# Patient Record
Sex: FEMALE | Race: WHITE | Employment: OTHER | URBAN - METROPOLITAN AREA
[De-identification: names, ages, dates, MRNs, and addresses within clinical notes are randomized per-mention and may not be internally consistent; named-entity substitution may affect disease eponyms.]

---

## 2017-01-16 ENCOUNTER — OFFICE VISIT (OUTPATIENT)
Dept: CARDIOLOGY CLINIC | Age: 72
End: 2017-01-16

## 2017-01-16 VITALS
BODY MASS INDEX: 39.7 KG/M2 | SYSTOLIC BLOOD PRESSURE: 126 MMHG | OXYGEN SATURATION: 98 % | HEIGHT: 66 IN | RESPIRATION RATE: 22 BRPM | WEIGHT: 247 LBS | HEART RATE: 90 BPM | DIASTOLIC BLOOD PRESSURE: 76 MMHG

## 2017-01-16 DIAGNOSIS — I10 ESSENTIAL HYPERTENSION: ICD-10-CM

## 2017-01-16 DIAGNOSIS — I25.10 CORONARY ARTERY DISEASE INVOLVING NATIVE CORONARY ARTERY OF NATIVE HEART WITHOUT ANGINA PECTORIS: Primary | ICD-10-CM

## 2017-01-16 NOTE — PROGRESS NOTES
Bib Delong MD    [id number] [place], [address] [facility]    Office [phone],CDN [phone]  Pager [phone]    Esa Felton is a 73 y.o. female is here for f/u visit  CAD    Primary care physician:  Sheldon Santillan MD    Patient Active Problem List   Diagnosis Code    Type II diabetes mellitus ([facility] [address] E11.9    NSTEMI (non-ST elevated myocardial infarction) ([facility] [address] I21.4    Coronary artery disease involving native coronary artery without angina pectoris I25.10    Essential hypertension I10       Chief complaint:  Chief Complaint   Patient presents with    Coronary Artery Disease       Assessment:  CAD s/p PCI to RCA with BMS ( NSTEMI 10/2015)  HTN - low  HLD - intolerant to multiple statins sec to myalgia  DM - \" BS up and down\"  History of right DVT-October 2016  Chronic anticoagulation        Plan:   Continue cardiac meds  Patient states that she is being evaluated for gastric sleeve surgery. Will need stress test prior to surgery. Not on statin ( allergic/intolerant) -per patient her cholesterol is doing well. Is scheduled to get it checked in 2 weeks. Will need to consider PCKS9 inhibitors if cholesterol is elevated. Aggressive cardiovascular risk factor modification. Follow-up in 6 months. Patient understands the plan. All questions were answered to the patient's satisfaction. Lab Results   Component Value Date/Time    Cholesterol, total 174 10/10/2015 05:00 AM    HDL Cholesterol 52 10/10/2015 05:00 AM    LDL, calculated 68.8 10/10/2015 05:00 AM    VLDL, calculated 53.2 10/10/2015 05:00 AM    Triglyceride 266 10/10/2015 05:00 AM    CHOL/HDL Ratio 3.3 10/10/2015 05:00 AM         Medication Side Effects and Warnings were discussed with patient: yes  Patient Labs were reviewed and or requested:  yes  Patient Past Records were reviewed and or requested: yes    I appreciate the opportunity to be involved in Ms. Sellers. See note below for details. Please do not hesitate to contact us with questions or concerns. Shawnee Abad MD    Cardiac Testing/ Procedures: A. Cardiac Cath/PCI: 10/9/15 L Main: Medl;Nml    LAD: Prox- Med; 50%; Distal - small; D1 - Small to med - prox 60%    LCflex: Large; Tortuous; MLI; GUILLERMO - med - MLI    RCA: Med; Prox 80%; Distal 95% just before bifurcation into small PDA and PLB    LVEDP: 22    LVEF: 55%/ Mild basal inf hypokinesis    No significant gradient across aortic valve. PCI: JR4 guide/BMW  Pre dil with 2 by 12 balloon  BMS 2.25 by 22 deployed at high [place] distally  BMS 2.5 by 18 deployed at high [place] proximal lesion  Post dil with 2.5 by 15      B. ECHO/MEE: 10/11/15 - Left ventricle: Systolic function was vigorous. Ejection fraction was  estimated in the range of 65 % to 70 %. There were no regional wall motion  abnormalities. C.StressNuclear/Stress ECHO/Stress test:    D.Vascular:    E. EP:    F. Miscellaneous:    Subjective:  Elaina Saenz is a 73 y.o. female who returns for follow up  Visit. No CP/dyspnea. Patient states that she developed a DVT and ulcers on her feet (October 2016)- for which is being treated in the wound clinic. Wearing compression stockings. No chest pain, dyspnea. However patient is not very active and uses a cane to walk. ROS:cc  (bold if positive, if negative)     edema         Medications before admission:    Current Outpatient Prescriptions   Medication Sig Dispense    rivaroxaban (XARELTO) 20 mg tab tablet Take  by mouth daily.  lisinopril (PRINIVIL, ZESTRIL) 5 mg tablet TAKE 1 TABLET BY MOUTH DAILY 30 Tab    carvedilol (COREG) 12.5 mg tablet TAKE 1 TABLET BY MOUTH TWICE DAILY WITH MEALS 60 Tab    biotin 10,000 mcg cap Take  by mouth daily.  bumetanide (BUMEX) 1 mg tablet Take  by mouth as needed.  saxagliptin (ONGLYZA) 5 mg tab tablet Take 5 mg by mouth daily.  omeprazole (PRILOSEC) 20 mg capsule Take 20 mg by mouth daily.      DULoxetine (CYMBALTA) 60 mg capsule Take 60 mg by mouth daily.  cholecalciferol (VITAMIN D3) 1,000 unit tablet Take 1,000 Units by mouth daily.  azelastine-fluticasone (DYMISTA) 137-50 mcg/spray spry 137 mcg by Nasal route two (2) times a day.  mometasone-formoterol (DULERA) 200-5 mcg/actuation HFA inhaler Take 2 Puffs by inhalation two (2) times a day.  glipiZIDE SR (GLUCOTROL) 5 mg CR tablet Take 5 mg by mouth two (2) times a day.  predniSONE (DELTASONE) 10 mg tablet Take 10 mg by mouth daily (with dinner).  B.infantis-B.ani-B.long-B.bifi (PROBIOTIC 4X) 10-15 mg TbEC Take 1 Tab by mouth daily. No current facility-administered medications for this visit. Physical Exam:  Visit Vitals    /76 (BP 1 Location: Left arm, BP Patient Position: Sitting)    Pulse 90    Resp 22    Ht 5' 6\" (1.676 m)    Wt 247 lb (112 kg)    SpO2 98%    BMI 39.87 kg/m2          Gen: Well-developed, well-nourished, in no acute distress  Neck: Supple,No JVD, No Carotid Bruit,   Resp: No accessory muscle use, no rales/rhonchi  Card: Regular Rate,Rythm,Normal S1, S2, No murmurs, rubs or gallop. No thrills.    Abd:  Soft, non-tender, non-distended,BS+,   MSK: No cyanosis  Skin: No rashes    Neuro: moving all four extremities , follows commands appropriately  Psych:  Good insight, oriented to person, place , alert, Nml Affect  LE: Wearing compression stockings    EKG:       LABS:        Lab Results   Component Value Date/Time    WBC 12.2 10/10/2015 05:00 AM    HGB 11.5 10/10/2015 05:00 AM    HCT 37.6 10/10/2015 05:00 AM    PLATELET 197 62/12/7283 05:00 AM    MCV 86.0 10/10/2015 05:00 AM     Lab Results   Component Value Date/Time    Sodium 137 10/10/2015 05:00 AM    Potassium 3.6 10/10/2015 05:00 AM    Chloride 104 10/10/2015 05:00 AM    CO2 27 10/10/2015 05:00 AM    Anion gap 6 10/10/2015 05:00 AM    Glucose 120 10/10/2015 05:00 AM    BUN 13 10/10/2015 05:00 AM    Creatinine 1.09 10/10/2015 05:00 AM BUN/Creatinine ratio 12 10/10/2015 05:00 AM    GFR est AA >60 10/10/2015 05:00 AM    GFR est non-AA 50 10/10/2015 05:00 AM    Calcium 8.2 10/10/2015 05:00 AM       No results found for: APTT  No results found for: INR, PTMR, PTP, PT1, PT2  No components found for: Valorie Newton MD

## 2017-02-10 DIAGNOSIS — I10 ESSENTIAL HYPERTENSION WITH GOAL BLOOD PRESSURE LESS THAN 130/85: ICD-10-CM

## 2017-02-10 RX ORDER — LISINOPRIL 5 MG/1
TABLET ORAL
Qty: 90 TAB | Refills: 3 | Status: SHIPPED | OUTPATIENT
Start: 2017-02-10 | End: 2018-03-16 | Stop reason: SDUPTHER

## 2017-04-16 ENCOUNTER — APPOINTMENT (OUTPATIENT)
Dept: CT IMAGING | Age: 72
End: 2017-04-16
Attending: EMERGENCY MEDICINE
Payer: MEDICARE

## 2017-04-16 ENCOUNTER — HOSPITAL ENCOUNTER (EMERGENCY)
Age: 72
Discharge: HOME | End: 2017-04-16
Attending: EMERGENCY MEDICINE
Payer: MEDICARE

## 2017-04-16 ENCOUNTER — HOSPITAL ENCOUNTER (EMERGENCY)
Age: 72
Discharge: HOME | End: 2017-04-16
Attending: EMERGENCY MEDICINE | Admitting: EMERGENCY MEDICINE
Payer: MEDICARE

## 2017-04-16 VITALS
HEIGHT: 67 IN | HEART RATE: 94 BPM | SYSTOLIC BLOOD PRESSURE: 159 MMHG | WEIGHT: 155 LBS | BODY MASS INDEX: 24.33 KG/M2 | TEMPERATURE: 97.5 F | DIASTOLIC BLOOD PRESSURE: 83 MMHG | OXYGEN SATURATION: 94 % | RESPIRATION RATE: 15 BRPM

## 2017-04-16 VITALS
RESPIRATION RATE: 14 BRPM | HEART RATE: 99 BPM | SYSTOLIC BLOOD PRESSURE: 180 MMHG | OXYGEN SATURATION: 95 % | WEIGHT: 155 LBS | HEIGHT: 67 IN | DIASTOLIC BLOOD PRESSURE: 91 MMHG | BODY MASS INDEX: 24.33 KG/M2 | TEMPERATURE: 98.5 F

## 2017-04-16 DIAGNOSIS — K52.9 GASTROENTERITIS, ACUTE: Primary | ICD-10-CM

## 2017-04-16 LAB
ALBUMIN SERPL BCP-MCNC: 3.2 G/DL (ref 3.5–5)
ALBUMIN/GLOB SERPL: 0.8 {RATIO} (ref 1.1–2.2)
ALP SERPL-CCNC: 122 U/L (ref 45–117)
ALT SERPL-CCNC: 26 U/L (ref 12–78)
ANION GAP BLD CALC-SCNC: 13 MMOL/L (ref 5–15)
APPEARANCE UR: CLEAR
AST SERPL W P-5'-P-CCNC: 14 U/L (ref 15–37)
BACTERIA URNS QL MICRO: NEGATIVE /HPF
BASOPHILS # BLD AUTO: 0 K/UL (ref 0–0.1)
BASOPHILS # BLD: 0 % (ref 0–1)
BILIRUB SERPL-MCNC: 0.7 MG/DL (ref 0.2–1)
BILIRUB UR QL: NEGATIVE
BUN SERPL-MCNC: 13 MG/DL (ref 6–20)
BUN/CREAT SERPL: 12 (ref 12–20)
CALCIUM SERPL-MCNC: 8.7 MG/DL (ref 8.5–10.1)
CHLORIDE SERPL-SCNC: 98 MMOL/L (ref 97–108)
CO2 SERPL-SCNC: 24 MMOL/L (ref 21–32)
COLOR UR: ABNORMAL
CREAT SERPL-MCNC: 1.05 MG/DL (ref 0.55–1.02)
DIFFERENTIAL METHOD BLD: ABNORMAL
EOSINOPHIL # BLD: 0 K/UL (ref 0–0.4)
EOSINOPHIL NFR BLD: 0 % (ref 0–7)
EPITH CASTS URNS QL MICRO: ABNORMAL /LPF
ERYTHROCYTE [DISTWIDTH] IN BLOOD BY AUTOMATED COUNT: 16.4 % (ref 11.5–14.5)
GLOBULIN SER CALC-MCNC: 4.1 G/DL (ref 2–4)
GLUCOSE SERPL-MCNC: 188 MG/DL (ref 65–100)
GLUCOSE UR STRIP.AUTO-MCNC: NEGATIVE MG/DL
HCT VFR BLD AUTO: 40.3 % (ref 35–47)
HGB BLD-MCNC: 13.1 G/DL (ref 11.5–16)
HGB UR QL STRIP: NEGATIVE
HYALINE CASTS URNS QL MICRO: ABNORMAL /LPF (ref 0–5)
KETONES UR QL STRIP.AUTO: 15 MG/DL
LEUKOCYTE ESTERASE UR QL STRIP.AUTO: NEGATIVE
LIPASE SERPL-CCNC: 189 U/L (ref 73–393)
LYMPHOCYTES # BLD AUTO: 5 % (ref 12–49)
LYMPHOCYTES # BLD: 0.9 K/UL (ref 0.8–3.5)
MCH RBC QN AUTO: 25.8 PG (ref 26–34)
MCHC RBC AUTO-ENTMCNC: 32.5 G/DL (ref 30–36.5)
MCV RBC AUTO: 79.3 FL (ref 80–99)
MONOCYTES # BLD: 0.9 K/UL (ref 0–1)
MONOCYTES NFR BLD AUTO: 5 % (ref 5–13)
NEUTS SEG # BLD: 16.9 K/UL (ref 1.8–8)
NEUTS SEG NFR BLD AUTO: 90 % (ref 32–75)
NITRITE UR QL STRIP.AUTO: NEGATIVE
PH UR STRIP: 5.5 [PH] (ref 5–8)
PLATELET # BLD AUTO: 322 K/UL (ref 150–400)
POTASSIUM SERPL-SCNC: 4.2 MMOL/L (ref 3.5–5.1)
PROT SERPL-MCNC: 7.3 G/DL (ref 6.4–8.2)
PROT UR STRIP-MCNC: ABNORMAL MG/DL
RBC # BLD AUTO: 5.08 M/UL (ref 3.8–5.2)
RBC #/AREA URNS HPF: ABNORMAL /HPF (ref 0–5)
RBC MORPH BLD: ABNORMAL
RBC MORPH BLD: ABNORMAL
SODIUM SERPL-SCNC: 135 MMOL/L (ref 136–145)
SP GR UR REFRACTOMETRY: 1.02 (ref 1–1.03)
UROBILINOGEN UR QL STRIP.AUTO: 0.2 EU/DL (ref 0.2–1)
WBC # BLD AUTO: 18.7 K/UL (ref 3.6–11)
WBC URNS QL MICRO: ABNORMAL /HPF (ref 0–4)

## 2017-04-16 PROCEDURE — 99285 EMERGENCY DEPT VISIT HI MDM: CPT

## 2017-04-16 PROCEDURE — 74011636320 HC RX REV CODE- 636/320: Performed by: EMERGENCY MEDICINE

## 2017-04-16 PROCEDURE — 51701 INSERT BLADDER CATHETER: CPT

## 2017-04-16 PROCEDURE — 96361 HYDRATE IV INFUSION ADD-ON: CPT

## 2017-04-16 PROCEDURE — 96374 THER/PROPH/DIAG INJ IV PUSH: CPT

## 2017-04-16 PROCEDURE — 80053 COMPREHEN METABOLIC PANEL: CPT | Performed by: EMERGENCY MEDICINE

## 2017-04-16 PROCEDURE — 85025 COMPLETE CBC W/AUTO DIFF WBC: CPT | Performed by: EMERGENCY MEDICINE

## 2017-04-16 PROCEDURE — 77030011943

## 2017-04-16 PROCEDURE — 96375 TX/PRO/DX INJ NEW DRUG ADDON: CPT

## 2017-04-16 PROCEDURE — 83690 ASSAY OF LIPASE: CPT | Performed by: EMERGENCY MEDICINE

## 2017-04-16 PROCEDURE — 74177 CT ABD & PELVIS W/CONTRAST: CPT

## 2017-04-16 PROCEDURE — 36415 COLL VENOUS BLD VENIPUNCTURE: CPT | Performed by: EMERGENCY MEDICINE

## 2017-04-16 PROCEDURE — 74011250636 HC RX REV CODE- 250/636: Performed by: EMERGENCY MEDICINE

## 2017-04-16 PROCEDURE — 81001 URINALYSIS AUTO W/SCOPE: CPT | Performed by: EMERGENCY MEDICINE

## 2017-04-16 RX ORDER — PROMETHAZINE HYDROCHLORIDE 25 MG/1
25 TABLET ORAL
Qty: 12 TAB | Refills: 0 | Status: SHIPPED | OUTPATIENT
Start: 2017-04-16 | End: 2018-03-30 | Stop reason: ALTCHOICE

## 2017-04-16 RX ORDER — FAMOTIDINE 10 MG/ML
20 INJECTION INTRAVENOUS
Status: COMPLETED | OUTPATIENT
Start: 2017-04-16 | End: 2017-04-16

## 2017-04-16 RX ORDER — ONDANSETRON 2 MG/ML
4 INJECTION INTRAMUSCULAR; INTRAVENOUS
Status: COMPLETED | OUTPATIENT
Start: 2017-04-16 | End: 2017-04-16

## 2017-04-16 RX ORDER — DIPHENHYDRAMINE HYDROCHLORIDE 50 MG/ML
25 INJECTION, SOLUTION INTRAMUSCULAR; INTRAVENOUS
Status: COMPLETED | OUTPATIENT
Start: 2017-04-16 | End: 2017-04-16

## 2017-04-16 RX ORDER — HYDROMORPHONE HYDROCHLORIDE 1 MG/ML
1 INJECTION, SOLUTION INTRAMUSCULAR; INTRAVENOUS; SUBCUTANEOUS
Status: DISCONTINUED | OUTPATIENT
Start: 2017-04-16 | End: 2017-04-16 | Stop reason: HOSPADM

## 2017-04-16 RX ORDER — ONDANSETRON 4 MG/1
4 TABLET, ORALLY DISINTEGRATING ORAL
Qty: 20 TAB | Refills: 0 | Status: SHIPPED | OUTPATIENT
Start: 2017-04-16 | End: 2017-07-18

## 2017-04-16 RX ORDER — DICYCLOMINE HYDROCHLORIDE 20 MG/1
20 TABLET ORAL EVERY 6 HOURS
Qty: 20 TAB | Refills: 0 | Status: SHIPPED | OUTPATIENT
Start: 2017-04-16 | End: 2017-04-21

## 2017-04-16 RX ORDER — SODIUM CHLORIDE 0.9 % (FLUSH) 0.9 %
5-10 SYRINGE (ML) INJECTION EVERY 8 HOURS
Status: DISCONTINUED | OUTPATIENT
Start: 2017-04-16 | End: 2017-04-16 | Stop reason: HOSPADM

## 2017-04-16 RX ORDER — PROCHLORPERAZINE EDISYLATE 5 MG/ML
10 INJECTION INTRAMUSCULAR; INTRAVENOUS
Status: COMPLETED | OUTPATIENT
Start: 2017-04-16 | End: 2017-04-16

## 2017-04-16 RX ORDER — HYDROCODONE BITARTRATE AND ACETAMINOPHEN 5; 325 MG/1; MG/1
1 TABLET ORAL
Qty: 12 TAB | Refills: 0 | Status: SHIPPED | OUTPATIENT
Start: 2017-04-16 | End: 2018-03-30 | Stop reason: ALTCHOICE

## 2017-04-16 RX ORDER — HYDROCODONE BITARTRATE AND ACETAMINOPHEN 7.5; 325 MG/1; MG/1
1 TABLET ORAL
Status: COMPLETED | OUTPATIENT
Start: 2017-04-16 | End: 2017-04-16

## 2017-04-16 RX ORDER — SODIUM CHLORIDE 0.9 % (FLUSH) 0.9 %
5-10 SYRINGE (ML) INJECTION AS NEEDED
Status: DISCONTINUED | OUTPATIENT
Start: 2017-04-16 | End: 2017-04-16 | Stop reason: HOSPADM

## 2017-04-16 RX ORDER — DIPHENOXYLATE HYDROCHLORIDE AND ATROPINE SULFATE 2.5; .025 MG/1; MG/1
TABLET ORAL
COMMUNITY
End: 2017-04-16

## 2017-04-16 RX ADMIN — SODIUM CHLORIDE 1000 ML: 900 INJECTION, SOLUTION INTRAVENOUS at 20:04

## 2017-04-16 RX ADMIN — DIPHENHYDRAMINE HYDROCHLORIDE 25 MG: 50 INJECTION, SOLUTION INTRAMUSCULAR; INTRAVENOUS at 20:04

## 2017-04-16 RX ADMIN — SODIUM CHLORIDE 1000 ML: 900 INJECTION, SOLUTION INTRAVENOUS at 11:59

## 2017-04-16 RX ADMIN — FAMOTIDINE 20 MG: 10 INJECTION, SOLUTION INTRAVENOUS at 20:04

## 2017-04-16 RX ADMIN — PROCHLORPERAZINE EDISYLATE 10 MG: 5 INJECTION INTRAMUSCULAR; INTRAVENOUS at 20:04

## 2017-04-16 RX ADMIN — IOPAMIDOL 96 ML: 755 INJECTION, SOLUTION INTRAVENOUS at 12:55

## 2017-04-16 RX ADMIN — HYDROCODONE BITARTRATE AND ACETAMINOPHEN 1 TABLET: 7.5; 325 TABLET ORAL at 21:20

## 2017-04-16 RX ADMIN — ONDANSETRON 4 MG: 2 INJECTION INTRAMUSCULAR; INTRAVENOUS at 11:54

## 2017-04-16 RX ADMIN — HYDROMORPHONE HYDROCHLORIDE 1 MG: 1 INJECTION, SOLUTION INTRAMUSCULAR; INTRAVENOUS; SUBCUTANEOUS at 11:56

## 2017-04-16 NOTE — ED PROVIDER NOTES
HPI Comments: 73 y.o. female with past medical history significant for DVT, HTN, CAD, NSTEMI, sleep apnea with CPAP, DM, fibromyalgia, asthma, and GERD who presents from home with chief complaint of abdominal pain. Pt reports to the ED c/o diffuse abdominal pain that has been constant for the past 3 days. Pt describes the pain as constant and not fluctuating, and she denies having experienced it in the past. Associated symptoms include diarrhea and nausea. Pertinent negatives include fever, vomiting, bloody stool, and urinary symptoms. Pt notes that she was started on Macrobid d/t a UTI, but she was taken off the medication after taking it for 3 days because of her new symptoms. Pt denies any recent travel. There are no other acute medical concerns at this time. PSHx: Colostomy, reverse colostomy, hysterectomy, , cholecystectomy, herniorrhaphy. PCP: Bhupinder Clement MD    Note written by Miki Carr, as dictated by Rj Hale MD 11:42 AM      The history is provided by the patient and a relative.         Past Medical History:   Diagnosis Date    Asthma     Autoimmune disease ([address]     fibromyalgia    CAD (coronary artery disease) 10/9/2015    Diabetes ([facility] [address]     DVT (deep vein thrombosis) in pregnancy ([facility] [address]     GERD (gastroesophageal reflux disease)     HTN (hypertension)     NSTEMI (non-ST elevated myocardial infarction) ([facility] [address] 10/9/2015    Sleep apnea     wears CPAP       Past Surgical History:   Procedure Laterality Date    ABDOMEN SURGERY PROC UNLISTED      hital hernia repair, gall bladder removed    BREAST SURGERY PROCEDURE UNLISTED      lumpectomy    CARDIAC SURG PROCEDURE UNLIST  10/9/15    2 stents    HX  SECTION      HX COLOSTOMY      and reversal, post episiotomy repair    HX HYSTERECTOMY  1970    HX UROLOGICAL  2009    bladder sling         Family History:   Problem Relation Age of Onset    Diabetes Mother     Heart Failure Mother     Kidney Disease Mother     Diabetes Father     Heart Disease Father     Elevated Lipids Father     Heart Failure Father     Heart Disease Brother     Cancer Brother      kidney    Diabetes Brother     No Known Problems Brother     No Known Problems Brother        Social History     Social History    Marital status:      Spouse name: N/A    Number of children: N/A    Years of education: N/A     Occupational History    Not on file. Social History Main Topics    Smoking status: Current Every Day Smoker    Smokeless tobacco: Not on file    Alcohol use 0.0 oz/week     0 Standard drinks or equivalent per week      Comment: very very rarely    Drug use: Not on file    Sexual activity: Not on file     Other Topics Concern    Not on file     Social History Narrative         ALLERGIES: Advair diskus [fluticasone-salmeterol]; Ciprofloxacin; Statins-hmg-coa reductase inhibitors; Sulfa (sulfonamide antibiotics); and Tetracycline    Review of Systems   Constitutional: Negative. Negative for appetite change, fever and unexpected weight change. HENT: Negative. Negative for ear pain, hearing loss, nosebleeds, rhinorrhea, sore throat and trouble swallowing. Respiratory: Negative. Negative for cough, chest tightness and shortness of breath. Cardiovascular: Negative. Negative for chest pain and palpitations. Gastrointestinal: Positive for abdominal pain, diarrhea and nausea. Negative for abdominal distention, blood in stool and vomiting. Endocrine: Negative. Genitourinary: Negative for decreased urine volume, difficulty urinating, dysuria, enuresis, frequency, hematuria and urgency. Musculoskeletal: Negative. Negative for back pain and myalgias. Skin: Negative. Negative for rash. Allergic/Immunologic: Negative. Neurological: Negative. Negative for dizziness, syncope, weakness and numbness. Hematological: Negative. Psychiatric/Behavioral: Negative.     All other systems reviewed and are negative. Vitals:    04/16/17 1154 04/16/17 1158 04/16/17 1200 04/16/17 1202   BP: (!) 161/92  155/82    Pulse:       Resp:       Temp:       SpO2: 94% 93%  91%   Weight:       Height:                Physical Exam   Constitutional: She is oriented to person, place, and time. She appears well-developed and well-nourished. No distress. HENT:   Head: Normocephalic and atraumatic. Right Ear: External ear normal.   Left Ear: External ear normal.   Nose: Nose normal.   Mouth/Throat: Oropharynx is clear and moist.   Eyes: Conjunctivae and EOM are normal. Pupils are equal, round, and reactive to light. Neck: Normal range of motion. Neck supple. No JVD present. No thyromegaly present. Cardiovascular: Normal rate, regular rhythm, normal heart sounds and intact distal pulses. No murmur heard. Pulmonary/Chest: Effort normal and breath sounds normal. No respiratory distress. She has no wheezes. She has no rales. Abdominal: Soft. Bowel sounds are normal. She exhibits no distension. There is tenderness. There is no rebound and no guarding. Diffuse abdominal discomfort without guarding or rebound. Bowel sounds normal.   Musculoskeletal: Normal range of motion. She exhibits no edema. Neurological: She is alert and oriented to person, place, and time. No cranial nerve deficit. Skin: Skin is warm and dry. No rash noted. Psychiatric: She has a normal mood and affect. Her behavior is normal. Thought content normal.    Note written by Zuleika Bai, as dictated by Danilo Ann MD 11:42 AM    [facility]  ED Course       Procedures     Assessment & Plan  Pt's UA is unremarkable except for the presence of trace ketones; Chemistry is not acutely remarkable aside from glucose of 188; Lipase is normal; WBC is 18.7 but the rest of her CBC is unremarkable; CT abd/pelvis shows no acute process. Assessment is diarrhea, questionable d/t viral gastroenteritis.  Will discharge home with Rx for Bentyl and Zofran as well as instructions to follow-up with PCP in 2-3 days. There is no indication for abx at this time.

## 2017-04-16 NOTE — ED TRIAGE NOTES
Abdominal pain with diarrhea since Thursday morning, went to PCP on Friday and was given Lomotil without relief. Also reports decreased intake.

## 2017-04-16 NOTE — DISCHARGE INSTRUCTIONS
Gastroenteritis: Care Instructions  Your Care Instructions  Gastroenteritis is an illness that may cause nausea, vomiting, and diarrhea. It is sometimes called \"stomach flu. \" It can be caused by bacteria or a virus. You will probably begin to feel better in 1 to 2 days. In the meantime, get plenty of rest and make sure you do not become dehydrated. Dehydration occurs when your body loses too much fluid. Follow-up care is a key part of your treatment and safety. Be sure to make and go to all appointments, and call your doctor if you are having problems. Its also a good idea to know your test results and keep a list of the medicines you take. How can you care for yourself at home? · If your doctor prescribed antibiotics, take them as directed. Do not stop taking them just because you feel better. You need to take the full course of antibiotics. · Drink plenty of fluids to prevent dehydration, enough so that your urine is light yellow or clear like water. Choose water and other caffeine-free clear liquids until you feel better. If you have kidney, heart, or liver disease and have to limit fluids, talk with your doctor before you increase your fluid intake. · Drink fluids slowly, in frequent, small amounts, because drinking too much too fast can cause vomiting. · Begin eating mild foods, such as dry toast, yogurt, applesauce, bananas, and rice. Avoid spicy, hot, or high-fat foods, and do not drink alcohol or caffeine for a day or two. Do not drink milk or eat ice cream until you are feeling better. How to prevent gastroenteritis  · Keep hot foods hot and cold foods cold. · Do not eat meats, dressings, salads, or other foods that have been kept at room temperature for more than 2 hours. · Use a thermometer to check your refrigerator. It should be between 34°F and 40°F.  · Defrost meats in the refrigerator or microwave, not on the kitchen counter. · Keep your hands and your kitchen clean.  Wash your hands, cutting boards, and countertops with hot soapy water frequently. · Cook meat until it is well done. · Do not eat raw eggs or uncooked sauces made with raw eggs. · Do not take chances. If food looks or tastes spoiled, throw it out. When should you call for help? Call 911 anytime you think you may need emergency care. For example, call if:  · You vomit blood or what looks like coffee grounds. · You passed out (lost consciousness). · You pass maroon or very bloody stools. Call your doctor now or seek immediate medical care if:  · You have severe belly pain. · You have signs of needing more fluids. You have sunken eyes, a dry mouth, and pass only a little dark urine. · You feel like you are going to faint. · You have increased belly pain that does not go away in 1 to 2 days. · You have new or increased nausea, or you are vomiting. · You have a new or higher fever. · Your stools are black and tarlike or have streaks of blood. Watch closely for changes in your health, and be sure to contact your doctor if:  · You are dizzy or lightheaded. · You urinate less than usual, or your urine is dark yellow or brown. · You do not feel better with each day that goes by. Where can you learn more? Go to http://eloina-sherman.info/. Enter N142 in the search box to learn more about \"Gastroenteritis: Care Instructions. \"  Current as of: May 24, 2016  Content Version: 11.2  © 3644-3510 [employer]. Care instructions adapted under license by [employer] (which disclaims liability or warranty for this information). If you have questions about a medical condition or this instruction, always ask your healthcare professional. [address] any warranty or liability for your use of this information. We hope that we have addressed all of your medical concerns.  The examination and treatment you received in the Emergency Department were for an emergent problem and were not intended as complete care. It is important that you follow up with your healthcare provider(s) for ongoing care. If your symptoms worsen or do not improve as expected, and you are unable to reach your usual health care provider(s), you should return to the Emergency Department. Today's healthcare is undergoing tremendous change, and patient satisfaction surveys are one of the many tools to assess the quality of medical care. You may receive a survey from the [employer] regarding your experience in the Emergency Department. I hope that your experience has been completely positive, particularly the medical care that I provided. As such, please participate in the survey; anything less than excellent does not meet my expectations or intentions. [facility] and [facility] participate in nationally recognized quality of care measures. If your blood pressure is greater than 120/80, as reported below, we urge that you seek medical care to address the potential of high blood pressure, commonly known as hypertension. Hypertension can be hereditary or can be caused by certain medical conditions, pain, stress, or \"white coat syndrome. \"       Please make an appointment with your health care provider(s) for follow up of your Emergency Department visit. VITALS:   Patient Vitals for the past 8 hrs:   Temp Pulse Resp BP SpO2   04/16/17 1314 - - - - 92 %   04/16/17 1259 - - - - 94 %   04/16/17 1246 - - - - 90 %   04/16/17 1245 - - - 157/85 -   04/16/17 1231 - - - - 91 %   04/16/17 1230 - - - 159/81 -   04/16/17 1228 - - - - 91 %   04/16/17 1202 - - - - 91 %   04/16/17 1200 - - - 155/82 -   04/16/17 1158 - - - - 93 %   04/16/17 1154 - - - (!) 161/92 94 %   04/16/17 1133 97.5 °F (36.4 °C) (!) 108 17 139/67 97 %          Thank you for allowing us to provide you with medical care today.   We realize that you have many choices for your emergency care needs. Please choose us in the future for any continued health care needs. Regards,           Kei GARCÍA Fransisco Valenzuela, [address] [place], [place] [address]y 20.   Office: [phone]            Recent Results (from the past 24 hour(s))   METABOLIC PANEL, COMPREHENSIVE    Collection Time: 04/16/17 12:02 PM   Result Value Ref Range    Sodium 135 (L) 136 - 145 mmol/L    Potassium 4.2 3.5 - 5.1 mmol/L    Chloride 98 97 - 108 mmol/L    CO2 24 21 - 32 mmol/L    Anion gap 13 5 - 15 mmol/L    Glucose 188 (H) 65 - 100 mg/dL    BUN 13 6 - 20 MG/DL    Creatinine 1.05 (H) 0.55 - 1.02 MG/DL    BUN/Creatinine ratio 12 12 - 20      GFR est AA >60 >60 ml/min/1.73m2    GFR est non-AA 52 (L) >60 ml/min/1.73m2    Calcium 8.7 8.5 - 10.1 MG/DL    Bilirubin, total 0.7 0.2 - 1.0 MG/DL    ALT (SGPT) 26 12 - 78 U/L    AST (SGOT) 14 (L) 15 - 37 U/L    Alk. phosphatase 122 (H) 45 - 117 U/L    Protein, total 7.3 6.4 - 8.2 g/dL    Albumin 3.2 (L) 3.5 - 5.0 g/dL    Globulin 4.1 (H) 2.0 - 4.0 g/dL    A-G Ratio 0.8 (L) 1.1 - 2.2     LIPASE    Collection Time: 04/16/17 12:02 PM   Result Value Ref Range    Lipase 189 73 - 393 U/L   CBC WITH AUTOMATED DIFF    Collection Time: 04/16/17 12:02 PM   Result Value Ref Range    WBC 18.7 (H) 3.6 - 11.0 K/uL    RBC 5.08 3.80 - 5.20 M/uL    HGB 13.1 11.5 - 16.0 g/dL    HCT 40.3 35.0 - 47.0 %    MCV 79.3 (L) 80.0 - 99.0 FL    MCH 25.8 (L) 26.0 - 34.0 PG    MCHC 32.5 30.0 - 36.5 g/dL    RDW 16.4 (H) 11.5 - 14.5 %    PLATELET 800 500 - 501 K/uL    NEUTROPHILS 90 (H) 32 - 75 %    LYMPHOCYTES 5 (L) 12 - 49 %    MONOCYTES 5 5 - 13 %    EOSINOPHILS 0 0 - 7 %    BASOPHILS 0 0 - 1 %    ABS. NEUTROPHILS 16.9 (H) 1.8 - 8.0 K/UL    ABS. LYMPHOCYTES 0.9 0.8 - 3.5 K/UL    ABS. MONOCYTES 0.9 0.0 - 1.0 K/UL    ABS. EOSINOPHILS 0.0 0.0 - 0.4 K/UL    ABS.  BASOPHILS 0.0 0.0 - 0.1 K/UL    DF SMEAR SCANNED      RBC COMMENTS OVALOCYTES  PRESENT        RBC COMMENTS ANISOCYTOSIS  1+       URINALYSIS W/MICROSCOPIC    Collection Time: 04/16/17  1:13 PM   Result Value Ref Range    Color YELLOW/STRAW      Appearance CLEAR CLEAR      Specific gravity 1.020 1.003 - 1.030      pH (UA) 5.5 5.0 - 8.0      Protein TRACE (A) NEG mg/dL    Glucose NEGATIVE  NEG mg/dL    Ketone 15 (A) NEG mg/dL    Bilirubin NEGATIVE  NEG      Blood NEGATIVE  NEG      Urobilinogen 0.2 0.2 - 1.0 EU/dL    Nitrites NEGATIVE  NEG      Leukocyte Esterase NEGATIVE  NEG      WBC 0-4 0 - 4 /hpf    RBC 0-5 0 - 5 /hpf    Epithelial cells FEW FEW /lpf    Bacteria NEGATIVE  NEG /hpf    Hyaline cast 2-5 0 - 5 /lpf       Ct Abd Pelv W Cont    Result Date: 4/16/2017  INDICATION: Abdominal pain and diarrhea for 3 days. COMPARISON: None TECHNIQUE: Following the uneventful intravenous administration of 100 cc Isovue-370, thin axial images were obtained through the abdomen and pelvis. Coronal and sagittal reconstructions were generated. Oral contrast was not administered. CT dose reduction was achieved through use of a standardized protocol tailored for this examination and automatic exposure control for dose modulation. FINDINGS: LUNG BASES: Clear. INCIDENTALLY IMAGED HEART AND MEDIASTINUM: Coronary artery calcifications are present. LIVER: No mass or biliary dilatation. GALLBLADDER: Surgically absent. SPLEEN: No mass. PANCREAS: No mass or ductal dilatation. ADRENALS: Unremarkable. KIDNEYS: No mass, calculus, or hydronephrosis. STOMACH: Small hiatal hernia. SMALL BOWEL: No dilatation or wall thickening. COLON: No dilatation or wall thickening. APPENDIX: Normal. PERITONEUM: No ascites or pneumoperitoneum. Small fat-containing umbilical hernia. RETROPERITONEUM: No lymphadenopathy or aortic aneurysm. REPRODUCTIVE ORGANS: Status post hysterectomy. URINARY BLADDER: No mass or calculus. BONES: No destructive bone lesion. ADDITIONAL COMMENTS: N/A     IMPRESSION: No evidence of acute abdominal or pelvic process. Small hiatal hernia. Small fat-containing umbilical hernia.

## 2017-04-16 NOTE — ED PROVIDER NOTES
HPI Comments: 73 y.o. female with past medical history significant for DVT, HTN, CAD, NSTEMI, sleep apnea with CPAP, DM, fibromyalgia, asthma, and GERD who presents from home with chief complaint of abdominal pain. Pt reports to the ED c/o diffuse abdominal pain that has been constant for the past 3 days. Pt describes the pain as constant and not fluctuating, and she denies having experienced it in the past. Associated symptoms include diarrhea and nausea. Pertinent negatives include fever, vomiting, bloody stool, and urinary symptoms. Pt notes that she was started on Macrobid d/t a UTI, but she was taken off the medication after taking it for 3 days because of her new symptoms. Seen in the ED this am for the same. Routine laboratory data, UA, and ct abd/pelvis unremarkable. Discharged home on bentyl and zofran. Pt states that symptoms haven't improved. Pt denies fevers, chills, night sweats, chest pain, pressure, SOB, melena, hematuria, dysuria, constipation, HA, dizziness, and syncope. Past Medical History:  No date: Asthma  No date: Autoimmune disease ([facility]      Comment: fibromyalgia  10/9/2015: CAD (coronary artery disease)  No date: Diabetes (HCC)  No date: DVT (deep vein thrombosis) in pregnancy ([facility]  No date: GERD (gastroesophageal reflux disease)  No date: HTN (hypertension)  10/9/2015: NSTEMI (non-ST elevated myocardial infarction)*  No date: Sleep apnea      Comment: wears CPAP    Past Surgical History:  No date: ABDOMEN SURGERY PROC UNLISTED      Comment: hital hernia repair, gall bladder removed  No date: BREAST SURGERY PROCEDURE UNLISTED      Comment: lumpectomy  10/9/15: CARDIAC SURG PROCEDURE UNLIST      Comment: 2 stents  1971: HX  SECTION  No date: HX COLOSTOMY      Comment: and reversal, post episiotomy repair  1970: HX HYSTERECTOMY  : HX UROLOGICAL      Comment: bladder sling    PCP:  Tere Sterling MD        Patient is a 73 y.o. female presenting with abdominal pain.    Abdominal Pain    Associated symptoms include diarrhea, nausea and vomiting. Pertinent negatives include no fever, no constipation, no dysuria, no frequency, no hematuria, no headaches, no arthralgias, no myalgias, no chest pain and no back pain. Past Medical History:   Diagnosis Date    Asthma     Autoimmune disease ([facility]     fibromyalgia    CAD (coronary artery disease) 10/9/2015    Diabetes ([facility]     DVT (deep vein thrombosis) in pregnancy ([facility]     GERD (gastroesophageal reflux disease)     HTN (hypertension)     NSTEMI (non-ST elevated myocardial infarction) ([facility] 10/9/2015    Sleep apnea     wears CPAP       Past Surgical History:   Procedure Laterality Date    ABDOMEN SURGERY PROC UNLISTED      hital hernia repair, gall bladder removed    BREAST SURGERY PROCEDURE UNLISTED      lumpectomy    CARDIAC SURG PROCEDURE UNLIST  10/9/15    2 stents    HX  SECTION  1971    HX COLOSTOMY      and reversal, post episiotomy repair    HX HYSTERECTOMY  1970    HX UROLOGICAL  2009    bladder sling         Family History:   Problem Relation Age of Onset    Diabetes Mother     Heart Failure Mother     Kidney Disease Mother     Diabetes Father     Heart Disease Father     Elevated Lipids Father     Heart Failure Father     Heart Disease Brother     Cancer Brother      kidney    Diabetes Brother     No Known Problems Brother     No Known Problems Brother        Social History     Social History    Marital status:      Spouse name: N/A    Number of children: N/A    Years of education: N/A     Occupational History    Not on file.      Social History Main Topics    Smoking status: Current Every Day Smoker    Smokeless tobacco: Not on file    Alcohol use 0.0 oz/week     0 Standard drinks or equivalent per week      Comment: very very rarely    Drug use: Not on file    Sexual activity: Not on file     Other Topics Concern    Not on file     Social History Narrative         ALLERGIES: Advair diskus [fluticasone-salmeterol]; Ciprofloxacin; Statins-hmg-coa reductase inhibitors; Sulfa (sulfonamide antibiotics); and Tetracycline    Review of Systems   Constitutional: Negative for activity change, appetite change, chills, diaphoresis, fatigue, fever and unexpected weight change. HENT: Negative for congestion, ear pain, rhinorrhea, sinus pressure, sore throat and tinnitus. Eyes: Negative for photophobia, pain, discharge and visual disturbance. Respiratory: Negative for apnea, cough, choking, chest tightness, shortness of breath, wheezing and stridor. Cardiovascular: Negative for chest pain, palpitations and leg swelling. Gastrointestinal: Positive for abdominal pain, diarrhea, nausea and vomiting. Negative for constipation. Endocrine: Negative for polydipsia, polyphagia and polyuria. Genitourinary: Negative for decreased urine volume, dyspareunia, dysuria, enuresis, flank pain, frequency, hematuria and urgency. Musculoskeletal: Negative for arthralgias, back pain, gait problem, myalgias and neck pain. Skin: Negative for color change, pallor, rash and wound. Allergic/Immunologic: Negative for immunocompromised state. Neurological: Negative for dizziness, seizures, syncope, weakness, light-headedness and headaches. Hematological: Does not bruise/bleed easily. Psychiatric/Behavioral: Negative for agitation and confusion. The patient is not nervous/anxious. Vitals:    04/16/17 1924   BP: (!) 184/97   Pulse: (!) 105   Resp: 18   Temp: 97.3 °F (36.3 °C)   SpO2: 93%   Weight: 70.3 kg (155 lb)   Height: 5' 7\" (1.702 m)            Physical Exam   Constitutional: She is oriented to person, place, and time. She appears well-developed and well-nourished. No distress. HENT:   Head: Normocephalic. Right Ear: External ear normal.   Left Ear: External ear normal.   Mouth/Throat: Oropharynx is clear and moist. No oropharyngeal exudate.    Eyes: Conjunctivae and EOM are normal. Pupils are equal, round, and reactive to light. Right eye exhibits no discharge. Left eye exhibits no discharge. No scleral icterus. Neck: Normal range of motion. Neck supple. No JVD present. No tracheal deviation present. No thyromegaly present. Cardiovascular: Normal rate, regular rhythm, normal heart sounds and intact distal pulses. Exam reveals no gallop and no friction rub. No murmur heard. Pulmonary/Chest: Effort normal and breath sounds normal. No stridor. No respiratory distress. She has no wheezes. She has no rales. She exhibits no tenderness. Abdominal: Soft. Bowel sounds are normal. She exhibits no distension and no mass. There is no hepatosplenomegaly. There is generalized tenderness. There is no rigidity, no rebound, no guarding, no CVA tenderness, no tenderness at McBurney's point and negative Russ's sign. Musculoskeletal: Normal range of motion. She exhibits no edema or tenderness. Lymphadenopathy:     She has no cervical adenopathy. Neurological: She is alert and oriented to person, place, and time. She displays normal reflexes. No cranial nerve deficit. Coordination normal. GCS eye subscore is 4. GCS verbal subscore is 5. GCS motor subscore is 6. Skin: Skin is warm and dry. No rash noted. She is not diaphoretic. No erythema. No pallor. Psychiatric: She has a normal mood and affect. Her behavior is normal. Judgment and thought content normal.   Nursing note and vitals reviewed.        MDM  Number of Diagnoses or Management Options  Gastroenteritis, acute:   Diagnosis management comments:    * IVF, compazine, and benadryl   * po challenge   * analgesia    * pepcid   * EKG       Amount and/or Complexity of Data Reviewed  Clinical lab tests: reviewed  Tests in the radiology section of CPT®: reviewed  Review and summarize past medical records: yes  Discuss the patient with other providers: yes    Risk of Complications, Morbidity, and/or Mortality  General comments:    - stable, ambulatory pt in NAD    Patient Progress  Patient progress: stable    ED Course       Procedures  ED EKG interpretation:  Rhythm: normal sinus rhythm; and regular . Rate (approx.): 86. Left anterior fascicular block. Note written by Sandra Montgomery, as dictated by Lorri Hull DO 7:58 PM         9:51 PM  Pt has been reevaluated. There are no new complaints, changes, or physical findings at this time. Medications have been reviewed w/ pt and/or family. Pt and/or family's questions have been answered. Pt and/or family expressed good understanding of the dx/tx/rx and is in agreement with plan of care. Pt instructed and agreed to f/u w/ PCP and to return to ED upon further deterioration. Pt is ready for discharge. LABORATORY TESTS:  Recent Results (from the past 12 hour(s))   METABOLIC PANEL, COMPREHENSIVE    Collection Time: 04/16/17 12:02 PM   Result Value Ref Range    Sodium 135 (L) 136 - 145 mmol/L    Potassium 4.2 3.5 - 5.1 mmol/L    Chloride 98 97 - 108 mmol/L    CO2 24 21 - 32 mmol/L    Anion gap 13 5 - 15 mmol/L    Glucose 188 (H) 65 - 100 mg/dL    BUN 13 6 - 20 MG/DL    Creatinine 1.05 (H) 0.55 - 1.02 MG/DL    BUN/Creatinine ratio 12 12 - 20      GFR est AA >60 >60 ml/min/1.73m2    GFR est non-AA 52 (L) >60 ml/min/1.73m2    Calcium 8.7 8.5 - 10.1 MG/DL    Bilirubin, total 0.7 0.2 - 1.0 MG/DL    ALT (SGPT) 26 12 - 78 U/L    AST (SGOT) 14 (L) 15 - 37 U/L    Alk.  phosphatase 122 (H) 45 - 117 U/L    Protein, total 7.3 6.4 - 8.2 g/dL    Albumin 3.2 (L) 3.5 - 5.0 g/dL    Globulin 4.1 (H) 2.0 - 4.0 g/dL    A-G Ratio 0.8 (L) 1.1 - 2.2     LIPASE    Collection Time: 04/16/17 12:02 PM   Result Value Ref Range    Lipase 189 73 - 393 U/L   CBC WITH AUTOMATED DIFF    Collection Time: 04/16/17 12:02 PM   Result Value Ref Range    WBC 18.7 (H) 3.6 - 11.0 K/uL    RBC 5.08 3.80 - 5.20 M/uL    HGB 13.1 11.5 - 16.0 g/dL    HCT 40.3 35.0 - 47.0 %    MCV 79.3 (L) 80.0 - 99.0 FL    MCH 25.8 (L) 26.0 - 34.0 PG MCHC 32.5 30.0 - 36.5 g/dL    RDW 16.4 (H) 11.5 - 14.5 %    PLATELET 380 011 - 392 K/uL    NEUTROPHILS 90 (H) 32 - 75 %    LYMPHOCYTES 5 (L) 12 - 49 %    MONOCYTES 5 5 - 13 %    EOSINOPHILS 0 0 - 7 %    BASOPHILS 0 0 - 1 %    ABS. NEUTROPHILS 16.9 (H) 1.8 - 8.0 K/UL    ABS. LYMPHOCYTES 0.9 0.8 - 3.5 K/UL    ABS. MONOCYTES 0.9 0.0 - 1.0 K/UL    ABS. EOSINOPHILS 0.0 0.0 - 0.4 K/UL    ABS. BASOPHILS 0.0 0.0 - 0.1 K/UL    DF SMEAR SCANNED      RBC COMMENTS OVALOCYTES  PRESENT        RBC COMMENTS ANISOCYTOSIS  1+       URINALYSIS W/MICROSCOPIC    Collection Time: 04/16/17  1:13 PM   Result Value Ref Range    Color YELLOW/STRAW      Appearance CLEAR CLEAR      Specific gravity 1.020 1.003 - 1.030      pH (UA) 5.5 5.0 - 8.0      Protein TRACE (A) NEG mg/dL    Glucose NEGATIVE  NEG mg/dL    Ketone 15 (A) NEG mg/dL    Bilirubin NEGATIVE  NEG      Blood NEGATIVE  NEG      Urobilinogen 0.2 0.2 - 1.0 EU/dL    Nitrites NEGATIVE  NEG      Leukocyte Esterase NEGATIVE  NEG      WBC 0-4 0 - 4 /hpf    RBC 0-5 0 - 5 /hpf    Epithelial cells FEW FEW /lpf    Bacteria NEGATIVE  NEG /hpf    Hyaline cast 2-5 0 - 5 /lpf   EKG, 12 LEAD, INITIAL    Collection Time: 04/16/17  7:28 PM   Result Value Ref Range    Ventricular Rate 86 BPM    Atrial Rate 86 BPM    P-R Interval 140 ms    QRS Duration 80 ms    Q-T Interval 384 ms    QTC Calculation (Bezet) 459 ms    Calculated P Axis 63 degrees    Calculated R Axis -55 degrees    Calculated T Axis 58 degrees    Diagnosis       Normal sinus rhythm  Left anterior fascicular block  Abnormal ECG  When compared with ECG of 09-OCT-2015 18:05,  ST no longer elevated in Inferior leads         IMAGING RESULTS:  No orders to display     Ct Abd Pelv W Cont    Result Date: 4/16/2017  INDICATION: Abdominal pain and diarrhea for 3 days. COMPARISON: None TECHNIQUE: Following the uneventful intravenous administration of 100 cc Isovue-370, thin axial images were obtained through the abdomen and pelvis.  Coronal and sagittal reconstructions were generated. Oral contrast was not administered. CT dose reduction was achieved through use of a standardized protocol tailored for this examination and automatic exposure control for dose modulation. FINDINGS: LUNG BASES: Clear. INCIDENTALLY IMAGED HEART AND MEDIASTINUM: Coronary artery calcifications are present. LIVER: No mass or biliary dilatation. GALLBLADDER: Surgically absent. SPLEEN: No mass. PANCREAS: No mass or ductal dilatation. ADRENALS: Unremarkable. KIDNEYS: No mass, calculus, or hydronephrosis. STOMACH: Small hiatal hernia. SMALL BOWEL: No dilatation or wall thickening. COLON: No dilatation or wall thickening. APPENDIX: Normal. PERITONEUM: No ascites or pneumoperitoneum. Small fat-containing umbilical hernia. RETROPERITONEUM: No lymphadenopathy or aortic aneurysm. REPRODUCTIVE ORGANS: Status post hysterectomy. URINARY BLADDER: No mass or calculus. BONES: No destructive bone lesion. ADDITIONAL COMMENTS: N/A     IMPRESSION: No evidence of acute abdominal or pelvic process. Small hiatal hernia. Small fat-containing umbilical hernia. MEDICATIONS GIVEN:  Medications   sodium chloride 0.9 % bolus infusion 1,000 mL (0 mL IntraVENous IV Completed 4/16/17 2117)   famotidine (PF) (PEPCID) injection 20 mg (20 mg IntraVENous Given 4/16/17 2004)   prochlorperazine (COMPAZINE) injection 10 mg (10 mg IntraVENous Given 4/16/17 2004)   diphenhydrAMINE (BENADRYL) injection 25 mg (25 mg IntraVENous Given 4/16/17 2004)   HYDROcodone-acetaminophen (NORCO) 7.5-325 mg per tablet 1 Tab (1 Tab Oral Given 4/16/17 2120)       IMPRESSION:  1. Gastroenteritis, acute        PLAN:  1. Discharge Medication List as of 4/16/2017  9:06 PM      START taking these medications    Details   HYDROcodone-acetaminophen (NORCO) 5-325 mg per tablet Take 1 Tab by mouth every four (4) hours as needed for Pain.  Max Daily Amount: 6 Tabs., Print, Disp-12 Tab, R-0      promethazine (PHENERGAN) 25 mg tablet Take 1 Tab by mouth every six (6) hours as needed. , Print, Disp-12 Tab, R-0         CONTINUE these medications which have NOT CHANGED    Details   dicyclomine (BENTYL) 20 mg tablet Take 1 Tab by mouth every six (6) hours for 20 doses. , Print, Disp-20 Tab, R-0      ondansetron (ZOFRAN ODT) 4 mg disintegrating tablet Take 1 Tab by mouth every eight (8) hours as needed for Nausea. , Print, Disp-20 Tab, R-0      lisinopril (PRINIVIL, ZESTRIL) 5 mg tablet TAKE 1 TABLET BY MOUTH DAILY, Normal, Disp-90 Tab, R-3      rivaroxaban (XARELTO) 20 mg tab tablet Take  by mouth daily. , Historical Med      carvedilol (COREG) 12.5 mg tablet TAKE 1 TABLET BY MOUTH TWICE DAILY WITH MEALS, Normal, Disp-60 Tab, R-5      biotin 10,000 mcg cap Take  by mouth daily. , Historical Med      saxagliptin (ONGLYZA) 5 mg tab tablet Take 5 mg by mouth daily. , Historical Med      omeprazole (PRILOSEC) 20 mg capsule Take 20 mg by mouth daily. , Historical Med      DULoxetine (CYMBALTA) 60 mg capsule Take 60 mg by mouth daily. , Historical Med      cholecalciferol (VITAMIN D3) 1,000 unit tablet Take 1,000 Units by mouth daily. , Historical Med      azelastine-fluticasone (DYMISTA) 137-50 mcg/spray spry 137 mcg by Nasal route two (2) times a day., Historical Med      predniSONE (DELTASONE) 10 mg tablet Take 10 mg by mouth daily (with dinner). , Historical Med      B.infantis-B.ani-B.long-B.bifi (PROBIOTIC 4X) 10-15 mg TbEC Take 1 Tab by mouth daily. , Historical Med      bumetanide (BUMEX) 1 mg tablet Take  by mouth as needed., Historical Med      mometasone-formoterol (DULERA) 200-5 mcg/actuation HFA inhaler Take 2 Puffs by inhalation two (2) times a day., Historical Med      glipiZIDE SR (GLUCOTROL) 5 mg CR tablet Take 5 mg by mouth two (2) times a day., Historical Med         STOP taking these medications       diphenoxylate-atropine (LOMOTIL) 2.5-0.025 mg per tablet Comments:   Reason for Stoppin.   Follow-up Information     Follow up With Details Comments Contact Info    Trenton Gaytan MD In 2 days  [address] [facility]  [id number]  [phone]      [facility] EMERGENCY DEPT  As needed, If symptoms worsen [address] [facility]  [phone]        3.  Supportive care     Return to ED if worse       Corinne Sours, NP  9:51 PM

## 2017-04-16 NOTE — ED TRIAGE NOTES
Patient arrives with c/o abdominal pain and diarrhea since Thursday; patient was seen here earlier today and d/c with diagnosis of gastroenteritis and medications with no relief.

## 2017-04-17 LAB
ATRIAL RATE: 86 BPM
CALCULATED P AXIS, ECG09: 63 DEGREES
CALCULATED R AXIS, ECG10: -55 DEGREES
CALCULATED T AXIS, ECG11: 58 DEGREES
DIAGNOSIS, 93000: NORMAL
P-R INTERVAL, ECG05: 140 MS
Q-T INTERVAL, ECG07: 384 MS
QRS DURATION, ECG06: 80 MS
QTC CALCULATION (BEZET), ECG08: 459 MS
VENTRICULAR RATE, ECG03: 86 BPM

## 2017-04-17 NOTE — ED NOTES
I have reviewed discharge instructions with the patient. The patient verbalized understanding. Pt confirmed understanding of need for follow up with primary care provider. Pt is not in any current distress and shows no evidence of clinical instability. Pt left wheelchair  Pt family/friends are present. Pt left with all personal belongings. Pt states they are NOT driving. Pt states chief complaint has improved with treatment provided    PT is alert and oriented x 4, Pt is hemodynamically/respiratorily stable. Paperwork given by provider and reviewed with patient, opportunity for questions/clarification given.

## 2017-04-17 NOTE — DISCHARGE INSTRUCTIONS
Gastroenteritis: Care Instructions  Your Care Instructions  Gastroenteritis is an illness that may cause nausea, vomiting, and diarrhea. It is sometimes called \"stomach flu. \" It can be caused by bacteria or a virus. You will probably begin to feel better in 1 to 2 days. In the meantime, get plenty of rest and make sure you do not become dehydrated. Dehydration occurs when your body loses too much fluid. Follow-up care is a key part of your treatment and safety. Be sure to make and go to all appointments, and call your doctor if you are having problems. Its also a good idea to know your test results and keep a list of the medicines you take. How can you care for yourself at home? · If your doctor prescribed antibiotics, take them as directed. Do not stop taking them just because you feel better. You need to take the full course of antibiotics. · Drink plenty of fluids to prevent dehydration, enough so that your urine is light yellow or clear like water. Choose water and other caffeine-free clear liquids until you feel better. If you have kidney, heart, or liver disease and have to limit fluids, talk with your doctor before you increase your fluid intake. · Drink fluids slowly, in frequent, small amounts, because drinking too much too fast can cause vomiting. · Begin eating mild foods, such as dry toast, yogurt, applesauce, bananas, and rice. Avoid spicy, hot, or high-fat foods, and do not drink alcohol or caffeine for a day or two. Do not drink milk or eat ice cream until you are feeling better. How to prevent gastroenteritis  · Keep hot foods hot and cold foods cold. · Do not eat meats, dressings, salads, or other foods that have been kept at room temperature for more than 2 hours. · Use a thermometer to check your refrigerator. It should be between 34°F and 40°F.  · Defrost meats in the refrigerator or microwave, not on the kitchen counter. · Keep your hands and your kitchen clean.  Wash your hands, cutting boards, and countertops with hot soapy water frequently. · Cook meat until it is well done. · Do not eat raw eggs or uncooked sauces made with raw eggs. · Do not take chances. If food looks or tastes spoiled, throw it out. When should you call for help? Call 911 anytime you think you may need emergency care. For example, call if:  · You vomit blood or what looks like coffee grounds. · You passed out (lost consciousness). · You pass maroon or very bloody stools. Call your doctor now or seek immediate medical care if:  · You have severe belly pain. · You have signs of needing more fluids. You have sunken eyes, a dry mouth, and pass only a little dark urine. · You feel like you are going to faint. · You have increased belly pain that does not go away in 1 to 2 days. · You have new or increased nausea, or you are vomiting. · You have a new or higher fever. · Your stools are black and tarlike or have streaks of blood. Watch closely for changes in your health, and be sure to contact your doctor if:  · You are dizzy or lightheaded. · You urinate less than usual, or your urine is dark yellow or brown. · You do not feel better with each day that goes by. Where can you learn more? Go to http://eloina-sherman.info/. Enter N142 in the search box to learn more about \"Gastroenteritis: Care Instructions. \"  Current as of: May 24, 2016  Content Version: 11.2  © 2097-3305 [employer]. Care instructions adapted under license by [employer] (which disclaims liability or warranty for this information). If you have questions about a medical condition or this instruction, always ask your healthcare professional. [address] any warranty or liability for your use of this information. We hope that we have addressed all of your medical concerns.  The examination and treatment you received in the Emergency Department were for an emergent problem and were not intended as complete care. It is important that you follow up with your healthcare provider(s) for ongoing care. If your symptoms worsen or do not improve as expected, and you are unable to reach your usual health care provider(s), you should return to the Emergency Department. Today's healthcare is undergoing tremendous change, and patient satisfaction surveys are one of the many tools to assess the quality of medical care. You may receive a survey from the [employer] regarding your experience in the Emergency Department. I hope that your experience has been completely positive, particularly the medical care that I provided. As such, please participate in the survey; anything less than excellent does not meet my expectations or intentions. [address] [facility] and [address] [place] [address] participate in nationally recognized quality of care measures. If your blood pressure is greater than 120/80, as reported below, we urge that you seek medical care to address the potential of high blood pressure, commonly known as hypertension. Hypertension can be hereditary or can be caused by certain medical conditions, pain, stress, or \"white coat syndrome. \"       Please make an appointment with your health care provider(s) for follow up of your Emergency Department visit. VITALS:   Patient Vitals for the past 8 hrs:   Temp Pulse Resp BP SpO2   04/16/17 2030 - - - (!) 192/109 98 %   04/16/17 2015 - - - (!) 195/91 95 %   04/16/17 1924 97.3 °F (36.3 °C) (!) 105 18 (!) 184/97 93 %          Thank you for allowing us to provide you with medical care today. We realize that you have many choices for your emergency care needs. Please choose us in the future for any continued health care needs. Job Prerna Segura, MELINDA    [address] [facility].   Office: [phone]            Recent Results (from the past 24 hour(s)) METABOLIC PANEL, COMPREHENSIVE    Collection Time: 04/16/17 12:02 PM   Result Value Ref Range    Sodium 135 (L) 136 - 145 mmol/L    Potassium 4.2 3.5 - 5.1 mmol/L    Chloride 98 97 - 108 mmol/L    CO2 24 21 - 32 mmol/L    Anion gap 13 5 - 15 mmol/L    Glucose 188 (H) 65 - 100 mg/dL    BUN 13 6 - 20 MG/DL    Creatinine 1.05 (H) 0.55 - 1.02 MG/DL    BUN/Creatinine ratio 12 12 - 20      GFR est AA >60 >60 ml/min/1.73m2    GFR est non-AA 52 (L) >60 ml/min/1.73m2    Calcium 8.7 8.5 - 10.1 MG/DL    Bilirubin, total 0.7 0.2 - 1.0 MG/DL    ALT (SGPT) 26 12 - 78 U/L    AST (SGOT) 14 (L) 15 - 37 U/L    Alk. phosphatase 122 (H) 45 - 117 U/L    Protein, total 7.3 6.4 - 8.2 g/dL    Albumin 3.2 (L) 3.5 - 5.0 g/dL    Globulin 4.1 (H) 2.0 - 4.0 g/dL    A-G Ratio 0.8 (L) 1.1 - 2.2     LIPASE    Collection Time: 04/16/17 12:02 PM   Result Value Ref Range    Lipase 189 73 - 393 U/L   CBC WITH AUTOMATED DIFF    Collection Time: 04/16/17 12:02 PM   Result Value Ref Range    WBC 18.7 (H) 3.6 - 11.0 K/uL    RBC 5.08 3.80 - 5.20 M/uL    HGB 13.1 11.5 - 16.0 g/dL    HCT 40.3 35.0 - 47.0 %    MCV 79.3 (L) 80.0 - 99.0 FL    MCH 25.8 (L) 26.0 - 34.0 PG    MCHC 32.5 30.0 - 36.5 g/dL    RDW 16.4 (H) 11.5 - 14.5 %    PLATELET 812 086 - 042 K/uL    NEUTROPHILS 90 (H) 32 - 75 %    LYMPHOCYTES 5 (L) 12 - 49 %    MONOCYTES 5 5 - 13 %    EOSINOPHILS 0 0 - 7 %    BASOPHILS 0 0 - 1 %    ABS. NEUTROPHILS 16.9 (H) 1.8 - 8.0 K/UL    ABS. LYMPHOCYTES 0.9 0.8 - 3.5 K/UL    ABS. MONOCYTES 0.9 0.0 - 1.0 K/UL    ABS. EOSINOPHILS 0.0 0.0 - 0.4 K/UL    ABS.  BASOPHILS 0.0 0.0 - 0.1 K/UL    DF SMEAR SCANNED      RBC COMMENTS OVALOCYTES  PRESENT        RBC COMMENTS ANISOCYTOSIS  1+       URINALYSIS W/MICROSCOPIC    Collection Time: 04/16/17  1:13 PM   Result Value Ref Range    Color YELLOW/STRAW      Appearance CLEAR CLEAR      Specific gravity 1.020 1.003 - 1.030      pH (UA) 5.5 5.0 - 8.0      Protein TRACE (A) NEG mg/dL    Glucose NEGATIVE  NEG mg/dL    Ketone 15 (A) NEG mg/dL    Bilirubin NEGATIVE  NEG      Blood NEGATIVE  NEG      Urobilinogen 0.2 0.2 - 1.0 EU/dL    Nitrites NEGATIVE  NEG      Leukocyte Esterase NEGATIVE  NEG      WBC 0-4 0 - 4 /hpf    RBC 0-5 0 - 5 /hpf    Epithelial cells FEW FEW /lpf    Bacteria NEGATIVE  NEG /hpf    Hyaline cast 2-5 0 - 5 /lpf   EKG, 12 LEAD, INITIAL    Collection Time: 04/16/17  7:28 PM   Result Value Ref Range    Ventricular Rate 86 BPM    Atrial Rate 86 BPM    P-R Interval 140 ms    QRS Duration 80 ms    Q-T Interval 384 ms    QTC Calculation (Bezet) 459 ms    Calculated P Axis 63 degrees    Calculated R Axis -55 degrees    Calculated T Axis 58 degrees    Diagnosis       Normal sinus rhythm  Left anterior fascicular block  Abnormal ECG  When compared with ECG of 09-OCT-2015 18:05,  ST no longer elevated in Inferior leads         Ct Abd Pelv W Cont    Result Date: 4/16/2017  INDICATION: Abdominal pain and diarrhea for 3 days. COMPARISON: None TECHNIQUE: Following the uneventful intravenous administration of 100 cc Isovue-370, thin axial images were obtained through the abdomen and pelvis. Coronal and sagittal reconstructions were generated. Oral contrast was not administered. CT dose reduction was achieved through use of a standardized protocol tailored for this examination and automatic exposure control for dose modulation. FINDINGS: LUNG BASES: Clear. INCIDENTALLY IMAGED HEART AND MEDIASTINUM: Coronary artery calcifications are present. LIVER: No mass or biliary dilatation. GALLBLADDER: Surgically absent. SPLEEN: No mass. PANCREAS: No mass or ductal dilatation. ADRENALS: Unremarkable. KIDNEYS: No mass, calculus, or hydronephrosis. STOMACH: Small hiatal hernia. SMALL BOWEL: No dilatation or wall thickening. COLON: No dilatation or wall thickening. APPENDIX: Normal. PERITONEUM: No ascites or pneumoperitoneum. Small fat-containing umbilical hernia. RETROPERITONEUM: No lymphadenopathy or aortic aneurysm.  REPRODUCTIVE ORGANS: Status post hysterectomy. URINARY BLADDER: No mass or calculus. BONES: No destructive bone lesion. ADDITIONAL COMMENTS: N/A     IMPRESSION: No evidence of acute abdominal or pelvic process. Small hiatal hernia. Small fat-containing umbilical hernia.

## 2017-07-10 RX ORDER — CARVEDILOL 12.5 MG/1
12.5 TABLET ORAL 2 TIMES DAILY WITH MEALS
Qty: 60 TAB | Refills: 0 | Status: SHIPPED | OUTPATIENT
Start: 2017-07-10 | End: 2017-09-19 | Stop reason: SDUPTHER

## 2017-07-10 NOTE — TELEPHONE ENCOUNTER
Refill is per verbal order of Dr. Yamilet Mills. Requested Prescriptions     Pending Prescriptions Disp Refills    carvedilol (COREG) 12.5 mg tablet [Pharmacy Med Name: CARVEDILOL 12.5MG TABLETS] 60 Tab 0     Sig: Take 1 Tab by mouth two (2) times daily (with meals).

## 2017-07-18 ENCOUNTER — HOSPITAL ENCOUNTER (OUTPATIENT)
Dept: WOUND CARE | Age: 72
Discharge: HOME | End: 2017-07-18
Payer: MEDICARE

## 2017-07-18 PROCEDURE — 11043 DBRDMT MUSC&/FSCA 1ST 20/<: CPT

## 2017-07-18 PROCEDURE — 10140 I&D HMTMA SEROMA/FLUID COLLJ: CPT

## 2017-07-18 RX ORDER — CEPHALEXIN 500 MG/1
500 CAPSULE ORAL 3 TIMES DAILY
COMMUNITY
End: 2017-08-15 | Stop reason: ALTCHOICE

## 2017-07-18 RX ORDER — LIDOCAINE HYDROCHLORIDE 20 MG/ML
JELLY TOPICAL AS NEEDED
Status: DISCONTINUED | OUTPATIENT
Start: 2017-07-18 | End: 2017-07-22 | Stop reason: HOSPADM

## 2017-07-18 RX ADMIN — LIDOCAINE HYDROCHLORIDE 5 ML: 20 JELLY TOPICAL at 10:48

## 2017-07-18 NOTE — H&P
Wound Center  History and Physical    Subjective:     Chief Complaint:  Julien Oliva is a @AGE female who presents with L lower leg anterior wound of 1 weeks duration. Referred by:  self    HPI:   Wound caused by: acute injury due to hitting front of leg on table  Current wound care:  Offloading wound: no  Appetite: good  Wound associated pain: intense  Diabetic: +  Smoker: -  ROS: no N/V, no T/chills; no local rash  Past Medical History:   Diagnosis Date    Asthma     Autoimmune disease ([facility] [address] [address]     fibromyalgia    CAD (coronary artery disease) 10/9/2015    Diabetes [facility]     DVT (deep vein thrombosis) in pregnancy ([facility]     GERD (gastroesophageal reflux disease)     HTN (hypertension)     NSTEMI (non-ST elevated myocardial infarction) ([facility] 10/9/2015    Sleep apnea     wears CPAP      Past Surgical History:   Procedure Laterality Date    ABDOMEN SURGERY PROC UNLISTED      hital hernia repair, gall bladder removed    BREAST SURGERY PROCEDURE UNLISTED      lumpectomy    CARDIAC SURG PROCEDURE UNLIST  10/9/15    2 stents    HX  SECTION      HX COLOSTOMY      and reversal, post episiotomy repair    HX HYSTERECTOMY  1970    HX UROLOGICAL  2009    bladder sling     Family History   Problem Relation Age of Onset    Diabetes Mother     Heart Failure Mother     Kidney Disease Mother     Diabetes Father     Heart Disease Father     Elevated Lipids Father     Heart Failure Father     Heart Disease Brother     Cancer Brother      kidney    Diabetes Brother     No Known Problems Brother     No Known Problems Brother       Social History   Substance Use Topics    Smoking status: Current Every Day Smoker    Smokeless tobacco: Not on file    Alcohol use 0.0 oz/week     0 Standard drinks or equivalent per week      Comment: very very rarely       Prior to Admission medications    Medication Sig Start Date End Date Taking?  Authorizing Provider   cephALEXin (KEFLEX) 500 mg capsule Take 500 mg by mouth three (3) times daily. Indications: SKIN AND SKIN STRUCTURE STREP. PYOGENES INFECTION   Yes Historical Provider   carvedilol (COREG) 12.5 mg tablet Take 1 Tab by mouth two (2) times daily (with meals). 7/10/17   Stephenie Baird MD   HYDROcodone-acetaminophen (NORCO) 5-325 mg per tablet Take 1 Tab by mouth every four (4) hours as needed for Pain. Max Daily Amount: 6 Tabs. 4/16/17   Nikole Landeros NP   promethazine (PHENERGAN) 25 mg tablet Take 1 Tab by mouth every six (6) hours as needed. 4/16/17   Nikole Landeros NP   lisinopril (PRINIVIL, ZESTRIL) 5 mg tablet TAKE 1 TABLET BY MOUTH DAILY 2/10/17   Stephenie Baird MD   rivaroxaban (XARELTO) 20 mg tab tablet Take  by mouth daily. Historical Provider   biotin 10,000 mcg cap Take  by mouth daily. Historical Provider   bumetanide (BUMEX) 1 mg tablet Take  by mouth as needed. Historical Provider   saxagliptin (ONGLYZA) 5 mg tab tablet Take 5 mg by mouth daily. Historical Provider   omeprazole (PRILOSEC) 20 mg capsule Take 20 mg by mouth daily. Historical Provider   DULoxetine (CYMBALTA) 60 mg capsule Take 60 mg by mouth daily. Historical Provider   cholecalciferol (VITAMIN D3) 1,000 unit tablet Take 1,000 Units by mouth daily. Historical Provider   azelastine-fluticasone (DYMISTA) 137-50 mcg/spray spry 137 mcg by Nasal route two (2) times a day. Historical Provider   mometasone-formoterol (DULERA) 200-5 mcg/actuation HFA inhaler Take 2 Puffs by inhalation two (2) times a day. Historical Provider   predniSONE (DELTASONE) 10 mg tablet Take 10 mg by mouth daily (with dinner). Historical Provider   B.infantis-B.ani-B.long-B.bifi (PROBIOTIC 4X) 10-15 mg TbEC Take 1 Tab by mouth daily.     Historical Provider     Allergies   Allergen Reactions    Advair Diskus [Fluticasone-Salmeterol] Rash    Ciprofloxacin Rash    Statins-Hmg-Coa Reductase Inhibitors Other (comments)     Muscle pain    Sulfa (Sulfonamide Antibiotics) Rash    Tetracycline Other (comments)     musclepain        Review of Systems:  A comprehensive review of systems was negative except for that written in the History of Present Illness. Objective:     Physical Exam:      General: well developed, well nourished, pleasant , NAD. Hygiene good  Psych: cooperative. Pleasant. No anxiety or depression. Normal mood and affect. Neuro: alert and oriented to person/place/situation. Otherwise nonfocal.  HEENT: Normocephalic, atraumatic. EOMI. Conjunctiva clear. No scleral icterus. Neck: Normal range of motion. No masses. Chest: Good air entry bilaterally. Respirations nonlabored  Abdomen: Soft, nontender, nondistended, normoactive bowel sounds  Lower extremities: color normal; temperature normal. Hair growth is not present. Calves are supple, nontender, approximately equally sized in comparison. Capillary refill <3 sec  Focussed Lower Extremity Exam:  Vascular exam:  Left lower extremity: severe  Edema,front of leg,   DP pulse : 2+  PT pulse: 2+  Nails dystrophic     Right lower extremity: mild  edema, foot /leg,   DP pulse : 2+  PT pulse: 2+   Nails dystrophic    Ulcer Description:   Etiology: combined  Location: L lower leg anterior  Measurement: in cm  Pre/post debridement 1.4 x 1.8 x 0.1 - surgical incision aprx 3 cm     Ulcer bed: N/A - hematoma   Periwound: Indurated   Exudate: Large amount Bloody exudate    Data Review:   No results found for this or any previous visit (from the past 24 hour(s)). Assessment:     73 y.o. female with L lower leg anterior hematoma due to traumatic injury. Plan:     Wound debridement + - incision and drainage of hematoma with 20cc of 1% lidocaine injected around hematoma , 15 blade for incision and gauze forceps with irriagion of wound to drain significant amount of clot in area  Dressing: Mesalt packing with DSD   Frequency: every other day. Rx augmenting 875 mg PO BID.   Rx for Vicodin 5/325 # 30 Q 4-6 hr prn pain.  Pt did state area felt much better. Patient understood and agrees with plan. Questions answered. Weekly visits and serial debridements also discussed.   Follow up with me in 1 week    Signed By: Vijaya Love DPM     July 18, 2017

## 2017-07-25 ENCOUNTER — HOSPITAL ENCOUNTER (OUTPATIENT)
Dept: WOUND CARE | Age: 72
Discharge: HOME | End: 2017-07-25
Payer: MEDICARE

## 2017-07-25 PROCEDURE — 11042 DBRDMT SUBQ TIS 1ST 20SQCM/<: CPT

## 2017-07-25 RX ORDER — LIDOCAINE HYDROCHLORIDE 20 MG/ML
JELLY TOPICAL AS NEEDED
Status: DISCONTINUED | OUTPATIENT
Start: 2017-07-25 | End: 2017-07-29 | Stop reason: HOSPADM

## 2017-07-25 RX ADMIN — LIDOCAINE HYDROCHLORIDE: 20 JELLY TOPICAL at 11:03

## 2017-07-25 NOTE — PROGRESS NOTES
Wound Center  Progress Note    Subjective:   Patient is for follow up of LE ulcer(s). Patient is doing well. No concerns are reported. No difficulty or problems with wound care. Wound care is being performed by staff/pt and consists of dressing changes and offloading wound(s). No complaints of wound pain. There have been no changes in patient's medical history in the interim. ROS:  No fever or chills. No rash. No pain at site of wound    Objective:   General: NAD  Psych: Cooperative, no anxiety or depression  Neuro: Alert, oriented to person/place/situation. Otherwise nonfocal.  Extremities: Bilateral moderate pitting edema is noted. Skin color is normal.   Vascular exam:  No gross changes in pedal pulses. Capillary refill is intact, <3sec. Dermatologic:  Skin color appears normal for patient. Skin turgor is normal. Dystrophic nails are seen on the feet bilaterally. Ulcer Description:   Measurement: in cm pre/post debridement:  2.5 x 0.9 x 1.3 / same  Ulcer bed: Mixed Granular/Fibrotic    Periwound: Edematous, mildtender  Exudate: Moderate amount Serosanguinous exudate  Odor:  -    Assessment:    L lower leg anterior hematoma due to traumatic injury   Unspecified open wound left lower leg S81.802D 2* to above hematoma    Plan:    Dressing: Laila Piano / Gauze Frequency: every other day        Plan is reviewed with patient who expresses understanding. Questions were answered. Patient is to follow up with me in 1 wk. Yandel Shin DPM        Ulcer assessment: Due to presence of necrotic tissue within the wound bed, ulcer requires debridement. Procedure: Debridement:   The indication for debridement was reviewed with patient. Risks of procedure (bleeding, infection, pain) were discussed with patient and consent signed.  Questions were answered    Subcutaneous excisional debridement   Indication: to remove necrotic tissue/ devitalized tissue/ soft eschar/ infected tissue/obtain deep wound culture through subcutaneous layer of wound bed  Consent in chart   Anesthesia: Topical 2% lidocaine jelly  Instrument: 15 Blade,    Residual Necrosis: Present and scored   Bleeding: <1ml   Hemostasis: Pressure   Patient tolerated procedure well   Procedural Pain: mild  Post - procedural pain: none    Post debridement measurements: see progress note.   Surface area debrided: <20 sq. cm

## 2017-08-01 ENCOUNTER — HOSPITAL ENCOUNTER (OUTPATIENT)
Dept: WOUND CARE | Age: 72
Discharge: HOME | End: 2017-08-01
Payer: MEDICARE

## 2017-08-01 PROCEDURE — 11042 DBRDMT SUBQ TIS 1ST 20SQCM/<: CPT

## 2017-08-01 NOTE — PROGRESS NOTES
Wound Center  Progress Note    Subjective:   Patient is for follow up of LE ulcer(s). Patient is doing well. No concerns are reported. No difficulty or problems with wound care. Wound care is being performed by staff/pt and consists of dressing changes and offloading wound(s). No complaints of wound pain. There have been no changes in patient's medical history in the interim. ROS:  No fever or chills. No rash. No pain at site of wound    Objective:   General: NAD  Psych: Cooperative, no anxiety or depression  Neuro: Alert, oriented to person/place/situation. Otherwise nonfocal.  Extremities: Bilateral moderate pitting edema is noted. Skin color is normal.   Vascular exam:  No gross changes in pedal pulses. Capillary refill is intact, <3sec. Dermatologic:  Skin color appears normal for patient. Skin turgor is normal. Dystrophic nails are seen on the feet bilaterally. Ulcer Description:   Measurement: in cm pre/post debridement:  1.8 x 1.4  x 0.7 / same - 2 tunnels noted at 9 and 12 with cont drainage of hematoma  Ulcer bed: Mixed Granular/Fibrotic    Periwound: Reddened, nontender  Exudate: Moderate amount Serosanguinous exudate  Odor:  -    Assessment:  L lower leg anterior hematoma due to traumatic injury   Unspecified open wound left lower leg S81.802D 2* to above hematoma    Plan:    Dressing: Mesalt/ gauze Frequency: daily        Plan is reviewed with patient who expresses understanding. Questions were answered. Patient is to follow up with me in 1 wk. Yandel Black, [facility]        Ulcer assessment: Due to presence of necrotic tissue within the wound bed, ulcer requires debridement. Procedure: Debridement:   The indication for debridement was reviewed with patient. Risks of procedure (bleeding, infection, pain) were discussed with patient and consent signed.  Questions were answered    Subcutaneous excisional debridement   Indication: to remove necrotic tissue/ devitalized tissue/ soft eschar/ infected tissue/obtain deep wound culture through subcutaneous layer of wound bed  Consent in chart   Anesthesia: Topical 2% lidocaine jelly  Instrument: 15 Blade,    Residual Necrosis: Present and scored   Bleeding: <1ml   Hemostasis: Pressure   Patient tolerated procedure well   Procedural Pain: none  Post - procedural pain: none    Post debridement measurements: see progress note.   Surface area debrided: <20 sq. cm

## 2017-08-08 ENCOUNTER — HOSPITAL ENCOUNTER (OUTPATIENT)
Dept: WOUND CARE | Age: 72
Discharge: HOME | End: 2017-08-08
Payer: MEDICARE

## 2017-08-08 PROCEDURE — 11042 DBRDMT SUBQ TIS 1ST 20SQCM/<: CPT

## 2017-08-08 RX ORDER — LIDOCAINE HYDROCHLORIDE 20 MG/ML
JELLY TOPICAL ONCE
Status: COMPLETED | OUTPATIENT
Start: 2017-08-08 | End: 2017-08-08

## 2017-08-08 RX ADMIN — LIDOCAINE HYDROCHLORIDE 5 ML: 20 JELLY TOPICAL at 11:20

## 2017-08-08 NOTE — PROGRESS NOTES
Wound Center  Progress Note    Subjective:   Patient is for follow up of LE ulcer(s). Patient is doing well. No concerns are reported. No difficulty or problems with wound care. Wound care is being performed by staff/pt and consists of dressing changes and offloading wound(s). No complaints of wound pain. There have been no changes in patient's medical history in the interim. ROS:  No fever or chills. No rash. No pain at site of wound    Objective:   General: NAD  Psych: Cooperative, no anxiety or depression  Neuro: Alert, oriented to person/place/situation. Otherwise nonfocal.  Extremities: Bilateral moderate pitting edema is noted. Skin color is normal.   Vascular exam:  No gross changes in pedal pulses. Capillary refill is intact, <3sec. Dermatologic:  Skin color appears normal for patient. Skin turgor is normal. Dystrophic nails are seen on the feet bilaterally. Ulcer Description:   Measurement: in cm pre/post debridement:  1.5 x 1.0 x 0.6 with 1.0 cm undermining at inferior portion of wound  Ulcer bed: Mixed Granular/Fibrotic    Periwound: Calloused, nontender  Exudate: Moderate amount Serous exudate  Odor:  -    Assessment:  L lower leg anterior hematoma due to traumatic injury   Unspecified open wound left lower leg S81.802D 2* to above hematoma (no longer draining any bloody fluid)  Converting to venous stasis wound L leg i83.022  Venous insufficiency i87.2 - single tubi for compression, elevation - discussed need to control edema  Plan:    Dressing: Mesalt/gauze Frequency: every other day        Plan is reviewed with patient who expresses understanding. Questions were answered. Patient is to follow up with me in 1 wk. Yandel Raymond DPM        Ulcer assessment: Due to presence of necrotic tissue within the wound bed, ulcer requires debridement. Procedure: Debridement:   The indication for debridement was reviewed with patient.  Risks of procedure (bleeding, infection, pain) were discussed with patient and consent signed. Questions were answered    Subcutaneous excisional debridement   Indication: to remove necrotic tissue/ devitalized tissue/ soft eschar/ infected tissue/obtain deep wound culture through subcutaneous layer of wound bed  Consent in chart   Anesthesia: Topical 2% lidocaine jelly  Instrument: 15 Blade,    Residual Necrosis: Present and scored   Bleeding: <1ml   Hemostasis: Pressure   Patient tolerated procedure well   Procedural Pain: none  Post - procedural pain: none    Post debridement measurements: see progress note.   Surface area debrided: <20 sq. cm

## 2017-08-15 ENCOUNTER — HOSPITAL ENCOUNTER (OUTPATIENT)
Dept: WOUND CARE | Age: 72
Discharge: HOME | End: 2017-08-15
Payer: MEDICARE

## 2017-08-15 PROCEDURE — 11042 DBRDMT SUBQ TIS 1ST 20SQCM/<: CPT

## 2017-08-15 NOTE — PROGRESS NOTES
Wound Center  Progress Note    Subjective:   Patient is for follow up of LE ulcer(s). Patient is doing well. No concerns are reported. No difficulty or problems with wound care. Wound care is being performed by staff/pt and consists of dressing changes and offloading wound(s). No complaints of wound pain. There have been no changes in patient's medical history in the interim. ROS:  No fever or chills. No rash. No pain at site of wound    Objective:   General: NAD  Psych: Cooperative, no anxiety or depression  Neuro: Alert, oriented to person/place/situation. Otherwise nonfocal.  Extremities: Bilateral moderate pitting edema is noted. Skin color is normal.   Vascular exam:  No gross changes in pedal pulses. Capillary refill is intact, <3sec. Dermatologic:  Skin color appears normal for patient. Skin turgor is normal. Dystrophic nails are seen on the feet bilaterally. Ulcer Description:   Measurement: in cm pre/post debridement:  1.3 x 1.0 x 0.7 / same  Ulcer bed: Mixed Granular/Fibrotic    Periwound: Edematous, mildtender  Exudate: Moderate amount Serosanguinous exudate  Odor:  -    Assessment:  L lower leg anterior hematoma due to traumatic injury   Unspecified open wound left lower leg S81.802D 2* to above hematoma (no longer draining any bloody fluid)  Converting to venous stasis wound L leg i83.022  Venous insufficiency i87.2 - single tubi for compression, elevation - discussed need to control edema    Plan:    Dressing: Mesalt / gauze Frequency: every other day        Plan is reviewed with patient who expresses understanding. Questions were answered. Patient is to follow up with me in 1 wk. Yandel Baxter DPM        Ulcer assessment: Due to presence of necrotic tissue within the wound bed, ulcer requires debridement. Procedure: Debridement:   The indication for debridement was reviewed with patient.  Risks of procedure (bleeding, infection, pain) were discussed with patient and consent signed. Questions were answered    Subcutaneous excisional debridement   Indication: to remove necrotic tissue/ devitalized tissue/ soft eschar/ infected tissue/obtain deep wound culture through subcutaneous layer of wound bed  Consent in chart   Anesthesia: Topical 2% lidocaine jelly  Instrument: 15 Blade,    Residual Necrosis: Present and scored   Bleeding: <1ml   Hemostasis: Pressure   Patient tolerated procedure well   Procedural Pain: mild  Post - procedural pain: none    Post debridement measurements: see progress note.   Surface area debrided: <20 sq. cm

## 2017-08-22 ENCOUNTER — HOSPITAL ENCOUNTER (OUTPATIENT)
Dept: WOUND CARE | Age: 72
Discharge: HOME | End: 2017-08-22
Payer: MEDICARE

## 2017-08-22 PROCEDURE — 11042 DBRDMT SUBQ TIS 1ST 20SQCM/<: CPT

## 2017-08-22 RX ORDER — LIDOCAINE HYDROCHLORIDE 20 MG/ML
JELLY TOPICAL
Status: COMPLETED | OUTPATIENT
Start: 2017-08-22 | End: 2017-08-22

## 2017-08-22 RX ADMIN — LIDOCAINE HYDROCHLORIDE: 20 JELLY TOPICAL at 10:18

## 2017-08-22 NOTE — PROGRESS NOTES
Wound Center  Progress Note    Subjective:   Patient is for follow up of LE ulcer(s). Patient is doing well. No concerns are reported. No difficulty or problems with wound care. Wound care is being performed by staff/pt and consists of dressing changes and offloading wound(s). No complaints of wound pain. There have been no changes in patient's medical history in the interim. ROS:  No fever or chills. No rash. No pain at site of wound    Objective:   General: NAD  Psych: Cooperative, no anxiety or depression  Neuro: Alert, oriented to person/place/situation. Otherwise nonfocal.  Extremities: Bilateral moderate pitting edema is noted. Skin color is normal.   Vascular exam:  No gross changes in pedal pulses. Capillary refill is intact, <3sec. Dermatologic:  Skin color appears normal for patient. Skin turgor is normal. Dystrophic nails are seen on the feet bilaterally. Ulcer Description:   Measurement: in cm pre/post debridement:  1.2 x 0.7 x 0.5 / same  Ulcer bed: Mixed Granular/Fibrotic    Periwound: Edematous, mildtender  Exudate: Moderate amount Serosanguinous exudate  Odor:  -     Assessment:  L lower leg anterior hematoma due to traumatic injury   Unspecified open wound left lower leg S81.802D 2* to above hematoma (no longer draining any bloody fluid)  Converting to venous stasis wound L leg i83.022  Venous insufficiency i87.2 - single tubi for compression, elevation - discussed need to control edema     Plan:     Dressing: Mesalt / gauze Frequency: every other day    Plan is reviewed with patient who expresses understanding. Questions were answered. Patient is to follow up with me in 1 wk. Yandel Conway DPM        Ulcer assessment: Due to presence of necrotic tissue within the wound bed, ulcer requires debridement. Procedure: Debridement:   The indication for debridement was reviewed with patient.  Risks of procedure (bleeding, infection, pain) were discussed with patient and consent signed. Questions were answered    Subcutaneous excisional debridement   Indication: to remove necrotic tissue/ devitalized tissue/ soft eschar/ infected tissue/obtain deep wound culture through subcutaneous layer of wound bed  Consent in chart   Anesthesia: Topical 2% lidocaine jelly  Instrument: 15 Blade,    Residual Necrosis: Present and scored   Bleeding: <1ml   Hemostasis: Pressure   Patient tolerated procedure well   Procedural Pain: none  Post - procedural pain: none    Post debridement measurements: see progress note.   Surface area debrided: <20 sq. cm

## 2017-08-29 ENCOUNTER — HOSPITAL ENCOUNTER (OUTPATIENT)
Dept: WOUND CARE | Age: 72
Discharge: HOME | End: 2017-08-29
Payer: MEDICARE

## 2017-08-29 PROCEDURE — 11042 DBRDMT SUBQ TIS 1ST 20SQCM/<: CPT

## 2017-08-29 RX ORDER — LIDOCAINE 40 MG/G
CREAM TOPICAL AS NEEDED
Status: DISCONTINUED | OUTPATIENT
Start: 2017-08-29 | End: 2017-09-02 | Stop reason: HOSPADM

## 2017-08-29 RX ADMIN — LIDOCAINE 1 TUBE: 40 CREAM TOPICAL at 10:44

## 2017-08-29 NOTE — PROGRESS NOTES
Wound Center  Progress Note    Subjective:   Patient is for follow up of LE ulcer(s). Patient is doing well. No concerns are reported. No difficulty or problems with wound care. Wound care is being performed by staff/pt and consists of dressing changes and offloading wound(s). No complaints of wound pain. There have been no changes in patient's medical history in the interim. ROS:  No fever or chills. No rash. No pain at site of wound    Objective:   General: NAD  Psych: Cooperative, no anxiety or depression  Neuro: Alert, oriented to person/place/situation. Otherwise nonfocal.  Extremities: Bilateral moderate pitting edema is noted. Skin color is normal.   Vascular exam:  No gross changes in pedal pulses. Capillary refill is intact, <3sec. Dermatologic:  Skin color appears normal for patient. Skin turgor is normal. Dystrophic nails are seen on the feet bilaterally. Ulcer Description:   Measurement: in cm pre/post debridement:    0.7 x 0.5 x 0.5 / same inc depth to 0.6  Ulcer bed: Mixed Granular/Fibrotic    Periwound: Edematous, mildtender  Exudate: Moderate amount Serosanguinous exudate  Odor:  -      Assessment:  L lower leg anterior hematoma due to traumatic injury   Unspecified open wound left lower leg S81.802D 2* to above hematoma (no longer draining any bloody fluid)  Converting to venous stasis wound L leg i83.022  Venous insufficiency i87.2 - single tubi for compression, elevation - discussed need to control edema      Plan:      Dressing: Mesalt / gauze Frequency: every other day        Plan is reviewed with patient who expresses understanding. Questions were answered. Patient is to follow up with me in 1 wk. Yandel Black, DPM        Ulcer assessment: Due to presence of necrotic tissue within the wound bed, ulcer requires debridement. Procedure: Debridement:   The indication for debridement was reviewed with patient.  Risks of procedure (bleeding, infection, pain) were discussed with patient and consent signed. Questions were answered    Subcutaneous excisional debridement   Indication: to remove necrotic tissue/ devitalized tissue/ soft eschar/ infected tissue/obtain deep wound culture through subcutaneous layer of wound bed  Consent in chart   Anesthesia: Topical 2% lidocaine jelly  Instrument: 15 Blade,    Residual Necrosis: Present and scored   Bleeding: <1ml   Hemostasis: Pressure   Patient tolerated procedure well   Procedural Pain: mild  Post - procedural pain: none    Post debridement measurements: see progress note.   Surface area debrided: <20 sq. cm

## 2017-09-05 ENCOUNTER — HOSPITAL ENCOUNTER (OUTPATIENT)
Dept: WOUND CARE | Age: 72
Discharge: HOME | End: 2017-09-05
Payer: MEDICARE

## 2017-09-05 PROCEDURE — 11042 DBRDMT SUBQ TIS 1ST 20SQCM/<: CPT

## 2017-09-05 RX ORDER — LIDOCAINE 40 MG/G
CREAM TOPICAL
Status: COMPLETED | OUTPATIENT
Start: 2017-09-05 | End: 2017-09-05

## 2017-09-05 RX ADMIN — LIDOCAINE 1 TUBE: 40 CREAM TOPICAL at 10:31

## 2017-09-05 NOTE — PROGRESS NOTES
Wound Center  Progress Note    Subjective:   Patient is for follow up of LE ulcer(s). Patient is doing well. No concerns are reported. No difficulty or problems with wound care. Wound care is being performed by staff/pt and consists of dressing changes and offloading wound(s). No complaints of wound pain. There have been no changes in patient's medical history in the interim. ROS:  No fever or chills. No rash. No pain at site of wound    Objective:   General: NAD  Psych: Cooperative, no anxiety or depression  Neuro: Alert, oriented to person/place/situation. Otherwise nonfocal.  Extremities: Bilateral moderate pitting edema is noted. Skin color is normal.   Vascular exam:  No gross changes in pedal pulses. Capillary refill is intact, <3sec. Dermatologic:  Skin color appears normal for patient. Skin turgor is normal. Dystrophic nails are seen on the feet bilaterally. Ulcer Description:   Measurement: in cm pre/post debridement:    0.5 x 0.5 x 0.3 / same inc depth to 0.4  Ulcer bed: Mixed Granular/Fibrotic    Periwound: Edematous, mildtender  Exudate: Moderate amount Serosanguinous exudate  Odor:  -      Assessment:  L lower leg anterior hematoma due to traumatic injury   Unspecified open wound left lower leg S81.802D 2* to above hematoma (no longer draining any bloody fluid)  Converting to venous stasis wound L leg i83.022  Venous insufficiency i87.2 - single tubi for compression, elevation - discussed need to control edema      Plan:      Dressing: Promogram Frequency: every other day  D/c mesalt      Plan is reviewed with patient who expresses understanding. Questions were answered. Patient is to follow up with me in 1 wk. Yandel Taylor DPM        Ulcer assessment: Due to presence of necrotic tissue within the wound bed, ulcer requires debridement. Procedure: Debridement:   The indication for debridement was reviewed with patient.  Risks of procedure (bleeding, infection, pain) were discussed with patient and consent signed. Questions were answered    Subcutaneous excisional debridement   Indication: to remove necrotic tissue/ devitalized tissue/ soft eschar/ infected tissue/obtain deep wound culture through subcutaneous layer of wound bed  Consent in chart   Anesthesia: Topical 2% lidocaine jelly  Instrument: 15 Blade,    Residual Necrosis: Present and scored   Bleeding: <1ml   Hemostasis: Pressure   Patient tolerated procedure well   Procedural Pain: none  Post - procedural pain: none    Post debridement measurements: see progress note.   Surface area debrided: <20 sq. cm

## 2017-09-12 ENCOUNTER — HOSPITAL ENCOUNTER (OUTPATIENT)
Dept: WOUND CARE | Age: 72
Discharge: HOME | End: 2017-09-12
Payer: MEDICARE

## 2017-09-12 PROCEDURE — 11042 DBRDMT SUBQ TIS 1ST 20SQCM/<: CPT

## 2017-09-12 RX ORDER — LIDOCAINE HYDROCHLORIDE 20 MG/ML
JELLY TOPICAL ONCE
Status: COMPLETED | OUTPATIENT
Start: 2017-09-12 | End: 2017-09-12

## 2017-09-12 RX ADMIN — LIDOCAINE HYDROCHLORIDE 5 ML: 20 JELLY TOPICAL at 10:54

## 2017-09-12 NOTE — PROGRESS NOTES
Wound Center  Progress Note    Subjective:   Patient is for follow up of LE ulcer(s). Patient is doing well. No concerns are reported. No difficulty or problems with wound care. Wound care is being performed by staff/pt and consists of dressing changes and offloading wound(s). No complaints of wound pain. There have been no changes in patient's medical history in the interim. ROS:  No fever or chills. No rash. No pain at site of wound    Objective:   General: NAD  Psych: Cooperative, no anxiety or depression  Neuro: Alert, oriented to person/place/situation. Otherwise nonfocal.  Extremities: Bilateral mild pitting edema is noted. Skin color is normal.   Vascular exam:  No gross changes in pedal pulses. Capillary refill is intact, <3sec. Dermatologic:  Skin color appears normal for patient. Skin turgor is normal. Dystrophic nails are seen on the feet bilaterally. Ulcer Description:   Measurement: in cm pre/post debridement:    0.5 x 0.3 x 0.2 / same inc depth to 0.3  Ulcer bed: Mixed Granular/Fibrotic    Periwound: Edematous, mildtender  Exudate: Moderate amount Serosanguinous exudate  Odor:  -      Assessment:  L lower leg anterior hematoma due to traumatic injury   Unspecified open wound left lower leg S81.802D 2* to above hematoma (no longer draining any bloody fluid)  Converting to venous stasis wound L leg i83.022  Venous insufficiency i87.2 - single tubi for compression, elevation - discussed need to control edema      Plan:      Dressing: Promogram Frequency: every other day        Plan is reviewed with patient who expresses understanding. Questions were answered. Patient is to follow up with me in 1 wk. Yandel Baxter DPM        Ulcer assessment: Due to presence of necrotic tissue within the wound bed, ulcer requires debridement. Procedure: Debridement:   The indication for debridement was reviewed with patient.  Risks of procedure (bleeding, infection, pain) were discussed with patient and consent signed. Questions were answered    Subcutaneous excisional debridement   Indication: to remove necrotic tissue/ devitalized tissue/ soft eschar/ infected tissue/obtain deep wound culture through subcutaneous layer of wound bed  Consent in chart   Anesthesia: Topical 2% lidocaine jelly  Instrument: 15 Blade,    Residual Necrosis: Present and scored   Bleeding: <1ml   Hemostasis: Pressure   Patient tolerated procedure well   Procedural Pain: none  Post - procedural pain: none    Post debridement measurements: see progress note.   Surface area debrided: <20 sq. cm

## 2017-09-19 ENCOUNTER — HOSPITAL ENCOUNTER (OUTPATIENT)
Dept: WOUND CARE | Age: 72
Discharge: HOME | End: 2017-09-19
Payer: MEDICARE

## 2017-09-19 PROCEDURE — 11042 DBRDMT SUBQ TIS 1ST 20SQCM/<: CPT

## 2017-09-19 RX ORDER — LIDOCAINE HYDROCHLORIDE 20 MG/ML
JELLY TOPICAL AS NEEDED
Status: DISCONTINUED | OUTPATIENT
Start: 2017-09-19 | End: 2017-09-23 | Stop reason: HOSPADM

## 2017-09-19 RX ADMIN — LIDOCAINE HYDROCHLORIDE 5 ML: 20 JELLY TOPICAL at 10:41

## 2017-09-19 NOTE — PROGRESS NOTES
Wound Center  Progress Note    Subjective:   Patient is for follow up of LE ulcer(s). Patient is doing well. No concerns are reported. No difficulty or problems with wound care. Wound care is being performed by staff/pt and consists of dressing changes and offloading wound(s). No complaints of wound pain. There have been no changes in patient's medical history in the interim. ROS:  No fever or chills. No rash. No pain at site of wound    Objective:   General: NAD  Psych: Cooperative, no anxiety or depression  Neuro: Alert, oriented to person/place/situation. Otherwise nonfocal.  Extremities: Bilateral moderate pitting edema is noted. Skin color is normal.   Vascular exam:  No gross changes in pedal pulses. Capillary refill is intact, <3sec. Dermatologic:  Skin color appears normal for patient. Skin turgor is normal. Dystrophic nails are seen on the feet bilaterally. Ulcer Description:   Measurement: in cm pre/post debridement:    0.2 x 0.2 x 0.1 / same inc depth to 0.2  Ulcer bed: Mixed Granular/Fibrotic    Periwound: Edematous, mildtender  Exudate: Moderate amount Serosanguinous exudate  Odor:  -      Assessment:  L lower leg anterior hematoma due to traumatic injury   Unspecified open wound left lower leg S81.802D 2* to above hematoma (no longer draining any bloody fluid)  Converting to venous stasis wound L leg i83.022  Venous insufficiency i87.2 - single tubi for compression, elevation - discussed need to control edema      Plan:      Dressing: Promogram Frequency: every other day  - keep the same      Plan is reviewed with patient who expresses understanding. Questions were answered. Patient is to follow up with me in 1 wk. Yandel Casanova DPM        Ulcer assessment: Due to presence of necrotic tissue within the wound bed, ulcer requires debridement. Procedure: Debridement:   The indication for debridement was reviewed with patient.  Risks of procedure (bleeding, infection, pain) were discussed with patient and consent signed. Questions were answered    Subcutaneous excisional debridement   Indication: to remove necrotic tissue/ devitalized tissue/ soft eschar/ infected tissue/obtain deep wound culture through subcutaneous layer of wound bed  Consent in chart   Anesthesia: Topical 2% lidocaine jelly  Instrument: 15 Blade,    Residual Necrosis: Present and scored   Bleeding: <1ml   Hemostasis: Pressure   Patient tolerated procedure well   Procedural Pain: none  Post - procedural pain: none    Post debridement measurements: see progress note.   Surface area debrided: <20 sq. cm

## 2017-09-20 RX ORDER — CARVEDILOL 12.5 MG/1
12.5 TABLET ORAL 2 TIMES DAILY WITH MEALS
Qty: 60 TAB | Refills: 0 | Status: SHIPPED | OUTPATIENT
Start: 2017-09-20 | End: 2017-11-18 | Stop reason: SDUPTHER

## 2017-09-26 ENCOUNTER — HOSPITAL ENCOUNTER (OUTPATIENT)
Dept: WOUND CARE | Age: 72
Discharge: HOME | End: 2017-09-26
Payer: MEDICARE

## 2017-09-26 PROCEDURE — 11042 DBRDMT SUBQ TIS 1ST 20SQCM/<: CPT

## 2017-09-26 RX ORDER — LIDOCAINE HYDROCHLORIDE 20 MG/ML
JELLY TOPICAL ONCE
Status: COMPLETED | OUTPATIENT
Start: 2017-09-26 | End: 2017-09-26

## 2017-09-26 RX ADMIN — LIDOCAINE HYDROCHLORIDE 5 ML: 20 JELLY TOPICAL at 11:02

## 2017-09-26 NOTE — PROGRESS NOTES
Wound Center  Progress Note    Subjective:   Patient is for follow up of LE ulcer(s). Patient is doing well. No concerns are reported. No difficulty or problems with wound care. Wound care is being performed by staff/pt and consists of dressing changes and offloading wound(s). No complaints of wound pain. There have been no changes in patient's medical history in the interim. ROS:  No fever or chills. No rash. No pain at site of wound    Objective:   General: NAD  Psych: Cooperative, no anxiety or depression  Neuro: Alert, oriented to person/place/situation. Otherwise nonfocal.  Extremities: Bilateral moderate pitting edema is noted. Skin color is normal.   Vascular exam:  No gross changes in pedal pulses. Capillary refill is intact, <3sec. Dermatologic:  Skin color appears normal for patient. Skin turgor is normal. Dystrophic nails are seen on the feet bilaterally. Ulcer Description:   Measurement: in cm pre/post debridement:  0.1 x 0.1 x 0.1 / same inc depth to 0.3  Ulcer bed: Granular/Healthy    Periwound: Normal, nontender  Exudate: Moderate amount Serous exudate  Odor:  -        Assessment:  L lower leg anterior hematoma due to traumatic injury   Unspecified open wound left lower leg S81.802D 2* to above hematoma (no longer draining any bloody fluid)  Converting to venous stasis wound L leg i83.022  Venous insufficiency i87.2 - single tubi for compression, elevation - discussed need to control edema      Plan:      Dressing: Promogram Frequency: every other day  - keep the same         Plan is reviewed with patient who expresses understanding. Questions were answered. Patient is to follow up with me in 1 wk. Yandel Gold DPM        Ulcer assessment: Due to presence of necrotic tissue within the wound bed, ulcer requires debridement. Procedure: Debridement:   The indication for debridement was reviewed with patient.  Risks of procedure (bleeding, infection, pain) were discussed with patient and consent signed. Questions were answered    Subcutaneous excisional debridement   Indication: to remove necrotic tissue/ devitalized tissue/ soft eschar/ infected tissue/obtain deep wound culture through subcutaneous layer of wound bed  Consent in chart   Anesthesia: Topical 2% lidocaine jelly  Instrument: 15 Blade,    Residual Necrosis: Present and scored   Bleeding: <1ml   Hemostasis: Pressure   Patient tolerated procedure well   Procedural Pain: none  Post - procedural pain: none    Post debridement measurements: see progress note.   Surface area debrided: <20 sq. cm

## 2017-10-03 ENCOUNTER — HOSPITAL ENCOUNTER (OUTPATIENT)
Dept: WOUND CARE | Age: 72
Discharge: HOME | End: 2017-10-03
Payer: MEDICARE

## 2017-10-03 PROCEDURE — 97597 DBRDMT OPN WND 1ST 20 CM/<: CPT

## 2017-10-03 NOTE — PROGRESS NOTES
Wound Center  Progress Note    Subjective:   Patient is for follow up of LE ulcer(s). Patient is doing well. No concerns are reported. No difficulty or problems with wound care. Wound care is being performed by staff/pt and consists of dressing changes and offloading wound(s). No complaints of wound pain. There have been no changes in patient's medical history in the interim. ROS:  No fever or chills. No rash. No pain at site of wound    Objective:   General: NAD  Psych: Cooperative, no anxiety or depression  Neuro: Alert, oriented to person/place/situation. Otherwise nonfocal.  Extremities: Bilateral moderate pitting edema is noted. Skin color is normal.   Vascular exam:  No gross changes in pedal pulses. Capillary refill is intact, <3sec. Dermatologic:  Skin color appears normal for patient. Skin turgor is normal. Dystrophic nails are seen on the feet bilaterally. Ulcer Description:   Measurement: in cm pre/post debridement:  0.1 x 0.1 x 0.1 / same inc depth to 0.2  Ulcer bed: Granular/Healthy    Periwound: Normal, nontender  Exudate: Moderate amount Serous exudate  Odor:  -         Assessment:  L lower leg anterior hematoma due to traumatic injury   Unspecified open wound left lower leg S81.802D 2* to above hematoma (no longer draining any bloody fluid)  Converting to venous stasis wound L leg i83.022  Venous insufficiency i87.2 - single tubi for compression, elevation - discussed need to control edema      Plan:      Dressing: Promogram Frequency: every other day  - keep the same      Plan is reviewed with patient who expresses understanding. Questions were answered. Patient is to follow up with me in 1 wk. Yandel Silva, DPM        Ulcer assessment: Due to presence of hypertrophic and or macerated tissue within the wound bed or wound margin, ulcer requires debridement. Procedure: Debridement:   The indication for debridement was reviewed with patient.  Risks of procedure (bleeding, infection, pain) were discussed with patient and consent signed. Questions were answered    Wound selective debridement    Anesthesia: Topical 2% lidocaine jelly used as needed  Instrument: 15 Blade,     Bleeding: <1ml   Hemostasis: Pressure   Patient tolerated procedure well   Procedural Pain: none  Post - procedural pain: none    Pre / Post debridement measurements: see progress note.   Surface area debrided: <20 sq. cm

## 2017-10-17 ENCOUNTER — HOSPITAL ENCOUNTER (OUTPATIENT)
Dept: WOUND CARE | Age: 72
Discharge: HOME | End: 2017-10-17
Payer: MEDICARE

## 2017-10-17 PROCEDURE — 97597 DBRDMT OPN WND 1ST 20 CM/<: CPT

## 2017-10-17 NOTE — PROGRESS NOTES

## 2017-10-31 ENCOUNTER — HOSPITAL ENCOUNTER (OUTPATIENT)
Dept: WOUND CARE | Age: 72
Discharge: HOME | End: 2017-10-31
Payer: MEDICARE

## 2017-10-31 PROCEDURE — 99213 OFFICE O/P EST LOW 20 MIN: CPT

## 2017-10-31 NOTE — PROGRESS NOTES
Wound Center  Progress Note    Subjective:   Patient is for follow up of LE ulcer(s). Patient is doing well. No concerns are reported. No difficulty or problems with wound care. Wound care is being performed by staff/pt and consists of dressing changes and offloading wound(s). No complaints of wound pain. There have been no changes in patient's medical history in the interim. ROS:  No fever or chills. No rash. No pain at site of wound    Objective:   General: NAD  Psych: Cooperative, no anxiety or depression  Neuro: Alert, oriented to person/place/situation. Otherwise nonfocal.  Extremities: Bilateral mild pitting edema is noted. Skin color is normal.   Vascular exam:  No gross changes in pedal pulses. Capillary refill is intact, <3sec. Dermatologic:  Skin color appears normal for patient. Skin turgor is normal. Dystrophic nails are seen on the feet bilaterally. Ulcer Description:   Measurement: in cm pre/post debridement:  epithelialized  Ulcer bed: Epithialization    Periwound: Normal, nontender  Exudate: None: wound tissue dry  Odor:  -        Assessment:  L lower leg anterior hematoma due to traumatic injury   Unspecified open wound left lower leg S81.802D 2* to above hematoma (no longer draining any bloody fluid)  Converting to venous stasis wound L leg i83.022  Venous insufficiency i87.2 - single tubi for compression, elevation - discussed need to control edema      Plan:      Discharging from [facility]. PT educ. To wear compression socks. Gave card for referral to office. Plan is reviewed with patient who expresses understanding. Questions were answered. Patient is to follow up with me in 1 wk. Yandel Malone

## 2017-11-20 RX ORDER — CARVEDILOL 12.5 MG/1
TABLET ORAL
Qty: 60 TAB | Refills: 0 | Status: SHIPPED | OUTPATIENT
Start: 2017-11-20 | End: 2017-12-25 | Stop reason: SDUPTHER

## 2017-12-27 NOTE — TELEPHONE ENCOUNTER
REFILL IS PER VERBAL ORDER OF DR. NICKERSON    Requested Prescriptions     Pending Prescriptions Disp Refills    carvedilol (COREG) 12.5 mg tablet [Pharmacy Med Name: CARVEDILOL 12.5MG TABLETS] 60 Tab 0     Sig: TAKE 1 TABLET BY MOUTH TWICE DAILY WITH MEALS

## 2017-12-28 RX ORDER — CARVEDILOL 12.5 MG/1
TABLET ORAL
Qty: 60 TAB | Refills: 0 | Status: SHIPPED | OUTPATIENT
Start: 2017-12-28 | End: 2018-01-21 | Stop reason: SDUPTHER

## 2018-01-22 RX ORDER — CARVEDILOL 12.5 MG/1
TABLET ORAL
Qty: 60 TAB | Refills: 0 | Status: SHIPPED | OUTPATIENT
Start: 2018-01-22 | End: 2018-06-27

## 2018-01-22 NOTE — TELEPHONE ENCOUNTER
Refill is per verbal order of Dr. Beth Espinal.     Requested Prescriptions     Pending Prescriptions Disp Refills    carvedilol (COREG) 12.5 mg tablet [Pharmacy Med Name: CARVEDILOL 12.5MG TABLETS] 60 Tab 0     Sig: TAKE 1 TABLET BY MOUTH TWICE DAILY WITH MEALS

## 2018-03-16 DIAGNOSIS — I10 ESSENTIAL HYPERTENSION WITH GOAL BLOOD PRESSURE LESS THAN 130/85: ICD-10-CM

## 2018-03-16 RX ORDER — LISINOPRIL 5 MG/1
TABLET ORAL
Qty: 90 TAB | Refills: 3 | Status: ON HOLD | OUTPATIENT
Start: 2018-03-16 | End: 2018-11-30 | Stop reason: SDUPTHER

## 2018-03-30 ENCOUNTER — OFFICE VISIT (OUTPATIENT)
Dept: CARDIOLOGY CLINIC | Age: 73
End: 2018-03-30

## 2018-03-30 VITALS
BODY MASS INDEX: 39.24 KG/M2 | DIASTOLIC BLOOD PRESSURE: 70 MMHG | OXYGEN SATURATION: 95 % | SYSTOLIC BLOOD PRESSURE: 120 MMHG | WEIGHT: 250 LBS | HEIGHT: 67 IN | RESPIRATION RATE: 22 BRPM | HEART RATE: 100 BPM

## 2018-03-30 DIAGNOSIS — I10 ESSENTIAL HYPERTENSION: ICD-10-CM

## 2018-03-30 DIAGNOSIS — I82.409 RECURRENT DEEP VEIN THROMBOSIS (DVT) (HCC): ICD-10-CM

## 2018-03-30 DIAGNOSIS — I25.10 CORONARY ARTERY DISEASE INVOLVING NATIVE CORONARY ARTERY OF NATIVE HEART WITHOUT ANGINA PECTORIS: Primary | ICD-10-CM

## 2018-03-30 DIAGNOSIS — E78.5 DYSLIPIDEMIA: ICD-10-CM

## 2018-03-30 DIAGNOSIS — Z79.01 CHRONIC ANTICOAGULATION: ICD-10-CM

## 2018-03-30 NOTE — PROGRESS NOTES
Felix Lieberman MD    [id number] [place], [address] [facility]    Office [phone],XXE [phone]  Pager [phone]    Ras Johnston is a 81 y.o. female is here for f/u visit  CAD    Primary care physician:  Araceli Arriola MD    Patient Active Problem List   Diagnosis Code    Type II diabetes mellitus ([facility] E11.9    NSTEMI (non-ST elevated myocardial infarction) ([facility] [address] [facility] I21.4    Coronary artery disease involving native coronary artery without angina pectoris I25.10    Essential hypertension I10       Chief complaint:  Chief Complaint   Patient presents with    Coronary Artery Disease     multiple hopsital visits JW     Shortness of Breath     increase    Chest Pain       Assessment:  CAD s/p PCI to RCA with BMS ( NSTEMI 10/2015)  HTN - low  HLD - intolerant to multiple statins sec to myalgia  DM - \" BS up and down\"/Not well controlled  History of right DVT-October 2016; Has had prior DVT 1998  Chronic anticoagulation ( not on ASA/Plavix)        Plan:       Not on statin ( allergic/intolerant) -per patient her cholesterol is doing well. Has been intolerant to Lipitor, Crestor, Zocor-myalgias. She had her lipids checked in February 2018 ( PCP)  and it was elevated. Will try PCSK 9 inhibitors. Aggressive cardiovascular risk factor modification. Follow-up in 6 months. Patient understands the plan. All questions were answered to the patient's satisfaction. Medication Side Effects and Warnings were discussed with patient: yes  Patient Labs were reviewed and or requested:  yes  Patient Past Records were reviewed and or requested: yes    I appreciate the opportunity to be involved in Ms. Sellers. See note below for details. Please do not hesitate to contact us with questions or concerns. Felix Lieberman MD    Cardiac Testing/ Procedures: A. Cardiac Cath/PCI: 10/9/15 L Main: Medl;Nml    LAD: Prox- Med; 50%;  Distal - small; D1 - Small to med - prox 60%    LCflex: Large; Tortuous; MLI; GUILLERMO - med - MLI    RCA: Med; Prox 80%; Distal 95% just before bifurcation into small PDA and PLB    LVEDP: 22    LVEF: 55%/ Mild basal inf hypokinesis    No significant gradient across aortic valve. PCI: JR4 guide/BMW  Pre dil with 2 by 12 balloon  BMS 2.25 by 22 deployed at high milena distally  BMS 2.5 by 18 deployed at high milena proximal lesion  Post dil with 2.5 by 15      B. ECHO/MEE: 10/11/15 - Left ventricle: Systolic function was vigorous. Ejection fraction was  estimated in the range of 65 % to 70 %. There were no regional wall motion  abnormalities. C.StressNuclear/Stress ECHO/Stress test:    D.Vascular:    E. EP:    F. Miscellaneous:    Subjective:  Carrol Flynn is a 81 y.o. female who returns for follow up  Visit. No CP/dyspnea. Patient had exploratory laparotomy in April 2017 at [facility] for intestinal obstruction. She is now on Xarelto and her aspirin/Plavix has been discontinued. History of recurrent DVT. No chest pain. H xof EDWARDS - chronic. However patient is not very active and uses a cane to walk. ROS:cc  (bold if positive, if negative)     edema         Medications before admission:    Current Outpatient Prescriptions   Medication Sig Dispense    rivaroxaban (XARELTO) 20 mg tab tablet Take  by mouth daily.  lisinopril (PRINIVIL, ZESTRIL) 5 mg tablet TAKE 1 TABLET BY MOUTH DAILY 90 Tab    carvedilol (COREG) 12.5 mg tablet TAKE 1 TABLET BY MOUTH TWICE DAILY WITH MEALS (Patient taking differently: tAKE 1/2 TAB DAILY) 60 Tab    biotin 10,000 mcg cap Take  by mouth daily.  bumetanide (BUMEX) 1 mg tablet Take  by mouth as needed.  saxagliptin (ONGLYZA) 5 mg tab tablet Take 5 mg by mouth daily.  omeprazole (PRILOSEC) 20 mg capsule Take 20 mg by mouth daily.  DULoxetine (CYMBALTA) 60 mg capsule Take 60 mg by mouth daily.  cholecalciferol (VITAMIN D3) 1,000 unit tablet Take 2,000 Units by mouth daily.  azelastine-fluticasone (DYMISTA) 137-50 mcg/spray spry 137 mcg by Nasal route two (2) times a day.  mometasone-formoterol (DULERA) 200-5 mcg/actuation HFA inhaler Take 2 Puffs by inhalation two (2) times a day.  predniSONE (DELTASONE) 10 mg tablet Take 10 mg by mouth daily (with dinner).  B.infantis-B.ani-B.long-B.bifi (PROBIOTIC 4X) 10-15 mg TbEC Take 1 Tab by mouth daily. No current facility-administered medications for this visit. Physical Exam:  Visit Vitals    /70 (BP 1 Location: Left arm, BP Patient Position: Sitting)    Pulse 100    Resp 22    Ht 5' 7\" (1.702 m)    Wt 250 lb (113.4 kg)    SpO2 95%    BMI 39.16 kg/m2          Gen: Well-developed, well-nourished, in no acute distress, Obese  Neck: Supple,No JVD, No Carotid Bruit,   Resp: No accessory muscle use, no rales/rhonchi  Card: Regular Rate,Rythm,Normal S1, S2, No murmurs, rubs or gallop. No thrills.    Abd:  Soft, non-tender, non-distended,BS+,   MSK: No cyanosis  Skin: No rashes    Neuro: moving all four extremities , follows commands appropriately  Psych:  Good insight, oriented to person, place , alert, Nml Affect  LE: Trace edema    EKG: SR/Leftward axis/NSTT      LABS:        Lab Results   Component Value Date/Time    WBC 18.7 (H) 04/16/2017 12:02 PM    HGB 13.1 04/16/2017 12:02 PM    HCT 40.3 04/16/2017 12:02 PM    PLATELET 305 97/79/5305 12:02 PM    MCV 79.3 (L) 04/16/2017 12:02 PM     Lab Results   Component Value Date/Time    Sodium 135 (L) 04/16/2017 12:02 PM    Potassium 4.2 04/16/2017 12:02 PM    Chloride 98 04/16/2017 12:02 PM    CO2 24 04/16/2017 12:02 PM    Anion gap 13 04/16/2017 12:02 PM    Glucose 188 (H) 04/16/2017 12:02 PM    BUN 13 04/16/2017 12:02 PM    Creatinine 1.05 (H) 04/16/2017 12:02 PM    BUN/Creatinine ratio 12 04/16/2017 12:02 PM    GFR est AA >60 04/16/2017 12:02 PM    GFR est non-AA 52 (L) 04/16/2017 12:02 PM    Calcium 8.7 04/16/2017 12:02 PM       No results found for: APTT  No results found for: INR, PTMR, PTP, PT1, PT2  No components found for: Wander Villanueva MD

## 2018-03-30 NOTE — MR AVS SNAPSHOT
[address] [facility] 600 1007 [facility] 
[phone] Patient: Enoc Bridges MRN: [id number] Visit Information Date & Time Provider Department Dept. Phone Encounter #  
 3/30/2018  2:00 PM Tito Fontanez MD [facility] [phone] [id number] Your Appointments 10/1/2018  1:20 PM  
ESTABLISHED PATIENT with Tito Fontanez MD  
[facility] ([address]) Appt Note: 6 mo fup  
 [address] [facility] 600 1007 [facility]  
[address] [facility] [address] [address] Upcoming Health Maintenance Date Due Hepatitis C Screening 1945 FOOT EXAM Q1 1957 MICROALBUMIN Q1 1957 EYE EXAM RETINAL OR DILATED Q1 1957 DTaP/Tdap/Td series (1 - Tdap) 1968 BREAST CANCER SCRN MAMMOGRAM 1997 FOBT Q 1 YEAR AGE 50-75 1997 ZOSTER VACCINE AGE 60> 10/23/2007 GLAUCOMA SCREENING Q2Y 2012 Bone Densitometry (Dexa) Screening 2012 Pneumococcal 65+ Low/Medium Risk (1 of 2 - PCV13) 2012 HEMOGLOBIN A1C Q6M 4/10/2016 LIPID PANEL Q1 10/10/2016 Influenza Age 5 to Adult 2017 MEDICARE YEARLY EXAM 3/14/2018 Allergies as of 3/30/2018  Review Complete On: 3/30/2018 By: Malini Gunn LPN Severity Noted Reaction Type Reactions Advair Diskus [Fluticasone-salmeterol]  10/09/2015    Rash Ciprofloxacin  10/09/2015    Rash Statins-hmg-coa Reductase Inhibitors  10/09/2015    Other (comments) Muscle pain  
 Sulfa (Sulfonamide Antibiotics)  10/09/2015    Rash Tetracycline  10/09/2015    Other (comments) musclepain Current Immunizations  Never Reviewed Name Date Influenza Vaccine (Madin [place] Canine Kidney) PF 10/10/2015 10:12 AM  
  
 Not reviewed this visit You Were Diagnosed With   
  
 Codes Comments Coronary artery disease involving native coronary artery of native heart without angina pectoris    -  Primary ICD-10-CM: I25.10 ICD-9-CM: 414.01 Essential hypertension     ICD-10-CM: I10 
ICD-9-CM: 401.9 Dyslipidemia     ICD-10-CM: E78.5 ICD-9-CM: 272.4 Recurrent deep vein thrombosis (DVT) (HCC)     ICD-10-CM: I82.409 ICD-9-CM: 453.40 Chronic anticoagulation     ICD-10-CM: Z79.01 
ICD-9-CM: V58.61 Vitals BP Pulse Resp Height(growth percentile) Weight(growth percentile) SpO2  
 120/70 (BP 1 Location: Left arm, BP Patient Position: Sitting) 100 22 5' 7\" (1.702 m) 250 lb (113.4 kg) 95% BMI OB Status Smoking Status 39.16 kg/m2 Hysterectomy Former Smoker Vitals History BMI and BSA Data Body Mass Index Body Surface Area  
 39.16 kg/m 2 2.32 m 2 Preferred Pharmacy Pharmacy Name Phone [address] [place] [address] [address] [place] [address] Your Updated Medication List  
  
   
This list is accurate as of 3/30/18  2:46 PM.  Always use your most recent med list.  
  
  
  
  
 biotin 10,000 mcg Cap Take  by mouth daily. bumetanide 1 mg tablet Commonly known as:  Willean Celestino Take  by mouth as needed. carvedilol 12.5 mg tablet Commonly known as:  COREG  
TAKE 1 TABLET BY MOUTH TWICE DAILY WITH MEALS  
  
 DULERA 200-5 mcg/actuation HFA inhaler Generic drug:  mometasone-formoterol Take 2 Puffs by inhalation two (2) times a day. DULoxetine 60 mg capsule Commonly known as:  CYMBALTA Take 60 mg by mouth daily. DYMISTA 137-50 mcg/spray [place] Generic drug:  azelastine-fluticasone 137 mcg by Nasal route two (2) times a day. lisinopril 5 mg tablet Commonly known as:  PRINIVIL, ZESTRIL  
TAKE 1 TABLET BY MOUTH DAILY ONGLYZA 5 mg Tab tablet Generic drug:  sAXagliptin Take 5 mg by mouth daily. predniSONE 10 mg tablet Commonly known as:  Valma Belts Take 10 mg by mouth daily (with dinner). PriLOSEC 20 mg capsule Generic drug:  omeprazole Take 20 mg by mouth daily. PROBIOTIC 4X 10-15 mg Tbec Generic drug:  B.infantis-B.ani-B.long-B.bifi Take 1 Tab by mouth daily. VITAMIN D3 1,000 unit tablet Generic drug:  cholecalciferol Take 2,000 Units by mouth daily. XARELTO 20 mg Tab tablet Generic drug:  rivaroxaban Take  by mouth daily. Introducing [facility]! Dear Radha Mitchell: Thank you for requesting a [email or web address] account. Our records indicate that you already have an active [email or web address] account. You can access your account anytime at [email or web address] Did you know that you can access your hospital and ER discharge instructions at any time in [email or web address]? You can also review all of your test results from your hospital stay or ER visit. Additional Information If you have questions, please visit the Frequently Asked Questions section of the [email or web address] website at [email or web address] Remember, [email or web address] is NOT to be used for urgent needs. For medical emergencies, dial 911. Now available from your iPhone and Android! Please provide this summary of care documentation to your next provider. Your primary care clinician is listed as Albino Net. If you have any questions after today's visit, please call [phone].

## 2018-04-13 ENCOUNTER — DOCUMENTATION ONLY (OUTPATIENT)
Dept: CARDIOLOGY CLINIC | Age: 73
End: 2018-04-13

## 2018-04-16 ENCOUNTER — DOCUMENTATION ONLY (OUTPATIENT)
Dept: CARDIOLOGY CLINIC | Age: 73
End: 2018-04-16

## 2018-04-16 NOTE — PROGRESS NOTES
Pt unable to afford Repatha even with Insurance authoirzation. Faxed info to [employer] program to see if pt qualifies.

## 2018-06-27 ENCOUNTER — APPOINTMENT (OUTPATIENT)
Dept: GENERAL RADIOLOGY | Age: 73
DRG: 313 | End: 2018-06-27
Attending: EMERGENCY MEDICINE
Payer: MEDICARE

## 2018-06-27 ENCOUNTER — HOSPITAL ENCOUNTER (INPATIENT)
Age: 73
LOS: 4 days | Discharge: HOME HEALTH | DRG: 313 | End: 2018-07-01
Attending: EMERGENCY MEDICINE | Admitting: INTERNAL MEDICINE
Payer: MEDICARE

## 2018-06-27 ENCOUNTER — APPOINTMENT (OUTPATIENT)
Dept: CT IMAGING | Age: 73
DRG: 313 | End: 2018-06-27
Attending: INTERNAL MEDICINE
Payer: MEDICARE

## 2018-06-27 DIAGNOSIS — R06.82 TACHYPNEA: ICD-10-CM

## 2018-06-27 DIAGNOSIS — R00.0 TACHYCARDIA: Primary | ICD-10-CM

## 2018-06-27 DIAGNOSIS — R06.02 SOB (SHORTNESS OF BREATH): ICD-10-CM

## 2018-06-27 DIAGNOSIS — R10.13 ABDOMINAL PAIN, EPIGASTRIC: ICD-10-CM

## 2018-06-27 PROBLEM — R10.84 GENERALIZED ABDOMINAL PAIN: Status: ACTIVE | Noted: 2018-06-27

## 2018-06-27 PROBLEM — R07.9 CHEST PAIN: Status: ACTIVE | Noted: 2018-06-27

## 2018-06-27 LAB
ALBUMIN SERPL-MCNC: 2.9 G/DL (ref 3.5–5)
ALBUMIN/GLOB SERPL: 0.8 {RATIO} (ref 1.1–2.2)
ALP SERPL-CCNC: 156 U/L (ref 45–117)
ALT SERPL-CCNC: 68 U/L (ref 12–78)
ANION GAP BLD CALC-SCNC: 17 MMOL/L (ref 10–20)
ANION GAP SERPL CALC-SCNC: 13 MMOL/L (ref 5–15)
AST SERPL-CCNC: 36 U/L (ref 15–37)
ATRIAL RATE: 138 BPM
BASOPHILS # BLD: 0.1 K/UL (ref 0–0.1)
BASOPHILS NFR BLD: 0 % (ref 0–1)
BILIRUB SERPL-MCNC: 0.8 MG/DL (ref 0.2–1)
BNP SERPL-MCNC: 1198 PG/ML (ref 0–125)
BUN BLD-MCNC: 23 MG/DL (ref 9–20)
BUN SERPL-MCNC: 25 MG/DL (ref 6–20)
BUN/CREAT SERPL: 18 (ref 12–20)
CA-I BLD-MCNC: 1.11 MMOL/L (ref 1.12–1.32)
CALCIUM SERPL-MCNC: 9.1 MG/DL (ref 8.5–10.1)
CALCULATED P AXIS, ECG09: 53 DEGREES
CALCULATED R AXIS, ECG10: -87 DEGREES
CALCULATED T AXIS, ECG11: 42 DEGREES
CHLORIDE BLD-SCNC: 104 MMOL/L (ref 98–107)
CHLORIDE SERPL-SCNC: 103 MMOL/L (ref 97–108)
CO2 BLD-SCNC: 21 MMOL/L (ref 21–32)
CO2 SERPL-SCNC: 22 MMOL/L (ref 21–32)
CREAT BLD-MCNC: 1.1 MG/DL (ref 0.6–1.3)
CREAT SERPL-MCNC: 1.41 MG/DL (ref 0.55–1.02)
DIAGNOSIS, 93000: NORMAL
DIFFERENTIAL METHOD BLD: ABNORMAL
EOSINOPHIL # BLD: 0.1 K/UL (ref 0–0.4)
EOSINOPHIL NFR BLD: 1 % (ref 0–7)
ERYTHROCYTE [DISTWIDTH] IN BLOOD BY AUTOMATED COUNT: 17.1 % (ref 11.5–14.5)
EST. AVERAGE GLUCOSE BLD GHB EST-MCNC: 137 MG/DL
GLOBULIN SER CALC-MCNC: 3.8 G/DL (ref 2–4)
GLUCOSE BLD STRIP.AUTO-MCNC: 139 MG/DL (ref 65–100)
GLUCOSE BLD-MCNC: 155 MG/DL (ref 65–100)
GLUCOSE SERPL-MCNC: 172 MG/DL (ref 65–100)
HBA1C MFR BLD: 6.4 % (ref 4.2–6.3)
HCT VFR BLD AUTO: 36.5 % (ref 35–47)
HCT VFR BLD CALC: 31 % (ref 35–47)
HGB BLD-MCNC: 11.1 G/DL (ref 11.5–16)
IMM GRANULOCYTES # BLD: 0.1 K/UL (ref 0–0.04)
IMM GRANULOCYTES NFR BLD AUTO: 1 % (ref 0–0.5)
LIPASE SERPL-CCNC: 334 U/L (ref 73–393)
LYMPHOCYTES # BLD: 1.5 K/UL (ref 0.8–3.5)
LYMPHOCYTES NFR BLD: 8 % (ref 12–49)
MCH RBC QN AUTO: 27.4 PG (ref 26–34)
MCHC RBC AUTO-ENTMCNC: 30.4 G/DL (ref 30–36.5)
MCV RBC AUTO: 90.1 FL (ref 80–99)
MONOCYTES # BLD: 0.9 K/UL (ref 0–1)
MONOCYTES NFR BLD: 5 % (ref 5–13)
NEUTS SEG # BLD: 15.1 K/UL (ref 1.8–8)
NEUTS SEG NFR BLD: 85 % (ref 32–75)
NRBC # BLD: 0 K/UL (ref 0–0.01)
NRBC BLD-RTO: 0 PER 100 WBC
P-R INTERVAL, ECG05: 122 MS
PLATELET # BLD AUTO: 283 K/UL (ref 150–400)
PMV BLD AUTO: 9.5 FL (ref 8.9–12.9)
POTASSIUM BLD-SCNC: 3.9 MMOL/L (ref 3.5–5.1)
POTASSIUM SERPL-SCNC: 4.3 MMOL/L (ref 3.5–5.1)
PROT SERPL-MCNC: 6.7 G/DL (ref 6.4–8.2)
Q-T INTERVAL, ECG07: 292 MS
QRS DURATION, ECG06: 72 MS
QTC CALCULATION (BEZET), ECG08: 442 MS
RBC # BLD AUTO: 4.05 M/UL (ref 3.8–5.2)
SERVICE CMNT-IMP: ABNORMAL
SERVICE CMNT-IMP: ABNORMAL
SODIUM BLD-SCNC: 137 MMOL/L (ref 136–145)
SODIUM SERPL-SCNC: 138 MMOL/L (ref 136–145)
TROPONIN I SERPL-MCNC: <0.05 NG/ML
VENTRICULAR RATE, ECG03: 138 BPM
WBC # BLD AUTO: 17.7 K/UL (ref 3.6–11)

## 2018-06-27 PROCEDURE — 74011250636 HC RX REV CODE- 250/636: Performed by: INTERNAL MEDICINE

## 2018-06-27 PROCEDURE — 85025 COMPLETE CBC W/AUTO DIFF WBC: CPT | Performed by: EMERGENCY MEDICINE

## 2018-06-27 PROCEDURE — 74011000250 HC RX REV CODE- 250: Performed by: INTERNAL MEDICINE

## 2018-06-27 PROCEDURE — 84484 ASSAY OF TROPONIN QUANT: CPT | Performed by: EMERGENCY MEDICINE

## 2018-06-27 PROCEDURE — 82962 GLUCOSE BLOOD TEST: CPT

## 2018-06-27 PROCEDURE — 74011000250 HC RX REV CODE- 250: Performed by: EMERGENCY MEDICINE

## 2018-06-27 PROCEDURE — 93005 ELECTROCARDIOGRAM TRACING: CPT

## 2018-06-27 PROCEDURE — 94640 AIRWAY INHALATION TREATMENT: CPT

## 2018-06-27 PROCEDURE — 65660000000 HC RM CCU STEPDOWN

## 2018-06-27 PROCEDURE — 36415 COLL VENOUS BLD VENIPUNCTURE: CPT | Performed by: EMERGENCY MEDICINE

## 2018-06-27 PROCEDURE — 74011250637 HC RX REV CODE- 250/637: Performed by: INTERNAL MEDICINE

## 2018-06-27 PROCEDURE — 83036 HEMOGLOBIN GLYCOSYLATED A1C: CPT | Performed by: INTERNAL MEDICINE

## 2018-06-27 PROCEDURE — 83880 ASSAY OF NATRIURETIC PEPTIDE: CPT | Performed by: EMERGENCY MEDICINE

## 2018-06-27 PROCEDURE — 74011250636 HC RX REV CODE- 250/636: Performed by: EMERGENCY MEDICINE

## 2018-06-27 PROCEDURE — 71250 CT THORAX DX C-: CPT

## 2018-06-27 PROCEDURE — 80053 COMPREHEN METABOLIC PANEL: CPT | Performed by: EMERGENCY MEDICINE

## 2018-06-27 PROCEDURE — 74011250637 HC RX REV CODE- 250/637: Performed by: EMERGENCY MEDICINE

## 2018-06-27 PROCEDURE — 96361 HYDRATE IV INFUSION ADD-ON: CPT

## 2018-06-27 PROCEDURE — 74176 CT ABD & PELVIS W/O CONTRAST: CPT

## 2018-06-27 PROCEDURE — 83690 ASSAY OF LIPASE: CPT | Performed by: EMERGENCY MEDICINE

## 2018-06-27 PROCEDURE — 99285 EMERGENCY DEPT VISIT HI MDM: CPT

## 2018-06-27 PROCEDURE — 71045 X-RAY EXAM CHEST 1 VIEW: CPT

## 2018-06-27 PROCEDURE — 74011250636 HC RX REV CODE- 250/636: Performed by: FAMILY MEDICINE

## 2018-06-27 PROCEDURE — 80047 BASIC METABLC PNL IONIZED CA: CPT

## 2018-06-27 PROCEDURE — 96374 THER/PROPH/DIAG INJ IV PUSH: CPT

## 2018-06-27 RX ORDER — SODIUM CHLORIDE 0.9 % (FLUSH) 0.9 %
5-10 SYRINGE (ML) INJECTION EVERY 8 HOURS
Status: DISCONTINUED | OUTPATIENT
Start: 2018-06-27 | End: 2018-07-01 | Stop reason: HOSPADM

## 2018-06-27 RX ORDER — GUAIFENESIN 100 MG/5ML
81 LIQUID (ML) ORAL DAILY
Status: DISCONTINUED | OUTPATIENT
Start: 2018-06-28 | End: 2018-07-01 | Stop reason: HOSPADM

## 2018-06-27 RX ORDER — CARVEDILOL 6.25 MG/1
6.25 TABLET ORAL 2 TIMES DAILY
COMMUNITY
End: 2018-11-13

## 2018-06-27 RX ORDER — ARFORMOTEROL TARTRATE 15 UG/2ML
15 SOLUTION RESPIRATORY (INHALATION)
Status: DISCONTINUED | OUTPATIENT
Start: 2018-06-27 | End: 2018-07-01 | Stop reason: HOSPADM

## 2018-06-27 RX ORDER — ONDANSETRON 2 MG/ML
4 INJECTION INTRAMUSCULAR; INTRAVENOUS
Status: DISCONTINUED | OUTPATIENT
Start: 2018-06-27 | End: 2018-06-27

## 2018-06-27 RX ORDER — PANTOPRAZOLE SODIUM 40 MG/1
40 TABLET, DELAYED RELEASE ORAL DAILY
COMMUNITY
End: 2018-11-13

## 2018-06-27 RX ORDER — PREDNISONE 20 MG/1
20 TABLET ORAL DAILY
Status: DISCONTINUED | OUTPATIENT
Start: 2018-06-28 | End: 2018-07-01 | Stop reason: HOSPADM

## 2018-06-27 RX ORDER — INSULIN LISPRO 100 [IU]/ML
INJECTION, SOLUTION INTRAVENOUS; SUBCUTANEOUS
Status: DISCONTINUED | OUTPATIENT
Start: 2018-06-27 | End: 2018-07-01 | Stop reason: HOSPADM

## 2018-06-27 RX ORDER — PROMETHAZINE HYDROCHLORIDE 25 MG/1
25 TABLET ORAL
COMMUNITY
End: 2018-12-03

## 2018-06-27 RX ORDER — SODIUM CHLORIDE 9 MG/ML
100 INJECTION, SOLUTION INTRAVENOUS CONTINUOUS
Status: DISCONTINUED | OUTPATIENT
Start: 2018-06-27 | End: 2018-06-28

## 2018-06-27 RX ORDER — GUAIFENESIN 100 MG/5ML
81 LIQUID (ML) ORAL DAILY
COMMUNITY
Start: 2018-06-07 | End: 2018-07-07

## 2018-06-27 RX ORDER — ACETAMINOPHEN 325 MG/1
650 TABLET ORAL
COMMUNITY
End: 2019-06-12

## 2018-06-27 RX ORDER — HYDROCODONE BITARTRATE AND ACETAMINOPHEN 5; 325 MG/1; MG/1
1 TABLET ORAL
COMMUNITY
End: 2018-12-03

## 2018-06-27 RX ORDER — ONDANSETRON 4 MG/1
4 TABLET, ORALLY DISINTEGRATING ORAL
Status: ON HOLD | COMMUNITY
End: 2018-10-19 | Stop reason: CLARIF

## 2018-06-27 RX ORDER — IPRATROPIUM BROMIDE AND ALBUTEROL SULFATE 2.5; .5 MG/3ML; MG/3ML
3 SOLUTION RESPIRATORY (INHALATION)
Status: DISCONTINUED | OUTPATIENT
Start: 2018-06-27 | End: 2018-07-01 | Stop reason: HOSPADM

## 2018-06-27 RX ORDER — LANOLIN ALCOHOL/MO/W.PET/CERES
400 CREAM (GRAM) TOPICAL 2 TIMES DAILY
Status: ON HOLD | COMMUNITY
End: 2018-10-19 | Stop reason: CLARIF

## 2018-06-27 RX ORDER — MAGNESIUM SULFATE 100 %
4 CRYSTALS MISCELLANEOUS AS NEEDED
Status: DISCONTINUED | OUTPATIENT
Start: 2018-06-27 | End: 2018-07-01 | Stop reason: HOSPADM

## 2018-06-27 RX ORDER — ASPIRIN 325 MG
325 TABLET ORAL
Status: COMPLETED | OUTPATIENT
Start: 2018-06-27 | End: 2018-06-27

## 2018-06-27 RX ORDER — DULOXETIN HYDROCHLORIDE 30 MG/1
60 CAPSULE, DELAYED RELEASE ORAL DAILY
Status: DISCONTINUED | OUTPATIENT
Start: 2018-06-28 | End: 2018-07-01 | Stop reason: HOSPADM

## 2018-06-27 RX ORDER — CARVEDILOL 6.25 MG/1
6.25 TABLET ORAL 2 TIMES DAILY
Status: DISCONTINUED | OUTPATIENT
Start: 2018-06-27 | End: 2018-07-01 | Stop reason: HOSPADM

## 2018-06-27 RX ORDER — CLOPIDOGREL BISULFATE 75 MG/1
75 TABLET ORAL DAILY
Status: DISCONTINUED | OUTPATIENT
Start: 2018-06-28 | End: 2018-07-01 | Stop reason: HOSPADM

## 2018-06-27 RX ORDER — CLOPIDOGREL BISULFATE 75 MG/1
75 TABLET ORAL EVERY EVENING
COMMUNITY
End: 2020-01-31

## 2018-06-27 RX ORDER — DEXTROSE 50 % IN WATER (D50W) INTRAVENOUS SYRINGE
12.5-25 AS NEEDED
Status: DISCONTINUED | OUTPATIENT
Start: 2018-06-27 | End: 2018-07-01 | Stop reason: HOSPADM

## 2018-06-27 RX ORDER — ONDANSETRON 2 MG/ML
4 INJECTION INTRAMUSCULAR; INTRAVENOUS
Status: DISCONTINUED | OUTPATIENT
Start: 2018-06-27 | End: 2018-07-01 | Stop reason: HOSPADM

## 2018-06-27 RX ORDER — SODIUM CHLORIDE 0.9 % (FLUSH) 0.9 %
5-10 SYRINGE (ML) INJECTION AS NEEDED
Status: DISCONTINUED | OUTPATIENT
Start: 2018-06-27 | End: 2018-07-01 | Stop reason: HOSPADM

## 2018-06-27 RX ORDER — LOPERAMIDE HCL 2 MG
2 TABLET ORAL 2 TIMES DAILY
COMMUNITY
End: 2019-01-05 | Stop reason: CLARIF

## 2018-06-27 RX ORDER — MORPHINE SULFATE 4 MG/ML
2 INJECTION INTRAVENOUS
Status: DISCONTINUED | OUTPATIENT
Start: 2018-06-27 | End: 2018-07-01 | Stop reason: HOSPADM

## 2018-06-27 RX ORDER — BUDESONIDE 0.5 MG/2ML
500 INHALANT ORAL
Status: DISCONTINUED | OUTPATIENT
Start: 2018-06-27 | End: 2018-07-01 | Stop reason: HOSPADM

## 2018-06-27 RX ORDER — ACETAMINOPHEN 325 MG/1
650 TABLET ORAL
Status: DISCONTINUED | OUTPATIENT
Start: 2018-06-27 | End: 2018-07-01 | Stop reason: HOSPADM

## 2018-06-27 RX ORDER — PANTOPRAZOLE SODIUM 40 MG/1
40 TABLET, DELAYED RELEASE ORAL DAILY
Status: DISCONTINUED | OUTPATIENT
Start: 2018-06-28 | End: 2018-07-01 | Stop reason: HOSPADM

## 2018-06-27 RX ORDER — POTASSIUM CHLORIDE 20 MEQ/1
20 TABLET, EXTENDED RELEASE ORAL
COMMUNITY
End: 2018-12-03

## 2018-06-27 RX ORDER — ENOXAPARIN SODIUM 100 MG/ML
100 INJECTION SUBCUTANEOUS EVERY 12 HOURS
Status: DISCONTINUED | OUTPATIENT
Start: 2018-06-27 | End: 2018-06-29

## 2018-06-27 RX ADMIN — ASPIRIN 325 MG: 325 TABLET ORAL at 11:18

## 2018-06-27 RX ADMIN — PROMETHAZINE HYDROCHLORIDE 12.5 MG: 25 INJECTION INTRAMUSCULAR; INTRAVENOUS at 11:19

## 2018-06-27 RX ADMIN — CARVEDILOL 6.25 MG: 6.25 TABLET, FILM COATED ORAL at 18:57

## 2018-06-27 RX ADMIN — Medication 10 ML: at 16:11

## 2018-06-27 RX ADMIN — ONDANSETRON 4 MG: 2 INJECTION, SOLUTION INTRAMUSCULAR; INTRAVENOUS at 23:58

## 2018-06-27 RX ADMIN — ARFORMOTEROL TARTRATE 15 MCG: 15 SOLUTION RESPIRATORY (INHALATION) at 20:50

## 2018-06-27 RX ADMIN — NITROGLYCERIN 0.5 INCH: 20 OINTMENT TOPICAL at 18:58

## 2018-06-27 RX ADMIN — ENOXAPARIN SODIUM 100 MG: 100 INJECTION SUBCUTANEOUS at 18:52

## 2018-06-27 RX ADMIN — SODIUM CHLORIDE 75 ML/HR: 900 INJECTION, SOLUTION INTRAVENOUS at 16:08

## 2018-06-27 RX ADMIN — ACETAMINOPHEN 650 MG: 325 TABLET ORAL at 16:11

## 2018-06-27 RX ADMIN — LIDOCAINE HYDROCHLORIDE 40 ML: 20 SOLUTION ORAL; TOPICAL at 11:19

## 2018-06-27 RX ADMIN — SODIUM CHLORIDE 1000 ML: 900 INJECTION, SOLUTION INTRAVENOUS at 11:20

## 2018-06-27 RX ADMIN — Medication 10 ML: at 23:58

## 2018-06-27 RX ADMIN — BUDESONIDE 500 MCG: 0.5 INHALANT RESPIRATORY (INHALATION) at 20:50

## 2018-06-27 NOTE — PROGRESS NOTES
[facility]: MED RECONCILIATION    Comments/Recommendations:    Medication history was obtained from transfer papers from [facility]. Pharmacy student called to confirm that the only medication given this morning was ondansetron at 0400. Medications added:     · Aspirin  · Clopidogrel  · Lactobacillus probiotic (Culturelle)  · Promethazine  · Loperamide  · Magnesium oxide  · Ondansetron  · Pantoprazole  · Potassium chloride  · Psyllium (Metamucil)  · Hydrocodone/APAP    Medications removed:    · B infantis probiotic  · Bumetanide  · Evolocumab  · Omeprazole    Medications adjusted:    · Azelastine/fluticasone (dysmista) nasal spray changed to prn allergies  · Decreased dose of carvedilol from 12.5 mg twice daily to 6.25 mg twice daily  · Decreased dose of vitamin D from 2000 units daily to 1000 units daily  · Decreased dose of Xarelto from 20 mg daily to 15 mg daily  · Increased dose of prednisone from 10 mg daily to 20 mg daily    Allergies: Advair diskus [fluticasone-salmeterol]; Ciprofloxacin; Statins-hmg-coa reductase inhibitors; Sulfa (sulfonamide antibiotics); and Tetracycline    Prior to Admission Medications:   Prior to Admission Medications   Prescriptions Last Dose Informant Patient Reported? Taking? DULoxetine (CYMBALTA) 60 mg capsule 2018 at 0900 Transfer Papers Yes Yes   Sig: Take 60 mg by mouth daily. HYDROcodone-acetaminophen (NORCO) 5-325 mg per tablet  Transfer Papers Yes Yes   Sig: Take 1 Tab by mouth every four (4) hours as needed for Pain. acetaminophen (TYLENOL) 325 mg tablet  Transfer Papers Yes Yes   Sig: Take 650 mg by mouth every six (6) hours as needed for Pain. aspirin 81 mg chewable tablet 2018 at 0900 Transfer Papers Yes Yes   Sig: Take 81 mg by mouth daily. For 1 month, started 18   azelastine-fluticasone (DYMISTA) 137-50 mcg/spray spry  Transfer Papers Yes Yes   Si Spray by Nasal route two (2) times daily as needed (allergies).    biotin 10,000 mcg cap 6/26/2018 at 0900 Transfer Papers Yes Yes   Sig: Take 1 Cap by mouth daily. carvedilol (COREG) 6.25 mg tablet 6/26/2018 at 1700 Transfer Papers Yes Yes   Sig: Take 6.25 mg by mouth two (2) times a day. cholecalciferol (VITAMIN D3) 1,000 unit tablet 6/26/2018 at 0900 Transfer Papers Yes Yes   Sig: Take 1,000 Units by mouth daily. clopidogrel (PLAVIX) 75 mg tab 6/26/2018 at 0900 Transfer Papers Yes Yes   Sig: Take 75 mg by mouth daily. lactobacillus rhamnosus gg 10 billion cell (CULTURELLE) 10 billion cell capsule 6/26/2018 at 0900 Transfer Papers Yes Yes   Sig: Take 1 Cap by mouth daily. lisinopril (PRINIVIL, ZESTRIL) 5 mg tablet 6/26/2018 at 0900 Transfer Papers No Yes   Sig: TAKE 1 TABLET BY MOUTH DAILY   loperamide (IMMODIUM) 2 mg tablet  Transfer Papers Yes Yes   Sig: Take 2-4 mg by mouth as needed for Diarrhea. Give 2 tablets by mouth after the 1st loose stool and 1 tablet every 8 hours as needed for diarrhea, not to exceed 16 mg in 24 hours   magnesium oxide (MAG-OX) 400 mg tablet 6/26/2018 at 1700 Transfer Papers Yes Yes   Sig: Take 400 mg by mouth two (2) times a day. mometasone-formoterol (DULERA) 200-5 mcg/actuation HFA inhaler 6/26/2018 at 2100 Transfer Papers Yes Yes   Sig: Take 2 Puffs by inhalation two (2) times a day. ondansetron (ZOFRAN ODT) 4 mg disintegrating tablet 6/27/2018 at 0400 Transfer Papers Yes Yes   Sig: Take 4 mg by mouth every eight (8) hours as needed for Nausea. pantoprazole (PROTONIX) 40 mg tablet 6/26/2018 at 0900 Transfer Papers Yes Yes   Sig: Take 40 mg by mouth daily. potassium chloride (K-DUR, KLOR-CON) 20 mEq tablet 6/26/2018 at 1630 Transfer Papers Yes Yes   Sig: Take 20 mEq by mouth daily (with dinner). predniSONE (DELTASONE) 10 mg tablet 6/26/2018 at 0900 Transfer Papers Yes Yes   Sig: Take 20 mg by mouth daily. promethazine (PHENERGAN) 25 mg tablet  Transfer Papers Yes Yes   Sig: Take 25 mg by mouth every six (6) hours as needed for Nausea.    psyllium (METAMUCIL) powd 6/26/2018 at 0900 Transfer Papers Yes Yes   Sig: Take  by mouth daily. 2 teaspoons   rivaroxaban (XARELTO) 15 mg tab tablet 6/26/2018 at 0900 Transfer Papers Yes Yes   Sig: Take 15 mg by mouth daily. saxagliptin (ONGLYZA) 5 mg tab tablet 6/26/2018 at 1630 Transfer Papers Yes Yes   Sig: Take 5 mg by mouth daily.  Takes at 36        Thank you,  Cheli Victoria, Student Pharmacist

## 2018-06-27 NOTE — ED PROVIDER NOTES
HPI Comments: 81 y.o. female with past medical history significant for DVT, lung collapse, HTN, CAD, NSTEMI, DM, asthma and GERD who presents via EMS with chief complaint of abd pain. Pt reports onset of epigastric pain and nausea at 0130, ~9 hours ago, that is currently accompanied by acute on chronic SOB. Pt reports since onset of sx she has been taking zofran, without relief. Pt explains she had a cardiac stent placed 3 weeks ago at [facility] after an NSTEMI, that presented similar to her current sx. Pt also notes recent BG of 167, which is high for her, a BP of 99/50, which is low for her and a HR >160. Pt reports she took a 81 mg ASA last night and also takes Xarelto and Plavix. Pt denies previous hx of CHF. There are no other acute medical concerns at this time. Social hx: former smoker (quit 3/30/2017)  PCP: Court Grimes MD    Note written by Sandra Vazquez, as dictated by Jos Montes MD 10:56 PM       The history is provided by the patient and a relative.         Past Medical History:   Diagnosis Date    Asthma     Autoimmune disease ([address]     fibromyalgia    CAD (coronary artery disease) 10/9/2015    Diabetes ([facility]     DVT (deep vein thrombosis) in pregnancy ([address]     GERD (gastroesophageal reflux disease)     HTN (hypertension)     NSTEMI (non-ST elevated myocardial infarction) ([facility] [address] 10/9/2015    Sleep apnea     wears CPAP       Past Surgical History:   Procedure Laterality Date    ABDOMEN SURGERY PROC UNLISTED      hital hernia repair, gall bladder removed    BREAST SURGERY PROCEDURE UNLISTED      lumpectomy    CARDIAC SURG PROCEDURE UNLIST  10/9/15    2 stents    HX  SECTION      HX COLOSTOMY      and reversal, post episiotomy repair    HX HYSTERECTOMY      HX UROLOGICAL  2009    bladder sling         Family History:   Problem Relation Age of Onset    Diabetes Mother     Heart Failure Mother     Kidney Disease Mother     Diabetes Father    Velta Ganser Heart Disease Father     Elevated Lipids Father     Heart Failure Father     Heart Disease Brother     Cancer Brother      kidney    Diabetes Brother     No Known Problems Brother     No Known Problems Brother        Social History     Social History    Marital status:      Spouse name: N/A    Number of children: N/A    Years of education: N/A     Occupational History    Not on file. Social History Main Topics    Smoking status: Former Smoker     Quit date: 3/30/2017    Smokeless tobacco: Never Used    Alcohol use 0.0 oz/week     0 Standard drinks or equivalent per week      Comment: very very rarely    Drug use: Not on file    Sexual activity: Not on file     Other Topics Concern    Not on file     Social History Narrative         ALLERGIES: Advair diskus [fluticasone-salmeterol]; Ciprofloxacin; Statins-hmg-coa reductase inhibitors; Sulfa (sulfonamide antibiotics); and Tetracycline    Review of Systems   Respiratory: Positive for shortness of breath. Gastrointestinal: Positive for abdominal pain. All other systems reviewed and are negative. Vitals:    06/27/18 1039   BP: 121/68   Pulse: (!) 142   Resp: 20   Temp: 98.5 °F (36.9 °C)   SpO2: 97%   Weight: 107.4 kg (236 lb 11.2 oz)            Physical Exam   Constitutional: She appears well-developed and well-nourished. No distress. HENT:   Head: Normocephalic and atraumatic. Eyes: Conjunctivae are normal.   Neck: Neck supple. Cardiovascular: Regular rhythm and normal heart sounds. Tachycardia present. Echo: hyperdynamic contractility, no pericardial effusion   Pulmonary/Chest: Effort normal and breath sounds normal. No stridor. No respiratory distress. Abdominal: She exhibits no distension. Musculoskeletal: Normal range of motion. Neurological: She is alert. Coordination normal.   Skin: Skin is warm and dry. Psychiatric: She has a normal mood and affect. Nursing note and vitals reviewed.      Note written by Sandra Hunter, as dictated by Macie Tellez MD 10:56 PM     MDM    81 y.o. female presents with feeling of intense nausea and shortness of breath starting this morning with epigastric pain. She appears unwell, had recent admission for NSTEMI with similar symptoms ar onset. Labs are concerning for elevated BNP of unclear etiology. Good systolic function and suspect with HR elevation that Pt is volume down overall. CR is s;lightly elevated from baseline. Pt is on xarelto but has tachycardia, shortness of breath and epigastric pain that is concerning for PE given strong proclivity for VTE historically and high risk features with recent hospitaliszation. Radiology unwilling to perform contrasted study d/t borderline GFR. Continues with tachypnea and tachycardia despite fluids, will recommend admission for observation and monitoring to complete workup for PE, atypical ACS, or other etiologies for her current symptoms. ED Course       Procedures    ED EKG interpretation:  Rhythm: sinus tachycardia; and regular . Rate (approx.): 138; Axis: left axis deviation; ST/T wave: normal;     Note written by Sandra Hunter, as dictated by Macie Tellez MD 10:35 PM    CONSULT NOTE:  2:21 PM Macie Tellez MD spoke with Dr. Hayes Tracy, Consult for Hospitalist.  Discussed available diagnostic tests and clinical findings. He will see and admit.

## 2018-06-27 NOTE — IP AVS SNAPSHOT
303 [facility] 
 
 
 566 [facility] 70 [facility] 
[phone] Patient: Hayley Hendrickson MRN: [id number] :1945 A check kali indicates which time of day the medication should be taken. My Medications START taking these medications Instructions Each Dose to Equal  
 Morning Noon Evening Bedtime  
 cefdinir 300 mg capsule Commonly known as:  OMNICEF Your last dose was: Your next dose is: Take 1 Cap by mouth two (2) times a day for 10 days. 300 mg CONTINUE taking these medications Instructions Each Dose to Equal  
 Morning Noon Evening Bedtime  
 acetaminophen 325 mg tablet Commonly known as:  TYLENOL Your last dose was: Your next dose is: Take 650 mg by mouth every six (6) hours as needed for Pain. 650 mg  
    
   
   
   
  
 aspirin 81 mg chewable tablet Your last dose was: Your next dose is: Take 81 mg by mouth daily. For 1 month, started 18  
 81 mg  
    
   
   
   
  
 biotin 10,000 mcg Cap Your last dose was: Your next dose is: Take 1 Cap by mouth daily. 1 Cap  
    
   
   
   
  
 carvedilol 6.25 mg tablet Commonly known as:  Reece Peat Your last dose was: Your next dose is: Take 6.25 mg by mouth two (2) times a day. 6.25 mg  
    
   
   
   
  
 CULTURELLE 10 billion cell capsule Generic drug:  lactobacillus rhamnosus gg 10 billion cell Your last dose was: Your next dose is: Take 1 Cap by mouth daily. 1 Cap DULERA 200-5 mcg/actuation HFA inhaler Generic drug:  mometasone-formoterol Your last dose was: Your next dose is: Take 2 Puffs by inhalation two (2) times a day. 2 Puff DULoxetine 60 mg capsule Commonly known as:  CYMBALTA Your last dose was: Your next dose is: Take 60 mg by mouth daily. 60 mg  
    
   
   
   
  
 DYMISTA 137-50 mcg/spray [place] Generic drug:  azelastine-fluticasone Your last dose was: Your next dose is:    
   
   
 1 Spray by Nasal route two (2) times daily as needed (allergies). 1 Spray HYDROcodone-acetaminophen 5-325 mg per tablet Commonly known as:  Dali Arreaga Your last dose was: Your next dose is: Take 1 Tab by mouth every four (4) hours as needed for Pain. 1 Tab  
    
   
   
   
  
 lisinopril 5 mg tablet Commonly known as:  Silvano Zambrano Your last dose was: Your next dose is: TAKE 1 TABLET BY MOUTH DAILY  
     
   
   
   
  
 loperamide 2 mg tablet Commonly known as:  IMMODIUM Your last dose was: Your next dose is: Take 2-4 mg by mouth as needed for Diarrhea. Give 2 tablets by mouth after the 1st loose stool and 1 tablet every 8 hours as needed for diarrhea, not to exceed 16 mg in 24 hours 2-4 mg  
    
   
   
   
  
 magnesium oxide 400 mg tablet Commonly known as:  MAG-OX Your last dose was: Your next dose is: Take 400 mg by mouth two (2) times a day. 400 mg  
    
   
   
   
  
 ondansetron 4 mg disintegrating tablet Commonly known as:  ZOFRAN ODT Your last dose was: Your next dose is: Take 4 mg by mouth every eight (8) hours as needed for Nausea. 4 mg ONGLYZA 5 mg Tab tablet Generic drug:  sAXagliptin Your last dose was: Your next dose is: Take 5 mg by mouth daily. Takes at 1630  
 5 mg PLAVIX 75 mg Tab Generic drug:  clopidogrel Your last dose was: Your next dose is: Take 75 mg by mouth daily. 75 mg  
    
   
   
   
  
 potassium chloride 20 mEq tablet Commonly known as:  SILVESTRE-DUR, KLOR-JERRY Your last dose was: Your next dose is: Take 20 mEq by mouth daily (with dinner). 20 mEq  
    
   
   
   
  
 predniSONE 10 mg tablet Commonly known as:  Frances Fields Your last dose was: Your next dose is: Take 20 mg by mouth daily. 20 mg  
    
   
   
   
  
 promethazine 25 mg tablet Commonly known as:  PHENERGAN Your last dose was: Your next dose is: Take 25 mg by mouth every six (6) hours as needed for Nausea. 25 mg PROTONIX 40 mg tablet Generic drug:  pantoprazole Your last dose was: Your next dose is: Take 40 mg by mouth daily. 40 mg  
    
   
   
   
  
 psyllium Powd Commonly known as:  METAMUCIL Your last dose was: Your next dose is: Take  by mouth daily. 2 teaspoons VITAMIN D3 1,000 unit tablet Generic drug:  cholecalciferol Your last dose was: Your next dose is: Take 1,000 Units by mouth daily. 1000 Units XARELTO 15 mg Tab tablet Generic drug:  rivaroxaban Your last dose was: Your next dose is: Take 15 mg by mouth daily. 15 mg Where to Get Your Medications These medications were sent to [address] [place] [address] AT [facility] 56  [address], [place] [address] [address] Phone:  [phone]  
  cefdinir 300 mg capsule

## 2018-06-27 NOTE — ED NOTES
Patient arrives via EMS from [address] where she is completing cardiac rehab from a recent MI. C/O shortness of breath, nausea, and indigestion. Reports nausea and indigestion where here complaints during her last MI. No elevation EKG per EMS.

## 2018-06-27 NOTE — IP AVS SNAPSHOT
[address] 
[phone] Patient: Tay Kingsley MRN: [id number] :1945 About your hospitalization You were admitted on:  2018 You last received care in the:  [facility] 3 PROG CARE TELE 1 You were discharged on:  2018 Why you were hospitalized Your primary diagnosis was:  Chest Pain Your diagnoses also included:  Generalized Abdominal Pain, Type Ii Diabetes Mellitus (Hcc), Coronary Artery Disease Involving Native Coronary Artery Without Angina Pectoris, Essential Hypertension, Recurrent Deep Vein Thrombosis (Dvt) ([facility]) Follow-up Information Follow up With Details Comments Contact Info Alison Longoria MD On 2018 stress test at [address] [place] [address] 
[phone] Alison Longoria MD On 2018 140 pm [address] [place] [address] 
[phone] Truman Lee MD   [address] 
[phone] Your Scheduled Appointments 2018  8:30 AM EDT  
NUCLEAR MEDICINE with NUCLEARNOE  
[facility] ([facility] SCHEDULING) [address] [place] [address]  
[phone] 2018  1:40 PM EDT  
ESTABLISHED PATIENT with Alison Longoria MD  
[facility] ([facility]) [address] [place] [address]  
[phone] Discharge Orders None A check kali indicates which time of day the medication should be taken. My Medications START taking these medications Instructions Each Dose to Equal  
 Morning Noon Evening Bedtime  
 cefdinir 300 mg capsule Commonly known as:  OMNICEF Your last dose was: Your next dose is: Take 1 Cap by mouth two (2) times a day for 10 days. 300 mg CONTINUE taking these medications Instructions Each Dose to Equal  
 Morning Noon Evening Bedtime  
 acetaminophen 325 mg tablet Commonly known as:  TYLENOL Your last dose was: Your next dose is: Take 650 mg by mouth every six (6) hours as needed for Pain. 650 mg  
    
   
   
   
  
 aspirin 81 mg chewable tablet Your last dose was: Your next dose is: Take 81 mg by mouth daily. For 1 month, started 6/7/18  
 81 mg  
    
   
   
   
  
 biotin 10,000 mcg Cap Your last dose was: Your next dose is: Take 1 Cap by mouth daily. 1 Cap  
    
   
   
   
  
 carvedilol 6.25 mg tablet Commonly known as:  Reece Peat Your last dose was: Your next dose is: Take 6.25 mg by mouth two (2) times a day. 6.25 mg  
    
   
   
   
  
 CULTURELLE 10 billion cell capsule Generic drug:  lactobacillus rhamnosus gg 10 billion cell Your last dose was: Your next dose is: Take 1 Cap by mouth daily. 1 Cap DULERA 200-5 mcg/actuation HFA inhaler Generic drug:  mometasone-formoterol Your last dose was: Your next dose is: Take 2 Puffs by inhalation two (2) times a day. 2 Puff DULoxetine 60 mg capsule Commonly known as:  CYMBALTA Your last dose was: Your next dose is: Take 60 mg by mouth daily. 60 mg  
    
   
   
   
  
 DYMISTA 137-50 mcg/spray [place] Generic drug:  azelastine-fluticasone Your last dose was: Your next dose is:    
   
   
 1 Spray by Nasal route two (2) times daily as needed (allergies). 1 Spray HYDROcodone-acetaminophen 5-325 mg per tablet Commonly known as:  Saroj Bourne Your last dose was: Your next dose is: Take 1 Tab by mouth every four (4) hours as needed for Pain. 1 Tab  
    
   
   
   
  
 lisinopril 5 mg tablet Commonly known as:  Prema Yip Your last dose was: Your next dose is: TAKE 1 TABLET BY MOUTH DAILY  
     
   
   
   
  
 loperamide 2 mg tablet Commonly known as:  IMMODIUM Your last dose was: Your next dose is: Take 2-4 mg by mouth as needed for Diarrhea. Give 2 tablets by mouth after the 1st loose stool and 1 tablet every 8 hours as needed for diarrhea, not to exceed 16 mg in 24 hours 2-4 mg  
    
   
   
   
  
 magnesium oxide 400 mg tablet Commonly known as:  MAG-OX Your last dose was: Your next dose is: Take 400 mg by mouth two (2) times a day. 400 mg  
    
   
   
   
  
 ondansetron 4 mg disintegrating tablet Commonly known as:  ZOFRAN ODT Your last dose was: Your next dose is: Take 4 mg by mouth every eight (8) hours as needed for Nausea. 4 mg ONGLYZA 5 mg Tab tablet Generic drug:  sAXagliptin Your last dose was: Your next dose is: Take 5 mg by mouth daily. Takes at 1630  
 5 mg PLAVIX 75 mg Tab Generic drug:  clopidogrel Your last dose was: Your next dose is: Take 75 mg by mouth daily. 75 mg  
    
   
   
   
  
 potassium chloride 20 mEq tablet Commonly known as:  K-DUR, KLOR-CON Your last dose was: Your next dose is: Take 20 mEq by mouth daily (with dinner). 20 mEq  
    
   
   
   
  
 predniSONE 10 mg tablet Commonly known as:  Sharlotte Corner Your last dose was: Your next dose is: Take 20 mg by mouth daily. 20 mg  
    
   
   
   
  
 promethazine 25 mg tablet Commonly known as:  PHENERGAN Your last dose was: Your next dose is: Take 25 mg by mouth every six (6) hours as needed for Nausea. 25 mg PROTONIX 40 mg tablet Generic drug:  pantoprazole Your last dose was: Your next dose is: Take 40 mg by mouth daily. 40 mg  
    
   
   
   
  
 psyllium Powd Commonly known as:  METAMUCIL Your last dose was: Your next dose is: Take  by mouth daily. 2 teaspoons VITAMIN D3 1,000 unit tablet Generic drug:  cholecalciferol Your last dose was: Your next dose is: Take 1,000 Units by mouth daily. 1000 Units XARELTO 15 mg Tab tablet Generic drug:  rivaroxaban Your last dose was: Your next dose is: Take 15 mg by mouth daily. 15 mg Where to Get Your Medications These medications were sent to [employer] [address] [place], [place] [address] AT [facility] 56  99 [facility], [place] [address] [address] Phone:  [phone]  
  cefdinir 300 mg capsule Opioid Education Prescription Opioids: What You Need to Know: 
 
 
ACUTE DIAGNOSES: 
Chest pain CHRONIC MEDICAL DIAGNOSES: 
Problem List as of 2018  Date Reviewed: 2018 Codes Class Noted - Resolved * (Principal)Chest pain ICD-10-CM: R07.9 ICD-9-CM: 786.50  2018 - Present Generalized abdominal pain ICD-10-CM: R10.84 ICD-9-CM: 789.07  2018 - Present Recurrent deep vein thrombosis (DVT) (HCC) ICD-10-CM: I82.409 ICD-9-CM: 453.40  3/30/2018 - Present Chronic anticoagulation ICD-10-CM: Z79.01 
ICD-9-CM: V58.61  3/30/2018 - Present Coronary artery disease involving native coronary artery without angina pectoris ICD-10-CM: I25.10 ICD-9-CM: 414.01  1/22/2016 - Present Essential hypertension ICD-10-CM: I10 
ICD-9-CM: 401.9  1/22/2016 - Present Type II diabetes mellitus (HCC) (Chronic) ICD-10-CM: E11.9 ICD-9-CM: 250.00  10/9/2015 - Present NSTEMI (non-ST elevated myocardial infarction) ([facility] [address] ICD-10-CM: I21.4 ICD-9-CM: 410.70  10/9/2015 - Present RESOLVED: CAD (coronary artery disease) ICD-10-CM: I25.10 ICD-9-CM: 414.00  10/9/2015 - 1/22/2016 RESOLVED: HTN (hypertension) ICD-10-CM: I10 
ICD-9-CM: 401.9  10/9/2015 - 1/22/2016 DISCHARGE MEDICATIONS:  
 
 
 
· It is important that you take the medication exactly as they are prescribed. · Keep your medication in the bottles provided by the pharmacist and keep a list of the medication names, dosages, and times to be taken in your wallet. · Do not take other medications without consulting your doctor. DIET:  Diabetic Diet ACTIVITY: Activity as tolerated ADDITIONAL INFORMATION: If you experience any of the following symptoms then please call your primary care physician or return to the emergency room if you cannot get hold of your doctor: Fever, chills, nausea, vomiting, diarrhea, change in mentation, falling, bleeding, shortness of breath. FOLLOW UP CARE: 
Dr. Wing Tad MD  you are to call and set up an appointment to see them with in 1 week. Follow-up with specialists at directed by them Information obtained by : 
I understand that if any problems occur once I am at home I am to contact my physician. I understand and acknowledge receipt of the instructions indicated above. Physician's or R.N.'s Signature                                                                  Date/Time Patient or Representative Signature                                                          Date/Time Nothing to eat or drink 2 hours prior to the stress test on July 17, 2018 at 830 am 
NO CAFFEINE 12 hours prior to the stress test (coffee, soda, tea, chocolate) No Tobacco products 6 hours prior to the stress test 
Wear comfortable clothes and shoes [employer] Announcement We are excited to announce that we are making your provider's discharge notes available to you in [employer]. You will see these notes when they are completed and signed by the physician that discharged you from your recent hospital stay. If you have any questions or concerns about any information you see in [employer], please call the Health Information Department where you were seen or reach out to your Primary Care Provider for more information about your plan of care. Introducing [facility]! Dear Wisam Pretty: Thank you for requesting a [employer] account. Our records indicate that you already have an active [employer] account. You can access your account anytime at [email or web address] Did you know that you can access your hospital and ER discharge instructions at any time in [employer]? You can also review all of your test results from your hospital stay or ER visit. Additional Information If you have questions, please visit the Frequently Asked Questions section of the [employer] website at [email or web address] Remember, [employer] is NOT to be used for urgent needs. For medical emergencies, dial 911. Now available from your iPhone and Android! Introducing Ivan Landin As a [facility] patient, I wanted to make you aware of our electronic visit tool called Ivan DiehlmPATH. [facility] 24/7 allows you to connect within minutes with a medical provider 24 hours a day, seven days a week via a mobile device or tablet or logging into a secure website from your computer. You can access Ivan Diehlfin from anywhere in the [place]. A virtual visit might be right for you when you have a simple condition and feel like you just dont want to get out of bed, or cant get away from work for an appointment, when your regular [facility] provider is not available (evenings, weekends or holidays), or when youre out of town and need minor care. Electronic visits cost only $49 and if the [facility] 24/7 provider determines a prescription is needed to treat your condition, one can be electronically transmitted to a nearby pharmacy*. Please take a moment to enroll today if you have not already done so. The enrollment process is free and takes just a few minutes. To enroll, please download the [facility] 24/7 shanice to your tablet or phone, or visit [email or web address] to enroll on your computer. And, as an [address] [place] [address] patient with a [employer] account, the results of your visits will be scanned into your electronic medical record and your primary care provider will be able to view the scanned results. We urge you to continue to see your regular [facility] provider for your ongoing medical care. And while your primary care provider may not be the one available when you seek a Ivan Landin virtual visit, the peace of mind you get from getting a real diagnosis real time can be priceless. For more information on Ivan Covarrubiasmonicafin, view our Frequently Asked Questions (FAQs) at [email or web address]. Sincerely, 
 
Nena Carr MD 
Chief Medical Officer Tony8 Jackelyn Phillips *:  certain medications cannot be prescribed via Ivan Landin Unresulted Labs-Please follow up with your PCP about these lab tests Order Current Status CULTURE, BLOOD, PAIRED Preliminary result Providers Seen During Your Hospitalization Provider Specialty Primary office phone Kathya Ulloa MD Emergency Medicine [phone] Sami Hoffman MD [facility] [phone] Your Primary Care Physician (PCP) Primary Care Physician Office Phone Office Fax Nolberto Castro [phone] You are allergic to the following Allergen Reactions Advair Diskus (Fluticasone-Salmeterol) Rash Ciprofloxacin Rash Statins-Hmg-Coa Reductase Inhibitors Other (comments) Muscle pain Lipitor/crestor/zocor Sulfa (Sulfonamide Antibiotics) Rash Tetracycline Other (comments) musclepain Recent Documentation Height Weight Breastfeeding? BMI OB Status Smoking Status 1.695 m 110.5 kg No 38.44 kg/m2 Hysterectomy Former Smoker Emergency Contacts Name Discharge Info Relation Home Work Mobile [address] CAREGIVER [3] Child [2] [phone] [place]  Other Relative [6] [phone] Maria L Ernandez  Child [2] [phone] Patient Belongings The following personal items are in your possession at time of discharge: 
  Dental Appliances: None  Visual Aid: Glasses, At bedside      Home Medications: None   Jewelry: None  Clothing: None    Other Valuables: None Please provide this summary of care documentation to your next provider. Signatures-by signing, you are acknowledging that this After Visit Summary has been reviewed with you and you have received a copy. Patient Signature:  ____________________________________________________________ Date:  ____________________________________________________________ `    
    
 Provider Signature:  ____________________________________________________________ Date:  ____________________________________________________________

## 2018-06-27 NOTE — H&P
Admission History and Physical      NAME:  Karsten Garcia   :   1945   MRN:  [id number]     PCP:  Parveen Baig MD     Date/Time:  2018           Assessment/Plan:       Chest pain (2018) / CAD:  Unclear etiology. However given hx of recent LAD stent, concern that this could be cardiac. Less likely PE as on Xarelto. No widened mediastinum on CXR. Given ASA and GI cocktail in ED.   -- check CT chest w/o contrast   -- trend troponin   -- obtain records from [address] [place] [address]   -- cardiology consult   -- continue DAPT   -- continue BB   -- hold ACE for NAIDA    Abdominal pain:  Patient reports having abdominal pains with prior NSTEMI. This may be anginal equivalent. -- check CT abd/pelvis w/o contrast   -- NPO for now   -- morphine prn for pain    Type II diabetes mellitus ([facility] (10/9/2015):   -- hold saxagliptin   -- add SSI alone for now    Essential hypertension (2016):   -- continue coreg   -- hold lisinopril for now    Recurrent deep vein thrombosis (DVT) ([facility] (3/30/2018): On chronic Xarelto. -- may need to hold xarelto if cardiac procedure planned, if so would consider bridge with lovenox  **6:14 PM  Cardiology notes recommends holding xarelto and using lovenox. Will stop xarelto (last dose was  am) and start lovenox 1mg/kg q12h. NAIDA:  May be due to IVVD. POC creatinine better after IVF. -- obtain records from [address] [place] [address]   -- continue IVF for now   -- monitor renal function   -- hold ACE    Leukocytosis:  Was elevated in 2017 as well. Afebrile. No obvious infection present. -- compare with records at [address] [place] [address]   -- check UA           Subjective:     CHIEF COMPLAINT:  abd pain, chest pain, sob    HISTORY OF PRESENT ILLNESS:     Ms. Karolina Castañeda is a 81 y.o.  female who is admitted with Chest pain.   Ms. Karolina Castañeda presented to the Emergency Department today complaining of tightness of abdomen and chest.  This woke her up from sleep at 1:30 AM.  No alleviating or aggravating factors. Associated with nausea and SOB. No fever, chills. No vomiting. No diarrhea. No dysuria or hematuria. Had stent to LAD earlier this month at [address] [place] [address] after 3 week stay at [facility] for pneumonia. Patient reports current symptoms feel similar to symptoms at time of NSTEMI in .       Past Medical History:   Diagnosis Date    Asthma     Autoimmune disease ([facility]     fibromyalgia    CAD (coronary artery disease) 10/9/2015    Diabetes ([facility]     DVT (deep vein thrombosis) in pregnancy ([facility]     GERD (gastroesophageal reflux disease)     HTN (hypertension)     NSTEMI (non-ST elevated myocardial infarction) ([facility] 10/9/2015    Sleep apnea     wears CPAP        Past Surgical History:   Procedure Laterality Date    ABDOMEN SURGERY PROC UNLISTED      hital hernia repair, gall bladder removed    BREAST SURGERY PROCEDURE UNLISTED      lumpectomy    CARDIAC SURG PROCEDURE UNLIST  10/9/15    2 stents    HX  SECTION      HX COLOSTOMY      and reversal, post episiotomy repair    HX HYSTERECTOMY  1970    HX UROLOGICAL  2009    bladder sling       Social History   Substance Use Topics    Smoking status: Former Smoker     Quit date: 3/30/2017    Smokeless tobacco: Never Used    Alcohol use 0.0 oz/week     0 Standard drinks or equivalent per week      Comment: very very rarely        Family History   Problem Relation Age of Onset    Diabetes Mother     Heart Failure Mother     Kidney Disease Mother     Diabetes Father     Heart Disease Father     Elevated Lipids Father     Heart Failure Father     Heart Disease Brother     Cancer Brother      kidney    Diabetes Brother     No Known Problems Brother     No Known Problems Brother         Allergies   Allergen Reactions    Advair Diskus [Fluticasone-Salmeterol] Rash    Ciprofloxacin Rash    Statins-Hmg-Coa Reductase Inhibitors Other (comments)     Muscle pain  Lipitor/crestor/zocor    Sulfa (Sulfonamide Antibiotics) Rash    Tetracycline Other (comments)     musclepain        Prior to Admission medications    Medication Sig Start Date End Date Taking? Authorizing Provider   evolocumab (REPATHA SURECLICK) pen injection 1 mL by SubCUTAneous route every fourteen (14) days. 4/13/18  Yes Chadwick Hill MD   rivaroxaban (XARELTO) 20 mg tab tablet Take  by mouth daily. Yes Historical Provider   lisinopril (PRINIVIL, ZESTRIL) 5 mg tablet TAKE 1 TABLET BY MOUTH DAILY 3/16/18  Yes Chadwick Hill MD   carvedilol (COREG) 12.5 mg tablet TAKE 1 TABLET BY MOUTH TWICE DAILY WITH MEALS  Patient taking differently: tAKE 1/2 TAB DAILY 1/22/18  Yes Chadwick Hill MD   biotin 10,000 mcg cap Take  by mouth daily. Yes Historical Provider   bumetanide (BUMEX) 1 mg tablet Take  by mouth as needed. Yes Historical Provider   saxagliptin (ONGLYZA) 5 mg tab tablet Take 5 mg by mouth daily. Yes Historical Provider   omeprazole (PRILOSEC) 20 mg capsule Take 20 mg by mouth daily. Yes Historical Provider   DULoxetine (CYMBALTA) 60 mg capsule Take 60 mg by mouth daily. Yes Historical Provider   cholecalciferol (VITAMIN D3) 1,000 unit tablet Take 2,000 Units by mouth daily. Yes Historical Provider   azelastine-fluticasone (DYMISTA) 137-50 mcg/spray spry 137 mcg by Nasal route two (2) times a day. Yes Historical Provider   mometasone-formoterol (DULERA) 200-5 mcg/actuation HFA inhaler Take 2 Puffs by inhalation two (2) times a day. Yes Historical Provider   predniSONE (DELTASONE) 10 mg tablet Take 10 mg by mouth daily (with dinner). Yes Historical Provider   B.infantis-B.ani-B.long-B.bifi (PROBIOTIC 4X) 10-15 mg TbEC Take 1 Tab by mouth daily.    Yes Historical Provider         Review of Systems:  (bold if positive, if negative)    Gen:  Eyes:  ENT:  CVS:  chest painPulm:  dyspneaGI:  Abdominal pain, nausea  :    MS:  Skin:  Endo:    Hem:  bruisingRenal:    Neuro:            Objective:      VITALS:    Vital signs reviewed; most recent are:    Visit Vitals    /59    Pulse (!) 129    Temp 98.5 °F (36.9 °C)    Resp 30    Wt 107.4 kg (236 lb 11.2 oz)    SpO2 96%    BMI 37.07 kg/m2     SpO2 Readings from Last 6 Encounters:   06/27/18 96%   03/30/18 95%   04/16/17 95%   04/16/17 94%   01/16/17 98%   07/15/16 96%        No intake or output data in the 24 hours ending 06/27/18 1445         Exam:     Physical Exam:    Gen:  obese, in mild acute distress due to pain  HEENT:  Pink conjunctivae, PERRL, hearing intact to voice, moist mucous membranes  Resp:  No accessory muscle use, clear breath sounds without wheezes rales or rhonchi  Card:  No murmurs, normal S1, S2 without thrills or peripheral edema  Abd:  Soft, minimal generalized tenderness w/o rebound or guarding, non-distended, normoactive bowel sounds are present  Musc:  No cyanosis  Skin:  No rashes or ulcers, skin turgor is good  Neuro:  Cranial nerves 3-12 are grossly intact,  strength is 5/5 bilaterally, dorsi / plantarflexion strength is 5/5 bilaterally, follows commands appropriately  Psych:  Alert with good insight. Oriented to person, place, and time       Labs:    Recent Labs      06/27/18   1049   WBC  17.7*   HGB  11.1*   HCT  36.5   PLT  283     Recent Labs      06/27/18   1049   NA  138   K  4.3   CL  103   CO2  22   GLU  172*   BUN  25*   CREA  1.41*   CA  9.1   ALB  2.9*   TBILI  0.8   SGOT  36   ALT  68     Lab Results   Component Value Date/Time    Glucose (POC) 155 (H) 06/27/2018 02:00 PM    Glucose (POC) 110 (H) 10/10/2015 07:38 AM    Glucose (POC) 93 10/09/2015 09:29 PM     No results for input(s): PH, PCO2, PO2, HCO3, FIO2 in the last 72 hours. No results for input(s): INR in the last 72 hours. No lab exists for component: INREXT    Chest Xray:  No acute process. EKG reviewed:   Sinus tachycardia. LAFB. No acute ischemic st/t wave changes. Medical records reviewed in preparation for this admission: Old medical records.     Surrogate decision maker: daughter    Total time spent in care of this patient: [address] discussed with: Patient and Family    Discussed:  Care Plan    Prophylaxis:  Xarelto    Probable Disposition:  SNF/LTC           ___________________________________________________    Attending Physician: Leonarda De Los Santos MD

## 2018-06-27 NOTE — PROGRESS NOTES
6/27/2018  2:15 PM    EMR reviewed, pt to [facility] ED c/o SOB and nausea, PTA pt had cardiac cath w/ stent placement at [facility], d/c'd to [address] [place] [address] for rehab. Pt resides at [address] [place] [address] independent living in 2 story town home w/ GF living and no entry steps, there is a basement w/ chair lift. Prior to cardiac procedure pt experienced SOB while visiting [facility] w/ hospital stay and was transported back to [address] for Tx. Prior to recent events pt was independent w/ ADL's, driving and ambulatory w/ cane, pt now using rollator. Camryn Earl MD;  Pt confirmed Medicare and Leydi Garcia has Rx coverage uses [employer] [address]. HH: None, admitted from [facility] for SNF rehab; DME: SP and Luz Schneider rollator. Reason for Admission:   SOB, Nausea                  RRAT Score:     13             Do you (patient/family) have any concerns for transition/discharge? Pt has no concerns for d/c, would like to return to [facility] to continue rehab              Plan for utilizing home health:     None    Likelihood of readmission? Moderate/Yellow due to cadio-pulmonary issues             Transition of Care Plan:      Hospital admission for medical management, continue rehab at [address] [place] [address] when stable, f/u w/ PCP and specialists. CM spoke w/ Elliot Stark @ [facility] they will accept pt back when stable. Floor CM to follow and assist w/ d/c needs. Care Management Interventions  PCP Verified by CM:  Yes Wilfredo Fletcher MD)  Palliative Care Criteria Met (RRAT>21 & CHF Dx)?: No  Mode of Transport at Discharge: Self (pt's DTR Juliane Diogo [phone] will transport)  Current Support Network: Lives Alone (pt lives alone independent living [facility] in [address] , no entry steps and GF bed/baht)  Confirm Follow Up Transport: Family (DTR Juliane Lind)  Discharge Location  Discharge Placement: Unable to determine at this time (PTA pt was at [facility] for rehab)    Evelin Paul    Rhonda Xiao MD;

## 2018-06-27 NOTE — CONSULTS
Víctor Mendoza MD    [address] [place], [address] [facility]    Office [phone],QJU [phone]  Pager [phone]    Date of  Admission: 6/27/2018 10:26 AM  PCP- Naina Hodgson MD    Rich Washington is a 81 y.o. female admitted for Chest pain. Consult requested by Marilia Zaragoza MD    Assessment/Plan    Chest Pain/Abd Pain - Trop neg  CAD s/p PCI to RCA with BMS ( NSTEMI 10/2015); PCI to LAD 5/2018 - [facility]  HTN -   HLD - intolerant to multiple statins sec to myalgia  DM -  History of right DVT-October 2016; Has had prior DVT 1998  Chronic anticoagulation   Leucocytosis  Sinus Tach    Plan:  Serial troponins. Echocardiogram.  Records from [facility] elevated. Patient clinically euvolemic. I and O. Abdominal pain-workup in progress with CT  If troponins continue to be negative and no other etiology for abdominal pain-we will consider stress nuclear. On Xarelto. Hold for now and IV heparin or SQLovenox in the interim in case patient needs invasive procedure. I appreciate the opportunity to be involved in Ms. Sellers. See below note for details. Please do not hesitate to contact us with questions or concerns. íVctor Mendoza MD    Cardiac Testing/ Procedures: A. Cardiac Cath/PCI: 5/2018-PCI to LAD-W  10/9/15 L Main: Medl;Nml     LAD: Prox- Med; 50%; Distal - small; D1 - Small to med - prox 60%     LCflex: Large; Tortuous; MLI; GUILLERMO - med - MLI     RCA: Med; Prox 80%; Distal 95% just before bifurcation into small PDA and PLB     LVEDP: 22     LVEF: 55%/ Mild basal inf hypokinesis     No significant gradient across aortic valve.     PCI: JR4 guide/BMW  Pre dil with 2 by 12 balloon  BMS 2.25 by 22 deployed at high [place] distally  BMS 2.5 by 18 deployed at high [place] proximal lesion  Post dil with 2.5 by 15        B. ECHO/MEE: 10/11/15 - Left ventricle: Systolic function was vigorous. Ejection fraction was  estimated in the range of 65 % to 70 %.  There were no regional wall motion  abnormalities.     C. StressNuclear/Stress ECHO/Stress test:     D. Vascular:     E. EP:     F. Miscellaneous:    Care Plan discussed with: Pt/family    Subjective:    Patient is a 68-year-old female who is admitted for abdominal pain/chest pain. Patient started having intermittent abdominal pain/nausea/chest pain since 1 AM.  Dyspnea +. No diaphoresis. States that she has been compliant with her medications including Plavix. Troponin negative. ProBNP elevated.  Recently prolonged admn in [facility] - for PNA/Collapsed lung    Past Medical History:   Diagnosis Date    Asthma     Autoimmune disease ([facility]     fibromyalgia    CAD (coronary artery disease) 10/9/2015    Diabetes ([facility]     DVT (deep vein thrombosis) in pregnancy ([facility]     GERD (gastroesophageal reflux disease)     HTN (hypertension)     NSTEMI (non-ST elevated myocardial infarction) ([facility] 10/9/2015    Sleep apnea     wears CPAP      Past Surgical History:   Procedure Laterality Date    ABDOMEN SURGERY PROC UNLISTED      hital hernia repair, gall bladder removed    BREAST SURGERY PROCEDURE UNLISTED      lumpectomy    CARDIAC SURG PROCEDURE UNLIST  10/9/15    2 stents    HX  SECTION      HX COLOSTOMY      and reversal, post episiotomy repair    HX HYSTERECTOMY      HX UROLOGICAL  2009    bladder sling     Allergies   Allergen Reactions    Advair Diskus [Fluticasone-Salmeterol] Rash    Ciprofloxacin Rash    Statins-Hmg-Coa Reductase Inhibitors Other (comments)     Muscle pain  Lipitor/crestor/zocor    Sulfa (Sulfonamide Antibiotics) Rash    Tetracycline Other (comments)     musclepain     Family History   Problem Relation Age of Onset    Diabetes Mother     Heart Failure Mother     Kidney Disease Mother     Diabetes Father     Heart Disease Father     Elevated Lipids Father     Heart Failure Father     Heart Disease Brother     Cancer Brother      kidney    Diabetes Brother     No Known Problems Brother     No Known Problems Brother       Social History   Substance Use Topics    Smoking status: Former Smoker     Quit date: 3/30/2017    Smokeless tobacco: Never Used    Alcohol use 0.0 oz/week     0 Standard drinks or equivalent per week      Comment: very very rarely          Medications:    (Not in a hospital admission)  Current Facility-Administered Medications   Medication Dose Route Frequency    sodium chloride (NS) flush 5-10 mL  5-10 mL IntraVENous Q8H    sodium chloride (NS) flush 5-10 mL  5-10 mL IntraVENous PRN    acetaminophen (TYLENOL) tablet 650 mg  650 mg Oral Q4H PRN    insulin lispro (HUMALOG) injection   SubCUTAneous AC&HS    glucose chewable tablet 16 g  4 Tab Oral PRN    dextrose (D50W) injection syrg 12.5-25 g  12.5-25 g IntraVENous PRN    glucagon (GLUCAGEN) injection 1 mg  1 mg IntraMUSCular PRN    morphine injection 2 mg  2 mg IntraVENous Q4H PRN    nitroglycerin (NITROBID) 2 % ointment 0.5 Inch  0.5 Inch Topical Q6H    0.9% sodium chloride infusion  75 mL/hr IntraVENous CONTINUOUS     Current Outpatient Prescriptions   Medication Sig    clopidogrel (PLAVIX) 75 mg tab Take 75 mg by mouth daily.  rivaroxaban (XARELTO) 15 mg tab tablet Take 15 mg by mouth daily.  aspirin 81 mg chewable tablet Take 81 mg by mouth daily.  lactobacillus rhamnosus gg 10 billion cell (CULTURELLE) 10 billion cell capsule Take 1 Cap by mouth daily.  promethazine (PHENERGAN) 25 mg tablet Take 25 mg by mouth every six (6) hours as needed for Nausea.  evolocumab (REPATHA SURECLICK) pen injection 1 mL by SubCUTAneous route every fourteen (14) days.  lisinopril (PRINIVIL, ZESTRIL) 5 mg tablet TAKE 1 TABLET BY MOUTH DAILY    carvedilol (COREG) 12.5 mg tablet TAKE 1 TABLET BY MOUTH TWICE DAILY WITH MEALS (Patient taking differently: tAKE 1/2 TAB DAILY)    biotin 10,000 mcg cap Take  by mouth daily.  bumetanide (BUMEX) 1 mg tablet Take  by mouth as needed.     saxagliptin (ONGLYZA) 5 mg tab tablet Take 5 mg by mouth daily. Takes at 1630    omeprazole (PRILOSEC) 20 mg capsule Take 20 mg by mouth daily.  DULoxetine (CYMBALTA) 60 mg capsule Take 60 mg by mouth daily.  cholecalciferol (VITAMIN D3) 1,000 unit tablet Take 1,000 Units by mouth daily.  azelastine-fluticasone (DYMISTA) 137-50 mcg/spray spry 1 Spray by Nasal route two (2) times daily as needed (allergies).  mometasone-formoterol (DULERA) 200-5 mcg/actuation HFA inhaler Take 2 Puffs by inhalation two (2) times a day.  predniSONE (DELTASONE) 10 mg tablet Take 10 mg by mouth daily (with dinner). Review of Systems:  (bold if positive, if negative)    As in HPI - rest of ROS noncontributory        Physical Exam:  Visit Vitals    /59    Pulse (!) 129    Temp 98.5 °F (36.9 °C)    Resp 30    Wt 236 lb 11.2 oz (107.4 kg)    SpO2 96%    BMI 37.07 kg/m2         Telemetry:     Gen: Well-developed, well-nourished, in no acute distress  HEENT:  Pink conjunctivae, hearing intact to voice, moist mucous membranes  Neck: Supple,No JVD, No Carotid Bruit, Thyroid- non tender  Resp: No accessory muscle use, Clear breath sounds, No rales or rhonchi  Card: Regular Rate,Rythm,Normal S1, S2, No murmurs, rubs or gallop. No thrills.    Abd:  Soft,normoactive bowel sounds are present, Epigastric tenderness+  MSK: No cyanosis or clubbing  Skin: No rashes or ulcers  Neuro:   moving all four extremities, no focal deficit, follows commands appropriately  Psych:  Fair insight, oriented to person, place and time, alert, Nml Affect  LE: No edema  Vascular:Radial Pulses 2+ and symmetric        EKG:       Cxray:    LABS:        Lab Results   Component Value Date/Time    WBC 17.7 (H) 06/27/2018 10:49 AM    HGB 11.1 (L) 06/27/2018 10:49 AM    HCT 36.5 06/27/2018 10:49 AM    PLATELET 011 04/08/0062 10:49 AM    MCV 90.1 06/27/2018 10:49 AM     Lab Results   Component Value Date/Time    Sodium 138 06/27/2018 10:49 AM Potassium 4.3 06/27/2018 10:49 AM    Chloride 103 06/27/2018 10:49 AM    CO2 22 06/27/2018 10:49 AM    Anion gap 13 06/27/2018 10:49 AM    Glucose 172 (H) 06/27/2018 10:49 AM    BUN 25 (H) 06/27/2018 10:49 AM    Creatinine 1.41 (H) 06/27/2018 10:49 AM    BUN/Creatinine ratio 18 06/27/2018 10:49 AM    GFR est AA 45 (L) 06/27/2018 10:49 AM    GFR est non-AA 37 (L) 06/27/2018 10:49 AM    Calcium 9.1 06/27/2018 10:49 AM     Lab Results   Component Value Date/Time    Troponin-I, Qt. <0.05 06/27/2018 10:49 AM     No results found for: APTT  No results found for: INR, PTMR, PTP, PT1, PT2  No results found for: BNP, BNPP, XBNPT      Sweetie Hyde MD

## 2018-06-28 LAB
ALBUMIN SERPL-MCNC: 2.3 G/DL (ref 3.5–5)
ALBUMIN/GLOB SERPL: 0.7 {RATIO} (ref 1.1–2.2)
ALP SERPL-CCNC: 128 U/L (ref 45–117)
ALT SERPL-CCNC: 57 U/L (ref 12–78)
ANION GAP SERPL CALC-SCNC: 8 MMOL/L (ref 5–15)
APPEARANCE UR: ABNORMAL
AST SERPL-CCNC: 37 U/L (ref 15–37)
BACTERIA URNS QL MICRO: ABNORMAL /HPF
BILIRUB SERPL-MCNC: 0.9 MG/DL (ref 0.2–1)
BILIRUB UR QL: NEGATIVE
BNP SERPL-MCNC: 501 PG/ML (ref 0–125)
BUN SERPL-MCNC: 17 MG/DL (ref 6–20)
BUN/CREAT SERPL: 16 (ref 12–20)
CALCIUM SERPL-MCNC: 7.8 MG/DL (ref 8.5–10.1)
CHLORIDE SERPL-SCNC: 106 MMOL/L (ref 97–108)
CO2 SERPL-SCNC: 23 MMOL/L (ref 21–32)
COLOR UR: ABNORMAL
CREAT SERPL-MCNC: 1.06 MG/DL (ref 0.55–1.02)
EPITH CASTS URNS QL MICRO: ABNORMAL /LPF
ERYTHROCYTE [DISTWIDTH] IN BLOOD BY AUTOMATED COUNT: 16.8 % (ref 11.5–14.5)
GLOBULIN SER CALC-MCNC: 3.3 G/DL (ref 2–4)
GLUCOSE BLD STRIP.AUTO-MCNC: 156 MG/DL (ref 65–100)
GLUCOSE BLD STRIP.AUTO-MCNC: 184 MG/DL (ref 65–100)
GLUCOSE BLD STRIP.AUTO-MCNC: 186 MG/DL (ref 65–100)
GLUCOSE BLD STRIP.AUTO-MCNC: 248 MG/DL (ref 65–100)
GLUCOSE SERPL-MCNC: 147 MG/DL (ref 65–100)
GLUCOSE UR STRIP.AUTO-MCNC: NEGATIVE MG/DL
HCT VFR BLD AUTO: 35 % (ref 35–47)
HGB BLD-MCNC: 10.4 G/DL (ref 11.5–16)
HGB UR QL STRIP: ABNORMAL
KETONES UR QL STRIP.AUTO: NEGATIVE MG/DL
LACTATE SERPL-SCNC: 1.4 MMOL/L (ref 0.4–2)
LEUKOCYTE ESTERASE UR QL STRIP.AUTO: ABNORMAL
MAGNESIUM SERPL-MCNC: 1.5 MG/DL (ref 1.6–2.4)
MCH RBC QN AUTO: 27.2 PG (ref 26–34)
MCHC RBC AUTO-ENTMCNC: 29.7 G/DL (ref 30–36.5)
MCV RBC AUTO: 91.6 FL (ref 80–99)
NITRITE UR QL STRIP.AUTO: POSITIVE
NRBC # BLD: 0 K/UL (ref 0–0.01)
NRBC BLD-RTO: 0 PER 100 WBC
PH UR STRIP: 5.5 [PH] (ref 5–8)
PLATELET # BLD AUTO: 182 K/UL (ref 150–400)
PMV BLD AUTO: 9.8 FL (ref 8.9–12.9)
POTASSIUM SERPL-SCNC: 3.6 MMOL/L (ref 3.5–5.1)
PROT SERPL-MCNC: 5.6 G/DL (ref 6.4–8.2)
PROT UR STRIP-MCNC: 30 MG/DL
RBC # BLD AUTO: 3.82 M/UL (ref 3.8–5.2)
RBC #/AREA URNS HPF: ABNORMAL /HPF (ref 0–5)
SERVICE CMNT-IMP: ABNORMAL
SODIUM SERPL-SCNC: 137 MMOL/L (ref 136–145)
SP GR UR REFRACTOMETRY: 1.01 (ref 1–1.03)
TROPONIN I SERPL-MCNC: <0.05 NG/ML
TROPONIN I SERPL-MCNC: <0.05 NG/ML
UA: UC IF INDICATED,UAUC: ABNORMAL
UROBILINOGEN UR QL STRIP.AUTO: 0.2 EU/DL (ref 0.2–1)
WBC # BLD AUTO: 8 K/UL (ref 3.6–11)
WBC URNS QL MICRO: ABNORMAL /HPF (ref 0–4)

## 2018-06-28 PROCEDURE — 93306 TTE W/DOPPLER COMPLETE: CPT

## 2018-06-28 PROCEDURE — 87186 SC STD MICRODIL/AGAR DIL: CPT | Performed by: FAMILY MEDICINE

## 2018-06-28 PROCEDURE — 83605 ASSAY OF LACTIC ACID: CPT | Performed by: FAMILY MEDICINE

## 2018-06-28 PROCEDURE — 77010033678 HC OXYGEN DAILY

## 2018-06-28 PROCEDURE — 80053 COMPREHEN METABOLIC PANEL: CPT | Performed by: INTERNAL MEDICINE

## 2018-06-28 PROCEDURE — 74011250637 HC RX REV CODE- 250/637: Performed by: INTERNAL MEDICINE

## 2018-06-28 PROCEDURE — 81001 URINALYSIS AUTO W/SCOPE: CPT | Performed by: FAMILY MEDICINE

## 2018-06-28 PROCEDURE — 74011000250 HC RX REV CODE- 250: Performed by: INTERNAL MEDICINE

## 2018-06-28 PROCEDURE — 74011250636 HC RX REV CODE- 250/636: Performed by: INTERNAL MEDICINE

## 2018-06-28 PROCEDURE — 85027 COMPLETE CBC AUTOMATED: CPT | Performed by: INTERNAL MEDICINE

## 2018-06-28 PROCEDURE — 83880 ASSAY OF NATRIURETIC PEPTIDE: CPT | Performed by: INTERNAL MEDICINE

## 2018-06-28 PROCEDURE — 87077 CULTURE AEROBIC IDENTIFY: CPT | Performed by: FAMILY MEDICINE

## 2018-06-28 PROCEDURE — 74011000258 HC RX REV CODE- 258: Performed by: FAMILY MEDICINE

## 2018-06-28 PROCEDURE — 36415 COLL VENOUS BLD VENIPUNCTURE: CPT | Performed by: FAMILY MEDICINE

## 2018-06-28 PROCEDURE — 74011636637 HC RX REV CODE- 636/637: Performed by: INTERNAL MEDICINE

## 2018-06-28 PROCEDURE — 87040 BLOOD CULTURE FOR BACTERIA: CPT | Performed by: FAMILY MEDICINE

## 2018-06-28 PROCEDURE — 51798 US URINE CAPACITY MEASURE: CPT

## 2018-06-28 PROCEDURE — 82962 GLUCOSE BLOOD TEST: CPT

## 2018-06-28 PROCEDURE — 83735 ASSAY OF MAGNESIUM: CPT | Performed by: INTERNAL MEDICINE

## 2018-06-28 PROCEDURE — 74011250636 HC RX REV CODE- 250/636: Performed by: FAMILY MEDICINE

## 2018-06-28 PROCEDURE — 94640 AIRWAY INHALATION TREATMENT: CPT

## 2018-06-28 PROCEDURE — 84484 ASSAY OF TROPONIN QUANT: CPT | Performed by: INTERNAL MEDICINE

## 2018-06-28 PROCEDURE — 87086 URINE CULTURE/COLONY COUNT: CPT | Performed by: FAMILY MEDICINE

## 2018-06-28 PROCEDURE — 65660000000 HC RM CCU STEPDOWN

## 2018-06-28 RX ADMIN — ASPIRIN 81 MG 81 MG: 81 TABLET ORAL at 08:24

## 2018-06-28 RX ADMIN — INSULIN LISPRO 2 UNITS: 100 INJECTION, SOLUTION INTRAVENOUS; SUBCUTANEOUS at 12:51

## 2018-06-28 RX ADMIN — ARFORMOTEROL TARTRATE 15 MCG: 15 SOLUTION RESPIRATORY (INHALATION) at 19:19

## 2018-06-28 RX ADMIN — ARFORMOTEROL TARTRATE 15 MCG: 15 SOLUTION RESPIRATORY (INHALATION) at 07:03

## 2018-06-28 RX ADMIN — ACETAMINOPHEN 650 MG: 325 TABLET ORAL at 08:23

## 2018-06-28 RX ADMIN — PREDNISONE 20 MG: 20 TABLET ORAL at 08:24

## 2018-06-28 RX ADMIN — BUDESONIDE 500 MCG: 0.5 INHALANT RESPIRATORY (INHALATION) at 19:19

## 2018-06-28 RX ADMIN — Medication 10 ML: at 21:02

## 2018-06-28 RX ADMIN — ACETAMINOPHEN 650 MG: 325 TABLET ORAL at 04:08

## 2018-06-28 RX ADMIN — NITROGLYCERIN 0.5 INCH: 20 OINTMENT TOPICAL at 12:51

## 2018-06-28 RX ADMIN — ENOXAPARIN SODIUM 100 MG: 100 INJECTION SUBCUTANEOUS at 17:32

## 2018-06-28 RX ADMIN — INSULIN LISPRO 2 UNITS: 100 INJECTION, SOLUTION INTRAVENOUS; SUBCUTANEOUS at 17:31

## 2018-06-28 RX ADMIN — BUDESONIDE 500 MCG: 0.5 INHALANT RESPIRATORY (INHALATION) at 07:03

## 2018-06-28 RX ADMIN — CLOPIDOGREL BISULFATE 75 MG: 75 TABLET ORAL at 08:23

## 2018-06-28 RX ADMIN — CARVEDILOL 6.25 MG: 6.25 TABLET, FILM COATED ORAL at 17:32

## 2018-06-28 RX ADMIN — INSULIN LISPRO 2 UNITS: 100 INJECTION, SOLUTION INTRAVENOUS; SUBCUTANEOUS at 22:34

## 2018-06-28 RX ADMIN — ENOXAPARIN SODIUM 100 MG: 100 INJECTION SUBCUTANEOUS at 06:47

## 2018-06-28 RX ADMIN — ACETAMINOPHEN 650 MG: 325 TABLET ORAL at 20:56

## 2018-06-28 RX ADMIN — INSULIN LISPRO 2 UNITS: 100 INJECTION, SOLUTION INTRAVENOUS; SUBCUTANEOUS at 08:24

## 2018-06-28 RX ADMIN — CARVEDILOL 6.25 MG: 6.25 TABLET, FILM COATED ORAL at 08:23

## 2018-06-28 RX ADMIN — PROMETHAZINE HYDROCHLORIDE 12.5 MG: 25 INJECTION INTRAMUSCULAR; INTRAVENOUS at 02:33

## 2018-06-28 RX ADMIN — DULOXETINE HYDROCHLORIDE 60 MG: 30 CAPSULE, DELAYED RELEASE ORAL at 08:23

## 2018-06-28 RX ADMIN — CEFTRIAXONE SODIUM 1 G: 1 INJECTION, POWDER, FOR SOLUTION INTRAMUSCULAR; INTRAVENOUS at 08:47

## 2018-06-28 RX ADMIN — PANTOPRAZOLE SODIUM 40 MG: 40 TABLET, DELAYED RELEASE ORAL at 08:23

## 2018-06-28 RX ADMIN — Medication 10 ML: at 06:51

## 2018-06-28 NOTE — PROGRESS NOTES
TRANSFER - IN REPORT:    Verbal report received from Monica RN (name) on Tony Mccarty  being received from ED (unit) for routine progression of care      Report consisted of patients Situation, Background, Assessment and   Recommendations(SBAR). Information from the following report(s) SBAR, Kardex, ED Summary, Intake/Output, MAR, Accordion, Recent Results, Med Rec Status and Cardiac Rhythm Sinus Tachycardia was reviewed with the receiving nurse. Opportunity for questions and clarification was provided. 2315 Initial assessment completed upon patients arrival to unit and care assumed. Introduced self as primary RN. VSS. She c/o abdominal pain and nausea.  Pt denies additional complaints at this time. Plan for the evening discussed with pt and verbalized understanding. Bed locked and in low position with call bell within reach.  Instructed him to press call hunt when needed. White board updated with this RN's name. Joe Manning S Garcia 106  Telephone call made to Dr. Dioni Rodríguez, report given in SBAR format in regards to pt's nausea. Order received for Zofran 4 mg IV Q6hrs PRN. 0120  Per pt, nausea has not subsided and she has now vomited yellow emesis. 0205  S/w Dr. Dioni Rodríguez, gave her pt's update regarding N/V, which has not subsided. Received and placed new orders for phenergan 12.5 mg IV Q6hrs PRN.    0258  Phenergan IV infusion completed. Pt confirms nausea has subsided, but she is still having abdominal cramps.    0320  Pt reassessed, denies nausea and abdominal discomfort. With the assistance of DANIEL Myrick, pt was boosted up in the bed and it was noted that she felt warmer than she had initially. Oral temp taken and returned 102.9. Recheck axillary, 103.0. Recheck orally, 102.9. Pt now presents tachypneic and HR has increased from low 100's to 130-140's. Will notify charge nurse for possible sepsis.

## 2018-06-28 NOTE — PROGRESS NOTES
6- CASE MANAGEMENT NOTE:  I spoke with the pt to introduce myself and explain the role of case management. The pt confirmed that she was admitted here from [facility] but was scheduled for discharge from there tomorrow. She was agreeable to my sending medicals to [facility] which I have done. CM will follow and assist with discharge needs.  Owen aGrcia, BSW, CM

## 2018-06-28 NOTE — PROGRESS NOTES
Cardiology Progress Note                             [address] [facility], [place], [address] [facility]                                 Phone [phone]; Fax [phone]        2018 8:55 AM     Admit Date:           2018  Admit Diagnosis:  Chest pain  :          1945   MRN:          [id number]   ASSESSMENT/RECOMMENDATION:   1)Chest pain/abdominal pain: negative trop x 2, no further CP this AM  -echo today  -records from [facility] received, to be reviewed    Elevated BNP: 1198 on admission, chest Xray clear  -echo pending    CAD s/p PCI to RCA with BMS ( NSTEMI 10/2015); PCI to LAD 2018 - CJW  -coreg/plavix/asa  -may d/c asa at discharge - as this would be one month of triple therapy     UTI: + nitrates, culture pending. Antibiotics started  Febrile, WBC normal today, elevated on admission   -sinus tachycardia rate 120's    HLD - intolerant to multiple statins sec to myalgia    History of right DVT-2016; Has had prior DVT   -Chronic anticoagulation xarelto- on sub q Lovenox in the event she needs a procedure     DM      Records reviewed: Cardaic cath at [facility] 18: radial - SAMUEL to ALD and ballon angioplasty to 2nd dx. Dr Figueroa Dia. Echo 6/3/18 LVEF 65-70% no RWMA. Mild LVH. Mild calcified annulus  ASA to be d/c 1 month post stent. Last 3 Recorded Weights in this Encounter    18 1039 18 0338   Weight: 236 lb 11.2 oz (107.4 kg) 237 lb 12.8 oz (107.9 kg)          1901 -  0700  In: -   Out: 200 [Urine:100]    SUBJECTIVE               Marqueta S Nivia [place] denies chest pain, SOB or ab pain.            Current Facility-Administered Medications   Medication Dose Route Frequency    promethazine (PHENERGAN) 12.5 mg in NS 50 mL IVPB  12.5 mg IntraVENous Q6H PRN    cefTRIAXone (ROCEPHIN) 1 g in 0.9% sodium chloride (MBP/ADV) 50 mL  1 g IntraVENous Q24H    sodium chloride (NS) flush 5-10 mL  5-10 mL IntraVENous Q8H    sodium chloride (NS) flush 5-10 mL  5-10 mL IntraVENous PRN    acetaminophen (TYLENOL) tablet 650 mg  650 mg Oral Q4H PRN    insulin lispro (HUMALOG) injection   SubCUTAneous AC&HS    glucose chewable tablet 16 g  4 Tab Oral PRN    dextrose (D50W) injection syrg 12.5-25 g  12.5-25 g IntraVENous PRN    glucagon (GLUCAGEN) injection 1 mg  1 mg IntraMUSCular PRN    morphine injection 2 mg  2 mg IntraVENous Q4H PRN    nitroglycerin (NITROBID) 2 % ointment 0.5 Inch  0.5 Inch Topical Q6H    0.9% sodium chloride infusion  100 mL/hr IntraVENous CONTINUOUS    aspirin chewable tablet 81 mg  81 mg Oral DAILY    carvedilol (COREG) tablet 6.25 mg  6.25 mg Oral BID    clopidogrel (PLAVIX) tablet 75 mg  75 mg Oral DAILY    pantoprazole (PROTONIX) tablet 40 mg  40 mg Oral DAILY    DULoxetine (CYMBALTA) capsule 60 mg  60 mg Oral DAILY    predniSONE (DELTASONE) tablet 20 mg  20 mg Oral DAILY    budesonide (PULMICORT) 500 mcg/2 ml nebulizer suspension  500 mcg Nebulization BID RT    arformoterol (BROVANA) neb solution 15 mcg  15 mcg Nebulization BID RT    albuterol-ipratropium (DUO-NEB) 2.5 MG-0.5 MG/3 ML  3 mL Nebulization Q4H PRN    enoxaparin (LOVENOX) injection 100 mg  100 mg SubCUTAneous Q12H    ondansetron (ZOFRAN) injection 4 mg  4 mg IntraVENous Q6H PRN      OBJECTIVE               Intake/Output Summary (Last 24 hours) at 06/28/18 0828  Last data filed at 06/28/18 6507   Gross per 24 hour   Intake                0 ml   Output              200 ml   Net             -200 ml       Review of Systems - History obtained from the patient AS PER  HPI    Telemetry 's    PHYSICAL EXAM        Visit Vitals    /59 (BP 1 Location: Right arm, BP Patient Position: At rest)    Pulse (!) 122    Temp (!) 100.9 °F (38.3 °C)    Resp 23    Wt 237 lb 12.8 oz (107.9 kg)    SpO2 95%    BMI 37.24 kg/m2       Gen: Well-developed, obese, in no acute distress  Drowsy and oriented x 3  HEENT:  Pink conjunctivae, Hearing grossly normal.No scleral icterus or conjunctival, moist mucous membranes  Neck: Supple,No JVD  Resp: No accessory muscle use, Clear breath sounds, No rales or rhonchi  Card: accelerated Rate,Rythm, No murmurs, rubs or gallop. No thrills.    GI:          soft, non-tender   MSK: No cyanosis or clubbing, good capillary refill  Skin: No rashes or ulcers, no bruising  Neuro:  Cranial nerves are grossly intact, moving all four extremities, no focal deficit, follows commands appropriately  Psych:  Good insight, oriented to person, place and time, alert, Nml Affect  LE: No edema       DATA REVIEW            Laboratory and Imaging have been reviewed by me and are notable for  Recent Labs      06/28/18   0413  06/27/18   1049   TROIQ  <0.05  <0.05     Recent Labs      06/28/18   0420  06/27/18   1049   NA  137  138   K  3.6  4.3   CO2  23  22   BUN  17  25*   CREA  1.06*  1.41*   GLU  147*  172*   MG  1.5*   --    WBC  8.0  17.7*   HGB  10.4*  11.1*   HCT  35.0  36.5   PLT  182  283             Deanne Moses NP

## 2018-06-28 NOTE — PROGRESS NOTES
8959  Dr. Katya Tate at the bed side. Provided SBAR report to include events from overnight: code sepsis called, febrile, tachycardic in the 120's. Pt positive for UTI. Antibiotics ordered. Will continue to monitor.

## 2018-06-28 NOTE — PROGRESS NOTES
Beginning of shift:  Bedside and Verbal shift change report given to Sofi Loco, RN (oncoming nurse) by Hannah Quintana RN (offgoing nurse). Report included the following information SBAR, Kardex, Intake/Output, MAR, Accordion, Recent Results, Med Rec Status and Cardiac Rhythm NSR.     1920  Initial assessment completed. Introduced self as primary RN. VSS. She is currently getting a breathing treatment. Daughter at the bedside. Pt is more alert and responsive than she had been the evening prior. She also appears better. She c/o slight h/a on a pain scale of 1/10 and is requesting Tylenol for it.  Pt denies additional complaints at this time. Plan for the evening discussed with pt and she has verbalized understanding. Bed locked and in low position with call bell within reach.  Instructed her to press call hunt when needed. White board updated with this RN's name. 2050  At the bedside to give pt Tylenol. Since pt's Víctorchris Mirandai[employer] was noted to be completely empty and dry, asked pt if she had been to the restroom to urinate. Pt stated that she has not had to urinate all day and that she has not had any sensation nor abdominal pressure. Informed her of need for a bladder scan. Pt verbalized understanding. 2220  Insulin given for BG of 248. Asked pt once again if she feels the urge to urinate, pt denied. Pt is asymptomatic. Bladder scan resulted with more than 700 ml. Informed pt that MD will be notified. [address]  Telephone call made to Dr. Jennifer Lim, reported pt's inability to void. New order received for a field placement for retention and to stop fluids. 2356  [facility] w/ a preliminary positive blood culture result of GRAM NEGATIVE COCCOBACILLI growing in 1 of 4 bottles (obtained from Fili Suh). Shift summary:  Since the Field insertion, pt has had an uneventful night.   She asked to have her CPAP placed and she slept for the rest of the evening w/ periodic interruption from staff. Bedside and Verbal shift change report given to MADAI aPdron (oncoming nurse) by Bertha Garcia RN (offgoing nurse). Report included the following information SBAR, Kardex, Procedure Summary, Intake/Output, MAR, Accordion, Recent Results, Med Rec Status and Cardiac Rhythm NSR.

## 2018-06-28 NOTE — PROGRESS NOTES
Daily Progress Note: 6/28/2018  Jaylin Lopez MD    Assessment/Plan:   Chest pain (6/27/2018) / CAD:  Unclear etiology. However given hx of recent LAD stent, concern that this could be cardiac. Less likely PE as on Xarelto. No widened mediastinum on CXR. Given ASA and GI cocktail in ED.                        -- check CT chest w/o contrast                        -- trend troponin                        -- obtain records from [address] [place] [address]                        -- cardiology consulted                        -- continue DAPT                        -- continue BB                        -- holding ACE for NAIDA     Abdominal pain:  Patient reports having abdominal pains with prior NSTEMI. This may be anginal equivalent. -- check CT abd/pelvis w/o contrast                        -- NPO for now                        -- morphine prn for pain     Type II diabetes mellitus ([facility] (10/9/2015):                        -- hold saxagliptin                        -- add SSI alone for now     Essential hypertension (1/22/2016):                        -- continue coreg                        -- hold lisinopril for now     Recurrent deep vein thrombosis (DVT) ([facility] (3/30/2018): On chronic Xarelto. --Cardiology notes recommends holding xarelto and using lovenox. Will stop xarelto (last dose was 6/26 am) and start lovenox 1mg/kg q12h.    NAIDA:  May be due to IVVD. POC creatinine better after IVF. -- obtain records from [address] [place] [address]                        -- continue IVF for now                        -- monitor renal function                        -- hold ACE     Leukocytosis:  Was elevated in 4/2017 as well.  Febrile overnight                        -- compare with records at [address] [place] [address]                        -- check UA- positive nitrates                        -- culture pending- add Rocephin                        -- lactic acid normal        Problem List:  Problem List as of 6/28/2018  Date Reviewed: 6/27/2018          Codes Class Noted - Resolved    * (Principal)Chest pain ICD-10-CM: R07.9  ICD-9-CM: 786.50  6/27/2018 - Present        Generalized abdominal pain ICD-10-CM: R10.84  ICD-9-CM: 789.07  6/27/2018 - Present        Recurrent deep vein thrombosis (DVT) ([facility] ICD-10-CM: I82.409  ICD-9-CM: 453.40  3/30/2018 - Present        Chronic anticoagulation ICD-10-CM: Z79.01  ICD-9-CM: V58.61  3/30/2018 - Present        Coronary artery disease involving native coronary artery without angina pectoris ICD-10-CM: I25.10  ICD-9-CM: 414.01  1/22/2016 - Present        Essential hypertension ICD-10-CM: I10  ICD-9-CM: 401.9  1/22/2016 - Present        Type II diabetes mellitus ([facility] (Chronic) ICD-10-CM: E11.9  ICD-9-CM: 250.00  10/9/2015 - Present        NSTEMI (non-ST elevated myocardial infarction) ([facility] ICD-10-CM: I21.4  ICD-9-CM: 410.70  10/9/2015 - Present        RESOLVED: CAD (coronary artery disease) ICD-10-CM: I25.10  ICD-9-CM: 414.00  10/9/2015 - 1/22/2016        RESOLVED: HTN (hypertension) ICD-10-CM: I10  ICD-9-CM: 401.9  10/9/2015 - 1/22/2016              HPI:     Ms. Hyacinth Johnson is a 81 y.o.  female who is admitted with Chest pain. Ms. Hyacinth Johnson presented to the Emergency Department today complaining of tightness of abdomen and chest.  This woke her up from sleep at 1:30 AM.  No alleviating or aggravating factors. Associated with nausea and SOB. No fever, chills. No vomiting. No diarrhea. No dysuria or hematuria. Had stent to LAD earlier this month at [address] [place] [address] after 3 week stay at [facility] for pneumonia. Patient reports current symptoms feel similar to symptoms at time of NSTEMI in 2015. (Dr Julius Dillon)    6/28/18: Febrile overnight and tachycardic. UA positive so will start Rocephin until we have cultures. Did not sleep well last night. WBC is better. Cardiology consult is pending. ECHO has been ordered.      Review of Systems:   A comprehensive review of systems was negative except for that written in the HPI. Objective:   Physical Exam:     Visit Vitals    /59 (BP 1 Location: Right arm, BP Patient Position: At rest)    Pulse (!) 122    Temp (!) 100.9 °F (38.3 °C)    Resp 23    Wt 237 lb 12.8 oz (107.9 kg)    SpO2 95%    BMI 37.24 kg/m2      O2 Device: Room air    Temp (24hrs), Av.2 °F (37.9 °C), Min:98.4 °F (36.9 °C), Max:102.4 °F (39.1 °C)         190 -  0700  In: -   Out: 200 [Urine:100]    General:  Alert, cooperative, no distress, appears stated age. Head:  Normocephalic, without obvious abnormality, atraumatic. Eyes:  Conjunctivae/corneas clear. PERRL, EOMs intact. Nose: Nares normal. Septum midline. Mucosa normal. No drainage or sinus tenderness. Throat: Lips, mucosa, and tongue normal. Teeth and gums normal.   Neck: Supple, symmetrical, trachea midline, no adenopathy, thyroid: no enlargement/tenderness/nodules, no carotid bruit and no JVD. Back:   Symmetric, no curvature. ROM normal. No CVA tenderness. Lungs:   Clear to auscultation bilaterally. Diminished breath sounds   Chest wall:  No tenderness or deformity. Heart:  Tachycardic but regular, S1, S2 normal, no murmur, click, rub or gallop. Abdomen:   Soft, non-tender. Bowel sounds normal. No masses,  No organomegaly. Extremities: Extremities normal, atraumatic, no cyanosis or edema. No calf tenderness or cords. Pulses: 2+ and symmetric all extremities. Skin: Skin color, texture, turgor normal. No rashes or lesions   Neurologic: CNII-XII intact. Alert and oriented X 3. Fine motor of hands and fingers normal.   equal.  No cogwheeling or rigidity. Gait not tested at this time. Sensation grossly normal to touch.   Gross motor of extremities normal.       Data Review:       Recent Days:  Recent Labs      18   0420  18   1049   WBC  8.0  17.7*   HGB  10.4*  11.1*   HCT  35.0  36.5   PLT  182  283     Recent Labs      18 0420  06/27/18   1049   NA  137  138   K  3.6  4.3   CL  106  103   CO2  23  22   GLU  147*  172*   BUN  17  25*   CREA  1.06*  1.41*   CA  7.8*  9.1   MG  1.5*   --    ALB  2.3*  2.9*   TBILI  0.9  0.8   SGOT  37  36   ALT  57  68     No results for input(s): PH, PCO2, PO2, HCO3, FIO2 in the last 72 hours. 24 Hour Results:  Recent Results (from the past 24 hour(s))   EKG, 12 LEAD, INITIAL    Collection Time: 06/27/18 10:35 AM   Result Value Ref Range    Ventricular Rate 138 BPM    Atrial Rate 138 BPM    P-R Interval 122 ms    QRS Duration 72 ms    Q-T Interval 292 ms    QTC Calculation (Bezet) 442 ms    Calculated P Axis 53 degrees    Calculated R Axis -87 degrees    Calculated T Axis 42 degrees    Diagnosis       Sinus tachycardia  Left axis deviation  Abnormal ECG  When compared with ECG of 16-APR-2017 19:28,  Vent. rate has increased BY  52 BPM  Confirmed by Flory Rodriguez MD., [facility] ([id number]) on 6/27/2018 8:04:20 PM     HEMOGLOBIN A1C WITH EAG    Collection Time: 06/27/18 10:48 AM   Result Value Ref Range    Hemoglobin A1c 6.4 (H) 4.2 - 6.3 %    Est. average glucose 591 mg/dL   METABOLIC PANEL, COMPREHENSIVE    Collection Time: 06/27/18 10:49 AM   Result Value Ref Range    Sodium 138 136 - 145 mmol/L    Potassium 4.3 3.5 - 5.1 mmol/L    Chloride 103 97 - 108 mmol/L    CO2 22 21 - 32 mmol/L    Anion gap 13 5 - 15 mmol/L    Glucose 172 (H) 65 - 100 mg/dL    BUN 25 (H) 6 - 20 MG/DL    Creatinine 1.41 (H) 0.55 - 1.02 MG/DL    BUN/Creatinine ratio 18 12 - 20      GFR est AA 45 (L) >60 ml/min/1.73m2    GFR est non-AA 37 (L) >60 ml/min/1.73m2    Calcium 9.1 8.5 - 10.1 MG/DL    Bilirubin, total 0.8 0.2 - 1.0 MG/DL    ALT (SGPT) 68 12 - 78 U/L    AST (SGOT) 36 15 - 37 U/L    Alk.  phosphatase 156 (H) 45 - 117 U/L    Protein, total 6.7 6.4 - 8.2 g/dL    Albumin 2.9 (L) 3.5 - 5.0 g/dL    Globulin 3.8 2.0 - 4.0 g/dL    A-G Ratio 0.8 (L) 1.1 - 2.2     CBC WITH AUTOMATED DIFF    Collection Time: 06/27/18 10:49 AM   Result Value Ref Range    WBC 17.7 (H) 3.6 - 11.0 K/uL    RBC 4.05 3.80 - 5.20 M/uL    HGB 11.1 (L) 11.5 - 16.0 g/dL    HCT 36.5 35.0 - 47.0 %    MCV 90.1 80.0 - 99.0 FL    MCH 27.4 26.0 - 34.0 PG    MCHC 30.4 30.0 - 36.5 g/dL    RDW 17.1 (H) 11.5 - 14.5 %    PLATELET 021 681 - 605 K/uL    MPV 9.5 8.9 - 12.9 FL    NRBC 0.0 0  WBC    ABSOLUTE NRBC 0.00 0.00 - 0.01 K/uL    NEUTROPHILS 85 (H) 32 - 75 %    LYMPHOCYTES 8 (L) 12 - 49 %    MONOCYTES 5 5 - 13 %    EOSINOPHILS 1 0 - 7 %    BASOPHILS 0 0 - 1 %    IMMATURE GRANULOCYTES 1 (H) 0.0 - 0.5 %    ABS. NEUTROPHILS 15.1 (H) 1.8 - 8.0 K/UL    ABS. LYMPHOCYTES 1.5 0.8 - 3.5 K/UL    ABS. MONOCYTES 0.9 0.0 - 1.0 K/UL    ABS. EOSINOPHILS 0.1 0.0 - 0.4 K/UL    ABS. BASOPHILS 0.1 0.0 - 0.1 K/UL    ABS. IMM. GRANS. 0.1 (H) 0.00 - 0.04 K/UL    DF AUTOMATED     TROPONIN I    Collection Time: 06/27/18 10:49 AM   Result Value Ref Range    Troponin-I, Qt. <0.05 <0.05 ng/mL   LIPASE    Collection Time: 06/27/18 10:49 AM   Result Value Ref Range    Lipase 334 73 - 393 U/L   NT-PRO BNP    Collection Time: 06/27/18 10:49 AM   Result Value Ref Range    NT pro-BNP 1198 (H) 0 - 125 PG/ML   POC CHEM8    Collection Time: 06/27/18  2:00 PM   Result Value Ref Range    Calcium, ionized (POC) 1.11 (L) 1.12 - 1.32 mmol/L    Sodium (POC) 137 136 - 145 mmol/L    Potassium (POC) 3.9 3.5 - 5.1 mmol/L    Chloride (POC) 104 98 - 107 mmol/L    CO2 (POC) 21 21 - 32 mmol/L    Anion gap (POC) 17 10 - 20 mmol/L    Glucose (POC) 155 (H) 65 - 100 mg/dL    BUN (POC) 23 (H) 9 - 20 mg/dL    Creatinine (POC) 1.1 0.6 - 1.3 mg/dL    GFRAA, POC 60 (L) >60 ml/min/1.73m2    GFRNA, POC 49 (L) >60 ml/min/1.73m2    Hematocrit (POC) 31 (L) 35.0 - 47.0 %    Comment Comment Not Indicated.      GLUCOSE, POC    Collection Time: 06/27/18  6:15 PM   Result Value Ref Range    Glucose (POC) 139 (H) 65 - 100 mg/dL    Performed by [employer]    TROPONIN I    Collection Time: 06/28/18  4:13 AM   Result Value Ref Range    Troponin-I, Qt. <0.05 <0.05 ng/mL   CBC W/O DIFF    Collection Time: 06/28/18  4:20 AM   Result Value Ref Range    WBC 8.0 3.6 - 11.0 K/uL    RBC 3.82 3.80 - 5.20 M/uL    HGB 10.4 (L) 11.5 - 16.0 g/dL    HCT 35.0 35.0 - 47.0 %    MCV 91.6 80.0 - 99.0 FL    MCH 27.2 26.0 - 34.0 PG    MCHC 29.7 (L) 30.0 - 36.5 g/dL    RDW 16.8 (H) 11.5 - 14.5 %    PLATELET 362 661 - 512 K/uL    MPV 9.8 8.9 - 12.9 FL    NRBC 0.0 0  WBC    ABSOLUTE NRBC 0.00 0.00 - 0.01 K/uL   MAGNESIUM    Collection Time: 06/28/18  4:20 AM   Result Value Ref Range    Magnesium 1.5 (L) 1.6 - 2.4 mg/dL   METABOLIC PANEL, COMPREHENSIVE    Collection Time: 06/28/18  4:20 AM   Result Value Ref Range    Sodium 137 136 - 145 mmol/L    Potassium 3.6 3.5 - 5.1 mmol/L    Chloride 106 97 - 108 mmol/L    CO2 23 21 - 32 mmol/L    Anion gap 8 5 - 15 mmol/L    Glucose 147 (H) 65 - 100 mg/dL    BUN 17 6 - 20 MG/DL    Creatinine 1.06 (H) 0.55 - 1.02 MG/DL    BUN/Creatinine ratio 16 12 - 20      GFR est AA >60 >60 ml/min/1.73m2    GFR est non-AA 51 (L) >60 ml/min/1.73m2    Calcium 7.8 (L) 8.5 - 10.1 MG/DL    Bilirubin, total 0.9 0.2 - 1.0 MG/DL    ALT (SGPT) 57 12 - 78 U/L    AST (SGOT) 37 15 - 37 U/L    Alk.  phosphatase 128 (H) 45 - 117 U/L    Protein, total 5.6 (L) 6.4 - 8.2 g/dL    Albumin 2.3 (L) 3.5 - 5.0 g/dL    Globulin 3.3 2.0 - 4.0 g/dL    A-G Ratio 0.7 (L) 1.1 - 2.2     LACTIC ACID    Collection Time: 06/28/18  4:22 AM   Result Value Ref Range    Lactic acid 1.4 0.4 - 2.0 MMOL/L   URINALYSIS W/ REFLEX CULTURE    Collection Time: 06/28/18  7:01 AM   Result Value Ref Range    Color YELLOW/STRAW      Appearance CLOUDY (A) CLEAR      Specific gravity 1.015 1.003 - 1.030      pH (UA) 5.5 5.0 - 8.0      Protein 30 (A) NEG mg/dL    Glucose NEGATIVE  NEG mg/dL    Ketone NEGATIVE  NEG mg/dL    Bilirubin NEGATIVE  NEG      Blood MODERATE (A) NEG      Urobilinogen 0.2 0.2 - 1.0 EU/dL    Nitrites POSITIVE (A) NEG      Leukocyte Esterase MODERATE (A) NEG      WBC  0 - 4 /hpf RBC 0-5 0 - 5 /hpf    Epithelial cells FEW FEW /lpf    Bacteria 3+ (A) NEG /hpf    UA:UC IF INDICATED URINE CULTURE ORDERED (A) CNI     GLUCOSE, POC    Collection Time: 06/28/18  7:55 AM   Result Value Ref Range    Glucose (POC) 156 (H) 65 - 100 mg/dL    Performed by Clarissa Vargas (PCT)        Medications reviewed  Current Facility-Administered Medications   Medication Dose Route Frequency    promethazine (PHENERGAN) 12.5 mg in NS 50 mL IVPB  12.5 mg IntraVENous Q6H PRN    cefTRIAXone (ROCEPHIN) 1 g in 0.9% sodium chloride (MBP/ADV) 50 mL  1 g IntraVENous Q24H    sodium chloride (NS) flush 5-10 mL  5-10 mL IntraVENous Q8H    sodium chloride (NS) flush 5-10 mL  5-10 mL IntraVENous PRN    acetaminophen (TYLENOL) tablet 650 mg  650 mg Oral Q4H PRN    insulin lispro (HUMALOG) injection   SubCUTAneous AC&HS    glucose chewable tablet 16 g  4 Tab Oral PRN    dextrose (D50W) injection syrg 12.5-25 g  12.5-25 g IntraVENous PRN    glucagon (GLUCAGEN) injection 1 mg  1 mg IntraMUSCular PRN    morphine injection 2 mg  2 mg IntraVENous Q4H PRN    nitroglycerin (NITROBID) 2 % ointment 0.5 Inch  0.5 Inch Topical Q6H    0.9% sodium chloride infusion  100 mL/hr IntraVENous CONTINUOUS    aspirin chewable tablet 81 mg  81 mg Oral DAILY    carvedilol (COREG) tablet 6.25 mg  6.25 mg Oral BID    clopidogrel (PLAVIX) tablet 75 mg  75 mg Oral DAILY    pantoprazole (PROTONIX) tablet 40 mg  40 mg Oral DAILY    DULoxetine (CYMBALTA) capsule 60 mg  60 mg Oral DAILY    predniSONE (DELTASONE) tablet 20 mg  20 mg Oral DAILY    budesonide (PULMICORT) 500 mcg/2 ml nebulizer suspension  500 mcg Nebulization BID RT    arformoterol (BROVANA) neb solution 15 mcg  15 mcg Nebulization BID RT    albuterol-ipratropium (DUO-NEB) 2.5 MG-0.5 MG/3 ML  3 mL Nebulization Q4H PRN    enoxaparin (LOVENOX) injection 100 mg  100 mg SubCUTAneous Q12H    ondansetron (ZOFRAN) injection 4 mg  4 mg IntraVENous Q6H PRN       Care Plan discussed with: Patient/Family and Consultant Cardiology    Total time spent with patient and review of records: 30 minutes.     Valerie Cobian MD

## 2018-06-28 NOTE — ED NOTES
TRANSFER - OUT REPORT:    Verbal report given to Guillermo Ruiz RN (name) on Dionicio Fajardo  being transferred to [facility] (unit) for routine progression of care       Report consisted of patients Situation, Background, Assessment and   Recommendations(SBAR). Information from the following report(s) SBAR, ED Summary, Procedure Summary, Intake/Output, MAR, Recent Results, Med Rec Status and Cardiac Rhythm NSR/ST was reviewed with the receiving nurse. Lines:   Peripheral IV 06/27/18 Left Antecubital (Active)        Opportunity for questions and clarification was provided.       Patient transported with:   Monitor  Registered Nurse

## 2018-06-29 LAB
GLUCOSE BLD STRIP.AUTO-MCNC: 121 MG/DL (ref 65–100)
GLUCOSE BLD STRIP.AUTO-MCNC: 214 MG/DL (ref 65–100)
GLUCOSE BLD STRIP.AUTO-MCNC: 225 MG/DL (ref 65–100)
GLUCOSE BLD STRIP.AUTO-MCNC: 275 MG/DL (ref 65–100)
SERVICE CMNT-IMP: ABNORMAL

## 2018-06-29 PROCEDURE — 97165 OT EVAL LOW COMPLEX 30 MIN: CPT

## 2018-06-29 PROCEDURE — 74011636637 HC RX REV CODE- 636/637: Performed by: INTERNAL MEDICINE

## 2018-06-29 PROCEDURE — 74011000258 HC RX REV CODE- 258: Performed by: FAMILY MEDICINE

## 2018-06-29 PROCEDURE — 82962 GLUCOSE BLOOD TEST: CPT

## 2018-06-29 PROCEDURE — 77010033678 HC OXYGEN DAILY

## 2018-06-29 PROCEDURE — 74011250636 HC RX REV CODE- 250/636: Performed by: INTERNAL MEDICINE

## 2018-06-29 PROCEDURE — 97535 SELF CARE MNGMENT TRAINING: CPT

## 2018-06-29 PROCEDURE — 97116 GAIT TRAINING THERAPY: CPT

## 2018-06-29 PROCEDURE — 97161 PT EVAL LOW COMPLEX 20 MIN: CPT

## 2018-06-29 PROCEDURE — 74011250637 HC RX REV CODE- 250/637: Performed by: INTERNAL MEDICINE

## 2018-06-29 PROCEDURE — 74011000250 HC RX REV CODE- 250: Performed by: INTERNAL MEDICINE

## 2018-06-29 PROCEDURE — 65660000000 HC RM CCU STEPDOWN

## 2018-06-29 PROCEDURE — 77030034850

## 2018-06-29 PROCEDURE — 94640 AIRWAY INHALATION TREATMENT: CPT

## 2018-06-29 PROCEDURE — 74011250636 HC RX REV CODE- 250/636: Performed by: FAMILY MEDICINE

## 2018-06-29 RX ADMIN — ASPIRIN 81 MG 81 MG: 81 TABLET ORAL at 08:23

## 2018-06-29 RX ADMIN — Medication 10 ML: at 07:51

## 2018-06-29 RX ADMIN — INSULIN LISPRO 3 UNITS: 100 INJECTION, SOLUTION INTRAVENOUS; SUBCUTANEOUS at 11:49

## 2018-06-29 RX ADMIN — PREDNISONE 20 MG: 20 TABLET ORAL at 08:23

## 2018-06-29 RX ADMIN — BUDESONIDE 500 MCG: 0.5 INHALANT RESPIRATORY (INHALATION) at 19:16

## 2018-06-29 RX ADMIN — ARFORMOTEROL TARTRATE 15 MCG: 15 SOLUTION RESPIRATORY (INHALATION) at 19:16

## 2018-06-29 RX ADMIN — ACETAMINOPHEN 650 MG: 325 TABLET ORAL at 19:58

## 2018-06-29 RX ADMIN — BUDESONIDE 500 MCG: 0.5 INHALANT RESPIRATORY (INHALATION) at 07:17

## 2018-06-29 RX ADMIN — CARVEDILOL 6.25 MG: 6.25 TABLET, FILM COATED ORAL at 08:23

## 2018-06-29 RX ADMIN — INSULIN LISPRO 5 UNITS: 100 INJECTION, SOLUTION INTRAVENOUS; SUBCUTANEOUS at 16:16

## 2018-06-29 RX ADMIN — ARFORMOTEROL TARTRATE 15 MCG: 15 SOLUTION RESPIRATORY (INHALATION) at 09:16

## 2018-06-29 RX ADMIN — ENOXAPARIN SODIUM 100 MG: 100 INJECTION SUBCUTANEOUS at 07:51

## 2018-06-29 RX ADMIN — CLOPIDOGREL BISULFATE 75 MG: 75 TABLET ORAL at 08:23

## 2018-06-29 RX ADMIN — Medication 10 ML: at 19:59

## 2018-06-29 RX ADMIN — DULOXETINE HYDROCHLORIDE 60 MG: 30 CAPSULE, DELAYED RELEASE ORAL at 08:23

## 2018-06-29 RX ADMIN — RIVAROXABAN 15 MG: 15 TABLET, FILM COATED ORAL at 17:07

## 2018-06-29 RX ADMIN — PANTOPRAZOLE SODIUM 40 MG: 40 TABLET, DELAYED RELEASE ORAL at 08:24

## 2018-06-29 RX ADMIN — CARVEDILOL 6.25 MG: 6.25 TABLET, FILM COATED ORAL at 17:02

## 2018-06-29 RX ADMIN — CEFTRIAXONE SODIUM 1 G: 1 INJECTION, POWDER, FOR SOLUTION INTRAMUSCULAR; INTRAVENOUS at 08:24

## 2018-06-29 RX ADMIN — INSULIN LISPRO 2 UNITS: 100 INJECTION, SOLUTION INTRAVENOUS; SUBCUTANEOUS at 21:00

## 2018-06-29 NOTE — CDMP QUERY
2. Please give the clinical significance of the following labs:     6/28 BC: 1/4 bottles + GNR    => Bacteremia related to + BC; rocephin IV  => Bacteremia ruled out  => Other explanation of clinical findings   => Clinically Undetermined (no explanation for clinical findings)    The medical record reflects the following clinical findings, treatment, and risk factors. Risk Factors:  + UC on 6/28    Clinical Indicators:  see above labs    Treatment: Monitor labs, VS, IV rocephin as ordered    Please clarify and document your clinical opinion in the progress notes and discharge summary including the definitive and/or presumptive diagnosis, (suspected or probable), related to the above clinical findings. Please include clinical findings supporting your diagnosis.     Thank you,  Mariam Corona, MSN, [place] [id number]

## 2018-06-29 NOTE — CDMP QUERY
1. There is noted documentation of: UA positive for this patient. Could this be further specified as:     => UTI POA related to + UC (GNR) treated with rocephin  => Other explanation of clinical findings   => Clinically Undetermined (no explanation for clinical findings)    The medical record reflects the following clinical findings, treatment, and risk factors. Risk Factors:  NAIDA/abd pain POA    Clinical Indicators:  6/28 UC: + GNR; UA + nitrates, WBC  + 3 bact    Treatment: Rocephin    Please clarify and document your clinical opinion in the progress notes and discharge summary including the definitive and/or presumptive diagnosis, (suspected or probable), related to the above clinical findings. Please include clinical findings supporting your diagnosis.     Thank you,  Daisy Jaimes MSN, [facility] [place] [address]

## 2018-06-29 NOTE — PROGRESS NOTES
Problem: Self Care Deficits Care Plan (Adult)  Goal: *Acute Goals and Plan of Care (Insert Text)  Occupational Therapy Goals  Initiated 6/29/2018  1. Patient will perform lower body dressing and bathing with modified independence within 7 day(s). 2.  Patient will perform toilet transfers with modified independence within 7 day(s). 3.  Patient will perform all aspects of toileting with modified independence within 7 day(s). 4.  Patient will participate in upper extremity therapeutic exercise/activities with modified independence for 10 minutes within 7 day(s). 5.  Patient will utilize energy conservation techniques during functional activities with verbal cues within 7 day(s). Occupational Therapy EVALUATION  Patient: Jamie Yusuf (18 y.o. female)  Date: 6/29/2018  Primary Diagnosis: Chest pain        Precautions: fall       ASSESSMENT :  Based on the objective data described below, the patient presents with decreased activity tolerance following admission on 6/27 for chest pain. [facility] recent MI + stent at [address] [place] [address] (6/6/18 per pt). Patient was anticipated to discharge home from rehab today. Her daughter is very involved and she has all necessary equipment set up at home and rollator used for transfers since going to rehab. Patient required supervision for both bed mobility and OOB transfers. She was able to ambulate into the bathroom for morning routine with no LOB noted with transfers or standing activity. Patient is currently independent with UB ADLs and required supervision to [place] [id number] for LB ADLs. Patient was educated on energy conservation, pacing, and pursed lip breathing techniques as well as adaptive ADL techniques; Min verbal cues needed for carryover with activity. Patient would benefit from continued skilled OT to progress toward goals and improve overlal independence.       Patient will benefit from skilled intervention to address the above impairments. Patients rehabilitation potential is considered to be Good  Factors which may influence rehabilitation potential include:   [x]             None noted  []             Mental ability/status  []             Medical condition  []             Home/family situation and support systems  []             Safety awareness  []             Pain tolerance/management  []             Other:      PLAN :  Recommendations and Planned Interventions:  [x]               Self Care Training                  [x]        Therapeutic Activities  [x]               Functional Mobility Training    []        Cognitive Retraining  [x]               Therapeutic Exercises           [x]        Endurance Activities  []               Balance Training                   []        Neuromuscular Re-Education  []               Visual/Perceptual Training     [x]   Home Safety Training  [x]               Patient Education                 [x]        Family Training/Education  []               Other (comment):    Frequency/Duration: Patient will be followed by occupational therapy 3 times a week to address goals. Discharge Recommendations: Home Health pending progress  Further Equipment Recommendations for Discharge: none at this time     SUBJECTIVE:   Patient stated I was really hoping to still go home today.     OBJECTIVE DATA SUMMARY:   HISTORY:   Past Medical History:   Diagnosis Date    Asthma     Autoimmune disease ([facility]     fibromyalgia    CAD (coronary artery disease) 10/9/2015    Diabetes ([facility]     DVT (deep vein thrombosis) in pregnancy ([facility]     GERD (gastroesophageal reflux disease)     HTN (hypertension)     NSTEMI (non-ST elevated myocardial infarction) ([facility] 10/9/2015    Sleep apnea     wears CPAP     Past Surgical History:   Procedure Laterality Date    ABDOMEN SURGERY PROC UNLISTED      hital hernia repair, gall bladder removed    BREAST SURGERY PROCEDURE UNLISTED      lumpectomy    CARDIAC SURG PROCEDURE UNLIST 10/9/15    2 stents    HX  SECTION      HX COLOSTOMY      and reversal, post episiotomy repair    HX HYSTERECTOMY  1970    HX UROLOGICAL  2009    bladder sling       Prior Level of Function/Environment/Context: Patient was admitted from [facility] rehab. She lives at [address] [place] [address]. See assessment section for PLOF information PTA. Home Situation  Home Environment: Independent living ([facility])  # Steps to Enter: 0  One/Two Story Residence: Two story, live on 1st floor  Lift Chair Available: Yes  Living Alone: Yes  Patient Expects to be Discharged to[de-identified] Private residence  Current DME Used/Available at Home: [place] , straight, Walker, rollator, Raised toilet seat, Safety frame toliet, Commode, bedside (hand held shower head)  Tub or Shower Type: Shower (built in seat)    Hand dominance: Right    EXAMINATION OF PERFORMANCE DEFICITS:  Cognitive/Behavioral Status:  Neurologic State: Alert  Orientation Level: Oriented X4  Cognition: Appropriate decision making; Appropriate for age attention/concentration; Appropriate safety awareness  Perception: Appears intact  Perseveration: No perseveration noted  Safety/Judgement: Awareness of environment    Skin: Intact in the uppers    Edema: None noted in the uppers    Hearing: Auditory  Auditory Impairment: None    Vision/Perceptual:    Tracking: Able to track stimulus in all quadrants w/o difficulty    Diplopia: No    Acuity: Within Defined Limits       Range of Motion:  WDL in the uppers     Strength:  WDL in the uppers however fatigues easily     Coordination:  Fine Motor Skills-Upper: Left Intact; Right Intact    Gross Motor Skills-Upper: Left Intact; Right Intact    Tone & Sensation:  Tone: normal  Sensation: intact    Balance:  Sitting: Intact  Standing: Intact; With support    Functional Mobility and Transfers for ADLs:  Bed Mobility:  Rolling: Supervision  Supine to Sit: Supervision  Sit to Supine:  (pt remained up at end of tx)  Scooting: Supervision    Transfers:  Sit to Stand: Supervision  Stand to Sit: Supervision  Bed to Chair: Supervision  Toilet Transfer : Supervision    ADL Assessment:  Feeding: Independent    Oral Facial Hygiene/Grooming: Independent    Bathing: Contact guard assistance    Upper Body Dressing: Independent    Lower Body Dressing: Contact guard assistance    Toileting: Supervision    Provided verbal cuing for education for breathing techniques including pursed lip breathing techniques (PLB) and circulatory breathing. Additionally, patient was educated on energy conservation techniques, including how they specifically apply to functional activities; This includes pacing, rest breaks, and planning. Patient with good verbal understanding however needing additional cuing through out tasks to use techniques. Additional time needed during tasks. Patient instructed and indicated understanding energy conservation techniques to increase independence and safety during all ADLs for end goal of returning home. Patient encouraged to use energy conservation techniques during ADLs to increase participation in life activities patient prefers, to ensure more frequent good days. If having a bad day, evaluate tasks completed day before and re-plan how to save energy to complete same task. Patient confirmed understanding of energy conservation techniques and demonstrated understanding during activity.       Cognitive Retraining  Safety/Judgement: Awareness of environment    Functional Measure:  Barthel Index:    Bathin  Bladder: 10  Bowels: 10  Groomin  Dressin  Feeding: 10  Mobility: 10  Stairs: 0  Toilet Use: 5  Transfer (Bed to Chair and Back): 10  Total: 65       Barthel and G-code impairment scale:  Percentage of impairment CH  0% CI  1-19% CJ  20-39% CK  40-59% CL  60-79% CM  80-99% CN  100%   Barthel Score 0-100 100 99-80 79-60 59-40 20-39 1-19   0   Barthel Score 0-20 20 17-19 13-16 9-12 5-8 1-4 0      The Barthel ADL Index: Guidelines  1. The index should be used as a record of what a patient does, not as a record of what a patient could do. 2. The main aim is to establish degree of independence from any help, physical or verbal, however minor and for whatever reason. 3. The need for supervision renders the patient not independent. 4. A patient's performance should be established using the best available evidence. Asking the patient, friends/relatives and nurses are the usual sources, but direct observation and common sense are also important. However direct testing is not needed. 5. Usually the patient's performance over the preceding 24-48 hours is important, but occasionally longer periods will be relevant. 6. Middle categories imply that the patient supplies over 50 per cent of the effort. 7. Use of aids to be independent is allowed. Bryson Liang., Barthel, D.W. (1755). Functional evaluation: the Barthel Index. [facility] (14)2. Jessica Chakraborty belen DAMI Riley, Lynn Sandoval., Gregor Adrian., [place], [address] (1999). Measuring the change indisability after inpatient rehabilitation; comparison of the responsiveness of the Barthel Index and Functional [place] Measure. Journal of Neurology, Neurosurgery, and Psychiatry, 66(4), 344-799. Shahnaz Lee, N.J.A, SANIYA Parry, & Akash Garcia, M.A. (2004.) Assessment of post-stroke quality of life in cost-effectiveness studies: The usefulness of the Barthel Index and the EuroQoL-5D. Quality of Life Research, 13, 561-15       G codes: In compliance with CMSs Claims Based Outcome Reporting, the following G-code set was chosen for this patient based on their primary functional limitation being treated: The outcome measure chosen to determine the severity of the functional limitation was the Barthel Index with a score of 65/100 which was correlated with the impairment scale. ?  Self Care:     - CURRENT STATUS: CJ - 20%-39% impaired, limited or restricted    - GOAL STATUS: CI - 1%-19% impaired, limited or restricted    - D/C STATUS:  ---------------To be determined---------------     Occupational Therapy Evaluation Charge Determination   History Examination Decision-Making   LOW Complexity : Brief history review  LOW Complexity : 1-3 performance deficits relating to physical, cognitive , or psychosocial skils that result in activity limitations and / or participation restrictions  LOW Complexity : No comorbidities that affect functional and no verbal or physical assistance needed to complete eval tasks       Based on the above components, the patient evaluation is determined to be of the following complexity level: LOW   Pain:  Pain Scale 1: Numeric (0 - 10)  Pain Intensity 1: 0              Activity Tolerance:   Patient tolerated eval well. After treatment:   [x] Patient left in no apparent distress sitting up in chair  [] Patient left in no apparent distress in bed  [x] Call bell left within reach  [x] Nursing notified  [] Caregiver present  [] Bed alarm activated    COMMUNICATION/EDUCATION:   The patients plan of care was discussed with: Physical Therapist, Registered Nurse and patient. .  [x] Home safety education was provided and the patient/caregiver indicated understanding. [x] Patient/family have participated as able in goal setting and plan of care. [x] Patient/family agree to work toward stated goals and plan of care. [] Patient understands intent and goals of therapy, but is neutral about his/her participation. [] Patient is unable to participate in goal setting and plan of care. This patients plan of care is appropriate for delegation to [facility].     Thank you for this referral.  Bertram Quiroz OTR/L  Time Calculation: 28 mins

## 2018-06-29 NOTE — PROGRESS NOTES
Jesus Bonilla MD    [address] [facility] [place], [address] [facility]    Office [phone],KBF [phone]  Pager [phone]    2018     Admit Date: 2018    Admit Diagnosis: Chest pain    NAME: Temi Ballesteros   :  1945     MRN: [id number]     Assessment/Plan:    Abd Pain/UTI -  CAD s/p PCI to RCA with BMS ( NSTEMI 10/2015); PCI to LAD 2018 - [facility]  HTN -   HLD - intolerant to multiple statins sec to myalgia  DM -  History of right DVT-2016; Has had prior DVT   Chronic anticoagulation        Plan:  D/w pt and daughter. Will obtain Amanda nuc stress test as OP   Restart Xarelto and DC Lovenox. On ASA/Plavix - recent stent. Will see prn. Will make appt to see in office. Plz call if needed. Please do not hesitate to contact us with questions or concerns. See note below for details. Jesus Bonilla MD      Subjective:  Abd pain improved .  No CP    ROS:  (bold if positive, if negative)          Medications before admission:     Current Facility-Administered Medications   Medication Dose Route Frequency    promethazine (PHENERGAN) 12.5 mg in NS 50 mL IVPB  12.5 mg IntraVENous Q6H PRN    cefTRIAXone (ROCEPHIN) 1 g in 0.9% sodium chloride (MBP/ADV) 50 mL  1 g IntraVENous Q24H    sodium chloride (NS) flush 5-10 mL  5-10 mL IntraVENous Q8H    sodium chloride (NS) flush 5-10 mL  5-10 mL IntraVENous PRN    acetaminophen (TYLENOL) tablet 650 mg  650 mg Oral Q4H PRN    insulin lispro (HUMALOG) injection   SubCUTAneous AC&HS    glucose chewable tablet 16 g  4 Tab Oral PRN    dextrose (D50W) injection syrg 12.5-25 g  12.5-25 g IntraVENous PRN    glucagon (GLUCAGEN) injection 1 mg  1 mg IntraMUSCular PRN    morphine injection 2 mg  2 mg IntraVENous Q4H PRN    aspirin chewable tablet 81 mg  81 mg Oral DAILY    carvedilol (COREG) tablet 6.25 mg  6.25 mg Oral BID    clopidogrel (PLAVIX) tablet 75 mg  75 mg Oral DAILY    pantoprazole (PROTONIX) tablet 40 mg  40 mg Oral DAILY    DULoxetine (CYMBALTA) capsule 60 mg  60 mg Oral DAILY    predniSONE (DELTASONE) tablet 20 mg  20 mg Oral DAILY    budesonide (PULMICORT) 500 mcg/2 ml nebulizer suspension  500 mcg Nebulization BID RT    arformoterol (BROVANA) neb solution 15 mcg  15 mcg Nebulization BID RT    albuterol-ipratropium (DUO-NEB) 2.5 MG-0.5 MG/3 ML  3 mL Nebulization Q4H PRN    enoxaparin (LOVENOX) injection 100 mg  100 mg SubCUTAneous Q12H    ondansetron (ZOFRAN) injection 4 mg  4 mg IntraVENous Q6H PRN       Physical Exam:  Visit Vitals    /59 (BP 1 Location: Left arm, BP Patient Position: At rest)    Pulse 88    Temp 98 °F (36.7 °C)    Resp 20    Wt 237 lb 12.8 oz (107.9 kg)    SpO2 98%    BMI 37.24 kg/m2    O2 Flow Rate (L/min): 2.5 l/min O2 Device: Room air      Telemetry:     Gen: Well-developed, well-nourished, in no acute distress, obese  Neck: Supple,No JVD, No Carotid Bruit,   Resp: No accessory muscle use, Clear breath sounds, No rales or rhonchi  Card: Regular Rate,Rythm,Normal S1, S2, No murmurs, rubs or gallop. No thrills.    Abd:  Soft, non-tender,BS+,   LE: No edema    EKG:        Cxray:    LABS:        Lab Results   Component Value Date/Time    WBC 8.0 06/28/2018 04:20 AM    HGB 10.4 (L) 06/28/2018 04:20 AM    HCT 35.0 06/28/2018 04:20 AM    PLATELET 283 07/05/5469 04:20 AM    MCV 91.6 06/28/2018 04:20 AM     Lab Results   Component Value Date/Time    Sodium 137 06/28/2018 04:20 AM    Potassium 3.6 06/28/2018 04:20 AM    Chloride 106 06/28/2018 04:20 AM    CO2 23 06/28/2018 04:20 AM    Anion gap 8 06/28/2018 04:20 AM    Glucose 147 (H) 06/28/2018 04:20 AM    BUN 17 06/28/2018 04:20 AM    Creatinine 1.06 (H) 06/28/2018 04:20 AM    BUN/Creatinine ratio 16 06/28/2018 04:20 AM    GFR est AA >60 06/28/2018 04:20 AM    GFR est non-AA 51 (L) 06/28/2018 04:20 AM    Calcium 7.8 (L) 06/28/2018 04:20 AM     Lab Results   Component Value Date/Time    Troponin-I, Qt. <0.05 06/28/2018 10:29 AM     No results found for: APTT  No results found for: INR, PTMR, PTP, PT1, PT2  No results found for: BNP, BNPP, XBNPT    Care Plan discussed with:   Nataliia Almonte MD

## 2018-06-29 NOTE — PROGRESS NOTES
Problem: Falls - Risk of  Goal: *Absence of Falls  Document Leonardo Fall Risk and appropriate interventions in the flowsheet.    Outcome: Progressing Towards Goal  Fall Risk Interventions:  Mobility Interventions: PT Consult for mobility concerns         Medication Interventions: Patient to call before getting OOB    Elimination Interventions: Call light in reach    History of Falls Interventions: Door open when patient unattended

## 2018-06-29 NOTE — PROGRESS NOTES
SHIFT REPORT:  1900- Bedside shift change report given to Chelsi Mitchell RN (oncoming nurse) by Ingrid Falcon RN (offgoing nurse). Report included the following information SBAR, Kardex, Procedure Summary, Intake/Output, Recent Results and Cardiac Rhythm. SHIFT SUMMARY:  2100-Lispro INsulin 2 unit SQ for glucose 214  0310-venous blood drawn for AM labs. END OF SHIFT REPORT:  0700-Bedside shift change report given to Dino Vargas (oncoming nurse) by Chelsi Mitchell RN (offgoing nurse). Report included the following information SBAR, Kardex, Procedure Summary, Intake/Output, Recent Results and Cardiac Rhythm .

## 2018-06-29 NOTE — PROGRESS NOTES
Problem: Falls - Risk of  Goal: *Absence of Falls  Document Leonardo Fall Risk and appropriate interventions in the flowsheet.    Outcome: Progressing Towards Goal  Fall Risk Interventions:  Mobility Interventions: PT Consult for mobility concerns         Medication Interventions: Patient to call before getting OOB    Elimination Interventions: Call light in reach    History of Falls Interventions: Bed/chair exit alarm

## 2018-06-29 NOTE — PROGRESS NOTES
Daily Progress Note: 6/29/2018  Mattie Hemphill MD    Assessment/Plan:   Chest pain (6/27/2018) / CAD:  Unclear etiology. However given hx of recent LAD stent, concern that this could be cardiac. Less likely PE as on Xarelto. No widened mediastinum on CXR. Given ASA and GI cocktail in ED.                        -- CT chest neg for PE                        -- trend troponin                        -- cardiology consulted                        -- continue DAPT                        -- continue BB                        -- holding ACE for NAIDA     Abdominal pain:  Patient reports having abdominal pains with prior NSTEMI. This may be anginal equivalent. -- CT abd/pelvis w/o contrast neg                        -- morphine prn for pain     Type II diabetes mellitus ([facility] (10/9/2015):                        -- holding saxagliptin                        -- add SSI alone for now     Essential hypertension (1/22/2016):                        -- continue coreg                        -- hold lisinopril for now     Recurrent deep vein thrombosis (DVT) ([facility] (3/30/2018): On chronic Xarelto. --Cardiology notes recommends holding xarelto and using lovenox. Will stop xarelto (last dose was 6/26 am) and start lovenox 1mg/kg q12h.    NAIDA:  May be due to IVVD. POC creatinine better after IVF. -- obtain records from [facility]                        -- continue IVF for now                        -- monitor renal function                        -- hold ACE     Leukocytosis:  Was elevated in 4/2017 as well.  Febrile overnight                        -- compare with records at [address] [place] [address]                        -- UA positive- BC positive 1/4                        -- added Rocephin                        -- lactic acid normal        Problem List:  Problem List as of 6/29/2018  Date Reviewed: 6/27/2018          Codes Class Noted - Resolved    * (Principal)Chest pain ICD-10-CM: R07.9  ICD-9-CM: 786.50  6/27/2018 - Present        Generalized abdominal pain ICD-10-CM: R10.84  ICD-9-CM: 789.07  6/27/2018 - Present        Recurrent deep vein thrombosis (DVT) (HCC) ICD-10-CM: I82.409  ICD-9-CM: 453.40  3/30/2018 - Present        Chronic anticoagulation ICD-10-CM: Z79.01  ICD-9-CM: V58.61  3/30/2018 - Present        Coronary artery disease involving native coronary artery without angina pectoris ICD-10-CM: I25.10  ICD-9-CM: 414.01  1/22/2016 - Present        Essential hypertension ICD-10-CM: I10  ICD-9-CM: 401.9  1/22/2016 - Present        Type II diabetes mellitus [facility] (Chronic) ICD-10-CM: E11.9  ICD-9-CM: 250.00  10/9/2015 - Present        NSTEMI (non-ST elevated myocardial infarction) ([facility] ICD-10-CM: I21.4  ICD-9-CM: 410.70  10/9/2015 - Present        RESOLVED: CAD (coronary artery disease) ICD-10-CM: I25.10  ICD-9-CM: 414.00  10/9/2015 - 1/22/2016        RESOLVED: HTN (hypertension) ICD-10-CM: I10  ICD-9-CM: 401.9  10/9/2015 - 1/22/2016              HPI:     Ms. Mann Quiles is a 81 y.o.  female who is admitted with Chest pain. Ms. Mann Quiles presented to the Emergency Department today complaining of tightness of abdomen and chest.  This woke her up from sleep at 1:30 AM.  No alleviating or aggravating factors. Associated with nausea and SOB. No fever, chills. No vomiting. No diarrhea. No dysuria or hematuria. Had stent to LAD earlier this month at [address] [place] [address] after 3 week stay at [facility] for pneumonia. Patient reports current symptoms feel similar to symptoms at time of NSTEMI in 2015. (Dr Benedict Caban)    6/28/18: Febrile overnight and tachycardic. UA positive so will start Rocephin until we have cultures. Did not sleep well last night. WBC is better. Cardiology consult is pending. ECHO has been ordered. 6/29/18: Feeling much better. AM labs are pending. BC positive 1/4. UC positive.      Review of Systems:   A comprehensive review of systems was negative except for that written in the HPI. Objective:   Physical Exam:     Visit Vitals    /65 (BP 1 Location: Left arm, BP Patient Position: At rest;Supine)    Pulse 83    Temp 97.6 °F (36.4 °C)    Resp 20    Wt 237 lb 12.8 oz (107.9 kg)    SpO2 98%    BMI 37.24 kg/m2    O2 Flow Rate (L/min): 2.5 l/min O2 Device: Room air    Temp (24hrs), Av.1 °F (36.7 °C), Min:97.1 °F (36.2 °C), Max:100.9 °F (38.3 °C)         1901 -  0700  In: 2796.3 [P.O.:360; I.V.:2436.3]  Out: 1350 [Urine:1000]    General:  Alert, cooperative, no distress, appears stated age. Head:  Normocephalic, without obvious abnormality, atraumatic. Eyes:  Conjunctivae/corneas clear. PERRL, EOMs intact. Nose: Nares normal. Septum midline. Mucosa normal. No drainage or sinus tenderness. Throat: Lips, mucosa, and tongue normal. Teeth and gums normal.   Neck: Supple, symmetrical, trachea midline, no adenopathy, thyroid: no enlargement/tenderness/nodules, no carotid bruit and no JVD. Back:   Symmetric, no curvature. ROM normal. No CVA tenderness. Lungs:   Clear to auscultation bilaterally. Diminished breath sounds   Chest wall:  No tenderness or deformity. Heart:  Tachycardic but regular, S1, S2 normal, no murmur, click, rub or gallop. Abdomen:   Soft, non-tender. Bowel sounds normal. No masses,  No organomegaly. Extremities: Extremities normal, atraumatic, no cyanosis or edema. No calf tenderness or cords. Pulses: 2+ and symmetric all extremities. Skin: Skin color, texture, turgor normal. No rashes or lesions   Neurologic: CNII-XII intact. Alert and oriented X 3. Fine motor of hands and fingers normal.   equal.  No cogwheeling or rigidity. Gait not tested at this time. Sensation grossly normal to touch.   Gross motor of extremities normal.       Data Review:       Recent Days:  Recent Labs      18   0420  18   1049   WBC  8.0  17.7*   HGB  10.4*  11.1*   HCT  35.0  36.5   PLT  182 283     Recent Labs      06/28/18   0420  06/27/18   1049   NA  137  138   K  3.6  4.3   CL  106  103   CO2  23  22   GLU  147*  172*   BUN  17  25*   CREA  1.06*  1.41*   CA  7.8*  9.1   MG  1.5*   --    ALB  2.3*  2.9*   TBILI  0.9  0.8   SGOT  37  36   ALT  57  68     No results for input(s): PH, PCO2, PO2, HCO3, FIO2 in the last 72 hours.     24 Hour Results:  Recent Results (from the past 24 hour(s))   GLUCOSE, POC    Collection Time: 06/28/18  7:55 AM   Result Value Ref Range    Glucose (POC) 156 (H) 65 - 100 mg/dL    Performed by Bonny Johnson ([facility])    TROPONIN I    Collection Time: 06/28/18 10:29 AM   Result Value Ref Range    Troponin-I, Qt. <0.05 <0.05 ng/mL   GLUCOSE, POC    Collection Time: 06/28/18 11:41 AM   Result Value Ref Range    Glucose (POC) 184 (H) 65 - 100 mg/dL    Performed by Bonny Johnson (P[facility])    GLUCOSE, POC    Collection Time: 06/28/18  4:01 PM   Result Value Ref Range    Glucose (POC) 186 (H) 65 - 100 mg/dL    Performed by Bonny Johnson ([facility])    GLUCOSE, POC    Collection Time: 06/28/18  8:38 PM   Result Value Ref Range    Glucose (POC) 248 (H) 65 - 100 mg/dL    Performed by Oli Andrade (P[facility])    GLUCOSE, POC    Collection Time: 06/29/18  7:11 AM   Result Value Ref Range    Glucose (POC) 121 (H) 65 - 100 mg/dL    Performed by Stuart Grissom        Medications reviewed  Current Facility-Administered Medications   Medication Dose Route Frequency    promethazine (PHENERGAN) 12.5 mg in NS 50 mL IVPB  12.5 mg IntraVENous Q6H PRN    cefTRIAXone (ROCEPHIN) 1 g in 0.9% sodium chloride (MBP/ADV) 50 mL  1 g IntraVENous Q24H    sodium chloride (NS) flush 5-10 mL  5-10 mL IntraVENous Q8H    sodium chloride (NS) flush 5-10 mL  5-10 mL IntraVENous PRN    acetaminophen (TYLENOL) tablet 650 mg  650 mg Oral Q4H PRN    insulin lispro (HUMALOG) injection   SubCUTAneous AC&HS    glucose chewable tablet 16 g  4 Tab Oral PRN    dextrose (D50W) injection syrg 12.5-25 g  12.5-25 g IntraVENous PRN  glucagon (GLUCAGEN) injection 1 mg  1 mg IntraMUSCular PRN    morphine injection 2 mg  2 mg IntraVENous Q4H PRN    aspirin chewable tablet 81 mg  81 mg Oral DAILY    carvedilol (COREG) tablet 6.25 mg  6.25 mg Oral BID    clopidogrel (PLAVIX) tablet 75 mg  75 mg Oral DAILY    pantoprazole (PROTONIX) tablet 40 mg  40 mg Oral DAILY    DULoxetine (CYMBALTA) capsule 60 mg  60 mg Oral DAILY    predniSONE (DELTASONE) tablet 20 mg  20 mg Oral DAILY    budesonide (PULMICORT) 500 mcg/2 ml nebulizer suspension  500 mcg Nebulization BID RT    arformoterol (BROVANA) neb solution 15 mcg  15 mcg Nebulization BID RT    albuterol-ipratropium (DUO-NEB) 2.5 MG-0.5 MG/3 ML  3 mL Nebulization Q4H PRN    enoxaparin (LOVENOX) injection 100 mg  100 mg SubCUTAneous Q12H    ondansetron (ZOFRAN) injection 4 mg  4 mg IntraVENous Q6H PRN       Care Plan discussed with: Patient/Family and Consultant Cardiology    Total time spent with patient and review of records: 30 minutes.     Gideon Wilkerson MD

## 2018-06-29 NOTE — PROGRESS NOTES
6- CASE MANAGEMENT NOTE:  I met with the pt who understands she will be in the hospital thru the weekend due to positive blood cultures. She is agreeable to returning to [facility] for additional rehab upon discharge. I sent updated medicals to [facility] yesterday and they are willing to accept her back.  Owen Garcia, BSW, CM

## 2018-06-29 NOTE — PROGRESS NOTES
Bedside and Verbal shift change report given to Vito (oncoming nurse) by Guillermo Ruiz (offgoing nurse). Report included the following information SBAR, Kardex, ED Summary, Intake/Output, MAR, Recent Results and Med Rec Status. Bedside and Verbal shift change report given to Pru (oncoming nurse) by Vito (offgoing nurse). Report included the following information SBAR, Kardex, ED Summary, Intake/Output, MAR and Recent Results.

## 2018-06-29 NOTE — PROGRESS NOTES
Problem: Mobility Impaired (Adult and Pediatric)  Goal: *Acute Goals and Plan of Care (Insert Text)  Physical Therapy Goals  Initiated 6/29/2018  1. Patient will move from supine to sit and sit to supine , scoot up and down and roll side to side in bed with independence within 7 day(s). 2.  Patient will transfer from bed to chair and chair to bed with independence using the least restrictive device within 7 day(s). 3.  Patient will perform sit to stand with independence within 7 day(s). 4.  Patient will ambulate with modified independence for 200 feet with the least restrictive device within 7 day(s). physical Therapy EVALUATION  Patient: Chanel Chowdhury (31 y.o. female)  Date: 6/29/2018  Primary Diagnosis: Chest pain               ASSESSMENT :  Based on the objective data described below, the patient presents with generalized weakness and debility. Ambulate with rolling walker in the room and around the bed modified independent, assisted to and from the bathroom modified independent, OOB to chair as tolerated performed active range of motion on both LE all planes. Notified nurse who agreed to monitor and assist back to bed when ask. Patient can ambulate to and from the bathroom modified independent. Patient will benefit from skilled intervention to address the above impairments.   Patients rehabilitation potential is considered to be Excellent  Factors which may influence rehabilitation potential include:   [x]         None noted  []         Mental ability/status  []         Medical condition  []         Home/family situation and support systems  []         Safety awareness  []         Pain tolerance/management  []         Other:      PLAN :  Recommendations and Planned Interventions:  [x]           Bed Mobility Training             []    Neuromuscular Re-Education  [x]           Transfer Training                   []    Orthotic/Prosthetic Training  [x]           Gait Training []    Modalities  [x]           Therapeutic Exercises           []    Edema Management/Control  [x]           Therapeutic Activities            []    Patient and Family Training/Education  []           Other (comment):    Frequency/Duration: Patient will be followed by physical therapy  3 times a week to address goals. Discharge Recommendations: None  Further Equipment Recommendations for Discharge: already has DME's     SUBJECTIVE:   Patient stated ok I feel better.     OBJECTIVE DATA SUMMARY:   HISTORY:    Past Medical History:   Diagnosis Date    Asthma     Autoimmune disease ([facility]     fibromyalgia    CAD (coronary artery disease) 10/9/2015    [facility]     DVT (deep vein thrombosis) in pregnancy ([facility]     GERD (gastroesophageal reflux disease)     HTN (hypertension)     NSTEMI (non-ST elevated myocardial infarction) ([facility] 10/9/2015    Sleep apnea     wears CPAP     Past Surgical History:   Procedure Laterality Date    ABDOMEN SURGERY PROC UNLISTED      hital hernia repair, gall bladder removed    BREAST SURGERY PROCEDURE UNLISTED      lumpectomy    CARDIAC SURG PROCEDURE UNLIST  10/9/15    2 stents    HX  SECTION      HX COLOSTOMY      and reversal, post episiotomy repair    HX HYSTERECTOMY      HX UROLOGICAL  2009    bladder sling     Prior Level of Function/Home Situation: modified independent with rolling walker.   Personal factors and/or comorbidities impacting plan of care:     Home Situation  Home Environment: Independent living ([facility])  # Steps to Enter: 0  One/Two Story Residence: Two story, live on 1st floor  Lift Chair Available: Yes  Living Alone: Yes  Patient Expects to be Discharged to[de-identified] Private residence  Current DME Used/Available at Home: Eveleen Frames, straight, Walker, rollator, Raised toilet seat, Safety frame toliet, Commode, bedside (hand held shower head)  Tub or Shower Type: Shower (built in seat)    EXAMINATION/PRESENTATION/DECISION MAKING: Critical Behavior:  Neurologic State: Alert  Orientation Level: Oriented X4  Cognition: Appropriate decision making, Appropriate for age attention/concentration, Appropriate safety awareness  Safety/Judgement: Awareness of environment  Hearing: Auditory  Auditory Impairment: None    Range Of Motion:  AROM: Within functional limits           PROM: Within functional limits           Strength:    Strength: Within functional limits                    Tone & Sensation:                                  Coordination:  Coordination: Within functional limits  Vision:   Tracking: Able to track stimulus in all quadrants w/o difficulty  Diplopia: No  Acuity: Within Defined Limits  Functional Mobility:  Bed Mobility:  Rolling: Supervision  Supine to Sit: Supervision  Sit to Supine:  (pt remained up at end of tx)  Scooting: Supervision  Transfers:  Sit to Stand: Supervision  Stand to Sit: Supervision  Stand Pivot Transfers: Supervision     Bed to Chair: Supervision              Balance:   Sitting: Intact  Standing: Intact; With support  Ambulation/Gait Training:  Distance (ft): 120 Feet (ft)  Assistive Device: Walker, rolling;Gait belt  Ambulation - Level of Assistance: Independent     Gait Description (WDL): Exceptions to WDL                             Therapeutic Exercises:    Instructed patient to continue active range of motion exercise on both legs while up on chair or on bed. Pain:  Pain Scale 1: Numeric (0 - 10)  Pain Intensity 1: 0              Activity Tolerance:   Good. Please refer to the flowsheet for vital signs taken during this treatment.   After treatment:   [x]         Patient left in no apparent distress sitting up in chair  []         Patient left in no apparent distress in bed  [x]         Call bell left within reach  [x]         Nursing notified  []         Caregiver present  []         Bed alarm activated    COMMUNICATION/EDUCATION:   The patients plan of care was discussed with: Occupational Therapist, Registered Nurse and patient. [x]         Fall prevention education was provided and the patient/caregiver indicated understanding. [x]         Patient/family have participated as able in goal setting and plan of care. [x]         Patient/family agree to work toward stated goals and plan of care. []         Patient understands intent and goals of therapy, but is neutral about his/her participation. []         Patient is unable to participate in goal setting and plan of care. Thank you for this referral.  Meenakshi Marshall, PT,WCC.    Time Calculation: 27 mins

## 2018-06-29 NOTE — DIABETES MGMT
DTC Progress Note    Recommendations/ Comments: If appropriate, please consider changing to resistant scale while on Prednisone. Post prandial results elevated. Required 8 units of correction yesterday and 3 units so far today with a good appetite. Current hospital DM medication: lispro insulin correction scale    Chart reviewed on [facility]. Patient is a 81 y.o. female with known  Type 2 Diabetes on oral agent (monotherapy): onglyza at home. A1c:   Lab Results   Component Value Date/Time    Hemoglobin A1c 6.4 (H) 06/27/2018 10:48 AM    Hemoglobin A1c 7.3 (H) 10/10/2015 05:00 AM       Recent Glucose Results: Lab Results   Component Value Date/Time    GLUCPOC 225 (H) 06/29/2018 11:45 AM    GLUCPOC 121 (H) 06/29/2018 07:11 AM    GLUCPOC 248 (H) 06/28/2018 08:38 PM        Lab Results   Component Value Date/Time    Creatinine 1.06 (H) 06/28/2018 04:20 AM     Estimated Creatinine Clearance: 62.2 mL/min (based on Cr of 1.06). Active Orders   Diet    DIET CARDIAC Regular; Consistent Carb 1200kcal        PO intake: Patient Vitals for the past 72 hrs:   % Diet Eaten   06/29/18 1421 100 %   06/29/18 0907 100 %   06/28/18 1830 100 %       Will continue to follow as needed.     Thank you

## 2018-06-30 LAB
ALBUMIN SERPL-MCNC: 2.1 G/DL (ref 3.5–5)
ALBUMIN/GLOB SERPL: 0.6 {RATIO} (ref 1.1–2.2)
ALP SERPL-CCNC: 98 U/L (ref 45–117)
ALT SERPL-CCNC: 38 U/L (ref 12–78)
ANION GAP SERPL CALC-SCNC: 10 MMOL/L (ref 5–15)
AST SERPL-CCNC: 16 U/L (ref 15–37)
BACTERIA SPEC CULT: ABNORMAL
BACTERIA SPEC CULT: ABNORMAL
BASOPHILS # BLD: 0 K/UL (ref 0–0.1)
BASOPHILS NFR BLD: 0 % (ref 0–1)
BILIRUB SERPL-MCNC: 0.3 MG/DL (ref 0.2–1)
BUN SERPL-MCNC: 11 MG/DL (ref 6–20)
BUN/CREAT SERPL: 11 (ref 12–20)
CALCIUM SERPL-MCNC: 8.3 MG/DL (ref 8.5–10.1)
CC UR VC: ABNORMAL
CHLORIDE SERPL-SCNC: 108 MMOL/L (ref 97–108)
CO2 SERPL-SCNC: 24 MMOL/L (ref 21–32)
CREAT SERPL-MCNC: 0.98 MG/DL (ref 0.55–1.02)
DIFFERENTIAL METHOD BLD: ABNORMAL
EOSINOPHIL # BLD: 0.1 K/UL (ref 0–0.4)
EOSINOPHIL NFR BLD: 1 % (ref 0–7)
ERYTHROCYTE [DISTWIDTH] IN BLOOD BY AUTOMATED COUNT: 16.1 % (ref 11.5–14.5)
GLOBULIN SER CALC-MCNC: 3.4 G/DL (ref 2–4)
GLUCOSE BLD STRIP.AUTO-MCNC: 101 MG/DL (ref 65–100)
GLUCOSE BLD STRIP.AUTO-MCNC: 124 MG/DL (ref 65–100)
GLUCOSE BLD STRIP.AUTO-MCNC: 166 MG/DL (ref 65–100)
GLUCOSE BLD STRIP.AUTO-MCNC: 198 MG/DL (ref 65–100)
GLUCOSE SERPL-MCNC: 103 MG/DL (ref 65–100)
HCT VFR BLD AUTO: 26.5 % (ref 35–47)
HGB BLD-MCNC: 8.2 G/DL (ref 11.5–16)
IMM GRANULOCYTES # BLD: 0 K/UL (ref 0–0.04)
IMM GRANULOCYTES NFR BLD AUTO: 1 % (ref 0–0.5)
LYMPHOCYTES # BLD: 1.4 K/UL (ref 0.8–3.5)
LYMPHOCYTES NFR BLD: 21 % (ref 12–49)
MAGNESIUM SERPL-MCNC: 1.7 MG/DL (ref 1.6–2.4)
MCH RBC QN AUTO: 27.2 PG (ref 26–34)
MCHC RBC AUTO-ENTMCNC: 30.9 G/DL (ref 30–36.5)
MCV RBC AUTO: 88 FL (ref 80–99)
MONOCYTES # BLD: 0.5 K/UL (ref 0–1)
MONOCYTES NFR BLD: 8 % (ref 5–13)
NEUTS SEG # BLD: 4.5 K/UL (ref 1.8–8)
NEUTS SEG NFR BLD: 69 % (ref 32–75)
NRBC # BLD: 0 K/UL (ref 0–0.01)
NRBC BLD-RTO: 0 PER 100 WBC
PLATELET # BLD AUTO: 204 K/UL (ref 150–400)
PMV BLD AUTO: 9.9 FL (ref 8.9–12.9)
POTASSIUM SERPL-SCNC: 3.2 MMOL/L (ref 3.5–5.1)
PROT SERPL-MCNC: 5.5 G/DL (ref 6.4–8.2)
RBC # BLD AUTO: 3.01 M/UL (ref 3.8–5.2)
SERVICE CMNT-IMP: ABNORMAL
SODIUM SERPL-SCNC: 142 MMOL/L (ref 136–145)
WBC # BLD AUTO: 6.6 K/UL (ref 3.6–11)

## 2018-06-30 PROCEDURE — 94640 AIRWAY INHALATION TREATMENT: CPT

## 2018-06-30 PROCEDURE — 74011000250 HC RX REV CODE- 250: Performed by: INTERNAL MEDICINE

## 2018-06-30 PROCEDURE — 36415 COLL VENOUS BLD VENIPUNCTURE: CPT | Performed by: FAMILY MEDICINE

## 2018-06-30 PROCEDURE — 82962 GLUCOSE BLOOD TEST: CPT

## 2018-06-30 PROCEDURE — 83735 ASSAY OF MAGNESIUM: CPT | Performed by: FAMILY MEDICINE

## 2018-06-30 PROCEDURE — 74011000258 HC RX REV CODE- 258: Performed by: FAMILY MEDICINE

## 2018-06-30 PROCEDURE — 74011250637 HC RX REV CODE- 250/637: Performed by: FAMILY MEDICINE

## 2018-06-30 PROCEDURE — 85025 COMPLETE CBC W/AUTO DIFF WBC: CPT | Performed by: FAMILY MEDICINE

## 2018-06-30 PROCEDURE — 74011250637 HC RX REV CODE- 250/637: Performed by: INTERNAL MEDICINE

## 2018-06-30 PROCEDURE — 80053 COMPREHEN METABOLIC PANEL: CPT | Performed by: FAMILY MEDICINE

## 2018-06-30 PROCEDURE — 65660000000 HC RM CCU STEPDOWN

## 2018-06-30 PROCEDURE — 74011636637 HC RX REV CODE- 636/637: Performed by: INTERNAL MEDICINE

## 2018-06-30 PROCEDURE — 74011250636 HC RX REV CODE- 250/636: Performed by: FAMILY MEDICINE

## 2018-06-30 RX ORDER — POTASSIUM CHLORIDE 750 MG/1
20 TABLET, FILM COATED, EXTENDED RELEASE ORAL 2 TIMES DAILY
Status: DISCONTINUED | OUTPATIENT
Start: 2018-06-30 | End: 2018-07-01 | Stop reason: HOSPADM

## 2018-06-30 RX ADMIN — PANTOPRAZOLE SODIUM 40 MG: 40 TABLET, DELAYED RELEASE ORAL at 09:11

## 2018-06-30 RX ADMIN — CARVEDILOL 6.25 MG: 6.25 TABLET, FILM COATED ORAL at 09:12

## 2018-06-30 RX ADMIN — DULOXETINE HYDROCHLORIDE 60 MG: 30 CAPSULE, DELAYED RELEASE ORAL at 09:11

## 2018-06-30 RX ADMIN — PREDNISONE 20 MG: 20 TABLET ORAL at 09:11

## 2018-06-30 RX ADMIN — BUDESONIDE 500 MCG: 0.5 INHALANT RESPIRATORY (INHALATION) at 19:50

## 2018-06-30 RX ADMIN — Medication 10 ML: at 03:10

## 2018-06-30 RX ADMIN — ARFORMOTEROL TARTRATE 15 MCG: 15 SOLUTION RESPIRATORY (INHALATION) at 07:12

## 2018-06-30 RX ADMIN — POTASSIUM CHLORIDE 20 MEQ: 750 TABLET, EXTENDED RELEASE ORAL at 17:37

## 2018-06-30 RX ADMIN — CARVEDILOL 6.25 MG: 6.25 TABLET, FILM COATED ORAL at 17:37

## 2018-06-30 RX ADMIN — ARFORMOTEROL TARTRATE 15 MCG: 15 SOLUTION RESPIRATORY (INHALATION) at 19:50

## 2018-06-30 RX ADMIN — INSULIN LISPRO 2 UNITS: 100 INJECTION, SOLUTION INTRAVENOUS; SUBCUTANEOUS at 17:37

## 2018-06-30 RX ADMIN — BUDESONIDE 500 MCG: 0.5 INHALANT RESPIRATORY (INHALATION) at 07:12

## 2018-06-30 RX ADMIN — ASPIRIN 81 MG 81 MG: 81 TABLET ORAL at 09:00

## 2018-06-30 RX ADMIN — RIVAROXABAN 15 MG: 15 TABLET, FILM COATED ORAL at 17:37

## 2018-06-30 RX ADMIN — CLOPIDOGREL BISULFATE 75 MG: 75 TABLET ORAL at 09:11

## 2018-06-30 RX ADMIN — POTASSIUM CHLORIDE 20 MEQ: 750 TABLET, EXTENDED RELEASE ORAL at 09:12

## 2018-06-30 RX ADMIN — Medication 10 ML: at 19:20

## 2018-06-30 RX ADMIN — Medication 1 CAPSULE: at 14:33

## 2018-06-30 RX ADMIN — CEFTRIAXONE SODIUM 1 G: 1 INJECTION, POWDER, FOR SOLUTION INTRAMUSCULAR; INTRAVENOUS at 09:11

## 2018-06-30 RX ADMIN — Medication 10 ML: at 14:34

## 2018-06-30 RX ADMIN — Medication 1 CAPSULE: at 19:19

## 2018-06-30 NOTE — PROGRESS NOTES
Daily Progress Note: 6/30/2018  Nicole Barnes MD    Assessment/Plan:   Chest pain (6/27/2018) / CAD:  Unclear etiology. However given hx of recent LAD stent, concern that this could be cardiac. Less likely PE as on Xarelto. No widened mediastinum on CXR. Given ASA and GI cocktail in ED.                        -- CT chest neg for PE                        -- trend troponin                        -- cardiology consulted                        -- continue DAPT                        -- continue BB                        -- holding ACE for NAIDA     Abdominal pain:  Patient reports having abdominal pains with prior NSTEMI. This may be anginal equivalent. -- CT abd/pelvis w/o contrast neg                        -- morphine prn for pain     Type II diabetes mellitus ([facility] [address] (10/9/2015):                        -- holding saxagliptin                        -- add SSI alone for now     Essential hypertension (1/22/2016):                        -- continue coreg                        -- hold lisinopril for now     Recurrent deep vein thrombosis (DVT) ([facility] [address] (3/30/2018): On chronic Xarelto. --Cardiology notes recommends holding xarelto and using lovenox. Will stop xarelto (last dose was 6/26 am) and start lovenox 1mg/kg q12h.    NAIDA:  May be due to IVVD. POC creatinine better after IVF. -- obtain records from [facility]                        -- continue IVF for now                        -- monitor renal function                        -- hold ACE     Leukocytosis due to Bacteremia w/ gram neg rods:  Was elevated in 4/2017 as well.  Febrile overnight                        -- compare with records at [address] [place] [address]                        -- UA positive- BC positive 1/4                        -- added Rocephin                        -- lactic acid normal     UTI due to indwelling field with gram neg rods                       -- voiding trial today -- rocephin     Hypokalemia                      -- replete and monitor       Problem List:  Problem List as of 6/30/2018  Date Reviewed: 6/27/2018          Codes Class Noted - Resolved    * (Principal)Chest pain ICD-10-CM: R07.9  ICD-9-CM: 786.50  6/27/2018 - Present        Generalized abdominal pain ICD-10-CM: R10.84  ICD-9-CM: 789.07  6/27/2018 - Present        Recurrent deep vein thrombosis (DVT) ([facility] ICD-10-CM: I82.409  ICD-9-CM: 453.40  3/30/2018 - Present        Chronic anticoagulation ICD-10-CM: Z79.01  ICD-9-CM: V58.61  3/30/2018 - Present        Coronary artery disease involving native coronary artery without angina pectoris ICD-10-CM: I25.10  ICD-9-CM: 414.01  1/22/2016 - Present        Essential hypertension ICD-10-CM: I10  ICD-9-CM: 401.9  1/22/2016 - Present        Type II diabetes mellitus ([facility] (Chronic) ICD-10-CM: E11.9  ICD-9-CM: 250.00  10/9/2015 - Present        NSTEMI (non-ST elevated myocardial infarction) ([facility] ICD-10-CM: I21.4  ICD-9-CM: 410.70  10/9/2015 - Present        RESOLVED: CAD (coronary artery disease) ICD-10-CM: I25.10  ICD-9-CM: 414.00  10/9/2015 - 1/22/2016        RESOLVED: HTN (hypertension) ICD-10-CM: I10  ICD-9-CM: 401.9  10/9/2015 - 1/22/2016              HPI:     Ms. Isabela Soto is a 81 y.o.  female who is admitted with Chest pain. Ms. Isabela Soto presented to the Emergency Department today complaining of tightness of abdomen and chest.  This woke her up from sleep at 1:30 AM.  No alleviating or aggravating factors. Associated with nausea and SOB. No fever, chills. No vomiting. No diarrhea. No dysuria or hematuria. Had stent to LAD earlier this month at [address] [place] [address] after 3 week stay at [facility] for pneumonia. Patient reports current symptoms feel similar to symptoms at time of NSTEMI in 2015. (Dr Fany Ivey)    6/28/18: Febrile overnight and tachycardic. UA positive so will start Rocephin until we have cultures. Did not sleep well last night.  WBC is better. Cardiology consult is pending. ECHO has been ordered. 18: Feeling much better. AM labs are pending. BC positive . UC positive. 18: Feeling much better. Afebrile. On rocephin now. Will d/c field and give voiding trial. K+ down so will replete. Review of Systems:   A comprehensive review of systems was negative except for that written in the HPI. Objective:   Physical Exam:     Visit Vitals    /75 (BP 1 Location: Left arm, BP Patient Position: At rest)    Pulse 92    Temp 98.5 °F (36.9 °C)    Resp 18    Ht 5' 6.75\" (1.695 m)    Wt 243 lb 9.6 oz (110.5 kg)    SpO2 96%    Breastfeeding No    BMI 38.44 kg/m2    O2 Flow Rate (L/min): 2.5 l/min O2 Device: Room air    Temp (24hrs), Av °F (36.7 °C), Min:97.3 °F (36.3 °C), Max:98.5 °F (36.9 °C)         190 -  0700  In: 1540 [P.O.:1540]  Out: 3350 [Urine:3350]    General:  Alert, cooperative, no distress, appears stated age. Head:  Normocephalic, without obvious abnormality, atraumatic. Eyes:  Conjunctivae/corneas clear. PERRL, EOMs intact. Nose: Nares normal. Septum midline. Mucosa normal. No drainage or sinus tenderness. Throat: Lips, mucosa, and tongue normal. Teeth and gums normal.   Neck: Supple, symmetrical, trachea midline, no adenopathy, thyroid: no enlargement/tenderness/nodules, no carotid bruit and no JVD. Back:   Symmetric, no curvature. ROM normal. No CVA tenderness. Lungs:   Clear to auscultation bilaterally. Diminished breath sounds   Chest wall:  No tenderness or deformity. Heart:  RRR , S1, S2 normal, no murmur, click, rub or gallop. Abdomen:   Soft, non-tender. Bowel sounds normal. No masses,  No organomegaly. Extremities: Extremities normal, atraumatic, no cyanosis or edema. No calf tenderness or cords. Pulses: 2+ and symmetric all extremities. Skin: Skin color, texture, turgor normal. No rashes or lesions   Neurologic: CNII-XII intact. Alert and oriented X 3.   Fine motor of hands and fingers normal.   equal.  No cogwheeling or rigidity. Gait not tested at this time. Sensation grossly normal to touch. Gross motor of extremities normal.       Data Review:       Recent Days:  Recent Labs      06/30/18   0307  06/28/18   0420  06/27/18   1049   WBC  6.6  8.0  17.7*   HGB  8.2*  10.4*  11.1*   HCT  26.5*  35.0  36.5   PLT  204  182  283     Recent Labs      06/30/18   0307  06/28/18   0420  06/27/18   1049   NA  142  137  138   K  3.2*  3.6  4.3   CL  108  106  103   CO2  24  23  22   GLU  103*  147*  172*   BUN  11  17  25*   CREA  0.98  1.06*  1.41*   CA  8.3*  7.8*  9.1   MG  1.7  1.5*   --    ALB  2.1*  2.3*  2.9*   TBILI  0.3  0.9  0.8   SGOT  16  37  36   ALT  38  57  68     No results for input(s): PH, PCO2, PO2, HCO3, FIO2 in the last 72 hours.     24 Hour Results:  Recent Results (from the past 24 hour(s))   GLUCOSE, POC    Collection Time: 06/29/18 11:45 AM   Result Value Ref Range    Glucose (POC) 225 (H) 65 - 100 mg/dL    Performed by Benjamin Steel, POC    Collection Time: 06/29/18  3:54 PM   Result Value Ref Range    Glucose (POC) 275 (H) 65 - 100 mg/dL    Performed by Alma Delia Stein    GLUCOSE, POC    Collection Time: 06/29/18  8:31 PM   Result Value Ref Range    Glucose (POC) 214 (H) 65 - 100 mg/dL    Performed by Travis Au (PCT)    CBC WITH AUTOMATED DIFF    Collection Time: 06/30/18  3:07 AM   Result Value Ref Range    WBC 6.6 3.6 - 11.0 K/uL    RBC 3.01 (L) 3.80 - 5.20 M/uL    HGB 8.2 (L) 11.5 - 16.0 g/dL    HCT 26.5 (L) 35.0 - 47.0 %    MCV 88.0 80.0 - 99.0 FL    MCH 27.2 26.0 - 34.0 PG    MCHC 30.9 30.0 - 36.5 g/dL    RDW 16.1 (H) 11.5 - 14.5 %    PLATELET 570 294 - 378 K/uL    MPV 9.9 8.9 - 12.9 FL    NRBC 0.0 0  WBC    ABSOLUTE NRBC 0.00 0.00 - 0.01 K/uL    NEUTROPHILS 69 32 - 75 %    LYMPHOCYTES 21 12 - 49 %    MONOCYTES 8 5 - 13 %    EOSINOPHILS 1 0 - 7 %    BASOPHILS 0 0 - 1 %    IMMATURE GRANULOCYTES 1 (H) 0.0 - 0.5 % ABS. NEUTROPHILS 4.5 1.8 - 8.0 K/UL    ABS. LYMPHOCYTES 1.4 0.8 - 3.5 K/UL    ABS. MONOCYTES 0.5 0.0 - 1.0 K/UL    ABS. EOSINOPHILS 0.1 0.0 - 0.4 K/UL    ABS. BASOPHILS 0.0 0.0 - 0.1 K/UL    ABS. IMM. GRANS. 0.0 0.00 - 0.04 K/UL    DF AUTOMATED     METABOLIC PANEL, COMPREHENSIVE    Collection Time: 06/30/18  3:07 AM   Result Value Ref Range    Sodium 142 136 - 145 mmol/L    Potassium 3.2 (L) 3.5 - 5.1 mmol/L    Chloride 108 97 - 108 mmol/L    CO2 24 21 - 32 mmol/L    Anion gap 10 5 - 15 mmol/L    Glucose 103 (H) 65 - 100 mg/dL    BUN 11 6 - 20 MG/DL    Creatinine 0.98 0.55 - 1.02 MG/DL    BUN/Creatinine ratio 11 (L) 12 - 20      GFR est AA >60 >60 ml/min/1.73m2    GFR est non-AA 56 (L) >60 ml/min/1.73m2    Calcium 8.3 (L) 8.5 - 10.1 MG/DL    Bilirubin, total 0.3 0.2 - 1.0 MG/DL    ALT (SGPT) 38 12 - 78 U/L    AST (SGOT) 16 15 - 37 U/L    Alk.  phosphatase 98 45 - 117 U/L    Protein, total 5.5 (L) 6.4 - 8.2 g/dL    Albumin 2.1 (L) 3.5 - 5.0 g/dL    Globulin 3.4 2.0 - 4.0 g/dL    A-G Ratio 0.6 (L) 1.1 - 2.2     MAGNESIUM    Collection Time: 06/30/18  3:07 AM   Result Value Ref Range    Magnesium 1.7 1.6 - 2.4 mg/dL   GLUCOSE, POC    Collection Time: 06/30/18  7:17 AM   Result Value Ref Range    Glucose (POC) 101 (H) 65 - 100 mg/dL    Performed by Louisa Downey        Medications reviewed  Current Facility-Administered Medications   Medication Dose Route Frequency    potassium chloride SR (KLOR-CON 10) tablet 20 mEq  20 mEq Oral BID    rivaroxaban (XARELTO) tablet 15 mg  15 mg Oral DAILY WITH DINNER    promethazine (PHENERGAN) 12.5 mg in NS 50 mL IVPB  12.5 mg IntraVENous Q6H PRN    cefTRIAXone (ROCEPHIN) 1 g in 0.9% sodium chloride (MBP/ADV) 50 mL  1 g IntraVENous Q24H    sodium chloride (NS) flush 5-10 mL  5-10 mL IntraVENous Q8H    sodium chloride (NS) flush 5-10 mL  5-10 mL IntraVENous PRN    acetaminophen (TYLENOL) tablet 650 mg  650 mg Oral Q4H PRN    insulin lispro (HUMALOG) injection   SubCUTAneous AC&HS  glucose chewable tablet 16 g  4 Tab Oral PRN    dextrose (D50W) injection syrg 12.5-25 g  12.5-25 g IntraVENous PRN    glucagon (GLUCAGEN) injection 1 mg  1 mg IntraMUSCular PRN    morphine injection 2 mg  2 mg IntraVENous Q4H PRN    aspirin chewable tablet 81 mg  81 mg Oral DAILY    carvedilol (COREG) tablet 6.25 mg  6.25 mg Oral BID    clopidogrel (PLAVIX) tablet 75 mg  75 mg Oral DAILY    pantoprazole (PROTONIX) tablet 40 mg  40 mg Oral DAILY    DULoxetine (CYMBALTA) capsule 60 mg  60 mg Oral DAILY    predniSONE (DELTASONE) tablet 20 mg  20 mg Oral DAILY    budesonide (PULMICORT) 500 mcg/2 ml nebulizer suspension  500 mcg Nebulization BID RT    arformoterol (BROVANA) neb solution 15 mcg  15 mcg Nebulization BID RT    albuterol-ipratropium (DUO-NEB) 2.5 MG-0.5 MG/3 ML  3 mL Nebulization Q4H PRN    ondansetron (ZOFRAN) injection 4 mg  4 mg IntraVENous Q6H PRN       Care Plan discussed with: Patient and Family    Total time spent with patient and review of records: 30 minutes.     Marguerite Guy MD

## 2018-06-30 NOTE — PROGRESS NOTES
SHIFT REPORT:  1900- Bedside shift change report given to Radha Mccormick RN (oncoming nurse) by Elena Adams RN (offgoing nurse). Report included the following information SBAR, Kardex, Procedure Summary, Intake/Output, Recent Results and Cardiac Rhythm. SHIFT SUMMARY:  0200-venous blood drawn for AM labs. 0400-unable to collect stool spec as pt had mixed stool and urine  END OF SHIFT REPORT:  0700-Bedside shift change report given to Maria Isabel Brock RN (oncoming nurse) by Radha Mccormick RN (offgoing nurse). Report included the following information SBAR, Kardex, Procedure Summary, Intake/Output, Recent Results and Cardiac Rhythm .

## 2018-06-30 NOTE — PROGRESS NOTES
Problem: Falls - Risk of  Goal: *Absence of Falls  Document Leonardo Fall Risk and appropriate interventions in the flowsheet.    Outcome: Progressing Towards Goal  Fall Risk Interventions:  Mobility Interventions: Bed/chair exit alarm, Assess mobility with egress test, Communicate number of staff needed for ambulation/transfer, OT consult for ADLs, Patient to call before getting OOB, PT Consult for assist device competence, Strengthening exercises (ROM-active/passive), PT Consult for mobility concerns, Utilize walker, cane, or other assitive device, Utilize gait belt for transfers/ambulation, Mechanical lift         Medication Interventions: Assess postural VS orthostatic hypotension, Bed/chair exit alarm, Teach patient to arise slowly, Utilize gait belt for transfers/ambulation, Patient to call before getting OOB, Evaluate medications/consider consulting pharmacy    Elimination Interventions: Bed/chair exit alarm, Call light in reach, Elevated toilet seat, Patient to call for help with toileting needs, Toileting schedule/hourly rounds, Toilet paper/wipes in reach    History of Falls Interventions: Bed/chair exit alarm, Consult care management for discharge planning, Evaluate medications/consider consulting pharmacy, Door open when patient unattended, Utilize gait belt for transfer/ambulation

## 2018-06-30 NOTE — PROGRESS NOTES
3234 - Bedside and Verbal shift change report given to Ronald Lopez (oncoming nurse) by Pru RN (offgoing nurse). Report included the following information SBAR, Kardex, Intake/Output, MAR, Accordion, Recent Results and Cardiac Rhythm NSR.     1025 - Bahena catheter removed for voiding trial. Due to void 1625.    1316 - Pt c/o another loose BM, asking for medicine to help slow it down. Pt currently on abx and usually reacts this way to abx. Dr. Haseeb Dempsey notified. Order for probiotic BID and to test for C. Diff received. Pt placed on eneteric precautions pending result.

## 2018-07-01 VITALS
SYSTOLIC BLOOD PRESSURE: 150 MMHG | HEIGHT: 67 IN | HEART RATE: 100 BPM | DIASTOLIC BLOOD PRESSURE: 70 MMHG | OXYGEN SATURATION: 94 % | TEMPERATURE: 98 F | RESPIRATION RATE: 12 BRPM | WEIGHT: 243.6 LBS | BODY MASS INDEX: 38.24 KG/M2

## 2018-07-01 LAB
ALBUMIN SERPL-MCNC: 2.4 G/DL (ref 3.5–5)
ALBUMIN/GLOB SERPL: 0.7 {RATIO} (ref 1.1–2.2)
ALP SERPL-CCNC: 98 U/L (ref 45–117)
ALT SERPL-CCNC: 36 U/L (ref 12–78)
ANION GAP SERPL CALC-SCNC: 11 MMOL/L (ref 5–15)
AST SERPL-CCNC: 15 U/L (ref 15–37)
BASOPHILS # BLD: 0 K/UL (ref 0–0.1)
BASOPHILS NFR BLD: 0 % (ref 0–1)
BILIRUB SERPL-MCNC: 0.3 MG/DL (ref 0.2–1)
BUN SERPL-MCNC: 11 MG/DL (ref 6–20)
BUN/CREAT SERPL: 11 (ref 12–20)
CALCIUM SERPL-MCNC: 8.5 MG/DL (ref 8.5–10.1)
CHLORIDE SERPL-SCNC: 108 MMOL/L (ref 97–108)
CO2 SERPL-SCNC: 24 MMOL/L (ref 21–32)
CREAT SERPL-MCNC: 0.97 MG/DL (ref 0.55–1.02)
DIFFERENTIAL METHOD BLD: ABNORMAL
EOSINOPHIL # BLD: 0 K/UL (ref 0–0.4)
EOSINOPHIL NFR BLD: 1 % (ref 0–7)
ERYTHROCYTE [DISTWIDTH] IN BLOOD BY AUTOMATED COUNT: 15.9 % (ref 11.5–14.5)
GLOBULIN SER CALC-MCNC: 3.3 G/DL (ref 2–4)
GLUCOSE BLD STRIP.AUTO-MCNC: 85 MG/DL (ref 65–100)
GLUCOSE SERPL-MCNC: 104 MG/DL (ref 65–100)
HCT VFR BLD AUTO: 28.7 % (ref 35–47)
HGB BLD-MCNC: 8.5 G/DL (ref 11.5–16)
IMM GRANULOCYTES # BLD: 0 K/UL (ref 0–0.04)
IMM GRANULOCYTES NFR BLD AUTO: 1 % (ref 0–0.5)
LYMPHOCYTES # BLD: 1.7 K/UL (ref 0.8–3.5)
LYMPHOCYTES NFR BLD: 25 % (ref 12–49)
MCH RBC QN AUTO: 26.6 PG (ref 26–34)
MCHC RBC AUTO-ENTMCNC: 29.6 G/DL (ref 30–36.5)
MCV RBC AUTO: 89.7 FL (ref 80–99)
MONOCYTES # BLD: 0.6 K/UL (ref 0–1)
MONOCYTES NFR BLD: 9 % (ref 5–13)
NEUTS SEG # BLD: 4.3 K/UL (ref 1.8–8)
NEUTS SEG NFR BLD: 65 % (ref 32–75)
NRBC # BLD: 0 K/UL (ref 0–0.01)
NRBC BLD-RTO: 0 PER 100 WBC
PLATELET # BLD AUTO: 231 K/UL (ref 150–400)
PMV BLD AUTO: 9.9 FL (ref 8.9–12.9)
POTASSIUM SERPL-SCNC: 3.4 MMOL/L (ref 3.5–5.1)
PROT SERPL-MCNC: 5.7 G/DL (ref 6.4–8.2)
RBC # BLD AUTO: 3.2 M/UL (ref 3.8–5.2)
SERVICE CMNT-IMP: NORMAL
SODIUM SERPL-SCNC: 143 MMOL/L (ref 136–145)
WBC # BLD AUTO: 6.6 K/UL (ref 3.6–11)

## 2018-07-01 PROCEDURE — 36415 COLL VENOUS BLD VENIPUNCTURE: CPT | Performed by: FAMILY MEDICINE

## 2018-07-01 PROCEDURE — 74011250636 HC RX REV CODE- 250/636: Performed by: FAMILY MEDICINE

## 2018-07-01 PROCEDURE — 74011250637 HC RX REV CODE- 250/637: Performed by: FAMILY MEDICINE

## 2018-07-01 PROCEDURE — 77010033678 HC OXYGEN DAILY

## 2018-07-01 PROCEDURE — 94760 N-INVAS EAR/PLS OXIMETRY 1: CPT

## 2018-07-01 PROCEDURE — 74011000250 HC RX REV CODE- 250: Performed by: INTERNAL MEDICINE

## 2018-07-01 PROCEDURE — 85025 COMPLETE CBC W/AUTO DIFF WBC: CPT | Performed by: FAMILY MEDICINE

## 2018-07-01 PROCEDURE — 74011636637 HC RX REV CODE- 636/637: Performed by: INTERNAL MEDICINE

## 2018-07-01 PROCEDURE — 82962 GLUCOSE BLOOD TEST: CPT

## 2018-07-01 PROCEDURE — 80053 COMPREHEN METABOLIC PANEL: CPT | Performed by: FAMILY MEDICINE

## 2018-07-01 PROCEDURE — 74011250637 HC RX REV CODE- 250/637: Performed by: INTERNAL MEDICINE

## 2018-07-01 PROCEDURE — 94640 AIRWAY INHALATION TREATMENT: CPT

## 2018-07-01 PROCEDURE — 74011000258 HC RX REV CODE- 258: Performed by: FAMILY MEDICINE

## 2018-07-01 RX ORDER — CEFDINIR 300 MG/1
300 CAPSULE ORAL 2 TIMES DAILY
Qty: 20 CAP | Refills: 0 | Status: SHIPPED | OUTPATIENT
Start: 2018-07-01 | End: 2018-07-11

## 2018-07-01 RX ADMIN — PANTOPRAZOLE SODIUM 40 MG: 40 TABLET, DELAYED RELEASE ORAL at 09:48

## 2018-07-01 RX ADMIN — BUDESONIDE 500 MCG: 0.5 INHALANT RESPIRATORY (INHALATION) at 07:04

## 2018-07-01 RX ADMIN — CEFTRIAXONE SODIUM 1 G: 1 INJECTION, POWDER, FOR SOLUTION INTRAMUSCULAR; INTRAVENOUS at 09:48

## 2018-07-01 RX ADMIN — ASPIRIN 81 MG 81 MG: 81 TABLET ORAL at 09:49

## 2018-07-01 RX ADMIN — ARFORMOTEROL TARTRATE 15 MCG: 15 SOLUTION RESPIRATORY (INHALATION) at 07:04

## 2018-07-01 RX ADMIN — CARVEDILOL 6.25 MG: 6.25 TABLET, FILM COATED ORAL at 09:49

## 2018-07-01 RX ADMIN — POTASSIUM CHLORIDE 20 MEQ: 750 TABLET, EXTENDED RELEASE ORAL at 09:48

## 2018-07-01 RX ADMIN — DULOXETINE HYDROCHLORIDE 60 MG: 30 CAPSULE, DELAYED RELEASE ORAL at 09:50

## 2018-07-01 RX ADMIN — PREDNISONE 20 MG: 20 TABLET ORAL at 09:49

## 2018-07-01 RX ADMIN — CLOPIDOGREL BISULFATE 75 MG: 75 TABLET ORAL at 09:49

## 2018-07-01 RX ADMIN — Medication 1 CAPSULE: at 08:48

## 2018-07-01 NOTE — DISCHARGE SUMMARY
Physician Discharge Summary     Patient ID:    Elaina Saenz  [id number]  81 y.o.  1945  Soy Busch MD    Admit date: 6/27/2018    Discharge date and time: 7/1/2018    Admission Diagnoses: Chest pain    Chronic Diagnoses:    Problem List as of 7/1/2018  Date Reviewed: 6/27/2018          Codes Class Noted - Resolved    * (Principal)Chest pain ICD-10-CM: R07.9  ICD-9-CM: 786.50  6/27/2018 - Present        Generalized abdominal pain ICD-10-CM: R10.84  ICD-9-CM: 789.07  6/27/2018 - Present        Recurrent deep vein thrombosis (DVT) ([facility] ICD-10-CM: I82.409  ICD-9-CM: 453.40  3/30/2018 - Present        Chronic anticoagulation ICD-10-CM: Z79.01  ICD-9-CM: V58.61  3/30/2018 - Present        Coronary artery disease involving native coronary artery without angina pectoris ICD-10-CM: I25.10  ICD-9-CM: 414.01  1/22/2016 - Present        Essential hypertension ICD-10-CM: I10  ICD-9-CM: 401.9  1/22/2016 - Present        Type II diabetes mellitus ([facility] (Chronic) ICD-10-CM: E11.9  ICD-9-CM: 250.00  10/9/2015 - Present        NSTEMI (non-ST elevated myocardial infarction) ([facility] ICD-10-CM: I21.4  ICD-9-CM: 410.70  10/9/2015 - Present        RESOLVED: CAD (coronary artery disease) ICD-10-CM: I25.10  ICD-9-CM: 414.00  10/9/2015 - 1/22/2016        RESOLVED: HTN (hypertension) ICD-10-CM: I10  ICD-9-CM: 401.9  10/9/2015 - 1/22/2016              Discharge Medications:   Current Discharge Medication List      START taking these medications    Details   cefdinir (OMNICEF) 300 mg capsule Take 1 Cap by mouth two (2) times a day for 10 days. Qty: 20 Cap, Refills: 0         CONTINUE these medications which have NOT CHANGED    Details   clopidogrel (PLAVIX) 75 mg tab Take 75 mg by mouth daily. rivaroxaban (XARELTO) 15 mg tab tablet Take 15 mg by mouth daily. aspirin 81 mg chewable tablet Take 81 mg by mouth daily.  For 1 month, started 6/7/18      lactobacillus rhamnosus gg 10 billion cell (CULTURELLE) 10 billion cell capsule Take 1 Cap by mouth daily. promethazine (PHENERGAN) 25 mg tablet Take 25 mg by mouth every six (6) hours as needed for Nausea. magnesium oxide (MAG-OX) 400 mg tablet Take 400 mg by mouth two (2) times a day. ondansetron (ZOFRAN ODT) 4 mg disintegrating tablet Take 4 mg by mouth every eight (8) hours as needed for Nausea. HYDROcodone-acetaminophen (NORCO) 5-325 mg per tablet Take 1 Tab by mouth every four (4) hours as needed for Pain. acetaminophen (TYLENOL) 325 mg tablet Take 650 mg by mouth every six (6) hours as needed for Pain. loperamide (IMMODIUM) 2 mg tablet Take 2-4 mg by mouth as needed for Diarrhea. Give 2 tablets by mouth after the 1st loose stool and 1 tablet every 8 hours as needed for diarrhea, not to exceed 16 mg in 24 hours      potassium chloride (K-DUR, KLOR-CON) 20 mEq tablet Take 20 mEq by mouth daily (with dinner). pantoprazole (PROTONIX) 40 mg tablet Take 40 mg by mouth daily. psyllium (METAMUCIL) powd Take  by mouth daily. 2 teaspoons      carvedilol (COREG) 6.25 mg tablet Take 6.25 mg by mouth two (2) times a day. lisinopril (PRINIVIL, ZESTRIL) 5 mg tablet TAKE 1 TABLET BY MOUTH DAILY  Qty: 90 Tab, Refills: 3    Associated Diagnoses: Essential hypertension with goal blood pressure less than 130/85      biotin 10,000 mcg cap Take 1 Cap by mouth daily. saxagliptin (ONGLYZA) 5 mg tab tablet Take 5 mg by mouth daily. Takes at 1630      DULoxetine (CYMBALTA) 60 mg capsule Take 60 mg by mouth daily. cholecalciferol (VITAMIN D3) 1,000 unit tablet Take 1,000 Units by mouth daily. azelastine-fluticasone (DYMISTA) 137-50 mcg/spray spry 1 Spray by Nasal route two (2) times daily as needed (allergies). mometasone-formoterol (DULERA) 200-5 mcg/actuation HFA inhaler Take 2 Puffs by inhalation two (2) times a day. predniSONE (DELTASONE) 10 mg tablet Take 20 mg by mouth daily. Follow up Care:    1.  Roula Loyd MD with in 1 weeks  2. Cardiology specialists as directed. Diet:  Diabetic Diet    Disposition:  Home. Advanced Directive:    Discharge Exam:  [See today's progress note.]    CONSULTATIONS: Cardiology    Significant Diagnostic Studies:   Recent Labs      07/01/18 0146 06/30/18   0307   WBC  6.6  6.6   HGB  8.5*  8.2*   HCT  28.7*  26.5*   PLT  231  204     Recent Labs      07/01/18 0146 06/30/18   0307   NA  143  142   K  3.4*  3.2*   CL  108  108   CO2  24  24   BUN  11  11   CREA  0.97  0.98   GLU  104*  103*   CA  8.5  8.3*   MG   --   1.7     Recent Labs      07/01/18 0146 06/30/18   0307   SGOT  15  16   ALT  36  38   AP  98  98   TBILI  0.3  0.3   TP  5.7*  5.5*   ALB  2.4*  2.1*   GLOB  3.3  3.4       Lab Results   Component Value Date/Time    Glucose (POC) 85 07/01/2018 07:18 AM    Glucose (POC) 166 (H) 06/30/2018 09:04 PM    Glucose (POC) 198 (H) 06/30/2018 04:42 PM    Glucose (POC) 124 (H) 06/30/2018 11:50 AM    Glucose (POC) 101 (H) 06/30/2018 07:17 AM     Culture result:    Preliminary      ENTEROBACTER AEROGENES GROWING IN 1 OF 4 BOTTLES DRAWN (SITE=L UPPER ARM) (A)     Culture result:    Preliminary     PRELIMINARY REPORT OF   GRAM NEGATIVE COCCOBACILLI   GROWING IN 1 OF 4 BOTTLES DRAWN   CALLED TO AND READ BACK BY   CHANG HUNTLEY RN [facility] AT 6020 ON 6/28/2018.  [facility]    (A)     Culture result:    Preliminary     REMAINING BOTTLE(S) HAS/HAVE NO GROWTH SO FAR     Culture result:    Final      ESCHERICHIA COLI (PREDOMINATING) (A)     Culture result: ENTEROBACTER AEROGENES (A)   Final       Culture & Susceptibility        Antibiotic   Organism Organism Organism       Enterobacter aerogenes Escherichia coli      AMIKACIN ($)   <=16   ug/mL   S Final  <=16   ug/mL   S Final       AMPICILLIN ($)     <=8   ug/mL   S Final       AMPICILLIN/SULBACTAM ($)     <=8/4   ug/mL   S Final       AZTREONAM ($$$$)   16   ug/mL   I Final  <=4   ug/mL   S Final       CEFAZOLIN ($)     <=8   ug/mL   S Final       CEFEPIME ($$)   <=4   ug/mL   S Final  <=4   ug/mL   S Final       CEFOTAXIME   16   ug/mL   I Final  <=2   ug/mL   S Final       CEFTAZIDIME ($)   16   ug/mL   I Final  <=1   ug/mL   S Final       CEFTRIAXONE ($)   32   ug/mL   I Final  <=1   ug/mL   S Final       CEFUROXIME ($)     <=4   ug/mL   S Final       CIPROFLOXACIN ($)   <=1   ug/mL   S Final  <=1   ug/mL   S Final       GENTAMICIN ($)   <=4   ug/mL   S Final  <=4   ug/mL   S Final       IMIPENEM   <=1   ug/mL   S Final  <=1   ug/mL   S Final       LEVOFLOXACIN ($)   <=2   ug/mL   S Final  <=2   ug/mL   S Final       MEROPENEM ($$)   <=1   ug/mL   S Final  <=1   ug/mL   S Final       NITROFURANTOIN   64   ug/mL   I Final  <=32   ug/mL   S Final       PIPERACILLIN/TAZOBAC ($)   <=16   ug/mL   S Final  <=16   ug/mL   S Final       TOBRAMYCIN ($)   <=4   ug/mL   S Final  <=4   ug/mL   S Final       TRIMETH/SULFA   <=2/38   ug/mL   S Final  <=2/38   ug/mL   S Final                            HOSPITAL COURSE & TREATMENT RENDERED:   Chest pain (6/27/2018) / CAD:  Unclear etiology.  However given hx of recent LAD stent, concern that this could be cardiac.  Less likely PE as on Xarelto.  No widened mediastinum on CXR. Given ASA and GI cocktail in ED.                         -- CT chest neg for PE                        -- trend troponin                        -- cardiology consulted                        -- continue DAPT                        -- continue BB                        -- holding ACE for NAIDA      Abdominal pain:  Patient reports having abdominal pains with prior NSTEMI.  This may be anginal equivalent.                        -- CT abd/pelvis w/o contrast neg                        -- morphine prn for pain      Type II diabetes mellitus ([facility] (10/9/2015):                        -- holding saxagliptin                        -- add SSI alone for now      Essential hypertension (1/22/2016):                        -- continue coreg                        -- hold lisinopril for now      Recurrent deep vein thrombosis (DVT) ([facility] (3/30/2018):  On chronic Xarelto.                        --Cardiology notes recommends holding xarelto and using lovenox.  Will stop xarelto (last dose was 6/26 am) and start lovenox 1mg/kg q12h.     NAIDA:  May be due to IVVD. [address] creatinine better after IVF.                       -- obtain records from [address] [place] [address]                        -- continue IVF for now                        -- monitor renal function                        -- hold ACE      Leukocytosis:  Was elevated in 4/2017 as well.                         -- compare with records at [address] [place] [address]                        -- UA positive- BC positive 1/4                        -- added Rocephin                        -- lactic acid normal      UTI- ecoli with bacteremia                       --Will discharge on Omnicef for 10 days as she is allergic to Quinolone and sulfa          HPI:     Ms. Jalloh is a 81 y.o.   female who is admitted with Chest pain.  Ms. Jalloh presented to the Emergency Department today complaining of tightness of abdomen and chest.  This woke her up from sleep at 1:30 AM.  No alleviating or aggravating factors.  Associated with nausea and SOB.  No fever, chills.  No vomiting.  No diarrhea.  No dysuria or hematuria.  Had stent to LAD earlier this month at [address] [place] [address] after 3 week stay at [facility] for pneumonia.  Patient reports current symptoms feel similar to symptoms at time of NSTEMI in 2015. (Dr Cate Gleason)     6/28/18: Febrile overnight and tachycardic. UA positive so will start Rocephin until we have cultures. Did not sleep well last night. WBC is better. Cardiology consult is pending. ECHO has been ordered.      6/29/18: Feeling much better. AM labs are pending. BC positive 1/4. UC positive.      6/30/18: Improving. Will keep on IV antibiotics another day      7/1/18: She is doing well. Afebrile. WBC normal. BC 1/4 positive so will need 10 days of antibiotics.  Will send home on Omnicef  as she is allergic to Cipro and Bactrim and has tolerated Rocephin well     Review of Systems:   A comprehensive review of systems was negative except for that written in the HPI.     Objective:   Physical Exam:           Visit Vitals    BP (!) 149/91 (BP 1 Location: Left arm, BP Patient Position: At rest)    Pulse 76    Temp 98.2 °F (36.8 °C)    Resp 16    Ht 5' 6.75\" (1.695 m)    Wt 243 lb 9.6 oz (110.5 kg)    SpO2 97%    Breastfeeding No    BMI 38.44 kg/m2    O2 Flow Rate (L/min): 2.5 l/min O2 Device: Room air     Temp (24hrs), Av.3 °F (36.8 °C), Min:98 °F (36.7 °C), Max:98.5 °F (36.9 °C)         1901 -  0700  In: 1550 [P.O.:1500; I.V.:50]  Out: 3576 [Urine:3575]     General:  Alert, cooperative, no distress, appears stated age. Head:  Normocephalic, without obvious abnormality, atraumatic. Eyes:  Conjunctivae/corneas clear. PERRL, EOMs intact. Nose: Nares normal. Septum midline. Mucosa normal. No drainage or sinus tenderness. Throat: Lips, mucosa, and tongue normal. Teeth and gums normal.   Neck: Supple, symmetrical, trachea midline, no adenopathy, thyroid: no enlargement/tenderness/nodules, no carotid bruit and no JVD. Back:   Symmetric, no curvature. ROM normal. No CVA tenderness. Lungs:   Clear to auscultation bilaterally. Diminished breath sounds   Chest wall:  No tenderness or deformity. Heart:  Regular rate and rhythm, S1, S2 normal, no murmur, click, rub or gallop. Abdomen:   Soft, non-tender. Bowel sounds normal. No masses,  No organomegaly. Extremities: Extremities normal, atraumatic, no cyanosis or edema. No calf tenderness or cords. Pulses: 2+ and symmetric all extremities. Skin: Skin color, texture, turgor normal. No rashes or lesions   Neurologic: CNII-XII intact. Alert and oriented X 3. Fine motor of hands and fingers normal.   equal.  No cogwheeling or rigidity. Gait not tested at this time.   Sensation grossly normal to touch.  Gross motor of extremities normal.             Signed:  Grecia Jaimes MD  7/1/2018  9:57 AM

## 2018-07-01 NOTE — DISCHARGE INSTRUCTIONS
Patient Discharge Instructions    Judge Ramirez / [id number] : 1945    Admitted 2018 Discharged: 2018 9:57 AM     ACUTE DIAGNOSES:  Chest pain    CHRONIC MEDICAL DIAGNOSES:  Problem List as of 2018  Date Reviewed: 2018          Codes Class Noted - Resolved    * (Principal)Chest pain ICD-10-CM: R07.9  ICD-9-CM: 786.50  2018 - Present        Generalized abdominal pain ICD-10-CM: R10.84  ICD-9-CM: 789.07  2018 - Present        Recurrent deep vein thrombosis (DVT) ([facility] ICD-10-CM: I82.409  ICD-9-CM: 453.40  3/30/2018 - Present        Chronic anticoagulation ICD-10-CM: Z79.01  ICD-9-CM: V58.61  3/30/2018 - Present        Coronary artery disease involving native coronary artery without angina pectoris ICD-10-CM: I25.10  ICD-9-CM: 414.01  2016 - Present        Essential hypertension ICD-10-CM: I10  ICD-9-CM: 401.9  2016 - Present        Type II diabetes mellitus ([facility] (Chronic) ICD-10-CM: E11.9  ICD-9-CM: 250.00  10/9/2015 - Present        NSTEMI (non-ST elevated myocardial infarction) ([facility] ICD-10-CM: I21.4  ICD-9-CM: 410.70  10/9/2015 - Present        RESOLVED: CAD (coronary artery disease) ICD-10-CM: I25.10  ICD-9-CM: 414.00  10/9/2015 - 2016        RESOLVED: HTN (hypertension) ICD-10-CM: I10  ICD-9-CM: 401.9  10/9/2015 - 2016              DISCHARGE MEDICATIONS:         · It is important that you take the medication exactly as they are prescribed. · Keep your medication in the bottles provided by the pharmacist and keep a list of the medication names, dosages, and times to be taken in your wallet. · Do not take other medications without consulting your doctor.        DIET:  Diabetic Diet    ACTIVITY: Activity as tolerated    ADDITIONAL INFORMATION: If you experience any of the following symptoms then please call your primary care physician or return to the emergency room if you cannot get hold of your doctor: Fever, chills, nausea, vomiting, diarrhea, change in mentation, falling, bleeding, shortness of breath. FOLLOW UP CARE:  Dr. Tess Metz MD  you are to call and set up an appointment to see them with in 1 week. Follow-up with specialists at directed by them      Information obtained by :  I understand that if any problems occur once I am at home I am to contact my physician. I understand and acknowledge receipt of the instructions indicated above.                                                                                                                                            Physician's or R.N.'s Signature                                                                  Date/Time                                                                                                                                              Patient or Representative Signature                                                          Date/Time  Nothing to eat or drink 2 hours prior to the stress test on July 17, 2018 at 830 am  NO CAFFEINE 12 hours prior to the stress test (coffee, soda, tea, chocolate)  No Tobacco products 6 hours prior to the stress test  Wear comfortable clothes and shoes

## 2018-07-01 NOTE — PROGRESS NOTES
Daily Progress Note: 7/1/2018  Danyelle Kelley MD    Assessment/Plan:   Chest pain (6/27/2018) / CAD:  Unclear etiology. However given hx of recent LAD stent, concern that this could be cardiac. Less likely PE as on Xarelto. No widened mediastinum on CXR. Given ASA and GI cocktail in ED.                        -- CT chest neg for PE                        -- trend troponin                        -- cardiology consulted                        -- continue DAPT                        -- continue BB                        -- holding ACE for NAIDA     Abdominal pain:  Patient reports having abdominal pains with prior NSTEMI. This may be anginal equivalent. -- CT abd/pelvis w/o contrast neg                        -- morphine prn for pain     Type II diabetes mellitus ([facility] (10/9/2015):                        -- holding saxagliptin                        -- add SSI alone for now     Essential hypertension (1/22/2016):                        -- continue coreg                        -- hold lisinopril for now     Recurrent deep vein thrombosis (DVT) ([facility] (3/30/2018): On chronic Xarelto. --Cardiology notes recommends holding xarelto and using lovenox. Will stop xarelto (last dose was 6/26 am) and start lovenox 1mg/kg q12h.    NAIDA:  May be due to IVVD. POC creatinine better after IVF. -- obtain records from [facility]                        -- continue IVF for now                        -- monitor renal function                        -- hold ACE     Leukocytosis:  Was elevated in 4/2017 as well.                          -- compare with records at [facility]                        -- UA positive- BC positive 1/4                        -- added Rocephin                        -- lactic acid normal     UTI- ecoli with bacteremia                       --Will discharge on Omnicef for 10 days as she is allergic to Quinolone and sulfa     Problem List:  Problem List as of 7/1/2018  Date Reviewed: 6/27/2018          Codes Class Noted - Resolved    * (Principal)Chest pain ICD-10-CM: R07.9  ICD-9-CM: 786.50  6/27/2018 - Present        Generalized abdominal pain ICD-10-CM: R10.84  ICD-9-CM: 789.07  6/27/2018 - Present        Recurrent deep vein thrombosis (DVT) ([facility] ICD-10-CM: I82.409  ICD-9-CM: 453.40  3/30/2018 - Present        Chronic anticoagulation ICD-10-CM: Z79.01  ICD-9-CM: V58.61  3/30/2018 - Present        Coronary artery disease involving native coronary artery without angina pectoris ICD-10-CM: I25.10  ICD-9-CM: 414.01  1/22/2016 - Present        Essential hypertension ICD-10-CM: I10  ICD-9-CM: 401.9  1/22/2016 - Present        Type II diabetes mellitus ([facility] (Chronic) ICD-10-CM: E11.9  ICD-9-CM: 250.00  10/9/2015 - Present        NSTEMI (non-ST elevated myocardial infarction) ([facility] ICD-10-CM: I21.4  ICD-9-CM: 410.70  10/9/2015 - Present        RESOLVED: CAD (coronary artery disease) ICD-10-CM: I25.10  ICD-9-CM: 414.00  10/9/2015 - 1/22/2016        RESOLVED: HTN (hypertension) ICD-10-CM: I10  ICD-9-CM: 401.9  10/9/2015 - 1/22/2016              HPI:     Ms. Matthias Al is a 81 y.o.  female who is admitted with Chest pain. Ms. Matthias Al presented to the Emergency Department today complaining of tightness of abdomen and chest.  This woke her up from sleep at 1:30 AM.  No alleviating or aggravating factors. Associated with nausea and SOB. No fever, chills. No vomiting. No diarrhea. No dysuria or hematuria. Had stent to LAD earlier this month at [address] [place] [address] after 3 week stay at [facility] for pneumonia. Patient reports current symptoms feel similar to symptoms at time of NSTEMI in 2015. (Dr Bruna Montes)    6/28/18: Febrile overnight and tachycardic. UA positive so will start Rocephin until we have cultures. Did not sleep well last night. WBC is better. Cardiology consult is pending. ECHO has been ordered. 6/29/18: Feeling much better.  AM labs are pending. BC positive . UC positive. 18: Improving. Will keep on IV antibiotics another day     18: She is doing well. Afebrile. WBC normal. BC  positive so will need 10 days of antibiotics. Will send home on Omnicef  as she is allergic to Cipro and Bactrim and has tolerated Rocephin well    Review of Systems:   A comprehensive review of systems was negative except for that written in the HPI. Objective:   Physical Exam:     Visit Vitals    BP (!) 149/91 (BP 1 Location: Left arm, BP Patient Position: At rest)    Pulse 76    Temp 98.2 °F (36.8 °C)    Resp 16    Ht 5' 6.75\" (1.695 m)    Wt 243 lb 9.6 oz (110.5 kg)    SpO2 97%    Breastfeeding No    BMI 38.44 kg/m2    O2 Flow Rate (L/min): 2.5 l/min O2 Device: Room air    Temp (24hrs), Av.3 °F (36.8 °C), Min:98 °F (36.7 °C), Max:98.5 °F (36.9 °C)         1901 -  0700  In: 6347 [P.O.:1500; I.V.:50]  Out: 3576 [Urine:3575]    General:  Alert, cooperative, no distress, appears stated age. Head:  Normocephalic, without obvious abnormality, atraumatic. Eyes:  Conjunctivae/corneas clear. PERRL, EOMs intact. Nose: Nares normal. Septum midline. Mucosa normal. No drainage or sinus tenderness. Throat: Lips, mucosa, and tongue normal. Teeth and gums normal.   Neck: Supple, symmetrical, trachea midline, no adenopathy, thyroid: no enlargement/tenderness/nodules, no carotid bruit and no JVD. Back:   Symmetric, no curvature. ROM normal. No CVA tenderness. Lungs:   Clear to auscultation bilaterally. Diminished breath sounds   Chest wall:  No tenderness or deformity. Heart:  Regular rate and rhythm, S1, S2 normal, no murmur, click, rub or gallop. Abdomen:   Soft, non-tender. Bowel sounds normal. No masses,  No organomegaly. Extremities: Extremities normal, atraumatic, no cyanosis or edema. No calf tenderness or cords. Pulses: 2+ and symmetric all extremities.    Skin: Skin color, texture, turgor normal. No rashes or lesions   Neurologic: CNII-XII intact. Alert and oriented X 3. Fine motor of hands and fingers normal.   equal.  No cogwheeling or rigidity. Gait not tested at this time. Sensation grossly normal to touch. Gross motor of extremities normal.       Data Review:       Recent Days:  Recent Labs      07/01/18   0146  06/30/18   0307   WBC  6.6  6.6   HGB  8.5*  8.2*   HCT  28.7*  26.5*   PLT  231  204     Recent Labs      07/01/18   0146  06/30/18   0307   NA  143  142   K  3.4*  3.2*   CL  108  108   CO2  24  24   GLU  104*  103*   BUN  11  11   CREA  0.97  0.98   CA  8.5  8.3*   MG   --   1.7   ALB  2.4*  2.1*   TBILI  0.3  0.3   SGOT  15  16   ALT  36  38     No results for input(s): PH, PCO2, PO2, HCO3, FIO2 in the last 72 hours. 24 Hour Results:  Recent Results (from the past 24 hour(s))   GLUCOSE, POC    Collection Time: 06/30/18 11:50 AM   Result Value Ref Range    Glucose (POC) 124 (H) 65 - 100 mg/dL    Performed by Zarina Nava    GLUCOSE, POC    Collection Time: 06/30/18  4:42 PM   Result Value Ref Range    Glucose (POC) 198 (H) 65 - 100 mg/dL    Performed by Gricel Chinchilla (PCT)    GLUCOSE, POC    Collection Time: 06/30/18  9:04 PM   Result Value Ref Range    Glucose (POC) 166 (H) 65 - 100 mg/dL    Performed by Gricel Chinchilla (PCT)    CBC WITH AUTOMATED DIFF    Collection Time: 07/01/18  1:46 AM   Result Value Ref Range    WBC 6.6 3.6 - 11.0 K/uL    RBC 3.20 (L) 3.80 - 5.20 M/uL    HGB 8.5 (L) 11.5 - 16.0 g/dL    HCT 28.7 (L) 35.0 - 47.0 %    MCV 89.7 80.0 - 99.0 FL    MCH 26.6 26.0 - 34.0 PG    MCHC 29.6 (L) 30.0 - 36.5 g/dL    RDW 15.9 (H) 11.5 - 14.5 %    PLATELET 046 758 - 577 K/uL    MPV 9.9 8.9 - 12.9 FL    NRBC 0.0 0  WBC    ABSOLUTE NRBC 0.00 0.00 - 0.01 K/uL    NEUTROPHILS 65 32 - 75 %    LYMPHOCYTES 25 12 - 49 %    MONOCYTES 9 5 - 13 %    EOSINOPHILS 1 0 - 7 %    BASOPHILS 0 0 - 1 %    IMMATURE GRANULOCYTES 1 (H) 0.0 - 0.5 %    ABS. NEUTROPHILS 4.3 1.8 - 8.0 K/UL    ABS. LYMPHOCYTES 1.7 0.8 - 3.5 K/UL    ABS. MONOCYTES 0.6 0.0 - 1.0 K/UL    ABS. EOSINOPHILS 0.0 0.0 - 0.4 K/UL    ABS. BASOPHILS 0.0 0.0 - 0.1 K/UL    ABS. IMM. GRANS. 0.0 0.00 - 0.04 K/UL    DF AUTOMATED     METABOLIC PANEL, COMPREHENSIVE    Collection Time: 07/01/18  1:46 AM   Result Value Ref Range    Sodium 143 136 - 145 mmol/L    Potassium 3.4 (L) 3.5 - 5.1 mmol/L    Chloride 108 97 - 108 mmol/L    CO2 24 21 - 32 mmol/L    Anion gap 11 5 - 15 mmol/L    Glucose 104 (H) 65 - 100 mg/dL    BUN 11 6 - 20 MG/DL    Creatinine 0.97 0.55 - 1.02 MG/DL    BUN/Creatinine ratio 11 (L) 12 - 20      GFR est AA >60 >60 ml/min/1.73m2    GFR est non-AA 57 (L) >60 ml/min/1.73m2    Calcium 8.5 8.5 - 10.1 MG/DL    Bilirubin, total 0.3 0.2 - 1.0 MG/DL    ALT (SGPT) 36 12 - 78 U/L    AST (SGOT) 15 15 - 37 U/L    Alk.  phosphatase 98 45 - 117 U/L    Protein, total 5.7 (L) 6.4 - 8.2 g/dL    Albumin 2.4 (L) 3.5 - 5.0 g/dL    Globulin 3.3 2.0 - 4.0 g/dL    A-G Ratio 0.7 (L) 1.1 - 2.2     GLUCOSE, POC    Collection Time: 07/01/18  7:18 AM   Result Value Ref Range    Glucose (POC) 85 65 - 100 mg/dL    Performed by Saleem Prabhakar        Medications reviewed  Current Facility-Administered Medications   Medication Dose Route Frequency    potassium chloride SR (KLOR-CON 10) tablet 20 mEq  20 mEq Oral BID    lactobac ac& pc-s.therm-b.anim (NUBIA Q/RISAQUAD)  1 Cap Oral BID    rivaroxaban (XARELTO) tablet 15 mg  15 mg Oral DAILY WITH DINNER    promethazine (PHENERGAN) 12.5 mg in NS 50 mL IVPB  12.5 mg IntraVENous Q6H PRN    cefTRIAXone (ROCEPHIN) 1 g in 0.9% sodium chloride (MBP/ADV) 50 mL  1 g IntraVENous Q24H    sodium chloride (NS) flush 5-10 mL  5-10 mL IntraVENous Q8H    sodium chloride (NS) flush 5-10 mL  5-10 mL IntraVENous PRN    acetaminophen (TYLENOL) tablet 650 mg  650 mg Oral Q4H PRN    insulin lispro (HUMALOG) injection   SubCUTAneous AC&HS    glucose chewable tablet 16 g  4 Tab Oral PRN    dextrose (D50W) injection syrg 12.5-25 g  12.5-25 g IntraVENous PRN    glucagon (GLUCAGEN) injection 1 mg  1 mg IntraMUSCular PRN    morphine injection 2 mg  2 mg IntraVENous Q4H PRN    aspirin chewable tablet 81 mg  81 mg Oral DAILY    carvedilol (COREG) tablet 6.25 mg  6.25 mg Oral BID    clopidogrel (PLAVIX) tablet 75 mg  75 mg Oral DAILY    pantoprazole (PROTONIX) tablet 40 mg  40 mg Oral DAILY    DULoxetine (CYMBALTA) capsule 60 mg  60 mg Oral DAILY    predniSONE (DELTASONE) tablet 20 mg  20 mg Oral DAILY    budesonide (PULMICORT) 500 mcg/2 ml nebulizer suspension  500 mcg Nebulization BID RT    arformoterol (BROVANA) neb solution 15 mcg  15 mcg Nebulization BID RT    albuterol-ipratropium (DUO-NEB) 2.5 MG-0.5 MG/3 ML  3 mL Nebulization Q4H PRN    ondansetron (ZOFRAN) injection 4 mg  4 mg IntraVENous Q6H PRN       Care Plan discussed with: Patient/Family and Consultant Cardiology    Total time spent with patient and review of records: 30 minutes.     Peggy Rhodes MD

## 2018-07-01 NOTE — PROGRESS NOTES
0700- Bedside and Verbal shift change report given to [address] [place] [address]  (oncoming nurse) by PRU RN  (offgoing nurse). Report included the following information SBAR, Kardex and Recent Results. .    1000- MD @ BEDSIDE. Keena Verdugo DISCHARGED THE PT. .    1045-I have reviewed discharge instructions with the patient. The patient verbalized understanding. .  .

## 2018-07-02 NOTE — PROGRESS NOTES
7-2-2018 CASE MANAGEMENT NOTE:  The pt was discharged back to her independent living home at [facility] yesterday. Per request of [address] (seeing pt PTA) I sent a referral thru [employer] to them to resume home health RN,PT,OT and HHA services and confirmed receipt. Care Management Interventions  PCP Verified by CM:  Yes Digna Ordonez MD)  Palliative Care Criteria Met (RRAT>21 & CHF Dx)?: No  Mode of Transport at Discharge: Self (pt's DTR Ashley Lara [phone] will transport)  Transition of Care Consult (CM Consult): [address]: No  [facility] Patient already serviced by other home care/hospice agency  Discharge Durable Medical Equipment: No  Physical Therapy Consult: Yes  Occupational Therapy Consult: Yes  Speech Therapy Consult: No  Current Support Network: Own Home, Lives Alone  Confirm Follow Up Transport: Family  Plan discussed with Pt/Family/Caregiver: Yes  Discharge Location  Discharge Placement:  (Return to [place] at [address] with [address])    [address],

## 2018-07-04 LAB
BACTERIA SPEC CULT: ABNORMAL
SERVICE CMNT-IMP: ABNORMAL

## 2018-07-11 RX ORDER — CARVEDILOL 12.5 MG/1
TABLET ORAL
Qty: 60 TAB | Refills: 0 | Status: SHIPPED | OUTPATIENT
Start: 2018-07-11 | End: 2018-09-19 | Stop reason: SDUPTHER

## 2018-07-17 ENCOUNTER — CLINICAL SUPPORT (OUTPATIENT)
Dept: CARDIOLOGY CLINIC | Age: 73
End: 2018-07-17

## 2018-07-17 DIAGNOSIS — I25.10 CORONARY ARTERY DISEASE INVOLVING NATIVE CORONARY ARTERY OF NATIVE HEART WITHOUT ANGINA PECTORIS: ICD-10-CM

## 2018-07-17 DIAGNOSIS — I10 ESSENTIAL HYPERTENSION: ICD-10-CM

## 2018-07-17 DIAGNOSIS — R06.02 SOB (SHORTNESS OF BREATH): Primary | ICD-10-CM

## 2018-07-17 DIAGNOSIS — R07.9 CHEST PAIN, UNSPECIFIED TYPE: ICD-10-CM

## 2018-07-17 NOTE — PROGRESS NOTES
See scanned report. Dr. Rhonda Serrano ordered study and Dr. Rhonda Serrano read study. ID verified per protocol. Explained procedure to patient. Obtaining IV access, radiation exposure, risks and discomforts (for exercise- change to lexiwalk stress). , waiting between injection(s) and obtaining images. All concerns and questions addressed prior to obtaining consent test. Patient developed dissiness and headache during test. At 2 minutes in recovery, patient without symptoms or complaints voiced.

## 2018-08-31 ENCOUNTER — OFFICE VISIT (OUTPATIENT)
Dept: CARDIOLOGY CLINIC | Age: 73
End: 2018-08-31

## 2018-08-31 VITALS
DIASTOLIC BLOOD PRESSURE: 76 MMHG | HEART RATE: 112 BPM | RESPIRATION RATE: 18 BRPM | SYSTOLIC BLOOD PRESSURE: 120 MMHG | BODY MASS INDEX: 39.53 KG/M2 | WEIGHT: 246 LBS | OXYGEN SATURATION: 98 % | HEIGHT: 66 IN

## 2018-08-31 DIAGNOSIS — I82.409 RECURRENT DEEP VEIN THROMBOSIS (DVT) (HCC): ICD-10-CM

## 2018-08-31 DIAGNOSIS — Z79.01 CHRONIC ANTICOAGULATION: ICD-10-CM

## 2018-08-31 DIAGNOSIS — I10 ESSENTIAL HYPERTENSION: ICD-10-CM

## 2018-08-31 DIAGNOSIS — I25.10 CORONARY ARTERY DISEASE INVOLVING NATIVE CORONARY ARTERY OF NATIVE HEART WITHOUT ANGINA PECTORIS: Primary | ICD-10-CM

## 2018-08-31 RX ORDER — OMEPRAZOLE 20 MG/1
20 CAPSULE, DELAYED RELEASE ORAL 2 TIMES DAILY
COMMUNITY
End: 2019-01-05 | Stop reason: CLARIF

## 2018-08-31 RX ORDER — ALBUTEROL SULFATE 90 UG/1
2 AEROSOL, METERED RESPIRATORY (INHALATION)
COMMUNITY
Start: 2021-01-01 | End: 2021-01-01 | Stop reason: CLARIF

## 2018-08-31 NOTE — MR AVS SNAPSHOT
[address] [facility] [address] [facility] 
[phone] Patient: Denece Brunner MRN: [id number] :1945 Visit Information Date & Time Provider Department Dept. Phone Encounter #  
 2018  3:00 PM Naomy Cali MD [facility] Meka Longoria [phone] [id number] Your Appointments 10/1/2018  1:20 PM  
ESTABLISHED PATIENT with Naomy Cali MD  
[facility] ([address]) Appt Note: 6 mo fup  
 [address] [facility] 600 [address] [facility]  
[address] [facility] [address] [place] [address] [address] Upcoming Health Maintenance Date Due Hepatitis C Screening 1945 FOOT EXAM Q1 1957 MICROALBUMIN Q1 1957 EYE EXAM RETINAL OR DILATED Q1 1957 DTaP/Tdap/Td series (1 - Tdap) 1968 BREAST CANCER SCRN MAMMOGRAM 1997 FOBT Q 1 YEAR AGE 50-75 1997 ZOSTER VACCINE AGE 60> 10/23/2007 GLAUCOMA SCREENING Q2Y 2012 Bone Densitometry (Dexa) Screening 2012 Pneumococcal 65+ Low/Medium Risk (1 of 2 - PCV13) 2012 LIPID PANEL Q1 10/10/2016 MEDICARE YEARLY EXAM 3/14/2018 Influenza Age 5 to Adult 2018 HEMOGLOBIN A1C Q6M 2018 Allergies as of 2018  Review Complete On: 2018 By: Bg Basurto LPN Severity Noted Reaction Type Reactions Advair Diskus [Fluticasone-salmeterol]  10/09/2015    Rash Ciprofloxacin  10/09/2015    Rash Statins-hmg-coa Reductase Inhibitors  10/09/2015    Other (comments) Muscle pain Lipitor/crestor/zocor Sulfa (Sulfonamide Antibiotics)  10/09/2015    Rash Tetracycline  10/09/2015    Other (comments) musclepain Current Immunizations  Never Reviewed Name Date Influenza Vaccine (Madin Bouchra Canine Kidney) PF 10/10/2015 10:12 AM  
  
 Not reviewed this visit Vitals BP Pulse Resp Height(growth percentile) Weight(growth percentile) SpO2  
 120/76 (BP 1 Location: Right arm, BP Patient Position: Sitting) (!) 112 18 5' 6\" (1.676 m) 246 lb (111.6 kg) 98% BMI OB Status Smoking Status 39.71 kg/m2 Hysterectomy Former Smoker Vitals History BMI and BSA Data Body Mass Index Body Surface Area  
 39.71 kg/m 2 2.28 m 2 Preferred Pharmacy Pharmacy Name Phone [facility] [place], [facility] [address] [phone] Your Updated Medication List  
  
   
This list is accurate as of 8/31/18  3:53 PM.  Always use your most recent med list.  
  
  
  
  
 acetaminophen 325 mg tablet Commonly known as:  TYLENOL Take 650 mg by mouth every six (6) hours as needed for Pain. biotin 10,000 mcg Cap Take 1 Cap by mouth daily. * carvedilol 6.25 mg tablet Commonly known as:  Jinx Re Take 6.25 mg by mouth two (2) times a day. * carvedilol 12.5 mg tablet Commonly known as:  COREG  
TAKE 1 TABLET BY MOUTH TWICE DAILY WITH MEALS  
  
 CULTURELLE 10 billion cell capsule Generic drug:  lactobacillus rhamnosus gg 10 billion cell Take 1 Cap by mouth daily. DULERA 200-5 mcg/actuation HFA inhaler Generic drug:  mometasone-formoterol Take 2 Puffs by inhalation two (2) times a day. DULoxetine 60 mg capsule Commonly known as:  CYMBALTA Take 60 mg by mouth daily. DYMISTA 137-50 mcg/spray [place] Generic drug:  azelastine-fluticasone 1 Spray by Nasal route two (2) times daily as needed (allergies). HYDROcodone-acetaminophen 5-325 mg per tablet Commonly known as:  Jessica Kind Take 1 Tab by mouth every four (4) hours as needed for Pain. Iron 325 mg (65 mg iron) Cper Generic drug:  ferrous sulfate Take  by mouth.  
  
 lisinopril 5 mg tablet Commonly known as:  PRINIVIL, ZESTRIL  
TAKE 1 TABLET BY MOUTH DAILY  
  
 loperamide 2 mg tablet Commonly known as:  IMMODIUM Take 2-4 mg by mouth as needed for Diarrhea. Give 2 tablets by mouth after the 1st loose stool and 1 tablet every 8 hours as needed for diarrhea, not to exceed 16 mg in 24 hours  
  
 magnesium oxide 400 mg tablet Commonly known as:  MAG-OX Take 400 mg by mouth two (2) times a day. omeprazole 20 mg capsule Commonly known as:  PRILOSEC Take 20 mg by mouth two (2) times a day. ondansetron 4 mg disintegrating tablet Commonly known as:  ZOFRAN ODT Take 4 mg by mouth every eight (8) hours as needed for Nausea. ONGLYZA 5 mg Tab tablet Generic drug:  sAXagliptin Take 5 mg by mouth daily. Takes at [employer] PLAVIX 75 mg Tab Generic drug:  clopidogrel Take 75 mg by mouth daily. potassium chloride 20 mEq tablet Commonly known as:  K-DUR, KLOR-CON Take 20 mEq by mouth daily (with dinner). predniSONE 10 mg tablet Commonly known as:  Marcelene Im Take 20 mg by mouth daily. PROAIR HFA 90 mcg/actuation inhaler Generic drug:  albuterol Take  by inhalation every four (4) hours as needed for Wheezing. promethazine 25 mg tablet Commonly known as:  PHENERGAN Take 25 mg by mouth every six (6) hours as needed for Nausea. PROTONIX 40 mg tablet Generic drug:  pantoprazole Take 40 mg by mouth daily. psyllium Powd Commonly known as:  METAMUCIL Take  by mouth daily. 2 teaspoons VITAMIN D3 1,000 unit tablet Generic drug:  cholecalciferol Take 1,000 Units by mouth daily. XARELTO 15 mg Tab tablet Generic drug:  rivaroxaban Take 15 mg by mouth daily. * Notice: This list has 2 medication(s) that are the same as other medications prescribed for you. Read the directions carefully, and ask your doctor or other care provider to review them with you. Introducing [facility]! Dear Janice Rodriguez: Thank you for requesting a [employer] account.   Our records indicate that you already have an active [employer] account. You can access your account anytime at [email or web address] Did you know that you can access your hospital and ER discharge instructions at any time in [employer]? You can also review all of your test results from your hospital stay or ER visit. Additional Information If you have questions, please visit the Frequently Asked Questions section of the [employer] website at [email or web address] Remember, [employer] is NOT to be used for urgent needs. For medical emergencies, dial 911. Now available from your iPhone and Android! Please provide this summary of care documentation to your next provider. Your primary care clinician is listed as Janet Cooley. If you have any questions after today's visit, please call [phone].

## 2018-08-31 NOTE — PROGRESS NOTES
Zarina Hutson MD 
 
[address] [facility], [address] [facility] Office [phone] Pager [phone] Pieter Guan is a 81 y.o. female is here for f/u visit  CAD Primary care physician: 
Daphney Dennis MD 
 
Patient Active Problem List  
Diagnosis Code  Type II diabetes mellitus (HCC) E11.9  
 NSTEMI (non-ST elevated myocardial infarction) ([facility] [address] I21.4  Coronary artery disease involving native coronary artery without angina pectoris I25.10  Essential hypertension I10  
 Recurrent deep vein thrombosis (DVT) ([facility]) I82.409  Chronic anticoagulation Z79.01  
 Chest pain R07.9  Generalized abdominal pain R10.84 Chief complaint: 
Chief Complaint Patient presents with  Follow-up Coronary artery disease involving native coronary artery without angina pectoris Dear Dr Dre Smith, 
 
I had the pleasure of seeing Ms Pieter Guan in the office today. Assessment: 
CAD s/p PCI to RCA with BMS ( NSTEMI 10/2015); 6/6/18- LAD stent/D1 PTCA ( CJW) HTN - low HLD - intolerant to multiple statins sec to myalgia DM - \" BS up and down\"/Not well controlled History of right DVT-October 2016; Has had prior DVT 1998 Chronic anticoagulation ( not on ASA/Plavix) Asthma - f/by Dr Chepe Evangelista Plan:  
 
 
Not on statin ( allergic/intolerant) -per patient her cholesterol is doing well. Has been intolerant to Lipitor, Crestor, Zocor-myalgias. She had her lipids checked in February 2018 ( PCP)  and it was elevated. We have tried to get her PCSK9 inhibitors. However the cost is prohibitive and the nurse navigator working on it. Has tried Zetia but had side effects including muscle aches. Continue Xarelto/Plavix Aggressive cardiovascular risk factor modification. Follow-up in 6 months. Patient understands the plan. All questions were answered to the patient's satisfaction. Medication Side Effects and Warnings were discussed with patient: yes Patient Labs were reviewed and or requested:  yes Patient Past Records were reviewed and or requested: yes I appreciate the opportunity to be involved in Ms. Sellers. See note below for details. Please do not hesitate to contact us with questions or concerns. Jesus Bonilla MD 
 
Cardiac Testing/ Procedures: A. Cardiac Cath/PCI: 6/6/18 At [facility] - LAD stent/PTCA of D1 
 
10/9/15 L Main: Medl;Nml 
 
LAD: Prox- Med; 50%; Distal - small; D1 - Small to med - prox 60% LCflex: Large; Tortuous; MLI; GUILLERMO - med - MLI 
 
RCA: Med; Prox 80%; Distal 95% just before bifurcation into small PDA and PLB LVEDP: 22 LVEF: 55%/ Mild basal inf hypokinesis No significant gradient across aortic valve. PCI: JR4 guide/BMW Pre dil with 2 by 12 balloon BMS 2.25 by 22 deployed at high [place]ena distally BMS 2.5 by 18 deployed at high milena proximal lesion Post dil with 2.5 by 15 B. ECHO/MEE: 6/2018 - Left ventricle: Systolic function was normal. Ejection fraction was 
estimated to be 60 %. There were no regional wall motion abnormalities. Doppler parameters were consistent with abnormal left ventricular 
relaxation (grade 1 diastolic dysfunction). Aortic valve: Leaflets exhibited normal cuspal separation and good 
mobility. Not well visualized. 10/11/15 - Left ventricle: Systolic function was vigorous. Ejection fraction was 
estimated in the range of 65 % to 70 %. There were no regional wall motion 
abnormalities. C.StressNuclear/Stress ECHO/Stress test: D.Vascular: 
 
E. EP: 
 
F. Miscellaneous: 
 
Subjective: 
Temi Ballesteros is a 81 y.o. female who returns for follow up  Visit. No CP/ C/o wheezing/dyspnea on exertion Patient had exploratory laparotomy in April 2017 at [facility] for intestinal obstruction. She is now on Xarelto and Plavix. History of recurrent DVT. ROS:cc (bold if positive, if negative) edema Medications before admission: 
 
Current Outpatient Prescriptions Medication Sig Dispense  omeprazole (PRILOSEC) 20 mg capsule Take 20 mg by mouth two (2) times a day.  ferrous sulfate (IRON) 325 mg (65 mg iron) cpER Take  by mouth.  albuterol (PROAIR HFA) 90 mcg/actuation inhaler Take  by inhalation every four (4) hours as needed for Wheezing.  carvedilol (COREG) 12.5 mg tablet TAKE 1 TABLET BY MOUTH TWICE DAILY WITH MEALS (Patient taking differently: Take 0.5 tab bid) 60 Tab  clopidogrel (PLAVIX) 75 mg tab Take 75 mg by mouth daily.  rivaroxaban (XARELTO) 15 mg tab tablet Take 15 mg by mouth daily.  lactobacillus rhamnosus gg 10 billion cell (CULTURELLE) 10 billion cell capsule Take 1 Cap by mouth daily.  acetaminophen (TYLENOL) 325 mg tablet Take 650 mg by mouth every six (6) hours as needed for Pain.  loperamide (IMMODIUM) 2 mg tablet Take 2-4 mg by mouth as needed for Diarrhea. Give 2 tablets by mouth after the 1st loose stool and 1 tablet every 8 hours as needed for diarrhea, not to exceed 16 mg in 24 hours  potassium chloride (K-DUR, KLOR-CON) 20 mEq tablet Take 20 mEq by mouth daily (with dinner).  lisinopril (PRINIVIL, ZESTRIL) 5 mg tablet TAKE 1 TABLET BY MOUTH DAILY 90 Tab  biotin 10,000 mcg cap Take 1 Cap by mouth daily.  saxagliptin (ONGLYZA) 5 mg tab tablet Take 5 mg by mouth daily. Takes at [employer]  DULoxetine (CYMBALTA) 60 mg capsule Take 60 mg by mouth daily.  cholecalciferol (VITAMIN D3) 1,000 unit tablet Take 1,000 Units by mouth daily.  azelastine-fluticasone (DYMISTA) 137-50 mcg/spray spry 1 Spray by Nasal route two (2) times daily as needed (allergies).  mometasone-formoterol (DULERA) 200-5 mcg/actuation HFA inhaler Take 2 Puffs by inhalation two (2) times a day.  predniSONE (DELTASONE) 10 mg tablet Take 20 mg by mouth daily.  promethazine (PHENERGAN) 25 mg tablet Take 25 mg by mouth every six (6) hours as needed for Nausea.  magnesium oxide (MAG-OX) 400 mg tablet Take 400 mg by mouth two (2) times a day.  ondansetron (ZOFRAN ODT) 4 mg disintegrating tablet Take 4 mg by mouth every eight (8) hours as needed for Nausea.  HYDROcodone-acetaminophen (NORCO) 5-325 mg per tablet Take 1 Tab by mouth every four (4) hours as needed for Pain.  pantoprazole (PROTONIX) 40 mg tablet Take 40 mg by mouth daily.  psyllium (METAMUCIL) powd Take  by mouth daily. 2 teaspoons  carvedilol (COREG) 6.25 mg tablet Take 6.25 mg by mouth two (2) times a day. No current facility-administered medications for this visit. Physical Exam: 
Visit Vitals  /76 (BP 1 Location: Right arm, BP Patient Position: Sitting)  Pulse (!) 112  Resp 18  Ht 5' 6\" (1.676 m)  Wt 246 lb (111.6 kg)  SpO2 98%  BMI 39.71 kg/m2 Gen: Well-developed, well-nourished, in no acute distress, Obese Neck: Supple,No JVD, No Carotid Bruit, Resp: No accessory muscle use, Bilat scattered rhonchi 
Card: Regular Rate,Rythm,Normal S1, S2, No murmurs, rubs or gallop. No thrills. Abd:  Soft, non-tender, non-distended,BS+,  
MSK: No cyanosis Skin: Ecchymosis+ Neuro: moving all four extremities , follows commands appropriately Psych:  Good insight, oriented to person, place , alert, Nml Affect LE: Trace edema EKG:  
 
 
LABS: 
 
 
 
Lab Results Component Value Date/Time WBC 6.6 07/01/2018 01:46 AM  
 HGB 8.5 (L) 07/01/2018 01:46 AM  
 HCT 28.7 (L) 07/01/2018 01:46 AM  
 PLATELET 614 27/89/3674 01:46 AM  
 MCV 89.7 07/01/2018 01:46 AM  
 
Lab Results Component Value Date/Time  Sodium 143 07/01/2018 01:46 AM  
 Potassium 3.4 (L) 07/01/2018 01:46 AM  
 Chloride 108 07/01/2018 01:46 AM  
 CO2 24 07/01/2018 01:46 AM  
 Anion gap 11 07/01/2018 01:46 AM  
 Glucose 104 (H) 07/01/2018 01:46 AM  
 BUN 11 07/01/2018 01:46 AM  
 Creatinine 0.97 07/01/2018 01:46 AM  
 BUN/Creatinine ratio 11 (L) 07/01/2018 01:46 AM  
 GFR est AA >60 07/01/2018 01:46 AM  
 GFR est non-AA 57 (L) 07/01/2018 01:46 AM  
 Calcium 8.5 07/01/2018 01:46 AM  
 
 
No results found for: APTT No results found for: INR, PTMR, PTP, PT1, PT2 No components found for: LAST LIPIDS Corbin Corrigan MD

## 2018-08-31 NOTE — PROGRESS NOTES
All health maintenance and other pertinent information has been reviewed in preparation for today's office visit. Patient presents in the office today for: Chief Complaint Patient presents with  Follow-up Coronary artery disease involving native coronary artery without angina pectoris 1. Have you been to the ER, urgent care clinic since your last visit? Hospitalized since your last visit? No 
 
2. Have you seen or consulted any other health care providers outside of the [address] [place] [address] since your last visit? Include any pap smears or colon screening.  No

## 2018-09-19 RX ORDER — CARVEDILOL 12.5 MG/1
TABLET ORAL
Qty: 60 TAB | Refills: 0 | Status: SHIPPED | OUTPATIENT
Start: 2018-09-19 | End: 2018-10-01 | Stop reason: ALTCHOICE

## 2018-10-01 ENCOUNTER — OFFICE VISIT (OUTPATIENT)
Dept: CARDIOLOGY CLINIC | Age: 73
End: 2018-10-01

## 2018-10-01 VITALS
BODY MASS INDEX: 41.69 KG/M2 | OXYGEN SATURATION: 97 % | HEIGHT: 66 IN | WEIGHT: 259.4 LBS | RESPIRATION RATE: 20 BRPM | HEART RATE: 120 BPM | DIASTOLIC BLOOD PRESSURE: 78 MMHG | SYSTOLIC BLOOD PRESSURE: 110 MMHG

## 2018-10-01 DIAGNOSIS — E66.01 OBESITY, MORBID (HCC): ICD-10-CM

## 2018-10-01 DIAGNOSIS — I25.10 CORONARY ARTERY DISEASE INVOLVING NATIVE CORONARY ARTERY OF NATIVE HEART WITHOUT ANGINA PECTORIS: ICD-10-CM

## 2018-10-01 DIAGNOSIS — R06.00 DYSPNEA, UNSPECIFIED TYPE: Primary | ICD-10-CM

## 2018-10-01 DIAGNOSIS — I10 ESSENTIAL HYPERTENSION: ICD-10-CM

## 2018-10-01 DIAGNOSIS — Z79.01 CHRONIC ANTICOAGULATION: ICD-10-CM

## 2018-10-01 PROBLEM — E11.21 TYPE 2 DIABETES WITH NEPHROPATHY (HCC): Status: ACTIVE | Noted: 2018-10-01

## 2018-10-01 RX ORDER — ALBUTEROL SULFATE 90 UG/1
AEROSOL, METERED RESPIRATORY (INHALATION) 4 TIMES DAILY
Status: ON HOLD | COMMUNITY
End: 2018-10-19 | Stop reason: CLARIF

## 2018-10-01 NOTE — PROGRESS NOTES
Vito Culp MD    [address] [place], [address] [facility]    Office [phone],JIK [phone]  Pager [phone]    Hugo Poe is a 81 y.o. female is here for f/u visit  CAD    Primary care physician:  Bhavna Ramirez MD    Patient Active Problem List   Diagnosis Code    Type II diabetes mellitus ([facility] [address] E11.9    NSTEMI (non-ST elevated myocardial infarction) ([address] I21.4    Coronary artery disease involving native coronary artery without angina pectoris I25.10    Essential hypertension I10    Recurrent deep vein thrombosis (DVT) ([facility]) I82.409    Chronic anticoagulation Z79.01    Chest pain R07.9    Generalized abdominal pain R10.84    Type 2 diabetes with nephropathy ([facility] [address] E11.21    Obesity, morbid ([facility] [address] E66.01       Chief complaint:  Chief Complaint   Patient presents with    Coronary Cordell Drone Hypertension       Dear Dr Janeth Mckee,    I had the pleasure of seeing Ms Hugo Poe in the office today. Assessment:  CAD s/p PCI to RCA with BMS ( NSTEMI 10/2015); 6/6/18- LAD stent/D1 PTCA ( CJW)  HTN - low  HLD - intolerant to multiple statins sec to myalgia  DM - \" BS up and down\"/Not well controlled  History of right DVT-October 2016; Has had prior DVT 1998  Chronic anticoagulation   Asthma - f/by Dr Paul Woodruff:     Has been taking Bumex on a as needed basis. Advised to take it daily for the next 3-4 days and then if the swelling in the lower extremities decreases, take it as needed. On potassium supplements. Has appointment to see PCP to get blood work done. Not on statin ( allergic/intolerant) -per patient her cholesterol is doing well. Has been intolerant to Lipitor, Crestor, Zocor-myalgias. She had her lipids checked in February 2018 ( PCP)  and it was elevated. We have tried to get her PCSK9 inhibitors. However the cost is prohibitive and the nurse navigator working on it.   Has tried Zetia but had side effects including muscle aches. Continue Xarelto/Plavix  Aggressive cardiovascular risk factor modification. Follow-up in 6 weeks. Patient understands the plan. All questions were answered to the patient's satisfaction. Medication Side Effects and Warnings were discussed with patient: yes  Patient Labs were reviewed and or requested:  yes  Patient Past Records were reviewed and or requested: yes    I appreciate the opportunity to be involved in Ms. Sellers. See note below for details. Please do not hesitate to contact us with questions or concerns. Sunday Dakins, MD    Cardiac Testing/ Procedures: A. Cardiac Cath/PCI: 6/6/18   At [address] [place] [address] - LAD stent/PTCA of D1    10/9/15 L Main: Medl;Nml    LAD: Prox- Med; 50%; Distal - small; D1 - Small to med - prox 60%    LCflex: Large; Tortuous; MLI; GUILLERMO - med - MLI    RCA: Med; Prox 80%; Distal 95% just before bifurcation into small PDA and PLB    LVEDP: 22    LVEF: 55%/ Mild basal inf hypokinesis    No significant gradient across aortic valve. PCI: JR4 guide/BMW  Pre dil with 2 by 12 balloon  BMS 2.25 by 22 deployed at high [place] distally  BMS 2.5 by 18 deployed at high milena proximal lesion  Post dil with 2.5 by 15      B. ECHO/MEE: 6/2018 - Left ventricle: Systolic function was normal. Ejection fraction was  estimated to be 60 %. There were no regional wall motion abnormalities. Doppler parameters were consistent with abnormal left ventricular  relaxation (grade 1 diastolic dysfunction). Aortic valve: Leaflets exhibited normal cuspal separation and good  mobility. Not well visualized. 10/11/15 - Left ventricle: Systolic function was vigorous. Ejection fraction was  estimated in the range of 65 % to 70 %. There were no regional wall motion  abnormalities. C.StressNuclear/Stress ECHO/Stress test:    D.Vascular:    E. EP:    F. Miscellaneous:    Subjective:  Gavin Marcelo is a 81 y.o. female who returns for follow up  Visit.   No CP/ C/o wheezing/dyspnea on exertion. Walking to the office here caused her to have shortness of breath requiring her to take the rescue inhaler. She has an appointment to see Dr. Yuki Finn. She is now on Xarelto and Plavix. History of recurrent DVT. ROS:cc  (bold if positive, if negative)     edema         Medications before admission:    Current Outpatient Prescriptions   Medication Sig Dispense    albuterol (PROVENTIL HFA, VENTOLIN HFA, PROAIR HFA) 90 mcg/actuation inhaler Take  by inhalation four (4) times daily.  carvedilol (COREG) 12.5 mg tablet TAKE 1 TABLET BY MOUTH TWICE DAILY WITH MEALS 60 Tab    omeprazole (PRILOSEC) 20 mg capsule Take 20 mg by mouth two (2) times a day.  ferrous sulfate (IRON) 325 mg (65 mg iron) cpER Take  by mouth two (2) times a day.  albuterol (PROAIR HFA) 90 mcg/actuation inhaler Take  by inhalation every four (4) hours as needed for Wheezing.  clopidogrel (PLAVIX) 75 mg tab Take 75 mg by mouth daily.  rivaroxaban (XARELTO) 15 mg tab tablet Take 15 mg by mouth daily.  lactobacillus rhamnosus gg 10 billion cell (CULTURELLE) 10 billion cell capsule Take 1 Cap by mouth daily.  promethazine (PHENERGAN) 25 mg tablet Take 25 mg by mouth every six (6) hours as needed for Nausea.  magnesium oxide (MAG-OX) 400 mg tablet Take 400 mg by mouth two (2) times a day.  ondansetron (ZOFRAN ODT) 4 mg disintegrating tablet Take 4 mg by mouth every eight (8) hours as needed for Nausea.  HYDROcodone-acetaminophen (NORCO) 5-325 mg per tablet Take 1 Tab by mouth every four (4) hours as needed for Pain.  acetaminophen (TYLENOL) 325 mg tablet Take 650 mg by mouth every six (6) hours as needed for Pain.  loperamide (IMMODIUM) 2 mg tablet Take 2-4 mg by mouth as needed for Diarrhea.  Give 2 tablets by mouth after the 1st loose stool and 1 tablet every 8 hours as needed for diarrhea, not to exceed 16 mg in 24 hours     potassium chloride (K-DUR, KLOR-CON) 20 mEq tablet Take 20 mEq by mouth daily (with dinner).  pantoprazole (PROTONIX) 40 mg tablet Take 40 mg by mouth daily.  psyllium (METAMUCIL) powd Take  by mouth daily. 2 teaspoons     carvedilol (COREG) 6.25 mg tablet Take 6.25 mg by mouth two (2) times a day.  lisinopril (PRINIVIL, ZESTRIL) 5 mg tablet TAKE 1 TABLET BY MOUTH DAILY 90 Tab    biotin 10,000 mcg cap Take 1 Cap by mouth daily.  saxagliptin (ONGLYZA) 5 mg tab tablet Take 5 mg by mouth daily. Takes at 1630     DULoxetine (CYMBALTA) 60 mg capsule Take 60 mg by mouth daily.  cholecalciferol (VITAMIN D3) 1,000 unit tablet Take 1,000 Units by mouth daily.  azelastine-fluticasone (DYMISTA) 137-50 mcg/spray spry 1 Spray by Nasal route two (2) times daily as needed (allergies).  mometasone-formoterol (DULERA) 200-5 mcg/actuation HFA inhaler Take 2 Puffs by inhalation two (2) times a day.  predniSONE (DELTASONE) 10 mg tablet Take 20 mg by mouth daily. No current facility-administered medications for this visit. Physical Exam:  Visit Vitals    /78 (BP 1 Location: Left arm)    Pulse (!) 120    Resp 20    Ht 5' 6\" (1.676 m)    Wt 259 lb 6.4 oz (117.7 kg)    SpO2 97%    BMI 41.87 kg/m2          Gen: Well-developed, well-nourished, in no acute distress, Obese  Neck: Supple,No JVD, No Carotid Bruit,   Resp: No accessory muscle use, Bilat  rhonchi  Card: Regular Rate,Rythm,Normal S1, S2, No murmurs, rubs or gallop. No thrills.    Abd:  Soft, non-tender, non-distended,BS+,   MSK: No cyanosis  Skin: Ecchymosis+  Neuro: moving all four extremities , follows commands appropriately  Psych:  Good insight, oriented to person, place , alert, Nml Affect  LE: 2+edema    EKG:       LABS:        Lab Results   Component Value Date/Time    WBC 6.6 07/01/2018 01:46 AM    HGB 8.5 (L) 07/01/2018 01:46 AM    HCT 28.7 (L) 07/01/2018 01:46 AM    PLATELET 852 58/51/3575 01:46 AM    MCV 89.7 07/01/2018 01:46 AM     Lab Results   Component Value Date/Time    Sodium 143 07/01/2018 01:46 AM    Potassium 3.4 (L) 07/01/2018 01:46 AM    Chloride 108 07/01/2018 01:46 AM    CO2 24 07/01/2018 01:46 AM    Anion gap 11 07/01/2018 01:46 AM    Glucose 104 (H) 07/01/2018 01:46 AM    BUN 11 07/01/2018 01:46 AM    Creatinine 0.97 07/01/2018 01:46 AM    BUN/Creatinine ratio 11 (L) 07/01/2018 01:46 AM    GFR est AA >60 07/01/2018 01:46 AM    GFR est non-AA 57 (L) 07/01/2018 01:46 AM    Calcium 8.5 07/01/2018 01:46 AM       No results found for: APTT  No results found for: INR, PTMR, PTP, PT1, PT2  No components found for: Yosi Saenz MD

## 2018-10-01 NOTE — PROGRESS NOTES
Visit Vitals    /78 (BP 1 Location: Left arm)    Pulse (!) 120    Resp 20    Ht 5' 6\" (1.676 m)    Wt 259 lb 6.4 oz (117.7 kg)    SpO2 97%    BMI 41.87 kg/m2       Patient c/o breathing issues. Just used her rescue inhaler here in the office. Pulse rate is now at about 120.

## 2018-10-01 NOTE — MR AVS SNAPSHOT
1659 [facility] 600 [address] [phone] Patient: Allen Squires MRN: [id number] :1945 Visit Information Date & Time Provider Department Dept. Phone Encounter #  
 10/1/2018  1:20 PM Orlando Loza MD [facility] Lesa Rodriguez [phone] [id number] Your Appointments 2018  1:40 PM  
ESTABLISHED PATIENT with Orlando Loza MD  
[facility] ([address]) Appt Note: 3 mo fup  
 205 [facility] 600 1007 [facility]  
54 e [facility] [address] [address] Upcoming Health Maintenance Date Due Hepatitis C Screening 1945 FOOT EXAM Q1 1957 MICROALBUMIN Q1 1957 EYE EXAM RETINAL OR DILATED Q1 1957 DTaP/Tdap/Td series (1 - Tdap) 1968 Shingrix Vaccine Age 50> (1 of 2) 1997 BREAST CANCER SCRN MAMMOGRAM 1997 FOBT Q 1 YEAR AGE 50-75 1997 GLAUCOMA SCREENING Q2Y 2012 Bone Densitometry (Dexa) Screening 2012 Pneumococcal 65+ Low/Medium Risk (1 of 2 - PCV13) 2012 LIPID PANEL Q1 10/10/2016 MEDICARE YEARLY EXAM 3/14/2018 Influenza Age 5 to Adult 2018 HEMOGLOBIN A1C Q6M 2018 Allergies as of 10/1/2018  Review Complete On: 10/1/2018 By: Raad Holcomb Severity Noted Reaction Type Reactions Advair Diskus [Fluticasone-salmeterol]  10/09/2015    Rash Ciprofloxacin  10/09/2015    Rash Statins-hmg-coa Reductase Inhibitors  10/09/2015    Other (comments) Muscle pain Lipitor/crestor/zocor Sulfa (Sulfonamide Antibiotics)  10/09/2015    Rash Tetracycline  10/09/2015    Other (comments) musclepain Zetia [Ezetimibe]  2018    Myalgia Current Immunizations  Never Reviewed Name Date Influenza Vaccine ([facility]in [place] [facility]) PF 10/10/2015 10:12 AM  
  
 Not reviewed this visit You Were Diagnosed With   
  
 Codes Comments Dyspnea, unspecified type    -  Primary ICD-10-CM: R06.00 
ICD-9-CM: 786.09 Coronary artery disease involving native coronary artery of native heart without angina pectoris     ICD-10-CM: I25.10 ICD-9-CM: 414.01 Essential hypertension     ICD-10-CM: I10 
ICD-9-CM: 401.9 Chronic anticoagulation     ICD-10-CM: Z79.01 
ICD-9-CM: V58.61 Obesity, morbid ([facility] [address]     ICD-10-CM: E66.01 
ICD-9-CM: 278.01 Vitals BP Pulse Resp Height(growth percentile) Weight(growth percentile) SpO2  
 110/78 (BP 1 Location: Left arm) (!) 120 20 5' 6\" (1.676 m) 259 lb 6.4 oz (117.7 kg) 97% BMI OB Status Smoking Status 41.87 kg/m2 Hysterectomy Former Smoker Vitals History BMI and BSA Data Body Mass Index Body Surface Area  
 41.87 kg/m 2 2.34 m 2 Preferred Pharmacy Pharmacy Name Phone [facility] [place], [facility] [address] AT [address] [phone] Your Updated Medication List  
  
   
This list is accurate as of 10/1/18  1:53 PM.  Always use your most recent med list.  
  
  
  
  
 acetaminophen 325 mg tablet Commonly known as:  TYLENOL Take 650 mg by mouth every six (6) hours as needed for Pain. biotin 10,000 mcg Cap Take 1 Cap by mouth daily. carvedilol 6.25 mg tablet Commonly known as:  Mary Gloss Take 6.25 mg by mouth two (2) times a day. CULTURELLE 10 billion cell capsule Generic drug:  lactobacillus rhamnosus gg 10 billion cell Take 1 Cap by mouth daily. DULERA 200-5 mcg/actuation HFA inhaler Generic drug:  mometasone-formoterol Take 2 Puffs by inhalation two (2) times a day. DULoxetine 60 mg capsule Commonly known as:  CYMBALTA Take 60 mg by mouth daily. DYMISTA 137-50 mcg/spray Spry Generic drug:  azelastine-fluticasone 1 Spray by Nasal route two (2) times daily as needed (allergies). HYDROcodone-acetaminophen 5-325 mg per tablet Commonly known as:  Jeneal Tamiko Take 1 Tab by mouth every four (4) hours as needed for Pain. Iron 325 mg (65 mg iron) Cper Generic drug:  ferrous sulfate Take  by mouth two (2) times a day. lisinopril 5 mg tablet Commonly known as:  PRINIVIL, ZESTRIL  
TAKE 1 TABLET BY MOUTH DAILY  
  
 loperamide 2 mg tablet Commonly known as:  IMMODIUM Take 2-4 mg by mouth as needed for Diarrhea. Give 2 tablets by mouth after the 1st loose stool and 1 tablet every 8 hours as needed for diarrhea, not to exceed 16 mg in 24 hours  
  
 magnesium oxide 400 mg (241.3 mg magnesium) tablet Commonly known as:  MAG-OX Take 400 mg by mouth two (2) times a day. omeprazole 20 mg capsule Commonly known as:  PRILOSEC Take 20 mg by mouth two (2) times a day. ondansetron 4 mg disintegrating tablet Commonly known as:  ZOFRAN ODT Take 4 mg by mouth every eight (8) hours as needed for Nausea. ONGLYZA 5 mg Tab tablet Generic drug:  sAXagliptin Take 5 mg by mouth daily. Takes at [employer] PLAVIX 75 mg Tab Generic drug:  clopidogrel Take 75 mg by mouth daily. potassium chloride 20 mEq tablet Commonly known as:  K-DUR, KLOR-CON Take 20 mEq by mouth daily (with dinner). predniSONE 10 mg tablet Commonly known as:  Luevenia [place] Take 20 mg by mouth daily. * PROAIR HFA 90 mcg/actuation inhaler Generic drug:  albuterol Take  by inhalation every four (4) hours as needed for Wheezing. * albuterol 90 mcg/actuation inhaler Commonly known as:  PROVENTIL HFA, VENTOLIN HFA, PROAIR HFA Take  by inhalation four (4) times daily. promethazine 25 mg tablet Commonly known as:  PHENERGAN Take 25 mg by mouth every six (6) hours as needed for Nausea. PROTONIX 40 mg tablet Generic drug:  pantoprazole Take 40 mg by mouth daily. psyllium Powd Commonly known as:  METAMUCIL Take  by mouth daily. 2 teaspoons VITAMIN D3 1,000 unit tablet Generic drug:  cholecalciferol Take 1,000 Units by mouth daily. XARELTO 15 mg Tab tablet Generic drug:  rivaroxaban Take 15 mg by mouth daily. * Notice: This list has 2 medication(s) that are the same as other medications prescribed for you. Read the directions carefully, and ask your doctor or other care provider to review them with you. Introducing [facility] SERVICES! Dear Tyler Forth: Thank you for requesting a [employer] account. Our records indicate that you already have an active [employer] account. You can access your account anytime at [email or web address] Did you know that you can access your hospital and ER discharge instructions at any time in [employer]? You can also review all of your test results from your hospital stay or ER visit. Additional Information If you have questions, please visit the Frequently Asked Questions section of the [employer] website at [email or web address] Remember, [employer] is NOT to be used for urgent needs. For medical emergencies, dial 911. Now available from your iPhone and Android! Please provide this summary of care documentation to your next provider. Your primary care clinician is listed as Arsh Sarkar. If you have any questions after today's visit, please call [phone].

## 2018-10-12 ENCOUNTER — HOSPITAL ENCOUNTER (OUTPATIENT)
Dept: CT IMAGING | Age: 73
Discharge: HOME | End: 2018-10-12
Attending: NURSE PRACTITIONER
Payer: MEDICARE

## 2018-10-12 ENCOUNTER — TELEPHONE (OUTPATIENT)
Dept: CARDIOLOGY CLINIC | Age: 73
End: 2018-10-12

## 2018-10-12 DIAGNOSIS — R06.02 SOB (SHORTNESS OF BREATH): ICD-10-CM

## 2018-10-12 PROCEDURE — 71275 CT ANGIOGRAPHY CHEST: CPT

## 2018-10-12 PROCEDURE — 82565 ASSAY OF CREATININE: CPT

## 2018-10-12 PROCEDURE — 71250 CT THORAX DX C-: CPT

## 2018-10-13 LAB — CREAT BLD-MCNC: 1.2 MG/DL (ref 0.6–1.3)

## 2018-10-15 NOTE — TELEPHONE ENCOUNTER
Called patient advised per Dr. David Mas if she is not doing well and her weight continues to increase/increasing edema she needs to be seen by myself or ANP here soon. Patient stated she has an appointment to see her Pulmonologist tomorrow morning at 8:30 am for an appointment she had a CT scan done and it was clear she continues to experience some chest tightness and SOB at times she will keep us informed.

## 2018-10-16 ENCOUNTER — TELEPHONE (OUTPATIENT)
Dept: CARDIOLOGY CLINIC | Age: 73
End: 2018-10-16

## 2018-10-16 NOTE — TELEPHONE ENCOUNTER
Pt is calling to return the nurse's phone call. Pt stated she was suppose to call after her pulmonologist appointment.    Phone# [phone]

## 2018-10-17 ENCOUNTER — OFFICE VISIT (OUTPATIENT)
Dept: CARDIOLOGY CLINIC | Age: 73
End: 2018-10-17

## 2018-10-17 ENCOUNTER — DOCUMENTATION ONLY (OUTPATIENT)
Dept: CARDIOLOGY CLINIC | Age: 73
End: 2018-10-17

## 2018-10-17 VITALS
SYSTOLIC BLOOD PRESSURE: 96 MMHG | HEART RATE: 79 BPM | OXYGEN SATURATION: 99 % | HEIGHT: 66 IN | BODY MASS INDEX: 39.86 KG/M2 | WEIGHT: 248 LBS | DIASTOLIC BLOOD PRESSURE: 72 MMHG | RESPIRATION RATE: 12 BRPM

## 2018-10-17 DIAGNOSIS — R07.9 CHEST PAIN, UNSPECIFIED TYPE: Primary | ICD-10-CM

## 2018-10-17 DIAGNOSIS — I25.10 CORONARY ARTERY DISEASE INVOLVING NATIVE CORONARY ARTERY OF NATIVE HEART WITHOUT ANGINA PECTORIS: Primary | ICD-10-CM

## 2018-10-17 DIAGNOSIS — I25.10 ATHEROSCLEROSIS OF NATIVE CORONARY ARTERY OF NATIVE HEART WITHOUT ANGINA PECTORIS: Primary | ICD-10-CM

## 2018-10-17 RX ORDER — SODIUM CHLORIDE 9 MG/ML
75 INJECTION, SOLUTION INTRAVENOUS CONTINUOUS
Status: CANCELLED | OUTPATIENT
Start: 2018-10-19

## 2018-10-17 RX ORDER — SODIUM CHLORIDE 0.9 % (FLUSH) 0.9 %
5-10 SYRINGE (ML) INJECTION EVERY 8 HOURS
Status: CANCELLED | OUTPATIENT
Start: 2018-10-17

## 2018-10-17 RX ORDER — SODIUM CHLORIDE 0.9 % (FLUSH) 0.9 %
5-10 SYRINGE (ML) INJECTION AS NEEDED
Status: CANCELLED | OUTPATIENT
Start: 2018-10-17

## 2018-10-17 NOTE — PROGRESS NOTES
Wilian Alcazar DO [facility] [address] [facility], [address] [place], [id number] [place] [address] Office [phone] Cintia Mills is a 81 y.o. female here for urgent visit for increased chest pain, stewart. Assessment/Recommendations: 
 
Stable angina symptoms- stenting a few months ago. Having increased in stable angina symptoms over the past few weeks. CAD HFpEF 
DM 
 
- continue current cardiovascular regimen 
- right and left heart catheterization with Dr. Bert Swanson on Friday Primary Care Physician- Estrellita Love MD 
 
 
Subjective: 
 
82 yo female with hx of coronary artery disease with complex medical history in spring of 2018 with collapsed lung. Stenting of LAD and diagonal at that time. Recently over the past few weeks she reports increased chest tightness with exertion along with increased shortness of breath. Was Rx bumex with improvement in edema and weight, but still very dyspneic Past Medical History:  
Diagnosis Date  Asthma  Autoimmune disease ([address]   
 fibromyalgia  CAD (coronary artery disease) 10/9/2015  Diabetes ([address]  DVT (deep vein thrombosis) in pregnancy ([address]  GERD (gastroesophageal reflux disease)  HTN (hypertension)  NSTEMI (non-ST elevated myocardial infarction) ([address] 10/9/2015  Sleep apnea   
 wears CPAP Past Surgical History:  
Procedure Laterality Date  ABDOMEN SURGERY PROC UNLISTED    
 hital hernia repair, gall bladder removed  BREAST SURGERY PROCEDURE UNLISTED    
 lumpectomy  CARDIAC SURG PROCEDURE UNLIST  10/9/15  
 2 stents [place]  HX COLOSTOMY    
 and reversal, post episiotomy repair [address]  HX UROLOGICAL  2009  
 bladder sling Current Outpatient Medications on File Prior to Visit Medication Sig Dispense Refill  albuterol (PROVENTIL HFA, VENTOLIN HFA, PROAIR HFA) 90 mcg/actuation inhaler Take  by inhalation four (4) times daily.  omeprazole (PRILOSEC) 20 mg capsule Take 20 mg by mouth two (2) times a day.  albuterol (PROAIR HFA) 90 mcg/actuation inhaler Take  by inhalation every four (4) hours as needed for Wheezing.  clopidogrel (PLAVIX) 75 mg tab Take 75 mg by mouth daily.  rivaroxaban (XARELTO) 15 mg tab tablet Take 15 mg by mouth daily.  lactobacillus rhamnosus gg 10 billion cell (CULTURELLE) 10 billion cell capsule Take 1 Cap by mouth daily.  promethazine (PHENERGAN) 25 mg tablet Take 25 mg by mouth every six (6) hours as needed for Nausea.  HYDROcodone-acetaminophen (NORCO) 5-325 mg per tablet Take 1 Tab by mouth every four (4) hours as needed for Pain.  acetaminophen (TYLENOL) 325 mg tablet Take 650 mg by mouth every six (6) hours as needed for Pain.  loperamide (IMMODIUM) 2 mg tablet Take 2-4 mg by mouth as needed for Diarrhea. Give 2 tablets by mouth after the 1st loose stool and 1 tablet every 8 hours as needed for diarrhea, not to exceed 16 mg in 24 hours  potassium chloride (K-DUR, KLOR-CON) 20 mEq tablet Take 20 mEq by mouth daily (with dinner).  psyllium (METAMUCIL) powd Take  by mouth daily. 2 teaspoons  carvedilol (COREG) 6.25 mg tablet Take 6.25 mg by mouth two (2) times a day.  biotin 10,000 mcg cap Take 1 Cap by mouth daily.  saxagliptin (ONGLYZA) 5 mg tab tablet Take 5 mg by mouth daily. Takes at [employer]  DULoxetine (CYMBALTA) 60 mg capsule Take 60 mg by mouth daily.  cholecalciferol (VITAMIN D3) 1,000 unit tablet Take 1,000 Units by mouth daily.  azelastine-fluticasone (DYMISTA) 137-50 mcg/spray spry 1 Spray by Nasal route two (2) times daily as needed (allergies).  mometasone-formoterol (DULERA) 200-5 mcg/actuation HFA inhaler Take 2 Puffs by inhalation two (2) times a day.  predniSONE (DELTASONE) 10 mg tablet Take 20 mg by mouth daily.  evolocumab (REPATHA SURECLICK) pen injection 1 mL by SubCUTAneous route every fourteen (14) days. 8 Pen 1  
 ferrous sulfate (IRON) 325 mg (65 mg iron) cpER Take  by mouth two (2) times a day.  magnesium oxide (MAG-OX) 400 mg tablet Take 400 mg by mouth two (2) times a day.  ondansetron (ZOFRAN ODT) 4 mg disintegrating tablet Take 4 mg by mouth every eight (8) hours as needed for Nausea.  pantoprazole (PROTONIX) 40 mg tablet Take 40 mg by mouth daily.  lisinopril (PRINIVIL, ZESTRIL) 5 mg tablet TAKE 1 TABLET BY MOUTH DAILY 90 Tab 3 No current facility-administered medications on file prior to visit. Allergies Allergen Reactions  Advair Diskus [Fluticasone-Salmeterol] Rash  Ciprofloxacin Rash  Statins-Hmg-Coa Reductase Inhibitors Other (comments) Muscle pain Lipitor/crestor/zocor  Sulfa (Sulfonamide Antibiotics) Rash  Tetracycline Other (comments) musclepain  Zetia [Ezetimibe] Myalgia Family History Problem Relation Age of Onset  Diabetes Mother  Heart Failure Mother  Kidney Disease Mother  Diabetes Father  Heart Disease Father  Elevated Lipids Father  Heart Failure Father  Heart Disease Brother  Cancer Brother   
     kidney  Diabetes Brother  No Known Problems Brother  No Known Problems Brother Review of Symptoms: 
Pertinent Positive: chest tightness, SOB Pertinent Negative:no orthopnea, pnd All Other systems reviewed and are negative for a Comprehensive ROS (10+) Physical Exam 
 
Blood pressure 96/72, pulse 79, resp. rate 12, height 5' 6\" (1.676 m), weight 248 lb (112.5 kg), SpO2 99 %. Constitutional:  well-developed and well-nourished. No distress. HENT: Normocephalic. Eyes: No scleral icterus. Neck:  Neck supple. No JVD present. Pulmonary/Chest: Effort normal audible wheeze, no crackles Cardiovascular: Normal rate, regular rhythm, S1 S2 . Exam reveals no gallop and no friction rub. No murmur heard. Mild edema. Extremities:  Normal muscle tone Abdominal:   No abnormal distension. Neurological:  Moving all extremities, cranial nerves appear grossly intact. Skin: Skin is not cold. Not diaphoretic. No erythema. Psychiatric:  Grossly normal mood and affect. Intact insight. Objective Data: A. Cardiac Cath/PCI: 6/6/18 At [address] [place] [address] - LAD stent/PTCA of D1 
  
10/9/15 L Main: Medl;Nml 
  
LAD: Prox- Med; 50%; Distal - small; D1 - Small to med - prox 60% 
  
LCflex: Large; Tortuous; MLI; GUILLERMO - med - MLI 
  
RCA: Med; Prox 80%; Distal 95% just before bifurcation into small PDA and PLB 
  
LVEDP: 22 
  
LVEF: 55%/ Mild basal inf hypokinesis 
  
No significant gradient across aortic valve. 
  
PCI: JR4 guide/BMW Pre dil with 2 by 12 balloon BMS 2.25 by 22 deployed at high milena distally BMS 2.5 by 18 deployed at high milena proximal lesion Post dil with 2.5 by 15 
  
  
B. ECHO/MEE: 6/2018 - Left ventricle: Systolic function was normal. Ejection fraction was 
estimated to be 60 %. There were no regional wall motion abnormalities. Doppler parameters were consistent with abnormal left ventricular 
relaxation (grade 1 diastolic dysfunction). Aortic valve: Leaflets exhibited normal cuspal separation and good 
mobility. Not well visualized. 
  
 10/11/15 - Left ventricle: Systolic function was vigorous. Ejection fraction was 
estimated in the range of 65 % to 70 %. There were no regional wall motion 
abnormalities.

## 2018-10-17 NOTE — PROGRESS NOTES
Right/Left Cardiac Craterization set up w/ Danitza  in Cath lab for Friday, October 19th at 7:30 am.  Instructions given to patient to arrive at 6:30 am for the procedure and to STOP Xarelto today and have labs drawn today at [facility] on 3rd floor. Patient verbalized understanding. Orders in [facility], scheduled in usha, request faxed/sent to [facility].

## 2018-10-17 NOTE — H&P (VIEW-ONLY)
Hannah Smallwood DO [facility] [address] [facility], [address] [place], [address] [place] [address] Office [phone] Dannielle Aquino is a 81 y.o. female here for urgent visit for increased chest pain, stewart. Assessment/Recommendations: 
 
Stable angina symptoms- stenting a few months ago. Having increased in stable angina symptoms over the past few weeks. CAD HFpEF 
DM 
 
- continue current cardiovascular regimen 
- right and left heart catheterization with Dr. Belgica Xavier on Friday Primary Care Physician- Coty Sellers MD 
 
 
Subjective: 
 
80 yo female with hx of coronary artery disease with complex medical history in spring of 2018 with collapsed lung. Stenting of LAD and diagonal at that time. Recently over the past few weeks she reports increased chest tightness with exertion along with increased shortness of breath. Was Rx bumex with improvement in edema and weight, but still very dyspneic Past Medical History:  
Diagnosis Date  Asthma  Autoimmune disease ([address]   
 fibromyalgia  CAD (coronary artery disease) 10/9/2015  Diabetes ([address]  DVT (deep vein thrombosis) in pregnancy ([address]  GERD (gastroesophageal reflux disease)  HTN (hypertension)  NSTEMI (non-ST elevated myocardial infarction) ([address] 10/9/2015  Sleep apnea   
 wears CPAP Past Surgical History:  
Procedure Laterality Date  ABDOMEN SURGERY PROC UNLISTED    
 hital hernia repair, gall bladder removed  BREAST SURGERY PROCEDURE UNLISTED    
 lumpectomy  CARDIAC SURG PROCEDURE UNLIST  10/9/15  
 2 stents [place]  HX COLOSTOMY    
 and reversal, post episiotomy repair [address]  HX UROLOGICAL  2009  
 bladder sling Current Outpatient Medications on File Prior to Visit Medication Sig Dispense Refill  albuterol (PROVENTIL HFA, VENTOLIN HFA, PROAIR HFA) 90 mcg/actuation inhaler Take  by inhalation four (4) times daily.  omeprazole (PRILOSEC) 20 mg capsule Take 20 mg by mouth two (2) times a day.  albuterol (PROAIR HFA) 90 mcg/actuation inhaler Take  by inhalation every four (4) hours as needed for Wheezing.  clopidogrel (PLAVIX) 75 mg tab Take 75 mg by mouth daily.  rivaroxaban (XARELTO) 15 mg tab tablet Take 15 mg by mouth daily.  lactobacillus rhamnosus gg 10 billion cell (CULTURELLE) 10 billion cell capsule Take 1 Cap by mouth daily.  promethazine (PHENERGAN) 25 mg tablet Take 25 mg by mouth every six (6) hours as needed for Nausea.  HYDROcodone-acetaminophen (NORCO) 5-325 mg per tablet Take 1 Tab by mouth every four (4) hours as needed for Pain.  acetaminophen (TYLENOL) 325 mg tablet Take 650 mg by mouth every six (6) hours as needed for Pain.  loperamide (IMMODIUM) 2 mg tablet Take 2-4 mg by mouth as needed for Diarrhea. Give 2 tablets by mouth after the 1st loose stool and 1 tablet every 8 hours as needed for diarrhea, not to exceed 16 mg in 24 hours  potassium chloride (K-DUR, KLOR-CON) 20 mEq tablet Take 20 mEq by mouth daily (with dinner).  psyllium (METAMUCIL) powd Take  by mouth daily. 2 teaspoons  carvedilol (COREG) 6.25 mg tablet Take 6.25 mg by mouth two (2) times a day.  biotin 10,000 mcg cap Take 1 Cap by mouth daily.  saxagliptin (ONGLYZA) 5 mg tab tablet Take 5 mg by mouth daily. Takes at [employer]  DULoxetine (CYMBALTA) 60 mg capsule Take 60 mg by mouth daily.  cholecalciferol (VITAMIN D3) 1,000 unit tablet Take 1,000 Units by mouth daily.  azelastine-fluticasone (DYMISTA) 137-50 mcg/spray spry 1 Spray by Nasal route two (2) times daily as needed (allergies).  mometasone-formoterol (DULERA) 200-5 mcg/actuation HFA inhaler Take 2 Puffs by inhalation two (2) times a day.  predniSONE (DELTASONE) 10 mg tablet Take 20 mg by mouth daily.  evolocumab (REPATHA SURECLICK) pen injection 1 mL by SubCUTAneous route every fourteen (14) days. 8 Pen 1  
 ferrous sulfate (IRON) 325 mg (65 mg iron) cpER Take  by mouth two (2) times a day.  magnesium oxide (MAG-OX) 400 mg tablet Take 400 mg by mouth two (2) times a day.  ondansetron (ZOFRAN ODT) 4 mg disintegrating tablet Take 4 mg by mouth every eight (8) hours as needed for Nausea.  pantoprazole (PROTONIX) 40 mg tablet Take 40 mg by mouth daily.  lisinopril (PRINIVIL, ZESTRIL) 5 mg tablet TAKE 1 TABLET BY MOUTH DAILY 90 Tab 3 No current facility-administered medications on file prior to visit. Allergies Allergen Reactions  Advair Diskus [Fluticasone-Salmeterol] Rash  Ciprofloxacin Rash  Statins-Hmg-Coa Reductase Inhibitors Other (comments) Muscle pain Lipitor/crestor/zocor  Sulfa (Sulfonamide Antibiotics) Rash  Tetracycline Other (comments) musclepain  Zetia [Ezetimibe] Myalgia Family History Problem Relation Age of Onset  Diabetes Mother  Heart Failure Mother  Kidney Disease Mother  Diabetes Father  Heart Disease Father  Elevated Lipids Father  Heart Failure Father  Heart Disease Brother  Cancer Brother   
     kidney  Diabetes Brother  No Known Problems Brother  No Known Problems Brother Review of Symptoms: 
Pertinent Positive: chest tightness, SOB Pertinent Negative:no orthopnea, pnd All Other systems reviewed and are negative for a Comprehensive ROS (10+) Physical Exam 
 
Blood pressure 96/72, pulse 79, resp. rate 12, height 5' 6\" (1.676 m), weight 248 lb (112.5 kg), SpO2 99 %. Constitutional:  well-developed and well-nourished. No distress. HENT: Normocephalic. Eyes: No scleral icterus. Neck:  Neck supple. No JVD present. Pulmonary/Chest: Effort normal audible wheeze, no crackles Cardiovascular: Normal rate, regular rhythm, S1 S2 . Exam reveals no gallop and no friction rub. No murmur heard. Mild edema. Extremities:  Normal muscle tone Abdominal:   No abnormal distension. Neurological:  Moving all extremities, cranial nerves appear grossly intact. Skin: Skin is not cold. Not diaphoretic. No erythema. Psychiatric:  Grossly normal mood and affect. Intact insight. Objective Data: A. Cardiac Cath/PCI: 6/6/18 At [address] [place] [address] - LAD stent/PTCA of D1 
  
10/9/15 L Main: Medl;Nml 
  
LAD: Prox- Med; 50%; Distal - small; D1 - Small to med - prox 60% 
  
LCflex: Large; Tortuous; MLI; GUILLERMO - med - MLI 
  
RCA: Med; Prox 80%; Distal 95% just before bifurcation into small PDA and PLB 
  
LVEDP: 22 
  
LVEF: 55%/ Mild basal inf hypokinesis 
  
No significant gradient across aortic valve. 
  
PCI: JR4 guide/BMW Pre dil with 2 by 12 balloon BMS 2.25 by 22 deployed at high milena distally BMS 2.5 by 18 deployed at high milena proximal lesion Post dil with 2.5 by 15 
  
  
B. ECHO/MEE: 6/2018 - Left ventricle: Systolic function was normal. Ejection fraction was 
estimated to be 60 %. There were no regional wall motion abnormalities. Doppler parameters were consistent with abnormal left ventricular 
relaxation (grade 1 diastolic dysfunction). Aortic valve: Leaflets exhibited normal cuspal separation and good 
mobility. Not well visualized. 
  
 10/11/15 - Left ventricle: Systolic function was vigorous. Ejection fraction was 
estimated in the range of 65 % to 70 %. There were no regional wall motion 
abnormalities.

## 2018-10-17 NOTE — PROGRESS NOTES
1. Have you been to the ER, urgent care clinic since your last visit? Hospitalized since your last visit? No 
 
2. Have you seen or consulted any other health care providers outside of the [address] [place] [address] since your last visit? Include any pap smears or colon screening. No  
 
 
Pt reports Med Rec completed. Pt reports taking Bumex rx by Catrina Stroud. Chief Complaint Patient presents with  Chest Pain  
  82 y/o female states SOB and Chest pain x 2 months. Pt states medication not helping.  Shortness of Breath Visit Vitals BP 96/72 (BP 1 Location: Left arm, BP Patient Position: Sitting) Pulse 79 Resp 12 Ht 5' 6\" (1.676 m) Wt 248 lb (112.5 kg) SpO2 99% BMI 40.03 kg/m²

## 2018-10-18 LAB
BUN SERPL-MCNC: 29 MG/DL (ref 8–27)
BUN/CREAT SERPL: 27 (ref 12–28)
CALCIUM SERPL-MCNC: 8.7 MG/DL (ref 8.7–10.3)
CHLORIDE SERPL-SCNC: 100 MMOL/L (ref 96–106)
CO2 SERPL-SCNC: 21 MMOL/L (ref 20–29)
CREAT SERPL-MCNC: 1.07 MG/DL (ref 0.57–1)
ERYTHROCYTE [DISTWIDTH] IN BLOOD BY AUTOMATED COUNT: 19.5 % (ref 12.3–15.4)
GLUCOSE SERPL-MCNC: 319 MG/DL (ref 65–99)
HCT VFR BLD AUTO: 36.8 % (ref 34–46.6)
HGB BLD-MCNC: 11.7 G/DL (ref 11.1–15.9)
INR PPP: 1.1 (ref 0.8–1.2)
INTERPRETATION: NORMAL
MCH RBC QN AUTO: 28.5 PG (ref 26.6–33)
MCHC RBC AUTO-ENTMCNC: 31.8 G/DL (ref 31.5–35.7)
MCV RBC AUTO: 90 FL (ref 79–97)
PLATELET # BLD AUTO: 324 X10E3/UL (ref 150–379)
POTASSIUM SERPL-SCNC: 5 MMOL/L (ref 3.5–5.2)
PROTHROMBIN TIME: 11.2 SEC (ref 9.1–12)
RBC # BLD AUTO: 4.1 X10E6/UL (ref 3.77–5.28)
SODIUM SERPL-SCNC: 139 MMOL/L (ref 134–144)
WBC # BLD AUTO: 12.8 X10E3/UL (ref 3.4–10.8)

## 2018-10-19 ENCOUNTER — HOSPITAL ENCOUNTER (OUTPATIENT)
Dept: CARDIAC CATH/INVASIVE PROCEDURES | Age: 73
Discharge: HOME | End: 2018-10-19
Attending: INTERNAL MEDICINE | Admitting: INTERNAL MEDICINE
Payer: MEDICARE

## 2018-10-19 VITALS
TEMPERATURE: 97.9 F | OXYGEN SATURATION: 98 % | DIASTOLIC BLOOD PRESSURE: 75 MMHG | HEIGHT: 66 IN | SYSTOLIC BLOOD PRESSURE: 154 MMHG | HEART RATE: 104 BPM | BODY MASS INDEX: 42.09 KG/M2 | RESPIRATION RATE: 21 BRPM | WEIGHT: 261.91 LBS

## 2018-10-19 DIAGNOSIS — I25.10 ATHEROSCLEROSIS OF NATIVE CORONARY ARTERY OF NATIVE HEART WITHOUT ANGINA PECTORIS: ICD-10-CM

## 2018-10-19 LAB
COHGB MFR BLD: 0.2 % (ref 1–2)
COHGB MFR BLD: 0.7 % (ref 1–2)
COHGB MFR BLD: 0.8 % (ref 1–2)
GLUCOSE BLD STRIP.AUTO-MCNC: 106 MG/DL (ref 65–100)
GLUCOSE BLD STRIP.AUTO-MCNC: 217 MG/DL (ref 65–100)
HGB BLD OXIMETRY-MCNC: 11.1 G/DL (ref 14–17)
HGB BLD OXIMETRY-MCNC: 11.6 G/DL (ref 14–17)
HGB BLD OXIMETRY-MCNC: 11.7 G/DL (ref 14–17)
METHGB MFR BLD: 0.1 % (ref 0–1.4)
METHGB MFR BLD: 0.2 % (ref 0–1.4)
METHGB MFR BLD: 0.2 % (ref 0–1.4)
OXYHGB MFR BLD: 68 % (ref 94–97)
OXYHGB MFR BLD: 68.3 % (ref 94–97)
OXYHGB MFR BLD: 92.3 % (ref 94–97)
SAO2 % BLD: 69 % (ref 95–99)
SAO2 % BLD: 69 % (ref 95–99)
SAO2 % BLD: 93 % (ref 95–99)
SERVICE CMNT-IMP: ABNORMAL
SERVICE CMNT-IMP: ABNORMAL

## 2018-10-19 PROCEDURE — 82962 GLUCOSE BLOOD TEST: CPT

## 2018-10-19 PROCEDURE — 74011250637 HC RX REV CODE- 250/637: Performed by: INTERNAL MEDICINE

## 2018-10-19 PROCEDURE — C1894 INTRO/SHEATH, NON-LASER: HCPCS

## 2018-10-19 PROCEDURE — 82375 ASSAY CARBOXYHB QUANT: CPT | Performed by: INTERNAL MEDICINE

## 2018-10-19 PROCEDURE — 83050 HGB METHEMOGLOBIN QUAN: CPT | Performed by: INTERNAL MEDICINE

## 2018-10-19 PROCEDURE — 74011636320 HC RX REV CODE- 636/320: Performed by: INTERNAL MEDICINE

## 2018-10-19 PROCEDURE — 99152 MOD SED SAME PHYS/QHP 5/>YRS: CPT

## 2018-10-19 PROCEDURE — 77030004532 HC CATH ANGI DX IMP BSC -A

## 2018-10-19 PROCEDURE — 77030029065 HC DRSG HEMO QCLOT ZMED -B

## 2018-10-19 PROCEDURE — 77030008543 HC TBNG MON PRSS MRTM -A

## 2018-10-19 PROCEDURE — C1751 CATH, INF, PER/CENT/MIDLINE: HCPCS

## 2018-10-19 PROCEDURE — 74011250636 HC RX REV CODE- 250/636: Performed by: INTERNAL MEDICINE

## 2018-10-19 PROCEDURE — C1769 GUIDE WIRE: HCPCS

## 2018-10-19 PROCEDURE — 77030018836 HC SOL IRR NACL ICUM -A

## 2018-10-19 RX ORDER — ACETAMINOPHEN 325 MG/1
650 TABLET ORAL
Status: CANCELLED | OUTPATIENT
Start: 2018-10-19

## 2018-10-19 RX ORDER — SODIUM CHLORIDE 0.9 % (FLUSH) 0.9 %
5-10 SYRINGE (ML) INJECTION EVERY 8 HOURS
Status: DISCONTINUED | OUTPATIENT
Start: 2018-10-19 | End: 2018-10-19 | Stop reason: HOSPADM

## 2018-10-19 RX ORDER — HEPARIN SODIUM 200 [USP'U]/100ML
500 INJECTION, SOLUTION INTRAVENOUS ONCE
Status: COMPLETED | OUTPATIENT
Start: 2018-10-19 | End: 2018-10-19

## 2018-10-19 RX ORDER — ASPIRIN 325 MG
TABLET ORAL
Status: DISCONTINUED
Start: 2018-10-19 | End: 2018-10-19 | Stop reason: HOSPADM

## 2018-10-19 RX ORDER — LIDOCAINE HYDROCHLORIDE 10 MG/ML
20 INJECTION INFILTRATION; PERINEURAL
Status: DISCONTINUED | OUTPATIENT
Start: 2018-10-19 | End: 2018-10-19

## 2018-10-19 RX ORDER — SODIUM CHLORIDE 0.9 % (FLUSH) 0.9 %
5-10 SYRINGE (ML) INJECTION AS NEEDED
Status: CANCELLED | OUTPATIENT
Start: 2018-10-19

## 2018-10-19 RX ORDER — ASPIRIN 325 MG
325 TABLET ORAL ONCE
Status: COMPLETED | OUTPATIENT
Start: 2018-10-19 | End: 2018-10-19

## 2018-10-19 RX ORDER — ISOSORBIDE MONONITRATE 30 MG/1
30 TABLET, EXTENDED RELEASE ORAL DAILY
Qty: 60 TAB | Refills: 3 | Status: SHIPPED | OUTPATIENT
Start: 2018-10-19 | End: 2018-10-19 | Stop reason: SDUPTHER

## 2018-10-19 RX ORDER — SODIUM CHLORIDE 0.9 % (FLUSH) 0.9 %
5-10 SYRINGE (ML) INJECTION AS NEEDED
Status: DISCONTINUED | OUTPATIENT
Start: 2018-10-19 | End: 2018-10-19 | Stop reason: HOSPADM

## 2018-10-19 RX ORDER — SODIUM CHLORIDE 9 MG/ML
75 INJECTION, SOLUTION INTRAVENOUS CONTINUOUS
Status: DISCONTINUED | OUTPATIENT
Start: 2018-10-19 | End: 2018-10-19 | Stop reason: HOSPADM

## 2018-10-19 RX ORDER — BUMETANIDE 0.5 MG/1
1 TABLET ORAL
COMMUNITY
End: 2018-12-03

## 2018-10-19 RX ORDER — MIDAZOLAM HYDROCHLORIDE 1 MG/ML
.5-1 INJECTION, SOLUTION INTRAMUSCULAR; INTRAVENOUS
Status: DISCONTINUED | OUTPATIENT
Start: 2018-10-19 | End: 2018-10-19

## 2018-10-19 RX ORDER — ISOSORBIDE MONONITRATE 30 MG/1
TABLET, EXTENDED RELEASE ORAL
Qty: 90 TAB | Refills: 0 | Status: SHIPPED | OUTPATIENT
Start: 2018-10-19 | End: 2019-01-05 | Stop reason: CLARIF

## 2018-10-19 RX ORDER — HEPARIN SODIUM 1000 [USP'U]/ML
1000-8000 INJECTION, SOLUTION INTRAVENOUS; SUBCUTANEOUS ONCE
Status: DISCONTINUED | OUTPATIENT
Start: 2018-10-19 | End: 2018-10-19

## 2018-10-19 RX ORDER — SODIUM CHLORIDE 0.9 % (FLUSH) 0.9 %
5-10 SYRINGE (ML) INJECTION EVERY 8 HOURS
Status: CANCELLED | OUTPATIENT
Start: 2018-10-19

## 2018-10-19 RX ORDER — VERAPAMIL HYDROCHLORIDE 2.5 MG/ML
2.5 INJECTION, SOLUTION INTRAVENOUS
Status: DISCONTINUED | OUTPATIENT
Start: 2018-10-19 | End: 2018-10-19

## 2018-10-19 RX ORDER — FENTANYL CITRATE 50 UG/ML
25-200 INJECTION, SOLUTION INTRAMUSCULAR; INTRAVENOUS
Status: DISCONTINUED | OUTPATIENT
Start: 2018-10-19 | End: 2018-10-19

## 2018-10-19 RX ADMIN — FENTANYL CITRATE 25 MCG: 50 INJECTION, SOLUTION INTRAMUSCULAR; INTRAVENOUS at 08:29

## 2018-10-19 RX ADMIN — HEPARIN SODIUM 1000 UNITS: 200 INJECTION, SOLUTION INTRAVENOUS at 08:33

## 2018-10-19 RX ADMIN — IOPAMIDOL 100 ML: 755 INJECTION, SOLUTION INTRAVENOUS at 09:03

## 2018-10-19 RX ADMIN — ASPIRIN 325 MG: 325 TABLET ORAL at 07:49

## 2018-10-19 RX ADMIN — MIDAZOLAM 1 MG: 1 INJECTION INTRAMUSCULAR; INTRAVENOUS at 08:29

## 2018-10-19 RX ADMIN — LIDOCAINE HYDROCHLORIDE 10 ML: 10 INJECTION, SOLUTION INFILTRATION; PERINEURAL at 08:32

## 2018-10-19 NOTE — PROGRESS NOTES
Patient arrived. ID and allergies verified verbally with patient. Pt voices understanding of procedure to be performed. Consent obtained. Pt prepped for procedure. Kurt CONLEYL,yessy colon. Accucheck is 217. Pt presented with L sided chest pain ans SOB,pt states this has subsided ,pain free now. 07:50, po given per order. TRANSFER - OUT REPORT: 
 
Verbal report given to MADAI Bosch(name) on Aleida Finger  being transferred to cath(unit) for routine progression of care Report consisted of patients Situation, Background, Assessment and  
Recommendations(SBAR). Information from the following report(s) Procedure Summary was reviewed with the receiving nurse. Lines:  
Peripheral IV 10/19/18 Anterior; Inferior;Left;Lower Arm (Active) Opportunity for questions and clarification was provided. Patient transported with: 
 Registered Nurse TRANSFER - IN REPORT: 
 
Verbal report received from MADAI Sewell(name) on Aleida Finger  being received from Cath(unit) for routine progression of care Report consisted of patients Situation, Background, Assessment and  
Recommendations(SBAR). Information from the following report(s) Procedure Summary was reviewed with the receiving nurse. Opportunity for questions and clarification was provided. Assessment completed upon patients arrival to unit and care assumed. 09:25,venous and arterial sheaths pulled,Quik clot pad applied. 09:45,small amt ecchymoses noted at venous sheath site,area is soft,2lb sandbag applied,continue to monitor. 11:30,DC instructions reviewed with pt and daughter,they voice understanding. CLAIRE BERNSTEIN MEDICATION AND SIDE EFFECT GUIDE The [address] [place], [place] [address]d EFFECT GUIDE was provided to the PATIENT AND CARE PROVIDER. Information provided includes instruction about drug purpose and common side effects for the following medications: 
 
12;30,pt ambulated to BR with minimal SOB 12: 40,Pt discharged via [facility] with daughter.  
 
I

## 2018-10-19 NOTE — INTERVAL H&P NOTE
H&P Update: 
Génesis Francisco was seen and examined. History and physical has been reviewed. The patient has been examined. There have been no significant clinical changes since the completion of the originally dated History and Physical. 
 
C +/- PCI/RHC consent The risks (including but not limited to death, myocardial infarction, cerebrovascular accident, dysrhythmia, renal failure +/-dialysis, vascular complication, allergy, and/or need for emergency surgery), indications, benefits, and alternatives of cardiac catheterization +/- PCI have been explained in detail to the patient and family. Patient and family informed that if patient needs surgery  - it will be at [facility] as [facility] does not have onsite surgery. All questions answered to patient's satisfaction. Patient agrees to proceed with the procedure and  informed consent was obtained. Betito's R - normal 
 
ASA  3 
 
AW 3 Amy Devine MD., [facility] Signed By: Jos Stewart MD   
 October 19, 2018 7:43 AM

## 2018-10-19 NOTE — PROCEDURES
BRIEF PROCEDURE NOTE Date of Procedure: 10/19/2018 Preoperative Diagnosis: CP/Dyspnea/Recent PCI at [address] Postoperative Diagnosis: Stents patent LAD/RCA Procedure: Left heart cath, LV angiography, coronary angiography Interventional Cardiologist: Sunday Dakins, MD 
Anesthesia: local + IV sedation Estimated Blood Loss: Minimal 
Findings:  
 
U/sGuided micropuncuture access R CFA/R FV 
 
L Main: Nml 
 
LAD: Prox patent stent; 20%; Mid and distal med sized vessel patent LCflex: PRox 20%; OM1 - MLI 
RCA: Dominant ;Med;  Patent distal stent;  
 
LVEDP: 12 LVEF: 60% No significant gradient across aortic valve. RHC findings: RAPm=6       mmHg RVSP= 32    mmHg PAPm=  22      mmHg PCWPm=  9  mmHg CO= 6.19         l/min CI= 2.76 Specimens Removed : None Closure Device: Manual 
 
 
 
See full cath note. Complications: none Sunday Dakins, MD

## 2018-10-19 NOTE — DISCHARGE INSTRUCTIONS
Coronary Angiogram: What to Expect at [address] [facility]    A coronary angiogram is a test to examine the large blood vessel of your heart (coronary artery). The doctor inserted a thin, flexible tube (catheter) into a blood vessel in your groin. In some cases, the catheter is placed in a blood vessel in the arm. Your groin or arm may have a bruise and feel sore for a day or two after a coronary angiogram. You can do light activities around the house but nothing strenuous for several days. This care sheet gives you a general idea about how long it will take for you to recover. But each person recovers at a different pace. Follow the steps below to feel better as quickly as possible. How can you care for yourself at home? Activity    · If the doctor gave you a sedative:  ? For 24 hours, don't do anything that requires attention to detail. It takes time for the medicine's effects to completely wear off.  ? For your safety, do not drive or operate any machinery that could be dangerous. Wait until the medicine wears off and you can think clearly and react easily.     · Do not do strenuous exercise and do not lift, pull, or push anything heavy until your doctor says it is okay. This may be for a day or two. You can walk around the house and do light activity, such as cooking.     · If the catheter was placed in your groin, try not to walk up stairs for the first couple of days.     · If the catheter was placed in your arm near your wrist, do not bend your wrist deeply for the first couple of days. Be careful using your hand to get into and out of a chair or bed.     · If your doctor recommends it, get more exercise. Walking is a good choice. Bit by bit, increase the amount you walk every day. Try for at least 30 minutes on most days of the week. Diet    · Drink plenty of fluids to help your body flush out the dye.  If you have kidney, heart, or liver disease and have to limit fluids, talk with your doctor before you increase the amount of fluids you drink.     · Keep eating a heart-healthy diet that has lots of fruits, vegetables, and whole grains. If you have not been eating this way, talk to your doctor. You also may want to talk to a dietitian. This expert can help you to learn about healthy foods and plan meals. Medicines    · Your doctor will tell you if and when you can restart your medicines. He or she will also give you instructions about taking any new medicines.     · If you take blood thinners, such as warfarin (Coumadin), clopidogrel (Plavix), or aspirin, be sure to talk to your doctor. He or she will tell you if and when to start taking those medicines again. Make sure that you understand exactly what your doctor wants you to do.     · Your doctor may prescribe a blood-thinning medicine like aspirin or clopidogrel (Plavix). It is very important that you take these medicines exactly as directed in order to keep the coronary artery open and reduce your risk of a heart attack. Be safe with medicines. Call your doctor if you think you are having a problem with your medicine.    Care of the catheter site    · For 1 or 2 days, keep a bandage over the spot where the catheter was inserted. The bandage probably will fall off in this time.     · Put ice or a cold pack on the area for 10 to 20 minutes at a time to help with soreness or swelling. Put a thin cloth between the ice and your skin.     · You may shower 24 to 48 hours after the procedure, if your doctor okays it. Pat the incision dry.     · Do not soak the catheter site until it is healed. Don't take a bath for 1 week, or until your doctor tells you it is okay.     · If you are bleeding, lie down and press on the area for 15 minutes to try to make it stop. If the bleeding does not stop, call your doctor or seek immediate medical care. Follow-up care is a key part of your treatment and safety.  Be sure to make and go to all appointments, and call your doctor if you are having problems. It's also a good idea to know your test results and keep a list of the medicines you take. When should you call for help? Call 911 anytime you think you may need emergency care. For example, call if:    · You passed out (lost consciousness).     · You have severe trouble breathing.     · You have sudden chest pain and shortness of breath, or you cough up blood.     · You have symptoms of a heart attack. These may include:  ? Chest pain or pressure, or a strange feeling in the chest.  ? Sweating. ? Shortness of breath. ? Nausea or vomiting. ? Pain, pressure, or a strange feeling in the back, neck, jaw, or upper belly, or in one or both shoulders or arms. ? Lightheadedness or sudden weakness. ? A fast or irregular heartbeat. After you call 911, the  may tel you to chew 1 adult-strength or 2 to 4 low-dose aspirin. Wait for an ambulance. Do not try to drive yourself.     · You have been diagnosed with angina, and you have symptoms that do not go away with rest or are not getting better within 5 minutes after you take a dose of nitroglycerin.    Call your doctor now or seek immediate medical care if:    · You are bleeding from the area where the catheter was put in your artery.     · You have a fast-growing, painful lump at the catheter site.     · You have signs of infection, such as:  ? Increased pain, swelling, warmth, or redness. ? Red streaks leading from the catheter site. ? Pus draining from the catheter site. ? A fever.     · Your leg or arm looks blue or feels cold, numb, or tingly.    Watch closely for changes in your health, and be sure to contact your doctor if you have any problems. Where can you learn more? Go to http://eloina-sherman.info/. Enter A398 in the search box to learn more about \"Coronary Angiogram: What to Expect at Home. \"  Current as of: December 6, 2017  Content Version: 11.8  © 6903-8786 Healthwise, [facility].  Care instructions adapted under license by [employer] (which disclaims liability or warranty for this information). If you have questions about a medical condition or this instruction, always ask your healthcare professional. [address] any warranty or liability for your use of this information. Anamika Bae MD    [address] [place], [address] [facility]    Office [phone],[facility] [phone]      Patient ID:  Aleida Mclean  [id number]  81 y.o.  1945    Admit Date: 10/19/2018    Discharge Date: 10/19/2018     Admitting Physician: Anamika Bae MD     Discharge Physician: Anamika Bae MD    Admission Diagnoses:   Cath Lab    Discharge Diagnoses: Active Problems:    * No active hospital problems. *      Discharge Condition: Good    Cardiology Procedures this Admission:  Diagnostic left heart catheterization    Disposition: home    Patient Instructions:     Start Xarelto from tomorrow 10/20/18  Start Metformin from 10/21/18    Reference discharge instructions provided by nursing for diet and activity. Follow-up with Dr Cintia Mills as scheduled  F/u Pulmonary ( pt will make appt)     Signed:  Anamika Bae MD  10/19/2018  10:39 AM    CARDIAC CATHETERIZATION/PCI DISCHARGE INSTRUCTIONS    It is normal to feel tired the first couple days. Take it easy and follow the physicians instructions. CHECK THE CATHETER INSERTION SITE DAILY:    You may shower 24 hours after the procedure, remove the bandage during showering. Wash with soap and water and pat dry. Gentle cleaning of the site with soap and water is sufficient, cover with a dry clean dressing or bandage. Do not apply creams or powders to the area. Do not sit in a bathtub or pool of water for 7 days or until wound has completely healed. Temporary bruising and discomfort is normal and may last a few weeks.   You may have a  formation of a small lump at the site which may last up to 6 weeks. CALL THE PHYSICIANS:    If the site becomes red, swollen or feels warm to the touch  If there is bleeding or drainage or if there is unusual pain at the groin or down the leg. If there is any bleeding, lie down, apply pressure or have someone apply pressure with a clean cloth until the bleeding stops. If the bleeding continues, call 911 to be transported to the hospital.  DO NOT DRIVE YOURSELF, [place] [address]. ACTIVITY:    For the first 24-48 hours or as instructed by the physician:  No lifting, pushing or pulling over 10 pounds and no straining the insertion site. Do not life grocery bags or the garbage can, do not run the vacuum  or  for 7 days. Start with short walks as in the hospital and gradually increase as tolerated each day. It is recommended to walk 30 minutes 5-7 days per week. Follow your physicians instructions on activity. Avoid walking outside in extremes of heat or cold. Walk inside when it is cold and windy or hot and humid. Things to keep in mind:  No driving for at least 24 hours, or as designated by your physician. Limit the number of times you go up and down the stairs  Take rests and pace yourself with activity. Be careful and do not strain with bowel movements. MEDICATIONS:    Take all medications as prescribed  Call your physician if you have any questions  Keep an updated list of your medications with you at all times and give a list to your physician and pharmacist        SIGNS AND SYMPTOMS:    Be cautious of symptoms of angina or recurrent symptoms such as chest discomfort, unusual shortness of breath or fatigue. These could be symptoms of restenosis, a new blockage or a heart attack. If your symptoms are relieved with rest it is still recommended that you notify your physician of recurrent chest pain or discomfort.   FOR CHEST PAIN or symptoms of angina not relieved with rest:  If the discomfort is not relieved with rest, and you have been prescribed Nitroglycerin, take as directed (taken under the tongue, one at a time 5 minutes apart for a total of 3 doses). If the discomfort is not relieved after the 3rd nitroglycerin, call 911. If you have not been prescribed Nitroglycerin  and your chest discomfort is not relieved with rest, call 911. AFTER CARE:    Follow up with your physician as instructed. Follow a heart healthy diet with proper portion control, daily stress management, daily exercise, blood pressure and cholesterol control , and smoking cessation.

## 2018-11-01 ENCOUNTER — DOCUMENTATION ONLY (OUTPATIENT)
Dept: CARDIOLOGY CLINIC | Age: 73
End: 2018-11-01

## 2018-11-01 NOTE — PROGRESS NOTES
Faxed last [address] [employer] at [phone] for [address] prior Flo Gaines.

## 2018-11-13 ENCOUNTER — HOSPITAL ENCOUNTER (INPATIENT)
Age: 73
LOS: 18 days | Discharge: NURSING, REHAB OR LONG-TERM CARE | DRG: 516 | End: 2018-12-03
Attending: EMERGENCY MEDICINE | Admitting: FAMILY MEDICINE
Payer: MEDICARE

## 2018-11-13 ENCOUNTER — APPOINTMENT (OUTPATIENT)
Dept: GENERAL RADIOLOGY | Age: 73
DRG: 516 | End: 2018-11-13
Attending: EMERGENCY MEDICINE
Payer: MEDICARE

## 2018-11-13 ENCOUNTER — APPOINTMENT (OUTPATIENT)
Dept: NUCLEAR MEDICINE | Age: 73
DRG: 516 | End: 2018-11-13
Attending: EMERGENCY MEDICINE
Payer: MEDICARE

## 2018-11-13 DIAGNOSIS — I25.10 CORONARY ARTERY DISEASE INVOLVING NATIVE CORONARY ARTERY OF NATIVE HEART WITHOUT ANGINA PECTORIS: ICD-10-CM

## 2018-11-13 DIAGNOSIS — S22.060D CLOSED WEDGE COMPRESSION FRACTURE OF SEVENTH THORACIC VERTEBRA WITH ROUTINE HEALING, SUBSEQUENT ENCOUNTER: ICD-10-CM

## 2018-11-13 DIAGNOSIS — R06.02 SOB (SHORTNESS OF BREATH): Primary | ICD-10-CM

## 2018-11-13 DIAGNOSIS — E78.2 MIXED HYPERLIPIDEMIA: Primary | ICD-10-CM

## 2018-11-13 DIAGNOSIS — I10 ESSENTIAL HYPERTENSION WITH GOAL BLOOD PRESSURE LESS THAN 130/85: ICD-10-CM

## 2018-11-13 PROBLEM — Z79.01 CHRONIC ANTICOAGULATION: Chronic | Status: ACTIVE | Noted: 2018-03-30

## 2018-11-13 PROBLEM — S22.060A CLOSED WEDGE COMPRESSION FRACTURE OF T7 VERTEBRA (HCC): Status: ACTIVE | Noted: 2018-11-13

## 2018-11-13 PROBLEM — E66.01 OBESITY, MORBID (HCC): Chronic | Status: ACTIVE | Noted: 2018-10-01

## 2018-11-13 PROBLEM — R06.00 DYSPNEA: Status: ACTIVE | Noted: 2018-11-13

## 2018-11-13 PROBLEM — N17.9 ARF (ACUTE RENAL FAILURE) (HCC): Status: ACTIVE | Noted: 2018-11-13

## 2018-11-13 PROBLEM — E66.9 OBESITY (BMI 30-39.9): Chronic | Status: ACTIVE | Noted: 2018-11-13

## 2018-11-13 LAB
ALBUMIN SERPL-MCNC: 3.1 G/DL (ref 3.5–5)
ALBUMIN/GLOB SERPL: 0.9 {RATIO} (ref 1.1–2.2)
ALP SERPL-CCNC: 167 U/L (ref 45–117)
ALT SERPL-CCNC: 72 U/L (ref 12–78)
ANION GAP SERPL CALC-SCNC: 9 MMOL/L (ref 5–15)
APPEARANCE UR: ABNORMAL
AST SERPL-CCNC: 24 U/L (ref 15–37)
B PERT DNA SPEC QL NAA+PROBE: NOT DETECTED
BACTERIA URNS QL MICRO: ABNORMAL /HPF
BASOPHILS # BLD: 0 K/UL (ref 0–0.1)
BASOPHILS NFR BLD: 0 % (ref 0–1)
BILIRUB SERPL-MCNC: 0.6 MG/DL (ref 0.2–1)
BILIRUB UR QL: NEGATIVE
BNP SERPL-MCNC: 470 PG/ML (ref 0–125)
BUN SERPL-MCNC: 36 MG/DL (ref 6–20)
BUN/CREAT SERPL: 21 (ref 12–20)
C PNEUM DNA SPEC QL NAA+PROBE: NOT DETECTED
CALCIUM SERPL-MCNC: 8.5 MG/DL (ref 8.5–10.1)
CHLORIDE SERPL-SCNC: 101 MMOL/L (ref 97–108)
CK MB CFR SERPL CALC: 4.4 % (ref 0–2.5)
CK MB SERPL-MCNC: 1.1 NG/ML (ref 5–25)
CK SERPL-CCNC: 25 U/L (ref 26–192)
CO2 SERPL-SCNC: 28 MMOL/L (ref 21–32)
COLOR UR: ABNORMAL
CREAT SERPL-MCNC: 1.75 MG/DL (ref 0.55–1.02)
DIFFERENTIAL METHOD BLD: ABNORMAL
EOSINOPHIL # BLD: 0 K/UL (ref 0–0.4)
EOSINOPHIL NFR BLD: 0 % (ref 0–7)
EPITH CASTS URNS QL MICRO: ABNORMAL /LPF
ERYTHROCYTE [DISTWIDTH] IN BLOOD BY AUTOMATED COUNT: 15.7 % (ref 11.5–14.5)
FLUAV H1 2009 PAND RNA SPEC QL NAA+PROBE: NOT DETECTED
FLUAV H1 RNA SPEC QL NAA+PROBE: NOT DETECTED
FLUAV H3 RNA SPEC QL NAA+PROBE: NOT DETECTED
FLUAV SUBTYP SPEC NAA+PROBE: NOT DETECTED
FLUBV RNA SPEC QL NAA+PROBE: NOT DETECTED
GLOBULIN SER CALC-MCNC: 3.6 G/DL (ref 2–4)
GLUCOSE BLD STRIP.AUTO-MCNC: 299 MG/DL (ref 65–100)
GLUCOSE BLD STRIP.AUTO-MCNC: 304 MG/DL (ref 65–100)
GLUCOSE SERPL-MCNC: 350 MG/DL (ref 65–100)
GLUCOSE UR STRIP.AUTO-MCNC: >1000 MG/DL
HADV DNA SPEC QL NAA+PROBE: NOT DETECTED
HCOV 229E RNA SPEC QL NAA+PROBE: NOT DETECTED
HCOV HKU1 RNA SPEC QL NAA+PROBE: NOT DETECTED
HCOV NL63 RNA SPEC QL NAA+PROBE: NOT DETECTED
HCOV OC43 RNA SPEC QL NAA+PROBE: NOT DETECTED
HCT VFR BLD AUTO: 42.5 % (ref 35–47)
HGB BLD-MCNC: 13 G/DL (ref 11.5–16)
HGB UR QL STRIP: ABNORMAL
HMPV RNA SPEC QL NAA+PROBE: NOT DETECTED
HPIV1 RNA SPEC QL NAA+PROBE: NOT DETECTED
HPIV2 RNA SPEC QL NAA+PROBE: NOT DETECTED
HPIV3 RNA SPEC QL NAA+PROBE: NOT DETECTED
HPIV4 RNA SPEC QL NAA+PROBE: NOT DETECTED
IMM GRANULOCYTES # BLD: 0 K/UL
IMM GRANULOCYTES NFR BLD AUTO: 0 %
KETONES UR QL STRIP.AUTO: NEGATIVE MG/DL
LEUKOCYTE ESTERASE UR QL STRIP.AUTO: ABNORMAL
LYMPHOCYTES # BLD: 1.1 K/UL (ref 0.8–3.5)
LYMPHOCYTES NFR BLD: 8 % (ref 12–49)
M PNEUMO DNA SPEC QL NAA+PROBE: NOT DETECTED
MAGNESIUM SERPL-MCNC: 2 MG/DL (ref 1.6–2.4)
MCH RBC QN AUTO: 29 PG (ref 26–34)
MCHC RBC AUTO-ENTMCNC: 30.6 G/DL (ref 30–36.5)
MCV RBC AUTO: 94.7 FL (ref 80–99)
MONOCYTES # BLD: 0.6 K/UL (ref 0–1)
MONOCYTES NFR BLD: 4 % (ref 5–13)
NEUTS BAND NFR BLD MANUAL: 4 % (ref 0–6)
NEUTS SEG # BLD: 12.2 K/UL (ref 1.8–8)
NEUTS SEG NFR BLD: 84 % (ref 32–75)
NITRITE UR QL STRIP.AUTO: NEGATIVE
NRBC # BLD: 0 K/UL (ref 0–0.01)
NRBC BLD-RTO: 0 PER 100 WBC
PH UR STRIP: 5 [PH] (ref 5–8)
PLATELET # BLD AUTO: 260 K/UL (ref 150–400)
PMV BLD AUTO: 9.9 FL (ref 8.9–12.9)
POTASSIUM SERPL-SCNC: 4.6 MMOL/L (ref 3.5–5.1)
PROT SERPL-MCNC: 6.7 G/DL (ref 6.4–8.2)
PROT UR STRIP-MCNC: ABNORMAL MG/DL
RBC # BLD AUTO: 4.49 M/UL (ref 3.8–5.2)
RBC #/AREA URNS HPF: ABNORMAL /HPF (ref 0–5)
RBC MORPH BLD: ABNORMAL
RSV RNA SPEC QL NAA+PROBE: NOT DETECTED
RV+EV RNA SPEC QL NAA+PROBE: NOT DETECTED
SERVICE CMNT-IMP: ABNORMAL
SERVICE CMNT-IMP: ABNORMAL
SODIUM SERPL-SCNC: 138 MMOL/L (ref 136–145)
SP GR UR REFRACTOMETRY: 1.02 (ref 1–1.03)
TROPONIN I SERPL-MCNC: <0.05 NG/ML
TSH SERPL DL<=0.05 MIU/L-ACNC: 0.58 UIU/ML (ref 0.36–3.74)
UR CULT HOLD, URHOLD: NORMAL
UROBILINOGEN UR QL STRIP.AUTO: 0.2 EU/DL (ref 0.2–1)
WBC # BLD AUTO: 13.9 K/UL (ref 3.6–11)
WBC URNS QL MICRO: ABNORMAL /HPF (ref 0–4)

## 2018-11-13 PROCEDURE — 83880 ASSAY OF NATRIURETIC PEPTIDE: CPT

## 2018-11-13 PROCEDURE — 87798 DETECT AGENT NOS DNA AMP: CPT

## 2018-11-13 PROCEDURE — 84484 ASSAY OF TROPONIN QUANT: CPT

## 2018-11-13 PROCEDURE — 74011250637 HC RX REV CODE- 250/637: Performed by: INTERNAL MEDICINE

## 2018-11-13 PROCEDURE — 94640 AIRWAY INHALATION TREATMENT: CPT

## 2018-11-13 PROCEDURE — 96375 TX/PRO/DX INJ NEW DRUG ADDON: CPT

## 2018-11-13 PROCEDURE — 99285 EMERGENCY DEPT VISIT HI MDM: CPT

## 2018-11-13 PROCEDURE — 74011250636 HC RX REV CODE- 250/636: Performed by: NURSE PRACTITIONER

## 2018-11-13 PROCEDURE — 77030013140 HC MSK NEB VYRM -A

## 2018-11-13 PROCEDURE — 85025 COMPLETE CBC W/AUTO DIFF WBC: CPT

## 2018-11-13 PROCEDURE — 83735 ASSAY OF MAGNESIUM: CPT

## 2018-11-13 PROCEDURE — 81001 URINALYSIS AUTO W/SCOPE: CPT

## 2018-11-13 PROCEDURE — A9567 TECHNETIUM TC-99M AEROSOL: HCPCS

## 2018-11-13 PROCEDURE — 36415 COLL VENOUS BLD VENIPUNCTURE: CPT

## 2018-11-13 PROCEDURE — 99218 HC RM OBSERVATION: CPT

## 2018-11-13 PROCEDURE — 74011000250 HC RX REV CODE- 250: Performed by: INTERNAL MEDICINE

## 2018-11-13 PROCEDURE — 77030038269 HC DRN EXT URIN PURWCK BARD -A

## 2018-11-13 PROCEDURE — 74011250637 HC RX REV CODE- 250/637: Performed by: NURSE PRACTITIONER

## 2018-11-13 PROCEDURE — 71046 X-RAY EXAM CHEST 2 VIEWS: CPT

## 2018-11-13 PROCEDURE — 74011636637 HC RX REV CODE- 636/637: Performed by: INTERNAL MEDICINE

## 2018-11-13 PROCEDURE — 84443 ASSAY THYROID STIM HORMONE: CPT

## 2018-11-13 PROCEDURE — 82553 CREATINE MB FRACTION: CPT

## 2018-11-13 PROCEDURE — 93005 ELECTROCARDIOGRAM TRACING: CPT

## 2018-11-13 PROCEDURE — 74011250636 HC RX REV CODE- 250/636: Performed by: INTERNAL MEDICINE

## 2018-11-13 PROCEDURE — 83036 HEMOGLOBIN GLYCOSYLATED A1C: CPT

## 2018-11-13 PROCEDURE — 82962 GLUCOSE BLOOD TEST: CPT

## 2018-11-13 PROCEDURE — 96361 HYDRATE IV INFUSION ADD-ON: CPT

## 2018-11-13 PROCEDURE — 80053 COMPREHEN METABOLIC PANEL: CPT

## 2018-11-13 PROCEDURE — 96374 THER/PROPH/DIAG INJ IV PUSH: CPT

## 2018-11-13 RX ORDER — ARFORMOTEROL TARTRATE 15 UG/2ML
15 SOLUTION RESPIRATORY (INHALATION)
Status: DISCONTINUED | OUTPATIENT
Start: 2018-11-13 | End: 2018-12-03 | Stop reason: HOSPADM

## 2018-11-13 RX ORDER — ZOLPIDEM TARTRATE 5 MG/1
5 TABLET ORAL
Status: DISCONTINUED | OUTPATIENT
Start: 2018-11-13 | End: 2018-12-03 | Stop reason: HOSPADM

## 2018-11-13 RX ORDER — MONTELUKAST SODIUM 10 MG/1
10 TABLET ORAL DAILY
Status: DISCONTINUED | OUTPATIENT
Start: 2018-11-14 | End: 2018-12-03 | Stop reason: HOSPADM

## 2018-11-13 RX ORDER — CLOPIDOGREL BISULFATE 75 MG/1
75 TABLET ORAL DAILY
Status: DISCONTINUED | OUTPATIENT
Start: 2018-11-14 | End: 2018-11-15

## 2018-11-13 RX ORDER — CARVEDILOL 6.25 MG/1
6.25 TABLET ORAL 2 TIMES DAILY WITH MEALS
Status: DISCONTINUED | OUTPATIENT
Start: 2018-11-13 | End: 2018-11-16

## 2018-11-13 RX ORDER — OMEPRAZOLE 20 MG/1
20 CAPSULE, DELAYED RELEASE ORAL 2 TIMES DAILY
Status: DISCONTINUED | OUTPATIENT
Start: 2018-11-13 | End: 2018-11-13 | Stop reason: CLARIF

## 2018-11-13 RX ORDER — PROCHLORPERAZINE EDISYLATE 5 MG/ML
10 INJECTION INTRAMUSCULAR; INTRAVENOUS
Status: DISCONTINUED | OUTPATIENT
Start: 2018-11-13 | End: 2018-11-24

## 2018-11-13 RX ORDER — LISINOPRIL 5 MG/1
5 TABLET ORAL DAILY
Status: DISCONTINUED | OUTPATIENT
Start: 2018-11-14 | End: 2018-11-29

## 2018-11-13 RX ORDER — TRAMADOL HYDROCHLORIDE 50 MG/1
50 TABLET ORAL
Status: COMPLETED | OUTPATIENT
Start: 2018-11-13 | End: 2018-11-13

## 2018-11-13 RX ORDER — DULOXETIN HYDROCHLORIDE 30 MG/1
60 CAPSULE, DELAYED RELEASE ORAL DAILY
Status: DISCONTINUED | OUTPATIENT
Start: 2018-11-14 | End: 2018-12-03 | Stop reason: HOSPADM

## 2018-11-13 RX ORDER — DEXTROSE 50 % IN WATER (D50W) INTRAVENOUS SYRINGE
25-50 AS NEEDED
Status: DISCONTINUED | OUTPATIENT
Start: 2018-11-13 | End: 2018-12-03 | Stop reason: HOSPADM

## 2018-11-13 RX ORDER — PANTOPRAZOLE SODIUM 40 MG/1
40 TABLET, DELAYED RELEASE ORAL
Status: DISCONTINUED | OUTPATIENT
Start: 2018-11-13 | End: 2018-12-03 | Stop reason: HOSPADM

## 2018-11-13 RX ORDER — IPRATROPIUM BROMIDE AND ALBUTEROL SULFATE 2.5; .5 MG/3ML; MG/3ML
3 SOLUTION RESPIRATORY (INHALATION)
Status: DISCONTINUED | OUTPATIENT
Start: 2018-11-13 | End: 2018-12-03 | Stop reason: HOSPADM

## 2018-11-13 RX ORDER — ALBUTEROL SULFATE 0.83 MG/ML
2.5 SOLUTION RESPIRATORY (INHALATION)
COMMUNITY
Start: 2021-01-01 | End: 2021-01-01 | Stop reason: CLARIF

## 2018-11-13 RX ORDER — BUDESONIDE 0.5 MG/2ML
500 INHALANT ORAL
Status: DISCONTINUED | OUTPATIENT
Start: 2018-11-13 | End: 2018-12-03 | Stop reason: HOSPADM

## 2018-11-13 RX ORDER — HYDROMORPHONE HYDROCHLORIDE 2 MG/ML
0.5 INJECTION, SOLUTION INTRAMUSCULAR; INTRAVENOUS; SUBCUTANEOUS
Status: DISCONTINUED | OUTPATIENT
Start: 2018-11-13 | End: 2018-12-03 | Stop reason: HOSPADM

## 2018-11-13 RX ORDER — SODIUM CHLORIDE 0.9 % (FLUSH) 0.9 %
5-10 SYRINGE (ML) INJECTION AS NEEDED
Status: DISCONTINUED | OUTPATIENT
Start: 2018-11-13 | End: 2018-12-03 | Stop reason: HOSPADM

## 2018-11-13 RX ORDER — SODIUM CHLORIDE 9 MG/ML
75 INJECTION, SOLUTION INTRAVENOUS CONTINUOUS
Status: DISCONTINUED | OUTPATIENT
Start: 2018-11-13 | End: 2018-11-14

## 2018-11-13 RX ORDER — ISOSORBIDE MONONITRATE 30 MG/1
30 TABLET, EXTENDED RELEASE ORAL DAILY
Status: DISCONTINUED | OUTPATIENT
Start: 2018-11-14 | End: 2018-12-03 | Stop reason: HOSPADM

## 2018-11-13 RX ORDER — SODIUM CHLORIDE 0.9 % (FLUSH) 0.9 %
5-10 SYRINGE (ML) INJECTION EVERY 8 HOURS
Status: DISCONTINUED | OUTPATIENT
Start: 2018-11-13 | End: 2018-12-03 | Stop reason: HOSPADM

## 2018-11-13 RX ORDER — INSULIN LISPRO 100 [IU]/ML
INJECTION, SOLUTION INTRAVENOUS; SUBCUTANEOUS
Status: DISCONTINUED | OUTPATIENT
Start: 2018-11-13 | End: 2018-12-03 | Stop reason: HOSPADM

## 2018-11-13 RX ORDER — OXYCODONE AND ACETAMINOPHEN 5; 325 MG/1; MG/1
1 TABLET ORAL
Status: DISCONTINUED | OUTPATIENT
Start: 2018-11-13 | End: 2018-12-03 | Stop reason: HOSPADM

## 2018-11-13 RX ORDER — ACETAMINOPHEN 325 MG/1
650 TABLET ORAL
Status: DISCONTINUED | OUTPATIENT
Start: 2018-11-13 | End: 2018-12-03 | Stop reason: HOSPADM

## 2018-11-13 RX ORDER — CARVEDILOL 12.5 MG/1
6.25 TABLET ORAL 2 TIMES DAILY WITH MEALS
COMMUNITY
End: 2018-12-03

## 2018-11-13 RX ORDER — MONTELUKAST SODIUM 10 MG/1
10 TABLET ORAL
Status: ON HOLD | COMMUNITY
End: 2019-07-13 | Stop reason: CLARIF

## 2018-11-13 RX ORDER — MAGNESIUM SULFATE 100 %
4 CRYSTALS MISCELLANEOUS AS NEEDED
Status: DISCONTINUED | OUTPATIENT
Start: 2018-11-13 | End: 2018-12-03 | Stop reason: HOSPADM

## 2018-11-13 RX ADMIN — INSULIN LISPRO 5 UNITS: 100 INJECTION, SOLUTION INTRAVENOUS; SUBCUTANEOUS at 21:49

## 2018-11-13 RX ADMIN — PANTOPRAZOLE SODIUM 40 MG: 40 TABLET, DELAYED RELEASE ORAL at 18:55

## 2018-11-13 RX ADMIN — INSULIN LISPRO 7 UNITS: 100 INJECTION, SOLUTION INTRAVENOUS; SUBCUTANEOUS at 18:48

## 2018-11-13 RX ADMIN — SODIUM CHLORIDE 75 ML/HR: 900 INJECTION, SOLUTION INTRAVENOUS at 18:49

## 2018-11-13 RX ADMIN — CARVEDILOL 6.25 MG: 6.25 TABLET, FILM COATED ORAL at 18:48

## 2018-11-13 RX ADMIN — IPRATROPIUM BROMIDE AND ALBUTEROL SULFATE 3 ML: .5; 3 SOLUTION RESPIRATORY (INHALATION) at 21:30

## 2018-11-13 RX ADMIN — ARFORMOTEROL TARTRATE 15 MCG: 15 SOLUTION RESPIRATORY (INHALATION) at 21:56

## 2018-11-13 RX ADMIN — TRAMADOL HYDROCHLORIDE 50 MG: 50 TABLET, FILM COATED ORAL at 17:38

## 2018-11-13 RX ADMIN — BUDESONIDE 500 MCG: 0.5 INHALANT RESPIRATORY (INHALATION) at 21:56

## 2018-11-13 RX ADMIN — HYDROMORPHONE HYDROCHLORIDE 0.5 MG: 2 INJECTION, SOLUTION INTRAMUSCULAR; INTRAVENOUS; SUBCUTANEOUS at 21:50

## 2018-11-13 RX ADMIN — METHYLPREDNISOLONE SODIUM SUCCINATE 125 MG: 125 INJECTION, POWDER, FOR SOLUTION INTRAMUSCULAR; INTRAVENOUS at 17:36

## 2018-11-13 RX ADMIN — Medication 10 ML: at 21:31

## 2018-11-13 NOTE — PROGRESS NOTES
[facility]: MED RECONCILIATION Medications added:  
· Spiriva · Albuterol nebulizer · Montelukast 
 
Medications removed: · Metamucil 2 tsp daily · Protonix Medications adjusted: · Bumex- previously 1mg daily · Dymista- previously BID prn · Loperamide- previously 2-4mg as needed Information obtained from: Patient (alert, oriented, good historian), RxQuery, previous medical records Allergies: Advair diskus [fluticasone-salmeterol]; Aspartame; Ciprofloxacin; Statins-hmg-coa reductase inhibitors; Sulfa (sulfonamide antibiotics); Tetracycline; and Zetia [ezetimibe] Prior to Admission Medications:  
 
Prior to Admission Medications Prescriptions Last Dose Informant Patient Reported? Taking? DULoxetine (CYMBALTA) 60 mg capsule 2018 at AM Self Yes Yes Sig: Take 60 mg by mouth daily. HYDROcodone-acetaminophen (NORCO) 5-325 mg per tablet  Self Yes Yes Sig: Take 1 Tab by mouth every four (4) hours as needed for Pain. acetaminophen (TYLENOL) 325 mg tablet  Self Yes Yes Sig: Take 650 mg by mouth every six (6) hours as needed for Pain. albuterol (PROAIR HFA) 90 mcg/actuation inhaler  Self Yes Yes Sig: Take  by inhalation every four (4) hours as needed for Wheezing. albuterol (PROVENTIL VENTOLIN) 2.5 mg /3 mL (0.083 %) nebulizer solution 2018 at AM  Yes Yes Si.5 mg by Nebulization route two (2) times a day. azelastine-fluticasone (DYMISTA) 137-50 mcg/spray spry 2018 at AM Self Yes Yes Si Spray by Nasal route two (2) times a day. biotin 10,000 mcg cap 2018 at AM Self Yes Yes Sig: Take 1 Cap by mouth daily. bumetanide (BUMEX) 0.5 mg tablet  Self Yes Yes Sig: Take 1 mg by mouth daily as needed. carvedilol (COREG) 12.5 mg tablet 2018 at AM  Yes Yes Sig: Take 6.25 mg by mouth two (2) times daily (with meals). cholecalciferol (VITAMIN D3) 1,000 unit tablet 2018 at AM Self Yes Yes Sig: Take 1,000 Units by mouth daily. clopidogrel (PLAVIX) 75 mg tab 11/13/2018 at AM Self Yes Yes Sig: Take 75 mg by mouth daily. isosorbide mononitrate ER (IMDUR) 30 mg tablet 11/13/2018 at AM Self No Yes Sig: TAKE 1 TABLET BY MOUTH ONCE DAILY AS DIRECTED  
lactobacillus rhamnosus gg 10 billion cell (CULTURELLE) 10 billion cell capsule 11/13/2018 at AM Self Yes Yes Sig: Take 1 Cap by mouth daily. lisinopril (PRINIVIL, ZESTRIL) 5 mg tablet 11/13/2018 at AM Self No Yes Sig: TAKE 1 TABLET BY MOUTH DAILY  
loperamide (IMMODIUM) 2 mg tablet 11/13/2018 at AM Self Yes Yes Sig: Take 2 mg by mouth two (2) times a day. mometasone-formoterol (DULERA) 200-5 mcg/actuation HFA inhaler 11/13/2018 at AM Self Yes Yes Sig: Take 2 Puffs by inhalation two (2) times a day. montelukast (SINGULAIR) 10 mg tablet 11/13/2018 at AM  Yes Yes Sig: Take 10 mg by mouth daily. omeprazole (PRILOSEC) 20 mg capsule 11/13/2018 at AM Self Yes Yes Sig: Take 20 mg by mouth two (2) times a day. potassium chloride (K-DUR, KLOR-CON) 20 mEq tablet 11/12/2018 at PM Self Yes Yes Sig: Take 20 mEq by mouth daily (with dinner). predniSONE (DELTASONE) 10 mg tablet 11/13/2018 at Unknown time Self Yes Yes Sig: Take 20 mg by mouth daily. promethazine (PHENERGAN) 25 mg tablet  Self Yes Yes Sig: Take 25 mg by mouth every six (6) hours as needed for Nausea. rivaroxaban (XARELTO) 15 mg tab tablet 11/12/2018 at PM Self Yes Yes Sig: Take 15 mg by mouth daily (with dinner). saxagliptin (ONGLYZA) 5 mg tab tablet 11/13/2018 at AM Self Yes Yes Sig: Take 5 mg by mouth daily. Takes at 1630  
tiotropium [facility] WITH HANDIHALER) 18 mcg inhalation capsule 11/13/2018 at AM  Yes Yes Sig: Take 1 Cap by inhalation daily. Facility-Administered Medications: None Janes Em

## 2018-11-13 NOTE — CONSULTS
Name: Justine Lobo: [address]  
: 1945 Admit Date: 2018 Phone: [phone]  Room: 770 160 [facility] PCP: John Clark MD  MRN: [id number] Date: 2018  Code: Full Code Chart and notes reviewed. Data reviewed. I review the patient's current medications in the medical record at each encounter. I have evaluated and examined the patient. HPI: 
 
6:36 PM    
 
History was obtained from patient. I was asked by Sami Garcia MD to see Page Garcia in consultation for a chief complaint of shortness of breath. History of Present Illness: Ms. Alyson Keene is a 81 you woman with a history of COPD/asthma (dx about 10 years ago; FEV1 1.21L, 51%), seasonal/environmental allergies, PNA, HAYES (with CPAP use), CAD s/p MI, DVT, HTN, DM, temporal arteritis (on chronic steroids, 20mg daily), and former smoker. [facility] is managed on Dulera 200, Dymista, and Ventolin. She takes 20 mg daily prednisone for temporal arteritis and Xarelto for previous DVT. She has been evaluated in our office three times in the past month for progressive shortness of breath. Spiriva has been added to her regimen without improved and been on two PO prednisone tapers. She was diuresed without significant change in her symptoms so she stopped her diuretics about a month ago. She underwent cardiac catheterization 10/19 without significant coronary artery disease, EF 60%, RVSP 32 with a wedge of 9. Labs reviewed: WBC 13.9, Cr 1.75, proBNP 470 Images: CXR without acute process, T7 vertebral body fracture CT chest 10/12 without airspace disease, does have bilateral atelectasis, collapse of superior endplate of T7 Past Medical History:  
Diagnosis Date  Asthma  Autoimmune disease ([address]   
 fibromyalgia  CAD (coronary artery disease) 10/9/2015  Closed wedge compression fracture of T7 vertebra ([facility] 2018  Diabetes ([facility]  DVT (deep vein thrombosis) in pregnancy ([facility]  GERD (gastroesophageal reflux disease)  HTN (hypertension)  NSTEMI (non-ST elevated myocardial infarction) ([facility] [facility] 10/9/2015  Obesity (BMI 30-39.9) 2018  Sleep apnea   
 wears CPAP Past Surgical History:  
Procedure Laterality Date  ABDOMEN SURGERY PROC UNLISTED    
 hital hernia repair, gall bladder removed  BREAST SURGERY PROCEDURE UNLISTED    
 lumpectomy  CARDIAC SURG PROCEDURE UNLIST  10/9/15  
 2 stents [facility]  HX COLOSTOMY    
 and reversal, post episiotomy repair [address]  HX UROLOGICAL  2009  
 bladder sling Family History Problem Relation Age of Onset  Diabetes Mother  Heart Failure Mother  Kidney Disease Mother  Diabetes Father  Heart Disease Father  Elevated Lipids Father  Heart Failure Father  Heart Disease Brother  Cancer Brother   
     kidney  Diabetes Brother  No Known Problems Brother  No Known Problems Brother Social History Tobacco Use  Smoking status: Former Smoker Last attempt to quit: 3/30/2017 Years since quittin.6  Smokeless tobacco: Never Used Substance Use Topics  Alcohol use: No  
  Alcohol/week: 0.0 oz  
  Comment: very very rarely Allergies Allergen Reactions  Advair Diskus [Fluticasone-Salmeterol] Rash  Aspartame Other (comments)  Ciprofloxacin Rash  Statins-Hmg-Coa Reductase Inhibitors Other (comments) Muscle pain Lipitor/crestor/zocor  Sulfa (Sulfonamide Antibiotics) Rash  Tetracycline Other (comments) musclepain  Zetia [Ezetimibe] Myalgia Current Facility-Administered Medications Medication Dose Route Frequency  arformoterol (BROVANA) neb solution 15 mcg  15 mcg Nebulization BID RT  
 budesonide (PULMICORT) 500 mcg/2 ml nebulizer suspension  500 mcg Nebulization BID RT  
  albuterol-ipratropium (DUO-NEB) 2.5 MG-0.5 MG/3 ML  3 mL Nebulization Q6H RT  
 [START ON 11/14/2018] methylPREDNISolone (PF) (SOLU-MEDROL) injection 80 mg  80 mg IntraVENous Q6H  
 carvedilol (COREG) tablet 6.25 mg  6.25 mg Oral BID WITH MEALS  
 [START ON 11/14/2018] clopidogrel (PLAVIX) tablet 75 mg  75 mg Oral DAILY  [START ON 11/14/2018] DULoxetine (CYMBALTA) capsule 60 mg  60 mg Oral DAILY  [START ON 11/14/2018] isosorbide mononitrate ER (IMDUR) tablet 30 mg  30 mg Oral DAILY  [START ON 11/14/2018] lisinopril (PRINIVIL, ZESTRIL) tablet 5 mg  5 mg Oral DAILY  [START ON 11/14/2018] montelukast (SINGULAIR) tablet 10 mg  10 mg Oral DAILY  [START ON 11/14/2018] sAXagliptin (ONGLYZA) tablet 5 mg  5 mg Oral DAILY  0.9% sodium chloride infusion  75 mL/hr IntraVENous CONTINUOUS  
 sodium chloride (NS) flush 5-10 mL  5-10 mL IntraVENous Q8H  
 sodium chloride (NS) flush 5-10 mL  5-10 mL IntraVENous PRN  
 acetaminophen (TYLENOL) tablet 650 mg  650 mg Oral Q4H PRN  
 oxyCODONE-acetaminophen (PERCOCET) 5-325 mg per tablet 1 Tab  1 Tab Oral Q4H PRN  
 HYDROmorphone (PF) (DILAUDID) injection 0.5 mg  0.5 mg IntraVENous Q4H PRN  prochlorperazine (COMPAZINE) injection 10 mg  10 mg IntraVENous Q6H PRN  
 zolpidem (AMBIEN) tablet 5 mg  5 mg Oral QHS PRN  
 insulin lispro (HUMALOG) injection   SubCUTAneous QID WITH MEALS  glucose chewable tablet 16 g  4 Tab Oral PRN  
 dextrose (D50W) injection syrg 12.5-25 g  25-50 mL IntraVENous PRN  
 glucagon (GLUCAGEN) injection 1 mg  1 mg IntraMUSCular PRN Current Outpatient Medications Medication Sig  
 tiotropium (SPIRIVA WITH HANDIHALER) 18 mcg inhalation capsule Take 1 Cap by inhalation daily.  albuterol (PROVENTIL VENTOLIN) 2.5 mg /3 mL (0.083 %) nebulizer solution 2.5 mg by Nebulization route two (2) times a day.  carvedilol (COREG) 12.5 mg tablet Take 6.25 mg by mouth two (2) times daily (with meals).  montelukast (SINGULAIR) 10 mg tablet Take 10 mg by mouth daily.  bumetanide (BUMEX) 0.5 mg tablet Take 1 mg by mouth daily as needed.  isosorbide mononitrate ER (IMDUR) 30 mg tablet TAKE 1 TABLET BY MOUTH ONCE DAILY AS DIRECTED  omeprazole (PRILOSEC) 20 mg capsule Take 20 mg by mouth two (2) times a day.  albuterol (PROAIR HFA) 90 mcg/actuation inhaler Take  by inhalation every four (4) hours as needed for Wheezing.  clopidogrel (PLAVIX) 75 mg tab Take 75 mg by mouth daily.  rivaroxaban (XARELTO) 15 mg tab tablet Take 15 mg by mouth daily (with dinner).  lactobacillus rhamnosus gg 10 billion cell (CULTURELLE) 10 billion cell capsule Take 1 Cap by mouth daily.  promethazine (PHENERGAN) 25 mg tablet Take 25 mg by mouth every six (6) hours as needed for Nausea.  HYDROcodone-acetaminophen (NORCO) 5-325 mg per tablet Take 1 Tab by mouth every four (4) hours as needed for Pain.  acetaminophen (TYLENOL) 325 mg tablet Take 650 mg by mouth every six (6) hours as needed for Pain.  loperamide (IMMODIUM) 2 mg tablet Take 2 mg by mouth two (2) times a day.  potassium chloride (K-DUR, KLOR-CON) 20 mEq tablet Take 20 mEq by mouth daily (with dinner).  lisinopril (PRINIVIL, ZESTRIL) 5 mg tablet TAKE 1 TABLET BY MOUTH DAILY  biotin 10,000 mcg cap Take 1 Cap by mouth daily.  saxagliptin (ONGLYZA) 5 mg tab tablet Take 5 mg by mouth daily. Takes at [employer]  DULoxetine (CYMBALTA) 60 mg capsule Take 60 mg by mouth daily.  cholecalciferol (VITAMIN D3) 1,000 unit tablet Take 1,000 Units by mouth daily.  azelastine-fluticasone (DYMISTA) 137-50 mcg/spray spry 1 Spray by Nasal route two (2) times a day.  mometasone-formoterol (DULERA) 200-5 mcg/actuation HFA inhaler Take 2 Puffs by inhalation two (2) times a day.  predniSONE (DELTASONE) 10 mg tablet Take 20 mg by mouth daily. REVIEW OF SYSTEMS  
12 point ROS negative except as stated in the HPI. Physical Exam: Visit Vitals /73 Pulse (!) 110 Temp 98.1 °F (36.7 °C) Resp 22 Ht 5' 7\" (1.702 m) Wt 110.2 kg (243 lb) SpO2 95% BMI 38.06 kg/m² General:  Alert, cooperative, no distress, appears stated age. Head:  Normocephalic, without obvious abnormality, atraumatic. Eyes:  Conjunctivae/corneas clear. Nose: Nares normal. Septum midline. Mucosa normal.   
Throat: Lips, mucosa, and tongue normal.   
Neck: Supple, symmetrical, trachea midline, no adenopathy. Lungs:   Diminished with increased WOB at rest.  
Chest wall:  No tenderness or deformity. Heart:  Regular rate and rhythm, S1, S2 normal, no murmur, click, rub or gallop. Abdomen:   Soft, non-tender. Bowel sounds normal. No masses,  No organomegaly. Extremities: Extremities normal, atraumatic, no cyanosis or edema. Pulses: 2+ and symmetric all extremities. Skin: Skin color, texture, turgor normal. No rashes or lesions Lymph nodes: Cervical, supraclavicular nodes normal.  
Neurologic: Grossly nonfocal  
 
 
Lab Results Component Value Date/Time Sodium 138 11/13/2018 03:26 PM  
 Potassium 4.6 11/13/2018 03:26 PM  
 Chloride 101 11/13/2018 03:26 PM  
 CO2 28 11/13/2018 03:26 PM  
 BUN 36 (H) 11/13/2018 03:26 PM  
 Creatinine 1.75 (H) 11/13/2018 03:26 PM  
 Glucose 350 (H) 11/13/2018 03:26 PM  
 Calcium 8.5 11/13/2018 03:26 PM  
 Magnesium 2.0 11/13/2018 03:26 PM  
 Lactic acid 1.4 06/28/2018 04:22 AM  
 
 
Lab Results Component Value Date/Time WBC 13.9 (H) 11/13/2018 03:26 PM  
 HGB 13.0 11/13/2018 03:26 PM  
 PLATELET 222 47/51/1032 03:26 PM  
 MCV 94.7 11/13/2018 03:26 PM  
 
 
Lab Results Component Value Date/Time INR 1.1 10/17/2018 12:53 PM  
 AST (SGOT) 24 11/13/2018 03:26 PM  
 Alk. phosphatase 167 (H) 11/13/2018 03:26 PM  
 Protein, total 6.7 11/13/2018 03:26 PM  
 Albumin 3.1 (L) 11/13/2018 03:26 PM  
 Globulin 3.6 11/13/2018 03:26 PM  
 
 
No results found for: IRON, FE, TIBC, IBCT, PSAT, FERR Lab Results Component Value Date/Time TSH 0.58 11/13/2018 03:26 PM  
  
 
No results found for: PH, PHI, PCO2, PCO2I, PO2, PO2I, HCO3, HCO3I, FIO2, FIO2I Lab Results Component Value Date/Time CK 25 (L) 11/13/2018 03:26 PM  
 CK-MB Index 4.4 (H) 11/13/2018 03:26 PM  
 Troponin-I, Qt. <0.05 11/13/2018 03:26 PM  
  
 
Lab Results Component Value Date/Time Culture result: ESCHERICHIA COLI (PREDOMINATING) (A) 06/28/2018 07:01 AM  
 Culture result: ENTEROBACTER AEROGENES (A) 06/28/2018 07:01 AM  
 Culture result: (A) 06/28/2018 04:13 AM  
  ENTEROBACTER AEROGENES GROWING IN 1 OF 4 BOTTLES DRAWN (SITE=L UPPER ARM) Culture result: (A) 06/28/2018 04:13 AM  
  PRELIMINARY REPORT OF 
GRAM NEGATIVE COCCOBACILLI 
GROWING IN 1 OF 4 BOTTLES DRAWN 
CALLED TO AND READ BACK BY 
CHANG HUNTLEY RN [facility] AT 9873 ON 6/28/2018. [address] Culture result: REMAINING BOTTLE(S) HAS/HAVE NO GROWTH IN 5 DAYS 06/28/2018 04:13 AM  
 
 
No results found for: TOXA1, RPR, HBCM, HBSAG, HAAB, HCAB1, HAAT, G6PD, CRYAC, HIVGT, HIVR, HIV1, HIV12, HIVPC, HIVRPI Lab Results Component Value Date/Time CK 25 (L) 11/13/2018 03:26 PM  
 
 
Lab Results Component Value Date/Time Color YELLOW/STRAW 06/28/2018 07:01 AM  
 Appearance CLOUDY (A) 06/28/2018 07:01 AM  
 Specific gravity 1.020 04/16/2017 01:13 PM  
 pH (UA) 5.5 06/28/2018 07:01 AM  
 Protein 30 (A) 06/28/2018 07:01 AM  
 Glucose NEGATIVE  06/28/2018 07:01 AM  
 Ketone NEGATIVE  06/28/2018 07:01 AM  
 Bilirubin NEGATIVE  06/28/2018 07:01 AM  
 Blood MODERATE (A) 06/28/2018 07:01 AM  
 Urobilinogen 0.2 06/28/2018 07:01 AM  
 Nitrites POSITIVE (A) 06/28/2018 07:01 AM  
 Leukocyte Esterase MODERATE (A) 06/28/2018 07:01 AM  
 WBC  06/28/2018 07:01 AM  
 RBC 0-5 06/28/2018 07:01 AM  
 Bacteria 3+ (A) 06/28/2018 07:01 AM  
 
 
IMPRESSION 
· COPD exacerbation · Shortness of breath · Pulmonary hypertension, likely secondary to underlying obstructive lung disease and HAYES, but does have a history of CTD · History of DVT, on xarelto PLAN 
· Goal sats >90% · Duonebs; brovana/pulmicort · Singulair · IV steroid trial 
· VQ ordered · RVP · No indication for antibiotics at this time · Will need to consider treatment of PH if symptoms persist 
· PPI 
· On xarelto chronically Thank you for allowing us to participate in the care of this patient. We will be happy to follow along in his/her progress with you.  
 
Coby Avery MD

## 2018-11-13 NOTE — ED PROVIDER NOTES
81 y.o. female with past medical history significant for hypertension, diabetes, DVT, CAD, NSTEMI, fibromyalgia, asthma, GERD, and sleep apnea who presents from home with chief complaint of SOB. Pt states that she has been experiencing SOB and difficulty breathing for the past 3 months and has been f/u with Cardiology and Pulmonology once a month with unknown SOB etiology. Pt also reports 3 months of central back pain radiating through to her chest. She woke up this morning with a headache and lightheadedness, and has been experiencing recent fatigue as if she is going to fall when she is up and walking. Pt reports some left sided abdominal pain which she attributes to \"not eating today because it wouldn't go down\" due to difficulty swallowing. She is also experiencing chest \"tightness\" behind her left breast. She went to her Pulnologist today who sent her to the ED for further evaluation. Pt states that she had O2 sats of 92% today at her Pulmonologist's office. Pt notes that she had a recent catheterization to check her stent which was placed 6 months ago. She also reports a h/o stent placement 3 years ago. She states that she takes Imodium BID for chronic diarrhea. Pt takes Tramadol during the day and Hydrocodone at night. She is also taking Prednisone 60 mg daily. Pt denies leg swelling, syncope, weakness, dizziness, fever, chills, N/V/D, constipation, cough, congestion, dysuria, difficulty urinating, hematuria. There are no other acute medical concerns at this time. Social hx: former tobacco smoker (quit 1 year ago); no EtOH use PCP: Jamir Loredo MD 
 
Note written by Sandra Pulliam, as dictated by Cara Rogers MD 4:33 PM 
 
 
The history is provided by the patient and a relative (daughter). No  was used. 3:00 PM 
I have evaluated the patient as the Provider in Triage. I have reviewed Her vital signs and the triage nurse assessment.  I have talked with the patient and any available family and advised that I am the provider in triage and have ordered the appropriate study to initiate their work up based on the clinical presentation during my assessment. I have advised that the patient will be accommodated in the Main ED as soon as possible. I have also requested to contact the triage nurse or myself immediately if the patient experiences any changes in their condition during this brief waiting period. C/o worsening sob over past few months. Sent by pulmonary today. Is on steroids at this time. Jon Swan MD 
 
 
Past Medical History:  
Diagnosis Date  Asthma  Autoimmune disease ([facility] [address]   
 fibromyalgia  CAD (coronary artery disease) 10/9/2015  [facility]  DVT (deep vein thrombosis) in pregnancy ([facility]  GERD (gastroesophageal reflux disease)  HTN (hypertension)  NSTEMI (non-ST elevated myocardial infarction) ([facility] 10/9/2015  Sleep apnea   
 wears CPAP Past Surgical History:  
Procedure Laterality Date  ABDOMEN SURGERY PROC UNLISTED    
 hital hernia repair, gall bladder removed  BREAST SURGERY PROCEDURE UNLISTED    
 lumpectomy  CARDIAC SURG PROCEDURE UNLIST  10/9/15  
 2 stents [facility]  HX COLOSTOMY    
 and reversal, post episiotomy repair [address]  HX UROLOGICAL  2009  
 bladder sling Family History:  
Problem Relation Age of Onset  Diabetes Mother  Heart Failure Mother  Kidney Disease Mother  Diabetes Father  Heart Disease Father  Elevated Lipids Father  Heart Failure Father  Heart Disease Brother  Cancer Brother   
     kidney  Diabetes Brother  No Known Problems Brother  No Known Problems Brother Social History Socioeconomic History  Marital status:  Spouse name: Not on file  Number of children: Not on file  Years of education: Not on file  Highest education level: Not on file Social Needs  Financial resource strain: Not on file  Food insecurity - worry: Not on file  Food insecurity - inability: Not on file  Transportation needs - medical: Not on file  Transportation needs - non-medical: Not on file Occupational History  Not on file Tobacco Use  Smoking status: Former Smoker Last attempt to quit: 3/30/2017 Years since quittin.6  Smokeless tobacco: Never Used Substance and Sexual Activity  Alcohol use: No  
  Alcohol/week: 0.0 oz  
  Comment: very very rarely  Drug use: No  
 Sexual activity: No  
Other Topics Concern  Not on file Social History Narrative  Not on file ALLERGIES: Advair diskus [fluticasone-salmeterol]; Aspartame; Ciprofloxacin; Statins-hmg-coa reductase inhibitors; Sulfa (sulfonamide antibiotics); Tetracycline; and Zetia [ezetimibe] Review of Systems Constitutional: Positive for activity change, appetite change and fatigue. Negative for chills and fever. HENT: Positive for rhinorrhea (chronic) and trouble swallowing. Negative for congestion. Respiratory: Positive for chest tightness and shortness of breath. Negative for cough. Cardiovascular: Positive for chest pain. Gastrointestinal: Positive for abdominal pain (LLQ). Negative for constipation, diarrhea (On lomotil), nausea and vomiting. Endocrine: Positive for polydipsia, polyphagia and polyuria. Genitourinary: Positive for frequency and urgency. Negative for decreased urine volume, difficulty urinating, dysuria and hematuria. Musculoskeletal: Positive for back pain and myalgias. Skin: Positive for color change. Neurological: Positive for dizziness and headaches. Psychiatric/Behavioral: Negative. Vitals:  
 18 1500 18 1600 18 1608 18 1656 BP: 121/66 120/73 Pulse: (!) 111  (!) 109 (!) 105 Resp: 22 Temp: 98.1 °F (36.7 °C) SpO2: 95% 98% 97% 96% Weight: 110.2 kg (243 lb) Height: 5' 7\" (1.702 m) Physical Exam  
Constitutional: She is oriented to person, place, and time. She appears well-developed and well-nourished. HENT:  
Head: Normocephalic and atraumatic. Neck: Normal range of motion. Neck supple. Cardiovascular: Normal rate, regular rhythm, normal heart sounds and intact distal pulses. Pulmonary/Chest: Effort normal and breath sounds normal. No respiratory distress. She has no decreased breath sounds. She has no wheezes. She has no rhonchi. She has no rales. She exhibits no tenderness. Visual observation of shortness of breath. Abdominal: Soft. Bowel sounds are normal. There is no tenderness. There is no guarding. Musculoskeletal: Normal range of motion. Neurological: She is alert and oriented to person, place, and time. Skin: Skin is warm and dry. No erythema. Psychiatric: She has a normal mood and affect. Her behavior is normal. Judgment and thought content normal.  
Nursing note and vitals reviewed. [facility] Procedures Assessment & Plan:  
 
Orders Placed This Encounter  XR CHEST PA LAT  CBC WITH AUTOMATED DIFF  
 CK W/ CKMB & INDEX  METABOLIC PANEL, COMPREHENSIVE  
 MAGNESIUM  
 NT-PRO BNP  TROPONIN I  
 OXYGEN CANNULA  EKG, 12 LEAD, INITIAL  
 
T-spine back pain is likely related to T7 superior endplate Fx seen on CT Chest in October. TTP over site. Patient was unaware of finding. Discussed with Nadia Hilliard MD,ED Provider Jeannie Lobo NP 
11/13/18 
4:14 PM 
 
ED EKG interpretation: 
Rhythm: sinus tachycardia; and regular . Rate (approx.): 109; right atrial enlargement; pulmonary disease pattern; left anterior fascicular block. Note written by Sandra Mcbride, as dictated by Nadia Hilliard MD 4:48 PM 
 
CONSULT NOTE: 
5:19 PM Shannon Bullock NP spoke with Dr. Chas Mullins, Consult for Pulmonology. Discussed available diagnostic tests and clinical findings. Dr. Miriam Lopez recommends admission to hospitalist, VQ scan, IV steroids, and breathing treatments. Spoke with Dr. Rashard Newell for admission per Dr. Tolu Narayan request.  
 
Leonid Mclain NP 
11/13/18 
5:24 PM 
 
5:24 PM 
Patient is being admitted to the hospital.  The results of their tests and reasons for their admission have been discussed with them and/or available family. They convey agreement and understanding for the need to be admitted and for their admission diagnosis. Consultation has been made with the inpatient physician specialist for hospitalization. LABORATORY TESTS: 
Recent Results (from the past 12 hour(s)) CBC WITH AUTOMATED DIFF Collection Time: 11/13/18  3:26 PM  
Result Value Ref Range WBC 13.9 (H) 3.6 - 11.0 K/uL  
 RBC 4.49 3.80 - 5.20 M/uL  
 HGB 13.0 11.5 - 16.0 g/dL HCT 42.5 35.0 - 47.0 % MCV 94.7 80.0 - 99.0 FL  
 MCH 29.0 26.0 - 34.0 PG  
 MCHC 30.6 30.0 - 36.5 g/dL  
 RDW 15.7 (H) 11.5 - 14.5 % PLATELET 239 550 - 519 K/uL MPV 9.9 8.9 - 12.9 FL  
 NRBC 0.0 0  WBC ABSOLUTE NRBC 0.00 0.00 - 0.01 K/uL NEUTROPHILS 84 (H) 32 - 75 % BAND NEUTROPHILS 4 0 - 6 % LYMPHOCYTES 8 (L) 12 - 49 % MONOCYTES 4 (L) 5 - 13 % EOSINOPHILS 0 0 - 7 % BASOPHILS 0 0 - 1 % IMMATURE GRANULOCYTES 0 %  
 ABS. NEUTROPHILS 12.2 (H) 1.8 - 8.0 K/UL  
 ABS. LYMPHOCYTES 1.1 0.8 - 3.5 K/UL  
 ABS. MONOCYTES 0.6 0.0 - 1.0 K/UL  
 ABS. EOSINOPHILS 0.0 0.0 - 0.4 K/UL  
 ABS. BASOPHILS 0.0 0.0 - 0.1 K/UL  
 ABS. IMM. GRANS. 0.0 K/UL  
 DF MANUAL    
 RBC COMMENTS NORMOCYTIC, NORMOCHROMIC    
CK W/ CKMB & INDEX Collection Time: 11/13/18  3:26 PM  
Result Value Ref Range CK 25 (L) 26 - 192 U/L  
 CK - MB 1.1 <3.6 NG/ML  
 CK-MB Index 4.4 (H) 0 - 2.5 METABOLIC PANEL, COMPREHENSIVE Collection Time: 11/13/18  3:26 PM  
Result Value Ref Range Sodium 138 136 - 145 mmol/L  Potassium 4.6 3.5 - 5.1 mmol/L  
 Chloride 101 97 - 108 mmol/L  
 CO2 28 21 - 32 mmol/L Anion gap 9 5 - 15 mmol/L Glucose 350 (H) 65 - 100 mg/dL BUN 36 (H) 6 - 20 MG/DL Creatinine 1.75 (H) 0.55 - 1.02 MG/DL  
 BUN/Creatinine ratio 21 (H) 12 - 20 GFR est AA 35 (L) >60 ml/min/1.73m2 GFR est non-AA 29 (L) >60 ml/min/1.73m2 Calcium 8.5 8.5 - 10.1 MG/DL Bilirubin, total 0.6 0.2 - 1.0 MG/DL  
 ALT (SGPT) 72 12 - 78 U/L  
 AST (SGOT) 24 15 - 37 U/L Alk. phosphatase 167 (H) 45 - 117 U/L Protein, total 6.7 6.4 - 8.2 g/dL Albumin 3.1 (L) 3.5 - 5.0 g/dL Globulin 3.6 2.0 - 4.0 g/dL A-G Ratio 0.9 (L) 1.1 - 2.2 MAGNESIUM Collection Time: 11/13/18  3:26 PM  
Result Value Ref Range Magnesium 2.0 1.6 - 2.4 mg/dL NT-PRO BNP Collection Time: 11/13/18  3:26 PM  
Result Value Ref Range NT pro- (H) 0 - 125 PG/ML  
TROPONIN I Collection Time: 11/13/18  3:26 PM  
Result Value Ref Range Troponin-I, Qt. <0.05 <0.05 ng/mL TSH 3RD GENERATION Collection Time: 11/13/18  3:26 PM  
Result Value Ref Range TSH 0.58 0.36 - 3.74 uIU/mL EKG, 12 LEAD, INITIAL Collection Time: 11/13/18  4:09 PM  
Result Value Ref Range Ventricular Rate 109 BPM  
 Atrial Rate 109 BPM  
 P-R Interval 128 ms QRS Duration 76 ms  
 Q-T Interval 344 ms QTC Calculation (Bezet) 463 ms Calculated P Axis 64 degrees Calculated R Axis -66 degrees Calculated T Axis 50 degrees Diagnosis Sinus tachycardia Right atrial enlargement Pulmonary disease pattern Left anterior fascicular block Abnormal ECG When compared with ECG of 27-JUN-2018 10:35, No significant change was found IMAGING RESULTS: 
XR CHEST PA LAT Final Result NM LUNG VENT/PERF    (Results Pending) Xr Chest Pa Lat Result Date: 11/13/2018 EXAM:  XR CHEST PA LAT INDICATION:  Chest pain. Shortness of breath for several months COMPARISON: CT chest 10/12/2018.  Chest radiograph 6/27/2018 TECHNIQUE: PA and lateral chest views FINDINGS: The cardiomediastinal contours are stable. The pulmonary vasculature is within normal limits. The lungs and pleural spaces are clear. There is no pneumothorax. There has been progressive severe compression fracture of the T7 vertebral body since 10/12/2018. The upper abdomen is unremarkable. IMPRESSION: 1. No acute cardiopulmonary process. 2. Progressive severe compression fracture of the T7 vertebral body since 10/12/2018. MEDICATIONS GIVEN: 
Medications  
methylPREDNISolone (PF) (SOLU-MEDROL) injection 125 mg (not administered)  
traMADol (ULTRAM) tablet 50 mg (not administered) IMPRESSION: 
1. SOB (shortness of breath) PLAN: 
1.  Admit to hospitalist 
 
 
Jess , NP

## 2018-11-13 NOTE — PROGRESS NOTES
Gave checkout over the phone at 6:37 PM to Dr. Ihsan Lanza who has accepted care of this patient. Lazaro Tang MD 
11/13/2018

## 2018-11-13 NOTE — PROGRESS NOTES
[facility]: MED RECONCILIATION Medications added:  
· Spiriva · Albuterol nebulizer · Montelukast 
 
Medications removed: · Metamucil 2 tsp daily · Protonix Medications adjusted: · Bumex- previously 1mg daily · Dymista- previously BID prn · Loperamide- previously 2-4mg as needed Information obtained from: Patient (alert, oriented, good historian), RxQuery, previous medical records Allergies: Advair diskus [fluticasone-salmeterol]; Aspartame; Ciprofloxacin; Statins-hmg-coa reductase inhibitors; Sulfa (sulfonamide antibiotics); Tetracycline; and Zetia [ezetimibe] Prior to Admission Medications:  
 
Prior to Admission Medications Prescriptions Last Dose Informant Patient Reported? Taking? DULoxetine (CYMBALTA) 60 mg capsule 2018 at AM Self Yes Yes Sig: Take 60 mg by mouth daily. HYDROcodone-acetaminophen (NORCO) 5-325 mg per tablet  Self Yes Yes Sig: Take 1 Tab by mouth every four (4) hours as needed for Pain. acetaminophen (TYLENOL) 325 mg tablet  Self Yes Yes Sig: Take 650 mg by mouth every six (6) hours as needed for Pain. albuterol (PROAIR HFA) 90 mcg/actuation inhaler  Self Yes Yes Sig: Take  by inhalation every four (4) hours as needed for Wheezing. albuterol (PROVENTIL VENTOLIN) 2.5 mg /3 mL (0.083 %) nebulizer solution 2018 at AM  Yes Yes Si.5 mg by Nebulization route two (2) times a day. azelastine-fluticasone (DYMISTA) 137-50 mcg/spray spry 2018 at AM Self Yes Yes Si Spray by Nasal route two (2) times a day. biotin 10,000 mcg cap 2018 at AM Self Yes Yes Sig: Take 1 Cap by mouth daily. bumetanide (BUMEX) 0.5 mg tablet  Self Yes Yes Sig: Take 1 mg by mouth daily as needed. carvedilol (COREG) 12.5 mg tablet 2018 at AM  Yes Yes Sig: Take 6.25 mg by mouth two (2) times daily (with meals). cholecalciferol (VITAMIN D3) 1,000 unit tablet 2018 at AM Self Yes Yes Sig: Take 1,000 Units by mouth daily. clopidogrel (PLAVIX) 75 mg tab 11/13/2018 at AM Self Yes Yes Sig: Take 75 mg by mouth daily. isosorbide mononitrate ER (IMDUR) 30 mg tablet 11/13/2018 at AM Self No Yes Sig: TAKE 1 TABLET BY MOUTH ONCE DAILY AS DIRECTED  
lactobacillus rhamnosus gg 10 billion cell (CULTURELLE) 10 billion cell capsule 11/13/2018 at AM Self Yes Yes Sig: Take 1 Cap by mouth daily. lisinopril (PRINIVIL, ZESTRIL) 5 mg tablet 11/13/2018 at AM Self No Yes Sig: TAKE 1 TABLET BY MOUTH DAILY  
loperamide (IMMODIUM) 2 mg tablet 11/13/2018 at AM Self Yes Yes Sig: Take 2 mg by mouth two (2) times a day. mometasone-formoterol (DULERA) 200-5 mcg/actuation HFA inhaler 11/13/2018 at AM Self Yes Yes Sig: Take 2 Puffs by inhalation two (2) times a day. montelukast (SINGULAIR) 10 mg tablet 11/13/2018 at AM  Yes Yes Sig: Take 10 mg by mouth daily. omeprazole (PRILOSEC) 20 mg capsule 11/13/2018 at AM Self Yes Yes Sig: Take 20 mg by mouth two (2) times a day. potassium chloride (K-DUR, KLOR-CON) 20 mEq tablet 11/12/2018 at PM Self Yes Yes Sig: Take 20 mEq by mouth daily (with dinner). predniSONE (DELTASONE) 10 mg tablet 11/13/2018 at Unknown time Self Yes Yes Sig: Take 20 mg by mouth daily. promethazine (PHENERGAN) 25 mg tablet  Self Yes Yes Sig: Take 25 mg by mouth every six (6) hours as needed for Nausea. rivaroxaban (XARELTO) 15 mg tab tablet 11/12/2018 at PM Self Yes Yes Sig: Take 15 mg by mouth daily (with dinner). saxagliptin (ONGLYZA) 5 mg tab tablet 11/13/2018 at AM Self Yes Yes Sig: Take 5 mg by mouth daily. Takes at 1630  
tiotropium [facility] WITH HANDIHALER) 18 mcg inhalation capsule 11/13/2018 at AM  Yes Yes Sig: Take 1 Cap by inhalation daily. Facility-Administered Medications: None Janes Bueno

## 2018-11-13 NOTE — ED TRIAGE NOTES
Patient reports ongoing shortness of breath for the past several months, has been followed by cardiology and pulmonology, who sent her here today for increased SOB. Sats ok in triage.

## 2018-11-13 NOTE — H&P
[address] [facility] [address] [facility], [facility] 19 
[phone] Admission History and Physical 
 
 
NAME:  Marquez Dennison :   1945 MRN:  [id number] PCP:  Christiana Germain MD  
 
Date/Time:  2018 Subjective: CHIEF COMPLAINT: SOB HISTORY OF PRESENT ILLNESS:    
Ms. Olivia Perez is a 81 y.o.  female who is admitted with SOB. Ms. Olivia Perez presented to the Emergency Department this PM complaining of SOB: for the past 3 months, worked up by Pulmonary and Cardiology as an outpatient without any definite diagnosis, not hypoxic, associated with back pain, unable to take a deep breath without pain, back pain started at the same time as the SOB. History obtained from chart review and the patient. Previous records reviewed, recent admit for chest pain Past Medical History:  
Diagnosis Date  Asthma  Autoimmune disease ([address]   
 fibromyalgia  CAD (coronary artery disease) 10/9/2015  Closed wedge compression fracture of T7 vertebra ([address] 2018  Diabetes ([facility]  DVT (deep vein thrombosis) in pregnancy ([address]  GERD (gastroesophageal reflux disease)  HTN (hypertension)  NSTEMI (non-ST elevated myocardial infarction) ([address] 10/9/2015  Obesity (BMI 30-39.9) 2018  Sleep apnea   
 wears CPAP Past Surgical History:  
Procedure Laterality Date  ABDOMEN SURGERY PROC UNLISTED    
 hital hernia repair, gall bladder removed  BREAST SURGERY PROCEDURE UNLISTED    
 lumpectomy  CARDIAC SURG PROCEDURE UNLIST  10/9/15  
 2 stents [place]  HX COLOSTOMY    
 and reversal, post episiotomy repair [address] [place]  HX UROLOGICAL  2009  
 bladder sling Social History Tobacco Use  Smoking status: Former Smoker Last attempt to quit: 3/30/2017 Years since quittin.6  Smokeless tobacco: Never Used Substance Use Topics  Alcohol use: No  
  Alcohol/week: 0.0 oz  
  Comment: very very rarely Family History Problem Relation Age of Onset  Diabetes Mother  Heart Failure Mother  Kidney Disease Mother  Diabetes Father  Heart Disease Father  Elevated Lipids Father  Heart Failure Father  Heart Disease Brother  Cancer Brother   
     kidney  Diabetes Brother  No Known Problems Brother  No Known Problems Brother Allergies Allergen Reactions  Advair Diskus [Fluticasone-Salmeterol] Rash  Aspartame Other (comments)  Ciprofloxacin Rash  Statins-Hmg-Coa Reductase Inhibitors Other (comments) Muscle pain Lipitor/crestor/zocor  Sulfa (Sulfonamide Antibiotics) Rash  Tetracycline Other (comments) musclepain  Zetia [Ezetimibe] Myalgia Prior to Admission medications Medication Sig Start Date End Date Taking? Authorizing Provider  
tiotropium (SPIRIVA WITH HANDIHALER) 18 mcg inhalation capsule Take 1 Cap by inhalation daily. Yes Provider, Historical  
albuterol (PROVENTIL VENTOLIN) 2.5 mg /3 mL (0.083 %) nebulizer solution 2.5 mg by Nebulization route two (2) times a day. Yes Provider, Historical  
carvedilol (COREG) 12.5 mg tablet Take 6.25 mg by mouth two (2) times daily (with meals). Yes Provider, Historical  
montelukast (SINGULAIR) 10 mg tablet Take 10 mg by mouth daily. Yes Provider, Historical  
bumetanide (BUMEX) 0.5 mg tablet Take 1 mg by mouth daily as needed. Yes Provider, Historical  
isosorbide mononitrate ER (IMDUR) 30 mg tablet TAKE 1 TABLET BY MOUTH ONCE DAILY AS DIRECTED 10/19/18  Yes Amarilys Vickers, NP  
omeprazole (PRILOSEC) 20 mg capsule Take 20 mg by mouth two (2) times a day. Yes Provider, Historical  
albuterol (PROAIR HFA) 90 mcg/actuation inhaler Take  by inhalation every four (4) hours as needed for Wheezing. Yes Provider, Historical  
clopidogrel (PLAVIX) 75 mg tab Take 75 mg by mouth daily.    Yes Provider, Historical  
rivaroxaban (XARELTO) 15 mg tab tablet Take 15 mg by mouth daily (with dinner). Yes Provider, Historical  
lactobacillus rhamnosus gg 10 billion cell (CULTURELLE) 10 billion cell capsule Take 1 Cap by mouth daily. Yes Provider, Historical  
promethazine (PHENERGAN) 25 mg tablet Take 25 mg by mouth every six (6) hours as needed for Nausea. Yes Provider, Historical  
HYDROcodone-acetaminophen (NORCO) 5-325 mg per tablet Take 1 Tab by mouth every four (4) hours as needed for Pain. Yes Provider, Historical  
acetaminophen (TYLENOL) 325 mg tablet Take 650 mg by mouth every six (6) hours as needed for Pain. Yes Provider, Historical  
loperamide (IMMODIUM) 2 mg tablet Take 2 mg by mouth two (2) times a day. Yes Provider, Historical  
potassium chloride (K-DUR, KLOR-CON) 20 mEq tablet Take 20 mEq by mouth daily (with dinner). Yes Provider, Historical  
lisinopril (PRINIVIL, ZESTRIL) 5 mg tablet TAKE 1 TABLET BY MOUTH DAILY 3/16/18  Yes Tammy Foster MD  
biotin 10,000 mcg cap Take 1 Cap by mouth daily. Yes Provider, Historical  
saxagliptin (ONGLYZA) 5 mg tab tablet Take 5 mg by mouth daily. Takes at 36   Yes Provider, Historical  
DULoxetine (CYMBALTA) 60 mg capsule Take 60 mg by mouth daily. Yes Provider, Historical  
cholecalciferol (VITAMIN D3) 1,000 unit tablet Take 1,000 Units by mouth daily. Yes Provider, Historical  
azelastine-fluticasone (DYMISTA) 137-50 mcg/spray spry 1 Spray by Nasal route two (2) times a day. Yes Provider, Historical  
mometasone-formoterol (DULERA) 200-5 mcg/actuation HFA inhaler Take 2 Puffs by inhalation two (2) times a day. Yes Provider, Historical  
predniSONE (DELTASONE) 10 mg tablet Take 20 mg by mouth daily. Yes Provider, Historical  
 
 
 
Review of Systems: 
(bold if positive, if negative) Gen:  Weight gainfatigueEyes:  ENT:  rhinorrheaCVS:  chest paindizzinessPulm:  GI:  ,diarrhea :   
 MS:  Pain, weakness,Skin:  Psych:  Endo:  weight gain, polyuria Hem:  Renal:   
Neuro:  headache Objective: VITALS:   
Vital signs reviewed; most recent are: 
 
Visit Vitals /73 Pulse (!) 110 Temp 98.1 °F (36.7 °C) Resp 22 Ht 5' 7\" (1.702 m) Wt 110.2 kg (243 lb) SpO2 95% BMI 38.06 kg/m² SpO2 Readings from Last 6 Encounters:  
11/13/18 95% 10/19/18 98% 10/17/18 99% 10/01/18 97% 08/31/18 98% 07/01/18 94% O2 Flow Rate (L/min): 3 l/min No intake or output data in the 24 hours ending 11/13/18 1805 Exam:  
 
Physical Exam: 
 
Gen: Well-developed, obese, in no acute distress HEENT:  Pink conjunctivae, PERRL, hearing intact to voice, moist mucous membranes Neck: Supple, without masses, thyroid non-tender Resp: No accessory muscle use, clear but shallow breath sounds without wheezes rales or rhonchi 
Card: No murmurs, normal S1, S2 without thrills, bruits or peripheral edema Abd:  Soft, non-tender, non-distended, normoactive bowel sounds are present, no palpable organomegaly and no detectable hernias Lymph:  No cervical or inguinal adenopathy Musc: No cyanosis or clubbing Skin: No rashes or ulcers, skin turgor is good Neuro:  Cranial nerves are grossly intact, no focal motor weakness, follows commands appropriately Psych:  Good insight, oriented to person, place and time, alert Labs: 
 
Recent Labs 11/13/18 
1526 WBC 13.9* HGB 13.0 HCT 42.5  Recent Labs 11/13/18 
1526   
K 4.6  CO2 28 * BUN 36* CREA 1.75* CA 8.5 MG 2.0 ALB 3.1* TBILI 0.6 SGOT 24 ALT 72 Lab Results Component Value Date/Time Glucose (POC) 106 (H) 10/19/2018 11:37 AM  
 Glucose (POC) 217 (H) 10/19/2018 07:16 AM  
 
No results for input(s): PH, PCO2, PO2, HCO3, FIO2 in the last 72 hours. No results for input(s): INR in the last 72 hours.  
 
No lab exists for component: INREXT, INREXT 
 
 Additional testing:  Chest X-ray: no acute process, T7 compression fracture, new c/w June 2018 film, visualized by me. Results not reviewed with Radiologist. 
 
  
Assessment/Plan:   
  
Principal Problem: 
  Dyspnea (11/13/2018) - I suspect this is due, at least in part, to her compression fracture, the time course would certainly seem to indicate so 
 - if so, steroids definitely won't help matters - Pulmonary following 
 - consult Orthopedics as well Active Problems: 
  Type II diabetes mellitus ([address] (10/9/2015) - with reported nephropathy with proteinuria without CKD 
 - continue saxagliptin and follow on SSI Coronary artery disease involving native coronary artery without angina pectoris (1/22/2016) - stable, continue cardiac meds Essential hypertension (1/22/2016) - BP okay, follow on home meds Chronic anticoagulation (3/30/2018) - hold Xarelto for possible kyphoplasty Obesity, BMI 30-39. 9([facility]) (10/1/2018) - counseled on weight loss, this is contributing to her current symptoms, in my opinion ARF (acute renal failure) ([facility] (11/13/2018) - creatinine up acutely from prior, possibly due to diuresis 
 - hold diuretic and give IV fluids and recheck in AM 
 
  Closed wedge compression fracture of T7 vertebra ([address] (11/13/2018) - as above, I wonder if this is the source of her dyspnea 
 - consulting Orthopedics, hopefully she will be a candidate for kyphoplasty Code status: 
 - patient is FULL CODE, no AMD on file, her daughter [address] is her surrogate Total time spent on patient care: 70 Minutes Care Plan discussed with: Patient, Nursing Staff and Artice Nageotte, NP and Dr. Cleopatra Ramirez Discussed:  Code Status, Care Plan and D/C Planning Prophylaxis:  SCD's and Xarelto Probable Disposition:   PT, OT, RN 
        
___________________________________________________ Attending Physician: Anamika Clarke MD

## 2018-11-13 NOTE — ED NOTES
Bedside and Verbal shift change report given to Edgar Johnson (oncoming nurse) by Stef Garcias (offgoing nurse). Report included the following information Kardex.

## 2018-11-13 NOTE — ED NOTES
Pt having increased SOB with minimal movement, remains on 3 L NC. Natalie Lowe Changed into gown and transported to [facility]. Pt aware being admitted.

## 2018-11-14 ENCOUNTER — APPOINTMENT (OUTPATIENT)
Dept: MRI IMAGING | Age: 73
DRG: 516 | End: 2018-11-14
Attending: PHYSICIAN ASSISTANT
Payer: MEDICARE

## 2018-11-14 LAB
ANION GAP SERPL CALC-SCNC: 7 MMOL/L (ref 5–15)
ATRIAL RATE: 109 BPM
BUN SERPL-MCNC: 27 MG/DL (ref 6–20)
BUN/CREAT SERPL: 31 (ref 12–20)
CALCIUM SERPL-MCNC: 7.4 MG/DL (ref 8.5–10.1)
CALCULATED P AXIS, ECG09: 64 DEGREES
CALCULATED R AXIS, ECG10: -66 DEGREES
CALCULATED T AXIS, ECG11: 50 DEGREES
CHLORIDE SERPL-SCNC: 106 MMOL/L (ref 97–108)
CO2 SERPL-SCNC: 26 MMOL/L (ref 21–32)
CREAT SERPL-MCNC: 0.86 MG/DL (ref 0.55–1.02)
DIAGNOSIS, 93000: NORMAL
EST. AVERAGE GLUCOSE BLD GHB EST-MCNC: 197 MG/DL
GLUCOSE BLD STRIP.AUTO-MCNC: 278 MG/DL (ref 65–100)
GLUCOSE BLD STRIP.AUTO-MCNC: 278 MG/DL (ref 65–100)
GLUCOSE BLD STRIP.AUTO-MCNC: 330 MG/DL (ref 65–100)
GLUCOSE BLD STRIP.AUTO-MCNC: 359 MG/DL (ref 65–100)
GLUCOSE SERPL-MCNC: 320 MG/DL (ref 65–100)
HBA1C MFR BLD: 8.5 % (ref 4.2–6.3)
P-R INTERVAL, ECG05: 128 MS
POTASSIUM SERPL-SCNC: 4.1 MMOL/L (ref 3.5–5.1)
Q-T INTERVAL, ECG07: 344 MS
QRS DURATION, ECG06: 76 MS
QTC CALCULATION (BEZET), ECG08: 463 MS
SERVICE CMNT-IMP: ABNORMAL
SODIUM SERPL-SCNC: 139 MMOL/L (ref 136–145)
VENTRICULAR RATE, ECG03: 109 BPM

## 2018-11-14 PROCEDURE — 99218 HC RM OBSERVATION: CPT

## 2018-11-14 PROCEDURE — 74011250637 HC RX REV CODE- 250/637: Performed by: INTERNAL MEDICINE

## 2018-11-14 PROCEDURE — 74011250636 HC RX REV CODE- 250/636: Performed by: PHYSICIAN ASSISTANT

## 2018-11-14 PROCEDURE — 94640 AIRWAY INHALATION TREATMENT: CPT

## 2018-11-14 PROCEDURE — 72148 MRI LUMBAR SPINE W/O DYE: CPT

## 2018-11-14 PROCEDURE — 74011636637 HC RX REV CODE- 636/637: Performed by: FAMILY MEDICINE

## 2018-11-14 PROCEDURE — 74011250636 HC RX REV CODE- 250/636: Performed by: INTERNAL MEDICINE

## 2018-11-14 PROCEDURE — 74011000250 HC RX REV CODE- 250: Performed by: INTERNAL MEDICINE

## 2018-11-14 PROCEDURE — 96361 HYDRATE IV INFUSION ADD-ON: CPT

## 2018-11-14 PROCEDURE — 36415 COLL VENOUS BLD VENIPUNCTURE: CPT

## 2018-11-14 PROCEDURE — 96376 TX/PRO/DX INJ SAME DRUG ADON: CPT

## 2018-11-14 PROCEDURE — 80048 BASIC METABOLIC PNL TOTAL CA: CPT

## 2018-11-14 PROCEDURE — 72146 MRI CHEST SPINE W/O DYE: CPT

## 2018-11-14 PROCEDURE — 82962 GLUCOSE BLOOD TEST: CPT

## 2018-11-14 PROCEDURE — 74011636637 HC RX REV CODE- 636/637: Performed by: INTERNAL MEDICINE

## 2018-11-14 RX ORDER — SODIUM CHLORIDE 9 MG/ML
75 INJECTION, SOLUTION INTRAVENOUS CONTINUOUS
Status: DISCONTINUED | OUTPATIENT
Start: 2018-11-14 | End: 2018-11-17

## 2018-11-14 RX ADMIN — IPRATROPIUM BROMIDE AND ALBUTEROL SULFATE 3 ML: .5; 3 SOLUTION RESPIRATORY (INHALATION) at 08:33

## 2018-11-14 RX ADMIN — CARVEDILOL 6.25 MG: 6.25 TABLET, FILM COATED ORAL at 17:35

## 2018-11-14 RX ADMIN — BUDESONIDE 500 MCG: 0.5 INHALANT RESPIRATORY (INHALATION) at 21:24

## 2018-11-14 RX ADMIN — INSULIN LISPRO 10 UNITS: 100 INJECTION, SOLUTION INTRAVENOUS; SUBCUTANEOUS at 18:31

## 2018-11-14 RX ADMIN — METHYLPREDNISOLONE SODIUM SUCCINATE 60 MG: 40 INJECTION, POWDER, FOR SOLUTION INTRAMUSCULAR; INTRAVENOUS at 12:39

## 2018-11-14 RX ADMIN — ISOSORBIDE MONONITRATE 30 MG: 30 TABLET, EXTENDED RELEASE ORAL at 08:16

## 2018-11-14 RX ADMIN — PANTOPRAZOLE SODIUM 40 MG: 40 TABLET, DELAYED RELEASE ORAL at 17:35

## 2018-11-14 RX ADMIN — SODIUM CHLORIDE 75 ML/HR: 900 INJECTION, SOLUTION INTRAVENOUS at 09:23

## 2018-11-14 RX ADMIN — LISINOPRIL 5 MG: 5 TABLET ORAL at 08:16

## 2018-11-14 RX ADMIN — Medication 10 ML: at 23:11

## 2018-11-14 RX ADMIN — MONTELUKAST SODIUM 10 MG: 10 TABLET, COATED ORAL at 08:16

## 2018-11-14 RX ADMIN — SODIUM CHLORIDE 75 ML/HR: 900 INJECTION, SOLUTION INTRAVENOUS at 12:33

## 2018-11-14 RX ADMIN — SAXAGLIPTIN 5 MG: 5 TABLET, FILM COATED ORAL at 08:16

## 2018-11-14 RX ADMIN — INSULIN LISPRO 7 UNITS: 100 INJECTION, SOLUTION INTRAVENOUS; SUBCUTANEOUS at 12:38

## 2018-11-14 RX ADMIN — SODIUM CHLORIDE 75 ML/HR: 900 INJECTION, SOLUTION INTRAVENOUS at 23:11

## 2018-11-14 RX ADMIN — OXYCODONE AND ACETAMINOPHEN 1 TABLET: 5; 325 TABLET ORAL at 18:36

## 2018-11-14 RX ADMIN — INSULIN LISPRO 7 UNITS: 100 INJECTION, SOLUTION INTRAVENOUS; SUBCUTANEOUS at 08:02

## 2018-11-14 RX ADMIN — CLOPIDOGREL BISULFATE 75 MG: 75 TABLET ORAL at 08:16

## 2018-11-14 RX ADMIN — IPRATROPIUM BROMIDE AND ALBUTEROL SULFATE 3 ML: .5; 3 SOLUTION RESPIRATORY (INHALATION) at 20:56

## 2018-11-14 RX ADMIN — METHYLPREDNISOLONE SODIUM SUCCINATE 80 MG: 40 INJECTION, POWDER, FOR SOLUTION INTRAMUSCULAR; INTRAVENOUS at 08:04

## 2018-11-14 RX ADMIN — METHYLPREDNISOLONE SODIUM SUCCINATE 60 MG: 40 INJECTION, POWDER, FOR SOLUTION INTRAMUSCULAR; INTRAVENOUS at 23:19

## 2018-11-14 RX ADMIN — IPRATROPIUM BROMIDE AND ALBUTEROL SULFATE 3 ML: .5; 3 SOLUTION RESPIRATORY (INHALATION) at 13:52

## 2018-11-14 RX ADMIN — DULOXETINE 60 MG: 30 CAPSULE, DELAYED RELEASE ORAL at 08:16

## 2018-11-14 RX ADMIN — CARVEDILOL 6.25 MG: 6.25 TABLET, FILM COATED ORAL at 08:16

## 2018-11-14 RX ADMIN — INSULIN LISPRO 7 UNITS: 100 INJECTION, SOLUTION INTRAVENOUS; SUBCUTANEOUS at 23:10

## 2018-11-14 RX ADMIN — ARFORMOTEROL TARTRATE 15 MCG: 15 SOLUTION RESPIRATORY (INHALATION) at 08:33

## 2018-11-14 RX ADMIN — METHYLPREDNISOLONE SODIUM SUCCINATE 60 MG: 40 INJECTION, POWDER, FOR SOLUTION INTRAMUSCULAR; INTRAVENOUS at 17:35

## 2018-11-14 RX ADMIN — METHYLPREDNISOLONE SODIUM SUCCINATE 80 MG: 40 INJECTION, POWDER, FOR SOLUTION INTRAMUSCULAR; INTRAVENOUS at 03:35

## 2018-11-14 RX ADMIN — Medication 10 ML: at 06:00

## 2018-11-14 RX ADMIN — PANTOPRAZOLE SODIUM 40 MG: 40 TABLET, DELAYED RELEASE ORAL at 07:30

## 2018-11-14 RX ADMIN — BUDESONIDE 500 MCG: 0.5 INHALANT RESPIRATORY (INHALATION) at 08:33

## 2018-11-14 NOTE — DIABETES MGMT
DTC Progress Note Recommendations/ Comments: If appropriate, please consider adding NPH 12 units bid while on IV steroids. Pt has required 26 units of correction over the past 18 hours. Current hospital DM medication:  
-Humalog insulin resistant correction  
-Solu-medrol 60mg every 6 hours Chart reviewed on [facility]. Patient is a 81 y.o. female with known history of Type 2 Diabetes on Onglyza 5mg daily at home. A1c:  
Lab Results Component Value Date/Time Hemoglobin A1c 8.5 (H) 11/13/2018 03:26 PM  
 Hemoglobin A1c 6.4 (H) 06/27/2018 10:48 AM  
 
 
Recent Glucose Results:  
Lab Results Component Value Date/Time  (H) 11/13/2018 03:26 PM  
 GLUCPOC 278 (H) 11/14/2018 12:29 PM  
 GLUCPOC 278 (H) 11/14/2018 07:51 AM  
 GLUCPOC 304 (H) 11/13/2018 09:28 PM  
  
 
Lab Results Component Value Date/Time Creatinine 1.75 (H) 11/13/2018 03:26 PM  
 
Estimated Creatinine Clearance: 38.3 mL/min (A) (based on SCr of 1.75 mg/dL (H)). Active Orders Diet DIET CARDIAC Regular; Consistent Carb 1800kcal  
  
 
PO intake: No data found. Will continue to follow as needed. Thank you Jina Rosario RD, CDE [facility] Office: [phone] Pager: [phone]

## 2018-11-14 NOTE — PROGRESS NOTES
Name: Gabriella Sun: [address] [place] [address] Dr HORNB: 1945 Admit Date: 2018 Phone: [phone]  Room: 798 [id number] [facility] PCP: Miya Simeon MD  MRN: [id number] Date: 2018  Code: Full Code Chart and notes reviewed. Data reviewed. I review the patient's current medications in the medical record at each encounter. I have evaluated and examined the patient. Overnight events Afebrile BP elevated Sats 96% on 2L 
RVP negative V/Q scan low probability ROS: Reports feeling much better this morning. Thinks O2 and steroids are helpful. Reports lots of UOP - not receiving diuretics. Denies fever, chills, or cough. Denies wheeze. Denies CP. Denies abd pain. Reports back pain. Having spine MRI to eval for potential kyphoplasty. Reports LE edema/erythema to be at her baseline. Past Medical History:  
Diagnosis Date  Asthma  Autoimmune disease ([address]   
 fibromyalgia  CAD (coronary artery disease) 10/9/2015  Closed wedge compression fracture of T7 vertebra ([address] 2018  Diabetes [facility]  DVT (deep vein thrombosis) in pregnancy ([facility]  GERD (gastroesophageal reflux disease)  HTN (hypertension)  NSTEMI (non-ST elevated myocardial infarction) ([address] 10/9/2015  Obesity (BMI 30-39.9) 2018  Sleep apnea   
 wears CPAP Past Surgical History:  
Procedure Laterality Date  ABDOMEN SURGERY PROC UNLISTED    
 hital hernia repair, gall bladder removed  BREAST SURGERY PROCEDURE UNLISTED    
 lumpectomy  CARDIAC SURG PROCEDURE UNLIST  10/9/15  
 2 stents [address]  HX COLOSTOMY    
 and reversal, post episiotomy repair [id number]  HX UROLOGICAL  2009  
 bladder sling Family History Problem Relation Age of Onset  Diabetes Mother  Heart Failure Mother  Kidney Disease Mother  Diabetes Father  Heart Disease Father  Elevated Lipids Father  Heart Failure Father  Heart Disease Brother  Cancer Brother   
     kidney  Diabetes Brother  No Known Problems Brother  No Known Problems Brother Social History Tobacco Use  Smoking status: Former Smoker Last attempt to quit: 3/30/2017 Years since quittin.6  Smokeless tobacco: Never Used Substance Use Topics  Alcohol use: No  
  Alcohol/week: 0.0 oz  
  Comment: very very rarely Allergies Allergen Reactions  Advair Diskus [Fluticasone-Salmeterol] Rash  Aspartame Other (comments)  Ciprofloxacin Rash  Statins-Hmg-Coa Reductase Inhibitors Other (comments) Muscle pain Lipitor/crestor/zocor  Sulfa (Sulfonamide Antibiotics) Rash  Tetracycline Other (comments) musclepain  Zetia [Ezetimibe] Myalgia Current Facility-Administered Medications Medication Dose Route Frequency  arformoterol (BROVANA) neb solution 15 mcg  15 mcg Nebulization BID RT  
 budesonide (PULMICORT) 500 mcg/2 ml nebulizer suspension  500 mcg Nebulization BID RT  
 albuterol-ipratropium (DUO-NEB) 2.5 MG-0.5 MG/3 ML  3 mL Nebulization Q6H RT  
 methylPREDNISolone (PF) (SOLU-MEDROL) injection 80 mg  80 mg IntraVENous Q6H  
 carvedilol (COREG) tablet 6.25 mg  6.25 mg Oral BID WITH MEALS  clopidogrel (PLAVIX) tablet 75 mg  75 mg Oral DAILY  DULoxetine (CYMBALTA) capsule 60 mg  60 mg Oral DAILY  isosorbide mononitrate ER (IMDUR) tablet 30 mg  30 mg Oral DAILY  lisinopril (PRINIVIL, ZESTRIL) tablet 5 mg  5 mg Oral DAILY  montelukast (SINGULAIR) tablet 10 mg  10 mg Oral DAILY  sAXagliptin (ONGLYZA) tablet 5 mg  5 mg Oral DAILY  0.9% sodium chloride infusion  75 mL/hr IntraVENous CONTINUOUS  
 sodium chloride (NS) flush 5-10 mL  5-10 mL IntraVENous Q8H  
 sodium chloride (NS) flush 5-10 mL  5-10 mL IntraVENous PRN  
 acetaminophen (TYLENOL) tablet 650 mg  650 mg Oral Q4H PRN  
  oxyCODONE-acetaminophen (PERCOCET) 5-325 mg per tablet 1 Tab  1 Tab Oral Q4H PRN  
 HYDROmorphone (PF) (DILAUDID) injection 0.5 mg  0.5 mg IntraVENous Q4H PRN  prochlorperazine (COMPAZINE) injection 10 mg  10 mg IntraVENous Q6H PRN  
 zolpidem (AMBIEN) tablet 5 mg  5 mg Oral QHS PRN  
 insulin lispro (HUMALOG) injection   SubCUTAneous QID WITH MEALS  glucose chewable tablet 16 g  4 Tab Oral PRN  
 dextrose (D50W) injection syrg 12.5-25 g  25-50 mL IntraVENous PRN  
 glucagon (GLUCAGEN) injection 1 mg  1 mg IntraMUSCular PRN  pantoprazole (PROTONIX) tablet 40 mg  40 mg Oral ACB&D Current Outpatient Medications Medication Sig  
 tiotropium (SPIRIVA WITH HANDIHALER) 18 mcg inhalation capsule Take 1 Cap by inhalation daily.  albuterol (PROVENTIL VENTOLIN) 2.5 mg /3 mL (0.083 %) nebulizer solution 2.5 mg by Nebulization route two (2) times a day.  carvedilol (COREG) 12.5 mg tablet Take 6.25 mg by mouth two (2) times daily (with meals).  montelukast (SINGULAIR) 10 mg tablet Take 10 mg by mouth daily.  bumetanide (BUMEX) 0.5 mg tablet Take 1 mg by mouth daily as needed.  isosorbide mononitrate ER (IMDUR) 30 mg tablet TAKE 1 TABLET BY MOUTH ONCE DAILY AS DIRECTED  omeprazole (PRILOSEC) 20 mg capsule Take 20 mg by mouth two (2) times a day.  albuterol (PROAIR HFA) 90 mcg/actuation inhaler Take  by inhalation every four (4) hours as needed for Wheezing.  clopidogrel (PLAVIX) 75 mg tab Take 75 mg by mouth daily.  rivaroxaban (XARELTO) 15 mg tab tablet Take 15 mg by mouth daily (with dinner).  lactobacillus rhamnosus gg 10 billion cell (CULTURELLE) 10 billion cell capsule Take 1 Cap by mouth daily.  promethazine (PHENERGAN) 25 mg tablet Take 25 mg by mouth every six (6) hours as needed for Nausea.  HYDROcodone-acetaminophen (NORCO) 5-325 mg per tablet Take 1 Tab by mouth every four (4) hours as needed for Pain.  acetaminophen (TYLENOL) 325 mg tablet Take 650 mg by mouth every six (6) hours as needed for Pain.  loperamide (IMMODIUM) 2 mg tablet Take 2 mg by mouth two (2) times a day.  potassium chloride (K-DUR, KLOR-CON) 20 mEq tablet Take 20 mEq by mouth daily (with dinner).  lisinopril (PRINIVIL, ZESTRIL) 5 mg tablet TAKE 1 TABLET BY MOUTH DAILY  biotin 10,000 mcg cap Take 1 Cap by mouth daily.  saxagliptin (ONGLYZA) 5 mg tab tablet Take 5 mg by mouth daily. Takes at [employer]  DULoxetine (CYMBALTA) 60 mg capsule Take 60 mg by mouth daily.  cholecalciferol (VITAMIN D3) 1,000 unit tablet Take 1,000 Units by mouth daily.  azelastine-fluticasone (DYMISTA) 137-50 mcg/spray spry 1 Spray by Nasal route two (2) times a day.  mometasone-formoterol (DULERA) 200-5 mcg/actuation HFA inhaler Take 2 Puffs by inhalation two (2) times a day.  predniSONE (DELTASONE) 10 mg tablet Take 20 mg by mouth daily. REVIEW OF SYSTEMS  
12 point ROS negative except as stated in the HPI. Physical Exam:  
Visit Vitals BP (!) 162/97 (BP 1 Location: Right arm, BP Patient Position: At rest) Pulse 97 Temp 98.3 °F (36.8 °C) Resp 20 Ht 5' 7\" (1.702 m) Wt 110.2 kg (243 lb) SpO2 96% BMI 38.06 kg/m² General:  Alert, cooperative, no distress, appears stated age. Head:  Normocephalic, without obvious abnormality, atraumatic. Eyes:  Conjunctivae/corneas clear. Nose: Nares normal. Septum midline. Mucosa normal.   
Throat: Lips, mucosa, and tongue normal.   
Neck: Supple, symmetrical, trachea midline, no adenopathy. Lungs:   Diminished with increased WOB at rest.  
Chest wall:  No tenderness or deformity. Heart:  Regular rate and rhythm, S1, S2 normal, no murmur, click, rub or gallop. Abdomen:   Soft, non-tender. Bowel sounds normal. No masses,  No organomegaly. Extremities: Extremities normal, atraumatic, no cyanosis or edema. Pulses: 2+ and symmetric all extremities. Skin: Skin color, texture, turgor normal. No rashes or lesions Lymph nodes: Cervical, supraclavicular nodes normal.  
Neurologic: Grossly nonfocal  
 
 
Lab Results Component Value Date/Time Sodium 138 11/13/2018 03:26 PM  
 Potassium 4.6 11/13/2018 03:26 PM  
 Chloride 101 11/13/2018 03:26 PM  
 CO2 28 11/13/2018 03:26 PM  
 BUN 36 (H) 11/13/2018 03:26 PM  
 Creatinine 1.75 (H) 11/13/2018 03:26 PM  
 Glucose 350 (H) 11/13/2018 03:26 PM  
 Calcium 8.5 11/13/2018 03:26 PM  
 Magnesium 2.0 11/13/2018 03:26 PM  
 Lactic acid 1.4 06/28/2018 04:22 AM  
 
 
Lab Results Component Value Date/Time WBC 13.9 (H) 11/13/2018 03:26 PM  
 HGB 13.0 11/13/2018 03:26 PM  
 PLATELET 945 94/63/9141 03:26 PM  
 MCV 94.7 11/13/2018 03:26 PM  
 
 
Lab Results Component Value Date/Time INR 1.1 10/17/2018 12:53 PM  
 AST (SGOT) 24 11/13/2018 03:26 PM  
 Alk. phosphatase 167 (H) 11/13/2018 03:26 PM  
 Protein, total 6.7 11/13/2018 03:26 PM  
 Albumin 3.1 (L) 11/13/2018 03:26 PM  
 Globulin 3.6 11/13/2018 03:26 PM  
 
 
No results found for: IRON, FE, TIBC, IBCT, PSAT, FERR Lab Results Component Value Date/Time TSH 0.58 11/13/2018 03:26 PM  
  
 
No results found for: PH, PHI, PCO2, PCO2I, PO2, PO2I, HCO3, HCO3I, FIO2, FIO2I Lab Results Component Value Date/Time CK 25 (L) 11/13/2018 03:26 PM  
 CK-MB Index 4.4 (H) 11/13/2018 03:26 PM  
 Troponin-I, Qt. <0.05 11/13/2018 03:26 PM  
  
 
Lab Results Component Value Date/Time Culture result: ESCHERICHIA COLI (PREDOMINATING) (A) 06/28/2018 07:01 AM  
 Culture result: ENTEROBACTER AEROGENES (A) 06/28/2018 07:01 AM  
 Culture result: (A) 06/28/2018 04:13 AM  
  ENTEROBACTER AEROGENES GROWING IN 1 OF 4 BOTTLES DRAWN (SITE=L UPPER ARM) Culture result: (A) 06/28/2018 04:13 AM  
  PRELIMINARY REPORT OF 
GRAM NEGATIVE COCCOBACILLI 
GROWING IN 1 OF 4 BOTTLES DRAWN 
CALLED TO AND READ BACK BY 
CHANG HUNTLEY RN [facility] AT [id number] ON 6/28/2018.  [id number] 
  
 Culture result: REMAINING BOTTLE(S) HAS/HAVE NO GROWTH IN 5 DAYS 06/28/2018 04:13 AM  
 
 
No results found for: TOXA1, RPR, HBCM, HBSAG, HAAB, HCAB1, HAAT, G6PD, CRYAC, HIVGT, HIVR, HIV1, HIV12, HIVPC, HIVRPI Lab Results Component Value Date/Time CK 25 (L) 11/13/2018 03:26 PM  
 
 
Lab Results Component Value Date/Time Color YELLOW/STRAW 11/13/2018 07:58 PM  
 Appearance CLOUDY (A) 11/13/2018 07:58 PM  
 Specific gravity 1.020 04/16/2017 01:13 PM  
 pH (UA) 5.0 11/13/2018 07:58 PM  
 Protein TRACE (A) 11/13/2018 07:58 PM  
 Glucose >1,000 (A) 11/13/2018 07:58 PM  
 Ketone NEGATIVE  11/13/2018 07:58 PM  
 Bilirubin NEGATIVE  11/13/2018 07:58 PM  
 Blood TRACE (A) 11/13/2018 07:58 PM  
 Urobilinogen 0.2 11/13/2018 07:58 PM  
 Nitrites NEGATIVE  11/13/2018 07:58 PM  
 Leukocyte Esterase MODERATE (A) 11/13/2018 07:58 PM  
 WBC  11/13/2018 07:58 PM  
 RBC 0-5 11/13/2018 07:58 PM  
 Bacteria 4+ (A) 11/13/2018 07:58 PM  
 
 
IMPRESSION 
· COPD exacerbation · Shortness of breath · Pulmonary hypertension, likely secondary to underlying obstructive lung disease and HAYES, but does have a history of CTD · History of DVT, on Xarelto · NAIDA · Closed wedge compression fracture of T7 vertebra PLAN 
· Goal sats >90% · Duonebs; brovana/pulmicort · Singulair · Wean IV steroids to 60 mg q 6hr · No indication for antibiotics at this time · Gentle IVFs; recheck BNP - trend creat · Cardiac diet · Ortho following and planning MRI eval for kyphoplasty · Will need to consider treatment of PH if symptoms persist 
· CPAP nightly and with naps for known HAYES · PPI 
· On xarelto chronically [facility] [place] [place]

## 2018-11-14 NOTE — PROGRESS NOTES
Daily Progress Note: 11/14/2018 Alejandra Gilford Gar Ramming, MD 
 
Assessment/Plan: Dyspnea (11/13/2018) - workup with Pul and Card. - likely some degree of COPD and Pul hypertension and due to supra-morbid obesity and                 some due to hypoventilation due to back pain - Pulmonary following and added IV steroids trial; V/Q scan with low prob for PE 
            - consulted Orthopedics - may benefit from Kyphoplasty  
  
  Type II diabetes mellitus ([facility] (10/9/2015) - with reported nephropathy with proteinuria without CKD 
            - continue saxagliptin and follow on SSI 
  
  Coronary artery disease involving native coronary artery without angina pectoris (1/22/2016) - stable, continue cardiac meds 
  
  Essential hypertension (1/22/2016) - cont home meds - adjust as needed 
  
  Chronic anticoagulation (3/30/2018) - hold Xarelto for possible kyphoplasty 
  
  Obesity, BMI 30-39. 9(HCC) (10/1/2018) - counseled on weight loss, this is contributing to her current symptoms 
            - looks very [place]wickian  
  
  ARF (acute renal failure) ([facility] (11/13/2018) - creatinine up acutely from prior, possibly due to diuresis 
            - hold diuretic  
  
  Closed wedge compression fracture of T7 vertebra ([facility] (11/13/2018) - consulted Orthopedics, hopefully she will be a candidate for kyphoplasty Code status: 
            - patient is FULL CODE, no AMD on file, her daughter Cookie Foreman is her surrogate 
   
 
Problem List: 
Problem List as of 11/14/2018 Date Reviewed: 11/13/2018 Codes Class Noted - Resolved * (Principal) Dyspnea ICD-10-CM: R06.00 
ICD-9-CM: 786.09  11/13/2018 - Present ARF (acute renal failure) (HCC) ICD-10-CM: N17.9 ICD-9-CM: 584.9  11/13/2018 - Present Obesity (BMI 30-39.9) (Chronic) ICD-10-CM: N21.3 ICD-9-CM: 278.00  11/13/2018 - Present Closed wedge compression fracture of T7 vertebra ([facility] ICD-10-CM: O46.286G ICD-9-CM: 805.2  11/13/2018 - Present Type 2 diabetes with nephropathy ([facility] ICD-10-CM: E11.21 
ICD-9-CM: 250.40, 583.81  10/1/2018 - Present Chest pain ICD-10-CM: R07.9 ICD-9-CM: 786.50  6/27/2018 - Present Generalized abdominal pain ICD-10-CM: R10.84 ICD-9-CM: 789.07  6/27/2018 - Present Recurrent deep vein thrombosis (DVT) (HCC) ICD-10-CM: I82.409 ICD-9-CM: 453.40  3/30/2018 - Present Chronic anticoagulation (Chronic) ICD-10-CM: Z79.01 
ICD-9-CM: V58.61  3/30/2018 - Present Coronary artery disease involving native coronary artery without angina pectoris (Chronic) ICD-10-CM: I25.10 ICD-9-CM: 414.01  1/22/2016 - Present Essential hypertension (Chronic) ICD-10-CM: I10 
ICD-9-CM: 401.9  1/22/2016 - Present Type II diabetes mellitus (HCC) (Chronic) ICD-10-CM: E11.9 ICD-9-CM: 250.00  10/9/2015 - Present NSTEMI (non-ST elevated myocardial infarction) ([facility] ICD-10-CM: I21.4 ICD-9-CM: 410.70  10/9/2015 - Present RESOLVED: CAD (coronary artery disease) ICD-10-CM: I25.10 ICD-9-CM: 414.00  10/9/2015 - 1/22/2016 RESOLVED: HTN (hypertension) ICD-10-CM: I10 
ICD-9-CM: 401.9  10/9/2015 - 1/22/2016 Subjective:  
  81 y.o.  female who is admitted with SOB. Ms. Beto Reece presented to the Emergency Department this PM complaining of SOB: for the past 3 months, worked up by Pulmonary and Cardiology as an outpatient without any definite diagnosis, not hypoxic, associated with back pain, unable to take a deep breath without pain, back pain started at the same time as the SOB. History obtained from chart review and the patient. Previous records reviewed, recent admit for chest pain. (Dr Claudeen Hsu). 11/14:  Feels much better this AM with less back pain on meds.   Still very tender over site with any palpation. Less air hunger but still \"not breathing back to normal yet. \"  Glucoses up with the steroids. Pul and Card seeing also.   
  
Review of Systems: A comprehensive review of systems was negative except for that written in the HPI. Objective:  
Physical Exam:  
 
Visit Vitals BP (!) 176/97 Pulse 100 Temp 97.6 °F (36.4 °C) Resp 18 Ht 5' 7\" (1.702 m) Wt 110.2 kg (243 lb) SpO2 96% BMI 38.06 kg/m² O2 Flow Rate (L/min): 3 l/min O2 Device: Nasal cannula Temp (24hrs), Av.8 °F (36.6 °C), Min:97.6 °F (36.4 °C), Max:98.1 °F (36.7 °C) No intake/output data recorded. No intake/output data recorded. General:  Alert, cooperative, obese, no distress, appears stated age. Head:  Normocephalic, without obvious abnormality, atraumatic. Eyes:  Conjunctivae/corneas clear. PERRL, EOMs intact. Throat: Lips, mucosa, and tongue moist.. Neck: Supple, symmetrical, trachea midline, no adenopathy, thyroid: no enlargement/tenderness/nodules, no carotid bruit and no JVD. Back:   Symmetric,with marked tenderness to palpation over T6-7-8 area. Lungs:   Diminished BS bilaterally but otherwise clear at this time. Chest wall:  No tenderness or deformity. Heart:  Regular rate and rhythm, S1, S2 normal, no murmur, click, rub or gallop. Abdomen:   Soft, non-tender. Bowel sounds normal. No masses,  No organomegaly. Extremities: no cyanosis. Trace LE edema. No calf tenderness or cords. Skin:  turgor normal.   
Neurologic: CNII-XII intact. Alert and oriented X 3. Fine motor of hands and fingers normal.   equal.  No cogwheeling or rigidity. Gait not tested at this time. Sensation grossly normal to touch. Gross motor of extremities normal.    
 
Data Review:  
  CT of Chest 18 FINDINGS: 
THYROID: No nodule. MEDIASTINUM: No mass or lymphadenopathy. SONJA: No mass or lymphadenopathy. THORACIC AORTA: No aneurysm. Mild vascular calcifications MAIN PULMONARY ARTERY: Normal in caliber. TRACHEA/BRONCHI: Patent. ESOPHAGUS: No wall thickening or dilatation. Small hiatal hernia HEART: Normal in size. PLEURA: No effusion or pneumothorax. LUNGS: No nodule, mass, or airspace disease. Bilateral atelectatic changes. INCIDENTALLY IMAGED UPPER ABDOMEN: No focal abnormality. BONES: Bones are osteopenic. There is collapse of the superior endplate of T7. This has occurred since June 2018 Possible milk of calcium cyst within the right breast 
IMPRESSION: 
1. No acute cardiopulmonary disease 2. Interval collapse superior endplate of T7. The patient has back pain this may 
be acute to subacute and MR imaging would better characterize 
  
Recent Days: 
Recent Labs 11/13/18 
1526 WBC 13.9* HGB 13.0 HCT 42.5  Recent Labs 11/13/18 
1526   
K 4.6  CO2 28 * BUN 36* CREA 1.75* CA 8.5 MG 2.0 ALB 3.1* TBILI 0.6 SGOT 24 ALT 72 No results for input(s): PH, PCO2, PO2, HCO3, FIO2 in the last 72 hours. 24 Hour Results: 
Recent Results (from the past 24 hour(s)) CBC WITH AUTOMATED DIFF Collection Time: 11/13/18  3:26 PM  
Result Value Ref Range WBC 13.9 (H) 3.6 - 11.0 K/uL  
 RBC 4.49 3.80 - 5.20 M/uL  
 HGB 13.0 11.5 - 16.0 g/dL HCT 42.5 35.0 - 47.0 % MCV 94.7 80.0 - 99.0 FL  
 MCH 29.0 26.0 - 34.0 PG  
 MCHC 30.6 30.0 - 36.5 g/dL  
 RDW 15.7 (H) 11.5 - 14.5 % PLATELET 512 605 - 863 K/uL MPV 9.9 8.9 - 12.9 FL  
 NRBC 0.0 0  WBC ABSOLUTE NRBC 0.00 0.00 - 0.01 K/uL NEUTROPHILS 84 (H) 32 - 75 % BAND NEUTROPHILS 4 0 - 6 % LYMPHOCYTES 8 (L) 12 - 49 % MONOCYTES 4 (L) 5 - 13 % EOSINOPHILS 0 0 - 7 % BASOPHILS 0 0 - 1 % IMMATURE GRANULOCYTES 0 %  
 ABS. NEUTROPHILS 12.2 (H) 1.8 - 8.0 K/UL  
 ABS. LYMPHOCYTES 1.1 0.8 - 3.5 K/UL  
 ABS. MONOCYTES 0.6 0.0 - 1.0 K/UL  
 ABS. EOSINOPHILS 0.0 0.0 - 0.4 K/UL  
 ABS. BASOPHILS 0.0 0.0 - 0.1 K/UL ABS. IMM. GRANS. 0.0 K/UL  
 DF MANUAL    
 RBC COMMENTS NORMOCYTIC, NORMOCHROMIC    
CK W/ CKMB & INDEX Collection Time: 11/13/18  3:26 PM  
Result Value Ref Range CK 25 (L) 26 - 192 U/L  
 CK - MB 1.1 <3.6 NG/ML  
 CK-MB Index 4.4 (H) 0 - 2.5 METABOLIC PANEL, COMPREHENSIVE Collection Time: 11/13/18  3:26 PM  
Result Value Ref Range Sodium 138 136 - 145 mmol/L Potassium 4.6 3.5 - 5.1 mmol/L Chloride 101 97 - 108 mmol/L  
 CO2 28 21 - 32 mmol/L Anion gap 9 5 - 15 mmol/L Glucose 350 (H) 65 - 100 mg/dL BUN 36 (H) 6 - 20 MG/DL Creatinine 1.75 (H) 0.55 - 1.02 MG/DL  
 BUN/Creatinine ratio 21 (H) 12 - 20 GFR est AA 35 (L) >60 ml/min/1.73m2 GFR est non-AA 29 (L) >60 ml/min/1.73m2 Calcium 8.5 8.5 - 10.1 MG/DL Bilirubin, total 0.6 0.2 - 1.0 MG/DL  
 ALT (SGPT) 72 12 - 78 U/L  
 AST (SGOT) 24 15 - 37 U/L Alk. phosphatase 167 (H) 45 - 117 U/L Protein, total 6.7 6.4 - 8.2 g/dL Albumin 3.1 (L) 3.5 - 5.0 g/dL Globulin 3.6 2.0 - 4.0 g/dL A-G Ratio 0.9 (L) 1.1 - 2.2 MAGNESIUM Collection Time: 11/13/18  3:26 PM  
Result Value Ref Range Magnesium 2.0 1.6 - 2.4 mg/dL NT-PRO BNP Collection Time: 11/13/18  3:26 PM  
Result Value Ref Range NT pro- (H) 0 - 125 PG/ML  
TROPONIN I Collection Time: 11/13/18  3:26 PM  
Result Value Ref Range Troponin-I, Qt. <0.05 <0.05 ng/mL TSH 3RD GENERATION Collection Time: 11/13/18  3:26 PM  
Result Value Ref Range TSH 0.58 0.36 - 3.74 uIU/mL EKG, 12 LEAD, INITIAL Collection Time: 11/13/18  4:09 PM  
Result Value Ref Range Ventricular Rate 109 BPM  
 Atrial Rate 109 BPM  
 P-R Interval 128 ms QRS Duration 76 ms  
 Q-T Interval 344 ms QTC Calculation (Bezet) 463 ms Calculated P Axis 64 degrees Calculated R Axis -66 degrees Calculated T Axis 50 degrees Diagnosis Sinus tachycardia Right atrial enlargement Pulmonary disease pattern Left anterior fascicular block Abnormal ECG When compared with ECG of 27-JUN-2018 10:35, No significant change was found GLUCOSE, POC Collection Time: 11/13/18  6:42 PM  
Result Value Ref Range Glucose (POC) 299 (H) 65 - 100 mg/dL Performed by Chepe [facility] RESPIRATORY PANEL,PCR,NASOPHARYNGEAL Collection Time: 11/13/18  7:03 PM  
Result Value Ref Range Adenovirus NOT DETECTED NOTD Coronavirus 229E NOT DETECTED NOTD Coronavirus HKU1 NOT DETECTED NOTD Coronavirus CVNL63 NOT DETECTED NOTD Coronavirus OC43 NOT DETECTED NOTD Metapneumovirus NOT DETECTED NOTD Rhinovirus and Enterovirus NOT DETECTED NOTD Influenza A NOT DETECTED NOTD Influenza A, subtype H1 NOT DETECTED NOTD Influenza A, subtype H3 NOT DETECTED NOTD INFLUENZA A H1N1 PCR NOT DETECTED NOTD Influenza B NOT DETECTED NOTD Parainfluenza 1 NOT DETECTED NOTD Parainfluenza 2 NOT DETECTED NOTD Parainfluenza 3 NOT DETECTED NOTD Parainfluenza virus 4 NOT DETECTED NOTD    
 RSV by PCR NOT DETECTED NOTD Bordetella pertussis - PCR NOT DETECTED NOTD Chlamydophila pneumoniae DNA, QL, PCR NOT DETECTED NOTD Mycoplasma pneumoniae DNA, QL, PCR NOT DETECTED NOTD URINALYSIS W/MICROSCOPIC Collection Time: 11/13/18  7:58 PM  
Result Value Ref Range Color YELLOW/STRAW Appearance CLOUDY (A) CLEAR Specific gravity 1.025 1.003 - 1.030    
 pH (UA) 5.0 5.0 - 8.0 Protein TRACE (A) NEG mg/dL Glucose >1,000 (A) NEG mg/dL Ketone NEGATIVE  NEG mg/dL Bilirubin NEGATIVE  NEG Blood TRACE (A) NEG Urobilinogen 0.2 0.2 - 1.0 EU/dL Nitrites NEGATIVE  NEG Leukocyte Esterase MODERATE (A) NEG    
 WBC  0 - 4 /hpf  
 RBC 0-5 0 - 5 /hpf Epithelial cells MODERATE (A) FEW /lpf Bacteria 4+ (A) NEG /hpf URINE CULTURE HOLD SAMPLE Collection Time: 11/13/18  7:58 PM  
Result Value Ref Range Urine culture hold URINE ON HOLD IN MICROBIOLOGY DEPT FOR 3 DAYS. IF UNPRESERVED URINE IS SUBMITTED, IT CANNOT BE USED FOR ADDITIONAL TESTING AFTER 24 HRS, RECOLLECTION WILL BE REQUIRED. GLUCOSE, POC Collection Time: 11/13/18  9:28 PM  
Result Value Ref Range Glucose (POC) 304 (H) 65 - 100 mg/dL Performed by Bharati Cabrera reviewed Current Facility-Administered Medications Medication Dose Route Frequency  arformoterol (BROVANA) neb solution 15 mcg  15 mcg Nebulization BID RT  
 budesonide (PULMICORT) 500 mcg/2 ml nebulizer suspension  500 mcg Nebulization BID RT  
 albuterol-ipratropium (DUO-NEB) 2.5 MG-0.5 MG/3 ML  3 mL Nebulization Q6H RT  
 methylPREDNISolone (PF) (SOLU-MEDROL) injection 80 mg  80 mg IntraVENous Q6H  
 carvedilol (COREG) tablet 6.25 mg  6.25 mg Oral BID WITH MEALS  clopidogrel (PLAVIX) tablet 75 mg  75 mg Oral DAILY  DULoxetine (CYMBALTA) capsule 60 mg  60 mg Oral DAILY  isosorbide mononitrate ER (IMDUR) tablet 30 mg  30 mg Oral DAILY  lisinopril (PRINIVIL, ZESTRIL) tablet 5 mg  5 mg Oral DAILY  montelukast (SINGULAIR) tablet 10 mg  10 mg Oral DAILY  sAXagliptin (ONGLYZA) tablet 5 mg  5 mg Oral DAILY  0.9% sodium chloride infusion  75 mL/hr IntraVENous CONTINUOUS  
 sodium chloride (NS) flush 5-10 mL  5-10 mL IntraVENous Q8H  
 sodium chloride (NS) flush 5-10 mL  5-10 mL IntraVENous PRN  
 acetaminophen (TYLENOL) tablet 650 mg  650 mg Oral Q4H PRN  
 oxyCODONE-acetaminophen (PERCOCET) 5-325 mg per tablet 1 Tab  1 Tab Oral Q4H PRN  
 HYDROmorphone (PF) (DILAUDID) injection 0.5 mg  0.5 mg IntraVENous Q4H PRN  prochlorperazine (COMPAZINE) injection 10 mg  10 mg IntraVENous Q6H PRN  
 zolpidem (AMBIEN) tablet 5 mg  5 mg Oral QHS PRN  
 insulin lispro (HUMALOG) injection   SubCUTAneous QID WITH MEALS  glucose chewable tablet 16 g  4 Tab Oral PRN  
 dextrose (D50W) injection syrg 12.5-25 g  25-50 mL IntraVENous PRN  
  glucagon (GLUCAGEN) injection 1 mg  1 mg IntraMUSCular PRN  pantoprazole (PROTONIX) tablet 40 mg  40 mg Oral ACB&D Current Outpatient Medications Medication Sig  
 tiotropium (SPIRIVA WITH HANDIHALER) 18 mcg inhalation capsule Take 1 Cap by inhalation daily.  albuterol (PROVENTIL VENTOLIN) 2.5 mg /3 mL (0.083 %) nebulizer solution 2.5 mg by Nebulization route two (2) times a day.  carvedilol (COREG) 12.5 mg tablet Take 6.25 mg by mouth two (2) times daily (with meals).  montelukast (SINGULAIR) 10 mg tablet Take 10 mg by mouth daily.  bumetanide (BUMEX) 0.5 mg tablet Take 1 mg by mouth daily as needed.  isosorbide mononitrate ER (IMDUR) 30 mg tablet TAKE 1 TABLET BY MOUTH ONCE DAILY AS DIRECTED  omeprazole (PRILOSEC) 20 mg capsule Take 20 mg by mouth two (2) times a day.  albuterol (PROAIR HFA) 90 mcg/actuation inhaler Take  by inhalation every four (4) hours as needed for Wheezing.  clopidogrel (PLAVIX) 75 mg tab Take 75 mg by mouth daily.  rivaroxaban (XARELTO) 15 mg tab tablet Take 15 mg by mouth daily (with dinner).  lactobacillus rhamnosus gg 10 billion cell (CULTURELLE) 10 billion cell capsule Take 1 Cap by mouth daily.  promethazine (PHENERGAN) 25 mg tablet Take 25 mg by mouth every six (6) hours as needed for Nausea.  HYDROcodone-acetaminophen (NORCO) 5-325 mg per tablet Take 1 Tab by mouth every four (4) hours as needed for Pain.  acetaminophen (TYLENOL) 325 mg tablet Take 650 mg by mouth every six (6) hours as needed for Pain.  loperamide (IMMODIUM) 2 mg tablet Take 2 mg by mouth two (2) times a day.  potassium chloride (K-DUR, KLOR-CON) 20 mEq tablet Take 20 mEq by mouth daily (with dinner).  lisinopril (PRINIVIL, ZESTRIL) 5 mg tablet TAKE 1 TABLET BY MOUTH DAILY  biotin 10,000 mcg cap Take 1 Cap by mouth daily.  saxagliptin (ONGLYZA) 5 mg tab tablet Take 5 mg by mouth daily. Takes at [employer]  DULoxetine (CYMBALTA) 60 mg capsule Take 60 mg by mouth daily.  cholecalciferol (VITAMIN D3) 1,000 unit tablet Take 1,000 Units by mouth daily.  azelastine-fluticasone (DYMISTA) 137-50 mcg/spray spry 1 Spray by Nasal route two (2) times a day.  mometasone-formoterol (DULERA) 200-5 mcg/actuation HFA inhaler Take 2 Puffs by inhalation two (2) times a day.  predniSONE (DELTASONE) 10 mg tablet Take 20 mg by mouth daily. Care Plan discussed with: Patient and Nurse Total time spent with patient: 30 minutes.  
 
Pradeep Dan MD

## 2018-11-14 NOTE — PROGRESS NOTES
11/14/2018 
8:43 AM 
Case management note Met with patient to discuss discharge planning. Confirmed demographics. Patient lives at [facility] in independent living. She has a rollator, cane and wheelchair. Patient does drive and performs ADL's independently. [facility] @ [address] for [employer] needs. Dr. Kentrell Banks for medical management. No NN Advance Directive/ not on file. OBS educated, letter signed and placed on chart Reason for Admission:   dyspnea RRAT Score:     26 Resources/supports as identified by patient/family:    
             
Top Challenges facing patient (as identified by patient/family and CM): Finances/Medication cost?      None noted Transportation? Family/ patient drives Support system or lack thereof?  family Living arrangements? Independent living Self-care/ADLs/Cognition? Can perform self care [facility] cognition Current Advanced Directive/Advance Care Plan:  Yes but not on file Plan for utilizing home health: Would benefit from hh, skilled nursing for disease management, education. PT/OT debility and to strengthen Likelihood of readmission: high/red Transition of Care Plan:       Most likely home with home health, out patient follow up. Care Management Interventions PCP Verified by CM: Yes(dr jignesh singh no nn) Mode of Transport at Discharge: Self Transition of Care Consult (CM Consult): Discharge Planning Current Support Network: Lives Alone Confirm Follow Up Transport: Family Plan discussed with Pt/Family/Caregiver: Yes Discharge Location Discharge Placement: Unable to determine at this time [facility], [address]

## 2018-11-14 NOTE — PROGRESS NOTES
Bedside shift change report given to Crystal Costa RN (oncoming nurse) by Molly Meckel RN (offgoing nurse). Report included the following information SBAR, Kardex, Intake/Output, MAR, Recent Results and Cardiac Rhythm ST.

## 2018-11-14 NOTE — PROGRESS NOTES
RN Kofi Childress) informed. Please complete MRI History and Safety Screening Form for this patient Using [employer] only under Orders Requiring a Screening Form: 
Example: 
Orders Requiring a Screening Form Procedure                    Order Status                      Form Status MRI EXAM                   Active                                In Progress Click on blue hyperlink as shown above to launch MRI screening form Answer all questions completely including Weight, Surgery, and Implant History. Document MUST be \"eSigned\" using a \"Signature Pad\" by the person completing form.  (patient and RN or MD completing form with the patient) Patient cannot be scanned until this form is completed and reviewed in MRI to ensure patient is SAFE and eligible for MRI. Call MRI when this has been successfully completed at [phone]. This must be done under KARDEX. Do not use the Nursing Flowsheet.

## 2018-11-14 NOTE — PROGRESS NOTES
0725- Bedside and Verbal shift change report given to Ivis Santos and Caleb Gaviria (oncoming nurse) by Vikas Wharton (offgoing nurse). Report included the following information SBAR, ED Summary, MAR and Recent Results. Pt resting in hospital bed. Call bell within reach. 0900- Pt assisted to bedside commode, linens and gown changed. Pt has loose bowel movement and assisted back to bed. Pt dyspneic and tachycardic with exertion, but able to maintain oxygen saturations above 90% on 2L nasal cannula. Pt reports she is normally short of breath with exertion. PIV infiltrated, removed and new PIV inserted. 1340- Pt has not yet received lunch tray, dietary called and will send meal tray. 1520- TRANSFER - OUT REPORT: 
 
Verbal report given to Rivka(name) on [facility]  being transferred to [facility](unit) for routine progression of care Report consisted of patients Situation, Background, Assessment and  
Recommendations(SBAR). Information from the following report(s) SBAR, Kardex, ED Summary, [facility] and Recent Results was reviewed with the receiving nurse. Lines:    
 
Opportunity for questions and clarification was provided. Patient transported with: 
 Monitor O2 @ 2 liters Registered Nurse

## 2018-11-14 NOTE — CONSULTS
ORTHOPEDIC CONSULT Subjective:  
 
Date of Consultation:  November 14, 2018 Referring Physician:  Dr. Luigi Lozano Kala Elliott is a 81 y.o. female who is being seen for a T7 compression fracture. She has a history of orthopedic issues and has been treated by Dr. Karlie Jaeger in the past.  She has a long medical history of dyspnea, obesity, recurrent DVT, CAD, DM-2, CAD, hypertension, osteopenia, and NSTEMI. She came to the ER after a thorough outpatient workup by pulmonary. She reports multiple rounds of prednisone for COPD exacerbation. This failed to improve his SOB and was subsequently referred to the ED. She had a chest CT that showed a T7 compression deformity and we have been consulted for recommendations. She denies bowel/bladder incontinence or leg weakness. Patient Active Problem List  
 Diagnosis Date Noted  Dyspnea 11/13/2018  ARF (acute renal failure) ([address] 11/13/2018  Obesity (BMI 30-39.9) 11/13/2018  Closed wedge compression fracture of T7 vertebra ([address] 11/13/2018  Type 2 diabetes with nephropathy ([address] 10/01/2018  Chest pain 06/27/2018  Generalized abdominal pain 06/27/2018  Recurrent deep vein thrombosis (DVT) ([address] 03/30/2018  Chronic anticoagulation 03/30/2018  Coronary artery disease involving native coronary artery without angina pectoris 01/22/2016  Essential hypertension 01/22/2016  Type II diabetes mellitus ([address] 10/09/2015  
 NSTEMI (non-ST elevated myocardial infarction) ([address] 10/09/2015 Family History Problem Relation Age of Onset  Diabetes Mother  Heart Failure Mother  Kidney Disease Mother  Diabetes Father  Heart Disease Father  Elevated Lipids Father  Heart Failure Father  Heart Disease Brother  Cancer Brother   
     kidney  Diabetes Brother  No Known Problems Brother  No Known Problems Brother Social History Tobacco Use  Smoking status: Former Smoker Last attempt to quit: 3/30/2017 Years since quittin.6  Smokeless tobacco: Never Used Substance Use Topics  Alcohol use: No  
  Alcohol/week: 0.0 oz  
  Comment: very very rarely Past Medical History:  
Diagnosis Date  Asthma  Autoimmune disease ([facility]   
 fibromyalgia  CAD (coronary artery disease) 10/9/2015  Closed wedge compression fracture of T7 vertebra ([facility] 2018  Diabetes ([facility]  DVT (deep vein thrombosis) in pregnancy ([facility]  GERD (gastroesophageal reflux disease)  HTN (hypertension)  NSTEMI (non-ST elevated myocardial infarction) ([facility] 10/9/2015  Obesity (BMI 30-39.9) 2018  Sleep apnea   
 wears CPAP Past Surgical History:  
Procedure Laterality Date  ABDOMEN SURGERY PROC UNLISTED    
 hital hernia repair, gall bladder removed  BREAST SURGERY PROCEDURE UNLISTED    
 lumpectomy  CARDIAC SURG PROCEDURE UNLIST  10/9/15  
 2 stents [place]  HX COLOSTOMY    
 and reversal, post episiotomy repair [facility]  HX UROLOGICAL  2009  
 bladder sling Prior to Admission medications Medication Sig Start Date End Date Taking? Authorizing Provider  
tiotropium (SPIRIVA WITH HANDIHALER) 18 mcg inhalation capsule Take 1 Cap by inhalation daily. Yes Provider, Historical  
albuterol (PROVENTIL VENTOLIN) 2.5 mg /3 mL (0.083 %) nebulizer solution 2.5 mg by Nebulization route two (2) times a day. Yes Provider, Historical  
carvedilol (COREG) 12.5 mg tablet Take 6.25 mg by mouth two (2) times daily (with meals). Yes Provider, Historical  
montelukast (SINGULAIR) 10 mg tablet Take 10 mg by mouth daily. Yes Provider, Historical  
bumetanide (BUMEX) 0.5 mg tablet Take 1 mg by mouth daily as needed.    Yes Provider, Historical  
isosorbide mononitrate ER (IMDUR) 30 mg tablet TAKE 1 TABLET BY MOUTH ONCE DAILY AS DIRECTED 10/19/18  Yes Monse Wang NP  
 omeprazole (PRILOSEC) 20 mg capsule Take 20 mg by mouth two (2) times a day. Yes Provider, Historical  
albuterol (PROAIR HFA) 90 mcg/actuation inhaler Take  by inhalation every four (4) hours as needed for Wheezing. Yes Provider, Historical  
clopidogrel (PLAVIX) 75 mg tab Take 75 mg by mouth daily. Yes Provider, Historical  
rivaroxaban (XARELTO) 15 mg tab tablet Take 15 mg by mouth daily (with dinner). Yes Provider, Historical  
lactobacillus rhamnosus gg 10 billion cell (CULTURELLE) 10 billion cell capsule Take 1 Cap by mouth daily. Yes Provider, Historical  
promethazine (PHENERGAN) 25 mg tablet Take 25 mg by mouth every six (6) hours as needed for Nausea. Yes Provider, Historical  
HYDROcodone-acetaminophen (NORCO) 5-325 mg per tablet Take 1 Tab by mouth every four (4) hours as needed for Pain. Yes Provider, Historical  
acetaminophen (TYLENOL) 325 mg tablet Take 650 mg by mouth every six (6) hours as needed for Pain. Yes Provider, Historical  
loperamide (IMMODIUM) 2 mg tablet Take 2 mg by mouth two (2) times a day. Yes Provider, Historical  
potassium chloride (K-DUR, KLOR-CON) 20 mEq tablet Take 20 mEq by mouth daily (with dinner). Yes Provider, Historical  
lisinopril (PRINIVIL, ZESTRIL) 5 mg tablet TAKE 1 TABLET BY MOUTH DAILY 3/16/18  Yes Aspen Foster MD  
biotin 10,000 mcg cap Take 1 Cap by mouth daily. Yes Provider, Historical  
saxagliptin (ONGLYZA) 5 mg tab tablet Take 5 mg by mouth daily. Takes at [id number]   Yes Provider, Historical  
DULoxetine (CYMBALTA) 60 mg capsule Take 60 mg by mouth daily. Yes Provider, Historical  
cholecalciferol (VITAMIN D3) 1,000 unit tablet Take 1,000 Units by mouth daily. Yes Provider, Historical  
azelastine-fluticasone (DYMISTA) 137-50 mcg/spray spry 1 Spray by Nasal route two (2) times a day.    Yes Provider, Historical  
mometasone-formoterol (DULERA) 200-5 mcg/actuation HFA inhaler Take 2 Puffs by inhalation two (2) times a day. Yes Provider, Historical  
predniSONE (DELTASONE) 10 mg tablet Take 20 mg by mouth daily. Yes Provider, Historical  
 
Current Facility-Administered Medications Medication Dose Route Frequency  methylPREDNISolone (PF) (SOLU-MEDROL) injection 60 mg  60 mg IntraVENous Q6H  
 0.9% sodium chloride infusion  75 mL/hr IntraVENous CONTINUOUS  
 arformoterol (BROVANA) neb solution 15 mcg  15 mcg Nebulization BID RT  
 budesonide (PULMICORT) 500 mcg/2 ml nebulizer suspension  500 mcg Nebulization BID RT  
 albuterol-ipratropium (DUO-NEB) 2.5 MG-0.5 MG/3 ML  3 mL Nebulization Q6H RT  
 carvedilol (COREG) tablet 6.25 mg  6.25 mg Oral BID WITH MEALS  clopidogrel (PLAVIX) tablet 75 mg  75 mg Oral DAILY  DULoxetine (CYMBALTA) capsule 60 mg  60 mg Oral DAILY  isosorbide mononitrate ER (IMDUR) tablet 30 mg  30 mg Oral DAILY  lisinopril (PRINIVIL, ZESTRIL) tablet 5 mg  5 mg Oral DAILY  montelukast (SINGULAIR) tablet 10 mg  10 mg Oral DAILY  sAXagliptin (ONGLYZA) tablet 5 mg  5 mg Oral DAILY  sodium chloride (NS) flush 5-10 mL  5-10 mL IntraVENous Q8H  
 sodium chloride (NS) flush 5-10 mL  5-10 mL IntraVENous PRN  
 acetaminophen (TYLENOL) tablet 650 mg  650 mg Oral Q4H PRN  
 oxyCODONE-acetaminophen (PERCOCET) 5-325 mg per tablet 1 Tab  1 Tab Oral Q4H PRN  
 HYDROmorphone (PF) (DILAUDID) injection 0.5 mg  0.5 mg IntraVENous Q4H PRN  prochlorperazine (COMPAZINE) injection 10 mg  10 mg IntraVENous Q6H PRN  
 zolpidem (AMBIEN) tablet 5 mg  5 mg Oral QHS PRN  
 insulin lispro (HUMALOG) injection   SubCUTAneous QID WITH MEALS  glucose chewable tablet 16 g  4 Tab Oral PRN  
 dextrose (D50W) injection syrg 12.5-25 g  25-50 mL IntraVENous PRN  
 glucagon (GLUCAGEN) injection 1 mg  1 mg IntraMUSCular PRN  pantoprazole (PROTONIX) tablet 40 mg  40 mg Oral ACB&D Current Outpatient Medications Medication Sig  
  tiotropium (SPIRIVA WITH HANDIHALER) 18 mcg inhalation capsule Take 1 Cap by inhalation daily.  albuterol (PROVENTIL VENTOLIN) 2.5 mg /3 mL (0.083 %) nebulizer solution 2.5 mg by Nebulization route two (2) times a day.  carvedilol (COREG) 12.5 mg tablet Take 6.25 mg by mouth two (2) times daily (with meals).  montelukast (SINGULAIR) 10 mg tablet Take 10 mg by mouth daily.  bumetanide (BUMEX) 0.5 mg tablet Take 1 mg by mouth daily as needed.  isosorbide mononitrate ER (IMDUR) 30 mg tablet TAKE 1 TABLET BY MOUTH ONCE DAILY AS DIRECTED  omeprazole (PRILOSEC) 20 mg capsule Take 20 mg by mouth two (2) times a day.  albuterol (PROAIR HFA) 90 mcg/actuation inhaler Take  by inhalation every four (4) hours as needed for Wheezing.  clopidogrel (PLAVIX) 75 mg tab Take 75 mg by mouth daily.  rivaroxaban (XARELTO) 15 mg tab tablet Take 15 mg by mouth daily (with dinner).  lactobacillus rhamnosus gg 10 billion cell (CULTURELLE) 10 billion cell capsule Take 1 Cap by mouth daily.  promethazine (PHENERGAN) 25 mg tablet Take 25 mg by mouth every six (6) hours as needed for Nausea.  HYDROcodone-acetaminophen (NORCO) 5-325 mg per tablet Take 1 Tab by mouth every four (4) hours as needed for Pain.  acetaminophen (TYLENOL) 325 mg tablet Take 650 mg by mouth every six (6) hours as needed for Pain.  loperamide (IMMODIUM) 2 mg tablet Take 2 mg by mouth two (2) times a day.  potassium chloride (K-DUR, KLOR-CON) 20 mEq tablet Take 20 mEq by mouth daily (with dinner).  lisinopril (PRINIVIL, ZESTRIL) 5 mg tablet TAKE 1 TABLET BY MOUTH DAILY  biotin 10,000 mcg cap Take 1 Cap by mouth daily.  saxagliptin (ONGLYZA) 5 mg tab tablet Take 5 mg by mouth daily. Takes at 36  DULoxetine (CYMBALTA) 60 mg capsule Take 60 mg by mouth daily.  cholecalciferol (VITAMIN D3) 1,000 unit tablet Take 1,000 Units by mouth daily.   
 azelastine-fluticasone (DYMISTA) 137-50 mcg/spray spry 1 Spray by Nasal route two (2) times a day.  mometasone-formoterol (DULERA) 200-5 mcg/actuation HFA inhaler Take 2 Puffs by inhalation two (2) times a day.  predniSONE (DELTASONE) 10 mg tablet Take 20 mg by mouth daily. Allergies Allergen Reactions  Advair Diskus [Fluticasone-Salmeterol] Rash  Aspartame Other (comments)  Ciprofloxacin Rash  Statins-Hmg-Coa Reductase Inhibitors Other (comments) Muscle pain Lipitor/crestor/zocor  Sulfa (Sulfonamide Antibiotics) Rash  Tetracycline Other (comments) musclepain  Zetia [Ezetimibe] Myalgia Review of Systems:  Pertinent items are noted in HPI. Objective:  
 
Patient Vitals for the past 8 hrs: 
 BP Temp Pulse Resp SpO2  
18 1232 (!) 155/101 98.1 °F (36.7 °C) 87 20 97 % 18 1100 141/88  96 13 95 % 18 1000 (!) 157/100  93 18 96 % 18 0835     96 % 18 0800 (!) 162/97 98.3 °F (36.8 °C) 97 20 97 % Temp (24hrs), Av.9 °F (36.6 °C), Min:97.6 °F (36.4 °C), Max:98.3 °F (36.8 °C) EXAM: GEN: Obese female laying in bed in NAD 
PSYCH: AAO X 3 
MUSC/NEURO: Pain to palpation of the mid thoracic spine. There is no step off. There are no lesions. +DF +PF +EHL + EFL bilaterally with 5/5 strength throughout. S/LT is intact. +DP and PT distally. IMAGIN. T7 superior endplate compression deformity. Data Review Recent Results (from the past 24 hour(s)) CBC WITH AUTOMATED DIFF Collection Time: 18  3:26 PM  
Result Value Ref Range WBC 13.9 (H) 3.6 - 11.0 K/uL  
 RBC 4.49 3.80 - 5.20 M/uL  
 HGB 13.0 11.5 - 16.0 g/dL HCT 42.5 35.0 - 47.0 % MCV 94.7 80.0 - 99.0 FL  
 MCH 29.0 26.0 - 34.0 PG  
 MCHC 30.6 30.0 - 36.5 g/dL  
 RDW 15.7 (H) 11.5 - 14.5 % PLATELET 509 982 - 760 K/uL MPV 9.9 8.9 - 12.9 FL  
 NRBC 0.0 0  WBC ABSOLUTE NRBC 0.00 0.00 - 0.01 K/uL NEUTROPHILS 84 (H) 32 - 75 % BAND NEUTROPHILS 4 0 - 6 % LYMPHOCYTES 8 (L) 12 - 49 % MONOCYTES 4 (L) 5 - 13 % EOSINOPHILS 0 0 - 7 % BASOPHILS 0 0 - 1 % IMMATURE GRANULOCYTES 0 %  
 ABS. NEUTROPHILS 12.2 (H) 1.8 - 8.0 K/UL  
 ABS. LYMPHOCYTES 1.1 0.8 - 3.5 K/UL  
 ABS. MONOCYTES 0.6 0.0 - 1.0 K/UL  
 ABS. EOSINOPHILS 0.0 0.0 - 0.4 K/UL  
 ABS. BASOPHILS 0.0 0.0 - 0.1 K/UL  
 ABS. IMM. GRANS. 0.0 K/UL  
 DF MANUAL    
 RBC COMMENTS NORMOCYTIC, NORMOCHROMIC    
CK W/ CKMB & INDEX Collection Time: 11/13/18  3:26 PM  
Result Value Ref Range CK 25 (L) 26 - 192 U/L  
 CK - MB 1.1 <3.6 NG/ML  
 CK-MB Index 4.4 (H) 0 - 2.5 METABOLIC PANEL, COMPREHENSIVE Collection Time: 11/13/18  3:26 PM  
Result Value Ref Range Sodium 138 136 - 145 mmol/L Potassium 4.6 3.5 - 5.1 mmol/L Chloride 101 97 - 108 mmol/L  
 CO2 28 21 - 32 mmol/L Anion gap 9 5 - 15 mmol/L Glucose 350 (H) 65 - 100 mg/dL BUN 36 (H) 6 - 20 MG/DL Creatinine 1.75 (H) 0.55 - 1.02 MG/DL  
 BUN/Creatinine ratio 21 (H) 12 - 20 GFR est AA 35 (L) >60 ml/min/1.73m2 GFR est non-AA 29 (L) >60 ml/min/1.73m2 Calcium 8.5 8.5 - 10.1 MG/DL Bilirubin, total 0.6 0.2 - 1.0 MG/DL  
 ALT (SGPT) 72 12 - 78 U/L  
 AST (SGOT) 24 15 - 37 U/L Alk. phosphatase 167 (H) 45 - 117 U/L Protein, total 6.7 6.4 - 8.2 g/dL Albumin 3.1 (L) 3.5 - 5.0 g/dL Globulin 3.6 2.0 - 4.0 g/dL A-G Ratio 0.9 (L) 1.1 - 2.2 MAGNESIUM Collection Time: 11/13/18  3:26 PM  
Result Value Ref Range Magnesium 2.0 1.6 - 2.4 mg/dL NT-PRO BNP Collection Time: 11/13/18  3:26 PM  
Result Value Ref Range NT pro- (H) 0 - 125 PG/ML  
TROPONIN I Collection Time: 11/13/18  3:26 PM  
Result Value Ref Range Troponin-I, Qt. <0.05 <0.05 ng/mL TSH 3RD GENERATION Collection Time: 11/13/18  3:26 PM  
Result Value Ref Range TSH 0.58 0.36 - 3.74 uIU/mL HEMOGLOBIN A1C WITH EAG Collection Time: 11/13/18  3:26 PM  
Result Value Ref Range Hemoglobin A1c 8.5 (H) 4.2 - 6.3 % Est. average glucose 197 mg/dL EKG, 12 LEAD, INITIAL Collection Time: 11/13/18  4:09 PM  
Result Value Ref Range Ventricular Rate 109 BPM  
 Atrial Rate 109 BPM  
 P-R Interval 128 ms QRS Duration 76 ms  
 Q-T Interval 344 ms QTC Calculation (Bezet) 463 ms Calculated P Axis 64 degrees Calculated R Axis -66 degrees Calculated T Axis 50 degrees Diagnosis Sinus tachycardia Right atrial enlargement Pulmonary disease pattern Left anterior fascicular block Abnormal ECG When compared with ECG of 27-JUN-2018 10:35, No significant change was found GLUCOSE, POC Collection Time: 11/13/18  6:42 PM  
Result Value Ref Range Glucose (POC) 299 (H) 65 - 100 mg/dL Performed by Carmen Sport RESPIRATORY PANEL,PCR,NASOPHARYNGEAL Collection Time: 11/13/18  7:03 PM  
Result Value Ref Range Adenovirus NOT DETECTED NOTD Coronavirus 229E NOT DETECTED NOTD Coronavirus HKU1 NOT DETECTED NOTD Coronavirus CVNL63 NOT DETECTED NOTD Coronavirus OC43 NOT DETECTED NOTD Metapneumovirus NOT DETECTED NOTD Rhinovirus and Enterovirus NOT DETECTED NOTD Influenza A NOT DETECTED NOTD Influenza A, subtype H1 NOT DETECTED NOTD Influenza A, subtype H3 NOT DETECTED NOTD INFLUENZA A H1N1 PCR NOT DETECTED NOTD Influenza B NOT DETECTED NOTD Parainfluenza 1 NOT DETECTED NOTD Parainfluenza 2 NOT DETECTED NOTD Parainfluenza 3 NOT DETECTED NOTD Parainfluenza virus 4 NOT DETECTED NOTD    
 RSV by PCR NOT DETECTED NOTD Bordetella pertussis - PCR NOT DETECTED NOTD Chlamydophila pneumoniae DNA, QL, PCR NOT DETECTED NOTD Mycoplasma pneumoniae DNA, QL, PCR NOT DETECTED NOTD URINALYSIS W/MICROSCOPIC Collection Time: 11/13/18  7:58 PM  
Result Value Ref Range Color YELLOW/STRAW Appearance CLOUDY (A) CLEAR Specific gravity 1.025 1.003 - 1.030    
 pH (UA) 5.0 5.0 - 8.0 Protein TRACE (A) NEG mg/dL Glucose >1,000 (A) NEG mg/dL Ketone NEGATIVE  NEG mg/dL Bilirubin NEGATIVE  NEG Blood TRACE (A) NEG Urobilinogen 0.2 0.2 - 1.0 EU/dL Nitrites NEGATIVE  NEG Leukocyte Esterase MODERATE (A) NEG    
 WBC  0 - 4 /hpf  
 RBC 0-5 0 - 5 /hpf Epithelial cells MODERATE (A) FEW /lpf Bacteria 4+ (A) NEG /hpf URINE CULTURE HOLD SAMPLE Collection Time: 11/13/18  7:58 PM  
Result Value Ref Range Urine culture hold URINE ON HOLD IN MICROBIOLOGY DEPT FOR 3 DAYS. IF UNPRESERVED URINE IS SUBMITTED, IT CANNOT BE USED FOR ADDITIONAL TESTING AFTER 24 HRS, RECOLLECTION WILL BE REQUIRED. GLUCOSE, POC Collection Time: 11/13/18  9:28 PM  
Result Value Ref Range Glucose (POC) 304 (H) 65 - 100 mg/dL Performed by Abbe Samuels GLUCOSE, POC Collection Time: 11/14/18  7:51 AM  
Result Value Ref Range Glucose (POC) 278 (H) 65 - 100 mg/dL Performed by Lyn Mckay, POC Collection Time: 11/14/18 12:29 PM  
Result Value Ref Range Glucose (POC) 278 (H) 65 - 100 mg/dL Performed by Andressa Castellano Assessment/Plan:  
A: 1. T7 compression deformity P: 1. Compression fracture:  I agree this is likely a source of her pain and difficulty to improve with the steroids. I am obtaining a MRI of the t and l spine. If acute patient is a candidate for kyphoplasty. Plan for kyphoplasty tomorrow after MRI complete. Discussed case with Dr. Nadiya Nunes who agrees with plan. SALIMA [facility]  [facility]

## 2018-11-14 NOTE — PROGRESS NOTES
Primary Nurse Shala Jackman and Mandy Guzmán RN performed a dual skin assessment on this patient Impairment noted- see wound doc flow sheet - Bilateral lower extremity edema and hyperpigmentations - Scattered ecchymosis on legs and arms 
      - Skin tears on arms and legs - Scars on lower legs Darrion score is 20 Bedside and Verbal shift change report given to Zak Brewer RN (oncoming nurse) by Chris Ness RN (offgoing nurse). Report included the following information SBAR, Kardex, Intake/Output, MAR and Recent Results.

## 2018-11-15 LAB
ALBUMIN SERPL-MCNC: 2.7 G/DL (ref 3.5–5)
ALBUMIN/GLOB SERPL: 0.8 {RATIO} (ref 1.1–2.2)
ALP SERPL-CCNC: 133 U/L (ref 45–117)
ALT SERPL-CCNC: 53 U/L (ref 12–78)
ANION GAP SERPL CALC-SCNC: 8 MMOL/L (ref 5–15)
AST SERPL-CCNC: 15 U/L (ref 15–37)
BASOPHILS # BLD: 0 K/UL (ref 0–0.1)
BASOPHILS NFR BLD: 0 % (ref 0–1)
BILIRUB SERPL-MCNC: 0.5 MG/DL (ref 0.2–1)
BUN SERPL-MCNC: 30 MG/DL (ref 6–20)
BUN/CREAT SERPL: 27 (ref 12–20)
CALCIUM SERPL-MCNC: 8.1 MG/DL (ref 8.5–10.1)
CHLORIDE SERPL-SCNC: 104 MMOL/L (ref 97–108)
CO2 SERPL-SCNC: 26 MMOL/L (ref 21–32)
CREAT SERPL-MCNC: 1.11 MG/DL (ref 0.55–1.02)
DIFFERENTIAL METHOD BLD: ABNORMAL
EOSINOPHIL # BLD: 0 K/UL (ref 0–0.4)
EOSINOPHIL NFR BLD: 0 % (ref 0–7)
ERYTHROCYTE [DISTWIDTH] IN BLOOD BY AUTOMATED COUNT: 15.3 % (ref 11.5–14.5)
GLOBULIN SER CALC-MCNC: 3.2 G/DL (ref 2–4)
GLUCOSE BLD STRIP.AUTO-MCNC: 282 MG/DL (ref 65–100)
GLUCOSE BLD STRIP.AUTO-MCNC: 294 MG/DL (ref 65–100)
GLUCOSE BLD STRIP.AUTO-MCNC: 315 MG/DL (ref 65–100)
GLUCOSE BLD STRIP.AUTO-MCNC: 352 MG/DL (ref 65–100)
GLUCOSE SERPL-MCNC: 347 MG/DL (ref 65–100)
HCT VFR BLD AUTO: 39 % (ref 35–47)
HGB BLD-MCNC: 11.9 G/DL (ref 11.5–16)
IMM GRANULOCYTES # BLD: 0.2 K/UL (ref 0–0.04)
IMM GRANULOCYTES NFR BLD AUTO: 2 % (ref 0–0.5)
LYMPHOCYTES # BLD: 0.4 K/UL (ref 0.8–3.5)
LYMPHOCYTES NFR BLD: 4 % (ref 12–49)
MAGNESIUM SERPL-MCNC: 2.1 MG/DL (ref 1.6–2.4)
MCH RBC QN AUTO: 28.3 PG (ref 26–34)
MCHC RBC AUTO-ENTMCNC: 30.5 G/DL (ref 30–36.5)
MCV RBC AUTO: 92.9 FL (ref 80–99)
MONOCYTES # BLD: 0.4 K/UL (ref 0–1)
MONOCYTES NFR BLD: 4 % (ref 5–13)
NEUTS SEG # BLD: 8.5 K/UL (ref 1.8–8)
NEUTS SEG NFR BLD: 90 % (ref 32–75)
NRBC # BLD: 0 K/UL (ref 0–0.01)
NRBC BLD-RTO: 0 PER 100 WBC
PLATELET # BLD AUTO: 202 K/UL (ref 150–400)
PMV BLD AUTO: 9.8 FL (ref 8.9–12.9)
POTASSIUM SERPL-SCNC: 4.4 MMOL/L (ref 3.5–5.1)
PROT SERPL-MCNC: 5.9 G/DL (ref 6.4–8.2)
RBC # BLD AUTO: 4.2 M/UL (ref 3.8–5.2)
SERVICE CMNT-IMP: ABNORMAL
SODIUM SERPL-SCNC: 138 MMOL/L (ref 136–145)
WBC # BLD AUTO: 9.4 K/UL (ref 3.6–11)

## 2018-11-15 PROCEDURE — 36415 COLL VENOUS BLD VENIPUNCTURE: CPT

## 2018-11-15 PROCEDURE — 94760 N-INVAS EAR/PLS OXIMETRY 1: CPT

## 2018-11-15 PROCEDURE — 74011000250 HC RX REV CODE- 250: Performed by: INTERNAL MEDICINE

## 2018-11-15 PROCEDURE — 77010033678 HC OXYGEN DAILY

## 2018-11-15 PROCEDURE — 74011636637 HC RX REV CODE- 636/637: Performed by: FAMILY MEDICINE

## 2018-11-15 PROCEDURE — 77030038269 HC DRN EXT URIN PURWCK BARD -A

## 2018-11-15 PROCEDURE — 82962 GLUCOSE BLOOD TEST: CPT

## 2018-11-15 PROCEDURE — 74011250636 HC RX REV CODE- 250/636: Performed by: PHYSICIAN ASSISTANT

## 2018-11-15 PROCEDURE — 94640 AIRWAY INHALATION TREATMENT: CPT

## 2018-11-15 PROCEDURE — 99218 HC RM OBSERVATION: CPT

## 2018-11-15 PROCEDURE — 83735 ASSAY OF MAGNESIUM: CPT

## 2018-11-15 PROCEDURE — 74011250637 HC RX REV CODE- 250/637: Performed by: INTERNAL MEDICINE

## 2018-11-15 PROCEDURE — 80053 COMPREHEN METABOLIC PANEL: CPT

## 2018-11-15 PROCEDURE — 65660000000 HC RM CCU STEPDOWN

## 2018-11-15 PROCEDURE — 85025 COMPLETE CBC W/AUTO DIFF WBC: CPT

## 2018-11-15 RX ADMIN — CLOPIDOGREL BISULFATE 75 MG: 75 TABLET ORAL at 09:10

## 2018-11-15 RX ADMIN — Medication 10 ML: at 07:20

## 2018-11-15 RX ADMIN — ISOSORBIDE MONONITRATE 30 MG: 30 TABLET, EXTENDED RELEASE ORAL at 09:10

## 2018-11-15 RX ADMIN — BUDESONIDE 500 MCG: 0.5 INHALANT RESPIRATORY (INHALATION) at 08:30

## 2018-11-15 RX ADMIN — IPRATROPIUM BROMIDE AND ALBUTEROL SULFATE 3 ML: .5; 3 SOLUTION RESPIRATORY (INHALATION) at 20:10

## 2018-11-15 RX ADMIN — Medication 10 ML: at 22:00

## 2018-11-15 RX ADMIN — PANTOPRAZOLE SODIUM 40 MG: 40 TABLET, DELAYED RELEASE ORAL at 06:00

## 2018-11-15 RX ADMIN — METHYLPREDNISOLONE SODIUM SUCCINATE 60 MG: 40 INJECTION, POWDER, FOR SOLUTION INTRAMUSCULAR; INTRAVENOUS at 07:19

## 2018-11-15 RX ADMIN — MONTELUKAST SODIUM 10 MG: 10 TABLET, COATED ORAL at 09:10

## 2018-11-15 RX ADMIN — METHYLPREDNISOLONE SODIUM SUCCINATE 40 MG: 40 INJECTION, POWDER, FOR SOLUTION INTRAMUSCULAR; INTRAVENOUS at 22:50

## 2018-11-15 RX ADMIN — INSULIN LISPRO 10 UNITS: 100 INJECTION, SOLUTION INTRAVENOUS; SUBCUTANEOUS at 09:10

## 2018-11-15 RX ADMIN — IPRATROPIUM BROMIDE AND ALBUTEROL SULFATE 3 ML: .5; 3 SOLUTION RESPIRATORY (INHALATION) at 08:30

## 2018-11-15 RX ADMIN — PANTOPRAZOLE SODIUM 40 MG: 40 TABLET, DELAYED RELEASE ORAL at 16:56

## 2018-11-15 RX ADMIN — DULOXETINE 60 MG: 30 CAPSULE, DELAYED RELEASE ORAL at 09:10

## 2018-11-15 RX ADMIN — OXYCODONE AND ACETAMINOPHEN 1 TABLET: 5; 325 TABLET ORAL at 20:17

## 2018-11-15 RX ADMIN — INSULIN LISPRO 4 UNITS: 100 INJECTION, SOLUTION INTRAVENOUS; SUBCUTANEOUS at 22:00

## 2018-11-15 RX ADMIN — INSULIN LISPRO 7 UNITS: 100 INJECTION, SOLUTION INTRAVENOUS; SUBCUTANEOUS at 16:56

## 2018-11-15 RX ADMIN — LISINOPRIL 5 MG: 5 TABLET ORAL at 09:10

## 2018-11-15 RX ADMIN — BUDESONIDE 500 MCG: 0.5 INHALANT RESPIRATORY (INHALATION) at 20:10

## 2018-11-15 RX ADMIN — SAXAGLIPTIN 5 MG: 5 TABLET, FILM COATED ORAL at 09:18

## 2018-11-15 RX ADMIN — OXYCODONE AND ACETAMINOPHEN 1 TABLET: 5; 325 TABLET ORAL at 17:01

## 2018-11-15 RX ADMIN — CARVEDILOL 6.25 MG: 6.25 TABLET, FILM COATED ORAL at 16:56

## 2018-11-15 RX ADMIN — CARVEDILOL 6.25 MG: 6.25 TABLET, FILM COATED ORAL at 09:10

## 2018-11-15 RX ADMIN — SODIUM CHLORIDE 75 ML/HR: 900 INJECTION, SOLUTION INTRAVENOUS at 11:41

## 2018-11-15 RX ADMIN — METHYLPREDNISOLONE SODIUM SUCCINATE 40 MG: 40 INJECTION, POWDER, FOR SOLUTION INTRAMUSCULAR; INTRAVENOUS at 13:40

## 2018-11-15 RX ADMIN — IPRATROPIUM BROMIDE AND ALBUTEROL SULFATE 3 ML: .5; 3 SOLUTION RESPIRATORY (INHALATION) at 13:49

## 2018-11-15 RX ADMIN — OXYCODONE AND ACETAMINOPHEN 1 TABLET: 5; 325 TABLET ORAL at 09:18

## 2018-11-15 RX ADMIN — INSULIN LISPRO 12 UNITS: 100 INJECTION, SOLUTION INTRAVENOUS; SUBCUTANEOUS at 11:36

## 2018-11-15 RX ADMIN — IPRATROPIUM BROMIDE AND ALBUTEROL SULFATE 3 ML: .5; 3 SOLUTION RESPIRATORY (INHALATION) at 04:14

## 2018-11-15 RX ADMIN — Medication 10 ML: at 13:40

## 2018-11-15 RX ADMIN — SODIUM CHLORIDE 75 ML/HR: 900 INJECTION, SOLUTION INTRAVENOUS at 23:55

## 2018-11-15 NOTE — PROGRESS NOTES
Daily Progress Note: 11/15/2018 [facility] Diana Collins MD 
 
Assessment/Plan: Dyspnea (11/13/2018) - workup with Pul and Card. - likely some degree of COPD and Pul hypertension and due to supra-morbid obesity and                 some due to hypoventilation due to back pain - Pulmonary following and added IV steroids trial; V/Q scan with low prob for PE 
            - consulted Orthopedics - may benefit from Kyphoplasty  
  
  Type II diabetes mellitus ([facility] (10/9/2015) - with reported nephropathy with proteinuria without CKD 
            - continue saxagliptin and follow on SSI 
  
  Coronary artery disease involving native coronary artery without angina pectoris (1/22/2016) - stable, continue cardiac meds 
  
  Essential hypertension (1/22/2016) - cont home meds - adjust as needed 
  
  Chronic anticoagulation (3/30/2018) - hold Xarelto for possible kyphoplasty 
  
  Obesity, BMI 30-39. 9(HCC) (10/1/2018) - counseled on weight loss, this is contributing to her current symptoms 
            - looks very Pickwickian  
  
  ARF (acute renal failure) ([facility] (11/13/2018) - creatinine up acutely from prior, possibly due to diuresis 
            - hold diuretic  
  
  Closed wedge compression fracture of T7 vertebra ([facility] (11/13/2018) - consulted Orthopedics, hopefully she will be a candidate for kyphoplasty Code status: 
            - patient is FULL CODE, no AMD on file, her daughter Maylin Briseno is her surrogate 
   
 
Problem List: 
Problem List as of 11/15/2018 Date Reviewed: 11/13/2018 Codes Class Noted - Resolved * (Principal) Dyspnea ICD-10-CM: R06.00 
ICD-9-CM: 786.09  11/13/2018 - Present ARF (acute renal failure) (HCC) ICD-10-CM: N17.9 ICD-9-CM: 584.9  11/13/2018 - Present Obesity (BMI 30-39.9) (Chronic) ICD-10-CM: V38.0 ICD-9-CM: 278.00  11/13/2018 - Present Closed wedge compression fracture of T7 vertebra ([facility] ICD-10-CM: T38.133K ICD-9-CM: 805.2  11/13/2018 - Present Type 2 diabetes with nephropathy ([facility] ICD-10-CM: E11.21 
ICD-9-CM: 250.40, 583.81  10/1/2018 - Present Chest pain ICD-10-CM: R07.9 ICD-9-CM: 786.50  6/27/2018 - Present Generalized abdominal pain ICD-10-CM: R10.84 ICD-9-CM: 789.07  6/27/2018 - Present Recurrent deep vein thrombosis (DVT) (HCC) ICD-10-CM: I82.409 ICD-9-CM: 453.40  3/30/2018 - Present Chronic anticoagulation (Chronic) ICD-10-CM: Z79.01 
ICD-9-CM: V58.61  3/30/2018 - Present Coronary artery disease involving native coronary artery without angina pectoris (Chronic) ICD-10-CM: I25.10 ICD-9-CM: 414.01  1/22/2016 - Present Essential hypertension (Chronic) ICD-10-CM: I10 
ICD-9-CM: 401.9  1/22/2016 - Present Type II diabetes mellitus (HCC) (Chronic) ICD-10-CM: E11.9 ICD-9-CM: 250.00  10/9/2015 - Present NSTEMI (non-ST elevated myocardial infarction) ([facility] ICD-10-CM: I21.4 ICD-9-CM: 410.70  10/9/2015 - Present RESOLVED: CAD (coronary artery disease) ICD-10-CM: I25.10 ICD-9-CM: 414.00  10/9/2015 - 1/22/2016 RESOLVED: HTN (hypertension) ICD-10-CM: I10 
ICD-9-CM: 401.9  10/9/2015 - 1/22/2016 Subjective:  
  81 y.o.  female who is admitted with SOB. Ms. Alyson Keene presented to the Emergency Department this PM complaining of SOB: for the past 3 months, worked up by Pulmonary and Cardiology as an outpatient without any definite diagnosis, not hypoxic, associated with back pain, unable to take a deep breath without pain, back pain started at the same time as the SOB. History obtained from chart review and the patient. Previous records reviewed, recent admit for chest pain. (Dr Meehan). 11/14:  Feels much better this AM with less back pain on meds.   Still very tender over site with any palpation. Less air hunger but still \"not breathing back to normal yet. \"  Glucoses up with the steroids. Pul and Card seeing also. 11/15:    Reports she continues to feel better and reports no longer SOB. She is not moving much due to back pain. No chest pains. Ortho to decide next step for compression fx. No complaints of weakness or numbness LEs.   
Review of Systems: A comprehensive review of systems was negative except for that written in the HPI. Objective:  
Physical Exam:  
 
Visit Vitals /88 (BP 1 Location: Left arm, BP Patient Position: At rest) Pulse 98 Temp 98.2 °F (36.8 °C) Resp 17 Ht 5' 7\" (1.702 m) Wt 110.2 kg (243 lb) SpO2 98% Breastfeeding? No  
BMI 38.06 kg/m² O2 Flow Rate (L/min): 2 l/min O2 Device: Nasal cannula Temp (24hrs), Av.1 °F (36.7 °C), Min:97.7 °F (36.5 °C), Max:98.3 °F (36.8 °C) 
  1901 - 11/15 0700 In: 180 [P.O.:180] Out: 300 [Urine:300]   701 -  1900 In: 392.5 [I.V.:392.5] Out: 800 [Urine:800] General:  Alert, cooperative, obese, no distress, appears stated age. Head:  Normocephalic, without obvious abnormality, atraumatic. Eyes:  Conjunctivae/corneas clear. PERRL, EOMs intact. Throat: Lips, mucosa, and tongue moist.. Neck: Supple, symmetrical, trachea midline, no adenopathy, thyroid: no enlargement/tenderness/nodules, no carotid bruit and no JVD. Back:   Symmetric,with marked tenderness to palpation over T6-7-8 area. Lungs:   Diminished BS bilaterally but otherwise clear at this time. Chest wall:  No tenderness or deformity. Heart:  Regular rate and rhythm, S1, S2 normal, no murmur, click, rub or gallop. Abdomen:   Soft, non-tender. Bowel sounds normal. No masses,  No organomegaly. Extremities: no cyanosis. Trace LE edema. No calf tenderness or cords. Skin:  turgor normal.   
Neurologic: CNII-XII intact. Alert and oriented X 3.   Fine motor of hands and fingers normal.   equal.  No cogwheeling or rigidity. Gait not tested at this time. Sensation grossly normal to touch. Gross motor of extremities normal.    
 
Data Review:  
  CT of Chest 11/13/18 FINDINGS: 
THYROID: No nodule. MEDIASTINUM: No mass or lymphadenopathy. SONJA: No mass or lymphadenopathy. THORACIC AORTA: No aneurysm. Mild vascular calcifications MAIN PULMONARY ARTERY: Normal in caliber. TRACHEA/BRONCHI: Patent. ESOPHAGUS: No wall thickening or dilatation. Small hiatal hernia HEART: Normal in size. PLEURA: No effusion or pneumothorax. LUNGS: No nodule, mass, or airspace disease. Bilateral atelectatic changes. INCIDENTALLY IMAGED UPPER ABDOMEN: No focal abnormality. BONES: Bones are osteopenic. There is collapse of the superior endplate of T7. This has occurred since June 2018 Possible milk of calcium cyst within the right breast 
IMPRESSION: 
1. No acute cardiopulmonary disease 2. Interval collapse superior endplate of T7. The patient has back pain this may 
be acute to subacute and MR imaging would better characterize 
  
Recent Days: 
Recent Labs 11/13/18 
1526 WBC 13.9* HGB 13.0 HCT 42.5  Recent Labs 11/15/18 
0524 11/14/18 
1734 11/13/18 
1526  139 138  
K 4.4 4.1 4.6  106 101 CO2 26 26 28 * 320* 350* BUN 30* 27* 36* CREA 1.11* 0.86 1.75* CA 8.1* 7.4* 8.5 MG 2.1  --  2.0 ALB 2.7*  --  3.1* TBILI 0.5  --  0.6 SGOT 15  --  24 ALT 53  --  72 No results for input(s): PH, PCO2, PO2, HCO3, FIO2 in the last 72 hours. 24 Hour Results: 
Recent Results (from the past 24 hour(s)) GLUCOSE, POC Collection Time: 11/14/18  7:51 AM  
Result Value Ref Range Glucose (POC) 278 (H) 65 - 100 mg/dL Performed by Lieutenant Frias, POC Collection Time: 11/14/18 12:29 PM  
Result Value Ref Range Glucose (POC) 278 (H) 65 - 100 mg/dL Performed by [facility] METABOLIC PANEL, BASIC  
 Collection Time: 11/14/18  5:34 PM  
Result Value Ref Range Sodium 139 136 - 145 mmol/L Potassium 4.1 3.5 - 5.1 mmol/L Chloride 106 97 - 108 mmol/L  
 CO2 26 21 - 32 mmol/L Anion gap 7 5 - 15 mmol/L Glucose 320 (H) 65 - 100 mg/dL BUN 27 (H) 6 - 20 MG/DL Creatinine 0.86 0.55 - 1.02 MG/DL  
 BUN/Creatinine ratio 31 (H) 12 - 20 GFR est AA >60 >60 ml/min/1.73m2 GFR est non-AA >60 >60 ml/min/1.73m2 Calcium 7.4 (L) 8.5 - 10.1 MG/DL  
GLUCOSE, POC Collection Time: 11/14/18  5:39 PM  
Result Value Ref Range Glucose (POC) 330 (H) 65 - 100 mg/dL Performed by Washington Choe, POC Collection Time: 11/14/18  9:22 PM  
Result Value Ref Range Glucose (POC) 359 (H) 65 - 100 mg/dL Performed by Shaun Montes (PCT) METABOLIC PANEL, COMPREHENSIVE Collection Time: 11/15/18  5:24 AM  
Result Value Ref Range Sodium 138 136 - 145 mmol/L Potassium 4.4 3.5 - 5.1 mmol/L Chloride 104 97 - 108 mmol/L  
 CO2 26 21 - 32 mmol/L Anion gap 8 5 - 15 mmol/L Glucose 347 (H) 65 - 100 mg/dL BUN 30 (H) 6 - 20 MG/DL Creatinine 1.11 (H) 0.55 - 1.02 MG/DL  
 BUN/Creatinine ratio 27 (H) 12 - 20 GFR est AA 59 (L) >60 ml/min/1.73m2 GFR est non-AA 49 (L) >60 ml/min/1.73m2 Calcium 8.1 (L) 8.5 - 10.1 MG/DL Bilirubin, total 0.5 0.2 - 1.0 MG/DL  
 ALT (SGPT) 53 12 - 78 U/L  
 AST (SGOT) 15 15 - 37 U/L Alk. phosphatase 133 (H) 45 - 117 U/L Protein, total 5.9 (L) 6.4 - 8.2 g/dL Albumin 2.7 (L) 3.5 - 5.0 g/dL Globulin 3.2 2.0 - 4.0 g/dL A-G Ratio 0.8 (L) 1.1 - 2.2 MAGNESIUM Collection Time: 11/15/18  5:24 AM  
Result Value Ref Range Magnesium 2.1 1.6 - 2.4 mg/dL Medications reviewed Current Facility-Administered Medications Medication Dose Route Frequency  methylPREDNISolone (PF) (SOLU-MEDROL) injection 60 mg  60 mg IntraVENous Q6H  
 0.9% sodium chloride infusion  75 mL/hr IntraVENous CONTINUOUS  
  arformoterol (BROVANA) neb solution 15 mcg  15 mcg Nebulization BID RT  
 budesonide (PULMICORT) 500 mcg/2 ml nebulizer suspension  500 mcg Nebulization BID RT  
 albuterol-ipratropium (DUO-NEB) 2.5 MG-0.5 MG/3 ML  3 mL Nebulization Q6H RT  
 carvedilol (COREG) tablet 6.25 mg  6.25 mg Oral BID WITH MEALS  clopidogrel (PLAVIX) tablet 75 mg  75 mg Oral DAILY  DULoxetine (CYMBALTA) capsule 60 mg  60 mg Oral DAILY  isosorbide mononitrate ER (IMDUR) tablet 30 mg  30 mg Oral DAILY  lisinopril (PRINIVIL, ZESTRIL) tablet 5 mg  5 mg Oral DAILY  montelukast (SINGULAIR) tablet 10 mg  10 mg Oral DAILY  sAXagliptin (ONGLYZA) tablet 5 mg  5 mg Oral DAILY  sodium chloride (NS) flush 5-10 mL  5-10 mL IntraVENous Q8H  
 sodium chloride (NS) flush 5-10 mL  5-10 mL IntraVENous PRN  
 acetaminophen (TYLENOL) tablet 650 mg  650 mg Oral Q4H PRN  
 oxyCODONE-acetaminophen (PERCOCET) 5-325 mg per tablet 1 Tab  1 Tab Oral Q4H PRN  
 HYDROmorphone (PF) (DILAUDID) injection 0.5 mg  0.5 mg IntraVENous Q4H PRN  prochlorperazine (COMPAZINE) injection 10 mg  10 mg IntraVENous Q6H PRN  
 zolpidem (AMBIEN) tablet 5 mg  5 mg Oral QHS PRN  
 insulin lispro (HUMALOG) injection   SubCUTAneous QID WITH MEALS  glucose chewable tablet 16 g  4 Tab Oral PRN  
 dextrose (D50W) injection syrg 12.5-25 g  25-50 mL IntraVENous PRN  
 glucagon (GLUCAGEN) injection 1 mg  1 mg IntraMUSCular PRN  pantoprazole (PROTONIX) tablet 40 mg  40 mg Oral ACB&D Care Plan discussed with: Patient and Nurse Total time spent with patient: 30 minutes.  
 
Pradeep Dan MD/Christine Sanchez MD

## 2018-11-15 NOTE — ROUTINE PROCESS
Bedside shift change report given to [employer] (oncoming nurse) by Esperanza Rowell (off going nurse). Report included the following information SBAR, ED Summary, Intake/Output, MAR and Procedure Verification. MD called and stated patient should be on bedrest until seen this morning due to MRI result.

## 2018-11-15 NOTE — PROGRESS NOTES
1128: Attempted to reach MD Grace Gatica regarding pt's elevated BP. Unable to reach at this time. [place]: Spoke w/ Laura BORREGO regarding IR recommendations for holding Plavix x5d (pt will not have procedure until next week). PA stated to notify attending. Notified MD Grace Gatica who stated OK to place cardiology consult to determine if it is OK to hold Plavix for this length of time. 1915: Bedside and Verbal shift change report given to Johana Fernandez (oncoming nurse) by Ursula AJ (offgoing nurse). Report included the following information SBAR, Kardex, Intake/Output, Recent Results and Cardiac Rhythm NSR.  
1936: Spoke w/ Laura BORREGO- he stated IR will perform kyphoplasty tomorrow. Hold Plavix/Xarelto. NPO @ [place]. [place].

## 2018-11-15 NOTE — PROGRESS NOTES
Ortho Progress Note S:  PtJosiah Murray Felton comfortably in bed. C/o upper back pain. Denies radiating pain or numbness. O:  I examined pt's T spine. T7 Spinous process pain. No paraspinous process pain on exam. Strength 5/5 BLE's, DTR 2+ BLE's, +Dorsi/plantar flexion BLE's. NVI distally BLE's. Cap. Refill <2secs all toes. A:  T7 compression Fracture P:  MRI shows stable burst fracture T7. Per Dr. Blayne Payan with IR, this fx is amendable to kyphoplasty. I had a long discussion with the pt. Regarding treatment options. Compression fx is spontaneous due to Osteoporosis and chronic steroid use with COPD. Conservative tx has failed with pt. Due to severe bedridden pain making her at risk for DVT, PE, Skin breakdown, PNE etc. Pt. Elects to have kyphoplasty. Dr. Yoseph Riley discussed MRI with Dr. Rachelle Stewart and if IR feels Kypho is appropriate for her then this is the treatment plan that will get her on her feet the quickest. I don't feel pt. Would tolerate a TLSO brace with her respiratory issues. Hold Plavix (last dose this AM) and Xeralto (Last dose 11/13/18) for Kyphoplasty. Consult to Dr. Blayne Payan with IR. They will plan on length of time to hold anticoagulants and when to restart post procedure. Follow up with Dr. Yoseph Riley with [address] after DC. Dr. Rachelle Stewart agrees with plan. Thank you for allowing us to take part in this patients care. Gael Darden PA-C Orthopaedic Surgery PA

## 2018-11-15 NOTE — DIABETES MGMT
DTC Progress Note Recommendations/ Comments: If appropriate, please consider adding NPH 15 units bid while on IV steroids. Pt has required 39 units of correction over the past 24 hours. Please link the two orders and taper NPH as steroid dose is tapered. Current hospital DM medication:  
-Humalog insulin resistant correction  
-Solu-medrol 40mg every 8 hours Chart reviewed on Raya Roy. Patient is a 81 y.o. female with known history of Type 2 Diabetes on Onglyza 5mg daily at home. A1c:  
Lab Results Component Value Date/Time Hemoglobin A1c 8.5 (H) 11/13/2018 03:26 PM  
 Hemoglobin A1c 6.4 (H) 06/27/2018 10:48 AM  
 
 
Recent Glucose Results:  
Lab Results Component Value Date/Time  (H) 11/15/2018 05:24 AM  
  (H) 11/14/2018 05:34 PM  
 GLUCPOC 282 (H) 11/15/2018 03:18 PM  
 GLUCPOC 352 (H) 11/15/2018 11:11 AM  
 GLUCPOC 315 (H) 11/15/2018 07:47 AM  
  
 
Lab Results Component Value Date/Time Creatinine 1.11 (H) 11/15/2018 05:24 AM  
 
Estimated Creatinine Clearance: 60.3 mL/min (A) (based on SCr of 1.11 mg/dL (H)). Active Orders Diet DIET CARDIAC Regular; Consistent Carb 1800kcal  
  
 
PO intake:  
Patient Vitals for the past 72 hrs: 
 % Diet Eaten 11/15/18 1340 100 % 11/15/18 0911 90 % 11/14/18 1747 80 % Will continue to follow as needed. Thank you Taylor Wood RD, CDE [facility] Office: [phone] Pager: [phone]

## 2018-11-15 NOTE — PHYSICIAN ADVISORY
Letter of Status Determination:  
Recommend hospitalization status upgraded from OBSERVATION  to INPATIENT  Status Pt Name:  Dannielle Aquino MR#  
TERRENCE # [id number] / 
[id number] Payor: Shalini Freeman / Plan: [address] / Product Type: Medicare /   
ZOË#  [id number] Room and Hospital  Critical access hospital/01  @ [address] Hospitalization date  11/13/2018  3:02 PM  
Current Attending Physician  Sadiq Kim MD  
Principal diagnosis  Dyspnea, back pain Clinicals  Ms. Minnie Sandoval is a 81 y.o.  female who is admitted with SOB. Ms. Minnie Sandoval presented to the Emergency Department this PM complaining of SOB: for the past 3 months, worked up by Pulmonary and Cardiology as an outpatient without any definite diagnosis,, associated with back pain, unable to take a deep breath without pain, back pain started at the same time as the SOB, on O2 in the hospital, concern that SOB is related to T7 compression Fx, awaiting ortho eval and possible kyphoplasty, Xarelto held in anticipation of kyphoplasty, Pt continues to have O2 requirement to keep sats over 90%, ortho eval completed, awaiting MRI and possible kyphoplasty on 11/16 [place] (MCG) criteria Does  NOT apply STATUS DETERMINATION  INPATIENT The final decision of the patient's hospitalization status depends on the attending physician's judgment Additional comments Payor: Shalini Freeman / Plan: [address] / Product Type: Medicare /   
  
 
Sindhu Patel MD 
Cell: [phone] Physician Advisor

## 2018-11-15 NOTE — PROGRESS NOTES
Name: Justine Lobo: IVFXPERT  
: 1945 Admit Date: 2018 Phone: [phone]  Room: 4/ PCP: John Clark MD  MRN: [id number] Date: 11/15/2018  Code: Full Code Chart and notes reviewed. Data reviewed. I review the patient's current medications in the medical record at each encounter. I have evaluated and examined the patient. Overnight events Afebrile BP elevated Sats 98% on 2L 
RVP negative V/Q scan low probability MRI with acute T7 vertebral compression deformity with greater than 50% loss of vertebral body height. Mild cortical retropulsion at T7 with mild to moderate canal stenosis at T7. There is marrow edema along the anterior inferior endplate of T6. ROS: Back pain worse this morning which worsens her breathing. SOB better after pain meds. Denies fever, chills, or cough. Denies wheeze. Denies CP. Denies abd pain. Having spine MRI to eval for potential kyphoplasty. Reports LE edema/erythema to be at her baseline. Past Medical History:  
Diagnosis Date  Asthma  Autoimmune disease ([facility]   
 fibromyalgia  CAD (coronary artery disease) 10/9/2015  Closed wedge compression fracture of T7 vertebra ([facility] 2018  Diabetes ([facility]  DVT (deep vein thrombosis) in pregnancy ([facility]  GERD (gastroesophageal reflux disease)  HTN (hypertension)  NSTEMI (non-ST elevated myocardial infarction) ([facility] 10/9/2015  Obesity (BMI 30-39.9) 2018  Sleep apnea   
 wears CPAP Past Surgical History:  
Procedure Laterality Date  ABDOMEN SURGERY PROC UNLISTED    
 hital hernia repair, gall bladder removed  BREAST SURGERY PROCEDURE UNLISTED    
 lumpectomy  CARDIAC SURG PROCEDURE UNLIST  10/9/15  
 2 stents [place]  HX COLOSTOMY    
 and reversal, post episiotomy repair [address]  HX UROLOGICAL  2009  
 bladder sling Family History Problem Relation Age of Onset  Diabetes Mother  Heart Failure Mother  Kidney Disease Mother  Diabetes Father  Heart Disease Father  Elevated Lipids Father  Heart Failure Father  Heart Disease Brother  Cancer Brother   
     kidney  Diabetes Brother  No Known Problems Brother  No Known Problems Brother Social History Tobacco Use  Smoking status: Former Smoker Last attempt to quit: 3/30/2017 Years since quittin.6  Smokeless tobacco: Never Used Substance Use Topics  Alcohol use: No  
  Alcohol/week: 0.0 oz  
  Comment: very very rarely Allergies Allergen Reactions  Advair Diskus [Fluticasone-Salmeterol] Rash  Aspartame Other (comments)  Ciprofloxacin Rash  Statins-Hmg-Coa Reductase Inhibitors Other (comments) Muscle pain Lipitor/crestor/zocor  Sulfa (Sulfonamide Antibiotics) Rash  Tetracycline Other (comments) musclepain  Zetia [Ezetimibe] Myalgia Current Facility-Administered Medications Medication Dose Route Frequency  methylPREDNISolone (PF) (SOLU-MEDROL) injection 60 mg  60 mg IntraVENous Q6H  
 0.9% sodium chloride infusion  75 mL/hr IntraVENous CONTINUOUS  
 arformoterol (BROVANA) neb solution 15 mcg  15 mcg Nebulization BID RT  
 budesonide (PULMICORT) 500 mcg/2 ml nebulizer suspension  500 mcg Nebulization BID RT  
 albuterol-ipratropium (DUO-NEB) 2.5 MG-0.5 MG/3 ML  3 mL Nebulization Q6H RT  
 carvedilol (COREG) tablet 6.25 mg  6.25 mg Oral BID WITH MEALS  clopidogrel (PLAVIX) tablet 75 mg  75 mg Oral DAILY  DULoxetine (CYMBALTA) capsule 60 mg  60 mg Oral DAILY  isosorbide mononitrate ER (IMDUR) tablet 30 mg  30 mg Oral DAILY  lisinopril (PRINIVIL, ZESTRIL) tablet 5 mg  5 mg Oral DAILY  montelukast (SINGULAIR) tablet 10 mg  10 mg Oral DAILY  sAXagliptin (ONGLYZA) tablet 5 mg  5 mg Oral DAILY  sodium chloride (NS) flush 5-10 mL  5-10 mL IntraVENous Q8H  
  sodium chloride (NS) flush 5-10 mL  5-10 mL IntraVENous PRN  
 acetaminophen (TYLENOL) tablet 650 mg  650 mg Oral Q4H PRN  
 oxyCODONE-acetaminophen (PERCOCET) 5-325 mg per tablet 1 Tab  1 Tab Oral Q4H PRN  
 HYDROmorphone (PF) (DILAUDID) injection 0.5 mg  0.5 mg IntraVENous Q4H PRN  prochlorperazine (COMPAZINE) injection 10 mg  10 mg IntraVENous Q6H PRN  
 zolpidem (AMBIEN) tablet 5 mg  5 mg Oral QHS PRN  
 insulin lispro (HUMALOG) injection   SubCUTAneous QID WITH MEALS  glucose chewable tablet 16 g  4 Tab Oral PRN  
 dextrose (D50W) injection syrg 12.5-25 g  25-50 mL IntraVENous PRN  
 glucagon (GLUCAGEN) injection 1 mg  1 mg IntraMUSCular PRN  pantoprazole (PROTONIX) tablet 40 mg  40 mg Oral ACB&D REVIEW OF SYSTEMS  
12 point ROS negative except as stated in the HPI. Physical Exam:  
Visit Vitals BP (!) 152/98 (BP 1 Location: Left arm, BP Patient Position: At rest) Pulse (!) 103 Temp 97.9 °F (36.6 °C) Resp 19 Ht 5' 7\" (1.702 m) Wt 110.2 kg (243 lb) SpO2 98% Breastfeeding? No  
BMI 38.06 kg/m² General:  Alert, cooperative, no acute distress, appears stated age. Head:  Normocephalic, without obvious abnormality, atraumatic. Eyes:  Conjunctivae/corneas clear. Nose: Nares normal. Septum midline. Mucosa normal.   
Throat: Lips, mucosa, and tongue normal.   
Neck: Supple, symmetrical, trachea midline, no adenopathy. Lungs:   Diminished but mostly clear. No wheeze or rales. Chest wall:  No tenderness or deformity. Heart:  Regular rate and rhythm, S1, S2 normal, no murmur, click, rub or gallop. Abdomen:   Obese, soft, non-tender. Bowel sounds normal. No masses,  No organomegaly. Extremities: Extremities normal, atraumatic, no cyanosis or edema. Pulses: 2+ and symmetric all extremities. Skin: Skin color, texture, turgor normal. No rashes or lesions Lymph nodes: Cervical, supraclavicular nodes normal.  
Neurologic: Grossly nonfocal  
 
 
 Lab Results Component Value Date/Time Sodium 138 11/15/2018 05:24 AM  
 Potassium 4.4 11/15/2018 05:24 AM  
 Chloride 104 11/15/2018 05:24 AM  
 CO2 26 11/15/2018 05:24 AM  
 BUN 30 (H) 11/15/2018 05:24 AM  
 Creatinine 1.11 (H) 11/15/2018 05:24 AM  
 Glucose 347 (H) 11/15/2018 05:24 AM  
 Calcium 8.1 (L) 11/15/2018 05:24 AM  
 Magnesium 2.1 11/15/2018 05:24 AM  
 Lactic acid 1.4 06/28/2018 04:22 AM  
 
 
Lab Results Component Value Date/Time WBC 9.4 11/15/2018 05:24 AM  
 HGB 11.9 11/15/2018 05:24 AM  
 PLATELET 680 54/36/9273 05:24 AM  
 MCV 92.9 11/15/2018 05:24 AM  
 
 
Lab Results Component Value Date/Time INR 1.1 10/17/2018 12:53 PM  
 AST (SGOT) 15 11/15/2018 05:24 AM  
 Alk. phosphatase 133 (H) 11/15/2018 05:24 AM  
 Protein, total 5.9 (L) 11/15/2018 05:24 AM  
 Albumin 2.7 (L) 11/15/2018 05:24 AM  
 Globulin 3.2 11/15/2018 05:24 AM  
 
 
No results found for: IRON, FE, TIBC, IBCT, PSAT, FERR Lab Results Component Value Date/Time TSH 0.58 11/13/2018 03:26 PM  
  
 
No results found for: PH, PHI, PCO2, PCO2I, PO2, PO2I, HCO3, HCO3I, FIO2, FIO2I Lab Results Component Value Date/Time CK 25 (L) 11/13/2018 03:26 PM  
 CK-MB Index 4.4 (H) 11/13/2018 03:26 PM  
 Troponin-I, Qt. <0.05 11/13/2018 03:26 PM  
  
 
Lab Results Component Value Date/Time Culture result: ESCHERICHIA COLI (PREDOMINATING) (A) 06/28/2018 07:01 AM  
 Culture result: ENTEROBACTER AEROGENES (A) 06/28/2018 07:01 AM  
 Culture result: (A) 06/28/2018 04:13 AM  
  ENTEROBACTER AEROGENES GROWING IN 1 OF 4 BOTTLES DRAWN (SITE=L UPPER ARM) Culture result: (A) 06/28/2018 04:13 AM  
  PRELIMINARY REPORT OF 
GRAM NEGATIVE COCCOBACILLI 
GROWING IN 1 OF 4 BOTTLES DRAWN 
CALLED TO AND READ BACK BY 
CHANG HUNTLEY RN [facility] AT 3451 ON 6/28/2018. [place] 0345  Culture result: REMAINING BOTTLE(S) HAS/HAVE NO GROWTH IN 5 DAYS 06/28/2018 04:13 AM  
 
 
No results found for: TOXA1, RPR, HBCM, HBSAG, HAAB, HCAB1, HAAT, G6PD, CRYAC, HIVGT, HIVR, HIV1, HIV12, HIVPC, HIVRPI Lab Results Component Value Date/Time CK 25 (L) 11/13/2018 03:26 PM  
 
 
Lab Results Component Value Date/Time Color YELLOW/STRAW 11/13/2018 07:58 PM  
 Appearance CLOUDY (A) 11/13/2018 07:58 PM  
 Specific gravity 1.020 04/16/2017 01:13 PM  
 pH (UA) 5.0 11/13/2018 07:58 PM  
 Protein TRACE (A) 11/13/2018 07:58 PM  
 Glucose >1,000 (A) 11/13/2018 07:58 PM  
 Ketone NEGATIVE  11/13/2018 07:58 PM  
 Bilirubin NEGATIVE  11/13/2018 07:58 PM  
 Blood TRACE (A) 11/13/2018 07:58 PM  
 Urobilinogen 0.2 11/13/2018 07:58 PM  
 Nitrites NEGATIVE  11/13/2018 07:58 PM  
 Leukocyte Esterase MODERATE (A) 11/13/2018 07:58 PM  
 WBC  11/13/2018 07:58 PM  
 RBC 0-5 11/13/2018 07:58 PM  
 Bacteria 4+ (A) 11/13/2018 07:58 PM  
 
 
IMPRESSION 
· COPD exacerbation · Shortness of breath · Pulmonary hypertension, likely secondary to underlying obstructive lung disease and HAYES, but does have a history of CTD · History of DVT, on Xarelto · NAIDA · Closed wedge compression fracture of T7 vertebra PLAN 
· Goal sats >90% · Duonebs; brovana/pulmicort · Singulair · Wean IV steroids to 40 mg q 8hr · No indication for antibiotics at this time · Gentle IVFs; trend creat · Cardiac diet · Ortho following and presenting MRI findings to spine surgeon this morning · Will need to consider treatment of PH if symptoms persist 
· CPAP nightly and with naps for known HAYES · PPI 
· On xarelto [place], [place]

## 2018-11-15 NOTE — ROUTINE PROCESS
Received consult and order for kyphoplasty. In reviewing chart, patient is on Plavix and had stents placed 6/2018 at [address] [place] [address]. Need to have cardiology ok that plavix can be held for 5 days for kyphoplasty. Discussed all the above with Dr. Carl Conklin and Yohana Hilario RN.

## 2018-11-15 NOTE — PROGRESS NOTES
MRI of thoracic spine was completed at 2015 tonight. It shows a T7 compression fracture with posterior retropulsion of the moe fragments. Patient was neurovascularly intact on examination this morning when I saw her. I am going to put her on bed rest as she does not have a brace at this point. Neurovascular checks overnight. Dr. Lizet Mcintosh was notified of MRI. He will present case to Dr. Mickiel Schwab or Dr. Yang Strange (spine surgery) in the morning who will review the case/imaging. We will continue to follow the patient in the morning. Luanne Shelton, PA-C Orthopaedic Surgery  [facility]

## 2018-11-16 LAB
ALBUMIN SERPL-MCNC: 2.5 G/DL (ref 3.5–5)
ALBUMIN/GLOB SERPL: 0.8 {RATIO} (ref 1.1–2.2)
ALP SERPL-CCNC: 125 U/L (ref 45–117)
ALT SERPL-CCNC: 54 U/L (ref 12–78)
ANION GAP SERPL CALC-SCNC: 10 MMOL/L (ref 5–15)
AST SERPL-CCNC: 37 U/L (ref 15–37)
ATRIAL RATE: 416 BPM
BASOPHILS # BLD: 0 K/UL (ref 0–0.1)
BASOPHILS NFR BLD: 0 % (ref 0–1)
BILIRUB SERPL-MCNC: 0.5 MG/DL (ref 0.2–1)
BUN SERPL-MCNC: 30 MG/DL (ref 6–20)
BUN/CREAT SERPL: 32 (ref 12–20)
CALCIUM SERPL-MCNC: 8.4 MG/DL (ref 8.5–10.1)
CALCULATED R AXIS, ECG10: -41 DEGREES
CALCULATED T AXIS, ECG11: 135 DEGREES
CHLORIDE SERPL-SCNC: 106 MMOL/L (ref 97–108)
CO2 SERPL-SCNC: 23 MMOL/L (ref 21–32)
CREAT SERPL-MCNC: 0.93 MG/DL (ref 0.55–1.02)
DIAGNOSIS, 93000: NORMAL
DIFFERENTIAL METHOD BLD: ABNORMAL
EOSINOPHIL # BLD: 0.5 K/UL (ref 0–0.4)
EOSINOPHIL NFR BLD: 5 % (ref 0–7)
ERYTHROCYTE [DISTWIDTH] IN BLOOD BY AUTOMATED COUNT: 15.6 % (ref 11.5–14.5)
GLOBULIN SER CALC-MCNC: 3.3 G/DL (ref 2–4)
GLUCOSE BLD STRIP.AUTO-MCNC: 118 MG/DL (ref 65–100)
GLUCOSE BLD STRIP.AUTO-MCNC: 133 MG/DL (ref 65–100)
GLUCOSE BLD STRIP.AUTO-MCNC: 276 MG/DL (ref 65–100)
GLUCOSE BLD STRIP.AUTO-MCNC: 317 MG/DL (ref 65–100)
GLUCOSE SERPL-MCNC: 227 MG/DL (ref 65–100)
HCT VFR BLD AUTO: 38.9 % (ref 35–47)
HGB BLD-MCNC: 12.1 G/DL (ref 11.5–16)
IMM GRANULOCYTES # BLD: 0 K/UL
IMM GRANULOCYTES NFR BLD AUTO: 0 %
LYMPHOCYTES # BLD: 0.5 K/UL (ref 0.8–3.5)
LYMPHOCYTES NFR BLD: 5 % (ref 12–49)
MAGNESIUM SERPL-MCNC: 2.2 MG/DL (ref 1.6–2.4)
MCH RBC QN AUTO: 29.5 PG (ref 26–34)
MCHC RBC AUTO-ENTMCNC: 31.1 G/DL (ref 30–36.5)
MCV RBC AUTO: 94.9 FL (ref 80–99)
MONOCYTES # BLD: 0.2 K/UL (ref 0–1)
MONOCYTES NFR BLD: 2 % (ref 5–13)
MYELOCYTES NFR BLD MANUAL: 2 %
NEUTS SEG # BLD: 9.2 K/UL (ref 1.8–8)
NEUTS SEG NFR BLD: 86 % (ref 32–75)
NRBC # BLD: 0 K/UL (ref 0–0.01)
NRBC BLD-RTO: 0 PER 100 WBC
PLATELET # BLD AUTO: 178 K/UL (ref 150–400)
PMV BLD AUTO: 10.5 FL (ref 8.9–12.9)
POTASSIUM SERPL-SCNC: 4.9 MMOL/L (ref 3.5–5.1)
PROT SERPL-MCNC: 5.8 G/DL (ref 6.4–8.2)
Q-T INTERVAL, ECG07: 258 MS
QRS DURATION, ECG06: 78 MS
QTC CALCULATION (BEZET), ECG08: 419 MS
RBC # BLD AUTO: 4.1 M/UL (ref 3.8–5.2)
RBC MORPH BLD: ABNORMAL
SERVICE CMNT-IMP: ABNORMAL
SODIUM SERPL-SCNC: 139 MMOL/L (ref 136–145)
VENTRICULAR RATE, ECG03: 159 BPM
WBC # BLD AUTO: 10.7 K/UL (ref 3.6–11)

## 2018-11-16 PROCEDURE — 80053 COMPREHEN METABOLIC PANEL: CPT

## 2018-11-16 PROCEDURE — 74011250636 HC RX REV CODE- 250/636: Performed by: PHYSICIAN ASSISTANT

## 2018-11-16 PROCEDURE — 82962 GLUCOSE BLOOD TEST: CPT

## 2018-11-16 PROCEDURE — 74011000258 HC RX REV CODE- 258: Performed by: INTERNAL MEDICINE

## 2018-11-16 PROCEDURE — 77010033678 HC OXYGEN DAILY

## 2018-11-16 PROCEDURE — 74011000250 HC RX REV CODE- 250

## 2018-11-16 PROCEDURE — 74011250637 HC RX REV CODE- 250/637: Performed by: NURSE PRACTITIONER

## 2018-11-16 PROCEDURE — 74011250636 HC RX REV CODE- 250/636

## 2018-11-16 PROCEDURE — 77030029684 HC NEB SM VOL KT MONA -A

## 2018-11-16 PROCEDURE — 94640 AIRWAY INHALATION TREATMENT: CPT

## 2018-11-16 PROCEDURE — 77030012879 HC MSK CPAP FLL FAC PHIL -B

## 2018-11-16 PROCEDURE — 74011636637 HC RX REV CODE- 636/637: Performed by: FAMILY MEDICINE

## 2018-11-16 PROCEDURE — 74011000250 HC RX REV CODE- 250: Performed by: INTERNAL MEDICINE

## 2018-11-16 PROCEDURE — 93005 ELECTROCARDIOGRAM TRACING: CPT

## 2018-11-16 PROCEDURE — 74011250637 HC RX REV CODE- 250/637: Performed by: PHYSICIAN ASSISTANT

## 2018-11-16 PROCEDURE — 77030038269 HC DRN EXT URIN PURWCK BARD -A

## 2018-11-16 PROCEDURE — 85025 COMPLETE CBC W/AUTO DIFF WBC: CPT

## 2018-11-16 PROCEDURE — 74011250637 HC RX REV CODE- 250/637: Performed by: INTERNAL MEDICINE

## 2018-11-16 PROCEDURE — 65610000006 HC RM INTENSIVE CARE

## 2018-11-16 PROCEDURE — 94760 N-INVAS EAR/PLS OXIMETRY 1: CPT

## 2018-11-16 PROCEDURE — 36415 COLL VENOUS BLD VENIPUNCTURE: CPT

## 2018-11-16 PROCEDURE — 77030013140 HC MSK NEB VYRM -A

## 2018-11-16 PROCEDURE — 83735 ASSAY OF MAGNESIUM: CPT

## 2018-11-16 PROCEDURE — 77030033269 HC SLV COMPR SCD KNE2 CARD -B

## 2018-11-16 RX ORDER — CARVEDILOL 12.5 MG/1
12.5 TABLET ORAL 2 TIMES DAILY WITH MEALS
Status: DISCONTINUED | OUTPATIENT
Start: 2018-11-16 | End: 2018-11-17

## 2018-11-16 RX ORDER — DIGOXIN 0.25 MG/ML
INJECTION INTRAMUSCULAR; INTRAVENOUS
Status: COMPLETED
Start: 2018-11-16 | End: 2018-11-16

## 2018-11-16 RX ORDER — DILTIAZEM HYDROCHLORIDE 5 MG/ML
10 INJECTION INTRAVENOUS ONCE
Status: COMPLETED | OUTPATIENT
Start: 2018-11-16 | End: 2018-11-16

## 2018-11-16 RX ORDER — PREDNISONE 20 MG/1
20 TABLET ORAL 2 TIMES DAILY WITH MEALS
Status: DISCONTINUED | OUTPATIENT
Start: 2018-11-16 | End: 2018-11-21

## 2018-11-16 RX ORDER — CLOPIDOGREL BISULFATE 75 MG/1
75 TABLET ORAL DAILY
Status: DISCONTINUED | OUTPATIENT
Start: 2018-11-16 | End: 2018-11-21

## 2018-11-16 RX ORDER — DIGOXIN 0.25 MG/ML
250 INJECTION INTRAMUSCULAR; INTRAVENOUS ONCE
Status: COMPLETED | OUTPATIENT
Start: 2018-11-16 | End: 2018-11-16

## 2018-11-16 RX ORDER — DILTIAZEM HYDROCHLORIDE 5 MG/ML
INJECTION INTRAVENOUS
Status: COMPLETED
Start: 2018-11-16 | End: 2018-11-16

## 2018-11-16 RX ADMIN — INSULIN LISPRO 10 UNITS: 100 INJECTION, SOLUTION INTRAVENOUS; SUBCUTANEOUS at 17:10

## 2018-11-16 RX ADMIN — IPRATROPIUM BROMIDE AND ALBUTEROL SULFATE 3 ML: .5; 3 SOLUTION RESPIRATORY (INHALATION) at 13:46

## 2018-11-16 RX ADMIN — SAXAGLIPTIN 5 MG: 5 TABLET, FILM COATED ORAL at 10:01

## 2018-11-16 RX ADMIN — PREDNISONE 20 MG: 20 TABLET ORAL at 17:10

## 2018-11-16 RX ADMIN — PREDNISONE 20 MG: 20 TABLET ORAL at 10:01

## 2018-11-16 RX ADMIN — INSULIN LISPRO 4 UNITS: 100 INJECTION, SOLUTION INTRAVENOUS; SUBCUTANEOUS at 21:19

## 2018-11-16 RX ADMIN — CLOPIDOGREL BISULFATE 75 MG: 75 TABLET ORAL at 13:22

## 2018-11-16 RX ADMIN — Medication 10 ML: at 21:14

## 2018-11-16 RX ADMIN — IPRATROPIUM BROMIDE AND ALBUTEROL SULFATE 3 ML: .5; 3 SOLUTION RESPIRATORY (INHALATION) at 02:02

## 2018-11-16 RX ADMIN — MONTELUKAST SODIUM 10 MG: 10 TABLET, COATED ORAL at 10:01

## 2018-11-16 RX ADMIN — ISOSORBIDE MONONITRATE 30 MG: 30 TABLET, EXTENDED RELEASE ORAL at 10:57

## 2018-11-16 RX ADMIN — Medication 10 ML: at 06:28

## 2018-11-16 RX ADMIN — DILTIAZEM HYDROCHLORIDE 10 MG: 5 INJECTION INTRAVENOUS at 09:33

## 2018-11-16 RX ADMIN — DULOXETINE 60 MG: 30 CAPSULE, DELAYED RELEASE ORAL at 10:01

## 2018-11-16 RX ADMIN — OXYCODONE AND ACETAMINOPHEN 1 TABLET: 5; 325 TABLET ORAL at 12:00

## 2018-11-16 RX ADMIN — DILTIAZEM HYDROCHLORIDE 10 MG/HR: 5 INJECTION INTRAVENOUS at 09:33

## 2018-11-16 RX ADMIN — IPRATROPIUM BROMIDE AND ALBUTEROL SULFATE 3 ML: .5; 3 SOLUTION RESPIRATORY (INHALATION) at 20:01

## 2018-11-16 RX ADMIN — PANTOPRAZOLE SODIUM 40 MG: 40 TABLET, DELAYED RELEASE ORAL at 17:10

## 2018-11-16 RX ADMIN — SODIUM CHLORIDE 75 ML/HR: 900 INJECTION, SOLUTION INTRAVENOUS at 22:37

## 2018-11-16 RX ADMIN — RIVAROXABAN 20 MG: 20 TABLET, FILM COATED ORAL at 17:10

## 2018-11-16 RX ADMIN — OXYCODONE AND ACETAMINOPHEN 1 TABLET: 5; 325 TABLET ORAL at 16:10

## 2018-11-16 RX ADMIN — CARVEDILOL 6.25 MG: 6.25 TABLET, FILM COATED ORAL at 10:01

## 2018-11-16 RX ADMIN — BUDESONIDE 500 MCG: 0.5 INHALANT RESPIRATORY (INHALATION) at 20:01

## 2018-11-16 RX ADMIN — CARVEDILOL 12.5 MG: 12.5 TABLET, FILM COATED ORAL at 17:10

## 2018-11-16 RX ADMIN — DIGOXIN 250 MCG: 0.25 INJECTION INTRAMUSCULAR; INTRAVENOUS at 10:01

## 2018-11-16 RX ADMIN — LISINOPRIL 5 MG: 5 TABLET ORAL at 10:57

## 2018-11-16 RX ADMIN — PANTOPRAZOLE SODIUM 40 MG: 40 TABLET, DELAYED RELEASE ORAL at 06:45

## 2018-11-16 RX ADMIN — METHYLPREDNISOLONE SODIUM SUCCINATE 40 MG: 40 INJECTION, POWDER, FOR SOLUTION INTRAMUSCULAR; INTRAVENOUS at 05:00

## 2018-11-16 NOTE — PROGRESS NOTES
0900-Pt in  afib w/ RVR; RR called; 12 lead ekg done; cardiology at bedside; pt transferred to ICU; report given to ICU nurse.

## 2018-11-16 NOTE — PROGRESS NOTES
Spiritual Care Assessment/Progress Note [address] 
 
 
NAME: Francis Zambrano      MRN: [id number] AGE: 81 y.o. SEX: female Alevism Affiliation: No preference Language: English  
 
11/16/2018     Total Time (in minutes): 20 Spiritual Assessment begun in [facility] INTENSIVE CARE through conversation with: 
  
    [x]Patient        [] Family    [] Friend(s) Reason for Consult: Initial/Spiritual assessment, patient floor Spiritual beliefs: (Please include comment if needed) [x] Identifies with a leanna tradition:     
   [] Supported by a leanna community:        
   [] Claims no spiritual orientation:       
   [] Seeking spiritual identity:            
   [] Adheres to an individual form of spirituality:       
   [] Not able to assess:                   
 
    
Identified resources for coping:  
   [x] Prayer                           
   [] Music                  [] Guided Imagery [x] Family/friends                 [] Pet visits [] Devotional reading                         [] Unknown 
   [] Other:                                          
 
 
Interventions offered during this visit: (See comments for more details) Patient Interventions: Affirmation of emotions/emotional suffering, Affirmation of leanna, Catharsis/review of pertinent events in supportive environment, Coping skills reviewed/reinforced, Iconic (affirming the presence of God/Higher Power), Life review/legacy, Normalization of emotional/spiritual concerns, Prayer (assurance of), Alevism beliefs/image of God discussed Plan of Care: 
 
 [x] Support spiritual and/or cultural needs  
 [] Support AMD and/or advance care planning process    
 [] Support grieving process 
 [] Coordinate Rites and/or Rituals  
 [] Coordination with community clergy [] No spiritual needs identified at this time 
 [] Detailed Plan of Care below (See Comments)  [] Make referral to Music Therapy [] Make referral to Pet Therapy    
[] Make referral to Addiction services 
[] Make referral to [facility] 
[] Make referral to Spiritual Care Partner 
[] No future visits requested       
[] Follow up visits as needed Comments: [employer] is visiting for an initial spiritual assessment, after RRT, with pt in room 3015.  offered a supportive presence with pt, who processed her medical concerns and notes she is feeling better after this morning's RRT. Pt notes she was raised Episcopalian, became Sikh, and eventually Alevism (after marriage to her ). She processed her downsizing in life and moving into a halfway community. Support provided through active listening, conversation and encouragement. Chaplain My Brito M.Div, M.S, [facility] Spiritual Care available at [address] [place] [phone]

## 2018-11-16 NOTE — PROGRESS NOTES
Daily Progress Note: 11/16/2018 [facility] Diana Collins MD 
 
Assessment/Plan: Dyspnea (11/13/2018) - workup with Pul and Card. - likely some degree of COPD and Pul hypertension and due to supra-morbid obesity and                 some due to hypoventilation due to back pain - Pulmonary following and added IV steroids trial; V/Q scan with low prob for PE 
            - consulted Orthopedics - may benefit from Kyphoplasty  
  
  Type II diabetes mellitus ([facility] (10/9/2015) - with reported nephropathy with proteinuria without CKD 
            - continue saxagliptin and follow on SSI 
  
  Coronary artery disease involving native coronary artery without angina pectoris (1/22/2016) - stable, continue cardiac meds 
  
  Essential hypertension (1/22/2016) - cont home meds - adjust as needed 
  
  Chronic anticoagulation (3/30/2018) - hold Xarelto for possible kyphoplasty 
  
  Obesity, BMI 30-39. 9(HCC) (10/1/2018) - counseled on weight loss, this is contributing to her current symptoms 
            - looks very Pickwickian  
  
  ARF (acute renal failure) ([facility] (11/13/2018) - creatinine up acutely from prior, possibly due to diuresis 
            - hold diuretic  
  
  Closed wedge compression fracture of T7 vertebra ([facility] (11/13/2018) - consulted Orthopedics, hopefully she will be a candidate for kyphoplasty Code status: 
            - patient is FULL CODE, no AMD on file, her daughter Maylin Briseno is her surrogate 
   
 
Problem List: 
Problem List as of 11/16/2018 Date Reviewed: 11/13/2018 Codes Class Noted - Resolved * (Principal) Dyspnea ICD-10-CM: R06.00 
ICD-9-CM: 786.09  11/13/2018 - Present ARF (acute renal failure) (HCC) ICD-10-CM: N17.9 ICD-9-CM: 584.9  11/13/2018 - Present Obesity (BMI 30-39.9) (Chronic) ICD-10-CM: X98.0 ICD-9-CM: 278.00  11/13/2018 - Present Closed wedge compression fracture of T7 vertebra ([facility] ICD-10-CM: V51.607Q ICD-9-CM: 805.2  11/13/2018 - Present Type 2 diabetes with nephropathy ([facility] ICD-10-CM: E11.21 
ICD-9-CM: 250.40, 583.81  10/1/2018 - Present Chest pain ICD-10-CM: R07.9 ICD-9-CM: 786.50  6/27/2018 - Present Generalized abdominal pain ICD-10-CM: R10.84 ICD-9-CM: 789.07  6/27/2018 - Present Recurrent deep vein thrombosis (DVT) (HCC) ICD-10-CM: I82.409 ICD-9-CM: 453.40  3/30/2018 - Present Chronic anticoagulation (Chronic) ICD-10-CM: Z79.01 
ICD-9-CM: V58.61  3/30/2018 - Present Coronary artery disease involving native coronary artery without angina pectoris (Chronic) ICD-10-CM: I25.10 ICD-9-CM: 414.01  1/22/2016 - Present Essential hypertension (Chronic) ICD-10-CM: I10 
ICD-9-CM: 401.9  1/22/2016 - Present Type II diabetes mellitus (HCC) (Chronic) ICD-10-CM: E11.9 ICD-9-CM: 250.00  10/9/2015 - Present NSTEMI (non-ST elevated myocardial infarction) ([facility] ICD-10-CM: I21.4 ICD-9-CM: 410.70  10/9/2015 - Present RESOLVED: CAD (coronary artery disease) ICD-10-CM: I25.10 ICD-9-CM: 414.00  10/9/2015 - 1/22/2016 RESOLVED: HTN (hypertension) ICD-10-CM: I10 
ICD-9-CM: 401.9  10/9/2015 - 1/22/2016 Subjective:  
  81 y.o.  female who is admitted with SOB. Ms. Leon Howell presented to the Emergency Department this PM complaining of SOB: for the past 3 months, worked up by Pulmonary and Cardiology as an outpatient without any definite diagnosis, not hypoxic, associated with back pain, unable to take a deep breath without pain, back pain started at the same time as the SOB. History obtained from chart review and the patient. Previous records reviewed, recent admit for chest pain. (Dr Edilberto Reaves). 11/14:  Feels much better this AM with less back pain on meds.   Still very tender over site with any palpation. Less air hunger but still \"not breathing back to normal yet. \"  Glucoses up with the steroids. Pul and Card seeing also. 11/15:    Reports she continues to feel better and reports no longer SOB. She is not moving much due to back pain. No chest pains. Ortho to decide next step for compression fx. No complaints of weakness or numbness LEs. 
 
:  No longer short of breath - will wean steroids to po. IR plans kyphoplasty today. May benefit from Prolia for osteoporosis outpt  
  
Review of Systems: A comprehensive review of systems was negative except for that written in the HPI. Objective:  
Physical Exam:  
 
Visit Vitals /74 (BP 1 Location: Right arm, BP Patient Position: At rest) Pulse (!) 101 Temp 97.5 °F (36.4 °C) Resp 20 Ht 5' 7\" (1.702 m) Wt 110.2 kg (243 lb) SpO2 96% Breastfeeding? No  
BMI 38.06 kg/m² O2 Flow Rate (L/min): 2 l/min O2 Device: Room air Temp (24hrs), Av.7 °F (36.5 °C), Min:97.5 °F (36.4 °C), Max:97.9 °F (36.6 °C) No intake/output data recorded.  1901 -  0700 In: 8985 [P.O.:1740] Out:  [WWUER:8036] General:  Alert, cooperative, obese, no distress, appears stated age. Head:  Normocephalic, without obvious abnormality, atraumatic. Eyes:  Conjunctivae/corneas clear. PERRL, EOMs intact. Throat: Lips, mucosa, and tongue moist.. Neck: Supple, symmetrical, trachea midline, no adenopathy, thyroid: no enlargement/tenderness/nodules, no carotid bruit and no JVD. Back:   Symmetric,with marked tenderness to palpation over T6-7-8 area. Lungs:   Diminished BS bilaterally but otherwise clear at this time. Chest wall:  No tenderness or deformity. Heart:  Regular rate and rhythm, S1, S2 normal, no murmur, click, rub or gallop. Abdomen:   Soft, non-tender. Bowel sounds normal. No masses,  No organomegaly. Extremities: no cyanosis. Trace LE edema. No calf tenderness or cords. Skin:  turgor normal.   
Neurologic: CNII-XII intact. Alert and oriented X 3. Fine motor of hands and fingers normal.   equal.  No cogwheeling or rigidity. Gait not tested at this time. Sensation grossly normal to touch. Gross motor of extremities normal.    
 
Data Review:  
  CT of Chest 11/13/18 FINDINGS: 
THYROID: No nodule. MEDIASTINUM: No mass or lymphadenopathy. SONJA: No mass or lymphadenopathy. THORACIC AORTA: No aneurysm. Mild vascular calcifications MAIN PULMONARY ARTERY: Normal in caliber. TRACHEA/BRONCHI: Patent. ESOPHAGUS: No wall thickening or dilatation. Small hiatal hernia HEART: Normal in size. PLEURA: No effusion or pneumothorax. LUNGS: No nodule, mass, or airspace disease. Bilateral atelectatic changes. INCIDENTALLY IMAGED UPPER ABDOMEN: No focal abnormality. BONES: Bones are osteopenic. There is collapse of the superior endplate of T7. This has occurred since June 2018 Possible milk of calcium cyst within the right breast 
IMPRESSION: 
1. No acute cardiopulmonary disease 2. Interval collapse superior endplate of T7. The patient has back pain this may 
be acute to subacute and MR imaging would better characterize 
  
Recent Days: 
Recent Labs 11/16/18 
0400 11/15/18 
0524 11/13/18 
1526 WBC 10.7 9.4 13.9* HGB 12.1 11.9 13.0 HCT 38.9 39.0 42.5  202 260 Recent Labs 11/16/18 
0400 11/15/18 
0524 11/14/18 
1734 11/13/18 
1526  138 139 138  
K 4.9 4.4 4.1 4.6  104 106 101 CO2 23 26 26 28 * 347* 320* 350* BUN 30* 30* 27* 36* CREA 0.93 1.11* 0.86 1.75* CA 8.4* 8.1* 7.4* 8.5 MG 2.2 2.1  --  2.0 ALB 2.5* 2.7*  --  3.1* TBILI 0.5 0.5  --  0.6 SGOT 37 15  --  24 ALT 54 53  --  72 No results for input(s): PH, PCO2, PO2, HCO3, FIO2 in the last 72 hours. 24 Hour Results: 
Recent Results (from the past 24 hour(s)) GLUCOSE, POC Collection Time: 11/15/18  7:47 AM  
Result Value Ref Range Glucose (POC) 315 (H) 65 - 100 mg/dL Performed by Sammie Conklin  (PCT) GLUCOSE, POC Collection Time: 11/15/18 11:11 AM  
Result Value Ref Range Glucose (POC) 352 (H) 65 - 100 mg/dL Performed by Sammie Conklin  (PCT) GLUCOSE, POC Collection Time: 11/15/18  3:18 PM  
Result Value Ref Range Glucose (POC) 282 (H) 65 - 100 mg/dL Performed by Sammie Conklin  (PCT) GLUCOSE, POC Collection Time: 11/15/18  9:45 PM  
Result Value Ref Range Glucose (POC) 294 (H) 65 - 100 mg/dL Performed by Erlin Maxwell (PCT) METABOLIC PANEL, COMPREHENSIVE Collection Time: 11/16/18  4:00 AM  
Result Value Ref Range Sodium 139 136 - 145 mmol/L Potassium 4.9 3.5 - 5.1 mmol/L Chloride 106 97 - 108 mmol/L  
 CO2 23 21 - 32 mmol/L Anion gap 10 5 - 15 mmol/L Glucose 227 (H) 65 - 100 mg/dL BUN 30 (H) 6 - 20 MG/DL Creatinine 0.93 0.55 - 1.02 MG/DL  
 BUN/Creatinine ratio 32 (H) 12 - 20 GFR est AA >60 >60 ml/min/1.73m2 GFR est non-AA 60 (L) >60 ml/min/1.73m2 Calcium 8.4 (L) 8.5 - 10.1 MG/DL Bilirubin, total 0.5 0.2 - 1.0 MG/DL  
 ALT (SGPT) 54 12 - 78 U/L  
 AST (SGOT) 37 15 - 37 U/L Alk. phosphatase 125 (H) 45 - 117 U/L Protein, total 5.8 (L) 6.4 - 8.2 g/dL Albumin 2.5 (L) 3.5 - 5.0 g/dL Globulin 3.3 2.0 - 4.0 g/dL A-G Ratio 0.8 (L) 1.1 - 2.2 MAGNESIUM Collection Time: 11/16/18  4:00 AM  
Result Value Ref Range Magnesium 2.2 1.6 - 2.4 mg/dL CBC WITH AUTOMATED DIFF Collection Time: 11/16/18  4:00 AM  
Result Value Ref Range WBC 10.7 3.6 - 11.0 K/uL  
 RBC 4.10 3.80 - 5.20 M/uL  
 HGB 12.1 11.5 - 16.0 g/dL HCT 38.9 35.0 - 47.0 % MCV 94.9 80.0 - 99.0 FL  
 MCH 29.5 26.0 - 34.0 PG  
 MCHC 31.1 30.0 - 36.5 g/dL  
 RDW 15.6 (H) 11.5 - 14.5 % PLATELET 662 885 - 622 K/uL MPV 10.5 8.9 - 12.9 FL  
 NRBC 0.0 0  WBC ABSOLUTE NRBC 0.00 0.00 - 0.01 K/uL NEUTROPHILS 86 (H) 32 - 75 % LYMPHOCYTES 5 (L) 12 - 49 % MONOCYTES 2 (L) 5 - 13 % EOSINOPHILS 5 0 - 7 % BASOPHILS 0 0 - 1 % MYELOCYTES 2 (H) 0 % IMMATURE GRANULOCYTES 0 %  
 ABS. NEUTROPHILS 9.2 (H) 1.8 - 8.0 K/UL  
 ABS. LYMPHOCYTES 0.5 (L) 0.8 - 3.5 K/UL  
 ABS. MONOCYTES 0.2 0.0 - 1.0 K/UL  
 ABS. EOSINOPHILS 0.5 (H) 0.0 - 0.4 K/UL  
 ABS. BASOPHILS 0.0 0.0 - 0.1 K/UL  
 ABS. IMM. GRANS. 0.0 K/UL  
 DF MANUAL    
 RBC COMMENTS ANISOCYTOSIS 
1+ Medications reviewed Current Facility-Administered Medications Medication Dose Route Frequency  methylPREDNISolone (PF) (SOLU-MEDROL) injection 40 mg  40 mg IntraVENous Q8H  
 0.9% sodium chloride infusion  75 mL/hr IntraVENous CONTINUOUS  
 arformoterol (BROVANA) neb solution 15 mcg  15 mcg Nebulization BID RT  
 budesonide (PULMICORT) 500 mcg/2 ml nebulizer suspension  500 mcg Nebulization BID RT  
 albuterol-ipratropium (DUO-NEB) 2.5 MG-0.5 MG/3 ML  3 mL Nebulization Q6H RT  
 carvedilol (COREG) tablet 6.25 mg  6.25 mg Oral BID WITH MEALS  DULoxetine (CYMBALTA) capsule 60 mg  60 mg Oral DAILY  isosorbide mononitrate ER (IMDUR) tablet 30 mg  30 mg Oral DAILY  lisinopril (PRINIVIL, ZESTRIL) tablet 5 mg  5 mg Oral DAILY  montelukast (SINGULAIR) tablet 10 mg  10 mg Oral DAILY  sAXagliptin (ONGLYZA) tablet 5 mg  5 mg Oral DAILY  sodium chloride (NS) flush 5-10 mL  5-10 mL IntraVENous Q8H  
 sodium chloride (NS) flush 5-10 mL  5-10 mL IntraVENous PRN  
 acetaminophen (TYLENOL) tablet 650 mg  650 mg Oral Q4H PRN  
 oxyCODONE-acetaminophen (PERCOCET) 5-325 mg per tablet 1 Tab  1 Tab Oral Q4H PRN  
 HYDROmorphone (PF) (DILAUDID) injection 0.5 mg  0.5 mg IntraVENous Q4H PRN  prochlorperazine (COMPAZINE) injection 10 mg  10 mg IntraVENous Q6H PRN  
 zolpidem (AMBIEN) tablet 5 mg  5 mg Oral QHS PRN  
 insulin lispro (HUMALOG) injection   SubCUTAneous QID WITH MEALS  glucose chewable tablet 16 g  4 Tab Oral PRN  
  dextrose (D50W) injection syrg 12.5-25 g  25-50 mL IntraVENous PRN  
 glucagon (GLUCAGEN) injection 1 mg  1 mg IntraMUSCular PRN  pantoprazole (PROTONIX) tablet 40 mg  40 mg Oral ACB&D Care Plan discussed with: Patient and Nurse Total time spent with patient: 30 minutes.  
 
Celena Koroma MD/Christine Sanchez MD

## 2018-11-16 NOTE — CONSULTS
Fran Sandy MD 
 
[address] [facility] [place], [address] [facility] Office [phone] Pager [phone] Date of  Admission: 11/13/2018  3:02 PM 
PCP- Vanda House MD 
 
Guille Browne is a 81 y.o. female admitted for Dyspnea;Dyspnea. Consult requested by Cedrick Corrigan MD 
 
Assessment/Plan Closed wedge compression fracture of T7 vertebra (11/13/2018) - requires kyphoplasty Acute on chronic CKD Dyspnea CAD s/p PCI to RCA with BMS ( NSTEMI 10/2015); 6/6/18- LAD Stent Resolute(SAMUEL/D1 PTCA (at [facility]) HTN -  
HLD - intolerant to multiple statins sec to myalgia. Unable to afford PCSK9 Inhibitors DM - Not well controlled History of right DVT-October 2016; Has had prior DVT 1998 Chronic anticoagulation Asthma Plan[de-identified] 
Patient has had recent stents placed in June 2018. Would prefer to continue Plavix in view of stent placement less than 6 months. Okay to hold Xarelto. Dyspnea probably multifactorial-pulmonary/acute on chronic diastolic heart failure/acute on chronic kidney disease. On IV steroids. Was on diuretics. On hold secondary to renal insufficiency. Add - Pt went in Afib with RVR Transferred to ICU Cardizem bolus with gtt Dig IV  
D/w Dr Cleopatra Ramirez I appreciate the opportunity to be involved in Ms. Sellers. See below note for details. Please do not hesitate to contact us with questions or concerns. Fran Sandy MD 
 
Cardiac Testing/ Procedures: A. Cardiac Cath/PCI: 6/6/18 At [facility] - LAD stent/PTCA of D1 
  
10/9/15 L Main: Medl;Nml 
  
LAD: Prox- Med; 50%; Distal - small; D1 - Small to med - prox 60% 
  
LCflex: Large; Tortuous; MLI; GUILLERMO - med - MLI 
  
RCA: Med; Prox 80%; Distal 95% just before bifurcation into small PDA and PLB 
  
LVEDP: 22 
  
LVEF: 55%/ Mild basal inf hypokinesis 
  
No significant gradient across aortic valve. 
  
PCI: JR4 guide/BMW Pre dil with 2 by 12 balloon BMS 2.25 by 22 deployed at high [place]ena distally BMS 2.5 by 18 deployed at high [place] proximal lesion Post dil with 2.5 by 15 
  
  
B. ECHO/MEE: 6/2018 - Left ventricle: Systolic function was normal. Ejection fraction was 
estimated to be 60 %. There were no regional wall motion abnormalities. Doppler parameters were consistent with abnormal left ventricular 
relaxation (grade 1 diastolic dysfunction). Aortic valve: Leaflets exhibited normal cuspal separation and good 
mobility. Not well visualized. 
  
 10/11/15 - Left ventricle: Systolic function was vigorous. Ejection fraction was 
estimated in the range of 65 % to 70 %. There were no regional wall motion 
abnormalities. 
  
C. StressNuclear/Stress ECHO/Stress test: 
  
D. Vascular: 
  
E. EP: 
  
F. Miscellaneous: 
 
 
 
Care Plan discussed with: Pt/Nursing Subjective: 
 
Patient is a 72-year-old  female with multiple medical problems including CAD, asthma, chronic dyspnea, CKD, obesity, on chronic anticoagulation for recurrent DVT who is admitted for dyspnea/back pain. History of chronic dyspnea but worsened recently. No chest pain. Diagnosed to have vertebral fracture. Scheduled for kyphoplasty. Cardiology consulted to assist with medications since patient has been on Plavix/Xarelto prior to admission. History of stent placement in June 2018. No dizziness, syncope. Back pain improved on medications. Past Medical History:  
Diagnosis Date  Asthma  Autoimmune disease ([address]   
 fibromyalgia  CAD (coronary artery disease) 10/9/2015  Closed wedge compression fracture of T7 vertebra ([address] 11/13/2018  Diabetes ([address]  DVT (deep vein thrombosis) in pregnancy ([address]  GERD (gastroesophageal reflux disease)  HTN (hypertension)  NSTEMI (non-ST elevated myocardial infarction) ([address] 10/9/2015  Obesity (BMI 30-39.9) 11/13/2018  Sleep apnea   
 wears CPAP Past Surgical History:  
Procedure Laterality Date  ABDOMEN SURGERY PROC UNLISTED    
 hital hernia repair, gall bladder removed  BREAST SURGERY PROCEDURE UNLISTED    
 lumpectomy  CARDIAC SURG PROCEDURE UNLIST  10/9/15  
 2 stents [facility]  HX COLOSTOMY    
 and reversal, post episiotomy repair [address] [place]  HX UROLOGICAL  2009  
 bladder sling Allergies Allergen Reactions  Advair Diskus [Fluticasone-Salmeterol] Rash  Aspartame Other (comments)  Ciprofloxacin Rash  Statins-Hmg-Coa Reductase Inhibitors Other (comments) Muscle pain Lipitor/crestor/zocor  Sulfa (Sulfonamide Antibiotics) Rash  Tetracycline Other (comments) musclepain  Zetia [Ezetimibe] Myalgia Family History Problem Relation Age of Onset  Diabetes Mother  Heart Failure Mother  Kidney Disease Mother  Diabetes Father  Heart Disease Father  Elevated Lipids Father  Heart Failure Father  Heart Disease Brother  Cancer Brother   
     kidney  Diabetes Brother  No Known Problems Brother  No Known Problems Brother Social History Tobacco Use  Smoking status: Former Smoker Last attempt to quit: 3/30/2017 Years since quittin.6  Smokeless tobacco: Never Used Substance Use Topics  Alcohol use: No  
  Alcohol/week: 0.0 oz  
  Comment: very very rarely  Drug use: No  
 
 
 
 Medications: 
Medications Prior to Admission Medication Sig  
 tiotropium (SPIRIVA WITH HANDIHALER) 18 mcg inhalation capsule Take 1 Cap by inhalation daily.  albuterol (PROVENTIL VENTOLIN) 2.5 mg /3 mL (0.083 %) nebulizer solution 2.5 mg by Nebulization route two (2) times a day.  carvedilol (COREG) 12.5 mg tablet Take 6.25 mg by mouth two (2) times daily (with meals).  montelukast (SINGULAIR) 10 mg tablet Take 10 mg by mouth daily.  bumetanide (BUMEX) 0.5 mg tablet Take 1 mg by mouth daily as needed.  isosorbide mononitrate ER (IMDUR) 30 mg tablet TAKE 1 TABLET BY MOUTH ONCE DAILY AS DIRECTED  omeprazole (PRILOSEC) 20 mg capsule Take 20 mg by mouth two (2) times a day.  albuterol (PROAIR HFA) 90 mcg/actuation inhaler Take  by inhalation every four (4) hours as needed for Wheezing.  clopidogrel (PLAVIX) 75 mg tab Take 75 mg by mouth daily.  rivaroxaban (XARELTO) 15 mg tab tablet Take 15 mg by mouth daily (with dinner).  lactobacillus rhamnosus gg 10 billion cell (CULTURELLE) 10 billion cell capsule Take 1 Cap by mouth daily.  promethazine (PHENERGAN) 25 mg tablet Take 25 mg by mouth every six (6) hours as needed for Nausea.  HYDROcodone-acetaminophen (NORCO) 5-325 mg per tablet Take 1 Tab by mouth every four (4) hours as needed for Pain.  acetaminophen (TYLENOL) 325 mg tablet Take 650 mg by mouth every six (6) hours as needed for Pain.  loperamide (IMMODIUM) 2 mg tablet Take 2 mg by mouth two (2) times a day.  potassium chloride (K-DUR, KLOR-CON) 20 mEq tablet Take 20 mEq by mouth daily (with dinner).  lisinopril (PRINIVIL, ZESTRIL) 5 mg tablet TAKE 1 TABLET BY MOUTH DAILY  biotin 10,000 mcg cap Take 1 Cap by mouth daily.  saxagliptin (ONGLYZA) 5 mg tab tablet Take 5 mg by mouth daily. Takes at [employer]  DULoxetine (CYMBALTA) 60 mg capsule Take 60 mg by mouth daily.  cholecalciferol (VITAMIN D3) 1,000 unit tablet Take 1,000 Units by mouth daily.  azelastine-fluticasone (DYMISTA) 137-50 mcg/spray spry 1 Spray by Nasal route two (2) times a day.  mometasone-formoterol (DULERA) 200-5 mcg/actuation HFA inhaler Take 2 Puffs by inhalation two (2) times a day.  predniSONE (DELTASONE) 10 mg tablet Take 20 mg by mouth daily. Current Facility-Administered Medications Medication Dose Route Frequency  predniSONE (DELTASONE) tablet 20 mg  20 mg Oral BID WITH MEALS  
 0.9% sodium chloride infusion  75 mL/hr IntraVENous CONTINUOUS  
  arformoterol (BROVANA) neb solution 15 mcg  15 mcg Nebulization BID RT  
 budesonide (PULMICORT) 500 mcg/2 ml nebulizer suspension  500 mcg Nebulization BID RT  
 albuterol-ipratropium (DUO-NEB) 2.5 MG-0.5 MG/3 ML  3 mL Nebulization Q6H RT  
 carvedilol (COREG) tablet 6.25 mg  6.25 mg Oral BID WITH MEALS  DULoxetine (CYMBALTA) capsule 60 mg  60 mg Oral DAILY  isosorbide mononitrate ER (IMDUR) tablet 30 mg  30 mg Oral DAILY  lisinopril (PRINIVIL, ZESTRIL) tablet 5 mg  5 mg Oral DAILY  montelukast (SINGULAIR) tablet 10 mg  10 mg Oral DAILY  sAXagliptin (ONGLYZA) tablet 5 mg  5 mg Oral DAILY  sodium chloride (NS) flush 5-10 mL  5-10 mL IntraVENous Q8H  
 sodium chloride (NS) flush 5-10 mL  5-10 mL IntraVENous PRN  
 acetaminophen (TYLENOL) tablet 650 mg  650 mg Oral Q4H PRN  
 oxyCODONE-acetaminophen (PERCOCET) 5-325 mg per tablet 1 Tab  1 Tab Oral Q4H PRN  
 HYDROmorphone (PF) (DILAUDID) injection 0.5 mg  0.5 mg IntraVENous Q4H PRN  prochlorperazine (COMPAZINE) injection 10 mg  10 mg IntraVENous Q6H PRN  
 zolpidem (AMBIEN) tablet 5 mg  5 mg Oral QHS PRN  
 insulin lispro (HUMALOG) injection   SubCUTAneous QID WITH MEALS  glucose chewable tablet 16 g  4 Tab Oral PRN  
 dextrose (D50W) injection syrg 12.5-25 g  25-50 mL IntraVENous PRN  
 glucagon (GLUCAGEN) injection 1 mg  1 mg IntraMUSCular PRN  pantoprazole (PROTONIX) tablet 40 mg  40 mg Oral ACB&D Review of Systems: 
(bold if positive, if negative) As in HPI - rest of ROS noncontributory Physical Exam: 
Visit Vitals /74 (BP 1 Location: Right arm, BP Patient Position: At rest) Pulse (!) 101 Temp 97.5 °F (36.4 °C) Resp 20 Ht 5' 7\" (1.702 m) Wt 243 lb (110.2 kg) SpO2 96% Breastfeeding? No  
BMI 38.06 kg/m² Telemetry:  
 
Gen: Well-developed, well-nourished, in no acute distress HEENT:  Pink conjunctivae, hearing intact to voice, moist mucous membranes Neck: Supple,No JVD, No Carotid Bruit, Thyroid- non tender Resp: No accessory muscle use, Dec AE bilat, scattered rhonchi present Card: Regular Rate,Rythm,Normal S1, S2, 2/6 sys murmur+,No rubs or gallop. No thrills. Abd:  Soft, non-tender, non-distended, normoactive bowel sounds are present,  
MSK: No cyanosis Skin: No rashes Neuro:   moving all four extremities, no focal deficit, follows commands appropriately Psych:  Good insight, oriented to person, place and time, alert, Nml Affect LE: No edema Vascular:Radial Pulses 2+ and symmetric EKG:  Reviewed Results for orders placed or performed during the hospital encounter of 11/13/18 EKG, 12 LEAD, INITIAL Result Value Ref Range Ventricular Rate 109 BPM  
 Atrial Rate 109 BPM  
 P-R Interval 128 ms QRS Duration 76 ms  
 Q-T Interval 344 ms QTC Calculation (Bezet) 463 ms Calculated P Axis 64 degrees Calculated R Axis -66 degrees Calculated T Axis 50 degrees Diagnosis Sinus tachycardia Right atrial enlargement Pulmonary disease pattern Left anterior fascicular block Abnormal ECG When compared with ECG of 27-JUN-2018 10:35, No significant change was found Confirmed by Mayra Becker M.D., Maris Elizabeth ([id number]) on 11/14/2018 4:43:49 PM 
  
 
 
 
Cxray: 
 
LABS: 
 
 
 
Lab Results Component Value Date/Time WBC 10.7 11/16/2018 04:00 AM  
 HGB 12.1 11/16/2018 04:00 AM  
 HCT 38.9 11/16/2018 04:00 AM  
 PLATELET 856 36/13/4987 04:00 AM  
 MCV 94.9 11/16/2018 04:00 AM  
 
Lab Results Component Value Date/Time  Sodium 139 11/16/2018 04:00 AM  
 Potassium 4.9 11/16/2018 04:00 AM  
 Chloride 106 11/16/2018 04:00 AM  
 CO2 23 11/16/2018 04:00 AM  
 Anion gap 10 11/16/2018 04:00 AM  
 Glucose 227 (H) 11/16/2018 04:00 AM  
 BUN 30 (H) 11/16/2018 04:00 AM  
 Creatinine 0.93 11/16/2018 04:00 AM  
 BUN/Creatinine ratio 32 (H) 11/16/2018 04:00 AM  
 GFR est AA >60 11/16/2018 04:00 AM  
 GFR est non-AA 60 (L) 11/16/2018 04:00 AM  
 Calcium 8.4 (L) 11/16/2018 04:00 AM  
 
Lab Results Component Value Date/Time CK 25 (L) 11/13/2018 03:26 PM  
 CK-MB Index 4.4 (H) 11/13/2018 03:26 PM  
 Troponin-I, Qt. <0.05 11/13/2018 03:26 PM  
 
No results found for: APTT Lab Results Component Value Date/Time INR 1.1 10/17/2018 12:53 PM  
 Prothrombin time 11.2 10/17/2018 12:53 PM  
 
No results found for: BNP, BNPP, XBNPT Herbert Cabello MD

## 2018-11-16 NOTE — PROGRESS NOTES
0900-Responded to RRT at this time; pt found to be resting in bed, complaining of a mild discomfort in her left arm and in new-onset AFib with RVR. EKG performed to confirm. Pt will transfer to ICU for diltiazem bolus and drip. Avelina AJ 
0934-Pt transferred to ICU at this time. TRANSFER - IN REPORT: 
Verbal report received from Guillermina Saavedra RN(name) on Billy Akins  being received from 5th floor, remote telemetry(unit) for urgent transfer  Report consisted of patients Situation, Background, Assessment and Recommendations(SBAR). Information from the following report(s) SBAR, Kardex, Procedure Summary, Intake/Output, MAR, Recent Results and Cardiac Rhythm AFib with RVR was reviewed with the receiving nurse. Opportunity for questions and clarification was provided. Primary Nurse Radah Little RN and Casey Fields RN performed a dual skin assessment on this patient No impairment noted aside from some LE bruising. Darrion score is 19. AVINASH Hamilton RN 
0965-Diltiazem bolus given and drip started after new PIV placed due to infiltration of previous line. Will monitor for effect. Avelina AJ 
0906-Dr. Ibarra Sessions on unit; order received to increase diltiazem drip to 15mg/hr; order edited to be able to titrate. Will also administer digoxin 250mcg IV X 1 now. HR remains generally 130-150's. Pt denies pain or other distress after receiving the initial dilt bolus. Will continue to monitor. Avelina AJ 
1122-Pt observed to convert from AFib RVR to Sinus tach at this time. Pt remains without complaint. Continue to monitor. Avelina AJ 
8409-Cardiology informed of the above; anticipate increase in coreg dosing. Will stop diltiazem drip with evening coreg dose. Interdisciplinary rounds completed; will restart plavix and xarelto. Kyphoplasty likely on hold for the foreseeable future.  Avelina AJ 
4117-Shift Summary: The patient has been stable since approx 1130 this AM. Converted from AFib with RVR to NSR/ST at that time and has remained in a normal rhythm. Diltiazem stopped when coreg given at 1711. Has only complained of mild to moderate back pain; received Percocet X 2. Plavix and Xarelto resumed today. She ate well and had a large BM. Adequate urine out. Kyphoplasty on hold. Pt resting well at this time. Avelina AJ 
1910-Bedside and Verbal shift change report given to AVINASH Freeman RN (oncoming nurse) by AVINASH Hamilton RN (offgoing nurse). Report included the following information SBAR, Kardex, Procedure Summary, Intake/Output, MAR, Recent Results and Cardiac Rhythm SR with BBB.  Avelina AJ

## 2018-11-16 NOTE — PROGRESS NOTES
Name: Anabelle Kerns: [address]  
: 1945 Admit Date: 2018 Phone: [phone]  Room: 4/ PCP: Vanda House MD  MRN: [id number] Date: 2018  Code: Full Code Chart and notes reviewed. Data reviewed. I review the patient's current medications in the medical record at each encounter. I have evaluated and examined the patient. Overnight events Afebrile BP elevated, in Afib with RVR this morning Sats 97% on RA 
RVP negative V/Q scan low probability MRI with acute T7 vertebral compression deformity with greater than 50% loss of vertebral body height. Mild cortical retropulsion at T7 with mild to moderate canal stenosis at T7. There is marrow edema along the anterior inferior endplate of T6. ROS: Rapid response call this morning for afib with RVR. Reports palpitations and chest tightness. No change in shortness of breath. No f/c. Was planned for kyphoplasty but this is on hold as she is on plavix for cardiac stents. Past Medical History:  
Diagnosis Date  Asthma  Autoimmune disease ([facility]   
 fibromyalgia  CAD (coronary artery disease) 10/9/2015  Closed wedge compression fracture of T7 vertebra ([facility] 2018  Diabetes ([facility]  DVT (deep vein thrombosis) in pregnancy ([facility]  GERD (gastroesophageal reflux disease)  HTN (hypertension)  NSTEMI (non-ST elevated myocardial infarction) ([facility] 10/9/2015  Obesity (BMI 30-39.9) 2018  Sleep apnea   
 wears CPAP Past Surgical History:  
Procedure Laterality Date  ABDOMEN SURGERY PROC UNLISTED    
 hital hernia repair, gall bladder removed  BREAST SURGERY PROCEDURE UNLISTED    
 lumpectomy  CARDIAC SURG PROCEDURE UNLIST  10/9/15  
 2 stents [facility]  HX COLOSTOMY    
 and reversal, post episiotomy repair [facility]  [facility] UROLOGICAL  2009  
 bladder sling Family History Problem Relation Age of Onset  Diabetes Mother  Heart Failure Mother  Kidney Disease Mother  Diabetes Father  Heart Disease Father  Elevated Lipids Father  Heart Failure Father  Heart Disease Brother  Cancer Brother   
     kidney  Diabetes Brother  No Known Problems Brother  No Known Problems Brother Social History Tobacco Use  Smoking status: Former Smoker Last attempt to quit: 3/30/2017 Years since quittin.6  Smokeless tobacco: Never Used Substance Use Topics  Alcohol use: No  
  Alcohol/week: 0.0 oz  
  Comment: very very rarely Allergies Allergen Reactions  Advair Diskus [Fluticasone-Salmeterol] Rash  Aspartame Other (comments)  Ciprofloxacin Rash  Statins-Hmg-Coa Reductase Inhibitors Other (comments) Muscle pain Lipitor/crestor/zocor  Sulfa (Sulfonamide Antibiotics) Rash  Tetracycline Other (comments) musclepain  Zetia [Ezetimibe] Myalgia Current Facility-Administered Medications Medication Dose Route Frequency  predniSONE (DELTASONE) tablet 20 mg  20 mg Oral BID WITH MEALS  dilTIAZem (CARDIZEM) 125 mg in dextrose 5% 125 mL infusion  10 mg/hr IntraVENous TITRATE  
 0.9% sodium chloride infusion  75 mL/hr IntraVENous CONTINUOUS  
 arformoterol (BROVANA) neb solution 15 mcg  15 mcg Nebulization BID RT  
 budesonide (PULMICORT) 500 mcg/2 ml nebulizer suspension  500 mcg Nebulization BID RT  
 albuterol-ipratropium (DUO-NEB) 2.5 MG-0.5 MG/3 ML  3 mL Nebulization Q6H RT  
 carvedilol (COREG) tablet 6.25 mg  6.25 mg Oral BID WITH MEALS  DULoxetine (CYMBALTA) capsule 60 mg  60 mg Oral DAILY  isosorbide mononitrate ER (IMDUR) tablet 30 mg  30 mg Oral DAILY  lisinopril (PRINIVIL, ZESTRIL) tablet 5 mg  5 mg Oral DAILY  montelukast (SINGULAIR) tablet 10 mg  10 mg Oral DAILY  sAXagliptin (ONGLYZA) tablet 5 mg  5 mg Oral DAILY  sodium chloride (NS) flush 5-10 mL  5-10 mL IntraVENous Q8H  
 sodium chloride (NS) flush 5-10 mL  5-10 mL IntraVENous PRN  
 acetaminophen (TYLENOL) tablet 650 mg  650 mg Oral Q4H PRN  
 oxyCODONE-acetaminophen (PERCOCET) 5-325 mg per tablet 1 Tab  1 Tab Oral Q4H PRN  
 HYDROmorphone (PF) (DILAUDID) injection 0.5 mg  0.5 mg IntraVENous Q4H PRN  prochlorperazine (COMPAZINE) injection 10 mg  10 mg IntraVENous Q6H PRN  
 zolpidem (AMBIEN) tablet 5 mg  5 mg Oral QHS PRN  
 insulin lispro (HUMALOG) injection   SubCUTAneous QID WITH MEALS  glucose chewable tablet 16 g  4 Tab Oral PRN  
 dextrose (D50W) injection syrg 12.5-25 g  25-50 mL IntraVENous PRN  
 glucagon (GLUCAGEN) injection 1 mg  1 mg IntraMUSCular PRN  pantoprazole (PROTONIX) tablet 40 mg  40 mg Oral ACB&D REVIEW OF SYSTEMS  
12 point ROS negative except as stated in the HPI. Physical Exam:  
Visit Vitals BP (!) 159/108 (BP 1 Location: Left arm, BP Patient Position: At rest) Pulse (!) 143 Temp 97.8 °F (36.6 °C) Resp 20 Ht 5' 7\" (1.702 m) Wt 110.2 kg (243 lb) SpO2 97% Breastfeeding? No  
BMI 38.06 kg/m² General:  Alert, cooperative, no acute distress, appears stated age. Head:  Normocephalic, without obvious abnormality, atraumatic. Eyes:  Conjunctivae/corneas clear. Nose: Nares normal. Septum midline. Mucosa normal.   
Throat: Lips, mucosa, and tongue normal.   
Neck: Supple, symmetrical, trachea midline, no adenopathy. Lungs:   Diminished but mostly clear. No wheeze or rales. Chest wall:  No tenderness or deformity. Heart:  Regular rate and rhythm, S1, S2 normal, no murmur, click, rub or gallop. Abdomen:   Obese, soft, non-tender. Bowel sounds normal. No masses,  No organomegaly. Extremities: Extremities normal, atraumatic, no cyanosis or edema. Pulses: 2+ and symmetric all extremities. Skin: Skin color, texture, turgor normal. No rashes or lesions Lymph nodes: Cervical, supraclavicular nodes normal.  
Neurologic: Grossly nonfocal  
 
 
Lab Results Component Value Date/Time Sodium 139 11/16/2018 04:00 AM  
 Potassium 4.9 11/16/2018 04:00 AM  
 Chloride 106 11/16/2018 04:00 AM  
 CO2 23 11/16/2018 04:00 AM  
 BUN 30 (H) 11/16/2018 04:00 AM  
 Creatinine 0.93 11/16/2018 04:00 AM  
 Glucose 227 (H) 11/16/2018 04:00 AM  
 Calcium 8.4 (L) 11/16/2018 04:00 AM  
 Magnesium 2.2 11/16/2018 04:00 AM  
 Lactic acid 1.4 06/28/2018 04:22 AM  
 
 
Lab Results Component Value Date/Time WBC 10.7 11/16/2018 04:00 AM  
 HGB 12.1 11/16/2018 04:00 AM  
 PLATELET 188 66/49/6682 04:00 AM  
 MCV 94.9 11/16/2018 04:00 AM  
 
 
Lab Results Component Value Date/Time INR 1.1 10/17/2018 12:53 PM  
 AST (SGOT) 37 11/16/2018 04:00 AM  
 Alk. phosphatase 125 (H) 11/16/2018 04:00 AM  
 Protein, total 5.8 (L) 11/16/2018 04:00 AM  
 Albumin 2.5 (L) 11/16/2018 04:00 AM  
 Globulin 3.3 11/16/2018 04:00 AM  
 
 
No results found for: IRON, FE, TIBC, IBCT, PSAT, FERR Lab Results Component Value Date/Time TSH 0.58 11/13/2018 03:26 PM  
  
 
No results found for: PH, PHI, PCO2, PCO2I, PO2, PO2I, HCO3, HCO3I, FIO2, FIO2I Lab Results Component Value Date/Time CK 25 (L) 11/13/2018 03:26 PM  
 CK-MB Index 4.4 (H) 11/13/2018 03:26 PM  
 Troponin-I, Qt. <0.05 11/13/2018 03:26 PM  
  
 
Lab Results Component Value Date/Time Culture result: ESCHERICHIA COLI (PREDOMINATING) (A) 06/28/2018 07:01 AM  
 Culture result: ENTEROBACTER AEROGENES (A) 06/28/2018 07:01 AM  
 Culture result: (A) 06/28/2018 04:13 AM  
  ENTEROBACTER AEROGENES GROWING IN 1 OF 4 BOTTLES DRAWN (SITE=L UPPER ARM) Culture result: (A) 06/28/2018 04:13 AM  
  PRELIMINARY REPORT OF 
GRAM NEGATIVE COCCOBACILLI 
GROWING IN 1 OF 4 BOTTLES DRAWN 
CALLED TO AND READ BACK BY 
CHANG HUNTLEY RN [facility] AT 3085 ON 6/28/2018. [address]  Culture result: REMAINING BOTTLE(S) HAS/HAVE NO GROWTH IN 5 DAYS 06/28/2018 04:13 AM  
 
 
No results found for: TOXA1, RPR, HBCM, HBSAG, HAAB, HCAB1, HAAT, G6PD, CRYAC, HIVGT, HIVR, HIV1, HIV12, HIVPC, HIVRPI Lab Results Component Value Date/Time CK 25 (L) 11/13/2018 03:26 PM  
 
 
Lab Results Component Value Date/Time Color YELLOW/STRAW 11/13/2018 07:58 PM  
 Appearance CLOUDY (A) 11/13/2018 07:58 PM  
 Specific gravity 1.020 04/16/2017 01:13 PM  
 pH (UA) 5.0 11/13/2018 07:58 PM  
 Protein TRACE (A) 11/13/2018 07:58 PM  
 Glucose >1,000 (A) 11/13/2018 07:58 PM  
 Ketone NEGATIVE  11/13/2018 07:58 PM  
 Bilirubin NEGATIVE  11/13/2018 07:58 PM  
 Blood TRACE (A) 11/13/2018 07:58 PM  
 Urobilinogen 0.2 11/13/2018 07:58 PM  
 Nitrites NEGATIVE  11/13/2018 07:58 PM  
 Leukocyte Esterase MODERATE (A) 11/13/2018 07:58 PM  
 WBC  11/13/2018 07:58 PM  
 RBC 0-5 11/13/2018 07:58 PM  
 Bacteria 4+ (A) 11/13/2018 07:58 PM  
 
 
IMPRESSION 
· COPD exacerbation · Shortness of breath · Pulmonary hypertension, likely secondary to underlying obstructive lung disease and HAYES, but does have a history of CTD · History of DVT, on Xarelto · NAIDA · Closed wedge compression fracture of T7 vertebra · Afib with RVR PLAN 
· Dilt 10mg IV now and to start on a drip as per cardioloy · Transfer to the ICU for closer observation · Goal sats >90% · Duonebs; brovana/pulmicort · Singulair · Transitioned to PO prednisone · No indication for antibiotics at this time · Gentle IVFs; trend creat · Cardiac diet · Ortho following, kyphoplasty on hold as she is on plavix for stents · Will need to consider treatment of PH if symptoms persist 
· CPAP nightly and with naps for known HAYES · PPI 
· On xarelto chronically, plavix for stents Coty Ayala MD

## 2018-11-16 NOTE — ROUTINE PROCESS
Bedside shift change report given to Nazia Carbajal (oncoming nurse) by Harinder Montano  (offgoing nurse). Report included the following information SBAR, Kardex, Intake/Output, MAR and Procedure Verification. CHG and pt kept NPO after midnight.

## 2018-11-16 NOTE — PROGRESS NOTES
Per Dr. Trejo Poag is cancelled for medical reason. Notified Autumn of the above information. Dr. Manuela Turner did not notify orthopedics, Dr. Mervat Buck is aware. Called and notified Autumn of the above information.

## 2018-11-16 NOTE — PROGRESS NOTES
Orthopedics aware of patient transfer to the ICU due to a-fib with RVR. Patient was scheduled for a kyphoplasty today. Spoke with IR and patient likely would not have been completed today due to the schedule. Kyphoplasty is obviously on hold now due to a-fib. If available to do this weekend; I would recommend transfer to [facility] to complete or speak with IR about possibility of doing here this weekend. This is highly unlikely though. SALIMA Galarza-C Orthopaedic Surgery  [facility]

## 2018-11-16 NOTE — PROGRESS NOTES
Spoke with Dr. Richardson Heller regarding pending Kyphoplasty procedure for today. Anesthesia is available in the afternoon to perform procedure. Dr Richardson Heller advised that he can not do the procedure in the afternoon, in the morning only. The procedure will be performed on Monday around 12:30pm. Anesthesia is able to be involved with procedure at that time. Information provided to Anesthesia, Dr. Caren Kirk. Anesthesia will go and evaluate patient for procedure on Monday. Case scheduled with IR and Anesthesia for Monday. Dr. Richardson Heller informed that patient's last dose of Plavix was yesterday at 1213 and last dose of Xarelto was on 11/13/18. He was fine with those dates. Called and spoke with Jina Erickson RN on the 5th floor regarding patient. Advised that procedure would not be performed today. It is important to continue to hold Xarelto and Plavix till after the procedure. Advised patient could receive Heparin or Lovenox SQ up until Sunday Morning but nothing after that. I advised that would I remove the NPO diet order and then restart the NPO status on Sunday night at [address] [place] [address]. She was informed that the plan is to bring the patient down to recovery around 9:00-9:30, depending on what Anesthesia's plan is with patient. Jina Erickson RN advised that she understood and would inform the patient. Liz advised that she had no further questions but if she did think of anything, then would reach out to IR with them. Spoke to Dr. Irma Valle at 9261, informed him that Plavix and Xarelto have been stopped. Dr. Irma Valle inquired as to whether Cardiology had been spoken too. Advised that not sure, there is a consult placed. He wants to make sure Cardiology is ok with patient coming off Plavix because of recent stent placed in June. Lennox Batters, RN she advised that she doesn't think that Cards has been to see her yet. Per Dr. Irma Valle levels will be T6 and T7. Jina Erickson RN updated on the additional levels and has no further questions. 2260- spoke with Dr. Isaias Guthrie regarding patient; He advised that patient is currently being moved to ICU. He wants her to go back on medications for now. Procedure is on hold indefinitely. Dr Satinder Fuentes made aware of patient being transferred to ICU. Attempted to call Dr. Joseline Gilbert not available, requested to have a call back.

## 2018-11-17 LAB
ALBUMIN SERPL-MCNC: 2.6 G/DL (ref 3.5–5)
ALBUMIN/GLOB SERPL: 0.9 {RATIO} (ref 1.1–2.2)
ALP SERPL-CCNC: 127 U/L (ref 45–117)
ALT SERPL-CCNC: 57 U/L (ref 12–78)
ANION GAP SERPL CALC-SCNC: 9 MMOL/L (ref 5–15)
AST SERPL-CCNC: 16 U/L (ref 15–37)
BASOPHILS # BLD: 0 K/UL (ref 0–0.1)
BASOPHILS NFR BLD: 0 % (ref 0–1)
BILIRUB SERPL-MCNC: 0.4 MG/DL (ref 0.2–1)
BUN SERPL-MCNC: 30 MG/DL (ref 6–20)
BUN/CREAT SERPL: 29 (ref 12–20)
CALCIUM SERPL-MCNC: 8.1 MG/DL (ref 8.5–10.1)
CHLORIDE SERPL-SCNC: 106 MMOL/L (ref 97–108)
CO2 SERPL-SCNC: 26 MMOL/L (ref 21–32)
CREAT SERPL-MCNC: 1.03 MG/DL (ref 0.55–1.02)
DIFFERENTIAL METHOD BLD: ABNORMAL
EOSINOPHIL # BLD: 0 K/UL (ref 0–0.4)
EOSINOPHIL NFR BLD: 0 % (ref 0–7)
ERYTHROCYTE [DISTWIDTH] IN BLOOD BY AUTOMATED COUNT: 15.8 % (ref 11.5–14.5)
GLOBULIN SER CALC-MCNC: 2.9 G/DL (ref 2–4)
GLUCOSE BLD STRIP.AUTO-MCNC: 184 MG/DL (ref 65–100)
GLUCOSE BLD STRIP.AUTO-MCNC: 217 MG/DL (ref 65–100)
GLUCOSE BLD STRIP.AUTO-MCNC: 226 MG/DL (ref 65–100)
GLUCOSE BLD STRIP.AUTO-MCNC: 257 MG/DL (ref 65–100)
GLUCOSE SERPL-MCNC: 258 MG/DL (ref 65–100)
HCT VFR BLD AUTO: 38.9 % (ref 35–47)
HGB BLD-MCNC: 12.1 G/DL (ref 11.5–16)
IMM GRANULOCYTES # BLD: 0 K/UL
IMM GRANULOCYTES NFR BLD AUTO: 0 %
LYMPHOCYTES # BLD: 0.3 K/UL (ref 0.8–3.5)
LYMPHOCYTES NFR BLD: 3 % (ref 12–49)
MAGNESIUM SERPL-MCNC: 2.2 MG/DL (ref 1.6–2.4)
MCH RBC QN AUTO: 29.4 PG (ref 26–34)
MCHC RBC AUTO-ENTMCNC: 31.1 G/DL (ref 30–36.5)
MCV RBC AUTO: 94.6 FL (ref 80–99)
METAMYELOCYTES NFR BLD MANUAL: 1 %
MONOCYTES # BLD: 0.2 K/UL (ref 0–1)
MONOCYTES NFR BLD: 2 % (ref 5–13)
NEUTS BAND NFR BLD MANUAL: 3 % (ref 0–6)
NEUTS SEG # BLD: 9.3 K/UL (ref 1.8–8)
NEUTS SEG NFR BLD: 91 % (ref 32–75)
NRBC # BLD: 0 K/UL (ref 0–0.01)
NRBC BLD-RTO: 0 PER 100 WBC
PLATELET # BLD AUTO: 157 K/UL (ref 150–400)
PMV BLD AUTO: 10.2 FL (ref 8.9–12.9)
POTASSIUM SERPL-SCNC: 4.6 MMOL/L (ref 3.5–5.1)
PROT SERPL-MCNC: 5.5 G/DL (ref 6.4–8.2)
RBC # BLD AUTO: 4.11 M/UL (ref 3.8–5.2)
RBC MORPH BLD: ABNORMAL
SERVICE CMNT-IMP: ABNORMAL
SODIUM SERPL-SCNC: 141 MMOL/L (ref 136–145)
WBC # BLD AUTO: 9.9 K/UL (ref 3.6–11)

## 2018-11-17 PROCEDURE — 5A09357 ASSISTANCE WITH RESPIRATORY VENTILATION, LESS THAN 24 CONSECUTIVE HOURS, CONTINUOUS POSITIVE AIRWAY PRESSURE: ICD-10-PCS | Performed by: FAMILY MEDICINE

## 2018-11-17 PROCEDURE — 77030032490 HC SLV COMPR SCD KNE COVD -B

## 2018-11-17 PROCEDURE — 94660 CPAP INITIATION&MGMT: CPT

## 2018-11-17 PROCEDURE — 74011000250 HC RX REV CODE- 250: Performed by: INTERNAL MEDICINE

## 2018-11-17 PROCEDURE — 83735 ASSAY OF MAGNESIUM: CPT

## 2018-11-17 PROCEDURE — 74011250637 HC RX REV CODE- 250/637: Performed by: PHYSICIAN ASSISTANT

## 2018-11-17 PROCEDURE — 74011250637 HC RX REV CODE- 250/637: Performed by: INTERNAL MEDICINE

## 2018-11-17 PROCEDURE — 36415 COLL VENOUS BLD VENIPUNCTURE: CPT

## 2018-11-17 PROCEDURE — 82962 GLUCOSE BLOOD TEST: CPT

## 2018-11-17 PROCEDURE — 85025 COMPLETE CBC W/AUTO DIFF WBC: CPT

## 2018-11-17 PROCEDURE — 74011636637 HC RX REV CODE- 636/637: Performed by: FAMILY MEDICINE

## 2018-11-17 PROCEDURE — 77010033678 HC OXYGEN DAILY

## 2018-11-17 PROCEDURE — 77030038269 HC DRN EXT URIN PURWCK BARD -A

## 2018-11-17 PROCEDURE — 74011250636 HC RX REV CODE- 250/636: Performed by: SPECIALIST

## 2018-11-17 PROCEDURE — 77030018846 HC SOL IRR STRL H20 ICUM -A

## 2018-11-17 PROCEDURE — 80053 COMPREHEN METABOLIC PANEL: CPT

## 2018-11-17 PROCEDURE — 74011250637 HC RX REV CODE- 250/637: Performed by: NURSE PRACTITIONER

## 2018-11-17 PROCEDURE — 94640 AIRWAY INHALATION TREATMENT: CPT

## 2018-11-17 PROCEDURE — 65660000000 HC RM CCU STEPDOWN

## 2018-11-17 RX ORDER — CARVEDILOL 12.5 MG/1
12.5 TABLET ORAL ONCE
Status: COMPLETED | OUTPATIENT
Start: 2018-11-17 | End: 2018-11-17

## 2018-11-17 RX ORDER — ENOXAPARIN SODIUM 100 MG/ML
100 INJECTION SUBCUTANEOUS EVERY 12 HOURS
Status: DISCONTINUED | OUTPATIENT
Start: 2018-11-17 | End: 2018-11-18

## 2018-11-17 RX ORDER — CARVEDILOL 12.5 MG/1
25 TABLET ORAL 2 TIMES DAILY WITH MEALS
Status: DISCONTINUED | OUTPATIENT
Start: 2018-11-17 | End: 2018-11-28

## 2018-11-17 RX ADMIN — Medication 10 ML: at 16:11

## 2018-11-17 RX ADMIN — BUDESONIDE 500 MCG: 0.5 INHALANT RESPIRATORY (INHALATION) at 07:49

## 2018-11-17 RX ADMIN — PREDNISONE 20 MG: 20 TABLET ORAL at 08:22

## 2018-11-17 RX ADMIN — IPRATROPIUM BROMIDE AND ALBUTEROL SULFATE 3 ML: .5; 3 SOLUTION RESPIRATORY (INHALATION) at 07:49

## 2018-11-17 RX ADMIN — INSULIN LISPRO 4 UNITS: 100 INJECTION, SOLUTION INTRAVENOUS; SUBCUTANEOUS at 12:19

## 2018-11-17 RX ADMIN — PANTOPRAZOLE SODIUM 40 MG: 40 TABLET, DELAYED RELEASE ORAL at 16:11

## 2018-11-17 RX ADMIN — ISOSORBIDE MONONITRATE 30 MG: 30 TABLET, EXTENDED RELEASE ORAL at 08:22

## 2018-11-17 RX ADMIN — OXYCODONE AND ACETAMINOPHEN 1 TABLET: 5; 325 TABLET ORAL at 22:23

## 2018-11-17 RX ADMIN — INSULIN LISPRO 3 UNITS: 100 INJECTION, SOLUTION INTRAVENOUS; SUBCUTANEOUS at 08:22

## 2018-11-17 RX ADMIN — PREDNISONE 20 MG: 20 TABLET ORAL at 16:47

## 2018-11-17 RX ADMIN — MONTELUKAST SODIUM 10 MG: 10 TABLET, COATED ORAL at 08:22

## 2018-11-17 RX ADMIN — BUDESONIDE 500 MCG: 0.5 INHALANT RESPIRATORY (INHALATION) at 20:31

## 2018-11-17 RX ADMIN — CARVEDILOL 12.5 MG: 12.5 TABLET, FILM COATED ORAL at 09:01

## 2018-11-17 RX ADMIN — CLOPIDOGREL BISULFATE 75 MG: 75 TABLET ORAL at 08:22

## 2018-11-17 RX ADMIN — INSULIN LISPRO 7 UNITS: 100 INJECTION, SOLUTION INTRAVENOUS; SUBCUTANEOUS at 16:47

## 2018-11-17 RX ADMIN — IPRATROPIUM BROMIDE AND ALBUTEROL SULFATE 3 ML: .5; 3 SOLUTION RESPIRATORY (INHALATION) at 20:31

## 2018-11-17 RX ADMIN — SAXAGLIPTIN 5 MG: 5 TABLET, FILM COATED ORAL at 08:22

## 2018-11-17 RX ADMIN — INSULIN LISPRO 2 UNITS: 100 INJECTION, SOLUTION INTRAVENOUS; SUBCUTANEOUS at 22:09

## 2018-11-17 RX ADMIN — IPRATROPIUM BROMIDE AND ALBUTEROL SULFATE 3 ML: .5; 3 SOLUTION RESPIRATORY (INHALATION) at 13:40

## 2018-11-17 RX ADMIN — ENOXAPARIN SODIUM 100 MG: 100 INJECTION SUBCUTANEOUS at 22:09

## 2018-11-17 RX ADMIN — CARVEDILOL 25 MG: 12.5 TABLET, FILM COATED ORAL at 16:47

## 2018-11-17 RX ADMIN — PANTOPRAZOLE SODIUM 40 MG: 40 TABLET, DELAYED RELEASE ORAL at 08:09

## 2018-11-17 RX ADMIN — IPRATROPIUM BROMIDE AND ALBUTEROL SULFATE 3 ML: .5; 3 SOLUTION RESPIRATORY (INHALATION) at 00:00

## 2018-11-17 RX ADMIN — LISINOPRIL 5 MG: 5 TABLET ORAL at 08:22

## 2018-11-17 RX ADMIN — CARVEDILOL 12.5 MG: 12.5 TABLET, FILM COATED ORAL at 08:22

## 2018-11-17 RX ADMIN — Medication 10 ML: at 22:10

## 2018-11-17 RX ADMIN — DULOXETINE 60 MG: 30 CAPSULE, DELAYED RELEASE ORAL at 08:22

## 2018-11-17 NOTE — PROGRESS NOTES
1900 received bedside SBAR report from off going nurse, Abbi Hamilton RN. Plan of care reviewed. Patient resting without difficulty. Denies pain/discomort at present time. Assessment to follow. 1930 assessment completed. See flow sheet. 2330 patient reassessed. No new changes noted. 0330 patient reassessed. No new changes noted. 0700 Bedside and Verbal shift change report given to Abbi Hamilton RN (oncoming nurse) by [facility]. Report included the following information SBAR, Kardex, Intake/Output, MAR, Recent Results and Cardiac Rhythm NSR. Shift Summary: 
Patient has had an uneventful shift. Bathed,gown and linen changed. Slept well this shift.

## 2018-11-17 NOTE — PROGRESS NOTES
Sharie Romberg, MD. [facility] Patient: Yue Vega : 1945 Today's Date: 2018 CARDIOLOGY PROGRESS NOTE 
S: Laying comfortably. Breathing OK. No CP. O: 
 
Physical Exam: 
Visit Vitals /79 Pulse (!) 103 Temp 97.8 °F (36.6 °C) Resp 17 Ht 5' 7\" (1.702 m) Wt 243 lb 13.3 oz (110.6 kg) SpO2 99% Breastfeeding? No  
BMI 39.35 kg/m² Patient appears generally well, mood and affect are appropriate and pleasant. HEENT:  Hearing intact, non-icteric, normocephalic, atraumatic. Neck Exam: Supple Lung Exam: Clear to auscultation, even breath sounds. Cardiac Exam: Regular rate and rhythm with no murmur Abdomen: Soft, non-tender, + obese Extremities: MAW, No lower extremity edema. Psych: Appropriate affect Neuro - Grossly intact Review of Symptoms: 
Constitutional: Negative for fever HEENT: Negative for vision changes. Respiratory: Negative for productive cough Cardiovascular: Negative for syncope Gastrointestinal: Negative for abdominal pain, melena Genitourinary: Negative for dysuria Skin: Negative for rash Heme: No problems bleeding. Neuro - no speech changes or focal weaknesses Intake/Output Summary (Last 24 hours) at 2018 1336 Last data filed at 2018 7733 Gross per 24 hour Intake 2576 ml Output 900 ml Net 1676 ml  
 
 
 
 
LABS / OTHER STUDIES:  
 
Recent Results (from the past 24 hour(s)) GLUCOSE, POC Collection Time: 18  5:04 PM  
Result Value Ref Range Glucose (POC) 317 (H) 65 - 100 mg/dL Performed by Rhianna Banerjee GLUCOSE, POC Collection Time: 18  9:14 PM  
Result Value Ref Range Glucose (POC) 276 (H) 65 - 100 mg/dL Performed by Nitza Randle Collection Time: 18  4:28 AM  
Result Value Ref Range Magnesium 2.2 1.6 - 2.4 mg/dL CBC WITH AUTOMATED DIFF Collection Time: 18  4:28 AM  
Result Value Ref Range WBC 9.9 3.6 - 11.0 K/uL  
 RBC 4.11 3.80 - 5.20 M/uL  
 HGB 12.1 11.5 - 16.0 g/dL HCT 38.9 35.0 - 47.0 % MCV 94.6 80.0 - 99.0 FL  
 MCH 29.4 26.0 - 34.0 PG  
 MCHC 31.1 30.0 - 36.5 g/dL  
 RDW 15.8 (H) 11.5 - 14.5 % PLATELET 454 904 - 264 K/uL MPV 10.2 8.9 - 12.9 FL  
 NRBC 0.0 0  WBC ABSOLUTE NRBC 0.00 0.00 - 0.01 K/uL NEUTROPHILS 91 (H) 32 - 75 % BAND NEUTROPHILS 3 0 - 6 % LYMPHOCYTES 3 (L) 12 - 49 % MONOCYTES 2 (L) 5 - 13 % EOSINOPHILS 0 0 - 7 % BASOPHILS 0 0 - 1 % METAMYELOCYTES 1 (H) 0 % IMMATURE GRANULOCYTES 0 %  
 ABS. NEUTROPHILS 9.3 (H) 1.8 - 8.0 K/UL  
 ABS. LYMPHOCYTES 0.3 (L) 0.8 - 3.5 K/UL  
 ABS. MONOCYTES 0.2 0.0 - 1.0 K/UL  
 ABS. EOSINOPHILS 0.0 0.0 - 0.4 K/UL  
 ABS. BASOPHILS 0.0 0.0 - 0.1 K/UL  
 ABS. IMM. GRANS. 0.0 K/UL  
 DF MANUAL    
 RBC COMMENTS NORMOCYTIC, NORMOCHROMIC METABOLIC PANEL, COMPREHENSIVE Collection Time: 11/17/18  4:28 AM  
Result Value Ref Range Sodium 141 136 - 145 mmol/L Potassium 4.6 3.5 - 5.1 mmol/L Chloride 106 97 - 108 mmol/L  
 CO2 26 21 - 32 mmol/L Anion gap 9 5 - 15 mmol/L Glucose 258 (H) 65 - 100 mg/dL BUN 30 (H) 6 - 20 MG/DL Creatinine 1.03 (H) 0.55 - 1.02 MG/DL  
 BUN/Creatinine ratio 29 (H) 12 - 20 GFR est AA >60 >60 ml/min/1.73m2 GFR est non-AA 53 (L) >60 ml/min/1.73m2 Calcium 8.1 (L) 8.5 - 10.1 MG/DL Bilirubin, total 0.4 0.2 - 1.0 MG/DL  
 ALT (SGPT) 57 12 - 78 U/L  
 AST (SGOT) 16 15 - 37 U/L Alk. phosphatase 127 (H) 45 - 117 U/L Protein, total 5.5 (L) 6.4 - 8.2 g/dL Albumin 2.6 (L) 3.5 - 5.0 g/dL Globulin 2.9 2.0 - 4.0 g/dL A-G Ratio 0.9 (L) 1.1 - 2.2 GLUCOSE, POC Collection Time: 11/17/18  8:11 AM  
Result Value Ref Range Glucose (POC) 184 (H) 65 - 100 mg/dL Performed by Karen Blankenship GLUCOSE, POC Collection Time: 11/17/18 12:12 PM  
Result Value Ref Range Glucose (POC) 226 (H) 65 - 100 mg/dL Performed by Zach Burks CARDIAC DIAGNOSTICS:  
 
Cardiac Evaluation Includes: 
 
Cath -1/15 -  PCI to RCA with BMS ( NSTEMI 10/2015); Cath 6/6/18- LAD Stent Resolute(SAMUEL/D1 PTCA (at [address]) Echo 6/18 - TDS. LVEF 60% Cath 10/19/18- patent stents, EF 60% ASSESSMENT AND PLAN:  
 
Assessment and Plan: Ms. Maverick Guzmán is a 28-year-old  female with multiple medical problems including CAD, asthma, chronic dyspnea, CKD, obesity, on chronic anticoagulation for recurrent DVT who is admitted for dyspnea/back pain. Diagnosed to have vertebral fracture. Scheduled for kyphoplasty. Cardiology consulted to assist with medications since patient has been on Plavix/Xarelto prior to admission. History of stent placement in June 2018. She developed Afib with RVR this admission, but converted back to NSR. 1) CAD 
- s/p PCI to RCA with BMS ( NSTEMI 10/2015) and on 6/6/18- LAD Stent Resolute(SAMUEL/D1 PTCA (at [address]) - Given recent stenting within past 6 months, would prefer plavix continue uninterrupted if possible  
- on plavix, BB, chronic Xarelto (held for now). She is intolerant of statins. 2) Paroxysmal Afib 
- Newly discovered this admission - She has converted back to NSR 
- cont Coreg 
- OK to hold [address] for anticipated procedure and then resume afterwards. 3) History of DVT's (1998 and 2006) - Has been on chronic Xarelto  
- OK to hold Xarelto for surgery - will use Lovenox while in the hospital and switch to [address] back by discharge 4) HLD - intolerant to multiple statins sec to myalgia. Unable to afford PCSK9 Inhibitors 
- readdress as outpatient 5) Dyspnea  
- likely multifactorial (including chronic HF and COPD exacerbation) - On IV steroids. - her outpatient diuretics were held secondary to renal insufficiency. Will use PO Bumex PRN. - Breathing OK on 11/17/18  
 
7) Closed wedge compression fracture of T7 vertebra - kyphoplasty planned - she can proceed with her procedure anytime from a cardiac standpoint.  
- Given recent PCI, would like to plavix to continue uninterrupted if possible Mohan Ibrahim MD, [facility] [address], [address]  [place] [address] [address] [place] [id number] [place] [address] Ph: [phone]   Ph [phone]

## 2018-11-17 NOTE — PROGRESS NOTES
Orthopaedic Progress Note November 17, 2018 11:00 AM  
 
Patient: Sumanth Rm MRN: [id number]  SSN: [id number] YOB: 1945  Age: 81 y.o. Sex: female Admit date:  11/13/2018 Admitting Physician:  Dali Elise MD  
Consulting Physician(s): Treatment Team: Attending Provider: Marie Fleischer, MD; Consulting Provider: Meghann Crocker MD; Consulting Provider: Argenis Anderson MD; Hospitalist: Meghann Crocker MD; Consulting Provider: [facility]; Physician: Marie Fleischer, MD; Utilization Review: Ti Beth RN; Surgeon: Vance López MD; Consulting Provider: Ann Marie Colon MD; Care Manager: Freddie Lima 
 
SUBJECTIVE: 
  
Sumanth Rm is a 81 y.o. female is resting in bed this am.  She complains of mid back pain. She went into a-fib yesterday with RVR. Apparently she converted yesterday. She is now on dual anti-coagulation therapy. No complaints of nausea, vomiting, dizziness, lightheadedness, chest pain, or shortness of breath. OBJECTIVE: 
 
  
Physical Exam: 
General: Alert, cooperative, no distress. Respiratory: Respirations unlabored Neurological:  Neurovascular exam within normal limits. Motor: + DF/PF. Musculoskeletal: Calves soft, supple, non-tender upon palpation. Vital Signs:  
  
 
Patient Vitals for the past 8 hrs: 
 BP Temp Pulse Resp SpO2 Weight 11/17/18 0900 140/77  (!) 114 17 100 %   
11/17/18 0830 (!) 143/100  (!) 114 22 100 %   
11/17/18 0800 150/85 97.8 °F (36.6 °C) 85 11 96 %   
11/17/18 0749     97 %   
11/17/18 0730 150/80  88 15 95 %   
11/17/18 0700 160/86  88 15 95 %   
11/17/18 0653   93     
11/17/18 0639 145/84  82 14 95 %   
11/17/18 0600 155/82  92 17 96 %   
11/17/18 0530 170/87  100 15 92 %   
11/17/18 0500 132/73  91 14 96 %   
11/17/18 0430 133/77  80 12 96 %   
11/17/18 0425      110.6 kg (243 lb 13.3 oz) 18 0400 138/81  84 13    
18 0330 143/75 96.4 °F (35.8 °C) 85 14 96 %  Temp (24hrs), Av.7 °F (36.5 °C), Min:96.4 °F (35.8 °C), Max:98.4 °F (36.9 °C) Labs:  
  
 
Recent Labs 18 
1099 HCT 38.9 HGB 12.1 Lab Results Component Value Date/Time Sodium 141 2018 04:28 AM  
 Potassium 4.6 2018 04:28 AM  
 Chloride 106 2018 04:28 AM  
 CO2 26 2018 04:28 AM  
 Glucose 258 (H) 2018 04:28 AM  
 BUN 30 (H) 2018 04:28 AM  
 Creatinine 1.03 (H) 2018 04:28 AM  
 Calcium 8.1 (L) 2018 04:28 AM  
 
 
PT/OT:  
 
  
  
  
 
Patient mobility ASSESSMENT / PLAN:  
Principal Problem: 
  Dyspnea (2018) Active Problems: 
  Type II diabetes mellitus ([facility] (10/9/2015) Coronary artery disease involving native coronary artery without angina pectoris (2016) Essential hypertension (2016) Chronic anticoagulation (3/30/2018) ARF (acute renal failure) ([address] (2018) Obesity (BMI 30-39.9) (2018) Closed wedge compression fracture of T7 vertebra ([facility] [address] (2018) A: 1. T7 compression fracture with mild retropulsion causing mild stenosis centrally P: 1. I had a long discussion with the patient regarding treatment options for her back. The decision for kyphoplasty is now in the hands of cardiology and the primary team.  She will need to come off of anticoagulation for the kyphoplasty. This will need to be coordinated with IR as they have preferences for cessation of anticoagulation. She would certainly benefit from a kyphoplasty as she is debilitated and this would be palliative for her. For now I am going to get her fit for a TLSO and therapy can work with her, but she must have this on at all times if OOB.   Primary team to work with cardiology and IR for timing of kyphoplasty. Please consult me if you need help in anyway with this patient and care going forth. Signed By: 
SALIMA Garcia [id number] [facility]

## 2018-11-17 NOTE — PROGRESS NOTES
Name: [facility] [place]: [address] [place] [address] Dr HORNB: 1945 Admit Date: 2018 Phone: [phone]  Room: 72 [phone] PCP: Rock Omer MD  MRN: [id number] Date: 2018  Code: Full Code Chart and notes reviewed. Data reviewed. I review the patient's current medications in the medical record at each encounter. I have evaluated and examined the patient. Feeling better. No specific complaints. In sinus tach this morning, but reportedly an episode of afib this morning. ROS: Reviewed 10 system and negative except as per above. Past Medical History:  
Diagnosis Date  Asthma  Atrial fibrillation ([facility] [address] 2018  Autoimmune disease ([facility] [address]   
 fibromyalgia  CAD (coronary artery disease) 10/9/2015  Closed wedge compression fracture of T7 vertebra ([facility] [address] [facility] 2018  Diabetes ([facility] [address]  DVT (deep vein thrombosis) in pregnancy ([facility] [address]  GERD (gastroesophageal reflux disease)  HTN (hypertension)  NSTEMI (non-ST elevated myocardial infarction) ([facility] [address] 10/9/2015  Obesity (BMI 30-39.9) 2018  Sleep apnea   
 wears CPAP Past Surgical History:  
Procedure Laterality Date  ABDOMEN SURGERY PROC UNLISTED    
 hital hernia repair, gall bladder removed  BREAST SURGERY PROCEDURE UNLISTED    
 lumpectomy  CARDIAC SURG PROCEDURE UNLIST  10/9/15  
 2 stents [address] [facility] COLOSTOMY    
 and reversal, post episiotomy repair [address] UROLOGICAL  2009  
 bladder sling Family History Problem Relation Age of Onset  Diabetes Mother  Heart Failure Mother  Kidney Disease Mother  Diabetes Father  Heart Disease Father  Elevated Lipids Father  Heart Failure Father  Heart Disease Brother  Cancer Brother   
     kidney  Diabetes Brother  No Known Problems Brother  No Known Problems Brother Social History Tobacco Use  
  Smoking status: Former Smoker Last attempt to quit: 3/30/2017 Years since quittin.6  Smokeless tobacco: Never Used Substance Use Topics  Alcohol use: No  
  Alcohol/week: 0.0 oz  
  Comment: very very rarely Allergies Allergen Reactions  Advair Diskus [Fluticasone-Salmeterol] Rash  Aspartame Other (comments)  Ciprofloxacin Rash  Statins-Hmg-Coa Reductase Inhibitors Other (comments) Muscle pain Lipitor/crestor/zocor  Sulfa (Sulfonamide Antibiotics) Rash  Tetracycline Other (comments) musclepain  Zetia [Ezetimibe] Myalgia Current Facility-Administered Medications Medication Dose Route Frequency  enoxaparin (LOVENOX) injection 100 mg  100 mg SubCUTAneous Q12H  predniSONE (DELTASONE) tablet 20 mg  20 mg Oral BID WITH MEALS  carvedilol (COREG) tablet 12.5 mg  12.5 mg Oral BID WITH MEALS  clopidogrel (PLAVIX) tablet 75 mg  75 mg Oral DAILY  0.9% sodium chloride infusion  75 mL/hr IntraVENous CONTINUOUS  
 arformoterol (BROVANA) neb solution 15 mcg  15 mcg Nebulization BID RT  
 budesonide (PULMICORT) 500 mcg/2 ml nebulizer suspension  500 mcg Nebulization BID RT  
 albuterol-ipratropium (DUO-NEB) 2.5 MG-0.5 MG/3 ML  3 mL Nebulization Q6H RT  
 DULoxetine (CYMBALTA) capsule 60 mg  60 mg Oral DAILY  isosorbide mononitrate ER (IMDUR) tablet 30 mg  30 mg Oral DAILY  lisinopril (PRINIVIL, ZESTRIL) tablet 5 mg  5 mg Oral DAILY  montelukast (SINGULAIR) tablet 10 mg  10 mg Oral DAILY  sAXagliptin (ONGLYZA) tablet 5 mg  5 mg Oral DAILY  sodium chloride (NS) flush 5-10 mL  5-10 mL IntraVENous Q8H  
 sodium chloride (NS) flush 5-10 mL  5-10 mL IntraVENous PRN  
 acetaminophen (TYLENOL) tablet 650 mg  650 mg Oral Q4H PRN  
 oxyCODONE-acetaminophen (PERCOCET) 5-325 mg per tablet 1 Tab  1 Tab Oral Q4H PRN  
 HYDROmorphone (PF) (DILAUDID) injection 0.5 mg  0.5 mg IntraVENous Q4H PRN  
  prochlorperazine (COMPAZINE) injection 10 mg  10 mg IntraVENous Q6H PRN  
 zolpidem (AMBIEN) tablet 5 mg  5 mg Oral QHS PRN  
 insulin lispro (HUMALOG) injection   SubCUTAneous QID WITH MEALS  glucose chewable tablet 16 g  4 Tab Oral PRN  
 dextrose (D50W) injection syrg 12.5-25 g  25-50 mL IntraVENous PRN  
 glucagon (GLUCAGEN) injection 1 mg  1 mg IntraMUSCular PRN  pantoprazole (PROTONIX) tablet 40 mg  40 mg Oral ACB&D REVIEW OF SYSTEMS  
12 point ROS negative except as stated in the HPI. Physical Exam:  
Visit Vitals /86 Pulse 88 Temp 96.4 °F (35.8 °C) Resp 15 Ht 5' 7\" (1.702 m) Wt 110.6 kg (243 lb 13.3 oz) SpO2 97% Breastfeeding? No  
BMI 39.35 kg/m² General:  Alert, cooperative, no acute distress, appears stated age. Head:  Normocephalic, without obvious abnormality, atraumatic. Eyes:  Conjunctivae/corneas clear. Nose: Nares normal. Septum midline. Mucosa normal.   
Throat: Lips, mucosa, and tongue normal.   
Neck: Supple, symmetrical, trachea midline, no adenopathy. Lungs:   Diminished but mostly clear. No wheeze or rales. Chest wall:  No tenderness or deformity. Heart:  Regular rate and rhythm, S1, S2 normal, no murmur, click, rub or gallop. Abdomen:   Obese, soft, non-tender. Bowel sounds normal. No masses,  No organomegaly. Extremities: Extremities normal, atraumatic, no cyanosis or edema. Pulses: 2+ and symmetric all extremities. Skin: Skin color, texture, turgor normal. No rashes or lesions Lymph nodes: Cervical, supraclavicular nodes normal.  
Neurologic: Grossly nonfocal  
 
 
Lab Results Component Value Date/Time  Sodium 141 11/17/2018 04:28 AM  
 Potassium 4.6 11/17/2018 04:28 AM  
 Chloride 106 11/17/2018 04:28 AM  
 CO2 26 11/17/2018 04:28 AM  
 BUN 30 (H) 11/17/2018 04:28 AM  
 Creatinine 1.03 (H) 11/17/2018 04:28 AM  
 Glucose 258 (H) 11/17/2018 04:28 AM  
 Calcium 8.1 (L) 11/17/2018 04:28 AM  
 Magnesium 2.2 11/17/2018 04:28 AM  
 Lactic acid 1.4 06/28/2018 04:22 AM  
 
 
Lab Results Component Value Date/Time WBC 9.9 11/17/2018 04:28 AM  
 HGB 12.1 11/17/2018 04:28 AM  
 PLATELET 604 50/97/3707 04:28 AM  
 MCV 94.6 11/17/2018 04:28 AM  
 
 
Lab Results Component Value Date/Time INR 1.1 10/17/2018 12:53 PM  
 AST (SGOT) 16 11/17/2018 04:28 AM  
 Alk. phosphatase 127 (H) 11/17/2018 04:28 AM  
 Protein, total 5.5 (L) 11/17/2018 04:28 AM  
 Albumin 2.6 (L) 11/17/2018 04:28 AM  
 Globulin 2.9 11/17/2018 04:28 AM  
 
 
No results found for: IRON, FE, TIBC, IBCT, PSAT, FERR Lab Results Component Value Date/Time TSH 0.58 11/13/2018 03:26 PM  
  
 
No results found for: PH, PHI, PCO2, PCO2I, PO2, PO2I, HCO3, HCO3I, FIO2, FIO2I Lab Results Component Value Date/Time CK 25 (L) 11/13/2018 03:26 PM  
 CK-MB Index 4.4 (H) 11/13/2018 03:26 PM  
 Troponin-I, Qt. <0.05 11/13/2018 03:26 PM  
  
 
Lab Results Component Value Date/Time Culture result: ESCHERICHIA COLI (PREDOMINATING) (A) 06/28/2018 07:01 AM  
 Culture result: ENTEROBACTER AEROGENES (A) 06/28/2018 07:01 AM  
 Culture result: (A) 06/28/2018 04:13 AM  
  ENTEROBACTER AEROGENES GROWING IN 1 OF 4 BOTTLES DRAWN (SITE=L UPPER ARM) Culture result: (A) 06/28/2018 04:13 AM  
  PRELIMINARY REPORT OF 
GRAM NEGATIVE COCCOBACILLI 
GROWING IN 1 OF 4 BOTTLES DRAWN 
CALLED TO AND READ BACK BY 
CHANG HUNTLEY RN [facility] AT 3010 ON 6/28/2018. [address] Culture result: REMAINING BOTTLE(S) HAS/HAVE NO GROWTH IN 5 DAYS 06/28/2018 04:13 AM  
 
 
No results found for: TOXA1, RPR, HBCM, HBSAG, HAAB, HCAB1, HAAT, G6PD, CRYAC, HIVGT, HIVR, HIV1, HIV12, HIVPC, HIVRPI Lab Results Component Value Date/Time CK 25 (L) 11/13/2018 03:26 PM  
 
 
Lab Results Component Value Date/Time  Color YELLOW/STRAW 11/13/2018 07:58 PM  
 Appearance CLOUDY (A) 11/13/2018 07:58 PM  
 Specific gravity 1.020 04/16/2017 01:13 PM  
 pH (UA) 5.0 11/13/2018 07:58 PM  
 Protein TRACE (A) 11/13/2018 07:58 PM  
 Glucose >1,000 (A) 11/13/2018 07:58 PM  
 Ketone NEGATIVE  11/13/2018 07:58 PM  
 Bilirubin NEGATIVE  11/13/2018 07:58 PM  
 Blood TRACE (A) 11/13/2018 07:58 PM  
 Urobilinogen 0.2 11/13/2018 07:58 PM  
 Nitrites NEGATIVE  11/13/2018 07:58 PM  
 Leukocyte Esterase MODERATE (A) 11/13/2018 07:58 PM  
 WBC  11/13/2018 07:58 PM  
 RBC 0-5 11/13/2018 07:58 PM  
 Bacteria 4+ (A) 11/13/2018 07:58 PM  
 
 
IMPRESSION 
· COPD exacerbation · Shortness of breath · No evidence of pulmonary hypertension based on recent RHC; normal hemodynamics on RHC · History of DVT, on Xarelto · NAIDA, resolved · Closed wedge compression fracture of T7 vertebra · Afib with RVR; now sinus tachycardia PLAN 
· Transfer to telemetry · Increase Coreg to 25mg BID · Duonebs; brovana/pulmicort · Singulair · PO prednisone; taper as tolerated · No indication for antibiotics at this time · Stop IVFs · Cardiac diet · Ortho following, kyphoplasty on hold as she is on plavix for stents · CPAP nightly and with naps for known HAYES · PPI 
· On xarelto chronically, plavix for stents Daniella Al MD

## 2018-11-17 NOTE — PROGRESS NOTES
0700-Bedside report received from AVINASH Freeman RN. SBAR, Kardex, Intake/Output, MAR, Recent Results and Cardiac Rhythm SR to ST were discussed. Pt observed asleep, in bed on CPAP. Did not wake with report. VSS. Will assess. Luciana Garzon RN 
0800-At bedside to assess. Pt is without complaint. During assessment at approx 0810, observed pt's cardiac rhythm change to AFib RVR again. Rate as high as 160's. Pt was asymptomatic aside from being able to feel her \"heart racing. \" The rhythm resolved on it's own by [phone] and ST observed on the monitor. Avelina AJ 
0830-Dr. Jolie Poon with cardiology informed of the above. Also confirmed with cardiology that switch is to be made from xarelto to lovenox; rationale is easier washout in case kyphoplasty is decided to be done. Avelina AJ 
1934-TRANSFER - OUT REPORT: 
 
Verbal report given to Virginia Webb RN(name) on [facility]  being transferred to telemetry(unit) for routine progression of care Report consisted of patients Situation, Background, Assessment and  
Recommendations(SBAR). Information from the following report(s) SBAR, Kardex, Procedure Summary, Intake/Output, MAR, Recent Results and Cardiac Rhythm NSR to ST was reviewed with the receiving nurse. Lines:  
Peripheral IV 11/16/18 Left;Upper Arm (Active) Site Assessment Clean, dry, & intact 11/17/2018  4:00 PM  
Phlebitis Assessment 0 11/17/2018  4:00 PM  
Infiltration Assessment 0 11/17/2018  4:00 PM  
Dressing Status Clean, dry, & intact 11/17/2018  4:00 PM  
Dressing Type Transparent 11/17/2018  4:00 PM  
Hub Color/Line Status Pink;Flushed;Patent 11/17/2018  4:00 PM  
Action Taken Open ports on tubing capped 11/17/2018  3:30 AM  
Alcohol Cap Used Yes 11/17/2018  4:00 PM  
  
 
Opportunity for questions and clarification was provided. Patient transported with: 
 Monitor Tech AVINASH Hamilton RN

## 2018-11-17 NOTE — PROGRESS NOTES
Problem: Falls - Risk of 
Goal: *Absence of Falls Document Terence Ovalles Fall Risk and appropriate interventions in the flowsheet. Outcome: Progressing Towards Goal 
Fall Risk Interventions: 
Mobility Interventions: Bed/chair exit alarm, Strengthening exercises (ROM-active/passive) Medication Interventions: Bed/chair exit alarm, Evaluate medications/consider consulting pharmacy Elimination Interventions: Bed/chair exit alarm, Call light in reach, Patient to call for help with toileting needs, Toileting schedule/hourly rounds History of Falls Interventions: Bed/chair exit alarm, Door open when patient unattended, Evaluate medications/consider consulting pharmacy, Investigate reason for fall, Room close to nurse's station

## 2018-11-18 LAB
ALBUMIN SERPL-MCNC: 2.4 G/DL (ref 3.5–5)
ALBUMIN/GLOB SERPL: 0.8 {RATIO} (ref 1.1–2.2)
ALP SERPL-CCNC: 122 U/L (ref 45–117)
ALT SERPL-CCNC: 54 U/L (ref 12–78)
ANION GAP SERPL CALC-SCNC: 7 MMOL/L (ref 5–15)
AST SERPL-CCNC: 19 U/L (ref 15–37)
BASOPHILS # BLD: 0 K/UL (ref 0–0.1)
BASOPHILS NFR BLD: 0 % (ref 0–1)
BILIRUB SERPL-MCNC: 0.3 MG/DL (ref 0.2–1)
BUN SERPL-MCNC: 28 MG/DL (ref 6–20)
BUN/CREAT SERPL: 29 (ref 12–20)
CALCIUM SERPL-MCNC: 8.3 MG/DL (ref 8.5–10.1)
CHLORIDE SERPL-SCNC: 105 MMOL/L (ref 97–108)
CO2 SERPL-SCNC: 26 MMOL/L (ref 21–32)
CREAT SERPL-MCNC: 0.98 MG/DL (ref 0.55–1.02)
DIFFERENTIAL METHOD BLD: ABNORMAL
EOSINOPHIL # BLD: 0 K/UL (ref 0–0.4)
EOSINOPHIL NFR BLD: 0 % (ref 0–7)
ERYTHROCYTE [DISTWIDTH] IN BLOOD BY AUTOMATED COUNT: 15.3 % (ref 11.5–14.5)
GLOBULIN SER CALC-MCNC: 3.2 G/DL (ref 2–4)
GLUCOSE BLD STRIP.AUTO-MCNC: 180 MG/DL (ref 65–100)
GLUCOSE BLD STRIP.AUTO-MCNC: 198 MG/DL (ref 65–100)
GLUCOSE BLD STRIP.AUTO-MCNC: 291 MG/DL (ref 65–100)
GLUCOSE BLD STRIP.AUTO-MCNC: 324 MG/DL (ref 65–100)
GLUCOSE SERPL-MCNC: 246 MG/DL (ref 65–100)
HCT VFR BLD AUTO: 37.2 % (ref 35–47)
HGB BLD-MCNC: 12 G/DL (ref 11.5–16)
IMM GRANULOCYTES # BLD: 0 K/UL
IMM GRANULOCYTES NFR BLD AUTO: 0 %
LYMPHOCYTES # BLD: 0.4 K/UL (ref 0.8–3.5)
LYMPHOCYTES NFR BLD: 4 % (ref 12–49)
MAGNESIUM SERPL-MCNC: 2 MG/DL (ref 1.6–2.4)
MCH RBC QN AUTO: 29.6 PG (ref 26–34)
MCHC RBC AUTO-ENTMCNC: 32.3 G/DL (ref 30–36.5)
MCV RBC AUTO: 91.9 FL (ref 80–99)
METAMYELOCYTES NFR BLD MANUAL: 2 %
MONOCYTES # BLD: 0.3 K/UL (ref 0–1)
MONOCYTES NFR BLD: 3 % (ref 5–13)
NEUTS BAND NFR BLD MANUAL: 1 % (ref 0–6)
NEUTS SEG # BLD: 8.5 K/UL (ref 1.8–8)
NEUTS SEG NFR BLD: 90 % (ref 32–75)
NRBC # BLD: 0 K/UL (ref 0–0.01)
NRBC BLD-RTO: 0 PER 100 WBC
PLATELET # BLD AUTO: 154 K/UL (ref 150–400)
POTASSIUM SERPL-SCNC: 4.2 MMOL/L (ref 3.5–5.1)
PROT SERPL-MCNC: 5.6 G/DL (ref 6.4–8.2)
RBC # BLD AUTO: 4.05 M/UL (ref 3.8–5.2)
RBC MORPH BLD: ABNORMAL
SERVICE CMNT-IMP: ABNORMAL
SODIUM SERPL-SCNC: 138 MMOL/L (ref 136–145)
WBC # BLD AUTO: 9.3 K/UL (ref 3.6–11)

## 2018-11-18 PROCEDURE — 94760 N-INVAS EAR/PLS OXIMETRY 1: CPT

## 2018-11-18 PROCEDURE — 74011250637 HC RX REV CODE- 250/637: Performed by: PHYSICIAN ASSISTANT

## 2018-11-18 PROCEDURE — 74011250637 HC RX REV CODE- 250/637: Performed by: INTERNAL MEDICINE

## 2018-11-18 PROCEDURE — 74011636637 HC RX REV CODE- 636/637: Performed by: FAMILY MEDICINE

## 2018-11-18 PROCEDURE — 94660 CPAP INITIATION&MGMT: CPT

## 2018-11-18 PROCEDURE — 36415 COLL VENOUS BLD VENIPUNCTURE: CPT

## 2018-11-18 PROCEDURE — 82962 GLUCOSE BLOOD TEST: CPT

## 2018-11-18 PROCEDURE — 77030038269 HC DRN EXT URIN PURWCK BARD -A

## 2018-11-18 PROCEDURE — 85025 COMPLETE CBC W/AUTO DIFF WBC: CPT

## 2018-11-18 PROCEDURE — 80053 COMPREHEN METABOLIC PANEL: CPT

## 2018-11-18 PROCEDURE — 94640 AIRWAY INHALATION TREATMENT: CPT

## 2018-11-18 PROCEDURE — 83735 ASSAY OF MAGNESIUM: CPT

## 2018-11-18 PROCEDURE — 74011000250 HC RX REV CODE- 250: Performed by: INTERNAL MEDICINE

## 2018-11-18 PROCEDURE — 77010033678 HC OXYGEN DAILY

## 2018-11-18 PROCEDURE — 65660000000 HC RM CCU STEPDOWN

## 2018-11-18 RX ORDER — BUMETANIDE 1 MG/1
0.5 TABLET ORAL DAILY
Status: DISCONTINUED | OUTPATIENT
Start: 2018-11-18 | End: 2018-11-18

## 2018-11-18 RX ORDER — ENOXAPARIN SODIUM 100 MG/ML
40 INJECTION SUBCUTANEOUS EVERY 24 HOURS
Status: DISCONTINUED | OUTPATIENT
Start: 2018-11-19 | End: 2018-11-22

## 2018-11-18 RX ADMIN — Medication 10 ML: at 22:17

## 2018-11-18 RX ADMIN — INSULIN LISPRO 3 UNITS: 100 INJECTION, SOLUTION INTRAVENOUS; SUBCUTANEOUS at 13:52

## 2018-11-18 RX ADMIN — PANTOPRAZOLE SODIUM 40 MG: 40 TABLET, DELAYED RELEASE ORAL at 06:28

## 2018-11-18 RX ADMIN — Medication 10 ML: at 16:36

## 2018-11-18 RX ADMIN — PANTOPRAZOLE SODIUM 40 MG: 40 TABLET, DELAYED RELEASE ORAL at 16:38

## 2018-11-18 RX ADMIN — LISINOPRIL 5 MG: 5 TABLET ORAL at 09:49

## 2018-11-18 RX ADMIN — INSULIN LISPRO 3 UNITS: 100 INJECTION, SOLUTION INTRAVENOUS; SUBCUTANEOUS at 08:00

## 2018-11-18 RX ADMIN — DULOXETINE 60 MG: 30 CAPSULE, DELAYED RELEASE ORAL at 09:49

## 2018-11-18 RX ADMIN — INSULIN LISPRO 4 UNITS: 100 INJECTION, SOLUTION INTRAVENOUS; SUBCUTANEOUS at 22:16

## 2018-11-18 RX ADMIN — IPRATROPIUM BROMIDE AND ALBUTEROL SULFATE 3 ML: .5; 3 SOLUTION RESPIRATORY (INHALATION) at 13:38

## 2018-11-18 RX ADMIN — BUDESONIDE 500 MCG: 0.5 INHALANT RESPIRATORY (INHALATION) at 08:23

## 2018-11-18 RX ADMIN — ISOSORBIDE MONONITRATE 30 MG: 30 TABLET, EXTENDED RELEASE ORAL at 09:49

## 2018-11-18 RX ADMIN — Medication 10 ML: at 06:27

## 2018-11-18 RX ADMIN — CARVEDILOL 25 MG: 12.5 TABLET, FILM COATED ORAL at 16:37

## 2018-11-18 RX ADMIN — CARVEDILOL 25 MG: 12.5 TABLET, FILM COATED ORAL at 09:49

## 2018-11-18 RX ADMIN — IPRATROPIUM BROMIDE AND ALBUTEROL SULFATE 3 ML: .5; 3 SOLUTION RESPIRATORY (INHALATION) at 19:26

## 2018-11-18 RX ADMIN — INSULIN LISPRO 10 UNITS: 100 INJECTION, SOLUTION INTRAVENOUS; SUBCUTANEOUS at 10:00

## 2018-11-18 RX ADMIN — PREDNISONE 20 MG: 20 TABLET ORAL at 16:38

## 2018-11-18 RX ADMIN — SAXAGLIPTIN 5 MG: 5 TABLET, FILM COATED ORAL at 09:49

## 2018-11-18 RX ADMIN — BUDESONIDE 500 MCG: 0.5 INHALANT RESPIRATORY (INHALATION) at 19:26

## 2018-11-18 RX ADMIN — IPRATROPIUM BROMIDE AND ALBUTEROL SULFATE 3 ML: .5; 3 SOLUTION RESPIRATORY (INHALATION) at 01:24

## 2018-11-18 RX ADMIN — IPRATROPIUM BROMIDE AND ALBUTEROL SULFATE 3 ML: .5; 3 SOLUTION RESPIRATORY (INHALATION) at 23:25

## 2018-11-18 RX ADMIN — PREDNISONE 20 MG: 20 TABLET ORAL at 09:49

## 2018-11-18 RX ADMIN — CLOPIDOGREL BISULFATE 75 MG: 75 TABLET ORAL at 09:49

## 2018-11-18 RX ADMIN — IPRATROPIUM BROMIDE AND ALBUTEROL SULFATE 3 ML: .5; 3 SOLUTION RESPIRATORY (INHALATION) at 08:23

## 2018-11-18 RX ADMIN — MONTELUKAST SODIUM 10 MG: 10 TABLET, COATED ORAL at 09:49

## 2018-11-18 NOTE — PROGRESS NOTES
Patient for kyphoplasty tomorrow. Cardiology ok with holding anticoagulation for procedure, but not Plavix. Decision for resuming post procedure up to IR. TLSO ordered in case procedure is delayed. Claudius Meigs, PA-C Orthopaedic Surgery  [facility]

## 2018-11-18 NOTE — PROGRESS NOTES
Mohan Ibrahim MD. [facility] Patient: Zaira James : 1945 Today's Date: 2018 CARDIOLOGY PROGRESS NOTE 
S: Laying comfortably. Breathing OK. No CP. Feels well today. O: 
  
Physical Exam: 
Visit Vitals /82 (BP 1 Location: Right arm, BP Patient Position: At rest;Head of bed elevated (Comment degrees)) Pulse 79 Temp 98.8 °F (37.1 °C) Resp 18 Ht 5' 7\" (1.702 m) Wt 226 lb 12.8 oz (102.9 kg) SpO2 98% Breastfeeding? No  
BMI 36.61 kg/m² Patient appears generally well, mood and affect are appropriate and pleasant. HEENT:  Hearing intact, non-icteric, normocephalic, atraumatic. Neck Exam: Supple Lung Exam: Clear to auscultation, even breath sounds. Cardiac Exam: Regular rate and rhythm with no murmur Abdomen: Soft, non-tender, + obese Extremities: MAW, No lower extremity edema. Psych: Appropriate affect Neuro - Grossly intact 
  
  
Review of Symptoms: 
Constitutional: Negative for fever HEENT: Negative for vision changes. Respiratory: Negative for productive cough Cardiovascular: Negative for syncope Gastrointestinal: Negative for abdominal pain, melena Genitourinary: Negative for dysuria Skin: Negative for rash Heme: No problems bleeding. Neuro - no speech changes or focal weaknesses 
  
 
Intake/Output Summary (Last 24 hours) at 2018 1245 Last data filed at 2018 2822 Gross per 24 hour Intake 915 ml Output 1800 ml Net -885 ml  
 
 
  
  
  
LABS / OTHER STUDIES:  
  
Recent Results (from the past 24 hour(s)) GLUCOSE, POC Collection Time: 18  4:40 PM  
Result Value Ref Range Glucose (POC) 257 (H) 65 - 100 mg/dL Performed by Feliciano Chadwick GLUCOSE, POC Collection Time: 18  9:37 PM  
Result Value Ref Range Glucose (POC) 217 (H) 65 - 100 mg/dL Performed by Hieu Ceballos MAGNESIUM Collection Time: 18  1:04 AM  
Result Value Ref Range Magnesium 2.0 1.6 - 2.4 mg/dL CBC WITH AUTOMATED DIFF Collection Time: 11/18/18  1:04 AM  
Result Value Ref Range WBC 9.3 3.6 - 11.0 K/uL  
 RBC 4.05 3.80 - 5.20 M/uL  
 HGB 12.0 11.5 - 16.0 g/dL HCT 37.2 35.0 - 47.0 % MCV 91.9 80.0 - 99.0 FL  
 MCH 29.6 26.0 - 34.0 PG  
 MCHC 32.3 30.0 - 36.5 g/dL  
 RDW 15.3 (H) 11.5 - 14.5 % PLATELET 900 168 - 652 K/uL NRBC 0.0 0  WBC ABSOLUTE NRBC 0.00 0.00 - 0.01 K/uL NEUTROPHILS 90 (H) 32 - 75 % BAND NEUTROPHILS 1 0 - 6 % LYMPHOCYTES 4 (L) 12 - 49 % MONOCYTES 3 (L) 5 - 13 % EOSINOPHILS 0 0 - 7 % BASOPHILS 0 0 - 1 % METAMYELOCYTES 2 (H) 0 % IMMATURE GRANULOCYTES 0 %  
 ABS. NEUTROPHILS 8.5 (H) 1.8 - 8.0 K/UL  
 ABS. LYMPHOCYTES 0.4 (L) 0.8 - 3.5 K/UL  
 ABS. MONOCYTES 0.3 0.0 - 1.0 K/UL  
 ABS. EOSINOPHILS 0.0 0.0 - 0.4 K/UL  
 ABS. BASOPHILS 0.0 0.0 - 0.1 K/UL  
 ABS. IMM. GRANS. 0.0 K/UL  
 DF MANUAL    
 RBC COMMENTS NORMOCYTIC, NORMOCHROMIC METABOLIC PANEL, COMPREHENSIVE Collection Time: 11/18/18  1:04 AM  
Result Value Ref Range Sodium 138 136 - 145 mmol/L Potassium 4.2 3.5 - 5.1 mmol/L Chloride 105 97 - 108 mmol/L  
 CO2 26 21 - 32 mmol/L Anion gap 7 5 - 15 mmol/L Glucose 246 (H) 65 - 100 mg/dL BUN 28 (H) 6 - 20 MG/DL Creatinine 0.98 0.55 - 1.02 MG/DL  
 BUN/Creatinine ratio 29 (H) 12 - 20 GFR est AA >60 >60 ml/min/1.73m2 GFR est non-AA 56 (L) >60 ml/min/1.73m2 Calcium 8.3 (L) 8.5 - 10.1 MG/DL Bilirubin, total 0.3 0.2 - 1.0 MG/DL  
 ALT (SGPT) 54 12 - 78 U/L  
 AST (SGOT) 19 15 - 37 U/L Alk. phosphatase 122 (H) 45 - 117 U/L Protein, total 5.6 (L) 6.4 - 8.2 g/dL Albumin 2.4 (L) 3.5 - 5.0 g/dL Globulin 3.2 2.0 - 4.0 g/dL A-G Ratio 0.8 (L) 1.1 - 2.2 GLUCOSE, POC Collection Time: 11/18/18  6:35 AM  
Result Value Ref Range Glucose (POC) 198 (H) 65 - 100 mg/dL Performed by Madison Pardo GLUCOSE, POC  Collection Time: 11/18/18 11:40 AM  
 Result Value Ref Range Glucose (POC) 180 (H) 65 - 100 mg/dL Performed by Robert Deras (PCT)   
 
 
  
CARDIAC DIAGNOSTICS:  
  
Cardiac Evaluation Includes: 
  
Cath -1/15 -  PCI to RCA with BMS ( NSTEMI 10/2015); Cath 6/6/18- LAD Stent Resolute(SAMUEL/D1 PTCA (at [facility]) Echo 6/18 - TDS. LVEF 60% Cath 10/19/18- patent stents, EF 60% 
  
  
ASSESSMENT AND PLAN:  
  
Assessment and Plan: Ms. Deja Lino is a 74-year-old  female with multiple medical problems including CAD, asthma, chronic dyspnea, CKD, obesity, on chronic anticoagulation for recurrent DVT who is admitted for dyspnea/back pain. Diagnosed to have vertebral fracture.  Scheduled for kyphoplasty.  Cardiology consulted to assist with medications since patient has been on Plavix/Xarelto prior to admission.  History of stent placement in June 2018.   She developed Afib with RVR this admission, but converted back to NSR.  
  
1) CAD 
- s/p PCI to RCA with BMS ( NSTEMI 10/2015) and on 6/6/18- LAD Stent Resolute(SAMUEL/D1 PTCA (at [facility]) - Given recent stenting within past 6 months, would prefer plavix continue uninterrupted if possible  
- on plavix, BB, chronic Xarelto (held for now), Imdur, ACE-I. She is intolerant of statins.   
2) Paroxysmal Afib 
- Newly discovered this admission - She has converted back to NSR 
- cont Coreg 
- OK to hold [facility] for anticipated procedure and then resume afterwards.   
3) History of DVT's (1998 and 2006) - Has been on chronic Xarelto  
- OK to hold Xarelto for surgery - will use Lovenox while in the hospital and switch to [facility] back by discharge   
4) HLD - intolerant to multiple statins sec to myalgia. Unable to afford PCSK9 Inhibitors 
- readdress as outpatient   
5) Dyspnea  
- likely multifactorial (including chronic HF and COPD exacerbation) - On IV steroids.   
- Breathing OK on 11/17/18 and 11/18/18 --- She uses Bumex 0.5 mg just as needed and infrequently as an outpatient. Will hold diuretics for now and use as needed. 7) Closed wedge compression fracture of T7 vertebra  
- kyphoplasty planned  
- she can proceed with her procedure anytime from a cardiac standpoint.  
- Given recent PCI, would like to plavix to continue uninterrupted if possible  
 - Will cut back Lovenox to just DVT prophylaxis dose given plans for intervention (kyphoplasty) tomorrow - can resume outpatient [address] afterwards   
8) I will sign off. Please call with any questions. She can FU with Dr. Tomas Amador as an outpatient (Appt for 11/26/18).   
  
Millicent Navarro MD, [facility] [address], [address]      [place] [address] [address] Ph: [phone]                               Ph [phone]

## 2018-11-18 NOTE — PROGRESS NOTES
Physical Therapy: 
 
Consult received, chart reviewed and RN consulted. Patient to go for kyphoplasty tomorrow, which had been planned but delayed due to cardiac/coagulation concerns. Patient has order also for TLSO, if procedure delayed, but does not wish to use prior if not needed afterwards. MD instructions clear on patient not ambulating without TLSO. Will defer OOB evaluation at this time and f/u s/p kyphoplasty for evaluation.  
 
Abad Soto, MS, PT

## 2018-11-18 NOTE — PROGRESS NOTES
Problem: Falls - Risk of 
Goal: *Absence of Falls Document Genevive Camera Fall Risk and appropriate interventions in the flowsheet. Outcome: Progressing Towards Goal 
Fall Risk Interventions: 
Mobility Interventions: Bed/chair exit alarm, Communicate number of staff needed for ambulation/transfer, PT Consult for mobility concerns, OT consult for ADLs Medication Interventions: Evaluate medications/consider consulting pharmacy, Patient to call before getting OOB Elimination Interventions: Bed/chair exit alarm, Call light in reach, Patient to call for help with toileting needs, Toileting schedule/hourly rounds History of Falls Interventions: Bed/chair exit alarm, Consult care management for discharge planning, Door open when patient unattended, Evaluate medications/consider consulting pharmacy, Investigate reason for fall, Room close to nurse's station, Utilize gait belt for transfer/ambulation Problem: Pressure Injury - Risk of 
Goal: *Prevention of pressure injury Document Darrion Scale and appropriate interventions in the flowsheet. Outcome: Progressing Towards Goal 
Pressure Injury Interventions: 
  
 
Moisture Interventions: Absorbent underpads, Apply protective barrier, creams and emollients, Check for incontinence Q2 hours and as needed, Internal/External urinary devices, Moisture barrier, Offer toileting Q_hr Activity Interventions: Assess need for specialty bed, Pressure redistribution bed/mattress(bed type), PT/OT evaluation Mobility Interventions: Assess need for specialty bed, Float heels, HOB 30 degrees or less, Pressure redistribution bed/mattress (bed type), PT/OT evaluation, Turn and reposition approx. every two hours(pillow and wedges) Nutrition Interventions: Document food/fluid/supplement intake Friction and Shear Interventions: Apply protective barrier, creams and emollients, Feet elevated on foot rest, HOB 30 degrees or less, Lift sheet, Lift team/patient mobility team, Minimize layers, Transfer aides:transfer board/Reilly lift/ceiling lift, Transferring/repositioning devices

## 2018-11-18 NOTE — PROGRESS NOTES
TRANSFER - IN REPORT: 
 
Verbal report received from MADAI Go(name) on Jc Webster  being received from ICU(unit) for routine progression of care Report consisted of patients Situation, Background, Assessment and  
Recommendations(SBAR). Information from the following report(s) SBAR, Kardex, ED Summary, Intake/Output, Recent Results, Med Rec Status and Cardiac Rhythm NSR/ST was reviewed with the receiving nurse. Opportunity for questions and clarification was provided. Assessment completed upon patients arrival to unit and care assumed. 2000: Pt arrived vihari tobin; accompanied by nursing tech. Pt alert, oriented;  transferred to bed with 4 assist; and log rolled to reposition. Pt tolerated well. Bedside and Verbal shift change report given to Isaiah Hobbs RN (oncoming nurse) by Alex Son RN (offgoing nurse). Report included the following information SBAR, Kardex, ED Summary, Intake/Output, Recent Results, Med Rec Status and Cardiac Rhythm NSR/ST.

## 2018-11-18 NOTE — PROGRESS NOTES
Daily Progress Note: 11/18/2018 [place] Loris Ripper, MD 
 
Assessment/Plan: Dyspnea (11/13/2018) - workup with Pul and Card. - Pulmonary following and now on po steroids, V/Q scan with low prob for PE 
            - consulted Orthopedics - may benefit from Kyphoplasty  
  
  Type II diabetes mellitus ([facility] (10/9/2015) - with reported nephropathy with proteinuria without CKD 
            - continue saxagliptin and follow on SSI 
  
  Coronary artery disease involving native coronary artery without angina pectoris (1/22/2016) - stable, continue cardiac meds 
            - remain on Plavix 
  
  Essential hypertension (1/22/2016) - cont home meds - adjust as needed 
  
  Chronic anticoagulation (3/30/2018) - holding Xarelto for possible kyphoplasty- on Lovenox 
  
  Obesity, BMI 30-39. 9([facility]) (10/1/2018) - counseled on weight loss, this is contributing to her current symptoms 
            - looks very Pickwickian  
  
  ARF (acute renal failure) ([facility] (11/13/2018) - creatinine up acutely from prior, possibly due to diuresis 
            - hold diuretic  
  
  Closed wedge compression fracture of T7 vertebra ([facility] (11/13/2018) - consulted Orthopedics, hopefully she will be a candidate for kyphoplasty 
            - cardiology ok with holding Lovenox/Xarelto for procedure but would like to continue               Plavix uninterrupted Code status: 
            - patient is FULL CODE, no AMD on file, her daughter Terri Key is her surrogate 
   
 
Problem List: 
Problem List as of 11/18/2018 Date Reviewed: 11/13/2018 Codes Class Noted - Resolved * (Principal) Dyspnea ICD-10-CM: R06.00 
ICD-9-CM: 786.09  11/13/2018 - Present ARF (acute renal failure) (HCC) ICD-10-CM: N17.9 ICD-9-CM: 584.9  11/13/2018 - Present Obesity (BMI 30-39.9) (Chronic) ICD-10-CM: C51.9 ICD-9-CM: 278.00  11/13/2018 - Present Closed wedge compression fracture of T7 vertebra ([facility] ICD-10-CM: S81.557R ICD-9-CM: 805.2  11/13/2018 - Present Type 2 diabetes with nephropathy ([facility] ICD-10-CM: E11.21 
ICD-9-CM: 250.40, 583.81  10/1/2018 - Present Chest pain ICD-10-CM: R07.9 ICD-9-CM: 786.50  6/27/2018 - Present Generalized abdominal pain ICD-10-CM: R10.84 ICD-9-CM: 789.07  6/27/2018 - Present Recurrent deep vein thrombosis (DVT) (HCC) ICD-10-CM: I82.409 ICD-9-CM: 453.40  3/30/2018 - Present Chronic anticoagulation (Chronic) ICD-10-CM: Z79.01 
ICD-9-CM: V58.61  3/30/2018 - Present Coronary artery disease involving native coronary artery without angina pectoris (Chronic) ICD-10-CM: I25.10 ICD-9-CM: 414.01  1/22/2016 - Present Essential hypertension (Chronic) ICD-10-CM: I10 
ICD-9-CM: 401.9  1/22/2016 - Present Type II diabetes mellitus (HCC) (Chronic) ICD-10-CM: E11.9 ICD-9-CM: 250.00  10/9/2015 - Present NSTEMI (non-ST elevated myocardial infarction) ([facility] ICD-10-CM: I21.4 ICD-9-CM: 410.70  10/9/2015 - Present RESOLVED: CAD (coronary artery disease) ICD-10-CM: I25.10 ICD-9-CM: 414.00  10/9/2015 - 1/22/2016 RESOLVED: HTN (hypertension) ICD-10-CM: I10 
ICD-9-CM: 401.9  10/9/2015 - 1/22/2016 Subjective:  
  81 y.o.  female who is admitted with SOB. Ms. Yoly Gamino presented to the Emergency Department this PM complaining of SOB: for the past 3 months, worked up by Pulmonary and Cardiology as an outpatient without any definite diagnosis, not hypoxic, associated with back pain, unable to take a deep breath without pain, back pain started at the same time as the SOB. History obtained from chart review and the patient. Previous records reviewed, recent admit for chest pain. (Dr John Trujillo). :  Feels much better this AM with less back pain on meds. Still very tender over site with any palpation. Less air hunger but still \"not breathing back to normal yet. \"  Glucoses up with the steroids. Pul and Card seeing also. 11/15:    Reports she continues to feel better and reports no longer SOB. She is not moving much due to back pain. No chest pains. Ortho to decide next step for compression fx. No complaints of weakness or numbness LEs. 
 
:  No longer short of breath - will wean steroids to po. IR plans kyphoplasty today. May benefit from Prolia for osteoporosis outpt  
 
18: Transferred to ICU yesterday for A-fib w/ RVR. Converted after Dilt bolus. Now sinus tach in the 100s. Coreg increased this am. She is not requiring a lot of pain meds but also not moving a lot. Xarelto being held and she is on Lovenox now. 18: Transferred out of ICU yesterday. Having pain with any movement. Dr Arlyn Geiger would like her to stay on Plavix but is ok holding Xarelto/Lovenox for kyphoplasty. Will need to see if IR can do kyphoplasty on Plavix. Ortho getting her fitted for brace. Breathing well. No SOB.  
  
Review of Systems: A comprehensive review of systems was negative except for that written in the HPI. Objective:  
Physical Exam:  
 
Visit Vitals /74 Pulse 85 Temp 97.6 °F (36.4 °C) Resp 18 Ht 5' 7\" (1.702 m) Wt 226 lb 12.8 oz (102.9 kg) SpO2 98% Breastfeeding? No  
BMI 36.61 kg/m² O2 Flow Rate (L/min): 3 l/min O2 Device: Nasal cannula Temp (24hrs), Av.1 °F (36.7 °C), Min:97.6 °F (36.4 °C), Max:98.9 °F (37.2 °C) 
  701 - 1900 In: -  
Out: 450 [Urine:450]   1901 -  0700 In: 2872.5 [P.O.:1745; I.V.:1127.5] Out: 2550 [Urine:2550] General:  Alert, cooperative, obese, no distress, appears stated age. Head:  Normocephalic, without obvious abnormality, atraumatic. Eyes:  Conjunctivae/corneas clear. PERRL, EOMs intact. Throat: Lips, mucosa, and tongue moist.. Neck: Supple, symmetrical, trachea midline, no adenopathy, thyroid: no enlargement/tenderness/nodules, no carotid bruit and no JVD. Back:   Symmetric,with marked tenderness to palpation over T6-7-8 area. Lungs:   Diminished BS bilaterally but otherwise clear at this time. Chest wall:  No tenderness or deformity. Heart:  Irregular rate and rhythm, no murmur, click, rub or gallop. Abdomen:   Soft, non-tender. Bowel sounds normal. No masses,  No organomegaly. Extremities: no cyanosis. Trace LE edema. No calf tenderness or cords. Skin:  turgor normal.   
Neurologic: CNII-XII intact. Alert and oriented X 3. Fine motor of hands and fingers normal.   equal.  No cogwheeling or rigidity. Gait not tested at this time. Sensation grossly normal to touch. Gross motor of extremities normal.    
 
Data Review:  
  CT of Chest 11/13/18 FINDINGS: 
THYROID: No nodule. MEDIASTINUM: No mass or lymphadenopathy. SONJA: No mass or lymphadenopathy. THORACIC AORTA: No aneurysm. Mild vascular calcifications MAIN PULMONARY ARTERY: Normal in caliber. TRACHEA/BRONCHI: Patent. ESOPHAGUS: No wall thickening or dilatation. Small hiatal hernia HEART: Normal in size. PLEURA: No effusion or pneumothorax. LUNGS: No nodule, mass, or airspace disease. Bilateral atelectatic changes. INCIDENTALLY IMAGED UPPER ABDOMEN: No focal abnormality. BONES: Bones are osteopenic. There is collapse of the superior endplate of T7. This has occurred since June 2018 Possible milk of calcium cyst within the right breast 
IMPRESSION: 
1. No acute cardiopulmonary disease 2. Interval collapse superior endplate of T7. The patient has back pain this may 
be acute to subacute and MR imaging would better characterize 
  
Recent Days: 
Recent Labs 11/18/18 
0104 11/17/18 
0428 11/16/18 
0400 WBC 9.3 9.9 10.7 HGB 12.0 12.1 12.1 HCT 37.2 38.9 38.9  157 178 Recent Labs 11/18/18 
0104 11/17/18 
0428 11/16/18 
0400  141 139  
K 4.2 4.6 4.9  
 106 106 CO2 26 26 23 * 258* 227* BUN 28* 30* 30* CREA 0.98 1.03* 0.93  
CA 8.3* 8.1* 8.4* MG 2.0 2.2 2.2 ALB 2.4* 2.6* 2.5* TBILI 0.3 0.4 0.5 SGOT 19 16 37 ALT 54 57 54 No results for input(s): PH, PCO2, PO2, HCO3, FIO2 in the last 72 hours. 24 Hour Results: 
Recent Results (from the past 24 hour(s)) GLUCOSE, POC Collection Time: 11/17/18 12:12 PM  
Result Value Ref Range Glucose (POC) 226 (H) 65 - 100 mg/dL Performed by Alexandria Kay GLUCOSE, POC Collection Time: 11/17/18  4:40 PM  
Result Value Ref Range Glucose (POC) 257 (H) 65 - 100 mg/dL Performed by Johnson De La Cruz GLUCOSE, POC Collection Time: 11/17/18  9:37 PM  
Result Value Ref Range Glucose (POC) 217 (H) 65 - 100 mg/dL Performed by Swati Cook MAGNESIUM Collection Time: 11/18/18  1:04 AM  
Result Value Ref Range Magnesium 2.0 1.6 - 2.4 mg/dL CBC WITH AUTOMATED DIFF Collection Time: 11/18/18  1:04 AM  
Result Value Ref Range WBC 9.3 3.6 - 11.0 K/uL  
 RBC 4.05 3.80 - 5.20 M/uL  
 HGB 12.0 11.5 - 16.0 g/dL HCT 37.2 35.0 - 47.0 % MCV 91.9 80.0 - 99.0 FL  
 MCH 29.6 26.0 - 34.0 PG  
 MCHC 32.3 30.0 - 36.5 g/dL  
 RDW 15.3 (H) 11.5 - 14.5 % PLATELET 876 972 - 506 K/uL NRBC 0.0 0  WBC ABSOLUTE NRBC 0.00 0.00 - 0.01 K/uL NEUTROPHILS 90 (H) 32 - 75 % BAND NEUTROPHILS 1 0 - 6 % LYMPHOCYTES 4 (L) 12 - 49 % MONOCYTES 3 (L) 5 - 13 % EOSINOPHILS 0 0 - 7 % BASOPHILS 0 0 - 1 % METAMYELOCYTES 2 (H) 0 % IMMATURE GRANULOCYTES 0 %  
 ABS. NEUTROPHILS 8.5 (H) 1.8 - 8.0 K/UL  
 ABS. LYMPHOCYTES 0.4 (L) 0.8 - 3.5 K/UL  
 ABS. MONOCYTES 0.3 0.0 - 1.0 K/UL  
 ABS. EOSINOPHILS 0.0 0.0 - 0.4 K/UL  
 ABS. BASOPHILS 0.0 0.0 - 0.1 K/UL  
 ABS. IMM. GRANS. 0.0 K/UL  
 DF MANUAL    
 RBC COMMENTS NORMOCYTIC, NORMOCHROMIC METABOLIC PANEL, COMPREHENSIVE  
 Collection Time: 11/18/18  1:04 AM  
Result Value Ref Range Sodium 138 136 - 145 mmol/L Potassium 4.2 3.5 - 5.1 mmol/L Chloride 105 97 - 108 mmol/L  
 CO2 26 21 - 32 mmol/L Anion gap 7 5 - 15 mmol/L Glucose 246 (H) 65 - 100 mg/dL BUN 28 (H) 6 - 20 MG/DL Creatinine 0.98 0.55 - 1.02 MG/DL  
 BUN/Creatinine ratio 29 (H) 12 - 20 GFR est AA >60 >60 ml/min/1.73m2 GFR est non-AA 56 (L) >60 ml/min/1.73m2 Calcium 8.3 (L) 8.5 - 10.1 MG/DL Bilirubin, total 0.3 0.2 - 1.0 MG/DL  
 ALT (SGPT) 54 12 - 78 U/L  
 AST (SGOT) 19 15 - 37 U/L Alk. phosphatase 122 (H) 45 - 117 U/L Protein, total 5.6 (L) 6.4 - 8.2 g/dL Albumin 2.4 (L) 3.5 - 5.0 g/dL Globulin 3.2 2.0 - 4.0 g/dL A-G Ratio 0.8 (L) 1.1 - 2.2 GLUCOSE, POC Collection Time: 11/18/18  6:35 AM  
Result Value Ref Range Glucose (POC) 198 (H) 65 - 100 mg/dL Performed by [facility] Medications reviewed Current Facility-Administered Medications Medication Dose Route Frequency  bumetanide (BUMEX) tablet 0.5 mg  0.5 mg Oral DAILY  enoxaparin (LOVENOX) injection 100 mg  100 mg SubCUTAneous Q12H  carvedilol (COREG) tablet 25 mg  25 mg Oral BID WITH MEALS  predniSONE (DELTASONE) tablet 20 mg  20 mg Oral BID WITH MEALS  clopidogrel (PLAVIX) tablet 75 mg  75 mg Oral DAILY  arformoterol (BROVANA) neb solution 15 mcg  15 mcg Nebulization BID RT  
 budesonide (PULMICORT) 500 mcg/2 ml nebulizer suspension  500 mcg Nebulization BID RT  
 albuterol-ipratropium (DUO-NEB) 2.5 MG-0.5 MG/3 ML  3 mL Nebulization Q6H RT  
 DULoxetine (CYMBALTA) capsule 60 mg  60 mg Oral DAILY  isosorbide mononitrate ER (IMDUR) tablet 30 mg  30 mg Oral DAILY  lisinopril (PRINIVIL, ZESTRIL) tablet 5 mg  5 mg Oral DAILY  montelukast (SINGULAIR) tablet 10 mg  10 mg Oral DAILY  sAXagliptin (ONGLYZA) tablet 5 mg  5 mg Oral DAILY  sodium chloride (NS) flush 5-10 mL  5-10 mL IntraVENous Q8H  
  sodium chloride (NS) flush 5-10 mL  5-10 mL IntraVENous PRN  
 acetaminophen (TYLENOL) tablet 650 mg  650 mg Oral Q4H PRN  
 oxyCODONE-acetaminophen (PERCOCET) 5-325 mg per tablet 1 Tab  1 Tab Oral Q4H PRN  
 HYDROmorphone (PF) (DILAUDID) injection 0.5 mg  0.5 mg IntraVENous Q4H PRN  prochlorperazine (COMPAZINE) injection 10 mg  10 mg IntraVENous Q6H PRN  
 zolpidem (AMBIEN) tablet 5 mg  5 mg Oral QHS PRN  
 insulin lispro (HUMALOG) injection   SubCUTAneous QID WITH MEALS  glucose chewable tablet 16 g  4 Tab Oral PRN  
 dextrose (D50W) injection syrg 12.5-25 g  25-50 mL IntraVENous PRN  
 glucagon (GLUCAGEN) injection 1 mg  1 mg IntraMUSCular PRN  pantoprazole (PROTONIX) tablet 40 mg  40 mg Oral ACB&D Care Plan discussed with: Patient and Nurse Total time spent with patient: 30 minutes.  
 
Janette Freedman MD

## 2018-11-19 ENCOUNTER — APPOINTMENT (OUTPATIENT)
Dept: INTERVENTIONAL RADIOLOGY/VASCULAR | Age: 73
DRG: 516 | End: 2018-11-19
Attending: PHYSICIAN ASSISTANT
Payer: MEDICARE

## 2018-11-19 LAB
ALBUMIN SERPL-MCNC: 2.5 G/DL (ref 3.5–5)
ALBUMIN/GLOB SERPL: 0.8 {RATIO} (ref 1.1–2.2)
ALP SERPL-CCNC: 119 U/L (ref 45–117)
ALT SERPL-CCNC: 51 U/L (ref 12–78)
ANION GAP SERPL CALC-SCNC: 9 MMOL/L (ref 5–15)
AST SERPL-CCNC: 16 U/L (ref 15–37)
BASOPHILS # BLD: 0 K/UL (ref 0–0.1)
BASOPHILS NFR BLD: 0 % (ref 0–1)
BILIRUB SERPL-MCNC: 0.3 MG/DL (ref 0.2–1)
BNP SERPL-MCNC: 239 PG/ML (ref 0–125)
BUN SERPL-MCNC: 30 MG/DL (ref 6–20)
BUN/CREAT SERPL: 29 (ref 12–20)
CALCIUM SERPL-MCNC: 8.2 MG/DL (ref 8.5–10.1)
CHLORIDE SERPL-SCNC: 103 MMOL/L (ref 97–108)
CO2 SERPL-SCNC: 26 MMOL/L (ref 21–32)
CREAT SERPL-MCNC: 1.03 MG/DL (ref 0.55–1.02)
DIFFERENTIAL METHOD BLD: ABNORMAL
EOSINOPHIL # BLD: 0.2 K/UL (ref 0–0.4)
EOSINOPHIL NFR BLD: 2 % (ref 0–7)
ERYTHROCYTE [DISTWIDTH] IN BLOOD BY AUTOMATED COUNT: 15.1 % (ref 11.5–14.5)
GLOBULIN SER CALC-MCNC: 3.2 G/DL (ref 2–4)
GLUCOSE BLD STRIP.AUTO-MCNC: 130 MG/DL (ref 65–100)
GLUCOSE BLD STRIP.AUTO-MCNC: 191 MG/DL (ref 65–100)
GLUCOSE BLD STRIP.AUTO-MCNC: 245 MG/DL (ref 65–100)
GLUCOSE BLD STRIP.AUTO-MCNC: 269 MG/DL (ref 65–100)
GLUCOSE SERPL-MCNC: 271 MG/DL (ref 65–100)
HCT VFR BLD AUTO: 37.5 % (ref 35–47)
HGB BLD-MCNC: 12.2 G/DL (ref 11.5–16)
IMM GRANULOCYTES # BLD: 0 K/UL
IMM GRANULOCYTES NFR BLD AUTO: 0 %
LYMPHOCYTES # BLD: 0.3 K/UL (ref 0.8–3.5)
LYMPHOCYTES NFR BLD: 3 % (ref 12–49)
MAGNESIUM SERPL-MCNC: 2 MG/DL (ref 1.6–2.4)
MCH RBC QN AUTO: 29.7 PG (ref 26–34)
MCHC RBC AUTO-ENTMCNC: 32.5 G/DL (ref 30–36.5)
MCV RBC AUTO: 91.2 FL (ref 80–99)
METAMYELOCYTES NFR BLD MANUAL: 2 %
MONOCYTES # BLD: 0.3 K/UL (ref 0–1)
MONOCYTES NFR BLD: 4 % (ref 5–13)
MYELOCYTES NFR BLD MANUAL: 1 %
NEUTS SEG # BLD: 7.3 K/UL (ref 1.8–8)
NEUTS SEG NFR BLD: 88 % (ref 32–75)
NRBC # BLD: 0 K/UL (ref 0–0.01)
NRBC BLD-RTO: 0 PER 100 WBC
PLATELET # BLD AUTO: 175 K/UL (ref 150–400)
PMV BLD AUTO: 10.7 FL (ref 8.9–12.9)
POTASSIUM SERPL-SCNC: 3.9 MMOL/L (ref 3.5–5.1)
PROT SERPL-MCNC: 5.7 G/DL (ref 6.4–8.2)
RBC # BLD AUTO: 4.11 M/UL (ref 3.8–5.2)
RBC MORPH BLD: ABNORMAL
SERVICE CMNT-IMP: ABNORMAL
SODIUM SERPL-SCNC: 138 MMOL/L (ref 136–145)
WBC # BLD AUTO: 8.3 K/UL (ref 3.6–11)

## 2018-11-19 PROCEDURE — 83735 ASSAY OF MAGNESIUM: CPT

## 2018-11-19 PROCEDURE — 80053 COMPREHEN METABOLIC PANEL: CPT

## 2018-11-19 PROCEDURE — 74011250637 HC RX REV CODE- 250/637: Performed by: INTERNAL MEDICINE

## 2018-11-19 PROCEDURE — 94660 CPAP INITIATION&MGMT: CPT

## 2018-11-19 PROCEDURE — 83880 ASSAY OF NATRIURETIC PEPTIDE: CPT

## 2018-11-19 PROCEDURE — 94760 N-INVAS EAR/PLS OXIMETRY 1: CPT

## 2018-11-19 PROCEDURE — 82962 GLUCOSE BLOOD TEST: CPT

## 2018-11-19 PROCEDURE — 94640 AIRWAY INHALATION TREATMENT: CPT

## 2018-11-19 PROCEDURE — 74011250637 HC RX REV CODE- 250/637: Performed by: PHYSICIAN ASSISTANT

## 2018-11-19 PROCEDURE — 36415 COLL VENOUS BLD VENIPUNCTURE: CPT

## 2018-11-19 PROCEDURE — 65270000029 HC RM PRIVATE

## 2018-11-19 PROCEDURE — 74011250636 HC RX REV CODE- 250/636: Performed by: SPECIALIST

## 2018-11-19 PROCEDURE — 74011636637 HC RX REV CODE- 636/637: Performed by: FAMILY MEDICINE

## 2018-11-19 PROCEDURE — 85025 COMPLETE CBC W/AUTO DIFF WBC: CPT

## 2018-11-19 PROCEDURE — 74011000250 HC RX REV CODE- 250: Performed by: INTERNAL MEDICINE

## 2018-11-19 PROCEDURE — 77030038269 HC DRN EXT URIN PURWCK BARD -A

## 2018-11-19 RX ADMIN — ENOXAPARIN SODIUM 40 MG: 40 INJECTION SUBCUTANEOUS at 09:40

## 2018-11-19 RX ADMIN — CARVEDILOL 25 MG: 12.5 TABLET, FILM COATED ORAL at 17:53

## 2018-11-19 RX ADMIN — BUDESONIDE 500 MCG: 0.5 INHALANT RESPIRATORY (INHALATION) at 20:41

## 2018-11-19 RX ADMIN — PANTOPRAZOLE SODIUM 40 MG: 40 TABLET, DELAYED RELEASE ORAL at 06:04

## 2018-11-19 RX ADMIN — ISOSORBIDE MONONITRATE 30 MG: 30 TABLET, EXTENDED RELEASE ORAL at 09:28

## 2018-11-19 RX ADMIN — DULOXETINE 60 MG: 30 CAPSULE, DELAYED RELEASE ORAL at 09:28

## 2018-11-19 RX ADMIN — Medication 10 ML: at 06:03

## 2018-11-19 RX ADMIN — SAXAGLIPTIN 5 MG: 5 TABLET, FILM COATED ORAL at 09:27

## 2018-11-19 RX ADMIN — IPRATROPIUM BROMIDE AND ALBUTEROL SULFATE 3 ML: .5; 3 SOLUTION RESPIRATORY (INHALATION) at 15:16

## 2018-11-19 RX ADMIN — MONTELUKAST SODIUM 10 MG: 10 TABLET, COATED ORAL at 09:28

## 2018-11-19 RX ADMIN — CLOPIDOGREL BISULFATE 75 MG: 75 TABLET ORAL at 09:40

## 2018-11-19 RX ADMIN — INSULIN LISPRO 4 UNITS: 100 INJECTION, SOLUTION INTRAVENOUS; SUBCUTANEOUS at 17:53

## 2018-11-19 RX ADMIN — PREDNISONE 20 MG: 20 TABLET ORAL at 17:53

## 2018-11-19 RX ADMIN — Medication 10 ML: at 21:49

## 2018-11-19 RX ADMIN — CARVEDILOL 25 MG: 12.5 TABLET, FILM COATED ORAL at 09:28

## 2018-11-19 RX ADMIN — IPRATROPIUM BROMIDE AND ALBUTEROL SULFATE 3 ML: .5; 3 SOLUTION RESPIRATORY (INHALATION) at 20:41

## 2018-11-19 RX ADMIN — INSULIN LISPRO 4 UNITS: 100 INJECTION, SOLUTION INTRAVENOUS; SUBCUTANEOUS at 21:48

## 2018-11-19 RX ADMIN — PREDNISONE 20 MG: 20 TABLET ORAL at 09:27

## 2018-11-19 RX ADMIN — LISINOPRIL 5 MG: 5 TABLET ORAL at 09:28

## 2018-11-19 RX ADMIN — IPRATROPIUM BROMIDE AND ALBUTEROL SULFATE 3 ML: .5; 3 SOLUTION RESPIRATORY (INHALATION) at 07:52

## 2018-11-19 RX ADMIN — INSULIN LISPRO 3 UNITS: 100 INJECTION, SOLUTION INTRAVENOUS; SUBCUTANEOUS at 09:19

## 2018-11-19 RX ADMIN — PANTOPRAZOLE SODIUM 40 MG: 40 TABLET, DELAYED RELEASE ORAL at 15:40

## 2018-11-19 RX ADMIN — BUDESONIDE 500 MCG: 0.5 INHALANT RESPIRATORY (INHALATION) at 07:52

## 2018-11-19 RX ADMIN — Medication 10 ML: at 14:00

## 2018-11-19 NOTE — PROGRESS NOTES
IR 
Imaging and chart reviewed. I have discussed with the ortho-PA the options and concerns for kyphoplasty at the T7 and T6 levels in this patient on Plavix that cardiology says cannot come off Plavix. Risk of hemorrhage causing paralysis is small but if it were to occurred would require emergency spine surgery. Spine surgery service would need to determine that the risk-benefit in favor of kyphoplasty before we should proceed with kyphoplasty. Otherwise conservative treatment maybe best option.

## 2018-11-19 NOTE — PROGRESS NOTES
Physical Therapy orders acknowledged, chart reviewed and discussed with nurse patient transferred to 5th floor earlier. Patient ok if she stay flat on his back but if she moves it starts to hurts. Kyphoplasty surgery cancelled today patient asked rest for today and maybe try the TLSO tomorrow. We will continue to follow up with the patient for therapy. Thank you.

## 2018-11-19 NOTE — PROGRESS NOTES
Name: Ann Banks: [address] [place] [address] Dr  
: 1945 Admit Date: 2018 Phone: [phone]  Room: [facility]/01 PCP: Marshall Callejas MD  MRN: [id number] Date: 2018  Code: Full Code Chart and notes reviewed. Data reviewed. I review the patient's current medications in the medical record at each encounter. I have evaluated and examined the patient. Overnight events Afebrile Sats 97% on 3L Creat 1.03 
proBNP 239 (was 470 on 11/3) RVP negative V/Q scan low probability MRI with acute T7 vertebral compression deformity with greater than 50% loss of vertebral body height. Mild cortical retropulsion at T7 with mild to moderate canal stenosis at T7. There is marrow edema along the anterior inferior endplate of T6. ROS: Feeling hopeful about getting her procedure today. Denies SOB or wheeze. Denies CP or palpitations. Denies fever, chills, or cough. Denies abd pain or LE pain/swelling. Still with back pain. Past Medical History:  
Diagnosis Date  Asthma  Atrial fibrillation ([address] 2018  Autoimmune disease ([address]   
 fibromyalgia  CAD (coronary artery disease) 10/9/2015  Closed wedge compression fracture of T7 vertebra ([address] 2018  Diabetes ([address]  DVT (deep vein thrombosis) in pregnancy ([address]  GERD (gastroesophageal reflux disease)  HTN (hypertension)  NSTEMI (non-ST elevated myocardial infarction) ([address] 10/9/2015  Obesity (BMI 30-39.9) 2018  Sleep apnea   
 wears CPAP Past Surgical History:  
Procedure Laterality Date  ABDOMEN SURGERY PROC UNLISTED    
 hital hernia repair, gall bladder removed  BREAST SURGERY PROCEDURE UNLISTED    
 lumpectomy  CARDIAC SURG PROCEDURE UNLIST  10/9/15  
 2 stents [place]  HX COLOSTOMY    
 and reversal, post episiotomy repair [address] [place]  HX UROLOGICAL  2009  
 bladder sling Family History Problem Relation Age of Onset  Diabetes Mother  Heart Failure Mother  Kidney Disease Mother  Diabetes Father  Heart Disease Father  Elevated Lipids Father  Heart Failure Father  Heart Disease Brother  Cancer Brother   
     kidney  Diabetes Brother  No Known Problems Brother  No Known Problems Brother Social History Tobacco Use  Smoking status: Former Smoker Last attempt to quit: 3/30/2017 Years since quittin.6  Smokeless tobacco: Never Used Substance Use Topics  Alcohol use: No  
  Alcohol/week: 0.0 oz  
  Comment: very very rarely Allergies Allergen Reactions  Advair Diskus [Fluticasone-Salmeterol] Rash  Aspartame Other (comments)  Ciprofloxacin Rash  Statins-Hmg-Coa Reductase Inhibitors Other (comments) Muscle pain Lipitor/crestor/zocor  Sulfa (Sulfonamide Antibiotics) Rash  Tetracycline Other (comments) musclepain  Zetia [Ezetimibe] Myalgia Current Facility-Administered Medications Medication Dose Route Frequency  enoxaparin (LOVENOX) injection 40 mg  40 mg SubCUTAneous Q24H  carvedilol (COREG) tablet 25 mg  25 mg Oral BID WITH MEALS  predniSONE (DELTASONE) tablet 20 mg  20 mg Oral BID WITH MEALS  clopidogrel (PLAVIX) tablet 75 mg  75 mg Oral DAILY  arformoterol (BROVANA) neb solution 15 mcg  15 mcg Nebulization BID RT  
 budesonide (PULMICORT) 500 mcg/2 ml nebulizer suspension  500 mcg Nebulization BID RT  
 albuterol-ipratropium (DUO-NEB) 2.5 MG-0.5 MG/3 ML  3 mL Nebulization Q6H RT  
 DULoxetine (CYMBALTA) capsule 60 mg  60 mg Oral DAILY  isosorbide mononitrate ER (IMDUR) tablet 30 mg  30 mg Oral DAILY  lisinopril (PRINIVIL, ZESTRIL) tablet 5 mg  5 mg Oral DAILY  montelukast (SINGULAIR) tablet 10 mg  10 mg Oral DAILY  sAXagliptin (ONGLYZA) tablet 5 mg  5 mg Oral DAILY  sodium chloride (NS) flush 5-10 mL  5-10 mL IntraVENous Q8H  
  sodium chloride (NS) flush 5-10 mL  5-10 mL IntraVENous PRN  
 acetaminophen (TYLENOL) tablet 650 mg  650 mg Oral Q4H PRN  
 oxyCODONE-acetaminophen (PERCOCET) 5-325 mg per tablet 1 Tab  1 Tab Oral Q4H PRN  
 HYDROmorphone (PF) (DILAUDID) injection 0.5 mg  0.5 mg IntraVENous Q4H PRN  prochlorperazine (COMPAZINE) injection 10 mg  10 mg IntraVENous Q6H PRN  
 zolpidem (AMBIEN) tablet 5 mg  5 mg Oral QHS PRN  
 insulin lispro (HUMALOG) injection   SubCUTAneous QID WITH MEALS  glucose chewable tablet 16 g  4 Tab Oral PRN  
 dextrose (D50W) injection syrg 12.5-25 g  25-50 mL IntraVENous PRN  
 glucagon (GLUCAGEN) injection 1 mg  1 mg IntraMUSCular PRN  pantoprazole (PROTONIX) tablet 40 mg  40 mg Oral ACB&D REVIEW OF SYSTEMS  
12 point ROS negative except as stated in the HPI. Physical Exam:  
Visit Vitals /65 (BP 1 Location: Right arm, BP Patient Position: At rest) Pulse 83 Temp 98.5 °F (36.9 °C) Resp 18 Ht 5' 7\" (1.702 m) Wt 99.4 kg (219 lb 3.2 oz) SpO2 97% Breastfeeding? No  
BMI 35.38 kg/m² General:  Alert, cooperative, no acute distress, appears stated age. Head:  Normocephalic, without obvious abnormality, atraumatic. Eyes:  Conjunctivae/corneas clear. Nose: Nares normal. Septum midline. Mucosa normal.   
Throat: Lips, mucosa, and tongue normal.   
Neck: Supple, symmetrical, trachea midline, no adenopathy. Lungs:   Mildly diminished but clear. No wheeze or rales. Chest wall:  No tenderness or deformity. Heart:  Regular rate and rhythm, S1, S2 normal, no murmur, click, rub or gallop. Abdomen:   Obese, soft, non-tender. Bowel sounds normal. No masses,  No organomegaly. Extremities: Extremities normal, atraumatic, no cyanosis or edema. Pulses: 2+ and symmetric all extremities. Skin: Skin color, texture, turgor normal. No rashes or lesions Lymph nodes: Cervical, supraclavicular nodes normal.  
Neurologic: Grossly nonfocal  
 
 
 Lab Results Component Value Date/Time Sodium 138 11/19/2018 12:55 AM  
 Potassium 3.9 11/19/2018 12:55 AM  
 Chloride 103 11/19/2018 12:55 AM  
 CO2 26 11/19/2018 12:55 AM  
 BUN 30 (H) 11/19/2018 12:55 AM  
 Creatinine 1.03 (H) 11/19/2018 12:55 AM  
 Glucose 271 (H) 11/19/2018 12:55 AM  
 Calcium 8.2 (L) 11/19/2018 12:55 AM  
 Magnesium 2.0 11/19/2018 12:55 AM  
 Lactic acid 1.4 06/28/2018 04:22 AM  
 
 
Lab Results Component Value Date/Time WBC 8.3 11/19/2018 12:55 AM  
 HGB 12.2 11/19/2018 12:55 AM  
 PLATELET 888 15/29/7341 12:55 AM  
 MCV 91.2 11/19/2018 12:55 AM  
 
 
Lab Results Component Value Date/Time INR 1.1 10/17/2018 12:53 PM  
 AST (SGOT) 16 11/19/2018 12:55 AM  
 Alk. phosphatase 119 (H) 11/19/2018 12:55 AM  
 Protein, total 5.7 (L) 11/19/2018 12:55 AM  
 Albumin 2.5 (L) 11/19/2018 12:55 AM  
 Globulin 3.2 11/19/2018 12:55 AM  
 
 
No results found for: IRON, FE, TIBC, IBCT, PSAT, FERR Lab Results Component Value Date/Time TSH 0.58 11/13/2018 03:26 PM  
  
 
No results found for: PH, PHI, PCO2, PCO2I, PO2, PO2I, HCO3, HCO3I, FIO2, FIO2I Lab Results Component Value Date/Time CK 25 (L) 11/13/2018 03:26 PM  
 CK-MB Index 4.4 (H) 11/13/2018 03:26 PM  
 Troponin-I, Qt. <0.05 11/13/2018 03:26 PM  
  
 
Lab Results Component Value Date/Time Culture result: ESCHERICHIA COLI (PREDOMINATING) (A) 06/28/2018 07:01 AM  
 Culture result: ENTEROBACTER AEROGENES (A) 06/28/2018 07:01 AM  
 Culture result: (A) 06/28/2018 04:13 AM  
  ENTEROBACTER AEROGENES GROWING IN 1 OF 4 BOTTLES DRAWN (SITE=L UPPER ARM) Culture result: (A) 06/28/2018 04:13 AM  
  PRELIMINARY REPORT OF 
GRAM NEGATIVE COCCOBACILLI 
GROWING IN 1 OF 4 BOTTLES DRAWN 
CALLED TO AND READ BACK BY 
CHANG HUNTLEY RN [facility] AT 1317 ON 6/28/2018. [place] 0668  Culture result: REMAINING BOTTLE(S) HAS/HAVE NO GROWTH IN 5 DAYS 06/28/2018 04:13 AM  
 
 
No results found for: TOXA1, RPR, HBCM, HBSAG, HAAB, HCAB1, HAAT, G6PD, CRYAC, HIVGT, HIVR, HIV1, HIV12, HIVPC, HIVRPI Lab Results Component Value Date/Time CK 25 (L) 11/13/2018 03:26 PM  
 
 
Lab Results Component Value Date/Time Color YELLOW/STRAW 11/13/2018 07:58 PM  
 Appearance CLOUDY (A) 11/13/2018 07:58 PM  
 Specific gravity 1.020 04/16/2017 01:13 PM  
 pH (UA) 5.0 11/13/2018 07:58 PM  
 Protein TRACE (A) 11/13/2018 07:58 PM  
 Glucose >1,000 (A) 11/13/2018 07:58 PM  
 Ketone NEGATIVE  11/13/2018 07:58 PM  
 Bilirubin NEGATIVE  11/13/2018 07:58 PM  
 Blood TRACE (A) 11/13/2018 07:58 PM  
 Urobilinogen 0.2 11/13/2018 07:58 PM  
 Nitrites NEGATIVE  11/13/2018 07:58 PM  
 Leukocyte Esterase MODERATE (A) 11/13/2018 07:58 PM  
 WBC  11/13/2018 07:58 PM  
 RBC 0-5 11/13/2018 07:58 PM  
 Bacteria 4+ (A) 11/13/2018 07:58 PM  
 
 
Images: no new images this morning IMPRESSION 
· COPD exacerbation · Shortness of breath · Pulmonary hypertension, likely secondary to underlying obstructive lung disease and HAYES, but does have a history of CTD · History of DVT, on Xarelto · NAIDA · Closed wedge compression fracture of T7 vertebra · Afib with RVR: currently SR 
 
PLAN 
· Continue Coreg for rate control per cardioloy · Wean O2 for goal sats >90% · Duonebs; brovana/pulmicort · Singulair · Prednisone taper · No indication for antibiotics at this time · Gentle IVFs; trend creat · Cardiac diet · Ortho following, kyphoplasty planned for this morning. · Will need to consider treatment of PH if symptoms persist 
· CPAP nightly and with naps for known HAYES · PPI 
· On xarelto chronically, plavix for stents. Cardiology ok with holding anticoagulation for procedure, but not Plavix.   
 
 
Elisabeth Shenma

## 2018-11-19 NOTE — PROGRESS NOTES
Daily Progress Note: 11/19/2018 Page Clark MD 
 
Assessment/Plan: Dyspnea (11/13/2018) improved - workup with Pul and Card. - Pulmonary following and now on po steroids, V/Q scan with low prob for PE 
            - consulted Orthopedics - Kyphoplasty planned 11/19  
  
  Type II diabetes mellitus ([facility] (10/9/2015) - with reported nephropathy with proteinuria without CKD 
            - continue saxagliptin and follow on SSI 
  
  Coronary artery disease involving native coronary artery without angina pectoris (1/22/2016) - stable, continue cardiac meds 
            - remain on Plavix 
  
  Essential hypertension (1/22/2016) - cont home meds - adjust as needed 
  
  Chronic anticoagulation (3/30/2018) - holding Xarelto for possible kyphoplasty- on Lovenox 
  
  Obesity, BMI 30-39. 9([facility]) (10/1/2018) - counseled on weight loss, this is contributing to her current symptoms 
            - looks very [place]wickian  
  
  ARF (acute renal failure) ([facility] (11/13/2018) - creatinine up acutely from prior, possibly due to diuresis 
            - hold diuretic  
  
  Closed wedge compression fracture of T7 vertebra ([facility] (11/13/2018) - consulted Orthopedics, hopefully she will be a candidate for kyphoplasty 
            - cardiology ok with holding Lovenox/Xarelto for procedure but would like to continue               Plavix uninterrupted Code status: 
            - patient is FULL CODE, no AMD on file, her daughter Kimberley Lovell is her surrogate 
   
 
Problem List: 
Problem List as of 11/19/2018 Date Reviewed: 11/13/2018 Codes Class Noted - Resolved * (Principal) Dyspnea ICD-10-CM: R06.00 
ICD-9-CM: 786.09  11/13/2018 - Present ARF (acute renal failure) (HCC) ICD-10-CM: N17.9 ICD-9-CM: 584.9  11/13/2018 - Present Obesity (BMI 30-39.9) (Chronic) ICD-10-CM: I67.4 ICD-9-CM: 278.00  11/13/2018 - Present Closed wedge compression fracture of T7 vertebra ([facility] ICD-10-CM: K02.232D ICD-9-CM: 805.2  11/13/2018 - Present Type 2 diabetes with nephropathy ([facility] ICD-10-CM: E11.21 
ICD-9-CM: 250.40, 583.81  10/1/2018 - Present Chest pain ICD-10-CM: R07.9 ICD-9-CM: 786.50  6/27/2018 - Present Generalized abdominal pain ICD-10-CM: R10.84 ICD-9-CM: 789.07  6/27/2018 - Present Recurrent deep vein thrombosis (DVT) (HCC) ICD-10-CM: I82.409 ICD-9-CM: 453.40  3/30/2018 - Present Chronic anticoagulation (Chronic) ICD-10-CM: Z79.01 
ICD-9-CM: V58.61  3/30/2018 - Present Coronary artery disease involving native coronary artery without angina pectoris (Chronic) ICD-10-CM: I25.10 ICD-9-CM: 414.01  1/22/2016 - Present Essential hypertension (Chronic) ICD-10-CM: I10 
ICD-9-CM: 401.9  1/22/2016 - Present Type II diabetes mellitus (HCC) (Chronic) ICD-10-CM: E11.9 ICD-9-CM: 250.00  10/9/2015 - Present NSTEMI (non-ST elevated myocardial infarction) ([facility] ICD-10-CM: I21.4 ICD-9-CM: 410.70  10/9/2015 - Present RESOLVED: CAD (coronary artery disease) ICD-10-CM: I25.10 ICD-9-CM: 414.00  10/9/2015 - 1/22/2016 RESOLVED: HTN (hypertension) ICD-10-CM: I10 
ICD-9-CM: 401.9  10/9/2015 - 1/22/2016 Subjective:  
  81 y.o.  female who is admitted with SOB. Ms. Leon Howell presented to the Emergency Department this PM complaining of SOB: for the past 3 months, worked up by Pulmonary and Cardiology as an outpatient without any definite diagnosis, not hypoxic, associated with back pain, unable to take a deep breath without pain, back pain started at the same time as the SOB. History obtained from chart review and the patient. Previous records reviewed, recent admit for chest pain. (Dr Edilberto Reaves). :  Feels much better this AM with less back pain on meds. Still very tender over site with any palpation. Less air hunger but still \"not breathing back to normal yet. \"  Glucoses up with the steroids. Pul and Card seeing also. 11/15:    Reports she continues to feel better and reports no longer SOB. She is not moving much due to back pain. No chest pains. Ortho to decide next step for compression fx. No complaints of weakness or numbness LEs. 
 
:  No longer short of breath - will wean steroids to po. IR plans kyphoplasty today. May benefit from Prolia for osteoporosis outpt  
 
18: Transferred to ICU yesterday for A-fib w/ RVR. Converted after Dilt bolus. Now sinus tach in the 100s. Coreg increased this am. She is not requiring a lot of pain meds but also not moving a lot. Xarelto being held and she is on Lovenox now. 18: Transferred out of ICU yesterday. Having pain with any movement. Dr Mali Chisholm would like her to stay on Plavix but is ok holding Xarelto/Lovenox for kyphoplasty. Will need to see if IR can do kyphoplasty on Plavix. Ortho getting her fitted for brace. Breathing well. No SOB. :  Little pain unless she moves about. Kyphoplasty planned for today.   
  
Review of Systems: A comprehensive review of systems was negative except for that written in the HPI. Objective:  
Physical Exam:  
 
Visit Vitals BP (!) 163/91 (BP 1 Location: Right arm, BP Patient Position: At rest) Pulse 85 Temp 97.6 °F (36.4 °C) Resp 18 Ht 5' 7\" (1.702 m) Wt 99.4 kg (219 lb 3.2 oz) SpO2 95% Breastfeeding? No  
BMI 35.38 kg/m² O2 Flow Rate (L/min): 3 l/min O2 Device: CPAP mask Temp (24hrs), Av °F (36.7 °C), Min:97.6 °F (36.4 °C), Max:98.8 °F (37.1 °C) No intake/output data recorded.  1901 -  0700 In: 500 [P.O.:500] Out:  [Urine:] General:  Alert, cooperative, obese, no distress, appears stated age. Head:  Normocephalic, without obvious abnormality, atraumatic. Eyes:  Conjunctivae/corneas clear. PERRL, EOMs intact. Throat: Lips, mucosa, and tongue moist.. Neck: Supple, symmetrical, trachea midline, no adenopathy, thyroid: no enlargement/tenderness/nodules, no carotid bruit and no JVD. Back:   Symmetric,with marked tenderness to palpation over T6-7-8 area. Lungs:   Diminished BS bilaterally but otherwise clear at this time. Chest wall:  No tenderness or deformity. Heart:  Irregular rate and rhythm, no murmur, click, rub or gallop. Abdomen:   Soft, non-tender. Bowel sounds normal. No masses,  No organomegaly. Extremities: no cyanosis. Trace LE edema. No calf tenderness or cords. Skin:  turgor normal.   
Neurologic: CNII-XII intact. Alert and oriented X 3. Fine motor of hands and fingers normal.   equal.  No cogwheeling or rigidity. Gait not tested at this time. Sensation grossly normal to touch. Gross motor of extremities normal.    
 
Data Review:  
  CT of Chest 11/13/18 FINDINGS: 
THYROID: No nodule. MEDIASTINUM: No mass or lymphadenopathy. SONJA: No mass or lymphadenopathy. THORACIC AORTA: No aneurysm. Mild vascular calcifications MAIN PULMONARY ARTERY: Normal in caliber. TRACHEA/BRONCHI: Patent. ESOPHAGUS: No wall thickening or dilatation. Small hiatal hernia HEART: Normal in size. PLEURA: No effusion or pneumothorax. LUNGS: No nodule, mass, or airspace disease. Bilateral atelectatic changes. INCIDENTALLY IMAGED UPPER ABDOMEN: No focal abnormality. BONES: Bones are osteopenic. There is collapse of the superior endplate of T7. This has occurred since June 2018 Possible milk of calcium cyst within the right breast 
IMPRESSION: 
1. No acute cardiopulmonary disease 2. Interval collapse superior endplate of T7. The patient has back pain this may 
be acute to subacute and MR imaging would better characterize 
  
Recent Days: 
Recent Labs 11/19/18 
0055 11/18/18 0104 11/17/18 
7175 WBC 8.3 9.3 9.9 HGB 12.2 12.0 12.1 HCT 37.5 37.2 38.9  154 157 Recent Labs 11/19/18 
2267 11/18/18 
0104 11/17/18 
8167  138 141  
K 3.9 4.2 4.6  105 106 CO2 26 26 26 * 246* 258* BUN 30* 28* 30* CREA 1.03* 0.98 1.03* CA 8.2* 8.3* 8.1*  
MG 2.0 2.0 2.2 ALB 2.5* 2.4* 2.6* TBILI 0.3 0.3 0.4 SGOT 16 19 16 ALT 51 54 57 No results for input(s): PH, PCO2, PO2, HCO3, FIO2 in the last 72 hours. 24 Hour Results: 
Recent Results (from the past 24 hour(s)) GLUCOSE, POC Collection Time: 11/18/18 11:40 AM  
Result Value Ref Range Glucose (POC) 180 (H) 65 - 100 mg/dL Performed by Lawyer Jama (PCT) GLUCOSE, POC Collection Time: 11/18/18  4:12 PM  
Result Value Ref Range Glucose (POC) 324 (H) 65 - 100 mg/dL Performed by Otilia Magana (PCT) GLUCOSE, POC Collection Time: 11/18/18  8:56 PM  
Result Value Ref Range Glucose (POC) 291 (H) 65 - 100 mg/dL Performed by Elyse Justice NT-PRO BNP Collection Time: 11/19/18 12:55 AM  
Result Value Ref Range NT pro- (H) 0 - 125 PG/ML  
CBC WITH AUTOMATED DIFF Collection Time: 11/19/18 12:55 AM  
Result Value Ref Range WBC 8.3 3.6 - 11.0 K/uL  
 RBC 4.11 3.80 - 5.20 M/uL  
 HGB 12.2 11.5 - 16.0 g/dL HCT 37.5 35.0 - 47.0 % MCV 91.2 80.0 - 99.0 FL  
 MCH 29.7 26.0 - 34.0 PG  
 MCHC 32.5 30.0 - 36.5 g/dL  
 RDW 15.1 (H) 11.5 - 14.5 % PLATELET 425 405 - 873 K/uL MPV 10.7 8.9 - 12.9 FL  
 NRBC 0.0 0  WBC ABSOLUTE NRBC 0.00 0.00 - 0.01 K/uL NEUTROPHILS 88 (H) 32 - 75 % LYMPHOCYTES 3 (L) 12 - 49 % MONOCYTES 4 (L) 5 - 13 % EOSINOPHILS 2 0 - 7 % BASOPHILS 0 0 - 1 % METAMYELOCYTES 2 (H) 0 % MYELOCYTES 1 (H) 0 % IMMATURE GRANULOCYTES 0 %  
 ABS. NEUTROPHILS 7.3 1.8 - 8.0 K/UL  
 ABS. LYMPHOCYTES 0.3 (L) 0.8 - 3.5 K/UL  
 ABS. MONOCYTES 0.3 0.0 - 1.0 K/UL  
 ABS. EOSINOPHILS 0.2 0.0 - 0.4 K/UL ABS. BASOPHILS 0.0 0.0 - 0.1 K/UL  
 ABS. IMM. GRANS. 0.0 K/UL  
 DF MANUAL    
 RBC COMMENTS NORMOCYTIC, NORMOCHROMIC METABOLIC PANEL, COMPREHENSIVE Collection Time: 11/19/18 12:55 AM  
Result Value Ref Range Sodium 138 136 - 145 mmol/L Potassium 3.9 3.5 - 5.1 mmol/L Chloride 103 97 - 108 mmol/L  
 CO2 26 21 - 32 mmol/L Anion gap 9 5 - 15 mmol/L Glucose 271 (H) 65 - 100 mg/dL BUN 30 (H) 6 - 20 MG/DL Creatinine 1.03 (H) 0.55 - 1.02 MG/DL  
 BUN/Creatinine ratio 29 (H) 12 - 20 GFR est AA >60 >60 ml/min/1.73m2 GFR est non-AA 53 (L) >60 ml/min/1.73m2 Calcium 8.2 (L) 8.5 - 10.1 MG/DL Bilirubin, total 0.3 0.2 - 1.0 MG/DL  
 ALT (SGPT) 51 12 - 78 U/L  
 AST (SGOT) 16 15 - 37 U/L Alk. phosphatase 119 (H) 45 - 117 U/L Protein, total 5.7 (L) 6.4 - 8.2 g/dL Albumin 2.5 (L) 3.5 - 5.0 g/dL Globulin 3.2 2.0 - 4.0 g/dL A-G Ratio 0.8 (L) 1.1 - 2.2 MAGNESIUM Collection Time: 11/19/18 12:55 AM  
Result Value Ref Range Magnesium 2.0 1.6 - 2.4 mg/dL GLUCOSE, POC Collection Time: 11/19/18  6:25 AM  
Result Value Ref Range Glucose (POC) 191 (H) 65 - 100 mg/dL Performed by Hieu Ceballos Medications reviewed Current Facility-Administered Medications Medication Dose Route Frequency  enoxaparin (LOVENOX) injection 40 mg  40 mg SubCUTAneous Q24H  carvedilol (COREG) tablet 25 mg  25 mg Oral BID WITH MEALS  predniSONE (DELTASONE) tablet 20 mg  20 mg Oral BID WITH MEALS  clopidogrel (PLAVIX) tablet 75 mg  75 mg Oral DAILY  arformoterol (BROVANA) neb solution 15 mcg  15 mcg Nebulization BID RT  
 budesonide (PULMICORT) 500 mcg/2 ml nebulizer suspension  500 mcg Nebulization BID RT  
 albuterol-ipratropium (DUO-NEB) 2.5 MG-0.5 MG/3 ML  3 mL Nebulization Q6H RT  
 DULoxetine (CYMBALTA) capsule 60 mg  60 mg Oral DAILY  isosorbide mononitrate ER (IMDUR) tablet 30 mg  30 mg Oral DAILY  lisinopril (PRINIVIL, ZESTRIL) tablet 5 mg  5 mg Oral DAILY  montelukast (SINGULAIR) tablet 10 mg  10 mg Oral DAILY  sAXagliptin (ONGLYZA) tablet 5 mg  5 mg Oral DAILY  sodium chloride (NS) flush 5-10 mL  5-10 mL IntraVENous Q8H  
 sodium chloride (NS) flush 5-10 mL  5-10 mL IntraVENous PRN  
 acetaminophen (TYLENOL) tablet 650 mg  650 mg Oral Q4H PRN  
 oxyCODONE-acetaminophen (PERCOCET) 5-325 mg per tablet 1 Tab  1 Tab Oral Q4H PRN  
 HYDROmorphone (PF) (DILAUDID) injection 0.5 mg  0.5 mg IntraVENous Q4H PRN  prochlorperazine (COMPAZINE) injection 10 mg  10 mg IntraVENous Q6H PRN  
 zolpidem (AMBIEN) tablet 5 mg  5 mg Oral QHS PRN  
 insulin lispro (HUMALOG) injection   SubCUTAneous QID WITH MEALS  glucose chewable tablet 16 g  4 Tab Oral PRN  
 dextrose (D50W) injection syrg 12.5-25 g  25-50 mL IntraVENous PRN  
 glucagon (GLUCAGEN) injection 1 mg  1 mg IntraMUSCular PRN  pantoprazole (PROTONIX) tablet 40 mg  40 mg Oral ACB&D Care Plan discussed with: Patient and Nurse Total time spent with patient: 30 minutes.  
 
Shannon Vance MD

## 2018-11-19 NOTE — PROGRESS NOTES
Chart reviewed, noted that patient is to have kyphoplasty today. I will continue to follow and assist with discharge planning.  CINDI Esparza

## 2018-11-19 NOTE — PROGRESS NOTES
Bedside and Verbal shift change report given to Fox Wells RN (oncoming nurse) by Nadia San RN (offgoing nurse). Report included the following information SBAR, Kardex, ED Summary, Intake/Output, Recent Results, Med Rec Status and Cardiac Rhythm NSR. 
 
2130: Pt placed NPO past Midnight for Kyphoplasty in AM. Discussed with pt the need for Chlorhexidine bath in early AM. Pt in agreement, compliant with her care. 0515: Provided pt with Chlorhexidine bath; gave back rub, changed linen, gown. Pt refused to be repositioned. Bedside and Verbal shift change report given to Hayde Yancey RN (oncoming nurse) by Fox Wells RN (offgoing nurse). Report included the following information SBAR, Kardex, ED Summary, Intake/Output, Recent Results, Med Rec Status and Cardiac Rhythm NSR.

## 2018-11-19 NOTE — PROGRESS NOTES
Bedside and Verbal shift change report given to [place] (oncoming nurse) by Justin Infante (offgoing nurse). Report included the following information SBAR, Kardex and Cardiac Rhythm NSr.  
 
0830 spoke to Tracey in cath/angio about patient going for procedure today. Tracey needs to clarify with provider if patient is OK for kyphoplasty r/t recent anticoagulant meds. 1002 spoke to Dr. Bridget Man, he is currently working with/consulting ortho and attending physician to determine whether patient will be able to have kyphoplasty today. 1050 spoke to Ortho PA, patient can have TLSO brace on today and into chair. Notified Saige Quintanilla PT of this information. TRANSFER - OUT REPORT: 
 
Verbal report given to Vijaya(name) on Francis Zambrano  being transferred to Med surg(unit) for routine progression of care Report consisted of patients Situation, Background, Assessment and  
Recommendations(SBAR). Information from the following report(s) SBAR, Kardex and Cardiac Rhythm NSR, hx a fib was reviewed with the receiving nurse. Lines:  
Peripheral IV 11/16/18 Left;Upper Arm (Active) Site Assessment Clean, dry, & intact 11/19/2018  8:13 AM  
Phlebitis Assessment 0 11/19/2018  8:13 AM  
Infiltration Assessment 0 11/19/2018  8:13 AM  
Dressing Status Clean, dry, & intact 11/19/2018  8:13 AM  
Dressing Type Tape;Transparent 11/19/2018  8:13 AM  
Hub Color/Line Status Pink 11/19/2018  8:13 AM  
Action Taken Open ports on tubing capped 11/19/2018  8:13 AM  
Alcohol Cap Used Yes 11/19/2018  8:13 AM  
  
 
Opportunity for questions and clarification was provided. Patient transported with: 
 O2 @ 3 liters Tech

## 2018-11-19 NOTE — ROUTINE PROCESS
Received call from 3rd floor asking what time kyphoplasty will be done today. No kyphoplasty scheduled secondary to patient on plavix. Cardiology consulted and does not want patient to come off plavix because recent cardiac stents were placed. Discussed all the above with RODOLFO Chavez. Dr. Cindi Perez will discuss case with others in his group and with attending and Mariangel Rich to discuss risks with performing kyphoplasty while on plavix. Notified MADAI Mohamud of all the above. Advised to let the patient eat because kyphoplasty will not happen today. May give plavix and Lovenox.  Will tentatively plan for kyphoplasty on Wednesday with  after he confirms approval.

## 2018-11-19 NOTE — PROGRESS NOTES
Problem: Falls - Risk of 
Goal: *Absence of Falls Document Genevive Camera Fall Risk and appropriate interventions in the flowsheet. Outcome: Progressing Towards Goal 
Fall Risk Interventions: 
Mobility Interventions: Bed/chair exit alarm, Patient to call before getting OOB Medication Interventions: Bed/chair exit alarm, Evaluate medications/consider consulting pharmacy, Patient to call before getting OOB Elimination Interventions: Bed/chair exit alarm, Call light in reach, Patient to call for help with toileting needs, Toileting schedule/hourly rounds History of Falls Interventions: Bed/chair exit alarm, Consult care management for discharge planning, Door open when patient unattended, Evaluate medications/consider consulting pharmacy, Investigate reason for fall, Room close to nurse's station Problem: Pressure Injury - Risk of 
Goal: *Prevention of pressure injury Document Darrion Scale and appropriate interventions in the flowsheet. Outcome: Progressing Towards Goal 
Pressure Injury Interventions: 
  
 
Moisture Interventions: Absorbent underpads, Apply protective barrier, creams and emollients, Check for incontinence Q2 hours and as needed, Internal/External urinary devices, Maintain skin hydration (lotion/cream), Moisture barrier, Offer toileting Q_hr Activity Interventions: Pressure redistribution bed/mattress(bed type), PT/OT evaluation Mobility Interventions: Assess need for specialty bed, Float heels, HOB 30 degrees or less, Pressure redistribution bed/mattress (bed type), PT/OT evaluation, Turn and reposition approx. every two hours(pillow and wedges) Nutrition Interventions: Document food/fluid/supplement intake Friction and Shear Interventions: Apply protective barrier, creams and emollients, Feet elevated on foot rest, Foam dressings/transparent film/skin sealants, HOB 30 degrees or less, Lift team/patient mobility team, Sit at 90-degree angle

## 2018-11-20 LAB
ALBUMIN SERPL-MCNC: 2.6 G/DL (ref 3.5–5)
ALBUMIN/GLOB SERPL: 0.8 {RATIO} (ref 1.1–2.2)
ALP SERPL-CCNC: 122 U/L (ref 45–117)
ALT SERPL-CCNC: 50 U/L (ref 12–78)
ANION GAP SERPL CALC-SCNC: 8 MMOL/L (ref 5–15)
AST SERPL-CCNC: 19 U/L (ref 15–37)
BASOPHILS # BLD: 0 K/UL (ref 0–0.1)
BASOPHILS NFR BLD: 0 % (ref 0–1)
BILIRUB SERPL-MCNC: 0.5 MG/DL (ref 0.2–1)
BUN SERPL-MCNC: 27 MG/DL (ref 6–20)
BUN/CREAT SERPL: 25 (ref 12–20)
CALCIUM SERPL-MCNC: 8.7 MG/DL (ref 8.5–10.1)
CHLORIDE SERPL-SCNC: 102 MMOL/L (ref 97–108)
CO2 SERPL-SCNC: 28 MMOL/L (ref 21–32)
CREAT SERPL-MCNC: 1.1 MG/DL (ref 0.55–1.02)
DIFFERENTIAL METHOD BLD: ABNORMAL
EOSINOPHIL # BLD: 0.1 K/UL (ref 0–0.4)
EOSINOPHIL NFR BLD: 1 % (ref 0–7)
ERYTHROCYTE [DISTWIDTH] IN BLOOD BY AUTOMATED COUNT: 15 % (ref 11.5–14.5)
GLOBULIN SER CALC-MCNC: 3.3 G/DL (ref 2–4)
GLUCOSE BLD STRIP.AUTO-MCNC: 234 MG/DL (ref 65–100)
GLUCOSE BLD STRIP.AUTO-MCNC: 242 MG/DL (ref 65–100)
GLUCOSE BLD STRIP.AUTO-MCNC: 247 MG/DL (ref 65–100)
GLUCOSE BLD STRIP.AUTO-MCNC: 310 MG/DL (ref 65–100)
GLUCOSE SERPL-MCNC: 242 MG/DL (ref 65–100)
HCT VFR BLD AUTO: 43.3 % (ref 35–47)
HGB BLD-MCNC: 13.6 G/DL (ref 11.5–16)
IMM GRANULOCYTES # BLD: 0 K/UL
IMM GRANULOCYTES NFR BLD AUTO: 0 %
LYMPHOCYTES # BLD: 1 K/UL (ref 0.8–3.5)
LYMPHOCYTES NFR BLD: 9 % (ref 12–49)
MAGNESIUM SERPL-MCNC: 2 MG/DL (ref 1.6–2.4)
MCH RBC QN AUTO: 29.1 PG (ref 26–34)
MCHC RBC AUTO-ENTMCNC: 31.4 G/DL (ref 30–36.5)
MCV RBC AUTO: 92.5 FL (ref 80–99)
METAMYELOCYTES NFR BLD MANUAL: 2 %
MONOCYTES # BLD: 0.7 K/UL (ref 0–1)
MONOCYTES NFR BLD: 6 % (ref 5–13)
NEUTS SEG # BLD: 9.1 K/UL (ref 1.8–8)
NEUTS SEG NFR BLD: 82 % (ref 32–75)
NRBC # BLD: 0 K/UL (ref 0–0.01)
NRBC BLD-RTO: 0 PER 100 WBC
PLATELET # BLD AUTO: 206 K/UL (ref 150–400)
PMV BLD AUTO: 10.3 FL (ref 8.9–12.9)
POTASSIUM SERPL-SCNC: 4.4 MMOL/L (ref 3.5–5.1)
PROT SERPL-MCNC: 5.9 G/DL (ref 6.4–8.2)
RBC # BLD AUTO: 4.68 M/UL (ref 3.8–5.2)
RBC MORPH BLD: ABNORMAL
SERVICE CMNT-IMP: ABNORMAL
SODIUM SERPL-SCNC: 138 MMOL/L (ref 136–145)
WBC # BLD AUTO: 11.1 K/UL (ref 3.6–11)

## 2018-11-20 PROCEDURE — 74011636637 HC RX REV CODE- 636/637: Performed by: FAMILY MEDICINE

## 2018-11-20 PROCEDURE — 74011250637 HC RX REV CODE- 250/637: Performed by: INTERNAL MEDICINE

## 2018-11-20 PROCEDURE — 74011250637 HC RX REV CODE- 250/637: Performed by: PHYSICIAN ASSISTANT

## 2018-11-20 PROCEDURE — 83735 ASSAY OF MAGNESIUM: CPT

## 2018-11-20 PROCEDURE — 94640 AIRWAY INHALATION TREATMENT: CPT

## 2018-11-20 PROCEDURE — 77030011256 HC DRSG MEPILEX <16IN NO BORD MOLN -A

## 2018-11-20 PROCEDURE — 74011250637 HC RX REV CODE- 250/637: Performed by: FAMILY MEDICINE

## 2018-11-20 PROCEDURE — 80053 COMPREHEN METABOLIC PANEL: CPT

## 2018-11-20 PROCEDURE — 77010033678 HC OXYGEN DAILY

## 2018-11-20 PROCEDURE — 77030038269 HC DRN EXT URIN PURWCK BARD -A

## 2018-11-20 PROCEDURE — 94660 CPAP INITIATION&MGMT: CPT

## 2018-11-20 PROCEDURE — 97161 PT EVAL LOW COMPLEX 20 MIN: CPT

## 2018-11-20 PROCEDURE — 85025 COMPLETE CBC W/AUTO DIFF WBC: CPT

## 2018-11-20 PROCEDURE — 97760 ORTHOTIC MGMT&TRAING 1ST ENC: CPT

## 2018-11-20 PROCEDURE — 94760 N-INVAS EAR/PLS OXIMETRY 1: CPT

## 2018-11-20 PROCEDURE — 65270000029 HC RM PRIVATE

## 2018-11-20 PROCEDURE — 97116 GAIT TRAINING THERAPY: CPT

## 2018-11-20 PROCEDURE — 74011000250 HC RX REV CODE- 250: Performed by: INTERNAL MEDICINE

## 2018-11-20 PROCEDURE — 82962 GLUCOSE BLOOD TEST: CPT

## 2018-11-20 PROCEDURE — L0464 TLSO 4MOD SACRO-SCAP PRE: HCPCS

## 2018-11-20 PROCEDURE — 36415 COLL VENOUS BLD VENIPUNCTURE: CPT

## 2018-11-20 PROCEDURE — 97530 THERAPEUTIC ACTIVITIES: CPT

## 2018-11-20 RX ORDER — CALCITONIN SALMON 200 [IU]/.09ML
1 SPRAY, METERED NASAL DAILY
Status: DISCONTINUED | OUTPATIENT
Start: 2018-11-20 | End: 2018-12-03 | Stop reason: HOSPADM

## 2018-11-20 RX ADMIN — IPRATROPIUM BROMIDE AND ALBUTEROL SULFATE 3 ML: .5; 3 SOLUTION RESPIRATORY (INHALATION) at 01:35

## 2018-11-20 RX ADMIN — INSULIN LISPRO 4 UNITS: 100 INJECTION, SOLUTION INTRAVENOUS; SUBCUTANEOUS at 08:00

## 2018-11-20 RX ADMIN — INSULIN LISPRO 4 UNITS: 100 INJECTION, SOLUTION INTRAVENOUS; SUBCUTANEOUS at 13:16

## 2018-11-20 RX ADMIN — CARVEDILOL 25 MG: 12.5 TABLET, FILM COATED ORAL at 16:47

## 2018-11-20 RX ADMIN — ISOSORBIDE MONONITRATE 30 MG: 30 TABLET, EXTENDED RELEASE ORAL at 08:23

## 2018-11-20 RX ADMIN — Medication 10 ML: at 22:54

## 2018-11-20 RX ADMIN — BUDESONIDE 500 MCG: 0.5 INHALANT RESPIRATORY (INHALATION) at 07:52

## 2018-11-20 RX ADMIN — MONTELUKAST SODIUM 10 MG: 10 TABLET, COATED ORAL at 08:23

## 2018-11-20 RX ADMIN — Medication 10 ML: at 06:20

## 2018-11-20 RX ADMIN — INSULIN LISPRO 2 UNITS: 100 INJECTION, SOLUTION INTRAVENOUS; SUBCUTANEOUS at 22:54

## 2018-11-20 RX ADMIN — OXYCODONE AND ACETAMINOPHEN 1 TABLET: 5; 325 TABLET ORAL at 13:16

## 2018-11-20 RX ADMIN — CARVEDILOL 25 MG: 12.5 TABLET, FILM COATED ORAL at 08:23

## 2018-11-20 RX ADMIN — PANTOPRAZOLE SODIUM 40 MG: 40 TABLET, DELAYED RELEASE ORAL at 06:20

## 2018-11-20 RX ADMIN — Medication 10 ML: at 14:00

## 2018-11-20 RX ADMIN — PANTOPRAZOLE SODIUM 40 MG: 40 TABLET, DELAYED RELEASE ORAL at 16:48

## 2018-11-20 RX ADMIN — INSULIN LISPRO 10 UNITS: 100 INJECTION, SOLUTION INTRAVENOUS; SUBCUTANEOUS at 16:47

## 2018-11-20 RX ADMIN — PREDNISONE 20 MG: 20 TABLET ORAL at 08:23

## 2018-11-20 RX ADMIN — BUDESONIDE 500 MCG: 0.5 INHALANT RESPIRATORY (INHALATION) at 21:18

## 2018-11-20 RX ADMIN — PREDNISONE 20 MG: 20 TABLET ORAL at 16:48

## 2018-11-20 RX ADMIN — ACETAMINOPHEN 650 MG: 325 TABLET, FILM COATED ORAL at 02:22

## 2018-11-20 RX ADMIN — CALCITONIN SALMON 1 SPRAY: 200 SPRAY, METERED NASAL at 10:41

## 2018-11-20 RX ADMIN — DULOXETINE 60 MG: 30 CAPSULE, DELAYED RELEASE ORAL at 08:23

## 2018-11-20 RX ADMIN — CLOPIDOGREL BISULFATE 75 MG: 75 TABLET ORAL at 08:23

## 2018-11-20 RX ADMIN — SAXAGLIPTIN 5 MG: 5 TABLET, FILM COATED ORAL at 08:34

## 2018-11-20 RX ADMIN — IPRATROPIUM BROMIDE AND ALBUTEROL SULFATE 3 ML: .5; 3 SOLUTION RESPIRATORY (INHALATION) at 07:52

## 2018-11-20 RX ADMIN — LISINOPRIL 5 MG: 5 TABLET ORAL at 08:23

## 2018-11-20 RX ADMIN — IPRATROPIUM BROMIDE AND ALBUTEROL SULFATE 3 ML: .5; 3 SOLUTION RESPIRATORY (INHALATION) at 21:18

## 2018-11-20 RX ADMIN — IPRATROPIUM BROMIDE AND ALBUTEROL SULFATE 3 ML: .5; 3 SOLUTION RESPIRATORY (INHALATION) at 13:36

## 2018-11-20 NOTE — PROGRESS NOTES
Bedside and Verbal shift change report given to Shaila Clarke  (oncoming nurse) by Robbin Castañeda  (offgoing nurse). Report included the following information SBAR and Kardex.

## 2018-11-20 NOTE — PROGRESS NOTES
Daily Progress Note: 11/20/2018 Luanne Evans MD 
 
Assessment/Plan: Dyspnea (11/13/2018) improved - workup with Pul and Card. - Pulmonary following and now on po steroids, V/Q scan with low prob for PE 
            - consulted Orthopedics - Kyphoplasty held due to being on Plavix 
  
  Type II diabetes mellitus ([facility] (10/9/2015) - with reported nephropathy with proteinuria without CKD 
            - continue saxagliptin and follow on SSI 
  
  Coronary artery disease involving native coronary artery without angina pectoris (1/22/2016) - stable, continue cardiac meds 
            - remain on Plavix 
  
  Essential hypertension (1/22/2016) - cont home meds - adjust as needed 
  
  Chronic anticoagulation (3/30/2018) - holding Xarelto for possible kyphoplasty- on Lovenox 
  
  Obesity, BMI 30-39. 9([facility]) (10/1/2018) - counseled on weight loss, this is contributing to her current symptoms 
            - looks very Pickwickian  
  
  ARF (acute renal failure) ([facility] (11/13/2018) - creatinine up acutely from prior, possibly due to diuresis 
            - hold diuretic  
  
  Closed wedge compression fracture of T7 vertebra ([facility] (11/13/2018) - consulted Orthopedics, hopefully she will be a candidate for kyphoplasty 
            - cardiology ok with holding Lovenox/Xarelto for procedure but would like to continue               Plavix uninterrupted Code status: 
            - patient is FULL CODE, no AMD on file, her daughter Tono Paez is her surrogate 
   
 
Problem List: 
Problem List as of 11/20/2018 Date Reviewed: 11/13/2018 Codes Class Noted - Resolved * (Principal) Dyspnea ICD-10-CM: R06.00 
ICD-9-CM: 786.09  11/13/2018 - Present ARF (acute renal failure) (HCC) ICD-10-CM: N17.9 ICD-9-CM: 584.9  11/13/2018 - Present Obesity (BMI 30-39.9) (Chronic) ICD-10-CM: F25.9 ICD-9-CM: 278.00  11/13/2018 - Present Closed wedge compression fracture of T7 vertebra ([facility] ICD-10-CM: U09.826N ICD-9-CM: 805.2  11/13/2018 - Present Type 2 diabetes with nephropathy ([facility] ICD-10-CM: E11.21 
ICD-9-CM: 250.40, 583.81  10/1/2018 - Present Chest pain ICD-10-CM: R07.9 ICD-9-CM: 786.50  6/27/2018 - Present Generalized abdominal pain ICD-10-CM: R10.84 ICD-9-CM: 789.07  6/27/2018 - Present Recurrent deep vein thrombosis (DVT) (HCC) ICD-10-CM: I82.409 ICD-9-CM: 453.40  3/30/2018 - Present Chronic anticoagulation (Chronic) ICD-10-CM: Z79.01 
ICD-9-CM: V58.61  3/30/2018 - Present Coronary artery disease involving native coronary artery without angina pectoris (Chronic) ICD-10-CM: I25.10 ICD-9-CM: 414.01  1/22/2016 - Present Essential hypertension (Chronic) ICD-10-CM: I10 
ICD-9-CM: 401.9  1/22/2016 - Present Type II diabetes mellitus (HCC) (Chronic) ICD-10-CM: E11.9 ICD-9-CM: 250.00  10/9/2015 - Present NSTEMI (non-ST elevated myocardial infarction) ([facility] ICD-10-CM: I21.4 ICD-9-CM: 410.70  10/9/2015 - Present RESOLVED: CAD (coronary artery disease) ICD-10-CM: I25.10 ICD-9-CM: 414.00  10/9/2015 - 1/22/2016 RESOLVED: HTN (hypertension) ICD-10-CM: I10 
ICD-9-CM: 401.9  10/9/2015 - 1/22/2016 Subjective:  
  81 y.o.  female who is admitted with SOB. Ms. Kodi Hawk presented to the Emergency Department this PM complaining of SOB: for the past 3 months, worked up by Pulmonary and Cardiology as an outpatient without any definite diagnosis, not hypoxic, associated with back pain, unable to take a deep breath without pain, back pain started at the same time as the SOB. History obtained from chart review and the patient. Previous records reviewed, recent admit for chest pain. (Dr Juanito Franco). :  Feels much better this AM with less back pain on meds. Still very tender over site with any palpation. Less air hunger but still \"not breathing back to normal yet. \"  Glucoses up with the steroids. Pul and Card seeing also. 11/15:    Reports she continues to feel better and reports no longer SOB. She is not moving much due to back pain. No chest pains. Ortho to decide next step for compression fx. No complaints of weakness or numbness LEs. 
 
:  No longer short of breath - will wean steroids to po. IR plans kyphoplasty today. May benefit from Prolia for osteoporosis outpt  
 
18: Transferred to ICU yesterday for A-fib w/ RVR. Converted after Dilt bolus. Now sinus tach in the 100s. Coreg increased this am. She is not requiring a lot of pain meds but also not moving a lot. Xarelto being held and she is on Lovenox now. 18: Transferred out of ICU yesterday. Having pain with any movement. Dr Gamal Fonseca would like her to stay on Plavix but is ok holding Xarelto/Lovenox for kyphoplasty. Will need to see if IR can do kyphoplasty on Plavix. Ortho getting her fitted for brace. Breathing well. No SOB. :  Little pain unless she moves about. Kyphoplasty planned for today. :  Still c/o back pain. Unable to have Kyphoplasty yesterday due to being on Plavix. Pt unable to do ADLs at this point and may need rehab if unable to have kyphoplasty. Breathing issues have resolved for now.   
  
Review of Systems: A comprehensive review of systems was negative except for that written in the HPI. Objective:  
Physical Exam:  
 
Visit Vitals /77 (BP 1 Location: Right arm, BP Patient Position: At rest) Pulse 99 Temp 98 °F (36.7 °C) Resp 16 Ht 5' 7\" (1.702 m) Wt 99.4 kg (219 lb 3.2 oz) SpO2 96% Breastfeeding? No  
BMI 35.38 kg/m² O2 Flow Rate (L/min): 2 l/min O2 Device: CPAP mask Temp (24hrs), Av °F (36.7 °C), Min:97.8 °F (36.6 °C), Max:98.5 °F (36.9 °C) 
  11/19 1901 - 11/20 0700 In: -  
Out: 500 [Urine:500]   11/18 0701 - 11/19 1900 In: 450 [P.O.:450] Out: 1100 [Urine:1100] General:  Alert, cooperative, obese, no distress, appears stated age. Head:  Normocephalic, without obvious abnormality, atraumatic. Eyes:  Conjunctivae/corneas clear. PERRL, EOMs intact. Throat: Lips, mucosa, and tongue moist.. Neck: Supple, symmetrical, trachea midline, no adenopathy, thyroid: no enlargement/tenderness/nodules, no carotid bruit and no JVD. Back:   Symmetric,with marked tenderness to palpation over T6-7-8 area. Lungs:   Diminished BS bilaterally but otherwise clear at this time. Chest wall:  No tenderness or deformity. Heart:  Irregular rate and rhythm, no murmur, click, rub or gallop. Abdomen:   Soft, non-tender. Bowel sounds normal. No masses,  No organomegaly. Extremities: no cyanosis. Trace LE edema. No calf tenderness or cords. Skin:  turgor normal.   
Neurologic: CNII-XII intact. Alert and oriented X 3. Fine motor of hands and fingers normal.   equal.  No cogwheeling or rigidity. Gait not tested at this time. Sensation grossly normal to touch. Gross motor of extremities normal.    
 
Data Review:  
  CT of Chest 11/13/18 FINDINGS: 
THYROID: No nodule. MEDIASTINUM: No mass or lymphadenopathy. SONJA: No mass or lymphadenopathy. THORACIC AORTA: No aneurysm. Mild vascular calcifications MAIN PULMONARY ARTERY: Normal in caliber. TRACHEA/BRONCHI: Patent. ESOPHAGUS: No wall thickening or dilatation. Small hiatal hernia HEART: Normal in size. PLEURA: No effusion or pneumothorax. LUNGS: No nodule, mass, or airspace disease. Bilateral atelectatic changes. INCIDENTALLY IMAGED UPPER ABDOMEN: No focal abnormality. BONES: Bones are osteopenic. There is collapse of the superior endplate of T7. This has occurred since June 2018 Possible milk of calcium cyst within the right breast 
IMPRESSION: 
 1. No acute cardiopulmonary disease 2. Interval collapse superior endplate of T7. The patient has back pain this may 
be acute to subacute and MR imaging would better characterize 
  
Recent Days: 
Recent Labs 11/19/18 
0055 11/18/18 
0104 WBC 8.3 9.3 HGB 12.2 12.0 HCT 37.5 37.2  154 Recent Labs 11/19/18 
0055 11/18/18 
0104  138  
K 3.9 4.2  105 CO2 26 26 * 246* BUN 30* 28* CREA 1.03* 0.98  
CA 8.2* 8.3*  
MG 2.0 2.0 ALB 2.5* 2.4* TBILI 0.3 0.3 SGOT 16 19 ALT 51 54 No results for input(s): PH, PCO2, PO2, HCO3, FIO2 in the last 72 hours. 24 Hour Results: 
Recent Results (from the past 24 hour(s)) GLUCOSE, POC Collection Time: 11/19/18  6:25 AM  
Result Value Ref Range Glucose (POC) 191 (H) 65 - 100 mg/dL Performed by Hieu Ceballos GLUCOSE, POC Collection Time: 11/19/18 11:30 AM  
Result Value Ref Range Glucose (POC) 130 (H) 65 - 100 mg/dL Performed by Rosaline Martinez (PCT) GLUCOSE, POC Collection Time: 11/19/18  4:13 PM  
Result Value Ref Range Glucose (POC) 245 (H) 65 - 100 mg/dL Performed by Rosaline Martinez (PCT) GLUCOSE, POC Collection Time: 11/19/18  9:33 PM  
Result Value Ref Range Glucose (POC) 269 (H) 65 - 100 mg/dL Performed by Mariusz Zhu (PCT) Medications reviewed Current Facility-Administered Medications Medication Dose Route Frequency  enoxaparin (LOVENOX) injection 40 mg  40 mg SubCUTAneous Q24H  carvedilol (COREG) tablet 25 mg  25 mg Oral BID WITH MEALS  predniSONE (DELTASONE) tablet 20 mg  20 mg Oral BID WITH MEALS  clopidogrel (PLAVIX) tablet 75 mg  75 mg Oral DAILY  arformoterol (BROVANA) neb solution 15 mcg  15 mcg Nebulization BID RT  
 budesonide (PULMICORT) 500 mcg/2 ml nebulizer suspension  500 mcg Nebulization BID RT  
 albuterol-ipratropium (DUO-NEB) 2.5 MG-0.5 MG/3 ML  3 mL Nebulization Q6H RT  
 DULoxetine (CYMBALTA) capsule 60 mg  60 mg Oral DAILY  isosorbide mononitrate ER (IMDUR) tablet 30 mg  30 mg Oral DAILY  lisinopril (PRINIVIL, ZESTRIL) tablet 5 mg  5 mg Oral DAILY  montelukast (SINGULAIR) tablet 10 mg  10 mg Oral DAILY  sAXagliptin (ONGLYZA) tablet 5 mg  5 mg Oral DAILY  sodium chloride (NS) flush 5-10 mL  5-10 mL IntraVENous Q8H  
 sodium chloride (NS) flush 5-10 mL  5-10 mL IntraVENous PRN  
 acetaminophen (TYLENOL) tablet 650 mg  650 mg Oral Q4H PRN  
 oxyCODONE-acetaminophen (PERCOCET) 5-325 mg per tablet 1 Tab  1 Tab Oral Q4H PRN  
 HYDROmorphone (PF) (DILAUDID) injection 0.5 mg  0.5 mg IntraVENous Q4H PRN  prochlorperazine (COMPAZINE) injection 10 mg  10 mg IntraVENous Q6H PRN  
 zolpidem (AMBIEN) tablet 5 mg  5 mg Oral QHS PRN  
 insulin lispro (HUMALOG) injection   SubCUTAneous QID WITH MEALS  glucose chewable tablet 16 g  4 Tab Oral PRN  
 dextrose (D50W) injection syrg 12.5-25 g  25-50 mL IntraVENous PRN  
 glucagon (GLUCAGEN) injection 1 mg  1 mg IntraMUSCular PRN  pantoprazole (PROTONIX) tablet 40 mg  40 mg Oral ACB&D Care Plan discussed with: Patient and Nurse Total time spent with patient: 30 minutes.  
 
Obi Parker MD

## 2018-11-20 NOTE — PROGRESS NOTES
Per chart review, Pt was unable to have kyphoplasty on 11.19. Unable to ADLs at this time. PT/OT is following. Will continue to follow for discharge planning. 10:00am CM spoke with Pt regarding discharge plan for Rehab. Pt is a current resident at [address], Pt preferred receiving Rehabilitation at [facility]. CM submitted referral via [employer]. 2:30pm Janey Ortega is able to accept. Shorty 47, BS, [facility]

## 2018-11-20 NOTE — PROGRESS NOTES
Ortho progress notes: 
 
S: Pt resting in bed. Pt states she attempted to sit in chair with TLSO donned. Pt states she was only able to tolerate 15 minutes up in the chair with the brace on. She started to feel like her breathing was restricted and it was too tight on her chest wall and abdomen. Her pain in her mid thoracic spine is unbearable when OOB. When lying flat her pain is tolerable. Pt is extremely frustrated with limitations to her treatment plan due to comorbid conditions and issue with anticoagulation. O: VSS Breathing even/ nonlabored. Bilaterally upper extremities with 5/5 strength throughout. Full sensation throughout. Pain to palpation at mid thoracic spine. Tenderness throughout paraspinal musculature. A/P:   T7 wedge compression fx Pt is unable to tolerate TLSO due to respiratory status (COPD) and body habitus. Failing conservative therapy. CAD with hx stent placement in June '18; recommended to continue plavix for 6 months-1 year, however due to patient failing conservative therapy and need for kyphoplasty, Dr. Juarez Ornelas agreed is was okay to stop plavix x 5 days and resume after procedure. Discussed this with the patient. Pt wishes to go home for the next 5 days and return as outpatient for the kyphoplasty. PT/ OT to determine safety profile for pt to dc home with daughter support. IF not, possible rehab/ return for OP kyphoplasty on Monday. This is to be determined If okay with medicine, hold Plavix x 5 days for kyphoplasty to be preformed on Monday.  
 
Burgess Lea NP

## 2018-11-20 NOTE — PROGRESS NOTES
Problem: Mobility Impaired (Adult and Pediatric) Goal: *Acute Goals and Plan of Care (Insert Text) Physical Therapy Goals Initiated 11/20/2018 1. Patient will move from supine to sit and sit to supine , scoot up and down and roll side to side in bed with independence within 4 days. 2. Patient will perform sit to stand with modified independence within 4 days. 3. Patient will ambulate with modified independence for 200 feet with the least restrictive device within 4 days. 4. Patient will verbalize and demonstrate understanding of spinal precautions (No bending, lifting greater than 5 lbs, or twisting; log-roll technique; frequent repositioning as instructed) within 4 days. physical Therapy EVALUATION Patient: Pooja Collado (75 y.o. female) Date: 11/20/2018 Primary Diagnosis: Dyspnea Dyspnea Precautions: Log roll, Back brace when OOB, may be up to bathroom without the back brace. ASSESSMENT : 
Based on the objective data described below, the patient presents with difficulty with functional mobility and ambulation. Patient kyphoplasty been cancelled and rescheduled multiple times per last ortho notes patient can be fitted with TLSO if kyphoplasty cancelled again. Notified nurse and cleared for therapy. Educate and reviewed back precautions, donning of TLSO with the patient verbalized understanding. re enforced log rolling when getting out of bed. Rolled on the edge of bed with min assist x 2, supine to sit min assist x 2, sit to stand min assist x 2, sit  To stand 4 times with min assist. Sitting and standing balance fair. Ambulate with rolling walker towards the chair min assist x 2. Performed some active range of motion exercise on both LE all planes while up on the high back chair. Notified nurse who agreed to monitor and assist back to bed when ready to go back. Patient will benefit from skilled intervention to address the above impairments. Patients rehabilitation potential is considered to be Excellent Factors which may influence rehabilitation potential include:  
[]         None noted 
[]         Mental ability/status [x]         Medical condition 
[]         Home/family situation and support systems 
[x]         Safety awareness [x]         Pain tolerance/management 
[]         Other: PLAN : 
Recommendations and Planned Interventions: 
[x]           Bed Mobility Training             []    Neuromuscular Re-Education 
[x]           Transfer Training                   []    Orthotic/Prosthetic Training 
[x]           Gait Training                         []    Modalities [x]           Therapeutic Exercises           []    Edema Management/Control 
[x]           Therapeutic Activities            []    Patient and Family Training/Education 
[]           Other (comment): Frequency/Duration: Patient will be followed by physical therapy  daily to address goals. Discharge Recommendations: please refer to MD's recommendation for disposition Further Equipment Recommendations for Discharge: TBD SUBJECTIVE:  
Patient stated I have been on bed for a week now.  OBJECTIVE DATA SUMMARY:  
HISTORY:   
Past Medical History:  
Diagnosis Date  Asthma  [facility] 11/16/2018  Autoimmune disease ([facility]   
 fibromyalgia  CAD (coronary artery disease) 10/9/2015  Closed wedge compression fracture of T7 vertebra ([facility] 11/13/2018  Diabetes ([facility]  DVT (deep vein thrombosis) in pregnancy ([facility]  GERD (gastroesophageal reflux disease)  HTN (hypertension)  NSTEMI (non-ST elevated myocardial infarction) ([facility] 10/9/2015  Obesity (BMI 30-39.9) 11/13/2018  Sleep apnea   
 wears CPAP Past Surgical History:  
Procedure Laterality Date  ABDOMEN SURGERY PROC UNLISTED    
 hital hernia repair, gall bladder removed  BREAST SURGERY PROCEDURE UNLISTED    
 lumpectomy  CARDIAC SURG PROCEDURE UNLIST  10/9/15  
 2 stents [facility]  HX COLOSTOMY    
 and reversal, post episiotomy repair [address]  HX UROLOGICAL  2009  
 bladder sling Prior Level of Function/Home Situation: Independent community ambulator without assistive device. Personal factors and/or comorbidities impacting plan of care:  
 
Home Situation Home Environment: Independent living One/Two Story Residence: Two story(pt has chair lift) Living Alone: Yes Support Systems: Family member(s) Patient Expects to be Discharged to[de-identified] Private residence Current DME Used/Available at Home: Lift chair, Smita Oliva, quad EXAMINATION/PRESENTATION/DECISION MAKING: Critical Behavior: 
  
  
  
  
Hearing: Auditory Auditory Impairment: Hearing aid(s), Hard of hearing, bilateral 
Hearing Aids/Status: Bilateral, With patient Range Of Motion: 
AROM: Within functional limits PROM: Within functional limits Strength:   
Strength: Generally decreased, functional 
  
  
  
  
  
  
Tone & Sensation:  
  
  
  
  
  
  
  
  
  
   
Coordination: 
Coordination: Within functional limits Vision:  
  
Functional Mobility: 
Bed Mobility: 
Rolling: Minimum assistance; Additional time;Assist x2 Supine to Sit: Minimum assistance; Additional time;Assist x2 Sit to Supine: Minimum assistance; Additional time;Assist x2 Scooting: Minimum assistance; Additional time;Assist x2 Transfers: 
Sit to Stand: Minimum assistance; Additional time;Assist x2 Stand to Sit: Minimum assistance; Additional time;Assist x2 Stand Pivot Transfers: Assist x2 Bed to Chair: Minimum assistance; Additional time;Assist x2 Balance:  
Sitting: Intact Standing: Impaired; With support;Pull to stand Standing - Static: Fair Standing - Dynamic : FairAmbulation/Gait Training:Distance (ft): 5 Feet (ft) Assistive Device: Walker, rolling;Gait belt;Brace/Splint Ambulation - Level of Assistance: Minimal assistance; Additional time;Assist x2 Gait Description (WDL): Exceptions to [facility] Base of Support: Widened Speed/Es: Slow Therapeutic Exercises:  
 Instructed patient to continue active range of motion exercise on both legs while up on chair or on bed. Functional Measure: 
Barthel Index: 
 
Bathin Bladder: 10 Bowels: 10 
Groomin Dressin Feeding: 10 Mobility: 0 Stairs: 0 Toilet Use: 5 Transfer (Bed to Chair and Back): 10 Total: 50 Barthel and G-code impairment scale: 
Percentage of impairment CH 
0% CI 
1-19% CJ 
20-39% CK 
40-59% CL 
60-79% CM 
80-99% CN 
100% Barthel Score 0-100 100 99-80 79-60 59-40 20-39 1-19 
 0 Barthel Score 0-20 20 17-19 13-16 9-12 5-8 1-4 0 The Barthel ADL Index: Guidelines 1. The index should be used as a record of what a patient does, not as a record of what a patient could do. 2. The main aim is to establish degree of independence from any help, physical or verbal, however minor and for whatever reason. 3. The need for supervision renders the patient not independent. 4. A patient's performance should be established using the best available evidence. Asking the patient, friends/relatives and nurses are the usual sources, but direct observation and common sense are also important. However direct testing is not needed. 5. Usually the patient's performance over the preceding 24-48 hours is important, but occasionally longer periods will be relevant. 6. Middle categories imply that the patient supplies over 50 per cent of the effort. 7. Use of aids to be independent is allowed. Tali Patel., Barthel, D.W. ([id number]). Functional evaluation: the Barthel Index. [facility] (14)2. DAMI Cervantes, Driss Griffith., Korin Botello, [address]  Measuring the change indisability after inpatient rehabilitation; comparison of the responsiveness of the Barthel Index and Functional [place] Measure. Journal of Neurology, Neurosurgery, and Psychiatry, 66(4), 422-606. PARRISH Macario, SANIYA Parry, & Emrey Lynne M.A. (2004.) Assessment of post-stroke quality of life in cost-effectiveness studies: The usefulness of the Barthel Index and the EuroQoL-5D. [facility], 13, 526-91 G codes: In compliance with CMSs Claims Based Outcome Reporting, the following G-code set was chosen for this patient based on their primary functional limitation being treated: The outcome measure chosen to determine the severity of the functional limitation was the barthel with a score of 50/100 which was correlated with the impairment scale. ? Mobility - Walking and Moving Around:  
  - CURRENT STATUS: CK - 40%-59% impaired, limited or restricted  - GOAL STATUS: CJ - 20%-39% impaired, limited or restricted  - D/C STATUS:  ---------------To be determined--------------- Physical Therapy Evaluation Charge Determination History Examination Presentation Decision-Making LOW Complexity : Zero comorbidities / personal factors that will impact the outcome / POC MEDIUM Complexity : 3 Standardized tests and measures addressing body structure, function, activity limitation and / or participation in recreation  MEDIUM Complexity : Evolving with changing characteristics  Other outcome measures barthel  MEDIUM Based on the above components, the patient evaluation is determined to be of the following complexity level: MEDIUM Pain: 
  
  
  
  
  
  
Activity Tolerance:  
Good. Please refer to the flowsheet for vital signs taken during this treatment. After treatment:  
[x]         Patient left in no apparent distress sitting up in chair 
[]         Patient left in no apparent distress in bed 
[x]         Call bell left within reach [x]         Nursing notified []         Caregiver present 
[]         Bed alarm activated COMMUNICATION/EDUCATION:  
The patients plan of care was discussed with: Registered Nurse and patient. [x]         Fall prevention education was provided and the patient/caregiver indicated understanding. [x]         Patient/family have participated as able in goal setting and plan of care. [x]         Patient/family agree to work toward stated goals and plan of care. []         Patient understands intent and goals of therapy, but is neutral about his/her participation. []         Patient is unable to participate in goal setting and plan of care. Thank you for this referral. 
Jimenez Peace, PT,C. Time Calculation: 40 mins

## 2018-11-20 NOTE — DIABETES MGMT
DTC Progress Note Recommendations/ Comments: If appropriate, please consider adding Lantus 10 units daily to aid in glucose control while on PO steroids. Pt has required 12 units of correction over the past 24 hours. Current hospital DM medication:  
-Humalog insulin resistant correction  
-Prednisone 20mg bid with meals Chart reviewed on Zaira James. Patient is a 81 y.o. female with known history of Type 2 Diabetes on Onglyza 5mg daily at home. A1c:  
Lab Results Component Value Date/Time Hemoglobin A1c 8.5 (H) 11/13/2018 03:26 PM  
 Hemoglobin A1c 6.4 (H) 06/27/2018 10:48 AM  
 
 
Recent Glucose Results:  
Lab Results Component Value Date/Time  (H) 11/20/2018 05:43 AM  
 GLUCPOC 234 (H) 11/20/2018 11:06 AM  
 GLUCPOC 242 (H) 11/20/2018 07:13 AM  
 GLUCPOC 269 (H) 11/19/2018 09:33 PM  
  
 
Lab Results Component Value Date/Time Creatinine 1.10 (H) 11/20/2018 05:43 AM  
 
Estimated Creatinine Clearance: 57.6 mL/min (A) (based on SCr of 1.1 mg/dL (H)). Active Orders Diet DIET DIABETIC CONSISTENT CARB Regular PO intake:  
Patient Vitals for the past 72 hrs: 
 % Diet Eaten 11/20/18 1005 50 % 11/17/18 1700 100 % Will continue to follow as needed. Thank you Carla Ash RD, CDE [facility] Office: [phone] Pager: [phone]

## 2018-11-20 NOTE — PROGRESS NOTES
Nutrition Assessment: 
 
RECOMMENDATIONS/INTERVENTION(S):  
Continue CCD diet Monitor PO intakes, BG, weight- please reweigh pt No ONS needed at this time- encourage protein snacks- yogurt, cheese stick, peanut butter, etc.  
 
ASSESSMENT:  
11/20: 81 yr old female admitted for SOB. PMHx: DM, HTN, CAD, DVT. Pt screened for LOS. Breathing improved. Pt here now for possible kyphoplasty. Delayed 2/2 medication regimen. Documented as eating well, >75% most of the time. Pt BG elevated- on steroids but tapering now. , 242, 271, 246 mg/dL. A1C 8.5. DTC following. Last BM today. Noted weight down to 219 lbs from 243 lbs 5 days ago. Usual weight is ~250 lbs. Please reweigh pt. Labs reviewed. SUBJECTIVE/OBJECTIVE:  
Diet Order: Consistent carb 
% Eaten:   
Patient Vitals for the past 168 hrs: 
 % Diet Eaten 11/20/18 1005 50 % 11/17/18 1700 100 % 11/17/18 1223 100 % 11/17/18 0822 100 % 11/16/18 2330 100 % 11/16/18 1730 100 % 11/16/18 1213 100 % 11/16/18 1000 100 % 11/15/18 1824 100 % 11/15/18 1340 100 % 11/15/18 0911 90 % 11/14/18 1747 80 % Pertinent Medications: [x] Reviewed Labs reviewed:  [x] Anthropometrics: Height: 5' 7\" (170.2 cm) Weight: 99.4 kg (219 lb 3.2 oz) IBW (%IBW):   ( ) UBW (%UBW):   (  %) BMI: Body mass index is 35.38 kg/m². This BMI is indicative of: 
 
 [] Underweight    [] Normal    [] Overweight    [x]  Obesity    []  Extreme Obesity (BMI>40) Estimated Nutrition Needs (Based on): 9588 Kcals/day , 99 g(-119g/day(1.0-1.2g/kg)) Protein Carbohydrate: At Least 130 g/day  Fluids: 1850 mL/day (1mL/kg rounded to 50 mL) Last BM: 11/20   [x]Active     []Hyperactive  []Hypoactive       [] Absent   BS Skin:    [x] Intact   [] Incision  [] Breakdown   [] DTI   [] Tears/Excoriation/Abrasion  [x]Edema 1+ BLE[] Other: Wt Readings from Last 30 Encounters:  
11/19/18 99.4 kg (219 lb 3.2 oz)  
11/14/18 110.2 kg (243 lb) 10/19/18 118.8 kg (261 lb 14.5 oz) 10/17/18 112.5 kg (248 lb) 10/01/18 117.7 kg (259 lb 6.4 oz) 08/31/18 111.6 kg (246 lb)  
06/30/18 110.5 kg (243 lb 9.6 oz) 03/30/18 113.4 kg (250 lb) 04/16/17 70.3 kg (155 lb) 04/16/17 70.3 kg (155 lb)  
01/16/17 112 kg (247 lb)  
07/15/16 113.4 kg (250 lb) 02/09/16 117.5 kg (259 lb)  
01/29/16 117 kg (258 lb)  
01/26/16 116.1 kg (256 lb)  
01/22/16 116.7 kg (257 lb 3.2 oz) 01/15/16 115.2 kg (254 lb) 01/12/16 115.2 kg (254 lb) 01/05/16 116.1 kg (256 lb) 12/29/15 115.2 kg (254 lb) 12/22/15 117.5 kg (259 lb) 12/18/15 117.5 kg (259 lb) 12/15/15 117.5 kg (259 lb) 12/08/15 117.5 kg (259 lb) 12/04/15 117.9 kg (260 lb) 12/01/15 117.5 kg (259 lb)  
11/24/15 117 kg (258 lb)  
11/20/15 117.5 kg (259 lb)  
11/17/15 119.3 kg (263 lb)  
11/13/15 119.7 kg (264 lb) NUTRITION DIAGNOSES:  
Problem:  Altered nutrition-related lab values Etiology: related to endocrine dysfunction Signs/Symptoms: as evidenced by , 242, 271, 246 mg/dL A1C 8.5- on steroids NUTRITION INTERVENTIONS: 
Meals/Snacks: General/healthful diet   Supplements: Commercial supplement GOAL:  
Pt will consume >50% of meals and ONS w BG <180 mg/dL within 3-5 days Cultural, Yarsanism, or Ethnic Dietary Needs: None LEARNING NEEDS (Diet, Food/Nutrient-Drug Interaction):  
 [x] None Identified 
 [] Identified and Education Provided/Documented 
 [] Identified and Pt declined/was not appropriate [x] Interdisciplinary Care Plan Reviewed/Documented  
 [x] Discharge Needs:    TBD [] No Nutrition Related Discharge Needs NUTRITION RISK:  
Pt Is At Nutrition Risk  [x] No Nutrition Risk Identified  [] PT SEEN FOR:  
 []  MD Consult: []Calorie Count []Diabetic Diet Education []Diet Education []Electrolyte Management []General Nutrition Management and Supplements []Management of Tube Feeding []TPN Recommendations  []  RN Referral:  []MST score >=2 
 []Enteral/Parenteral Nutrition PTA []Pregnant: Gestational DM or Multigestation  
              [] Pressure Ulcer 
   
[]  Low BMI      [x]  Length of Stay       [] Dysphagia Diet     [] Ventilator      [] Follow-Up Previous Recommendations: 
 [] Implemented          [] Not Implemented          [x] Not Applicable Previous Goal: 
 [] Met              [] Progressing Towards Goal              [] Not Progressing Towards Goal   [x] Not Applicable Dorie Jimenez RD Pager: [phone] Office: [phone]

## 2018-11-20 NOTE — ROUTINE PROCESS
Bedside and Verbal shift change report given to Rancho Porras (oncoming nurse) by Semaj Griffin (offgoing nurse). Report included the following information SBAR, Kardex and Quality Measures.

## 2018-11-20 NOTE — PROGRESS NOTES
Name: Ann Banks: [address] [place] [address] Dr  
: 1945 Admit Date: 2018 Phone: [phone]  Room: [facility]/01 PCP: Marshall Callejas MD  MRN: [id number] Date: 2018  Code: Full Code Chart and notes reviewed. Data reviewed. I review the patient's current medications in the medical record at each encounter. I have evaluated and examined the patient. Overnight events Afebrile Sats 98% on 2L Creat 1.10 
 proBNP 239 (was 470 on 11/3) RVP negative V/Q scan low probability MRI with acute T7 vertebral compression deformity with greater than 50% loss of vertebral body height. Mild cortical retropulsion at T7 with mild to moderate canal stenosis at T7. There is marrow edema along the anterior inferior endplate of T6. ROS: Kyphoplasty remains on hold for now due to Plavix. Ortho and IR to discuss and sort out timing. Denies SOB or wheeze. Denies CP or palpitations. Denies fever, chills, or cough. Denies abd pain or LE pain/swelling. Still with back pain, but pain tolerable and better controlled. Was fitted for TLSO today by therapy. Past Medical History:  
Diagnosis Date  Asthma  Atrial fibrillation ([address] 2018  Autoimmune disease ([address]   
 fibromyalgia  CAD (coronary artery disease) 10/9/2015  Closed wedge compression fracture of T7 vertebra ([address] 2018  Diabetes ([address]  DVT (deep vein thrombosis) in pregnancy ([address]  GERD (gastroesophageal reflux disease)  HTN (hypertension)  NSTEMI (non-ST elevated myocardial infarction) ([address] 10/9/2015  Obesity (BMI 30-39.9) 2018  Sleep apnea   
 wears CPAP Past Surgical History:  
Procedure Laterality Date  ABDOMEN SURGERY PROC UNLISTED    
 hital hernia repair, gall bladder removed  BREAST SURGERY PROCEDURE UNLISTED    
 lumpectomy  CARDIAC SURG PROCEDURE UNLIST  10/9/15  
 2 stents [place]  HX COLOSTOMY and reversal, post episiotomy repair [email or web address]   UROLOGICAL  2009  
 bladder sling Family History Problem Relation Age of Onset  Diabetes Mother  Heart Failure Mother  Kidney Disease Mother  Diabetes Father  Heart Disease Father  Elevated Lipids Father  Heart Failure Father  Heart Disease Brother  Cancer Brother   
     kidney  Diabetes Brother  No Known Problems Brother  No Known Problems Brother Social History Tobacco Use  Smoking status: Former Smoker Last attempt to quit: 3/30/2017 Years since quittin.6  Smokeless tobacco: Never Used Substance Use Topics  Alcohol use: No  
  Alcohol/week: 0.0 oz  
  Comment: very very rarely Allergies Allergen Reactions  Advair Diskus [Fluticasone-Salmeterol] Rash  Aspartame Other (comments)  Ciprofloxacin Rash  Statins-Hmg-Coa Reductase Inhibitors Other (comments) Muscle pain Lipitor/crestor/zocor  Sulfa (Sulfonamide Antibiotics) Rash  Tetracycline Other (comments) musclepain  Zetia [Ezetimibe] Myalgia Current Facility-Administered Medications Medication Dose Route Frequency  calcitonin (salmon) (MIACALCIN) nasal 1 Spray  1 [place] IntraNASal DAILY  enoxaparin (LOVENOX) injection 40 mg  40 mg SubCUTAneous Q24H  carvedilol (COREG) tablet 25 mg  25 mg Oral BID WITH MEALS  predniSONE (DELTASONE) tablet 20 mg  20 mg Oral BID WITH MEALS  clopidogrel (PLAVIX) tablet 75 mg  75 mg Oral DAILY  arformoterol (BROVANA) neb solution 15 mcg  15 mcg Nebulization BID RT  
 budesonide (PULMICORT) 500 mcg/2 ml nebulizer suspension  500 mcg Nebulization BID RT  
 albuterol-ipratropium (DUO-NEB) 2.5 MG-0.5 MG/3 ML  3 mL Nebulization Q6H RT  
 DULoxetine (CYMBALTA) capsule 60 mg  60 mg Oral DAILY  isosorbide mononitrate ER (IMDUR) tablet 30 mg  30 mg Oral DAILY  lisinopril (PRINIVIL, ZESTRIL) tablet 5 mg  5 mg Oral DAILY  montelukast (SINGULAIR) tablet 10 mg  10 mg Oral DAILY  sAXagliptin (ONGLYZA) tablet 5 mg  5 mg Oral DAILY  sodium chloride (NS) flush 5-10 mL  5-10 mL IntraVENous Q8H  
 sodium chloride (NS) flush 5-10 mL  5-10 mL IntraVENous PRN  
 acetaminophen (TYLENOL) tablet 650 mg  650 mg Oral Q4H PRN  
 oxyCODONE-acetaminophen (PERCOCET) 5-325 mg per tablet 1 Tab  1 Tab Oral Q4H PRN  
 HYDROmorphone (PF) (DILAUDID) injection 0.5 mg  0.5 mg IntraVENous Q4H PRN  prochlorperazine (COMPAZINE) injection 10 mg  10 mg IntraVENous Q6H PRN  
 zolpidem (AMBIEN) tablet 5 mg  5 mg Oral QHS PRN  
 insulin lispro (HUMALOG) injection   SubCUTAneous QID WITH MEALS  glucose chewable tablet 16 g  4 Tab Oral PRN  
 dextrose (D50W) injection syrg 12.5-25 g  25-50 mL IntraVENous PRN  
 glucagon (GLUCAGEN) injection 1 mg  1 mg IntraMUSCular PRN  pantoprazole (PROTONIX) tablet 40 mg  40 mg Oral ACB&D REVIEW OF SYSTEMS  
12 point ROS negative except as stated in the HPI. Physical Exam:  
Visit Vitals /82 (BP 1 Location: Right arm, BP Patient Position: At rest) Pulse 94 Temp 97.9 °F (36.6 °C) Resp 16 Ht 5' 7\" (1.702 m) Wt 99.4 kg (219 lb 3.2 oz) SpO2 96% Breastfeeding? No  
BMI 35.38 kg/m² General:  Alert, cooperative, no acute distress, appears stated age. Head:  Normocephalic, without obvious abnormality, atraumatic. Eyes:  Conjunctivae/corneas clear. Nose: Nares normal. Septum midline. Mucosa normal.   
Throat: Lips, mucosa, and tongue normal.   
Neck: Supple, symmetrical, trachea midline, no adenopathy. Lungs:   Mildly diminished but clear. No wheeze or rales. Resp nonlabored. Chest wall:  No tenderness or deformity. Heart:  Regular rate and rhythm, S1, S2 normal, no murmur, click, rub or gallop. Abdomen:   Obese, soft, non-tender. Bowel sounds normal. No masses,  No organomegaly. Extremities: Extremities normal, atraumatic, no cyanosis or edema. Pulses: 2+ and symmetric all extremities. Skin: Skin color, texture, turgor normal. No rashes or lesions Lymph nodes: Cervical, supraclavicular nodes normal.  
Neurologic: Grossly nonfocal  
 
 
Lab Results Component Value Date/Time Sodium 138 11/20/2018 05:43 AM  
 Potassium 4.4 11/20/2018 05:43 AM  
 Chloride 102 11/20/2018 05:43 AM  
 CO2 28 11/20/2018 05:43 AM  
 BUN 27 (H) 11/20/2018 05:43 AM  
 Creatinine 1.10 (H) 11/20/2018 05:43 AM  
 Glucose 242 (H) 11/20/2018 05:43 AM  
 Calcium 8.7 11/20/2018 05:43 AM  
 Magnesium 2.0 11/20/2018 05:43 AM  
 Lactic acid 1.4 06/28/2018 04:22 AM  
 
 
Lab Results Component Value Date/Time WBC 11.1 (H) 11/20/2018 05:43 AM  
 HGB 13.6 11/20/2018 05:43 AM  
 PLATELET 036 32/77/8313 05:43 AM  
 MCV 92.5 11/20/2018 05:43 AM  
 
 
Lab Results Component Value Date/Time INR 1.1 10/17/2018 12:53 PM  
 AST (SGOT) 19 11/20/2018 05:43 AM  
 Alk. phosphatase 122 (H) 11/20/2018 05:43 AM  
 Protein, total 5.9 (L) 11/20/2018 05:43 AM  
 Albumin 2.6 (L) 11/20/2018 05:43 AM  
 Globulin 3.3 11/20/2018 05:43 AM  
 
 
No results found for: IRON, FE, TIBC, IBCT, PSAT, FERR Lab Results Component Value Date/Time TSH 0.58 11/13/2018 03:26 PM  
  
 
No results found for: PH, PHI, PCO2, PCO2I, PO2, PO2I, HCO3, HCO3I, FIO2, FIO2I Lab Results Component Value Date/Time CK 25 (L) 11/13/2018 03:26 PM  
 CK-MB Index 4.4 (H) 11/13/2018 03:26 PM  
 Troponin-I, Qt. <0.05 11/13/2018 03:26 PM  
  
 
Lab Results Component Value Date/Time Culture result: ESCHERICHIA COLI (PREDOMINATING) (A) 06/28/2018 07:01 AM  
 Culture result: ENTEROBACTER AEROGENES (A) 06/28/2018 07:01 AM  
 Culture result: (A) 06/28/2018 04:13 AM  
  ENTEROBACTER AEROGENES GROWING IN 1 OF 4 BOTTLES DRAWN (SITE=L UPPER ARM) Culture result: (A) 06/28/2018 04:13 AM  
  PRELIMINARY REPORT OF 
GRAM NEGATIVE COCCOBACILLI GROWING IN 1 OF 4 BOTTLES DRAWN 
CALLED TO AND READ BACK BY 
CHANG HUNTLEY RN [facility] AT [id number] ON 6/28/2018. [address] [id number] Culture result: REMAINING BOTTLE(S) HAS/HAVE NO GROWTH IN 5 DAYS 06/28/2018 04:13 AM  
 
 
No results found for: TOXA1, RPR, HBCM, HBSAG, HAAB, HCAB1, HAAT, G6PD, CRYAC, HIVGT, HIVR, HIV1, HIV12, HIVPC, HIVRPI Lab Results Component Value Date/Time CK 25 (L) 11/13/2018 03:26 PM  
 
 
Lab Results Component Value Date/Time Color YELLOW/STRAW 11/13/2018 07:58 PM  
 Appearance CLOUDY (A) 11/13/2018 07:58 PM  
 Specific gravity 1.020 04/16/2017 01:13 PM  
 pH (UA) 5.0 11/13/2018 07:58 PM  
 Protein TRACE (A) 11/13/2018 07:58 PM  
 Glucose >1,000 (A) 11/13/2018 07:58 PM  
 Ketone NEGATIVE  11/13/2018 07:58 PM  
 Bilirubin NEGATIVE  11/13/2018 07:58 PM  
 Blood TRACE (A) 11/13/2018 07:58 PM  
 Urobilinogen 0.2 11/13/2018 07:58 PM  
 Nitrites NEGATIVE  11/13/2018 07:58 PM  
 Leukocyte Esterase MODERATE (A) 11/13/2018 07:58 PM  
 WBC  11/13/2018 07:58 PM  
 RBC 0-5 11/13/2018 07:58 PM  
 Bacteria 4+ (A) 11/13/2018 07:58 PM  
 
 
Images: no new images this morning IMPRESSION 
· COPD exacerbation · Shortness of breath · Pulmonary hypertension, likely secondary to underlying obstructive lung disease and HAYES, but does have a history of CTD · History of DVT, on Xarelto · NAIDA · Closed wedge compression fracture of T7 vertebra · Afib with RVR: currently SR 
 
PLAN 
· Continue Coreg for rate control per cardioloy · Wean O2 for goal sats >90% · Duonebs; brovana/pulmicort · Singulair · Prednisone taper · No indication for antibiotics at this time · Gentle IVFs; trend creat · Cardiac diet · Ortho following: kyphoplasty timing per Ortho/IR. Needs to remain on Plavix at this time per Cardiology. · Will need to consider treatment of PH if symptoms persist 
· CPAP nightly and with naps for known HAYES · PPI 
· On xarelto chronically, plavix for stents.   Cardiology ok with holding anticoagulation for procedure, but not Plavix. Patient is stable from a pulmonary standpoint. We will sign off and be available as needed. Please call with questions.  
 
 
Raimundo Sorto

## 2018-11-21 LAB
ALBUMIN SERPL-MCNC: 2.5 G/DL (ref 3.5–5)
ALBUMIN/GLOB SERPL: 0.7 {RATIO} (ref 1.1–2.2)
ALP SERPL-CCNC: 117 U/L (ref 45–117)
ALT SERPL-CCNC: 49 U/L (ref 12–78)
ANION GAP SERPL CALC-SCNC: 10 MMOL/L (ref 5–15)
AST SERPL-CCNC: 15 U/L (ref 15–37)
BASOPHILS # BLD: 0 K/UL (ref 0–0.1)
BASOPHILS NFR BLD: 0 % (ref 0–1)
BILIRUB SERPL-MCNC: 0.4 MG/DL (ref 0.2–1)
BUN SERPL-MCNC: 25 MG/DL (ref 6–20)
BUN/CREAT SERPL: 28 (ref 12–20)
CALCIUM SERPL-MCNC: 8.3 MG/DL (ref 8.5–10.1)
CHLORIDE SERPL-SCNC: 101 MMOL/L (ref 97–108)
CO2 SERPL-SCNC: 24 MMOL/L (ref 21–32)
CREAT SERPL-MCNC: 0.89 MG/DL (ref 0.55–1.02)
DIFFERENTIAL METHOD BLD: ABNORMAL
EOSINOPHIL # BLD: 0 K/UL (ref 0–0.4)
EOSINOPHIL NFR BLD: 0 % (ref 0–7)
ERYTHROCYTE [DISTWIDTH] IN BLOOD BY AUTOMATED COUNT: 14.9 % (ref 11.5–14.5)
GLOBULIN SER CALC-MCNC: 3.4 G/DL (ref 2–4)
GLUCOSE BLD STRIP.AUTO-MCNC: 201 MG/DL (ref 65–100)
GLUCOSE BLD STRIP.AUTO-MCNC: 216 MG/DL (ref 65–100)
GLUCOSE BLD STRIP.AUTO-MCNC: 247 MG/DL (ref 65–100)
GLUCOSE BLD STRIP.AUTO-MCNC: 293 MG/DL (ref 65–100)
GLUCOSE SERPL-MCNC: 264 MG/DL (ref 65–100)
HCT VFR BLD AUTO: 41.3 % (ref 35–47)
HGB BLD-MCNC: 13.2 G/DL (ref 11.5–16)
IMM GRANULOCYTES # BLD: 0.5 K/UL (ref 0–0.04)
IMM GRANULOCYTES NFR BLD AUTO: 4 % (ref 0–0.5)
LYMPHOCYTES # BLD: 0.7 K/UL (ref 0.8–3.5)
LYMPHOCYTES NFR BLD: 6 % (ref 12–49)
MAGNESIUM SERPL-MCNC: 1.9 MG/DL (ref 1.6–2.4)
MCH RBC QN AUTO: 28.7 PG (ref 26–34)
MCHC RBC AUTO-ENTMCNC: 32 G/DL (ref 30–36.5)
MCV RBC AUTO: 89.8 FL (ref 80–99)
MONOCYTES # BLD: 0.7 K/UL (ref 0–1)
MONOCYTES NFR BLD: 6 % (ref 5–13)
NEUTS SEG # BLD: 9.9 K/UL (ref 1.8–8)
NEUTS SEG NFR BLD: 84 % (ref 32–75)
NRBC # BLD: 0 K/UL (ref 0–0.01)
NRBC BLD-RTO: 0 PER 100 WBC
PLATELET # BLD AUTO: 202 K/UL (ref 150–400)
PMV BLD AUTO: 10.1 FL (ref 8.9–12.9)
POTASSIUM SERPL-SCNC: 3.7 MMOL/L (ref 3.5–5.1)
PROT SERPL-MCNC: 5.9 G/DL (ref 6.4–8.2)
RBC # BLD AUTO: 4.6 M/UL (ref 3.8–5.2)
RBC MORPH BLD: ABNORMAL
SERVICE CMNT-IMP: ABNORMAL
SODIUM SERPL-SCNC: 135 MMOL/L (ref 136–145)
WBC # BLD AUTO: 11.8 K/UL (ref 3.6–11)

## 2018-11-21 PROCEDURE — 77010033678 HC OXYGEN DAILY

## 2018-11-21 PROCEDURE — 74011000250 HC RX REV CODE- 250: Performed by: INTERNAL MEDICINE

## 2018-11-21 PROCEDURE — 36415 COLL VENOUS BLD VENIPUNCTURE: CPT

## 2018-11-21 PROCEDURE — 80053 COMPREHEN METABOLIC PANEL: CPT

## 2018-11-21 PROCEDURE — 65270000029 HC RM PRIVATE

## 2018-11-21 PROCEDURE — 74011250636 HC RX REV CODE- 250/636: Performed by: SPECIALIST

## 2018-11-21 PROCEDURE — 97530 THERAPEUTIC ACTIVITIES: CPT

## 2018-11-21 PROCEDURE — 74011250637 HC RX REV CODE- 250/637: Performed by: INTERNAL MEDICINE

## 2018-11-21 PROCEDURE — 83735 ASSAY OF MAGNESIUM: CPT

## 2018-11-21 PROCEDURE — 85025 COMPLETE CBC W/AUTO DIFF WBC: CPT

## 2018-11-21 PROCEDURE — 94640 AIRWAY INHALATION TREATMENT: CPT

## 2018-11-21 PROCEDURE — 82962 GLUCOSE BLOOD TEST: CPT

## 2018-11-21 PROCEDURE — 74011636637 HC RX REV CODE- 636/637: Performed by: FAMILY MEDICINE

## 2018-11-21 PROCEDURE — 94760 N-INVAS EAR/PLS OXIMETRY 1: CPT

## 2018-11-21 RX ORDER — CLOPIDOGREL BISULFATE 75 MG/1
75 TABLET ORAL DAILY
Status: DISCONTINUED | OUTPATIENT
Start: 2018-11-27 | End: 2018-11-22

## 2018-11-21 RX ORDER — PREDNISONE 5 MG/1
10 TABLET ORAL
Status: COMPLETED | OUTPATIENT
Start: 2018-11-21 | End: 2018-11-22

## 2018-11-21 RX ADMIN — BUDESONIDE 500 MCG: 0.5 INHALANT RESPIRATORY (INHALATION) at 07:59

## 2018-11-21 RX ADMIN — IPRATROPIUM BROMIDE AND ALBUTEROL SULFATE 3 ML: .5; 3 SOLUTION RESPIRATORY (INHALATION) at 07:59

## 2018-11-21 RX ADMIN — DULOXETINE 60 MG: 30 CAPSULE, DELAYED RELEASE ORAL at 09:00

## 2018-11-21 RX ADMIN — BUDESONIDE 500 MCG: 0.5 INHALANT RESPIRATORY (INHALATION) at 19:14

## 2018-11-21 RX ADMIN — CARVEDILOL 25 MG: 12.5 TABLET, FILM COATED ORAL at 09:01

## 2018-11-21 RX ADMIN — INSULIN LISPRO 4 UNITS: 100 INJECTION, SOLUTION INTRAVENOUS; SUBCUTANEOUS at 22:00

## 2018-11-21 RX ADMIN — Medication 10 ML: at 06:25

## 2018-11-21 RX ADMIN — SAXAGLIPTIN 5 MG: 5 TABLET, FILM COATED ORAL at 09:21

## 2018-11-21 RX ADMIN — PREDNISONE 10 MG: 5 TABLET ORAL at 17:12

## 2018-11-21 RX ADMIN — MONTELUKAST SODIUM 10 MG: 10 TABLET, COATED ORAL at 09:01

## 2018-11-21 RX ADMIN — PANTOPRAZOLE SODIUM 40 MG: 40 TABLET, DELAYED RELEASE ORAL at 06:24

## 2018-11-21 RX ADMIN — Medication 5 ML: at 22:00

## 2018-11-21 RX ADMIN — OXYCODONE AND ACETAMINOPHEN 1 TABLET: 5; 325 TABLET ORAL at 12:44

## 2018-11-21 RX ADMIN — IPRATROPIUM BROMIDE AND ALBUTEROL SULFATE 3 ML: .5; 3 SOLUTION RESPIRATORY (INHALATION) at 19:14

## 2018-11-21 RX ADMIN — PREDNISONE 10 MG: 5 TABLET ORAL at 09:01

## 2018-11-21 RX ADMIN — ISOSORBIDE MONONITRATE 30 MG: 30 TABLET, EXTENDED RELEASE ORAL at 09:01

## 2018-11-21 RX ADMIN — IPRATROPIUM BROMIDE AND ALBUTEROL SULFATE 3 ML: .5; 3 SOLUTION RESPIRATORY (INHALATION) at 13:16

## 2018-11-21 RX ADMIN — PREDNISONE 10 MG: 5 TABLET ORAL at 12:44

## 2018-11-21 RX ADMIN — CALCITONIN SALMON 1 SPRAY: 200 SPRAY, METERED NASAL at 09:00

## 2018-11-21 RX ADMIN — INSULIN LISPRO 4 UNITS: 100 INJECTION, SOLUTION INTRAVENOUS; SUBCUTANEOUS at 12:44

## 2018-11-21 RX ADMIN — INSULIN LISPRO 4 UNITS: 100 INJECTION, SOLUTION INTRAVENOUS; SUBCUTANEOUS at 17:12

## 2018-11-21 RX ADMIN — INSULIN LISPRO 4 UNITS: 100 INJECTION, SOLUTION INTRAVENOUS; SUBCUTANEOUS at 08:59

## 2018-11-21 RX ADMIN — ENOXAPARIN SODIUM 40 MG: 40 INJECTION SUBCUTANEOUS at 09:00

## 2018-11-21 RX ADMIN — CARVEDILOL 25 MG: 12.5 TABLET, FILM COATED ORAL at 17:13

## 2018-11-21 RX ADMIN — Medication 10 ML: at 14:15

## 2018-11-21 RX ADMIN — LISINOPRIL 5 MG: 5 TABLET ORAL at 09:00

## 2018-11-21 RX ADMIN — PANTOPRAZOLE SODIUM 40 MG: 40 TABLET, DELAYED RELEASE ORAL at 17:12

## 2018-11-21 NOTE — PROGRESS NOTES
Problem: Patient Education: Go to Patient Education Activity Goal: Patient/Family Education 
physical Therapy TREATMENT Patient: Dolly Fleming (81 y.o. female) Date: 11/21/2018 Diagnosis: Dyspnea Dyspnea Dyspnea Precautions:   
Chart, physical therapy assessment, plan of care and goals were reviewed. ASSESSMENT: 
Pt comes to sit with mod to max assist.Pt had dizziness sitting  that resolved quickly. Pt is unable to tolerate back brace that compounds her breathing issues. Pt sit to stand with min assist of 2. Pt took 4 steps to bedside chair with RW min assist of 2 on O2. Pt not safe to live alone. Pt would benefit from rehab before going home. Continue goals. Progression toward goals: 
[]    Improving appropriately and progressing toward goals 
[]    Improving slowly and progressing toward goals 
[]    Not making progress toward goals and plan of care will be adjusted PLAN: 
Patient continues to benefit from skilled intervention to address the above impairments. Continue treatment per established plan of care. Discharge Recommendations:  SNF vs rehab Further Equipment Recommendations for Discharge:  rolling walker SUBJECTIVE:  
 
 
OBJECTIVE DATA SUMMARY:  
Critical Behavior: 
  
  
  
  
Functional Mobility Training: 
Bed Mobility: 
  
Supine to Sit: Moderate assistance;Maximum assistance Transfers: 
Sit to Stand: Moderate assistance Stand to Sit: Contact guard assistance;Minimum assistance Balance: 
Sitting: Intact Standing: Without supportAmbulation/Gait Training: 
Distance (ft): 2 Feet (ft) Assistive Device: Walker, rolling;Gait belt Ambulation - Level of Assistance: Minimal assistance;Assist x2 Base of Support: Narrowed Speed/Es: Pace decreased (<100 feet/min) Pain: 
Pain Scale 1: Numeric (0 - 10) Pain Intensity 1: 0 Activity Tolerance:  
Pt tolerated treatment fairly well. Please refer to the flowsheet for vital signs taken during this treatment. After treatment:  
[x]    Patient left in no apparent distress sitting up in chair 
[]    Patient left in no apparent distress in bed 
[]    Call bell left within reach 
[]    Nursing notified 
[]    Caregiver present 
[]    Bed alarm activated COMMUNICATION/COLLABORATION:  
The patients plan of care was discussed with: Physical Therapist 
 
Tamar Hodgson PTA Time Calculation: 23 mins

## 2018-11-21 NOTE — PROGRESS NOTES
Spoke with IR at [facility] and they said they can do the kyphoplasty on Monday. [phone] is their contact. Please have primary team call and coordinate this. I have already spoke to IR and they are willing to have her done Monday. They said they can transfer her Monday AM to have this done. SALIMA Burnham-C Orthopaedic Surgery  [facility]

## 2018-11-21 NOTE — WOUND CARE
Wound care consult: 
Initial visit for skin assessment, alert- no distress Assessment All skin folds and bony prominences assessed, turned with staff assistance. Sacral area intact, inner buttocks and lindsay rectal area red and intact, mild excoriation from moisture and incontinence- pure wick in place. Right heel - area of blanching erythema, off loaded on pillow Left heel intact - no redness Treatment Repositioned in bed Heels floated Recommendations/Plan Low air loss surface rental  
Turn, reposition every 2 hours as tolerated, float heels, place in prevalon boots. Incontinent care with comfort shields. Apply Zinc to all open areas, moisture barrier as needed. Will follow, reconsult as needed.  
Donnamae Halsted RN Lesly Dudley

## 2018-11-21 NOTE — PROGRESS NOTES
Received a call from Angio regarding kyphoplasty on Monday. Suzi Bautista from [place] [address] stated they cannot do a kyphoplasty on Monday, possibly not Tuesday either. Spoke with Dr. Gregg Denis regarding procedure. Ortho will schedule kyphoplasty. Attempted to call Suzi Bautista from [facility] to inform, not available.

## 2018-11-21 NOTE — PROGRESS NOTES
Daily Progress Note: 11/21/2018 [place] Loris Ripper, MD 
 
Assessment/Plan: Dyspnea (11/13/2018) improved - workup with Pul and Card. - Pulmonary following and now on po steroids, V/Q scan with low prob for PE 
            - consulted Orthopedics - Kyphoplasty planned for Monday as an outpatient 
  
  Type II diabetes mellitus ([facility] (10/9/2015) - with reported nephropathy with proteinuria without CKD 
            - continue saxagliptin and follow on SSI 
  
  Coronary artery disease involving native coronary artery without angina pectoris (1/22/2016) - stable, continue cardiac meds 
            - remain on Plavix- may hold for 5 days for kyphoplasty 
  
  Essential hypertension (1/22/2016) - cont home meds - adjust as needed 
  
  Chronic anticoagulation (3/30/2018) - holding Xarelto for possible kyphoplasty- on Lovenox 
  
  Obesity, BMI 30-39. 9([facility]) (10/1/2018) - counseled on weight loss, this is contributing to her current symptoms 
            - looks very Pickwickian  
  
  ARF (acute renal failure) ([facility] (11/13/2018) - creatinine up acutely from prior, possibly due to diuresis 
            - hold diuretic  
  
  Closed wedge compression fracture of T7 vertebra ([facility] (11/13/2018) - consulted Orthopedics, hopefully she will be a candidate for kyphoplasty 
            - cardiology ok with holding Lovenox/Xarelto for procedure as well as Plavix Code status: 
            - patient is FULL CODE, no AMD on file, her daughter ΣΑΡΑΝΤΙ is her surrogate 
   
 
Problem List: 
Problem List as of 11/21/2018 Date Reviewed: 11/13/2018 Codes Class Noted - Resolved * (Principal) Dyspnea ICD-10-CM: R06.00 
ICD-9-CM: 786.09  11/13/2018 - Present ARF (acute renal failure) (HCC) ICD-10-CM: N17.9 ICD-9-CM: 584.9  11/13/2018 - Present Obesity (BMI 30-39.9) (Chronic) ICD-10-CM: I44.2 ICD-9-CM: 278.00  11/13/2018 - Present Closed wedge compression fracture of T7 vertebra ([facility] ICD-10-CM: E69.815Y ICD-9-CM: 805.2  11/13/2018 - Present Type 2 diabetes with nephropathy ([facility] ICD-10-CM: E11.21 
ICD-9-CM: 250.40, 583.81  10/1/2018 - Present Chest pain ICD-10-CM: R07.9 ICD-9-CM: 786.50  6/27/2018 - Present Generalized abdominal pain ICD-10-CM: R10.84 ICD-9-CM: 789.07  6/27/2018 - Present Recurrent deep vein thrombosis (DVT) (HCC) ICD-10-CM: I82.409 ICD-9-CM: 453.40  3/30/2018 - Present Chronic anticoagulation (Chronic) ICD-10-CM: Z79.01 
ICD-9-CM: V58.61  3/30/2018 - Present Coronary artery disease involving native coronary artery without angina pectoris (Chronic) ICD-10-CM: I25.10 ICD-9-CM: 414.01  1/22/2016 - Present Essential hypertension (Chronic) ICD-10-CM: I10 
ICD-9-CM: 401.9  1/22/2016 - Present Type II diabetes mellitus (HCC) (Chronic) ICD-10-CM: E11.9 ICD-9-CM: 250.00  10/9/2015 - Present NSTEMI (non-ST elevated myocardial infarction) ([facility] ICD-10-CM: I21.4 ICD-9-CM: 410.70  10/9/2015 - Present RESOLVED: CAD (coronary artery disease) ICD-10-CM: I25.10 ICD-9-CM: 414.00  10/9/2015 - 1/22/2016 RESOLVED: HTN (hypertension) ICD-10-CM: I10 
ICD-9-CM: 401.9  10/9/2015 - 1/22/2016 Subjective:  
  81 y.o.  female who is admitted with SOB. Ms. Alessandra Doe presented to the Emergency Department this PM complaining of SOB: for the past 3 months, worked up by Pulmonary and Cardiology as an outpatient without any definite diagnosis, not hypoxic, associated with back pain, unable to take a deep breath without pain, back pain started at the same time as the SOB. History obtained from chart review and the patient. Previous records reviewed, recent admit for chest pain. (Dr Yecenia Love). :  Feels much better this AM with less back pain on meds. Still very tender over site with any palpation. Less air hunger but still \"not breathing back to normal yet. \"  Glucoses up with the steroids. Pul and Card seeing also. 11/15:    Reports she continues to feel better and reports no longer SOB. She is not moving much due to back pain. No chest pains. Ortho to decide next step for compression fx. No complaints of weakness or numbness LEs. 
 
:  No longer short of breath - will wean steroids to po. IR plans kyphoplasty today. May benefit from Prolia for osteoporosis outpt  
 
18: Transferred to ICU yesterday for A-fib w/ RVR. Converted after Dilt bolus. Now sinus tach in the 100s. Coreg increased this am. She is not requiring a lot of pain meds but also not moving a lot. Xarelto being held and she is on Lovenox now. 18: Transferred out of ICU yesterday. Having pain with any movement. Dr Sadiq Haque would like her to stay on Plavix but is ok holding Xarelto/Lovenox for kyphoplasty. Will need to see if IR can do kyphoplasty on Plavix. Ortho getting her fitted for brace. Breathing well. No SOB. :  Little pain unless she moves about. Kyphoplasty planned for today. 18: Kyphoplasty now planned for Monday after holding Plavix. She would like to go home and return on Monday as an outpatient for the procedure. Was able to sit in chair yesterday.  
  
Review of Systems: A comprehensive review of systems was negative except for that written in the HPI. Objective:  
Physical Exam:  
 
Visit Vitals /71 (BP 1 Location: Right arm, BP Patient Position: At rest) Pulse 99 Temp 97.5 °F (36.4 °C) Resp 17 Ht 5' 7\" (1.702 m) Wt 219 lb 3.2 oz (99.4 kg) SpO2 97% Breastfeeding? No  
BMI 35.38 kg/m² O2 Flow Rate (L/min): 2 l/min O2 Device: Nasal cannula Temp (24hrs), Av.8 °F (36.6 °C), Min:97.4 °F (36.3 °C), Max:98.1 °F (36.7 °C) No intake/output data recorded. 11/19 1901 - 11/21 0700 In: 100 [P.O.:100] Out: 800 [Urine:800] General:  Alert, cooperative, obese, no distress, appears stated age. Head:  Normocephalic, without obvious abnormality, atraumatic. Eyes:  Conjunctivae/corneas clear. PERRL, EOMs intact. Throat: Lips, mucosa, and tongue moist.. Neck: Supple, symmetrical, trachea midline, no adenopathy, thyroid: no enlargement/tenderness/nodules, no carotid bruit and no JVD. Back:   Symmetric,with marked tenderness to palpation over T6-7-8 area. Lungs:   Diminished BS bilaterally but otherwise clear at this time. Chest wall:  No tenderness or deformity. Heart:  Irregular rate and rhythm, no murmur, click, rub or gallop. Abdomen:   Soft, non-tender. Bowel sounds normal. No masses,  No organomegaly. Extremities: no cyanosis. Trace LE edema. No calf tenderness or cords. Skin:  turgor normal.   
Neurologic: CNII-XII intact. Alert and oriented X 3. Fine motor of hands and fingers normal.   equal.  No cogwheeling or rigidity. Gait not tested at this time. Sensation grossly normal to touch. Gross motor of extremities normal.    
 
Data Review:  
  CT of Chest 11/13/18 FINDINGS: 
THYROID: No nodule. MEDIASTINUM: No mass or lymphadenopathy. SONJA: No mass or lymphadenopathy. THORACIC AORTA: No aneurysm. Mild vascular calcifications MAIN PULMONARY ARTERY: Normal in caliber. TRACHEA/BRONCHI: Patent. ESOPHAGUS: No wall thickening or dilatation. Small hiatal hernia HEART: Normal in size. PLEURA: No effusion or pneumothorax. LUNGS: No nodule, mass, or airspace disease. Bilateral atelectatic changes. INCIDENTALLY IMAGED UPPER ABDOMEN: No focal abnormality. BONES: Bones are osteopenic. There is collapse of the superior endplate of T7. This has occurred since June 2018 Possible milk of calcium cyst within the right breast 
IMPRESSION: 
1. No acute cardiopulmonary disease 2. Interval collapse superior endplate of T7. The patient has back pain this may 
be acute to subacute and MR imaging would better characterize 
  
Recent Days: 
Recent Labs  
  11/21/18 
0349 11/20/18 
0543 11/19/18 
0055 WBC 11.8* 11.1* 8.3 HGB 13.2 13.6 12.2 HCT 41.3 43.3 37.5  206 175 Recent Labs  
  11/21/18 
0349 11/20/18 
0543 11/19/18 
0055 * 138 138  
K 3.7 4.4 3.9  102 103 CO2 24 28 26 * 242* 271* BUN 25* 27* 30* CREA 0.89 1.10* 1.03* CA 8.3* 8.7 8.2* MG 1.9 2.0 2.0 ALB 2.5* 2.6* 2.5* TBILI 0.4 0.5 0.3 SGOT 15 19 16 ALT 49 50 51 No results for input(s): PH, PCO2, PO2, HCO3, FIO2 in the last 72 hours. 24 Hour Results: 
Recent Results (from the past 24 hour(s)) GLUCOSE, POC Collection Time: 11/20/18 11:06 AM  
Result Value Ref Range Glucose (POC) 234 (H) 65 - 100 mg/dL Performed by Merlene Castaneda (PCT) GLUCOSE, POC Collection Time: 11/20/18  4:39 PM  
Result Value Ref Range Glucose (POC) 310 (H) 65 - 100 mg/dL Performed by Merlene Castaneda (PCT) GLUCOSE, POC Collection Time: 11/20/18  9:15 PM  
Result Value Ref Range Glucose (POC) 247 (H) 65 - 100 mg/dL Performed by Aayush Tiwari ([facility]) CBC WITH AUTOMATED DIFF Collection Time: 11/21/18  3:49 AM  
Result Value Ref Range WBC 11.8 (H) 3.6 - 11.0 K/uL  
 RBC 4.60 3.80 - 5.20 M/uL  
 HGB 13.2 11.5 - 16.0 g/dL HCT 41.3 35.0 - 47.0 % MCV 89.8 80.0 - 99.0 FL  
 MCH 28.7 26.0 - 34.0 PG  
 MCHC 32.0 30.0 - 36.5 g/dL  
 RDW 14.9 (H) 11.5 - 14.5 % PLATELET 671 829 - 797 K/uL MPV 10.1 8.9 - 12.9 FL  
 NRBC 0.0 0  WBC ABSOLUTE NRBC 0.00 0.00 - 0.01 K/uL NEUTROPHILS 84 (H) 32 - 75 % LYMPHOCYTES 6 (L) 12 - 49 % MONOCYTES 6 5 - 13 % EOSINOPHILS 0 0 - 7 % BASOPHILS 0 0 - 1 % IMMATURE GRANULOCYTES 4 (H) 0.0 - 0.5 % ABS. NEUTROPHILS 9.9 (H) 1.8 - 8.0 K/UL  
 ABS. LYMPHOCYTES 0.7 (L) 0.8 - 3.5 K/UL ABS. MONOCYTES 0.7 0.0 - 1.0 K/UL  
 ABS. EOSINOPHILS 0.0 0.0 - 0.4 K/UL  
 ABS. BASOPHILS 0.0 0.0 - 0.1 K/UL  
 ABS. IMM. GRANS. 0.5 (H) 0.00 - 0.04 K/UL  
 DF SMEAR SCANNED    
 RBC COMMENTS NORMOCYTIC, NORMOCHROMIC METABOLIC PANEL, COMPREHENSIVE Collection Time: 11/21/18  3:49 AM  
Result Value Ref Range Sodium 135 (L) 136 - 145 mmol/L Potassium 3.7 3.5 - 5.1 mmol/L Chloride 101 97 - 108 mmol/L  
 CO2 24 21 - 32 mmol/L Anion gap 10 5 - 15 mmol/L Glucose 264 (H) 65 - 100 mg/dL BUN 25 (H) 6 - 20 MG/DL Creatinine 0.89 0.55 - 1.02 MG/DL  
 BUN/Creatinine ratio 28 (H) 12 - 20 GFR est AA >60 >60 ml/min/1.73m2 GFR est non-AA >60 >60 ml/min/1.73m2 Calcium 8.3 (L) 8.5 - 10.1 MG/DL Bilirubin, total 0.4 0.2 - 1.0 MG/DL  
 ALT (SGPT) 49 12 - 78 U/L  
 AST (SGOT) 15 15 - 37 U/L Alk. phosphatase 117 45 - 117 U/L Protein, total 5.9 (L) 6.4 - 8.2 g/dL Albumin 2.5 (L) 3.5 - 5.0 g/dL Globulin 3.4 2.0 - 4.0 g/dL A-G Ratio 0.7 (L) 1.1 - 2.2 MAGNESIUM Collection Time: 11/21/18  3:49 AM  
Result Value Ref Range Magnesium 1.9 1.6 - 2.4 mg/dL GLUCOSE, POC Collection Time: 11/21/18  7:03 AM  
Result Value Ref Range Glucose (POC) 216 (H) 65 - 100 mg/dL Performed by Deyanira Craig (PCT) Medications reviewed Current Facility-Administered Medications Medication Dose Route Frequency  calcitonin (salmon) (MIACALCIN) nasal 1 Spray  1 [place] IntraNASal DAILY  enoxaparin (LOVENOX) injection 40 mg  40 mg SubCUTAneous Q24H  carvedilol (COREG) tablet 25 mg  25 mg Oral BID WITH MEALS  predniSONE (DELTASONE) tablet 20 mg  20 mg Oral BID WITH MEALS  clopidogrel (PLAVIX) tablet 75 mg  75 mg Oral DAILY  arformoterol (BROVANA) neb solution 15 mcg  15 mcg Nebulization BID RT  
 budesonide (PULMICORT) 500 mcg/2 ml nebulizer suspension  500 mcg Nebulization BID RT  
 albuterol-ipratropium (DUO-NEB) 2.5 MG-0.5 MG/3 ML  3 mL Nebulization Q6H RT  
  DULoxetine (CYMBALTA) capsule 60 mg  60 mg Oral DAILY  isosorbide mononitrate ER (IMDUR) tablet 30 mg  30 mg Oral DAILY  lisinopril (PRINIVIL, ZESTRIL) tablet 5 mg  5 mg Oral DAILY  montelukast (SINGULAIR) tablet 10 mg  10 mg Oral DAILY  sAXagliptin (ONGLYZA) tablet 5 mg  5 mg Oral DAILY  sodium chloride (NS) flush 5-10 mL  5-10 mL IntraVENous Q8H  
 sodium chloride (NS) flush 5-10 mL  5-10 mL IntraVENous PRN  
 acetaminophen (TYLENOL) tablet 650 mg  650 mg Oral Q4H PRN  
 oxyCODONE-acetaminophen (PERCOCET) 5-325 mg per tablet 1 Tab  1 Tab Oral Q4H PRN  
 HYDROmorphone (PF) (DILAUDID) injection 0.5 mg  0.5 mg IntraVENous Q4H PRN  prochlorperazine (COMPAZINE) injection 10 mg  10 mg IntraVENous Q6H PRN  
 zolpidem (AMBIEN) tablet 5 mg  5 mg Oral QHS PRN  
 insulin lispro (HUMALOG) injection   SubCUTAneous QID WITH MEALS  glucose chewable tablet 16 g  4 Tab Oral PRN  
 dextrose (D50W) injection syrg 12.5-25 g  25-50 mL IntraVENous PRN  
 glucagon (GLUCAGEN) injection 1 mg  1 mg IntraMUSCular PRN  pantoprazole (PROTONIX) tablet 40 mg  40 mg Oral ACB&D Care Plan discussed with: Patient and Nurse Total time spent with patient: 30 minutes.  
 
Remington Marrero MD

## 2018-11-21 NOTE — PROGRESS NOTES
Per chart review, Pt is planning to return to [facility] on today, then return to [facility] on Monday to have OP Kyphoplasty. Update: Per chart review, Pt to have Kyphoplasty done on Monday 11.26.18 at [facility]. Pt will transfer from [facility] to [facility] on Monday morning. Shorty [address], BS, [facility]

## 2018-11-21 NOTE — ROUTINE PROCESS
Bedside and Verbal shift change report given to Jerome Vallecillo (oncoming nurse) by Tye Renee (offgoing nurse). Report included the following information SBAR, Kardex, MAR, Accordion and Recent Results.

## 2018-11-21 NOTE — PROGRESS NOTES
Bedside and Verbal shift change report given to Ash Perez RN (oncoming nurse) by Rossy Rivers RN (offgoing nurse). Report included the following information SBAR, Kardex, Intake/Output, MAR and Recent Results.

## 2018-11-21 NOTE — PROGRESS NOTES
Bedside and Verbal shift change report given to [address] rn  (oncoming nurse) by Berenice Gregory  (offgoing nurse). Report included the following information SBAR and Kardex.

## 2018-11-21 NOTE — PROGRESS NOTES
Orthopaedic Progress Note 2018 11:14 AM  
 
Patient: Aleida Mclean MRN: [id number]  SSN: [id number] YOB: 1945  Age: 81 y.o. Sex: female Admit date:  2018 Admitting Physician:  Kati Serna MD  
Consulting Physician(s): Treatment Team: Attending Provider: Tanja No MD; Consulting Provider: Azul Au MD; Hospitalist: Azul Au MD; Consulting Provider: Raimundo Arceo; Physician: Tanja No MD; Utilization Review: Eliceo Navarrete RN; Surgeon: Keyon Berger MD; Care Manager: Alfonso Bauer 
 
SUBJECTIVE: 
  
Aleida Mclean is a 81 y.o. female is resting in bed. She has no complaints currently. No complaints of nausea, vomiting, dizziness, lightheadedness, chest pain, or shortness of breath. OBJECTIVE: 
 
  
Physical Exam: 
General: Alert, cooperative, no distress. Respiratory: Respirations unlabored Neurological:  Neurovascular exam within normal limits. Motor: + DF/PF. Musculoskeletal: Calves soft, supple, non-tender upon palpation. Vital Signs:  
  
 
Patient Vitals for the past 8 hrs: 
 BP Temp Pulse Resp SpO2  
18 0811 134/70 98.5 °F (36.9 °C) (!) 103 16 96 % 18 0759     97 % 18 0340 126/71 97.5 °F (36.4 °C) 99 17 97 % Temp (24hrs), Av.9 °F (36.6 °C), Min:97.4 °F (36.3 °C), Max:98.5 °F (36.9 °C) Labs:  
  
 
Recent Labs  
  18 
4150 HCT 41.3 HGB 13.2 Lab Results Component Value Date/Time Sodium 135 (L) 2018 03:49 AM  
 Potassium 3.7 2018 03:49 AM  
 Chloride 101 2018 03:49 AM  
 CO2 24 2018 03:49 AM  
 Glucose 264 (H) 2018 03:49 AM  
 BUN 25 (H) 2018 03:49 AM  
 Creatinine 0.89 2018 03:49 AM  
 Calcium 8.3 (L) 2018 03:49 AM  
 
 
PT/OT:  
 
  
Gait Base of Support: Widened Speed/Es: Slow Ambulation - Level of Assistance: Minimal assistance, Additional time, Assist x2 Distance (ft): 5 Feet (ft) Assistive Device: Walker, rolling, Gait belt, Brace/Splint Patient mobility Bed Mobility Training Rolling: Minimum assistance, Additional time, Assist x2 Supine to Sit: Minimum assistance, Additional time, Assist x2 Sit to Supine: Minimum assistance, Additional time, Assist x2 Scooting: Minimum assistance, Additional time, Assist x2 Transfer Training Sit to Stand: Minimum assistance, Additional time, Assist x2 Stand to Sit: Minimum assistance, Additional time, Assist x2 Bed to Chair: Minimum assistance, Additional time, Assist x2 Gait Training Assistive Device: Walker, rolling, Gait belt, Brace/Splint Ambulation - Level of Assistance: Minimal assistance, Additional time, Assist x2 Distance (ft): 5 Feet (ft) ASSESSMENT / PLAN:  
Principal Problem: 
  Dyspnea (11/13/2018) Active Problems: 
  Type II diabetes mellitus ([facility] (10/9/2015) Coronary artery disease involving native coronary artery without angina pectoris (1/22/2016) Essential hypertension (1/22/2016) Chronic anticoagulation (3/30/2018) ARF (acute renal failure) ([facility] (11/13/2018) Obesity (BMI 30-39.9) (11/13/2018) Closed wedge compression fracture of T7 vertebra ([facility] (11/13/2018) A: 1. T7 compression fracture P: 1. T7 compression fracture: Plan for kyphoplasty on Monday. Reading notes from RN apparently can't have procedure Monday. I would have primary team set up for procedure at [facility]. Patient will have to go to rehab if she cant not be done Monday. Signed By: 
SALIMA Shelley [address] [facility]

## 2018-11-22 LAB
ANION GAP SERPL CALC-SCNC: 10 MMOL/L (ref 5–15)
BASOPHILS # BLD: 0 K/UL (ref 0–0.1)
BASOPHILS NFR BLD: 0 % (ref 0–1)
BUN SERPL-MCNC: 26 MG/DL (ref 6–20)
BUN/CREAT SERPL: 26 (ref 12–20)
CALCIUM SERPL-MCNC: 8.5 MG/DL (ref 8.5–10.1)
CHLORIDE SERPL-SCNC: 101 MMOL/L (ref 97–108)
CO2 SERPL-SCNC: 26 MMOL/L (ref 21–32)
CREAT SERPL-MCNC: 1 MG/DL (ref 0.55–1.02)
DIFFERENTIAL METHOD BLD: ABNORMAL
EOSINOPHIL # BLD: 0 K/UL (ref 0–0.4)
EOSINOPHIL NFR BLD: 0 % (ref 0–7)
ERYTHROCYTE [DISTWIDTH] IN BLOOD BY AUTOMATED COUNT: 15 % (ref 11.5–14.5)
GLUCOSE BLD STRIP.AUTO-MCNC: 165 MG/DL (ref 65–100)
GLUCOSE BLD STRIP.AUTO-MCNC: 201 MG/DL (ref 65–100)
GLUCOSE BLD STRIP.AUTO-MCNC: 208 MG/DL (ref 65–100)
GLUCOSE BLD STRIP.AUTO-MCNC: 324 MG/DL (ref 65–100)
GLUCOSE SERPL-MCNC: 185 MG/DL (ref 65–100)
HCT VFR BLD AUTO: 41.7 % (ref 35–47)
HGB BLD-MCNC: 13.8 G/DL (ref 11.5–16)
IMM GRANULOCYTES # BLD: 0 K/UL
IMM GRANULOCYTES NFR BLD AUTO: 0 %
LYMPHOCYTES # BLD: 2.2 K/UL (ref 0.8–3.5)
LYMPHOCYTES NFR BLD: 17 % (ref 12–49)
MAGNESIUM SERPL-MCNC: 1.8 MG/DL (ref 1.6–2.4)
MCH RBC QN AUTO: 29.7 PG (ref 26–34)
MCHC RBC AUTO-ENTMCNC: 33.1 G/DL (ref 30–36.5)
MCV RBC AUTO: 89.9 FL (ref 80–99)
MONOCYTES # BLD: 0.1 K/UL (ref 0–1)
MONOCYTES NFR BLD: 1 % (ref 5–13)
NEUTS BAND NFR BLD MANUAL: 1 % (ref 0–6)
NEUTS SEG # BLD: 10.5 K/UL (ref 1.8–8)
NEUTS SEG NFR BLD: 81 % (ref 32–75)
NRBC # BLD: 0 K/UL (ref 0–0.01)
NRBC BLD-RTO: 0 PER 100 WBC
PLATELET # BLD AUTO: 206 K/UL (ref 150–400)
PMV BLD AUTO: 10 FL (ref 8.9–12.9)
POTASSIUM SERPL-SCNC: 3.1 MMOL/L (ref 3.5–5.1)
RBC # BLD AUTO: 4.64 M/UL (ref 3.8–5.2)
RBC MORPH BLD: ABNORMAL
SERVICE CMNT-IMP: ABNORMAL
SODIUM SERPL-SCNC: 137 MMOL/L (ref 136–145)
WBC # BLD AUTO: 12.8 K/UL (ref 3.6–11)

## 2018-11-22 PROCEDURE — 80048 BASIC METABOLIC PNL TOTAL CA: CPT

## 2018-11-22 PROCEDURE — 36415 COLL VENOUS BLD VENIPUNCTURE: CPT

## 2018-11-22 PROCEDURE — 94640 AIRWAY INHALATION TREATMENT: CPT

## 2018-11-22 PROCEDURE — 74011250636 HC RX REV CODE- 250/636: Performed by: SPECIALIST

## 2018-11-22 PROCEDURE — 74011636637 HC RX REV CODE- 636/637: Performed by: FAMILY MEDICINE

## 2018-11-22 PROCEDURE — 82962 GLUCOSE BLOOD TEST: CPT

## 2018-11-22 PROCEDURE — 65270000029 HC RM PRIVATE

## 2018-11-22 PROCEDURE — 94760 N-INVAS EAR/PLS OXIMETRY 1: CPT

## 2018-11-22 PROCEDURE — 74011250637 HC RX REV CODE- 250/637: Performed by: INTERNAL MEDICINE

## 2018-11-22 PROCEDURE — 83735 ASSAY OF MAGNESIUM: CPT

## 2018-11-22 PROCEDURE — 77010033678 HC OXYGEN DAILY

## 2018-11-22 PROCEDURE — 74011000250 HC RX REV CODE- 250: Performed by: INTERNAL MEDICINE

## 2018-11-22 PROCEDURE — 85025 COMPLETE CBC W/AUTO DIFF WBC: CPT

## 2018-11-22 RX ORDER — ENOXAPARIN SODIUM 100 MG/ML
40 INJECTION SUBCUTANEOUS EVERY 24 HOURS
Status: DISCONTINUED | OUTPATIENT
Start: 2018-11-23 | End: 2018-11-22

## 2018-11-22 RX ORDER — ENOXAPARIN SODIUM 100 MG/ML
40 INJECTION SUBCUTANEOUS EVERY 24 HOURS
Status: DISCONTINUED | OUTPATIENT
Start: 2018-11-24 | End: 2018-11-23

## 2018-11-22 RX ADMIN — PANTOPRAZOLE SODIUM 40 MG: 40 TABLET, DELAYED RELEASE ORAL at 17:53

## 2018-11-22 RX ADMIN — SAXAGLIPTIN 5 MG: 5 TABLET, FILM COATED ORAL at 09:46

## 2018-11-22 RX ADMIN — CARVEDILOL 25 MG: 12.5 TABLET, FILM COATED ORAL at 09:42

## 2018-11-22 RX ADMIN — INSULIN LISPRO 3 UNITS: 100 INJECTION, SOLUTION INTRAVENOUS; SUBCUTANEOUS at 12:00

## 2018-11-22 RX ADMIN — ZOLPIDEM TARTRATE 5 MG: 5 TABLET ORAL at 21:17

## 2018-11-22 RX ADMIN — IPRATROPIUM BROMIDE AND ALBUTEROL SULFATE 3 ML: .5; 3 SOLUTION RESPIRATORY (INHALATION) at 13:51

## 2018-11-22 RX ADMIN — CARVEDILOL 25 MG: 12.5 TABLET, FILM COATED ORAL at 17:53

## 2018-11-22 RX ADMIN — INSULIN LISPRO 4 UNITS: 100 INJECTION, SOLUTION INTRAVENOUS; SUBCUTANEOUS at 07:24

## 2018-11-22 RX ADMIN — INSULIN LISPRO 10 UNITS: 100 INJECTION, SOLUTION INTRAVENOUS; SUBCUTANEOUS at 22:35

## 2018-11-22 RX ADMIN — OXYCODONE AND ACETAMINOPHEN 1 TABLET: 5; 325 TABLET ORAL at 21:17

## 2018-11-22 RX ADMIN — MONTELUKAST SODIUM 10 MG: 10 TABLET, COATED ORAL at 09:41

## 2018-11-22 RX ADMIN — Medication 10 ML: at 14:00

## 2018-11-22 RX ADMIN — BUDESONIDE 500 MCG: 0.5 INHALANT RESPIRATORY (INHALATION) at 07:40

## 2018-11-22 RX ADMIN — Medication 10 ML: at 22:00

## 2018-11-22 RX ADMIN — IPRATROPIUM BROMIDE AND ALBUTEROL SULFATE 3 ML: .5; 3 SOLUTION RESPIRATORY (INHALATION) at 07:40

## 2018-11-22 RX ADMIN — LISINOPRIL 5 MG: 5 TABLET ORAL at 09:42

## 2018-11-22 RX ADMIN — PREDNISONE 10 MG: 5 TABLET ORAL at 12:00

## 2018-11-22 RX ADMIN — ISOSORBIDE MONONITRATE 30 MG: 30 TABLET, EXTENDED RELEASE ORAL at 09:41

## 2018-11-22 RX ADMIN — DULOXETINE 60 MG: 30 CAPSULE, DELAYED RELEASE ORAL at 09:42

## 2018-11-22 RX ADMIN — INSULIN LISPRO 4 UNITS: 100 INJECTION, SOLUTION INTRAVENOUS; SUBCUTANEOUS at 18:00

## 2018-11-22 RX ADMIN — CALCITONIN SALMON 1 SPRAY: 200 SPRAY, METERED NASAL at 09:50

## 2018-11-22 RX ADMIN — PREDNISONE 10 MG: 5 TABLET ORAL at 09:42

## 2018-11-22 RX ADMIN — PREDNISONE 10 MG: 5 TABLET ORAL at 17:53

## 2018-11-22 RX ADMIN — Medication 5 ML: at 06:00

## 2018-11-22 RX ADMIN — ENOXAPARIN SODIUM 40 MG: 40 INJECTION SUBCUTANEOUS at 09:42

## 2018-11-22 RX ADMIN — IPRATROPIUM BROMIDE AND ALBUTEROL SULFATE 3 ML: .5; 3 SOLUTION RESPIRATORY (INHALATION) at 20:19

## 2018-11-22 RX ADMIN — PANTOPRAZOLE SODIUM 40 MG: 40 TABLET, DELAYED RELEASE ORAL at 06:46

## 2018-11-22 RX ADMIN — BUDESONIDE 500 MCG: 0.5 INHALANT RESPIRATORY (INHALATION) at 20:19

## 2018-11-22 NOTE — PROGRESS NOTES
Bedside and Verbal shift change report given to [address] [place] [address] (oncoming nurse) by Charlee Atkinson (offgoing nurse). Report included the following information SBAR, Kardex, MAR and Recent Results.

## 2018-11-22 NOTE — ROUTINE PROCESS
Bedside shift change report given to [address] [place] [address] [address] (oncoming nurse) by Stephania Randolph (offgoing nurse).  Report included the following information SBAR, Kardex, Intake/Output, MAR, Accordion, Recent Results and Med Rec Status.

## 2018-11-22 NOTE — PROGRESS NOTES
Pharmacists called requesting to confirm start time and date of Lovenox. Called Dr. Cinthia Wesley to confirm restart day and time of Lovenox. New order received. Hold Lovenox tomorrow for procedure. Start Lovenox as previously scheduled the day after procedure.

## 2018-11-22 NOTE — PROGRESS NOTES
Daily Progress Note: 11/22/2018 Kala Cowan MD 
 
Assessment/Plan: Dyspnea (11/13/2018) improved - workup with Pul and Card. - Pulmonary following and now on po steroids, V/Q scan with low prob for PE 
            - consulted Orthopedics - Kyphoplasty planned for Monday at [address] [place] [address]. At this point, pt will still be inpt and therefore will need transfer to [address] [place] [address] 
  
  Type II diabetes mellitus ([facility] [address] (10/9/2015) - with reported nephropathy with proteinuria without CKD 
            - continue saxagliptin and follow on SSI 
  
  Coronary artery disease involving native coronary artery without angina pectoris (1/22/2016) - stable, continue cardiac meds 
            - Hold plavix today for washout before kyphoplasty 
  
  Essential hypertension (1/22/2016) - cont home meds - adjust as needed 
  
  Chronic anticoagulation (3/30/2018) - holding Xarelto for possible kyphoplasty- on Lovenox 
  
  Obesity, BMI 30-39. 9([facility]) (10/1/2018) - counseled on weight loss, this is contributing to her current symptoms 
  
  ARF (acute renal failure) ([facility] [address] (11/13/2018) - creatinine up acutely from prior, possibly due to diuresis 
            - hold diuretic  
  
  Closed wedge compression fracture of T7 vertebra ([facility] [address] (11/13/2018) - consulted Orthopedics, hopefully she will be a candidate for kyphoplasty 
            - cardiology ok with holding anticoags for procedure Will hold lovenox starting 11/25 Code status: 
            - patient is FULL CODE, no AMD on file, her daughter Judy Gallagher is her surrogate 
   
 
Problem List: 
Problem List as of 11/22/2018 Date Reviewed: 11/13/2018 Codes Class Noted - Resolved * (Principal) Dyspnea ICD-10-CM: R06.00 
ICD-9-CM: 786.09  11/13/2018 - Present ARF (acute renal failure) (HCC) ICD-10-CM: N17.9 ICD-9-CM: 584.9  11/13/2018 - Present Obesity (BMI 30-39.9) (Chronic) ICD-10-CM: R03.9 ICD-9-CM: 278.00  11/13/2018 - Present Closed wedge compression fracture of T7 vertebra ([place] [address] ICD-10-CM: F13.888W ICD-9-CM: 805.2  11/13/2018 - Present Type 2 diabetes with nephropathy ([place] [address] ICD-10-CM: E11.21 
ICD-9-CM: 250.40, 583.81  10/1/2018 - Present Chest pain ICD-10-CM: R07.9 ICD-9-CM: 786.50  6/27/2018 - Present Generalized abdominal pain ICD-10-CM: R10.84 ICD-9-CM: 789.07  6/27/2018 - Present Recurrent deep vein thrombosis (DVT) (HCC) ICD-10-CM: I82.409 ICD-9-CM: 453.40  3/30/2018 - Present Chronic anticoagulation (Chronic) ICD-10-CM: Z79.01 
ICD-9-CM: V58.61  3/30/2018 - Present Coronary artery disease involving native coronary artery without angina pectoris (Chronic) ICD-10-CM: I25.10 ICD-9-CM: 414.01  1/22/2016 - Present Essential hypertension (Chronic) ICD-10-CM: I10 
ICD-9-CM: 401.9  1/22/2016 - Present Type II diabetes mellitus (HCC) (Chronic) ICD-10-CM: E11.9 ICD-9-CM: 250.00  10/9/2015 - Present NSTEMI (non-ST elevated myocardial infarction) ([place] [address] ICD-10-CM: I21.4 ICD-9-CM: 410.70  10/9/2015 - Present RESOLVED: CAD (coronary artery disease) ICD-10-CM: I25.10 ICD-9-CM: 414.00  10/9/2015 - 1/22/2016 RESOLVED: HTN (hypertension) ICD-10-CM: I10 
ICD-9-CM: 401.9  10/9/2015 - 1/22/2016 Subjective:  
  81 y.o.  female who is admitted with SOB. Ms. Chapo Reilly presented to the Emergency Department this PM complaining of SOB: for the past 3 months, worked up by Pulmonary and Cardiology as an outpatient without any definite diagnosis, not hypoxic, associated with back pain, unable to take a deep breath without pain, back pain started at the same time as the SOB. History obtained from chart review and the patient. Previous records reviewed, recent admit for chest pain. (Dr Camilla Clifton). 11/14:  Feels much better this AM with less back pain on meds. Still very tender over site with any palpation. Less air hunger but still \"not breathing back to normal yet. \"  Glucoses up with the steroids. Pul and Card seeing also. 11/15:    Reports she continues to feel better and reports no longer SOB. She is not moving much due to back pain. No chest pains. Ortho to decide next step for compression fx. No complaints of weakness or numbness LEs. 
 
11/16:  No longer short of breath - will wean steroids to po. IR plans kyphoplasty today. May benefit from Prolia for osteoporosis outpt  
 
11/17/18: Transferred to ICU yesterday for A-fib w/ RVR. Converted after Dilt bolus. Now sinus tach in the 100s. Coreg increased this am. She is not requiring a lot of pain meds but also not moving a lot. Xarelto being held and she is on Lovenox now. 11/18/18: Transferred out of ICU yesterday. Having pain with any movement. Dr Janette López would like her to stay on Plavix but is ok holding Xarelto/Lovenox for kyphoplasty. Will need to see if IR can do kyphoplasty on Plavix. Ortho getting her fitted for brace. Breathing well. No SOB. 11/19:  Little pain unless she moves about. Kyphoplasty planned for today. 11/20/18: Kyphoplasty now planned for Monday after holding Plavix. She would like to go home and return on Monday as an outpatient for the procedure. Was able to sit in chair yesterday. 11/22:  Pt was seen by PT yesterday. Rec that she needs SNF or rehab. She wants to stay here until MOnday when she gets kyphoplasty at [facility]. The logistics of this have not been coordinated yet. Will wait until tomorrow when IR is open to further figure out her transfer. Tolerating PO, +BM. 
  
Review of Systems: A comprehensive review of systems was negative except for that written in the HPI. Objective:  
Physical Exam:  
 
Visit Vitals /84 (BP 1 Location: Right arm, BP Patient Position: At rest) Pulse 95 Temp 98.2 °F (36.8 °C) Resp 16 Ht 5' 7\" (1.702 m) Wt 99.4 kg (219 lb 3.2 oz) SpO2 95% Breastfeeding? No  
BMI 35.38 kg/m² O2 Flow Rate (L/min): 2 l/min O2 Device: Nasal cannula Temp (24hrs), Av.9 °F (36.6 °C), Min:97.5 °F (36.4 °C), Max:98.2 °F (36.8 °C) No intake/output data recorded.  1901 -  0700 In: 240 [P.O.:240] Out: 1000 [Urine:1000] General:  Alert, cooperative, obese, no distress, appears stated age. Head:  Normocephalic, without obvious abnormality, atraumatic. Eyes:  Conjunctivae/corneas clear. PERRL, EOMs intact. Throat: Lips, mucosa, and tongue moist.  
Neck: Supple, symmetrical, trachea midline, no adenopathy, thyroid: no enlargement/tenderness/nodules, no carotid bruit and no JVD. Back:   Symmetric,with marked tenderness to palpation over T6-7-8 area. Lungs:   Diminished BS bilaterally but otherwise clear at this time. Chest wall:  No tenderness or deformity. Heart:  Irregular rate and rhythm, no murmur, click, rub or gallop. Abdomen:   Soft, non-tender. Bowel sounds normal. No masses,  No organomegaly. Extremities: no cyanosis. Trace LE edema. No calf tenderness or cords. Skin:  turgor normal.  Scabbed ulcers on forearms Neurologic: CNII-XII intact. Alert and oriented X 3. Fine motor of hands and fingers normal.   equal.  Gait not tested at this time. Sensation grossly normal to touch. Gross motor of extremities normal.    
 
Data Review:  
  CT of Chest 18 FINDINGS: 
THYROID: No nodule. MEDIASTINUM: No mass or lymphadenopathy. SONJA: No mass or lymphadenopathy. THORACIC AORTA: No aneurysm. Mild vascular calcifications MAIN PULMONARY ARTERY: Normal in caliber. TRACHEA/BRONCHI: Patent. ESOPHAGUS: No wall thickening or dilatation. Small hiatal hernia HEART: Normal in size. PLEURA: No effusion or pneumothorax. LUNGS: No nodule, mass, or airspace disease. Bilateral atelectatic changes. INCIDENTALLY IMAGED UPPER ABDOMEN: No focal abnormality. BONES: Bones are osteopenic. There is collapse of the superior endplate of T7. This has occurred since June 2018 Possible milk of calcium cyst within the right breast 
IMPRESSION: 
1. No acute cardiopulmonary disease 2. Interval collapse superior endplate of T7. The patient has back pain this may 
be acute to subacute and MR imaging would better characterize 
  
Recent Days: 
Recent Labs  
  11/21/18 
0349 11/20/18 
0543 WBC 11.8* 11.1* HGB 13.2 13.6 HCT 41.3 43.3  206 Recent Labs  
  11/22/18 
0304 11/21/18 
0349 11/20/18 
0543 NA  --  135* 138 K  --  3.7 4.4  
CL  --  101 102 CO2  --  24 28 GLU  --  264* 242* BUN  --  25* 27* CREA  --  0.89 1.10* CA  --  8.3* 8.7 MG 1.8 1.9 2.0 ALB  --  2.5* 2.6* TBILI  --  0.4 0.5 SGOT  --  15 19 ALT  --  49 50 No results for input(s): PH, PCO2, PO2, HCO3, FIO2 in the last 72 hours. 24 Hour Results: 
Recent Results (from the past 24 hour(s)) GLUCOSE, POC Collection Time: 11/21/18 11:31 AM  
Result Value Ref Range Glucose (POC) 201 (H) 65 - 100 mg/dL Performed by Ara Sandoval ([facility]) GLUCOSE, POC Collection Time: 11/21/18  4:55 PM  
Result Value Ref Range Glucose (POC) 247 (H) 65 - 100 mg/dL Performed by Sharon Pantoja ([facility]) GLUCOSE, POC Collection Time: 11/21/18  9:33 PM  
Result Value Ref Range Glucose (POC) 293 (H) 65 - 100 mg/dL Performed by Sharon Pantoja ([facility]) MAGNESIUM Collection Time: 11/22/18  3:04 AM  
Result Value Ref Range Magnesium 1.8 1.6 - 2.4 mg/dL GLUCOSE, POC Collection Time: 11/22/18  6:51 AM  
Result Value Ref Range Glucose (POC) 208 (H) 65 - 100 mg/dL Performed by Charmayne Pae ([facility]) Medications reviewed Current Facility-Administered Medications Medication Dose Route Frequency  predniSONE (DELTASONE) tablet 10 mg  10 mg Oral TID WITH MEALS  
  [START ON 11/27/2018] clopidogrel (PLAVIX) tablet 75 mg  75 mg Oral DAILY  calcitonin (salmon) (MIACALCIN) nasal 1 Spray  1 Gary IntraNASal DAILY  enoxaparin (LOVENOX) injection 40 mg  40 mg SubCUTAneous Q24H  carvedilol (COREG) tablet 25 mg  25 mg Oral BID WITH MEALS  
 arformoterol (BROVANA) neb solution 15 mcg  15 mcg Nebulization BID RT  
 budesonide (PULMICORT) 500 mcg/2 ml nebulizer suspension  500 mcg Nebulization BID RT  
 albuterol-ipratropium (DUO-NEB) 2.5 MG-0.5 MG/3 ML  3 mL Nebulization Q6H RT  
 DULoxetine (CYMBALTA) capsule 60 mg  60 mg Oral DAILY  isosorbide mononitrate ER (IMDUR) tablet 30 mg  30 mg Oral DAILY  lisinopril (PRINIVIL, ZESTRIL) tablet 5 mg  5 mg Oral DAILY  montelukast (SINGULAIR) tablet 10 mg  10 mg Oral DAILY  sAXagliptin (ONGLYZA) tablet 5 mg  5 mg Oral DAILY  sodium chloride (NS) flush 5-10 mL  5-10 mL IntraVENous Q8H  
 sodium chloride (NS) flush 5-10 mL  5-10 mL IntraVENous PRN  
 acetaminophen (TYLENOL) tablet 650 mg  650 mg Oral Q4H PRN  
 oxyCODONE-acetaminophen (PERCOCET) 5-325 mg per tablet 1 Tab  1 Tab Oral Q4H PRN  
 HYDROmorphone (PF) (DILAUDID) injection 0.5 mg  0.5 mg IntraVENous Q4H PRN  prochlorperazine (COMPAZINE) injection 10 mg  10 mg IntraVENous Q6H PRN  
 zolpidem (AMBIEN) tablet 5 mg  5 mg Oral QHS PRN  
 insulin lispro (HUMALOG) injection   SubCUTAneous QID WITH MEALS  glucose chewable tablet 16 g  4 Tab Oral PRN  
 dextrose (D50W) injection syrg 12.5-25 g  25-50 mL IntraVENous PRN  
 glucagon (GLUCAGEN) injection 1 mg  1 mg IntraMUSCular PRN  pantoprazole (PROTONIX) tablet 40 mg  40 mg Oral ACB&D Care Plan discussed with: Patient and Nurse Total time spent with patient: 30 minutes.  
 
Marcela Canales MD

## 2018-11-23 LAB
ANION GAP SERPL CALC-SCNC: 10 MMOL/L (ref 5–15)
BASOPHILS # BLD: 0 K/UL (ref 0–0.1)
BASOPHILS NFR BLD: 0 % (ref 0–1)
BUN SERPL-MCNC: 32 MG/DL (ref 6–20)
BUN/CREAT SERPL: 29 (ref 12–20)
CALCIUM SERPL-MCNC: 8.3 MG/DL (ref 8.5–10.1)
CHLORIDE SERPL-SCNC: 103 MMOL/L (ref 97–108)
CO2 SERPL-SCNC: 24 MMOL/L (ref 21–32)
CREAT SERPL-MCNC: 1.1 MG/DL (ref 0.55–1.02)
DIFFERENTIAL METHOD BLD: ABNORMAL
EOSINOPHIL # BLD: 0 K/UL (ref 0–0.4)
EOSINOPHIL NFR BLD: 0 % (ref 0–7)
ERYTHROCYTE [DISTWIDTH] IN BLOOD BY AUTOMATED COUNT: 15.2 % (ref 11.5–14.5)
GLUCOSE BLD STRIP.AUTO-MCNC: 147 MG/DL (ref 65–100)
GLUCOSE BLD STRIP.AUTO-MCNC: 158 MG/DL (ref 65–100)
GLUCOSE BLD STRIP.AUTO-MCNC: 210 MG/DL (ref 65–100)
GLUCOSE BLD STRIP.AUTO-MCNC: 223 MG/DL (ref 65–100)
GLUCOSE SERPL-MCNC: 259 MG/DL (ref 65–100)
HCT VFR BLD AUTO: 42 % (ref 35–47)
HGB BLD-MCNC: 13.3 G/DL (ref 11.5–16)
IMM GRANULOCYTES # BLD: 0 K/UL
IMM GRANULOCYTES NFR BLD AUTO: 0 %
LYMPHOCYTES # BLD: 1.4 K/UL (ref 0.8–3.5)
LYMPHOCYTES NFR BLD: 13 % (ref 12–49)
MAGNESIUM SERPL-MCNC: 1.9 MG/DL (ref 1.6–2.4)
MCH RBC QN AUTO: 29.1 PG (ref 26–34)
MCHC RBC AUTO-ENTMCNC: 31.7 G/DL (ref 30–36.5)
MCV RBC AUTO: 91.9 FL (ref 80–99)
MONOCYTES # BLD: 1.3 K/UL (ref 0–1)
MONOCYTES NFR BLD: 12 % (ref 5–13)
NEUTS BAND NFR BLD MANUAL: 8 % (ref 0–6)
NEUTS SEG # BLD: 8 K/UL (ref 1.8–8)
NEUTS SEG NFR BLD: 67 % (ref 32–75)
NRBC # BLD: 0 K/UL (ref 0–0.01)
NRBC BLD-RTO: 0 PER 100 WBC
PLATELET # BLD AUTO: 187 K/UL (ref 150–400)
PMV BLD AUTO: 10.5 FL (ref 8.9–12.9)
POTASSIUM SERPL-SCNC: ABNORMAL MMOL/L (ref 3.5–5.1)
RBC # BLD AUTO: 4.57 M/UL (ref 3.8–5.2)
RBC MORPH BLD: ABNORMAL
SERVICE CMNT-IMP: ABNORMAL
SODIUM SERPL-SCNC: 137 MMOL/L (ref 136–145)
WBC # BLD AUTO: 10.7 K/UL (ref 3.6–11)

## 2018-11-23 PROCEDURE — 74011250636 HC RX REV CODE- 250/636: Performed by: INTERNAL MEDICINE

## 2018-11-23 PROCEDURE — 74011636637 HC RX REV CODE- 636/637: Performed by: FAMILY MEDICINE

## 2018-11-23 PROCEDURE — 74011250637 HC RX REV CODE- 250/637: Performed by: INTERNAL MEDICINE

## 2018-11-23 PROCEDURE — 74011000250 HC RX REV CODE- 250: Performed by: INTERNAL MEDICINE

## 2018-11-23 PROCEDURE — 65270000029 HC RM PRIVATE

## 2018-11-23 PROCEDURE — 94640 AIRWAY INHALATION TREATMENT: CPT

## 2018-11-23 PROCEDURE — 83735 ASSAY OF MAGNESIUM: CPT

## 2018-11-23 PROCEDURE — 74011250636 HC RX REV CODE- 250/636: Performed by: FAMILY MEDICINE

## 2018-11-23 PROCEDURE — 77010033678 HC OXYGEN DAILY

## 2018-11-23 PROCEDURE — 80048 BASIC METABOLIC PNL TOTAL CA: CPT

## 2018-11-23 PROCEDURE — 36415 COLL VENOUS BLD VENIPUNCTURE: CPT

## 2018-11-23 PROCEDURE — 74011250637 HC RX REV CODE- 250/637: Performed by: FAMILY MEDICINE

## 2018-11-23 PROCEDURE — 94760 N-INVAS EAR/PLS OXIMETRY 1: CPT

## 2018-11-23 PROCEDURE — 85025 COMPLETE CBC W/AUTO DIFF WBC: CPT

## 2018-11-23 PROCEDURE — 77030038269 HC DRN EXT URIN PURWCK BARD -A

## 2018-11-23 PROCEDURE — 82962 GLUCOSE BLOOD TEST: CPT

## 2018-11-23 RX ORDER — CLOPIDOGREL BISULFATE 75 MG/1
75 TABLET ORAL DAILY
Status: DISCONTINUED | OUTPATIENT
Start: 2018-11-23 | End: 2018-11-25

## 2018-11-23 RX ORDER — ENOXAPARIN SODIUM 100 MG/ML
40 INJECTION SUBCUTANEOUS EVERY 24 HOURS
Status: DISCONTINUED | OUTPATIENT
Start: 2018-11-23 | End: 2018-11-28

## 2018-11-23 RX ADMIN — ENOXAPARIN SODIUM 40 MG: 40 INJECTION SUBCUTANEOUS at 11:16

## 2018-11-23 RX ADMIN — INSULIN LISPRO 3 UNITS: 100 INJECTION, SOLUTION INTRAVENOUS; SUBCUTANEOUS at 16:32

## 2018-11-23 RX ADMIN — MONTELUKAST SODIUM 10 MG: 10 TABLET, COATED ORAL at 08:46

## 2018-11-23 RX ADMIN — CLOPIDOGREL BISULFATE 75 MG: 75 TABLET ORAL at 11:16

## 2018-11-23 RX ADMIN — IPRATROPIUM BROMIDE AND ALBUTEROL SULFATE 3 ML: .5; 3 SOLUTION RESPIRATORY (INHALATION) at 07:48

## 2018-11-23 RX ADMIN — INSULIN LISPRO 3 UNITS: 100 INJECTION, SOLUTION INTRAVENOUS; SUBCUTANEOUS at 11:53

## 2018-11-23 RX ADMIN — Medication 10 ML: at 14:10

## 2018-11-23 RX ADMIN — CARVEDILOL 25 MG: 12.5 TABLET, FILM COATED ORAL at 16:32

## 2018-11-23 RX ADMIN — SAXAGLIPTIN 5 MG: 5 TABLET, FILM COATED ORAL at 08:50

## 2018-11-23 RX ADMIN — ISOSORBIDE MONONITRATE 30 MG: 30 TABLET, EXTENDED RELEASE ORAL at 08:46

## 2018-11-23 RX ADMIN — LISINOPRIL 5 MG: 5 TABLET ORAL at 08:46

## 2018-11-23 RX ADMIN — CARVEDILOL 25 MG: 12.5 TABLET, FILM COATED ORAL at 08:46

## 2018-11-23 RX ADMIN — PANTOPRAZOLE SODIUM 40 MG: 40 TABLET, DELAYED RELEASE ORAL at 07:30

## 2018-11-23 RX ADMIN — PROCHLORPERAZINE EDISYLATE 10 MG: 5 INJECTION INTRAMUSCULAR; INTRAVENOUS at 20:23

## 2018-11-23 RX ADMIN — INSULIN LISPRO 2 UNITS: 100 INJECTION, SOLUTION INTRAVENOUS; SUBCUTANEOUS at 22:58

## 2018-11-23 RX ADMIN — BUDESONIDE 500 MCG: 0.5 INHALANT RESPIRATORY (INHALATION) at 07:48

## 2018-11-23 RX ADMIN — BUDESONIDE 500 MCG: 0.5 INHALANT RESPIRATORY (INHALATION) at 19:43

## 2018-11-23 RX ADMIN — DULOXETINE 60 MG: 30 CAPSULE, DELAYED RELEASE ORAL at 08:46

## 2018-11-23 RX ADMIN — CALCITONIN SALMON 1 SPRAY: 200 SPRAY, METERED NASAL at 08:47

## 2018-11-23 RX ADMIN — Medication 10 ML: at 20:24

## 2018-11-23 RX ADMIN — PANTOPRAZOLE SODIUM 40 MG: 40 TABLET, DELAYED RELEASE ORAL at 16:32

## 2018-11-23 RX ADMIN — Medication 10 ML: at 07:59

## 2018-11-23 RX ADMIN — IPRATROPIUM BROMIDE AND ALBUTEROL SULFATE 3 ML: .5; 3 SOLUTION RESPIRATORY (INHALATION) at 19:43

## 2018-11-23 NOTE — PROGRESS NOTES
Physical therapy 1224 chart reviewed, spoke with RN. Pt declining to participate with physical Therapy. Stating sitting chair, yesterday was excruciating. offered to sit EOB, transfer to [facility], pt continued to decline, requesting bedpan/purwick. Expressed frustration she thought she was to undergo kyphoplasty procedure today. PT will continue to follow. Dean Weathers

## 2018-11-23 NOTE — PROGRESS NOTES
Bedside and Verbal shift change report given to Danelle JIMÉNEZ (oncoming nurse) by Gaila Sandhoff (offgoing nurse). Report included the following information SBAR, Kardex, Intake/Output, MAR and Recent Results.

## 2018-11-23 NOTE — PROGRESS NOTES
Spoke with patient's nurse to notify her that patient's Kyphoplasty would not be done today. Dr Simi Buck was to be getting in touch with Dr Mo Fuller regarding this decision. Advised nurse to contact Dr Mo Fuller to remove NPO order and to discuss next step in patients care.

## 2018-11-23 NOTE — PROGRESS NOTES
Daily Progress Note: 11/23/2018 Dolly Mckinnon MD 
 
Assessment/Plan: Dyspnea (11/13/2018) improved - workup with Pul and Card. - Pulmonary following and now on po steroids, V/Q scan with low prob for PE 
  
  Type II diabetes mellitus ([facility] (10/9/2015) - with reported nephropathy with proteinuria without CKD 
            - continue saxagliptin and follow on SSI 
  
  Coronary artery disease involving native coronary artery without angina pectoris (1/22/2016) - stable, continue cardiac meds 
            - Hold plavix, will restart after kypho 
  
  Essential hypertension (1/22/2016) - cont home meds - adjust as needed 
  
  Chronic anticoagulation (3/30/2018) -will restart lovenox after kypho 
  
  Obesity, BMI 30-39. 9(HCC) (10/1/2018) - counseled on weight loss, this is contributing to her current symptoms 
  
  ARF (acute renal failure) ([facility] [address] (11/13/2018) - creatinine up acutely from prior, possibly due to diuresis 
            - hold diuretic  
  
  Closed wedge compression fracture of T7 vertebra ([facility] [address] (11/13/2018) -ortho rec kypho -- scheduled today at 9AM with Dr Cal Felix 
            - cardiology ok with holding anticoags for procedure Code status: 
            - patient is FULL CODE, no AMD on file, her daughter Dominga Posada is her surrogate Dispo: If she is able to move better with PT/OT today, we can work on planning to discharge to home. If not, will need CM to set up SNF vs rehab 
   
 
Problem List: 
Problem List as of 11/23/2018 Date Reviewed: 11/13/2018 Codes Class Noted - Resolved * (Principal) Dyspnea ICD-10-CM: R06.00 
ICD-9-CM: 786.09  11/13/2018 - Present ARF (acute renal failure) (HCC) ICD-10-CM: N17.9 ICD-9-CM: 584.9  11/13/2018 - Present Obesity (BMI 30-39.9) (Chronic) ICD-10-CM: U84.5 ICD-9-CM: 278.00  11/13/2018 - Present Closed wedge compression fracture of T7 vertebra ([facility] ICD-10-CM: S96.296K ICD-9-CM: 805.2  11/13/2018 - Present Type 2 diabetes with nephropathy ([facility] ICD-10-CM: E11.21 
ICD-9-CM: 250.40, 583.81  10/1/2018 - Present Chest pain ICD-10-CM: R07.9 ICD-9-CM: 786.50  6/27/2018 - Present Generalized abdominal pain ICD-10-CM: R10.84 ICD-9-CM: 789.07  6/27/2018 - Present Recurrent deep vein thrombosis (DVT) (HCC) ICD-10-CM: I82.409 ICD-9-CM: 453.40  3/30/2018 - Present Chronic anticoagulation (Chronic) ICD-10-CM: Z79.01 
ICD-9-CM: V58.61  3/30/2018 - Present Coronary artery disease involving native coronary artery without angina pectoris (Chronic) ICD-10-CM: I25.10 ICD-9-CM: 414.01  1/22/2016 - Present Essential hypertension (Chronic) ICD-10-CM: I10 
ICD-9-CM: 401.9  1/22/2016 - Present Type II diabetes mellitus (HCC) (Chronic) ICD-10-CM: E11.9 ICD-9-CM: 250.00  10/9/2015 - Present NSTEMI (non-ST elevated myocardial infarction) ([facility] ICD-10-CM: I21.4 ICD-9-CM: 410.70  10/9/2015 - Present RESOLVED: CAD (coronary artery disease) ICD-10-CM: I25.10 ICD-9-CM: 414.00  10/9/2015 - 1/22/2016 RESOLVED: HTN (hypertension) ICD-10-CM: I10 
ICD-9-CM: 401.9  10/9/2015 - 1/22/2016 Subjective:  
  81 y.o.  female who is admitted with SOB. Ms. Pranav Kennedy presented to the Emergency Department this PM complaining of SOB: for the past 3 months, worked up by Pulmonary and Cardiology as an outpatient without any definite diagnosis, not hypoxic, associated with back pain, unable to take a deep breath without pain, back pain started at the same time as the SOB. History obtained from chart review and the patient. Previous records reviewed, recent admit for chest pain. (Dr Rashard Newell). 11/14:  Feels much better this AM with less back pain on meds.   Still very tender over site with any palpation. Less air hunger but still \"not breathing back to normal yet. \"  Glucoses up with the steroids. Pul and Card seeing also. 11/15:    Reports she continues to feel better and reports no longer SOB. She is not moving much due to back pain. No chest pains. Ortho to decide next step for compression fx. No complaints of weakness or numbness LEs. 
 
11/16:  No longer short of breath - will wean steroids to po. IR plans kyphoplasty today. May benefit from Prolia for osteoporosis outpt  
 
11/17/18: Transferred to ICU yesterday for A-fib w/ RVR. Converted after Dilt bolus. Now sinus tach in the 100s. Coreg increased this am. She is not requiring a lot of pain meds but also not moving a lot. Xarelto being held and she is on Lovenox now. 11/18/18: Transferred out of ICU yesterday. Having pain with any movement. Dr Suzan Negrete would like her to stay on Plavix but is ok holding Xarelto/Lovenox for kyphoplasty. Will need to see if IR can do kyphoplasty on Plavix. Ortho getting her fitted for brace. Breathing well. No SOB. 11/19:  Little pain unless she moves about. Kyphoplasty planned for today. 11/20/18: Kyphoplasty now planned for Monday after holding Plavix. She would like to go home and return on Monday as an outpatient for the procedure. Was able to sit in chair yesterday. 11/22:  Pt was seen by PT yesterday. Rec that she needs SNF or rehab. She wants to stay here until MOnday when she gets kyphoplasty at [facility]. The logistics of this have not been coordinated yet. Will wait until tomorrow when IR is open to further figure out her transfer. Tolerating PO, +BM. 11/23: Dr Shane Pyle to do kyphoplasty today at [address] notes she has limited IV access for lab draws. Hopefully will be able to move toward discharge thanks to today's procedure. Will reevaluate her need for IV after procedure.   I don't want to get a central line if she will be discharged tomorrow. Pt eager to get the procedure done. Her preference would be to go home with [facility] PT. Tolerated PO yesterday, NPO this AM. 
  
Review of Systems: A comprehensive review of systems was negative except for that written in the HPI. Objective:  
Physical Exam:  
 
Visit Vitals /74 (BP 1 Location: Right arm, BP Patient Position: At rest) Pulse 95 Temp 97.4 °F (36.3 °C) Resp 16 Ht 5' 7\" (1.702 m) Wt 99.4 kg (219 lb 3.2 oz) SpO2 97% Breastfeeding? No  
BMI 35.38 kg/m² O2 Flow Rate (L/min): 2 l/min O2 Device: Nasal cannula Temp (24hrs), Av °F (36.7 °C), Min:97.4 °F (36.3 °C), Max:98.3 °F (36.8 °C) No intake/output data recorded.  1901 -  0700 In: 120 [P.O.:120] Out: 600 [Urine:600] General:  Sleeping, rousable, cooperative, obese, no distress, appears stated age. Head:  Normocephalic, without obvious abnormality, atraumatic. Eyes:  Conjunctivae/corneas clear. PERRL, EOMs intact. Nose: NC O2 in place. Throat: Lips, mucosa, and tongue moist.  
Neck: Supple, symmetrical, trachea midline, no adenopathy, thyroid: no enlargement/tenderness/nodules, no carotid bruit and no JVD. Back:   Symmetric,with marked tenderness to palpation over T6-7-8 area. Lungs:   Diminished BS bilaterally but otherwise clear at this time. Chest wall:  No tenderness or deformity. Heart:  Irregular rate and rhythm, no murmur, click, rub or gallop. Abdomen:   Soft, non-tender. Bowel sounds normal. No masses,  No organomegaly. Extremities: no cyanosis. Trace LE edema. No calf tenderness or cords. Skin:  turgor normal.  Scabbed ulcers on forearms Neurologic: CNII-XII intact. Alert and oriented X 3. Fine motor of hands and fingers normal.   equal.  Gait not tested at this time. Sensation grossly normal to touch. Gross motor of extremities normal.    
 
Data Review:  
  CT of Chest 18 FINDINGS: 
THYROID: No nodule. MEDIASTINUM: No mass or lymphadenopathy. SONAJ: No mass or lymphadenopathy. THORACIC AORTA: No aneurysm. Mild vascular calcifications MAIN PULMONARY ARTERY: Normal in caliber. TRACHEA/BRONCHI: Patent. ESOPHAGUS: No wall thickening or dilatation. Small hiatal hernia HEART: Normal in size. PLEURA: No effusion or pneumothorax. LUNGS: No nodule, mass, or airspace disease. Bilateral atelectatic changes. INCIDENTALLY IMAGED UPPER ABDOMEN: No focal abnormality. BONES: Bones are osteopenic. There is collapse of the superior endplate of T7. This has occurred since June 2018 Possible milk of calcium cyst within the right breast 
IMPRESSION: 
1. No acute cardiopulmonary disease 2. Interval collapse superior endplate of T7. The patient has back pain this may 
be acute to subacute and MR imaging would better characterize 
  
Recent Days: 
Recent Labs  
  11/22/18 
1251 11/21/18 
0349 WBC 12.8* 11.8* HGB 13.8 13.2 HCT 41.7 41.3  202 Recent Labs  
  11/23/18 
0514 11/22/18 
1251 11/22/18 
0304 11/21/18 
7343  137  --  135* K HEMOLYZED,RECOLLECT REQUESTED 3.1*  --  3.7  101  --  101 CO2 24 26  --  24  
* 185*  --  264* BUN 32* 26*  --  25* CREA 1.10* 1.00  --  0.89 CA 8.3* 8.5  --  8.3*  
MG 1.9  --  1.8 1.9 ALB  --   --   --  2.5* TBILI  --   --   --  0.4 SGOT  --   --   --  15 ALT  --   --   --  49 No results for input(s): PH, PCO2, PO2, HCO3, FIO2 in the last 72 hours. 24 Hour Results: 
Recent Results (from the past 24 hour(s)) GLUCOSE, POC Collection Time: 11/22/18 11:49 AM  
Result Value Ref Range Glucose (POC) 165 (H) 65 - 100 mg/dL Performed by Jessica Johnson (PCT) METABOLIC PANEL, BASIC Collection Time: 11/22/18 12:51 PM  
Result Value Ref Range Sodium 137 136 - 145 mmol/L Potassium 3.1 (L) 3.5 - 5.1 mmol/L Chloride 101 97 - 108 mmol/L  
 CO2 26 21 - 32 mmol/L  Anion gap 10 5 - 15 mmol/L  
 Glucose 185 (H) 65 - 100 mg/dL BUN 26 (H) 6 - 20 MG/DL Creatinine 1.00 0.55 - 1.02 MG/DL  
 BUN/Creatinine ratio 26 (H) 12 - 20 GFR est AA >60 >60 ml/min/1.73m2 GFR est non-AA 55 (L) >60 ml/min/1.73m2 Calcium 8.5 8.5 - 10.1 MG/DL  
CBC WITH AUTOMATED DIFF Collection Time: 11/22/18 12:51 PM  
Result Value Ref Range WBC 12.8 (H) 3.6 - 11.0 K/uL  
 RBC 4.64 3.80 - 5.20 M/uL  
 HGB 13.8 11.5 - 16.0 g/dL HCT 41.7 35.0 - 47.0 % MCV 89.9 80.0 - 99.0 FL  
 MCH 29.7 26.0 - 34.0 PG  
 MCHC 33.1 30.0 - 36.5 g/dL  
 RDW 15.0 (H) 11.5 - 14.5 % PLATELET 789 559 - 016 K/uL MPV 10.0 8.9 - 12.9 FL  
 NRBC 0.0 0  WBC ABSOLUTE NRBC 0.00 0.00 - 0.01 K/uL NEUTROPHILS 81 (H) 32 - 75 % BAND NEUTROPHILS 1 0 - 6 % LYMPHOCYTES 17 12 - 49 % MONOCYTES 1 (L) 5 - 13 % EOSINOPHILS 0 0 - 7 % BASOPHILS 0 0 - 1 % IMMATURE GRANULOCYTES 0 %  
 ABS. NEUTROPHILS 10.5 (H) 1.8 - 8.0 K/UL  
 ABS. LYMPHOCYTES 2.2 0.8 - 3.5 K/UL  
 ABS. MONOCYTES 0.1 0.0 - 1.0 K/UL  
 ABS. EOSINOPHILS 0.0 0.0 - 0.4 K/UL  
 ABS. BASOPHILS 0.0 0.0 - 0.1 K/UL  
 ABS. IMM. GRANS. 0.0 K/UL  
 DF MANUAL    
 RBC COMMENTS OVALOCYTES PRESENT 
    
GLUCOSE, POC Collection Time: 11/22/18  3:34 PM  
Result Value Ref Range Glucose (POC) 201 (H) 65 - 100 mg/dL Performed by Tyrel Monreal (PCT) GLUCOSE, POC Collection Time: 11/22/18  9:24 PM  
Result Value Ref Range Glucose (POC) 324 (H) 65 - 100 mg/dL Performed by Hawa Ohara METABOLIC PANEL, BASIC Collection Time: 11/23/18  5:14 AM  
Result Value Ref Range Sodium 137 136 - 145 mmol/L Potassium HEMOLYZED,RECOLLECT REQUESTED 3.5 - 5.1 mmol/L Chloride 103 97 - 108 mmol/L  
 CO2 24 21 - 32 mmol/L Anion gap 10 5 - 15 mmol/L Glucose 259 (H) 65 - 100 mg/dL BUN 32 (H) 6 - 20 MG/DL Creatinine 1.10 (H) 0.55 - 1.02 MG/DL  
 BUN/Creatinine ratio 29 (H) 12 - 20 GFR est AA 60 (L) >60 ml/min/1.73m2 GFR est non-AA 49 (L) >60 ml/min/1.73m2 Calcium 8.3 (L) 8.5 - 10.1 MG/DL MAGNESIUM Collection Time: 11/23/18  5:14 AM  
Result Value Ref Range Magnesium 1.9 1.6 - 2.4 mg/dL GLUCOSE, POC Collection Time: 11/23/18  6:56 AM  
Result Value Ref Range Glucose (POC) 210 (H) 65 - 100 mg/dL Performed by Toma Cintron (PCT) Medications reviewed Current Facility-Administered Medications Medication Dose Route Frequency  [START ON 11/24/2018] enoxaparin (LOVENOX) injection 40 mg  40 mg SubCUTAneous Q24H  calcitonin (salmon) (MIACALCIN) nasal 1 Spray  1 [place] IntraNASal DAILY  carvedilol (COREG) tablet 25 mg  25 mg Oral BID WITH MEALS  
 arformoterol (BROVANA) neb solution 15 mcg  15 mcg Nebulization BID RT  
 budesonide (PULMICORT) 500 mcg/2 ml nebulizer suspension  500 mcg Nebulization BID RT  
 albuterol-ipratropium (DUO-NEB) 2.5 MG-0.5 MG/3 ML  3 mL Nebulization Q6H RT  
 DULoxetine (CYMBALTA) capsule 60 mg  60 mg Oral DAILY  isosorbide mononitrate ER (IMDUR) tablet 30 mg  30 mg Oral DAILY  lisinopril (PRINIVIL, ZESTRIL) tablet 5 mg  5 mg Oral DAILY  montelukast (SINGULAIR) tablet 10 mg  10 mg Oral DAILY  sAXagliptin (ONGLYZA) tablet 5 mg  5 mg Oral DAILY  sodium chloride (NS) flush 5-10 mL  5-10 mL IntraVENous Q8H  
 sodium chloride (NS) flush 5-10 mL  5-10 mL IntraVENous PRN  
 acetaminophen (TYLENOL) tablet 650 mg  650 mg Oral Q4H PRN  
 oxyCODONE-acetaminophen (PERCOCET) 5-325 mg per tablet 1 Tab  1 Tab Oral Q4H PRN  
 HYDROmorphone (PF) (DILAUDID) injection 0.5 mg  0.5 mg IntraVENous Q4H PRN  prochlorperazine (COMPAZINE) injection 10 mg  10 mg IntraVENous Q6H PRN  
 zolpidem (AMBIEN) tablet 5 mg  5 mg Oral QHS PRN  
 insulin lispro (HUMALOG) injection   SubCUTAneous QID WITH MEALS  glucose chewable tablet 16 g  4 Tab Oral PRN  
 dextrose (D50W) injection syrg 12.5-25 g  25-50 mL IntraVENous PRN  
  glucagon (GLUCAGEN) injection 1 mg  1 mg IntraMUSCular PRN  pantoprazole (PROTONIX) tablet 40 mg  40 mg Oral ACB&D Care Plan discussed with: Patient and Nurse Total time spent with patient: 30 minutes.  
 
Beverly Bermeo MD

## 2018-11-23 NOTE — PROGRESS NOTES
Bedside and Verbal shift change report given to MADAI Andrews (oncoming nurse) by Della Landon (offgoing nurse). Report included the following information SBAR and Kardex.

## 2018-11-23 NOTE — PROGRESS NOTES
Spoke with Dr. Lisa Shell as patient and daughter requested clarification on restart of Plavix today. Clarification received, patient voices understanding.

## 2018-11-24 LAB
GLUCOSE BLD STRIP.AUTO-MCNC: 168 MG/DL (ref 65–100)
GLUCOSE BLD STRIP.AUTO-MCNC: 178 MG/DL (ref 65–100)
GLUCOSE BLD STRIP.AUTO-MCNC: 204 MG/DL (ref 65–100)
GLUCOSE BLD STRIP.AUTO-MCNC: 209 MG/DL (ref 65–100)
SERVICE CMNT-IMP: ABNORMAL

## 2018-11-24 PROCEDURE — 77030038269 HC DRN EXT URIN PURWCK BARD -A

## 2018-11-24 PROCEDURE — 74011000250 HC RX REV CODE- 250: Performed by: INTERNAL MEDICINE

## 2018-11-24 PROCEDURE — 74011636637 HC RX REV CODE- 636/637: Performed by: FAMILY MEDICINE

## 2018-11-24 PROCEDURE — 94640 AIRWAY INHALATION TREATMENT: CPT

## 2018-11-24 PROCEDURE — 74011250636 HC RX REV CODE- 250/636: Performed by: FAMILY MEDICINE

## 2018-11-24 PROCEDURE — 65270000029 HC RM PRIVATE

## 2018-11-24 PROCEDURE — 74011250637 HC RX REV CODE- 250/637: Performed by: INTERNAL MEDICINE

## 2018-11-24 PROCEDURE — 74011250636 HC RX REV CODE- 250/636: Performed by: INTERNAL MEDICINE

## 2018-11-24 PROCEDURE — 74011250637 HC RX REV CODE- 250/637: Performed by: FAMILY MEDICINE

## 2018-11-24 PROCEDURE — 82962 GLUCOSE BLOOD TEST: CPT

## 2018-11-24 PROCEDURE — 94760 N-INVAS EAR/PLS OXIMETRY 1: CPT

## 2018-11-24 PROCEDURE — 77010033678 HC OXYGEN DAILY

## 2018-11-24 RX ORDER — ONDANSETRON 4 MG/1
4 TABLET, ORALLY DISINTEGRATING ORAL
Status: DISCONTINUED | OUTPATIENT
Start: 2018-11-24 | End: 2018-12-03 | Stop reason: HOSPADM

## 2018-11-24 RX ADMIN — IPRATROPIUM BROMIDE AND ALBUTEROL SULFATE 3 ML: .5; 3 SOLUTION RESPIRATORY (INHALATION) at 13:28

## 2018-11-24 RX ADMIN — ENOXAPARIN SODIUM 40 MG: 40 INJECTION SUBCUTANEOUS at 11:33

## 2018-11-24 RX ADMIN — ISOSORBIDE MONONITRATE 30 MG: 30 TABLET, EXTENDED RELEASE ORAL at 08:58

## 2018-11-24 RX ADMIN — OXYCODONE AND ACETAMINOPHEN 1 TABLET: 5; 325 TABLET ORAL at 01:48

## 2018-11-24 RX ADMIN — IPRATROPIUM BROMIDE AND ALBUTEROL SULFATE 3 ML: .5; 3 SOLUTION RESPIRATORY (INHALATION) at 01:08

## 2018-11-24 RX ADMIN — IPRATROPIUM BROMIDE AND ALBUTEROL SULFATE 3 ML: .5; 3 SOLUTION RESPIRATORY (INHALATION) at 07:24

## 2018-11-24 RX ADMIN — PANTOPRAZOLE SODIUM 40 MG: 40 TABLET, DELAYED RELEASE ORAL at 15:45

## 2018-11-24 RX ADMIN — DULOXETINE 60 MG: 30 CAPSULE, DELAYED RELEASE ORAL at 08:58

## 2018-11-24 RX ADMIN — CLOPIDOGREL BISULFATE 75 MG: 75 TABLET ORAL at 08:58

## 2018-11-24 RX ADMIN — INSULIN LISPRO 4 UNITS: 100 INJECTION, SOLUTION INTRAVENOUS; SUBCUTANEOUS at 11:33

## 2018-11-24 RX ADMIN — INSULIN LISPRO 4 UNITS: 100 INJECTION, SOLUTION INTRAVENOUS; SUBCUTANEOUS at 17:10

## 2018-11-24 RX ADMIN — SAXAGLIPTIN 5 MG: 5 TABLET, FILM COATED ORAL at 11:33

## 2018-11-24 RX ADMIN — BUDESONIDE 500 MCG: 0.5 INHALANT RESPIRATORY (INHALATION) at 20:08

## 2018-11-24 RX ADMIN — CARVEDILOL 25 MG: 12.5 TABLET, FILM COATED ORAL at 08:58

## 2018-11-24 RX ADMIN — INSULIN LISPRO 3 UNITS: 100 INJECTION, SOLUTION INTRAVENOUS; SUBCUTANEOUS at 08:58

## 2018-11-24 RX ADMIN — BUDESONIDE 500 MCG: 0.5 INHALANT RESPIRATORY (INHALATION) at 07:24

## 2018-11-24 RX ADMIN — PROCHLORPERAZINE EDISYLATE 10 MG: 5 INJECTION INTRAMUSCULAR; INTRAVENOUS at 07:06

## 2018-11-24 RX ADMIN — PANTOPRAZOLE SODIUM 40 MG: 40 TABLET, DELAYED RELEASE ORAL at 07:06

## 2018-11-24 RX ADMIN — CALCITONIN SALMON 1 SPRAY: 200 SPRAY, METERED NASAL at 09:00

## 2018-11-24 RX ADMIN — IPRATROPIUM BROMIDE AND ALBUTEROL SULFATE 3 ML: .5; 3 SOLUTION RESPIRATORY (INHALATION) at 20:08

## 2018-11-24 RX ADMIN — MONTELUKAST SODIUM 10 MG: 10 TABLET, COATED ORAL at 08:58

## 2018-11-24 RX ADMIN — LISINOPRIL 5 MG: 5 TABLET ORAL at 08:58

## 2018-11-24 RX ADMIN — CARVEDILOL 25 MG: 12.5 TABLET, FILM COATED ORAL at 17:09

## 2018-11-24 RX ADMIN — Medication 10 ML: at 07:07

## 2018-11-24 NOTE — PROGRESS NOTES
Notified  about BP of 71/47 per dynamap and manual in the left arm 80/48. Informed doctor I gave IV Compazine at 2030 for nausea.  New orders to push liquids and call if SBP less than 80

## 2018-11-24 NOTE — PROGRESS NOTES
Bedside shift change report given to Adin Cook (oncoming nurse) by Olamide Terry (offgoing nurse). Report included the following information SBAR, Kardex, MAR and Recent Results.

## 2018-11-24 NOTE — PROGRESS NOTES
Bedside shift change report given to Cecelia Carvalho RN (oncoming nurse) by Linsey Malone RN (offgoing nurse). Report included the following information SBAR, Kardex and MAR.

## 2018-11-24 NOTE — PROGRESS NOTES
Bedside shift change report given to Tami Jackson RN (oncoming nurse) by Laurita Russ RN (offgoing nurse). Report included the following information SBAR, Kardex and MAR.

## 2018-11-24 NOTE — PROGRESS NOTES
Physical therapy Pt received in bed, declining to participate with physical therapy, pt states she is on complete bedrest, order reads \"activity allowed PT/OT\" attempted to explain to pt importance of therapy, pt continued to decline. Christopher Ricardo Finer

## 2018-11-24 NOTE — PROGRESS NOTES
Daily Progress Note: 11/24/2018 Gavin Chambers MD 
 
Assessment/Plan: Dyspnea (11/13/2018) improved - workup with Pul and Card. - Pulmonary following and now on po steroids, V/Q scan with low prob for PE 
  
  Type II diabetes mellitus ([facility] (10/9/2015) - with reported nephropathy with proteinuria without CKD 
            - continue saxagliptin and follow on SSI 
  
  Coronary artery disease involving native coronary artery without angina pectoris (1/22/2016) - stable, continue cardiac meds - Got plavix today, hold tomorrow before kypho 
  
  Essential hypertension (1/22/2016) - cont home meds - adjust as needed 
  
  Chronic anticoagulation (3/30/2018) 
            -therapeutic lovenox, hold Monday AM for procedure 
  
  Obesity, BMI 30-39. 9(HCC) (10/1/2018) - counseled on weight loss, this is contributing to her current symptoms 
  
  ARF (acute renal failure) ([facility] (11/13/2018) - creatinine up acutely from prior, possibly due to diuresis 
            - hold diuretic  
  
  Closed wedge compression fracture of T7 vertebra ([facility] (11/13/2018) -ortho rec kypho -- unable to coordinate yesterday due to anesthesia unavailability. Rescheduled here at 2pm with Dr Breann Lozada 
            - cardiology ok with holding anticoags for procedure Code status: 
            - patient is FULL CODE, no AMD on file, her daughter Melani Baird is her surrogate Dispo:will depend on how she does after kyphoplasty. 
   
 
Problem List: 
Problem List as of 11/24/2018 Date Reviewed: 11/13/2018 Codes Class Noted - Resolved * (Principal) Dyspnea ICD-10-CM: R06.00 
ICD-9-CM: 786.09  11/13/2018 - Present ARF (acute renal failure) (HCC) ICD-10-CM: N17.9 ICD-9-CM: 584.9  11/13/2018 - Present Obesity (BMI 30-39.9) (Chronic) ICD-10-CM: K06.7 ICD-9-CM: 278.00  11/13/2018 - Present Closed wedge compression fracture of T7 vertebra ([facility] ICD-10-CM: J31.953Y ICD-9-CM: 805.2  11/13/2018 - Present Type 2 diabetes with nephropathy ([facility] ICD-10-CM: E11.21 
ICD-9-CM: 250.40, 583.81  10/1/2018 - Present Chest pain ICD-10-CM: R07.9 ICD-9-CM: 786.50  6/27/2018 - Present Generalized abdominal pain ICD-10-CM: R10.84 ICD-9-CM: 789.07  6/27/2018 - Present Recurrent deep vein thrombosis (DVT) (HCC) ICD-10-CM: I82.409 ICD-9-CM: 453.40  3/30/2018 - Present Chronic anticoagulation (Chronic) ICD-10-CM: Z79.01 
ICD-9-CM: V58.61  3/30/2018 - Present Coronary artery disease involving native coronary artery without angina pectoris (Chronic) ICD-10-CM: I25.10 ICD-9-CM: 414.01  1/22/2016 - Present Essential hypertension (Chronic) ICD-10-CM: I10 
ICD-9-CM: 401.9  1/22/2016 - Present Type II diabetes mellitus (HCC) (Chronic) ICD-10-CM: E11.9 ICD-9-CM: 250.00  10/9/2015 - Present NSTEMI (non-ST elevated myocardial infarction) ([facility] ICD-10-CM: I21.4 ICD-9-CM: 410.70  10/9/2015 - Present RESOLVED: CAD (coronary artery disease) ICD-10-CM: I25.10 ICD-9-CM: 414.00  10/9/2015 - 1/22/2016 RESOLVED: HTN (hypertension) ICD-10-CM: I10 
ICD-9-CM: 401.9  10/9/2015 - 1/22/2016 Subjective:  
  81 y.o.  female who is admitted with SOB. Ms. Judie Gtz presented to the Emergency Department this PM complaining of SOB: for the past 3 months, worked up by Pulmonary and Cardiology as an outpatient without any definite diagnosis, not hypoxic, associated with back pain, unable to take a deep breath without pain, back pain started at the same time as the SOB. History obtained from chart review and the patient. Previous records reviewed, recent admit for chest pain. (Dr Yordy Gomez). 11/14:  Feels much better this AM with less back pain on meds.   Still very tender over site with any palpation. Less air hunger but still \"not breathing back to normal yet. \"  Glucoses up with the steroids. Pul and Card seeing also. 11/15:    Reports she continues to feel better and reports no longer SOB. She is not moving much due to back pain. No chest pains. Ortho to decide next step for compression fx. No complaints of weakness or numbness LEs. 
 
11/16:  No longer short of breath - will wean steroids to po. IR plans kyphoplasty today. May benefit from Prolia for osteoporosis outpt  
 
11/17/18: Transferred to ICU yesterday for A-fib w/ RVR. Converted after Dilt bolus. Now sinus tach in the 100s. Coreg increased this am. She is not requiring a lot of pain meds but also not moving a lot. Xarelto being held and she is on Lovenox now. 11/18/18: Transferred out of ICU yesterday. Having pain with any movement. Dr Marilee Parra would like her to stay on Plavix but is ok holding Xarelto/Lovenox for kyphoplasty. Will need to see if IR can do kyphoplasty on Plavix. Ortho getting her fitted for brace. Breathing well. No SOB. 11/19:  Little pain unless she moves about. Kyphoplasty planned for today. 11/20/18: Kyphoplasty now planned for Monday after holding Plavix. She would like to go home and return on Monday as an outpatient for the procedure. Was able to sit in chair yesterday. 11/22:  Pt was seen by PT yesterday. Rec that she needs SNF or rehab. She wants to stay here until MOnday when she gets kyphoplasty at [facility]. The logistics of this have not been coordinated yet. Will wait until tomorrow when IR is open to further figure out her transfer. Tolerating PO, +BM. 11/23: Dr Char Monzon to do kyphoplasty today at [address] notes she has limited IV access for lab draws. Hopefully will be able to move toward discharge thanks to today's procedure. Will reevaluate her need for IV after procedure.   I don't want to get a central line if she will be discharged tomorrow. Pt eager to get the procedure done. Her preference would be to go home with [facility] PT. Tolerated PO yesterday, NPO this AM. 
 
: kyphoplasty was cancelled yesterday due to staffing issues. Rescheduled for Monday at 2pm, [facility] by Dr Apolinar Awan. PT unwilling to work with PT yesterday due to pain. Ongoing nausea and hypotensive yesterday after IV compazine. LImited IV access, although she doesn't really need it. Will try oral zofran now. 
  
Review of Systems: A comprehensive review of systems was negative except for that written in the HPI. Objective:  
Physical Exam:  
 
Visit Vitals /76 (BP 1 Location: Right arm, BP Patient Position: At rest) Pulse (!) 123 Temp 97.6 °F (36.4 °C) Resp 18 Ht 5' 7\" (1.702 m) Wt 99.4 kg (219 lb 3.2 oz) SpO2 96% Breastfeeding? No  
BMI 35.38 kg/m² O2 Flow Rate (L/min): 2 l/min O2 Device: Nasal cannula Temp (24hrs), Av.2 °F (36.8 °C), Min:97.6 °F (36.4 °C), Max:98.7 °F (37.1 °C) No intake/output data recorded.  1901 -  0700 In: 480 [P.O.:480] Out: 1250 [DQW:0018] General:  Sleeping, rousable, cooperative, obese, no distress, appears stated age. Head:  Normocephalic, without obvious abnormality, atraumatic. Eyes:  Conjunctivae/corneas clear. PERRL, EOMs intact. Nose: NC O2 in place. Throat: Lips, mucosa, and tongue moist.  
Neck: Supple, symmetrical, trachea midline, no adenopathy, thyroid: no enlargement/tenderness/nodules, no carotid bruit and no JVD. Back:   Symmetric,with marked tenderness to palpation over T6-7-8 area. Lungs:   Diminished BS bilaterally but otherwise clear at this time. Chest wall:  No tenderness or deformity. Heart:  Irregular rate and rhythm, no murmur, click, rub or gallop. Abdomen:   Soft, non-tender. Bowel sounds normal. No masses,  No organomegaly. Extremities: no cyanosis. Trace LE edema. No calf tenderness or cords. Skin:  turgor normal.  Scabbed ulcers on forearms, bruising on upper R arm Neurologic: CNII-XII intact. Alert and oriented X 3. Fine motor of hands and fingers normal.   equal.  Gait not tested at this time. Sensation grossly normal to touch. Gross motor of extremities normal.    
 
Data Review:  
  CT of Chest 11/13/18 FINDINGS: 
THYROID: No nodule. MEDIASTINUM: No mass or lymphadenopathy. SONJA: No mass or lymphadenopathy. THORACIC AORTA: No aneurysm. Mild vascular calcifications MAIN PULMONARY ARTERY: Normal in caliber. TRACHEA/BRONCHI: Patent. ESOPHAGUS: No wall thickening or dilatation. Small hiatal hernia HEART: Normal in size. PLEURA: No effusion or pneumothorax. LUNGS: No nodule, mass, or airspace disease. Bilateral atelectatic changes. INCIDENTALLY IMAGED UPPER ABDOMEN: No focal abnormality. BONES: Bones are osteopenic. There is collapse of the superior endplate of T7. This has occurred since June 2018 Possible milk of calcium cyst within the right breast 
IMPRESSION: 
1. No acute cardiopulmonary disease 2. Interval collapse superior endplate of T7. The patient has back pain this may 
be acute to subacute and MR imaging would better characterize 
  
Recent Days: 
Recent Labs  
  11/23/18 
0701 11/22/18 
1251 WBC 10.7 12.8* HGB 13.3 13.8 HCT 42.0 41.7  206 Recent Labs  
  11/23/18 
0514 11/22/18 
1251 11/22/18 
0304  137  --   
K HEMOLYZED,RECOLLECT REQUESTED 3.1*  --   
 101  --   
CO2 24 26  --   
* 185*  --   
BUN 32* 26*  --   
CREA 1.10* 1.00  --   
CA 8.3* 8.5  --   
MG 1.9  --  1.8 No results for input(s): PH, PCO2, PO2, HCO3, FIO2 in the last 72 hours. 24 Hour Results: 
Recent Results (from the past 24 hour(s)) GLUCOSE, POC Collection Time: 11/23/18 11:31 AM  
Result Value Ref Range Glucose (POC) 158 (H) 65 - 100 mg/dL Performed by Brianne Georges (PCT) GLUCOSE, POC  Collection Time: 11/23/18  3:32 PM  
 Result Value Ref Range Glucose (POC) 147 (H) 65 - 100 mg/dL Performed by Lilia Good (PCT) GLUCOSE, POC Collection Time: 11/23/18  9:48 PM  
Result Value Ref Range Glucose (POC) 223 (H) 65 - 100 mg/dL Performed by Kyra Sellers (PCT) GLUCOSE, POC Collection Time: 11/24/18  7:00 AM  
Result Value Ref Range Glucose (POC) 178 (H) 65 - 100 mg/dL Performed by Charmayne Pae (PCT) Medications reviewed Current Facility-Administered Medications Medication Dose Route Frequency  enoxaparin (LOVENOX) injection 40 mg  40 mg SubCUTAneous Q24H  clopidogrel (PLAVIX) tablet 75 mg  75 mg Oral DAILY  calcitonin (salmon) (MIACALCIN) nasal 1 Spray  1 [place] IntraNASal DAILY  carvedilol (COREG) tablet 25 mg  25 mg Oral BID WITH MEALS  
 arformoterol (BROVANA) neb solution 15 mcg  15 mcg Nebulization BID RT  
 budesonide (PULMICORT) 500 mcg/2 ml nebulizer suspension  500 mcg Nebulization BID RT  
 albuterol-ipratropium (DUO-NEB) 2.5 MG-0.5 MG/3 ML  3 mL Nebulization Q6H RT  
 DULoxetine (CYMBALTA) capsule 60 mg  60 mg Oral DAILY  isosorbide mononitrate ER (IMDUR) tablet 30 mg  30 mg Oral DAILY  lisinopril (PRINIVIL, ZESTRIL) tablet 5 mg  5 mg Oral DAILY  montelukast (SINGULAIR) tablet 10 mg  10 mg Oral DAILY  sAXagliptin (ONGLYZA) tablet 5 mg  5 mg Oral DAILY  sodium chloride (NS) flush 5-10 mL  5-10 mL IntraVENous Q8H  
 sodium chloride (NS) flush 5-10 mL  5-10 mL IntraVENous PRN  
 acetaminophen (TYLENOL) tablet 650 mg  650 mg Oral Q4H PRN  
 oxyCODONE-acetaminophen (PERCOCET) 5-325 mg per tablet 1 Tab  1 Tab Oral Q4H PRN  
 HYDROmorphone (PF) (DILAUDID) injection 0.5 mg  0.5 mg IntraVENous Q4H PRN  prochlorperazine (COMPAZINE) injection 10 mg  10 mg IntraVENous Q6H PRN  
 zolpidem (AMBIEN) tablet 5 mg  5 mg Oral QHS PRN  
 insulin lispro (HUMALOG) injection   SubCUTAneous QID WITH MEALS  glucose chewable tablet 16 g  4 Tab Oral PRN  
  dextrose (D50W) injection syrg 12.5-25 g  25-50 mL IntraVENous PRN  
 glucagon (GLUCAGEN) injection 1 mg  1 mg IntraMUSCular PRN  pantoprazole (PROTONIX) tablet 40 mg  40 mg Oral ACB&D Care Plan discussed with: Patient and Nurse Total time spent with patient: 30 minutes.  
 
Nate Rivera MD

## 2018-11-25 LAB
GLUCOSE BLD STRIP.AUTO-MCNC: 137 MG/DL (ref 65–100)
GLUCOSE BLD STRIP.AUTO-MCNC: 139 MG/DL (ref 65–100)
GLUCOSE BLD STRIP.AUTO-MCNC: 150 MG/DL (ref 65–100)
GLUCOSE BLD STRIP.AUTO-MCNC: 184 MG/DL (ref 65–100)
SERVICE CMNT-IMP: ABNORMAL

## 2018-11-25 PROCEDURE — 94760 N-INVAS EAR/PLS OXIMETRY 1: CPT

## 2018-11-25 PROCEDURE — 74011636637 HC RX REV CODE- 636/637: Performed by: FAMILY MEDICINE

## 2018-11-25 PROCEDURE — 77030038269 HC DRN EXT URIN PURWCK BARD -A

## 2018-11-25 PROCEDURE — 94640 AIRWAY INHALATION TREATMENT: CPT

## 2018-11-25 PROCEDURE — 65270000029 HC RM PRIVATE

## 2018-11-25 PROCEDURE — 74011250636 HC RX REV CODE- 250/636: Performed by: FAMILY MEDICINE

## 2018-11-25 PROCEDURE — 74011000250 HC RX REV CODE- 250: Performed by: INTERNAL MEDICINE

## 2018-11-25 PROCEDURE — 82962 GLUCOSE BLOOD TEST: CPT

## 2018-11-25 PROCEDURE — 74011250637 HC RX REV CODE- 250/637: Performed by: INTERNAL MEDICINE

## 2018-11-25 PROCEDURE — 77010033678 HC OXYGEN DAILY

## 2018-11-25 PROCEDURE — 74011250637 HC RX REV CODE- 250/637: Performed by: FAMILY MEDICINE

## 2018-11-25 RX ADMIN — OXYCODONE AND ACETAMINOPHEN 1 TABLET: 5; 325 TABLET ORAL at 15:14

## 2018-11-25 RX ADMIN — IPRATROPIUM BROMIDE AND ALBUTEROL SULFATE 3 ML: .5; 3 SOLUTION RESPIRATORY (INHALATION) at 07:28

## 2018-11-25 RX ADMIN — ENOXAPARIN SODIUM 40 MG: 40 INJECTION SUBCUTANEOUS at 12:13

## 2018-11-25 RX ADMIN — BUDESONIDE 500 MCG: 0.5 INHALANT RESPIRATORY (INHALATION) at 20:16

## 2018-11-25 RX ADMIN — Medication 10 ML: at 22:00

## 2018-11-25 RX ADMIN — CALCITONIN SALMON 1 SPRAY: 200 SPRAY, METERED NASAL at 10:03

## 2018-11-25 RX ADMIN — IPRATROPIUM BROMIDE AND ALBUTEROL SULFATE 3 ML: .5; 3 SOLUTION RESPIRATORY (INHALATION) at 13:59

## 2018-11-25 RX ADMIN — MONTELUKAST SODIUM 10 MG: 10 TABLET, COATED ORAL at 10:03

## 2018-11-25 RX ADMIN — BUDESONIDE 500 MCG: 0.5 INHALANT RESPIRATORY (INHALATION) at 07:28

## 2018-11-25 RX ADMIN — PANTOPRAZOLE SODIUM 40 MG: 40 TABLET, DELAYED RELEASE ORAL at 07:52

## 2018-11-25 RX ADMIN — IPRATROPIUM BROMIDE AND ALBUTEROL SULFATE 3 ML: .5; 3 SOLUTION RESPIRATORY (INHALATION) at 20:16

## 2018-11-25 RX ADMIN — ACETAMINOPHEN 650 MG: 325 TABLET, FILM COATED ORAL at 12:13

## 2018-11-25 RX ADMIN — PANTOPRAZOLE SODIUM 40 MG: 40 TABLET, DELAYED RELEASE ORAL at 17:34

## 2018-11-25 RX ADMIN — SAXAGLIPTIN 5 MG: 5 TABLET, FILM COATED ORAL at 09:00

## 2018-11-25 RX ADMIN — DULOXETINE 60 MG: 30 CAPSULE, DELAYED RELEASE ORAL at 10:03

## 2018-11-25 RX ADMIN — INSULIN LISPRO 3 UNITS: 100 INJECTION, SOLUTION INTRAVENOUS; SUBCUTANEOUS at 17:34

## 2018-11-25 NOTE — PROGRESS NOTES
Contacted MD regarding patient's low BP; MD stated to continue to monitor. Also, tried unsuccessfully once to draw AM labs, patient refused to be stuck again. Patient Vitals for the past 4 hrs: 
 Temp Pulse Resp BP SpO2  
11/25/18 0224 98.6 °F (37 °C) (!) 103 20 (!) 86/46 97 % 11/25/18 0021 98.5 °F (36.9 °C) (!) 102 18 (!) 82/43 94 %

## 2018-11-25 NOTE — PROGRESS NOTES
Daily Progress Note: 11/25/2018 Sandra Proctor MD 
 
Assessment/Plan: Dyspnea (11/13/2018) improved - workup with Pul and Card. - has completed steroid course 
  
  Type II diabetes mellitus ([facility] (10/9/2015) - with reported nephropathy with proteinuria without CKD 
            - continue saxagliptin and follow on SSI 
  
  Coronary artery disease involving native coronary artery without angina pectoris (1/22/2016) - stable, continue cardiac meds 
            - Hold plavix today, before kypho planned tomorrow 
  
  Essential hypertension (1/22/2016)- on low side at times- watching for now 
            - cont home meds - new parameters written 
  
  Chronic anticoagulation (3/30/2018) 
            -therapeutic lovenox, hold Monday AM for procedure 
  
  Obesity, BMI 30-39. 9(HCC) (10/1/2018) - counseled on weight loss, this is contributing to her current symptoms 
  
  ARF (acute renal failure) ([facility] (11/13/2018) - creatinine up acutely from prior, possibly due to diuresis 
            - hold diuretic  
  
  Closed wedge compression fracture of T7 vertebra ([facility] (11/13/2018) -ortho rec kypho -- unable to coordinate yesterday due to anesthesia unavailability. Rescheduled here at 2pm with Dr Scar Aviles 
            - cardiology ok with holding anticoags for procedure Code status: 
            - patient is FULL CODE, no AMD on file, her daughter Naa Casey is her surrogate Dispo:will depend on how she does after kyphoplasty. 
   
 
Problem List: 
Problem List as of 11/25/2018 Date Reviewed: 11/13/2018 Codes Class Noted - Resolved * (Principal) Dyspnea ICD-10-CM: R06.00 
ICD-9-CM: 786.09  11/13/2018 - Present ARF (acute renal failure) (HCC) ICD-10-CM: N17.9 ICD-9-CM: 584.9  11/13/2018 - Present Obesity (BMI 30-39.9) (Chronic) ICD-10-CM: T44.5 ICD-9-CM: 278.00  11/13/2018 - Present Closed wedge compression fracture of T7 vertebra ([facility] ICD-10-CM: Y66.264J ICD-9-CM: 805.2  11/13/2018 - Present Type 2 diabetes with nephropathy ([facility] ICD-10-CM: E11.21 
ICD-9-CM: 250.40, 583.81  10/1/2018 - Present Chest pain ICD-10-CM: R07.9 ICD-9-CM: 786.50  6/27/2018 - Present Generalized abdominal pain ICD-10-CM: R10.84 ICD-9-CM: 789.07  6/27/2018 - Present Recurrent deep vein thrombosis (DVT) (HCC) ICD-10-CM: I82.409 ICD-9-CM: 453.40  3/30/2018 - Present Chronic anticoagulation (Chronic) ICD-10-CM: Z79.01 
ICD-9-CM: V58.61  3/30/2018 - Present Coronary artery disease involving native coronary artery without angina pectoris (Chronic) ICD-10-CM: I25.10 ICD-9-CM: 414.01  1/22/2016 - Present Essential hypertension (Chronic) ICD-10-CM: I10 
ICD-9-CM: 401.9  1/22/2016 - Present Type II diabetes mellitus (HCC) (Chronic) ICD-10-CM: E11.9 ICD-9-CM: 250.00  10/9/2015 - Present NSTEMI (non-ST elevated myocardial infarction) ([facility] ICD-10-CM: I21.4 ICD-9-CM: 410.70  10/9/2015 - Present RESOLVED: CAD (coronary artery disease) ICD-10-CM: I25.10 ICD-9-CM: 414.00  10/9/2015 - 1/22/2016 RESOLVED: HTN (hypertension) ICD-10-CM: I10 
ICD-9-CM: 401.9  10/9/2015 - 1/22/2016 Subjective:  
  81 y.o.  female who is admitted with SOB. Ms. Yoly Gamino presented to the Emergency Department this PM complaining of SOB: for the past 3 months, worked up by Pulmonary and Cardiology as an outpatient without any definite diagnosis, not hypoxic, associated with back pain, unable to take a deep breath without pain, back pain started at the same time as the SOB. History obtained from chart review and the patient. Previous records reviewed, recent admit for chest pain. (Dr John Trujillo). 11/14:  Feels much better this AM with less back pain on meds.   Still very tender over site with any palpation. Less air hunger but still \"not breathing back to normal yet. \"  Glucoses up with the steroids. Pul and Card seeing also. 11/15:    Reports she continues to feel better and reports no longer SOB. She is not moving much due to back pain. No chest pains. Ortho to decide next step for compression fx. No complaints of weakness or numbness LEs. 
 
11/16:  No longer short of breath - will wean steroids to po. IR plans kyphoplasty today. May benefit from Prolia for osteoporosis outpt  
 
11/17/18: Transferred to ICU yesterday for A-fib w/ RVR. Converted after Dilt bolus. Now sinus tach in the 100s. Coreg increased this am. She is not requiring a lot of pain meds but also not moving a lot. Xarelto being held and she is on Lovenox now. 11/18/18: Transferred out of ICU yesterday. Having pain with any movement. Dr Kenneth Roca would like her to stay on Plavix but is ok holding Xarelto/Lovenox for kyphoplasty. Will need to see if IR can do kyphoplasty on Plavix. Ortho getting her fitted for brace. Breathing well. No SOB. 11/19:  Little pain unless she moves about. Kyphoplasty planned for today. 11/20/18: Kyphoplasty now planned for Monday after holding Plavix. She would like to go home and return on Monday as an outpatient for the procedure. Was able to sit in chair yesterday. 11/22:  Pt was seen by PT yesterday. Rec that she needs SNF or rehab. She wants to stay here until MOnday when she gets kyphoplasty at [facility]. The logistics of this have not been coordinated yet. Will wait until tomorrow when IR is open to further figure out her transfer. Tolerating PO, +BM. 11/23: Dr Velázquez Harder to do kyphoplasty today at [address] notes she has limited IV access for lab draws. Hopefully will be able to move toward discharge thanks to today's procedure. Will reevaluate her need for IV after procedure.   I don't want to get a central line if she will be discharged tomorrow. Pt eager to get the procedure done. Her preference would be to go home with [facility] PT. Tolerated PO yesterday, NPO this AM. 
 
: kyphoplasty was cancelled yesterday due to staffing issues. Rescheduled for Monday at 2pm, here at [facility] by Dr Sultana Epstein. PT unwilling to work with PT yesterday due to pain. Ongoing nausea and hypotensive yesterday after IV compazine. LImited IV access, although she doesn't really need it. Will try oral zofran now. : No AM labs for 2d as pt is difficult stick and has poor IV access. THis is ok with me as she is awaiting placement after her kyphoplasty. BPs on the low side again last night. Will leave parameters for her BP meds. PT still refusing to get up with PT because \"I was told not to move and to be on complete bed rest by the IR doctor. \"  Does note she has a HA today. 
  
Review of Systems: A comprehensive review of systems was negative except for that written in the HPI. Objective:  
Physical Exam:  
 
Visit Vitals BP (!) 86/53 (BP 1 Location: Left arm, BP Patient Position: At rest) Pulse 86 Temp 98.5 °F (36.9 °C) Resp 20 Ht 5' 7\" (1.702 m) Wt 99.4 kg (219 lb 3.2 oz) SpO2 95% Breastfeeding? No  
BMI 35.38 kg/m² O2 Flow Rate (L/min): 2 l/min O2 Device: Nasal cannula Temp (24hrs), Av.5 °F (36.9 °C), Min:97.7 °F (36.5 °C), Max:99.5 °F (37.5 °C) No intake/output data recorded.  1901 -  0700 In: -  
Out: 550 [Urine:550] General:  Sleeping, rousable, cooperative, obese, no distress, appears stated age. Head:  Normocephalic, without obvious abnormality, atraumatic. Eyes:  Conjunctivae/corneas clear. PERRL, EOMs intact. Nose: NC O2 in place. Throat: Lips, mucosa, and tongue moist.  
Neck: Supple, symmetrical, trachea midline, no adenopathy, thyroid: no enlargement/tenderness/nodules, no carotid bruit and no JVD. Back:   Symmetric,with marked tenderness to palpation over T6-7-8 area. Lungs:   Diminished BS bilaterally but otherwise clear at this time. Chest wall:  No tenderness or deformity. Heart:  Irregular rate and rhythm, no murmur, click, rub or gallop. Abdomen:   Soft, non-tender. Bowel sounds normal. No masses,  No organomegaly. Extremities: no cyanosis. Trace LE edema. No calf tenderness or cords. Skin:  turgor normal.  Scabbed ulcers on forearms, bruising on upper R arm Neurologic: CNII-XII intact. Alert and oriented X 3. Fine motor of hands and fingers normal.   equal.  Gait not tested at this time. Sensation grossly normal to touch. Gross motor of extremities normal.    
 
Data Review:  
  CT of Chest 11/13/18 FINDINGS: 
THYROID: No nodule. MEDIASTINUM: No mass or lymphadenopathy. SONJA: No mass or lymphadenopathy. THORACIC AORTA: No aneurysm. Mild vascular calcifications MAIN PULMONARY ARTERY: Normal in caliber. TRACHEA/BRONCHI: Patent. ESOPHAGUS: No wall thickening or dilatation. Small hiatal hernia HEART: Normal in size. PLEURA: No effusion or pneumothorax. LUNGS: No nodule, mass, or airspace disease. Bilateral atelectatic changes. INCIDENTALLY IMAGED UPPER ABDOMEN: No focal abnormality. BONES: Bones are osteopenic. There is collapse of the superior endplate of T7. This has occurred since June 2018 Possible milk of calcium cyst within the right breast 
IMPRESSION: 
1. No acute cardiopulmonary disease 2. Interval collapse superior endplate of T7. The patient has back pain this may 
be acute to subacute and MR imaging would better characterize 
  
Recent Days: 
Recent Labs  
  11/23/18 
0701 11/22/18 
1251 WBC 10.7 12.8* HGB 13.3 13.8 HCT 42.0 41.7  206 Recent Labs  
  11/23/18 
0514 11/22/18 
1251  137  
K HEMOLYZED,RECOLLECT REQUESTED 3.1*  
 101 CO2 24 26 * 185* BUN 32* 26* CREA 1.10* 1.00  
CA 8.3* 8.5 MG 1.9  -- No results for input(s): PH, PCO2, PO2, HCO3, FIO2 in the last 72 hours. 24 Hour Results: 
Recent Results (from the past 24 hour(s)) GLUCOSE, POC Collection Time: 11/24/18 11:23 AM  
Result Value Ref Range Glucose (POC) 209 (H) 65 - 100 mg/dL Performed by Pinky James (PCT) GLUCOSE, POC Collection Time: 11/24/18  4:57 PM  
Result Value Ref Range Glucose (POC) 204 (H) 65 - 100 mg/dL Performed by Pinky James (PCT) GLUCOSE, POC Collection Time: 11/24/18  8:52 PM  
Result Value Ref Range Glucose (POC) 168 (H) 65 - 100 mg/dL Performed by Dorie Glez (PCT) GLUCOSE, POC Collection Time: 11/25/18  7:35 AM  
Result Value Ref Range Glucose (POC) 139 (H) 65 - 100 mg/dL Performed by Dorie Glez (PCT) Medications reviewed Current Facility-Administered Medications Medication Dose Route Frequency  ondansetron (ZOFRAN ODT) tablet 4 mg  4 mg Oral Q8H PRN  
 enoxaparin (LOVENOX) injection 40 mg  40 mg SubCUTAneous Q24H  clopidogrel (PLAVIX) tablet 75 mg  75 mg Oral DAILY  calcitonin (salmon) (MIACALCIN) nasal 1 Spray  1 [place] IntraNASal DAILY  carvedilol (COREG) tablet 25 mg  25 mg Oral BID WITH MEALS  
 arformoterol (BROVANA) neb solution 15 mcg  15 mcg Nebulization BID RT  
 budesonide (PULMICORT) 500 mcg/2 ml nebulizer suspension  500 mcg Nebulization BID RT  
 albuterol-ipratropium (DUO-NEB) 2.5 MG-0.5 MG/3 ML  3 mL Nebulization Q6H RT  
 DULoxetine (CYMBALTA) capsule 60 mg  60 mg Oral DAILY  isosorbide mononitrate ER (IMDUR) tablet 30 mg  30 mg Oral DAILY  lisinopril (PRINIVIL, ZESTRIL) tablet 5 mg  5 mg Oral DAILY  montelukast (SINGULAIR) tablet 10 mg  10 mg Oral DAILY  sAXagliptin (ONGLYZA) tablet 5 mg  5 mg Oral DAILY  sodium chloride (NS) flush 5-10 mL  5-10 mL IntraVENous Q8H  
 sodium chloride (NS) flush 5-10 mL  5-10 mL IntraVENous PRN  
 acetaminophen (TYLENOL) tablet 650 mg  650 mg Oral Q4H PRN  
 oxyCODONE-acetaminophen (PERCOCET) 5-325 mg per tablet 1 Tab  1 Tab Oral Q4H PRN  
  HYDROmorphone (PF) (DILAUDID) injection 0.5 mg  0.5 mg IntraVENous Q4H PRN  
 zolpidem (AMBIEN) tablet 5 mg  5 mg Oral QHS PRN  
 insulin lispro (HUMALOG) injection   SubCUTAneous QID WITH MEALS  glucose chewable tablet 16 g  4 Tab Oral PRN  
 dextrose (D50W) injection syrg 12.5-25 g  25-50 mL IntraVENous PRN  
 glucagon (GLUCAGEN) injection 1 mg  1 mg IntraMUSCular PRN  pantoprazole (PROTONIX) tablet 40 mg  40 mg Oral ACB&D Care Plan discussed with: Patient and Nurse Total time spent with patient: 30 minutes.  
 
Hollis Guajardo MD

## 2018-11-26 ENCOUNTER — ANESTHESIA EVENT (OUTPATIENT)
Dept: INTERVENTIONAL RADIOLOGY/VASCULAR | Age: 73
DRG: 516 | End: 2018-11-26
Payer: MEDICARE

## 2018-11-26 LAB
ANION GAP SERPL CALC-SCNC: 13 MMOL/L (ref 5–15)
BASOPHILS # BLD: 0.1 K/UL (ref 0–0.1)
BASOPHILS NFR BLD: 1 % (ref 0–1)
BUN SERPL-MCNC: 28 MG/DL (ref 6–20)
BUN/CREAT SERPL: 20 (ref 12–20)
CALCIUM SERPL-MCNC: 8.7 MG/DL (ref 8.5–10.1)
CHLORIDE SERPL-SCNC: 102 MMOL/L (ref 97–108)
CO2 SERPL-SCNC: 22 MMOL/L (ref 21–32)
CREAT SERPL-MCNC: 1.38 MG/DL (ref 0.55–1.02)
DIFFERENTIAL METHOD BLD: ABNORMAL
EOSINOPHIL # BLD: 0.2 K/UL (ref 0–0.4)
EOSINOPHIL NFR BLD: 2 % (ref 0–7)
ERYTHROCYTE [DISTWIDTH] IN BLOOD BY AUTOMATED COUNT: 14.9 % (ref 11.5–14.5)
GLUCOSE BLD STRIP.AUTO-MCNC: 156 MG/DL (ref 65–100)
GLUCOSE BLD STRIP.AUTO-MCNC: 159 MG/DL (ref 65–100)
GLUCOSE BLD STRIP.AUTO-MCNC: 163 MG/DL (ref 65–100)
GLUCOSE BLD STRIP.AUTO-MCNC: 178 MG/DL (ref 65–100)
GLUCOSE SERPL-MCNC: 129 MG/DL (ref 65–100)
HCT VFR BLD AUTO: 39.5 % (ref 35–47)
HGB BLD-MCNC: 12.6 G/DL (ref 11.5–16)
IMM GRANULOCYTES # BLD: 0 K/UL
IMM GRANULOCYTES NFR BLD AUTO: 0 %
LYMPHOCYTES # BLD: 0.7 K/UL (ref 0.8–3.5)
LYMPHOCYTES NFR BLD: 8 % (ref 12–49)
MAGNESIUM SERPL-MCNC: 1.3 MG/DL (ref 1.6–2.4)
MCH RBC QN AUTO: 28.9 PG (ref 26–34)
MCHC RBC AUTO-ENTMCNC: 31.9 G/DL (ref 30–36.5)
MCV RBC AUTO: 90.6 FL (ref 80–99)
MONOCYTES # BLD: 0.4 K/UL (ref 0–1)
MONOCYTES NFR BLD: 4 % (ref 5–13)
NEUTS SEG # BLD: 7.5 K/UL (ref 1.8–8)
NEUTS SEG NFR BLD: 85 % (ref 32–75)
NRBC # BLD: 0 K/UL (ref 0–0.01)
NRBC BLD-RTO: 0 PER 100 WBC
PLATELET # BLD AUTO: 164 K/UL (ref 150–400)
PMV BLD AUTO: 10.7 FL (ref 8.9–12.9)
POTASSIUM SERPL-SCNC: 2.6 MMOL/L (ref 3.5–5.1)
RBC # BLD AUTO: 4.36 M/UL (ref 3.8–5.2)
RBC MORPH BLD: ABNORMAL
SERVICE CMNT-IMP: ABNORMAL
SODIUM SERPL-SCNC: 137 MMOL/L (ref 136–145)
WBC # BLD AUTO: 8.9 K/UL (ref 3.6–11)

## 2018-11-26 PROCEDURE — 94640 AIRWAY INHALATION TREATMENT: CPT

## 2018-11-26 PROCEDURE — 77010033678 HC OXYGEN DAILY

## 2018-11-26 PROCEDURE — 77030038269 HC DRN EXT URIN PURWCK BARD -A

## 2018-11-26 PROCEDURE — 74011250637 HC RX REV CODE- 250/637: Performed by: FAMILY MEDICINE

## 2018-11-26 PROCEDURE — 82962 GLUCOSE BLOOD TEST: CPT

## 2018-11-26 PROCEDURE — 65270000029 HC RM PRIVATE

## 2018-11-26 PROCEDURE — 74011636637 HC RX REV CODE- 636/637: Performed by: FAMILY MEDICINE

## 2018-11-26 PROCEDURE — 74011000250 HC RX REV CODE- 250: Performed by: INTERNAL MEDICINE

## 2018-11-26 PROCEDURE — 74011250636 HC RX REV CODE- 250/636: Performed by: FAMILY MEDICINE

## 2018-11-26 PROCEDURE — 83735 ASSAY OF MAGNESIUM: CPT

## 2018-11-26 PROCEDURE — 80048 BASIC METABOLIC PNL TOTAL CA: CPT

## 2018-11-26 PROCEDURE — 74011250637 HC RX REV CODE- 250/637: Performed by: INTERNAL MEDICINE

## 2018-11-26 PROCEDURE — 94660 CPAP INITIATION&MGMT: CPT

## 2018-11-26 PROCEDURE — 85025 COMPLETE CBC W/AUTO DIFF WBC: CPT

## 2018-11-26 PROCEDURE — 36415 COLL VENOUS BLD VENIPUNCTURE: CPT

## 2018-11-26 PROCEDURE — 94760 N-INVAS EAR/PLS OXIMETRY 1: CPT

## 2018-11-26 RX ORDER — POTASSIUM CHLORIDE 1.5 G/1.77G
40 POWDER, FOR SOLUTION ORAL
Status: COMPLETED | OUTPATIENT
Start: 2018-11-26 | End: 2018-11-26

## 2018-11-26 RX ORDER — POTASSIUM CHLORIDE 1.5 G/1.77G
40 POWDER, FOR SOLUTION ORAL ONCE
Status: COMPLETED | OUTPATIENT
Start: 2018-11-26 | End: 2018-11-26

## 2018-11-26 RX ORDER — SODIUM CHLORIDE 9 MG/ML
100 INJECTION, SOLUTION INTRAVENOUS CONTINUOUS
Status: DISCONTINUED | OUTPATIENT
Start: 2018-11-26 | End: 2018-11-29

## 2018-11-26 RX ORDER — LANOLIN ALCOHOL/MO/W.PET/CERES
400 CREAM (GRAM) TOPICAL 2 TIMES DAILY
Status: DISCONTINUED | OUTPATIENT
Start: 2018-11-26 | End: 2018-11-29

## 2018-11-26 RX ADMIN — LISINOPRIL 5 MG: 5 TABLET ORAL at 09:06

## 2018-11-26 RX ADMIN — ENOXAPARIN SODIUM 40 MG: 40 INJECTION SUBCUTANEOUS at 11:15

## 2018-11-26 RX ADMIN — Medication 10 ML: at 06:23

## 2018-11-26 RX ADMIN — SODIUM CHLORIDE 100 ML/HR: 900 INJECTION, SOLUTION INTRAVENOUS at 18:00

## 2018-11-26 RX ADMIN — BUDESONIDE 500 MCG: 0.5 INHALANT RESPIRATORY (INHALATION) at 20:09

## 2018-11-26 RX ADMIN — BUDESONIDE 500 MCG: 0.5 INHALANT RESPIRATORY (INHALATION) at 07:44

## 2018-11-26 RX ADMIN — POTASSIUM CHLORIDE 40 MEQ: 1.5 POWDER, FOR SOLUTION ORAL at 11:15

## 2018-11-26 RX ADMIN — SAXAGLIPTIN 5 MG: 5 TABLET, FILM COATED ORAL at 09:14

## 2018-11-26 RX ADMIN — INSULIN LISPRO 3 UNITS: 100 INJECTION, SOLUTION INTRAVENOUS; SUBCUTANEOUS at 09:14

## 2018-11-26 RX ADMIN — IPRATROPIUM BROMIDE AND ALBUTEROL SULFATE 3 ML: .5; 3 SOLUTION RESPIRATORY (INHALATION) at 20:09

## 2018-11-26 RX ADMIN — IPRATROPIUM BROMIDE AND ALBUTEROL SULFATE 3 ML: .5; 3 SOLUTION RESPIRATORY (INHALATION) at 07:44

## 2018-11-26 RX ADMIN — Medication 400 MG: at 09:06

## 2018-11-26 RX ADMIN — POTASSIUM CHLORIDE 40 MEQ: 1.5 POWDER, FOR SOLUTION ORAL at 06:23

## 2018-11-26 RX ADMIN — PANTOPRAZOLE SODIUM 40 MG: 40 TABLET, DELAYED RELEASE ORAL at 15:50

## 2018-11-26 RX ADMIN — CALCITONIN SALMON 1 SPRAY: 200 SPRAY, METERED NASAL at 09:00

## 2018-11-26 RX ADMIN — SODIUM CHLORIDE 250 ML: 900 INJECTION, SOLUTION INTRAVENOUS at 13:48

## 2018-11-26 RX ADMIN — ISOSORBIDE MONONITRATE 30 MG: 30 TABLET, EXTENDED RELEASE ORAL at 09:06

## 2018-11-26 RX ADMIN — MONTELUKAST SODIUM 10 MG: 10 TABLET, COATED ORAL at 09:06

## 2018-11-26 RX ADMIN — Medication 400 MG: at 17:26

## 2018-11-26 RX ADMIN — INSULIN LISPRO 3 UNITS: 100 INJECTION, SOLUTION INTRAVENOUS; SUBCUTANEOUS at 17:26

## 2018-11-26 RX ADMIN — DULOXETINE 60 MG: 30 CAPSULE, DELAYED RELEASE ORAL at 09:06

## 2018-11-26 RX ADMIN — INSULIN LISPRO 3 UNITS: 100 INJECTION, SOLUTION INTRAVENOUS; SUBCUTANEOUS at 13:25

## 2018-11-26 RX ADMIN — CARVEDILOL 25 MG: 12.5 TABLET, FILM COATED ORAL at 09:06

## 2018-11-26 RX ADMIN — IPRATROPIUM BROMIDE AND ALBUTEROL SULFATE 3 ML: .5; 3 SOLUTION RESPIRATORY (INHALATION) at 13:20

## 2018-11-26 RX ADMIN — PANTOPRAZOLE SODIUM 40 MG: 40 TABLET, DELAYED RELEASE ORAL at 06:24

## 2018-11-26 NOTE — PROGRESS NOTES
Bedside and Verbal shift change report given to Cinthia Mcdonough RN (oncoming nurse) by Tori Birch RN (offgoing nurse). Report included the following information SBAR, Kardex, Procedure Summary, Intake/Output, MAR, Accordion, Recent Results and Med Rec Status.

## 2018-11-26 NOTE — PROGRESS NOTES
Daily Progress Note: 11/26/2018 Catrachita Maxwell MD 
 
Assessment/Plan: Dyspnea (11/13/2018) improved - workup with Pul and Card. - has completed steroid course 
  
  Type II diabetes mellitus ([facility] (10/9/2015) - with reported nephropathy with proteinuria without CKD 
            - continue saxagliptin and follow on SSI 
  
  Coronary artery disease involving native coronary artery without angina pectoris (1/22/2016) - stable, continue cardiac meds 
            - Hold plavix today, before kypho planned tomorrow 
  
  Essential hypertension (1/22/2016)- on low side at times- watching for now 
            - cont home meds - new parameters written 
  
  Chronic anticoagulation (3/30/2018) 
            -therapeutic lovenox, hold Monday AM for procedure 
  
  Obesity, BMI 30-39. 9(HCC) (10/1/2018) - counseled on weight loss, this is contributing to her current symptoms 
  
  ARF (acute renal failure) ([facility] (11/13/2018) - creatinine up acutely from prior, possibly due to diuresis 
            - hold diuretic  
  
  Closed wedge compression fracture of T7 vertebra ([facility] (11/13/2018) -ortho rec kypho -- scheduled for 11/26 
            - cardiology ok with holding anticoags for procedure Code status: 
            - patient is FULL CODE, no AMD on file, her daughter Tally Pro is her surrogate Dispo:will depend on how she does after kyphoplasty. 
   
 
Problem List: 
Problem List as of 11/26/2018 Date Reviewed: 11/13/2018 Codes Class Noted - Resolved * (Principal) Dyspnea ICD-10-CM: R06.00 
ICD-9-CM: 786.09  11/13/2018 - Present ARF (acute renal failure) (HCC) ICD-10-CM: N17.9 ICD-9-CM: 584.9  11/13/2018 - Present Obesity (BMI 30-39.9) (Chronic) ICD-10-CM: I03.9 ICD-9-CM: 278.00  11/13/2018 - Present  Closed wedge compression fracture of T7 vertebra ([facility] ICD-10-CM: S22.060A ICD-9-CM: 805.2  11/13/2018 - Present Type 2 diabetes with nephropathy ([facility] ICD-10-CM: E11.21 
ICD-9-CM: 250.40, 583.81  10/1/2018 - Present Chest pain ICD-10-CM: R07.9 ICD-9-CM: 786.50  6/27/2018 - Present Generalized abdominal pain ICD-10-CM: R10.84 ICD-9-CM: 789.07  6/27/2018 - Present Recurrent deep vein thrombosis (DVT) (HCC) ICD-10-CM: I82.409 ICD-9-CM: 453.40  3/30/2018 - Present Chronic anticoagulation (Chronic) ICD-10-CM: Z79.01 
ICD-9-CM: V58.61  3/30/2018 - Present Coronary artery disease involving native coronary artery without angina pectoris (Chronic) ICD-10-CM: I25.10 ICD-9-CM: 414.01  1/22/2016 - Present Essential hypertension (Chronic) ICD-10-CM: I10 
ICD-9-CM: 401.9  1/22/2016 - Present Type II diabetes mellitus (HCC) (Chronic) ICD-10-CM: E11.9 ICD-9-CM: 250.00  10/9/2015 - Present NSTEMI (non-ST elevated myocardial infarction) ([facility] ICD-10-CM: I21.4 ICD-9-CM: 410.70  10/9/2015 - Present RESOLVED: CAD (coronary artery disease) ICD-10-CM: I25.10 ICD-9-CM: 414.00  10/9/2015 - 1/22/2016 RESOLVED: HTN (hypertension) ICD-10-CM: I10 
ICD-9-CM: 401.9  10/9/2015 - 1/22/2016 Subjective:  
  81 y.o.  female who is admitted with SOB. Ms. Adriana Rivero presented to the Emergency Department this PM complaining of SOB: for the past 3 months, worked up by Pulmonary and Cardiology as an outpatient without any definite diagnosis, not hypoxic, associated with back pain, unable to take a deep breath without pain, back pain started at the same time as the SOB. History obtained from chart review and the patient. Previous records reviewed, recent admit for chest pain. (Dr Hemalatha Palacio). 11/14:  Feels much better this AM with less back pain on meds. Still very tender over site with any palpation. Less air hunger but still \"not breathing back to normal yet. \"  Glucoses up with the steroids.  Pul and Card seeing also. 11/15:    Reports she continues to feel better and reports no longer SOB. She is not moving much due to back pain. No chest pains. Ortho to decide next step for compression fx. No complaints of weakness or numbness LEs. 
 
11/16:  No longer short of breath - will wean steroids to po. IR plans kyphoplasty today. May benefit from Prolia for osteoporosis outpt  
 
11/17/18: Transferred to ICU yesterday for A-fib w/ RVR. Converted after Dilt bolus. Now sinus tach in the 100s. Coreg increased this am. She is not requiring a lot of pain meds but also not moving a lot. Xarelto being held and she is on Lovenox now. 11/18/18: Transferred out of ICU yesterday. Having pain with any movement. Dr Mali Chisholm would like her to stay on Plavix but is ok holding Xarelto/Lovenox for kyphoplasty. Will need to see if IR can do kyphoplasty on Plavix. Ortho getting her fitted for brace. Breathing well. No SOB. 11/19:  Little pain unless she moves about. Kyphoplasty planned for today. 11/20/18: Kyphoplasty now planned for Monday after holding Plavix. She would like to go home and return on Monday as an outpatient for the procedure. Was able to sit in chair yesterday. 11/22:  Pt was seen by PT yesterday. Rec that she needs SNF or rehab. She wants to stay here until MOnday when she gets kyphoplasty at [facility]. The logistics of this have not been coordinated yet. Will wait until tomorrow when IR is open to further figure out her transfer. Tolerating PO, +BM. 11/23: Dr Natacha Giles to do kyphoplasty today at [address] notes she has limited IV access for lab draws. Hopefully will be able to move toward discharge thanks to today's procedure. Will reevaluate her need for IV after procedure. I don't want to get a central line if she will be discharged tomorrow. Pt eager to get the procedure done. Her preference would be to go home with [place] PT. Tolerated PO yesterday, NPO this AM. : kyphoplasty was cancelled yesterday due to staffing issues. Rescheduled for Monday at 2pm, here at [facility] by Dr Diallo Bernard. PT unwilling to work with PT yesterday due to pain. Ongoing nausea and hypotensive yesterday after IV compazine. LImited IV access, although she doesn't really need it. Will try oral zofran now. : No AM labs for 2d as pt is difficult stick and has poor IV access. THis is ok with me as she is awaiting placement after her kyphoplasty. BPs on the low side again last night. Will leave parameters for her BP meds. PT still refusing to get up with PT because \"I was told not to move and to be on complete bed rest by the IR doctor. \"  Does note she has a HA today. :  No complaints other than back pain. Kyphoplasty again planned for later today. K+ 2.6 and Mag 1.3 this AM - replacing. Stable to have kyphoplasty today.   
  
Review of Systems: A comprehensive review of systems was negative except for that written in the HPI. Objective:  
Physical Exam:  
 
Visit Vitals /61 (BP 1 Location: Right arm, BP Patient Position: At rest) Pulse (!) 111 Temp 98.2 °F (36.8 °C) Resp 18 Ht 5' 7\" (1.702 m) Wt 99.4 kg (219 lb 3.2 oz) SpO2 96% Breastfeeding? No  
BMI 35.38 kg/m² O2 Flow Rate (L/min): 2 l/min O2 Device: Nasal cannula Temp (24hrs), Av °F (36.7 °C), Min:97.4 °F (36.3 °C), Max:98.5 °F (36.9 °C) 
  1901 - 700 In: -  
Out: 400 [Urine:400]   701 - 1900 In: 240 [P.O.:240] Out: 300 [Urine:300] General:  Sleeping, rousable, cooperative, obese, no distress, appears stated age. Head:  Normocephalic, without obvious abnormality, atraumatic. Eyes:  Conjunctivae/corneas clear. PERRL, EOMs intact. Nose: NC O2 in place. Throat: Lips, mucosa, and tongue moist.  
Neck: Supple, symmetrical, trachea midline, no adenopathy, thyroid: no enlargement/tenderness/nodules, no carotid bruit and no JVD. Back:   Symmetric,with marked tenderness to palpation over T6-7-8 area. Lungs:   Diminished BS bilaterally but otherwise clear at this time. Chest wall:  No tenderness or deformity. Heart:  Irregular rate and rhythm, no murmur, click, rub or gallop. Abdomen:   Soft, non-tender. Bowel sounds normal. No masses,  No organomegaly. Extremities: no cyanosis. Trace LE edema. No calf tenderness or cords. Skin:  turgor normal.  Scabbed ulcers on forearms, bruising on upper R arm Neurologic: CNII-XII intact. Alert and oriented X 3. Fine motor of hands and fingers normal.   equal.  Gait not tested at this time. Sensation grossly normal to touch. Gross motor of extremities normal.    
 
Data Review:  
  CT of Chest 11/13/18 FINDINGS: 
THYROID: No nodule. MEDIASTINUM: No mass or lymphadenopathy. SONJA: No mass or lymphadenopathy. THORACIC AORTA: No aneurysm. Mild vascular calcifications MAIN PULMONARY ARTERY: Normal in caliber. TRACHEA/BRONCHI: Patent. ESOPHAGUS: No wall thickening or dilatation. Small hiatal hernia HEART: Normal in size. PLEURA: No effusion or pneumothorax. LUNGS: No nodule, mass, or airspace disease. Bilateral atelectatic changes. INCIDENTALLY IMAGED UPPER ABDOMEN: No focal abnormality. BONES: Bones are osteopenic. There is collapse of the superior endplate of T7. This has occurred since June 2018 Possible milk of calcium cyst within the right breast 
IMPRESSION: 
1. No acute cardiopulmonary disease 2. Interval collapse superior endplate of T7. The patient has back pain this may 
be acute to subacute and MR imaging would better characterize 
  
Recent Days: 
Recent Labs  
  11/26/18 
0326 11/23/18 
0701 WBC 8.9 10.7 HGB 12.6 13.3 HCT 39.5 42.0  187 Recent Labs  
  11/26/18 
0326   
K 2.6*  
 CO2 22 * BUN 28* CREA 1.38* CA 8.7 MG 1.3* No results for input(s): PH, PCO2, PO2, HCO3, FIO2 in the last 72 hours. 24 Hour Results: 
Recent Results (from the past 24 hour(s)) GLUCOSE, POC Collection Time: 11/25/18  7:35 AM  
Result Value Ref Range Glucose (POC) 139 (H) 65 - 100 mg/dL Performed by Gerardo Willoughby ([facility]) GLUCOSE, POC Collection Time: 11/25/18 11:53 AM  
Result Value Ref Range Glucose (POC) 137 (H) 65 - 100 mg/dL Performed by Leatha Ozuna ([facility]) GLUCOSE, POC Collection Time: 11/25/18  5:03 PM  
Result Value Ref Range Glucose (POC) 184 (H) 65 - 100 mg/dL Performed by Leatha Ozuna ([facility]) GLUCOSE, POC Collection Time: 11/25/18  9:01 PM  
Result Value Ref Range Glucose (POC) 150 (H) 65 - 100 mg/dL Performed by Gerardo Willoughby ([facility]) METABOLIC PANEL, BASIC Collection Time: 11/26/18  3:26 AM  
Result Value Ref Range Sodium 137 136 - 145 mmol/L Potassium 2.6 (LL) 3.5 - 5.1 mmol/L Chloride 102 97 - 108 mmol/L  
 CO2 22 21 - 32 mmol/L Anion gap 13 5 - 15 mmol/L Glucose 129 (H) 65 - 100 mg/dL BUN 28 (H) 6 - 20 MG/DL Creatinine 1.38 (H) 0.55 - 1.02 MG/DL  
 BUN/Creatinine ratio 20 12 - 20 GFR est AA 46 (L) >60 ml/min/1.73m2 GFR est non-AA 38 (L) >60 ml/min/1.73m2 Calcium 8.7 8.5 - 10.1 MG/DL  
CBC WITH AUTOMATED DIFF Collection Time: 11/26/18  3:26 AM  
Result Value Ref Range WBC 8.9 3.6 - 11.0 K/uL  
 RBC 4.36 3.80 - 5.20 M/uL  
 HGB 12.6 11.5 - 16.0 g/dL HCT 39.5 35.0 - 47.0 % MCV 90.6 80.0 - 99.0 FL  
 MCH 28.9 26.0 - 34.0 PG  
 MCHC 31.9 30.0 - 36.5 g/dL  
 RDW 14.9 (H) 11.5 - 14.5 % PLATELET 695 508 - 744 K/uL MPV 10.7 8.9 - 12.9 FL  
 NRBC 0.0 0  WBC ABSOLUTE NRBC 0.00 0.00 - 0.01 K/uL NEUTROPHILS PENDING % LYMPHOCYTES PENDING % MONOCYTES PENDING % EOSINOPHILS PENDING % BASOPHILS PENDING % IMMATURE GRANULOCYTES PENDING %  
 ABS. NEUTROPHILS PENDING K/UL  
 ABS. LYMPHOCYTES PENDING K/UL  
 ABS. MONOCYTES PENDING K/UL  
 ABS. EOSINOPHILS PENDING K/UL  
 ABS. BASOPHILS PENDING K/UL ABS. IMM. GRANS. PENDING K/UL  
 DF PENDING   
MAGNESIUM Collection Time: 11/26/18  3:26 AM  
Result Value Ref Range Magnesium 1.3 (L) 1.6 - 2.4 mg/dL Medications reviewed Current Facility-Administered Medications Medication Dose Route Frequency  potassium chloride (KLOR-CON) packet 40 mEq  40 mEq Oral ONCE  
 ondansetron (ZOFRAN ODT) tablet 4 mg  4 mg Oral Q8H PRN  
 enoxaparin (LOVENOX) injection 40 mg  40 mg SubCUTAneous Q24H  calcitonin (salmon) (MIACALCIN) nasal 1 Spray  1 [place] IntraNASal DAILY  carvedilol (COREG) tablet 25 mg  25 mg Oral BID WITH MEALS  
 arformoterol (BROVANA) neb solution 15 mcg  15 mcg Nebulization BID RT  
 budesonide (PULMICORT) 500 mcg/2 ml nebulizer suspension  500 mcg Nebulization BID RT  
 albuterol-ipratropium (DUO-NEB) 2.5 MG-0.5 MG/3 ML  3 mL Nebulization Q6H RT  
 DULoxetine (CYMBALTA) capsule 60 mg  60 mg Oral DAILY  isosorbide mononitrate ER (IMDUR) tablet 30 mg  30 mg Oral DAILY  lisinopril (PRINIVIL, ZESTRIL) tablet 5 mg  5 mg Oral DAILY  montelukast (SINGULAIR) tablet 10 mg  10 mg Oral DAILY  sAXagliptin (ONGLYZA) tablet 5 mg  5 mg Oral DAILY  sodium chloride (NS) flush 5-10 mL  5-10 mL IntraVENous Q8H  
 sodium chloride (NS) flush 5-10 mL  5-10 mL IntraVENous PRN  
 acetaminophen (TYLENOL) tablet 650 mg  650 mg Oral Q4H PRN  
 oxyCODONE-acetaminophen (PERCOCET) 5-325 mg per tablet 1 Tab  1 Tab Oral Q4H PRN  
 HYDROmorphone (PF) (DILAUDID) injection 0.5 mg  0.5 mg IntraVENous Q4H PRN  
 zolpidem (AMBIEN) tablet 5 mg  5 mg Oral QHS PRN  
 insulin lispro (HUMALOG) injection   SubCUTAneous QID WITH MEALS  glucose chewable tablet 16 g  4 Tab Oral PRN  
 dextrose (D50W) injection syrg 12.5-25 g  25-50 mL IntraVENous PRN  
 glucagon (GLUCAGEN) injection 1 mg  1 mg IntraMUSCular PRN  pantoprazole (PROTONIX) tablet 40 mg  40 mg Oral ACB&D Care Plan discussed with: Patient and Nurse Total time spent with patient: 30 minutes.  
 
Nighat Wade MD

## 2018-11-26 NOTE — PROGRESS NOTES
1200- Call from True Schirmer in [address]. Stated that patient unlikely to have kypho today, earliest would be tomorrow at 1230. Pt to be NPO at midnight, hold all anticoagulants. Will inform patient. Walter Aguilarirmer sated she would call Dr. Rolan Elizabeth with update. 1321- In pt room with PCT, pts blood pressure 68/44. Will call Dr. Rolan Elizabeth. 46- Dr. Rolan Elizabeth called and updated. Stated we will continue to monitor patient, have ICU come and place PIV, when PIV in place administer 250 mL NS bolus. Will call ICU. 
 
1400- In patient room with charge RN. Charge RN attempted to place PIV. PIV placed. 250ml NS bolus administered. 1520- In patient room, Patient Advocate in room explaining to patient's daughter scheduling with patient's procedure. Patient's family tearful, stating frustration that kypho did not happen today and there was difficulty scheduling. 1717- Call from PCT stating pt blood pressure is 75/46. Will call Dr. Rolan Elizabeth. Stated to start patient on continuous fluids. 100ml NS per hour. Will continue to monitor patient. 1940- Bedside and Verbal shift change report given to MADAI Fernando (oncoming nurse) by Gosia Marti (offgoing nurse). Report included the following information SBAR, Kardex, Intake/Output, MAR and Recent Results.

## 2018-11-26 NOTE — PROGRESS NOTES
Bedside and Verbal shift change report given to MADAI Peña (oncoming nurse) by Barbara Hidalgo RN (offgoing nurse). Report included the following information SBAR, Kardex, Intake/Output, MAR and Recent Results.

## 2018-11-26 NOTE — PROGRESS NOTES
Problem: Pressure Injury - Risk of 
Goal: *Prevention of pressure injury Document Darrion Scale and appropriate interventions in the flowsheet. Outcome: Progressing Towards Goal 
Pressure Injury Interventions: 
Sensory Interventions: Assess changes in LOC, Float heels, Minimize linen layers Moisture Interventions: Apply protective barrier, creams and emollients, Absorbent underpads, Internal/External urinary devices, Limit adult briefs, Minimize layers, Moisture barrier, Maintain skin hydration (lotion/cream) Activity Interventions: Increase time out of bed, PT/OT evaluation Mobility Interventions: HOB 30 degrees or less, PT/OT evaluation, Pressure redistribution bed/mattress (bed type) Nutrition Interventions: Document food/fluid/supplement intake Friction and Shear Interventions: Apply protective barrier, creams and emollients, Foam dressings/transparent film/skin sealants, HOB 30 degrees or less, Lift team/patient mobility team, Minimize layers

## 2018-11-26 NOTE — ROUTINE PROCESS
Received order for kyphoplasty. Order states that Dr. Char Monzon will perform at 2pm today at [facility]. IR department and anesthesia unable to accomodate at that time. Attempted to call Dr. Char Monzon, no answer.

## 2018-11-26 NOTE — PROGRESS NOTES
Problem: Falls - Risk of 
Goal: *Absence of Falls Document Delta Mchugh Fall Risk and appropriate interventions in the flowsheet. Outcome: Progressing Towards Goal 
Fall Risk Interventions: 
Mobility Interventions: Communicate number of staff needed for ambulation/transfer Mentation Interventions: Bed/chair exit alarm Medication Interventions: Bed/chair exit alarm, Patient to call before getting OOB Elimination Interventions: Call light in reach, Patient to call for help with toileting needs History of Falls Interventions: Bed/chair exit alarm, Room close to nurse's station

## 2018-11-26 NOTE — ROUTINE PROCESS
Received phone call back from Dr. Zuleyka Briones at 1130 am. He was planning on coming in today to perform kyphoplasty at 2 pm today. Anesthesia unable to accommodate at 2pm today secondary to in EP procedures. Lovenox was given at 11am. Will need to be held for procedure. Will schedule for tomorrow at 12:30 pm. Please keep patient NPO after 12M and hold lovenox. Notified Beth Coyne RN and Dr. Aure Brown

## 2018-11-26 NOTE — PROGRESS NOTES
Critical lab result called to Dr. Hina Rivas. Patients potassium this am is 2.6. Orders received to administer Potassium 40meq PO now and Potassium 40meq PO at noon.

## 2018-11-27 ENCOUNTER — ANESTHESIA (OUTPATIENT)
Dept: INTERVENTIONAL RADIOLOGY/VASCULAR | Age: 73
DRG: 516 | End: 2018-11-27
Payer: MEDICARE

## 2018-11-27 ENCOUNTER — APPOINTMENT (OUTPATIENT)
Dept: INTERVENTIONAL RADIOLOGY/VASCULAR | Age: 73
DRG: 516 | End: 2018-11-27
Attending: FAMILY MEDICINE
Payer: MEDICARE

## 2018-11-27 LAB
ANION GAP SERPL CALC-SCNC: 13 MMOL/L (ref 5–15)
BASOPHILS # BLD: 0.1 K/UL (ref 0–0.1)
BASOPHILS NFR BLD: 1 % (ref 0–1)
BUN SERPL-MCNC: 26 MG/DL (ref 6–20)
BUN/CREAT SERPL: 15 (ref 12–20)
CALCIUM SERPL-MCNC: 8.4 MG/DL (ref 8.5–10.1)
CHLORIDE SERPL-SCNC: 107 MMOL/L (ref 97–108)
CO2 SERPL-SCNC: 20 MMOL/L (ref 21–32)
CREAT SERPL-MCNC: 1.71 MG/DL (ref 0.55–1.02)
DIFFERENTIAL METHOD BLD: ABNORMAL
EOSINOPHIL # BLD: 0.1 K/UL (ref 0–0.4)
EOSINOPHIL NFR BLD: 2 % (ref 0–7)
ERYTHROCYTE [DISTWIDTH] IN BLOOD BY AUTOMATED COUNT: 15.4 % (ref 11.5–14.5)
GLUCOSE BLD STRIP.AUTO-MCNC: 105 MG/DL (ref 65–100)
GLUCOSE BLD STRIP.AUTO-MCNC: 123 MG/DL (ref 65–100)
GLUCOSE BLD STRIP.AUTO-MCNC: 135 MG/DL (ref 65–100)
GLUCOSE BLD STRIP.AUTO-MCNC: 152 MG/DL (ref 65–100)
GLUCOSE BLD STRIP.AUTO-MCNC: 198 MG/DL (ref 65–100)
GLUCOSE SERPL-MCNC: 117 MG/DL (ref 65–100)
HCT VFR BLD AUTO: 34.8 % (ref 35–47)
HGB BLD-MCNC: 11.1 G/DL (ref 11.5–16)
IMM GRANULOCYTES # BLD: 0 K/UL
IMM GRANULOCYTES NFR BLD AUTO: 0 %
LYMPHOCYTES # BLD: 0.7 K/UL (ref 0.8–3.5)
LYMPHOCYTES NFR BLD: 11 % (ref 12–49)
MAGNESIUM SERPL-MCNC: 1.5 MG/DL (ref 1.6–2.4)
MCH RBC QN AUTO: 29.5 PG (ref 26–34)
MCHC RBC AUTO-ENTMCNC: 31.9 G/DL (ref 30–36.5)
MCV RBC AUTO: 92.6 FL (ref 80–99)
METAMYELOCYTES NFR BLD MANUAL: 1 %
MONOCYTES # BLD: 0.5 K/UL (ref 0–1)
MONOCYTES NFR BLD: 7 % (ref 5–13)
NEUTS BAND NFR BLD MANUAL: 3 % (ref 0–6)
NEUTS SEG # BLD: 5.1 K/UL (ref 1.8–8)
NEUTS SEG NFR BLD: 75 % (ref 32–75)
NRBC # BLD: 0 K/UL (ref 0–0.01)
NRBC BLD-RTO: 0 PER 100 WBC
PLATELET # BLD AUTO: 157 K/UL (ref 150–400)
PMV BLD AUTO: 10.7 FL (ref 8.9–12.9)
POTASSIUM SERPL-SCNC: 3.4 MMOL/L (ref 3.5–5.1)
RBC # BLD AUTO: 3.76 M/UL (ref 3.8–5.2)
RBC MORPH BLD: ABNORMAL
SERVICE CMNT-IMP: ABNORMAL
SODIUM SERPL-SCNC: 140 MMOL/L (ref 136–145)
WBC # BLD AUTO: 6.5 K/UL (ref 3.6–11)

## 2018-11-27 PROCEDURE — 74011000250 HC RX REV CODE- 250

## 2018-11-27 PROCEDURE — 74011250637 HC RX REV CODE- 250/637: Performed by: FAMILY MEDICINE

## 2018-11-27 PROCEDURE — 82962 GLUCOSE BLOOD TEST: CPT

## 2018-11-27 PROCEDURE — 94640 AIRWAY INHALATION TREATMENT: CPT

## 2018-11-27 PROCEDURE — 77030008684 HC TU ET CUF COVD -B: Performed by: NURSE ANESTHETIST, CERTIFIED REGISTERED

## 2018-11-27 PROCEDURE — 77030034842 HC TAMP SPN BN INFL EXP II KYPH -I

## 2018-11-27 PROCEDURE — 77030029065 HC DRSG HEMO QCLOT ZMED -B

## 2018-11-27 PROCEDURE — C1713 ANCHOR/SCREW BN/BN,TIS/BN: HCPCS

## 2018-11-27 PROCEDURE — 74011250636 HC RX REV CODE- 250/636: Performed by: FAMILY MEDICINE

## 2018-11-27 PROCEDURE — 36415 COLL VENOUS BLD VENIPUNCTURE: CPT

## 2018-11-27 PROCEDURE — 0PS43ZZ REPOSITION THORACIC VERTEBRA, PERCUTANEOUS APPROACH: ICD-10-PCS | Performed by: RADIOLOGY

## 2018-11-27 PROCEDURE — 74011000250 HC RX REV CODE- 250: Performed by: RADIOLOGY

## 2018-11-27 PROCEDURE — 77010033678 HC OXYGEN DAILY

## 2018-11-27 PROCEDURE — 80048 BASIC METABOLIC PNL TOTAL CA: CPT

## 2018-11-27 PROCEDURE — 83735 ASSAY OF MAGNESIUM: CPT

## 2018-11-27 PROCEDURE — 74011250637 HC RX REV CODE- 250/637: Performed by: INTERNAL MEDICINE

## 2018-11-27 PROCEDURE — 74011250636 HC RX REV CODE- 250/636

## 2018-11-27 PROCEDURE — 94760 N-INVAS EAR/PLS OXIMETRY 1: CPT

## 2018-11-27 PROCEDURE — 0PU43JZ SUPPLEMENT THORACIC VERTEBRA WITH SYNTHETIC SUBSTITUTE, PERCUTANEOUS APPROACH: ICD-10-PCS | Performed by: RADIOLOGY

## 2018-11-27 PROCEDURE — 76210000000 HC OR PH I REC 2 TO 2.5 HR

## 2018-11-27 PROCEDURE — 22513 PERQ VERTEBRAL AUGMENTATION: CPT

## 2018-11-27 PROCEDURE — 77030030399

## 2018-11-27 PROCEDURE — 94660 CPAP INITIATION&MGMT: CPT

## 2018-11-27 PROCEDURE — 85025 COMPLETE CBC W/AUTO DIFF WBC: CPT

## 2018-11-27 PROCEDURE — 77030003666 HC NDL SPINAL BD -A

## 2018-11-27 PROCEDURE — 74011636637 HC RX REV CODE- 636/637: Performed by: FAMILY MEDICINE

## 2018-11-27 PROCEDURE — 65270000029 HC RM PRIVATE

## 2018-11-27 PROCEDURE — 74011000250 HC RX REV CODE- 250: Performed by: INTERNAL MEDICINE

## 2018-11-27 PROCEDURE — 77030038269 HC DRN EXT URIN PURWCK BARD -A

## 2018-11-27 PROCEDURE — 74011250636 HC RX REV CODE- 250/636: Performed by: RADIOLOGY

## 2018-11-27 PROCEDURE — 76060000033 HC ANESTHESIA 1 TO 1.5 HR

## 2018-11-27 RX ORDER — CEFAZOLIN SODIUM 1 G/3ML
INJECTION, POWDER, FOR SOLUTION INTRAMUSCULAR; INTRAVENOUS AS NEEDED
Status: DISCONTINUED | OUTPATIENT
Start: 2018-11-27 | End: 2018-11-27 | Stop reason: HOSPADM

## 2018-11-27 RX ORDER — NALOXONE HYDROCHLORIDE 0.4 MG/ML
0.04 INJECTION, SOLUTION INTRAMUSCULAR; INTRAVENOUS; SUBCUTANEOUS
Status: DISCONTINUED | OUTPATIENT
Start: 2018-11-27 | End: 2018-11-27 | Stop reason: HOSPADM

## 2018-11-27 RX ORDER — HYDROMORPHONE HYDROCHLORIDE 1 MG/ML
.25-1 INJECTION, SOLUTION INTRAMUSCULAR; INTRAVENOUS; SUBCUTANEOUS
Status: DISCONTINUED | OUTPATIENT
Start: 2018-11-27 | End: 2018-11-27 | Stop reason: HOSPADM

## 2018-11-27 RX ORDER — EPINEPHRINE 1 MG/ML
INJECTION, SOLUTION, CONCENTRATE INTRAVENOUS AS NEEDED
Status: DISCONTINUED | OUTPATIENT
Start: 2018-11-27 | End: 2018-11-27 | Stop reason: HOSPADM

## 2018-11-27 RX ORDER — LIDOCAINE HYDROCHLORIDE 20 MG/ML
INJECTION, SOLUTION EPIDURAL; INFILTRATION; INTRACAUDAL; PERINEURAL AS NEEDED
Status: DISCONTINUED | OUTPATIENT
Start: 2018-11-27 | End: 2018-11-27 | Stop reason: HOSPADM

## 2018-11-27 RX ORDER — SODIUM CHLORIDE 0.9 % (FLUSH) 0.9 %
5-10 SYRINGE (ML) INJECTION AS NEEDED
Status: DISCONTINUED | OUTPATIENT
Start: 2018-11-27 | End: 2018-11-27 | Stop reason: HOSPADM

## 2018-11-27 RX ORDER — HYDROCORTISONE SODIUM SUCCINATE 100 MG/2ML
INJECTION, POWDER, FOR SOLUTION INTRAMUSCULAR; INTRAVENOUS AS NEEDED
Status: DISCONTINUED | OUTPATIENT
Start: 2018-11-27 | End: 2018-11-27 | Stop reason: HOSPADM

## 2018-11-27 RX ORDER — DIPHENHYDRAMINE HYDROCHLORIDE 50 MG/ML
12.5 INJECTION, SOLUTION INTRAMUSCULAR; INTRAVENOUS AS NEEDED
Status: DISCONTINUED | OUTPATIENT
Start: 2018-11-27 | End: 2018-11-27 | Stop reason: HOSPADM

## 2018-11-27 RX ORDER — LIDOCAINE HYDROCHLORIDE 10 MG/ML
0.1 INJECTION, SOLUTION EPIDURAL; INFILTRATION; INTRACAUDAL; PERINEURAL AS NEEDED
Status: DISCONTINUED | OUTPATIENT
Start: 2018-11-27 | End: 2018-11-27 | Stop reason: SDUPTHER

## 2018-11-27 RX ORDER — PHENYLEPHRINE HCL IN 0.9% NACL 0.4MG/10ML
SYRINGE (ML) INTRAVENOUS AS NEEDED
Status: DISCONTINUED | OUTPATIENT
Start: 2018-11-27 | End: 2018-11-27 | Stop reason: HOSPADM

## 2018-11-27 RX ORDER — PHENYLEPHRINE HYDROCHLORIDE 10 MG/ML
INJECTION INTRAVENOUS
Status: DISPENSED
Start: 2018-11-27 | End: 2018-11-28

## 2018-11-27 RX ORDER — BUPIVACAINE HYDROCHLORIDE 7.5 MG/ML
.5-1 INJECTION, SOLUTION EPIDURAL; RETROBULBAR
Status: DISCONTINUED | OUTPATIENT
Start: 2018-11-27 | End: 2018-11-27 | Stop reason: HOSPADM

## 2018-11-27 RX ORDER — SODIUM CHLORIDE, SODIUM LACTATE, POTASSIUM CHLORIDE, CALCIUM CHLORIDE 600; 310; 30; 20 MG/100ML; MG/100ML; MG/100ML; MG/100ML
125 INJECTION, SOLUTION INTRAVENOUS CONTINUOUS
Status: DISCONTINUED | OUTPATIENT
Start: 2018-11-27 | End: 2018-11-27 | Stop reason: HOSPADM

## 2018-11-27 RX ORDER — SODIUM CHLORIDE 0.9 % (FLUSH) 0.9 %
5-10 SYRINGE (ML) INJECTION EVERY 8 HOURS
Status: DISCONTINUED | OUTPATIENT
Start: 2018-11-27 | End: 2018-11-27 | Stop reason: HOSPADM

## 2018-11-27 RX ORDER — PROPOFOL 10 MG/ML
INJECTION, EMULSION INTRAVENOUS AS NEEDED
Status: DISCONTINUED | OUTPATIENT
Start: 2018-11-27 | End: 2018-11-27 | Stop reason: HOSPADM

## 2018-11-27 RX ORDER — FENTANYL CITRATE 50 UG/ML
INJECTION, SOLUTION INTRAMUSCULAR; INTRAVENOUS
Status: COMPLETED
Start: 2018-11-27 | End: 2018-11-27

## 2018-11-27 RX ORDER — EPHEDRINE SULFATE 50 MG/ML
INJECTION, SOLUTION INTRAVENOUS
Status: COMPLETED
Start: 2018-11-27 | End: 2018-11-27

## 2018-11-27 RX ORDER — CEFAZOLIN SODIUM/WATER 2 G/20 ML
2 SYRINGE (ML) INTRAVENOUS ONCE
Status: COMPLETED | OUTPATIENT
Start: 2018-11-27 | End: 2018-11-27

## 2018-11-27 RX ORDER — POTASSIUM CHLORIDE 750 MG/1
10 TABLET, FILM COATED, EXTENDED RELEASE ORAL 2 TIMES DAILY
Status: DISCONTINUED | OUTPATIENT
Start: 2018-11-27 | End: 2018-12-01

## 2018-11-27 RX ORDER — LIDOCAINE HYDROCHLORIDE 10 MG/ML
0.1 INJECTION, SOLUTION EPIDURAL; INFILTRATION; INTRACAUDAL; PERINEURAL AS NEEDED
Status: DISCONTINUED | OUTPATIENT
Start: 2018-11-27 | End: 2018-11-27 | Stop reason: HOSPADM

## 2018-11-27 RX ORDER — FENTANYL CITRATE 50 UG/ML
INJECTION, SOLUTION INTRAMUSCULAR; INTRAVENOUS AS NEEDED
Status: DISCONTINUED | OUTPATIENT
Start: 2018-11-27 | End: 2018-11-27 | Stop reason: HOSPADM

## 2018-11-27 RX ORDER — LIDOCAINE HYDROCHLORIDE 10 MG/ML
10-20 INJECTION INFILTRATION; PERINEURAL
Status: DISCONTINUED | OUTPATIENT
Start: 2018-11-27 | End: 2018-11-27 | Stop reason: HOSPADM

## 2018-11-27 RX ORDER — SUCCINYLCHOLINE CHLORIDE 100 MG/5ML
SYRINGE (ML) INTRAVENOUS
Status: DISPENSED
Start: 2018-11-27 | End: 2018-11-28

## 2018-11-27 RX ORDER — FLUMAZENIL 0.1 MG/ML
0.2 INJECTION INTRAVENOUS
Status: DISCONTINUED | OUTPATIENT
Start: 2018-11-27 | End: 2018-11-27 | Stop reason: HOSPADM

## 2018-11-27 RX ORDER — EPINEPHRINE 1 MG/ML
INJECTION, SOLUTION, CONCENTRATE INTRAVENOUS
Status: DISPENSED
Start: 2018-11-27 | End: 2018-11-28

## 2018-11-27 RX ORDER — EPHEDRINE SULFATE 50 MG/ML
INJECTION, SOLUTION INTRAVENOUS AS NEEDED
Status: DISCONTINUED | OUTPATIENT
Start: 2018-11-27 | End: 2018-11-27 | Stop reason: HOSPADM

## 2018-11-27 RX ORDER — HYDROMORPHONE HYDROCHLORIDE 2 MG/ML
.25-1 INJECTION, SOLUTION INTRAMUSCULAR; INTRAVENOUS; SUBCUTANEOUS
Status: DISCONTINUED | OUTPATIENT
Start: 2018-11-27 | End: 2018-11-27 | Stop reason: HOSPADM

## 2018-11-27 RX ORDER — SUCCINYLCHOLINE CHLORIDE 20 MG/ML
INJECTION INTRAMUSCULAR; INTRAVENOUS AS NEEDED
Status: DISCONTINUED | OUTPATIENT
Start: 2018-11-27 | End: 2018-11-27 | Stop reason: HOSPADM

## 2018-11-27 RX ADMIN — POTASSIUM CHLORIDE 10 MEQ: 750 TABLET, EXTENDED RELEASE ORAL at 18:04

## 2018-11-27 RX ADMIN — BUPIVACAINE HYDROCHLORIDE 75 MG: 7.5 INJECTION, SOLUTION EPIDURAL; RETROBULBAR at 14:56

## 2018-11-27 RX ADMIN — MONTELUKAST SODIUM 10 MG: 10 TABLET, COATED ORAL at 08:59

## 2018-11-27 RX ADMIN — Medication 2 G: at 14:00

## 2018-11-27 RX ADMIN — PANTOPRAZOLE SODIUM 40 MG: 40 TABLET, DELAYED RELEASE ORAL at 18:03

## 2018-11-27 RX ADMIN — Medication 400 MG: at 18:04

## 2018-11-27 RX ADMIN — SODIUM CHLORIDE: 900 INJECTION, SOLUTION INTRAVENOUS at 14:33

## 2018-11-27 RX ADMIN — LIDOCAINE HYDROCHLORIDE 80 MG: 20 INJECTION, SOLUTION EPIDURAL; INFILTRATION; INTRACAUDAL; PERINEURAL at 14:17

## 2018-11-27 RX ADMIN — CARVEDILOL 25 MG: 12.5 TABLET, FILM COATED ORAL at 18:03

## 2018-11-27 RX ADMIN — CEFAZOLIN SODIUM 2 G: 1 INJECTION, POWDER, FOR SOLUTION INTRAMUSCULAR; INTRAVENOUS at 14:29

## 2018-11-27 RX ADMIN — SUCCINYLCHOLINE CHLORIDE 100 MG: 20 INJECTION INTRAMUSCULAR; INTRAVENOUS at 14:18

## 2018-11-27 RX ADMIN — LIDOCAINE HYDROCHLORIDE 10 ML: 10 INJECTION, SOLUTION INFILTRATION; PERINEURAL at 14:55

## 2018-11-27 RX ADMIN — Medication 10 ML: at 06:54

## 2018-11-27 RX ADMIN — ENOXAPARIN SODIUM 40 MG: 40 INJECTION SUBCUTANEOUS at 18:04

## 2018-11-27 RX ADMIN — Medication 10 ML: at 00:25

## 2018-11-27 RX ADMIN — PROPOFOL 80 MG: 10 INJECTION, EMULSION INTRAVENOUS at 14:17

## 2018-11-27 RX ADMIN — CARVEDILOL 25 MG: 12.5 TABLET, FILM COATED ORAL at 08:59

## 2018-11-27 RX ADMIN — Medication 300 MCG: at 14:48

## 2018-11-27 RX ADMIN — SAXAGLIPTIN 5 MG: 5 TABLET, FILM COATED ORAL at 09:58

## 2018-11-27 RX ADMIN — Medication 100 MCG: at 14:25

## 2018-11-27 RX ADMIN — EPHEDRINE SULFATE 15 MG: 50 INJECTION, SOLUTION INTRAVENOUS at 14:48

## 2018-11-27 RX ADMIN — Medication 200 MCG: at 14:32

## 2018-11-27 RX ADMIN — Medication 400 MG: at 08:59

## 2018-11-27 RX ADMIN — HYDROCORTISONE SODIUM SUCCINATE 100 MG: 100 INJECTION, POWDER, FOR SOLUTION INTRAMUSCULAR; INTRAVENOUS at 15:08

## 2018-11-27 RX ADMIN — EPHEDRINE SULFATE 10 MG: 50 INJECTION, SOLUTION INTRAVENOUS at 14:37

## 2018-11-27 RX ADMIN — CALCITONIN SALMON 1 SPRAY: 200 SPRAY, METERED NASAL at 09:03

## 2018-11-27 RX ADMIN — EPHEDRINE SULFATE 15 MG: 50 INJECTION, SOLUTION INTRAVENOUS at 14:42

## 2018-11-27 RX ADMIN — PANTOPRAZOLE SODIUM 40 MG: 40 TABLET, DELAYED RELEASE ORAL at 06:54

## 2018-11-27 RX ADMIN — Medication 10 ML: at 22:00

## 2018-11-27 RX ADMIN — PROPOFOL 20 MG: 10 INJECTION, EMULSION INTRAVENOUS at 14:18

## 2018-11-27 RX ADMIN — DULOXETINE 60 MG: 30 CAPSULE, DELAYED RELEASE ORAL at 08:59

## 2018-11-27 RX ADMIN — Medication 200 MCG: at 14:42

## 2018-11-27 RX ADMIN — SODIUM CHLORIDE 100 ML/HR: 900 INJECTION, SOLUTION INTRAVENOUS at 12:02

## 2018-11-27 RX ADMIN — INSULIN LISPRO 3 UNITS: 100 INJECTION, SOLUTION INTRAVENOUS; SUBCUTANEOUS at 18:50

## 2018-11-27 RX ADMIN — IPRATROPIUM BROMIDE AND ALBUTEROL SULFATE 3 ML: .5; 3 SOLUTION RESPIRATORY (INHALATION) at 08:25

## 2018-11-27 RX ADMIN — EPINEPHRINE 0.02 MG: 1 INJECTION, SOLUTION, CONCENTRATE INTRAVENOUS at 14:51

## 2018-11-27 RX ADMIN — OXYCODONE AND ACETAMINOPHEN 1 TABLET: 5; 325 TABLET ORAL at 06:54

## 2018-11-27 RX ADMIN — ISOSORBIDE MONONITRATE 30 MG: 30 TABLET, EXTENDED RELEASE ORAL at 08:59

## 2018-11-27 RX ADMIN — EPINEPHRINE 0.02 MG: 1 INJECTION, SOLUTION, CONCENTRATE INTRAVENOUS at 14:58

## 2018-11-27 RX ADMIN — BUDESONIDE 500 MCG: 0.5 INHALANT RESPIRATORY (INHALATION) at 19:33

## 2018-11-27 RX ADMIN — FENTANYL CITRATE 25 MCG: 50 INJECTION, SOLUTION INTRAMUSCULAR; INTRAVENOUS at 14:28

## 2018-11-27 RX ADMIN — IPRATROPIUM BROMIDE AND ALBUTEROL SULFATE 3 ML: .5; 3 SOLUTION RESPIRATORY (INHALATION) at 19:33

## 2018-11-27 RX ADMIN — BUDESONIDE 500 MCG: 0.5 INHALANT RESPIRATORY (INHALATION) at 08:25

## 2018-11-27 NOTE — PROGRESS NOTES
Name: Yanelis Coyne: [address]  
: 1945 Admit Date: 2018 Phone: [phone]  Room: [facility]/01 PCP: Shanell Smith MD  MRN: [id number] Date: 2018  Code: Full Code Chart and notes reviewed. Data reviewed. I review the patient's current medications in the medical record at each encounter. I have evaluated and examined the patient. Asked to see the patient for hypotension s/p kyphoplasty today. BP has improved with approximately 400cc of IVF and SBP in the 120's during my evaluation. Tells me her breathing is doing well today. Worn out from the procedure, but otherwise has no complaints. Past Medical History:  
Diagnosis Date  Asthma  Atrial fibrillation ([address] 2018  Autoimmune disease ([address]   
 fibromyalgia  CAD (coronary artery disease) 10/9/2015  Closed wedge compression fracture of T7 vertebra ([address] 2018  Diabetes ([address]  DVT (deep vein thrombosis) in pregnancy ([address]  GERD (gastroesophageal reflux disease)  HTN (hypertension)  NSTEMI (non-ST elevated myocardial infarction) ([address] 10/9/2015  Obesity (BMI 30-39.9) 2018  Sleep apnea   
 wears CPAP Past Surgical History:  
Procedure Laterality Date  ABDOMEN SURGERY PROC UNLISTED    
 hital hernia repair, gall bladder removed  BREAST SURGERY PROCEDURE UNLISTED    
 lumpectomy  CARDIAC SURG PROCEDURE UNLIST  10/9/15  
 2 stents [place]  HX COLOSTOMY    
 and reversal, post episiotomy repair [address]  HX UROLOGICAL  2009  
 bladder sling Family History Problem Relation Age of Onset  Diabetes Mother  Heart Failure Mother  Kidney Disease Mother  Diabetes Father  Heart Disease Father  Elevated Lipids Father  Heart Failure Father  Heart Disease Brother  Cancer Brother   
     kidney  Diabetes Brother  No Known Problems Brother  No Known Problems Brother Social History Tobacco Use  Smoking status: Former Smoker Last attempt to quit: 3/30/2017 Years since quittin.6  Smokeless tobacco: Never Used Substance Use Topics  Alcohol use: No  
  Alcohol/week: 0.0 oz  
  Comment: very very rarely Allergies Allergen Reactions  Advair Diskus [Fluticasone-Salmeterol] Rash  Aspartame Other (comments)  Ciprofloxacin Rash  Statins-Hmg-Coa Reductase Inhibitors Other (comments) Muscle pain Lipitor/crestor/zocor  Sulfa (Sulfonamide Antibiotics) Rash  Tetracycline Other (comments) musclepain  Zetia [Ezetimibe] Myalgia Current Facility-Administered Medications Medication Dose Route Frequency  potassium chloride SR (KLOR-CON 10) tablet 10 mEq  10 mEq Oral BID  PHENYLephrine (IVÁN-SYNEPHRINE) 30 mg in 0.9% sodium chloride 250 mL infusion   mcg/min IntraVENous TITRATE  EPINEPHrine HCl (PF) (ADRENALIN) 1 mg/mL (1 mL) injection  succinylcholine (ANECTINE) 100 mg/5 mL (20 mg/mL) 20 mg/mL syringe  PHENYLephrine (IVÁN-SYNEPHRINE) 10 mg/mL injection  magnesium oxide (MAG-OX) tablet 400 mg  400 mg Oral BID  
 0.9% sodium chloride infusion  100 mL/hr IntraVENous CONTINUOUS  
 ondansetron (ZOFRAN ODT) tablet 4 mg  4 mg Oral Q8H PRN  
 enoxaparin (LOVENOX) injection 40 mg  40 mg SubCUTAneous Q24H  calcitonin (salmon) (MIACALCIN) nasal 1 Spray  1 [place] IntraNASal DAILY  carvedilol (COREG) tablet 25 mg  25 mg Oral BID WITH MEALS  
 arformoterol (BROVANA) neb solution 15 mcg  15 mcg Nebulization BID RT  
 budesonide (PULMICORT) 500 mcg/2 ml nebulizer suspension  500 mcg Nebulization BID RT  
 albuterol-ipratropium (DUO-NEB) 2.5 MG-0.5 MG/3 ML  3 mL Nebulization Q6H RT  
 DULoxetine (CYMBALTA) capsule 60 mg  60 mg Oral DAILY  isosorbide mononitrate ER (IMDUR) tablet 30 mg  30 mg Oral DAILY  lisinopril (PRINIVIL, ZESTRIL) tablet 5 mg  5 mg Oral DAILY  montelukast (SINGULAIR) tablet 10 mg  10 mg Oral DAILY  sAXagliptin (ONGLYZA) tablet 5 mg  5 mg Oral DAILY  sodium chloride (NS) flush 5-10 mL  5-10 mL IntraVENous Q8H  
 sodium chloride (NS) flush 5-10 mL  5-10 mL IntraVENous PRN  
 acetaminophen (TYLENOL) tablet 650 mg  650 mg Oral Q4H PRN  
 oxyCODONE-acetaminophen (PERCOCET) 5-325 mg per tablet 1 Tab  1 Tab Oral Q4H PRN  
 HYDROmorphone (PF) (DILAUDID) injection 0.5 mg  0.5 mg IntraVENous Q4H PRN  
 zolpidem (AMBIEN) tablet 5 mg  5 mg Oral QHS PRN  
 insulin lispro (HUMALOG) injection   SubCUTAneous QID WITH MEALS  glucose chewable tablet 16 g  4 Tab Oral PRN  
 dextrose (D50W) injection syrg 12.5-25 g  25-50 mL IntraVENous PRN  
 glucagon (GLUCAGEN) injection 1 mg  1 mg IntraMUSCular PRN  pantoprazole (PROTONIX) tablet 40 mg  40 mg Oral ACB&D REVIEW OF SYSTEMS  
12 point ROS negative except as stated in the HPI. Physical Exam:  
Visit Vitals /65 Pulse 95 Temp 98.2 °F (36.8 °C) Resp 20 Ht 5' 7\" (1.702 m) Wt 99.4 kg (219 lb 3.2 oz) SpO2 98% Breastfeeding? No  
BMI 35.38 kg/m² General:  Alert, cooperative, no acute distress, appears stated age. Head:  Normocephalic, without obvious abnormality, atraumatic. Eyes:  Conjunctivae/corneas clear. Nose: Nares normal. Septum midline. Mucosa normal.   
Throat: Lips, mucosa, and tongue normal.   
Neck: Supple, symmetrical, trachea midline, no adenopathy. Lungs:   Mildly diminished but clear. No wheeze or rales. Resp nonlabored. Chest wall:  No tenderness or deformity. Heart:  Regular rate and rhythm, S1, S2 normal, no murmur, click, rub or gallop. Abdomen:   Obese, soft, non-tender. Bowel sounds normal. No masses,  No organomegaly. Extremities: Extremities normal, atraumatic, no cyanosis or edema. Pulses: 2+ and symmetric all extremities. Skin: Skin color, texture, turgor normal. No rashes or lesions Lymph nodes: Cervical, supraclavicular nodes normal.  
Neurologic: Grossly nonfocal  
 
 
Lab Results Component Value Date/Time Sodium 140 11/27/2018 05:23 AM  
 Potassium 3.4 (L) 11/27/2018 05:23 AM  
 Chloride 107 11/27/2018 05:23 AM  
 CO2 20 (L) 11/27/2018 05:23 AM  
 BUN 26 (H) 11/27/2018 05:23 AM  
 Creatinine 1.71 (H) 11/27/2018 05:23 AM  
 Glucose 117 (H) 11/27/2018 05:23 AM  
 Calcium 8.4 (L) 11/27/2018 05:23 AM  
 Magnesium 1.5 (L) 11/27/2018 05:23 AM  
 Lactic acid 1.4 06/28/2018 04:22 AM  
 
 
Lab Results Component Value Date/Time WBC 6.5 11/27/2018 05:23 AM  
 HGB 11.1 (L) 11/27/2018 05:23 AM  
 PLATELET 877 29/14/7571 05:23 AM  
 MCV 92.6 11/27/2018 05:23 AM  
 
 
Lab Results Component Value Date/Time INR 1.1 10/17/2018 12:53 PM  
 AST (SGOT) 15 11/21/2018 03:49 AM  
 Alk. phosphatase 117 11/21/2018 03:49 AM  
 Protein, total 5.9 (L) 11/21/2018 03:49 AM  
 Albumin 2.5 (L) 11/21/2018 03:49 AM  
 Globulin 3.4 11/21/2018 03:49 AM  
 
 
No results found for: IRON, FE, TIBC, IBCT, PSAT, FERR Lab Results Component Value Date/Time TSH 0.58 11/13/2018 03:26 PM  
  
 
No results found for: PH, PHI, PCO2, PCO2I, PO2, PO2I, HCO3, HCO3I, FIO2, FIO2I Lab Results Component Value Date/Time CK 25 (L) 11/13/2018 03:26 PM  
 CK-MB Index 4.4 (H) 11/13/2018 03:26 PM  
 Troponin-I, Qt. <0.05 11/13/2018 03:26 PM  
  
 
Lab Results Component Value Date/Time Culture result: ESCHERICHIA COLI (PREDOMINATING) (A) 06/28/2018 07:01 AM  
 Culture result: ENTEROBACTER AEROGENES (A) 06/28/2018 07:01 AM  
 Culture result: (A) 06/28/2018 04:13 AM  
  ENTEROBACTER AEROGENES GROWING IN 1 OF 4 BOTTLES DRAWN (SITE=L UPPER ARM) Culture result: (A) 06/28/2018 04:13 AM  
  PRELIMINARY REPORT OF 
GRAM NEGATIVE COCCOBACILLI 
GROWING IN 1 OF 4 BOTTLES DRAWN 
CALLED TO AND READ BACK BY 
CHANG HUNTLEY RN [facility] AT [id number] ON 6/28/2018.  [place] [id number] 
  
 Culture result: REMAINING BOTTLE(S) HAS/HAVE NO GROWTH IN 5 DAYS 06/28/2018 04:13 AM  
 
 
No results found for: TOXA1, RPR, HBCM, HBSAG, HAAB, HCAB1, HAAT, G6PD, CRYAC, HIVGT, HIVR, HIV1, HIV12, HIVPC, HIVRPI Lab Results Component Value Date/Time CK 25 (L) 11/13/2018 03:26 PM  
 
 
Lab Results Component Value Date/Time Color YELLOW/STRAW 11/13/2018 07:58 PM  
 Appearance CLOUDY (A) 11/13/2018 07:58 PM  
 Specific gravity 1.020 04/16/2017 01:13 PM  
 pH (UA) 5.0 11/13/2018 07:58 PM  
 Protein TRACE (A) 11/13/2018 07:58 PM  
 Glucose >1,000 (A) 11/13/2018 07:58 PM  
 Ketone NEGATIVE  11/13/2018 07:58 PM  
 Bilirubin NEGATIVE  11/13/2018 07:58 PM  
 Blood TRACE (A) 11/13/2018 07:58 PM  
 Urobilinogen 0.2 11/13/2018 07:58 PM  
 Nitrites NEGATIVE  11/13/2018 07:58 PM  
 Leukocyte Esterase MODERATE (A) 11/13/2018 07:58 PM  
 WBC  11/13/2018 07:58 PM  
 RBC 0-5 11/13/2018 07:58 PM  
 Bacteria 4+ (A) 11/13/2018 07:58 PM  
 
 
Images: no new images this morning IMPRESSION 
· COPD exacerbation · Shortness of breath · Pulmonary hypertension, likely secondary to underlying obstructive lung disease and HAYES, but does have a history of CTD · History of DVT, on Xarelto · NAIDA · Closed wedge compression fracture of T7 vertebra · Afib with RVR: currently SR 
· Hypotension, improved PLAN 
· On coreg for rate control, may need to hold in the morning if BP does not remain stable · Wean O2 for goal sats >90% · Duonebs; brovana/pulmicort · Singulair · Prednisone taper completed · Gentle IVFs; trend creat · Cardiac diet · Ortho following: kyphoplasty with IR today · CPAP nightly and with naps for known HAYES · PPI 
· On xarelto chronically, plavix for stents, currently on hold for procedure Stable to return to the floor.  
 
 
Cal Herrera MD

## 2018-11-27 NOTE — ANESTHESIA PREPROCEDURE EVALUATION
Anesthetic History No history of anesthetic complications Review of Systems / Medical History Patient summary reviewed, nursing notes reviewed and pertinent labs reviewed Pulmonary Sleep apnea: CPAP Asthma Neuro/Psych Within defined limits Cardiovascular Hypertension Dysrhythmias : atrial fibrillation Past MI (2015), CAD and cardiac stents Comments: On BB Plavix being held for procedure, started on lovenox that is to be held prior to procedure. GI/Hepatic/Renal 
  
GERD Hiatal hernia Comments: Renal insufficiency, Cr. 1.7 Endo/Other Diabetes Obesity Other Findings Comments: Fibromyalgia Daily prednisone use for temporal arteritis History of DVT 
K+ = 2.6 earlier today, needs repletion and check again prior to proceeding with anesthesia Physical Exam 
 
Airway Mallampati: IV 
TM Distance: 4 - 6 cm Neck ROM: short neck Mouth opening: Normal 
 
 Cardiovascular Rhythm: regular Rate: normal 
 
 
 
 Dental 
 
Dentition: Lower dentition intact, Upper dentition intact and Caps/crowns Pulmonary Breath sounds clear to auscultation Abdominal 
GI exam deferred Other Findings Anesthetic Plan ASA: 4 Anesthesia type: MAC and general 
 
 
 
 
Induction: Intravenous Anesthetic plan and risks discussed with: Patient Patient is hypokalemic,  repletion ordered by primary care team and basic metabolic ordered for AM. Patient has tenuous iv access, 22 g left forearm.

## 2018-11-27 NOTE — PROGRESS NOTES
Ultrasound guided IV placed in pt's R upper arm. Midline was ordered, but plan is for pt to be discharged soon after scheduled kyphoplasty today and as pt has few IV medications, midline is not appropriate line choice at this point in time. Theresa Victoria RN notified. Pt taken to angio department for kyphoplasty.

## 2018-11-27 NOTE — PROGRESS NOTES
CM informed Mcloud Bernie of pt's procedure scheduled for today. Admissions inquired if Pt would need to discharge back to independent living with Rehab or if Pt would need to discharge to SNF. CM will continue to follow-up with discharge planning. ERIC Feldman, CM [facility]

## 2018-11-27 NOTE — PROGRESS NOTES
Pt chart accessed to review all areas including but not limited to pt history, test results, labs, and orders in preparation for possible Angio/Cath/EP procedure. 11:38 AM 
Pt received from radiology after PIV ultrasound guided IV in prep for kyphoplasty. Pt states she has not eaten or had drink today. Consents obtained. Pt states her family \"son\"stayed up in patient inpatient room. Denies any pain at this time. CHG wipes to back completed. 12:25 PM 
Anesthesia at bedside speaking with patient [phone] and spoke to pt inpatient nurse and asked to update family in pt room that pt is now in procedure

## 2018-11-27 NOTE — PROGRESS NOTES
TRANSFER - OUT REPORT: 
 
Verbal report given to [employer] RN(name) on Gisela Tyson  being transferred to [facility](unit) for ordered procedure Report consisted of patients Situation, Background, Assessment and  
Recommendations(SBAR). Information from the following report(s) SBAR was reviewed with the receiving nurse. Lines:  
Peripheral IV 11/27/18 Right;Upper Cephalic (Active) Site Assessment Clean, dry, & intact 11/27/2018 11:50 AM  
Phlebitis Assessment 0 11/27/2018 11:50 AM  
Infiltration Assessment 0 11/27/2018 11:50 AM  
Dressing Status Clean, dry, & intact 11/27/2018 11:50 AM  
Dressing Type Transparent 11/27/2018 11:35 AM  
Hub Color/Line Status Pink 11/27/2018 11:35 AM  
  
 
Opportunity for questions and clarification was provided. Patient transported with: 
 Registered Nurse Tech

## 2018-11-27 NOTE — PROGRESS NOTES
Nutrition Assessment: 
 
RECOMMENDATIONS/INTERVENTION(S):  
Continue CCD diet Monitor PO intakes, BG, weight- please reweigh pt Added Glucerna BID for post surgical protein intake ASSESSMENT:  
11/27: No new weight since 11/19. Pt OOR for kyphoplasty currently. Pt was NPO this morning. Pt had been eating ~50% of meals. Didn't get to eat much yesterday 2/2 schedule of kypho which was then rescheduled for today. Edema 1+ all extremities. Last BM 11/26. K+ 2.6 yesterday, 3.4 today. , 129 mg/dL. Cr 1.71. Mg 1.5.   
 
11/20: 81 yr old female admitted for SOB. PMHx: DM, HTN, CAD, DVT. Pt screened for LOS. Breathing improved. Pt here now for possible kyphoplasty. Delayed 2/2 medication regimen. Documented as eating well, >75% most of the time. Pt BG elevated- on steroids but tapering now. , 242, 271, 246 mg/dL. A1C 8.5. DTC following. Last BM today. Noted weight down to 219 lbs from 243 lbs 5 days ago. Usual weight is ~250 lbs. Please reweigh pt. Labs reviewed. SUBJECTIVE/OBJECTIVE:  
Diet Order: Consistent carb 
% Eaten:   
Patient Vitals for the past 168 hrs: 
 % Diet Eaten 11/26/18 1311 0 % 11/26/18 0907 0 % 11/23/18 1203 50 % 11/21/18 1058 50 % Pertinent Medications: [x] Reviewed Labs reviewed:  [x] Anthropometrics: Height: 5' 7\" (170.2 cm) Weight: 99.4 kg (219 lb 3.2 oz) IBW (%IBW):   ( ) UBW (%UBW):   (  %) BMI: Body mass index is 35.38 kg/m². This BMI is indicative of: 
 
 [] Underweight    [] Normal    [] Overweight    [x]  Obesity    []  Extreme Obesity (BMI>40) Estimated Nutrition Needs (Based on): 0352 Kcals/day , 99 g(-119g/day(1.0-1.2g/kg)) Protein Carbohydrate: At Least 130 g/day  Fluids: 1850 mL/day (1mL/kg rounded to 50 mL) Last BM: 11/20   [x]Active     []Hyperactive  []Hypoactive       [] Absent   BS Skin:    [x] Intact   [] Incision  [] Breakdown   [] DTI   [] Tears/Excoriation/Abrasion  [x]Edema 1+ BLE[] Other: Wt Readings from Last 30 Encounters:  
11/19/18 99.4 kg (219 lb 3.2 oz)  
11/14/18 110.2 kg (243 lb) 10/19/18 118.8 kg (261 lb 14.5 oz) 10/17/18 112.5 kg (248 lb) 10/01/18 117.7 kg (259 lb 6.4 oz) 08/31/18 111.6 kg (246 lb)  
06/30/18 110.5 kg (243 lb 9.6 oz) 03/30/18 113.4 kg (250 lb) 04/16/17 70.3 kg (155 lb) 04/16/17 70.3 kg (155 lb)  
01/16/17 112 kg (247 lb)  
07/15/16 113.4 kg (250 lb) 02/09/16 117.5 kg (259 lb)  
01/29/16 117 kg (258 lb)  
01/26/16 116.1 kg (256 lb)  
01/22/16 116.7 kg (257 lb 3.2 oz) 01/15/16 115.2 kg (254 lb) 01/12/16 115.2 kg (254 lb) 01/05/16 116.1 kg (256 lb) 12/29/15 115.2 kg (254 lb) 12/22/15 117.5 kg (259 lb) 12/18/15 117.5 kg (259 lb) 12/15/15 117.5 kg (259 lb) 12/08/15 117.5 kg (259 lb) 12/04/15 117.9 kg (260 lb) 12/01/15 117.5 kg (259 lb)  
11/24/15 117 kg (258 lb)  
11/20/15 117.5 kg (259 lb)  
11/17/15 119.3 kg (263 lb)  
11/13/15 119.7 kg (264 lb) NUTRITION DIAGNOSES:  
Problem:  Altered nutrition-related lab values Etiology: related to endocrine dysfunction Signs/Symptoms: as evidenced by , 242, 271, 246 mg/dL A1C 8.5- on steroids NUTRITION INTERVENTIONS: 
Meals/Snacks: General/healthful diet   Supplements: Commercial supplement GOAL:  
Pt will consume >50% of meals and ONS w BG <180 mg/dL within 3-5 days Cultural, Adventist, or Ethnic Dietary Needs: None LEARNING NEEDS (Diet, Food/Nutrient-Drug Interaction):  
 [x] None Identified 
 [] Identified and Education Provided/Documented 
 [] Identified and Pt declined/was not appropriate [x] Interdisciplinary Care Plan Reviewed/Documented  
 [x] Discharge Needs:    TBD [] No Nutrition Related Discharge Needs NUTRITION RISK:  
Pt Is At Nutrition Risk  [x] No Nutrition Risk Identified  [] PT SEEN FOR:  
 []  MD Consult: []Calorie Count []Diabetic Diet Education []Diet Education []Electrolyte Management []General Nutrition Management and Supplements []Management of Tube Feeding []TPN Recommendations []  RN Referral:  []MST score >=2 
   []Enteral/Parenteral Nutrition PTA []Pregnant: Gestational DM or Multigestation  
              [] Pressure Ulcer 
   
[]  Low BMI      [x]  Length of Stay       [] Dysphagia Diet     [] Ventilator      [] Follow-Up Previous Recommendations: 
 [] Implemented          [] Not Implemented          [x] Not Applicable Previous Goal: 
 [] Met              [] Progressing Towards Goal              [] Not Progressing Towards Goal   [x] Not Applicable [address] Pager: [phone] Office: [phone]

## 2018-11-27 NOTE — PERIOP NOTES
TRANSFER - OUT REPORT: 
 
Verbal report given to la rn(name) on Kala Elliott  being transferred to [facility](unit) for routine post - op Report consisted of patients Situation, Background, Assessment and  
Recommendations(SBAR). Information from the following report(s) SBAR, Procedure Summary, Intake/Output and MAR was reviewed with the receiving nurse. Lines:  
Peripheral IV 11/27/18 Right;Upper Cephalic (Active) Site Assessment Clean, dry, & intact 11/27/2018  3:54 PM  
Phlebitis Assessment 0 11/27/2018  3:54 PM  
Infiltration Assessment 0 11/27/2018  3:54 PM  
Dressing Status Clean, dry, & intact 11/27/2018  3:54 PM  
Dressing Type Tape;Transparent 11/27/2018  3:54 PM  
Hub Color/Line Status Pink 11/27/2018  3:54 PM  
  
 
Opportunity for questions and clarification was provided. Patient transported with: 
 O2 @ 2 liters Registered Nurse Tech

## 2018-11-27 NOTE — PROGRESS NOTES
Daily Progress Note: 11/27/2018 Francis Bell MD 
 
Assessment/Plan: Dyspnea (11/13/2018) improved - workup with Pul and Card. - has completed steroid course 
  
  Type II diabetes mellitus ([address] (10/9/2015) - with reported nephropathy with proteinuria without CKD 
            - continue saxagliptin and follow on SSI 
  
  Coronary artery disease involving native coronary artery without angina pectoris (1/22/2016) - stable, continue cardiac meds 
            - Hold plavix today, before kypho planned tomorrow 
  
  Essential hypertension (1/22/2016)- on low side at times- watching for now 
            - cont home meds - new parameters written 
  
  Chronic anticoagulation (3/30/2018) 
            -therapeutic lovenox, hold Monday AM for procedure 
  
  Obesity, BMI 30-39. 9(HCC) (10/1/2018) - counseled on weight loss, this is contributing to her current symptoms 
  
  ARF (acute renal failure) ([facility] (11/13/2018) - creatinine up acutely from prior, possibly due to diuresis 
            - hold diuretic  
  
  Closed wedge compression fracture of T7 vertebra ([facility] (11/13/2018) -ortho rec kypho -- scheduled for 11/26 
            - cardiology ok with holding anticoags for procedure Code status: 
            - patient is FULL CODE, no AMD on file, her daughter ST CABEZAS is her surrogate Dispo:will depend on how she does after kyphoplasty. 
   
 
Problem List: 
Problem List as of 11/27/2018 Date Reviewed: 11/13/2018 Codes Class Noted - Resolved * (Principal) Dyspnea ICD-10-CM: R06.00 
ICD-9-CM: 786.09  11/13/2018 - Present ARF (acute renal failure) (HCC) ICD-10-CM: N17.9 ICD-9-CM: 584.9  11/13/2018 - Present Obesity (BMI 30-39.9) (Chronic) ICD-10-CM: E97.7 ICD-9-CM: 278.00  11/13/2018 - Present  Closed wedge compression fracture of T7 vertebra ([facility] ICD-10-CM: S22.060A ICD-9-CM: 805.2  11/13/2018 - Present Type 2 diabetes with nephropathy ([facility] ICD-10-CM: E11.21 
ICD-9-CM: 250.40, 583.81  10/1/2018 - Present Chest pain ICD-10-CM: R07.9 ICD-9-CM: 786.50  6/27/2018 - Present Generalized abdominal pain ICD-10-CM: R10.84 ICD-9-CM: 789.07  6/27/2018 - Present Recurrent deep vein thrombosis (DVT) (HCC) ICD-10-CM: I82.409 ICD-9-CM: 453.40  3/30/2018 - Present Chronic anticoagulation (Chronic) ICD-10-CM: Z79.01 
ICD-9-CM: V58.61  3/30/2018 - Present Coronary artery disease involving native coronary artery without angina pectoris (Chronic) ICD-10-CM: I25.10 ICD-9-CM: 414.01  1/22/2016 - Present Essential hypertension (Chronic) ICD-10-CM: I10 
ICD-9-CM: 401.9  1/22/2016 - Present Type II diabetes mellitus (HCC) (Chronic) ICD-10-CM: E11.9 ICD-9-CM: 250.00  10/9/2015 - Present NSTEMI (non-ST elevated myocardial infarction) ([facility] ICD-10-CM: I21.4 ICD-9-CM: 410.70  10/9/2015 - Present RESOLVED: CAD (coronary artery disease) ICD-10-CM: I25.10 ICD-9-CM: 414.00  10/9/2015 - 1/22/2016 RESOLVED: HTN (hypertension) ICD-10-CM: I10 
ICD-9-CM: 401.9  10/9/2015 - 1/22/2016 Subjective:  
  81 y.o.  female who is admitted with SOB. Ms. GONZÁLES presented to the Emergency Department this PM complaining of SOB: for the past 3 months, worked up by Pulmonary and Cardiology as an outpatient without any definite diagnosis, not hypoxic, associated with back pain, unable to take a deep breath without pain, back pain started at the same time as the SOB. History obtained from chart review and the patient. Previous records reviewed, recent admit for chest pain. (Dr Marisela Jackman). 11/14:  Feels much better this AM with less back pain on meds. Still very tender over site with any palpation. Less air hunger but still \"not breathing back to normal yet. \"  Glucoses up with the steroids.  Pul and Card seeing also. 11/15:    Reports she continues to feel better and reports no longer SOB. She is not moving much due to back pain. No chest pains. Ortho to decide next step for compression fx. No complaints of weakness or numbness LEs. 
 
11/16:  No longer short of breath - will wean steroids to po. IR plans kyphoplasty today. May benefit from Prolia for osteoporosis outpt  
 
11/17/18: Transferred to ICU yesterday for A-fib w/ RVR. Converted after Dilt bolus. Now sinus tach in the 100s. Coreg increased this am. She is not requiring a lot of pain meds but also not moving a lot. Xarelto being held and she is on Lovenox now. 11/18/18: Transferred out of ICU yesterday. Having pain with any movement. Dr Rusty Pratt would like her to stay on Plavix but is ok holding Xarelto/Lovenox for kyphoplasty. Will need to see if IR can do kyphoplasty on Plavix. Ortho getting her fitted for brace. Breathing well. No SOB. 11/19:  Little pain unless she moves about. Kyphoplasty planned for today. 11/20/18: Kyphoplasty now planned for Monday after holding Plavix. She would like to go home and return on Monday as an outpatient for the procedure. Was able to sit in chair yesterday. 11/22:  Pt was seen by PT yesterday. Rec that she needs SNF or rehab. She wants to stay here until MOnday when she gets kyphoplasty at [facility]. The logistics of this have not been coordinated yet. Will wait until tomorrow when IR is open to further figure out her transfer. Tolerating PO, +BM. 11/23: Dr Marin Darpj to do kyphoplasty today at [address] notes she has limited IV access for lab draws. Hopefully will be able to move toward discharge thanks to today's procedure. Will reevaluate her need for IV after procedure. I don't want to get a central line if she will be discharged tomorrow. Pt eager to get the procedure done. Her preference would be to go home with [place] PT. Tolerated PO yesterday, NPO this AM. : kyphoplasty was cancelled yesterday due to staffing issues. Rescheduled for Monday at 2pm, here at [facility] by Dr Nohelia Villalobos. PT unwilling to work with PT yesterday due to pain. Ongoing nausea and hypotensive yesterday after IV compazine. LImited IV access, although she doesn't really need it. Will try oral zofran now. : No AM labs for 2d as pt is difficult stick and has poor IV access. THis is ok with me as she is awaiting placement after her kyphoplasty. BPs on the low side again last night. Will leave parameters for her BP meds. PT still refusing to get up with PT because \"I was told not to move and to be on complete bed rest by the IR doctor. \"  Does note she has a HA today. :  No complaints other than back pain. Kyphoplasty again planned for later today. K+ 2.6 and Mag 1.3 this AM - replacing. Stable to have kyphoplasty today. :  No complaints other than back pain. Kyphoplasty again postponed yesterday - attempting to get it done again today. AM labs pending. BP was low yesterday but responded to fluids - never was in any distress - was likely a little dehydrated and from lying constantly in bed. Very difficult to get IV sites/ labs from her - AM labs pending - need to at least get lab for K+ and Mg+ - other RNs will try this AM.  
  
Review of Systems: A comprehensive review of systems was negative except for that written in the HPI. Objective:  
Physical Exam:  
 
Visit Vitals BP 92/53 (BP 1 Location: Right arm, BP Patient Position: At rest) Pulse 98 Temp 98.3 °F (36.8 °C) Resp 18 Ht 5' 7\" (1.702 m) Wt 99.4 kg (219 lb 3.2 oz) SpO2 98% Breastfeeding? No  
BMI 35.38 kg/m² O2 Flow Rate (L/min): 2 l/min O2 Device: Nasal cannula Temp (24hrs), Av.8 °F (36.6 °C), Min:97.3 °F (36.3 °C), Max:98.5 °F (36.9 °C) No intake/output data recorded. 701 - 1900 In: 680 [P.O.:680] Out: 400 [Urine:400] General:  cooperative, obese, no distress, appears stated age. Head:  Normocephalic, without obvious abnormality, atraumatic. Eyes:  Conjunctivae/corneas clear. PERRL, EOMs intact. Nose: NC O2 in place. Throat: Lips, mucosa, and tongue moist.  
Neck: Supple, symmetrical, trachea midline, no adenopathy, thyroid: no enlargement/tenderness/nodules, no carotid bruit and no JVD. Back:   Symmetric,with marked tenderness to palpation over T6-7-8 area. Lungs:   Diminished BS bilaterally but otherwise clear at this time. Chest wall:  No tenderness or deformity. Heart:  Irregular rate and rhythm, no murmur, click, rub or gallop. Abdomen:   Soft, non-tender. Bowel sounds normal. No masses,  No organomegaly. Extremities: no cyanosis. Trace LE edema. No calf tenderness or cords. Skin:  turgor normal.  bruising on arms sl worse Neurologic: CNII-XII intact. Alert and oriented X 3. Fine motor of hands and fingers normal.   equal.  Gait not tested at this time. Sensation grossly normal to touch. Gross motor of extremities normal.    
 
Data Review:  
  CT of Chest 11/13/18 FINDINGS: 
THYROID: No nodule. MEDIASTINUM: No mass or lymphadenopathy. SONJA: No mass or lymphadenopathy. THORACIC AORTA: No aneurysm. Mild vascular calcifications MAIN PULMONARY ARTERY: Normal in caliber. TRACHEA/BRONCHI: Patent. ESOPHAGUS: No wall thickening or dilatation. Small hiatal hernia HEART: Normal in size. PLEURA: No effusion or pneumothorax. LUNGS: No nodule, mass, or airspace disease. Bilateral atelectatic changes. INCIDENTALLY IMAGED UPPER ABDOMEN: No focal abnormality. BONES: Bones are osteopenic. There is collapse of the superior endplate of T7. This has occurred since June 2018 Possible milk of calcium cyst within the right breast 
IMPRESSION: 
1. No acute cardiopulmonary disease 2. Interval collapse superior endplate of T7. The patient has back pain this may be acute to subacute and MR imaging would better characterize 
  
Recent Days: 
Recent Labs  
  11/26/18 
0326 WBC 8.9 HGB 12.6 HCT 39.5  Recent Labs  
  11/26/18 
0326   
K 2.6*  
 CO2 22 * BUN 28* CREA 1.38* CA 8.7 MG 1.3* No results for input(s): PH, PCO2, PO2, HCO3, FIO2 in the last 72 hours. 24 Hour Results: 
Recent Results (from the past 24 hour(s)) GLUCOSE, POC Collection Time: 11/26/18  7:20 AM  
Result Value Ref Range Glucose (POC) 178 (H) 65 - 100 mg/dL Performed by Charlie Farris (PCT) GLUCOSE, POC Collection Time: 11/26/18 12:29 PM  
Result Value Ref Range Glucose (POC) 163 (H) 65 - 100 mg/dL Performed by Adarsh Gonsales (PCT) GLUCOSE, POC Collection Time: 11/26/18  5:14 PM  
Result Value Ref Range Glucose (POC) 159 (H) 65 - 100 mg/dL Performed by Adarsh Gonsales (PCT) GLUCOSE, POC Collection Time: 11/26/18  9:16 PM  
Result Value Ref Range Glucose (POC) 156 (H) 65 - 100 mg/dL Performed by Jayashree Barrera (PCT) Medications reviewed Current Facility-Administered Medications Medication Dose Route Frequency  magnesium oxide (MAG-OX) tablet 400 mg  400 mg Oral BID  
 0.9% sodium chloride infusion  100 mL/hr IntraVENous CONTINUOUS  
 ondansetron (ZOFRAN ODT) tablet 4 mg  4 mg Oral Q8H PRN  
 enoxaparin (LOVENOX) injection 40 mg  40 mg SubCUTAneous Q24H  calcitonin (salmon) (MIACALCIN) nasal 1 Spray  1 [place] IntraNASal DAILY  carvedilol (COREG) tablet 25 mg  25 mg Oral BID WITH MEALS  
 arformoterol (BROVANA) neb solution 15 mcg  15 mcg Nebulization BID RT  
 budesonide (PULMICORT) 500 mcg/2 ml nebulizer suspension  500 mcg Nebulization BID RT  
 albuterol-ipratropium (DUO-NEB) 2.5 MG-0.5 MG/3 ML  3 mL Nebulization Q6H RT  
 DULoxetine (CYMBALTA) capsule 60 mg  60 mg Oral DAILY  isosorbide mononitrate ER (IMDUR) tablet 30 mg  30 mg Oral DAILY  lisinopril (PRINIVIL, ZESTRIL) tablet 5 mg  5 mg Oral DAILY  montelukast (SINGULAIR) tablet 10 mg  10 mg Oral DAILY  sAXagliptin (ONGLYZA) tablet 5 mg  5 mg Oral DAILY  sodium chloride (NS) flush 5-10 mL  5-10 mL IntraVENous Q8H  
 sodium chloride (NS) flush 5-10 mL  5-10 mL IntraVENous PRN  
 acetaminophen (TYLENOL) tablet 650 mg  650 mg Oral Q4H PRN  
 oxyCODONE-acetaminophen (PERCOCET) 5-325 mg per tablet 1 Tab  1 Tab Oral Q4H PRN  
 HYDROmorphone (PF) (DILAUDID) injection 0.5 mg  0.5 mg IntraVENous Q4H PRN  
 zolpidem (AMBIEN) tablet 5 mg  5 mg Oral QHS PRN  
 insulin lispro (HUMALOG) injection   SubCUTAneous QID WITH MEALS  glucose chewable tablet 16 g  4 Tab Oral PRN  
 dextrose (D50W) injection syrg 12.5-25 g  25-50 mL IntraVENous PRN  
 glucagon (GLUCAGEN) injection 1 mg  1 mg IntraMUSCular PRN  pantoprazole (PROTONIX) tablet 40 mg  40 mg Oral ACB&D Care Plan discussed with: Patient and Nurse Total time spent with patient: 30 minutes.  
Deion Riley MD

## 2018-11-27 NOTE — ROUTINE PROCESS
Bedside shift change report given to Soledad (oncoming nurse) by Cedric Maloney (offgoing nurse). Report included the following information SBAR, Kardex, Procedure Summary, Intake/Output and MAR.

## 2018-11-27 NOTE — ANESTHESIA POSTPROCEDURE EVALUATION
* No procedures listed *. Anesthesia Post Evaluation Multimodal analgesia: multimodal analgesia used between 6 hours prior to anesthesia start to PACU discharge Patient location during evaluation: bedside Patient participation: complete - patient participated Level of consciousness: awake Pain management: adequate Airway patency: patent Anesthetic complications: no 
Cardiovascular status: acceptable Respiratory status: acceptable Hydration status: acceptable Visit Vitals BP 95/49 (BP 1 Location: Left leg, BP Patient Position: At rest) Pulse 88 Temp 36.3 °C (97.4 °F) Resp 16 Ht 5' 7\" (1.702 m) Wt 99.4 kg (219 lb 3.2 oz) SpO2 95% Breastfeeding? No  
BMI 35.38 kg/m²

## 2018-11-27 NOTE — PROGRESS NOTES
Physical Therapy Note: 
Pt off flr for kyphoplasty procedure. Will continue to follow with MD order clarification/clearance Hussein Schultz PT.

## 2018-11-27 NOTE — PERIOP NOTES
Dr. Maryann Bence in and said pt was able to have fluid,IV and that she could go back to fifth floor. BP in left leg 20mmhg higher than left arm 101-120mmgh

## 2018-11-27 NOTE — PERIOP NOTES
Dr Swained Level into see pt. , dr Michelle Plasencia in and put jimenez on hold till pt seen by the intensivist.

## 2018-11-28 LAB
ANION GAP SERPL CALC-SCNC: 14 MMOL/L (ref 5–15)
BASOPHILS # BLD: 0 K/UL (ref 0–0.1)
BASOPHILS NFR BLD: 0 % (ref 0–1)
BUN SERPL-MCNC: 25 MG/DL (ref 6–20)
BUN/CREAT SERPL: 19 (ref 12–20)
CALCIUM SERPL-MCNC: 7.8 MG/DL (ref 8.5–10.1)
CHLORIDE SERPL-SCNC: 109 MMOL/L (ref 97–108)
CO2 SERPL-SCNC: 19 MMOL/L (ref 21–32)
CREAT SERPL-MCNC: 1.3 MG/DL (ref 0.55–1.02)
DIFFERENTIAL METHOD BLD: ABNORMAL
EOSINOPHIL # BLD: 0.1 K/UL (ref 0–0.4)
EOSINOPHIL NFR BLD: 1 % (ref 0–7)
ERYTHROCYTE [DISTWIDTH] IN BLOOD BY AUTOMATED COUNT: 15.4 % (ref 11.5–14.5)
GLUCOSE BLD STRIP.AUTO-MCNC: 102 MG/DL (ref 65–100)
GLUCOSE BLD STRIP.AUTO-MCNC: 114 MG/DL (ref 65–100)
GLUCOSE BLD STRIP.AUTO-MCNC: 126 MG/DL (ref 65–100)
GLUCOSE BLD STRIP.AUTO-MCNC: 129 MG/DL (ref 65–100)
GLUCOSE SERPL-MCNC: 163 MG/DL (ref 65–100)
HCT VFR BLD AUTO: 32.5 % (ref 35–47)
HGB BLD-MCNC: 10.3 G/DL (ref 11.5–16)
IMM GRANULOCYTES # BLD: 0.1 K/UL (ref 0–0.04)
IMM GRANULOCYTES NFR BLD AUTO: 2 % (ref 0–0.5)
LYMPHOCYTES # BLD: 0.7 K/UL (ref 0.8–3.5)
LYMPHOCYTES NFR BLD: 10 % (ref 12–49)
MAGNESIUM SERPL-MCNC: 1.4 MG/DL (ref 1.6–2.4)
MCH RBC QN AUTO: 29.4 PG (ref 26–34)
MCHC RBC AUTO-ENTMCNC: 31.7 G/DL (ref 30–36.5)
MCV RBC AUTO: 92.9 FL (ref 80–99)
MONOCYTES # BLD: 0.5 K/UL (ref 0–1)
MONOCYTES NFR BLD: 8 % (ref 5–13)
NEUTS SEG # BLD: 5.1 K/UL (ref 1.8–8)
NEUTS SEG NFR BLD: 79 % (ref 32–75)
NRBC # BLD: 0 K/UL (ref 0–0.01)
NRBC BLD-RTO: 0 PER 100 WBC
PLATELET # BLD AUTO: 149 K/UL (ref 150–400)
PMV BLD AUTO: 10.5 FL (ref 8.9–12.9)
POTASSIUM SERPL-SCNC: 3.6 MMOL/L (ref 3.5–5.1)
RBC # BLD AUTO: 3.5 M/UL (ref 3.8–5.2)
RBC MORPH BLD: ABNORMAL
SERVICE CMNT-IMP: ABNORMAL
SODIUM SERPL-SCNC: 142 MMOL/L (ref 136–145)
WBC # BLD AUTO: 6.5 K/UL (ref 3.6–11)

## 2018-11-28 PROCEDURE — 74011250637 HC RX REV CODE- 250/637: Performed by: FAMILY MEDICINE

## 2018-11-28 PROCEDURE — 94760 N-INVAS EAR/PLS OXIMETRY 1: CPT

## 2018-11-28 PROCEDURE — 83735 ASSAY OF MAGNESIUM: CPT

## 2018-11-28 PROCEDURE — 97530 THERAPEUTIC ACTIVITIES: CPT

## 2018-11-28 PROCEDURE — 94660 CPAP INITIATION&MGMT: CPT

## 2018-11-28 PROCEDURE — 94640 AIRWAY INHALATION TREATMENT: CPT

## 2018-11-28 PROCEDURE — 74011000250 HC RX REV CODE- 250: Performed by: INTERNAL MEDICINE

## 2018-11-28 PROCEDURE — 85025 COMPLETE CBC W/AUTO DIFF WBC: CPT

## 2018-11-28 PROCEDURE — 74011250637 HC RX REV CODE- 250/637: Performed by: INTERNAL MEDICINE

## 2018-11-28 PROCEDURE — 74011250636 HC RX REV CODE- 250/636: Performed by: FAMILY MEDICINE

## 2018-11-28 PROCEDURE — 80048 BASIC METABOLIC PNL TOTAL CA: CPT

## 2018-11-28 PROCEDURE — 82962 GLUCOSE BLOOD TEST: CPT

## 2018-11-28 PROCEDURE — 36415 COLL VENOUS BLD VENIPUNCTURE: CPT

## 2018-11-28 PROCEDURE — 77030038269 HC DRN EXT URIN PURWCK BARD -A

## 2018-11-28 PROCEDURE — 77010033678 HC OXYGEN DAILY

## 2018-11-28 PROCEDURE — 65270000029 HC RM PRIVATE

## 2018-11-28 RX ORDER — CARVEDILOL 12.5 MG/1
12.5 TABLET ORAL 2 TIMES DAILY WITH MEALS
Status: DISCONTINUED | OUTPATIENT
Start: 2018-11-28 | End: 2018-12-03

## 2018-11-28 RX ORDER — CLOPIDOGREL BISULFATE 75 MG/1
75 TABLET ORAL DAILY
Status: DISCONTINUED | OUTPATIENT
Start: 2018-11-28 | End: 2018-12-03 | Stop reason: HOSPADM

## 2018-11-28 RX ADMIN — RIVAROXABAN 20 MG: 20 TABLET, FILM COATED ORAL at 17:45

## 2018-11-28 RX ADMIN — MONTELUKAST SODIUM 10 MG: 10 TABLET, COATED ORAL at 08:08

## 2018-11-28 RX ADMIN — ARFORMOTEROL TARTRATE 15 MCG: 15 SOLUTION RESPIRATORY (INHALATION) at 08:49

## 2018-11-28 RX ADMIN — PANTOPRAZOLE SODIUM 40 MG: 40 TABLET, DELAYED RELEASE ORAL at 17:45

## 2018-11-28 RX ADMIN — CALCITONIN SALMON 1 SPRAY: 200 SPRAY, METERED NASAL at 08:12

## 2018-11-28 RX ADMIN — POTASSIUM CHLORIDE 10 MEQ: 750 TABLET, EXTENDED RELEASE ORAL at 08:08

## 2018-11-28 RX ADMIN — CARVEDILOL 12.5 MG: 12.5 TABLET, FILM COATED ORAL at 08:08

## 2018-11-28 RX ADMIN — Medication 400 MG: at 17:45

## 2018-11-28 RX ADMIN — IPRATROPIUM BROMIDE AND ALBUTEROL SULFATE 3 ML: .5; 3 SOLUTION RESPIRATORY (INHALATION) at 20:58

## 2018-11-28 RX ADMIN — Medication 10 ML: at 06:00

## 2018-11-28 RX ADMIN — BUDESONIDE 500 MCG: 0.5 INHALANT RESPIRATORY (INHALATION) at 08:50

## 2018-11-28 RX ADMIN — POTASSIUM CHLORIDE 10 MEQ: 750 TABLET, EXTENDED RELEASE ORAL at 17:45

## 2018-11-28 RX ADMIN — ENOXAPARIN SODIUM 40 MG: 40 INJECTION SUBCUTANEOUS at 11:16

## 2018-11-28 RX ADMIN — PANTOPRAZOLE SODIUM 40 MG: 40 TABLET, DELAYED RELEASE ORAL at 06:49

## 2018-11-28 RX ADMIN — SAXAGLIPTIN 5 MG: 5 TABLET, FILM COATED ORAL at 08:12

## 2018-11-28 RX ADMIN — BUDESONIDE 500 MCG: 0.5 INHALANT RESPIRATORY (INHALATION) at 20:58

## 2018-11-28 RX ADMIN — Medication 10 ML: at 22:00

## 2018-11-28 RX ADMIN — OXYCODONE AND ACETAMINOPHEN 1 TABLET: 5; 325 TABLET ORAL at 17:53

## 2018-11-28 RX ADMIN — Medication 400 MG: at 08:08

## 2018-11-28 RX ADMIN — CLOPIDOGREL BISULFATE 75 MG: 75 TABLET ORAL at 08:08

## 2018-11-28 RX ADMIN — CARVEDILOL 12.5 MG: 12.5 TABLET, FILM COATED ORAL at 17:51

## 2018-11-28 RX ADMIN — ISOSORBIDE MONONITRATE 30 MG: 30 TABLET, EXTENDED RELEASE ORAL at 08:08

## 2018-11-28 RX ADMIN — DULOXETINE 60 MG: 30 CAPSULE, DELAYED RELEASE ORAL at 08:08

## 2018-11-28 NOTE — ROUTINE PROCESS
Bedside shift change report given to [facility] (oncoming nurse) by Bree Jara (offgoing nurse). Report included the following information SBAR.

## 2018-11-28 NOTE — PROGRESS NOTES
11/28/18 1510 Vital Signs BP (!) 60/40 MAP (Calculated) (!) 47 Called Dr. Emeka Maldonado at [phone] about patient's BP. Pt is asymptomatic. He stated no intervention needed at the moment. Will continue to monitor.

## 2018-11-28 NOTE — PROGRESS NOTES
Daily Progress Note: 11/28/2018 Dolly Mckinnon MD 
 
Assessment/Plan: Dyspnea (11/13/2018) improved - workup with Pul and Card. - has completed steroid course 
  
  Type II diabetes mellitus ([facility] (10/9/2015) - with reported nephropathy with proteinuria without CKD 
            - continue saxagliptin and follow on SSI 
  
  Coronary artery disease involving native coronary artery without angina pectoris (1/22/2016) - stable, continue cardiac meds 
             
   Essential hypertension (1/22/2016)- on low side at times 
            - decrease Coreg to 12.5mg BID 
  
  Chronic anticoagulation (3/30/2018) 
            -therapeutic lovenox,  
  
  Obesity, BMI 30-39. 9(HCC) (10/1/2018) - counseled on weight loss, this is contributing to her current symptoms 
  
  ARF (acute renal failure) ([facility] (11/13/2018) - creatinine up acutely from prior, possibly due to diuresis 
            - holding diuretic  
  
  Closed wedge compression fracture of T7 vertebra ([facility] (11/13/2018) - kypho 11/27 Code status: 
            - patient is FULL CODE, no AMD on file, her daughter Dominga Posada is her surrogate Dispo:will depend on how she does after kyphoplasty. 
   
 
Problem List: 
Problem List as of 11/28/2018 Date Reviewed: 11/27/2018 Codes Class Noted - Resolved * (Principal) Dyspnea ICD-10-CM: R06.00 
ICD-9-CM: 786.09  11/13/2018 - Present ARF (acute renal failure) (HCC) ICD-10-CM: N17.9 ICD-9-CM: 584.9  11/13/2018 - Present Obesity (BMI 30-39.9) (Chronic) ICD-10-CM: F35.9 ICD-9-CM: 278.00  11/13/2018 - Present Closed wedge compression fracture of T7 vertebra ([facility] ICD-10-CM: T60.751W ICD-9-CM: 805.2  11/13/2018 - Present Type 2 diabetes with nephropathy ([facility] ICD-10-CM: E11.21 
ICD-9-CM: 250.40, 583.81  10/1/2018 - Present Chest pain ICD-10-CM: R07.9 ICD-9-CM: 786.50  6/27/2018 - Present Generalized abdominal pain ICD-10-CM: R10.84 ICD-9-CM: 789.07  6/27/2018 - Present Recurrent deep vein thrombosis (DVT) (HCC) ICD-10-CM: I82.409 ICD-9-CM: 453.40  3/30/2018 - Present Chronic anticoagulation (Chronic) ICD-10-CM: Z79.01 
ICD-9-CM: V58.61  3/30/2018 - Present Coronary artery disease involving native coronary artery without angina pectoris (Chronic) ICD-10-CM: I25.10 ICD-9-CM: 414.01  1/22/2016 - Present Essential hypertension (Chronic) ICD-10-CM: I10 
ICD-9-CM: 401.9  1/22/2016 - Present Type II diabetes mellitus (HCC) (Chronic) ICD-10-CM: E11.9 ICD-9-CM: 250.00  10/9/2015 - Present NSTEMI (non-ST elevated myocardial infarction) ([facility] ICD-10-CM: I21.4 ICD-9-CM: 410.70  10/9/2015 - Present RESOLVED: CAD (coronary artery disease) ICD-10-CM: I25.10 ICD-9-CM: 414.00  10/9/2015 - 1/22/2016 RESOLVED: HTN (hypertension) ICD-10-CM: I10 
ICD-9-CM: 401.9  10/9/2015 - 1/22/2016 Subjective:  
  81 y.o.  female who is admitted with SOB. Ms. Naida Pacheco presented to the Emergency Department this PM complaining of SOB: for the past 3 months, worked up by Pulmonary and Cardiology as an outpatient without any definite diagnosis, not hypoxic, associated with back pain, unable to take a deep breath without pain, back pain started at the same time as the SOB. History obtained from chart review and the patient. Previous records reviewed, recent admit for chest pain. (Dr Mago Slater). 11/14:  Feels much better this AM with less back pain on meds. Still very tender over site with any palpation. Less air hunger but still \"not breathing back to normal yet. \"  Glucoses up with the steroids. Pul and Card seeing also. 11/15:    Reports she continues to feel better and reports no longer SOB. She is not moving much due to back pain. No chest pains.   Ortho to decide next step for compression fx. No complaints of weakness or numbness LEs. 
 
11/16:  No longer short of breath - will wean steroids to po. IR plans kyphoplasty today. May benefit from Prolia for osteoporosis outpt  
 
11/17/18: Transferred to ICU yesterday for A-fib w/ RVR. Converted after Dilt bolus. Now sinus tach in the 100s. Coreg increased this am. She is not requiring a lot of pain meds but also not moving a lot. Xarelto being held and she is on Lovenox now. 11/18/18: Transferred out of ICU yesterday. Having pain with any movement. Dr Barry Cardona would like her to stay on Plavix but is ok holding Xarelto/Lovenox for kyphoplasty. Will need to see if IR can do kyphoplasty on Plavix. Ortho getting her fitted for brace. Breathing well. No SOB. 11/19:  Little pain unless she moves about. Kyphoplasty planned for today. 11/20/18: Kyphoplasty now planned for Monday after holding Plavix. She would like to go home and return on Monday as an outpatient for the procedure. Was able to sit in chair yesterday. 11/22:  Pt was seen by PT yesterday. Rec that she needs SNF or rehab. She wants to stay here until MOnday when she gets kyphoplasty at [facility]. The logistics of this have not been coordinated yet. Will wait until tomorrow when IR is open to further figure out her transfer. Tolerating PO, +BM. 11/23: Dr Diallo Bernard to do kyphoplasty today at [address] notes she has limited IV access for lab draws. Hopefully will be able to move toward discharge thanks to today's procedure. Will reevaluate her need for IV after procedure. I don't want to get a central line if she will be discharged tomorrow. Pt eager to get the procedure done. Her preference would be to go home with [facility] PT. Tolerated PO yesterday, NPO this AM. 
 
11/24: kyphoplasty was cancelled yesterday due to staffing issues. Rescheduled for Monday at 2pm, here at [facility] by Dr Diallo Bernard.   PT unwilling to work with PT yesterday due to pain. Ongoing nausea and hypotensive yesterday after IV compazine. LImited IV access, although she doesn't really need it. Will try oral zofran now. : No AM labs for 2d as pt is difficult stick and has poor IV access. THis is ok with me as she is awaiting placement after her kyphoplasty. BPs on the low side again last night. Will leave parameters for her BP meds. PT still refusing to get up with PT because \"I was told not to move and to be on complete bed rest by the IR doctor. \"  Does note she has a HA today. :  No complaints other than back pain. Kyphoplasty again planned for later today. K+ 2.6 and Mag 1.3 this AM - replacing. Stable to have kyphoplasty today. :  No complaints other than back pain. Kyphoplasty again postponed yesterday - attempting to get it done again today. AM labs pending. BP was low yesterday but responded to fluids - never was in any distress - was likely a little dehydrated and from lying constantly in bed. Very difficult to get IV sites/ labs from her - AM labs pending - need to at least get lab for K+ and Mg+ - other RNs will try this AM.  
 
: Had kypho yesterday. Feels much better with less pain. BP on the low side so will decrease Coreg. Hopefully can work with PT. She lives alone so would benefit from rehab. She has been at [address] [place] [address] in the past. Restart Plavix.  
  
Review of Systems: A comprehensive review of systems was negative except for that written in the HPI. Objective:  
Physical Exam:  
 
Visit Vitals /69 (BP 1 Location: Right arm, BP Patient Position: At rest) Pulse 94 Temp 97.7 °F (36.5 °C) Resp 16 Ht 5' 7\" (1.702 m) Wt 99.4 kg (219 lb 3.2 oz) SpO2 98% Breastfeeding? No  
BMI 35.38 kg/m² O2 Flow Rate (L/min): 2 l/min O2 Device: Nasal cannula Temp (24hrs), Av.7 °F (36.5 °C), Min:97.4 °F (36.3 °C), Max:98.2 °F (36.8 °C) No intake/output data recorded. 11/26 0701 - 11/27 1900 In: 2027 [P.O.:200; I.V.:1250] Out: 550 [Urine:550] General:  cooperative, obese, no distress, appears stated age. Head:  Normocephalic, without obvious abnormality, atraumatic. Eyes:  Conjunctivae/corneas clear. PERRL, EOMs intact. Nose: NC O2 in place. Throat: Lips, mucosa, and tongue moist.  
Neck: Supple, symmetrical, trachea midline, no adenopathy, thyroid: no enlargement/tenderness/nodules, no carotid bruit and no JVD. Back:   Symmetric,with marked tenderness to palpation over T6-7-8 area. Lungs:   Diminished BS bilaterally but otherwise clear at this time. Chest wall:  No tenderness or deformity. Heart:  Irregular rate and rhythm, no murmur, click, rub or gallop. Abdomen:   Soft, non-tender. Bowel sounds normal. No masses,  No organomegaly. Extremities: no cyanosis. Trace LE edema. No calf tenderness or cords. Skin:  turgor normal.  bruising on arms sl worse Neurologic: CNII-XII intact. Alert and oriented X 3. Fine motor of hands and fingers normal.   equal.  Gait not tested at this time. Sensation grossly normal to touch. Gross motor of extremities normal.    
 
Data Review:  
  CT of Chest 11/13/18 FINDINGS: 
THYROID: No nodule. MEDIASTINUM: No mass or lymphadenopathy. SONJA: No mass or lymphadenopathy. THORACIC AORTA: No aneurysm. Mild vascular calcifications MAIN PULMONARY ARTERY: Normal in caliber. TRACHEA/BRONCHI: Patent. ESOPHAGUS: No wall thickening or dilatation. Small hiatal hernia HEART: Normal in size. PLEURA: No effusion or pneumothorax. LUNGS: No nodule, mass, or airspace disease. Bilateral atelectatic changes. INCIDENTALLY IMAGED UPPER ABDOMEN: No focal abnormality. BONES: Bones are osteopenic. There is collapse of the superior endplate of T7. This has occurred since June 2018 Possible milk of calcium cyst within the right breast 
IMPRESSION: 
1. No acute cardiopulmonary disease 2. Interval collapse superior endplate of T7. The patient has back pain this may 
be acute to subacute and MR imaging would better characterize 
  
Recent Days: 
Recent Labs  
  11/28/18 
0249 11/27/18 0523 11/26/18 
0326 WBC 6.5 6.5 8.9 HGB 10.3* 11.1* 12.6 HCT 32.5* 34.8* 39.5 * 157 164 Recent Labs  
  11/28/18 
0249 11/27/18 
0523 11/26/18 
0326  140 137  
K 3.6 3.4* 2.6*  
* 107 102 CO2 19* 20* 22 * 117* 129* BUN 25* 26* 28* CREA 1.30* 1.71* 1.38* CA 7.8* 8.4* 8.7 MG 1.4* 1.5* 1.3* No results for input(s): PH, PCO2, PO2, HCO3, FIO2 in the last 72 hours. 24 Hour Results: 
Recent Results (from the past 24 hour(s)) GLUCOSE, POC Collection Time: 11/27/18  7:13 AM  
Result Value Ref Range Glucose (POC) 105 (H) 65 - 100 mg/dL Performed by Joanna Cunningham (PCT) GLUCOSE, POC Collection Time: 11/27/18 11:39 AM  
Result Value Ref Range Glucose (POC) 123 (H) 65 - 100 mg/dL Performed by Bianka Puri GLUCOSE, POC Collection Time: 11/27/18  3:41 PM  
Result Value Ref Range Glucose (POC) 135 (H) 65 - 100 mg/dL Performed by Venkatesh Cordero GLUCOSE, POC Collection Time: 11/27/18  5:59 PM  
Result Value Ref Range Glucose (POC) 152 (H) 65 - 100 mg/dL Performed by Pennie Luevano (PCT) GLUCOSE, POC Collection Time: 11/27/18  9:06 PM  
Result Value Ref Range Glucose (POC) 198 (H) 65 - 100 mg/dL Performed by Joanna Cunningham (PCT) MAGNESIUM Collection Time: 11/28/18  2:49 AM  
Result Value Ref Range Magnesium 1.4 (L) 1.6 - 2.4 mg/dL CBC WITH AUTOMATED DIFF Collection Time: 11/28/18  2:49 AM  
Result Value Ref Range WBC 6.5 3.6 - 11.0 K/uL  
 RBC 3.50 (L) 3.80 - 5.20 M/uL  
 HGB 10.3 (L) 11.5 - 16.0 g/dL HCT 32.5 (L) 35.0 - 47.0 % MCV 92.9 80.0 - 99.0 FL  
 MCH 29.4 26.0 - 34.0 PG  
 MCHC 31.7 30.0 - 36.5 g/dL  
 RDW 15.4 (H) 11.5 - 14.5 % PLATELET 329 (L) 346 - 400 K/uL  MPV 10.5 8.9 - 12.9 FL  
 NRBC 0.0 0  WBC ABSOLUTE NRBC 0.00 0.00 - 0.01 K/uL NEUTROPHILS 79 (H) 32 - 75 % LYMPHOCYTES 10 (L) 12 - 49 % MONOCYTES 8 5 - 13 % EOSINOPHILS 1 0 - 7 % BASOPHILS 0 0 - 1 % IMMATURE GRANULOCYTES 2 (H) 0.0 - 0.5 % ABS. NEUTROPHILS 5.1 1.8 - 8.0 K/UL  
 ABS. LYMPHOCYTES 0.7 (L) 0.8 - 3.5 K/UL  
 ABS. MONOCYTES 0.5 0.0 - 1.0 K/UL  
 ABS. EOSINOPHILS 0.1 0.0 - 0.4 K/UL  
 ABS. BASOPHILS 0.0 0.0 - 0.1 K/UL  
 ABS. IMM. GRANS. 0.1 (H) 0.00 - 0.04 K/UL  
 DF SMEAR SCANNED    
 RBC COMMENTS NORMOCYTIC, NORMOCHROMIC METABOLIC PANEL, BASIC Collection Time: 11/28/18  2:49 AM  
Result Value Ref Range Sodium 142 136 - 145 mmol/L Potassium 3.6 3.5 - 5.1 mmol/L Chloride 109 (H) 97 - 108 mmol/L  
 CO2 19 (L) 21 - 32 mmol/L Anion gap 14 5 - 15 mmol/L Glucose 163 (H) 65 - 100 mg/dL BUN 25 (H) 6 - 20 MG/DL Creatinine 1.30 (H) 0.55 - 1.02 MG/DL  
 BUN/Creatinine ratio 19 12 - 20 GFR est AA 49 (L) >60 ml/min/1.73m2 GFR est non-AA 40 (L) >60 ml/min/1.73m2 Calcium 7.8 (L) 8.5 - 10.1 MG/DL Medications reviewed Current Facility-Administered Medications Medication Dose Route Frequency  potassium chloride SR (KLOR-CON 10) tablet 10 mEq  10 mEq Oral BID  PHENYLephrine (IVÁN-SYNEPHRINE) 30 mg in 0.9% sodium chloride 250 mL infusion   mcg/min IntraVENous TITRATE  magnesium oxide (MAG-OX) tablet 400 mg  400 mg Oral BID  
 0.9% sodium chloride infusion  100 mL/hr IntraVENous CONTINUOUS  
 ondansetron (ZOFRAN ODT) tablet 4 mg  4 mg Oral Q8H PRN  
 enoxaparin (LOVENOX) injection 40 mg  40 mg SubCUTAneous Q24H  calcitonin (salmon) (MIACALCIN) nasal 1 Spray  1 [place] IntraNASal DAILY  carvedilol (COREG) tablet 25 mg  25 mg Oral BID WITH MEALS  
 arformoterol (BROVANA) neb solution 15 mcg  15 mcg Nebulization BID RT  
 budesonide (PULMICORT) 500 mcg/2 ml nebulizer suspension  500 mcg Nebulization BID RT  
  albuterol-ipratropium (DUO-NEB) 2.5 MG-0.5 MG/3 ML  3 mL Nebulization Q6H RT  
 DULoxetine (CYMBALTA) capsule 60 mg  60 mg Oral DAILY  isosorbide mononitrate ER (IMDUR) tablet 30 mg  30 mg Oral DAILY  lisinopril (PRINIVIL, ZESTRIL) tablet 5 mg  5 mg Oral DAILY  montelukast (SINGULAIR) tablet 10 mg  10 mg Oral DAILY  sAXagliptin (ONGLYZA) tablet 5 mg  5 mg Oral DAILY  sodium chloride (NS) flush 5-10 mL  5-10 mL IntraVENous Q8H  
 sodium chloride (NS) flush 5-10 mL  5-10 mL IntraVENous PRN  
 acetaminophen (TYLENOL) tablet 650 mg  650 mg Oral Q4H PRN  
 oxyCODONE-acetaminophen (PERCOCET) 5-325 mg per tablet 1 Tab  1 Tab Oral Q4H PRN  
 HYDROmorphone (PF) (DILAUDID) injection 0.5 mg  0.5 mg IntraVENous Q4H PRN  
 zolpidem (AMBIEN) tablet 5 mg  5 mg Oral QHS PRN  
 insulin lispro (HUMALOG) injection   SubCUTAneous QID WITH MEALS  glucose chewable tablet 16 g  4 Tab Oral PRN  
 dextrose (D50W) injection syrg 12.5-25 g  25-50 mL IntraVENous PRN  
 glucagon (GLUCAGEN) injection 1 mg  1 mg IntraMUSCular PRN  pantoprazole (PROTONIX) tablet 40 mg  40 mg Oral ACB&D Care Plan discussed with: Patient Total time spent with patient: 30 minutes.  
 
Moon Dejesus MD

## 2018-11-28 NOTE — PROGRESS NOTES
Patient's blood pressure has stabilized, no ongoing critical care issues. Will sign off at this time.

## 2018-11-28 NOTE — PROGRESS NOTES
Problem: Mobility Impaired (Adult and Pediatric) Goal: *Acute Goals and Plan of Care (Insert Text) Physical Therapy Goals Revised 11/28/18 1. Patient will move from supine to sit and sit to supine , scoot up and down and roll side to side in bed with moderate assistance within 7 days. 2. Patient will perform sit to stand with maximum assistance within 7 days. 3. Patient will ambulate with maximum assistance 15' with the least restrictive device within 7 days. 4. Patient will verbalize and demonstrate understanding of spinal precautions (No bending, lifting greater than 5 lbs, or twisting; log-roll technique; frequent repositioning as instructed) within 4 days. Initiated 11/20/2018 1. Patient will move from supine to sit and sit to supine , scoot up and down and roll side to side in bed with independence within 4 days. 2. Patient will perform sit to stand with modified independence within 4 days. 3. Patient will ambulate with modified independence for 200 feet with the least restrictive device within 4 days. 4. Patient will verbalize and demonstrate understanding of spinal precautions (No bending, lifting greater than 5 lbs, or twisting; log-roll technique; frequent repositioning as instructed) within 4 days. physical Therapy TREATMENT: WEEKLY REASSESSMENT Patient: Lacie Fernandes (14 y.o. female) Date: 11/28/2018 Diagnosis: Dyspnea Dyspnea Dyspnea Procedure(s) (LRB): 
PACU/RECOVERY (N/A) 1 Day Post-Op Precautions: Fall;back Chart, physical therapy assessment, plan of care and goals were reviewed. ASSESSMENT: 
Pt Ursula Jackman presents with decreased strength, AROM, and endurance, impaired sitting balance, back pain with activity, lightheadedness with position change and after activity, decreased BP after activity (stable with supine to sit), and limited functional mobility on day 14 of admission with dyspnea and T7 compression fracture.  Pt now POD 1 s/p kyphoplasty with orders for TLSO during ambulation. Pt agreeable to PT including standing and transfer to chair on PT arrival, however once sitting edge of bed she adamantly declines further mobility despite encouragement from multiple staff. She requires encouragement and education to offer maximum efforts during mobility and at times resists despite cueing and stated agreement. After activity BP gradually decreases as documented in flowsheet and rebounds with LEs elevated, trendelenburg positioning, and time. RN present during period of decreased BP and PCT present earlier in treatment to facilitate pt hygiene and linens change. Since initial evaluation pt demonstrating decline in mobility and activity tolerance despite reported improved pain today. Pt has participated in only 2 PT treatments over past week with 3 attempts unsuccessful due to pt declining and procedure. PLAN: 
Goals have been updated based on progression since last assessment. Patient continues to benefit from skilled intervention to address the above impairments. Continue to follow the patient 5 times a week to address goals. Planned Interventions: 
[x]              Bed Mobility Training             []       Neuromuscular Re-Education [x]              Transfer Training                   []       Orthotic/Prosthetic Training 
[x]              Gait Training                         []       Modalities [x]              Therapeutic Exercises           []       Edema Management/Control [x]              Therapeutic Activities            [x]       Patient and Family Training/Education []              Other (comment): 
Discharge Recommendations: Brandon Banks Further Equipment Recommendations for Discharge: defer to SNF SUBJECTIVE:  
Patient stated \"I'm wet, on PT arrival. Pt states bladder continence and denies having called for staff assistance with toileting.  She complains of \"diaper rash. \" Pt educated regarding need to contact staff for assistance with toileting and causes of perineal skin breakdown. Pt received supine, agreeable to PT and cleared by RN. OBJECTIVE DATA SUMMARY:  
Critical Behavior: 
  
  pt alert, oriented x 4, follows commands Strength:  
  
  
  grossly decreased throughout. Functional Mobility Training: 
Bed Mobility: 
Rolling: Additional time; Moderate assistance; extensive rolling performed for hygiene and linens change. Supine to Sit: Additional time;Maximum assistance;Assist x2 Sit to Supine: Additional time;Maximum assistance;Assist x2 Scooting: Maximum assistance Brace donned with total assistance. Transfers: 
Sit to Stand: (pt declines participation) Pt sits edge of bed x 5 mins, initially stating mild lightheadedness that resolves, BP stable. Pt then stating pain too severe to attempt transfers and repeating, \"I can't stand up,\" clarifying that she is not confident she will succeed. Pt states, \"I need more leg exercises in bed to help me stand. \" Pt educated regarding role of staff in pt safety and assistance, and safety mechanisms to prevent falls. She continues to refuse participation in standing trial and states she must return to supine. She initiates return to supine prior to brace and gait belt removal.  
Once returned to supine she reports mild lightheadedness and pain improving. BP assessed and decreased (RN present) and further decreases over next 3 mins with eventual rebound after trendelenburg positioning with LEs additionally elevated. Balance: 
Sitting: Impaired Sitting - Static: Good (unsupported) Sitting - Dynamic: Fair (occasional) Standing: (pt declines participation) Therapeutic Exercises:  
Pt refusing participation in supine exercises. Pain: 
Pain Scale 1: Numeric (0 - 10) Pain Intensity 1: 0 at rest 
  
  
  
  
 Activity Tolerance:  
Limited by pain, fatigue, decreased BP, lightheadedness. Please refer to the flowsheet for vital signs taken during this treatment. After treatment:  
[]  Patient left in no apparent distress sitting up in chair 
[x]  Patient left in no apparent distress in bed with BP stabilized and RN present for continued monitoring 
[x]  Call bell left within reach [x]  Nursing notified 
[x]  Caregiver present [x]  Bed alarm activated COMMUNICATION/COLLABORATION:  
The patients plan of care was discussed with: Registered Nurse and Rehabilitation Attendant Sanjuanita Agustin PT, DPT Time Calculation: 46 mins

## 2018-11-28 NOTE — PROGRESS NOTES
Per chart review, Kyphoplasty on yesterday. PT to evaluate for discharge placement. Pt has been accepted to return to [facility]. CM provided updates via allscripts. CM will continue to follow for discharge planning. Shorty Che, BS, [facility]

## 2018-11-28 NOTE — ROUTINE PROCESS
Bedside shift change report given to Rose Marie Dai (oncoming nurse) by Tierra Lazar (offgoing nurse). Report included the following information SBAR.

## 2018-11-29 LAB
ANION GAP SERPL CALC-SCNC: 12 MMOL/L (ref 5–15)
BASOPHILS # BLD: 0 K/UL (ref 0–0.1)
BASOPHILS NFR BLD: 0 % (ref 0–1)
BUN SERPL-MCNC: 17 MG/DL (ref 6–20)
BUN/CREAT SERPL: 15 (ref 12–20)
CALCIUM SERPL-MCNC: 7.5 MG/DL (ref 8.5–10.1)
CHLORIDE SERPL-SCNC: 112 MMOL/L (ref 97–108)
CO2 SERPL-SCNC: 22 MMOL/L (ref 21–32)
CREAT SERPL-MCNC: 1.1 MG/DL (ref 0.55–1.02)
DIFFERENTIAL METHOD BLD: ABNORMAL
EOSINOPHIL # BLD: 0.2 K/UL (ref 0–0.4)
EOSINOPHIL NFR BLD: 3 % (ref 0–7)
ERYTHROCYTE [DISTWIDTH] IN BLOOD BY AUTOMATED COUNT: 15.2 % (ref 11.5–14.5)
GLUCOSE BLD STRIP.AUTO-MCNC: 121 MG/DL (ref 65–100)
GLUCOSE BLD STRIP.AUTO-MCNC: 131 MG/DL (ref 65–100)
GLUCOSE BLD STRIP.AUTO-MCNC: 78 MG/DL (ref 65–100)
GLUCOSE BLD STRIP.AUTO-MCNC: 83 MG/DL (ref 65–100)
GLUCOSE BLD STRIP.AUTO-MCNC: 84 MG/DL (ref 65–100)
GLUCOSE SERPL-MCNC: 86 MG/DL (ref 65–100)
HCT VFR BLD AUTO: 31.5 % (ref 35–47)
HEMOCCULT STL QL: NEGATIVE
HGB BLD-MCNC: 9.6 G/DL (ref 11.5–16)
IMM GRANULOCYTES # BLD: 0 K/UL
IMM GRANULOCYTES NFR BLD AUTO: 0 %
LYMPHOCYTES # BLD: 1.3 K/UL (ref 0.8–3.5)
LYMPHOCYTES NFR BLD: 26 % (ref 12–49)
MAGNESIUM SERPL-MCNC: 1.2 MG/DL (ref 1.6–2.4)
MCH RBC QN AUTO: 28.8 PG (ref 26–34)
MCHC RBC AUTO-ENTMCNC: 30.5 G/DL (ref 30–36.5)
MCV RBC AUTO: 94.6 FL (ref 80–99)
MONOCYTES # BLD: 0.4 K/UL (ref 0–1)
MONOCYTES NFR BLD: 7 % (ref 5–13)
NEUTS SEG # BLD: 3.1 K/UL (ref 1.8–8)
NEUTS SEG NFR BLD: 64 % (ref 32–75)
NRBC # BLD: 0 K/UL (ref 0–0.01)
NRBC BLD-RTO: 0 PER 100 WBC
PLATELET # BLD AUTO: 146 K/UL (ref 150–400)
PMV BLD AUTO: 10.4 FL (ref 8.9–12.9)
POTASSIUM SERPL-SCNC: 3.9 MMOL/L (ref 3.5–5.1)
RBC # BLD AUTO: 3.33 M/UL (ref 3.8–5.2)
RBC MORPH BLD: ABNORMAL
SERVICE CMNT-IMP: ABNORMAL
SERVICE CMNT-IMP: ABNORMAL
SERVICE CMNT-IMP: NORMAL
SODIUM SERPL-SCNC: 146 MMOL/L (ref 136–145)
WBC # BLD AUTO: 5 K/UL (ref 3.6–11)

## 2018-11-29 PROCEDURE — 74011000250 HC RX REV CODE- 250: Performed by: INTERNAL MEDICINE

## 2018-11-29 PROCEDURE — 74011250637 HC RX REV CODE- 250/637: Performed by: FAMILY MEDICINE

## 2018-11-29 PROCEDURE — 85025 COMPLETE CBC W/AUTO DIFF WBC: CPT

## 2018-11-29 PROCEDURE — 36415 COLL VENOUS BLD VENIPUNCTURE: CPT

## 2018-11-29 PROCEDURE — 74011250636 HC RX REV CODE- 250/636: Performed by: FAMILY MEDICINE

## 2018-11-29 PROCEDURE — 94640 AIRWAY INHALATION TREATMENT: CPT

## 2018-11-29 PROCEDURE — 83735 ASSAY OF MAGNESIUM: CPT

## 2018-11-29 PROCEDURE — 82962 GLUCOSE BLOOD TEST: CPT

## 2018-11-29 PROCEDURE — 94760 N-INVAS EAR/PLS OXIMETRY 1: CPT

## 2018-11-29 PROCEDURE — 74011250637 HC RX REV CODE- 250/637: Performed by: INTERNAL MEDICINE

## 2018-11-29 PROCEDURE — 74011250636 HC RX REV CODE- 250/636: Performed by: INTERNAL MEDICINE

## 2018-11-29 PROCEDURE — 94660 CPAP INITIATION&MGMT: CPT

## 2018-11-29 PROCEDURE — 80048 BASIC METABOLIC PNL TOTAL CA: CPT

## 2018-11-29 PROCEDURE — 65270000029 HC RM PRIVATE

## 2018-11-29 PROCEDURE — 97530 THERAPEUTIC ACTIVITIES: CPT

## 2018-11-29 PROCEDURE — 77010033678 HC OXYGEN DAILY

## 2018-11-29 PROCEDURE — 77030038269 HC DRN EXT URIN PURWCK BARD -A

## 2018-11-29 PROCEDURE — 82272 OCCULT BLD FECES 1-3 TESTS: CPT

## 2018-11-29 RX ORDER — MAGNESIUM SULFATE HEPTAHYDRATE 40 MG/ML
4 INJECTION, SOLUTION INTRAVENOUS ONCE
Status: DISCONTINUED | OUTPATIENT
Start: 2018-11-29 | End: 2018-11-29

## 2018-11-29 RX ORDER — MAGNESIUM SULFATE HEPTAHYDRATE 40 MG/ML
2 INJECTION, SOLUTION INTRAVENOUS
Status: COMPLETED | OUTPATIENT
Start: 2018-11-29 | End: 2018-11-29

## 2018-11-29 RX ORDER — LISINOPRIL 5 MG/1
2.5 TABLET ORAL DAILY
Status: DISCONTINUED | OUTPATIENT
Start: 2018-11-29 | End: 2018-12-03 | Stop reason: HOSPADM

## 2018-11-29 RX ORDER — LANOLIN ALCOHOL/MO/W.PET/CERES
400 CREAM (GRAM) TOPICAL 3 TIMES DAILY
Status: DISCONTINUED | OUTPATIENT
Start: 2018-11-29 | End: 2018-11-29

## 2018-11-29 RX ADMIN — IPRATROPIUM BROMIDE AND ALBUTEROL SULFATE 3 ML: .5; 3 SOLUTION RESPIRATORY (INHALATION) at 20:06

## 2018-11-29 RX ADMIN — IPRATROPIUM BROMIDE AND ALBUTEROL SULFATE 3 ML: .5; 3 SOLUTION RESPIRATORY (INHALATION) at 07:11

## 2018-11-29 RX ADMIN — BUDESONIDE 500 MCG: 0.5 INHALANT RESPIRATORY (INHALATION) at 20:06

## 2018-11-29 RX ADMIN — MAGNESIUM SULFATE HEPTAHYDRATE 2 G: 40 INJECTION, SOLUTION INTRAVENOUS at 08:16

## 2018-11-29 RX ADMIN — CLOPIDOGREL BISULFATE 75 MG: 75 TABLET ORAL at 08:16

## 2018-11-29 RX ADMIN — POTASSIUM CHLORIDE 10 MEQ: 750 TABLET, EXTENDED RELEASE ORAL at 17:07

## 2018-11-29 RX ADMIN — LISINOPRIL 2.5 MG: 5 TABLET ORAL at 08:16

## 2018-11-29 RX ADMIN — CARVEDILOL 12.5 MG: 12.5 TABLET, FILM COATED ORAL at 17:07

## 2018-11-29 RX ADMIN — IPRATROPIUM BROMIDE AND ALBUTEROL SULFATE 3 ML: .5; 3 SOLUTION RESPIRATORY (INHALATION) at 15:17

## 2018-11-29 RX ADMIN — SAXAGLIPTIN 5 MG: 5 TABLET, FILM COATED ORAL at 08:16

## 2018-11-29 RX ADMIN — MONTELUKAST SODIUM 10 MG: 10 TABLET, COATED ORAL at 08:16

## 2018-11-29 RX ADMIN — IPRATROPIUM BROMIDE AND ALBUTEROL SULFATE 3 ML: .5; 3 SOLUTION RESPIRATORY (INHALATION) at 01:45

## 2018-11-29 RX ADMIN — HYDROMORPHONE HYDROCHLORIDE 0.5 MG: 2 INJECTION, SOLUTION INTRAMUSCULAR; INTRAVENOUS; SUBCUTANEOUS at 21:55

## 2018-11-29 RX ADMIN — RIVAROXABAN 20 MG: 20 TABLET, FILM COATED ORAL at 17:07

## 2018-11-29 RX ADMIN — BUDESONIDE 500 MCG: 0.5 INHALANT RESPIRATORY (INHALATION) at 07:11

## 2018-11-29 RX ADMIN — CARVEDILOL 12.5 MG: 12.5 TABLET, FILM COATED ORAL at 08:16

## 2018-11-29 RX ADMIN — DULOXETINE 60 MG: 30 CAPSULE, DELAYED RELEASE ORAL at 08:16

## 2018-11-29 RX ADMIN — Medication 10 ML: at 06:00

## 2018-11-29 RX ADMIN — ISOSORBIDE MONONITRATE 30 MG: 30 TABLET, EXTENDED RELEASE ORAL at 08:16

## 2018-11-29 RX ADMIN — Medication 10 ML: at 14:00

## 2018-11-29 RX ADMIN — Medication 10 ML: at 22:01

## 2018-11-29 RX ADMIN — MAGNESIUM SULFATE HEPTAHYDRATE 2 G: 40 INJECTION, SOLUTION INTRAVENOUS at 09:20

## 2018-11-29 RX ADMIN — POTASSIUM CHLORIDE 10 MEQ: 750 TABLET, EXTENDED RELEASE ORAL at 08:16

## 2018-11-29 RX ADMIN — PANTOPRAZOLE SODIUM 40 MG: 40 TABLET, DELAYED RELEASE ORAL at 06:55

## 2018-11-29 RX ADMIN — PANTOPRAZOLE SODIUM 40 MG: 40 TABLET, DELAYED RELEASE ORAL at 17:07

## 2018-11-29 NOTE — PROGRESS NOTES
Per chart review, recommendation for SNF at discharge. [facility] notified about discharge plan. Pt will discharge to SNF once medically stable. Shorty Che, BS, [facility]

## 2018-11-29 NOTE — PROGRESS NOTES
Bedside shift change report given to [id number]lecillo (oncoming nurse) by Donny Cash (offgoing nurse). Report included the following information SBAR, Kardex, Intake/Output, MAR, Accordion and Recent Results.

## 2018-11-29 NOTE — PROGRESS NOTES
Daily Progress Note: 11/29/2018 Dolly Mckinnon MD 
 
Assessment/Plan: Dyspnea (11/13/2018) improved - workup with Pul and Card. - has completed steroid course 
  
  Type II diabetes mellitus ([facility] (10/9/2015) - with reported nephropathy with proteinuria without CKD 
            - continue saxagliptin and follow on SSI 
  
  Coronary artery disease involving native coronary artery without angina pectoris (1/22/2016) - stable, continue cardiac meds 
             
   Essential hypertension (1/22/2016)- on low side at times 
            - decrease Coreg to 12.5mg BID 
            - decrease Lisinopril to 2.5 mg 
  
  Chronic anticoagulation (3/30/2018) 
            -therapeutic lovenox,  
  
  Obesity, BMI 30-39. 9([facility]) (10/1/2018) - counseled on weight loss, this is contributing to her current symptoms 
  
  ARF (acute renal failure) ([facility] (11/13/2018) - creatinine up acutely from prior, possibly due to diuresis 
            - holding diuretic  
  
  Closed wedge compression fracture of T7 vertebra ([facility] (11/13/2018) - kypho 11/27 Anemia - Hb 9.6- check stools for blood Diarrhea 
           - afebrile- if fever or continuing will check c-diff Code status: 
            - patient is FULL CODE, no AMD on file, her daughter Dominga Posada is her surrogate Dispo:will depend on how she does after kyphoplasty. 
   
 
Problem List: 
Problem List as of 11/29/2018 Date Reviewed: 11/27/2018 Codes Class Noted - Resolved * (Principal) Dyspnea ICD-10-CM: R06.00 
ICD-9-CM: 786.09  11/13/2018 - Present ARF (acute renal failure) (HCC) ICD-10-CM: N17.9 ICD-9-CM: 584.9  11/13/2018 - Present Obesity (BMI 30-39.9) (Chronic) ICD-10-CM: Y26.1 ICD-9-CM: 278.00  11/13/2018 - Present Closed wedge compression fracture of T7 vertebra ([facility] ICD-10-CM: E27.027M ICD-9-CM: 805.2  11/13/2018 - Present Type 2 diabetes with nephropathy ([facility] ICD-10-CM: E11.21 
ICD-9-CM: 250.40, 583.81  10/1/2018 - Present Chest pain ICD-10-CM: R07.9 ICD-9-CM: 786.50  6/27/2018 - Present Generalized abdominal pain ICD-10-CM: R10.84 ICD-9-CM: 789.07  6/27/2018 - Present Recurrent deep vein thrombosis (DVT) (HCC) ICD-10-CM: I82.409 ICD-9-CM: 453.40  3/30/2018 - Present Chronic anticoagulation (Chronic) ICD-10-CM: Z79.01 
ICD-9-CM: V58.61  3/30/2018 - Present Coronary artery disease involving native coronary artery without angina pectoris (Chronic) ICD-10-CM: I25.10 ICD-9-CM: 414.01  1/22/2016 - Present Essential hypertension (Chronic) ICD-10-CM: I10 
ICD-9-CM: 401.9  1/22/2016 - Present Type II diabetes mellitus (HCC) (Chronic) ICD-10-CM: E11.9 ICD-9-CM: 250.00  10/9/2015 - Present NSTEMI (non-ST elevated myocardial infarction) ([facility] ICD-10-CM: I21.4 ICD-9-CM: 410.70  10/9/2015 - Present RESOLVED: CAD (coronary artery disease) ICD-10-CM: I25.10 ICD-9-CM: 414.00  10/9/2015 - 1/22/2016 RESOLVED: HTN (hypertension) ICD-10-CM: I10 
ICD-9-CM: 401.9  10/9/2015 - 1/22/2016 Subjective:  
  81 y.o.  female who is admitted with SOB. Ms. Alessandra Doe presented to the Emergency Department this PM complaining of SOB: for the past 3 months, worked up by Pulmonary and Cardiology as an outpatient without any definite diagnosis, not hypoxic, associated with back pain, unable to take a deep breath without pain, back pain started at the same time as the SOB. History obtained from chart review and the patient. Previous records reviewed, recent admit for chest pain. (Dr Yecenia Love). 11/14:  Feels much better this AM with less back pain on meds. Still very tender over site with any palpation. Less air hunger but still \"not breathing back to normal yet. \"  Glucoses up with the steroids. Pul and Card seeing also. 11/15:    Reports she continues to feel better and reports no longer SOB. She is not moving much due to back pain. No chest pains. Ortho to decide next step for compression fx. No complaints of weakness or numbness LEs. 
 
11/16:  No longer short of breath - will wean steroids to po. IR plans kyphoplasty today. May benefit from Prolia for osteoporosis outpt  
 
11/17/18: Transferred to ICU yesterday for A-fib w/ RVR. Converted after Dilt bolus. Now sinus tach in the 100s. Coreg increased this am. She is not requiring a lot of pain meds but also not moving a lot. Xarelto being held and she is on Lovenox now. 11/18/18: Transferred out of ICU yesterday. Having pain with any movement. Dr Keke Lees would like her to stay on Plavix but is ok holding Xarelto/Lovenox for kyphoplasty. Will need to see if IR can do kyphoplasty on Plavix. Ortho getting her fitted for brace. Breathing well. No SOB. 11/19:  Little pain unless she moves about. Kyphoplasty planned for today. 11/20/18: Kyphoplasty now planned for Monday after holding Plavix. She would like to go home and return on Monday as an outpatient for the procedure. Was able to sit in chair yesterday. 11/22:  Pt was seen by PT yesterday. Rec that she needs SNF or rehab. She wants to stay here until MOnday when she gets kyphoplasty at [address] [place] [address]. The logistics of this have not been coordinated yet. Will wait until tomorrow when IR is open to further figure out her transfer. Tolerating PO, +BM. 11/23: Dr Barrett Win to do kyphoplasty today at [address] notes she has limited IV access for lab draws. Hopefully will be able to move toward discharge thanks to today's procedure. Will reevaluate her need for IV after procedure. I don't want to get a central line if she will be discharged tomorrow. Pt eager to get the procedure done. Her preference would be to go home with [facility] PT. Tolerated PO yesterday, NPO this AM. 11/24: kyphoplasty was cancelled yesterday due to staffing issues. Rescheduled for Monday at 2pm, here at [facility] by Dr Florence Dozier. PT unwilling to work with PT yesterday due to pain. Ongoing nausea and hypotensive yesterday after IV compazine. LImited IV access, although she doesn't really need it. Will try oral zofran now. 11/25: No AM labs for 2d as pt is difficult stick and has poor IV access. THis is ok with me as she is awaiting placement after her kyphoplasty. BPs on the low side again last night. Will leave parameters for her BP meds. PT still refusing to get up with PT because \"I was told not to move and to be on complete bed rest by the IR doctor. \"  Does note she has a HA today. 11/26:  No complaints other than back pain. Kyphoplasty again planned for later today. K+ 2.6 and Mag 1.3 this AM - replacing. Stable to have kyphoplasty today. 11/27:  No complaints other than back pain. Kyphoplasty again postponed yesterday - attempting to get it done again today. AM labs pending. BP was low yesterday but responded to fluids - never was in any distress - was likely a little dehydrated and from lying constantly in bed. Very difficult to get IV sites/ labs from her - AM labs pending - need to at least get lab for K+ and Mg+ - other RNs will try this AM.  
 
11/28: Had kypho yesterday. Feels much better with less pain. BP on the low side so will decrease Coreg. Hopefully can work with PT. She lives alone so would benefit from rehab. She has been at [address] [place] [address] in the past. Restart Plavix. 11/29:  4-5 loose stools yesterday but none overnight nurses report. Hb at 9.6. Checking SFOB. Mag still low - replacing but will change to IV with her diarrhea. Creat better.  
  
Review of Systems: A comprehensive review of systems was negative except for that written in the HPI. Objective:  
Physical Exam:  
 
Visit Vitals /55 (BP 1 Location: Left arm, BP Patient Position: At rest) Pulse 98 Temp 98 °F (36.7 °C) Resp 18 Ht 5' 7\" (1.702 m) Wt 99.4 kg (219 lb 3.2 oz) SpO2 97% Breastfeeding? No  
BMI 35.38 kg/m² O2 Flow Rate (L/min): 2 l/min O2 Device: Nasal cannula Temp (24hrs), Av °F (36.7 °C), Min:97.6 °F (36.4 °C), Max:98.6 °F (37 °C) No intake/output data recorded.  07 - 1900 In: 1370 [P.O.:120; I.V.:1250] Out: - General:  cooperative, obese, no distress, appears stated age. Head:  Normocephalic, without obvious abnormality, atraumatic. Eyes:  Conjunctivae/corneas clear. PERRL, EOMs intact. Nose: NC O2 in place. Throat: Lips, mucosa, and tongue moist.  
Neck: Supple, symmetrical, trachea midline, no adenopathy, thyroid: no enlargement/tenderness/nodules, no carotid bruit and no JVD. Back:   Symmetric,with marked tenderness to palpation over T6-7-8 area. Lungs:   Diminished BS bilaterally but otherwise clear at this time. Chest wall:  No tenderness or deformity. Heart:  Irregular rate and rhythm, no murmur, click, rub or gallop. Abdomen:   Soft, non-tender. Bowel sounds normal. No masses,  No organomegaly. Extremities: no cyanosis. Trace LE edema. No calf tenderness or cords. Skin:  turgor normal.  bruising on arms sl worse Neurologic: CNII-XII intact. Alert and oriented X 3. Fine motor of hands and fingers normal.   equal.  Gait not tested at this time. Sensation grossly normal to touch. Gross motor of extremities normal.    
 
Data Review:  
  CT of Chest 18 FINDINGS: 
THYROID: No nodule. MEDIASTINUM: No mass or lymphadenopathy. SONJA: No mass or lymphadenopathy. THORACIC AORTA: No aneurysm. Mild vascular calcifications MAIN PULMONARY ARTERY: Normal in caliber. TRACHEA/BRONCHI: Patent. ESOPHAGUS: No wall thickening or dilatation. Small hiatal hernia HEART: Normal in size. PLEURA: No effusion or pneumothorax. LUNGS: No nodule, mass, or airspace disease. Bilateral atelectatic changes. INCIDENTALLY IMAGED UPPER ABDOMEN: No focal abnormality. BONES: Bones are osteopenic. There is collapse of the superior endplate of T7. This has occurred since June 2018 Possible milk of calcium cyst within the right breast 
IMPRESSION: 
1. No acute cardiopulmonary disease 2. Interval collapse superior endplate of T7. The patient has back pain this may 
be acute to subacute and MR imaging would better characterize 
  
Recent Days: 
Recent Labs  
  11/29/18 0248 11/28/18 0249 11/27/18 
0523 WBC 5.0 6.5 6.5 HGB 9.6* 10.3* 11.1*  
HCT 31.5* 32.5* 34.8*  
* 149* 157 Recent Labs  
  11/29/18 0248 11/28/18 0249 11/27/18 
8750 * 142 140  
K 3.9 3.6 3.4*  
* 109* 107 CO2 22 19* 20* GLU 86 163* 117* BUN 17 25* 26* CREA 1.10* 1.30* 1.71* CA 7.5* 7.8* 8.4* MG 1.2* 1.4* 1.5* No results for input(s): PH, PCO2, PO2, HCO3, FIO2 in the last 72 hours. 24 Hour Results: 
Recent Results (from the past 24 hour(s)) GLUCOSE, POC Collection Time: 11/28/18  7:03 AM  
Result Value Ref Range Glucose (POC) 114 (H) 65 - 100 mg/dL Performed by Jordan Cano (PCT) GLUCOSE, POC Collection Time: 11/28/18 11:26 AM  
Result Value Ref Range Glucose (POC) 126 (H) 65 - 100 mg/dL Performed by Marcell Hooper ([facility]) GLUCOSE, POC Collection Time: 11/28/18  4:24 PM  
Result Value Ref Range Glucose (POC) 129 (H) 65 - 100 mg/dL Performed by Marcell Hooper ([facility]) GLUCOSE, POC Collection Time: 11/28/18  9:09 PM  
Result Value Ref Range Glucose (POC) 102 (H) 65 - 100 mg/dL Performed by Shaina Winchester ([facility]) MAGNESIUM Collection Time: 11/29/18  2:48 AM  
Result Value Ref Range Magnesium 1.2 (L) 1.6 - 2.4 mg/dL CBC WITH AUTOMATED DIFF Collection Time: 11/29/18  2:48 AM  
Result Value Ref Range WBC 5.0 3.6 - 11.0 K/uL  
 RBC 3.33 (L) 3.80 - 5.20 M/uL HGB 9.6 (L) 11.5 - 16.0 g/dL HCT 31.5 (L) 35.0 - 47.0 %  MCV 94.6 80.0 - 99.0 FL  
 MCH 28.8 26.0 - 34.0 PG  
 MCHC 30.5 30.0 - 36.5 g/dL  
 RDW 15.2 (H) 11.5 - 14.5 % PLATELET 975 (L) 191 - 400 K/uL MPV 10.4 8.9 - 12.9 FL  
 NRBC 0.0 0  WBC ABSOLUTE NRBC 0.00 0.00 - 0.01 K/uL NEUTROPHILS 64 32 - 75 % LYMPHOCYTES 26 12 - 49 % MONOCYTES 7 5 - 13 % EOSINOPHILS 3 0 - 7 % BASOPHILS 0 0 - 1 % IMMATURE GRANULOCYTES 0 %  
 ABS. NEUTROPHILS 3.1 1.8 - 8.0 K/UL  
 ABS. LYMPHOCYTES 1.3 0.8 - 3.5 K/UL  
 ABS. MONOCYTES 0.4 0.0 - 1.0 K/UL  
 ABS. EOSINOPHILS 0.2 0.0 - 0.4 K/UL  
 ABS. BASOPHILS 0.0 0.0 - 0.1 K/UL  
 ABS. IMM. GRANS. 0.0 K/UL  
 DF MANUAL    
 RBC COMMENTS ANISOCYTOSIS 
1+ METABOLIC PANEL, BASIC Collection Time: 11/29/18  2:48 AM  
Result Value Ref Range Sodium 146 (H) 136 - 145 mmol/L Potassium 3.9 3.5 - 5.1 mmol/L Chloride 112 (H) 97 - 108 mmol/L  
 CO2 22 21 - 32 mmol/L Anion gap 12 5 - 15 mmol/L Glucose 86 65 - 100 mg/dL BUN 17 6 - 20 MG/DL Creatinine 1.10 (H) 0.55 - 1.02 MG/DL  
 BUN/Creatinine ratio 15 12 - 20 GFR est AA 60 (L) >60 ml/min/1.73m2 GFR est non-AA 49 (L) >60 ml/min/1.73m2 Calcium 7.5 (L) 8.5 - 10.1 MG/DL Medications reviewed Current Facility-Administered Medications Medication Dose Route Frequency  carvedilol (COREG) tablet 12.5 mg  12.5 mg Oral BID WITH MEALS  clopidogrel (PLAVIX) tablet 75 mg  75 mg Oral DAILY  rivaroxaban (XARELTO) tablet 20 mg  20 mg Oral DAILY WITH DINNER  potassium chloride SR (KLOR-CON 10) tablet 10 mEq  10 mEq Oral BID  magnesium oxide (MAG-OX) tablet 400 mg  400 mg Oral BID  
 0.9% sodium chloride infusion  100 mL/hr IntraVENous CONTINUOUS  
 ondansetron (ZOFRAN ODT) tablet 4 mg  4 mg Oral Q8H PRN  
 calcitonin (salmon) (MIACALCIN) nasal 1 Spray  1 [place] IntraNASal DAILY  arformoterol (BROVANA) neb solution 15 mcg  15 mcg Nebulization BID RT  
 budesonide (PULMICORT) 500 mcg/2 ml nebulizer suspension  500 mcg Nebulization BID RT  
  albuterol-ipratropium (DUO-NEB) 2.5 MG-0.5 MG/3 ML  3 mL Nebulization Q6H RT  
 DULoxetine (CYMBALTA) capsule 60 mg  60 mg Oral DAILY  isosorbide mononitrate ER (IMDUR) tablet 30 mg  30 mg Oral DAILY  lisinopril (PRINIVIL, ZESTRIL) tablet 5 mg  5 mg Oral DAILY  montelukast (SINGULAIR) tablet 10 mg  10 mg Oral DAILY  sAXagliptin (ONGLYZA) tablet 5 mg  5 mg Oral DAILY  sodium chloride (NS) flush 5-10 mL  5-10 mL IntraVENous Q8H  
 sodium chloride (NS) flush 5-10 mL  5-10 mL IntraVENous PRN  
 acetaminophen (TYLENOL) tablet 650 mg  650 mg Oral Q4H PRN  
 oxyCODONE-acetaminophen (PERCOCET) 5-325 mg per tablet 1 Tab  1 Tab Oral Q4H PRN  
 HYDROmorphone (PF) (DILAUDID) injection 0.5 mg  0.5 mg IntraVENous Q4H PRN  
 zolpidem (AMBIEN) tablet 5 mg  5 mg Oral QHS PRN  
 insulin lispro (HUMALOG) injection   SubCUTAneous QID WITH MEALS  glucose chewable tablet 16 g  4 Tab Oral PRN  
 dextrose (D50W) injection syrg 12.5-25 g  25-50 mL IntraVENous PRN  
 glucagon (GLUCAGEN) injection 1 mg  1 mg IntraMUSCular PRN  pantoprazole (PROTONIX) tablet 40 mg  40 mg Oral ACB&D Care Plan discussed with: Patient Total time spent with patient: 30 minutes.  
 
Carmen Kulkarni MD

## 2018-11-29 NOTE — PROGRESS NOTES
Problem: Mobility Impaired (Adult and Pediatric) Goal: *Acute Goals and Plan of Care (Insert Text) Physical Therapy Goals Revised 11/28/18 1. Patient will move from supine to sit and sit to supine , scoot up and down and roll side to side in bed with moderate assistance within 7 days. 2. Patient will perform sit to stand with maximum assistance within 7 days. 3. Patient will ambulate with maximum assistance 15' with the least restrictive device within 7 days. 4. Patient will verbalize and demonstrate understanding of spinal precautions (No bending, lifting greater than 5 lbs, or twisting; log-roll technique; frequent repositioning as instructed) within 4 days. Initiated 11/20/2018 1. Patient will move from supine to sit and sit to supine , scoot up and down and roll side to side in bed with independence within 4 days. 2. Patient will perform sit to stand with modified independence within 4 days. 3. Patient will ambulate with modified independence for 200 feet with the least restrictive device within 4 days. 4. Patient will verbalize and demonstrate understanding of spinal precautions (No bending, lifting greater than 5 lbs, or twisting; log-roll technique; frequent repositioning as instructed) within 4 days. physical Therapy TREATMENT Patient: Marquez Dennison (57 y.o. female) Date: 11/29/2018 Diagnosis: Dyspnea Dyspnea Dyspnea Procedure(s) (LRB): 
PACU/RECOVERY (N/A) 2 Days Post-Op Precautions: Fall Chart, physical therapy assessment, plan of care and goals were reviewed. ASSESSMENT: 
Pt comes to sit with mod to max assist.Pt becomes short off breath sitting on EOB on O2 needing several minutes to recover. Pts vitals were stable. Pt to stand with mod assist of 2. Pt took 4 steps to bedside commode with RW min to mod assist of 2. Pt was assisted cleaning up before back to bed with min to mod assist of 2. Pt mod to max sit to supine. Pt fatigues very quickly mobility. Progress slow. Continue goals. Progression toward goals: 
[]    Improving appropriately and progressing toward goals [x]    Improving slowly and progressing toward goals 
[]    Not making progress toward goals and plan of care will be adjusted PLAN: 
Patient continues to benefit from skilled intervention to address the above impairments. Continue treatment per established plan of care. Discharge Recommendations:  Brandon Banks Further Equipment Recommendations for Discharge:  rolling walker SUBJECTIVE:  
 
 
OBJECTIVE DATA SUMMARY:  
Critical Behavior: 
  
  
  
  
Functional Mobility Training: 
Bed Mobility: 
  
Supine to Sit: Moderate assistance;Maximum assistance Sit to Supine: Maximum assistance; Moderate assistance;Assist x2 Transfers: 
Sit to Stand: Moderate assistance;Assist x2 Stand to Sit: Moderate assistance Balance: 
Sitting: High guard Sitting - Static: Fair (occasional) Standing: With support Standing - Static: FairAmbulation/Gait Training: 
Distance (ft): 2 Feet (ft) Assistive Device: Walker, rolling;Gait belt Ambulation - Level of Assistance: Moderate assistance;Minimal assistance;Assist x2 Base of Support: Narrowed Speed/Es: Pace decreased (<100 feet/min) Pain: 
Pain Scale 1: Numeric (0 - 10) Pain Intensity 1: 0 Activity Tolerance:  
Pt tolerated treatment fair Please refer to the flowsheet for vital signs taken during this treatment. After treatment:  
[]    Patient left in no apparent distress sitting up in chair 
[x]    Patient left in no apparent distress in bed 
[]    Call bell left within reach 
[]    Nursing notified 
[]    Caregiver present 
[]    Bed alarm activated COMMUNICATION/COLLABORATION:  
The patients plan of care was discussed with: Physical Therapist 
 
Tracy Corea PTA Time Calculation: 23 mins

## 2018-11-29 NOTE — DIABETES MGMT
DTC Progress Note Recommendations/ Comments: Noted steroids completed. If appropriate, please consider changing correction scale to normal sensitivity and also may consider holding Onglyza at this time given pt hypoglycemic this am and PO intakes poor. Current hospital DM medication:  
-Humalog insulin resistant correction  
-Onglyza 5mg daily  
-Prednisone 20mg bid with meals Chart reviewed on [place]. Patient is a 81 y.o. female with known history of Type 2 Diabetes on Onglyza 5mg daily at home. A1c:  
Lab Results Component Value Date/Time Hemoglobin A1c 8.5 (H) 11/13/2018 03:26 PM  
 Hemoglobin A1c 6.4 (H) 06/27/2018 10:48 AM  
 
 
Recent Glucose Results:  
Lab Results Component Value Date/Time GLU 86 11/29/2018 02:48 AM  
 GLUCPOC 121 (H) 11/29/2018 11:10 AM  
 GLUCPOC 83 11/29/2018 07:30 AM  
 GLUCPOC 78 11/29/2018 07:10 AM  
  
 
Lab Results Component Value Date/Time Creatinine 1.10 (H) 11/29/2018 02:48 AM  
 
Estimated Creatinine Clearance: 57.6 mL/min (A) (based on SCr of 1.1 mg/dL (H)). Active Orders Diet DIET DIABETIC CONSISTENT CARB Regular PO intake:  
Patient Vitals for the past 72 hrs: 
 % Diet Eaten 11/29/18 1108 25 % 11/28/18 1849 0 % 11/28/18 0927 90 % Will continue to follow as needed. Thank you Jennifer Rojas RD, CDE [facility] Office: [phone] Pager: [phone]

## 2018-11-30 LAB
ANION GAP SERPL CALC-SCNC: 10 MMOL/L (ref 5–15)
BASOPHILS # BLD: 0 K/UL (ref 0–0.1)
BASOPHILS NFR BLD: 0 % (ref 0–1)
BUN SERPL-MCNC: 11 MG/DL (ref 6–20)
BUN/CREAT SERPL: 11 (ref 12–20)
CALCIUM SERPL-MCNC: 7.8 MG/DL (ref 8.5–10.1)
CHLORIDE SERPL-SCNC: 107 MMOL/L (ref 97–108)
CO2 SERPL-SCNC: 22 MMOL/L (ref 21–32)
CREAT SERPL-MCNC: 0.98 MG/DL (ref 0.55–1.02)
DIFFERENTIAL METHOD BLD: ABNORMAL
EOSINOPHIL # BLD: 0.1 K/UL (ref 0–0.4)
EOSINOPHIL NFR BLD: 2 % (ref 0–7)
ERYTHROCYTE [DISTWIDTH] IN BLOOD BY AUTOMATED COUNT: 15.6 % (ref 11.5–14.5)
GLUCOSE BLD STRIP.AUTO-MCNC: 104 MG/DL (ref 65–100)
GLUCOSE BLD STRIP.AUTO-MCNC: 107 MG/DL (ref 65–100)
GLUCOSE BLD STRIP.AUTO-MCNC: 112 MG/DL (ref 65–100)
GLUCOSE BLD STRIP.AUTO-MCNC: 129 MG/DL (ref 65–100)
GLUCOSE SERPL-MCNC: 107 MG/DL (ref 65–100)
HCT VFR BLD AUTO: 31.2 % (ref 35–47)
HGB BLD-MCNC: 10 G/DL (ref 11.5–16)
IMM GRANULOCYTES # BLD: 0 K/UL
IMM GRANULOCYTES NFR BLD AUTO: 0 %
LYMPHOCYTES # BLD: 1.5 K/UL (ref 0.8–3.5)
LYMPHOCYTES NFR BLD: 25 % (ref 12–49)
MAGNESIUM SERPL-MCNC: 1.6 MG/DL (ref 1.6–2.4)
MCH RBC QN AUTO: 29.7 PG (ref 26–34)
MCHC RBC AUTO-ENTMCNC: 32.1 G/DL (ref 30–36.5)
MCV RBC AUTO: 92.6 FL (ref 80–99)
MONOCYTES # BLD: 0.4 K/UL (ref 0–1)
MONOCYTES NFR BLD: 7 % (ref 5–13)
MYELOCYTES NFR BLD MANUAL: 2 %
NEUTS SEG # BLD: 3.8 K/UL (ref 1.8–8)
NEUTS SEG NFR BLD: 64 % (ref 32–75)
NRBC # BLD: 0 K/UL (ref 0–0.01)
NRBC BLD-RTO: 0 PER 100 WBC
PLATELET # BLD AUTO: 161 K/UL (ref 150–400)
PMV BLD AUTO: 10.2 FL (ref 8.9–12.9)
POTASSIUM SERPL-SCNC: 3.3 MMOL/L (ref 3.5–5.1)
RBC # BLD AUTO: 3.37 M/UL (ref 3.8–5.2)
RBC MORPH BLD: ABNORMAL
SERVICE CMNT-IMP: ABNORMAL
SODIUM SERPL-SCNC: 139 MMOL/L (ref 136–145)
WBC # BLD AUTO: 5.9 K/UL (ref 3.6–11)

## 2018-11-30 PROCEDURE — 94760 N-INVAS EAR/PLS OXIMETRY 1: CPT

## 2018-11-30 PROCEDURE — 74011250637 HC RX REV CODE- 250/637: Performed by: FAMILY MEDICINE

## 2018-11-30 PROCEDURE — 74011000250 HC RX REV CODE- 250: Performed by: INTERNAL MEDICINE

## 2018-11-30 PROCEDURE — 80048 BASIC METABOLIC PNL TOTAL CA: CPT

## 2018-11-30 PROCEDURE — 85025 COMPLETE CBC W/AUTO DIFF WBC: CPT

## 2018-11-30 PROCEDURE — 94640 AIRWAY INHALATION TREATMENT: CPT

## 2018-11-30 PROCEDURE — 74011250637 HC RX REV CODE- 250/637: Performed by: INTERNAL MEDICINE

## 2018-11-30 PROCEDURE — 83735 ASSAY OF MAGNESIUM: CPT

## 2018-11-30 PROCEDURE — 82962 GLUCOSE BLOOD TEST: CPT

## 2018-11-30 PROCEDURE — 65270000029 HC RM PRIVATE

## 2018-11-30 PROCEDURE — 77010033678 HC OXYGEN DAILY

## 2018-11-30 PROCEDURE — 77030038269 HC DRN EXT URIN PURWCK BARD -A

## 2018-11-30 PROCEDURE — 36415 COLL VENOUS BLD VENIPUNCTURE: CPT

## 2018-11-30 RX ORDER — CALCITONIN SALMON 200 [IU]/.09ML
1 SPRAY, METERED NASAL DAILY
Qty: 1 BOTTLE | Refills: 3 | Status: SHIPPED
Start: 2018-11-30 | End: 2019-01-05 | Stop reason: CLARIF

## 2018-11-30 RX ORDER — LISINOPRIL 2.5 MG/1
TABLET ORAL
Qty: 90 TAB | Refills: 0 | Status: SHIPPED
Start: 2018-11-30 | End: 2019-01-05 | Stop reason: CLARIF

## 2018-11-30 RX ORDER — ONDANSETRON 4 MG/1
4 TABLET, ORALLY DISINTEGRATING ORAL
Qty: 1 TAB | Refills: 0 | Status: ON HOLD
Start: 2018-11-30 | End: 2019-07-13 | Stop reason: CLARIF

## 2018-11-30 RX ORDER — GUAIFENESIN/DEXTROMETHORPHAN 100-10MG/5
2 SYRUP ORAL
Status: DISCONTINUED | OUTPATIENT
Start: 2018-11-30 | End: 2018-12-03 | Stop reason: HOSPADM

## 2018-11-30 RX ORDER — CARVEDILOL 12.5 MG/1
12.5 TABLET ORAL 2 TIMES DAILY WITH MEALS
Qty: 60 TAB | Refills: 0 | Status: SHIPPED
Start: 2018-11-30 | End: 2019-01-05 | Stop reason: CLARIF

## 2018-11-30 RX ORDER — OXYCODONE AND ACETAMINOPHEN 5; 325 MG/1; MG/1
1 TABLET ORAL
Qty: 12 TAB | Refills: 0 | Status: SHIPPED | OUTPATIENT
Start: 2018-11-30 | End: 2019-01-05 | Stop reason: CLARIF

## 2018-11-30 RX ADMIN — IPRATROPIUM BROMIDE AND ALBUTEROL SULFATE 3 ML: .5; 3 SOLUTION RESPIRATORY (INHALATION) at 20:12

## 2018-11-30 RX ADMIN — PANTOPRAZOLE SODIUM 40 MG: 40 TABLET, DELAYED RELEASE ORAL at 17:11

## 2018-11-30 RX ADMIN — Medication 10 ML: at 21:59

## 2018-11-30 RX ADMIN — Medication 10 ML: at 14:00

## 2018-11-30 RX ADMIN — SAXAGLIPTIN 5 MG: 5 TABLET, FILM COATED ORAL at 10:14

## 2018-11-30 RX ADMIN — ACETAMINOPHEN 650 MG: 325 TABLET, FILM COATED ORAL at 17:15

## 2018-11-30 RX ADMIN — MONTELUKAST SODIUM 10 MG: 10 TABLET, COATED ORAL at 08:40

## 2018-11-30 RX ADMIN — BUDESONIDE 500 MCG: 0.5 INHALANT RESPIRATORY (INHALATION) at 20:12

## 2018-11-30 RX ADMIN — DULOXETINE 60 MG: 30 CAPSULE, DELAYED RELEASE ORAL at 08:40

## 2018-11-30 RX ADMIN — POTASSIUM CHLORIDE 10 MEQ: 750 TABLET, EXTENDED RELEASE ORAL at 17:12

## 2018-11-30 RX ADMIN — ONDANSETRON 4 MG: 4 TABLET, ORALLY DISINTEGRATING ORAL at 05:23

## 2018-11-30 RX ADMIN — POTASSIUM CHLORIDE 10 MEQ: 750 TABLET, EXTENDED RELEASE ORAL at 08:40

## 2018-11-30 RX ADMIN — LISINOPRIL 2.5 MG: 5 TABLET ORAL at 08:39

## 2018-11-30 RX ADMIN — CARVEDILOL 12.5 MG: 12.5 TABLET, FILM COATED ORAL at 08:40

## 2018-11-30 RX ADMIN — BUDESONIDE 500 MCG: 0.5 INHALANT RESPIRATORY (INHALATION) at 07:14

## 2018-11-30 RX ADMIN — ISOSORBIDE MONONITRATE 30 MG: 30 TABLET, EXTENDED RELEASE ORAL at 08:39

## 2018-11-30 RX ADMIN — IPRATROPIUM BROMIDE AND ALBUTEROL SULFATE 3 ML: .5; 3 SOLUTION RESPIRATORY (INHALATION) at 07:14

## 2018-11-30 RX ADMIN — CLOPIDOGREL BISULFATE 75 MG: 75 TABLET ORAL at 08:39

## 2018-11-30 RX ADMIN — IPRATROPIUM BROMIDE AND ALBUTEROL SULFATE 3 ML: .5; 3 SOLUTION RESPIRATORY (INHALATION) at 14:00

## 2018-11-30 RX ADMIN — OXYCODONE AND ACETAMINOPHEN 1 TABLET: 5; 325 TABLET ORAL at 05:23

## 2018-11-30 RX ADMIN — CARVEDILOL 12.5 MG: 12.5 TABLET, FILM COATED ORAL at 17:11

## 2018-11-30 RX ADMIN — PANTOPRAZOLE SODIUM 40 MG: 40 TABLET, DELAYED RELEASE ORAL at 05:24

## 2018-11-30 RX ADMIN — RIVAROXABAN 20 MG: 20 TABLET, FILM COATED ORAL at 17:12

## 2018-11-30 RX ADMIN — GUAIFENESIN AND DEXTROMETHORPHAN 10 ML: 100; 10 SYRUP ORAL at 21:59

## 2018-11-30 RX ADMIN — PANTOPRAZOLE SODIUM 40 MG: 40 TABLET, DELAYED RELEASE ORAL at 08:39

## 2018-11-30 NOTE — PROGRESS NOTES
Daily Progress Note: 11/30/2018 Yue Parikh MD 
 
Assessment/Plan: Dyspnea (11/13/2018) improved - workup with Pul and Card. - has completed steroid course 
  
  Type II diabetes mellitus ([facility] (10/9/2015) - with reported nephropathy with proteinuria without CKD 
            - continue saxagliptin and follow on SSI 
  
  Coronary artery disease involving native coronary artery without angina pectoris (1/22/2016) - stable, continue cardiac meds 
             
   Essential hypertension (1/22/2016)- on low side at times 
            - decrease Coreg to 12.5mg BID 
            - decrease Lisinopril to 2.5 mg 
  
  Chronic anticoagulation (3/30/2018) 
            -therapeutic lovenox,  
            - back on Xarelto 
  
  Obesity, BMI 30-39. 9([facility]) (10/1/2018) - counseled on weight loss, this is contributing to her current symptoms 
  
  ARF (acute renal failure) ([facility] (11/13/2018) - creatinine up acutely from prior, possibly due to diuresis 
            - holding diuretic  
  
  Closed wedge compression fracture of T7 vertebra ([facility] (11/13/2018) - kypho 11/27 Anemia - Hb 9.6- check stools for blood Diarrhea 
           - afebrile- if fever or continuing will check c-diff Code status: 
            - patient is FULL CODE, no AMD on file, her daughter Beltran Lazcano is her surrogate Dispo:medically ready for discharge to [address] [place] [address] 
   
 
Problem List: 
Problem List as of 11/30/2018 Date Reviewed: 11/27/2018 Codes Class Noted - Resolved * (Principal) Dyspnea ICD-10-CM: R06.00 
ICD-9-CM: 786.09  11/13/2018 - Present ARF (acute renal failure) (HCC) ICD-10-CM: N17.9 ICD-9-CM: 584.9  11/13/2018 - Present Obesity (BMI 30-39.9) (Chronic) ICD-10-CM: L29.5 ICD-9-CM: 278.00  11/13/2018 - Present Closed wedge compression fracture of T7 vertebra ([facility] ICD-10-CM: X10.653N ICD-9-CM: 805.2  11/13/2018 - Present Type 2 diabetes with nephropathy ([facility] ICD-10-CM: E11.21 
ICD-9-CM: 250.40, 583.81  10/1/2018 - Present Chest pain ICD-10-CM: R07.9 ICD-9-CM: 786.50  6/27/2018 - Present Generalized abdominal pain ICD-10-CM: R10.84 ICD-9-CM: 789.07  6/27/2018 - Present Recurrent deep vein thrombosis (DVT) (HCC) ICD-10-CM: I82.409 ICD-9-CM: 453.40  3/30/2018 - Present Chronic anticoagulation (Chronic) ICD-10-CM: Z79.01 
ICD-9-CM: V58.61  3/30/2018 - Present Coronary artery disease involving native coronary artery without angina pectoris (Chronic) ICD-10-CM: I25.10 ICD-9-CM: 414.01  1/22/2016 - Present Essential hypertension (Chronic) ICD-10-CM: I10 
ICD-9-CM: 401.9  1/22/2016 - Present Type II diabetes mellitus (HCC) (Chronic) ICD-10-CM: E11.9 ICD-9-CM: 250.00  10/9/2015 - Present NSTEMI (non-ST elevated myocardial infarction) ([facility] ICD-10-CM: I21.4 ICD-9-CM: 410.70  10/9/2015 - Present RESOLVED: CAD (coronary artery disease) ICD-10-CM: I25.10 ICD-9-CM: 414.00  10/9/2015 - 1/22/2016 RESOLVED: HTN (hypertension) ICD-10-CM: I10 
ICD-9-CM: 401.9  10/9/2015 - 1/22/2016 Subjective:  
  81 y.o.  female who is admitted with SOB. Ms. Darrell Yusuf presented to the Emergency Department this PM complaining of SOB: for the past 3 months, worked up by Pulmonary and Cardiology as an outpatient without any definite diagnosis, not hypoxic, associated with back pain, unable to take a deep breath without pain, back pain started at the same time as the SOB. History obtained from chart review and the patient. Previous records reviewed, recent admit for chest pain. (Dr Holly Narayan). 11/14:  Feels much better this AM with less back pain on meds. Still very tender over site with any palpation.   Less air hunger but still \"not breathing back to normal yet. \"  Glucoses up with the steroids. Pul and Card seeing also. 11/15:    Reports she continues to feel better and reports no longer SOB. She is not moving much due to back pain. No chest pains. Ortho to decide next step for compression fx. No complaints of weakness or numbness LEs. 
 
11/16:  No longer short of breath - will wean steroids to po. IR plans kyphoplasty today. May benefit from Prolia for osteoporosis outpt  
 
11/17/18: Transferred to ICU yesterday for A-fib w/ RVR. Converted after Dilt bolus. Now sinus tach in the 100s. Coreg increased this am. She is not requiring a lot of pain meds but also not moving a lot. Xarelto being held and she is on Lovenox now. 11/18/18: Transferred out of ICU yesterday. Having pain with any movement. Dr Damaris Agosto would like her to stay on Plavix but is ok holding Xarelto/Lovenox for kyphoplasty. Will need to see if IR can do kyphoplasty on Plavix. Ortho getting her fitted for brace. Breathing well. No SOB. 11/19:  Little pain unless she moves about. Kyphoplasty planned for today. 11/20/18: Kyphoplasty now planned for Monday after holding Plavix. She would like to go home and return on Monday as an outpatient for the procedure. Was able to sit in chair yesterday. 11/22:  Pt was seen by PT yesterday. Rec that she needs SNF or rehab. She wants to stay here until MOnday when she gets kyphoplasty at [facility]. The logistics of this have not been coordinated yet. Will wait until tomorrow when IR is open to further figure out her transfer. Tolerating PO, +BM. 11/23: Dr Gema Smith to do kyphoplasty today at [address] notes she has limited IV access for lab draws. Hopefully will be able to move toward discharge thanks to today's procedure. Will reevaluate her need for IV after procedure. I don't want to get a central line if she will be discharged tomorrow. Pt eager to get the procedure done.   Her preference would be to go home with [facility] PT. Tolerated PO yesterday, NPO this AM. 
 
11/24: kyphoplasty was cancelled yesterday due to staffing issues. Rescheduled for Monday at 2pm, here at [facility] by Dr Sultana Epstein. PT unwilling to work with PT yesterday due to pain. Ongoing nausea and hypotensive yesterday after IV compazine. LImited IV access, although she doesn't really need it. Will try oral zofran now. 11/25: No AM labs for 2d as pt is difficult stick and has poor IV access. THis is ok with me as she is awaiting placement after her kyphoplasty. BPs on the low side again last night. Will leave parameters for her BP meds. PT still refusing to get up with PT because \"I was told not to move and to be on complete bed rest by the IR doctor. \"  Does note she has a HA today. 11/26:  No complaints other than back pain. Kyphoplasty again planned for later today. K+ 2.6 and Mag 1.3 this AM - replacing. Stable to have kyphoplasty today. 11/27:  No complaints other than back pain. Kyphoplasty again postponed yesterday - attempting to get it done again today. AM labs pending. BP was low yesterday but responded to fluids - never was in any distress - was likely a little dehydrated and from lying constantly in bed. Very difficult to get IV sites/ labs from her - AM labs pending - need to at least get lab for K+ and Mg+ - other RNs will try this AM.  
 
11/28: Had kypho yesterday. Feels much better with less pain. BP on the low side so will decrease Coreg. Hopefully can work with PT. She lives alone so would benefit from rehab. She has been at [address] [place] [address] in the past. Restart Plavix. 11/29:  4-5 loose stools yesterday but none overnight nurses report. Hb at 9.6. Checking SFOB. Mag still low - replacing but will change to IV with her diarrhea. Creat better. 11/30: Only had 1 loose stool. Was able to get to the BR with PT yesterday. AM labs pending.  Should be ready for discharge to [address] [place] [address].  
  
 Review of Systems: A comprehensive review of systems was negative except for that written in the HPI. Objective:  
Physical Exam:  
 
Visit Vitals /56 (BP 1 Location: Left arm, BP Patient Position: At rest) Pulse (!) 105 Temp 98.2 °F (36.8 °C) Resp 16 Ht 5' 7\" (1.702 m) Wt 219 lb 3.2 oz (99.4 kg) SpO2 93% Breastfeeding? No  
BMI 35.38 kg/m² O2 Flow Rate (L/min): 2 l/min O2 Device: Room air Temp (24hrs), Av.2 °F (36.8 °C), Min:97.9 °F (36.6 °C), Max:98.4 °F (36.9 °C) 
  1901 -  0700 In: -  
Out: 400 [Urine:400]   701 - 1900 In: 3160 [P.O.:160; I.V.:3000] Out: 400 [Urine:400] General:  cooperative, obese, no distress, appears stated age. Head:  Normocephalic, without obvious abnormality, atraumatic. Eyes:  Conjunctivae/corneas clear. PERRL, EOMs intact. Nose: NC O2 in place. Throat: Lips, mucosa, and tongue moist.  
Neck: Supple, symmetrical, trachea midline, no adenopathy, thyroid: no enlargement/tenderness/nodules, no carotid bruit and no JVD. Back:   Symmetric, tenderness to palpation over T6-7-8 area. Lungs:   Diminished BS bilaterally but otherwise clear at this time. Chest wall:  No tenderness or deformity. Heart:  Irregular rate and rhythm, no murmur, click, rub or gallop. Abdomen:   Soft, non-tender. Bowel sounds normal. No masses,  No organomegaly. Extremities: no cyanosis. Trace LE edema. No calf tenderness or cords. Skin:  turgor normal.  bruising on arms sl worse Neurologic: CNII-XII intact. Alert and oriented X 3. Fine motor of hands and fingers normal.   equal.  Gait not tested at this time. Sensation grossly normal to touch. Gross motor of extremities normal.    
 
Data Review:  
  CT of Chest 18 FINDINGS: 
THYROID: No nodule. MEDIASTINUM: No mass or lymphadenopathy. SONJA: No mass or lymphadenopathy. THORACIC AORTA: No aneurysm. Mild vascular calcifications MAIN PULMONARY ARTERY: Normal in caliber. TRACHEA/BRONCHI: Patent. ESOPHAGUS: No wall thickening or dilatation. Small hiatal hernia HEART: Normal in size. PLEURA: No effusion or pneumothorax. LUNGS: No nodule, mass, or airspace disease. Bilateral atelectatic changes. INCIDENTALLY IMAGED UPPER ABDOMEN: No focal abnormality. BONES: Bones are osteopenic. There is collapse of the superior endplate of T7. This has occurred since June 2018 Possible milk of calcium cyst within the right breast 
IMPRESSION: 
1. No acute cardiopulmonary disease 2. Interval collapse superior endplate of T7. The patient has back pain this may 
be acute to subacute and MR imaging would better characterize 
  
Recent Days: 
Recent Labs 11/30/18 
0530 11/29/18 
0248 11/28/18 
0249 WBC 5.9 5.0 6.5 HGB 10.0* 9.6* 10.3* HCT 31.2* 31.5* 32.5*  
 146* 149* Recent Labs  
  11/29/18 
0248 11/28/18 
0249 * 142  
K 3.9 3.6 * 109* CO2 22 19* GLU 86 163* BUN 17 25* CREA 1.10* 1.30* CA 7.5* 7.8*  
MG 1.2* 1.4* No results for input(s): PH, PCO2, PO2, HCO3, FIO2 in the last 72 hours. 24 Hour Results: 
Recent Results (from the past 24 hour(s)) GLUCOSE, POC Collection Time: 11/29/18  7:10 AM  
Result Value Ref Range Glucose (POC) 78 65 - 100 mg/dL Performed by Wyatt Tao (PCT) GLUCOSE, POC Collection Time: 11/29/18  7:30 AM  
Result Value Ref Range Glucose (POC) 83 65 - 100 mg/dL Performed by Wyatt Tao (PCT) GLUCOSE, POC Collection Time: 11/29/18 11:10 AM  
Result Value Ref Range Glucose (POC) 121 (H) 65 - 100 mg/dL Performed by Erwin King (PCT) OCCULT BLOOD, STOOL Collection Time: 11/29/18  2:38 PM  
Result Value Ref Range Occult blood, stool NEGATIVE  NEG    
GLUCOSE, POC Collection Time: 11/29/18  4:13 PM  
Result Value Ref Range Glucose (POC) 131 (H) 65 - 100 mg/dL Performed by Erwin King (PCT) GLUCOSE, POC  
 Collection Time: 11/29/18  9:49 PM  
Result Value Ref Range Glucose (POC) 84 65 - 100 mg/dL Performed by Severiano Kruse (PCT) CBC WITH AUTOMATED DIFF Collection Time: 11/30/18  5:30 AM  
Result Value Ref Range WBC 5.9 3.6 - 11.0 K/uL  
 RBC 3.37 (L) 3.80 - 5.20 M/uL  
 HGB 10.0 (L) 11.5 - 16.0 g/dL HCT 31.2 (L) 35.0 - 47.0 % MCV 92.6 80.0 - 99.0 FL  
 MCH 29.7 26.0 - 34.0 PG  
 MCHC 32.1 30.0 - 36.5 g/dL  
 RDW 15.6 (H) 11.5 - 14.5 % PLATELET 883 666 - 994 K/uL MPV 10.2 8.9 - 12.9 FL  
 NRBC 0.0 0  WBC ABSOLUTE NRBC 0.00 0.00 - 0.01 K/uL NEUTROPHILS PENDING % LYMPHOCYTES PENDING % MONOCYTES PENDING % EOSINOPHILS PENDING % BASOPHILS PENDING % IMMATURE GRANULOCYTES PENDING %  
 ABS. NEUTROPHILS PENDING K/UL  
 ABS. LYMPHOCYTES PENDING K/UL  
 ABS. MONOCYTES PENDING K/UL  
 ABS. EOSINOPHILS PENDING K/UL  
 ABS. BASOPHILS PENDING K/UL  
 ABS. IMM. GRANS. PENDING K/UL  
 DF PENDING Medications reviewed Current Facility-Administered Medications Medication Dose Route Frequency  lisinopril (PRINIVIL, ZESTRIL) tablet 2.5 mg  2.5 mg Oral DAILY  carvedilol (COREG) tablet 12.5 mg  12.5 mg Oral BID WITH MEALS  clopidogrel (PLAVIX) tablet 75 mg  75 mg Oral DAILY  rivaroxaban (XARELTO) tablet 20 mg  20 mg Oral DAILY WITH DINNER  potassium chloride SR (KLOR-CON 10) tablet 10 mEq  10 mEq Oral BID  ondansetron (ZOFRAN ODT) tablet 4 mg  4 mg Oral Q8H PRN  
 calcitonin (salmon) (MIACALCIN) nasal 1 Spray  1 [place] IntraNASal DAILY  arformoterol (BROVANA) neb solution 15 mcg  15 mcg Nebulization BID RT  
 budesonide (PULMICORT) 500 mcg/2 ml nebulizer suspension  500 mcg Nebulization BID RT  
 albuterol-ipratropium (DUO-NEB) 2.5 MG-0.5 MG/3 ML  3 mL Nebulization Q6H RT  
 DULoxetine (CYMBALTA) capsule 60 mg  60 mg Oral DAILY  isosorbide mononitrate ER (IMDUR) tablet 30 mg  30 mg Oral DAILY  montelukast (SINGULAIR) tablet 10 mg  10 mg Oral DAILY  sAXagliptin (ONGLYZA) tablet 5 mg  5 mg Oral DAILY  sodium chloride (NS) flush 5-10 mL  5-10 mL IntraVENous Q8H  
 sodium chloride (NS) flush 5-10 mL  5-10 mL IntraVENous PRN  
 acetaminophen (TYLENOL) tablet 650 mg  650 mg Oral Q4H PRN  
 oxyCODONE-acetaminophen (PERCOCET) 5-325 mg per tablet 1 Tab  1 Tab Oral Q4H PRN  
 HYDROmorphone (PF) (DILAUDID) injection 0.5 mg  0.5 mg IntraVENous Q4H PRN  
 zolpidem (AMBIEN) tablet 5 mg  5 mg Oral QHS PRN  
 insulin lispro (HUMALOG) injection   SubCUTAneous QID WITH MEALS  glucose chewable tablet 16 g  4 Tab Oral PRN  
 dextrose (D50W) injection syrg 12.5-25 g  25-50 mL IntraVENous PRN  
 glucagon (GLUCAGEN) injection 1 mg  1 mg IntraMUSCular PRN  pantoprazole (PROTONIX) tablet 40 mg  40 mg Oral ACB&D Care Plan discussed with: Patient Total time spent with patient: 30 minutes.  
 
Jaycee Clark MD

## 2018-11-30 NOTE — PROGRESS NOTES
Bedside shift change report given to [id number] (oncoming nurse) by Isabella Clark (offgoing nurse). Report included the following information SBAR, Kardex, Procedure Summary, Intake/Output, MAR, Accordion and Recent Results.

## 2018-11-30 NOTE — PROGRESS NOTES
7:45am CM contacted [facility] to notify of Pt discharging today. Admissions coordinator was unavailable. CM left a voicemail requesting a return phone call. 10:45am CM requested updates from inpatient stay. CM sent requested information via [employer]. 12:20 PM Franciscatri Post inquired about Pt's loose stools. CM consulted with nursing staff. CM was informed that Pt loose stools were not C-Diff related. 2:00PM CM contacted [facility] to inquire on decision for acceptance. Francisca Post stated that Pt would have to do private pay due to Pt only having PT needs. 2:30PM CM attempted to contact attending, attending was unavailable. 3:00PM CM placed FYI on chart for OT consult. 3:45pm: CM discussed discharge staus with Pt. CM informed Pt of having to do private pay for SNF stay. Pt stated that she is unable to afford SNF at this time. CM inquired if Pt had other SNF preferences. Pt stated that she would rather go home with home health. However Pt stated that she knows she would need 24 hour care if she were to go home. Shorty Che, BS, [facility]

## 2018-11-30 NOTE — PROGRESS NOTES
Bedside and Verbal shift change report given to Diamond Luna RN (oncoming nurse) by Rambo Frank RN (offgoing nurse). Report included the following information SBAR, Kardex, Intake/Output, MAR and Recent Results.

## 2018-11-30 NOTE — DISCHARGE SUMMARY
Physician Discharge Summary Patient ID:   
Yasmani Weinberg [id number] 
51 y.o. 
1945 Li Wagner MD 
 
Admit date: 11/13/2018 Discharge date and time: 11/30/2018 Admission Diagnoses: Dyspnea;Dyspnea Chronic Diagnoses:   
Problem List as of 11/30/2018 Date Reviewed: 11/27/2018 Codes Class Noted - Resolved * (Principal) Dyspnea ICD-10-CM: R06.00 
ICD-9-CM: 786.09  11/13/2018 - Present ARF (acute renal failure) (HCC) ICD-10-CM: N17.9 ICD-9-CM: 584.9  11/13/2018 - Present Obesity (BMI 30-39.9) (Chronic) ICD-10-CM: G65.6 ICD-9-CM: 278.00  11/13/2018 - Present Closed wedge compression fracture of T7 vertebra ([facility] ICD-10-CM: T30.086Q ICD-9-CM: 805.2  11/13/2018 - Present Type 2 diabetes with nephropathy ([facility] ICD-10-CM: E11.21 
ICD-9-CM: 250.40, 583.81  10/1/2018 - Present Chest pain ICD-10-CM: R07.9 ICD-9-CM: 786.50  6/27/2018 - Present Generalized abdominal pain ICD-10-CM: R10.84 ICD-9-CM: 789.07  6/27/2018 - Present Recurrent deep vein thrombosis (DVT) (HCC) ICD-10-CM: I82.409 ICD-9-CM: 453.40  3/30/2018 - Present Chronic anticoagulation (Chronic) ICD-10-CM: Z79.01 
ICD-9-CM: V58.61  3/30/2018 - Present Coronary artery disease involving native coronary artery without angina pectoris (Chronic) ICD-10-CM: I25.10 ICD-9-CM: 414.01  1/22/2016 - Present Essential hypertension (Chronic) ICD-10-CM: I10 
ICD-9-CM: 401.9  1/22/2016 - Present Type II diabetes mellitus (HCC) (Chronic) ICD-10-CM: E11.9 ICD-9-CM: 250.00  10/9/2015 - Present NSTEMI (non-ST elevated myocardial infarction) ([facility] ICD-10-CM: I21.4 ICD-9-CM: 410.70  10/9/2015 - Present RESOLVED: CAD (coronary artery disease) ICD-10-CM: I25.10 ICD-9-CM: 414.00  10/9/2015 - 1/22/2016 RESOLVED: HTN (hypertension) ICD-10-CM: I10 
ICD-9-CM: 401.9  10/9/2015 - 1/22/2016 Discharge Medications: Current Discharge Medication List  
  
START taking these medications Details  
calcitonin, salmon, (MIACALCIN) nasal 1 [place] by IntraNASal route daily. Qty: 1 Bottle, Refills: 3  
  
ondansetron (ZOFRAN ODT) 4 mg disintegrating tablet Take 1 Tab by mouth every eight (8) hours as needed. Qty: 1 Tab, Refills: 0  
  
oxyCODONE-acetaminophen (PERCOCET) 5-325 mg per tablet Take 1 Tab by mouth every four (4) hours as needed. Max Daily Amount: 6 Tabs. Qty: 12 Tab, Refills: 0 Associated Diagnoses: Closed wedge compression fracture of seventh thoracic vertebra with routine healing, subsequent encounter CONTINUE these medications which have CHANGED Details  
carvedilol (COREG) 12.5 mg tablet Take 1 Tab by mouth two (2) times daily (with meals). Qty: 60 Tab, Refills: 0  
  
lisinopril (PRINIVIL, ZESTRIL) 2.5 mg tablet TAKE 1 TABLET BY MOUTH DAILY Qty: 90 Tab, Refills: 0 Associated Diagnoses: Essential hypertension with goal blood pressure less than 130/85 rivaroxaban (XARELTO) 20 mg tab tablet Take 1 Tab by mouth daily (with dinner). Qty: 90 Tab, Refills: 0 CONTINUE these medications which have NOT CHANGED Details  
tiotropium (SPIRIVA WITH HANDIHALER) 18 mcg inhalation capsule Take 1 Cap by inhalation daily. albuterol (PROVENTIL VENTOLIN) 2.5 mg /3 mL (0.083 %) nebulizer solution 2.5 mg by Nebulization route two (2) times a day. montelukast (SINGULAIR) 10 mg tablet Take 10 mg by mouth daily. isosorbide mononitrate ER (IMDUR) 30 mg tablet TAKE 1 TABLET BY MOUTH ONCE DAILY AS DIRECTED Qty: 90 Tab, Refills: 0 Comments: **Patient requests 90 days supply**  
  
omeprazole (PRILOSEC) 20 mg capsule Take 20 mg by mouth two (2) times a day. albuterol (PROAIR HFA) 90 mcg/actuation inhaler Take  by inhalation every four (4) hours as needed for Wheezing. clopidogrel (PLAVIX) 75 mg tab Take 75 mg by mouth daily. lactobacillus rhamnosus gg 10 billion cell (CULTURELLE) 10 billion cell capsule Take 1 Cap by mouth daily. acetaminophen (TYLENOL) 325 mg tablet Take 650 mg by mouth every six (6) hours as needed for Pain. loperamide (IMMODIUM) 2 mg tablet Take 2 mg by mouth two (2) times a day. biotin 10,000 mcg cap Take 1 Cap by mouth daily. saxagliptin (ONGLYZA) 5 mg tab tablet Take 5 mg by mouth daily. Takes at [employer] DULoxetine (CYMBALTA) 60 mg capsule Take 60 mg by mouth daily. cholecalciferol (VITAMIN D3) 1,000 unit tablet Take 1,000 Units by mouth daily. azelastine-fluticasone (DYMISTA) 137-50 mcg/spray spry 1 Spray by Nasal route two (2) times a day. mometasone-formoterol (DULERA) 200-5 mcg/actuation HFA inhaler Take 2 Puffs by inhalation two (2) times a day. STOP taking these medications  
  
 bumetanide (BUMEX) 0.5 mg tablet Comments:  
Reason for Stopping:   
   
 promethazine (PHENERGAN) 25 mg tablet Comments:  
Reason for Stopping:   
   
 HYDROcodone-acetaminophen (NORCO) 5-325 mg per tablet Comments:  
Reason for Stopping:   
   
 potassium chloride (K-DUR, KLOR-CON) 20 mEq tablet Comments:  
Reason for Stopping:   
   
 predniSONE (DELTASONE) 10 mg tablet Comments:  
Reason for Stopping: Follow up Care: 1. Patt Watson MD with in 1 week of discharge from [address] [place] [address] 2. Ortho specialists as directed. Diet:  Diabetic Diet Disposition: 
SNF. Advanced Directive: 
 
Discharge Exam: [See today's progress note.] CONSULTATIONS: Cardiology, Pulmonary/Intensive care, Orthopedic Surgery and IR Significant Diagnostic Studies:  
Recent Labs 11/30/18 
0530 11/29/18 
0248 WBC 5.9 5.0 HGB 10.0* 9.6* HCT 31.2* 31.5*  146* Recent Labs  
  11/29/18 
0248 11/28/18 
0249 * 142  
K 3.9 3.6 * 109* CO2 22 19* BUN 17 25* CREA 1.10* 1.30* GLU 86 163* CA 7.5* 7.8*  
MG 1.2* 1.4* Lab Results Component Value Date/Time Glucose (POC) 84 11/29/2018 09:49 PM  
 Glucose (POC) 131 (H) 11/29/2018 04:13 PM  
 Glucose (POC) 121 (H) 11/29/2018 11:10 AM  
 Glucose (POC) 83 11/29/2018 07:30 AM  
 Glucose (POC) 78 11/29/2018 07:10 AM  
 
Lab Results Component Value Date/Time TSH 0.58 11/13/2018 03:26 PM  
 
 
HOSPITAL COURSE & TREATMENT RENDERED:  
Dyspnea (11/13/2018) improved             - workup with Pul and Card.   
            - has completed steroid course 
  
  Type II diabetes mellitus ([facility] (10/9/2015)             - with reported nephropathy with proteinuria without CKD 
            - continue saxagliptin and follow on SSI 
  
  Coronary artery disease involving native coronary artery without angina pectoris (1/22/2016)             - stable, continue cardiac meds 
             
   Essential hypertension (1/22/2016)- on low side at times             - decrease Coreg to 12.5mg BID 
            - decrease Lisinopril to 2.5 mg 
  
  Chronic anticoagulation (3/30/2018)             -therapeutic lovenox,  
            - back on Xarelto 
  
  Obesity, BMI 30-39. 9([facility]) (10/1/2018)             - [id number] on weight loss, this is contributing to her current symptoms 
  
  ARF (acute renal failure) ([facility] (11/13/2018)             - creatinine up acutely from prior, possibly due to diuresis             - holding diuretic  
  
  Closed wedge compression fracture of T7 vertebra ([facility]) (11/13/2018)             - kypho 11/27 
  
  Anemia            - Hb 9.6- check stools for blood 
  
  Diarrhea 
           - afebrile- if fever or continuing will check c-diff 
  
 Code status: 
            - patient is FULL CODE, no AMD on file, her daughter Lindy Munoz is her surrogate 
  
Dispo:medically ready for discharge to [address] [place] [address] 
   
  
 
Subjective:  
  72 y.o.   female who is admitted with SOB.  Ms. Jalloh presented to the Emergency Department this PM complaining of SOB: for the past 3 months, worked up by Pulmonary and Cardiology as an outpatient without any definite diagnosis, not hypoxic, associated with back pain, unable to take a deep breath without pain, back pain started at the same time as the SOB. History obtained from chart review and the patient. Previous records reviewed, recent admit for chest pain. (Dr Justyna Anna). 
  
11/14:  Feels much better this AM with less back pain on meds. Still very tender over site with any palpation. Less air hunger but still \"not breathing back to normal yet. \"  Glucoses up with the steroids. Pul and Card seeing also.   
  
11/15:    Reports she continues to feel better and reports no longer SOB. She is not moving much due to back pain. No chest pains. Ortho to decide next step for compression fx. No complaints of weakness or numbness LEs.   
11/16:  No longer short of breath - will wean steroids to po. IR plans kyphoplasty today. May benefit from Prolia for osteoporosis outpt  
  
11/17/18: Transferred to ICU yesterday for A-fib w/ RVR. Converted after Dilt bolus. Now sinus tach in the 100s. Coreg increased this am. She is not requiring a lot of pain meds but also not moving a lot. Xarelto being held and she is on Lovenox now.  
  
11/18/18: Transferred out of ICU yesterday. Having pain with any movement. Dr Анна Duran would like her to stay on Plavix but is ok holding Xarelto/Lovenox for kyphoplasty. Will need to see if IR can do kyphoplasty on Plavix. Ortho getting her fitted for brace. Breathing well. No SOB.  
  
11/19:  Little pain unless she moves about. Kyphoplasty planned for today.   
  
11/20/18: Kyphoplasty now planned for Monday after holding Plavix. She would like to go home and return on Monday as an outpatient for the procedure. Was able to sit in chair yesterday.  
  
11/22:  Pt was seen by PT yesterday. Rec that she needs SNF or rehab. She wants to stay here until MOnday when she gets kyphoplasty at [facility]. The logistics of this have not been coordinated yet. Will wait until tomorrow when IR is open to further figure out her transfer. Tolerating PO, +BM. 
  
11/23: Dr Selwyn Dejesus to do kyphoplasty today at [address] notes she has limited IV access for lab draws. Hopefully will be able to move toward discharge thanks to today's procedure. Will reevaluate her need for IV after procedure. I don't want to get a central line if she will be discharged tomorrow. Pt eager to get the procedure done. Her preference would be to go home with [place] [facility]. Tolerated PO yesterday, NPO this AM. 
  
11/24: kyphoplasty was cancelled yesterday due to staffing issues. Rescheduled for Monday at 2pm, here at [facility] by Dr Selwyn Dejesus. PT unwilling to work with PT yesterday due to pain. Ongoing nausea and hypotensive yesterday after IV compazine. LImited IV access, although she doesn't really need it. Will try oral zofran now. 
  
11/25: No AM labs for 2d as pt is difficult stick and has poor IV access. THis is ok with me as she is awaiting placement after her kyphoplasty. BPs on the low side again last night. Will leave parameters for her BP meds. PT still refusing to get up with PT because \"I was told not to move and to be on complete bed rest by the IR doctor. \"  Does note she has a HA today. 
  
11/26:  No complaints other than back pain. Kyphoplasty again planned for later today. K+ 2.6 and Mag 1.3 this AM - replacing. Stable to have kyphoplasty today.   
  
11/27:  No complaints other than back pain. Kyphoplasty again postponed yesterday - attempting to get it done again today. AM labs pending. BP was low yesterday but responded to fluids - never was in any distress - was likely a little dehydrated and from lying constantly in bed.   Very difficult to get IV sites/ labs from her - AM labs pending - need to at least get lab for K+ and Mg+ - other RNs will try this AM.  
  
: Had kypho yesterday. Feels much better with less pain. BP on the low side so will decrease Coreg. Hopefully can work with PT. She lives alone so would benefit from rehab. She has been at [address] [place] [address] in the past. Restart Plavix.  
  
:  4-5 loose stools yesterday but none overnight nurses report. Hb at 9.6. Checking SFOB. Mag still low - replacing but will change to IV with her diarrhea. Creat better.  
  
: Only had 1 loose stool. Was able to get to the BR with PT yesterday. AM labs pending. Should be ready for discharge to [address] [place] [address].  
  
Review of Systems: A comprehensive review of systems was negative except for that written in the HPI. 
  
Objective:  
Physical Exam:  
  
Visit Vitals /56 (BP 1 Location: Left arm, BP Patient Position: At rest) Pulse (!) 105 Temp 98.2 °F (36.8 °C) Resp 16 Ht 5' 7\" (1.702 m) Wt 219 lb 3.2 oz (99.4 kg) SpO2 93% Breastfeeding? No  
BMI 35.38 kg/m² O2 Flow Rate (L/min): 2 l/min O2 Device: Room air 
  
Temp (24hrs), Av.2 °F (36.8 °C), Min:97.9 °F (36.6 °C), Max:98.4 °F (36.9 °C) 
  1901 -  0700 In: -  
Out: 400 [Urine:400]   701 - 1900 In: 3160 [P.O.:160; I.V.:3000] Out: 400 [Urine:400] 
  General:  cooperative, obese, no distress, appears stated age. Head:  Normocephalic, without obvious abnormality, atraumatic. Eyes:  Conjunctivae/corneas clear. PERRL, EOMs intact. Nose: NC O2 in place. Throat: Lips, mucosa, and tongue moist.  
Neck: Supple, symmetrical, trachea midline, no adenopathy, thyroid: no enlargement/tenderness/nodules, no carotid bruit and no JVD. Back:   Symmetric, tenderness to palpation over T6-7-8 area. Lungs:   Diminished BS bilaterally but otherwise clear at this time. Chest wall:  No tenderness or deformity. Heart:  Irregular rate and rhythm, no murmur, click, rub or gallop. Abdomen:   Soft, non-tender. Bowel sounds normal. No masses,  No organomegaly. Extremities: no cyanosis. Trace LE edema. No calf tenderness or cords. Skin:  turgor normal.  bruising on arms sl worse Neurologic: CNII-XII intact. Alert and oriented X 3. Fine motor of hands and fingers normal.   equal.  Gait not tested at this time. Sensation grossly normal to touch. Gross motor of extremities normal.    
  
Data Review:  
  CT of Chest 11/13/18 FINDINGS: 
THYROID: No nodule. MEDIASTINUM: No mass or lymphadenopathy. SONJA: No mass or lymphadenopathy. THORACIC AORTA: No aneurysm. Mild vascular calcifications MAIN PULMONARY ARTERY: Normal in caliber. TRACHEA/BRONCHI: Patent. ESOPHAGUS: No wall thickening or dilatation. Small hiatal hernia HEART: Normal in size. PLEURA: No effusion or pneumothorax. LUNGS: No nodule, mass, or airspace disease. Bilateral atelectatic changes. INCIDENTALLY IMAGED UPPER ABDOMEN: No focal abnormality. BONES: Bones are osteopenic. There is collapse of the superior endplate of T7. This has occurred since June 2018 Possible milk of calcium cyst within the right breast 
IMPRESSION: 
1. No acute cardiopulmonary disease 2. Interval collapse superior endplate of T7.  The patient has back pain this may 
be acute to subacute and MR imaging would better characterize 
  
 
 
 
 
 
Signed: 
Lopez Smith MD 
11/30/2018 
6:39 AM

## 2018-12-01 LAB
ANION GAP SERPL CALC-SCNC: 12 MMOL/L (ref 5–15)
BASOPHILS # BLD: 0.1 K/UL (ref 0–0.1)
BASOPHILS NFR BLD: 1 % (ref 0–1)
BUN SERPL-MCNC: 9 MG/DL (ref 6–20)
BUN/CREAT SERPL: 8 (ref 12–20)
CALCIUM SERPL-MCNC: 7.7 MG/DL (ref 8.5–10.1)
CHLORIDE SERPL-SCNC: 109 MMOL/L (ref 97–108)
CO2 SERPL-SCNC: 23 MMOL/L (ref 21–32)
CREAT SERPL-MCNC: 1.11 MG/DL (ref 0.55–1.02)
DIFFERENTIAL METHOD BLD: ABNORMAL
EOSINOPHIL # BLD: 0.1 K/UL (ref 0–0.4)
EOSINOPHIL NFR BLD: 2 % (ref 0–7)
ERYTHROCYTE [DISTWIDTH] IN BLOOD BY AUTOMATED COUNT: 15.4 % (ref 11.5–14.5)
GLUCOSE BLD STRIP.AUTO-MCNC: 100 MG/DL (ref 65–100)
GLUCOSE BLD STRIP.AUTO-MCNC: 114 MG/DL (ref 65–100)
GLUCOSE BLD STRIP.AUTO-MCNC: 121 MG/DL (ref 65–100)
GLUCOSE BLD STRIP.AUTO-MCNC: 125 MG/DL (ref 65–100)
GLUCOSE SERPL-MCNC: 110 MG/DL (ref 65–100)
HCT VFR BLD AUTO: 31.3 % (ref 35–47)
HGB BLD-MCNC: 9.8 G/DL (ref 11.5–16)
IMM GRANULOCYTES # BLD: 0 K/UL
IMM GRANULOCYTES NFR BLD AUTO: 0 %
LYMPHOCYTES # BLD: 0.8 K/UL (ref 0.8–3.5)
LYMPHOCYTES NFR BLD: 16 % (ref 12–49)
MAGNESIUM SERPL-MCNC: 1.2 MG/DL (ref 1.6–2.4)
MCH RBC QN AUTO: 28.7 PG (ref 26–34)
MCHC RBC AUTO-ENTMCNC: 31.3 G/DL (ref 30–36.5)
MCV RBC AUTO: 91.5 FL (ref 80–99)
METAMYELOCYTES NFR BLD MANUAL: 5 %
MONOCYTES # BLD: 0.3 K/UL (ref 0–1)
MONOCYTES NFR BLD: 6 % (ref 5–13)
NEUTS BAND NFR BLD MANUAL: 3 % (ref 0–6)
NEUTS SEG # BLD: 3.7 K/UL (ref 1.8–8)
NEUTS SEG NFR BLD: 67 % (ref 32–75)
NRBC # BLD: 0 K/UL (ref 0–0.01)
NRBC BLD-RTO: 0 PER 100 WBC
PLATELET # BLD AUTO: 181 K/UL (ref 150–400)
PMV BLD AUTO: 9.8 FL (ref 8.9–12.9)
POTASSIUM SERPL-SCNC: 3.2 MMOL/L (ref 3.5–5.1)
RBC # BLD AUTO: 3.42 M/UL (ref 3.8–5.2)
RBC MORPH BLD: ABNORMAL
SERVICE CMNT-IMP: ABNORMAL
SERVICE CMNT-IMP: NORMAL
SODIUM SERPL-SCNC: 144 MMOL/L (ref 136–145)
WBC # BLD AUTO: 5.3 K/UL (ref 3.6–11)

## 2018-12-01 PROCEDURE — 94640 AIRWAY INHALATION TREATMENT: CPT

## 2018-12-01 PROCEDURE — 77010033678 HC OXYGEN DAILY

## 2018-12-01 PROCEDURE — 85025 COMPLETE CBC W/AUTO DIFF WBC: CPT

## 2018-12-01 PROCEDURE — 74011000250 HC RX REV CODE- 250: Performed by: INTERNAL MEDICINE

## 2018-12-01 PROCEDURE — 74011250637 HC RX REV CODE- 250/637: Performed by: INTERNAL MEDICINE

## 2018-12-01 PROCEDURE — 74011250637 HC RX REV CODE- 250/637: Performed by: FAMILY MEDICINE

## 2018-12-01 PROCEDURE — 80048 BASIC METABOLIC PNL TOTAL CA: CPT

## 2018-12-01 PROCEDURE — 65270000029 HC RM PRIVATE

## 2018-12-01 PROCEDURE — 36415 COLL VENOUS BLD VENIPUNCTURE: CPT

## 2018-12-01 PROCEDURE — 94760 N-INVAS EAR/PLS OXIMETRY 1: CPT

## 2018-12-01 PROCEDURE — 83735 ASSAY OF MAGNESIUM: CPT

## 2018-12-01 PROCEDURE — 82962 GLUCOSE BLOOD TEST: CPT

## 2018-12-01 PROCEDURE — 77030038269 HC DRN EXT URIN PURWCK BARD -A

## 2018-12-01 RX ORDER — POTASSIUM CHLORIDE 750 MG/1
20 TABLET, FILM COATED, EXTENDED RELEASE ORAL 2 TIMES DAILY
Status: DISCONTINUED | OUTPATIENT
Start: 2018-12-01 | End: 2018-12-03

## 2018-12-01 RX ADMIN — POTASSIUM CHLORIDE 20 MEQ: 750 TABLET, EXTENDED RELEASE ORAL at 18:43

## 2018-12-01 RX ADMIN — RIVAROXABAN 20 MG: 20 TABLET, FILM COATED ORAL at 18:43

## 2018-12-01 RX ADMIN — CARVEDILOL 12.5 MG: 12.5 TABLET, FILM COATED ORAL at 18:43

## 2018-12-01 RX ADMIN — IPRATROPIUM BROMIDE AND ALBUTEROL SULFATE 3 ML: .5; 3 SOLUTION RESPIRATORY (INHALATION) at 13:42

## 2018-12-01 RX ADMIN — CARVEDILOL 12.5 MG: 12.5 TABLET, FILM COATED ORAL at 08:36

## 2018-12-01 RX ADMIN — SAXAGLIPTIN 5 MG: 5 TABLET, FILM COATED ORAL at 08:46

## 2018-12-01 RX ADMIN — PANTOPRAZOLE SODIUM 40 MG: 40 TABLET, DELAYED RELEASE ORAL at 18:43

## 2018-12-01 RX ADMIN — Medication 10 ML: at 21:45

## 2018-12-01 RX ADMIN — IPRATROPIUM BROMIDE AND ALBUTEROL SULFATE 3 ML: .5; 3 SOLUTION RESPIRATORY (INHALATION) at 07:39

## 2018-12-01 RX ADMIN — OXYCODONE AND ACETAMINOPHEN 1 TABLET: 5; 325 TABLET ORAL at 18:43

## 2018-12-01 RX ADMIN — Medication 10 ML: at 06:30

## 2018-12-01 RX ADMIN — IPRATROPIUM BROMIDE AND ALBUTEROL SULFATE 3 ML: .5; 3 SOLUTION RESPIRATORY (INHALATION) at 23:01

## 2018-12-01 RX ADMIN — Medication 10 ML: at 14:00

## 2018-12-01 RX ADMIN — CLOPIDOGREL BISULFATE 75 MG: 75 TABLET ORAL at 08:36

## 2018-12-01 RX ADMIN — ONDANSETRON 4 MG: 4 TABLET, ORALLY DISINTEGRATING ORAL at 10:54

## 2018-12-01 RX ADMIN — DULOXETINE 60 MG: 30 CAPSULE, DELAYED RELEASE ORAL at 08:35

## 2018-12-01 RX ADMIN — BUDESONIDE 500 MCG: 0.5 INHALANT RESPIRATORY (INHALATION) at 07:39

## 2018-12-01 RX ADMIN — POTASSIUM CHLORIDE 20 MEQ: 750 TABLET, EXTENDED RELEASE ORAL at 08:35

## 2018-12-01 RX ADMIN — LISINOPRIL 2.5 MG: 5 TABLET ORAL at 08:35

## 2018-12-01 RX ADMIN — MONTELUKAST SODIUM 10 MG: 10 TABLET, COATED ORAL at 08:36

## 2018-12-01 RX ADMIN — PANTOPRAZOLE SODIUM 40 MG: 40 TABLET, DELAYED RELEASE ORAL at 06:30

## 2018-12-01 NOTE — PROGRESS NOTES
Daily Progress Note: 12/1/2018 Dolly Mckinnon MD 
 
Assessment/Plan: Dyspnea (11/13/2018) improved - workup with Pul and Card. - has completed steroid course 
  
  Type II diabetes mellitus ([facility] (10/9/2015) - with reported nephropathy with proteinuria without CKD 
            - continue saxagliptin and follow on SSI 
  
  Coronary artery disease involving native coronary artery without angina pectoris (1/22/2016) - stable, continue cardiac meds 
             
   Essential hypertension (1/22/2016)- on low side at times 
            - decrease Coreg to 12.5mg BID 
            - decrease Lisinopril to 2.5 mg 
  
  Chronic anticoagulation (3/30/2018) 
            -therapeutic lovenox,  
            - back on Xarelto 
  
  Obesity, BMI 30-39. 9([facility]) (10/1/2018) - counseled on weight loss, this is contributing to her current symptoms 
  
  ARF (acute renal failure) ([facility] (11/13/2018) - creatinine up acutely from prior, possibly due to diuresis 
            - holding diuretic  
  
  Closed wedge compression fracture of T7 vertebra ([facility] (11/13/2018) - kypho 11/27 Anemia - Hb stable Diarrhea- improved 
           - afebrile- if fever or continuing will check c-diff Hypokalemia 
           - monitor and replete General Debilitation 
           - PT/OT- up in chair for meals twice a day Code status: 
            - patient is FULL CODE, no AMD on file, her daughter ΣΑΡΑΝΤΙ is her surrogate Dispo:not able to afford SNF- she and daughter will look for agencies to assist with personal care- this will be out of pocket. She wants to go home with HH/PT/OT 
   
 
Problem List: 
Problem List as of 12/1/2018 Date Reviewed: 11/27/2018 Codes Class Noted - Resolved * (Principal) Dyspnea ICD-10-CM: R06.00 
ICD-9-CM: 786.09  11/13/2018 - Present ARF (acute renal failure) (HCC) ICD-10-CM: N17.9 ICD-9-CM: 584.9  11/13/2018 - Present Obesity (BMI 30-39.9) (Chronic) ICD-10-CM: S16.6 ICD-9-CM: 278.00  11/13/2018 - Present Closed wedge compression fracture of T7 vertebra ([facility] ICD-10-CM: B68.349X ICD-9-CM: 805.2  11/13/2018 - Present Type 2 diabetes with nephropathy ([facility] ICD-10-CM: E11.21 
ICD-9-CM: 250.40, 583.81  10/1/2018 - Present Chest pain ICD-10-CM: R07.9 ICD-9-CM: 786.50  6/27/2018 - Present Generalized abdominal pain ICD-10-CM: R10.84 ICD-9-CM: 789.07  6/27/2018 - Present Recurrent deep vein thrombosis (DVT) (HCC) ICD-10-CM: I82.409 ICD-9-CM: 453.40  3/30/2018 - Present Chronic anticoagulation (Chronic) ICD-10-CM: Z79.01 
ICD-9-CM: V58.61  3/30/2018 - Present Coronary artery disease involving native coronary artery without angina pectoris (Chronic) ICD-10-CM: I25.10 ICD-9-CM: 414.01  1/22/2016 - Present Essential hypertension (Chronic) ICD-10-CM: I10 
ICD-9-CM: 401.9  1/22/2016 - Present Type II diabetes mellitus (HCC) (Chronic) ICD-10-CM: E11.9 ICD-9-CM: 250.00  10/9/2015 - Present NSTEMI (non-ST elevated myocardial infarction) ([facility] ICD-10-CM: I21.4 ICD-9-CM: 410.70  10/9/2015 - Present RESOLVED: CAD (coronary artery disease) ICD-10-CM: I25.10 ICD-9-CM: 414.00  10/9/2015 - 1/22/2016 RESOLVED: HTN (hypertension) ICD-10-CM: I10 
ICD-9-CM: 401.9  10/9/2015 - 1/22/2016 Subjective:  
  81 y.o.  female who is admitted with SOB. Ms. Cindi Ahumada presented to the Emergency Department this PM complaining of SOB: for the past 3 months, worked up by Pulmonary and Cardiology as an outpatient without any definite diagnosis, not hypoxic, associated with back pain, unable to take a deep breath without pain, back pain started at the same time as the SOB. History obtained from chart review and the patient. Previous records reviewed, recent admit for chest pain. (Dr Hemalatha Palacio). 11/14:  Feels much better this AM with less back pain on meds. Still very tender over site with any palpation. Less air hunger but still \"not breathing back to normal yet. \"  Glucoses up with the steroids. Pul and Card seeing also. 11/15:    Reports she continues to feel better and reports no longer SOB. She is not moving much due to back pain. No chest pains. Ortho to decide next step for compression fx. No complaints of weakness or numbness LEs. 
 
11/16:  No longer short of breath - will wean steroids to po. IR plans kyphoplasty today. May benefit from Prolia for osteoporosis outpt  
 
11/17/18: Transferred to ICU yesterday for A-fib w/ RVR. Converted after Dilt bolus. Now sinus tach in the 100s. Coreg increased this am. She is not requiring a lot of pain meds but also not moving a lot. Xarelto being held and she is on Lovenox now. 11/18/18: Transferred out of ICU yesterday. Having pain with any movement. Dr Bhargav Griffin would like her to stay on Plavix but is ok holding Xarelto/Lovenox for kyphoplasty. Will need to see if IR can do kyphoplasty on Plavix. Ortho getting her fitted for brace. Breathing well. No SOB. 11/19:  Little pain unless she moves about. Kyphoplasty planned for today. 11/20/18: Kyphoplasty now planned for Monday after holding Plavix. She would like to go home and return on Monday as an outpatient for the procedure. Was able to sit in chair yesterday. 11/22:  Pt was seen by PT yesterday. Rec that she needs SNF or rehab. She wants to stay here until MOnday when she gets kyphoplasty at [facility]. The logistics of this have not been coordinated yet. Will wait until tomorrow when IR is open to further figure out her transfer. Tolerating PO, +BM. 11/23: Dr Shaina Muniz to do kyphoplasty today at [address] notes she has limited IV access for lab draws.   Hopefully will be able to move toward discharge thanks to today's procedure. Will reevaluate her need for IV after procedure. I don't want to get a central line if she will be discharged tomorrow. Pt eager to get the procedure done. Her preference would be to go home with [facility] PT. Tolerated PO yesterday, NPO this AM. 
 
11/24: kyphoplasty was cancelled yesterday due to staffing issues. Rescheduled for Monday at 2pm, here at [facility] by Dr Zuleyka Briones. PT unwilling to work with PT yesterday due to pain. Ongoing nausea and hypotensive yesterday after IV compazine. LImited IV access, although she doesn't really need it. Will try oral zofran now. 11/25: No AM labs for 2d as pt is difficult stick and has poor IV access. THis is ok with me as she is awaiting placement after her kyphoplasty. BPs on the low side again last night. Will leave parameters for her BP meds. PT still refusing to get up with PT because \"I was told not to move and to be on complete bed rest by the IR doctor. \"  Does note she has a HA today. 11/26:  No complaints other than back pain. Kyphoplasty again planned for later today. K+ 2.6 and Mag 1.3 this AM - replacing. Stable to have kyphoplasty today. 11/27:  No complaints other than back pain. Kyphoplasty again postponed yesterday - attempting to get it done again today. AM labs pending. BP was low yesterday but responded to fluids - never was in any distress - was likely a little dehydrated and from lying constantly in bed. Very difficult to get IV sites/ labs from her - AM labs pending - need to at least get lab for K+ and Mg+ - other RNs will try this AM.  
 
11/28: Had kypho yesterday. Feels much better with less pain. BP on the low side so will decrease Coreg. Hopefully can work with PT. She lives alone so would benefit from rehab. She has been at [address] [place] [address] in the past. Restart Plavix. 11/29:  4-5 loose stools yesterday but none overnight nurses report.   Hb at 9.6. Checking SFOB. Mag still low - replacing but will change to IV with her diarrhea. Creat better. : Only had 1 loose stool. Was able to get to the BR with PT yesterday. AM labs pending. Should be ready for discharge to [address] [place] [address]. : She wants to go home as SNF not affordable. She has family help at home but is not sure for how long. She could go with [facility] but I am not sure her family will be able to care for her. Discussed need for personal care and she and her daughter will look at agencies to see if they can afford. She needs to be up in the chair for meals. Discussed the longer she is in bed the more weakness she will have.   
Review of Systems: A comprehensive review of systems was negative except for that written in the HPI. Objective:  
Physical Exam:  
 
Visit Vitals BP 99/62 (BP 1 Location: Right arm, BP Patient Position: At rest) Pulse (!) 102 Temp 98.3 °F (36.8 °C) Resp 16 Ht 5' 7\" (1.702 m) Wt 219 lb 3.2 oz (99.4 kg) SpO2 97% Breastfeeding? No  
BMI 35.38 kg/m² O2 Flow Rate (L/min): 2 l/min O2 Device: Room air Temp (24hrs), Av.4 °F (36.9 °C), Min:98.1 °F (36.7 °C), Max:98.6 °F (37 °C) No intake/output data recorded.  1901 -  0700 In: -  
Out: 400 [Urine:400] General:  cooperative, obese, no distress, appears stated age. Head:  Normocephalic, without obvious abnormality, atraumatic. Eyes:  Conjunctivae/corneas clear. PERRL, EOMs intact. Nose: NC O2 in place. Throat: Lips, mucosa, and tongue moist.  
Neck: Supple, symmetrical, trachea midline, no adenopathy, thyroid: no enlargement/tenderness/nodules, no carotid bruit and no JVD. Back:   Symmetric, tenderness to palpation over T6-7-8 area. Lungs:   Diminished BS bilaterally but otherwise clear at this time. Chest wall:  No tenderness or deformity. Heart:  Irregular rate and rhythm, no murmur, click, rub or gallop. Abdomen:   Soft, non-tender. Bowel sounds normal. No masses,  No organomegaly. Extremities: no cyanosis. Trace LE edema. No calf tenderness or cords. Skin:  turgor normal.  bruising on arms sl worse Neurologic: CNII-XII intact. Alert and oriented X 3. Fine motor of hands and fingers normal.   equal.  Gait not tested at this time. Sensation grossly normal to touch. Gross motor of extremities normal.    
 
Data Review:  
  CT of Chest 11/13/18 FINDINGS: 
THYROID: No nodule. MEDIASTINUM: No mass or lymphadenopathy. SONJA: No mass or lymphadenopathy. THORACIC AORTA: No aneurysm. Mild vascular calcifications MAIN PULMONARY ARTERY: Normal in caliber. TRACHEA/BRONCHI: Patent. ESOPHAGUS: No wall thickening or dilatation. Small hiatal hernia HEART: Normal in size. PLEURA: No effusion or pneumothorax. LUNGS: No nodule, mass, or airspace disease. Bilateral atelectatic changes. INCIDENTALLY IMAGED UPPER ABDOMEN: No focal abnormality. BONES: Bones are osteopenic. There is collapse of the superior endplate of T7. This has occurred since June 2018 Possible milk of calcium cyst within the right breast 
IMPRESSION: 
1. No acute cardiopulmonary disease 2. Interval collapse superior endplate of T7. The patient has back pain this may 
be acute to subacute and MR imaging would better characterize 
  
Recent Days: 
Recent Labs 12/01/18 
9717 11/30/18 
0530 11/29/18 
0248 WBC 5.3 5.9 5.0 HGB 9.8* 10.0* 9.6* HCT 31.3* 31.2* 31.5*  161 146* Recent Labs 12/01/18 
8173 11/30/18 
0530 11/29/18 
0248  139 146*  
K 3.2* 3.3* 3.9 * 107 112* CO2 23 22 22 * 107* 86 BUN 9 11 17 CREA 1.11* 0.98 1.10* CA 7.7* 7.8* 7.5* MG 1.2* 1.6 1.2* No results for input(s): PH, PCO2, PO2, HCO3, FIO2 in the last 72 hours. 24 Hour Results: 
Recent Results (from the past 24 hour(s)) GLUCOSE, POC Collection Time: 11/30/18 11:13 AM  
Result Value Ref Range Glucose (POC) 107 (H) 65 - 100 mg/dL Performed by Abigail Webber (PCT) GLUCOSE, POC Collection Time: 11/30/18  4:39 PM  
Result Value Ref Range Glucose (POC) 112 (H) 65 - 100 mg/dL Performed by Abigail Webber (PCT) GLUCOSE, POC Collection Time: 11/30/18  9:51 PM  
Result Value Ref Range Glucose (POC) 129 (H) 65 - 100 mg/dL Performed by Gerardo Russ ([facility]) MAGNESIUM Collection Time: 12/01/18  5:17 AM  
Result Value Ref Range Magnesium 1.2 (L) 1.6 - 2.4 mg/dL CBC WITH AUTOMATED DIFF Collection Time: 12/01/18  5:17 AM  
Result Value Ref Range WBC 5.3 3.6 - 11.0 K/uL  
 RBC 3.42 (L) 3.80 - 5.20 M/uL HGB 9.8 (L) 11.5 - 16.0 g/dL HCT 31.3 (L) 35.0 - 47.0 % MCV 91.5 80.0 - 99.0 FL  
 MCH 28.7 26.0 - 34.0 PG  
 MCHC 31.3 30.0 - 36.5 g/dL  
 RDW 15.4 (H) 11.5 - 14.5 % PLATELET 793 575 - 347 K/uL MPV 9.8 8.9 - 12.9 FL  
 NRBC 0.0 0  WBC ABSOLUTE NRBC 0.00 0.00 - 0.01 K/uL NEUTROPHILS 67 32 - 75 % BAND NEUTROPHILS 3 0 - 6 % LYMPHOCYTES 16 12 - 49 % MONOCYTES 6 5 - 13 % EOSINOPHILS 2 0 - 7 % BASOPHILS 1 0 - 1 % METAMYELOCYTES 5 (H) 0 % IMMATURE GRANULOCYTES 0 %  
 ABS. NEUTROPHILS 3.7 1.8 - 8.0 K/UL  
 ABS. LYMPHOCYTES 0.8 0.8 - 3.5 K/UL  
 ABS. MONOCYTES 0.3 0.0 - 1.0 K/UL  
 ABS. EOSINOPHILS 0.1 0.0 - 0.4 K/UL  
 ABS. BASOPHILS 0.1 0.0 - 0.1 K/UL  
 ABS. IMM. GRANS. 0.0 K/UL  
 DF MANUAL    
 RBC COMMENTS NORMOCYTIC, NORMOCHROMIC METABOLIC PANEL, BASIC Collection Time: 12/01/18  5:17 AM  
Result Value Ref Range Sodium 144 136 - 145 mmol/L Potassium 3.2 (L) 3.5 - 5.1 mmol/L Chloride 109 (H) 97 - 108 mmol/L  
 CO2 23 21 - 32 mmol/L Anion gap 12 5 - 15 mmol/L Glucose 110 (H) 65 - 100 mg/dL BUN 9 6 - 20 MG/DL Creatinine 1.11 (H) 0.55 - 1.02 MG/DL  
 BUN/Creatinine ratio 8 (L) 12 - 20 GFR est AA 59 (L) >60 ml/min/1.73m2 GFR est non-AA 49 (L) >60 ml/min/1.73m2  Calcium 7.7 (L) 8.5 - 10.1 MG/DL  
 
 
 Medications reviewed Current Facility-Administered Medications Medication Dose Route Frequency  potassium chloride SR (KLOR-CON 10) tablet 20 mEq  20 mEq Oral BID  
 guaiFENesin-dextromethorphan (ROBITUSSIN DM) 100-10 mg/5 mL syrup 10 mL  2 tsp Oral Q6H PRN  
 lisinopril (PRINIVIL, ZESTRIL) tablet 2.5 mg  2.5 mg Oral DAILY  carvedilol (COREG) tablet 12.5 mg  12.5 mg Oral BID WITH MEALS  clopidogrel (PLAVIX) tablet 75 mg  75 mg Oral DAILY  rivaroxaban (XARELTO) tablet 20 mg  20 mg Oral DAILY WITH DINNER  
 ondansetron (ZOFRAN ODT) tablet 4 mg  4 mg Oral Q8H PRN  
 calcitonin (salmon) (MIACALCIN) nasal 1 Spray  1 [place] IntraNASal DAILY  arformoterol (BROVANA) neb solution 15 mcg  15 mcg Nebulization BID RT  
 budesonide (PULMICORT) 500 mcg/2 ml nebulizer suspension  500 mcg Nebulization BID RT  
 albuterol-ipratropium (DUO-NEB) 2.5 MG-0.5 MG/3 ML  3 mL Nebulization Q6H RT  
 DULoxetine (CYMBALTA) capsule 60 mg  60 mg Oral DAILY  isosorbide mononitrate ER (IMDUR) tablet 30 mg  30 mg Oral DAILY  montelukast (SINGULAIR) tablet 10 mg  10 mg Oral DAILY  sAXagliptin (ONGLYZA) tablet 5 mg  5 mg Oral DAILY  sodium chloride (NS) flush 5-10 mL  5-10 mL IntraVENous Q8H  
 sodium chloride (NS) flush 5-10 mL  5-10 mL IntraVENous PRN  
 acetaminophen (TYLENOL) tablet 650 mg  650 mg Oral Q4H PRN  
 oxyCODONE-acetaminophen (PERCOCET) 5-325 mg per tablet 1 Tab  1 Tab Oral Q4H PRN  
 HYDROmorphone (PF) (DILAUDID) injection 0.5 mg  0.5 mg IntraVENous Q4H PRN  
 zolpidem (AMBIEN) tablet 5 mg  5 mg Oral QHS PRN  
 insulin lispro (HUMALOG) injection   SubCUTAneous QID WITH MEALS  glucose chewable tablet 16 g  4 Tab Oral PRN  
 dextrose (D50W) injection syrg 12.5-25 g  25-50 mL IntraVENous PRN  
 glucagon (GLUCAGEN) injection 1 mg  1 mg IntraMUSCular PRN  pantoprazole (PROTONIX) tablet 40 mg  40 mg Oral ACB&D Care Plan discussed with: Patient Total time spent with patient: 30 minutes.  
 
Rosalba Martinez MD

## 2018-12-01 NOTE — PROGRESS NOTES
Bedside shift change report given to Amanda Perry Rn (oncoming nurse) by Karli Castelan RN (offgoing nurse). Report included the following information SBAR, Intake/Output, MAR and Recent Results.

## 2018-12-01 NOTE — PROGRESS NOTES
RN updated physician on call for Leon about low blood pressure. MD aware, no new orders given. RN will continue to monitor patient. Patient alert with no symptoms noted.

## 2018-12-01 NOTE — PROGRESS NOTES
Patient loly lifted to chair with mobility x2, PCT, and this RN. Patient yelling and cursing at staff. Patient able to sit in chair for 45 minutes and then attempted to get out of the chair her self, yelling and cursing at staff. Patient returned to bed as requested.

## 2018-12-01 NOTE — PROGRESS NOTES
Bedside and Verbal shift change report given to Ciro Norwood RN (oncoming nurse) by Aurea Cisneros RN (offgoing nurse). Report included the following information SBAR, Kardex, Intake/Output, MAR and Recent Results.

## 2018-12-01 NOTE — PROGRESS NOTES
Bedside shift change report given to Marguerite Germain RN (oncoming nurse) by Kika Zuniga RN (offgoing nurse). Report included the following information SBAR, Procedure Summary, Intake/Output and Recent Results.

## 2018-12-02 LAB
ANION GAP SERPL CALC-SCNC: 11 MMOL/L (ref 5–15)
BASOPHILS # BLD: 0.1 K/UL (ref 0–0.1)
BASOPHILS NFR BLD: 1 % (ref 0–1)
BUN SERPL-MCNC: 7 MG/DL (ref 6–20)
BUN/CREAT SERPL: 7 (ref 12–20)
CALCIUM SERPL-MCNC: 8.3 MG/DL (ref 8.5–10.1)
CHLORIDE SERPL-SCNC: 109 MMOL/L (ref 97–108)
CO2 SERPL-SCNC: 23 MMOL/L (ref 21–32)
CREAT SERPL-MCNC: 0.95 MG/DL (ref 0.55–1.02)
DIFFERENTIAL METHOD BLD: ABNORMAL
EOSINOPHIL # BLD: 0.1 K/UL (ref 0–0.4)
EOSINOPHIL NFR BLD: 1 % (ref 0–7)
ERYTHROCYTE [DISTWIDTH] IN BLOOD BY AUTOMATED COUNT: 15.6 % (ref 11.5–14.5)
GLUCOSE BLD STRIP.AUTO-MCNC: 134 MG/DL (ref 65–100)
GLUCOSE BLD STRIP.AUTO-MCNC: 138 MG/DL (ref 65–100)
GLUCOSE BLD STRIP.AUTO-MCNC: 150 MG/DL (ref 65–100)
GLUCOSE BLD STRIP.AUTO-MCNC: 159 MG/DL (ref 65–100)
GLUCOSE SERPL-MCNC: 119 MG/DL (ref 65–100)
HCT VFR BLD AUTO: 34.8 % (ref 35–47)
HGB BLD-MCNC: 10.8 G/DL (ref 11.5–16)
IMM GRANULOCYTES # BLD: 0 K/UL
IMM GRANULOCYTES NFR BLD AUTO: 0 %
LYMPHOCYTES # BLD: 1.3 K/UL (ref 0.8–3.5)
LYMPHOCYTES NFR BLD: 22 % (ref 12–49)
MAGNESIUM SERPL-MCNC: 1.2 MG/DL (ref 1.6–2.4)
MCH RBC QN AUTO: 28.9 PG (ref 26–34)
MCHC RBC AUTO-ENTMCNC: 31 G/DL (ref 30–36.5)
MCV RBC AUTO: 93 FL (ref 80–99)
METAMYELOCYTES NFR BLD MANUAL: 2 %
MONOCYTES # BLD: 0.4 K/UL (ref 0–1)
MONOCYTES NFR BLD: 7 % (ref 5–13)
NEUTS BAND NFR BLD MANUAL: 5 % (ref 0–6)
NEUTS SEG # BLD: 4 K/UL (ref 1.8–8)
NEUTS SEG NFR BLD: 62 % (ref 32–75)
NRBC # BLD: 0 K/UL (ref 0–0.01)
NRBC BLD-RTO: 0 PER 100 WBC
PLATELET # BLD AUTO: 207 K/UL (ref 150–400)
PMV BLD AUTO: 9.6 FL (ref 8.9–12.9)
POTASSIUM SERPL-SCNC: 3.9 MMOL/L (ref 3.5–5.1)
RBC # BLD AUTO: 3.74 M/UL (ref 3.8–5.2)
RBC MORPH BLD: ABNORMAL
SERVICE CMNT-IMP: ABNORMAL
SODIUM SERPL-SCNC: 143 MMOL/L (ref 136–145)
WBC # BLD AUTO: 6 K/UL (ref 3.6–11)

## 2018-12-02 PROCEDURE — 74011250637 HC RX REV CODE- 250/637: Performed by: FAMILY MEDICINE

## 2018-12-02 PROCEDURE — 82962 GLUCOSE BLOOD TEST: CPT

## 2018-12-02 PROCEDURE — 77030038269 HC DRN EXT URIN PURWCK BARD -A

## 2018-12-02 PROCEDURE — 94640 AIRWAY INHALATION TREATMENT: CPT

## 2018-12-02 PROCEDURE — 85025 COMPLETE CBC W/AUTO DIFF WBC: CPT

## 2018-12-02 PROCEDURE — 94760 N-INVAS EAR/PLS OXIMETRY 1: CPT

## 2018-12-02 PROCEDURE — 77010033678 HC OXYGEN DAILY

## 2018-12-02 PROCEDURE — 65270000029 HC RM PRIVATE

## 2018-12-02 PROCEDURE — 74011636637 HC RX REV CODE- 636/637: Performed by: FAMILY MEDICINE

## 2018-12-02 PROCEDURE — 36415 COLL VENOUS BLD VENIPUNCTURE: CPT

## 2018-12-02 PROCEDURE — 83735 ASSAY OF MAGNESIUM: CPT

## 2018-12-02 PROCEDURE — 97535 SELF CARE MNGMENT TRAINING: CPT | Performed by: OCCUPATIONAL THERAPIST

## 2018-12-02 PROCEDURE — 74011250637 HC RX REV CODE- 250/637: Performed by: INTERNAL MEDICINE

## 2018-12-02 PROCEDURE — 80048 BASIC METABOLIC PNL TOTAL CA: CPT

## 2018-12-02 PROCEDURE — 97165 OT EVAL LOW COMPLEX 30 MIN: CPT | Performed by: OCCUPATIONAL THERAPIST

## 2018-12-02 PROCEDURE — 74011000250 HC RX REV CODE- 250: Performed by: INTERNAL MEDICINE

## 2018-12-02 RX ADMIN — PANTOPRAZOLE SODIUM 40 MG: 40 TABLET, DELAYED RELEASE ORAL at 17:05

## 2018-12-02 RX ADMIN — MONTELUKAST SODIUM 10 MG: 10 TABLET, COATED ORAL at 08:31

## 2018-12-02 RX ADMIN — POTASSIUM CHLORIDE 20 MEQ: 750 TABLET, EXTENDED RELEASE ORAL at 17:53

## 2018-12-02 RX ADMIN — IPRATROPIUM BROMIDE AND ALBUTEROL SULFATE 3 ML: .5; 3 SOLUTION RESPIRATORY (INHALATION) at 07:31

## 2018-12-02 RX ADMIN — SAXAGLIPTIN 5 MG: 5 TABLET, FILM COATED ORAL at 09:15

## 2018-12-02 RX ADMIN — OXYCODONE AND ACETAMINOPHEN 1 TABLET: 5; 325 TABLET ORAL at 05:02

## 2018-12-02 RX ADMIN — CLOPIDOGREL BISULFATE 75 MG: 75 TABLET ORAL at 08:31

## 2018-12-02 RX ADMIN — BUDESONIDE 500 MCG: 0.5 INHALANT RESPIRATORY (INHALATION) at 20:52

## 2018-12-02 RX ADMIN — IPRATROPIUM BROMIDE AND ALBUTEROL SULFATE 3 ML: .5; 3 SOLUTION RESPIRATORY (INHALATION) at 13:14

## 2018-12-02 RX ADMIN — CARVEDILOL 12.5 MG: 12.5 TABLET, FILM COATED ORAL at 17:05

## 2018-12-02 RX ADMIN — ONDANSETRON 4 MG: 4 TABLET, ORALLY DISINTEGRATING ORAL at 16:40

## 2018-12-02 RX ADMIN — ONDANSETRON 4 MG: 4 TABLET, ORALLY DISINTEGRATING ORAL at 09:15

## 2018-12-02 RX ADMIN — ONDANSETRON 4 MG: 4 TABLET, ORALLY DISINTEGRATING ORAL at 05:02

## 2018-12-02 RX ADMIN — Medication 10 ML: at 22:33

## 2018-12-02 RX ADMIN — IPRATROPIUM BROMIDE AND ALBUTEROL SULFATE 3 ML: .5; 3 SOLUTION RESPIRATORY (INHALATION) at 20:52

## 2018-12-02 RX ADMIN — POTASSIUM CHLORIDE 20 MEQ: 750 TABLET, EXTENDED RELEASE ORAL at 08:31

## 2018-12-02 RX ADMIN — PANTOPRAZOLE SODIUM 40 MG: 40 TABLET, DELAYED RELEASE ORAL at 06:30

## 2018-12-02 RX ADMIN — INSULIN LISPRO 3 UNITS: 100 INJECTION, SOLUTION INTRAVENOUS; SUBCUTANEOUS at 08:30

## 2018-12-02 RX ADMIN — ISOSORBIDE MONONITRATE 30 MG: 30 TABLET, EXTENDED RELEASE ORAL at 08:31

## 2018-12-02 RX ADMIN — LISINOPRIL 2.5 MG: 5 TABLET ORAL at 08:31

## 2018-12-02 RX ADMIN — Medication 10 ML: at 14:00

## 2018-12-02 RX ADMIN — OXYCODONE AND ACETAMINOPHEN 1 TABLET: 5; 325 TABLET ORAL at 09:15

## 2018-12-02 RX ADMIN — CARVEDILOL 12.5 MG: 12.5 TABLET, FILM COATED ORAL at 08:31

## 2018-12-02 RX ADMIN — BUDESONIDE 500 MCG: 0.5 INHALANT RESPIRATORY (INHALATION) at 07:31

## 2018-12-02 RX ADMIN — RIVAROXABAN 20 MG: 20 TABLET, FILM COATED ORAL at 17:05

## 2018-12-02 RX ADMIN — Medication 10 ML: at 06:29

## 2018-12-02 RX ADMIN — DULOXETINE 60 MG: 30 CAPSULE, DELAYED RELEASE ORAL at 08:31

## 2018-12-02 RX ADMIN — OXYCODONE AND ACETAMINOPHEN 1 TABLET: 5; 325 TABLET ORAL at 16:40

## 2018-12-02 NOTE — PROGRESS NOTES
Problem: Self Care Deficits Care Plan (Adult) Goal: *Acute Goals and Plan of Care (Insert Text) Occupational Therapy Goals Initiated 12/2/2018 1. Patient will perform lower body dressing with maximal assistance using adaptive equipment within 7 day(s). 2.  Patient will perform toilet transfers to drop arm [facility] with maximal assistance within 7 day(s). 3.  Patient will perform all aspects of toileting with maximal assistance within 7 day(s). 4.  Patient will participate in upper extremity therapeutic exercise/activities with independence for 10 minutes within 7 day(s). 5.  Patient will utilize energy conservation techniques during functional activities with verbal and visual cues within 7 day(s). Occupational Therapy EVALUATION Patient: Gisela Tyson (5 y.o. female) Date: 12/2/2018 Primary Diagnosis: Dyspnea Dyspnea Procedure(s) (LRB): 
PACU/RECOVERY (N/A) 5 Days Post-Op Precautions:  Fall, Skin ASSESSMENT : 
Based on the objective data described below, the patient presents with drowsiness, EDWARDS, decreased strength, endurance, mobility balance and safety. Pt admitted 11/15/18 with dyspnea. T7 compression fx noted during admission and kyphoplasty performed 11/27/2018. O2 sats at rest and with activity on 3L O2 98%, but RR 28 and HR in the 110s. Pt currently requires total A for LE ADLs, toileting and functional mobility. She lives alone in [place] at [facility]. Recommend rehab at discharge, but pt declining due to financial reasons. Recommend [facility] therapy with 24/7 assist at discharge. At this point pt will require a mechanical lift for safe mobility outside of therapy. Patient will benefit from skilled intervention to address the above impairments. Patients rehabilitation potential is considered to be Guarded Factors which may influence rehabilitation potential include:  
[]             None noted []             Mental ability/status [x]             Medical condition []             Home/family situation and support systems []             Safety awareness []             Pain tolerance/management 
[]             Other: PLAN : 
Recommendations and Planned Interventions: 
[x]               Self Care Training                  [x]        Therapeutic Activities [x]               Functional Mobility Training    []        Cognitive Retraining 
[x]               Therapeutic Exercises           [x]        Endurance Activities [x]               Balance Training                   [x]        Neuromuscular Re-Education []               Visual/Perceptual Training     [x]   Home Safety Training 
[x]               Patient Education                 [x]        Family Training/Education []               Other (comment): Frequency/Duration: Patient will be followed by occupational therapy 5 times a week to address goals. Discharge Recommendations: Rehab and Home Health Further Equipment Recommendations for Discharge: heavy duty drop arm bedside commode, hospital bed and mechanical lift SUBJECTIVE:  
Patient stated I have been in the chair for 4 hours. I am tired.  OBJECTIVE DATA SUMMARY:  
HISTORY:  
Past Medical History:  
Diagnosis Date  Asthma  Atrial fibrillation ([facility] 11/16/2018  Autoimmune disease ([facility]   
 fibromyalgia  CAD (coronary artery disease) 10/9/2015  Closed wedge compression fracture of T7 vertebra ([facility] 11/13/2018  Diabetes ([facility]  DVT (deep vein thrombosis) in pregnancy ([facility]  GERD (gastroesophageal reflux disease)  HTN (hypertension)  NSTEMI (non-ST elevated myocardial infarction) ([facility] 10/9/2015  Obesity (BMI 30-39.9) 11/13/2018  Sleep apnea   
 wears CPAP Past Surgical History:  
Procedure Laterality Date  ABDOMEN SURGERY PROC UNLISTED    
 hital hernia repair, gall bladder removed  BREAST SURGERY PROCEDURE UNLISTED    
 lumpectomy  CARDIAC SURG PROCEDURE UNLIST  10/9/15  
 2 stents [address]  HX COLOSTOMY    
 and reversal, post episiotomy repair [address]  HX UROLOGICAL  2009  
 bladder sling Prior Level of Function/Environment/Context: Per pt she was mod I with ADLs and functional mobility, cares for her dog, drives Home Situation Home Environment: Independent living([facility]) # Steps to Enter: 0 One/Two Story Residence: One story Living Alone: Yes Support Systems: Family member(s) Patient [facility] Current DME Used/Available at Home: Lift chair, Walker, rolling, Walker, rollator, Wheelchair, Jearl Barber, quad, Shower chair, Grab bars, Commode, bedside, [employer] dressing aides(reacher, sock aide, long shoe horn, long sponge) Tub or Shower Type: Shower Hand dominance: RightEXAMINATION OF PERFORMANCE DEFICITS: 
Cognitive/Behavioral Status: 
Neurologic State: Drowsy; Eyes open to voice Orientation Level: Oriented X4 Cognition: Appropriate for age attention/concentration; Follows commands Perception: Appears intact Perseveration: No perseveration noted Safety/Judgement: Awareness of environment; Fall prevention; Insight into deficits; Decreased awareness of need for safety Hearing: Auditory Auditory Impairment: Hard of hearing, bilateral, Hearing aid(s) Hearing Aids/Status: At bedside, With patient Vision/Perceptual:   
Acuity: Impaired near vision Corrective Lenses: Reading glasses Range of Motion: 
AROM: Generally decreased, functional 
PROM: Generally decreased, functional 
  
  
  
  
  
  
Strength: 
Strength: Generally decreased, functional 
  
  
  
  
Coordination: 
Coordination: Within functional limits Fine Motor Skills-Upper: Left Intact; Right Intact Gross Motor Skills-Upper: Left Intact; Right Intact Balance: 
Sitting: Intact; With support Standing: Impaired;Pull to stand; With support Standing - Static: Poor Standing - Dynamic : Poor Functional Mobility and Transfers for ADLs:Bed Mobility: 
Rolling: Minimum assistance; Additional time;Assist x1 Sit to Supine: Moderate assistance;Assist x1;Additional time(A for LEs) Scooting: Total assistance Transfers: 
Sit to Stand: Total assistance(max A x2 fro chair with arms) Stand to Sit: Total assistance Toilet Transfer : Total assistance(max A x2 to [facility]- recommend use of sit to stand lift) ADL Assessment and Intervention: 
Feeding: Modified independent(pt declined breakfast and lunch) Oral Facial Hygiene/Grooming: Setup; Additional time(seated) Bathing: Moderate assistance; Additional time;Assist x1(A for cleanliness, to reach buttocks and feet) Upper Body Dressing: Setup; Additional time(seated in chair) Lower Body Dressing: Total assistance Toileting: Total assistance(pt incontinent of bowel) Educated pt on back safety and back precautions using BLTS. She verbalized fair understanding and will require further education for carryover. Cognitive Retraining Safety/Judgement: Awareness of environment; Fall prevention; Insight into deficits; Decreased awareness of need for safety Functional Measure: 
Barthel Index: 
 
Bathin Bladder: 10 Bowels: 5 Groomin Dressin Feeding: 10 Mobility: 0 Stairs: 0 Toilet Use: 0 Transfer (Bed to Chair and Back): 5 Total: 35 Barthel and G-code impairment scale: 
Percentage of impairment CH 
0% CI 
1-19% CJ 
20-39% CK 
40-59% CL 
60-79% CM 
80-99% CN 
100% Barthel Score 0-100 100 99-80 79-60 59-40 20-39 1-19 
 0 Barthel Score 0-20 20 17-19 13-16 9-12 5-8 1-4 0 The Barthel ADL Index: Guidelines 1. The index should be used as a record of what a patient does, not as a record of what a patient could do. 2. The main aim is to establish degree of independence from any help, physical or verbal, however minor and for whatever reason. 3. The need for supervision renders the patient not independent. 4. A patient's performance should be established using the best available evidence. Asking the patient, friends/relatives and nurses are the usual sources, but direct observation and common sense are also important. However direct testing is not needed. 5. Usually the patient's performance over the preceding 24-48 hours is important, but occasionally longer periods will be relevant. 6. Middle categories imply that the patient supplies over 50 per cent of the effort. 7. Use of aids to be independent is allowed. Kaleb Rouse., Barthel, D.W. ([id number]). Functional evaluation: the Barthel Index. [address] [facility] (14)2. BEVERLEY WilcoxF, Frannie Mcknight., Lyn Vicente., carrie [place], [address] [place] [address] (1999). Measuring the change indisability after inpatient rehabilitation; comparison of the responsiveness of the Barthel Index and Functional [place] Measure. Journal of Neurology, Neurosurgery, and Psychiatry, 66(4), 983-754. Bunny Knight, N.J.A, RENE Parry.MEGHA, & Ike Rodriges MALEYDA. (2004.) Assessment of post-stroke quality of life in cost-effectiveness studies: The usefulness of the Barthel Index and the EuroQoL-5D. [facility], 13, 287-68 G codes: In compliance with CMSs Claims Based Outcome Reporting, the following G-code set was chosen for this patient based on their primary functional limitation being treated: The outcome measure chosen to determine the severity of the functional limitation was the Barthel index with a score of 35/100 which was correlated with the impairment scale. ? Self Care:  
  - CURRENT STATUS: CL - 60%-79% impaired, limited or restricted  - GOAL STATUS: CK - 40%-59% impaired, limited or restricted  - D/C STATUS:  ---------------To be determined--------------- Occupational Therapy Evaluation Charge Determination History Examination Decision-Making LOW Complexity : Brief history review  HIGH Complexity : 5 or more performance deficits relating to physical, cognitive , or psychosocial skils that result in activity limitations and / or participation restrictions MEDIUM Complexity : Patient may present with comorbidities that affect occupational performnce. Miniml to moderate modification of tasks or assistance (eg, physical or verbal ) with assesment(s) is necessary to enable patient to complete evaluation Based on the above components, the patient evaluation is determined to be of the following complexity level: LOW Pain: 
Pain Scale 1: Numeric (0 - 10) Pain Intensity 1: 6 Pain Location 1: Leg;Back Pain Orientation 1: Posterior Pain Description 1: Aching Pain Intervention(s) 1: Medication (see MAR) Activity Tolerance:  
Fair Please refer to the flowsheet for vital signs taken during this treatment. After treatment:  
[] Patient left in no apparent distress sitting up in chair 
[x] Patient left in no apparent distress in bed 
[x] Call bell left within reach [x] Nursing notified 
[] Caregiver present [x] Bed alarm activated COMMUNICATION/EDUCATION:  
The patients plan of care was discussed with: Registered Nurse. [x] Home safety education was provided and the patient/caregiver indicated understanding. [x] Patient/family have participated as able in goal setting and plan of care. [x] Patient/family agree to work toward stated goals and plan of care. [] Patient understands intent and goals of therapy, but is neutral about his/her participation. [] Patient is unable to participate in goal setting and plan of care. This patients plan of care is appropriate for delegation to [facility]. Thank you for this referral. 
Alessio López, OT Time Calculation: 31 mins

## 2018-12-02 NOTE — PROGRESS NOTES
Daily Progress Note: 12/2/2018 [facility] Nathan Anthony MD 
 
Assessment/Plan: Dyspnea (11/13/2018) improved - workup with Pul and Card. - has completed steroid course 
  
  Type II diabetes mellitus ([facility] (10/9/2015) - with reported nephropathy with proteinuria without CKD 
            - continue saxagliptin and follow on SSI 
  
  Coronary artery disease involving native coronary artery without angina pectoris (1/22/2016) - stable, continue cardiac meds 
             
   Essential hypertension (1/22/2016)- on low side at times 
            - decrease Coreg to 12.5mg BID 
            - decrease Lisinopril to 2.5 mg 
  
  Chronic anticoagulation (3/30/2018) 
            -therapeutic lovenox,  
            - back on Xarelto 
  
  Obesity, BMI 30-39. 9([facility]) (10/1/2018) - counseled on weight loss, this is contributing to her current symptoms 
  
  ARF (acute renal failure) ([facility] (11/13/2018) - creatinine up acutely from prior, possibly due to diuresis 
            - holding diuretic  
  
  Closed wedge compression fracture of T7 vertebra ([facility] (11/13/2018) - kypho 11/27 Anemia - Hb stable Diarrhea- improved 
           - afebrile- if fever or continuing will check c-diff Hypokalemia 
           - monitor and replete General Debilitation 
           - PT/OT- up in chair for meals twice a day Code status: 
            - patient is FULL CODE, no AMD on file, her daughter Tram Will is her surrogate Dispo:not able to afford SNF- she and daughter will look for agencies to assist with personal care- this will be out of pocket. She wants to go home with HH/PT/OT 
   
 
Problem List: 
Problem List as of 12/2/2018 Date Reviewed: 11/27/2018 Codes Class Noted - Resolved * (Principal) Dyspnea ICD-10-CM: R06.00 
ICD-9-CM: 786.09  11/13/2018 - Present ARF (acute renal failure) (HCC) ICD-10-CM: N17.9 ICD-9-CM: 584.9  11/13/2018 - Present Obesity (BMI 30-39.9) (Chronic) ICD-10-CM: Q54.7 ICD-9-CM: 278.00  11/13/2018 - Present Closed wedge compression fracture of T7 vertebra ([facility] ICD-10-CM: T13.416X ICD-9-CM: 805.2  11/13/2018 - Present Type 2 diabetes with nephropathy ([facility] ICD-10-CM: E11.21 
ICD-9-CM: 250.40, 583.81  10/1/2018 - Present Chest pain ICD-10-CM: R07.9 ICD-9-CM: 786.50  6/27/2018 - Present Generalized abdominal pain ICD-10-CM: R10.84 ICD-9-CM: 789.07  6/27/2018 - Present Recurrent deep vein thrombosis (DVT) (HCC) ICD-10-CM: I82.409 ICD-9-CM: 453.40  3/30/2018 - Present Chronic anticoagulation (Chronic) ICD-10-CM: Z79.01 
ICD-9-CM: V58.61  3/30/2018 - Present Coronary artery disease involving native coronary artery without angina pectoris (Chronic) ICD-10-CM: I25.10 ICD-9-CM: 414.01  1/22/2016 - Present Essential hypertension (Chronic) ICD-10-CM: I10 
ICD-9-CM: 401.9  1/22/2016 - Present Type II diabetes mellitus (HCC) (Chronic) ICD-10-CM: E11.9 ICD-9-CM: 250.00  10/9/2015 - Present NSTEMI (non-ST elevated myocardial infarction) ([facility] ICD-10-CM: I21.4 ICD-9-CM: 410.70  10/9/2015 - Present RESOLVED: CAD (coronary artery disease) ICD-10-CM: I25.10 ICD-9-CM: 414.00  10/9/2015 - 1/22/2016 RESOLVED: HTN (hypertension) ICD-10-CM: I10 
ICD-9-CM: 401.9  10/9/2015 - 1/22/2016 Subjective:  
  81 y.o.  female who is admitted with SOB. Ms. Celestina Jiménez presented to the Emergency Department this PM complaining of SOB: for the past 3 months, worked up by Pulmonary and Cardiology as an outpatient without any definite diagnosis, not hypoxic, associated with back pain, unable to take a deep breath without pain, back pain started at the same time as the SOB. History obtained from chart review and the patient. Previous records reviewed, recent admit for chest pain. (Dr Ashia Enamorado). 11/14:  Feels much better this AM with less back pain on meds. Still very tender over site with any palpation. Less air hunger but still \"not breathing back to normal yet. \"  Glucoses up with the steroids. Pul and Card seeing also. 11/15:    Reports she continues to feel better and reports no longer SOB. She is not moving much due to back pain. No chest pains. Ortho to decide next step for compression fx. No complaints of weakness or numbness LEs. 
 
11/16:  No longer short of breath - will wean steroids to po. IR plans kyphoplasty today. May benefit from Prolia for osteoporosis outpt  
 
11/17/18: Transferred to ICU yesterday for A-fib w/ RVR. Converted after Dilt bolus. Now sinus tach in the 100s. Coreg increased this am. She is not requiring a lot of pain meds but also not moving a lot. Xarelto being held and she is on Lovenox now. 11/18/18: Transferred out of ICU yesterday. Having pain with any movement. Dr Keke Lees would like her to stay on Plavix but is ok holding Xarelto/Lovenox for kyphoplasty. Will need to see if IR can do kyphoplasty on Plavix. Ortho getting her fitted for brace. Breathing well. No SOB. 11/19:  Little pain unless she moves about. Kyphoplasty planned for today. 11/20/18: Kyphoplasty now planned for Monday after holding Plavix. She would like to go home and return on Monday as an outpatient for the procedure. Was able to sit in chair yesterday. 11/22:  Pt was seen by PT yesterday. Rec that she needs SNF or rehab. She wants to stay here until MOnday when she gets kyphoplasty at [facility]. The logistics of this have not been coordinated yet. Will wait until tomorrow when IR is open to further figure out her transfer. Tolerating PO, +BM. 11/23: Dr Barrett Win to do kyphoplasty today at [address] notes she has limited IV access for lab draws.   Hopefully will be able to move toward discharge thanks to today's procedure. Will reevaluate her need for IV after procedure. I don't want to get a central line if she will be discharged tomorrow. Pt eager to get the procedure done. Her preference would be to go home with [facility] PT. Tolerated PO yesterday, NPO this AM. 
 
11/24: kyphoplasty was cancelled yesterday due to staffing issues. Rescheduled for Monday at 2pm, here at [facility] by Dr Eber Barajas. PT unwilling to work with PT yesterday due to pain. Ongoing nausea and hypotensive yesterday after IV compazine. LImited IV access, although she doesn't really need it. Will try oral zofran now. 11/25: No AM labs for 2d as pt is difficult stick and has poor IV access. THis is ok with me as she is awaiting placement after her kyphoplasty. BPs on the low side again last night. Will leave parameters for her BP meds. PT still refusing to get up with PT because \"I was told not to move and to be on complete bed rest by the IR doctor. \"  Does note she has a HA today. 11/26:  No complaints other than back pain. Kyphoplasty again planned for later today. K+ 2.6 and Mag 1.3 this AM - replacing. Stable to have kyphoplasty today. 11/27:  No complaints other than back pain. Kyphoplasty again postponed yesterday - attempting to get it done again today. AM labs pending. BP was low yesterday but responded to fluids - never was in any distress - was likely a little dehydrated and from lying constantly in bed. Very difficult to get IV sites/ labs from her - AM labs pending - need to at least get lab for K+ and Mg+ - other RNs will try this AM.  
 
11/28: Had kypho yesterday. Feels much better with less pain. BP on the low side so will decrease Coreg. Hopefully can work with PT. She lives alone so would benefit from rehab. She has been at [address] [place] [address] in the past. Restart Plavix. 11/29:  4-5 loose stools yesterday but none overnight nurses report.   Hb at 9.6. Checking SFOB. Mag still low - replacing but will change to IV with her diarrhea. Creat better. : Only had 1 loose stool. Was able to get to the BR with PT yesterday. AM labs pending. Should be ready for discharge to [address] [place] [address]. : She wants to go home as SNF not affordable. She has family help at home but is not sure for how long. She could go with [facility] but I am not sure her family will be able to care for her. Discussed need for personal care and she and her daughter will look at agencies to see if they can afford. She needs to be up in the chair for meals. Discussed the longer she is in bed the more weakness she will have.   
 
: Was up in chair yesterday- required a lot of assistance. She c/o pain while up in the chair but stayed up for 45 minutes. Will try again today. Do not see a note yet from OT. She would be better served in SNF as she requires much assistance. Review of Systems: A comprehensive review of systems was negative except for that written in the HPI. Objective:  
Physical Exam:  
 
Visit Vitals /62 (BP 1 Location: Left arm, BP Patient Position: At rest) Pulse (!) 111 Temp 99.2 °F (37.3 °C) Resp 18 Ht 5' 7\" (1.702 m) Wt 219 lb 3.2 oz (99.4 kg) SpO2 97% Breastfeeding? No  
BMI 35.38 kg/m² O2 Flow Rate (L/min): 2 l/min O2 Device: Nasal cannula Temp (24hrs), Av.8 °F (37.1 °C), Min:98.3 °F (36.8 °C), Max:99.8 °F (37.7 °C) No intake/output data recorded.  1901 -  0700 In: -  
Out: 9498 [[id number]] General:  cooperative, obese, no distress, appears stated age. Head:  Normocephalic, without obvious abnormality, atraumatic. Eyes:  Conjunctivae/corneas clear. PERRL, EOMs intact. Nose: NC O2 in place. Throat: Lips, mucosa, and tongue moist.  
Neck: Supple, symmetrical, trachea midline, no adenopathy, thyroid: no enlargement/tenderness/nodules, no carotid bruit and no JVD. Back:   Symmetric, tenderness to palpation over T6-7-8 area. Lungs:   Diminished BS bilaterally but otherwise clear at this time. Chest wall:  No tenderness or deformity. Heart:  Irregular rate and rhythm, no murmur, click, rub or gallop. Abdomen:   Soft, non-tender. Bowel sounds normal. No masses,  No organomegaly. Extremities: no cyanosis. Trace LE edema. No calf tenderness or cords. Skin:  turgor normal.  bruising on arms sl worse Neurologic: CNII-XII intact. Alert and oriented X 3. Fine motor of hands and fingers normal.   equal.  Gait not tested at this time. Sensation grossly normal to touch. Gross motor of extremities normal.    
 
Data Review:  
  CT of Chest 11/13/18 FINDINGS: 
THYROID: No nodule. MEDIASTINUM: No mass or lymphadenopathy. SONJA: No mass or lymphadenopathy. THORACIC AORTA: No aneurysm. Mild vascular calcifications MAIN PULMONARY ARTERY: Normal in caliber. TRACHEA/BRONCHI: Patent. ESOPHAGUS: No wall thickening or dilatation. Small hiatal hernia HEART: Normal in size. PLEURA: No effusion or pneumothorax. LUNGS: No nodule, mass, or airspace disease. Bilateral atelectatic changes. INCIDENTALLY IMAGED UPPER ABDOMEN: No focal abnormality. BONES: Bones are osteopenic. There is collapse of the superior endplate of T7. This has occurred since June 2018 Possible milk of calcium cyst within the right breast 
IMPRESSION: 
1. No acute cardiopulmonary disease 2. Interval collapse superior endplate of T7. The patient has back pain this may 
be acute to subacute and MR imaging would better characterize 
  
Recent Days: 
Recent Labs 12/02/18 
0249 12/01/18 
0517 11/30/18 
0530 WBC 6.0 5.3 5.9 HGB 10.8* 9.8* 10.0* HCT 34.8* 31.3* 31.2*  
 181 161 Recent Labs 12/02/18 
0249 12/01/18 
0517 11/30/18 
0530  144 139  
K 3.9 3.2* 3.3*  
* 109* 107 CO2 23 23 22 * 110* 107* BUN 7 9 11 CREA 0.95 1.11* 0.98  
 CA 8.3* 7.7* 7.8*  
MG 1.2* 1.2* 1.6 No results for input(s): PH, PCO2, PO2, HCO3, FIO2 in the last 72 hours. 24 Hour Results: 
Recent Results (from the past 24 hour(s)) GLUCOSE, POC Collection Time: 12/01/18 12:01 PM  
Result Value Ref Range Glucose (POC) 114 (H) 65 - 100 mg/dL Performed by Courtney Simms (PCT) GLUCOSE, POC Collection Time: 12/01/18  3:42 PM  
Result Value Ref Range Glucose (POC) 121 (H) 65 - 100 mg/dL Performed by Courtney Simms (PCT) GLUCOSE, POC Collection Time: 12/01/18  9:00 PM  
Result Value Ref Range Glucose (POC) 125 (H) 65 - 100 mg/dL Performed by Pennie Kemp (PCT) MAGNESIUM Collection Time: 12/02/18  2:49 AM  
Result Value Ref Range Magnesium 1.2 (L) 1.6 - 2.4 mg/dL CBC WITH AUTOMATED DIFF Collection Time: 12/02/18  2:49 AM  
Result Value Ref Range WBC 6.0 3.6 - 11.0 K/uL  
 RBC 3.74 (L) 3.80 - 5.20 M/uL  
 HGB 10.8 (L) 11.5 - 16.0 g/dL HCT 34.8 (L) 35.0 - 47.0 % MCV 93.0 80.0 - 99.0 FL  
 MCH 28.9 26.0 - 34.0 PG  
 MCHC 31.0 30.0 - 36.5 g/dL  
 RDW 15.6 (H) 11.5 - 14.5 % PLATELET 121 514 - 677 K/uL MPV 9.6 8.9 - 12.9 FL  
 NRBC 0.0 0  WBC ABSOLUTE NRBC 0.00 0.00 - 0.01 K/uL NEUTROPHILS 62 32 - 75 % BAND NEUTROPHILS 5 0 - 6 % LYMPHOCYTES 22 12 - 49 % MONOCYTES 7 5 - 13 % EOSINOPHILS 1 0 - 7 % BASOPHILS 1 0 - 1 % METAMYELOCYTES 2 (H) 0 % IMMATURE GRANULOCYTES 0 %  
 ABS. NEUTROPHILS 4.0 1.8 - 8.0 K/UL  
 ABS. LYMPHOCYTES 1.3 0.8 - 3.5 K/UL  
 ABS. MONOCYTES 0.4 0.0 - 1.0 K/UL  
 ABS. EOSINOPHILS 0.1 0.0 - 0.4 K/UL  
 ABS. BASOPHILS 0.1 0.0 - 0.1 K/UL  
 ABS. IMM. GRANS. 0.0 K/UL  
 DF MANUAL    
 RBC COMMENTS NORMOCYTIC, NORMOCHROMIC METABOLIC PANEL, BASIC Collection Time: 12/02/18  2:49 AM  
Result Value Ref Range Sodium 143 136 - 145 mmol/L Potassium 3.9 3.5 - 5.1 mmol/L Chloride 109 (H) 97 - 108 mmol/L  
 CO2 23 21 - 32 mmol/L  Anion gap 11 5 - 15 mmol/L  
 Glucose 119 (H) 65 - 100 mg/dL BUN 7 6 - 20 MG/DL Creatinine 0.95 0.55 - 1.02 MG/DL  
 BUN/Creatinine ratio 7 (L) 12 - 20 GFR est AA >60 >60 ml/min/1.73m2 GFR est non-AA 58 (L) >60 ml/min/1.73m2 Calcium 8.3 (L) 8.5 - 10.1 MG/DL  
GLUCOSE, POC Collection Time: 12/02/18  6:49 AM  
Result Value Ref Range Glucose (POC) 150 (H) 65 - 100 mg/dL Performed by Eugenio Hoffman (PCT) Medications reviewed Current Facility-Administered Medications Medication Dose Route Frequency  potassium chloride SR (KLOR-CON 10) tablet 20 mEq  20 mEq Oral BID  
 guaiFENesin-dextromethorphan (ROBITUSSIN DM) 100-10 mg/5 mL syrup 10 mL  2 tsp Oral Q6H PRN  
 lisinopril (PRINIVIL, ZESTRIL) tablet 2.5 mg  2.5 mg Oral DAILY  carvedilol (COREG) tablet 12.5 mg  12.5 mg Oral BID WITH MEALS  clopidogrel (PLAVIX) tablet 75 mg  75 mg Oral DAILY  rivaroxaban (XARELTO) tablet 20 mg  20 mg Oral DAILY WITH DINNER  
 ondansetron (ZOFRAN ODT) tablet 4 mg  4 mg Oral Q8H PRN  
 calcitonin (salmon) (MIACALCIN) nasal 1 Spray  1 [place] IntraNASal DAILY  arformoterol (BROVANA) neb solution 15 mcg  15 mcg Nebulization BID RT  
 budesonide (PULMICORT) 500 mcg/2 ml nebulizer suspension  500 mcg Nebulization BID RT  
 albuterol-ipratropium (DUO-NEB) 2.5 MG-0.5 MG/3 ML  3 mL Nebulization Q6H RT  
 DULoxetine (CYMBALTA) capsule 60 mg  60 mg Oral DAILY  isosorbide mononitrate ER (IMDUR) tablet 30 mg  30 mg Oral DAILY  montelukast (SINGULAIR) tablet 10 mg  10 mg Oral DAILY  sAXagliptin (ONGLYZA) tablet 5 mg  5 mg Oral DAILY  sodium chloride (NS) flush 5-10 mL  5-10 mL IntraVENous Q8H  
 sodium chloride (NS) flush 5-10 mL  5-10 mL IntraVENous PRN  
 acetaminophen (TYLENOL) tablet 650 mg  650 mg Oral Q4H PRN  
 oxyCODONE-acetaminophen (PERCOCET) 5-325 mg per tablet 1 Tab  1 Tab Oral Q4H PRN  
 HYDROmorphone (PF) (DILAUDID) injection 0.5 mg  0.5 mg IntraVENous Q4H PRN  
  zolpidem (AMBIEN) tablet 5 mg  5 mg Oral QHS PRN  
 insulin lispro (HUMALOG) injection   SubCUTAneous QID WITH MEALS  glucose chewable tablet 16 g  4 Tab Oral PRN  
 dextrose (D50W) injection syrg 12.5-25 g  25-50 mL IntraVENous PRN  
 glucagon (GLUCAGEN) injection 1 mg  1 mg IntraMUSCular PRN  pantoprazole (PROTONIX) tablet 40 mg  40 mg Oral ACB&D Care Plan discussed with: Patient Total time spent with patient: 30 minutes.  
 
Natalie Price MD

## 2018-12-02 NOTE — ROUTINE PROCESS
Bedside and Verbal shift change report given to Alena Savage RN (oncoming nurse) by Yunier Ceron RN (offgoing nurse). Report included the following information SBAR, Kardex, MAR, Accordion and Recent Results.

## 2018-12-03 VITALS
HEIGHT: 67 IN | RESPIRATION RATE: 16 BRPM | BODY MASS INDEX: 34.4 KG/M2 | DIASTOLIC BLOOD PRESSURE: 53 MMHG | HEART RATE: 99 BPM | OXYGEN SATURATION: 97 % | SYSTOLIC BLOOD PRESSURE: 102 MMHG | WEIGHT: 219.2 LBS | TEMPERATURE: 97.5 F

## 2018-12-03 LAB
GLUCOSE BLD STRIP.AUTO-MCNC: 112 MG/DL (ref 65–100)
GLUCOSE BLD STRIP.AUTO-MCNC: 118 MG/DL (ref 65–100)
SERVICE CMNT-IMP: ABNORMAL
SERVICE CMNT-IMP: ABNORMAL

## 2018-12-03 PROCEDURE — 82962 GLUCOSE BLOOD TEST: CPT

## 2018-12-03 PROCEDURE — 74011250637 HC RX REV CODE- 250/637: Performed by: INTERNAL MEDICINE

## 2018-12-03 PROCEDURE — 97535 SELF CARE MNGMENT TRAINING: CPT

## 2018-12-03 PROCEDURE — 97530 THERAPEUTIC ACTIVITIES: CPT

## 2018-12-03 PROCEDURE — 74011000250 HC RX REV CODE- 250: Performed by: INTERNAL MEDICINE

## 2018-12-03 PROCEDURE — 74011250637 HC RX REV CODE- 250/637: Performed by: FAMILY MEDICINE

## 2018-12-03 PROCEDURE — 77010033678 HC OXYGEN DAILY

## 2018-12-03 PROCEDURE — 94760 N-INVAS EAR/PLS OXIMETRY 1: CPT

## 2018-12-03 PROCEDURE — 77030038269 HC DRN EXT URIN PURWCK BARD -A

## 2018-12-03 PROCEDURE — 94640 AIRWAY INHALATION TREATMENT: CPT

## 2018-12-03 RX ORDER — CARVEDILOL 3.12 MG/1
6.25 TABLET ORAL 2 TIMES DAILY WITH MEALS
Status: DISCONTINUED | OUTPATIENT
Start: 2018-12-03 | End: 2018-12-03 | Stop reason: HOSPADM

## 2018-12-03 RX ORDER — LANOLIN ALCOHOL/MO/W.PET/CERES
400 CREAM (GRAM) TOPICAL DAILY
Status: DISCONTINUED | OUTPATIENT
Start: 2018-12-03 | End: 2018-12-03 | Stop reason: HOSPADM

## 2018-12-03 RX ORDER — POTASSIUM CHLORIDE 750 MG/1
10 TABLET, FILM COATED, EXTENDED RELEASE ORAL 2 TIMES DAILY
Status: DISCONTINUED | OUTPATIENT
Start: 2018-12-03 | End: 2018-12-03 | Stop reason: HOSPADM

## 2018-12-03 RX ORDER — POTASSIUM CHLORIDE 750 MG/1
10 TABLET, FILM COATED, EXTENDED RELEASE ORAL 2 TIMES DAILY
Qty: 60 TAB | Refills: 0 | Status: SHIPPED
Start: 2018-12-03 | End: 2019-01-05 | Stop reason: CLARIF

## 2018-12-03 RX ORDER — CARVEDILOL 6.25 MG/1
6.25 TABLET ORAL 2 TIMES DAILY WITH MEALS
Qty: 60 TAB | Refills: 0 | Status: SHIPPED
Start: 2018-12-03 | End: 2019-01-05 | Stop reason: CLARIF

## 2018-12-03 RX ORDER — LANOLIN ALCOHOL/MO/W.PET/CERES
400 CREAM (GRAM) TOPICAL DAILY
Qty: 30 TAB | Refills: 0 | Status: ON HOLD
Start: 2018-12-04 | End: 2019-07-13 | Stop reason: CLARIF

## 2018-12-03 RX ADMIN — Medication 400 MG: at 08:33

## 2018-12-03 RX ADMIN — Medication 10 ML: at 13:10

## 2018-12-03 RX ADMIN — OXYCODONE AND ACETAMINOPHEN 1 TABLET: 5; 325 TABLET ORAL at 10:27

## 2018-12-03 RX ADMIN — BUDESONIDE 500 MCG: 0.5 INHALANT RESPIRATORY (INHALATION) at 07:57

## 2018-12-03 RX ADMIN — CALCITONIN SALMON 1 SPRAY: 200 SPRAY, METERED NASAL at 08:34

## 2018-12-03 RX ADMIN — IPRATROPIUM BROMIDE AND ALBUTEROL SULFATE 3 ML: .5; 3 SOLUTION RESPIRATORY (INHALATION) at 01:52

## 2018-12-03 RX ADMIN — SAXAGLIPTIN 5 MG: 5 TABLET, FILM COATED ORAL at 08:32

## 2018-12-03 RX ADMIN — PANTOPRAZOLE SODIUM 40 MG: 40 TABLET, DELAYED RELEASE ORAL at 06:01

## 2018-12-03 RX ADMIN — DULOXETINE 60 MG: 30 CAPSULE, DELAYED RELEASE ORAL at 08:32

## 2018-12-03 RX ADMIN — CLOPIDOGREL BISULFATE 75 MG: 75 TABLET ORAL at 08:33

## 2018-12-03 RX ADMIN — ONDANSETRON 4 MG: 4 TABLET, ORALLY DISINTEGRATING ORAL at 06:00

## 2018-12-03 RX ADMIN — Medication 10 ML: at 06:01

## 2018-12-03 RX ADMIN — IPRATROPIUM BROMIDE AND ALBUTEROL SULFATE 3 ML: .5; 3 SOLUTION RESPIRATORY (INHALATION) at 13:59

## 2018-12-03 RX ADMIN — IPRATROPIUM BROMIDE AND ALBUTEROL SULFATE 3 ML: .5; 3 SOLUTION RESPIRATORY (INHALATION) at 07:57

## 2018-12-03 RX ADMIN — MONTELUKAST SODIUM 10 MG: 10 TABLET, COATED ORAL at 08:33

## 2018-12-03 RX ADMIN — POTASSIUM CHLORIDE 10 MEQ: 750 TABLET, EXTENDED RELEASE ORAL at 08:33

## 2018-12-03 NOTE — PROGRESS NOTES
Problem: Mobility Impaired (Adult and Pediatric) Goal: *Acute Goals and Plan of Care (Insert Text) Physical Therapy Goals Revised 11/28/18 1. Patient will move from supine to sit and sit to supine , scoot up and down and roll side to side in bed with moderate assistance within 7 days. 2. Patient will perform sit to stand with maximum assistance within 7 days. 3. Patient will ambulate with maximum assistance 15' with the least restrictive device within 7 days. 4. Patient will verbalize and demonstrate understanding of spinal precautions (No bending, lifting greater than 5 lbs, or twisting; log-roll technique; frequent repositioning as instructed) within 4 days. Initiated 11/20/2018 1. Patient will move from supine to sit and sit to supine , scoot up and down and roll side to side in bed with independence within 4 days. 2. Patient will perform sit to stand with modified independence within 4 days. 3. Patient will ambulate with modified independence for 200 feet with the least restrictive device within 4 days. 4. Patient will verbalize and demonstrate understanding of spinal precautions (No bending, lifting greater than 5 lbs, or twisting; log-roll technique; frequent repositioning as instructed) within 4 days. physical Therapy TREATMENT Patient: Billy Akins (89 y.o. female) Date: 12/3/2018 Diagnosis: Dyspnea Dyspnea Dyspnea Procedure(s) (LRB): 
PACU/RECOVERY (N/A) 6 Days Post-Op Precautions: Fall, Skin Chart, physical therapy assessment, plan of care and goals were reviewed. ASSESSMENT: 
Pt seen this AM.Pt requesting to use bedpan. Out of bed to bedside commode was offered to pt but she declined. Pt performing rolling with mod assist for positioning of bedpan. PT will continue to follow. Progression toward goals: 
[]    Improving appropriately and progressing toward goals 
[]    Improving slowly and progressing toward goals []    Not making progress toward goals and plan of care will be adjusted PLAN: 
Patient continues to benefit from skilled intervention to address the above impairments. Continue treatment per established plan of care. Discharge Recommendations:  Brandon Banks Further Equipment Recommendations for Discharge:  rolling walker SUBJECTIVE:  
 
 
OBJECTIVE DATA SUMMARY:  
Critical Behavior: 
Neurologic State: Alert Orientation Level: Oriented X4 Cognition: Follows commands Safety/Judgement: Awareness of environment Functional Mobility Training: 
Bed Mobility: 
Rolling: Mod assist 
  
  
  
 
Pain: 
Pain Scale 1: Numeric (0 - 10) Pain Intensity 1: 6 Pain Intervention(s) 1: Medication (see MAR) Activity Tolerance:  
Pt tolerated treatment Please refer to the flowsheet for vital signs taken during this treatment. After treatment:  
[]    Patient left in no apparent distress sitting up in chair 
[x]    Patient left in no apparent distress in bed 
[]    Call bell left within reach 
[]    Nursing notified 
[]    Caregiver present 
[]    Bed alarm activated COMMUNICATION/COLLABORATION:  
The patients plan of care was discussed with: Physical Therapist 
 
Roel Justice PTA Time Calculation: 23 mins

## 2018-12-03 NOTE — PROGRESS NOTES
I have reviewed discharge instructions with the patient. The patient verbalized understanding. Patient set to be transferred to [facility] this afternoon. Signed copy of discharge instructions placed in patient's chart.

## 2018-12-03 NOTE — PROGRESS NOTES
Daily Progress Note and Discharge note: 12/3/2018 Génesis Nails MD 
 
Assessment/Plan: Dyspnea (11/13/2018) improved 
            - has completed steroid course 
  
  Type II diabetes mellitus ([facility] (10/9/2015) - with reported nephropathy with proteinuria without CKD 
            - continue saxagliptin 
  
  Coronary artery disease involving native coronary artery without angina pectoris (1/22/2016) - stable, continue cardiac meds 
             
   Essential hypertension (1/22/2016)- BP now running low with her prolonged bedrest 
            - decreased Coreg to 6.25mg BID 
            - decreased Lisinopril to 2.5 mg 
  
  Chronic anticoagulation (3/30/2018) 
            -therapeutic lovenox initially - now back on Xarelto,  
  
  Obesity, BMI 30-39. 9([facility]) (10/1/2018) - counseled on weight loss, this is contributing to her current symptoms 
  
  ARF (acute renal failure) ([facility] (11/13/2018) - creatinine up acutely from prior, possibly due to diuresis 
            - holding diuretic  
  
  Closed wedge compression fracture of T7 vertebra ([facility] (11/13/2018) - kypho 11/27 Anemia - Hb stable Diarrhea- improved Hypokalemia 
           - monitor and replete as needed General Debilitation 
           - accepted at [facility] Code status: 
            - patient is FULL CODE, no AMD on file, her daughter Rigoberto Lucia is her surrogate Dispo: needs SNF for rehab 
   
 
Problem List: 
Problem List as of 12/3/2018 Date Reviewed: 11/27/2018 Codes Class Noted - Resolved * (Principal) Dyspnea ICD-10-CM: R06.00 
ICD-9-CM: 786.09  11/13/2018 - Present ARF (acute renal failure) (HCC) ICD-10-CM: N17.9 ICD-9-CM: 584.9  11/13/2018 - Present Obesity (BMI 30-39.9) (Chronic) ICD-10-CM: J12.0 ICD-9-CM: 278.00  11/13/2018 - Present Closed wedge compression fracture of T7 vertebra ([facility] ICD-10-CM: E90.846S ICD-9-CM: 805.2  11/13/2018 - Present Type 2 diabetes with nephropathy ([facility] ICD-10-CM: E11.21 
ICD-9-CM: 250.40, 583.81  10/1/2018 - Present Chest pain ICD-10-CM: R07.9 ICD-9-CM: 786.50  6/27/2018 - Present Generalized abdominal pain ICD-10-CM: R10.84 ICD-9-CM: 789.07  6/27/2018 - Present Recurrent deep vein thrombosis (DVT) (HCC) ICD-10-CM: I82.409 ICD-9-CM: 453.40  3/30/2018 - Present Chronic anticoagulation (Chronic) ICD-10-CM: Z79.01 
ICD-9-CM: V58.61  3/30/2018 - Present Coronary artery disease involving native coronary artery without angina pectoris (Chronic) ICD-10-CM: I25.10 ICD-9-CM: 414.01  1/22/2016 - Present Essential hypertension (Chronic) ICD-10-CM: I10 
ICD-9-CM: 401.9  1/22/2016 - Present Type II diabetes mellitus (HCC) (Chronic) ICD-10-CM: E11.9 ICD-9-CM: 250.00  10/9/2015 - Present NSTEMI (non-ST elevated myocardial infarction) ([facility] ICD-10-CM: I21.4 ICD-9-CM: 410.70  10/9/2015 - Present RESOLVED: CAD (coronary artery disease) ICD-10-CM: I25.10 ICD-9-CM: 414.00  10/9/2015 - 1/22/2016 RESOLVED: HTN (hypertension) ICD-10-CM: I10 
ICD-9-CM: 401.9  10/9/2015 - 1/22/2016 Subjective:  
  81 y.o.  female who is admitted with SOB. Ms. Flynn Peralta presented to the Emergency Department this PM complaining of SOB: for the past 3 months, worked up by Pulmonary and Cardiology as an outpatient without any definite diagnosis, not hypoxic, associated with back pain, unable to take a deep breath without pain, back pain started at the same time as the SOB. History obtained from chart review and the patient. Previous records reviewed, recent admit for chest pain. (Dr Erick Jimenez). 11/14:  Feels much better this AM with less back pain on meds. Still very tender over site with any palpation.   Less air hunger but still \"not breathing back to normal yet. \"  Glucoses up with the steroids. Pul and Card seeing also. 11/15:    Reports she continues to feel better and reports no longer SOB. She is not moving much due to back pain. No chest pains. Ortho to decide next step for compression fx. No complaints of weakness or numbness LEs. 
 
11/16:  No longer short of breath - will wean steroids to po. IR plans kyphoplasty today. May benefit from Prolia for osteoporosis outpt  
 
11/17/18: Transferred to ICU yesterday for A-fib w/ RVR. Converted after Dilt bolus. Now sinus tach in the 100s. Coreg increased this am. She is not requiring a lot of pain meds but also not moving a lot. Xarelto being held and she is on Lovenox now. 11/18/18: Transferred out of ICU yesterday. Having pain with any movement. Dr Ana Noel would like her to stay on Plavix but is ok holding Xarelto/Lovenox for kyphoplasty. Will need to see if IR can do kyphoplasty on Plavix. Ortho getting her fitted for brace. Breathing well. No SOB. 11/19:  Little pain unless she moves about. Kyphoplasty planned for today. 11/20/18: Kyphoplasty now planned for Monday after holding Plavix. She would like to go home and return on Monday as an outpatient for the procedure. Was able to sit in chair yesterday. 11/22:  Pt was seen by PT yesterday. Rec that she needs SNF or rehab. She wants to stay here until MOnday when she gets kyphoplasty at [facility]. The logistics of this have not been coordinated yet. Will wait until tomorrow when IR is open to further figure out her transfer. Tolerating PO, +BM. 11/23: Dr Simi Buck to do kyphoplasty today at [address] notes she has limited IV access for lab draws. Hopefully will be able to move toward discharge thanks to today's procedure. Will reevaluate her need for IV after procedure. I don't want to get a central line if she will be discharged tomorrow. Pt eager to get the procedure done.   Her preference would be to go home with [place] [facility]. Tolerated PO yesterday, NPO this AM. 
 
11/24: kyphoplasty was cancelled yesterday due to staffing issues. Rescheduled for Monday at 2pm, here at [facility] by Dr Grover Ventura. PT unwilling to work with PT yesterday due to pain. Ongoing nausea and hypotensive yesterday after IV compazine. LImited IV access, although she doesn't really need it. Will try oral zofran now. 11/25: No AM labs for 2d as pt is difficult stick and has poor IV access. THis is ok with me as she is awaiting placement after her kyphoplasty. BPs on the low side again last night. Will leave parameters for her BP meds. PT still refusing to get up with PT because \"I was told not to move and to be on complete bed rest by the IR doctor. \"  Does note she has a HA today. 11/26:  No complaints other than back pain. Kyphoplasty again planned for later today. K+ 2.6 and Mag 1.3 this AM - replacing. Stable to have kyphoplasty today. 11/27:  No complaints other than back pain. Kyphoplasty again postponed yesterday - attempting to get it done again today. AM labs pending. BP was low yesterday but responded to fluids - never was in any distress - was likely a little dehydrated and from lying constantly in bed. Very difficult to get IV sites/ labs from her - AM labs pending - need to at least get lab for K+ and Mg+ - other RNs will try this AM.  
 
11/28: Had kypho yesterday. Feels much better with less pain. BP on the low side so will decrease Coreg. Hopefully can work with PT. She lives alone so would benefit from rehab. She has been at [facility] in the past. Restart Plavix. 11/29:  4-5 loose stools yesterday but none overnight nurses report. Hb at 9.6. Checking SFOB. Mag still low - replacing but will change to IV with her diarrhea. Creat better. 11/30: Only had 1 loose stool. Was able to get to the BR with PT yesterday. AM labs pending. Should be ready for discharge to [facility]. : She wants to go home as SNF not affordable. She has family help at home but is not sure for how long. She could go with [facility] but I am not sure her family will be able to care for her. Discussed need for personal care and she and her daughter will look at agencies to see if they can afford. She needs to be up in the chair for meals. Discussed the longer she is in bed the more weakness she will have.   
 
: Was up in chair yesterday- required a lot of assistance. She c/o pain while up in the chair but stayed up for 45 minutes. Will try again today. Do not see a note yet from OT. She would be better served in SNF as she requires much assistance. 12/3:   C/O nausea off and on - zofran not helping much and BP goes down with other nausea meds - BP runs chronically low in any case - will decrease Cogreg to 6.25 BID. Still weak and not wanting to be up much. Breathing ok. No other complaints. 1PM - accepted at [facility] for rehab. Stable to go to rehab. Follow up with Dr Cory Martinez when out of rehab. Review of Systems: A comprehensive review of systems was negative except for that written in the HPI. Objective:  
Physical Exam:  
Visit Vitals BP (!) 88/49 (BP 1 Location: Left arm, BP Patient Position: At rest) Pulse 93 Temp 98.1 °F (36.7 °C) Resp 15 Ht 5' 7\" (1.702 m) Wt 99.4 kg (219 lb 3.2 oz) SpO2 97% Breastfeeding? No  
BMI 35.38 kg/m² O2 Flow Rate (L/min): 3 l/min O2 Device: Nasal cannula Temp (24hrs), Av.2 °F (36.8 °C), Min:97.5 °F (36.4 °C), Max:98.5 °F (36.9 °C) No intake/output data recorded. 701 -  1900 In: -  
Out: 2804 [ERCIR:[id number]] General:  cooperative, obese, no distress, appears stated age. Head:  Normocephalic, without obvious abnormality, atraumatic. Eyes:  Conjunctivae/corneas clear. PERRL, EOMs intact Throat: Lips, mucosa, and tongue moist.  
Neck: Supple, symmetrical, trachea midline, no adenopathy, thyroid: no enlargement/tenderness/nodules, no carotid bruit and no JVD. Back:   Symmetric, much less tenderness to palpation over T6-7-8 area. Lungs:   Diminished BS bilaterally but otherwise clear at this time. Chest wall:  No tenderness or deformity. Heart:  Irregular rate and rhythm, no murmur, click, rub or gallop. Abdomen:   Soft, non-tender. Bowel sounds normal. No masses,  No organomegaly. Extremities: no cyanosis. Trace LE edema. No calf tenderness or cords. Skin:  turgor normal.  bruising on arms sl worse Neurologic: CNII-XII intact. Alert and oriented X 3. Fine motor of hands and fingers normal.   equal.  Gait not tested at this time. Sensation grossly normal to touch. Gross motor of extremities normal.    
 
Data Review:  
  CT of Chest 11/13/18 FINDINGS: 
THYROID: No nodule. MEDIASTINUM: No mass or lymphadenopathy. SONJA: No mass or lymphadenopathy. THORACIC AORTA: No aneurysm. Mild vascular calcifications MAIN PULMONARY ARTERY: Normal in caliber. TRACHEA/BRONCHI: Patent. ESOPHAGUS: No wall thickening or dilatation. Small hiatal hernia HEART: Normal in size. PLEURA: No effusion or pneumothorax. LUNGS: No nodule, mass, or airspace disease. Bilateral atelectatic changes. INCIDENTALLY IMAGED UPPER ABDOMEN: No focal abnormality. BONES: Bones are osteopenic. There is collapse of the superior endplate of T7. This has occurred since June 2018 Possible milk of calcium cyst within the right breast 
IMPRESSION: 
1. No acute cardiopulmonary disease 2. Interval collapse superior endplate of T7. The patient has back pain this may 
be acute to subacute and MR imaging would better characterize 
  
Recent Days: 
Recent Labs 12/02/18 
[phone] 12/01/18 
4920 WBC 6.0 5.3 HGB 10.8* 9.8* HCT 34.8* 31.3*  
 181 Recent Labs 12/02/18 
[phone] 12/01/18 
8664  144  
K 3.9 3.2*  
* 109* CO2 23 23 * 110* BUN 7 9 CREA 0.95 1.11* CA 8.3* 7.7*  
 MG 1.2* 1.2* No results for input(s): PH, PCO2, PO2, HCO3, FIO2 in the last 72 hours. 24 Hour Results: 
Recent Results (from the past 24 hour(s)) GLUCOSE, POC Collection Time: 12/02/18  6:49 AM  
Result Value Ref Range Glucose (POC) 150 (H) 65 - 100 mg/dL Performed by Bright Andersen (PCT) GLUCOSE, POC Collection Time: 12/02/18 11:49 AM  
Result Value Ref Range Glucose (POC) 138 (H) 65 - 100 mg/dL Performed by Jennifer Sharpe (PCT) GLUCOSE, POC Collection Time: 12/02/18  4:15 PM  
Result Value Ref Range Glucose (POC) 134 (H) 65 - 100 mg/dL Performed by Jennifer Sharpe (PCT) GLUCOSE, POC Collection Time: 12/02/18  8:50 PM  
Result Value Ref Range Glucose (POC) 159 (H) 65 - 100 mg/dL Performed by Bright Andersen (PCT) Medications reviewed Current Facility-Administered Medications Medication Dose Route Frequency  potassium chloride SR (KLOR-CON 10) tablet 20 mEq  20 mEq Oral BID  
 guaiFENesin-dextromethorphan (ROBITUSSIN DM) 100-10 mg/5 mL syrup 10 mL  2 tsp Oral Q6H PRN  
 lisinopril (PRINIVIL, ZESTRIL) tablet 2.5 mg  2.5 mg Oral DAILY  carvedilol (COREG) tablet 12.5 mg  12.5 mg Oral BID WITH MEALS  clopidogrel (PLAVIX) tablet 75 mg  75 mg Oral DAILY  rivaroxaban (XARELTO) tablet 20 mg  20 mg Oral DAILY WITH DINNER  
 ondansetron (ZOFRAN ODT) tablet 4 mg  4 mg Oral Q8H PRN  
 calcitonin (salmon) (MIACALCIN) nasal 1 Spray  1 [place] IntraNASal DAILY  arformoterol (BROVANA) neb solution 15 mcg  15 mcg Nebulization BID RT  
 budesonide (PULMICORT) 500 mcg/2 ml nebulizer suspension  500 mcg Nebulization BID RT  
 albuterol-ipratropium (DUO-NEB) 2.5 MG-0.5 MG/3 ML  3 mL Nebulization Q6H RT  
 DULoxetine (CYMBALTA) capsule 60 mg  60 mg Oral DAILY  isosorbide mononitrate ER (IMDUR) tablet 30 mg  30 mg Oral DAILY  montelukast (SINGULAIR) tablet 10 mg  10 mg Oral DAILY  sAXagliptin (ONGLYZA) tablet 5 mg  5 mg Oral DAILY  sodium chloride (NS) flush 5-10 mL  5-10 mL IntraVENous Q8H  
 sodium chloride (NS) flush 5-10 mL  5-10 mL IntraVENous PRN  
 acetaminophen (TYLENOL) tablet 650 mg  650 mg Oral Q4H PRN  
 oxyCODONE-acetaminophen (PERCOCET) 5-325 mg per tablet 1 Tab  1 Tab Oral Q4H PRN  
 HYDROmorphone (PF) (DILAUDID) injection 0.5 mg  0.5 mg IntraVENous Q4H PRN  
 zolpidem (AMBIEN) tablet 5 mg  5 mg Oral QHS PRN  
 insulin lispro (HUMALOG) injection   SubCUTAneous QID WITH MEALS  glucose chewable tablet 16 g  4 Tab Oral PRN  
 dextrose (D50W) injection syrg 12.5-25 g  25-50 mL IntraVENous PRN  
 glucagon (GLUCAGEN) injection 1 mg  1 mg IntraMUSCular PRN  pantoprazole (PROTONIX) tablet 40 mg  40 mg Oral ACB&D Care Plan discussed with: Patient Total time spent with patient: 30 minutes.  
 
Zohra Gonzalez MD

## 2018-12-03 NOTE — PROGRESS NOTES
2057: Spoke to Dr. Suhail Sosa regarding BP and nausea. He stated to continue to monitor BP which has been low off and on throughout admission; cannot get additional nausea medicine such as compazine or phenergan due to risk of dropping BP more. 2045: Attempted to call Dr. Suhail Sosa twice regarding patient's decreased BP; had to leave a voicemail message.

## 2018-12-03 NOTE — DISCHARGE SUMMARY
Physician Discharge Summary Patient ID:   
Pooja Collado [id number] 
88 y.o. 
1945 Chloe Edouard MD 
 
Admit date: 11/13/2018 Discharge date and time: 12/3/2018 Admission Diagnoses: Dyspnea;Dyspnea; compression fx  throacic spine Chronic Diagnoses:   
Problem List as of 12/3/2018 Date Reviewed: 11/27/2018 Codes Class Noted - Resolved * (Principal) Dyspnea ICD-10-CM: R06.00 
ICD-9-CM: 786.09  11/13/2018 - Present ARF (acute renal failure) (HCC) ICD-10-CM: N17.9 ICD-9-CM: 584.9  11/13/2018 - Present Obesity (BMI 30-39.9) (Chronic) ICD-10-CM: T84.2 ICD-9-CM: 278.00  11/13/2018 - Present Closed wedge compression fracture of T7 vertebra ([facility] ICD-10-CM: M23.454X ICD-9-CM: 805.2  11/13/2018 - Present Type 2 diabetes with nephropathy ([facility].) ICD-10-CM: E11.21 
ICD-9-CM: 250.40, 583.81  10/1/2018 - Present Chest pain ICD-10-CM: R07.9 ICD-9-CM: 786.50  6/27/2018 - Present Generalized abdominal pain ICD-10-CM: R10.84 ICD-9-CM: 789.07  6/27/2018 - Present Recurrent deep vein thrombosis (DVT) (HCC) ICD-10-CM: I82.409 ICD-9-CM: 453.40  3/30/2018 - Present Chronic anticoagulation (Chronic) ICD-10-CM: Z79.01 
ICD-9-CM: V58.61  3/30/2018 - Present Coronary artery disease involving native coronary artery without angina pectoris (Chronic) ICD-10-CM: I25.10 ICD-9-CM: 414.01  1/22/2016 - Present Essential hypertension (Chronic) ICD-10-CM: I10 
ICD-9-CM: 401.9  1/22/2016 - Present Type II diabetes mellitus (HCC) (Chronic) ICD-10-CM: E11.9 ICD-9-CM: 250.00  10/9/2015 - Present NSTEMI (non-ST elevated myocardial infarction) ([facility] ICD-10-CM: I21.4 ICD-9-CM: 410.70  10/9/2015 - Present RESOLVED: CAD (coronary artery disease) ICD-10-CM: I25.10 ICD-9-CM: 414.00  10/9/2015 - 1/22/2016 RESOLVED: HTN (hypertension) ICD-10-CM: I10 
ICD-9-CM: 401.9  10/9/2015 - 1/22/2016 Discharge Medications:   
Current Discharge Medication List  
  
START taking these medications Details  
potassium chloride SR (KLOR-CON 10) 10 mEq tablet Take 1 Tab by mouth two (2) times a day. Qty: 60 Tab, Refills: 0  
  
magnesium oxide (MAG-OX) 400 mg tablet Take 1 Tab by mouth daily. Qty: 30 Tab, Refills: 0  
  
calcitonin, salmon, (MIACALCIN) nasal 1 [place] by IntraNASal route daily. Qty: 1 Bottle, Refills: 3  
  
ondansetron (ZOFRAN ODT) 4 mg disintegrating tablet Take 1 Tab by mouth every eight (8) hours as needed. Qty: 1 Tab, Refills: 0  
  
oxyCODONE-acetaminophen (PERCOCET) 5-325 mg per tablet Take 1 Tab by mouth every four (4) hours as needed. Max Daily Amount: 6 Tabs. Qty: 12 Tab, Refills: 0 Associated Diagnoses: Closed wedge compression fracture of seventh thoracic vertebra with routine healing, subsequent encounter CONTINUE these medications which have CHANGED Details  
carvedilol (COREG) 6.25 mg tablet Take 1 Tab by mouth two (2) times daily (with meals). Qty: 60 Tab, Refills: 0  
  
  
lisinopril (PRINIVIL, ZESTRIL) 2.5 mg tablet TAKE 1 TABLET BY MOUTH DAILY Qty: 90 Tab, Refills: 0 Associated Diagnoses: Essential hypertension with goal blood pressure less than 130/85 rivaroxaban (XARELTO) 20 mg tab tablet Take 1 Tab by mouth daily (with dinner). Qty: 90 Tab, Refills: 0 CONTINUE these medications which have NOT CHANGED Details  
tiotropium (SPIRIVA WITH HANDIHALER) 18 mcg inhalation capsule Take 1 Cap by inhalation daily. albuterol (PROVENTIL VENTOLIN) 2.5 mg /3 mL (0.083 %) nebulizer solution 2.5 mg by Nebulization route two (2) times a day. montelukast (SINGULAIR) 10 mg tablet Take 10 mg by mouth daily. isosorbide mononitrate ER (IMDUR) 30 mg tablet TAKE 1 TABLET BY MOUTH ONCE DAILY AS DIRECTED Qty: 90 Tab, Refills: 0 Comments: **Patient requests 90 days supply**  
  
omeprazole (PRILOSEC) 20 mg capsule Take 20 mg by mouth two (2) times a day. albuterol (PROAIR HFA) 90 mcg/actuation inhaler Take  by inhalation every four (4) hours as needed for Wheezing. clopidogrel (PLAVIX) 75 mg tab Take 75 mg by mouth daily. lactobacillus rhamnosus gg 10 billion cell (CULTURELLE) 10 billion cell capsule Take 1 Cap by mouth daily. acetaminophen (TYLENOL) 325 mg tablet Take 650 mg by mouth every six (6) hours as needed for Pain. loperamide (IMMODIUM) 2 mg tablet Take 2 mg by mouth two (2) times a day. biotin 10,000 mcg cap Take 1 Cap by mouth daily. saxagliptin (ONGLYZA) 5 mg tab tablet Take 5 mg by mouth daily. Takes at [employer] DULoxetine (CYMBALTA) 60 mg capsule Take 60 mg by mouth daily. cholecalciferol (VITAMIN D3) 1,000 unit tablet Take 1,000 Units by mouth daily. azelastine-fluticasone (DYMISTA) 137-50 mcg/spray spry 1 Spray by Nasal route two (2) times a day. mometasone-formoterol (DULERA) 200-5 mcg/actuation HFA inhaler Take 2 Puffs by inhalation two (2) times a day. STOP taking these medications  
  
 bumetanide (BUMEX) 0.5 mg tablet Comments:  
Reason for Stopping:   
   
 promethazine (PHENERGAN) 25 mg tablet Comments:  
Reason for Stopping:   
   
 HYDROcodone-acetaminophen (NORCO) 5-325 mg per tablet Comments:  
Reason for Stopping:   
   
 potassium chloride (K-DUR, KLOR-CON) 20 mEq tablet Comments:  
Reason for Stopping:   
   
 predniSONE (DELTASONE) 10 mg tablet Comments:  
Reason for Stopping: Follow up Care: 1. Nathan Anthony MD with in 1 weeks 2.  specialists as directed. Diet:  Cardiac Diet and Diabetic Diet Disposition: 
Rehab. Advanced Directive: 
Discharge Exam: [See today's progress note.] CONSULTATIONS: Cardiology, Pulmonary/Intensive care and Orthopedic Surgery Significant Diagnostic Studies:  
Recent Labs 12/02/18 
[phone] 12/01/18 
0507 WBC 6.0 5.3 HGB 10.8* 9.8* HCT 34.8* 31.3*  
 181 Recent Labs 12/02/18 
[phone] 12/01/18 
8356  144  
K 3.9 3.2*  
* 109* CO2 23 23 BUN 7 9 CREA 0.95 1.11* * 110* CA 8.3* 7.7* MG 1.2* 1.2* Lab Results Component Value Date/Time TSH 0.58 11/13/2018 03:26 PM  
 
HOSPITAL COURSE & TREATMENT RENDERED:  
1. See today's progress note: 
  
  
Daily Progress Note and Discharge note: 12/3/2018 Gisela Toscano MD 
  
   
Assessment/Plan: Dyspnea (11/13/2018) improved             - has completed steroid course 
  
  Type II diabetes mellitus ([facility] (10/9/2015)             - with reported nephropathy with proteinuria without CKD 
            - continue saxagliptin 
  
  Coronary artery disease involving native coronary artery without angina pectoris (1/22/2016)             - stable, continue cardiac meds 
             
   Essential hypertension (1/22/2016)- BP now running low with her prolonged bedrest 
            - decreased Coreg to 6.25mg BID 
            - decreased Lisinopril to 2.5 mg 
  
  Chronic anticoagulation (3/30/2018)             -QDYIZCOQAKG lovenox initially - now back on Xarelto,  
  
  Obesity, BMI 30-39. 9([facility]) (10/1/2018)             - AYLPOYCXB on weight loss, this is contributing to her current symptoms 
  
  ARF (acute renal failure) ([facility] (11/13/2018)             - creatinine up acutely from prior, possibly due to diuresis             - holding diuretic  
  
  Closed wedge compression fracture of T7 vertebra ([facility]) (11/13/2018)             - kypho 11/27 
  
  Anemia - Hb stable 
  
  Diarrhea- improved   Hypokalemia 
           - monitor and replete as needed 
  
  General Debilitation 
           - accepted at [address] [place] [address] [address] status: 
            - patient is FULL CODE, no AMD on file, her daughter Shelia Sorensen is her surrogate 
  
Dispo: needs SNF for rehab 
   
  
Subjective:  
  72 y.o.   female who is admitted with SOB.  Ms. Jalloh presented to the Emergency Department this PM complaining of SOB: for the past 3 months, worked up by Pulmonary and Cardiology as an outpatient without any definite diagnosis, not hypoxic, associated with back pain, unable to take a deep breath without pain, back pain started at the same time as the SOB. History obtained from chart review and the patient. Previous records reviewed, recent admit for chest pain. (Dr [facility]). 
  
11/14:  Feels much better this AM with less back pain on meds. Still very tender over site with any palpation. Less air hunger but still \"not breathing back to normal yet. \"  Glucoses up with the steroids. Pul and Card seeing also.   
  
11/15:    Reports she continues to feel better and reports no longer SOB. She is not moving much due to back pain. No chest pains. Ortho to decide next step for compression fx. No complaints of weakness or numbness LEs.   
11/16:  No longer short of breath - will wean steroids to po. IR plans kyphoplasty today. May benefit from Prolia for osteoporosis outpt  
  
11/17/18: Transferred to ICU yesterday for A-fib w/ RVR. Converted after Dilt bolus. Now sinus tach in the 100s. Coreg increased this am. She is not requiring a lot of pain meds but also not moving a lot. Xarelto being held and she is on Lovenox now.  
  
11/18/18: Transferred out of ICU yesterday. Having pain with any movement. Dr Polo Resendez would like her to stay on Plavix but is ok holding Xarelto/Lovenox for kyphoplasty. Will need to see if IR can do kyphoplasty on Plavix. Ortho getting her fitted for brace. Breathing well. No SOB.  
  
11/19:  Little pain unless she moves about. Kyphoplasty planned for today.   
  
11/20/18: Kyphoplasty now planned for Monday after holding Plavix. She would like to go home and return on Monday as an outpatient for the procedure. Was able to sit in chair yesterday.  
  
11/22:  Pt was seen by PT yesterday. Rec that she needs SNF or rehab. She wants to stay here until MOnday when she gets kyphoplasty at [facility]. The logistics of this have not been coordinated yet. Will wait until tomorrow when IR is open to further figure out her transfer. Tolerating PO, +BM. 
  
11/23: Dr Maria Teresa Fletcher to do kyphoplasty today at [address] notes she has limited IV access for lab draws. Hopefully will be able to move toward discharge thanks to today's procedure. Will reevaluate her need for IV after procedure. I don't want to get a central line if she will be discharged tomorrow. Pt eager to get the procedure done. Her preference would be to go home with [facility] PT. Tolerated PO yesterday, NPO this AM. 
  
11/24: kyphoplasty was cancelled yesterday due to staffing issues. Rescheduled for Monday at 2pm, here at [facility] by Dr Maria Teresa Fletcher. PT unwilling to work with PT yesterday due to pain. Ongoing nausea and hypotensive yesterday after IV compazine. LImited IV access, although she doesn't really need it. Will try oral zofran now. 
  
11/25: No AM labs for 2d as pt is difficult stick and has poor IV access. THis is ok with me as she is awaiting placement after her kyphoplasty. BPs on the low side again last night. Will leave parameters for her BP meds. PT still refusing to get up with PT because \"I was told not to move and to be on complete bed rest by the IR doctor. \"  Does note she has a HA today. 
  
11/26:  No complaints other than back pain. Kyphoplasty again planned for later today. K+ 2.6 and Mag 1.3 this AM - replacing. Stable to have kyphoplasty today.   
  
11/27:  No complaints other than back pain. Kyphoplasty again postponed yesterday - attempting to get it done again today. AM labs pending. BP was low yesterday but responded to fluids - never was in any distress - was likely a little dehydrated and from lying constantly in bed.   Very difficult to get IV sites/ labs from her - AM labs pending - need to at least get lab for K+ and Mg+ - other RNs will try this AM.  
  
11/28: Had kypho yesterday. Feels much better with less pain. BP on the low side so will decrease Coreg. Hopefully can work with PT. She lives alone so would benefit from rehab. She has been at [address] [place] [address] in the past. Restart Plavix.  
  
11/29:  4-5 loose stools yesterday but none overnight nurses report. Hb at 9.6. Checking SFOB. Mag still low - replacing but will change to IV with her diarrhea. Creat better.  
  
11/30: Only had 1 loose stool. Was able to get to the  with PT yesterday. AM labs pending. Should be ready for discharge to [address] [place] [address].  
  
12/1: She wants to go home as SNF not affordable. She has family help at home but is not sure for how long. She could go with [facility] but I am not sure her family will be able to care for her. Discussed need for personal care and she and her daughter will look at agencies to see if they can afford. She needs to be up in the chair for meals. Discussed the longer she is in bed the more weakness she will have.   
  
12/2: Was up in chair yesterday- required a lot of assistance. She c/o pain while up in the chair but stayed up for 45 minutes. Will try again today. Do not see a note yet from OT. She would be better served in SNF as she requires much assistance.  
  
12/3:   C/O nausea off and on - zofran not helping much and BP goes down with other nausea meds - BP runs chronically low in any case - will decrease Cogreg to 6.25 BID. Still weak and not wanting to be up much. Breathing ok. No other complaints. 1PM - accepted at [facility] for rehab. Stable to go to rehab. Follow up with Dr Moon John when out of rehab.   
  
Review of Systems: A comprehensive review of systems was negative except for that written in the HPI. 
  
Objective:  
Physical Exam:  
Visit Vitals BP (!) 88/49 (BP 1 Location: Left arm, BP Patient Position: At rest) Pulse 93 Temp 98.1 °F (36.7 °C) Resp 15  
 Ht 5' 7\" (1.702 m) Wt 99.4 kg (219 lb 3.2 oz) SpO2 97% Breastfeeding? No  
BMI 35.38 kg/m² O2 Flow Rate (L/min): 3 l/min O2 Device: Nasal cannula 
  
Temp (24hrs), Av.2 °F (36.8 °C), Min:97.5 °F (36.4 °C), Max:98.5 °F (36.9 °C) No intake/output data recorded.  07 -  1900 In: -  
Out: 1574 [LLECK:[id number]] General:  cooperative, obese, no distress, appears stated age. Head:  Normocephalic, without obvious abnormality, atraumatic. Eyes:  Conjunctivae/corneas clear. PERRL, EOMs intact Throat: Lips, mucosa, and tongue moist.  
Neck: Supple, symmetrical, trachea midline, no adenopathy, thyroid: no enlargement/tenderness/nodules, no carotid bruit and no JVD. Back:   Symmetric, much less tenderness to palpation over T6-7-8 area. Lungs:   Diminished BS bilaterally but otherwise clear at this time. Chest wall:  No tenderness or deformity. Heart:  Irregular rate and rhythm, no murmur, click, rub or gallop. Abdomen:   Soft, non-tender. Bowel sounds normal. No masses,  No organomegaly. Extremities: no cyanosis. Trace LE edema. No calf tenderness or cords. Skin:  turgor normal.  bruising on arms sl worse Neurologic: CNII-XII intact. Alert and oriented X 3. Fine motor of hands and fingers normal.   equal.  Gait not tested at this time. Sensation grossly normal to touch. Gross motor of extremities normal.    
  
Data Review:  
  CT of Chest 18 FINDINGS: 
THYROID: No nodule. MEDIASTINUM: No mass or lymphadenopathy. SONJA: No mass or lymphadenopathy. THORACIC AORTA: No aneurysm. Mild vascular calcifications MAIN PULMONARY ARTERY: Normal in caliber. TRACHEA/BRONCHI: Patent. ESOPHAGUS: No wall thickening or dilatation. Small hiatal hernia HEART: Normal in size. PLEURA: No effusion or pneumothorax. LUNGS: No nodule, mass, or airspace disease. Bilateral atelectatic changes. INCIDENTALLY IMAGED UPPER ABDOMEN: No focal abnormality. BONES: Bones are osteopenic. There is collapse of the superior endplate of T7. This has occurred since June 2018 Possible milk of calcium cyst within the right breast 
IMPRESSION: 
1. No acute cardiopulmonary disease 2. Interval collapse superior endplate of T7. The patient has back pain this may 
be acute to subacute and MR imaging would better characterize Signed: 
Ivis Espinoza MD 
12/3/2018 
2:02 PM

## 2018-12-03 NOTE — PROGRESS NOTES
Bedside and Verbal shift change report given to [address] [place] [address] (oncoming nurse) by Fanny Garcia (offgoing nurse). Report included the following information SBAR, Kardex, Intake/Output, MAR, Accordion and Recent Results.

## 2018-12-03 NOTE — PROGRESS NOTES
Per chart review, Pt has been assessed by OT. CM sent OT notes to [address] [place] [address] to consider for rehab.  
 
11:40am: [facility] is willing to accept Pt. CM confirmed that SNF stay would be covered under Pt's Medicare benefits. 1:45pm: CM notified attending of Pt's acceptance to [facility]. CM was informed that Pt is medically cleared to discharge. 2:00pm: CM spoke with Pt about acceptance to [facility]. Pt was receptive. [facility] is able to accommodate Pt at 4:30pm today. Call report:  
[phone]  
 room 312. PCS, Facesheet, H&P placed on chart. Care Management Interventions PCP Verified by CM: Yes(dr jignesh singh no nn) Mode of Transport at Discharge: BLS([facility]) Transition of Care Consult (CM Consult): Discharge Planning Physical Therapy Consult: Yes Occupational Therapy Consult: Yes Speech Therapy Consult: No 
Current Support Network: Lives Alone Confirm Follow Up Transport: Family Plan discussed with Pt/Family/Caregiver: Yes Discharge Location Discharge Placement: Skilled nursing facility([facility]) CM will continue to follow for discharge planning. [address], BS, [address] [facility]

## 2018-12-03 NOTE — DISCHARGE INSTRUCTIONS
Patient Discharge Instructions    Sandra Laboy / [id number] : 1945    Admitted 2018 Discharged: 12/3/2018 2:02 PM     ACUTE DIAGNOSES:  Dyspnea  Dyspnea    CHRONIC MEDICAL DIAGNOSES:  Problem List as of 12/3/2018 Date Reviewed: 2018          Codes Class Noted - Resolved    * (Principal) Dyspnea ICD-10-CM: R06.00  ICD-9-CM: 786.09  2018 - Present        ARF (acute renal failure) ([facility] ICD-10-CM: N17.9  ICD-9-CM: 584.9  2018 - Present        Obesity (BMI 30-39.9) (Chronic) ICD-10-CM: E66.9  ICD-9-CM: 278.00  2018 - Present        Closed wedge compression fracture of T7 vertebra ([facility] ICD-10-CM: S22.060A  ICD-9-CM: 805.2  2018 - Present        Type 2 diabetes with nephropathy ([facility] ICD-10-CM: E11.21  ICD-9-CM: 250.40, 583.81  10/1/2018 - Present        Chest pain ICD-10-CM: R07.9  ICD-9-CM: 786.50  2018 - Present        Generalized abdominal pain ICD-10-CM: R10.84  ICD-9-CM: 789.07  2018 - Present        Recurrent deep vein thrombosis (DVT) ([facility] ICD-10-CM: I82.409  ICD-9-CM: 453.40  3/30/2018 - Present        Chronic anticoagulation (Chronic) ICD-10-CM: Z79.01  ICD-9-CM: V58.61  3/30/2018 - Present        Coronary artery disease involving native coronary artery without angina pectoris (Chronic) ICD-10-CM: I25.10  ICD-9-CM: 414.01  2016 - Present        Essential hypertension (Chronic) ICD-10-CM: I10  ICD-9-CM: 401.9  2016 - Present        Type II diabetes mellitus ([facility] (Chronic) ICD-10-CM: E11.9  ICD-9-CM: 250.00  10/9/2015 - Present        NSTEMI (non-ST elevated myocardial infarction) ([facility] ICD-10-CM: I21.4  ICD-9-CM: 410.70  10/9/2015 - Present        RESOLVED: CAD (coronary artery disease) ICD-10-CM: I25.10  ICD-9-CM: 414.00  10/9/2015 - 2016        RESOLVED: HTN (hypertension) ICD-10-CM: I10  ICD-9-CM: 401.9  10/9/2015 - 2016              DISCHARGE MEDICATIONS:         · It is important that you take the medication exactly as they are prescribed. · Keep your medication in the bottles provided by the pharmacist and keep a list of the medication names, dosages, and times to be taken in your wallet. · Do not take other medications without consulting your doctor. DIET:  Cardiac Diet and Diabetic Diet    ACTIVITY: Activity as tolerated    ADDITIONAL INFORMATION: If you experience any of the following symptoms then please call your primary care physician or return to the emergency room if you cannot get hold of your doctor: Fever, chills, nausea, vomiting, diarrhea, change in mentation, falling, bleeding, shortness of breath. FOLLOW UP CARE:  Dr. Emmanuel Bell MD  you are to call and set up an appointment to see them with in 1 week. Follow-up with specialists at directed by them      Information obtained by :  I understand that if any problems occur once I am at home I am to contact my physician. I understand and acknowledge receipt of the instructions indicated above.                                                                                                                                            Physician's or R.N.'s Signature                                                                  Date/Time                                                                                                                                              Patient or Representative Signature                                                          Date/Time

## 2018-12-03 NOTE — PROGRESS NOTES
This RN and phlebotomy were unsuccessful with morning lab draws. 12/2/18   1900 - Per day nurse pt performed the sit to stand and sat in the chair for 2 hours for dinner

## 2018-12-03 NOTE — PROGRESS NOTES
Problem: Self Care Deficits Care Plan (Adult) Goal: *Acute Goals and Plan of Care (Insert Text) Occupational Therapy Goals Initiated 12/2/2018 1. Patient will perform lower body dressing with maximal assistance using adaptive equipment within 7 day(s). 2.  Patient will perform toilet transfers to drop arm [facility] with maximal assistance within 7 day(s). 3.  Patient will perform all aspects of toileting with maximal assistance within 7 day(s). 4.  Patient will participate in upper extremity therapeutic exercise/activities with independence for 10 minutes within 7 day(s). 5.  Patient will utilize energy conservation techniques during functional activities with verbal and visual cues within 7 day(s). Occupational Therapy TREATMENT Patient: Tommy Serrano (62 y.o. female) Date: 12/3/2018 Diagnosis: Dyspnea Dyspnea Dyspnea Procedure(s) (LRB): 
PACU/RECOVERY (N/A) 6 Days Post-Op Precautions: Fall, Skin Chart, occupational therapy assessment, plan of care, and goals were reviewed. ASSESSMENT: 
Patient received supine in bed, call light blinking. Patient reports urgent need for toileting for bowel movement. Patient offered option for up with therapy for transfer to [facility]. Patient declined reporting urgency. Patient noted bowel on pad under patient when placing bedpan. She requires min assist to roll to left side for OT to place bed pan. Minimal roll to right side to remove soiled pad. Patient requesting HOB elevated for improved position on bedpan. Limited activity today secondary to bowel urgency. Progression toward goals: 
[x]       Improving appropriately and progressing toward goals 
[]       Improving slowly and progressing toward goals 
[]       Not making progress toward goals and plan of care will be adjusted PLAN: 
Patient continues to benefit from skilled intervention to address the above impairments. Continue treatment per established plan of care. Discharge Recommendations:  Brandon Banks Further Equipment Recommendations for Discharge:  TBD SUBJECTIVE:  
Patient stated I wont make it to the commode.  OBJECTIVE DATA SUMMARY:  
Cognitive/Behavioral Status: 
Neurologic State: Alert Orientation Level: Oriented X4 Cognition: Follows commands Perception: Appears intact Perseveration: No perseveration noted Safety/Judgement: Awareness of environment Functional Mobility and Transfers for ADLs:Bed Mobility: 
Rolling: Minimum assistance Transfers: 
  
Functional Transfers Toilet Transfer : Total assistance(to bed pan) Cues: Verbal cues provided;Physical assistance Balance: ADL Intervention: Toileting Toileting Assistance: Total assistance(dependent) Bladder Hygiene: Total assistance (dependent) Bowel Hygiene: Total assistance (dependent) Clothing Management: Total assistance (dependent) Cues: Verbal cues provided Cognitive Retraining Safety/Judgement: Awareness of environment Neuro Re-Education: 
  
  
  
Therapeutic Exercises:  
Pain: 
Pain Scale 1: Numeric (0 - 10) Pain Intensity 1: 6 Pain Intervention(s) 1: Medication (see MAR) Activity Tolerance: VSS Please refer to the flowsheet for vital signs taken during this treatment. After treatment:  
[] Patient left in no apparent distress sitting up in chair 
[x] Patient left in no apparent distress in bed 
[x] Call bell left within reach [x] Nursing notified 
[] Caregiver present [x] Bed alarm activated COMMUNICATION/COLLABORATION:  
The patients plan of care was discussed with: Physical Therapist and Registered Nurse RAMYA Garvin/L Time Calculation: 16 mins

## 2018-12-03 NOTE — PROGRESS NOTES
TRANSFER - OUT REPORT: 
 
Verbal report given to Yola Land RN (name) on Sandra Laboy  being transferred to [facility](unit) for routine progression of care Report consisted of patients Situation, Background, Assessment and  
Recommendations(SBAR). Information from the following report(s) SBAR, Kardex, Procedure Summary, Intake/Output, MAR, Accordion and Recent Results was reviewed with the receiving nurse. Lines:  
Peripheral IV 11/27/18 Right;Upper Cephalic (Active) Site Assessment Clean, dry, & intact 12/3/2018  8:40 AM  
Phlebitis Assessment 0 12/3/2018  8:40 AM  
Infiltration Assessment 0 12/3/2018  8:40 AM  
Dressing Status Clean, dry, & intact 12/3/2018  8:40 AM  
Dressing Type Transparent;Tape 12/3/2018  8:40 AM  
Hub Color/Line Status Pink;Capped 12/3/2018  8:40 AM  
Action Taken Open ports on tubing capped 12/3/2018  8:40 AM  
Alcohol Cap Used Yes 12/3/2018  8:40 AM  
  
 
Opportunity for questions and clarification was provided. Patient transported with: 
 O2 @ 2 liters

## 2019-01-05 ENCOUNTER — APPOINTMENT (OUTPATIENT)
Dept: CT IMAGING | Age: 74
DRG: 390 | End: 2019-01-05
Attending: EMERGENCY MEDICINE
Payer: MEDICARE

## 2019-01-05 ENCOUNTER — APPOINTMENT (OUTPATIENT)
Dept: GENERAL RADIOLOGY | Age: 74
DRG: 390 | End: 2019-01-05
Attending: EMERGENCY MEDICINE
Payer: MEDICARE

## 2019-01-05 ENCOUNTER — HOSPITAL ENCOUNTER (INPATIENT)
Age: 74
LOS: 1 days | Discharge: HOME HEALTH | DRG: 390 | End: 2019-01-08
Attending: EMERGENCY MEDICINE | Admitting: FAMILY MEDICINE
Payer: MEDICARE

## 2019-01-05 DIAGNOSIS — R19.7 DIARRHEA, UNSPECIFIED TYPE: ICD-10-CM

## 2019-01-05 DIAGNOSIS — R11.2 NAUSEA AND VOMITING, INTRACTABILITY OF VOMITING NOT SPECIFIED, UNSPECIFIED VOMITING TYPE: Primary | ICD-10-CM

## 2019-01-05 DIAGNOSIS — K56.51 INTESTINAL ADHESIONS WITH PARTIAL OBSTRUCTION (HCC): ICD-10-CM

## 2019-01-05 PROBLEM — I48.91 ATRIAL FIBRILLATION (HCC): Status: ACTIVE | Noted: 2018-11-16

## 2019-01-05 PROBLEM — G47.30 SLEEP APNEA: Status: ACTIVE | Noted: 2019-01-05

## 2019-01-05 PROBLEM — K56.600 PARTIAL SMALL BOWEL OBSTRUCTION (HCC): Status: ACTIVE | Noted: 2019-01-05

## 2019-01-05 LAB
ALBUMIN SERPL-MCNC: 3 G/DL (ref 3.5–5)
ALBUMIN/GLOB SERPL: 0.8 {RATIO} (ref 1.1–2.2)
ALP SERPL-CCNC: 205 U/L (ref 45–117)
ALT SERPL-CCNC: 96 U/L (ref 12–78)
ANION GAP SERPL CALC-SCNC: 17 MMOL/L (ref 5–15)
AST SERPL-CCNC: 82 U/L (ref 15–37)
BASOPHILS # BLD: 0.1 K/UL (ref 0–0.1)
BASOPHILS NFR BLD: 1 % (ref 0–1)
BILIRUB SERPL-MCNC: 1 MG/DL (ref 0.2–1)
BUN SERPL-MCNC: 16 MG/DL (ref 6–20)
BUN/CREAT SERPL: 14 (ref 12–20)
CALCIUM SERPL-MCNC: 9.3 MG/DL (ref 8.5–10.1)
CHLORIDE SERPL-SCNC: 108 MMOL/L (ref 97–108)
CO2 SERPL-SCNC: 17 MMOL/L (ref 21–32)
CREAT SERPL-MCNC: 1.12 MG/DL (ref 0.55–1.02)
DIFFERENTIAL METHOD BLD: ABNORMAL
EOSINOPHIL # BLD: 0.4 K/UL (ref 0–0.4)
EOSINOPHIL NFR BLD: 4 % (ref 0–7)
ERYTHROCYTE [DISTWIDTH] IN BLOOD BY AUTOMATED COUNT: 16.6 % (ref 11.5–14.5)
GLOBULIN SER CALC-MCNC: 3.9 G/DL (ref 2–4)
GLUCOSE BLD STRIP.AUTO-MCNC: 105 MG/DL (ref 65–100)
GLUCOSE SERPL-MCNC: 145 MG/DL (ref 65–100)
HCT VFR BLD AUTO: 44 % (ref 35–47)
HGB BLD-MCNC: 12.9 G/DL (ref 11.5–16)
IMM GRANULOCYTES # BLD: 0.1 K/UL (ref 0–0.04)
IMM GRANULOCYTES NFR BLD AUTO: 1 % (ref 0–0.5)
LACTATE BLD-SCNC: 1.87 MMOL/L (ref 0.4–2)
LYMPHOCYTES # BLD: 2.2 K/UL (ref 0.8–3.5)
LYMPHOCYTES NFR BLD: 21 % (ref 12–49)
MCH RBC QN AUTO: 30.4 PG (ref 26–34)
MCHC RBC AUTO-ENTMCNC: 29.3 G/DL (ref 30–36.5)
MCV RBC AUTO: 103.5 FL (ref 80–99)
MONOCYTES # BLD: 1 K/UL (ref 0–1)
MONOCYTES NFR BLD: 9 % (ref 5–13)
NEUTS SEG # BLD: 6.7 K/UL (ref 1.8–8)
NEUTS SEG NFR BLD: 65 % (ref 32–75)
NRBC # BLD: 0 K/UL (ref 0–0.01)
NRBC BLD-RTO: 0 PER 100 WBC
PLATELET # BLD AUTO: 351 K/UL (ref 150–400)
PMV BLD AUTO: 9.6 FL (ref 8.9–12.9)
POTASSIUM SERPL-SCNC: 3.9 MMOL/L (ref 3.5–5.1)
PROT SERPL-MCNC: 6.9 G/DL (ref 6.4–8.2)
RBC # BLD AUTO: 4.25 M/UL (ref 3.8–5.2)
SERVICE CMNT-IMP: ABNORMAL
SODIUM SERPL-SCNC: 142 MMOL/L (ref 136–145)
TROPONIN I SERPL-MCNC: <0.05 NG/ML
WBC # BLD AUTO: 10.4 K/UL (ref 3.6–11)

## 2019-01-05 PROCEDURE — 74011250637 HC RX REV CODE- 250/637: Performed by: INTERNAL MEDICINE

## 2019-01-05 PROCEDURE — 77030038269 HC DRN EXT URIN PURWCK BARD -A

## 2019-01-05 PROCEDURE — 36415 COLL VENOUS BLD VENIPUNCTURE: CPT

## 2019-01-05 PROCEDURE — 77030018846 HC SOL IRR STRL H20 ICUM -A

## 2019-01-05 PROCEDURE — 84484 ASSAY OF TROPONIN QUANT: CPT

## 2019-01-05 PROCEDURE — 83605 ASSAY OF LACTIC ACID: CPT

## 2019-01-05 PROCEDURE — 85025 COMPLETE CBC W/AUTO DIFF WBC: CPT

## 2019-01-05 PROCEDURE — 99218 HC RM OBSERVATION: CPT

## 2019-01-05 PROCEDURE — 82962 GLUCOSE BLOOD TEST: CPT

## 2019-01-05 PROCEDURE — 80053 COMPREHEN METABOLIC PANEL: CPT

## 2019-01-05 PROCEDURE — 74011250636 HC RX REV CODE- 250/636: Performed by: EMERGENCY MEDICINE

## 2019-01-05 PROCEDURE — 71045 X-RAY EXAM CHEST 1 VIEW: CPT

## 2019-01-05 PROCEDURE — 99285 EMERGENCY DEPT VISIT HI MDM: CPT

## 2019-01-05 PROCEDURE — 74177 CT ABD & PELVIS W/CONTRAST: CPT

## 2019-01-05 PROCEDURE — 96374 THER/PROPH/DIAG INJ IV PUSH: CPT

## 2019-01-05 PROCEDURE — 74011250636 HC RX REV CODE- 250/636: Performed by: INTERNAL MEDICINE

## 2019-01-05 PROCEDURE — 74011636320 HC RX REV CODE- 636/320: Performed by: RADIOLOGY

## 2019-01-05 PROCEDURE — 96361 HYDRATE IV INFUSION ADD-ON: CPT

## 2019-01-05 RX ORDER — SIMETHICONE 80 MG
80 TABLET,CHEWABLE ORAL
Status: ON HOLD | COMMUNITY
End: 2019-04-26

## 2019-01-05 RX ORDER — MORPHINE SULFATE 4 MG/ML
2 INJECTION INTRAVENOUS
Status: DISCONTINUED | OUTPATIENT
Start: 2019-01-05 | End: 2019-01-08 | Stop reason: HOSPADM

## 2019-01-05 RX ORDER — POLYETHYLENE GLYCOL 3350 17 G/17G
17 POWDER, FOR SOLUTION ORAL
Status: ON HOLD | COMMUNITY
End: 2019-04-26

## 2019-01-05 RX ORDER — ONDANSETRON 4 MG/1
4 TABLET, ORALLY DISINTEGRATING ORAL
Status: DISCONTINUED | OUTPATIENT
Start: 2019-01-05 | End: 2019-01-08 | Stop reason: HOSPADM

## 2019-01-05 RX ORDER — METOPROLOL TARTRATE 25 MG/1
25 TABLET, FILM COATED ORAL 2 TIMES DAILY
COMMUNITY
End: 2019-05-24

## 2019-01-05 RX ORDER — HYDRALAZINE HYDROCHLORIDE 20 MG/ML
20 INJECTION INTRAMUSCULAR; INTRAVENOUS
Status: DISCONTINUED | OUTPATIENT
Start: 2019-01-05 | End: 2019-01-08 | Stop reason: HOSPADM

## 2019-01-05 RX ORDER — DEXTROSE 50 % IN WATER (D50W) INTRAVENOUS SYRINGE
25-50 AS NEEDED
Status: DISCONTINUED | OUTPATIENT
Start: 2019-01-05 | End: 2019-01-08 | Stop reason: HOSPADM

## 2019-01-05 RX ORDER — MIRTAZAPINE 15 MG/1
15 TABLET, FILM COATED ORAL
COMMUNITY
Start: 2021-01-01

## 2019-01-05 RX ORDER — SODIUM CHLORIDE, SODIUM LACTATE, POTASSIUM CHLORIDE, CALCIUM CHLORIDE 600; 310; 30; 20 MG/100ML; MG/100ML; MG/100ML; MG/100ML
75 INJECTION, SOLUTION INTRAVENOUS CONTINUOUS
Status: DISCONTINUED | OUTPATIENT
Start: 2019-01-05 | End: 2019-01-07

## 2019-01-05 RX ORDER — FACIAL-BODY WIPES
10 EACH TOPICAL
Status: ON HOLD | COMMUNITY
End: 2019-04-26

## 2019-01-05 RX ORDER — ONDANSETRON 2 MG/ML
4 INJECTION INTRAMUSCULAR; INTRAVENOUS
Status: COMPLETED | OUTPATIENT
Start: 2019-01-05 | End: 2019-01-05

## 2019-01-05 RX ORDER — SODIUM CHLORIDE 0.9 % (FLUSH) 0.9 %
5-10 SYRINGE (ML) INJECTION AS NEEDED
Status: DISCONTINUED | OUTPATIENT
Start: 2019-01-05 | End: 2019-01-08 | Stop reason: HOSPADM

## 2019-01-05 RX ORDER — PANTOPRAZOLE SODIUM 20 MG/1
20 TABLET, DELAYED RELEASE ORAL
COMMUNITY
End: 2019-05-17

## 2019-01-05 RX ORDER — INSULIN LISPRO 100 [IU]/ML
INJECTION, SOLUTION INTRAVENOUS; SUBCUTANEOUS
Status: DISCONTINUED | OUTPATIENT
Start: 2019-01-05 | End: 2019-01-08 | Stop reason: HOSPADM

## 2019-01-05 RX ORDER — MAGNESIUM SULFATE 100 %
4 CRYSTALS MISCELLANEOUS AS NEEDED
Status: DISCONTINUED | OUTPATIENT
Start: 2019-01-05 | End: 2019-01-08 | Stop reason: HOSPADM

## 2019-01-05 RX ORDER — ADHESIVE BANDAGE
30 BANDAGE TOPICAL
Status: ON HOLD | COMMUNITY
End: 2019-04-26

## 2019-01-05 RX ORDER — PANTOPRAZOLE SODIUM 20 MG/1
20 TABLET, DELAYED RELEASE ORAL
Status: DISCONTINUED | OUTPATIENT
Start: 2019-01-06 | End: 2019-01-05

## 2019-01-05 RX ORDER — PANTOPRAZOLE SODIUM 40 MG/1
40 TABLET, DELAYED RELEASE ORAL
Status: DISCONTINUED | OUTPATIENT
Start: 2019-01-06 | End: 2019-01-08 | Stop reason: HOSPADM

## 2019-01-05 RX ORDER — PREDNISONE 10 MG/1
10 TABLET ORAL 2 TIMES DAILY
COMMUNITY
End: 2019-05-24

## 2019-01-05 RX ORDER — POTASSIUM CHLORIDE 750 MG/1
10 TABLET, FILM COATED, EXTENDED RELEASE ORAL 2 TIMES DAILY WITH MEALS
COMMUNITY
End: 2019-05-24

## 2019-01-05 RX ORDER — CALCITONIN SALMON 200 [IU]/.09ML
1 SPRAY, METERED NASAL DAILY
Status: ON HOLD | COMMUNITY
End: 2019-04-26

## 2019-01-05 RX ORDER — LOPERAMIDE HYDROCHLORIDE 2 MG/1
2 CAPSULE ORAL
COMMUNITY
End: 2019-05-24

## 2019-01-05 RX ORDER — METOPROLOL TARTRATE 25 MG/1
25 TABLET, FILM COATED ORAL 2 TIMES DAILY
Status: DISCONTINUED | OUTPATIENT
Start: 2019-01-05 | End: 2019-01-08 | Stop reason: HOSPADM

## 2019-01-05 RX ORDER — MIRTAZAPINE 15 MG/1
15 TABLET, FILM COATED ORAL
Status: DISCONTINUED | OUTPATIENT
Start: 2019-01-05 | End: 2019-01-08 | Stop reason: HOSPADM

## 2019-01-05 RX ORDER — NALOXONE HYDROCHLORIDE 0.4 MG/ML
0.4 INJECTION, SOLUTION INTRAMUSCULAR; INTRAVENOUS; SUBCUTANEOUS AS NEEDED
Status: DISCONTINUED | OUTPATIENT
Start: 2019-01-05 | End: 2019-01-08 | Stop reason: HOSPADM

## 2019-01-05 RX ORDER — SODIUM CHLORIDE 0.9 % (FLUSH) 0.9 %
5-10 SYRINGE (ML) INJECTION EVERY 8 HOURS
Status: DISCONTINUED | OUTPATIENT
Start: 2019-01-05 | End: 2019-01-08 | Stop reason: HOSPADM

## 2019-01-05 RX ORDER — ENOXAPARIN SODIUM 100 MG/ML
40 INJECTION SUBCUTANEOUS EVERY 24 HOURS
Status: DISCONTINUED | OUTPATIENT
Start: 2019-01-05 | End: 2019-01-07

## 2019-01-05 RX ADMIN — SODIUM CHLORIDE 1000 ML: 900 INJECTION, SOLUTION INTRAVENOUS at 19:12

## 2019-01-05 RX ADMIN — SODIUM CHLORIDE, SODIUM LACTATE, POTASSIUM CHLORIDE, AND CALCIUM CHLORIDE 125 ML/HR: 600; 310; 30; 20 INJECTION, SOLUTION INTRAVENOUS at 22:19

## 2019-01-05 RX ADMIN — MIRTAZAPINE 15 MG: 15 TABLET, FILM COATED ORAL at 23:03

## 2019-01-05 RX ADMIN — METOPROLOL TARTRATE 25 MG: 25 TABLET ORAL at 23:03

## 2019-01-05 RX ADMIN — ONDANSETRON 4 MG: 2 INJECTION INTRAMUSCULAR; INTRAVENOUS at 19:14

## 2019-01-05 RX ADMIN — IOPAMIDOL 100 ML: 755 INJECTION, SOLUTION INTRAVENOUS at 19:44

## 2019-01-05 RX ADMIN — ENOXAPARIN SODIUM 40 MG: 40 INJECTION SUBCUTANEOUS at 22:19

## 2019-01-05 RX ADMIN — Medication 10 ML: at 22:20

## 2019-01-05 NOTE — ED PROVIDER NOTES
72 y.o. female with past medical history significant for DVT, HTN, CAD, NSTEMI, sleep apnea, DM, atrial fibrillation, fibromyalgia, and asthma who presents from EMS with chief complaint of diarrhea. Pt reports diarrhea, which began at approximately 2230 last night. Pt states she took imodium last night. Pt also c/o N/V and RLQ abdominal pain since 2300 last night. Pt notes her last episode of vomiting and diarrhea was at 0500 this morning. Pt notes she is able to tolerate ginger ale. Pt denies blood in stool. There are no other acute medical concerns at this time. PCP: Vikas Steven MD 
 
Note written by Sandra Leung, as dictated by Ruby Mattson MD 6:40 PM  
 
 
 
The history is provided by the patient and the EMS personnel. Past Medical History:  
Diagnosis Date  Asthma  Atrial fibrillation ([address] 11/16/2018  Autoimmune disease ([address]   
 fibromyalgia  CAD (coronary artery disease) 10/9/2015  Closed wedge compression fracture of T7 vertebra ([address] 11/13/2018  Diabetes ([address]  DVT (deep vein thrombosis) in pregnancy ([address]  GERD (gastroesophageal reflux disease)  HTN (hypertension)  NSTEMI (non-ST elevated myocardial infarction) ([address] 10/9/2015  Obesity (BMI 30-39.9) 11/13/2018  Sleep apnea   
 wears CPAP Past Surgical History:  
Procedure Laterality Date  ABDOMEN SURGERY PROC UNLISTED    
 hital hernia repair, gall bladder removed  BREAST SURGERY PROCEDURE UNLISTED    
 lumpectomy  CARDIAC SURG PROCEDURE UNLIST  10/9/15  
 2 stents [place]  HX COLOSTOMY    
 and reversal, post episiotomy repair [address]  HX UROLOGICAL  2009  
 bladder sling Family History:  
Problem Relation Age of Onset  Diabetes Mother  Heart Failure Mother  Kidney Disease Mother  Diabetes Father  Heart Disease Father  Elevated Lipids Father  Heart Failure Father  Heart Disease Brother  Cancer Brother   
     kidney  Diabetes Brother  No Known Problems Brother  No Known Problems Brother Social History Socioeconomic History  Marital status:  Spouse name: Not on file  Number of children: Not on file  Years of education: Not on file  Highest education level: Not on file Social Needs  Financial resource strain: Not on file  Food insecurity - worry: Not on file  Food insecurity - inability: Not on file  Transportation needs - medical: Not on file  Transportation needs - non-medical: Not on file Occupational History  Not on file Tobacco Use  Smoking status: Former Smoker Last attempt to quit: 3/30/2017 Years since quittin.7  Smokeless tobacco: Never Used Substance and Sexual Activity  Alcohol use: No  
  Alcohol/week: 0.0 oz  
  Comment: very very rarely  Drug use: No  
 Sexual activity: No  
Other Topics Concern  Not on file Social History Narrative  Not on file ALLERGIES: Advair diskus [fluticasone-salmeterol]; Aspartame; Ciprofloxacin; Statins-hmg-coa reductase inhibitors; Sulfa (sulfonamide antibiotics); Tetracycline; and Zetia [ezetimibe] Review of Systems Constitutional: Negative for activity change, appetite change, chills and fever. HENT: Negative for trouble swallowing. Respiratory: Negative for cough, chest tightness and shortness of breath. Cardiovascular: Negative for chest pain, palpitations and leg swelling. Gastrointestinal: Positive for abdominal pain, diarrhea, nausea and vomiting. Negative for blood in stool. Genitourinary: Negative for dysuria. Skin: Negative for rash. All other systems reviewed and are negative. There were no vitals filed for this visit. Physical Exam  
Constitutional: She is oriented to person, place, and time. She appears well-developed and well-nourished. No distress. BIBEMS,  AxOx4, speaking in complete sentences M obese HENT:  
Head: Normocephalic and atraumatic. Right Ear: External ear normal.  
Left Ear: External ear normal.  
Nose: Nose normal.  
Mouth/Throat: Oropharynx is clear and moist. No oropharyngeal exudate. Eyes: Conjunctivae and EOM are normal. Pupils are equal, round, and reactive to light. Right eye exhibits no discharge. Left eye exhibits no discharge. No scleral icterus. Neck: Normal range of motion. Neck supple. No JVD present. No tracheal deviation present. Cardiovascular: Regular rhythm, normal heart sounds and intact distal pulses. Exam reveals no gallop and no friction rub. No murmur heard. tachycardic Pulmonary/Chest: Effort normal and breath sounds normal. No respiratory distress. She has no wheezes. She has no rales. She exhibits no tenderness. Abdominal: Soft. Normal appearance, normal aorta and bowel sounds are normal. There is no tenderness. There is no rebound and no guarding. Distended, nttp Genitourinary: No vaginal discharge found. Genitourinary Comments: Pt denies urinary/ vaginal complaints Musculoskeletal: Normal range of motion. She exhibits no edema or tenderness. Neurological: She is alert and oriented to person, place, and time. No cranial nerve deficit. She exhibits normal muscle tone. Coordination normal.  
pt has motor/ CV/ Sensation grossly intact to all extremities, R = L in strength;  
Skin: Skin is warm and dry. Capillary refill takes less than 2 seconds. No rash noted. No erythema. No pallor. Psychiatric: She has a normal mood and affect. Her behavior is normal. Thought content normal.  
Nursing note and vitals reviewed. MDM Procedures 8:27 PM 
Pt told of results/ agrees w/ plans; will consult general surgery/ plan to speak with the hospitalist; Pt adds 'had an obstruction with Dr Gutierrez Galdamez at [place] 2 years ago';  
 
CONSULT  NOTE 
8:36 PM 
Jan Almaraz MD spoke with Dr Jodie Mas. Specialty: general surgery Discussed pt's hx, disposition, and available diagnostic and imaging results. Reviewed care plans. Consulting physician agrees with plans as outlined 'please have the hospitalist admit/ I will see here when I finish surgery at [address] [place] [address]';  
Written by Kateryna Martinez MD, ED Scribe as dictated by Katherine Mcmanus MD. 
 
8:37 PM 
Patient is being evaluated for admission to the hospital by the hospitalist, Dr Reagan Escobedo . The results of their tests and reasons for their admission have been discussed with them and/or available family. They convey agreement and understanding for the need to be admitted and for their admission diagnosis. Consultation has been made with the inpatient physician specialist for hospitalization.

## 2019-01-06 ENCOUNTER — APPOINTMENT (OUTPATIENT)
Dept: GENERAL RADIOLOGY | Age: 74
DRG: 390 | End: 2019-01-06
Attending: FAMILY MEDICINE
Payer: MEDICARE

## 2019-01-06 LAB
ALBUMIN SERPL-MCNC: 2.3 G/DL (ref 3.5–5)
ALBUMIN/GLOB SERPL: 0.8 {RATIO} (ref 1.1–2.2)
ALP SERPL-CCNC: 158 U/L (ref 45–117)
ALT SERPL-CCNC: 74 U/L (ref 12–78)
ANION GAP SERPL CALC-SCNC: 13 MMOL/L (ref 5–15)
APPEARANCE UR: ABNORMAL
AST SERPL-CCNC: 65 U/L (ref 15–37)
BACTERIA URNS QL MICRO: ABNORMAL /HPF
BASOPHILS # BLD: 0.1 K/UL (ref 0–0.1)
BASOPHILS NFR BLD: 1 % (ref 0–1)
BILIRUB DIRECT SERPL-MCNC: 0.2 MG/DL (ref 0–0.2)
BILIRUB SERPL-MCNC: 0.7 MG/DL (ref 0.2–1)
BILIRUB UR QL: NEGATIVE
BUN SERPL-MCNC: 10 MG/DL (ref 6–20)
BUN/CREAT SERPL: 10 (ref 12–20)
CALCIUM SERPL-MCNC: 8.1 MG/DL (ref 8.5–10.1)
CHLORIDE SERPL-SCNC: 110 MMOL/L (ref 97–108)
CO2 SERPL-SCNC: 19 MMOL/L (ref 21–32)
COLOR UR: ABNORMAL
CREAT SERPL-MCNC: 0.97 MG/DL (ref 0.55–1.02)
DIFFERENTIAL METHOD BLD: ABNORMAL
EOSINOPHIL # BLD: 0.3 K/UL (ref 0–0.4)
EOSINOPHIL NFR BLD: 4 % (ref 0–7)
EPITH CASTS URNS QL MICRO: ABNORMAL /LPF
ERYTHROCYTE [DISTWIDTH] IN BLOOD BY AUTOMATED COUNT: 16.6 % (ref 11.5–14.5)
GLOBULIN SER CALC-MCNC: 3 G/DL (ref 2–4)
GLUCOSE BLD STRIP.AUTO-MCNC: 107 MG/DL (ref 65–100)
GLUCOSE BLD STRIP.AUTO-MCNC: 123 MG/DL (ref 65–100)
GLUCOSE BLD STRIP.AUTO-MCNC: 128 MG/DL (ref 65–100)
GLUCOSE BLD STRIP.AUTO-MCNC: 133 MG/DL (ref 65–100)
GLUCOSE SERPL-MCNC: 100 MG/DL (ref 65–100)
GLUCOSE UR STRIP.AUTO-MCNC: NEGATIVE MG/DL
HCT VFR BLD AUTO: 33.9 % (ref 35–47)
HGB BLD-MCNC: 10.4 G/DL (ref 11.5–16)
HGB UR QL STRIP: ABNORMAL
IMM GRANULOCYTES # BLD: 0.1 K/UL (ref 0–0.04)
IMM GRANULOCYTES NFR BLD AUTO: 1 % (ref 0–0.5)
KETONES UR QL STRIP.AUTO: NEGATIVE MG/DL
LEUKOCYTE ESTERASE UR QL STRIP.AUTO: ABNORMAL
LYMPHOCYTES # BLD: 2 K/UL (ref 0.8–3.5)
LYMPHOCYTES NFR BLD: 26 % (ref 12–49)
MAGNESIUM SERPL-MCNC: 1.2 MG/DL (ref 1.6–2.4)
MCH RBC QN AUTO: 30.6 PG (ref 26–34)
MCHC RBC AUTO-ENTMCNC: 30.7 G/DL (ref 30–36.5)
MCV RBC AUTO: 99.7 FL (ref 80–99)
MONOCYTES # BLD: 0.9 K/UL (ref 0–1)
MONOCYTES NFR BLD: 12 % (ref 5–13)
NEUTS SEG # BLD: 4.4 K/UL (ref 1.8–8)
NEUTS SEG NFR BLD: 57 % (ref 32–75)
NITRITE UR QL STRIP.AUTO: POSITIVE
NRBC # BLD: 0.02 K/UL (ref 0–0.01)
NRBC BLD-RTO: 0.3 PER 100 WBC
PH UR STRIP: 5 [PH] (ref 5–8)
PHOSPHATE SERPL-MCNC: 3.8 MG/DL (ref 2.6–4.7)
PLATELET # BLD AUTO: 296 K/UL (ref 150–400)
PMV BLD AUTO: 9.8 FL (ref 8.9–12.9)
POTASSIUM SERPL-SCNC: 3 MMOL/L (ref 3.5–5.1)
PROT SERPL-MCNC: 5.3 G/DL (ref 6.4–8.2)
PROT UR STRIP-MCNC: ABNORMAL MG/DL
RBC # BLD AUTO: 3.4 M/UL (ref 3.8–5.2)
RBC #/AREA URNS HPF: ABNORMAL /HPF (ref 0–5)
SERVICE CMNT-IMP: ABNORMAL
SODIUM SERPL-SCNC: 142 MMOL/L (ref 136–145)
SP GR UR REFRACTOMETRY: 1.01 (ref 1–1.03)
UR CULT HOLD, URHOLD: NORMAL
UROBILINOGEN UR QL STRIP.AUTO: 0.2 EU/DL (ref 0.2–1)
WBC # BLD AUTO: 7.7 K/UL (ref 3.6–11)
WBC URNS QL MICRO: >100 /HPF (ref 0–4)

## 2019-01-06 PROCEDURE — 81001 URINALYSIS AUTO W/SCOPE: CPT

## 2019-01-06 PROCEDURE — 74011250636 HC RX REV CODE- 250/636: Performed by: INTERNAL MEDICINE

## 2019-01-06 PROCEDURE — 74011250636 HC RX REV CODE- 250/636: Performed by: FAMILY MEDICINE

## 2019-01-06 PROCEDURE — 74019 RADEX ABDOMEN 2 VIEWS: CPT

## 2019-01-06 PROCEDURE — 99218 HC RM OBSERVATION: CPT

## 2019-01-06 PROCEDURE — 83735 ASSAY OF MAGNESIUM: CPT

## 2019-01-06 PROCEDURE — 85025 COMPLETE CBC W/AUTO DIFF WBC: CPT

## 2019-01-06 PROCEDURE — 82962 GLUCOSE BLOOD TEST: CPT

## 2019-01-06 PROCEDURE — 36415 COLL VENOUS BLD VENIPUNCTURE: CPT

## 2019-01-06 PROCEDURE — 94760 N-INVAS EAR/PLS OXIMETRY 1: CPT

## 2019-01-06 PROCEDURE — 80048 BASIC METABOLIC PNL TOTAL CA: CPT

## 2019-01-06 PROCEDURE — 97530 THERAPEUTIC ACTIVITIES: CPT | Performed by: PHYSICAL THERAPIST

## 2019-01-06 PROCEDURE — 84100 ASSAY OF PHOSPHORUS: CPT

## 2019-01-06 PROCEDURE — 74011250636 HC RX REV CODE- 250/636: Performed by: SURGERY

## 2019-01-06 PROCEDURE — 77030038269 HC DRN EXT URIN PURWCK BARD -A

## 2019-01-06 PROCEDURE — 51798 US URINE CAPACITY MEASURE: CPT

## 2019-01-06 PROCEDURE — 80076 HEPATIC FUNCTION PANEL: CPT

## 2019-01-06 PROCEDURE — 74011250637 HC RX REV CODE- 250/637: Performed by: INTERNAL MEDICINE

## 2019-01-06 PROCEDURE — 97162 PT EVAL MOD COMPLEX 30 MIN: CPT | Performed by: PHYSICAL THERAPIST

## 2019-01-06 RX ORDER — MAGNESIUM SULFATE HEPTAHYDRATE 40 MG/ML
2 INJECTION, SOLUTION INTRAVENOUS
Status: COMPLETED | OUTPATIENT
Start: 2019-01-06 | End: 2019-01-06

## 2019-01-06 RX ORDER — POTASSIUM CHLORIDE 7.45 MG/ML
10 INJECTION INTRAVENOUS
Status: DISCONTINUED | OUTPATIENT
Start: 2019-01-06 | End: 2019-01-06

## 2019-01-06 RX ORDER — POTASSIUM CHLORIDE 7.45 MG/ML
10 INJECTION INTRAVENOUS EVERY 4 HOURS
Status: COMPLETED | OUTPATIENT
Start: 2019-01-06 | End: 2019-01-07

## 2019-01-06 RX ORDER — SODIUM CHLORIDE, SODIUM LACTATE, POTASSIUM CHLORIDE, CALCIUM CHLORIDE 600; 310; 30; 20 MG/100ML; MG/100ML; MG/100ML; MG/100ML
50 INJECTION, SOLUTION INTRAVENOUS CONTINUOUS
Status: DISCONTINUED | OUTPATIENT
Start: 2019-01-06 | End: 2019-01-07

## 2019-01-06 RX ORDER — MAGNESIUM SULFATE HEPTAHYDRATE 40 MG/ML
2 INJECTION, SOLUTION INTRAVENOUS
Status: DISCONTINUED | OUTPATIENT
Start: 2019-01-06 | End: 2019-01-06

## 2019-01-06 RX ORDER — MAGNESIUM SULFATE HEPTAHYDRATE 40 MG/ML
4 INJECTION, SOLUTION INTRAVENOUS ONCE
Status: DISCONTINUED | OUTPATIENT
Start: 2019-01-06 | End: 2019-01-06

## 2019-01-06 RX ADMIN — METOPROLOL TARTRATE 25 MG: 25 TABLET ORAL at 22:52

## 2019-01-06 RX ADMIN — POTASSIUM CHLORIDE 10 MEQ: 10 INJECTION, SOLUTION INTRAVENOUS at 16:02

## 2019-01-06 RX ADMIN — POTASSIUM CHLORIDE 10 MEQ: 10 INJECTION, SOLUTION INTRAVENOUS at 09:00

## 2019-01-06 RX ADMIN — Medication 10 ML: at 22:56

## 2019-01-06 RX ADMIN — MAGNESIUM SULFATE HEPTAHYDRATE 2 G: 40 INJECTION, SOLUTION INTRAVENOUS at 10:00

## 2019-01-06 RX ADMIN — SODIUM CHLORIDE, SODIUM LACTATE, POTASSIUM CHLORIDE, AND CALCIUM CHLORIDE 125 ML/HR: 600; 310; 30; 20 INJECTION, SOLUTION INTRAVENOUS at 06:36

## 2019-01-06 RX ADMIN — METOPROLOL TARTRATE 25 MG: 25 TABLET ORAL at 08:36

## 2019-01-06 RX ADMIN — MAGNESIUM SULFATE HEPTAHYDRATE 2 G: 40 INJECTION, SOLUTION INTRAVENOUS at 09:14

## 2019-01-06 RX ADMIN — PANTOPRAZOLE SODIUM 40 MG: 40 TABLET, DELAYED RELEASE ORAL at 17:18

## 2019-01-06 RX ADMIN — ENOXAPARIN SODIUM 40 MG: 40 INJECTION SUBCUTANEOUS at 22:52

## 2019-01-06 RX ADMIN — POTASSIUM CHLORIDE 10 MEQ: 10 INJECTION, SOLUTION INTRAVENOUS at 22:56

## 2019-01-06 RX ADMIN — MIRTAZAPINE 15 MG: 15 TABLET, FILM COATED ORAL at 22:52

## 2019-01-06 RX ADMIN — SODIUM CHLORIDE 1000 ML: 900 INJECTION, SOLUTION INTRAVENOUS at 18:08

## 2019-01-06 RX ADMIN — POTASSIUM CHLORIDE 10 MEQ: 10 INJECTION, SOLUTION INTRAVENOUS at 17:00

## 2019-01-06 NOTE — ED NOTES
4th floor charge nurse Chloé AJ called regarding room assignment received, this relayed to Too RN-ED primary nurse.

## 2019-01-06 NOTE — PROGRESS NOTES
Problem: Mobility Impaired (Adult and Pediatric) Goal: *Acute Goals and Plan of Care (Insert Text) Physical Therapy Goals Initiated 1/6/2019 1. Patient will move from supine to sit and sit to supine  in bed with moderate assistance  within 7 day(s). 2.  Patient will transfer from bed to chair and chair to bed with moderate assistance  using the least restrictive device within 7 day(s). 3.  Patient will perform sit to stand with moderate assistance  within 7 day(s). 4.  Patient will ambulate with moderate assistance  for 150 feet with the least restrictive device within 7 day(s). physical Therapy EVALUATION Patient: Mckenzie Wiggins (23 y.o. female) Date: 1/6/2019 Primary Diagnosis: Partial small bowel obstruction (HCC) Precautions:   Fall, Skin ASSESSMENT : 
Based on the objective data described below, the patient presents with high fall risk c/w symptomatic orthostatic BP during sit to stand activities. ( 120/70--->98/66 with HR 98--->102 bpm). Pt reports independent living alone [facility] independent living x 1 yr, however, most recently PTA participating in rehab at [facility] for the past 4 weeks. Pt expressed that goal is to return to rehab at that location. She reports OOB to bathroom last evening with nursing, however, today requires mod--> max assist to transition from supine--sit EOB--> stand using RW. Pt with immediate symptomatic L/D ( 8/10) with standing, but able to lateral step 4 steps to [facility] prior to need to sit. BP recovery once supine. Denies exacerbation of pain, and required mod A to assume supine position as previously. Pt admits to minimal hydration with frequent sleep pds today, thus encouraged water sips while sitting at EOB. Anticipates return to rehab, but currently below functional baseline. ( she reports that Friday in rehab she walked 120 ft, 80 ft with RW). Patient will benefit from skilled intervention to address the above impairments. Patients rehabilitation potential is considered to be Fair Factors which may influence rehabilitation potential include:  
[]         None noted 
[]         Mental ability/status [x]         Medical condition 
[x]         Home/family situation and support systems 
[]         Safety awareness 
[]         Pain tolerance/management 
[x]         Other: L/D  
 
PLAN : 
Recommendations and Planned Interventions: 
[x]           Bed Mobility Training             []    Neuromuscular Re-Education 
[x]           Transfer Training                   []    Orthotic/Prosthetic Training 
[x]           Gait Training                         []    Modalities [x]           Therapeutic Exercises           []    Edema Management/Control 
[x]           Therapeutic Activities            []    Patient and Family Training/Education 
[]           Other (comment): Frequency/Duration: Patient will be followed by physical therapy  5 times a week to address goals. Discharge Recommendations: Rehab Further Equipment Recommendations for Discharge: None new this admission SUBJECTIVE:  
Patient stated I'm going back to rehab. I didn't know that this is necessary for that to happen? Ike Chavez OBJECTIVE DATA SUMMARY:  
HISTORY:   
Past Medical History:  
Diagnosis Date  Asthma  Atrial fibrillation ([facility] 11/16/2018  Autoimmune disease ([facility]   
 fibromyalgia  CAD (coronary artery disease) 10/9/2015  Closed wedge compression fracture of T7 vertebra [facility] 11/13/2018  Diabetes [facility]  DVT (deep vein thrombosis) in pregnancy ([facility]  GERD (gastroesophageal reflux disease)  HTN (hypertension)  NSTEMI (non-ST elevated myocardial infarction) ([facility] 10/9/2015  Obesity (BMI 30-39.9) 11/13/2018  Sleep apnea   
 wears CPAP Past Surgical History:  
Procedure Laterality Date  ABDOMEN SURGERY PROC UNLISTED    
 hital hernia repair, gall bladder removed  BREAST SURGERY PROCEDURE UNLISTED    
 lumpectomy  CARDIAC SURG PROCEDURE UNLIST  10/9/15  
 2 stents [address]  HX COLOSTOMY    
 and reversal, post episiotomy repair [address]  HX UROLOGICAL  2009  
 bladder sling Prior Level of Function/Home Situation: Pt admitted from [facility] rehab unit, but prior independent living on the campus of [facility] Personal factors and/or comorbidities impacting plan of care: Mobility limited today by symptomatic L/D c/w with orthostatic BP response. Home Situation Home Environment: Independent living Care [facility]: [facility] # Steps to Enter: 0 One/Two Story Residence: One story Living Alone: Yes Support Systems: Child(helio) Patient Expects to be Discharged to[de-identified] Assisted living Current DME Used/Available at Home: Kristin De La Torre, rollator, Christal Jointer, straight, CPAP, Wheelchair, Walker, rolling Tub or Shower Type: Shower EXAMINATION/PRESENTATION/DECISION MAKING: Critical Behavior: 
Neurologic State: Alert, Drowsy Orientation Level: Oriented X4 Cognition: Appropriate for age attention/concentration, Appropriate safety awareness, Follows commands, Appropriate decision making Safety/Judgement: Awareness of environment, Good awareness of safety precautions, Insight into deficits Hearing: Auditory Auditory Impairment: Hard of hearing, bilateralSkin:   
Edema: non-signficant Range Of Motion: 
AROM: Generally decreased, functional 
PROM: Generally decreased, functional 
Strength:   
Strength: Generally decreased, functional 
 Tone & Sensation:  
Tone: Normal 
 Sensation: Intact Coordination: 
Coordination: Within functional limits Functional Mobility: 
Bed Mobility: 
Rolling: Moderate assistance Supine to Sit: Maximum assistance Sit to Supine: Moderate assistance Scooting: Moderate assistance Transfers: 
Sit to Stand: Minimum assistance Stand to Sit: Minimum assistance Balance:  
Sitting: Intact Standing: Intact; With supportAmbulation/Gait Training:  Deferred due to L/D Stairs: Deferred Functional Measure: 
Barthel Index: 
 
Bathin Bladder: 5 Bowels: 10 
Groomin Dressin Feeding: 10 Mobility: 0 Stairs: 0 Toilet Use: 0 Transfer (Bed to Chair and Back): 5 Total: 35 The Barthel ADL Index: Guidelines 1. The index should be used as a record of what a patient does, not as a record of what a patient could do. 2. The main aim is to establish degree of independence from any help, physical or verbal, however minor and for whatever reason. 3. The need for supervision renders the patient not independent. 4. A patient's performance should be established using the best available evidence. Asking the patient, friends/relatives and nurses are the usual sources, but direct observation and common sense are also important. However direct testing is not needed. 5. Usually the patient's performance over the preceding 24-48 hours is important, but occasionally longer periods will be relevant. 6. Middle categories imply that the patient supplies over 50 per cent of the effort. 7. Use of aids to be independent is allowed. Helga Gonsales., Barthel, D.W. ([id number]). Functional evaluation: the Barthel Index. [facility] (14)2. Gutierrez Kirkland belen Rosemary, MAMIEJJosiahMELIZABETH, Janett Garcia., Feroz Samuels., [place], [address] [place] [address] (). Measuring the change indisability after inpatient rehabilitation; comparison of the responsiveness of the Barthel Index and Functional [place] Measure. Journal of Neurology, Neurosurgery, and Psychiatry, 66(4), 385-535. Emerita Clark, [place].ELIZ, SANIYA Parry, & Moy Oakes MJosiahA. (2004.) Assessment of post-stroke quality of life in cost-effectiveness studies: The usefulness of the Barthel Index and the EuroQoL-5D. [facility], 13, 653-65 Physical Therapy Evaluation Charge Determination History Examination Presentation Decision-Making MEDIUM  Complexity : 1-2 comorbidities / personal factors will impact the outcome/ POC  LOW Complexity : 1-2 Standardized tests and measures addressing body structure, function, activity limitation and / or participation in recreation  MEDIUM Complexity : Evolving with changing characteristics  HIGH Complexity : FOTO score of 1- 25 Barthel Index score 35 Based on the above components, the patient evaluation is determined to be of the following complexity level: HIGH Pain: 
Pain Scale 1: Numeric (0 - 10) Pain Intensity 1: 0 Activity Tolerance:  
Poor tolerance to standing activity today due to L/D. Encouraged HOB/sitting today for meals Please refer to the flowsheet for vital signs taken during this treatment. After treatment:  
[]         Patient left in no apparent distress sitting up in chair 
[x]         Patient left in no apparent distress in bed 
[x]         Call bell left within reach [x]         Nursing notified 
[]         Caregiver present 
[]         Bed alarm activated COMMUNICATION/EDUCATION:  
The patients plan of care was discussed with: Registered Nurse. [x]         Fall prevention education was provided and the patient/caregiver indicated understanding. [x]         Patient/family have participated as able in goal setting and plan of care. [x]         Patient/family agree to work toward stated goals and plan of care. []         Patient understands intent and goals of therapy, but is neutral about his/her participation. []         Patient is unable to participate in goal setting and plan of care. Thank you for this referral. 
Aure Flores, PT Time Calculation: 30 mins

## 2019-01-06 NOTE — PROGRESS NOTES
Pts chart reviewed. Discusssed with ER provider. With history of nausea/vomiting and diarrhea suspect this is a gastroenteritis. CT appearance is not concerning for an acute surgical process and is compatible with gastroenteritis. Will see and evaluate the patient with full consult to follow. Pt is being admitted by the hospitalist service for management of multiple medical issues. Ok for clear liquids as tolerates

## 2019-01-06 NOTE — PROGRESS NOTES
Physical Exam  
Skin: Skin is warm and dry. Primary Nurse Kimberly Sanders RN and Janiya Seo RN performed a dual skin assessment on this patient Impairment noted- see wound doc flow sheet Darrion score is 16

## 2019-01-06 NOTE — PROGRESS NOTES
SURGERY PROGRESS NOTE Admit Date: 2019 POD * No surgery found * Procedure: * No surgery found * Subjective:  
 
Feels much better this am 
Pain and nausea resolved Objective:  
 
Visit Vitals /59 (BP 1 Location: Left arm, BP Patient Position: At rest;Supine) Pulse (!) 110 Temp 98.1 °F (36.7 °C) Resp 18 Ht 5' 6\" (1.676 m) Wt 99.3 kg (219 lb) SpO2 94% BMI 35.35 kg/m² Temp (24hrs), Av.1 °F (36.7 °C), Min:97.7 °F (36.5 °C), Max:98.4 °F (36.9 °C) Physical Exam: Abdomen:  Soft. Non-tender, non-distended. Lab Results Component Value Date/Time WBC 7.7 2019 04:36 AM  
 HGB 10.4 (L) 2019 04:36 AM  
 Hemoglobin (POC) 13.9 10/09/2015 12:27 PM  
 HCT 33.9 (L) 2019 04:36 AM  
 Hematocrit (POC) 31 (L) 2018 02:00 PM  
 PLATELET 475  04:36 AM  
 MCV 99.7 (H) 2019 04:36 AM  
 
Lab Results Component Value Date/Time GFR est non-AA 57 (L) 2019 04:36 AM  
 GFRNA, POC 44 (L) 10/12/2018 04:14 PM  
 GFR est AA >60 2019 04:36 AM  
 GFRAA, POC 54 (L) 10/12/2018 04:14 PM  
 Creatinine 0.97 2019 04:36 AM  
 Creatinine (POC) 1.2 10/12/2018 04:14 PM  
 BUN 10 2019 04:36 AM  
 BUN (POC) 23 (H) 2018 02:00 PM  
 Sodium 142 2019 04:36 AM  
 Sodium (POC) 137 2018 02:00 PM  
 Potassium 3.0 (L) 2019 04:36 AM  
 Potassium (POC) 3.9 2018 02:00 PM  
 Chloride 110 (H) 2019 04:36 AM  
 Chloride (POC) 104 2018 02:00 PM  
 CO2 19 (L) 2019 04:36 AM  
 Magnesium 1.2 (L) 2019 04:36 AM  
 Phosphorus 3.8 2019 04:36 AM  
 
 
Assessment:  
 
Principal Problem: 
  Partial small bowel obstruction ([facility] (2019) Active Problems: 
  Coronary artery disease involving native coronary artery without angina pectoris (2016) Essential hypertension (2016) Recurrent deep vein thrombosis (DVT) ([facility] (3/30/2018) Type 2 diabetes with nephropathy ([facility] (10/1/2018) Obesity (BMI 30-39.9) (11/13/2018) Sleep apnea (1/5/2019) Overview: wears CPAP Atrial fibrillation ([facility] (11/16/2018) Plan/Recommendations/Medical Decision Making:  
Clears this am - advance as tolerates OOB as able Once tolerates regular diet OK for d/c home

## 2019-01-06 NOTE — CONSULTS
Surgery Consult: 
 
 
Subjective:  
Patient 72 y.o.  female presents with abdominal pain nausea vomiting and diarrhea. The symptoms began yesterday and became progressively worse over the course of the day. She had multiple episodes of diarrhea. She reports diffuse abdominal pain. She denies any fevers or chills. She reports that currently she is feeling well. She denies any further nausea. She denies any previous history of bowel obstruction. She had had a small bowel resection for ischemia several years ago. She denies any problems since the time of surgery. Past Medical & Surgical History: 
Past Medical History:  
Diagnosis Date  Asthma  Atrial fibrillation ([facility] 11/16/2018  Autoimmune disease ([facility]   
 fibromyalgia  CAD (coronary artery disease) 10/9/2015  Closed wedge compression fracture of T7 vertebra ([facility] 11/13/2018  Diabetes ([facility]  DVT (deep vein thrombosis) in pregnancy ([facility]  GERD (gastroesophageal reflux disease)  HTN (hypertension)  NSTEMI (non-ST elevated myocardial infarction) ([facility] 10/9/2015  Obesity (BMI 30-39.9) 11/13/2018  Sleep apnea   
 wears CPAP Past Surgical History:  
Procedure Laterality Date  ABDOMEN SURGERY PROC UNLISTED    
 hital hernia repair, gall bladder removed  BREAST SURGERY PROCEDURE UNLISTED    
 lumpectomy  CARDIAC SURG PROCEDURE UNLIST  10/9/15  
 2 stents [address] [facility]  HX COLOSTOMY    
 and reversal, post episiotomy repair [address]  HX UROLOGICAL  2009  
 bladder sling Social History: 
Social History Socioeconomic History  Marital status:  Spouse name: Not on file  Number of children: Not on file  Years of education: Not on file  Highest education level: Not on file Social Needs  Financial resource strain: Not on file  Food insecurity - worry: Not on file  Food insecurity - inability: Not on file  Transportation needs - medical: Not on file  Transportation needs - non-medical: Not on file Occupational History  Not on file Tobacco Use  Smoking status: Former Smoker Last attempt to quit: 3/30/2017 Years since quittin.7  Smokeless tobacco: Never Used Substance and Sexual Activity  Alcohol use: No  
  Alcohol/week: 0.0 oz  
  Comment: very very rarely  Drug use: No  
 Sexual activity: No  
Other Topics Concern  Not on file Social History Narrative  Not on file Family History: 
Family History Problem Relation Age of Onset  Diabetes Mother  Heart Failure Mother  Kidney Disease Mother  Diabetes Father  Heart Disease Father  Elevated Lipids Father  Heart Failure Father  Heart Disease Brother  Cancer Brother   
     kidney  Diabetes Brother  No Known Problems Brother  No Known Problems Brother Prior to Admission Medications: 
Prior to Admission Medications Prescriptions Last Dose Informant Patient Reported? Taking? DULoxetine (CYMBALTA) 60 mg capsule 2019 at Unknown time Transfer Papers Yes Yes Sig: Take 60 mg by mouth daily. acetaminophen (TYLENOL) 325 mg tablet  Transfer Papers Yes Yes Sig: Take 650 mg by mouth every six (6) hours as needed for Pain. albuterol (PROAIR HFA) 90 mcg/actuation inhaler  Transfer Papers Yes Yes Sig: Take 1 Puff by inhalation every four (4) hours as needed. albuterol (PROVENTIL VENTOLIN) 2.5 mg /3 mL (0.083 %) nebulizer solution  Transfer Papers Yes Yes Si.5 mg by Nebulization route every six (6) hours as needed for Wheezing or Shortness of Breath. azelastine-fluticasone (DYMISTA) 137-50 mcg/spray spry 2019 at PM Transfer Papers Yes Yes Si Spray by Nasal route two (2) times a day. biotin 10,000 mcg cap 2019 at Unknown time Transfer Papers Yes Yes Sig: Take 1 Cap by mouth daily. bisacodyl (DULCOLAX) 10 mg suppository  Transfer Papers Yes Yes Sig: Insert 10 mg into rectum daily as needed (constipation). calcitonin, salmon, (MIACALCIN) nasal 2019 at Unknown time Transfer Papers Yes Yes Si [place] by IntraNASal route daily. Alternating nostril  
cholecalciferol (VITAMIN D3) 1,000 unit tablet 2019 at Unknown time Transfer Papers Yes Yes Sig: Take 1,000 Units by mouth daily. clopidogrel (PLAVIX) 75 mg tab 2019 at Unknown time Transfer Papers Yes Yes Sig: Take 75 mg by mouth daily. lactobacillus rhamnosus gg 10 billion cell (CULTURELLE) 10 billion cell capsule 2019 at Unknown time Transfer Papers Yes Yes Sig: Take 1 Cap by mouth daily. loperamide (IMODIUM) 2 mg capsule 2019 at Unknown time Transfer Papers Yes Yes Sig: Take 2 mg by mouth daily as needed for Diarrhea.  
magnesium hydroxide (CLARK MILK OF MAGNESIA) 400 mg/5 mL suspension  Transfer Papers Yes Yes Sig: Take 30 mL by mouth two (2) times daily as needed for Constipation. magnesium oxide (MAG-OX) 400 mg tablet 2019 at Unknown time Transfer Papers No Yes Sig: Take 1 Tab by mouth daily. metoprolol tartrate (LOPRESSOR) 25 mg tablet 2019 at PM Transfer Papers Yes Yes Sig: Take 25 mg by mouth two (2) times a day. mirtazapine (REMERON) 15 mg tablet 2019 at Unknown time Transfer Papers Yes Yes Sig: Take 15 mg by mouth nightly. mometasone-formoterol (DULERA) 200-5 mcg/actuation HFA inhaler 2019 at PM Transfer Papers Yes Yes Sig: Take 2 Puffs by inhalation two (2) times a day. montelukast (SINGULAIR) 10 mg tablet 2019 at PM Transfer Papers Yes Yes Sig: Take 10 mg by mouth nightly. ondansetron (ZOFRAN ODT) 4 mg disintegrating tablet 2019 at Unknown time Transfer Papers No Yes Sig: Take 1 Tab by mouth every eight (8) hours as needed. pantoprazole (PROTONIX) 20 mg tablet 2019 at Unknown time Transfer Papers Yes Yes Sig: Take 20 mg by mouth Daily (before dinner). polyethylene glycol (MIRALAX) 17 gram packet  Transfer Papers Yes Yes Sig: Take 17 g by mouth daily as needed. potassium chloride SR (KLOR-CON 10) 10 mEq tablet 1/4/2019 at PM Transfer Papers Yes Yes Sig: Take 10 mEq by mouth two (2) times daily (with meals). predniSONE (DELTASONE) 10 mg tablet 1/4/2019 at Unknown time Transfer Papers Yes Yes Sig: Take 10 mg by mouth daily. psyllium (METAMUCIL) packet 1/4/2019 at Unknown time Transfer Papers Yes Yes Sig: Take 1 Packet by mouth daily. rivaroxaban (XARELTO) 20 mg tab tablet 1/4/2019 at PM Transfer Papers No Yes Sig: Take 1 Tab by mouth daily (with dinner). saxagliptin (ONGLYZA) 5 mg tab tablet 1/4/2019 at Unknown time Transfer Papers Yes Yes Sig: Take 5 mg by mouth daily. Takes at 1630  
simethicone (MYLICON) 80 mg chewable tablet 1/5/2019 at Unknown time Transfer Papers Yes Yes Sig: Take 80 mg by mouth every six (6) hours as needed (gas). tiotropium (SPIRIVA WITH HANDIHALER) 18 mcg inhalation capsule 1/4/2019 at Unknown time Transfer Papers Yes Yes Sig: Take 1 Cap by inhalation daily. Facility-Administered Medications: None Allergies: Allergies Allergen Reactions  Advair Diskus [Fluticasone-Salmeterol] Rash  Aspartame Other (comments)  Breo Ellipta [Fluticasone-Vilanterol] Other (comments)  Ciprofloxacin Rash  Statins-Hmg-Coa Reductase Inhibitors Other (comments) Muscle pain Lipitor/crestor/zocor  Sulfa (Sulfonamide Antibiotics) Rash  Tetracycline Other (comments) musclepain  Zetia [Ezetimibe] Myalgia Review of Systems A comprehensive review of systems was negative except for that written in the HPI. Objective:  
Vitals: 
Patient Vitals for the past 8 hrs: 
 BP Temp Pulse Resp SpO2 Height Weight 01/05/19 2354 143/69 98.4 °F (36.9 °C) (!) 120 17 96 %    
01/05/19 2334   (!) 125      
01/05/19 2303 141/66  (!) [facility]   01/05/19 2143 112/66 97.9 °F (36.6 °C) (!) 122 16 98 %    
01/05/19 2058   (!) 113 15 95 %    
01/05/19 2045 113/56  (!) 120 17 98 %    
01/05/19 2000 141/66  (!) 123 17 100 %    
01/05/19 1930 136/86  (!) 126 16 96 %    
01/05/19 1916 (!) 122/96  (!) 131 23 96 %    
01/05/19 1842 124/48 97.7 °F (36.5 °C) (!) 131 26 95 % 5' 6\" (1.676 m) 99.3 kg (219 lb) Exam: 
Visit Vitals /69 (BP 1 Location: Left arm, BP Patient Position: At rest) Pulse (!) 120 Temp 98.4 °F (36.9 °C) Resp 17 Ht 5' 6\" (1.676 m) Wt 99.3 kg (219 lb) SpO2 96% BMI 35.35 kg/m² General:  Alert, cooperative, no distress, appears stated age. Head:  Normocephalic, without obvious abnormality, atraumatic. Eyes:  Conjunctivae/corneas clear. PERRL, EOMs intact. Ears:  Normal  external ear canals both ears. Nose: Nares normal. Septum midline. Mucosa normal. No drainage or sinus tenderness. Throat: Lips, mucosa, and tongue normal. Teeth and gums normal.  
Neck: Supple, symmetrical, trachea midline. Back:   Symmetric, no curvature. ROM normal. No CVA tenderness. Lungs:   Clear to auscultation bilaterally. Chest wall:  No tenderness or deformity. Heart:  Regular rate and rhythm, Abdomen:   Soft, non-tender. Bowel sounds normal. No masses,  No organomegaly. Extremities: Extremities normal, atraumatic, no cyanosis or edema. Skin: Skin color, texture, turgor normal. No rashes or lesions. Data Review Recent Results (from the past 24 hour(s)) TROPONIN I Collection Time: 01/05/19  7:06 PM  
Result Value Ref Range Troponin-I, Qt. <0.05 <0.05 ng/mL METABOLIC PANEL, COMPREHENSIVE Collection Time: 01/05/19  7:06 PM  
Result Value Ref Range Sodium 142 136 - 145 mmol/L Potassium 3.9 3.5 - 5.1 mmol/L Chloride 108 97 - 108 mmol/L  
 CO2 17 (L) 21 - 32 mmol/L Anion gap 17 (H) 5 - 15 mmol/L Glucose 145 (H) 65 - 100 mg/dL  BUN 16 6 - 20 MG/DL  
 Creatinine 1.12 (H) 0.55 - 1.02 MG/DL  
 BUN/Creatinine ratio 14 12 - 20 GFR est AA 58 (L) >60 ml/min/1.73m2 GFR est non-AA 48 (L) >60 ml/min/1.73m2 Calcium 9.3 8.5 - 10.1 MG/DL Bilirubin, total 1.0 0.2 - 1.0 MG/DL  
 ALT (SGPT) 96 (H) 12 - 78 U/L  
 AST (SGOT) 82 (H) 15 - 37 U/L Alk. phosphatase 205 (H) 45 - 117 U/L Protein, total 6.9 6.4 - 8.2 g/dL Albumin 3.0 (L) 3.5 - 5.0 g/dL Globulin 3.9 2.0 - 4.0 g/dL A-G Ratio 0.8 (L) 1.1 - 2.2    
CBC WITH AUTOMATED DIFF Collection Time: 01/05/19  7:06 PM  
Result Value Ref Range WBC 10.4 3.6 - 11.0 K/uL  
 RBC 4.25 3.80 - 5.20 M/uL  
 HGB 12.9 11.5 - 16.0 g/dL HCT 44.0 35.0 - 47.0 % .5 (H) 80.0 - 99.0 FL  
 MCH 30.4 26.0 - 34.0 PG  
 MCHC 29.3 (L) 30.0 - 36.5 g/dL  
 RDW 16.6 (H) 11.5 - 14.5 % PLATELET 124 918 - 509 K/uL MPV 9.6 8.9 - 12.9 FL  
 NRBC 0.0 0  WBC ABSOLUTE NRBC 0.00 0.00 - 0.01 K/uL NEUTROPHILS 65 32 - 75 % LYMPHOCYTES 21 12 - 49 % MONOCYTES 9 5 - 13 % EOSINOPHILS 4 0 - 7 % BASOPHILS 1 0 - 1 % IMMATURE GRANULOCYTES 1 (H) 0.0 - 0.5 % ABS. NEUTROPHILS 6.7 1.8 - 8.0 K/UL  
 ABS. LYMPHOCYTES 2.2 0.8 - 3.5 K/UL  
 ABS. MONOCYTES 1.0 0.0 - 1.0 K/UL  
 ABS. EOSINOPHILS 0.4 0.0 - 0.4 K/UL  
 ABS. BASOPHILS 0.1 0.0 - 0.1 K/UL  
 ABS. IMM. GRANS. 0.1 (H) 0.00 - 0.04 K/UL  
 DF AUTOMATED    
POC LACTIC ACID Collection Time: 01/05/19  7:08 PM  
Result Value Ref Range Lactic Acid (POC) 1.87 0.40 - 2.00 mmol/L  
GLUCOSE, POC Collection Time: 01/05/19 10:21 PM  
Result Value Ref Range Glucose (POC) 105 (H) 65 - 100 mg/dL Performed by Salima Cheung (PCT) Assessment:  
 
Principal Problem: 
  Partial small bowel obstruction ([facility] (1/5/2019) Active Problems: 
  Coronary artery disease involving native coronary artery without angina pectoris (1/22/2016) Essential hypertension (1/22/2016) Recurrent deep vein thrombosis (DVT) ([facility] (3/30/2018) Type 2 diabetes with nephropathy ([facility] (10/1/2018) Obesity (BMI 30-39.9) (11/13/2018) Sleep apnea (1/5/2019) Overview: wears CPAP Atrial fibrillation ([facility] (11/16/2018) Plan:  
 
Pts pain and symptoms improving Ok to continue clears and can advance as tolerates Likely a gastroenteritis No surgical intervention needed at this time OOB ambulate

## 2019-01-06 NOTE — PROGRESS NOTES
Patient experienced hypotensive episode with dizziness. Continued to monitor. Patient has had 500ml if IV fluid as well as 300ml by mouth fluids and no urine output within the past 5 hours. Contact Dr. Martha Chappell office for oncall. Spoke with Chepe Cabrera. Waiting for call back 1800- per dr. Martha Chappell telephone readback, NS bolus  1000ml over 2 hours. Increase LR continuous to 125ml/hr. Perform bladder scan. For Bladder over 250 ml on bladder scan, perform straight cath.

## 2019-01-06 NOTE — PROGRESS NOTES
Patient reports her HR is typically in the 90's to low 100's. Noted MEWS score 3. VS /66. Temp 97.9, O2 98%, , and RR 16. No distress noted at this time. Will cont to monitor

## 2019-01-06 NOTE — ROUTINE PROCESS
TRANSFER - OUT REPORT: 
 
Verbal report given to Antonia(name) on Macey Soto  being transferred to 4th floor(unit) for routine progression of care Report consisted of patients Situation, Background, Assessment and  
Recommendations(SBAR). Information from the following report(s) SBAR, Kardex, ED Summary, Intake/Output and Recent Results was reviewed with the receiving nurse. Lines:    
 
Opportunity for questions and clarification was provided. Patient transported with: 
 Krowder

## 2019-01-06 NOTE — PROGRESS NOTES
[facility]: MED RECONCILIATION Comments/Recommendations:  
? Patient is a resident of [facility]. ? Prior to Admission medication list completed using transfer papers from [facility]. ? Per patient interview, the only medications she received today were Loperamide, Ondansetron, and Simethicone. Information obtained from: transfer papers from [facility]. Allergies: Advair diskus [fluticasone-salmeterol]; Aspartame; Breo ellipta [fluticasone-vilanterol]; Ciprofloxacin; Statins-hmg-coa reductase inhibitors; Sulfa (sulfonamide antibiotics); Tetracycline; and Zetia [ezetimibe] Prior to Admission Medications Prescriptions Last Dose Informant Patient Reported? Taking? DULoxetine (CYMBALTA) 60 mg capsule 2019 at Unknown time Transfer Papers Yes Yes Sig: Take 60 mg by mouth daily. acetaminophen (TYLENOL) 325 mg tablet  Transfer Papers Yes Yes Sig: Take 650 mg by mouth every six (6) hours as needed for Pain. albuterol (PROAIR HFA) 90 mcg/actuation inhaler  Transfer Papers Yes Yes Sig: Take 1 Puff by inhalation every four (4) hours as needed. albuterol (PROVENTIL VENTOLIN) 2.5 mg /3 mL (0.083 %) nebulizer solution  Transfer Papers Yes Yes Si.5 mg by Nebulization route every six (6) hours as needed for Wheezing or Shortness of Breath. azelastine-fluticasone (DYMISTA) 137-50 mcg/spray spry 2019 at PM Transfer Papers Yes Yes Si Spray by Nasal route two (2) times a day. biotin 10,000 mcg cap 2019 at Unknown time Transfer Papers Yes Yes Sig: Take 1 Cap by mouth daily. bisacodyl (DULCOLAX) 10 mg suppository  Transfer Papers Yes Yes Sig: Insert 10 mg into rectum daily as needed (constipation). calcitonin, salmon, (MIACALCIN) nasal 2019 at Unknown time Transfer Papers Yes Yes Si [place] by IntraNASal route daily. Alternating nostril  
cholecalciferol (VITAMIN D3) 1,000 unit tablet 2019 at Unknown time Transfer Papers Yes Yes Sig: Take 1,000 Units by mouth daily. clopidogrel (PLAVIX) 75 mg tab 1/4/2019 at Unknown time Transfer Papers Yes Yes Sig: Take 75 mg by mouth daily. lactobacillus rhamnosus gg 10 billion cell (CULTURELLE) 10 billion cell capsule 1/4/2019 at Unknown time Transfer Papers Yes Yes Sig: Take 1 Cap by mouth daily. loperamide (IMODIUM) 2 mg capsule 1/5/2019 at Unknown time Transfer Papers Yes Yes Sig: Take 2 mg by mouth daily as needed for Diarrhea.  
magnesium hydroxide (CLARK MILK OF MAGNESIA) 400 mg/5 mL suspension  Transfer Papers Yes Yes Sig: Take 30 mL by mouth two (2) times daily as needed for Constipation. magnesium oxide (MAG-OX) 400 mg tablet 1/4/2019 at Unknown time Transfer Papers No Yes Sig: Take 1 Tab by mouth daily. metoprolol tartrate (LOPRESSOR) 25 mg tablet 1/4/2019 at PM Transfer Papers Yes Yes Sig: Take 25 mg by mouth two (2) times a day. mirtazapine (REMERON) 15 mg tablet 1/4/2019 at Unknown time Transfer Papers Yes Yes Sig: Take 15 mg by mouth nightly. mometasone-formoterol (DULERA) 200-5 mcg/actuation HFA inhaler 1/4/2019 at PM Transfer Papers Yes Yes Sig: Take 2 Puffs by inhalation two (2) times a day. montelukast (SINGULAIR) 10 mg tablet 1/4/2019 at PM Transfer Papers Yes Yes Sig: Take 10 mg by mouth nightly. ondansetron (ZOFRAN ODT) 4 mg disintegrating tablet 1/5/2019 at Unknown time Transfer Papers No Yes Sig: Take 1 Tab by mouth every eight (8) hours as needed. pantoprazole (PROTONIX) 20 mg tablet 1/4/2019 at Unknown time Transfer Papers Yes Yes Sig: Take 20 mg by mouth Daily (before dinner). polyethylene glycol (MIRALAX) 17 gram packet  Transfer Papers Yes Yes Sig: Take 17 g by mouth daily as needed. potassium chloride SR (KLOR-CON 10) 10 mEq tablet 1/4/2019 at PM Transfer Papers Yes Yes Sig: Take 10 mEq by mouth two (2) times daily (with meals). predniSONE (DELTASONE) 10 mg tablet 1/4/2019 at Unknown time Transfer Papers Yes Yes Sig: Take 10 mg by mouth daily. psyllium (METAMUCIL) packet 1/4/2019 at Unknown time Transfer Papers Yes Yes Sig: Take 1 Packet by mouth daily. rivaroxaban (XARELTO) 20 mg tab tablet 1/4/2019 at PM Transfer Papers No Yes Sig: Take 1 Tab by mouth daily (with dinner). saxagliptin (ONGLYZA) 5 mg tab tablet 1/4/2019 at Unknown time Transfer Papers Yes Yes Sig: Take 5 mg by mouth daily. Takes at 1630  
simethicone (MYLICON) 80 mg chewable tablet 1/5/2019 at Unknown time Transfer Papers Yes Yes Sig: Take 80 mg by mouth every six (6) hours as needed (gas). tiotropium (SPIRIVA WITH HANDIHALER) 18 mcg inhalation capsule 1/4/2019 at Unknown time Transfer Papers Yes Yes Sig: Take 1 Cap by inhalation daily.   
  
 
Jose Saravia, Pharmacist

## 2019-01-07 LAB
ALBUMIN SERPL-MCNC: 2.3 G/DL (ref 3.5–5)
ALBUMIN/GLOB SERPL: 0.7 {RATIO} (ref 1.1–2.2)
ALP SERPL-CCNC: 164 U/L (ref 45–117)
ALT SERPL-CCNC: 63 U/L (ref 12–78)
ANION GAP SERPL CALC-SCNC: 13 MMOL/L (ref 5–15)
AST SERPL-CCNC: 48 U/L (ref 15–37)
BASOPHILS # BLD: 0 K/UL (ref 0–0.1)
BASOPHILS NFR BLD: 1 % (ref 0–1)
BILIRUB SERPL-MCNC: 0.5 MG/DL (ref 0.2–1)
BUN SERPL-MCNC: 8 MG/DL (ref 6–20)
BUN/CREAT SERPL: 8 (ref 12–20)
CALCIUM SERPL-MCNC: 7.8 MG/DL (ref 8.5–10.1)
CHLORIDE SERPL-SCNC: 111 MMOL/L (ref 97–108)
CO2 SERPL-SCNC: 18 MMOL/L (ref 21–32)
CREAT SERPL-MCNC: 1.04 MG/DL (ref 0.55–1.02)
DIFFERENTIAL METHOD BLD: ABNORMAL
EOSINOPHIL # BLD: 0.3 K/UL (ref 0–0.4)
EOSINOPHIL NFR BLD: 4 % (ref 0–7)
ERYTHROCYTE [DISTWIDTH] IN BLOOD BY AUTOMATED COUNT: 16.1 % (ref 11.5–14.5)
GLOBULIN SER CALC-MCNC: 3.1 G/DL (ref 2–4)
GLUCOSE BLD STRIP.AUTO-MCNC: 106 MG/DL (ref 65–100)
GLUCOSE BLD STRIP.AUTO-MCNC: 128 MG/DL (ref 65–100)
GLUCOSE BLD STRIP.AUTO-MCNC: 147 MG/DL (ref 65–100)
GLUCOSE BLD STRIP.AUTO-MCNC: 155 MG/DL (ref 65–100)
GLUCOSE SERPL-MCNC: 135 MG/DL (ref 65–100)
HCT VFR BLD AUTO: 35.9 % (ref 35–47)
HGB BLD-MCNC: 10.6 G/DL (ref 11.5–16)
IMM GRANULOCYTES # BLD: 0 K/UL (ref 0–0.04)
IMM GRANULOCYTES NFR BLD AUTO: 1 % (ref 0–0.5)
LYMPHOCYTES # BLD: 1.4 K/UL (ref 0.8–3.5)
LYMPHOCYTES NFR BLD: 21 % (ref 12–49)
MAGNESIUM SERPL-MCNC: 1.8 MG/DL (ref 1.6–2.4)
MCH RBC QN AUTO: 29.8 PG (ref 26–34)
MCHC RBC AUTO-ENTMCNC: 29.5 G/DL (ref 30–36.5)
MCV RBC AUTO: 100.8 FL (ref 80–99)
MONOCYTES # BLD: 0.6 K/UL (ref 0–1)
MONOCYTES NFR BLD: 9 % (ref 5–13)
NEUTS SEG # BLD: 4.5 K/UL (ref 1.8–8)
NEUTS SEG NFR BLD: 66 % (ref 32–75)
NRBC # BLD: 0 K/UL (ref 0–0.01)
NRBC BLD-RTO: 0 PER 100 WBC
PLATELET # BLD AUTO: 322 K/UL (ref 150–400)
PMV BLD AUTO: 9.6 FL (ref 8.9–12.9)
POTASSIUM SERPL-SCNC: 3.2 MMOL/L (ref 3.5–5.1)
PROT SERPL-MCNC: 5.4 G/DL (ref 6.4–8.2)
RBC # BLD AUTO: 3.56 M/UL (ref 3.8–5.2)
SERVICE CMNT-IMP: ABNORMAL
SODIUM SERPL-SCNC: 142 MMOL/L (ref 136–145)
WBC # BLD AUTO: 6.8 K/UL (ref 3.6–11)

## 2019-01-07 PROCEDURE — 94760 N-INVAS EAR/PLS OXIMETRY 1: CPT

## 2019-01-07 PROCEDURE — 51798 US URINE CAPACITY MEASURE: CPT

## 2019-01-07 PROCEDURE — 74011250637 HC RX REV CODE- 250/637: Performed by: INTERNAL MEDICINE

## 2019-01-07 PROCEDURE — 97165 OT EVAL LOW COMPLEX 30 MIN: CPT

## 2019-01-07 PROCEDURE — 36415 COLL VENOUS BLD VENIPUNCTURE: CPT

## 2019-01-07 PROCEDURE — 97535 SELF CARE MNGMENT TRAINING: CPT

## 2019-01-07 PROCEDURE — 74011250636 HC RX REV CODE- 250/636: Performed by: SURGERY

## 2019-01-07 PROCEDURE — 77030034850

## 2019-01-07 PROCEDURE — 74011000258 HC RX REV CODE- 258: Performed by: SURGERY

## 2019-01-07 PROCEDURE — 99218 HC RM OBSERVATION: CPT

## 2019-01-07 PROCEDURE — 82962 GLUCOSE BLOOD TEST: CPT

## 2019-01-07 PROCEDURE — 74011250637 HC RX REV CODE- 250/637: Performed by: FAMILY MEDICINE

## 2019-01-07 PROCEDURE — 83735 ASSAY OF MAGNESIUM: CPT

## 2019-01-07 PROCEDURE — 85025 COMPLETE CBC W/AUTO DIFF WBC: CPT

## 2019-01-07 PROCEDURE — 97116 GAIT TRAINING THERAPY: CPT

## 2019-01-07 PROCEDURE — 74011636637 HC RX REV CODE- 636/637: Performed by: INTERNAL MEDICINE

## 2019-01-07 PROCEDURE — 80053 COMPREHEN METABOLIC PANEL: CPT

## 2019-01-07 RX ORDER — LANOLIN ALCOHOL/MO/W.PET/CERES
400 CREAM (GRAM) TOPICAL DAILY
Status: DISCONTINUED | OUTPATIENT
Start: 2019-01-07 | End: 2019-01-08 | Stop reason: HOSPADM

## 2019-01-07 RX ORDER — PREDNISONE 5 MG/1
10 TABLET ORAL DAILY
Status: DISCONTINUED | OUTPATIENT
Start: 2019-01-08 | End: 2019-01-08 | Stop reason: HOSPADM

## 2019-01-07 RX ORDER — DULOXETIN HYDROCHLORIDE 30 MG/1
60 CAPSULE, DELAYED RELEASE ORAL DAILY
Status: DISCONTINUED | OUTPATIENT
Start: 2019-01-07 | End: 2019-01-08 | Stop reason: HOSPADM

## 2019-01-07 RX ORDER — POTASSIUM CHLORIDE 750 MG/1
20 TABLET, FILM COATED, EXTENDED RELEASE ORAL 2 TIMES DAILY
Status: DISCONTINUED | OUTPATIENT
Start: 2019-01-07 | End: 2019-01-08 | Stop reason: HOSPADM

## 2019-01-07 RX ORDER — CLOPIDOGREL BISULFATE 75 MG/1
75 TABLET ORAL DAILY
Status: DISCONTINUED | OUTPATIENT
Start: 2019-01-07 | End: 2019-01-08 | Stop reason: HOSPADM

## 2019-01-07 RX ADMIN — POTASSIUM CHLORIDE 20 MEQ: 750 TABLET, EXTENDED RELEASE ORAL at 16:38

## 2019-01-07 RX ADMIN — INSULIN LISPRO 2 UNITS: 100 INJECTION, SOLUTION INTRAVENOUS; SUBCUTANEOUS at 16:36

## 2019-01-07 RX ADMIN — MIRTAZAPINE 15 MG: 15 TABLET, FILM COATED ORAL at 21:01

## 2019-01-07 RX ADMIN — CEFTRIAXONE SODIUM 1 G: 1 INJECTION, POWDER, FOR SOLUTION INTRAMUSCULAR; INTRAVENOUS at 15:29

## 2019-01-07 RX ADMIN — METOPROLOL TARTRATE 25 MG: 25 TABLET ORAL at 20:59

## 2019-01-07 RX ADMIN — Medication 400 MG: at 09:10

## 2019-01-07 RX ADMIN — Medication 10 ML: at 21:02

## 2019-01-07 RX ADMIN — DULOXETINE 60 MG: 30 CAPSULE, DELAYED RELEASE ORAL at 09:10

## 2019-01-07 RX ADMIN — POTASSIUM CHLORIDE 20 MEQ: 750 TABLET, EXTENDED RELEASE ORAL at 20:59

## 2019-01-07 RX ADMIN — INSULIN LISPRO 2 UNITS: 100 INJECTION, SOLUTION INTRAVENOUS; SUBCUTANEOUS at 12:23

## 2019-01-07 RX ADMIN — RIVAROXABAN 20 MG: 10 TABLET, FILM COATED ORAL at 16:38

## 2019-01-07 RX ADMIN — CLOPIDOGREL BISULFATE 75 MG: 75 TABLET ORAL at 09:10

## 2019-01-07 RX ADMIN — Medication 10 ML: at 15:32

## 2019-01-07 RX ADMIN — PANTOPRAZOLE SODIUM 40 MG: 40 TABLET, DELAYED RELEASE ORAL at 16:38

## 2019-01-07 RX ADMIN — METOPROLOL TARTRATE 25 MG: 25 TABLET ORAL at 09:10

## 2019-01-07 NOTE — PROGRESS NOTES
Bladder scan with over 600ml or urine. Performed straight cath with Casi Singh RN. Kendal care performed, Patient tolerated well. Urine is orange and foul smelling with 400ml or urine output

## 2019-01-07 NOTE — PROGRESS NOTES
NUTRITION 
 
RECOMMENDATIONS:  
 
1. Diabetic Consistent Carb diet with elevated BG, HgbA1C: 8.5% 11/13/18- (recheck A1C in 1-2 months.) ASSESSMENT:  
1/7/19: Consult received for general nutrition management and supplements. Currently with SBO - diet advanced to regular, hx: DM.  Visited pt who reports good appetite and has been eating well. Ate half of a bagel, most of cream of wheat and one and a half turkey sausage links at breakfast this morning. She reports  lbs prior to Thanksgiving 2018, 31 lb weight loss ~ 2 months- she states from being in the hospital and rehab - \"I have been eating well, 3 meals, good appetite, just not as much as usual.\"  She is happy with the weight loss. States she thought the diarrhea was gone but had it early in the morning. States she takes Glipizide at home for DM. Noted hx Prednisone. Pt agreeable to Diabetic Diet. May consider Banatrol is diarrhea persists. RD to follow. Past Medical History:  
Diagnosis Date  Asthma  Atrial fibrillation ([address] 11/16/2018  Autoimmune disease ([address]   
 fibromyalgia  CAD (coronary artery disease) 10/9/2015  Closed wedge compression fracture of T7 vertebra ([facility] 11/13/2018  Diabetes ([facility]  DVT (deep vein thrombosis) in pregnancy ([facility] [facility]  GERD (gastroesophageal reflux disease)  HTN (hypertension)  NSTEMI (non-ST elevated myocardial infarction) ([facility] [address] 10/9/2015  Obesity (BMI 30-39.9) 11/13/2018  Sleep apnea   
 wears CPAP Diet: regular %PO intake: 
No data found. Abd: hypoactive bs           BM: 1/6/19 Skin Integrity: []Intact  [x]Other: Buttocks wound- stage 1 blanchable per flow sheet Edema: [x]None []Other Nutritionally Significant Medications: [x] Reviewed & Includes: SSI, Lactated Ringers @ 125 ml/hr, Remeron Labs:   
Lab Results Component Value Date/Time  Sodium 142 01/06/2019 04:36 AM  
 Potassium 3.0 (L) 01/06/2019 04:36 AM  
 Chloride 110 (H) 01/06/2019 04:36 AM  
 CO2 19 (L) 01/06/2019 04:36 AM  
 Anion gap 13 01/06/2019 04:36 AM  
 Glucose 100 01/06/2019 04:36 AM  
 BUN 10 01/06/2019 04:36 AM  
 Creatinine 0.97 01/06/2019 04:36 AM  
 Calcium 8.1 (L) 01/06/2019 04:36 AM  
 Magnesium 1.2 (L) 01/06/2019 04:36 AM  
 Phosphorus 3.8 01/06/2019 04:36 AM  
 Albumin 2.3 (L) 01/06/2019 04:36 AM  
 
 
Anthropometrics:  
Weight Source: Patient stated Height: 5' 6.5\" (168.9 cm), Height Source: Stated by patient Body mass index is 34.82 kg/m². IBW : 60.1 kg (132 lb 8 oz), % IBW (Calculated): 165.28 %, Usual Body Weight: 113.4 kg (250 lb)(prior to [facility] 2018), Wt Readings from Last 5 Encounters:  
01/07/19 99.3 kg (219 lb)  
11/19/18 99.4 kg (219 lb 3.2 oz)  
11/14/18 110.2 kg (243 lb) 10/19/18 118.8 kg (261 lb 14.5 oz) 10/17/18 112.5 kg (248 lb) Estimated Daily Nutrition Requirements:  
Weight Used: Other (comment)(stated weight 219 lbs/99.5 kgs) Kcals: 1845 Kcals/day Based on:[address](BMR x 1.2) Protein: 79 g(to 99 gms, 0.8-1.0 gms/kg) Fluid: 2475 ml Carbohydrate: At least 130 gms/day Education & Discharge Needs: 
 [] Pt discussed in ID rounds Nutrition related discharge needs addressed:  
  [] Supplements (on d/c instruction &/or coupons provided) 
  [] Tube Feedings  
  [] Education  
 [x]No nutrition related discharge needs at this time Cultural, Jehovah's witness and ethnic food preferences identified  
 [x] None   [] Yes NUTRITION DIAGNOSIS:  
 
Altered nutrition-related lab values related to DM  as evidenced by HgbA1C: 8.5% 11/13/18, POC testes: 128, 123, 133, 106, 155 Pt is at Nutrition Risk:  [x] No Nutrition Risk Identified:  [] 
 
RD INTERVENTION / PT GOALS:  
 
Food/Nutrient Delivery:  Meals/Snacks: General/healthful diet(Diabetic Diet),  ,  ,  ,   
Nutrition Education: ,   
Nutrition Counseling:   
Coordination of Care: Goal: Pt will have BG control with Diabetic Diet, 75% or more at meals and decreased diarrhea within 5-7 days MONITORING & EVALUATION:  
Food/Nutrient Intake Outcomes: Total energy intake, Carbohydrate intake, Fiber intake Physical Signs/Symptoms Outcomes: Glucose profile, GI profile Previous Nutrition Goals: 
Previous Goal Met: N/A Previous Recommendations:     
Previous Recommendations Implemented: N/A 
 
   Emery Fritz RD

## 2019-01-07 NOTE — PROGRESS NOTES
Daily Progress Note: 1/7/2019 Christin Leung MD 
 
Assessment/Plan:  
Partial small bowel obstruction. --General surgery consult.  
--NPO initially, clears to solid on 1/6, ADAT  
--Anti-emetics and pain control. --IVF hydration with LR 
--Repeat Abd Xray 
  
Sinus tachycardia. I think this is dehydration mediated. -- restarted coreg 
  
Coronary artery disease involving native coronary artery without angina pectoris. --Hold oral cardiac meds at this time until able to take PO. 
  
Essential hypertension (1/22/2016). --Hold oral anti-hypertensives. PRN hydralazine. 
  
Recurrent deep vein thrombosis /  Atrial fibrillation. --Hold xarelto while NPO. --Lovenox ppx for now. --Restart coreg as soon as possible to avoid RVR episode. 
  
Type 2 diabetes with nephropathy ([facility] (10/1/2018). Does not appear to be on insulin. Hold oral meds. --SSI per protocol. --Had a1c within past 3 mo (8/5%) 
  
Obesity (BMI 30-39.9) (11/13/2018). Would benefit from weight loss 
  
Sleep apnea. CPAP qhs 
  
Electrolyte Imbalance 
--Replete and Monitor Urinary Retention 
--field as needed Problem List: 
Problem List as of 1/7/2019 Date Reviewed: 11/27/2018 Codes Class Noted - Resolved * (Principal) Partial small bowel obstruction ([facility] ICD-10-CM: K56.600 ICD-9-CM: 560.9  1/5/2019 - Present Sleep apnea ICD-10-CM: G47.30 ICD-9-CM: 780.57  1/5/2019 - Present Overview Signed 1/5/2019  8:41 PM by Ricardo López DO  
  wears CPAP Atrial fibrillation [facility]) ICD-10-CM: I48.91 
ICD-9-CM: 427.31  11/16/2018 - Present Dyspnea ICD-10-CM: R06.00 
ICD-9-CM: 786.09  11/13/2018 - Present ARF (acute renal failure) (HCC) ICD-10-CM: N17.9 ICD-9-CM: 584.9  11/13/2018 - Present Obesity (BMI 30-39.9) (Chronic) ICD-10-CM: C52.1 ICD-9-CM: 278.00  11/13/2018 - Present  Closed wedge compression fracture of T7 vertebra ([facility] ICD-10-CM: S22.060A ICD-9-CM: 805.2  11/13/2018 - Present Type 2 diabetes with nephropathy ([facility] ICD-10-CM: E11.21 
ICD-9-CM: 250.40, 583.81  10/1/2018 - Present Chest pain ICD-10-CM: R07.9 ICD-9-CM: 786.50  6/27/2018 - Present Generalized abdominal pain ICD-10-CM: R10.84 ICD-9-CM: 789.07  6/27/2018 - Present Recurrent deep vein thrombosis (DVT) (HCC) ICD-10-CM: I82.409 ICD-9-CM: 453.40  3/30/2018 - Present Chronic anticoagulation (Chronic) ICD-10-CM: Z79.01 
ICD-9-CM: V58.61  3/30/2018 - Present Coronary artery disease involving native coronary artery without angina pectoris (Chronic) ICD-10-CM: I25.10 ICD-9-CM: 414.01  1/22/2016 - Present Essential hypertension (Chronic) ICD-10-CM: I10 
ICD-9-CM: 401.9  1/22/2016 - Present Type II diabetes mellitus (HCC) (Chronic) ICD-10-CM: E11.9 ICD-9-CM: 250.00  10/9/2015 - Present NSTEMI (non-ST elevated myocardial infarction) ([facility] ICD-10-CM: I21.4 ICD-9-CM: 410.70  10/9/2015 - Present RESOLVED: CAD (coronary artery disease) ICD-10-CM: I25.10 ICD-9-CM: 414.00  10/9/2015 - 1/22/2016 RESOLVED: HTN (hypertension) ICD-10-CM: I10 
ICD-9-CM: 401.9  10/9/2015 - 1/22/2016 Subjective:  
 72 y.o.  female with a hx of Afib, CAD, HAYES, HTN, DM2, recurrent DVT who presented to the Emergency Department complaining of 1 day of diarrhea. Patient reports onset of diarrhea yesterday, apparently continued throughout the day, associated with nausea, anorexia, vague/diffuse abd tenderness that did not improve with imodium last night. This AM, had last episode of diarrhea as well as an episode of vomiting. Has been unable to take any PO at all today and came to ED. In ED, CT scan showed early pSBO. We will admit in concert with our general surgery colleagues. ( Dr Chandra Camarillo) 
  
1/6: Feeling better this am. Less pain. No nausea.  K+ and Mg low so will replete. Decrease IVF and recheck abd Xray. Surgery c/s pending. She is on Prednisone for TA. Will restart if she is improving and no surgical intervention needed. :  No further N/V and tolerated diet last night. Urinary retention last night and needed field. AM labs pending. Review of Systems: A comprehensive review of systems was negative except for that written in the HPI. Objective:  
Physical Exam:  
 
Visit Vitals /52 (BP 1 Location: Left arm, BP Patient Position: At rest) Pulse 90 Temp 98.3 °F (36.8 °C) Resp 17 Ht 5' 6\" (1.676 m) Wt 99.3 kg (219 lb) SpO2 96% BMI 35.35 kg/m² O2 Device: Room air Temp (24hrs), Av.4 °F (36.9 °C), Min:98.1 °F (36.7 °C), Max:99.3 °F (37.4 °C) No intake/output data recorded.  1901 -  0700 In: 12 [P.O.:300; I.V.:500] Out: 800 [Urine:400] General:  Alert, cooperative, no distress, appears stated age. Obese Head:  Normocephalic, without obvious abnormality, atraumatic. Eyes:  Conjunctivae/corneas clear. PERRL, EOMs intact. Nose: Nares normal. Septum midline. Mucosa normal. No drainage or sinus tenderness. Throat: Lips, mucosa, and tongue moist.. Neck: Supple, symmetrical, trachea midline, no adenopathy, thyroid: no enlargement/tenderness/nodules, no carotid bruit and no JVD. Back:   Symmetric, no curvature. ROM normal. No CVA tenderness. Lungs:   Clear to auscultation bilaterally. Chest wall:  No tenderness or deformity. Heart:  Irregular rate and rhythm, S1, S2 normal, no murmur, click, rub or gallop. Abdomen:   Soft, no tenderness RLQ and LLQ. Bowel sounds diminished. No masses,  No organomegaly. Extremities: no cyanosis or edema. No calf tenderness or cords. Pulses: 2+ and symmetric all extremities. Skin: Skin color, texture, turgor normal. No rashes or lesions Neurologic: CNII-XII intact. Alert and oriented X 3.   Fine motor of hands and fingers normal.   equal.  No cogwheeling or rigidity. Gait not tested at this time. Sensation grossly normal to touch. Gross motor of extremities normal.    
 
Data Review:  
   
Recent Days: 
Recent Labs 01/06/19 0436 01/05/19 1906 WBC 7.7 10.4 HGB 10.4* 12.9 HCT 33.9* 44.0  
 351 Recent Labs 01/06/19 
0436 01/05/19 1906  142  
K 3.0* 3.9 * 108 CO2 19* 17*  145* BUN 10 16 CREA 0.97 1.12* CA 8.1* 9.3 MG 1.2*  --   
PHOS 3.8  --   
ALB 2.3* 3.0* TBILI 0.7 1.0 SGOT 65* 82* ALT 74 96* No results for input(s): PH, PCO2, PO2, HCO3, FIO2 in the last 72 hours. 24 Hour Results: 
Recent Results (from the past 24 hour(s)) GLUCOSE, POC Collection Time: 01/06/19 11:21 AM  
Result Value Ref Range Glucose (POC) 128 (H) 65 - 100 mg/dL Performed by Juan C Jackson ([facility]) GLUCOSE, POC Collection Time: 01/06/19  4:19 PM  
Result Value Ref Range Glucose (POC) 123 (H) 65 - 100 mg/dL Performed by Juan C Jackson ([facility]) GLUCOSE, POC Collection Time: 01/06/19  9:18 PM  
Result Value Ref Range Glucose (POC) 133 (H) 65 - 100 mg/dL Performed by Enma Gregorio ([facility]) GLUCOSE, POC Collection Time: 01/07/19  6:50 AM  
Result Value Ref Range Glucose (POC) 106 (H) 65 - 100 mg/dL Performed by Enma Gregorio ([facility]) Medications reviewed Current Facility-Administered Medications Medication Dose Route Frequency  lactated Ringers infusion  125 mL/hr IntraVENous CONTINUOUS  
 sodium chloride (NS) flush 5-10 mL  5-10 mL IntraVENous Q8H  
 sodium chloride (NS) flush 5-10 mL  5-10 mL IntraVENous PRN  
 lactated Ringers infusion  75 mL/hr IntraVENous CONTINUOUS  
 morphine injection 2 mg  2 mg IntraVENous Q4H PRN  
 naloxone (NARCAN) injection 0.4 mg  0.4 mg IntraVENous PRN  
 ondansetron (ZOFRAN ODT) tablet 4 mg  4 mg Oral Q6H PRN  
 enoxaparin (LOVENOX) injection 40 mg  40 mg SubCUTAneous Q24H  hydrALAZINE (APRESOLINE) 20 mg/mL injection 20 mg  20 mg IntraVENous Q6H PRN  
 glucose chewable tablet 16 g  4 Tab Oral PRN  
 dextrose (D50W) injection syrg 12.5-25 g  25-50 mL IntraVENous PRN  
 glucagon (GLUCAGEN) injection 1 mg  1 mg IntraMUSCular PRN  
 insulin lispro (HUMALOG) injection   SubCUTAneous AC&HS  
 metoprolol tartrate (LOPRESSOR) tablet 25 mg  25 mg Oral BID  mirtazapine (REMERON) tablet 15 mg  15 mg Oral QHS  pantoprazole (PROTONIX) tablet 40 mg  40 mg Oral ACD Care Plan discussed with: Patient/Family and Nurse Total time spent with patient: 30 minutes.  
 
Rosangela Flores MD

## 2019-01-07 NOTE — PROGRESS NOTES
Problem: Self Care Deficits Care Plan (Adult) Goal: *Acute Goals and Plan of Care (Insert Text) Occupational Therapy Goals Initiated 1/7/2019 1. Patient will perform bathing with modified independence within 7 day(s). 2.  Patient will perform lower body dressing with modified independence within 7 day(s) using AE. 3.  Patient will perform toilet transfers with modified independence within 7 day(s). 4.  Patient will perform all aspects of toileting with modified independence within 7 day(s). 5.  Patient will participate in upper extremity therapeutic exercise/activities with modified independence for 10 minutes within 7 day(s). 6.  Patient will utilize energy conservation techniques during functional activities with verbal cues within 7 day(s). Occupational Therapy EVALUATION Patient: Lillian Rojas (48 y.o. female) Date: 1/7/2019 Primary Diagnosis: Partial small bowel obstruction (HCC) Precautions:   Fall ASSESSMENT : 
Based on the objective data described below, the patient presents close to baseline with self-care and mobility. Pt pleasant and motivated. Daughter present in the room. Pt was about to return back to her independent apt in [address] [place] [address] from rehab when she started to have vomiting and stomach pains. Today,  presents at min assist level with LB self-care and CGA with mobility using RW. Pt would benefit from 1-2 OT visits to address listed goals. Recommend returning back to her apt once medically cleared and start her HHPT/OT as planned. Patient will benefit from skilled intervention to address the above impairments. Patients rehabilitation potential is considered to be Good Factors which may influence rehabilitation potential include:  
[x]             None noted []             Mental ability/status []             Medical condition []             Home/family situation and support systems []             Safety awareness []             Pain tolerance/management 
[]             Other: PLAN : 
Recommendations and Planned Interventions: 
[x]               Self Care Training                  [x]        Therapeutic Activities [x]               Functional Mobility Training    []        Cognitive Retraining 
[]               Therapeutic Exercises           [x]        Endurance Activities []               Balance Training                   []        Neuromuscular Re-Education []               Visual/Perceptual Training     [x]   Home Safety Training 
[x]               Patient Education                 [x]        Family Training/Education []               Other (comment): Frequency/Duration: Patient will be followed by occupational therapy 5 times a week to address goals. Discharge Recommendations: Home Health Further Equipment Recommendations for Discharge: None noted, pt has AE and DME SUBJECTIVE:  
Patient stated I was about to be discharged when this happened.  OBJECTIVE DATA SUMMARY:  
HISTORY:  
Past Medical History:  
Diagnosis Date  Asthma  Atrial fibrillation ([facility] 11/16/2018  Autoimmune disease ([facility]   
 fibromyalgia  CAD (coronary artery disease) 10/9/2015  Closed wedge compression fracture of T7 vertebra ([facility] 11/13/2018  Diabetes ([facility]  DVT (deep vein thrombosis) in pregnancy ([facility]  GERD (gastroesophageal reflux disease)  HTN (hypertension)  NSTEMI (non-ST elevated myocardial infarction) ([facility] 10/9/2015  Obesity (BMI 30-39.9) 11/13/2018  Sleep apnea   
 wears CPAP Past Surgical History:  
Procedure Laterality Date  ABDOMEN SURGERY PROC UNLISTED    
 hital hernia repair, gall bladder removed  BREAST SURGERY PROCEDURE UNLISTED    
 lumpectomy  CARDIAC SURG PROCEDURE UNLIST  10/9/15  
 2 stents [place]  HX COLOSTOMY    
 and reversal, post episiotomy repair [address] [place]  HX UROLOGICAL  2009  
 bladder sling Prior Level of Function/Environment/Context: mod I, used rollator Occupations in which the patient is/was successful, what are the barriers preventing that success:  
Performance Patterns (routines, roles, habits, and rituals):  
Personal Interests and/or values:  
Expanded or extensive additional review of patient history:  
 
Home Situation Home Environment: Independent living Care Facility Name: [address] [place] [address] # Steps to Enter: 0 One/Two Story Residence: One story Living Alone: Yes Support Systems: Child(helio) Patient Expects to be Discharged to[de-identified] Assisted living Current DME Used/Available at Home: Gurmeet Guevara, rollator, Benedict Darrion, straight, CPAP, Wheelchair, Walker, rolling Tub or Shower Type: Shower EXAMINATION OF PERFORMANCE DEFICITS: 
Cognitive/Behavioral Status: 
Neurologic State: Alert Orientation Level: Oriented X4 Cognition: Appropriate decision making Skin: fragile skin Edema: edema noted throughout UB/LB Hearing: Auditory Auditory Impairment: Hard of hearing, bilateral 
Vision/Perceptual:   
    
    
    
  
    
    
  
Range of Motion: 
 
AROM: Within functional limits Strength: 
 
Strength: Generally decreased, functional 
  
  
  
  
Coordination: 
Coordination: Within functional limits Fine Motor Skills-Upper: Left Intact; Right Intact Gross Motor Skills-Upper: Left Intact; Right Intact Tone & Sensation: 
 
  
  
  
  
  
  
  
  
Balance: 
Sitting: Intact Standing: Intact; With support Functional Mobility and Transfers for ADLs:Bed Mobility: 
Supine to Sit: Minimum assistance Scooting: Modified independent Transfers: 
Sit to Stand: Contact guard assistance Bed to Chair: Contact guard assistance Bathroom Mobility: Contact guard assistance Toilet Transfer : Contact guard assistance ADL Assessment: 
Feeding: Independent Oral Facial Hygiene/Grooming: Setup Bathing: Minimum assistance Upper Body Dressing: Setup Lower Body Dressing: Minimum assistance Toileting: Contact guard assistance ADL Intervention and task modifications: 
  
 
  
 
  
 
  
 
 
Functional Measure: 
Barthel Index: 
 
Bathin Bladder: 5 Bowels: 10 
Groomin Dressin Feeding: 10 Mobility: 10 Stairs: 0 Toilet Use: 5 Transfer (Bed to Chair and Back): 10 Total: 60 The Barthel ADL Index: Guidelines 1. The index should be used as a record of what a patient does, not as a record of what a patient could do. 2. The main aim is to establish degree of independence from any help, physical or verbal, however minor and for whatever reason. 3. The need for supervision renders the patient not independent. 4. A patient's performance should be established using the best available evidence. Asking the patient, friends/relatives and nurses are the usual sources, but direct observation and common sense are also important. However direct testing is not needed. 5. Usually the patient's performance over the preceding 24-48 hours is important, but occasionally longer periods will be relevant. 6. Middle categories imply that the patient supplies over 50 per cent of the effort. 7. Use of aids to be independent is allowed. Alpa Mueller., Barthel, DJosiahW. (4217). Functional evaluation: the Barthel Index. [address] [facility] (14)2. Jovana Marquis belen DAMI Riley, Gustavo Moreno., Rony Bryson., [place], [address] [place] [address] (). Measuring the change indisability after inpatient rehabilitation; comparison of the responsiveness of the Barthel Index and Functional [place] Measure. Journal of Neurology, Neurosurgery, and Psychiatry, 66(4), 502-667. Melissa Barahona, N.J.A, SANIYA Parry, & Steve South M.A. (2004.) Assessment of post-stroke quality of life in cost-effectiveness studies: The usefulness of the Barthel Index and the EuroQoL-5D. [facility], 13, 351-64 Occupational Therapy Evaluation Charge Determination History Examination Decision-Making LOW Complexity : Brief history review  LOW Complexity : 1-3 performance deficits relating to physical, cognitive , or psychosocial skils that result in activity limitations and / or participation restrictions  LOW Complexity : No comorbidities that affect functional and no verbal or physical assistance needed to complete eval tasks Based on the above components, the patient evaluation is determined to be of the following complexity level: LOW Pain: 
  
  
  
  
  
  
Activity Tolerance:  
Good Please refer to the flowsheet for vital signs taken during this treatment. After treatment:  
[x] Patient left in no apparent distress sitting up in chair 
[x] Patient left in no apparent distress in bed 
[x] Call bell left within reach 
[] Nursing notified 
[x] Caregiver present 
[] Bed alarm activated COMMUNICATION/EDUCATION:  
The patients plan of care was discussed with: Physical Therapist and Registered Nurse. [x] Home safety education was provided and the patient/caregiver indicated understanding. [x] Patient/family have participated as able in goal setting and plan of care. [x] Patient/family agree to work toward stated goals and plan of care. [] Patient understands intent and goals of therapy, but is neutral about his/her participation. [] Patient is unable to participate in goal setting and plan of care. This patients plan of care is appropriate for delegation to [facility]. Thank you for this referral. 
Karol Rhodes, OTR/L Time Calculation: 25 mins

## 2019-01-07 NOTE — PROGRESS NOTES
SURGERY PROGRESS NOTE Admit Date: 2019 POD * No surgery found * Procedure: * No surgery found * Subjective:  
Feels much better today Diarrhea improved Tolerating diet Urinary retention yesterday now with field Objective:  
 
Visit Vitals /56 (BP 1 Location: Left arm, BP Patient Position: At rest) Pulse 90 Temp 98.2 °F (36.8 °C) Resp 18 Ht 5' 6.5\" (1.689 m) Wt 99.3 kg (219 lb) SpO2 97% BMI 34.82 kg/m² Temp (24hrs), Av.4 °F (36.9 °C), Min:98.2 °F (36.8 °C), Max:99.3 °F (37.4 °C) Physical Exam: Abdomen:  Soft. Non-tender, non-distended. Lab Results Component Value Date/Time WBC 6.8 2019 02:25 PM  
 HGB 10.6 (L) 2019 02:25 PM  
 Hemoglobin (POC) 13.9 10/09/2015 12:27 PM  
 HCT 35.9 2019 02:25 PM  
 Hematocrit (POC) 31 (L) 2018 02:00 PM  
 PLATELET 790  02:25 PM  
 .8 (H) 2019 02:25 PM  
 
Lab Results Component Value Date/Time GFR est non-AA 52 (L) 2019 02:25 PM  
 GFRNA, POC 44 (L) 10/12/2018 04:14 PM  
 GFR est AA >60 2019 02:25 PM  
 GFRAA, POC 54 (L) 10/12/2018 04:14 PM  
 Creatinine 1.04 (H) 2019 02:25 PM  
 Creatinine (POC) 1.2 10/12/2018 04:14 PM  
 BUN 8 2019 02:25 PM  
 BUN (POC) 23 (H) 2018 02:00 PM  
 Sodium 142 2019 02:25 PM  
 Sodium (POC) 137 2018 02:00 PM  
 Potassium 3.2 (L) 2019 02:25 PM  
 Potassium (POC) 3.9 2018 02:00 PM  
 Chloride 111 (H) 2019 02:25 PM  
 Chloride (POC) 104 2018 02:00 PM  
 CO2 18 (L) 2019 02:25 PM  
 Magnesium 1.8 2019 02:25 PM  
 Phosphorus 3.8 2019 04:36 AM  
 
 
Assessment:  
 
Principal Problem: 
  Partial small bowel obstruction ([facility] (2019) Active Problems: 
  Coronary artery disease involving native coronary artery without angina pectoris (2016) Essential hypertension (2016) Recurrent deep vein thrombosis (DVT) ([facility] (3/30/2018) Type 2 diabetes with nephropathy ([facility] (10/1/2018) Obesity (BMI 30-39.9) (11/13/2018) Sleep apnea (1/5/2019) Overview: wears CPAP Atrial fibrillation ([facility] (11/16/2018) Plan/Recommendations/Medical Decision Making:  
Urinary retention field today consider trial d/c tomorrow IV abx for UTI Diet as tolerates

## 2019-01-07 NOTE — PROGRESS NOTES
Problem: Mobility Impaired (Adult and Pediatric) Goal: *Acute Goals and Plan of Care (Insert Text) Physical Therapy Goals Initiated 1/6/2019 1. Patient will move from supine to sit and sit to supine  in bed with moderate assistance  within 7 day(s). 2.  Patient will transfer from bed to chair and chair to bed with moderate assistance  using the least restrictive device within 7 day(s). 3.  Patient will perform sit to stand with moderate assistance  within 7 day(s). 4.  Patient will ambulate with moderate assistance  for 150 feet with the least restrictive device within 7 day(s). physical Therapy TREATMENT Patient: Denise Haas (48 y.o. female) Date: 1/7/2019 Diagnosis: Partial small bowel obstruction (HCC) Partial small bowel obstruction ([address] Precautions: Fall Chart, physical therapy assessment, plan of care and goals were reviewed. ASSESSMENT: 
Patient able to increase ambulation distance to 70 feet with RW, requires constant verbal cuing throughout for pursed lip breathing due to dyspnea on exertion. Patient sats are 96% with activity and heart rate: 100. At rest: O2 saturation on room air 97%, heart rate: 90 bpmPatient blood pressure improved and not orthostatic with today's session. Instructed to perform LE supine exercises for improved LE strengthening outside of therapy time. Progression toward goals: 
[x]    Improving appropriately and progressing toward goals 
[]    Improving slowly and progressing toward goals 
[]    Not making progress toward goals and plan of care will be adjusted PLAN: 
Patient continues to benefit from skilled intervention to address the above impairments. Continue treatment per established plan of care. Discharge Recommendations:  Home Health Further Equipment Recommendations for Discharge: SUBJECTIVE:  
Patient stated Mata Said just got back in bed.  OBJECTIVE DATA SUMMARY:  
Critical Behavior: 
Neurologic State: Alert Orientation Level: Oriented X4 Cognition: Appropriate decision making Safety/Judgement: Awareness of environment, Good awareness of safety precautions, Insight into deficits Functional Mobility Training: 
Bed Mobility: 
  
Supine to Sit: Contact guard assistance Sit to Supine: Minimum assistance Scooting: Modified independent Transfers: 
Sit to Stand: Contact guard assistance Stand to Sit: Contact guard assistance Bed to Chair: Contact guard assistance Balance: 
Sitting: Intact Standing: Intact; With supportAmbulation/Gait Training: 
Distance (ft): 70 Feet (ft) Assistive Device: Walker, rolling;Gait belt Ambulation - Level of Assistance: Contact guard assistance Gait Description (WDL): Exceptions to WDL 
  
  
  
  
  
  
  
  
  
  
  
  
 s 
Stairs: 
  
  
   
 
Neuro Re-Education: 
 
Therapeutic Exercises:  
Supine: heel slides, quad sets, hip ABD 
1 set of 10 Pain: 
  
  
  
  
  
  
Activity Tolerance: No complications with upright activity Please refer to the flowsheet for vital signs taken during this treatment. After treatment:  
[]    Patient left in no apparent distress sitting up in chair 
[x]    Patient left in no apparent distress in bed 
[x]    Call bell left within reach [x]    Nursing notified 
[x]    Caregiver present 
[]    Bed alarm activated COMMUNICATION/COLLABORATION:  
The patients plan of care was discussed with: Registered Nurse Ramy Duckworth, PT, DPT Time Calculation: 15 mins

## 2019-01-07 NOTE — PROGRESS NOTES
Reason for Admission:   Partial small bowel obstruction RRAT Score:   34 Resources/supports as identified by patient/family:   Pt's daughter and son-in-law Top Challenges facing patient (as identified by patient/family and CM): Finances/Medication cost?  No issues noted. Pt uses [address] on [address]. Transportation? Pt drives but has been relying on her family. Support system or lack thereof? See above Living arrangements? Lives at [facility] in [place] [address] apartment Self-care/ADLs/Cognition? Able to perform ADLs independently. Current Advanced Directive/Advance Care Plan:  Pt has an ACP but it is not on file. She said her daughter will bring a copy to the hospital. 
                       
Plan for utilizing home health:    Pt will return to [facility] to complete her rehab. Likelihood of readmission: High 
              
Transition of Care Plan:    Return to SNF 
CM met with pt to confirm d/c plan. Referral was sent in [employer]. Pt's daughter will transport on day of d/c. Pt was notified of observation status and provided notification letters. Albina Miller LCSW Care Management Interventions PCP Verified by CM: Yes(Dr. Marco A Quispe; no nurse navigator) Palliative Care Criteria Met (RRAT>21 & CHF Dx)?: No 
Discharge Durable Medical Equipment: No 
Physical Therapy Consult: Yes Occupational Therapy Consult: Yes Speech Therapy Consult: No 
Current Support Network: Family Lives [place] Confirm Follow Up Transport: Family Plan discussed with Pt/Family/Caregiver: Yes Freedom of Choice Offered: Yes Discharge Location Discharge Placement: Skilled nursing facility

## 2019-01-07 NOTE — ROUTINE PROCESS
..Bedside shift change report given to Janelle Orozco RN (oncoming nurse) by Yesica Hunt RN (offgoing nurse). Report included the following information SBAR, Kardex, Intake/Output, MAR, Accordion, Procedure Verification and Quality Measures.

## 2019-01-07 NOTE — PROGRESS NOTES
PT/OT recommendations noted for [facility] and discussed with pt. She is in agreement with returning home with [facility]. Choice letter has been signed for [facility]. Will need HH order. Benny Andrew LCSW

## 2019-01-08 VITALS
HEART RATE: 87 BPM | TEMPERATURE: 98.2 F | RESPIRATION RATE: 18 BRPM | DIASTOLIC BLOOD PRESSURE: 48 MMHG | BODY MASS INDEX: 34.37 KG/M2 | SYSTOLIC BLOOD PRESSURE: 102 MMHG | OXYGEN SATURATION: 95 % | HEIGHT: 67 IN | WEIGHT: 219 LBS

## 2019-01-08 PROBLEM — K56.609 BOWEL OBSTRUCTION (HCC): Status: ACTIVE | Noted: 2019-01-08

## 2019-01-08 LAB
ALBUMIN SERPL-MCNC: 2.1 G/DL (ref 3.5–5)
ALBUMIN/GLOB SERPL: 0.7 {RATIO} (ref 1.1–2.2)
ALP SERPL-CCNC: 144 U/L (ref 45–117)
ALT SERPL-CCNC: 53 U/L (ref 12–78)
ANION GAP SERPL CALC-SCNC: 11 MMOL/L (ref 5–15)
AST SERPL-CCNC: 43 U/L (ref 15–37)
BASOPHILS # BLD: 0 K/UL (ref 0–0.1)
BASOPHILS NFR BLD: 1 % (ref 0–1)
BILIRUB SERPL-MCNC: 0.5 MG/DL (ref 0.2–1)
BUN SERPL-MCNC: 5 MG/DL (ref 6–20)
BUN/CREAT SERPL: 6 (ref 12–20)
CALCIUM SERPL-MCNC: 8.1 MG/DL (ref 8.5–10.1)
CHLORIDE SERPL-SCNC: 113 MMOL/L (ref 97–108)
CO2 SERPL-SCNC: 21 MMOL/L (ref 21–32)
CREAT SERPL-MCNC: 0.9 MG/DL (ref 0.55–1.02)
DIFFERENTIAL METHOD BLD: ABNORMAL
EOSINOPHIL # BLD: 0.2 K/UL (ref 0–0.4)
EOSINOPHIL NFR BLD: 4 % (ref 0–7)
ERYTHROCYTE [DISTWIDTH] IN BLOOD BY AUTOMATED COUNT: 16.2 % (ref 11.5–14.5)
GLOBULIN SER CALC-MCNC: 2.9 G/DL (ref 2–4)
GLUCOSE BLD STRIP.AUTO-MCNC: 120 MG/DL (ref 65–100)
GLUCOSE BLD STRIP.AUTO-MCNC: 90 MG/DL (ref 65–100)
GLUCOSE SERPL-MCNC: 94 MG/DL (ref 65–100)
HCT VFR BLD AUTO: 33.5 % (ref 35–47)
HGB BLD-MCNC: 9.7 G/DL (ref 11.5–16)
IMM GRANULOCYTES # BLD: 0.1 K/UL (ref 0–0.04)
IMM GRANULOCYTES NFR BLD AUTO: 1 % (ref 0–0.5)
LYMPHOCYTES # BLD: 1.6 K/UL (ref 0.8–3.5)
LYMPHOCYTES NFR BLD: 26 % (ref 12–49)
MAGNESIUM SERPL-MCNC: 1.6 MG/DL (ref 1.6–2.4)
MCH RBC QN AUTO: 29.5 PG (ref 26–34)
MCHC RBC AUTO-ENTMCNC: 29 G/DL (ref 30–36.5)
MCV RBC AUTO: 101.8 FL (ref 80–99)
MONOCYTES # BLD: 0.7 K/UL (ref 0–1)
MONOCYTES NFR BLD: 12 % (ref 5–13)
NEUTS SEG # BLD: 3.4 K/UL (ref 1.8–8)
NEUTS SEG NFR BLD: 57 % (ref 32–75)
NRBC # BLD: 0 K/UL (ref 0–0.01)
NRBC BLD-RTO: 0 PER 100 WBC
PLATELET # BLD AUTO: 263 K/UL (ref 150–400)
PMV BLD AUTO: 9.7 FL (ref 8.9–12.9)
POTASSIUM SERPL-SCNC: 3.8 MMOL/L (ref 3.5–5.1)
PROT SERPL-MCNC: 5 G/DL (ref 6.4–8.2)
RBC # BLD AUTO: 3.29 M/UL (ref 3.8–5.2)
SERVICE CMNT-IMP: ABNORMAL
SERVICE CMNT-IMP: NORMAL
SODIUM SERPL-SCNC: 145 MMOL/L (ref 136–145)
WBC # BLD AUTO: 6 K/UL (ref 3.6–11)

## 2019-01-08 PROCEDURE — 82962 GLUCOSE BLOOD TEST: CPT

## 2019-01-08 PROCEDURE — A9270 NON-COVERED ITEM OR SERVICE: HCPCS | Performed by: FAMILY MEDICINE

## 2019-01-08 PROCEDURE — 74011250637 HC RX REV CODE- 250/637: Performed by: INTERNAL MEDICINE

## 2019-01-08 PROCEDURE — 36415 COLL VENOUS BLD VENIPUNCTURE: CPT

## 2019-01-08 PROCEDURE — 83735 ASSAY OF MAGNESIUM: CPT

## 2019-01-08 PROCEDURE — 94760 N-INVAS EAR/PLS OXIMETRY 1: CPT

## 2019-01-08 PROCEDURE — 65270000029 HC RM PRIVATE

## 2019-01-08 PROCEDURE — 80053 COMPREHEN METABOLIC PANEL: CPT

## 2019-01-08 PROCEDURE — 99218 HC RM OBSERVATION: CPT

## 2019-01-08 PROCEDURE — 85025 COMPLETE CBC W/AUTO DIFF WBC: CPT

## 2019-01-08 PROCEDURE — 74011250637 HC RX REV CODE- 250/637: Performed by: FAMILY MEDICINE

## 2019-01-08 PROCEDURE — 74011636637 HC RX REV CODE- 636/637: Performed by: FAMILY MEDICINE

## 2019-01-08 RX ORDER — CEFDINIR 300 MG/1
300 CAPSULE ORAL 2 TIMES DAILY
Qty: 10 CAP | Refills: 0 | Status: ON HOLD | OUTPATIENT
Start: 2019-01-08 | End: 2019-04-26

## 2019-01-08 RX ADMIN — Medication 400 MG: at 10:52

## 2019-01-08 RX ADMIN — PREDNISONE 10 MG: 5 TABLET ORAL at 10:52

## 2019-01-08 RX ADMIN — Medication 10 ML: at 06:54

## 2019-01-08 RX ADMIN — CLOPIDOGREL BISULFATE 75 MG: 75 TABLET ORAL at 10:52

## 2019-01-08 RX ADMIN — METOPROLOL TARTRATE 25 MG: 25 TABLET ORAL at 10:52

## 2019-01-08 RX ADMIN — DULOXETINE 60 MG: 30 CAPSULE, DELAYED RELEASE ORAL at 10:52

## 2019-01-08 RX ADMIN — POTASSIUM CHLORIDE 20 MEQ: 750 TABLET, EXTENDED RELEASE ORAL at 10:52

## 2019-01-08 NOTE — PROGRESS NOTES
Kendal and Bahena Care provided. Patient is red and excoriated in the periarea. Patient complains of itching. Patient tolerated procedure well.

## 2019-01-08 NOTE — PROGRESS NOTES
Patient had an uneventful night rested well no c/o pain. Medication compliant, no nausea slept with her c-pap on, field catheter intact and patent to be D/C in the am, Report given to on coming nurse  continuity of care.

## 2019-01-08 NOTE — PROGRESS NOTES
On request of Dr. Tulio Pardo, Prescription for 300mg cefdinir capsules - - Take 1 Capsule two times a day (Dispense 10 tablets) called into [facility] [address] #[phone].

## 2019-01-08 NOTE — ROUTINE PROCESS
..Bedside shift change report given to Efrain Hatch RN (oncoming nurse) by Ani Bravo RN (offgoing nurse). Report included the following information SBAR, Kardex, Intake/Output, MAR, Accordion, Procedure Verification and Quality Measures.

## 2019-01-08 NOTE — PROGRESS NOTES
CM contacted [facility] and spoke with Elder Rocher. [facility] contracts out with [facility] for home health. Referral has been faxed ([phone]). Haris Ortega, DARA

## 2019-01-08 NOTE — PROGRESS NOTES
SURGERY PROGRESS NOTE Admit Date: 2019 POD * No surgery found * Procedure: * No surgery found * Subjective:  
Feels well Ready for d/c home Objective:  
 
Visit Vitals /50 (BP 1 Location: Left arm, BP Patient Position: Head of bed elevated (Comment degrees)) Pulse 98 Temp 98.6 °F (37 °C) Resp 18 Ht 5' 6.5\" (1.689 m) Wt 99.3 kg (219 lb) SpO2 96% BMI 34.82 kg/m² Temp (24hrs), Av.5 °F (36.9 °C), Min:98.2 °F (36.8 °C), Max:98.9 °F (37.2 °C) Physical Exam: Abdomen:  Soft. Non-tender, non-distended. Lab Results Component Value Date/Time WBC 6.0 2019 04:04 AM  
 HGB 9.7 (L) 2019 04:04 AM  
 Hemoglobin (POC) 13.9 10/09/2015 12:27 PM  
 HCT 33.5 (L) 2019 04:04 AM  
 Hematocrit (POC) 31 (L) 2018 02:00 PM  
 PLATELET 091  04:04 AM  
 .8 (H) 2019 04:04 AM  
 
Lab Results Component Value Date/Time GFR est non-AA >60 2019 04:04 AM  
 GFRNA, POC 44 (L) 10/12/2018 04:14 PM  
 GFR est AA >60 2019 04:04 AM  
 GFRAA, POC 54 (L) 10/12/2018 04:14 PM  
 Creatinine 0.90 2019 04:04 AM  
 Creatinine (POC) 1.2 10/12/2018 04:14 PM  
 BUN 5 (L) 2019 04:04 AM  
 BUN (POC) 23 (H) 2018 02:00 PM  
 Sodium 145 2019 04:04 AM  
 Sodium (POC) 137 2018 02:00 PM  
 Potassium 3.8 2019 04:04 AM  
 Potassium (POC) 3.9 2018 02:00 PM  
 Chloride 113 (H) 2019 04:04 AM  
 Chloride (POC) 104 2018 02:00 PM  
 CO2 21 2019 04:04 AM  
 Magnesium 1.6 2019 04:04 AM  
 Phosphorus 3.8 2019 04:36 AM  
 
 
Assessment:  
 
Principal Problem: 
  Partial small bowel obstruction ([facility] (2019) Active Problems: 
  Coronary artery disease involving native coronary artery without angina pectoris (2016) Essential hypertension (2016) Recurrent deep vein thrombosis (DVT) ([facility] (3/30/2018) Type 2 diabetes with nephropathy ([facility] (10/1/2018) Obesity (BMI 30-39.9) (11/13/2018) Sleep apnea (1/5/2019) Overview: wears CPAP Atrial fibrillation ([facility] (11/16/2018) Bowel obstruction ([facility] (1/8/2019) Plan/Recommendations/Medical Decision Making:  
Awaiting discharge Bahena out this am she has yet to void - would ensure that she is able to void prior to d/c home Follow up with general surgery as needed

## 2019-01-08 NOTE — DISCHARGE SUMMARY
Physician Discharge Summary Patient ID:   
Abigail Aguiar [id number] 
40 y.o. 
1945 I[place], [place] 
 
Admit date: 1/5/2019 Discharge date and time: 1/8/2019 Admission Diagnoses: Partial small bowel obstruction ([facility] Bowel obstruction ([facility] Chronic Diagnoses:   
Problem List as of 1/8/2019 Date Reviewed: 11/27/2018 Codes Class Noted - Resolved Bowel obstruction ([facility] ICD-10-CM: L07.048 ICD-9-CM: 560.9  1/8/2019 - Present * (Principal) Partial small bowel obstruction ([facility] ICD-10-CM: K56.600 ICD-9-CM: 560.9  1/5/2019 - Present Sleep apnea ICD-10-CM: G47.30 ICD-9-CM: 780.57  1/5/2019 - Present Overview Signed 1/5/2019  8:41 PM by Keegan Dalal DO  
  wears CPAP Atrial fibrillation [facility]) ICD-10-CM: I48.91 
ICD-9-CM: 427.31  11/16/2018 - Present Dyspnea ICD-10-CM: R06.00 
ICD-9-CM: 786.09  11/13/2018 - Present ARF (acute renal failure) (HCC) ICD-10-CM: N17.9 ICD-9-CM: 584.9  11/13/2018 - Present Obesity (BMI 30-39.9) (Chronic) ICD-10-CM: O66.9 ICD-9-CM: 278.00  11/13/2018 - Present Closed wedge compression fracture of T7 vertebra ([facility] ICD-10-CM: J10.813X ICD-9-CM: 805.2  11/13/2018 - Present Type 2 diabetes with nephropathy ([facility] ICD-10-CM: E11.21 
ICD-9-CM: 250.40, 583.81  10/1/2018 - Present Chest pain ICD-10-CM: R07.9 ICD-9-CM: 786.50  6/27/2018 - Present Generalized abdominal pain ICD-10-CM: R10.84 ICD-9-CM: 789.07  6/27/2018 - Present Recurrent deep vein thrombosis (DVT) (HCC) ICD-10-CM: I82.409 ICD-9-CM: 453.40  3/30/2018 - Present Chronic anticoagulation (Chronic) ICD-10-CM: Z79.01 
ICD-9-CM: V58.61  3/30/2018 - Present Coronary artery disease involving native coronary artery without angina pectoris (Chronic) ICD-10-CM: I25.10 ICD-9-CM: 414.01  1/22/2016 - Present Essential hypertension (Chronic) ICD-10-CM: I10 
ICD-9-CM: 401.9  1/22/2016 - Present Type II diabetes mellitus (HCC) (Chronic) ICD-10-CM: E11.9 ICD-9-CM: 250.00  10/9/2015 - Present NSTEMI (non-ST elevated myocardial infarction) ([facility] ICD-10-CM: I21.4 ICD-9-CM: 410.70  10/9/2015 - Present RESOLVED: CAD (coronary artery disease) ICD-10-CM: I25.10 ICD-9-CM: 414.00  10/9/2015 - 1/22/2016 RESOLVED: HTN (hypertension) ICD-10-CM: I10 
ICD-9-CM: 401.9  10/9/2015 - 1/22/2016 Discharge Medications:  
Current Discharge Medication List  
  
START taking these medications Details  
cefdinir (OMNICEF) 300 mg capsule Take 1 Cap by mouth two (2) times a day. Qty: 10 Cap, Refills: 0 CONTINUE these medications which have NOT CHANGED Details  
psyllium (METAMUCIL) packet Take 1 Packet by mouth daily. metoprolol tartrate (LOPRESSOR) 25 mg tablet Take 25 mg by mouth two (2) times a day. pantoprazole (PROTONIX) 20 mg tablet Take 20 mg by mouth Daily (before dinner). simethicone (MYLICON) 80 mg chewable tablet Take 80 mg by mouth every six (6) hours as needed (gas). loperamide (IMODIUM) 2 mg capsule Take 2 mg by mouth daily as needed for Diarrhea.  
  
magnesium hydroxide (CLARK MILK OF MAGNESIA) 400 mg/5 mL suspension Take 30 mL by mouth two (2) times daily as needed for Constipation. bisacodyl (DULCOLAX) 10 mg suppository Insert 10 mg into rectum daily as needed (constipation). polyethylene glycol (MIRALAX) 17 gram packet Take 17 g by mouth daily as needed. mirtazapine (REMERON) 15 mg tablet Take 15 mg by mouth nightly. predniSONE (DELTASONE) 10 mg tablet Take 10 mg by mouth daily. potassium chloride SR (KLOR-CON 10) 10 mEq tablet Take 10 mEq by mouth two (2) times daily (with meals). calcitonin, salmon, (MIACALCIN) nasal 1 [place] by IntraNASal route daily. Alternating nostril  
  
magnesium oxide (MAG-OX) 400 mg tablet Take 1 Tab by mouth daily. Qty: 30 Tab, Refills: 0 rivaroxaban (XARELTO) 20 mg tab tablet Take 1 Tab by mouth daily (with dinner). Qty: 90 Tab, Refills: 0  
  
ondansetron (ZOFRAN ODT) 4 mg disintegrating tablet Take 1 Tab by mouth every eight (8) hours as needed. Qty: 1 Tab, Refills: 0  
  
tiotropium (SPIRIVA WITH HANDIHALER) 18 mcg inhalation capsule Take 1 Cap by inhalation daily. albuterol (PROVENTIL VENTOLIN) 2.5 mg /3 mL (0.083 %) nebulizer solution 2.5 mg by Nebulization route every six (6) hours as needed for Wheezing or Shortness of Breath.  
  
montelukast (SINGULAIR) 10 mg tablet Take 10 mg by mouth nightly. albuterol (PROAIR HFA) 90 mcg/actuation inhaler Take 1 Puff by inhalation every four (4) hours as needed. clopidogrel (PLAVIX) 75 mg tab Take 75 mg by mouth daily. lactobacillus rhamnosus gg 10 billion cell (CULTURELLE) 10 billion cell capsule Take 1 Cap by mouth daily. acetaminophen (TYLENOL) 325 mg tablet Take 650 mg by mouth every six (6) hours as needed for Pain. biotin 10,000 mcg cap Take 1 Cap by mouth daily. saxagliptin (ONGLYZA) 5 mg tab tablet Take 5 mg by mouth daily. Takes at [employer] DULoxetine (CYMBALTA) 60 mg capsule Take 60 mg by mouth daily. cholecalciferol (VITAMIN D3) 1,000 unit tablet Take 1,000 Units by mouth daily. azelastine-fluticasone (DYMISTA) 137-50 mcg/spray spry 1 Spray by Nasal route two (2) times a day. mometasone-formoterol (DULERA) 200-5 mcg/actuation HFA inhaler Take 2 Puffs by inhalation two (2) times a day. Follow up Care: 1. Lee Ann Payne MD with in 1 weeks 2.  specialists as directed. Diet:  Diabetic Diet Disposition: 
Home. Advanced Directive: 
Discharge Exam: [See today's progress note.] CONSULTATIONS: None Significant Diagnostic Studies:  
Recent Labs 01/08/19 
0404 01/07/19 
1425 WBC 6.0 6.8 HGB 9.7* 10.6* HCT 33.5* 35.9  322 Recent Labs 01/08/19 
0404 01/07/19 
1425 01/06/19 
0436  142 142 K 3.8 3.2* 3.0*  
* 111* 110* CO2 21 18* 19* BUN 5* 8 10 CREA 0.90 1.04* 0.97 GLU 94 135* 100 CA 8.1* 7.8* 8.1*  
MG 1.6 1.8 1.2*  
PHOS  --   --  3.8 Recent Labs 01/08/19 
0404 01/07/19 
1425 01/06/19 
0436 SGOT 43* 48* 65* ALT 53 63 74 * 164* 158* TBILI 0.5 0.5 0.7 TP 5.0* 5.4* 5.3* ALB 2.1* 2.3* 2.3*  
GLOB 2.9 3.1 3.0 Lab Results Component Value Date/Time TSH 0.58 11/13/2018 03:26 PM  
 
HOSPITAL COURSE & TREATMENT RENDERED:  
1. See today's progress note: 
 
Daily Progress Note and Discharge Note: 1/8/2019 German Torres MD 
  
   
Assessment/Plan:  
Partial small bowel obstruction. --General surgery consult.  
--NPO initially, clears to solid on 1/6, ADAT  
--Anti-emetics and pain control.  
  
Sinus tachycardia. resolved. -- restarted coreg 
  
Coronary artery disease involving native coronary artery without angina pectoris. --resumed cardiac meds 
  
Essential hypertension (1/22/2016). --resumed oral anti-hypertensives.  
  
Recurrent deep vein thrombosis /  Atrial fibrillation. --resumed xarelto --Restarted coreg  
  
Type 2 diabetes with nephropathy ([facility] (10/1/2018). Does not appear to be on insulin. Hold oral meds. --Had a1c within past 3 mo (8.5%) --resume oral meds 
  
Obesity (BMI 30-39.9) (11/13/2018). Would benefit from weight loss 
  
Sleep apnea. CPAP qhs 
  
Electrolyte Imbalance 
--Repleted and Monitor outpt 
  
Urinary Retention 
--field DC and monitor/further w/u outpt  
   
Subjective:  
 72 y.o.   female with a hx of Afib, CAD, HAYES, HTN, DM2, recurrent DVT who presented to the Emergency Department complaining of 1 day of diarrhea. Patient reports onset of diarrhea yesterday, apparently continued throughout the day, associated with nausea, anorexia, vague/diffuse abd tenderness that did not improve with imodium last night. This AM, had last episode of diarrhea as well as an episode of vomiting. Has been unable to take any PO at all today and came to ED. In ED, CT scan showed early pSBO. We will admit in concert with our general surgery colleagues. ( Dr Manjula Soto) 
  
: Feeling better this am. Less pain. No nausea. K+ and Mg low so will replete. Decrease IVF and recheck abd Xray. Surgery c/s pending. She is on Prednisone for TA. Will restart if she is improving and no surgical intervention needed.  
  
:  No further N/V and tolerated diet last night. Urinary retention last night and needed field. AM labs pending. 
  
:   No further N/V. Will DC field and if ok then home later today on Omnicef for 3 days. Follow up with Dr Tray Patel 2-3 days. Diet/wt loss recommended and to get A1C down to 7.5 - 8.0 range. 1pm: able to pass urine without diff - DC today.   
  
Review of Systems: A comprehensive review of systems was negative except for that written in the HPI. 
  
Objective:  
Physical Exam:  
Visit Vitals /43 (BP 1 Location: Left arm, BP Patient Position: At rest;Supine) Pulse 88 Temp 98.2 °F (36.8 °C) Resp 17 Ht 5' 6.5\" (1.689 m) Wt 99.3 kg (219 lb) SpO2 94% BMI 34.82 kg/m² O2 Device: Room air Temp (24hrs), Av.3 °F (36.8 °C), Min:98.2 °F (36.8 °C), Max:98.6 °F (37 °C) 
  1901 -  07 In: -  
Out: 450 [Urine:450]   701 - 1900 In: 12 [P.O.:300; I.V.:500] Out: 1125 [Urine:725] General:  Alert, cooperative, no distress, appears stated age. Obese Head:  Normocephalic, without obvious abnormality, atraumatic. Eyes:  Conjunctivae/corneas clear. PERRL, EOMs intact. Nose: Nares normal. Septum midline. Mucosa normal. No drainage or sinus tenderness. Throat: Lips, mucosa, and tongue moist.. Neck: Supple, symmetrical, trachea midline, no adenopathy, thyroid: no enlargement/tenderness/nodules, no carotid bruit and no JVD. Back:   Symmetric, no curvature. ROM normal. No CVA tenderness. Lungs:   Clear to auscultation bilaterally. Chest wall:  No tenderness or deformity. Heart:  Irregular rate and rhythm, S1, S2 normal, no murmur, click, rub or gallop. Abdomen:   Soft, no tenderness RLQ and LLQ. Bowel sounds diminished. No masses,  No organomegaly. Extremities: no cyanosis or edema. No calf tenderness or cords. Pulses: 2+ and symmetric all extremities. Skin: Skin color, texture, turgor normal. No rashes or lesions Neurologic: CNII-XII intact. Alert and oriented X 3. Fine motor of hands and fingers normal.   equal.  No cogwheeling or rigidity. Gait not tested at this time. Sensation grossly normal to touch.   Gross motor of extremities normal.    
 
Signed: 
Dilma Burk MD 
1/8/2019 
1:07 PM

## 2019-01-08 NOTE — PROGRESS NOTES
Physical Therapy 0 Pt received in bed,ine family member at bedside. Pt  anticipating discharge and declining OOB activity at this time. Yanet Mora

## 2019-01-08 NOTE — PROGRESS NOTES
Daily Progress Note and Discharge Note: 1/8/2019 Zettie Head Marzena Aquino MD 
 
Assessment/Plan:  
Partial small bowel obstruction. --General surgery consult.  
--NPO initially, clears to solid on 1/6, ADAT  
--Anti-emetics and pain control.  
  
Sinus tachycardia. resolved. -- restarted coreg 
  
Coronary artery disease involving native coronary artery without angina pectoris. --resumed cardiac meds 
  
Essential hypertension (1/22/2016). --resumed oral anti-hypertensives.  
  
Recurrent deep vein thrombosis /  Atrial fibrillation. --resumed xarelto --Restarted coreg  
  
Type 2 diabetes with nephropathy ([facility] (10/1/2018). Does not appear to be on insulin. Hold oral meds. --Had a1c within past 3 mo (8.5%) --resume oral meds 
  
Obesity (BMI 30-39.9) (11/13/2018). Would benefit from weight loss 
  
Sleep apnea. CPAP qhs 
  
Electrolyte Imbalance 
--Repleted and Monitor outpt Urinary Retention 
--field DC and monitor/further w/u outpt Problem List: 
Problem List as of 1/8/2019 Date Reviewed: 11/27/2018 Codes Class Noted - Resolved * (Principal) Partial small bowel obstruction ([facility] [address] ICD-10-CM: K56.600 ICD-9-CM: 560.9  1/5/2019 - Present Sleep apnea ICD-10-CM: G47.30 ICD-9-CM: 780.57  1/5/2019 - Present Overview Signed 1/5/2019  8:41 PM by Nestor Otero DO  
  wears CPAP Atrial fibrillation [facility]) ICD-10-CM: I48.91 
ICD-9-CM: 427.31  11/16/2018 - Present Dyspnea ICD-10-CM: R06.00 
ICD-9-CM: 786.09  11/13/2018 - Present ARF (acute renal failure) (HCC) ICD-10-CM: N17.9 ICD-9-CM: 584.9  11/13/2018 - Present Obesity (BMI 30-39.9) (Chronic) ICD-10-CM: J49.7 ICD-9-CM: 278.00  11/13/2018 - Present Closed wedge compression fracture of T7 vertebra ([address] ICD-10-CM: Z04.016O ICD-9-CM: 805.2  11/13/2018 - Present  Type 2 diabetes with nephropathy (HCC) ICD-10-CM: E11.21 
 ICD-9-CM: 250.40, 583.81  10/1/2018 - Present Chest pain ICD-10-CM: R07.9 ICD-9-CM: 786.50  6/27/2018 - Present Generalized abdominal pain ICD-10-CM: R10.84 ICD-9-CM: 789.07  6/27/2018 - Present Recurrent deep vein thrombosis (DVT) (HCC) ICD-10-CM: I82.409 ICD-9-CM: 453.40  3/30/2018 - Present Chronic anticoagulation (Chronic) ICD-10-CM: Z79.01 
ICD-9-CM: V58.61  3/30/2018 - Present Coronary artery disease involving native coronary artery without angina pectoris (Chronic) ICD-10-CM: I25.10 ICD-9-CM: 414.01  1/22/2016 - Present Essential hypertension (Chronic) ICD-10-CM: I10 
ICD-9-CM: 401.9  1/22/2016 - Present Type II diabetes mellitus (HCC) (Chronic) ICD-10-CM: E11.9 ICD-9-CM: 250.00  10/9/2015 - Present NSTEMI (non-ST elevated myocardial infarction) ([facility] [address] ICD-10-CM: I21.4 ICD-9-CM: 410.70  10/9/2015 - Present RESOLVED: CAD (coronary artery disease) ICD-10-CM: I25.10 ICD-9-CM: 414.00  10/9/2015 - 1/22/2016 RESOLVED: HTN (hypertension) ICD-10-CM: I10 
ICD-9-CM: 401.9  10/9/2015 - 1/22/2016 Subjective:  
 72 y.o.  female with a hx of Afib, CAD, HAYES, HTN, DM2, recurrent DVT who presented to the Emergency Department complaining of 1 day of diarrhea. Patient reports onset of diarrhea yesterday, apparently continued throughout the day, associated with nausea, anorexia, vague/diffuse abd tenderness that did not improve with imodium last night. This AM, had last episode of diarrhea as well as an episode of vomiting. Has been unable to take any PO at all today and came to ED. In ED, CT scan showed early pSBO. We will admit in concert with our general surgery colleagues. ( Dr Justyna Pepe) 
  
1/6: Feeling better this am. Less pain. No nausea. K+ and Mg low so will replete. Decrease IVF and recheck abd Xray. Surgery c/s pending. She is on Prednisone for TA. Will restart if she is improving and no surgical intervention needed. :  No further N/V and tolerated diet last night. Urinary retention last night and needed field. AM labs pending. :   No further N/V. Will DC field and if ok then home later today on Omnicef for 3 days. Follow up with Dr Alexandr Ramirez 2-3 days. Diet/wt loss recommended and to get A1C down to 7.5 - 8.0 range. 1pm: able to pass urine without diff - DC today. Review of Systems: A comprehensive review of systems was negative except for that written in the HPI. Objective:  
Physical Exam:  
Visit Vitals /43 (BP 1 Location: Left arm, BP Patient Position: At rest;Supine) Pulse 88 Temp 98.2 °F (36.8 °C) Resp 17 Ht 5' 6.5\" (1.689 m) Wt 99.3 kg (219 lb) SpO2 94% BMI 34.82 kg/m² O2 Device: Room air Temp (24hrs), Av.3 °F (36.8 °C), Min:98.2 °F (36.8 °C), Max:98.6 °F (37 °C) 
  1901 -  0700 In: -  
Out: 450 [Urine:450]   701 -  190 In: 12 [P.O.:300; I.V.:500] Out: 1125 [Urine:725] General:  Alert, cooperative, no distress, appears stated age. Obese Head:  Normocephalic, without obvious abnormality, atraumatic. Eyes:  Conjunctivae/corneas clear. PERRL, EOMs intact. Nose: Nares normal. Septum midline. Mucosa normal. No drainage or sinus tenderness. Throat: Lips, mucosa, and tongue moist.. Neck: Supple, symmetrical, trachea midline, no adenopathy, thyroid: no enlargement/tenderness/nodules, no carotid bruit and no JVD. Back:   Symmetric, no curvature. ROM normal. No CVA tenderness. Lungs:   Clear to auscultation bilaterally. Chest wall:  No tenderness or deformity. Heart:  Irregular rate and rhythm, S1, S2 normal, no murmur, click, rub or gallop. Abdomen:   Soft, no tenderness RLQ and LLQ. Bowel sounds diminished. No masses,  No organomegaly. Extremities: no cyanosis or edema. No calf tenderness or cords. Pulses: 2+ and symmetric all extremities. Skin: Skin color, texture, turgor normal. No rashes or lesions Neurologic: CNII-XII intact. Alert and oriented X 3. Fine motor of hands and fingers normal.   equal.  No cogwheeling or rigidity. Gait not tested at this time. Sensation grossly normal to touch. Gross motor of extremities normal.    
 
Data Review:  
   
Recent Days: 
Recent Labs 01/08/19 
0404 01/07/19 
1425 01/06/19 
0436 WBC 6.0 6.8 7.7 HGB 9.7* 10.6* 10.4* HCT 33.5* 35.9 33.9*  
 322 296 Recent Labs 01/08/19 
0404 01/07/19 
1425 01/06/19 
0436  142 142  
K 3.8 3.2* 3.0*  
* 111* 110* CO2 21 18* 19* GLU 94 135* 100 BUN 5* 8 10 CREA 0.90 1.04* 0.97 CA 8.1* 7.8* 8.1*  
MG 1.6 1.8 1.2*  
PHOS  --   --  3.8 ALB 2.1* 2.3* 2.3* TBILI 0.5 0.5 0.7 SGOT 43* 48* 65* ALT 53 63 74 No results for input(s): PH, PCO2, PO2, HCO3, FIO2 in the last 72 hours. 24 Hour Results: 
Recent Results (from the past 24 hour(s)) GLUCOSE, POC Collection Time: 01/07/19  6:50 AM  
Result Value Ref Range Glucose (POC) 106 (H) 65 - 100 mg/dL Performed by Refugio Reyes (PCT) GLUCOSE, POC Collection Time: 01/07/19 11:26 AM  
Result Value Ref Range Glucose (POC) 155 (H) 65 - 100 mg/dL Performed by Sonali Longo (PCT) MAGNESIUM Collection Time: 01/07/19  2:25 PM  
Result Value Ref Range Magnesium 1.8 1.6 - 2.4 mg/dL METABOLIC PANEL, COMPREHENSIVE Collection Time: 01/07/19  2:25 PM  
Result Value Ref Range Sodium 142 136 - 145 mmol/L Potassium 3.2 (L) 3.5 - 5.1 mmol/L Chloride 111 (H) 97 - 108 mmol/L  
 CO2 18 (L) 21 - 32 mmol/L Anion gap 13 5 - 15 mmol/L Glucose 135 (H) 65 - 100 mg/dL BUN 8 6 - 20 MG/DL Creatinine 1.04 (H) 0.55 - 1.02 MG/DL  
 BUN/Creatinine ratio 8 (L) 12 - 20 GFR est AA >60 >60 ml/min/1.73m2 GFR est non-AA 52 (L) >60 ml/min/1.73m2 Calcium 7.8 (L) 8.5 - 10.1 MG/DL  Bilirubin, total 0.5 0.2 - 1.0 MG/DL  
 ALT (SGPT) 63 12 - 78 U/L  
 AST (SGOT) 48 (H) 15 - 37 U/L  
 Alk. phosphatase 164 (H) 45 - 117 U/L Protein, total 5.4 (L) 6.4 - 8.2 g/dL Albumin 2.3 (L) 3.5 - 5.0 g/dL Globulin 3.1 2.0 - 4.0 g/dL A-G Ratio 0.7 (L) 1.1 - 2.2    
CBC WITH AUTOMATED DIFF Collection Time: 01/07/19  2:25 PM  
Result Value Ref Range WBC 6.8 3.6 - 11.0 K/uL  
 RBC 3.56 (L) 3.80 - 5.20 M/uL  
 HGB 10.6 (L) 11.5 - 16.0 g/dL HCT 35.9 35.0 - 47.0 % .8 (H) 80.0 - 99.0 FL  
 MCH 29.8 26.0 - 34.0 PG  
 MCHC 29.5 (L) 30.0 - 36.5 g/dL  
 RDW 16.1 (H) 11.5 - 14.5 % PLATELET 563 271 - 206 K/uL MPV 9.6 8.9 - 12.9 FL  
 NRBC 0.0 0  WBC ABSOLUTE NRBC 0.00 0.00 - 0.01 K/uL NEUTROPHILS 66 32 - 75 % LYMPHOCYTES 21 12 - 49 % MONOCYTES 9 5 - 13 % EOSINOPHILS 4 0 - 7 % BASOPHILS 1 0 - 1 % IMMATURE GRANULOCYTES 1 (H) 0.0 - 0.5 % ABS. NEUTROPHILS 4.5 1.8 - 8.0 K/UL  
 ABS. LYMPHOCYTES 1.4 0.8 - 3.5 K/UL  
 ABS. MONOCYTES 0.6 0.0 - 1.0 K/UL  
 ABS. EOSINOPHILS 0.3 0.0 - 0.4 K/UL  
 ABS. BASOPHILS 0.0 0.0 - 0.1 K/UL  
 ABS. IMM. GRANS. 0.0 0.00 - 0.04 K/UL  
 DF AUTOMATED    
GLUCOSE, POC Collection Time: 01/07/19  4:21 PM  
Result Value Ref Range Glucose (POC) 147 (H) 65 - 100 mg/dL Performed by Traci Sun (PCT) GLUCOSE, POC Collection Time: 01/07/19  9:19 PM  
Result Value Ref Range Glucose (POC) 128 (H) 65 - 100 mg/dL Performed by Traci Sun (PCT) MAGNESIUM Collection Time: 01/08/19  4:04 AM  
Result Value Ref Range Magnesium 1.6 1.6 - 2.4 mg/dL METABOLIC PANEL, COMPREHENSIVE Collection Time: 01/08/19  4:04 AM  
Result Value Ref Range Sodium 145 136 - 145 mmol/L Potassium 3.8 3.5 - 5.1 mmol/L Chloride 113 (H) 97 - 108 mmol/L  
 CO2 21 21 - 32 mmol/L Anion gap 11 5 - 15 mmol/L Glucose 94 65 - 100 mg/dL BUN 5 (L) 6 - 20 MG/DL Creatinine 0.90 0.55 - 1.02 MG/DL  
 BUN/Creatinine ratio 6 (L) 12 - 20 GFR est AA >60 >60 ml/min/1.73m2 GFR est non-AA >60 >60 ml/min/1.73m2 Calcium 8.1 (L) 8.5 - 10.1 MG/DL Bilirubin, total 0.5 0.2 - 1.0 MG/DL  
 ALT (SGPT) 53 12 - 78 U/L  
 AST (SGOT) 43 (H) 15 - 37 U/L Alk. phosphatase 144 (H) 45 - 117 U/L Protein, total 5.0 (L) 6.4 - 8.2 g/dL Albumin 2.1 (L) 3.5 - 5.0 g/dL Globulin 2.9 2.0 - 4.0 g/dL A-G Ratio 0.7 (L) 1.1 - 2.2    
CBC WITH AUTOMATED DIFF Collection Time: 01/08/19  4:04 AM  
Result Value Ref Range WBC 6.0 3.6 - 11.0 K/uL  
 RBC 3.29 (L) 3.80 - 5.20 M/uL HGB 9.7 (L) 11.5 - 16.0 g/dL HCT 33.5 (L) 35.0 - 47.0 % .8 (H) 80.0 - 99.0 FL  
 MCH 29.5 26.0 - 34.0 PG  
 MCHC 29.0 (L) 30.0 - 36.5 g/dL  
 RDW 16.2 (H) 11.5 - 14.5 % PLATELET 320 001 - 153 K/uL MPV 9.7 8.9 - 12.9 FL  
 NRBC 0.0 0  WBC ABSOLUTE NRBC 0.00 0.00 - 0.01 K/uL NEUTROPHILS 57 32 - 75 % LYMPHOCYTES 26 12 - 49 % MONOCYTES 12 5 - 13 % EOSINOPHILS 4 0 - 7 % BASOPHILS 1 0 - 1 % IMMATURE GRANULOCYTES 1 (H) 0.0 - 0.5 % ABS. NEUTROPHILS 3.4 1.8 - 8.0 K/UL  
 ABS. LYMPHOCYTES 1.6 0.8 - 3.5 K/UL  
 ABS. MONOCYTES 0.7 0.0 - 1.0 K/UL  
 ABS. EOSINOPHILS 0.2 0.0 - 0.4 K/UL  
 ABS. BASOPHILS 0.0 0.0 - 0.1 K/UL  
 ABS. IMM. GRANS. 0.1 (H) 0.00 - 0.04 K/UL  
 DF AUTOMATED Medications reviewed Current Facility-Administered Medications Medication Dose Route Frequency  clopidogrel (PLAVIX) tablet 75 mg  75 mg Oral DAILY  DULoxetine (CYMBALTA) capsule 60 mg  60 mg Oral DAILY  magnesium oxide (MAG-OX) tablet 400 mg  400 mg Oral DAILY  rivaroxaban (XARELTO) tablet 20 mg  20 mg Oral DAILY WITH DINNER  
 cefTRIAXone (ROCEPHIN) 1 g in 0.9% sodium chloride (MBP/ADV) 50 mL  1 g IntraVENous Q24H  predniSONE (DELTASONE) tablet 10 mg  10 mg Oral DAILY  potassium chloride SR (KLOR-CON 10) tablet 20 mEq  20 mEq Oral BID  sodium chloride (NS) flush 5-10 mL  5-10 mL IntraVENous Q8H  
 sodium chloride (NS) flush 5-10 mL  5-10 mL IntraVENous PRN  
  morphine injection 2 mg  2 mg IntraVENous Q4H PRN  
 naloxone (NARCAN) injection 0.4 mg  0.4 mg IntraVENous PRN  
 ondansetron (ZOFRAN ODT) tablet 4 mg  4 mg Oral Q6H PRN  
 hydrALAZINE (APRESOLINE) 20 mg/mL injection 20 mg  20 mg IntraVENous Q6H PRN  
 glucose chewable tablet 16 g  4 Tab Oral PRN  
 dextrose (D50W) injection syrg 12.5-25 g  25-50 mL IntraVENous PRN  
 glucagon (GLUCAGEN) injection 1 mg  1 mg IntraMUSCular PRN  
 insulin lispro (HUMALOG) injection   SubCUTAneous AC&HS  
 metoprolol tartrate (LOPRESSOR) tablet 25 mg  25 mg Oral BID  mirtazapine (REMERON) tablet 15 mg  15 mg Oral QHS  pantoprazole (PROTONIX) tablet 40 mg  40 mg Oral ACD Care Plan discussed with: Patient and Nurse Total time spent with patient: 30 minutes.  
Ashia Trimble MD

## 2019-01-08 NOTE — DISCHARGE INSTRUCTIONS
Patient Discharge Instructions    Elida Greer / [id number] : 1945    Admitted 2019 Discharged: 2019 1:06 PM     ACUTE DIAGNOSES:  Partial small bowel obstruction (HCC)  Bowel obstruction ([facility]    CHRONIC MEDICAL DIAGNOSES:  Problem List as of 2019 Date Reviewed: 2018          Codes Class Noted - Resolved    Bowel obstruction ([facility] ICD-10-CM: G77.037  ICD-9-CM: 560.9  2019 - Present        * (Principal) Partial small bowel obstruction ([facility] ICD-10-CM: K56.600  ICD-9-CM: 560.9  2019 - Present        Sleep apnea ICD-10-CM: G47.30  ICD-9-CM: 780.57  2019 - Present    Overview Signed 2019  8:41 PM by Sudhir Pelaez DO     wears CPAP             Atrial fibrillation ([facility] ICD-10-CM: I48.91  ICD-9-CM: 427.31  2018 - Present        Dyspnea ICD-10-CM: R06.00  ICD-9-CM: 786.09  2018 - Present        ARF (acute renal failure) (HCC) ICD-10-CM: N17.9  ICD-9-CM: 584.9  2018 - Present        Obesity (BMI 30-39.9) (Chronic) ICD-10-CM: E66.9  ICD-9-CM: 278.00  2018 - Present        Closed wedge compression fracture of T7 vertebra ([facility] ICD-10-CM: O83.315N  ICD-9-CM: 805.2  2018 - Present        Type 2 diabetes with nephropathy ([facility] ICD-10-CM: E11.21  ICD-9-CM: 250.40, 583.81  10/1/2018 - Present        Chest pain ICD-10-CM: R07.9  ICD-9-CM: 786.50  2018 - Present        Generalized abdominal pain ICD-10-CM: R10.84  ICD-9-CM: 789.07  2018 - Present        Recurrent deep vein thrombosis (DVT) ([facility] ICD-10-CM: I82.409  ICD-9-CM: 453.40  3/30/2018 - Present        Chronic anticoagulation (Chronic) ICD-10-CM: Z79.01  ICD-9-CM: V58.61  3/30/2018 - Present        Coronary artery disease involving native coronary artery without angina pectoris (Chronic) ICD-10-CM: I25.10  ICD-9-CM: 414.01  2016 - Present        Essential hypertension (Chronic) ICD-10-CM: I10  ICD-9-CM: 401.9  2016 - Present        Type II diabetes mellitus ([facility] (Chronic) ICD-10-CM: E11.9  ICD-9-CM: 250.00  10/9/2015 - Present        NSTEMI (non-ST elevated myocardial infarction) ([facility] [address] ICD-10-CM: I21.4  ICD-9-CM: 410.70  10/9/2015 - Present        RESOLVED: CAD (coronary artery disease) ICD-10-CM: I25.10  ICD-9-CM: 414.00  10/9/2015 - 1/22/2016        RESOLVED: HTN (hypertension) ICD-10-CM: I10  ICD-9-CM: 401.9  10/9/2015 - 1/22/2016              DISCHARGE MEDICATIONS:   {Medication reconciliation information is now added to the patient's AVS automatically when it is printed. There is no need to use this SmartLink in discharge instructions. Highlight this text and delete it to clear this message}      · It is important that you take the medication exactly as they are prescribed. · Keep your medication in the bottles provided by the pharmacist and keep a list of the medication names, dosages, and times to be taken in your wallet. · Do not take other medications without consulting your doctor. DIET:  {diet:15488}    ACTIVITY: {discharge activity:97724}    ADDITIONAL INFORMATION: If you experience any of the following symptoms then please call your primary care physician or return to the emergency room if you cannot get hold of your doctor: Fever, chills, nausea, vomiting, diarrhea, change in mentation, falling, bleeding, shortness of breath. FOLLOW UP CARE:  Dr. Marzena Aquino MD  you are to call and set up an appointment to see them with in 1 week. Follow-up with specialists at directed by them      Information obtained by :  I understand that if any problems occur once I am at home I am to contact my physician. I understand and acknowledge receipt of the instructions indicated above.                                                                                                                                            Physician's or R.N.'s Signature                                                                  Date/Time Patient or Representative Signature                                                          Date/Time

## 2019-01-08 NOTE — PROGRESS NOTES
Pt reports that three medications on her discharge list were started at [facility] and she never received prescriptions for these medications: Metoprolol, Remeron and Spiriva. Discussed with Dr. Olvin Roberto - he requested that all three be called into the pharmacy as he does not have access to a computer to place the orders. 25mg metoprolol tartrate 25mg tablet - Take 25mg by mouth two times daily #60, no refills. 15mg mirtazapine tablet - Take 15mg by mouth nightly #30, no refills. Tiotropium (Spiriva with Handihaler) 18mcg inhalation capsule - Take 1 Cap by inhalation daily #30, no refills. All called to the pharmacy at [place] - Phone #[phone].

## 2019-03-25 ENCOUNTER — HOSPITAL ENCOUNTER (EMERGENCY)
Age: 74
Discharge: HOME | End: 2019-03-25
Attending: EMERGENCY MEDICINE
Payer: MEDICARE

## 2019-03-25 VITALS
TEMPERATURE: 98.2 F | HEART RATE: 124 BPM | HEIGHT: 66 IN | BODY MASS INDEX: 38.57 KG/M2 | WEIGHT: 240 LBS | OXYGEN SATURATION: 95 % | RESPIRATION RATE: 22 BRPM | DIASTOLIC BLOOD PRESSURE: 84 MMHG | SYSTOLIC BLOOD PRESSURE: 163 MMHG

## 2019-03-25 DIAGNOSIS — S81.812A LACERATION OF LEFT LOWER LEG, INITIAL ENCOUNTER: Primary | ICD-10-CM

## 2019-03-25 PROCEDURE — 77030018836 HC SOL IRR NACL ICUM -A

## 2019-03-25 PROCEDURE — 74011000250 HC RX REV CODE- 250: Performed by: PHYSICIAN ASSISTANT

## 2019-03-25 PROCEDURE — 75810000294 HC INTERM/LAYERED WND RPR

## 2019-03-25 PROCEDURE — 74011250636 HC RX REV CODE- 250/636: Performed by: PHYSICIAN ASSISTANT

## 2019-03-25 PROCEDURE — 99283 EMERGENCY DEPT VISIT LOW MDM: CPT

## 2019-03-25 PROCEDURE — 90471 IMMUNIZATION ADMIN: CPT

## 2019-03-25 PROCEDURE — 90715 TDAP VACCINE 7 YRS/> IM: CPT | Performed by: PHYSICIAN ASSISTANT

## 2019-03-25 PROCEDURE — 77030031139 HC SUT VCRL2 J&J -A

## 2019-03-25 RX ORDER — LIDOCAINE HYDROCHLORIDE AND EPINEPHRINE 10; 10 MG/ML; UG/ML
1.5 INJECTION, SOLUTION INFILTRATION; PERINEURAL
Status: COMPLETED | OUTPATIENT
Start: 2019-03-25 | End: 2019-03-25

## 2019-03-25 RX ADMIN — TETANUS TOXOID, REDUCED DIPHTHERIA TOXOID AND ACELLULAR PERTUSSIS VACCINE, ADSORBED 0.5 ML: 5; 2.5; 8; 8; 2.5 SUSPENSION INTRAMUSCULAR at 14:39

## 2019-03-25 RX ADMIN — LIDOCAINE HYDROCHLORIDE,EPINEPHRINE BITARTRATE 15 MG: 10; .01 INJECTION, SOLUTION INFILTRATION; PERINEURAL at 14:34

## 2019-03-25 NOTE — DISCHARGE INSTRUCTIONS
We hope that we have addressed all of your medical concerns. The examination and treatment you received in the Emergency Department were for an emergent problem and were not intended as complete care. It is important that you follow up with your healthcare provider(s) for ongoing care. If your symptoms worsen or do not improve as expected, and you are unable to reach your usual health care provider(s), you should return to the Emergency Department. Today's healthcare is undergoing tremendous change, and patient satisfaction surveys are one of the many tools to assess the quality of medical care. You may receive a survey from the [employer] regarding your experience in the Emergency Department. I hope that your experience has been completely positive, particularly the medical care that I provided. As such, please participate in the survey; anything less than excellent does not meet my expectations or intentions. Formerly [facility] and [address] [place] [address] participate in nationally recognized quality of care measures. If your blood pressure is greater than 120/80, as reported below, we urge that you seek medical care to address the potential of high blood pressure, commonly known as hypertension. Hypertension can be hereditary or can be caused by certain medical conditions, pain, stress, or \"white coat syndrome. \"       Please make an appointment with your health care provider(s) for follow up of your Emergency Department visit. VITALS:   Patient Vitals for the past 8 hrs:   Temp Pulse Resp BP SpO2   03/25/19 1427 -- -- -- -- 95 %   03/25/19 1412 98.2 °F (36.8 °C) (!) 124 22 163/84 95 %          Thank you for allowing us to provide you with medical care today. We realize that you have many choices for your emergency care needs. Please choose us in the future for any continued health care needs. Teena Spencer, TATE MALDONADO [facility]: [phone]            No results found for this or any previous visit (from the past 24 hour(s)). No results found. Patient Education        Cuts: Care Instructions  Your Care Instructions  A cut can happen anywhere on your body. Stitches, staples, skin adhesives, or pieces of tape called Steri-Strips are sometimes used to keep the edges of a cut together and help it heal. Steri-Strips can be used by themselves or with stitches or staples. Sometimes cuts are left open. If the cut went deep and through the skin, the doctor may have closed the cut in two layers. A deeper layer of stitches brings the deep part of the cut together. These stitches will dissolve and don't need to be removed. The upper layer closure, which could be stitches, staples, Steri-Strips, or adhesive, is what you see on the cut. A cut is often covered by a bandage. The doctor has checked you carefully, but problems can develop later. If you notice any problems or new symptoms, get medical treatment right away. Follow-up care is a key part of your treatment and safety. Be sure to make and go to all appointments, and call your doctor if you are having problems. It's also a good idea to know your test results and keep a list of the medicines you take. How can you care for yourself at home? If a cut is open or closed  · Prop up the sore area on a pillow anytime you sit or lie down during the next 3 days. Try to keep it above the level of your heart. This will help reduce swelling. · Keep the cut dry for the first 24 to 48 hours. After this, you can shower if your doctor okays it. Pat the cut dry. · Don't soak the cut, such as in a bathtub. Your doctor will tell you when it's safe to get the cut wet. · After the first 24 to 48 hours, clean the cut with soap and water 2 times a day unless your doctor gives you different instructions.   ? Don't use hydrogen peroxide or alcohol, which can slow healing. ? You may cover the cut with a thin layer of petroleum jelly and a nonstick bandage. ? If the doctor put a bandage over the cut, put on a new bandage after cleaning the cut or if the bandage gets wet or dirty. · Avoid any activity that could cause your cut to reopen. · Be safe with medicines. Read and follow all instructions on the label. ? If the doctor gave you a prescription medicine for pain, take it as prescribed. ? If you are not taking a prescription pain medicine, ask your doctor if you can take an over-the-counter medicine. If the cut is closed with stitches, staples, or Steri-Strips  · Follow the above instructions for open or closed cuts. · Do not remove the stitches or staples on your own. Your doctor will tell you when to come back to have the stitches or staples removed. · Leave Steri-Strips on until they fall off. If the cut is closed with a skin adhesive  · Follow the above instructions for open or closed cuts. · Leave the skin adhesive on your skin until it falls off on its own. This may take 5 to 10 days. · Do not scratch, rub, or pick at the adhesive. · Do not put the sticky part of a bandage directly on the adhesive. · Do not put any kind of ointment, cream, or lotion over the area. This can make the adhesive fall off too soon. Do not use hydrogen peroxide or alcohol, which can slow healing. When should you call for help? Call your doctor now or seek immediate medical care if:    · You have new pain, or your pain gets worse.     · The skin near the cut is cold or pale or changes color.     · You have tingling, weakness, or numbness near the cut.     · The cut starts to bleed, and blood soaks through the bandage. Oozing small amounts of blood is normal.     · You have trouble moving the area near the cut.     · You have symptoms of infection, such as:  ? Increased pain, swelling, warmth, or redness around the cut.  ? Red streaks leading from the cut.  ?  Pus draining from the cut.  ? A fever.    Watch closely for changes in your health, and be sure to contact your doctor if:    · The cut reopens.     · You do not get better as expected. Where can you learn more? Go to http://eloina-sherman.info/. Enter M735 in the search box to learn more about \"Cuts: Care Instructions. \"  Current as of: September 23, 2018  Content Version: 11.9  © 2006-2018 [employer]. Care instructions adapted under license by [employer] (which disclaims liability or warranty for this information). If you have questions about a medical condition or this instruction, always ask your healthcare professional. [address] any warranty or liability for your use of this information. Patient Education        Cuts Closed With Adhesives: Care Instructions  Your Care Instructions  A cut can happen anywhere on your body. The doctor used an adhesive to close the cut. When the adhesive dries, it forms a film that holds the edges of the cut together. Skin adhesives are sometimes called liquid stitches. If the cut went deep and through the skin, the doctor may have put in a layer of stitches below the adhesive. The deeper layer of stitches brings the deep part of the cut together. These stitches will dissolve and don't need to be removed. You don't see the stitches, only the adhesive. You may have a bandage. The doctor has checked you carefully, but problems can develop later. If you notice any problems or new symptoms, get medical treatment right away. Follow-up care is a key part of your treatment and safety. Be sure to make and go to all appointments, and call your doctor if you are having problems. It's also a good idea to know your test results and keep a list of the medicines you take. How can you care for yourself at home? · Keep the cut dry for the first 24 to 48 hours. After this, you can shower if your doctor okays it.  Run[place] Saliva the cut dry. · Don't soak the cut, such as in a bathtub. Your doctor will tell you when it's safe to get the cut wet. · If your doctor told you how to care for your cut, follow your doctor's instructions. If you did not get instructions, follow this general advice:  ? Do not put any kind of ointment, cream, or lotion over the area. This can make the adhesive fall off too soon. ? After the first 24 to 48 hours, wash around the cut with clean water 2 times a day. Do not use hydrogen peroxide or alcohol, which can slow healing. ? If the doctor told you to use a bandage, put on a new bandage after cleaning the cut or if the bandage gets wet or dirty. · Prop up the sore area on a pillow anytime you sit or lie down during the next 3 days. Try to keep it above the level of your heart. This will help reduce swelling. · Leave the skin adhesive on your skin until it falls off on its own. This may take 5 to 10 days. · Do not scratch, rub, or pick at the adhesive. · Do not put the sticky part of a bandage directly on the adhesive. · Avoid any activity that could cause your cut to reopen. · Be safe with medicines. Read and follow all instructions on the label. ? If the doctor gave you a prescription medicine for pain, take it as prescribed. ? If you are not taking a prescription pain medicine, ask your doctor if you can take an over-the-counter medicine. When should you call for help? Call your doctor now or seek immediate medical care if:    · You have new pain, or your pain gets worse.     · The skin near the cut is cold or pale or changes color.     · You have tingling, weakness, or numbness near the cut.     · The cut starts to bleed.     · You have trouble moving the area near the cut.     · You have symptoms of infection, such as:  ? Increased pain, swelling, warmth, or redness around the cut.  ?  Red streaks leading from the cut.  ? Pus draining from the cut.  ? A fever.    Watch closely for changes in your health, and be sure to contact your doctor if:    · The cut reopens.     · You do not get better as expected. Where can you learn more? Go to http://eloina-sherman.info/. Enter P174 in the search box to learn more about \"Cuts Closed With Adhesives: Care Instructions. \"  Current as of: September 23, 2018  Content Version: 11.9  © 0345-6320 [employer]. Care instructions adapted under license by [employer] (which disclaims liability or warranty for this information). If you have questions about a medical condition or this instruction, always ask your healthcare professional. [address] any warranty or liability for your use of this information.

## 2019-03-25 NOTE — ED TRIAGE NOTES
Pt arrives via EMS for left leg laceration. Pt states she tripped over her dog's leash and fell, states a piece of plastic cut her leg. Bandage applied by pt, bleeding controlled. Pt states she takes Plavix and Xarelto.

## 2019-03-25 NOTE — ED PROVIDER NOTES
Elizabeth Yap is a 72 y.o. female with hx significant for afib, on chronic anticoag with plavix and xarelto, Asthma, CAD, DM, DVT, HTN, CAD, GERD, fibromyalgia who presents via EMS to ER with c/o laceration to L lower leg x PTA. Pt states she was trying to get her dog untangled around the leash when she lost her balance and hit her L lower leg anteriorly on the ledge causing laceration to L lower leg. Bleeding controlled. Denies hitting her head. No other complaints. PCP: Vikas Steven MD  
PMHx significant for: Past Medical History: 
No date: Asthma 
2018: Atrial fibrillation ([facility] No date: Autoimmune disease ([facility] Comment:  fibromyalgia 10/9/2015: CAD (coronary artery disease) 
2018: Closed wedge compression fracture of T7 vertebra ([facility] No date: Diabetes ([facility] No date: DVT (deep vein thrombosis) in pregnancy ([facility] No date: GERD (gastroesophageal reflux disease) No date: HTN (hypertension) 10/9/2015: NSTEMI (non-ST elevated myocardial infarction) ([facility] 
2018: Obesity (BMI 30-39. 9) No date: Sleep apnea Comment:  wears CPAP   
PSHx significant for: Past Surgical History: 
No date: [facility] Comment:  hital hernia repair, gall bladder removed No date: BREAST SURGERY PROCEDURE UNLISTED Comment:  lumpectomy 10/9/15: CARDIAC SURG PROCEDURE UNLIST Comment:  2 stents 1971: HX  SECTION No date: HX COLOSTOMY Comment:  and reversal, post episiotomy repair 1970: HX HYSTERECTOMY 
2009: HX UROLOGICAL Comment:  bladder sling  
 
 -- Advair Diskus (Fluticasone Propion-Salmeterol) -- Rash 
 -- Aspartame -- Other (comments) -- Breo Ellipta (Fluticasone Furoate-Vilanterol) -- Other (comments) -- Ciprofloxacin -- Rash 
 -- Statins-Hmg-Coa Reductase Inhibitors -- Other (comments) --  Muscle pain Lipitor/crestor/zocor -- Sulfa (Sulfonamide Antibiotics) -- Rash 
 -- Tetracycline -- Other (comments) --  musclepain -- Zetia (Ezetimibe) -- Myalgia There are no other complaints, changes or physical findings at this time. Past Medical History:  
Diagnosis Date  Asthma  Atrial fibrillation ([facility] 11/16/2018  Autoimmune disease ([facility]   
 fibromyalgia  CAD (coronary artery disease) 10/9/2015  Closed wedge compression fracture of T7 vertebra ([facility] 11/13/2018  Diabetes ([facility]  DVT (deep vein thrombosis) in pregnancy ([facility]  GERD (gastroesophageal reflux disease)  HTN (hypertension)  NSTEMI (non-ST elevated myocardial infarction) ([facility] 10/9/2015  Obesity (BMI 30-39.9) 11/13/2018  Sleep apnea   
 wears CPAP Past Surgical History:  
Procedure Laterality Date  ABDOMEN SURGERY PROC UNLISTED    
 hital hernia repair, gall bladder removed  BREAST SURGERY PROCEDURE UNLISTED    
 lumpectomy  CARDIAC SURG PROCEDURE UNLIST  10/9/15  
 2 stents [address]  HX COLOSTOMY    
 and reversal, post episiotomy repair [address]  HX UROLOGICAL  2009  
 bladder sling Family History:  
Problem Relation Age of Onset  Diabetes Mother  Heart Failure Mother  Kidney Disease Mother  Diabetes Father  Heart Disease Father  Elevated Lipids Father  Heart Failure Father  Heart Disease Brother  Cancer Brother   
     kidney  Diabetes Brother  No Known Problems Brother  No Known Problems Brother Social History Socioeconomic History  Marital status:  Spouse name: Not on file  Number of children: Not on file  Years of education: Not on file  Highest education level: Not on file Occupational History  Not on file Social Needs  Financial resource strain: Not on file  Food insecurity:  
  Worry: Not on file Inability: Not on file  Transportation needs:  
  Medical: Not on file Non-medical: Not on file Tobacco Use  
  Smoking status: Former Smoker Last attempt to quit: 3/30/2017 Years since quittin.9  Smokeless tobacco: Never Used Substance and Sexual Activity  Alcohol use: No  
  Alcohol/week: 0.0 oz  
  Comment: very very rarely  Drug use: No  
 Sexual activity: Never Lifestyle  Physical activity:  
  Days per week: Not on file Minutes per session: Not on file  Stress: Not on file Relationships  Social connections:  
  Talks on phone: Not on file Gets together: Not on file Attends Synagogue service: Not on file Active member of club or organization: Not on file Attends meetings of clubs or organizations: Not on file Relationship status: Not on file  Intimate partner violence:  
  Fear of current or ex partner: Not on file Emotionally abused: Not on file Physically abused: Not on file Forced sexual activity: Not on file Other Topics Concern  Not on file Social History Narrative  Not on file ALLERGIES: Advair diskus [fluticasone propion-salmeterol]; Aspartame; Breo ellipta [fluticasone furoate-vilanterol]; Ciprofloxacin; Statins-hmg-coa reductase inhibitors; Sulfa (sulfonamide antibiotics); Tetracycline; and Zetia [ezetimibe] Review of Systems Constitutional: Negative. HENT: Negative. Eyes: Negative. Respiratory: Negative. Cardiovascular: Negative. Gastrointestinal: Negative. Genitourinary: Negative. Musculoskeletal: Negative. Skin: Positive for wound (laceration L anterior lower leg). Neurological: Negative. Hematological: Negative. Psychiatric/Behavioral: Negative. All other systems reviewed and are negative. Vitals:  
 19 1412 19 1427 BP: 163/84 Pulse: (!) 124 Resp: 22 Temp: 98.2 °F (36.8 °C) SpO2: 95% 95% Weight: 108.9 kg (240 lb) Height: 5' 6\" (1.676 m) Physical Exam  
Constitutional: She is oriented to person, place, and time.  She appears well-developed and well-nourished. No distress. HENT:  
Head: Normocephalic and atraumatic. Neck: Normal range of motion. Cardiovascular: Intact distal pulses and normal pulses. Musculoskeletal: Normal range of motion. She exhibits no edema or tenderness. Neurological: She is alert and oriented to person, place, and time. She has normal strength. No sensory deficit. Skin: Skin is warm and dry. No rash noted. She is not diaphoretic. No erythema. No pallor. 5.5cm linear, gaping laceration to L anterior lower leg, bleeding controlled, no FB, no tendon or muscular involvement. NVI distally, DP pulse2+. Ambulatory without difficulty. *see image below* Psychiatric: She has a normal mood and affect. Her behavior is normal.  
Nursing note and vitals reviewed. MDM Number of Diagnoses or Management Options Laceration of left lower leg, initial encounter:  
Diagnosis management comments: Vidal Sierra is a 72 y.o. female with hx significant for afib, on chronic anticoag with plavix and xarelto, Asthma, CAD, DM, DVT, HTN, CAD, GERD, fibromyalgia who presents via EMS to ER with c/o laceration to L lower leg x PTA. 5.5cm linear gaping laceration to Lanterior lower leg. Repaired in ED without difficulty. Will discharge home with strict return precautions. Vernon Adrian PA-C Amount and/or Complexity of Data Reviewed Discuss the patient with other providers: yes Patient Progress Patient progress: stable Procedures Procedure Note - Laceration Repair: 
4:18 PM 
Procedure by TATE Hall Complexity: complex 5.5cm linear laceration to anterior L lower leg  was irrigated copiously with NS under jet lavage, prepped with safclense and draped in a sterile fashion. The area was anesthetized with 8 mLs of  Lidocaine 1% with epinephrine via local infiltration.   The wound was explored with the following results: No foreign bodies found, No tendon laceration seen. The wound was repaired with Two layer suture closure: Subcutaneous Layer:  1 running suture, stitch type:subcuticular, suture: 4-0 vicryl. Skin Layer:  Closed with steri strips. .  The wound was closed with good hemostasis and approximation. Sterile dressing applied. Estimated blood loss: <5ccs The procedure took 31-45 minutes, and pt tolerated well. 4:17 PM 
Pt has been reevaluated. There are no new complaints, changes, or physical findings at this time. Medications have been reviewed w/ pt and/or family. Pt and/or family's questions have been answered. Pt and/or family expressed good understanding of the dx/tx/rx and is in agreement with plan of care. Pt instructed and agreed to f/u w/ PCP and to return to ED upon further deterioration. Pt is ready for discharge. LABORATORY TESTS: 
No results found for this or any previous visit (from the past 12 hour(s)). IMAGING RESULTS: 
No orders to display No results found. MEDICATIONS GIVEN: 
Medications  
lidocaine-EPINEPHrine (XYLOCAINE) 1 %-1:100,000 injection 15 mg (has no administration in time range)  
neomycin-bacitracnZn-polymyxnB (NEOSPORIN) ointment 1 Packet (has no administration in time range) diph,Pertuss(AC),Tet Vac-PF (BOOSTRIX) suspension 0.5 mL (0.5 mL IntraMUSCular Given 3/25/19 5165) IMPRESSION: 
1. Laceration of left lower leg, initial encounter PLAN: 
1. Current Discharge Medication List  
  
CONTINUE these medications which have NOT CHANGED Details  
cefdinir (OMNICEF) 300 mg capsule Take 1 Cap by mouth two (2) times a day. Qty: 10 Cap, Refills: 0  
  
psyllium (METAMUCIL) packet Take 1 Packet by mouth daily. metoprolol tartrate (LOPRESSOR) 25 mg tablet Take 25 mg by mouth two (2) times a day. pantoprazole (PROTONIX) 20 mg tablet Take 20 mg by mouth Daily (before dinner). simethicone (MYLICON) 80 mg chewable tablet Take 80 mg by mouth every six (6) hours as needed (gas). loperamide (IMODIUM) 2 mg capsule Take 2 mg by mouth daily as needed for Diarrhea.  
  
magnesium hydroxide (CLARK MILK OF MAGNESIA) 400 mg/5 mL suspension Take 30 mL by mouth two (2) times daily as needed for Constipation. bisacodyl (DULCOLAX) 10 mg suppository Insert 10 mg into rectum daily as needed (constipation). polyethylene glycol (MIRALAX) 17 gram packet Take 17 g by mouth daily as needed. mirtazapine (REMERON) 15 mg tablet Take 15 mg by mouth nightly. predniSONE (DELTASONE) 10 mg tablet Take 10 mg by mouth daily. potassium chloride SR (KLOR-CON 10) 10 mEq tablet Take 10 mEq by mouth two (2) times daily (with meals). calcitonin, salmon, (MIACALCIN) nasal 1 [place] by IntraNASal route daily. Alternating nostril  
  
magnesium oxide (MAG-OX) 400 mg tablet Take 1 Tab by mouth daily. Qty: 30 Tab, Refills: 0  
  
rivaroxaban (XARELTO) 20 mg tab tablet Take 1 Tab by mouth daily (with dinner). Qty: 90 Tab, Refills: 0  
  
ondansetron (ZOFRAN ODT) 4 mg disintegrating tablet Take 1 Tab by mouth every eight (8) hours as needed. Qty: 1 Tab, Refills: 0  
  
tiotropium (SPIRIVA WITH HANDIHALER) 18 mcg inhalation capsule Take 1 Cap by inhalation daily. albuterol (PROVENTIL VENTOLIN) 2.5 mg /3 mL (0.083 %) nebulizer solution 2.5 mg by Nebulization route every six (6) hours as needed for Wheezing or Shortness of Breath.  
  
montelukast (SINGULAIR) 10 mg tablet Take 10 mg by mouth nightly. albuterol (PROAIR HFA) 90 mcg/actuation inhaler Take 1 Puff by inhalation every four (4) hours as needed. clopidogrel (PLAVIX) 75 mg tab Take 75 mg by mouth daily. lactobacillus rhamnosus gg 10 billion cell (CULTURELLE) 10 billion cell capsule Take 1 Cap by mouth daily. acetaminophen (TYLENOL) 325 mg tablet Take 650 mg by mouth every six (6) hours as needed for Pain. biotin 10,000 mcg cap Take 1 Cap by mouth daily. saxagliptin (ONGLYZA) 5 mg tab tablet Take 5 mg by mouth daily. Takes at 36 DULoxetine (CYMBALTA) 60 mg capsule Take 60 mg by mouth daily. cholecalciferol (VITAMIN D3) 1,000 unit tablet Take 1,000 Units by mouth daily. azelastine-fluticasone (DYMISTA) 137-50 mcg/spray spry 1 Spray by Nasal route two (2) times a day. mometasone-formoterol (DULERA) 200-5 mcg/actuation HFA inhaler Take 2 Puffs by inhalation two (2) times a day. 2.  
Follow-up Information Follow up With Specialties Details Why Contact Info Magalene Sandhoff, MD Family Practice Schedule an appointment as soon as possible for a visit in 1 week As needed [address] 
[phone] [facility] EMERGENCY DEPT Emergency Medicine  If symptoms worsen [address] [place] [address] S 
[phone] Return to ED if worse

## 2019-03-25 NOTE — ED NOTES
Bandage applied to pt's leg. Patient does not appear to be in any acute distress/shows no evidence of clinical instability at this time. Provider has reviewed discharge instructions with the patient/family. The patient/family verbalized understanding instructions as well as need for follow up for any further symptoms. 
  
Discharge papers given, education provided, and any questions answered. Patient discharged by provider.

## 2019-04-26 ENCOUNTER — HOSPITAL ENCOUNTER (INPATIENT)
Age: 74
LOS: 3 days | Discharge: HOME | DRG: 253 | End: 2019-04-29
Attending: EMERGENCY MEDICINE | Admitting: SURGERY
Payer: MEDICARE

## 2019-04-26 ENCOUNTER — APPOINTMENT (OUTPATIENT)
Dept: GENERAL RADIOLOGY | Age: 74
DRG: 253 | End: 2019-04-26
Attending: EMERGENCY MEDICINE
Payer: MEDICARE

## 2019-04-26 ENCOUNTER — APPOINTMENT (OUTPATIENT)
Dept: VASCULAR SURGERY | Age: 74
DRG: 253 | End: 2019-04-26
Attending: EMERGENCY MEDICINE
Payer: MEDICARE

## 2019-04-26 ENCOUNTER — ANESTHESIA (OUTPATIENT)
Dept: SURGERY | Age: 74
DRG: 253 | End: 2019-04-26
Payer: MEDICARE

## 2019-04-26 ENCOUNTER — ANESTHESIA EVENT (OUTPATIENT)
Dept: SURGERY | Age: 74
DRG: 253 | End: 2019-04-26
Payer: MEDICARE

## 2019-04-26 DIAGNOSIS — I74.2 BRACHIAL ARTERY THROMBUS (HCC): Primary | ICD-10-CM

## 2019-04-26 LAB
ALBUMIN SERPL-MCNC: 2.6 G/DL (ref 3.5–5)
ALBUMIN/GLOB SERPL: 0.7 {RATIO} (ref 1.1–2.2)
ALP SERPL-CCNC: 131 U/L (ref 45–117)
ALT SERPL-CCNC: 21 U/L (ref 12–78)
ANION GAP SERPL CALC-SCNC: 6 MMOL/L (ref 5–15)
APTT PPP: 32.7 SEC (ref 22.1–32)
APTT PPP: 41.9 SEC (ref 22.1–32)
AST SERPL-CCNC: 22 U/L (ref 15–37)
ATRIAL RATE: 99 BPM
BASOPHILS # BLD: 0 K/UL (ref 0–0.1)
BASOPHILS NFR BLD: 0 % (ref 0–1)
BILIRUB SERPL-MCNC: 0.3 MG/DL (ref 0.2–1)
BNP SERPL-MCNC: 163 PG/ML
BUN SERPL-MCNC: 13 MG/DL (ref 6–20)
BUN/CREAT SERPL: 10 (ref 12–20)
CALCIUM SERPL-MCNC: 8.5 MG/DL (ref 8.5–10.1)
CALCULATED P AXIS, ECG09: 44 DEGREES
CALCULATED R AXIS, ECG10: -43 DEGREES
CALCULATED T AXIS, ECG11: 18 DEGREES
CHLORIDE SERPL-SCNC: 99 MMOL/L (ref 97–108)
CO2 SERPL-SCNC: 29 MMOL/L (ref 21–32)
COMMENT, HOLDF: NORMAL
CREAT SERPL-MCNC: 1.26 MG/DL (ref 0.55–1.02)
DIAGNOSIS, 93000: NORMAL
DIFFERENTIAL METHOD BLD: ABNORMAL
EOSINOPHIL # BLD: 0 K/UL (ref 0–0.4)
EOSINOPHIL NFR BLD: 0 % (ref 0–7)
ERYTHROCYTE [DISTWIDTH] IN BLOOD BY AUTOMATED COUNT: 15.1 % (ref 11.5–14.5)
ERYTHROCYTE [SEDIMENTATION RATE] IN BLOOD: 73 MM/HR (ref 0–30)
GLOBULIN SER CALC-MCNC: 3.9 G/DL (ref 2–4)
GLUCOSE BLD STRIP.AUTO-MCNC: 147 MG/DL (ref 65–100)
GLUCOSE BLD STRIP.AUTO-MCNC: 150 MG/DL (ref 65–100)
GLUCOSE BLD STRIP.AUTO-MCNC: 206 MG/DL (ref 65–100)
GLUCOSE SERPL-MCNC: 173 MG/DL (ref 65–100)
HCT VFR BLD AUTO: 31.6 % (ref 35–47)
HGB BLD-MCNC: 9.4 G/DL (ref 11.5–16)
IMM GRANULOCYTES # BLD AUTO: 0.1 K/UL (ref 0–0.04)
IMM GRANULOCYTES NFR BLD AUTO: 1 % (ref 0–0.5)
INR PPP: 1.1 (ref 0.9–1.1)
LYMPHOCYTES # BLD: 0.6 K/UL (ref 0.8–3.5)
LYMPHOCYTES NFR BLD: 5 % (ref 12–49)
MCH RBC QN AUTO: 25.9 PG (ref 26–34)
MCHC RBC AUTO-ENTMCNC: 29.7 G/DL (ref 30–36.5)
MCV RBC AUTO: 87.1 FL (ref 80–99)
MONOCYTES # BLD: 0.6 K/UL (ref 0–1)
MONOCYTES NFR BLD: 5 % (ref 5–13)
NEUTS SEG # BLD: 9.8 K/UL (ref 1.8–8)
NEUTS SEG NFR BLD: 89 % (ref 32–75)
NRBC # BLD: 0 K/UL (ref 0–0.01)
NRBC BLD-RTO: 0 PER 100 WBC
P-R INTERVAL, ECG05: 146 MS
PLATELET # BLD AUTO: 322 K/UL (ref 150–400)
PMV BLD AUTO: 9.9 FL (ref 8.9–12.9)
POTASSIUM SERPL-SCNC: 3.9 MMOL/L (ref 3.5–5.1)
PROT SERPL-MCNC: 6.5 G/DL (ref 6.4–8.2)
PROTHROMBIN TIME: 11.3 SEC (ref 9–11.1)
Q-T INTERVAL, ECG07: 374 MS
QRS DURATION, ECG06: 86 MS
QTC CALCULATION (BEZET), ECG08: 479 MS
RBC # BLD AUTO: 3.63 M/UL (ref 3.8–5.2)
RBC MORPH BLD: ABNORMAL
SAMPLES BEING HELD,HOLD: NORMAL
SERVICE CMNT-IMP: ABNORMAL
SODIUM SERPL-SCNC: 134 MMOL/L (ref 136–145)
THERAPEUTIC RANGE,PTTT: ABNORMAL SECS (ref 58–77)
THERAPEUTIC RANGE,PTTT: ABNORMAL SECS (ref 58–77)
TROPONIN I SERPL-MCNC: <0.05 NG/ML
VENTRICULAR RATE, ECG03: 99 BPM
WBC # BLD AUTO: 11.1 K/UL (ref 3.6–11)

## 2019-04-26 PROCEDURE — 03C70ZZ EXTIRPATION OF MATTER FROM RIGHT BRACHIAL ARTERY, OPEN APPROACH: ICD-10-PCS | Performed by: SURGERY

## 2019-04-26 PROCEDURE — C1757 CATH, THROMBECTOMY/EMBOLECT: HCPCS | Performed by: SURGERY

## 2019-04-26 PROCEDURE — 74011250636 HC RX REV CODE- 250/636: Performed by: ANESTHESIOLOGY

## 2019-04-26 PROCEDURE — 94640 AIRWAY INHALATION TREATMENT: CPT

## 2019-04-26 PROCEDURE — 85730 THROMBOPLASTIN TIME PARTIAL: CPT

## 2019-04-26 PROCEDURE — 77030020256 HC SOL INJ NACL 0.9%  500ML: Performed by: SURGERY

## 2019-04-26 PROCEDURE — 83880 ASSAY OF NATRIURETIC PEPTIDE: CPT

## 2019-04-26 PROCEDURE — 99284 EMERGENCY DEPT VISIT MOD MDM: CPT

## 2019-04-26 PROCEDURE — 65660000000 HC RM CCU STEPDOWN

## 2019-04-26 PROCEDURE — 77030014008 HC SPNG HEMSTAT J&J -C: Performed by: SURGERY

## 2019-04-26 PROCEDURE — 77030002933 HC SUT MCRYL J&J -A: Performed by: SURGERY

## 2019-04-26 PROCEDURE — 93005 ELECTROCARDIOGRAM TRACING: CPT

## 2019-04-26 PROCEDURE — 77030014647 HC SEAL FBRN TISSL BAXT -D: Performed by: SURGERY

## 2019-04-26 PROCEDURE — 85025 COMPLETE CBC W/AUTO DIFF WBC: CPT

## 2019-04-26 PROCEDURE — 85652 RBC SED RATE AUTOMATED: CPT

## 2019-04-26 PROCEDURE — 88304 TISSUE EXAM BY PATHOLOGIST: CPT

## 2019-04-26 PROCEDURE — 85610 PROTHROMBIN TIME: CPT

## 2019-04-26 PROCEDURE — 77030002987 HC SUT PROL J&J -B: Performed by: SURGERY

## 2019-04-26 PROCEDURE — 74011250636 HC RX REV CODE- 250/636: Performed by: SURGERY

## 2019-04-26 PROCEDURE — 84484 ASSAY OF TROPONIN QUANT: CPT

## 2019-04-26 PROCEDURE — 71045 X-RAY EXAM CHEST 1 VIEW: CPT

## 2019-04-26 PROCEDURE — 74011250636 HC RX REV CODE- 250/636

## 2019-04-26 PROCEDURE — 74011250636 HC RX REV CODE- 250/636: Performed by: EMERGENCY MEDICINE

## 2019-04-26 PROCEDURE — 77030020782 HC GWN BAIR PAWS FLX 3M -B

## 2019-04-26 PROCEDURE — 77030026438 HC STYL ET INTUB CARD -A: Performed by: NURSE ANESTHETIST, CERTIFIED REGISTERED

## 2019-04-26 PROCEDURE — 03C90ZZ EXTIRPATION OF MATTER FROM RIGHT ULNAR ARTERY, OPEN APPROACH: ICD-10-PCS | Performed by: SURGERY

## 2019-04-26 PROCEDURE — 77030002986 HC SUT PROL J&J -A: Performed by: SURGERY

## 2019-04-26 PROCEDURE — 03CB0ZZ EXTIRPATION OF MATTER FROM RIGHT RADIAL ARTERY, OPEN APPROACH: ICD-10-PCS | Performed by: SURGERY

## 2019-04-26 PROCEDURE — 76010000133 HC OR TIME 3 TO 3.5 HR: Performed by: SURGERY

## 2019-04-26 PROCEDURE — 74011250637 HC RX REV CODE- 250/637: Performed by: SURGERY

## 2019-04-26 PROCEDURE — 77030008684 HC TU ET CUF COVD -B: Performed by: NURSE ANESTHETIST, CERTIFIED REGISTERED

## 2019-04-26 PROCEDURE — 36415 COLL VENOUS BLD VENIPUNCTURE: CPT

## 2019-04-26 PROCEDURE — 76210000017 HC OR PH I REC 1.5 TO 2 HR: Performed by: SURGERY

## 2019-04-26 PROCEDURE — 3E05017 INTRODUCTION OF OTHER THROMBOLYTIC INTO PERIPHERAL ARTERY, OPEN APPROACH: ICD-10-PCS | Performed by: SURGERY

## 2019-04-26 PROCEDURE — 77030031139 HC SUT VCRL2 J&J -A: Performed by: SURGERY

## 2019-04-26 PROCEDURE — 74011000272 HC RX REV CODE- 272: Performed by: SURGERY

## 2019-04-26 PROCEDURE — 99285 EMERGENCY DEPT VISIT HI MDM: CPT

## 2019-04-26 PROCEDURE — 77030039266 HC ADH SKN EXOFIN S2SG -A: Performed by: SURGERY

## 2019-04-26 PROCEDURE — 93931 UPPER EXTREMITY STUDY: CPT

## 2019-04-26 PROCEDURE — 76060000037 HC ANESTHESIA 3 TO 3.5 HR: Performed by: SURGERY

## 2019-04-26 PROCEDURE — 80053 COMPREHEN METABOLIC PANEL: CPT

## 2019-04-26 PROCEDURE — 96374 THER/PROPH/DIAG INJ IV PUSH: CPT

## 2019-04-26 PROCEDURE — 74011000250 HC RX REV CODE- 250: Performed by: SURGERY

## 2019-04-26 PROCEDURE — 74011000250 HC RX REV CODE- 250

## 2019-04-26 PROCEDURE — 82962 GLUCOSE BLOOD TEST: CPT

## 2019-04-26 PROCEDURE — 77030032490 HC SLV COMPR SCD KNE COVD -B

## 2019-04-26 RX ORDER — DEXTROSE 50 % IN WATER (D50W) INTRAVENOUS SYRINGE
12.5-25 AS NEEDED
Status: DISCONTINUED | OUTPATIENT
Start: 2019-04-26 | End: 2019-04-29 | Stop reason: HOSPADM

## 2019-04-26 RX ORDER — HYDROMORPHONE HYDROCHLORIDE 2 MG/ML
2 INJECTION, SOLUTION INTRAMUSCULAR; INTRAVENOUS; SUBCUTANEOUS
Status: DISCONTINUED | OUTPATIENT
Start: 2019-04-26 | End: 2019-04-29 | Stop reason: HOSPADM

## 2019-04-26 RX ORDER — SODIUM CHLORIDE AND POTASSIUM CHLORIDE .9; .15 G/100ML; G/100ML
SOLUTION INTRAVENOUS CONTINUOUS
Status: DISCONTINUED | OUTPATIENT
Start: 2019-04-26 | End: 2019-04-27

## 2019-04-26 RX ORDER — FENTANYL CITRATE 50 UG/ML
INJECTION, SOLUTION INTRAMUSCULAR; INTRAVENOUS AS NEEDED
Status: DISCONTINUED | OUTPATIENT
Start: 2019-04-26 | End: 2019-04-26 | Stop reason: HOSPADM

## 2019-04-26 RX ORDER — HEPARIN SODIUM 5000 [USP'U]/ML
2000 INJECTION, SOLUTION INTRAVENOUS; SUBCUTANEOUS ONCE
Status: COMPLETED | OUTPATIENT
Start: 2019-04-26 | End: 2019-04-26

## 2019-04-26 RX ORDER — CEFAZOLIN SODIUM/WATER 2 G/20 ML
2 SYRINGE (ML) INTRAVENOUS ONCE
Status: COMPLETED | OUTPATIENT
Start: 2019-04-26 | End: 2019-04-26

## 2019-04-26 RX ORDER — SODIUM CHLORIDE, SODIUM LACTATE, POTASSIUM CHLORIDE, CALCIUM CHLORIDE 600; 310; 30; 20 MG/100ML; MG/100ML; MG/100ML; MG/100ML
125 INJECTION, SOLUTION INTRAVENOUS CONTINUOUS
Status: DISCONTINUED | OUTPATIENT
Start: 2019-04-26 | End: 2019-04-26 | Stop reason: HOSPADM

## 2019-04-26 RX ORDER — SODIUM CHLORIDE, SODIUM LACTATE, POTASSIUM CHLORIDE, CALCIUM CHLORIDE 600; 310; 30; 20 MG/100ML; MG/100ML; MG/100ML; MG/100ML
75 INJECTION, SOLUTION INTRAVENOUS CONTINUOUS
Status: CANCELLED | OUTPATIENT
Start: 2019-04-26 | End: 2019-04-27

## 2019-04-26 RX ORDER — LIDOCAINE HYDROCHLORIDE 20 MG/ML
INJECTION, SOLUTION EPIDURAL; INFILTRATION; INTRACAUDAL; PERINEURAL AS NEEDED
Status: DISCONTINUED | OUTPATIENT
Start: 2019-04-26 | End: 2019-04-26 | Stop reason: HOSPADM

## 2019-04-26 RX ORDER — TRAMADOL HYDROCHLORIDE 50 MG/1
50 TABLET ORAL
COMMUNITY
End: 2019-05-17

## 2019-04-26 RX ORDER — MONTELUKAST SODIUM 10 MG/1
10 TABLET ORAL
Status: DISCONTINUED | OUTPATIENT
Start: 2019-04-26 | End: 2019-04-29 | Stop reason: HOSPADM

## 2019-04-26 RX ORDER — AZELASTINE HYDROCHLORIDE, FLUTICASONE PROPIONATE 137; 50 UG/1; UG/1
1 SPRAY, METERED NASAL 2 TIMES DAILY
Status: DISCONTINUED | OUTPATIENT
Start: 2019-04-26 | End: 2019-04-26

## 2019-04-26 RX ORDER — ROCURONIUM BROMIDE 10 MG/ML
INJECTION, SOLUTION INTRAVENOUS AS NEEDED
Status: DISCONTINUED | OUTPATIENT
Start: 2019-04-26 | End: 2019-04-26 | Stop reason: HOSPADM

## 2019-04-26 RX ORDER — LIDOCAINE HYDROCHLORIDE 10 MG/ML
0.1 INJECTION, SOLUTION EPIDURAL; INFILTRATION; INTRACAUDAL; PERINEURAL AS NEEDED
Status: CANCELLED | OUTPATIENT
Start: 2019-04-26

## 2019-04-26 RX ORDER — DULOXETIN HYDROCHLORIDE 30 MG/1
60 CAPSULE, DELAYED RELEASE ORAL DAILY
Status: DISCONTINUED | OUTPATIENT
Start: 2019-04-27 | End: 2019-04-29 | Stop reason: HOSPADM

## 2019-04-26 RX ORDER — POTASSIUM CHLORIDE 750 MG/1
10 TABLET, FILM COATED, EXTENDED RELEASE ORAL 2 TIMES DAILY WITH MEALS
Status: DISCONTINUED | OUTPATIENT
Start: 2019-04-27 | End: 2019-04-29 | Stop reason: HOSPADM

## 2019-04-26 RX ORDER — CLOPIDOGREL BISULFATE 75 MG/1
75 TABLET ORAL DAILY
Status: DISCONTINUED | OUTPATIENT
Start: 2019-04-27 | End: 2019-04-29 | Stop reason: HOSPADM

## 2019-04-26 RX ORDER — MAGNESIUM SULFATE 100 %
4 CRYSTALS MISCELLANEOUS AS NEEDED
Status: DISCONTINUED | OUTPATIENT
Start: 2019-04-26 | End: 2019-04-29 | Stop reason: HOSPADM

## 2019-04-26 RX ORDER — NEOSTIGMINE METHYLSULFATE 1 MG/ML
INJECTION INTRAVENOUS AS NEEDED
Status: DISCONTINUED | OUTPATIENT
Start: 2019-04-26 | End: 2019-04-26 | Stop reason: HOSPADM

## 2019-04-26 RX ORDER — MIDAZOLAM HYDROCHLORIDE 1 MG/ML
INJECTION, SOLUTION INTRAMUSCULAR; INTRAVENOUS AS NEEDED
Status: DISCONTINUED | OUTPATIENT
Start: 2019-04-26 | End: 2019-04-26 | Stop reason: HOSPADM

## 2019-04-26 RX ORDER — HYDROMORPHONE HYDROCHLORIDE 2 MG/ML
1 INJECTION, SOLUTION INTRAMUSCULAR; INTRAVENOUS; SUBCUTANEOUS
Status: DISCONTINUED | OUTPATIENT
Start: 2019-04-26 | End: 2019-04-29 | Stop reason: HOSPADM

## 2019-04-26 RX ORDER — LISINOPRIL 5 MG/1
5 TABLET ORAL DAILY
COMMUNITY
End: 2019-07-15

## 2019-04-26 RX ORDER — HYDROCODONE BITARTRATE AND ACETAMINOPHEN 5; 325 MG/1; MG/1
1 TABLET ORAL
COMMUNITY
End: 2019-05-17

## 2019-04-26 RX ORDER — PREDNISONE 5 MG/1
10 TABLET ORAL
Status: DISCONTINUED | OUTPATIENT
Start: 2019-04-27 | End: 2019-04-29 | Stop reason: HOSPADM

## 2019-04-26 RX ORDER — HYDROMORPHONE HYDROCHLORIDE 1 MG/ML
.25-1 INJECTION, SOLUTION INTRAMUSCULAR; INTRAVENOUS; SUBCUTANEOUS
Status: DISCONTINUED | OUTPATIENT
Start: 2019-04-26 | End: 2019-04-26 | Stop reason: HOSPADM

## 2019-04-26 RX ORDER — FENTANYL CITRATE 50 UG/ML
50 INJECTION, SOLUTION INTRAMUSCULAR; INTRAVENOUS
Status: DISCONTINUED | OUTPATIENT
Start: 2019-04-26 | End: 2019-04-29 | Stop reason: HOSPADM

## 2019-04-26 RX ORDER — SODIUM CHLORIDE, SODIUM LACTATE, POTASSIUM CHLORIDE, CALCIUM CHLORIDE 600; 310; 30; 20 MG/100ML; MG/100ML; MG/100ML; MG/100ML
INJECTION, SOLUTION INTRAVENOUS
Status: DISCONTINUED | OUTPATIENT
Start: 2019-04-26 | End: 2019-04-26 | Stop reason: HOSPADM

## 2019-04-26 RX ORDER — HYDROCORTISONE SODIUM SUCCINATE 100 MG/2ML
INJECTION, POWDER, FOR SOLUTION INTRAMUSCULAR; INTRAVENOUS AS NEEDED
Status: DISCONTINUED | OUTPATIENT
Start: 2019-04-26 | End: 2019-04-26 | Stop reason: HOSPADM

## 2019-04-26 RX ORDER — HEPARIN SODIUM 5000 [USP'U]/ML
4000 INJECTION, SOLUTION INTRAVENOUS; SUBCUTANEOUS
Status: COMPLETED | OUTPATIENT
Start: 2019-04-26 | End: 2019-04-26

## 2019-04-26 RX ORDER — AZELASTINE HYDROCHLORIDE, FLUTICASONE PROPIONATE 137; 50 UG/1; UG/1
1 SPRAY, METERED NASAL DAILY
Status: DISCONTINUED | OUTPATIENT
Start: 2019-04-27 | End: 2019-04-29 | Stop reason: HOSPADM

## 2019-04-26 RX ORDER — HEPARIN SODIUM 10000 [USP'U]/100ML
12-25 INJECTION, SOLUTION INTRAVENOUS
Status: DISCONTINUED | OUTPATIENT
Start: 2019-04-26 | End: 2019-04-26

## 2019-04-26 RX ORDER — IBANDRONATE SODIUM 150 MG/1
150 TABLET, FILM COATED ORAL
COMMUNITY
End: 2020-01-01

## 2019-04-26 RX ORDER — ONDANSETRON 2 MG/ML
4 INJECTION INTRAMUSCULAR; INTRAVENOUS AS NEEDED
Status: DISCONTINUED | OUTPATIENT
Start: 2019-04-26 | End: 2019-04-26 | Stop reason: HOSPADM

## 2019-04-26 RX ORDER — GLYCOPYRROLATE 0.2 MG/ML
INJECTION INTRAMUSCULAR; INTRAVENOUS AS NEEDED
Status: DISCONTINUED | OUTPATIENT
Start: 2019-04-26 | End: 2019-04-26 | Stop reason: HOSPADM

## 2019-04-26 RX ORDER — SIMETHICONE 80 MG
80 TABLET,CHEWABLE ORAL
Status: DISCONTINUED | OUTPATIENT
Start: 2019-04-26 | End: 2019-04-29 | Stop reason: HOSPADM

## 2019-04-26 RX ORDER — PANTOPRAZOLE SODIUM 40 MG/1
40 TABLET, DELAYED RELEASE ORAL
Status: DISCONTINUED | OUTPATIENT
Start: 2019-04-27 | End: 2019-04-29 | Stop reason: HOSPADM

## 2019-04-26 RX ORDER — PROPOFOL 10 MG/ML
INJECTION, EMULSION INTRAVENOUS AS NEEDED
Status: DISCONTINUED | OUTPATIENT
Start: 2019-04-26 | End: 2019-04-26 | Stop reason: HOSPADM

## 2019-04-26 RX ORDER — DIPHENHYDRAMINE HYDROCHLORIDE 50 MG/ML
12.5 INJECTION, SOLUTION INTRAMUSCULAR; INTRAVENOUS AS NEEDED
Status: DISCONTINUED | OUTPATIENT
Start: 2019-04-26 | End: 2019-04-26 | Stop reason: HOSPADM

## 2019-04-26 RX ORDER — PROMETHAZINE HYDROCHLORIDE 25 MG/1
25 TABLET ORAL
COMMUNITY
End: 2020-01-01

## 2019-04-26 RX ORDER — TORSEMIDE 20 MG/1
20 TABLET ORAL
COMMUNITY
End: 2019-05-17

## 2019-04-26 RX ORDER — METOPROLOL TARTRATE 25 MG/1
25 TABLET, FILM COATED ORAL 2 TIMES DAILY
Status: DISCONTINUED | OUTPATIENT
Start: 2019-04-26 | End: 2019-04-29 | Stop reason: HOSPADM

## 2019-04-26 RX ORDER — ALBUTEROL SULFATE 0.83 MG/ML
2.5 SOLUTION RESPIRATORY (INHALATION)
Status: DISCONTINUED | OUTPATIENT
Start: 2019-04-26 | End: 2019-04-27

## 2019-04-26 RX ORDER — MIRTAZAPINE 15 MG/1
15 TABLET, FILM COATED ORAL
Status: DISCONTINUED | OUTPATIENT
Start: 2019-04-26 | End: 2019-04-29 | Stop reason: HOSPADM

## 2019-04-26 RX ORDER — ACETAMINOPHEN 325 MG/1
650 TABLET ORAL
Status: DISCONTINUED | OUTPATIENT
Start: 2019-04-26 | End: 2019-04-29 | Stop reason: HOSPADM

## 2019-04-26 RX ORDER — HEPARIN SODIUM 10000 [USP'U]/100ML
9-25 INJECTION, SOLUTION INTRAVENOUS
Status: DISPENSED | OUTPATIENT
Start: 2019-04-26 | End: 2019-04-27

## 2019-04-26 RX ADMIN — HEPARIN SODIUM 2000 UNITS: 5000 INJECTION INTRAVENOUS; SUBCUTANEOUS at 21:31

## 2019-04-26 RX ADMIN — MIRTAZAPINE 15 MG: 15 TABLET, FILM COATED ORAL at 21:34

## 2019-04-26 RX ADMIN — FENTANYL CITRATE 50 MCG: 50 INJECTION, SOLUTION INTRAMUSCULAR; INTRAVENOUS at 13:25

## 2019-04-26 RX ADMIN — PROPOFOL 40 MG: 10 INJECTION, EMULSION INTRAVENOUS at 13:29

## 2019-04-26 RX ADMIN — ROCURONIUM BROMIDE 40 MG: 10 INJECTION, SOLUTION INTRAVENOUS at 13:29

## 2019-04-26 RX ADMIN — FENTANYL CITRATE 50 MCG: 50 INJECTION, SOLUTION INTRAMUSCULAR; INTRAVENOUS at 20:22

## 2019-04-26 RX ADMIN — HEPARIN SODIUM 9 UNITS/KG/HR: 10000 INJECTION, SOLUTION INTRAVENOUS at 13:12

## 2019-04-26 RX ADMIN — MIDAZOLAM HYDROCHLORIDE 0.5 MG: 1 INJECTION, SOLUTION INTRAMUSCULAR; INTRAVENOUS at 13:22

## 2019-04-26 RX ADMIN — SODIUM CHLORIDE AND POTASSIUM CHLORIDE: 9; 1.49 INJECTION, SOLUTION INTRAVENOUS at 18:00

## 2019-04-26 RX ADMIN — HYDROMORPHONE HYDROCHLORIDE 1 MG: 1 INJECTION, SOLUTION INTRAMUSCULAR; INTRAVENOUS; SUBCUTANEOUS at 17:20

## 2019-04-26 RX ADMIN — HEPARIN SODIUM 9 UNITS/KG/HR: 10000 INJECTION, SOLUTION INTRAVENOUS at 18:18

## 2019-04-26 RX ADMIN — HYDROCORTISONE SODIUM SUCCINATE 100 MG: 100 INJECTION, POWDER, FOR SOLUTION INTRAMUSCULAR; INTRAVENOUS at 15:41

## 2019-04-26 RX ADMIN — FENTANYL CITRATE 100 MCG: 50 INJECTION, SOLUTION INTRAMUSCULAR; INTRAVENOUS at 13:28

## 2019-04-26 RX ADMIN — HYDROMORPHONE HYDROCHLORIDE 0.5 MG: 1 INJECTION, SOLUTION INTRAMUSCULAR; INTRAVENOUS; SUBCUTANEOUS at 17:41

## 2019-04-26 RX ADMIN — MONTELUKAST SODIUM 10 MG: 10 TABLET, FILM COATED ORAL at 21:34

## 2019-04-26 RX ADMIN — FENTANYL CITRATE 50 MCG: 50 INJECTION, SOLUTION INTRAMUSCULAR; INTRAVENOUS at 14:59

## 2019-04-26 RX ADMIN — Medication 2 G: at 13:40

## 2019-04-26 RX ADMIN — HYDROMORPHONE HYDROCHLORIDE 2 MG: 2 INJECTION INTRAMUSCULAR; INTRAVENOUS; SUBCUTANEOUS at 22:04

## 2019-04-26 RX ADMIN — ALBUTEROL SULFATE 2.5 MG: 2.5 SOLUTION RESPIRATORY (INHALATION) at 20:04

## 2019-04-26 RX ADMIN — SODIUM CHLORIDE, SODIUM LACTATE, POTASSIUM CHLORIDE, CALCIUM CHLORIDE: 600; 310; 30; 20 INJECTION, SOLUTION INTRAVENOUS at 13:22

## 2019-04-26 RX ADMIN — METOPROLOL TARTRATE 25 MG: 25 TABLET ORAL at 21:34

## 2019-04-26 RX ADMIN — LIDOCAINE HYDROCHLORIDE 100 MG: 20 INJECTION, SOLUTION EPIDURAL; INFILTRATION; INTRACAUDAL; PERINEURAL at 13:28

## 2019-04-26 RX ADMIN — NEOSTIGMINE METHYLSULFATE 3 MG: 1 INJECTION INTRAVENOUS at 16:40

## 2019-04-26 RX ADMIN — FENTANYL CITRATE 50 MCG: 50 INJECTION, SOLUTION INTRAMUSCULAR; INTRAVENOUS at 15:40

## 2019-04-26 RX ADMIN — PROPOFOL 30 MG: 10 INJECTION, EMULSION INTRAVENOUS at 15:40

## 2019-04-26 RX ADMIN — GLYCOPYRROLATE 0.4 MG: 0.2 INJECTION INTRAMUSCULAR; INTRAVENOUS at 16:40

## 2019-04-26 RX ADMIN — HEPARIN SODIUM 4000 UNITS: 5000 INJECTION INTRAVENOUS; SUBCUTANEOUS at 12:39

## 2019-04-26 NOTE — PERIOP NOTES
Floseal hemastatic matrix, ref [id number], lot [id number], exp. 03- to surgical field for application by Dr. Cleo Payne.

## 2019-04-26 NOTE — ANESTHESIA POSTPROCEDURE EVALUATION
Procedure(s): 
brachial, radial and ulnar thrombectomies of right arm. 
 
general 
 
Anesthesia Post Evaluation Patient location during evaluation: PACU Level of consciousness: awake Pain management: adequate Airway patency: patent Anesthetic complications: no 
Cardiovascular status: acceptable Respiratory status: acceptable Hydration status: acceptable Vitals Value Taken Time /77 4/26/2019  6:15 PM  
Temp 36.5 °C (97.7 °F) 4/26/2019  4:57 PM  
Pulse 112 4/26/2019  6:20 PM  
Resp 8 4/26/2019  6:20 PM  
SpO2 97 % 4/26/2019  6:20 PM  
Vitals shown include unvalidated device data.

## 2019-04-26 NOTE — BRIEF OP NOTE
BRIEF OPERATIVE NOTE Date of Procedure: 4/26/2019 Preoperative Diagnosis: RIGHT COLD ARM/ arterial clot Postoperative Diagnosis: RIGHT COLD ARM/ arterial clot Procedure(s): 
brachial, radial and ulnar thrombectomies of right arm Surgeon(s) and Role: Megan Medley MD - Primary Surgical Assistant: Darlyn Keith Surgical Staff: 
Circ-1: Darnell Xiao RN; Annabelle Wade RN Scrub Tech-1: Leticia Pali Surg Asst-1: Rise Mettle Float Staff: Shavon Jerez RN Event Time In Time Out Incision Start 1401 Incision Close Anesthesia: General  
Estimated Blood Loss: 150cc Specimens:  
ID Type Source Tests Collected by Time Destination 1 : right arm embolus. Preservative Arm, Right  Tasha Millan MD 4/26/2019 1419 Pathology Findings: large amount of chronic thrombus into radial/ulnar extending into the hand; 4mg tmp injected through ulnar artery into hand after thrombectomy Complications: none Implants: * No implants in log *

## 2019-04-26 NOTE — ANESTHESIA PREPROCEDURE EVALUATION
Anesthetic History No history of anesthetic complications Review of Systems / Medical History Patient summary reviewed, nursing notes reviewed and pertinent labs reviewed Pulmonary COPD Sleep apnea: CPAP Shortness of breath Asthma Neuro/Psych Within defined limits Cardiovascular Hypertension Dysrhythmias : atrial fibrillation Past MI (2015), CAD and cardiac stents Comments: On BB,Eliquis, Plavix Cold right arm GI/Hepatic/Renal 
  
GERD Hiatal hernia Comments: Renal insufficiency, Cr. 1.7 Endo/Other Diabetes Obesity Other Findings Comments: Fibromyalgia Daily prednisone use for temporal arteritis History of DVT H/o hypokalemia Physical Exam 
 
Airway Mallampati: IV 
TM Distance: 4 - 6 cm Neck ROM: short neck Mouth opening: Normal 
 
 Cardiovascular Rhythm: regular Rate: normal 
 
 
 
 Dental 
 
Dentition: Lower dentition intact, Upper dentition intact and Caps/crowns Pulmonary Breath sounds clear to auscultation Abdominal 
GI exam deferred Other Findings Anesthetic Plan ASA: 4 Anesthesia type: general 
 
 
 
 
Induction: Intravenous Anesthetic plan and risks discussed with: Patient

## 2019-04-26 NOTE — PERIOP NOTES
Pt. Fall protocol Yellow armband on patient, yellow non skid socks on Bed in low position, all side rails up, call bell in reach Pt. And family instructed in \"call don't fall\" protocol Use your call bell and wait for assistance, staff not family will assist you to get up and move about Pt. And family verbalize understanding of fall precautions and \"call don't fall\" protocol Pt. Considered emergency for surgery for thrombectomy, pre-procedure checklist completed.

## 2019-04-26 NOTE — ED NOTES
Right radial and Ulnar pulse checked via Doppler was unsuccessful. Right hand is cold and pale. Fingertips are purple. Brachial pulse noted with doppler.

## 2019-04-26 NOTE — PERIOP NOTES
TRANSFER - OUT REPORT: 
 
Verbal report given to MADAI Garcia on Carolyne Teran  being transferred to [id number] for routine post - op Report consisted of patients Situation, Background, Assessment and  
Recommendations(SBAR). Information from the following report(s) SBAR, OR Summary, Procedure Summary, Intake/Output, MAR and Cardiac Rhythm ST was reviewed with the receiving nurse. Lines:  
Peripheral IV 04/26/19 Left Antecubital (Active) Site Assessment Clean, dry, & intact 4/26/2019  4:56 PM  
Phlebitis Assessment 0 4/26/2019  4:56 PM  
Infiltration Assessment 0 4/26/2019  4:56 PM  
Dressing Status Clean, dry, & intact; Occlusive 4/26/2019  4:56 PM  
Dressing Type Tape;Transparent 4/26/2019  4:56 PM  
Hub Color/Line Status Blue; Infusing 4/26/2019  4:56 PM  
Action Taken Blood drawn 4/26/2019 10:53 AM  
Alcohol Cap Used Yes 4/26/2019  4:56 PM  
   
Peripheral IV Left Wrist (Active) Site Assessment Clean, dry, & intact 4/26/2019  4:56 PM  
Phlebitis Assessment 0 4/26/2019  4:56 PM  
Infiltration Assessment 0 4/26/2019  4:56 PM  
Dressing Status Clean, dry, & intact; Occlusive 4/26/2019  4:56 PM  
Dressing Type Tape;Transparent 4/26/2019  4:56 PM  
Hub Color/Line Status Pink; Infusing 4/26/2019  4:56 PM  
  
 
Opportunity for questions and clarification was provided. Patient transported with: 
 Monitor O2 @ 3 liters Registered Nurse Tech

## 2019-04-26 NOTE — ED PROVIDER NOTES
72 y.o. female with past medical history significant for CAD, DVT, A-fib, asthma, HTN, NSTEMI, sleep apnea, DM, GERD, obesity, and closed wedge compression fracture of T7 vertebra who presents from EMS with chief complaint of numbness. Pt reports right hand numbness since Sunday night. Pt notes accompanying right hand pain and \"purple\" discoloration to her right fingertips that began yesterday. Pt states she also experienced left hand numbness during onset, which has subsided. Pt also c/o nausea and diarrhea this week. Pt notes she has a history 3 coronary artery stents. Pt states she is taking plavix and xarelto d/t previous hisory of DVT. There are no other acute medical concerns at this time. PCP: Bigg Addison MD 
 
Note written by Sandra Jackson, as dictated by Ary Garnett MD 10:36 AM 
 
 
The history is provided by the patient and the EMS personnel. No  was used. Past Medical History:  
Diagnosis Date  Asthma  Atrial fibrillation ([address] 11/16/2018  Autoimmune disease ([address]   
 fibromyalgia  CAD (coronary artery disease) 10/9/2015  Closed wedge compression fracture of T7 vertebra ([address] 11/13/2018  Diabetes ([address]  DVT (deep vein thrombosis) in pregnancy ([address]  GERD (gastroesophageal reflux disease)  HTN (hypertension)  NSTEMI (non-ST elevated myocardial infarction) ([address] 10/9/2015  Obesity (BMI 30-39.9) 11/13/2018  Sleep apnea   
 wears CPAP Past Surgical History:  
Procedure Laterality Date  ABDOMEN SURGERY PROC UNLISTED    
 hital hernia repair, gall bladder removed  BREAST SURGERY PROCEDURE UNLISTED    
 lumpectomy  CARDIAC SURG PROCEDURE UNLIST  10/9/15  
 2 stents [facility]  HX COLOSTOMY    
 and reversal, post episiotomy repair [id number]  HX UROLOGICAL  2009  
 bladder sling Family History:  
Problem Relation Age of Onset  Diabetes Mother  Heart Failure Mother  Kidney Disease Mother  Diabetes Father  Heart Disease Father  Elevated Lipids Father  Heart Failure Father  Heart Disease Brother  Cancer Brother   
     kidney  Diabetes Brother  No Known Problems Brother  No Known Problems Brother Social History Socioeconomic History  Marital status:  Spouse name: Not on file  Number of children: Not on file  Years of education: Not on file  Highest education level: Not on file Occupational History  Not on file Social Needs  Financial resource strain: Not on file  Food insecurity:  
  Worry: Not on file Inability: Not on file  Transportation needs:  
  Medical: Not on file Non-medical: Not on file Tobacco Use  Smoking status: Former Smoker Last attempt to quit: 3/30/2017 Years since quittin.0  Smokeless tobacco: Never Used Substance and Sexual Activity  Alcohol use: No  
  Alcohol/week: 0.0 oz  
  Comment: very very rarely  Drug use: No  
 Sexual activity: Never Lifestyle  Physical activity:  
  Days per week: Not on file Minutes per session: Not on file  Stress: Not on file Relationships  Social connections:  
  Talks on phone: Not on file Gets together: Not on file Attends Baptism service: Not on file Active member of club or organization: Not on file Attends meetings of clubs or organizations: Not on file Relationship status: Not on file  Intimate partner violence:  
  Fear of current or ex partner: Not on file Emotionally abused: Not on file Physically abused: Not on file Forced sexual activity: Not on file Other Topics Concern  Not on file Social History Narrative  Not on file ALLERGIES: Advair diskus [fluticasone propion-salmeterol]; Aspartame; Breo ellipta [fluticasone furoate-vilanterol];  Ciprofloxacin; Statins-hmg-coa reductase inhibitors; Sulfa (sulfonamide antibiotics); Tetracycline; and Zetia [ezetimibe] Review of Systems Constitutional: Negative for fever. Gastrointestinal: Positive for diarrhea and vomiting. Musculoskeletal: Positive for myalgias. Skin: Positive for color change. Neurological: Positive for numbness. All other systems reviewed and are negative. There were no vitals filed for this visit. Physical Exam  
Constitutional: She is oriented to person, place, and time. She appears well-developed and well-nourished. No distress. Appears cushingoid. Chronically ill and debilitated. Obese. HENT:  
Head: Normocephalic and atraumatic. Eyes: Conjunctivae are normal. No scleral icterus. Neck: Neck supple. No tracheal deviation present. Cardiovascular: Normal rate, regular rhythm, normal heart sounds and intact distal pulses. Exam reveals no gallop and no friction rub. No murmur heard. Pulmonary/Chest: Effort normal and breath sounds normal. She has no wheezes. She has no rales. Abdominal: Soft. She exhibits no distension. There is no tenderness. There is no rebound and no guarding. Musculoskeletal: She exhibits no edema. No radial or ulnar pulse on US. Neurological: She is alert and oriented to person, place, and time. Skin: Skin is warm and dry. No rash noted. Right hand: Blue color change on the 3rd and 4th digits. Psychiatric: She has a normal mood and affect. Nursing note and vitals reviewed. Note written by Sandra Tineo, as dictated by Figueroa Eng MD 10:36 AM 
 
MDM Number of Diagnoses or Management Options Amount and/or Complexity of Data Reviewed Clinical lab tests: ordered and reviewed Tests in the radiology section of CPT®: ordered and reviewed Tests in the medicine section of CPT®: ordered and reviewed Discussion of test results with the performing providers: yes Obtain history from someone other than the patient: yes Discuss the patient with other providers: yes Procedures CONSULT NOTE: 
11:08 AM Martha Sanchez MD spoke with Dr. Vinicio Frey, Consult for Vascular. Discussed available diagnostic tests and clinical findings. Dr. Vinicio Frey recommends stat arterial duplex. PROGRESS NOTE: 
11:33 AM 
Rexamined Pt's hand, which is more blue. Repaged Dr. Vinicio Frey. Awaiting duplex. PROGRESS NOTE: 
11:36 AM 
Stat consult to Dr. Vinicio Frey was placed. PROGRESS NOTE: 
11:38 AM 
Spoke with duplex vascular tech asked to perform arterial duplex of the right upper extremity. ED EKG interpretation: 
Rhythm: normal sinus rhythm with sinus rrhythmia; and regular . Rate (approx.): 99 BPM; Axis: left axis deviation; ST/T wave: normal;  
Note written by Sandra Hanson, as dictated by Martha Sanchez MD 11:40 AM 
 
Total critical care time spent exclusive of procedures: 34   minutes

## 2019-04-26 NOTE — PERIOP NOTES
Surgical fibrillar ref [id number] formerly [facility], [facility]. Exp. 09-3-2021 to surgical field for application by Deo Posada.

## 2019-04-26 NOTE — PERIOP NOTES
I was in doubt as to the timing off the next     Scheduled ppt, so I enquired of  Reporting NEELIMA Davis , who informed me that  Heparin was continuous throughout surgery, heparin was continous in pacu as well, see mar. I  immediately informed receiving nurse LITZY Garcia on third floor of need to draw coag labs prmptly as per protocol  She verbalized understanding.

## 2019-04-26 NOTE — PROGRESS NOTES
TRANSFER - IN REPORT: 
 
Verbal report received from JUDITH RN (name) on Jacinto Krueger  being received from [facility]) for routine post - op Report consisted of patients Situation, Background, Assessment and  
Recommendations(SBAR). Information from the following report(s) SBAR, Kardex and Recent Results was reviewed with the receiving nurse. Opportunity for questions and clarification was provided. Assessment completed upon patients arrival to unit and care assumed. Bedside and Verbal shift change report given to Brianna Ba RN (oncoming nurse) by Luis Romero (offgoing nurse). Report included the following information SBAR, Kardex and Recent Results. ..

## 2019-04-27 LAB
ANION GAP SERPL CALC-SCNC: 7 MMOL/L (ref 5–15)
APTT PPP: 49.6 SEC (ref 22.1–32)
BASOPHILS # BLD: 0 K/UL (ref 0–0.1)
BASOPHILS NFR BLD: 0 % (ref 0–1)
BUN SERPL-MCNC: 18 MG/DL (ref 6–20)
BUN/CREAT SERPL: 12 (ref 12–20)
CALCIUM SERPL-MCNC: 7.8 MG/DL (ref 8.5–10.1)
CHLORIDE SERPL-SCNC: 101 MMOL/L (ref 97–108)
CO2 SERPL-SCNC: 27 MMOL/L (ref 21–32)
CREAT SERPL-MCNC: 1.55 MG/DL (ref 0.55–1.02)
DIFFERENTIAL METHOD BLD: ABNORMAL
EOSINOPHIL # BLD: 0 K/UL (ref 0–0.4)
EOSINOPHIL NFR BLD: 0 % (ref 0–7)
ERYTHROCYTE [DISTWIDTH] IN BLOOD BY AUTOMATED COUNT: 15.2 % (ref 11.5–14.5)
GLUCOSE BLD STRIP.AUTO-MCNC: 156 MG/DL (ref 65–100)
GLUCOSE BLD STRIP.AUTO-MCNC: 177 MG/DL (ref 65–100)
GLUCOSE BLD STRIP.AUTO-MCNC: 190 MG/DL (ref 65–100)
GLUCOSE SERPL-MCNC: 192 MG/DL (ref 65–100)
HCT VFR BLD AUTO: 32 % (ref 35–47)
HGB BLD-MCNC: 9.1 G/DL (ref 11.5–16)
IMM GRANULOCYTES # BLD AUTO: 0.1 K/UL (ref 0–0.04)
IMM GRANULOCYTES NFR BLD AUTO: 1 % (ref 0–0.5)
LYMPHOCYTES # BLD: 1.5 K/UL (ref 0.8–3.5)
LYMPHOCYTES NFR BLD: 10 % (ref 12–49)
MCH RBC QN AUTO: 25.7 PG (ref 26–34)
MCHC RBC AUTO-ENTMCNC: 28.4 G/DL (ref 30–36.5)
MCV RBC AUTO: 90.4 FL (ref 80–99)
MONOCYTES # BLD: 1.6 K/UL (ref 0–1)
MONOCYTES NFR BLD: 11 % (ref 5–13)
NEUTS SEG # BLD: 11.7 K/UL (ref 1.8–8)
NEUTS SEG NFR BLD: 78 % (ref 32–75)
NRBC # BLD: 0 K/UL (ref 0–0.01)
NRBC BLD-RTO: 0 PER 100 WBC
PLATELET # BLD AUTO: 374 K/UL (ref 150–400)
PMV BLD AUTO: 9.7 FL (ref 8.9–12.9)
POTASSIUM SERPL-SCNC: 3.8 MMOL/L (ref 3.5–5.1)
RBC # BLD AUTO: 3.54 M/UL (ref 3.8–5.2)
RBC MORPH BLD: ABNORMAL
SERVICE CMNT-IMP: ABNORMAL
SODIUM SERPL-SCNC: 135 MMOL/L (ref 136–145)
THERAPEUTIC RANGE,PTTT: ABNORMAL SECS (ref 58–77)
WBC # BLD AUTO: 14.9 K/UL (ref 3.6–11)

## 2019-04-27 PROCEDURE — 74011636637 HC RX REV CODE- 636/637: Performed by: SURGERY

## 2019-04-27 PROCEDURE — 80048 BASIC METABOLIC PNL TOTAL CA: CPT

## 2019-04-27 PROCEDURE — 51798 US URINE CAPACITY MEASURE: CPT

## 2019-04-27 PROCEDURE — 85730 THROMBOPLASTIN TIME PARTIAL: CPT

## 2019-04-27 PROCEDURE — 65660000000 HC RM CCU STEPDOWN

## 2019-04-27 PROCEDURE — 74011250636 HC RX REV CODE- 250/636: Performed by: SURGERY

## 2019-04-27 PROCEDURE — 74011000250 HC RX REV CODE- 250: Performed by: SURGERY

## 2019-04-27 PROCEDURE — 77010033678 HC OXYGEN DAILY

## 2019-04-27 PROCEDURE — 36415 COLL VENOUS BLD VENIPUNCTURE: CPT

## 2019-04-27 PROCEDURE — 82962 GLUCOSE BLOOD TEST: CPT

## 2019-04-27 PROCEDURE — 77030011256 HC DRSG MEPILEX <16IN NO BORD MOLN -A

## 2019-04-27 PROCEDURE — 74011250637 HC RX REV CODE- 250/637: Performed by: SURGERY

## 2019-04-27 PROCEDURE — 94640 AIRWAY INHALATION TREATMENT: CPT

## 2019-04-27 PROCEDURE — 85025 COMPLETE CBC W/AUTO DIFF WBC: CPT

## 2019-04-27 RX ORDER — HYDROMORPHONE HYDROCHLORIDE 2 MG/1
2 TABLET ORAL
Status: DISCONTINUED | OUTPATIENT
Start: 2019-04-27 | End: 2019-04-29 | Stop reason: HOSPADM

## 2019-04-27 RX ORDER — ALBUTEROL SULFATE 0.83 MG/ML
2.5 SOLUTION RESPIRATORY (INHALATION)
Status: DISCONTINUED | OUTPATIENT
Start: 2019-04-27 | End: 2019-04-29 | Stop reason: HOSPADM

## 2019-04-27 RX ORDER — HEPARIN SODIUM 5000 [USP'U]/ML
2000 INJECTION, SOLUTION INTRAVENOUS; SUBCUTANEOUS ONCE
Status: COMPLETED | OUTPATIENT
Start: 2019-04-27 | End: 2019-04-27

## 2019-04-27 RX ADMIN — INSULIN HUMAN 2 UNITS: 100 INJECTION, SOLUTION PARENTERAL at 16:19

## 2019-04-27 RX ADMIN — DULOXETINE HYDROCHLORIDE 60 MG: 30 CAPSULE, DELAYED RELEASE ORAL at 09:01

## 2019-04-27 RX ADMIN — ALBUTEROL SULFATE 2.5 MG: 2.5 SOLUTION RESPIRATORY (INHALATION) at 08:05

## 2019-04-27 RX ADMIN — HYDROMORPHONE HYDROCHLORIDE 2 MG: 2 TABLET ORAL at 20:16

## 2019-04-27 RX ADMIN — INSULIN HUMAN 2 UNITS: 100 INJECTION, SOLUTION PARENTERAL at 11:52

## 2019-04-27 RX ADMIN — HYDROMORPHONE HYDROCHLORIDE 2 MG: 2 TABLET ORAL at 15:30

## 2019-04-27 RX ADMIN — ALBUTEROL SULFATE 2.5 MG: 2.5 SOLUTION RESPIRATORY (INHALATION) at 01:50

## 2019-04-27 RX ADMIN — FENTANYL CITRATE 50 MCG: 50 INJECTION, SOLUTION INTRAMUSCULAR; INTRAVENOUS at 05:36

## 2019-04-27 RX ADMIN — ACETAMINOPHEN 650 MG: 325 TABLET ORAL at 09:46

## 2019-04-27 RX ADMIN — HYDROMORPHONE HYDROCHLORIDE 2 MG: 2 INJECTION INTRAMUSCULAR; INTRAVENOUS; SUBCUTANEOUS at 09:45

## 2019-04-27 RX ADMIN — METOPROLOL TARTRATE 25 MG: 25 TABLET ORAL at 22:19

## 2019-04-27 RX ADMIN — APIXABAN 10 MG: 5 TABLET, FILM COATED ORAL at 16:40

## 2019-04-27 RX ADMIN — CLOPIDOGREL BISULFATE 75 MG: 75 TABLET ORAL at 09:01

## 2019-04-27 RX ADMIN — HEPARIN SODIUM 2000 UNITS: 5000 INJECTION INTRAVENOUS; SUBCUTANEOUS at 04:59

## 2019-04-27 RX ADMIN — METOPROLOL TARTRATE 25 MG: 25 TABLET ORAL at 08:00

## 2019-04-27 RX ADMIN — SAXAGLIPTIN 5 MG: 5 TABLET, FILM COATED ORAL at 16:46

## 2019-04-27 RX ADMIN — POTASSIUM CHLORIDE 10 MEQ: 750 TABLET, EXTENDED RELEASE ORAL at 09:01

## 2019-04-27 RX ADMIN — PANTOPRAZOLE SODIUM 40 MG: 40 TABLET, DELAYED RELEASE ORAL at 16:40

## 2019-04-27 RX ADMIN — MONTELUKAST SODIUM 10 MG: 10 TABLET, FILM COATED ORAL at 22:19

## 2019-04-27 RX ADMIN — PREDNISONE 10 MG: 5 TABLET ORAL at 08:01

## 2019-04-27 RX ADMIN — APIXABAN 10 MG: 5 TABLET, FILM COATED ORAL at 10:41

## 2019-04-27 RX ADMIN — POTASSIUM CHLORIDE 10 MEQ: 750 TABLET, EXTENDED RELEASE ORAL at 16:40

## 2019-04-27 RX ADMIN — FENTANYL CITRATE 50 MCG: 50 INJECTION, SOLUTION INTRAMUSCULAR; INTRAVENOUS at 19:20

## 2019-04-27 RX ADMIN — SODIUM CHLORIDE AND POTASSIUM CHLORIDE: 9; 1.49 INJECTION, SOLUTION INTRAVENOUS at 04:58

## 2019-04-27 RX ADMIN — ALBUTEROL SULFATE 2.5 MG: 2.5 SOLUTION RESPIRATORY (INHALATION) at 19:42

## 2019-04-27 RX ADMIN — INSULIN HUMAN 2 UNITS: 100 INJECTION, SOLUTION PARENTERAL at 08:11

## 2019-04-27 RX ADMIN — HYDROMORPHONE HYDROCHLORIDE 2 MG: 2 INJECTION INTRAMUSCULAR; INTRAVENOUS; SUBCUTANEOUS at 07:45

## 2019-04-27 RX ADMIN — FENTANYL CITRATE 50 MCG: 50 INJECTION, SOLUTION INTRAMUSCULAR; INTRAVENOUS at 22:35

## 2019-04-27 RX ADMIN — MIRTAZAPINE 15 MG: 15 TABLET, FILM COATED ORAL at 22:19

## 2019-04-27 NOTE — PROGRESS NOTES
Bedside and Verbal shift change report given to Tomy Hernandez RN (oncoming nurse) by Tennille Mojica RN (offgoing nurse). Report included the following information SBAR, Kardex and Recent Results. .. Bedside and Verbal shift change report given to Sarah Matute RN (oncoming nurse) by Elsa Coley (offgoing nurse). Report included the following information SBAR, Kardex and Recent Results. ..

## 2019-04-27 NOTE — PROGRESS NOTES
Shift Summary 1930: Bedside and Verbal shift change report given to Cuco Casper RN (oncoming nurse) by Digna Villalobos RN (offgoing nurse). Report included the following information SBAR, Kardex, ED Summary, OR Summary, Procedure Summary, Intake/Output, MAR, Accordion and Recent Results. Patient drowsy but oriented x4, easily aroused. Hand cold, pulses not palpable, fingers purple/dusky Wrapped hand in light blanket to help warm/assist perfusion 2030: Daughter at bedside. Hand beginning to warm, increased sensation, increased pain in hand with burning/tingling. Treated pain with prn fentanly 2150: Patient complaining that pain medication wore off, experiencing severe 9/20 pain in hand. Denies excessive pain in surgical site, endorses burning pain in hand. Site clean, dry, intact, no swelling. Called Dr. Orin Starr, shared assessment of hand and pain, received orders for prn dilaudid 1mg or 2mg iv q2 hrs prn for breakthrough pain only. Orders placed. Verified order with pharmacist.  
 
[phone]: patient reports almost total pain relief with dilaudid. Beginning to feel pulses, hand continues to warm. Placed patient on home CPAP. Increased oxygen bled into CPAP to 4L for O2 saturation dipping with fluctuations in patient's breathing (sleep apnea). Placed Purewick external catheter on patient, who has not yet voided. 0130: Patient has been sleeping soundly. Denies current pain. Endorses sensation in hand. Pulses assessed with dopplar in RUE- strongx3. Hand continues to warm. Has not yet voided/  
 
0530: Patient has not voided. Bladder scan revealed 360cc retained. Bathed patient (CHG wipes), changed linen, checked for incontinence. Replaced pure wick. Patient says she does not normally have trouble urinating. I encouraged her to try to void. 0600: R arm painful this morning but warm. 0730:  Bedside and Verbal shift change report given to Digna Villalobos RN (oncoming nurse) by Sloane Arredondo RN (offgoing nurse). Report included the following information SBAR, Kardex, Procedure Summary, Intake/Output, MAR, Accordion, Recent Results and Cardiac Rhythm NSR, Stach. Care Plan Summary Problem: Falls - Risk of 
Goal: *Absence of Falls Description Document Hima Kiran Fall Risk and appropriate interventions in the flowsheet. Outcome: Progressing Towards Goal 
  
Problem: Patient Education: Go to Patient Education Activity Goal: Patient/Family Education Outcome: Progressing Towards Goal 
  
Problem: Pressure Injury - Risk of 
Goal: *Prevention of pressure injury Description Document Darrion Scale and appropriate interventions in the flowsheet. Outcome: Progressing Towards Goal 
  
Problem: Patient Education: Go to Patient Education Activity Goal: Patient/Family Education Outcome: Progressing Towards Goal 
  
Problem: Patient Education: Go to Patient Education Activity Goal: Patient/Family Education Outcome: Progressing Towards Goal 
  
Problem: Surgical Pathway Day of Surgery Goal: *Adequate urinary output (equal to or greater than 30 milliliters/hour) Description Ambulatory Surgery patients voiding without difficulty. Outcome: Resolved/Not Met Problem: Surgical Pathway Day of Surgery Goal: Activity/Safety Outcome: Resolved/Met Goal: Consults, if ordered Outcome: Resolved/Met Goal: Nutrition/Diet Outcome: Resolved/Met Goal: Medications Outcome: Resolved/Met Goal: Respiratory Outcome: Resolved/Met Goal: Treatments/Interventions/Procedures Outcome: Resolved/Met Goal: Psychosocial 
Outcome: Resolved/Met Goal: *No signs and symptoms of infection or wound complications Outcome: Resolved/Met Goal: *Optimal pain control at patient's stated goal 
Outcome: Resolved/Met Goal: *Hemodynamically stable Outcome: Resolved/Met Goal: *Tolerating diet Outcome: Resolved/Met Goal: *Demonstrates progressive activity Outcome: Resolved/Met

## 2019-04-27 NOTE — PROGRESS NOTES
ADULT PROTOCOL: JET AEROSOL ASSESSMENT Patient  Susan Galeas     72 y.o.   female     4/27/2019  8:48 AM 
 
Breath Sounds Pre Procedure: Right Breath Sounds: Diminished Left Breath Sounds: Diminished Breath Sounds Post Procedure: Right Breath Sounds: Clear, Diminished Left Breath Sounds: Clear, Diminished Breathing pattern: Pre procedure Breathing Pattern: Regular Post procedure Breathing Pattern: Regular Heart Rate: Pre procedure Pulse: 90 
         Post procedure Pulse: 90 Resp Rate: Pre procedure Respirations: 18 Post procedure Respirations: 18 Peak Flow: Pre bronchodilator Post bronchodilator FVC/FEV1:   
 
Incentive Spirometry:    
     
 
Cough: Pre procedure Cough: Non-productive Post procedure Cough: Non-productive Suctioned: NO Sputum: Pre procedure Post procedure Oxygen: O2 Device: Nasal cannula   Flow rate (L/min) 3 Changed: YES SpO2: Pre procedure SpO2: 95 %   with oxygen Post procedure SpO2: 98 %  with oxygen Nebulizer Therapy: Current medications Aerosolized Medications: Albuterol Changed: YES BID Smoking History:  
 
Problem List:  
Patient Active Problem List  
Diagnosis Code  Type II diabetes mellitus ([facility]) E11.9  
 NSTEMI (non-ST elevated myocardial infarction) ([facility] I21.4  Coronary artery disease involving native coronary artery without angina pectoris I25.10  Essential hypertension I10  
 Recurrent deep vein thrombosis (DVT) ([facility]) I82.409  Chronic anticoagulation Z79.01  
 Chest pain R07.9  Generalized abdominal pain R10.84  Type 2 diabetes with nephropathy ([facility]) E11.21  
 Dyspnea R06.00  
 ARF (acute renal failure) ([facility]) N17.9  Obesity (BMI 30-39. 9) E66.9  Closed wedge compression fracture of T7 vertebra ([facility]) S22.060A  Partial small bowel obstruction ([facility] K56.600  Sleep apnea G47.30  Atrial fibrillation ([address] I48.91  
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

## 2019-04-27 NOTE — PROGRESS NOTES
[facility]: MED RECONCILIATION Comments/Recommendations:  
Reviewed PTA medications with patient's daughter at bedside. Pt postoperative and unable to review home medications. Patient's daughter is knowledgeable regarding her home medications. Patient's daughter states that the metoprolol is \"as needed\" however on further questioning patient takes this medication twice daily and holds if her SBP is <100 mmHg. Would recommend to adjust dose based on BP outpatient. Medications added: Torsemide- PRN for LE edema Ibandronate 150 mg Lisinopril 5 mg Promethazine 25 mg 
 
Tramadol and hydrocodone- PRN Medications removed: 
 
Cefdinir Calcitonin- Change to ibandronate due to cost 
Pt has not been having constipation, she has been using loperamide for diarrhea- Removed dulcolax, milk of magnesia, polyethylene glycol, metamucil Simethicone Medications adjusted: 
 
Dymista once daily Metoprolol- Added hold parameters Prednisone- Increased to BID Rivaroxaban- Pt takes 15 mg once daily in the evening Information obtained from: Patient's family at bedside, [address] Allergies: Advair diskus [fluticasone propion-salmeterol]; Aspartame; Breo ellipta [fluticasone furoate-vilanterol]; Ciprofloxacin; Statins-hmg-coa reductase inhibitors; Sulfa (sulfonamide antibiotics); Tetracycline; and Zetia [ezetimibe] Prior to Admission Medications:  
 
Medication Documentation Review Audit Reviewed by Genesis Gaviria, PHARMD (Pharmacist) on 04/26/19 at [phone] Medication Sig Documenting Provider Last Dose Status Taking?  
acetaminophen (TYLENOL) 325 mg tablet Take 650 mg by mouth every six (6) hours as needed for Pain. Provider, Historical 4/25/2019 Active Yes  
albuterol (PROAIR HFA) 90 mcg/actuation inhaler Take 1 Puff by inhalation every four (4) hours as needed.  Provider, Historical  Active Yes  
albuterol (PROVENTIL VENTOLIN) 2.5 mg /3 mL (0.083 %) nebulizer solution 2.5 mg by Nebulization route every six (6) hours as needed for Wheezing or Shortness of Breath. Provider, Historical  Active Yes  
azelastine-fluticasone (DYMISTA) 137-50 mcg/spray spry 1 [place] by Nasal route daily. Provider, Historical 4/26/2019 Active Yes  
biotin 10,000 mcg cap Take 1 Cap by mouth daily. Provider, Historical 4/26/2019 Active Yes  
cholecalciferol (VITAMIN D3) 1,000 unit tablet Take 1,000 Units by mouth daily. Provider, Historical 4/26/2019 Active Yes  
clopidogrel (PLAVIX) 75 mg tab Take 75 mg by mouth daily. Provider, Historical 4/25/2019 0800 Active Yes DULoxetine (CYMBALTA) 60 mg capsule Take 60 mg by mouth daily. Provider, Historical 4/26/2019 Active Yes HYDROcodone-acetaminophen (NORCO) 5-325 mg per tablet Take 1 Tab by mouth every six (6) hours as needed for Pain. Provider, Historical 4/19/2019 Active Yes  
ibandronate (BONIVA) 150 mg tablet Take 150 mg by mouth every thirty (30) days. Last dose 4/1/19 Provider, Historical 4/1/2019 Active Yes  
lactobacillus rhamnosus gg 10 billion cell (CULTURELLE) 10 billion cell capsule Take 1 Cap by mouth daily. Provider, Historical 4/26/2019 Active Yes  
lisinopril (PRINIVIL, ZESTRIL) 5 mg tablet Take 5 mg by mouth daily. Provider, Historical 4/26/2019 Active Yes  
loperamide (IMODIUM) 2 mg capsule Take 2 mg by mouth daily as needed for Diarrhea. Provider, Historical 4/19/2019 Active Yes  
magnesium oxide (MAG-OX) 400 mg tablet Take 1 Tab by mouth daily. Porsche Culp MD 4/26/2019 Active Yes  
metoprolol tartrate (LOPRESSOR) 25 mg tablet Take 25 mg by mouth two (2) times a day. Hold if SBP <100 mmHg Provider, Historical 4/26/2019 Active Yes  
mirtazapine (REMERON) 15 mg tablet Take 15 mg by mouth nightly. Provider, Historical 4/25/2019 Active Yes  
mometasone-formoterol (DULERA) 200-5 mcg/actuation HFA inhaler Take 2 Puffs by inhalation two (2) times a day. Provider, Historical 4/26/2019 Active Yes montelukast (SINGULAIR) 10 mg tablet Take 10 mg by mouth nightly. Provider, Historical 4/25/2019 Active Yes  
ondansetron (ZOFRAN ODT) 4 mg disintegrating tablet Take 1 Tab by mouth every eight (8) hours as needed. Saranya Sanchez MD  Active Yes  
pantoprazole (PROTONIX) 20 mg tablet Take 20 mg by mouth Daily (before dinner). Provider, Historical 4/26/2019 Active Yes  
potassium chloride SR (KLOR-CON 10) 10 mEq tablet Take 10 mEq by mouth two (2) times daily (with meals). Provider, Historical 4/26/2019 Active Yes  
predniSONE (DELTASONE) 10 mg tablet Take 10 mg by mouth two (2) times a day. Provider, Historical 4/26/2019 0800 Active Yes  
promethazine (PHENERGAN) 25 mg tablet Take 25 mg by mouth every eight (8) hours as needed for Nausea (Nausea not relieved by ondansetron). Provider, Historical  Active Yes  
rivaroxaban (XARELTO) 15 mg tab tablet Take 15 mg by mouth daily (with dinner). Provider, Historical 4/25/2019 Active Yes  
saxagliptin (ONGLYZA) 5 mg tab tablet Take 5 mg by mouth daily. Provider, Historical 4/26/2019 AM Active Yes  
tiotropium (SPIRIVA WITH HANDIHALER) 18 mcg inhalation capsule Take 1 Cap by inhalation daily. Provider, Historical 4/26/2019 Active Yes  
torsemide (DEMADEX) 20 mg tablet Take 20 mg by mouth daily as needed for Other (Lower Extremity Edema). Indications: visible water retention Provider, Historical 4/19/2019 Active Yes  
traMADol (ULTRAM) 50 mg tablet Take 50 mg by mouth every eight (8) hours as needed for Pain. Provider, Historical 4/19/2019 Active Yes Carmella Moore, PHARMD   Contact: [phone]

## 2019-04-27 NOTE — PROGRESS NOTES
Vascular Surgery 
--still with severe hand pain but pain meds seem to help a lot 
--+ dopplerable radial/ulnar but middle 3 finger tips still blue Patient Vitals for the past 12 hrs: 
 Temp Pulse Resp BP SpO2  
04/27/19 0805     94 % 04/27/19 0800  89  110/60   
04/27/19 0753 97.9 °F (36.6 °C) 93 25 111/62 96 % 04/27/19 0655  89     
04/27/19 0300 97.8 °F (36.6 °C) 92 9 111/71 91 % 04/27/19 0150     96 % 04/27/19 0100  99 8 110/84 93 % 04/27/19 0000    (!) 110/92   
04/26/19 2315  (!) 102 8  93 % 04/26/19 2304  (!) 119     
04/26/19 2300 97.7 °F (36.5 °C) 100 11 113/76 94 % 04/26/19 2200  (!) 109 15 145/75 100 % 04/26/19 2134  (!) 113  139/74  Incisions ok; no hematoma/swelling; hand is warmer Plan: 
--difficult IV access--occluding; heparin drip is not adequate (ptt's are low); will transition to tx dose of eliquis; stop heparin after eliquis dose is given --improve pain control 
--place field for urinary retention 
--OOB to chair 
--? Echo/cards consult if any issues with this

## 2019-04-27 NOTE — OP NOTES
[facility] 
OPERATIVE REPORT Name:  Emmy Levy 
MR#:  [id number] :  1945 ACCOUNT #:  [de-identified] DATE OF SERVICE:  2019 PREOPERATIVE DIAGNOSIS:  Right arm embolus. POSTOPERATIVE DIAGNOSIS:  Right arm embolus. PROCEDURE PERFORMED:  Thrombectomy of right arm including brachial, radial and ulnar arteries with wrist exploration. SURGEON:  MD Mey Rosas. ANESTHESIA:  General. 
 
COMPLICATIONS:  None. SPECIMENS REMOVED:  Right arm embolus. IMPLANTS:  None. ESTIMATED BLOOD LOSS:  150 mL. INDICATIONS FOR PROCEDURE:  The patient is a 72-year-old with extensive medical history, who presented with one week of right hand numbness and tingling and developed severe pain and coldness and blueness overnight. Preoperative Duplex revealed brachial artery thrombus with poor flow distally. She has history of AFib and despite being on Xarelto and Plavix, she has developed this problem. She is being taken emergently to the operating room and risks and benefits of the procedure were discussed preop with the patient and she voiced understanding and wished to proceed. PROCEDURE:  The patient was placed supine on the operating table and general anesthesia was established. The right arm was prepped and draped in standard surgical fashion. A longitudinal incision was made near the elbow crease, taken down through the skin and subcutaneous tissue. There was no pulse from the brachial artery here. With difficulty, this was dissected out. There was a large amount of adherence, adipose and scar to the artery consistent with chronic thrombosis. The artery has an unusual appearance as well and appears significantly inflamed. The brachial artery was dissected out proximally and distally and loops were placed around for control. The patient was continued on her heparin drip.   The loops were placed up and an arteriotomy was made and a Lv catheter was used proximally and distally to retrieve a large amount of thrombus that was sent off as specimen. Brisk inflow was achieved with ease distally and multiple thrombectomies were performed with Lv catheters yielding a fair amount of backbleeding. After this, the arteriotomy was then closed with running 5-0 Prolene. The clamps were released. There was a very poor Doppler signal in this area and no good Doppler signal on the wrist nor palpable pulses on the wrist.  At this point, the dissection was taken more distally in the arm and this was fairly difficult due to the scar but the radial and ulnar bifurcations were dissected out. Loops were placed around the individual branches for control and another arteriotomy was made in the brachial artery. A #3 Lv catheter was ran individually into the radial and into the ulnar artery with retrieval of good clot and backbleeding from each of these. The result already was good inflow here, so the arteriotomy was then closed. There was good pulses in both of these arteries. However, there continued to be a relatively poor Doppler signal but improved from the previous one but no Doppler signal at the wrist.  At this point, rather than shooting an arteriogram, I thought it would be best to go ahead and do a thrombectomy at the wrist based on the appearance of her fingers. An incision was made over the radial artery as well as the ulnar artery, taking down through the skin and subcutaneous tissue. The radial artery was noted to be very small and diseased as it was in the proximal dissection. Nevertheless, loops were placed around for control, and in similar fashion, an arteriotomy was made. A #2 Lv was used to thrombectomize both proximally and distally and clot was able to be retrieved from both proximal and distal Lv runs.   There was good strong backbleeding here and we were able to extend the Lv about 20 cm distally from the wrist incision. So this was clearly somewhere in the hand. After this was completed, the arteriotomy was repaired. After this, in similar fashion, a thrombectomy was performed of the ulnar artery. There was good inflow achieved. This also appeared to be a fairly small artery. However, after retrieving thrombus from the hand distally, the backbleeding was not as good as in the radial artery. At this point, 2 mg of reconstituted tPA were administered through a Ashley needle into the hand and allowed to dwell. After this, the proximal portion was packed with heparinized saline and then another Lv attempt was made. There was no more clot that was able to be retrieved from the hand. Another 2 mg of tPA were inserted and then the arteriotomy was then repaired with 6-0 Prolene. The wounds were thoroughly irrigated at this point with antibiotic solution and closed in layers of 3-0 Vicryl, 4-0 Monocryl and Dermabond for dressing. The hand seemed significantly improved although there were still some bluish areas in the fingertips. She will be continued on IV heparin. The patient tolerated the procedure otherwise well without any apparent complication and she is being transferred to the recovery room. Gregor Childress MD [id number] 
D:  04/26/2019 16:54 
T:  04/27/2019 0:42 JOB #:  [id number] CC:  MD Celio Banuelos MD

## 2019-04-27 NOTE — PROGRESS NOTES
4- Reason for Admission:   Brachial Artery Thrombus RRAT Score:  34 Resources/supports as identified by patient/family:  Grand-daughter to stay with her after discharge Top Challenges facing patient (as identified by patient/family and CM): Finances/Medication cost?  No     
           
Transportation? No 
           
Support system or lack thereof? Good family support Living arrangements? No  
        
Self-care/ADLs/Cognition? No 
       
Current Advanced Directive/Advance Care Plan:  Not on file Plan for utilizing home health: TBD Likelihood of readmission: High 
              
Transition of Care Plan:   TBD I tried to speak with the pt to determine potential discharge needs but she has a procedure this AM and was very groggy. I contacted her daughter, Bigg Ko ([phone]), to discuss discharge needs/plans. She said the [place] [facility]. She is normally independent with her ADL's and did drive but the pt's grand-daughter will be staying with her for awhile after discharge until she regains her strength. She has a rollator, 1/2 bed rails and grab bars around the toilet and in the shower. She does prepare most of her own meals but also gets some from the cafe at [address] [place] [address]. Her PCP is Dr. Armando Polanco who has no nurse navigator. She has prescription drug coverage dn gets her medications from [place] on [employer]. CM will continue to follow and assist with discharge needs. Care Management Interventions PCP Verified by CM: Yes(Dr. Annie Dodd nurse navigator) Current Support Network: Lives Alone, Own Home Confirm Follow Up Transport: Family Plan discussed with Pt/Family/Caregiver: Yes Discharge Location Discharge Placement: (Home with family care) MYLES Monique, CM

## 2019-04-28 LAB
BASOPHILS # BLD: 0 K/UL (ref 0–0.1)
BASOPHILS NFR BLD: 0 % (ref 0–1)
DIFFERENTIAL METHOD BLD: ABNORMAL
EOSINOPHIL # BLD: 0.1 K/UL (ref 0–0.4)
EOSINOPHIL NFR BLD: 1 % (ref 0–7)
ERYTHROCYTE [DISTWIDTH] IN BLOOD BY AUTOMATED COUNT: 15.3 % (ref 11.5–14.5)
GLUCOSE BLD STRIP.AUTO-MCNC: 100 MG/DL (ref 65–100)
GLUCOSE BLD STRIP.AUTO-MCNC: 111 MG/DL (ref 65–100)
GLUCOSE BLD STRIP.AUTO-MCNC: 139 MG/DL (ref 65–100)
GLUCOSE BLD STRIP.AUTO-MCNC: 167 MG/DL (ref 65–100)
HCT VFR BLD AUTO: 26.3 % (ref 35–47)
HGB BLD-MCNC: 7.5 G/DL (ref 11.5–16)
IMM GRANULOCYTES # BLD AUTO: 0.1 K/UL (ref 0–0.04)
IMM GRANULOCYTES NFR BLD AUTO: 1 % (ref 0–0.5)
LYMPHOCYTES # BLD: 1.6 K/UL (ref 0.8–3.5)
LYMPHOCYTES NFR BLD: 16 % (ref 12–49)
MCH RBC QN AUTO: 25.4 PG (ref 26–34)
MCHC RBC AUTO-ENTMCNC: 28.5 G/DL (ref 30–36.5)
MCV RBC AUTO: 89.2 FL (ref 80–99)
MONOCYTES # BLD: 0.9 K/UL (ref 0–1)
MONOCYTES NFR BLD: 9 % (ref 5–13)
NEUTS SEG # BLD: 7.4 K/UL (ref 1.8–8)
NEUTS SEG NFR BLD: 73 % (ref 32–75)
NRBC # BLD: 0 K/UL (ref 0–0.01)
NRBC BLD-RTO: 0 PER 100 WBC
PLATELET # BLD AUTO: 289 K/UL (ref 150–400)
PMV BLD AUTO: 9.8 FL (ref 8.9–12.9)
RBC # BLD AUTO: 2.95 M/UL (ref 3.8–5.2)
RBC MORPH BLD: ABNORMAL
SERVICE CMNT-IMP: ABNORMAL
SERVICE CMNT-IMP: NORMAL
WBC # BLD AUTO: 10.1 K/UL (ref 3.6–11)
WBC MORPH BLD: ABNORMAL

## 2019-04-28 PROCEDURE — 74011250636 HC RX REV CODE- 250/636: Performed by: SURGERY

## 2019-04-28 PROCEDURE — 74011250637 HC RX REV CODE- 250/637: Performed by: SURGERY

## 2019-04-28 PROCEDURE — 74011000250 HC RX REV CODE- 250: Performed by: SURGERY

## 2019-04-28 PROCEDURE — 74011636637 HC RX REV CODE- 636/637: Performed by: SURGERY

## 2019-04-28 PROCEDURE — 94640 AIRWAY INHALATION TREATMENT: CPT

## 2019-04-28 PROCEDURE — 77030013140 HC MSK NEB VYRM -A

## 2019-04-28 PROCEDURE — 65660000000 HC RM CCU STEPDOWN

## 2019-04-28 PROCEDURE — 82962 GLUCOSE BLOOD TEST: CPT

## 2019-04-28 PROCEDURE — 77010033678 HC OXYGEN DAILY

## 2019-04-28 PROCEDURE — 36415 COLL VENOUS BLD VENIPUNCTURE: CPT

## 2019-04-28 PROCEDURE — 85025 COMPLETE CBC W/AUTO DIFF WBC: CPT

## 2019-04-28 RX ADMIN — METOPROLOL TARTRATE 25 MG: 25 TABLET ORAL at 08:28

## 2019-04-28 RX ADMIN — MONTELUKAST SODIUM 10 MG: 10 TABLET, FILM COATED ORAL at 21:16

## 2019-04-28 RX ADMIN — HYDROMORPHONE HYDROCHLORIDE 2 MG: 2 TABLET ORAL at 12:55

## 2019-04-28 RX ADMIN — PREDNISONE 10 MG: 5 TABLET ORAL at 07:55

## 2019-04-28 RX ADMIN — ALBUTEROL SULFATE 2.5 MG: 2.5 SOLUTION RESPIRATORY (INHALATION) at 07:21

## 2019-04-28 RX ADMIN — METOPROLOL TARTRATE 25 MG: 25 TABLET ORAL at 21:16

## 2019-04-28 RX ADMIN — MIRTAZAPINE 15 MG: 15 TABLET, FILM COATED ORAL at 21:16

## 2019-04-28 RX ADMIN — CLOPIDOGREL BISULFATE 75 MG: 75 TABLET ORAL at 08:28

## 2019-04-28 RX ADMIN — SAXAGLIPTIN 5 MG: 5 TABLET, FILM COATED ORAL at 18:18

## 2019-04-28 RX ADMIN — POTASSIUM CHLORIDE 10 MEQ: 750 TABLET, EXTENDED RELEASE ORAL at 07:55

## 2019-04-28 RX ADMIN — FENTANYL CITRATE 50 MCG: 50 INJECTION, SOLUTION INTRAMUSCULAR; INTRAVENOUS at 05:08

## 2019-04-28 RX ADMIN — HYDROMORPHONE HYDROCHLORIDE 2 MG: 2 TABLET ORAL at 07:55

## 2019-04-28 RX ADMIN — APIXABAN 10 MG: 5 TABLET, FILM COATED ORAL at 18:17

## 2019-04-28 RX ADMIN — HYDROMORPHONE HYDROCHLORIDE 2 MG: 2 TABLET ORAL at 01:53

## 2019-04-28 RX ADMIN — ALBUTEROL SULFATE 2.5 MG: 2.5 SOLUTION RESPIRATORY (INHALATION) at 20:38

## 2019-04-28 RX ADMIN — APIXABAN 10 MG: 5 TABLET, FILM COATED ORAL at 08:28

## 2019-04-28 RX ADMIN — DULOXETINE HYDROCHLORIDE 60 MG: 30 CAPSULE, DELAYED RELEASE ORAL at 08:28

## 2019-04-28 RX ADMIN — POTASSIUM CHLORIDE 10 MEQ: 750 TABLET, EXTENDED RELEASE ORAL at 16:36

## 2019-04-28 RX ADMIN — PANTOPRAZOLE SODIUM 40 MG: 40 TABLET, DELAYED RELEASE ORAL at 16:36

## 2019-04-28 RX ADMIN — INSULIN HUMAN 2 UNITS: 100 INJECTION, SOLUTION PARENTERAL at 16:36

## 2019-04-28 NOTE — PROGRESS NOTES
Vascular Surgery --slightly better today re: symptoms -- 
Patient Vitals for the past 12 hrs: 
 Temp Pulse Resp BP SpO2  
04/28/19 0800 98.5 °F (36.9 °C) (!) 105 23 105/48 93 % 04/28/19 0743  95 18 102/58 98 % 04/28/19 0725     92 % 04/28/19 0702  90     
04/27/19 2342 98.6 °F (37 °C) 82 10 104/56 96 % 04/27/19 2219  90  100/58   
04/27/19 2146  91     
 
HR/BP ok Right hand is warm with cool/blue middle 3 finger tips; motor/sensation all intact Recent Results (from the past 12 hour(s)) GLUCOSE, POC Collection Time: 04/28/19  7:08 AM  
Result Value Ref Range Glucose (POC) 111 (H) 65 - 100 mg/dL Performed by Bonny Johnson (PCT) CBC WITH AUTOMATED DIFF Collection Time: 04/28/19  8:25 AM  
Result Value Ref Range WBC 10.1 3.6 - 11.0 K/uL  
 RBC 2.95 (L) 3.80 - 5.20 M/uL HGB 7.5 (L) 11.5 - 16.0 g/dL HCT 26.3 (L) 35.0 - 47.0 % MCV 89.2 80.0 - 99.0 FL  
 MCH 25.4 (L) 26.0 - 34.0 PG  
 MCHC 28.5 (L) 30.0 - 36.5 g/dL  
 RDW 15.3 (H) 11.5 - 14.5 % PLATELET 238 416 - 798 K/uL MPV 9.8 8.9 - 12.9 FL  
 NRBC 0.0 0  WBC ABSOLUTE NRBC 0.00 0.00 - 0.01 K/uL NEUTROPHILS PENDING % LYMPHOCYTES PENDING % MONOCYTES PENDING % EOSINOPHILS PENDING % BASOPHILS PENDING % IMMATURE GRANULOCYTES PENDING %  
 ABS. NEUTROPHILS PENDING K/UL  
 ABS. LYMPHOCYTES PENDING K/UL  
 ABS. MONOCYTES PENDING K/UL  
 ABS. EOSINOPHILS PENDING K/UL  
 ABS. BASOPHILS PENDING K/UL  
 ABS. IMM. GRANS. PENDING K/UL  
 DF PENDING   
 
PLan: 
 
--transitioned to tx dose eliquis for 1 week; fingers are demarcating--may lose portion of middle 3 fingers 
--field placed yesterday afternoon; remove at midnight tonight 
--will ask OT/pT to see re right hand 
--possibly home tomorrow

## 2019-04-28 NOTE — PROGRESS NOTES
Shift Summary 1930: Bedside and Verbal shift change report given to Travon Antoine RN (oncoming nurse) by Jonna Howard RN (offgoing nurse). Report included the following information SBAR, Kardex, Procedure Summary, Intake/Output, MAR, Accordion, Recent Results and Cardiac Rhythm NSR, Stach . Patient pain still not able to be fully controlled with PO dilaudid. R hand arm extremely patient to move and to touch, very hypersensitive.  
 
0600: Transitioned to room air, saturating well. 0730: Bedside and Verbal shift change report given to Cintia Looney RN (oncoming nurse) by Travon Antoine RN (offgoing nurse). Report included the following information SBAR, Kardex, Procedure Summary, Intake/Output, MAR, Accordion, Recent Results and Cardiac Rhythm NSR. Encouraged patient to make it a goal to spend a good part of the day up in chair. Offered to help patient to chair for breakfast, but she declined. Agreed that she would be up for lunch. Care Plan Summary Problem: Falls - Risk of 
Goal: *Absence of Falls Description Document Catherene Bunting Fall Risk and appropriate interventions in the flowsheet. Outcome: Progressing Towards Goal 
  
Problem: Pressure Injury - Risk of 
Goal: *Prevention of pressure injury Description Document Darrion Scale and appropriate interventions in the flowsheet. Outcome: Progressing Towards Goal 
  
Problem: Patient Education: Go to Patient Education Activity Goal: Patient/Family Education Outcome: Progressing Towards Goal 
  
Problem: Surgical Pathway Day of Surgery Goal: *Adequate urinary output (equal to or greater than 30 milliliters/hour) Description Ambulatory Surgery patients voiding without difficulty. Outcome: Resolved/Met Problem: Surgical Pathway Post-Op Day 1 Goal: Activity/Safety Outcome: Resolved/Met Goal: Diagnostic Test/Procedures Outcome: Resolved/Met Goal: Nutrition/Diet Outcome: Resolved/Met Goal: Discharge Planning Outcome: Resolved/Met Goal: Medications Outcome: Resolved/Met Goal: Respiratory Outcome: Resolved/Met Goal: Treatments/Interventions/Procedures Outcome: Resolved/Met Goal: Psychosocial 
Outcome: Resolved/Met Goal: *No signs and symptoms of infection or wound complications Outcome: Resolved/Met Goal: *Optimal pain control at patient's stated goal 
Outcome: Resolved/Met Goal: *Adequate urinary output (equal to or greater than 30 milliliters/hour) Outcome: Resolved/Met Goal: *Hemodynamically stable Outcome: Resolved/Met Goal: *Tolerating diet Outcome: Resolved/Met Goal: *Demonstrates progressive activity Outcome: Resolved/Met Goal: *Lungs clear or at baseline Outcome: Resolved/Met

## 2019-04-29 VITALS
RESPIRATION RATE: 22 BRPM | DIASTOLIC BLOOD PRESSURE: 56 MMHG | HEIGHT: 66 IN | OXYGEN SATURATION: 95 % | WEIGHT: 260.2 LBS | HEART RATE: 90 BPM | SYSTOLIC BLOOD PRESSURE: 101 MMHG | TEMPERATURE: 98.5 F | BODY MASS INDEX: 41.82 KG/M2

## 2019-04-29 LAB
GLUCOSE BLD STRIP.AUTO-MCNC: 173 MG/DL (ref 65–100)
GLUCOSE BLD STRIP.AUTO-MCNC: 92 MG/DL (ref 65–100)
RIGHT DIST BRACHIAL A PSV: 37.5 CM/S
RIGHT PROX BRACHIAL A PSV: 84.1 CM/S
RIGHT PROX RADIAL A PSV: 8.9 CM/S
RIGHT SUBCLAVIAN SYS: 148.3 CM/S
SERVICE CMNT-IMP: ABNORMAL
SERVICE CMNT-IMP: NORMAL

## 2019-04-29 PROCEDURE — 97535 SELF CARE MNGMENT TRAINING: CPT

## 2019-04-29 PROCEDURE — 77030034850

## 2019-04-29 PROCEDURE — 74011000250 HC RX REV CODE- 250: Performed by: SURGERY

## 2019-04-29 PROCEDURE — 94640 AIRWAY INHALATION TREATMENT: CPT

## 2019-04-29 PROCEDURE — 74011636637 HC RX REV CODE- 636/637: Performed by: SURGERY

## 2019-04-29 PROCEDURE — 97165 OT EVAL LOW COMPLEX 30 MIN: CPT

## 2019-04-29 PROCEDURE — 74011250637 HC RX REV CODE- 250/637: Performed by: SURGERY

## 2019-04-29 PROCEDURE — 82962 GLUCOSE BLOOD TEST: CPT

## 2019-04-29 PROCEDURE — 51798 US URINE CAPACITY MEASURE: CPT

## 2019-04-29 PROCEDURE — 97110 THERAPEUTIC EXERCISES: CPT

## 2019-04-29 RX ORDER — OXYCODONE AND ACETAMINOPHEN 5; 325 MG/1; MG/1
2 TABLET ORAL
Qty: 30 TAB | Refills: 0 | Status: SHIPPED | OUTPATIENT
Start: 2019-04-29 | End: 2019-05-02

## 2019-04-29 RX ADMIN — METOPROLOL TARTRATE 25 MG: 25 TABLET ORAL at 08:04

## 2019-04-29 RX ADMIN — CLOPIDOGREL BISULFATE 75 MG: 75 TABLET ORAL at 08:02

## 2019-04-29 RX ADMIN — PREDNISONE 10 MG: 5 TABLET ORAL at 08:01

## 2019-04-29 RX ADMIN — DULOXETINE HYDROCHLORIDE 60 MG: 30 CAPSULE, DELAYED RELEASE ORAL at 08:02

## 2019-04-29 RX ADMIN — POTASSIUM CHLORIDE 10 MEQ: 750 TABLET, EXTENDED RELEASE ORAL at 08:02

## 2019-04-29 RX ADMIN — APIXABAN 10 MG: 5 TABLET, FILM COATED ORAL at 08:02

## 2019-04-29 RX ADMIN — INSULIN HUMAN 2 UNITS: 100 INJECTION, SOLUTION PARENTERAL at 11:43

## 2019-04-29 RX ADMIN — HYDROMORPHONE HYDROCHLORIDE 2 MG: 2 TABLET ORAL at 00:52

## 2019-04-29 RX ADMIN — ALBUTEROL SULFATE 2.5 MG: 2.5 SOLUTION RESPIRATORY (INHALATION) at 08:09

## 2019-04-29 NOTE — PROGRESS NOTES
Occupational Therapy EVALUATION/discharge Patient: Temi Ballesteros (20 y.o. female) Date: 4/29/2019 Primary Diagnosis: Brachial artery thrombus (HCC) [I74.2] Procedure(s) (LRB): 
brachial, radial and ulnar thrombectomies of right arm (Right) 3 Days Post-Op Precautions: fall ASSESSMENT:  
Based on the objective data described below, the patient presents with good overall activity tolerance following admission on 4/26 for brachial, radial, and ulnar thrombectomies of R arm with c/o numbness and tingling of R hand. OT order placed for R hand exercises. Provided handout and education regarding R hand exercises (see below). She tolerated well with c/o mild pain. She reports tingling and numbness is significantly improved and suspect ROM/strength will also improve as swelling and incision site is healed. Patient requesting to use the bathroom. Assisted to/from bathroom with supervision. Education provided regarding adaptive ADL techniques and safe transfer techniques. Patient required increased time (mod I) for all ADLs. Patient has no further skilled OT needs and anticipates discharge home today. No follow-up OT services recommended at this time. Advised patient to follow-up with MD as she may require outpatient hand therapy once incision heals depending on progress. Further skilled acute occupational therapy is not indicated at this time. Discharge Recommendations: none at this time Further Equipment Recommendations for Discharge: none SUBJECTIVE:  
Patient stated Laura Pruett will try to do these; It already feels better.  re: R hand/exercises OBJECTIVE DATA SUMMARY:  
HISTORY:  
Past Medical History:  
Diagnosis Date  Asthma  Atrial fibrillation ([facility] 11/16/2018  [facility]   
 fibromyalgia  CAD (coronary artery disease) 10/9/2015  Closed wedge compression fracture of T7 vertebra ([facility] 11/13/2018  Diabetes ([facility]  DVT (deep vein thrombosis) in pregnancy ([facility]  GERD (gastroesophageal reflux disease)  HTN (hypertension)  NSTEMI (non-ST elevated myocardial infarction) ([facility] 10/9/2015  Obesity (BMI 30-39.9) 11/13/2018  Sleep apnea   
 wears CPAP Past Surgical History:  
Procedure Laterality Date  ABDOMEN SURGERY PROC UNLISTED    
 hital hernia repair, gall bladder removed  BREAST SURGERY PROCEDURE UNLISTED    
 lumpectomy  CARDIAC SURG PROCEDURE UNLIST  10/9/15  
 2 stents [place]  HX COLOSTOMY    
 and reversal, post episiotomy repair [address]  HX UROLOGICAL  2009  
 bladder sling Prior Level of Function/Environment/Context: Patient lives alone. She reports independence with care PTA. Home Situation Home Environment: Private residence # Steps to Enter: 0 One/Two Story Residence: One story Living Alone: Yes Support Systems: Child(helio) Patient Expects to be Discharged to[de-identified] Private residence Current DME Used/Available at Home: [address] [facility], [place], straight, Walker, rollator, Walker, rolling, Wheelchair, Shower chair, Commode, bedside, Grab bars, [employer] dressing aides Tub or Shower Type: Shower Hand dominance: Right EXAMINATION OF PERFORMANCE DEFICITS: 
Cognitive/Behavioral Status: 
Neurologic State: Alert Orientation Level: Oriented X4 Cognition: Appropriate decision making; Appropriate for age attention/concentration; Appropriate safety awareness Perception: Appears intact Perseveration: No perseveration noted Safety/Judgement: Awareness of environment;Good awareness of safety precautions Skin: Intact aside from surgical site incisions which were closed Edema: Mild swelling noted to R UE Hearing: Auditory Auditory Impairment: None Vision/Perceptual:   
Tracking: Able to track stimulus in all quadrants w/o difficulty Diplopia: No   
Acuity: Within Defined Limits Corrective Lenses: Glasses Range of Motion: R upper: impaired throughout s/p sx L upper: WDL Strength: 
R upper: decreased non functional  
L upper: WDL Coordination: 
Fine Motor Skills-Upper: Right Impaired;Left Intact Gross Motor Skills-Upper: Right Impaired;Left Intact Tone & Sensation: 
Tone: Normal 
Sensation: intact Balance: 
Sitting: Intact Standing: Intact Functional Mobility and Transfers for ADLs:Bed Mobility: 
Rolling: Supervision Supine to Sit: Supervision Sit to Supine: Supervision Scooting: Supervision Transfers: 
Sit to Stand: Supervision Stand to Sit: Supervision Bed to Chair: Supervision Bathroom Mobility: Supervision/set up Toilet Transfer : Supervision ADL Assessment: 
Feeding: Modified independent Oral Facial Hygiene/Grooming: Modified Independent Bathing: Modified independent Upper Body Dressing: Modified independent Lower Body Dressing: Modified independent Toileting: Modified independent Cognitive Retraining Safety/Judgement: Awareness of environment;Good awareness of safety precautions Functional Measure: 
Barthel Index: 
 
Bathin Bladder: 10 Bowels: 10 
Groomin Dressing: 10 Feeding: 10 Mobility: 15 
Stairs: 10 Toilet Use: 10 Transfer (Bed to Chair and Back): 15 Total: 100/100 Percentage of impairment  
0% 1-19% 20-39% 40-59% 60-79% 80-99% 100% Barthel Score 0-100 100 99-80 79-60 59-40 20-39 1-19 
 0 The Barthel ADL Index: Guidelines 1. The index should be used as a record of what a patient does, not as a record of what a patient could do. 2. The main aim is to establish degree of independence from any help, physical or verbal, however minor and for whatever reason. 3. The need for supervision renders the patient not independent. 4. A patient's performance should be established using the best available evidence.  Asking the patient, friends/relatives and nurses are the usual sources, but direct observation and common sense are also important. However direct testing is not needed. 5. Usually the patient's performance over the preceding 24-48 hours is important, but occasionally longer periods will be relevant. 6. Middle categories imply that the patient supplies over 50 per cent of the effort. 7. Use of aids to be independent is allowed. Drea Donald., Barthel, D.W. ([id number]). Functional evaluation: the Barthel Index. [address] (14)2. DAMI Brown, Gladys Parra., [facility] [place], [address] (1999). Measuring the change indisability after inpatient rehabilitation; comparison of the responsiveness of the Barthel Index and Functional Hardy Measure. Journal of Neurology, Neurosurgery, and Psychiatry, 66(4), 605-110. PARRISH Salvador, SANIYA Parry, & Les Wilkerson M.A. (2004.) Assessment of post-stroke quality of life in cost-effectiveness studies: The usefulness of the Barthel Index and the EuroQoL-5D. [facility], 13, 336-26 Occupational Therapy Evaluation Charge Determination History Examination Decision-Making LOW Complexity : Brief history review  LOW Complexity : 1-3 performance deficits relating to physical, cognitive , or psychosocial skils that result in activity limitations and / or participation restrictions  LOW Complexity : No comorbidities that affect functional and no verbal or physical assistance needed to complete eval tasks Based on the above components, the patient evaluation is determined to be of the following complexity level: LOW Pain: 
Pain Scale 1: Numeric (0 - 10) Pain Intensity 1: 0 Activity Tolerance:  
Patient tolerated eval well. After treatment:  
[]  Patient left in no apparent distress sitting up in chair 
[x]  Patient left in no apparent distress in bed 
[x]  Call bell left within reach [x]  Nursing notified 
[]  Caregiver present [x]  Bed alarm activated COMMUNICATION/EDUCATION:  
Communication/Collaboration: 
[x]      Home safety education was provided and the patient/caregiver indicated understanding. [x]      Patient/family have participated as able and agree with findings and recommendations. []      Patient is unable to participate in plan of care at this time. Findings and recommendations were discussed with: Physical Therapist, Registered Nurse and patient. Rosalina Bailey OTR/ALEJANDRO Time Calculation: 40 mins

## 2019-04-29 NOTE — PROGRESS NOTES
Discharge instructions, including information on new medication and general field care, were reviewed with patient. All questions were answered. IV and heart monitor were removed. Patient received a copy of her discharge papers and a printed prescription for Percocet. The prescription for Eliquis was called in to [facility] by Annabelle Olivo (originally sent to Mail order pharmacy). Appointment with [facility] was scheduled and added to AVS. Patient did not want leg bag at this time and elected to keep field catheter drainage bag until seen by Dr. Luis Metcalf.

## 2019-04-29 NOTE — DISCHARGE INSTRUCTIONS
Patient Discharge Instructions    Chanel Chowdhury / [id number] : 1945    Admitted 2019 Discharged: 2019     Take Home Medications            · It is important that you take the medication exactly as they are prescribed. · Keep your medication in the bottles provided by the pharmacist and keep a list of the medication names, dosages, and times to be taken in your wallet. · Do not take other medications without consulting your doctor. What to do at Home    Recommended diet: Regular Diet,     Recommended activity: Activity as tolerated,      Follow-up with Dr Laraine Libman in 2 weeks. FOLLOW UP WITH [facility]  -  APPOINTMENT SCHEDULED FOR Monday MAY 6 AT 8:40 AM @ [facility], [address] [place] [address] Ludwin Pozo   [phone],        Information obtained by :  I understand that if any problems occur once I am at home I am to contact my physician. I understand and acknowledge receipt of the instructions indicated above.                                                                                                                                            Physician's or R.N.'s Signature                                                                  Date/Time                                                                                                                                              Patient or Representative Signature                                                          Date/Time

## 2019-04-29 NOTE — PROGRESS NOTES
Dr. Anahy Reddy to call in eliquis 5 mg RX to [facility] at [phone]. Pt is clear for discharge.

## 2019-04-29 NOTE — PROGRESS NOTES
Orders received, chart reviewed and patient evaluated by occupational therapy. Recommend patient to discharged home with no OT services indicated pending progression with skilled acute occupational therapy. Recommend with nursing patient to complete as able in order to maintain strength, endurance and independence: OOB to chair 3x/day, ADLs with supervision/setup and mobilizing to the bathroom for toileting with supervision assist. Thank you for your assistance. Full evaluation to follow.

## 2019-04-29 NOTE — PROGRESS NOTES
1030: Patient catheter discontinued last night. Patient has not voided since removal. Bladder scan shows 414mL. Notified Dr. Ashutosh Stewart nurse. Awaiting return call. 1040: Spoke to Dr. Ashutosh Stewart. Orders to replace field now. Patient to go home with field. RN to set up follow up with Dr. Rochell Seip. Do not need a urology consult. Call and spoke to [facility]rology. Appointment set up for Monday May 6 at 8:40. Patient notified. Will put on discharge paperwork.

## 2019-04-30 LAB
GLUCOSE BLD STRIP.AUTO-MCNC: NORMAL MG/DL (ref 65–100)
SERVICE CMNT-IMP: NORMAL

## 2019-05-13 ENCOUNTER — HOSPITAL ENCOUNTER (EMERGENCY)
Age: 74
Discharge: HOME | End: 2019-05-13
Attending: STUDENT IN AN ORGANIZED HEALTH CARE EDUCATION/TRAINING PROGRAM
Payer: MEDICARE

## 2019-05-13 VITALS
SYSTOLIC BLOOD PRESSURE: 111 MMHG | BODY MASS INDEX: 38.45 KG/M2 | HEIGHT: 67 IN | WEIGHT: 245 LBS | HEART RATE: 118 BPM | RESPIRATION RATE: 18 BRPM | TEMPERATURE: 98.2 F | DIASTOLIC BLOOD PRESSURE: 66 MMHG | OXYGEN SATURATION: 97 %

## 2019-05-13 DIAGNOSIS — I96 DRY GANGRENE (HCC): Primary | ICD-10-CM

## 2019-05-13 PROCEDURE — 99282 EMERGENCY DEPT VISIT SF MDM: CPT

## 2019-05-13 NOTE — DISCHARGE INSTRUCTIONS
Patient Education   Patient Education        Learning About Buerger's Disease (Thromboangiitis Obliterans)  What is Buerger's disease? Buerger's disease (thromboangiitis obliterans) is a problem with the smaller blood vessels of the arms and legs. Inflammation, which is part of the body's immune response, happens in these blood vessels. That causes clumps of cells to form clots. The clots can reduce or block blood flow in the blood vessels. That makes it hard for blood and oxygen to reach the ends of your arms and legs. The lack of blood and oxygen can damage the tissues in your fingers and toes, which can be very painful. In serious cases, the tissue might die. What are the symptoms? The most common symptoms are pain and a change of color in the fingers and toes. The pain can occur both while you are active and when you rest, and it can be severe. You may have a reaction to cold that causes pain and numbness. Your skin may look purple or pale. You may have painful ulcers (skin sores) on your fingers and toes. Unlike some other types of skin sores, these ulcers don't heal. They may lead to tissue death. This is called gangrene. What causes it? Smoking tobacco can lead to Buerger's disease. Almost all of the people who have this problem are smokers. In rare cases, a person who doesn't smoke tobacco can get this problem. How is it treated? The goals of treating Buerger's disease are to relieve your symptoms, restore blood flow to your hands and feet, and prevent tissue damage. Stopping smoking is the only treatment that can relieve symptoms and keep the disease from getting worse. It can also lower your risk of having badly damaged tissue removed. Your doctor may recommend other treatments to manage pain or help heal ulcers. For example, your doctor may prescribe medicine to try to relieve pain. You may also take medicine that might prevent blood clots.   If the tissue damage on your fingers or toes is too severe, you may need to have the finger or toe amputated. How can you care for yourself at home? · Don't smoke. Smoking can make the disease worse. If you need help quitting, talk to your doctor about stop-smoking programs and medicines. These can increase your chances of quitting for good. · If your doctor recommends it, get more exercise. This may improve blood flow. Walking is a good choice. Bit by bit, increase the amount you walk every day. Try for at least 30 minutes on most days of the week. · Take good care of your hands and feet. ? Treat cuts and scrapes on your arms and legs right away. Poor blood flow prevents (or slows) quick healing of even small cuts or scrapes. ? Avoid shoes that are too tight or that rub your feet. ? Avoid socks or stockings that are tight enough to leave elastic-band marks on your legs. Tight socks can make circulation problems worse. ? Keep your hands and feet clean and moisturized to prevent drying and cracking. ? Wear gloves to protect your fingers from injury. ? Place cotton or lamb's wool between your toes to prevent rubbing and to absorb moisture. ? If you have a sore on your hand or foot, keep it dry and cover it with a nonstick bandage until you see your doctor. Follow-up care is a key part of your treatment and safety. Be sure to make and go to all appointments, and call your doctor if you are having problems. It's also a good idea to know your test results and keep a list of the medicines you take. Where can you learn more? Go to http://eloina-sherman.info/. Enter B100 in the search box to learn more about \"Learning About Buerger's Disease (Thromboangiitis Obliterans). \"  Current as of: July 22, 2018  Content Version: 11.9  © 1565-0332 [employer]. Care instructions adapted under license by [employer] (which disclaims liability or warranty for this information).  If you have questions about a medical condition or this instruction, always ask your healthcare professional. [place] [address] any warranty or liability for your use of this information.

## 2019-05-13 NOTE — ED PROVIDER NOTES
Ester Watson is a 72 y.o. female who presents to the ED with a c/o a color change to her 2nd, 3rd, and 4th rt digits due to loss of circulation today. Pt denies any pain but states that she had Radial, Ulna, and Brachial Thrombectomy on 2019 and she started noticing discoloration in her fingers a few days after her surgery and pt states that it has not improved. She also notes that she has no sensation in these fingers as well. Of note, pt is on Eliquis and Plavix. Pt specifically denies chest pain, shortness of breath, n/v,d , fever, chills,abdominal pain, back pain, cough, leg swelling, dizziness or any other acute sx. Pt denies any recent travel, known sick contacts, or recent illness. PCP: Leonid Garcia MD 
PMHx significant for: DVT, HTN, CAD, NSTEMI, Sleep Apnea, DM, Asthma, GERD, A-Fib PSHx significant for: Colostomy, , Hysterectomy,Bladder Sling, Lumpectomy, Coronary Stents Social Hx: Tobacco: Former Smoker EtOH: none Illicit drug use: none There are no further complaints or symptoms at this time. Signed by: Caryle Bushy, scribpj for Jocelyn Howard. Milvia Bolaños MD on May 13th, 2019 at 2:52 PM. 
 
 
  
 
Past Medical History:  
Diagnosis Date  Asthma  Atrial fibrillation ([address] 2018  Autoimmune disease ([address]   
 fibromyalgia  CAD (coronary artery disease) 10/9/2015  Closed wedge compression fracture of T7 vertebra ([address] 2018  Diabetes ([address]  DVT (deep vein thrombosis) in pregnancy ([address]  GERD (gastroesophageal reflux disease)  HTN (hypertension)  NSTEMI (non-ST elevated myocardial infarction) ([address] 10/9/2015  Obesity (BMI 30-39.9) 2018  Sleep apnea   
 wears CPAP Past Surgical History:  
Procedure Laterality Date  ABDOMEN SURGERY PROC UNLISTED    
 hital hernia repair, gall bladder removed  BREAST SURGERY PROCEDURE UNLISTED    
 lumpectomy  CARDIAC SURG PROCEDURE UNLIST  10/9/15  
 2 stents [facility]  HX COLOSTOMY    
 and reversal, post episiotomy repair 2 [facility]  HX UROLOGICAL  2009  
 bladder sling Family History:  
Problem Relation Age of Onset  Diabetes Mother  Heart Failure Mother  Kidney Disease Mother  Diabetes Father  Heart Disease Father  Elevated Lipids Father  Heart Failure Father  Heart Disease Brother  Cancer Brother   
     kidney  Diabetes Brother  No Known Problems Brother  No Known Problems Brother Social History Socioeconomic History  Marital status:  Spouse name: Not on file  Number of children: Not on file  Years of education: Not on file  Highest education level: Not on file Occupational History  Not on file Social Needs  Financial resource strain: Not on file  Food insecurity:  
  Worry: Not on file Inability: Not on file  Transportation needs:  
  Medical: Not on file Non-medical: Not on file Tobacco Use  Smoking status: Former Smoker Last attempt to quit: 3/30/2017 Years since quittin.1  Smokeless tobacco: Never Used Substance and Sexual Activity  Alcohol use: No  
  Alcohol/week: 0.0 oz  
  Comment: very very rarely  Drug use: No  
 Sexual activity: Never Lifestyle  Physical activity:  
  Days per week: Not on file Minutes per session: Not on file  Stress: Not on file Relationships  Social connections:  
  Talks on phone: Not on file Gets together: Not on file Attends Druze service: Not on file Active member of club or organization: Not on file Attends meetings of clubs or organizations: Not on file Relationship status: Not on file  Intimate partner violence:  
  Fear of current or ex partner: Not on file Emotionally abused: Not on file Physically abused: Not on file Forced sexual activity: Not on file Other Topics Concern  Not on file Social History Narrative  Not on file ALLERGIES: Advair diskus [fluticasone propion-salmeterol]; Aspartame; Breo ellipta [fluticasone furoate-vilanterol]; Ciprofloxacin; Statins-hmg-coa reductase inhibitors; Sulfa (sulfonamide antibiotics); Tetracycline; and Zetia [ezetimibe] Review of Systems Constitutional: Negative for activity change, diaphoresis, fatigue and fever. HENT: Negative for congestion and sore throat. Eyes: Negative for photophobia and visual disturbance. Respiratory: Negative for chest tightness and shortness of breath. Cardiovascular: Negative for chest pain, palpitations and leg swelling. Gastrointestinal: Negative for abdominal pain, blood in stool, constipation, diarrhea, nausea and vomiting. Genitourinary: Negative for difficulty urinating, dysuria, flank pain, frequency and hematuria. Musculoskeletal: Negative for back pain. Skin: Positive for color change. Neurological: Positive for numbness. Negative for dizziness, syncope and headaches. All other systems reviewed and are negative. Vitals:  
 05/13/19 1446 BP: 118/60 Pulse: (!) 119 Resp: 18 Temp: 98.2 °F (36.8 °C) SpO2: 97% Weight: 111.1 kg (245 lb) Height: 5' 7\" (1.702 m) Physical Exam  
Cardiovascular: Tachycardia present. Neurological: A sensory deficit is present. No sensation in rt index, middle, or ring fingers. Skin:  
Discoloration of rt middle, index finger ( see picture) Bilateral surgical incisions at radial and ulna area of rt hand (erythema, tenderness present) Surgical incision at rt brachial (erythematous and some purulence at surgical site) MDM Number of Diagnoses or Management Options Dry gangrene ([address]): Amount and/or Complexity of Data Reviewed Discuss the patient with other providers: yes Risk of Complications, Morbidity, and/or Mortality Presenting problems: moderate Diagnostic procedures: moderate Management options: moderate Patient Progress Patient progress: stable Procedures 4:25 PM 
Spoke to Dr. Jeremías Schafer who states that someone from his office will give her a call tomorrow morning so that definitive plan of finger amputation will be discussed.

## 2019-05-13 NOTE — ED TRIAGE NOTES
Patient arrives with c/o loss of circulation to 2nd, 3rd and 4th digits to R hand; \"blood clot surgery\" done on April 27th (Radial, Ulna and Brachial Thrombectomy) and patient started noticing discoloration a few days post surgery.

## 2019-05-13 NOTE — ED NOTES
Dr. Sania Silver has reviewed discharge instructions with patient and family including prescription(s) if applicable. Patient has received and verbalizes understanding of all instructions and has no further questions at this time. Patient exits ED via wheelchair accompanied by family. Patient in no acute distress.

## 2019-05-17 ENCOUNTER — HOSPITAL ENCOUNTER (INPATIENT)
Age: 74
LOS: 7 days | Discharge: HOME HEALTH | DRG: 862 | End: 2019-05-24
Attending: EMERGENCY MEDICINE | Admitting: INTERNAL MEDICINE
Payer: MEDICARE

## 2019-05-17 DIAGNOSIS — I96 GANGRENE OF FINGER OF RIGHT HAND (HCC): ICD-10-CM

## 2019-05-17 DIAGNOSIS — L03.113 CELLULITIS OF RIGHT UPPER EXTREMITY: Primary | ICD-10-CM

## 2019-05-17 PROBLEM — J45.20 MILD INTERMITTENT ASTHMA: Status: ACTIVE | Noted: 2019-05-17

## 2019-05-17 LAB
ALBUMIN SERPL-MCNC: 2.9 G/DL (ref 3.5–5)
ALBUMIN/GLOB SERPL: 0.7 {RATIO} (ref 1.1–2.2)
ALP SERPL-CCNC: 248 U/L (ref 45–117)
ALT SERPL-CCNC: 37 U/L (ref 12–78)
ANION GAP SERPL CALC-SCNC: 7 MMOL/L (ref 5–15)
AST SERPL-CCNC: 19 U/L (ref 15–37)
BASOPHILS # BLD: 0.1 K/UL (ref 0–0.1)
BASOPHILS NFR BLD: 0 % (ref 0–1)
BILIRUB SERPL-MCNC: 0.2 MG/DL (ref 0.2–1)
BUN SERPL-MCNC: 26 MG/DL (ref 6–20)
BUN/CREAT SERPL: 22 (ref 12–20)
CALCIUM SERPL-MCNC: 8.6 MG/DL (ref 8.5–10.1)
CHLORIDE SERPL-SCNC: 106 MMOL/L (ref 97–108)
CO2 SERPL-SCNC: 24 MMOL/L (ref 21–32)
CREAT SERPL-MCNC: 1.17 MG/DL (ref 0.55–1.02)
DIFFERENTIAL METHOD BLD: ABNORMAL
EOSINOPHIL # BLD: 0.2 K/UL (ref 0–0.4)
EOSINOPHIL NFR BLD: 1 % (ref 0–7)
ERYTHROCYTE [DISTWIDTH] IN BLOOD BY AUTOMATED COUNT: 15.6 % (ref 11.5–14.5)
EST. AVERAGE GLUCOSE BLD GHB EST-MCNC: 174 MG/DL
GLOBULIN SER CALC-MCNC: 4.2 G/DL (ref 2–4)
GLUCOSE BLD STRIP.AUTO-MCNC: 147 MG/DL (ref 65–100)
GLUCOSE SERPL-MCNC: 147 MG/DL (ref 65–100)
HBA1C MFR BLD: 7.7 % (ref 4.2–6.3)
HCT VFR BLD AUTO: 35 % (ref 35–47)
HGB BLD-MCNC: 10.3 G/DL (ref 11.5–16)
IMM GRANULOCYTES # BLD AUTO: 0.2 K/UL (ref 0–0.04)
IMM GRANULOCYTES NFR BLD AUTO: 1 % (ref 0–0.5)
LACTATE BLD-SCNC: 2.37 MMOL/L (ref 0.4–2)
LYMPHOCYTES # BLD: 2.9 K/UL (ref 0.8–3.5)
LYMPHOCYTES NFR BLD: 14 % (ref 12–49)
MCH RBC QN AUTO: 24.9 PG (ref 26–34)
MCHC RBC AUTO-ENTMCNC: 29.4 G/DL (ref 30–36.5)
MCV RBC AUTO: 84.7 FL (ref 80–99)
MONOCYTES # BLD: 1.5 K/UL (ref 0–1)
MONOCYTES NFR BLD: 7 % (ref 5–13)
NEUTS SEG # BLD: 16 K/UL (ref 1.8–8)
NEUTS SEG NFR BLD: 77 % (ref 32–75)
NRBC # BLD: 0 K/UL (ref 0–0.01)
NRBC BLD-RTO: 0 PER 100 WBC
PLATELET # BLD AUTO: 393 K/UL (ref 150–400)
PMV BLD AUTO: 10.2 FL (ref 8.9–12.9)
POTASSIUM SERPL-SCNC: 3.1 MMOL/L (ref 3.5–5.1)
PROT SERPL-MCNC: 7.1 G/DL (ref 6.4–8.2)
RBC # BLD AUTO: 4.13 M/UL (ref 3.8–5.2)
SERVICE CMNT-IMP: ABNORMAL
SODIUM SERPL-SCNC: 137 MMOL/L (ref 136–145)
WBC # BLD AUTO: 20.8 K/UL (ref 3.6–11)

## 2019-05-17 PROCEDURE — 87040 BLOOD CULTURE FOR BACTERIA: CPT

## 2019-05-17 PROCEDURE — 36415 COLL VENOUS BLD VENIPUNCTURE: CPT

## 2019-05-17 PROCEDURE — 99284 EMERGENCY DEPT VISIT MOD MDM: CPT

## 2019-05-17 PROCEDURE — 74011250636 HC RX REV CODE- 250/636: Performed by: INTERNAL MEDICINE

## 2019-05-17 PROCEDURE — 74011000250 HC RX REV CODE- 250: Performed by: INTERNAL MEDICINE

## 2019-05-17 PROCEDURE — 85025 COMPLETE CBC W/AUTO DIFF WBC: CPT

## 2019-05-17 PROCEDURE — 87077 CULTURE AEROBIC IDENTIFY: CPT

## 2019-05-17 PROCEDURE — 83036 HEMOGLOBIN GLYCOSYLATED A1C: CPT

## 2019-05-17 PROCEDURE — 96374 THER/PROPH/DIAG INJ IV PUSH: CPT

## 2019-05-17 PROCEDURE — 74011250636 HC RX REV CODE- 250/636: Performed by: EMERGENCY MEDICINE

## 2019-05-17 PROCEDURE — 82962 GLUCOSE BLOOD TEST: CPT

## 2019-05-17 PROCEDURE — 80053 COMPREHEN METABOLIC PANEL: CPT

## 2019-05-17 PROCEDURE — 87186 SC STD MICRODIL/AGAR DIL: CPT

## 2019-05-17 PROCEDURE — 74011250637 HC RX REV CODE- 250/637: Performed by: INTERNAL MEDICINE

## 2019-05-17 PROCEDURE — 87205 SMEAR GRAM STAIN: CPT

## 2019-05-17 PROCEDURE — 65270000029 HC RM PRIVATE

## 2019-05-17 PROCEDURE — 87147 CULTURE TYPE IMMUNOLOGIC: CPT

## 2019-05-17 PROCEDURE — 83605 ASSAY OF LACTIC ACID: CPT

## 2019-05-17 RX ORDER — MELATONIN
1000 DAILY
Status: DISCONTINUED | OUTPATIENT
Start: 2019-05-18 | End: 2019-05-24 | Stop reason: HOSPADM

## 2019-05-17 RX ORDER — CLOPIDOGREL BISULFATE 75 MG/1
75 TABLET ORAL DAILY
Status: DISCONTINUED | OUTPATIENT
Start: 2019-05-18 | End: 2019-05-24 | Stop reason: HOSPADM

## 2019-05-17 RX ORDER — DEXTROSE 50 % IN WATER (D50W) INTRAVENOUS SYRINGE
12.5-25 AS NEEDED
Status: DISCONTINUED | OUTPATIENT
Start: 2019-05-17 | End: 2019-05-24 | Stop reason: HOSPADM

## 2019-05-17 RX ORDER — DULOXETIN HYDROCHLORIDE 30 MG/1
60 CAPSULE, DELAYED RELEASE ORAL DAILY
Status: DISCONTINUED | OUTPATIENT
Start: 2019-05-18 | End: 2019-05-24 | Stop reason: HOSPADM

## 2019-05-17 RX ORDER — MAGNESIUM SULFATE 100 %
4 CRYSTALS MISCELLANEOUS AS NEEDED
Status: DISCONTINUED | OUTPATIENT
Start: 2019-05-17 | End: 2019-05-24 | Stop reason: HOSPADM

## 2019-05-17 RX ORDER — INSULIN LISPRO 100 [IU]/ML
INJECTION, SOLUTION INTRAVENOUS; SUBCUTANEOUS
Status: DISCONTINUED | OUTPATIENT
Start: 2019-05-17 | End: 2019-05-24 | Stop reason: HOSPADM

## 2019-05-17 RX ORDER — PANTOPRAZOLE SODIUM 20 MG/1
20 TABLET, DELAYED RELEASE ORAL
Status: DISCONTINUED | OUTPATIENT
Start: 2019-05-18 | End: 2019-05-22

## 2019-05-17 RX ORDER — ACETAMINOPHEN 325 MG/1
650 TABLET ORAL
Status: DISCONTINUED | OUTPATIENT
Start: 2019-05-17 | End: 2019-05-24 | Stop reason: HOSPADM

## 2019-05-17 RX ORDER — SODIUM CHLORIDE 0.9 % (FLUSH) 0.9 %
5-10 SYRINGE (ML) INJECTION AS NEEDED
Status: DISCONTINUED | OUTPATIENT
Start: 2019-05-17 | End: 2019-05-24 | Stop reason: HOSPADM

## 2019-05-17 RX ORDER — ONDANSETRON 2 MG/ML
4 INJECTION INTRAMUSCULAR; INTRAVENOUS
Status: DISCONTINUED | OUTPATIENT
Start: 2019-05-17 | End: 2019-05-24 | Stop reason: HOSPADM

## 2019-05-17 RX ORDER — MORPHINE SULFATE 4 MG/ML
2 INJECTION INTRAVENOUS
Status: COMPLETED | OUTPATIENT
Start: 2019-05-17 | End: 2019-05-17

## 2019-05-17 RX ORDER — MONTELUKAST SODIUM 10 MG/1
10 TABLET ORAL
Status: DISCONTINUED | OUTPATIENT
Start: 2019-05-17 | End: 2019-05-24 | Stop reason: HOSPADM

## 2019-05-17 RX ORDER — PHENOL/SODIUM PHENOLATE
20 AEROSOL, SPRAY (ML) MUCOUS MEMBRANE 2 TIMES DAILY
COMMUNITY
End: 2021-01-01

## 2019-05-17 RX ORDER — SODIUM CHLORIDE 0.9 % (FLUSH) 0.9 %
5-40 SYRINGE (ML) INJECTION EVERY 8 HOURS
Status: DISCONTINUED | OUTPATIENT
Start: 2019-05-17 | End: 2019-05-24 | Stop reason: HOSPADM

## 2019-05-17 RX ORDER — POTASSIUM CHLORIDE 750 MG/1
40 TABLET, FILM COATED, EXTENDED RELEASE ORAL EVERY 4 HOURS
Status: COMPLETED | OUTPATIENT
Start: 2019-05-17 | End: 2019-05-17

## 2019-05-17 RX ORDER — SODIUM CHLORIDE 0.9 % (FLUSH) 0.9 %
5-40 SYRINGE (ML) INJECTION AS NEEDED
Status: DISCONTINUED | OUTPATIENT
Start: 2019-05-17 | End: 2019-05-24 | Stop reason: HOSPADM

## 2019-05-17 RX ORDER — NALOXONE HYDROCHLORIDE 0.4 MG/ML
0.4 INJECTION, SOLUTION INTRAMUSCULAR; INTRAVENOUS; SUBCUTANEOUS AS NEEDED
Status: DISCONTINUED | OUTPATIENT
Start: 2019-05-17 | End: 2019-05-24 | Stop reason: HOSPADM

## 2019-05-17 RX ORDER — MIRTAZAPINE 15 MG/1
15 TABLET, FILM COATED ORAL
Status: DISCONTINUED | OUTPATIENT
Start: 2019-05-17 | End: 2019-05-24 | Stop reason: HOSPADM

## 2019-05-17 RX ORDER — TRAMADOL HYDROCHLORIDE 50 MG/1
50 TABLET ORAL
Status: DISCONTINUED | OUTPATIENT
Start: 2019-05-17 | End: 2019-05-24 | Stop reason: HOSPADM

## 2019-05-17 RX ADMIN — VANCOMYCIN HYDROCHLORIDE 2500 MG: 10 INJECTION, POWDER, LYOPHILIZED, FOR SOLUTION INTRAVENOUS at 19:56

## 2019-05-17 RX ADMIN — MIRTAZAPINE 15 MG: 15 TABLET, FILM COATED ORAL at 22:58

## 2019-05-17 RX ADMIN — MORPHINE SULFATE 2 MG: 4 INJECTION, SOLUTION INTRAMUSCULAR; INTRAVENOUS at 18:42

## 2019-05-17 RX ADMIN — APIXABAN 5 MG: 5 TABLET, FILM COATED ORAL at 22:58

## 2019-05-17 RX ADMIN — MONTELUKAST SODIUM 10 MG: 10 TABLET, FILM COATED ORAL at 21:18

## 2019-05-17 RX ADMIN — POTASSIUM CHLORIDE 40 MEQ: 750 TABLET, EXTENDED RELEASE ORAL at 20:33

## 2019-05-17 RX ADMIN — Medication 10 ML: at 21:20

## 2019-05-17 RX ADMIN — POTASSIUM CHLORIDE 40 MEQ: 750 TABLET, EXTENDED RELEASE ORAL at 23:46

## 2019-05-17 RX ADMIN — SODIUM CHLORIDE 1000 ML: 900 INJECTION, SOLUTION INTRAVENOUS at 21:20

## 2019-05-17 RX ADMIN — SODIUM CHLORIDE 1000 ML: 900 INJECTION, SOLUTION INTRAVENOUS at 21:18

## 2019-05-17 RX ADMIN — TRAMADOL HYDROCHLORIDE 50 MG: 50 TABLET, FILM COATED ORAL at 22:58

## 2019-05-17 RX ADMIN — WATER 2 G: 1 INJECTION INTRAMUSCULAR; INTRAVENOUS; SUBCUTANEOUS at 20:22

## 2019-05-17 NOTE — ED PROVIDER NOTES
I have evaluated the patient as the Provider in Triage. I have reviewed Her vital signs and the triage nurse assessment. I have talked with the patient and any available family and advised that I am the provider in triage and have ordered the appropriate study to initiate their work up based on the clinical presentation during my assessment. I have advised that the patient will be accommodated in the Main ED as soon as possible. I have also requested to contact the triage nurse or myself immediately if the patient experiences any changes in their condition during this brief waiting period. 72 y.o. female with past medical history significant for h/o DVT, HTN, h/o NSTEMI, DM, GERD and a-fib who presents from [facility] via private vehicle with chief complaint of wound. Per daughter, pt was diagnosed with blood clots in her right arm while she was on Plavix and Xarelto on April 26th. Pt reports that she had a blood clot removed from her right arm on April 27th and has a healing incision on her right forearm. Daughter notes that the surgeon was not able to remove all the blood clots from pt's fingers and she will soon have a couple of her fingers amputated. She states that the pt has been on Eliquis and Plavix since the diagnosis. Pt reports that her right arm was fine till yesterday, however, her arm has been red and swollen since this morning and her wound has been draining puss all day. Daughter mentions that she talked to [facility] today and was told to bring the pt into the ED. Pt denies use of antibiotics. Pt denies fever. There are no other acute medical concerns at this time. Social hx: Former Smoker; Rare use of EtOH 
PCP: López Peng MD 
 
Note written by Sandra Mac, as dictated by Stu Blackmon MD 6:08 PM 
 
 
 
The history is provided by the patient. No  was used. Past Medical History:  
Diagnosis Date  Asthma  Atrial fibrillation ([address] 2018  Autoimmune disease ([address]   
 fibromyalgia  CAD (coronary artery disease) 10/9/2015  Closed wedge compression fracture of T7 vertebra ([address] 2018  Diabetes ([address]  DVT (deep vein thrombosis) in pregnancy ([address]  GERD (gastroesophageal reflux disease)  HTN (hypertension)  NSTEMI (non-ST elevated myocardial infarction) ([address] 10/9/2015  Obesity (BMI 30-39.9) 2018  Sleep apnea   
 wears CPAP Past Surgical History:  
Procedure Laterality Date  ABDOMEN SURGERY PROC UNLISTED    
 hital hernia repair, gall bladder removed  BREAST SURGERY PROCEDURE UNLISTED    
 lumpectomy  CARDIAC SURG PROCEDURE UNLIST  10/9/15  
 2 stents [place]  HX COLOSTOMY    
 and reversal, post episiotomy repair [id number]  HX UROLOGICAL  2009  
 bladder sling Family History:  
Problem Relation Age of Onset  Diabetes Mother  Heart Failure Mother  Kidney Disease Mother  Diabetes Father  Heart Disease Father  Elevated Lipids Father  Heart Failure Father  Heart Disease Brother  Cancer Brother   
     kidney  Diabetes Brother  No Known Problems Brother  No Known Problems Brother Social History Socioeconomic History  Marital status:  Spouse name: Not on file  Number of children: Not on file  Years of education: Not on file  Highest education level: Not on file Occupational History  Not on file Social Needs  Financial resource strain: Not on file  Food insecurity:  
  Worry: Not on file Inability: Not on file  Transportation needs:  
  Medical: Not on file Non-medical: Not on file Tobacco Use  Smoking status: Former Smoker Last attempt to quit: 3/30/2017 Years since quittin.1  Smokeless tobacco: Never Used Substance and Sexual Activity  Alcohol use: No  
  Alcohol/week: 0.0 oz  
 Comment: very very rarely  Drug use: No  
 Sexual activity: Never Lifestyle  Physical activity:  
  Days per week: Not on file Minutes per session: Not on file  Stress: Not on file Relationships  Social connections:  
  Talks on phone: Not on file Gets together: Not on file Attends Ep[employer] service: Not on file Active member of club or organization: Not on file Attends meetings of clubs or organizations: Not on file Relationship status: Not on file  Intimate partner violence:  
  Fear of current or ex partner: Not on file Emotionally abused: Not on file Physically abused: Not on file Forced sexual activity: Not on file Other Topics Concern  Not on file Social History Narrative  Not on file ALLERGIES: Advair diskus [fluticasone propion-salmeterol]; Aspartame; Breo ellipta [fluticasone furoate-vilanterol]; Ciprofloxacin; Statins-hmg-coa reductase inhibitors; Sulfa (sulfonamide antibiotics); Tetracycline; and Zetia [ezetimibe] Review of Systems Constitutional: Negative for activity change, chills and fever. HENT: Negative for nosebleeds, sore throat, trouble swallowing and voice change. Eyes: Negative for visual disturbance. Respiratory: Negative for shortness of breath. Cardiovascular: Negative for chest pain and palpitations. Gastrointestinal: Negative for abdominal pain, constipation, diarrhea and nausea. Genitourinary: Negative for difficulty urinating, dysuria, hematuria and urgency. Musculoskeletal: Negative for back pain, neck pain and neck stiffness. Skin: Positive for color change and wound. Allergic/Immunologic: Negative for immunocompromised state. Neurological: Negative for dizziness, seizures, syncope, weakness, light-headedness, numbness and headaches. Psychiatric/Behavioral: Negative for behavioral problems, confusion, hallucinations, self-injury and suicidal ideas. All other systems reviewed and are negative. There were no vitals filed for this visit. Physical Exam  
Constitutional: She is oriented to person, place, and time. She appears well-developed. No distress. Morbidly obese. HENT:  
Head: Normocephalic. Eyes: Pupils are equal, round, and reactive to light. Neck: Normal range of motion. Neck supple. Cardiovascular: Regular rhythm and normal heart sounds. Tachycardia present. Exam reveals no gallop and no friction rub. No murmur heard. Pulmonary/Chest: Effort normal and breath sounds normal. No respiratory distress. She has no wheezes. Abdominal: Soft. Bowel sounds are normal. She exhibits no distension. There is no tenderness. There is no rebound and no guarding. Musculoskeletal: Normal range of motion. Neurological: She is alert and oriented to person, place, and time. Skin: Skin is warm. No rash noted. She is not diaphoretic. Surgical incision on RUE with surrounding erythema and drainage. Tenderness to touch. Psychiatric: She has a normal mood and affect. Her behavior is normal. Judgment and thought content normal.  
Nursing note and vitals reviewed. Note written by Sandra Kam, as dictated by Elif Alicea MD 6:40 PM  
 
 
MDM This is a 79-year-old female with past medical history, review of systems, physical exam as above, presenting with complaints of worsening pain, drainage, erythema, and a right upper extremity in the context of recent thrombectomy, dry gangrene in the right upper extremity. Patient states the pain, erythema, drainage began yesterday. He was recently evaluated by orthopedics, is scheduled for amputation of digits of the right hand next week.  Physical exam is remarkable for chronically ill-appearing elderly female, in mild to moderate discomfort, noted to be tachycardic, with gangrenous digits of the right upper extremity, right upper arm, with incision site erythematous, with scant drainage, tender to palpation, patient noted to be tachycardic, with clear breath sounds, soft abdomen. Suspect cellulitis, plan to obtain CMP, CBC, blood cultures, lactic acid. We'll provide pain control, empiric advise, and consult the hospitalist for admission. Procedures Progress Note:  Patient discussed with Dr. Stanton Cordero, he will evaluate the patient for admission.

## 2019-05-17 NOTE — H&P
[address] 
[phone] Admission History and Physical 
 
 
NAME:              Rmaesh Desir :   1945 MRN:  [id number] PCP:  Nadia Fountain MD  
 
Date:     2019 Chief  Complaint: Swelling and discharge from wound right upper extremity History Of Presenting Illness: Ms. Chani Mckeon is a 72 y.o. female who is being admitted for cellulitis of right upper arm. Ms. Chani Mckeon presented to our Emergency Department today complaining of a progressive swelling, redness and discharge from surgery wounds following a thrombectomy of right arm including brachial, radial and ulnar arteries with wrist exploration done recently. She denies any fever or chills. Pain seems worse with movement but stable when arm is rested. She also has gangrene three finger right hand for which she was to have amputations next week. She will be admitted for further management. Allergies Allergen Reactions  Advair Diskus [Fluticasone Propion-Salmeterol] Rash  Aspartame Other (comments)  Breo Ellipta [Fluticasone Furoate-Vilanterol] Other (comments)  Ciprofloxacin Rash  Statins-Hmg-Coa Reductase Inhibitors Other (comments) Muscle pain Lipitor/crestor/zocor  Sulfa (Sulfonamide Antibiotics) Rash  Tetracycline Other (comments) musclepain  Zetia [Ezetimibe] Myalgia Prior to Admission medications Medication Sig Start Date End Date Taking? Authorizing Provider  
apixaban (ELIQUIS) 5 mg tablet Take 2 Tabs by mouth two (2) times a day. 19  Yes Lance Herr MD  
ibandronate (BONIVA) 150 mg tablet Take 150 mg by mouth every thirty (30) days. Last dose 19   Yes Provider, Historical  
lisinopril (PRINIVIL, ZESTRIL) 5 mg tablet Take 5 mg by mouth daily.    Yes Provider, Historical  
promethazine (PHENERGAN) 25 mg tablet Take 25 mg by mouth every eight (8) hours as needed for Nausea (Nausea not relieved by ondansetron). Yes Provider, Historical  
metoprolol tartrate (LOPRESSOR) 25 mg tablet Take 25 mg by mouth two (2) times a day. Hold if SBP <100 mmHg   Yes Provider, Historical  
loperamide (IMODIUM) 2 mg capsule Take 2 mg by mouth daily as needed for Diarrhea. Yes Provider, Historical  
mirtazapine (REMERON) 15 mg tablet Take 15 mg by mouth nightly. Yes Provider, Historical  
predniSONE (DELTASONE) 10 mg tablet Take 10 mg by mouth two (2) times a day. Yes Provider, Historical  
potassium chloride SR (KLOR-CON 10) 10 mEq tablet Take 10 mEq by mouth two (2) times daily (with meals). Yes Provider, Historical  
magnesium oxide (MAG-OX) 400 mg tablet Take 1 Tab by mouth daily. 12/4/18  Yes Magali Coto MD  
ondansetron (ZOFRAN ODT) 4 mg disintegrating tablet Take 1 Tab by mouth every eight (8) hours as needed. 11/30/18  Yes Hayde Sanchez MD  
tiotropium (SPIRIVA WITH HANDIHALER) 18 mcg inhalation capsule Take 1 Cap by inhalation daily. Yes Provider, Historical  
albuterol (PROVENTIL VENTOLIN) 2.5 mg /3 mL (0.083 %) nebulizer solution 2.5 mg by Nebulization route every six (6) hours as needed for Wheezing or Shortness of Breath. Yes Provider, Historical  
montelukast (SINGULAIR) 10 mg tablet Take 10 mg by mouth nightly. Yes Provider, Historical  
albuterol (PROAIR HFA) 90 mcg/actuation inhaler Take 1 Puff by inhalation every four (4) hours as needed. Yes Provider, Historical  
clopidogrel (PLAVIX) 75 mg tab Take 75 mg by mouth daily. Yes Provider, Historical  
lactobacillus rhamnosus gg 10 billion cell (CULTURELLE) 10 billion cell capsule Take 1 Cap by mouth daily. Yes Provider, Historical  
acetaminophen (TYLENOL) 325 mg tablet Take 650 mg by mouth every six (6) hours as needed for Pain (Mild pain).    Yes Provider, Historical  
 biotin 10,000 mcg cap Take 1 Cap by mouth daily. Yes Provider, Historical  
saxagliptin (ONGLYZA) 5 mg tab tablet Take 5 mg by mouth daily. Yes Provider, Historical  
DULoxetine (CYMBALTA) 60 mg capsule Take 60 mg by mouth daily. Yes Provider, Historical  
cholecalciferol (VITAMIN D3) 1,000 unit tablet Take 1,000 Units by mouth daily. Yes Provider, Historical  
azelastine-fluticasone (DYMISTA) 137-50 mcg/spray spry 1 [place] by Nasal route daily. Yes Provider, Historical  
mometasone-formoterol (DULERA) 200-5 mcg/actuation HFA inhaler Take 2 Puffs by inhalation two (2) times a day. Yes Provider, Historical  
torsemide (DEMADEX) 20 mg tablet Take 20 mg by mouth daily as needed for Other (Lower Extremity Edema). Indications: visible water retention    Provider, Historical  
traMADol (ULTRAM) 50 mg tablet Take 50 mg by mouth every eight (8) hours as needed for Pain (Moderate Pain). Provider, Historical  
HYDROcodone-acetaminophen (NORCO) 5-325 mg per tablet Take 1 Tab by mouth every six (6) hours as needed for Pain (Severe Pain). Provider, Historical  
pantoprazole (PROTONIX) 20 mg tablet Take 20 mg by mouth Daily (before dinner). Provider, Historical  
 
 
Past Medical History:  
Diagnosis Date  Asthma  Atrial fibrillation ([facility] 11/16/2018  Autoimmune disease ([facility]   
 fibromyalgia  CAD (coronary artery disease) 10/9/2015  Closed wedge compression fracture of T7 vertebra ([facility] 11/13/2018  Diabetes ([facility]  DVT (deep vein thrombosis) in pregnancy ([facility]  GERD (gastroesophageal reflux disease)  HTN (hypertension)  NSTEMI (non-ST elevated myocardial infarction) ([facility] 10/9/2015  Obesity (BMI 30-39.9) 11/13/2018  Sleep apnea   
 wears CPAP Past Surgical History:  
Procedure Laterality Date  ABDOMEN SURGERY PROC UNLISTED    
 hital hernia repair, gall bladder removed  BREAST SURGERY PROCEDURE UNLISTED    
 lumpectomy  CARDIAC SURG PROCEDURE UNLIST  10/9/15  
 2 stents [facility]  HX COLOSTOMY    
 and reversal, post episiotomy repair [address] [facility] UROLOGICAL  2009  
 bladder sling Social History Tobacco Use  Smoking status: Former Smoker Last attempt to quit: 3/30/2017 Years since quittin.1  Smokeless tobacco: Never Used Substance Use Topics  Alcohol use: No  
  Alcohol/week: 0.0 oz  
  Comment: very very rarely Family History Problem Relation Age of Onset  Diabetes Mother  Heart Failure Mother  Kidney Disease Mother  Diabetes Father  Heart Disease Father  Elevated Lipids Father  Heart Failure Father  Heart Disease Brother  Cancer Brother   
     kidney  Diabetes Brother  No Known Problems Brother  No Known Problems Brother Review of Systems: 
Constitutional ROS: no fever, chills, rigors or night sweats Respiratory ROS: no cough, sputum, hemoptysis, dyspnea or pleuritic pain. Cardiovascular ROS: no chest pain, palpitations, orthopnea, PND or syncope Endocrine ROS: no polydispsia, polyuria, heat or cold intolerance or major weight change. Gastrointestinal ROS: no dysphagia, abdominal pain, nausea, vomiting or diarrhea Genito-Urinary ROS: no dysuria, frequency, hematuria, retention or flank pain Musculoskeletal ROS: no joint pain but right upper extremity swelling and pain as noted above Neurological ROS: no headache, confusion, focal weakness or any other neurological symptoms Psychiatric ROS: no depression, anxiety, mood swings Dermatological ROS: no rash, pruritis, or urticaria Heme-Lymph ROS: no swollen glands, bleeding Examination: 
 
Constitutional:   
Visit Vitals /56 (BP 1 Location: Left arm, BP Patient Position: Sitting) Pulse (!) 126 Temp 97.5 °F (36.4 °C) Resp 20 SpO2 96% General:  Weak and ill looking patient in no acute distress Eyes: Pink conjunctivae, PERRLA with no discharge. Normal eye movements Ear, Nose, Mouth & Throat: No ottorrhea, rhinorrhea, non tender sinuses, dry mucous membranes Respiratory:  No accessory muscle use, clear breath sounds without crackles or wheezes Cardiovascular:  No JVD or murmurs, sinus tachycardia, without thrills, bruits or peripheral edema. Capillary refil+, good distal pulses GI & :  Soft abdomen, non-tender, non-distended, normoactive bowel sounds with no palpable organomegaly Heme:  No cervical or axillary adenopathy. Musculoskeletal:  No cyanosis, clubbing, atrophy or deformities Skin: cellulitis with purulent discharge from surgical sites right cubital fossa and distal radial forearm. Gangrenous index, mid and ring fingers right hand Neurological: Awake and alert, speech is clear, CNs 2-12 are grossly intact and otherwise non focal 
Psychiatric:  Has a fair insight and is oriented x 3 
________________________________________________________________________ Data Review: 
 
Labs: 
 
Recent Labs 05/17/19 
1822 WBC 20.8* HGB 10.3* HCT 35.0  
 Recent Labs 05/17/19 
1822   
K 3.1*  
 CO2 24 * BUN 26* CREA 1.17* CA 8.6 ALB 2.9*  
SGOT 19 ALT 37 No components found for: [place] No results for input(s): PH, PCO2, PO2, HCO3, FIO2 in the last 72 hours. No results for input(s): INR in the last 72 hours. No lab exists for component: INREXT I have also reviewed available old medical records. Assessment & Impression: Ms. Nivia Tijerina is a 72 y.o. female being evaluated for:  
 
Principal Problem: 
  Cellulitis of right upper arm (5/17/2019) Active Problems: 
  Type II diabetes mellitus ([facility] (10/9/2015) Coronary artery disease involving native coronary artery without angina pectoris (1/22/2016) Essential hypertension (1/22/2016) Recurrent deep vein thrombosis (DVT) ([facility] (3/30/2018) Chronic anticoagulation (3/30/2018) Type 2 diabetes with nephropathy ([address] (10/1/2018) Obesity (BMI 30-39.9) (11/13/2018) Atrial fibrillation ([address] (11/16/2018) Mild intermittent asthma (5/17/2019) Gangrene of finger of right hand ([address] (5/17/2019) Plan of management: 
 
Cellulitis of right upper arm (5/17/2019): infected and purulent recent surgical wounds. Admit to hospital. Obtain blood and wound cultures. Start IV cefepime and Vancomycin. Pain control. Wound care consult Severe sepsis due to cellulitis POA: she has SIRS criteria with an elevated lactate in the setting of cellulitis. Continue management as above and follow Type II diabetes mellitus ([address] (10/9/2015) / Type 2 diabetes with nephropathy ([address] (10/1/2018): check A1c. BG stable. SSi per protocol. Resume Saxagliptin. Hypokalemia: will replete and follow K+ in AM 
 
Coronary artery disease involving native coronary artery without angina pectoris (1/22/2016): resume cardiac medications once verified Essential hypertension (1/22/2016): BP stable. Review home medications once verified. May need to hold medications overnight and resume in AM 
 
Recurrent deep vein thrombosis (DVT) ([address] (3/30/2018) / Chronic anticoagulation (3/30/2018): resume Eliquis Gangrene of finger of right hand ([address] (5/17/2019): involving right index, mid and ring fingers. Consult ortho who had already planned for surgery next week Obesity (BMI 30-39.9) (11/13/2018): nutritional consult Atrial fibrillation ([address] (11/16/2018): resume Metoprolol, Eliquis Mild intermittent asthma (5/17/2019): not wheezing. Nebs as needed Code Status:  Full Surrogate decision maker: Family Risk of deterioration: high Total time spent for the care of the patient: 79 Minutes Care Plan discussed with: Patient, Nursing Staff, ED physician and Dr Juan Lo who will assume further care Discussed:  Code Status, Care Plan and D/C Planning Prophylaxis:  Eliquis Probable Disposition:  Home w/Family 
        
___________________________________________________ Attending Physician: Ching Weinberg MD

## 2019-05-17 NOTE — ED TRIAGE NOTES
Pt had blood clot removed on right antecubital arm approx 2 weeks ago. Pt is scheduled for amputation of three fingers on right hand; fingers are black and necrotic. Pt reports increased redness, swelling, and purulent serous drainage from incision site today.

## 2019-05-17 NOTE — ED NOTES
Bedside and Verbal shift change report given to Gean Peabody RN (oncoming nurse) by Ursula RN (offgoing nurse). Report included the following information SBAR, Kardex, Intake/Output, Recent Results and Cardiac Rhythm NSR.

## 2019-05-18 LAB
ALBUMIN SERPL-MCNC: 2.3 G/DL (ref 3.5–5)
ALBUMIN/GLOB SERPL: 0.7 {RATIO} (ref 1.1–2.2)
ALP SERPL-CCNC: 181 U/L (ref 45–117)
ALT SERPL-CCNC: 28 U/L (ref 12–78)
ANION GAP SERPL CALC-SCNC: 5 MMOL/L (ref 5–15)
AST SERPL-CCNC: 12 U/L (ref 15–37)
BASOPHILS # BLD: 0 K/UL (ref 0–0.1)
BASOPHILS NFR BLD: 0 % (ref 0–1)
BILIRUB SERPL-MCNC: 0.2 MG/DL (ref 0.2–1)
BUN SERPL-MCNC: 23 MG/DL (ref 6–20)
BUN/CREAT SERPL: 25 (ref 12–20)
CALCIUM SERPL-MCNC: 7.5 MG/DL (ref 8.5–10.1)
CHLORIDE SERPL-SCNC: 115 MMOL/L (ref 97–108)
CO2 SERPL-SCNC: 22 MMOL/L (ref 21–32)
CREAT SERPL-MCNC: 0.92 MG/DL (ref 0.55–1.02)
DIFFERENTIAL METHOD BLD: ABNORMAL
EOSINOPHIL # BLD: 0.3 K/UL (ref 0–0.4)
EOSINOPHIL NFR BLD: 2 % (ref 0–7)
ERYTHROCYTE [DISTWIDTH] IN BLOOD BY AUTOMATED COUNT: 15.9 % (ref 11.5–14.5)
GLOBULIN SER CALC-MCNC: 3.4 G/DL (ref 2–4)
GLUCOSE BLD STRIP.AUTO-MCNC: 108 MG/DL (ref 65–100)
GLUCOSE BLD STRIP.AUTO-MCNC: 115 MG/DL (ref 65–100)
GLUCOSE BLD STRIP.AUTO-MCNC: 123 MG/DL (ref 65–100)
GLUCOSE SERPL-MCNC: 124 MG/DL (ref 65–100)
HCT VFR BLD AUTO: 28.8 % (ref 35–47)
HGB BLD-MCNC: 8.2 G/DL (ref 11.5–16)
IMM GRANULOCYTES # BLD AUTO: 0.1 K/UL (ref 0–0.04)
IMM GRANULOCYTES NFR BLD AUTO: 1 % (ref 0–0.5)
LACTATE SERPL-SCNC: 1.2 MMOL/L (ref 0.4–2)
LYMPHOCYTES # BLD: 2.2 K/UL (ref 0.8–3.5)
LYMPHOCYTES NFR BLD: 17 % (ref 12–49)
MCH RBC QN AUTO: 24.3 PG (ref 26–34)
MCHC RBC AUTO-ENTMCNC: 28.5 G/DL (ref 30–36.5)
MCV RBC AUTO: 85.5 FL (ref 80–99)
MONOCYTES # BLD: 1.2 K/UL (ref 0–1)
MONOCYTES NFR BLD: 9 % (ref 5–13)
NEUTS SEG # BLD: 9.1 K/UL (ref 1.8–8)
NEUTS SEG NFR BLD: 71 % (ref 32–75)
NRBC # BLD: 0 K/UL (ref 0–0.01)
NRBC BLD-RTO: 0 PER 100 WBC
PLATELET # BLD AUTO: 282 K/UL (ref 150–400)
PMV BLD AUTO: 10.3 FL (ref 8.9–12.9)
POTASSIUM SERPL-SCNC: 4.2 MMOL/L (ref 3.5–5.1)
PROT SERPL-MCNC: 5.7 G/DL (ref 6.4–8.2)
RBC # BLD AUTO: 3.37 M/UL (ref 3.8–5.2)
RBC MORPH BLD: ABNORMAL
RBC MORPH BLD: ABNORMAL
SERVICE CMNT-IMP: ABNORMAL
SODIUM SERPL-SCNC: 142 MMOL/L (ref 136–145)
WBC # BLD AUTO: 12.9 K/UL (ref 3.6–11)

## 2019-05-18 PROCEDURE — 82962 GLUCOSE BLOOD TEST: CPT

## 2019-05-18 PROCEDURE — 74011250636 HC RX REV CODE- 250/636: Performed by: INTERNAL MEDICINE

## 2019-05-18 PROCEDURE — 65270000029 HC RM PRIVATE

## 2019-05-18 PROCEDURE — 83605 ASSAY OF LACTIC ACID: CPT

## 2019-05-18 PROCEDURE — 74011250636 HC RX REV CODE- 250/636: Performed by: FAMILY MEDICINE

## 2019-05-18 PROCEDURE — 36415 COLL VENOUS BLD VENIPUNCTURE: CPT

## 2019-05-18 PROCEDURE — 85025 COMPLETE CBC W/AUTO DIFF WBC: CPT

## 2019-05-18 PROCEDURE — 74011250637 HC RX REV CODE- 250/637: Performed by: INTERNAL MEDICINE

## 2019-05-18 PROCEDURE — 74011000258 HC RX REV CODE- 258: Performed by: INTERNAL MEDICINE

## 2019-05-18 PROCEDURE — 80053 COMPREHEN METABOLIC PANEL: CPT

## 2019-05-18 PROCEDURE — 74011250637 HC RX REV CODE- 250/637: Performed by: FAMILY MEDICINE

## 2019-05-18 RX ORDER — HYDROCODONE BITARTRATE AND ACETAMINOPHEN 5; 325 MG/1; MG/1
1 TABLET ORAL
Status: DISCONTINUED | OUTPATIENT
Start: 2019-05-18 | End: 2019-05-23

## 2019-05-18 RX ORDER — MORPHINE SULFATE 4 MG/ML
1 INJECTION INTRAVENOUS
Status: DISCONTINUED | OUTPATIENT
Start: 2019-05-18 | End: 2019-05-24 | Stop reason: HOSPADM

## 2019-05-18 RX ADMIN — TRAMADOL HYDROCHLORIDE 50 MG: 50 TABLET, FILM COATED ORAL at 20:15

## 2019-05-18 RX ADMIN — ACETAMINOPHEN 650 MG: 325 TABLET ORAL at 02:23

## 2019-05-18 RX ADMIN — Medication 10 ML: at 22:19

## 2019-05-18 RX ADMIN — HYDROCODONE BITARTRATE AND ACETAMINOPHEN 1 TABLET: 5; 325 TABLET ORAL at 22:23

## 2019-05-18 RX ADMIN — HYDROCODONE BITARTRATE AND ACETAMINOPHEN 1 TABLET: 5; 325 TABLET ORAL at 09:03

## 2019-05-18 RX ADMIN — CEFEPIME 2 G: 2 INJECTION, POWDER, FOR SOLUTION INTRAVENOUS at 13:18

## 2019-05-18 RX ADMIN — MONTELUKAST SODIUM 10 MG: 10 TABLET, FILM COATED ORAL at 22:18

## 2019-05-18 RX ADMIN — APIXABAN 5 MG: 5 TABLET, FILM COATED ORAL at 20:15

## 2019-05-18 RX ADMIN — VITAMIN D, TAB 1000IU (100/BT) 1000 UNITS: 25 TAB at 08:45

## 2019-05-18 RX ADMIN — CEFEPIME 2 G: 2 INJECTION, POWDER, FOR SOLUTION INTRAVENOUS at 04:51

## 2019-05-18 RX ADMIN — MORPHINE SULFATE 1 MG: 4 INJECTION, SOLUTION INTRAMUSCULAR; INTRAVENOUS at 04:57

## 2019-05-18 RX ADMIN — Medication 10 ML: at 06:00

## 2019-05-18 RX ADMIN — SODIUM CHLORIDE 402 ML: 900 INJECTION, SOLUTION INTRAVENOUS at 00:15

## 2019-05-18 RX ADMIN — MORPHINE SULFATE 1 MG: 4 INJECTION, SOLUTION INTRAMUSCULAR; INTRAVENOUS at 00:48

## 2019-05-18 RX ADMIN — MIRTAZAPINE 15 MG: 15 TABLET, FILM COATED ORAL at 22:18

## 2019-05-18 RX ADMIN — APIXABAN 5 MG: 5 TABLET, FILM COATED ORAL at 08:45

## 2019-05-18 RX ADMIN — VANCOMYCIN HYDROCHLORIDE 1750 MG: 10 INJECTION, POWDER, LYOPHILIZED, FOR SOLUTION INTRAVENOUS at 08:45

## 2019-05-18 RX ADMIN — CLOPIDOGREL 75 MG: 75 TABLET, FILM COATED ORAL at 08:45

## 2019-05-18 RX ADMIN — VANCOMYCIN HYDROCHLORIDE 1750 MG: 10 INJECTION, POWDER, LYOPHILIZED, FOR SOLUTION INTRAVENOUS at 20:29

## 2019-05-18 RX ADMIN — Medication 1 CAPSULE: at 08:45

## 2019-05-18 RX ADMIN — DULOXETINE HYDROCHLORIDE 60 MG: 30 CAPSULE, DELAYED RELEASE ORAL at 08:45

## 2019-05-18 RX ADMIN — SODIUM CHLORIDE 1000 ML: 900 INJECTION, SOLUTION INTRAVENOUS at 00:15

## 2019-05-18 RX ADMIN — HYDROCODONE BITARTRATE AND ACETAMINOPHEN 1 TABLET: 5; 325 TABLET ORAL at 15:55

## 2019-05-18 RX ADMIN — CEFEPIME 2 G: 2 INJECTION, POWDER, FOR SOLUTION INTRAVENOUS at 20:15

## 2019-05-18 RX ADMIN — Medication 10 ML: at 14:00

## 2019-05-18 RX ADMIN — HYDROCODONE BITARTRATE AND ACETAMINOPHEN 1 TABLET: 5; 325 TABLET ORAL at 02:36

## 2019-05-18 NOTE — PROGRESS NOTES
[address] Dosing Services: Antimicrobial Stewardship Progress Note Consult for antibiotic dosing of vancomycin by Dr. Edna Bowie Pharmacist reviewed antibiotic appropriateness for 72year old , female  for indication of sepsis in the setting cellulitis post surgery Day of Therapy 1 Plan: 
Vancomycin therapy: 
Start Vancomycin therapy, with loading dose of 2500 mg IV x 1 in ED Follow with maintenance dose of 1750 mg IV Q12H Dose calculated to approximate a therapeutic trough of 15-20 mcg/mL. Last trough level / Plan for level: prior to 4th dose (not yet ordered) Pharmacy to follow daily and will make changes to dose and/or frequency based on clinical status. Daily SCr ordered Date Dose & Interval Measured (mcg/mL) Extrapolated (mcg/mL) ? ? ? ?  
? ? ? ?  
? ? ? ? Other Antimicrobial 
(not dosed by pharmacist) Cefepime 2 gm IV Q8H Cultures 5/17 Blood x 2 - (pending) 5/17 Wound - (pending) Serum Creatinine Lab Results Component Value Date/Time Creatinine 1.17 (H) 05/17/2019 06:22 PM  
 Creatinine (POC) 1.2 10/12/2018 04:14 PM  
 
   
Creatinine Clearance Estimated Creatinine Clearance: 57.3 mL/min (A) (based on SCr of 1.17 mg/dL (H)). Temp 98.2 °F (36.8 °C) WBC Lab Results Component Value Date/Time WBC 20.8 (H) 05/17/2019 06:22 PM  
 
   
H/H Lab Results Component Value Date/Time HGB 10.3 (L) 05/17/2019 06:22 PM  
 
  
 
Platelets Lab Results Component Value Date/Time PLATELET 719 14/30/3417 06:22 PM  
 
  
 
 
Thank you for the consult, 
Misha Zimmerman D, [address]

## 2019-05-18 NOTE — PROGRESS NOTES
[facility]: MED RECONCILIATION Comments/Recommendations:  
? Reviewed PTA medications with patient and daughter at bedside. This pharmacist reviewed patient's home medications in 4/2019 and the current list has a few notable changes. Medications adjusted: 
 
· Apixaban 5 mg BID- New medication, patient previously taking rivaroxaban · Metoprolol 25 mg BID- Patient holds this medication if SBP <120 mmHg. Due to chronically low blood pressures she has only been taking this approximately once weekly · Protonix changed to omeprazole 20 mg BID · Torsemide removed from medication list, patient has not taken in \"months\". She does take scheduled potassium twice daily, confirmed dosing. Information obtained from: RxQuery, Patient and daughter at bedside, Recent hospital discharge summary and previous med Hx Allergies: Advair diskus [fluticasone propion-salmeterol]; Aspartame; Breo ellipta [fluticasone furoate-vilanterol]; Ciprofloxacin; Statins-hmg-coa reductase inhibitors; Sulfa (sulfonamide antibiotics); Tetracycline; and Zetia [ezetimibe] Prior to Admission Medications:  
 
Medication Documentation Review Audit Reviewed by Lillian Chamberlain PHARMD (Pharmacist) on 05/17/19 at 2142 Medication Sig Documenting Provider Last Dose Status Taking?  
acetaminophen (TYLENOL) 325 mg tablet Take 650 mg by mouth every six (6) hours as needed for Pain (Mild pain). Provider, Historical 4/17/2019 Active Yes  
albuterol (PROAIR HFA) 90 mcg/actuation inhaler Take 1 Puff by inhalation every four (4) hours as needed. Provider, Historical 5/17/2019 Active Yes  
albuterol (PROVENTIL VENTOLIN) 2.5 mg /3 mL (0.083 %) nebulizer solution 2.5 mg by Nebulization route every six (6) hours as needed for Wheezing or Shortness of Breath. Provider, Historical 5/17/2019 Active Yes  
apixaban (ELIQUIS) 5 mg tablet Take 5 mg by mouth two (2) times a day. Provider, Historical 5/17/2019 Active Yes azelastine-fluticasone (DYMISTA) 137-50 mcg/spray spry 1 [place] by Nasal route daily. Provider, Historical 5/17/2019 Active Yes  
biotin 10,000 mcg cap Take 1 Cap by mouth daily. Provider, Historical 5/17/2019 Active Yes  
cholecalciferol (VITAMIN D3) 1,000 unit tablet Take 1,000 Units by mouth daily. Provider, Historical 5/17/2019 Active Yes  
clopidogrel (PLAVIX) 75 mg tab Take 75 mg by mouth daily. Provider, Historical 5/17/2019 Active Yes DULoxetine (CYMBALTA) 60 mg capsule Take 60 mg by mouth daily. Provider, Historical 5/17/2019 Active Yes  
ibandronate (BONIVA) 150 mg tablet Take 150 mg by mouth every thirty (30) days. Last dose 4/1/19 Provider, Historical 5/1/2019 Active Yes Med Note JULIOCESAR Lee   Fri May 17, 2019  6:32 PM) Pt due next 6/1/19. lactobacillus rhamnosus gg 10 billion cell (CULTURELLE) 10 billion cell capsule Take 1 Cap by mouth daily. Provider, Historical 5/17/2019 Active Yes  
lisinopril (PRINIVIL, ZESTRIL) 5 mg tablet Take 5 mg by mouth daily. Provider, Historical 5/17/2019 Active Yes  
loperamide (IMODIUM) 2 mg capsule Take 2 mg by mouth daily as needed for Diarrhea. Provider, Historical 4/17/2019 Active Yes  
magnesium oxide (MAG-OX) 400 mg tablet Take 1 Tab by mouth daily. Saravanan George MD 5/16/2019 1700 Active Yes  
metoprolol tartrate (LOPRESSOR) 25 mg tablet Take 25 mg by mouth two (2) times a day. Hold if SBP <120 mmHg. Patient states she takes this rarely, usually once weekly due to chronically low SBP. Provider, Historical 5/10/2019 Active Yes  
mirtazapine (REMERON) 15 mg tablet Take 15 mg by mouth nightly. Provider, Historical 5/16/2019 Active Yes  
mometasone-formoterol (DULERA) 200-5 mcg/actuation HFA inhaler Take 2 Puffs by inhalation two (2) times a day. Provider, Historical 5/17/2019 Active Yes  
montelukast (SINGULAIR) 10 mg tablet Take 10 mg by mouth nightly. Provider, Historical 5/16/2019 Active Yes Omeprazole delayed release (PRILOSEC D/R) 20 mg tablet Take 20 mg by mouth two (2) times a day. Provider, Historical 5/17/2019 Active Yes  
ondansetron (ZOFRAN ODT) 4 mg disintegrating tablet Take 1 Tab by mouth every eight (8) hours as needed. Brandy Sanon MD 4/17/2019 Active Yes  
potassium chloride SR (KLOR-CON 10) 10 mEq tablet Take 10 mEq by mouth two (2) times daily (with meals). Provider, Historical 5/17/2019 Active Yes  
predniSONE (DELTASONE) 10 mg tablet Take 10 mg by mouth two (2) times a day. Provider, Historical 5/17/2019 Active Yes  
promethazine (PHENERGAN) 25 mg tablet Take 25 mg by mouth every eight (8) hours as needed for Nausea (Nausea not relieved by ondansetron). Provider, Historical 4/17/2019 Active Yes  
saxagliptin (ONGLYZA) 5 mg tab tablet Take 5 mg by mouth daily. Provider, Historical 5/17/2019 Active Yes  
tiotropium (SPIRIVA WITH HANDIHALER) 18 mcg inhalation capsule Take 1 Cap by inhalation daily. Provider, Historical 5/17/2019 Active Yes Truman Moore, PHARMD   Contact: [phone]

## 2019-05-18 NOTE — CONSULTS
ORTHO CONSULT Subjective:  
 
Date of Consultation:  May 18, 2019 Referring Physician:  Dr. Ulises Miranda Lesli is a 72 y.o. female with Right hand gangrenous fingers. Pt was in hospital recently where she had a Thrombectomy of the right arm with micro embolisms causing dry gangrene to the right index middle and rign fingers. Patient was scheduled for amputation on Monday of the fingers with Dr. Cal Barroso at [facility], admitted last night for cellulitis of the surgical site in upper arm. Denies changes in R hand. No other complaints Patient Active Problem List  
 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  Bowel obstruction ([address] 01/08/2019  Partial small bowel obstruction ([address] 01/05/2019  Sleep apnea 01/05/2019  Atrial fibrillation ([address] 11/16/2018  Dyspnea 11/13/2018  ARF (acute renal failure) ([address] 11/13/2018  Obesity (BMI 30-39.9) 11/13/2018  Closed wedge compression fracture of T7 vertebra ([address] 11/13/2018  Type 2 diabetes with nephropathy ([address] 10/01/2018  Chest pain 06/27/2018  Generalized abdominal pain 06/27/2018  Recurrent deep vein thrombosis (DVT) ([address] 03/30/2018  Chronic anticoagulation 03/30/2018  Coronary artery disease involving native coronary artery without angina pectoris 01/22/2016  Essential hypertension 01/22/2016  Type II diabetes mellitus ([address] 10/09/2015  
 NSTEMI (non-ST elevated myocardial infarction) ([address] 10/09/2015 Family History Problem Relation Age of Onset  Diabetes Mother  Heart Failure Mother  Kidney Disease Mother  Diabetes Father  Heart Disease Father  Elevated Lipids Father  Heart Failure Father  Heart Disease Brother  Cancer Brother   
     kidney  Diabetes Brother  No Known Problems Brother  No Known Problems Brother Social History Tobacco Use  Smoking status: Former Smoker Last attempt to quit: 3/30/2017 Years since quittin.1  Smokeless tobacco: Never Used Substance Use Topics  Alcohol use: No  
  Alcohol/week: 0.0 oz  
  Comment: very very rarely Past Medical History:  
Diagnosis Date  Asthma  Atrial fibrillation ([facility] 2018  Autoimmune disease ([facility]   
 fibromyalgia  CAD (coronary artery disease) 10/9/2015  Closed wedge compression fracture of T7 vertebra ([facility] 2018  Diabetes ([facility]  DVT (deep vein thrombosis) in pregnancy ([facility]  GERD (gastroesophageal reflux disease)  HTN (hypertension)  NSTEMI (non-ST elevated myocardial infarction) ([facility] 10/9/2015  Obesity (BMI 30-39.9) 2018  Sleep apnea   
 wears CPAP Past Surgical History:  
Procedure Laterality Date  ABDOMEN SURGERY PROC UNLISTED    
 hital hernia repair, gall bladder removed  BREAST SURGERY PROCEDURE UNLISTED    
 lumpectomy  CARDIAC SURG PROCEDURE UNLIST  10/9/15  
 2 stents [place]  HX COLOSTOMY    
 and reversal, post episiotomy repair [address]  HX UROLOGICAL  2009  
 bladder sling Prior to Admission medications Medication Sig Start Date End Date Taking? Authorizing Provider Omeprazole delayed release (PRILOSEC D/R) 20 mg tablet Take 20 mg by mouth two (2) times a day. Yes Provider, Historical  
apixaban (ELIQUIS) 5 mg tablet Take 5 mg by mouth two (2) times a day. Yes Provider, Historical  
ibandronate (BONIVA) 150 mg tablet Take 150 mg by mouth every thirty (30) days. Last dose 19   Yes Provider, Historical  
lisinopril (PRINIVIL, ZESTRIL) 5 mg tablet Take 5 mg by mouth daily. Yes Provider, Historical  
promethazine (PHENERGAN) 25 mg tablet Take 25 mg by mouth every eight (8) hours as needed for Nausea (Nausea not relieved by ondansetron).    Yes Provider, Historical  
metoprolol tartrate (LOPRESSOR) 25 mg tablet Take 25 mg by mouth two (2) times a day. Hold if SBP <120 mmHg. Patient states she takes this rarely, usually once weekly due to chronically low SBP. Yes Provider, Historical  
loperamide (IMODIUM) 2 mg capsule Take 2 mg by mouth daily as needed for Diarrhea. Yes Provider, Historical  
mirtazapine (REMERON) 15 mg tablet Take 15 mg by mouth nightly. Yes Provider, Historical  
predniSONE (DELTASONE) 10 mg tablet Take 10 mg by mouth two (2) times a day. Yes Provider, Historical  
potassium chloride SR (KLOR-CON 10) 10 mEq tablet Take 10 mEq by mouth two (2) times daily (with meals). Yes Provider, Historical  
magnesium oxide (MAG-OX) 400 mg tablet Take 1 Tab by mouth daily. 12/4/18  Yes Ketan Evangelista MD  
ondansetron (ZOFRAN ODT) 4 mg disintegrating tablet Take 1 Tab by mouth every eight (8) hours as needed. 11/30/18  Yes Foster-Weiss, Waynetta Dandy, MD  
tiotropium (SPIRIVA WITH HANDIHALER) 18 mcg inhalation capsule Take 1 Cap by inhalation daily. Yes Provider, Historical  
albuterol (PROVENTIL VENTOLIN) 2.5 mg /3 mL (0.083 %) nebulizer solution 2.5 mg by Nebulization route every six (6) hours as needed for Wheezing or Shortness of Breath. Yes Provider, Historical  
montelukast (SINGULAIR) 10 mg tablet Take 10 mg by mouth nightly. Yes Provider, Historical  
albuterol (PROAIR HFA) 90 mcg/actuation inhaler Take 1 Puff by inhalation every four (4) hours as needed. Yes Provider, Historical  
clopidogrel (PLAVIX) 75 mg tab Take 75 mg by mouth daily. Yes Provider, Historical  
lactobacillus rhamnosus gg 10 billion cell (CULTURELLE) 10 billion cell capsule Take 1 Cap by mouth daily. Yes Provider, Historical  
acetaminophen (TYLENOL) 325 mg tablet Take 650 mg by mouth every six (6) hours as needed for Pain (Mild pain). Yes Provider, Historical  
biotin 10,000 mcg cap Take 1 Cap by mouth daily.    Yes Provider, Historical  
 saxagliptin (ONGLYZA) 5 mg tab tablet Take 5 mg by mouth daily. Yes Provider, Historical  
DULoxetine (CYMBALTA) 60 mg capsule Take 60 mg by mouth daily. Yes Provider, Historical  
cholecalciferol (VITAMIN D3) 1,000 unit tablet Take 1,000 Units by mouth daily. Yes Provider, Historical  
azelastine-fluticasone (DYMISTA) 137-50 mcg/spray spry 1 [place] by Nasal route daily. Yes Provider, Historical  
mometasone-formoterol (DULERA) 200-5 mcg/actuation HFA inhaler Take 2 Puffs by inhalation two (2) times a day. Yes Provider, Historical  
 
Current Facility-Administered Medications Medication Dose Route Frequency  morphine injection 1 mg  1 mg IntraVENous Q4H PRN  
 HYDROcodone-acetaminophen (NORCO) 5-325 mg per tablet 1 Tab  1 Tab Oral Q6H PRN  
 sodium chloride (NS) flush 5-40 mL  5-40 mL IntraVENous Q8H  
 sodium chloride (NS) flush 5-40 mL  5-40 mL IntraVENous PRN  
 acetaminophen (TYLENOL) tablet 650 mg  650 mg Oral Q4H PRN  
 naloxone (NARCAN) injection 0.4 mg  0.4 mg IntraVENous PRN  
 ondansetron (ZOFRAN) injection 4 mg  4 mg IntraVENous Q4H PRN  
 cefepime (MAXIPIME) 2 g in 0.9% sodium chloride (MBP/ADV) 100 mL  2 g IntraVENous Q8H  
 sodium chloride (NS) flush 5-10 mL  5-10 mL IntraVENous PRN  
 insulin lispro (HUMALOG) injection   SubCUTAneous AC&HS  
 glucose chewable tablet 16 g  4 Tab Oral PRN  
 dextrose (D50W) injection syrg 12.5-25 g  12.5-25 g IntraVENous PRN  
 glucagon (GLUCAGEN) injection 1 mg  1 mg IntraMUSCular PRN  
 vancomycin (VANCOCIN) 1,750 mg in 0.9% sodium chloride 500 mL IVPB  1,750 mg IntraVENous Q12H  
 apixaban (ELIQUIS) tablet 5 mg  5 mg Oral BID  cholecalciferol (VITAMIN D3) tablet 1,000 Units  1,000 Units Oral DAILY  clopidogrel (PLAVIX) tablet 75 mg  75 mg Oral DAILY  DULoxetine (CYMBALTA) capsule 60 mg  60 mg Oral DAILY  lactobac ac& pc-s.therm-b.anim (NUBIA Q/RISAQUAD)  1 Cap Oral DAILY  mirtazapine (REMERON) tablet 15 mg  15 mg Oral QHS  montelukast (SINGULAIR) tablet 10 mg  10 mg Oral QHS  pantoprazole (PROTONIX) tablet 20 mg  (Patient Supplied)  20 mg Oral ACD  sAXagliptin (ONGLYZA) tablet 5 mg  (Patient Supplied)  5 mg Oral DAILY  traMADol (ULTRAM) tablet 50 mg  50 mg Oral Q8H PRN Allergies Allergen Reactions  Advair Diskus [Fluticasone Propion-Salmeterol] Rash  Aspartame Other (comments)  Breo Ellipta [Fluticasone Furoate-Vilanterol] Other (comments)  Ciprofloxacin Rash  Statins-Hmg-Coa Reductase Inhibitors Other (comments) Muscle pain Lipitor/crestor/zocor  Sulfa (Sulfonamide Antibiotics) Rash  Tetracycline Other (comments) musclepain  Zetia [Ezetimibe] Myalgia Review of Systems:  A comprehensive review of systems was negative except for that written in the HPI. Objective:  
 
Visit Vitals /68 (BP 1 Location: Left arm, BP Patient Position: At rest) Pulse (!) 103 Temp 97.6 °F (36.4 °C) Resp 20 Wt 113.4 kg (250 lb) SpO2 96% BMI 39.16 kg/m² EXAM: 
Alert and Oriented x 3 HEENT: NCAT  
RESP: on CPAP, Nonlaborred breathing, No Acute Respiratory Distress Cards: Regular rate and rhythm Abd: soft NT ND 
SKIN: break down of right arm surgical incisions in distal forearm and upper arm with surrounding erythema and fibrinous material at incisions with purulent/serous drainage. Dressings in place Right Upper Extremity:  
Cellulitis and incision breakdown as above Stable dry gangrene of finger tips and distal fingers. No signs of wet gangrene or infection. No surrounding erythema. Motor intact m/u/r/ain/pin SILT distally in m/u/r distributions Radial pulse 2+ XR Results (most recent): 
Results from Hospital Encounter encounter on 04/26/19 XR CHEST PORT Narrative INDICATION: admit EXAM:  AP CHEST RADIOGRAPH 
 
COMPARISON: January 5, 2019 FINDINGS: 
 
AP portable view of the chest demonstrates a normal cardiomediastinal 
 silhouette. The lungs are adequately expanded. There is no edema, effusion, 
consolidation, or pneumothorax. The osseous structures are unremarkable. Impression IMPRESSION: 
No acute process. Assessment/Plan:  
 
Encounter Diagnoses ICD-10-CM ICD-9-CM 1. Cellulitis of right upper extremity L03.113 682.3  
 
71 yo F s/p microemboli with dry gangrene of index, middle and ring fingers with surgical sight infection from thrombectomy.  
 
-admit to medical service 
-defer management of arm to medicine and vascular. 
-Fingers require no acute intervention. Will alert Dr. oTsha Kay but surgery will be postponed. In presence of cellulitis of the upper arm, will not proceed with elective amputation of the dry gangrenous fingers. Ruby Smith MD 
[facility], [address] [place] [address] 
Cell [phone] Medical Staff : Nadiya Walker Office : [phone]

## 2019-05-18 NOTE — CONSULTS
ORTHO CONSULT Subjective:  
 
Date of Consultation:  May 17, 2019 Referring Physician:  Dr. Connie Reyes Six is a 72 y.o. female we are consulted to see for recommendations for R gangrenous fingers. Pt. Was in hospital recently where she had a Thrombectomy of the R arm with micro embolisms causing dry gangrene to the R 2, 3, and 4 fingers. Pt. Was scheduled for amputation on Monday of the fingers with Dr. Conor Sierra at [facility], admitted tonight for cellulitis of the surgical site in upper arm. Denies changes in R hand. Patient Active Problem List  
 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  Bowel obstruction ([address] 01/08/2019  Partial small bowel obstruction ([address] 01/05/2019  Sleep apnea 01/05/2019  Atrial fibrillation ([address] 11/16/2018  Dyspnea 11/13/2018  ARF (acute renal failure) ([address] 11/13/2018  Obesity (BMI 30-39.9) 11/13/2018  Closed wedge compression fracture of T7 vertebra ([address] 11/13/2018  Type 2 diabetes with nephropathy ([address] 10/01/2018  Chest pain 06/27/2018  Generalized abdominal pain 06/27/2018  Recurrent deep vein thrombosis (DVT) ([address] 03/30/2018  Chronic anticoagulation 03/30/2018  Coronary artery disease involving native coronary artery without angina pectoris 01/22/2016  Essential hypertension 01/22/2016  Type II diabetes mellitus ([address] 10/09/2015  
 NSTEMI (non-ST elevated myocardial infarction) ([address] 10/09/2015 Family History Problem Relation Age of Onset  Diabetes Mother  Heart Failure Mother  Kidney Disease Mother  Diabetes Father  Heart Disease Father  Elevated Lipids Father  Heart Failure Father  Heart Disease Brother  Cancer Brother   
     kidney  Diabetes Brother  No Known Problems Brother  No Known Problems Brother Social History Tobacco Use  Smoking status: Former Smoker Last attempt to quit: 3/30/2017 Years since quittin.1  Smokeless tobacco: Never Used Substance Use Topics  Alcohol use: No  
  Alcohol/week: 0.0 oz  
  Comment: very very rarely Past Medical History:  
Diagnosis Date  Asthma  Atrial fibrillation ([address] [facility] 2018  Autoimmune disease ([address] [facility]   
 fibromyalgia  CAD (coronary artery disease) 10/9/2015  Closed wedge compression fracture of T7 vertebra ([address] [facility] 2018  Diabetes ([address] [facility]  DVT (deep vein thrombosis) in pregnancy ([address]  GERD (gastroesophageal reflux disease)  HTN (hypertension)  NSTEMI (non-ST elevated myocardial infarction) ([address] 10/9/2015  Obesity (BMI 30-39.9) 2018  Sleep apnea   
 wears CPAP Past Surgical History:  
Procedure Laterality Date  ABDOMEN SURGERY PROC UNLISTED    
 hital hernia repair, gall bladder removed  BREAST SURGERY PROCEDURE UNLISTED    
 lumpectomy  CARDIAC SURG PROCEDURE UNLIST  10/9/15  
 2 stents [facility] COLOSTOMY    
 and reversal, post episiotomy repair [id number] UROLOGICAL  2009  
 bladder sling Prior to Admission medications Medication Sig Start Date End Date Taking? Authorizing Provider Omeprazole delayed release (PRILOSEC D/R) 20 mg tablet Take 20 mg by mouth two (2) times a day. Yes Provider, Historical  
apixaban (ELIQUIS) 5 mg tablet Take 5 mg by mouth two (2) times a day. Yes Provider, Historical  
ibandronate (BONIVA) 150 mg tablet Take 150 mg by mouth every thirty (30) days. Last dose 19   Yes Provider, Historical  
lisinopril (PRINIVIL, ZESTRIL) 5 mg tablet Take 5 mg by mouth daily. Yes Provider, Historical  
promethazine (PHENERGAN) 25 mg tablet Take 25 mg by mouth every eight (8) hours as needed for Nausea (Nausea not relieved by ondansetron).    Yes Provider, Historical  
metoprolol tartrate (LOPRESSOR) 25 mg tablet Take 25 mg by mouth two (2) times a day. Hold if SBP <120 mmHg. Patient states she takes this rarely, usually once weekly due to chronically low SBP. Yes Provider, Historical  
loperamide (IMODIUM) 2 mg capsule Take 2 mg by mouth daily as needed for Diarrhea. Yes Provider, Historical  
mirtazapine (REMERON) 15 mg tablet Take 15 mg by mouth nightly. Yes Provider, Historical  
predniSONE (DELTASONE) 10 mg tablet Take 10 mg by mouth two (2) times a day. Yes Provider, Historical  
potassium chloride SR (KLOR-CON 10) 10 mEq tablet Take 10 mEq by mouth two (2) times daily (with meals). Yes Provider, Historical  
magnesium oxide (MAG-OX) 400 mg tablet Take 1 Tab by mouth daily. 12/4/18  Yes Jesus Abbott MD  
ondansetron (ZOFRAN ODT) 4 mg disintegrating tablet Take 1 Tab by mouth every eight (8) hours as needed. 11/30/18  Yes Kendal Sanchez MD  
tiotropium (SPIRIVA WITH HANDIHALER) 18 mcg inhalation capsule Take 1 Cap by inhalation daily. Yes Provider, Historical  
albuterol (PROVENTIL VENTOLIN) 2.5 mg /3 mL (0.083 %) nebulizer solution 2.5 mg by Nebulization route every six (6) hours as needed for Wheezing or Shortness of Breath. Yes Provider, Historical  
montelukast (SINGULAIR) 10 mg tablet Take 10 mg by mouth nightly. Yes Provider, Historical  
albuterol (PROAIR HFA) 90 mcg/actuation inhaler Take 1 Puff by inhalation every four (4) hours as needed. Yes Provider, Historical  
clopidogrel (PLAVIX) 75 mg tab Take 75 mg by mouth daily. Yes Provider, Historical  
lactobacillus rhamnosus gg 10 billion cell (CULTURELLE) 10 billion cell capsule Take 1 Cap by mouth daily. Yes Provider, Historical  
acetaminophen (TYLENOL) 325 mg tablet Take 650 mg by mouth every six (6) hours as needed for Pain (Mild pain). Yes Provider, Historical  
biotin 10,000 mcg cap Take 1 Cap by mouth daily.    Yes Provider, Historical  
 saxagliptin (ONGLYZA) 5 mg tab tablet Take 5 mg by mouth daily. Yes Provider, Historical  
DULoxetine (CYMBALTA) 60 mg capsule Take 60 mg by mouth daily. Yes Provider, Historical  
cholecalciferol (VITAMIN D3) 1,000 unit tablet Take 1,000 Units by mouth daily. Yes Provider, Historical  
azelastine-fluticasone (DYMISTA) 137-50 mcg/spray spry 1 [place] by Nasal route daily. Yes Provider, Historical  
mometasone-formoterol (DULERA) 200-5 mcg/actuation HFA inhaler Take 2 Puffs by inhalation two (2) times a day. Yes Provider, Historical  
 
Current Facility-Administered Medications Medication Dose Route Frequency  sodium chloride (NS) flush 5-40 mL  5-40 mL IntraVENous Q8H  
 sodium chloride (NS) flush 5-40 mL  5-40 mL IntraVENous PRN  
 acetaminophen (TYLENOL) tablet 650 mg  650 mg Oral Q4H PRN  
 naloxone (NARCAN) injection 0.4 mg  0.4 mg IntraVENous PRN  
 ondansetron (ZOFRAN) injection 4 mg  4 mg IntraVENous Q4H PRN  
 [START ON 5/18/2019] cefepime (MAXIPIME) 2 g in 0.9% sodium chloride (MBP/ADV) 100 mL  2 g IntraVENous Q8H  potassium chloride SR (KLOR-CON 10) tablet 40 mEq  40 mEq Oral Q4H  
 sodium chloride (NS) flush 5-10 mL  5-10 mL IntraVENous PRN  
 insulin lispro (HUMALOG) injection   SubCUTAneous AC&HS  
 glucose chewable tablet 16 g  4 Tab Oral PRN  
 dextrose (D50) infusion 12.5-25 g  12.5-25 g IntraVENous PRN  
 glucagon (GLUCAGEN) injection 1 mg  1 mg IntraMUSCular PRN  
 [START ON 5/18/2019] vancomycin (VANCOCIN) 1,750 mg in 0.9% sodium chloride 500 mL IVPB  1,750 mg IntraVENous Q12H  
 apixaban (ELIQUIS) tablet 5 mg  5 mg Oral BID  [START ON 5/18/2019] cholecalciferol (VITAMIN D3) tablet 1,000 Units  1,000 Units Oral DAILY  [START ON 5/18/2019] clopidogrel (PLAVIX) tablet 75 mg  75 mg Oral DAILY  [START ON 5/18/2019] DULoxetine (CYMBALTA) capsule 60 mg  60 mg Oral DAILY  [START ON 5/18/2019] lactobac ac& pc-s.therm-b.anim (NUBIA Q/RISAQUAD)  1 Cap Oral DAILY  mirtazapine (REMERON) tablet 15 mg  15 mg Oral QHS  montelukast (SINGULAIR) tablet 10 mg  10 mg Oral QHS  [START ON 5/18/2019] pantoprazole (PROTONIX) tablet 20 mg (Patient Supplied)  20 mg Oral ACD  [START ON 5/18/2019] sAXagliptin (ONGLYZA) tablet 5 mg (Patient Supplied)  5 mg Oral DAILY  traMADol (ULTRAM) tablet 50 mg  50 mg Oral Q8H PRN  
 sodium chloride 0.9 % bolus infusion 1,000 mL  1,000 mL IntraVENous ONCE Followed by  
 sodium chloride 0.9 % bolus infusion 402 mL  402 mL IntraVENous ONCE Current Outpatient Medications Medication Sig  Omeprazole delayed release (PRILOSEC D/R) 20 mg tablet Take 20 mg by mouth two (2) times a day.  apixaban (ELIQUIS) 5 mg tablet Take 5 mg by mouth two (2) times a day.  ibandronate (BONIVA) 150 mg tablet Take 150 mg by mouth every thirty (30) days. Last dose 4/1/19  lisinopril (PRINIVIL, ZESTRIL) 5 mg tablet Take 5 mg by mouth daily.  promethazine (PHENERGAN) 25 mg tablet Take 25 mg by mouth every eight (8) hours as needed for Nausea (Nausea not relieved by ondansetron).  metoprolol tartrate (LOPRESSOR) 25 mg tablet Take 25 mg by mouth two (2) times a day. Hold if SBP <120 mmHg. Patient states she takes this rarely, usually once weekly due to chronically low SBP.  loperamide (IMODIUM) 2 mg capsule Take 2 mg by mouth daily as needed for Diarrhea.  mirtazapine (REMERON) 15 mg tablet Take 15 mg by mouth nightly.  predniSONE (DELTASONE) 10 mg tablet Take 10 mg by mouth two (2) times a day.  potassium chloride SR (KLOR-CON 10) 10 mEq tablet Take 10 mEq by mouth two (2) times daily (with meals).  magnesium oxide (MAG-OX) 400 mg tablet Take 1 Tab by mouth daily.  ondansetron (ZOFRAN ODT) 4 mg disintegrating tablet Take 1 Tab by mouth every eight (8) hours as needed.  tiotropium (SPIRIVA WITH HANDIHALER) 18 mcg inhalation capsule Take 1 Cap by inhalation daily.  albuterol (PROVENTIL VENTOLIN) 2.5 mg /3 mL (0.083 %) nebulizer solution 2.5 mg by Nebulization route every six (6) hours as needed for Wheezing or Shortness of Breath.  montelukast (SINGULAIR) 10 mg tablet Take 10 mg by mouth nightly.  albuterol (PROAIR HFA) 90 mcg/actuation inhaler Take 1 Puff by inhalation every four (4) hours as needed.  clopidogrel (PLAVIX) 75 mg tab Take 75 mg by mouth daily.  lactobacillus rhamnosus gg 10 billion cell (CULTURELLE) 10 billion cell capsule Take 1 Cap by mouth daily.  acetaminophen (TYLENOL) 325 mg tablet Take 650 mg by mouth every six (6) hours as needed for Pain (Mild pain).  biotin 10,000 mcg cap Take 1 Cap by mouth daily.  saxagliptin (ONGLYZA) 5 mg tab tablet Take 5 mg by mouth daily.  DULoxetine (CYMBALTA) 60 mg capsule Take 60 mg by mouth daily.  cholecalciferol (VITAMIN D3) 1,000 unit tablet Take 1,000 Units by mouth daily.  azelastine-fluticasone (DYMISTA) 137-50 mcg/spray spry 1 [place] by Nasal route daily.  mometasone-formoterol (DULERA) 200-5 mcg/actuation HFA inhaler Take 2 Puffs by inhalation two (2) times a day. Allergies Allergen Reactions  Advair Diskus [Fluticasone Propion-Salmeterol] Rash  Aspartame Other (comments)  Breo Ellipta [Fluticasone Furoate-Vilanterol] Other (comments)  Ciprofloxacin Rash  Statins-Hmg-Coa Reductase Inhibitors Other (comments) Muscle pain Lipitor/crestor/zocor  Sulfa (Sulfonamide Antibiotics) Rash  Tetracycline Other (comments) musclepain  Zetia [Ezetimibe] Myalgia Review of Systems:  A comprehensive review of systems was negative except for that written in the HPI. Objective:  
 
Visit Vitals /54 Pulse (!) 110 Temp 98.2 °F (36.8 °C) Resp 16 Wt 113.4 kg (250 lb) SpO2 90% BMI 39.16 kg/m² EXAM: I examined pt's R hand, gangrenous tips of the index, long and ring finger, no erythema or edema noted.   
 
XR Results (most recent): 
 Results from [facility] Encounter encounter on 04/26/19 XR CHEST PORT Narrative INDICATION: admit EXAM:  AP CHEST RADIOGRAPH 
 
COMPARISON: January 5, 2019 FINDINGS: 
 
AP portable view of the chest demonstrates a normal cardiomediastinal 
silhouette. The lungs are adequately expanded. There is no edema, effusion, 
consolidation, or pneumothorax. The osseous structures are unremarkable. Impression IMPRESSION: 
No acute process. Assessment/Plan:  
 
Encounter Diagnoses ICD-10-CM ICD-9-CM 1. Cellulitis of right upper extremity L03.113 682.3 In presence of cellulitis of the upper arm, no elective amputation of the dry gangrenous fingers likely on Monday. Dr. Joey Hodges will speak with Dr. Aishwarya Jiménez regarding rescheduling the surgery due to risk of surgical infection. Dr. Joey Hodges agrees with plan. Margarita Calle PA-C Orthopaedic Surgery [place]

## 2019-05-18 NOTE — ED NOTES
TRANSFER - OUT REPORT: 
 
Verbal report given to MADAI Lott(name) on Mandy Quiles  being transferred to 4th Floor(unit) for routine progression of care Report consisted of patients Situation, Background, Assessment and  
Recommendations(SBAR). Information from the following report(s) SBAR, Kardex, ED Summary, MAR, Accordion and Recent Results was reviewed with the receiving nurse. Lines:  
Peripheral IV 05/17/19 Left Antecubital (Active) Site Assessment Clean, dry, & intact 5/17/2019  6:40 PM  
Phlebitis Assessment 0 5/17/2019  6:40 PM  
Infiltration Assessment 0 5/17/2019  6:40 PM  
Dressing Status Clean, dry, & intact 5/17/2019  6:40 PM  
Dressing Type Tape;Transparent 5/17/2019  6:40 PM  
Hub Color/Line Status Pink;Flushed;Patent 5/17/2019  6:40 PM  
Action Taken Blood drawn 5/17/2019  6:40 PM  
   
Peripheral IV 05/17/19 Left Forearm (Active) Site Assessment Clean, dry, & intact 5/17/2019  6:40 PM  
Phlebitis Assessment 0 5/17/2019  6:40 PM  
Infiltration Assessment 0 5/17/2019  6:40 PM  
Dressing Status Clean, dry, & intact 5/17/2019  6:40 PM  
Dressing Type Tape;Transparent 5/17/2019  6:40 PM  
Hub Color/Line Status Pink;Flushed;Patent 5/17/2019  6:40 PM  
Action Taken Blood drawn 5/17/2019  6:40 PM  
  
 
Opportunity for questions and clarification was provided. Patient transported with: 
 Monitor Registered Nurse

## 2019-05-18 NOTE — PROGRESS NOTES
Daily Progress Note: 5/18/2019 Carolyne Ríos MD 
 
Assessment/Plan:  
Cellulitis of right upper arm (5/17/2019): infected and purulent recent surgical wounds. --Started IV cefepime and Vancomycin. --Pain control. Wound care consult --Blood culture no growth to date 
--Wound culture so far with gram + cocci 
  
Severe sepsis due to cellulitis POA: she has SIRS criteria with an elevated lactate in the setting of cellulitis. --lactic acid normal 
  
Type II diabetes mellitus ([facility] (10/9/2015) / Type 2 diabetes with nephropathy ([facility] (10/1/2018): 
--A1c at 7.7% 
  
Hypokalemia:resolved 
  
Coronary artery disease involving native coronary artery without angina pectoris (1/22/2016):  
--?this is why she is on plavix 
  
Essential hypertension (1/22/2016): BP stable. --hold bblocker 
  
Recurrent deep vein thrombosis (DVT) ([facility] (3/30/2018) / Chronic anticoagulation (3/30/2018):  
--resume Eliquis 
  
Gangrene of finger of right hand ([facility] (5/17/2019): involving right index, mid and ring fingers. --ortho has seen, possible OR Monday 
  
Obesity (BMI 30-39.9) (11/13/2018): nutritional consult 
  
Atrial fibrillation ([facility] (11/16/2018): holding bblocker due to low BP. Continuing Eliquis  
  
Mild intermittent asthma (5/17/2019): not wheezing. Nebs as needed DVT proph - on eliquis Problem List: 
Problem List as of 5/18/2019 Date Reviewed: 4/26/2019 Codes Class Noted - Resolved * (Principal) Cellulitis of right upper arm ICD-10-CM: L03.113 ICD-9-CM: 682.3  5/17/2019 - Present Mild intermittent asthma ICD-10-CM: J45.20 ICD-9-CM: 493.90  5/17/2019 - Present Gangrene of finger of right hand [facility]) ICD-10-CM: R53 
ICD-9-CM: 785.4  5/17/2019 - Present Brachial artery thrombus (HCC) ICD-10-CM: T73.8 ICD-9-CM: 444.21  4/26/2019 - Present Bowel obstruction ([address] ICD-10-CM: U13.931 ICD-9-CM: 560.9  1/8/2019 - Present Partial small bowel obstruction ([facility] ICD-10-CM: K56.600 ICD-9-CM: 560.9  1/5/2019 - Present Sleep apnea ICD-10-CM: G47.30 ICD-9-CM: 780.57  1/5/2019 - Present Overview Signed 1/5/2019  8:41 PM by Hermann Lake, DO  
  wears CPAP Atrial fibrillation [facility]) ICD-10-CM: I48.91 
ICD-9-CM: 427.31  11/16/2018 - Present Dyspnea ICD-10-CM: R06.00 
ICD-9-CM: 786.09  11/13/2018 - Present ARF (acute renal failure) (HCC) ICD-10-CM: N17.9 ICD-9-CM: 584.9  11/13/2018 - Present Obesity (BMI 30-39.9) (Chronic) ICD-10-CM: F98.9 ICD-9-CM: 278.00  11/13/2018 - Present Closed wedge compression fracture of T7 vertebra ([facility] ICD-10-CM: M72.390N ICD-9-CM: 805.2  11/13/2018 - Present Type 2 diabetes with nephropathy ([facility] ICD-10-CM: E11.21 
ICD-9-CM: 250.40, 583.81  10/1/2018 - Present Chest pain ICD-10-CM: R07.9 ICD-9-CM: 786.50  6/27/2018 - Present Generalized abdominal pain ICD-10-CM: R10.84 ICD-9-CM: 789.07  6/27/2018 - Present Recurrent deep vein thrombosis (DVT) (HCC) ICD-10-CM: I82.409 ICD-9-CM: 453.40  3/30/2018 - Present Chronic anticoagulation (Chronic) ICD-10-CM: Z79.01 
ICD-9-CM: V58.61  3/30/2018 - Present Coronary artery disease involving native coronary artery without angina pectoris (Chronic) ICD-10-CM: I25.10 ICD-9-CM: 414.01  1/22/2016 - Present Essential hypertension (Chronic) ICD-10-CM: I10 
ICD-9-CM: 401.9  1/22/2016 - Present Type II diabetes mellitus (HCC) (Chronic) ICD-10-CM: E11.9 ICD-9-CM: 250.00  10/9/2015 - Present NSTEMI (non-ST elevated myocardial infarction) ([facility] ICD-10-CM: I21.4 ICD-9-CM: 410.70  10/9/2015 - Present RESOLVED: CAD (coronary artery disease) ICD-10-CM: I25.10 ICD-9-CM: 414.00  10/9/2015 - 1/22/2016 RESOLVED: HTN (hypertension) ICD-10-CM: I10 
ICD-9-CM: 401.9  10/9/2015 - 1/22/2016 Subjective: H&P: Ms. Matthias Al is a 72 y.o. female who is being admitted for cellulitis of right upper arm. Ms. Matthias Al presented to our Emergency Department today complaining of a progressive swelling, redness and discharge from surgery wounds following a thrombectomy of right arm including brachial, radial and ulnar arteries with wrist exploration done recently. She denies any fever or chills. Pain seems worse with movement but stable when arm is rested. She also has gangrene three finger right hand for which she was to have amputations next week. She will be admitted for further management. : No acute events overnight. Slept with CPAP. She has pain but it is currently controlled. Denies CP, SOB. Review of Systems: A comprehensive review of systems was negative except for that written in the HPI. Objective:  
Physical Exam:  
 
Visit Vitals /69 (BP 1 Location: Left arm, BP Patient Position: At rest) Pulse (!) 110 Temp 97.5 °F (36.4 °C) Resp 18 Wt 113.4 kg (250 lb) SpO2 94% BMI 39.16 kg/m² O2 Device: CPAP mask Temp (24hrs), Av.7 °F (36.5 °C), Min:97.5 °F (36.4 °C), Max:98.2 °F (36.8 °C) No intake/output data recorded.  1901 -  0700 In: -  
Out: 200 [Urine:200] General:  Alert, cooperative, no distress, appears stated age, obese Head:  Normocephalic, without obvious abnormality, atraumatic. Eyes:  Conjunctivae/corneas clear. PERRL, EOMs intact. Nose: Nares normal. Septum midline. Mucosa normal. No drainage or sinus tenderness. Throat: Lips, mucosa, and tongue moist.. Neck: Supple, symmetrical, trachea midline, no adenopathy. Back:   Symmetric, no curvature. ROM normal. No CVA tenderness. Lungs:   Clear to auscultation bilaterally. Heart:  Irregularly irregular, S1, S2 normal, no murmur, click, rub or gallop. Abdomen:   Soft, non-tender. Bowel sounds normal. No masses,  No organomegaly. Extremities: LE trace edema. Gangrenous fingertips right hand Pulses: 2+ and symmetric all extremities. Skin: Right upper arm incision with surrounding warmth, mild edema and central drainage of exudate, no fluctuance Neurologic: CNII-XII intact. Alert and oriented X 3. Data Review:  
   
Recent Days: 
Recent Labs 05/18/19 
0415 05/17/19 
1822 WBC 12.9* 20.8* HGB 8.2* 10.3* HCT 28.8* 35.0  
 393 Recent Labs 05/18/19 0415 05/17/19 
1822  137  
K 4.2 3.1*  
* 106 CO2 22 24 * 147* BUN 23* 26* CREA 0.92 1.17* CA 7.5* 8.6 ALB 2.3* 2.9*  
SGOT 12* 19 ALT 28 37 No results for input(s): PH, PCO2, PO2, HCO3, FIO2 in the last 72 hours. 24 Hour Results: 
Recent Results (from the past 24 hour(s)) CBC WITH AUTOMATED DIFF Collection Time: 05/17/19  6:22 PM  
Result Value Ref Range WBC 20.8 (H) 3.6 - 11.0 K/uL  
 RBC 4.13 3.80 - 5.20 M/uL  
 HGB 10.3 (L) 11.5 - 16.0 g/dL HCT 35.0 35.0 - 47.0 % MCV 84.7 80.0 - 99.0 FL  
 MCH 24.9 (L) 26.0 - 34.0 PG  
 MCHC 29.4 (L) 30.0 - 36.5 g/dL  
 RDW 15.6 (H) 11.5 - 14.5 % PLATELET 105 545 - 720 K/uL MPV 10.2 8.9 - 12.9 FL  
 NRBC 0.0 0  WBC ABSOLUTE NRBC 0.00 0.00 - 0.01 K/uL NEUTROPHILS 77 (H) 32 - 75 % LYMPHOCYTES 14 12 - 49 % MONOCYTES 7 5 - 13 % EOSINOPHILS 1 0 - 7 % BASOPHILS 0 0 - 1 % IMMATURE GRANULOCYTES 1 (H) 0.0 - 0.5 % ABS. NEUTROPHILS 16.0 (H) 1.8 - 8.0 K/UL  
 ABS. LYMPHOCYTES 2.9 0.8 - 3.5 K/UL  
 ABS. MONOCYTES 1.5 (H) 0.0 - 1.0 K/UL  
 ABS. EOSINOPHILS 0.2 0.0 - 0.4 K/UL  
 ABS. BASOPHILS 0.1 0.0 - 0.1 K/UL  
 ABS. IMM. GRANS. 0.2 (H) 0.00 - 0.04 K/UL  
 DF AUTOMATED METABOLIC PANEL, COMPREHENSIVE Collection Time: 05/17/19  6:22 PM  
Result Value Ref Range Sodium 137 136 - 145 mmol/L Potassium 3.1 (L) 3.5 - 5.1 mmol/L Chloride 106 97 - 108 mmol/L  
 CO2 24 21 - 32 mmol/L  Anion gap 7 5 - 15 mmol/L  
 Glucose 147 (H) 65 - 100 mg/dL BUN 26 (H) 6 - 20 MG/DL Creatinine 1.17 (H) 0.55 - 1.02 MG/DL  
 BUN/Creatinine ratio 22 (H) 12 - 20 GFR est AA 55 (L) >60 ml/min/1.73m2 GFR est non-AA 46 (L) >60 ml/min/1.73m2 Calcium 8.6 8.5 - 10.1 MG/DL Bilirubin, total 0.2 0.2 - 1.0 MG/DL  
 ALT (SGPT) 37 12 - 78 U/L  
 AST (SGOT) 19 15 - 37 U/L Alk. phosphatase 248 (H) 45 - 117 U/L Protein, total 7.1 6.4 - 8.2 g/dL Albumin 2.9 (L) 3.5 - 5.0 g/dL Globulin 4.2 (H) 2.0 - 4.0 g/dL A-G Ratio 0.7 (L) 1.1 - 2.2 CULTURE, BLOOD Collection Time: 05/17/19  6:22 PM  
Result Value Ref Range Special Requests: NO SPECIAL REQUESTS Culture result: NO GROWTH AFTER 12 HOURS    
HEMOGLOBIN A1C WITH EAG Collection Time: 05/17/19  6:22 PM  
Result Value Ref Range Hemoglobin A1c 7.7 (H) 4.2 - 6.3 % Est. average glucose 174 mg/dL POC LACTIC ACID Collection Time: 05/17/19  6:23 PM  
Result Value Ref Range Lactic Acid (POC) 2.37 (HH) 0.40 - 2.00 mmol/L  
CULTURE, WOUND W GRAM STAIN Collection Time: 05/17/19  6:34 PM  
Result Value Ref Range Special Requests: NO SPECIAL REQUESTS    
 GRAM STAIN RARE WBCS SEEN    
 GRAM STAIN FEW GRAM POSITIVE COCCI IN PAIRS Culture result: PENDING   
CULTURE, BLOOD Collection Time: 05/17/19  6:34 PM  
Result Value Ref Range Special Requests: NO SPECIAL REQUESTS Culture result: NO GROWTH AFTER 12 HOURS    
GLUCOSE, POC Collection Time: 05/17/19 10:51 PM  
Result Value Ref Range Glucose (POC) 147 (H) 65 - 100 mg/dL Performed by [facility] METABOLIC PANEL, COMPREHENSIVE Collection Time: 05/18/19  4:15 AM  
Result Value Ref Range Sodium 142 136 - 145 mmol/L Potassium 4.2 3.5 - 5.1 mmol/L Chloride 115 (H) 97 - 108 mmol/L  
 CO2 22 21 - 32 mmol/L Anion gap 5 5 - 15 mmol/L Glucose 124 (H) 65 - 100 mg/dL BUN 23 (H) 6 - 20 MG/DL  Creatinine 0.92 0.55 - 1.02 MG/DL  
 BUN/Creatinine ratio 25 (H) 12 - 20 GFR est AA >60 >60 ml/min/1.73m2 GFR est non-AA >60 >60 ml/min/1.73m2 Calcium 7.5 (L) 8.5 - 10.1 MG/DL Bilirubin, total 0.2 0.2 - 1.0 MG/DL  
 ALT (SGPT) 28 12 - 78 U/L  
 AST (SGOT) 12 (L) 15 - 37 U/L Alk. phosphatase 181 (H) 45 - 117 U/L Protein, total 5.7 (L) 6.4 - 8.2 g/dL Albumin 2.3 (L) 3.5 - 5.0 g/dL Globulin 3.4 2.0 - 4.0 g/dL A-G Ratio 0.7 (L) 1.1 - 2.2    
CBC WITH AUTOMATED DIFF Collection Time: 05/18/19  4:15 AM  
Result Value Ref Range WBC 12.9 (H) 3.6 - 11.0 K/uL  
 RBC 3.37 (L) 3.80 - 5.20 M/uL HGB 8.2 (L) 11.5 - 16.0 g/dL HCT 28.8 (L) 35.0 - 47.0 % MCV 85.5 80.0 - 99.0 FL  
 MCH 24.3 (L) 26.0 - 34.0 PG  
 MCHC 28.5 (L) 30.0 - 36.5 g/dL  
 RDW 15.9 (H) 11.5 - 14.5 % PLATELET 176 096 - 297 K/uL MPV 10.3 8.9 - 12.9 FL  
 NRBC 0.0 0  WBC ABSOLUTE NRBC 0.00 0.00 - 0.01 K/uL NEUTROPHILS 71 32 - 75 % LYMPHOCYTES 17 12 - 49 % MONOCYTES 9 5 - 13 % EOSINOPHILS 2 0 - 7 % BASOPHILS 0 0 - 1 % IMMATURE GRANULOCYTES 1 (H) 0.0 - 0.5 % ABS. NEUTROPHILS 9.1 (H) 1.8 - 8.0 K/UL  
 ABS. LYMPHOCYTES 2.2 0.8 - 3.5 K/UL  
 ABS. MONOCYTES 1.2 (H) 0.0 - 1.0 K/UL  
 ABS. EOSINOPHILS 0.3 0.0 - 0.4 K/UL  
 ABS. BASOPHILS 0.0 0.0 - 0.1 K/UL  
 ABS. IMM. GRANS. 0.1 (H) 0.00 - 0.04 K/UL  
 DF SMEAR SCANNED    
 RBC COMMENTS HYPOCHROMIA 1+ 
    
 RBC COMMENTS ANISOCYTOSIS 1+ LACTIC ACID Collection Time: 05/18/19  4:15 AM  
Result Value Ref Range Lactic acid 1.2 0.4 - 2.0 MMOL/L Medications reviewed Current Facility-Administered Medications Medication Dose Route Frequency  morphine injection 1 mg  1 mg IntraVENous Q4H PRN  
 HYDROcodone-acetaminophen (NORCO) 5-325 mg per tablet 1 Tab  1 Tab Oral Q6H PRN  
 sodium chloride (NS) flush 5-40 mL  5-40 mL IntraVENous Q8H  
 sodium chloride (NS) flush 5-40 mL  5-40 mL IntraVENous PRN  
  acetaminophen (TYLENOL) tablet 650 mg  650 mg Oral Q4H PRN  
 naloxone (NARCAN) injection 0.4 mg  0.4 mg IntraVENous PRN  
 ondansetron (ZOFRAN) injection 4 mg  4 mg IntraVENous Q4H PRN  
 cefepime (MAXIPIME) 2 g in 0.9% sodium chloride (MBP/ADV) 100 mL  2 g IntraVENous Q8H  
 sodium chloride (NS) flush 5-10 mL  5-10 mL IntraVENous PRN  
 insulin lispro (HUMALOG) injection   SubCUTAneous AC&HS  
 glucose chewable tablet 16 g  4 Tab Oral PRN  
 dextrose (D50W) injection syrg 12.5-25 g  12.5-25 g IntraVENous PRN  
 glucagon (GLUCAGEN) injection 1 mg  1 mg IntraMUSCular PRN  
 vancomycin (VANCOCIN) 1,750 mg in 0.9% sodium chloride 500 mL IVPB  1,750 mg IntraVENous Q12H  
 apixaban (ELIQUIS) tablet 5 mg  5 mg Oral BID  cholecalciferol (VITAMIN D3) tablet 1,000 Units  1,000 Units Oral DAILY  clopidogrel (PLAVIX) tablet 75 mg  75 mg Oral DAILY  DULoxetine (CYMBALTA) capsule 60 mg  60 mg Oral DAILY  lactobac ac& pc-s.therm-b.anim (NUBIA Q/RISAQUAD)  1 Cap Oral DAILY  mirtazapine (REMERON) tablet 15 mg  15 mg Oral QHS  montelukast (SINGULAIR) tablet 10 mg  10 mg Oral QHS  pantoprazole (PROTONIX) tablet 20 mg  (Patient Supplied)  20 mg Oral ACD  sAXagliptin (ONGLYZA) tablet 5 mg  (Patient Supplied)  5 mg Oral DAILY  traMADol (ULTRAM) tablet 50 mg  50 mg Oral Q8H PRN Care Plan discussed with: Patient Total time spent with patient: 30 minutes.  
 
Charito Hair MD

## 2019-05-19 LAB
ANION GAP SERPL CALC-SCNC: 6 MMOL/L (ref 5–15)
BACTERIA SPEC CULT: ABNORMAL
BACTERIA SPEC CULT: ABNORMAL
BUN SERPL-MCNC: 16 MG/DL (ref 6–20)
BUN/CREAT SERPL: 18 (ref 12–20)
CALCIUM SERPL-MCNC: 8.1 MG/DL (ref 8.5–10.1)
CHLORIDE SERPL-SCNC: 114 MMOL/L (ref 97–108)
CO2 SERPL-SCNC: 20 MMOL/L (ref 21–32)
CREAT SERPL-MCNC: 0.91 MG/DL (ref 0.55–1.02)
DATE LAST DOSE: ABNORMAL
ERYTHROCYTE [DISTWIDTH] IN BLOOD BY AUTOMATED COUNT: 16.1 % (ref 11.5–14.5)
GLUCOSE BLD STRIP.AUTO-MCNC: 105 MG/DL (ref 65–100)
GLUCOSE BLD STRIP.AUTO-MCNC: 117 MG/DL (ref 65–100)
GLUCOSE BLD STRIP.AUTO-MCNC: 121 MG/DL (ref 65–100)
GLUCOSE BLD STRIP.AUTO-MCNC: 126 MG/DL (ref 65–100)
GLUCOSE SERPL-MCNC: 112 MG/DL (ref 65–100)
GRAM STN SPEC: ABNORMAL
GRAM STN SPEC: ABNORMAL
HCT VFR BLD AUTO: 32.5 % (ref 35–47)
HGB BLD-MCNC: 9.3 G/DL (ref 11.5–16)
MCH RBC QN AUTO: 24.7 PG (ref 26–34)
MCHC RBC AUTO-ENTMCNC: 28.6 G/DL (ref 30–36.5)
MCV RBC AUTO: 86.2 FL (ref 80–99)
NRBC # BLD: 0 K/UL (ref 0–0.01)
NRBC BLD-RTO: 0 PER 100 WBC
PLATELET # BLD AUTO: 315 K/UL (ref 150–400)
PMV BLD AUTO: 10.4 FL (ref 8.9–12.9)
POTASSIUM SERPL-SCNC: 3.5 MMOL/L (ref 3.5–5.1)
RBC # BLD AUTO: 3.77 M/UL (ref 3.8–5.2)
REPORTED DOSE,DOSE: ABNORMAL UNITS
REPORTED DOSE/TIME,TMG: 2000
SERVICE CMNT-IMP: ABNORMAL
SODIUM SERPL-SCNC: 140 MMOL/L (ref 136–145)
VANCOMYCIN TROUGH SERPL-MCNC: 37.6 UG/ML (ref 5–10)
WBC # BLD AUTO: 12 K/UL (ref 3.6–11)

## 2019-05-19 PROCEDURE — 94760 N-INVAS EAR/PLS OXIMETRY 1: CPT

## 2019-05-19 PROCEDURE — 65270000029 HC RM PRIVATE

## 2019-05-19 PROCEDURE — 80048 BASIC METABOLIC PNL TOTAL CA: CPT

## 2019-05-19 PROCEDURE — 74011250637 HC RX REV CODE- 250/637: Performed by: FAMILY MEDICINE

## 2019-05-19 PROCEDURE — 82962 GLUCOSE BLOOD TEST: CPT

## 2019-05-19 PROCEDURE — 74011250636 HC RX REV CODE- 250/636: Performed by: INTERNAL MEDICINE

## 2019-05-19 PROCEDURE — 80202 ASSAY OF VANCOMYCIN: CPT

## 2019-05-19 PROCEDURE — 36415 COLL VENOUS BLD VENIPUNCTURE: CPT

## 2019-05-19 PROCEDURE — 74011000258 HC RX REV CODE- 258: Performed by: INTERNAL MEDICINE

## 2019-05-19 PROCEDURE — 74011250637 HC RX REV CODE- 250/637: Performed by: INTERNAL MEDICINE

## 2019-05-19 PROCEDURE — 85027 COMPLETE CBC AUTOMATED: CPT

## 2019-05-19 RX ORDER — METOPROLOL TARTRATE 25 MG/1
12.5 TABLET, FILM COATED ORAL EVERY 12 HOURS
Status: DISCONTINUED | OUTPATIENT
Start: 2019-05-19 | End: 2019-05-24 | Stop reason: HOSPADM

## 2019-05-19 RX ADMIN — TRAMADOL HYDROCHLORIDE 50 MG: 50 TABLET, FILM COATED ORAL at 23:16

## 2019-05-19 RX ADMIN — CLOPIDOGREL 75 MG: 75 TABLET, FILM COATED ORAL at 08:11

## 2019-05-19 RX ADMIN — MONTELUKAST SODIUM 10 MG: 10 TABLET, FILM COATED ORAL at 23:16

## 2019-05-19 RX ADMIN — APIXABAN 5 MG: 5 TABLET, FILM COATED ORAL at 20:35

## 2019-05-19 RX ADMIN — Medication 1 CAPSULE: at 08:11

## 2019-05-19 RX ADMIN — DULOXETINE HYDROCHLORIDE 60 MG: 30 CAPSULE, DELAYED RELEASE ORAL at 08:11

## 2019-05-19 RX ADMIN — Medication 10 ML: at 05:03

## 2019-05-19 RX ADMIN — TRAMADOL HYDROCHLORIDE 50 MG: 50 TABLET, FILM COATED ORAL at 10:05

## 2019-05-19 RX ADMIN — CEFEPIME 2 G: 2 INJECTION, POWDER, FOR SOLUTION INTRAVENOUS at 20:35

## 2019-05-19 RX ADMIN — Medication 10 ML: at 12:23

## 2019-05-19 RX ADMIN — CEFEPIME 2 G: 2 INJECTION, POWDER, FOR SOLUTION INTRAVENOUS at 12:22

## 2019-05-19 RX ADMIN — Medication 10 ML: at 23:17

## 2019-05-19 RX ADMIN — HYDROCODONE BITARTRATE AND ACETAMINOPHEN 1 TABLET: 5; 325 TABLET ORAL at 05:11

## 2019-05-19 RX ADMIN — VITAMIN D, TAB 1000IU (100/BT) 1000 UNITS: 25 TAB at 08:11

## 2019-05-19 RX ADMIN — HYDROCODONE BITARTRATE AND ACETAMINOPHEN 1 TABLET: 5; 325 TABLET ORAL at 12:22

## 2019-05-19 RX ADMIN — MIRTAZAPINE 15 MG: 15 TABLET, FILM COATED ORAL at 23:16

## 2019-05-19 RX ADMIN — APIXABAN 5 MG: 5 TABLET, FILM COATED ORAL at 08:11

## 2019-05-19 RX ADMIN — CEFEPIME 2 G: 2 INJECTION, POWDER, FOR SOLUTION INTRAVENOUS at 05:03

## 2019-05-19 RX ADMIN — METOPROLOL TARTRATE 12.5 MG: 25 TABLET ORAL at 10:01

## 2019-05-19 RX ADMIN — METOPROLOL TARTRATE 12.5 MG: 25 TABLET ORAL at 20:35

## 2019-05-19 RX ADMIN — HYDROCODONE BITARTRATE AND ACETAMINOPHEN 1 TABLET: 5; 325 TABLET ORAL at 18:18

## 2019-05-19 RX ADMIN — VANCOMYCIN HYDROCHLORIDE 1750 MG: 10 INJECTION, POWDER, LYOPHILIZED, FOR SOLUTION INTRAVENOUS at 08:11

## 2019-05-19 NOTE — PROGRESS NOTES
Physical Therapy: 
 
Consult received, chart reviewed and patient cleared for therapy session by RN. Received supine in bed with eyes closed. Patient responded to all questions appropriately but did not open her eyes the entire time. Patient declining to participate with therapy at this time 2/2 only recently obtaining pain relief and not wanting to move/use her R hand. Patient also reporting she wants to hold on any therapy at this time until after her amputation surgery tomorrow. RN also encouraging participation but patient continued to decline. Will f/u tomorrow as appropriate and able.    
 
Tamara Dempsey, MS, PT

## 2019-05-19 NOTE — PROGRESS NOTES
Occupational Therapy Patient refused OT evaluation today stating she did not want to move her R hand. She also requested to hold therapy until after amputation surgery tomorrow. Will follow up tomorrow as able. Thank you, MELQUIADES Humphrey/ALEJANDRO

## 2019-05-19 NOTE — PROGRESS NOTES
[address] [address] Pharmacy Dosing Services: Antimicrobial Stewardship Daily Doc Consult for antibiotic dosing of Vancomycin by Dr. Barak Blackman (attending Dr Ani Rm) Indication:sepsis in the setting cellulitis post surgery Day of Therapy 3 Vancomycin therapy: 
Current maintenance dose: 1750 (mg) every 12 hours Goal trough 15-20 mcg/mL. Last trough level 37.6 mcg/ml drawn timely. Plan:Kinetics suggest Q24 hr dosing, AM dose given - hold for now, level tomorrow AM to confirm clearance Pharmacy to follow daily. [facility]                    [id number]

## 2019-05-19 NOTE — PROGRESS NOTES
Daily Progress Note: 5/19/2019 Michael Baugh MD 
 
Assessment/Plan:  
Cellulitis of right upper arm (5/17/2019): infected and purulent recent surgical wounds. --Started IV cefepime and Vancomycin. --Pain control. Wound care consult --Blood culture no growth to date 
--Wound culture +MRSA 
  
Severe sepsis due to cellulitis POA: she has SIRS criteria with an elevated lactate in the setting of cellulitis. --lactic acid normal 
  
Type II diabetes mellitus ([facility] [address] (10/9/2015) / Type 2 diabetes with nephropathy ([facility] [address] (10/1/2018): 
--A1c at 7.7% BS well controlled 
  
Hypokalemia:resolved 
  
Coronary artery disease involving native coronary artery without angina pectoris (1/22/2016):  
--?this is why she is on plavix 
  
Essential hypertension (1/22/2016): BP stable. 
  
Recurrent deep vein thrombosis (DVT) ([facility] [address] (3/30/2018) / Chronic anticoagulation (3/30/2018):  
--resume Eliquis 
  
Gangrene of finger of right hand ([facility] [address] (5/17/2019): involving right index, mid and ring fingers. --ortho has seen, no acute need for surgery at this time 
  
Obesity (BMI 30-39.9) (11/13/2018): nutritional consult 
  
Atrial fibrillation ([facility] [address] (11/16/2018):  
--HR up without bblocker, will re-start metoprolol at low dose - watch for hypotension 
  
Mild intermittent asthma (5/17/2019): not wheezing. Nebs as needed DVT proph - on eliquis Problem List: 
Problem List as of 5/19/2019 Date Reviewed: 4/26/2019 Codes Class Noted - Resolved * (Principal) Cellulitis of right upper arm ICD-10-CM: L03.113 ICD-9-CM: 682.3  5/17/2019 - Present Mild intermittent asthma ICD-10-CM: J45.20 ICD-9-CM: 493.90  5/17/2019 - Present Gangrene of finger of right hand [facility]) ICD-10-CM: A89 
ICD-9-CM: 785.4  5/17/2019 - Present Brachial artery thrombus (HCC) ICD-10-CM: X37.6 ICD-9-CM: 444.21  4/26/2019 - Present Bowel obstruction ([facility] [address] ICD-10-CM: Z18.033 ICD-9-CM: 560.9  1/8/2019 - Present Partial small bowel obstruction ([facility] ICD-10-CM: K56.600 ICD-9-CM: 560.9  1/5/2019 - Present Sleep apnea ICD-10-CM: G47.30 ICD-9-CM: 780.57  1/5/2019 - Present Overview Signed 1/5/2019  8:41 PM by Amadou Brian, DO  
  wears CPAP Atrial fibrillation [facility]) ICD-10-CM: I48.91 
ICD-9-CM: 427.31  11/16/2018 - Present Dyspnea ICD-10-CM: R06.00 
ICD-9-CM: 786.09  11/13/2018 - Present ARF (acute renal failure) (HCC) ICD-10-CM: N17.9 ICD-9-CM: 584.9  11/13/2018 - Present Obesity (BMI 30-39.9) (Chronic) ICD-10-CM: U01.4 ICD-9-CM: 278.00  11/13/2018 - Present Closed wedge compression fracture of T7 vertebra ([facility] ICD-10-CM: W92.126M ICD-9-CM: 805.2  11/13/2018 - Present Type 2 diabetes with nephropathy ([facility] ICD-10-CM: E11.21 
ICD-9-CM: 250.40, 583.81  10/1/2018 - Present Chest pain ICD-10-CM: R07.9 ICD-9-CM: 786.50  6/27/2018 - Present Generalized abdominal pain ICD-10-CM: R10.84 ICD-9-CM: 789.07  6/27/2018 - Present Recurrent deep vein thrombosis (DVT) (HCC) ICD-10-CM: I82.409 ICD-9-CM: 453.40  3/30/2018 - Present Chronic anticoagulation (Chronic) ICD-10-CM: Z79.01 
ICD-9-CM: V58.61  3/30/2018 - Present Coronary artery disease involving native coronary artery without angina pectoris (Chronic) ICD-10-CM: I25.10 ICD-9-CM: 414.01  1/22/2016 - Present Essential hypertension (Chronic) ICD-10-CM: I10 
ICD-9-CM: 401.9  1/22/2016 - Present Type II diabetes mellitus (HCC) (Chronic) ICD-10-CM: E11.9 ICD-9-CM: 250.00  10/9/2015 - Present [facility]STEMI (non-ST elevated [facility] ICD-10-CM: I21.4 ICD-9-CM: 410.70  10/9/2015 - Present RESOLVED: CAD (coronary artery disease) ICD-10-CM: I25.10 ICD-9-CM: 414.00  10/9/2015 - 1/22/2016 RESOLVED: HTN (hypertension) ICD-10-CM: I10 
ICD-9-CM: 401.9  10/9/2015 - 1/22/2016 Subjective: H&P: Ms. Nataliia Lay is a 72 y.o. female who is being admitted for cellulitis of right upper arm. Ms. Nataliia Lay presented to our Emergency Department today complaining of a progressive swelling, redness and discharge from surgery wounds following a thrombectomy of right arm including brachial, radial and ulnar arteries with wrist exploration done recently. She denies any fever or chills. Pain seems worse with movement but stable when arm is rested. She also has gangrene three finger right hand for which she was to have amputations next week. She will be admitted for further management. : No acute events overnight. Slept with CPAP. She has pain but it is currently controlled. Denies CP, SOB. 
 
: No acute events overnight. Pain now controlled this AM.  Denies CP, SOB, dizziness. Review of Systems: A comprehensive review of systems was negative except for that written in the HPI. Objective:  
Physical Exam:  
 
Visit Vitals /58 (BP 1 Location: Left arm, BP Patient Position: At rest) Pulse (!) 105 Temp 97.6 °F (36.4 °C) Resp 20 Wt 113.4 kg (250 lb) SpO2 96% BMI 39.16 kg/m² O2 Device: CPAP mask Temp (24hrs), Av.8 °F (36.6 °C), Min:97.2 °F (36.2 °C), Max:98.7 °F (37.1 °C) No intake/output data recorded.  1901 -  0700 In: -  
Out: 200 [Urine:200] General:  Alert, cooperative, no distress, appears stated age, obese Head:  Normocephalic, without obvious abnormality, atraumatic. Eyes:  Conjunctivae/corneas clear. PERRL, EOMs intact. Nose: Nares normal. Septum midline. Mucosa normal. No drainage or sinus tenderness. Throat: Lips, mucosa, and tongue moist.. Neck: Supple, symmetrical, trachea midline, no adenopathy. Lungs:   Clear to auscultation bilaterally. Heart:  Irregularly irregular, S1, S2 normal, no murmur, click, rub or gallop. Abdomen:   Soft, non-tender. Bowel sounds normal. No masses,  No organomegaly. Extremities: LE trace edema. Gangrenous fingertips right hand Pulses: 2+ and symmetric all extremities. Skin: Right upper arm incision with surrounding warmth, mild edema and central drainage of exudate, no fluctuance Neurologic: CNII-XII intact. Alert and oriented X 3. Data Review:  
   
Recent Days: 
Recent Labs 05/19/19 
0500 05/18/19 
0415 05/17/19 
1822 WBC 12.0* 12.9* 20.8* HGB 9.3* 8.2* 10.3* HCT 32.5* 28.8* 35.0  
 282 393 Recent Labs 05/19/19 
0500 05/18/19 
0415 05/17/19 
1822  142 137  
K 3.5 4.2 3.1*  
* 115* 106 CO2 20* 22 24 * 124* 147* BUN 16 23* 26* CREA 0.91 0.92 1.17* CA 8.1* 7.5* 8.6 ALB  --  2.3* 2.9*  
SGOT  --  12* 19 ALT  --  28 37 No results for input(s): PH, PCO2, PO2, HCO3, FIO2 in the last 72 hours. 24 Hour Results: 
Recent Results (from the past 24 hour(s)) GLUCOSE, POC Collection Time: 05/18/19 12:12 PM  
Result Value Ref Range Glucose (POC) 123 (H) 65 - 100 mg/dL Performed by Arelis Bahena (PCT) GLUCOSE, POC Collection Time: 05/18/19  3:47 PM  
Result Value Ref Range Glucose (POC) 115 (H) 65 - 100 mg/dL Performed by Arelis Bahena (PCT) GLUCOSE, POC Collection Time: 05/18/19 10:03 PM  
Result Value Ref Range Glucose (POC) 108 (H) 65 - 100 mg/dL Performed by Nic Stone ([facility]) CBC W/O DIFF Collection Time: 05/19/19  5:00 AM  
Result Value Ref Range WBC 12.0 (H) 3.6 - 11.0 K/uL  
 RBC 3.77 (L) 3.80 - 5.20 M/uL HGB 9.3 (L) 11.5 - 16.0 g/dL HCT 32.5 (L) 35.0 - 47.0 % MCV 86.2 80.0 - 99.0 FL  
 MCH 24.7 (L) 26.0 - 34.0 PG  
 MCHC 28.6 (L) 30.0 - 36.5 g/dL  
 RDW 16.1 (H) 11.5 - 14.5 % PLATELET 166 176 - 599 K/uL MPV 10.4 8.9 - 12.9 FL  
 NRBC 0.0 0  WBC ABSOLUTE NRBC 0.00 0.00 - 0.01 K/uL METABOLIC PANEL, BASIC Collection Time: 05/19/19  5:00 AM  
Result Value Ref Range  Sodium 140 136 - 145 mmol/L  
 Potassium 3.5 3.5 - 5.1 mmol/L Chloride 114 (H) 97 - 108 mmol/L  
 CO2 20 (L) 21 - 32 mmol/L Anion gap 6 5 - 15 mmol/L Glucose 112 (H) 65 - 100 mg/dL BUN 16 6 - 20 MG/DL Creatinine 0.91 0.55 - 1.02 MG/DL  
 BUN/Creatinine ratio 18 12 - 20 GFR est AA >60 >60 ml/min/1.73m2 GFR est non-AA >60 >60 ml/min/1.73m2 Calcium 8.1 (L) 8.5 - 10.1 MG/DL  
GLUCOSE, POC Collection Time: 05/19/19  7:04 AM  
Result Value Ref Range Glucose (POC) 105 (H) 65 - 100 mg/dL Performed by Jose Yang (PCT) Cecelia Nesbitt Collection Time: 05/19/19  8:11 AM  
Result Value Ref Range Vancomycin,trough 37.6 (HH) 5.0 - 10.0 ug/mL Reported dose date: [id number] Reported dose time: 2000 Reported dose: 1750 MG UNITS Medications reviewed Current Facility-Administered Medications Medication Dose Route Frequency  morphine injection 1 mg  1 mg IntraVENous Q4H PRN  
 HYDROcodone-acetaminophen (NORCO) 5-325 mg per tablet 1 Tab  1 Tab Oral Q6H PRN  
 sodium chloride (NS) flush 5-40 mL  5-40 mL IntraVENous Q8H  
 sodium chloride (NS) flush 5-40 mL  5-40 mL IntraVENous PRN  
 acetaminophen (TYLENOL) tablet 650 mg  650 mg Oral Q4H PRN  
 naloxone (NARCAN) injection 0.4 mg  0.4 mg IntraVENous PRN  
 ondansetron (ZOFRAN) injection 4 mg  4 mg IntraVENous Q4H PRN  
 cefepime (MAXIPIME) 2 g in 0.9% sodium chloride (MBP/ADV) 100 mL  2 g IntraVENous Q8H  
 sodium chloride (NS) flush 5-10 mL  5-10 mL IntraVENous PRN  
 insulin lispro (HUMALOG) injection   SubCUTAneous AC&HS  
 glucose chewable tablet 16 g  4 Tab Oral PRN  
 dextrose (D50W) injection syrg 12.5-25 g  12.5-25 g IntraVENous PRN  
 glucagon (GLUCAGEN) injection 1 mg  1 mg IntraMUSCular PRN  
 vancomycin (VANCOCIN) 1,750 mg in 0.9% sodium chloride 500 mL IVPB  1,750 mg IntraVENous Q12H  
 apixaban (ELIQUIS) tablet 5 mg  5 mg Oral BID  cholecalciferol (VITAMIN D3) tablet 1,000 Units  1,000 Units Oral DAILY  clopidogrel (PLAVIX) tablet 75 mg  75 mg Oral DAILY  DULoxetine (CYMBALTA) capsule 60 mg  60 mg Oral DAILY  lactobac ac& pc-s.therm-b.anim (NUBIA Q/RISAQUAD)  1 Cap Oral DAILY  mirtazapine (REMERON) tablet 15 mg  15 mg Oral QHS  montelukast (SINGULAIR) tablet 10 mg  10 mg Oral QHS  pantoprazole (PROTONIX) tablet 20 mg  (Patient Supplied)  20 mg Oral ACD  sAXagliptin (ONGLYZA) tablet 5 mg  (Patient Supplied)  5 mg Oral DAILY  traMADol (ULTRAM) tablet 50 mg  50 mg Oral Q8H PRN Care Plan discussed with: Patient Total time spent with patient: 30 minutes.  
 
Leonora Diaz MD

## 2019-05-20 ENCOUNTER — APPOINTMENT (OUTPATIENT)
Dept: CT IMAGING | Age: 74
DRG: 862 | End: 2019-05-20
Attending: INTERNAL MEDICINE
Payer: MEDICARE

## 2019-05-20 LAB
ALBUMIN SERPL-MCNC: 2.3 G/DL (ref 3.5–5)
ALBUMIN/GLOB SERPL: 0.6 {RATIO} (ref 1.1–2.2)
ALP SERPL-CCNC: 213 U/L (ref 45–117)
ALT SERPL-CCNC: 31 U/L (ref 12–78)
ANION GAP SERPL CALC-SCNC: 8 MMOL/L (ref 5–15)
AST SERPL-CCNC: 27 U/L (ref 15–37)
BILIRUB SERPL-MCNC: 0.3 MG/DL (ref 0.2–1)
BUN SERPL-MCNC: 12 MG/DL (ref 6–20)
BUN/CREAT SERPL: 13 (ref 12–20)
CALCIUM SERPL-MCNC: 8.9 MG/DL (ref 8.5–10.1)
CHLORIDE SERPL-SCNC: 109 MMOL/L (ref 97–108)
CO2 SERPL-SCNC: 22 MMOL/L (ref 21–32)
CREAT SERPL-MCNC: 0.95 MG/DL (ref 0.55–1.02)
ERYTHROCYTE [DISTWIDTH] IN BLOOD BY AUTOMATED COUNT: 16.3 % (ref 11.5–14.5)
GLOBULIN SER CALC-MCNC: 4 G/DL (ref 2–4)
GLUCOSE BLD STRIP.AUTO-MCNC: 109 MG/DL (ref 65–100)
GLUCOSE BLD STRIP.AUTO-MCNC: 118 MG/DL (ref 65–100)
GLUCOSE BLD STRIP.AUTO-MCNC: 120 MG/DL (ref 65–100)
GLUCOSE BLD STRIP.AUTO-MCNC: 137 MG/DL (ref 65–100)
GLUCOSE SERPL-MCNC: 124 MG/DL (ref 65–100)
HCT VFR BLD AUTO: 33.7 % (ref 35–47)
HGB BLD-MCNC: 9.5 G/DL (ref 11.5–16)
MCH RBC QN AUTO: 24.2 PG (ref 26–34)
MCHC RBC AUTO-ENTMCNC: 28.2 G/DL (ref 30–36.5)
MCV RBC AUTO: 86 FL (ref 80–99)
NRBC # BLD: 0 K/UL (ref 0–0.01)
NRBC BLD-RTO: 0 PER 100 WBC
PLATELET # BLD AUTO: 328 K/UL (ref 150–400)
PMV BLD AUTO: 10 FL (ref 8.9–12.9)
POTASSIUM SERPL-SCNC: 3.3 MMOL/L (ref 3.5–5.1)
PROT SERPL-MCNC: 6.3 G/DL (ref 6.4–8.2)
RBC # BLD AUTO: 3.92 M/UL (ref 3.8–5.2)
SERVICE CMNT-IMP: ABNORMAL
SODIUM SERPL-SCNC: 139 MMOL/L (ref 136–145)
VANCOMYCIN SERPL-MCNC: 29.4 UG/ML
WBC # BLD AUTO: 11.1 K/UL (ref 3.6–11)

## 2019-05-20 PROCEDURE — 65270000029 HC RM PRIVATE

## 2019-05-20 PROCEDURE — 97165 OT EVAL LOW COMPLEX 30 MIN: CPT

## 2019-05-20 PROCEDURE — 97530 THERAPEUTIC ACTIVITIES: CPT

## 2019-05-20 PROCEDURE — 74011250637 HC RX REV CODE- 250/637: Performed by: INTERNAL MEDICINE

## 2019-05-20 PROCEDURE — 74011000250 HC RX REV CODE- 250: Performed by: INTERNAL MEDICINE

## 2019-05-20 PROCEDURE — 74011636320 HC RX REV CODE- 636/320: Performed by: RADIOLOGY

## 2019-05-20 PROCEDURE — 80053 COMPREHEN METABOLIC PANEL: CPT

## 2019-05-20 PROCEDURE — 74011250637 HC RX REV CODE- 250/637: Performed by: FAMILY MEDICINE

## 2019-05-20 PROCEDURE — 73201 CT UPPER EXTREMITY W/DYE: CPT

## 2019-05-20 PROCEDURE — 97535 SELF CARE MNGMENT TRAINING: CPT

## 2019-05-20 PROCEDURE — 80202 ASSAY OF VANCOMYCIN: CPT

## 2019-05-20 PROCEDURE — 97161 PT EVAL LOW COMPLEX 20 MIN: CPT

## 2019-05-20 PROCEDURE — 36415 COLL VENOUS BLD VENIPUNCTURE: CPT

## 2019-05-20 PROCEDURE — 74011250636 HC RX REV CODE- 250/636: Performed by: INTERNAL MEDICINE

## 2019-05-20 PROCEDURE — 74011000258 HC RX REV CODE- 258: Performed by: INTERNAL MEDICINE

## 2019-05-20 PROCEDURE — 94760 N-INVAS EAR/PLS OXIMETRY 1: CPT

## 2019-05-20 PROCEDURE — 85027 COMPLETE CBC AUTOMATED: CPT

## 2019-05-20 PROCEDURE — 82962 GLUCOSE BLOOD TEST: CPT

## 2019-05-20 RX ADMIN — MONTELUKAST SODIUM 10 MG: 10 TABLET, FILM COATED ORAL at 22:00

## 2019-05-20 RX ADMIN — DAPTOMYCIN 600 MG: 500 INJECTION, POWDER, LYOPHILIZED, FOR SOLUTION INTRAVENOUS at 13:59

## 2019-05-20 RX ADMIN — Medication 1 CAPSULE: at 09:00

## 2019-05-20 RX ADMIN — CEFEPIME 2 G: 2 INJECTION, POWDER, FOR SOLUTION INTRAVENOUS at 04:53

## 2019-05-20 RX ADMIN — Medication 10 ML: at 14:00

## 2019-05-20 RX ADMIN — METOPROLOL TARTRATE 12.5 MG: 25 TABLET ORAL at 09:00

## 2019-05-20 RX ADMIN — Medication 10 ML: at 22:00

## 2019-05-20 RX ADMIN — DULOXETINE HYDROCHLORIDE 60 MG: 30 CAPSULE, DELAYED RELEASE ORAL at 09:00

## 2019-05-20 RX ADMIN — HYDROCODONE BITARTRATE AND ACETAMINOPHEN 1 TABLET: 5; 325 TABLET ORAL at 04:53

## 2019-05-20 RX ADMIN — APIXABAN 5 MG: 5 TABLET, FILM COATED ORAL at 23:21

## 2019-05-20 RX ADMIN — TRAMADOL HYDROCHLORIDE 50 MG: 50 TABLET, FILM COATED ORAL at 09:00

## 2019-05-20 RX ADMIN — VITAMIN D, TAB 1000IU (100/BT) 1000 UNITS: 25 TAB at 09:00

## 2019-05-20 RX ADMIN — CLOPIDOGREL 75 MG: 75 TABLET, FILM COATED ORAL at 09:00

## 2019-05-20 RX ADMIN — IOPAMIDOL 100 ML: 612 INJECTION, SOLUTION INTRAVENOUS at 17:08

## 2019-05-20 RX ADMIN — Medication 10 ML: at 04:53

## 2019-05-20 RX ADMIN — APIXABAN 5 MG: 5 TABLET, FILM COATED ORAL at 09:00

## 2019-05-20 RX ADMIN — METOPROLOL TARTRATE 12.5 MG: 25 TABLET ORAL at 21:00

## 2019-05-20 RX ADMIN — HYDROCODONE BITARTRATE AND ACETAMINOPHEN 1 TABLET: 5; 325 TABLET ORAL at 17:31

## 2019-05-20 RX ADMIN — HYDROCODONE BITARTRATE AND ACETAMINOPHEN 1 TABLET: 5; 325 TABLET ORAL at 11:38

## 2019-05-20 RX ADMIN — MIRTAZAPINE 15 MG: 15 TABLET, FILM COATED ORAL at 23:21

## 2019-05-20 NOTE — CONSULTS
[address] Name:  Deepti Duncan 
MR#:  [id number] :  1945 ACCOUNT #:  [de-identified] DATE OF SERVICE:  2019 HISTORY OF PRESENT ILLNESS:  The patient is a 81-year-old female with past medical history significant for morbid obesity, coronary artery disease, DVT, atrial fibrillation, who was admitted to [facility] on 2019 with complaints of right arm pain. The patient was admitted to [facility] on 2019 with complaints of a cold right hand. She was seen by Vascular Surgery and found to have a right arm thromboembolus. She was taken to the OR where she underwent a thrombectomy involving the brachial, radial and ulnar arteries with wrist exploration on 2019. She developed dry gangrene involving several fingers on the right hand and elective outpatient amputation was planned. The patient states that approximately a week after the surgery, she developed erythema involving the upper right arm at the surgical site. This progressed and she presented to [facility] for further evaluation and treatment. Cultures from the wound are growing MRSA. She is currently on vancomycin and cefepime. The Infectious Disease service has been asked to assist with antibiotic management. PAST MEDICAL HISTORY:  Asthma, atrial fibrillation, fibromyalgia, coronary artery disease, thoracic spine compression fracture, diabetes mellitus, DVT occurring in pregnancy, gastroesophageal reflux disease, hypertension, non-ST-elevated myocardial infarction, obesity, sleep apnea. PAST SURGICAL HISTORY:  Hiatal hernia repair, cholecystectomy, breast lumpectomy cardiac stent, , colostomy with reversal hysterectomy, bladder sling. SOCIAL HISTORY:  No tobacco, alcohol or illicit drug use. FAMILY HISTORY:  Diabetes mellitus. ALLERGIES:  CIPROFLOXACIN CAUSES A RASH. SULFA DRUGS CAUSE A RASH. TETRACYCLINE CAUSES MUSCLE SPASM. REVIEW OF SYSTEMS:  As per HPI. Remainder of 10-system review of systems is unremarkable. OUTPATIENT MEDICATIONS:  Please see body of chart for details. She was not on antibiotics prior to admission. LABORATORY DATA:  White blood cell count is 11,100, platelet count 567,314. Creatinine is 0.95, hemoglobin A1c 7.7. Wound cultures are growing light MRSA. PHYSICAL EXAMINATION: 
VITAL SIGNS:  Temperature 98.3 degrees Fahrenheit, maximum temperature is less than 100, heart rate 96 beats per minute, blood pressure 134/64, respiratory rate 16, oxygen saturation 95% on room air. GENERAL:  Alert, in no acute distress. HEENT:  Normocephalic, atraumatic. Mucous membranes moist. 
NECK:  Supple. HEART:  Regular rate and rhythm. PULMONARY:  Clear to auscultation anteriorly. ABDOMEN:  Obese, nontender, nondistended. EXTREMITIES:  There is erythema and induration from the surgical wound site involving the proximal right upper extremity. Seropurulent fluid was expressed from the wound at the time of examination. The surrounding skin is erythematous and indurated. There is also breakdown of skin at the wrist at the postoperative site at the wrist.  There is no purulence noted. NEUROLOGIC:  Cranial nerves II-XII grossly intact. PSYCHIATRIC:  Normal affect. ASSESSMENT AND PLAN: 
1. Right upper extremity cellulitis, status post recent thrombectomy involving the brachial, radial and ulnar arteries with wrist exploration on 04/27/2019. Cultures at this admission are growing MRSA. We will obtain a CT scan to rule out abscess. Antibiotics will be changed to daptomycin. Vascular Surgery has been consulted. Blood cultures remain sterile to date. Further recommendations pending results of above. 2.  Right hand dry gangrene involving three fingers. This does not appear to be clinically infected. Continue local wound care. No antibiotic treatment planned. 3. MULTIPLE ANTIBIOTIC ALLERGIES INCLUDING SULFA, TETRACYCLINE AND CIPROFLOXACIN. 4.  Malar rash. Question drug-induced subacute cutaneous lupus erythematosus? We will change antibiotic classes. Consider Rheumatology evaluation. Thank you for allowing me to participate in the care of this patient. RG CALI DO 
 
 
[id number] 
D:  05/20/2019 12:41 T:  05/20/2019 12:46 JOB #:  [id number]

## 2019-05-20 NOTE — PROGRESS NOTES
[facility] Dosing Services:  
 
Consult for antibiotic dosing of Vancomycin by Dr. Connie Pratt (attending Dr Rickie Sabillon) Indication:sepsis in the setting cellulitis post surgery Day of Therapy 4 Vancomycin therapy: 
Current maintenance dose: pending level Goal trough 15-20 mcg/ml Last level 29.4 mcg/ml drawn 21 hrs post last 1750 mg dose of Q 12 hour schedule Plan:current kinetics suggest 1750 mg every 24 hours will maintain trough 16 mcg/ml Pharmacy to follow daily. Pharmacist Jered HERNANDEZ:[id number]

## 2019-05-20 NOTE — PROGRESS NOTES
Vascular Surgery Consult 
--full note dictated --in brief: recent right arm embolectomy requiring antecubital and wrist incisions 
--+purulent discharge/cellulitis of antecubital incision 
--I have probed and manually drained as much pus as I can from the incision and am hopeful than IV abx may be adequate 
--will follow; if no improvement, may need wound washout

## 2019-05-20 NOTE — PROGRESS NOTES
Orders received, chart reviewed and patient evaluated by occupational therapy. Recommend patient to discharge to SNF pending progression with skilled acute occupational therapy. Recommend with nursing patient to complete as able in order to maintain strength, endurance and independence: OOB to chair 3x/day, ADLs with supervision/setup and mobilizing to the bathroom for toileting with min assist. Thank you for your assistance. Full evaluation to follow.

## 2019-05-20 NOTE — PROGRESS NOTES
Daily Progress Note: 5/20/2019 Jenkins Phenix Hiram Hatchet, MD 
 
Assessment/Plan:  
Cellulitis of right upper arm (5/17/2019): infected and purulent recent surgical wounds. --Started IV cefepime and Vancomycin. --Pain control. Wound care consult --Blood culture no growth to date 
--Wound culture +MRSA --Vascular Surg and ID consulted 
  
Severe sepsis due to cellulitis POA: she has SIRS criteria with an elevated lactate in the setting of cellulitis. --lactic acid normal 
  
Type II diabetes mellitus ([facility] (10/9/2015) / Type 2 diabetes with nephropathy ([facility] (10/1/2018): 
--A1c at 7.7% BS well controlled 
  
Hypokalemia:resolved 
  
Coronary artery disease involving native coronary artery without angina pectoris (1/22/2016):  
--stable 
  
Essential hypertension (1/22/2016): BP stable. 
  
Recurrent deep vein thrombosis (DVT) ([facility] (3/30/2018) / Chronic anticoagulation (3/30/2018):  
--resume Eliquis 
  
Gangrene of finger of right hand ([facility] (5/17/2019): involving right index, mid and ring fingers. --ortho has seen, no acute need for surgery at this time 
  
Obesity (BMI 30-39.9) (11/13/2018): nutritional consult 
  
Atrial fibrillation ([facility] (11/16/2018):  
--HR up without bblocker, will re-start metoprolol at low dose - watch for hypotension 
  
Mild intermittent asthma (5/17/2019): not wheezing. Nebs as needed DVT proph - on eliquis Problem List: 
Problem List as of 5/20/2019 Date Reviewed: 4/26/2019 Codes Class Noted - Resolved * (Principal) Cellulitis of right upper arm ICD-10-CM: L03.113 ICD-9-CM: 682.3  5/17/2019 - Present Mild intermittent asthma ICD-10-CM: J45.20 ICD-9-CM: 493.90  5/17/2019 - Present Gangrene of finger of right hand [facility]) ICD-10-CM: E92 
ICD-9-CM: 785.4  5/17/2019 - Present Brachial artery thrombus (HCC) ICD-10-CM: O07.5 ICD-9-CM: 444.21  4/26/2019 - Present Bowel obstruction ([facility] ICD-10-CM: J96.502 ICD-9-CM: 560.9  1/8/2019 - Present Partial small bowel obstruction ([facility] ICD-10-CM: K56.600 ICD-9-CM: 560.9  1/5/2019 - Present Sleep apnea ICD-10-CM: G47.30 ICD-9-CM: 780.57  1/5/2019 - Present Overview Signed 1/5/2019  8:41 PM by Sandra Ortiz, DO  
  wears CPAP Atrial fibrillation [facility]) ICD-10-CM: I48.91 
ICD-9-CM: 427.31  11/16/2018 - Present Dyspnea ICD-10-CM: R06.00 
ICD-9-CM: 786.09  11/13/2018 - Present ARF (acute renal failure) (HCC) ICD-10-CM: N17.9 ICD-9-CM: 584.9  11/13/2018 - Present Obesity (BMI 30-39.9) (Chronic) ICD-10-CM: A38.3 ICD-9-CM: 278.00  11/13/2018 - Present Closed wedge compression fracture of T7 vertebra ([facility] ICD-10-CM: V46.655M ICD-9-CM: 805.2  11/13/2018 - Present Type 2 diabetes with nephropathy ([facility] ICD-10-CM: E11.21 
ICD-9-CM: 250.40, 583.81  10/1/2018 - Present Chest pain ICD-10-CM: R07.9 ICD-9-CM: 786.50  6/27/2018 - Present Generalized abdominal pain ICD-10-CM: R10.84 ICD-9-CM: 789.07  6/27/2018 - Present Recurrent deep vein thrombosis (DVT) (HCC) ICD-10-CM: I82.409 ICD-9-CM: 453.40  3/30/2018 - Present Chronic anticoagulation (Chronic) ICD-10-CM: Z79.01 
ICD-9-CM: V58.61  3/30/2018 - Present Coronary artery disease involving native coronary artery without angina pectoris (Chronic) ICD-10-CM: I25.10 ICD-9-CM: 414.01  1/22/2016 - Present Essential hypertension (Chronic) ICD-10-CM: I10 
ICD-9-CM: 401.9  1/22/2016 - Present Type II diabetes mellitus (HCC) (Chronic) ICD-10-CM: E11.9 ICD-9-CM: 250.00  10/9/2015 - Present NSTEMI (non-ST elevated myocardial infarction) ([facility] ICD-10-CM: I21.4 ICD-9-CM: 410.70  10/9/2015 - Present RESOLVED: CAD (coronary artery disease) ICD-10-CM: I25.10 ICD-9-CM: 414.00  10/9/2015 - 1/22/2016 RESOLVED: HTN (hypertension) ICD-10-CM: I10 
ICD-9-CM: 401.9  10/9/2015 - 1/22/2016 Subjective: H&P: Ms. Mary Veronica is a 72 y.o. female who is being admitted for cellulitis of right upper arm. Ms. Mary Veronica presented to our Emergency Department today complaining of a progressive swelling, redness and discharge from surgery wounds following a thrombectomy of right arm including brachial, radial and ulnar arteries with wrist exploration done recently. She denies any fever or chills. Pain seems worse with movement but stable when arm is rested. She also has gangrene three finger right hand for which she was to have amputations next week. She will be admitted for further management. : No acute events overnight. Slept with CPAP. She has pain but it is currently controlled. Denies CP, SOB. 
 
: No acute events overnight. Pain now controlled this AM.  Denies CP, SOB, dizziness. :  No new complaints. Little pain currently. Continues on Vanc/Maxipime. ID and Vasc surg consulted. Review of Systems: A comprehensive review of systems was negative except for that written in the HPI. Objective:  
Physical Exam:  
 
Visit Vitals /61 (BP 1 Location: Left arm, BP Patient Position: At rest) Pulse 100 Temp 98.3 °F (36.8 °C) Resp 18 Wt 113.4 kg (250 lb) SpO2 96% BMI 39.16 kg/m² O2 Device: CPAP mask Temp (24hrs), Av °F (36.7 °C), Min:97.5 °F (36.4 °C), Max:98.4 °F (36.9 °C) No intake/output data recorded. No intake/output data recorded. General:  Alert, cooperative, no distress, appears stated age, obese Head:  Normocephalic, without obvious abnormality, atraumatic. Eyes:  Conjunctivae/corneas clear. PERRL, EOMs intact. Nose: Nares normal. Septum midline. Mucosa normal. No drainage or sinus tenderness. Throat: Lips, mucosa, and tongue moist.. Neck: Supple, symmetrical, trachea midline, no adenopathy. Lungs:   Clear to auscultation bilaterally. Heart:  Irregularly irregular, S1, S2 normal, no murmur, click, rub or gallop. Abdomen:   Soft, non-tender. Bowel sounds normal. No masses,  No organomegaly. Extremities: LE trace edema. Dry gangre fingers right hand unchanged Skin: Right upper arm incision with surrounding warmth, mild edema and central drainage of exudate, no fluctuance Neurologic: CNII-XII intact. Alert and oriented X 3. Data Review:  
   
Recent Days: 
Recent Labs  
  05/20/19 
0450 05/19/19 
0500 05/18/19 
0415 WBC 11.1* 12.0* 12.9* HGB 9.5* 9.3* 8.2* HCT 33.7* 32.5* 28.8*  
 315 282 Recent Labs  
  05/20/19 
0450 05/19/19 
0500 05/18/19 
0415 05/17/19 
1822  140 142 137  
K 3.3* 3.5 4.2 3.1*  
* 114* 115* 106 CO2 22 20* 22 24 * 112* 124* 147* BUN 12 16 23* 26* CREA 0.95 0.91 0.92 1.17* CA 8.9 8.1* 7.5* 8.6 ALB 2.3*  --  2.3* 2.9*  
SGOT 27  --  12* 19 ALT 31  --  28 37 No results for input(s): PH, PCO2, PO2, HCO3, FIO2 in the last 72 hours. 24 Hour Results: 
Recent Results (from the past 24 hour(s)) GLUCOSE, POC Collection Time: 05/19/19  7:04 AM  
Result Value Ref Range Glucose (POC) 105 (H) 65 - 100 mg/dL Performed by Wilma Miles ([facility]) Bella Veloz Collection Time: 05/19/19  8:11 AM  
Result Value Ref Range Vancomycin,trough 37.6 (HH) 5.0 - 10.0 ug/mL Reported dose date: 14352524 Reported dose time: 2000 Reported dose: 1750 MG UNITS  
GLUCOSE, POC Collection Time: 05/19/19 10:52 AM  
Result Value Ref Range Glucose (POC) 121 (H) 65 - 100 mg/dL Performed by Jim Puga ([facility]) GLUCOSE, POC Collection Time: 05/19/19  4:03 PM  
Result Value Ref Range Glucose (POC) 126 (H) 65 - 100 mg/dL Performed by Jim Puga ([facility]) GLUCOSE, POC Collection Time: 05/19/19  9:04 PM  
Result Value Ref Range Glucose (POC) 117 (H) 65 - 100 mg/dL Performed by Oleg Garvin ([facility]) Edith Bautista Collection Time: 05/20/19  4:50 AM  
Result Value Ref Range Vancomycin, random 29.4 UG/ML  
CBC W/O DIFF Collection Time: 05/20/19  4:50 AM  
Result Value Ref Range WBC 11.1 (H) 3.6 - 11.0 K/uL  
 RBC 3.92 3.80 - 5.20 M/uL HGB 9.5 (L) 11.5 - 16.0 g/dL HCT 33.7 (L) 35.0 - 47.0 % MCV 86.0 80.0 - 99.0 FL  
 MCH 24.2 (L) 26.0 - 34.0 PG  
 MCHC 28.2 (L) 30.0 - 36.5 g/dL  
 RDW 16.3 (H) 11.5 - 14.5 % PLATELET 197 177 - 717 K/uL MPV 10.0 8.9 - 12.9 FL  
 NRBC 0.0 0  WBC ABSOLUTE NRBC 0.00 0.00 - 0.01 K/uL METABOLIC PANEL, COMPREHENSIVE Collection Time: 05/20/19  4:50 AM  
Result Value Ref Range Sodium 139 136 - 145 mmol/L Potassium 3.3 (L) 3.5 - 5.1 mmol/L Chloride 109 (H) 97 - 108 mmol/L  
 CO2 22 21 - 32 mmol/L Anion gap 8 5 - 15 mmol/L Glucose 124 (H) 65 - 100 mg/dL BUN 12 6 - 20 MG/DL Creatinine 0.95 0.55 - 1.02 MG/DL  
 BUN/Creatinine ratio 13 12 - 20 GFR est AA >60 >60 ml/min/1.73m2 GFR est non-AA 58 (L) >60 ml/min/1.73m2 Calcium 8.9 8.5 - 10.1 MG/DL Bilirubin, total 0.3 0.2 - 1.0 MG/DL  
 ALT (SGPT) 31 12 - 78 U/L  
 AST (SGOT) 27 15 - 37 U/L Alk. phosphatase 213 (H) 45 - 117 U/L Protein, total 6.3 (L) 6.4 - 8.2 g/dL Albumin 2.3 (L) 3.5 - 5.0 g/dL Globulin 4.0 2.0 - 4.0 g/dL A-G Ratio 0.6 (L) 1.1 - 2.2 Medications reviewed Current Facility-Administered Medications Medication Dose Route Frequency  metoprolol tartrate (LOPRESSOR) tablet 12.5 mg  12.5 mg Oral Q12H  Vancomycin - Pharmacy dosing pending level  1 Each Other Rx Dosing/Monitoring  morphine injection 1 mg  1 mg IntraVENous Q4H PRN  
 HYDROcodone-acetaminophen (NORCO) 5-325 mg per tablet 1 Tab  1 Tab Oral Q6H PRN  
 sodium chloride (NS) flush 5-40 mL  5-40 mL IntraVENous Q8H  
 sodium chloride (NS) flush 5-40 mL  5-40 mL IntraVENous PRN  
 acetaminophen (TYLENOL) tablet 650 mg  650 mg Oral Q4H PRN  
 naloxone (NARCAN) injection 0.4 mg  0.4 mg IntraVENous PRN  
  ondansetron (ZOFRAN) injection 4 mg  4 mg IntraVENous Q4H PRN  
 cefepime (MAXIPIME) 2 g in 0.9% sodium chloride (MBP/ADV) 100 mL  2 g IntraVENous Q8H  
 sodium chloride (NS) flush 5-10 mL  5-10 mL IntraVENous PRN  
 insulin lispro (HUMALOG) injection   SubCUTAneous AC&HS  
 glucose chewable tablet 16 g  4 Tab Oral PRN  
 dextrose (D50W) injection syrg 12.5-25 g  12.5-25 g IntraVENous PRN  
 glucagon (GLUCAGEN) injection 1 mg  1 mg IntraMUSCular PRN  
 apixaban (ELIQUIS) tablet 5 mg  5 mg Oral BID  cholecalciferol (VITAMIN D3) tablet 1,000 Units  1,000 Units Oral DAILY  clopidogrel (PLAVIX) tablet 75 mg  75 mg Oral DAILY  DULoxetine (CYMBALTA) capsule 60 mg  60 mg Oral DAILY  lactobac ac& pc-s.therm-b.anim (NUBIA Q/RISAQUAD)  1 Cap Oral DAILY  mirtazapine (REMERON) tablet 15 mg  15 mg Oral QHS  montelukast (SINGULAIR) tablet 10 mg  10 mg Oral QHS  pantoprazole (PROTONIX) tablet 20 mg  (Patient Supplied)  20 mg Oral ACD  sAXagliptin (ONGLYZA) tablet 5 mg  (Patient Supplied)  5 mg Oral DAILY  traMADol (ULTRAM) tablet 50 mg  50 mg Oral Q8H PRN Care Plan discussed with: Patient Total time spent with patient: 30 minutes.  
 
Riya Vaz MD

## 2019-05-20 NOTE — PROGRESS NOTES
5/20/2019 10:22 AM CM received return phone call from pt's daughter, Andrew Foote. Charted address and phone numbers were confirmed. Reason for Readmission: Cellulitis of right upper arm RRAT Score and Risk Level:  36 high Level of Readmission: level 1 [facility] from 4/26-4/29 for Brachial Artery Thrombus Care Conference scheduled: tbd Resources/supports as identified by patient/family: Supportive daughter Top Challenges facing patient (as identified by patient/family and CM): Finances/Medication cost?   No concerns, has rx coverage and fills her scripts at the [facility]. Pt's daughter handles all of pt's medications. Transportation      No concerns, pt's daughter will trnasport pt home at discharge. Support system or lack thereof? No concerns, supportive daughter. Living arrangements? No concerns, lives in independent living at [facility]. Self-care/ADLs/Cognition? No concerns, was able to perform all adls herself prior to admission. Pt was using a rollator to assist with ambulation. Pt does own a rw, cane and wheelchair. Current Advanced Directive/Advance Care Plan:   
Name Relation Home Work Mobile Carlos Stubbs Daughter [phone] Plan for utilizing home health:  Yes, pt was open to home health nursing for wound care of her left leg through [facility]. CM called and confirmed with [facility] they were seeing pt 2x a week for nursing. Pt's daughter confirmed she would like to use this agency again. Referral sent to [facility] via [employer]. Likelihood of additional readmission: high Transition of Care Plan:    Based on readmission, the patient's previous Plan of Care 
 has been evaluated and/or modified. The current Transition of Care Plan is: return to [address] living with home health through [facility]. Pt has been to rehab at [facility] in the past.  
CM will follow for final discharge needs. Carlotta Stein, BSW Care Management Interventions PCP Verified by CM: Pat Farley, no nurse navigator) Transition of Care Consult (CM Consult): Discharge Planning MyChart Signup: No 
Discharge Durable Medical Equipment: No 
Physical Therapy Consult: Yes Occupational Therapy Consult: Yes Speech Therapy Consult: No 
Current Support Network: Lives Alone Discharge Location Discharge Placement: Home with home health 5/20/2019 10:14 AM Case management consult for discharge planning received. Attempted to meet with pt, pt resting. CM called and lvm with pt's daughter,  [facility] ALISA([phone]). EMR reviewed, pt is a readmission, was at [facility] from 4/26-4/29 for Brachial Artery Thrombus. Pt was discharged home, she lives in the independent living section at [facility]. CM will follow up. Carlotta Stein, BSW

## 2019-05-20 NOTE — PROGRESS NOTES
Problem: Mobility Impaired (Adult and Pediatric) Goal: *Acute Goals and Plan of Care (Insert Text) Description Physical Therapy Goals Initiated 5/20/2019 1. Patient will move from supine to sit and sit to supine  in bed with modified independence within 7 day(s). 2.  Patient will transfer from bed to chair and chair to bed with modified independence using the least restrictive device within 7 day(s). 3.  Patient will perform sit to stand with modified independence within 7 day(s). 4.  Patient will ambulate with modified independence for 100 feet with the least restrictive device within 7 day(s). Outcome: Progressing Towards Goal 
PHYSICAL THERAPY EVALUATION Patient: Esa Felton (53 y.o. female) Date: 5/20/2019 Primary Diagnosis: Cellulitis of right upper arm [L03.113] Precautions: Contact; Falls ASSESSMENT : 
Based on the objective data described below, the patient presents with generally decreased strength, decreased endurance, impaired standing balance, lightheadedness and nausea with brief standing activity, and limited functional mobility on day 3 of admission with RUE cellulitis s/p recent RUE thrombectomy. Pt reports modified independent baseline ambulation using cane vs. Rollator walker. Pt agreeable to PT evaluation but participation limited by nausea and progressive lightheadedness reported in standing and necessitating return to supine for recovery. She requires up to [facility] for limited mobility described below. Recommend 24-hour assistance for mobility at current functional level. Patient will benefit from skilled intervention to address the above impairments. Patient?s rehabilitation potential is considered to be Good Factors which may influence rehabilitation potential include:  
? None noted ? Mental ability/status ? Medical condition ? Home/family situation and support systems ? Safety awareness ?         Pain tolerance/management 
? Other: PLAN : 
Recommendations and Planned Interventions: 
?           Bed Mobility Training             ? Neuromuscular Re-Education ? Transfer Training                   ? Orthotic/Prosthetic Training 
? Gait Training                         ? Modalities ? Therapeutic Exercises           ? Edema Management/Control ? Therapeutic Activities            ? Patient and Family Training/Education ? Other (comment): Frequency/Duration: Patient will be followed by physical therapy  5 times a week to address goals. Discharge Recommendations: Home Health Further Equipment Recommendations for Discharge: TBD SUBJECTIVE:  
Patient stated ? I felt a little off balance,? re: ambulation to/from restroom. Pt received supine, agreeable to PT and cleared by RN. OBJECTIVE DATA SUMMARY:  
HISTORY:   
Past Medical History:  
Diagnosis Date Asthma Atrial fibrillation ([facility] 11/16/2018 Autoimmune disease ([facility]   
 fibromyalgia CAD (coronary artery disease) 10/9/2015 Closed wedge compression fracture of T7 vertebra ([facility] 11/13/2018 Diabetes ([facility] DVT (deep vein thrombosis) in pregnancy ([facility] GERD (gastroesophageal reflux disease) HTN (hypertension) NSTEMI (non-ST elevated myocardial infarction) ([facility] 10/9/2015 Obesity (BMI 30-39.9) 11/13/2018 Sleep apnea   
 wears CPAP Past Surgical History:  
Procedure Laterality Date ABDOMEN SURGERY PROC UNLISTED    
 hital hernia repair, gall bladder removed BREAST SURGERY PROCEDURE UNLISTED    
 lumpectomy CARDIAC SURG PROCEDURE UNLIST  10/9/15  
 2 stents [facility] 64 HX COLOSTOMY    
 and reversal, post episiotomy repair [facility] UROLOGICAL  2009  
 bladder sling Prior Level of Function/Home Situation: states modified independent baseline ambulation, lives alone but able to arrange 24-hour supervision/assist if necessary. Personal factors and/or comorbidities impacting plan of care: as above. Home Situation Home Environment: Private residence # Steps to Enter: 0 One/Two Story Residence: One story Living Alone: Yes Support Systems: Family member(s) Current DME Used/Available at Home: [place] straight, [employer], rollator EXAMINATION/PRESENTATION/DECISION MAKING:  
Critical Behavior: 
Neurologic State: Alert, Appropriate for age Orientation Level: Oriented X4 Hearing: Auditory Auditory Impairment: Hearing aid(s) Hearing Aids/Status: At home Skin:RUE kerlix wrap in place Range Of Motion: 
AROM: Generally decreased, functional 
  
  
  
  
  
  
  
Strength:   
Strength: Generally decreased, functional 
  
  
  
  
  
  
Tone & Sensation:  
Tone: Normal 
  
  
  
  
  
  
  
  
   
Coordination: 
Coordination: Within functional limits Vision:  
  
Functional Mobility: 
Bed Mobility: 
  
Supine to Sit: Additional time;Supervision Sit to Supine: Additional time;Supervision Scooting: Additional time;Contact guard assistance Transfers: 
Sit to Stand: Additional time;Contact guard assistance Stand to Sit: Additional time;Contact guard assistance Balance:  
Sitting: Without support Sitting - Static: Good (unsupported) Sitting - Dynamic: Good (unsupported) Standing: Without support Standing - Static: Good Standing - Dynamic : Fair Ambulation/Gait Training: 
Distance (ft): 1 Feet (ft) Assistive Device: Gait belt Ambulation - Level of Assistance: Minimal assistance Gait Abnormalities: Decreased step clearance Base of Support: Widened Speed/Es: Pace decreased (<100 feet/min) Pt stands for assisted lower body dressing, then reports lightheadedness/dizziness and nausea requiring need to sit, then return to supine for recovery. Functional Measure: Barthel Index: 
Bathin Bladder: 10 Bowels: 5 Groomin Dressin Feeding: 10 Mobility: 0 Stairs: 0 Toilet Use: 5 Transfer (Bed to Chair and Back): 10 Total: 50/100 Percentage of impairment  
0% 1-19% 20-39% 40-59% 60-79% 80-99% 100% Barthel Score 0-100 100 99-80 79-60 59-40 20-39 1-19 
 0 The Barthel ADL Index: Guidelines 1. The index should be used as a record of what a patient does, not as a record of what a patient could do. 2. The main aim is to establish degree of independence from any help, physical or verbal, however minor and for whatever reason. 3. The need for supervision renders the patient not independent. 4. A patient's performance should be established using the best available evidence. Asking the patient, friends/relatives and nurses are the usual sources, but direct observation and common sense are also important. However direct testing is not needed. 5. Usually the patient's performance over the preceding 24-48 hours is important, but occasionally longer periods will be relevant. 6. Middle categories imply that the patient supplies over 50 per cent of the effort. 7. Use of aids to be independent is allowed. Patricia Gomez., Barthel, D.W. ([phone]). Functional evaluation: the Barthel Index. [facility] (14)2. [facility] DAMI Riley, Jazzy Richard., [facility], [address] [place] [address] (). Measuring the change indisability after inpatient rehabilitation; comparison of the responsiveness of the Barthel Index and Functional Gardners Measure. Journal of Neurology, Neurosurgery, and Psychiatry, 66(4), 221-580. Mayo Arrieta, N.[place].A, SANIYA Parry, & Alesia Ta MALEYDA. (2004.) Assessment of post-stroke quality of life in cost-effectiveness studies: The usefulness of the Barthel Index and the EuroQoL-5D. [facility], 13, 012-39 Physical Therapy Evaluation Charge Determination History Examination Presentation Decision-Making HIGH Complexity :3+ comorbidities / personal factors will impact the outcome/ POC  HIGH Complexity : 4+ Standardized tests and measures addressing body structure, function, activity limitation and / or participation in recreation  MEDIUM Complexity : Evolving with changing characteristics  Other outcome measures barthel  MEDIUM Based on the above components, the patient evaluation is determined to be of the following complexity level: MEDIUM Pain: 
Pain Scale 1: Numeric (0 - 10) Pain Intensity 1: 3 Pain Location 1: Arm Pain Orientation 1: Right Pain Description 1: Aching; Throbbing Pain Intervention(s) 1: Medication (see MAR) Activity Tolerance:  
Limited by presyncopal symptoms. VSS. Please refer to the flowsheet for vital signs taken during this treatment. After treatment:  
?         Patient left in no apparent distress sitting up in chair ? Patient left in no apparent distress in bed 
? Call bell left within reach ? Nursing notified ? Caregiver present ? Bed alarm activated COMMUNICATION/EDUCATION:  
The patient?s plan of care was discussed with: Registered Nurse. ?         Fall prevention education was provided and the patient/caregiver indicated understanding. ? Patient/family have participated as able in goal setting and plan of care. ?         Patient/family agree to work toward stated goals and plan of care. ?         Patient understands intent and goals of therapy, but is neutral about his/her participation. ? Patient is unable to participate in goal setting and plan of care. Thank you for this referral. 
Jean Marie Mcmillan, PT, DPT Time Calculation: 20 mins

## 2019-05-20 NOTE — WOUND CARE
72 y.o. female Admitted for cellulitis of right upper arm. Patient underwent surgery on 4/26/19 for radial, ulna and Brachial thrombectomy. About 2-3 weeks later patient noticed loss of circulation and discoloration to the 2nd, 3rd, and 4th fingers. Progressive redness and swelling developed shortly after the finger discoloration. PMH includes Afib, Fibromyalgia, CAD, DM, DVT, HTN, NSTEMI, Obesity, and asthma. Wound care consulted for eval of the right arm. Assessment: 
Patient is resting comfortably in bed with complaint of arm pain. Turns well in bed without assist and ambulates to the bathroom. Skin assessment finds a small superficial gluteal fissure measuring approximately 3cm in length. The bilateral lower legs have hemosiderin appearing discoloration with a medial anterior scar to the right leg and two anterior wounds to the left leg. The wounds measure approximately 0.5x1.5cm proximal and 2x1.5cm distal. Wound beds are superficial, moist, pink/red partially granulating with attached edges and scant exudate. Lindsay-wound skin is dry and fragile. Pedal Pulses are intermittently palpable. The right arm appears cellulitic from fingers to mid upper arm. The brachial surgical site has three open areas distal, medial and proximal to the patient along an intact resurfaced scar. The open areas measure less than 0.5cm with yellow wound beds, lindsay-wound induration and heat on palpation. The distal brachial wound bed expells scant sero-purulent drainage on palpation and is friable with depth measurement (0.2cm depth). The radial wounds measure approximately 0.5-0.7cm in size with a depth of approximately 0.1-0.3cm. Wound beds are yellow with scant serous exudate and lindsay-wound edema/erythema. The right hand is edemetous with discoloration/necrosis to the pad/tip of the second finger. Discoloration/necrosis to the tip and partial 1st joint of the third finger.  There is eschar/discoloration a the tip to partial 2nd joint of the third finger. The fourth finger is cold and pale on palpation. ID and surgery consults placed. Re-evaluated wounds with ID at bedside for wound management (iodosorb). Recommendation: 
Daily with skin care apply lotion to extremities and bony prominences for protection from friction/shear. Float heels and protect (with posey elbow/heel protectors). Have patient Turn Q2h while in bed. Protect from lines and devices. When up in chair use a waffle cushion and reposition every 20 minutes. To the gluteal fissure recommend Zinc barrier cream to protect from erosion daily with skin care. To the anterior left leg wound recommend cleansing and apply mepilex border dressing to cover. Change every 2-3 days as needed. Check under dressing daily. To the right arm wound suggest iodosorb to the wound beds, cover with curity adaptec dressing, secure with gauze and kerlex. Daily with skin care. Consider betadine to the necrotic fingers daily with skin care.   
 
Will Follow, Consult as needed,  
Thao REYESN MAADI [facility], [facility].

## 2019-05-20 NOTE — PROGRESS NOTES
Problem: Self Care Deficits Care Plan (Adult) Goal: *Acute Goals and Plan of Care (Insert Text) Description Occupational Therapy Goals Initiated 5/20/2019 1. Patient will perform grooming with modified independence within 7 day(s). 2.  Patient will perform bathing with minimal assistance/contact guard assist within 7 day(s). 3.  Patient will perform lower body dressing with minimal assistance/contact guard assist within 7 day(s). 4.  Patient will perform toilet transfers with modified independence within 7 day(s). 5.  Patient will perform all aspects of toileting with modified independence within 7 day(s). 6.  Patient will participate in upper extremity therapeutic exercise/activities with supervision/set-up for 10 minutes within 7 day(s). 7.  Patient will utilize energy conservation techniques during functional activities with verbal cues within 7 day(s). Outcome: Progressing Towards Goal 
 OCCUPATIONAL THERAPY EVALUATION Patient: Judge Ramirez (80 y.o. female) Date: 5/20/2019 Primary Diagnosis: Cellulitis of right upper arm [L03.113] Precautions: Falls ASSESSMENT : 
Based on the objective data described below, the patient presents with decrease endurance, decrease strength, increase SOB with activity and decrease ability to use R UE from cellulitis/gangrenous digits (index, middle, index). PTA, pt was living in [facility] living and used rollator to access her apt and community. Pt still drives and able to get own groceries. Today, pt is clearly not at baseline. Pending surgery, feel pt would benefit from further skilled acute OT and recommend further therapy at discharge. Patient will benefit from skilled intervention to address the above impairments. Patient?s rehabilitation potential is considered to be Excellent Factors which may influence rehabilitation potential include: ? None noted ? Mental ability/status ?             Medical condition ? Home/family situation and support systems ? Safety awareness ? Pain tolerance/management ? Other: PLAN : 
Recommendations and Planned Interventions: 
?               Self Care Training                  ? Therapeutic Activities ? Functional Mobility Training    ? Cognitive Retraining 
? Therapeutic Exercises           ? Endurance Activities ? Balance Training                   ? Neuromuscular Re-Education ? Visual/Perceptual Training     ? Home Safety Training 
? Patient Education                 ? Family Training/Education ? Other (comment): Frequency/Duration: Patient will be followed by occupational therapy 5 times a week to address goals. Discharge Recommendations: Brandon Banks Further Equipment Recommendations for Discharge: TBD SUBJECTIVE:  
Patient stated ? OK, I will get to the chair. ? OBJECTIVE DATA SUMMARY:  
HISTORY:  
Past Medical History:  
Diagnosis Date Asthma Atrial fibrillation ([facility] [address] 11/16/2018 Autoimmune disease ([facility] [address]   
 fibromyalgia CAD (coronary artery disease) 10/9/2015 Closed wedge compression fracture of T7 vertebra ([facility] [address] 11/13/2018 Diabetes ([facility] [address] DVT (deep vein thrombosis) in pregnancy ([facility] [address] GERD (gastroesophageal reflux disease) HTN (hypertension) NSTEMI (non-ST elevated myocardial infarction) ([facility] [address] 10/9/2015 Obesity (BMI 30-39.9) 11/13/2018 Sleep apnea   
 wears CPAP Past Surgical History:  
Procedure Laterality Date ABDOMEN SURGERY PROC UNLISTED    
 hital hernia repair, gall bladder removed BREAST SURGERY PROCEDURE UNLISTED    
 lumpectomy CARDIAC SURG PROCEDURE UNLIST  10/9/15  
 2 stents [facility] 64 HX COLOSTOMY    
 and reversal, post episiotomy repair [address] [facility] UROLOGICAL  2009  
 bladder sling Prior Level of Function/Environment/Context: see above Occupations in which the patient is/was successful, what are the barriers preventing that success:  
Performance Patterns (routines, roles, habits, and rituals):  
Personal Interests and/or values:  
Expanded or extensive additional review of patient history:  
 
Home Situation Home Environment: Private residence # Steps to Enter: 0 One/Two Story Residence: One story Living Alone: Yes Support Systems: Family member(s) Current DME Used/Available at Home: Macky Kussmaul, straight, Dorn Olszewski, rollator Hand dominance: Right EXAMINATION OF PERFORMANCE DEFICITS: 
Cognitive/Behavioral Status: 
Neurologic State: Alert Orientation Level: Oriented X4 Cognition: Appropriate decision making Safety/Judgement: Awareness of environment Skin: impaired, fragile skin, bruises easily Edema: Cellulitis R UE Hearing: Auditory Auditory Impairment: Hearing aid(s) Hearing Aids/Status: At home Vision/Perceptual:   
    
    
    
  
    
    
Corrective Lenses: Reading glasses Range of Motion: 
AROM: Generally decreased, functional 
  
  
  
  
  
  
  
Strength: 
Strength: Generally decreased, functional 
  
  
  
  
Coordination: 
Coordination: Within functional limits Fine Motor Skills-Upper: Right Impaired;Left Intact Gross Motor Skills-Upper: Left Intact; Right Intact Tone & Sensation: 
Tone: Normal 
  
  
  
  
  
  
  
Balance: 
Sitting: Without support Sitting - Static: Good (unsupported) Sitting - Dynamic: Good (unsupported) Standing: Without support Standing - Static: Good Standing - Dynamic : Fair Functional Mobility and Transfers for ADLs: 
Bed Mobility: 
Supine to Sit: Additional time;Supervision Sit to Supine: Additional time;Supervision Scooting: Additional time;Contact guard assistance Transfers: 
Sit to Stand: Additional time;Contact guard assistance Stand to Sit: Additional time;Contact guard assistance Bed to Chair: Contact guard assistance ADL Assessment: 
Feeding: Setup Oral Facial Hygiene/Grooming: Minimum assistance Bathing: Moderate assistance Upper Body Dressing: Stand-by assistance Lower Body Dressing: Moderate assistance Toileting: Moderate assistance ADL Intervention and task modifications: 
  
 
  
 
  
 
  
 
  
 
  
 
  
 
Cognitive Retraining Safety/Judgement: Awareness of environment Therapeutic Exercise: 
 
 
Functional Measure: 
Barthel Index: 
Bathin Bladder: 10 Bowels: 5 Groomin Dressin Feeding: 10 Mobility: 0 Stairs: 0 Toilet Use: 5 Transfer (Bed to Chair and Back): 10 Total: 50/100 Percentage of impairment  
0% 1-19% 20-39% 40-59% 60-79% 80-99% 100% Barthel Score 0-100 100 99-80 79-60 59-40 20-39 1-19 
 0 The Barthel ADL Index: Guidelines 1. The index should be used as a record of what a patient does, not as a record of what a patient could do. 2. The main aim is to establish degree of independence from any help, physical or verbal, however minor and for whatever reason. 3. The need for supervision renders the patient not independent. 4. A patient's performance should be established using the best available evidence. Asking the patient, friends/relatives and nurses are the usual sources, but direct observation and common sense are also important. However direct testing is not needed. 5. Usually the patient's performance over the preceding 24-48 hours is important, but occasionally longer periods will be relevant. 6. Middle categories imply that the patient supplies over 50 per cent of the effort. 7. Use of aids to be independent is allowed. Bryson Liang., Barthel, D.W. (0018). Functional evaluation: the Barthel Index. [address] [facility] (14)2.  
DAMI Castro, Lynn Sandoval., Gregor Adrian., Nina Shen. (1999). Measuring the change indisability after inpatient rehabilitation; comparison of the responsiveness of the Barthel Index and Functional Schuyler Measure. Journal of Neurology, Neurosurgery, and Psychiatry, 66(4), 669-310. PARRISH Walker, SANIYA Parry, & Juan F Oliver M.A. (2004.) Assessment of post-stroke quality of life in cost-effectiveness studies: The usefulness of the Barthel Index and the EuroQoL-5D. [facility], 13, 253-88 Occupational Therapy Evaluation Charge Determination History Examination Decision-Making LOW Complexity : Brief history review  LOW Complexity : 1-3 performance deficits relating to physical, cognitive , or psychosocial skils that result in activity limitations and / or participation restrictions  LOW Complexity : No comorbidities that affect functional and no verbal or physical assistance needed to complete eval tasks Based on the above components, the patient evaluation is determined to be of the following complexity level: LOW Pain: 
Pain Scale 1: Numeric (0 - 10) Pain Intensity 1: 2 Pain Location 1: Arm Pain Orientation 1: Right Pain Description 1: Aching; Throbbing Pain Intervention(s) 1: Medication (see MAR) Activity Tolerance:  
Fair Please refer to the flowsheet for vital signs taken during this treatment. After treatment:  
? Patient left in no apparent distress sitting up in chair ? Patient left in no apparent distress in bed 
? Call bell left within reach ? Nursing notified ? Caregiver present ? Bed alarm activated COMMUNICATION/EDUCATION:  
The patient?s plan of care was discussed with: Physical Therapist and Registered Nurse. 
? Home safety education was provided and the patient/caregiver indicated understanding. ? Patient/family have participated as able in goal setting and plan of care. ? Patient/family agree to work toward stated goals and plan of care. ? Patient understands intent and goals of therapy, but is neutral about his/her participation. ? Patient is unable to participate in goal setting and plan of care. This patient?s plan of care is appropriate for delegation to D[facility]. Thank you for this referral. 
Keerthi Evangelista, OTR/L Time Calculation: 31 mins

## 2019-05-21 LAB
ALBUMIN SERPL-MCNC: 2.3 G/DL (ref 3.5–5)
ALBUMIN/GLOB SERPL: 0.6 {RATIO} (ref 1.1–2.2)
ALP SERPL-CCNC: 198 U/L (ref 45–117)
ALT SERPL-CCNC: 27 U/L (ref 12–78)
ANION GAP SERPL CALC-SCNC: 8 MMOL/L (ref 5–15)
AST SERPL-CCNC: 21 U/L (ref 15–37)
BASOPHILS # BLD: 0.1 K/UL (ref 0–0.1)
BASOPHILS NFR BLD: 1 % (ref 0–1)
BILIRUB SERPL-MCNC: 0.3 MG/DL (ref 0.2–1)
BUN SERPL-MCNC: 8 MG/DL (ref 6–20)
BUN/CREAT SERPL: 9 (ref 12–20)
CALCIUM SERPL-MCNC: 9.1 MG/DL (ref 8.5–10.1)
CHLORIDE SERPL-SCNC: 107 MMOL/L (ref 97–108)
CK SERPL-CCNC: 18 U/L (ref 26–192)
CO2 SERPL-SCNC: 23 MMOL/L (ref 21–32)
CREAT SERPL-MCNC: 0.86 MG/DL (ref 0.55–1.02)
DIFFERENTIAL METHOD BLD: ABNORMAL
EOSINOPHIL # BLD: 0.7 K/UL (ref 0–0.4)
EOSINOPHIL NFR BLD: 6 % (ref 0–7)
ERYTHROCYTE [DISTWIDTH] IN BLOOD BY AUTOMATED COUNT: 16.6 % (ref 11.5–14.5)
GLOBULIN SER CALC-MCNC: 3.9 G/DL (ref 2–4)
GLUCOSE BLD STRIP.AUTO-MCNC: 104 MG/DL (ref 65–100)
GLUCOSE BLD STRIP.AUTO-MCNC: 118 MG/DL (ref 65–100)
GLUCOSE BLD STRIP.AUTO-MCNC: 123 MG/DL (ref 65–100)
GLUCOSE BLD STRIP.AUTO-MCNC: 92 MG/DL (ref 65–100)
GLUCOSE SERPL-MCNC: 130 MG/DL (ref 65–100)
HCT VFR BLD AUTO: 33.6 % (ref 35–47)
HGB BLD-MCNC: 9.6 G/DL (ref 11.5–16)
IMM GRANULOCYTES # BLD AUTO: 0.1 K/UL (ref 0–0.04)
IMM GRANULOCYTES NFR BLD AUTO: 1 % (ref 0–0.5)
LYMPHOCYTES # BLD: 1.4 K/UL (ref 0.8–3.5)
LYMPHOCYTES NFR BLD: 13 % (ref 12–49)
MAGNESIUM SERPL-MCNC: 1.7 MG/DL (ref 1.6–2.4)
MCH RBC QN AUTO: 24.5 PG (ref 26–34)
MCHC RBC AUTO-ENTMCNC: 28.6 G/DL (ref 30–36.5)
MCV RBC AUTO: 85.7 FL (ref 80–99)
MONOCYTES # BLD: 1 K/UL (ref 0–1)
MONOCYTES NFR BLD: 9 % (ref 5–13)
NEUTS SEG # BLD: 7.6 K/UL (ref 1.8–8)
NEUTS SEG NFR BLD: 70 % (ref 32–75)
NRBC # BLD: 0 K/UL (ref 0–0.01)
NRBC BLD-RTO: 0 PER 100 WBC
PLATELET # BLD AUTO: 342 K/UL (ref 150–400)
PMV BLD AUTO: 9.9 FL (ref 8.9–12.9)
POTASSIUM SERPL-SCNC: 3.1 MMOL/L (ref 3.5–5.1)
PROT SERPL-MCNC: 6.2 G/DL (ref 6.4–8.2)
RBC # BLD AUTO: 3.92 M/UL (ref 3.8–5.2)
RBC MORPH BLD: ABNORMAL
SERVICE CMNT-IMP: ABNORMAL
SERVICE CMNT-IMP: NORMAL
SODIUM SERPL-SCNC: 138 MMOL/L (ref 136–145)
WBC # BLD AUTO: 10.9 K/UL (ref 3.6–11)

## 2019-05-21 PROCEDURE — 94760 N-INVAS EAR/PLS OXIMETRY 1: CPT

## 2019-05-21 PROCEDURE — 80053 COMPREHEN METABOLIC PANEL: CPT

## 2019-05-21 PROCEDURE — 97116 GAIT TRAINING THERAPY: CPT

## 2019-05-21 PROCEDURE — 74011000250 HC RX REV CODE- 250: Performed by: INTERNAL MEDICINE

## 2019-05-21 PROCEDURE — 83735 ASSAY OF MAGNESIUM: CPT

## 2019-05-21 PROCEDURE — 65270000029 HC RM PRIVATE

## 2019-05-21 PROCEDURE — 74011250637 HC RX REV CODE- 250/637: Performed by: INTERNAL MEDICINE

## 2019-05-21 PROCEDURE — 97530 THERAPEUTIC ACTIVITIES: CPT

## 2019-05-21 PROCEDURE — 82550 ASSAY OF CK (CPK): CPT

## 2019-05-21 PROCEDURE — 74011250637 HC RX REV CODE- 250/637: Performed by: FAMILY MEDICINE

## 2019-05-21 PROCEDURE — 74011250636 HC RX REV CODE- 250/636: Performed by: INTERNAL MEDICINE

## 2019-05-21 PROCEDURE — 82962 GLUCOSE BLOOD TEST: CPT

## 2019-05-21 PROCEDURE — 97535 SELF CARE MNGMENT TRAINING: CPT

## 2019-05-21 PROCEDURE — 36415 COLL VENOUS BLD VENIPUNCTURE: CPT

## 2019-05-21 PROCEDURE — 85025 COMPLETE CBC W/AUTO DIFF WBC: CPT

## 2019-05-21 RX ORDER — POTASSIUM CHLORIDE 750 MG/1
10 TABLET, FILM COATED, EXTENDED RELEASE ORAL 2 TIMES DAILY
Status: DISCONTINUED | OUTPATIENT
Start: 2019-05-21 | End: 2019-05-22

## 2019-05-21 RX ADMIN — HYDROCODONE BITARTRATE AND ACETAMINOPHEN 1 TABLET: 5; 325 TABLET ORAL at 08:26

## 2019-05-21 RX ADMIN — APIXABAN 5 MG: 5 TABLET, FILM COATED ORAL at 22:03

## 2019-05-21 RX ADMIN — Medication 10 ML: at 13:21

## 2019-05-21 RX ADMIN — TRAMADOL HYDROCHLORIDE 50 MG: 50 TABLET, FILM COATED ORAL at 10:16

## 2019-05-21 RX ADMIN — POTASSIUM CHLORIDE 10 MEQ: 750 TABLET, FILM COATED, EXTENDED RELEASE ORAL at 17:45

## 2019-05-21 RX ADMIN — Medication 1 CAPSULE: at 08:26

## 2019-05-21 RX ADMIN — MONTELUKAST SODIUM 10 MG: 10 TABLET, FILM COATED ORAL at 22:03

## 2019-05-21 RX ADMIN — METOPROLOL TARTRATE 12.5 MG: 25 TABLET ORAL at 08:26

## 2019-05-21 RX ADMIN — Medication 10 ML: at 06:00

## 2019-05-21 RX ADMIN — CLOPIDOGREL 75 MG: 75 TABLET, FILM COATED ORAL at 08:26

## 2019-05-21 RX ADMIN — APIXABAN 5 MG: 5 TABLET, FILM COATED ORAL at 08:26

## 2019-05-21 RX ADMIN — DAPTOMYCIN 600 MG: 500 INJECTION, POWDER, LYOPHILIZED, FOR SOLUTION INTRAVENOUS at 13:21

## 2019-05-21 RX ADMIN — MIRTAZAPINE 15 MG: 15 TABLET, FILM COATED ORAL at 22:15

## 2019-05-21 RX ADMIN — POTASSIUM CHLORIDE 10 MEQ: 750 TABLET, FILM COATED, EXTENDED RELEASE ORAL at 08:26

## 2019-05-21 RX ADMIN — VITAMIN D, TAB 1000IU (100/BT) 1000 UNITS: 25 TAB at 08:26

## 2019-05-21 RX ADMIN — TRAMADOL HYDROCHLORIDE 50 MG: 50 TABLET, FILM COATED ORAL at 17:45

## 2019-05-21 RX ADMIN — Medication 10 ML: at 22:15

## 2019-05-21 RX ADMIN — HYDROCODONE BITARTRATE AND ACETAMINOPHEN 1 TABLET: 5; 325 TABLET ORAL at 14:15

## 2019-05-21 RX ADMIN — METOPROLOL TARTRATE 12.5 MG: 25 TABLET ORAL at 22:01

## 2019-05-21 RX ADMIN — DULOXETINE HYDROCHLORIDE 60 MG: 30 CAPSULE, DELAYED RELEASE ORAL at 08:26

## 2019-05-21 NOTE — PROGRESS NOTES
Problem: Self Care Deficits Care Plan (Adult) Goal: *Acute Goals and Plan of Care (Insert Text) Description Occupational Therapy Goals Initiated 5/20/2019 1. Patient will perform grooming with modified independence within 7 day(s). 2.  Patient will perform bathing with minimal assistance/contact guard assist within 7 day(s). 3.  Patient will perform lower body dressing with minimal assistance/contact guard assist within 7 day(s). 4.  Patient will perform toilet transfers with modified independence within 7 day(s). 5.  Patient will perform all aspects of toileting with modified independence within 7 day(s). 6.  Patient will participate in upper extremity therapeutic exercise/activities with supervision/set-up for 10 minutes within 7 day(s). 7.  Patient will utilize energy conservation techniques during functional activities with verbal cues within 7 day(s). Outcome: Not Progressing Towards Goal 
  
OCCUPATIONAL THERAPY TREATMENT Patient: Temi Ballesteros (77 y.o. female) Date: 5/21/2019 Diagnosis: Cellulitis of right upper arm [L03.113] Cellulitis of right upper arm Precautions:   
Chart, occupational therapy assessment, plan of care, and goals were reviewed. ASSESSMENT: Patient declining OOB or other activity. Stated she needs help cutting up her meat, at this point recommended using rocker knife for more independence. Patient prefers having food cut up for her. Able to reach for items on tray with left hand but still having trouble with more fine motor tasks. Will defer to OT evaluation recommendation as patient has ADL deficits and is not currently motivated to do much activity. Progression toward goals: 
?       Improving appropriately and progressing toward goals ? Improving slowly and progressing toward goals ? Not making progress toward goals and plan of care will be adjusted PLAN: 
Patient continues to benefit from skilled intervention to address the above impairments. Continue treatment per established plan of care. Discharge Recommendations:  Brandon Banks Further Equipment Recommendations for Discharge:  may benefit from rocker knife for cuttign food SUBJECTIVE:  
Patient stated ?you can let them know I'm being lazy today and not getting up except to bathroom. ? OBJECTIVE DATA SUMMARY:  
Cognitive/Behavioral Status: 
Neurologic State: Alert ADL Intervention: 
Feeding Cutting Food: Compensatory technique training(educated on possibility of rocker knife; prefers setup) Drink to Mouth: Supervision Able to retrieve items on tray with L hand. Looked in mirror to see rash on her face, but not inclined to do further. Pain: 
Pain Scale 1: Visual 
Pain Intensity 1: 3 Activity Tolerance:  
Poor due to unwilling for OOB activity Please refer to the flowsheet for vital signs taken during this treatment. After treatment:  
? Patient left in no apparent distress sitting up in chair ? Patient left in no apparent distress in bed 
? Call bell left within reach ? Nursing notified ? Caregiver present ? Bed alarm activated COMMUNICATION/COLLABORATION:  
The patient?s plan of care was discussed with: Registered Nurse Kristy Fatima Time Calculation: 8 mins

## 2019-05-21 NOTE — CONSULTS
[address] Name:  Clark Wyatt 
MR#:  [id number] :  1945 ACCOUNT #:  [de-identified] DATE OF SERVICE:  2019 REASON FOR CONSULTATION:  Right arm infection. HISTORY OF PRESENT ILLNESS:  The patient is a 68-year-old female known to me from recent complex right arm thrombectomy, this was done on . We were able to open up circulation to the hand; however, this has resulted in partial finger necrosis of the middle three fingers. She has been evaluated by Orthopedics for partial finger amputation. She presents with 2-week history of problems with the incision, started draining pus. Over the last day, she said she has been placed on IV antibiotics. PAST MEDICAL HISTORY:  Extensive, consists of: 1. Coronary artery disease. 2.  Atrial fibrillation. 3.  Fibromyalgia. 4.  Diabetes. 5.  History of deep venous thrombosis. 6.  GERD 
7. Hypertension. 8.  Sleep apnea. MEDICATIONS:  Please see list. 
 
ALLERGIES:  EXTENSIVE, PLEASE SEE LIST. SOCIAL HISTORY:  Positive for prior tobacco.  No daily alcohol. REVIEW OF SYSTEMS:  The patient denies fever, chills, really only complaint is right arm pain. She has partial numbness of her fingers and pain in the necrotic portions. PHYSICAL EXAMINATION: 
VITAL SIGNS:  Her temperature is 98.3, heart rate 90, blood pressure is 128/61. She is 98% on room air. LUNGS:  Clear to auscultation bilaterally. HEART:  Regular. EXTREMITIES:  Her right arm shows that her hand is swollen and demarcating. She will require partial amputation of the middle three fingers to examine if pinky finger is salvageable. All the incisions look a little bit abnormal.  The incisions over the radial artery and ulnar artery appeared to be mostly intact, minimal separation. I cannot appreciate any purulent discharge from here.   The long antecubital incision has purulent discharge that appears from placing pressure on the distal portion. I have manually drained this the best I can by probing and placing manual pressure, appears to be no discharge from the proximal portion of the incision just some erythema. LABORATORY VALUES:  Reviewed. IMPRESSION AND PLAN:  Purulent infection of the antecubital incision. I have tried to drain this at the bedside and I am hopeful that she will improve with IV antibiotics. She has poor wound healing. I am concerned that if we opened up the incision, she may have a more difficulty to heal the wound. It appears that her prednisone has been held which I think is good. For right now, I have continued the Eliquis and if this does not improve over the next day or so, she may need to have the wound opened up, washed out and closed loosely. I have discussed with her and she understands. Thank you for the consultation. We will follow along. Charmayne Hands, MD AM/[facility]_I/V_TPDJA_P 
D:  05/20/2019 16:22 
T:  05/20/2019 20:22 
JOB #:  [id number] CC:  Lilibeth Obando MD

## 2019-05-21 NOTE — PROGRESS NOTES
[facility] Progress Note Oneil Shipman DO 
  [phone] Office [phone]  Fax 2019 Assessment & Plan: 1. Right upper extremity cellulitis, status post recent thrombectomy involving the brachial, radial and ulnar arteries with wrist exploration on 2019. Cultures are growing MRSA. Possible abscess seen on CT scan. Await further input from Vascular Surgery. Blood cultures remain sterile to date. Further recommendations pending results of above. 2. Right hand dry gangrene involving three fingers. This does not appear to be clinically infected. Continue local wound care. No antibiotic treatment planned. 3. MULTIPLE ANTIBIOTIC ALLERGIES INCLUDING SULFA, TETRACYCLINE AND CIPROFLOXACIN. 4. Malar rash. Question drug-induced subacute cutaneous lupus erythematosus? Improving. Abx classes changed Subjective:  
 
Arm more painful today Objective:  
 
Vitals:  
Visit Vitals /64 (BP 1 Location: Left arm, BP Patient Position: At rest) Pulse 90 Temp 97.5 °F (36.4 °C) Resp 16 Wt 105.7 kg (233 lb) SpO2 96% BMI 36.49 kg/m² Tmax:  Temp (24hrs), Av °F (36.7 °C), Min:97.5 °F (36.4 °C), Max:98.4 °F (36.9 °C) Exam:  
Patient is intubated:  no 
 
Physical Examination:  
General:  Alert, cooperative, no distress Head:  Normocephalic, atraumatic. Eyes:  Conjunctivae clear Neck: Supple Lungs:   No distress. Clear to auscultation bilaterally. Chest wall:    
Heart:  Regular rate and rhythm, S1, S2 normal, no murmur Abdomen:   Soft, non-tender, non-distended Extremities: RUE dressed Skin: Malar rash fading Neurologic: CNII-XII intact. Labs:     
 
No lab exists for component: ITNL Recent Labs  
  19 
1260 CPK 18* Recent Labs  
  19 
0613 19 
0450 19 
0500  139 140  
K 3.1* 3.3* 3.5  109* 114* CO2 23 22 20* BUN 8 12 16 CREA 0.86 0.95 0.91  
 * 124* 112* MG 1.7  --   --   
ALB 2.3* 2.3*  --   
WBC 10.9 11.1* 12.0* HGB 9.6* 9.5* 9.3* HCT 33.6* 33.7* 32.5*  
 328 315 No results for input(s): INR, PTP, APTT in the last 72 hours. No lab exists for component: INREXT Needs: urine analysis, urine sodium, protein and creatinine No results found for: Felisa Cantu Cultures: No results found for: CANDIS Lab Results Component Value Date/Time Culture result: (A) 05/17/2019 06:34 PM  
  LIGHT **METHICILLIN RESISTANT STAPHYLOCOCCUS AUREUS**  
 Culture result:  05/17/2019 06:34 PM  
  CALLED TO AND READ BACK BY 
BLADIMIR Barney AT 14:40 ON 5/18/19 BY [facility]. Culture result: NO GROWTH 4 DAYS 05/17/2019 06:34 PM  
 
 
Radiology:  
 
Medications Current Facility-Administered Medications Medication Dose Route Frequency Last Dose  potassium chloride SR (KLOR-CON 10) tablet 10 mEq  10 mEq Oral BID 10 mEq at 05/21/19 4378  DAPTOmycin (CUBICIN) 600 mg in sterile water (preservative free) 12 mL IV syringe RF formulation  600 mg IntraVENous Q24H 600 mg at 05/20/19 1359  metoprolol tartrate (LOPRESSOR) tablet 12.5 mg  12.5 mg Oral Q12H 12.5 mg at 05/21/19 0826  
 morphine injection 1 mg  1 mg IntraVENous Q4H PRN 1 mg at 05/18/19 0457  
 HYDROcodone-acetaminophen (NORCO) 5-325 mg per tablet 1 Tab  1 Tab Oral Q6H PRN 1 Tab at 05/21/19 0826  
 sodium chloride (NS) flush 5-40 mL  5-40 mL IntraVENous Q8H 10 mL at 05/21/19 0600  
 sodium chloride (NS) flush 5-40 mL  5-40 mL IntraVENous PRN    
 acetaminophen (TYLENOL) tablet 650 mg  650 mg Oral Q4H  mg at 05/18/19 0223  
 naloxone (NARCAN) injection 0.4 mg  0.4 mg IntraVENous PRN    
 ondansetron (ZOFRAN) injection 4 mg  4 mg IntraVENous Q4H PRN    
 sodium chloride (NS) flush 5-10 mL  5-10 mL IntraVENous PRN    
 insulin lispro (HUMALOG) injection   SubCUTAneous AC&HS Stopped at 05/17/19 2200  
 glucose chewable tablet 16 g  4 Tab Oral PRN    
  dextrose (D50W) injection syrg 12.5-25 g  12.5-25 g IntraVENous PRN    
 glucagon (GLUCAGEN) injection 1 mg  1 mg IntraMUSCular PRN    
 apixaban (ELIQUIS) tablet 5 mg  5 mg Oral BID 5 mg at 05/21/19 3091  cholecalciferol (VITAMIN D3) tablet 1,000 Units  1,000 Units Oral DAILY 1,000 Units at 05/21/19 4016  clopidogrel (PLAVIX) tablet 75 mg  75 mg Oral DAILY 75 mg at 05/21/19 4751  DULoxetine (CYMBALTA) capsule 60 mg  60 mg Oral DAILY 60 mg at 05/21/19 0826  
 [facility] ac& pc-s.therm-b.anim (NUBIA Q/RISAQUAD)  1 Cap Oral DAILY 1 Cap at 05/21/19 0826  
 mirtazapine (REMERON) tablet 15 mg  15 mg Oral QHS 15 mg at 05/20/19 2321  
 montelukast (SINGULAIR) tablet 10 mg  10 mg Oral QHS 10 mg at 05/20/19 2200  pantoprazole (PROTONIX) tablet 20 mg  (Patient Supplied)  20 mg Oral ACD  sAXagliptin (ONGLYZA) tablet 5 mg  (Patient Supplied)  5 mg Oral DAILY  traMADol (ULTRAM) tablet 50 mg  50 mg Oral Q8H PRN 50 mg at 05/21/19 1016 Case discussed with: 
 
 
David Garvin DO

## 2019-05-21 NOTE — PROGRESS NOTES
Vascular Surgery --I can't yield any more pus from pressure; re-dressed arm 
--I think is getting better with abx 
--will recheck tomorrow

## 2019-05-21 NOTE — PROGRESS NOTES
Daily Progress Note: 5/21/2019 Arleen Montes MD 
 
Assessment/Plan:  
Cellulitis of right upper arm (5/17/2019): infected and purulent recent surgical wounds. --Started IV cefepime and Vancomycin - changed to Daptomycin 5/20 per ID.  
--Pain control. Wound care consulted --Blood culture no growth to date 
--Wound culture +MRSA --Vascular Surg and ID consulted 
  
Severe sepsis due to cellulitis POA: she has SIRS criteria with an elevated lactate in the setting of cellulitis. --lactic acid normal 
  
Type II diabetes mellitus ([facility] (10/9/2015) / Type 2 diabetes with nephropathy ([facility] (10/1/2018): 
--A1c at 7.7% BS controlled 
  
Hypokalemia:replace as needed 
  
Coronary artery disease involving native coronary artery without angina pectoris (1/22/2016):  
--stable 
  
Essential hypertension (1/22/2016): BP stable. 
  
Recurrent deep vein thrombosis (DVT) ([facility] (3/30/2018) / Chronic anticoagulation (3/30/2018):  
--resumed Eliquis 
  
Gangrene of finger of right hand ([facility] (5/17/2019): involving right index, mid and ring fingers. --ortho has seen, no acute need for surgery at this time - will need partial amputation at some point 
  
Obesity (BMI 30-39.9) (11/13/2018): nutritional consult 
  
Atrial fibrillation ([facility] (11/16/2018):  
--HR up without bblocker, will re-start metoprolol at low dose - watch for hypotension 
  
Mild intermittent asthma (5/17/2019): not wheezing. Nebs as needed DVT proph - on eliquis Problem List: 
Problem List as of 5/21/2019 Date Reviewed: 4/26/2019 Codes Class Noted - Resolved * (Principal) Cellulitis of right upper arm ICD-10-CM: L03.113 ICD-9-CM: 682.3  5/17/2019 - Present Mild intermittent asthma ICD-10-CM: J45.20 ICD-9-CM: 493.90  5/17/2019 - Present Gangrene of finger of right hand [facility]) ICD-10-CM: R04 
ICD-9-CM: 785.4  5/17/2019 - Present Brachial artery thrombus (HCC) ICD-10-CM: N27.6 ICD-9-CM: 444.21  4/26/2019 - Present Bowel obstruction ([facility] ICD-10-CM: A46.064 ICD-9-CM: 560.9  1/8/2019 - Present Partial small bowel obstruction ([facility] ICD-10-CM: K56.600 ICD-9-CM: 560.9  1/5/2019 - Present Sleep apnea ICD-10-CM: G47.30 ICD-9-CM: 780.57  1/5/2019 - Present Overview Signed 1/5/2019  8:41 PM by Gopi Henao DO  
  wears CPAP Atrial fibrillation [facility]) ICD-10-CM: I48.91 
ICD-9-CM: 427.31  11/16/2018 - Present Dyspnea ICD-10-CM: R06.00 
ICD-9-CM: 786.09  11/13/2018 - Present ARF (acute renal failure) (HCC) ICD-10-CM: N17.9 ICD-9-CM: 584.9  11/13/2018 - Present Obesity (BMI 30-39.9) (Chronic) ICD-10-CM: A95.2 ICD-9-CM: 278.00  11/13/2018 - Present Closed wedge compression fracture of T7 vertebra ([facility] ICD-10-CM: T70.886R ICD-9-CM: 805.2  11/13/2018 - Present Type 2 diabetes with nephropathy ([facility] ICD-10-CM: E11.21 
ICD-9-CM: 250.40, 583.81  10/1/2018 - Present Chest pain ICD-10-CM: R07.9 ICD-9-CM: 786.50  6/27/2018 - Present Generalized abdominal pain ICD-10-CM: R10.84 ICD-9-CM: 789.07  6/27/2018 - Present Recurrent deep vein thrombosis (DVT) (HCC) ICD-10-CM: I82.409 ICD-9-CM: 453.40  3/30/2018 - Present Chronic anticoagulation (Chronic) ICD-10-CM: Z79.01 
ICD-9-CM: V58.61  3/30/2018 - Present Coronary artery disease involving native coronary artery without angina pectoris (Chronic) ICD-10-CM: I25.10 ICD-9-CM: 414.01  1/22/2016 - Present Essential hypertension (Chronic) ICD-10-CM: I10 
ICD-9-CM: 401.9  1/22/2016 - Present Type II diabetes mellitus (HCC) (Chronic) ICD-10-CM: E11.9 ICD-9-CM: 250.00  10/9/2015 - Present NSTEMI (non-ST elevated myocardial infarction) ([facility] ICD-10-CM: I21.4 ICD-9-CM: 410.70  10/9/2015 - Present RESOLVED: CAD (coronary artery disease) ICD-10-CM: I25.10 ICD-9-CM: 414.00  10/9/2015 - 1/22/2016 RESOLVED: HTN (hypertension) ICD-10-CM: I10 
ICD-9-CM: 401.9  10/9/2015 - 2016 Subjective:  
H&P: Ms. Krysten Grant is a 72 y.o. female who is being admitted for cellulitis of right upper arm. Ms. Krysten Grant presented to our Emergency Department today complaining of a progressive swelling, redness and discharge from surgery wounds following a thrombectomy of right arm including brachial, radial and ulnar arteries with wrist exploration done recently. She denies any fever or chills. Pain seems worse with movement but stable when arm is rested. She also has gangrene three finger right hand for which she was to have amputations next week. She will be admitted for further management. : No acute events overnight. Slept with CPAP. She has pain but it is currently controlled. Denies CP, SOB. 
 
: No acute events overnight. Pain now controlled this AM.  Denies CP, SOB, dizziness. :  No new complaints. Little pain currently. Continues on Vanc/Maxipime. ID and Vasc surg consulted. :  C/O pain right arm/hand. Bedside I&D of antecubital site done by Vascular Surg yesterday with purulent DC obtained and may need washout. Wound cultures with MRSA. Blood cultures remain neg. ID changed antibiotic to Daptomycin. K+ low - replacing. CT right UE results pending. Review of Systems: A comprehensive review of systems was negative except for that written in the HPI. Objective:  
Physical Exam:  
 
Visit Vitals /60 (BP 1 Location: Left arm, BP Patient Position: At rest;Head of bed elevated (Comment degrees)) Pulse 92 Temp 98.4 °F (36.9 °C) Resp 16 Wt 105.7 kg (233 lb) SpO2 96% BMI 36.49 kg/m² O2 Device: Room air Temp (24hrs), Av.1 °F (36.7 °C), Min:97.9 °F (36.6 °C), Max:98.4 °F (36.9 °C) No intake/output data recorded. No intake/output data recorded. General:  Alert, cooperative, no distress, appears stated age, obese Head:  Normocephalic, without obvious abnormality, atraumatic. Eyes:  Conjunctivae/corneas clear. PERRL, EOMs intact. Nose: Nares normal. Septum midline. Mucosa normal. No drainage or sinus tenderness. Throat: Lips, mucosa, and tongue moist.. Neck: Supple, symmetrical, trachea midline, no adenopathy. Lungs:   Clear to auscultation bilaterally. Heart:  Irregularly irregular, S1, S2 normal, no murmur, click, rub or gallop. Abdomen:   Soft, non-tender. Bowel sounds normal. No masses,  No organomegaly. Extremities: LE trace edema. Dry gangre fingers right hand with further demarkation. Skin: Right upper arm incision dressed and with DC on dressing. Neurologic: CNII-XII intact. Alert and oriented X 3. Data Review:  
   
Recent Days: 
Recent Labs  
  05/21/19 
0613 05/20/19 
0450 05/19/19 
0500 WBC 10.9 11.1* 12.0* HGB 9.6* 9.5* 9.3* HCT 33.6* 33.7* 32.5*  
 328 315 Recent Labs  
  05/21/19 
0613 05/20/19 
0450 05/19/19 
0500  139 140  
K 3.1* 3.3* 3.5  109* 114* CO2 23 22 20* * 124* 112* BUN 8 12 16 CREA 0.86 0.95 0.91  
CA 9.1 8.9 8.1*  
MG 1.7  --   --   
ALB 2.3* 2.3*  --   
SGOT 21 27  --   
ALT 27 31  -- No results for input(s): PH, PCO2, PO2, HCO3, FIO2 in the last 72 hours. 24 Hour Results: 
Recent Results (from the past 24 hour(s)) GLUCOSE, POC Collection Time: 05/20/19 11:30 AM  
Result Value Ref Range Glucose (POC) 120 (H) 65 - 100 mg/dL Performed by Porter Given (PCT) GLUCOSE, POC Collection Time: 05/20/19  5:20 PM  
Result Value Ref Range Glucose (POC) 109 (H) 65 - 100 mg/dL Performed by Porter Given (PCT) GLUCOSE, POC Collection Time: 05/20/19  9:17 PM  
Result Value Ref Range Glucose (POC) 137 (H) 65 - 100 mg/dL Performed by Claudette Gunner (PCT) METABOLIC PANEL, COMPREHENSIVE Collection Time: 05/21/19  6:13 AM  
Result Value Ref Range  Sodium 138 136 - 145 mmol/L  
 Potassium 3.1 (L) 3.5 - 5.1 mmol/L Chloride 107 97 - 108 mmol/L  
 CO2 23 21 - 32 mmol/L Anion gap 8 5 - 15 mmol/L Glucose 130 (H) 65 - 100 mg/dL BUN 8 6 - 20 MG/DL Creatinine 0.86 0.55 - 1.02 MG/DL  
 BUN/Creatinine ratio 9 (L) 12 - 20 GFR est AA >60 >60 ml/min/1.73m2 GFR est non-AA >60 >60 ml/min/1.73m2 Calcium 9.1 8.5 - 10.1 MG/DL Bilirubin, total 0.3 0.2 - 1.0 MG/DL  
 ALT (SGPT) 27 12 - 78 U/L  
 AST (SGOT) 21 15 - 37 U/L Alk. phosphatase 198 (H) 45 - 117 U/L Protein, total 6.2 (L) 6.4 - 8.2 g/dL Albumin 2.3 (L) 3.5 - 5.0 g/dL Globulin 3.9 2.0 - 4.0 g/dL A-G Ratio 0.6 (L) 1.1 - 2.2 MAGNESIUM Collection Time: 05/21/19  6:13 AM  
Result Value Ref Range Magnesium 1.7 1.6 - 2.4 mg/dL CBC WITH AUTOMATED DIFF Collection Time: 05/21/19  6:13 AM  
Result Value Ref Range WBC 10.9 3.6 - 11.0 K/uL  
 RBC 3.92 3.80 - 5.20 M/uL HGB 9.6 (L) 11.5 - 16.0 g/dL HCT 33.6 (L) 35.0 - 47.0 % MCV 85.7 80.0 - 99.0 FL  
 MCH 24.5 (L) 26.0 - 34.0 PG  
 MCHC 28.6 (L) 30.0 - 36.5 g/dL  
 RDW 16.6 (H) 11.5 - 14.5 % PLATELET 179 973 - 856 K/uL MPV 9.9 8.9 - 12.9 FL  
 NRBC 0.0 0  WBC ABSOLUTE NRBC 0.00 0.00 - 0.01 K/uL NEUTROPHILS PENDING % LYMPHOCYTES PENDING % MONOCYTES PENDING % EOSINOPHILS PENDING % BASOPHILS PENDING % IMMATURE GRANULOCYTES PENDING %  
 ABS. NEUTROPHILS PENDING K/UL  
 ABS. LYMPHOCYTES PENDING K/UL  
 ABS. MONOCYTES PENDING K/UL  
 ABS. EOSINOPHILS PENDING K/UL  
 ABS. BASOPHILS PENDING K/UL  
 ABS. IMM. GRANS. PENDING K/UL  
 DF PENDING   
CK Collection Time: 05/21/19  6:13 AM  
Result Value Ref Range CK 18 (L) 26 - 192 U/L  
GLUCOSE, POC Collection Time: 05/21/19  6:57 AM  
Result Value Ref Range Glucose (POC) 123 (H) 65 - 100 mg/dL Performed by Ezra Osuna (PCT) Medications reviewed Current Facility-Administered Medications Medication Dose Route Frequency  DAPTOmycin (CUBICIN) 600 mg in sterile water (preservative free) 12 mL IV syringe RF formulation  600 mg IntraVENous Q24H  
 metoprolol tartrate (LOPRESSOR) tablet 12.5 mg  12.5 mg Oral Q12H  
 morphine injection 1 mg  1 mg IntraVENous Q4H PRN  
 HYDROcodone-acetaminophen (NORCO) 5-325 mg per tablet 1 Tab  1 Tab Oral Q6H PRN  
 sodium chloride (NS) flush 5-40 mL  5-40 mL IntraVENous Q8H  
 sodium chloride (NS) flush 5-40 mL  5-40 mL IntraVENous PRN  
 acetaminophen (TYLENOL) tablet 650 mg  650 mg Oral Q4H PRN  
 naloxone (NARCAN) injection 0.4 mg  0.4 mg IntraVENous PRN  
 ondansetron (ZOFRAN) injection 4 mg  4 mg IntraVENous Q4H PRN  
 sodium chloride (NS) flush 5-10 mL  5-10 mL IntraVENous PRN  
 insulin lispro (HUMALOG) injection   SubCUTAneous AC&HS  
 glucose chewable tablet 16 g  4 Tab Oral PRN  
 dextrose (D50W) injection syrg 12.5-25 g  12.5-25 g IntraVENous PRN  
 glucagon (GLUCAGEN) injection 1 mg  1 mg IntraMUSCular PRN  
 apixaban (ELIQUIS) tablet 5 mg  5 mg Oral BID  cholecalciferol (VITAMIN D3) tablet 1,000 Units  1,000 Units Oral DAILY  clopidogrel (PLAVIX) tablet 75 mg  75 mg Oral DAILY  DULoxetine (CYMBALTA) capsule 60 mg  60 mg Oral DAILY  lactobac ac& pc-s.therm-b.anim (NUBIA Q/RISAQUAD)  1 Cap Oral DAILY  mirtazapine (REMERON) tablet 15 mg  15 mg Oral QHS  montelukast (SINGULAIR) tablet 10 mg  10 mg Oral QHS  pantoprazole (PROTONIX) tablet 20 mg  (Patient Supplied)  20 mg Oral ACD  sAXagliptin (ONGLYZA) tablet 5 mg  (Patient Supplied)  5 mg Oral DAILY  traMADol (ULTRAM) tablet 50 mg  50 mg Oral Q8H PRN Care Plan discussed with: Patient/nurse/ID Total time spent with patient: 30 minutes.  
 
Misty Omer MD

## 2019-05-21 NOTE — PROGRESS NOTES
Problem: Mobility Impaired (Adult and Pediatric) Goal: *Acute Goals and Plan of Care (Insert Text) Description Physical Therapy Goals Initiated 5/20/2019 1. Patient will move from supine to sit and sit to supine  in bed with modified independence within 7 day(s). 2.  Patient will transfer from bed to chair and chair to bed with modified independence using the least restrictive device within 7 day(s). 3.  Patient will perform sit to stand with modified independence within 7 day(s). 4.  Patient will ambulate with modified independence for 100 feet with the least restrictive device within 7 day(s). Outcome: Progressing Towards Goal 
 PHYSICAL THERAPY TREATMENT Patient: Susan Galeas (89 y.o. female) Date: 5/21/2019 Diagnosis: Cellulitis of right upper arm [L03.113] Cellulitis of right upper arm Precautions:   
Chart, physical therapy assessment, plan of care and goals were reviewed. ASSESSMENT: 
Based on the objective data described below, the patient presents with Contact guard assistance level overall for transfers. Gait training completed at Contact guard assistance, 20 feet x 2 and using a gait belt only to the bathroom and rollator back d/t severe wheezing and SOB despite O2 saturations 98% and greater. Pt reports it is near her baseline level of breathing. Deferred further mobility d/t constant incontinence of bowels. At this time feel pt would be best suited for skilled rehab at discharge given poor endurance, weakness and impaired balance increasing pt's fall risk and limiting independence. The following are barriers to independence while in acute care:  
-Cognitive and/or behavioral: volition 
-Medical condition: strength, functional endurance and standing balance   
-Other:    
 
Prior level of function: Short distance mobility with rollator.    
 
PLAN: 
Patient continues to benefit from skilled intervention to address the above impairments. Continue treatment per established plan of care. Recommend with staff: Up to chair for meals and toileting with supervision Recommend next PT session: Progression of gait Discharge recommendations: Rehab at skilled nursing facility (SNF) (to regain functional baseline patient requires rehab) If above is not an option then recommend: Home health (to increase independence and safety) 24 supervision Patient's barriers to discharging home, in addition to above impairments: family availability to assist 
entry and exit into the home. Equipment recommendations for successful discharge (if) home: None at this time SUBJECTIVE:  
Patient stated ? I am always like this. ? OBJECTIVE DATA SUMMARY:  
Critical Behavior: 
Neurologic State: Alert, Appropriate for age Orientation Level: Oriented X4 Cognition: Appropriate decision making, Appropriate for age attention/concentration, Appropriate safety awareness, Follows commands Safety/Judgement: Awareness of environment Functional Mobility Training: 
Bed Mobility: 
  
Supine to Sit: Supervision Sit to Supine: Supervision Transfers: 
Sit to Stand: Contact guard assistance Stand to Sit: Contact guard assistance Balance: 
Sitting: Intact Standing: Impaired Standing - Static: Good Standing - Dynamic : Fair Ambulation/Gait Training: 
Distance (ft): 20 Feet (ft)(20ft x 2 with rest on commode) Assistive Device: Gait belt;Walker, rollator Ambulation - Level of Assistance: Contact guard assistance Gait Abnormalities: Decreased step clearance; Altered arm swing Base of Support: Widened Speed/Es: Pace decreased (<100 feet/min) Activity Tolerance:  
Fair, requires rest breaks and observed SOB with activity Please refer to the flowsheet for vital signs taken during this treatment. After treatment patient left:  
Supine in bed Bed/Chair-wheels locked Call light within reach RN notified COMMUNICATION/COLLABORATION:  
The patient?s plan of care was discussed with: Registered Nurse Arabella Abdul, PT Time Calculation: 30 mins

## 2019-05-22 LAB
ALBUMIN SERPL-MCNC: 2.2 G/DL (ref 3.5–5)
ALBUMIN/GLOB SERPL: 0.6 {RATIO} (ref 1.1–2.2)
ALP SERPL-CCNC: 179 U/L (ref 45–117)
ALT SERPL-CCNC: 25 U/L (ref 12–78)
ANION GAP SERPL CALC-SCNC: 7 MMOL/L (ref 5–15)
AST SERPL-CCNC: 23 U/L (ref 15–37)
BASOPHILS # BLD: 0 K/UL (ref 0–0.1)
BASOPHILS NFR BLD: 0 % (ref 0–1)
BILIRUB SERPL-MCNC: 0.2 MG/DL (ref 0.2–1)
BUN SERPL-MCNC: 7 MG/DL (ref 6–20)
BUN/CREAT SERPL: 8 (ref 12–20)
CALCIUM SERPL-MCNC: 9 MG/DL (ref 8.5–10.1)
CHLORIDE SERPL-SCNC: 108 MMOL/L (ref 97–108)
CO2 SERPL-SCNC: 23 MMOL/L (ref 21–32)
CREAT SERPL-MCNC: 0.83 MG/DL (ref 0.55–1.02)
DIFFERENTIAL METHOD BLD: ABNORMAL
EOSINOPHIL # BLD: 0.8 K/UL (ref 0–0.4)
EOSINOPHIL NFR BLD: 9 % (ref 0–7)
ERYTHROCYTE [DISTWIDTH] IN BLOOD BY AUTOMATED COUNT: 16.7 % (ref 11.5–14.5)
GLOBULIN SER CALC-MCNC: 3.7 G/DL (ref 2–4)
GLUCOSE BLD STRIP.AUTO-MCNC: 103 MG/DL (ref 65–100)
GLUCOSE BLD STRIP.AUTO-MCNC: 104 MG/DL (ref 65–100)
GLUCOSE BLD STRIP.AUTO-MCNC: 113 MG/DL (ref 65–100)
GLUCOSE BLD STRIP.AUTO-MCNC: 116 MG/DL (ref 65–100)
GLUCOSE SERPL-MCNC: 109 MG/DL (ref 65–100)
HCT VFR BLD AUTO: 33.5 % (ref 35–47)
HGB BLD-MCNC: 9.5 G/DL (ref 11.5–16)
IMM GRANULOCYTES # BLD AUTO: 0.1 K/UL (ref 0–0.04)
IMM GRANULOCYTES NFR BLD AUTO: 1 % (ref 0–0.5)
LYMPHOCYTES # BLD: 1.7 K/UL (ref 0.8–3.5)
LYMPHOCYTES NFR BLD: 19 % (ref 12–49)
MCH RBC QN AUTO: 24.7 PG (ref 26–34)
MCHC RBC AUTO-ENTMCNC: 28.4 G/DL (ref 30–36.5)
MCV RBC AUTO: 87.2 FL (ref 80–99)
MONOCYTES # BLD: 1.1 K/UL (ref 0–1)
MONOCYTES NFR BLD: 12 % (ref 5–13)
NEUTS SEG # BLD: 5.2 K/UL (ref 1.8–8)
NEUTS SEG NFR BLD: 59 % (ref 32–75)
NRBC # BLD: 0.02 K/UL (ref 0–0.01)
NRBC BLD-RTO: 0.2 PER 100 WBC
PLATELET # BLD AUTO: 307 K/UL (ref 150–400)
PMV BLD AUTO: 10 FL (ref 8.9–12.9)
POTASSIUM SERPL-SCNC: 3.2 MMOL/L (ref 3.5–5.1)
PROT SERPL-MCNC: 5.9 G/DL (ref 6.4–8.2)
RBC # BLD AUTO: 3.84 M/UL (ref 3.8–5.2)
RBC MORPH BLD: ABNORMAL
SERVICE CMNT-IMP: ABNORMAL
SODIUM SERPL-SCNC: 138 MMOL/L (ref 136–145)
WBC # BLD AUTO: 8.9 K/UL (ref 3.6–11)

## 2019-05-22 PROCEDURE — 74011250636 HC RX REV CODE- 250/636: Performed by: INTERNAL MEDICINE

## 2019-05-22 PROCEDURE — 87045 FECES CULTURE AEROBIC BACT: CPT

## 2019-05-22 PROCEDURE — 36415 COLL VENOUS BLD VENIPUNCTURE: CPT

## 2019-05-22 PROCEDURE — 82962 GLUCOSE BLOOD TEST: CPT

## 2019-05-22 PROCEDURE — 65270000029 HC RM PRIVATE

## 2019-05-22 PROCEDURE — 85025 COMPLETE CBC W/AUTO DIFF WBC: CPT

## 2019-05-22 PROCEDURE — 74011250637 HC RX REV CODE- 250/637: Performed by: FAMILY MEDICINE

## 2019-05-22 PROCEDURE — 74011000250 HC RX REV CODE- 250: Performed by: INTERNAL MEDICINE

## 2019-05-22 PROCEDURE — 74011250637 HC RX REV CODE- 250/637: Performed by: INTERNAL MEDICINE

## 2019-05-22 PROCEDURE — 94760 N-INVAS EAR/PLS OXIMETRY 1: CPT

## 2019-05-22 PROCEDURE — 80053 COMPREHEN METABOLIC PANEL: CPT

## 2019-05-22 PROCEDURE — 97535 SELF CARE MNGMENT TRAINING: CPT

## 2019-05-22 RX ORDER — PANTOPRAZOLE SODIUM 40 MG/1
40 TABLET, DELAYED RELEASE ORAL
Status: DISCONTINUED | OUTPATIENT
Start: 2019-05-22 | End: 2019-05-24 | Stop reason: HOSPADM

## 2019-05-22 RX ORDER — POTASSIUM CHLORIDE 750 MG/1
20 TABLET, FILM COATED, EXTENDED RELEASE ORAL 2 TIMES DAILY
Status: DISCONTINUED | OUTPATIENT
Start: 2019-05-22 | End: 2019-05-24

## 2019-05-22 RX ADMIN — HYDROCODONE BITARTRATE AND ACETAMINOPHEN 1 TABLET: 5; 325 TABLET ORAL at 12:22

## 2019-05-22 RX ADMIN — VITAMIN D, TAB 1000IU (100/BT) 1000 UNITS: 25 TAB at 08:13

## 2019-05-22 RX ADMIN — HYDROCODONE BITARTRATE AND ACETAMINOPHEN 1 TABLET: 5; 325 TABLET ORAL at 19:05

## 2019-05-22 RX ADMIN — TRAMADOL HYDROCHLORIDE 50 MG: 50 TABLET, FILM COATED ORAL at 06:56

## 2019-05-22 RX ADMIN — ONDANSETRON 4 MG: 2 INJECTION INTRAMUSCULAR; INTRAVENOUS at 07:45

## 2019-05-22 RX ADMIN — Medication 10 ML: at 21:48

## 2019-05-22 RX ADMIN — TRAMADOL HYDROCHLORIDE 50 MG: 50 TABLET, FILM COATED ORAL at 14:54

## 2019-05-22 RX ADMIN — Medication 1 CAPSULE: at 08:13

## 2019-05-22 RX ADMIN — METOPROLOL TARTRATE 12.5 MG: 25 TABLET ORAL at 08:13

## 2019-05-22 RX ADMIN — METOPROLOL TARTRATE 12.5 MG: 25 TABLET ORAL at 21:43

## 2019-05-22 RX ADMIN — APIXABAN 5 MG: 5 TABLET, FILM COATED ORAL at 21:43

## 2019-05-22 RX ADMIN — POTASSIUM CHLORIDE 20 MEQ: 750 TABLET, FILM COATED, EXTENDED RELEASE ORAL at 19:05

## 2019-05-22 RX ADMIN — CLOPIDOGREL 75 MG: 75 TABLET, FILM COATED ORAL at 08:13

## 2019-05-22 RX ADMIN — MIRTAZAPINE 15 MG: 15 TABLET, FILM COATED ORAL at 21:44

## 2019-05-22 RX ADMIN — APIXABAN 5 MG: 5 TABLET, FILM COATED ORAL at 08:14

## 2019-05-22 RX ADMIN — Medication 10 ML: at 14:57

## 2019-05-22 RX ADMIN — DULOXETINE HYDROCHLORIDE 60 MG: 30 CAPSULE, DELAYED RELEASE ORAL at 08:13

## 2019-05-22 RX ADMIN — MONTELUKAST SODIUM 10 MG: 10 TABLET, FILM COATED ORAL at 21:44

## 2019-05-22 RX ADMIN — DAPTOMYCIN 600 MG: 500 INJECTION, POWDER, LYOPHILIZED, FOR SOLUTION INTRAVENOUS at 19:05

## 2019-05-22 RX ADMIN — PANTOPRAZOLE SODIUM 40 MG: 40 TABLET, DELAYED RELEASE ORAL at 19:05

## 2019-05-22 RX ADMIN — ONDANSETRON 4 MG: 2 INJECTION INTRAMUSCULAR; INTRAVENOUS at 14:54

## 2019-05-22 RX ADMIN — POTASSIUM CHLORIDE 20 MEQ: 750 TABLET, FILM COATED, EXTENDED RELEASE ORAL at 08:13

## 2019-05-22 RX ADMIN — Medication 10 ML: at 06:17

## 2019-05-22 NOTE — PROGRESS NOTES
Nutrition Assessment: 
 
RECOMMENDATIONS/INTERVENTION(S):  
Continue CCD diet Monitor PO intakes, weight, BG Add Glucerna BID for wound healing Please document PO intakes ASSESSMENT:  
5/22: 72 yr old female admitted for swelling and discharge from wound. PMHx: DM, HTN, CAD, DVT. Pt screened for LOS. No documentation >75% most of the time. Pt BG mostly controlled currently. A1C 7.7. , 130, 124, 112, 142 mg/dL. Last BM yesterday. Noted weight 233 lbs from 245 lbs 1 week ago and 260 lbs 3 weeks ago with large weight increase from Jan to April of 40 lbs. Current edema is 1+. Weight change may be fluid related, monitor weight and edema for trends. Usual weight is ~250 lbs. Please reweigh daily. Labs reviewed. No n/v, c/d.  
 
SUBJECTIVE/OBJECTIVE:  
Diet Order: Consistent carb 
% Eaten:  No data found. Pertinent Medications: [x] Reviewed Labs reviewed:  [x] Anthropometrics: Height: 5' 7\" (170.2 cm) Weight: 105.7 kg (233 lb) IBW (%IBW):   ( ) UBW (%UBW):   (  %) BMI: Body mass index is 36.49 kg/m². This BMI is indicative of: 
 
 [] Underweight    [] Normal    [] Overweight    [x]  Obesity    []  Extreme Obesity (BMI>40) Estimated Nutrition Needs (Based on): 2086 Kcals/day , 85 g(-106g/day(0.8-1.0g/kg)) Protein Carbohydrate: At Least 130 g/day  Fluids: 2100 mL/day (1mL/kg rounded to 50 mL) Last BM: 5/21-loose   []Active     []Hyperactive  []Hypoactive       [] Absent   BS Skin:    [] Intact   [x] Incision  [x] Breakdown   [] DTI   [] Tears/Excoriation/Abrasion  []Edema [] Other: Wt Readings from Last 30 Encounters:  
05/20/19 105.7 kg (233 lb) 05/13/19 111.1 kg (245 lb) 04/27/19 118 kg (260 lb 3.2 oz)  
03/25/19 108.9 kg (240 lb) 01/07/19 99.3 kg (219 lb)  
11/19/18 99.4 kg (219 lb 3.2 oz)  
11/14/18 110.2 kg (243 lb) 10/19/18 118.8 kg (261 lb 14.5 oz) 10/17/18 112.5 kg (248 lb) 10/01/18 117.7 kg (259 lb 6.4 oz) 08/31/18 111.6 kg (246 lb) 06/30/18 110.5 kg (243 lb 9.6 oz) 03/30/18 113.4 kg (250 lb) 04/16/17 70.3 kg (155 lb) 04/16/17 70.3 kg (155 lb)  
01/16/17 112 kg (247 lb)  
07/15/16 113.4 kg (250 lb) 02/09/16 117.5 kg (259 lb)  
01/29/16 117 kg (258 lb)  
01/26/16 116.1 kg (256 lb)  
01/22/16 116.7 kg (257 lb 3.2 oz) 01/15/16 115.2 kg (254 lb) 01/12/16 115.2 kg (254 lb) 01/05/16 116.1 kg (256 lb) 12/29/15 115.2 kg (254 lb) 12/22/15 117.5 kg (259 lb) 12/18/15 117.5 kg (259 lb) 12/15/15 117.5 kg (259 lb) 12/08/15 117.5 kg (259 lb) 12/04/15 117.9 kg (260 lb) NUTRITION DIAGNOSES:  
Problem:  Increased nutrient needs Etiology: related to increased protein needs Signs/Symptoms: as evidenced by surgical wound healing NUTRITION INTERVENTIONS: 
Meals/Snacks: General/healthful diet   Supplements: Commercial supplement GOAL:  
Pt will consume >50% of meals w/i4-6 days w added protein at meals Cultural, Hindu, or Ethnic Dietary Needs: None LEARNING NEEDS (Diet, Food/Nutrient-Drug Interaction):  
 [x] None Identified 
 [] Identified and Education Provided/Documented 
 [] Identified and Pt declined/was not appropriate [x] Interdisciplinary Care Plan Reviewed/Documented  
 [x] Discharge Needs: DM/Cardiac diet, increased protein [] No Nutrition Related Discharge Needs NUTRITION RISK:  
Pt Is At Nutrition Risk  [x] No Nutrition Risk Identified  [] PT SEEN FOR:  
 []  MD Consult: []Calorie Count []Diabetic Diet Education []Diet Education []Electrolyte Management []General Nutrition Management and Supplements []Management of Tube Feeding []TPN Recommendations []  RN Referral:  []MST score >=2 
   []Enteral/Parenteral Nutrition PTA []Pregnant: Gestational DM or Multigestation  
              [] Pressure Ulcer 
   
[]  Low BMI      [x]  Length of Stay       [] Dysphagia Diet     [] Ventilator      [] Follow-Up Previous Recommendations: [] Implemented          [] Not Implemented          [x] Not Applicable Previous Goal: 
 [] Met              [] Progressing Towards Goal              [] Not Progressing Towards Goal   [x] Not Applicable Isabel Maldonado RD Pager: [phone] Office: [phone]

## 2019-05-22 NOTE — PROGRESS NOTES
Problem: Self Care Deficits Care Plan (Adult) Goal: *Acute Goals and Plan of Care (Insert Text) Description Occupational Therapy Goals Initiated 5/20/2019 1. Patient will perform grooming with modified independence within 7 day(s). 2.  Patient will perform bathing with minimal assistance/contact guard assist within 7 day(s). 3.  Patient will perform lower body dressing with minimal assistance/contact guard assist within 7 day(s). 4.  Patient will perform toilet transfers with modified independence within 7 day(s). 5.  Patient will perform all aspects of toileting with modified independence within 7 day(s). 6.  Patient will participate in upper extremity therapeutic exercise/activities with supervision/set-up for 10 minutes within 7 day(s). 7.  Patient will utilize energy conservation techniques during functional activities with verbal cues within 7 day(s). Outcome: Progressing Towards Goal 
 OCCUPATIONAL THERAPY TREATMENT Patient: Nimisha Garcia (9 y.o. female) Date: 5/22/2019 Diagnosis: Cellulitis of right upper arm [L03.113] Cellulitis of right upper arm Precautions:   
Chart, occupational therapy assessment, plan of care, and goals were reviewed. ASSESSMENT: 
Pt received supine in bed, resting. Pt willing to participate with OT. Pt asked to use bathroom. Pt is mod I to supervision with bed mobility, LB dressing, bathroom mobility (using rollator) and toileting. Pt limited by use of R hand for self-care tasks due to digits 2,3,4 are gangrenous. Uncertain with the medical plan. Feel pt is progressing well under her circumstances. Will con't to follow. Progression toward goals: 
?       Improving appropriately and progressing toward goals ? Improving slowly and progressing toward goals ? Not making progress toward goals and plan of care will be adjusted PLAN: 
Patient continues to benefit from skilled intervention to address the above impairments. Continue treatment per established plan of care. Discharge Recommendations: To Be Determined Further Equipment Recommendations for Discharge:  TBD SUBJECTIVE:  
Patient stated ? I'm tired. ? OBJECTIVE DATA SUMMARY:  
Cognitive/Behavioral Status: 
Neurologic State: Alert Orientation Level: Oriented X4 Cognition: Appropriate decision making Safety/Judgement: Awareness of environment Functional Mobility and Transfers for ADLs: 
Bed Mobility: 
Supine to Sit: Modified independent Transfers: 
Sit to Stand: Supervision Functional Transfers Bathroom Mobility: Supervision/set up Toilet Transfer : Supervision Adaptive Equipment: Frutoso Costa (comment) Balance: 
Sitting: Intact Standing: Intact; With support ADL Intervention: 
  
 
Grooming Grooming Assistance: Independent Toileting Toileting Assistance: Supervision Bowel Hygiene: Supervision Adaptive Equipment: Grab bars; Frutoso Costa Cognitive Retraining Safety/Judgement: Awareness of environment Neuro Re-Education: 
  
  
  
Therapeutic Exercises:  
 
Pain: 
Pain Scale 1: Numeric (0 - 10) Pain Intensity 1: 0 Pain Location 1: Arm Pain Orientation 1: Right Pain Description 1: Aching Activity Tolerance:  
Good Please refer to the flowsheet for vital signs taken during this treatment. After treatment:  
? Patient left in no apparent distress sitting up in chair ? Patient left in no apparent distress in bed 
? Call bell left within reach ? Nursing notified ? Caregiver present ? Bed alarm activated COMMUNICATION/COLLABORATION:  
The patient?s plan of care was discussed with: Registered Nurse and  Sona Martinez OTR/L Time Calculation: 24 mins

## 2019-05-22 NOTE — PROGRESS NOTES
Physical therapy 1230 Pt received in bed, reports recently receiving pain medications but declining to participate with OOB activity with physical therapy at this time secondary to pain. Requesting assistance to set up lunch tray, offered bed to chair position, pt declined stating having her head up increased nausea. PT will attempt later this afternoon, time permitting Javon Christianson

## 2019-05-22 NOTE — PROGRESS NOTES
5/22/2019 5:14 PM Discharge recommendations are TBD. Pt lives in independent living at [facility] and was receiving home health through [address] for home health. CM will follow for final discharge recommendations. MYLES Rosenthal

## 2019-05-22 NOTE — PROGRESS NOTES
Vascular Surgery 
--wound looks much better to me despite discomfort 
--has improved from abx/wound care; not really much drainage 
--hopefully home soon with oral abx?

## 2019-05-22 NOTE — PROGRESS NOTES
Daily Progress Note: 5/22/2019 Dionicio Hernandes MD 
 
Assessment/Plan:  
Cellulitis of right upper arm (5/17/2019): infected and purulent recent surgical wounds. --Started IV cefepime and Vancomycin - changed to Daptomycin 5/20 per ID.  
--Pain control. Wound care consulted --Blood culture no growth to date 
--Wound culture +MRSA --Vascular Surg and ID consulted 
  
Severe sepsis due to cellulitis POA: she has SIRS criteria with an elevated lactate in the setting of cellulitis. --lactic acid normal 
  
Type II diabetes mellitus ([facility] (10/9/2015) / Type 2 diabetes with nephropathy ([facility] (10/1/2018): 
--A1c at 7.7% BS controlled 
  
Hypokalemia:replace as needed 
  
Coronary artery disease involving native coronary artery without angina pectoris (1/22/2016):  
--stable 
  
Essential hypertension (1/22/2016): BP stable. 
  
Recurrent deep vein thrombosis (DVT) ([facility] (3/30/2018) / Chronic anticoagulation (3/30/2018):  
--resumed Eliquis 
  
Gangrene of finger of right hand ([facility] (5/17/2019): involving right index, mid and ring fingers. --ortho has seen, no acute need for surgery at this time - will need partial amputation at some point 
  
Obesity (BMI 30-39.9) (11/13/2018): nutritional consult 
  
Atrial fibrillation ([facility] (11/16/2018):  
--HR up without bblocker, will re-start metoprolol at low dose - watch for hypotension 
  
Mild intermittent asthma (5/17/2019): not wheezing. Nebs as needed DVT proph - on eliquis Problem List: 
Problem List as of 5/22/2019 Date Reviewed: 4/26/2019 Codes Class Noted - Resolved * (Principal) Cellulitis of right upper arm ICD-10-CM: L03.113 ICD-9-CM: 682.3  5/17/2019 - Present Mild intermittent asthma ICD-10-CM: J45.20 ICD-9-CM: 493.90  5/17/2019 - Present Gangrene of finger of right hand [facility]) ICD-10-CM: N78 
ICD-9-CM: 785.4  5/17/2019 - Present Brachial artery thrombus (HCC) ICD-10-CM: W30.3 ICD-9-CM: 444.21  4/26/2019 - Present Bowel obstruction ([facility] ICD-10-CM: X37.119 ICD-9-CM: 560.9  1/8/2019 - Present Partial small bowel obstruction ([facility] ICD-10-CM: K56.600 ICD-9-CM: 560.9  1/5/2019 - Present Sleep apnea ICD-10-CM: G47.30 ICD-9-CM: 780.57  1/5/2019 - Present Overview Signed 1/5/2019  8:41 PM by Mirela Jerez, DO  
  wears CPAP Atrial fibrillation [facility]) ICD-10-CM: I48.91 
ICD-9-CM: 427.31  11/16/2018 - Present Dyspnea ICD-10-CM: R06.00 
ICD-9-CM: 786.09  11/13/2018 - Present ARF (acute renal failure) (HCC) ICD-10-CM: N17.9 ICD-9-CM: 584.9  11/13/2018 - Present Obesity (BMI 30-39.9) (Chronic) ICD-10-CM: W61.0 ICD-9-CM: 278.00  11/13/2018 - Present Closed wedge compression fracture of T7 vertebra ([facility] ICD-10-CM: N76.528T ICD-9-CM: 805.2  11/13/2018 - Present Type 2 diabetes with nephropathy ([facility] ICD-10-CM: E11.21 
ICD-9-CM: 250.40, 583.81  10/1/2018 - Present Chest pain ICD-10-CM: R07.9 ICD-9-CM: 786.50  6/27/2018 - Present Generalized abdominal pain ICD-10-CM: R10.84 ICD-9-CM: 789.07  6/27/2018 - Present Recurrent deep vein thrombosis (DVT) (HCC) ICD-10-CM: I82.409 ICD-9-CM: 453.40  3/30/2018 - Present Chronic anticoagulation (Chronic) ICD-10-CM: Z79.01 
ICD-9-CM: V58.61  3/30/2018 - Present Coronary artery disease involving native coronary artery without angina pectoris (Chronic) ICD-10-CM: I25.10 ICD-9-CM: 414.01  1/22/2016 - Present Essential hypertension (Chronic) ICD-10-CM: I10 
ICD-9-CM: 401.9  1/22/2016 - Present Type II diabetes mellitus (HCC) (Chronic) ICD-10-CM: E11.9 ICD-9-CM: 250.00  10/9/2015 - Present NSTEMI (non-ST elevated myocardial infarction) ([facility] ICD-10-CM: I21.4 ICD-9-CM: 410.70  10/9/2015 - Present RESOLVED: CAD (coronary artery disease) ICD-10-CM: I25.10 ICD-9-CM: 414.00  10/9/2015 - 1/22/2016 RESOLVED: HTN (hypertension) ICD-10-CM: I10 
ICD-9-CM: 401.9  10/9/2015 - 2016 Subjective:  
H&P: Ms. Mann Quiles is a 72 y.o. female who is being admitted for cellulitis of right upper arm. Ms. Mann Quiles presented to our Emergency Department today complaining of a progressive swelling, redness and discharge from surgery wounds following a thrombectomy of right arm including brachial, radial and ulnar arteries with wrist exploration done recently. She denies any fever or chills. Pain seems worse with movement but stable when arm is rested. She also has gangrene three finger right hand for which she was to have amputations next week. She will be admitted for further management. : No acute events overnight. Slept with CPAP. She has pain but it is currently controlled. Denies CP, SOB. 
 
: No acute events overnight. Pain now controlled this AM.  Denies CP, SOB, dizziness. :  No new complaints. Little pain currently. Continues on Vanc/Maxipime. ID and Vasc surg consulted. :  C/O pain right arm/hand. Bedside I&D of antecubital site done by Vascular Surg yesterday with purulent DC obtained and may need washout. Wound cultures with MRSA. Blood cultures remain neg. ID changed antibiotic to Daptomycin. K+ low - replacing. CT right UE results pending.  
 
:  Less pain right hand/fingers/arm at this moment. K+ sl low-replacing. Review of Systems: A comprehensive review of systems was negative except for that written in the HPI. Objective:  
Physical Exam:  
Visit Vitals /56 (BP 1 Location: Left arm, BP Patient Position: At rest) Pulse 96 Temp 98 °F (36.7 °C) Resp 18 Wt 105.7 kg (233 lb) SpO2 96% BMI 36.49 kg/m² O2 Device: Room air Temp (24hrs), Av.2 °F (36.8 °C), Min:97.5 °F (36.4 °C), Max:98.9 °F (37.2 °C) No intake/output data recorded. No intake/output data recorded. General:  Alert, cooperative, no distress, appears stated age, obese Head:  Normocephalic, without obvious abnormality, atraumatic. Eyes:  Conjunctivae/corneas clear. PERRL, EOMs intact. Nose: Nares normal. Septum midline. Mucosa normal. No drainage or sinus tenderness. Throat: Lips, mucosa, and tongue moist.. Neck: Supple, symmetrical, trachea midline, no adenopathy. Lungs:   Clear to auscultation bilaterally. Heart:  Irregularly irregular, S1, S2 normal, no murmur, click, rub or gallop. Abdomen:   Soft, non-tender. Bowel sounds normal. No masses,  No organomegaly. Extremities: LE trace edema. Dry gangre tips of 2nd/3rd fingers right hand and mid portion on 4th finger right hand. Mild swelling of dorsum of right hand. Skin: Right upper arm incision dressed and with DC on dressing. Neurologic: CNII-XII intact. Alert and oriented X 3. Data Review:  
   
Recent Days: 
Recent Labs  
  05/22/19 
0548 05/21/19 
5105 05/20/19 
0450 WBC 8.9 10.9 11.1* HGB 9.5* 9.6* 9.5* HCT 33.5* 33.6* 33.7*  
 342 328 Recent Labs  
  05/22/19 
0548 05/21/19 
1035 05/20/19 
0450  138 139  
K 3.2* 3.1* 3.3*  
 107 109* CO2 23 23 22 * 130* 124* BUN 7 8 12 CREA 0.83 0.86 0.95 CA 9.0 9.1 8.9 MG  --  1.7  --   
ALB 2.2* 2.3* 2.3*  
SGOT 23 21 27 ALT 25 27 31 No results for input(s): PH, PCO2, PO2, HCO3, FIO2 in the last 72 hours. 24 Hour Results: 
Recent Results (from the past 24 hour(s)) GLUCOSE, POC Collection Time: 05/21/19  6:57 AM  
Result Value Ref Range Glucose (POC) 123 (H) 65 - 100 mg/dL Performed by Aria Ash (PCT) GLUCOSE, POC Collection Time: 05/21/19 11:10 AM  
Result Value Ref Range Glucose (POC) 118 (H) 65 - 100 mg/dL Performed by James Aquino (PCT) GLUCOSE, POC Collection Time: 05/21/19  5:23 PM  
Result Value Ref Range Glucose (POC) 92 65 - 100 mg/dL Performed by Tri Junior (PCT) GLUCOSE, POC Collection Time: 05/21/19 10:07 PM  
Result Value Ref Range Glucose (POC) 104 (H) 65 - 100 mg/dL Performed by Minnie Morrison (PCT) METABOLIC PANEL, COMPREHENSIVE Collection Time: 05/22/19  5:48 AM  
Result Value Ref Range Sodium 138 136 - 145 mmol/L Potassium 3.2 (L) 3.5 - 5.1 mmol/L Chloride 108 97 - 108 mmol/L  
 CO2 23 21 - 32 mmol/L Anion gap 7 5 - 15 mmol/L Glucose 109 (H) 65 - 100 mg/dL BUN 7 6 - 20 MG/DL Creatinine 0.83 0.55 - 1.02 MG/DL  
 BUN/Creatinine ratio 8 (L) 12 - 20 GFR est AA >60 >60 ml/min/1.73m2 GFR est non-AA >60 >60 ml/min/1.73m2 Calcium 9.0 8.5 - 10.1 MG/DL Bilirubin, total 0.2 0.2 - 1.0 MG/DL  
 ALT (SGPT) 25 12 - 78 U/L  
 AST (SGOT) 23 15 - 37 U/L Alk. phosphatase 179 (H) 45 - 117 U/L Protein, total 5.9 (L) 6.4 - 8.2 g/dL Albumin 2.2 (L) 3.5 - 5.0 g/dL Globulin 3.7 2.0 - 4.0 g/dL A-G Ratio 0.6 (L) 1.1 - 2.2    
CBC WITH AUTOMATED DIFF Collection Time: 05/22/19  5:48 AM  
Result Value Ref Range WBC 8.9 3.6 - 11.0 K/uL  
 RBC 3.84 3.80 - 5.20 M/uL HGB 9.5 (L) 11.5 - 16.0 g/dL HCT 33.5 (L) 35.0 - 47.0 % MCV 87.2 80.0 - 99.0 FL  
 MCH 24.7 (L) 26.0 - 34.0 PG  
 MCHC 28.4 (L) 30.0 - 36.5 g/dL  
 RDW 16.7 (H) 11.5 - 14.5 % PLATELET 623 522 - 288 K/uL MPV 10.0 8.9 - 12.9 FL  
 NRBC 0.2 (H) 0  WBC ABSOLUTE NRBC 0.02 (H) 0.00 - 0.01 K/uL NEUTROPHILS PENDING % LYMPHOCYTES PENDING % MONOCYTES PENDING % EOSINOPHILS PENDING % BASOPHILS PENDING % IMMATURE GRANULOCYTES PENDING %  
 ABS. NEUTROPHILS PENDING K/UL  
 ABS. LYMPHOCYTES PENDING K/UL  
 ABS. MONOCYTES PENDING K/UL  
 ABS. EOSINOPHILS PENDING K/UL  
 ABS. BASOPHILS PENDING K/UL  
 ABS. IMM. GRANS. PENDING K/UL  
 DF PENDING Medications reviewed Current Facility-Administered Medications Medication Dose Route Frequency  potassium chloride SR (KLOR-CON 10) tablet 10 mEq  10 mEq Oral BID  DAPTOmycin (CUBICIN) 600 mg in sterile water (preservative free) 12 mL IV syringe RF formulation  600 mg IntraVENous Q24H  
 metoprolol tartrate (LOPRESSOR) tablet 12.5 mg  12.5 mg Oral Q12H  
 morphine injection 1 mg  1 mg IntraVENous Q4H PRN  
 HYDROcodone-acetaminophen (NORCO) 5-325 mg per tablet 1 Tab  1 Tab Oral Q6H PRN  
 sodium chloride (NS) flush 5-40 mL  5-40 mL IntraVENous Q8H  
 sodium chloride (NS) flush 5-40 mL  5-40 mL IntraVENous PRN  
 acetaminophen (TYLENOL) tablet 650 mg  650 mg Oral Q4H PRN  
 naloxone (NARCAN) injection 0.4 mg  0.4 mg IntraVENous PRN  
 ondansetron (ZOFRAN) injection 4 mg  4 mg IntraVENous Q4H PRN  
 sodium chloride (NS) flush 5-10 mL  5-10 mL IntraVENous PRN  
 insulin lispro (HUMALOG) injection   SubCUTAneous AC&HS  
 glucose chewable tablet 16 g  4 Tab Oral PRN  
 dextrose (D50W) injection syrg 12.5-25 g  12.5-25 g IntraVENous PRN  
 glucagon (GLUCAGEN) injection 1 mg  1 mg IntraMUSCular PRN  
 apixaban (ELIQUIS) tablet 5 mg  5 mg Oral BID  cholecalciferol (VITAMIN D3) tablet 1,000 Units  1,000 Units Oral DAILY  clopidogrel (PLAVIX) tablet 75 mg  75 mg Oral DAILY  DULoxetine (CYMBALTA) capsule 60 mg  60 mg Oral DAILY  lactobac ac& pc-s.therm-b.anim (NUBIA Q/RISAQUAD)  1 Cap Oral DAILY  mirtazapine (REMERON) tablet 15 mg  15 mg Oral QHS  montelukast (SINGULAIR) tablet 10 mg  10 mg Oral QHS  pantoprazole (PROTONIX) tablet 20 mg  (Patient Supplied)  20 mg Oral ACD  sAXagliptin (ONGLYZA) tablet 5 mg  (Patient Supplied)  5 mg Oral DAILY  traMADol (ULTRAM) tablet 50 mg  50 mg Oral Q8H PRN Care Plan discussed with: Patient/nurse/ID Total time spent with patient: 30 minutes.  
 
Alicja Cheema MD

## 2019-05-23 LAB
ALBUMIN SERPL-MCNC: 2.2 G/DL (ref 3.5–5)
ALBUMIN/GLOB SERPL: 0.6 {RATIO} (ref 1.1–2.2)
ALP SERPL-CCNC: 174 U/L (ref 45–117)
ALT SERPL-CCNC: 24 U/L (ref 12–78)
ANION GAP SERPL CALC-SCNC: 5 MMOL/L (ref 5–15)
AST SERPL-CCNC: 23 U/L (ref 15–37)
BACTERIA SPEC CULT: NORMAL
BACTERIA SPEC CULT: NORMAL
BASOPHILS # BLD: 0.1 K/UL (ref 0–0.1)
BASOPHILS NFR BLD: 1 % (ref 0–1)
BILIRUB SERPL-MCNC: 0.3 MG/DL (ref 0.2–1)
BUN SERPL-MCNC: 8 MG/DL (ref 6–20)
BUN/CREAT SERPL: 9 (ref 12–20)
CALCIUM SERPL-MCNC: 9.1 MG/DL (ref 8.5–10.1)
CHLORIDE SERPL-SCNC: 109 MMOL/L (ref 97–108)
CO2 SERPL-SCNC: 24 MMOL/L (ref 21–32)
CREAT SERPL-MCNC: 0.85 MG/DL (ref 0.55–1.02)
DIFFERENTIAL METHOD BLD: ABNORMAL
EOSINOPHIL # BLD: 1 K/UL (ref 0–0.4)
EOSINOPHIL NFR BLD: 10 % (ref 0–7)
ERYTHROCYTE [DISTWIDTH] IN BLOOD BY AUTOMATED COUNT: 16.6 % (ref 11.5–14.5)
GLOBULIN SER CALC-MCNC: 3.9 G/DL (ref 2–4)
GLUCOSE BLD STRIP.AUTO-MCNC: 108 MG/DL (ref 65–100)
GLUCOSE BLD STRIP.AUTO-MCNC: 80 MG/DL (ref 65–100)
GLUCOSE BLD STRIP.AUTO-MCNC: 92 MG/DL (ref 65–100)
GLUCOSE BLD STRIP.AUTO-MCNC: 97 MG/DL (ref 65–100)
GLUCOSE SERPL-MCNC: 100 MG/DL (ref 65–100)
HCT VFR BLD AUTO: 32.6 % (ref 35–47)
HGB BLD-MCNC: 9.2 G/DL (ref 11.5–16)
IMM GRANULOCYTES # BLD AUTO: 0.1 K/UL (ref 0–0.04)
IMM GRANULOCYTES NFR BLD AUTO: 1 % (ref 0–0.5)
LYMPHOCYTES # BLD: 2.2 K/UL (ref 0.8–3.5)
LYMPHOCYTES NFR BLD: 22 % (ref 12–49)
MCH RBC QN AUTO: 24.4 PG (ref 26–34)
MCHC RBC AUTO-ENTMCNC: 28.2 G/DL (ref 30–36.5)
MCV RBC AUTO: 86.5 FL (ref 80–99)
MONOCYTES # BLD: 1.1 K/UL (ref 0–1)
MONOCYTES NFR BLD: 11 % (ref 5–13)
NEUTS SEG # BLD: 5.7 K/UL (ref 1.8–8)
NEUTS SEG NFR BLD: 55 % (ref 32–75)
NRBC # BLD: 0 K/UL (ref 0–0.01)
NRBC BLD-RTO: 0 PER 100 WBC
PLATELET # BLD AUTO: 327 K/UL (ref 150–400)
PLATELET COMMENTS,PCOM: ABNORMAL
PMV BLD AUTO: 9.4 FL (ref 8.9–12.9)
POTASSIUM SERPL-SCNC: 3 MMOL/L (ref 3.5–5.1)
PROT SERPL-MCNC: 6.1 G/DL (ref 6.4–8.2)
RBC # BLD AUTO: 3.77 M/UL (ref 3.8–5.2)
RBC MORPH BLD: ABNORMAL
RBC MORPH BLD: ABNORMAL
SERVICE CMNT-IMP: ABNORMAL
SERVICE CMNT-IMP: NORMAL
SODIUM SERPL-SCNC: 138 MMOL/L (ref 136–145)
WBC # BLD AUTO: 10.2 K/UL (ref 3.6–11)

## 2019-05-23 PROCEDURE — 94760 N-INVAS EAR/PLS OXIMETRY 1: CPT

## 2019-05-23 PROCEDURE — 36415 COLL VENOUS BLD VENIPUNCTURE: CPT

## 2019-05-23 PROCEDURE — 74011250637 HC RX REV CODE- 250/637: Performed by: FAMILY MEDICINE

## 2019-05-23 PROCEDURE — 85025 COMPLETE CBC W/AUTO DIFF WBC: CPT

## 2019-05-23 PROCEDURE — 80053 COMPREHEN METABOLIC PANEL: CPT

## 2019-05-23 PROCEDURE — 74011250636 HC RX REV CODE- 250/636: Performed by: INTERNAL MEDICINE

## 2019-05-23 PROCEDURE — 82962 GLUCOSE BLOOD TEST: CPT

## 2019-05-23 PROCEDURE — 65270000029 HC RM PRIVATE

## 2019-05-23 PROCEDURE — 74011250637 HC RX REV CODE- 250/637: Performed by: INTERNAL MEDICINE

## 2019-05-23 RX ORDER — CLINDAMYCIN PHOSPHATE 600 MG/50ML
600 INJECTION, SOLUTION INTRAVENOUS EVERY 6 HOURS
Status: DISCONTINUED | OUTPATIENT
Start: 2019-05-23 | End: 2019-05-24 | Stop reason: HOSPADM

## 2019-05-23 RX ORDER — POTASSIUM CHLORIDE 1.5 G/1.77G
40 POWDER, FOR SOLUTION ORAL 2 TIMES DAILY WITH MEALS
Status: DISPENSED | OUTPATIENT
Start: 2019-05-23 | End: 2019-05-24

## 2019-05-23 RX ORDER — HYDROCODONE BITARTRATE AND ACETAMINOPHEN 5; 325 MG/1; MG/1
1 TABLET ORAL
Status: DISCONTINUED | OUTPATIENT
Start: 2019-05-23 | End: 2019-05-24 | Stop reason: HOSPADM

## 2019-05-23 RX ADMIN — APIXABAN 5 MG: 5 TABLET, FILM COATED ORAL at 08:29

## 2019-05-23 RX ADMIN — CLOPIDOGREL 75 MG: 75 TABLET, FILM COATED ORAL at 08:28

## 2019-05-23 RX ADMIN — POTASSIUM CHLORIDE 20 MEQ: 750 TABLET, FILM COATED, EXTENDED RELEASE ORAL at 16:20

## 2019-05-23 RX ADMIN — MONTELUKAST SODIUM 10 MG: 10 TABLET, FILM COATED ORAL at 21:52

## 2019-05-23 RX ADMIN — CLINDAMYCIN PHOSPHATE 600 MG: 600 INJECTION, SOLUTION INTRAVENOUS at 23:19

## 2019-05-23 RX ADMIN — DULOXETINE HYDROCHLORIDE 60 MG: 30 CAPSULE, DELAYED RELEASE ORAL at 08:28

## 2019-05-23 RX ADMIN — METOPROLOL TARTRATE 12.5 MG: 25 TABLET ORAL at 08:28

## 2019-05-23 RX ADMIN — METOPROLOL TARTRATE 12.5 MG: 25 TABLET ORAL at 21:52

## 2019-05-23 RX ADMIN — Medication 10 ML: at 21:53

## 2019-05-23 RX ADMIN — Medication 1 CAPSULE: at 08:29

## 2019-05-23 RX ADMIN — CLINDAMYCIN PHOSPHATE 600 MG: 600 INJECTION, SOLUTION INTRAVENOUS at 20:19

## 2019-05-23 RX ADMIN — Medication 10 ML: at 14:00

## 2019-05-23 RX ADMIN — MIRTAZAPINE 15 MG: 15 TABLET, FILM COATED ORAL at 21:52

## 2019-05-23 RX ADMIN — VITAMIN D, TAB 1000IU (100/BT) 1000 UNITS: 25 TAB at 08:29

## 2019-05-23 RX ADMIN — POTASSIUM CHLORIDE 20 MEQ: 750 TABLET, FILM COATED, EXTENDED RELEASE ORAL at 08:28

## 2019-05-23 RX ADMIN — APIXABAN 5 MG: 5 TABLET, FILM COATED ORAL at 21:52

## 2019-05-23 RX ADMIN — TRAMADOL HYDROCHLORIDE 50 MG: 50 TABLET, FILM COATED ORAL at 16:19

## 2019-05-23 RX ADMIN — HYDROCODONE BITARTRATE AND ACETAMINOPHEN 1 TABLET: 5; 325 TABLET ORAL at 21:52

## 2019-05-23 RX ADMIN — CLINDAMYCIN PHOSPHATE 600 MG: 600 INJECTION, SOLUTION INTRAVENOUS at 13:33

## 2019-05-23 RX ADMIN — HYDROCODONE BITARTRATE AND ACETAMINOPHEN 1 TABLET: 5; 325 TABLET ORAL at 13:33

## 2019-05-23 RX ADMIN — PANTOPRAZOLE SODIUM 40 MG: 40 TABLET, DELAYED RELEASE ORAL at 16:19

## 2019-05-23 RX ADMIN — HYDROCODONE BITARTRATE AND ACETAMINOPHEN 1 TABLET: 5; 325 TABLET ORAL at 08:28

## 2019-05-23 RX ADMIN — HYDROCODONE BITARTRATE AND ACETAMINOPHEN 1 TABLET: 5; 325 TABLET ORAL at 00:55

## 2019-05-23 NOTE — PROGRESS NOTES
Daily Progress Note: 5/23/2019 Breana Gaviria MD 
 
Assessment/Plan:  
Cellulitis of right upper arm (5/17/2019): infected and purulent recent surgical wounds. --Started IV cefepime and Vancomycin - changed to Daptomycin 5/20 per ID.  
--Pain control. Wound care consulted --Blood culture no growth to date 
--Wound culture +MRSA - sensitive to Clinda - ID to decide on poss po tx 
--Vascular Surg and ID consulted 
  
Severe sepsis due to cellulitis POA: she had SIRS criteria with an elevated lactate in the setting of cellulitis. --lactic acid normalized 
  
Type II diabetes mellitus ([facility] (10/9/2015) / Type 2 diabetes with nephropathy ([facility] (10/1/2018): 
--A1c at 7.7% BS controlled 
  
Hypokalemia:replace as needed 
  
Coronary artery disease involving native coronary artery without angina pectoris (1/22/2016):  
--stable 
  
Essential hypertension (1/22/2016): BP stable. 
  
Recurrent deep vein thrombosis (DVT) ([facility] (3/30/2018) / Chronic anticoagulation (3/30/2018):  
--resumed Eliquis 
  
Gangrene of finger of right hand ([facility] (5/17/2019): involving right index, mid and ring fingers. --ortho has seen, no acute need for surgery at this time - will need partial amputations at some point 
  
Obesity (BMI 30-39.9) (11/13/2018): nutritional consult 
  
Atrial fibrillation ([facility] (11/16/2018):  
--HR up without bblocker, will re-start metoprolol at low dose - watch for hypotension 
  
Mild intermittent asthma (5/17/2019): not wheezing. Nebs as needed DVT proph - on eliquis Problem List: 
Problem List as of 5/23/2019 Date Reviewed: 4/26/2019 Codes Class Noted - Resolved * (Principal) Cellulitis of right upper arm ICD-10-CM: L03.113 ICD-9-CM: 682.3  5/17/2019 - Present Mild intermittent asthma ICD-10-CM: J45.20 ICD-9-CM: 493.90  5/17/2019 - Present Gangrene of finger of right hand [facility]) ICD-10-CM: V26 
ICD-9-CM: 785.4  5/17/2019 - Present Brachial artery thrombus (HCC) ICD-10-CM: S93.2 ICD-9-CM: 444.21  4/26/2019 - Present Bowel obstruction ([facility] ICD-10-CM: Q09.327 ICD-9-CM: 560.9  1/8/2019 - Present Partial small bowel obstruction ([facility] ICD-10-CM: K56.600 ICD-9-CM: 560.9  1/5/2019 - Present Sleep apnea ICD-10-CM: G47.30 ICD-9-CM: 780.57  1/5/2019 - Present Overview Signed 1/5/2019  8:41 PM by Jaya Pierce DO  
  wears CPAP Atrial fibrillation [facility]) ICD-10-CM: I48.91 
ICD-9-CM: 427.31  11/16/2018 - Present Dyspnea ICD-10-CM: R06.00 
ICD-9-CM: 786.09  11/13/2018 - Present ARF (acute renal failure) (HCC) ICD-10-CM: N17.9 ICD-9-CM: 584.9  11/13/2018 - Present Obesity (BMI 30-39.9) (Chronic) ICD-10-CM: U25.3 ICD-9-CM: 278.00  11/13/2018 - Present Closed wedge compression fracture of T7 vertebra ([facility] ICD-10-CM: P17.181F ICD-9-CM: 805.2  11/13/2018 - Present Type 2 diabetes with nephropathy ([facility] ICD-10-CM: E11.21 
ICD-9-CM: 250.40, 583.81  10/1/2018 - Present Chest pain ICD-10-CM: R07.9 ICD-9-CM: 786.50  6/27/2018 - Present Generalized abdominal pain ICD-10-CM: R10.84 ICD-9-CM: 789.07  6/27/2018 - Present Recurrent deep vein thrombosis (DVT) (HCC) ICD-10-CM: I82.409 ICD-9-CM: 453.40  3/30/2018 - Present Chronic anticoagulation (Chronic) ICD-10-CM: Z79.01 
ICD-9-CM: V58.61  3/30/2018 - Present Coronary artery disease involving native coronary artery without angina pectoris (Chronic) ICD-10-CM: I25.10 ICD-9-CM: 414.01  1/22/2016 - Present Essential hypertension (Chronic) ICD-10-CM: I10 
ICD-9-CM: 401.9  1/22/2016 - Present Type II diabetes mellitus (HCC) (Chronic) ICD-10-CM: E11.9 ICD-9-CM: 250.00  10/9/2015 - Present NSTEMI (non-ST elevated myocardial infarction) ([facility] ICD-10-CM: I21.4 ICD-9-CM: 410.70  10/9/2015 - Present RESOLVED: CAD (coronary artery disease) ICD-10-CM: I25.10 ICD-9-CM: 414.00  10/9/2015 - 1/22/2016 RESOLVED: HTN (hypertension) ICD-10-CM: I10 
ICD-9-CM: 401.9  10/9/2015 - 1/22/2016 Subjective:  
H&P: Ms. Michele Batista is a 72 y.o. female who is being admitted for cellulitis of right upper arm. Ms. Michele Batista presented to our Emergency Department today complaining of a progressive swelling, redness and discharge from surgery wounds following a thrombectomy of right arm including brachial, radial and ulnar arteries with wrist exploration done recently. She denies any fever or chills. Pain seems worse with movement but stable when arm is rested. She also has gangrene three finger right hand for which she was to have amputations next week. She will be admitted for further management. 5/18: No acute events overnight. Slept with CPAP. She has pain but it is currently controlled. Denies CP, SOB. 
 
5/19: No acute events overnight. Pain now controlled this AM.  Denies CP, SOB, dizziness. 5/20:  No new complaints. Little pain currently. Continues on Vanc/Maxipime. ID and Vasc surg consulted. 5/21:  C/O pain right arm/hand. Bedside I&D of antecubital site done by Vascular Surg yesterday with purulent DC obtained and may need washout. Wound cultures with MRSA. Blood cultures remain neg. ID changed antibiotic to Daptomycin. K+ low - replacing. CT right UE results pending.  
 
5/22:  Less pain right hand/fingers/arm at this moment. K+ sl low-replacing.   
 
5/23:  She reports she is improving and has less pain and swelling. She does not think she will need rehab.  K+ still low - replacing. Review of Systems: A comprehensive review of systems was negative except for that written in the HPI. Objective:  
Physical Exam:  
Visit Vitals /56 (BP 1 Location: Left arm, BP Patient Position: At rest) Pulse (!) 101 Temp 98 °F (36.7 °C) Resp 16 Ht 5' 7\" (1.702 m) Wt 105.7 kg (233 lb) SpO2 92% Breastfeeding?  No  
 BMI 36.49 kg/m² O2 Device: Room air Temp (24hrs), Av.4 °F (36.9 °C), Min:98 °F (36.7 °C), Max:99 °F (37.2 °C) No intake/output data recorded. No intake/output data recorded. General:  Alert, cooperative, no distress, appears stated age, obese Head:  Normocephalic, without obvious abnormality, atraumatic. Eyes:  Conjunctivae/corneas clear. PERRL, EOMs intact. Throat: Lips, mucosa, and tongue moist.. Neck: Supple, symmetrical, trachea midline, no adenopathy. Lungs:   Clear to auscultation bilaterally. Heart:  Irregularly irregular, S1, S2 normal, no murmur, click, rub or gallop. Abdomen:   Soft, non-tender. Bowel sounds normal. No masses,  No organomegaly. Extremities: LE trace edema. Dry gangre tips of 2nd/3rd fingers right hand and mid portion on 4th finger right hand. swelling of dorsum of right hand looks better. Antecubital area looks better. Skin: Right forearm incision dressed, dressing dry. Neurologic: CNII-XII intact. Alert and oriented X 3. Data Review:  
   
Recent Days: 
Recent Labs  
  19 
0415 19 
0548 19 
6765 WBC 10.2 8.9 10.9 HGB 9.2* 9.5* 9.6* HCT 32.6* 33.5* 33.6*  
 307 342 Recent Labs  
  19 
0415 19 
0548 19 
8742  138 138  
K 3.0* 3.2* 3.1*  
* 108 107 CO2 24  23  109* 130* BUN 8 7 8 CREA 0.85 0.83 0.86 CA 9.1 9.0 9.1 MG  --   --  1.7 ALB 2.2* 2.2* 2.3*  
SGOT  21 ALT 24 25 27 No results for input(s): PH, PCO2, PO2, HCO3, FIO2 in the last 72 hours. 24 Hour Results: 
Recent Results (from the past 24 hour(s)) GLUCOSE, POC Collection Time: 19 10:51 AM  
Result Value Ref Range Glucose (POC) 113 (H) 65 - 100 mg/dL Performed by Miley Nagy GLUCOSE, POC Collection Time: 19  4:05 PM  
Result Value Ref Range Glucose (POC) 116 (H) 65 - 100 mg/dL Performed by Miley Nagy GLUCOSE, POC  
 Collection Time: 05/22/19  9:19 PM  
Result Value Ref Range Glucose (POC) 104 (H) 65 - 100 mg/dL Performed by Melo Downey (PCT) METABOLIC PANEL, COMPREHENSIVE Collection Time: 05/23/19  4:15 AM  
Result Value Ref Range Sodium 138 136 - 145 mmol/L Potassium 3.0 (L) 3.5 - 5.1 mmol/L Chloride 109 (H) 97 - 108 mmol/L  
 CO2 24 21 - 32 mmol/L Anion gap 5 5 - 15 mmol/L Glucose 100 65 - 100 mg/dL BUN 8 6 - 20 MG/DL Creatinine 0.85 0.55 - 1.02 MG/DL  
 BUN/Creatinine ratio 9 (L) 12 - 20 GFR est AA >60 >60 ml/min/1.73m2 GFR est non-AA >60 >60 ml/min/1.73m2 Calcium 9.1 8.5 - 10.1 MG/DL Bilirubin, total 0.3 0.2 - 1.0 MG/DL  
 ALT (SGPT) 24 12 - 78 U/L  
 AST (SGOT) 23 15 - 37 U/L Alk. phosphatase 174 (H) 45 - 117 U/L Protein, total 6.1 (L) 6.4 - 8.2 g/dL Albumin 2.2 (L) 3.5 - 5.0 g/dL Globulin 3.9 2.0 - 4.0 g/dL A-G Ratio 0.6 (L) 1.1 - 2.2    
CBC WITH AUTOMATED DIFF Collection Time: 05/23/19  4:15 AM  
Result Value Ref Range WBC 10.2 3.6 - 11.0 K/uL  
 RBC 3.77 (L) 3.80 - 5.20 M/uL HGB 9.2 (L) 11.5 - 16.0 g/dL HCT 32.6 (L) 35.0 - 47.0 % MCV 86.5 80.0 - 99.0 FL  
 MCH 24.4 (L) 26.0 - 34.0 PG  
 MCHC 28.2 (L) 30.0 - 36.5 g/dL  
 RDW 16.6 (H) 11.5 - 14.5 % PLATELET 405 742 - 547 K/uL MPV 9.4 8.9 - 12.9 FL  
 NRBC 0.0 0  WBC ABSOLUTE NRBC 0.00 0.00 - 0.01 K/uL NEUTROPHILS 55 32 - 75 % LYMPHOCYTES 22 12 - 49 % MONOCYTES 11 5 - 13 % EOSINOPHILS 10 (H) 0 - 7 % BASOPHILS 1 0 - 1 % IMMATURE GRANULOCYTES 1 (H) 0.0 - 0.5 % ABS. NEUTROPHILS 5.7 1.8 - 8.0 K/UL  
 ABS. LYMPHOCYTES 2.2 0.8 - 3.5 K/UL  
 ABS. MONOCYTES 1.1 (H) 0.0 - 1.0 K/UL  
 ABS. EOSINOPHILS 1.0 (H) 0.0 - 0.4 K/UL  
 ABS. BASOPHILS 0.1 0.0 - 0.1 K/UL  
 ABS. IMM. GRANS. 0.1 (H) 0.00 - 0.04 K/UL  
 DF AUTOMATED PLATELET COMMENTS CLUMPED PLATELETS    
 RBC COMMENTS ANISOCYTOSIS 1+ 
    
 RBC COMMENTS HYPOCHROMIA 1+ GLUCOSE, POC  
 Collection Time: 05/23/19  6:45 AM  
Result Value Ref Range Glucose (POC) 97 65 - 100 mg/dL Performed by Jone Cerna (PCT) Medications reviewed Current Facility-Administered Medications Medication Dose Route Frequency  potassium chloride SR (KLOR-CON 10) tablet 20 mEq  20 mEq Oral BID  pantoprazole (PROTONIX) tablet 40 mg  40 mg Oral ACD  DAPTOmycin (CUBICIN) 600 mg in sterile water (preservative free) 12 mL IV syringe RF formulation  600 mg IntraVENous Q24H  
 metoprolol tartrate (LOPRESSOR) tablet 12.5 mg  12.5 mg Oral Q12H  
 morphine injection 1 mg  1 mg IntraVENous Q4H PRN  
 HYDROcodone-acetaminophen (NORCO) 5-325 mg per tablet 1 Tab  1 Tab Oral Q6H PRN  
 sodium chloride (NS) flush 5-40 mL  5-40 mL IntraVENous Q8H  
 sodium chloride (NS) flush 5-40 mL  5-40 mL IntraVENous PRN  
 acetaminophen (TYLENOL) tablet 650 mg  650 mg Oral Q4H PRN  
 naloxone (NARCAN) injection 0.4 mg  0.4 mg IntraVENous PRN  
 ondansetron (ZOFRAN) injection 4 mg  4 mg IntraVENous Q4H PRN  
 sodium chloride (NS) flush 5-10 mL  5-10 mL IntraVENous PRN  
 insulin lispro (HUMALOG) injection   SubCUTAneous AC&HS  
 glucose chewable tablet 16 g  4 Tab Oral PRN  
 dextrose (D50W) injection syrg 12.5-25 g  12.5-25 g IntraVENous PRN  
 glucagon (GLUCAGEN) injection 1 mg  1 mg IntraMUSCular PRN  
 apixaban (ELIQUIS) tablet 5 mg  5 mg Oral BID  cholecalciferol (VITAMIN D3) tablet 1,000 Units  1,000 Units Oral DAILY  clopidogrel (PLAVIX) tablet 75 mg  75 mg Oral DAILY  DULoxetine (CYMBALTA) capsule 60 mg  60 mg Oral DAILY  lactobac ac& pc-s.therm-b.anim (NUBIA Q/RISAQUAD)  1 Cap Oral DAILY  mirtazapine (REMERON) tablet 15 mg  15 mg Oral QHS  montelukast (SINGULAIR) tablet 10 mg  10 mg Oral QHS  sAXagliptin (ONGLYZA) tablet 5 mg  (Patient Supplied)  5 mg Oral DAILY  traMADol (ULTRAM) tablet 50 mg  50 mg Oral Q8H PRN Care Plan discussed with: Patient/nurse/ID Total time spent with patient: 30 minutes.  
 
Sami Hoffman MD

## 2019-05-23 NOTE — PROGRESS NOTES
5/23/2019 3:42 PM Planning for pt to discharge home back to independent living at [facility] when medically stable. Pt will resume home health through [facility]. CM called pt's daughter, Aniyah, confirmed discharge. Pt's daughter was understanding and confirmed she will transport pt home at discharge. Will need home health order prior to discharge. CM will follow. MYLES Song

## 2019-05-23 NOTE — PROGRESS NOTES
Bedside and Verbal shift change report given to [facility] (oncoming nurse) by Fany Cruz (offgoing nurse). Report included the following information SBAR, Kardex, OR Summary, Intake/Output, MAR and Recent Results.

## 2019-05-23 NOTE — PROGRESS NOTES
[facility] Progress Note Power Tabares DO 
  [phone] Office [phone]  Fax 2019 Assessment & Plan: 1. Right upper extremity cellulitis, status post recent thrombectomy involving the brachial, radial and ulnar arteries with wrist exploration on 2019. Cultures are growing MRSA. Possible abscess vs seroma seen on CT scan. Await further input from Vascular Surgery. Narrow abx to clindamycin. Transition to clinda 300mg po q 6 hours through 19 at discharge with close o/p f/u by vascular surgery. 2. Right hand dry gangrene involving three fingers. This does not appear to be clinically infected. Continue local wound care. No antibiotic treatment planned. 3. MULTIPLE ANTIBIOTIC ALLERGIES INCLUDING SULFA, TETRACYCLINE AND CIPROFLOXACIN. 4. Malar rash. Question drug-induced subacute cutaneous lupus erythematosus? Improving. Abx classes changed Subjective:  
 
Arm feeling better Objective:  
 
Vitals:  
Visit Vitals /51 (BP 1 Location: Left arm, BP Patient Position: At rest) Pulse 89 Temp 98.5 °F (36.9 °C) Resp 20 Ht 5' 7\" (1.702 m) Wt 105.7 kg (233 lb) SpO2 98% Breastfeeding? No  
BMI 36.49 kg/m² Tmax:  Temp (24hrs), Av.5 °F (36.9 °C), Min:98 °F (36.7 °C), Max:99 °F (37.2 °C) Exam:  
Patient is intubated:  no 
 
Physical Examination:  
General:  Alert, cooperative, no distress Head:  Normocephalic, atraumatic. Eyes:  Conjunctivae clear Neck: Supple Lungs:   No distress. Clear to auscultation bilaterally. Chest wall:    
Heart:  Regular rate and rhythm, S1, S2 normal, no murmur Abdomen:   Soft, non-tender, non-distended Extremities: RUE dressed Skin: Malar rash fading Neurologic: CNII-XII intact. Labs:     
 
No lab exists for component: ITNL Recent Labs  
  19 
8614 CPK 18* Recent Labs  
  19 
0415 19 
0548 19 
5760  138 138 K 3.0* 3.2* 3.1*  
* 108 107 CO2 24 23 23 BUN 8 7 8 CREA 0.85 0.83 0.86  109* 130* MG  --   --  1.7 ALB 2.2* 2.2* 2.3* WBC 10.2 8.9 10.9 HGB 9.2* 9.5* 9.6* HCT 32.6* 33.5* 33.6*  
 307 342 No results for input(s): INR, PTP, APTT in the last 72 hours. No lab exists for component: INREXT, INREXT Needs: urine analysis, urine sodium, protein and creatinine No results found for: Vane Toribio Cultures: No results found for: SDES Lab Results Component Value Date/Time Culture result: (A) 05/17/2019 06:34 PM  
  LIGHT **METHICILLIN RESISTANT STAPHYLOCOCCUS AUREUS**  
 Culture result:  05/17/2019 06:34 PM  
  CALLED TO AND READ BACK BY 
BLADIMIR Gomez AT 14:40 ON 5/18/19 BY [facility]. Culture result: NO GROWTH 6 DAYS 05/17/2019 06:34 PM  
 
 
Radiology:  
 
Medications Current Facility-Administered Medications Medication Dose Route Frequency Last Dose  potassium chloride (KLOR-CON) packet for solution 40 mEq  40 mEq Oral BID WITH MEALS  potassium chloride SR (KLOR-CON 10) tablet 20 mEq  20 mEq Oral BID 20 mEq at 05/23/19 8140  pantoprazole (PROTONIX) tablet 40 mg  40 mg Oral ACD 40 mg at 05/22/19 7663  DAPTOmycin (CUBICIN) 600 mg in sterile water (preservative free) 12 mL IV syringe RF formulation  600 mg IntraVENous Q24H 600 mg at 05/22/19 1905  metoprolol tartrate (LOPRESSOR) tablet 12.5 mg  12.5 mg Oral Q12H 12.5 mg at 05/23/19 6649  morphine injection 1 mg  1 mg IntraVENous Q4H PRN 1 mg at 05/18/19 0457  
 HYDROcodone-acetaminophen (NORCO) 5-325 mg per tablet 1 Tab  1 Tab Oral Q6H PRN 1 Tab at 05/23/19 8754  sodium chloride (NS) flush 5-40 mL  5-40 mL IntraVENous Q8H 10 mL at 05/22/19 2148  sodium chloride (NS) flush 5-40 mL  5-40 mL IntraVENous PRN    
 acetaminophen (TYLENOL) tablet 650 mg  650 mg Oral Q4H  mg at 05/18/19 0223  
 naloxone (NARCAN) injection 0.4 mg  0.4 mg IntraVENous PRN    
  ondansetron (ZOFRAN) injection 4 mg  4 mg IntraVENous Q4H PRN 4 mg at 05/22/19 1454  sodium chloride (NS) flush 5-10 mL  5-10 mL IntraVENous PRN    
 insulin lispro (HUMALOG) injection   SubCUTAneous AC&HS Stopped at 05/17/19 2200  
 glucose chewable tablet 16 g  4 Tab Oral PRN    
 dextrose (D50W) injection syrg 12.5-25 g  12.5-25 g IntraVENous PRN    
 glucagon (GLUCAGEN) injection 1 mg  1 mg IntraMUSCular PRN    
 apixaban (ELIQUIS) tablet 5 mg  5 mg Oral BID 5 mg at 05/23/19 4025  cholecalciferol (VITAMIN D3) tablet 1,000 Units  1,000 Units Oral DAILY 1,000 Units at 05/23/19 9621  clopidogrel (PLAVIX) tablet 75 mg  75 mg Oral DAILY 75 mg at 05/23/19 4727  DULoxetine (CYMBALTA) capsule 60 mg  60 mg Oral DAILY 60 mg at 05/23/19 1054  lactobac ac& pc-s.therm-b.anim (NUBIA Q/RISAQUAD)  1 Cap Oral DAILY 1 Cap at 05/23/19 0829  
 mirtazapine (REMERON) tablet 15 mg  15 mg Oral QHS 15 mg at 05/22/19 2144  
 montelukast (SINGULAIR) tablet 10 mg  10 mg Oral QHS 10 mg at 05/22/19 2144  sAXagliptin (ONGLYZA) tablet 5 mg  (Patient Supplied)  5 mg Oral DAILY Stopped at 05/22/19 0900  
 traMADol (ULTRAM) tablet 50 mg  50 mg Oral Q8H PRN 50 mg at 05/22/19 1454 Case discussed with: 
 
 
River Mayers DO

## 2019-05-23 NOTE — PROGRESS NOTES
Physical Therapy 1225 Pt received in bed, declining to participate with physical therapy stating \"I can get up if I want to and I don't want to\"  Attempted to explain purpose and importance of therapy while in hospital Pt continued to decline. Pt will follow up next treatment day. Elieser Quinonez

## 2019-05-23 NOTE — PROGRESS NOTES
Occupational Therapy Note: Pt asleep but awakened easily. Pt declined participating in any functional tasks including getting out of bed. Will attempt OT next treatment date.

## 2019-05-23 NOTE — PROGRESS NOTES
Doing better Ok for home on oral abx from my standpoint We need to confirm that she can get in to see a hand surgeon on discharge, as they refused to see her last time she was an outpatient citing financial concerns.

## 2019-05-24 VITALS
DIASTOLIC BLOOD PRESSURE: 55 MMHG | HEART RATE: 81 BPM | BODY MASS INDEX: 36.43 KG/M2 | OXYGEN SATURATION: 94 % | RESPIRATION RATE: 14 BRPM | TEMPERATURE: 98.4 F | WEIGHT: 232.1 LBS | HEIGHT: 67 IN | SYSTOLIC BLOOD PRESSURE: 110 MMHG

## 2019-05-24 LAB
ALBUMIN SERPL-MCNC: 2.3 G/DL (ref 3.5–5)
ALBUMIN/GLOB SERPL: 0.6 {RATIO} (ref 1.1–2.2)
ALP SERPL-CCNC: 165 U/L (ref 45–117)
ALT SERPL-CCNC: 22 U/L (ref 12–78)
ANION GAP SERPL CALC-SCNC: 8 MMOL/L (ref 5–15)
AST SERPL-CCNC: 21 U/L (ref 15–37)
BACTERIA SPEC CULT: NORMAL
BASOPHILS # BLD: 0.1 K/UL (ref 0–0.1)
BASOPHILS NFR BLD: 1 % (ref 0–1)
BILIRUB SERPL-MCNC: 0.3 MG/DL (ref 0.2–1)
BUN SERPL-MCNC: 9 MG/DL (ref 6–20)
BUN/CREAT SERPL: 9 (ref 12–20)
C JEJUNI+C COLI AG STL QL: NEGATIVE
CALCIUM SERPL-MCNC: 9.1 MG/DL (ref 8.5–10.1)
CHLORIDE SERPL-SCNC: 108 MMOL/L (ref 97–108)
CO2 SERPL-SCNC: 23 MMOL/L (ref 21–32)
CREAT SERPL-MCNC: 1.03 MG/DL (ref 0.55–1.02)
DIFFERENTIAL METHOD BLD: ABNORMAL
E COLI SXT1+2 STL IA: NEGATIVE
EOSINOPHIL # BLD: 0.8 K/UL (ref 0–0.4)
EOSINOPHIL NFR BLD: 9 % (ref 0–7)
ERYTHROCYTE [DISTWIDTH] IN BLOOD BY AUTOMATED COUNT: 16.7 % (ref 11.5–14.5)
GLOBULIN SER CALC-MCNC: 3.9 G/DL (ref 2–4)
GLUCOSE BLD STRIP.AUTO-MCNC: 100 MG/DL (ref 65–100)
GLUCOSE BLD STRIP.AUTO-MCNC: 84 MG/DL (ref 65–100)
GLUCOSE SERPL-MCNC: 90 MG/DL (ref 65–100)
HCT VFR BLD AUTO: 32.6 % (ref 35–47)
HGB BLD-MCNC: 9 G/DL (ref 11.5–16)
IMM GRANULOCYTES # BLD AUTO: 0.1 K/UL (ref 0–0.04)
IMM GRANULOCYTES NFR BLD AUTO: 1 % (ref 0–0.5)
LYMPHOCYTES # BLD: 1.8 K/UL (ref 0.8–3.5)
LYMPHOCYTES NFR BLD: 19 % (ref 12–49)
MCH RBC QN AUTO: 23.9 PG (ref 26–34)
MCHC RBC AUTO-ENTMCNC: 27.6 G/DL (ref 30–36.5)
MCV RBC AUTO: 86.5 FL (ref 80–99)
MONOCYTES # BLD: 0.8 K/UL (ref 0–1)
MONOCYTES NFR BLD: 9 % (ref 5–13)
NEUTS SEG # BLD: 5.7 K/UL (ref 1.8–8)
NEUTS SEG NFR BLD: 61 % (ref 32–75)
NRBC # BLD: 0 K/UL (ref 0–0.01)
NRBC BLD-RTO: 0 PER 100 WBC
PLATELET # BLD AUTO: 309 K/UL (ref 150–400)
PMV BLD AUTO: 9.5 FL (ref 8.9–12.9)
POTASSIUM SERPL-SCNC: 3.2 MMOL/L (ref 3.5–5.1)
PROT SERPL-MCNC: 6.2 G/DL (ref 6.4–8.2)
RBC # BLD AUTO: 3.77 M/UL (ref 3.8–5.2)
RBC MORPH BLD: ABNORMAL
SERVICE CMNT-IMP: NORMAL
SODIUM SERPL-SCNC: 139 MMOL/L (ref 136–145)
WBC # BLD AUTO: 9.3 K/UL (ref 3.6–11)

## 2019-05-24 PROCEDURE — 94760 N-INVAS EAR/PLS OXIMETRY 1: CPT

## 2019-05-24 PROCEDURE — 85025 COMPLETE CBC W/AUTO DIFF WBC: CPT

## 2019-05-24 PROCEDURE — 36415 COLL VENOUS BLD VENIPUNCTURE: CPT

## 2019-05-24 PROCEDURE — 82962 GLUCOSE BLOOD TEST: CPT

## 2019-05-24 PROCEDURE — 74011250636 HC RX REV CODE- 250/636: Performed by: INTERNAL MEDICINE

## 2019-05-24 PROCEDURE — 74011250637 HC RX REV CODE- 250/637: Performed by: INTERNAL MEDICINE

## 2019-05-24 PROCEDURE — 74011250637 HC RX REV CODE- 250/637: Performed by: FAMILY MEDICINE

## 2019-05-24 PROCEDURE — 80053 COMPREHEN METABOLIC PANEL: CPT

## 2019-05-24 RX ORDER — PANTOPRAZOLE SODIUM 40 MG/1
40 TABLET, DELAYED RELEASE ORAL
Qty: 30 TAB | Refills: 0 | Status: ON HOLD
Start: 2019-05-24 | End: 2019-07-13 | Stop reason: CLARIF

## 2019-05-24 RX ORDER — METOPROLOL TARTRATE 25 MG/1
12.5 TABLET, FILM COATED ORAL EVERY 12 HOURS
Qty: 30 TAB | Refills: 0 | Status: ON HOLD
Start: 2019-05-24 | End: 2019-07-13 | Stop reason: CLARIF

## 2019-05-24 RX ORDER — HYDROCODONE BITARTRATE AND ACETAMINOPHEN 5; 325 MG/1; MG/1
1 TABLET ORAL
Qty: 6 TAB | Refills: 0 | Status: SHIPPED | OUTPATIENT
Start: 2019-05-24 | End: 2019-05-27

## 2019-05-24 RX ORDER — POTASSIUM CHLORIDE 750 MG/1
20 TABLET, FILM COATED, EXTENDED RELEASE ORAL 2 TIMES DAILY
Status: DISCONTINUED | OUTPATIENT
Start: 2019-05-24 | End: 2019-05-24 | Stop reason: HOSPADM

## 2019-05-24 RX ORDER — CLINDAMYCIN HYDROCHLORIDE 300 MG/1
300 CAPSULE ORAL 4 TIMES DAILY
Qty: 32 CAP | Refills: 0 | Status: ON HOLD
Start: 2019-05-24 | End: 2019-07-13 | Stop reason: CLARIF

## 2019-05-24 RX ORDER — POTASSIUM CHLORIDE 1500 MG/1
20 TABLET, FILM COATED, EXTENDED RELEASE ORAL 2 TIMES DAILY
Qty: 60 TAB | Refills: 0 | Status: ON HOLD
Start: 2019-05-24 | End: 2019-07-13 | Stop reason: CLARIF

## 2019-05-24 RX ADMIN — VITAMIN D, TAB 1000IU (100/BT) 1000 UNITS: 25 TAB at 08:02

## 2019-05-24 RX ADMIN — Medication 10 ML: at 05:57

## 2019-05-24 RX ADMIN — Medication 1 CAPSULE: at 08:02

## 2019-05-24 RX ADMIN — CLINDAMYCIN PHOSPHATE 600 MG: 600 INJECTION, SOLUTION INTRAVENOUS at 05:56

## 2019-05-24 RX ADMIN — CLOPIDOGREL 75 MG: 75 TABLET, FILM COATED ORAL at 08:02

## 2019-05-24 RX ADMIN — POTASSIUM CHLORIDE 20 MEQ: 750 TABLET, FILM COATED, EXTENDED RELEASE ORAL at 08:03

## 2019-05-24 RX ADMIN — APIXABAN 5 MG: 5 TABLET, FILM COATED ORAL at 08:02

## 2019-05-24 RX ADMIN — METOPROLOL TARTRATE 12.5 MG: 25 TABLET ORAL at 08:02

## 2019-05-24 RX ADMIN — DULOXETINE HYDROCHLORIDE 60 MG: 30 CAPSULE, DELAYED RELEASE ORAL at 08:02

## 2019-05-24 RX ADMIN — HYDROCODONE BITARTRATE AND ACETAMINOPHEN 1 TABLET: 5; 325 TABLET ORAL at 07:55

## 2019-05-24 NOTE — PROGRESS NOTES
Daily Progress Note: 5/24/2019 Benedict Olsen MD 
 
Assessment/Plan:  
Cellulitis of right upper arm (5/17/2019): infected and purulent recent surgical wounds. --Started IV cefepime and Vancomycin - changed to Daptomycin 5/20 per ID - change to Clinda outpt through 6/5 
--Pain control. Wound care consulted --Blood culture no growth to date 
--Wound culture +MRSA - sensitive to Clinda --Vascular Surg and ID consulted 
  
Severe sepsis due to cellulitis POA: she had SIRS criteria with an elevated lactate in the setting of cellulitis. --lactic acid normalized 
  
Type II diabetes mellitus ([facility] [address] (10/9/2015) / Type 2 diabetes with nephropathy ([facility] (10/1/2018): 
--A1c at 7.7% BS controlled 
  
Hypokalemia:replace as needed 
  
Coronary artery disease involving native coronary artery without angina pectoris (1/22/2016):  
--stable 
  
Essential hypertension (1/22/2016): BP stable. 
  
Recurrent deep vein thrombosis (DVT) ([facility] (3/30/2018) / Chronic anticoagulation (3/30/2018):  
--resumed Eliquis 
  
Gangrene of finger of right hand ([facility] (5/17/2019): involving right index, mid and ring fingers. --ortho has seen, no acute need for surgery at this time - will need partial amputations at some point 
  
Obesity (BMI 30-39.9) (11/13/2018): nutritional consult 
  
Atrial fibrillation ([facility] (11/16/2018):  
--HR up without bblocker, will re-start metoprolol at low dose - watch for hypotension 
  
Mild intermittent asthma (5/17/2019): not wheezing. Nebs as needed DVT proph - on eliquis Problem List: 
Problem List as of 5/24/2019 Date Reviewed: 4/26/2019 Codes Class Noted - Resolved * (Principal) Cellulitis of right upper arm ICD-10-CM: L03.113 ICD-9-CM: 682.3  5/17/2019 - Present Mild intermittent asthma ICD-10-CM: J45.20 ICD-9-CM: 493.90  5/17/2019 - Present Gangrene of finger of right hand [facility]) ICD-10-CM: M02 
ICD-9-CM: 785.4  5/17/2019 - Present Brachial artery thrombus (HCC) ICD-10-CM: S58.7 ICD-9-CM: 444.21  4/26/2019 - Present Bowel obstruction ([facility] ICD-10-CM: E77.848 ICD-9-CM: 560.9  1/8/2019 - Present Partial small bowel obstruction ([facility] ICD-10-CM: K56.600 ICD-9-CM: 560.9  1/5/2019 - Present Sleep apnea ICD-10-CM: G47.30 ICD-9-CM: 780.57  1/5/2019 - Present Overview Signed 1/5/2019  8:41 PM by Sandra Flash, DO  
  wears CPAP Atrial fibrillation [facility]) ICD-10-CM: I48.91 
ICD-9-CM: 427.31  11/16/2018 - Present Dyspnea ICD-10-CM: R06.00 
ICD-9-CM: 786.09  11/13/2018 - Present ARF (acute renal failure) (HCC) ICD-10-CM: N17.9 ICD-9-CM: 584.9  11/13/2018 - Present Obesity (BMI 30-39.9) (Chronic) ICD-10-CM: L19.0 ICD-9-CM: 278.00  11/13/2018 - Present Closed wedge compression fracture of T7 vertebra ([facility] ICD-10-CM: V06.695P ICD-9-CM: 805.2  11/13/2018 - Present Type 2 diabetes with nephropathy ([facility] ICD-10-CM: E11.21 
ICD-9-CM: 250.40, 583.81  10/1/2018 - Present Chest pain ICD-10-CM: R07.9 ICD-9-CM: 786.50  6/27/2018 - Present Generalized abdominal pain ICD-10-CM: R10.84 ICD-9-CM: 789.07  6/27/2018 - Present Recurrent deep vein thrombosis (DVT) (HCC) ICD-10-CM: I82.409 ICD-9-CM: 453.40  3/30/2018 - Present Chronic anticoagulation (Chronic) ICD-10-CM: Z79.01 
ICD-9-CM: V58.61  3/30/2018 - Present Coronary artery disease involving native coronary artery without angina pectoris (Chronic) ICD-10-CM: I25.10 ICD-9-CM: 414.01  1/22/2016 - Present Essential hypertension (Chronic) ICD-10-CM: I10 
ICD-9-CM: 401.9  1/22/2016 - Present Type II diabetes mellitus (HCC) (Chronic) ICD-10-CM: E11.9 ICD-9-CM: 250.00  10/9/2015 - Present NSTEMI (non-ST elevated myocardial infarction) ([facility] ICD-10-CM: I21.4 ICD-9-CM: 410.70  10/9/2015 - Present RESOLVED: CAD (coronary artery disease) ICD-10-CM: I25.10 ICD-9-CM: 414.00  10/9/2015 - 1/22/2016 RESOLVED: HTN (hypertension) ICD-10-CM: I10 
ICD-9-CM: 401.9  10/9/2015 - 1/22/2016 Subjective:  
H&P: Ms. Echo Martinez is a 72 y.o. female who is being admitted for cellulitis of right upper arm. Ms. Echo Martinez presented to our Emergency Department today complaining of a progressive swelling, redness and discharge from surgery wounds following a thrombectomy of right arm including brachial, radial and ulnar arteries with wrist exploration done recently. She denies any fever or chills. Pain seems worse with movement but stable when arm is rested. She also has gangrene three finger right hand for which she was to have amputations next week. She will be admitted for further management. 5/18: No acute events overnight. Slept with CPAP. She has pain but it is currently controlled. Denies CP, SOB. 
 
5/19: No acute events overnight. Pain now controlled this AM.  Denies CP, SOB, dizziness. 5/20:  No new complaints. Little pain currently. Continues on Vanc/Maxipime. ID and Vasc surg consulted. 5/21:  C/O pain right arm/hand. Bedside I&D of antecubital site done by Vascular Surg yesterday with purulent DC obtained and may need washout. Wound cultures with MRSA. Blood cultures remain neg. ID changed antibiotic to Daptomycin. K+ low - replacing. CT right UE results pending.  
 
5/22:  Less pain right hand/fingers/arm at this moment. K+ sl low-replacing.   
 
5/23:  She reports she is improving and has less pain and swelling. She does not think she will need rehab.  K+ still low - replacing. 
 
5/24:  Continues to feel better. K+ still low - replacing. Discussed at length with her regarding care of her wounds/dressing needs/risk of infection of wounds and now thinks she cannot handle taking care of wounds and ADLs on her own - now agrees to consider [facility] for assistance with care. Review of Systems: A comprehensive review of systems was negative except for that written in the HPI. Objective:  
Physical Exam:  
Visit Vitals /54 (BP 1 Location: Left arm, BP Patient Position: At rest) Pulse 98 Temp 98.3 °F (36.8 °C) Resp 16 Ht 5' 7\" (1.702 m) Wt 105.3 kg (232 lb 1.6 oz) SpO2 97% Breastfeeding? No  
BMI 36.35 kg/m² O2 Device: Room air Temp (24hrs), Av.3 °F (36.8 °C), Min:97.9 °F (36.6 °C), Max:98.5 °F (36.9 °C) No intake/output data recorded. No intake/output data recorded. General:  Alert, cooperative, no distress, appears stated age, obese Head:  Normocephalic, without obvious abnormality, atraumatic. Eyes:  Conjunctivae/corneas clear. PERRL, EOMs intact. Throat: Lips, mucosa, and tongue moist.. Neck: Supple, symmetrical, trachea midline, no adenopathy. Lungs:   Clear to auscultation bilaterally. Heart:  Irregularly irregular, S1, S2 normal, no murmur, click, rub or gallop. Abdomen:   Soft, non-tender. Bowel sounds normal. No masses,  No organomegaly. Extremities: LE trace edema. Dry gangre tips of 2nd/3rd fingers right hand and mid portion on 4th finger right hand. swelling of dorsum of right hand looks better. Antecubital area looks better. Skin: Right forearm incision dressed, dressing dry. Neurologic: CNII-XII intact. Alert and oriented X 3. Data Review:  
   
Recent Days: 
Recent Labs  
  19 
0606 19 
0415 19 
6191 WBC 9.3 10.2 8.9 HGB 9.0* 9.2* 9.5* HCT 32.6* 32.6* 33.5*  
 327 307 Recent Labs  
  19 
0606 19 
0415 19 
6648  138 138  
K 3.2* 3.0* 3.2*  
 109* 108 CO2  GLU 90 100 109* BUN 9 8 7 CREA 1.03* 0.85 0.83 CA 9.1 9.1 9.0 ALB 2.3* 2.2* 2.2*  
SGOT 21 23 23 ALT 22 24 25 No results for input(s): PH, PCO2, PO2, HCO3, FIO2 in the last 72 hours. 24 Hour Results: 
Recent Results (from the past 24 hour(s)) GLUCOSE, POC  
 Collection Time: 05/23/19 12:42 PM  
Result Value Ref Range Glucose (POC) 92 65 - 100 mg/dL Performed by Jesus Vasquez (PCT) GLUCOSE, POC Collection Time: 05/23/19  4:00 PM  
Result Value Ref Range Glucose (POC) 80 65 - 100 mg/dL Performed by Jesus Vasquez (PCT) GLUCOSE, POC Collection Time: 05/23/19  9:12 PM  
Result Value Ref Range Glucose (POC) 108 (H) 65 - 100 mg/dL Performed by Esperanza Persaud (PCT) CBC WITH AUTOMATED DIFF Collection Time: 05/24/19  6:06 AM  
Result Value Ref Range WBC 9.3 3.6 - 11.0 K/uL  
 RBC 3.77 (L) 3.80 - 5.20 M/uL HGB 9.0 (L) 11.5 - 16.0 g/dL HCT 32.6 (L) 35.0 - 47.0 % MCV 86.5 80.0 - 99.0 FL  
 MCH 23.9 (L) 26.0 - 34.0 PG  
 MCHC 27.6 (L) 30.0 - 36.5 g/dL  
 RDW 16.7 (H) 11.5 - 14.5 % PLATELET 932 183 - 754 K/uL MPV 9.5 8.9 - 12.9 FL  
 NRBC 0.0 0  WBC ABSOLUTE NRBC 0.00 0.00 - 0.01 K/uL NEUTROPHILS PENDING % LYMPHOCYTES PENDING % MONOCYTES PENDING % EOSINOPHILS PENDING % BASOPHILS PENDING % IMMATURE GRANULOCYTES PENDING %  
 ABS. NEUTROPHILS PENDING K/UL  
 ABS. LYMPHOCYTES PENDING K/UL  
 ABS. MONOCYTES PENDING K/UL  
 ABS. EOSINOPHILS PENDING K/UL  
 ABS. BASOPHILS PENDING K/UL  
 ABS. IMM. GRANS. PENDING K/UL  
 DF PENDING   
METABOLIC PANEL, COMPREHENSIVE Collection Time: 05/24/19  6:06 AM  
Result Value Ref Range Sodium 139 136 - 145 mmol/L Potassium 3.2 (L) 3.5 - 5.1 mmol/L Chloride 108 97 - 108 mmol/L  
 CO2 23 21 - 32 mmol/L Anion gap 8 5 - 15 mmol/L Glucose 90 65 - 100 mg/dL BUN 9 6 - 20 MG/DL Creatinine 1.03 (H) 0.55 - 1.02 MG/DL  
 BUN/Creatinine ratio 9 (L) 12 - 20 GFR est AA >60 >60 ml/min/1.73m2 GFR est non-AA 53 (L) >60 ml/min/1.73m2 Calcium 9.1 8.5 - 10.1 MG/DL Bilirubin, total 0.3 0.2 - 1.0 MG/DL  
 ALT (SGPT) 22 12 - 78 U/L  
 AST (SGOT) 21 15 - 37 U/L Alk. phosphatase 165 (H) 45 - 117 U/L Protein, total 6.2 (L) 6.4 - 8.2 g/dL Albumin 2.3 (L) 3.5 - 5.0 g/dL Globulin 3.9 2.0 - 4.0 g/dL A-G Ratio 0.6 (L) 1.1 - 2.2 GLUCOSE, POC Collection Time: 05/24/19  6:37 AM  
Result Value Ref Range Glucose (POC) 84 65 - 100 mg/dL Performed by Julio César Guillermo (PCT) Medications reviewed Current Facility-Administered Medications Medication Dose Route Frequency  potassium chloride (KLOR-CON) packet for solution 40 mEq  40 mEq Oral BID WITH MEALS  clindamycin (CLEOCIN) 600mg NS 50 mL IVPB (premix)  600 mg IntraVENous Q6H  
 HYDROcodone-acetaminophen (NORCO) 5-325 mg per tablet 1 Tab  1 Tab Oral Q6H PRN  potassium chloride SR (KLOR-CON 10) tablet 20 mEq  20 mEq Oral BID  pantoprazole (PROTONIX) tablet 40 mg  40 mg Oral ACD  metoprolol tartrate (LOPRESSOR) tablet 12.5 mg  12.5 mg Oral Q12H  
 morphine injection 1 mg  1 mg IntraVENous Q4H PRN  
 sodium chloride (NS) flush 5-40 mL  5-40 mL IntraVENous Q8H  
 sodium chloride (NS) flush 5-40 mL  5-40 mL IntraVENous PRN  
 acetaminophen (TYLENOL) tablet 650 mg  650 mg Oral Q4H PRN  
 naloxone (NARCAN) injection 0.4 mg  0.4 mg IntraVENous PRN  
 ondansetron (ZOFRAN) injection 4 mg  4 mg IntraVENous Q4H PRN  
 sodium chloride (NS) flush 5-10 mL  5-10 mL IntraVENous PRN  
 insulin lispro (HUMALOG) injection   SubCUTAneous AC&HS  
 glucose chewable tablet 16 g  4 Tab Oral PRN  
 dextrose (D50W) injection syrg 12.5-25 g  12.5-25 g IntraVENous PRN  
 glucagon (GLUCAGEN) injection 1 mg  1 mg IntraMUSCular PRN  
 apixaban (ELIQUIS) tablet 5 mg  5 mg Oral BID  cholecalciferol (VITAMIN D3) tablet 1,000 Units  1,000 Units Oral DAILY  clopidogrel (PLAVIX) tablet 75 mg  75 mg Oral DAILY  DULoxetine (CYMBALTA) capsule 60 mg  60 mg Oral DAILY  lactobac ac& pc-s.therm-b.anim (NUBIA Q/RISAQUAD)  1 Cap Oral DAILY  mirtazapine (REMERON) tablet 15 mg  15 mg Oral QHS  montelukast (SINGULAIR) tablet 10 mg  10 mg Oral QHS  sAXagliptin (ONGLYZA) tablet 5 mg  (Patient Supplied)  5 mg Oral DAILY  traMADol (ULTRAM) tablet 50 mg  50 mg Oral Q8H PRN Care Plan discussed with: Patient/nurse/ID Total time spent with patient: 30 minutes.  
 
Itzel Carbajal MD

## 2019-05-24 NOTE — PROGRESS NOTES
[facility] Progress Note Trino Buitrago DO 
  [phone] Office [phone]  Fax 2019 Assessment & Plan: 1. Right upper extremity cellulitis, status post recent thrombectomy involving the brachial, radial and ulnar arteries with wrist exploration on 2019. Cultures are growing MRSA. D/w Vascular Surgery. Plan d/c on clinda 300mg po q 6 hours through 19. Patient to f/u with vascular surgery as outpatient. Discussed potential side effects of abx. Patient to contact me with problems 2. Right hand dry gangrene involving three fingers. This does not appear to be clinically infected. Continue local wound care. No antibiotic treatment planned. 3. MULTIPLE ANTIBIOTIC ALLERGIES INCLUDING SULFA, TETRACYCLINE AND CIPROFLOXACIN. 4. Malar rash. Question drug-induced subacute cutaneous lupus erythematosus? Improving. Abx classes changed Subjective:  
 
Arm feeling better Objective:  
 
Vitals:  
Visit Vitals /54 (BP 1 Location: Left arm, BP Patient Position: At rest) Pulse 98 Temp 98.3 °F (36.8 °C) Resp 16 Ht 5' 7\" (1.702 m) Wt 105.3 kg (232 lb 1.6 oz) SpO2 97% Breastfeeding? No  
BMI 36.35 kg/m² Tmax:  Temp (24hrs), Av.3 °F (36.8 °C), Min:97.9 °F (36.6 °C), Max:98.5 °F (36.9 °C) Exam:  
Patient is intubated:  no 
 
Physical Examination:  
General:  Alert, cooperative, no distress Head:  Normocephalic, atraumatic. Eyes:  Conjunctivae clear Neck: Supple Lungs:   No distress. Chest wall:    
Heart:    
Abdomen:     
Extremities: RUE dressed Skin: Malar rash fading Neurologic: CNII-XII intact. Labs:     
 
No lab exists for component: ITNL No results for input(s): CPK, CKMB, TROIQ in the last 72 hours. Recent Labs  
  19 
0606 19 
0415 19 
6764  138 138  
K 3.2* 3.0* 3.2*  
 109* 108 CO2  23 BUN 9 8 7 CREA 1.03* 0.85 0.83 GLU 90 100 109* ALB 2.3* 2.2* 2.2* WBC 9.3 10.2 8.9 HGB 9.0* 9.2* 9.5* HCT 32.6* 32.6* 33.5*  
 327 307 No results for input(s): INR, PTP, APTT in the last 72 hours. No lab exists for component: INREXT, INREXT Needs: urine analysis, urine sodium, protein and creatinine No results found for: [place] Cultures: No results found for: CANDIS Lab Results Component Value Date/Time Culture result:  05/22/2019 12:55 PM  
  NO ROUTINE ENTERIC PATHOGENS ISOLATED INCLUDING SALMONELLA, SHIGELLA, YERSINIA, VIBRIO OR SHIGA TOXIN PRODUCING E. COLI Culture result: (A) 05/17/2019 06:34 PM  
  LIGHT **METHICILLIN RESISTANT STAPHYLOCOCCUS AUREUS**  
 Culture result:  05/17/2019 06:34 PM  
  CALLED TO AND READ BACK BY 
BLADIMIR Ivey AT 14:40 ON 5/18/19 BY [facility]. Culture result: NO GROWTH 6 DAYS 05/17/2019 06:34 PM  
 
 
Radiology:  
 
Medications Current Facility-Administered Medications Medication Dose Route Frequency Last Dose  potassium chloride SR (KLOR-CON 10) tablet 20 mEq  20 mEq Oral BID  clindamycin (CLEOCIN) 600mg NS 50 mL IVPB (premix)  600 mg IntraVENous Q6H 600 mg at 05/24/19 0556  
 HYDROcodone-acetaminophen (NORCO) 5-325 mg per tablet 1 Tab  1 Tab Oral Q6H PRN 1 Tab at 05/24/19 4200  pantoprazole (PROTONIX) tablet 40 mg  40 mg Oral ACD 40 mg at 05/23/19 1619  
 metoprolol tartrate (LOPRESSOR) tablet 12.5 mg  12.5 mg Oral Q12H 12.5 mg at 05/24/19 6769  morphine injection 1 mg  1 mg IntraVENous Q4H PRN 1 mg at 05/18/19 0457  sodium chloride (NS) flush 5-40 mL  5-40 mL IntraVENous Q8H 10 mL at 05/24/19 0557  sodium chloride (NS) flush 5-40 mL  5-40 mL IntraVENous PRN    
 acetaminophen (TYLENOL) tablet 650 mg  650 mg Oral Q4H  mg at 05/18/19 0223  
 naloxone (NARCAN) injection 0.4 mg  0.4 mg IntraVENous PRN    
 ondansetron (ZOFRAN) injection 4 mg  4 mg IntraVENous Q4H PRN 4 mg at 05/22/19 2234  sodium chloride (NS) flush 5-10 mL  5-10 mL IntraVENous PRN    
 insulin lispro (HUMALOG) injection   SubCUTAneous AC&HS Stopped at 05/17/19 2200  
 glucose chewable tablet 16 g  4 Tab Oral PRN    
 dextrose (D50W) injection syrg 12.5-25 g  12.5-25 g IntraVENous PRN    
 glucagon (GLUCAGEN) injection 1 mg  1 mg IntraMUSCular PRN    
 apixaban (ELIQUIS) tablet 5 mg  5 mg Oral BID 5 mg at 05/24/19 0802  cholecalciferol (VITAMIN D3) tablet 1,000 Units  1,000 Units Oral DAILY 1,000 Units at 05/24/19 1701  clopidogrel (PLAVIX) tablet 75 mg  75 mg Oral DAILY 75 mg at 05/24/19 5369  DULoxetine (CYMBALTA) capsule 60 mg  60 mg Oral DAILY 60 mg at 05/24/19 2936  lactobac ac& pc-s.therm-b.anim (NUBIA Q/RISAQUAD)  1 Cap Oral DAILY 1 Cap at 05/24/19 9412  mirtazapine (REMERON) tablet 15 mg  15 mg Oral QHS 15 mg at 05/23/19 2152  montelukast (SINGULAIR) tablet 10 mg  10 mg Oral QHS 10 mg at 05/23/19 2152  sAXagliptin (ONGLYZA) tablet 5 mg  (Patient Supplied)  5 mg Oral DAILY Stopped at 05/22/19 0900  
 traMADol (ULTRAM) tablet 50 mg  50 mg Oral Q8H PRN 50 mg at 05/23/19 1619 Case discussed with: 
 
 
Chava Lau DO

## 2019-05-24 NOTE — PROGRESS NOTES
5/24/2019 11:12 AM Order for home health and discharge summary sent to [facility] via [employer]. 5/24/2019 10:15 AM Met with pt who is requesting not to go to SNF but have home health and return to IDL at [facility]. Pt signed choice letter for [facility]. CM called [facility] spoke with Mariposa who confirmed they can provide daily woundcare for pt for 10 days and will reassess after 10 days and abx are completed. CM called and confirmed with pt's attending pt can discharge home. Pt's pain medication was only written for 2 days, pt needing PCP follow up Monday. CM called Mr. Elwin Boxer office, appt not available Monday because of the holiday. Appt available Tuesday, at [facility] made and added to avs. Pt is aware and agreeable for appt. Awaiting home health order from pt's MD. Pt reported her daughter will transport her home today and she will call her to arrange time. CM cancelled SNF referral with [facility]. CM will follow up.  
  
 
5/24/2019 8:01 AM Per MD pt is agreeable to going to SNF at [facility]. Pt is ready for discharge today. Referral sent to [facility] via [employer]. CM called and lvm with admissions. CM will follow up. MYLES Kaminski

## 2019-05-24 NOTE — DISCHARGE INSTRUCTIONS
Patient Discharge Instructions    Elaina Clifton Forge / [id number] : 1945    Admitted 2019 Discharged: 2019 10:59 AM     ACUTE DIAGNOSES:  Cellulitis of right upper arm [L03.113]    CHRONIC MEDICAL DIAGNOSES:  Problem List as of 2019 Date Reviewed: 2019          Codes Class Noted - Resolved    * (Principal) Cellulitis of right upper arm ICD-10-CM: L03.113  ICD-9-CM: 682.3  2019 - Present        Mild intermittent asthma ICD-10-CM: J45.20  ICD-9-CM: 493.90  2019 - Present        Gangrene of finger of right hand ([facility] ICD-10-CM: I96  ICD-9-CM: 785.4  2019 - Present        Brachial artery thrombus (HCC) ICD-10-CM: I74.2  ICD-9-CM: 444.21  2019 - Present        Bowel obstruction ([facility] ICD-10-CM: X81.003  ICD-9-CM: 560.9  2019 - Present        Partial small bowel obstruction ([facility] ICD-10-CM: K56.600  ICD-9-CM: 560.9  2019 - Present        Sleep apnea ICD-10-CM: G47.30  ICD-9-CM: 780.57  2019 - Present    Overview Signed 2019  8:41 PM by Angel Luis Schmitz, DO     wears CPAP             Atrial fibrillation ([facility] ICD-10-CM: I48.91  ICD-9-CM: 427.31  2018 - Present        Dyspnea ICD-10-CM: R06.00  ICD-9-CM: 786.09  2018 - Present        ARF (acute renal failure) ([facility] ICD-10-CM: N17.9  ICD-9-CM: 584.9  2018 - Present        Obesity (BMI 30-39.9) (Chronic) ICD-10-CM: E66.9  ICD-9-CM: 278.00  2018 - Present        Closed wedge compression fracture of T7 vertebra ([facility] ICD-10-CM: P97.468U  ICD-9-CM: 805.2  2018 - Present        Type 2 diabetes with nephropathy ([facility] ICD-10-CM: E11.21  ICD-9-CM: 250.40, 583.81  10/1/2018 - Present        Chest pain ICD-10-CM: R07.9  ICD-9-CM: 786.50  2018 - Present        Generalized abdominal pain ICD-10-CM: R10.84  ICD-9-CM: 789.07  2018 - Present        Recurrent deep vein thrombosis (DVT) ([facility] ICD-10-CM: I82.409  ICD-9-CM: 453.40  3/30/2018 - Present        Chronic anticoagulation (Chronic) ICD-10-CM: Z79.01  ICD-9-CM: V58.61  3/30/2018 - Present        Coronary artery disease involving native coronary artery without angina pectoris (Chronic) ICD-10-CM: I25.10  ICD-9-CM: 414.01  1/22/2016 - Present        Essential hypertension (Chronic) ICD-10-CM: I10  ICD-9-CM: 401.9  1/22/2016 - Present        Type II diabetes mellitus ([facility] (Chronic) ICD-10-CM: E11.9  ICD-9-CM: 250.00  10/9/2015 - Present        NSTEMI (non-ST elevated myocardial infarction) ([facility].) ICD-10-CM: I21.4  ICD-9-CM: 410.70  10/9/2015 - Present        RESOLVED: CAD (coronary artery disease) ICD-10-CM: I25.10  ICD-9-CM: 414.00  10/9/2015 - 1/22/2016        RESOLVED: HTN (hypertension) ICD-10-CM: I10  ICD-9-CM: 401.9  10/9/2015 - 1/22/2016              DISCHARGE MEDICATIONS:         · It is important that you take the medication exactly as they are prescribed. · Keep your medication in the bottles provided by the pharmacist and keep a list of the medication names, dosages, and times to be taken in your wallet. · Do not take other medications without consulting your doctor. DIET:  Resume previous diet    ACTIVITY: Activity as tolerated    ADDITIONAL INFORMATION: If you experience any of the following symptoms then please call your primary care physician or return to the emergency room if you cannot get hold of your doctor: Fever, chills, nausea, vomiting, diarrhea, change in mentation, falling, bleeding, shortness of breath. FOLLOW UP CARE:  Dr. López Peng MD  you are to call and set up an appointment to see them with in 1 week. Follow-up with specialists at directed by them      Information obtained by :  I understand that if any problems occur once I am at home I am to contact my physician. I understand and acknowledge receipt of the instructions indicated above. Physician's or R.N.'s Signature                                                                  Date/Time                                                                                                                                              Patient or Representative Signature                                                          Date/Time

## 2019-05-24 NOTE — DISCHARGE SUMMARY
Physician Discharge Summary Patient ID:   
Amy Daniel [id number] 
40 y.o. 
1945 Luba Alcantara MD 
 
Admit date: 5/17/2019 Discharge date and time: 5/24/2019 Admission Diagnoses: Cellulitis of right upper arm [E82.119] Chronic Diagnoses:   
Problem List as of 5/24/2019 Date Reviewed: 4/26/2019 Codes Class Noted - Resolved * (Principal) Cellulitis of right upper arm ICD-10-CM: L03.113 ICD-9-CM: 682.3  5/17/2019 - Present Mild intermittent asthma ICD-10-CM: J45.20 ICD-9-CM: 493.90  5/17/2019 - Present Gangrene of finger of right hand [facility]) ICD-10-CM: K36 
ICD-9-CM: 785.4  5/17/2019 - Present Brachial artery thrombus (HCC) ICD-10-CM: E86.8 ICD-9-CM: 444.21  4/26/2019 - Present Bowel obstruction ([facility] ICD-10-CM: F03.363 ICD-9-CM: 560.9  1/8/2019 - Present Partial small bowel obstruction ([facility] ICD-10-CM: K56.600 ICD-9-CM: 560.9  1/5/2019 - Present Sleep apnea ICD-10-CM: G47.30 ICD-9-CM: 780.57  1/5/2019 - Present Overview Signed 1/5/2019  8:41 PM by Francy Rawls DO  
  wears CPAP Atrial fibrillation [facility]) ICD-10-CM: I48.91 
ICD-9-CM: 427.31  11/16/2018 - Present Dyspnea ICD-10-CM: R06.00 
ICD-9-CM: 786.09  11/13/2018 - Present ARF (acute renal failure) (HCC) ICD-10-CM: N17.9 ICD-9-CM: 584.9  11/13/2018 - Present Obesity (BMI 30-39.9) (Chronic) ICD-10-CM: D77.3 ICD-9-CM: 278.00  11/13/2018 - Present Closed wedge compression fracture of T7 vertebra ([facility] ICD-10-CM: W90.257I ICD-9-CM: 805.2  11/13/2018 - Present Type 2 diabetes with nephropathy ([facility] ICD-10-CM: E11.21 
ICD-9-CM: 250.40, 583.81  10/1/2018 - Present Chest pain ICD-10-CM: R07.9 ICD-9-CM: 786.50  6/27/2018 - Present Generalized abdominal pain ICD-10-CM: R10.84 ICD-9-CM: 789.07  6/27/2018 - Present Recurrent deep vein thrombosis (DVT) (HCC) ICD-10-CM: I82.409 ICD-9-CM: 453.40  3/30/2018 - Present Chronic anticoagulation (Chronic) ICD-10-CM: Z79.01 
ICD-9-CM: V58.61  3/30/2018 - Present Coronary artery disease involving native coronary artery without angina pectoris (Chronic) ICD-10-CM: I25.10 ICD-9-CM: 414.01  1/22/2016 - Present Essential hypertension (Chronic) ICD-10-CM: I10 
ICD-9-CM: 401.9  1/22/2016 - Present Type II diabetes mellitus (HCC) (Chronic) ICD-10-CM: E11.9 ICD-9-CM: 250.00  10/9/2015 - Present NSTEMI (non-ST elevated myocardial infarction) ([facility] ICD-10-CM: I21.4 ICD-9-CM: 410.70  10/9/2015 - Present RESOLVED: CAD (coronary artery disease) ICD-10-CM: I25.10 ICD-9-CM: 414.00  10/9/2015 - 1/22/2016 RESOLVED: HTN (hypertension) ICD-10-CM: I10 
ICD-9-CM: 401.9  10/9/2015 - 1/22/2016 Discharge Medications:  
Current Discharge Medication List  
  
START taking these medications Details  
clindamycin (CLEOCIN) 300 mg capsule Take 1 Cap by mouth four (4) times daily. Qty: 32 Cap, Refills: 0 HYDROcodone-acetaminophen (NORCO) 5-325 mg per tablet Take 1 Tab by mouth every six (6) hours as needed for Pain for up to 3 days. Max Daily Amount: 4 Tabs. Qty: 6 Tab, Refills: 0 Associated Diagnoses: Cellulitis of right upper extremity; Gangrene of finger of right hand ([facility] CONTINUE these medications which have CHANGED Details  
apixaban (ELIQUIS) 5 mg tablet Take 1 Tab by mouth two (2) times a day. Qty: 60 Tab, Refills: 0  
  
metoprolol tartrate (LOPRESSOR) 25 mg tablet Take 0.5 Tabs by mouth every twelve (12) hours. Qty: 30 Tab, Refills: 0  
  
pantoprazole (PROTONIX) 40 mg tablet Take 1 Tab by mouth Daily (before dinner). Qty: 30 Tab, Refills: 0  
  
potassium chloride SR (K-TAB) 20 mEq tablet Take 1 Tab by mouth two (2) times a day. Qty: 60 Tab, Refills: 0 CONTINUE these medications which have NOT CHANGED Details Omeprazole delayed release (PRILOSEC D/R) 20 mg tablet Take 20 mg by mouth two (2) times a day. ibandronate (BONIVA) 150 mg tablet Take 150 mg by mouth every thirty (30) days. Last dose 4/1/19  
  
lisinopril (PRINIVIL, ZESTRIL) 5 mg tablet Take 5 mg by mouth daily. promethazine (PHENERGAN) 25 mg tablet Take 25 mg by mouth every eight (8) hours as needed for Nausea (Nausea not relieved by ondansetron). mirtazapine (REMERON) 15 mg tablet Take 15 mg by mouth nightly.  
  
magnesium oxide (MAG-OX) 400 mg tablet Take 1 Tab by mouth daily. Qty: 30 Tab, Refills: 0  
  
ondansetron (ZOFRAN ODT) 4 mg disintegrating tablet Take 1 Tab by mouth every eight (8) hours as needed. Qty: 1 Tab, Refills: 0  
  
tiotropium (SPIRIVA WITH HANDIHALER) 18 mcg inhalation capsule Take 1 Cap by inhalation daily. albuterol (PROVENTIL VENTOLIN) 2.5 mg /3 mL (0.083 %) nebulizer solution 2.5 mg by Nebulization route every six (6) hours as needed for Wheezing or Shortness of Breath.  
  
montelukast (SINGULAIR) 10 mg tablet Take 10 mg by mouth nightly. albuterol (PROAIR HFA) 90 mcg/actuation inhaler Take 1 Puff by inhalation every four (4) hours as needed. clopidogrel (PLAVIX) 75 mg tab Take 75 mg by mouth daily. lactobacillus rhamnosus gg 10 billion cell (CULTURELLE) 10 billion cell capsule Take 1 Cap by mouth daily. acetaminophen (TYLENOL) 325 mg tablet Take 650 mg by mouth every six (6) hours as needed for Pain (Mild pain). biotin 10,000 mcg cap Take 1 Cap by mouth daily. saxagliptin (ONGLYZA) 5 mg tab tablet Take 5 mg by mouth daily. DULoxetine (CYMBALTA) 60 mg capsule Take 60 mg by mouth daily. cholecalciferol (VITAMIN D3) 1,000 unit tablet Take 1,000 Units by mouth daily. azelastine-fluticasone (DYMISTA) 137-50 mcg/spray spry 1 [place] by Nasal route daily. mometasone-formoterol (DULERA) 200-5 mcg/actuation HFA inhaler Take 2 Puffs by inhalation two (2) times a day. STOP taking these medications  
  
 loperamide (IMODIUM) 2 mg capsule Comments:  
Reason for Stopping:   
   
 predniSONE (DELTASONE) 10 mg tablet Comments:  
Reason for Stopping:   
   
 traMADol (ULTRAM) 50 mg tablet Comments:  
Reason for Stopping: Follow up Care: 1. Destinee Briones MD with in 1 weeks Diet:  Resume previous diet Disposition: 
Home and . Advanced Directive: 
 
Discharge Exam: [See today's progress note.] CONSULTATIONS: ID and Vascular Surgery Significant Diagnostic Studies:  
Recent Labs  
  05/24/19 0606 05/23/19 0415 WBC 9.3 10.2 HGB 9.0* 9.2* HCT 32.6* 32.6*  
 327 Recent Labs  
  05/24/19 
0606 05/23/19 0415 05/22/19 
3917  138 138  
K 3.2* 3.0* 3.2*  
 109* 108 CO2 23 24 23 BUN 9 8 7 CREA 1.03* 0.85 0.83 GLU 90 100 109* CA 9.1 9.1 9.0 Recent Labs  
  05/24/19 
0606 05/23/19 0415 05/22/19 
8008 SGOT 21 23 23 ALT 22 24 25 * 174* 179* TBILI 0.3 0.3 0.2 TP 6.2* 6.1* 5.9* ALB 2.3* 2.2* 2.2*  
GLOB 3.9 3.9 3.7 Lab Results Component Value Date/Time Glucose (POC) 84 05/24/2019 06:37 AM  
 Glucose (POC) 108 (H) 05/23/2019 09:12 PM  
 Glucose (POC) 80 05/23/2019 04:00 PM  
 Glucose (POC) 92 05/23/2019 12:42 PM  
 Glucose (POC) 97 05/23/2019 06:45 AM  
 
Lab Results Component Value Date/Time TSH 0.58 11/13/2018 03:26 PM  
 
 
HOSPITAL COURSE & TREATMENT RENDERED:  
Cellulitis of right upper arm (5/17/2019): infected and purulent recent surgical wounds. --Started IV cefepime and Vancomycin - changed to Daptomycin 5/20 per ID - change to Clinda outpt through 6/5 
--Pain control. Wound care consulted --Blood culture no growth to date 
--Wound culture +MRSA - sensitive to Clinda --Vascular Surg and ID consulted 
  
Severe sepsis due to cellulitis POA: she had SIRS criteria with an elevated lactate in the setting of cellulitis.  --lactic acid normalized 
  
 Type II diabetes mellitus ([facility] (10/9/2015) / Type 2 diabetes with nephropathy ([facility] (10/1/2018): 
--A1c at 7.7% BS controlled 
  
Hypokalemia:replace as needed 
  
Coronary artery disease involving native coronary artery without angina pectoris (1/22/2016):  
--stable 
  
Essential hypertension (1/22/2016): BP stable. 
  
Recurrent deep vein thrombosis (DVT) ([facility] (3/30/2018) / Chronic anticoagulation (3/30/2018):  
--resumed Eliquis 
  
Gangrene of finger of right hand ([facility] (5/17/2019): involving right index, mid and ring fingers. --ortho has seen, no acute need for surgery at this time - will need partial amputations at some point 
  
Obesity (BMI 30-39.9) (11/13/2018): nutritional consult 
  
Atrial fibrillation ([facility] (11/16/2018):  
--HR up without bblocker, will re-start metoprolol at low dose - watch for hypotension 
  
Mild intermittent asthma (5/17/2019): not wheezing. Nebs as needed 
  
DVT proph - on eliquis 
   
  
 
Subjective:  
H&P: Ms. Jalloh is a 72 y. o. female who is being admitted for cellulitis of right upper arm. Ms. Jalloh presented to our Emergency Department today complaining of a progressive swelling, redness and discharge from surgery wounds following a thrombectomy of right arm including brachial, radial and ulnar arteries with wrist exploration done recently. She denies any fever or chills. Pain seems worse with movement but stable when arm is rested. She also has gangrene three finger right hand for which she was to have amputations next week. She will be admitted for further management.  
  
5/18: No acute events overnight. Slept with CPAP. She has pain but it is currently controlled. Denies CP, SOB. 
  
5/19: No acute events overnight. Pain now controlled this AM.  Denies CP, SOB, dizziness. 
  
5/20:  No new complaints. Little pain currently. Continues on Vanc/Maxipime.   ID and Vasc surg consulted.  
  
 :  C/O pain right arm/hand. Bedside I&D of antecubital site done by Vascular Surg yesterday with purulent DC obtained and may need washout. Wound cultures with MRSA. Blood cultures remain neg. ID changed antibiotic to Daptomycin. K+ low - replacing. CT right UE results pending.  
  
:  Less pain right hand/fingers/arm at this moment. K+ sl low-replacing.   
  
:  She reports she is improving and has less pain and swelling. She does not think she will need rehab.  K+ still low - replacing. 
  
:  Continues to feel better. K+ still low - replacing. Discussed at length with her regarding care of her wounds/dressing needs/risk of infection of wounds and now thinks she cannot handle taking care of wounds and ADLs on her own - now agrees to consider [facility] for assistance with care.   
  
Review of Systems: A comprehensive review of systems was negative except for that written in the HPI. 
  
Objective:  
Physical Exam:  
Visit Vitals /54 (BP 1 Location: Left arm, BP Patient Position: At rest) Pulse 98 Temp 98.3 °F (36.8 °C) Resp 16 Ht 5' 7\" (1.702 m) Wt 105.3 kg (232 lb 1.6 oz) SpO2 97% Breastfeeding? No  
BMI 36.35 kg/m² O2 Device: Room air Temp (24hrs), Av.3 °F (36.8 °C), Min:97.9 °F (36.6 °C), Max:98.5 °F (36.9 °C) No intake/output data recorded. No intake/output data recorded. General:  Alert, cooperative, no distress, appears stated age, obese Head:  Normocephalic, without obvious abnormality, atraumatic. Eyes:  Conjunctivae/corneas clear. PERRL, EOMs intact. Throat: Lips, mucosa, and tongue moist.. Neck: Supple, symmetrical, trachea midline, no adenopathy. Lungs:   Clear to auscultation bilaterally. Heart:  Irregularly irregular, S1, S2 normal, no murmur, click, rub or gallop. Abdomen:   Soft, non-tender. Bowel sounds normal. No masses,  No organomegaly. Extremities: LE trace edema.   Dry gangre tips of 2nd/3rd fingers right hand and mid portion on 4th finger right hand. swelling of dorsum of right hand looks better. Antecubital area looks better. Skin: Right forearm incision dressed, dressing dry. Neurologic: CNII-XII intact. Alert and oriented X 3.    
 
 
 
 
 
Signed: 
Shaheed Echeverria MD 
5/24/2019 
11:00 AM

## 2019-05-28 NOTE — DISCHARGE SUMMARY
Physician Discharge Summary Patient ID: 
Sp Coughlin [id number] 
89 y.o. 
1945 Admit Date: 4/26/2019 Discharge Date: 5/28/2019 Admission Diagnoses: Brachial artery thrombus ([facility] [I74.2] Discharge Diagnoses: Active Problems: 
  Brachial artery thrombus ([facility] (4/26/2019) Last Procedure: Procedure(s): 
brachial, radial and ulnar thrombectomies of right arm Hospital Course:  
Patient underwent a complex right arm revascularization with exposure of the brachial, radial, ulnar arteries at both the forearm and wrist exposures. Postoperatively. She suffered some finger bluishness at the distal middle 3 finger tips, but the remainder of the hand was significantly improved. Her anticoagulation was changed to Eliquis. She will follow up post-procedure. Consults: none Disposition:home Patient Instructions:  
Cannot display discharge medications since this patient is not currently admitted. Follow-up with Jono Knott MD  in 2 weeks.  
 
 
Signed: 
Jono Knott MD 
5/28/2019 
8:19 AM

## 2019-06-11 NOTE — H&P
CARE TEAM: 
Patient Care Team: 
Ivan Lazcano MD as PCP - General (Family Medicine) 
  
ASSESSMENT: 
1. Gangrene of finger of right hand   
  
  
   
Patient Active Problem List  
Diagnosis  Atrial fibrillation  Bowel obstruction  Brachial artery thrombus  Chronic anticoagulation  ARF (acute renal failure)  Dyspnea  Essential hypertension  Obesity (BMI 30-39. 9)  Sleep apnea  Type 2 diabetes mellitus  COPD (chronic obstructive pulmonary disease)  NSTEMI (non-ST elevated myocardial infarction)  Coronary artery disease involving native coronary artery without angina pectoris  Recurrent deep vein thrombosis (DVT)  
  
  
PLAN: 
Treatment Plan:  I recommend an amputation of index, middle, ring fingers. She is in agreement. Local anesthesia. 
  
   
Orders Placed This Encounter  BMI >=25 PATIENT INSTRUCTIONS & EDUCATION  
 oxyCODONE-acetaminophen (PERCOCET) 5-325 MG per tablet Follow-up: Return for first postoperative visit.  
  
HISTORY OF PRESENT ILLNESS: 
Chief Complaint: Follow-up of the Right Hand Age: 72 y.o. Sex: female History of present illness: [address] a pleasant 72 y.o. female who presents today for evaluation of her right hand. She was seen several weeks ago. She was scheduled for amputations of necrotic digits. In the interim she was admitted to the hospital with wound infections from her vascular surgery. She has been on antibiotics. Her infection has cleared. She is here to reschedule her surgery. Reports persistent severe pain in the hand. 
  
Past medical history, past surgical history, medications, allergies, social history, and review of systems have been reviewed by me. No current facility-administered medications on file prior to encounter. Current Outpatient Medications on File Prior to Encounter Medication Sig Dispense Refill  apixaban (ELIQUIS) 5 mg tablet Take 1 Tab by mouth two (2) times a day. 60 Tab 0  
 metoprolol tartrate (LOPRESSOR) 25 mg tablet Take 0.5 Tabs by mouth every twelve (12) hours. 30 Tab 0  
 pantoprazole (PROTONIX) 40 mg tablet Take 1 Tab by mouth Daily (before dinner). 30 Tab 0  
 potassium chloride SR (K-TAB) 20 mEq tablet Take 1 Tab by mouth two (2) times a day. 60 Tab 0  
 clindamycin (CLEOCIN) 300 mg capsule Take 1 Cap by mouth four (4) times daily. 32 Cap 0  
 Omeprazole delayed release (PRILOSEC D/R) 20 mg tablet Take 20 mg by mouth two (2) times a day.  ibandronate (BONIVA) 150 mg tablet Take 150 mg by mouth every thirty (30) days. Last dose 4/1/19  lisinopril (PRINIVIL, ZESTRIL) 5 mg tablet Take 5 mg by mouth daily.  promethazine (PHENERGAN) 25 mg tablet Take 25 mg by mouth every eight (8) hours as needed for Nausea (Nausea not relieved by ondansetron).  mirtazapine (REMERON) 15 mg tablet Take 15 mg by mouth nightly.  magnesium oxide (MAG-OX) 400 mg tablet Take 1 Tab by mouth daily. 30 Tab 0  
 ondansetron (ZOFRAN ODT) 4 mg disintegrating tablet Take 1 Tab by mouth every eight (8) hours as needed. 1 Tab 0  
 tiotropium (SPIRIVA WITH HANDIHALER) 18 mcg inhalation capsule Take 1 Cap by inhalation daily.  albuterol (PROVENTIL VENTOLIN) 2.5 mg /3 mL (0.083 %) nebulizer solution 2.5 mg by Nebulization route every six (6) hours as needed for Wheezing or Shortness of Breath.  montelukast (SINGULAIR) 10 mg tablet Take 10 mg by mouth nightly.  albuterol (PROAIR HFA) 90 mcg/actuation inhaler Take 1 Puff by inhalation every four (4) hours as needed.  clopidogrel (PLAVIX) 75 mg tab Take 75 mg by mouth daily.  lactobacillus rhamnosus gg 10 billion cell (CULTURELLE) 10 billion cell capsule Take 1 Cap by mouth daily.  acetaminophen (TYLENOL) 325 mg tablet Take 650 mg by mouth every six (6) hours as needed for Pain (Mild pain).  biotin 10,000 mcg cap Take 1 Cap by mouth daily.  saxagliptin (ONGLYZA) 5 mg tab tablet Take 5 mg by mouth daily.  DULoxetine (CYMBALTA) 60 mg capsule Take 60 mg by mouth daily.  cholecalciferol (VITAMIN D3) 1,000 unit tablet Take 1,000 Units by mouth daily.  azelastine-fluticasone (DYMISTA) 137-50 mcg/spray spry 1 Adamant by Nasal route daily.  mometasone-formoterol (DULERA) 200-5 mcg/actuation HFA inhaler Take 2 Puffs by inhalation two (2) times a day. Past Medical History:  
Diagnosis Date  Asthma  Atrial fibrillation ([facility] 11/16/2018  Autoimmune disease ([facility]   
 fibromyalgia  CAD (coronary artery disease) 10/9/2015  Closed wedge compression fracture of T7 vertebra ([facility] 11/13/2018  Diabetes ([facility]  DVT (deep vein thrombosis) in pregnancy ([facility]  GERD (gastroesophageal reflux disease)  HTN (hypertension)  NSTEMI (non-ST elevated myocardial infarction) ([facility] 10/9/2015  Obesity (BMI 30-39.9) 11/13/2018  Sleep apnea   
 wears CPAP Allergies Allergen Reactions  Advair Diskus [Fluticasone Propion-Salmeterol] Rash  Aspartame Other (comments)  Breo Ellipta [Fluticasone Furoate-Vilanterol] Other (comments)  Ciprofloxacin Rash  Statins-Hmg-Coa Reductase Inhibitors Other (comments) Muscle pain Lipitor/crestor/zocor  Sulfa (Sulfonamide Antibiotics) Rash  Tetracycline Other (comments) musclepain  Zetia [Ezetimibe] Myalgia Social History Socioeconomic History  Marital status:  Spouse name: Not on file  Number of children: Not on file  Years of education: Not on file  Highest education level: Not on file Occupational History  Not on file Social Needs  Financial resource strain: Not on file  Food insecurity:  
  Worry: Not on file Inability: Not on file  Transportation needs:  
  Medical: Not on file Non-medical: Not on file Tobacco Use  Smoking status: Former Smoker Last attempt to quit: 3/30/2017 Years since quittin.2  Smokeless tobacco: Never Used Substance and Sexual Activity  Alcohol use: No  
  Alcohol/week: 0.0 oz  
  Comment: very very rarely  Drug use: No  
 Sexual activity: Never Lifestyle  Physical activity:  
  Days per week: Not on file Minutes per session: Not on file  Stress: Not on file Relationships  Social connections:  
  Talks on phone: Not on file Gets together: Not on file Attends Mosque service: Not on file Active member of club or organization: Not on file Attends meetings of clubs or organizations: Not on file Relationship status: Not on file  Intimate partner violence:  
  Fear of current or ex partner: Not on file Emotionally abused: Not on file Physically abused: Not on file Forced sexual activity: Not on file Other Topics Concern  Not on file Social History Narrative  Not on file  
 
 
  
Review of Systems 2019 
  
Constitutional: Unexplained: Negative Genitourinary: Frequent Urination: Negative HEENT: Vision Loss: Negative Neurological: Memory Loss: Negative Integumentary: Rash: Negative Cardiovascular: Palpatations: Negative Hematologic: Bruises/Bleeds Easily: Positive Gastrointestinal: Constipation: Negative Immunological: Seasonal Allergies: Positive Musculoskeletal: Joint Pain: Positive 
  
  
OBJECTIVE: 
Constitutional:  No acute distress. Pleasant and cooperative with exam. 
HEENT: Normocephalic. Atraumatic. Eyes are clear Hearing intact to spoken word Respiratory:  Breathing unlabored. Paulo Plough Cardiovascular:  No marked edema. Psychiatric: Alert.  Affect appropriate. Gait: Normal. 
Musculoskeletal   
She has complete necrosis of portions of the index, middle, ring fingers. No signs of infection. Her wounds on the arm are well-healed

## 2019-06-12 ENCOUNTER — HOSPITAL ENCOUNTER (OUTPATIENT)
Age: 74
Setting detail: OUTPATIENT SURGERY
Discharge: HOME | End: 2019-06-12
Attending: ORTHOPAEDIC SURGERY | Admitting: ORTHOPAEDIC SURGERY
Payer: MEDICARE

## 2019-06-12 VITALS
HEART RATE: 85 BPM | OXYGEN SATURATION: 99 % | HEIGHT: 67 IN | RESPIRATION RATE: 20 BRPM | SYSTOLIC BLOOD PRESSURE: 110 MMHG | DIASTOLIC BLOOD PRESSURE: 52 MMHG | WEIGHT: 231.48 LBS | TEMPERATURE: 97.8 F | BODY MASS INDEX: 36.33 KG/M2

## 2019-06-12 DIAGNOSIS — I96 GANGRENE OF FINGER OF RIGHT HAND (HCC): Primary | ICD-10-CM

## 2019-06-12 LAB
GLUCOSE BLD STRIP.AUTO-MCNC: 147 MG/DL (ref 65–100)
SERVICE CMNT-IMP: ABNORMAL

## 2019-06-12 PROCEDURE — 76030000000 HC AMB SURG OR TIME 0.5 TO 1: Performed by: ORTHOPAEDIC SURGERY

## 2019-06-12 PROCEDURE — 77030002888 HC SUT CHRMC J&J -A: Performed by: ORTHOPAEDIC SURGERY

## 2019-06-12 PROCEDURE — 74011250636 HC RX REV CODE- 250/636: Performed by: ORTHOPAEDIC SURGERY

## 2019-06-12 PROCEDURE — 82962 GLUCOSE BLOOD TEST: CPT

## 2019-06-12 PROCEDURE — 76210000046 HC AMBSU PH II REC FIRST 0.5 HR: Performed by: ORTHOPAEDIC SURGERY

## 2019-06-12 PROCEDURE — 88311 DECALCIFY TISSUE: CPT

## 2019-06-12 PROCEDURE — 88305 TISSUE EXAM BY PATHOLOGIST: CPT

## 2019-06-12 PROCEDURE — 77030018836 HC SOL IRR NACL ICUM -A: Performed by: ORTHOPAEDIC SURGERY

## 2019-06-12 PROCEDURE — 77030013479 HC CUF TRNQ COT DGT MARM -A: Performed by: ORTHOPAEDIC SURGERY

## 2019-06-12 PROCEDURE — 74011000250 HC RX REV CODE- 250: Performed by: ORTHOPAEDIC SURGERY

## 2019-06-12 PROCEDURE — 77030040356 HC CORD BPLR FRCP COVD -A: Performed by: ORTHOPAEDIC SURGERY

## 2019-06-12 RX ORDER — PREDNISONE 10 MG/1
10 TABLET ORAL
Status: ON HOLD | COMMUNITY
End: 2019-07-13 | Stop reason: CLARIF

## 2019-06-12 RX ORDER — OXYCODONE AND ACETAMINOPHEN 5; 325 MG/1; MG/1
1 TABLET ORAL
Qty: 20 TAB | Refills: 0 | Status: SHIPPED | OUTPATIENT
Start: 2019-06-12 | End: 2019-06-15

## 2019-06-12 NOTE — OP NOTES
[address] 
OPERATIVE REPORT Name:  Rafael Linares 
MR#:  [id number] :  1945 ACCOUNT #:  [de-identified] DATE OF SERVICE:  2019 PREOPERATIVE DIAGNOSIS:  Gangrene of right index, middle, ring fingers. POSTOPERATIVE DIAGNOSIS:  Gangrene of right index, middle, ring fingers. PROCEDURE PERFORMED:  Amputation of right index, middle, and ring fingers. SURGEON:  Violetta Walters MD 
 
ASSISTANT: None ANESTHESIA:  Local. 
 
COMPLICATIONS:  None. SPECIMENS REMOVED:  Right index, middle, and ring fingers. IMPLANTS:  None. ESTIMATED BLOOD LOSS:  None. INDICATIONS FOR THE PROCEDURE:  This is a 81-year-old female with significant peripheral vascular disease. Several months ago, she developed an acute ischemic event to the right upper extremity. Vascular Surgery took her to the OR for extensive embolectomies and revascularization of the extremity. She has been on anticoagulation since then. Her fingers developed necrosis of index, middle, and ring. Those have now stabilized and she has well-demarcated areas of necrosis of those digits. She is indicated for operative amputation of the necrotic tissue. I have discussed risks, benefits, potential complications, and alternatives of surgery with her and she has given informed consent to proceed. DESCRIPTION OF THE PROCEDURE:  The patient was identified in the preoperative holding area. Informed consent was obtained. The operative site was marked. Digital blocks were then performed with 0.5% Marcaine and 1% lidocaine. She was then transported to the OR and placed supine on the operating table. All bony prominences were well-padded. The right upper extremity was sterilely prepped and draped in the usual fashion. Surgical time-out was held. The operative site was confirmed. Preoperative antibiotics were not used.   Digital tourniquets were then applied to the index and middle digits. The index finger was found to be necrotic down to the mid aspect of the middle phalanx. The skin was incised. Sharp dissection was performed down to bone. The bone was cut with a bone cutter. Mid axial incisions were then made to expose radial and ulnar digital artery and nerve. These were transected proximally with bipolar cautery and allowed to retract into the wound. The bone was resected with a rongeur to allow for wound closure. The wound was thoroughly irrigated. Skin was then closed with multiple interrupted 5-0 chromic sutures. The middle finger was found to be necrotic down to a level just proximal to the DIP joint. Again, the skin was incised sharply with a 15 blade down to bone. Midaxial incisions were made. The radial and ulnar neurovascular bundles were transected proximally with bipolar cautery and allowed to retract into the wound. The bone was cut with a bone cutter. The bone was then debrided back to healthy bone with a rongeur. This allowed easy wound closure with multiple interrupted 5-0 chromic sutures after thorough wound irrigation. The ring finger had more extensive necrosis down to the level of the MP flexion crease. The digit was amputated sharply with a 15 blade and the entire proximal phalanx was resected sharply with a 15 blade at the level of the MP joint. The skin was then debrided back to healthy bleeding skin. Again, radial and ulnar neurovascular bundles were transected with bipolar cautery and allowed to retract into the hand. The wound was thoroughly irrigated. Skin was closed with 5-0 chromic sutures. The digital tourniquets were removed. Sterile dressings were applied. She was transferred to the PACU in stable condition without complication. Duong Ram MD [id number] 
D:  06/12/2019 8:16 
T:  06/12/2019 8:24 
JOB #:  [id number]

## 2019-06-12 NOTE — BRIEF OP NOTE
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 
AMPUTATION RIGHT INDEX/MIDDLE/RING FINGERS Surgeon(s) and Role: 
   * Marian Roberts MD - Primary Surgical Assistant: none Surgical Staff: 
Circ-1: Torie Paulson RN Local Nurse Monitor: Matthias Paul RN Scrub RN-1: Esther Knox RN Event Time In Time Out Incision Start 0740 Incision Close 0804 Anesthesia: Local  
Estimated Blood Loss: min Specimens:  
ID Type Source Tests Collected by Time Destination 1 : RIGHT INDEX, MIDDLE, AND RING FINGERS Fresh   Marian Roberts MD 6/12/2019 3680 Pathology Findings: necrotic fingers Complications: none Implants: * No implants in log *

## 2019-06-12 NOTE — INTERVAL H&P NOTE
H&P Update: 
Nicolette Chavez was seen and examined. History and physical has been reviewed. The patient has been examined.  There have been no significant clinical changes since the completion of the originally dated History and Physical.

## 2019-06-12 NOTE — DISCHARGE INSTRUCTIONS
Dr. Lomax Matters Upper Extremity Postoperative Instructions      1. Please maintain the dressing placed at surgery until your follow up appointment where it will be removed. Please keep the dressing clean and dry. If you have any questions or problems, please call our office at [phone]. 2. Please elevate the operative extremity to the level of the heart to keep swelling at a minimum. You may get up to move around, but when seated please keep the extremity elevated as much as possible. This will decrease swelling and pain. 3. You were told to be non-weight bearing following surgery. You may do light activities with your right hand. 4. You had a nerve block before surgery. Expect this to wear off around 8-12 hours after you received it. You should start taking your pain medication before the feeling begins to come back into your arm/ hand. 5. A prescription for pain medication is provided. The key to pain control is staying ahead of the pain. For the first 48-72 hours after your surgery, you may want to take your pain medication on a regular schedule. After that, you may only need it on an as needed basis. 6. If you experience any redness, increased pain, increased swelling not relieved by elevation, drainage, fever, or chills, please call the office at [phone], and speak with Kathy Cruz or Jaimie Reyes. Please Follow-up at my office [address] [facility], [address] in 2 weeks unless otherwise directed.

## 2019-06-12 NOTE — ROUTINE PROCESS
Patient: Yue Loza MRN: [id number]  SSN: [id number] YOB: 1945  Age: 72 y.o. Sex: female Patient is status post Procedure(s): 
AMPUTATION RIGHT INDEX/MIDDLE/RING FINGERS. Surgeon(s) and Role: 
   * Darnell Ayala MD - Primary Local/Dose/Irrigation:  SEE MAR Dressing/Packing:  Wound Hand Right-Dressing Type: 4 x 4;Cast padding;Elastic bandage; Xeroform (06/12/19 0803) Splint/Cast:  ] Other:  TOURNICOTS APPLIED TO RIGHT INDEX, MIDDLE, AND RING FINGERS AT 3684; REMOVED AT 0804.

## 2019-06-13 LAB
BACTERIA SPEC CULT: NORMAL
BACTERIA SPEC CULT: NORMAL
SERVICE CMNT-IMP: NORMAL

## 2019-06-28 ENCOUNTER — APPOINTMENT (OUTPATIENT)
Dept: VASCULAR SURGERY | Age: 74
End: 2019-06-28
Attending: EMERGENCY MEDICINE
Payer: MEDICARE

## 2019-06-28 ENCOUNTER — HOSPITAL ENCOUNTER (EMERGENCY)
Age: 74
Discharge: HOME | End: 2019-06-28
Attending: EMERGENCY MEDICINE
Payer: MEDICARE

## 2019-06-28 VITALS
HEART RATE: 104 BPM | DIASTOLIC BLOOD PRESSURE: 35 MMHG | SYSTOLIC BLOOD PRESSURE: 109 MMHG | WEIGHT: 223 LBS | RESPIRATION RATE: 16 BRPM | BODY MASS INDEX: 35 KG/M2 | HEIGHT: 67 IN | TEMPERATURE: 97.8 F | OXYGEN SATURATION: 98 %

## 2019-06-28 DIAGNOSIS — E16.2 HYPOGLYCEMIA: Primary | ICD-10-CM

## 2019-06-28 DIAGNOSIS — N30.00 ACUTE CYSTITIS WITHOUT HEMATURIA: ICD-10-CM

## 2019-06-28 DIAGNOSIS — I73.9 PERIPHERAL ARTERIAL DISEASE (HCC): ICD-10-CM

## 2019-06-28 LAB
ALBUMIN SERPL-MCNC: 3.2 G/DL (ref 3.5–5)
ALBUMIN/GLOB SERPL: 0.7 {RATIO} (ref 1.1–2.2)
ALP SERPL-CCNC: 158 U/L (ref 45–117)
ALT SERPL-CCNC: 57 U/L (ref 12–78)
ANION GAP SERPL CALC-SCNC: 9 MMOL/L (ref 5–15)
APPEARANCE UR: CLEAR
AST SERPL-CCNC: 36 U/L (ref 15–37)
ATRIAL RATE: 97 BPM
BACTERIA URNS QL MICRO: ABNORMAL /HPF
BASOPHILS # BLD: 0 K/UL (ref 0–0.1)
BASOPHILS NFR BLD: 0 % (ref 0–1)
BILIRUB SERPL-MCNC: 0.4 MG/DL (ref 0.2–1)
BILIRUB UR QL: NEGATIVE
BUN SERPL-MCNC: 21 MG/DL (ref 6–20)
BUN/CREAT SERPL: 15 (ref 12–20)
CALCIUM SERPL-MCNC: 8.9 MG/DL (ref 8.5–10.1)
CALCULATED P AXIS, ECG09: 59 DEGREES
CALCULATED R AXIS, ECG10: -52 DEGREES
CALCULATED T AXIS, ECG11: 38 DEGREES
CHLORIDE SERPL-SCNC: 107 MMOL/L (ref 97–108)
CO2 SERPL-SCNC: 21 MMOL/L (ref 21–32)
COLOR UR: ABNORMAL
COMMENT, HOLDF: NORMAL
CREAT SERPL-MCNC: 1.41 MG/DL (ref 0.55–1.02)
DIAGNOSIS, 93000: NORMAL
DIFFERENTIAL METHOD BLD: ABNORMAL
EOSINOPHIL # BLD: 0 K/UL (ref 0–0.4)
EOSINOPHIL NFR BLD: 0 % (ref 0–7)
EPITH CASTS URNS QL MICRO: ABNORMAL /LPF
ERYTHROCYTE [DISTWIDTH] IN BLOOD BY AUTOMATED COUNT: 17.8 % (ref 11.5–14.5)
GLOBULIN SER CALC-MCNC: 4.3 G/DL (ref 2–4)
GLUCOSE BLD STRIP.AUTO-MCNC: 68 MG/DL (ref 65–100)
GLUCOSE BLD STRIP.AUTO-MCNC: 69 MG/DL (ref 65–100)
GLUCOSE BLD STRIP.AUTO-MCNC: 89 MG/DL (ref 65–100)
GLUCOSE SERPL-MCNC: 110 MG/DL (ref 65–100)
GLUCOSE UR STRIP.AUTO-MCNC: NEGATIVE MG/DL
HCT VFR BLD AUTO: 34.6 % (ref 35–47)
HGB BLD-MCNC: 9.9 G/DL (ref 11.5–16)
HGB UR QL STRIP: NEGATIVE
IMM GRANULOCYTES # BLD AUTO: 0.2 K/UL (ref 0–0.04)
IMM GRANULOCYTES NFR BLD AUTO: 1 % (ref 0–0.5)
KETONES UR QL STRIP.AUTO: NEGATIVE MG/DL
LEUKOCYTE ESTERASE UR QL STRIP.AUTO: NEGATIVE
LYMPHOCYTES # BLD: 1.4 K/UL (ref 0.8–3.5)
LYMPHOCYTES NFR BLD: 8 % (ref 12–49)
MCH RBC QN AUTO: 24.2 PG (ref 26–34)
MCHC RBC AUTO-ENTMCNC: 28.6 G/DL (ref 30–36.5)
MCV RBC AUTO: 84.6 FL (ref 80–99)
MONOCYTES # BLD: 0.9 K/UL (ref 0–1)
MONOCYTES NFR BLD: 5 % (ref 5–13)
NEUTS SEG # BLD: 15 K/UL (ref 1.8–8)
NEUTS SEG NFR BLD: 86 % (ref 32–75)
NITRITE UR QL STRIP.AUTO: POSITIVE
NRBC # BLD: 0 K/UL (ref 0–0.01)
NRBC BLD-RTO: 0 PER 100 WBC
P-R INTERVAL, ECG05: 130 MS
PH UR STRIP: 5 [PH] (ref 5–8)
PLATELET # BLD AUTO: 522 K/UL (ref 150–400)
PMV BLD AUTO: 9.2 FL (ref 8.9–12.9)
POTASSIUM SERPL-SCNC: 4.4 MMOL/L (ref 3.5–5.1)
PROT SERPL-MCNC: 7.5 G/DL (ref 6.4–8.2)
PROT UR STRIP-MCNC: NEGATIVE MG/DL
Q-T INTERVAL, ECG07: 344 MS
QRS DURATION, ECG06: 72 MS
QTC CALCULATION (BEZET), ECG08: 436 MS
RBC # BLD AUTO: 4.09 M/UL (ref 3.8–5.2)
RBC #/AREA URNS HPF: ABNORMAL /HPF (ref 0–5)
RBC MORPH BLD: ABNORMAL
SAMPLES BEING HELD,HOLD: NORMAL
SERVICE CMNT-IMP: NORMAL
SODIUM SERPL-SCNC: 137 MMOL/L (ref 136–145)
SP GR UR REFRACTOMETRY: 1.02 (ref 1–1.03)
TROPONIN I BLD-MCNC: <0.04 NG/ML (ref 0–0.08)
UR CULT HOLD, URHOLD: NORMAL
UROBILINOGEN UR QL STRIP.AUTO: 0.2 EU/DL (ref 0.2–1)
VENTRICULAR RATE, ECG03: 97 BPM
WBC # BLD AUTO: 17.5 K/UL (ref 3.6–11)
WBC MORPH BLD: ABNORMAL
WBC URNS QL MICRO: ABNORMAL /HPF (ref 0–4)

## 2019-06-28 PROCEDURE — 81001 URINALYSIS AUTO W/SCOPE: CPT

## 2019-06-28 PROCEDURE — 82962 GLUCOSE BLOOD TEST: CPT

## 2019-06-28 PROCEDURE — 80053 COMPREHEN METABOLIC PANEL: CPT

## 2019-06-28 PROCEDURE — 93005 ELECTROCARDIOGRAM TRACING: CPT

## 2019-06-28 PROCEDURE — 99285 EMERGENCY DEPT VISIT HI MDM: CPT

## 2019-06-28 PROCEDURE — 87086 URINE CULTURE/COLONY COUNT: CPT

## 2019-06-28 PROCEDURE — 36415 COLL VENOUS BLD VENIPUNCTURE: CPT

## 2019-06-28 PROCEDURE — 84484 ASSAY OF TROPONIN QUANT: CPT

## 2019-06-28 PROCEDURE — 87186 SC STD MICRODIL/AGAR DIL: CPT

## 2019-06-28 PROCEDURE — 85025 COMPLETE CBC W/AUTO DIFF WBC: CPT

## 2019-06-28 PROCEDURE — 87077 CULTURE AEROBIC IDENTIFY: CPT

## 2019-06-28 PROCEDURE — 93923 UPR/LXTR ART STDY 3+ LVLS: CPT

## 2019-06-28 PROCEDURE — 93922 UPR/L XTREMITY ART 2 LEVELS: CPT

## 2019-06-28 RX ORDER — NITROFURANTOIN 25; 75 MG/1; MG/1
100 CAPSULE ORAL 2 TIMES DAILY
Qty: 6 CAP | Refills: 0 | Status: SHIPPED | OUTPATIENT
Start: 2019-06-28 | End: 2019-07-01

## 2019-06-28 RX ORDER — SODIUM CHLORIDE 0.9 % (FLUSH) 0.9 %
5-40 SYRINGE (ML) INJECTION EVERY 8 HOURS
Status: DISCONTINUED | OUTPATIENT
Start: 2019-06-28 | End: 2019-06-28 | Stop reason: HOSPADM

## 2019-06-28 RX ORDER — SODIUM CHLORIDE 0.9 % (FLUSH) 0.9 %
5-40 SYRINGE (ML) INJECTION AS NEEDED
Status: DISCONTINUED | OUTPATIENT
Start: 2019-06-28 | End: 2019-06-28 | Stop reason: HOSPADM

## 2019-06-28 NOTE — ED NOTES
Recheck of blood glucose post juice is 89. Patient feels as though it has normalized. Ankle Brachial index is in process.

## 2019-06-28 NOTE — ED NOTES
Discharge instructions and prescriptions reviewed by provider and signed by patient and RN. Patient discharged ambulatory in care of daughter.

## 2019-06-28 NOTE — ED TRIAGE NOTES
Home health RN was doing wound care, and patient had symptoms of a low blood sugar, result is 43, was given juice, result was 63, was given oral glucose by EMS.

## 2019-06-28 NOTE — ED PROVIDER NOTES
Ana Cano is a 72 y.o. female who presents via EMS to the ED with a c/o low blood sugar today. Pt reports that her home health nurse was at her residence doing wound care, when pt began having sx of low blood sugar. Her nurse took her blood sugar and it was 43. EMS gave pt juice and blood glucose slightly increased to 63. Pt reports that she did not eat breakfast this morning but did eat chicken nuggets and fries from [employer] prior to her low blood sugar reading. Pt notes that she has had one episode of low blood sugar in the past, but states that she was in rehab after a surgery and was not eating much. Pt states that she takes Glipizide  daily recently her PCP increased her from taking it only once daily to BID (changed about a month ago). She also reports that she takes Onglyza and has been taking this for a while (no complications). Pt additionally reports that she has been more fatigued and also has had some tingling in her left foot, and also has some bluish discoloration especially in her left Great Toe. Pt specifically denies chest pain, shortness of breath, n/v,d , fever, chills, numbness, tingling, abdominal pain, back pain, cough, leg swelling, dizziness or any other acute sx. Pt denies any recent travel, known sick contacts, or recent illness. PCP: Janee Wiley MD 
PMHx significant for: DVT, HTN, CAD, NSTEMI, Sleep Apnea, DM, Fibromyalgia, Asthma, GERD, A-Fibrillation PSHx significant for: Cardiac Stents, Colostomy, Colostomy Reversal, Hysterectomy, , Hernia Repair, Breast Lumpectomy, Bladder Sling Social Hx: Tobacco: Former smoker EtOH: rare Illicit drug use: none There are no further complaints or symptoms at this time. Signed by: amy Iverson for [facility] on 2019 at 1:54 PM. The history is provided by the patient and medical records. No  was used. Past Medical History:  
Diagnosis Date  Asthma  Atrial fibrillation ([facility] 2018  Autoimmune disease ([facility]   
 fibromyalgia  CAD (coronary artery disease) 10/9/2015  Closed wedge compression fracture of T7 vertebra ([facility] 2018  Diabetes ([facility]  DVT (deep vein thrombosis) in pregnancy ([facility]  GERD (gastroesophageal reflux disease)  HTN (hypertension)  NSTEMI (non-ST elevated myocardial infarction) ([facility] 10/9/2015  Obesity (BMI 30-39.9) 2018  Sleep apnea   
 wears CPAP Past Surgical History:  
Procedure Laterality Date  ABDOMEN SURGERY PROC UNLISTED    
 hital hernia repair, gall bladder removed  BREAST SURGERY PROCEDURE UNLISTED    
 lumpectomy  CARDIAC SURG PROCEDURE UNLIST  10/9/15  
 2 stents [address]  HX COLOSTOMY    
 and reversal, post episiotomy repair [address]  HX UROLOGICAL  2009  
 bladder sling Family History:  
Problem Relation Age of Onset  Diabetes Mother  Heart Failure Mother  Kidney Disease Mother  Diabetes Father  Heart Disease Father  Elevated Lipids Father  Heart Failure Father  Heart Disease Brother  Cancer Brother   
     kidney  Diabetes Brother  No Known Problems Brother  No Known Problems Brother Social History Socioeconomic History  Marital status:  Spouse name: Not on file  Number of children: Not on file  Years of education: Not on file  Highest education level: Not on file Occupational History  Not on file Social Needs  Financial resource strain: Not on file  Food insecurity:  
  Worry: Not on file Inability: Not on file  Transportation needs:  
  Medical: Not on file Non-medical: Not on file Tobacco Use  Smoking status: Former Smoker Last attempt to quit: 3/30/2017 Years since quittin.2  Smokeless tobacco: Never Used Substance and Sexual Activity  Alcohol use:  No  
 Alcohol/week: 0.0 oz  
  Comment: very very rarely  Drug use: No  
 Sexual activity: Never Lifestyle  Physical activity:  
  Days per week: Not on file Minutes per session: Not on file  Stress: Not on file Relationships  Social connections:  
  Talks on phone: Not on file Gets together: Not on file Attends Latter day service: Not on file Active member of club or organization: Not on file Attends meetings of clubs or organizations: Not on file Relationship status: Not on file  Intimate partner violence:  
  Fear of current or ex partner: Not on file Emotionally abused: Not on file Physically abused: Not on file Forced sexual activity: Not on file Other Topics Concern  Not on file Social History Narrative  Not on file ALLERGIES: Advair diskus [fluticasone propion-salmeterol]; Aspartame; Breo ellipta [fluticasone furoate-vilanterol]; Ciprofloxacin; Statins-hmg-coa reductase inhibitors; Sulfa (sulfonamide antibiotics); Tetracycline; and Zetia [ezetimibe] Review of Systems Vitals:  
 06/28/19 1339 BP: 147/73 Pulse: (!) 108 Resp: 20 Temp: 97.8 °F (36.6 °C) SpO2: 100% Weight: 101.2 kg (223 lb) Height: 5' 6.5\" (1.689 m) Physical Exam  
Constitutional: She is oriented to person, place, and time. She appears well-developed and well-nourished. No distress. HENT:  
Head: Normocephalic and atraumatic. Eyes: Conjunctivae are normal. No scleral icterus. Neck: Neck supple. No tracheal deviation present. Cardiovascular: Normal rate, regular rhythm, normal heart sounds and intact distal pulses. Exam reveals decreased pulses. Exam reveals no gallop and no friction rub. No murmur heard. No palpable pedal pulses bilaterally Pulmonary/Chest: Effort normal and breath sounds normal. She has no wheezes. She has no rales. Abdominal: Soft. She exhibits no distension. There is no tenderness.  There is no rebound and no guarding. Musculoskeletal: She exhibits no edema. Neurological: She is alert and oriented to person, place, and time. Skin: Skin is warm and dry. No rash noted. Bluish Discoloration to the rt mid foot consistent with bruise Bluish discoloration in left foot, significant discoloration in left great toe Both feet are at the same temperature Psychiatric: She has a normal mood and affect. Nursing note and vitals reviewed. MDM Procedures Consult note: Dr. Clarence Rebolledo - recommends discharge with angioplasty next week. Geary Kussmaul, MD 
4:08 PM 
 
A/P: hypoglycemia - unclear cause; may be because she skipped breakfast although she has done that before; stable in ED; ate meal prior to arrival.  Mild tachycardia; WBC - 17 (possibly due to stress response given hypoglycemia vs UTI) Also has left blue foot that has developed with known PAD - spoke with Dr Clarence Rebolledo who plans to do intervention next week. Geary Kussmaul, MD 
4:09 PM 
 
EKG: NSR; rate - 97; LAFB; normal ST,T.  Geary Kussmaul, MD 
4:10 PM 
 
4:00 PM 
Change of shift. Care of patient signed over to Dr. Rita Cedillo. Bedside handoff complete. Awaiting UA.   Geary Kussmaul, MD

## 2019-06-28 NOTE — PROGRESS NOTES
Patient seen and examined The bluish discoloration on her left foot is bruising. (also seen on the right) She will likely benefit from angiographic evaluation at some point but this can be scheduled as an outpatient Discussed with patient and her daughter She will call office on Monday.

## 2019-06-28 NOTE — PROGRESS NOTES
Patient well known to me from previous interactions. Anticoagulated Foot looks like bruising to me but will see what ABIs show.

## 2019-06-28 NOTE — DISCHARGE INSTRUCTIONS
Urinary Tract Infection in Women: Care Instructions  Your Care Instructions    A urinary tract infection, or UTI, is a general term for an infection anywhere between the kidneys and the urethra (where urine comes out). Most UTIs are bladder infections. They often cause pain or burning when you urinate. UTIs are caused by bacteria and can be cured with antibiotics. Be sure to complete your treatment so that the infection goes away. Follow-up care is a key part of your treatment and safety. Be sure to make and go to all appointments, and call your doctor if you are having problems. It's also a good idea to know your test results and keep a list of the medicines you take. How can you care for yourself at home? · Take your antibiotics as directed. Do not stop taking them just because you feel better. You need to take the full course of antibiotics. · Drink extra water and other fluids for the next day or two. This may help wash out the bacteria that are causing the infection. (If you have kidney, heart, or liver disease and have to limit fluids, talk with your doctor before you increase your fluid intake.)  · Avoid drinks that are carbonated or have caffeine. They can irritate the bladder. · Urinate often. Try to empty your bladder each time. · To relieve pain, take a hot bath or lay a heating pad set on low over your lower belly or genital area. Never go to sleep with a heating pad in place. To prevent UTIs  · Drink plenty of water each day. This helps you urinate often, which clears bacteria from your system. (If you have kidney, heart, or liver disease and have to limit fluids, talk with your doctor before you increase your fluid intake.)  · Urinate when you need to. · Urinate right after you have sex. · Change sanitary pads often. · Avoid douches, bubble baths, feminine hygiene sprays, and other feminine hygiene products that have deodorants.   · After going to the bathroom, wipe from front to back.  When should you call for help? Call your doctor now or seek immediate medical care if:    · Symptoms such as fever, chills, nausea, or vomiting get worse or appear for the first time.     · You have new pain in your back just below your rib cage. This is called flank pain.     · There is new blood or pus in your urine.     · You have any problems with your antibiotic medicine.    Watch closely for changes in your health, and be sure to contact your doctor if:    · You are not getting better after taking an antibiotic for 2 days.     · Your symptoms go away but then come back. Where can you learn more? Go to http://eloina-sherman.info/. Enter J471 in the search box to learn more about \"Urinary Tract Infection in Women: Care Instructions. \"  Current as of: March 20, 2018  Content Version: 11.9  © 8347-1475 [employer]. Care instructions adapted under license by [employer] (which disclaims liability or warranty for this information). If you have questions about a medical condition or this instruction, always ask your healthcare professional. [place] [address] any warranty or liability for your use of this information. Patient Education        Hypoglycemia: Care Instructions  Your Care Instructions    Hypoglycemia means that your blood sugar is low and your body is not getting enough fuel. Some people get low blood sugar from not eating often enough. Some medicines to treat diabetes can cause low blood sugar. People who have had surgery on their stomachs or intestines may get hypoglycemia. Problems with the pancreas, kidneys, or liver also can cause low blood sugar. A snack or drink with sugar in it will raise your blood sugar and should ease your symptoms right away. Your doctor may recommend that you change or stop your medicines until you can get your blood sugar levels under control.  In the long run, you may need to change your diet and eating habits so that you get enough fuel for your body throughout the day. Follow-up care is a key part of your treatment and safety. Be sure to make and go to all appointments, and call your doctor if you are having problems. It's also a good idea to know your test results and keep a list of the medicines you take. How can you care for yourself at home? · Learn to recognize the early signs of low blood sugar. Signs include:  ? Nausea. ? Hunger. ? Feeling nervous, irritable, or shaky. ? Cold, clammy, wet skin. ? Sweating (when you are not exercising). ? A fast heartbeat.  ? Numbness or tingling of the fingertips or lips. · If you feel an episode of low blood sugar coming on, drink fruit juice or sugared (not diet) soda, or eat sugar in the form of candy, cubes, or tablets. [employer] are another [employer]. · Eat small, frequent meals so that you do not get too hungry between meals. · Balance extra exercise with eating more. · Keep a written record of your low blood sugar episodes, including when you last ate and what you ate, so that you can learn what causes your blood sugar to drop. · Make sure your family, friends, and coworkers know the symptoms of low blood sugar and know what to do to get your sugar level up. · Wear medical alert jewelry that lists your condition. You can buy this at most drugstores. When should you call for help? Call 911 anytime you think you may need emergency care. For example, call if:    · You passed out (lost consciousness).     · You are confused or cannot think clearly.     · Your blood sugar is very high or very low.    Watch closely for changes in your health, and be sure to contact your doctor if:    · Your blood sugar stays outside the level your doctor set for you.     · You have any problems. Where can you learn more? Go to http://eloina-sherman.info/.   Enter O012 in the search box to learn more about \"Hypoglycemia: Care Instructions. \"  Current as of: July 25, 2018  Content Version: 11.9  © 3208-9761 [employer]. Care instructions adapted under license by [employer] (which disclaims liability or warranty for this information). If you have questions about a medical condition or this instruction, always ask your healthcare professional. [address] any warranty or liability for your use of this information. Patient Education        Learning About Peripheral Arterial Disease of the Legs  What is peripheral arterial disease? Peripheral arterial disease (PAD) is narrowing or blockage of arteries in your arms and legs. The most common cause of PAD is the buildup of plaque on the inside of arteries. Plaque is made of extra cholesterol, calcium, and other material in your blood. Over time, plaque builds up in the walls of the arteries, including those that supply blood to your legs. This buildup leads to poor blood flow. When you have PAD, you have a risk of having plaque in other arteries in your body. This raises your risk of a heart attack and stroke. This information focuses on peripheral arterial disease of the legs, the area where it is most common. When you have PAD of the legs and you walk or exercise, your leg muscles may not get enough blood. This may cause symptoms, such as leg pain during exercise. Peripheral arterial disease is also called peripheral vascular disease. What are the symptoms? Many people who have PAD do not have any symptoms. But if you have symptoms, you may have weak or tired legs, difficulty walking or balancing, or pain. If you have pain, you might feel a tight, aching, or squeezing pain in the calf, thigh, or buttock. This pain usually happens after you have walked a certain distance. The pain goes away if you stop walking. This pain is called intermittent claudication.   If PAD gets worse, you may have other symptoms that are caused by poor blood flow to your legs and feet. You may have:  · Cold or numb feet or toes. · Sores that are slow to heal.  · Leg or foot pain while you are at rest.  · Feet and toes that become pale from exercise or when elevated. · Feet that turn red when dangled. · Blue or purple marks on your legs, feet, or toes. How can you prevent PAD? · Quit smoking. Quitting smoking is one of the best things you can do to help prevent PAD. If you need help quitting, talk to your doctor about stop-smoking programs and medicines. These can increase your chances of quitting for good. · Stay at a healthy weight. · Manage other health problems, including diabetes, high blood pressure, and high cholesterol. · Be physically active. Get at least 30 minutes of exercise on most days of the week. Walking is a good choice. You also may want to do other activities, such as running, swimming, cycling, or playing tennis or team sports. · Eat a variety of heart-healthy foods. ? Eat fruits, vegetables, whole grains (like oatmeal), dried beans and peas, nuts and seeds, soy products (like tofu), and fat-free or low-fat dairy products. ? Replace butter, margarine, and hydrogenated or partially hydrogenated oils with olive and canola oils. (Canola oil margarine without trans fat is fine.)  ? Replace red meat with fish, poultry, and soy protein (like tofu). ? Limit processed and packaged foods like chips, crackers, and cookies. How is PAD treated? Your doctor may suggest ways to relieve symptoms and lower your risk of heart attack and stroke. These may include:  · Quitting smoking. It's one of the most important things you can do. If you need help quitting, talk to your doctor about stop-smoking programs and medicines. These can increase your chances of quitting for good. · Eating heart-healthy foods. · Staying at a healthy weight. Lose weight if you need to. · Regular exercise (if your doctor says it's safe).  Try walking, swimming, or biking for at least 30 minutes on most, if not all, days of the week. If you have leg symptoms when you exercise, your doctor might recommend a specialized exercise program that may relieve symptoms. The goal is to be able to walk farther without pain. · Medicines that help manage other problems such as high blood pressure and high cholesterol. · Medicine, such as aspirin, that prevents blood clots which could cause a heart attack or stroke. · Procedures, such as opening narrowed or blocked arteries (angioplasty) or using healthy blood vessels to create detours around narrowed or blocked arteries (bypass surgery). Follow-up care is a key part of your treatment and safety. Be sure to make and go to all appointments, and call your doctor if you are having problems. It's also a good idea to know your test results and keep a list of the medicines you take. Where can you learn more? Go to http://eloina-sherman.info/. Enter R878 in the search box to learn more about \"Learning About Peripheral Arterial Disease of the Legs. \"  Current as of: July 22, 2018  Content Version: 11.9  © 3258-1084 [employer]. Care instructions adapted under license by [employer] (which disclaims liability or warranty for this information). If you have questions about a medical condition or this instruction, always ask your healthcare professional. [address] any warranty or liability for your use of this information.

## 2019-06-28 NOTE — ED NOTES
4:20 PM 
Change of shift. Care of patient taken over from Dr. Chris Yap; H&P reviewed, bedside handoff complete. Awaiting labs.

## 2019-06-30 LAB
BACTERIA SPEC CULT: ABNORMAL
CC UR VC: ABNORMAL
LEFT ABI: 0.34
LEFT ARM BP: 135 MMHG
LEFT CALF PRESSURE: 45 MMHG
LEFT HIGH THIGH PRESSURE: 99 MMHG
LEFT LOW THIGH PRESSURE: 89 MMHG
LEFT POSTERIOR TIBIAL: 46 MMHG
LEFT TBI: 0
LEFT TOE PRESSURE: 0 MMHG
RIGHT ABI: 0.67
RIGHT ARM BP: 124 MMHG
RIGHT POSTERIOR TIBIAL: 90 MMHG
RIGHT TBI: 0.66
RIGHT TOE PRESSURE: 89 MMHG
SERVICE CMNT-IMP: ABNORMAL

## 2019-07-13 ENCOUNTER — HOSPITAL ENCOUNTER (INPATIENT)
Age: 74
LOS: 2 days | Discharge: HOME HEALTH | DRG: 872 | End: 2019-07-15
Attending: EMERGENCY MEDICINE | Admitting: INTERNAL MEDICINE
Payer: MEDICARE

## 2019-07-13 ENCOUNTER — APPOINTMENT (OUTPATIENT)
Dept: CT IMAGING | Age: 74
DRG: 872 | End: 2019-07-13
Attending: PHYSICIAN ASSISTANT
Payer: MEDICARE

## 2019-07-13 DIAGNOSIS — Z79.01 CHRONIC ANTICOAGULATION: ICD-10-CM

## 2019-07-13 DIAGNOSIS — N17.9 AKI (ACUTE KIDNEY INJURY) (HCC): ICD-10-CM

## 2019-07-13 DIAGNOSIS — N39.0 ACUTE UTI: Primary | ICD-10-CM
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
## 2019-07-13 DIAGNOSIS — S09.90XA INJURY OF HEAD, INITIAL ENCOUNTER: ICD-10-CM

## 2019-07-13 DIAGNOSIS — S81.811A LACERATION OF RIGHT LOWER LEG, INITIAL ENCOUNTER: ICD-10-CM

## 2019-07-13 PROBLEM — I73.9 PAD (PERIPHERAL ARTERY DISEASE) (HCC): Status: ACTIVE | Noted: 2019-07-13

## 2019-07-13 PROBLEM — D64.9 NORMOCYTIC ANEMIA: Status: ACTIVE | Noted: 2019-07-13

## 2019-07-13 PROBLEM — J44.9 COPD (CHRONIC OBSTRUCTIVE PULMONARY DISEASE) (HCC): Status: ACTIVE | Noted: 2019-07-13

## 2019-07-13 PROBLEM — R55 SYNCOPE AND COLLAPSE: Status: ACTIVE | Noted: 2019-07-13

## 2019-07-13 PROBLEM — S81.802A LEG WOUND, LEFT: Status: ACTIVE | Noted: 2019-07-13

## 2019-07-13 PROBLEM — A41.9 SEPSIS (HCC): Status: ACTIVE | Noted: 2019-07-13

## 2019-07-13 LAB
ALBUMIN SERPL-MCNC: 3 G/DL (ref 3.5–5)
ALBUMIN/GLOB SERPL: 0.8 {RATIO} (ref 1.1–2.2)
ALP SERPL-CCNC: 127 U/L (ref 45–117)
ALT SERPL-CCNC: 38 U/L (ref 12–78)
ANION GAP SERPL CALC-SCNC: 9 MMOL/L (ref 5–15)
APPEARANCE UR: ABNORMAL
AST SERPL-CCNC: 33 U/L (ref 15–37)
BACTERIA URNS QL MICRO: ABNORMAL /HPF
BASOPHILS # BLD: 0 K/UL (ref 0–0.1)
BASOPHILS NFR BLD: 0 % (ref 0–1)
BILIRUB SERPL-MCNC: 0.4 MG/DL (ref 0.2–1)
BILIRUB UR QL: NEGATIVE
BUN SERPL-MCNC: 19 MG/DL (ref 6–20)
BUN/CREAT SERPL: 13 (ref 12–20)
CALCIUM SERPL-MCNC: 8.3 MG/DL (ref 8.5–10.1)
CHLORIDE SERPL-SCNC: 106 MMOL/L (ref 97–108)
CO2 SERPL-SCNC: 21 MMOL/L (ref 21–32)
COLOR UR: ABNORMAL
COMMENT, HOLDF: NORMAL
CREAT SERPL-MCNC: 1.43 MG/DL (ref 0.55–1.02)
DIFFERENTIAL METHOD BLD: ABNORMAL
EOSINOPHIL # BLD: 0 K/UL (ref 0–0.4)
EOSINOPHIL NFR BLD: 0 % (ref 0–7)
EPITH CASTS URNS QL MICRO: ABNORMAL /LPF
ERYTHROCYTE [DISTWIDTH] IN BLOOD BY AUTOMATED COUNT: 18.3 % (ref 11.5–14.5)
FERRITIN SERPL-MCNC: 32 NG/ML (ref 8–252)
GLOBULIN SER CALC-MCNC: 3.9 G/DL (ref 2–4)
GLUCOSE BLD STRIP.AUTO-MCNC: 101 MG/DL (ref 65–100)
GLUCOSE SERPL-MCNC: 96 MG/DL (ref 65–100)
GLUCOSE UR STRIP.AUTO-MCNC: NEGATIVE MG/DL
HCT VFR BLD AUTO: 30.9 % (ref 35–47)
HGB BLD-MCNC: 8.8 G/DL (ref 11.5–16)
HGB UR QL STRIP: ABNORMAL
HYALINE CASTS URNS QL MICRO: ABNORMAL /LPF (ref 0–5)
IMM GRANULOCYTES # BLD AUTO: 0.2 K/UL (ref 0–0.04)
IMM GRANULOCYTES NFR BLD AUTO: 1 % (ref 0–0.5)
IRON SATN MFR SERPL: 8 % (ref 20–50)
IRON SERPL-MCNC: 25 UG/DL (ref 35–150)
KETONES UR QL STRIP.AUTO: NEGATIVE MG/DL
LEUKOCYTE ESTERASE UR QL STRIP.AUTO: ABNORMAL
LYMPHOCYTES # BLD: 1.5 K/UL (ref 0.8–3.5)
LYMPHOCYTES NFR BLD: 9 % (ref 12–49)
MAGNESIUM SERPL-MCNC: 2.1 MG/DL (ref 1.6–2.4)
MCH RBC QN AUTO: 23.8 PG (ref 26–34)
MCHC RBC AUTO-ENTMCNC: 28.5 G/DL (ref 30–36.5)
MCV RBC AUTO: 83.7 FL (ref 80–99)
MONOCYTES # BLD: 1 K/UL (ref 0–1)
MONOCYTES NFR BLD: 6 % (ref 5–13)
NEUTS SEG # BLD: 14.4 K/UL (ref 1.8–8)
NEUTS SEG NFR BLD: 84 % (ref 32–75)
NITRITE UR QL STRIP.AUTO: POSITIVE
NRBC # BLD: 0 K/UL (ref 0–0.01)
NRBC BLD-RTO: 0 PER 100 WBC
PH UR STRIP: 5.5 [PH] (ref 5–8)
PLATELET # BLD AUTO: 428 K/UL (ref 150–400)
PMV BLD AUTO: 9.4 FL (ref 8.9–12.9)
POTASSIUM SERPL-SCNC: 4.2 MMOL/L (ref 3.5–5.1)
PROT SERPL-MCNC: 6.9 G/DL (ref 6.4–8.2)
PROT UR STRIP-MCNC: NEGATIVE MG/DL
RBC # BLD AUTO: 3.69 M/UL (ref 3.8–5.2)
RBC #/AREA URNS HPF: ABNORMAL /HPF (ref 0–5)
RBC MORPH BLD: ABNORMAL
SAMPLES BEING HELD,HOLD: NORMAL
SERVICE CMNT-IMP: ABNORMAL
SODIUM SERPL-SCNC: 136 MMOL/L (ref 136–145)
SP GR UR REFRACTOMETRY: 1.01 (ref 1–1.03)
TIBC SERPL-MCNC: 321 UG/DL (ref 250–450)
TROPONIN I SERPL-MCNC: <0.05 NG/ML
UR CULT HOLD, URHOLD: NORMAL
UROBILINOGEN UR QL STRIP.AUTO: 0.2 EU/DL (ref 0.2–1)
WBC # BLD AUTO: 17.1 K/UL (ref 3.6–11)
WBC MORPH BLD: ABNORMAL
WBC URNS QL MICRO: ABNORMAL /HPF (ref 0–4)

## 2019-07-13 PROCEDURE — 83540 ASSAY OF IRON: CPT

## 2019-07-13 PROCEDURE — 74011250636 HC RX REV CODE- 250/636: Performed by: INTERNAL MEDICINE

## 2019-07-13 PROCEDURE — 82728 ASSAY OF FERRITIN: CPT

## 2019-07-13 PROCEDURE — 94761 N-INVAS EAR/PLS OXIMETRY MLT: CPT

## 2019-07-13 PROCEDURE — 74011250637 HC RX REV CODE- 250/637: Performed by: INTERNAL MEDICINE

## 2019-07-13 PROCEDURE — 74011250636 HC RX REV CODE- 250/636: Performed by: EMERGENCY MEDICINE

## 2019-07-13 PROCEDURE — 83735 ASSAY OF MAGNESIUM: CPT

## 2019-07-13 PROCEDURE — 72125 CT NECK SPINE W/O DYE: CPT

## 2019-07-13 PROCEDURE — 80053 COMPREHEN METABOLIC PANEL: CPT

## 2019-07-13 PROCEDURE — 74011000250 HC RX REV CODE- 250: Performed by: INTERNAL MEDICINE

## 2019-07-13 PROCEDURE — 93005 ELECTROCARDIOGRAM TRACING: CPT

## 2019-07-13 PROCEDURE — 74011000250 HC RX REV CODE- 250: Performed by: PHYSICIAN ASSISTANT

## 2019-07-13 PROCEDURE — 77030029684 HC NEB SM VOL KT MONA -A

## 2019-07-13 PROCEDURE — 36415 COLL VENOUS BLD VENIPUNCTURE: CPT

## 2019-07-13 PROCEDURE — 94640 AIRWAY INHALATION TREATMENT: CPT

## 2019-07-13 PROCEDURE — 77030018836 HC SOL IRR NACL ICUM -A

## 2019-07-13 PROCEDURE — 85025 COMPLETE CBC W/AUTO DIFF WBC: CPT

## 2019-07-13 PROCEDURE — 87086 URINE CULTURE/COLONY COUNT: CPT

## 2019-07-13 PROCEDURE — 87186 SC STD MICRODIL/AGAR DIL: CPT

## 2019-07-13 PROCEDURE — 65660000000 HC RM CCU STEPDOWN

## 2019-07-13 PROCEDURE — 0HQKXZZ REPAIR RIGHT LOWER LEG SKIN, EXTERNAL APPROACH: ICD-10-PCS | Performed by: EMERGENCY MEDICINE

## 2019-07-13 PROCEDURE — 87077 CULTURE AEROBIC IDENTIFY: CPT

## 2019-07-13 PROCEDURE — 99285 EMERGENCY DEPT VISIT HI MDM: CPT

## 2019-07-13 PROCEDURE — 70450 CT HEAD/BRAIN W/O DYE: CPT

## 2019-07-13 PROCEDURE — 75810000293 HC SIMP/SUPERF WND  RPR

## 2019-07-13 PROCEDURE — 77030002916 HC SUT ETHLN J&J -A

## 2019-07-13 PROCEDURE — 96374 THER/PROPH/DIAG INJ IV PUSH: CPT

## 2019-07-13 PROCEDURE — 74011636637 HC RX REV CODE- 636/637: Performed by: INTERNAL MEDICINE

## 2019-07-13 PROCEDURE — 84484 ASSAY OF TROPONIN QUANT: CPT

## 2019-07-13 PROCEDURE — 74011000250 HC RX REV CODE- 250: Performed by: EMERGENCY MEDICINE

## 2019-07-13 PROCEDURE — 82962 GLUCOSE BLOOD TEST: CPT

## 2019-07-13 PROCEDURE — 77030011943

## 2019-07-13 PROCEDURE — 81001 URINALYSIS AUTO W/SCOPE: CPT

## 2019-07-13 PROCEDURE — 74011250637 HC RX REV CODE- 250/637: Performed by: PHYSICIAN ASSISTANT

## 2019-07-13 RX ORDER — BUDESONIDE 0.5 MG/2ML
500 INHALANT ORAL
Status: DISCONTINUED | OUTPATIENT
Start: 2019-07-13 | End: 2019-07-15 | Stop reason: HOSPADM

## 2019-07-13 RX ORDER — INSULIN LISPRO 100 [IU]/ML
INJECTION, SOLUTION INTRAVENOUS; SUBCUTANEOUS
Status: DISCONTINUED | OUTPATIENT
Start: 2019-07-13 | End: 2019-07-15 | Stop reason: HOSPADM

## 2019-07-13 RX ORDER — PREDNISONE 5 MG/1
10 TABLET ORAL 2 TIMES DAILY WITH MEALS
Status: DISCONTINUED | OUTPATIENT
Start: 2019-07-13 | End: 2019-07-15 | Stop reason: HOSPADM

## 2019-07-13 RX ORDER — ARFORMOTEROL TARTRATE 15 UG/2ML
15 SOLUTION RESPIRATORY (INHALATION)
Status: DISCONTINUED | OUTPATIENT
Start: 2019-07-13 | End: 2019-07-15 | Stop reason: HOSPADM

## 2019-07-13 RX ORDER — CLOPIDOGREL BISULFATE 75 MG/1
75 TABLET ORAL DAILY
Status: DISCONTINUED | OUTPATIENT
Start: 2019-07-14 | End: 2019-07-15 | Stop reason: HOSPADM

## 2019-07-13 RX ORDER — ACETAMINOPHEN 325 MG/1
650 TABLET ORAL
Status: DISCONTINUED | OUTPATIENT
Start: 2019-07-13 | End: 2019-07-15 | Stop reason: HOSPADM

## 2019-07-13 RX ORDER — SODIUM CHLORIDE 0.9 % (FLUSH) 0.9 %
5-40 SYRINGE (ML) INJECTION AS NEEDED
Status: DISCONTINUED | OUTPATIENT
Start: 2019-07-13 | End: 2019-07-15 | Stop reason: HOSPADM

## 2019-07-13 RX ORDER — PREDNISONE 10 MG/1
10 TABLET ORAL 2 TIMES DAILY WITH MEALS
COMMUNITY
End: 2021-01-01

## 2019-07-13 RX ORDER — MAGNESIUM SULFATE 100 %
4 CRYSTALS MISCELLANEOUS AS NEEDED
Status: DISCONTINUED | OUTPATIENT
Start: 2019-07-13 | End: 2019-07-15 | Stop reason: HOSPADM

## 2019-07-13 RX ORDER — DEXTROSE MONOHYDRATE 100 MG/ML
125-250 INJECTION, SOLUTION INTRAVENOUS AS NEEDED
Status: DISCONTINUED | OUTPATIENT
Start: 2019-07-13 | End: 2019-07-15 | Stop reason: HOSPADM

## 2019-07-13 RX ORDER — MONTELUKAST SODIUM 10 MG/1
10 TABLET ORAL DAILY
Status: DISCONTINUED | OUTPATIENT
Start: 2019-07-14 | End: 2019-07-15 | Stop reason: HOSPADM

## 2019-07-13 RX ORDER — POTASSIUM CHLORIDE 750 MG/1
10 TABLET, FILM COATED, EXTENDED RELEASE ORAL 2 TIMES DAILY
COMMUNITY
End: 2021-01-01

## 2019-07-13 RX ORDER — MIRTAZAPINE 15 MG/1
15 TABLET, FILM COATED ORAL
Status: DISCONTINUED | OUTPATIENT
Start: 2019-07-13 | End: 2019-07-15 | Stop reason: HOSPADM

## 2019-07-13 RX ORDER — NALOXONE HYDROCHLORIDE 0.4 MG/ML
0.4 INJECTION, SOLUTION INTRAMUSCULAR; INTRAVENOUS; SUBCUTANEOUS AS NEEDED
Status: DISCONTINUED | OUTPATIENT
Start: 2019-07-13 | End: 2019-07-15 | Stop reason: HOSPADM

## 2019-07-13 RX ORDER — MELATONIN
2000 DAILY
Status: DISCONTINUED | OUTPATIENT
Start: 2019-07-14 | End: 2019-07-15 | Stop reason: HOSPADM

## 2019-07-13 RX ORDER — METOPROLOL TARTRATE 25 MG/1
12.5 TABLET, FILM COATED ORAL
COMMUNITY
End: 2019-07-15

## 2019-07-13 RX ORDER — MONTELUKAST SODIUM 10 MG/1
10 TABLET ORAL DAILY
Status: ON HOLD | COMMUNITY
End: 2020-01-01

## 2019-07-13 RX ORDER — ONDANSETRON 2 MG/ML
4 INJECTION INTRAMUSCULAR; INTRAVENOUS
Status: DISCONTINUED | OUTPATIENT
Start: 2019-07-13 | End: 2019-07-15 | Stop reason: HOSPADM

## 2019-07-13 RX ORDER — SODIUM CHLORIDE 0.9 % (FLUSH) 0.9 %
5-40 SYRINGE (ML) INJECTION EVERY 8 HOURS
Status: DISCONTINUED | OUTPATIENT
Start: 2019-07-13 | End: 2019-07-15 | Stop reason: HOSPADM

## 2019-07-13 RX ORDER — DULOXETIN HYDROCHLORIDE 30 MG/1
60 CAPSULE, DELAYED RELEASE ORAL DAILY
Status: DISCONTINUED | OUTPATIENT
Start: 2019-07-14 | End: 2019-07-15 | Stop reason: HOSPADM

## 2019-07-13 RX ORDER — LANOLIN ALCOHOL/MO/W.PET/CERES
400 CREAM (GRAM) TOPICAL
COMMUNITY
Start: 2021-01-01

## 2019-07-13 RX ORDER — IPRATROPIUM BROMIDE AND ALBUTEROL SULFATE 2.5; .5 MG/3ML; MG/3ML
3 SOLUTION RESPIRATORY (INHALATION)
Status: DISCONTINUED | OUTPATIENT
Start: 2019-07-13 | End: 2019-07-15 | Stop reason: HOSPADM

## 2019-07-13 RX ORDER — SODIUM CHLORIDE 9 MG/ML
100 INJECTION, SOLUTION INTRAVENOUS CONTINUOUS
Status: DISCONTINUED | OUTPATIENT
Start: 2019-07-13 | End: 2019-07-14

## 2019-07-13 RX ORDER — PANTOPRAZOLE SODIUM 40 MG/1
40 TABLET, DELAYED RELEASE ORAL
Status: DISCONTINUED | OUTPATIENT
Start: 2019-07-13 | End: 2019-07-15 | Stop reason: HOSPADM

## 2019-07-13 RX ORDER — HYDROCODONE BITARTRATE AND ACETAMINOPHEN 5; 325 MG/1; MG/1
1 TABLET ORAL
Status: DISCONTINUED | OUTPATIENT
Start: 2019-07-13 | End: 2019-07-15 | Stop reason: HOSPADM

## 2019-07-13 RX ORDER — BISACODYL 5 MG
5 TABLET, DELAYED RELEASE (ENTERIC COATED) ORAL DAILY PRN
Status: DISCONTINUED | OUTPATIENT
Start: 2019-07-13 | End: 2019-07-15 | Stop reason: HOSPADM

## 2019-07-13 RX ORDER — TRAMADOL HYDROCHLORIDE 50 MG/1
50 TABLET ORAL
Status: COMPLETED | OUTPATIENT
Start: 2019-07-13 | End: 2019-07-13

## 2019-07-13 RX ADMIN — Medication 2 ML: at 13:11

## 2019-07-13 RX ADMIN — MIRTAZAPINE 15 MG: 15 TABLET, FILM COATED ORAL at 22:16

## 2019-07-13 RX ADMIN — WATER 1 G: 1 INJECTION INTRAMUSCULAR; INTRAVENOUS; SUBCUTANEOUS at 15:11

## 2019-07-13 RX ADMIN — ARFORMOTEROL TARTRATE 15 MCG: 15 SOLUTION RESPIRATORY (INHALATION) at 19:47

## 2019-07-13 RX ADMIN — PREDNISONE 10 MG: 5 TABLET ORAL at 18:48

## 2019-07-13 RX ADMIN — PANTOPRAZOLE SODIUM 40 MG: 40 TABLET, DELAYED RELEASE ORAL at 18:48

## 2019-07-13 RX ADMIN — APIXABAN 5 MG: 5 TABLET, FILM COATED ORAL at 18:48

## 2019-07-13 RX ADMIN — Medication 10 ML: at 22:16

## 2019-07-13 RX ADMIN — Medication 10 ML: at 18:52

## 2019-07-13 RX ADMIN — TRAMADOL HYDROCHLORIDE 50 MG: 50 TABLET, FILM COATED ORAL at 15:56

## 2019-07-13 RX ADMIN — SODIUM CHLORIDE 100 ML/HR: 900 INJECTION, SOLUTION INTRAVENOUS at 18:51

## 2019-07-13 RX ADMIN — BUDESONIDE 500 MCG: 0.5 INHALANT RESPIRATORY (INHALATION) at 19:47

## 2019-07-13 RX ADMIN — ONDANSETRON 4 MG: 2 INJECTION INTRAMUSCULAR; INTRAVENOUS at 15:56

## 2019-07-13 NOTE — H&P
[facility] Hospitalist Admission Note NAME:  Antonio Wu :   1945 MRN:  [id number] PCP:  Laurel Rosa MD  
 
Date/Time:  2019 4:15 PM 
 
  
  
Subjective: CHIEF COMPLAINT: Dizziness, fall HISTORY OF PRESENT ILLNESS:    
Ms. Ayaan Buck is a 72 y.o.  female with a hx of CAD, COPD, Afib, DM2, DVT, PAD, chronic wounds, obesity who presented to the Emergency Department complaining of GLF. Patient got up from seated position and ambulated across room, she allowed for some equilibration and was walking with her rollator. She became very dizzy and fell, striking the wall with her head and lacerating her right lower leg. No LOC, no post-fall sx, able to get up with some assistance. EMS arrived to find her hypotensive and transported to ER where her BP improved with volume resuscitation but due to ongoing UTI failing outpt treatment, she will be admitted for further management. Past Medical History:  
Diagnosis Date  Asthma  Atrial fibrillation ([address] 2018  Autoimmune disease ([facility] [facility]   
 fibromyalgia  CAD (coronary artery disease) 10/9/2015  Closed wedge compression fracture of T7 vertebra ([address] 2018  Diabetes ([facility] [address]  DVT (deep vein thrombosis) in pregnancy ([address]  GERD (gastroesophageal reflux disease)  HTN (hypertension)  NSTEMI (non-ST elevated myocardial infarction) ([address] 10/9/2015  Obesity (BMI 30-39.9) 2018  Sleep apnea   
 wears CPAP Past Surgical History:  
Procedure Laterality Date  ABDOMEN SURGERY PROC UNLISTED    
 hital hernia repair, gall bladder removed  BREAST SURGERY PROCEDURE UNLISTED    
 lumpectomy  CARDIAC SURG PROCEDURE UNLIST  10/9/15  
 2 stents [place]  HX COLOSTOMY    
 and reversal, post episiotomy repair [address] Man  HX UROLOGICAL  2009  
 bladder sling Social History Tobacco Use  
  Smoking status: Former Smoker Last attempt to quit: 3/30/2017 Years since quittin.2  Smokeless tobacco: Never Used Substance Use Topics  Alcohol use: No  
  Alcohol/week: 0.0 oz  
  Comment: very very rarely Family History Problem Relation Age of Onset  Diabetes Mother  Heart Failure Mother  Kidney Disease Mother  Diabetes Father  Heart Disease Father  Elevated Lipids Father  Heart Failure Father  Heart Disease Brother  Cancer Brother   
     kidney  Diabetes Brother  No Known Problems Brother  No Known Problems Brother Allergies Allergen Reactions  Advair Diskus [Fluticasone Propion-Salmeterol] Rash  Aspartame Other (comments)  Breo Ellipta [Fluticasone Furoate-Vilanterol] Other (comments)  Ciprofloxacin Rash  Statins-Hmg-Coa Reductase Inhibitors Other (comments) Muscle pain Lipitor/crestor/zocor  Sulfa (Sulfonamide Antibiotics) Rash  Tetracycline Other (comments) musclepain  Zetia [Ezetimibe] Myalgia Prior to Admission medications Medication Sig Start Date End Date Taking? Authorizing Provider  
predniSONE (DELTASONE) 10 mg tablet Take 10 mg by mouth daily (with breakfast). Provider, Historical  
apixaban (ELIQUIS) 5 mg tablet Take 1 Tab by mouth two (2) times a day. 19   Mayo Howard MD  
metoprolol tartrate (LOPRESSOR) 25 mg tablet Take 0.5 Tabs by mouth every twelve (12) hours. 19   Mayo Howard MD  
pantoprazole (PROTONIX) 40 mg tablet Take 1 Tab by mouth Daily (before dinner). 19   Mayo Howard MD  
potassium chloride SR (K-TAB) 20 mEq tablet Take 1 Tab by mouth two (2) times a day. 19   Mayo Howard MD  
clindamycin (CLEOCIN) 300 mg capsule Take 1 Cap by mouth four (4) times daily.  19   Mayo Howard MD  
Omeprazole delayed release (PRILOSEC D/R) 20 mg tablet Take 20 mg by mouth two (2) times a day. Provider, Historical  
ibandronate (BONIVA) 150 mg tablet Take 150 mg by mouth every thirty (30) days. Last dose 4/1/19    Provider, Historical  
lisinopril (PRINIVIL, ZESTRIL) 5 mg tablet Take 5 mg by mouth daily. Provider, Historical  
promethazine (PHENERGAN) 25 mg tablet Take 25 mg by mouth every eight (8) hours as needed for Nausea (Nausea not relieved by ondansetron). Provider, Historical  
mirtazapine (REMERON) 15 mg tablet Take 15 mg by mouth nightly. Provider, Historical  
magnesium oxide (MAG-OX) 400 mg tablet Take 1 Tab by mouth daily. 12/4/18   Loreto Ba MD  
ondansetron (ZOFRAN ODT) 4 mg disintegrating tablet Take 1 Tab by mouth every eight (8) hours as needed. 11/30/18   Tiffany Sanchez MD  
tiotropium (SPIRIVA WITH HANDIHALER) 18 mcg inhalation capsule Take 1 Cap by inhalation daily. Provider, Historical  
albuterol (PROVENTIL VENTOLIN) 2.5 mg /3 mL (0.083 %) nebulizer solution 2.5 mg by Nebulization route every six (6) hours as needed for Wheezing or Shortness of Breath. Provider, Historical  
montelukast (SINGULAIR) 10 mg tablet Take 10 mg by mouth nightly. Provider, Historical  
albuterol (PROAIR HFA) 90 mcg/actuation inhaler Take 1 Puff by inhalation every four (4) hours as needed. Provider, Historical  
clopidogrel (PLAVIX) 75 mg tab Take 75 mg by mouth daily. Provider, Historical  
lactobacillus rhamnosus gg 10 billion cell (CULTURELLE) 10 billion cell capsule Take 1 Cap by mouth daily. Provider, Historical  
biotin 10,000 mcg cap Take 1 Cap by mouth daily. Provider, Historical  
saxagliptin (ONGLYZA) 5 mg tab tablet Take 5 mg by mouth daily. Provider, Historical  
DULoxetine (CYMBALTA) 60 mg capsule Take 60 mg by mouth daily. Provider, Historical  
cholecalciferol (VITAMIN D3) 1,000 unit tablet Take 1,000 Units by mouth daily.     Provider, Historical  
 azelastine-fluticasone (DYMISTA) 137-50 mcg/spray spry 1 [place] by Nasal route daily. Provider, Historical  
mometasone-formoterol (DULERA) 200-5 mcg/actuation HFA inhaler Take 2 Puffs by inhalation two (2) times a day. Provider, Historical  
 
 
Review of Systems: 
(bold if positive, if negative) Gen:  Eyes:  ENT:  CVS:  dizzinessPulm:  dyspneaGI:  :  MS:  Skin:  woundPsych:  Endo:  Hem:  Renal:  Neuro:    
  
Objective: VITALS:   
Vital signs reviewed; most recent are: 
 
Visit Vitals /74 Pulse (!) 122 Temp 98 °F (36.7 °C) Resp 18 Ht 5' 6\" (1.676 m) Wt 99.8 kg (220 lb) SpO2 97% BMI 35.51 kg/m² SpO2 Readings from Last 6 Encounters:  
07/13/19 97% 06/28/19 98% 06/12/19 99% 05/24/19 94% 05/13/19 97% 04/29/19 95% No intake or output data in the 24 hours ending 07/13/19 1615 Exam:  
 
Physical Exam: 
 
Gen:  Obese, chronically ill-appearing, in no acute distress HEENT:  Pink conjunctivae, PERRL, hearing intact to voice, moist mucous membranes Neck:  Supple, without masses, thyroid non-tender Resp:  No accessory muscle use but speaks in broken sentences which is baseline, no wheezing or crackles Card:  No murmurs, normal S1, S2 without thrills, bruits or peripheral edema, +1/4 DP pulses bilaterally Abd:  Soft, non-tender, non-distended, normoactive bowel sounds are present, no palpable organomegaly and no detectable hernias Lymph:  No cervical or inguinal adenopathy Musc:  No cyanosis or clubbing Skin:  3 wounds on anterior L leg with dressing, 6 cm U-shaped laceration down to fatty tissue noted on right lateral calf Neuro:  Cranial nerves are grossly intact, no focal motor weakness, follows commands appropriately Psych:  Good insight, oriented to person, place and time, alert Labs: 
 
Recent Labs  
  07/13/19 
1317 WBC 17.1* HGB 8.8* HCT 30.9* * Recent Labs  
  07/13/19 
1317   
K 4.2  CO2 21  
GLU 96  
 BUN 19  
CREA 1.43* CA 8.3*  
MG 2.1 ALB 3.0* TBILI 0.4 SGOT 33 ALT 38 Lab Results Component Value Date/Time Glucose (POC) 89 06/28/2019 02:19 PM  
 Glucose (POC) 69 06/28/2019 02:02 PM  
 
No results for input(s): PH, PCO2, PO2, HCO3, FIO2 in the last 72 hours. No results for input(s): INR in the last 72 hours. No lab exists for component: INREXT All Micro Results Procedure Component Value Units Date/Time CULTURE, URINE [[id number]] Order Status:  Sent Specimen:  Urine from Clean catch CULTURE, URINE [[id number]] Order Status:  Canceled Specimen:  Urine from Clean catch URINE CULTURE HOLD SAMPLE [[id number]] Collected:  07/13/19 1405 Order Status:  Completed Specimen:  Urine from Serum Updated:  07/13/19 1407 Urine culture hold URINE ON HOLD IN MICROBIOLOGY DEPT FOR 3 DAYS. IF UNPRESERVED URINE IS SUBMITTED, IT CANNOT BE USED FOR ADDITIONAL TESTING AFTER 24 HRS, RECOLLECTION WILL BE REQUIRED. I have reviewed previous records Assessment and Plan:  
  
Principal Problem: 
 
Pre-syncope and collapse (7/13/2019) / Hypotension. POA. Not LOC or postural tone loss. 2/2 known postural hypotension and dizziness. Admit to telemetry. Update echo. IVFs. PT/OT/Fall precautions. Hold any anti-hypertensives for now Active Problems: 
  CAD (coronary artery disease) (10/9/2015). Stable. No CP. Holding BB/ACE-I due to hypotension. Continue plavix and eliquis Type 2 diabetes with nephropathy ([facility] [address] (10/1/2018). Hold saxcagliptin while inpatient. Start SSI per protocol. Diabetic diet and teaching. A1c not useful at this time Dyspnea (11/13/2018) / COPD. At baseline per patient. Continue ICS/LABA, PRN nebs, and chronic prednisone ARF (acute renal failure) ([facility] [address] (11/13/2018). Dx in question, she has previously had nml SCr levels but last two checks have been at 1.4 which suggests evolution of CKD. Monitor with hydration Obesity (BMI 30-39.9) (11/13/2018). Would benefit from weight loss Atrial fibrillation ([facility] [address] (11/16/2018). In sinus at this time (tachycardic but per patient this is baseline). Continue eliquis Gangrene of finger of right hand ([facility] [address] (5/17/2019). S/p amputation. Continue follow-up with vascular surgery UTI (urinary tract infection) /Sepsis. Failed outpatient therapy as she had been on macrobid for several days for pan-sensitive Klebsiella and UA remains dirty. IV CTX. Follow-up UCX. Normocytic anemia (7/13/2019). Slightly down from Luciana levels. Check serologies. Monitor with wounds and anticoagulation PAD (peripheral artery disease) / Leg wound, left. Has plans to undergo procedure to revascularize with Dr. Jesus Pierce on August 1, 2019. Cont eliquis and plavix. Has statin allergy Leg laceration, right, initial encounter (7/13/2019). Repaired by ER. Wound care should see if patient remains inpatient on Monday Telemetry reviewed:   Sinus tachycardia Risk of deterioration: high Total time spent with patient: 79 Minutes I reviewed chart, notes, data and current medications in the medical record. I have examined and treated the patient at bedside during this period. Care Plan discussed with: Patient, Nursing Staff and >50% of time spent in counseling and coordination of care Discussed:  Care Plan and D/C Planning      
___________________________________________________ Attending Physician: Gauri Rudolph DO

## 2019-07-13 NOTE — PROGRESS NOTES
Primary Nurse Chris Samuels RN and Reina Rivera RN performed a dual skin assessment on this patient Impairment noted- see wound doc flow sheet Darrion score is 22 L Calf  Blood blister, 2 abrasions R Calf Skin tear L Elbow Skin tear L Hip Ecchymosis Sacrum Ecchymosis R Buttocks small red area, blanchable

## 2019-07-13 NOTE — PROGRESS NOTES
[facility]: MED RECONCILIATION Comments/Recommendations:  
Patient alert and oriented at time of interview. Patient confirmed name and date of birth. Patient's preferred pharmacy is [facility]; however, patient would prefer paper prescriptions at discharge. Patient had list of her home medications on her mobile phone and verbally told medications to this pharmacist. 
 
Medications removed: 
 
Clindamycin: prescription completed about six weeks ago. Ondansetron ODT Pantoprazole Medications adjusted: 
 
Albuterol inhaler 2 puffs every 4 hours as needed Dymista nasal spray 2 sprays intranasally two times per day Cholecalciferol 2,000 units by mouth daily Magnesium oxide 400 mg by mouth nightly Metoprolol tartrate 12.5 mg by mouth two times per day as needed for SBP greater than 120 Montelukast 10 mg by mouth daily Potassium chloride 10 mEq by mouth two times per day Prednisone 10 mg by mouth two times per day Information obtained from: patient Allergies: Advair diskus [fluticasone propion-salmeterol]; Aspartame; Breo ellipta [fluticasone furoate-vilanterol]; Ciprofloxacin; Statins-hmg-coa reductase inhibitors; Sulfa (sulfonamide antibiotics); Tetracycline; and Zetia [ezetimibe] Prior to Admission Medications Prescriptions Last Dose Informant Patient Reported? Taking? DULoxetine (CYMBALTA) 60 mg capsule 2019 at Unknown time Self Yes Yes Sig: Take 60 mg by mouth daily. Omeprazole delayed release (PRILOSEC D/R) 20 mg tablet 2019 at AM Self Yes Yes Sig: Take 20 mg by mouth two (2) times a day. albuterol (PROAIR HFA) 90 mcg/actuation inhaler 2019 at Unknown time Self Yes Yes Sig: Take 2 Puffs by inhalation every four (4) hours as needed. albuterol (PROVENTIL VENTOLIN) 2.5 mg /3 mL (0.083 %) nebulizer solution 2019 at Unknown time Self Yes Yes Si.5 mg by Nebulization route every six (6) hours as needed for Wheezing or Shortness of Breath. apixaban (ELIQUIS) 5 mg tablet 2019 at AM Self No Yes Sig: Take 1 Tab by mouth two (2) times a day. azelastine-fluticasone (DYMISTA) 137-50 mcg/spray spry 2019 at AM Self Yes Yes Si Sprays by Nasal route two (2) times a day. biotin 10,000 mcg cap 2019 at Unknown time Self Yes Yes Sig: Take 1 Cap by mouth daily. cholecalciferol (VITAMIN D3) 1,000 unit tablet 2019 at Unknown time Self Yes Yes Sig: Take 2,000 Units by mouth daily. clopidogrel (PLAVIX) 75 mg tab 2019 at Unknown time Self Yes Yes Sig: Take 75 mg by mouth daily. ibandronate (BONIVA) 150 mg tablet 2019 Self Yes Yes Sig: Take 150 mg by mouth every thirty (30) days. Last dose 19  
lactobacillus rhamnosus gg 10 billion cell (CULTURELLE) 10 billion cell capsule 2019 at Unknown time Self Yes Yes Sig: Take 1 Cap by mouth daily. lisinopril (PRINIVIL, ZESTRIL) 5 mg tablet 2019 at Unknown time Self Yes Yes Sig: Take 5 mg by mouth daily. magnesium oxide (MAG-OX) 400 mg tablet 2019 at Unknown time Self Yes Yes Sig: Take 400 mg by mouth nightly. metoprolol tartrate (LOPRESSOR) 25 mg tablet  Self Yes Yes Sig: Take 12.5 mg by mouth two (2) times daily as needed (SBP greater than 120). mirtazapine (REMERON) 15 mg tablet 2019 at Unknown time Self Yes Yes Sig: Take 15 mg by mouth nightly. mometasone-formoterol (DULERA) 200-5 mcg/actuation HFA inhaler 2019 at AM Self Yes Yes Sig: Take 2 Puffs by inhalation two (2) times a day. montelukast (SINGULAIR) 10 mg tablet 2019 at Unknown time Self Yes Yes Sig: Take 10 mg by mouth daily. potassium chloride SR (KLOR-CON 10) 10 mEq tablet 2019 at AM Self Yes Yes Sig: Take 10 mEq by mouth two (2) times a day. predniSONE (DELTASONE) 10 mg tablet 2019 at AM Self Yes Yes Sig: Take 10 mg by mouth two (2) times daily (with meals). promethazine (PHENERGAN) 25 mg tablet 2019 Self Yes Yes Sig: Take 25 mg by mouth every eight (8) hours as needed for Nausea (Nausea not relieved by ondansetron). saxagliptin (ONGLYZA) 5 mg tab tablet 7/13/2019 at Unknown time Self Yes Yes Sig: Take 5 mg by mouth daily. tiotropium (SPIRIVA WITH HANDIHALER) 18 mcg inhalation capsule 7/12/2019 at Unknown time Self Yes Yes Sig: Take 1 Cap by inhalation daily.   
  
 
Leanne Connor, Pharmacist

## 2019-07-13 NOTE — ED PROVIDER NOTES
72 y.o. female with past medical history significant for DVT, HTN, CAD, NSTEMI, diabetes, COPD, fibromyalgia, asthma, GERD, a-fib, and sleep apne who presents from [facility] via EMS with chief complaint of dizziness s/p GLF. Patient had GLF after \"feeling dizzy\" today. She hit her head on drywall and lacerated her right calf when she fell. Patient suspects her BP \"must have dropped\" - systolic BP was 75 en route. Patient still feels dizzy when she tried to stand but is okay when laying. Of note, patient is short of breath at baseline due to her COPD, and she has chronic wounds on left leg due to venous stasis. She also notes her heart rate is typically between 100-110 bpm. Her last tetanus shot was 03/2019. She takes Eliquis and has  3 stents. Specifically denies chest pain, nausea, diarrhea, diaphoresis, syncope, and any other pain or symptoms. There are no other acute medical concerns at this time. Social hx: Former tobacco smoker (Quit 2017); Rare EtOH use; Denies illicit drug use PCP: Tana Sandoval MD 
 
Note written by Sandra Narvaez, as dictated by SALIMA Finney 1:02 PM 
 
The history is provided by the patient and medical records. No  was used. Past Medical History:  
Diagnosis Date  Asthma  Atrial fibrillation ([address] 11/16/2018  Autoimmune disease ([address]   
 fibromyalgia  CAD (coronary artery disease) 10/9/2015  Closed wedge compression fracture of T7 vertebra ([address] 11/13/2018  Diabetes ([address]  DVT (deep vein thrombosis) in pregnancy ([address]  GERD (gastroesophageal reflux disease)  HTN (hypertension)  NSTEMI (non-ST elevated myocardial infarction) ([address] 10/9/2015  Obesity (BMI 30-39.9) 11/13/2018  Sleep apnea   
 wears CPAP Past Surgical History:  
Procedure Laterality Date  ABDOMEN SURGERY PROC UNLISTED    
 hital hernia repair, gall bladder removed  BREAST SURGERY PROCEDURE UNLISTED    
 lumpectomy  CARDIAC SURG PROCEDURE UNLIST  10/9/15  
 2 stents [place]  HX COLOSTOMY    
 and reversal, post episiotomy repair [address]  HX UROLOGICAL  2009  
 bladder sling Family History:  
Problem Relation Age of Onset  Diabetes Mother  Heart Failure Mother  Kidney Disease Mother  Diabetes Father  Heart Disease Father  Elevated Lipids Father  Heart Failure Father  Heart Disease Brother  Cancer Brother   
     kidney  Diabetes Brother  No Known Problems Brother  No Known Problems Brother Social History Socioeconomic History  Marital status:  Spouse name: Not on file  Number of children: Not on file  Years of education: Not on file  Highest education level: Not on file Occupational History  Not on file Social Needs  Financial resource strain: Not on file  Food insecurity:  
  Worry: Not on file Inability: Not on file  Transportation needs:  
  Medical: Not on file Non-medical: Not on file Tobacco Use  Smoking status: Former Smoker Last attempt to quit: 3/30/2017 Years since quittin.2  Smokeless tobacco: Never Used Substance and Sexual Activity  Alcohol use: No  
  Alcohol/week: 0.0 oz  
  Comment: very very rarely  Drug use: No  
 Sexual activity: Never Lifestyle  Physical activity:  
  Days per week: Not on file Minutes per session: Not on file  Stress: Not on file Relationships  Social connections:  
  Talks on phone: Not on file Gets together: Not on file Attends Yazdanism service: Not on file Active member of club or organization: Not on file Attends meetings of clubs or organizations: Not on file Relationship status: Not on file  Intimate partner violence:  
  Fear of current or ex partner: Not on file Emotionally abused: Not on file Physically abused: Not on file Forced sexual activity: Not on file Other Topics Concern  Not on file Social History Narrative  Not on file ALLERGIES: Advair diskus [fluticasone propion-salmeterol]; Aspartame; Breo ellipta [fluticasone furoate-vilanterol]; Ciprofloxacin; Statins-hmg-coa reductase inhibitors; Sulfa (sulfonamide antibiotics); Tetracycline; and Zetia [ezetimibe] Review of Systems Constitutional: Negative for chills and fever. HENT: Negative for congestion, rhinorrhea, sneezing and sore throat. Eyes: Negative for redness and visual disturbance. Respiratory: Negative for shortness of breath. Cardiovascular: Negative for chest pain and leg swelling. Gastrointestinal: Negative for abdominal pain, nausea and vomiting. Genitourinary: Negative for difficulty urinating and frequency. Musculoskeletal: Negative for back pain, myalgias and neck stiffness. Skin: Positive for wound. Negative for rash. Neurological: Positive for light-headedness and headaches. Negative for dizziness, syncope and weakness. Hematological: Negative for adenopathy. All other systems reviewed and are negative. Patient Vitals for the past 12 hrs: 
 Temp Pulse Resp BP SpO2  
07/13/19 1658  (!) 117     
07/13/19 1500  (!) 122  126/74 97 % 07/13/19 1445  (!) 117  111/69 96 % 07/13/19 1430  (!) 110  107/68 98 % 07/13/19 1415  (!) 111  115/77 98 % 07/13/19 1345  (!) 111  118/61 99 % 07/13/19 1258 98 °F (36.7 °C) (!) 115 18 134/78 100 % Physical Exam  
Constitutional: She is oriented to person, place, and time. She appears well-developed and well-nourished. No distress. HENT:  
Head: Normocephalic. Right Ear: External ear normal.  
Left Ear: External ear normal.  
ttp with sts to occiput. No lesions noted Eyes: Pupils are equal, round, and reactive to light. EOM are normal.  
Neck: Neck supple. No JVD present. No tracheal deviation present. Non-tender to midline and throughout. No swelling or step off. No discoloration or deformity. No lesions. Moves neck full ROM without diff against resistance.  5/5 bilaterally. Cardiovascular: Normal rate, regular rhythm, normal heart sounds and intact distal pulses. Exam reveals no gallop and no friction rub. No murmur heard. Pulmonary/Chest: Effort normal and breath sounds normal. No stridor. No respiratory distress. She has no wheezes. She has no rales. She exhibits no tenderness. Coarse bs no wheezes or rales Abdominal: Soft. Bowel sounds are normal. She exhibits no distension and no mass. There is no tenderness. There is no rebound and no guarding. No hernia. Musculoskeletal: Normal range of motion. She exhibits no edema, tenderness or deformity. Back: Non-tender to midline and throughout no swelling or step off. No discoloration. No deformity or lesions. Distal n/v intact. Cap refill brisk Lymphadenopathy:  
  She has no cervical adenopathy. Neurological: She is alert and oriented to person, place, and time. No cranial nerve deficit. Coordination normal.  
Skin: Skin is warm and dry. Capillary refill takes less than 2 seconds. No rash noted. No erythema. No pallor. Multiple chronic wounds to left leg. + 8 cm curved laceration to posterior right calf. Psychiatric: She has a normal mood and affect. Her behavior is normal.  
Nursing note and vitals reviewed. MDM Number of Diagnoses or Management Options Amount and/or Complexity of Data Reviewed Clinical lab tests: ordered and reviewed Tests in the radiology section of CPT®: ordered and reviewed Tests in the medicine section of CPT®: reviewed and ordered Obtain history from someone other than the patient: yes (ems) Review and summarize past medical records: yes Independent visualization of images, tracings, or specimens: yes Patient Progress Patient progress: stable Procedures 1:15 PM 
 Discussed pt, sx, hx and current findings with Pedrito Lange MD. He is in agreement with plan and will see pt. Will get labs, ekg, urine and ct head/ cerv. Will apply let and suture wound. Will continue to monitor pt 
Evita Chacon. TATE Jhaveri 
 
ED EKG interpretation: 1:47 PM 
Rhythm: sinus tachycardia, LAFB; and regular . Rate (approx.): 111; Axis: normal; P wave: normal; QRS interval: normal ; ST/T wave: normal; Other findings: abnormal ekg unchanged from 6/28/19 . This EKG was interpreted by Pedrito Lange MD,ED Provider. 3:09 PM  
Dr Hagen Samples in to see pt. Pt with wbc elevated, uti, failed op from recent uti. Tachycardia, hypotension. Annetta Trasamantha, will admit Evita Chacon. TATE Jhaveri Hospitalist Tian[place] for Admission 3:11 PM 
 
ED Room Number: 332/01 Patient Name and age:  Antonio Wu 72 y.o.  female Working Diagnosis: 1. Acute UTI 2. Fall, initial encounter 3. NAIDA (acute kidney injury) ([facility] 4. Injury of head, initial encounter 5. Laceration of right lower leg, initial encounter 6. Chronic anticoagulation Readmission: no 
Isolation Requirements:  no 
Recommended Level of Care:  telemetry Code Status:  full Other:  Failed recent abx tx Procedure Note - Laceration Repair: 
3:12 PM 
Procedure by Evita Chacon. TATE Jhaveri. Complexity: complex 8 cm v-shaped laceration to lower leg  was irrigated copiously with NS under jet lavage, prepped with surecleans and draped in a sterile fashion. The area was anesthetized with 5 mLs of  LET via topical application and 8mL lidocaine with epinephrine. .  The wound was explored with the following results: No foreign bodies found, No tendon laceration seen. The wound was repaired with One layer suture closure: Skin Layer:  10 sutures placed, stitch type:simple interrupted, suture: 3-0 nylon. .  The wound was closed with good hemostasis and approximation. Sterile dressing applied. Estimated blood loss: less than 2mL The procedure took 18 minutes, and pt tolerated well. LABS COMPLETED AND REVIEWED: 
Recent Results (from the past 12 hour(s)) SAMPLES BEING HELD Collection Time: 07/13/19  1:17 PM  
Result Value Ref Range SAMPLES BEING HELD SST,RED,BLUE   
 COMMENT Add-on orders for these samples will be processed based on acceptable specimen integrity and analyte stability, which may vary by analyte. CBC WITH AUTOMATED DIFF Collection Time: 07/13/19  1:17 PM  
Result Value Ref Range WBC 17.1 (H) 3.6 - 11.0 K/uL  
 RBC 3.69 (L) 3.80 - 5.20 M/uL HGB 8.8 (L) 11.5 - 16.0 g/dL HCT 30.9 (L) 35.0 - 47.0 % MCV 83.7 80.0 - 99.0 FL  
 MCH 23.8 (L) 26.0 - 34.0 PG  
 MCHC 28.5 (L) 30.0 - 36.5 g/dL  
 RDW 18.3 (H) 11.5 - 14.5 % PLATELET 261 (H) 305 - 400 K/uL MPV 9.4 8.9 - 12.9 FL  
 NRBC 0.0 0  WBC ABSOLUTE NRBC 0.00 0.00 - 0.01 K/uL NEUTROPHILS 84 (H) 32 - 75 % LYMPHOCYTES 9 (L) 12 - 49 % MONOCYTES 6 5 - 13 % EOSINOPHILS 0 0 - 7 % BASOPHILS 0 0 - 1 % IMMATURE GRANULOCYTES 1 (H) 0.0 - 0.5 % ABS. NEUTROPHILS 14.4 (H) 1.8 - 8.0 K/UL  
 ABS. LYMPHOCYTES 1.5 0.8 - 3.5 K/UL  
 ABS. MONOCYTES 1.0 0.0 - 1.0 K/UL  
 ABS. EOSINOPHILS 0.0 0.0 - 0.4 K/UL  
 ABS. BASOPHILS 0.0 0.0 - 0.1 K/UL  
 ABS. IMM. GRANS. 0.2 (H) 0.00 - 0.04 K/UL  
 RBC COMMENTS ANISOCYTOSIS 1+ 
    
 RBC COMMENTS OVALOCYTES PRESENT 
    
 RBC COMMENTS SCHISTOCYTES PRESENT 
    
 WBC COMMENTS TOXIC GRANULATION    
 DF SMEAR SCANNED    
METABOLIC PANEL, COMPREHENSIVE Collection Time: 07/13/19  1:17 PM  
Result Value Ref Range Sodium 136 136 - 145 mmol/L Potassium 4.2 3.5 - 5.1 mmol/L Chloride 106 97 - 108 mmol/L  
 CO2 21 21 - 32 mmol/L Anion gap 9 5 - 15 mmol/L Glucose 96 65 - 100 mg/dL BUN 19 6 - 20 MG/DL Creatinine 1.43 (H) 0.55 - 1.02 MG/DL  
 BUN/Creatinine ratio 13 12 - 20 GFR est AA 44 (L) >60 ml/min/1.73m2 GFR est non-AA 36 (L) >60 ml/min/1.73m2 Calcium 8.3 (L) 8.5 - 10.1 MG/DL Bilirubin, total 0.4 0.2 - 1.0 MG/DL  
 ALT (SGPT) 38 12 - 78 U/L  
 AST (SGOT) 33 15 - 37 U/L Alk. phosphatase 127 (H) 45 - 117 U/L Protein, total 6.9 6.4 - 8.2 g/dL Albumin 3.0 (L) 3.5 - 5.0 g/dL Globulin 3.9 2.0 - 4.0 g/dL A-G Ratio 0.8 (L) 1.1 - 2.2    
TROPONIN I Collection Time: 07/13/19  1:17 PM  
Result Value Ref Range Troponin-I, Qt. <0.05 <0.05 ng/mL MAGNESIUM Collection Time: 07/13/19  1:17 PM  
Result Value Ref Range Magnesium 2.1 1.6 - 2.4 mg/dL EKG, 12 LEAD, INITIAL Collection Time: 07/13/19  1:47 PM  
Result Value Ref Range Ventricular Rate 111 BPM  
 Atrial Rate 111 BPM  
 P-R Interval 132 ms QRS Duration 76 ms  
 Q-T Interval 336 ms  
 QTC Calculation (Bezet) 456 ms Calculated P Axis 68 degrees Calculated R Axis -60 degrees Calculated T Axis 31 degrees Diagnosis Sinus tachycardia Left anterior fascicular block Abnormal ECG When compared with ECG of 28-JUN-2019 16:08, No significant change was found URINALYSIS W/MICROSCOPIC Collection Time: 07/13/19  2:05 PM  
Result Value Ref Range Color YELLOW/STRAW Appearance CLOUDY (A) CLEAR Specific gravity 1.006 1.003 - 1.030    
 pH (UA) 5.5 5.0 - 8.0 Protein NEGATIVE  NEG mg/dL Glucose NEGATIVE  NEG mg/dL Ketone NEGATIVE  NEG mg/dL Bilirubin NEGATIVE  NEG Blood TRACE (A) NEG Urobilinogen 0.2 0.2 - 1.0 EU/dL Nitrites POSITIVE (A) NEG Leukocyte Esterase LARGE (A) NEG    
 WBC  0 - 4 /hpf  
 RBC 0-5 0 - 5 /hpf Epithelial cells FEW FEW /lpf Bacteria 4+ (A) NEG /hpf Hyaline cast 2-5 0 - 5 /lpf URINE CULTURE HOLD SAMPLE Collection Time: 07/13/19  2:05 PM  
Result Value Ref Range Urine culture hold URINE ON HOLD IN MICROBIOLOGY DEPT FOR 3 DAYS. IF UNPRESERVED URINE IS SUBMITTED, IT CANNOT BE USED FOR ADDITIONAL TESTING AFTER 24 HRS, RECOLLECTION WILL BE REQUIRED. IMAGING COMPLETED AND REVIEWED: 
The following have been ordered and reviewed: 
 
Ct Head Wo Cont Result Date: 7/13/2019 EXAM:  CT HEAD WO CONT INDICATION: Fall. Dizziness. COMPARISON: None. TECHNIQUE: Axial noncontrast head CT from foramen magnum to vertex. Coronal and sagittal reformatted images were obtained. CT dose reduction was achieved through use of a standardized protocol tailored for this examination and automatic exposure control for dose modulation. FINDINGS:  There is diffuse age-related parenchymal volume loss. The ventricles and sulci are age-appropriate without hydrocephalus. There is no mass effect or midline shift. There is no intracranial hemorrhage or extra-axial fluid collection. Scattered foci of low attenuation in the periventricular white matter most likely represent age-indeterminate microvascular ischemic changes. The gray-white matter differentiation is maintained. The basal cisterns are patent. The osseous structures are intact. The visualized paranasal sinuses and mastoid air cells are clear. IMPRESSION: 1. No acute intracranial hemorrhage. 2. Age-indeterminate microvascular ischemic disease in the periventricular white matter. Ct Spine Cerv Wo Cont Result Date: 7/13/2019 EXAM:  CT CERVICAL SPINE WITHOUT CONTRAST INDICATION: Neck pain. Fall. COMPARISON: None. CONTRAST:  None. TECHNIQUE: Multislice helical CT of the cervical spine was performed without intravenous contrast administration. Sagittal and coronal reconstructions were generated. CT dose reduction was achieved through use of a standardized protocol tailored for this examination and automatic exposure control for dose modulation. FINDINGS: There is no acute fracture or subluxation. Vertebral body heights are maintained. There is levoconvex curvature of the spine without abnormality in the AP alignment.  There is intervertebral disc space narrowing at C5-6 with a posterior disc osteophyte complex resulting in moderate spinal canal stenosis and mild bilateral foraminal stenosis. The paraspinal soft tissues are unremarkable. The visualized lung apices are clear. IMPRESSION: No acute abnormality. Degenerative changes at C5-6 with moderate spinal canal stenosis and mild bilateral foraminal stenosis. MEDICATIONS GIVEN: 
Medications  
lidocaine/EPINEPHrine/tetracaine/methylcellulose (LET) topical gel gel (has no administration in time range)  
sodium chloride (NS) flush 5-40 mL (has no administration in time range)  
sodium chloride (NS) flush 5-40 mL (has no administration in time range) 0.9% sodium chloride infusion (has no administration in time range)  
cefTRIAXone (ROCEPHIN) 1 g in 0.9% sodium chloride (MBP/ADV) 50 mL (has no administration in time range)  
acetaminophen (TYLENOL) tablet 650 mg (has no administration in time range) HYDROcodone-acetaminophen (NORCO) 5-325 mg per tablet 1 Tab (has no administration in time range)  
naloxone [facility]) injection 0.4 mg (has no administration in time range)  
ondansetron (ZOFRAN) injection 4 mg (4 mg IntraVENous Given 7/13/19 4756) bisacodyl (DULCOLAX) tablet 5 mg (has no administration in time range) lidocaine-EPINEPHrine (PF) (XYLOCAINE) 1 %-1:200,000 injection (has no administration in time range) budesonide (PULMICORT) 500 mcg/2 ml nebulizer suspension (has no administration in time range)  
arformoterol (BROVANA) neb solution 15 mcg (has no administration in time range)  
albuterol-ipratropium (DUO-NEB) 2.5 MG-0.5 MG/3 ML (has no administration in time range) glucose chewable tablet 16 g (has no administration in time range) glucagon (GLUCAGEN) injection 1 mg (has no administration in time range) dextrose 10% infusion 125-250 mL (has no administration in time range)  
insulin lispro (HUMALOG) injection (has no administration in time range) apixaban (ELIQUIS) tablet 5 mg (has no administration in time range)  
cholecalciferol (VITAMIN D3) tablet 1,000 Units (has no administration in time range) clopidogrel (PLAVIX) tablet 75 mg (has no administration in time range) DULoxetine (CYMBALTA) capsule 60 mg (has no administration in time range)  
mirtazapine (REMERON) tablet 15 mg (has no administration in time range)  
montelukast (SINGULAIR) tablet 10 mg (has no administration in time range)  
pantoprazole (PROTONIX) tablet 40 mg (has no administration in time range) predniSONE (DELTASONE) tablet 10 mg (has no administration in time range) lidocaine/EPINEPHrine/tetracaine/methylcellulose (LET) topical gel gel 2 mL (2 mL Topical Given 7/13/19 1311) cefTRIAXone (ROCEPHIN) 1 g in sterile water (preservative free) 10 mL IV syringe (1 g IntraVENous Given 7/13/19 1511)  
traMADol (ULTRAM) tablet 50 mg (50 mg Oral Given 7/13/19 1556) CLINICAL IMPRESSION: 
1. Acute UTI 2. Fall, initial encounter 3. NAIDA (acute kidney injury) ([address] 4. Injury of head, initial encounter 5. Laceration of right lower leg, initial encounter 6. Chronic anticoagulation Plan 1. Admission per Hospitalist 
 
 
3:12 PM 
The patient is being admitted to the hospital.  The results of their tests and reasons for their admission have been discussed with them and/or available family. The patient/family has conveyed agreement and understanding for the need to be admitted and for their admission diagnosis. Consultation has been made with the inpatient physician specialist for hospitalization.

## 2019-07-13 NOTE — ED TRIAGE NOTES
Pt arrives via EMS from [facility] after experiencing a GLF 2/2 dizziness. Pt reports hitting head on drywall, denies LOC. Takes Eliquis and plavix. C/O lac on left calf. Last tetatnus was in March of this year.

## 2019-07-13 NOTE — ROUTINE PROCESS
TRANSFER - OUT REPORT: 
 
Verbal report given to Leanne Pineda on Gerald Neighbor  being transferred to [address] [facility] for routine progression of care Report consisted of patients Situation, Background, Assessment and  
Recommendations(SBAR). Information from the following report(s) SBAR, ED Summary, [facility] and Recent Results was reviewed with the receiving nurse. Opportunity for questions and clarification was provided.

## 2019-07-14 ENCOUNTER — APPOINTMENT (OUTPATIENT)
Dept: NON INVASIVE DIAGNOSTICS | Age: 74
DRG: 872 | End: 2019-07-14
Attending: INTERNAL MEDICINE
Payer: MEDICARE

## 2019-07-14 LAB
ANION GAP SERPL CALC-SCNC: 7 MMOL/L (ref 5–15)
ATRIAL RATE: 111 BPM
BASOPHILS # BLD: 0 K/UL (ref 0–0.1)
BASOPHILS NFR BLD: 0 % (ref 0–1)
BUN SERPL-MCNC: 21 MG/DL (ref 6–20)
BUN/CREAT SERPL: 14 (ref 12–20)
CALCIUM SERPL-MCNC: 7.6 MG/DL (ref 8.5–10.1)
CALCULATED P AXIS, ECG09: 68 DEGREES
CALCULATED R AXIS, ECG10: -60 DEGREES
CALCULATED T AXIS, ECG11: 31 DEGREES
CHLORIDE SERPL-SCNC: 110 MMOL/L (ref 97–108)
CO2 SERPL-SCNC: 22 MMOL/L (ref 21–32)
CREAT SERPL-MCNC: 1.55 MG/DL (ref 0.55–1.02)
DIAGNOSIS, 93000: NORMAL
DIFFERENTIAL METHOD BLD: ABNORMAL
ECHO AV AREA PEAK VELOCITY: 2.4 CM2
ECHO AV AREA/BSA PEAK VELOCITY: 1.1 CM2/M2
ECHO AV PEAK GRADIENT: 10.6 MMHG
ECHO AV PEAK VELOCITY: 162.46 CM/S
ECHO EST RA PRESSURE: 3 MMHG
ECHO LA AREA 4C: 19.7 CM2
ECHO LA MAJOR AXIS: 3.66 CM
ECHO LA VOL 2C: 39.92 ML (ref 22–52)
ECHO LA VOL 4C: 58.6 ML (ref 22–52)
ECHO LA VOL BP: 53.82 ML (ref 22–52)
ECHO LA VOL/BSA BIPLANE: 25.84 ML/M2 (ref 16–28)
ECHO LA VOLUME INDEX A2C: 19.17 ML/M2 (ref 16–28)
ECHO LA VOLUME INDEX A4C: 28.13 ML/M2 (ref 16–28)
ECHO LV E' LATERAL VELOCITY: 8.62 CENTIMETER/SECOND
ECHO LV E' SEPTAL VELOCITY: 8.46 CENTIMETER/SECOND
ECHO LV INTERNAL DIMENSION DIASTOLIC: 4.55 CM (ref 3.9–5.3)
ECHO LV INTERNAL DIMENSION SYSTOLIC: 3.18 CM
ECHO LV IVSD: 1.23 CM (ref 0.6–0.9)
ECHO LV MASS 2D: 230.8 G (ref 67–162)
ECHO LV MASS INDEX 2D: 110.8 G/M2 (ref 43–95)
ECHO LV POSTERIOR WALL DIASTOLIC: 1.13 CM (ref 0.6–0.9)
ECHO LVOT DIAM: 2.1 CM
ECHO LVOT PEAK GRADIENT: 5 MMHG
ECHO LVOT PEAK VELOCITY: 112.14 CM/S
ECHO MV A VELOCITY: 133.47 CM/S
ECHO MV AREA PHT: 4.8 CM2
ECHO MV E DECELERATION TIME (DT): 158.8 MS
ECHO MV E VELOCITY: 98.45 CM/S
ECHO MV E/A RATIO: 0.74
ECHO MV PRESSURE HALF TIME (PHT): 46.1 MS
ECHO MV REGURGITANT PEAK GRADIENT: 104.5 MMHG
ECHO MV REGURGITANT PEAK VELOCITY: 511.22 CM/S
ECHO PV MAX VELOCITY: 115.75 CM/S
ECHO PV PEAK GRADIENT: 5.4 MMHG
ECHO RV INTERNAL DIMENSION: 3.8 CM
ECHO RV TAPSE: 1.66 CM (ref 1.5–2)
EOSINOPHIL # BLD: 0 K/UL (ref 0–0.4)
EOSINOPHIL NFR BLD: 0 % (ref 0–7)
ERYTHROCYTE [DISTWIDTH] IN BLOOD BY AUTOMATED COUNT: 18.3 % (ref 11.5–14.5)
GLUCOSE BLD STRIP.AUTO-MCNC: 100 MG/DL (ref 65–100)
GLUCOSE BLD STRIP.AUTO-MCNC: 105 MG/DL (ref 65–100)
GLUCOSE BLD STRIP.AUTO-MCNC: 198 MG/DL (ref 65–100)
GLUCOSE BLD STRIP.AUTO-MCNC: 199 MG/DL (ref 65–100)
GLUCOSE SERPL-MCNC: 111 MG/DL (ref 65–100)
HCT VFR BLD AUTO: 26.8 % (ref 35–47)
HGB BLD-MCNC: 7.7 G/DL (ref 11.5–16)
IMM GRANULOCYTES # BLD AUTO: 0.1 K/UL (ref 0–0.04)
IMM GRANULOCYTES NFR BLD AUTO: 1 % (ref 0–0.5)
LYMPHOCYTES # BLD: 1.1 K/UL (ref 0.8–3.5)
LYMPHOCYTES NFR BLD: 9 % (ref 12–49)
MAGNESIUM SERPL-MCNC: 2.1 MG/DL (ref 1.6–2.4)
MCH RBC QN AUTO: 23.8 PG (ref 26–34)
MCHC RBC AUTO-ENTMCNC: 28.7 G/DL (ref 30–36.5)
MCV RBC AUTO: 82.7 FL (ref 80–99)
MONOCYTES # BLD: 0.7 K/UL (ref 0–1)
MONOCYTES NFR BLD: 6 % (ref 5–13)
NEUTS SEG # BLD: 9.8 K/UL (ref 1.8–8)
NEUTS SEG NFR BLD: 84 % (ref 32–75)
NRBC # BLD: 0 K/UL (ref 0–0.01)
NRBC BLD-RTO: 0 PER 100 WBC
P-R INTERVAL, ECG05: 132 MS
PHOSPHATE SERPL-MCNC: 3.4 MG/DL (ref 2.6–4.7)
PLATELET # BLD AUTO: 346 K/UL (ref 150–400)
PMV BLD AUTO: 9.6 FL (ref 8.9–12.9)
POTASSIUM SERPL-SCNC: 4.8 MMOL/L (ref 3.5–5.1)
Q-T INTERVAL, ECG07: 336 MS
QRS DURATION, ECG06: 76 MS
QTC CALCULATION (BEZET), ECG08: 456 MS
RBC # BLD AUTO: 3.24 M/UL (ref 3.8–5.2)
RBC MORPH BLD: ABNORMAL
RBC MORPH BLD: ABNORMAL
SERVICE CMNT-IMP: ABNORMAL
SERVICE CMNT-IMP: NORMAL
SODIUM SERPL-SCNC: 139 MMOL/L (ref 136–145)
VENTRICULAR RATE, ECG03: 111 BPM
WBC # BLD AUTO: 11.7 K/UL (ref 3.6–11)

## 2019-07-14 PROCEDURE — 97165 OT EVAL LOW COMPLEX 30 MIN: CPT

## 2019-07-14 PROCEDURE — 94760 N-INVAS EAR/PLS OXIMETRY 1: CPT

## 2019-07-14 PROCEDURE — 83735 ASSAY OF MAGNESIUM: CPT

## 2019-07-14 PROCEDURE — 74011250636 HC RX REV CODE- 250/636: Performed by: FAMILY MEDICINE

## 2019-07-14 PROCEDURE — 74011250637 HC RX REV CODE- 250/637: Performed by: INTERNAL MEDICINE

## 2019-07-14 PROCEDURE — 85025 COMPLETE CBC W/AUTO DIFF WBC: CPT

## 2019-07-14 PROCEDURE — 74011000258 HC RX REV CODE- 258: Performed by: INTERNAL MEDICINE

## 2019-07-14 PROCEDURE — 74011250636 HC RX REV CODE- 250/636: Performed by: INTERNAL MEDICINE

## 2019-07-14 PROCEDURE — 94762 N-INVAS EAR/PLS OXIMTRY CONT: CPT

## 2019-07-14 PROCEDURE — 65660000000 HC RM CCU STEPDOWN

## 2019-07-14 PROCEDURE — 36415 COLL VENOUS BLD VENIPUNCTURE: CPT

## 2019-07-14 PROCEDURE — 97161 PT EVAL LOW COMPLEX 20 MIN: CPT

## 2019-07-14 PROCEDURE — 97535 SELF CARE MNGMENT TRAINING: CPT

## 2019-07-14 PROCEDURE — 84100 ASSAY OF PHOSPHORUS: CPT

## 2019-07-14 PROCEDURE — C8929 TTE W OR WO FOL WCON,DOPPLER: HCPCS

## 2019-07-14 PROCEDURE — 74011000250 HC RX REV CODE- 250: Performed by: INTERNAL MEDICINE

## 2019-07-14 PROCEDURE — 97116 GAIT TRAINING THERAPY: CPT

## 2019-07-14 PROCEDURE — 94640 AIRWAY INHALATION TREATMENT: CPT

## 2019-07-14 PROCEDURE — 82962 GLUCOSE BLOOD TEST: CPT

## 2019-07-14 PROCEDURE — 74011636637 HC RX REV CODE- 636/637: Performed by: INTERNAL MEDICINE

## 2019-07-14 PROCEDURE — 80048 BASIC METABOLIC PNL TOTAL CA: CPT

## 2019-07-14 RX ADMIN — HYDROCODONE BITARTRATE AND ACETAMINOPHEN 1 TABLET: 5; 325 TABLET ORAL at 15:01

## 2019-07-14 RX ADMIN — DULOXETINE HYDROCHLORIDE 60 MG: 30 CAPSULE, DELAYED RELEASE ORAL at 10:14

## 2019-07-14 RX ADMIN — PERFLUTREN 2 ML: 6.52 INJECTION, SUSPENSION INTRAVENOUS at 09:34

## 2019-07-14 RX ADMIN — HYDROCODONE BITARTRATE AND ACETAMINOPHEN 1 TABLET: 5; 325 TABLET ORAL at 06:00

## 2019-07-14 RX ADMIN — VITAMIN D, TAB 1000IU (100/BT) 2000 UNITS: 25 TAB at 10:14

## 2019-07-14 RX ADMIN — Medication 10 ML: at 15:02

## 2019-07-14 RX ADMIN — APIXABAN 5 MG: 5 TABLET, FILM COATED ORAL at 17:12

## 2019-07-14 RX ADMIN — CLOPIDOGREL BISULFATE 75 MG: 75 TABLET ORAL at 10:14

## 2019-07-14 RX ADMIN — APIXABAN 5 MG: 5 TABLET, FILM COATED ORAL at 10:14

## 2019-07-14 RX ADMIN — CEFTRIAXONE SODIUM 1 G: 1 INJECTION, POWDER, FOR SOLUTION INTRAMUSCULAR; INTRAVENOUS at 15:01

## 2019-07-14 RX ADMIN — ARFORMOTEROL TARTRATE 15 MCG: 15 SOLUTION RESPIRATORY (INHALATION) at 07:12

## 2019-07-14 RX ADMIN — MONTELUKAST SODIUM 10 MG: 10 TABLET, FILM COATED ORAL at 10:14

## 2019-07-14 RX ADMIN — PREDNISONE 10 MG: 5 TABLET ORAL at 17:12

## 2019-07-14 RX ADMIN — PANTOPRAZOLE SODIUM 40 MG: 40 TABLET, DELAYED RELEASE ORAL at 17:12

## 2019-07-14 RX ADMIN — BUDESONIDE 500 MCG: 0.5 INHALANT RESPIRATORY (INHALATION) at 19:21

## 2019-07-14 RX ADMIN — INSULIN LISPRO 2 UNITS: 100 INJECTION, SOLUTION INTRAVENOUS; SUBCUTANEOUS at 17:13

## 2019-07-14 RX ADMIN — Medication 10 ML: at 21:36

## 2019-07-14 RX ADMIN — Medication 10 ML: at 06:01

## 2019-07-14 RX ADMIN — SODIUM CHLORIDE 100 ML/HR: 900 INJECTION, SOLUTION INTRAVENOUS at 05:54

## 2019-07-14 RX ADMIN — ARFORMOTEROL TARTRATE 15 MCG: 15 SOLUTION RESPIRATORY (INHALATION) at 19:21

## 2019-07-14 RX ADMIN — MIRTAZAPINE 15 MG: 15 TABLET, FILM COATED ORAL at 21:34

## 2019-07-14 RX ADMIN — PANTOPRAZOLE SODIUM 40 MG: 40 TABLET, DELAYED RELEASE ORAL at 10:14

## 2019-07-14 RX ADMIN — PREDNISONE 10 MG: 5 TABLET ORAL at 10:14

## 2019-07-14 RX ADMIN — BUDESONIDE 500 MCG: 0.5 INHALANT RESPIRATORY (INHALATION) at 07:12

## 2019-07-14 RX ADMIN — HYDROCODONE BITARTRATE AND ACETAMINOPHEN 1 TABLET: 5; 325 TABLET ORAL at 10:20

## 2019-07-14 NOTE — PROGRESS NOTES
Bedside and Verbal shift change report given to Venecia Edwards (oncoming nurse) by Melanie Santo RN (offgoing nurse). Report included the following information SBAR, Kardex, ED Summary, Procedure Summary, Intake/Output, Recent Results, Med Rec Status and Cardiac Rhythm NSR. Bedside and Verbal shift change report given to Jerrod Kamara RN (oncoming nurse) by Venecia Edwards (offgoing nurse). Report included the following information SBAR, Kardex, ED Summary, Procedure Summary, Intake/Output, Recent Results, Med Rec Status and Cardiac Rhythm NSR.

## 2019-07-14 NOTE — PROGRESS NOTES
0030- pt assisted up to [facility] to void by PCT. No reports of pain. AxO. Pt assisted back to bed and placed home CPAP back on. 0230- patient sleeping

## 2019-07-14 NOTE — PROGRESS NOTES
Daily Progress Note: 7/14/2019 Randall Emory Bowman MD 
 
Assessment/Plan:  
Pre-syncope and collapse (7/13/2019) / Hypotension. POA. Not LOC or postural tone loss. 2/2 known postural hypotension and dizziness. --Update echo. --IVFs. 
--PT/OT/Fall precautions. --Hold any anti-hypertensives for now 
  
CAD (coronary artery disease) (10/9/2015). Stable. No CP.  
--Holding BB/ACE-I due to hypotension. --Continue plavix and eliquis 
  
Type 2 diabetes with nephropathy ([facility] [address] (10/1/2018). --Holding saxcagliptin while inpatient. --Start SSI per protocol. --Diabetic diet and teaching.  
  
Dyspnea (11/13/2018) / COPD. At baseline per patient. --Continue ICS/LABA, PRN nebs, and chronic prednisone 
  
ARF (acute renal failure) ([facility] [address] (11/13/2018). Dx in question, she has previously had nml SCr levels but last two checks have been at 1.4 which suggests evolution of CKD. --Monitor with hydration 
  
Obesity (BMI 30-39.9) (11/13/2018). Would benefit from weight loss 
  
Atrial fibrillation ([address] (11/16/2018). In sinus at this time (tachycardic but per patient this is baseline). --Continue eliquis 
  
Gangrene of finger of right hand ([facility] [address] (5/17/2019). S/p amputation. --Continue follow-up with vascular surgery 
  
UTI (urinary tract infection) /Sepsis. Failed outpatient therapy as she had been on macrobid for several days for pan-sensitive Klebsiella and UA remains dirty. --IV CTX. --Follow-up UCX. 
  
Normocytic anemia (7/13/2019). Slightly down from Luciana levels. --Check serologies. --Monitor with wounds and anticoagulation 
  
PAD (peripheral artery disease) / Leg wound, left. --Has plans to undergo procedure to revascularize with Dr. Aura Castro on August 1, 2019. 
-- Cont eliquis and plavix. Has statin allergy 
  
Leg laceration, right, (7/13/2019). Repaired by ER. --Wound care should see if patient remains inpatient on Monday Problem List: 
 Problem List as of 7/14/2019 Date Reviewed: 4/26/2019 Codes Class Noted - Resolved * (Principal) Syncope and collapse ICD-10-CM: R55 
ICD-9-CM: 780.2  7/13/2019 - Present UTI (urinary tract infection) ICD-10-CM: N39.0 ICD-9-CM: 599.0  7/13/2019 - Present COPD (chronic obstructive pulmonary disease) ([facility]) ICD-10-CM: J44.9 ICD-9-CM: 186  7/13/2019 - Present Sepsis ([facility] ICD-10-CM: A41.9 ICD-9-CM: 038.9, 995.91  7/13/2019 - Present Normocytic anemia ICD-10-CM: D64.9 ICD-9-CM: 285.9  7/13/2019 - Present PAD (peripheral artery disease) ([facility]) ICD-10-CM: I73.9 ICD-9-CM: 443.9  7/13/2019 - Present Leg wound, left ICD-10-CM: C01.614K ICD-9-CM: 891.0  7/13/2019 - Present Leg laceration, right, initial encounter ICD-10-CM: I52.170L ICD-9-CM: 894.0  7/13/2019 - Present Cellulitis of right upper arm ICD-10-CM: L03.113 ICD-9-CM: 682.3  5/17/2019 - Present Mild intermittent asthma ICD-10-CM: J45.20 ICD-9-CM: 493.90  5/17/2019 - Present Gangrene of finger of right hand [facility]) ICD-10-CM: H79 
ICD-9-CM: 785.4  5/17/2019 - Present Brachial artery thrombus ([facility]) ICD-10-CM: Z41.1 ICD-9-CM: 444.21  4/26/2019 - Present Bowel obstruction ([facility] ICD-10-CM: G79.141 ICD-9-CM: 560.9  1/8/2019 - Present Partial small bowel obstruction ([facility] ICD-10-CM: K56.600 ICD-9-CM: 560.9  1/5/2019 - Present Sleep apnea ICD-10-CM: G47.30 ICD-9-CM: 780.57  1/5/2019 - Present Overview Signed 1/5/2019  8:41 PM by DO jose raul Jones CPAP Atrial fibrillation [facility]) ICD-10-CM: I48.91 
ICD-9-CM: 427.31  11/16/2018 - Present Dyspnea ICD-10-CM: R06.00 
ICD-9-CM: 786.09  11/13/2018 - Present ARF (acute renal failure) (HCC) ICD-10-CM: N17.9 ICD-9-CM: 584.9  11/13/2018 - Present Obesity (BMI 30-39.9) (Chronic) ICD-10-CM: N96.6 ICD-9-CM: 278.00  11/13/2018 - Present Closed wedge compression fracture of T7 vertebra ([facility] ICD-10-CM: V69.521J ICD-9-CM: 805.2  11/13/2018 - Present Type 2 diabetes with nephropathy ([facility] ICD-10-CM: E11.21 
ICD-9-CM: 250.40, 583.81  10/1/2018 - Present Chest pain ICD-10-CM: R07.9 ICD-9-CM: 786.50  6/27/2018 - Present Generalized abdominal pain ICD-10-CM: R10.84 ICD-9-CM: 789.07  6/27/2018 - Present Recurrent deep vein thrombosis (DVT) (HCC) ICD-10-CM: I82.409 ICD-9-CM: 453.40  3/30/2018 - Present Chronic anticoagulation (Chronic) ICD-10-CM: Z79.01 
ICD-9-CM: V58.61  3/30/2018 - Present Coronary artery disease involving native coronary artery without angina pectoris (Chronic) ICD-10-CM: I25.10 ICD-9-CM: 414.01  1/22/2016 - Present Essential hypertension (Chronic) ICD-10-CM: I10 
ICD-9-CM: 401.9  1/22/2016 - Present CAD (coronary artery disease) ICD-10-CM: I25.10 ICD-9-CM: 414.00  10/9/2015 - Present Type II diabetes mellitus (HCC) (Chronic) ICD-10-CM: E11.9 ICD-9-CM: 250.00  10/9/2015 - Present NSTEMI (non-ST elevated myocardial infarction) ([facility] ICD-10-CM: I21.4 ICD-9-CM: 410.70  10/9/2015 - Present RESOLVED: HTN (hypertension) ICD-10-CM: I10 
ICD-9-CM: 401.9  10/9/2015 - 1/22/2016 HPI:  
 Ms. Kaylee Claros is a 72 y.o.  female with a hx of CAD, COPD, Afib, DM2, DVT, PAD, chronic wounds, obesity who presented to the Emergency Department complaining of GLF. Patient got up from seated position and ambulated across room, she allowed for some equilibration and was walking with her rollator. She became very dizzy and fell, striking the wall with her head and lacerating her right lower leg. No LOC, no post-fall sx, able to get up with some assistance.  EMS arrived to find her hypotensive and transported to ER where her BP improved with volume resuscitation but due to ongoing UTI failing outpt treatment, she will be admitted for further management. (Dr Pio Nye) : No further dizziness. Has been up to BR without difficulty. Having some pain in her leg from the fall and laceration. WBC better. Hb down at 7.7. UC pending. ECHO pending. BP improving. Review of Systems: A comprehensive review of systems was negative except for that written in the HPI. Objective:  
Physical Exam:  
 
Visit Vitals /79 (BP 1 Location: Right arm, BP Patient Position: At rest) Pulse 86 Temp 97.8 °F (36.6 °C) Resp 17 Ht 5' 6\" (1.676 m) Wt 220 lb (99.8 kg) SpO2 98% BMI 35.51 kg/m² O2 Device: Room air Temp (24hrs), Av °F (36.7 °C), Min:97.8 °F (36.6 °C), Max:98.3 °F (36.8 °C) No intake/output data recorded.  1901 -  0700 In: 10 [I.V.:10] Out: 850 [Urine:850] General:  Alert, cooperative, no distress, appears stated age. Head:  Normocephalic, without obvious abnormality, atraumatic. Eyes:  Conjunctivae/corneas clear. PERRL, EOMs intact. Nose: Nares normal. Septum midline. Mucosa normal. No drainage or sinus tenderness. Throat: Lips, mucosa, and tongue normal. Teeth and gums normal.  
Neck: Supple, symmetrical, trachea midline, no adenopathy, thyroid: no enlargement/tenderness/nodules, no carotid bruit and no JVD. Back:   Symmetric, no curvature. ROM normal. No CVA tenderness. Lungs:   Clear to auscultation bilaterally. No wheezing. Chest wall:  No tenderness or deformity. Heart:  Regular rate and rhythm, S1, S2 normal, no murmur, click, rub or gallop. Abdomen:   Soft, non-tender. Bowel sounds normal. No masses,  No organomegaly. Extremities: Extremities with bruising upper extremities, RLE with dressing intact, no cyanosis or edema. No calf tenderness or cords. Pulses: 2+ and symmetric all extremities. Skin: Skin color, texture, turgor normal. No rashes or lesions Neurologic: CNII-XII intact. Alert and oriented X 3.   Fine motor of hands and fingers normal.   equal.  No cogwheeling or rigidity. Gait not tested at this time. Sensation grossly normal to touch. Gross motor of extremities normal.    
 
Data Review:  
   
Recent Days: 
Recent Labs  
  07/14/19 
0023 07/13/19 
1317 WBC 11.7* 17.1* HGB 7.7* 8.8* HCT 26.8* 30.9*  428* Recent Labs  
  07/14/19 
0023 07/13/19 
1317  136  
K 4.8 4.2 * 106 CO2 22 21 * 96 BUN 21* 19  
CREA 1.55* 1.43* CA 7.6* 8.3*  
MG 2.1 2.1 PHOS 3.4  --   
ALB  --  3.0* TBILI  --  0.4 SGOT  --  33 ALT  --  38 No results for input(s): PH, PCO2, PO2, HCO3, FIO2 in the last 72 hours. 24 Hour Results: 
Recent Results (from the past 24 hour(s)) SAMPLES BEING HELD Collection Time: 07/13/19  1:17 PM  
Result Value Ref Range SAMPLES BEING HELD SST,RED,BLUE   
 COMMENT Add-on orders for these samples will be processed based on acceptable specimen integrity and analyte stability, which may vary by analyte. CBC WITH AUTOMATED DIFF Collection Time: 07/13/19  1:17 PM  
Result Value Ref Range WBC 17.1 (H) 3.6 - 11.0 K/uL  
 RBC 3.69 (L) 3.80 - 5.20 M/uL HGB 8.8 (L) 11.5 - 16.0 g/dL HCT 30.9 (L) 35.0 - 47.0 % MCV 83.7 80.0 - 99.0 FL  
 MCH 23.8 (L) 26.0 - 34.0 PG  
 MCHC 28.5 (L) 30.0 - 36.5 g/dL  
 RDW 18.3 (H) 11.5 - 14.5 % PLATELET 061 (H) 905 - 400 K/uL MPV 9.4 8.9 - 12.9 FL  
 NRBC 0.0 0  WBC ABSOLUTE NRBC 0.00 0.00 - 0.01 K/uL NEUTROPHILS 84 (H) 32 - 75 % LYMPHOCYTES 9 (L) 12 - 49 % MONOCYTES 6 5 - 13 % EOSINOPHILS 0 0 - 7 % BASOPHILS 0 0 - 1 % IMMATURE GRANULOCYTES 1 (H) 0.0 - 0.5 % ABS. NEUTROPHILS 14.4 (H) 1.8 - 8.0 K/UL  
 ABS. LYMPHOCYTES 1.5 0.8 - 3.5 K/UL  
 ABS. MONOCYTES 1.0 0.0 - 1.0 K/UL  
 ABS. EOSINOPHILS 0.0 0.0 - 0.4 K/UL  
 ABS. BASOPHILS 0.0 0.0 - 0.1 K/UL  
 ABS. IMM. GRANS. 0.2 (H) 0.00 - 0.04 K/UL  
 RBC COMMENTS ANISOCYTOSIS 1+ 
    
 RBC COMMENTS OVALOCYTES PRESENT 
    
 RBC COMMENTS SCHISTOCYTES PRESENT 
    
 WBC COMMENTS TOXIC GRANULATION    
 DF SMEAR SCANNED    
METABOLIC PANEL, COMPREHENSIVE Collection Time: 07/13/19  1:17 PM  
Result Value Ref Range Sodium 136 136 - 145 mmol/L Potassium 4.2 3.5 - 5.1 mmol/L Chloride 106 97 - 108 mmol/L  
 CO2 21 21 - 32 mmol/L Anion gap 9 5 - 15 mmol/L Glucose 96 65 - 100 mg/dL BUN 19 6 - 20 MG/DL Creatinine 1.43 (H) 0.55 - 1.02 MG/DL  
 BUN/Creatinine ratio 13 12 - 20 GFR est AA 44 (L) >60 ml/min/1.73m2 GFR est non-AA 36 (L) >60 ml/min/1.73m2 Calcium 8.3 (L) 8.5 - 10.1 MG/DL Bilirubin, total 0.4 0.2 - 1.0 MG/DL  
 ALT (SGPT) 38 12 - 78 U/L  
 AST (SGOT) 33 15 - 37 U/L Alk. phosphatase 127 (H) 45 - 117 U/L Protein, total 6.9 6.4 - 8.2 g/dL Albumin 3.0 (L) 3.5 - 5.0 g/dL Globulin 3.9 2.0 - 4.0 g/dL A-G Ratio 0.8 (L) 1.1 - 2.2    
TROPONIN I Collection Time: 07/13/19  1:17 PM  
Result Value Ref Range Troponin-I, Qt. <0.05 <0.05 ng/mL MAGNESIUM Collection Time: 07/13/19  1:17 PM  
Result Value Ref Range Magnesium 2.1 1.6 - 2.4 mg/dL EKG, 12 LEAD, INITIAL Collection Time: 07/13/19  1:47 PM  
Result Value Ref Range Ventricular Rate 111 BPM  
 Atrial Rate 111 BPM  
 P-R Interval 132 ms QRS Duration 76 ms  
 Q-T Interval 336 ms  
 QTC Calculation (Bezet) 456 ms Calculated P Axis 68 degrees Calculated R Axis -60 degrees Calculated T Axis 31 degrees Diagnosis Sinus tachycardia Left anterior fascicular block Abnormal ECG When compared with ECG of 28-JUN-2019 16:08, No significant change was found URINALYSIS W/MICROSCOPIC Collection Time: 07/13/19  2:05 PM  
Result Value Ref Range Color YELLOW/STRAW Appearance CLOUDY (A) CLEAR Specific gravity 1.006 1.003 - 1.030    
 pH (UA) 5.5 5.0 - 8.0 Protein NEGATIVE  NEG mg/dL Glucose NEGATIVE  NEG mg/dL Ketone NEGATIVE  NEG mg/dL  Bilirubin NEGATIVE  NEG    
 Blood TRACE (A) NEG Urobilinogen 0.2 0.2 - 1.0 EU/dL Nitrites POSITIVE (A) NEG Leukocyte Esterase LARGE (A) NEG    
 WBC  0 - 4 /hpf  
 RBC 0-5 0 - 5 /hpf Epithelial cells FEW FEW /lpf Bacteria 4+ (A) NEG /hpf Hyaline cast 2-5 0 - 5 /lpf URINE CULTURE HOLD SAMPLE Collection Time: 07/13/19  2:05 PM  
Result Value Ref Range Urine culture hold URINE ON HOLD IN MICROBIOLOGY DEPT FOR 3 DAYS. IF UNPRESERVED URINE IS SUBMITTED, IT CANNOT BE USED FOR ADDITIONAL TESTING AFTER 24 HRS, RECOLLECTION WILL BE REQUIRED. IRON PROFILE Collection Time: 07/13/19  4:37 PM  
Result Value Ref Range Iron 25 (L) 35 - 150 ug/dL TIBC 321 250 - 450 ug/dL Iron % saturation 8 (L) 20 - 50 % FERRITIN Collection Time: 07/13/19  4:37 PM  
Result Value Ref Range Ferritin 32 8 - 252 NG/ML  
GLUCOSE, POC Collection Time: 07/13/19  8:54 PM  
Result Value Ref Range Glucose (POC) 101 (H) 65 - 100 mg/dL Performed by [facility] (PCT) METABOLIC PANEL, BASIC Collection Time: 07/14/19 12:23 AM  
Result Value Ref Range Sodium 139 136 - 145 mmol/L Potassium 4.8 3.5 - 5.1 mmol/L Chloride 110 (H) 97 - 108 mmol/L  
 CO2 22 21 - 32 mmol/L Anion gap 7 5 - 15 mmol/L Glucose 111 (H) 65 - 100 mg/dL BUN 21 (H) 6 - 20 MG/DL Creatinine 1.55 (H) 0.55 - 1.02 MG/DL  
 BUN/Creatinine ratio 14 12 - 20 GFR est AA 40 (L) >60 ml/min/1.73m2 GFR est non-AA 33 (L) >60 ml/min/1.73m2 Calcium 7.6 (L) 8.5 - 10.1 MG/DL  
CBC WITH AUTOMATED DIFF Collection Time: 07/14/19 12:23 AM  
Result Value Ref Range WBC 11.7 (H) 3.6 - 11.0 K/uL  
 RBC 3.24 (L) 3.80 - 5.20 M/uL HGB 7.7 (L) 11.5 - 16.0 g/dL HCT 26.8 (L) 35.0 - 47.0 % MCV 82.7 80.0 - 99.0 FL  
 MCH 23.8 (L) 26.0 - 34.0 PG  
 MCHC 28.7 (L) 30.0 - 36.5 g/dL  
 RDW 18.3 (H) 11.5 - 14.5 % PLATELET 732 227 - 070 K/uL MPV 9.6 8.9 - 12.9 FL  
 NRBC 0.0 0  WBC ABSOLUTE NRBC 0.00 0.00 - 0.01 K/uL NEUTROPHILS 84 (H) 32 - 75 % LYMPHOCYTES 9 (L) 12 - 49 % MONOCYTES 6 5 - 13 % EOSINOPHILS 0 0 - 7 % BASOPHILS 0 0 - 1 % IMMATURE GRANULOCYTES 1 (H) 0.0 - 0.5 % ABS. NEUTROPHILS 9.8 (H) 1.8 - 8.0 K/UL  
 ABS. LYMPHOCYTES 1.1 0.8 - 3.5 K/UL  
 ABS. MONOCYTES 0.7 0.0 - 1.0 K/UL  
 ABS. EOSINOPHILS 0.0 0.0 - 0.4 K/UL  
 ABS. BASOPHILS 0.0 0.0 - 0.1 K/UL  
 ABS. IMM. GRANS. 0.1 (H) 0.00 - 0.04 K/UL  
 DF SMEAR SCANNED    
 RBC COMMENTS ANISOCYTOSIS 1+ 
    
 RBC COMMENTS HYPOCHROMIA 1+ MAGNESIUM Collection Time: 07/14/19 12:23 AM  
Result Value Ref Range Magnesium 2.1 1.6 - 2.4 mg/dL PHOSPHORUS Collection Time: 07/14/19 12:23 AM  
Result Value Ref Range Phosphorus 3.4 2.6 - 4.7 MG/DL  
GLUCOSE, POC Collection Time: 07/14/19  6:20 AM  
Result Value Ref Range Glucose (POC) 105 (H) 65 - 100 mg/dL Performed by [facility] (PCT) Medications reviewed Current Facility-Administered Medications Medication Dose Route Frequency  sodium chloride (NS) flush 5-40 mL  5-40 mL IntraVENous Q8H  
 sodium chloride (NS) flush 5-40 mL  5-40 mL IntraVENous PRN  
 cefTRIAXone (ROCEPHIN) 1 g in 0.9% sodium chloride (MBP/ADV) 50 mL  1 g IntraVENous Q24H  
 acetaminophen (TYLENOL) tablet 650 mg  650 mg Oral Q4H PRN  
 HYDROcodone-acetaminophen (NORCO) 5-325 mg per tablet 1 Tab  1 Tab Oral Q4H PRN  
 naloxone (NARCAN) injection 0.4 mg  0.4 mg IntraVENous PRN  
 ondansetron (ZOFRAN) injection 4 mg  4 mg IntraVENous Q4H PRN  
 bisacodyl (DULCOLAX) tablet 5 mg  5 mg Oral DAILY PRN  
 budesonide (PULMICORT) 500 mcg/2 ml nebulizer suspension  500 mcg Nebulization BID RT  
 arformoterol (BROVANA) neb solution 15 mcg  15 mcg Nebulization BID RT  
 albuterol-ipratropium (DUO-NEB) 2.5 MG-0.5 MG/3 ML  3 mL Nebulization Q4H PRN  
 glucose chewable tablet 16 g  4 Tab Oral PRN  
 glucagon (GLUCAGEN) injection 1 mg  1 mg IntraMUSCular PRN  
  dextrose 10% infusion 125-250 mL  125-250 mL IntraVENous PRN  
 insulin lispro (HUMALOG) injection   SubCUTAneous AC&HS  
 apixaban (ELIQUIS) tablet 5 mg  5 mg Oral BID  cholecalciferol (VITAMIN D3) tablet 2,000 Units  2,000 Units Oral DAILY  clopidogrel (PLAVIX) tablet 75 mg  75 mg Oral DAILY  DULoxetine (CYMBALTA) capsule 60 mg  60 mg Oral DAILY  mirtazapine (REMERON) tablet 15 mg  15 mg Oral QHS  montelukast (SINGULAIR) tablet 10 mg  10 mg Oral DAILY  pantoprazole (PROTONIX) tablet 40 mg  40 mg Oral ACB&D  predniSONE (DELTASONE) tablet 10 mg  10 mg Oral BID WITH MEALS Care Plan discussed with: Patient/Family Total time spent with patient and review of records: 30 minutes.  
 
Tani Wright MD

## 2019-07-14 NOTE — PROGRESS NOTES
Problem: Mobility Impaired (Adult and Pediatric) Goal: *Acute Goals and Plan of Care (Insert Text) Description Physical Therapy Goals Initiated 7/14/2019 1. Patient will move from supine to sit and sit to supine , scoot up and down and roll side to side in bed with independence within 7 day(s). 2.  Patient will transfer from bed to chair and chair to bed with independence using the least restrictive device within 7 day(s). 3.  Patient will perform sit to stand with modified independence within 7 day(s). 4.  Patient will ambulate with modified independence for 200 feet with the least restrictive device within 7 day(s). Outcome: Progressing Towards Goal 
 PHYSICAL THERAPY EVALUATION Patient: Nathan Gillespie (25 y.o. female) Date: 7/14/2019 Primary Diagnosis: Syncope and collapse [R55] UTI (urinary tract infection) [N39.0] Precautions: orthostatic ASSESSMENT : 
Based on the objective data described below, the patient presents with generalized weakness and feeling lightheadedness with change of position. Patient lives alone in [address] [place] [address] no steps to enter recent discharge from this hospital few months ago using a rollator walker at baseline. Rolled on the edge of bed SBA, Supine to sit SBA, sit to stand SBA, ambulate towards the recliner SBA no loss of balance steady sitting and standing balance vital remains stable during therapy however need a few minutes to sit down before standing stated she feels little bit light headed /83. OOB to chair as tolerated performed some active range of motion exercise on both LE all planes, activated chair alarm and nurse in the room during therapy monitoring vitals. Vitals:  
 07/14/19 8415 07/14/19 8152 07/14/19 0945 07/14/19 5312 BP:  107/79 126/60 152/83 BP 1 Location:   Left arm Left arm BP Patient Position:   At rest Sitting Pulse:   100 100 Resp:   16 Temp:      
SpO2: 98%  99% Weight:  99.8 kg (220 lb) Height:  5' 6\" (1.676 m) Patient will benefit from skilled intervention to address the above impairments. Patient?s rehabilitation potential is considered to be Excellent Factors which may influence rehabilitation potential include:  
? None noted ? Mental ability/status ? Medical condition ? Home/family situation and support systems ? Safety awareness 
? Pain tolerance/management 
? Other: PLAN : 
Recommendations and Planned Interventions: 
?           Bed Mobility Training             ? Neuromuscular Re-Education ? Transfer Training                   ? Orthotic/Prosthetic Training 
? Gait Training                         ? Modalities ? Therapeutic Exercises           ? Edema Management/Control ? Therapeutic Activities            ? Patient and Family Training/Education ? Other (comment): Frequency/Duration: Patient will be followed by physical therapy  5 times a week to address goals. Discharge Recommendations: Home Health vs. none and To Be Determined pending progress from therapy. Further Equipment Recommendations for Discharge: already has DME's SUBJECTIVE:  
Patient stated ? I feel ok but need to sit longer before standing. ? OBJECTIVE DATA SUMMARY:  
HISTORY:   
Past Medical History:  
Diagnosis Date Ast[facility] 11/16/2018 Autoimmune disease [facility]   
 fibromyalgia CAD (coronary artery disease) 10/9/2015 Closed wedge compression fracture of T7 vertebra ([facility] 11/13/2018 Diabetes ([facility] DVT (deep vein thrombosis) in pregnancy ([facility] GERD (gastroesophageal reflux disease) HTN (hypertension) NSTEMI (non-ST elevated myocardial infarction) ([facility] 10/9/2015 Obesity (BMI 30-39.9) 11/13/2018 Sleep apnea   
 wears CPAP Past Surgical History:  
Procedure Laterality Date  ABDOMEN SURGERY PROC UNLISTED    
 hital hernia repair, gall bladder removed BREAST SURGERY PROCEDURE UNLISTED    
 lumpectomy CARDIAC SURG PROCEDURE UNLIST  10/9/15  
 2 stents [facility] 64 HX COLOSTOMY    
 and reversal, post episiotomy repair [address] HX UROLOGICAL  2009  
 bladder sling Prior Level of Function/Home Situation: see above notes for details. Personal factors and/or comorbidities impacting plan of care: EXAMINATION/PRESENTATION/DECISION MAKING:  
Critical Behavior: 
Neurologic State: Alert Orientation Level: Oriented X4 Cognition: Appropriate decision making Hearing: Auditory Auditory Impairment: None Range Of Motion: 
AROM: Within functional limits PROM: Within functional limits Strength:   
Strength: Within functional limits Tone & Sensation:  
  
  
  
  
  
  
  
  
  
   
Coordination: 
Coordination: Within functional limits Vision:  
  
Functional Mobility: 
Bed Mobility: 
Rolling: Stand-by assistance Supine to Sit: Stand-by assistance Sit to Supine: Stand-by assistance Scooting: Stand-by assistance Transfers: 
Sit to Stand: Stand-by assistance Stand to Sit: Stand-by assistance Stand Pivot Transfers: Stand-by assistance Bed to Chair: Stand-by assistance Balance:  
Sitting: Intact Standing: Intact; With support Ambulation/Gait Training: 
Distance (ft): 10 Feet (ft) Assistive Device: Walker, rolling;Gait belt Ambulation - Level of Assistance: Stand-by assistance Gait Description (WDL): Exceptions to [facility] Gait Abnormalities: Path deviations Base of Support: Widened Therapeutic Exercises:  
 Instructed patient to continue active range of motion exercise on both legs while up on chair or on bed. Functional Measure: 
Barthel Index: 
Bathin Bladder: 10 Bowels: 10 
Groomin Dressing: 10 Feeding: 10 Mobility: 15 
Stairs: 0 Toilet Use: 5 Transfer (Bed to Chair and Back): 10 Total: 80/100 Percentage of impairment  
0% 1-19% 20-39% 40-59% 60-79% 80-99% 100% Barthel Score 0-100 100 99-80 79-60 59-40 20-39 1-19 
 0 The Barthel ADL Index: Guidelines 1. The index should be used as a record of what a patient does, not as a record of what a patient could do. 2. The main aim is to establish degree of independence from any help, physical or verbal, however minor and for whatever reason. 3. The need for supervision renders the patient not independent. 4. A patient's performance should be established using the best available evidence. Asking the patient, friends/relatives and nurses are the usual sources, but direct observation and common sense are also important. However direct testing is not needed. 5. Usually the patient's performance over the preceding 24-48 hours is important, but occasionally longer periods will be relevant. 6. Middle categories imply that the patient supplies over 50 per cent of the effort. 7. Use of aids to be independent is allowed. Anna Rehman., Barthel, D.W. ([id number]). Functional evaluation: the Barthel Index. [facility] (14)2. DAMI Perez, Salima Silva., Simin Paulson., Korin, [address] [place] [address] (1999). Measuring the change indisability after inpatient rehabilitation; comparison of the responsiveness of the Barthel Index and Functional [place] Measure. Journal of Neurology, Neurosurgery, and Psychiatry, 66(4), 015-189. Juan Milner, [place].ELIZ, RENE Parry.MEGHA, & Alena Cook MJosiahA. (2004.) Assessment of post-stroke quality of life in cost-effectiveness studies: The usefulness of the Barthel Index and the EuroQoL-5D. [facility], 13, 064-94 Physical Therapy Evaluation Charge Determination History Examination Presentation Decision-Making LOW Complexity : Zero comorbidities / personal factors that will impact the outcome / POC LOW Complexity : 1-2 Standardized tests and measures addressing body structure, function, activity limitation and / or participation in recreation  LOW Complexity : Stable, uncomplicated  Other outcome measures barthel  LOW Based on the above components, the patient evaluation is determined to be of the following complexity level: LOW Pain: 
  
  
  
  
  
  
Activity Tolerance:  
Good. Please refer to the flowsheet for vital signs taken during this treatment. After treatment:  
?         Patient left in no apparent distress sitting up in chair ? Patient left in no apparent distress in bed 
? Call bell left within reach ? Nursing notified ? Caregiver present ? Bed/chair alarm activated COMMUNICATION/EDUCATION:  
The patient?s plan of care was discussed with: Registered Nurse and patient . ? Fall prevention education was provided and the patient/caregiver indicated understanding. ? Patient/family have participated as able in goal setting and plan of care. ?         Patient/family agree to work toward stated goals and plan of care. ?         Patient understands intent and goals of therapy, but is neutral about his/her participation. ? Patient is unable to participate in goal setting and plan of care. Thank you for this referral. 
Mike Torres, PT,WCC. Time Calculation: 27 mins

## 2019-07-14 NOTE — PROGRESS NOTES
Problem: Self Care Deficits Care Plan (Adult) Goal: *Acute Goals and Plan of Care (Insert Text) Description Occupational Therapy Goals Initiated 7/14/2019 1. Patient will perform lower body dressing and bathing with modified independence within 7 day(s). 2.  Patient will perform toilet transfers with modified independence within 7 day(s). 3.  Patient will perform all aspects of toileting with modified independence within 7 day(s). 4.  Patient will participate in upper extremity therapeutic exercise/activities with independence for 10 minutes within 7 day(s). 5.  Patient will utilize energy conservation techniques during functional activities with verbal cues within 7 day(s). Outcome: Progressing Towards Goal 
 OCCUPATIONAL THERAPY EVALUATION Patient: Speedy Haney (56 y.o. female) Date: 7/14/2019 Primary Diagnosis: Syncope and collapse [R55] UTI (urinary tract infection) [N39.0] Precautions: fall ASSESSMENT : 
Based on the objective data described below, the patient presents with decreased activity tolerance following admission on 7/13 after syncopal episode resulted in a fall. Patient lives at [facility], uses a rollator for all mobility/transfers, and is independent with all ADLs. Today, patient was received in bed alert, oriented x4, and following all commands. She required supervision for bed mobility and OOB transfers using rolling walker. She was able to transfer into the bathroom for toileting and grooming needs. Patient is currently independent with UB ADLs and requires CGA for LB ADLs. Patient with SOB following activity; HR elevated to 128 bpm and SpO2 ranging from 95-98% on room air. Patient educated on energy conservation, pacing, and pursed lip breathing techniques. Issued cushion to assist with comfort d/t bruising on coccyx d/t fall.   Patient would benefit from continued skilled OT to progress towards goals and improve overall independence. Patient will benefit from skilled intervention to address the above impairments. Patient?s rehabilitation potential is considered to be Good Factors which may influence rehabilitation potential include: ? None noted ? Mental ability/status ? Medical condition ? Home/family situation and support systems ? Safety awareness ? Pain tolerance/management ? Other: PLAN : 
Recommendations and Planned Interventions: 
?               Self Care Training                  ? Therapeutic Activities ? Functional Mobility Training    ? Cognitive Retraining 
? Therapeutic Exercises           ? Endurance Activities ? Balance Training                   ? Neuromuscular Re-Education ? Visual/Perceptual Training     ? Home Safety Training 
? Patient Education                 ? Family Training/Education ? Other (comment): Frequency/Duration: Patient will be followed by occupational therapy 3 times a week to address goals. Discharge Recommendations: Home Health Further Equipment Recommendations for Discharge: none at this time SUBJECTIVE:  
Patient stated ? This feels so much better. ? re: cushion in the chair OBJECTIVE DATA SUMMARY:  
HISTORY:  
Past Medical History:  
Diagnosis Date Asthma Atrial fibrillation ([facility] 11/16/2018 [facility]   
 fibromyalgia CAD (coronary artery disease) 10/9/2015 Closed wedge compression fracture of T7 vertebra ([facility] 11/13/2018 [facility] DVT (deep vein thrombosis) in pregnancy ([facility] GERD (gastroesophageal reflux disease) HTN (hypertension) NSTEMI (non-ST elevated myocardial infarction) ([facility] 10/9/2015 Obesity (BMI 30-39.9) 11/13/2018 Sleep apnea   
 wears CPAP Past Surgical History:  
Procedure Laterality Date ABDOMEN SURGERY PROC UNLISTED    
 hital hernia repair, gall bladder removed BREAST SURGERY PROCEDURE UNLISTED    
 lumpectomy CARDIAC SURG PROCEDURE UNLIST  10/9/15  
 2 stents [facility] HX COLOSTOMY    
 and reversal, post episiotomy repair [address] HX UROLOGICAL  2009  
 bladder sling Prior Level of Function/Environment/Context: Patient lives alone at [facility]. Home Situation Home Environment: [facility] living([facility]) One/Two Story Residence: One story Living Alone: Yes Support Systems: Child(helio), Family member(s) Patient Expects to be Discharged to[de-identified] Private residence Current DME Used/Available at Home: Walker, rolling, Cane, straight Tub or Shower Type: Shower Hand dominance: Right EXAMINATION OF PERFORMANCE DEFICITS: 
Cognitive/Behavioral Status: 
Neurologic State: Alert Orientation Level: Oriented X4 Cognition: Appropriate decision making; Appropriate for age attention/concentration; Appropriate safety awareness; Follows commands Perception: Appears intact Perseveration: No perseveration noted Safety/Judgement: Awareness of environment;Good awareness of safety precautions Skin: Intact int he uppers Edema: None noted in the uppers Hearing: Auditory Auditory Impairment: None Vision/Perceptual:   
Tracking: Able to track stimulus in all quadrants w/o difficulty Diplopia: No   
Acuity: Within Defined Limits Range of Motion: 
AROM: Within functional limits in the uppers PROM: Within functional limits in the uppers Strength: 
Strength: Within functional limits in the uppers Coordination: 
Fine Motor Skills-Upper: Left Intact; Right Impaired(recent amuputations of the 2nd, 3rd, and 4th digits) Gross Motor Skills-Upper: Left Intact; Right Intact Tone & Sensation: 
Tone: normal 
 Sensation: intact Balance: 
Sitting: Intact Standing: Intact Functional Mobility and Transfers for ADLs: 
Bed Mobility: 
Rolling: Supervision Supine to Sit: Supervision Sit to Supine: (pt remained up at end of tx) Scooting: Supervision Transfers: 
Sit to Stand: Supervision Stand to Sit: Supervision Bed to Chair: Supervision Bathroom Mobility: Supervision/set up Toilet Transfer : Supervision ADL Assessment: 
Feeding: Independent Oral Facial Hygiene/Grooming: Independent Bathing: Contact guard assistance Upper Body Dressing: Independent Lower Body Dressing: Contact guard assistance Toileting: Contact guard assistance Cognitive Retraining Safety/Judgement: Awareness of environment;Good awareness of safety precautions Functional Measure: 
Barthel Index: 
Bathin Bladder: 10 Bowels: 10 
Groomin Dressin Feeding: 10 Mobility: 10 Stairs: 0 Toilet Use: 5 Transfer (Bed to Chair and Back): 10 Total: 65/100 Percentage of impairment  
0% 1-19% 20-39% 40-59% 60-79% 80-99% 100% Barthel Score 0-100 100 99-80 79-60 59-40 20-39 1-19 
 0 The Barthel ADL Index: Guidelines 1. The index should be used as a record of what a patient does, not as a record of what a patient could do. 2. The main aim is to establish degree of independence from any help, physical or verbal, however minor and for whatever reason. 3. The need for supervision renders the patient not independent. 4. A patient's performance should be established using the best available evidence. Asking the patient, friends/relatives and nurses are the usual sources, but direct observation and common sense are also important. However direct testing is not needed. 5. Usually the patient's performance over the preceding 24-48 hours is important, but occasionally longer periods will be relevant. 6. Middle categories imply that the patient supplies over 50 per cent of the effort. 7. Use of aids to be independent is allowed. Coleta Foot., Barthel, D.W. ([id number]). Functional evaluation: the Barthel Index. [facility] (14)2. DAMI Dowell, Fany Almonte.Jae., Korin, [address] (1999). Measuring the change indisability after inpatient rehabilitation; comparison of the responsiveness of the Barthel Index and Functional [place] Measure. Journal of Neurology, Neurosurgery, and Psychiatry, 66(4), 532-851. PARRISH Ford, SANIYA Parry, & Esperanza Trimble MALEYDA. (2004.) Assessment of post-stroke quality of life in cost-effectiveness studies: The usefulness of the Barthel Index and the EuroQoL-5D. [facility], 13, 771-58 Occupational Therapy Evaluation Charge Determination History Examination Decision-Making LOW Complexity : Brief history review  LOW Complexity : 1-3 performance deficits relating to physical, cognitive , or psychosocial skils that result in activity limitations and / or participation restrictions  LOW Complexity : No comorbidities that affect functional and no verbal or physical assistance needed to complete eval tasks Based on the above components, the patient evaluation is determined to be of the following complexity level: LOW Activity Tolerance:  
Patient tolerated eval well. Please refer to the flowsheet for vital signs taken during this treatment. After treatment:  
? Patient left in no apparent distress sitting up in chair ? Patient left in no apparent distress in bed 
? Call bell left within reach ? Nursing notified ? Caregiver present ? Chair alarm activated COMMUNICATION/EDUCATION:  
The patient?s plan of care was discussed with: Physical Therapist, Registered Nurse and patient . ? Home safety education was provided and the patient/caregiver indicated understanding. ? Patient/family have participated as able in goal setting and plan of care. ? Patient/family agree to work toward stated goals and plan of care. ? Patient understands intent and goals of therapy, but is neutral about his/her participation. ? Patient is unable to participate in goal setting and plan of care. This patient?s plan of care is appropriate for delegation to [facility]. Thank you for this referral. 
Cristela Sweet, OTR/L Time Calculation: 41 mins

## 2019-07-14 NOTE — PROGRESS NOTES
1900 
Bedside and Verbal shift change report given to [facility] (oncoming nurse) by Redell Kawasaki (offgoing nurse). Report included the following information SBAR, Kardex, Procedure Summary, Intake/Output, MAR, Accordion, Recent Results and Cardiac Rhythm NSR- sinus tachy. Patient on bed, resting. Initial assessment done. No complaints of pain. Patient aware to call before getting out of bed. Bed alarm on. Visit Vitals BP 91/52 (BP 1 Location: Left arm, BP Patient Position: At rest) Pulse (!) 110 Temp 98.3 °F (36.8 °C) Resp 17 Ht 5' 6\" (1.676 m) Wt 99.8 kg (220 lb) SpO2 95% Comment: Simultaneous filing. User may not have seen previous data. BMI 35.51 kg/m² [facility] Bedside and Written shift change report given to [facility] (oncoming nurse) by Serenity Santiago RN (offgoing nurse). Report included the following information SBAR, Kardex, Procedure Summary, Intake/Output, MAR, Accordion and Recent Results.

## 2019-07-14 NOTE — PROGRESS NOTES
Bedside and Verbal shift change report given to [address] [place] [address] (oncoming nurse) by TERESITA HERNADEZ [facility] (offgoing nurse). Report included the following information SBAR, Kardex, Intake/Output, MAR, Recent Results and Cardiac Rhythm SR.  
 
4570 Patient is currently having echo performed at the bedside. 1000 Patient assisted out of bed to recliner chair by physical therapy. 1025 patient assisted from chair back to bed. patient c/o of pain of buttock, rated 6 out of 10. Bruising present to site. Patient medicated with hydrocodone po. 
 
1300 pt assisted out of bed to recliner chair by occupational therapy. [phone] patient requested help to get back into bed from chair. 1500 patient requested pain medicine for c/o back pain, lower posterior neck pain and right hand pain near suture sites of fingers. Patient requested and received hydrocodone po for pain.

## 2019-07-15 VITALS
TEMPERATURE: 98.4 F | SYSTOLIC BLOOD PRESSURE: 103 MMHG | HEART RATE: 77 BPM | BODY MASS INDEX: 35.36 KG/M2 | WEIGHT: 220 LBS | DIASTOLIC BLOOD PRESSURE: 55 MMHG | OXYGEN SATURATION: 97 % | HEIGHT: 66 IN | RESPIRATION RATE: 16 BRPM

## 2019-07-15 LAB
ALBUMIN SERPL-MCNC: 2.4 G/DL (ref 3.5–5)
ALBUMIN/GLOB SERPL: 0.7 {RATIO} (ref 1.1–2.2)
ALP SERPL-CCNC: 98 U/L (ref 45–117)
ALT SERPL-CCNC: 27 U/L (ref 12–78)
ANION GAP SERPL CALC-SCNC: 8 MMOL/L (ref 5–15)
AST SERPL-CCNC: 16 U/L (ref 15–37)
BACTERIA SPEC CULT: ABNORMAL
BASOPHILS # BLD: 0 K/UL (ref 0–0.1)
BASOPHILS NFR BLD: 0 % (ref 0–1)
BILIRUB SERPL-MCNC: 0.2 MG/DL (ref 0.2–1)
BUN SERPL-MCNC: 17 MG/DL (ref 6–20)
BUN/CREAT SERPL: 14 (ref 12–20)
CALCIUM SERPL-MCNC: 7.7 MG/DL (ref 8.5–10.1)
CC UR VC: ABNORMAL
CHLORIDE SERPL-SCNC: 111 MMOL/L (ref 97–108)
CO2 SERPL-SCNC: 22 MMOL/L (ref 21–32)
CREAT SERPL-MCNC: 1.23 MG/DL (ref 0.55–1.02)
DIFFERENTIAL METHOD BLD: ABNORMAL
EOSINOPHIL # BLD: 0 K/UL (ref 0–0.4)
EOSINOPHIL NFR BLD: 0 % (ref 0–7)
ERYTHROCYTE [DISTWIDTH] IN BLOOD BY AUTOMATED COUNT: 18 % (ref 11.5–14.5)
GLOBULIN SER CALC-MCNC: 3.5 G/DL (ref 2–4)
GLUCOSE BLD STRIP.AUTO-MCNC: 127 MG/DL (ref 65–100)
GLUCOSE BLD STRIP.AUTO-MCNC: 144 MG/DL (ref 65–100)
GLUCOSE BLD STRIP.AUTO-MCNC: 216 MG/DL (ref 65–100)
GLUCOSE SERPL-MCNC: 152 MG/DL (ref 65–100)
HCT VFR BLD AUTO: 26.6 % (ref 35–47)
HGB BLD-MCNC: 7.8 G/DL (ref 11.5–16)
IMM GRANULOCYTES # BLD AUTO: 0.1 K/UL (ref 0–0.04)
IMM GRANULOCYTES NFR BLD AUTO: 1 % (ref 0–0.5)
LYMPHOCYTES # BLD: 1.7 K/UL (ref 0.8–3.5)
LYMPHOCYTES NFR BLD: 15 % (ref 12–49)
MCH RBC QN AUTO: 24.5 PG (ref 26–34)
MCHC RBC AUTO-ENTMCNC: 29.3 G/DL (ref 30–36.5)
MCV RBC AUTO: 83.4 FL (ref 80–99)
MONOCYTES # BLD: 0.9 K/UL (ref 0–1)
MONOCYTES NFR BLD: 8 % (ref 5–13)
NEUTS SEG # BLD: 8.2 K/UL (ref 1.8–8)
NEUTS SEG NFR BLD: 76 % (ref 32–75)
NRBC # BLD: 0 K/UL (ref 0–0.01)
NRBC BLD-RTO: 0 PER 100 WBC
PLATELET # BLD AUTO: 345 K/UL (ref 150–400)
PMV BLD AUTO: 9.5 FL (ref 8.9–12.9)
POTASSIUM SERPL-SCNC: 4.1 MMOL/L (ref 3.5–5.1)
PROT SERPL-MCNC: 5.9 G/DL (ref 6.4–8.2)
RBC # BLD AUTO: 3.19 M/UL (ref 3.8–5.2)
SERVICE CMNT-IMP: ABNORMAL
SODIUM SERPL-SCNC: 141 MMOL/L (ref 136–145)
WBC # BLD AUTO: 10.8 K/UL (ref 3.6–11)

## 2019-07-15 PROCEDURE — 74011000250 HC RX REV CODE- 250: Performed by: INTERNAL MEDICINE

## 2019-07-15 PROCEDURE — 94640 AIRWAY INHALATION TREATMENT: CPT

## 2019-07-15 PROCEDURE — 85025 COMPLETE CBC W/AUTO DIFF WBC: CPT

## 2019-07-15 PROCEDURE — 74011250636 HC RX REV CODE- 250/636: Performed by: INTERNAL MEDICINE

## 2019-07-15 PROCEDURE — 80053 COMPREHEN METABOLIC PANEL: CPT

## 2019-07-15 PROCEDURE — 74011636637 HC RX REV CODE- 636/637: Performed by: INTERNAL MEDICINE

## 2019-07-15 PROCEDURE — 74011250637 HC RX REV CODE- 250/637: Performed by: INTERNAL MEDICINE

## 2019-07-15 PROCEDURE — 74011000258 HC RX REV CODE- 258: Performed by: INTERNAL MEDICINE

## 2019-07-15 PROCEDURE — 77030011256 HC DRSG MEPILEX <16IN NO BORD MOLN -A

## 2019-07-15 PROCEDURE — 82962 GLUCOSE BLOOD TEST: CPT

## 2019-07-15 PROCEDURE — 36415 COLL VENOUS BLD VENIPUNCTURE: CPT

## 2019-07-15 PROCEDURE — 94760 N-INVAS EAR/PLS OXIMETRY 1: CPT

## 2019-07-15 RX ORDER — CEFDINIR 300 MG/1
300 CAPSULE ORAL 2 TIMES DAILY
Qty: 14 CAP | Refills: 0 | Status: SHIPPED | OUTPATIENT
Start: 2019-07-15 | End: 2019-07-30

## 2019-07-15 RX ORDER — PANTOPRAZOLE SODIUM 40 MG/1
40 TABLET, DELAYED RELEASE ORAL
Qty: 1 TAB | Refills: 0 | Status: ON HOLD
Start: 2019-07-15 | End: 2020-01-01

## 2019-07-15 RX ADMIN — CEFTRIAXONE SODIUM 1 G: 1 INJECTION, POWDER, FOR SOLUTION INTRAMUSCULAR; INTRAVENOUS at 14:35

## 2019-07-15 RX ADMIN — Medication 10 ML: at 06:50

## 2019-07-15 RX ADMIN — DULOXETINE HYDROCHLORIDE 60 MG: 30 CAPSULE, DELAYED RELEASE ORAL at 08:25

## 2019-07-15 RX ADMIN — APIXABAN 5 MG: 5 TABLET, FILM COATED ORAL at 16:47

## 2019-07-15 RX ADMIN — BUDESONIDE 500 MCG: 0.5 INHALANT RESPIRATORY (INHALATION) at 09:11

## 2019-07-15 RX ADMIN — INSULIN LISPRO 3 UNITS: 100 INJECTION, SOLUTION INTRAVENOUS; SUBCUTANEOUS at 16:46

## 2019-07-15 RX ADMIN — PREDNISONE 10 MG: 5 TABLET ORAL at 16:47

## 2019-07-15 RX ADMIN — MONTELUKAST SODIUM 10 MG: 10 TABLET, FILM COATED ORAL at 08:25

## 2019-07-15 RX ADMIN — APIXABAN 5 MG: 5 TABLET, FILM COATED ORAL at 08:25

## 2019-07-15 RX ADMIN — HYDROCODONE BITARTRATE AND ACETAMINOPHEN 1 TABLET: 5; 325 TABLET ORAL at 09:49

## 2019-07-15 RX ADMIN — Medication 10 ML: at 14:40

## 2019-07-15 RX ADMIN — ARFORMOTEROL TARTRATE 15 MCG: 15 SOLUTION RESPIRATORY (INHALATION) at 09:11

## 2019-07-15 RX ADMIN — VITAMIN D, TAB 1000IU (100/BT) 2000 UNITS: 25 TAB at 08:25

## 2019-07-15 RX ADMIN — PANTOPRAZOLE SODIUM 40 MG: 40 TABLET, DELAYED RELEASE ORAL at 16:47

## 2019-07-15 RX ADMIN — INSULIN LISPRO 2 UNITS: 100 INJECTION, SOLUTION INTRAVENOUS; SUBCUTANEOUS at 08:24

## 2019-07-15 RX ADMIN — PREDNISONE 10 MG: 5 TABLET ORAL at 08:25

## 2019-07-15 RX ADMIN — CLOPIDOGREL BISULFATE 75 MG: 75 TABLET ORAL at 08:25

## 2019-07-15 RX ADMIN — PANTOPRAZOLE SODIUM 40 MG: 40 TABLET, DELAYED RELEASE ORAL at 06:49

## 2019-07-15 NOTE — PROGRESS NOTES
0700- Bedside and Verbal shift change report given to ELIZ Boston (oncoming nurse) by Mike Perez RN (offgoing nurse). Report included the following information SBAR, Kardex, ED Summary, Intake/Output, MAR, Med Rec Status and Cardiac Rhythm NSR.  
 
 
1430- Wound care completed by wound care nurse.

## 2019-07-15 NOTE — WOUND CARE
Wound care consult for right lower leg wound assessment, alert- no distress. GLF prior to admission- skin tear to right lower lateral leg-sutured in the ER on admission. Assessment Currently has [place] nurse to left lower leg wound care- Will return home with care with resumption of orders. Left anterior knee- less than 1 cm partial thickness skin tear red base, resolving no drainage - lower leg skin thin and fragile with scattered bruising on legs and feet- for vascular intervention AUG 1. Right lateral skin tear - L shaped tear - intact sutures and well approximated, no drainage- lindsay wound skin is thin and fragile with bruising. Treatment Mepilex to knee- non adherent to right leg skin tear. Orders written for [place] on discharge- advised. Potential dc to home today.  
Robe Sam

## 2019-07-15 NOTE — PROGRESS NOTES
In preparation for discharge, I have completed AVS med-updates, Care Plans and Education in [facility]. PCP appointment scheduled for tomorrow 7/16 at 3:30 pm (per Dr. Rancho Wilburn) - added to AVS. Educational material added to AVS for new medications. Case Management will set up home health. Will continue to follow. I spoke to Dr. Rancho Wilburn to clarify Protonix and Prilosec. He said patient to to choose which one she wants to use - not to use both. This was added to AVS 
 
1645  Discharge instructions were reviewed with patient. All questions were answered. IV and heart monitor were removed. Patient received a copy of her discharge papers and one prescription (another prescription was electronically sent to her pharmacy). Patient will be discharged home with her family. Case Management set up home health.

## 2019-07-15 NOTE — PROGRESS NOTES
Daily Progress Note and Discharge Note: 7/15/2019 Ashley Gtz MD 
 
Assessment/Plan:  
Pre-syncope and collapse (7/13/2019) / Hypotension. POA. Not LOC or postural tone loss. 2/2 known postural hypotension and dizziness. --Updated echo as under Data Review.  
--PT/OT/Fall precautions recommended outpt. --Holding anti-hypertensives  
  
CAD (coronary artery disease) (10/9/2015). Stable. No CP.  
--Holding BB/ACE-I due to hypotension - decision to restart per Dr Chelle Ley 
--Continue plavix and eliquis 
  
Type 2 diabetes with nephropathy ([facility] (10/1/2018). --resume  saxcagliptin at DC.  
--Diabetic diet  
  
Dyspnea (11/13/2018) / COPD. At baseline per patient. --Continue ICS/LABA, PRN nebs, and chronic prednisone 
  
ARF (acute renal failure) ([facility] (11/13/2018) on CKD3  
--Monitor outpt/recheck 
  
Obesity (BMI 30-39.9) (11/13/2018). Would benefit from weight loss 
  
Atrial fibrillation ([address] (11/16/2018). In sinus at this time (tachycardic but per patient this is baseline). --Continue eliquis 
  
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 --Continue follow-up with vascular surgery 
  
UTI (urinary tract infection) /Sepsis. Failed outpatient therapy as she had been on macrobid for several days for pan-sensitive Klebsiella and UA remains dirty. --IV CTX.  
--UC = Klebsiella - pan sensitive -- DC on Omnicef x 7 days. 
  
Normocytic anemia (7/13/2019). Slightly down from Luciana levels. --Check serologies. --Monitor with wounds and anticoagulation 
  
PAD (peripheral artery disease) / Leg wound, left. --Has plans to undergo procedure to revascularize with Dr. Saray Lovell on August 1, 2019. 
-- Cont eliquis and plavix. Has statin allergy 
  
Leg laceration, right, (7/13/2019). Repaired by ER. --Wound care saw. Cont to monitor outpt. Problem List: 
Problem List as of 7/15/2019 Date Reviewed: 4/26/2019 Codes Class Noted - Resolved * (Principal) Syncope and collapse ICD-10-CM: R55 
ICD-9-CM: 780.2  7/13/2019 - Present UTI (urinary tract infection) ICD-10-CM: N39.0 ICD-9-CM: 599.0  7/13/2019 - Present COPD (chronic obstructive pulmonary disease) ([facility]) ICD-10-CM: J44.9 ICD-9-CM: 036  7/13/2019 - Present Sepsis ([facility] ICD-10-CM: A41.9 ICD-9-CM: 038.9, 995.91  7/13/2019 - Present Normocytic anemia ICD-10-CM: D64.9 ICD-9-CM: 285.9  7/13/2019 - Present PAD (peripheral artery disease) ([facility]) ICD-10-CM: I73.9 ICD-9-CM: 443.9  7/13/2019 - Present Leg wound, left ICD-10-CM: E62.154O ICD-9-CM: 891.0  7/13/2019 - Present Leg laceration, right, initial encounter ICD-10-CM: L20.566T ICD-9-CM: 894.0  7/13/2019 - Present Cellulitis of right upper arm ICD-10-CM: L03.113 ICD-9-CM: 682.3  5/17/2019 - Present Mild intermittent asthma ICD-10-CM: J45.20 ICD-9-CM: 493.90  5/17/2019 - Present Gangrene of finger of right hand [facility]) ICD-10-CM: C11 
ICD-9-CM: 785.4  5/17/2019 - Present Brachial artery thrombus ([facility]) ICD-10-CM: A62.9 ICD-9-CM: 444.21  4/26/2019 - Present Bowel obstruction ([facility] ICD-10-CM: R70.891 ICD-9-CM: 560.9  1/8/2019 - Present Partial small bowel obstruction ([facility] ICD-10-CM: K56.600 ICD-9-CM: 560.9  1/5/2019 - Present Sleep apnea ICD-10-CM: G47.30 ICD-9-CM: 780.57  1/5/2019 - Present Overview Signed 1/5/2019  8:41 PM by James Mcdaniels, DO  
  wears CPAP Atrial fibrillation [facility]) ICD-10-CM: I48.91 
ICD-9-CM: 427.31  11/16/2018 - Present Dyspnea ICD-10-CM: R06.00 
ICD-9-CM: 786.09  11/13/2018 - Present ARF (acute renal failure) (HCC) ICD-10-CM: N17.9 ICD-9-CM: 584.9  11/13/2018 - Present Obesity (BMI 30-39.9) (Chronic) ICD-10-CM: O91.5 ICD-9-CM: 278.00  11/13/2018 - Present Closed wedge compression fracture of T7 vertebra ([facility] ICD-10-CM: T76.042M ICD-9-CM: 805.2  11/13/2018 - Present Type 2 diabetes with nephropathy ([facility] ICD-10-CM: E11.21 
ICD-9-CM: 250.40, 583.81  10/1/2018 - Present Chest pain ICD-10-CM: R07.9 ICD-9-CM: 786.50  6/27/2018 - Present Generalized abdominal pain ICD-10-CM: R10.84 ICD-9-CM: 789.07  6/27/2018 - Present Recurrent deep vein thrombosis (DVT) (HCC) ICD-10-CM: I82.409 ICD-9-CM: 453.40  3/30/2018 - Present Chronic anticoagulation (Chronic) ICD-10-CM: Z79.01 
ICD-9-CM: V58.61  3/30/2018 - Present Coronary artery disease involving native coronary artery without angina pectoris (Chronic) ICD-10-CM: I25.10 ICD-9-CM: 414.01  1/22/2016 - Present Essential hypertension (Chronic) ICD-10-CM: I10 
ICD-9-CM: 401.9  1/22/2016 - Present CAD (coronary artery disease) ICD-10-CM: I25.10 ICD-9-CM: 414.00  10/9/2015 - Present Type II diabetes mellitus (HCC) (Chronic) ICD-10-CM: E11.9 ICD-9-CM: 250.00  10/9/2015 - Present NSTEMI (non-ST elevated myocardial infarction) ([facility] ICD-10-CM: I21.4 ICD-9-CM: 410.70  10/9/2015 - Present RESOLVED: HTN (hypertension) ICD-10-CM: I10 
ICD-9-CM: 401.9  10/9/2015 - 1/22/2016 HPI:  
 Ms. Tramaine Moreno is a 72 y.o.  female with a hx of CAD, COPD, Afib, DM2, DVT, PAD, chronic wounds, obesity who presented to the Emergency Department complaining of GLF. Patient got up from seated position and ambulated across room, she allowed for some equilibration and was walking with her rollator. She became very dizzy and fell, striking the wall with her head and lacerating her right lower leg. No LOC, no post-fall sx, able to get up with some assistance. EMS arrived to find her hypotensive and transported to ER where her BP improved with volume resuscitation but due to ongoing UTI failing outpt treatment, she will be admitted for further management. (Dr Kj Murrieta) 7/14: No further dizziness. Has been up to BR without difficulty.  Having some pain in her leg from the fall and laceration. WBC better. Hb down at 7.7. UC pending. ECHO pending. BP improving.  
 
7/15: She is feeling better. Tolerating Rocephin. Awaiting sensitivity. Hb stable. BP better. UC with gram - rods. Creat improved. 330PM:  Continues to feel better and has been up. Sensitivity with Klebsiella that is pan-sensitive - cover with Omnicef 300mg BID x 7 days. Hold BP meds until seen by Dr Ravindra Simms tomorrow at 330PM in office - appt arranged. Also f/u laceration outpt. Review of Systems: A comprehensive review of systems was negative except for that written in the HPI. Objective:  
Physical Exam:  
 
Visit Vitals /78 (BP 1 Location: Left arm, BP Patient Position: At rest) Pulse 78 Temp 97.6 °F (36.4 °C) Resp 16 Ht 5' 6\" (1.676 m) Wt 220 lb (99.8 kg) SpO2 99% BMI 35.51 kg/m² O2 Device: Room air Temp (24hrs), Av.1 °F (36.7 °C), Min:97.4 °F (36.3 °C), Max:98.7 °F (37.1 °C) No intake/output data recorded.  07 -  1900 In: 2286.7 [P.O.:720; I.V.:1566.7] Out: 850 [Urine:850] General:  Alert, cooperative, no distress, appears stated age. Head:  Normocephalic, without obvious abnormality, atraumatic. Eyes:  Conjunctivae/corneas clear. PERRL, EOMs intact. Nose: Nares normal. Septum midline. Mucosa normal. No drainage or sinus tenderness. Throat: Lips, mucosa, and tongue normal. Teeth and gums normal.  
Neck: Supple, symmetrical, trachea midline, no adenopathy, thyroid: no enlargement/tenderness/nodules, no carotid bruit and no JVD. Back:   Symmetric, no curvature. ROM normal. No CVA tenderness. Lungs:   Clear to auscultation bilaterally. No wheezing. Chest wall:  No tenderness or deformity. Heart:  Regular rate and rhythm, S1, S2 normal, no murmur, click, rub or gallop. Abdomen:   Soft, non-tender. Bowel sounds normal. No masses,  No organomegaly. Extremities: Extremities with bruising upper extremities, RLE with dressing intact, no cyanosis or edema. No calf tenderness or cords. Pulses: 2+ and symmetric all extremities. Skin: Skin color, texture, turgor normal. No rashes or lesions Neurologic: CNII-XII intact. Alert and oriented X 3. Fine motor of hands and fingers normal.   equal.  No cogwheeling or rigidity. Gait not tested at this time. Sensation grossly normal to touch. Gross motor of extremities normal.    
 
Data Review:  
  ECHO 7/14/19 Interpretation Summary · Left Ventricle: Normal cavity size and systolic function (ejection fraction normal). Mildly increased wall thickness. Estimated left ventricular ejection fraction is 61 - 65%. There is a prior study available for comparison that was performed on 6/28/2018. Echo Findings Left Ventricle Normal cavity size and systolic function (ejection fraction normal). Mildly increased wall thickness. The estimated ejection fraction is 61 - 65%. Left Atrium Normal cavity size. Aortic Valve Normal valve structure, trileaflet valve structure, no stenosis and no regurgitation. Mitral Valve Normal valve structure and no regurgitation. Pericardium Insignificant pericardial effusion or fat. Recent Days: 
Recent Labs  
  07/15/19 
0455 07/14/19 
0023 07/13/19 
1317 WBC 10.8 11.7* 17.1* HGB 7.8* 7.7* 8.8* HCT 26.6* 26.8* 30.9*  346 428* Recent Labs  
  07/15/19 
0455 07/14/19 
0023 07/13/19 
1317  139 136  
K 4.1 4.8 4.2 * 110* 106 CO2 22 22 21 * 111* 96 BUN 17 21* 19  
CREA 1.23* 1.55* 1.43* CA 7.7* 7.6* 8.3*  
MG  --  2.1 2.1 PHOS  --  3.4  --   
ALB 2.4*  --  3.0* TBILI 0.2  --  0.4 SGOT 16  --  33 ALT 27  --  38 No results for input(s): PH, PCO2, PO2, HCO3, FIO2 in the last 72 hours. 24 Hour Results: 
Recent Results (from the past 24 hour(s)) GLUCOSE, POC  Collection Time: 07/14/19  6:20 AM  
 Result Value Ref Range Glucose (POC) 105 (H) 65 - 100 mg/dL Performed by Oley Sacks (PCT) ECHO ADULT COMPLETE Collection Time: 07/14/19  9:34 AM  
Result Value Ref Range LA Vol Index 25.84 16 - 28 ml/m2 Aortic Valve Systolic Peak Velocity 667.15 cm/s Aortic Valve Area by Continuity of Peak Velocity 2.4 cm2 AoV PG 10.6 mmHg LVIDd 4.55 3.9 - 5.3 cm  
 LVPWd 1.13 (A) 0.6 - 0.9 cm LVIDs 3.18 cm IVSd 1.23 (A) 0.6 - 0.9 cm  
 LVOT d 2.10 cm LVOT Peak Velocity 112.14 cm/s LVOT Peak Gradient 5.0 mmHg MVA (PHT) 4.8 cm2  
 MV A Andrew 133.47 cm/s  
 MV E Andrew 98.45 cm/s  
 MV E/A 0.74   
 RVIDd 3.80 cm LA Vol 4C 58.60 (A) 22 - 52 mL  
 LA Vol 2C 39.92 22 - 52 mL  
 LA Area 4C 19.7 cm2 LV Mass .8 (A) 67 - 162 g  
 LV Mass AL Index 110.8 43 - 95 g/m2 Est. RA Pressure 3.0 mmHg Mitral Regurgitant Peak Velocity 511.22 cm/s Mitral Valve E Wave Deceleration Time 158.8 ms Mitral Valve Pressure Half-time 46.1 ms Left Atrium Major Axis 3.66 cm Pulmonic Valve Max Velocity 115.75 cm/s  
 LA Vol Index 19.17 16 - 28 ml/m2 LA Vol Index 28.13 16 - 28 ml/m2 MR Peak Gradient 104.5 mmHg ANTOINETTE/BSA Pk Andrew 1.1 cm2/m2 PV peak gradient 5.4 mmHg LA Volume 53.82 22 - 52 mL  
 LV E' Lateral Velocity 8.62 centimeter/second LV E' Septal Velocity 8.46 centimeter/second Tapse 1.66 1.5 - 2.0 cm GLUCOSE, POC Collection Time: 07/14/19 12:21 PM  
Result Value Ref Range Glucose (POC) 100 65 - 100 mg/dL Performed by Sonam Meyers (PCT) GLUCOSE, POC Collection Time: 07/14/19  4:16 PM  
Result Value Ref Range Glucose (POC) 199 (H) 65 - 100 mg/dL Performed by Sonam Meyers (PCT) GLUCOSE, POC Collection Time: 07/14/19  8:52 PM  
Result Value Ref Range Glucose (POC) 198 (H) 65 - 100 mg/dL Performed by Oley Sacks (PCT) CBC WITH AUTOMATED DIFF Collection Time: 07/15/19  4:55 AM  
Result Value Ref Range WBC 10.8 3.6 - 11.0 K/uL  
 RBC 3.19 (L) 3.80 - 5.20 M/uL HGB 7.8 (L) 11.5 - 16.0 g/dL HCT 26.6 (L) 35.0 - 47.0 % MCV 83.4 80.0 - 99.0 FL  
 MCH 24.5 (L) 26.0 - 34.0 PG  
 MCHC 29.3 (L) 30.0 - 36.5 g/dL  
 RDW 18.0 (H) 11.5 - 14.5 % PLATELET 073 973 - 584 K/uL MPV 9.5 8.9 - 12.9 FL  
 NRBC 0.0 0  WBC ABSOLUTE NRBC 0.00 0.00 - 0.01 K/uL NEUTROPHILS 76 (H) 32 - 75 % LYMPHOCYTES 15 12 - 49 % MONOCYTES 8 5 - 13 % EOSINOPHILS 0 0 - 7 % BASOPHILS 0 0 - 1 % IMMATURE GRANULOCYTES 1 (H) 0.0 - 0.5 % ABS. NEUTROPHILS 8.2 (H) 1.8 - 8.0 K/UL  
 ABS. LYMPHOCYTES 1.7 0.8 - 3.5 K/UL  
 ABS. MONOCYTES 0.9 0.0 - 1.0 K/UL  
 ABS. EOSINOPHILS 0.0 0.0 - 0.4 K/UL  
 ABS. BASOPHILS 0.0 0.0 - 0.1 K/UL  
 ABS. IMM. GRANS. 0.1 (H) 0.00 - 0.04 K/UL  
 DF AUTOMATED METABOLIC PANEL, COMPREHENSIVE Collection Time: 07/15/19  4:55 AM  
Result Value Ref Range Sodium 141 136 - 145 mmol/L Potassium 4.1 3.5 - 5.1 mmol/L Chloride 111 (H) 97 - 108 mmol/L  
 CO2 22 21 - 32 mmol/L Anion gap 8 5 - 15 mmol/L Glucose 152 (H) 65 - 100 mg/dL BUN 17 6 - 20 MG/DL Creatinine 1.23 (H) 0.55 - 1.02 MG/DL  
 BUN/Creatinine ratio 14 12 - 20 GFR est AA 52 (L) >60 ml/min/1.73m2 GFR est non-AA 43 (L) >60 ml/min/1.73m2 Calcium 7.7 (L) 8.5 - 10.1 MG/DL Bilirubin, total 0.2 0.2 - 1.0 MG/DL  
 ALT (SGPT) 27 12 - 78 U/L  
 AST (SGOT) 16 15 - 37 U/L Alk. phosphatase 98 45 - 117 U/L Protein, total 5.9 (L) 6.4 - 8.2 g/dL Albumin 2.4 (L) 3.5 - 5.0 g/dL Globulin 3.5 2.0 - 4.0 g/dL A-G Ratio 0.7 (L) 1.1 - 2.2 Medications reviewed Current Facility-Administered Medications Medication Dose Route Frequency  sodium chloride (NS) flush 5-40 mL  5-40 mL IntraVENous Q8H  
 sodium chloride (NS) flush 5-40 mL  5-40 mL IntraVENous PRN  
 cefTRIAXone (ROCEPHIN) 1 g in 0.9% sodium chloride (MBP/ADV) 50 mL  1 g IntraVENous Q24H  acetaminophen (TYLENOL) tablet 650 mg  650 mg Oral Q4H PRN  
 HYDROcodone-acetaminophen (NORCO) 5-325 mg per tablet 1 Tab  1 Tab Oral Q4H PRN  
 naloxone (NARCAN) injection 0.4 mg  0.4 mg IntraVENous PRN  
 ondansetron (ZOFRAN) injection 4 mg  4 mg IntraVENous Q4H PRN  
 bisacodyl (DULCOLAX) tablet 5 mg  5 mg Oral DAILY PRN  
 budesonide (PULMICORT) 500 mcg/2 ml nebulizer suspension  500 mcg Nebulization BID RT  
 arformoterol (BROVANA) neb solution 15 mcg  15 mcg Nebulization BID RT  
 albuterol-ipratropium (DUO-NEB) 2.5 MG-0.5 MG/3 ML  3 mL Nebulization Q4H PRN  
 glucose chewable tablet 16 g  4 Tab Oral PRN  
 glucagon (GLUCAGEN) injection 1 mg  1 mg IntraMUSCular PRN  
 dextrose 10% infusion 125-250 mL  125-250 mL IntraVENous PRN  
 insulin lispro (HUMALOG) injection   SubCUTAneous AC&HS  
 apixaban (ELIQUIS) tablet 5 mg  5 mg Oral BID  cholecalciferol (VITAMIN D3) tablet 2,000 Units  2,000 Units Oral DAILY  clopidogrel (PLAVIX) tablet 75 mg  75 mg Oral DAILY  DULoxetine (CYMBALTA) capsule 60 mg  60 mg Oral DAILY  mirtazapine (REMERON) tablet 15 mg  15 mg Oral QHS  montelukast (SINGULAIR) tablet 10 mg  10 mg Oral DAILY  pantoprazole (PROTONIX) tablet 40 mg  40 mg Oral ACB&D  predniSONE (DELTASONE) tablet 10 mg  10 mg Oral BID WITH MEALS Care Plan discussed with: Patient/Family Total time spent with patient and review of records: 30 minutes.  
 
Theron Faust MD

## 2019-07-15 NOTE — PROGRESS NOTES
Problem: Falls - Risk of 
Goal: *Absence of Falls Description Document Leigh Ann Sanchez Fall Risk and appropriate interventions in the flowsheet. Outcome: Progressing Towards Goal 
  
Problem: Patient Education: Go to Patient Education Activity Goal: Patient/Family Education Outcome: Progressing Towards Goal 
  
Problem: Patient Education: Go to Patient Education Activity Goal: Patient/Family Education Outcome: Progressing Towards Goal 
  
Problem: Patient Education: Go to Patient Education Activity Goal: Patient/Family Education Outcome: Progressing Towards Goal

## 2019-07-15 NOTE — PROGRESS NOTES
7- Reason for Admission:   Dizziness, Fall RRAT Score:    43 Resources/supports as identified by patient/family:   Bonifacio Gomez, daughter ([phone]), and various other family members are supportive and provide transportation Top Challenges facing patient (as identified by patient/family and CM): Finances/Medication cost?   Has prescription drug coverage and uses [facility] on [address]? Family members provide transportation Support system or lack thereof? Good family support Living arrangements? Lives in [place] at [facility] Self-care/ADLs/Cognition? Independent with her ADL's and is alert and oriented Current Advanced Directive/Advance Care Plan:  Full Code Plan for utilizing home health:   [place] RN,PT and OT with [facility] Transition of Care Plan:   Return home I met with the pt to determine potential discharge needs. She plans to return to her apartment at [facility] and signed a choice letter (placed on chart) to resume with [facility]. I sent the referral thru [employer] to [facility] and they have accepted. No further discharge needs identified. Care Management Interventions PCP Verified by CM: Yes(Dr. Анна Davila nurse navigator) Transition of Care Consult (CM Consult): Home Health [address]: No 
Reason [facility]: Patient already serviced by other home care/hospice agency Discharge Durable Medical Equipment: No(Has a rollator) Physical Therapy Consult: Yes Occupational Therapy Consult: Yes Current Support Network: Lives Alone, Own Home Confirm Follow Up Transport: Family Plan discussed with Pt/Family/Caregiver: Yes Freedom of Choice Offered: (S) Yes Discharge Location Discharge Placement: (Home and resume with [facility]) Owen Garcia, ERICW, CM

## 2019-07-15 NOTE — DISCHARGE INSTRUCTIONS
Patient Discharge Instructions    Idalmis Matute / [id number] : 1945    Admitted 2019 Discharged: 7/15/2019 3:34 PM     ACUTE DIAGNOSES:  Syncope and collapse [R55]  UTI (urinary tract infection) [N39.0]    CHRONIC MEDICAL DIAGNOSES:  Problem List as of 7/15/2019 Date Reviewed: 2019          Codes Class Noted - Resolved    * (Principal) Syncope and collapse ICD-10-CM: R55  ICD-9-CM: 780.2  2019 - Present        UTI (urinary tract infection) ICD-10-CM: N39.0  ICD-9-CM: 599.0  2019 - Present        COPD (chronic obstructive pulmonary disease) ([facility] ICD-10-CM: J44.9  ICD-9-CM: 496  2019 - Present        Sepsis ([facility] ICD-10-CM: A41.9  ICD-9-CM: 038.9, 995.91  2019 - Present        Normocytic anemia ICD-10-CM: D64.9  ICD-9-CM: 285.9  2019 - Present        PAD (peripheral artery disease) ([facility] ICD-10-CM: I73.9  ICD-9-CM: 443.9  2019 - Present        Leg wound, left ICD-10-CM: T84.763S  ICD-9-CM: 891.0  2019 - Present        Leg laceration, right, initial encounter ICD-10-CM: S81.811A  ICD-9-CM: 894.0  2019 - Present        Cellulitis of right upper arm ICD-10-CM: L03.113  ICD-9-CM: 682.3  2019 - Present        Mild intermittent asthma ICD-10-CM: J45.20  ICD-9-CM: 493.90  2019 - Present        Gangrene of finger of right hand ([facility] ICD-10-CM: I96  ICD-9-CM: 785.4  2019 - Present        Brachial artery thrombus (HCC) ICD-10-CM: I74.2  ICD-9-CM: 444.21  2019 - Present        Bowel obstruction ([facility] ICD-10-CM: S50.935  ICD-9-CM: 560.9  2019 - Present        Partial small bowel obstruction ([facility] ICD-10-CM: K56.600  ICD-9-CM: 560.9  2019 - Present        Sleep apnea ICD-10-CM: G47.30  ICD-9-CM: 780.57  2019 - Present    Overview Signed 2019  8:41 PM by Tona Amezcua DO     wears CPAP             Atrial fibrillation ([facility] ICD-10-CM: I48.91  ICD-9-CM: 427.31  2018 - Present        Dyspnea ICD-10-CM: R06.00  ICD-9-CM: 786.09  11/13/2018 - Present        ARF (acute renal failure) (HCC) ICD-10-CM: N17.9  ICD-9-CM: 584.9  11/13/2018 - Present        Obesity (BMI 30-39.9) (Chronic) ICD-10-CM: E66.9  ICD-9-CM: 278.00  11/13/2018 - Present        Closed wedge compression fracture of T7 vertebra ([facility] ICD-10-CM: Q09.627B  ICD-9-CM: 805.2  11/13/2018 - Present        Type 2 diabetes with nephropathy ([facility] ICD-10-CM: E11.21  ICD-9-CM: 250.40, 583.81  10/1/2018 - Present        Chest pain ICD-10-CM: R07.9  ICD-9-CM: 786.50  6/27/2018 - Present        Generalized abdominal pain ICD-10-CM: R10.84  ICD-9-CM: 789.07  6/27/2018 - Present        Recurrent deep vein thrombosis (DVT) ([facility] ICD-10-CM: I82.409  ICD-9-CM: 453.40  3/30/2018 - Present        Chronic anticoagulation (Chronic) ICD-10-CM: Z79.01  ICD-9-CM: V58.61  3/30/2018 - Present        Coronary artery disease involving native coronary artery without angina pectoris (Chronic) ICD-10-CM: I25.10  ICD-9-CM: 414.01  1/22/2016 - Present        Essential hypertension (Chronic) ICD-10-CM: I10  ICD-9-CM: 401.9  1/22/2016 - Present        CAD (coronary artery disease) ICD-10-CM: I25.10  ICD-9-CM: 414.00  10/9/2015 - Present        Type II diabetes mellitus ([facility] (Chronic) ICD-10-CM: E11.9  ICD-9-CM: 250.00  10/9/2015 - Present        NSTEMI (non-ST elevated myocardial infarction) ([facility] ICD-10-CM: I21.4  ICD-9-CM: 410.70  10/9/2015 - Present        RESOLVED: HTN (hypertension) ICD-10-CM: I10  ICD-9-CM: 401.9  10/9/2015 - 1/22/2016              DISCHARGE MEDICATIONS:         · It is important that you take the medication exactly as they are prescribed. · Keep your medication in the bottles provided by the pharmacist and keep a list of the medication names, dosages, and times to be taken in your wallet. · Do not take other medications without consulting your doctor.        DIET:  Diabetic Diet    ACTIVITY: Activity as tolerated    ADDITIONAL INFORMATION: If you experience any of the following symptoms then please call your primary care physician or return to the emergency room if you cannot get hold of your doctor: Fever, chills, nausea, vomiting, diarrhea, change in mentation, falling, bleeding, shortness of breath. FOLLOW UP CARE:  Dr. Kp Cantu MD  Tomorrow at 330PM.    Follow-up with specialists at directed by them      Information obtained by :  I understand that if any problems occur once I am at home I am to contact my physician. I understand and acknowledge receipt of the instructions indicated above.                                                                                                                                            Physician's or R.N.'s Signature                                                                  Date/Time                                                                                                                                              Patient or Representative Signature                                                          Date/Time

## 2019-07-15 NOTE — CDMP QUERY
Pt admitted with sepsis 2/2 UTI, and documentation of \"questionable acute renal failure, vs just evolution of CKD is rendered. After careful further studies please further specify if you are treating any of the following 
 
 
=> CKD 3 
=> ARF (acute renal failure) on CKD 3 
=> ARF (acute renal failure) 
=> Other explanation (please specify) 
=> Clinically unable to determine. Risk factors diabetes with nephropathy, obesity Clinical Indicators: H and P and progress notes \" ARF, dx in question she had previously nml SCr levels but last two checks have been at 1.4 with suggestion evolution of CKD. \" This admission serum creatinine ranges 1.23 to 1.55 with GFR of 33 to 43. Prior to this admission most recent creatnine 1.41 and 1.03. Treatment: IV hydration and serial monitoring of labs Please clarify and document your clinical opinion in the progress notes and discharge summary including the definitive and/or presumptive diagnosis, (suspected or probable), related to the above clinical findings. Please include clinical findings supporting your diagnosis. Thank you for your time. Annabelle Chong RN, BSN 
[phone]

## 2019-07-15 NOTE — DISCHARGE SUMMARY
Physician Discharge Summary Patient ID:   
Elieser Sims [id number] 
11 y.o. 
1945 Raymundo Schmitz MD 
 
Admit date: 7/13/2019 Discharge date and time: 7/15/2019 Admission Diagnoses: Syncope and collapse [R55];UTI (urinary tract infection) [N39.0] Chronic Diagnoses:   
Problem List as of 7/15/2019 Date Reviewed: 4/26/2019 Codes Class Noted - Resolved * (Principal) Syncope and collapse ICD-10-CM: R55 
ICD-9-CM: 780.2  7/13/2019 - Present UTI (urinary tract infection) ICD-10-CM: N39.0 ICD-9-CM: 599.0  7/13/2019 - Present COPD (chronic obstructive pulmonary disease) ([facility]) ICD-10-CM: J44.9 ICD-9-CM: 013  7/13/2019 - Present Sepsis ([facility] ICD-10-CM: A41.9 ICD-9-CM: 038.9, 995.91  7/13/2019 - Present Normocytic anemia ICD-10-CM: D64.9 ICD-9-CM: 285.9  7/13/2019 - Present PAD (peripheral artery disease) ([facility]) ICD-10-CM: I73.9 ICD-9-CM: 443.9  7/13/2019 - Present Leg wound, left ICD-10-CM: W34.379W ICD-9-CM: 891.0  7/13/2019 - Present Leg laceration, right, initial encounter ICD-10-CM: X78.321M ICD-9-CM: 894.0  7/13/2019 - Present Cellulitis of right upper arm ICD-10-CM: L03.113 ICD-9-CM: 682.3  5/17/2019 - Present Mild intermittent asthma ICD-10-CM: J45.20 ICD-9-CM: 493.90  5/17/2019 - Present Gangrene of finger of right hand [facility]) ICD-10-CM: L09 
ICD-9-CM: 785.4  5/17/2019 - Present Brachial artery thrombus (HCC) ICD-10-CM: C36.3 ICD-9-CM: 444.21  4/26/2019 - Present Bowel obstruction ([address] ICD-10-CM: T13.778 ICD-9-CM: 560.9  1/8/2019 - Present Partial small bowel obstruction ([facility] ICD-10-CM: K56.600 ICD-9-CM: 560.9  1/5/2019 - Present Sleep apnea ICD-10-CM: G47.30 ICD-9-CM: 780.57  1/5/2019 - Present Overview Signed 1/5/2019  8:41 PM by James Mcdaniels,   
  wears CPAP Atrial fibrillation [facility]) ICD-10-CM: I48.91 
ICD-9-CM: 427.31  11/16/2018 - Present Dyspnea ICD-10-CM: R06.00 
ICD-9-CM: 786.09  11/13/2018 - Present ARF (acute renal failure) (HCC) ICD-10-CM: N17.9 ICD-9-CM: 584.9  11/13/2018 - Present Obesity (BMI 30-39.9) (Chronic) ICD-10-CM: Y61.9 ICD-9-CM: 278.00  11/13/2018 - Present Closed wedge compression fracture of T7 vertebra ([facility] ICD-10-CM: S54.908Z ICD-9-CM: 805.2  11/13/2018 - Present Type 2 diabetes with nephropathy ([facility] ICD-10-CM: E11.21 
ICD-9-CM: 250.40, 583.81  10/1/2018 - Present Chest pain ICD-10-CM: R07.9 ICD-9-CM: 786.50  6/27/2018 - Present Generalized abdominal pain ICD-10-CM: R10.84 ICD-9-CM: 789.07  6/27/2018 - Present Recurrent deep vein thrombosis (DVT) (HCC) ICD-10-CM: I82.409 ICD-9-CM: 453.40  3/30/2018 - Present Chronic anticoagulation (Chronic) ICD-10-CM: Z79.01 
ICD-9-CM: V58.61  3/30/2018 - Present Coronary artery disease involving native coronary artery without angina pectoris (Chronic) ICD-10-CM: I25.10 ICD-9-CM: 414.01  1/22/2016 - Present Essential hypertension (Chronic) ICD-10-CM: I10 
ICD-9-CM: 401.9  1/22/2016 - Present CAD (coronary artery disease) ICD-10-CM: I25.10 ICD-9-CM: 414.00  10/9/2015 - Present Type II diabetes mellitus (HCC) (Chronic) ICD-10-CM: E11.9 ICD-9-CM: 250.00  10/9/2015 - Present NSTEMI (non-ST elevated myocardial infarction) ([facility] ICD-10-CM: I21.4 ICD-9-CM: 410.70  10/9/2015 - Present RESOLVED: HTN (hypertension) ICD-10-CM: I10 
ICD-9-CM: 401.9  10/9/2015 - 1/22/2016 Discharge Medications:  
Current Discharge Medication List  
  
START taking these medications Details  
cefdinir (OMNICEF) 300 mg capsule Take 1 Cap by mouth two (2) times a day. Qty: 14 Cap, Refills: 0 CONTINUE these medications which have CHANGED Details  
pantoprazole (PROTONIX) 40 mg tablet Take 1 Tab by mouth Before breakfast and dinner. Qty: 1 Tab, Refills: 0 CONTINUE these medications which have NOT CHANGED Details  
magnesium oxide (MAG-OX) 400 mg tablet Take 400 mg by mouth nightly. montelukast (SINGULAIR) 10 mg tablet Take 10 mg by mouth daily. potassium chloride SR (KLOR-CON 10) 10 mEq tablet Take 10 mEq by mouth two (2) times a day. predniSONE (DELTASONE) 10 mg tablet Take 10 mg by mouth two (2) times daily (with meals). apixaban (ELIQUIS) 5 mg tablet Take 1 Tab by mouth two (2) times a day. Qty: 60 Tab, Refills: 0 Omeprazole delayed release (PRILOSEC D/R) 20 mg tablet Take 20 mg by mouth two (2) times a day. ibandronate (BONIVA) 150 mg tablet Take 150 mg by mouth every thirty (30) days. Last dose 4/1/19  
  
promethazine (PHENERGAN) 25 mg tablet Take 25 mg by mouth every eight (8) hours as needed for Nausea (Nausea not relieved by ondansetron). mirtazapine (REMERON) 15 mg tablet Take 15 mg by mouth nightly. tiotropium (SPIRIVA WITH HANDIHALER) 18 mcg inhalation capsule Take 1 Cap by inhalation daily. albuterol (PROVENTIL VENTOLIN) 2.5 mg /3 mL (0.083 %) nebulizer solution 2.5 mg by Nebulization route every six (6) hours as needed for Wheezing or Shortness of Breath. albuterol (PROAIR HFA) 90 mcg/actuation inhaler Take 2 Puffs by inhalation every four (4) hours as needed. clopidogrel (PLAVIX) 75 mg tab Take 75 mg by mouth daily. lactobacillus rhamnosus gg 10 billion cell (CULTURELLE) 10 billion cell capsule Take 1 Cap by mouth daily. biotin 10,000 mcg cap Take 1 Cap by mouth daily. saxagliptin (ONGLYZA) 5 mg tab tablet Take 5 mg by mouth daily. DULoxetine (CYMBALTA) 60 mg capsule Take 60 mg by mouth daily. cholecalciferol (VITAMIN D3) 1,000 unit tablet Take 2,000 Units by mouth daily. azelastine-fluticasone (DYMISTA) 137-50 mcg/spray spry 2 Sprays by Nasal route two (2) times a day. mometasone-formoterol (DULERA) 200-5 mcg/actuation HFA inhaler Take 2 Puffs by inhalation two (2) times a day. STOP taking these medications  
  
 metoprolol tartrate (LOPRESSOR) 25 mg tablet Comments:  
Reason for Stopping:   
   
 lisinopril (PRINIVIL, ZESTRIL) 5 mg tablet Comments:  
Reason for Stopping: Follow up Care: 1. Aldair Nova MD tomorrow Diet:  Diabetic Diet Disposition: 
Home. Advanced Directive: 
Discharge Exam: [See today's progress note.] CONSULTATIONS: None Significant Diagnostic Studies:  
Recent Labs  
  07/15/19 
0455 07/14/19 
0023 WBC 10.8 11.7* HGB 7.8* 7.7* HCT 26.6* 26.8*  
 346 Recent Labs  
  07/15/19 
0455 07/14/19 
0023 07/13/19 
1317  139 136  
K 4.1 4.8 4.2 * 110* 106 CO2 22 22 21 BUN 17 21* 19  
CREA 1.23* 1.55* 1.43* * 111* 96  
CA 7.7* 7.6* 8.3*  
MG  --  2.1 2.1 PHOS  --  3.4  --   
 
Recent Labs  
  07/15/19 
0455 07/13/19 
1317 SGOT 16 33 ALT 27 38 AP 98 127* TBILI 0.2 0.4 TP 5.9* 6.9 ALB 2.4* 3.0*  
GLOB 3.5 3.9 Recent Labs  
  07/13/19 
1637 TIBC 321 PSAT 8*  
FERR 32 Lab Results Component Value Date/Time Glucose (POC) 127 (H) 07/15/2019 11:05 AM  
 Glucose (POC) 144 (H) 07/15/2019 06:21 AM  
 Glucose (POC) 198 (H) 07/14/2019 08:52 PM  
 Glucose (POC) 199 (H) 07/14/2019 04:16 PM  
 Glucose (POC) 100 07/14/2019 12:21 PM  
 
Lab Results Component Value Date/Time TSH 0.58 11/13/2018 03:26 PM  
 
 
HOSPITAL COURSE & TREATMENT RENDERED:  
1. See today's progress note: 
 
Daily Progress Note and Discharge Note: 7/15/2019 Nathan Nova MD 
   
Assessment/Plan:  
Pre-syncope and collapse (7/13/2019) / Hypotension. POA. Not LOC or postural tone loss. 2/2 known postural hypotension and dizziness. --Updated echo as under Data Review.  
--PT/OT/Fall precautions recommended outpt.  
--Holding anti-hypertensives  
  
 CAD (coronary artery disease) (10/9/2015). Stable. No CP.  
--Holding BB/ACE-I due to hypotension - decision to restart per Dr Marcell Goldmann 
--Continue plavix and eliquis 
  
Type 2 diabetes with nephropathy ([facility] (10/1/2018). --resume  saxcagliptin at DC.  
--Diabetic diet  
  
Dyspnea (11/13/2018) / COPD. At baseline per patient. --Continue ICS/LABA, PRN nebs, and chronic prednisone 
  
ARF (acute renal failure) ([facility] (11/13/2018) on CKD3  
--Monitor outpt/recheck 
  
Obesity (BMI 30-39.9) (11/13/2018). Would benefit from weight loss 
  
Atrial fibrillation ([facility] (11/16/2018). In sinus at this time (tachycardic but per patient this is baseline). --Continue eliquis 
  
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 --Continue follow-up with vascular surgery 
  
UTI (urinary tract infection) /Sepsis. Failed outpatient therapy as she had been on macrobid for several days for pan-sensitive Klebsiella and UA remains dirty. --IV CTX.  
--UC = Klebsiella - pan sensitive -- DC on Omnicef x 7 days. 
  
Normocytic anemia (7/13/2019). Slightly down from Luciana levels. --Check serologies. --Monitor with wounds and anticoagulation 
  
PAD (peripheral artery disease) / Leg wound, left. --Has plans to undergo procedure to revascularize with Dr. Eric Walsh on August 1, 2019. 
-- Cont eliquis and plavix. Has statin allergy 
  
Leg laceration, right, (7/13/2019). Repaired by ER. --Wound care saw. Cont to monitor outpt.   
  
   
   
HPI:  
 Ms. Jalloh is a 72 y.o.   female with a hx of CAD, COPD, Afib, DM2, DVT, PAD, chronic wounds, obesity who presented to the Emergency Department complaining of GLF. Patient got up from seated position and ambulated across room, she allowed for some equilibration and was walking with her rollator. She became very dizzy and fell, striking the wall with her head and lacerating her right lower leg. No LOC, no post-fall sx, able to get up with some assistance. EMS arrived to find her hypotensive and transported to ER where her BP improved with volume resuscitation but due to ongoing UTI failing outpt treatment, she will be admitted for further management. (Dr Katie Loredo) 
  
: No further dizziness. Has been up to BR without difficulty. Having some pain in her leg from the fall and laceration. WBC better. Hb down at 7.7. UC pending. ECHO pending. BP improving.  
  
7/15: She is feeling better. Tolerating Rocephin. Awaiting sensitivity. Hb stable. BP better. UC with gram - rods. Creat improved. 330PM:  Continues to feel better and has been up. Sensitivity with Klebsiella that is pan-sensitive - cover with Omnicef 300mg BID x 7 days. Hold BP meds until seen by Dr Ranjana Whitehead tomorrow at 330PM in office - appt arranged. Also f/u laceration outpt.   
  
Review of Systems: A comprehensive review of systems was negative except for that written in the HPI. 
  
Objective:  
Physical Exam:  
Visit Vitals /78 (BP 1 Location: Left arm, BP Patient Position: At rest) Pulse 78 Temp 97.6 °F (36.4 °C) Resp 16 Ht 5' 6\" (1.676 m) Wt 220 lb (99.8 kg) SpO2 99% BMI 35.51 kg/m² O2 Device: Room air Temp (24hrs), Av.1 °F (36.7 °C), Min:97.4 °F (36.3 °C), Max:98.7 °F (37.1 °C) No intake/output data recorded.  0701 -  1900 In: 2286.7 [P.O.:720; I.V.:1566.7] Out: 850 [Urine:850] General:  Alert, cooperative, no distress, appears stated age. Head:  Normocephalic, without obvious abnormality, atraumatic. Eyes:  Conjunctivae/corneas clear. PERRL, EOMs intact. Nose: Nares normal. Septum midline. Mucosa normal. No drainage or sinus tenderness. Throat: Lips, mucosa, and tongue normal. Teeth and gums normal.  
Neck: Supple, symmetrical, trachea midline, no adenopathy, thyroid: no enlargement/tenderness/nodules, no carotid bruit and no JVD. Back:   Symmetric, no curvature. ROM normal. No CVA tenderness. Lungs:   Clear to auscultation bilaterally. No wheezing. Chest wall:  No tenderness or deformity. Heart:  Regular rate and rhythm, S1, S2 normal, no murmur, click, rub or gallop. Abdomen:   Soft, non-tender. Bowel sounds normal. No masses,  No organomegaly. Extremities: Extremities with bruising upper extremities, RLE with dressing intact, no cyanosis or edema. No calf tenderness or cords. Pulses: 2+ and symmetric all extremities. Skin: Skin color, texture, turgor normal. No rashes or lesions Neurologic: CNII-XII intact. Alert and oriented X 3. Fine motor of hands and fingers normal.   equal.  No cogwheeling or rigidity. Gait not tested at this time. Sensation grossly normal to touch. Gross motor of extremities normal.    
  
Data Review:  
  ECHO 7/14/19 Interpretation Summary · Left Ventricle: Normal cavity size and systolic function (ejection fraction normal). Mildly increased wall thickness. Estimated left ventricular ejection fraction is 61 - 65%.    
  
There is a prior study available for comparison that was performed on 6/28/2018. Echo Findings  
  Left Ventricle Normal cavity size and systolic function (ejection fraction normal). Mildly increased wall thickness. The estimated ejection fraction is 61 - 65%. Left Atrium Normal cavity size. Aortic Valve Normal valve structure, trileaflet valve structure, no stenosis and no regurgitation. Mitral Valve Normal valve structure and no regurgitation. Pericardium Insignificant pericardial effusion or fat. Signed: 
Marge Alonzo MD 
7/15/2019 3:35 PM

## 2019-07-15 NOTE — PROGRESS NOTES
Physical therapy 1210 Chart reviewed, spoke with RN. Pt received in bed, reporting she is \"feeling fine\" and up ad shelton in room. declining OOB activity with PT reporting she was told to keep her leg elevated. PT will continue to follow Suzan Gregory

## 2019-07-16 NOTE — PERIOP NOTES
Cristian responded, yes, to cardiac clearance appt. Spoke w/pt @ this time & she agrees to call his office to schedule appt. Attempting to see him before PAT appt.

## 2019-07-26 NOTE — PERIOP NOTES
Contact isolation order placed under signed and held orders for the day of surgery. History of MRSA.      Date of surgery: 8-1-2019

## 2019-07-29 NOTE — PROGRESS NOTES
Fahad Isbell MD    [id number] [facility] [place], [address] [facility]    Office [phone],Y [phone]  Pager [phone]    Radames Johnston is a 72 y.o. female is here for f/u visit  CAD    Primary care physician:  Neftali Cordero MD    Patient Active Problem List   Diagnosis Code    CAD (coronary artery disease) I25.10    Type II diabetes mellitus ([facility] E11.9    NSTEMI (non-ST elevated myocardial infarction) ([facility] I21.4    Coronary artery disease involving native coronary artery without angina pectoris I25.10    Essential hypertension I10    Recurrent deep vein thrombosis (DVT) ([facility] I82.409    Chronic anticoagulation Z79.01    Chest pain R07.9    Generalized abdominal pain R10.84    Type 2 diabetes with nephropathy ([facility] E11.21    Dyspnea R06.00    ARF (acute renal failure) ([facility] N17.9    Obesity (BMI 30-39. 9) E66.9    Closed wedge compression fracture of T7 vertebra ([facility]) S22.060A    Partial small bowel obstruction ([facility]) K56.600    Sleep apnea G47.30    Atrial fibrillation ([facility]) I48.91    Bowel obstruction ([facility]) K56.609    Brachial artery thrombus ([facility]) I74.2    Cellulitis of right upper arm L03. 113    Mild intermittent asthma J45.20    Gangrene of finger of right hand ([facility]) I96    Syncope and collapse R55    UTI (urinary tract infection) N39.0    COPD (chronic obstructive pulmonary disease) ([facility]) J44.9    Sepsis ([facility]) A41.9    Normocytic anemia D64.9    PAD (peripheral artery disease) ([facility]) I73.9    Leg wound, left S81.802A    Leg laceration, right, initial encounter S81.811A    Severe obesity ([facility] E66.01       Chief complaint:  Chief Complaint   Patient presents with    Pre-op Exam     Patient presents today for pre-op exam for surgery on this Thursday - Left Femoral Endarterectomy - Dr Hanna Sanchez       Dear Dr Kerline Gold,    I had the pleasure of seeing Ms Radames Johnston in the office today.       Assessment:  CAD s/p PCI to RCA with BMS ( NSTEMI 10/2015); 6/6/18- LAD stent/D1 PTCA ( [facility])  HTN - low  HLD - intolerant to multiple statins sec to myalgia  DM - \" BS up and down\"/Not well controlled  History of right DVT-October 2016; Has had prior DVT 1998  Chronic anticoagulation   Asthma - f/by Dr Murry Gowers  PAD - followed by Dr Camelia Trent. Scheduled for percutaneous intervention left lower extremity  6/12/2019-gangrene of right index/middle/ring finger-amputation by Dr. Hermann Aguirre at [facility]        Plan:       Can proceed with her percutaneous vascular procedure left lower extremity. After her recent discharge from the hospital, she is only on metoprolol 25 mg twice daily as needed. She holds it when her blood pressure is less than 069 systolic. She also has not needed any diuretics. Not on statin ( allergic/intolerant) -per patient her cholesterol is doing well. Has been intolerant to Lipitor, Crestor, Zocor-myalgias. She had her lipids checked in February 2018 ( PCP)  and it was elevated. We have tried to get her PCSK9 inhibitors. However the cost is prohibitive and the nurse navigator working on it. Has tried Zetia but had side effects including muscle aches. Continue Eliquis/Plavix post left lower extremity procedure when safe to do so. Aggressive cardiovascular risk factor modification. Follow-up in12 weeks. Patient understands the plan. All questions were answered to the patient's satisfaction. Medication Side Effects and Warnings were discussed with patient: yes  Patient Labs were reviewed and or requested:  yes  Patient Past Records were reviewed and or requested: yes    I appreciate the opportunity to be involved in Ms. Sellers. See note below for details. Please do not hesitate to contact us with questions or concerns. Kianna Barton MD    Cardiac Testing/ Procedures: A. Cardiac Cath/PCI: 6/6/18   At [address] - LAD stent/PTCA of D1    10/9/15 [address]: Medl;Nml    LAD: Prox- Med; 50%;  Distal - small; D1 - Small to med - prox 60%    LCflex: Large; Tortuous; MLI; GUILLERMO - med - MLI    RCA: Med; Prox 80%; Distal 95% just before bifurcation into small PDA and PLB    LVEDP: 22    LVEF: 55%/ Mild basal inf hypokinesis    No significant gradient across aortic valve. PCI: JR4 guide/BMW  Pre dil with 2 by 12 balloon  BMS 2.25 by 22 deployed at [place] distally  BMS 2.5 by 18 deployed at [place] proximal lesion  Post dil with 2.5 by 15      B. ECHO/MEE: Echo 7/14/2019-EF 61 to 65%, mild LVH  6/2018 - Left ventricle: Systolic function was normal. Ejection fraction was  estimated to be 60 %. There were no regional wall motion abnormalities. Doppler parameters were consistent with abnormal left ventricular  relaxation (grade 1 diastolic dysfunction). Aortic valve: Leaflets exhibited normal cuspal separation and good  mobility. Not well visualized. 10/11/15 - Left ventricle: Systolic function was vigorous. Ejection fraction was  estimated in the range of 65 % to 70 %. There were no regional wall motion  abnormalities. C.StressNuclear/Stress ECHO/Stress test:    D.Vascular:    E. EP:    F. Miscellaneous:    Subjective:  Dafne Ba is a 72 y.o. female who returns for follow up  Visit. Recent hospital admission 7/13/2019 to 7/15/2019 for syncope. Probably secondary to hypotension secondary to being on antihypertensive meds/UTI/Sepsis. No further episodes since being off some of her hypertensive medications. Scheduled to undergo left lower extremity percutaneous intervention by Dr. Dorena Jeans. No CP/ C/o occ wheezing/dyspnea on exertion. She is now on Eliquis and Plavix. Currently on hold prior to her procedure. History of recurrent DVT. ROS:cc  (bold if positive, if negative)     edema         Medications before admission:    Current Outpatient Medications   Medication Sig Dispense    metoprolol tartrate (LOPRESSOR) 25 mg tablet Take 25 mg by mouth daily as needed.  For systolic >420     glipiZIDE SR (GLUCOTROL XL) 2.5 mg CR tablet Take  by mouth nightly.  pantoprazole (PROTONIX) 40 mg tablet Take 1 Tab by mouth Before breakfast and dinner. 1 Tab    magnesium oxide (MAG-OX) 400 mg tablet Take 400 mg by mouth nightly.  montelukast (SINGULAIR) 10 mg tablet Take 10 mg by mouth daily.  potassium chloride SR (KLOR-CON 10) 10 mEq tablet Take 10 mEq by mouth two (2) times a day.  predniSONE (DELTASONE) 10 mg tablet Take 10 mg by mouth two (2) times daily (with meals).  apixaban (ELIQUIS) 5 mg tablet Take 1 Tab by mouth two (2) times a day. 60 Tab    Omeprazole delayed release (PRILOSEC D/R) 20 mg tablet Take 20 mg by mouth two (2) times a day.  ibandronate (BONIVA) 150 mg tablet Take 150 mg by mouth every thirty (30) days. Last dose 7/1/19     promethazine (PHENERGAN) 25 mg tablet Take 25 mg by mouth every eight (8) hours as needed for Nausea (Nausea not relieved by ondansetron).  mirtazapine (REMERON) 15 mg tablet Take 15 mg by mouth nightly.  tiotropium (SPIRIVA WITH HANDIHALER) 18 mcg inhalation capsule Take 1 Cap by inhalation daily.  albuterol (PROVENTIL VENTOLIN) 2.5 mg /3 mL (0.083 %) nebulizer solution 2.5 mg by Nebulization route every six (6) hours as needed for Wheezing or Shortness of Breath.  albuterol (PROAIR HFA) 90 mcg/actuation inhaler Take 2 Puffs by inhalation every four (4) hours as needed.  clopidogrel (PLAVIX) 75 mg tab Take 75 mg by mouth every evening.  lactobacillus rhamnosus gg 10 billion cell (CULTURELLE) 10 billion cell capsule Take 1 Cap by mouth daily.  biotin 10,000 mcg cap Take 1 Cap by mouth daily.  saxagliptin (ONGLYZA) 5 mg tab tablet Take 5 mg by mouth daily.  DULoxetine (CYMBALTA) 60 mg capsule Take 60 mg by mouth daily.  cholecalciferol (VITAMIN D3) 1,000 unit tablet Take 2,000 Units by mouth daily.  azelastine-fluticasone (DYMISTA) 137-50 mcg/spray spry 2 Sprays by Nasal route two (2) times a day.      mometasone-formoterol (DULERA) 200-5 mcg/actuation HFA inhaler Take 2 Puffs by inhalation two (2) times a day. No current facility-administered medications for this visit. Physical Exam:  Visit Vitals  /58 (BP 1 Location: Left arm, BP Patient Position: Sitting)   Pulse 98   Resp 16   Ht 5' 6\" (1.676 m)   Wt 234 lb (106.1 kg)   SpO2 98%   BMI 37.77 kg/m²          Gen: Well-developed, well-nourished, in no acute distress, Obese  Neck: Supple,No JVD, No Carotid Bruit,   Resp: No accessory muscle use, Bilat  rhonchi  Card: Regular Rate,Rythm,Normal S1, S2, No murmurs, rubs or gallop. No thrills.    Abd:  Soft, non-tender, non-distended,BS+,   MSK: No cyanosis  Skin: Ecchymosis+  Neuro: moving all four extremities , follows commands appropriately  Psych:  Good insight, oriented to person, place , alert, Nml Affect  LE: 1+edema    EKG:  July 13 EKG reviewed - Sinus Tach/LAFB/NSTT        LABS:        Lab Results   Component Value Date/Time    WBC 10.8 07/15/2019 04:55 AM    HGB 7.8 (L) 07/15/2019 04:55 AM    HCT 26.6 (L) 07/15/2019 04:55 AM    PLATELET 801 75/50/9488 04:55 AM    MCV 83.4 07/15/2019 04:55 AM     Lab Results   Component Value Date/Time    Sodium 141 07/15/2019 04:55 AM    Potassium 4.1 07/15/2019 04:55 AM    Chloride 111 (H) 07/15/2019 04:55 AM    CO2 22 07/15/2019 04:55 AM    Anion gap 8 07/15/2019 04:55 AM    Glucose 152 (H) 07/15/2019 04:55 AM    BUN 17 07/15/2019 04:55 AM    Creatinine 1.23 (H) 07/15/2019 04:55 AM    BUN/Creatinine ratio 14 07/15/2019 04:55 AM    GFR est AA 52 (L) 07/15/2019 04:55 AM    GFR est non-AA 43 (L) 07/15/2019 04:55 AM    Calcium 7.7 (L) 07/15/2019 04:55 AM       Lab Results   Component Value Date/Time    aPTT 49.6 (H) 04/27/2019 03:38 AM     Lab Results   Component Value Date/Time    INR 1.1 04/26/2019 10:52 AM    INR 1.1 10/17/2018 12:53 PM    Prothrombin time 11.3 (H) 04/26/2019 10:52 AM    Prothrombin time 11.2 10/17/2018 12:53 PM     No components found for:  Kourtney Hollis MD

## 2019-07-30 ENCOUNTER — HOSPITAL ENCOUNTER (OUTPATIENT)
Dept: PREADMISSION TESTING | Age: 74
Discharge: HOME | DRG: 254 | End: 2019-07-30
Payer: MEDICARE

## 2019-07-30 ENCOUNTER — OFFICE VISIT (OUTPATIENT)
Dept: CARDIOLOGY CLINIC | Age: 74
End: 2019-07-30

## 2019-07-30 VITALS
DIASTOLIC BLOOD PRESSURE: 58 MMHG | SYSTOLIC BLOOD PRESSURE: 142 MMHG | HEIGHT: 66 IN | OXYGEN SATURATION: 98 % | BODY MASS INDEX: 37.61 KG/M2 | RESPIRATION RATE: 16 BRPM | HEART RATE: 98 BPM | WEIGHT: 234 LBS

## 2019-07-30 VITALS
RESPIRATION RATE: 18 BRPM | HEIGHT: 66 IN | DIASTOLIC BLOOD PRESSURE: 70 MMHG | OXYGEN SATURATION: 97 % | TEMPERATURE: 98 F | BODY MASS INDEX: 37.61 KG/M2 | SYSTOLIC BLOOD PRESSURE: 143 MMHG | HEART RATE: 112 BPM | WEIGHT: 234 LBS

## 2019-07-30 DIAGNOSIS — I73.9 PAD (PERIPHERAL ARTERY DISEASE) (HCC): ICD-10-CM

## 2019-07-30 DIAGNOSIS — E66.01 OBESITY, MORBID (HCC): ICD-10-CM

## 2019-07-30 DIAGNOSIS — R06.00 DYSPNEA, UNSPECIFIED TYPE: ICD-10-CM

## 2019-07-30 DIAGNOSIS — Z79.01 CHRONIC ANTICOAGULATION: ICD-10-CM

## 2019-07-30 DIAGNOSIS — I25.10 CORONARY ARTERY DISEASE INVOLVING NATIVE CORONARY ARTERY OF NATIVE HEART WITHOUT ANGINA PECTORIS: Primary | ICD-10-CM

## 2019-07-30 DIAGNOSIS — I10 ESSENTIAL HYPERTENSION: ICD-10-CM

## 2019-07-30 DIAGNOSIS — E66.01 SEVERE OBESITY (HCC): ICD-10-CM

## 2019-07-30 LAB
ABO + RH BLD: NORMAL
ALBUMIN SERPL-MCNC: 3.2 G/DL (ref 3.5–5)
ALBUMIN/GLOB SERPL: 0.9 {RATIO} (ref 1.1–2.2)
ALP SERPL-CCNC: 132 U/L (ref 45–117)
ALT SERPL-CCNC: 31 U/L (ref 12–78)
ANION GAP SERPL CALC-SCNC: 6 MMOL/L (ref 5–15)
APTT PPP: 22.6 SEC (ref 22.1–32)
AST SERPL-CCNC: 18 U/L (ref 15–37)
BASOPHILS # BLD: 0 K/UL (ref 0–0.1)
BASOPHILS NFR BLD: 0 % (ref 0–1)
BILIRUB SERPL-MCNC: 0.3 MG/DL (ref 0.2–1)
BLOOD GROUP ANTIBODIES SERPL: NORMAL
BUN SERPL-MCNC: 29 MG/DL (ref 6–20)
BUN/CREAT SERPL: 22 (ref 12–20)
CALCIUM SERPL-MCNC: 9.2 MG/DL (ref 8.5–10.1)
CHLORIDE SERPL-SCNC: 108 MMOL/L (ref 97–108)
CO2 SERPL-SCNC: 29 MMOL/L (ref 21–32)
CREAT SERPL-MCNC: 1.3 MG/DL (ref 0.55–1.02)
DIFFERENTIAL METHOD BLD: ABNORMAL
EOSINOPHIL # BLD: 0.1 K/UL (ref 0–0.4)
EOSINOPHIL NFR BLD: 1 % (ref 0–7)
ERYTHROCYTE [DISTWIDTH] IN BLOOD BY AUTOMATED COUNT: 17.4 % (ref 11.5–14.5)
GLOBULIN SER CALC-MCNC: 3.6 G/DL (ref 2–4)
GLUCOSE SERPL-MCNC: 65 MG/DL (ref 65–100)
HCT VFR BLD AUTO: 31.2 % (ref 35–47)
HGB BLD-MCNC: 8.6 G/DL (ref 11.5–16)
IMM GRANULOCYTES # BLD AUTO: 0.3 K/UL (ref 0–0.04)
IMM GRANULOCYTES NFR BLD AUTO: 2 % (ref 0–0.5)
INR PPP: 1 (ref 0.9–1.1)
LYMPHOCYTES # BLD: 1.5 K/UL (ref 0.8–3.5)
LYMPHOCYTES NFR BLD: 10 % (ref 12–49)
MCH RBC QN AUTO: 23.4 PG (ref 26–34)
MCHC RBC AUTO-ENTMCNC: 27.6 G/DL (ref 30–36.5)
MCV RBC AUTO: 85 FL (ref 80–99)
MONOCYTES # BLD: 1 K/UL (ref 0–1)
MONOCYTES NFR BLD: 7 % (ref 5–13)
NEUTS SEG # BLD: 11.9 K/UL (ref 1.8–8)
NEUTS SEG NFR BLD: 80 % (ref 32–75)
NRBC # BLD: 0.02 K/UL (ref 0–0.01)
NRBC BLD-RTO: 0.1 PER 100 WBC
PLATELET # BLD AUTO: 512 K/UL (ref 150–400)
PMV BLD AUTO: 9.1 FL (ref 8.9–12.9)
POTASSIUM SERPL-SCNC: 5.3 MMOL/L (ref 3.5–5.1)
PROT SERPL-MCNC: 6.8 G/DL (ref 6.4–8.2)
PROTHROMBIN TIME: 9.7 SEC (ref 9–11.1)
RBC # BLD AUTO: 3.67 M/UL (ref 3.8–5.2)
RBC MORPH BLD: ABNORMAL
SODIUM SERPL-SCNC: 143 MMOL/L (ref 136–145)
SPECIMEN EXP DATE BLD: NORMAL
THERAPEUTIC RANGE,PTTT: NORMAL SECS (ref 58–77)
WBC # BLD AUTO: 14.8 K/UL (ref 3.6–11)

## 2019-07-30 PROCEDURE — 36415 COLL VENOUS BLD VENIPUNCTURE: CPT

## 2019-07-30 PROCEDURE — 85025 COMPLETE CBC W/AUTO DIFF WBC: CPT

## 2019-07-30 PROCEDURE — 80053 COMPREHEN METABOLIC PANEL: CPT

## 2019-07-30 PROCEDURE — 85610 PROTHROMBIN TIME: CPT

## 2019-07-30 PROCEDURE — 85730 THROMBOPLASTIN TIME PARTIAL: CPT

## 2019-07-30 PROCEDURE — 86900 BLOOD TYPING SEROLOGIC ABO: CPT

## 2019-07-30 RX ORDER — GLIPIZIDE 10 MG/1
10 TABLET, FILM COATED, EXTENDED RELEASE ORAL DAILY
COMMUNITY
End: 2020-01-01

## 2019-07-30 RX ORDER — METOPROLOL TARTRATE 25 MG/1
25 TABLET, FILM COATED ORAL
COMMUNITY
End: 2020-01-01

## 2019-07-30 NOTE — LETTER
7/30/19 Patient: Aisha Carvalho YOB: 1945 Date of Visit: 7/30/2019 Lillie Camacho MD 
[address] [place] [address] [id number] VIA Facsimile: [phone] Dear Lillie Camacho MD, Thank you for referring Ms. Sonia Brown to [facility] for evaluation. My notes for this consultation are attached. If you have questions, please do not hesitate to call me. I look forward to following your patient along with you. Sincerely, Danette Oliver MD

## 2019-07-30 NOTE — PROGRESS NOTES
Chief Complaint   Patient presents with    Pre-op Exam     Patient presents today for pre-op exam for surgery on this Thursday - Left Femoral Endarterectomy - Dr Corbin Abraham     Visit Vitals  /58 (BP 1 Location: Left arm, BP Patient Position: Sitting)   Pulse 98   Resp 16   Ht 5' 6\" (1.676 m)   Wt 234 lb (106.1 kg)   SpO2 98%   BMI 37.77 kg/m²     Chest pain denied  SOB - with activity  Dizziness denied  Swelling/Edema - lower extremities  Recent hospital visit - [facility] July 13th - 15th for Syncope

## 2019-07-30 NOTE — PERIOP NOTES
Called Julian Hollis @ [facility] office @ this time to discuss anticoagulants. Dr. Montana Jenkins states that pt should hold both eliquis & plavix beginning today until after her surgery @ which time he will re-order. Pt verbalizes understanding. Last dose date/time entered on PTA Med Rec.

## 2019-07-30 NOTE — PERIOP NOTES
[address], [address]    [phone]   Surgery Date:   Thursday, 8/1/19    [facility] staff will call you between 3 and 7pm the day before your surgery with your arrival time. Call [phone] after 7pm Wednesday if you did not receive this call. INSTRUCTIONS BEFORE YOUR SURGERY   When You  Arrive Arrive at the 2nd [address] [facility] on the day of your surgery  Have your insurance card, photo ID, and any copayment (if needed)   Food   and   Drink NO food or drink after midnight the night before surgery    This means NO water, gum, mints, coffee, juice, etc.  No alcohol (beer, wine, liquor) 24 hours before and after surgery   Medications to   TAKE   Morning of Surgery MEDICATIONS TO TAKE THE MORNING OF SURGERY WITH A SIP OF WATER:    Metoprolol if your systolic is >732   Protonix, omeprazole, duloxetine, montelukast, prednisone   You may use your inhalers & nasal spray as usual   Medications  To  STOP      7 days before surgery  Non-Steroidal anti-inflammatory Drugs (NSAID's): for example, Ibuprofen (Advil, Motrin), Naproxen (Aleve)   Aspirin, if taking for pain    Herbal supplements, vitamins, and fish oil    (Tylenol may be taken)   Blood  Thinners  Per Dr. Benedict Mills instructions hold eliquis & plavix until after your surgery. He will re-order it while you are in the hospital.   [address] [place] [address] If you shower the morning of surgery, please do not apply anything to your skin (lotions, powders, deodorant, or makeup, especially mascara)   Do not shave or trim anywhere 24 hours before surgery   Wear loose, comfortable clothes   Leave money, valuables, and jewelry, including body piercings, at home   Spending the Night Your doctor is keeping you in the hospital after surgery, leave personal belongings/luggage in your car until you have a hospital room number.     Hospital discharge time is 12 noon  Drivers must be here before 12 noon unless you are told differently   Special Instructions No diabetic medication the morning of surgery. If you become ill before your surgery, please call Dr. Luis Painting office. If a situation occurs and you are delayed the day of surgery, call [phone]. The patient was contacted  in person. Home medication reviewed and verified during PAT appointment. The patient verbalizes understanding of all instructions and does not  need reinforcement.

## 2019-07-30 NOTE — PERIOP NOTES
Discussed pt w/Inf. Control. Since pt wound cultured MRSA on 5/12/19, she will need to remain on isolation even though 6/12/19 nares cultured negative. Called pt @ this time to explain the rationale of keeping her on contact while in the hospital. She verbalizes understanding.

## 2019-07-31 ENCOUNTER — ANESTHESIA EVENT (OUTPATIENT)
Dept: SURGERY | Age: 74
DRG: 254 | End: 2019-07-31
Payer: MEDICARE

## 2019-07-31 NOTE — PERIOP NOTES
Patient with history of MRSA in 5/2019. Order for contact isolation placed in [facility] under sign and held, multi-phase orders for the day of surgery. DOS: 8/1/2019

## 2019-08-01 ENCOUNTER — ANESTHESIA (OUTPATIENT)
Dept: SURGERY | Age: 74
DRG: 254 | End: 2019-08-01
Payer: MEDICARE

## 2019-08-01 ENCOUNTER — HOSPITAL ENCOUNTER (INPATIENT)
Age: 74
LOS: 1 days | Discharge: HOME HEALTH | DRG: 254 | End: 2019-08-02
Payer: MEDICARE

## 2019-08-01 DIAGNOSIS — I73.9 PAD (PERIPHERAL ARTERY DISEASE) (HCC): Primary | ICD-10-CM

## 2019-08-01 LAB
ANION GAP BLD CALC-SCNC: 14 MMOL/L (ref 10–20)
BUN BLD-MCNC: 22 MG/DL (ref 9–20)
CA-I BLD-MCNC: 1.14 MMOL/L (ref 1.12–1.32)
CHLORIDE BLD-SCNC: 109 MMOL/L (ref 98–107)
CO2 BLD-SCNC: 23 MMOL/L (ref 21–32)
CREAT BLD-MCNC: 1.1 MG/DL (ref 0.6–1.3)
GLUCOSE BLD STRIP.AUTO-MCNC: 105 MG/DL (ref 65–100)
GLUCOSE BLD STRIP.AUTO-MCNC: 127 MG/DL (ref 65–100)
GLUCOSE BLD STRIP.AUTO-MCNC: 138 MG/DL (ref 65–100)
GLUCOSE BLD STRIP.AUTO-MCNC: 212 MG/DL (ref 65–100)
GLUCOSE BLD-MCNC: 107 MG/DL (ref 65–100)
HCT VFR BLD CALC: 26 % (ref 35–47)
POTASSIUM BLD-SCNC: 5.2 MMOL/L (ref 3.5–5.1)
SERVICE CMNT-IMP: ABNORMAL
SODIUM BLD-SCNC: 139 MMOL/L (ref 136–145)

## 2019-08-01 PROCEDURE — 74011250636 HC RX REV CODE- 250/636: Performed by: ANESTHESIOLOGY

## 2019-08-01 PROCEDURE — 77030020782 HC GWN BAIR PAWS FLX 3M -B

## 2019-08-01 PROCEDURE — 76210000016 HC OR PH I REC 1 TO 1.5 HR

## 2019-08-01 PROCEDURE — 76060000036 HC ANESTHESIA 2.5 TO 3 HR

## 2019-08-01 PROCEDURE — 76010000172 HC OR TIME 2.5 TO 3 HR INTENSV-TIER 1

## 2019-08-01 PROCEDURE — 80047 BASIC METABLC PNL IONIZED CA: CPT

## 2019-08-01 PROCEDURE — 65610000006 HC RM INTENSIVE CARE

## 2019-08-01 PROCEDURE — C1768 GRAFT, VASCULAR: HCPCS

## 2019-08-01 PROCEDURE — 94640 AIRWAY INHALATION TREATMENT: CPT

## 2019-08-01 PROCEDURE — 77030039266 HC ADH SKN EXOFIN S2SG -A

## 2019-08-01 PROCEDURE — 77030011256 HC DRSG MEPILEX <16IN NO BORD MOLN -A

## 2019-08-01 PROCEDURE — 77030034850

## 2019-08-01 PROCEDURE — 77030011640 HC PAD GRND REM COVD -A

## 2019-08-01 PROCEDURE — C1757 CATH, THROMBECTOMY/EMBOLECT: HCPCS

## 2019-08-01 PROCEDURE — 82962 GLUCOSE BLOOD TEST: CPT

## 2019-08-01 PROCEDURE — 74011250637 HC RX REV CODE- 250/637

## 2019-08-01 PROCEDURE — 77030008684 HC TU ET CUF COVD -B: Performed by: ANESTHESIOLOGY

## 2019-08-01 PROCEDURE — 77030002986 HC SUT PROL J&J -A

## 2019-08-01 PROCEDURE — 74011636637 HC RX REV CODE- 636/637

## 2019-08-01 PROCEDURE — 74011250636 HC RX REV CODE- 250/636

## 2019-08-01 PROCEDURE — 77030040361 HC SLV COMPR DVT MDII -B

## 2019-08-01 PROCEDURE — 77030026438 HC STYL ET INTUB CARD -A: Performed by: ANESTHESIOLOGY

## 2019-08-01 PROCEDURE — 77030031139 HC SUT VCRL2 J&J -A

## 2019-08-01 PROCEDURE — 77010033678 HC OXYGEN DAILY

## 2019-08-01 PROCEDURE — 74011250636 HC RX REV CODE- 250/636: Performed by: NURSE ANESTHETIST, CERTIFIED REGISTERED

## 2019-08-01 PROCEDURE — 77030014647 HC SEAL FBRN TISSL BAXT -D

## 2019-08-01 PROCEDURE — 74011000250 HC RX REV CODE- 250: Performed by: NURSE ANESTHETIST, CERTIFIED REGISTERED

## 2019-08-01 PROCEDURE — 77030020256 HC SOL INJ NACL 0.9%  500ML

## 2019-08-01 PROCEDURE — 74011000250 HC RX REV CODE- 250

## 2019-08-01 PROCEDURE — 77030002933 HC SUT MCRYL J&J -A

## 2019-08-01 DEVICE — HEMASHIELD PLATINUM FINESSE ULTRA-THIN KNITTED CARDIOVASCULAR PATCH
Type: IMPLANTABLE DEVICE | Site: ARTERIAL | Status: FUNCTIONAL
Brand: HEMASHIELD

## 2019-08-01 RX ORDER — CEFAZOLIN SODIUM/WATER 2 G/20 ML
2 SYRINGE (ML) INTRAVENOUS ONCE
Status: COMPLETED | OUTPATIENT
Start: 2019-08-01 | End: 2019-08-01

## 2019-08-01 RX ORDER — DIPHENHYDRAMINE HYDROCHLORIDE 50 MG/ML
12.5 INJECTION, SOLUTION INTRAMUSCULAR; INTRAVENOUS AS NEEDED
Status: DISCONTINUED | OUTPATIENT
Start: 2019-08-01 | End: 2019-08-01 | Stop reason: HOSPADM

## 2019-08-01 RX ORDER — ROCURONIUM BROMIDE 10 MG/ML
INJECTION, SOLUTION INTRAVENOUS AS NEEDED
Status: DISCONTINUED | OUTPATIENT
Start: 2019-08-01 | End: 2019-08-01 | Stop reason: HOSPADM

## 2019-08-01 RX ORDER — HYDROMORPHONE HYDROCHLORIDE 1 MG/ML
0.5 INJECTION, SOLUTION INTRAMUSCULAR; INTRAVENOUS; SUBCUTANEOUS
Status: DISCONTINUED | OUTPATIENT
Start: 2019-08-01 | End: 2019-08-01 | Stop reason: HOSPADM

## 2019-08-01 RX ORDER — LIDOCAINE HYDROCHLORIDE 10 MG/ML
0.1 INJECTION, SOLUTION EPIDURAL; INFILTRATION; INTRACAUDAL; PERINEURAL AS NEEDED
Status: DISCONTINUED | OUTPATIENT
Start: 2019-08-01 | End: 2019-08-01 | Stop reason: HOSPADM

## 2019-08-01 RX ORDER — LIDOCAINE HYDROCHLORIDE 20 MG/ML
INJECTION, SOLUTION EPIDURAL; INFILTRATION; INTRACAUDAL; PERINEURAL AS NEEDED
Status: DISCONTINUED | OUTPATIENT
Start: 2019-08-01 | End: 2019-08-01 | Stop reason: HOSPADM

## 2019-08-01 RX ORDER — GLIPIZIDE 2.5 MG/1
2.5 TABLET, EXTENDED RELEASE ORAL
Status: DISCONTINUED | OUTPATIENT
Start: 2019-08-01 | End: 2019-08-02 | Stop reason: HOSPADM

## 2019-08-01 RX ORDER — MONTELUKAST SODIUM 10 MG/1
10 TABLET ORAL DAILY
Status: DISCONTINUED | OUTPATIENT
Start: 2019-08-02 | End: 2019-08-02 | Stop reason: HOSPADM

## 2019-08-01 RX ORDER — SODIUM CHLORIDE, SODIUM LACTATE, POTASSIUM CHLORIDE, CALCIUM CHLORIDE 600; 310; 30; 20 MG/100ML; MG/100ML; MG/100ML; MG/100ML
150 INJECTION, SOLUTION INTRAVENOUS CONTINUOUS
Status: DISCONTINUED | OUTPATIENT
Start: 2019-08-01 | End: 2019-08-01 | Stop reason: HOSPADM

## 2019-08-01 RX ORDER — IPRATROPIUM BROMIDE 0.5 MG/2.5ML
SOLUTION RESPIRATORY (INHALATION)
Status: DISPENSED
Start: 2019-08-01 | End: 2019-08-02

## 2019-08-01 RX ORDER — ONDANSETRON 2 MG/ML
4 INJECTION INTRAMUSCULAR; INTRAVENOUS AS NEEDED
Status: DISCONTINUED | OUTPATIENT
Start: 2019-08-01 | End: 2019-08-01 | Stop reason: HOSPADM

## 2019-08-01 RX ORDER — CLOPIDOGREL BISULFATE 75 MG/1
75 TABLET ORAL EVERY EVENING
Status: DISCONTINUED | OUTPATIENT
Start: 2019-08-01 | End: 2019-08-02 | Stop reason: HOSPADM

## 2019-08-01 RX ORDER — SODIUM CHLORIDE 0.9 % (FLUSH) 0.9 %
5-40 SYRINGE (ML) INJECTION EVERY 8 HOURS
Status: DISCONTINUED | OUTPATIENT
Start: 2019-08-01 | End: 2019-08-02 | Stop reason: HOSPADM

## 2019-08-01 RX ORDER — HYDROCODONE BITARTRATE AND ACETAMINOPHEN 5; 325 MG/1; MG/1
1 TABLET ORAL
Status: DISCONTINUED | OUTPATIENT
Start: 2019-08-01 | End: 2019-08-02 | Stop reason: HOSPADM

## 2019-08-01 RX ORDER — SODIUM CHLORIDE 0.9 % (FLUSH) 0.9 %
5-40 SYRINGE (ML) INJECTION AS NEEDED
Status: DISCONTINUED | OUTPATIENT
Start: 2019-08-01 | End: 2019-08-02 | Stop reason: HOSPADM

## 2019-08-01 RX ORDER — PANTOPRAZOLE SODIUM 40 MG/1
40 TABLET, DELAYED RELEASE ORAL
Status: DISCONTINUED | OUTPATIENT
Start: 2019-08-01 | End: 2019-08-02 | Stop reason: HOSPADM

## 2019-08-01 RX ORDER — PREDNISONE 10 MG/1
10 TABLET ORAL 2 TIMES DAILY WITH MEALS
Status: DISCONTINUED | OUTPATIENT
Start: 2019-08-01 | End: 2019-08-02 | Stop reason: HOSPADM

## 2019-08-01 RX ORDER — IPRATROPIUM BROMIDE 0.5 MG/2.5ML
0.5 SOLUTION RESPIRATORY (INHALATION)
Status: DISCONTINUED | OUTPATIENT
Start: 2019-08-01 | End: 2019-08-02 | Stop reason: HOSPADM

## 2019-08-01 RX ORDER — PROMETHAZINE HYDROCHLORIDE 25 MG/1
25 TABLET ORAL
Status: DISCONTINUED | OUTPATIENT
Start: 2019-08-01 | End: 2019-08-02 | Stop reason: HOSPADM

## 2019-08-01 RX ORDER — MELATONIN
2000 DAILY
Status: DISCONTINUED | OUTPATIENT
Start: 2019-08-02 | End: 2019-08-02 | Stop reason: HOSPADM

## 2019-08-01 RX ORDER — MIRTAZAPINE 15 MG/1
15 TABLET, FILM COATED ORAL
Status: DISCONTINUED | OUTPATIENT
Start: 2019-08-01 | End: 2019-08-02 | Stop reason: HOSPADM

## 2019-08-01 RX ORDER — ONDANSETRON 2 MG/ML
INJECTION INTRAMUSCULAR; INTRAVENOUS AS NEEDED
Status: DISCONTINUED | OUTPATIENT
Start: 2019-08-01 | End: 2019-08-01 | Stop reason: HOSPADM

## 2019-08-01 RX ORDER — HYDROMORPHONE HYDROCHLORIDE 2 MG/ML
1 INJECTION, SOLUTION INTRAMUSCULAR; INTRAVENOUS; SUBCUTANEOUS
Status: DISCONTINUED | OUTPATIENT
Start: 2019-08-01 | End: 2019-08-02 | Stop reason: HOSPADM

## 2019-08-01 RX ORDER — ALENDRONATE SODIUM 10 MG/1
40 TABLET ORAL
Status: DISCONTINUED | OUTPATIENT
Start: 2019-08-02 | End: 2019-08-02

## 2019-08-01 RX ORDER — PROPOFOL 10 MG/ML
INJECTION, EMULSION INTRAVENOUS AS NEEDED
Status: DISCONTINUED | OUTPATIENT
Start: 2019-08-01 | End: 2019-08-01 | Stop reason: HOSPADM

## 2019-08-01 RX ORDER — PHENOL/SODIUM PHENOLATE
20 AEROSOL, SPRAY (ML) MUCOUS MEMBRANE 2 TIMES DAILY
Status: DISCONTINUED | OUTPATIENT
Start: 2019-08-01 | End: 2019-08-01 | Stop reason: CLARIF

## 2019-08-01 RX ORDER — FENTANYL CITRATE 50 UG/ML
INJECTION, SOLUTION INTRAMUSCULAR; INTRAVENOUS AS NEEDED
Status: DISCONTINUED | OUTPATIENT
Start: 2019-08-01 | End: 2019-08-01 | Stop reason: HOSPADM

## 2019-08-01 RX ORDER — LANOLIN ALCOHOL/MO/W.PET/CERES
400 CREAM (GRAM) TOPICAL
Status: DISCONTINUED | OUTPATIENT
Start: 2019-08-01 | End: 2019-08-02 | Stop reason: HOSPADM

## 2019-08-01 RX ORDER — POTASSIUM CHLORIDE 750 MG/1
10 TABLET, FILM COATED, EXTENDED RELEASE ORAL 2 TIMES DAILY
Status: DISCONTINUED | OUTPATIENT
Start: 2019-08-01 | End: 2019-08-02 | Stop reason: HOSPADM

## 2019-08-01 RX ORDER — HYDROCORTISONE SODIUM SUCCINATE 100 MG/2ML
INJECTION, POWDER, FOR SOLUTION INTRAMUSCULAR; INTRAVENOUS AS NEEDED
Status: DISCONTINUED | OUTPATIENT
Start: 2019-08-01 | End: 2019-08-01 | Stop reason: HOSPADM

## 2019-08-01 RX ORDER — METOPROLOL TARTRATE 25 MG/1
25 TABLET, FILM COATED ORAL
Status: DISCONTINUED | OUTPATIENT
Start: 2019-08-01 | End: 2019-08-02 | Stop reason: HOSPADM

## 2019-08-01 RX ORDER — DULOXETIN HYDROCHLORIDE 30 MG/1
60 CAPSULE, DELAYED RELEASE ORAL DAILY
Status: DISCONTINUED | OUTPATIENT
Start: 2019-08-02 | End: 2019-08-02 | Stop reason: HOSPADM

## 2019-08-01 RX ORDER — ALBUTEROL SULFATE 0.83 MG/ML
2.5 SOLUTION RESPIRATORY (INHALATION)
Status: DISCONTINUED | OUTPATIENT
Start: 2019-08-01 | End: 2019-08-02 | Stop reason: HOSPADM

## 2019-08-01 RX ORDER — HEPARIN SODIUM 1000 [USP'U]/ML
INJECTION, SOLUTION INTRAVENOUS; SUBCUTANEOUS AS NEEDED
Status: DISCONTINUED | OUTPATIENT
Start: 2019-08-01 | End: 2019-08-01 | Stop reason: HOSPADM

## 2019-08-01 RX ORDER — SODIUM CHLORIDE 9 MG/ML
50 INJECTION, SOLUTION INTRAVENOUS CONTINUOUS
Status: DISCONTINUED | OUTPATIENT
Start: 2019-08-01 | End: 2019-08-02

## 2019-08-01 RX ORDER — SODIUM CHLORIDE, SODIUM LACTATE, POTASSIUM CHLORIDE, CALCIUM CHLORIDE 600; 310; 30; 20 MG/100ML; MG/100ML; MG/100ML; MG/100ML
125 INJECTION, SOLUTION INTRAVENOUS CONTINUOUS
Status: DISCONTINUED | OUTPATIENT
Start: 2019-08-01 | End: 2019-08-01 | Stop reason: HOSPADM

## 2019-08-01 RX ADMIN — ONDANSETRON 4 MG: 2 INJECTION INTRAMUSCULAR; INTRAVENOUS at 13:48

## 2019-08-01 RX ADMIN — Medication 10 ML: at 22:17

## 2019-08-01 RX ADMIN — FENTANYL CITRATE 50 MCG: 50 INJECTION, SOLUTION INTRAMUSCULAR; INTRAVENOUS at 11:47

## 2019-08-01 RX ADMIN — PREDNISONE 10 MG: 10 TABLET ORAL at 18:00

## 2019-08-01 RX ADMIN — LIDOCAINE HYDROCHLORIDE 100 MG: 20 INJECTION, SOLUTION INTRAVENOUS at 14:02

## 2019-08-01 RX ADMIN — Medication 2 G: at 11:47

## 2019-08-01 RX ADMIN — FENTANYL CITRATE 50 MCG: 50 INJECTION, SOLUTION INTRAMUSCULAR; INTRAVENOUS at 12:55

## 2019-08-01 RX ADMIN — HYDROCORTISONE SODIUM SUCCINATE 100 MG: 100 INJECTION, POWDER, FOR SOLUTION INTRAMUSCULAR; INTRAVENOUS at 11:47

## 2019-08-01 RX ADMIN — Medication 10 ML: at 18:05

## 2019-08-01 RX ADMIN — SODIUM CHLORIDE, SODIUM LACTATE, POTASSIUM CHLORIDE, AND CALCIUM CHLORIDE 150 ML/HR: 600; 310; 30; 20 INJECTION, SOLUTION INTRAVENOUS at 11:43

## 2019-08-01 RX ADMIN — FENTANYL CITRATE 50 MCG: 50 INJECTION, SOLUTION INTRAMUSCULAR; INTRAVENOUS at 12:57

## 2019-08-01 RX ADMIN — SODIUM CHLORIDE 50 ML/HR: 900 INJECTION, SOLUTION INTRAVENOUS at 14:40

## 2019-08-01 RX ADMIN — IPRATROPIUM BROMIDE 0.5 MG: 0.5 SOLUTION RESPIRATORY (INHALATION) at 16:22

## 2019-08-01 RX ADMIN — FENTANYL CITRATE 50 MCG: 50 INJECTION, SOLUTION INTRAMUSCULAR; INTRAVENOUS at 13:25

## 2019-08-01 RX ADMIN — MIRTAZAPINE 15 MG: 15 TABLET, FILM COATED ORAL at 20:42

## 2019-08-01 RX ADMIN — HYDROMORPHONE HYDROCHLORIDE 0.5 MG: 1 INJECTION, SOLUTION INTRAMUSCULAR; INTRAVENOUS; SUBCUTANEOUS at 14:57

## 2019-08-01 RX ADMIN — SODIUM CHLORIDE, SODIUM LACTATE, POTASSIUM CHLORIDE, AND CALCIUM CHLORIDE: 600; 310; 30; 20 INJECTION, SOLUTION INTRAVENOUS at 13:17

## 2019-08-01 RX ADMIN — FENTANYL CITRATE 50 MCG: 50 INJECTION, SOLUTION INTRAMUSCULAR; INTRAVENOUS at 11:56

## 2019-08-01 RX ADMIN — IPRATROPIUM BROMIDE 0.5 MG: 0.5 SOLUTION RESPIRATORY (INHALATION) at 20:12

## 2019-08-01 RX ADMIN — HYDROMORPHONE HYDROCHLORIDE 0.5 MG: 1 INJECTION, SOLUTION INTRAMUSCULAR; INTRAVENOUS; SUBCUTANEOUS at 14:42

## 2019-08-01 RX ADMIN — FENTANYL CITRATE 100 MCG: 50 INJECTION, SOLUTION INTRAMUSCULAR; INTRAVENOUS at 12:02

## 2019-08-01 RX ADMIN — FENTANYL CITRATE 50 MCG: 50 INJECTION, SOLUTION INTRAMUSCULAR; INTRAVENOUS at 11:51

## 2019-08-01 RX ADMIN — PANTOPRAZOLE SODIUM 40 MG: 40 TABLET, DELAYED RELEASE ORAL at 18:00

## 2019-08-01 RX ADMIN — HEPARIN SODIUM 5000 UNITS: 1000 INJECTION, SOLUTION INTRAVENOUS; SUBCUTANEOUS at 13:14

## 2019-08-01 RX ADMIN — PROPOFOL 100 MG: 10 INJECTION, EMULSION INTRAVENOUS at 11:57

## 2019-08-01 RX ADMIN — GLIPIZIDE 2.5 MG: 2.5 TABLET, FILM COATED, EXTENDED RELEASE ORAL at 20:40

## 2019-08-01 RX ADMIN — FENTANYL CITRATE 25 MCG: 50 INJECTION, SOLUTION INTRAMUSCULAR; INTRAVENOUS at 13:52

## 2019-08-01 RX ADMIN — APIXABAN 5 MG: 5 TABLET, FILM COATED ORAL at 22:16

## 2019-08-01 RX ADMIN — MEPERIDINE HYDROCHLORIDE 12.5 MG: 25 INJECTION INTRAMUSCULAR; INTRAVENOUS; SUBCUTANEOUS at 15:12

## 2019-08-01 RX ADMIN — CLOPIDOGREL 75 MG: 75 TABLET, FILM COATED ORAL at 20:38

## 2019-08-01 RX ADMIN — Medication 400 MG: at 20:42

## 2019-08-01 RX ADMIN — ROCURONIUM BROMIDE 35 MG: 50 INJECTION, SOLUTION INTRAVENOUS at 11:57

## 2019-08-01 RX ADMIN — FENTANYL CITRATE 25 MCG: 50 INJECTION, SOLUTION INTRAMUSCULAR; INTRAVENOUS at 14:11

## 2019-08-01 NOTE — ANESTHESIA POSTPROCEDURE EVALUATION
Procedure(s): LEFT FEMORAL ENDARTERECTOMY. general 
 
Anesthesia Post Evaluation Multimodal analgesia: multimodal analgesia not used between 6 hours prior to anesthesia start to PACU discharge Patient location during evaluation: PACU Patient participation: complete - patient participated Level of consciousness: awake Pain management: adequate Airway patency: patent Anesthetic complications: no 
Cardiovascular status: acceptable, blood pressure returned to baseline and hemodynamically stable Respiratory status: acceptable Hydration status: acceptable Post anesthesia nausea and vomiting:  controlled Vitals Value Taken Time /94 8/1/2019  3:45 PM  
Temp 36.7 °C (98.1 °F) 8/1/2019  3:21 PM  
Pulse 115 8/1/2019  3:49 PM  
Resp 11 8/1/2019  3:49 PM  
SpO2 99 % 8/1/2019  3:49 PM  
Vitals shown include unvalidated device data.

## 2019-08-01 NOTE — PROGRESS NOTES
TRANSFER - IN REPORT: 
 
Verbal report received from Cain MORA RN(name) on Malegill Halo  being received from [place]) for routine progression of care Report consisted of patients Situation, Background, Assessment and  
Recommendations(SBAR). Information from the following report(s) SBAR, Kardex, Procedure Summary, Intake/Output, MAR, Accordion, Recent Results, Med Rec Status and Cardiac Rhythm sinus rhythm to sinus tachy was reviewed with the receiving nurse. Opportunity for questions and clarification was provided. Assessment completed upon patients arrival to unit and care assumed. 4:00 PM - Patient assessed, no complaints of pain or discomfort. Surgical site intact with no drainage. Vital signs stable. 5:00 PM - BG checked, medications given, potassium 10meq held due to basic metabolic panel at 11 am showing potassium level of 5.2. Will assess again with am labs. 6:00 PM - vital signs stable, patient denies pain or discomfort. I discussed with her pain medication available and timing of it. Bedside and Verbal shift change report given to Johan Valle RN (oncoming nurse) by Darrell Yo RN (offgoing nurse). Report included the following information SBAR, Kardex, Procedure Summary, Intake/Output, MAR, Accordion, Recent Results, Med Rec Status and Cardiac Rhythm sinus rhythm to sinus tachy.

## 2019-08-01 NOTE — BRIEF OP NOTE
BRIEF OPERATIVE NOTE Date of Procedure: 8/1/2019 Preoperative Diagnosis: PVD WITH REST PAIN Postoperative Diagnosis: PVD WITH REST PAIN Procedure(s): LEFT FEMORAL ENDARTERECTOMY Surgeon(s) and Role: Ash Mojica MD - Primary Surgical Assistant: 0 Surgical Staff: 
Circ-1: Alice Masterson RN Scrub Tech-1: Oleg Roach Scrub Tech-Relief: Hanna Tree Surg Asst-1: Ana Rosa Horn Event Time In Time Out Incision Start [phone] Incision Close Anesthesia: General  
Estimated Blood Loss: 100 Specimens: * No specimens in log * Findings: 0 Complications: 0 Implants:  
Implant Name Type Inv. Item Serial No.  Lot No. LRB No. Used Action PATCH VASC PLAT FINESSE [id number] --  - [id number]  PATCH VASC PLAT FINESSE [id number] --  [id number] KELSI[employer]E [employer] MAAMY CARDIOVASCLR [id number] Left 1 Implanted

## 2019-08-01 NOTE — PERIOP NOTES
TRANSFER - OUT REPORT: 
 
Verbal report given to Neville. RN(name) on Gil Rogel  being transferred to ICU 14(unit) for routine post - op Report consisted of patients Situation, Background, Assessment and  
Recommendations(SBAR). Information from the following report(s) SBAR, OR Summary, Procedure Summary, Intake/Output and MAR was reviewed with the receiving nurse. Opportunity for questions and clarification was provided. Patient transported with: 
 Monitor O2 @ 2 liters Registered Nurse

## 2019-08-01 NOTE — ANESTHESIA PREPROCEDURE EVALUATION
Anesthetic History No history of anesthetic complications Review of Systems / Medical History Patient summary reviewed and pertinent labs reviewed Pulmonary COPD Sleep apnea: CPAP Shortness of breath Asthma Neuro/Psych Within defined limits Cardiovascular Hypertension Dysrhythmias : atrial fibrillation Past MI (2015), CAD and cardiac stents Exercise tolerance: <4 METS Comments: On BB,No Eliquis Plavix since Tuesday Echo in July 2019 WNL 
  
  
GI/Hepatic/Renal 
  
GERD Renal disease: CRI Hiatal hernia Comments: Renal insufficiency, Cr.  Endo/Other Diabetes Obesity and blood dyscrasia Other Findings Comments: Fibromyalgia Daily prednisone use for temporal arteritis History of recurrent DVT, thrombectomy and finger amp in April Admitted with UTI in June, recovered K+ 5.3 to recheck DOS Physical Exam 
 
Airway Mallampati: IV 
TM Distance: 4 - 6 cm Neck ROM: short neck Mouth opening: Normal 
 
 Cardiovascular Rhythm: regular Rate: normal 
 
 
 
 Dental 
 
Dentition: Lower dentition intact, Upper dentition intact and Caps/crowns Pulmonary Breath sounds clear to auscultation Abdominal 
GI exam deferred Other Findings Anesthetic Plan ASA: 4 Anesthesia type: general 
 
 
 
Post procedure ventilation Induction: Intravenous Anesthetic plan and risks discussed with: Patient CMAC for intubation. Stress dose steroids Discussed possibility of postop ventilation due to patients cardiopulmonary disease. Pt understood

## 2019-08-01 NOTE — PROGRESS NOTES
Physical Therapy Note: 
 
Orders acknowledged, chart reviewed, PT evaluation deferred. Pt POD 0 s/p femoral endarterectomy. Will follow POD 1 for PT evaluation.

## 2019-08-01 NOTE — PROGRESS NOTES
[facility] Pharmacy Note: 08/01/19 This patients herbal medication: Biotin has been discontinued during the hospitalization per hospital policy. If deemed medically necessary, the medication can be reordered by the prescriber. The patient will then be asked to bring in their own supply of medication. Thank you Michael Jerez, PharmD 
[phone]

## 2019-08-02 VITALS
OXYGEN SATURATION: 100 % | DIASTOLIC BLOOD PRESSURE: 74 MMHG | RESPIRATION RATE: 15 BRPM | BODY MASS INDEX: 37.61 KG/M2 | HEART RATE: 136 BPM | HEIGHT: 66 IN | TEMPERATURE: 98.2 F | SYSTOLIC BLOOD PRESSURE: 140 MMHG | WEIGHT: 234 LBS

## 2019-08-02 LAB
BASOPHILS # BLD: 0.1 K/UL (ref 0–0.1)
BASOPHILS NFR BLD: 1 % (ref 0–1)
DIFFERENTIAL METHOD BLD: ABNORMAL
EOSINOPHIL # BLD: 0.1 K/UL (ref 0–0.4)
EOSINOPHIL NFR BLD: 1 % (ref 0–7)
ERYTHROCYTE [DISTWIDTH] IN BLOOD BY AUTOMATED COUNT: 17.2 % (ref 11.5–14.5)
GLUCOSE BLD STRIP.AUTO-MCNC: 58 MG/DL (ref 65–100)
GLUCOSE BLD STRIP.AUTO-MCNC: 59 MG/DL (ref 65–100)
GLUCOSE BLD STRIP.AUTO-MCNC: 73 MG/DL (ref 65–100)
HCT VFR BLD AUTO: 26.9 % (ref 35–47)
HGB BLD-MCNC: 7.5 G/DL (ref 11.5–16)
IMM GRANULOCYTES # BLD AUTO: 0.1 K/UL (ref 0–0.04)
IMM GRANULOCYTES NFR BLD AUTO: 1 % (ref 0–0.5)
LYMPHOCYTES # BLD: 2.1 K/UL (ref 0.8–3.5)
LYMPHOCYTES NFR BLD: 16 % (ref 12–49)
MCH RBC QN AUTO: 24 PG (ref 26–34)
MCHC RBC AUTO-ENTMCNC: 27.9 G/DL (ref 30–36.5)
MCV RBC AUTO: 85.9 FL (ref 80–99)
MONOCYTES # BLD: 1.3 K/UL (ref 0–1)
MONOCYTES NFR BLD: 10 % (ref 5–13)
NEUTS SEG # BLD: 9.6 K/UL (ref 1.8–8)
NEUTS SEG NFR BLD: 71 % (ref 32–75)
NRBC # BLD: 0 K/UL (ref 0–0.01)
NRBC BLD-RTO: 0 PER 100 WBC
PLATELET # BLD AUTO: 380 K/UL (ref 150–400)
PMV BLD AUTO: 9.2 FL (ref 8.9–12.9)
RBC # BLD AUTO: 3.13 M/UL (ref 3.8–5.2)
RBC MORPH BLD: ABNORMAL
SERVICE CMNT-IMP: ABNORMAL
SERVICE CMNT-IMP: ABNORMAL
SERVICE CMNT-IMP: NORMAL
WBC # BLD AUTO: 13.3 K/UL (ref 3.6–11)
WBC MORPH BLD: ABNORMAL

## 2019-08-02 PROCEDURE — 74011250637 HC RX REV CODE- 250/637

## 2019-08-02 PROCEDURE — 36415 COLL VENOUS BLD VENIPUNCTURE: CPT

## 2019-08-02 PROCEDURE — 82962 GLUCOSE BLOOD TEST: CPT

## 2019-08-02 PROCEDURE — 74011636637 HC RX REV CODE- 636/637

## 2019-08-02 PROCEDURE — 94640 AIRWAY INHALATION TREATMENT: CPT

## 2019-08-02 PROCEDURE — 85025 COMPLETE CBC W/AUTO DIFF WBC: CPT

## 2019-08-02 PROCEDURE — 04UL0JZ SUPPLEMENT LEFT FEMORAL ARTERY WITH SYNTHETIC SUBSTITUTE, OPEN APPROACH: ICD-10-PCS

## 2019-08-02 PROCEDURE — 04CL0ZZ EXTIRPATION OF MATTER FROM LEFT FEMORAL ARTERY, OPEN APPROACH: ICD-10-PCS

## 2019-08-02 PROCEDURE — 97116 GAIT TRAINING THERAPY: CPT

## 2019-08-02 PROCEDURE — 97162 PT EVAL MOD COMPLEX 30 MIN: CPT

## 2019-08-02 PROCEDURE — 77010033678 HC OXYGEN DAILY

## 2019-08-02 PROCEDURE — 74011000250 HC RX REV CODE- 250

## 2019-08-02 RX ORDER — HYDROCODONE BITARTRATE AND ACETAMINOPHEN 7.5; 325 MG/1; MG/1
2 TABLET ORAL
Qty: 30 TAB | Refills: 0 | Status: SHIPPED | OUTPATIENT
Start: 2019-08-02 | End: 2019-08-05

## 2019-08-02 RX ADMIN — DULOXETINE HYDROCHLORIDE 60 MG: 30 CAPSULE, DELAYED RELEASE ORAL at 08:47

## 2019-08-02 RX ADMIN — POTASSIUM CHLORIDE 10 MEQ: 750 TABLET, EXTENDED RELEASE ORAL at 08:47

## 2019-08-02 RX ADMIN — HYDROCODONE BITARTRATE AND ACETAMINOPHEN 1 TABLET: 5; 325 TABLET ORAL at 07:45

## 2019-08-02 RX ADMIN — PANTOPRAZOLE SODIUM 40 MG: 40 TABLET, DELAYED RELEASE ORAL at 07:45

## 2019-08-02 RX ADMIN — PREDNISONE 10 MG: 10 TABLET ORAL at 10:07

## 2019-08-02 RX ADMIN — Medication 10 ML: at 05:00

## 2019-08-02 RX ADMIN — VITAMIN D, TAB 1000IU (100/BT) 2000 UNITS: 25 TAB at 08:47

## 2019-08-02 RX ADMIN — SAXAGLIPTIN 5 MG: 5 TABLET, FILM COATED ORAL at 10:07

## 2019-08-02 RX ADMIN — IPRATROPIUM BROMIDE 0.5 MG: 0.5 SOLUTION RESPIRATORY (INHALATION) at 08:19

## 2019-08-02 RX ADMIN — APIXABAN 5 MG: 5 TABLET, FILM COATED ORAL at 10:07

## 2019-08-02 RX ADMIN — MONTELUKAST SODIUM 10 MG: 10 TABLET, FILM COATED ORAL at 08:47

## 2019-08-02 NOTE — PROGRESS NOTES
Bedside shift change report given to Lynnda Olszewski, RN (oncoming nurse) by Alma Jang RN (offgoing nurse).  Report included the following information SBAR, Kardex, Procedure Summary, Recent Results and Cardiac Rhythm SR  
Primary Nurse Bao Ceballos and Alma Jang RN performed a dual skin assessment on this patient Impairment noted- see wound doc flow sheet Darrion score is 21

## 2019-08-02 NOTE — PROGRESS NOTES
PCP DELANEY appt scheduled with Dr. Maximnio Hopson on 8/9/2019 at 11:00am. Appt added to AVS. Lori Frankel, CM Specialist

## 2019-08-02 NOTE — PROGRESS NOTES
Notified Dr. Arnulfo Blake about asymptomatic tachycardia while patient up ambulation with PT. No new instruction with the issue.

## 2019-08-02 NOTE — OP NOTES
[facility] 
OPERATIVE REPORT Name:  Matthias Au 
MR#:  [id number] :  1945 ACCOUNT #:  [de-identified] DATE OF SERVICE:  2019 PREOPERATIVE DIAGNOSIS:  Peripheral vascular disease with rest pain. POSTOPERATIVE DIAGNOSIS:  Peripheral vascular disease with rest pain. PROCEDURE PERFORMED:  Left femoral endarterectomy with patch angioplasty. SURGEON:  Sayra Rubio MD 
 
ASSISTANT:  None. ANESTHESIA:  General anesthesia. COMPLICATIONS:  None. SPECIMENS REMOVED:  None. IMPLANTS:  None. ESTIMATED BLOOD LOSS:  100 mL. INDICATIONS FOR PROCEDURE:  The patient is a 79-year-old female with peripheral vascular disease and a lesion in her left femoral artery. A decision was made to take her to the operating room for rectification. TECHNICAL DETAILS:  The patient was taken to the operating room. Once suitable level of anesthesia was introduced, prepped and draped in typical sterile fashion. Oblique incision was made in the left groin. Dissection was carried out down to the common femoral, superficial femoral, and profunda femoral arteries. The patient was systemically heparinized and the vessel was opened longitudinally. There was only mild disease seen in the area which was removed. proximally and distally with good backbleeding from both the profunda and superficial femoral artery and good inflow. Patch angioplasty was then performed which was secured in place with Prolene suture. Flow was restored in the vessel. There was excellent signal.  Hemostasis was controlled. The wounds were irrigated thoroughly and closed in multiple layers. She tolerated the procedure well. Sponge and instrument counts were correct x2. She was awakened and transferred back to the recovery room in good condition. Ani Brush MD 
 
 
[id number]/ 
D:  2019 9:08 
T:  2019 18:36JOB #:  [id number]

## 2019-08-02 NOTE — PROGRESS NOTES
I discussed pt with PT. Pt has been cleared for discharge Resumption orders for SN,pT,OT sent to [facility] through [facility] and placed on pt.'s AVS.. Pt will be discharged after lunch. Her granddaughter will transport pt home . Dk Mcadams [employer] Medicare letter placed in pt.'s chart to be scanned. Dk Mcadams

## 2019-08-02 NOTE — PROGRESS NOTES
Problem: Mobility Impaired (Adult and Pediatric) Goal: *Acute Goals and Plan of Care (Insert Text) Description Physical Therapy Goals Initiated 8/2/2019 1. Patient will ambulate with modified independence for 250 feet with the least restrictive device within 7 day(s). 2.  Patient will ascend/descend 1 curb without handrail(s) with modified independence within 7 day(s). Outcome: Progressing Towards Goal 
 PHYSICAL THERAPY EVALUATION Patient: Jordon Severino (33 y.o. female) Date: 8/2/2019 Primary Diagnosis: PVD (peripheral vascular disease) ([address] [I73.9] PVD (peripheral vascular disease) ([address] [I73.9] Procedure(s) (LRB): LEFT FEMORAL ENDARTERECTOMY (Left) 1 Day Post-Op Precautions:   Fall ASSESSMENT : 
Based on the objective data described below, the patient presents with decreased endurance and improving LLE sensation on POD 1 s/p L femoral endarterectomy. She reports modified independent baseline ambulation using rollator or rolling walker and lives alone at [address] [place] [address]. She offers good participation and denies pain throughout encounter. Pt requiring supervision/SBA for flat surface mobility initially but improves to modified independence with continued training. HR elevated at 136 with activity, decreases to 120s at rest; BP stable. She appears with increased work of breathing (SpO2 95% with activity using room air) and reports respiratory status is at or near baseline. Patient will benefit from skilled intervention to address the above impairments. Patients rehabilitation potential is considered to be Good Factors which may influence rehabilitation potential include:  
? None noted ? Mental ability/status ? Medical condition ? Home/family situation and support systems ? Safety awareness 
? Pain tolerance/management 
? Other: PLAN : 
Recommendations and Planned Interventions: ?           Bed Mobility Training             ? Neuromuscular Re-Education ? Transfer Training                   ? Orthotic/Prosthetic Training 
? Gait Training                         ? Modalities ? Therapeutic Exercises           ? Edema Management/Control ? Therapeutic Activities            ? Patient and Family Training/Education ? Other (comment): Frequency/Duration: Patient will be followed by physical therapy  5 times a week to address goals. Discharge Recommendations: None Further Equipment Recommendations for Discharge: none SUBJECTIVE:  
Patient stated I have a calf on the left leg!  Pt received supine, agreeable to PT and cleared by RN. OBJECTIVE DATA SUMMARY:  
HISTORY:   
Past Medical History:  
Diagnosis Date Asthma Atrial fibrillation ([address] 11/16/2018 Autoimmune disease ([facility]   
 fibromyalgia CAD (coronary artery disease) 10/9/2015 Closed wedge compression fracture of T7 vertebra ([facility] 11/13/2018 Diabetes ([facility] DVT (deep vein thrombosis) in pregnancy ([facility] GERD (gastroesophageal reflux disease) Heart failure ([facility] HTN (hypertension) NSTEMI (non-ST elevated myocardial infarction) ([facility] 10/9/2015 Obesity (BMI 30-39.9) 11/13/2018 Sleep apnea   
 wears CPAP Past Surgical History:  
Procedure Laterality Date ABDOMEN SURGERY PROC UNLISTED    
 hital hernia repair, gall bladder removed AMPUTATION TRANSMETACARPAL Right 06/12/2019  
 entire ring finger, 2nd & 3rd fingers (transmetacarpal) BREAST SURGERY PROCEDURE UNLISTED    
 lumpectomy CARDIAC SURG PROCEDURE UNLIST  10/9/15  
 2 stents [facility] HX COLOSTOMY    
 and reversal, post episiotomy repair [facility] UROLOGICAL  2009  
 bladder sling Prior Level of Function/Home Situation: mod I ambulation using rollator or RW, drives, +fall history. States 24-hour assistance is available if necessary. Personal factors and/or comorbidities impacting plan of care: as above Home Situation Home Environment: Private residence # Steps to Enter: 0 One/Two Story Residence: One story Living Alone: Yes Support Systems: Family member(s), Home care staff Patient Expects to be Discharged to[de-identified] Private residence EXAMINATION/PRESENTATION/DECISION MAKING:  
Critical Behavior: 
  
 Pt alert, oriented x 4 with appropriate insight. Hearing: Auditory Auditory Impairment: None Skin:  discoloration throughout BLEs. Edema: 1+ BLEs Range Of Motion: 
  
 Generally decreased, functional LEs Strength:   
  
 Generally decreased, functional LEs; B ankle dorsiflexion 5/5 Tone & Sensation:  
  
  
  
 Impaired light touch sensation LLE. Normal LE tone throughout. Coordination: WFL Functional Mobility: 
Bed Mobility: 
  
Supine to Sit: Additional time;Modified independent Sit to Supine: Additional time;Modified independent Scooting: Modified independent Transfers: 
Sit to Stand: Supervision;Modified independent Stand to Sit: Supervision;Modified independent Balance:  
Sitting: Without support Sitting - Static: Good (unsupported) Sitting - Dynamic: Good (unsupported) Standing: With support Standing - Static: Good Standing - Dynamic : Good Ambulation/Gait Training: 
Distance (ft): (40' x 2 + 30' ) Assistive Device: Walker, rolling;Gait belt Ambulation - Level of Assistance: Stand-by assistance;Modified independent Gait Abnormalities: Decreased step clearance Speed/Es: Pace decreased (<100 feet/min) Appropriate device techniques, no loss of balance, appropriate obstacle negotiation. Functional Measure: 
Barthel Index: 
 
Bathin Bladder: 10 Bowels: 10 
Groomin Dressin Feeding: 10 Mobility: 0 Stairs: 0 Toilet Use: 10 Transfer (Bed to Chair and Back): 15 Total: 65/100 Percentage of impairment  
0% 1-19% 20-39% 40-59% 60-79% 80-99% 100% Barthel Score 0-100 100 99-80 79-60 59-40 20-39 1-19 
 0 The Barthel ADL Index: Guidelines 1. The index should be used as a record of what a patient does, not as a record of what a patient could do. 2. The main aim is to establish degree of independence from any help, physical or verbal, however minor and for whatever reason. 3. The need for supervision renders the patient not independent. 4. A patient's performance should be established using the best available evidence. Asking the patient, friends/relatives and nurses are the usual sources, but direct observation and common sense are also important. However direct testing is not needed. 5. Usually the patient's performance over the preceding 24-48 hours is important, but occasionally longer periods will be relevant. 6. Middle categories imply that the patient supplies over 50 per cent of the effort. 7. Use of aids to be independent is allowed. Siobhan Lee., Barthel, D.W. ([id number]). Functional evaluation: the Barthel Index. [facility] (14)2. DAMI Rubin, Sravan Brizuela., Sheila Milner., [place], [address] [place] [address] (1999). Measuring the change indisability after inpatient rehabilitation; comparison of the responsiveness of the Barthel Index and Functional [place] Measure. Journal of Neurology, Neurosurgery, and Psychiatry, 66(4), 884-232. Mae Boothe, N.J.A, SANIYA Parry, & Robbie Mclain, MJosiahA. (2004.) Assessment of post-stroke quality of life in cost-effectiveness studies: The usefulness of the Barthel Index and the EuroQoL-5D. [facility], 13, 038-02 Physical Therapy Evaluation Charge Determination History Examination Presentation Decision-Making HIGH Complexity :3+ comorbidities / personal factors will impact the outcome/ POC  HIGH Complexity : 4+ Standardized tests and measures addressing body structure, function, activity limitation and / or participation in recreation  MEDIUM Complexity : Evolving with changing characteristics  Other outcome measures barthel  MEDIUM Based on the above components, the patient evaluation is determined to be of the following complexity level: MEDIUM Pain: 
Pain Scale 1: Numeric (0 - 10) Pain Intensity 1: 0 Pain Location 1: Incisional;Groin Pain Orientation 1: Left Pain Description 1: Sore Pain Intervention(s) 1: Medication (see MAR) Activity Tolerance:  
Limited by elevated HR, increased work of breathing (states baseline), fatigue. BP and SpO2 stable using room air. Please refer to the flowsheet for vital signs taken during this treatment. After treatment:  
?         Patient left in no apparent distress sitting up in chair ? Patient left in no apparent distress in bed 
? Call bell left within reach ? Nursing notified ? Caregiver present ? Bed alarm activated COMMUNICATION/EDUCATION:  
The patients plan of care was discussed with: Registered Nurse, care manager. ?         Fall prevention education was provided and the patient/caregiver indicated understanding. ? Patient/family have participated as able in goal setting and plan of care. ?         Patient/family agree to work toward stated goals and plan of care. ?         Patient understands intent and goals of therapy, but is neutral about his/her participation. ? Patient is unable to participate in goal setting and plan of care. Thank you for this referral. 
Kranthi Leavitt, PT, DPT Time Calculation: 27 mins

## 2019-08-02 NOTE — PROGRESS NOTES
Reason for Admission:   PVD with rest pain,left femoral endarderectomy RRAT Score:     45 Resources/supports as identified by patient/family:   Pt has supportive daughter and granddaughter Top Challenges facing patient (as identified by patient/family and CM): Finances/Medication cost?     Pt has [employer] as her secondary insurance Transportation? Pt states that either her daughter or granddaughter will transport pt home when discharged. Support system or lack thereof? Supportive family plus pt lives in independent living @ [facility] Living arrangements? Lives alone in independent living Self-care/ADLs/Cognition? Typically pt care for herself. She will use a cane @ times for ambulation,Pt is cognitively intact. Current Advanced Directive/Advance Care Plan:  Full code,no AMD on file Plan for utilizing home health:    I contacted PT department to please assess pt early as pt will likely be discharged home today. If home health is recommended,pt would like [facility] as she has had [facility] in the past. 
 
Pt states she has been in SNF x2 @ [facility] but does not anticipate SNF needs @ this juncture. Transition of Care Plan:               
I met with pt as an introductory visit to discuss discharge needs. Pt confirmed she lives @ [address]. She lives alone in a one level apartment. Pt has access to their swimming pool and gym. She is . Pt has a supportive daughter and granddaughter. Pt states either one will transport her home today if she is discharged. Plan today: 1. PT evaluation. 2.Once PT evaluates pt,I will discuss recommendations with pt.  
 
3.If home PT is prescribed,I will set up home health with [facility] ,pt.'s first choice. 4.I will continue to follow pt for discharge needs. 5.pCP is Dr Isidro Morris who does not have a nurse navigator to contact.  
Omega Meneses

## 2019-08-02 NOTE — DISCHARGE INSTRUCTIONS
Patient Discharge Instructions    Aisha Carvalho / [id number] : 1945    Admitted 2019 Discharged: 2019     Take Home Medications            · It is important that you take the medication exactly as they are prescribed. · Keep your medication in the bottles provided by the pharmacist and keep a list of the medication names, dosages, and times to be taken in your wallet. · Do not take other medications without consulting your doctor. What to do at Home    Recommended diet: Regular Diet,     Recommended activity: Activity as tolerated,      Follow-up with Dr Jesus Pierce in 2 weeks        Information obtained by :  I understand that if any problems occur once I am at home I am to contact my physician. I understand and acknowledge receipt of the instructions indicated above.                                                                                                                                            Physician's or R.N.'s Signature                                                                  Date/Time                                                                                                                                              Patient or Representative Signature                                                          Date/Time

## 2019-08-07 NOTE — DISCHARGE SUMMARY
[facility] DISCHARGE SUMMARY Name:  Timothy Johnson 
MR#:  [id number] :  1945 ACCOUNT #:  [de-identified] ADMIT DATE:  2019 DISCHARGE DATE:  2019 ADMISSION DIAGNOSIS:  Peripheral vascular disease. DISCHARGE DIAGNOSES:  Peripheral vascular disease. PROCEDURE:  Left femoral endarterectomy with patch angioplasty done by me on the date of admission. DETAILS OF ADMISSION AND HOSPITAL COURSE:  The patient is an elderly female with peripheral vascular disease at the left leg. A decision was made to take her to the operating room for femoral endarterectomy. This was done by me on the date of admission without difficulty or complication. Postoperative course was unremarkable. On the date of discharge, she was doing well and sent home in good condition. DISCHARGE DISPOSITION:  Home. MD VIPIN Montiel/[id number]/ 
D:  2019 8:26 T:  2019 21:50 
JOB #:  [id number]

## 2019-08-30 ENCOUNTER — HOSPITAL ENCOUNTER (OUTPATIENT)
Dept: GENERAL RADIOLOGY | Age: 74
Discharge: HOME | End: 2019-08-30
Payer: MEDICARE

## 2019-08-30 ENCOUNTER — HOSPITAL ENCOUNTER (OUTPATIENT)
Dept: PREADMISSION TESTING | Age: 74
Discharge: HOME | End: 2019-08-30
Payer: MEDICARE

## 2019-08-30 VITALS
HEIGHT: 67 IN | WEIGHT: 242 LBS | TEMPERATURE: 98 F | SYSTOLIC BLOOD PRESSURE: 128 MMHG | HEART RATE: 97 BPM | BODY MASS INDEX: 37.98 KG/M2 | DIASTOLIC BLOOD PRESSURE: 76 MMHG | OXYGEN SATURATION: 100 % | RESPIRATION RATE: 20 BRPM

## 2019-08-30 LAB
ALBUMIN SERPL-MCNC: 3.1 G/DL (ref 3.5–5)
ALBUMIN/GLOB SERPL: 0.9 {RATIO} (ref 1.1–2.2)
ALP SERPL-CCNC: 120 U/L (ref 45–117)
ALT SERPL-CCNC: 29 U/L (ref 12–78)
ANION GAP SERPL CALC-SCNC: 9 MMOL/L (ref 5–15)
APTT PPP: 20.6 SEC (ref 22.1–32)
AST SERPL-CCNC: 17 U/L (ref 15–37)
BASOPHILS # BLD: 0 K/UL (ref 0–0.1)
BASOPHILS NFR BLD: 0 % (ref 0–1)
BILIRUB SERPL-MCNC: 0.3 MG/DL (ref 0.2–1)
BUN SERPL-MCNC: 26 MG/DL (ref 6–20)
BUN/CREAT SERPL: 20 (ref 12–20)
CALCIUM SERPL-MCNC: 8.5 MG/DL (ref 8.5–10.1)
CHLORIDE SERPL-SCNC: 108 MMOL/L (ref 97–108)
CO2 SERPL-SCNC: 22 MMOL/L (ref 21–32)
CREAT SERPL-MCNC: 1.33 MG/DL (ref 0.55–1.02)
DIFFERENTIAL METHOD BLD: ABNORMAL
EOSINOPHIL # BLD: 0 K/UL (ref 0–0.4)
EOSINOPHIL NFR BLD: 0 % (ref 0–7)
ERYTHROCYTE [DISTWIDTH] IN BLOOD BY AUTOMATED COUNT: 17.2 % (ref 11.5–14.5)
GLOBULIN SER CALC-MCNC: 3.4 G/DL (ref 2–4)
GLUCOSE SERPL-MCNC: 139 MG/DL (ref 65–100)
HCT VFR BLD AUTO: 26.4 % (ref 35–47)
HGB BLD-MCNC: 7.2 G/DL (ref 11.5–16)
IMM GRANULOCYTES # BLD AUTO: 0.4 K/UL (ref 0–0.04)
IMM GRANULOCYTES NFR BLD AUTO: 2 % (ref 0–0.5)
INR PPP: 1 (ref 0.9–1.1)
LYMPHOCYTES # BLD: 1.1 K/UL (ref 0.8–3.5)
LYMPHOCYTES NFR BLD: 6 % (ref 12–49)
MCH RBC QN AUTO: 21.6 PG (ref 26–34)
MCHC RBC AUTO-ENTMCNC: 27.3 G/DL (ref 30–36.5)
MCV RBC AUTO: 79 FL (ref 80–99)
MONOCYTES # BLD: 1.3 K/UL (ref 0–1)
MONOCYTES NFR BLD: 7 % (ref 5–13)
NEUTS SEG # BLD: 16.1 K/UL (ref 1.8–8)
NEUTS SEG NFR BLD: 85 % (ref 32–75)
NRBC # BLD: 0.09 K/UL (ref 0–0.01)
NRBC BLD-RTO: 0.5 PER 100 WBC
PLATELET # BLD AUTO: 471 K/UL (ref 150–400)
PMV BLD AUTO: 9.1 FL (ref 8.9–12.9)
POTASSIUM SERPL-SCNC: 4.7 MMOL/L (ref 3.5–5.1)
PROT SERPL-MCNC: 6.5 G/DL (ref 6.4–8.2)
PROTHROMBIN TIME: 9.8 SEC (ref 9–11.1)
RBC # BLD AUTO: 3.34 M/UL (ref 3.8–5.2)
RBC MORPH BLD: ABNORMAL
SODIUM SERPL-SCNC: 139 MMOL/L (ref 136–145)
THERAPEUTIC RANGE,PTTT: ABNORMAL SECS (ref 58–77)
WBC # BLD AUTO: 18.9 K/UL (ref 3.6–11)
WBC MORPH BLD: ABNORMAL

## 2019-08-30 PROCEDURE — 86923 COMPATIBILITY TEST ELECTRIC: CPT

## 2019-08-30 PROCEDURE — 36415 COLL VENOUS BLD VENIPUNCTURE: CPT

## 2019-08-30 PROCEDURE — 80053 COMPREHEN METABOLIC PANEL: CPT

## 2019-08-30 PROCEDURE — 86900 BLOOD TYPING SEROLOGIC ABO: CPT

## 2019-08-30 PROCEDURE — 85730 THROMBOPLASTIN TIME PARTIAL: CPT

## 2019-08-30 PROCEDURE — 85025 COMPLETE CBC W/AUTO DIFF WBC: CPT

## 2019-08-30 PROCEDURE — 85610 PROTHROMBIN TIME: CPT

## 2019-08-30 PROCEDURE — 71046 X-RAY EXAM CHEST 2 VIEWS: CPT

## 2019-08-30 RX ORDER — TRAMADOL HYDROCHLORIDE 50 MG/1
50 TABLET ORAL
Status: ON HOLD | COMMUNITY
End: 2020-01-01 | Stop reason: SDUPTHER

## 2019-08-30 NOTE — PERIOP NOTES
Discussed anticoagulant therapy w/Traci @ Romeo's office. She is texting him & will call me back before COB. 1620:  Kalli Patel returned call to state Dr. Nadia Chandra wants pt to stop Plavix & eliquis 2d prior to surgery. Therefore, last dose will be Monday evening, 9/2/19.    1625: Attempt to call pt on both numbers w/o success. Not able to leave VM. Calling granddaughter, who was with her during PAT appt. Left VM. Called pt's daughter & spoke w/her about this plan. She verbalizes understanding, organizes all of her mother's meds & will make sure to take out doses for the 2d prior to surgery & for the morning of surgery.

## 2019-08-30 NOTE — PERIOP NOTES
[address] [place] [address] Dr Eva Hill [address], [address] [facility]   PRE-ADMISSION TESTING    [phone]     Surgery Date:   Thursday, 9/5/19        [facility] staff will call you between 3 and 7pm the day before your surgery with your arrival time. Call [phone] after 7pm Wednesday if you did not receive this call. INSTRUCTIONS BEFORE YOUR SURGERY   When You  Arrive Arrive at the 2nd [address] [facility] on the day of your surgery  Have your insurance card, photo ID, and any copayment (if needed)   Food   and   Drink NO food or drink after midnight the night before surgery    This means NO water, gum, mints, coffee, juice, etc.  No alcohol (beer, wine, liquor) 24 hours before and after surgery   Medications to   TAKE   Morning of Surgery MEDICATIONS TO TAKE THE MORNING OF SURGERY WITH A SIP OF WATER:    Cymbalta, protonix, prilosec, montelukast, prednisone, & metoprolol, if needed for systolic>120    Use your maintenance inhalers in the morning as usual.   Please bring albuterol inhaler. Medications  To  STOP      7 days before surgery  Non-Steroidal anti-inflammatory Drugs (NSAID's): for example, Ibuprofen (Advil, Motrin), Naproxen (Aleve)   Aspirin, if taking for pain    Herbal supplements, vitamins, and fish oil   Other: This includes your vitamin B    (Tylenol may be taken)   Blood  Thinners  I will contact Camelia Jaureguioter & find out about anticoagulant plan & call you.    Bathing Clothing  Jewelry  Valuables       If you shower the morning of surgery, please do not apply anything to your skin (lotions, powders, deodorant, or makeup, especially mascara)   Do not shave or trim anywhere 24 hours before surgery   Wear loose, comfortable clothes   Leave money, valuables, and jewelry, including body piercings, at home   Spending the Night Your doctor is keeping you in the hospital after surgery, leave personal belongings/luggage in your car until you have a hospital room number. Hospital discharge time is 12 noon  Drivers must be here before 12 noon unless you are told differently   Special Instructions If you become ill before your surgery, call Dr. Dorena Jeans. If a situation occurs and you are delayed the day of surgery, call [phone]. The patient was contacted  in person. Home medication reviewed and verified during PAT appointment. The patient verbalizes understanding of all instructions and does not  need reinforcement.

## 2019-09-04 ENCOUNTER — ANESTHESIA EVENT (OUTPATIENT)
Dept: SURGERY | Age: 74
DRG: 253 | End: 2019-09-04
Payer: MEDICARE

## 2019-09-04 NOTE — PERIOP NOTES
Patient was MRSA+ in 5/2019. Orders for contact isolation has been placed in [facility] under sign and held, multi-phase orders for the day of surgery.   DOS: 9/5/2019

## 2019-09-05 ENCOUNTER — ANESTHESIA (OUTPATIENT)
Dept: SURGERY | Age: 74
DRG: 253 | End: 2019-09-05
Payer: MEDICARE

## 2019-09-05 ENCOUNTER — HOSPITAL ENCOUNTER (INPATIENT)
Age: 74
LOS: 4 days | Discharge: NURSING, REHAB OR LONG-TERM CARE | DRG: 253 | End: 2019-09-09
Payer: MEDICARE

## 2019-09-05 PROBLEM — I70.90 ASO (ARTERIOSCLEROSIS OBLITERANS): Status: ACTIVE | Noted: 2019-09-05

## 2019-09-05 LAB
GLUCOSE BLD STRIP.AUTO-MCNC: 116 MG/DL (ref 65–100)
GLUCOSE BLD STRIP.AUTO-MCNC: 121 MG/DL (ref 65–100)
SERVICE CMNT-IMP: ABNORMAL
SERVICE CMNT-IMP: ABNORMAL

## 2019-09-05 PROCEDURE — 77030008467 HC STPLR SKN COVD -B

## 2019-09-05 PROCEDURE — 76210000017 HC OR PH I REC 1.5 TO 2 HR

## 2019-09-05 PROCEDURE — C1768 GRAFT, VASCULAR: HCPCS

## 2019-09-05 PROCEDURE — 74011250636 HC RX REV CODE- 250/636: Performed by: ANESTHESIOLOGY

## 2019-09-05 PROCEDURE — 77030021668 HC NEB PREFIL KT VYRM -A

## 2019-09-05 PROCEDURE — 76060000036 HC ANESTHESIA 2.5 TO 3 HR

## 2019-09-05 PROCEDURE — 82962 GLUCOSE BLOOD TEST: CPT

## 2019-09-05 PROCEDURE — 74011250636 HC RX REV CODE- 250/636

## 2019-09-05 PROCEDURE — 77030018846 HC SOL IRR STRL H20 ICUM -A

## 2019-09-05 PROCEDURE — 77030011640 HC PAD GRND REM COVD -A

## 2019-09-05 PROCEDURE — 65660000000 HC RM CCU STEPDOWN

## 2019-09-05 PROCEDURE — 74011250636 HC RX REV CODE- 250/636: Performed by: NURSE ANESTHETIST, CERTIFIED REGISTERED

## 2019-09-05 PROCEDURE — 77030002924 HC SUT GORTX WLGO -B

## 2019-09-05 PROCEDURE — 74011000250 HC RX REV CODE- 250: Performed by: NURSE ANESTHETIST, CERTIFIED REGISTERED

## 2019-09-05 PROCEDURE — 74011250637 HC RX REV CODE- 250/637

## 2019-09-05 PROCEDURE — 77030002986 HC SUT PROL J&J -A

## 2019-09-05 PROCEDURE — 77030014647 HC SEAL FBRN TISSL BAXT -D

## 2019-09-05 PROCEDURE — 041L0JL BYPASS LEFT FEMORAL ARTERY TO POPLITEAL ARTERY WITH SYNTHETIC SUBSTITUTE, OPEN APPROACH: ICD-10-PCS

## 2019-09-05 PROCEDURE — 77030019908 HC STETH ESOPH SIMS -A: Performed by: ANESTHESIOLOGY

## 2019-09-05 PROCEDURE — P9016 RBC LEUKOCYTES REDUCED: HCPCS

## 2019-09-05 PROCEDURE — 77030031139 HC SUT VCRL2 J&J -A

## 2019-09-05 PROCEDURE — 77030008684 HC TU ET CUF COVD -B: Performed by: ANESTHESIOLOGY

## 2019-09-05 PROCEDURE — 77030016570 HC BLNKT BAIR HGGR 3M -B

## 2019-09-05 PROCEDURE — 77030002933 HC SUT MCRYL J&J -A

## 2019-09-05 PROCEDURE — 74011000250 HC RX REV CODE- 250

## 2019-09-05 PROCEDURE — 77030020782 HC GWN BAIR PAWS FLX 3M -B

## 2019-09-05 PROCEDURE — 77030026438 HC STYL ET INTUB CARD -A: Performed by: ANESTHESIOLOGY

## 2019-09-05 PROCEDURE — 77030013079 HC BLNKT BAIR HGGR 3M -A: Performed by: ANESTHESIOLOGY

## 2019-09-05 PROCEDURE — 77030040361 HC SLV COMPR DVT MDII -B

## 2019-09-05 PROCEDURE — 77030034850

## 2019-09-05 PROCEDURE — 77030039266 HC ADH SKN EXOFIN S2SG -A

## 2019-09-05 PROCEDURE — 76010000172 HC OR TIME 2.5 TO 3 HR INTENSV-TIER 1

## 2019-09-05 PROCEDURE — 94640 AIRWAY INHALATION TREATMENT: CPT

## 2019-09-05 DEVICE — IMPRA® EPTFE VASCULAR GRAFT, CARBOFLO® FLEX THINWALL SMALL BEADING CARBON-LINED, 7 MM X 80 CM
Type: IMPLANTABLE DEVICE | Site: LEG | Status: FUNCTIONAL
Brand: IMPRA® EPTFE VASCULAR GRAFTS

## 2019-09-05 RX ORDER — VANCOMYCIN/0.9 % SOD CHLORIDE 1.5G/250ML
1500 PLASTIC BAG, INJECTION (ML) INTRAVENOUS ONCE
Status: COMPLETED | OUTPATIENT
Start: 2019-09-05 | End: 2019-09-05

## 2019-09-05 RX ORDER — SODIUM CHLORIDE, SODIUM LACTATE, POTASSIUM CHLORIDE, CALCIUM CHLORIDE 600; 310; 30; 20 MG/100ML; MG/100ML; MG/100ML; MG/100ML
150 INJECTION, SOLUTION INTRAVENOUS CONTINUOUS
Status: DISCONTINUED | OUTPATIENT
Start: 2019-09-05 | End: 2019-09-05 | Stop reason: HOSPADM

## 2019-09-05 RX ORDER — POTASSIUM CHLORIDE 750 MG/1
10 TABLET, FILM COATED, EXTENDED RELEASE ORAL 2 TIMES DAILY
Status: DISCONTINUED | OUTPATIENT
Start: 2019-09-05 | End: 2019-09-09 | Stop reason: HOSPADM

## 2019-09-05 RX ORDER — GLIPIZIDE 2.5 MG/1
2.5 TABLET, EXTENDED RELEASE ORAL
Status: DISCONTINUED | OUTPATIENT
Start: 2019-09-05 | End: 2019-09-09 | Stop reason: HOSPADM

## 2019-09-05 RX ORDER — PHENYLEPHRINE HCL IN 0.9% NACL 0.4MG/10ML
SYRINGE (ML) INTRAVENOUS AS NEEDED
Status: DISCONTINUED | OUTPATIENT
Start: 2019-09-05 | End: 2019-09-05 | Stop reason: HOSPADM

## 2019-09-05 RX ORDER — ONDANSETRON 2 MG/ML
INJECTION INTRAMUSCULAR; INTRAVENOUS AS NEEDED
Status: DISCONTINUED | OUTPATIENT
Start: 2019-09-05 | End: 2019-09-05 | Stop reason: HOSPADM

## 2019-09-05 RX ORDER — DIPHENHYDRAMINE HYDROCHLORIDE 50 MG/ML
12.5 INJECTION, SOLUTION INTRAMUSCULAR; INTRAVENOUS AS NEEDED
Status: ACTIVE | OUTPATIENT
Start: 2019-09-05 | End: 2019-09-05

## 2019-09-05 RX ORDER — PANTOPRAZOLE SODIUM 40 MG/1
40 TABLET, DELAYED RELEASE ORAL
Status: DISCONTINUED | OUTPATIENT
Start: 2019-09-06 | End: 2019-09-09 | Stop reason: HOSPADM

## 2019-09-05 RX ORDER — CLOPIDOGREL BISULFATE 75 MG/1
75 TABLET ORAL EVERY EVENING
Status: DISCONTINUED | OUTPATIENT
Start: 2019-09-05 | End: 2019-09-09 | Stop reason: HOSPADM

## 2019-09-05 RX ORDER — HYDROMORPHONE HYDROCHLORIDE 2 MG/ML
INJECTION, SOLUTION INTRAMUSCULAR; INTRAVENOUS; SUBCUTANEOUS AS NEEDED
Status: DISCONTINUED | OUTPATIENT
Start: 2019-09-05 | End: 2019-09-05 | Stop reason: HOSPADM

## 2019-09-05 RX ORDER — DULOXETIN HYDROCHLORIDE 30 MG/1
60 CAPSULE, DELAYED RELEASE ORAL DAILY
Status: DISCONTINUED | OUTPATIENT
Start: 2019-09-06 | End: 2019-09-09 | Stop reason: HOSPADM

## 2019-09-05 RX ORDER — LANOLIN ALCOHOL/MO/W.PET/CERES
400 CREAM (GRAM) TOPICAL
Status: DISCONTINUED | OUTPATIENT
Start: 2019-09-05 | End: 2019-09-09 | Stop reason: HOSPADM

## 2019-09-05 RX ORDER — LIDOCAINE HYDROCHLORIDE 10 MG/ML
0.1 INJECTION, SOLUTION EPIDURAL; INFILTRATION; INTRACAUDAL; PERINEURAL AS NEEDED
Status: DISCONTINUED | OUTPATIENT
Start: 2019-09-05 | End: 2019-09-05 | Stop reason: HOSPADM

## 2019-09-05 RX ORDER — HYDROMORPHONE HYDROCHLORIDE 2 MG/ML
1 INJECTION, SOLUTION INTRAMUSCULAR; INTRAVENOUS; SUBCUTANEOUS
Status: DISCONTINUED | OUTPATIENT
Start: 2019-09-05 | End: 2019-09-07

## 2019-09-05 RX ORDER — PROMETHAZINE HYDROCHLORIDE 25 MG/1
25 TABLET ORAL
Status: DISCONTINUED | OUTPATIENT
Start: 2019-09-05 | End: 2019-09-09 | Stop reason: HOSPADM

## 2019-09-05 RX ORDER — SUCCINYLCHOLINE CHLORIDE 20 MG/ML
INJECTION INTRAMUSCULAR; INTRAVENOUS AS NEEDED
Status: DISCONTINUED | OUTPATIENT
Start: 2019-09-05 | End: 2019-09-05 | Stop reason: HOSPADM

## 2019-09-05 RX ORDER — IPRATROPIUM BROMIDE 0.5 MG/2.5ML
0.5 SOLUTION RESPIRATORY (INHALATION)
Status: DISCONTINUED | OUTPATIENT
Start: 2019-09-05 | End: 2019-09-09 | Stop reason: HOSPADM

## 2019-09-05 RX ORDER — HEPARIN SODIUM 1000 [USP'U]/ML
INJECTION, SOLUTION INTRAVENOUS; SUBCUTANEOUS AS NEEDED
Status: DISCONTINUED | OUTPATIENT
Start: 2019-09-05 | End: 2019-09-05 | Stop reason: HOSPADM

## 2019-09-05 RX ORDER — SODIUM CHLORIDE 9 MG/ML
250 INJECTION, SOLUTION INTRAVENOUS AS NEEDED
Status: DISCONTINUED | OUTPATIENT
Start: 2019-09-05 | End: 2019-09-05

## 2019-09-05 RX ORDER — ESMOLOL HYDROCHLORIDE 10 MG/ML
INJECTION INTRAVENOUS AS NEEDED
Status: DISCONTINUED | OUTPATIENT
Start: 2019-09-05 | End: 2019-09-05 | Stop reason: HOSPADM

## 2019-09-05 RX ORDER — AZELASTINE 1 MG/ML
2 SPRAY, METERED NASAL 2 TIMES DAILY
Status: DISCONTINUED | OUTPATIENT
Start: 2019-09-06 | End: 2019-09-09 | Stop reason: HOSPADM

## 2019-09-05 RX ORDER — ROCURONIUM BROMIDE 10 MG/ML
INJECTION, SOLUTION INTRAVENOUS AS NEEDED
Status: DISCONTINUED | OUTPATIENT
Start: 2019-09-05 | End: 2019-09-05 | Stop reason: HOSPADM

## 2019-09-05 RX ORDER — MIRTAZAPINE 15 MG/1
15 TABLET, FILM COATED ORAL
Status: DISCONTINUED | OUTPATIENT
Start: 2019-09-05 | End: 2019-09-09 | Stop reason: HOSPADM

## 2019-09-05 RX ORDER — PROPOFOL 10 MG/ML
INJECTION, EMULSION INTRAVENOUS AS NEEDED
Status: DISCONTINUED | OUTPATIENT
Start: 2019-09-05 | End: 2019-09-05 | Stop reason: HOSPADM

## 2019-09-05 RX ORDER — SODIUM CHLORIDE 9 MG/ML
INJECTION, SOLUTION INTRAVENOUS
Status: DISCONTINUED | OUTPATIENT
Start: 2019-09-05 | End: 2019-09-05 | Stop reason: HOSPADM

## 2019-09-05 RX ORDER — SODIUM CHLORIDE 0.9 % (FLUSH) 0.9 %
5-40 SYRINGE (ML) INJECTION EVERY 8 HOURS
Status: DISCONTINUED | OUTPATIENT
Start: 2019-09-05 | End: 2019-09-09 | Stop reason: HOSPADM

## 2019-09-05 RX ORDER — PREDNISONE 5 MG/1
10 TABLET ORAL 2 TIMES DAILY WITH MEALS
Status: DISCONTINUED | OUTPATIENT
Start: 2019-09-06 | End: 2019-09-09 | Stop reason: HOSPADM

## 2019-09-05 RX ORDER — PHENOL/SODIUM PHENOLATE
20 AEROSOL, SPRAY (ML) MUCOUS MEMBRANE 2 TIMES DAILY
Status: DISCONTINUED | OUTPATIENT
Start: 2019-09-05 | End: 2019-09-05 | Stop reason: SDUPTHER

## 2019-09-05 RX ORDER — HEPARIN SODIUM 5000 [USP'U]/ML
5000 INJECTION, SOLUTION INTRAVENOUS; SUBCUTANEOUS EVERY 8 HOURS
Status: DISCONTINUED | OUTPATIENT
Start: 2019-09-05 | End: 2019-09-05

## 2019-09-05 RX ORDER — FLUTICASONE PROPIONATE 50 MCG
2 SPRAY, SUSPENSION (ML) NASAL 2 TIMES DAILY
Status: DISCONTINUED | OUTPATIENT
Start: 2019-09-06 | End: 2019-09-09 | Stop reason: HOSPADM

## 2019-09-05 RX ORDER — METOPROLOL TARTRATE 25 MG/1
25 TABLET, FILM COATED ORAL
Status: DISCONTINUED | OUTPATIENT
Start: 2019-09-05 | End: 2019-09-09 | Stop reason: HOSPADM

## 2019-09-05 RX ORDER — MIDAZOLAM HYDROCHLORIDE 1 MG/ML
INJECTION, SOLUTION INTRAMUSCULAR; INTRAVENOUS AS NEEDED
Status: DISCONTINUED | OUTPATIENT
Start: 2019-09-05 | End: 2019-09-05 | Stop reason: HOSPADM

## 2019-09-05 RX ORDER — ONDANSETRON 2 MG/ML
4 INJECTION INTRAMUSCULAR; INTRAVENOUS AS NEEDED
Status: DISCONTINUED | OUTPATIENT
Start: 2019-09-05 | End: 2019-09-09 | Stop reason: HOSPADM

## 2019-09-05 RX ORDER — LIDOCAINE HYDROCHLORIDE 20 MG/ML
INJECTION, SOLUTION EPIDURAL; INFILTRATION; INTRACAUDAL; PERINEURAL AS NEEDED
Status: DISCONTINUED | OUTPATIENT
Start: 2019-09-05 | End: 2019-09-05 | Stop reason: HOSPADM

## 2019-09-05 RX ORDER — ALBUTEROL SULFATE 0.83 MG/ML
2.5 SOLUTION RESPIRATORY (INHALATION) AS NEEDED
Status: DISCONTINUED | OUTPATIENT
Start: 2019-09-05 | End: 2019-09-09 | Stop reason: HOSPADM

## 2019-09-05 RX ORDER — FENTANYL CITRATE 50 UG/ML
INJECTION, SOLUTION INTRAMUSCULAR; INTRAVENOUS AS NEEDED
Status: DISCONTINUED | OUTPATIENT
Start: 2019-09-05 | End: 2019-09-05 | Stop reason: HOSPADM

## 2019-09-05 RX ORDER — SODIUM CHLORIDE, SODIUM LACTATE, POTASSIUM CHLORIDE, CALCIUM CHLORIDE 600; 310; 30; 20 MG/100ML; MG/100ML; MG/100ML; MG/100ML
75 INJECTION, SOLUTION INTRAVENOUS CONTINUOUS
Status: DISCONTINUED | OUTPATIENT
Start: 2019-09-05 | End: 2019-09-06

## 2019-09-05 RX ORDER — HYDROMORPHONE HYDROCHLORIDE 1 MG/ML
0.5 INJECTION, SOLUTION INTRAMUSCULAR; INTRAVENOUS; SUBCUTANEOUS
Status: DISCONTINUED | OUTPATIENT
Start: 2019-09-05 | End: 2019-09-05 | Stop reason: HOSPADM

## 2019-09-05 RX ORDER — MONTELUKAST SODIUM 10 MG/1
10 TABLET ORAL DAILY
Status: DISCONTINUED | OUTPATIENT
Start: 2019-09-06 | End: 2019-09-09 | Stop reason: HOSPADM

## 2019-09-05 RX ORDER — TRAMADOL HYDROCHLORIDE 50 MG/1
50 TABLET ORAL
Status: DISCONTINUED | OUTPATIENT
Start: 2019-09-05 | End: 2019-09-09 | Stop reason: HOSPADM

## 2019-09-05 RX ORDER — ALBUTEROL SULFATE 0.83 MG/ML
2.5 SOLUTION RESPIRATORY (INHALATION)
Status: DISCONTINUED | OUTPATIENT
Start: 2019-09-05 | End: 2019-09-09 | Stop reason: HOSPADM

## 2019-09-05 RX ORDER — MELATONIN
2000 DAILY
Status: DISCONTINUED | OUTPATIENT
Start: 2019-09-06 | End: 2019-09-09 | Stop reason: HOSPADM

## 2019-09-05 RX ORDER — SODIUM CHLORIDE, SODIUM LACTATE, POTASSIUM CHLORIDE, CALCIUM CHLORIDE 600; 310; 30; 20 MG/100ML; MG/100ML; MG/100ML; MG/100ML
125 INJECTION, SOLUTION INTRAVENOUS CONTINUOUS
Status: DISCONTINUED | OUTPATIENT
Start: 2019-09-05 | End: 2019-09-09 | Stop reason: HOSPADM

## 2019-09-05 RX ORDER — SODIUM CHLORIDE 0.9 % (FLUSH) 0.9 %
5-40 SYRINGE (ML) INJECTION AS NEEDED
Status: DISCONTINUED | OUTPATIENT
Start: 2019-09-05 | End: 2019-09-09 | Stop reason: HOSPADM

## 2019-09-05 RX ADMIN — HYDROMORPHONE HYDROCHLORIDE 1 MG: 2 INJECTION INTRAMUSCULAR; INTRAVENOUS; SUBCUTANEOUS at 18:00

## 2019-09-05 RX ADMIN — SODIUM CHLORIDE, SODIUM LACTATE, POTASSIUM CHLORIDE, AND CALCIUM CHLORIDE 75 ML/HR: 600; 310; 30; 20 INJECTION, SOLUTION INTRAVENOUS at 20:57

## 2019-09-05 RX ADMIN — VANCOMYCIN HYDROCHLORIDE 1500 MG: 10 INJECTION, POWDER, LYOPHILIZED, FOR SOLUTION INTRAVENOUS at 13:53

## 2019-09-05 RX ADMIN — ONDANSETRON 4 MG: 2 INJECTION INTRAMUSCULAR; INTRAVENOUS at 18:06

## 2019-09-05 RX ADMIN — Medication 10 ML: at 22:36

## 2019-09-05 RX ADMIN — SUCCINYLCHOLINE CHLORIDE 100 MG: 20 INJECTION, SOLUTION INTRAMUSCULAR; INTRAVENOUS; PARENTERAL at 16:09

## 2019-09-05 RX ADMIN — CLOPIDOGREL BISULFATE 75 MG: 75 TABLET ORAL at 21:48

## 2019-09-05 RX ADMIN — PROPOFOL 50 MG: 10 INJECTION, EMULSION INTRAVENOUS at 16:09

## 2019-09-05 RX ADMIN — LIDOCAINE HYDROCHLORIDE 40 MG: 20 INJECTION, SOLUTION INTRAVENOUS at 16:08

## 2019-09-05 RX ADMIN — APIXABAN 5 MG: 5 TABLET, FILM COATED ORAL at 21:48

## 2019-09-05 RX ADMIN — SODIUM CHLORIDE: 9 INJECTION, SOLUTION INTRAVENOUS at 16:15

## 2019-09-05 RX ADMIN — ROCURONIUM BROMIDE 5 MG: 50 INJECTION, SOLUTION INTRAVENOUS at 16:08

## 2019-09-05 RX ADMIN — IPRATROPIUM BROMIDE 0.5 MG: 0.5 SOLUTION RESPIRATORY (INHALATION) at 22:12

## 2019-09-05 RX ADMIN — PROPOFOL 100 MG: 10 INJECTION, EMULSION INTRAVENOUS at 16:08

## 2019-09-05 RX ADMIN — ESMOLOL HYDROCHLORIDE 20 MG: 100 INJECTION, SOLUTION INTRAVENOUS at 18:46

## 2019-09-05 RX ADMIN — Medication 80 MCG: at 16:15

## 2019-09-05 RX ADMIN — Medication 40 MCG: at 16:35

## 2019-09-05 RX ADMIN — SODIUM CHLORIDE, SODIUM LACTATE, POTASSIUM CHLORIDE, AND CALCIUM CHLORIDE 150 ML/HR: 600; 310; 30; 20 INJECTION, SOLUTION INTRAVENOUS at 13:53

## 2019-09-05 RX ADMIN — POTASSIUM CHLORIDE 10 MEQ: 750 TABLET, FILM COATED, EXTENDED RELEASE ORAL at 22:35

## 2019-09-05 RX ADMIN — ROCURONIUM BROMIDE 25 MG: 50 INJECTION, SOLUTION INTRAVENOUS at 16:28

## 2019-09-05 RX ADMIN — HEPARIN SODIUM 5000 UNITS: 1000 INJECTION INTRAVENOUS; SUBCUTANEOUS at 17:11

## 2019-09-05 RX ADMIN — PHENYLEPHRINE HYDROCHLORIDE 30 MCG: 10 INJECTION INTRAVENOUS at 16:18

## 2019-09-05 RX ADMIN — ROCURONIUM BROMIDE 20 MG: 50 INJECTION, SOLUTION INTRAVENOUS at 16:26

## 2019-09-05 RX ADMIN — MAGNESIUM OXIDE TAB 400 MG (241.3 MG ELEMENTAL MG) 400 MG: 400 (241.3 MG) TAB at 22:35

## 2019-09-05 RX ADMIN — MIDAZOLAM HYDROCHLORIDE 2 MG: 1 INJECTION, SOLUTION INTRAMUSCULAR; INTRAVENOUS at 16:05

## 2019-09-05 RX ADMIN — HYDROMORPHONE HYDROCHLORIDE 1 MG: 2 INJECTION INTRAMUSCULAR; INTRAVENOUS; SUBCUTANEOUS at 18:32

## 2019-09-05 RX ADMIN — SUGAMMADEX 200 MG: 100 INJECTION, SOLUTION INTRAVENOUS at 19:12

## 2019-09-05 RX ADMIN — MIRTAZAPINE 15 MG: 15 TABLET, FILM COATED ORAL at 22:35

## 2019-09-05 RX ADMIN — GLIPIZIDE 2.5 MG: 2.5 TABLET, FILM COATED, EXTENDED RELEASE ORAL at 22:35

## 2019-09-05 RX ADMIN — FENTANYL CITRATE 100 MCG: 50 INJECTION, SOLUTION INTRAMUSCULAR; INTRAVENOUS at 16:08

## 2019-09-05 NOTE — ANESTHESIA PREPROCEDURE EVALUATION
Anesthetic History   No history of anesthetic complications            Review of Systems / Medical History  Patient summary reviewed and pertinent labs reviewed    Pulmonary    COPD: moderate    Sleep apnea: CPAP  Shortness of breath  Asthma        Neuro/Psych   Within defined limits           Cardiovascular    Hypertension        Dysrhythmias : atrial fibrillation  Past MI (2015), CAD, PAD, cardiac stents (x2 in 2015, x1 in 2018) and hyperlipidemia    Exercise tolerance: <4 METS  Comments: On BB,No Eliquis Plavix since Monday  Echo in July 2019 WNL        GI/Hepatic/Renal     GERD    Renal disease: CRI  Hiatal hernia    Comments: Renal insufficiency, Cr.  Endo/Other    Diabetes    Morbid obesity, blood dyscrasia and anemia (Hgb 7.2)     Other Findings   Comments: Fibromyalgia  Daily prednisone use for temporal arteritis  History of recurrent DVT, thrombectomy and finger amp in April         Physical Exam    Airway  Mallampati: III  TM Distance: 4 - 6 cm  Neck ROM: normal range of motion, short neck   Mouth opening: Normal     Cardiovascular    Rhythm: regular  Rate: normal         Dental    Dentition: Lower dentition intact, Upper dentition intact and Caps/crowns     Pulmonary  Breath sounds clear to auscultation               Abdominal  GI exam deferred       Other Findings            Anesthetic Plan    ASA: 4  Anesthesia type: general        Post procedure ventilation   Induction: Intravenous  Anesthetic plan and risks discussed with: Patient      CMAC for intubation. Stress dose steroids  Discussed possibility of postop ventilation due to patients cardiopulmonary disease.  Pt understood

## 2019-09-05 NOTE — PERIOP NOTES
Pt's Hgb 7.2, Hct 26.4 and WBCs 18.9. Dr. Olive Ferreira was made aware and gave a verbal order for type and crossmatch for blood hold. Order placed per MD instructions.

## 2019-09-05 NOTE — PERIOP NOTES
Pt. Fall Protocol  Yellow armband on pt, yellow non skid socks on  Bed in low position, all side rails up, call bell in reach. Pt and family instructed in \" call don't fall\" protocol  Use your call bell and wait for assistance, staff not family, will assist you to get up and move about  Pt and family verbalize an understanding of fall precautions and \" call don't fall\" protocol.

## 2019-09-05 NOTE — BRIEF OP NOTE
BRIEF OPERATIVE NOTE    Date of Procedure: 9/5/2019   Preoperative Diagnosis: ASO WITH REST PAIN  Postoperative Diagnosis: ASO WITH REST PAIN    Procedure(s):  LEFT FEMORAL-POPLITEAL BYPASS WITH PTFE  Surgeon(s) and Role:     Reina Quresih MD - Primary         Surgical Assistant: 0    Surgical Staff:  Circ-1: Abel Little RN  Scrub Tech-1: Jase Clark; Michail Peak  Surg Asst-1: Thao MORA  Event Time In Time Out   Incision Start 1635    Incision Close       Anesthesia: General   Estimated Blood Loss: 100  Specimens: * No specimens in log *   Findings: 0   Complications: 0  Implants:   Implant Name Type Inv.  Item Serial No.  Lot No. LRB No. Used Action   GRAFT IMPRA CARBONFLO [id number] --  - SNA  GRAFT IMPRA CARBONFLO [id number] --  NA BARD PERIPHERAL VASCULAR [id number] Left 1 Implanted

## 2019-09-06 LAB
ATRIAL RATE: 110 BPM
BASOPHILS # BLD: 0 K/UL (ref 0–0.1)
BASOPHILS NFR BLD: 0 % (ref 0–1)
CALCULATED P AXIS, ECG09: 65 DEGREES
CALCULATED R AXIS, ECG10: -45 DEGREES
CALCULATED T AXIS, ECG11: 33 DEGREES
DIAGNOSIS, 93000: NORMAL
DIFFERENTIAL METHOD BLD: ABNORMAL
EOSINOPHIL # BLD: 0.2 K/UL (ref 0–0.4)
EOSINOPHIL NFR BLD: 1 % (ref 0–7)
ERYTHROCYTE [DISTWIDTH] IN BLOOD BY AUTOMATED COUNT: 17.9 % (ref 11.5–14.5)
GLUCOSE BLD STRIP.AUTO-MCNC: 138 MG/DL (ref 65–100)
GLUCOSE BLD STRIP.AUTO-MCNC: 153 MG/DL (ref 65–100)
GLUCOSE BLD STRIP.AUTO-MCNC: 163 MG/DL (ref 65–100)
GLUCOSE BLD STRIP.AUTO-MCNC: 247 MG/DL (ref 65–100)
HCT VFR BLD AUTO: 27.4 % (ref 35–47)
HGB BLD-MCNC: 7.5 G/DL (ref 11.5–16)
IMM GRANULOCYTES # BLD AUTO: 0 K/UL (ref 0–0.04)
IMM GRANULOCYTES NFR BLD AUTO: 0 % (ref 0–0.5)
LYMPHOCYTES # BLD: 2.1 K/UL (ref 0.8–3.5)
LYMPHOCYTES NFR BLD: 13 % (ref 12–49)
MCH RBC QN AUTO: 22 PG (ref 26–34)
MCHC RBC AUTO-ENTMCNC: 27.4 G/DL (ref 30–36.5)
MCV RBC AUTO: 80.4 FL (ref 80–99)
MONOCYTES # BLD: 0.8 K/UL (ref 0–1)
MONOCYTES NFR BLD: 5 % (ref 5–13)
NEUTS BAND NFR BLD MANUAL: 4 %
NEUTS SEG # BLD: 13.2 K/UL (ref 1.8–8)
NEUTS SEG NFR BLD: 77 % (ref 32–75)
NRBC # BLD: 0.14 K/UL (ref 0–0.01)
NRBC BLD-RTO: 0.9 PER 100 WBC
P-R INTERVAL, ECG05: 136 MS
PLATELET # BLD AUTO: 456 K/UL (ref 150–400)
PMV BLD AUTO: 8.9 FL (ref 8.9–12.9)
Q-T INTERVAL, ECG07: 342 MS
QRS DURATION, ECG06: 76 MS
QTC CALCULATION (BEZET), ECG08: 462 MS
RBC # BLD AUTO: 3.41 M/UL (ref 3.8–5.2)
RBC MORPH BLD: ABNORMAL
SERVICE CMNT-IMP: ABNORMAL
VENTRICULAR RATE, ECG03: 110 BPM
WBC # BLD AUTO: 16.3 K/UL (ref 3.6–11)
WBC MORPH BLD: ABNORMAL

## 2019-09-06 PROCEDURE — 82962 GLUCOSE BLOOD TEST: CPT

## 2019-09-06 PROCEDURE — 36415 COLL VENOUS BLD VENIPUNCTURE: CPT

## 2019-09-06 PROCEDURE — 74011250636 HC RX REV CODE- 250/636

## 2019-09-06 PROCEDURE — 74011250637 HC RX REV CODE- 250/637

## 2019-09-06 PROCEDURE — 85025 COMPLETE CBC W/AUTO DIFF WBC: CPT

## 2019-09-06 PROCEDURE — 65270000029 HC RM PRIVATE

## 2019-09-06 PROCEDURE — 74011000250 HC RX REV CODE- 250

## 2019-09-06 PROCEDURE — 74011000250 HC RX REV CODE- 250: Performed by: NURSE ANESTHETIST, CERTIFIED REGISTERED

## 2019-09-06 PROCEDURE — 94640 AIRWAY INHALATION TREATMENT: CPT

## 2019-09-06 PROCEDURE — 93005 ELECTROCARDIOGRAM TRACING: CPT

## 2019-09-06 PROCEDURE — 74011636637 HC RX REV CODE- 636/637

## 2019-09-06 PROCEDURE — 51798 US URINE CAPACITY MEASURE: CPT

## 2019-09-06 RX ORDER — MAGNESIUM SULFATE 100 %
4 CRYSTALS MISCELLANEOUS AS NEEDED
Status: DISCONTINUED | OUTPATIENT
Start: 2019-09-06 | End: 2019-09-09 | Stop reason: HOSPADM

## 2019-09-06 RX ORDER — DEXTROSE MONOHYDRATE 100 MG/ML
0-250 INJECTION, SOLUTION INTRAVENOUS AS NEEDED
Status: DISCONTINUED | OUTPATIENT
Start: 2019-09-06 | End: 2019-09-09 | Stop reason: HOSPADM

## 2019-09-06 RX ORDER — SODIUM CHLORIDE 9 MG/ML
75 INJECTION, SOLUTION INTRAVENOUS CONTINUOUS
Status: DISCONTINUED | OUTPATIENT
Start: 2019-09-06 | End: 2019-09-09 | Stop reason: HOSPADM

## 2019-09-06 RX ORDER — ALBUTEROL SULFATE 0.83 MG/ML
2.5 SOLUTION RESPIRATORY (INHALATION) ONCE
Status: COMPLETED | OUTPATIENT
Start: 2019-09-06 | End: 2019-09-06

## 2019-09-06 RX ADMIN — FLUTICASONE PROPIONATE 2 SPRAY: 50 SPRAY, METERED NASAL at 08:43

## 2019-09-06 RX ADMIN — SODIUM CHLORIDE, SODIUM LACTATE, POTASSIUM CHLORIDE, AND CALCIUM CHLORIDE 75 ML/HR: 600; 310; 30; 20 INJECTION, SOLUTION INTRAVENOUS at 12:30

## 2019-09-06 RX ADMIN — MAGNESIUM OXIDE TAB 400 MG (241.3 MG ELEMENTAL MG) 400 MG: 400 (241.3 MG) TAB at 21:19

## 2019-09-06 RX ADMIN — GLIPIZIDE 2.5 MG: 2.5 TABLET, FILM COATED, EXTENDED RELEASE ORAL at 21:19

## 2019-09-06 RX ADMIN — AZELASTINE HYDROCHLORIDE 2 SPRAY: 137 SPRAY, METERED NASAL at 20:10

## 2019-09-06 RX ADMIN — AZELASTINE HYDROCHLORIDE 2 SPRAY: 137 SPRAY, METERED NASAL at 08:43

## 2019-09-06 RX ADMIN — APIXABAN 5 MG: 5 TABLET, FILM COATED ORAL at 20:09

## 2019-09-06 RX ADMIN — PREDNISONE 10 MG: 10 TABLET ORAL at 08:34

## 2019-09-06 RX ADMIN — IPRATROPIUM BROMIDE 0.5 MG: 0.5 SOLUTION RESPIRATORY (INHALATION) at 19:16

## 2019-09-06 RX ADMIN — MIRTAZAPINE 15 MG: 15 TABLET, FILM COATED ORAL at 21:19

## 2019-09-06 RX ADMIN — POTASSIUM CHLORIDE 10 MEQ: 750 TABLET, FILM COATED, EXTENDED RELEASE ORAL at 08:34

## 2019-09-06 RX ADMIN — PANTOPRAZOLE SODIUM 40 MG: 40 TABLET, DELAYED RELEASE ORAL at 08:34

## 2019-09-06 RX ADMIN — VITAMIN D, TAB 1000IU (100/BT) 2000 UNITS: 25 TAB at 08:34

## 2019-09-06 RX ADMIN — Medication 10 ML: at 21:20

## 2019-09-06 RX ADMIN — APIXABAN 5 MG: 5 TABLET, FILM COATED ORAL at 08:34

## 2019-09-06 RX ADMIN — IPRATROPIUM BROMIDE 0.5 MG: 0.5 SOLUTION RESPIRATORY (INHALATION) at 13:43

## 2019-09-06 RX ADMIN — CLOPIDOGREL BISULFATE 75 MG: 75 TABLET ORAL at 21:19

## 2019-09-06 RX ADMIN — FLUTICASONE PROPIONATE 2 SPRAY: 50 SPRAY, METERED NASAL at 20:10

## 2019-09-06 RX ADMIN — MONTELUKAST SODIUM 10 MG: 10 TABLET, COATED ORAL at 08:34

## 2019-09-06 RX ADMIN — DULOXETINE HYDROCHLORIDE 60 MG: 30 CAPSULE, DELAYED RELEASE ORAL at 08:34

## 2019-09-06 RX ADMIN — IPRATROPIUM BROMIDE 0.5 MG: 0.5 SOLUTION RESPIRATORY (INHALATION) at 02:25

## 2019-09-06 RX ADMIN — SAXAGLIPTIN 5 MG: 5 TABLET, FILM COATED ORAL at 08:43

## 2019-09-06 RX ADMIN — SODIUM CHLORIDE 75 ML/HR: 900 INJECTION, SOLUTION INTRAVENOUS at 22:15

## 2019-09-06 RX ADMIN — POTASSIUM CHLORIDE 10 MEQ: 750 TABLET, FILM COATED, EXTENDED RELEASE ORAL at 19:09

## 2019-09-06 RX ADMIN — HYDROMORPHONE HYDROCHLORIDE 1 MG: 2 INJECTION INTRAMUSCULAR; INTRAVENOUS; SUBCUTANEOUS at 21:19

## 2019-09-06 RX ADMIN — ALBUTEROL SULFATE 2.5 MG: 2.5 SOLUTION RESPIRATORY (INHALATION) at 19:16

## 2019-09-06 RX ADMIN — PREDNISONE 10 MG: 10 TABLET ORAL at 19:10

## 2019-09-06 RX ADMIN — HYDROMORPHONE HYDROCHLORIDE 1 MG: 2 INJECTION INTRAMUSCULAR; INTRAVENOUS; SUBCUTANEOUS at 02:53

## 2019-09-06 NOTE — PROGRESS NOTES
0700- Bedside and Verbal shift change report given to Vilma/MADAI Go (oncoming nurse) by Kianna Starr RN (offgoing nurse). Report included the following information SBAR, Kardex, Procedure Summary, Intake/Output, MAR, Recent Results and Cardiac Rhythm sinus tach. 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 Pt is missing R ring finger, and tips of R middle and R index finger. 0930- pt CO CP, states it is similar to previous MI pain. States she is dizzy. EKG completed with no acute MI noted. Pt at up in bed, belching and feels better. 1030- pt sleeping. NAD noted. 1100- Bahena catheter removed. Pt tolerated well. NAD noted. Pt resting quietly, pt refused breakfast.   1115- pt refused to participate in PT. Told PT to come back later. Pt is shivering, stating she is cold. 1330- pt is sitting upright in bed, talking on the phone. NAD noted. 1450- pt still has not voided. Pt aware to call out if needs assistance to void. 1615- attempt to call report with no answer. 1625- TRANSFER - OUT REPORT:    Verbal report given to MADAI Desouza(name) on Jordon Kelleymagalis  being transferred to Med/Surg(unit) for routine progression of care       Report consisted of patients Situation, Background, Assessment and   Recommendations(SBAR). Information from the following report(s) SBAR, Kardex, MAR, Recent Results and Cardiac Rhythm sinus tach was reviewed with the receiving nurse.     Lines:   Peripheral IV 09/05/19 Left Antecubital (Active)   Site Assessment Clean, dry, & intact 9/6/2019  3:45 PM   Phlebitis Assessment 0 9/6/2019  3:45 PM   Infiltration Assessment 0 9/6/2019  3:45 PM   Dressing Status Clean, dry, & intact 9/6/2019  3:45 PM   Dressing Type Transparent;Tape 9/6/2019  3:45 PM   Hub Color/Line Status Pink;Capped 9/6/2019 3:45 PM   Action Taken Open ports on tubing capped 9/6/2019  3:45 PM   Alcohol Cap Used Yes 9/6/2019  3:45 PM       Peripheral IV 09/05/19 Anterior;Right Forearm (Active)   Site Assessment Clean, dry, & intact 9/6/2019  3:45 PM   Phlebitis Assessment 0 9/6/2019  3:45 PM   Infiltration Assessment 0 9/6/2019  3:45 PM   Dressing Status Clean, dry, & intact 9/6/2019  3:45 PM   Dressing Type Transparent 9/6/2019  3:45 PM   Hub Color/Line Status Pink;Capped 9/6/2019  3:45 PM   Action Taken Open ports on tubing capped 9/6/2019  3:45 PM   Alcohol Cap Used Yes 9/6/2019  3:45 PM        Opportunity for questions and clarification was provided.       Patient transported with:   O2 @ 2 liters  Registered Nurse

## 2019-09-06 NOTE — PROGRESS NOTES
Reason for Admission:   Left fem pop bypass               RRAT Score:  RRAT score is 45             Resources/supports as identified by patient/family:   Pt has a suportive daughter & granddaughter                Top Challenges facing patient (as identified by patient/family and CM): Finances/Medication cost?     Pt has [employer] as her secondary insurance              Transportation? Will be transported by daughter            Support system or lack thereof? Supportive daughter                     Living arrangements? Lives in an apartment in independent living @ [facility] and had home health services through [facility]              Self-care/ADLs/Cognition? Pt typically is independent with self care . Pt is cognitively intact          Current Advanced Directive/Advance Care Plan:  Full code,no AMD on file                          Plan for utilizing home health:    Plans are for resumption of home health for SN,PT and OT through [facility] versus going to SNF @ [facility]. Transition of Care Plan:                  Pt has had multiple admissions to [facility] this year. 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 Her cell number is [phone]. Her home number is [phone] . Her work number is [phone]. Pt lives by herself in independent living @ [facility]. Pt is independent with her ADLs. Post-hospital stays,her granddaughter will stay with pt @ her [facility]. At her [facility] apartment,pt has grab bars in the shower and toilet areas, a rollator,and 1/2 bed rail. Pt prepares some of her meals and also gets meals from the cafe @ [facility].     In talking with pt this am,pt states she feels she may need to go to rehab @ SNF. This will require a 3 midnight hospital stay plus PT and OT consults. At pt.'s request,I attempted to contact her daughter @ [phone] to discuss disposition. I left a VM on daughter's voice mail. At pt.'s request,I have sent clinicals to [facility] and to [facility] SNF. I will continue to follow pt for discharge needs. Danitza Medrano,RN     Addendum-Nursing when physician rounds,please ask him if pt can have PT & OT orders.     Yfn Russ

## 2019-09-06 NOTE — PROGRESS NOTES
Physical Therapy orders acknowledged, chart reviewed and discussed with nurse who was in the room with the patient giving meds. Patient refuse to get up with therapy, explained benefits of OOB to chair patient continue to refuse. We will continue to follow up with the patient for therapy. Thank you.

## 2019-09-06 NOTE — PERIOP NOTES
TRANSFER - OUT REPORT:    Verbal report given to rn alem(name) on Sudhir Pozo  being transferred to [facility] for routine post - op       Report consisted of patients Situation, Background, Assessment and   Recommendations(SBAR). Information from the following report(s) SBAR, OR Summary, Procedure Summary, Intake/Output, MAR and Cardiac Rhythm st was reviewed with the receiving nurse. Lines:   Peripheral IV 09/05/19 Left Antecubital (Active)   Site Assessment Clean, dry, & intact 9/5/2019  8:12 PM   Phlebitis Assessment 0 9/5/2019  8:12 PM   Infiltration Assessment 0 9/5/2019  8:12 PM   Dressing Status Clean, dry, & intact 9/5/2019  8:12 PM   Dressing Type Tape;Transparent 9/5/2019  8:12 PM   Hub Color/Line Status Pink 9/5/2019  8:12 PM   Alcohol Cap Used Yes 9/5/2019  8:12 PM       Peripheral IV 09/05/19 Anterior;Right Forearm (Active)   Site Assessment Clean, dry, & intact 9/5/2019  8:12 PM   Phlebitis Assessment 0 9/5/2019  8:12 PM   Infiltration Assessment 0 9/5/2019  8:12 PM   Dressing Status Clean, dry, & intact 9/5/2019  8:12 PM   Dressing Type Tape;Transparent 9/5/2019  8:12 PM   Hub Color/Line Status Pink 9/5/2019  8:12 PM   Alcohol Cap Used Yes 9/5/2019  8:12 PM        Opportunity for questions and clarification was provided.       Patient transported with:   Monitor

## 2019-09-06 NOTE — DIABETES MGMT
[facility]    [facility] Progress Note    Recommendations/ Comments: Chart reviewed for daily rounding. If appropriate, please consider:  1. Add consistent carb or no concentrated sweets to diet order. Current hospital DM medication:   Glipizide SR 2.5 mg nightly  Onglyza 5 mg daily    Chart reviewed on [facility]. Patient is a 72 y.o. female with known Type 2 Diabetes on oral agents (dual therapy): glipizide GITS (Glucotrol-XL) 2.5 mg nightly, Onglyza 5 mg daily at home. A1c:   Lab Results   Component Value Date/Time    Hemoglobin A1c 7.7 (H) 05/17/2019 06:22 PM    Hemoglobin A1c 8.5 (H) 11/13/2018 03:26 PM       Recent Glucose Results:   Lab Results   Component Value Date/Time    GLUCPOC 138 (H) 09/06/2019 08:33 AM    GLUCPOC 121 (H) 09/05/2019 07:12 PM    GLUCPOC 116 (H) 09/05/2019 01:40 PM        Lab Results   Component Value Date/Time    Creatinine 1.33 (H) 08/30/2019 03:18 PM     Estimated Creatinine Clearance: 49.3 mL/min (A) (based on SCr of 1.33 mg/dL (H)). Active Orders   Diet    DIET REGULAR        PO intake: No data found. Will continue to follow as needed.     Thank you  Kim Lizarraga, RN  Office [phone]  Pager [phone]          Time spent: 3 min

## 2019-09-06 NOTE — ANESTHESIA POSTPROCEDURE EVALUATION
Procedure(s):  LEFT FEMORAL-POPLITEAL BYPASS WITH PTFE. general    Anesthesia Post Evaluation      Multimodal analgesia: multimodal analgesia used between 6 hours prior to anesthesia start to PACU discharge  Patient location during evaluation: PACU  Patient participation: complete - patient participated  Level of consciousness: awake and alert  Pain management: adequate  Airway patency: patent  Anesthetic complications: no  Cardiovascular status: acceptable  Respiratory status: acceptable  Hydration status: acceptable  Comments: Pt extubated in PACU and given sugammadex. No complications with extubation, dyspnea, or hypoxia noted. Patient stable for transfer. Post anesthesia nausea and vomiting:  none      Vitals Value Taken Time   /63 9/5/2019  8:25 PM   Temp     Pulse 103 9/5/2019  8:29 PM   Resp 13 9/5/2019  8:29 PM   SpO2 98 % 9/5/2019  8:29 PM   Vitals shown include unvalidated device data.

## 2019-09-06 NOTE — PERIOP NOTES
Dr. Tc Baig informed of patients LOC change. Pt. Able to lift head for 5 seconds, strong  and pushes. Responding to verbal commands. Dr. Evita Cho at [facility] for reassessment advised extubation, same was done. No complications. Oral cavity suctioned.

## 2019-09-07 LAB
ERYTHROCYTE [DISTWIDTH] IN BLOOD BY AUTOMATED COUNT: 18.4 % (ref 11.5–14.5)
GLUCOSE BLD STRIP.AUTO-MCNC: 132 MG/DL (ref 65–100)
GLUCOSE BLD STRIP.AUTO-MCNC: 132 MG/DL (ref 65–100)
GLUCOSE BLD STRIP.AUTO-MCNC: 219 MG/DL (ref 65–100)
GLUCOSE BLD STRIP.AUTO-MCNC: 250 MG/DL (ref 65–100)
HCT VFR BLD AUTO: 24.3 % (ref 35–47)
HGB BLD-MCNC: 6.7 G/DL (ref 11.5–16)
MCH RBC QN AUTO: 22 PG (ref 26–34)
MCHC RBC AUTO-ENTMCNC: 27.6 G/DL (ref 30–36.5)
MCV RBC AUTO: 79.9 FL (ref 80–99)
NRBC # BLD: 0.03 K/UL (ref 0–0.01)
NRBC BLD-RTO: 0.2 PER 100 WBC
PLATELET # BLD AUTO: 389 K/UL (ref 150–400)
PMV BLD AUTO: 9.2 FL (ref 8.9–12.9)
RBC # BLD AUTO: 3.04 M/UL (ref 3.8–5.2)
SERVICE CMNT-IMP: ABNORMAL
WBC # BLD AUTO: 17.1 K/UL (ref 3.6–11)

## 2019-09-07 PROCEDURE — 77030038269 HC DRN EXT URIN PURWCK BARD -A

## 2019-09-07 PROCEDURE — 74011250637 HC RX REV CODE- 250/637

## 2019-09-07 PROCEDURE — 97116 GAIT TRAINING THERAPY: CPT

## 2019-09-07 PROCEDURE — 97162 PT EVAL MOD COMPLEX 30 MIN: CPT

## 2019-09-07 PROCEDURE — 97530 THERAPEUTIC ACTIVITIES: CPT

## 2019-09-07 PROCEDURE — 85027 COMPLETE CBC AUTOMATED: CPT

## 2019-09-07 PROCEDURE — 74011000250 HC RX REV CODE- 250

## 2019-09-07 PROCEDURE — 65270000029 HC RM PRIVATE

## 2019-09-07 PROCEDURE — 94640 AIRWAY INHALATION TREATMENT: CPT

## 2019-09-07 PROCEDURE — 77010033678 HC OXYGEN DAILY

## 2019-09-07 PROCEDURE — 74011250636 HC RX REV CODE- 250/636

## 2019-09-07 PROCEDURE — 97165 OT EVAL LOW COMPLEX 30 MIN: CPT

## 2019-09-07 PROCEDURE — 74011636637 HC RX REV CODE- 636/637

## 2019-09-07 PROCEDURE — 97535 SELF CARE MNGMENT TRAINING: CPT

## 2019-09-07 PROCEDURE — 82962 GLUCOSE BLOOD TEST: CPT

## 2019-09-07 PROCEDURE — 94760 N-INVAS EAR/PLS OXIMETRY 1: CPT

## 2019-09-07 PROCEDURE — 36415 COLL VENOUS BLD VENIPUNCTURE: CPT

## 2019-09-07 RX ORDER — HYDROMORPHONE HYDROCHLORIDE 1 MG/ML
1 INJECTION, SOLUTION INTRAMUSCULAR; INTRAVENOUS; SUBCUTANEOUS
Status: DISCONTINUED | OUTPATIENT
Start: 2019-09-07 | End: 2019-09-09 | Stop reason: HOSPADM

## 2019-09-07 RX ADMIN — MONTELUKAST SODIUM 10 MG: 10 TABLET, COATED ORAL at 10:26

## 2019-09-07 RX ADMIN — PANTOPRAZOLE SODIUM 40 MG: 40 TABLET, DELAYED RELEASE ORAL at 05:48

## 2019-09-07 RX ADMIN — MIRTAZAPINE 15 MG: 15 TABLET, FILM COATED ORAL at 21:37

## 2019-09-07 RX ADMIN — SAXAGLIPTIN 5 MG: 5 TABLET, FILM COATED ORAL at 11:15

## 2019-09-07 RX ADMIN — MAGNESIUM OXIDE TAB 400 MG (241.3 MG ELEMENTAL MG) 400 MG: 400 (241.3 MG) TAB at 21:37

## 2019-09-07 RX ADMIN — HYDROMORPHONE HYDROCHLORIDE 1 MG: 1 INJECTION, SOLUTION INTRAMUSCULAR; INTRAVENOUS; SUBCUTANEOUS at 22:37

## 2019-09-07 RX ADMIN — Medication 10 ML: at 13:54

## 2019-09-07 RX ADMIN — PANTOPRAZOLE SODIUM 40 MG: 40 TABLET, DELAYED RELEASE ORAL at 16:00

## 2019-09-07 RX ADMIN — AZELASTINE HYDROCHLORIDE 2 SPRAY: 137 SPRAY, METERED NASAL at 17:05

## 2019-09-07 RX ADMIN — IPRATROPIUM BROMIDE 0.5 MG: 0.5 SOLUTION RESPIRATORY (INHALATION) at 07:34

## 2019-09-07 RX ADMIN — HYDROMORPHONE HYDROCHLORIDE 1 MG: 2 INJECTION INTRAMUSCULAR; INTRAVENOUS; SUBCUTANEOUS at 08:52

## 2019-09-07 RX ADMIN — IPRATROPIUM BROMIDE 0.5 MG: 0.5 SOLUTION RESPIRATORY (INHALATION) at 14:00

## 2019-09-07 RX ADMIN — AZELASTINE HYDROCHLORIDE 2 SPRAY: 137 SPRAY, METERED NASAL at 10:27

## 2019-09-07 RX ADMIN — APIXABAN 5 MG: 5 TABLET, FILM COATED ORAL at 10:26

## 2019-09-07 RX ADMIN — DULOXETINE HYDROCHLORIDE 60 MG: 30 CAPSULE, DELAYED RELEASE ORAL at 10:26

## 2019-09-07 RX ADMIN — IPRATROPIUM BROMIDE 0.5 MG: 0.5 SOLUTION RESPIRATORY (INHALATION) at 20:06

## 2019-09-07 RX ADMIN — CLOPIDOGREL BISULFATE 75 MG: 75 TABLET ORAL at 21:37

## 2019-09-07 RX ADMIN — GLIPIZIDE 2.5 MG: 2.5 TABLET, FILM COATED, EXTENDED RELEASE ORAL at 21:38

## 2019-09-07 RX ADMIN — INSULIN HUMAN 2 UNITS: 100 INJECTION, SOLUTION PARENTERAL at 22:47

## 2019-09-07 RX ADMIN — FLUTICASONE PROPIONATE 2 SPRAY: 50 SPRAY, METERED NASAL at 17:05

## 2019-09-07 RX ADMIN — INSULIN HUMAN 5 UNITS: 100 INJECTION, SOLUTION PARENTERAL at 17:05

## 2019-09-07 RX ADMIN — POTASSIUM CHLORIDE 10 MEQ: 750 TABLET, FILM COATED, EXTENDED RELEASE ORAL at 10:27

## 2019-09-07 RX ADMIN — PREDNISONE 10 MG: 10 TABLET ORAL at 10:26

## 2019-09-07 RX ADMIN — POTASSIUM CHLORIDE 10 MEQ: 750 TABLET, FILM COATED, EXTENDED RELEASE ORAL at 17:05

## 2019-09-07 RX ADMIN — Medication 10 ML: at 05:47

## 2019-09-07 RX ADMIN — HYDROMORPHONE HYDROCHLORIDE 1 MG: 1 INJECTION, SOLUTION INTRAMUSCULAR; INTRAVENOUS; SUBCUTANEOUS at 13:52

## 2019-09-07 RX ADMIN — APIXABAN 5 MG: 5 TABLET, FILM COATED ORAL at 17:05

## 2019-09-07 RX ADMIN — PREDNISONE 10 MG: 10 TABLET ORAL at 16:01

## 2019-09-07 RX ADMIN — VITAMIN D, TAB 1000IU (100/BT) 2000 UNITS: 25 TAB at 10:25

## 2019-09-07 RX ADMIN — FLUTICASONE PROPIONATE 2 SPRAY: 50 SPRAY, METERED NASAL at 10:27

## 2019-09-07 NOTE — PROGRESS NOTES
Paged Dr. Mario Alberto Almanza about SS insulin and scanned urine since emir removed at 1100 346 ml. Ordered IVF NSS at 75 ml/ hr and  SS insulin . Dressings to Left abdomen primapor dry and intact. Dressing to lower left leg dry and intact primapor. Doppler positive pedal pulses.

## 2019-09-07 NOTE — PROGRESS NOTES
Problem: Mobility Impaired (Adult and Pediatric)  Goal: *Acute Goals and Plan of Care (Insert Text)  Description  FUNCTIONAL STATUS PRIOR TO ADMISSION: Patient was modified independent using a Rollator and Rolling walker for functional mobility and still driving    HOME [address]: [facility], but did not require assist.    Physical Therapy Goals  Initiated 9/7/2019  1. Patient will move from supine to sit and sit to supine  in bed with modified independence within 7 day(s). 2.  Patient will transfer from bed to chair and chair to bed with supervision/set-up using the least restrictive device within 7 day(s). 3.  Patient will perform sit to stand with supervision/set-up within 7 day(s). 4.  Patient will ambulate with minimal assistance/contact guard assist for 150 feet with the least restrictive device within 7 day(s). 9/7/2019 1349 by Keri Alfaro, PT  Outcome: Progressing Towards Goal  9/7/2019 1348 by Keri Alfaro, PT  Outcome: Progressing Towards Goal   PHYSICAL THERAPY EVALUATION  Patient: Iron Thayer (54 y.o. female)  Date: 9/7/2019  Primary Diagnosis: ASO (arteriosclerosis obliterans) [I70.90]  Procedure(s) (LRB):  LEFT FEMORAL-POPLITEAL BYPASS WITH PTFE (Left) 2 Days Post-Op   Precautions:   Contact, Fall(MRSA)      ASSESSMENT  Based on the objective data described below, the patient presents with post op day# 2 from L fem-pop due to arterioscloisis obliterans. Pt lives at [facility] and was Mod independent PTA using rollator and RW as well as still driving. Pt pmhx includes CHF/COPD/DM/DVT/T7 comp fx and RUE cellulitis with digit amputation due to gangrene. Pt received supine incontinent of urine in bed, yet A&O. Able to roll for bed changing and assist with hygenic care via bridging. Overall weakness present post op with hgh of 6.6.   Despite RLE post op pain pt very motivated to participate needing overall Mod A with all. Tolerated gait of 3' to [facility] with RW. Returned to supine with excellent O2 sats on room air in high 90's and positioned to promoted healing. RN notified of drainage from L fem-pop dsg and agreeing to address. Will be excellent rehab candidate. Current Level of Function Impacting Discharge (mobility/balance): Mod Ax2/ good to fair    Functional Outcome Measure: The patient scored 45/100 on the Barthel outcome measure which is indicative of 40-59% functional impairment. Other factors to consider for discharge: will need assistance and rehab     Patient will benefit from skilled therapy intervention to address the above noted impairments. PLAN :  Recommendations and Planned Interventions: bed mobility training, transfer training, gait training, therapeutic exercises, patient and family training/education and therapeutic activities      Frequency/Duration: Patient will be followed by physical therapy:  5 times a week to address goals. Recommendation for discharge: (in order for the patient to meet his/her long term goals)  Therapy up to 5 days/week in SNF setting    This discharge recommendation:  Has been made in collaboration with the attending provider and/or case management    Equipment recommendations for successful discharge (if) home: has  all RW and rollator         SUBJECTIVE:   Patient stated I want rehab at [facility].     OBJECTIVE DATA SUMMARY:   HISTORY:    Past Medical History:   Diagnosis Date    Asthma     Atrial fibrillation ([address] 11/16/2018    Autoimmune disease ([address]     fibromyalgia    CAD (coronary artery disease) 10/9/2015    Closed wedge compression fracture of T7 vertebra ([address] 11/13/2018    Diabetes ([address]     DVT (deep vein thrombosis) in pregnancy ([address]     GERD (gastroesophageal reflux disease)     Heart failure (HCC)     HTN (hypertension)     NSTEMI (non-ST elevated myocardial infarction) ([address] 10/9/2015 Obesity (BMI 30-39.9) 2018    Sleep apnea     wears CPAP     Past Surgical History:   Procedure Laterality Date    ABDOMEN SURGERY PROC UNLISTED      hital hernia repair, gall bladder removed    AMPUTATION TRANSMETACARPAL Right 2019    entire ring finger, 2nd & 3rd fingers (transmetacarpal)    BREAST SURGERY PROCEDURE UNLISTED      lumpectomy    CARDIAC SURG PROCEDURE UNLIST  10/9/15    2 stents    HX  SECTION      HX COLOSTOMY      and reversal, post episiotomy repair    HX HYSTERECTOMY  1970    HX UROLOGICAL  2009    bladder sling       Personal factors and/or comorbidities impacting plan of care: see above and below    Home Situation  Home Environment: Independent living  # Steps to Enter: 0  One/Two Story Residence: One story  Living Alone: Yes  Support Systems: Family member(s)  Patient Expects to be Discharged to[de-identified] Private residence  Current DME Used/Available at Home: [place], rollator, Grab bars, Shower chair, Walker, rolling  Tub or Shower Type: Shower    EXAMINATION/PRESENTATION/DECISION MAKING:   Critical Behavior:  Neurologic State: Alert  Orientation Level: Oriented X4  Cognition: Follows commands  Safety/Judgement: Awareness of environment, Fall prevention, Insight into deficits  Hearing: Auditory  Auditory Impairment: None  Hearing Aids/Status: Does not own  Skin:  IV; dsg L medial thigh;  Edema: BUE/LE's  Range Of Motion:  AROM: Generally decreased, functional                       Strength:    Strength: Generally decreased, functional                    Tone & Sensation:   Tone: Normal              Sensation: Impaired(LLE)               Coordination:  Coordination: Generally decreased, functional  Vision:      Functional Mobility:  Bed Mobility:  Rolling: Contact guard assistance  Supine to Sit: Moderate assistance;Assist x2  Sit to Supine: Moderate assistance;Assist x2  Scooting: Minimum assistance  Transfers:  Sit to Stand:  Moderate assistance;Assist x2  Stand to Sit: Moderate assistance;Assist x2                       Balance:   Sitting: Intact  Standing: Impaired  Standing - Static: Constant support;Good  Standing - Dynamic : Constant support; Fair  Ambulation/Gait Training:  Distance (ft): 3 Feet (ft)(side stepping to [facility])  Assistive Device: Walker, rolling;Gait belt  Ambulation - Level of Assistance: Moderate assistance;Assist x2        Gait Abnormalities: Decreased step clearance; Antalgic        Base of Support: Widened     Speed/Se: Slow;Pace decreased (<100 feet/min)  Step Length: Right shortened;Left shortened                           Therapeutic Exercises: Ankle pumping and spirometry    Functional Measure:  Barthel Index:    Bathin  Bladder: 5  Bowels: 10  Groomin  Dressin  Feeding: 10  Mobility: 0  Stairs: 0  Toilet Use: 5  Transfer (Bed to Chair and Back): 5  Total: 45/100       The Barthel ADL Index: Guidelines  1. The index should be used as a record of what a patient does, not as a record of what a patient could do. 2. The main aim is to establish degree of independence from any help, physical or verbal, however minor and for whatever reason. 3. The need for supervision renders the patient not independent. 4. A patient's performance should be established using the best available evidence. Asking the patient, friends/relatives and nurses are the usual sources, but direct observation and common sense are also important. However direct testing is not needed. 5. Usually the patient's performance over the preceding 24-48 hours is important, but occasionally longer periods will be relevant. 6. Middle categories imply that the patient supplies over 50 per cent of the effort. 7. Use of aids to be independent is allowed. Lin Dowd., Barthel, D.W. (6676). Functional evaluation: the Barthel Index. [facility] (14)2. DAMI Luna, Anupama Sorenson., Magui Chowdhury., [place], [address] [place], [place] [address] ().  Measuring the change indisability after inpatient rehabilitation; comparison of the responsiveness of the Barthel Index and Functional [place] Measure. Journal of Neurology, Neurosurgery, and Psychiatry, 66(4), 511-071. АЛЕКСАНДР Seaman.ELIZ, SANIYA Parry, & Oliva Vu M.A. (2004.) Assessment of post-stroke quality of life in cost-effectiveness studies: The usefulness of the Barthel Index and the EuroQoL-5D. Quality of Life Research, 15, 197-25           Physical Therapy Evaluation Charge Determination   History Examination Presentation Decision-Making   HIGH Complexity :3+ comorbidities / personal factors will impact the outcome/ POC  MEDIUM Complexity : 3 Standardized tests and measures addressing body structure, function, activity limitation and / or participation in recreation  MEDIUM Complexity : Evolving with changing characteristics  Other outcome measures barthel  MEDIUM      Based on the above components, the patient evaluation is determined to be of the following complexity level: MEDIUM    Pain Rating:  Significant pain LLE    Activity Tolerance:   Fair  Please refer to the flowsheet for vital signs taken during this treatment. After treatment patient left in no apparent distress:   Supine in bed, Call bell within reach and Bed / chair alarm activated    COMMUNICATION/EDUCATION:   The patients plan of care was discussed with: Occupational Therapist and Registered Nurse. Fall prevention education was provided and the patient/caregiver indicated understanding., Patient/family have participated as able in goal setting and plan of care. and Patient/family agree to work toward stated goals and plan of care.     Thank you for this referral.  Evelyn Garcia, PT   Time Calculation: 40 mins

## 2019-09-07 NOTE — PROGRESS NOTES
Confirmed with MD Rodriguez to give evening dose of Eliquis. Patient is on BID 10mg dose for PVD since May and is now 2 days post op left fem-pop graft. AM Hgb was 6.7, down from yesterday. Wanted to confirm with surgeon prior to administering dose. Per Dr. Lisa Cota, ok to give dose and make sure there is a CBC scheduled for tomorrow morning. Order for CBC placed. Eliquis administered per order.

## 2019-09-07 NOTE — PROGRESS NOTES
Bedside and Verbal shift change report given to Radha Craig (oncoming nurse) by Swati Aguilera RN (offgoing nurse). Report included the following information SBAR, Kardex, ED Summary, Procedure Summary, Intake/Output, MAR and Recent Results.

## 2019-09-07 NOTE — PROGRESS NOTES
Bedside and Verbal shift change report given to Cintia Leung RN (oncoming nurse) by Tammie Desouza RN (offgoing nurse). Report included the following information SBAR and Kardex.

## 2019-09-08 LAB
ERYTHROCYTE [DISTWIDTH] IN BLOOD BY AUTOMATED COUNT: 18.7 % (ref 11.5–14.5)
GLUCOSE BLD STRIP.AUTO-MCNC: 122 MG/DL (ref 65–100)
GLUCOSE BLD STRIP.AUTO-MCNC: 140 MG/DL (ref 65–100)
GLUCOSE BLD STRIP.AUTO-MCNC: 154 MG/DL (ref 65–100)
GLUCOSE BLD STRIP.AUTO-MCNC: 70 MG/DL (ref 65–100)
GLUCOSE BLD STRIP.AUTO-MCNC: 71 MG/DL (ref 65–100)
GLUCOSE BLD STRIP.AUTO-MCNC: 97 MG/DL (ref 65–100)
HCT VFR BLD AUTO: 23.3 % (ref 35–47)
HCT VFR BLD AUTO: 30.1 % (ref 35–47)
HGB BLD-MCNC: 6.5 G/DL (ref 11.5–16)
HGB BLD-MCNC: 8.8 G/DL (ref 11.5–16)
MCH RBC QN AUTO: 22.5 PG (ref 26–34)
MCHC RBC AUTO-ENTMCNC: 27.9 G/DL (ref 30–36.5)
MCV RBC AUTO: 80.6 FL (ref 80–99)
NRBC # BLD: 0.02 K/UL (ref 0–0.01)
NRBC BLD-RTO: 0.1 PER 100 WBC
PLATELET # BLD AUTO: 371 K/UL (ref 150–400)
PMV BLD AUTO: 9.1 FL (ref 8.9–12.9)
RBC # BLD AUTO: 2.89 M/UL (ref 3.8–5.2)
SERVICE CMNT-IMP: ABNORMAL
SERVICE CMNT-IMP: NORMAL
WBC # BLD AUTO: 14.6 K/UL (ref 3.6–11)

## 2019-09-08 PROCEDURE — 74011250636 HC RX REV CODE- 250/636

## 2019-09-08 PROCEDURE — 36415 COLL VENOUS BLD VENIPUNCTURE: CPT

## 2019-09-08 PROCEDURE — 77010033678 HC OXYGEN DAILY

## 2019-09-08 PROCEDURE — 36430 TRANSFUSION BLD/BLD COMPNT: CPT

## 2019-09-08 PROCEDURE — 85018 HEMOGLOBIN: CPT

## 2019-09-08 PROCEDURE — 74011250637 HC RX REV CODE- 250/637

## 2019-09-08 PROCEDURE — 30233N1 TRANSFUSION OF NONAUTOLOGOUS RED BLOOD CELLS INTO PERIPHERAL VEIN, PERCUTANEOUS APPROACH: ICD-10-PCS

## 2019-09-08 PROCEDURE — P9016 RBC LEUKOCYTES REDUCED: HCPCS

## 2019-09-08 PROCEDURE — 94640 AIRWAY INHALATION TREATMENT: CPT

## 2019-09-08 PROCEDURE — 74011000250 HC RX REV CODE- 250

## 2019-09-08 PROCEDURE — 74011636637 HC RX REV CODE- 636/637

## 2019-09-08 PROCEDURE — 65270000029 HC RM PRIVATE

## 2019-09-08 PROCEDURE — 82962 GLUCOSE BLOOD TEST: CPT

## 2019-09-08 PROCEDURE — 94760 N-INVAS EAR/PLS OXIMETRY 1: CPT

## 2019-09-08 PROCEDURE — 77030038269 HC DRN EXT URIN PURWCK BARD -A

## 2019-09-08 PROCEDURE — 85027 COMPLETE CBC AUTOMATED: CPT

## 2019-09-08 RX ORDER — SODIUM CHLORIDE 9 MG/ML
250 INJECTION, SOLUTION INTRAVENOUS AS NEEDED
Status: DISCONTINUED | OUTPATIENT
Start: 2019-09-08 | End: 2019-09-09 | Stop reason: HOSPADM

## 2019-09-08 RX ADMIN — FLUTICASONE PROPIONATE 2 SPRAY: 50 SPRAY, METERED NASAL at 08:22

## 2019-09-08 RX ADMIN — APIXABAN 5 MG: 5 TABLET, FILM COATED ORAL at 09:56

## 2019-09-08 RX ADMIN — POTASSIUM CHLORIDE 10 MEQ: 750 TABLET, FILM COATED, EXTENDED RELEASE ORAL at 08:19

## 2019-09-08 RX ADMIN — Medication 10 ML: at 05:39

## 2019-09-08 RX ADMIN — MAGNESIUM OXIDE TAB 400 MG (241.3 MG ELEMENTAL MG) 400 MG: 400 (241.3 MG) TAB at 21:24

## 2019-09-08 RX ADMIN — AZELASTINE HYDROCHLORIDE 2 SPRAY: 137 SPRAY, METERED NASAL at 17:18

## 2019-09-08 RX ADMIN — FLUTICASONE PROPIONATE 2 SPRAY: 50 SPRAY, METERED NASAL at 17:18

## 2019-09-08 RX ADMIN — PREDNISONE 10 MG: 10 TABLET ORAL at 17:16

## 2019-09-08 RX ADMIN — APIXABAN 5 MG: 5 TABLET, FILM COATED ORAL at 17:16

## 2019-09-08 RX ADMIN — Medication 10 ML: at 21:24

## 2019-09-08 RX ADMIN — MONTELUKAST SODIUM 10 MG: 10 TABLET, COATED ORAL at 08:18

## 2019-09-08 RX ADMIN — PANTOPRAZOLE SODIUM 40 MG: 40 TABLET, DELAYED RELEASE ORAL at 17:16

## 2019-09-08 RX ADMIN — IPRATROPIUM BROMIDE 0.5 MG: 0.5 SOLUTION RESPIRATORY (INHALATION) at 19:47

## 2019-09-08 RX ADMIN — IPRATROPIUM BROMIDE 0.5 MG: 0.5 SOLUTION RESPIRATORY (INHALATION) at 07:23

## 2019-09-08 RX ADMIN — HYDROMORPHONE HYDROCHLORIDE 1 MG: 1 INJECTION, SOLUTION INTRAMUSCULAR; INTRAVENOUS; SUBCUTANEOUS at 18:55

## 2019-09-08 RX ADMIN — MIRTAZAPINE 15 MG: 15 TABLET, FILM COATED ORAL at 21:24

## 2019-09-08 RX ADMIN — TRAMADOL HYDROCHLORIDE 50 MG: 50 TABLET, FILM COATED ORAL at 09:28

## 2019-09-08 RX ADMIN — VITAMIN D, TAB 1000IU (100/BT) 2000 UNITS: 25 TAB at 08:19

## 2019-09-08 RX ADMIN — HYDROMORPHONE HYDROCHLORIDE 1 MG: 1 INJECTION, SOLUTION INTRAMUSCULAR; INTRAVENOUS; SUBCUTANEOUS at 09:56

## 2019-09-08 RX ADMIN — Medication 10 ML: at 17:16

## 2019-09-08 RX ADMIN — DULOXETINE HYDROCHLORIDE 60 MG: 30 CAPSULE, DELAYED RELEASE ORAL at 08:18

## 2019-09-08 RX ADMIN — POTASSIUM CHLORIDE 10 MEQ: 750 TABLET, FILM COATED, EXTENDED RELEASE ORAL at 17:15

## 2019-09-08 RX ADMIN — PREDNISONE 10 MG: 10 TABLET ORAL at 07:42

## 2019-09-08 RX ADMIN — PANTOPRAZOLE SODIUM 40 MG: 40 TABLET, DELAYED RELEASE ORAL at 07:42

## 2019-09-08 RX ADMIN — INSULIN HUMAN 2 UNITS: 100 INJECTION, SOLUTION PARENTERAL at 17:15

## 2019-09-08 RX ADMIN — GLIPIZIDE 2.5 MG: 2.5 TABLET, FILM COATED, EXTENDED RELEASE ORAL at 21:24

## 2019-09-08 RX ADMIN — Medication 16 G: at 07:40

## 2019-09-08 RX ADMIN — IPRATROPIUM BROMIDE 0.5 MG: 0.5 SOLUTION RESPIRATORY (INHALATION) at 13:17

## 2019-09-08 RX ADMIN — AZELASTINE HYDROCHLORIDE 2 SPRAY: 137 SPRAY, METERED NASAL at 08:22

## 2019-09-08 RX ADMIN — SAXAGLIPTIN 5 MG: 5 TABLET, FILM COATED ORAL at 08:20

## 2019-09-08 RX ADMIN — CLOPIDOGREL BISULFATE 75 MG: 75 TABLET ORAL at 21:24

## 2019-09-08 NOTE — PROGRESS NOTES
8197 Patient BG 71, patient is asymptomaatic, rechecked after orange juice 70, Glucose tablet given per order. BG 97. Paged Dr Arnulfo Blake about low blood glucose and HGB 6.5. Report given to Devyn Gold RN.

## 2019-09-08 NOTE — PROGRESS NOTES
Bedside and Verbal shift change report given to Naima Paredes RN (oncoming nurse) by Cecilia Leija RN (offgoing nurse). Report included the following information SBAR, Kardex, OR Summary, Procedure Summary, Intake/Output, MAR and Recent Results.

## 2019-09-08 NOTE — PROGRESS NOTES
MD notified of HGB 6.5. Ordered to transfuse 2 units:    0815 Per Dr. Lesly Ness, ok to administer AM Eliquis, but transfuse 2 units RBC's.  0830 Order placed. Type and crossmatch is current per Blood Bank but expires at midnight. 9238 Blood is ready per blood bank. 3145 Consent not in chart. Awaiting Dr. Lesly Ness for consent. (Dr. Lesly Ness will arrive this morning per phonecall.) Consent form is in room. 7289 Education given  [phone] Consent signed. 1208 Transfusion started at 75mL/hr  1223 Transfusion rate increased to 125/hr  1450 Transfusion complete (no reaction suspected) Pink slip sent to lab and White slip placed in chart. 1636 2nd unit requested  1650 2nd unit started at 75 mL/hr  1705 transfusion Rate increased to 125 mL/hr  1925 2nd Transfusion complete. (no reaction suspected. Pink slip sent to lab. White slip placed in chart.     **Next shift notified to obtain one hour post transfusion vitals and two hour post transfusion H&H

## 2019-09-08 NOTE — PROGRESS NOTES
Doing well  Hgb a little low  Will transfuse a couple units  Increase activity   Rehab hopefully early week

## 2019-09-08 NOTE — PROGRESS NOTES
Physical Therapy Note:    PT treatment deferred. Pt declining participation at time of attempt. Additionally, pt with tachycardia, Hgb 6.5 g/dL, and pending transfusion 2 units PRBCs per RN. Will continue to follow per POC.     Deion Holt, PT, DPT, Mackenzie Sebastian

## 2019-09-09 VITALS
DIASTOLIC BLOOD PRESSURE: 72 MMHG | TEMPERATURE: 98.8 F | OXYGEN SATURATION: 100 % | RESPIRATION RATE: 20 BRPM | BODY MASS INDEX: 37.67 KG/M2 | HEART RATE: 86 BPM | HEIGHT: 67 IN | SYSTOLIC BLOOD PRESSURE: 162 MMHG | WEIGHT: 240 LBS

## 2019-09-09 LAB
ABO + RH BLD: NORMAL
BLD PROD TYP BPU: NORMAL
BLOOD GROUP ANTIBODIES SERPL: NORMAL
BPU ID: NORMAL
CROSSMATCH RESULT,%XM: NORMAL
ERYTHROCYTE [DISTWIDTH] IN BLOOD BY AUTOMATED COUNT: 18.5 % (ref 11.5–14.5)
GLUCOSE BLD STRIP.AUTO-MCNC: 102 MG/DL (ref 65–100)
GLUCOSE BLD STRIP.AUTO-MCNC: 61 MG/DL (ref 65–100)
GLUCOSE BLD STRIP.AUTO-MCNC: 62 MG/DL (ref 65–100)
GLUCOSE BLD STRIP.AUTO-MCNC: 88 MG/DL (ref 65–100)
GLUCOSE BLD STRIP.AUTO-MCNC: 89 MG/DL (ref 65–100)
HCT VFR BLD AUTO: 32.4 % (ref 35–47)
HGB BLD-MCNC: 9.3 G/DL (ref 11.5–16)
MCH RBC QN AUTO: 23.7 PG (ref 26–34)
MCHC RBC AUTO-ENTMCNC: 28.7 G/DL (ref 30–36.5)
MCV RBC AUTO: 82.4 FL (ref 80–99)
NRBC # BLD: 0.03 K/UL (ref 0–0.01)
NRBC BLD-RTO: 0.2 PER 100 WBC
PLATELET # BLD AUTO: 383 K/UL (ref 150–400)
PMV BLD AUTO: 9.3 FL (ref 8.9–12.9)
RBC # BLD AUTO: 3.93 M/UL (ref 3.8–5.2)
SERVICE CMNT-IMP: ABNORMAL
SERVICE CMNT-IMP: NORMAL
SERVICE CMNT-IMP: NORMAL
SPECIMEN EXP DATE BLD: NORMAL
STATUS OF UNIT,%ST: NORMAL
UNIT DIVISION, %UDIV: 0
WBC # BLD AUTO: 12.1 K/UL (ref 3.6–11)

## 2019-09-09 PROCEDURE — 85027 COMPLETE CBC AUTOMATED: CPT

## 2019-09-09 PROCEDURE — 74011250636 HC RX REV CODE- 250/636

## 2019-09-09 PROCEDURE — 77030038269 HC DRN EXT URIN PURWCK BARD -A

## 2019-09-09 PROCEDURE — 74011636637 HC RX REV CODE- 636/637

## 2019-09-09 PROCEDURE — 74011250637 HC RX REV CODE- 250/637

## 2019-09-09 PROCEDURE — 97530 THERAPEUTIC ACTIVITIES: CPT

## 2019-09-09 PROCEDURE — 97535 SELF CARE MNGMENT TRAINING: CPT

## 2019-09-09 PROCEDURE — 82962 GLUCOSE BLOOD TEST: CPT

## 2019-09-09 PROCEDURE — 94640 AIRWAY INHALATION TREATMENT: CPT

## 2019-09-09 PROCEDURE — 36415 COLL VENOUS BLD VENIPUNCTURE: CPT

## 2019-09-09 PROCEDURE — 97116 GAIT TRAINING THERAPY: CPT

## 2019-09-09 PROCEDURE — 74011000250 HC RX REV CODE- 250

## 2019-09-09 RX ADMIN — IPRATROPIUM BROMIDE 0.5 MG: 0.5 SOLUTION RESPIRATORY (INHALATION) at 14:15

## 2019-09-09 RX ADMIN — PREDNISONE 10 MG: 10 TABLET ORAL at 10:07

## 2019-09-09 RX ADMIN — POTASSIUM CHLORIDE 10 MEQ: 750 TABLET, FILM COATED, EXTENDED RELEASE ORAL at 10:07

## 2019-09-09 RX ADMIN — METOPROLOL TARTRATE 25 MG: 25 TABLET, FILM COATED ORAL at 06:48

## 2019-09-09 RX ADMIN — HYDROMORPHONE HYDROCHLORIDE 1 MG: 1 INJECTION, SOLUTION INTRAMUSCULAR; INTRAVENOUS; SUBCUTANEOUS at 05:55

## 2019-09-09 RX ADMIN — Medication 16 G: at 07:00

## 2019-09-09 RX ADMIN — FLUTICASONE PROPIONATE 2 SPRAY: 50 SPRAY, METERED NASAL at 17:22

## 2019-09-09 RX ADMIN — AZELASTINE HYDROCHLORIDE 2 SPRAY: 137 SPRAY, METERED NASAL at 10:07

## 2019-09-09 RX ADMIN — MONTELUKAST SODIUM 10 MG: 10 TABLET, COATED ORAL at 10:07

## 2019-09-09 RX ADMIN — SODIUM CHLORIDE 75 ML/HR: 900 INJECTION, SOLUTION INTRAVENOUS at 05:18

## 2019-09-09 RX ADMIN — POTASSIUM CHLORIDE 10 MEQ: 750 TABLET, FILM COATED, EXTENDED RELEASE ORAL at 17:20

## 2019-09-09 RX ADMIN — PANTOPRAZOLE SODIUM 40 MG: 40 TABLET, DELAYED RELEASE ORAL at 17:20

## 2019-09-09 RX ADMIN — IPRATROPIUM BROMIDE 0.5 MG: 0.5 SOLUTION RESPIRATORY (INHALATION) at 07:35

## 2019-09-09 RX ADMIN — FLUTICASONE PROPIONATE 2 SPRAY: 50 SPRAY, METERED NASAL at 10:08

## 2019-09-09 RX ADMIN — APIXABAN 5 MG: 5 TABLET, FILM COATED ORAL at 17:19

## 2019-09-09 RX ADMIN — PREDNISONE 10 MG: 10 TABLET ORAL at 17:20

## 2019-09-09 RX ADMIN — Medication 10 ML: at 05:17

## 2019-09-09 RX ADMIN — TRAMADOL HYDROCHLORIDE 50 MG: 50 TABLET, FILM COATED ORAL at 05:17

## 2019-09-09 RX ADMIN — AZELASTINE HYDROCHLORIDE 2 SPRAY: 137 SPRAY, METERED NASAL at 17:22

## 2019-09-09 RX ADMIN — VITAMIN D, TAB 1000IU (100/BT) 2000 UNITS: 25 TAB at 10:07

## 2019-09-09 RX ADMIN — APIXABAN 5 MG: 5 TABLET, FILM COATED ORAL at 10:07

## 2019-09-09 RX ADMIN — DULOXETINE HYDROCHLORIDE 60 MG: 30 CAPSULE, DELAYED RELEASE ORAL at 10:07

## 2019-09-09 RX ADMIN — PANTOPRAZOLE SODIUM 40 MG: 40 TABLET, DELAYED RELEASE ORAL at 05:17

## 2019-09-09 RX ADMIN — SAXAGLIPTIN 5 MG: 5 TABLET, FILM COATED ORAL at 10:14

## 2019-09-09 NOTE — PROGRESS NOTES
Problem: Risk for Spread of Infection  Goal: Prevent transmission of infectious organism to others  Description  Prevent the transmission of infectious organisms to other patients, staff members, and visitors. Outcome: Progressing Towards Goal     Problem: Patient Education:  Go to Education Activity  Goal: Patient/Family Education  Outcome: Progressing Towards Goal     Problem: Falls - Risk of  Goal: *Absence of Falls  Description  Document Juan Jin Fall Risk and appropriate interventions in the flowsheet. Outcome: Progressing Towards Goal  Note:   Fall Risk Interventions:  Mobility Interventions: Bed/chair exit alarm, Communicate number of staff needed for ambulation/transfer, Patient to call before getting OOB, PT Consult for mobility concerns         Medication Interventions: Assess postural VS orthostatic hypotension, Bed/chair exit alarm, Patient to call before getting OOB, Teach patient to arise slowly, Utilize gait belt for transfers/ambulation    Elimination Interventions: Bed/chair exit alarm, Call light in reach, Patient to call for help with toileting needs, Stay With Me (per policy), Toilet paper/wipes in reach, Toileting schedule/hourly rounds    History of Falls Interventions: Bed/chair exit alarm, Utilize gait belt for transfer/ambulation         Problem: Patient Education: Go to Patient Education Activity  Goal: Patient/Family Education  Outcome: Progressing Towards Goal     Problem: Pressure Injury - Risk of  Goal: *Prevention of pressure injury  Description  Document Darrion Scale and appropriate interventions in the flowsheet. Outcome: Progressing Towards Goal  Note:   Pressure Injury Interventions:  Sensory Interventions: Turn and reposition approx.  every two hours (pillows and wedges if needed)    Moisture Interventions: Absorbent underpads    Activity Interventions: Increase time out of bed    Mobility Interventions: HOB 30 degrees or less    Nutrition Interventions: Offer support with meals,snacks and hydration    Friction and Shear Interventions: HOB 30 degrees or less, Minimize layers                Problem: Patient Education: Go to Patient Education Activity  Goal: Patient/Family Education  Outcome: Progressing Towards Goal     Problem: Patient Education: Go to Patient Education Activity  Goal: Patient/Family Education  Outcome: Progressing Towards Goal     Problem: Patient Education: Go to Patient Education Activity  Goal: Patient/Family Education  Outcome: Progressing Towards Goal     Problem: Patient Education: Go to Patient Education Activity  Goal: Patient/Family Education  Outcome: Progressing Towards Goal     Problem: Surgical Pathway Post-Op Day 2 through Discharge  Goal: Off Pathway (Use only if patient is Off Pathway)  Outcome: Progressing Towards Goal  Goal: Activity/Safety  Outcome: Progressing Towards Goal  Goal: Nutrition/Diet  Outcome: Progressing Towards Goal  Goal: Discharge Planning  Outcome: Progressing Towards Goal  Goal: Medications  Outcome: Progressing Towards Goal  Goal: Respiratory  Outcome: Progressing Towards Goal  Goal: Treatments/Interventions/Procedures  Outcome: Progressing Towards Goal  Goal: Psychosocial  Outcome: Progressing Towards Goal  Goal: *No signs and symptoms of infection or wound complications  Outcome: Progressing Towards Goal  Goal: *Optimal pain control at patient's stated goal  Outcome: Progressing Towards Goal  Goal: *Adequate urinary output (equal to or greater than 30 milliliters/hour)  Outcome: Progressing Towards Goal  Goal: *Hemodynamically stable  Outcome: Progressing Towards Goal  Goal: *Tolerating diet  Outcome: Progressing Towards Goal  Goal: *Demonstrates progressive activity  Outcome: Progressing Towards Goal  Goal: *Lungs clear or at baseline  Outcome: Progressing Towards Goal     Problem: Surgical Pathway: Discharge Outcomes  Goal: *Hemodynamically stable  Outcome: Progressing Towards Goal  Goal: *Lungs clear or at baseline  Outcome: Progressing Towards Goal  Goal: *Demonstrates independent activity or return to baseline  Outcome: Progressing Towards Goal  Goal: *Optimal pain control at patient's stated goal  Outcome: Progressing Towards Goal  Goal: *Verbalizes understanding and describes prescribed diet  Outcome: Progressing Towards Goal  Goal: *Tolerating diet  Outcome: Progressing Towards Goal  Goal: *Verbalizes name, dosage, time, side effects, and number of days to continue medications  Outcome: Progressing Towards Goal  Goal: *No signs and symptoms of infection or wound complications  Outcome: Progressing Towards Goal  Goal: *Anxiety reduced or absent  Outcome: Progressing Towards Goal  Goal: *Understands and describes signs and symptoms to report to providers(Stroke Metric)  Outcome: Progressing Towards Goal  Goal: *Describes follow-up/return visits to physicians  Outcome: Progressing Towards Goal  Goal: *Describes available resources and support systems  Outcome: Progressing Towards Goal

## 2019-09-09 NOTE — PROGRESS NOTES
Patient's blood sugar was 62 at 0639. Gave patient 2 orange juices and 2 packages of alina crackers. Rechecked at [phone]. Patient's blood sugar 61. Gave patient 4 glucose tabs. Nurse mikey Adam will recheck at [facility].

## 2019-09-09 NOTE — PROGRESS NOTES
Problem: Self Care Deficits Care Plan (Adult)  Goal: *Acute Goals and Plan of Care (Insert Text)  Description    FUNCTIONAL STATUS PRIOR TO ADMISSION: Patient was modified independent using a Rollator for functional mobility. HOME SUPPORT: The patient lived alone with daughter and grandaughter to provide assistance. Occupational Therapy Goals  Initiated 9/7/2019  1. Patient will perform bathing with modified independence within 7 day(s). 2.  Patient will perform lower body dressing with modified independence within 7 day(s). 3.  Patient will perform shower transfer with modified independence within 7 day(s). 4.  Patient will perform toilet transfers with modified independence within 7 day(s). 5.  Patient will perform all aspects of toileting with modified independence within 7 day(s). 6.  Patient will participate in upper extremity therapeutic exercise/activities with independence for 10 minutes within 7 day(s). 7.  Patient will utilize energy conservation techniques during functional activities with verbal cues within 7 day(s). Outcome: Progressing Towards Goal   OCCUPATIONAL THERAPY TREATMENT  Patient: Kendal Serve (84 y.o. female)  Date: 9/9/2019  Diagnosis: ASO (arteriosclerosis obliterans) [I70.90] <principal problem not specified>  Procedure(s) (LRB):  LEFT FEMORAL-POPLITEAL BYPASS WITH PTFE (Left) 4 Days Post-Op  Precautions: Contact, Fall(MRSA)  Chart, occupational therapy assessment, plan of care, and goals were reviewed. ASSESSMENT:   Based on the objective data described below, pt feeling better now that she has received 2 units of blood, 9.3 Hgb (was 6.5). Pt's mobility has improved demonstrated by needing less assistance with bed mobility and transfers. Pt also experiencing less pain in L LE. Overall, feel pt is progressing well and is motivated to participate. Feel pt will need further therapy at discharge 2/2 lives home alone.       Current Level of Function Impacting Discharge (ADLs): min assist with mobility, mod to max assist with LB self-care    Other factors to consider for discharge: lives alone at [facility] :  Patient continues to benefit from skilled intervention to address the above impairments. Continue treatment per established plan of care. to address goals. Recommend with staff: Misty Hutchinson for meals, ambulate to bathroom with assistance using RW    Recommend next OT session: LB self-care with AE    Recommendation for discharge: (in order for the patient to meet his/her long term goals)  Therapy up to 5 days/week in SNF setting    This discharge recommendation:  Has been made in collaboration with the attending provider and/or case management    Equipment recommendations for successful discharge (if) home: to be determined by rehab facility       SUBJECTIVE:   Patient stated I feel much better, can't wait to get up.     OBJECTIVE DATA SUMMARY:   Cognitive/Behavioral Status:  Neurologic State: Alert  Orientation Level: Oriented X4  Cognition: Appropriate decision making        Safety/Judgement: Awareness of environment    Functional Mobility and Transfers for ADLs:  Bed Mobility:  Rolling: Modified independent  Supine to Sit: Minimum assistance  Scooting: Modified independent    Transfers:  Sit to Stand: Minimum assistance          Balance:  Sitting: Intact  Standing: Impaired  Standing - Static: Constant support;Good  Standing - Dynamic : Constant support; Fair    ADL Intervention:                           Lower Body Dressing Assistance  Dressing Assistance: Maximum assistance  Underpants: Moderate assistance  Socks: Maximum assistance         Cognitive Retraining  Safety/Judgement: Awareness of environment    Therapeutic Exercises:       Pain:  No c/o pain    Activity Tolerance:   Good  Please refer to the flowsheet for vital signs taken during this treatment.     After treatment patient left in no apparent distress:   Sitting in chair and Call bell within reach    COMMUNICATION/COLLABORATION:   The patients plan of care was discussed with: Physical Therapist    Ady Coe OTR/L  Time Calculation: 32 mins

## 2019-09-09 NOTE — DISCHARGE INSTRUCTIONS
Patient Discharge Instructions    Dafne Ba / [id number] : 1945    Admitted 2019 Discharged: 2019     Take Home Medications            · It is important that you take the medication exactly as they are prescribed. · Keep your medication in the bottles provided by the pharmacist and keep a list of the medication names, dosages, and times to be taken in your wallet. · Do not take other medications without consulting your doctor. What to do at Home    Recommended diet: Regular Diet,     Recommended activity: Activity as tolerated,      Follow-up with Dr Dorena Jeans in 2 weeks        Information obtained by :  I understand that if any problems occur once I am at home I am to contact my physician. I understand and acknowledge receipt of the instructions indicated above.                                                                                                                                            Physician's or R.N.'s Signature                                                                  Date/Time                                                                                                                                              Patient or Representative Signature                                                          Date/Time

## 2019-09-09 NOTE — PROGRESS NOTES
Transition of Care Plan to SNF/Rehab    SNF/Rehab Transition:  Patient has been accepted to [facility] and meets criteria for admission. Patient will transported by [address] and expected to leave at 5pm.    Communication to Patient/Family:  Met with patient she is agreeable to the transition plan. Communication to SNF/Rehab:  Bedside RN, Janes Rodrigues, has been notified to update the transition plan to the facility and call report to [phone]. Pt has been assigned to room 305. Discharge information has been updated on the AVS.     Discharge instructions to be faxed to facility by [employer]    SNF/Rehab Transition:  Patient to follow-up with Home Health: [facility]  PCP/Specialist:   Ambulatory Care Management:     Reviewed and confirmed with facility, [facility] admissions coordinator-Ramirez Mauro, they can manage the patient care needs for the following:     Elbert Rivera with (X) only those applicable:    Medication:  []  Medications will be available at the facility  []  IV Antibiotics   []  Controlled Substance - hard copy to be sent with patient   []  Weekly Labs   Documents:  [] Hard RX  [] MAR  [] Kardex  [] AVS  []Transfer Summary  []Discharge   Equipment:  []  CPAP/BiPAP  []  Wound Vacuum  []  Bahena or Urinary Device  []  PICC/Central Line  []  Nebulizer  []  Ventilator   Treatment:  []Isolation (for MRSA, VRE, etc.)  []Surgical Drain Management  []Tracheostomy Care  []Dressing Changes  []Dialysis with transportation and chair time. []PEG Care  []Oxygen  []Daily Weights for Heart Failure   Dietary:  []Any diet limitations  []Tube Feedings   []Total Parenteral Management (TPN)   Eligible for Medicaid Long Term Services and Supports  Yes:  [] Eligible for medical assistance or will become eligible within 180 days and UAI completed. [] Provider/Patient and/or support system has requested screening.   [] UAI copy provided to patient or responsible party,   [] UAI unavailable at discharge will send once processed to SNF provider. [] UAI unavailable at discharged mailed to patient  No:   [] Private pay and is not financially eligible for Medicaid within the next 180 days. [] Reside out-of-state.   [] A residents of a state owned/operated facility that is licensed  by [address] and [employer] [facility] or [facility]  [] Enrollment in [facility] hospice services  [] [address] [place] [address] [facility]  [] Patient /Family declines to have screening completed or provide financial information for screening     Financial Resources:  Medicaid    [] Initiated and application pending   [] Full coverage     Advanced Care Plan:  []Surrogate Decision Maker of Care  []POA  []Communicated Code Status (DDNR\", \"Full\")    Other

## 2019-09-09 NOTE — PROGRESS NOTES
Problem: Mobility Impaired (Adult and Pediatric)  Goal: *Acute Goals and Plan of Care (Insert Text)  Description  FUNCTIONAL STATUS PRIOR TO ADMISSION: Patient was modified independent using a Rollator and Rolling walker for functional mobility and still driving    HOME [address]: [facility], but did not require assist.    Physical Therapy Goals  Initiated 9/7/2019  1. Patient will move from supine to sit and sit to supine  in bed with modified independence within 7 day(s). 2.  Patient will transfer from bed to chair and chair to bed with supervision/set-up using the least restrictive device within 7 day(s). 3.  Patient will perform sit to stand with supervision/set-up within 7 day(s). 4.  Patient will ambulate with minimal assistance/contact guard assist for 150 feet with the least restrictive device within 7 day(s). Outcome: Progressing Towards Goal   PHYSICAL THERAPY TREATMENT  Patient: Idalmis Matute (53 y.o. female)  Date: 9/9/2019  Diagnosis: ASO (arteriosclerosis obliterans) [I70.90] <principal problem not specified>  Procedure(s) (LRB):  LEFT FEMORAL-POPLITEAL BYPASS WITH PTFE (Left) 4 Days Post-Op  Precautions: Contact, Fall(MRSA)  Chart, physical therapy assessment, plan of care and goals were reviewed. ASSESSMENT  Based on the objective data described below, pt presents now 4 days post op L fem-pop. Received supine in bed with medial LLE proximal incision without dsg. RN notified. Temporary sterile 4x4's applied. Pt continues be very motivated, with much improved LLE pain and functioning now at Mod I with bed mobility and Min for gait with RW. She tolerated gait of 25' well and was left in NAD with BLE's elevated in recliner to promote healing.     Current Level of Function Impacting Discharge (mobility/balance): Min A/good balance with RW    Other factors to consider for discharge: to rehab at [facility] due to need of assist and wound care         PLAN :  Patient continues to benefit from skilled intervention to address the above impairments. Continue treatment per established plan of care. to address goals. Recommendation for discharge: (in order for the patient to meet his/her long term goals)  Therapy up to 5 days/week in SNF setting    This discharge recommendation:  Has been made in collaboration with the attending provider and/or case management    Equipment recommendations for successful discharge (if) home: pt has rollator and RW        SUBJECTIVE:   Patient stated It feels to good walk.     OBJECTIVE DATA SUMMARY:   Critical Behavior:  Neurologic State: Alert, Appropriate for age  Orientation Level: Oriented X4  Cognition: Follows commands  Safety/Judgement: Awareness of environment, Fall prevention, Insight into deficits  Functional Mobility Training:  Bed Mobility:  Rolling: Modified independent  Supine to Sit: Minimum assistance     Scooting: Modified independent        Transfers:  Sit to Stand: Minimum assistance  Stand to Sit: Minimum assistance                             Balance:  Sitting: Intact  Standing: Impaired  Standing - Static: Constant support;Good  Standing - Dynamic : Constant support; Fair  Ambulation/Gait Training:  Distance (ft): 25 Feet (ft)  Assistive Device: Walker, rolling;Gait belt  Ambulation - Level of Assistance: Minimal assistance                          Step Length: Left shortened;Right shortened                             Therapeutic Exercises: Ankle pumping  Pain Rating:  improved    Activity Tolerance:   Good  Please refer to the flowsheet for vital signs taken during this treatment.     After treatment patient left in no apparent distress:   Sitting in chair, Heels elevated for pressure relief and Call bell within reach    COMMUNICATION/COLLABORATION:   The patients plan of care was discussed with: Occupational Therapist and Registered Nurse    Eddie Bullard PT   Time Calculation: 29 mins

## 2019-09-09 NOTE — PROGRESS NOTES
Background    Kera Blackwell is a 72 y.o. female s/p L fem pop graft. Visit Vitals  /75 (BP 1 Location: Right arm, BP Patient Position: Sitting)   Pulse 78   Temp 98 °F (36.7 °C)   Resp 20   Ht 5' 7\" (1.702 m)   Wt 108.9 kg (240 lb)   SpO2 97%   BMI 37.59 kg/m²    O2 Flow Rate (L/min): 4 l/min O2 Device: Room air  Allergies   Allergen Reactions    Advair Diskus [Fluticasone Propion-Salmeterol] Rash    Aspartame Other (comments)    Breo Ellipta [Fluticasone Furoate-Vilanterol] Rash    Ciprofloxacin Rash    Statins-Hmg-Coa Reductase Inhibitors Other (comments)     Muscle pain  Lipitor/crestor/zocor    Sulfa (Sulfonamide Antibiotics) Rash    Tetracycline Other (comments)     musclepain    Zetia [Ezetimibe] Myalgia        Situation    Patient presented with increased WBC count, elevated BP & is at risk for sepsis; MEGHA Oh. Informed of pt's clinical findings; No orders received at this time; Will con't to monitor pt       Key Pain Meds             traMADol (ULTRAM) 50 mg tablet (Taking) Take 50 mg by mouth every six (6) hours as needed for Pain.               Current Facility-Administered Medications:     0.9% sodium chloride infusion 250 mL, 250 mL, IntraVENous, PRN, Ry Crow MD    0.9% sodium chloride infusion 250 mL, 250 mL, IntraVENous, PRN, Ry Crow MD    HYDROmorphone (PF) (DILAUDID) injection 1 mg, 1 mg, IntraVENous, Q4H PRN, Ry Crow MD, 1 mg at 09/09/19 0555    0.9% sodium chloride infusion, 75 mL/hr, IntraVENous, CONTINUOUS, Ry Crow MD, Last Rate: 75 mL/hr at 09/09/19 0518, 75 mL/hr at 09/09/19 0518    insulin regular (NOVOLIN R, HUMULIN R) injection, , SubCUTAneous, AC&HS, Ry Crow MD, Stopped at 09/08/19 2200    glucose chewable tablet 16 g, 4 Tab, Oral, PRN, Ry Crow MD, 16 g at 09/09/19 0700    glucagon (GLUCAGEN) injection 1 mg, 1 mg, IntraMUSCular, PRN, Ry Crow MD    dextrose 10% infusion 0-250 mL, 0-250 mL, IntraVENous, PRN, Yves Cerna MD    lactated Ringers infusion, 125 mL/hr, IntraVENous, CONTINUOUS, Brendon Arreguin DO    albuterol (PROVENTIL VENTOLIN) nebulizer solution 2.5 mg, 2.5 mg, Inhalation, PRN, Brendon Arreguin DO    ondansetron [facility]) injection 4 mg, 4 mg, IntraVENous, PRN, Rhett Zee DO    sAXagliptin (ONGLYZA) tablet 5 mg, 5 mg, Oral, DAILY, Yves Cerna MD, 5 mg at 09/09/19 1014    DULoxetine (CYMBALTA) capsule 60 mg, 60 mg, Oral, DAILY, Yves Cerna MD, 60 mg at 09/09/19 1007    cholecalciferol (VITAMIN D3) tablet 2,000 Units, 2,000 Units, Oral, DAILY, Yves Cerna MD, 2,000 Units at 09/09/19 1007    clopidogrel (PLAVIX) tablet 75 mg, 75 mg, Oral, QPM, Yves Cerna MD, 75 mg at 09/08/19 2124    albuterol (PROVENTIL VENTOLIN) nebulizer solution 2.5 mg, 2.5 mg, Nebulization, Q4H PRN, Yves Cerna MD    ipratropium (ATROVENT) 0.02 % nebulizer solution 0.5 mg, 0.5 mg, Nebulization, Q6H RT, Yves Cerna MD, 0.5 mg at 09/09/19 1415    mirtazapine (REMERON) tablet 15 mg, 15 mg, Oral, QHS, Yves Cerna MD, 15 mg at 09/08/19 2124    promethazine (PHENERGAN) tablet 25 mg, 25 mg, Oral, Q8H PRN, Yves Cerna MD    magnesium oxide (MAG-OX) tablet 400 mg, 400 mg, Oral, QHS, Yves Cerna MD, 400 mg at 09/08/19 2124    montelukast (SINGULAIR) tablet 10 mg, 10 mg, Oral, DAILY, Yves Cerna MD, 10 mg at 09/09/19 1007    potassium chloride SR (KLOR-CON 10) tablet 10 mEq, 10 mEq, Oral, BID, Yves Cerna MD, 10 mEq at 09/09/19 1007    predniSONE (DELTASONE) tablet 10 mg, 10 mg, Oral, BID WITH MEALS, Yves Cerna MD, 10 mg at 09/09/19 1007    pantoprazole (PROTONIX) tablet 40 mg, 40 mg, Oral, ACB&D, Yves Cerna MD, 40 mg at 09/09/19 0517    metoprolol tartrate (LOPRESSOR) tablet 25 mg, 25 mg, Oral, DAILY PRN, Yves Cerna MD, 25 mg at 09/09/19 0648    glipiZIDE SR (GLUCOTROL XL) tablet 2.5 mg, 2.5 mg, Oral, QHS, Yves Cerna MD, 2.5 mg at 09/08/19 7809   traMADol (ULTRAM) tablet 50 mg, 50 mg, Oral, Q6H PRN, Ry Crow MD, 50 mg at 09/09/19 0517    sodium chloride (NS) flush 5-40 mL, 5-40 mL, IntraVENous, Q8H, Ry Crow MD, 10 mL at 09/09/19 0517    sodium chloride (NS) flush 5-40 mL, 5-40 mL, IntraVENous, PRN, Ry Crow MD    apixaban [facility]) tablet 5 mg, 5 mg, Oral, BID, Ry Crow MD, 5 mg at 09/09/19 1007    azelastine (ASTELIN) 137mcg/spray nasal spray, 2 Spray, Both Nostrils, BID, 2 Spray at 09/09/19 1007 **AND** fluticasone propionate (FLONASE) 50 mcg/actuation nasal spray 2 Spray, 2 Spray, Both Nostrils, BID, Ry Crow MD, 2 [place] at 09/09/19 1008     Assessment findings    Recent Results (from the past 8 hour(s))   GLUCOSE, POC    Collection Time: 09/09/19  6:59 AM   Result Value Ref Range    Glucose (POC) 61 (L) 65 - 100 mg/dL    Performed by Estela Laurent ([facility])    GLUCOSE, POC    Collection Time: 09/09/19  7:17 AM   Result Value Ref Range    Glucose (POC) 89 65 - 100 mg/dL    Performed by Estela Laurent (P[facility])    GLUCOSE, POC    Collection Time: 09/09/19 11:37 AM   Result Value Ref Range    Glucose (POC) 88 65 - 100 mg/dL    Performed by Emeka Serrano (P[facility])       Jennifer Shelton, RN

## 2019-09-09 NOTE — CDMP QUERY
Patient admitted with ASO, s/p Left femoral/popliteal bypass, noted to have \"kidney disease\" documented per the H/P.  If possible, could you please document in progress notes and DC summary if you are evaluating and/or treating any of the following:    => CKD 2  => CKD 3  => Chronic Renal Insufficiency (please note stage)  => Unable to determine  => Other, please specify    Reference:    CKD Stages / [facility]  Stage 1:  GFR = > 90 ml/min  Stage 2:  GFR = 60 to 89  Stage 3:  GFR = 30 to 59  Stage 4:  GFR = 15 to 29  Stage 5:  GFR = < 15    The medical record reflects the following:      Risk Factors: DM, PAD, PVD      Clinical Indicators:   H/P notes: kidney disease    Baseline Renal: 6/2019 CR 1.41 GFR 36; 7/2019 CR 1.43-1.55 GFR 33-36    8/30 CR 1.33 GFR 39       Treatment: Monitor I/O, monitor renal labs when drawn    Thank you,  Joan Lopez, MSN, [address] [place] [address]
Pt admitted with ASO, s/p Left Femoral-Popliteal Bypass with  PTFE. Pt noted to have low HGB and transfused 2 units PRBC on 9/8. Haritha Crum  If possible, could you please document in the progress notes and DC summary if you are evaluating and / or treating any of the following:     Anemia due to acute blood loss   Anemia due to chronic blood loss   Anemia due to Acute/chronic blood loss   Anemia due to iron deficiency   Anemia due to postoperative blood loss (please specify if expected or a complication of the surgery)   Anemia due to chronic disease, please specify disease   Dilutional anemia   Other, please specify   Clinically unable to determine    The medical record reflects the following:       Risk Factors: PMH normocytic anemia       Clinical Indicators:   8/30 Pre-op HGB 7.2  9/6 HGB 7.5/6.7  9/8 HGB 6.5/8.8  9/9 HGB 9.3       Treatment: Monitor VS, labs (H/H) transfused 2 unit PRBCs as ordered    Thank you,  Hunter Tucker, MSN, [facility], [facility]
Detail Level: Simple

## 2019-09-09 NOTE — PROGRESS NOTES
CM sent clinical updates to [facility]. Awaiting response for bed availability. Brisa Landry LCSW    1:15pm:  [facility] has been [facility] and they can take pt today. RN, March Eng was notified.   Brisa Landry LCSW

## 2019-09-09 NOTE — OP NOTES
[facility]  OPERATIVE REPORT    Name:  Gale Gaytan  MR#:  [id number]  :  1945  ACCOUNT #:  [de-identified]  DATE OF SERVICE:  2019    PREOPERATIVE DIAGNOSIS:  Ischemic, left leg. POSTOPERATIVE DIAGNOSIS:  Ischemic, left leg. PROCEDURE PERFORMED:  Left fem-pop bypass with PTFE. SURGEON:  Miguelina Galarza MD    ASSISTANT:  None. ANESTHESIA:  General anesthesia. COMPLICATIONS:  None. SPECIMENS REMOVED:  None. IMPLANTS:  Graft. ESTIMATED BLOOD LOSS:  100 mL. INDICATION FOR PROCEDURE:  The patient is a 61-year-old female with multiple medical problems and arterial insufficiency to the left leg. A decision was made to take her to the operating room for bypass. TECHNICAL DETAILS:  The patient was taken to the operating room where a suitable level of anesthesia was introduced. She was prepped and draped in typical sterile fashion. Oblique incision was made in the groin and dissection carried out down to the femoral artery. It was dissected free of the soft tissue attachments. From previous intervention, the common and superficial femoral artery were sucked in with dense adhesions. A main branch of the profunda femoral artery was dissected free. The above-knee popliteal artery was exposed via a separate incision. The patient was systematically heparinized. A 7 mm PTFE graft was then sewed end-to-side to the profunda femoral artery using 5-0 Gepp-Dominic suture. This was tunneled anatomically and sewed end-to-side to the above-knee popliteal artery. Flow was restored to the graft. There was excellent signal throughout. The wounds were irrigated thoroughly. Hemostasis was controlled. The wounds were closed in multiple layers. She tolerated the procedure well. Sponge and instrument counts were correct x2. She was awakened and transferred to the recovery room in good condition.       Cheri Roach MD      [id number]/  D:  2019 7:45  T: 09/09/2019 17:21  JOB #:  [id number]

## 2019-09-09 NOTE — PROGRESS NOTES
9/9/2019 2:28 PM  CM verified patient has a qualifying hospital stay for [facility].  Janelle Torres, BSW

## 2019-09-09 NOTE — DIABETES MGMT
[facility]    [facility] Progress Note    Recommendations/ Comments: chart reviewed due to hypoglycemia. Pt with blood sugar of 61 mg/dl this morning. If appropriate, please consider:  1. Changing timing of Glipizide to breakfast   2. Changing correction scale to Humalog with high sensitivity. I  Current hospital DM medication:   Glipizide SR 2.5 mg nightly  Onglyza 5 mg daily  Correction scale Regular Insulin with normal sensitivity    Chart reviewed on [facility]. Patient is a 72 y.o. female with known Type 2 Diabetes on oral agents (dual therapy): glipizide GITS (Glucotrol-XL) 2.5 mg nightly, Onglyza 5 mg daily at home. A1c:   Lab Results   Component Value Date/Time    Hemoglobin A1c 7.7 (H) 05/17/2019 06:22 PM    Hemoglobin A1c 8.5 (H) 11/13/2018 03:26 PM       Recent Glucose Results:   Lab Results   Component Value Date/Time    GLUCPOC 89 09/09/2019 07:17 AM    GLUCPOC 61 (L) 09/09/2019 06:59 AM    GLUCPOC 62 (L) 09/09/2019 06:39 AM        Lab Results   Component Value Date/Time    Creatinine 1.33 (H) 08/30/2019 03:18 PM     Estimated Creatinine Clearance: 49.3 mL/min (A) (based on SCr of 1.33 mg/dL (H)). Active Orders   Diet    DIET DIABETIC CONSISTENT CARB Regular        PO intake:   Patient Vitals for the past 72 hrs:   % Diet Eaten   09/06/19 1831 30 %       Will continue to follow as needed.     Thank you  Abby Roach RD, CDE    Time spent: 3 min

## 2019-09-11 NOTE — PROGRESS NOTES
In response to coding query, patient has chronic stage 3 renal insufficiency and was treated for acute on chronic anemia secondary to surgical blood loss.

## 2019-09-12 NOTE — DISCHARGE SUMMARY
[address] SUMMARY    Name:  Gregg John  MR#:  [id number]  :  1945  ACCOUNT #:  [de-identified]  ADMIT DATE:  2019  DISCHARGE DATE:  2019    ADMISSION DIAGNOSIS:  Peripheral vascular disease. DISCHARGE DIAGNOSIS:  Peripheral vascular disease. PROCEDURE:  Left femoral popliteal bypass with PTFE done with me on date of admission. DETAILS OF ADMISSION AND HOSPITAL COURSE:  The patient is an elderly female with multiple medical problems and peripheral vascular disease involving her left leg. A decision was made to admit her for bypass. This was done without difficulty or complication on the date of admission. Postoperative course was unremarkable. On the date of discharge, she was doing well and was sent to rehab facility in good condition. DISCHARGE DISPOSITION:  Rehab facility.         Vivian Pena MD      [id number]/  D:  2019 7:14  T:  2019 22:03  JOB #:  [id number]

## 2019-10-08 ENCOUNTER — TELEPHONE (OUTPATIENT)
Dept: CARDIOLOGY CLINIC | Age: 74
End: 2019-10-08

## 2019-10-08 NOTE — TELEPHONE ENCOUNTER
Patients home health nurse, Dianna Ochoa, would like to verify a diagnosis for the patient.      Phone: [phone]

## 2019-10-31 ENCOUNTER — OFFICE VISIT (OUTPATIENT)
Dept: CARDIOLOGY CLINIC | Age: 74
End: 2019-10-31

## 2019-10-31 VITALS
BODY MASS INDEX: 39.08 KG/M2 | RESPIRATION RATE: 24 BRPM | SYSTOLIC BLOOD PRESSURE: 124 MMHG | HEIGHT: 67 IN | OXYGEN SATURATION: 96 % | DIASTOLIC BLOOD PRESSURE: 78 MMHG | WEIGHT: 249 LBS | HEART RATE: 88 BPM

## 2019-10-31 DIAGNOSIS — I10 ESSENTIAL HYPERTENSION: ICD-10-CM

## 2019-10-31 DIAGNOSIS — I25.10 CORONARY ARTERY DISEASE INVOLVING NATIVE CORONARY ARTERY OF NATIVE HEART WITHOUT ANGINA PECTORIS: Primary | ICD-10-CM

## 2019-10-31 DIAGNOSIS — E66.01 SEVERE OBESITY (HCC): ICD-10-CM

## 2019-10-31 DIAGNOSIS — R06.00 DYSPNEA, UNSPECIFIED TYPE: ICD-10-CM

## 2019-10-31 DIAGNOSIS — Z79.01 CHRONIC ANTICOAGULATION: ICD-10-CM

## 2019-10-31 NOTE — PROGRESS NOTES
Heather Robles is a 72 y.o. female    Chief Complaint   Patient presents with    Follow-up     3 mo f/u    Coronary Artery Disease    Hypertension    Anticoagulation    Other     PAD       Chest pain NO    SOB Patient states she gets very SOB with exertion. Dizziness NO    Swelling NO    Refills NO    Visit Vitals  /78 (BP 1 Location: Left arm, BP Patient Position: Sitting)   Pulse 88   Resp 24   Ht 5' 7\" (1.702 m)   Wt 249 lb (112.9 kg)   SpO2 96%   BMI 39.00 kg/m²       1. Have you been to the ER, urgent care clinic since your last visit? Hospitalized since your last visit? YES, 9/5/19- 9/9/19 for left femoral-popliteal bypass. 2. Have you seen or consulted any other health care providers outside of the [address] [place] [address] since your last visit? Include any pap smears or colon screening.  No

## 2019-10-31 NOTE — PROGRESS NOTES
Mariya Marin MD    [address] [facility], [address] [facility] [address]    Office [phone],OUD [phone]  Pager [phone]    Gurvinder Liriano is a 72 y.o. female is here for f/u visit  CAD    Primary care physician:  Mary Jo Mathew MD    Patient Active Problem List   Diagnosis Code    CAD (coronary artery disease) I25.10    Type II diabetes mellitus ([facility] E11.9    NSTEMI (non-ST elevated myocardial infarction) ([facility] I21.4    Coronary artery disease involving native coronary artery without angina pectoris I25.10    Essential hypertension I10    Recurrent deep vein thrombosis (DVT) ([facility]) I82.409    Chronic anticoagulation Z79.01    Chest pain R07.9    Generalized abdominal pain R10.84    Type 2 diabetes with nephropathy ([facility] E11.21    Dyspnea R06.00    ARF (acute renal failure) ([facility] N17.9    Obesity (BMI 30-39. 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 113    Mild intermittent asthma J45.20    Gangrene of finger of right hand ([facility]) I96    Syncope and collapse R55    UTI (urinary tract infection) N39.0    COPD (chronic obstructive pulmonary disease) ([facility]) J44.9    Sepsis ([facility]) A41.9    Normocytic anemia D64.9    PAD (peripheral artery disease) ([facility]) I73.9    Leg wound, left S81.802A    Leg laceration, right, initial encounter S81.811A    Severe obesity ([facility]) E66.01    PVD (peripheral vascular disease) ([facility]) I73.9    ASO (arteriosclerosis obliterans) I70.90       Chief complaint:  Chief Complaint   Patient presents with    Follow-up     3 mo f/u    Coronary Artery Disease    Hypertension    Anticoagulation    Other     PAD       Dear Dr Thong Amaya,    I had the pleasure of seeing Ms Gurvinder Liriano in the office today. Assessment:  CAD s/p PCI to RCA with BMS ( NSTEMI 10/2015); 6/6/18- LAD stent/D1 PTCA ( CJW)  HTN - low  HLD - intolerant to multiple statins sec to myalgia  DM - HbA1c 5.6 - Oct 2019  History of right DVT-October 2016; Has had prior DVT 1998  Chronic anticoagulation   Asthma - f/by Dr Hamilton Shoulders  PAD - followed by Dr Susi Parish. 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 Kendra Newby at [facility]        Plan:     After being off lisinopril, the dizziness has resolved. She takes metoprolol on a as needed basis. She will try to take a 12.5 mg twice daily if she can tolerate it. She will stop her Plavix and take baby aspirin along with her Eliquis. .  (Not on statin ( allergic/intolerant) -per patient her cholesterol is doing well. Has been intolerant to Lipitor, Crestor, Zocor-myalgias. She had her lipids checked in February 2018 ( PCP)  and it was elevated. We have tried to get her PCSK9 inhibitors. However the cost is prohibitive and the nurse navigator working on it. Has tried Zetia but had side effects including muscle aches.)  Aggressive cardiovascular risk factor modification. Follow-up in12 weeks. Patient understands the plan. All questions were answered to the patient's satisfaction. Medication Side Effects and Warnings were discussed with patient: yes  Patient Labs were reviewed and or requested:  yes  Patient Past Records were reviewed and or requested: yes    I appreciate the opportunity to be involved in Ms. Sellers. See note below for details. Please do not hesitate to contact us with questions or concerns. Jet Carrillo MD    Cardiac Testing/ Procedures: A. Cardiac Cath/PCI: 6/6/18   At [address] [facility] [address] - LAD stent/PTCA of D1    10/9/15 L Main: Medl;Nml    LAD: Prox- Med; 50%; Distal - small; D1 - Small to med - prox 60%    LCflex: Large; Tortuous; MLI; GUILLERMO - med - MLI    RCA: Med; Prox 80%;  Distal 95% just before bifurcation into small PDA and PLB    LVEDP: 22    LVEF: 55%/ Mild basal inf hypokinesis    No significant gradient across aortic valve. PCI: JR4 guide/BMW  Pre dil with 2 by 12 balloon  BMS 2.25 by 22 deployed at [place] distally  BMS 2.5 by 18 deployed at [place] proximal lesion  Post dil with 2.5 by 15      B. ECHO/MEE: Echo 7/14/2019-EF 61 to 65%, mild LVH  6/2018 - Left ventricle: Systolic function was normal. Ejection fraction was  estimated to be 60 %. There were no regional wall motion abnormalities. Doppler parameters were consistent with abnormal left ventricular  relaxation (grade 1 diastolic dysfunction). Aortic valve: Leaflets exhibited normal cuspal separation and good  mobility. Not well visualized. 10/11/15 - Left ventricle: Systolic function was vigorous. Ejection fraction was  estimated in the range of 65 % to 70 %. There were no regional wall motion  abnormalities. C.StressNuclear/Stress ECHO/Stress test:    D.Vascular: 9/9/19 L Fem-pop bypass with PTFE    E. EP:    F. Miscellaneous:    Subjective:  Bharath Florentino is a 72 y.o. female who returns for follow up  Visit. No chest pain. Continues to have chronic dyspnea on exertion/wheezing. Has skin ecchymosis secondary to being on both Eliquis and Plavix. JUne 2019 - L hand digit amputation at [facility]  September 2019-left lower extremity bypass surgery by Dr. Asad Yanez    No CP/ C/o occ wheezing/dyspnea on exertion. She is now on Eliquis and Plavix. Currently on hold prior to her procedure. History of recurrent DVT. ROS:cc  (bold if positive, if negative)     edema         Medications before admission:    Current Outpatient Medications   Medication Sig Dispense    ferrous sulfate (SLOW FE) 142 mg (45 mg iron) ER tablet Take  by mouth Daily (before breakfast).  traMADol (ULTRAM) 50 mg tablet Take 50 mg by mouth every six (6) hours as needed for Pain.      metoprolol tartrate (LOPRESSOR) 25 mg tablet Take 25 mg by mouth daily as needed. For systolic >551     glipiZIDE SR (GLUCOTROL XL) 2.5 mg CR tablet Take  by mouth nightly.  pantoprazole (PROTONIX) 40 mg tablet Take 1 Tab by mouth Before breakfast and dinner. 1 Tab    magnesium oxide (MAG-OX) 400 mg tablet Take 400 mg by mouth nightly.  montelukast (SINGULAIR) 10 mg tablet Take 10 mg by mouth daily.  potassium chloride SR (KLOR-CON 10) 10 mEq tablet Take 10 mEq by mouth two (2) times a day.  predniSONE (DELTASONE) 10 mg tablet Take 10 mg by mouth two (2) times daily (with meals).  apixaban (ELIQUIS) 5 mg tablet Take 1 Tab by mouth two (2) times a day. 60 Tab    Omeprazole delayed release (PRILOSEC D/R) 20 mg tablet Take 20 mg by mouth two (2) times a day.  ibandronate (BONIVA) 150 mg tablet Take 150 mg by mouth every thirty (30) days. Last dose 7/1/19     promethazine (PHENERGAN) 25 mg tablet Take 25 mg by mouth every eight (8) hours as needed for Nausea (Nausea not relieved by ondansetron).  mirtazapine (REMERON) 15 mg tablet Take 15 mg by mouth nightly.  tiotropium (SPIRIVA WITH HANDIHALER) 18 mcg inhalation capsule Take 1 Cap by inhalation daily.  albuterol (PROVENTIL VENTOLIN) 2.5 mg /3 mL (0.083 %) nebulizer solution 2.5 mg by Nebulization route every six (6) hours as needed for Wheezing or Shortness of Breath.  albuterol (PROAIR HFA) 90 mcg/actuation inhaler Take 2 Puffs by inhalation every four (4) hours as needed.  clopidogrel (PLAVIX) 75 mg tab Take 75 mg by mouth every evening.  lactobacillus rhamnosus gg 10 billion cell (CULTURELLE) 10 billion cell capsule Take 1 Cap by mouth daily.  biotin 10,000 mcg cap Take 1 Cap by mouth daily.  saxagliptin (ONGLYZA) 5 mg tab tablet Take 5 mg by mouth daily.  DULoxetine (CYMBALTA) 60 mg capsule Take 60 mg by mouth daily.  cholecalciferol (VITAMIN D3) 1,000 unit tablet Take 2,000 Units by mouth daily.      azelastine-fluticasone (DYMISTA) 137-50 mcg/spray spry 2 Sprays by Nasal route two (2) times a day.  mometasone-formoterol (DULERA) 200-5 mcg/actuation HFA inhaler Take 2 Puffs by inhalation two (2) times a day. No current facility-administered medications for this visit. Physical Exam:  Visit Vitals  /78 (BP 1 Location: Left arm, BP Patient Position: Sitting)   Pulse 88   Resp 24   Ht 5' 7\" (1.702 m)   Wt 249 lb (112.9 kg)   SpO2 96%   BMI 39.00 kg/m²          Gen: Well-developed, well-nourished, in no acute distress, Obese  Neck: Supple,No JVD, No Carotid Bruit,   Resp: No accessory muscle use, Bilat  rhonchi  Card: Regular Rate,Rythm,Normal S1, S2, No murmurs, rubs or gallop. No thrills.    Abd:  Soft, non-tender, non-distended,BS+,   MSK: No cyanosis  Skin: Ecchymosis+  Neuro: moving all four extremities , follows commands appropriately  Psych:  Good insight, oriented to person, place , alert, Nml Affect  LE: 1+edema    EKG:          LABS:        Lab Results   Component Value Date/Time    WBC 12.1 (H) 09/09/2019 06:03 AM    HGB 9.3 (L) 09/09/2019 06:03 AM    HCT 32.4 (L) 09/09/2019 06:03 AM    PLATELET 268 80/07/8268 06:03 AM    MCV 82.4 09/09/2019 06:03 AM     Lab Results   Component Value Date/Time    Sodium 139 08/30/2019 03:18 PM    Potassium 4.7 08/30/2019 03:18 PM    Chloride 108 08/30/2019 03:18 PM    CO2 22 08/30/2019 03:18 PM    Anion gap 9 08/30/2019 03:18 PM    Glucose 139 (H) 08/30/2019 03:18 PM    BUN 26 (H) 08/30/2019 03:18 PM    Creatinine 1.33 (H) 08/30/2019 03:18 PM    BUN/Creatinine ratio 20 08/30/2019 03:18 PM    GFR est AA 48 (L) 08/30/2019 03:18 PM    GFR est non-AA 39 (L) 08/30/2019 03:18 PM    Calcium 8.5 08/30/2019 03:18 PM       Lab Results   Component Value Date/Time    aPTT 20.6 (L) 08/30/2019 03:18 PM     Lab Results   Component Value Date/Time    INR 1.0 08/30/2019 03:18 PM    INR 1.0 07/30/2019 09:35 AM    INR 1.1 04/26/2019 10:52 AM    Prothrombin time 9.8 08/30/2019 03:18 PM    Prothrombin time 9.7 07/30/2019 09:35 AM    Prothrombin time 11.3 (H) 04/26/2019 10:52 AM     No components found for: Gilford Rasher, MD

## 2019-11-26 NOTE — PROGRESS NOTES
Chief complaint:   Chief Complaint   Patient presents with   • Chest Pain     tightness in chest 5 days, per patient it can be a shooting pain at times. high BP when he checked it at work.       Vitals:  Visit Vitals  BP (!) 132/94 (BP Location: RUE - Right upper extremity, Patient Position: Sitting, Cuff Size: Regular)   Pulse 87   Ht 5' 8\" (1.727 m)   Wt 113 kg   SpO2 95%   BMI 37.88 kg/m²       HISTORY OF PRESENT ILLNESS     HPI    Other significant problems:  There are no active problems to display for this patient.      PAST MEDICAL, FAMILY AND SOCIAL HISTORY     Medications:  Current Outpatient Medications   Medication   • amLODIPine (NORVASC) 2.5 MG tablet   • Indomethacin 20 MG Cap     No current facility-administered medications for this visit.        Allergies:  ALLERGIES:   Allergen Reactions   • Augmentin [Amoclan] RASH       Past Medical  History/Surgeries:  Past Medical History:   Diagnosis Date   • Chlamydia        No past surgical history on file.    Family History:  No family history on file.    Social History:  Social History     Tobacco Use   • Smoking status: Never Smoker   • Smokeless tobacco: Never Used   Substance Use Topics   • Alcohol use: No       REVIEW OF SYSTEMS     Review of Systems    PHYSICAL EXAM     Physical Exam    ASSESSMENT/PLAN     This office note has been dictated.     TRANSFER - IN REPORT:    Verbal report received from Reggie(name) on Huan Shanks  being received from [employer]) for change in patient condition(FEM-POP bypass)      Report consisted of patients Situation, Background, Assessment and   Recommendations(SBAR). Information from the following report(s) SBAR, Kardex, ED Summary, OR Summary, Procedure Summary, Intake/Output, MAR, Accordion, Recent Results, Med Rec Status and Cardiac Rhythm SR-ST was reviewed with the receiving nurse. Opportunity for questions and clarification was provided. Assessment completed upon patients arrival to unit and care assumed. Primary Nurse Kadi Colunga and [facility], RN performed a dual skin assessment on this patient. Scattered Bruising/ Ecchymosis/ Ulcers BUE & BLE. See Darrion Score. *SEE charting for Peripheral Vascular checks*    2127- Pt arrived to the floor from PACU- Awake- drowsy. A&Ox4, RUIZ. Pt has oxygen mask in place on 6 Lts. Lungs coarse/diminished. Belly round/obese with active bowel sounds. Pulses to BUE present (amputated x3  Partial digits on right hand) BLE dopplers- present. Scattered Bruising/ Ecchymosis/ Ulcers BUE & BLE. Pt able to turn self, call bell within reach. Daughter at bedside and has been update. 2200- Pt passed bedside swallow. Meds given. RT called to come evaluate and place pt on home Cpap.     0000- Reassessment completed and unchanged. Pt has been turned and repositioned. No other needs at this time. Call bell within reach. NPO @ midnight. 0200- Pt has been turned and repositioned. Call bell with in reach.      0400- Reassessment completed and unchanged. Pt has been turned and repositioned. No other needs at this time. Call bell within reach.     0430- Labs drawn and sent. 0600- No needs at this time. Pt has been turned and repositioned. Call bell within reach.

## 2020-01-01 ENCOUNTER — ANESTHESIA EVENT (OUTPATIENT)
Dept: INTERVENTIONAL RADIOLOGY/VASCULAR | Age: 75
DRG: 981 | End: 2020-01-01
Payer: MEDICARE

## 2020-01-01 ENCOUNTER — APPOINTMENT (OUTPATIENT)
Dept: GENERAL RADIOLOGY | Age: 75
DRG: 603 | End: 2020-01-01
Attending: PHYSICIAN ASSISTANT
Payer: MEDICARE

## 2020-01-01 ENCOUNTER — APPOINTMENT (OUTPATIENT)
Dept: GENERAL RADIOLOGY | Age: 75
DRG: 981 | End: 2020-01-01
Attending: FAMILY MEDICINE
Payer: MEDICARE

## 2020-01-01 ENCOUNTER — PATIENT OUTREACH (OUTPATIENT)
Dept: CASE MANAGEMENT | Age: 75
End: 2020-01-01

## 2020-01-01 ENCOUNTER — APPOINTMENT (OUTPATIENT)
Dept: GENERAL RADIOLOGY | Age: 75
DRG: 603 | End: 2020-01-01
Attending: STUDENT IN AN ORGANIZED HEALTH CARE EDUCATION/TRAINING PROGRAM
Payer: MEDICARE

## 2020-01-01 ENCOUNTER — OFFICE VISIT (OUTPATIENT)
Dept: CARDIOLOGY CLINIC | Age: 75
End: 2020-01-01

## 2020-01-01 ENCOUNTER — TELEPHONE (OUTPATIENT)
Dept: FAMILY MEDICINE CLINIC | Age: 75
End: 2020-01-01

## 2020-01-01 ENCOUNTER — APPOINTMENT (OUTPATIENT)
Dept: NON INVASIVE DIAGNOSTICS | Age: 75
DRG: 981 | End: 2020-01-01
Attending: STUDENT IN AN ORGANIZED HEALTH CARE EDUCATION/TRAINING PROGRAM
Payer: MEDICARE

## 2020-01-01 ENCOUNTER — APPOINTMENT (OUTPATIENT)
Dept: ULTRASOUND IMAGING | Age: 75
DRG: 872 | End: 2020-01-01
Attending: STUDENT IN AN ORGANIZED HEALTH CARE EDUCATION/TRAINING PROGRAM
Payer: MEDICARE

## 2020-01-01 ENCOUNTER — HOSPITAL ENCOUNTER (INPATIENT)
Age: 75
LOS: 8 days | Discharge: NURSING, REHAB OR LONG-TERM CARE | DRG: 603 | End: 2020-03-31
Attending: STUDENT IN AN ORGANIZED HEALTH CARE EDUCATION/TRAINING PROGRAM | Admitting: INTERNAL MEDICINE
Payer: MEDICARE

## 2020-01-01 ENCOUNTER — ANESTHESIA (OUTPATIENT)
Dept: INTERVENTIONAL RADIOLOGY/VASCULAR | Age: 75
DRG: 981 | End: 2020-01-01
Payer: MEDICARE

## 2020-01-01 ENCOUNTER — HOSPITAL ENCOUNTER (INPATIENT)
Age: 75
LOS: 13 days | Discharge: NURSING, REHAB OR LONG-TERM CARE | DRG: 981 | End: 2020-10-12
Attending: EMERGENCY MEDICINE | Admitting: INTERNAL MEDICINE
Payer: MEDICARE

## 2020-01-01 ENCOUNTER — APPOINTMENT (OUTPATIENT)
Dept: CT IMAGING | Age: 75
DRG: 603 | End: 2020-01-01
Attending: INTERNAL MEDICINE
Payer: MEDICARE

## 2020-01-01 ENCOUNTER — TRANSCRIBE ORDER (OUTPATIENT)
Dept: SCHEDULING | Age: 75
End: 2020-01-01

## 2020-01-01 ENCOUNTER — APPOINTMENT (OUTPATIENT)
Dept: GENERAL RADIOLOGY | Age: 75
DRG: 981 | End: 2020-01-01
Attending: STUDENT IN AN ORGANIZED HEALTH CARE EDUCATION/TRAINING PROGRAM
Payer: MEDICARE

## 2020-01-01 ENCOUNTER — APPOINTMENT (OUTPATIENT)
Dept: VASCULAR SURGERY | Age: 75
DRG: 603 | End: 2020-01-01
Attending: FAMILY MEDICINE
Payer: MEDICARE

## 2020-01-01 ENCOUNTER — APPOINTMENT (OUTPATIENT)
Dept: GENERAL RADIOLOGY | Age: 75
DRG: 981 | End: 2020-01-01
Attending: NURSE PRACTITIONER
Payer: MEDICARE

## 2020-01-01 ENCOUNTER — HOSPITAL ENCOUNTER (INPATIENT)
Age: 75
LOS: 4 days | Discharge: HOME | DRG: 872 | End: 2020-06-02
Attending: STUDENT IN AN ORGANIZED HEALTH CARE EDUCATION/TRAINING PROGRAM | Admitting: INTERNAL MEDICINE
Payer: MEDICARE

## 2020-01-01 ENCOUNTER — HOSPITAL ENCOUNTER (OUTPATIENT)
Dept: MRI IMAGING | Age: 75
Discharge: HOME | DRG: 981 | End: 2020-09-30
Attending: PHYSICIAN ASSISTANT
Payer: MEDICARE

## 2020-01-01 ENCOUNTER — APPOINTMENT (OUTPATIENT)
Dept: INTERVENTIONAL RADIOLOGY/VASCULAR | Age: 75
DRG: 981 | End: 2020-01-01
Attending: FAMILY MEDICINE
Payer: MEDICARE

## 2020-01-01 ENCOUNTER — APPOINTMENT (OUTPATIENT)
Dept: GENERAL RADIOLOGY | Age: 75
DRG: 603 | End: 2020-01-01
Attending: FAMILY MEDICINE
Payer: MEDICARE

## 2020-01-01 ENCOUNTER — APPOINTMENT (OUTPATIENT)
Dept: GENERAL RADIOLOGY | Age: 75
DRG: 872 | End: 2020-01-01
Attending: STUDENT IN AN ORGANIZED HEALTH CARE EDUCATION/TRAINING PROGRAM
Payer: MEDICARE

## 2020-01-01 ENCOUNTER — HOSPITAL ENCOUNTER (OUTPATIENT)
Dept: CT IMAGING | Age: 75
Discharge: HOME | DRG: 603 | End: 2020-11-02
Attending: FAMILY MEDICINE
Payer: MEDICARE

## 2020-01-01 ENCOUNTER — HOSPITAL ENCOUNTER (INPATIENT)
Age: 75
LOS: 4 days | Discharge: NURSING, REHAB OR LONG-TERM CARE | DRG: 603 | End: 2020-11-06
Attending: EMERGENCY MEDICINE | Admitting: INTERNAL MEDICINE
Payer: MEDICARE

## 2020-01-01 ENCOUNTER — APPOINTMENT (OUTPATIENT)
Dept: CT IMAGING | Age: 75
DRG: 981 | End: 2020-01-01
Attending: EMERGENCY MEDICINE
Payer: MEDICARE

## 2020-01-01 VITALS
OXYGEN SATURATION: 96 % | HEIGHT: 67 IN | DIASTOLIC BLOOD PRESSURE: 69 MMHG | WEIGHT: 255.7 LBS | SYSTOLIC BLOOD PRESSURE: 127 MMHG | RESPIRATION RATE: 18 BRPM | BODY MASS INDEX: 40.13 KG/M2 | HEART RATE: 96 BPM | TEMPERATURE: 98.1 F

## 2020-01-01 VITALS
TEMPERATURE: 98.1 F | HEIGHT: 67 IN | RESPIRATION RATE: 18 BRPM | BODY MASS INDEX: 38.64 KG/M2 | HEART RATE: 94 BPM | WEIGHT: 246.2 LBS | OXYGEN SATURATION: 100 % | SYSTOLIC BLOOD PRESSURE: 143 MMHG | DIASTOLIC BLOOD PRESSURE: 81 MMHG

## 2020-01-01 VITALS
DIASTOLIC BLOOD PRESSURE: 80 MMHG | BODY MASS INDEX: 39.99 KG/M2 | WEIGHT: 254.8 LBS | SYSTOLIC BLOOD PRESSURE: 160 MMHG | HEIGHT: 67 IN | HEART RATE: 90 BPM

## 2020-01-01 VITALS
OXYGEN SATURATION: 95 % | HEART RATE: 100 BPM | TEMPERATURE: 98.3 F | SYSTOLIC BLOOD PRESSURE: 144 MMHG | RESPIRATION RATE: 16 BRPM | BODY MASS INDEX: 39.94 KG/M2 | WEIGHT: 255 LBS | DIASTOLIC BLOOD PRESSURE: 72 MMHG

## 2020-01-01 VITALS
HEIGHT: 67 IN | RESPIRATION RATE: 18 BRPM | DIASTOLIC BLOOD PRESSURE: 75 MMHG | SYSTOLIC BLOOD PRESSURE: 133 MMHG | HEART RATE: 96 BPM | OXYGEN SATURATION: 95 % | TEMPERATURE: 97.3 F | BODY MASS INDEX: 39.24 KG/M2 | WEIGHT: 250 LBS

## 2020-01-01 DIAGNOSIS — R94.5 ABNORMAL LIVER FUNCTION: ICD-10-CM

## 2020-01-01 DIAGNOSIS — Z20.822 SUSPECTED 2019 NOVEL CORONAVIRUS INFECTION: ICD-10-CM

## 2020-01-01 DIAGNOSIS — N39.0 URINARY TRACT INFECTION WITHOUT HEMATURIA, SITE UNSPECIFIED: Primary | ICD-10-CM

## 2020-01-01 DIAGNOSIS — I48.0 PAROXYSMAL ATRIAL FIBRILLATION (HCC): ICD-10-CM

## 2020-01-01 DIAGNOSIS — I10 ESSENTIAL HYPERTENSION: ICD-10-CM

## 2020-01-01 DIAGNOSIS — T84.50XA INFECTION AND INFLAMMATORY REACTION DUE TO INTERNAL JOINT PROSTHESIS (HCC): ICD-10-CM

## 2020-01-01 DIAGNOSIS — M10.00 IDIOPATHIC GOUT, UNSPECIFIED CHRONICITY, UNSPECIFIED SITE: ICD-10-CM

## 2020-01-01 DIAGNOSIS — E66.01 SEVERE OBESITY (HCC): ICD-10-CM

## 2020-01-01 DIAGNOSIS — I50.32 CHRONIC DIASTOLIC HEART FAILURE (HCC): Primary | ICD-10-CM

## 2020-01-01 DIAGNOSIS — L03.116 CELLULITIS OF LEFT LOWER EXTREMITY: Primary | ICD-10-CM

## 2020-01-01 DIAGNOSIS — D84.821 IMMUNOSUPPRESSION DUE TO CHRONIC STEROID USE (HCC): ICD-10-CM

## 2020-01-01 DIAGNOSIS — D72.829 LEUKOCYTOSIS, UNSPECIFIED TYPE: ICD-10-CM

## 2020-01-01 DIAGNOSIS — I48.91 ATRIAL FIBRILLATION, UNSPECIFIED TYPE (HCC): ICD-10-CM

## 2020-01-01 DIAGNOSIS — R07.9 CHEST PAIN ON EXERTION: ICD-10-CM

## 2020-01-01 DIAGNOSIS — R55 NEAR SYNCOPE: Primary | ICD-10-CM

## 2020-01-01 DIAGNOSIS — I73.9 PVD (PERIPHERAL VASCULAR DISEASE) (HCC): ICD-10-CM

## 2020-01-01 DIAGNOSIS — T84.50XA INFECTION AND INFLAMMATORY REACTION DUE TO INTERNAL JOINT PROSTHESIS (HCC): Primary | ICD-10-CM

## 2020-01-01 DIAGNOSIS — I50.33 DIASTOLIC CHF, ACUTE ON CHRONIC (HCC): ICD-10-CM

## 2020-01-01 DIAGNOSIS — R60.9 EDEMA, UNSPECIFIED TYPE: ICD-10-CM

## 2020-01-01 DIAGNOSIS — S32.039A CLOSED FRACTURE OF THIRD LUMBAR VERTEBRA, UNSPECIFIED FRACTURE MORPHOLOGY, INITIAL ENCOUNTER (HCC): ICD-10-CM

## 2020-01-01 DIAGNOSIS — Z79.52 IMMUNOSUPPRESSION DUE TO CHRONIC STEROID USE (HCC): ICD-10-CM

## 2020-01-01 DIAGNOSIS — R06.02 SHORTNESS OF BREATH: ICD-10-CM

## 2020-01-01 DIAGNOSIS — Z79.01 CHRONIC ANTICOAGULATION: ICD-10-CM

## 2020-01-01 DIAGNOSIS — D64.9 ANEMIA, UNSPECIFIED TYPE: ICD-10-CM

## 2020-01-01 DIAGNOSIS — R06.09 DYSPNEA ON EXERTION: ICD-10-CM

## 2020-01-01 DIAGNOSIS — L03.119 CELLULITIS OF LOWER EXTREMITY, UNSPECIFIED LATERALITY: Primary | ICD-10-CM

## 2020-01-01 DIAGNOSIS — T38.0X5A IMMUNOSUPPRESSION DUE TO CHRONIC STEROID USE (HCC): ICD-10-CM

## 2020-01-01 DIAGNOSIS — R78.81 BACTEREMIA: ICD-10-CM

## 2020-01-01 DIAGNOSIS — Z78.9 FAILURE OF OUTPATIENT TREATMENT: ICD-10-CM

## 2020-01-01 LAB
ABO + RH BLD: NORMAL
ALBUMIN SERPL-MCNC: 1.7 G/DL (ref 3.5–5)
ALBUMIN SERPL-MCNC: 1.8 G/DL (ref 3.5–5)
ALBUMIN SERPL-MCNC: 1.9 G/DL (ref 3.5–5)
ALBUMIN SERPL-MCNC: 2 G/DL (ref 3.5–5)
ALBUMIN SERPL-MCNC: 2.1 G/DL (ref 3.5–5)
ALBUMIN SERPL-MCNC: 2.2 G/DL (ref 3.5–5)
ALBUMIN SERPL-MCNC: 2.3 G/DL (ref 3.5–5)
ALBUMIN SERPL-MCNC: 2.3 G/DL (ref 3.5–5)
ALBUMIN/GLOB SERPL: 0.5 {RATIO} (ref 1.1–2.2)
ALBUMIN/GLOB SERPL: 0.6 {RATIO} (ref 1.1–2.2)
ALBUMIN/GLOB SERPL: 0.7 {RATIO} (ref 1.1–2.2)
ALBUMIN/GLOB SERPL: 0.8 {RATIO} (ref 1.1–2.2)
ALBUMIN/GLOB SERPL: 0.9 {RATIO} (ref 1.1–2.2)
ALP SERPL-CCNC: 154 U/L (ref 45–117)
ALP SERPL-CCNC: 158 U/L (ref 45–117)
ALP SERPL-CCNC: 164 U/L (ref 45–117)
ALP SERPL-CCNC: 166 U/L (ref 45–117)
ALP SERPL-CCNC: 168 U/L (ref 45–117)
ALP SERPL-CCNC: 189 U/L (ref 45–117)
ALP SERPL-CCNC: 197 U/L (ref 45–117)
ALP SERPL-CCNC: 203 U/L (ref 45–117)
ALP SERPL-CCNC: 219 U/L (ref 45–117)
ALP SERPL-CCNC: 236 U/L (ref 45–117)
ALP SERPL-CCNC: 241 U/L (ref 45–117)
ALP SERPL-CCNC: 242 U/L (ref 45–117)
ALP SERPL-CCNC: 255 U/L (ref 45–117)
ALP SERPL-CCNC: 263 U/L (ref 45–117)
ALP SERPL-CCNC: 267 U/L (ref 45–117)
ALP SERPL-CCNC: 270 U/L (ref 45–117)
ALP SERPL-CCNC: 283 U/L (ref 45–117)
ALP SERPL-CCNC: 289 U/L (ref 45–117)
ALP SERPL-CCNC: 290 U/L (ref 45–117)
ALP SERPL-CCNC: 290 U/L (ref 45–117)
ALP SERPL-CCNC: 295 U/L (ref 45–117)
ALP SERPL-CCNC: 296 U/L (ref 45–117)
ALP SERPL-CCNC: 310 U/L (ref 45–117)
ALP SERPL-CCNC: 331 U/L (ref 45–117)
ALP SERPL-CCNC: 361 U/L (ref 45–117)
ALP SERPL-CCNC: 380 U/L (ref 45–117)
ALP SERPL-CCNC: 392 U/L (ref 45–117)
ALT SERPL-CCNC: 103 U/L (ref 12–78)
ALT SERPL-CCNC: 124 U/L (ref 12–78)
ALT SERPL-CCNC: 14 U/L (ref 12–78)
ALT SERPL-CCNC: 15 U/L (ref 12–78)
ALT SERPL-CCNC: 16 U/L (ref 12–78)
ALT SERPL-CCNC: 162 U/L (ref 12–78)
ALT SERPL-CCNC: 178 U/L (ref 12–78)
ALT SERPL-CCNC: 20 U/L (ref 12–78)
ALT SERPL-CCNC: 21 U/L (ref 12–78)
ALT SERPL-CCNC: 23 U/L (ref 12–78)
ALT SERPL-CCNC: 24 U/L (ref 12–78)
ALT SERPL-CCNC: 27 U/L (ref 12–78)
ALT SERPL-CCNC: 28 U/L (ref 12–78)
ALT SERPL-CCNC: 31 U/L (ref 12–78)
ALT SERPL-CCNC: 32 U/L (ref 12–78)
ALT SERPL-CCNC: 35 U/L (ref 12–78)
ALT SERPL-CCNC: 36 U/L (ref 12–78)
ALT SERPL-CCNC: 38 U/L (ref 12–78)
ALT SERPL-CCNC: 39 U/L (ref 12–78)
ALT SERPL-CCNC: 40 U/L (ref 12–78)
ALT SERPL-CCNC: 46 U/L (ref 12–78)
ALT SERPL-CCNC: 50 U/L (ref 12–78)
ALT SERPL-CCNC: 50 U/L (ref 12–78)
ALT SERPL-CCNC: 67 U/L (ref 12–78)
ALT SERPL-CCNC: 95 U/L (ref 12–78)
ANION GAP SERPL CALC-SCNC: 1 MMOL/L (ref 5–15)
ANION GAP SERPL CALC-SCNC: 10 MMOL/L (ref 5–15)
ANION GAP SERPL CALC-SCNC: 11 MMOL/L (ref 5–15)
ANION GAP SERPL CALC-SCNC: 12 MMOL/L (ref 5–15)
ANION GAP SERPL CALC-SCNC: 12 MMOL/L (ref 5–15)
ANION GAP SERPL CALC-SCNC: 2 MMOL/L (ref 5–15)
ANION GAP SERPL CALC-SCNC: 3 MMOL/L (ref 5–15)
ANION GAP SERPL CALC-SCNC: 4 MMOL/L (ref 5–15)
ANION GAP SERPL CALC-SCNC: 5 MMOL/L (ref 5–15)
ANION GAP SERPL CALC-SCNC: 6 MMOL/L (ref 5–15)
ANION GAP SERPL CALC-SCNC: 7 MMOL/L (ref 5–15)
ANION GAP SERPL CALC-SCNC: 7 MMOL/L (ref 5–15)
ANION GAP SERPL CALC-SCNC: 8 MMOL/L (ref 5–15)
ANION GAP SERPL CALC-SCNC: 9 MMOL/L (ref 5–15)
ANION GAP SERPL CALC-SCNC: 9 MMOL/L (ref 5–15)
APPEARANCE UR: ABNORMAL
APPEARANCE UR: ABNORMAL
AST SERPL-CCNC: 118 U/L (ref 15–37)
AST SERPL-CCNC: 18 U/L (ref 15–37)
AST SERPL-CCNC: 18 U/L (ref 15–37)
AST SERPL-CCNC: 22 U/L (ref 15–37)
AST SERPL-CCNC: 25 U/L (ref 15–37)
AST SERPL-CCNC: 27 U/L (ref 15–37)
AST SERPL-CCNC: 32 U/L (ref 15–37)
AST SERPL-CCNC: 32 U/L (ref 15–37)
AST SERPL-CCNC: 34 U/L (ref 15–37)
AST SERPL-CCNC: 34 U/L (ref 15–37)
AST SERPL-CCNC: 35 U/L (ref 15–37)
AST SERPL-CCNC: 36 U/L (ref 15–37)
AST SERPL-CCNC: 43 U/L (ref 15–37)
AST SERPL-CCNC: 47 U/L (ref 15–37)
AST SERPL-CCNC: 49 U/L (ref 15–37)
AST SERPL-CCNC: 51 U/L (ref 15–37)
AST SERPL-CCNC: 60 U/L (ref 15–37)
AST SERPL-CCNC: 61 U/L (ref 15–37)
AST SERPL-CCNC: 61 U/L (ref 15–37)
AST SERPL-CCNC: 66 U/L (ref 15–37)
AST SERPL-CCNC: 70 U/L (ref 15–37)
AST SERPL-CCNC: 85 U/L (ref 15–37)
AST SERPL-CCNC: 90 U/L (ref 15–37)
AST SERPL-CCNC: 93 U/L (ref 15–37)
AST SERPL-CCNC: 96 U/L (ref 15–37)
ATRIAL RATE: 105 BPM
ATRIAL RATE: 116 BPM
ATRIAL RATE: 288 BPM
ATRIAL RATE: 68 BPM
ATRIAL RATE: 99 BPM
BACTERIA SPEC CULT: ABNORMAL
BACTERIA SPEC CULT: NORMAL
BACTERIA URNS QL MICRO: ABNORMAL /HPF
BACTERIA URNS QL MICRO: ABNORMAL /HPF
BASOPHILS # BLD: 0 K/UL (ref 0–0.1)
BASOPHILS # BLD: 0.1 K/UL (ref 0–0.1)
BASOPHILS NFR BLD: 0 % (ref 0–1)
BASOPHILS NFR BLD: 1 % (ref 0–1)
BILIRUB DIRECT SERPL-MCNC: 1.9 MG/DL (ref 0–0.2)
BILIRUB SERPL-MCNC: 0.4 MG/DL (ref 0.2–1)
BILIRUB SERPL-MCNC: 0.4 MG/DL (ref 0.2–1)
BILIRUB SERPL-MCNC: 0.5 MG/DL (ref 0.2–1)
BILIRUB SERPL-MCNC: 0.5 MG/DL (ref 0.2–1)
BILIRUB SERPL-MCNC: 0.6 MG/DL (ref 0.2–1)
BILIRUB SERPL-MCNC: 0.6 MG/DL (ref 0.2–1)
BILIRUB SERPL-MCNC: 0.7 MG/DL (ref 0.2–1)
BILIRUB SERPL-MCNC: 0.8 MG/DL (ref 0.2–1)
BILIRUB SERPL-MCNC: 0.9 MG/DL (ref 0.2–1)
BILIRUB SERPL-MCNC: 1.1 MG/DL (ref 0.2–1)
BILIRUB SERPL-MCNC: 1.1 MG/DL (ref 0.2–1)
BILIRUB SERPL-MCNC: 1.3 MG/DL (ref 0.2–1)
BILIRUB SERPL-MCNC: 1.5 MG/DL (ref 0.2–1)
BILIRUB SERPL-MCNC: 1.8 MG/DL (ref 0.2–1)
BILIRUB SERPL-MCNC: 2.5 MG/DL (ref 0.2–1)
BILIRUB SERPL-MCNC: 2.6 MG/DL (ref 0.2–1)
BILIRUB SERPL-MCNC: 3.2 MG/DL (ref 0.2–1)
BILIRUB SERPL-MCNC: 3.6 MG/DL (ref 0.2–1)
BILIRUB UR QL CFM: NEGATIVE
BILIRUB UR QL: NEGATIVE
BLD PROD TYP BPU: NORMAL
BLOOD GROUP ANTIBODIES SERPL: NORMAL
BNP SERPL-MCNC: 1562 PG/ML
BNP SERPL-MCNC: 677 PG/ML
BPU ID: NORMAL
BUN SERPL-MCNC: 10 MG/DL (ref 6–20)
BUN SERPL-MCNC: 11 MG/DL (ref 6–20)
BUN SERPL-MCNC: 16 MG/DL (ref 6–20)
BUN SERPL-MCNC: 17 MG/DL (ref 6–20)
BUN SERPL-MCNC: 18 MG/DL (ref 6–20)
BUN SERPL-MCNC: 19 MG/DL (ref 6–20)
BUN SERPL-MCNC: 20 MG/DL (ref 6–20)
BUN SERPL-MCNC: 21 MG/DL (ref 6–20)
BUN SERPL-MCNC: 23 MG/DL (ref 6–20)
BUN SERPL-MCNC: 27 MG/DL (ref 6–20)
BUN SERPL-MCNC: 28 MG/DL (ref 6–20)
BUN SERPL-MCNC: 28 MG/DL (ref 6–20)
BUN SERPL-MCNC: 29 MG/DL (ref 6–20)
BUN SERPL-MCNC: 30 MG/DL (ref 6–20)
BUN SERPL-MCNC: 32 MG/DL (ref 6–20)
BUN SERPL-MCNC: 9 MG/DL (ref 6–20)
BUN/CREAT SERPL: 11 (ref 12–20)
BUN/CREAT SERPL: 14 (ref 12–20)
BUN/CREAT SERPL: 15 (ref 12–20)
BUN/CREAT SERPL: 16 (ref 12–20)
BUN/CREAT SERPL: 16 (ref 12–20)
BUN/CREAT SERPL: 17 (ref 12–20)
BUN/CREAT SERPL: 18 (ref 12–20)
BUN/CREAT SERPL: 18 (ref 12–20)
BUN/CREAT SERPL: 19 (ref 12–20)
BUN/CREAT SERPL: 19 (ref 12–20)
BUN/CREAT SERPL: 21 (ref 12–20)
BUN/CREAT SERPL: 21 (ref 12–20)
BUN/CREAT SERPL: 23 (ref 12–20)
BUN/CREAT SERPL: 23 (ref 12–20)
BUN/CREAT SERPL: 24 (ref 12–20)
BUN/CREAT SERPL: 24 (ref 12–20)
BUN/CREAT SERPL: 26 (ref 12–20)
BUN/CREAT SERPL: 27 (ref 12–20)
BUN/CREAT SERPL: 27 (ref 12–20)
BUN/CREAT SERPL: 6 (ref 12–20)
BUN/CREAT SERPL: 6 (ref 12–20)
BUN/CREAT SERPL: 7 (ref 12–20)
BUN/CREAT SERPL: 8 (ref 12–20)
CALCIUM SERPL-MCNC: 6.9 MG/DL (ref 8.5–10.1)
CALCIUM SERPL-MCNC: 7 MG/DL (ref 8.5–10.1)
CALCIUM SERPL-MCNC: 7.1 MG/DL (ref 8.5–10.1)
CALCIUM SERPL-MCNC: 7.2 MG/DL (ref 8.5–10.1)
CALCIUM SERPL-MCNC: 7.3 MG/DL (ref 8.5–10.1)
CALCIUM SERPL-MCNC: 7.4 MG/DL (ref 8.5–10.1)
CALCIUM SERPL-MCNC: 7.5 MG/DL (ref 8.5–10.1)
CALCIUM SERPL-MCNC: 7.6 MG/DL (ref 8.5–10.1)
CALCIUM SERPL-MCNC: 7.7 MG/DL (ref 8.5–10.1)
CALCIUM SERPL-MCNC: 7.8 MG/DL (ref 8.5–10.1)
CALCIUM SERPL-MCNC: 7.8 MG/DL (ref 8.5–10.1)
CALCIUM SERPL-MCNC: 7.9 MG/DL (ref 8.5–10.1)
CALCIUM SERPL-MCNC: 8 MG/DL (ref 8.5–10.1)
CALCIUM SERPL-MCNC: 8 MG/DL (ref 8.5–10.1)
CALCIUM SERPL-MCNC: 8.1 MG/DL (ref 8.5–10.1)
CALCIUM SERPL-MCNC: 8.1 MG/DL (ref 8.5–10.1)
CALCIUM SERPL-MCNC: 8.2 MG/DL (ref 8.5–10.1)
CALCULATED P AXIS, ECG09: 44 DEGREES
CALCULATED P AXIS, ECG09: 51 DEGREES
CALCULATED P AXIS, ECG09: 65 DEGREES
CALCULATED R AXIS, ECG10: -32 DEGREES
CALCULATED R AXIS, ECG10: -50 DEGREES
CALCULATED R AXIS, ECG10: -53 DEGREES
CALCULATED R AXIS, ECG10: -83 DEGREES
CALCULATED R AXIS, ECG10: -91 DEGREES
CALCULATED T AXIS, ECG11: -178 DEGREES
CALCULATED T AXIS, ECG11: 113 DEGREES
CALCULATED T AXIS, ECG11: 16 DEGREES
CALCULATED T AXIS, ECG11: 29 DEGREES
CALCULATED T AXIS, ECG11: 34 DEGREES
CAMPYLOBACTER SPECIES, DNA: NEGATIVE
CC UR VC: ABNORMAL
CHLORIDE SERPL-SCNC: 100 MMOL/L (ref 97–108)
CHLORIDE SERPL-SCNC: 101 MMOL/L (ref 97–108)
CHLORIDE SERPL-SCNC: 102 MMOL/L (ref 97–108)
CHLORIDE SERPL-SCNC: 103 MMOL/L (ref 97–108)
CHLORIDE SERPL-SCNC: 103 MMOL/L (ref 97–108)
CHLORIDE SERPL-SCNC: 104 MMOL/L (ref 97–108)
CHLORIDE SERPL-SCNC: 105 MMOL/L (ref 97–108)
CHLORIDE SERPL-SCNC: 106 MMOL/L (ref 97–108)
CHLORIDE SERPL-SCNC: 108 MMOL/L (ref 97–108)
CHLORIDE SERPL-SCNC: 109 MMOL/L (ref 97–108)
CHLORIDE SERPL-SCNC: 110 MMOL/L (ref 97–108)
CHLORIDE SERPL-SCNC: 111 MMOL/L (ref 97–108)
CHLORIDE SERPL-SCNC: 112 MMOL/L (ref 97–108)
CHLORIDE SERPL-SCNC: 112 MMOL/L (ref 97–108)
CHLORIDE SERPL-SCNC: 113 MMOL/L (ref 97–108)
CHLORIDE SERPL-SCNC: 114 MMOL/L (ref 97–108)
CHLORIDE SERPL-SCNC: 97 MMOL/L (ref 97–108)
CO2 SERPL-SCNC: 16 MMOL/L (ref 21–32)
CO2 SERPL-SCNC: 17 MMOL/L (ref 21–32)
CO2 SERPL-SCNC: 18 MMOL/L (ref 21–32)
CO2 SERPL-SCNC: 19 MMOL/L (ref 21–32)
CO2 SERPL-SCNC: 20 MMOL/L (ref 21–32)
CO2 SERPL-SCNC: 20 MMOL/L (ref 21–32)
CO2 SERPL-SCNC: 21 MMOL/L (ref 21–32)
CO2 SERPL-SCNC: 22 MMOL/L (ref 21–32)
CO2 SERPL-SCNC: 23 MMOL/L (ref 21–32)
CO2 SERPL-SCNC: 23 MMOL/L (ref 21–32)
CO2 SERPL-SCNC: 24 MMOL/L (ref 21–32)
CO2 SERPL-SCNC: 25 MMOL/L (ref 21–32)
CO2 SERPL-SCNC: 25 MMOL/L (ref 21–32)
CO2 SERPL-SCNC: 26 MMOL/L (ref 21–32)
CO2 SERPL-SCNC: 26 MMOL/L (ref 21–32)
CO2 SERPL-SCNC: 27 MMOL/L (ref 21–32)
CO2 SERPL-SCNC: 29 MMOL/L (ref 21–32)
CO2 SERPL-SCNC: 29 MMOL/L (ref 21–32)
CO2 SERPL-SCNC: 30 MMOL/L (ref 21–32)
CO2 SERPL-SCNC: 32 MMOL/L (ref 21–32)
CO2 SERPL-SCNC: 32 MMOL/L (ref 21–32)
CO2 SERPL-SCNC: 33 MMOL/L (ref 21–32)
COLOR UR: ABNORMAL
COLOR UR: ABNORMAL
COMMENT, HOLDF: NORMAL
CREAT SERPL-MCNC: 0.89 MG/DL (ref 0.55–1.02)
CREAT SERPL-MCNC: 0.9 MG/DL (ref 0.55–1.02)
CREAT SERPL-MCNC: 0.93 MG/DL (ref 0.55–1.02)
CREAT SERPL-MCNC: 0.99 MG/DL (ref 0.55–1.02)
CREAT SERPL-MCNC: 1 MG/DL (ref 0.55–1.02)
CREAT SERPL-MCNC: 1 MG/DL (ref 0.55–1.02)
CREAT SERPL-MCNC: 1.02 MG/DL (ref 0.55–1.02)
CREAT SERPL-MCNC: 1.03 MG/DL (ref 0.55–1.02)
CREAT SERPL-MCNC: 1.05 MG/DL (ref 0.55–1.02)
CREAT SERPL-MCNC: 1.07 MG/DL (ref 0.55–1.02)
CREAT SERPL-MCNC: 1.08 MG/DL (ref 0.55–1.02)
CREAT SERPL-MCNC: 1.09 MG/DL (ref 0.55–1.02)
CREAT SERPL-MCNC: 1.1 MG/DL (ref 0.55–1.02)
CREAT SERPL-MCNC: 1.11 MG/DL (ref 0.55–1.02)
CREAT SERPL-MCNC: 1.13 MG/DL (ref 0.55–1.02)
CREAT SERPL-MCNC: 1.13 MG/DL (ref 0.55–1.02)
CREAT SERPL-MCNC: 1.15 MG/DL (ref 0.55–1.02)
CREAT SERPL-MCNC: 1.15 MG/DL (ref 0.55–1.02)
CREAT SERPL-MCNC: 1.19 MG/DL (ref 0.55–1.02)
CREAT SERPL-MCNC: 1.19 MG/DL (ref 0.55–1.02)
CREAT SERPL-MCNC: 1.2 MG/DL (ref 0.55–1.02)
CREAT SERPL-MCNC: 1.21 MG/DL (ref 0.55–1.02)
CREAT SERPL-MCNC: 1.22 MG/DL (ref 0.55–1.02)
CREAT SERPL-MCNC: 1.22 MG/DL (ref 0.55–1.02)
CREAT SERPL-MCNC: 1.28 MG/DL (ref 0.55–1.02)
CREAT SERPL-MCNC: 1.28 MG/DL (ref 0.55–1.02)
CREAT SERPL-MCNC: 1.39 MG/DL (ref 0.55–1.02)
CREAT SERPL-MCNC: 1.59 MG/DL (ref 0.55–1.02)
CREAT SERPL-MCNC: 2.2 MG/DL (ref 0.55–1.02)
CREAT SERPL-MCNC: 2.47 MG/DL (ref 0.55–1.02)
CREAT SERPL-MCNC: 2.75 MG/DL (ref 0.55–1.02)
CREAT SERPL-MCNC: 2.82 MG/DL (ref 0.55–1.02)
CROSSMATCH RESULT,%XM: NORMAL
CRP SERPL-MCNC: 0.54 MG/DL (ref 0–0.6)
CRP SERPL-MCNC: 7.35 MG/DL (ref 0–0.6)
DIAGNOSIS, 93000: NORMAL
DIFFERENTIAL METHOD BLD: ABNORMAL
DIGOXIN SERPL-MCNC: 1.1 NG/ML (ref 0.9–2)
ECHO AO ROOT DIAM: 3.2 CM
ECHO AV AREA PEAK VELOCITY: 2.57 CM2
ECHO AV AREA VTI: 2.48 CM2
ECHO AV AREA/BSA PEAK VELOCITY: 1.1 CM2/M2
ECHO AV AREA/BSA VTI: 1.1 CM2/M2
ECHO AV MEAN GRADIENT: 9.14 MMHG
ECHO AV MEAN VELOCITY: 139.32 CM/S
ECHO AV PEAK GRADIENT: 19.43 MMHG
ECHO AV PEAK VELOCITY: 220.37 CM/S
ECHO AV VTI: 35.19 CM
ECHO LA MAJOR AXIS: 4.49 CM
ECHO LA MINOR AXIS: 2.01 CM
ECHO LA VOL 2C: 81.42 ML (ref 22–52)
ECHO LA VOL 4C: 66.78 ML (ref 22–52)
ECHO LA VOL BP: 82.33 ML (ref 22–52)
ECHO LA VOL/BSA BIPLANE: 36.78 ML/M2 (ref 16–28)
ECHO LA VOLUME INDEX A2C: 36.38 ML/M2 (ref 16–28)
ECHO LA VOLUME INDEX A4C: 29.84 ML/M2 (ref 16–28)
ECHO LV INTERNAL DIMENSION DIASTOLIC: 4.75 CM (ref 3.9–5.3)
ECHO LV INTERNAL DIMENSION SYSTOLIC: 3.54 CM
ECHO LV IVSD: 1.24 CM (ref 0.6–0.9)
ECHO LV MASS 2D: 248.6 G (ref 67–162)
ECHO LV MASS INDEX 2D: 111.1 G/M2 (ref 43–95)
ECHO LV POSTERIOR WALL DIASTOLIC: 1.41 CM (ref 0.6–0.9)
ECHO LVOT DIAM: 1.95 CM
ECHO LVOT PEAK GRADIENT: 14.02 MMHG
ECHO LVOT PEAK VELOCITY: 187.2 CM/S
ECHO LVOT SV: 87.1 ML
ECHO LVOT VTI: 29.14 CM
ECHO MV A VELOCITY: 131.63 CM/S
ECHO MV E DECELERATION TIME (DT): 0.15 S
ECHO MV E VELOCITY: 136.69 CM/S
ECHO MV E/A RATIO: 1.04
ECHO PV PEAK INSTANTANEOUS GRADIENT SYSTOLIC: 16.3 MMHG
ECHO RV INTERNAL DIMENSION: 4.81 CM
ECHO TV REGURGITANT MAX VELOCITY: 290.47 CM/S
ECHO TV REGURGITANT PEAK GRADIENT: 33.75 MMHG
ENTEROTOXIGEN E COLI, DNA: NEGATIVE
EOSINOPHIL # BLD: 0 K/UL (ref 0–0.4)
EOSINOPHIL # BLD: 0.1 K/UL (ref 0–0.4)
EOSINOPHIL # BLD: 0.2 K/UL (ref 0–0.4)
EOSINOPHIL # BLD: 0.2 K/UL (ref 0–0.4)
EOSINOPHIL # BLD: 0.3 K/UL (ref 0–0.4)
EOSINOPHIL # BLD: 0.4 K/UL (ref 0–0.4)
EOSINOPHIL NFR BLD: 0 % (ref 0–7)
EOSINOPHIL NFR BLD: 1 % (ref 0–7)
EOSINOPHIL NFR BLD: 2 % (ref 0–7)
EOSINOPHIL NFR BLD: 3 % (ref 0–7)
EOSINOPHIL NFR BLD: 4 % (ref 0–7)
EPITH CASTS URNS QL MICRO: ABNORMAL /LPF
EPITH CASTS URNS QL MICRO: ABNORMAL /LPF
ERYTHROCYTE [DISTWIDTH] IN BLOOD BY AUTOMATED COUNT: 16.1 % (ref 11.5–14.5)
ERYTHROCYTE [DISTWIDTH] IN BLOOD BY AUTOMATED COUNT: 16.2 % (ref 11.5–14.5)
ERYTHROCYTE [DISTWIDTH] IN BLOOD BY AUTOMATED COUNT: 16.4 % (ref 11.5–14.5)
ERYTHROCYTE [DISTWIDTH] IN BLOOD BY AUTOMATED COUNT: 16.4 % (ref 11.5–14.5)
ERYTHROCYTE [DISTWIDTH] IN BLOOD BY AUTOMATED COUNT: 16.6 % (ref 11.5–14.5)
ERYTHROCYTE [DISTWIDTH] IN BLOOD BY AUTOMATED COUNT: 16.6 % (ref 11.5–14.5)
ERYTHROCYTE [DISTWIDTH] IN BLOOD BY AUTOMATED COUNT: 16.8 % (ref 11.5–14.5)
ERYTHROCYTE [DISTWIDTH] IN BLOOD BY AUTOMATED COUNT: 17 % (ref 11.5–14.5)
ERYTHROCYTE [DISTWIDTH] IN BLOOD BY AUTOMATED COUNT: 17.1 % (ref 11.5–14.5)
ERYTHROCYTE [DISTWIDTH] IN BLOOD BY AUTOMATED COUNT: 17.2 % (ref 11.5–14.5)
ERYTHROCYTE [DISTWIDTH] IN BLOOD BY AUTOMATED COUNT: 17.2 % (ref 11.5–14.5)
ERYTHROCYTE [DISTWIDTH] IN BLOOD BY AUTOMATED COUNT: 17.3 % (ref 11.5–14.5)
ERYTHROCYTE [DISTWIDTH] IN BLOOD BY AUTOMATED COUNT: 17.3 % (ref 11.5–14.5)
ERYTHROCYTE [DISTWIDTH] IN BLOOD BY AUTOMATED COUNT: 17.5 % (ref 11.5–14.5)
ERYTHROCYTE [DISTWIDTH] IN BLOOD BY AUTOMATED COUNT: 17.6 % (ref 11.5–14.5)
ERYTHROCYTE [DISTWIDTH] IN BLOOD BY AUTOMATED COUNT: 18.1 % (ref 11.5–14.5)
ERYTHROCYTE [DISTWIDTH] IN BLOOD BY AUTOMATED COUNT: 18.1 % (ref 11.5–14.5)
ERYTHROCYTE [DISTWIDTH] IN BLOOD BY AUTOMATED COUNT: 18.3 % (ref 11.5–14.5)
ERYTHROCYTE [DISTWIDTH] IN BLOOD BY AUTOMATED COUNT: 20.3 % (ref 11.5–14.5)
ERYTHROCYTE [DISTWIDTH] IN BLOOD BY AUTOMATED COUNT: 20.9 % (ref 11.5–14.5)
ERYTHROCYTE [DISTWIDTH] IN BLOOD BY AUTOMATED COUNT: 21.5 % (ref 11.5–14.5)
ERYTHROCYTE [DISTWIDTH] IN BLOOD BY AUTOMATED COUNT: 22.5 % (ref 11.5–14.5)
ERYTHROCYTE [DISTWIDTH] IN BLOOD BY AUTOMATED COUNT: 22.9 % (ref 11.5–14.5)
ERYTHROCYTE [DISTWIDTH] IN BLOOD BY AUTOMATED COUNT: 23 % (ref 11.5–14.5)
ERYTHROCYTE [DISTWIDTH] IN BLOOD BY AUTOMATED COUNT: 23.2 % (ref 11.5–14.5)
ERYTHROCYTE [DISTWIDTH] IN BLOOD BY AUTOMATED COUNT: 23.3 % (ref 11.5–14.5)
ERYTHROCYTE [DISTWIDTH] IN BLOOD BY AUTOMATED COUNT: 23.4 % (ref 11.5–14.5)
ERYTHROCYTE [DISTWIDTH] IN BLOOD BY AUTOMATED COUNT: 23.5 % (ref 11.5–14.5)
ERYTHROCYTE [DISTWIDTH] IN BLOOD BY AUTOMATED COUNT: 23.9 % (ref 11.5–14.5)
ERYTHROCYTE [SEDIMENTATION RATE] IN BLOOD: 35 MM/HR (ref 0–30)
EST. AVERAGE GLUCOSE BLD GHB EST-MCNC: 171 MG/DL
FERRITIN SERPL-MCNC: 22 NG/ML (ref 26–388)
GLOBULIN SER CALC-MCNC: 2.5 G/DL (ref 2–4)
GLOBULIN SER CALC-MCNC: 2.6 G/DL (ref 2–4)
GLOBULIN SER CALC-MCNC: 2.6 G/DL (ref 2–4)
GLOBULIN SER CALC-MCNC: 2.7 G/DL (ref 2–4)
GLOBULIN SER CALC-MCNC: 2.8 G/DL (ref 2–4)
GLOBULIN SER CALC-MCNC: 2.8 G/DL (ref 2–4)
GLOBULIN SER CALC-MCNC: 3 G/DL (ref 2–4)
GLOBULIN SER CALC-MCNC: 3.1 G/DL (ref 2–4)
GLOBULIN SER CALC-MCNC: 3.2 G/DL (ref 2–4)
GLOBULIN SER CALC-MCNC: 3.2 G/DL (ref 2–4)
GLOBULIN SER CALC-MCNC: 3.3 G/DL (ref 2–4)
GLOBULIN SER CALC-MCNC: 3.4 G/DL (ref 2–4)
GLOBULIN SER CALC-MCNC: 3.5 G/DL (ref 2–4)
GLOBULIN SER CALC-MCNC: 3.6 G/DL (ref 2–4)
GLOBULIN SER CALC-MCNC: 3.6 G/DL (ref 2–4)
GLOBULIN SER CALC-MCNC: 3.7 G/DL (ref 2–4)
GLOBULIN SER CALC-MCNC: 3.8 G/DL (ref 2–4)
GLOBULIN SER CALC-MCNC: 4 G/DL (ref 2–4)
GLUCOSE BLD STRIP.AUTO-MCNC: 100 MG/DL (ref 65–100)
GLUCOSE BLD STRIP.AUTO-MCNC: 100 MG/DL (ref 65–100)
GLUCOSE BLD STRIP.AUTO-MCNC: 103 MG/DL (ref 65–100)
GLUCOSE BLD STRIP.AUTO-MCNC: 105 MG/DL (ref 65–100)
GLUCOSE BLD STRIP.AUTO-MCNC: 107 MG/DL (ref 65–100)
GLUCOSE BLD STRIP.AUTO-MCNC: 109 MG/DL (ref 65–100)
GLUCOSE BLD STRIP.AUTO-MCNC: 109 MG/DL (ref 65–100)
GLUCOSE BLD STRIP.AUTO-MCNC: 111 MG/DL (ref 65–100)
GLUCOSE BLD STRIP.AUTO-MCNC: 111 MG/DL (ref 65–100)
GLUCOSE BLD STRIP.AUTO-MCNC: 112 MG/DL (ref 65–100)
GLUCOSE BLD STRIP.AUTO-MCNC: 114 MG/DL (ref 65–100)
GLUCOSE BLD STRIP.AUTO-MCNC: 115 MG/DL (ref 65–100)
GLUCOSE BLD STRIP.AUTO-MCNC: 117 MG/DL (ref 65–100)
GLUCOSE BLD STRIP.AUTO-MCNC: 119 MG/DL (ref 65–100)
GLUCOSE BLD STRIP.AUTO-MCNC: 120 MG/DL (ref 65–100)
GLUCOSE BLD STRIP.AUTO-MCNC: 123 MG/DL (ref 65–100)
GLUCOSE BLD STRIP.AUTO-MCNC: 123 MG/DL (ref 65–100)
GLUCOSE BLD STRIP.AUTO-MCNC: 124 MG/DL (ref 65–100)
GLUCOSE BLD STRIP.AUTO-MCNC: 126 MG/DL (ref 65–100)
GLUCOSE BLD STRIP.AUTO-MCNC: 127 MG/DL (ref 65–100)
GLUCOSE BLD STRIP.AUTO-MCNC: 128 MG/DL (ref 65–100)
GLUCOSE BLD STRIP.AUTO-MCNC: 129 MG/DL (ref 65–100)
GLUCOSE BLD STRIP.AUTO-MCNC: 131 MG/DL (ref 65–100)
GLUCOSE BLD STRIP.AUTO-MCNC: 132 MG/DL (ref 65–100)
GLUCOSE BLD STRIP.AUTO-MCNC: 132 MG/DL (ref 65–100)
GLUCOSE BLD STRIP.AUTO-MCNC: 133 MG/DL (ref 65–100)
GLUCOSE BLD STRIP.AUTO-MCNC: 134 MG/DL (ref 65–100)
GLUCOSE BLD STRIP.AUTO-MCNC: 134 MG/DL (ref 65–100)
GLUCOSE BLD STRIP.AUTO-MCNC: 135 MG/DL (ref 65–100)
GLUCOSE BLD STRIP.AUTO-MCNC: 136 MG/DL (ref 65–100)
GLUCOSE BLD STRIP.AUTO-MCNC: 137 MG/DL (ref 65–100)
GLUCOSE BLD STRIP.AUTO-MCNC: 137 MG/DL (ref 65–100)
GLUCOSE BLD STRIP.AUTO-MCNC: 138 MG/DL (ref 65–100)
GLUCOSE BLD STRIP.AUTO-MCNC: 140 MG/DL (ref 65–100)
GLUCOSE BLD STRIP.AUTO-MCNC: 143 MG/DL (ref 65–100)
GLUCOSE BLD STRIP.AUTO-MCNC: 144 MG/DL (ref 65–100)
GLUCOSE BLD STRIP.AUTO-MCNC: 144 MG/DL (ref 65–100)
GLUCOSE BLD STRIP.AUTO-MCNC: 145 MG/DL (ref 65–100)
GLUCOSE BLD STRIP.AUTO-MCNC: 146 MG/DL (ref 65–100)
GLUCOSE BLD STRIP.AUTO-MCNC: 147 MG/DL (ref 65–100)
GLUCOSE BLD STRIP.AUTO-MCNC: 148 MG/DL (ref 65–100)
GLUCOSE BLD STRIP.AUTO-MCNC: 153 MG/DL (ref 65–100)
GLUCOSE BLD STRIP.AUTO-MCNC: 154 MG/DL (ref 65–100)
GLUCOSE BLD STRIP.AUTO-MCNC: 160 MG/DL (ref 65–100)
GLUCOSE BLD STRIP.AUTO-MCNC: 161 MG/DL (ref 65–100)
GLUCOSE BLD STRIP.AUTO-MCNC: 162 MG/DL (ref 65–100)
GLUCOSE BLD STRIP.AUTO-MCNC: 163 MG/DL (ref 65–100)
GLUCOSE BLD STRIP.AUTO-MCNC: 164 MG/DL (ref 65–100)
GLUCOSE BLD STRIP.AUTO-MCNC: 168 MG/DL (ref 65–100)
GLUCOSE BLD STRIP.AUTO-MCNC: 168 MG/DL (ref 65–100)
GLUCOSE BLD STRIP.AUTO-MCNC: 170 MG/DL (ref 65–100)
GLUCOSE BLD STRIP.AUTO-MCNC: 171 MG/DL (ref 65–100)
GLUCOSE BLD STRIP.AUTO-MCNC: 174 MG/DL (ref 65–100)
GLUCOSE BLD STRIP.AUTO-MCNC: 178 MG/DL (ref 65–100)
GLUCOSE BLD STRIP.AUTO-MCNC: 178 MG/DL (ref 65–100)
GLUCOSE BLD STRIP.AUTO-MCNC: 179 MG/DL (ref 65–100)
GLUCOSE BLD STRIP.AUTO-MCNC: 180 MG/DL (ref 65–100)
GLUCOSE BLD STRIP.AUTO-MCNC: 181 MG/DL (ref 65–100)
GLUCOSE BLD STRIP.AUTO-MCNC: 183 MG/DL (ref 65–100)
GLUCOSE BLD STRIP.AUTO-MCNC: 184 MG/DL (ref 65–100)
GLUCOSE BLD STRIP.AUTO-MCNC: 186 MG/DL (ref 65–100)
GLUCOSE BLD STRIP.AUTO-MCNC: 186 MG/DL (ref 65–100)
GLUCOSE BLD STRIP.AUTO-MCNC: 187 MG/DL (ref 65–100)
GLUCOSE BLD STRIP.AUTO-MCNC: 189 MG/DL (ref 65–100)
GLUCOSE BLD STRIP.AUTO-MCNC: 193 MG/DL (ref 65–100)
GLUCOSE BLD STRIP.AUTO-MCNC: 195 MG/DL (ref 65–100)
GLUCOSE BLD STRIP.AUTO-MCNC: 203 MG/DL (ref 65–100)
GLUCOSE BLD STRIP.AUTO-MCNC: 206 MG/DL (ref 65–100)
GLUCOSE BLD STRIP.AUTO-MCNC: 206 MG/DL (ref 65–100)
GLUCOSE BLD STRIP.AUTO-MCNC: 207 MG/DL (ref 65–100)
GLUCOSE BLD STRIP.AUTO-MCNC: 208 MG/DL (ref 65–100)
GLUCOSE BLD STRIP.AUTO-MCNC: 209 MG/DL (ref 65–100)
GLUCOSE BLD STRIP.AUTO-MCNC: 210 MG/DL (ref 65–100)
GLUCOSE BLD STRIP.AUTO-MCNC: 227 MG/DL (ref 65–100)
GLUCOSE BLD STRIP.AUTO-MCNC: 228 MG/DL (ref 65–100)
GLUCOSE BLD STRIP.AUTO-MCNC: 243 MG/DL (ref 65–100)
GLUCOSE BLD STRIP.AUTO-MCNC: 245 MG/DL (ref 65–100)
GLUCOSE BLD STRIP.AUTO-MCNC: 263 MG/DL (ref 65–100)
GLUCOSE BLD STRIP.AUTO-MCNC: 270 MG/DL (ref 65–100)
GLUCOSE BLD STRIP.AUTO-MCNC: 276 MG/DL (ref 65–100)
GLUCOSE BLD STRIP.AUTO-MCNC: 279 MG/DL (ref 65–100)
GLUCOSE BLD STRIP.AUTO-MCNC: 281 MG/DL (ref 65–100)
GLUCOSE BLD STRIP.AUTO-MCNC: 294 MG/DL (ref 65–100)
GLUCOSE BLD STRIP.AUTO-MCNC: 311 MG/DL (ref 65–100)
GLUCOSE BLD STRIP.AUTO-MCNC: 312 MG/DL (ref 65–100)
GLUCOSE BLD STRIP.AUTO-MCNC: 314 MG/DL (ref 65–100)
GLUCOSE BLD STRIP.AUTO-MCNC: 360 MG/DL (ref 65–100)
GLUCOSE BLD STRIP.AUTO-MCNC: 51 MG/DL (ref 65–100)
GLUCOSE BLD STRIP.AUTO-MCNC: 54 MG/DL (ref 65–100)
GLUCOSE BLD STRIP.AUTO-MCNC: 57 MG/DL (ref 65–100)
GLUCOSE BLD STRIP.AUTO-MCNC: 71 MG/DL (ref 65–100)
GLUCOSE BLD STRIP.AUTO-MCNC: 74 MG/DL (ref 65–100)
GLUCOSE BLD STRIP.AUTO-MCNC: 74 MG/DL (ref 65–100)
GLUCOSE BLD STRIP.AUTO-MCNC: 75 MG/DL (ref 65–100)
GLUCOSE BLD STRIP.AUTO-MCNC: 76 MG/DL (ref 65–100)
GLUCOSE BLD STRIP.AUTO-MCNC: 78 MG/DL (ref 65–100)
GLUCOSE BLD STRIP.AUTO-MCNC: 79 MG/DL (ref 65–100)
GLUCOSE BLD STRIP.AUTO-MCNC: 83 MG/DL (ref 65–100)
GLUCOSE BLD STRIP.AUTO-MCNC: 84 MG/DL (ref 65–100)
GLUCOSE BLD STRIP.AUTO-MCNC: 84 MG/DL (ref 65–100)
GLUCOSE BLD STRIP.AUTO-MCNC: 85 MG/DL (ref 65–100)
GLUCOSE BLD STRIP.AUTO-MCNC: 86 MG/DL (ref 65–100)
GLUCOSE BLD STRIP.AUTO-MCNC: 89 MG/DL (ref 65–100)
GLUCOSE BLD STRIP.AUTO-MCNC: 92 MG/DL (ref 65–100)
GLUCOSE BLD STRIP.AUTO-MCNC: 96 MG/DL (ref 65–100)
GLUCOSE BLD STRIP.AUTO-MCNC: 97 MG/DL (ref 65–100)
GLUCOSE BLD STRIP.AUTO-MCNC: 99 MG/DL (ref 65–100)
GLUCOSE BLD STRIP.AUTO-MCNC: 99 MG/DL (ref 65–100)
GLUCOSE BLD STRIP.AUTO-MCNC: NORMAL MG/DL (ref 65–100)
GLUCOSE BLD STRIP.AUTO-MCNC: NORMAL MG/DL (ref 65–100)
GLUCOSE SERPL-MCNC: 101 MG/DL (ref 65–100)
GLUCOSE SERPL-MCNC: 115 MG/DL (ref 65–100)
GLUCOSE SERPL-MCNC: 115 MG/DL (ref 65–100)
GLUCOSE SERPL-MCNC: 119 MG/DL (ref 65–100)
GLUCOSE SERPL-MCNC: 125 MG/DL (ref 65–100)
GLUCOSE SERPL-MCNC: 127 MG/DL (ref 65–100)
GLUCOSE SERPL-MCNC: 131 MG/DL (ref 65–100)
GLUCOSE SERPL-MCNC: 132 MG/DL (ref 65–100)
GLUCOSE SERPL-MCNC: 134 MG/DL (ref 65–100)
GLUCOSE SERPL-MCNC: 137 MG/DL (ref 65–100)
GLUCOSE SERPL-MCNC: 137 MG/DL (ref 65–100)
GLUCOSE SERPL-MCNC: 138 MG/DL (ref 65–100)
GLUCOSE SERPL-MCNC: 151 MG/DL (ref 65–100)
GLUCOSE SERPL-MCNC: 158 MG/DL (ref 65–100)
GLUCOSE SERPL-MCNC: 166 MG/DL (ref 65–100)
GLUCOSE SERPL-MCNC: 167 MG/DL (ref 65–100)
GLUCOSE SERPL-MCNC: 168 MG/DL (ref 65–100)
GLUCOSE SERPL-MCNC: 171 MG/DL (ref 65–100)
GLUCOSE SERPL-MCNC: 173 MG/DL (ref 65–100)
GLUCOSE SERPL-MCNC: 174 MG/DL (ref 65–100)
GLUCOSE SERPL-MCNC: 177 MG/DL (ref 65–100)
GLUCOSE SERPL-MCNC: 180 MG/DL (ref 65–100)
GLUCOSE SERPL-MCNC: 190 MG/DL (ref 65–100)
GLUCOSE SERPL-MCNC: 200 MG/DL (ref 65–100)
GLUCOSE SERPL-MCNC: 204 MG/DL (ref 65–100)
GLUCOSE SERPL-MCNC: 214 MG/DL (ref 65–100)
GLUCOSE SERPL-MCNC: 253 MG/DL (ref 65–100)
GLUCOSE SERPL-MCNC: 278 MG/DL (ref 65–100)
GLUCOSE SERPL-MCNC: 295 MG/DL (ref 65–100)
GLUCOSE SERPL-MCNC: 87 MG/DL (ref 65–100)
GLUCOSE SERPL-MCNC: 93 MG/DL (ref 65–100)
GLUCOSE SERPL-MCNC: 94 MG/DL (ref 65–100)
GLUCOSE UR STRIP.AUTO-MCNC: NEGATIVE MG/DL
GLUCOSE UR STRIP.AUTO-MCNC: NEGATIVE MG/DL
GRAM STN SPEC: NORMAL
GRAM STN SPEC: NORMAL
HAV IGM SER QL: NONREACTIVE
HBA1C MFR BLD: 7.6 % (ref 4–5.6)
HBV CORE IGM SER QL: NONREACTIVE
HBV SURFACE AG SER QL: <0.1 INDEX
HBV SURFACE AG SER QL: NEGATIVE
HCT VFR BLD AUTO: 19.6 % (ref 35–47)
HCT VFR BLD AUTO: 21.7 % (ref 35–47)
HCT VFR BLD AUTO: 24.1 % (ref 35–47)
HCT VFR BLD AUTO: 24.6 % (ref 35–47)
HCT VFR BLD AUTO: 24.8 % (ref 35–47)
HCT VFR BLD AUTO: 26.6 % (ref 35–47)
HCT VFR BLD AUTO: 27.4 % (ref 35–47)
HCT VFR BLD AUTO: 27.7 % (ref 35–47)
HCT VFR BLD AUTO: 29.3 % (ref 35–47)
HCT VFR BLD AUTO: 29.6 % (ref 35–47)
HCT VFR BLD AUTO: 30.1 % (ref 35–47)
HCT VFR BLD AUTO: 31.1 % (ref 35–47)
HCT VFR BLD AUTO: 31.5 % (ref 35–47)
HCT VFR BLD AUTO: 31.6 % (ref 35–47)
HCT VFR BLD AUTO: 32 % (ref 35–47)
HCT VFR BLD AUTO: 32.5 % (ref 35–47)
HCT VFR BLD AUTO: 32.6 % (ref 35–47)
HCT VFR BLD AUTO: 32.9 % (ref 35–47)
HCT VFR BLD AUTO: 34.4 % (ref 35–47)
HCT VFR BLD AUTO: 34.4 % (ref 35–47)
HCT VFR BLD AUTO: 35.2 % (ref 35–47)
HCT VFR BLD AUTO: 35.8 % (ref 35–47)
HCT VFR BLD AUTO: 36.4 % (ref 35–47)
HCT VFR BLD AUTO: 36.7 % (ref 35–47)
HCT VFR BLD AUTO: 37 % (ref 35–47)
HCT VFR BLD AUTO: 37.1 % (ref 35–47)
HCT VFR BLD AUTO: 38.4 % (ref 35–47)
HCT VFR BLD AUTO: 38.9 % (ref 35–47)
HCT VFR BLD AUTO: 44.3 % (ref 35–47)
HCV AB SERPL QL IA: NONREACTIVE
HCV COMMENT,HCGAC: NORMAL
HEALTH STATUS, XMCV2T: NORMAL
HEMOCCULT STL QL: NEGATIVE
HGB BLD-MCNC: 10 G/DL (ref 11.5–16)
HGB BLD-MCNC: 10.1 G/DL (ref 11.5–16)
HGB BLD-MCNC: 10.9 G/DL (ref 11.5–16)
HGB BLD-MCNC: 11 G/DL (ref 11.5–16)
HGB BLD-MCNC: 11.2 G/DL (ref 11.5–16)
HGB BLD-MCNC: 11.3 G/DL (ref 11.5–16)
HGB BLD-MCNC: 11.5 G/DL (ref 11.5–16)
HGB BLD-MCNC: 11.6 G/DL (ref 11.5–16)
HGB BLD-MCNC: 11.8 G/DL (ref 11.5–16)
HGB BLD-MCNC: 11.8 G/DL (ref 11.5–16)
HGB BLD-MCNC: 11.9 G/DL (ref 11.5–16)
HGB BLD-MCNC: 12 G/DL (ref 11.5–16)
HGB BLD-MCNC: 13.6 G/DL (ref 11.5–16)
HGB BLD-MCNC: 5.1 G/DL (ref 11.5–16)
HGB BLD-MCNC: 6.1 G/DL (ref 11.5–16)
HGB BLD-MCNC: 6.7 G/DL (ref 11.5–16)
HGB BLD-MCNC: 7 G/DL (ref 11.5–16)
HGB BLD-MCNC: 7.1 G/DL (ref 11.5–16)
HGB BLD-MCNC: 7.6 G/DL (ref 11.5–16)
HGB BLD-MCNC: 7.9 G/DL (ref 11.5–16)
HGB BLD-MCNC: 8 G/DL (ref 11.5–16)
HGB BLD-MCNC: 8 G/DL (ref 11.5–16)
HGB BLD-MCNC: 8.3 G/DL (ref 11.5–16)
HGB BLD-MCNC: 8.6 G/DL (ref 11.5–16)
HGB BLD-MCNC: 8.7 G/DL (ref 11.5–16)
HGB BLD-MCNC: 8.8 G/DL (ref 11.5–16)
HGB BLD-MCNC: 9.4 G/DL (ref 11.5–16)
HGB BLD-MCNC: 9.4 G/DL (ref 11.5–16)
HGB BLD-MCNC: 9.9 G/DL (ref 11.5–16)
HGB BLD-MCNC: 9.9 G/DL (ref 11.5–16)
HGB UR QL STRIP: NEGATIVE
HGB UR QL STRIP: NEGATIVE
HISTORY CHECKED?,CKHIST: NORMAL
HYALINE CASTS URNS QL MICRO: ABNORMAL /LPF (ref 0–5)
HYALINE CASTS URNS QL MICRO: ABNORMAL /LPF (ref 0–5)
IMM GRANULOCYTES # BLD AUTO: 0 K/UL
IMM GRANULOCYTES # BLD AUTO: 0 K/UL (ref 0–0.04)
IMM GRANULOCYTES # BLD AUTO: 0.1 K/UL (ref 0–0.04)
IMM GRANULOCYTES # BLD AUTO: 0.2 K/UL (ref 0–0.04)
IMM GRANULOCYTES # BLD AUTO: 0.3 K/UL (ref 0–0.04)
IMM GRANULOCYTES # BLD AUTO: 0.4 K/UL (ref 0–0.04)
IMM GRANULOCYTES # BLD AUTO: 0.4 K/UL (ref 0–0.04)
IMM GRANULOCYTES NFR BLD AUTO: 0 %
IMM GRANULOCYTES NFR BLD AUTO: 0 % (ref 0–0.5)
IMM GRANULOCYTES NFR BLD AUTO: 1 % (ref 0–0.5)
IMM GRANULOCYTES NFR BLD AUTO: 2 % (ref 0–0.5)
INR PPP: 1.1 (ref 0.9–1.1)
IRON SATN MFR SERPL: 5 % (ref 20–50)
IRON SERPL-MCNC: 16 UG/DL (ref 35–150)
KETONES UR QL STRIP.AUTO: NEGATIVE MG/DL
KETONES UR QL STRIP.AUTO: NEGATIVE MG/DL
LACTATE SERPL-SCNC: 1 MMOL/L (ref 0.4–2)
LACTATE SERPL-SCNC: 1.2 MMOL/L (ref 0.4–2)
LACTATE SERPL-SCNC: 1.6 MMOL/L (ref 0.4–2)
LACTATE SERPL-SCNC: 2 MMOL/L (ref 0.4–2)
LACTATE SERPL-SCNC: 4.4 MMOL/L (ref 0.4–2)
LEUKOCYTE ESTERASE UR QL STRIP.AUTO: ABNORMAL
LEUKOCYTE ESTERASE UR QL STRIP.AUTO: NEGATIVE
LVOT MG: 6.15 MMHG
LYMPHOCYTES # BLD: 0.4 K/UL (ref 0.8–3.5)
LYMPHOCYTES # BLD: 0.4 K/UL (ref 0.8–3.5)
LYMPHOCYTES # BLD: 0.5 K/UL (ref 0.8–3.5)
LYMPHOCYTES # BLD: 0.6 K/UL (ref 0.8–3.5)
LYMPHOCYTES # BLD: 0.6 K/UL (ref 0.8–3.5)
LYMPHOCYTES # BLD: 0.7 K/UL (ref 0.8–3.5)
LYMPHOCYTES # BLD: 0.8 K/UL (ref 0.8–3.5)
LYMPHOCYTES # BLD: 0.9 K/UL (ref 0.8–3.5)
LYMPHOCYTES # BLD: 1 K/UL (ref 0.8–3.5)
LYMPHOCYTES # BLD: 1.1 K/UL (ref 0.8–3.5)
LYMPHOCYTES # BLD: 1.2 K/UL (ref 0.8–3.5)
LYMPHOCYTES # BLD: 1.2 K/UL (ref 0.8–3.5)
LYMPHOCYTES # BLD: 1.3 K/UL (ref 0.8–3.5)
LYMPHOCYTES # BLD: 1.3 K/UL (ref 0.8–3.5)
LYMPHOCYTES # BLD: 1.4 K/UL (ref 0.8–3.5)
LYMPHOCYTES # BLD: 1.5 K/UL (ref 0.8–3.5)
LYMPHOCYTES # BLD: 1.5 K/UL (ref 0.8–3.5)
LYMPHOCYTES # BLD: 1.7 K/UL (ref 0.8–3.5)
LYMPHOCYTES # BLD: 1.9 K/UL (ref 0.8–3.5)
LYMPHOCYTES # BLD: 2 K/UL (ref 0.8–3.5)
LYMPHOCYTES # BLD: 2.1 K/UL (ref 0.8–3.5)
LYMPHOCYTES NFR BLD: 10 % (ref 12–49)
LYMPHOCYTES NFR BLD: 12 % (ref 12–49)
LYMPHOCYTES NFR BLD: 14 % (ref 12–49)
LYMPHOCYTES NFR BLD: 17 % (ref 12–49)
LYMPHOCYTES NFR BLD: 19 % (ref 12–49)
LYMPHOCYTES NFR BLD: 19 % (ref 12–49)
LYMPHOCYTES NFR BLD: 20 % (ref 12–49)
LYMPHOCYTES NFR BLD: 25 % (ref 12–49)
LYMPHOCYTES NFR BLD: 3 % (ref 12–49)
LYMPHOCYTES NFR BLD: 4 % (ref 12–49)
LYMPHOCYTES NFR BLD: 4 % (ref 12–49)
LYMPHOCYTES NFR BLD: 5 % (ref 12–49)
LYMPHOCYTES NFR BLD: 7 % (ref 12–49)
LYMPHOCYTES NFR BLD: 8 % (ref 12–49)
LYMPHOCYTES NFR BLD: 9 % (ref 12–49)
MAGNESIUM SERPL-MCNC: 1.4 MG/DL (ref 1.6–2.4)
MAGNESIUM SERPL-MCNC: 1.5 MG/DL (ref 1.6–2.4)
MAGNESIUM SERPL-MCNC: 1.5 MG/DL (ref 1.6–2.4)
MAGNESIUM SERPL-MCNC: 2 MG/DL (ref 1.6–2.4)
MAGNESIUM SERPL-MCNC: 2 MG/DL (ref 1.6–2.4)
MAGNESIUM SERPL-MCNC: 2.2 MG/DL (ref 1.6–2.4)
MCH RBC QN AUTO: 20.6 PG (ref 26–34)
MCH RBC QN AUTO: 22.8 PG (ref 26–34)
MCH RBC QN AUTO: 23.6 PG (ref 26–34)
MCH RBC QN AUTO: 23.7 PG (ref 26–34)
MCH RBC QN AUTO: 23.9 PG (ref 26–34)
MCH RBC QN AUTO: 24.3 PG (ref 26–34)
MCH RBC QN AUTO: 24.6 PG (ref 26–34)
MCH RBC QN AUTO: 24.7 PG (ref 26–34)
MCH RBC QN AUTO: 25.1 PG (ref 26–34)
MCH RBC QN AUTO: 25.2 PG (ref 26–34)
MCH RBC QN AUTO: 25.5 PG (ref 26–34)
MCH RBC QN AUTO: 27 PG (ref 26–34)
MCH RBC QN AUTO: 27.4 PG (ref 26–34)
MCH RBC QN AUTO: 27.4 PG (ref 26–34)
MCH RBC QN AUTO: 29.2 PG (ref 26–34)
MCH RBC QN AUTO: 29.3 PG (ref 26–34)
MCH RBC QN AUTO: 29.5 PG (ref 26–34)
MCH RBC QN AUTO: 29.6 PG (ref 26–34)
MCH RBC QN AUTO: 29.7 PG (ref 26–34)
MCH RBC QN AUTO: 29.9 PG (ref 26–34)
MCH RBC QN AUTO: 30 PG (ref 26–34)
MCH RBC QN AUTO: 30.2 PG (ref 26–34)
MCH RBC QN AUTO: 30.3 PG (ref 26–34)
MCH RBC QN AUTO: 30.4 PG (ref 26–34)
MCH RBC QN AUTO: 30.5 PG (ref 26–34)
MCH RBC QN AUTO: 30.9 PG (ref 26–34)
MCHC RBC AUTO-ENTMCNC: 26 G/DL (ref 30–36.5)
MCHC RBC AUTO-ENTMCNC: 27.1 G/DL (ref 30–36.5)
MCHC RBC AUTO-ENTMCNC: 27.3 G/DL (ref 30–36.5)
MCHC RBC AUTO-ENTMCNC: 27.5 G/DL (ref 30–36.5)
MCHC RBC AUTO-ENTMCNC: 27.5 G/DL (ref 30–36.5)
MCHC RBC AUTO-ENTMCNC: 27.6 G/DL (ref 30–36.5)
MCHC RBC AUTO-ENTMCNC: 27.7 G/DL (ref 30–36.5)
MCHC RBC AUTO-ENTMCNC: 27.8 G/DL (ref 30–36.5)
MCHC RBC AUTO-ENTMCNC: 28.1 G/DL (ref 30–36.5)
MCHC RBC AUTO-ENTMCNC: 28.1 G/DL (ref 30–36.5)
MCHC RBC AUTO-ENTMCNC: 28.5 G/DL (ref 30–36.5)
MCHC RBC AUTO-ENTMCNC: 28.6 G/DL (ref 30–36.5)
MCHC RBC AUTO-ENTMCNC: 28.8 G/DL (ref 30–36.5)
MCHC RBC AUTO-ENTMCNC: 28.8 G/DL (ref 30–36.5)
MCHC RBC AUTO-ENTMCNC: 28.9 G/DL (ref 30–36.5)
MCHC RBC AUTO-ENTMCNC: 29.1 G/DL (ref 30–36.5)
MCHC RBC AUTO-ENTMCNC: 29.4 G/DL (ref 30–36.5)
MCHC RBC AUTO-ENTMCNC: 29.6 G/DL (ref 30–36.5)
MCHC RBC AUTO-ENTMCNC: 29.7 G/DL (ref 30–36.5)
MCHC RBC AUTO-ENTMCNC: 29.8 G/DL (ref 30–36.5)
MCHC RBC AUTO-ENTMCNC: 30.1 G/DL (ref 30–36.5)
MCHC RBC AUTO-ENTMCNC: 30.2 G/DL (ref 30–36.5)
MCHC RBC AUTO-ENTMCNC: 30.4 G/DL (ref 30–36.5)
MCHC RBC AUTO-ENTMCNC: 30.7 G/DL (ref 30–36.5)
MCHC RBC AUTO-ENTMCNC: 30.8 G/DL (ref 30–36.5)
MCHC RBC AUTO-ENTMCNC: 31 G/DL (ref 30–36.5)
MCHC RBC AUTO-ENTMCNC: 31.1 G/DL (ref 30–36.5)
MCV RBC AUTO: 100.3 FL (ref 80–99)
MCV RBC AUTO: 100.6 FL (ref 80–99)
MCV RBC AUTO: 101.2 FL (ref 80–99)
MCV RBC AUTO: 102.2 FL (ref 80–99)
MCV RBC AUTO: 103.6 FL (ref 80–99)
MCV RBC AUTO: 79 FL (ref 80–99)
MCV RBC AUTO: 81.3 FL (ref 80–99)
MCV RBC AUTO: 82.4 FL (ref 80–99)
MCV RBC AUTO: 84 FL (ref 80–99)
MCV RBC AUTO: 85.2 FL (ref 80–99)
MCV RBC AUTO: 85.8 FL (ref 80–99)
MCV RBC AUTO: 86.8 FL (ref 80–99)
MCV RBC AUTO: 87 FL (ref 80–99)
MCV RBC AUTO: 89.1 FL (ref 80–99)
MCV RBC AUTO: 89.3 FL (ref 80–99)
MCV RBC AUTO: 89.6 FL (ref 80–99)
MCV RBC AUTO: 91.2 FL (ref 80–99)
MCV RBC AUTO: 91.4 FL (ref 80–99)
MCV RBC AUTO: 92.6 FL (ref 80–99)
MCV RBC AUTO: 94.2 FL (ref 80–99)
MCV RBC AUTO: 95 FL (ref 80–99)
MCV RBC AUTO: 95.3 FL (ref 80–99)
MCV RBC AUTO: 95.5 FL (ref 80–99)
MCV RBC AUTO: 95.6 FL (ref 80–99)
MCV RBC AUTO: 96.2 FL (ref 80–99)
MCV RBC AUTO: 96.5 FL (ref 80–99)
MCV RBC AUTO: 96.6 FL (ref 80–99)
MCV RBC AUTO: 96.7 FL (ref 80–99)
MCV RBC AUTO: 97.2 FL (ref 80–99)
MCV RBC AUTO: 97.5 FL (ref 80–99)
MCV RBC AUTO: 97.7 FL (ref 80–99)
METAMYELOCYTES NFR BLD MANUAL: 1 %
METAMYELOCYTES NFR BLD MANUAL: 2 %
METAMYELOCYTES NFR BLD MANUAL: 3 %
METAMYELOCYTES NFR BLD MANUAL: 3 %
MONOCYTES # BLD: 0.3 K/UL (ref 0–1)
MONOCYTES # BLD: 0.3 K/UL (ref 0–1)
MONOCYTES # BLD: 0.5 K/UL (ref 0–1)
MONOCYTES # BLD: 0.6 K/UL (ref 0–1)
MONOCYTES # BLD: 0.7 K/UL (ref 0–1)
MONOCYTES # BLD: 0.8 K/UL (ref 0–1)
MONOCYTES # BLD: 0.9 K/UL (ref 0–1)
MONOCYTES # BLD: 1 K/UL (ref 0–1)
MONOCYTES # BLD: 1.1 K/UL (ref 0–1)
MONOCYTES # BLD: 1.2 K/UL (ref 0–1)
MONOCYTES # BLD: 1.2 K/UL (ref 0–1)
MONOCYTES # BLD: 1.4 K/UL (ref 0–1)
MONOCYTES # BLD: 1.4 K/UL (ref 0–1)
MONOCYTES NFR BLD: 10 % (ref 5–13)
MONOCYTES NFR BLD: 10 % (ref 5–13)
MONOCYTES NFR BLD: 11 % (ref 5–13)
MONOCYTES NFR BLD: 11 % (ref 5–13)
MONOCYTES NFR BLD: 12 % (ref 5–13)
MONOCYTES NFR BLD: 14 % (ref 5–13)
MONOCYTES NFR BLD: 3 % (ref 5–13)
MONOCYTES NFR BLD: 3 % (ref 5–13)
MONOCYTES NFR BLD: 4 % (ref 5–13)
MONOCYTES NFR BLD: 5 % (ref 5–13)
MONOCYTES NFR BLD: 6 % (ref 5–13)
MONOCYTES NFR BLD: 7 % (ref 5–13)
MONOCYTES NFR BLD: 8 % (ref 5–13)
MONOCYTES NFR BLD: 9 % (ref 5–13)
MYELOCYTES NFR BLD MANUAL: 1 %
MYELOCYTES NFR BLD MANUAL: 1 %
MYELOCYTES NFR BLD MANUAL: 2 %
NEUTS BAND NFR BLD MANUAL: 1 % (ref 0–6)
NEUTS BAND NFR BLD MANUAL: 2 % (ref 0–6)
NEUTS BAND NFR BLD MANUAL: 2 % (ref 0–6)
NEUTS BAND NFR BLD MANUAL: 3 %
NEUTS BAND NFR BLD MANUAL: 3 %
NEUTS BAND NFR BLD MANUAL: 4 %
NEUTS BAND NFR BLD MANUAL: 4 % (ref 0–6)
NEUTS BAND NFR BLD MANUAL: 5 % (ref 0–6)
NEUTS BAND NFR BLD MANUAL: 5 % (ref 0–6)
NEUTS BAND NFR BLD MANUAL: 7 % (ref 0–6)
NEUTS SEG # BLD: 10.8 K/UL (ref 1.8–8)
NEUTS SEG # BLD: 11.2 K/UL (ref 1.8–8)
NEUTS SEG # BLD: 11.2 K/UL (ref 1.8–8)
NEUTS SEG # BLD: 11.3 K/UL (ref 1.8–8)
NEUTS SEG # BLD: 13.1 K/UL (ref 1.8–8)
NEUTS SEG # BLD: 13.6 K/UL (ref 1.8–8)
NEUTS SEG # BLD: 14.3 K/UL (ref 1.8–8)
NEUTS SEG # BLD: 14.4 K/UL (ref 1.8–8)
NEUTS SEG # BLD: 14.4 K/UL (ref 1.8–8)
NEUTS SEG # BLD: 16.7 K/UL (ref 1.8–8)
NEUTS SEG # BLD: 18.5 K/UL (ref 1.8–8)
NEUTS SEG # BLD: 4.5 K/UL (ref 1.8–8)
NEUTS SEG # BLD: 4.9 K/UL (ref 1.8–8)
NEUTS SEG # BLD: 5 K/UL (ref 1.8–8)
NEUTS SEG # BLD: 5.6 K/UL (ref 1.8–8)
NEUTS SEG # BLD: 5.7 K/UL (ref 1.8–8)
NEUTS SEG # BLD: 6.1 K/UL (ref 1.8–8)
NEUTS SEG # BLD: 6.2 K/UL (ref 1.8–8)
NEUTS SEG # BLD: 7 K/UL (ref 1.8–8)
NEUTS SEG # BLD: 7.5 K/UL (ref 1.8–8)
NEUTS SEG # BLD: 7.6 K/UL (ref 1.8–8)
NEUTS SEG # BLD: 8.2 K/UL (ref 1.8–8)
NEUTS SEG # BLD: 8.5 K/UL (ref 1.8–8)
NEUTS SEG # BLD: 8.6 K/UL (ref 1.8–8)
NEUTS SEG # BLD: 8.6 K/UL (ref 1.8–8)
NEUTS SEG # BLD: 8.9 K/UL (ref 1.8–8)
NEUTS SEG # BLD: 8.9 K/UL (ref 1.8–8)
NEUTS SEG # BLD: 9.4 K/UL (ref 1.8–8)
NEUTS SEG # BLD: 9.4 K/UL (ref 1.8–8)
NEUTS SEG # BLD: 9.9 K/UL (ref 1.8–8)
NEUTS SEG NFR BLD: 57 % (ref 32–75)
NEUTS SEG NFR BLD: 60 % (ref 32–75)
NEUTS SEG NFR BLD: 63 % (ref 32–75)
NEUTS SEG NFR BLD: 64 % (ref 32–75)
NEUTS SEG NFR BLD: 66 % (ref 32–75)
NEUTS SEG NFR BLD: 75 % (ref 32–75)
NEUTS SEG NFR BLD: 76 % (ref 32–75)
NEUTS SEG NFR BLD: 76 % (ref 32–75)
NEUTS SEG NFR BLD: 77 % (ref 32–75)
NEUTS SEG NFR BLD: 78 % (ref 32–75)
NEUTS SEG NFR BLD: 80 % (ref 32–75)
NEUTS SEG NFR BLD: 80 % (ref 32–75)
NEUTS SEG NFR BLD: 81 % (ref 32–75)
NEUTS SEG NFR BLD: 82 % (ref 32–75)
NEUTS SEG NFR BLD: 83 % (ref 32–75)
NEUTS SEG NFR BLD: 84 % (ref 32–75)
NEUTS SEG NFR BLD: 85 % (ref 32–75)
NEUTS SEG NFR BLD: 86 % (ref 32–75)
NEUTS SEG NFR BLD: 86 % (ref 32–75)
NEUTS SEG NFR BLD: 87 % (ref 32–75)
NEUTS SEG NFR BLD: 88 % (ref 32–75)
NEUTS SEG NFR BLD: 89 % (ref 32–75)
NEUTS SEG NFR BLD: 90 % (ref 32–75)
NEUTS SEG NFR BLD: 91 % (ref 32–75)
NITRITE UR QL STRIP.AUTO: NEGATIVE
NITRITE UR QL STRIP.AUTO: POSITIVE
NRBC # BLD: 0 K/UL (ref 0–0.01)
NRBC # BLD: 0.02 K/UL (ref 0–0.01)
NRBC # BLD: 0.02 K/UL (ref 0–0.01)
NRBC # BLD: 0.03 K/UL (ref 0–0.01)
NRBC # BLD: 0.07 K/UL (ref 0–0.01)
NRBC # BLD: 0.09 K/UL (ref 0–0.01)
NRBC # BLD: 0.1 K/UL (ref 0–0.01)
NRBC # BLD: 0.24 K/UL (ref 0–0.01)
NRBC # BLD: 0.34 K/UL (ref 0–0.01)
NRBC BLD-RTO: 0 PER 100 WBC
NRBC BLD-RTO: 0.1 PER 100 WBC
NRBC BLD-RTO: 0.1 PER 100 WBC
NRBC BLD-RTO: 0.2 PER 100 WBC
NRBC BLD-RTO: 0.4 PER 100 WBC
NRBC BLD-RTO: 0.4 PER 100 WBC
NRBC BLD-RTO: 0.6 PER 100 WBC
NRBC BLD-RTO: 1.4 PER 100 WBC
NRBC BLD-RTO: 1.8 PER 100 WBC
P SHIGELLOIDES DNA STL QL NAA+PROBE: NEGATIVE
P-R INTERVAL, ECG05: 134 MS
P-R INTERVAL, ECG05: 140 MS
P-R INTERVAL, ECG05: 144 MS
PATH REV BLD -IMP: ABNORMAL
PH UR STRIP: 5 [PH] (ref 5–8)
PH UR STRIP: 5 [PH] (ref 5–8)
PLATELET # BLD AUTO: 197 K/UL (ref 150–400)
PLATELET # BLD AUTO: 200 K/UL (ref 150–400)
PLATELET # BLD AUTO: 220 K/UL (ref 150–400)
PLATELET # BLD AUTO: 225 K/UL (ref 150–400)
PLATELET # BLD AUTO: 228 K/UL (ref 150–400)
PLATELET # BLD AUTO: 232 K/UL (ref 150–400)
PLATELET # BLD AUTO: 237 K/UL (ref 150–400)
PLATELET # BLD AUTO: 238 K/UL (ref 150–400)
PLATELET # BLD AUTO: 246 K/UL (ref 150–400)
PLATELET # BLD AUTO: 247 K/UL (ref 150–400)
PLATELET # BLD AUTO: 250 K/UL (ref 150–400)
PLATELET # BLD AUTO: 250 K/UL (ref 150–400)
PLATELET # BLD AUTO: 252 K/UL (ref 150–400)
PLATELET # BLD AUTO: 257 K/UL (ref 150–400)
PLATELET # BLD AUTO: 260 K/UL (ref 150–400)
PLATELET # BLD AUTO: 261 K/UL (ref 150–400)
PLATELET # BLD AUTO: 268 K/UL (ref 150–400)
PLATELET # BLD AUTO: 297 K/UL (ref 150–400)
PLATELET # BLD AUTO: 303 K/UL (ref 150–400)
PLATELET # BLD AUTO: 308 K/UL (ref 150–400)
PLATELET # BLD AUTO: 313 K/UL (ref 150–400)
PLATELET # BLD AUTO: 325 K/UL (ref 150–400)
PLATELET # BLD AUTO: 336 K/UL (ref 150–400)
PLATELET # BLD AUTO: 348 K/UL (ref 150–400)
PLATELET # BLD AUTO: 370 K/UL (ref 150–400)
PLATELET # BLD AUTO: 370 K/UL (ref 150–400)
PLATELET # BLD AUTO: 383 K/UL (ref 150–400)
PLATELET # BLD AUTO: 394 K/UL (ref 150–400)
PLATELET # BLD AUTO: 408 K/UL (ref 150–400)
PLATELET # BLD AUTO: 419 K/UL (ref 150–400)
PLATELET # BLD AUTO: 420 K/UL (ref 150–400)
PMV BLD AUTO: 10 FL (ref 8.9–12.9)
PMV BLD AUTO: 10.1 FL (ref 8.9–12.9)
PMV BLD AUTO: 10.1 FL (ref 8.9–12.9)
PMV BLD AUTO: 10.2 FL (ref 8.9–12.9)
PMV BLD AUTO: 10.3 FL (ref 8.9–12.9)
PMV BLD AUTO: 10.4 FL (ref 8.9–12.9)
PMV BLD AUTO: 10.5 FL (ref 8.9–12.9)
PMV BLD AUTO: 10.5 FL (ref 8.9–12.9)
PMV BLD AUTO: 10.7 FL (ref 8.9–12.9)
PMV BLD AUTO: 10.7 FL (ref 8.9–12.9)
PMV BLD AUTO: 10.9 FL (ref 8.9–12.9)
PMV BLD AUTO: 11 FL (ref 8.9–12.9)
PMV BLD AUTO: 11.2 FL (ref 8.9–12.9)
PMV BLD AUTO: 11.4 FL (ref 8.9–12.9)
PMV BLD AUTO: 11.9 FL (ref 8.9–12.9)
PMV BLD AUTO: 9 FL (ref 8.9–12.9)
PMV BLD AUTO: 9.2 FL (ref 8.9–12.9)
PMV BLD AUTO: 9.3 FL (ref 8.9–12.9)
PMV BLD AUTO: 9.4 FL (ref 8.9–12.9)
PMV BLD AUTO: 9.6 FL (ref 8.9–12.9)
PMV BLD AUTO: 9.6 FL (ref 8.9–12.9)
PMV BLD AUTO: 9.8 FL (ref 8.9–12.9)
PMV BLD AUTO: 9.9 FL (ref 8.9–12.9)
PMV BLD AUTO: 9.9 FL (ref 8.9–12.9)
POTASSIUM SERPL-SCNC: 2.9 MMOL/L (ref 3.5–5.1)
POTASSIUM SERPL-SCNC: 3.1 MMOL/L (ref 3.5–5.1)
POTASSIUM SERPL-SCNC: 3.4 MMOL/L (ref 3.5–5.1)
POTASSIUM SERPL-SCNC: 3.4 MMOL/L (ref 3.5–5.1)
POTASSIUM SERPL-SCNC: 3.5 MMOL/L (ref 3.5–5.1)
POTASSIUM SERPL-SCNC: 3.6 MMOL/L (ref 3.5–5.1)
POTASSIUM SERPL-SCNC: 3.6 MMOL/L (ref 3.5–5.1)
POTASSIUM SERPL-SCNC: 3.7 MMOL/L (ref 3.5–5.1)
POTASSIUM SERPL-SCNC: 3.7 MMOL/L (ref 3.5–5.1)
POTASSIUM SERPL-SCNC: 3.9 MMOL/L (ref 3.5–5.1)
POTASSIUM SERPL-SCNC: 4 MMOL/L (ref 3.5–5.1)
POTASSIUM SERPL-SCNC: 4.1 MMOL/L (ref 3.5–5.1)
POTASSIUM SERPL-SCNC: 4.2 MMOL/L (ref 3.5–5.1)
POTASSIUM SERPL-SCNC: 4.3 MMOL/L (ref 3.5–5.1)
POTASSIUM SERPL-SCNC: 4.4 MMOL/L (ref 3.5–5.1)
POTASSIUM SERPL-SCNC: 4.8 MMOL/L (ref 3.5–5.1)
PROCALCITONIN SERPL-MCNC: 0.2 NG/ML
PROT SERPL-MCNC: 4.5 G/DL (ref 6.4–8.2)
PROT SERPL-MCNC: 4.5 G/DL (ref 6.4–8.2)
PROT SERPL-MCNC: 4.7 G/DL (ref 6.4–8.2)
PROT SERPL-MCNC: 4.8 G/DL (ref 6.4–8.2)
PROT SERPL-MCNC: 4.9 G/DL (ref 6.4–8.2)
PROT SERPL-MCNC: 5 G/DL (ref 6.4–8.2)
PROT SERPL-MCNC: 5 G/DL (ref 6.4–8.2)
PROT SERPL-MCNC: 5.1 G/DL (ref 6.4–8.2)
PROT SERPL-MCNC: 5.1 G/DL (ref 6.4–8.2)
PROT SERPL-MCNC: 5.2 G/DL (ref 6.4–8.2)
PROT SERPL-MCNC: 5.2 G/DL (ref 6.4–8.2)
PROT SERPL-MCNC: 5.3 G/DL (ref 6.4–8.2)
PROT SERPL-MCNC: 5.4 G/DL (ref 6.4–8.2)
PROT SERPL-MCNC: 5.5 G/DL (ref 6.4–8.2)
PROT SERPL-MCNC: 5.5 G/DL (ref 6.4–8.2)
PROT SERPL-MCNC: 5.6 G/DL (ref 6.4–8.2)
PROT SERPL-MCNC: 5.6 G/DL (ref 6.4–8.2)
PROT SERPL-MCNC: 5.8 G/DL (ref 6.4–8.2)
PROT SERPL-MCNC: 5.8 G/DL (ref 6.4–8.2)
PROT SERPL-MCNC: 5.9 G/DL (ref 6.4–8.2)
PROT SERPL-MCNC: 6.2 G/DL (ref 6.4–8.2)
PROT UR STRIP-MCNC: 30 MG/DL
PROT UR STRIP-MCNC: NEGATIVE MG/DL
PROTHROMBIN TIME: 10.9 SEC (ref 9–11.1)
Q-T INTERVAL, ECG07: 294 MS
Q-T INTERVAL, ECG07: 300 MS
Q-T INTERVAL, ECG07: 330 MS
Q-T INTERVAL, ECG07: 340 MS
Q-T INTERVAL, ECG07: 404 MS
QRS DURATION, ECG06: 78 MS
QRS DURATION, ECG06: 84 MS
QRS DURATION, ECG06: 86 MS
QTC CALCULATION (BEZET), ECG08: 436 MS
QTC CALCULATION (BEZET), ECG08: 455 MS
QTC CALCULATION (BEZET), ECG08: 458 MS
QTC CALCULATION (BEZET), ECG08: 479 MS
QTC CALCULATION (BEZET), ECG08: 533 MS
RBC # BLD AUTO: 2.48 M/UL (ref 3.8–5.2)
RBC # BLD AUTO: 2.67 M/UL (ref 3.8–5.2)
RBC # BLD AUTO: 2.83 M/UL (ref 3.8–5.2)
RBC # BLD AUTO: 2.93 M/UL (ref 3.8–5.2)
RBC # BLD AUTO: 3.01 M/UL (ref 3.8–5.2)
RBC # BLD AUTO: 3.13 M/UL (ref 3.8–5.2)
RBC # BLD AUTO: 3.15 M/UL (ref 3.8–5.2)
RBC # BLD AUTO: 3.19 M/UL (ref 3.8–5.2)
RBC # BLD AUTO: 3.25 M/UL (ref 3.8–5.2)
RBC # BLD AUTO: 3.29 M/UL (ref 3.8–5.2)
RBC # BLD AUTO: 3.32 M/UL (ref 3.8–5.2)
RBC # BLD AUTO: 3.36 M/UL (ref 3.8–5.2)
RBC # BLD AUTO: 3.41 M/UL (ref 3.8–5.2)
RBC # BLD AUTO: 3.43 M/UL (ref 3.8–5.2)
RBC # BLD AUTO: 3.45 M/UL (ref 3.8–5.2)
RBC # BLD AUTO: 3.5 M/UL (ref 3.8–5.2)
RBC # BLD AUTO: 3.51 M/UL (ref 3.8–5.2)
RBC # BLD AUTO: 3.54 M/UL (ref 3.8–5.2)
RBC # BLD AUTO: 3.63 M/UL (ref 3.8–5.2)
RBC # BLD AUTO: 3.65 M/UL (ref 3.8–5.2)
RBC # BLD AUTO: 3.66 M/UL (ref 3.8–5.2)
RBC # BLD AUTO: 3.66 M/UL (ref 3.8–5.2)
RBC # BLD AUTO: 3.67 M/UL (ref 3.8–5.2)
RBC # BLD AUTO: 3.77 M/UL (ref 3.8–5.2)
RBC # BLD AUTO: 3.87 M/UL (ref 3.8–5.2)
RBC # BLD AUTO: 3.95 M/UL (ref 3.8–5.2)
RBC # BLD AUTO: 3.97 M/UL (ref 3.8–5.2)
RBC # BLD AUTO: 3.98 M/UL (ref 3.8–5.2)
RBC # BLD AUTO: 3.98 M/UL (ref 3.8–5.2)
RBC # BLD AUTO: 4.03 M/UL (ref 3.8–5.2)
RBC # BLD AUTO: 4.64 M/UL (ref 3.8–5.2)
RBC #/AREA URNS HPF: ABNORMAL /HPF (ref 0–5)
RBC #/AREA URNS HPF: ABNORMAL /HPF (ref 0–5)
RBC MORPH BLD: ABNORMAL
SALMONELLA SPECIES, DNA: NEGATIVE
SAMPLES BEING HELD,HOLD: NORMAL
SARS-COV-2, COV2: NOT DETECTED
SERVICE CMNT-IMP: ABNORMAL
SERVICE CMNT-IMP: NORMAL
SHIGA TOXIN PRODUCING, DNA: NEGATIVE
SHIGELLA SP+EIEC IPAH STL QL NAA+PROBE: NEGATIVE
SODIUM SERPL-SCNC: 128 MMOL/L (ref 136–145)
SODIUM SERPL-SCNC: 130 MMOL/L (ref 136–145)
SODIUM SERPL-SCNC: 130 MMOL/L (ref 136–145)
SODIUM SERPL-SCNC: 134 MMOL/L (ref 136–145)
SODIUM SERPL-SCNC: 135 MMOL/L (ref 136–145)
SODIUM SERPL-SCNC: 135 MMOL/L (ref 136–145)
SODIUM SERPL-SCNC: 137 MMOL/L (ref 136–145)
SODIUM SERPL-SCNC: 137 MMOL/L (ref 136–145)
SODIUM SERPL-SCNC: 138 MMOL/L (ref 136–145)
SODIUM SERPL-SCNC: 139 MMOL/L (ref 136–145)
SODIUM SERPL-SCNC: 140 MMOL/L (ref 136–145)
SODIUM SERPL-SCNC: 141 MMOL/L (ref 136–145)
SODIUM SERPL-SCNC: 142 MMOL/L (ref 136–145)
SODIUM SERPL-SCNC: 143 MMOL/L (ref 136–145)
SODIUM SERPL-SCNC: 144 MMOL/L (ref 136–145)
SODIUM SERPL-SCNC: 145 MMOL/L (ref 136–145)
SODIUM SERPL-SCNC: 145 MMOL/L (ref 136–145)
SOURCE, COVRS: NORMAL
SP GR UR REFRACTOMETRY: 1.01 (ref 1–1.03)
SP GR UR REFRACTOMETRY: 1.02 (ref 1–1.03)
SP1: NORMAL
SP2: NORMAL
SP3: NORMAL
SPECIMEN EXP DATE BLD: NORMAL
SPECIMEN SOURCE, FCOV2M: NORMAL
SPECIMEN TYPE, XMCV1T: NORMAL
STATUS OF UNIT,%ST: NORMAL
TIBC SERPL-MCNC: 305 UG/DL (ref 250–450)
TROPONIN I SERPL-MCNC: <0.05 NG/ML
TROPONIN I SERPL-MCNC: <0.05 NG/ML
UNIT DIVISION, %UDIV: 0
UR CULT HOLD, URHOLD: NORMAL
UR CULT HOLD, URHOLD: NORMAL
URATE SERPL-MCNC: 6.2 MG/DL (ref 2.6–6)
URATE SERPL-MCNC: 8.6 MG/DL (ref 2.6–6)
URATE SERPL-MCNC: 9.2 MG/DL (ref 2.6–6)
UROBILINOGEN UR QL STRIP.AUTO: 0.2 EU/DL (ref 0.2–1)
UROBILINOGEN UR QL STRIP.AUTO: 1 EU/DL (ref 0.2–1)
VANCOMYCIN SERPL-MCNC: 18.5 UG/ML
VENTRICULAR RATE, ECG03: 105 BPM
VENTRICULAR RATE, ECG03: 116 BPM
VENTRICULAR RATE, ECG03: 144 BPM
VENTRICULAR RATE, ECG03: 153 BPM
VENTRICULAR RATE, ECG03: 99 BPM
VIBRIO SPECIES, DNA: NEGATIVE
VIT B12 SERPL-MCNC: 159 PG/ML (ref 193–986)
WBC # BLD AUTO: 10 K/UL (ref 3.6–11)
WBC # BLD AUTO: 10.2 K/UL (ref 3.6–11)
WBC # BLD AUTO: 10.5 K/UL (ref 3.6–11)
WBC # BLD AUTO: 10.6 K/UL (ref 3.6–11)
WBC # BLD AUTO: 11 K/UL (ref 3.6–11)
WBC # BLD AUTO: 11 K/UL (ref 3.6–11)
WBC # BLD AUTO: 11.1 K/UL (ref 3.6–11)
WBC # BLD AUTO: 11.2 K/UL (ref 3.6–11)
WBC # BLD AUTO: 12.4 K/UL (ref 3.6–11)
WBC # BLD AUTO: 13.1 K/UL (ref 3.6–11)
WBC # BLD AUTO: 13.3 K/UL (ref 3.6–11)
WBC # BLD AUTO: 13.6 K/UL (ref 3.6–11)
WBC # BLD AUTO: 14 K/UL (ref 3.6–11)
WBC # BLD AUTO: 15.2 K/UL (ref 3.6–11)
WBC # BLD AUTO: 15.8 K/UL (ref 3.6–11)
WBC # BLD AUTO: 16.3 K/UL (ref 3.6–11)
WBC # BLD AUTO: 17.6 K/UL (ref 3.6–11)
WBC # BLD AUTO: 17.7 K/UL (ref 3.6–11)
WBC # BLD AUTO: 18.8 K/UL (ref 3.6–11)
WBC # BLD AUTO: 20.3 K/UL (ref 3.6–11)
WBC # BLD AUTO: 6.1 K/UL (ref 3.6–11)
WBC # BLD AUTO: 6.8 K/UL (ref 3.6–11)
WBC # BLD AUTO: 7.1 K/UL (ref 3.6–11)
WBC # BLD AUTO: 7.2 K/UL (ref 3.6–11)
WBC # BLD AUTO: 7.4 K/UL (ref 3.6–11)
WBC # BLD AUTO: 8.3 K/UL (ref 3.6–11)
WBC # BLD AUTO: 8.5 K/UL (ref 3.6–11)
WBC # BLD AUTO: 8.7 K/UL (ref 3.6–11)
WBC # BLD AUTO: 9.4 K/UL (ref 3.6–11)
WBC # BLD AUTO: 9.4 K/UL (ref 3.6–11)
WBC # BLD AUTO: 9.8 K/UL (ref 3.6–11)
WBC MORPH BLD: ABNORMAL
WBC URNS QL MICRO: ABNORMAL /HPF (ref 0–4)
WBC URNS QL MICRO: ABNORMAL /HPF (ref 0–4)
Y. ENTEROCOLITICA, DNA: NEGATIVE

## 2020-01-01 PROCEDURE — 80053 COMPREHEN METABOLIC PANEL: CPT

## 2020-01-01 PROCEDURE — 72192 CT PELVIS W/O DYE: CPT

## 2020-01-01 PROCEDURE — 74011250636 HC RX REV CODE- 250/636: Performed by: INTERNAL MEDICINE

## 2020-01-01 PROCEDURE — 94640 AIRWAY INHALATION TREATMENT: CPT

## 2020-01-01 PROCEDURE — 99284 EMERGENCY DEPT VISIT MOD MDM: CPT

## 2020-01-01 PROCEDURE — 74011636637 HC RX REV CODE- 636/637: Performed by: INTERNAL MEDICINE

## 2020-01-01 PROCEDURE — 51798 US URINE CAPACITY MEASURE: CPT

## 2020-01-01 PROCEDURE — 36430 TRANSFUSION BLD/BLD COMPNT: CPT

## 2020-01-01 PROCEDURE — 65660000000 HC RM CCU STEPDOWN

## 2020-01-01 PROCEDURE — 74011250636 HC RX REV CODE- 250/636: Performed by: FAMILY MEDICINE

## 2020-01-01 PROCEDURE — 82962 GLUCOSE BLOOD TEST: CPT

## 2020-01-01 PROCEDURE — 36415 COLL VENOUS BLD VENIPUNCTURE: CPT

## 2020-01-01 PROCEDURE — 94660 CPAP INITIATION&MGMT: CPT

## 2020-01-01 PROCEDURE — 93005 ELECTROCARDIOGRAM TRACING: CPT

## 2020-01-01 PROCEDURE — 87186 SC STD MICRODIL/AGAR DIL: CPT

## 2020-01-01 PROCEDURE — 74011000250 HC RX REV CODE- 250: Performed by: INTERNAL MEDICINE

## 2020-01-01 PROCEDURE — 97530 THERAPEUTIC ACTIVITIES: CPT

## 2020-01-01 PROCEDURE — 87205 SMEAR GRAM STAIN: CPT

## 2020-01-01 PROCEDURE — 77030038269 HC DRN EXT URIN PURWCK BARD -A

## 2020-01-01 PROCEDURE — C9113 INJ PANTOPRAZOLE SODIUM, VIA: HCPCS | Performed by: FAMILY MEDICINE

## 2020-01-01 PROCEDURE — 90686 IIV4 VACC NO PRSV 0.5 ML IM: CPT | Performed by: FAMILY MEDICINE

## 2020-01-01 PROCEDURE — 77030040393 HC DRSG OPTIFOAM GENT MDII -B

## 2020-01-01 PROCEDURE — 74011250637 HC RX REV CODE- 250/637: Performed by: INTERNAL MEDICINE

## 2020-01-01 PROCEDURE — 77010033678 HC OXYGEN DAILY

## 2020-01-01 PROCEDURE — 77030021783 HC SYS CEM DEL MEDT -D

## 2020-01-01 PROCEDURE — 74011250637 HC RX REV CODE- 250/637: Performed by: FAMILY MEDICINE

## 2020-01-01 PROCEDURE — 74011250637 HC RX REV CODE- 250/637: Performed by: NURSE PRACTITIONER

## 2020-01-01 PROCEDURE — 77030041070 HC DRSG ALG MDII -A

## 2020-01-01 PROCEDURE — 76937 US GUIDE VASCULAR ACCESS: CPT

## 2020-01-01 PROCEDURE — 76060000033 HC ANESTHESIA 1 TO 1.5 HR

## 2020-01-01 PROCEDURE — 85652 RBC SED RATE AUTOMATED: CPT

## 2020-01-01 PROCEDURE — 87086 URINE CULTURE/COLONY COUNT: CPT

## 2020-01-01 PROCEDURE — 94760 N-INVAS EAR/PLS OXIMETRY 1: CPT

## 2020-01-01 PROCEDURE — C1713 ANCHOR/SCREW BN/BN,TIS/BN: HCPCS

## 2020-01-01 PROCEDURE — 74011000258 HC RX REV CODE- 258: Performed by: INTERNAL MEDICINE

## 2020-01-01 PROCEDURE — 97165 OT EVAL LOW COMPLEX 30 MIN: CPT

## 2020-01-01 PROCEDURE — 2709999900 HC NON-CHARGEABLE SUPPLY

## 2020-01-01 PROCEDURE — 77030018786 HC NDL GD F/USND BARD -B

## 2020-01-01 PROCEDURE — 71045 X-RAY EXAM CHEST 1 VIEW: CPT

## 2020-01-01 PROCEDURE — 74011000250 HC RX REV CODE- 250: Performed by: FAMILY MEDICINE

## 2020-01-01 PROCEDURE — 85025 COMPLETE CBC W/AUTO DIFF WBC: CPT

## 2020-01-01 PROCEDURE — 77030003666 HC NDL SPINAL BD -A

## 2020-01-01 PROCEDURE — 77030011256 HC DRSG MEPILEX <16IN NO BORD MOLN -A

## 2020-01-01 PROCEDURE — 74011000258 HC RX REV CODE- 258: Performed by: NURSE PRACTITIONER

## 2020-01-01 PROCEDURE — 97535 SELF CARE MNGMENT TRAINING: CPT

## 2020-01-01 PROCEDURE — 83735 ASSAY OF MAGNESIUM: CPT

## 2020-01-01 PROCEDURE — 94664 DEMO&/EVAL PT USE INHALER: CPT

## 2020-01-01 PROCEDURE — 72125 CT NECK SPINE W/O DYE: CPT

## 2020-01-01 PROCEDURE — 74011250637 HC RX REV CODE- 250/637: Performed by: STUDENT IN AN ORGANIZED HEALTH CARE EDUCATION/TRAINING PROGRAM

## 2020-01-01 PROCEDURE — 87635 SARS-COV-2 COVID-19 AMP PRB: CPT

## 2020-01-01 PROCEDURE — 74011000636 HC RX REV CODE- 636: Performed by: RADIOLOGY

## 2020-01-01 PROCEDURE — 97162 PT EVAL MOD COMPLEX 30 MIN: CPT

## 2020-01-01 PROCEDURE — 74011636320 HC RX REV CODE- 636/320: Performed by: RADIOLOGY

## 2020-01-01 PROCEDURE — 74011636637 HC RX REV CODE- 636/637: Performed by: FAMILY MEDICINE

## 2020-01-01 PROCEDURE — 74011250636 HC RX REV CODE- 250/636: Performed by: NURSE PRACTITIONER

## 2020-01-01 PROCEDURE — 97116 GAIT TRAINING THERAPY: CPT

## 2020-01-01 PROCEDURE — 86900 BLOOD TYPING SEROLOGIC ABO: CPT

## 2020-01-01 PROCEDURE — 0QS03ZZ REPOSITION LUMBAR VERTEBRA, PERCUTANEOUS APPROACH: ICD-10-PCS | Performed by: STUDENT IN AN ORGANIZED HEALTH CARE EDUCATION/TRAINING PROGRAM

## 2020-01-01 PROCEDURE — 84484 ASSAY OF TROPONIN QUANT: CPT

## 2020-01-01 PROCEDURE — 65610000006 HC RM INTENSIVE CARE

## 2020-01-01 PROCEDURE — 74011250636 HC RX REV CODE- 250/636: Performed by: EMERGENCY MEDICINE

## 2020-01-01 PROCEDURE — 74011000258 HC RX REV CODE- 258: Performed by: EMERGENCY MEDICINE

## 2020-01-01 PROCEDURE — 84145 PROCALCITONIN (PCT): CPT

## 2020-01-01 PROCEDURE — 83880 ASSAY OF NATRIURETIC PEPTIDE: CPT

## 2020-01-01 PROCEDURE — 96374 THER/PROPH/DIAG INJ IV PUSH: CPT

## 2020-01-01 PROCEDURE — 97535 SELF CARE MNGMENT TRAINING: CPT | Performed by: OCCUPATIONAL THERAPIST

## 2020-01-01 PROCEDURE — 77030021782 HC SYS CEM CART DEL KYPH -C

## 2020-01-01 PROCEDURE — 83540 ASSAY OF IRON: CPT

## 2020-01-01 PROCEDURE — 87040 BLOOD CULTURE FOR BACTERIA: CPT

## 2020-01-01 PROCEDURE — 74011000250 HC RX REV CODE- 250: Performed by: STUDENT IN AN ORGANIZED HEALTH CARE EDUCATION/TRAINING PROGRAM

## 2020-01-01 PROCEDURE — 93970 EXTREMITY STUDY: CPT

## 2020-01-01 PROCEDURE — 84550 ASSAY OF BLOOD/URIC ACID: CPT

## 2020-01-01 PROCEDURE — 74011250636 HC RX REV CODE- 250/636: Performed by: STUDENT IN AN ORGANIZED HEALTH CARE EDUCATION/TRAINING PROGRAM

## 2020-01-01 PROCEDURE — 99231 SBSQ HOSP IP/OBS SF/LOW 25: CPT | Performed by: SPECIALIST

## 2020-01-01 PROCEDURE — 0QU03JZ SUPPLEMENT LUMBAR VERTEBRA WITH SYNTHETIC SUBSTITUTE, PERCUTANEOUS APPROACH: ICD-10-PCS | Performed by: STUDENT IN AN ORGANIZED HEALTH CARE EDUCATION/TRAINING PROGRAM

## 2020-01-01 PROCEDURE — 97165 OT EVAL LOW COMPLEX 30 MIN: CPT | Performed by: OCCUPATIONAL THERAPIST

## 2020-01-01 PROCEDURE — 77030027138 HC INCENT SPIROMETER -A

## 2020-01-01 PROCEDURE — 82728 ASSAY OF FERRITIN: CPT

## 2020-01-01 PROCEDURE — 22514 PERQ VERTEBRAL AUGMENTATION: CPT

## 2020-01-01 PROCEDURE — 97110 THERAPEUTIC EXERCISES: CPT

## 2020-01-01 PROCEDURE — 85027 COMPLETE CBC AUTOMATED: CPT

## 2020-01-01 PROCEDURE — P9016 RBC LEUKOCYTES REDUCED: HCPCS

## 2020-01-01 PROCEDURE — 80162 ASSAY OF DIGOXIN TOTAL: CPT

## 2020-01-01 PROCEDURE — 51701 INSERT BLADDER CATHETER: CPT

## 2020-01-01 PROCEDURE — 99232 SBSQ HOSP IP/OBS MODERATE 35: CPT | Performed by: INTERNAL MEDICINE

## 2020-01-01 PROCEDURE — 81001 URINALYSIS AUTO W/SCOPE: CPT

## 2020-01-01 PROCEDURE — 82607 VITAMIN B-12: CPT

## 2020-01-01 PROCEDURE — 73610 X-RAY EXAM OF ANKLE: CPT

## 2020-01-01 PROCEDURE — 87077 CULTURE AEROBIC IDENTIFY: CPT

## 2020-01-01 PROCEDURE — 86140 C-REACTIVE PROTEIN: CPT

## 2020-01-01 PROCEDURE — 86923 COMPATIBILITY TEST ELECTRIC: CPT

## 2020-01-01 PROCEDURE — 74011250637 HC RX REV CODE- 250/637: Performed by: SPECIALIST

## 2020-01-01 PROCEDURE — 99285 EMERGENCY DEPT VISIT HI MDM: CPT

## 2020-01-01 PROCEDURE — 80048 BASIC METABOLIC PNL TOTAL CA: CPT

## 2020-01-01 PROCEDURE — 74176 CT ABD & PELVIS W/O CONTRAST: CPT

## 2020-01-01 PROCEDURE — 72131 CT LUMBAR SPINE W/O DYE: CPT

## 2020-01-01 PROCEDURE — 83605 ASSAY OF LACTIC ACID: CPT

## 2020-01-01 PROCEDURE — 82272 OCCULT BLD FECES 1-3 TESTS: CPT

## 2020-01-01 PROCEDURE — 83036 HEMOGLOBIN GLYCOSYLATED A1C: CPT

## 2020-01-01 PROCEDURE — 90471 IMMUNIZATION ADMIN: CPT

## 2020-01-01 PROCEDURE — 77030013038 HC MSK CPAP FISP -A

## 2020-01-01 PROCEDURE — 5A09457 ASSISTANCE WITH RESPIRATORY VENTILATION, 24-96 CONSECUTIVE HOURS, CONTINUOUS POSITIVE AIRWAY PRESSURE: ICD-10-PCS | Performed by: FAMILY MEDICINE

## 2020-01-01 PROCEDURE — 87506 IADNA-DNA/RNA PROBE TQ 6-11: CPT

## 2020-01-01 PROCEDURE — 80076 HEPATIC FUNCTION PANEL: CPT

## 2020-01-01 PROCEDURE — 93306 TTE W/DOPPLER COMPLETE: CPT

## 2020-01-01 PROCEDURE — 85018 HEMOGLOBIN: CPT

## 2020-01-01 PROCEDURE — 77030029684 HC NEB SM VOL KT MONA -A

## 2020-01-01 PROCEDURE — 30233N1 TRANSFUSION OF NONAUTOLOGOUS RED BLOOD CELLS INTO PERIPHERAL VEIN, PERCUTANEOUS APPROACH: ICD-10-PCS | Performed by: FAMILY MEDICINE

## 2020-01-01 PROCEDURE — 96367 TX/PROPH/DG ADDL SEQ IV INF: CPT

## 2020-01-01 PROCEDURE — C1894 INTRO/SHEATH, NON-LASER: HCPCS

## 2020-01-01 PROCEDURE — 77030034842 HC TAMP SPN BN INFL EXP II KYPH -I

## 2020-01-01 PROCEDURE — 76210000006 HC OR PH I REC 0.5 TO 1 HR

## 2020-01-01 PROCEDURE — 77030033032 HC DRSG MAXORB AG MDII -A

## 2020-01-01 PROCEDURE — 77030041974 HC CATH SYS PERIPH TELE -B

## 2020-01-01 PROCEDURE — 99223 1ST HOSP IP/OBS HIGH 75: CPT | Performed by: INTERNAL MEDICINE

## 2020-01-01 PROCEDURE — 73701 CT LOWER EXTREMITY W/DYE: CPT

## 2020-01-01 PROCEDURE — 74011000636 HC RX REV CODE- 636: Performed by: STUDENT IN AN ORGANIZED HEALTH CARE EDUCATION/TRAINING PROGRAM

## 2020-01-01 PROCEDURE — 65270000029 HC RM PRIVATE

## 2020-01-01 PROCEDURE — 80074 ACUTE HEPATITIS PANEL: CPT

## 2020-01-01 PROCEDURE — 77030018836 HC SOL IRR NACL ICUM -A

## 2020-01-01 PROCEDURE — 97161 PT EVAL LOW COMPLEX 20 MIN: CPT

## 2020-01-01 PROCEDURE — 76705 ECHO EXAM OF ABDOMEN: CPT

## 2020-01-01 PROCEDURE — C1751 CATH, INF, PER/CENT/MIDLINE: HCPCS

## 2020-01-01 PROCEDURE — 77030035128 HC DRSG ANTIMIC FOAM HYDROFERA HOLL -A

## 2020-01-01 PROCEDURE — 96365 THER/PROPH/DIAG IV INF INIT: CPT

## 2020-01-01 PROCEDURE — 80202 ASSAY OF VANCOMYCIN: CPT

## 2020-01-01 PROCEDURE — 77030021668 HC NEB PREFIL KT VYRM -A

## 2020-01-01 PROCEDURE — 85610 PROTHROMBIN TIME: CPT

## 2020-01-01 PROCEDURE — 96366 THER/PROPH/DIAG IV INF ADDON: CPT

## 2020-01-01 PROCEDURE — 97530 THERAPEUTIC ACTIVITIES: CPT | Performed by: OCCUPATIONAL THERAPIST

## 2020-01-01 PROCEDURE — 70450 CT HEAD/BRAIN W/O DYE: CPT

## 2020-01-01 PROCEDURE — 72148 MRI LUMBAR SPINE W/O DYE: CPT

## 2020-01-01 PROCEDURE — P9040 RBC LEUKOREDUCED IRRADIATED: HCPCS

## 2020-01-01 RX ORDER — ACETAMINOPHEN 650 MG/1
650 SUPPOSITORY RECTAL
Status: DISCONTINUED | OUTPATIENT
Start: 2020-01-01 | End: 2020-01-01 | Stop reason: HOSPADM

## 2020-01-01 RX ORDER — ALBUTEROL SULFATE 1.25 MG/3ML
1.25 SOLUTION RESPIRATORY (INHALATION)
Status: DISCONTINUED | OUTPATIENT
Start: 2020-01-01 | End: 2020-01-01 | Stop reason: HOSPADM

## 2020-01-01 RX ORDER — MONTELUKAST SODIUM 10 MG/1
10 TABLET, FILM COATED ORAL EVERY EVENING
COMMUNITY
Start: 2021-01-01

## 2020-01-01 RX ORDER — FENTANYL CITRATE 50 UG/ML
25 INJECTION, SOLUTION INTRAMUSCULAR; INTRAVENOUS AS NEEDED
Status: DISCONTINUED | OUTPATIENT
Start: 2020-01-01 | End: 2020-01-01

## 2020-01-01 RX ORDER — MIDAZOLAM HYDROCHLORIDE 1 MG/ML
INJECTION, SOLUTION INTRAMUSCULAR; INTRAVENOUS AS NEEDED
Status: DISCONTINUED | OUTPATIENT
Start: 2020-01-01 | End: 2020-01-01 | Stop reason: HOSPADM

## 2020-01-01 RX ORDER — SODIUM CHLORIDE 9 MG/ML
250 INJECTION, SOLUTION INTRAVENOUS AS NEEDED
Status: DISPENSED | OUTPATIENT
Start: 2020-01-01 | End: 2020-01-01

## 2020-01-01 RX ORDER — PROMETHAZINE HYDROCHLORIDE 25 MG/1
12.5 TABLET ORAL
Status: DISCONTINUED | OUTPATIENT
Start: 2020-01-01 | End: 2020-01-01 | Stop reason: HOSPADM

## 2020-01-01 RX ORDER — LOPERAMIDE HYDROCHLORIDE 2 MG/1
4 CAPSULE ORAL 2 TIMES DAILY
Status: DISCONTINUED | OUTPATIENT
Start: 2020-01-01 | End: 2020-01-01 | Stop reason: HOSPADM

## 2020-01-01 RX ORDER — ONDANSETRON 2 MG/ML
4 INJECTION INTRAMUSCULAR; INTRAVENOUS
Status: DISCONTINUED | OUTPATIENT
Start: 2020-01-01 | End: 2020-01-01

## 2020-01-01 RX ORDER — CLINDAMYCIN PHOSPHATE 600 MG/50ML
600 INJECTION, SOLUTION INTRAVENOUS EVERY 8 HOURS
Status: DISCONTINUED | OUTPATIENT
Start: 2020-01-01 | End: 2020-01-01

## 2020-01-01 RX ORDER — METOPROLOL TARTRATE 25 MG/1
12.5 TABLET, FILM COATED ORAL EVERY 6 HOURS
Status: DISCONTINUED | OUTPATIENT
Start: 2020-01-01 | End: 2020-01-01

## 2020-01-01 RX ORDER — ACETAMINOPHEN 325 MG/1
650 TABLET ORAL
Status: DISCONTINUED | OUTPATIENT
Start: 2020-01-01 | End: 2020-01-01 | Stop reason: HOSPADM

## 2020-01-01 RX ORDER — BUDESONIDE AND FORMOTEROL FUMARATE DIHYDRATE 160; 4.5 UG/1; UG/1
2 AEROSOL RESPIRATORY (INHALATION)
Status: DISCONTINUED | OUTPATIENT
Start: 2020-01-01 | End: 2020-01-01

## 2020-01-01 RX ORDER — NALOXONE HYDROCHLORIDE 0.4 MG/ML
0.4 INJECTION, SOLUTION INTRAMUSCULAR; INTRAVENOUS; SUBCUTANEOUS AS NEEDED
Status: DISCONTINUED | OUTPATIENT
Start: 2020-01-01 | End: 2020-01-01 | Stop reason: HOSPADM

## 2020-01-01 RX ORDER — ONDANSETRON 2 MG/ML
4 INJECTION INTRAMUSCULAR; INTRAVENOUS
Status: DISCONTINUED | OUTPATIENT
Start: 2020-01-01 | End: 2020-01-01 | Stop reason: HOSPADM

## 2020-01-01 RX ORDER — PHENYLEPHRINE HCL IN 0.9% NACL 0.4MG/10ML
SYRINGE (ML) INTRAVENOUS AS NEEDED
Status: DISCONTINUED | OUTPATIENT
Start: 2020-01-01 | End: 2020-01-01 | Stop reason: HOSPADM

## 2020-01-01 RX ORDER — ZINC OXIDE 20 G/100G
OINTMENT TOPICAL 3 TIMES DAILY
Status: ON HOLD | COMMUNITY
End: 2021-01-01 | Stop reason: CLARIF

## 2020-01-01 RX ORDER — MIDAZOLAM HYDROCHLORIDE 1 MG/ML
INJECTION, SOLUTION INTRAMUSCULAR; INTRAVENOUS
Status: COMPLETED
Start: 2020-01-01 | End: 2020-01-01

## 2020-01-01 RX ORDER — HYDROCODONE BITARTRATE AND ACETAMINOPHEN 5; 325 MG/1; MG/1
1 TABLET ORAL
Status: DISCONTINUED | OUTPATIENT
Start: 2020-01-01 | End: 2020-01-01 | Stop reason: HOSPADM

## 2020-01-01 RX ORDER — TRAMADOL HYDROCHLORIDE 50 MG/1
50 TABLET ORAL
Status: DISCONTINUED | OUTPATIENT
Start: 2020-01-01 | End: 2020-01-01

## 2020-01-01 RX ORDER — FEBUXOSTAT 40 MG/1
40 TABLET, FILM COATED ORAL DAILY
Status: DISCONTINUED | OUTPATIENT
Start: 2020-01-01 | End: 2020-01-01 | Stop reason: HOSPADM

## 2020-01-01 RX ORDER — ARFORMOTEROL TARTRATE 15 UG/2ML
15 SOLUTION RESPIRATORY (INHALATION)
Status: DISCONTINUED | OUTPATIENT
Start: 2020-01-01 | End: 2020-01-01 | Stop reason: HOSPADM

## 2020-01-01 RX ORDER — SODIUM CHLORIDE 9 MG/ML
75 INJECTION, SOLUTION INTRAVENOUS CONTINUOUS
Status: CANCELLED | OUTPATIENT
Start: 2020-01-01

## 2020-01-01 RX ORDER — POTASSIUM CHLORIDE 750 MG/1
10 TABLET, FILM COATED, EXTENDED RELEASE ORAL 2 TIMES DAILY
Status: DISCONTINUED | OUTPATIENT
Start: 2020-01-01 | End: 2020-01-01

## 2020-01-01 RX ORDER — COLCHICINE 0.6 MG/1
0.6 CAPSULE ORAL 2 TIMES DAILY
Status: DISCONTINUED | OUTPATIENT
Start: 2020-01-01 | End: 2020-01-01 | Stop reason: CLARIF

## 2020-01-01 RX ORDER — VANCOMYCIN/0.9 % SOD CHLORIDE 1.5G/250ML
1500 PLASTIC BAG, INJECTION (ML) INTRAVENOUS EVERY 24 HOURS
Status: DISCONTINUED | OUTPATIENT
Start: 2020-01-01 | End: 2020-01-01

## 2020-01-01 RX ORDER — LANOLIN ALCOHOL/MO/W.PET/CERES
1000 CREAM (GRAM) TOPICAL DAILY
Status: DISCONTINUED | OUTPATIENT
Start: 2020-01-01 | End: 2020-01-01 | Stop reason: HOSPADM

## 2020-01-01 RX ORDER — POLYETHYLENE GLYCOL 3350 17 G/17G
17 POWDER, FOR SOLUTION ORAL DAILY PRN
Status: DISCONTINUED | OUTPATIENT
Start: 2020-01-01 | End: 2020-01-01 | Stop reason: HOSPADM

## 2020-01-01 RX ORDER — ALENDRONATE SODIUM 70 MG/1
70 TABLET ORAL
COMMUNITY
End: 2021-01-01

## 2020-01-01 RX ORDER — METOPROLOL TARTRATE 25 MG/1
25 TABLET, FILM COATED ORAL
COMMUNITY
End: 2021-01-01

## 2020-01-01 RX ORDER — LANOLIN ALCOHOL/MO/W.PET/CERES
400 CREAM (GRAM) TOPICAL
Status: DISCONTINUED | OUTPATIENT
Start: 2020-01-01 | End: 2020-01-01 | Stop reason: HOSPADM

## 2020-01-01 RX ORDER — MAGNESIUM SULFATE 100 %
4 CRYSTALS MISCELLANEOUS AS NEEDED
Status: DISCONTINUED | OUTPATIENT
Start: 2020-01-01 | End: 2020-01-01 | Stop reason: HOSPADM

## 2020-01-01 RX ORDER — POTASSIUM CHLORIDE 750 MG/1
TABLET, FILM COATED, EXTENDED RELEASE ORAL
Status: DISCONTINUED
Start: 2020-01-01 | End: 2020-01-01

## 2020-01-01 RX ORDER — SODIUM CHLORIDE, SODIUM LACTATE, POTASSIUM CHLORIDE, CALCIUM CHLORIDE 600; 310; 30; 20 MG/100ML; MG/100ML; MG/100ML; MG/100ML
INJECTION, SOLUTION INTRAVENOUS
Status: DISCONTINUED | OUTPATIENT
Start: 2020-01-01 | End: 2020-01-01 | Stop reason: HOSPADM

## 2020-01-01 RX ORDER — SODIUM CHLORIDE 0.9 % (FLUSH) 0.9 %
5-40 SYRINGE (ML) INJECTION AS NEEDED
Status: DISCONTINUED | OUTPATIENT
Start: 2020-01-01 | End: 2020-01-01 | Stop reason: HOSPADM

## 2020-01-01 RX ORDER — ACETAMINOPHEN 325 MG/1
325 TABLET ORAL
Status: DISCONTINUED | OUTPATIENT
Start: 2020-01-01 | End: 2020-01-01 | Stop reason: HOSPADM

## 2020-01-01 RX ORDER — DEXTROSE 50 % IN WATER (D50W) INTRAVENOUS SYRINGE
25-50 AS NEEDED
Status: DISCONTINUED | OUTPATIENT
Start: 2020-01-01 | End: 2020-01-01 | Stop reason: HOSPADM

## 2020-01-01 RX ORDER — TRAMADOL HYDROCHLORIDE 50 MG/1
50 TABLET ORAL
Qty: 6 TAB | Refills: 0 | Status: SHIPPED | OUTPATIENT
Start: 2020-01-01 | End: 2020-01-01

## 2020-01-01 RX ORDER — CEFDINIR 300 MG/1
300 CAPSULE ORAL 2 TIMES DAILY
Qty: 10 CAP | Refills: 0 | Status: SHIPPED
Start: 2020-01-01 | End: 2020-01-01

## 2020-01-01 RX ORDER — DEXTROMETHORPHAN/PSEUDOEPHED 2.5-7.5/.8
1.2 DROPS ORAL
Status: DISCONTINUED | OUTPATIENT
Start: 2020-01-01 | End: 2020-01-01

## 2020-01-01 RX ORDER — DEXTROSE MONOHYDRATE 100 MG/ML
0-250 INJECTION, SOLUTION INTRAVENOUS AS NEEDED
Status: DISCONTINUED | OUTPATIENT
Start: 2020-01-01 | End: 2020-01-01 | Stop reason: HOSPADM

## 2020-01-01 RX ORDER — DULOXETIN HYDROCHLORIDE 30 MG/1
60 CAPSULE, DELAYED RELEASE ORAL DAILY
Status: DISCONTINUED | OUTPATIENT
Start: 2020-01-01 | End: 2020-01-01 | Stop reason: HOSPADM

## 2020-01-01 RX ORDER — LANOLIN ALCOHOL/MO/W.PET/CERES
325 CREAM (GRAM) TOPICAL
Status: DISCONTINUED | OUTPATIENT
Start: 2020-01-01 | End: 2020-01-01 | Stop reason: HOSPADM

## 2020-01-01 RX ORDER — FERROUS SULFATE 137(45) MG
142 TABLET, EXTENDED RELEASE ORAL
COMMUNITY
End: 2020-01-01

## 2020-01-01 RX ORDER — PREDNISONE 5 MG/1
10 TABLET ORAL 2 TIMES DAILY WITH MEALS
Status: DISCONTINUED | OUTPATIENT
Start: 2020-01-01 | End: 2020-01-01 | Stop reason: HOSPADM

## 2020-01-01 RX ORDER — POTASSIUM CHLORIDE 750 MG/1
10 TABLET, FILM COATED, EXTENDED RELEASE ORAL 2 TIMES DAILY
Status: DISPENSED | OUTPATIENT
Start: 2020-01-01 | End: 2020-01-01

## 2020-01-01 RX ORDER — NEOSTIGMINE METHYLSULFATE 1 MG/ML
INJECTION, SOLUTION INTRAVENOUS AS NEEDED
Status: DISCONTINUED | OUTPATIENT
Start: 2020-01-01 | End: 2020-01-01

## 2020-01-01 RX ORDER — POTASSIUM CHLORIDE 750 MG/1
10 TABLET, FILM COATED, EXTENDED RELEASE ORAL 2 TIMES DAILY
Status: DISCONTINUED | OUTPATIENT
Start: 2020-01-01 | End: 2020-01-01 | Stop reason: HOSPADM

## 2020-01-01 RX ORDER — DOXYCYCLINE HYCLATE 100 MG
100 TABLET ORAL 2 TIMES DAILY
COMMUNITY
Start: 2020-01-01 | End: 2020-01-01

## 2020-01-01 RX ORDER — PROCHLORPERAZINE EDISYLATE 5 MG/ML
5 INJECTION INTRAMUSCULAR; INTRAVENOUS
Status: DISCONTINUED | OUTPATIENT
Start: 2020-01-01 | End: 2020-01-01 | Stop reason: HOSPADM

## 2020-01-01 RX ORDER — MELATONIN
2000 DAILY
Status: DISCONTINUED | OUTPATIENT
Start: 2020-01-01 | End: 2020-01-01 | Stop reason: HOSPADM

## 2020-01-01 RX ORDER — TRAMADOL HYDROCHLORIDE 50 MG/1
50 TABLET ORAL
COMMUNITY
End: 2020-01-01

## 2020-01-01 RX ORDER — METOPROLOL TARTRATE 25 MG/1
25 TABLET, FILM COATED ORAL
Status: DISCONTINUED | OUTPATIENT
Start: 2020-01-01 | End: 2020-01-01 | Stop reason: HOSPADM

## 2020-01-01 RX ORDER — SODIUM CHLORIDE 9 MG/ML
75 INJECTION, SOLUTION INTRAVENOUS CONTINUOUS
Status: DISCONTINUED | OUTPATIENT
Start: 2020-01-01 | End: 2020-01-01

## 2020-01-01 RX ORDER — BUPIVACAINE HYDROCHLORIDE 2.5 MG/ML
3 INJECTION, SOLUTION EPIDURAL; INFILTRATION; INTRACAUDAL ONCE
Status: COMPLETED | OUTPATIENT
Start: 2020-01-01 | End: 2020-01-01

## 2020-01-01 RX ORDER — PANTOPRAZOLE SODIUM 40 MG/10ML
40 INJECTION, POWDER, LYOPHILIZED, FOR SOLUTION INTRAVENOUS EVERY 24 HOURS
Status: DISCONTINUED | OUTPATIENT
Start: 2020-01-01 | End: 2020-01-01 | Stop reason: CLARIF

## 2020-01-01 RX ORDER — HYDROCORTISONE SODIUM SUCCINATE 100 MG/2ML
25 INJECTION, POWDER, FOR SOLUTION INTRAMUSCULAR; INTRAVENOUS EVERY 8 HOURS
Status: DISCONTINUED | OUTPATIENT
Start: 2020-01-01 | End: 2020-01-01

## 2020-01-01 RX ORDER — FENTANYL CITRATE 50 UG/ML
INJECTION, SOLUTION INTRAMUSCULAR; INTRAVENOUS
Status: COMPLETED
Start: 2020-01-01 | End: 2020-01-01

## 2020-01-01 RX ORDER — PANTOPRAZOLE SODIUM 40 MG/1
40 TABLET, DELAYED RELEASE ORAL
Status: DISCONTINUED | OUTPATIENT
Start: 2020-01-01 | End: 2020-01-01 | Stop reason: HOSPADM

## 2020-01-01 RX ORDER — SODIUM CHLORIDE 9 MG/ML
250 INJECTION, SOLUTION INTRAVENOUS AS NEEDED
Status: DISCONTINUED | OUTPATIENT
Start: 2020-01-01 | End: 2020-01-01 | Stop reason: HOSPADM

## 2020-01-01 RX ORDER — DIGOXIN 125 MCG
0.12 TABLET ORAL DAILY
Status: DISCONTINUED | OUTPATIENT
Start: 2020-01-01 | End: 2020-01-01 | Stop reason: HOSPADM

## 2020-01-01 RX ORDER — INSULIN LISPRO 100 [IU]/ML
INJECTION, SOLUTION INTRAVENOUS; SUBCUTANEOUS
Status: DISCONTINUED | OUTPATIENT
Start: 2020-01-01 | End: 2020-01-01 | Stop reason: HOSPADM

## 2020-01-01 RX ORDER — MIDAZOLAM HYDROCHLORIDE 1 MG/ML
.25-5 INJECTION, SOLUTION INTRAMUSCULAR; INTRAVENOUS AS NEEDED
Status: DISCONTINUED | OUTPATIENT
Start: 2020-01-01 | End: 2020-01-01

## 2020-01-01 RX ORDER — MIRTAZAPINE 15 MG/1
15 TABLET, FILM COATED ORAL
Status: DISCONTINUED | OUTPATIENT
Start: 2020-01-01 | End: 2020-01-01 | Stop reason: HOSPADM

## 2020-01-01 RX ORDER — IPRATROPIUM BROMIDE AND ALBUTEROL SULFATE 2.5; .5 MG/3ML; MG/3ML
3 SOLUTION RESPIRATORY (INHALATION)
Status: DISCONTINUED | OUTPATIENT
Start: 2020-01-01 | End: 2020-01-01 | Stop reason: HOSPADM

## 2020-01-01 RX ORDER — FUROSEMIDE 40 MG/1
40 TABLET ORAL DAILY
Status: DISCONTINUED | OUTPATIENT
Start: 2020-01-01 | End: 2020-01-01

## 2020-01-01 RX ORDER — MORPHINE SULFATE 2 MG/ML
2 INJECTION, SOLUTION INTRAMUSCULAR; INTRAVENOUS
Status: DISCONTINUED | OUTPATIENT
Start: 2020-01-01 | End: 2020-01-01 | Stop reason: HOSPADM

## 2020-01-01 RX ORDER — CEPHALEXIN 250 MG/1
500 CAPSULE ORAL 3 TIMES DAILY
Status: DISCONTINUED | OUTPATIENT
Start: 2020-01-01 | End: 2020-01-01 | Stop reason: HOSPADM

## 2020-01-01 RX ORDER — FLUMAZENIL 0.1 MG/ML
0.2 INJECTION INTRAVENOUS
Status: DISCONTINUED | OUTPATIENT
Start: 2020-01-01 | End: 2020-01-01

## 2020-01-01 RX ORDER — LANOLIN ALCOHOL/MO/W.PET/CERES
1000 CREAM (GRAM) TOPICAL DAILY
Qty: 30 TAB | Refills: 0 | Status: SHIPPED
Start: 2020-01-01 | End: 2021-01-01

## 2020-01-01 RX ORDER — ASPIRIN 81 MG/1
81 TABLET ORAL DAILY
Status: DISCONTINUED | OUTPATIENT
Start: 2020-01-01 | End: 2020-01-01 | Stop reason: HOSPADM

## 2020-01-01 RX ORDER — SODIUM CHLORIDE, SODIUM LACTATE, POTASSIUM CHLORIDE, CALCIUM CHLORIDE 600; 310; 30; 20 MG/100ML; MG/100ML; MG/100ML; MG/100ML
125 INJECTION, SOLUTION INTRAVENOUS CONTINUOUS
Status: DISCONTINUED | OUTPATIENT
Start: 2020-01-01 | End: 2020-01-01

## 2020-01-01 RX ORDER — COLCHICINE 0.6 MG/1
0.6 TABLET ORAL 2 TIMES DAILY
Status: DISPENSED | OUTPATIENT
Start: 2020-01-01 | End: 2020-01-01

## 2020-01-01 RX ORDER — MORPHINE SULFATE 2 MG/ML
1 INJECTION, SOLUTION INTRAMUSCULAR; INTRAVENOUS
Status: DISCONTINUED | OUTPATIENT
Start: 2020-01-01 | End: 2020-01-01 | Stop reason: HOSPADM

## 2020-01-01 RX ORDER — IPRATROPIUM BROMIDE 0.5 MG/2.5ML
0.5 SOLUTION RESPIRATORY (INHALATION)
Status: DISCONTINUED | OUTPATIENT
Start: 2020-01-01 | End: 2020-01-01

## 2020-01-01 RX ORDER — ONDANSETRON 2 MG/ML
4 INJECTION INTRAMUSCULAR; INTRAVENOUS AS NEEDED
Status: DISCONTINUED | OUTPATIENT
Start: 2020-01-01 | End: 2020-01-01 | Stop reason: HOSPADM

## 2020-01-01 RX ORDER — NALOXONE HYDROCHLORIDE 0.4 MG/ML
0.4 INJECTION, SOLUTION INTRAMUSCULAR; INTRAVENOUS; SUBCUTANEOUS
Status: DISCONTINUED | OUTPATIENT
Start: 2020-01-01 | End: 2020-01-01

## 2020-01-01 RX ORDER — EPHEDRINE SULFATE/0.9% NACL/PF 50 MG/5 ML
SYRINGE (ML) INTRAVENOUS AS NEEDED
Status: DISCONTINUED | OUTPATIENT
Start: 2020-01-01 | End: 2020-01-01 | Stop reason: HOSPADM

## 2020-01-01 RX ORDER — ACETAMINOPHEN 325 MG/1
325 TABLET ORAL
Qty: 30 TAB | Refills: 0 | Status: SHIPPED
Start: 2020-01-01

## 2020-01-01 RX ORDER — BUDESONIDE 0.5 MG/2ML
500 INHALANT ORAL
Status: DISCONTINUED | OUTPATIENT
Start: 2020-01-01 | End: 2020-01-01 | Stop reason: HOSPADM

## 2020-01-01 RX ORDER — HYDROCORTISONE SODIUM SUCCINATE 100 MG/2ML
100 INJECTION, POWDER, FOR SOLUTION INTRAMUSCULAR; INTRAVENOUS EVERY 8 HOURS
Status: DISCONTINUED | OUTPATIENT
Start: 2020-01-01 | End: 2020-01-01

## 2020-01-01 RX ORDER — POTASSIUM CHLORIDE 750 MG/1
40 TABLET, FILM COATED, EXTENDED RELEASE ORAL
Status: COMPLETED | OUTPATIENT
Start: 2020-01-01 | End: 2020-01-01

## 2020-01-01 RX ORDER — LOPERAMIDE HYDROCHLORIDE 2 MG/1
4 CAPSULE ORAL 2 TIMES DAILY
Status: ON HOLD | COMMUNITY
End: 2021-01-01 | Stop reason: SDUPTHER

## 2020-01-01 RX ORDER — ROCURONIUM BROMIDE 10 MG/ML
INJECTION, SOLUTION INTRAVENOUS AS NEEDED
Status: DISCONTINUED | OUTPATIENT
Start: 2020-01-01 | End: 2020-01-01 | Stop reason: HOSPADM

## 2020-01-01 RX ORDER — HYDROCORTISONE SODIUM SUCCINATE 100 MG/2ML
50 INJECTION, POWDER, FOR SOLUTION INTRAMUSCULAR; INTRAVENOUS EVERY 8 HOURS
Status: DISCONTINUED | OUTPATIENT
Start: 2020-01-01 | End: 2020-01-01

## 2020-01-01 RX ORDER — LIDOCAINE HYDROCHLORIDE 10 MG/ML
0.1 INJECTION, SOLUTION EPIDURAL; INFILTRATION; INTRACAUDAL; PERINEURAL AS NEEDED
Status: DISCONTINUED | OUTPATIENT
Start: 2020-01-01 | End: 2020-01-01

## 2020-01-01 RX ORDER — MONTELUKAST SODIUM 10 MG/1
10 TABLET ORAL DAILY
Status: DISCONTINUED | OUTPATIENT
Start: 2020-01-01 | End: 2020-01-01 | Stop reason: HOSPADM

## 2020-01-01 RX ORDER — SODIUM CHLORIDE 9 MG/ML
50 INJECTION, SOLUTION INTRAVENOUS CONTINUOUS
Status: DISCONTINUED | OUTPATIENT
Start: 2020-01-01 | End: 2020-01-01

## 2020-01-01 RX ORDER — SODIUM CHLORIDE 0.9 % (FLUSH) 0.9 %
5-40 SYRINGE (ML) INJECTION EVERY 8 HOURS
Status: DISCONTINUED | OUTPATIENT
Start: 2020-01-01 | End: 2020-01-01 | Stop reason: HOSPADM

## 2020-01-01 RX ORDER — FUROSEMIDE 20 MG/1
20 TABLET ORAL DAILY
Status: DISCONTINUED | OUTPATIENT
Start: 2020-01-01 | End: 2020-01-01 | Stop reason: HOSPADM

## 2020-01-01 RX ORDER — SODIUM CHLORIDE 9 MG/ML
75 INJECTION, SOLUTION INTRAVENOUS CONTINUOUS
Status: DISPENSED | OUTPATIENT
Start: 2020-01-01 | End: 2020-01-01

## 2020-01-01 RX ORDER — CEFAZOLIN SODIUM/WATER 2 G/20 ML
2 SYRINGE (ML) INTRAVENOUS ONCE
Status: DISCONTINUED | OUTPATIENT
Start: 2020-01-01 | End: 2020-01-01

## 2020-01-01 RX ORDER — BUDESONIDE 0.5 MG/2ML
500 INHALANT ORAL
Status: DISCONTINUED | OUTPATIENT
Start: 2020-01-01 | End: 2020-01-01

## 2020-01-01 RX ORDER — ARFORMOTEROL TARTRATE 15 UG/2ML
15 SOLUTION RESPIRATORY (INHALATION)
Status: DISCONTINUED | OUTPATIENT
Start: 2020-01-01 | End: 2020-01-01

## 2020-01-01 RX ORDER — DIGOXIN 125 MCG
0.12 TABLET ORAL DAILY
Qty: 30 TAB | Refills: 0 | Status: SHIPPED
Start: 2020-01-01 | End: 2021-01-01

## 2020-01-01 RX ORDER — COLCHICINE 0.6 MG/1
0.6 TABLET ORAL 2 TIMES DAILY
Status: COMPLETED | OUTPATIENT
Start: 2020-01-01 | End: 2020-01-01

## 2020-01-01 RX ORDER — METOPROLOL TARTRATE 25 MG/1
12.5 TABLET, FILM COATED ORAL 2 TIMES DAILY
Status: DISCONTINUED | OUTPATIENT
Start: 2020-01-01 | End: 2020-01-01 | Stop reason: HOSPADM

## 2020-01-01 RX ORDER — HYDROCODONE BITARTRATE AND ACETAMINOPHEN 5; 325 MG/1; MG/1
1 TABLET ORAL
Status: DISCONTINUED | OUTPATIENT
Start: 2020-01-01 | End: 2020-01-01

## 2020-01-01 RX ORDER — HYDROMORPHONE HYDROCHLORIDE 2 MG/ML
1 INJECTION, SOLUTION INTRAMUSCULAR; INTRAVENOUS; SUBCUTANEOUS ONCE
Status: COMPLETED | OUTPATIENT
Start: 2020-01-01 | End: 2020-01-01

## 2020-01-01 RX ORDER — OXYCODONE HYDROCHLORIDE 5 MG/1
5 TABLET ORAL
Status: DISCONTINUED | OUTPATIENT
Start: 2020-01-01 | End: 2020-01-01 | Stop reason: HOSPADM

## 2020-01-01 RX ORDER — SODIUM CHLORIDE AND POTASSIUM CHLORIDE .9; .15 G/100ML; G/100ML
SOLUTION INTRAVENOUS CONTINUOUS
Status: DISCONTINUED | OUTPATIENT
Start: 2020-01-01 | End: 2020-01-01 | Stop reason: HOSPADM

## 2020-01-01 RX ORDER — LIDOCAINE HYDROCHLORIDE 20 MG/ML
INJECTION, SOLUTION EPIDURAL; INFILTRATION; INTRACAUDAL; PERINEURAL AS NEEDED
Status: DISCONTINUED | OUTPATIENT
Start: 2020-01-01 | End: 2020-01-01 | Stop reason: HOSPADM

## 2020-01-01 RX ORDER — IPRATROPIUM BROMIDE AND ALBUTEROL SULFATE 2.5; .5 MG/3ML; MG/3ML
3 SOLUTION RESPIRATORY (INHALATION)
Status: DISCONTINUED | OUTPATIENT
Start: 2020-01-01 | End: 2020-01-01

## 2020-01-01 RX ORDER — SUCCINYLCHOLINE CHLORIDE 20 MG/ML
INJECTION INTRAMUSCULAR; INTRAVENOUS AS NEEDED
Status: DISCONTINUED | OUTPATIENT
Start: 2020-01-01 | End: 2020-01-01 | Stop reason: HOSPADM

## 2020-01-01 RX ORDER — HONEY 100 %
PASTE (ML) TOPICAL DAILY
Status: ON HOLD | COMMUNITY
End: 2021-01-01 | Stop reason: CLARIF

## 2020-01-01 RX ORDER — POLYETHYLENE GLYCOL 3350 17 G/17G
17 POWDER, FOR SOLUTION ORAL ONCE
Status: COMPLETED | OUTPATIENT
Start: 2020-01-01 | End: 2020-01-01

## 2020-01-01 RX ORDER — DIGOXIN 0.25 MG/ML
250 INJECTION INTRAMUSCULAR; INTRAVENOUS EVERY 6 HOURS
Status: COMPLETED | OUTPATIENT
Start: 2020-01-01 | End: 2020-01-01

## 2020-01-01 RX ORDER — IPRATROPIUM BROMIDE 0.5 MG/2.5ML
0.5 SOLUTION RESPIRATORY (INHALATION)
Status: DISCONTINUED | OUTPATIENT
Start: 2020-01-01 | End: 2020-01-01 | Stop reason: HOSPADM

## 2020-01-01 RX ORDER — CEPHALEXIN 500 MG/1
500 CAPSULE ORAL 3 TIMES DAILY
Qty: 21 CAP | Refills: 0 | Status: SHIPPED
Start: 2020-01-01 | End: 2020-01-01

## 2020-01-01 RX ORDER — SODIUM CHLORIDE 9 MG/ML
50 INJECTION, SOLUTION INTRAVENOUS CONTINUOUS
Status: CANCELLED | OUTPATIENT
Start: 2020-01-01 | End: 2020-01-01

## 2020-01-01 RX ORDER — LANOLIN ALCOHOL/MO/W.PET/CERES
1 CREAM (GRAM) TOPICAL
Status: DISCONTINUED | OUTPATIENT
Start: 2020-01-01 | End: 2020-01-01 | Stop reason: HOSPADM

## 2020-01-01 RX ORDER — LANOLIN ALCOHOL/MO/W.PET/CERES
325 CREAM (GRAM) TOPICAL
Qty: 30 TAB | Refills: 0 | Status: SHIPPED
Start: 2020-01-01

## 2020-01-01 RX ORDER — HYDROMORPHONE HYDROCHLORIDE 1 MG/ML
.5-1 INJECTION, SOLUTION INTRAMUSCULAR; INTRAVENOUS; SUBCUTANEOUS
Status: DISCONTINUED | OUTPATIENT
Start: 2020-01-01 | End: 2020-01-01 | Stop reason: HOSPADM

## 2020-01-01 RX ORDER — PREDNISONE 20 MG/1
20 TABLET ORAL 2 TIMES DAILY WITH MEALS
Status: DISCONTINUED | OUTPATIENT
Start: 2020-01-01 | End: 2020-01-01 | Stop reason: HOSPADM

## 2020-01-01 RX ORDER — GRANULES FOR ORAL 3 G/1
3 POWDER ORAL ONCE
Status: COMPLETED | OUTPATIENT
Start: 2020-01-01 | End: 2020-01-01

## 2020-01-01 RX ORDER — LIDOCAINE HYDROCHLORIDE 10 MG/ML
10 INJECTION INFILTRATION; PERINEURAL
Status: COMPLETED | OUTPATIENT
Start: 2020-01-01 | End: 2020-01-01

## 2020-01-01 RX ORDER — METOPROLOL TARTRATE 25 MG/1
25 TABLET, FILM COATED ORAL EVERY 12 HOURS
Status: DISCONTINUED | OUTPATIENT
Start: 2020-01-01 | End: 2020-01-01 | Stop reason: HOSPADM

## 2020-01-01 RX ORDER — FENTANYL CITRATE 50 UG/ML
INJECTION, SOLUTION INTRAMUSCULAR; INTRAVENOUS AS NEEDED
Status: DISCONTINUED | OUTPATIENT
Start: 2020-01-01 | End: 2020-01-01 | Stop reason: HOSPADM

## 2020-01-01 RX ORDER — PROPOFOL 10 MG/ML
INJECTION, EMULSION INTRAVENOUS AS NEEDED
Status: DISCONTINUED | OUTPATIENT
Start: 2020-01-01 | End: 2020-01-01 | Stop reason: HOSPADM

## 2020-01-01 RX ORDER — MONTELUKAST SODIUM 10 MG/1
10 TABLET ORAL EVERY EVENING
Status: DISCONTINUED | OUTPATIENT
Start: 2020-01-01 | End: 2020-01-01 | Stop reason: HOSPADM

## 2020-01-01 RX ORDER — FEBUXOSTAT 40 MG/1
40 TABLET, FILM COATED ORAL DAILY
Qty: 30 TAB | Refills: 3 | Status: SHIPPED
Start: 2020-01-01 | End: 2020-01-01

## 2020-01-01 RX ORDER — METOPROLOL TARTRATE 25 MG/1
12.5 TABLET, FILM COATED ORAL 2 TIMES DAILY
Qty: 30 TAB | Refills: 0 | Status: SHIPPED
Start: 2020-01-01 | End: 2020-01-01

## 2020-01-01 RX ORDER — FUROSEMIDE 40 MG/1
40 TABLET ORAL ONCE
Status: COMPLETED | OUTPATIENT
Start: 2020-01-01 | End: 2020-01-01

## 2020-01-01 RX ORDER — ALOGLIPTIN 25 MG/1
25 TABLET, FILM COATED ORAL DAILY
Status: DISCONTINUED | OUTPATIENT
Start: 2020-01-01 | End: 2020-01-01 | Stop reason: HOSPADM

## 2020-01-01 RX ADMIN — Medication 10 ML: at 06:30

## 2020-01-01 RX ADMIN — HYDROCORTISONE SODIUM SUCCINATE 100 MG: 100 INJECTION, POWDER, FOR SOLUTION INTRAMUSCULAR; INTRAVENOUS at 13:17

## 2020-01-01 RX ADMIN — PANTOPRAZOLE SODIUM 40 MG: 40 TABLET, DELAYED RELEASE ORAL at 16:41

## 2020-01-01 RX ADMIN — INSULIN LISPRO 7 UNITS: 100 INJECTION, SOLUTION INTRAVENOUS; SUBCUTANEOUS at 17:44

## 2020-01-01 RX ADMIN — CEPHALEXIN 500 MG: 250 CAPSULE ORAL at 22:25

## 2020-01-01 RX ADMIN — DIGOXIN 250 MCG: 0.25 INJECTION INTRAMUSCULAR; INTRAVENOUS at 15:04

## 2020-01-01 RX ADMIN — Medication 10 ML: at 14:00

## 2020-01-01 RX ADMIN — PREDNISONE 10 MG: 5 TABLET ORAL at 08:46

## 2020-01-01 RX ADMIN — Medication 10 ML: at 06:00

## 2020-01-01 RX ADMIN — LOPERAMIDE HYDROCHLORIDE 4 MG: 2 CAPSULE ORAL at 09:46

## 2020-01-01 RX ADMIN — PANTOPRAZOLE SODIUM 40 MG: 40 TABLET, DELAYED RELEASE ORAL at 08:22

## 2020-01-01 RX ADMIN — SODIUM CHLORIDE 40 MG: 9 INJECTION INTRAMUSCULAR; INTRAVENOUS; SUBCUTANEOUS at 09:20

## 2020-01-01 RX ADMIN — FERROUS SULFATE TAB 325 MG (65 MG ELEMENTAL FE) 325 MG: 325 (65 FE) TAB at 11:25

## 2020-01-01 RX ADMIN — Medication: at 15:00

## 2020-01-01 RX ADMIN — PANTOPRAZOLE SODIUM 40 MG: 40 TABLET, DELAYED RELEASE ORAL at 15:56

## 2020-01-01 RX ADMIN — FERROUS SULFATE TAB 325 MG (65 MG ELEMENTAL FE) 325 MG: 325 (65 FE) TAB at 08:46

## 2020-01-01 RX ADMIN — ARFORMOTEROL TARTRATE 15 MCG: 15 SOLUTION RESPIRATORY (INHALATION) at 19:49

## 2020-01-01 RX ADMIN — POTASSIUM CHLORIDE 10 MEQ: 750 TABLET, FILM COATED, EXTENDED RELEASE ORAL at 17:53

## 2020-01-01 RX ADMIN — Medication 400 MG: at 22:31

## 2020-01-01 RX ADMIN — BUDESONIDE 500 MCG: 0.5 INHALANT RESPIRATORY (INHALATION) at 07:32

## 2020-01-01 RX ADMIN — SODIUM CHLORIDE 75 ML/HR: 900 INJECTION, SOLUTION INTRAVENOUS at 21:13

## 2020-01-01 RX ADMIN — SODIUM CHLORIDE 2.5 MG/HR: 900 INJECTION, SOLUTION INTRAVENOUS at 07:10

## 2020-01-01 RX ADMIN — LOPERAMIDE HYDROCHLORIDE 4 MG: 2 CAPSULE ORAL at 08:59

## 2020-01-01 RX ADMIN — Medication 10 ML: at 21:51

## 2020-01-01 RX ADMIN — METOPROLOL TARTRATE 25 MG: 25 TABLET, FILM COATED ORAL at 11:25

## 2020-01-01 RX ADMIN — Medication 50 MCG: at 08:32

## 2020-01-01 RX ADMIN — PANTOPRAZOLE SODIUM 40 MG: 40 TABLET, DELAYED RELEASE ORAL at 06:29

## 2020-01-01 RX ADMIN — BUDESONIDE 500 MCG: 0.5 INHALANT RESPIRATORY (INHALATION) at 11:05

## 2020-01-01 RX ADMIN — IPRATROPIUM BROMIDE 0.5 MG: 0.5 SOLUTION RESPIRATORY (INHALATION) at 19:48

## 2020-01-01 RX ADMIN — MIRTAZAPINE 15 MG: 15 TABLET, FILM COATED ORAL at 22:52

## 2020-01-01 RX ADMIN — CYANOCOBALAMIN TAB 500 MCG 1000 MCG: 500 TAB at 11:32

## 2020-01-01 RX ADMIN — DULOXETINE HYDROCHLORIDE 60 MG: 30 CAPSULE, DELAYED RELEASE ORAL at 09:28

## 2020-01-01 RX ADMIN — INSULIN LISPRO 3 UNITS: 100 INJECTION, SOLUTION INTRAVENOUS; SUBCUTANEOUS at 09:18

## 2020-01-01 RX ADMIN — PREDNISONE 10 MG: 5 TABLET ORAL at 08:52

## 2020-01-01 RX ADMIN — CYANOCOBALAMIN TAB 500 MCG 1000 MCG: 500 TAB at 08:20

## 2020-01-01 RX ADMIN — PREDNISONE 10 MG: 5 TABLET ORAL at 08:40

## 2020-01-01 RX ADMIN — Medication 10 ML: at 21:32

## 2020-01-01 RX ADMIN — SODIUM CHLORIDE 999 ML/HR: 900 INJECTION, SOLUTION INTRAVENOUS at 14:36

## 2020-01-01 RX ADMIN — CEFEPIME 2 G: 2 INJECTION, POWDER, FOR SOLUTION INTRAVENOUS at 22:34

## 2020-01-01 RX ADMIN — DULOXETINE 60 MG: 30 CAPSULE, DELAYED RELEASE ORAL at 11:22

## 2020-01-01 RX ADMIN — BUDESONIDE 500 MCG: 0.5 INHALANT RESPIRATORY (INHALATION) at 20:09

## 2020-01-01 RX ADMIN — LOPERAMIDE HYDROCHLORIDE 4 MG: 2 CAPSULE ORAL at 17:53

## 2020-01-01 RX ADMIN — POTASSIUM CHLORIDE 10 MEQ: 750 TABLET, FILM COATED, EXTENDED RELEASE ORAL at 17:00

## 2020-01-01 RX ADMIN — Medication 10 ML: at 22:55

## 2020-01-01 RX ADMIN — POTASSIUM CHLORIDE 10 MEQ: 750 TABLET, FILM COATED, EXTENDED RELEASE ORAL at 17:55

## 2020-01-01 RX ADMIN — Medication 10 ML: at 06:08

## 2020-01-01 RX ADMIN — Medication 1 CAPSULE: at 08:40

## 2020-01-01 RX ADMIN — DIGOXIN 0.12 MG: 125 TABLET ORAL at 08:25

## 2020-01-01 RX ADMIN — PREDNISONE 20 MG: 20 TABLET ORAL at 18:16

## 2020-01-01 RX ADMIN — SODIUM CHLORIDE 1000 ML: 900 INJECTION, SOLUTION INTRAVENOUS at 15:22

## 2020-01-01 RX ADMIN — PREDNISONE 20 MG: 20 TABLET ORAL at 08:13

## 2020-01-01 RX ADMIN — ASPIRIN 81 MG: 81 TABLET, COATED ORAL at 09:28

## 2020-01-01 RX ADMIN — TRAMADOL HYDROCHLORIDE 50 MG: 50 TABLET, FILM COATED ORAL at 11:13

## 2020-01-01 RX ADMIN — HYDROCORTISONE SODIUM SUCCINATE 100 MG: 100 INJECTION, POWDER, FOR SOLUTION INTRAMUSCULAR; INTRAVENOUS at 13:32

## 2020-01-01 RX ADMIN — METOPROLOL TARTRATE 12.5 MG: 25 TABLET, FILM COATED ORAL at 02:15

## 2020-01-01 RX ADMIN — Medication 50 MCG: at 08:20

## 2020-01-01 RX ADMIN — APIXABAN 5 MG: 5 TABLET, FILM COATED ORAL at 08:21

## 2020-01-01 RX ADMIN — MIRTAZAPINE 15 MG: 15 TABLET, FILM COATED ORAL at 22:01

## 2020-01-01 RX ADMIN — INSULIN LISPRO 2 UNITS: 100 INJECTION, SOLUTION INTRAVENOUS; SUBCUTANEOUS at 12:48

## 2020-01-01 RX ADMIN — Medication 10 ML: at 06:53

## 2020-01-01 RX ADMIN — DIGOXIN 0.12 MG: 125 TABLET ORAL at 09:18

## 2020-01-01 RX ADMIN — DULOXETINE 60 MG: 30 CAPSULE, DELAYED RELEASE ORAL at 11:32

## 2020-01-01 RX ADMIN — IPRATROPIUM BROMIDE 0.5 MG: 0.5 SOLUTION RESPIRATORY (INHALATION) at 20:30

## 2020-01-01 RX ADMIN — INSULIN LISPRO 2 UNITS: 100 INJECTION, SOLUTION INTRAVENOUS; SUBCUTANEOUS at 12:20

## 2020-01-01 RX ADMIN — INSULIN LISPRO 3 UNITS: 100 INJECTION, SOLUTION INTRAVENOUS; SUBCUTANEOUS at 16:46

## 2020-01-01 RX ADMIN — METOPROLOL TARTRATE 12.5 MG: 25 TABLET, FILM COATED ORAL at 09:19

## 2020-01-01 RX ADMIN — OXYCODONE 5 MG: 5 TABLET ORAL at 03:41

## 2020-01-01 RX ADMIN — ARFORMOTEROL TARTRATE 15 MCG: 15 SOLUTION RESPIRATORY (INHALATION) at 11:05

## 2020-01-01 RX ADMIN — CEPHALEXIN 500 MG: 250 CAPSULE ORAL at 17:00

## 2020-01-01 RX ADMIN — BUDESONIDE 500 MCG: 0.5 INHALANT RESPIRATORY (INHALATION) at 20:19

## 2020-01-01 RX ADMIN — METOPROLOL TARTRATE 12.5 MG: 25 TABLET, FILM COATED ORAL at 13:18

## 2020-01-01 RX ADMIN — HYDROCORTISONE SODIUM SUCCINATE 25 MG: 100 INJECTION, POWDER, FOR SOLUTION INTRAMUSCULAR; INTRAVENOUS at 22:47

## 2020-01-01 RX ADMIN — ARFORMOTEROL TARTRATE: 15 SOLUTION RESPIRATORY (INHALATION) at 07:28

## 2020-01-01 RX ADMIN — Medication 10 ML: at 22:47

## 2020-01-01 RX ADMIN — Medication 10 ML: at 21:05

## 2020-01-01 RX ADMIN — BUDESONIDE 500 MCG: 0.5 INHALANT RESPIRATORY (INHALATION) at 20:30

## 2020-01-01 RX ADMIN — HYDROCODONE BITARTRATE AND ACETAMINOPHEN 1 TABLET: 5; 325 TABLET ORAL at 17:41

## 2020-01-01 RX ADMIN — ONDANSETRON 4 MG: 2 INJECTION INTRAMUSCULAR; INTRAVENOUS at 09:53

## 2020-01-01 RX ADMIN — METOPROLOL TARTRATE 25 MG: 25 TABLET, FILM COATED ORAL at 08:12

## 2020-01-01 RX ADMIN — SODIUM CHLORIDE 250 ML: 900 INJECTION, SOLUTION INTRAVENOUS at 22:41

## 2020-01-01 RX ADMIN — CYANOCOBALAMIN TAB 500 MCG 1000 MCG: 500 TAB at 08:46

## 2020-01-01 RX ADMIN — CEFTRIAXONE 2 G: 2 INJECTION, POWDER, FOR SOLUTION INTRAMUSCULAR; INTRAVENOUS at 18:03

## 2020-01-01 RX ADMIN — APIXABAN 5 MG: 5 TABLET, FILM COATED ORAL at 08:59

## 2020-01-01 RX ADMIN — DIGOXIN 0.12 MG: 125 TABLET ORAL at 09:19

## 2020-01-01 RX ADMIN — INSULIN LISPRO 2 UNITS: 100 INJECTION, SOLUTION INTRAVENOUS; SUBCUTANEOUS at 08:11

## 2020-01-01 RX ADMIN — APIXABAN 5 MG: 5 TABLET, FILM COATED ORAL at 17:00

## 2020-01-01 RX ADMIN — BUDESONIDE 500 MCG: 0.5 INHALANT RESPIRATORY (INHALATION) at 07:48

## 2020-01-01 RX ADMIN — SODIUM CHLORIDE 1000 ML: 900 INJECTION, SOLUTION INTRAVENOUS at 14:41

## 2020-01-01 RX ADMIN — HYDROCORTISONE SODIUM SUCCINATE 100 MG: 100 INJECTION, POWDER, FOR SOLUTION INTRAMUSCULAR; INTRAVENOUS at 14:00

## 2020-01-01 RX ADMIN — HYDROCODONE BITARTRATE AND ACETAMINOPHEN 1 TABLET: 5; 325 TABLET ORAL at 01:03

## 2020-01-01 RX ADMIN — Medication 1 CAPSULE: at 09:34

## 2020-01-01 RX ADMIN — CEFEPIME HYDROCHLORIDE 2 G: 2 INJECTION, POWDER, FOR SOLUTION INTRAVENOUS at 03:31

## 2020-01-01 RX ADMIN — POTASSIUM CHLORIDE 10 MEQ: 750 TABLET, FILM COATED, EXTENDED RELEASE ORAL at 17:08

## 2020-01-01 RX ADMIN — APIXABAN 5 MG: 5 TABLET, FILM COATED ORAL at 08:49

## 2020-01-01 RX ADMIN — PREDNISONE 10 MG: 5 TABLET ORAL at 17:00

## 2020-01-01 RX ADMIN — DULOXETINE 60 MG: 30 CAPSULE, DELAYED RELEASE ORAL at 08:11

## 2020-01-01 RX ADMIN — MORPHINE SULFATE 1 MG: 2 INJECTION, SOLUTION INTRAMUSCULAR; INTRAVENOUS at 18:01

## 2020-01-01 RX ADMIN — HYDROCORTISONE SODIUM SUCCINATE 100 MG: 100 INJECTION, POWDER, FOR SOLUTION INTRAMUSCULAR; INTRAVENOUS at 05:45

## 2020-01-01 RX ADMIN — IOPAMIDOL 30 ML: 408 INJECTION, SOLUTION INTRATHECAL at 08:37

## 2020-01-01 RX ADMIN — IPRATROPIUM BROMIDE 0.5 MG: 0.5 SOLUTION RESPIRATORY (INHALATION) at 20:15

## 2020-01-01 RX ADMIN — IOPAMIDOL 100 ML: 612 INJECTION, SOLUTION INTRAVENOUS at 13:45

## 2020-01-01 RX ADMIN — VANCOMYCIN HYDROCHLORIDE 1750 MG: 10 INJECTION, POWDER, LYOPHILIZED, FOR SOLUTION INTRAVENOUS at 17:29

## 2020-01-01 RX ADMIN — APIXABAN 5 MG: 5 TABLET, FILM COATED ORAL at 17:40

## 2020-01-01 RX ADMIN — CEFEPIME 2 G: 2 INJECTION, POWDER, FOR SOLUTION INTRAVENOUS at 06:29

## 2020-01-01 RX ADMIN — APIXABAN 5 MG: 5 TABLET, FILM COATED ORAL at 17:55

## 2020-01-01 RX ADMIN — INSULIN LISPRO 3 UNITS: 100 INJECTION, SOLUTION INTRAVENOUS; SUBCUTANEOUS at 17:39

## 2020-01-01 RX ADMIN — MIRTAZAPINE 15 MG: 15 TABLET, FILM COATED ORAL at 21:18

## 2020-01-01 RX ADMIN — ARFORMOTEROL TARTRATE 15 MCG: 15 SOLUTION RESPIRATORY (INHALATION) at 08:07

## 2020-01-01 RX ADMIN — Medication 10 MG: at 08:18

## 2020-01-01 RX ADMIN — FEBUXOSTAT 40 MG: 40 TABLET, FILM COATED ORAL at 09:26

## 2020-01-01 RX ADMIN — POTASSIUM CHLORIDE 10 MEQ: 750 TABLET, FILM COATED, EXTENDED RELEASE ORAL at 18:03

## 2020-01-01 RX ADMIN — IPRATROPIUM BROMIDE 0.5 MG: 0.5 SOLUTION RESPIRATORY (INHALATION) at 07:49

## 2020-01-01 RX ADMIN — MONTELUKAST SODIUM 10 MG: 10 TABLET, FILM COATED ORAL at 17:53

## 2020-01-01 RX ADMIN — SODIUM CHLORIDE 40 MG: 9 INJECTION INTRAMUSCULAR; INTRAVENOUS; SUBCUTANEOUS at 21:18

## 2020-01-01 RX ADMIN — POTASSIUM CHLORIDE 10 MEQ: 750 TABLET, FILM COATED, EXTENDED RELEASE ORAL at 08:20

## 2020-01-01 RX ADMIN — HYDROCODONE BITARTRATE AND ACETAMINOPHEN 1 TABLET: 5; 325 TABLET ORAL at 17:02

## 2020-01-01 RX ADMIN — INSULIN LISPRO 2 UNITS: 100 INJECTION, SOLUTION INTRAVENOUS; SUBCUTANEOUS at 23:10

## 2020-01-01 RX ADMIN — HYDROCORTISONE SODIUM SUCCINATE 25 MG: 100 INJECTION, POWDER, FOR SOLUTION INTRAMUSCULAR; INTRAVENOUS at 15:07

## 2020-01-01 RX ADMIN — APIXABAN 5 MG: 5 TABLET, FILM COATED ORAL at 17:53

## 2020-01-01 RX ADMIN — Medication 1 CAPSULE: at 08:21

## 2020-01-01 RX ADMIN — VANCOMYCIN HYDROCHLORIDE 1500 MG: 10 INJECTION, POWDER, LYOPHILIZED, FOR SOLUTION INTRAVENOUS at 17:57

## 2020-01-01 RX ADMIN — HYDROCODONE BITARTRATE AND ACETAMINOPHEN 1 TABLET: 5; 325 TABLET ORAL at 17:00

## 2020-01-01 RX ADMIN — PREDNISONE 10 MG: 5 TABLET ORAL at 08:49

## 2020-01-01 RX ADMIN — Medication 10 ML: at 06:19

## 2020-01-01 RX ADMIN — PANTOPRAZOLE SODIUM 40 MG: 40 TABLET, DELAYED RELEASE ORAL at 06:09

## 2020-01-01 RX ADMIN — PREDNISONE 20 MG: 20 TABLET ORAL at 16:58

## 2020-01-01 RX ADMIN — CEFAZOLIN SODIUM 2 G: 1 POWDER, FOR SOLUTION INTRAMUSCULAR; INTRAVENOUS at 08:10

## 2020-01-01 RX ADMIN — MIRTAZAPINE 15 MG: 15 TABLET, FILM COATED ORAL at 22:31

## 2020-01-01 RX ADMIN — Medication 10 ML: at 22:34

## 2020-01-01 RX ADMIN — PREDNISONE 10 MG: 5 TABLET ORAL at 09:46

## 2020-01-01 RX ADMIN — CYANOCOBALAMIN TAB 500 MCG 1000 MCG: 500 TAB at 08:58

## 2020-01-01 RX ADMIN — MIRTAZAPINE 15 MG: 15 TABLET, FILM COATED ORAL at 21:52

## 2020-01-01 RX ADMIN — TRAMADOL HYDROCHLORIDE 50 MG: 50 TABLET, FILM COATED ORAL at 12:50

## 2020-01-01 RX ADMIN — CYANOCOBALAMIN TAB 500 MCG 1000 MCG: 500 TAB at 11:24

## 2020-01-01 RX ADMIN — MONTELUKAST SODIUM 10 MG: 10 TABLET, FILM COATED ORAL at 11:24

## 2020-01-01 RX ADMIN — Medication 1 CAPSULE: at 08:59

## 2020-01-01 RX ADMIN — FERROUS SULFATE TAB 325 MG (65 MG ELEMENTAL FE) 325 MG: 325 (65 FE) TAB at 09:19

## 2020-01-01 RX ADMIN — FERROUS SULFATE TAB 325 MG (65 MG ELEMENTAL FE) 325 MG: 325 (65 FE) TAB at 11:32

## 2020-01-01 RX ADMIN — PROCHLORPERAZINE EDISYLATE 5 MG: 5 INJECTION INTRAMUSCULAR; INTRAVENOUS at 23:04

## 2020-01-01 RX ADMIN — METOPROLOL TARTRATE 25 MG: 25 TABLET, FILM COATED ORAL at 20:54

## 2020-01-01 RX ADMIN — IPRATROPIUM BROMIDE 0.5 MG: 0.5 SOLUTION RESPIRATORY (INHALATION) at 10:00

## 2020-01-01 RX ADMIN — ACETAMINOPHEN 650 MG: 325 TABLET ORAL at 22:30

## 2020-01-01 RX ADMIN — INSULIN LISPRO 2 UNITS: 100 INJECTION, SOLUTION INTRAVENOUS; SUBCUTANEOUS at 17:53

## 2020-01-01 RX ADMIN — Medication 10 ML: at 06:16

## 2020-01-01 RX ADMIN — SODIUM CHLORIDE 125 ML/HR: 900 INJECTION, SOLUTION INTRAVENOUS at 08:16

## 2020-01-01 RX ADMIN — Medication 10 ML: at 16:44

## 2020-01-01 RX ADMIN — Medication 10 ML: at 05:45

## 2020-01-01 RX ADMIN — DULOXETINE HYDROCHLORIDE 60 MG: 30 CAPSULE, DELAYED RELEASE ORAL at 08:38

## 2020-01-01 RX ADMIN — WATER 2 G: 1 INJECTION INTRAMUSCULAR; INTRAVENOUS; SUBCUTANEOUS at 01:00

## 2020-01-01 RX ADMIN — IPRATROPIUM BROMIDE 0.5 MG: 0.5 SOLUTION RESPIRATORY (INHALATION) at 13:54

## 2020-01-01 RX ADMIN — ASPIRIN 81 MG: 81 TABLET, COATED ORAL at 08:12

## 2020-01-01 RX ADMIN — DIGOXIN 0.12 MG: 125 TABLET ORAL at 08:11

## 2020-01-01 RX ADMIN — Medication 10 ML: at 05:47

## 2020-01-01 RX ADMIN — PREDNISONE 10 MG: 5 TABLET ORAL at 17:55

## 2020-01-01 RX ADMIN — PANTOPRAZOLE SODIUM 40 MG: 40 TABLET, DELAYED RELEASE ORAL at 17:39

## 2020-01-01 RX ADMIN — INSULIN LISPRO 2 UNITS: 100 INJECTION, SOLUTION INTRAVENOUS; SUBCUTANEOUS at 09:20

## 2020-01-01 RX ADMIN — Medication 2 TABLET: at 09:34

## 2020-01-01 RX ADMIN — ASPIRIN 81 MG: 81 TABLET, COATED ORAL at 09:34

## 2020-01-01 RX ADMIN — HYDROCODONE BITARTRATE AND ACETAMINOPHEN 1 TABLET: 5; 325 TABLET ORAL at 16:42

## 2020-01-01 RX ADMIN — BUDESONIDE AND FORMOTEROL FUMARATE DIHYDRATE 2 PUFF: 160; 4.5 AEROSOL RESPIRATORY (INHALATION) at 09:43

## 2020-01-01 RX ADMIN — ONDANSETRON 4 MG: 2 INJECTION INTRAMUSCULAR; INTRAVENOUS at 05:33

## 2020-01-01 RX ADMIN — ACETAMINOPHEN 325 MG: 325 TABLET ORAL at 08:49

## 2020-01-01 RX ADMIN — Medication 10 MG: at 08:27

## 2020-01-01 RX ADMIN — BUDESONIDE 500 MCG: 0.5 INHALANT RESPIRATORY (INHALATION) at 08:00

## 2020-01-01 RX ADMIN — Medication 10 ML: at 13:05

## 2020-01-01 RX ADMIN — INSULIN LISPRO 2 UNITS: 100 INJECTION, SOLUTION INTRAVENOUS; SUBCUTANEOUS at 08:53

## 2020-01-01 RX ADMIN — INSULIN LISPRO 2 UNITS: 100 INJECTION, SOLUTION INTRAVENOUS; SUBCUTANEOUS at 18:07

## 2020-01-01 RX ADMIN — ONDANSETRON 4 MG: 2 INJECTION INTRAMUSCULAR; INTRAVENOUS at 09:28

## 2020-01-01 RX ADMIN — Medication 10 ML: at 14:08

## 2020-01-01 RX ADMIN — IPRATROPIUM BROMIDE 0.5 MG: 0.5 SOLUTION RESPIRATORY (INHALATION) at 20:09

## 2020-01-01 RX ADMIN — PHENYLEPHRINE HYDROCHLORIDE 10 MCG/MIN: 10 INJECTION INTRAVENOUS at 00:54

## 2020-01-01 RX ADMIN — DULOXETINE 60 MG: 30 CAPSULE, DELAYED RELEASE ORAL at 10:06

## 2020-01-01 RX ADMIN — PANTOPRAZOLE SODIUM 40 MG: 40 TABLET, DELAYED RELEASE ORAL at 17:09

## 2020-01-01 RX ADMIN — DIGOXIN 0.12 MG: 125 TABLET ORAL at 08:21

## 2020-01-01 RX ADMIN — Medication 400 MG: at 22:20

## 2020-01-01 RX ADMIN — INSULIN LISPRO 3 UNITS: 100 INJECTION, SOLUTION INTRAVENOUS; SUBCUTANEOUS at 12:18

## 2020-01-01 RX ADMIN — DULOXETINE 60 MG: 30 CAPSULE, DELAYED RELEASE ORAL at 08:54

## 2020-01-01 RX ADMIN — PANTOPRAZOLE SODIUM 40 MG: 40 TABLET, DELAYED RELEASE ORAL at 06:16

## 2020-01-01 RX ADMIN — SODIUM CHLORIDE 40 MG: 9 INJECTION INTRAMUSCULAR; INTRAVENOUS; SUBCUTANEOUS at 21:52

## 2020-01-01 RX ADMIN — MONTELUKAST SODIUM 10 MG: 10 TABLET, FILM COATED ORAL at 11:32

## 2020-01-01 RX ADMIN — LOPERAMIDE HYDROCHLORIDE 4 MG: 2 CAPSULE ORAL at 08:49

## 2020-01-01 RX ADMIN — ACETAMINOPHEN 650 MG: 325 TABLET ORAL at 01:46

## 2020-01-01 RX ADMIN — BUDESONIDE 500 MCG: 0.5 INHALANT RESPIRATORY (INHALATION) at 08:06

## 2020-01-01 RX ADMIN — SODIUM CHLORIDE AND POTASSIUM CHLORIDE: 9; 1.49 INJECTION, SOLUTION INTRAVENOUS at 18:22

## 2020-01-01 RX ADMIN — POTASSIUM CHLORIDE 10 MEQ: 750 TABLET, FILM COATED, EXTENDED RELEASE ORAL at 10:14

## 2020-01-01 RX ADMIN — MONTELUKAST SODIUM 10 MG: 10 TABLET, FILM COATED ORAL at 09:20

## 2020-01-01 RX ADMIN — SODIUM CHLORIDE 2.5 MG/HR: 900 INJECTION, SOLUTION INTRAVENOUS at 14:56

## 2020-01-01 RX ADMIN — MIRTAZAPINE 15 MG: 15 TABLET, FILM COATED ORAL at 22:20

## 2020-01-01 RX ADMIN — MIRTAZAPINE 15 MG: 15 TABLET, FILM COATED ORAL at 22:26

## 2020-01-01 RX ADMIN — ARFORMOTEROL TARTRATE: 15 SOLUTION RESPIRATORY (INHALATION) at 08:54

## 2020-01-01 RX ADMIN — Medication 10 ML: at 17:55

## 2020-01-01 RX ADMIN — METOPROLOL TARTRATE 25 MG: 25 TABLET, FILM COATED ORAL at 21:26

## 2020-01-01 RX ADMIN — MORPHINE SULFATE 1 MG: 2 INJECTION, SOLUTION INTRAMUSCULAR; INTRAVENOUS at 02:26

## 2020-01-01 RX ADMIN — ALOGLIPTIN 25 MG: 25 TABLET, FILM COATED ORAL at 08:12

## 2020-01-01 RX ADMIN — HYDROCORTISONE SODIUM SUCCINATE 25 MG: 100 INJECTION, POWDER, FOR SOLUTION INTRAMUSCULAR; INTRAVENOUS at 06:14

## 2020-01-01 RX ADMIN — Medication 10 ML: at 13:17

## 2020-01-01 RX ADMIN — APIXABAN 5 MG: 5 TABLET, FILM COATED ORAL at 18:16

## 2020-01-01 RX ADMIN — DULOXETINE HYDROCHLORIDE 60 MG: 30 CAPSULE, DELAYED RELEASE ORAL at 08:23

## 2020-01-01 RX ADMIN — DULOXETINE 60 MG: 30 CAPSULE, DELAYED RELEASE ORAL at 09:18

## 2020-01-01 RX ADMIN — Medication 10 ML: at 06:17

## 2020-01-01 RX ADMIN — MIRTAZAPINE 15 MG: 15 TABLET, FILM COATED ORAL at 23:09

## 2020-01-01 RX ADMIN — INSULIN LISPRO 3 UNITS: 100 INJECTION, SOLUTION INTRAVENOUS; SUBCUTANEOUS at 11:31

## 2020-01-01 RX ADMIN — INSULIN LISPRO 4 UNITS: 100 INJECTION, SOLUTION INTRAVENOUS; SUBCUTANEOUS at 21:28

## 2020-01-01 RX ADMIN — BUDESONIDE 500 MCG: 0.5 INHALANT RESPIRATORY (INHALATION) at 07:43

## 2020-01-01 RX ADMIN — Medication 400 MG: at 23:09

## 2020-01-01 RX ADMIN — Medication 10 ML: at 06:09

## 2020-01-01 RX ADMIN — PANTOPRAZOLE SODIUM 40 MG: 40 TABLET, DELAYED RELEASE ORAL at 17:41

## 2020-01-01 RX ADMIN — PROCHLORPERAZINE EDISYLATE 5 MG: 5 INJECTION INTRAMUSCULAR; INTRAVENOUS at 12:56

## 2020-01-01 RX ADMIN — VANCOMYCIN HYDROCHLORIDE 2000 MG: 10 INJECTION, POWDER, LYOPHILIZED, FOR SOLUTION INTRAVENOUS at 21:04

## 2020-01-01 RX ADMIN — PREDNISONE 10 MG: 5 TABLET ORAL at 08:54

## 2020-01-01 RX ADMIN — ARFORMOTEROL TARTRATE 15 MCG: 15 SOLUTION RESPIRATORY (INHALATION) at 20:09

## 2020-01-01 RX ADMIN — METOPROLOL TARTRATE 12.5 MG: 25 TABLET, FILM COATED ORAL at 09:26

## 2020-01-01 RX ADMIN — IPRATROPIUM BROMIDE 0.5 MG: 0.5 SOLUTION RESPIRATORY (INHALATION) at 08:00

## 2020-01-01 RX ADMIN — DULOXETINE HYDROCHLORIDE 60 MG: 30 CAPSULE, DELAYED RELEASE ORAL at 09:46

## 2020-01-01 RX ADMIN — Medication 400 MG: at 21:34

## 2020-01-01 RX ADMIN — POTASSIUM CHLORIDE 10 MEQ: 750 TABLET, FILM COATED, EXTENDED RELEASE ORAL at 08:13

## 2020-01-01 RX ADMIN — METOPROLOL TARTRATE 12.5 MG: 25 TABLET, FILM COATED ORAL at 19:34

## 2020-01-01 RX ADMIN — Medication 10 ML: at 18:13

## 2020-01-01 RX ADMIN — OXYCODONE 5 MG: 5 TABLET ORAL at 12:56

## 2020-01-01 RX ADMIN — ARFORMOTEROL TARTRATE 15 MCG: 15 SOLUTION RESPIRATORY (INHALATION) at 08:06

## 2020-01-01 RX ADMIN — MONTELUKAST SODIUM 10 MG: 10 TABLET, FILM COATED ORAL at 10:14

## 2020-01-01 RX ADMIN — ARFORMOTEROL TARTRATE 15 MCG: 15 SOLUTION RESPIRATORY (INHALATION) at 10:07

## 2020-01-01 RX ADMIN — INSULIN LISPRO 2 UNITS: 100 INJECTION, SOLUTION INTRAVENOUS; SUBCUTANEOUS at 12:41

## 2020-01-01 RX ADMIN — HYDROCODONE BITARTRATE AND ACETAMINOPHEN 1 TABLET: 5; 325 TABLET ORAL at 08:36

## 2020-01-01 RX ADMIN — ACETAMINOPHEN 325 MG: 325 TABLET ORAL at 20:19

## 2020-01-01 RX ADMIN — HYDROCORTISONE SODIUM SUCCINATE 25 MG: 100 INJECTION, POWDER, FOR SOLUTION INTRAMUSCULAR; INTRAVENOUS at 06:19

## 2020-01-01 RX ADMIN — MIRTAZAPINE 15 MG: 15 TABLET, FILM COATED ORAL at 22:17

## 2020-01-01 RX ADMIN — OXYCODONE 5 MG: 5 TABLET ORAL at 23:02

## 2020-01-01 RX ADMIN — PANTOPRAZOLE SODIUM 40 MG: 40 TABLET, DELAYED RELEASE ORAL at 17:00

## 2020-01-01 RX ADMIN — HYDROCODONE BITARTRATE AND ACETAMINOPHEN 1 TABLET: 5; 325 TABLET ORAL at 21:54

## 2020-01-01 RX ADMIN — DIGOXIN 0.12 MG: 125 TABLET ORAL at 08:52

## 2020-01-01 RX ADMIN — PANTOPRAZOLE SODIUM 40 MG: 40 TABLET, DELAYED RELEASE ORAL at 06:52

## 2020-01-01 RX ADMIN — HYDROCODONE BITARTRATE AND ACETAMINOPHEN 1 TABLET: 5; 325 TABLET ORAL at 06:24

## 2020-01-01 RX ADMIN — POTASSIUM CHLORIDE 10 MEQ: 750 TABLET, FILM COATED, EXTENDED RELEASE ORAL at 19:04

## 2020-01-01 RX ADMIN — CEFTRIAXONE 2 G: 2 INJECTION, POWDER, FOR SOLUTION INTRAMUSCULAR; INTRAVENOUS at 20:20

## 2020-01-01 RX ADMIN — WATER 2 G: 1 INJECTION INTRAMUSCULAR; INTRAVENOUS; SUBCUTANEOUS at 09:16

## 2020-01-01 RX ADMIN — COLCHICINE 0.6 MG: 0.6 TABLET, FILM COATED ORAL at 08:43

## 2020-01-01 RX ADMIN — DAPTOMYCIN 700 MG: 500 INJECTION, POWDER, LYOPHILIZED, FOR SOLUTION INTRAVENOUS at 14:37

## 2020-01-01 RX ADMIN — ARFORMOTEROL TARTRATE 15 MCG: 15 SOLUTION RESPIRATORY (INHALATION) at 20:19

## 2020-01-01 RX ADMIN — ACETAMINOPHEN 325 MG: 325 TABLET ORAL at 09:02

## 2020-01-01 RX ADMIN — SODIUM CHLORIDE 75 ML/HR: 900 INJECTION, SOLUTION INTRAVENOUS at 22:41

## 2020-01-01 RX ADMIN — DULOXETINE HYDROCHLORIDE 60 MG: 30 CAPSULE, DELAYED RELEASE ORAL at 08:41

## 2020-01-01 RX ADMIN — Medication 10 ML: at 22:46

## 2020-01-01 RX ADMIN — PANTOPRAZOLE SODIUM 40 MG: 40 TABLET, DELAYED RELEASE ORAL at 05:44

## 2020-01-01 RX ADMIN — INSULIN LISPRO 4 UNITS: 100 INJECTION, SOLUTION INTRAVENOUS; SUBCUTANEOUS at 21:06

## 2020-01-01 RX ADMIN — SODIUM CHLORIDE AND POTASSIUM CHLORIDE: 9; 1.49 INJECTION, SOLUTION INTRAVENOUS at 22:06

## 2020-01-01 RX ADMIN — ARFORMOTEROL TARTRATE 15 MCG: 15 SOLUTION RESPIRATORY (INHALATION) at 20:22

## 2020-01-01 RX ADMIN — CEFEPIME HYDROCHLORIDE 2 G: 2 INJECTION, POWDER, FOR SOLUTION INTRAVENOUS at 12:56

## 2020-01-01 RX ADMIN — BUDESONIDE 500 MCG: 0.5 INHALANT RESPIRATORY (INHALATION) at 08:07

## 2020-01-01 RX ADMIN — IPRATROPIUM BROMIDE 0.5 MG: 0.5 SOLUTION RESPIRATORY (INHALATION) at 20:04

## 2020-01-01 RX ADMIN — HYDROCORTISONE SODIUM SUCCINATE 25 MG: 100 INJECTION, POWDER, FOR SOLUTION INTRAMUSCULAR; INTRAVENOUS at 13:44

## 2020-01-01 RX ADMIN — BUPIVACAINE HYDROCHLORIDE 5 MG: 2.5 INJECTION, SOLUTION EPIDURAL; INFILTRATION; INTRACAUDAL at 08:25

## 2020-01-01 RX ADMIN — ARFORMOTEROL TARTRATE: 15 SOLUTION RESPIRATORY (INHALATION) at 08:36

## 2020-01-01 RX ADMIN — PHENYLEPHRINE HYDROCHLORIDE 15 MCG/MIN: 10 INJECTION INTRAVENOUS at 20:09

## 2020-01-01 RX ADMIN — IPRATROPIUM BROMIDE 0.5 MG: 0.5 SOLUTION RESPIRATORY (INHALATION) at 13:13

## 2020-01-01 RX ADMIN — SODIUM CHLORIDE 40 MG: 9 INJECTION INTRAMUSCULAR; INTRAVENOUS; SUBCUTANEOUS at 20:59

## 2020-01-01 RX ADMIN — PROPOFOL 100 MG: 10 INJECTION, EMULSION INTRAVENOUS at 07:52

## 2020-01-01 RX ADMIN — POLYETHYLENE GLYCOL 3350 17 G: 17 POWDER, FOR SOLUTION ORAL at 17:53

## 2020-01-01 RX ADMIN — LIDOCAINE HYDROCHLORIDE 40 MG: 20 INJECTION, SOLUTION EPIDURAL; INFILTRATION; INTRACAUDAL; PERINEURAL at 07:52

## 2020-01-01 RX ADMIN — DULOXETINE 60 MG: 30 CAPSULE, DELAYED RELEASE ORAL at 08:25

## 2020-01-01 RX ADMIN — Medication 10 ML: at 07:05

## 2020-01-01 RX ADMIN — HYDROCODONE BITARTRATE AND ACETAMINOPHEN 1 TABLET: 5; 325 TABLET ORAL at 06:17

## 2020-01-01 RX ADMIN — METOPROLOL TARTRATE 12.5 MG: 25 TABLET, FILM COATED ORAL at 02:06

## 2020-01-01 RX ADMIN — METOPROLOL TARTRATE 12.5 MG: 25 TABLET, FILM COATED ORAL at 08:25

## 2020-01-01 RX ADMIN — DIGOXIN 0.12 MG: 125 TABLET ORAL at 08:58

## 2020-01-01 RX ADMIN — Medication 10 ML: at 21:00

## 2020-01-01 RX ADMIN — POTASSIUM CHLORIDE 10 MEQ: 750 TABLET, FILM COATED, EXTENDED RELEASE ORAL at 09:16

## 2020-01-01 RX ADMIN — ASPIRIN 81 MG: 81 TABLET, COATED ORAL at 08:40

## 2020-01-01 RX ADMIN — PANTOPRAZOLE SODIUM 40 MG: 40 TABLET, DELAYED RELEASE ORAL at 16:42

## 2020-01-01 RX ADMIN — Medication 5 ML: at 22:00

## 2020-01-01 RX ADMIN — POTASSIUM CHLORIDE 10 MEQ: 750 TABLET, FILM COATED, EXTENDED RELEASE ORAL at 17:26

## 2020-01-01 RX ADMIN — HYDROCODONE BITARTRATE AND ACETAMINOPHEN 1 TABLET: 5; 325 TABLET ORAL at 19:05

## 2020-01-01 RX ADMIN — MONTELUKAST SODIUM 10 MG: 10 TABLET, FILM COATED ORAL at 17:05

## 2020-01-01 RX ADMIN — Medication 10 ML: at 21:52

## 2020-01-01 RX ADMIN — CEPHALEXIN 500 MG: 250 CAPSULE ORAL at 17:10

## 2020-01-01 RX ADMIN — ONDANSETRON 4 MG: 2 INJECTION INTRAMUSCULAR; INTRAVENOUS at 08:59

## 2020-01-01 RX ADMIN — BUDESONIDE 500 MCG: 0.5 INHALANT RESPIRATORY (INHALATION) at 10:07

## 2020-01-01 RX ADMIN — HYDROCODONE BITARTRATE AND ACETAMINOPHEN 1 TABLET: 5; 325 TABLET ORAL at 12:21

## 2020-01-01 RX ADMIN — Medication 400 MG: at 00:33

## 2020-01-01 RX ADMIN — Medication 10 ML: at 22:00

## 2020-01-01 RX ADMIN — MONTELUKAST SODIUM 10 MG: 10 TABLET, FILM COATED ORAL at 08:52

## 2020-01-01 RX ADMIN — Medication 10 ML: at 13:18

## 2020-01-01 RX ADMIN — IPRATROPIUM BROMIDE 0.5 MG: 0.5 SOLUTION RESPIRATORY (INHALATION) at 15:24

## 2020-01-01 RX ADMIN — ARFORMOTEROL TARTRATE: 15 SOLUTION RESPIRATORY (INHALATION) at 19:25

## 2020-01-01 RX ADMIN — ASPIRIN 81 MG: 81 TABLET, COATED ORAL at 08:23

## 2020-01-01 RX ADMIN — MIDAZOLAM HYDROCHLORIDE 1 MG: 1 INJECTION, SOLUTION INTRAMUSCULAR; INTRAVENOUS at 07:43

## 2020-01-01 RX ADMIN — CEFEPIME 2 G: 2 INJECTION, POWDER, FOR SOLUTION INTRAVENOUS at 23:09

## 2020-01-01 RX ADMIN — INSULIN LISPRO 3 UNITS: 100 INJECTION, SOLUTION INTRAVENOUS; SUBCUTANEOUS at 22:53

## 2020-01-01 RX ADMIN — METOPROLOL TARTRATE 12.5 MG: 25 TABLET, FILM COATED ORAL at 13:57

## 2020-01-01 RX ADMIN — ASPIRIN 81 MG: 81 TABLET, COATED ORAL at 08:41

## 2020-01-01 RX ADMIN — DIGOXIN 0.12 MG: 125 TABLET ORAL at 11:25

## 2020-01-01 RX ADMIN — Medication 10 ML: at 07:02

## 2020-01-01 RX ADMIN — Medication 10 ML: at 15:01

## 2020-01-01 RX ADMIN — FERROUS SULFATE TAB 325 MG (65 MG ELEMENTAL FE) 325 MG: 325 (65 FE) TAB at 07:53

## 2020-01-01 RX ADMIN — ACETAMINOPHEN 325 MG: 325 TABLET ORAL at 07:50

## 2020-01-01 RX ADMIN — Medication 10 MG: at 08:20

## 2020-01-01 RX ADMIN — Medication 10 ML: at 05:07

## 2020-01-01 RX ADMIN — Medication 2 TABLET: at 08:48

## 2020-01-01 RX ADMIN — INSULIN LISPRO 2 UNITS: 100 INJECTION, SOLUTION INTRAVENOUS; SUBCUTANEOUS at 07:30

## 2020-01-01 RX ADMIN — INSULIN LISPRO 2 UNITS: 100 INJECTION, SOLUTION INTRAVENOUS; SUBCUTANEOUS at 10:06

## 2020-01-01 RX ADMIN — DIGOXIN 0.12 MG: 125 TABLET ORAL at 08:12

## 2020-01-01 RX ADMIN — DULOXETINE HYDROCHLORIDE 60 MG: 30 CAPSULE, DELAYED RELEASE ORAL at 08:59

## 2020-01-01 RX ADMIN — POTASSIUM CHLORIDE 10 MEQ: 750 TABLET, FILM COATED, EXTENDED RELEASE ORAL at 17:01

## 2020-01-01 RX ADMIN — FERROUS SULFATE TAB 325 MG (65 MG ELEMENTAL FE) 325 MG: 325 (65 FE) TAB at 08:52

## 2020-01-01 RX ADMIN — Medication 10 ML: at 23:08

## 2020-01-01 RX ADMIN — TRAMADOL HYDROCHLORIDE 50 MG: 50 TABLET, FILM COATED ORAL at 04:18

## 2020-01-01 RX ADMIN — ARFORMOTEROL TARTRATE 15 MCG: 15 SOLUTION RESPIRATORY (INHALATION) at 21:30

## 2020-01-01 RX ADMIN — FENTANYL CITRATE 50 MCG: 50 INJECTION, SOLUTION INTRAMUSCULAR; INTRAVENOUS at 08:06

## 2020-01-01 RX ADMIN — MIRTAZAPINE 15 MG: 15 TABLET, FILM COATED ORAL at 21:50

## 2020-01-01 RX ADMIN — IPRATROPIUM BROMIDE 0.5 MG: 0.5 SOLUTION RESPIRATORY (INHALATION) at 11:35

## 2020-01-01 RX ADMIN — Medication 10 ML: at 13:02

## 2020-01-01 RX ADMIN — MONTELUKAST SODIUM 10 MG: 10 TABLET, FILM COATED ORAL at 08:11

## 2020-01-01 RX ADMIN — PANTOPRAZOLE SODIUM 40 MG: 40 TABLET, DELAYED RELEASE ORAL at 17:53

## 2020-01-01 RX ADMIN — OXYCODONE 5 MG: 5 TABLET ORAL at 08:38

## 2020-01-01 RX ADMIN — IPRATROPIUM BROMIDE 0.5 MG: 0.5 SOLUTION RESPIRATORY (INHALATION) at 07:18

## 2020-01-01 RX ADMIN — BUDESONIDE 500 MCG: 0.5 INHALANT RESPIRATORY (INHALATION) at 19:17

## 2020-01-01 RX ADMIN — ARFORMOTEROL TARTRATE: 15 SOLUTION RESPIRATORY (INHALATION) at 20:03

## 2020-01-01 RX ADMIN — APIXABAN 5 MG: 5 TABLET, FILM COATED ORAL at 08:47

## 2020-01-01 RX ADMIN — POTASSIUM CHLORIDE 10 MEQ: 750 TABLET, FILM COATED, EXTENDED RELEASE ORAL at 08:12

## 2020-01-01 RX ADMIN — Medication 10 ML: at 22:38

## 2020-01-01 RX ADMIN — INSULIN LISPRO 2 UNITS: 100 INJECTION, SOLUTION INTRAVENOUS; SUBCUTANEOUS at 08:12

## 2020-01-01 RX ADMIN — MONTELUKAST SODIUM 10 MG: 10 TABLET, FILM COATED ORAL at 08:30

## 2020-01-01 RX ADMIN — PANTOPRAZOLE SODIUM 40 MG: 40 TABLET, DELAYED RELEASE ORAL at 06:37

## 2020-01-01 RX ADMIN — INSULIN LISPRO 2 UNITS: 100 INJECTION, SOLUTION INTRAVENOUS; SUBCUTANEOUS at 11:30

## 2020-01-01 RX ADMIN — DULOXETINE 60 MG: 30 CAPSULE, DELAYED RELEASE ORAL at 09:19

## 2020-01-01 RX ADMIN — DIGOXIN 250 MCG: 0.25 INJECTION INTRAMUSCULAR; INTRAVENOUS at 19:01

## 2020-01-01 RX ADMIN — Medication 10 ML: at 15:27

## 2020-01-01 RX ADMIN — ASPIRIN 81 MG: 81 TABLET, COATED ORAL at 08:49

## 2020-01-01 RX ADMIN — Medication 10 ML: at 13:03

## 2020-01-01 RX ADMIN — Medication 10 ML: at 17:56

## 2020-01-01 RX ADMIN — PREDNISONE 10 MG: 5 TABLET ORAL at 16:42

## 2020-01-01 RX ADMIN — METOPROLOL TARTRATE 25 MG: 25 TABLET, FILM COATED ORAL at 23:22

## 2020-01-01 RX ADMIN — IPRATROPIUM BROMIDE 0.5 MG: 0.5 SOLUTION RESPIRATORY (INHALATION) at 13:33

## 2020-01-01 RX ADMIN — POTASSIUM CHLORIDE 10 MEQ: 750 TABLET, FILM COATED, EXTENDED RELEASE ORAL at 08:40

## 2020-01-01 RX ADMIN — HYDROCORTISONE SODIUM SUCCINATE 100 MG: 100 INJECTION, POWDER, FOR SOLUTION INTRAMUSCULAR; INTRAVENOUS at 21:29

## 2020-01-01 RX ADMIN — POTASSIUM CHLORIDE 10 MEQ: 750 TABLET, FILM COATED, EXTENDED RELEASE ORAL at 08:23

## 2020-01-01 RX ADMIN — HYDROCODONE BITARTRATE AND ACETAMINOPHEN 1 TABLET: 5; 325 TABLET ORAL at 13:02

## 2020-01-01 RX ADMIN — MORPHINE SULFATE 1 MG: 2 INJECTION, SOLUTION INTRAMUSCULAR; INTRAVENOUS at 02:05

## 2020-01-01 RX ADMIN — PANTOPRAZOLE SODIUM 40 MG: 40 TABLET, DELAYED RELEASE ORAL at 08:49

## 2020-01-01 RX ADMIN — SODIUM CHLORIDE 125 ML/HR: 900 INJECTION, SOLUTION INTRAVENOUS at 20:16

## 2020-01-01 RX ADMIN — MIRTAZAPINE 15 MG: 15 TABLET, FILM COATED ORAL at 00:33

## 2020-01-01 RX ADMIN — PANTOPRAZOLE SODIUM 40 MG: 40 TABLET, DELAYED RELEASE ORAL at 07:04

## 2020-01-01 RX ADMIN — PREDNISONE 10 MG: 5 TABLET ORAL at 08:12

## 2020-01-01 RX ADMIN — ARFORMOTEROL TARTRATE 15 MCG: 15 SOLUTION RESPIRATORY (INHALATION) at 07:32

## 2020-01-01 RX ADMIN — FERROUS SULFATE TAB 325 MG (65 MG ELEMENTAL FE) 325 MG: 325 (65 FE) TAB at 08:11

## 2020-01-01 RX ADMIN — APIXABAN 5 MG: 5 TABLET, FILM COATED ORAL at 08:40

## 2020-01-01 RX ADMIN — IPRATROPIUM BROMIDE 0.5 MG: 0.5 SOLUTION RESPIRATORY (INHALATION) at 21:25

## 2020-01-01 RX ADMIN — HYDROCORTISONE SODIUM SUCCINATE 50 MG: 100 INJECTION, POWDER, FOR SOLUTION INTRAMUSCULAR; INTRAVENOUS at 22:53

## 2020-01-01 RX ADMIN — PANTOPRAZOLE SODIUM 40 MG: 40 TABLET, DELAYED RELEASE ORAL at 16:46

## 2020-01-01 RX ADMIN — ONDANSETRON 4 MG: 2 INJECTION INTRAMUSCULAR; INTRAVENOUS at 14:09

## 2020-01-01 RX ADMIN — ARFORMOTEROL TARTRATE 15 MCG: 15 SOLUTION RESPIRATORY (INHALATION) at 07:48

## 2020-01-01 RX ADMIN — LIDOCAINE HYDROCHLORIDE 5 ML: 10 INJECTION, SOLUTION INFILTRATION; PERINEURAL at 08:23

## 2020-01-01 RX ADMIN — SODIUM CHLORIDE AND POTASSIUM CHLORIDE: 9; 1.49 INJECTION, SOLUTION INTRAVENOUS at 05:38

## 2020-01-01 RX ADMIN — LOPERAMIDE HYDROCHLORIDE 4 MG: 2 CAPSULE ORAL at 17:00

## 2020-01-01 RX ADMIN — IPRATROPIUM BROMIDE 0.5 MG: 0.5 SOLUTION RESPIRATORY (INHALATION) at 07:25

## 2020-01-01 RX ADMIN — IPRATROPIUM BROMIDE 0.5 MG: 0.5 SOLUTION RESPIRATORY (INHALATION) at 23:57

## 2020-01-01 RX ADMIN — SODIUM CHLORIDE 40 MG: 9 INJECTION INTRAMUSCULAR; INTRAVENOUS; SUBCUTANEOUS at 10:14

## 2020-01-01 RX ADMIN — HYDROCODONE BITARTRATE AND ACETAMINOPHEN 1 TABLET: 5; 325 TABLET ORAL at 11:32

## 2020-01-01 RX ADMIN — FERROUS SULFATE TAB 325 MG (65 MG ELEMENTAL FE) 325 MG: 325 (65 FE) TAB at 08:58

## 2020-01-01 RX ADMIN — HYDROCORTISONE SODIUM SUCCINATE 25 MG: 100 INJECTION, POWDER, FOR SOLUTION INTRAMUSCULAR; INTRAVENOUS at 20:53

## 2020-01-01 RX ADMIN — HYDROCORTISONE SODIUM SUCCINATE 100 MG: 100 INJECTION, POWDER, FOR SOLUTION INTRAMUSCULAR; INTRAVENOUS at 05:47

## 2020-01-01 RX ADMIN — Medication 10 ML: at 21:30

## 2020-01-01 RX ADMIN — INSULIN LISPRO 5 UNITS: 100 INJECTION, SOLUTION INTRAVENOUS; SUBCUTANEOUS at 11:29

## 2020-01-01 RX ADMIN — OXYCODONE 5 MG: 5 TABLET ORAL at 19:04

## 2020-01-01 RX ADMIN — INSULIN LISPRO 3 UNITS: 100 INJECTION, SOLUTION INTRAVENOUS; SUBCUTANEOUS at 13:02

## 2020-01-01 RX ADMIN — FUROSEMIDE 20 MG: 20 TABLET ORAL at 08:12

## 2020-01-01 RX ADMIN — MIRTAZAPINE 15 MG: 15 TABLET, FILM COATED ORAL at 21:30

## 2020-01-01 RX ADMIN — ARFORMOTEROL TARTRATE 15 MCG: 15 SOLUTION RESPIRATORY (INHALATION) at 19:57

## 2020-01-01 RX ADMIN — IPRATROPIUM BROMIDE 0.5 MG: 0.5 SOLUTION RESPIRATORY (INHALATION) at 19:16

## 2020-01-01 RX ADMIN — PREDNISONE 10 MG: 5 TABLET ORAL at 00:33

## 2020-01-01 RX ADMIN — ASPIRIN 81 MG: 81 TABLET, COATED ORAL at 08:00

## 2020-01-01 RX ADMIN — HYDROCORTISONE SODIUM SUCCINATE 25 MG: 100 INJECTION, POWDER, FOR SOLUTION INTRAMUSCULAR; INTRAVENOUS at 20:46

## 2020-01-01 RX ADMIN — Medication 10 ML: at 22:27

## 2020-01-01 RX ADMIN — MIRTAZAPINE 15 MG: 15 TABLET, FILM COATED ORAL at 22:25

## 2020-01-01 RX ADMIN — PANTOPRAZOLE SODIUM 40 MG: 40 TABLET, DELAYED RELEASE ORAL at 16:40

## 2020-01-01 RX ADMIN — SODIUM CHLORIDE 2.5 MG/HR: 900 INJECTION, SOLUTION INTRAVENOUS at 22:48

## 2020-01-01 RX ADMIN — DAPTOMYCIN 700 MG: 500 INJECTION, POWDER, LYOPHILIZED, FOR SOLUTION INTRAVENOUS at 22:50

## 2020-01-01 RX ADMIN — Medication 2 TABLET: at 09:46

## 2020-01-01 RX ADMIN — Medication 1 CAPSULE: at 08:47

## 2020-01-01 RX ADMIN — PREDNISONE 20 MG: 20 TABLET ORAL at 08:23

## 2020-01-01 RX ADMIN — Medication 10 ML: at 05:04

## 2020-01-01 RX ADMIN — ARFORMOTEROL TARTRATE 15 MCG: 15 SOLUTION RESPIRATORY (INHALATION) at 20:05

## 2020-01-01 RX ADMIN — ARFORMOTEROL TARTRATE 15 MCG: 15 SOLUTION RESPIRATORY (INHALATION) at 20:12

## 2020-01-01 RX ADMIN — BUDESONIDE 500 MCG: 0.5 INHALANT RESPIRATORY (INHALATION) at 20:22

## 2020-01-01 RX ADMIN — METOPROLOL TARTRATE 12.5 MG: 25 TABLET, FILM COATED ORAL at 08:01

## 2020-01-01 RX ADMIN — FERROUS SULFATE TAB 325 MG (65 MG ELEMENTAL FE) 325 MG: 325 (65 FE) TAB at 09:47

## 2020-01-01 RX ADMIN — DULOXETINE 60 MG: 30 CAPSULE, DELAYED RELEASE ORAL at 08:40

## 2020-01-01 RX ADMIN — Medication 10 ML: at 20:53

## 2020-01-01 RX ADMIN — IPRATROPIUM BROMIDE 0.5 MG: 0.5 SOLUTION RESPIRATORY (INHALATION) at 21:36

## 2020-01-01 RX ADMIN — WATER 2 G: 1 INJECTION INTRAMUSCULAR; INTRAVENOUS; SUBCUTANEOUS at 02:30

## 2020-01-01 RX ADMIN — METOPROLOL TARTRATE 25 MG: 25 TABLET, FILM COATED ORAL at 07:04

## 2020-01-01 RX ADMIN — MIRTAZAPINE 15 MG: 15 TABLET, FILM COATED ORAL at 21:00

## 2020-01-01 RX ADMIN — ONDANSETRON 4 MG: 2 INJECTION INTRAMUSCULAR; INTRAVENOUS at 21:00

## 2020-01-01 RX ADMIN — MIRTAZAPINE 15 MG: 15 TABLET, FILM COATED ORAL at 22:46

## 2020-01-01 RX ADMIN — ARFORMOTEROL TARTRATE 15 MCG: 15 SOLUTION RESPIRATORY (INHALATION) at 21:36

## 2020-01-01 RX ADMIN — MONTELUKAST SODIUM 10 MG: 10 TABLET, FILM COATED ORAL at 08:26

## 2020-01-01 RX ADMIN — PIPERACILLIN AND TAZOBACTAM 3.38 G: 3; .375 INJECTION, POWDER, LYOPHILIZED, FOR SOLUTION INTRAVENOUS at 16:40

## 2020-01-01 RX ADMIN — INSULIN LISPRO 2 UNITS: 100 INJECTION, SOLUTION INTRAVENOUS; SUBCUTANEOUS at 21:51

## 2020-01-01 RX ADMIN — SODIUM CHLORIDE 75 ML/HR: 900 INJECTION, SOLUTION INTRAVENOUS at 07:23

## 2020-01-01 RX ADMIN — METOPROLOL TARTRATE 12.5 MG: 25 TABLET, FILM COATED ORAL at 01:41

## 2020-01-01 RX ADMIN — Medication 10 ML: at 16:36

## 2020-01-01 RX ADMIN — INSULIN LISPRO 5 UNITS: 100 INJECTION, SOLUTION INTRAVENOUS; SUBCUTANEOUS at 07:47

## 2020-01-01 RX ADMIN — Medication 10 ML: at 06:31

## 2020-01-01 RX ADMIN — DIGOXIN 0.12 MG: 125 TABLET ORAL at 10:06

## 2020-01-01 RX ADMIN — Medication 1 CAPSULE: at 08:49

## 2020-01-01 RX ADMIN — Medication 10 ML: at 15:34

## 2020-01-01 RX ADMIN — PREDNISONE 10 MG: 5 TABLET ORAL at 17:53

## 2020-01-01 RX ADMIN — Medication 100 MCG: at 08:27

## 2020-01-01 RX ADMIN — PANTOPRAZOLE SODIUM 40 MG: 40 TABLET, DELAYED RELEASE ORAL at 06:08

## 2020-01-01 RX ADMIN — HYDROCORTISONE SODIUM SUCCINATE 100 MG: 100 INJECTION, POWDER, FOR SOLUTION INTRAMUSCULAR; INTRAVENOUS at 14:08

## 2020-01-01 RX ADMIN — ASPIRIN 81 MG: 81 TABLET, COATED ORAL at 08:54

## 2020-01-01 RX ADMIN — SUCCINYLCHOLINE CHLORIDE 100 MG: 20 INJECTION INTRAMUSCULAR; INTRAVENOUS at 07:52

## 2020-01-01 RX ADMIN — METOPROLOL TARTRATE 12.5 MG: 25 TABLET, FILM COATED ORAL at 17:27

## 2020-01-01 RX ADMIN — SODIUM CHLORIDE 40 MG: 9 INJECTION INTRAMUSCULAR; INTRAVENOUS; SUBCUTANEOUS at 08:29

## 2020-01-01 RX ADMIN — MONTELUKAST SODIUM 10 MG: 10 TABLET, FILM COATED ORAL at 09:19

## 2020-01-01 RX ADMIN — Medication 10 ML: at 22:33

## 2020-01-01 RX ADMIN — PANTOPRAZOLE SODIUM 40 MG: 40 TABLET, DELAYED RELEASE ORAL at 07:47

## 2020-01-01 RX ADMIN — ARFORMOTEROL TARTRATE 15 MCG: 15 SOLUTION RESPIRATORY (INHALATION) at 07:49

## 2020-01-01 RX ADMIN — LOPERAMIDE HYDROCHLORIDE 4 MG: 2 CAPSULE ORAL at 08:20

## 2020-01-01 RX ADMIN — DULOXETINE HYDROCHLORIDE 60 MG: 30 CAPSULE, DELAYED RELEASE ORAL at 08:00

## 2020-01-01 RX ADMIN — ALOGLIPTIN 25 MG: 25 TABLET, FILM COATED ORAL at 15:56

## 2020-01-01 RX ADMIN — METOPROLOL TARTRATE 25 MG: 25 TABLET, FILM COATED ORAL at 22:46

## 2020-01-01 RX ADMIN — DIGOXIN 0.12 MG: 125 TABLET ORAL at 08:46

## 2020-01-01 RX ADMIN — ONDANSETRON 4 MG: 2 INJECTION INTRAMUSCULAR; INTRAVENOUS at 07:31

## 2020-01-01 RX ADMIN — METOPROLOL TARTRATE 25 MG: 25 TABLET, FILM COATED ORAL at 22:25

## 2020-01-01 RX ADMIN — DULOXETINE HYDROCHLORIDE 60 MG: 30 CAPSULE, DELAYED RELEASE ORAL at 08:47

## 2020-01-01 RX ADMIN — ARFORMOTEROL TARTRATE 15 MCG: 15 SOLUTION RESPIRATORY (INHALATION) at 08:00

## 2020-01-01 RX ADMIN — APIXABAN 5 MG: 5 TABLET, FILM COATED ORAL at 08:54

## 2020-01-01 RX ADMIN — Medication 1 CAPSULE: at 08:54

## 2020-01-01 RX ADMIN — CEPHALEXIN 500 MG: 250 CAPSULE ORAL at 09:59

## 2020-01-01 RX ADMIN — CEFEPIME HYDROCHLORIDE 2 G: 2 INJECTION, POWDER, FOR SOLUTION INTRAVENOUS at 01:03

## 2020-01-01 RX ADMIN — HYDROMORPHONE HYDROCHLORIDE 1 MG: 2 INJECTION, SOLUTION INTRAMUSCULAR; INTRAVENOUS; SUBCUTANEOUS at 23:42

## 2020-01-01 RX ADMIN — METOPROLOL TARTRATE 25 MG: 25 TABLET, FILM COATED ORAL at 11:32

## 2020-01-01 RX ADMIN — ONDANSETRON 4 MG: 2 INJECTION INTRAMUSCULAR; INTRAVENOUS at 01:00

## 2020-01-01 RX ADMIN — MIRTAZAPINE 15 MG: 15 TABLET, FILM COATED ORAL at 22:08

## 2020-01-01 RX ADMIN — BUDESONIDE 500 MCG: 0.5 INHALANT RESPIRATORY (INHALATION) at 07:19

## 2020-01-01 RX ADMIN — BUDESONIDE 500 MCG: 0.5 INHALANT RESPIRATORY (INHALATION) at 20:12

## 2020-01-01 RX ADMIN — MIRTAZAPINE 15 MG: 15 TABLET, FILM COATED ORAL at 21:26

## 2020-01-01 RX ADMIN — POTASSIUM CHLORIDE 10 MEQ: 750 TABLET, FILM COATED, EXTENDED RELEASE ORAL at 08:54

## 2020-01-01 RX ADMIN — DULOXETINE 60 MG: 30 CAPSULE, DELAYED RELEASE ORAL at 08:30

## 2020-01-01 RX ADMIN — Medication 10 ML: at 22:21

## 2020-01-01 RX ADMIN — MONTELUKAST SODIUM 10 MG: 10 TABLET, FILM COATED ORAL at 10:06

## 2020-01-01 RX ADMIN — Medication 2 TABLET: at 08:40

## 2020-01-01 RX ADMIN — DULOXETINE HYDROCHLORIDE 60 MG: 30 CAPSULE, DELAYED RELEASE ORAL at 08:21

## 2020-01-01 RX ADMIN — MORPHINE SULFATE 1 MG: 2 INJECTION, SOLUTION INTRAMUSCULAR; INTRAVENOUS at 12:33

## 2020-01-01 RX ADMIN — BUDESONIDE 500 MCG: 0.5 INHALANT RESPIRATORY (INHALATION) at 19:48

## 2020-01-01 RX ADMIN — POTASSIUM CHLORIDE 10 MEQ: 750 TABLET, FILM COATED, EXTENDED RELEASE ORAL at 08:01

## 2020-01-01 RX ADMIN — INSULIN LISPRO 5 UNITS: 100 INJECTION, SOLUTION INTRAVENOUS; SUBCUTANEOUS at 08:24

## 2020-01-01 RX ADMIN — FOSFOMYCIN TROMETHAMINE 3 G: 3 POWDER ORAL at 09:49

## 2020-01-01 RX ADMIN — ARFORMOTEROL TARTRATE 15 MCG: 15 SOLUTION RESPIRATORY (INHALATION) at 07:43

## 2020-01-01 RX ADMIN — PHENYLEPHRINE HYDROCHLORIDE 5 MCG/MIN: 10 INJECTION INTRAVENOUS at 00:23

## 2020-01-01 RX ADMIN — PANTOPRAZOLE SODIUM 40 MG: 40 TABLET, DELAYED RELEASE ORAL at 06:47

## 2020-01-01 RX ADMIN — METOPROLOL TARTRATE 25 MG: 25 TABLET, FILM COATED ORAL at 09:20

## 2020-01-01 RX ADMIN — INFLUENZA VIRUS VACCINE 0.5 ML: 15; 15; 15; 15 SUSPENSION INTRAMUSCULAR at 15:35

## 2020-01-01 RX ADMIN — INSULIN LISPRO 2 UNITS: 100 INJECTION, SOLUTION INTRAVENOUS; SUBCUTANEOUS at 16:42

## 2020-01-01 RX ADMIN — ASPIRIN 81 MG: 81 TABLET, COATED ORAL at 08:38

## 2020-01-01 RX ADMIN — HYDROCODONE BITARTRATE AND ACETAMINOPHEN 1 TABLET: 5; 325 TABLET ORAL at 22:46

## 2020-01-01 RX ADMIN — POTASSIUM CHLORIDE 10 MEQ: 750 TABLET, FILM COATED, EXTENDED RELEASE ORAL at 09:00

## 2020-01-01 RX ADMIN — FENTANYL CITRATE 50 MCG: 50 INJECTION, SOLUTION INTRAMUSCULAR; INTRAVENOUS at 07:43

## 2020-01-01 RX ADMIN — Medication 10 ML: at 22:42

## 2020-01-01 RX ADMIN — CYANOCOBALAMIN TAB 500 MCG 1000 MCG: 500 TAB at 09:46

## 2020-01-01 RX ADMIN — MONTELUKAST SODIUM 10 MG: 10 TABLET, FILM COATED ORAL at 08:12

## 2020-01-01 RX ADMIN — PANTOPRAZOLE SODIUM 40 MG: 40 TABLET, DELAYED RELEASE ORAL at 06:30

## 2020-01-01 RX ADMIN — SODIUM CHLORIDE AND POTASSIUM CHLORIDE: 9; 1.49 INJECTION, SOLUTION INTRAVENOUS at 10:26

## 2020-01-01 RX ADMIN — METOPROLOL TARTRATE 12.5 MG: 25 TABLET, FILM COATED ORAL at 21:18

## 2020-01-01 RX ADMIN — Medication 10 ML: at 06:15

## 2020-01-01 RX ADMIN — SODIUM CHLORIDE 40 MG: 9 INJECTION INTRAMUSCULAR; INTRAVENOUS; SUBCUTANEOUS at 08:12

## 2020-01-01 RX ADMIN — ONDANSETRON 4 MG: 2 INJECTION INTRAMUSCULAR; INTRAVENOUS at 02:45

## 2020-01-01 RX ADMIN — HYDROCODONE BITARTRATE AND ACETAMINOPHEN 1 TABLET: 5; 325 TABLET ORAL at 09:19

## 2020-01-01 RX ADMIN — PREDNISONE 20 MG: 20 TABLET ORAL at 17:08

## 2020-01-01 RX ADMIN — POTASSIUM CHLORIDE 10 MEQ: 750 TABLET, FILM COATED, EXTENDED RELEASE ORAL at 09:46

## 2020-01-01 RX ADMIN — SODIUM CHLORIDE 5 MG/HR: 900 INJECTION, SOLUTION INTRAVENOUS at 15:01

## 2020-01-01 RX ADMIN — Medication 10 ML: at 23:25

## 2020-01-01 RX ADMIN — DIGOXIN 0.12 MG: 125 TABLET ORAL at 08:26

## 2020-01-01 RX ADMIN — BUDESONIDE 500 MCG: 0.5 INHALANT RESPIRATORY (INHALATION) at 20:05

## 2020-01-01 RX ADMIN — ALOGLIPTIN 25 MG: 25 TABLET, FILM COATED ORAL at 08:11

## 2020-01-01 RX ADMIN — PHENYLEPHRINE HYDROCHLORIDE 35 MCG/MIN: 10 INJECTION INTRAVENOUS at 08:47

## 2020-01-01 RX ADMIN — APIXABAN 5 MG: 5 TABLET, FILM COATED ORAL at 17:08

## 2020-01-01 RX ADMIN — IPRATROPIUM BROMIDE 0.5 MG: 0.5 SOLUTION RESPIRATORY (INHALATION) at 15:08

## 2020-01-01 RX ADMIN — BUDESONIDE AND FORMOTEROL FUMARATE DIHYDRATE 2 PUFF: 160; 4.5 AEROSOL RESPIRATORY (INHALATION) at 21:34

## 2020-01-01 RX ADMIN — FEBUXOSTAT 40 MG: 40 TABLET, FILM COATED ORAL at 08:02

## 2020-01-01 RX ADMIN — Medication 10 ML: at 22:32

## 2020-01-01 RX ADMIN — APIXABAN 5 MG: 5 TABLET, FILM COATED ORAL at 08:13

## 2020-01-01 RX ADMIN — IPRATROPIUM BROMIDE 0.5 MG: 0.5 SOLUTION RESPIRATORY (INHALATION) at 07:31

## 2020-01-01 RX ADMIN — MIRTAZAPINE 15 MG: 15 TABLET, FILM COATED ORAL at 23:22

## 2020-01-01 RX ADMIN — HYDROCODONE BITARTRATE AND ACETAMINOPHEN 1 TABLET: 5; 325 TABLET ORAL at 07:10

## 2020-01-01 RX ADMIN — MONTELUKAST SODIUM 10 MG: 10 TABLET, FILM COATED ORAL at 17:52

## 2020-01-01 RX ADMIN — CYANOCOBALAMIN TAB 500 MCG 1000 MCG: 500 TAB at 09:19

## 2020-01-01 RX ADMIN — SODIUM CHLORIDE AND POTASSIUM CHLORIDE: 9; 1.49 INJECTION, SOLUTION INTRAVENOUS at 09:28

## 2020-01-01 RX ADMIN — INSULIN LISPRO 2 UNITS: 100 INJECTION, SOLUTION INTRAVENOUS; SUBCUTANEOUS at 17:01

## 2020-01-01 RX ADMIN — WATER 2 G: 1 INJECTION INTRAMUSCULAR; INTRAVENOUS; SUBCUTANEOUS at 18:26

## 2020-01-01 RX ADMIN — CEPHALEXIN 500 MG: 250 CAPSULE ORAL at 08:12

## 2020-01-01 RX ADMIN — ARFORMOTEROL TARTRATE 15 MCG: 15 SOLUTION RESPIRATORY (INHALATION) at 20:30

## 2020-01-01 RX ADMIN — COLCHICINE 0.6 MG: 0.6 TABLET, FILM COATED ORAL at 17:40

## 2020-01-01 RX ADMIN — METOPROLOL TARTRATE 12.5 MG: 25 TABLET, FILM COATED ORAL at 14:07

## 2020-01-01 RX ADMIN — POTASSIUM CHLORIDE 10 MEQ: 750 TABLET, FILM COATED, EXTENDED RELEASE ORAL at 08:58

## 2020-01-01 RX ADMIN — OXYCODONE 5 MG: 5 TABLET ORAL at 12:27

## 2020-01-01 RX ADMIN — INSULIN LISPRO 2 UNITS: 100 INJECTION, SOLUTION INTRAVENOUS; SUBCUTANEOUS at 08:14

## 2020-01-01 RX ADMIN — CYANOCOBALAMIN TAB 500 MCG 1000 MCG: 500 TAB at 10:06

## 2020-01-01 RX ADMIN — INSULIN LISPRO 2 UNITS: 100 INJECTION, SOLUTION INTRAVENOUS; SUBCUTANEOUS at 08:20

## 2020-01-01 RX ADMIN — TRAMADOL HYDROCHLORIDE 50 MG: 50 TABLET, FILM COATED ORAL at 22:06

## 2020-01-01 RX ADMIN — INSULIN LISPRO 5 UNITS: 100 INJECTION, SOLUTION INTRAVENOUS; SUBCUTANEOUS at 13:18

## 2020-01-01 RX ADMIN — METOPROLOL TARTRATE 25 MG: 25 TABLET, FILM COATED ORAL at 20:46

## 2020-01-01 RX ADMIN — DULOXETINE HYDROCHLORIDE 60 MG: 30 CAPSULE, DELAYED RELEASE ORAL at 08:13

## 2020-01-01 RX ADMIN — PREDNISONE 10 MG: 5 TABLET ORAL at 08:20

## 2020-01-01 RX ADMIN — WATER 2 G: 1 INJECTION INTRAMUSCULAR; INTRAVENOUS; SUBCUTANEOUS at 09:00

## 2020-01-01 RX ADMIN — FERROUS SULFATE TAB 325 MG (65 MG ELEMENTAL FE) 325 MG: 325 (65 FE) TAB at 08:12

## 2020-01-01 RX ADMIN — MIRTAZAPINE 15 MG: 15 TABLET, FILM COATED ORAL at 21:34

## 2020-01-01 RX ADMIN — SODIUM CHLORIDE 40 MG: 9 INJECTION INTRAMUSCULAR; INTRAVENOUS; SUBCUTANEOUS at 09:00

## 2020-01-01 RX ADMIN — Medication 1500 MG: at 15:39

## 2020-01-01 RX ADMIN — METOPROLOL TARTRATE 12.5 MG: 25 TABLET, FILM COATED ORAL at 19:01

## 2020-01-01 RX ADMIN — PANTOPRAZOLE SODIUM 40 MG: 40 TABLET, DELAYED RELEASE ORAL at 06:19

## 2020-01-01 RX ADMIN — PREDNISONE 20 MG: 20 TABLET ORAL at 08:42

## 2020-01-01 RX ADMIN — CEFTRIAXONE 2 G: 2 INJECTION, POWDER, FOR SOLUTION INTRAMUSCULAR; INTRAVENOUS at 21:29

## 2020-01-01 RX ADMIN — Medication: at 09:49

## 2020-01-01 RX ADMIN — Medication 10 ML: at 06:10

## 2020-01-01 RX ADMIN — MIRTAZAPINE 15 MG: 15 TABLET, FILM COATED ORAL at 22:49

## 2020-01-01 RX ADMIN — IPRATROPIUM BROMIDE 0.5 MG: 0.5 SOLUTION RESPIRATORY (INHALATION) at 14:21

## 2020-01-01 RX ADMIN — CEFEPIME 2 G: 2 INJECTION, POWDER, FOR SOLUTION INTRAVENOUS at 17:53

## 2020-01-01 RX ADMIN — IOPAMIDOL 100 ML: 612 INJECTION, SOLUTION INTRAVENOUS at 13:00

## 2020-01-01 RX ADMIN — DULOXETINE 60 MG: 30 CAPSULE, DELAYED RELEASE ORAL at 08:49

## 2020-01-01 RX ADMIN — PREDNISONE 10 MG: 5 TABLET ORAL at 16:40

## 2020-01-01 RX ADMIN — HYDROCORTISONE SODIUM SUCCINATE 100 MG: 100 INJECTION, POWDER, FOR SOLUTION INTRAMUSCULAR; INTRAVENOUS at 06:15

## 2020-01-01 RX ADMIN — SODIUM CHLORIDE 40 MG: 9 INJECTION INTRAMUSCULAR; INTRAVENOUS; SUBCUTANEOUS at 22:26

## 2020-01-01 RX ADMIN — PANTOPRAZOLE SODIUM 40 MG: 40 TABLET, DELAYED RELEASE ORAL at 06:33

## 2020-01-01 RX ADMIN — POTASSIUM CHLORIDE 40 MEQ: 750 TABLET, FILM COATED, EXTENDED RELEASE ORAL at 15:14

## 2020-01-01 RX ADMIN — IPRATROPIUM BROMIDE 0.5 MG: 0.5 SOLUTION RESPIRATORY (INHALATION) at 14:56

## 2020-01-01 RX ADMIN — MIRTAZAPINE 15 MG: 15 TABLET, FILM COATED ORAL at 20:53

## 2020-01-01 RX ADMIN — DIGOXIN 0.12 MG: 125 TABLET ORAL at 09:47

## 2020-01-01 RX ADMIN — METOPROLOL TARTRATE 12.5 MG: 25 TABLET, FILM COATED ORAL at 20:59

## 2020-01-01 RX ADMIN — ONDANSETRON 4 MG: 2 INJECTION INTRAMUSCULAR; INTRAVENOUS at 20:59

## 2020-01-01 RX ADMIN — COLCHICINE 0.6 MG: 0.6 TABLET, FILM COATED ORAL at 09:08

## 2020-01-01 RX ADMIN — FUROSEMIDE 40 MG: 40 TABLET ORAL at 12:45

## 2020-01-01 RX ADMIN — METOPROLOL TARTRATE 25 MG: 25 TABLET, FILM COATED ORAL at 08:31

## 2020-01-01 RX ADMIN — PREDNISONE 10 MG: 5 TABLET ORAL at 08:11

## 2020-01-01 RX ADMIN — INSULIN LISPRO 5 UNITS: 100 INJECTION, SOLUTION INTRAVENOUS; SUBCUTANEOUS at 17:43

## 2020-01-01 RX ADMIN — DULOXETINE 60 MG: 30 CAPSULE, DELAYED RELEASE ORAL at 09:35

## 2020-01-01 RX ADMIN — DIGOXIN 0.12 MG: 125 TABLET ORAL at 08:30

## 2020-01-01 RX ADMIN — HYDROCORTISONE SODIUM SUCCINATE 25 MG: 100 INJECTION, POWDER, FOR SOLUTION INTRAMUSCULAR; INTRAVENOUS at 16:42

## 2020-01-01 RX ADMIN — Medication 1 CAPSULE: at 09:46

## 2020-01-01 RX ADMIN — APIXABAN 5 MG: 5 TABLET, FILM COATED ORAL at 08:00

## 2020-01-01 RX ADMIN — HYDROCORTISONE SODIUM SUCCINATE 50 MG: 100 INJECTION, POWDER, FOR SOLUTION INTRAMUSCULAR; INTRAVENOUS at 05:40

## 2020-01-01 RX ADMIN — DIGOXIN 0.12 MG: 125 TABLET ORAL at 11:32

## 2020-01-01 RX ADMIN — APIXABAN 5 MG: 5 TABLET, FILM COATED ORAL at 09:46

## 2020-01-01 RX ADMIN — Medication 400 MG: at 21:30

## 2020-01-01 RX ADMIN — MIRTAZAPINE 15 MG: 15 TABLET, FILM COATED ORAL at 21:29

## 2020-01-01 RX ADMIN — SODIUM CHLORIDE, SODIUM LACTATE, POTASSIUM CHLORIDE, CALCIUM CHLORIDE: 600; 310; 30; 20 INJECTION, SOLUTION INTRAVENOUS at 07:43

## 2020-01-01 RX ADMIN — Medication 400 MG: at 22:01

## 2020-01-01 RX ADMIN — SODIUM CHLORIDE AND POTASSIUM CHLORIDE: 9; 1.49 INJECTION, SOLUTION INTRAVENOUS at 17:07

## 2020-01-01 RX ADMIN — IPRATROPIUM BROMIDE 0.5 MG: 0.5 SOLUTION RESPIRATORY (INHALATION) at 14:55

## 2020-01-01 RX ADMIN — HYDROCORTISONE SODIUM SUCCINATE 100 MG: 100 INJECTION, POWDER, FOR SOLUTION INTRAMUSCULAR; INTRAVENOUS at 21:18

## 2020-01-01 RX ADMIN — BUDESONIDE 500 MCG: 0.5 INHALANT RESPIRATORY (INHALATION) at 19:57

## 2020-01-01 RX ADMIN — PREDNISONE 20 MG: 20 TABLET ORAL at 08:01

## 2020-01-01 RX ADMIN — PANTOPRAZOLE SODIUM 40 MG: 40 TABLET, DELAYED RELEASE ORAL at 05:41

## 2020-01-01 RX ADMIN — PANTOPRAZOLE SODIUM 40 MG: 40 TABLET, DELAYED RELEASE ORAL at 17:55

## 2020-01-01 RX ADMIN — POTASSIUM CHLORIDE 40 MEQ: 750 TABLET, FILM COATED, EXTENDED RELEASE ORAL at 18:26

## 2020-01-01 RX ADMIN — INSULIN LISPRO 2 UNITS: 100 INJECTION, SOLUTION INTRAVENOUS; SUBCUTANEOUS at 16:30

## 2020-01-01 RX ADMIN — ARFORMOTEROL TARTRATE 15 MCG: 15 SOLUTION RESPIRATORY (INHALATION) at 07:20

## 2020-01-01 RX ADMIN — APIXABAN 5 MG: 5 TABLET, FILM COATED ORAL at 08:41

## 2020-01-01 RX ADMIN — PREDNISONE 10 MG: 5 TABLET ORAL at 08:59

## 2020-01-01 RX ADMIN — POTASSIUM CHLORIDE 10 MEQ: 750 TABLET, FILM COATED, EXTENDED RELEASE ORAL at 08:47

## 2020-01-01 RX ADMIN — APIXABAN 5 MG: 5 TABLET, FILM COATED ORAL at 18:03

## 2020-01-01 RX ADMIN — CEFTRIAXONE 1 G: 1 INJECTION, POWDER, FOR SOLUTION INTRAMUSCULAR; INTRAVENOUS at 20:10

## 2020-01-01 RX ADMIN — FENTANYL CITRATE 50 MCG: 50 INJECTION, SOLUTION INTRAMUSCULAR; INTRAVENOUS at 07:49

## 2020-01-01 RX ADMIN — INSULIN LISPRO 2 UNITS: 100 INJECTION, SOLUTION INTRAVENOUS; SUBCUTANEOUS at 22:33

## 2020-01-01 RX ADMIN — IPRATROPIUM BROMIDE 0.5 MG: 0.5 SOLUTION RESPIRATORY (INHALATION) at 01:13

## 2020-01-01 RX ADMIN — BUDESONIDE 500 MCG: 0.5 INHALANT RESPIRATORY (INHALATION) at 21:36

## 2020-01-01 RX ADMIN — HYDROCODONE BITARTRATE AND ACETAMINOPHEN 1 TABLET: 5; 325 TABLET ORAL at 00:49

## 2020-01-01 RX ADMIN — METOPROLOL TARTRATE 25 MG: 25 TABLET, FILM COATED ORAL at 08:52

## 2020-01-01 RX ADMIN — INSULIN LISPRO 2 UNITS: 100 INJECTION, SOLUTION INTRAVENOUS; SUBCUTANEOUS at 17:00

## 2020-01-01 RX ADMIN — MIRTAZAPINE 15 MG: 15 TABLET, FILM COATED ORAL at 20:46

## 2020-01-01 RX ADMIN — DULOXETINE 60 MG: 30 CAPSULE, DELAYED RELEASE ORAL at 08:52

## 2020-01-01 RX ADMIN — DULOXETINE 60 MG: 30 CAPSULE, DELAYED RELEASE ORAL at 08:12

## 2020-01-01 RX ADMIN — APIXABAN 5 MG: 5 TABLET, FILM COATED ORAL at 09:35

## 2020-01-01 RX ADMIN — BUDESONIDE 500 MCG: 0.5 INHALANT RESPIRATORY (INHALATION) at 21:30

## 2020-01-01 RX ADMIN — INSULIN LISPRO 3 UNITS: 100 INJECTION, SOLUTION INTRAVENOUS; SUBCUTANEOUS at 07:45

## 2020-01-01 RX ADMIN — POTASSIUM CHLORIDE 10 MEQ: 750 TABLET, FILM COATED, EXTENDED RELEASE ORAL at 19:05

## 2020-01-01 RX ADMIN — DULOXETINE 60 MG: 30 CAPSULE, DELAYED RELEASE ORAL at 10:14

## 2020-01-01 RX ADMIN — PANTOPRAZOLE SODIUM 40 MG: 40 TABLET, DELAYED RELEASE ORAL at 06:15

## 2020-01-01 RX ADMIN — WATER 1 G: 1 INJECTION INTRAMUSCULAR; INTRAVENOUS; SUBCUTANEOUS at 18:13

## 2020-01-01 RX ADMIN — Medication 5 ML: at 06:00

## 2020-01-01 RX ADMIN — Medication 2 TABLET: at 08:58

## 2020-01-01 RX ADMIN — SODIUM CHLORIDE 250 ML: 900 INJECTION, SOLUTION INTRAVENOUS at 14:39

## 2020-01-01 RX ADMIN — Medication 2 TABLET: at 08:21

## 2020-01-01 RX ADMIN — Medication 10 ML: at 21:27

## 2020-01-01 RX ADMIN — ARFORMOTEROL TARTRATE: 15 SOLUTION RESPIRATORY (INHALATION) at 20:19

## 2020-01-01 RX ADMIN — METOPROLOL TARTRATE 25 MG: 25 TABLET, FILM COATED ORAL at 10:06

## 2020-01-01 RX ADMIN — CYANOCOBALAMIN TAB 500 MCG 1000 MCG: 500 TAB at 08:11

## 2020-01-01 RX ADMIN — Medication 10 ML: at 21:06

## 2020-01-01 RX ADMIN — CEFEPIME HYDROCHLORIDE 2 G: 2 INJECTION, POWDER, FOR SOLUTION INTRAVENOUS at 14:10

## 2020-01-01 RX ADMIN — ARFORMOTEROL TARTRATE 15 MCG: 15 SOLUTION RESPIRATORY (INHALATION) at 11:35

## 2020-01-01 RX ADMIN — PREDNISONE 10 MG: 5 TABLET ORAL at 09:35

## 2020-01-01 RX ADMIN — ONDANSETRON 4 MG: 2 INJECTION INTRAMUSCULAR; INTRAVENOUS at 15:34

## 2020-01-01 RX ADMIN — Medication 400 MG: at 21:52

## 2020-01-01 RX ADMIN — PREDNISONE 10 MG: 5 TABLET ORAL at 17:10

## 2020-01-01 RX ADMIN — BUDESONIDE 500 MCG: 0.5 INHALANT RESPIRATORY (INHALATION) at 07:49

## 2020-01-01 RX ADMIN — BUDESONIDE 500 MCG: 0.5 INHALANT RESPIRATORY (INHALATION) at 11:35

## 2020-01-01 RX ADMIN — INSULIN LISPRO 3 UNITS: 100 INJECTION, SOLUTION INTRAVENOUS; SUBCUTANEOUS at 16:58

## 2020-01-01 RX ADMIN — CEFTRIAXONE 2 G: 2 INJECTION, POWDER, FOR SOLUTION INTRAMUSCULAR; INTRAVENOUS at 18:55

## 2020-01-01 RX ADMIN — PREDNISONE 10 MG: 5 TABLET ORAL at 18:47

## 2020-01-01 RX ADMIN — PANTOPRAZOLE SODIUM 40 MG: 40 TABLET, DELAYED RELEASE ORAL at 07:53

## 2020-01-01 RX ADMIN — Medication 10 ML: at 05:33

## 2020-01-01 RX ADMIN — Medication 2 TABLET: at 08:54

## 2020-01-01 RX ADMIN — IPRATROPIUM BROMIDE 0.5 MG: 0.5 SOLUTION RESPIRATORY (INHALATION) at 08:01

## 2020-01-01 RX ADMIN — PREDNISONE 10 MG: 5 TABLET ORAL at 23:09

## 2020-01-01 RX ADMIN — POTASSIUM CHLORIDE 10 MEQ: 750 TABLET, FILM COATED, EXTENDED RELEASE ORAL at 08:49

## 2020-01-01 RX ADMIN — INSULIN LISPRO 2 UNITS: 100 INJECTION, SOLUTION INTRAVENOUS; SUBCUTANEOUS at 16:32

## 2020-01-01 RX ADMIN — CEFTRIAXONE 2 G: 2 INJECTION, POWDER, FOR SOLUTION INTRAMUSCULAR; INTRAVENOUS at 19:59

## 2020-01-01 RX ADMIN — MONTELUKAST SODIUM 10 MG: 10 TABLET, FILM COATED ORAL at 08:25

## 2020-01-01 RX ADMIN — Medication 2 TABLET: at 08:46

## 2020-01-01 RX ADMIN — INSULIN LISPRO 7 UNITS: 100 INJECTION, SOLUTION INTRAVENOUS; SUBCUTANEOUS at 11:56

## 2020-01-01 RX ADMIN — APIXABAN 5 MG: 5 TABLET, FILM COATED ORAL at 11:55

## 2020-01-01 RX ADMIN — ARFORMOTEROL TARTRATE 15 MCG: 15 SOLUTION RESPIRATORY (INHALATION) at 19:17

## 2020-01-01 RX ADMIN — PREDNISONE 10 MG: 5 TABLET ORAL at 17:01

## 2020-01-01 RX ADMIN — APIXABAN 5 MG: 5 TABLET, FILM COATED ORAL at 17:27

## 2020-01-01 RX ADMIN — ROCURONIUM BROMIDE 5 MG: 10 INJECTION, SOLUTION INTRAVENOUS at 07:52

## 2020-01-01 RX ADMIN — Medication 10 ML: at 05:11

## 2020-01-01 RX ADMIN — Medication 10 ML: at 15:11

## 2020-01-01 RX ADMIN — IPRATROPIUM BROMIDE 0.5 MG: 0.5 SOLUTION RESPIRATORY (INHALATION) at 07:54

## 2020-01-01 RX ADMIN — HYDROCORTISONE SODIUM SUCCINATE 25 MG: 100 INJECTION, POWDER, FOR SOLUTION INTRAMUSCULAR; INTRAVENOUS at 05:07

## 2020-01-01 RX ADMIN — FERROUS SULFATE TAB 325 MG (65 MG ELEMENTAL FE) 325 MG: 325 (65 FE) TAB at 08:20

## 2020-01-01 RX ADMIN — METOPROLOL TARTRATE 25 MG: 25 TABLET, FILM COATED ORAL at 22:52

## 2020-01-01 RX ADMIN — INSULIN LISPRO 3 UNITS: 100 INJECTION, SOLUTION INTRAVENOUS; SUBCUTANEOUS at 08:01

## 2020-01-01 RX ADMIN — CEFEPIME 2 G: 2 INJECTION, POWDER, FOR SOLUTION INTRAVENOUS at 07:04

## 2020-01-01 RX ADMIN — POTASSIUM CHLORIDE 40 MEQ: 750 TABLET, FILM COATED, EXTENDED RELEASE ORAL at 10:07

## 2020-01-01 RX ADMIN — HYDROCORTISONE SODIUM SUCCINATE 100 MG: 100 INJECTION, POWDER, FOR SOLUTION INTRAMUSCULAR; INTRAVENOUS at 21:00

## 2020-01-01 RX ADMIN — Medication 1500 MG: at 17:00

## 2020-01-01 RX ADMIN — ARFORMOTEROL TARTRATE: 15 SOLUTION RESPIRATORY (INHALATION) at 08:30

## 2020-01-01 RX ADMIN — CYANOCOBALAMIN TAB 500 MCG 1000 MCG: 500 TAB at 08:52

## 2020-01-01 RX ADMIN — POTASSIUM CHLORIDE 10 MEQ: 750 TABLET, FILM COATED, EXTENDED RELEASE ORAL at 09:35

## 2020-01-01 RX ADMIN — LOPERAMIDE HYDROCHLORIDE 4 MG: 2 CAPSULE ORAL at 17:52

## 2020-01-01 RX ADMIN — INSULIN LISPRO 2 UNITS: 100 INJECTION, SOLUTION INTRAVENOUS; SUBCUTANEOUS at 12:05

## 2020-01-01 RX ADMIN — HYDROCODONE BITARTRATE AND ACETAMINOPHEN 1 TABLET: 5; 325 TABLET ORAL at 23:54

## 2020-01-01 RX ADMIN — PREDNISONE 20 MG: 20 TABLET ORAL at 17:40

## 2020-01-01 RX ADMIN — IPRATROPIUM BROMIDE 0.5 MG: 0.5 SOLUTION RESPIRATORY (INHALATION) at 20:10

## 2020-01-01 RX ADMIN — CYANOCOBALAMIN TAB 500 MCG 1000 MCG: 500 TAB at 08:12

## 2020-01-01 RX ADMIN — METOPROLOL TARTRATE 25 MG: 25 TABLET, FILM COATED ORAL at 08:11

## 2020-01-01 RX ADMIN — HYDROCODONE BITARTRATE AND ACETAMINOPHEN 1 TABLET: 5; 325 TABLET ORAL at 10:14

## 2020-01-01 RX ADMIN — WATER 2 G: 1 INJECTION INTRAMUSCULAR; INTRAVENOUS; SUBCUTANEOUS at 18:24

## 2020-01-01 RX ADMIN — PROMETHAZINE HYDROCHLORIDE 12.5 MG: 25 TABLET ORAL at 04:52

## 2020-01-01 RX ADMIN — Medication 10 ML: at 15:07

## 2020-01-01 RX ADMIN — APIXABAN 5 MG: 5 TABLET, FILM COATED ORAL at 08:23

## 2020-01-31 ENCOUNTER — OFFICE VISIT (OUTPATIENT)
Dept: CARDIOLOGY CLINIC | Age: 75
End: 2020-01-31

## 2020-01-31 VITALS
HEART RATE: 104 BPM | DIASTOLIC BLOOD PRESSURE: 92 MMHG | BODY MASS INDEX: 41.84 KG/M2 | SYSTOLIC BLOOD PRESSURE: 138 MMHG | WEIGHT: 266.6 LBS | HEIGHT: 67 IN | OXYGEN SATURATION: 96 %

## 2020-01-31 DIAGNOSIS — R60.9 EDEMA, UNSPECIFIED TYPE: ICD-10-CM

## 2020-01-31 DIAGNOSIS — I25.10 CORONARY ARTERY DISEASE INVOLVING NATIVE CORONARY ARTERY OF NATIVE HEART WITHOUT ANGINA PECTORIS: ICD-10-CM

## 2020-01-31 DIAGNOSIS — I10 ESSENTIAL HYPERTENSION: ICD-10-CM

## 2020-01-31 DIAGNOSIS — I50.32 CHRONIC DIASTOLIC HEART FAILURE (HCC): Primary | ICD-10-CM

## 2020-01-31 RX ORDER — TORSEMIDE 20 MG/1
40 TABLET ORAL DAILY
Status: ON HOLD | COMMUNITY
Start: 2020-01-30 | End: 2020-01-01

## 2020-01-31 RX ORDER — ASPIRIN 81 MG/1
81 TABLET ORAL DAILY
Status: ON HOLD | COMMUNITY
End: 2020-01-01

## 2020-01-31 NOTE — PROGRESS NOTES
Chief Complaint Patient presents with  Follow-up Patient presnets today for a follow up visit for CAD & HTN  
 Coronary Artery Disease  Hypertension Visit Vitals BP (!) 138/92 (BP 1 Location: Left arm, BP Patient Position: Sitting) Pulse (!) 104 Ht 5' 7\" (1.702 m) Wt 266 lb 9.6 oz (120.9 kg) SpO2 96% BMI 41.76 kg/m² Chest pain denied SOB - with activity - patient has COPD, Asthma Dizziness denied Swelling/Edema - lower extremities Recent hospital visit denied Refills denied

## 2020-01-31 NOTE — PROGRESS NOTES
Rob Suazo MD 
 
[id number] [facility] [place], [address] [address] [address] [phone] Pager [phone] Fritz Carrasco is a 69 y.o. female is here for f/u visit  CAD Primary care physician: Joleen Callejas MD 
 
Patient Active Problem List  
Diagnosis Code  CAD (coronary artery disease) I25.10  Type II diabetes mellitus ([facility]) E11.9  
 NSTEMI (non-ST elevated myocardial infarction) ([address] I21.4  Coronary artery disease involving native coronary artery without angina pectoris I25.10  Essential hypertension I10  
 Recurrent deep vein thrombosis (DVT) ([facility]) I82.409  Chronic anticoagulation Z79.01  
 Chest pain R07.9  Generalized abdominal pain R10.84  Type 2 diabetes with nephropathy ([facility]) E11.21  
 Dyspnea R06.00  
 ARF (acute renal failure) ([facility]) N17.9  Obesity (BMI 30-39. 9) E66.9  Closed wedge compression fracture of T7 vertebra ([facility]) S22.060A  Partial small bowel obstruction ([address] K56.600  Sleep apnea G47.30  Atrial fibrillation ([address] I48.91  
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 113  
 Mild intermittent asthma J45.20  Gangrene of finger of right hand ([address] [id number]  Syncope and collapse R55  UTI (urinary tract infection) N39.0  
 COPD (chronic obstructive pulmonary disease) ([facility]) J44.9  Sepsis ([address] A41.9  Normocytic anemia D64.9  
 PAD (peripheral artery disease) ([facility]) I73.9  Leg wound, left B67.274X  Leg laceration, right, initial encounter Y18.358P  Severe obesity ([facility]) E66.01  
 PVD (peripheral vascular disease) ([facility]) I73.9  ASO (arteriosclerosis obliterans) I70.90 Chief complaint: 
Chief Complaint Patient presents with  Follow-up Patient presnets today for a follow up visit for CAD & HTN  
 Coronary Artery Disease  Hypertension Dear Dr Caprice Denis, 
 
 I had the pleasure of seeing Ms Tay Kingsley in the office today. Assessment: 
CAD s/p PCI to RCA with BMS ( NSTEMI 10/2015); 6/6/18- LAD stent/D1 PTCA ( CJW) Edema-component of chronic heart failure with preserved ejection fraction HTN - low HLD - intolerant to multiple statins sec to myalgia DM - HbA1c 5.6 - Oct 2019 History of right DVT-October 2016; Has had prior DVT 1998 Chronic anticoagulation Asthma - f/by Dr Esther Godinez PAD - followed by Dr Khadra Amrao. 9/9/19 L Fem-pop bypass with PTFE 
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 Pricila Murrieta at [facility] Plan: After being off lisinopril, the dizziness has resolved. She takes metoprolol on a as needed basis. On Eliquis and ASA 81 mgdaily (Not on statin ( allergic/intolerant) -per patient her cholesterol is doing well. Has been intolerant to Lipitor, Crestor, Zocor-myalgias. She had her lipids checked in February 2018 ( PCP)  and it was elevated. We have tried to get her PCSK9 inhibitors. However the cost is prohibitive  Has tried Zetia but had side effects including muscle aches.) Aggressive cardiovascular risk factor modification. Currently on torsemide 40 mg daily. If the swelling decreases and she is back to her baseline weight, she will cut it back to 20 mg daily. Continue to follow-up with PCP to get lab work done to check her renal function. Follow-up in 6 mths Patient understands the plan. All questions were answered to the patient's satisfaction. Medication Side Effects and Warnings were discussed with patient: yes Patient Labs were reviewed and or requested:  yes Patient Past Records were reviewed and or requested: yes I appreciate the opportunity to be involved in Ms. Sellers. See note below for details. Please do not hesitate to contact us with questions or concerns. Gustavo Colon MD 
 
Cardiac Testing/ Procedures: A. Cardiac Cath/PCI: 6/6/18 At [address] [facility] - LAD stent/PTCA of D1 
 
10/9/15 L Main: Medl;Nml 
 
LAD: Prox- Med; 50%; Distal - small; D1 - Small to med - prox 60% LCflex: Large; Tortuous; MLI; GUILLERMO - med - MLI 
 
RCA: Med; Prox 80%; Distal 95% just before bifurcation into small PDA and PLB LVEDP: 22 LVEF: 55%/ Mild basal inf hypokinesis No significant gradient across aortic valve. PCI: JR4 guide/BMW Pre dil with 2 by 12 balloon BMS 2.25 by 22 deployed at high [place] distally BMS 2.5 by 18 deployed at high [place] proximal lesion Post dil with 2.5 by 15 B. ECHO/MEE: Echo 7/14/2019-EF 61 to 65%, mild LVH 
6/2018 - Left ventricle: Systolic function was normal. Ejection fraction was 
estimated to be 60 %. There were no regional wall motion abnormalities. Doppler parameters were consistent with abnormal left ventricular 
relaxation (grade 1 diastolic dysfunction). Aortic valve: Leaflets exhibited normal cuspal separation and good 
mobility. Not well visualized. 10/11/15 - Left ventricle: Systolic function was vigorous. Ejection fraction was 
estimated in the range of 65 % to 70 %. There were no regional wall motion 
abnormalities. C.StressNuclear/Stress ECHO/Stress test: D.Vascular: 9/9/19 L Fem-pop bypass with PTFE 
 
E. EP: 
 
F. Miscellaneous: 
 
Subjective: 
Nimisha Garcia is a 69 y.o. female who returns for follow up  Visit. No chest pain. Continues to have chronic dyspnea on exertion/wheezing. Has skin ecchymosis secondary to being on both Eliquis and Plavix. Home blood pressures usually in the 220 range systolic. She had a reaction to Bumex-\"headache, arthralgia, abdominal symptoms\" she was switched to torsemide yesterday by PCP and she has lost 3 to 4 pounds and has been going to the restroom frequently. Swelling LE improved JUne 2019 - L hand digit amputation at [facility] 
September 2019-left lower extremity bypass surgery by Dr. Lucy Nesbitt No CP/ EDWARDS ;.  She is now on Eliquis and Plavix. Currently on hold prior to her procedure. History of recurrent DVT. ROS:cc 
(bold if positive, if negative) edema Medications before admission: 
 
Current Outpatient Medications Medication Sig Dispense  aspirin delayed-release 81 mg tablet Take  by mouth daily.  torsemide (DEMADEX) 20 mg tablet Take 40 mg by mouth daily.  ferrous sulfate (SLOW FE) 142 mg (45 mg iron) ER tablet Take  by mouth Daily (before breakfast).  traMADol (ULTRAM) 50 mg tablet Take 50 mg by mouth every six (6) hours as needed for Pain.  metoprolol tartrate (LOPRESSOR) 25 mg tablet Take 25 mg by mouth daily as needed. For systolic >721   
 glipiZIDE SR (GLUCOTROL XL) 2.5 mg CR tablet Take  by mouth nightly.  pantoprazole (PROTONIX) 40 mg tablet Take 1 Tab by mouth Before breakfast and dinner. 1 Tab  magnesium oxide (MAG-OX) 400 mg tablet Take 400 mg by mouth nightly.  montelukast (SINGULAIR) 10 mg tablet Take 10 mg by mouth daily.  potassium chloride SR (KLOR-CON 10) 10 mEq tablet Take 10 mEq by mouth two (2) times a day.  predniSONE (DELTASONE) 10 mg tablet Take 10 mg by mouth two (2) times daily (with meals).  apixaban (ELIQUIS) 5 mg tablet Take 1 Tab by mouth two (2) times a day. 60 Tab  Omeprazole delayed release (PRILOSEC D/R) 20 mg tablet Take 20 mg by mouth two (2) times a day.  ibandronate (BONIVA) 150 mg tablet Take 150 mg by mouth every thirty (30) days. Last dose 7/1/19  mirtazapine (REMERON) 15 mg tablet Take 15 mg by mouth nightly.  tiotropium (SPIRIVA WITH HANDIHALER) 18 mcg inhalation capsule Take 1 Cap by inhalation daily.  albuterol (PROVENTIL VENTOLIN) 2.5 mg /3 mL (0.083 %) nebulizer solution 2.5 mg by Nebulization route every six (6) hours as needed for Wheezing or Shortness of Breath.    
 albuterol (PROAIR HFA) 90 mcg/actuation inhaler Take 2 Puffs by inhalation every four (4) hours as needed.  lactobacillus rhamnosus gg 10 billion cell (CULTURELLE) 10 billion cell capsule Take 1 Cap by mouth daily.  biotin 10,000 mcg cap Take 1 Cap by mouth daily.  DULoxetine (CYMBALTA) 60 mg capsule Take 60 mg by mouth daily.  cholecalciferol (VITAMIN D3) 1,000 unit tablet Take 2,000 Units by mouth daily.  azelastine-fluticasone (DYMISTA) 137-50 mcg/spray spry 2 Sprays by Nasal route two (2) times a day.  mometasone-formoterol (DULERA) 200-5 mcg/actuation HFA inhaler Take 2 Puffs by inhalation two (2) times a day.  promethazine (PHENERGAN) 25 mg tablet Take 25 mg by mouth every eight (8) hours as needed for Nausea (Nausea not relieved by ondansetron). No current facility-administered medications for this visit. Physical Exam: 
Visit Vitals BP (!) 138/92 (BP 1 Location: Left arm, BP Patient Position: Sitting) Pulse (!) 104 Ht 5' 7\" (1.702 m) Wt 266 lb 9.6 oz (120.9 kg) SpO2 96% BMI 41.76 kg/m² Gen: Well-developed, well-nourished, in no acute distress, Obese Neck: Supple,No JVD, No Carotid Bruit, Resp: No accessory muscle use, Bilat  rhonchi 
Card: Regular Rate,Rythm,Normal S1, S2, No murmurs, rubs or gallop. No thrills. Abd:  Soft, non-tender, non-distended,BS+,  
MSK: No cyanosis Skin: Ecchymosis+ Neuro: moving all four extremities , follows commands appropriately Psych:  Good insight, oriented to person, place , alert, Nml Affect LE: 1+edema EKG:   
 
 
 
LABS: 
 
 
 
Lab Results Component Value Date/Time WBC 12.1 (H) 09/09/2019 06:03 AM  
 HGB 9.3 (L) 09/09/2019 06:03 AM  
 HCT 32.4 (L) 09/09/2019 06:03 AM  
 PLATELET 582 45/96/0918 06:03 AM  
 MCV 82.4 09/09/2019 06:03 AM  
 
Lab Results Component Value Date/Time  Sodium 139 08/30/2019 03:18 PM  
 Potassium 4.7 08/30/2019 03:18 PM  
 Chloride 108 08/30/2019 03:18 PM  
 CO2 22 08/30/2019 03:18 PM  
 Anion gap 9 08/30/2019 03:18 PM  
 Glucose 139 (H) 08/30/2019 03:18 PM  
 BUN 26 (H) 08/30/2019 03:18 PM  
 Creatinine 1.33 (H) 08/30/2019 03:18 PM  
 BUN/Creatinine ratio 20 08/30/2019 03:18 PM  
 GFR est AA 48 (L) 08/30/2019 03:18 PM  
 GFR est non-AA 39 (L) 08/30/2019 03:18 PM  
 Calcium 8.5 08/30/2019 03:18 PM  
 
 
Lab Results Component Value Date/Time  
 aPTT 20.6 (L) 08/30/2019 03:18 PM  
 
Lab Results Component Value Date/Time INR 1.0 08/30/2019 03:18 PM  
 INR 1.0 07/30/2019 09:35 AM  
 INR 1.1 04/26/2019 10:52 AM  
 Prothrombin time 9.8 08/30/2019 03:18 PM  
 Prothrombin time 9.7 07/30/2019 09:35 AM  
 Prothrombin time 11.3 (H) 04/26/2019 10:52 AM  
 
No components found for: LAST LIPIDS Sarah Paredes MD

## 2020-03-23 PROBLEM — L03.90 CELLULITIS: Status: ACTIVE | Noted: 2020-01-01

## 2020-03-23 PROBLEM — R10.9 ABDOMINAL PAIN: Status: ACTIVE | Noted: 2018-06-27

## 2020-03-23 PROBLEM — E87.1 HYPONATREMIA: Status: ACTIVE | Noted: 2020-01-01

## 2020-03-23 PROBLEM — L03.119 CELLULITIS, LEG: Status: ACTIVE | Noted: 2020-01-01

## 2020-03-23 PROBLEM — R19.7 DIARRHEA: Status: ACTIVE | Noted: 2020-01-01

## 2020-03-23 PROBLEM — E87.6 HYPOKALEMIA: Status: ACTIVE | Noted: 2020-01-01

## 2020-03-23 NOTE — PROGRESS NOTES
Bedside shift change report given to [facility], RN (oncoming nurse) by Piper Meyers RN (offgoing nurse). Report included the following information SBAR, Kardex, Procedure Summary, Intake/Output, MAR and Recent Results.

## 2020-03-23 NOTE — ED PROVIDER NOTES
Patient is a 31-year-old female presenting to the emergency department with cough, shortness breath, abdominal pain and diarrhea. Patient states that her symptoms started approximately 2 weeks ago patient also complaining of bilateral lower extremity pain, redness. She feels that the redness has gotten worse over the last few days. Patient also has an open sore on her right lower leg. Patient denies chest pain denies dizziness or lightheadedness. Patient has a significant past medical history noting asthma, A. fib, coronary artery disease. Past Medical History:  
Diagnosis Date  Asthma  Atrial fibrillation ([address] 11/16/2018  Autoimmune disease ([address]   
 fibromyalgia  CAD (coronary artery disease) 10/9/2015  Closed wedge compression fracture of T7 vertebra ([address] 11/13/2018  Diabetes ([facility]  DVT (deep vein thrombosis) in pregnancy  GERD (gastroesophageal reflux disease)  Heart failure ([address]  HTN (hypertension)  NSTEMI (non-ST elevated myocardial infarction) ([address] 10/9/2015  Obesity (BMI 30-39.9) 11/13/2018  Sleep apnea   
 wears CPAP Past Surgical History:  
Procedure Laterality Date  ABDOMEN SURGERY PROC UNLISTED    
 hital hernia repair, gall bladder removed  AMPUTATION TRANSMETACARPAL Right 06/12/2019  
 entire ring finger, 2nd & 3rd fingers (transmetacarpal)  BREAST SURGERY PROCEDURE UNLISTED    
 lumpectomy  CARDIAC SURG PROCEDURE UNLIST  10/9/15  
 2 stents [facility]  HX COLOSTOMY    
 and reversal, post episiotomy repair [facility]  HX UROLOGICAL  2009  
 bladder sling Family History:  
Problem Relation Age of Onset  Diabetes Mother  Heart Failure Mother  Kidney Disease Mother  Diabetes Father  Heart Disease Father  Elevated Lipids Father  Heart Failure Father  Heart Disease Brother  Cancer Brother   
     kidney  Diabetes Brother  No Known Problems Brother  No Known Problems Brother Social History Socioeconomic History  Marital status:  Spouse name: Not on file  Number of children: Not on file  Years of education: Not on file  Highest education level: Not on file Occupational History  Not on file Social Needs  Financial resource strain: Not on file  Food insecurity Worry: Not on file Inability: Not on file  Transportation needs Medical: Not on file Non-medical: Not on file Tobacco Use  Smoking status: Former Smoker Last attempt to quit: 3/30/2017 Years since quittin.9  Smokeless tobacco: Never Used Substance and Sexual Activity  Alcohol use: No  
  Alcohol/week: 0.0 standard drinks Comment: very very rarely  Drug use: No  
 Sexual activity: Never Lifestyle  Physical activity Days per week: Not on file Minutes per session: Not on file  Stress: Not on file Relationships  Social connections Talks on phone: Not on file Gets together: Not on file Attends [employer] service: Not on file Active member of club or organization: Not on file Attends meetings of clubs or organizations: Not on file Relationship status: Not on file  Intimate partner violence Fear of current or ex partner: Not on file Emotionally abused: Not on file Physically abused: Not on file Forced sexual activity: Not on file Other Topics Concern  Not on file Social History Narrative  Not on file ALLERGIES: Advair diskus [fluticasone propion-salmeterol]; Aspartame; Breo ellipta [fluticasone furoate-vilanterol]; Bumex [bumetanide]; Ciprofloxacin; Statins-hmg-coa reductase inhibitors; Sulfa (sulfonamide antibiotics); Tetracycline; and Zetia [ezetimibe] Review of Systems Constitutional: Positive for activity change and fatigue. Negative for fever. Respiratory: Positive for cough and shortness of breath. Gastrointestinal: Positive for abdominal pain. Musculoskeletal: Positive for arthralgias. Skin: Positive for color change and rash. All other systems reviewed and are negative. Vitals:  
 03/23/20 1245 03/23/20 1345 03/23/20 1415 03/23/20 1515 BP: 118/65 111/60 96/58 107/57 Pulse: (!) 105 (!) 107 (!) 114 (!) 102 Resp: 20 25 16 20 Temp:      
SpO2: 90% 93% 96% 93% Weight:      
Height:      
      
 
Physical Exam 
Vitals signs and nursing note reviewed. Constitutional:   
   Appearance: She is well-developed. HENT:  
   Head: Normocephalic and atraumatic. Eyes:  
   Extraocular Movements: Extraocular movements intact. Pupils: Pupils are equal, round, and reactive to light. Cardiovascular:  
   Rate and Rhythm: Tachycardia present. Rhythm irregular. Pulmonary:  
   Effort: Pulmonary effort is normal. No respiratory distress. Breath sounds: Wheezing present. Abdominal:  
   General: Bowel sounds are normal. There is distension. Palpations: Abdomen is soft. Tenderness: There is generalized abdominal tenderness. Skin: 
   General: Skin is warm. Comments: Bilateral lower extremity erythema tender to touch warm to touch approximately 2 cm open ulceration to the medial anterior tibia. Neurological:  
   General: No focal deficit present. Mental Status: She is alert and oriented to person, place, and time. MDM Number of Diagnoses or Management Options Cellulitis of lower extremity, unspecified laterality:  
  
Amount and/or Complexity of Data Reviewed Clinical lab tests: ordered and reviewed Tests in the radiology section of CPT®: reviewed and ordered Independent visualization of images, tracings, or specimens: yes Risk of Complications, Morbidity, and/or Mortality Presenting problems: moderate Diagnostic procedures: moderate Management options: moderate Patient Progress Patient progress: stable Procedures Hospitalist Perfect Serve for Admission 5:00 PM 
 
ED Room Number: ER09/09 Patient Name and age:  Denise López 69 y.o.  female Working Diagnosis: 1. Cellulitis of lower extremity, unspecified laterality COVID-19 Suspicion:  no 
Readmission: no 
Isolation Requirements:  no 
Recommended Level of Care:  med/surg Code Status:  Full Code Department:[facility] ED - [phone] Other:

## 2020-03-23 NOTE — ED TRIAGE NOTES
Pt reports cough, sob, abdominal pain and diarrhea that started 2 weeks ago. Pt denies sick contacts. Pt also reports leg swelling and redness that has gotten worse in the last few days.

## 2020-03-23 NOTE — ED NOTES
TRANSFER - OUT REPORT: 
 
Verbal report given to Norma RN(name) on Pa Simeon  being transferred to 5th floor(unit) for routine progression of care Report consisted of patients Situation, Background, Assessment and  
Recommendations(SBAR). Information from the following report(s) SBAR, Kardex, ED Summary, [facility] and Recent Results was reviewed with the receiving nurse. Lines:  
Peripheral IV 03/23/20 Forearm (Active) Peripheral IV 03/23/20 Right Forearm (Active) Site Assessment Clean, dry, & intact 3/23/2020 10:23 AM  
Phlebitis Assessment 0 3/23/2020 10:23 AM  
Infiltration Assessment 0 3/23/2020 10:23 AM  
Dressing Type Tape;Transparent 3/23/2020 10:23 AM  
Hub Color/Line Status Blue;Flushed;Patent 3/23/2020 10:23 AM  
Action Taken Blood drawn 3/23/2020 10:23 AM  
  
 
Opportunity for questions and clarification was provided. Patient transported with: 
 [employer]

## 2020-03-24 NOTE — PROGRESS NOTES
Physical Therapy; 
2nd attempt: patient received pain medication. patient is still refusing, multiple alternate options given to patient. She states \" I am going to be a pain today, I am not going to do anything\". We will re-attempt tomorrow. Orders received and chart reviewed. Patient currently requesting pain medication prior to mobilizing. Nursing notified. We will re-attempt. Thank you.  
 
Zulema  PT,DPT,NCS

## 2020-03-24 NOTE — PROGRESS NOTES
CM Note: 
Reason for Admission:  Bilateral leg cellulitis RUR Score:     24-high PCP: First and Last name: Sara Luke Name of Practice:  [facility] Are you a current patient: Yes/No: yes Approximate date of last visit: 2 weeks ago Resources/supports as identified by patient/family:   Daughters and family very supportive Top Challenges facing patient (as identified by patient/family and CM): Finances/Medication cost?      Able to afford; she has Rx coverage and gets her medications from [facility] [place] Transportation? Family drives as does the pt Support system or lack thereof? See above Living arrangements? Lives by herself in an apartment in independent living at [facility] Self-care/ADLs/Cognition? Pt is independent with ADL's and is alert and oriented. Her dtrs help by providing groceries, meals and cleaning 2x/month Current Advanced Directive/Advance Care Plan:  Yes but not on file Plan for utilizing home health: To be dtermined; has had [address] in the past 
              
Transition of Care Plan: 1. PT/OT consults 2. Family to transport at d/c [facility] Management Interventions PCP Verified by CM: Yes Discharge Durable Medical Equipment: (At home: cpap, RW, rollator, shower chair, raised toilet seat) Physical Therapy Consult: Yes Occupational Therapy Consult: Yes Current Support Network: Family Lives Nearby, Assisted Living, Lives Alone Confirm Follow Up Transport: Family Discharge Location Discharge Placement: Assisted Living

## 2020-03-24 NOTE — PROGRESS NOTES
Daily Progress Note: 3/24/2020 Jesus Lee MD 
 
Assessment/Plan:  
Cellulitis of bilateral legs(3/23/2020). Admit to medical.  She was given Zosyn and clindamycin in ER. Will change to Ancef and monitor clinically. If worsening may escalate antibiotics. Lactic Acidosis - 4.4 at admission  
- resolved (2.2 3/23) 
  
Diarrhea (3/23/2020)/ Abdominal pain (6/27/2018). Will check CT of the abdomen and send stool studies. Pain management and IV fluids for now 
  
Hyponatremia (3/23/2020)/ Hypokalemia (3/23/2020). This is most likely secondary to diarrhea. Replete potassium and continue normal saline IV fluids. 
  
CAD (coronary artery disease) (10/9/2015)/chronic Atrial fibrillation ([facility] (11/16/2018)/Essential hypertension (1/22/2016). Continue home medications and monitor on remote telemetry.  
  
Type II diabetes mellitus ([facility] (10/9/2015). Continue home medication and cover with sliding scale. Diabetic diet 
  
Sleep apnea (1/5/2019). Uses CPAP at home. May continue to use while she is here. OPD (chronic obstructive pulmonary disease) ([facility] (7/13/2019). Not wheezing stable. continue nebulizer treatments 
  
Severe obesity ([facility] (7/30/2019). Would benefit from weight loss. Hypokalemia 
- monitor and replace as needed   
 
Problem List: 
Problem List as of 3/24/2020 Date Reviewed: 8/1/2019 Codes Class Noted - Resolved * (Principal) Cellulitis ICD-10-CM: L03.90 ICD-9-CM: 682.9  3/23/2020 - Present Hypokalemia ICD-10-CM: E87.6 ICD-9-CM: 276.8  3/23/2020 - Present Hyponatremia ICD-10-CM: E87.1 ICD-9-CM: 276.1  3/23/2020 - Present Diarrhea ICD-10-CM: R19.7 ICD-9-CM: 787.91  3/23/2020 - Present Cellulitis, leg ICD-10-CM: L03.119 ICD-9-CM: 682.6  3/23/2020 - Present ASO (arteriosclerosis obliterans) ICD-10-CM: I70.90 ICD-9-CM: 440.9  9/5/2019 - Present PVD (peripheral vascular disease) ([facility] ICD-10-CM: I73.9 ICD-9-CM: 443.9  8/1/2019 - Present Severe obesity ([facility] ICD-10-CM: E66.01 
ICD-9-CM: 278.01  7/30/2019 - Present Syncope and collapse ICD-10-CM: R55 
ICD-9-CM: 780.2  7/13/2019 - Present UTI (urinary tract infection) ICD-10-CM: N39.0 ICD-9-CM: 599.0  7/13/2019 - Present COPD (chronic obstructive pulmonary disease) ([facility]) ICD-10-CM: J44.9 ICD-9-CM: 567  7/13/2019 - Present Sepsis ([facility] ICD-10-CM: A41.9 ICD-9-CM: 038.9, 995.91  7/13/2019 - Present Normocytic anemia ICD-10-CM: D64.9 ICD-9-CM: 285.9  7/13/2019 - Present PAD (peripheral artery disease) ([facility]) ICD-10-CM: I73.9 ICD-9-CM: 443.9  7/13/2019 - Present Leg wound, left ICD-10-CM: Z85.871R ICD-9-CM: 891.0  7/13/2019 - Present Leg laceration, right, initial encounter ICD-10-CM: N62.441P ICD-9-CM: 894.0  7/13/2019 - Present Cellulitis of right upper arm ICD-10-CM: L03.113 ICD-9-CM: 682.3  5/17/2019 - Present Mild intermittent asthma ICD-10-CM: J45.20 ICD-9-CM: 493.90  5/17/2019 - Present Gangrene of finger of right hand [facility]) ICD-10-CM: M10 
ICD-9-CM: 785.4  5/17/2019 - Present Brachial artery thrombus ([facility]) ICD-10-CM: Y90.6 ICD-9-CM: 444.21  4/26/2019 - Present Bowel obstruction ([facility] ICD-10-CM: T10.150 ICD-9-CM: 560.9  1/8/2019 - Present Partial small bowel obstruction ([address] ICD-10-CM: K56.600 ICD-9-CM: 560.9  1/5/2019 - Present Sleep apnea ICD-10-CM: G47.30 ICD-9-CM: 780.57  1/5/2019 - Present Overview Signed 1/5/2019  8:41 PM by Skip Vinay, DO  
  wears CPAP Atrial fibrillation [facility]) ICD-10-CM: I48.91 
ICD-9-CM: 427.31  11/16/2018 - Present Dyspnea ICD-10-CM: R06.00 
ICD-9-CM: 786.09  11/13/2018 - Present ARF (acute renal failure) (HCC) ICD-10-CM: N17.9 ICD-9-CM: 584.9  11/13/2018 - Present Obesity (BMI 30-39.9) (Chronic) ICD-10-CM: P47.5 ICD-9-CM: 278.00  11/13/2018 - Present Closed wedge compression fracture of T7 vertebra ([facility] ICD-10-CM: X87.029G ICD-9-CM: 805.2  11/13/2018 - Present Type 2 diabetes with nephropathy ([facility] ICD-10-CM: E11.21 
ICD-9-CM: 250.40, 583.81  10/1/2018 - Present Chest pain ICD-10-CM: R07.9 ICD-9-CM: 786.50  6/27/2018 - Present Abdominal pain ICD-10-CM: R10.9 ICD-9-CM: 789.00  6/27/2018 - Present Recurrent deep vein thrombosis (DVT) (HCC) ICD-10-CM: I82.409 ICD-9-CM: 453.40  3/30/2018 - Present Chronic anticoagulation (Chronic) ICD-10-CM: Z79.01 
ICD-9-CM: V58.61  3/30/2018 - Present Coronary artery disease involving native coronary artery without angina pectoris (Chronic) ICD-10-CM: I25.10 ICD-9-CM: 414.01  1/22/2016 - Present Essential hypertension (Chronic) ICD-10-CM: I10 
ICD-9-CM: 401.9  1/22/2016 - Present CAD (coronary artery disease) ICD-10-CM: I25.10 ICD-9-CM: 414.00  10/9/2015 - Present Type II diabetes mellitus (HCC) (Chronic) ICD-10-CM: E11.9 ICD-9-CM: 250.00  10/9/2015 - Present NSTEMI (non-ST elevated myocardial infarction) ([facility] ICD-10-CM: I21.4 ICD-9-CM: 410.70  10/9/2015 - Present RESOLVED: HTN (hypertension) ICD-10-CM: I10 
ICD-9-CM: 401.9  10/9/2015 - 1/22/2016 Subjective:  
  69 y.o.  female who is admitted with bilateral leg cellulitis. Ms. Harley Mitchell with past medical history of diabetes mellitus, CAD, GERD, DVT, hypertension, CAD, obesity, HAYES presented to ER complaining of abdominal pain, diarrhea, and poor appetite, which started about 2 weeks ago. The abdominal pain is generalized, sharp mild to moderate intensity associated with watery diarrhea. Last diarrhea was this morning which was watery. Denies fever. Patient has not been eating much the last 2 weeks. Denies shortness of breath, but has chronic cough.   Has been having bilateral leg swelling, redness and pain. (Dr Kevin Quach 
 
3/24:  Feeling better this AM.  Nausea/vomiting resolved. No loose stools so far this AM.  Lactic acid back in normal range. WBC ok. Cultures neg so far. Tmax 99.4. K+ still low - replacing. Review of Systems: A comprehensive review of systems was negative except for that written in the HPI. Objective:  
Physical Exam:  
 
Visit Vitals BP 92/56 (BP 1 Location: Left arm, BP Patient Position: At rest) Pulse (!) 101 Temp 98.3 °F (36.8 °C) Resp 18 Ht 5' 7\" (1.702 m) Wt 113.4 kg (250 lb) SpO2 98% BMI 39.16 kg/m² O2 Device: Room air Temp (24hrs), Av.6 °F (37 °C), Min:98.3 °F (36.8 °C), Max:99.4 °F (37.4 °C) 
   1901 -  0700 In: 61 [P.O.:60] Out: -    No intake/output data recorded. General:  Alert, obese, cooperative, no distress, appears stated age. Head:  Normocephalic, without obvious abnormality, atraumatic. Eyes:  Conjunctivae/corneas clear. PERRL, EOMs intact. Throat: Lips, mucosa, and tongue moist.. Neck: Supple, symmetrical, trachea midline, no adenopathy, thyroid: no enlargement/tenderness/nodules, no carotid bruit and no JVD. Lungs:   Clear to auscultation bilaterally. Chest wall:  No tenderness or deformity. Heart:  Regular rate and rhythm, S1, S2 normal, no murmur, click, rub or gallop. Abdomen:   Soft, non-tender. Bowel sounds normal. No masses,  No organomegaly. Extremities: no cyanosis. 2+ LE edema. No calf tenderness or cords. Redness and tenderness ant tibial surfaces bilat. Pulses: 2+ and symmetric all extremities. Skin: Skin color, texture, turgor normal. No rashes or lesions Neurologic: CNII-XII intact. Alert and oriented X 3. Fine motor of hands and fingers normal.   equal.  No cogwheeling or rigidity. Gait not tested at this time. Sensation grossly normal to touch. Gross motor of extremities normal.    
 
Data Review: CT AB and Pelvis 3/23/20:   INDICATION: Abdominal pain, cough, shortness of breath. Exam: Noncontrast CT of the abdomen and pelvis is performed with 5 mm 
collimation. Sagittal and coronal reformatted images were also performed. CT dose reduction was achieved through the use of a standardized protocol 
tailored for this examination and automatic exposure control for dose 
modulation. Direct comparison is made to prior CT dated January 2019. FINDINGS: 
There is minimal bibasilar linear scarring. There is a small hiatal hernia, 
unchanged. Abdomen:  
Liver: There is diffuse fatty infiltration of the liver. Spleen: The spleen is normal on noncontrast images. Adrenals: The adrenals are normal on noncontrast images. Pancreas: The pancreas is normal on noncontrast images. Gallbladder: The gallbladder is surgically absent. Kidneys: The kidneys are atrophic bilaterally. The right kidney measures 7.4 cm 
in sagittal length. Left kidney measures 8.3 cm in sagittal length. There is no 
hydronephrosis. Bowel: No thickened or dilated loop of large or small bowel seen. Appendix: The appendix is normal. 
Pelvis: Urinary bladder is partially filled and grossly normal. 
Bones: The osseous structures are diffusely demineralized. T7 compression 
fracture and kyphoplasty post procedure changes are noted. Miscellaneous: There is a 5.1 cm x 4.5 cm low-attenuation, thin-walled fluid 
collection anterior to the left femoral vessels, not present on prior CT dated January 2019. There is no free intraperitoneal gas or fluid. There is no focal 
intraperitoneal fluid collection to suggest abscess. There is a small 4.5 cm x 
2.8 cm fat-containing periumbilical hernia. The uterus is absent. IMPRESSION:  
1. 5.1 cm x 4.5 cm low-attenuation, thin-walled fluid collection anterior to the 
left femoral vessels, not present on prior CT dated January 2019. Finding likely 
represents a hematoma. Recommend clinical correlation. 2. Small hiatal hernia. 3. Hepatic steatosis. 4. No bowel obstruction, ileus or perforation. No intra-abdominal abscess. Recent Days: 
Recent Labs  
  03/24/20 
0410 03/23/20 
0815 WBC 7.4 8.3 HGB 11.8 13.6 HCT 38.4 44.3  336 Recent Labs  
  03/24/20 
0410 03/23/20 
0815 * 134* K 3.1* 2.9*  
 97 CO2 25 25 GLU 93 119* BUN 10 9 CREA 1.21* 1.39* CA 7.1* 8.2* ALB  --  2.2* TBILI  --  0.9 SGOT  --  70* ALT  --  35 No results for input(s): PH, PCO2, PO2, HCO3, FIO2 in the last 72 hours. 24 Hour Results: 
Recent Results (from the past 24 hour(s)) SAMPLES BEING HELD Collection Time: 03/23/20  8:15 AM  
Result Value Ref Range SAMPLES BEING HELD 1RED,1SST   
 COMMENT Add-on orders for these samples will be processed based on acceptable specimen integrity and analyte stability, which may vary by analyte. METABOLIC PANEL, COMPREHENSIVE Collection Time: 03/23/20  8:15 AM  
Result Value Ref Range Sodium 134 (L) 136 - 145 mmol/L Potassium 2.9 (L) 3.5 - 5.1 mmol/L Chloride 97 97 - 108 mmol/L  
 CO2 25 21 - 32 mmol/L Anion gap 12 5 - 15 mmol/L Glucose 119 (H) 65 - 100 mg/dL BUN 9 6 - 20 MG/DL Creatinine 1.39 (H) 0.55 - 1.02 MG/DL  
 BUN/Creatinine ratio 6 (L) 12 - 20 GFR est AA 45 (L) >60 ml/min/1.73m2 GFR est non-AA 37 (L) >60 ml/min/1.73m2 Calcium 8.2 (L) 8.5 - 10.1 MG/DL Bilirubin, total 0.9 0.2 - 1.0 MG/DL  
 ALT (SGPT) 35 12 - 78 U/L  
 AST (SGOT) 70 (H) 15 - 37 U/L Alk. phosphatase 164 (H) 45 - 117 U/L Protein, total 6.2 (L) 6.4 - 8.2 g/dL Albumin 2.2 (L) 3.5 - 5.0 g/dL Globulin 4.0 2.0 - 4.0 g/dL A-G Ratio 0.6 (L) 1.1 - 2.2    
CBC WITH AUTOMATED DIFF Collection Time: 03/23/20  8:15 AM  
Result Value Ref Range WBC 8.3 3.6 - 11.0 K/uL  
 RBC 4.64 3.80 - 5.20 M/uL  
 HGB 13.6 11.5 - 16.0 g/dL HCT 44.3 35.0 - 47.0 %  MCV 95.5 80.0 - 99.0 FL  
 MCH 29.3 26.0 - 34.0 PG  
 MCHC 30.7 30.0 - 36.5 g/dL  
 RDW 16.2 (H) 11.5 - 14.5 % PLATELET 762 124 - 817 K/uL MPV 10.3 8.9 - 12.9 FL  
 NRBC 0.0 0  WBC ABSOLUTE NRBC 0.00 0.00 - 0.01 K/uL NEUTROPHILS 60 32 - 75 % LYMPHOCYTES 25 12 - 49 % MONOCYTES 11 5 - 13 % EOSINOPHILS 2 0 - 7 % BASOPHILS 1 0 - 1 % IMMATURE GRANULOCYTES 1 (H) 0.0 - 0.5 % ABS. NEUTROPHILS 5.0 1.8 - 8.0 K/UL  
 ABS. LYMPHOCYTES 2.1 0.8 - 3.5 K/UL  
 ABS. MONOCYTES 0.9 0.0 - 1.0 K/UL  
 ABS. EOSINOPHILS 0.2 0.0 - 0.4 K/UL  
 ABS. BASOPHILS 0.1 0.0 - 0.1 K/UL  
 ABS. IMM. GRANS. 0.1 (H) 0.00 - 0.04 K/UL  
 DF AUTOMATED    
LACTIC ACID Collection Time: 03/23/20  8:38 AM  
Result Value Ref Range Lactic acid 4.4 (HH) 0.4 - 2.0 MMOL/L  
CULTURE, BLOOD Collection Time: 03/23/20  8:38 AM  
Result Value Ref Range Special Requests: NO SPECIAL REQUESTS Culture result: NO GROWTH AFTER 18 HOURS    
EKG, 12 LEAD, INITIAL Collection Time: 03/23/20  9:04 AM  
Result Value Ref Range Ventricular Rate 105 BPM  
 Atrial Rate 105 BPM  
 P-R Interval 144 ms QRS Duration 86 ms  
 Q-T Interval 404 ms QTC Calculation (Bezet) 533 ms Calculated P Axis 51 degrees Calculated R Axis -83 degrees Calculated T Axis 29 degrees Diagnosis ** Poor data quality, interpretation may be adversely affected Sinus tachycardia Left axis deviation Prolonged QT Abnormal ECG When compared with ECG of 06-SEP-2019 09:32, 
QT has lengthened Confirmed by Crispin Escobar MD., Jasmina ([id number]) on 3/23/2020 6:38:09 PM 
  
LACTIC ACID Collection Time: 03/23/20 10:25 AM  
Result Value Ref Range Lactic acid 2.0 0.4 - 2.0 MMOL/L  
GLUCOSE, POC Collection Time: 03/23/20  9:32 PM  
Result Value Ref Range Glucose (POC) 83 65 - 100 mg/dL Performed by Varsha Johnson (PCT) METABOLIC PANEL, BASIC Collection Time: 03/24/20  4:10 AM  
Result Value Ref Range Sodium 135 (L) 136 - 145 mmol/L  Potassium 3.1 (L) 3.5 - 5.1 mmol/L  
 Chloride 101 97 - 108 mmol/L  
 CO2 25 21 - 32 mmol/L Anion gap 9 5 - 15 mmol/L Glucose 93 65 - 100 mg/dL BUN 10 6 - 20 MG/DL Creatinine 1.21 (H) 0.55 - 1.02 MG/DL  
 BUN/Creatinine ratio 8 (L) 12 - 20 GFR est AA 53 (L) >60 ml/min/1.73m2 GFR est non-AA 44 (L) >60 ml/min/1.73m2 Calcium 7.1 (L) 8.5 - 10.1 MG/DL  
CBC W/O DIFF Collection Time: 03/24/20  4:10 AM  
Result Value Ref Range WBC 7.4 3.6 - 11.0 K/uL  
 RBC 3.98 3.80 - 5.20 M/uL  
 HGB 11.8 11.5 - 16.0 g/dL HCT 38.4 35.0 - 47.0 % MCV 96.5 80.0 - 99.0 FL  
 MCH 29.6 26.0 - 34.0 PG  
 MCHC 30.7 30.0 - 36.5 g/dL  
 RDW 16.1 (H) 11.5 - 14.5 % PLATELET 085 450 - 653 K/uL MPV 9.6 8.9 - 12.9 FL  
 NRBC 0.0 0  WBC ABSOLUTE NRBC 0.00 0.00 - 0.01 K/uL Medications reviewed Current Facility-Administered Medications Medication Dose Route Frequency  sodium chloride (NS) flush 5-40 mL  5-40 mL IntraVENous Q8H  
 sodium chloride (NS) flush 5-40 mL  5-40 mL IntraVENous PRN  
 ceFAZolin (ANCEF) 2 g in sterile water (preservative free) 20 mL IV syringe  2 g IntraVENous Q8H  
 insulin lispro (HUMALOG) injection   SubCUTAneous AC&HS  
 glucose chewable tablet 16 g  4 Tab Oral PRN  
 glucagon (GLUCAGEN) injection 1 mg  1 mg IntraMUSCular PRN  
 dextrose 10% infusion 0-250 mL  0-250 mL IntraVENous PRN  
 0.9% sodium chloride with KCl 20 mEq/L infusion   IntraVENous CONTINUOUS  
 acetaminophen (TYLENOL) tablet 325 mg  325 mg Oral Q4H PRN  
 ondansetron (ZOFRAN) injection 4 mg  4 mg IntraVENous Q4H PRN Care Plan discussed with: Patient/Family and Nurse Total time spent with patient: 30 minutes.  
 
Ferny Shelton MD

## 2020-03-24 NOTE — CDMP QUERY
Good Afternoon, Patient was admitted for BLE cellulitis. According to the clinical data documented within the patient's medical record the patient may meet criteria for Sepsis. If possible please document within the progress note and discharge if you are evaluating and/or treating any of the following: 
==>Sepsis POA 2/2 BLE Celluliits 
==>SIRS POA 2/2 BLE Cellulitis 
==>Other unspecified 
==>Unable to determine Risk Factors: 69 Yr F admitted with BLE cellulitis Clinical Criteria: Patient arrived to the ED with c/o cough, SOB, abd pain and diarrhea with BLE swelling/redness. On arrival to the ED patient had -114 with a lactic acid level of 4.4 now 2.0 and RR 29-32. Patient received NS IVF bolus in ED and started on IV abx. Treatment: Daily CBC/BMP, CXR, CT abd/pelvis, Lactic, 1,000 NS IVF NS bolus, NS with 20 K IVF at 75 ML/HR, Ancef 2 G IV Q 8 hrs, Zoysn 3.375 mg IV once and Cleocin 600 mg IV once. Thank you, Kiel Hand [facility] Sepsis  (BSV Approved definition) Documented Source of suspected or confirmed infection as manifested by 2 or more of the following, not easily explained by another co-existing condition: 
Temperature:  >38C (100.9F)   -OR-  <36C  (96.8F) Heart Rate:  > 90 bpm 
Respiratory Rate:  > 20 / min  -OR-  PaCO2 < 32 WBC:  > 12K  -OR- < 4K  -OR- Bands > 10 Explicitly link all related/associated Acute Organ Dysfunction to Sepsis:     
Encephalopathy Sepsis-induced Hypotension (or)  Septic Shock [SBP < 90 (or) MAP <65 (or) SBP drop > 40 points from baseline] Hypoxemia (or)  Acute Respiratory Failure [need for invasive or non-invasive ventilation OR- pO2 < 60 mmHg] Acute Kidney Injury (or) Acute Renal Failure OR- Oliguria [serum Creatinine > 2.0 OR- Urine Output < 0.5ml/kg/hr for 2 hours] Ileus (absent bowel sounds) Coagulopathy  DIC  Thrombocytopenia [Platelet Count < 358,366 OR- INR > 1.5 OR- aPTT >60 sec] Hyperbilirubinemia     [Bilirubin > 2 mg/dL] Hyperlactatemia   [Lactic Acid > 2.0] Critical-Illness Myopathy or Polyneuropathy Severe Sepsis = Sepsis with associated Acute Organ Dysfunction Septic Shock = Refractory hypotension refractory to aggressive volume replacement and often requiring vasopressor therapy like dopamine  -OR-  Lactic Acidosis  [Lactic Acid > 4.0].

## 2020-03-24 NOTE — PROGRESS NOTES
[facility]: MED RECONCILIATION Comments/Recommendations:  
 Medication reconciliation completed with patient over the phone. Patient knowledgeable regarding medications. Confirmed preferred [facility] as [facility]. Torsemide added to allergies as patient stated cause significant myalgias and she could not tolerate. Patient attempted to take medications this morning but was unable to keep them down. Medications added:  
 
none Medications removed: 
 
Montelukast 
Pantoprazole 
torsemide Medications adjusted: 
 
Dymista daily adjusted from BID Glipizide ER 10mg BID adjusted from ER 2.5mg QHS Information obtained from: patient, Rx query, outpatient MD notes Allergies: Advair diskus [fluticasone propion-salmeterol]; Aspartame; Breo ellipta [fluticasone furoate-vilanterol]; Bumex [bumetanide]; Ciprofloxacin; Statins-hmg-coa reductase inhibitors; Sulfa (sulfonamide antibiotics); Tetracycline; Torsemide; and Zetia [ezetimibe] Prior to Admission Medications:  
 
Prior to Admission Medications Prescriptions Last Dose Informant Patient Reported? Taking? DULoxetine (CYMBALTA) 60 mg capsule 3/22/2020 Self Yes Yes Sig: Take 60 mg by mouth daily. Omeprazole delayed release (PRILOSEC D/R) 20 mg tablet 3/22/2020 Self Yes Yes Sig: Take 20 mg by mouth two (2) times a day. albuterol (PROAIR HFA) 90 mcg/actuation inhaler  Self Yes Yes Sig: Take 2 Puffs by inhalation every four (4) hours as needed. albuterol (PROVENTIL VENTOLIN) 2.5 mg /3 mL (0.083 %) nebulizer solution  Self Yes Yes Si.5 mg by Nebulization route every six (6) hours as needed for Wheezing or Shortness of Breath. apixaban (ELIQUIS) 5 mg tablet 3/22/2020 Self No Yes Sig: Take 1 Tab by mouth two (2) times a day. aspirin delayed-release 81 mg tablet 3/22/2020 Self Yes Yes Sig: Take 81 mg by mouth daily. azelastine-fluticasone (DYMISTA) 137-50 mcg/spray spry 3/22/2020 Self Yes Yes Si Sprays by Nasal route daily. biotin 10,000 mcg cap 3/22/2020 Self Yes Yes Sig: Take 1 Cap by mouth daily. cholecalciferol, vitamin D3, (Vitamin D3) 50 mcg (2,000 unit) tab 3/22/2020 Self Yes Yes Sig: Take 2,000 Units by mouth daily. ferrous sulfate (SLOW FE) 142 mg (45 mg iron) ER tablet 3/22/2020 Self Yes Yes Sig: Take 142 mg by mouth Daily (before breakfast). glipiZIDE SR (GLUCOTROL XL) 10 mg CR tablet 3/22/2020 Self Yes Yes Sig: Take 10 mg by mouth two (2) times a day. ibandronate (BONIVA) 150 mg tablet 3/2/2020 Self Yes Yes Sig: Take 150 mg by mouth every thirty (30) days. lactobacillus rhamnosus gg 10 billion cell (CULTURELLE) 10 billion cell capsule 3/22/2020 Self Yes Yes Sig: Take 1 Cap by mouth daily. magnesium oxide (MAG-OX) 400 mg tablet 3/22/2020 Self Yes Yes Sig: Take 400 mg by mouth nightly. metoprolol tartrate (LOPRESSOR) 25 mg tablet  Self Yes Yes Sig: Take 25 mg by mouth daily as needed. For systolic >284  
mirtazapine (REMERON) 15 mg tablet 3/22/2020 Self Yes Yes Sig: Take 15 mg by mouth nightly. mometasone-formoterol (DULERA) 200-5 mcg/actuation HFA inhaler 3/22/2020 Self Yes Yes Sig: Take 2 Puffs by inhalation two (2) times a day. potassium chloride SR (KLOR-CON 10) 10 mEq tablet 3/22/2020 Self Yes Yes Sig: Take 10 mEq by mouth two (2) times a day. predniSONE (DELTASONE) 10 mg tablet 3/22/2020 Self Yes Yes Sig: Take 10 mg by mouth two (2) times daily (with meals). promethazine (PHENERGAN) 25 mg tablet  Self Yes Yes Sig: Take 25 mg by mouth every eight (8) hours as needed for Nausea (Nausea not relieved by ondansetron). tiotropium (SPIRIVA WITH HANDIHALER) 18 mcg inhalation capsule 3/22/2020 Self Yes Yes Sig: Take 1 Cap by inhalation daily. traMADol (ULTRAM) 50 mg tablet 3/22/2020 Self Yes Yes Sig: Take 50 mg by mouth every six (6) hours as needed for Pain. Facility-Administered Medications: None Thank you, Charlie Gonzalez, PharmD, BCPS   Contact: [phone]

## 2020-03-24 NOTE — PROGRESS NOTES
JERONIMO Note: Pt was sleeping soundly despite calling her name repeatedly. Will return later for d/c planning. BALTA Kurtz

## 2020-03-24 NOTE — PROGRESS NOTES
Bedside shift change report given to Madeline Downs RN (oncoming nurse) by Krzysztof Spann RN (offgoing nurse). Report included the following information SBAR, Kardex, MAR, Accordion and Recent Results.

## 2020-03-24 NOTE — PROGRESS NOTES
2300 
Pt having nausea, however, it was better with last dose of zofran. It is currently ordered is every 6 hours. Dr. Frank Chau stated ok to change order to every 4 hours. Will continue to monitor. [facility] Bedside and Verbal shift change report given to Beau Apodaca RN (oncoming nurse) by Sania Brewer RN (offgoing nurse). Report included the following information SBAR, Kardex, Intake/Output, MAR, Recent Results and Med Rec Status.

## 2020-03-24 NOTE — PROGRESS NOTES
Occupational Therapy 10:01am 
OT orders received and appreciated. Pt chart reviewed, cleared by RN. Pt initially reporting \"I don't need occupational therapy or physical therapy\" but was receptive with encouragement. Pt willing to answer several questions about home set-up. However, pt then refused to continue when further mobility was attempted. Pt requested OT return in a bit - will reattempt. 11:20am 
Second attempt to see pt - pt refusing all options, repeatedly stating \"No\" to all questions. Will re-attempt OT evaluation tomorrow. Thank you.  
Lauren Loomis, OTR/L

## 2020-03-25 NOTE — PROGRESS NOTES
3/25/2020 
1:45 PM 
EMR reviewed, pt is continuing to require medical management for cellulitis, on IV ABx, ortho planning for CT to [facility]; for I & D Rt foot. PT/OT has evaluated recommending HH vs IPR pending progress. Transitions of Care Plan:    RUR   25 % High Risk 1. CM to follow through for final d/c plan 2. PT/OT recommending HH vs IPR pending progress,used [facility] previously 3. D/C when stable to home, pt resides at [address] [place] [address]  
4. Outpatient f/u PCP 
5. Family to transport at d/c 
Matt John

## 2020-03-25 NOTE — ROUTINE PROCESS
Bedside and Verbal shift change report given to Filemon Perkins RN (oncoming nurse) by Chiquita Ya RN (offgoing nurse). Report included the following information SBAR, Kardex and Quality Measures.

## 2020-03-25 NOTE — PROGRESS NOTES
Problem: Mobility Impaired (Adult and Pediatric) Goal: *Acute Goals and Plan of Care (Insert Text) Description: FUNCTIONAL STATUS PRIOR TO ADMISSION: Patient was modified independent using a rollator for functional mobility. HOME SUPPORT PRIOR TO ADMISSION: The patient lived alone with family to provide assistance. Physical Therapy Goals Initiated 3/25/2020 1. Patient will move from supine to sit and sit to supine  in bed with minimal assistance/contact guard assist within 7 day(s). 2.  Patient will transfer from bed to chair and chair to bed with minimal assistance/contact guard assist using the least restrictive device within 7 day(s). 3.  Patient will perform sit to stand with minimal assistance/contact guard assist within 7 day(s). 4.  Patient will ambulate with minimal assistance/contact guard assist for 50 feet with the least restrictive device within 7 day(s). Outcome: Progressing Towards Goal 
Note: PHYSICAL THERAPY EVALUATION Patient: Denisa Chadwick (28 y.o. female) Date: 3/25/2020 Primary Diagnosis: Cellulitis [L03.90] Cellulitis, leg [V84.383] Precautions: fall, contact ASSESSMENT Based on the objective data described below, the patient presents with decreased balance, ROM and strength with increased pain in LE following admission for cellulitis. Patient with self-limiting behaviors, very resistant to movement. Patient required MOD A for bed mobility, once in sitting tachypnenic and requires max cuing for pacing and pursed lip breathing. She repeatedly states \"I can't\". She was able to sit at edge of bed for 3 min duration before returning to supine. Currently very limited with her mobility and will recommend home health vs rehab depending on progress with mobility. Current Level of Function Impacting Discharge (mobility/balance): only able to sit at edge of bed with MOD A for bed mobility Functional Outcome Measure: The patient scored Total: 30/100 on the Barthel Index which is indicative of 60% impaired ability to care for basic self needs/dependency on others. Other factors to consider for discharge: lives at [address] [place] [address] Patient will benefit from skilled therapy intervention to address the above noted impairments. PLAN : 
Recommendations and Planned Interventions: bed mobility training, transfer training, gait training, therapeutic exercises, and therapeutic activities Frequency/Duration: Patient will be followed by physical therapy:  5 times a week to address goals. Recommendation for discharge: (in order for the patient to meet his/her long term goals) Therapy up to 5 days/week in SNF setting or intensive home health therapy program 
 
This discharge recommendation: A follow-up discussion with the attending provider and/or case management is planned IF patient discharges home will need the following DME: to be determined (TBD) and owns a rollator SUBJECTIVE:  
Patient stated i can't, I can't this is all I can do, I have to lay down.  OBJECTIVE DATA SUMMARY:  
HISTORY:   
Past Medical History:  
Diagnosis Date Asthma Atrial fibrillation ([facility] 11/16/2018 Autoimmune disease ([facility]   
 fibromyalgia CAD (coronary artery disease) 10/9/2015 Closed wedge compression fracture of T7 vertebra ([facility] 11/13/2018 Diabetes ([facility] DVT (deep vein thrombosis) in pregnancy GERD (gastroesophageal reflux disease) Heart failure ([facility] HTN (hypertension) NSTEMI (non-ST elevated myocardial infarction) ([facility] 10/9/2015 Obesity (BMI 30-39.9) 11/13/2018 Sleep apnea   
 wears CPAP Past Surgical History:  
Procedure Laterality Date ABDOMEN SURGERY PROC UNLISTED    
 hital hernia repair, gall bladder removed AMPUTATION TRANSMETACARPAL Right 06/12/2019  
 entire ring finger, 2nd & 3rd fingers (transmetacarpal) BREAST SURGERY PROCEDURE UNLISTED    
 lumpectomy CARDIAC SURG PROCEDURE UNLIST  10/9/15  
 2 stents [facility] HX COLOSTOMY    
 and reversal, post episiotomy repair [address] HX UROLOGICAL  2009  
 bladder sling Personal factors and/or comorbidities impacting plan of care: see above Home Situation Home Environment: Assisted living Care [facility]: [facility] - independent living Current DME Used/Available at Home: Clarissa Favia, rollator, Shower chair, Safety frame toliet, Raised toilet seat, Grab bars(daughter groceries, 2x/month cleaning, meals provided) Tub or Shower Type: Shower(with seat) EXAMINATION/PRESENTATION/DECISION MAKING:  
Critical Behavior: 
  
  
  
  
Hearing: Auditory Auditory Impairment: None Skin:  red, swollen legs, bilateral LE knee distal to toes Edema: +2 pitting edema Range Of Motion: 
AROM: Generally decreased, functional 
  
  
  
PROM: Generally decreased, functional 
  
  
  
Strength:   
Strength: Generally decreased, functional 
  
  
  
  
  
  
Tone & Sensation:  
Tone: Normal 
  
  
  
  
Sensation: Intact Coordination: 
Coordination: Generally decreased, functional 
Vision:  
  
Functional Mobility: 
Bed Mobility: 
Rolling: Minimum assistance Supine to Sit: Moderate assistance Sit to Supine: Moderate assistance Transfers: Therapeutic Exercises:  
 
 
Functional Measure: 
Barthel Index: 
 
Bathin Bladder: 5 Bowels: 10 
Groomin Dressin Feedin Mobility: 5 Stairs: 0 Toilet Use: 0 Transfer (Bed to Chair and Back): 5 Total: 30/100 The Barthel ADL Index: Guidelines 1. The index should be used as a record of what a patient does, not as a record of what a patient could do. 2. The main aim is to establish degree of independence from any help, physical or verbal, however minor and for whatever reason. 3. The need for supervision renders the patient not independent. 4. A patient's performance should be established using the best available evidence. Asking the patient, friends/relatives and nurses are the usual sources, but direct observation and common sense are also important. However direct testing is not needed. 5. Usually the patient's performance over the preceding 24-48 hours is important, but occasionally longer periods will be relevant. 6. Middle categories imply that the patient supplies over 50 per cent of the effort. 7. Use of aids to be independent is allowed. Markel Lopez., Barthel, D.W. (2602). Functional evaluation: the Barthel Index. [facility] (14)2. DAMI Page, Vito Casper., Guillermo Lazar., [place], [address] (1999). Measuring the change indisability after inpatient rehabilitation; comparison of the responsiveness of the Barthel Index and Functional [place] Measure. Journal of Neurology, Neurosurgery, and Psychiatry, 66(4), 469-012. Tatianna Carrasco, N.J.A, SANIYA Parry, & Tianna Quintanilla MALEYDA. (2004.) Assessment of post-stroke quality of life in cost-effectiveness studies: The usefulness of the Barthel Index and the EuroQoL-5D. [facility], 13, 948-83 Physical Therapy Evaluation Charge Determination History Examination Presentation Decision-Making HIGH Complexity :3+ comorbidities / personal factors will impact the outcome/ POC  MEDIUM Complexity : 3 Standardized tests and measures addressing body structure, function, activity limitation and / or participation in recreation  MEDIUM Complexity : Evolving with changing characteristics  Other outcome measures Barthel Index  MEDIUM Based on the above components, the patient evaluation is determined to be of the following complexity level: MEDIUM Pain Rating: 
Unable to state number, self limiting behaviors, but she states she is in pain and requests pain medicine Activity Tolerance: Fair 
Please refer to the flowsheet for vital signs taken during this treatment. After treatment patient left in no apparent distress:  
Supine in bed and Call bell within reach COMMUNICATION/EDUCATION:  
The patients plan of care was discussed with: Registered nurse. Fall prevention education was provided and the patient/caregiver indicated understanding., Patient/family have participated as able in goal setting and plan of care. , and Patient/family agree to work toward stated goals and plan of care. Thank you for this referral. 
Ann Angeles, PT, DPT Time Calculation: 16 mins

## 2020-03-25 NOTE — PROGRESS NOTES
Called Dr. Kera Dotson to give report on patient's worsening leg pain. Doctor said she will order pain medication.

## 2020-03-25 NOTE — PROGRESS NOTES
Daily Progress Note: 3/25/2020 [facility] London Herrera MD 
 
Assessment/Plan:  
Cellulitis of bilateral legs(3/23/2020). She was given Zosyn and clindamycin in ER.   
- Ancef and monitor clinically. If worsening may escalate antibiotics. - consult ID Lactic Acidosis/SIRS due to bilat LE cellulitis - Lactic acid 4.4 at admission  
- resolved (2.2 3/23) 
  
Diarrhea (3/23/2020)/ Abdominal pain (6/27/2018). Will check CT of the abdomen and send stool studies. Pain management and IV fluids for now 
  
Hyponatremia (3/23/2020)/ Hypokalemia (3/23/2020). This is most likely secondary to diarrhea. Replete potassium and continue normal saline IV fluids. 
  
CAD (coronary artery disease) (10/9/2015)/chronic Atrial fibrillation ([facility] (11/16/2018)/Essential hypertension (1/22/2016). Continue home medications and monitor on remote telemetry.  
  
Type II diabetes mellitus ([facility] (10/9/2015). Continue home medication and cover with sliding scale. Diabetic diet 
  
Sleep apnea (1/5/2019). Uses CPAP at home. May continue to use while she is here. OPD (chronic obstructive pulmonary disease) ([facility] (7/13/2019). Not wheezing stable. continue nebulizer treatments 
  
Severe obesity ([facility] (7/30/2019). Would benefit from weight loss. Hypokalemia 
- monitor and replace as needed   
 
Problem List: 
Problem List as of 3/25/2020 Date Reviewed: 8/1/2019 Codes Class Noted - Resolved * (Principal) Cellulitis ICD-10-CM: L03.90 ICD-9-CM: 682.9  3/23/2020 - Present Hypokalemia ICD-10-CM: E87.6 ICD-9-CM: 276.8  3/23/2020 - Present Hyponatremia ICD-10-CM: E87.1 ICD-9-CM: 276.1  3/23/2020 - Present Diarrhea ICD-10-CM: R19.7 ICD-9-CM: 787.91  3/23/2020 - Present Cellulitis, leg ICD-10-CM: L03.119 ICD-9-CM: 682.6  3/23/2020 - Present ASO (arteriosclerosis obliterans) ICD-10-CM: I70.90 ICD-9-CM: 440.9  9/5/2019 - Present PVD (peripheral vascular disease) ([facility] ICD-10-CM: I73.9 ICD-9-CM: 443.9  8/1/2019 - Present Severe obesity ([facility] ICD-10-CM: E66.01 
ICD-9-CM: 278.01  7/30/2019 - Present Syncope and collapse ICD-10-CM: R55 
ICD-9-CM: 780.2  7/13/2019 - Present UTI (urinary tract infection) ICD-10-CM: N39.0 ICD-9-CM: 599.0  7/13/2019 - Present COPD (chronic obstructive pulmonary disease) ([facility]) ICD-10-CM: J44.9 ICD-9-CM: 993  7/13/2019 - Present Sepsis ([facility] ICD-10-CM: A41.9 ICD-9-CM: 038.9, 995.91  7/13/2019 - Present Normocytic anemia ICD-10-CM: D64.9 ICD-9-CM: 285.9  7/13/2019 - Present PAD (peripheral artery disease) ([facility]) ICD-10-CM: I73.9 ICD-9-CM: 443.9  7/13/2019 - Present Leg wound, left ICD-10-CM: Q12.278S ICD-9-CM: 891.0  7/13/2019 - Present Leg laceration, right, initial encounter ICD-10-CM: A93.902P ICD-9-CM: 894.0  7/13/2019 - Present Cellulitis of right upper arm ICD-10-CM: L03.113 ICD-9-CM: 682.3  5/17/2019 - Present Mild intermittent asthma ICD-10-CM: J45.20 ICD-9-CM: 493.90  5/17/2019 - Present Gangrene of finger of right hand [facility]) ICD-10-CM: O97 
ICD-9-CM: 785.4  5/17/2019 - Present Brachial artery thrombus ([facility]) ICD-10-CM: M98.4 ICD-9-CM: 444.21  4/26/2019 - Present Bowel obstruction ([facility] ICD-10-CM: P52.221 ICD-9-CM: 560.9  1/8/2019 - Present Partial small bowel obstruction ([address] ICD-10-CM: K56.600 ICD-9-CM: 560.9  1/5/2019 - Present Sleep apnea ICD-10-CM: G47.30 ICD-9-CM: 780.57  1/5/2019 - Present Overview Signed 1/5/2019  8:41 PM by Diana Edmondson, DO  
  wears CPAP Atrial fibrillation [facility]) ICD-10-CM: I48.91 
ICD-9-CM: 427.31  11/16/2018 - Present Dyspnea ICD-10-CM: R06.00 
ICD-9-CM: 786.09  11/13/2018 - Present ARF (acute renal failure) (HCC) ICD-10-CM: N17.9 ICD-9-CM: 584.9  11/13/2018 - Present Obesity (BMI 30-39.9) (Chronic) ICD-10-CM: K91.0 ICD-9-CM: 278.00  11/13/2018 - Present Closed wedge compression fracture of T7 vertebra ([facility] ICD-10-CM: V56.525W ICD-9-CM: 805.2  11/13/2018 - Present Type 2 diabetes with nephropathy ([facility] ICD-10-CM: E11.21 
ICD-9-CM: 250.40, 583.81  10/1/2018 - Present Chest pain ICD-10-CM: R07.9 ICD-9-CM: 786.50  6/27/2018 - Present Abdominal pain ICD-10-CM: R10.9 ICD-9-CM: 789.00  6/27/2018 - Present Recurrent deep vein thrombosis (DVT) (HCC) ICD-10-CM: I82.409 ICD-9-CM: 453.40  3/30/2018 - Present Chronic anticoagulation (Chronic) ICD-10-CM: Z79.01 
ICD-9-CM: V58.61  3/30/2018 - Present Coronary artery disease involving native coronary artery without angina pectoris (Chronic) ICD-10-CM: I25.10 ICD-9-CM: 414.01  1/22/2016 - Present Essential hypertension (Chronic) ICD-10-CM: I10 
ICD-9-CM: 401.9  1/22/2016 - Present CAD (coronary artery disease) ICD-10-CM: I25.10 ICD-9-CM: 414.00  10/9/2015 - Present Type II diabetes mellitus (HCC) (Chronic) ICD-10-CM: E11.9 ICD-9-CM: 250.00  10/9/2015 - Present NSTEMI (non-ST elevated myocardial infarction) ([facility] ICD-10-CM: I21.4 ICD-9-CM: 410.70  10/9/2015 - Present RESOLVED: HTN (hypertension) ICD-10-CM: I10 
ICD-9-CM: 401.9  10/9/2015 - 1/22/2016 Subjective:  
  69 y.o.  female who is admitted with bilateral leg cellulitis. Ms. Yecenia Cook with past medical history of diabetes mellitus, CAD, GERD, DVT, hypertension, CAD, obesity, HAYES presented to ER complaining of abdominal pain, diarrhea, and poor appetite, which started about 2 weeks ago. The abdominal pain is generalized, sharp mild to moderate intensity associated with watery diarrhea. Last diarrhea was this morning which was watery. Denies fever. Patient has not been eating much the last 2 weeks. Denies shortness of breath, but has chronic cough.   Has been having bilateral leg swelling, redness and pain. (Dr Kelly Lujan 
 
3/24:  Feeling better this AM.  Nausea/vomiting resolved. No loose stools so far this AM.  Lactic acid back in normal range. WBC ok. Cultures neg so far. Tmax 99.4. K+ still low - replacing.  
 
3/25:  Feeling worse this AM.  Nausea has returned. C/O more pain in bilat LEs. Redness and heat in LEs has not improved. May add Vanc - will get ID to see and comment. Needs her home CPAP before giving more pain meds. K+ improving. Review of Systems: A comprehensive review of systems was negative except for that written in the HPI. Objective:  
Physical Exam:  
Visit Vitals /62 (BP 1 Location: Left arm, BP Patient Position: At rest) Pulse 100 Temp 98.3 °F (36.8 °C) Resp 18 Ht 5' 7\" (1.702 m) Wt 113.4 kg (250 lb) SpO2 97% BMI 39.16 kg/m² O2 Device: Room air Temp (24hrs), Av.3 °F (36.8 °C), Min:97.8 °F (36.6 °C), Max:98.6 °F (37 °C) No intake/output data recorded.  1901 -  0700 In: 260 [P.O.:260] Out: - General:  Alert, obese, cooperative, no distress, appears stated age. Head:  Normocephalic, without obvious abnormality, atraumatic. Eyes:  Conjunctivae/corneas clear. PERRL, EOMs intact. Throat: Lips, mucosa, and tongue moist.. Neck: Supple, symmetrical, trachea midline, no adenopathy, thyroid: no enlargement/tenderness/nodules, no carotid bruit and no JVD. Lungs:   Clear to auscultation bilaterally. Chest wall:  No tenderness or deformity. Heart:  Regular rate and rhythm, S1, S2 normal, no murmur, click, rub or gallop. Abdomen:   Soft, non-tender. Bowel sounds normal. No masses,  No organomegaly. Extremities: no cyanosis. 2+ LE edema. No calf tenderness or cords. Redness and tenderness ant tibial surfaces bilat. Still warm/hot over ant tib surfaces Skin:  turgor normal.   
Neurologic: CNII-XII intact. Alert and oriented X 3.   Fine motor of hands and fingers normal.   equal.  No cogwheeling or rigidity. Gait not tested at this time. Sensation grossly normal to touch. Gross motor of extremities normal.    
 
Data Review:  
 CT AB and Pelvis 3/23/20:   INDICATION: Abdominal pain, cough, shortness of breath. Exam: Noncontrast CT of the abdomen and pelvis is performed with 5 mm 
collimation. Sagittal and coronal reformatted images were also performed. CT dose reduction was achieved through the use of a standardized protocol 
tailored for this examination and automatic exposure control for dose 
modulation. Direct comparison is made to prior CT dated January 2019. FINDINGS: 
There is minimal bibasilar linear scarring. There is a small hiatal hernia, 
unchanged. Abdomen:  
Liver: There is diffuse fatty infiltration of the liver. Spleen: The spleen is normal on noncontrast images. Adrenals: The adrenals are normal on noncontrast images. Pancreas: The pancreas is normal on noncontrast images. Gallbladder: The gallbladder is surgically absent. Kidneys: The kidneys are atrophic bilaterally. The right kidney measures 7.4 cm 
in sagittal length. Left kidney measures 8.3 cm in sagittal length. There is no 
hydronephrosis. Bowel: No thickened or dilated loop of large or small bowel seen. Appendix: The appendix is normal. 
Pelvis: Urinary bladder is partially filled and grossly normal. 
Bones: The osseous structures are diffusely demineralized. T7 compression 
fracture and kyphoplasty post procedure changes are noted. Miscellaneous: There is a 5.1 cm x 4.5 cm low-attenuation, thin-walled fluid 
collection anterior to the left femoral vessels, not present on prior CT dated January 2019. There is no free intraperitoneal gas or fluid. There is no focal 
intraperitoneal fluid collection to suggest abscess. There is a small 4.5 cm x 
2.8 cm fat-containing periumbilical hernia. The uterus is absent.  
IMPRESSION:  
 1. 5.1 cm x 4.5 cm low-attenuation, thin-walled fluid collection anterior to the 
left femoral vessels, not present on prior CT dated January 2019. Finding likely 
represents a hematoma. Recommend clinical correlation. 2. Small hiatal hernia. 3. Hepatic steatosis. 4. No bowel obstruction, ileus or perforation. No intra-abdominal abscess. Recent Days: 
Recent Labs  
  03/25/20 
0346 03/24/20 
0410 03/23/20 
0815 WBC 7.1 7.4 8.3 HGB 11.9 11.8 13.6 HCT 38.4 38.4 44.3  297 336 Recent Labs  
  03/25/20 
0346 03/24/20 
0410 03/23/20 
0815 * 135* 134* K 3.4* 3.1* 2.9*  
 101 97 CO2 24 25 25 * 93 119* BUN 9 10 9 CREA 1.22* 1.21* 1.39* CA 6.9* 7.1* 8.2* ALB 1.8*  --  2.2* TBILI 0.5  --  0.9 SGOT 61*  --  70* ALT 16  --  35 No results for input(s): PH, PCO2, PO2, HCO3, FIO2 in the last 72 hours. 24 Hour Results: 
Recent Results (from the past 24 hour(s)) GLUCOSE, POC Collection Time: 03/24/20 11:28 AM  
Result Value Ref Range Glucose (POC) 74 65 - 100 mg/dL Performed by Bryant Flowers (PCT) GLUCOSE, POC Collection Time: 03/24/20  4:23 PM  
Result Value Ref Range Glucose (POC) 71 65 - 100 mg/dL Performed by Bryant Flowers (PCT) GLUCOSE, POC Collection Time: 03/24/20  9:21 PM  
Result Value Ref Range Glucose (POC) 57 (L) 65 - 100 mg/dL Performed by Laurita Winkler (PCT) GLUCOSE, POC Collection Time: 03/24/20  9:39 PM  
Result Value Ref Range Glucose (POC) 76 65 - 100 mg/dL Performed by Laurita Wnikler (PCT) GLUCOSE, POC Collection Time: 03/24/20 10:42 PM  
Result Value Ref Range Glucose (POC) 136 (H) 65 - 100 mg/dL Performed by [facility] METABOLIC PANEL, COMPREHENSIVE Collection Time: 03/25/20  3:46 AM  
Result Value Ref Range Sodium 134 (L) 136 - 145 mmol/L Potassium 3.4 (L) 3.5 - 5.1 mmol/L Chloride 102 97 - 108 mmol/L  
 CO2 24 21 - 32 mmol/L  Anion gap 8 5 - 15 mmol/L  
 Glucose 101 (H) 65 - 100 mg/dL BUN 9 6 - 20 MG/DL Creatinine 1.22 (H) 0.55 - 1.02 MG/DL  
 BUN/Creatinine ratio 7 (L) 12 - 20 GFR est AA 53 (L) >60 ml/min/1.73m2 GFR est non-AA 43 (L) >60 ml/min/1.73m2 Calcium 6.9 (L) 8.5 - 10.1 MG/DL Bilirubin, total 0.5 0.2 - 1.0 MG/DL  
 ALT (SGPT) 16 12 - 78 U/L  
 AST (SGOT) 61 (H) 15 - 37 U/L Alk. phosphatase 166 (H) 45 - 117 U/L Protein, total 5.3 (L) 6.4 - 8.2 g/dL Albumin 1.8 (L) 3.5 - 5.0 g/dL Globulin 3.5 2.0 - 4.0 g/dL A-G Ratio 0.5 (L) 1.1 - 2.2    
CBC WITH AUTOMATED DIFF Collection Time: 03/25/20  3:46 AM  
Result Value Ref Range WBC 7.1 3.6 - 11.0 K/uL  
 RBC 4.03 3.80 - 5.20 M/uL  
 HGB 11.9 11.5 - 16.0 g/dL HCT 38.4 35.0 - 47.0 % MCV 95.3 80.0 - 99.0 FL  
 MCH 29.5 26.0 - 34.0 PG  
 MCHC 31.0 30.0 - 36.5 g/dL  
 RDW 16.6 (H) 11.5 - 14.5 % PLATELET 521 880 - 389 K/uL MPV 10.2 8.9 - 12.9 FL  
 NRBC 0.0 0  WBC ABSOLUTE NRBC 0.00 0.00 - 0.01 K/uL NEUTROPHILS 63 32 - 75 % LYMPHOCYTES 19 12 - 49 % MONOCYTES 12 5 - 13 % EOSINOPHILS 4 0 - 7 % BASOPHILS 1 0 - 1 % IMMATURE GRANULOCYTES 1 (H) 0.0 - 0.5 % ABS. NEUTROPHILS 4.5 1.8 - 8.0 K/UL  
 ABS. LYMPHOCYTES 1.3 0.8 - 3.5 K/UL  
 ABS. MONOCYTES 0.9 0.0 - 1.0 K/UL  
 ABS. EOSINOPHILS 0.3 0.0 - 0.4 K/UL  
 ABS. BASOPHILS 0.1 0.0 - 0.1 K/UL  
 ABS. IMM. GRANS. 0.1 (H) 0.00 - 0.04 K/UL  
 DF AUTOMATED Medications reviewed Current Facility-Administered Medications Medication Dose Route Frequency  traMADoL (ULTRAM) tablet 50 mg  50 mg Oral Q6H PRN  potassium chloride SR (KLOR-CON 10) tablet 10 mEq  10 mEq Oral BID  sodium chloride (NS) flush 5-40 mL  5-40 mL IntraVENous Q8H  
 sodium chloride (NS) flush 5-40 mL  5-40 mL IntraVENous PRN  
 ceFAZolin (ANCEF) 2 g in sterile water (preservative free) 20 mL IV syringe  2 g IntraVENous Q8H  
 insulin lispro (HUMALOG) injection   SubCUTAneous AC&HS  
  glucose chewable tablet 16 g  4 Tab Oral PRN  
 glucagon (GLUCAGEN) injection 1 mg  1 mg IntraMUSCular PRN  
 dextrose 10% infusion 0-250 mL  0-250 mL IntraVENous PRN  
 0.9% sodium chloride with KCl 20 mEq/L infusion   IntraVENous CONTINUOUS  
 acetaminophen (TYLENOL) tablet 325 mg  325 mg Oral Q4H PRN  
 ondansetron (ZOFRAN) injection 4 mg  4 mg IntraVENous Q4H PRN Care Plan discussed with: Patient and Nurse Total time spent with patient: 30 minutes.  
Paramjit Novoa MD

## 2020-03-25 NOTE — CONSULTS
Orthopedic History and Physical  
 
Patient: Frank Gomez MRN: [id number]  SSN: [id number] YOB: 1945  Age: 69 y.o. Sex: female Subjective:  
 
Patient is a 69 y.o.  female who is admitted with bilateral leg cellulitis. Ms. Saúl Mendez with past medical history of diabetes mellitus, CAD, GERD, DVT, hypertension, CAD, obesity, HAYES presented to ER complaining of abdominal pain, diarrhea, and poor appetite, which started about 2 weeks ago. Pt's leg bilaterally have significant pain so much that she is having a difficult time ambulating. The pain is worse in the right vs the left. Pt lives independently at [facility]. Pt has been barely able to ambulate due to severe leg pain. Pt states she had two abscesses on her left anterior knee this month \"that exploded with pus\". She never started on abx. The abscesses have since scabbed over. Pt was started on IV abx for her cellulitis of bilateral LE. Pt remains afebrile. WBC normal. BLE dopplers done/ waiting for results. Dr. Joseph Sanders with ID consulted. Due to concern of severity of pain, ID consulted Ortho for evaluation. Patient Active Problem List  
 Diagnosis Date Noted  Cellulitis 03/23/2020  Hypokalemia 03/23/2020  Hyponatremia 03/23/2020  Diarrhea 03/23/2020  Cellulitis, leg 03/23/2020  ASO (arteriosclerosis obliterans) 09/05/2019  PVD (peripheral vascular disease) ([address] 08/01/2019  Severe obesity ([address] 07/30/2019  Syncope and collapse 07/13/2019  UTI (urinary tract infection) 07/13/2019  COPD (chronic obstructive pulmonary disease) ([address] 07/13/2019  Sepsis ([address] 07/13/2019  Normocytic anemia 07/13/2019  PAD (peripheral artery disease) ([address] 07/13/2019  Leg wound, left 07/13/2019  Leg laceration, right, initial encounter 07/13/2019  Cellulitis of right upper arm 05/17/2019  Mild intermittent asthma 05/17/2019  Gangrene of finger of right hand ([address] 05/17/2019  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/26/2019  Bowel obstruction ([address] 01/08/2019  Partial small bowel obstruction ([address] 01/05/2019  Sleep apnea 01/05/2019  Atrial fibrillation ([address] 11/16/2018  Dyspnea 11/13/2018  ARF (acute renal failure) ([address] 11/13/2018  Obesity (BMI 30-39.9) 11/13/2018  Closed wedge compression fracture of T7 vertebra ([address] 11/13/2018  Type 2 diabetes with nephropathy ([address] 10/01/2018  Chest pain 06/27/2018  Abdominal pain 06/27/2018  Recurrent deep vein thrombosis (DVT) ([address] 03/30/2018  Chronic anticoagulation 03/30/2018  Coronary artery disease involving native coronary artery without angina pectoris 01/22/2016  Essential hypertension 01/22/2016  CAD (coronary artery disease) 10/09/2015  Type II diabetes mellitus ([address] 10/09/2015  
 NSTEMI (non-ST elevated myocardial infarction) ([address] 10/09/2015 Past Medical History:  
Diagnosis Date  Asthma  Atrial fibrillation ([address] 11/16/2018  Autoimmune disease ([address]   
 fibromyalgia  CAD (coronary artery disease) 10/9/2015  Closed wedge compression fracture of T7 vertebra ([facility] 11/13/2018  Diabetes ([facility]  DVT (deep vein thrombosis) in pregnancy  GERD (gastroesophageal reflux disease)  Heart failure ([address]  HTN (hypertension)  NSTEMI (non-ST elevated myocardial infarction) ([facility] 10/9/2015  Obesity (BMI 30-39.9) 11/13/2018  Sleep apnea   
 wears CPAP Past Surgical History:  
Procedure Laterality Date  ABDOMEN SURGERY PROC UNLISTED    
 hital hernia repair, gall bladder removed  AMPUTATION TRANSMETACARPAL Right 06/12/2019  
 entire ring finger, 2nd & 3rd fingers (transmetacarpal)  BREAST SURGERY PROCEDURE UNLISTED    
 lumpectomy  CARDIAC SURG PROCEDURE UNLIST  10/9/15  
 2 stents [place]  HX COLOSTOMY    
 and reversal, post episiotomy repair [address] [facility] UROLOGICAL  2009  
 bladder sling Prior to Admission medications Medication Sig Start Date End Date Taking? Authorizing Provider  
aspirin delayed-release 81 mg tablet Take 81 mg by mouth daily. Yes Provider, Historical  
ferrous sulfate (SLOW FE) 142 mg (45 mg iron) ER tablet Take 142 mg by mouth Daily (before breakfast). Yes Provider, Historical  
traMADol (ULTRAM) 50 mg tablet Take 50 mg by mouth every six (6) hours as needed for Pain. Yes Provider, Historical  
metoprolol tartrate (LOPRESSOR) 25 mg tablet Take 25 mg by mouth daily as needed. For systolic >636   Yes Provider, Historical  
glipiZIDE SR (GLUCOTROL XL) 10 mg CR tablet Take 10 mg by mouth two (2) times a day. Yes Provider, Historical  
magnesium oxide (MAG-OX) 400 mg tablet Take 400 mg by mouth nightly. Yes Provider, Historical  
potassium chloride SR (KLOR-CON 10) 10 mEq tablet Take 10 mEq by mouth two (2) times a day. Yes Provider, Historical  
predniSONE (DELTASONE) 10 mg tablet Take 10 mg by mouth two (2) times daily (with meals). Yes Provider, Historical  
apixaban (ELIQUIS) 5 mg tablet Take 1 Tab by mouth two (2) times a day. 5/24/19  Yes Ralph Kelsey MD  
Omeprazole delayed release (PRILOSEC D/R) 20 mg tablet Take 20 mg by mouth two (2) times a day. Yes Provider, Historical  
ibandronate (BONIVA) 150 mg tablet Take 150 mg by mouth every thirty (30) days. Yes Provider, Historical  
promethazine (PHENERGAN) 25 mg tablet Take 25 mg by mouth every eight (8) hours as needed for Nausea (Nausea not relieved by ondansetron). Yes Provider, Historical  
mirtazapine (REMERON) 15 mg tablet Take 15 mg by mouth nightly. Yes Provider, Historical  
tiotropium (SPIRIVA WITH HANDIHALER) 18 mcg inhalation capsule Take 1 Cap by inhalation daily.    Yes Provider, Historical  
albuterol (PROVENTIL VENTOLIN) 2.5 mg /3 mL (0.083 %) nebulizer solution 2.5 mg by Nebulization route every six (6) hours as needed for Wheezing or Shortness of Breath. Yes Provider, Historical  
albuterol (PROAIR HFA) 90 mcg/actuation inhaler Take 2 Puffs by inhalation every four (4) hours as needed. Yes Provider, Historical  
lactobacillus rhamnosus gg 10 billion cell (CULTURELLE) 10 billion cell capsule Take 1 Cap by mouth daily. Yes Provider, Historical  
biotin 10,000 mcg cap Take 1 Cap by mouth daily. Yes Provider, Historical  
DULoxetine (CYMBALTA) 60 mg capsule Take 60 mg by mouth daily. Yes Provider, Historical  
cholecalciferol, vitamin D3, (Vitamin D3) 50 mcg (2,000 unit) tab Take 2,000 Units by mouth daily. Yes Provider, Historical  
azelastine-fluticasone (DYMISTA) 137-50 mcg/spray spry 2 Sprays by Nasal route daily. Yes Provider, Historical  
mometasone-formoterol (DULERA) 200-5 mcg/actuation HFA inhaler Take 2 Puffs by inhalation two (2) times a day. Yes Provider, Historical  
 
Current Facility-Administered Medications Medication Dose Route Frequency  traMADoL (ULTRAM) tablet 50 mg  50 mg Oral Q6H PRN  
 [START ON 3/26/2020] aspirin delayed-release tablet 81 mg  81 mg Oral DAILY  [START ON 3/26/2020] DULoxetine (CYMBALTA) capsule 60 mg  60 mg Oral DAILY  [START ON 3/26/2020] ferrous sulfate (SLOW FE) ER tablet 142 mg (Patient Supplied)  142 mg Oral ACB  mirtazapine (REMERON) tablet 15 mg  15 mg Oral QHS  [START ON 3/26/2020] pantoprazole (PROTONIX) tablet 40 mg  40 mg Oral ACB  DAPTOmycin (CUBICIN) 680.4 mg in 0.9% sodium chloride 13.608 mL IV Syringe  6 mg/kg IntraVENous Q24H  cefepime (MAXIPIME) 2 g in 0.9% sodium chloride (MBP/ADV) 100 mL  2 g IntraVENous Q12H  potassium chloride SR (KLOR-CON 10) tablet 10 mEq  10 mEq Oral BID  sodium chloride (NS) flush 5-40 mL  5-40 mL IntraVENous Q8H  
 sodium chloride (NS) flush 5-40 mL  5-40 mL IntraVENous PRN  
 insulin lispro (HUMALOG) injection   SubCUTAneous AC&HS  
  glucose chewable tablet 16 g  4 Tab Oral PRN  
 glucagon (GLUCAGEN) injection 1 mg  1 mg IntraMUSCular PRN  
 dextrose 10% infusion 0-250 mL  0-250 mL IntraVENous PRN  
 0.9% sodium chloride with KCl 20 mEq/L infusion   IntraVENous CONTINUOUS  
 acetaminophen (TYLENOL) tablet 325 mg  325 mg Oral Q4H PRN  
 ondansetron (ZOFRAN) injection 4 mg  4 mg IntraVENous Q4H PRN Allergies Allergen Reactions  Advair Diskus [Fluticasone Propion-Salmeterol] Rash  Aspartame Other (comments)  Breo Ellipta [Fluticasone Furoate-Vilanterol] Rash  Bumex [Bumetanide] Myalgia  Ciprofloxacin Rash  Statins-Hmg-Coa Reductase Inhibitors Other (comments) Muscle pain Lipitor/crestor/zocor  Sulfa (Sulfonamide Antibiotics) Rash  Tetracycline Other (comments) musclepain  Torsemide Myalgia  Zetia [Ezetimibe] Myalgia Social History Tobacco Use  Smoking status: Former Smoker Last attempt to quit: 3/30/2017 Years since quittin.9  Smokeless tobacco: Never Used Substance Use Topics  Alcohol use: No  
  Alcohol/week: 0.0 standard drinks Comment: very very rarely Family History Problem Relation Age of Onset  Diabetes Mother  Heart Failure Mother  Kidney Disease Mother  Diabetes Father  Heart Disease Father  Elevated Lipids Father  Heart Failure Father  Heart Disease Brother  Cancer Brother   
     kidney  Diabetes Brother  No Known Problems Brother  No Known Problems Brother Review of Systems A comprehensive review of systems was negative except for that written in the HPI. Objective:  
 
No data found. Temp (24hrs), Av.3 °F (36.8 °C), Min:97.8 °F (36.6 °C), Max:98.6 °F (37 °C) EXAM:  
Physical Exam:  
Patient appears generally well, mood and affect are appropriate and pleasant. HEENT: Normocephalic, atraumatic.   
Extremities: Bilateral LE with significant swelling (3+ pitting edema from knees to toes especially on right ). Scattered erythema / redness throughout. Marked tenderness with palpation of LE/ especially right foot. Dorsum of right foot with area of prominence about the size of hockey puck / erythematous. Marked tenderness to palpation. Slightly fluctuant. Able to wiggle toes. Left anterior knee with 2 areas of scabbed over areas. No erythema surrounding. Skin discoloration could be secondary to PVD Vascular: 2+ dorsalis pedis pulses bilaterally. Imaging Review CT pending. Labs:  
Recent Results (from the past 12 hour(s)) METABOLIC PANEL, COMPREHENSIVE Collection Time: 03/25/20  3:46 AM  
Result Value Ref Range Sodium 134 (L) 136 - 145 mmol/L Potassium 3.4 (L) 3.5 - 5.1 mmol/L Chloride 102 97 - 108 mmol/L  
 CO2 24 21 - 32 mmol/L Anion gap 8 5 - 15 mmol/L Glucose 101 (H) 65 - 100 mg/dL BUN 9 6 - 20 MG/DL Creatinine 1.22 (H) 0.55 - 1.02 MG/DL  
 BUN/Creatinine ratio 7 (L) 12 - 20 GFR est AA 53 (L) >60 ml/min/1.73m2 GFR est non-AA 43 (L) >60 ml/min/1.73m2 Calcium 6.9 (L) 8.5 - 10.1 MG/DL Bilirubin, total 0.5 0.2 - 1.0 MG/DL  
 ALT (SGPT) 16 12 - 78 U/L  
 AST (SGOT) 61 (H) 15 - 37 U/L Alk. phosphatase 166 (H) 45 - 117 U/L Protein, total 5.3 (L) 6.4 - 8.2 g/dL Albumin 1.8 (L) 3.5 - 5.0 g/dL Globulin 3.5 2.0 - 4.0 g/dL A-G Ratio 0.5 (L) 1.1 - 2.2    
CBC WITH AUTOMATED DIFF Collection Time: 03/25/20  3:46 AM  
Result Value Ref Range WBC 7.1 3.6 - 11.0 K/uL  
 RBC 4.03 3.80 - 5.20 M/uL  
 HGB 11.9 11.5 - 16.0 g/dL HCT 38.4 35.0 - 47.0 % MCV 95.3 80.0 - 99.0 FL  
 MCH 29.5 26.0 - 34.0 PG  
 MCHC 31.0 30.0 - 36.5 g/dL  
 RDW 16.6 (H) 11.5 - 14.5 % PLATELET 715 731 - 622 K/uL MPV 10.2 8.9 - 12.9 FL  
 NRBC 0.0 0  WBC ABSOLUTE NRBC 0.00 0.00 - 0.01 K/uL NEUTROPHILS 63 32 - 75 % LYMPHOCYTES 19 12 - 49 % MONOCYTES 12 5 - 13 % EOSINOPHILS 4 0 - 7 % BASOPHILS 1 0 - 1 % IMMATURE GRANULOCYTES 1 (H) 0.0 - 0.5 % ABS. NEUTROPHILS 4.5 1.8 - 8.0 K/UL  
 ABS. LYMPHOCYTES 1.3 0.8 - 3.5 K/UL  
 ABS. MONOCYTES 0.9 0.0 - 1.0 K/UL  
 ABS. EOSINOPHILS 0.3 0.0 - 0.4 K/UL  
 ABS. BASOPHILS 0.1 0.0 - 0.1 K/UL  
 ABS. IMM. GRANS. 0.1 (H) 0.00 - 0.04 K/UL  
 DF AUTOMATED    
GLUCOSE, POC Collection Time: 03/25/20  7:43 AM  
Result Value Ref Range Glucose (POC) 92 65 - 100 mg/dL Performed by Guerline Silva (PCT) GLUCOSE, POC Collection Time: 03/25/20 11:30 AM  
Result Value Ref Range Glucose (POC) 79 65 - 100 mg/dL Performed by Guerline Silva (PCT) GLUCOSE, POC Collection Time: 03/25/20 11:58 AM  
Result Value Ref Range Glucose (POC) 92 65 - 100 mg/dL Performed by Guerline Silva (PCT) Assessment:  
 
Patient Active Problem List  
 Diagnosis Date Noted  Cellulitis 03/23/2020  Hypokalemia 03/23/2020  Hyponatremia 03/23/2020  Diarrhea 03/23/2020  Cellulitis, leg 03/23/2020  ASO (arteriosclerosis obliterans) 09/05/2019  PVD (peripheral vascular disease) [address] 08/01/2019  Severe obesity ([address] 07/30/2019  Syncope and collapse 07/13/2019  UTI (urinary tract infection) 07/13/2019  COPD (chronic obstructive pulmonary disease) ([address] 07/13/2019  Sepsis ([address] 07/13/2019  Normocytic anemia 07/13/2019  PAD (peripheral artery disease) ([address] 07/13/2019  Leg wound, left 07/13/2019  Leg laceration, right, initial encounter 07/13/2019  Cellulitis of right upper arm 05/17/2019  Mild intermittent asthma 05/17/2019  Gangrene of finger of right hand ([address] 05/17/2019  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/26/2019  Bowel obstruction ([address] 01/08/2019  Partial small bowel obstruction ([address] 01/05/2019  Sleep apnea 01/05/2019  Atrial fibrillation ([facility] 11/16/2018  Dyspnea 11/13/2018  ARF (acute renal failure) ([facility] 11/13/2018  Obesity (BMI 30-39.9) 11/13/2018  Closed wedge compression fracture of T7 vertebra ([facility] 11/13/2018  Type 2 diabetes with nephropathy ([facility] 10/01/2018  Chest pain 06/27/2018  Abdominal pain 06/27/2018  Recurrent deep vein thrombosis (DVT) ([facility] 03/30/2018  Chronic anticoagulation 03/30/2018  Coronary artery disease involving native coronary artery without angina pectoris 01/22/2016  Essential hypertension 01/22/2016  CAD (coronary artery disease) 10/09/2015  Type II diabetes mellitus ([facility] 10/09/2015  
 NSTEMI (non-ST elevated myocardial infarction) ([facility] 10/09/2015 Plan:  
69year old admitted with BLE cellulitis on IV abx with concomitant chronic PVD Concerns for increase in pain/ right foot swelling with area of concern of right dorsum of foot. Discussed with Dr. Jaylin Frazier agree with CT with contrast to eval for abscess/ drainage fluid accumulation. Will discuss with Dr. Nikhil Ray to continue to follow along. Rachel Guerrero, MELINDA Orthopaedic Surgery Nurse Practitioner

## 2020-03-25 NOTE — CONSULTS
[address] [facility] Name:  Breezy Rosario 
MR#:  [id number] :  1945 ACCOUNT #:  [de-identified] DATE OF SERVICE:  2020 CHIEF COMPLAINT:  Bilateral lower extremity pain, and diarrhea. HISTORY OF PRESENT ILLNESS:  The patient is a 27-year-old female with past medical history significant for diabetes mellitus, coronary artery disease, gastroesophageal reflux disease, who was admitted to [facility] with the aforementioned complaint. The patient states that she developed abdominal pain, diarrhea, poor appetite approximately 2 weeks prior to admission. She has also developed bilateral lower extremity swelling, redness, and pain over the last 2-3 weeks. This has progressed to the point where she is unable to weightbear on the right leg. Workup in the Emergency Department found her to be afebrile with normal white blood cell count; however, her lactic acid was elevated at 4.4. She has been started on cefazolin. There has been no improvement in the right leg pain. The Infectious Diseases Service has been asked to assist with antibiotic management. PAST MEDICAL HISTORY:  Asthma, atrial fibrillation, fibromyalgia, coronary artery disease, compression fracture of T7 vertebra, diabetes mellitus, DVT, gastroesophageal reflux disease, heart failure, hypertension, non-ST-elevation MI, obesity, obstructive sleep apnea on CPAP. PAST SURGICAL HISTORY:  Hiatal hernia repair, cholecystectomy, partial amputation of the right second and third fingers and total amputation of the fourth finger on the right, lumpectomy, cardiac stents, , colostomy with reversal hysterectomy, bladder sling. SOCIAL HISTORY:  No alcohol, tobacco or illicit drug use. FAMILY HISTORY:  Diabetes mellitus. ALLERGIES:  CIPRO CAUSED A RASH, SULFA DRUGS CAUSED A RASH, AND TETRACYCLINE CAUSES MUSCLE PAIN. OUTPATIENT MEDICATIONS:  Please see body of chart for details. She is not on antibiotics prior to admission. REVIEW OF SYSTEMS:  As per the HPI. Remainder of 10-system review of systems is unremarkable. LABORATORY DATA:  White blood cell count 7100, platelet count 625,601. Creatinine is 1.2, AST 61, ALT 16, alkaline phosphatase 166, total bilirubin is 0.5. Blood cultures reveal no growth to date. CT scan of the abdomen and pelvis reveals possible hematoma in the left groin. Small hiatal hernia. Hepatic steatosis. No bowel obstruction, ileus, or perforation. No intra-abdominal abscess. PHYSICAL EXAMINATION: 
VITAL SIGNS:  Temperature 98.5 degrees Fahrenheit, maximum temperature is less than 100, heart rate 108 beats per minute, blood pressure 111/51, respiratory rate 18, oxygen saturation 98% on room air. GENERAL:  Alert, in no acute distress. HEENT:  Normocephalic, atraumatic. Mucous membranes moist. 
NECK:  Supple. HEART:  Regular rate, rhythm. No murmurs, gallops, rubs. PULMONARY:  Clear to auscultation anteriorly. ABDOMEN:  Obese, mildly tender to palpation. No guarding, no rebound. Positive bowel sounds. EXTREMITIES:  Bilateral lower extremity pitting edema. Scattered scabbed areas in various states of healing. No erythema noted. There is some chronic discoloration of the legs and no obvious erythema. The right leg is tender to palpation and warm to touch. There is a fluid collection on the dorsal aspect of the right foot which is extremely tender to palpation. No purulence or induration noted. Induration is from the wounds. SKIN:  No rashes. PSYCHIATRIC:  Normal affect. NEUROLOGIC:  Cranial nerves II-XII grossly intact. No focal deficits. ASSESSMENT AND PLAN: 
1. Questionable right lower extremity cellulitis. The patient was afebrile with a normal white count at the time of admission. The leg is not erythematous, but it is tender to palpation and warm to touch.   We will obtain a CT scan. Unfortunately, this will be limited without contrast due to chronic kidney injury. We will ask Ortho to see for further input. Antibiotics will be broadened to vancomycin and cefepime. 2.  Chronic kidney disease. This is stable. Continue to follow. 3.  History of Cipro, sulfa, and tetracycline allergies. 4.  Diarrhea. CT scan of the abdomen and pelvis was unremarkable. Enteric pathogens panel was negative as well. Very low suspicion for Clostridium difficile at this time. Continue to follow. 5.  Diabetes mellitus. Hemoglobin A1c from 05/2019 was 7.7. Thank you for allowing me to participate in the care of this patient. [address] DO KARELY 
 
 
[id number] 
D:  03/25/2020 11:55 
T:  03/25/2020 12:28 
JOB #:  [id number]

## 2020-03-25 NOTE — PROGRESS NOTES
Problem: Self Care Deficits Care Plan (Adult) Goal: *Acute Goals and Plan of Care (Insert Text) Description:  
FUNCTIONAL STATUS PRIOR TO ADMISSION: Patient was modified independent using a rollator for functional mobility. HOME SUPPORT: The patient lived alone with daughter and son-in-law to provide assistance. Pt's daughter assists with groceries, meals and house cleaning 2x a week. Occupational Therapy Goals Initiated 3/25/2020 1. Patient will perform lower body dressing with minimal assistance/contact guard assist within 7 day(s). 2.  Patient will perform toilet transfers with moderate assistance using RW within 7 day(s). 3.  Patient will perform all aspects of toileting with moderate assistance  within 7 day(s). 4.  Patient will participate in upper extremity therapeutic exercise/activities with modified independence for 10 minutes within 7 day(s). 5.  Patient will utilize energy conservation techniques during functional activities with verbal and visual cues within 7 day(s). Outcome: Progressing Towards Goal 
  
OCCUPATIONAL THERAPY EVALUATION Patient: Mathew Burns (66 y.o. female) Date: 3/25/2020 Primary Diagnosis: Cellulitis [L03.90] Cellulitis, leg [D75.266] Precautions: Contact, Fall ASSESSMENT Based on the objective data described below, the patient presents with 10/10 BLE pain R > L, decreased strength, endurance, mobility, balance, sensation B hands and feet due to neuropathy and safety following admission for BLE cellulitis. Pt tachepenic during evaluation requiring contestant education on PLB and relaxation. Prior to admission she was able to amb with rollator and live alone with minimal help. Currently she requires significant physical assist for ADLs and mobility. Current Level of Function Impacting Discharge (ADLs/self-care): total A LE ADLs, total A toileting, mod A bed mobility and only able to tolerate 3 minutes EOB sitting Functional Outcome Measure: The patient scored Total: 40/100 on the Barthel Index outcome measure. Other factors to consider for discharge: pt lives [place] and wants to return to her [address] Patient will benefit from skilled therapy intervention to address the above noted impairments. PLAN : 
Recommendations and Planned Interventions: self care training, functional mobility training, therapeutic exercise, balance training, therapeutic activities, endurance activities, patient education, home safety training and family training/education Frequency/Duration: Patient will be followed by occupational therapy 5 times a week to address goals. Recommendation for discharge: (in order for the patient to meet his/her long term goals) To be determined: [facility] vs rehab depending on progress This discharge recommendation: 
Has not yet been discussed the attending provider and/or case management IF patient discharges home will need the following DME: TBD SUBJECTIVE:  
Patient stated I hurt so bad and am sick to my stomach.  OBJECTIVE DATA SUMMARY:  
HISTORY:  
Past Medical History:  
Diagnosis Date  Asthma  Atrial fibrillation ([facility] [address] 11/16/2018  Autoimmune disease ([facility] [address]   
 fibromyalgia  CAD (coronary artery disease) 10/9/2015  Closed wedge compression fracture of T7 vertebra ([facility] [address] 11/13/2018  Diabetes ([facility] [address]  DVT (deep vein thrombosis) in pregnancy  GERD (gastroesophageal reflux disease)  Heart failure ([facility] [address]  HTN (hypertension)  NSTEMI (non-ST elevated myocardial infarction) ([facility] [address] 10/9/2015  Obesity (BMI 30-39.9) 11/13/2018  Sleep apnea   
 wears CPAP Past Surgical History:  
Procedure Laterality Date  ABDOMEN SURGERY PROC UNLISTED    
 hital hernia repair, gall bladder removed  AMPUTATION TRANSMETACARPAL Right 06/12/2019  
 entire ring finger, 2nd & 3rd fingers (transmetacarpal)  BREAST SURGERY PROCEDURE UNLISTED    
 lumpectomy  CARDIAC SURG PROCEDURE UNLIST  10/9/15  
 2 stents [place]  HX COLOSTOMY    
 and reversal, post episiotomy repair [facility]  HX UROLOGICAL  2009  
 bladder sling Expanded or extensive additional review of patient history:  
 
Home Situation Home Environment: Independent living Care [facility] # Steps to Enter: 0 One/Two Story Residence: One story Living Alone: Yes Support Systems: Child(helio) Patient Expects to be Discharged to[de-identified] Independent living facility Current DME Used/Available at Home: [employer], rollator, Shower chair, Safety frame toliet, Raised toilet seat, Grab bars(daughter groceries, 2x/month cleaning, meals provided) Tub or Shower Type: Shower(built in seat) Hand dominance: Right EXAMINATION OF PERFORMANCE DEFICITS: 
Cognitive/Behavioral Status: 
Neurologic State: Alert; Appropriate for age Orientation Level: Oriented X4 Cognition: Appropriate decision making; Appropriate for age attention/concentration; Follows commands Perception: Appears intact Perseveration: No perseveration noted Safety/Judgement: Awareness of environment; Fall prevention; Insight into deficits Hearing: Auditory Auditory Impairment: None Vision/Perceptual:   
Acuity: Within Defined Limits Corrective Lenses: Reading glasses Range of Motion: 
AROM: Generally decreased, functional 
PROM: Generally decreased, functional 
  
  
  
  
  
  
 
Strength: 
Strength: Generally decreased, functional 
  
  
  
  
 
Coordination: 
Coordination: Generally decreased, functional- diabetic neuropathy and multiple digit amputations RUE Fine Motor Skills-Upper: Left Impaired;Right Impaired Gross Motor Skills-Upper: Left Intact; Right Intact Tone & Sensation: 
Tone: Normal 
Sensation: Impaired(B hand and feet due to diabetic neuropathy) Balance: 
Sitting: Impaired(due to pain) Sitting - Static: Fair (occasional) Sitting - Dynamic: Fair (occasional) Standing: (declined standing attempt due to 10/10 pain) Functional Mobility and Transfers for ADLs: 
Bed Mobility: 
Rolling: Minimum assistance Supine to Sit: Moderate assistance Sit to Supine: Moderate assistance Scooting: Moderate assistance; Additional time;Assist x1 Transfers: 
Sit to Stand: (declined standing attempt due to 10/10 pain) Bed to Chair: Total assistance(declined standing attempt due to 10/10 pain) Toilet Transfer : Total assistance(declined standing attempt due to 10/10 pain to [facility]) ADL Assessment and Intervention: 
Feeding: Modified independent(but no appetite) Oral Facial Hygiene/Grooming: Setup; Additional time(seated supported in bed) Bathing: Moderate assistance; Additional time;Assist x1(seated supported in bed- A to reach buttocks, feet, balance) Upper Body Dressing: Setup; Additional time(seated supported in bed) Lower Body Dressing: Total assistance Toileting: Maximum assistance Cognitive Retraining Safety/Judgement: Awareness of environment; Fall prevention; Insight into deficits Functional Measure: 
Barthel Index: 
 
Bathin Bladder: 5 Bowels: 10 
Groomin Dressin Feeding: 10 Mobility: 0 Stairs: 0 Toilet Use: 0 Transfer (Bed to Chair and Back): 5 Total: 40/100 The Barthel ADL Index: Guidelines 1. The index should be used as a record of what a patient does, not as a record of what a patient could do. 2. The main aim is to establish degree of independence from any help, physical or verbal, however minor and for whatever reason. 3. The need for supervision renders the patient not independent. 4. A patient's performance should be established using the best available evidence. Asking the patient, friends/relatives and nurses are the usual sources, but direct observation and common sense are also important. However direct testing is not needed. 5. Usually the patient's performance over the preceding 24-48 hours is important, but occasionally longer periods will be relevant. 6. Middle categories imply that the patient supplies over 50 per cent of the effort. 7. Use of aids to be independent is allowed. Rohan Joyner., Barthel, D.W. ([id number]). Functional evaluation: the Barthel Index. [address] [facility] (14)2. DAMI Mccormick, Khloe Daniel, Aissatou Bryson., [facility], [address] [facility] (1999). Measuring the change indisability after inpatient rehabilitation; comparison of the responsiveness of the Barthel Index and Functional [place] Measure. Journal of Neurology, Neurosurgery, and Psychiatry, 66(4), 918-740. АЛЕКСАНДР Christensen.ELIZ, SANIYA Parry, & Shania Mitchell MALEYDA. (2004.) Assessment of post-stroke quality of life in cost-effectiveness studies: The usefulness of the Barthel Index and the EuroQoL-5D. [facility], 13, 181-97 Occupational Therapy Evaluation Charge Determination History Examination Decision-Making LOW Complexity : Brief history review  HIGH Complexity : 5 or more performance deficits relating to physical, cognitive , or psychosocial skils that result in activity limitations and / or participation restrictions MEDIUM Complexity : Patient may present with comorbidities that affect occupational performnce. Miniml to moderate modification of tasks or assistance (eg, physical or verbal ) with assesment(s) is necessary to enable patient to complete evaluation Based on the above components, the patient evaluation is determined to be of the following complexity level: LOW Pain Rating: 
10/10 Activity Tolerance:  
Poor, requires frequent rest breaks and observed SOB with activity Please refer to the flowsheet for vital signs taken during this treatment. After treatment patient left in no apparent distress:   
Supine in bed and Call bell within reach COMMUNICATION/EDUCATION:  
 The patients plan of care was discussed with: Physical therapist and Registered nurse. Home safety education was provided and the patient/caregiver indicated understanding., Patient/family have participated as able in goal setting and plan of care. and Patient/family agree to work toward stated goals and plan of care. This patients plan of care is appropriate for delegation to [facility]. Thank you for this referral. 
Virgen Martin, OT Time Calculation: 21 mins

## 2020-03-26 NOTE — PROGRESS NOTES
Bedside and Verbal shift change report given to Hermina Paget (oncoming nurse) by Manisha Romo (offgoing nurse). Report included the following information SBAR, Kardex, Intake/Output, MAR, Recent Results and Cardiac Rhythm ST.

## 2020-03-26 NOTE — PROGRESS NOTES
Bedside shift change report given to NathanaelRN (oncoming nurse) by Kristian Lim (offgoing nurse). Report included the following information SBAR and MAR.

## 2020-03-26 NOTE — PROGRESS NOTES
Daily Progress Note: 3/26/2020 Angelica Barber MD 
 
Assessment/Plan:  
Cellulitis of bilateral legs(3/23/2020). She was given Zosyn and clindamycin in ER.   
- consulted ID and ID broadened antibiotics to Cefepime and Vanc 3/25 Lactic Acidosis/SIRS due to bilat LE cellulitis - Lactic acid 4.4 at admission  
- resolved (2.2 3/23) 
  
Diarrhea (3/23/2020)/ Abdominal pain (6/27/2018). Will check CT of the abdomen and send stool studies. Pain management and IV fluids for now 
  
Hyponatremia (3/23/2020)/ Hypokalemia (3/23/2020). This is most likely secondary to diarrhea. Replete potassium and continue normal saline IV fluids. 
  
CAD (coronary artery disease) (10/9/2015)/chronic Atrial fibrillation ([facility] (11/16/2018)/Essential hypertension (1/22/2016). Continue home medications and monitor on remote telemetry.  
  
Type II diabetes mellitus ([facility] (10/9/2015). Continue home medication and cover with sliding scale. Diabetic diet 
  
Sleep apnea (1/5/2019). Uses CPAP at home. May continue to use while she is here. OPD (chronic obstructive pulmonary disease) ([facility] (7/13/2019). Not wheezing stable. continue nebulizer treatments 
  
Severe obesity ([facility] (7/30/2019). Would benefit from weight loss. Hypokalemia 
- monitor and replace as needed   
 
Problem List: 
Problem List as of 3/26/2020 Date Reviewed: 8/1/2019 Codes Class Noted - Resolved * (Principal) Cellulitis ICD-10-CM: L03.90 ICD-9-CM: 682.9  3/23/2020 - Present Hypokalemia ICD-10-CM: E87.6 ICD-9-CM: 276.8  3/23/2020 - Present Hyponatremia ICD-10-CM: E87.1 ICD-9-CM: 276.1  3/23/2020 - Present Diarrhea ICD-10-CM: R19.7 ICD-9-CM: 787.91  3/23/2020 - Present Cellulitis, leg ICD-10-CM: L03.119 ICD-9-CM: 682.6  3/23/2020 - Present ASO (arteriosclerosis obliterans) ICD-10-CM: I70.90 ICD-9-CM: 440.9  9/5/2019 - Present PVD (peripheral vascular disease) ([facility] ICD-10-CM: I73.9 ICD-9-CM: 443.9  8/1/2019 - Present Severe obesity ([facility] ICD-10-CM: E66.01 
ICD-9-CM: 278.01  7/30/2019 - Present Syncope and collapse ICD-10-CM: R55 
ICD-9-CM: 780.2  7/13/2019 - Present UTI (urinary tract infection) ICD-10-CM: N39.0 ICD-9-CM: 599.0  7/13/2019 - Present COPD (chronic obstructive pulmonary disease) ([facility]) ICD-10-CM: J44.9 ICD-9-CM: 738  7/13/2019 - Present Sepsis ([facility] ICD-10-CM: A41.9 ICD-9-CM: 038.9, 995.91  7/13/2019 - Present Normocytic anemia ICD-10-CM: D64.9 ICD-9-CM: 285.9  7/13/2019 - Present PAD (peripheral artery disease) ([facility]) ICD-10-CM: I73.9 ICD-9-CM: 443.9  7/13/2019 - Present Leg wound, left ICD-10-CM: O74.811F ICD-9-CM: 891.0  7/13/2019 - Present Leg laceration, right, initial encounter ICD-10-CM: R35.883Y ICD-9-CM: 894.0  7/13/2019 - Present Cellulitis of right upper arm ICD-10-CM: L03.113 ICD-9-CM: 682.3  5/17/2019 - Present Mild intermittent asthma ICD-10-CM: J45.20 ICD-9-CM: 493.90  5/17/2019 - Present Gangrene of finger of right hand [facility]) ICD-10-CM: P13 
ICD-9-CM: 785.4  5/17/2019 - Present Brachial artery thrombus (HCC) ICD-10-CM: S16.3 ICD-9-CM: 444.21  4/26/2019 - Present Bowel obstruction ([facility] ICD-10-CM: O35.147 ICD-9-CM: 560.9  1/8/2019 - Present Partial small bowel obstruction ([address] ICD-10-CM: K56.600 ICD-9-CM: 560.9  1/5/2019 - Present Sleep apnea ICD-10-CM: G47.30 ICD-9-CM: 780.57  1/5/2019 - Present Overview Signed 1/5/2019  8:41 PM by Panda Ulloa DO  
  wears CPAP Atrial fibrillation [facility]) ICD-10-CM: I48.91 
ICD-9-CM: 427.31  11/16/2018 - Present Dyspnea ICD-10-CM: R06.00 
ICD-9-CM: 786.09  11/13/2018 - Present ARF (acute renal failure) (HCC) ICD-10-CM: N17.9 ICD-9-CM: 584.9  11/13/2018 - Present Obesity (BMI 30-39.9) (Chronic) ICD-10-CM: R57.8 ICD-9-CM: 278.00  11/13/2018 - Present Closed wedge compression fracture of T7 vertebra ([facility] ICD-10-CM: X46.943C ICD-9-CM: 805.2  11/13/2018 - Present Type 2 diabetes with nephropathy ([facility] ICD-10-CM: E11.21 
ICD-9-CM: 250.40, 583.81  10/1/2018 - Present Chest pain ICD-10-CM: R07.9 ICD-9-CM: 786.50  6/27/2018 - Present Abdominal pain ICD-10-CM: R10.9 ICD-9-CM: 789.00  6/27/2018 - Present Recurrent deep vein thrombosis (DVT) (HCC) ICD-10-CM: I82.409 ICD-9-CM: 453.40  3/30/2018 - Present Chronic anticoagulation (Chronic) ICD-10-CM: Z79.01 
ICD-9-CM: V58.61  3/30/2018 - Present Coronary artery disease involving native coronary artery without angina pectoris (Chronic) ICD-10-CM: I25.10 ICD-9-CM: 414.01  1/22/2016 - Present Essential hypertension (Chronic) ICD-10-CM: I10 
ICD-9-CM: 401.9  1/22/2016 - Present CAD (coronary artery disease) ICD-10-CM: I25.10 ICD-9-CM: 414.00  10/9/2015 - Present Type II diabetes mellitus (HCC) (Chronic) ICD-10-CM: E11.9 ICD-9-CM: 250.00  10/9/2015 - Present NSTEMI (non-ST elevated myocardial infarction) ([facility] ICD-10-CM: I21.4 ICD-9-CM: 410.70  10/9/2015 - Present RESOLVED: HTN (hypertension) ICD-10-CM: I10 
ICD-9-CM: 401.9  10/9/2015 - 1/22/2016 Subjective:  
  69 y.o.  female who is admitted with bilateral leg cellulitis. Ms. Luis F Wu with past medical history of diabetes mellitus, CAD, GERD, DVT, hypertension, CAD, obesity, HAYES presented to ER complaining of abdominal pain, diarrhea, and poor appetite, which started about 2 weeks ago. The abdominal pain is generalized, sharp mild to moderate intensity associated with watery diarrhea. Last diarrhea was this morning which was watery. Denies fever. Patient has not been eating much the last 2 weeks. Denies shortness of breath, but has chronic cough.   Has been having bilateral leg swelling, redness and pain. (Dr Syl Fernandez 
 
3/24:  Feeling better this AM.  Nausea/vomiting resolved. No loose stools so far this AM.  Lactic acid back in normal range. WBC ok. Cultures neg so far. Tmax 99.4. K+ still low - replacing.  
 
3/25:  Feeling worse this AM.  Nausea has returned. C/O more pain in bilat LEs. Redness and heat in LEs has not improved. May add Vanc - will get ID to see and comment. Needs her home CPAP before giving more pain meds. K+ improving.   
 
3/26:  Feeling some better today - less nausea and Percocet has helped the pain. Also only one loose stool overnight per nurses. She has her home CPAP. ID has seen and antibiotics changed to [address]. Afeb. WBC ok. Ortho consulted also and has seen. CT of RLE without abscess. Checking uric acid. Review of Systems: A comprehensive review of systems was negative except for that written in the HPI. Objective:  
Physical Exam:  
Visit Vitals /62 (BP 1 Location: Right arm, BP Patient Position: At rest) Pulse 76 Temp 98.3 °F (36.8 °C) Resp 18 Ht 5' 7\" (1.702 m) Wt 113.4 kg (250 lb) SpO2 94% BMI 39.16 kg/m² O2 Device: Room air Temp (24hrs), Av.2 °F (36.8 °C), Min:97.9 °F (36.6 °C), Max:98.5 °F (36.9 °C) No intake/output data recorded. 1901 -  0700 In: 200 [P.O.:200] Out: - General:  Alert, obese, cooperative, no distress, appears stated age. Head:  Normocephalic, without obvious abnormality, atraumatic. Eyes:  Conjunctivae/corneas clear. PERRL, EOMs intact. Throat: Lips, mucosa, and tongue moist.. Neck: Supple, symmetrical, trachea midline, no adenopathy, thyroid: no enlargement/tenderness/nodules, no carotid bruit and no JVD. Lungs:   Clear to auscultation bilaterally. Chest wall:  No tenderness or deformity. Heart:  Regular rate and rhythm, S1, S2 normal, no murmur, click, rub or gallop. Abdomen:   Soft, non-tender. Bowel sounds normal. No masses,  No organomegaly. Extremities: no cyanosis. 2+ LE edema. No calf tenderness or cords. Redness and tenderness ant tibial surfaces bilat. Still warm/hot over lower ant tib surfaces/ankle/foot Skin:  turgor normal.   
Neurologic: CNII-XII intact. Alert and oriented X 3. Fine motor of hands and fingers normal.   equal.  No cogwheeling or rigidity. Gait not tested at this time. Sensation grossly normal to touch. Gross motor of extremities normal.    
 
Data Review:  
 CT AB and Pelvis 3/23/20:   INDICATION: Abdominal pain, cough, shortness of breath. Exam: Noncontrast CT of the abdomen and pelvis is performed with 5 mm 
collimation. Sagittal and coronal reformatted images were also performed. CT dose reduction was achieved through the use of a standardized protocol 
tailored for this examination and automatic exposure control for dose 
modulation. Direct comparison is made to prior CT dated January 2019. FINDINGS: 
There is minimal bibasilar linear scarring. There is a small hiatal hernia, 
unchanged. Abdomen:  
Liver: There is diffuse fatty infiltration of the liver. Spleen: The spleen is normal on noncontrast images. Adrenals: The adrenals are normal on noncontrast images. Pancreas: The pancreas is normal on noncontrast images. Gallbladder: The gallbladder is surgically absent. Kidneys: The kidneys are atrophic bilaterally. The right kidney measures 7.4 cm 
in sagittal length. Left kidney measures 8.3 cm in sagittal length. There is no 
hydronephrosis. Bowel: No thickened or dilated loop of large or small bowel seen. Appendix: The appendix is normal. 
Pelvis: Urinary bladder is partially filled and grossly normal. 
Bones: The osseous structures are diffusely demineralized. T7 compression 
fracture and kyphoplasty post procedure changes are noted. Miscellaneous: There is a 5.1 cm x 4.5 cm low-attenuation, thin-walled fluid collection anterior to the left femoral vessels, not present on prior CT dated January 2019. There is no free intraperitoneal gas or fluid. There is no focal 
intraperitoneal fluid collection to suggest abscess. There is a small 4.5 cm x 
2.8 cm fat-containing periumbilical hernia. The uterus is absent. IMPRESSION:  
1. 5.1 cm x 4.5 cm low-attenuation, thin-walled fluid collection anterior to the 
left femoral vessels, not present on prior CT dated January 2019. Finding likely 
represents a hematoma. Recommend clinical correlation. 2. Small hiatal hernia. 3. Hepatic steatosis. 4. No bowel obstruction, ileus or perforation. No intra-abdominal abscess. CT right LE 3/25/20 EXAM: CT LOW EXT RT W CONT 
 INDICATION: Cellulitis, pain COMPARISON: Duplex ultrasound from the same day CONTRAST: 100 mL of Isovue-300. \ 
 FINDINGS: Bones: Normal bone mineralization. No fracture, dislocation, or 
periosteal reaction. Joint fluid: None. Soft tissue: There is diffuse subcutaneous cutaneous edema without fluid 
collection. There is thickening of the medial and lateral skin along the lower 
leg. No subcutaneous gas. Skin thickening is also noted along the dorsum of the 
proximal foot. Vascular structures are within normal limits. IMPRESSION: No evidence of fracture, joint effusion, or periosteal reaction. Diffuse skin thickening and subcutaneous edema without fluid collection. Recent Days: 
Recent Labs  
  03/26/20 
0451 03/25/20 
0346 03/24/20 
0410 WBC 8.5 7.1 7.4 HGB 11.6 11.9 11.8 HCT 38.4 38.4 38.4  313 297 Recent Labs  
  03/26/20 
0451 03/25/20 
0346 03/24/20 
0410 03/23/20 
0815 * 134* 135* 134* K 3.7 3.4* 3.1* 2.9*  
 102 101 97 CO2 24 24 25 25 GLU 94 101* 93 119* BUN 9 9 10 9 CREA 1.22* 1.22* 1.21* 1.39* CA 7.0* 6.9* 7.1* 8.2* ALB 1.9* 1.8*  --  2.2* TBILI 0.6 0.5  --  0.9 SGOT 85* 61*  --  70* ALT 14 16  --  35  
 
 No results for input(s): PH, PCO2, PO2, HCO3, FIO2 in the last 72 hours. 24 Hour Results: 
Recent Results (from the past 24 hour(s)) GLUCOSE, POC Collection Time: 03/25/20  7:43 AM  
Result Value Ref Range Glucose (POC) 92 65 - 100 mg/dL Performed by Faisal Perez ([facility]) GLUCOSE, POC Collection Time: 03/25/20 11:30 AM  
Result Value Ref Range Glucose (POC) 79 65 - 100 mg/dL Performed by Faisal Perez ([facility]) GLUCOSE, POC Collection Time: 03/25/20 11:58 AM  
Result Value Ref Range Glucose (POC) 92 65 - 100 mg/dL Performed by Faisal Perez ([facility]) LACTIC ACID Collection Time: 03/25/20  4:29 PM  
Result Value Ref Range Lactic acid 1.2 0.4 - 2.0 MMOL/L  
GLUCOSE, POC Collection Time: 03/25/20  4:31 PM  
Result Value Ref Range Glucose (POC) 78 65 - 100 mg/dL Performed by Faisal Perez ([facility]) GLUCOSE, POC Collection Time: 03/25/20  4:53 PM  
Result Value Ref Range Glucose (POC) 99 65 - 100 mg/dL Performed by Faisal Perez ([facility]) GLUCOSE, POC Collection Time: 03/25/20  9:49 PM  
Result Value Ref Range Glucose (POC) 92 65 - 100 mg/dL Performed by Tom Dozier METABOLIC PANEL, COMPREHENSIVE Collection Time: 03/26/20  4:51 AM  
Result Value Ref Range Sodium 134 (L) 136 - 145 mmol/L Potassium 3.7 3.5 - 5.1 mmol/L Chloride 102 97 - 108 mmol/L  
 CO2 24 21 - 32 mmol/L Anion gap 8 5 - 15 mmol/L Glucose 94 65 - 100 mg/dL BUN 9 6 - 20 MG/DL Creatinine 1.22 (H) 0.55 - 1.02 MG/DL  
 BUN/Creatinine ratio 7 (L) 12 - 20 GFR est AA 53 (L) >60 ml/min/1.73m2 GFR est non-AA 43 (L) >60 ml/min/1.73m2 Calcium 7.0 (L) 8.5 - 10.1 MG/DL Bilirubin, total 0.6 0.2 - 1.0 MG/DL  
 ALT (SGPT) 14 12 - 78 U/L  
 AST (SGOT) 85 (H) 15 - 37 U/L Alk. phosphatase 203 (H) 45 - 117 U/L Protein, total 5.3 (L) 6.4 - 8.2 g/dL Albumin 1.9 (L) 3.5 - 5.0 g/dL Globulin 3.4 2.0 - 4.0 g/dL  A-G Ratio 0.6 (L) 1.1 - 2.2    
CBC WITH AUTOMATED DIFF  
 Collection Time: 03/26/20  4:51 AM  
Result Value Ref Range WBC 8.5 3.6 - 11.0 K/uL  
 RBC 3.97 3.80 - 5.20 M/uL  
 HGB 11.6 11.5 - 16.0 g/dL HCT 38.4 35.0 - 47.0 % MCV 96.7 80.0 - 99.0 FL  
 MCH 29.2 26.0 - 34.0 PG  
 MCHC 30.2 30.0 - 36.5 g/dL  
 RDW 16.6 (H) 11.5 - 14.5 % PLATELET 278 378 - 063 K/uL MPV 9.6 8.9 - 12.9 FL  
 NRBC 0.0 0  WBC ABSOLUTE NRBC 0.00 0.00 - 0.01 K/uL NEUTROPHILS 66 32 - 75 % LYMPHOCYTES 17 12 - 49 % MONOCYTES 11 5 - 13 % EOSINOPHILS 3 0 - 7 % BASOPHILS 1 0 - 1 % IMMATURE GRANULOCYTES 2 (H) 0.0 - 0.5 % ABS. NEUTROPHILS 5.6 1.8 - 8.0 K/UL  
 ABS. LYMPHOCYTES 1.5 0.8 - 3.5 K/UL  
 ABS. MONOCYTES 0.9 0.0 - 1.0 K/UL  
 ABS. EOSINOPHILS 0.3 0.0 - 0.4 K/UL  
 ABS. BASOPHILS 0.1 0.0 - 0.1 K/UL  
 ABS. IMM. GRANS. 0.2 (H) 0.00 - 0.04 K/UL  
 DF AUTOMATED Medications reviewed Current Facility-Administered Medications Medication Dose Route Frequency  aspirin delayed-release tablet 81 mg  81 mg Oral DAILY  DULoxetine (CYMBALTA) capsule 60 mg  60 mg Oral DAILY  ferrous sulfate (SLOW FE) ER tablet 142 mg (Patient Supplied)  142 mg Oral ACB  mirtazapine (REMERON) tablet 15 mg  15 mg Oral QHS  pantoprazole (PROTONIX) tablet 40 mg  40 mg Oral ACB  DAPTOmycin (CUBICIN) 700 mg in 0.9% sodium chloride 14 mL IV Syringe  700 mg IntraVENous QPM  
 cefepime (MAXIPIME) 2 g in 0.9% sodium chloride (MBP/ADV) 100 mL  2 g IntraVENous Q12H  
 HYDROcodone-acetaminophen (NORCO) 5-325 mg per tablet 1 Tab  1 Tab Oral Q6H PRN  potassium chloride SR (KLOR-CON 10) tablet 10 mEq  10 mEq Oral BID  sodium chloride (NS) flush 5-40 mL  5-40 mL IntraVENous Q8H  
 sodium chloride (NS) flush 5-40 mL  5-40 mL IntraVENous PRN  
 insulin lispro (HUMALOG) injection   SubCUTAneous AC&HS  
 glucose chewable tablet 16 g  4 Tab Oral PRN  
 glucagon (GLUCAGEN) injection 1 mg  1 mg IntraMUSCular PRN  
  dextrose 10% infusion 0-250 mL  0-250 mL IntraVENous PRN  
 0.9% sodium chloride with KCl 20 mEq/L infusion   IntraVENous CONTINUOUS  
 acetaminophen (TYLENOL) tablet 325 mg  325 mg Oral Q4H PRN  
 ondansetron (ZOFRAN) injection 4 mg  4 mg IntraVENous Q4H PRN Care Plan discussed with: Patient and Nurse Total time spent with patient: 30 minutes.  
Dicky Kayser, MD

## 2020-03-26 NOTE — PROGRESS NOTES
No abscess of BL LE on CT. No need for surgical intervention. Elevation. ID managing antibiotics. Please reconsult if further surgical indications arise. SALIMA Sow-C Orthopaedic Surgery  [facility]

## 2020-03-26 NOTE — PROGRESS NOTES
Nutrition Assessment: 
 
RECOMMENDATIONS/INTERVENTION(S):  
1. Continue consistent carb diet. 2. Add Glucerna BID to promote adequate po intake. 3. Please obtain new weight to accurately assess weight status. 4. Continue to monitor intakes, wt changes, labs, skin. ASSESSMENT:  
3/26: Pt assessed for LOS. Admitted with leg cellulitis. PMH includes CAD, DM, HTN, COPD. BMI 39.1, c/w obesity. Charting remotely, pt did not answer phone. Per H&P, pt c/o abd pain, diarrhea, N/V, and poor appetite/po intake x2 weeks PTA. Per MD note, nausea improved today and pt only had one loose stool overnight. Recorded intake of 0% breakfast this AM. Recorded weights show 6% weight loss x2 months, however CBW in chart is pt stated so unsure accuracy. Need new standing scale or bed weight to accurately assess weight changes. Last A1c in chart is 7.7 from 05/2019, BG in-house have been WNL. Recommend getting new A1c. Per RD notes from previous admissions, pt has received Glucerna in the past- will add BID to promote intake. Will monitor acceptance. Labs- Na 134, Ca 7.0. Meds- cefepime, daptomycin, KCl, Remeron, Protonix, Zofran, Humalog (has not been given). Diet Order: Consistent carb 
% Eaten:   
Patient Vitals for the past 72 hrs: 
 % Diet Eaten  
03/25/20 0927 0 % Pertinent Medications: [x] Reviewed Labs: [x] Reviewed Anthropometrics: Height: 5' 7\" (170.2 cm) Weight: 113.4 kg (250 lb) IBW (%IBW):   ( ) UBW (%UBW):   (  %) BMI: Body mass index is 39.16 kg/m². This BMI is indicative of: 
 [] Underweight    [] Normal    [] Overweight    [x]  Obesity    []  Extreme Obesity (BMI>40) Estimated Nutrition Needs (Based on): 2177 Kcals/day(1655 x 1. 3 AF) , 113 g(1-1.2 g/kg) Protein Carbohydrate: At Least 130 g/day  Fluids: 2177 mL/day (1 ml/kcal) Last BM: 3/25   [x]Active     []Hyperactive  []Hypoactive       [] Absent   BS Skin:    [] Intact   [] Incision  [] Breakdown   [] DTI   [] Tears/Excoriation/Abrasion  []Edema [x] Other: leg cellulitis Wt Readings from Last 30 Encounters:  
03/23/20 113.4 kg (250 lb) 01/31/20 120.9 kg (266 lb 9.6 oz) 10/31/19 112.9 kg (249 lb) 09/05/19 108.9 kg (240 lb) 08/30/19 109.8 kg (242 lb) 08/01/19 106.1 kg (234 lb)  
07/30/19 106.1 kg (234 lb)  
07/30/19 106.1 kg (234 lb)  
07/14/19 99.8 kg (220 lb)  
06/28/19 101.2 kg (223 lb)  
06/12/19 105 kg (231 lb 7.7 oz) 05/23/19 105.3 kg (232 lb 1.6 oz) 05/13/19 111.1 kg (245 lb) 04/27/19 118 kg (260 lb 3.2 oz)  
03/25/19 108.9 kg (240 lb) 01/07/19 99.3 kg (219 lb)  
11/19/18 99.4 kg (219 lb 3.2 oz)  
11/14/18 110.2 kg (243 lb) 10/19/18 118.8 kg (261 lb 14.5 oz) 10/17/18 112.5 kg (248 lb) 10/01/18 117.7 kg (259 lb 6.4 oz) 08/31/18 111.6 kg (246 lb)  
06/30/18 110.5 kg (243 lb 9.6 oz) 03/30/18 113.4 kg (250 lb) 04/16/17 70.3 kg (155 lb) 04/16/17 70.3 kg (155 lb)  
01/16/17 112 kg (247 lb)  
07/15/16 113.4 kg (250 lb) 02/09/16 117.5 kg (259 lb)  
01/29/16 117 kg (258 lb) NUTRITION DIAGNOSES:  
Problem:  Inadequate oral intake Etiology: related to decreased ability to consume sufficient po intake Signs/Symptoms: as evidenced by poor po intake x2 weeks per pt NUTRITION INTERVENTIONS: 
Meals/Snacks: General/healthful diet   Supplements: Commercial supplement GOAL:  
PO >50% meals + ONS next 2-4 days Cultural, Scientologist, or Ethnic Dietary Needs: None EDUCATION & DISCHARGE NEEDS:  
 [x] None Identified 
 [] Identified and Education Provided/Documented 
 [] Identified and Pt declined/was not appropriate 
  
 [] Interdisciplinary Care Plan Reviewed/Documented  
 [x] Discharge Needs: consistent carb diet 
 [] No Nutrition Related Discharge Needs NUTRITION RISK:  
Pt Is At Nutrition Risk  [x] No Nutrition Risk Identified  [] PT SEEN FOR:  
 []  MD Consult: []Calorie Count []Diabetic Diet Education []Diet Education []Electrolyte Management []General Nutrition Management and Supplements []Management of Tube Feeding []TPN Recommendations []  RN Referral:  []MST score >=2 
   []Enteral/Parenteral Nutrition PTA []Pregnant: Gestational DM or Multigestation  
              [] Pressure Ulcer 
 
[]  Low BMI      [x]  Length of Stay       [] Dysphagia Diet         [] Ventilator 
[]  Follow-up Previous Recommendations: 
 [] Implemented          [] Not Implemented          [x] Not Applicable Previous Goal: 
 [] Met              [] Progressing Towards Goal              [] Not Progressing Towards Goal   [x] Not Applicable Tawana Meyers, NADINEN Pager [phone] Phone [phone]

## 2020-03-26 NOTE — PROGRESS NOTES
CM Note: 
Transitions of Care Plan:    RUR   27 % High Risk 1. CM to follow through for final d/c plan 2. PT/OT recommending HH vs IPR pending progress,used [address] previously--has not worked with tx today because of pain 3. D/C when stable to home, pt resides at [address] [place] [address]  
4. Outpatient f/u PCP 
5. Family to transport at d/c L. Nain Valenzuela

## 2020-03-26 NOTE — PROGRESS NOTES
[facility] Progress Note Mateus Baires DO 
  [phone] Office [phone]  Fax 3/26/2020 Assessment & Plan:  
1. Questionable right lower extremity cellulitis. The patient afebrile with a normal white count. The leg is not erythematous. No abscess on CT scan. We will see how patient responds to empiric antibiotics. Uric acid has been ordered. Will check CRP. Continue  vancomycin and cefepime. 2. Chronic kidney disease. This is stable. Continue to follow. 3. History of Cipro, sulfa, and tetracycline allergies. 4. Diarrhea. CT scan of the abdomen and pelvis was unremarkable. Enteric pathogens panel was negative as well. Very low suspicion for Clostridium difficile at this time. Continue to follow. 5. Diabetes mellitus. Hemoglobin A1c from 2019 was 7.7. 
   
  
 
 
Subjective:  
 
Still experiencing severe right leg pain Objective:  
 
Vitals:  
Visit Vitals /65 (BP 1 Location: Right arm, BP Patient Position: At rest) Pulse (!) 107 Temp 97.9 °F (36.6 °C) Resp 20 Ht 5' 7\" (1.702 m) Wt 250 lb (113.4 kg) SpO2 93% BMI 39.16 kg/m² Tmax:  Temp (24hrs), Av.2 °F (36.8 °C), Min:97.9 °F (36.6 °C), Max:98.4 °F (36.9 °C) Exam:  
Patient is intubated:  no 
 
Physical Examination:  
General:  Alert, cooperative, no distress Head:  Normocephalic, atraumatic. Eyes:  Conjunctivae clear Neck: Supple Lungs:   No distress. CTA anteriorly Chest wall:    
Heart:  Regular rate and rhythm Abdomen:    Obese, nontender Extremities: Moves all. Right ankle pain with ROM Skin:  No erythema. Several scabbed areas but no purulence or induration Neurologic: CNII-XII intact. Normal strength Labs:     
 
No lab exists for component: ITNL No results for input(s): CPK, CKMB, TROIQ in the last 72 hours. Recent Labs  
  20 
0451 20 
0346 20 
0410 * 134* 135* K 3.7 3.4* 3.1*  
  102 101 CO2 24 24 25 BUN 9 9 10 CREA 1.22* 1.22* 1.21* GLU 94 101* 93 ALB 1.9* 1.8*  --   
WBC 8.5 7.1 7.4 HGB 11.6 11.9 11.8 HCT 38.4 38.4 38.4  313 297 No results for input(s): INR, PTP, APTT, INREXT in the last 72 hours. Needs: urine analysis, urine sodium, protein and creatinine No results found for: Dong Cu[place]emoc Cultures: No results found for: CANDIS Lab Results Component Value Date/Time Culture result: NO GROWTH 3 DAYS 03/23/2020 08:38 AM  
 Culture result: KLEBSIELLA PNEUMONIAE (A) 07/13/2019 02:05 PM  
 Culture result: KLEBSIELLA PNEUMONIAE (A) 06/28/2019 04:34 PM  
 
 
Radiology:  
 
Medications Current Facility-Administered Medications Medication Dose Route Frequency Last Dose  oxyCODONE IR (ROXICODONE) tablet 5 mg  5 mg Oral Q4H PRN  prochlorperazine (COMPAZINE) injection 5 mg  5 mg IntraVENous Q6H PRN    
 aspirin delayed-release tablet 81 mg  81 mg Oral DAILY 81 mg at 03/26/20 7895  DULoxetine (CYMBALTA) capsule 60 mg  60 mg Oral DAILY 60 mg at 03/26/20 2232  ferrous sulfate (SLOW FE) ER tablet 142 mg (Patient Supplied)  142 mg Oral ACB  mirtazapine (REMERON) tablet 15 mg  15 mg Oral QHS 15 mg at 03/25/20 2226  pantoprazole (PROTONIX) tablet 40 mg  40 mg Oral ACB 40 mg at 03/26/20 6656  DAPTOmycin (CUBICIN) 700 mg in 0.9% sodium chloride 14 mL IV Syringe  700 mg IntraVENous  mg at 03/25/20 1437  cefepime (MAXIPIME) 2 g in 0.9% sodium chloride (MBP/ADV) 100 mL  2 g IntraVENous Q12H 2 g at 03/26/20 0103  potassium chloride SR (KLOR-CON 10) tablet 10 mEq  10 mEq Oral BID 10 mEq at 03/26/20 0900  
 sodium chloride (NS) flush 5-40 mL  5-40 mL IntraVENous Q8H 10 mL at 03/25/20 1400  
 sodium chloride (NS) flush 5-40 mL  5-40 mL IntraVENous PRN    
 insulin lispro (HUMALOG) injection   SubCUTAneous AC&HS Stopped at 03/23/20 2200  
 glucose chewable tablet 16 g  4 Tab Oral PRN    
  glucagon (GLUCAGEN) injection 1 mg  1 mg IntraMUSCular PRN    
 dextrose 10% infusion 0-250 mL  0-250 mL IntraVENous PRN    
 0.9% sodium chloride with KCl 20 mEq/L infusion   IntraVENous CONTINUOUS    
 acetaminophen (TYLENOL) tablet 325 mg  325 mg Oral Q4H  mg at 03/25/20 0750  ondansetron (ZOFRAN) injection 4 mg  4 mg IntraVENous Q4H PRN 4 mg at 03/26/20 1617 Case discussed with: 
 
 
Tess Villafuerte DO

## 2020-03-26 NOTE — PROGRESS NOTES
PHYSICAL THERAPY:Talked with pts nurse. Pt with increased pain and not likely agreeable to PT this AM.PT will continue to follow.

## 2020-03-26 NOTE — PROGRESS NOTES
Occupational Therapy Note: Attempted tx second time and pt receiving test. Will follow next tx date.

## 2020-03-27 NOTE — PROGRESS NOTES
Problem: Self Care Deficits Care Plan (Adult) Goal: *Acute Goals and Plan of Care (Insert Text) Description:  
FUNCTIONAL STATUS PRIOR TO ADMISSION: Patient was modified independent using a rollator for functional mobility. HOME SUPPORT: The patient lived alone with daughter and son-in-law to provide assistance. Pt's daughter assists with groceries, meals and house cleaning 2x a week. Occupational Therapy Goals Initiated 3/25/2020 1. Patient will perform lower body dressing with minimal assistance/contact guard assist within 7 day(s). 2.  Patient will perform toilet transfers with moderate assistance using RW within 7 day(s). 3.  Patient will perform all aspects of toileting with moderate assistance  within 7 day(s). 4.  Patient will participate in upper extremity therapeutic exercise/activities with modified independence for 10 minutes within 7 day(s). 5.  Patient will utilize energy conservation techniques during functional activities with verbal and visual cues within 7 day(s). Outcome: Progressing Towards Goals OCCUPATIONAL THERAPY TREATMENT Patient: Bianka Nava (4 y.o. female) Date: 3/27/2020 Diagnosis: Cellulitis [L03.90] Cellulitis, leg [C10.592] Cellulitis Precautions: Contact, Fall Chart, occupational therapy assessment, plan of care, and goals were reviewed. ASSESSMENT Patient continues with skilled OT services and is not progressing towards goals. Today she states 10/10 B lower LE pain and needing max encouragement to participate. Assist x 2 for supine to sit and pt not able to tolerate sitting longer than approximately 30 seconds before screaming \"I have to lie down\" and lowered her UB towards bed, total assist managing LE's. Currently she requires significant physical assist for ADLs and mobility. Current Level of Function Impacting Discharge (ADLs): Total assist bath and dress Other factors to consider for discharge: PLAN : 
 Patient continues to benefit from skilled intervention to address the above impairments. Continue treatment per established plan of care. to address goals. Recommend with staff: Bed in chair position for ADl's Recommend next OT session: Cont towards goals Recommendation for discharge: (in order for the patient to meet his/her long term goals) Therapy up to 5 days/week in SNF setting This discharge recommendation: 
Has been made in collaboration with the attending provider and/or case management IF patient discharges home will need the following DME: none SUBJECTIVE:  
Patient stated I can't do exercise OBJECTIVE DATA SUMMARY:  
Cognitive/Behavioral Status: 
Neurologic State: Alert Orientation Level: Appropriate for age;Oriented X4 Cognition: Appropriate decision making; Appropriate for age attention/concentration; Appropriate safety awareness; Follows commands Functional Mobility and Transfers for ADLs: 
Bed Mobility: 
Supine to Sit: Maximum assistance Sit to Supine: Maximum assistance Transfers: 
  
 Not attempted Balance:Impaired standing balance ADL Intervention: 
  
 
  
 
Upper Body Bathing Bathing Assistance: Total assistance(dependent) Lower Body Bathing Bathing Assistance: Total assistance(dependent) Upper Body Dressing Assistance Dressing Assistance: Total assistance(dependent) Lower Body Dressing Assistance Dressing Assistance: Total assistance(dependent) Toileting Toileting Assistance: Total assistance(dependent) Activity Tolerance:  
Poor Please refer to the flowsheet for vital signs taken during this treatment. After treatment patient left in no apparent distress:  
Supine in bed COMMUNICATION/COLLABORATION:  
The patients plan of care was discussed with: Physical therapy assistant, Occupational therapist, Registered nurse, and Case management. EZRA Lowe Time Calculation: 15 mins

## 2020-03-27 NOTE — PROGRESS NOTES
VAT Note - Attempted MIdline placement R Cephalic vein but vessel caliber diminished with spasm and unable to cannulate  Attempted R Basilic Vein which is >2 cm deep and unable to pass wire through needle then subsequent vasospasm precluded use of this vessel. Pt has asked that we stop attempting at this time. Reminded pt we would not be able to return tomorrow and she states I just want you to stop. Attempts stopped and pts RN advised of events.

## 2020-03-27 NOTE — PROGRESS NOTES
Problem: Mobility Impaired (Adult and Pediatric) Goal: *Acute Goals and Plan of Care (Insert Text) Description: FUNCTIONAL STATUS PRIOR TO ADMISSION: Patient was modified independent using a rollator for functional mobility. HOME SUPPORT PRIOR TO ADMISSION: The patient lived alone with family to provide assistance. Physical Therapy Goals Initiated 3/25/2020 1. Patient will move from supine to sit and sit to supine  in bed with minimal assistance/contact guard assist within 7 day(s). 2.  Patient will transfer from bed to chair and chair to bed with minimal assistance/contact guard assist using the least restrictive device within 7 day(s). 3.  Patient will perform sit to stand with minimal assistance/contact guard assist within 7 day(s). 4.  Patient will ambulate with minimal assistance/contact guard assist for 50 feet with the least restrictive device within 7 day(s). Note: PHYSICAL THERAPY TREATMENT Patient: Denisa Chadwick (17 y.o. female) Date: 3/27/2020 Diagnosis: Cellulitis [L03.90] Cellulitis, leg [I95.347] Cellulitis Precautions: Contact, Fall Chart, physical therapy assessment, plan of care and goals were reviewed. ASSESSMENT Patient continues with skilled PT services. Pt needing much encouragement to participate. Pt supine to sit with max to total assist of 2. Pt quickly progressed to CGA sitting on EOB. Pt then screamed to return to sit reporting much pain to LE. Pt returned to supine max of 2. Pt Progress slow. Continue goals. Current Level of Function Impacting Discharge (mobility/balance): Pt max to total assist of 2 bed mobility. PLAN : 
Patient continues to benefit from skilled intervention to address the above impairments. Continue treatment per established plan of care. to address goals. Recommendation for discharge: (in order for the patient to meet his/her long term goals) Therapy up to 5 days/week in SNF setting This discharge recommendation: 
Has been made in collaboration with the attending provider and/or case management IF patient discharges home will need the following DME: TBD SUBJECTIVE:  
 
 
OBJECTIVE DATA SUMMARY:  
Critical Behavior: 
Neurologic State: Alert Orientation Level: Oriented X4 Cognition: Follows commands Safety/Judgement: Awareness of environment, Fall prevention, Insight into deficits Functional Mobility Training: 
Bed Mobility: 
Rolling: Maximum assistance; Total assistance;Assist x2 Supine to Sit: Maximum assistance Sit to Supine: Maximum assistance; Total assistance;Assist x2 Balance: 
Sitting: High guard Sitting - Static: Fair (occasional) Activity Tolerance:  
Poor Please refer to the flowsheet for vital signs taken during this treatment. After treatment patient left in no apparent distress:  
Supine in bed COMMUNICATION/COLLABORATION:  
The patients plan of care was discussed with: Physical therapist.  
 
Yoly Davis PTA Time Calculation: 23 mins

## 2020-03-27 NOTE — PROGRESS NOTES
Bedside shift change report given to Valerie (oncoming nurse) by Ren Hernandez (offgoing nurse). Report included the following information SBAR and MAR.

## 2020-03-27 NOTE — PROGRESS NOTES
CM Note: 
Transitions of Care Plan:    RUR   27 % High Risk 1. CM to follow through for final d/c plan 2. PT/OT recommending HH vs IPR pending progress,used [address] previously--has not worked with tx today because of pain 3. On IV abx, ID following 4. D/C when stable to home, pt resides at [address] [place] [address]  
5. Outpatient f/u PCP 6. Family to transport at d/c [facility] Tripathi

## 2020-03-27 NOTE — PROGRESS NOTES
Daily Progress Note: 3/27/2020 April Rancho Tristan MD 
 
Assessment/Plan:  
Cellulitis of bilateral legs(3/23/2020). She was given Zosyn and clindamycin in ER.   
- consulted ID and ID broadened antibiotics to Cefepime and Vanc 3/25 Lactic Acidosis/SIRS due to bilat LE cellulitis - Lactic acid 4.4 at admission  
- resolved (2.2 3/23) 
  
Diarrhea (3/23/2020)/ Abdominal pain (6/27/2018). CT of the abdomen as under Data Review 
- stool studies. Pain management and IV fluids for now 
  
Hyponatremia (3/23/2020)/ Hypokalemia (3/23/2020). This is most likely secondary to diarrhea. Replete potassium as needed and continue normal saline IV fluids. 
  
CAD (coronary artery disease) (10/9/2015)/chronic Atrial fibrillation ([address] [address] (11/16/2018)/Essential hypertension (1/22/2016). Continue home medications and monitor on remote telemetry.  
  
Type II diabetes mellitus ([address] (10/9/2015). Continue home medication and cover with sliding scale. Diabetic diet 
  
Sleep apnea (1/5/2019). Uses CPAP at home. May continue to use while she is here. OPD (chronic obstructive pulmonary disease) ([facility] (7/13/2019). Not wheezing stable. continue nebulizer treatments 
  
Severe obesity ([address] (7/30/2019). Would benefit from weight loss. Hypokalemia 
- monitor and replace as needed  
 
Elevated CRP and Uric acid 
- empiric trial of Colchicine Problem List: 
Problem List as of 3/27/2020 Date Reviewed: 8/1/2019 Codes Class Noted - Resolved * (Principal) Cellulitis ICD-10-CM: L03.90 ICD-9-CM: 682.9  3/23/2020 - Present Hypokalemia ICD-10-CM: E87.6 ICD-9-CM: 276.8  3/23/2020 - Present Hyponatremia ICD-10-CM: E87.1 ICD-9-CM: 276.1  3/23/2020 - Present Diarrhea ICD-10-CM: R19.7 ICD-9-CM: 787.91  3/23/2020 - Present Cellulitis, leg ICD-10-CM: L03.119 ICD-9-CM: 682.6  3/23/2020 - Present ASO (arteriosclerosis obliterans) ICD-10-CM: I70.90 ICD-9-CM: 440.9  9/5/2019 - Present PVD (peripheral vascular disease) ([facility] ICD-10-CM: I73.9 ICD-9-CM: 443.9  8/1/2019 - Present Severe obesity ([facility] ICD-10-CM: E66.01 
ICD-9-CM: 278.01  7/30/2019 - Present Syncope and collapse ICD-10-CM: R55 
ICD-9-CM: 780.2  7/13/2019 - Present UTI (urinary tract infection) ICD-10-CM: N39.0 ICD-9-CM: 599.0  7/13/2019 - Present COPD (chronic obstructive pulmonary disease) ([facility]) ICD-10-CM: J44.9 ICD-9-CM: 248  7/13/2019 - Present Sepsis ([facility] ICD-10-CM: A41.9 ICD-9-CM: 038.9, 995.91  7/13/2019 - Present Normocytic anemia ICD-10-CM: D64.9 ICD-9-CM: 285.9  7/13/2019 - Present PAD (peripheral artery disease) ([facility]) ICD-10-CM: I73.9 ICD-9-CM: 443.9  7/13/2019 - Present Leg wound, left ICD-10-CM: A84.919H ICD-9-CM: 891.0  7/13/2019 - Present Leg laceration, right, initial encounter ICD-10-CM: Z50.452I ICD-9-CM: 894.0  7/13/2019 - Present Cellulitis of right upper arm ICD-10-CM: L03.113 ICD-9-CM: 682.3  5/17/2019 - Present Mild intermittent asthma ICD-10-CM: J45.20 ICD-9-CM: 493.90  5/17/2019 - Present Gangrene of finger of right hand [facility]) ICD-10-CM: D06 
ICD-9-CM: 785.4  5/17/2019 - Present Brachial artery thrombus (HCC) ICD-10-CM: O72.6 ICD-9-CM: 444.21  4/26/2019 - Present Bowel obstruction ([facility] ICD-10-CM: J75.426 ICD-9-CM: 560.9  1/8/2019 - Present Partial small bowel obstruction ([facility] ICD-10-CM: K56.600 ICD-9-CM: 560.9  1/5/2019 - Present Sleep apnea ICD-10-CM: G47.30 ICD-9-CM: 780.57  1/5/2019 - Present Overview Signed 1/5/2019  8:41 PM by Shabana Basurto DO  
  wears CPAP Atrial fibrillation [facility]) ICD-10-CM: I48.91 
ICD-9-CM: 427.31  11/16/2018 - Present Dyspnea ICD-10-CM: R06.00 
ICD-9-CM: 786.09  11/13/2018 - Present ARF (acute renal failure) (HCC) ICD-10-CM: N17.9 ICD-9-CM: 584.9  11/13/2018 - Present Obesity (BMI 30-39.9) (Chronic) ICD-10-CM: C45.1 ICD-9-CM: 278.00  11/13/2018 - Present Closed wedge compression fracture of T7 vertebra ([facility] ICD-10-CM: O93.861F ICD-9-CM: 805.2  11/13/2018 - Present Type 2 diabetes with nephropathy ([facility] ICD-10-CM: E11.21 
ICD-9-CM: 250.40, 583.81  10/1/2018 - Present Chest pain ICD-10-CM: R07.9 ICD-9-CM: 786.50  6/27/2018 - Present Abdominal pain ICD-10-CM: R10.9 ICD-9-CM: 789.00  6/27/2018 - Present Recurrent deep vein thrombosis (DVT) (HCC) ICD-10-CM: I82.409 ICD-9-CM: 453.40  3/30/2018 - Present Chronic anticoagulation (Chronic) ICD-10-CM: Z79.01 
ICD-9-CM: V58.61  3/30/2018 - Present Coronary artery disease involving native coronary artery without angina pectoris (Chronic) ICD-10-CM: I25.10 ICD-9-CM: 414.01  1/22/2016 - Present Essential hypertension (Chronic) ICD-10-CM: I10 
ICD-9-CM: 401.9  1/22/2016 - Present CAD (coronary artery disease) ICD-10-CM: I25.10 ICD-9-CM: 414.00  10/9/2015 - Present Type II diabetes mellitus (HCC) (Chronic) ICD-10-CM: E11.9 ICD-9-CM: 250.00  10/9/2015 - Present NSTEMI (non-ST elevated myocardial infarction) ([facility] ICD-10-CM: I21.4 ICD-9-CM: 410.70  10/9/2015 - Present RESOLVED: HTN (hypertension) ICD-10-CM: I10 
ICD-9-CM: 401.9  10/9/2015 - 1/22/2016 Subjective:  
  69 y.o.  female who is admitted with bilateral leg cellulitis. Ms. Emma Brooks with past medical history of diabetes mellitus, CAD, GERD, DVT, hypertension, CAD, obesity, HAYES presented to ER complaining of abdominal pain, diarrhea, and poor appetite, which started about 2 weeks ago. The abdominal pain is generalized, sharp mild to moderate intensity associated with watery diarrhea. Last diarrhea was this morning which was watery. Denies fever. Patient has not been eating much the last 2 weeks. Denies shortness of breath, but has chronic cough.   Has been having bilateral leg swelling, redness and pain. (Dr Peggy Valle 
 
3/24:  Feeling better this AM.  Nausea/vomiting resolved. No loose stools so far this AM.  Lactic acid back in normal range. WBC ok. Cultures neg so far. Tmax 99.4. K+ still low - replacing.  
 
3/25:  Feeling worse this AM.  Nausea has returned. C/O more pain in bilat LEs. Redness and heat in LEs has not improved. May add Vanc - will get ID to see and comment. Needs her home CPAP before giving more pain meds. K+ improving.   
 
3/26:  Feeling some better today - less nausea and Percocet has helped the pain. Also only one loose stool overnight per nurses. She has her home CPAP. ID has seen and antibiotics changed to [address]. Afeb. WBC ok. Ortho consulted also and has seen. CT of RLE without abscess. Checking uric acid. 3/27:  Still c/o severe pain but now more localized to right ankle. Less show less discoloration and are not as warm. CRP and Uric acid up a bit - will try empiric colchicine - possible side effects discussed. BP a bit lower - monitoring. Only two loose stools overnight. Review of Systems: A comprehensive review of systems was negative except for that written in the HPI. Objective:  
Physical Exam:  
Visit Vitals BP 93/56 (BP 1 Location: Right arm, BP Patient Position: Supine) Pulse 81 Temp 98.2 °F (36.8 °C) Resp 18 Ht 5' 7\" (1.702 m) Wt 113.4 kg (250 lb) SpO2 98% BMI 39.16 kg/m² O2 Device: Room air Temp (24hrs), Av.2 °F (36.8 °C), Min:97.9 °F (36.6 °C), Max:98.4 °F (36.9 °C) 
   1901 -  0700 In: -  
Out: 300 [Urine:300]   No intake/output data recorded. General:  Alert, obese, cooperative, no distress, appears stated age. Head:  Normocephalic, without obvious abnormality, atraumatic. Eyes:  Conjunctivae/corneas clear. PERRL, EOMs intact. Throat: Lips, mucosa, and tongue moist.. Neck: Supple, symmetrical, trachea midline, no adenopathy, thyroid: no enlargement/tenderness/nodules, no carotid bruit and no JVD. Lungs:   Clear to auscultation bilaterally. Chest wall:  No tenderness or deformity. Heart:  Regular rate and rhythm, S1, S2 normal, no murmur, click, rub or gallop. Abdomen:   Soft, non-tender. Bowel sounds normal. No masses,  No organomegaly. Extremities: no cyanosis. 2+ LE edema. No calf tenderness or cords. Redness and tenderness ant tibial surfaces bilat is less. less warm/hot over lower ant tib surfaces/ankle/foot. More tenderness right ankle. 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 Skin:  turgor normal.   
Neurologic: CNII-XII intact. Alert and oriented X 3. Fine motor of hands and fingers normal.   equal.  No cogwheeling or rigidity. Gait not tested at this time. Sensation grossly normal to touch. Gross motor of extremities normal.    
 
Data Review:  
 CT AB and Pelvis 3/23/20:   INDICATION: Abdominal pain, cough, shortness of breath. Exam: Noncontrast CT of the abdomen and pelvis is performed with 5 mm 
collimation. Sagittal and coronal reformatted images were also performed. CT dose reduction was achieved through the use of a standardized protocol 
tailored for this examination and automatic exposure control for dose 
modulation. Direct comparison is made to prior CT dated January 2019. FINDINGS: 
There is minimal bibasilar linear scarring. There is a small hiatal hernia, 
unchanged. Abdomen:  
Liver: There is diffuse fatty infiltration of the liver. Spleen: The spleen is normal on noncontrast images. Adrenals: The adrenals are normal on noncontrast images. Pancreas: The pancreas is normal on noncontrast images. Gallbladder: The gallbladder is surgically absent. Kidneys: The kidneys are atrophic bilaterally. The right kidney measures 7.4 cm 
in sagittal length. Left kidney measures 8.3 cm in sagittal length. There is no 
hydronephrosis. Bowel: No thickened or dilated loop of large or small bowel seen. Appendix: The appendix is normal. 
Pelvis: Urinary bladder is partially filled and grossly normal. 
Bones: The osseous structures are diffusely demineralized. T7 compression 
fracture and kyphoplasty post procedure changes are noted. Miscellaneous: There is a 5.1 cm x 4.5 cm low-attenuation, thin-walled fluid 
collection anterior to the left femoral vessels, not present on prior CT dated January 2019. There is no free intraperitoneal gas or fluid. There is no focal 
intraperitoneal fluid collection to suggest abscess. There is a small 4.5 cm x 
2.8 cm fat-containing periumbilical hernia. The uterus is absent. IMPRESSION:  
1. 5.1 cm x 4.5 cm low-attenuation, thin-walled fluid collection anterior to the 
left femoral vessels, not present on prior CT dated January 2019. Finding likely 
represents a hematoma. Recommend clinical correlation. 2. Small hiatal hernia. 3. Hepatic steatosis. 4. No bowel obstruction, ileus or perforation. No intra-abdominal abscess. CT right LE 3/25/20 EXAM: CT LOW EXT RT W CONT 
 INDICATION: Cellulitis, pain COMPARISON: Duplex ultrasound from the same day CONTRAST: 100 mL of Isovue-300. \ 
 FINDINGS: Bones: Normal bone mineralization. No fracture, dislocation, or 
periosteal reaction. Joint fluid: None. Soft tissue: There is diffuse subcutaneous cutaneous edema without fluid 
collection. There is thickening of the medial and lateral skin along the lower 
leg. No subcutaneous gas. Skin thickening is also noted along the dorsum of the 
proximal foot. Vascular structures are within normal limits. IMPRESSION: No evidence of fracture, joint effusion, or periosteal reaction. Diffuse skin thickening and subcutaneous edema without fluid collection. Recent Days: 
Recent Labs  
  03/27/20 
0244 03/26/20 0451 03/25/20 0346 WBC 10.0 8.5 7.1 HGB 11.8 11.6 11.9 HCT 38.4 38.4 38.4  325 313 Recent Labs  
  03/27/20 
0244 03/26/20 0451 03/25/20 0346 * 134* 134* K 3.7 3.7 3.4*  
 102 102 CO2 20* 24 24 GLU 87 94 101* BUN 11 9 9 CREA 1.59* 1.22* 1.22* CA 7.0* 7.0* 6.9*  
MG 1.5*  --   --   
ALB 2.0* 1.9* 1.8* TBILI 0.8 0.6 0.5 SGOT 96* 85* 61* ALT 14 14 16 No results for input(s): PH, PCO2, PO2, HCO3, FIO2 in the last 72 hours. 24 Hour Results: 
Recent Results (from the past 24 hour(s)) GLUCOSE, POC Collection Time: 03/26/20  7:59 AM  
Result Value Ref Range Glucose (POC) 75 65 - 100 mg/dL Performed by Bell Bond  (PCT) GLUCOSE, POC Collection Time: 03/26/20  8:25 AM  
Result Value Ref Range Glucose (POC) 89 65 - 100 mg/dL Performed by Bell Bond  (PCT) GLUCOSE, POC Collection Time: 03/26/20 11:30 AM  
Result Value Ref Range Glucose (POC) 99 65 - 100 mg/dL Performed by Bell Bond  (PCT) GLUCOSE, POC Collection Time: 03/26/20  4:16 PM  
Result Value Ref Range Glucose (POC) 100 65 - 100 mg/dL Performed by Silas Hines (CON) GLUCOSE, POC Collection Time: 03/26/20  9:05 PM  
Result Value Ref Range Glucose (POC) 84 65 - 100 mg/dL Performed by Mary Macias (PCT) CBC WITH AUTOMATED DIFF Collection Time: 03/27/20  2:44 AM  
Result Value Ref Range WBC 10.0 3.6 - 11.0 K/uL  
 RBC 3.95 3.80 - 5.20 M/uL  
 HGB 11.8 11.5 - 16.0 g/dL HCT 38.4 35.0 - 47.0 % MCV 97.2 80.0 - 99.0 FL  
 MCH 29.9 26.0 - 34.0 PG  
 MCHC 30.7 30.0 - 36.5 g/dL  
 RDW 16.8 (H) 11.5 - 14.5 % PLATELET 167 965 - 784 K/uL MPV 9.9 8.9 - 12.9 FL  
 NRBC 0.0 0  WBC ABSOLUTE NRBC 0.00 0.00 - 0.01 K/uL NEUTROPHILS 57 32 - 75 % BAND NEUTROPHILS 5 0 - 6 % LYMPHOCYTES 20 12 - 49 % MONOCYTES 14 (H) 5 - 13 % EOSINOPHILS 3 0 - 7 % BASOPHILS 0 0 - 1 % METAMYELOCYTES 1 (H) 0 % IMMATURE GRANULOCYTES 0 %  
 ABS. NEUTROPHILS 6.2 1.8 - 8.0 K/UL  
 ABS. LYMPHOCYTES 2.0 0.8 - 3.5 K/UL  
 ABS. MONOCYTES 1.4 (H) 0.0 - 1.0 K/UL ABS. EOSINOPHILS 0.3 0.0 - 0.4 K/UL  
 ABS. BASOPHILS 0.0 0.0 - 0.1 K/UL  
 ABS. IMM. GRANS. 0.0 K/UL  
 DF MANUAL    
 RBC COMMENTS ANISOCYTOSIS 
1+ METABOLIC PANEL, COMPREHENSIVE Collection Time: 03/27/20  2:44 AM  
Result Value Ref Range Sodium 130 (L) 136 - 145 mmol/L Potassium 3.7 3.5 - 5.1 mmol/L Chloride 102 97 - 108 mmol/L  
 CO2 20 (L) 21 - 32 mmol/L Anion gap 8 5 - 15 mmol/L Glucose 87 65 - 100 mg/dL BUN 11 6 - 20 MG/DL Creatinine 1.59 (H) 0.55 - 1.02 MG/DL  
 BUN/Creatinine ratio 7 (L) 12 - 20 GFR est AA 39 (L) >60 ml/min/1.73m2 GFR est non-AA 32 (L) >60 ml/min/1.73m2 Calcium 7.0 (L) 8.5 - 10.1 MG/DL Bilirubin, total 0.8 0.2 - 1.0 MG/DL  
 ALT (SGPT) 14 12 - 78 U/L  
 AST (SGOT) 96 (H) 15 - 37 U/L Alk. phosphatase 255 (H) 45 - 117 U/L Protein, total 5.4 (L) 6.4 - 8.2 g/dL Albumin 2.0 (L) 3.5 - 5.0 g/dL Globulin 3.4 2.0 - 4.0 g/dL A-G Ratio 0.6 (L) 1.1 - 2.2 MAGNESIUM Collection Time: 03/27/20  2:44 AM  
Result Value Ref Range Magnesium 1.5 (L) 1.6 - 2.4 mg/dL C REACTIVE PROTEIN, QT Collection Time: 03/27/20  2:44 AM  
Result Value Ref Range C-Reactive protein 7.35 (H) 0.00 - 0.60 mg/dL Medications reviewed Current Facility-Administered Medications Medication Dose Route Frequency  oxyCODONE IR (ROXICODONE) tablet 5 mg  5 mg Oral Q4H PRN  prochlorperazine (COMPAZINE) injection 5 mg  5 mg IntraVENous Q6H PRN  
 aspirin delayed-release tablet 81 mg  81 mg Oral DAILY  DULoxetine (CYMBALTA) capsule 60 mg  60 mg Oral DAILY  ferrous sulfate (SLOW FE) ER tablet 142 mg (Patient Supplied)  142 mg Oral ACB  mirtazapine (REMERON) tablet 15 mg  15 mg Oral QHS  pantoprazole (PROTONIX) tablet 40 mg  40 mg Oral ACB  DAPTOmycin (CUBICIN) 700 mg in 0.9% sodium chloride 14 mL IV Syringe  700 mg IntraVENous QPM  
 cefepime (MAXIPIME) 2 g in 0.9% sodium chloride (MBP/ADV) 100 mL  2 g IntraVENous Q12H  potassium chloride SR (KLOR-CON 10) tablet 10 mEq  10 mEq Oral BID  sodium chloride (NS) flush 5-40 mL  5-40 mL IntraVENous Q8H  
 sodium chloride (NS) flush 5-40 mL  5-40 mL IntraVENous PRN  
 insulin lispro (HUMALOG) injection   SubCUTAneous AC&HS  
 glucose chewable tablet 16 g  4 Tab Oral PRN  
 glucagon (GLUCAGEN) injection 1 mg  1 mg IntraMUSCular PRN  
 dextrose 10% infusion 0-250 mL  0-250 mL IntraVENous PRN  
 0.9% sodium chloride with KCl 20 mEq/L infusion   IntraVENous CONTINUOUS  
 acetaminophen (TYLENOL) tablet 325 mg  325 mg Oral Q4H PRN  
 ondansetron (ZOFRAN) injection 4 mg  4 mg IntraVENous Q4H PRN Care Plan discussed with: Patient and Nurse Total time spent with patient: 30 minutes.  
Micah Garay MD

## 2020-03-27 NOTE — PROGRESS NOTES
[facility] Progress Note Sherrie Silver DO 
  [phone] Office [phone]  Fax 3/27/2020 Assessment & Plan:  
1. Questionable right lower extremity cellulitis. The patient afebrile with a normal white count. The leg is not erythematous. No abscess on CT scan. Uric acid is elevated. We will stop antibiotics and see how patient responds to gout treatment. 2. Immunosuppressed. Chronic prednisone for temporal arteritis 3. Chronic kidney disease. This is stable. Continue to follow. 4. History of Cipro, sulfa, and tetracycline allergies. 5. Diarrhea. CT scan of the abdomen and pelvis was unremarkable. Enteric pathogens panel was negative as well. Very low suspicion for Clostridium difficile at this time. Continue to follow. 6. Diabetes mellitus. Hemoglobin A1c from 2019 was 7.7. 
   
  
 
 
Subjective:  
 
Right ankle pain has improved Objective:  
 
Vitals:  
Visit Vitals /59 Pulse (!) 113 Temp 97.9 °F (36.6 °C) Resp 21 Ht 5' 7\" (1.702 m) Wt 250 lb (113.4 kg) SpO2 94% BMI 39.16 kg/m² Tmax:  Temp (24hrs), Av.1 °F (36.7 °C), Min:97.9 °F (36.6 °C), Max:98.2 °F (36.8 °C) Exam:  
Patient is intubated:  no 
 
Physical Examination:  
General:  Alert, cooperative, no distress Head:  Normocephalic, atraumatic. Eyes:  Conjunctivae clear Neck: Supple Lungs:   No distress. CTA anteriorly Chest wall:    
Heart:  Regular rate and rhythm Abdomen:    Obese, nontender Extremities: Moves all. Right ankle pain with ROM Skin:  No erythema. Several scabbed areas but no purulence or induration Neurologic: CNII-XII intact. Normal strength Labs:     
 
No lab exists for component: ITNL No results for input(s): CPK, CKMB, TROIQ in the last 72 hours. Recent Labs  
  20 
0244 20 
0451 20 
0346 * 134* 134* K 3.7 3.7 3.4*  
 102 102 CO2 20* 24 24 BUN 11 9 9 CREA 1.59* 1.22* 1.22* GLU 87 94 101* MG 1.5*  --   --   
ALB 2.0* 1.9* 1.8* WBC 10.0 8.5 7.1 HGB 11.8 11.6 11.9 HCT 38.4 38.4 38.4  325 313 No results for input(s): INR, PTP, APTT, INREXT, INREXT in the last 72 hours. Needs: urine analysis, urine sodium, protein and creatinine No results found for: Santosh Running Cultures: No results found for: SDES Lab Results Component Value Date/Time Culture result: NO GROWTH 4 DAYS 03/23/2020 08:38 AM  
 Culture result: KLEBSIELLA PNEUMONIAE (A) 07/13/2019 02:05 PM  
 Culture result: KLEBSIELLA PNEUMONIAE (A) 06/28/2019 04:34 PM  
 
 
Radiology:  
 
Medications Current Facility-Administered Medications Medication Dose Route Frequency Last Dose  colchicine tablet 0.6 mg  0.6 mg Oral BID 0.6 mg at 03/27/20 3795  apixaban (ELIQUIS) tablet 5 mg  5 mg Oral BID 5 mg at 03/27/20 1155  oxyCODONE IR (ROXICODONE) tablet 5 mg  5 mg Oral Q4H PRN 5 mg at 03/27/20 4850  prochlorperazine (COMPAZINE) injection 5 mg  5 mg IntraVENous Q6H PRN 5 mg at 03/26/20 2304  aspirin delayed-release tablet 81 mg  81 mg Oral DAILY 81 mg at 03/27/20 5572  DULoxetine (CYMBALTA) capsule 60 mg  60 mg Oral DAILY 60 mg at 03/27/20 5359  ferrous sulfate (SLOW FE) ER tablet 142 mg (Patient Supplied)  142 mg Oral ACB Stopped at 03/27/20 0730  mirtazapine (REMERON) tablet 15 mg  15 mg Oral QHS 15 mg at 03/26/20 2249  pantoprazole (PROTONIX) tablet 40 mg  40 mg Oral ACB 40 mg at 03/27/20 6838  DAPTOmycin (CUBICIN) 700 mg in 0.9% sodium chloride 14 mL IV Syringe  700 mg IntraVENous  mg at 03/26/20 2250  cefepime (MAXIPIME) 2 g in 0.9% sodium chloride (MBP/ADV) 100 mL  2 g IntraVENous Q12H 2 g at 03/27/20 0331  
 sodium chloride (NS) flush 5-40 mL  5-40 mL IntraVENous Q8H 10 mL at 03/26/20 2308  sodium chloride (NS) flush 5-40 mL  5-40 mL IntraVENous PRN    
 insulin lispro (HUMALOG) injection   SubCUTAneous AC&HS Stopped at 03/23/20 2200  
 glucose chewable tablet 16 g  4 Tab Oral PRN    
 glucagon (GLUCAGEN) injection 1 mg  1 mg IntraMUSCular PRN    
 dextrose 10% infusion 0-250 mL  0-250 mL IntraVENous PRN    
 0.9% sodium chloride with KCl 20 mEq/L infusion   IntraVENous CONTINUOUS    
 acetaminophen (TYLENOL) tablet 325 mg  325 mg Oral Q4H  mg at 03/25/20 0750  ondansetron (ZOFRAN) injection 4 mg  4 mg IntraVENous Q4H PRN 4 mg at 03/26/20 2029 Case discussed with: [facility]

## 2020-03-27 NOTE — ROUTINE PROCESS
Bedside shift change report given to Nikki Hernandez (oncoming nurse) by Jese Lagos (offgoing nurse). Report included the following information SBAR, Kardex, Intake/Output, MAR, Accordion, Recent Results and Med Rec Status.

## 2020-03-27 NOTE — ROUTINE PROCESS
1224 - Patient's IV infiltrated and the other was pulled out by patient. Notified Dr. Uvaldo Rudolph. Midline ordered since patient is receiving IV abx. Notified PICC team.  
 
5854 - PICC team unsuccessful. Patient very anxious and refused to let PICC team try again after they had made first attempt. Does not want anyone to stick her again. Will notify physician.

## 2020-03-28 NOTE — PROGRESS NOTES
Daily Progress Note: 3/28/2020 April Rancho Tristan MD 
 
Assessment/Plan:  
Cellulitis of bilateral legs(3/23/2020). She was given Zosyn and clindamycin in ER.   
- consulted ID and ID broadened antibiotics to Cefepime and Vanc 3/25 and antibiotics DCed 3/27 Lactic Acidosis/SIRS due to bilat LE cellulitis - Lactic acid 4.4 at admission  
- resolved (2.2 3/23) 
  
Diarrhea (3/23/2020)/ Abdominal pain (6/27/2018). CT of the abdomen as under Data Review 
- stool studies. Pain management and IV fluids for now 
  
Hyponatremia (3/23/2020)/ Hypokalemia (3/23/2020). This is most likely secondary to diarrhea. Replete potassium as needed and continue normal saline IV fluids. 
  
CAD (coronary artery disease) (10/9/2015)/chronic Atrial fibrillation ([facility] (11/16/2018)/Essential hypertension (1/22/2016). Continue home medications and monitor on remote telemetry.  
  
Type II diabetes mellitus ([facility] (10/9/2015). Continue home medication and cover with sliding scale. Diabetic diet 
  
Sleep apnea (1/5/2019). Uses CPAP at home. May continue to use while she is here. OPD (chronic obstructive pulmonary disease) ([facility] (7/13/2019). Not wheezing stable. continue nebulizer treatments 
  
Severe obesity ([facility] (7/30/2019). Would benefit from weight loss. Hypokalemia 
- monitor and replace as needed  
 
Elevated CRP and Uric acid 
- empiric trial of Colchicine Problem List: 
Problem List as of 3/28/2020 Date Reviewed: 8/1/2019 Codes Class Noted - Resolved * (Principal) Cellulitis ICD-10-CM: L03.90 ICD-9-CM: 682.9  3/23/2020 - Present Hypokalemia ICD-10-CM: E87.6 ICD-9-CM: 276.8  3/23/2020 - Present Hyponatremia ICD-10-CM: E87.1 ICD-9-CM: 276.1  3/23/2020 - Present Diarrhea ICD-10-CM: R19.7 ICD-9-CM: 787.91  3/23/2020 - Present Cellulitis, leg ICD-10-CM: L03.119 ICD-9-CM: 682.6  3/23/2020 - Present ASO (arteriosclerosis obliterans) ICD-10-CM: I70.90 ICD-9-CM: 440.9  9/5/2019 - Present PVD (peripheral vascular disease) ([facility] ICD-10-CM: I73.9 ICD-9-CM: 443.9  8/1/2019 - Present Severe obesity ([facility] ICD-10-CM: E66.01 
ICD-9-CM: 278.01  7/30/2019 - Present Syncope and collapse ICD-10-CM: R55 
ICD-9-CM: 780.2  7/13/2019 - Present UTI (urinary tract infection) ICD-10-CM: N39.0 ICD-9-CM: 599.0  7/13/2019 - Present COPD (chronic obstructive pulmonary disease) (HCC) ICD-10-CM: J44.9 ICD-9-CM: 356  7/13/2019 - Present Sepsis ([facility] ICD-10-CM: A41.9 ICD-9-CM: 038.9, 995.91  7/13/2019 - Present Normocytic anemia ICD-10-CM: D64.9 ICD-9-CM: 285.9  7/13/2019 - Present PAD (peripheral artery disease) (HCC) ICD-10-CM: I73.9 ICD-9-CM: 443.9  7/13/2019 - Present Leg wound, left ICD-10-CM: P45.845X ICD-9-CM: 891.0  7/13/2019 - Present Leg laceration, right, initial encounter ICD-10-CM: O06.175J ICD-9-CM: 894.0  7/13/2019 - Present Cellulitis of right upper arm ICD-10-CM: L03.113 ICD-9-CM: 682.3  5/17/2019 - Present Mild intermittent asthma ICD-10-CM: J45.20 ICD-9-CM: 493.90  5/17/2019 - Present Gangrene of finger of right hand [facility]) ICD-10-CM: E77 
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 ICD-10-CM: A45.781 ICD-9-CM: 560.9  1/8/2019 - Present Partial small bowel obstruction ([facility] ICD-10-CM: K56.600 ICD-9-CM: 560.9  1/5/2019 - Present Sleep apnea ICD-10-CM: G47.30 ICD-9-CM: 780.57  1/5/2019 - Present Overview Signed 1/5/2019  8:41 PM by Colletta Mourning, DO  
  wears CPAP Atrial fibrillation [facility]) ICD-10-CM: I48.91 
ICD-9-CM: 427.31  11/16/2018 - Present Dyspnea ICD-10-CM: R06.00 
ICD-9-CM: 786.09  11/13/2018 - Present ARF (acute renal failure) (HCC) ICD-10-CM: N17.9 ICD-9-CM: 584.9  11/13/2018 - Present Obesity (BMI 30-39.9) (Chronic) ICD-10-CM: L02.3 ICD-9-CM: 278.00  11/13/2018 - Present Closed wedge compression fracture of T7 vertebra ([facility] ICD-10-CM: T89.103F ICD-9-CM: 805.2  11/13/2018 - Present Type 2 diabetes with nephropathy ([facility] ICD-10-CM: E11.21 
ICD-9-CM: 250.40, 583.81  10/1/2018 - Present Chest pain ICD-10-CM: R07.9 ICD-9-CM: 786.50  6/27/2018 - Present Abdominal pain ICD-10-CM: R10.9 ICD-9-CM: 789.00  6/27/2018 - Present Recurrent deep vein thrombosis (DVT) (HCC) ICD-10-CM: I82.409 ICD-9-CM: 453.40  3/30/2018 - Present Chronic anticoagulation (Chronic) ICD-10-CM: Z79.01 
ICD-9-CM: V58.61  3/30/2018 - Present Coronary artery disease involving native coronary artery without angina pectoris (Chronic) ICD-10-CM: I25.10 ICD-9-CM: 414.01  1/22/2016 - Present Essential hypertension (Chronic) ICD-10-CM: I10 
ICD-9-CM: 401.9  1/22/2016 - Present CAD (coronary artery disease) ICD-10-CM: I25.10 ICD-9-CM: 414.00  10/9/2015 - Present Type II diabetes mellitus (HCC) (Chronic) ICD-10-CM: E11.9 ICD-9-CM: 250.00  10/9/2015 - Present NSTEMI (non-ST elevated myocardial infarction) ([facility] ICD-10-CM: I21.4 ICD-9-CM: 410.70  10/9/2015 - Present RESOLVED: HTN (hypertension) ICD-10-CM: I10 
ICD-9-CM: 401.9  10/9/2015 - 1/22/2016 Subjective:  
  69 y.o.  female who is admitted with bilateral leg cellulitis. Ms. Jose Felton with past medical history of diabetes mellitus, CAD, GERD, DVT, hypertension, CAD, obesity, HAYES presented to ER complaining of abdominal pain, diarrhea, and poor appetite, which started about 2 weeks ago. The abdominal pain is generalized, sharp mild to moderate intensity associated with watery diarrhea.   Last diarrhea was this morning which was watery. Denies fever. Patient has not been eating much the last 2 weeks. Denies shortness of breath, but has chronic cough. Has been having bilateral leg swelling, redness and pain. (Dr Burns Laser 
 
3/24:  Feeling better this AM.  Nausea/vomiting resolved. No loose stools so far this AM.  Lactic acid back in normal range. WBC ok. Cultures neg so far. Tmax 99.4. K+ still low - replacing.  
 
3/25:  Feeling worse this AM.  Nausea has returned. C/O more pain in bilat LEs. Redness and heat in LEs has not improved. May add Vanc - will get ID to see and comment. Needs her home CPAP before giving more pain meds. K+ improving.   
 
3/26:  Feeling some better today - less nausea and Percocet has helped the pain. Also only one loose stool overnight per nurses. She has her home CPAP. ID has seen and antibiotics changed to [address]. Afeb. WBC ok. Ortho consulted also and has seen. CT of RLE without abscess. Checking uric acid. 3/27:  Still c/o severe pain but now more localized to right ankle. Less show less discoloration and are not as warm. CRP and Uric acid up a bit - will try empiric colchicine - possible side effects discussed. BP a bit lower - monitoring. Only two loose stools overnight.   
 
3/28:  Pain in legs/right ankle \"almost all gone\" with addition of colchicine and restart of steroids - may have had an acute gout episode. Will give another dose of Colchicine today. A little confused overnight per nurses. Only 4 loose stool over last 24 hrs. Tolerating diet. Na+ lower and Creat higher - will consult Nephrology. Still has not been up much. Review of Systems: A comprehensive review of systems was negative except for that written in the HPI. Objective:  
Physical Exam:  
Visit Vitals /62 (BP 1 Location: Left arm, BP Patient Position: At rest) Pulse 100 Temp 97.8 °F (36.6 °C) Resp 20 Ht 5' 7\" (1.702 m) Wt 113.4 kg (250 lb) SpO2 97% BMI 39.16 kg/m² O2 Device: Room air Temp (24hrs), Av.9 °F (36.6 °C), Min:97.6 °F (36.4 °C), Max:98.2 °F (36.8 °C) No intake/output data recorded.  1901 -  0700 In: -  
Out: 383 [AXNBS:778] General:  Alert, obese, cooperative, no distress, appears stated age. Head:  Normocephalic, without obvious abnormality, atraumatic. Eyes:  Conjunctivae/corneas clear. PERRL, EOMs intact. Throat: Lips, mucosa, and tongue moist.. Neck: Supple, symmetrical, trachea midline, no adenopathy, thyroid: no enlargement/tenderness/nodules, no carotid bruit and no JVD. Lungs:   Clear to auscultation bilaterally. Chest wall:  No tenderness or deformity. Heart:  Regular rate and rhythm, S1, S2 normal, no murmur, click, rub or gallop. Abdomen:   Soft, non-tender. Bowel sounds normal. No masses,  No organomegaly. Extremities: no cyanosis. 2+ LE edema. No calf tenderness or cords. Redness and tenderness ant tibial surfaces bilat is less. Heat over right ankle has resolved. No longer has tenderness right ankle. 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 Skin:  turgor normal.   
Neurologic: CNII-XII intact. Alert and oriented X 3. Fine motor of hands and fingers normal.   equal.  No cogwheeling or rigidity. Gait not tested at this time. Sensation grossly normal to touch. Gross motor of extremities normal.    
 
Data Review:  
 CT AB and Pelvis 3/23/20:   INDICATION: Abdominal pain, cough, shortness of breath. Exam: Noncontrast CT of the abdomen and pelvis is performed with 5 mm 
collimation. Sagittal and coronal reformatted images were also performed. CT dose reduction was achieved through the use of a standardized protocol 
tailored for this examination and automatic exposure control for dose 
modulation. Direct comparison is made to prior CT dated 2019. FINDINGS: 
There is minimal bibasilar linear scarring. There is a small hiatal hernia, 
unchanged. Abdomen: Liver: There is diffuse fatty infiltration of the liver. Spleen: The spleen is normal on noncontrast images. Adrenals: The adrenals are normal on noncontrast images. Pancreas: The pancreas is normal on noncontrast images. Gallbladder: The gallbladder is surgically absent. Kidneys: The kidneys are atrophic bilaterally. The right kidney measures 7.4 cm 
in sagittal length. Left kidney measures 8.3 cm in sagittal length. There is no 
hydronephrosis. Bowel: No thickened or dilated loop of large or small bowel seen. Appendix: The appendix is normal. 
Pelvis: Urinary bladder is partially filled and grossly normal. 
Bones: The osseous structures are diffusely demineralized. T7 compression 
fracture and kyphoplasty post procedure changes are noted. Miscellaneous: There is a 5.1 cm x 4.5 cm low-attenuation, thin-walled fluid 
collection anterior to the left femoral vessels, not present on prior CT dated January 2019. There is no free intraperitoneal gas or fluid. There is no focal 
intraperitoneal fluid collection to suggest abscess. There is a small 4.5 cm x 
2.8 cm fat-containing periumbilical hernia. The uterus is absent. IMPRESSION:  
1. 5.1 cm x 4.5 cm low-attenuation, thin-walled fluid collection anterior to the 
left femoral vessels, not present on prior CT dated January 2019. Finding likely 
represents a hematoma. Recommend clinical correlation. 2. Small hiatal hernia. 3. Hepatic steatosis. 4. No bowel obstruction, ileus or perforation. No intra-abdominal abscess. CT right LE 3/25/20 EXAM: CT LOW EXT RT W CONT 
 INDICATION: Cellulitis, pain COMPARISON: Duplex ultrasound from the same day CONTRAST: 100 mL of Isovue-300. \ 
 FINDINGS: Bones: Normal bone mineralization. No fracture, dislocation, or 
periosteal reaction. Joint fluid: None. Soft tissue: There is diffuse subcutaneous cutaneous edema without fluid 
collection. There is thickening of the medial and lateral skin along the lower leg. No subcutaneous gas. Skin thickening is also noted along the dorsum of the 
proximal foot. Vascular structures are within normal limits. IMPRESSION: No evidence of fracture, joint effusion, or periosteal reaction. Diffuse skin thickening and subcutaneous edema without fluid collection. Recent Days: 
Recent Labs  
  03/28/20 0420 03/27/20 
0244 03/26/20 
0451 WBC 9.8 10.0 8.5 HGB 12.0 11.8 11.6 HCT 38.9 38.4 38.4  370 325 Recent Labs  
  03/28/20 0420 03/27/20 
0244 03/26/20 
0451 * 130* 134* K 4.0 3.7 3.7  102 102 CO2 18* 20* 24 * 87 94 BUN 16 11 9 CREA 2.75* 1.59* 1.22* CA 7.4* 7.0* 7.0*  
MG  --  1.5*  --   
ALB 1.9* 2.0* 1.9* TBILI 0.8 0.8 0.6 SGOT 90* 96* 85* ALT 14 14 14 No results for input(s): PH, PCO2, PO2, HCO3, FIO2 in the last 72 hours. 24 Hour Results: 
Recent Results (from the past 24 hour(s)) GLUCOSE, POC Collection Time: 03/27/20 11:05 AM  
Result Value Ref Range Glucose (POC) 134 (H) 65 - 100 mg/dL Performed by Yan (PCT) GLUCOSE, POC Collection Time: 03/27/20  4:08 PM  
Result Value Ref Range Glucose (POC) 107 (H) 65 - 100 mg/dL Performed by Yan (PCT) GLUCOSE, POC Collection Time: 03/27/20  9:13 PM  
Result Value Ref Range Glucose (POC) 123 (H) 65 - 100 mg/dL Performed by Lacy Marroquin CBC WITH AUTOMATED DIFF Collection Time: 03/28/20  4:20 AM  
Result Value Ref Range WBC 9.8 3.6 - 11.0 K/uL  
 RBC 3.98 3.80 - 5.20 M/uL  
 HGB 12.0 11.5 - 16.0 g/dL HCT 38.9 35.0 - 47.0 % MCV 97.7 80.0 - 99.0 FL  
 MCH 30.2 26.0 - 34.0 PG  
 MCHC 30.8 30.0 - 36.5 g/dL  
 RDW 17.2 (H) 11.5 - 14.5 % PLATELET 176 236 - 185 K/uL MPV 9.4 8.9 - 12.9 FL  
 NRBC 0.0 0  WBC ABSOLUTE NRBC 0.00 0.00 - 0.01 K/uL NEUTROPHILS 64 32 - 75 % BAND NEUTROPHILS 7 (H) 0 - 6 % LYMPHOCYTES 19 12 - 49 % MONOCYTES 7 5 - 13 % EOSINOPHILS 0 0 - 7 % BASOPHILS 1 0 - 1 % METAMYELOCYTES 1 (H) 0 % MYELOCYTES 1 (H) 0 % IMMATURE GRANULOCYTES 0 %  
 ABS. NEUTROPHILS 7.0 1.8 - 8.0 K/UL  
 ABS. LYMPHOCYTES 1.9 0.8 - 3.5 K/UL  
 ABS. MONOCYTES 0.7 0.0 - 1.0 K/UL  
 ABS. EOSINOPHILS 0.0 0.0 - 0.4 K/UL  
 ABS. BASOPHILS 0.1 0.0 - 0.1 K/UL  
 ABS. IMM. GRANS. 0.0 K/UL  
 DF MANUAL    
 RBC COMMENTS ANISOCYTOSIS 1+ 
    
 RBC COMMENTS POLYCHROMASIA PRESENT 
    
 WBC COMMENTS TOXIC GRANULATION    
METABOLIC PANEL, COMPREHENSIVE Collection Time: 03/28/20  4:20 AM  
Result Value Ref Range Sodium 128 (L) 136 - 145 mmol/L Potassium 4.0 3.5 - 5.1 mmol/L Chloride 100 97 - 108 mmol/L  
 CO2 18 (L) 21 - 32 mmol/L Anion gap 10 5 - 15 mmol/L Glucose 115 (H) 65 - 100 mg/dL BUN 16 6 - 20 MG/DL Creatinine 2.75 (H) 0.55 - 1.02 MG/DL  
 BUN/Creatinine ratio 6 (L) 12 - 20 GFR est AA 21 (L) >60 ml/min/1.73m2 GFR est non-AA 17 (L) >60 ml/min/1.73m2 Calcium 7.4 (L) 8.5 - 10.1 MG/DL Bilirubin, total 0.8 0.2 - 1.0 MG/DL  
 ALT (SGPT) 14 12 - 78 U/L  
 AST (SGOT) 90 (H) 15 - 37 U/L Alk. phosphatase 331 (H) 45 - 117 U/L Protein, total 5.6 (L) 6.4 - 8.2 g/dL Albumin 1.9 (L) 3.5 - 5.0 g/dL Globulin 3.7 2.0 - 4.0 g/dL A-G Ratio 0.5 (L) 1.1 - 2.2 GLUCOSE, POC Collection Time: 03/28/20  8:23 AM  
Result Value Ref Range Glucose (POC) 97 65 - 100 mg/dL Performed by [place] Guo (PCT) Medications reviewed Current Facility-Administered Medications Medication Dose Route Frequency  apixaban (ELIQUIS) tablet 5 mg  5 mg Oral BID  predniSONE (DELTASONE) tablet 20 mg  20 mg Oral BID WITH MEALS  
 oxyCODONE IR (ROXICODONE) tablet 5 mg  5 mg Oral Q4H PRN  prochlorperazine (COMPAZINE) injection 5 mg  5 mg IntraVENous Q6H PRN  
 aspirin delayed-release tablet 81 mg  81 mg Oral DAILY  DULoxetine (CYMBALTA) capsule 60 mg  60 mg Oral DAILY  ferrous sulfate (SLOW FE) ER tablet 142 mg (Patient Supplied)  142 mg Oral ACB  mirtazapine (REMERON) tablet 15 mg  15 mg Oral QHS  pantoprazole (PROTONIX) tablet 40 mg  40 mg Oral ACB  sodium chloride (NS) flush 5-40 mL  5-40 mL IntraVENous Q8H  
 sodium chloride (NS) flush 5-40 mL  5-40 mL IntraVENous PRN  
 insulin lispro (HUMALOG) injection   SubCUTAneous AC&HS  
 glucose chewable tablet 16 g  4 Tab Oral PRN  
 glucagon (GLUCAGEN) injection 1 mg  1 mg IntraMUSCular PRN  
 dextrose 10% infusion 0-250 mL  0-250 mL IntraVENous PRN  
 0.9% sodium chloride with KCl 20 mEq/L infusion   IntraVENous CONTINUOUS  
 acetaminophen (TYLENOL) tablet 325 mg  325 mg Oral Q4H PRN  
 ondansetron (ZOFRAN) injection 4 mg  4 mg IntraVENous Q4H PRN Care Plan discussed with: Patient and Nurse Total time spent with patient: 30 minutes.  
Jeremi Willis MD

## 2020-03-28 NOTE — ROUTINE PROCESS
2210: Educated patient on importance of head of bed elevated to decrease work of breathing. Pt refused. Will continue to monitor. 1337: Rounded on patient, patient had CPAP off of face and patients covers were on the floor. Pt was talking about things that did not make sense. Oriented x3. 
 
9449: Called MD. Orders to get urine culture. Pt had incontinence episode. Unable to obtain. Bladder scan: 205 ml. Cleaned patient up. Patient resting quietly.

## 2020-03-28 NOTE — PROGRESS NOTES
Bedside and Verbal shift change report given to Faviola Feng RN (oncoming nurse) by Livier Law RN (offgoing nurse). Report included the following information SBAR, Kardex and ED Summary.

## 2020-03-29 NOTE — PROGRESS NOTES
Unable to collect urine for urinalysis; patient incontinent. Will notify MD.  
 
MD notified of incontinence and patient noncompliance with idea of straight cath. Dr. Bhupinder Lo states to leave order for urinalysis on chart and if patient is able to provide urine sample at a later date, we can collect then.

## 2020-03-29 NOTE — PROGRESS NOTES
Daily Progress Note: 3/29/2020 Dyke Moritz Cherl Buster, MD 
 
Assessment/Plan:  
Cellulitis of bilateral legs(3/23/2020). She was given Zosyn and clindamycin in ER.   
- consulted ID and ID broadened antibiotics to Cefepime and Vanc 3/25 and antibiotics DCed 3/27 Lactic Acidosis/SIRS due to bilat LE cellulitis - Lactic acid 4.4 at admission  
- resolved (2.2 3/23) 
  
Diarrhea (3/23/2020)/ Abdominal pain (6/27/2018). CT of the abdomen as under Data Review 
- stool studies. Pain management and IV fluids for now 
  
Hyponatremia (3/23/2020)/ Hypokalemia (3/23/2020). This is most likely secondary to diarrhea. Replete potassium as needed and continue normal saline IV fluids. 
  
CAD (coronary artery disease) (10/9/2015)/chronic Atrial fibrillation ([facility] [address] (11/16/2018)/Essential hypertension (1/22/2016). Continue home medications and monitor on remote telemetry.  
  
Type II diabetes mellitus ([facility] [address] (10/9/2015). Continue home medication and cover with sliding scale. Diabetic diet 
  
Sleep apnea (1/5/2019). Uses CPAP at home. May continue to use while she is here. OPD (chronic obstructive pulmonary disease) ([facility] (7/13/2019). Not wheezing stable. continue nebulizer treatments 
  
Severe obesity ([facility] [address] (7/30/2019). Would benefit from weight loss. Hypokalemia 
- monitor and replace as needed  
 
Elevated CRP and Uric acid 
- empiric trial of Colchicine Problem List: 
Problem List as of 3/29/2020 Date Reviewed: 8/1/2019 Codes Class Noted - Resolved * (Principal) Cellulitis ICD-10-CM: L03.90 ICD-9-CM: 682.9  3/23/2020 - Present Hypokalemia ICD-10-CM: E87.6 ICD-9-CM: 276.8  3/23/2020 - Present Hyponatremia ICD-10-CM: E87.1 ICD-9-CM: 276.1  3/23/2020 - Present Diarrhea ICD-10-CM: R19.7 ICD-9-CM: 787.91  3/23/2020 - Present Cellulitis, leg ICD-10-CM: L03.119 ICD-9-CM: 682.6  3/23/2020 - Present ASO (arteriosclerosis obliterans) ICD-10-CM: I70.90 ICD-9-CM: 440.9  9/5/2019 - Present PVD (peripheral vascular disease) ([facility] ICD-10-CM: I73.9 ICD-9-CM: 443.9  8/1/2019 - Present Severe obesity ([facility] ICD-10-CM: E66.01 
ICD-9-CM: 278.01  7/30/2019 - Present Syncope and collapse ICD-10-CM: R55 
ICD-9-CM: 780.2  7/13/2019 - Present UTI (urinary tract infection) ICD-10-CM: N39.0 ICD-9-CM: 599.0  7/13/2019 - Present COPD (chronic obstructive pulmonary disease) (HCC) ICD-10-CM: J44.9 ICD-9-CM: 641  7/13/2019 - Present Sepsis ([facility] ICD-10-CM: A41.9 ICD-9-CM: 038.9, 995.91  7/13/2019 - Present Normocytic anemia ICD-10-CM: D64.9 ICD-9-CM: 285.9  7/13/2019 - Present PAD (peripheral artery disease) (HCC) ICD-10-CM: I73.9 ICD-9-CM: 443.9  7/13/2019 - Present Leg wound, left ICD-10-CM: R75.151U ICD-9-CM: 891.0  7/13/2019 - Present Leg laceration, right, initial encounter ICD-10-CM: D97.590B ICD-9-CM: 894.0  7/13/2019 - Present Cellulitis of right upper arm ICD-10-CM: L03.113 ICD-9-CM: 682.3  5/17/2019 - Present Mild intermittent asthma ICD-10-CM: J45.20 ICD-9-CM: 493.90  5/17/2019 - Present Gangrene of finger of right hand [facility]) ICD-10-CM: M00 
ICD-9-CM: 785.4  5/17/2019 - Present Brachial artery thrombus (HCC) ICD-10-CM: W64.0 ICD-9-CM: 444.21  4/26/2019 - Present Bowel obstruction ([facility] ICD-10-CM: L95.114 ICD-9-CM: 560.9  1/8/2019 - Present Partial small bowel obstruction ([facility] ICD-10-CM: K56.600 ICD-9-CM: 560.9  1/5/2019 - Present Sleep apnea ICD-10-CM: G47.30 ICD-9-CM: 780.57  1/5/2019 - Present Overview Signed 1/5/2019  8:41 PM by Tommie Chang DO  
  wears CPAP Atrial fibrillation [facility]) ICD-10-CM: I48.91 
ICD-9-CM: 427.31  11/16/2018 - Present Dyspnea ICD-10-CM: R06.00 
ICD-9-CM: 786.09  11/13/2018 - Present ARF (acute renal failure) (HCC) ICD-10-CM: N17.9 ICD-9-CM: 584.9  11/13/2018 - Present Obesity (BMI 30-39.9) (Chronic) ICD-10-CM: K41.3 ICD-9-CM: 278.00  11/13/2018 - Present Closed wedge compression fracture of T7 vertebra ([facility] ICD-10-CM: T56.680Q ICD-9-CM: 805.2  11/13/2018 - Present Type 2 diabetes with nephropathy ([facility] ICD-10-CM: E11.21 
ICD-9-CM: 250.40, 583.81  10/1/2018 - Present Chest pain ICD-10-CM: R07.9 ICD-9-CM: 786.50  6/27/2018 - Present Abdominal pain ICD-10-CM: R10.9 ICD-9-CM: 789.00  6/27/2018 - Present Recurrent deep vein thrombosis (DVT) (HCC) ICD-10-CM: I82.409 ICD-9-CM: 453.40  3/30/2018 - Present Chronic anticoagulation (Chronic) ICD-10-CM: Z79.01 
ICD-9-CM: V58.61  3/30/2018 - Present Coronary artery disease involving native coronary artery without angina pectoris (Chronic) ICD-10-CM: I25.10 ICD-9-CM: 414.01  1/22/2016 - Present Essential hypertension (Chronic) ICD-10-CM: I10 
ICD-9-CM: 401.9  1/22/2016 - Present CAD (coronary artery disease) ICD-10-CM: I25.10 ICD-9-CM: 414.00  10/9/2015 - Present Type II diabetes mellitus (HCC) (Chronic) ICD-10-CM: E11.9 ICD-9-CM: 250.00  10/9/2015 - Present NSTEMI (non-ST elevated myocardial infarction) ([facility] ICD-10-CM: I21.4 ICD-9-CM: 410.70  10/9/2015 - Present RESOLVED: HTN (hypertension) ICD-10-CM: I10 
ICD-9-CM: 401.9  10/9/2015 - 1/22/2016 Subjective:  
  69 y.o.  female who is admitted with bilateral leg cellulitis. Ms. Esther Fleming with past medical history of diabetes mellitus, CAD, GERD, DVT, hypertension, CAD, obesity, HAYES presented to ER complaining of abdominal pain, diarrhea, and poor appetite, which started about 2 weeks ago. The abdominal pain is generalized, sharp mild to moderate intensity associated with watery diarrhea.   Last diarrhea was this morning which was watery. Denies fever. Patient has not been eating much the last 2 weeks. Denies shortness of breath, but has chronic cough. Has been having bilateral leg swelling, redness and pain. (Dr Burr Lowers 
 
3/24:  Feeling better this AM.  Nausea/vomiting resolved. No loose stools so far this AM.  Lactic acid back in normal range. WBC ok. Cultures neg so far. Tmax 99.4. K+ still low - replacing.  
 
3/25:  Feeling worse this AM.  Nausea has returned. C/O more pain in bilat LEs. Redness and heat in LEs has not improved. May add Vanc - will get ID to see and comment. Needs her home CPAP before giving more pain meds. K+ improving.   
 
3/26:  Feeling some better today - less nausea and Percocet has helped the pain. Also only one loose stool overnight per nurses. She has her home CPAP. ID has seen and antibiotics changed to [address]. Afeb. WBC ok. Ortho consulted also and has seen. CT of RLE without abscess. Checking uric acid. 3/27:  Still c/o severe pain but now more localized to right ankle. Less show less discoloration and are not as warm. CRP and Uric acid up a bit - will try empiric colchicine - possible side effects discussed. BP a bit lower - monitoring. Only two loose stools overnight.   
 
3/28:  Pain in legs/right ankle \"almost all gone\" with addition of colchicine and restart of steroids - may have had an acute gout episode. Will give another dose of Colchicine today. A little confused overnight per nurses. Only 4 loose stool over last 24 hrs. Tolerating diet. Na+ lower and Creat higher - will consult Nephrology. Still has not been up much.   
 
3/29:  More pain in right ankle this AM.  Nurses report confusion at night off and on. Not wearing CPAP much - discussed need to wear at night. Not using incentive spirom - discussed need to use every hour while awake. Na+ a little better. Creat a little higher. Uric acid 8.6.   Advised need to start getting more mobile. Advised she will likely need rehab at discharge - at this time she is willing to go. Review of Systems: A comprehensive review of systems was negative except for that written in the HPI. Objective:  
Physical Exam:  
Visit Vitals /69 (BP 1 Location: Left arm, BP Patient Position: At rest) Pulse (!) 105 Temp 97.9 °F (36.6 °C) Resp 17 Ht 5' 7\" (1.702 m) Wt 113.4 kg (250 lb) SpO2 97% BMI 39.16 kg/m² O2 Device: Room air Temp (24hrs), Av °F (36.7 °C), Min:97.8 °F (36.6 °C), Max:98.1 °F (36.7 °C) No intake/output data recorded. No intake/output data recorded. General:  Alert, obese, cooperative, no distress, appears stated age. Head:  Normocephalic, without obvious abnormality, atraumatic. Eyes:  Conjunctivae/corneas clear. PERRL, EOMs intact. Throat: Lips, mucosa, and tongue moist.. Neck: Supple, symmetrical, trachea midline, no adenopathy, thyroid: no enlargement/tenderness/nodules, no carotid bruit and no JVD. Lungs:   Clear to auscultation bilaterally. Chest wall:  No tenderness or deformity. Heart:  Regular rate and rhythm, S1, S2 normal, no murmur, click, rub or gallop. Abdomen:   Soft, non-tender. Bowel sounds normal. No masses,  No organomegaly. Extremities: no cyanosis. 2+ LE edema. No calf tenderness or cords. Redness and tenderness ant tibial surfaces bilat is much less. Heat over right ankle has returned but is mild. moderate tenderness right ankle this AM.  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 Skin:  turgor normal.   
Neurologic: CNII-XII intact. Alert and oriented X 3. Fine motor of hands and fingers normal.   equal.  No cogwheeling or rigidity. Gait not tested at this time. Sensation grossly normal to touch. Gross motor of extremities normal.    
 
Data Review:  
 CT AB and Pelvis 3/23/20:   INDICATION: Abdominal pain, cough, shortness of breath. Exam: Noncontrast CT of the abdomen and pelvis is performed with 5 mm 
collimation. Sagittal and coronal reformatted images were also performed. CT dose reduction was achieved through the use of a standardized protocol 
tailored for this examination and automatic exposure control for dose 
modulation. Direct comparison is made to prior CT dated January 2019. FINDINGS: 
There is minimal bibasilar linear scarring. There is a small hiatal hernia, 
unchanged. Abdomen:  
Liver: There is diffuse fatty infiltration of the liver. Spleen: The spleen is normal on noncontrast images. Adrenals: The adrenals are normal on noncontrast images. Pancreas: The pancreas is normal on noncontrast images. Gallbladder: The gallbladder is surgically absent. Kidneys: The kidneys are atrophic bilaterally. The right kidney measures 7.4 cm 
in sagittal length. Left kidney measures 8.3 cm in sagittal length. There is no 
hydronephrosis. Bowel: No thickened or dilated loop of large or small bowel seen. Appendix: The appendix is normal. 
Pelvis: Urinary bladder is partially filled and grossly normal. 
Bones: The osseous structures are diffusely demineralized. T7 compression 
fracture and kyphoplasty post procedure changes are noted. Miscellaneous: There is a 5.1 cm x 4.5 cm low-attenuation, thin-walled fluid 
collection anterior to the left femoral vessels, not present on prior CT dated January 2019. There is no free intraperitoneal gas or fluid. There is no focal 
intraperitoneal fluid collection to suggest abscess. There is a small 4.5 cm x 
2.8 cm fat-containing periumbilical hernia. The uterus is absent. IMPRESSION:  
1. 5.1 cm x 4.5 cm low-attenuation, thin-walled fluid collection anterior to the 
left femoral vessels, not present on prior CT dated January 2019. Finding likely 
represents a hematoma. Recommend clinical correlation. 2. Small hiatal hernia. 3. Hepatic steatosis. 4. No bowel obstruction, ileus or perforation. No intra-abdominal abscess. CT right LE 3/25/20 EXAM: CT LOW EXT RT W CONT 
 INDICATION: Cellulitis, pain COMPARISON: Duplex ultrasound from the same day CONTRAST: 100 mL of Isovue-300. \ 
 FINDINGS: Bones: Normal bone mineralization. No fracture, dislocation, or 
periosteal reaction. Joint fluid: None. Soft tissue: There is diffuse subcutaneous cutaneous edema without fluid 
collection. There is thickening of the medial and lateral skin along the lower 
leg. No subcutaneous gas. Skin thickening is also noted along the dorsum of the 
proximal foot. Vascular structures are within normal limits. IMPRESSION: No evidence of fracture, joint effusion, or periosteal reaction. Diffuse skin thickening and subcutaneous edema without fluid collection. Recent Days: 
Recent Labs  
  03/29/20 
0530 03/28/20 
0420 03/27/20 
0244 WBC 9.4 9.8 10.0 HGB 11.2* 12.0 11.8 HCT 36.4 38.9 38.4  394 370 Recent Labs  
  03/29/20 
0530 03/28/20 
0420 03/27/20 
0244 * 128* 130*  
K 3.9 4.0 3.7  100 102 CO2 16* 18* 20* * 115* 87 BUN 21* 16 11 CREA 2.82* 2.75* 1.59* CA 7.0* 7.4* 7.0*  
MG  --   --  1.5* ALB 1.8* 1.9* 2.0*  
TBILI 0.8 0.8 0.8 SGOT 66* 90* 96* ALT 15 14 14 Lab Results Component Value Date/Time Uric acid 8.6 (H) 03/29/2020 05:30 AM  
 
No results for input(s): PH, PCO2, PO2, HCO3, FIO2 in the last 72 hours. 24 Hour Results: 
Recent Results (from the past 24 hour(s)) GLUCOSE, POC Collection Time: 03/28/20  8:23 AM  
Result Value Ref Range Glucose (POC) 97 65 - 100 mg/dL Performed by [employer] (PCT) GLUCOSE, POC Collection Time: 03/28/20 11:23 AM  
Result Value Ref Range Glucose (POC) 111 (H) 65 - 100 mg/dL Performed by [employer] (PCT) GLUCOSE, POC Collection Time: 03/28/20  4:34 PM  
Result Value Ref Range Glucose (POC) 136 (H) 65 - 100 mg/dL Performed by Gurinder John (PCT) GLUCOSE, POC Collection Time: 03/28/20  9:34 PM  
Result Value Ref Range Glucose (POC) 133 (H) 65 - 100 mg/dL Performed by Coleman Lovelace (CON) CBC WITH AUTOMATED DIFF Collection Time: 03/29/20  5:30 AM  
Result Value Ref Range WBC 9.4 3.6 - 11.0 K/uL  
 RBC 3.77 (L) 3.80 - 5.20 M/uL  
 HGB 11.2 (L) 11.5 - 16.0 g/dL HCT 36.4 35.0 - 47.0 % MCV 96.6 80.0 - 99.0 FL  
 MCH 29.7 26.0 - 34.0 PG  
 MCHC 30.8 30.0 - 36.5 g/dL  
 RDW 17.3 (H) 11.5 - 14.5 % PLATELET 444 776 - 395 K/uL MPV 9.0 8.9 - 12.9 FL  
 NRBC 0.0 0  WBC ABSOLUTE NRBC 0.00 0.00 - 0.01 K/uL NEUTROPHILS 88 (H) 32 - 75 % BAND NEUTROPHILS 3 % LYMPHOCYTES 4 (L) 12 - 49 % MONOCYTES 3 (L) 5 - 13 % EOSINOPHILS 0 0 - 7 % BASOPHILS 0 0 - 1 % MYELOCYTES 2 % IMMATURE GRANULOCYTES 0 0.0 - 0.5 % ABS. NEUTROPHILS 8.6 (H) 1.8 - 8.0 K/UL  
 ABS. LYMPHOCYTES 0.4 (L) 0.8 - 3.5 K/UL  
 ABS. MONOCYTES 0.3 0.0 - 1.0 K/UL  
 ABS. EOSINOPHILS 0.0 0.0 - 0.4 K/UL  
 ABS. BASOPHILS 0.0 0.0 - 0.1 K/UL  
 ABS. IMM. GRANS. 0.0 0.00 - 0.04 K/UL  
 DF MANUAL    
 RBC COMMENTS ANISOCYTOSIS 
1+ WBC COMMENTS VACUOLATED POLYS METABOLIC PANEL, COMPREHENSIVE Collection Time: 03/29/20  5:30 AM  
Result Value Ref Range Sodium 130 (L) 136 - 145 mmol/L Potassium 3.9 3.5 - 5.1 mmol/L Chloride 102 97 - 108 mmol/L  
 CO2 16 (L) 21 - 32 mmol/L Anion gap 12 5 - 15 mmol/L Glucose 137 (H) 65 - 100 mg/dL BUN 21 (H) 6 - 20 MG/DL Creatinine 2.82 (H) 0.55 - 1.02 MG/DL  
 BUN/Creatinine ratio 7 (L) 12 - 20 GFR est AA 20 (L) >60 ml/min/1.73m2 GFR est non-AA 16 (L) >60 ml/min/1.73m2 Calcium 7.0 (L) 8.5 - 10.1 MG/DL Bilirubin, total 0.8 0.2 - 1.0 MG/DL  
 ALT (SGPT) 15 12 - 78 U/L  
 AST (SGOT) 66 (H) 15 - 37 U/L Alk. phosphatase 361 (H) 45 - 117 U/L Protein, total 5.5 (L) 6.4 - 8.2 g/dL Albumin 1.8 (L) 3.5 - 5.0 g/dL Globulin 3.7 2.0 - 4.0 g/dL A-G Ratio 0.5 (L) 1.1 - 2.2 URIC ACID Collection Time: 03/29/20  5:30 AM  
Result Value Ref Range Uric acid 8.6 (H) 2.6 - 6.0 MG/DL Medications reviewed Current Facility-Administered Medications Medication Dose Route Frequency  apixaban (ELIQUIS) tablet 5 mg  5 mg Oral BID  predniSONE (DELTASONE) tablet 20 mg  20 mg Oral BID WITH MEALS  
 oxyCODONE IR (ROXICODONE) tablet 5 mg  5 mg Oral Q4H PRN  prochlorperazine (COMPAZINE) injection 5 mg  5 mg IntraVENous Q6H PRN  
 aspirin delayed-release tablet 81 mg  81 mg Oral DAILY  DULoxetine (CYMBALTA) capsule 60 mg  60 mg Oral DAILY  ferrous sulfate (SLOW FE) ER tablet 142 mg (Patient Supplied)  142 mg Oral ACB  mirtazapine (REMERON) tablet 15 mg  15 mg Oral QHS  pantoprazole (PROTONIX) tablet 40 mg  40 mg Oral ACB  sodium chloride (NS) flush 5-40 mL  5-40 mL IntraVENous Q8H  
 sodium chloride (NS) flush 5-40 mL  5-40 mL IntraVENous PRN  
 insulin lispro (HUMALOG) injection   SubCUTAneous AC&HS  
 glucose chewable tablet 16 g  4 Tab Oral PRN  
 glucagon (GLUCAGEN) injection 1 mg  1 mg IntraMUSCular PRN  
 dextrose 10% infusion 0-250 mL  0-250 mL IntraVENous PRN  
 0.9% sodium chloride with KCl 20 mEq/L infusion   IntraVENous CONTINUOUS  
 acetaminophen (TYLENOL) tablet 325 mg  325 mg Oral Q4H PRN  
 ondansetron (ZOFRAN) injection 4 mg  4 mg IntraVENous Q4H PRN Care Plan discussed with: Patient and Nurse Total time spent with patient: 30 minutes.  
Gary Lopez MD

## 2020-03-29 NOTE — PROGRESS NOTES
Spiritual Care Assessment/Progress Note [address] 
 
 
NAME: Alvin Victoriar      MRN: [id number] AGE: 69 y.o. SEX: female [employer] Affiliation: Worship Language: English  
 
3/29/2020     Total Time (in minutes): 8 Spiritual Assessment begun in [facility] 5M1 MED SURG 1 through conversation with: 
  
    [x]Patient        [] Family    [] Friend(s) Reason for Consult: Initial/Spiritual assessment, patient floor Spiritual beliefs Per Previous notes: (Please include comment if needed) [x] Identifies with a leanna tradition:  Congregational   
   [] Supported by a leanna community:        
   [] Claims no spiritual orientation:       
   [] Seeking spiritual identity:            
   [] Adheres to an individual form of spirituality:       
   [] Not able to assess:                   
 
    
Identified resources for coping:  
   [] Prayer                           
   [] Music                  [] Guided Imagery 
   [] Family/friends                 [] Pet visits [] Devotional reading                         [] Unknown 
   [] Other:                                      
 
 
Interventions offered during this visit: (See comments for more details) Patient Interventions: Affirmation of leanna, Iconic (affirming the presence of God/Higher Power), Initial/Spiritual assessment, patient floor, Prayer (assurance of) Plan of Care: 
 
 [] Support spiritual and/or cultural needs  
 [] Support AMD and/or advance care planning process    
 [] Support grieving process 
 [] Coordinate Rites and/or Rituals  
 [] Coordination with community clergy [] No spiritual needs identified at this time 
 [] Detailed Plan of Care below (See Comments)  [] Make referral to Music Therapy 
[] Make referral to Pet Therapy    
[] Make referral to Addiction services 
[] Make referral to [facility] 
[] Make referral to Spiritual Care Partner 
[] No future visits requested [x] Follow up visits as needed Comments: Initial spiritual assessment in 5 Med Surg. Miss Luis F Wu responded but kept her eyes closed. She shared she was doing well. She affirmed her leanna in God as a source of strength for her. She did not specify her beliefs, previous  notes indicate a Gnosticism belief in [place] 1825. She appreciates being kept in prayer. Provided spiritual presence and assurance of prayer. Advised of  Availability. Visited by: Olman Keller., MS., [address] ([id number])

## 2020-03-29 NOTE — PROGRESS NOTES
Bedside shift change report given to [place], RN (oncoming nurse) by Erika Chong RN (offgoing nurse). Report included the following information SBAR and Kardex.

## 2020-03-29 NOTE — PROGRESS NOTES
Pt resting quietly for assessment. No complaints voiced. Just cleansed from loose bm. Skin barrier cream has been applied. Pt disoriented to time and situation. Bed alarm in place. Pt instructed to call for asst. Call light in bed with patient. SR x 3. Monitor pt status throughout shift.

## 2020-03-29 NOTE — ROUTINE PROCESS
Bedside shift change report given to oncoming shift RN (oncoming nurse) by Dianna Mendosa Rn (offgoing nurse). Report included the following information SBAR. Meds, orders, plan of care. Opportunity given to ask questions.

## 2020-03-29 NOTE — CONSULTS
[address] Name:  Melrose Shone 
MR#:  [id number] :  1945 ACCOUNT #:  [de-identified] DATE OF SERVICE:  2020 RENAL CONSULT REASON FOR CONSULTATION:  Acute-on-chronic kidney disease. HISTORY OF PRESENT ILLNESS:  A 72-year-old female with the following medical problems. 1.  Chronic kidney disease with baseline creatinine around 1.2 mg/dL. 2.  Diabetes mellitus. 3.  Coronary artery disease. 4.  GERD. 5.  History of DVT. 6.  Hypertension. 7.  Obesity. 8.  Obstructive sleep apnea. 9.  Edema. 10.  Atrial fibrillation. 11. \"Fibromyalgia. \" 
12. History of CHF. 13.  Status post colostomy with reversal. 
14.  Status post bladder sling. 15.  History of tobacco use (quit). We are asked to see the patient in regards to an elevated serum creatinine, today measured at 2.82 mg/dL. For reference, she was admitted on 2020, creatinine at that time was 1.39 mg/dL. This is a 72-year-old female who was admitted to the hospital on approximately 2020. She is currently being treated for cellulitis of her lower extremities. She had a lactic acid around 4.4 on presentation, she has also had some diarrhea as well. Her serum creatinine on presentation was abnormal, on review of the computerized database indicates a longstanding history of elevated serum creatinine values. She believes that she has seen a nephrologist in the past, but could provide no details. In fact, she is rather poor historian. She says \"heavens knows\" when asked that if she has had history of hematuria or proteinuria. She denies any history of nephrolithiasis. She denies any difficulty initiating or maintaining stream of urine. She does indicate a previous history of smoking. She is unable to provide any history in regard to previous history of transfusion.   Since she has been admitted, she has had a CT scan performed (with contrast) approximately on 03/25/2020. Additionally, she has had episodes of hypotension during this hospital stay. MEDICATIONS:  Current patient medication of note include Eliquis, aspirin, colchicine, Cymbalta, ferrous sulfate, Remeron, pantoprazole, potassium chloride, prednisone, and normal saline solution at 75 mL an hour. SOCIAL HISTORY:  History of tobacco use, quit. FAMILY HISTORY:  Unobtainable. REVIEW OF SYSTEMS:  Essentially unobtainable. PHYSICAL EXAMINATION: 
GENERAL:  Morbidly obese, elderly female, in bed. VITAL SIGNS:  Most recent blood pressure documented is 127/72, pulse 102, temperature 97.6. The lowest blood pressure in the past 24 hours was noted to be 98/62 yesterday afternoon. HEENT:  Head is normocephalic. The eyes show no scleral icterus. NECK:  Without swelling. PULMONARY EXAM:  She is breathing without labor. ABDOMEN:  Morbidly obese. EXTREMITIES:  1-2+ lower extremity edema. NEUROLOGIC:  She is awake and alert. LABORATORY DATA:  Labs of note; sodium 130, potassium 3.9, chloride and bicarb of 102 and 16 with a BUN and creatinine of 21 and 2.82. Her calcium is 7.0, albumin is 1.8. No recent urinalysis were of review; urinalysis in 07/2019 trace positive blood, negative for protein. CT scan of the abdomen and pelvis without contrast (03/23/2020) showed atrophic kidneys bilaterally, right 7.4, left 8.3, no hydronephrosis. ASSESSMENT:  The patient has modest hyponatremia, and this could be related to the use of her Cymbalta, and consideration for discontinuation of the medication are to be given. Additionally, the proton pump inhibitor may be contributory as well. She will be switched to Pepcid, if tolerant. In regards to her acute kidney injury, her creatinine has risen in the phase of her intermittent episodes of hypotension and recent administration (100 mL) of iodine-based contrast media. Both of these may be contributory to her rising creatinine over her baseline. She has chronic kidney disease. She is unable to provide much of a history, but if diabetes is in duration of 7 years plus, particularly when coupled with neuropathy or retinopathy, the probability of diabetic nephropathy has increased rather dramatically. She may need a zamora evaluation sooner if things do not improve. At this point, we will do the following: We will continue with serial labs. Postvoid bladder scan. My partners will check that tomorrow. I have also ordered urinalysis. Thanks for the consult. MD PRIYA Pelaez/[email or web address] 
D:  03/29/2020 10:53 T:  03/29/2020 14:49 JOB #:  [id number]

## 2020-03-29 NOTE — PROGRESS NOTES
Dictated 
walt 
ckd Low Na Edema May need to stop SSRI and PPI if na remains low 
bp has been low; she has had exposure to iv con as well, so both could contrib to rise in SCr over 1.2-14. Baseline - UA 
- PVr 
- serial labs May need zamora eval ckd

## 2020-03-30 NOTE — PROGRESS NOTES
PHYSICAL THERAPY:PT attempted tx  times x 2. Pt was on cell phone texting. Pt did not acknowledge PT or show willingness to work with PT. PT left to give pt time to finish. PT came back 15 minutes later with pt still texting. Nurse was present. PT will follow next treatment day.

## 2020-03-30 NOTE — PROGRESS NOTES
Daily Progress Note: 3/30/2020 Alistair Brown MD 
 
Assessment/Plan:  
Cellulitis of bilateral legs(3/23/2020). She was given Zosyn and clindamycin in ER.   
- consulted ID and ID broadened antibiotics to Cefepime and Vanc 3/25 and antibiotics DCed 3/27 Lactic Acidosis/SIRS due to bilat LE cellulitis - Lactic acid 4.4 at admission  
- resolved (2.2 3/23) 
  
Diarrhea (3/23/2020)/ Abdominal pain (6/27/2018). CT of the abdomen as under Data Review 
- stool studies. Pain management and IV fluids for now 
  
Hyponatremia (3/23/2020)/ Hypokalemia (3/23/2020). This is most likely secondary to diarrhea. Replete potassium as needed and continue normal saline IV fluids. 
  
CAD (coronary artery disease) (10/9/2015)/chronic Atrial fibrillation ([facility] (11/16/2018)/Essential hypertension (1/22/2016). Continue home medications and monitor on remote telemetry.  
  
Type II diabetes mellitus ([facility] (10/9/2015). Continue home medication and cover with sliding scale. Diabetic diet 
  
Sleep apnea (1/5/2019). Uses CPAP at home. May continue to use while she is here. OPD (chronic obstructive pulmonary disease) ([facility] (7/13/2019). Not wheezing stable. continue nebulizer treatments 
  
Severe obesity ([facility] (7/30/2019). Would benefit from weight loss. Hypokalemia 
- monitor and replace as needed  
 
Gout:  Elevated CRP and Uric acid 
- improved with empiric trial of Colchicine - will add Uloric Problem List: 
Problem List as of 3/30/2020 Date Reviewed: 8/1/2019 Codes Class Noted - Resolved * (Principal) Cellulitis ICD-10-CM: L03.90 ICD-9-CM: 682.9  3/23/2020 - Present Hypokalemia ICD-10-CM: E87.6 ICD-9-CM: 276.8  3/23/2020 - Present Hyponatremia ICD-10-CM: E87.1 ICD-9-CM: 276.1  3/23/2020 - Present Diarrhea ICD-10-CM: R19.7 ICD-9-CM: 787.91  3/23/2020 - Present Cellulitis, leg ICD-10-CM: L03.119 ICD-9-CM: 682.6  3/23/2020 - Present ASO (arteriosclerosis obliterans) ICD-10-CM: I70.90 ICD-9-CM: 440.9  9/5/2019 - Present PVD (peripheral vascular disease) ([facility] ICD-10-CM: I73.9 ICD-9-CM: 443.9  8/1/2019 - Present Severe obesity ([facility] ICD-10-CM: E66.01 
ICD-9-CM: 278.01  7/30/2019 - Present Syncope and collapse ICD-10-CM: R55 
ICD-9-CM: 780.2  7/13/2019 - Present UTI (urinary tract infection) ICD-10-CM: N39.0 ICD-9-CM: 599.0  7/13/2019 - Present COPD (chronic obstructive pulmonary disease) (HCC) ICD-10-CM: J44.9 ICD-9-CM: 028  7/13/2019 - Present Sepsis ([facility] ICD-10-CM: A41.9 ICD-9-CM: 038.9, 995.91  7/13/2019 - Present Normocytic anemia ICD-10-CM: D64.9 ICD-9-CM: 285.9  7/13/2019 - Present PAD (peripheral artery disease) (HCC) ICD-10-CM: I73.9 ICD-9-CM: 443.9  7/13/2019 - Present Leg wound, left ICD-10-CM: F19.843R ICD-9-CM: 891.0  7/13/2019 - Present Leg laceration, right, initial encounter ICD-10-CM: R61.784A ICD-9-CM: 894.0  7/13/2019 - Present Cellulitis of right upper arm ICD-10-CM: L03.113 ICD-9-CM: 682.3  5/17/2019 - Present Mild intermittent asthma ICD-10-CM: J45.20 ICD-9-CM: 493.90  5/17/2019 - Present Gangrene of finger of right hand [facility]) ICD-10-CM: C00 
ICD-9-CM: 785.4  5/17/2019 - Present Brachial artery thrombus (HCC) ICD-10-CM: C44.6 ICD-9-CM: 444.21  4/26/2019 - Present Bowel obstruction ([facility] ICD-10-CM: X81.048 ICD-9-CM: 560.9  1/8/2019 - Present Partial small bowel obstruction ([facility] ICD-10-CM: K56.600 ICD-9-CM: 560.9  1/5/2019 - Present Sleep apnea ICD-10-CM: G47.30 ICD-9-CM: 780.57  1/5/2019 - Present Overview Signed 1/5/2019  8:41 PM by Beto Encarnacion DO  
  wears CPAP Atrial fibrillation [facility]) ICD-10-CM: I48.91 
ICD-9-CM: 427.31  11/16/2018 - Present Dyspnea ICD-10-CM: R06.00 
ICD-9-CM: 786.09  11/13/2018 - Present ARF (acute renal failure) (HCC) ICD-10-CM: N17.9 ICD-9-CM: 584.9  11/13/2018 - Present Obesity (BMI 30-39.9) (Chronic) ICD-10-CM: F82.7 ICD-9-CM: 278.00  11/13/2018 - Present Closed wedge compression fracture of T7 vertebra ([facility] ICD-10-CM: K50.554Z ICD-9-CM: 805.2  11/13/2018 - Present Type 2 diabetes with nephropathy ([facility] ICD-10-CM: E11.21 
ICD-9-CM: 250.40, 583.81  10/1/2018 - Present Chest pain ICD-10-CM: R07.9 ICD-9-CM: 786.50  6/27/2018 - Present Abdominal pain ICD-10-CM: R10.9 ICD-9-CM: 789.00  6/27/2018 - Present Recurrent deep vein thrombosis (DVT) (HCC) ICD-10-CM: I82.409 ICD-9-CM: 453.40  3/30/2018 - Present Chronic anticoagulation (Chronic) ICD-10-CM: Z79.01 
ICD-9-CM: V58.61  3/30/2018 - Present Coronary artery disease involving native coronary artery without angina pectoris (Chronic) ICD-10-CM: I25.10 ICD-9-CM: 414.01  1/22/2016 - Present Essential hypertension (Chronic) ICD-10-CM: I10 
ICD-9-CM: 401.9  1/22/2016 - Present CAD (coronary artery disease) ICD-10-CM: I25.10 ICD-9-CM: 414.00  10/9/2015 - Present Type II diabetes mellitus (HCC) (Chronic) ICD-10-CM: E11.9 ICD-9-CM: 250.00  10/9/2015 - Present NSTEMI (non-ST elevated myocardial infarction) ([facility] ICD-10-CM: I21.4 ICD-9-CM: 410.70  10/9/2015 - Present RESOLVED: HTN (hypertension) ICD-10-CM: I10 
ICD-9-CM: 401.9  10/9/2015 - 1/22/2016 Subjective:  
  69 y.o.  female who is admitted with bilateral leg cellulitis. Ms. Tereza Quezada with past medical history of diabetes mellitus, CAD, GERD, DVT, hypertension, CAD, obesity, HAYES presented to ER complaining of abdominal pain, diarrhea, and poor appetite, which started about 2 weeks ago. The abdominal pain is generalized, sharp mild to moderate intensity associated with watery diarrhea.   Last diarrhea was this morning which was watery. Denies fever. Patient has not been eating much the last 2 weeks. Denies shortness of breath, but has chronic cough. Has been having bilateral leg swelling, redness and pain. (Dr Celi Duran 
 
3/24:  Feeling better this AM.  Nausea/vomiting resolved. No loose stools so far this AM.  Lactic acid back in normal range. WBC ok. Cultures neg so far. Tmax 99.4. K+ still low - replacing.  
 
3/25:  Feeling worse this AM.  Nausea has returned. C/O more pain in bilat LEs. Redness and heat in LEs has not improved. May add Vanc - will get ID to see and comment. Needs her home CPAP before giving more pain meds. K+ improving.   
 
3/26:  Feeling some better today - less nausea and Percocet has helped the pain. Also only one loose stool overnight per nurses. She has her home CPAP. ID has seen and antibiotics changed to [address]. Afeb. WBC ok. Ortho consulted also and has seen. CT of RLE without abscess. Checking uric acid. 3/27:  Still c/o severe pain but now more localized to right ankle. Less show less discoloration and are not as warm. CRP and Uric acid up a bit - will try empiric colchicine - possible side effects discussed. BP a bit lower - monitoring. Only two loose stools overnight.   
 
3/28:  Pain in legs/right ankle \"almost all gone\" with addition of colchicine and restart of steroids - may have had an acute gout episode. Will give another dose of Colchicine today. A little confused overnight per nurses. Only 4 loose stool over last 24 hrs. Tolerating diet. Na+ lower and Creat higher - will consult Nephrology. Still has not been up much.   
 
3/29:  More pain in right ankle this AM.  Nurses report confusion at night off and on. Not wearing CPAP much - discussed need to wear at night. Not using incentive spirom - discussed need to use every hour while awake. Na+ a little better. Creat a little higher. Uric acid 8.6.   Advised need to start getting more mobile. Advised she will likely need rehab at discharge - at this time she is willing to go.   
 
3/30:  Ankle and lower leg pain has resolved. Uric acid in 9s - definitely gout. She is feeling better. She is now ready for [facility] rehab. Will add Uloric. Case Management for placement. Nephro has seen. Creat is a little better. Review of Systems: A comprehensive review of systems was negative except for that written in the HPI. Objective:  
Physical Exam:  
Visit Vitals /73 (BP 1 Location: Left arm, BP Patient Position: At rest) Pulse (!) 105 Temp 98 °F (36.7 °C) Resp 18 Ht 5' 7\" (1.702 m) Wt 113.4 kg (250 lb) SpO2 94% BMI 39.16 kg/m² O2 Device: Room air Temp (24hrs), Av.7 °F (36.5 °C), Min:97.3 °F (36.3 °C), Max:98.1 °F (36.7 °C) No intake/output data recorded. No intake/output data recorded. General:  Alert, obese, cooperative, no distress, appears stated age. Head:  Normocephalic, without obvious abnormality, atraumatic. Eyes:  Conjunctivae/corneas clear. PERRL, EOMs intact. Throat: Lips, mucosa, and tongue moist.. Neck: Supple, symmetrical, trachea midline, no adenopathy, thyroid: no enlargement/tenderness/nodules, no carotid bruit and no JVD. Lungs:   Clear to auscultation bilaterally. Chest wall:  No tenderness or deformity. Heart:  Regular rate and rhythm, S1, S2 normal, no murmur, click, rub or gallop. Abdomen:   Soft, non-tender. Bowel sounds normal. No masses,  No organomegaly. Extremities: no cyanosis. 2+ LE edema. No calf tenderness or cords. Redness and tenderness ant tibial surfaces bilat is much less. Heat over right ankle has resoled. tenderness right ankle has resolved this AM.  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 Skin:  turgor normal.   
Neurologic: CNII-XII intact. Alert and oriented X 3. Fine motor of hands and fingers normal.   equal.  No cogwheeling or rigidity.   Gait not tested at this time. Sensation grossly normal to touch. Gross motor of extremities normal.    
 
Data Review:  
 CT AB and Pelvis 3/23/20:   INDICATION: Abdominal pain, cough, shortness of breath. Exam: Noncontrast CT of the abdomen and pelvis is performed with 5 mm 
collimation. Sagittal and coronal reformatted images were also performed. CT dose reduction was achieved through the use of a standardized protocol 
tailored for this examination and automatic exposure control for dose 
modulation. Direct comparison is made to prior CT dated January 2019. FINDINGS: 
There is minimal bibasilar linear scarring. There is a small hiatal hernia, 
unchanged. Abdomen:  
Liver: There is diffuse fatty infiltration of the liver. Spleen: The spleen is normal on noncontrast images. Adrenals: The adrenals are normal on noncontrast images. Pancreas: The pancreas is normal on noncontrast images. Gallbladder: The gallbladder is surgically absent. Kidneys: The kidneys are atrophic bilaterally. The right kidney measures 7.4 cm 
in sagittal length. Left kidney measures 8.3 cm in sagittal length. There is no 
hydronephrosis. Bowel: No thickened or dilated loop of large or small bowel seen. Appendix: The appendix is normal. 
Pelvis: Urinary bladder is partially filled and grossly normal. 
Bones: The osseous structures are diffusely demineralized. T7 compression 
fracture and kyphoplasty post procedure changes are noted. Miscellaneous: There is a 5.1 cm x 4.5 cm low-attenuation, thin-walled fluid 
collection anterior to the left femoral vessels, not present on prior CT dated January 2019. There is no free intraperitoneal gas or fluid. There is no focal 
intraperitoneal fluid collection to suggest abscess. There is a small 4.5 cm x 
2.8 cm fat-containing periumbilical hernia. The uterus is absent.  
IMPRESSION:  
1. 5.1 cm x 4.5 cm low-attenuation, thin-walled fluid collection anterior to the 
 left femoral vessels, not present on prior CT dated January 2019. Finding likely 
represents a hematoma. Recommend clinical correlation. 2. Small hiatal hernia. 3. Hepatic steatosis. 4. No bowel obstruction, ileus or perforation. No intra-abdominal abscess. CT right LE 3/25/20 EXAM: CT LOW EXT RT W CONT 
 INDICATION: Cellulitis, pain COMPARISON: Duplex ultrasound from the same day CONTRAST: 100 mL of Isovue-300. \ 
 FINDINGS: Bones: Normal bone mineralization. No fracture, dislocation, or 
periosteal reaction. Joint fluid: None. Soft tissue: There is diffuse subcutaneous cutaneous edema without fluid 
collection. There is thickening of the medial and lateral skin along the lower 
leg. No subcutaneous gas. Skin thickening is also noted along the dorsum of the 
proximal foot. Vascular structures are within normal limits. IMPRESSION: No evidence of fracture, joint effusion, or periosteal reaction. Diffuse skin thickening and subcutaneous edema without fluid collection. Recent Days: 
Recent Labs  
  03/30/20 
0610 03/29/20 
0530 03/28/20 
0420 WBC 9.4 9.4 9.8 HGB 10.9* 11.2* 12.0 HCT 35.8 36.4 38.9 * 383 394 Recent Labs  
  03/30/20 
0610 03/29/20 
0530 03/28/20 
0420 * 130* 128* K 4.0 3.9 4.0  
 102 100 CO2 17* 16* 18* * 137* 115* BUN 28* 21* 16  
CREA 2.47* 2.82* 2.75* CA 7.3* 7.0* 7.4* ALB 1.9* 1.8* 1.9* TBILI 0.7 0.8 0.8 SGOT 70* 66* 90* ALT 20 15 14 Lab Results Component Value Date/Time Uric acid 9.2 (H) 03/30/2020 06:10 AM  
 
No results for input(s): PH, PCO2, PO2, HCO3, FIO2 in the last 72 hours. 24 Hour Results: 
Recent Results (from the past 24 hour(s)) GLUCOSE, POC Collection Time: 03/29/20  7:55 AM  
Result Value Ref Range Glucose (POC) 136 (H) 65 - 100 mg/dL Performed by Salma Bhatt ([facility]) GLUCOSE, POC Collection Time: 03/29/20 11:48 AM  
Result Value Ref Range Glucose (POC) 128 (H) 65 - 100 mg/dL Performed by Maureen Bai ([facility]) GLUCOSE, POC Collection Time: 03/29/20  4:20 PM  
Result Value Ref Range Glucose (POC) 126 (H) 65 - 100 mg/dL Performed by Maureen Bai ([facility]) GLUCOSE, POC Collection Time: 03/29/20  9:09 PM  
Result Value Ref Range Glucose (POC) 148 (H) 65 - 100 mg/dL Performed by Sangeeta Rodney ([facility]) CBC WITH AUTOMATED DIFF Collection Time: 03/30/20  6:10 AM  
Result Value Ref Range WBC 9.4 3.6 - 11.0 K/uL  
 RBC 3.67 (L) 3.80 - 5.20 M/uL  
 HGB 10.9 (L) 11.5 - 16.0 g/dL HCT 35.8 35.0 - 47.0 % MCV 97.5 80.0 - 99.0 FL  
 MCH 29.7 26.0 - 34.0 PG  
 MCHC 30.4 30.0 - 36.5 g/dL  
 RDW 17.1 (H) 11.5 - 14.5 % PLATELET 510 (H) 929 - 400 K/uL MPV 9.2 8.9 - 12.9 FL  
 NRBC 0.0 0  WBC ABSOLUTE NRBC 0.00 0.00 - 0.01 K/uL NEUTROPHILS 77 (H) 32 - 75 % BAND NEUTROPHILS 4 % LYMPHOCYTES 10 (L) 12 - 49 % MONOCYTES 6 5 - 13 % EOSINOPHILS 0 0 - 7 % BASOPHILS 0 0 - 1 % METAMYELOCYTES 1 % MYELOCYTES 2 % IMMATURE GRANULOCYTES 0 0.0 - 0.5 % ABS. NEUTROPHILS 7.6 1.8 - 8.0 K/UL  
 ABS. LYMPHOCYTES 0.9 0.8 - 3.5 K/UL  
 ABS. MONOCYTES 0.6 0.0 - 1.0 K/UL  
 ABS. EOSINOPHILS 0.0 0.0 - 0.4 K/UL  
 ABS. BASOPHILS 0.0 0.0 - 0.1 K/UL  
 ABS. IMM. GRANS. 0.0 0.00 - 0.04 K/UL  
 DF MANUAL    
 RBC COMMENTS ANISOCYTOSIS 
1+ WBC COMMENTS TOXIC GRANULATION    
METABOLIC PANEL, COMPREHENSIVE Collection Time: 03/30/20  6:10 AM  
Result Value Ref Range Sodium 134 (L) 136 - 145 mmol/L Potassium 4.0 3.5 - 5.1 mmol/L Chloride 106 97 - 108 mmol/L  
 CO2 17 (L) 21 - 32 mmol/L Anion gap 11 5 - 15 mmol/L Glucose 177 (H) 65 - 100 mg/dL BUN 28 (H) 6 - 20 MG/DL Creatinine 2.47 (H) 0.55 - 1.02 MG/DL  
 BUN/Creatinine ratio 11 (L) 12 - 20 GFR est AA 23 (L) >60 ml/min/1.73m2 GFR est non-AA 19 (L) >60 ml/min/1.73m2 Calcium 7.3 (L) 8.5 - 10.1 MG/DL  Bilirubin, total 0.7 0.2 - 1.0 MG/DL  
 ALT (SGPT) 20 12 - 78 U/L  
 AST (SGOT) 70 (H) 15 - 37 U/L Alk. phosphatase 380 (H) 45 - 117 U/L Protein, total 5.6 (L) 6.4 - 8.2 g/dL Albumin 1.9 (L) 3.5 - 5.0 g/dL Globulin 3.7 2.0 - 4.0 g/dL A-G Ratio 0.5 (L) 1.1 - 2.2 URIC ACID Collection Time: 03/30/20  6:10 AM  
Result Value Ref Range Uric acid 9.2 (H) 2.6 - 6.0 MG/DL Medications reviewed Current Facility-Administered Medications Medication Dose Route Frequency  colchicine tablet 0.6 mg  0.6 mg Oral BID  potassium chloride SR (KLOR-CON 10) tablet 10 mEq  10 mEq Oral BID  
 apixaban (ELIQUIS) tablet 5 mg  5 mg Oral BID  predniSONE (DELTASONE) tablet 20 mg  20 mg Oral BID WITH MEALS  
 oxyCODONE IR (ROXICODONE) tablet 5 mg  5 mg Oral Q4H PRN  prochlorperazine (COMPAZINE) injection 5 mg  5 mg IntraVENous Q6H PRN  
 aspirin delayed-release tablet 81 mg  81 mg Oral DAILY  DULoxetine (CYMBALTA) capsule 60 mg  60 mg Oral DAILY  ferrous sulfate (SLOW FE) ER tablet 142 mg (Patient Supplied)  142 mg Oral ACB  mirtazapine (REMERON) tablet 15 mg  15 mg Oral QHS  pantoprazole (PROTONIX) tablet 40 mg  40 mg Oral ACB  sodium chloride (NS) flush 5-40 mL  5-40 mL IntraVENous Q8H  
 sodium chloride (NS) flush 5-40 mL  5-40 mL IntraVENous PRN  
 insulin lispro (HUMALOG) injection   SubCUTAneous AC&HS  
 glucose chewable tablet 16 g  4 Tab Oral PRN  
 glucagon (GLUCAGEN) injection 1 mg  1 mg IntraMUSCular PRN  
 dextrose 10% infusion 0-250 mL  0-250 mL IntraVENous PRN  
 0.9% sodium chloride with KCl 20 mEq/L infusion   IntraVENous CONTINUOUS  
 acetaminophen (TYLENOL) tablet 325 mg  325 mg Oral Q4H PRN  
 ondansetron (ZOFRAN) injection 4 mg  4 mg IntraVENous Q4H PRN Care Plan discussed with: Patient and Nurse Total time spent with patient: 30 minutes.  
Dicky Kayser, MD

## 2020-03-30 NOTE — PROGRESS NOTES
Bedside and Verbal shift change report given to Elen Solis RN  (oncoming nurse) by Livier Law RN (offgoing nurse). Report included the following information SBAR, Kardex and ED Summary.

## 2020-03-30 NOTE — PROGRESS NOTES
Nutrition Assessment: 
 
RECOMMENDATIONS/INTERVENTION(S):  
1. Continue consistent carb diet. 2. Continue Glucerna 1x/d to promote adequate po intake. 3. Please obtain new weight to accurately assess weight status. 4. Continue to monitor intakes, wt changes, labs, skin. ASSESSMENT:  
3/30: F/u. Pt continues with poor po intake, only a few bites each meal. Says she is drinking Glucerna 1x/d- will continue to send. Per MD noted, pt is ready for rehab and awaiting placement. Will continue to monitor intakes. Labs- Na 134, Mg 1.5, -177-148, Ca 7.3.  
 
3/26: Pt assessed for LOS. Admitted with leg cellulitis. PMH includes CAD, DM, HTN, COPD. BMI 39.1, c/w obesity. Charting remotely, pt did not answer phone. Per H&P, pt c/o abd pain, diarrhea, N/V, and poor appetite/po intake x2 weeks PTA. Per MD note, nausea improved today and pt only had one loose stool overnight. Recorded intake of 0% breakfast this AM. Recorded weights show 6% weight loss x2 months, however CBW in chart is pt stated so unsure accuracy. Need new standing scale or bed weight to accurately assess weight changes. Last A1c in chart is 7.7 from 05/2019, BG in-house have been WNL. Recommend getting new A1c. Per RD notes from previous admissions, pt has received Glucerna in the past- will add BID to promote intake. Will monitor acceptance. Labs- Na 134, Ca 7.0. Meds- cefepime, daptomycin, KCl, Remeron, Protonix, Zofran, Humalog (has not been given). Diet Order: Consistent carb 
% Eaten:   
No data found. Pertinent Medications: [x] Reviewed Labs: [x] Reviewed Anthropometrics: Height: 5' 7\" (170.2 cm) Weight: 113.4 kg (250 lb) IBW (%IBW):   ( ) UBW (%UBW):   (  %) BMI: Body mass index is 39.16 kg/m². This BMI is indicative of: 
 [] Underweight    [] Normal    [] Overweight    [x]  Obesity    []  Extreme Obesity (BMI>40) Estimated Nutrition Needs (Based on): 2177 Kcals/day(1655 x 1. 3 AF) , 113 g(1-1.2 g/kg) Protein Carbohydrate: At Least 130 g/day  Fluids: 2177 mL/day (1 ml/kcal) Last BM: 3/29   [x]Active     []Hyperactive  []Hypoactive       [] Absent   BS Skin:    [] Intact   [] Incision  [] Breakdown   [] DTI   [] Tears/Excoriation/Abrasion  []Edema [x] Other: leg cellulitis Wt Readings from Last 30 Encounters:  
03/23/20 113.4 kg (250 lb) 01/31/20 120.9 kg (266 lb 9.6 oz) 10/31/19 112.9 kg (249 lb) 09/05/19 108.9 kg (240 lb) 08/30/19 109.8 kg (242 lb) 08/01/19 106.1 kg (234 lb)  
07/30/19 106.1 kg (234 lb)  
07/30/19 106.1 kg (234 lb)  
07/14/19 99.8 kg (220 lb)  
06/28/19 101.2 kg (223 lb)  
06/12/19 105 kg (231 lb 7.7 oz) 05/23/19 105.3 kg (232 lb 1.6 oz) 05/13/19 111.1 kg (245 lb) 04/27/19 118 kg (260 lb 3.2 oz)  
03/25/19 108.9 kg (240 lb) 01/07/19 99.3 kg (219 lb)  
11/19/18 99.4 kg (219 lb 3.2 oz)  
11/14/18 110.2 kg (243 lb) 10/19/18 118.8 kg (261 lb 14.5 oz) 10/17/18 112.5 kg (248 lb) 10/01/18 117.7 kg (259 lb 6.4 oz) 08/31/18 111.6 kg (246 lb)  
06/30/18 110.5 kg (243 lb 9.6 oz) 03/30/18 113.4 kg (250 lb) 04/16/17 70.3 kg (155 lb) 04/16/17 70.3 kg (155 lb)  
01/16/17 112 kg (247 lb)  
07/15/16 113.4 kg (250 lb) 02/09/16 117.5 kg (259 lb)  
01/29/16 117 kg (258 lb) NUTRITION DIAGNOSES:  
Problem:  Inadequate oral intake Etiology: related to decreased ability to consume sufficient po intake Signs/Symptoms: as evidenced by poor po intake x2 weeks per pt    
 
3/30: Nutrition dx continues. NUTRITION INTERVENTIONS: 
Meals/Snacks: General/healthful diet   Supplements: Commercial supplement GOAL:  
PO >50% meals + ONS next 2-4 days Cultural, Sikhism, or Ethnic Dietary Needs: None EDUCATION & DISCHARGE NEEDS:  
 [x] None Identified 
 [] Identified and Education Provided/Documented 
 [] Identified and Pt declined/was not appropriate 
  
 [] Interdisciplinary Care Plan Reviewed/Documented  
 [x] Discharge Needs: consistent carb diet [] No Nutrition Related Discharge Needs NUTRITION RISK:  
Pt Is At Nutrition Risk  [x] No Nutrition Risk Identified  [] PT SEEN FOR:  
 []  MD Consult: []Calorie Count []Diabetic Diet Education []Diet Education []Electrolyte Management []General Nutrition Management and Supplements []Management of Tube Feeding []TPN Recommendations []  RN Referral:  []MST score >=2 
   []Enteral/Parenteral Nutrition PTA []Pregnant: Gestational DM or Multigestation  
              [] Pressure Ulcer 
 
[]  Low BMI      []  Length of Stay       [] Dysphagia Diet         [] Ventilator [x]  Follow-up Previous Recommendations: 
 [x] Implemented          [] Not Implemented          [] Not Applicable Previous Goal: 
 [] Met              [x] Progressing Towards Goal              [] Not Progressing Towards Goal   [] Not Applicable Tate Jhaveri RDN Pager [phone] Phone [phone]

## 2020-03-30 NOTE — PROGRESS NOTES
Bedside and Verbal shift change report given to [facility] (oncoming nurse) by Austin Garcia RN (offgoing nurse). Report included the following information SBAR, Kardex, MAR, Accordion and Recent Results.

## 2020-03-30 NOTE — PROGRESS NOTES
Occupational Therapy Note: Attempted OT twice today however both times pt using phone for texting. Both times pt unwilling to work with therapies due to preoccupation with phone. OT will attempt tx next date.

## 2020-03-30 NOTE — PROGRESS NOTES
1531: 
Pt accepted at [facility] for [address] [place]. CXR and DMAS-95 sent in [facility]. Nursing, pt and Dr. Bonny Balderrama notified. Plan for d/c tomorrow afternoon. MAADI Guerrero 
 
CM Note: 
Transitions of Care Plan:    RUR   27 % High Risk 1. CM to follow through for final d/c plan 2. PT/OT recommending snf 3. On IV abx, ID following--needs IV access 4. D/C when stable 5. Outpatient f/u PCP 6. Family to transport at d/c BALTA [facility]

## 2020-03-30 NOTE — PROGRESS NOTES
VAT Note - Following chart for IV access needs after unsuccessful MIDLINE attempt  Friday 3/27/20. Pt now with GFR < 30, Crea > 2.0 and would require Nephrology approval prior to [address] or PICC insertion in interest of vessel preservation. Per chart, pt does not currently have and IV. Per note of Dr. Jazmyn Lino pt may be ready for rehab. Will discuss with pt's nurse this AM for needs. Please call [phone] for questions or concerns. 0514 Spoke to pt's RN regarding need for vascular access and currently pt has no needs.

## 2020-03-30 NOTE — PROGRESS NOTES
[facility] [address] YOB: 1945 Assessment & Plan:  
NAIDA, improved. Due to pre-renal azotemia Probable mild CKD Diarrhea Lactic acidosis Hypokalemia DM Rec: 
Supportive care Avoid nephrotoxins Serial labs May need nephrology f/u Subjective:  
CC: f/u NAIDA 
HPI: Creat improving ROS: some nausea, no vomiting, no sob Current Facility-Administered Medications Medication Dose Route Frequency  febuxostat (ULORIC) tablet 40 mg  40 mg Oral DAILY  metoprolol tartrate (LOPRESSOR) tablet 12.5 mg  12.5 mg Oral BID  potassium chloride SR (KLOR-CON 10) tablet 10 mEq  10 mEq Oral BID  
 apixaban (ELIQUIS) tablet 5 mg  5 mg Oral BID  predniSONE (DELTASONE) tablet 20 mg  20 mg Oral BID WITH MEALS  
 oxyCODONE IR (ROXICODONE) tablet 5 mg  5 mg Oral Q4H PRN  prochlorperazine (COMPAZINE) injection 5 mg  5 mg IntraVENous Q6H PRN  
 aspirin delayed-release tablet 81 mg  81 mg Oral DAILY  DULoxetine (CYMBALTA) capsule 60 mg  60 mg Oral DAILY  ferrous sulfate (SLOW FE) ER tablet 142 mg (Patient Supplied)  142 mg Oral ACB  mirtazapine (REMERON) tablet 15 mg  15 mg Oral QHS  pantoprazole (PROTONIX) tablet 40 mg  40 mg Oral ACB  sodium chloride (NS) flush 5-40 mL  5-40 mL IntraVENous Q8H  
 sodium chloride (NS) flush 5-40 mL  5-40 mL IntraVENous PRN  
 insulin lispro (HUMALOG) injection   SubCUTAneous AC&HS  
 glucose chewable tablet 16 g  4 Tab Oral PRN  
 glucagon (GLUCAGEN) injection 1 mg  1 mg IntraMUSCular PRN  
 dextrose 10% infusion 0-250 mL  0-250 mL IntraVENous PRN  
 0.9% sodium chloride with KCl 20 mEq/L infusion   IntraVENous CONTINUOUS  
 acetaminophen (TYLENOL) tablet 325 mg  325 mg Oral Q4H PRN  
 ondansetron (ZOFRAN) injection 4 mg  4 mg IntraVENous Q4H PRN Objective:  
 
Vitals: 
Blood pressure 137/67, pulse (!) 107, temperature 97.7 °F (36.5 °C), resp. rate 19, height 5' 7\" (1.702 m), weight 113.4 kg (250 lb), SpO2 95 %. Temp (24hrs), Av.7 °F (36.5 °C), Min:97.3 °F (36.3 °C), Max:98.1 °F (36.7 °C) Intake and Output: 
No intake/output data recorded. No intake/output data recorded. Physical Exam:              
GENERAL ASSESSMENT: NAD HEENT:Nontraumatic CHEST: CTA HEART: S1S2 ABDOMEN: Soft,NT 
EXTREMITY: 1+ EDEMA 
 
   
ECG/rhythm: 
 
Data Review No results for input(s): TNIPOC in the last 72 hours. No lab exists for component: ITNL No results for input(s): CPK, CKMB, TROIQ in the last 72 hours. Recent Labs  
  20 
0610 20 
0530 20 
0420 * 130* 128* K 4.0 3.9 4.0  
 102 100 CO2 17* 16* 18*  
BUN 28* 21* 16  
CREA 2.47* 2.82* 2.75* * 137* 115* CA 7.3* 7.0* 7.4* ALB 1.9* 1.8* 1.9* WBC 9.4 9.4 9.8 HGB 10.9* 11.2* 12.0 HCT 35.8 36.4 38.9 * 383 394 No results for input(s): INR, PTP, APTT, INREXT in the last 72 hours. Needs: urine analysis, urine sodium, protein and creatinine No results found for: CAITY BERRY 
 
 
 
: Jean Marie Carter MD 
3/30/2020 [facility]: 
[email or web address] office: 
[address] [place] [address], [address] [place], [address] [facility] Phone: [phone] Fax :     [phone] [place] office: 
[address] [facility], [address] Phone - [phone] Fax - [phone]

## 2020-03-30 NOTE — PROGRESS NOTES
Bedside and Verbal shift change report given to Marivel Valenzuela (oncoming nurse) by Beltran Best RN  (offgoing nurse). Report included the following information SBAR, Kardex and MAR.

## 2020-03-31 NOTE — PROGRESS NOTES
Patient will be transported by [facility] number is [phone] at 3:15.   
CINDI Simpson

## 2020-03-31 NOTE — DISCHARGE SUMMARY
Physician Discharge Summary Patient ID:   
Tanya Mendoza [id number] 
36 y.o. 
1945 Mariya Huerta MD 
 
Admit date: 3/23/2020 Discharge date and time: 3/31/2020 Admission Diagnoses: Cellulitis [L03.90]; Cellulitis, leg [D98.387] Discharge Medications:  
Current Discharge Medication List  
  
START taking these medications Details  
acetaminophen (TYLENOL) 325 mg tablet Take 1 Tab by mouth every four (4) hours as needed for Pain. Qty: 30 Tab, Refills: 0  
  
febuxostat (ULORIC) 40 mg tab tablet Take 1 Tab by mouth daily. Qty: 30 Tab, Refills: 3 CONTINUE these medications which have CHANGED Details  
traMADoL (ULTRAM) 50 mg tablet Take 1 Tab by mouth every six (6) hours as needed for Pain for up to 6 doses. Max Daily Amount: 200 mg. Qty: 6 Tab, Refills: 0 Associated Diagnoses: Idiopathic gout, unspecified chronicity, unspecified site  
  
metoprolol tartrate (LOPRESSOR) 25 mg tablet Take 0.5 Tabs by mouth two (2) times a day. Qty: 30 Tab, Refills: 0 CONTINUE these medications which have NOT CHANGED Details  
aspirin delayed-release 81 mg tablet Take 81 mg by mouth daily. ferrous sulfate (SLOW FE) 142 mg (45 mg iron) ER tablet Take 142 mg by mouth Daily (before breakfast). glipiZIDE SR (GLUCOTROL XL) 10 mg CR tablet Take 10 mg by mouth two (2) times a day. magnesium oxide (MAG-OX) 400 mg tablet Take 400 mg by mouth nightly. potassium chloride SR (KLOR-CON 10) 10 mEq tablet Take 10 mEq by mouth two (2) times a day. predniSONE (DELTASONE) 10 mg tablet Take 10 mg by mouth two (2) times daily (with meals). apixaban (ELIQUIS) 5 mg tablet Take 1 Tab by mouth two (2) times a day. Qty: 60 Tab, Refills: 0 Omeprazole delayed release (PRILOSEC D/R) 20 mg tablet Take 20 mg by mouth two (2) times a day. ibandronate (BONIVA) 150 mg tablet Take 150 mg by mouth every thirty (30) days. promethazine (PHENERGAN) 25 mg tablet Take 25 mg by mouth every eight (8) hours as needed for Nausea (Nausea not relieved by ondansetron). mirtazapine (REMERON) 15 mg tablet Take 15 mg by mouth nightly. tiotropium (SPIRIVA WITH HANDIHALER) 18 mcg inhalation capsule Take 1 Cap by inhalation daily. albuterol (PROVENTIL VENTOLIN) 2.5 mg /3 mL (0.083 %) nebulizer solution 2.5 mg by Nebulization route every six (6) hours as needed for Wheezing or Shortness of Breath. albuterol (PROAIR HFA) 90 mcg/actuation inhaler Take 2 Puffs by inhalation every four (4) hours as needed. lactobacillus rhamnosus gg 10 billion cell (CULTURELLE) 10 billion cell capsule Take 1 Cap by mouth daily. biotin 10,000 mcg cap Take 1 Cap by mouth daily. DULoxetine (CYMBALTA) 60 mg capsule Take 60 mg by mouth daily. cholecalciferol, vitamin D3, (Vitamin D3) 50 mcg (2,000 unit) tab Take 2,000 Units by mouth daily. azelastine-fluticasone (DYMISTA) 137-50 mcg/spray spry 2 Sprays by Nasal route daily. mometasone-formoterol (DULERA) 200-5 mcg/actuation HFA inhaler Take 2 Puffs by inhalation two (2) times a day. Follow up Care: 1. Divine Brown MD with in 1 weeks 2.  specialists as directed. Diet:  Diabetic Diet Disposition: 
SNF. Advanced Directive: 
Discharge Exam: [See today's progress note.] CONSULTATIONS: Nephrology Significant Diagnostic Studies:  
Recent Labs  
  03/31/20 
0400 03/30/20 
0610 WBC 6.8 9.4 HGB 11.5 10.9* HCT 37.0 35.8 * 420* Recent Labs  
  03/31/20 
0400 03/30/20 
0610 03/29/20 
0530 * 134* 130*  
K 4.0 4.0 3.9  106 102 CO2 20* 17* 16*  
BUN 32* 28* 21* CREA 2.20* 2.47* 2.82* * 177* 137* CA 7.8* 7.3* 7.0* URICA  --  9.2* 8.6* Recent Labs  
  03/31/20 
0400 03/30/20 
0610 03/29/20 
0530 SGOT 118* 70* 66* ALT 39 20 15 * 380* 361* TBILI 0.7 0.7 0.8 TP 5.8* 5.6* 5.5* ALB 2.0* 1.9* 1.8*  
 GLOB 3.8 3.7 3.7 Lab Results Component Value Date/Time Glucose (POC) 228 (H) 03/31/2020 07:17 AM  
 Glucose (POC) 209 (H) 03/30/2020 09:27 PM  
 Glucose (POC) 210 (H) 03/30/2020 04:38 PM  
 Glucose (POC) 193 (H) 03/30/2020 11:38 AM  
 Glucose (POC) 170 (H) 03/30/2020 07:28 AM  
 
Lab Results Component Value Date/Time TSH 0.58 11/13/2018 03:26 PM  
 
 
HOSPITAL COURSE & TREATMENT RENDERED:  
1. See today's progress note: 
 
 
Daily Progress Note and Discharge Note: 3/31/2020 Dev Cota MD 
 
Assessment/Plan:  
Cellulitis of bilateral legs(3/23/2020). She was given Zosyn and clindamycin in ER.   
- consulted ID and ID broadened antibiotics to Cefepime and Vanc 3/25 and antibiotics DCed 3/27 Lactic Acidosis/SIRS due to bilat LE cellulitis - Lactic acid 4.4 at admission  
- resolved (2.2 3/23) 
  
Diarrhea (3/23/2020)/ Abdominal pain (6/27/2018). CT of the abdomen as under Data Review 
- stool studies. Pain management and IV fluids for now 
  
Hyponatremia (3/23/2020)/ Hypokalemia (3/23/2020). This is most likely secondary to diarrhea. Replete potassium as needed and continue normal saline IV fluids. 
  
CAD (coronary artery disease) (10/9/2015)/chronic Atrial fibrillation ([facility] (11/16/2018)/Essential hypertension (1/22/2016). Continue home medications and monitor on remote telemetry.  
  
Type II diabetes mellitus ([facility] (10/9/2015). Continue home medication and cover with sliding scale. Diabetic diet 
  
Sleep apnea (1/5/2019). Uses CPAP at home. May continue to use while she is here. OPD (chronic obstructive pulmonary disease) ([facility] (7/13/2019). Not wheezing stable. continue nebulizer treatments 
  
Severe obesity ([facility] (7/30/2019). Would benefit from weight loss. Hypokalemia 
- monitor and replace as needed  
 
Gout:  Elevated CRP and Uric acid 
- improved with empiric trial of Colchicine - will add Uloric Problem List: 
 Problem List as of 3/31/2020 Date Reviewed: 8/1/2019 Codes Class Noted - Resolved * (Principal) Cellulitis ICD-10-CM: L03.90 ICD-9-CM: 682.9  3/23/2020 - Present Hypokalemia ICD-10-CM: E87.6 ICD-9-CM: 276.8  3/23/2020 - Present Hyponatremia ICD-10-CM: E87.1 ICD-9-CM: 276.1  3/23/2020 - Present Diarrhea ICD-10-CM: R19.7 ICD-9-CM: 787.91  3/23/2020 - Present Cellulitis, leg ICD-10-CM: L03.119 ICD-9-CM: 682.6  3/23/2020 - Present ASO (arteriosclerosis obliterans) ICD-10-CM: I70.90 ICD-9-CM: 440.9  9/5/2019 - Present PVD (peripheral vascular disease) ([facility] ICD-10-CM: I73.9 ICD-9-CM: 443.9  8/1/2019 - Present Severe obesity ([facility] ICD-10-CM: E66.01 
ICD-9-CM: 278.01  7/30/2019 - Present Syncope and collapse ICD-10-CM: R55 
ICD-9-CM: 780.2  7/13/2019 - Present UTI (urinary tract infection) ICD-10-CM: N39.0 ICD-9-CM: 599.0  7/13/2019 - Present COPD (chronic obstructive pulmonary disease) ([facility]) ICD-10-CM: J44.9 ICD-9-CM: 071  7/13/2019 - Present Sepsis ([facility] ICD-10-CM: A41.9 ICD-9-CM: 038.9, 995.91  7/13/2019 - Present Normocytic anemia ICD-10-CM: D64.9 ICD-9-CM: 285.9  7/13/2019 - Present PAD (peripheral artery disease) ([facility]) ICD-10-CM: I73.9 ICD-9-CM: 443.9  7/13/2019 - Present Leg wound, left ICD-10-CM: Q38.521B ICD-9-CM: 891.0  7/13/2019 - Present Leg laceration, right, initial encounter ICD-10-CM: D95.698A ICD-9-CM: 894.0  7/13/2019 - Present Cellulitis of right upper arm ICD-10-CM: L03.113 ICD-9-CM: 682.3  5/17/2019 - Present Mild intermittent asthma ICD-10-CM: J45.20 ICD-9-CM: 493.90  5/17/2019 - Present Gangrene of finger of right hand [facility]) ICD-10-CM: S25 
ICD-9-CM: 785.4  5/17/2019 - Present Brachial artery thrombus (HCC) ICD-10-CM: X63.0 ICD-9-CM: 444.21  4/26/2019 - Present Bowel obstruction ([facility] ICD-10-CM: T20.665 ICD-9-CM: 560.9  1/8/2019 - Present Partial small bowel obstruction ([facility] ICD-10-CM: K56.600 ICD-9-CM: 560.9  1/5/2019 - Present Sleep apnea ICD-10-CM: G47.30 ICD-9-CM: 780.57  1/5/2019 - Present Overview Signed 1/5/2019  8:41 PM by Tammy Pastrana DO  
  wears CPAP Atrial fibrillation [facility]) ICD-10-CM: I48.91 
ICD-9-CM: 427.31  11/16/2018 - Present Dyspnea ICD-10-CM: R06.00 
ICD-9-CM: 786.09  11/13/2018 - Present ARF (acute renal failure) (HCC) ICD-10-CM: N17.9 ICD-9-CM: 584.9  11/13/2018 - Present Obesity (BMI 30-39.9) (Chronic) ICD-10-CM: L24.6 ICD-9-CM: 278.00  11/13/2018 - Present Closed wedge compression fracture of T7 vertebra ([facility] ICD-10-CM: P69.148Z ICD-9-CM: 805.2  11/13/2018 - Present Type 2 diabetes with nephropathy ([facility] ICD-10-CM: E11.21 
ICD-9-CM: 250.40, 583.81  10/1/2018 - Present Chest pain ICD-10-CM: R07.9 ICD-9-CM: 786.50  6/27/2018 - Present Abdominal pain ICD-10-CM: R10.9 ICD-9-CM: 789.00  6/27/2018 - Present Recurrent deep vein thrombosis (DVT) (HCC) ICD-10-CM: I82.409 ICD-9-CM: 453.40  3/30/2018 - Present Chronic anticoagulation (Chronic) ICD-10-CM: Z79.01 
ICD-9-CM: V58.61  3/30/2018 - Present Coronary artery disease involving native coronary artery without angina pectoris (Chronic) ICD-10-CM: I25.10 ICD-9-CM: 414.01  1/22/2016 - Present Essential hypertension (Chronic) ICD-10-CM: I10 
ICD-9-CM: 401.9  1/22/2016 - Present CAD (coronary artery disease) ICD-10-CM: I25.10 ICD-9-CM: 414.00  10/9/2015 - Present Type II diabetes mellitus (HCC) (Chronic) ICD-10-CM: E11.9 ICD-9-CM: 250.00  10/9/2015 - Present NSTEMI (non-ST elevated myocardial infarction) ([facility] ICD-10-CM: I21.4 ICD-9-CM: 410.70  10/9/2015 - Present RESOLVED: HTN (hypertension) ICD-10-CM: I10 
ICD-9-CM: 401.9  10/9/2015 - 1/22/2016 Subjective: 69 y.o.  female who is admitted with bilateral leg cellulitis. Ms. Cartwright  with past medical history of diabetes mellitus, CAD, GERD, DVT, hypertension, CAD, obesity, HAYES presented to ER complaining of abdominal pain, diarrhea, and poor appetite, which started about 2 weeks ago. The abdominal pain is generalized, sharp mild to moderate intensity associated with watery diarrhea. Last diarrhea was this morning which was watery. Denies fever. Patient has not been eating much the last 2 weeks. Denies shortness of breath, but has chronic cough. Has been having bilateral leg swelling, redness and pain. (Dr Burr Lowers 
 
3/24:  Feeling better this AM.  Nausea/vomiting resolved. No loose stools so far this AM.  Lactic acid back in normal range. WBC ok. Cultures neg so far. Tmax 99.4. K+ still low - replacing.  
 
3/25:  Feeling worse this AM.  Nausea has returned. C/O more pain in bilat LEs. Redness and heat in LEs has not improved. May add Vanc - will get ID to see and comment. Needs her home CPAP before giving more pain meds. K+ improving.   
 
3/26:  Feeling some better today - less nausea and Percocet has helped the pain. Also only one loose stool overnight per nurses. She has her home CPAP. ID has seen and antibiotics changed to [address]. Afeb. WBC ok. Ortho consulted also and has seen. CT of RLE without abscess. Checking uric acid. 3/27:  Still c/o severe pain but now more localized to right ankle. Less show less discoloration and are not as warm. CRP and Uric acid up a bit - will try empiric colchicine - possible side effects discussed. BP a bit lower - monitoring. Only two loose stools overnight.   
 
3/28:  Pain in legs/right ankle \"almost all gone\" with addition of colchicine and restart of steroids - may have had an acute gout episode. Will give another dose of Colchicine today.   A little confused overnight per nurses. Only 4 loose stool over last 24 hrs. Tolerating diet. Na+ lower and Creat higher - will consult Nephrology. Still has not been up much.   
 
3/29:  More pain in right ankle this AM.  Nurses report confusion at night off and on. Not wearing CPAP much - discussed need to wear at night. Not using incentive spirom - discussed need to use every hour while awake. Na+ a little better. Creat a little higher. Uric acid 8.6. Advised need to start getting more mobile. Advised she will likely need rehab at discharge - at this time she is willing to go.   
 
3/30:  Ankle and lower leg pain has resolved. Uric acid in 9s - definitely gout. She is feeling better. She is now ready for [facility] rehab. Will add Uloric. Case Management for placement. Nephro has seen. Creat is a little better. 3/31:  Reports leg pain has resolved. Likely had cellulitis initially and severe episode of gout. Stable for [facility] rehab. [facility] has accepted pt. Review of Systems: A comprehensive review of systems was negative except for that written in the HPI. Objective:  
Physical Exam:  
Visit Vitals /81 Pulse 99 Temp 98.2 °F (36.8 °C) Resp 16 Ht 5' 7\" (1.702 m) Wt 113.4 kg (250 lb) SpO2 99% BMI 39.16 kg/m² O2 Device: Room air Temp (24hrs), Av.9 °F (36.6 °C), Min:97.4 °F (36.3 °C), Max:98.6 °F (37 °C) No intake/output data recorded.  1901 -  0700 In: 27 [P.O.:30] Out: 475 [Urine:475] General:  Alert, obese, cooperative, no distress, appears stated age. Head:  Normocephalic, without obvious abnormality, atraumatic. Eyes:  Conjunctivae/corneas clear. PERRL, EOMs intact. Throat: Lips, mucosa, and tongue moist.. Neck: Supple, symmetrical, trachea midline, no adenopathy, thyroid: no enlargement/tenderness/nodules, no carotid bruit and no JVD. Lungs:   Clear to auscultation bilaterally. Chest wall:  No tenderness or deformity. Heart:  Regular rate and rhythm, S1, S2 normal, no murmur, click, rub or gallop. Abdomen:   Soft, non-tender. Bowel sounds normal. No masses,  No organomegaly. Extremities: no cyanosis. 2+ LE edema. No calf tenderness or cords. Redness and tenderness ant tibial surfaces bilat is much less. Heat over right ankle has resoled. tenderness right ankle has resolved this AM.  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 Skin:  turgor normal.   
Neurologic: CNII-XII intact. Alert and oriented X 3. Fine motor of hands and fingers normal.   equal.  No cogwheeling or rigidity. Gait not tested at this time. Sensation grossly normal to touch. Gross motor of extremities normal.    
 
Data Review:  
 CT AB and Pelvis 3/23/20:   INDICATION: Abdominal pain, cough, shortness of breath. Exam: Noncontrast CT of the abdomen and pelvis is performed with 5 mm 
collimation. Sagittal and coronal reformatted images were also performed. CT dose reduction was achieved through the use of a standardized protocol 
tailored for this examination and automatic exposure control for dose 
modulation. Direct comparison is made to prior CT dated January 2019. FINDINGS: 
There is minimal bibasilar linear scarring. There is a small hiatal hernia, 
unchanged. Abdomen:  
Liver: There is diffuse fatty infiltration of the liver. Spleen: The spleen is normal on noncontrast images. Adrenals: The adrenals are normal on noncontrast images. Pancreas: The pancreas is normal on noncontrast images. Gallbladder: The gallbladder is surgically absent. Kidneys: The kidneys are atrophic bilaterally. The right kidney measures 7.4 cm 
in sagittal length. Left kidney measures 8.3 cm in sagittal length. There is no 
hydronephrosis. Bowel: No thickened or dilated loop of large or small bowel seen.   
Appendix: The appendix is normal. 
Pelvis: Urinary bladder is partially filled and grossly normal. 
 Bones: The osseous structures are diffusely demineralized. T7 compression 
fracture and kyphoplasty post procedure changes are noted. Miscellaneous: There is a 5.1 cm x 4.5 cm low-attenuation, thin-walled fluid 
collection anterior to the left femoral vessels, not present on prior CT dated January 2019. There is no free intraperitoneal gas or fluid. There is no focal 
intraperitoneal fluid collection to suggest abscess. There is a small 4.5 cm x 
2.8 cm fat-containing periumbilical hernia. The uterus is absent. IMPRESSION:  
1. 5.1 cm x 4.5 cm low-attenuation, thin-walled fluid collection anterior to the 
left femoral vessels, not present on prior CT dated January 2019. Finding likely 
represents a hematoma. Recommend clinical correlation. 2. Small hiatal hernia. 3. Hepatic steatosis. 4. No bowel obstruction, ileus or perforation. No intra-abdominal abscess. CT right LE 3/25/20 EXAM: CT LOW EXT RT W CONT 
 INDICATION: Cellulitis, pain COMPARISON: Duplex ultrasound from the same day CONTRAST: 100 mL of Isovue-300. \ 
 FINDINGS: Bones: Normal bone mineralization. No fracture, dislocation, or 
periosteal reaction. Joint fluid: None. Soft tissue: There is diffuse subcutaneous cutaneous edema without fluid 
collection. There is thickening of the medial and lateral skin along the lower 
leg. No subcutaneous gas. Skin thickening is also noted along the dorsum of the 
proximal foot. Vascular structures are within normal limits. IMPRESSION: No evidence of fracture, joint effusion, or periosteal reaction. Diffuse skin thickening and subcutaneous edema without fluid collection.  
 
Signed: 
Tyrell Durán MD 
3/31/2020 
7:25 AM

## 2020-03-31 NOTE — PROGRESS NOTES
Bedside and Verbal shift change report given to Marivel Valenzuela (oncoming nurse) by Beatriz Casey RN  (offgoing nurse). Report included the following information SBAR, Kardex and MAR.

## 2020-03-31 NOTE — PROGRESS NOTES
Transition of Care Plan to SNF/Rehab 
 
SNF/Rehab Transition: 
Patient has been accepted to [facility]  and meets criteria for admission. Patient will transported by [facility] with a Wheelchair Dlian Clack and expected to leave at 3:15 pm. 
 
Communication to Patient/Family: 
Spoke with Anita Nails patient's daughter and they are agreeable to the transition plan. Communication to SNF/Rehab: 
Bedside RN, Joleen Domínguez, has been notified to update the transition plan to the facility and call report (phone number [phone]). Discharge information has been updated on the AVS. Discharge instructions to be fax'd to facility at [facility] # [phone]). Nursing Please include all hard scripts for controlled substances, med rec and dc summary, and AVS in packet. Reviewed and confirmed with [facility], can manage the patient care needs for the following: SNF/Rehab Transition: 
Patient to follow-up with PCP/Specialist:  
 
Reviewed and confirmed with [facility], [facility] they can manage the patient care needs for the following:  
 
[place] with (X) only those applicable: 
 
Medication: 
[x]  Medications will be available at the facility []  IV Antibiotics []  Controlled Substance - hard copy to be sent with patient  
[]  Weekly Labs Documents: 
[] Hard RX 
[] MAR 
[] Kardex [x] AVS 
[]Transfer Summary 
[x]Discharge Equipment: 
[]  CPAP/BiPAP []  Wound Vacuum 
[]  [place] or Urinary Device 
[]  PICC/Central Line 
[]  Nebulizer 
[]  Ventilator Treatment: 
[]Isolation (for MRSA, VRE, etc.) []Surgical Drain Management []Tracheostomy Care 
[]Dressing Changes []Dialysis with transportation and chair time . []PEG Care []Oxygen []Daily Weights for Heart Failure Dietary: 
[]Any diet limitations []Tube Feedings []Total Parenteral Management (TPN) Eligible for Medicaid Long Term Services and Supports Yes: 
[] Eligible for medical assistance or will become eligible within 180 days and UAI completed. [] Provider/Patient and/or support system has requested screening. [] UAI copy provided to patient or responsible party 
[] UAI unavailable at discharge will send once processed to SNF provider. [] UAI unavailable at discharged mailed to patient No:  
[] Private pay and is not financially eligible for Medicaid within the next 180 days. [] Reside out-of-state. [] A residents of a state owned/operated facility that is licensed 
by [address] [employer] [facility] or [facility] 
[] [facility]rollment in [facility] 
[] [facility] 
[] Patient /Family declines to have screening completed or provide financial information for screening Financial Resources: 
Medicaid   
[] Initiated and application pending  
[] Full coverage Advanced Care Plan: 
[]Surrogate Decision Maker of Care 
[]POA []Communicated Code Status Full (DDNR\", \"Full\") Other Care Management Interventions PCP Verified by CM: Yes Transition of Care Consult (CM Consult): SNF([facility] [place]) Discharge Durable Medical Equipment: (At home: cpap, RW, rollator, shower chair, raised toilet seat) Physical Therapy Consult: Yes Occupational Therapy Consult: Yes Current Support Network: Family Lives Nearby, Assisted Living, Lives Alone Confirm Follow Up Transport: Family Discharge Location Discharge Placement: Skilled nursing facility [facility]

## 2020-03-31 NOTE — PROGRESS NOTES
CMS Note 3/31/2020 Patient received and signed their 2nd IMM letter. Patient was given a copy for their record. Louisa Ernst CMS

## 2020-03-31 NOTE — DISCHARGE INSTRUCTIONS
Patient Discharge Instructions    Pa Simeon / [id number] : 1945    Admitted 3/23/2020 Discharged: 3/31/2020 7:24 AM     ACUTE DIAGNOSES:  Cellulitis [L03.90]  Cellulitis, leg [Y66.630]    CHRONIC MEDICAL DIAGNOSES:  Problem List as of 3/31/2020 Date Reviewed: 2019          Codes Class Noted - Resolved    * (Principal) Cellulitis ICD-10-CM: L03.90  ICD-9-CM: 682.9  3/23/2020 - Present        Hypokalemia ICD-10-CM: E87.6  ICD-9-CM: 276.8  3/23/2020 - Present        Hyponatremia ICD-10-CM: E87.1  ICD-9-CM: 276.1  3/23/2020 - Present        Diarrhea ICD-10-CM: R19.7  ICD-9-CM: 787.91  3/23/2020 - Present        Cellulitis, leg ICD-10-CM: L03.119  ICD-9-CM: 682.6  3/23/2020 - Present        ASO (arteriosclerosis obliterans) ICD-10-CM: I70.90  ICD-9-CM: 440.9  2019 - Present        PVD (peripheral vascular disease) ([facility] ICD-10-CM: I73.9  ICD-9-CM: 443.9  2019 - Present        Severe obesity ([facility] ICD-10-CM: E66.01  ICD-9-CM: 278.01  2019 - Present        Syncope and collapse ICD-10-CM: R55  ICD-9-CM: 780.2  2019 - Present        UTI (urinary tract infection) ICD-10-CM: N39.0  ICD-9-CM: 599.0  2019 - Present        COPD (chronic obstructive pulmonary disease) ([facility] ICD-10-CM: J44.9  ICD-9-CM: 496  2019 - Present        Sepsis ([facility] ICD-10-CM: A41.9  ICD-9-CM: 038.9, 995.91  2019 - Present        Normocytic anemia ICD-10-CM: D64.9  ICD-9-CM: 285.9  2019 - Present        PAD (peripheral artery disease) (HCC) ICD-10-CM: I73.9  ICD-9-CM: 443.9  2019 - Present        Leg wound, left ICD-10-CM: R54.555K  ICD-9-CM: 891.0  2019 - Present        Leg laceration, right, initial encounter ICD-10-CM: C41.130T  ICD-9-CM: 894.0  2019 - Present        Cellulitis of right upper arm ICD-10-CM: L03.113  ICD-9-CM: 682.3  2019 - Present        Mild intermittent asthma ICD-10-CM: J45.20  ICD-9-CM: 493.90  2019 - Present        Gangrene of finger of right hand [facility]) ICD-10-CM: M20  ICD-9-CM: 785.4  5/17/2019 - Present        Brachial artery thrombus (HCC) ICD-10-CM: I74.2  ICD-9-CM: 444.21  4/26/2019 - Present        Bowel obstruction ([facility] ICD-10-CM: J09.684  ICD-9-CM: 560.9  1/8/2019 - Present        Partial small bowel obstruction ([facility] ICD-10-CM: K56.600  ICD-9-CM: 560.9  1/5/2019 - Present        Sleep apnea ICD-10-CM: G47.30  ICD-9-CM: 780.57  1/5/2019 - Present    Overview Signed 1/5/2019  8:41 PM by Enio Islas DO     wears CPAP             Atrial fibrillation ([facility] ICD-10-CM: I48.91  ICD-9-CM: 427.31  11/16/2018 - Present        Dyspnea ICD-10-CM: R06.00  ICD-9-CM: 786.09  11/13/2018 - Present        ARF (acute renal failure) (HCC) ICD-10-CM: N17.9  ICD-9-CM: 584.9  11/13/2018 - Present        Obesity (BMI 30-39.9) (Chronic) ICD-10-CM: E66.9  ICD-9-CM: 278.00  11/13/2018 - Present        Closed wedge compression fracture of T7 vertebra ([facility] ICD-10-CM: E11.760I  ICD-9-CM: 805.2  11/13/2018 - Present        Type 2 diabetes with nephropathy ([facility] ICD-10-CM: E11.21  ICD-9-CM: 250.40, 583.81  10/1/2018 - Present        Chest pain ICD-10-CM: R07.9  ICD-9-CM: 786.50  6/27/2018 - Present        Abdominal pain ICD-10-CM: R10.9  ICD-9-CM: 789.00  6/27/2018 - Present        Recurrent deep vein thrombosis (DVT) ([facility] ICD-10-CM: I82.409  ICD-9-CM: 453.40  3/30/2018 - Present        Chronic anticoagulation (Chronic) ICD-10-CM: Z79.01  ICD-9-CM: V58.61  3/30/2018 - Present        Coronary artery disease involving native coronary artery without angina pectoris (Chronic) ICD-10-CM: I25.10  ICD-9-CM: 414.01  1/22/2016 - Present        Essential hypertension (Chronic) ICD-10-CM: I10  ICD-9-CM: 401.9  1/22/2016 - Present        CAD (coronary artery disease) ICD-10-CM: I25.10  ICD-9-CM: 414.00  10/9/2015 - Present        Type II diabetes mellitus ([facility] (Chronic) ICD-10-CM: E11.9  ICD-9-CM: 250.00  10/9/2015 - Present        NSTEMI (non-ST elevated myocardial infarction) [facility]) ICD-10-CM: I21.4  ICD-9-CM: 410.70  10/9/2015 - Present        RESOLVED: HTN (hypertension) ICD-10-CM: I10  ICD-9-CM: 401.9  10/9/2015 - 1/22/2016              DISCHARGE MEDICATIONS:         · It is important that you take the medication exactly as they are prescribed. · Keep your medication in the bottles provided by the pharmacist and keep a list of the medication names, dosages, and times to be taken in your wallet. · Do not take other medications without consulting your doctor. DIET:  Diabetic Diet    ACTIVITY: Activity as tolerated    ADDITIONAL INFORMATION: If you experience any of the following symptoms then please call your primary care physician or return to the emergency room if you cannot get hold of your doctor: Fever, chills, nausea, vomiting, diarrhea, change in mentation, falling, bleeding, shortness of breath. FOLLOW UP CARE:  Dr. Zainab Tristan MD  you are to call and set up an appointment to see them with in 1 week out of rehab. Follow-up with specialists at directed by them      Information obtained by :  I understand that if any problems occur once I am at home I am to contact my physician. I understand and acknowledge receipt of the instructions indicated above.                                                                                                                                            Physician's or R.N.'s Signature                                                                  Date/Time                                                                                                                                              Patient or Representative Signature                                                          Date/Time

## 2020-03-31 NOTE — PROGRESS NOTES
Daily Progress Note and Discharge Note: 3/31/2020 Yina Narayan MD 
 
Assessment/Plan:  
Cellulitis of bilateral legs(3/23/2020). She was given Zosyn and clindamycin in ER.   
- consulted ID and ID broadened antibiotics to Cefepime and Vanc 3/25 and antibiotics DCed 3/27 Lactic Acidosis/SIRS due to bilat LE cellulitis - Lactic acid 4.4 at admission  
- resolved (2.2 3/23) 
  
Diarrhea (3/23/2020)/ Abdominal pain (6/27/2018). CT of the abdomen as under Data Review 
- stool studies. Pain management and IV fluids for now 
  
Hyponatremia (3/23/2020)/ Hypokalemia (3/23/2020). This is most likely secondary to diarrhea. Replete potassium as needed and continue normal saline IV fluids. 
  
CAD (coronary artery disease) (10/9/2015)/chronic Atrial fibrillation ([facility] (11/16/2018)/Essential hypertension (1/22/2016). Continue home medications and monitor on remote telemetry.  
  
Type II diabetes mellitus ([facility] (10/9/2015). Continue home medication and cover with sliding scale. Diabetic diet 
  
Sleep apnea (1/5/2019). Uses CPAP at home. May continue to use while she is here. OPD (chronic obstructive pulmonary disease) ([facility] (7/13/2019). Not wheezing stable. continue nebulizer treatments 
  
Severe obesity ([facility] (7/30/2019). Would benefit from weight loss. Hypokalemia 
- monitor and replace as needed  
 
Gout:  Elevated CRP and Uric acid 
- improved with empiric trial of Colchicine - will add Uloric Problem List: 
Problem List as of 3/31/2020 Date Reviewed: 8/1/2019 Codes Class Noted - Resolved * (Principal) Cellulitis ICD-10-CM: L03.90 ICD-9-CM: 682.9  3/23/2020 - Present Hypokalemia ICD-10-CM: E87.6 ICD-9-CM: 276.8  3/23/2020 - Present Hyponatremia ICD-10-CM: E87.1 ICD-9-CM: 276.1  3/23/2020 - Present Diarrhea ICD-10-CM: R19.7 ICD-9-CM: 787.91  3/23/2020 - Present  Cellulitis, leg ICD-10-CM: L03.119 
 ICD-9-CM: 682.6  3/23/2020 - Present ASO (arteriosclerosis obliterans) ICD-10-CM: I70.90 ICD-9-CM: 440.9  9/5/2019 - Present PVD (peripheral vascular disease) ([facility] ICD-10-CM: I73.9 ICD-9-CM: 443.9  8/1/2019 - Present Severe obesity ([facility] ICD-10-CM: E66.01 
ICD-9-CM: 278.01  7/30/2019 - Present Syncope and collapse ICD-10-CM: R55 
ICD-9-CM: 780.2  7/13/2019 - Present UTI (urinary tract infection) ICD-10-CM: N39.0 ICD-9-CM: 599.0  7/13/2019 - Present COPD (chronic obstructive pulmonary disease) (HCC) ICD-10-CM: J44.9 ICD-9-CM: 066  7/13/2019 - Present Sepsis ([facility] ICD-10-CM: A41.9 ICD-9-CM: 038.9, 995.91  7/13/2019 - Present Normocytic anemia ICD-10-CM: D64.9 ICD-9-CM: 285.9  7/13/2019 - Present PAD (peripheral artery disease) (HCC) ICD-10-CM: I73.9 ICD-9-CM: 443.9  7/13/2019 - Present Leg wound, left ICD-10-CM: G24.878U ICD-9-CM: 891.0  7/13/2019 - Present Leg laceration, right, initial encounter ICD-10-CM: O90.178J ICD-9-CM: 894.0  7/13/2019 - Present Cellulitis of right upper arm ICD-10-CM: L03.113 ICD-9-CM: 682.3  5/17/2019 - Present Mild intermittent asthma ICD-10-CM: J45.20 ICD-9-CM: 493.90  5/17/2019 - Present Gangrene of finger of right hand [facility]) ICD-10-CM: F39 
ICD-9-CM: 785.4  5/17/2019 - Present Brachial artery thrombus (HCC) ICD-10-CM: X91.8 ICD-9-CM: 444.21  4/26/2019 - Present Bowel obstruction ([facility] ICD-10-CM: X87.960 ICD-9-CM: 560.9  1/8/2019 - Present Partial small bowel obstruction ([facility] ICD-10-CM: K56.600 ICD-9-CM: 560.9  1/5/2019 - Present Sleep apnea ICD-10-CM: G47.30 ICD-9-CM: 780.57  1/5/2019 - Present Overview Signed 1/5/2019  8:41 PM by Flori Jensen DO  
  wears CPAP Atrial fibrillation [facility]) ICD-10-CM: I48.91 
ICD-9-CM: 427.31  11/16/2018 - Present Dyspnea ICD-10-CM: R06.00 
ICD-9-CM: 786.09  11/13/2018 - Present ARF (acute renal failure) (HCC) ICD-10-CM: N17.9 ICD-9-CM: 584.9  11/13/2018 - Present Obesity (BMI 30-39.9) (Chronic) ICD-10-CM: P99.0 ICD-9-CM: 278.00  11/13/2018 - Present Closed wedge compression fracture of T7 vertebra ([facility] ICD-10-CM: Z93.711U ICD-9-CM: 805.2  11/13/2018 - Present Type 2 diabetes with nephropathy ([facility] ICD-10-CM: E11.21 
ICD-9-CM: 250.40, 583.81  10/1/2018 - Present Chest pain ICD-10-CM: R07.9 ICD-9-CM: 786.50  6/27/2018 - Present Abdominal pain ICD-10-CM: R10.9 ICD-9-CM: 789.00  6/27/2018 - Present Recurrent deep vein thrombosis (DVT) (HCC) ICD-10-CM: I82.409 ICD-9-CM: 453.40  3/30/2018 - Present Chronic anticoagulation (Chronic) ICD-10-CM: Z79.01 
ICD-9-CM: V58.61  3/30/2018 - Present Coronary artery disease involving native coronary artery without angina pectoris (Chronic) ICD-10-CM: I25.10 ICD-9-CM: 414.01  1/22/2016 - Present Essential hypertension (Chronic) ICD-10-CM: I10 
ICD-9-CM: 401.9  1/22/2016 - Present CAD (coronary artery disease) ICD-10-CM: I25.10 ICD-9-CM: 414.00  10/9/2015 - Present Type II diabetes mellitus (HCC) (Chronic) ICD-10-CM: E11.9 ICD-9-CM: 250.00  10/9/2015 - Present NSTEMI (non-ST elevated myocardial infarction) ([facility] ICD-10-CM: I21.4 ICD-9-CM: 410.70  10/9/2015 - Present RESOLVED: HTN (hypertension) ICD-10-CM: I10 
ICD-9-CM: 401.9  10/9/2015 - 1/22/2016 Subjective:  
  69 y.o.  female who is admitted with bilateral leg cellulitis. Ms. Arabella Stewart with past medical history of diabetes mellitus, CAD, GERD, DVT, hypertension, CAD, obesity, HAYES presented to ER complaining of abdominal pain, diarrhea, and poor appetite, which started about 2 weeks ago. The abdominal pain is generalized, sharp mild to moderate intensity associated with watery diarrhea.   Last diarrhea was this morning which was watery. Denies fever. Patient has not been eating much the last 2 weeks. Denies shortness of breath, but has chronic cough. Has been having bilateral leg swelling, redness and pain. (Dr Syl Fernandez 
 
3/24:  Feeling better this AM.  Nausea/vomiting resolved. No loose stools so far this AM.  Lactic acid back in normal range. WBC ok. Cultures neg so far. Tmax 99.4. K+ still low - replacing.  
 
3/25:  Feeling worse this AM.  Nausea has returned. C/O more pain in bilat LEs. Redness and heat in LEs has not improved. May add Vanc - will get ID to see and comment. Needs her home CPAP before giving more pain meds. K+ improving.   
 
3/26:  Feeling some better today - less nausea and Percocet has helped the pain. Also only one loose stool overnight per nurses. She has her home CPAP. ID has seen and antibiotics changed to [address]. Afeb. WBC ok. Ortho consulted also and has seen. CT of RLE without abscess. Checking uric acid. 3/27:  Still c/o severe pain but now more localized to right ankle. Less show less discoloration and are not as warm. CRP and Uric acid up a bit - will try empiric colchicine - possible side effects discussed. BP a bit lower - monitoring. Only two loose stools overnight.   
 
3/28:  Pain in legs/right ankle \"almost all gone\" with addition of colchicine and restart of steroids - may have had an acute gout episode. Will give another dose of Colchicine today. A little confused overnight per nurses. Only 4 loose stool over last 24 hrs. Tolerating diet. Na+ lower and Creat higher - will consult Nephrology. Still has not been up much.   
 
3/29:  More pain in right ankle this AM.  Nurses report confusion at night off and on. Not wearing CPAP much - discussed need to wear at night. Not using incentive spirom - discussed need to use every hour while awake. Na+ a little better. Creat a little higher. Uric acid 8.6.   Advised need to start getting more mobile. Advised she will likely need rehab at discharge - at this time she is willing to go.   
 
3/30:  Ankle and lower leg pain has resolved. Uric acid in 9s - definitely gout. She is feeling better. She is now ready for [facility] rehab. Will add Uloric. Case Management for placement. Nephro has seen. Creat is a little better. 3/31:  Reports leg pain has resolved. Likely had cellulitis initially and severe episode of gout. Stable for [facility] rehab. Jeannette Griffith has accepted pt. Review of Systems: A comprehensive review of systems was negative except for that written in the HPI. Objective:  
Physical Exam:  
Visit Vitals /81 Pulse 99 Temp 98.2 °F (36.8 °C) Resp 16 Ht 5' 7\" (1.702 m) Wt 113.4 kg (250 lb) SpO2 99% BMI 39.16 kg/m² O2 Device: Room air Temp (24hrs), Av.9 °F (36.6 °C), Min:97.4 °F (36.3 °C), Max:98.6 °F (37 °C) No intake/output data recorded.  1901 -  0700 In: 27 [P.O.:30] Out: 475 [Urine:475] General:  Alert, obese, cooperative, no distress, appears stated age. Head:  Normocephalic, without obvious abnormality, atraumatic. Eyes:  Conjunctivae/corneas clear. PERRL, EOMs intact. Throat: Lips, mucosa, and tongue moist.. Neck: Supple, symmetrical, trachea midline, no adenopathy, thyroid: no enlargement/tenderness/nodules, no carotid bruit and no JVD. Lungs:   Clear to auscultation bilaterally. Chest wall:  No tenderness or deformity. Heart:  Regular rate and rhythm, S1, S2 normal, no murmur, click, rub or gallop. Abdomen:   Soft, non-tender. Bowel sounds normal. No masses,  No organomegaly. Extremities: no cyanosis. 2+ LE edema. No calf tenderness or cords. Redness and tenderness ant tibial surfaces bilat is much less. Heat over right ankle has resoled. tenderness right ankle has resolved this AM.  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    
Skin:  turgor normal.   
 Neurologic: CNII-XII intact. Alert and oriented X 3. Fine motor of hands and fingers normal.   equal.  No cogwheeling or rigidity. Gait not tested at this time. Sensation grossly normal to touch. Gross motor of extremities normal.    
 
Data Review:  
 CT AB and Pelvis 3/23/20:   INDICATION: Abdominal pain, cough, shortness of breath. Exam: Noncontrast CT of the abdomen and pelvis is performed with 5 mm 
collimation. Sagittal and coronal reformatted images were also performed. CT dose reduction was achieved through the use of a standardized protocol 
tailored for this examination and automatic exposure control for dose 
modulation. Direct comparison is made to prior CT dated January 2019. FINDINGS: 
There is minimal bibasilar linear scarring. There is a small hiatal hernia, 
unchanged. Abdomen:  
Liver: There is diffuse fatty infiltration of the liver. Spleen: The spleen is normal on noncontrast images. Adrenals: The adrenals are normal on noncontrast images. Pancreas: The pancreas is normal on noncontrast images. Gallbladder: The gallbladder is surgically absent. Kidneys: The kidneys are atrophic bilaterally. The right kidney measures 7.4 cm 
in sagittal length. Left kidney measures 8.3 cm in sagittal length. There is no 
hydronephrosis. Bowel: No thickened or dilated loop of large or small bowel seen. Appendix: The appendix is normal. 
Pelvis: Urinary bladder is partially filled and grossly normal. 
Bones: The osseous structures are diffusely demineralized. T7 compression 
fracture and kyphoplasty post procedure changes are noted. Miscellaneous: There is a 5.1 cm x 4.5 cm low-attenuation, thin-walled fluid 
collection anterior to the left femoral vessels, not present on prior CT dated January 2019. There is no free intraperitoneal gas or fluid. There is no focal 
intraperitoneal fluid collection to suggest abscess. There is a small 4.5 cm x 2.8 cm fat-containing periumbilical hernia. The uterus is absent. IMPRESSION:  
1. 5.1 cm x 4.5 cm low-attenuation, thin-walled fluid collection anterior to the 
left femoral vessels, not present on prior CT dated January 2019. Finding likely 
represents a hematoma. Recommend clinical correlation. 2. Small hiatal hernia. 3. Hepatic steatosis. 4. No bowel obstruction, ileus or perforation. No intra-abdominal abscess. CT right LE 3/25/20 EXAM: CT LOW EXT RT W CONT 
 INDICATION: Cellulitis, pain COMPARISON: Duplex ultrasound from the same day CONTRAST: 100 mL of Isovue-300. \ 
 FINDINGS: Bones: Normal bone mineralization. No fracture, dislocation, or 
periosteal reaction. Joint fluid: None. Soft tissue: There is diffuse subcutaneous cutaneous edema without fluid 
collection. There is thickening of the medial and lateral skin along the lower 
leg. No subcutaneous gas. Skin thickening is also noted along the dorsum of the 
proximal foot. Vascular structures are within normal limits. IMPRESSION: No evidence of fracture, joint effusion, or periosteal reaction. Diffuse skin thickening and subcutaneous edema without fluid collection. Recent Days: 
Recent Labs  
  03/31/20 
0400 03/30/20 
0610 03/29/20 
0530 WBC 6.8 9.4 9.4 HGB 11.5 10.9* 11.2* HCT 37.0 35.8 36.4 * 420* 383 Recent Labs  
  03/31/20 
0400 03/30/20 
0610 03/29/20 
0530 * 134* 130*  
K 4.0 4.0 3.9  106 102 CO2 20* 17* 16* * 177* 137* BUN 32* 28* 21* CREA 2.20* 2.47* 2.82* CA 7.8* 7.3* 7.0* ALB 2.0* 1.9* 1.8* TBILI 0.7 0.7 0.8 SGOT 118* 70* 66* ALT 39 20 15 Lab Results Component Value Date/Time Uric acid 9.2 (H) 03/30/2020 06:10 AM  
  
24 Hour Results: 
Recent Results (from the past 24 hour(s)) GLUCOSE, POC Collection Time: 03/30/20  7:28 AM  
Result Value Ref Range Glucose (POC) 170 (H) 65 - 100 mg/dL Performed by Serena Jara (PCT) GLUCOSE, POC  
 Collection Time: 03/30/20 11:38 AM  
Result Value Ref Range Glucose (POC) 193 (H) 65 - 100 mg/dL Performed by Ricci Gaytan (PCT) GLUCOSE, POC Collection Time: 03/30/20  4:38 PM  
Result Value Ref Range Glucose (POC) 210 (H) 65 - 100 mg/dL Performed by Ricci Gaytan (PCT) GLUCOSE, POC Collection Time: 03/30/20  9:27 PM  
Result Value Ref Range Glucose (POC) 209 (H) 65 - 100 mg/dL Performed by Kaylan Torres  (PCT) CBC WITH AUTOMATED DIFF Collection Time: 03/31/20  4:00 AM  
Result Value Ref Range WBC 6.8 3.6 - 11.0 K/uL  
 RBC 3.87 3.80 - 5.20 M/uL  
 HGB 11.5 11.5 - 16.0 g/dL HCT 37.0 35.0 - 47.0 % MCV 95.6 80.0 - 99.0 FL  
 MCH 29.7 26.0 - 34.0 PG  
 MCHC 31.1 30.0 - 36.5 g/dL  
 RDW 17.2 (H) 11.5 - 14.5 % PLATELET 401 (H) 520 - 400 K/uL MPV 9.3 8.9 - 12.9 FL  
 NRBC 0.0 0  WBC ABSOLUTE NRBC 0.00 0.00 - 0.01 K/uL NEUTROPHILS 81 (H) 32 - 75 % BAND NEUTROPHILS 3 % LYMPHOCYTES 7 (L) 12 - 49 % MONOCYTES 5 5 - 13 % EOSINOPHILS 0 0 - 7 % BASOPHILS 0 0 - 1 % METAMYELOCYTES 2 % MYELOCYTES 2 % IMMATURE GRANULOCYTES 0 0.0 - 0.5 % ABS. NEUTROPHILS 5.7 1.8 - 8.0 K/UL  
 ABS. LYMPHOCYTES 0.5 (L) 0.8 - 3.5 K/UL  
 ABS. MONOCYTES 0.3 0.0 - 1.0 K/UL  
 ABS. EOSINOPHILS 0.0 0.0 - 0.4 K/UL  
 ABS. BASOPHILS 0.0 0.0 - 0.1 K/UL  
 ABS. IMM. GRANS. 0.0 0.00 - 0.04 K/UL  
 DF MANUAL    
 RBC COMMENTS ANISOCYTOSIS 
1+ WBC COMMENTS TOXIC GRANULATION    
METABOLIC PANEL, COMPREHENSIVE Collection Time: 03/31/20  4:00 AM  
Result Value Ref Range Sodium 135 (L) 136 - 145 mmol/L Potassium 4.0 3.5 - 5.1 mmol/L Chloride 106 97 - 108 mmol/L  
 CO2 20 (L) 21 - 32 mmol/L Anion gap 9 5 - 15 mmol/L Glucose 214 (H) 65 - 100 mg/dL BUN 32 (H) 6 - 20 MG/DL Creatinine 2.20 (H) 0.55 - 1.02 MG/DL  
 BUN/Creatinine ratio 15 12 - 20 GFR est AA 27 (L) >60 ml/min/1.73m2 GFR est non-AA 22 (L) >60 ml/min/1.73m2 Calcium 7.8 (L) 8.5 - 10.1 MG/DL Bilirubin, total 0.7 0.2 - 1.0 MG/DL  
 ALT (SGPT) 39 12 - 78 U/L  
 AST (SGOT) 118 (H) 15 - 37 U/L Alk. phosphatase 392 (H) 45 - 117 U/L Protein, total 5.8 (L) 6.4 - 8.2 g/dL Albumin 2.0 (L) 3.5 - 5.0 g/dL Globulin 3.8 2.0 - 4.0 g/dL A-G Ratio 0.5 (L) 1.1 - 2.2 Medications reviewed Current Facility-Administered Medications Medication Dose Route Frequency  febuxostat (ULORIC) tablet 40 mg  40 mg Oral DAILY  metoprolol tartrate (LOPRESSOR) tablet 12.5 mg  12.5 mg Oral BID  potassium chloride SR (KLOR-CON 10) tablet 10 mEq  10 mEq Oral BID  
 apixaban (ELIQUIS) tablet 5 mg  5 mg Oral BID  predniSONE (DELTASONE) tablet 20 mg  20 mg Oral BID WITH MEALS  
 oxyCODONE IR (ROXICODONE) tablet 5 mg  5 mg Oral Q4H PRN  prochlorperazine (COMPAZINE) injection 5 mg  5 mg IntraVENous Q6H PRN  
 aspirin delayed-release tablet 81 mg  81 mg Oral DAILY  DULoxetine (CYMBALTA) capsule 60 mg  60 mg Oral DAILY  ferrous sulfate (SLOW FE) ER tablet 142 mg (Patient Supplied)  142 mg Oral ACB  mirtazapine (REMERON) tablet 15 mg  15 mg Oral QHS  pantoprazole (PROTONIX) tablet 40 mg  40 mg Oral ACB  sodium chloride (NS) flush 5-40 mL  5-40 mL IntraVENous Q8H  
 sodium chloride (NS) flush 5-40 mL  5-40 mL IntraVENous PRN  
 insulin lispro (HUMALOG) injection   SubCUTAneous AC&HS  
 glucose chewable tablet 16 g  4 Tab Oral PRN  
 glucagon (GLUCAGEN) injection 1 mg  1 mg IntraMUSCular PRN  
 dextrose 10% infusion 0-250 mL  0-250 mL IntraVENous PRN  
 0.9% sodium chloride with KCl 20 mEq/L infusion   IntraVENous CONTINUOUS  
 acetaminophen (TYLENOL) tablet 325 mg  325 mg Oral Q4H PRN  
 ondansetron (ZOFRAN) injection 4 mg  4 mg IntraVENous Q4H PRN Care Plan discussed with: Patient and Nurse Total time spent with patient: 30 minutes.  
Pati Peres MD

## 2020-03-31 NOTE — PROGRESS NOTES
Harrison Lopez [facility] 81 [address]asmyn Duncan YOB: 1945 Assessment & Plan:  
NAIDA, improved. Due to pre-renal azotemia Probable mild CKD, baseline Cr 1.2 Diarrhea Lactic acidosis Hypokalemia DM Rec: 
OK for d/c from renal standpoint F/u outpt with PCP and get repeat labs in 1-2 wk We can see her in follow up if needed Subjective:  
CC: f/u NAIDA 
HPI: Renal function continues to improve ROS: No sob/n/v Current Facility-Administered Medications Medication Dose Route Frequency  febuxostat (ULORIC) tablet 40 mg  40 mg Oral DAILY  metoprolol tartrate (LOPRESSOR) tablet 12.5 mg  12.5 mg Oral BID  potassium chloride SR (KLOR-CON 10) tablet 10 mEq  10 mEq Oral BID  
 apixaban (ELIQUIS) tablet 5 mg  5 mg Oral BID  predniSONE (DELTASONE) tablet 20 mg  20 mg Oral BID WITH MEALS  
 oxyCODONE IR (ROXICODONE) tablet 5 mg  5 mg Oral Q4H PRN  prochlorperazine (COMPAZINE) injection 5 mg  5 mg IntraVENous Q6H PRN  
 aspirin delayed-release tablet 81 mg  81 mg Oral DAILY  DULoxetine (CYMBALTA) capsule 60 mg  60 mg Oral DAILY  ferrous sulfate (SLOW FE) ER tablet 142 mg (Patient Supplied)  142 mg Oral ACB  mirtazapine (REMERON) tablet 15 mg  15 mg Oral QHS  pantoprazole (PROTONIX) tablet 40 mg  40 mg Oral ACB  sodium chloride (NS) flush 5-40 mL  5-40 mL IntraVENous Q8H  
 sodium chloride (NS) flush 5-40 mL  5-40 mL IntraVENous PRN  
 insulin lispro (HUMALOG) injection   SubCUTAneous AC&HS  
 glucose chewable tablet 16 g  4 Tab Oral PRN  
 glucagon (GLUCAGEN) injection 1 mg  1 mg IntraMUSCular PRN  
 dextrose 10% infusion 0-250 mL  0-250 mL IntraVENous PRN  
 0.9% sodium chloride with KCl 20 mEq/L infusion   IntraVENous CONTINUOUS  
 acetaminophen (TYLENOL) tablet 325 mg  325 mg Oral Q4H PRN  
 ondansetron (ZOFRAN) injection 4 mg  4 mg IntraVENous Q4H PRN Objective:  
 
Vitals: Blood pressure 136/71, pulse 96, temperature 97.7 °F (36.5 °C), resp. rate 18, height 5' 7\" (1.702 m), weight 113.4 kg (250 lb), SpO2 95 %. Temp (24hrs), Av.9 °F (36.6 °C), Min:97.4 °F (36.3 °C), Max:98.6 °F (37 °C) Intake and Output: 
No intake/output data recorded.  1901 -  0700 In: 27 [P.O.:30] Out: 475 [Urine:475] Physical Exam:              
GENERAL ASSESSMENT: NAD 
CHEST: CTA HEART: S1S2 ABDOMEN: Soft,NT 
EXTREMITY: trace EDEMA 
 
   
ECG/rhythm: 
 
Data Review No results for input(s): TNIPOC in the last 72 hours. No lab exists for component: ITNL No results for input(s): CPK, CKMB, TROIQ in the last 72 hours. Recent Labs  
  20 
0400 20 
0610 20 
0530 * 134* 130*  
K 4.0 4.0 3.9  106 102 CO2 20* 17* 16*  
BUN 32* 28* 21* CREA 2.20* 2.47* 2.82* * 177* 137* CA 7.8* 7.3* 7.0* ALB 2.0* 1.9* 1.8* WBC 6.8 9.4 9.4 HGB 11.5 10.9* 11.2* HCT 37.0 35.8 36.4 * 420* 383 No results for input(s): INR, PTP, APTT, INREXT, INREXT in the last 72 hours. Needs: urine analysis, urine sodium, protein and creatinine No results found for: CAITY BERRY 
 
 
 
: Cecelia Cooley MD 
3/31/2020 [facility]: 
[email or web address] [place] office: 
[address] [place] [address], [address] [place], [address] [place] [address] Phone: [phone] Fax :     [phone] [place] office: 
[address] [facility], [address] Phone - [phone] Fax - [phone]

## 2020-04-03 NOTE — CDMP QUERY
Patient was admitted with cellulitis of the bilateral lower extremities covering 
the tibia area. Patient was treated with antibiotics. They are noted to have a 
history of diabetes. After study, can the suspected etiology of the patient's 
cellulitis be further specified as: 
 
=>Cellulitis related to diabetes =>Cellulitis unrelated to diabetes =>Other (please specify) =>Unable to determine Risk Factors: 
Type II DM Clinical Indicators: 
Type II diabetes mellitus ([facility] (10/9/2015).   Continue home medication and cover with sliding scale.  Diabetic diet POC Glucose: 193/210/228/245 Treatment: Glucose monitoring with Correction scale insulin coverage Thank you Agustina Hermosillo, BSN, RN, CCDS 
CDI Supervisor -[facility], [facility] [place]

## 2020-05-29 PROBLEM — L03.90 CELLULITIS: Status: RESOLVED | Noted: 2020-01-01 | Resolved: 2020-01-01

## 2020-05-29 PROBLEM — E87.6 HYPOKALEMIA: Status: RESOLVED | Noted: 2020-01-01 | Resolved: 2020-01-01

## 2020-05-29 PROBLEM — L03.113 CELLULITIS OF RIGHT UPPER ARM: Status: RESOLVED | Noted: 2019-05-17 | Resolved: 2020-01-01

## 2020-05-29 PROBLEM — R55 SYNCOPE AND COLLAPSE: Status: RESOLVED | Noted: 2019-07-13 | Resolved: 2020-01-01

## 2020-05-29 PROBLEM — E87.1 HYPONATREMIA: Status: RESOLVED | Noted: 2020-01-01 | Resolved: 2020-01-01

## 2020-05-29 PROBLEM — R19.7 DIARRHEA: Status: RESOLVED | Noted: 2020-01-01 | Resolved: 2020-01-01

## 2020-05-29 PROBLEM — R07.9 CHEST PAIN: Status: RESOLVED | Noted: 2018-06-27 | Resolved: 2020-01-01

## 2020-05-29 PROBLEM — S81.811A LEG LACERATION, RIGHT, INITIAL ENCOUNTER: Status: RESOLVED | Noted: 2019-07-13 | Resolved: 2020-01-01

## 2020-05-29 PROBLEM — K56.600 PARTIAL SMALL BOWEL OBSTRUCTION (HCC): Status: RESOLVED | Noted: 2019-01-05 | Resolved: 2020-01-01

## 2020-05-29 PROBLEM — R06.00 DYSPNEA: Status: RESOLVED | Noted: 2018-11-13 | Resolved: 2020-01-01

## 2020-05-29 PROBLEM — L03.119 CELLULITIS, LEG: Status: RESOLVED | Noted: 2020-01-01 | Resolved: 2020-01-01

## 2020-05-29 PROBLEM — N17.9 ARF (ACUTE RENAL FAILURE) (HCC): Status: RESOLVED | Noted: 2018-11-13 | Resolved: 2020-01-01

## 2020-05-29 PROBLEM — S22.060A CLOSED WEDGE COMPRESSION FRACTURE OF T7 VERTEBRA (HCC): Status: RESOLVED | Noted: 2018-11-13 | Resolved: 2020-01-01

## 2020-05-29 PROBLEM — R10.9 ABDOMINAL PAIN: Status: RESOLVED | Noted: 2018-06-27 | Resolved: 2020-01-01

## 2020-05-29 PROBLEM — S81.802A LEG WOUND, LEFT: Status: RESOLVED | Noted: 2019-07-13 | Resolved: 2020-01-01

## 2020-05-29 PROBLEM — K56.609 BOWEL OBSTRUCTION (HCC): Status: RESOLVED | Noted: 2019-01-08 | Resolved: 2020-01-01

## 2020-05-29 NOTE — ED TRIAGE NOTES
Patent arrived with her son-in-law Rick Herman [phone]) Patient reports she started with increased shortness of breath this morning- reports she wears 3L nasal cannula at baseline. Patient also reports lower back pain; reports that is new for today- she is requesting to lay down.

## 2020-05-29 NOTE — ED NOTES
Pt sleeping on stretcher in no distress. Call bell within reach. All results available for review. Will continue to monitor.

## 2020-05-29 NOTE — DISCHARGE INSTRUCTIONS
Patient Discharge Instructions    Fabian Webb / [id number] : 1945    Admitted 2020 Discharged: 2020 10:50 AM     ACUTE DIAGNOSES:  Sepsis ([place] [address] [A41.9]    CHRONIC MEDICAL DIAGNOSES:  Problem List as of 2020 Date Reviewed: 2020          Codes Class Noted - Resolved    Cellulitis of lower extremity ICD-10-CM: L03.119  ICD-9-CM: 682.6  3/23/2020 - Present        ASO (arteriosclerosis obliterans) ICD-10-CM: I70.90  ICD-9-CM: 440.9  2019 - Present        PVD (peripheral vascular disease) ([facility] ICD-10-CM: I73.9  ICD-9-CM: 443.9  2019 - Present        Severe obesity ([facility] ICD-10-CM: E66.01  ICD-9-CM: 278.01  2019 - Present        UTI (urinary tract infection) ICD-10-CM: N39.0  ICD-9-CM: 599.0  2019 - Present        COPD (chronic obstructive pulmonary disease) ([place] [address] ICD-10-CM: J44.9  ICD-9-CM: 789  2019 - Present        * (Principal) Sepsis ([place] [address] ICD-10-CM: A41.9  ICD-9-CM: 038.9, 995.91  2019 - Present        Normocytic anemia ICD-10-CM: D64.9  ICD-9-CM: 285.9  2019 - Present        PAD (peripheral artery disease) (HCC) ICD-10-CM: I73.9  ICD-9-CM: 443.9  2019 - Present        Mild intermittent asthma ICD-10-CM: J45.20  ICD-9-CM: 493.90  2019 - Present        Gangrene of finger of right hand ([facility] ICD-10-CM: I96  ICD-9-CM: 785.4  2019 - Present        Brachial artery thrombus (HCC) ICD-10-CM: I74.2  ICD-9-CM: 444.21  2019 - Present        Sleep apnea ICD-10-CM: G47.30  ICD-9-CM: 780.57  2019 - Present    Overview Signed 2019  8:41 PM by Tana Clayton DO     wears CPAP             Atrial fibrillation ([facility] ICD-10-CM: I48.91  ICD-9-CM: 427.31  2018 - Present        Obesity (BMI 30-39.9) (Chronic) ICD-10-CM: E66.9  ICD-9-CM: 278.00  2018 - Present        Type 2 diabetes with nephropathy ([facility] ICD-10-CM: E11.21  ICD-9-CM: 250.40, 583.81  10/1/2018 - Present        Recurrent deep vein thrombosis (DVT) ([facility] ICD-10-CM: I82.409  ICD-9-CM: 453.40  3/30/2018 - Present        Chronic anticoagulation (Chronic) ICD-10-CM: Z79.01  ICD-9-CM: V58.61  3/30/2018 - Present        Coronary artery disease involving native coronary artery without angina pectoris (Chronic) ICD-10-CM: I25.10  ICD-9-CM: 414.01  1/22/2016 - Present        Essential hypertension (Chronic) ICD-10-CM: I10  ICD-9-CM: 401.9  1/22/2016 - Present        CAD (coronary artery disease) ICD-10-CM: I25.10  ICD-9-CM: 414.00  10/9/2015 - Present        Type II diabetes mellitus (HCC) (Chronic) ICD-10-CM: E11.9  ICD-9-CM: 250.00  10/9/2015 - Present        RESOLVED: Cellulitis ICD-10-CM: L03.90  ICD-9-CM: 682.9  3/23/2020 - 5/29/2020        RESOLVED: Hypokalemia ICD-10-CM: E87.6  ICD-9-CM: 276.8  3/23/2020 - 5/29/2020        RESOLVED: Hyponatremia ICD-10-CM: E87.1  ICD-9-CM: 276.1  3/23/2020 - 5/29/2020        RESOLVED: Diarrhea ICD-10-CM: R19.7  ICD-9-CM: 787.91  3/23/2020 - 5/29/2020        RESOLVED: Syncope and collapse ICD-10-CM: R55  ICD-9-CM: 780.2  7/13/2019 - 5/29/2020        RESOLVED: Leg wound, left ICD-10-CM: F78.970K  ICD-9-CM: 891.0  7/13/2019 - 5/29/2020        RESOLVED: Leg laceration, right, initial encounter ICD-10-CM: H49.055O  ICD-9-CM: 894.0  7/13/2019 - 5/29/2020        RESOLVED: Cellulitis of right upper arm ICD-10-CM: L03.113  ICD-9-CM: 682.3  5/17/2019 - 5/29/2020        RESOLVED: Bowel obstruction ([address] ICD-10-CM: R15.094  ICD-9-CM: 560.9  1/8/2019 - 5/29/2020        RESOLVED: Partial small bowel obstruction ([facility] ICD-10-CM: K56.600  ICD-9-CM: 560.9  1/5/2019 - 5/29/2020        RESOLVED: Dyspnea ICD-10-CM: R06.00  ICD-9-CM: 786.09  11/13/2018 - 5/29/2020        RESOLVED: ARF (acute renal failure) ([facility] ICD-10-CM: N17.9  ICD-9-CM: 584.9  11/13/2018 - 5/29/2020        RESOLVED: Closed wedge compression fracture of T7 vertebra ([facility] ICD-10-CM: F43.089A  ICD-9-CM: 805.2  11/13/2018 - 5/29/2020        RESOLVED: Chest pain ICD-10-CM: R07.9  ICD-9-CM: 786.50 6/27/2018 - 5/29/2020        RESOLVED: Abdominal pain ICD-10-CM: R10.9  ICD-9-CM: 789.00  6/27/2018 - 5/29/2020        RESOLVED: HTN (hypertension) ICD-10-CM: I10  ICD-9-CM: 401.9  10/9/2015 - 1/22/2016        RESOLVED: NSTEMI (non-ST elevated myocardial infarction) [facility]) ICD-10-CM: I21.4  ICD-9-CM: 410.70  10/9/2015 - 5/29/2020              DISCHARGE MEDICATIONS:         · It is important that you take the medication exactly as they are prescribed. · Keep your medication in the bottles provided by the pharmacist and keep a list of the medication names, dosages, and times to be taken in your wallet. · Do not take other medications without consulting your doctor. DIET:  Diabetic Diet    ACTIVITY: Activity as tolerated    ADDITIONAL INFORMATION: If you experience any of the following symptoms then please call your primary care physician or return to the emergency room if you cannot get hold of your doctor: Fever, chills, nausea, vomiting, diarrhea, change in mentation, falling, bleeding, shortness of breath. FOLLOW UP CARE:  Dr. Marv Ness MD  you are to call and set up an appointment to see them with in 1 week. Follow-up with specialists at directed by them      Information obtained by :  I understand that if any problems occur once I am at home I am to contact my physician. I understand and acknowledge receipt of the instructions indicated above.                                                                                                                                            Physician's or R.N.'s Signature                                                                  Date/Time                                                                                                                                              Patient or Representative Signature                                                          Date/Time

## 2020-05-29 NOTE — ED PROVIDER NOTES
Dl Dodd is a 69 y.o. female with past medical history notable for asthma, atrial fibrillation, fibromyalgia, DVT, and NSTEMI presenting with generalized weakness, increased shortness of breath for the past several days. Patient states that he has not had fever, productive cough. She has developed dyspnea at rest however. She has not had change in her weight, increased edema. She has been adherent to her medication regimen, including her anticoagulant. She has had worsening generalized weakness over the past several days, dyspnea on exertion. Past Medical History:  
Diagnosis Date  Asthma  Atrial fibrillation ([address] 11/16/2018  Autoimmune disease ([address]   
 fibromyalgia  CAD (coronary artery disease) 10/9/2015  Closed wedge compression fracture of T7 vertebra ([address] 11/13/2018  Diabetes ([address]  DVT (deep vein thrombosis) in pregnancy  GERD (gastroesophageal reflux disease)  Heart failure ([address]  HTN (hypertension)  NSTEMI (non-ST elevated myocardial infarction) ([address] 10/9/2015  Obesity (BMI 30-39.9) 11/13/2018  Sleep apnea   
 wears CPAP Past Surgical History:  
Procedure Laterality Date  ABDOMEN SURGERY PROC UNLISTED    
 hital hernia repair, gall bladder removed  AMPUTATION TRANSMETACARPAL Right 06/12/2019  
 entire ring finger, 2nd & 3rd fingers (transmetacarpal)  BREAST SURGERY PROCEDURE UNLISTED    
 lumpectomy  CARDIAC SURG PROCEDURE UNLIST  10/9/15  
 2 stents [address]  HX COLOSTOMY    
 and reversal, post episiotomy repair [address]  HX UROLOGICAL  2009  
 bladder sling Family History:  
Problem Relation Age of Onset  Diabetes Mother  Heart Failure Mother  Kidney Disease Mother  Diabetes Father  Heart Disease Father  Elevated Lipids Father  Heart Failure Father  Heart Disease Brother  Cancer Brother   
     kidney  Diabetes Brother  No Known Problems Brother  No Known Problems Brother Social History Socioeconomic History  Marital status:  Spouse name: Not on file  Number of children: Not on file  Years of education: Not on file  Highest education level: Not on file Occupational History  Not on file Social Needs  Financial resource strain: Not on file  Food insecurity Worry: Not on file Inability: Not on file  Transportation needs Medical: Not on file Non-medical: Not on file Tobacco Use  Smoking status: Former Smoker Last attempt to quit: 3/30/2017 Years since quitting: 3.1  Smokeless tobacco: Never Used Substance and Sexual Activity  Alcohol use: No  
  Alcohol/week: 0.0 standard drinks Comment: very very rarely  Drug use: No  
 Sexual activity: Never Lifestyle  Physical activity Days per week: Not on file Minutes per session: Not on file  Stress: Not on file Relationships  Social connections Talks on phone: Not on file Gets together: Not on file Attends Restorationism service: Not on file Active member of club or organization: Not on file Attends meetings of clubs or organizations: Not on file Relationship status: Not on file  Intimate partner violence Fear of current or ex partner: Not on file Emotionally abused: Not on file Physically abused: Not on file Forced sexual activity: Not on file Other Topics Concern  Not on file Social History Narrative  Not on file ALLERGIES: Advair diskus [fluticasone propion-salmeterol]; Aspartame; Breo ellipta [fluticasone furoate-vilanterol]; Bumex [bumetanide]; Ciprofloxacin; Statins-hmg-coa reductase inhibitors; Sulfa (sulfonamide antibiotics); Tetracycline; Torsemide; and Zetia [ezetimibe] Review of Systems Constitutional: Positive for chills and fatigue. Negative for fever. Eyes: Negative for photophobia. Respiratory: Positive for shortness of breath. Negative for cough. Cardiovascular: Positive for leg swelling. Negative for chest pain. Gastrointestinal: Positive for nausea. Negative for abdominal pain and vomiting. Genitourinary: Negative for dysuria. Musculoskeletal: Positive for back pain ( ). Neurological: Negative for light-headedness and headaches. Psychiatric/Behavioral: Negative for confusion. All other systems reviewed and are negative. Vitals:  
 05/29/20 1604 05/29/20 1800 05/29/20 1814 05/29/20 1819 BP:  142/61 Pulse: (!) 114  (!) 109 (!) (P) 121 Resp: 21 Temp:    (P) 98.3 °F (36.8 °C) SpO2:  95% Weight:      
Height:      
      
 
Physical Exam 
Vitals signs reviewed. Constitutional:   
   General: She is not in acute distress. HENT:  
   Head: Normocephalic and atraumatic. Mouth/Throat:  
   Mouth: Mucous membranes are moist.  
   Pharynx: Oropharynx is clear. Cardiovascular:  
   Rate and Rhythm: Normal rate and regular rhythm. Heart sounds: Normal heart sounds. Pulmonary:  
   Effort: Pulmonary effort is normal.  
   Breath sounds: Rhonchi present. Chest:  
   Chest wall: No crepitus. Abdominal:  
   Tenderness: There is no abdominal tenderness. There is no guarding or rebound. Musculoskeletal: Normal range of motion. Skin: 
   General: Skin is warm and dry. Capillary Refill: Capillary refill takes less than 2 seconds. Neurological:  
   General: No focal deficit present. Mental Status: She is alert and oriented to person, place, and time. Psychiatric:     
   Mood and Affect: Mood normal.  
 
  
 
MDM Number of Diagnoses or Management Options Abnormal liver function:  
Shortness of breath:  
Suspected 2019 novel coronavirus infection:  
Urinary tract infection without hematuria, site unspecified:  
Critical Care Total time providing critical care: 30-74 minutes (54) 
     
 
// 
LABS REVIEWED: 
 Recent Results (from the past 12 hour(s)) EKG, 12 LEAD, INITIAL Collection Time: 05/29/20  2:07 PM  
Result Value Ref Range Ventricular Rate 116 BPM  
 Atrial Rate 116 BPM  
 P-R Interval 134 ms QRS Duration 78 ms Q-T Interval 330 ms QTC Calculation (Bezet) 458 ms Calculated P Axis 44 degrees Calculated R Axis -91 degrees Calculated T Axis 16 degrees Diagnosis Sinus tachycardia Right superior axis deviation Right ventricular hypertrophy Cannot rule out Anterior infarct , age undetermined Abnormal ECG When compared with ECG of 23-MAR-2020 09:04, 
T wave inversion now evident in Anterior leads SAMPLES BEING HELD Collection Time: 05/29/20  3:01 PM  
Result Value Ref Range SAMPLES BEING HELD CHELY. PST   
 COMMENT Add-on orders for these samples will be processed based on acceptable specimen integrity and analyte stability, which may vary by analyte. CBC WITH AUTOMATED DIFF Collection Time: 05/29/20  3:01 PM  
Result Value Ref Range WBC 13.6 (H) 3.6 - 11.0 K/uL  
 RBC 3.63 (L) 3.80 - 5.20 M/uL  
 HGB 11.0 (L) 11.5 - 16.0 g/dL HCT 37.1 35.0 - 47.0 % .2 (H) 80.0 - 99.0 FL  
 MCH 30.3 26.0 - 34.0 PG  
 MCHC 29.6 (L) 30.0 - 36.5 g/dL  
 RDW 16.2 (H) 11.5 - 14.5 % PLATELET 042 819 - 544 K/uL MPV 11.4 8.9 - 12.9 FL  
 NRBC 0.0 0  WBC ABSOLUTE NRBC 0.00 0.00 - 0.01 K/uL NEUTROPHILS 83 (H) 32 - 75 % LYMPHOCYTES 5 (L) 12 - 49 % MONOCYTES 7 5 - 13 % EOSINOPHILS 2 0 - 7 % BASOPHILS 1 0 - 1 % IMMATURE GRANULOCYTES 2 (H) 0.0 - 0.5 % ABS. NEUTROPHILS 11.3 (H) 1.8 - 8.0 K/UL  
 ABS. LYMPHOCYTES 0.7 (L) 0.8 - 3.5 K/UL  
 ABS. MONOCYTES 1.0 0.0 - 1.0 K/UL  
 ABS. EOSINOPHILS 0.3 0.0 - 0.4 K/UL  
 ABS. BASOPHILS 0.1 0.0 - 0.1 K/UL  
 ABS. IMM. GRANS. 0.3 (H) 0.00 - 0.04 K/UL  
 DF AUTOMATED MAGNESIUM Collection Time: 05/29/20  3:01 PM  
Result Value Ref Range Magnesium 1.4 (L) 1.6 - 2.4 mg/dL METABOLIC PANEL, COMPREHENSIVE Collection Time: 05/29/20  3:01 PM  
Result Value Ref Range Sodium 138 136 - 145 mmol/L Potassium 4.2 3.5 - 5.1 mmol/L Chloride 110 (H) 97 - 108 mmol/L  
 CO2 21 21 - 32 mmol/L Anion gap 7 5 - 15 mmol/L Glucose 158 (H) 65 - 100 mg/dL BUN 19 6 - 20 MG/DL Creatinine 1.28 (H) 0.55 - 1.02 MG/DL  
 BUN/Creatinine ratio 15 12 - 20 GFR est AA 50 (L) >60 ml/min/1.73m2 GFR est non-AA 41 (L) >60 ml/min/1.73m2 Calcium 7.9 (L) 8.5 - 10.1 MG/DL Bilirubin, total 3.6 (H) 0.2 - 1.0 MG/DL  
 ALT (SGPT) 178 (H) 12 - 78 U/L  
 AST (SGOT) 93 (H) 15 - 37 U/L Alk. phosphatase 295 (H) 45 - 117 U/L Protein, total 5.8 (L) 6.4 - 8.2 g/dL Albumin 2.2 (L) 3.5 - 5.0 g/dL Globulin 3.6 2.0 - 4.0 g/dL A-G Ratio 0.6 (L) 1.1 - 2.2    
TROPONIN I Collection Time: 05/29/20  3:01 PM  
Result Value Ref Range Troponin-I, Qt. <0.05 <0.05 ng/mL NT-PRO BNP Collection Time: 05/29/20  3:01 PM  
Result Value Ref Range NT pro- (H) <125 PG/ML  
LACTIC ACID Collection Time: 05/29/20  3:01 PM  
Result Value Ref Range Lactic acid 1.6 0.4 - 2.0 MMOL/L  
URINALYSIS W/MICROSCOPIC Collection Time: 05/29/20  3:40 PM  
Result Value Ref Range Color DARK YELLOW Appearance CLOUDY (A) CLEAR Specific gravity 1.018 1.003 - 1.030    
 pH (UA) 5.0 5.0 - 8.0 Protein 30 (A) NEG mg/dL Glucose Negative NEG mg/dL Ketone Negative NEG mg/dL Blood Negative NEG Urobilinogen 1.0 0.2 - 1.0 EU/dL Nitrites Positive (A) NEG Leukocyte Esterase MODERATE (A) NEG    
 WBC  0 - 4 /hpf  
 RBC 0-5 0 - 5 /hpf Epithelial cells FEW FEW /lpf Bacteria 4+ (A) NEG /hpf Hyaline cast 5-10 0 - 5 /lpf URINE CULTURE HOLD SAMPLE Collection Time: 05/29/20  3:40 PM  
Result Value Ref Range Urine culture hold Urine on hold in Microbiology dept for 2 days.   If unpreserved urine is submitted, it cannot be used for addtional testing after 24 hours, recollection will be required. BILIRUBIN, CONFIRM Collection Time: 05/29/20  3:40 PM  
Result Value Ref Range Bilirubin UA, confirm Negative NEG    
 
 
VITAL SIGNS: 
Patient Vitals for the past 24 hrs: 
 BP Temp Pulse Resp SpO2 Height Weight 05/29/20 1819  (P) 98.3 °F (36.8 °C) (!) (P) 121      
05/29/20 1814   (!) 109      
05/29/20 1800 142/61    95 %    
05/29/20 1604   (!) 114 21     
05/29/20 1600 142/87        
05/29/20 1515 (!) 147/108  (!) 119 23     
05/29/20 1419 102/50  (!) 116 26 93 %    
05/29/20 1357 (!) 85/40  (!) 115 26 93 %    
05/29/20 1237 91/63 98.2 °F (36.8 °C) (!) 130 24 96 % 5' 7\" (1.702 m) 111.6 kg (246 lb) RADIOLOGY RESULTS: 
Xr Chest [facility] Result Date: 5/29/2020 EXAM: XR CHEST PORT INDICATION: dyspnea, cough COMPARISON: 3/23/2020 FINDINGS: A portable AP radiograph of the chest was obtained at 1422 hours. The patient is on a cardiac monitor. The heart size is normal. The lungs are clear. Patient is status post vertebroplasty in the mid thoracic spine. IMPRESSION: The lungs are clear of an acute process DIAGNOSIS: 
1. Urinary tract infection without hematuria, site unspecified 2. Shortness of breath 3. Suspected 2019 novel coronavirus infection PLAN: 
Hospitalist Perfect Serve for Admission 7:10 PM 
 
ED Room Number: CN37/49 Patient Name and age:  Luigi Garcia 69 y.o.  female Working Diagnosis: 1. Urinary tract infection without hematuria, site unspecified 2. Shortness of breath 3. Suspected 2019 novel coronavirus infection 4. Abnormal liver function COVID-19 Suspicion:  yes Code Status:  Full Code Readmission: no 
Isolation Requirements:  yes Recommended Level of Care:  telemetry Department:[facility] ED - [phone] Other: Patient with history of recurrent UTI, COPD presenting with generalized weakness, shortness of breath also with lower back pain, nausea. Denies any anterior abdominal pain. Has history of cholecystectomy. Vital signs notable for tachycardia however no fever, urine suggestive of infection. Has had Klebsiella sensitive to cephalosporins in the past.  Given systemic signs of illness, post factors, possible coronavirus infection plan to admit for further evaluation, initially IV antibiotics. Doubt cholangitis, abdomen benign, though will order ruq us to interrogate biliary tree  
 
admit Gloria Gonzalez MD 
// 
 
ED Course as of May 29 2348 Fri May 29, 2020  
1937 EK:07 PMSinus tachycardia, ventricular rate 116, right axis deviation, inferior Q waves, low voltage, anterior T wave flattening  
 [NS] ED Course User Index [NS] Dk Gonzalez MD  
 
 
Procedures Edrajinder Leavitt was evaluated in the Emergency Department on 2020 for the symptoms described in the history of present illness. He/she was evaluated in the context of the global COVID-19 pandemic, which necessitated consideration that the patient might be at risk for infection with the SARS-CoV-2 virus that causes COVID-19. Institutional protocols and algorithms that pertain to the evaluation of patients at risk for COVID-19 are in a state of rapid change based on information released by regulatory bodies including the CDC and federal and state organizations. These policies and algorithms were followed during the patient's care in the ED.

## 2020-05-30 NOTE — PROGRESS NOTES
[facility]: MED RECONCILIATION Information obtained from: Patient and previous med rec Allergies: Advair diskus [fluticasone propion-salmeterol]; Aspartame; Breo ellipta [fluticasone furoate-vilanterol]; Bumex [bumetanide]; Ciprofloxacin; Statins-hmg-coa reductase inhibitors; Sulfa (sulfonamide antibiotics); Tetracycline; Torsemide; and Zetia [ezetimibe] Prior to Admission Medications:  
 
Medication Documentation Review Audit Reviewed by GISELE العليD (Pharmacist) on 05/29/20 at 2038 Medication Sig Documenting Provider Last Dose Status Taking?  
acetaminophen (TYLENOL) 325 mg tablet Take 1 Tab by mouth every four (4) hours as needed for Pain. Romina Abdullahi MD  Active Yes  
al[facility]erol [facility]) 90 mcg/actuation inhaler Take 2 Puffs by inhalation every four (4) hours as needed. Provider, Historical  Active Yes  
albuterol (PROVENTIL VENTOLIN) 2.5 mg /3 mL (0.083 %) nebulizer solution 2.5 mg by Nebulization route every six (6) hours as needed for Wheezing or Shortness of Breath. Provider, Historical  Active Yes  
apixaban (ELIQUIS) 5 mg tablet Take 1 Tab by mouth two (2) times a day. Romina Abdullahi MD 5/29/2020 AM Active Yes  
aspirin delayed-release 81 mg tablet Take 81 mg by mouth daily. Provider, Historical 5/28/2020 Active Yes  
azelastine-fluticasone (DYMISTA) 137-50 mcg/spray spry 2 Sprays by Nasal route daily. Provider, Historical 5/29/2020 Active Yes  
biotin 10,000 mcg cap Take 1 Cap by mouth daily. Provider, Historical 5/29/2020 Active Yes  
cholecalciferol, vitamin D3, (Vitamin D3) 50 mcg (2,000 unit) tab Take 2,000 Units by mouth daily. Provider, Historical 5/29/2020 Active Yes DULoxetine (CYMBALTA) 60 mg capsule Take 60 mg by mouth daily. Provider, Historical 5/29/2020 Active Yes  
glipiZIDE SR (GLUCOTROL XL) 10 mg CR tablet Take 10 mg by mouth daily. Provider, Historical 5/29/2020 Active Yes ibandronate (BONIVA) 150 mg tablet Take 150 mg by mouth every thirty (30) days. Provider, Historical 5/1/2020 Active Yes Med Note (Katheryn Ahuja   Sat Jul 13, 2019  6:04 PM)    
lactobacillus rhamnosus gg 10 billion cell (CULTURELLE) 10 billion cell capsule Take 1 Cap by mouth daily. Provider, Historical 5/29/2020 Active Yes  
magnesium oxide (MAG-OX) 400 mg tablet Take 400 mg by mouth nightly. Provider, Historical 5/28/2020 Active Yes  
metoprolol tartrate (LOPRESSOR) 25 mg tablet Take 25 mg by mouth daily as needed (For systolic >347). Provider, Historical  Active Yes  
mirtazapine (REMERON) 15 mg tablet Take 15 mg by mouth nightly. Provider, Historical 5/28/2020 Active Yes  
mometasone-formoterol (DULERA) 200-5 mcg/actuation HFA inhaler Take 2 Puffs by inhalation two (2) times a day. Provider, Historical 5/29/2020 AM Active Yes Omeprazole delayed release (PRILOSEC D/R) 20 mg tablet Take 20 mg by mouth two (2) times a day. Provider, Historical 5/29/2020 AM Active Yes  
potassium chloride SR (KLOR-CON 10) 10 mEq tablet Take 10 mEq by mouth two (2) times a day. Provider, Historical 5/29/2020 AM Active Yes  
predniSONE (DELTASONE) 10 mg tablet Take 10 mg by mouth two (2) times daily (with meals). Provider, Historical 5/29/2020 AM Active Yes  
promethazine (PHENERGAN) 25 mg tablet Take 25 mg by mouth every eight (8) hours as needed for Nausea (Nausea not relieved by ondansetron). Provider, Historical  Active Yes  
tiotropium (SPIRIVA WITH HANDIHALER) 18 mcg inhalation capsule Take 1 Cap by inhalation daily. Provider, Historical 5/29/2020 Active Yes [facility]   Contact: [phone]

## 2020-05-30 NOTE — PROGRESS NOTES
Occupational therapy note: 
Orders received, chart reviewed. Patient currently declining activity secondary to increased lethargy. Patient falling asleep during conversation with therapist regarding activity. Will follow up with patient at later time for OT evaluation. Spoke with RN who is aware of patient status. Candis Bautista MS OTR/L

## 2020-05-30 NOTE — PROGRESS NOTES
0781 
Report received 7080 Pt assessed no c/o pain [id number] [facility] No changes noted [address] Pt turned and repositioned cleaned up cream applied 725 [facility] Pt given 2 units for elevated blood sugar 550 Bhaskar Hernandez Pt given meds without difficulty 1907 Bedside and Verbal shift change report given to Shandra  (oncoming nurse) by Josselyn Clark (offgoing nurse). Report included the following information SBAR, Kardex, ED Summary, Procedure Summary, Intake/Output, MAR, Recent Results and Cardiac Rhythm SINUS Tach - NSR.

## 2020-05-30 NOTE — PROGRESS NOTES
Daily Progress Note: 5/30/2020 Jos Bain MD 
 
Assessment/Plan:  
Sepsis [facility]) (7/13/2019) - ?2/2 UTI and LLE cellulitis. Previously with E.coli and Kleb UTI's both susceptible to Ceftriaxone  
-f/u blood cultures and urine cultures -IV ceftriaxone for UTI  
-start vanco for LLE cellulitis 
  
Elevated LFT's - ?2/2 sepsis/hypotension. US without acute pathology  
-check acute hepatitis panel  
-pt denies alcohol abuse  
-may need GI eval if no improvement  
  
Cellulitis of lower extremity (3/23/2020) -start IV vanco  
  
UTI (urinary tract infection) (7/13/2019) -on Ceftriaxone as above  
  
CAD (coronary artery disease) (10/9/2015) -on ASA -hold metoprolol due to marginal BP's  
  
Type II diabetes mellitus ([address] (10/9/2015) -ISS  
-BG checks AC TID and qHS  
-glipizide due to sub par intake  
  
Essential hypertension (1/22/2016) -hold metoprolol due to marginal BP's 
  
Recurrent deep vein thrombosis (DVT) (HCC) (3/30/2018) 
-on Eliquis  
  
Chronic anticoagulation (3/30/2018) 
-on Eliquis  
  
  Obesity (BMI 30-39.9) (11/13/2018) -weight loss  
  
Sleep apnea (1/5/2019- Overview: wears CPAP 
-CPAP  
  
Atrial fibrillation ([facility] (11/16/2018) -on metoprolol and Eliquis; BP's currently marginal so holding metoprolol  
  
COPD (chronic obstructive pulmonary disease) ([facility] (7/13/2019) - NOT exacerbation  
-continue home nebs Problem List: 
Problem List as of 5/30/2020 Date Reviewed: 5/29/2020 Codes Class Noted - Resolved Cellulitis of lower extremity ICD-10-CM: L03.119 ICD-9-CM: 682.6  3/23/2020 - Present ASO (arteriosclerosis obliterans) ICD-10-CM: I70.90 ICD-9-CM: 440.9  9/5/2019 - Present PVD (peripheral vascular disease) ([facility] ICD-10-CM: I73.9 ICD-9-CM: 443.9  8/1/2019 - Present Severe obesity ([facility] ICD-10-CM: E66.01 
ICD-9-CM: 278.01  7/30/2019 - Present UTI (urinary tract infection) ICD-10-CM: N39.0 ICD-9-CM: 599.0  7/13/2019 - Present COPD (chronic obstructive pulmonary disease) (HCC) ICD-10-CM: J44.9 ICD-9-CM: 451  7/13/2019 - Present * (Principal) Sepsis ([facility] ICD-10-CM: A41.9 ICD-9-CM: 038.9, 995.91  7/13/2019 - Present Normocytic anemia ICD-10-CM: D64.9 ICD-9-CM: 285.9  7/13/2019 - Present PAD (peripheral artery disease) ([facility]) ICD-10-CM: I73.9 ICD-9-CM: 443.9  7/13/2019 - Present Mild intermittent asthma ICD-10-CM: J45.20 ICD-9-CM: 493.90  5/17/2019 - Present Gangrene of finger of right hand [facility]) ICD-10-CM: R44 
ICD-9-CM: 785.4  5/17/2019 - Present Brachial artery thrombus (HCC) ICD-10-CM: Q99.0 ICD-9-CM: 444.21  4/26/2019 - Present Sleep apnea ICD-10-CM: G47.30 ICD-9-CM: 780.57  1/5/2019 - Present Overview Signed 1/5/2019  8:41 PM by Wanda Dowd DO  
  wears CPAP Atrial fibrillation [facility]) ICD-10-CM: I48.91 
ICD-9-CM: 427.31  11/16/2018 - Present Obesity (BMI 30-39.9) (Chronic) ICD-10-CM: I50.0 ICD-9-CM: 278.00  11/13/2018 - Present Type 2 diabetes with nephropathy ([facility] ICD-10-CM: E11.21 
ICD-9-CM: 250.40, 583.81  10/1/2018 - Present Recurrent deep vein thrombosis (DVT) (HCC) ICD-10-CM: I82.409 ICD-9-CM: 453.40  3/30/2018 - Present Chronic anticoagulation (Chronic) ICD-10-CM: Z79.01 
ICD-9-CM: V58.61  3/30/2018 - Present Coronary artery disease involving native coronary artery without angina pectoris (Chronic) ICD-10-CM: I25.10 ICD-9-CM: 414.01  1/22/2016 - Present Essential hypertension (Chronic) ICD-10-CM: I10 
ICD-9-CM: 401.9  1/22/2016 - Present CAD (coronary artery disease) ICD-10-CM: I25.10 ICD-9-CM: 414.00  10/9/2015 - Present Type II diabetes mellitus (HCC) (Chronic) ICD-10-CM: E11.9 ICD-9-CM: 250.00  10/9/2015 - Present RESOLVED: Cellulitis ICD-10-CM: L03.90 ICD-9-CM: 682.9  3/23/2020 - 5/29/2020 RESOLVED: Hypokalemia ICD-10-CM: E87.6 ICD-9-CM: 276.8  3/23/2020 - 5/29/2020 RESOLVED: Hyponatremia ICD-10-CM: E87.1 ICD-9-CM: 276.1  3/23/2020 - 5/29/2020 RESOLVED: Diarrhea ICD-10-CM: R19.7 ICD-9-CM: 787.91  3/23/2020 - 5/29/2020 RESOLVED: Syncope and collapse ICD-10-CM: R55 
ICD-9-CM: 780.2  7/13/2019 - 5/29/2020 RESOLVED: Leg wound, left ICD-10-CM: G23.021X ICD-9-CM: 891.0  7/13/2019 - 5/29/2020 RESOLVED: Leg laceration, right, initial encounter ICD-10-CM: Z86.673F ICD-9-CM: 894.0  7/13/2019 - 5/29/2020 RESOLVED: Cellulitis of right upper arm ICD-10-CM: L03.113 ICD-9-CM: 682.3  5/17/2019 - 5/29/2020 RESOLVED: Bowel obstruction ([facility] ICD-10-CM: J09.858 ICD-9-CM: 560.9  1/8/2019 - 5/29/2020 RESOLVED: Partial small bowel obstruction ([facility] ICD-10-CM: K56.600 ICD-9-CM: 560.9  1/5/2019 - 5/29/2020 RESOLVED: Dyspnea ICD-10-CM: R06.00 
ICD-9-CM: 786.09  11/13/2018 - 5/29/2020 RESOLVED: ARF (acute renal failure) (HCC) ICD-10-CM: N17.9 ICD-9-CM: 584.9  11/13/2018 - 5/29/2020 RESOLVED: Closed wedge compression fracture of T7 vertebra ([facility] ICD-10-CM: J21.266P ICD-9-CM: 805.2  11/13/2018 - 5/29/2020 RESOLVED: Chest pain ICD-10-CM: R07.9 ICD-9-CM: 786.50  6/27/2018 - 5/29/2020 RESOLVED: Abdominal pain ICD-10-CM: R10.9 ICD-9-CM: 789.00  6/27/2018 - 5/29/2020 RESOLVED: HTN (hypertension) ICD-10-CM: I10 
ICD-9-CM: 401.9  10/9/2015 - 1/22/2016 RESOLVED: NSTEMI (non-ST elevated myocardial infarction) ([facility] ICD-10-CM: I21.4 ICD-9-CM: 410.70  10/9/2015 - 5/29/2020 HPI:  
Ms. Vinetta Goodpasture is a 69 y.o.  female with PMH of a-fib, asthma, CAD, DM, DVT on Eliquis, HAYES on CPAP admitted for sepsis 2/2 UTI and cellulitis. Per pt, was feeling lethargic and sleeping all day. Nauseated. Noted foul smelling urine prompting her to come to the ED. (Dr María Elena Barbour) 
   
5/30:  Tired this am. Afebrile. COVID pending. Review of Systems: A comprehensive review of systems was negative except for that written in the HPI. Objective:  
Physical Exam:  
 
Visit Vitals /56 (BP 1 Location: Left arm, BP Patient Position: At rest) Pulse (!) 114 Temp 98 °F (36.7 °C) Resp 16 Ht 5' 7\" (1.702 m) Wt 246 lb 3.2 oz (111.7 kg) SpO2 100% Breastfeeding No  
BMI 38.56 kg/m² O2 Flow Rate (L/min): 2 l/min O2 Device: CPAP mask Temp (24hrs), Av.2 °F (36.8 °C), Min:97.8 °F (36.6 °C), Max:98.7 °F (37.1 °C) 
  701 - 1900 In: -  
Out: 250 [Urine:250]   1901 -  07 In: 1000 [I.V.:1000] Out: - General:  Alert, cooperative, no distress, appears stated age. Head:  Normocephalic, without obvious abnormality, atraumatic. Eyes:  Conjunctivae/corneas clear. PERRL, EOMs intact. Nose: Nares normal. Septum midline. Mucosa normal. No drainage or sinus tenderness. Throat: Lips, mucosa, and tongue normal. Teeth and gums normal.  
Neck: Supple, symmetrical, trachea midline, no adenopathy, thyroid: no enlargement/tenderness/nodules, no carotid bruit and no JVD. Back:   Symmetric, no curvature. ROM normal. No CVA tenderness. Lungs:   Clear to auscultation bilaterally. Chest wall:  No tenderness or deformity. Heart:  Tachycardic but regular, S1, S2 normal, no murmur, click, rub or gallop. Abdomen:   Soft, mildly tender. RUQ, Bowel sounds normal. No masses,  No organomegaly. Extremities: Extremities with erythema BLE, warm to touch, no weeping,  No calf tenderness or cords. Pulses: 1+ and symmetric all extremities. Skin: Skin color, texture, turgor normal. No rashes Neurologic: CNII-XII intact. Alert and oriented X 3. Fine motor of hands and fingers normal.   equal.  No cogwheeling or rigidity. Gait not tested at this time. Sensation grossly normal to touch. Gross motor of extremities normal.    
 
Data Review: EXAM: XR CHEST PORT 20 INDICATION: dyspnea, cough COMPARISON: 3/23/2020 FINDINGS: A portable AP radiograph of the chest was obtained at 1422 hours. The 
patient is on a cardiac monitor. The heart size is normal. The lungs are clear. Patient is status post vertebroplasty in the mid thoracic spine. IMPRESSION: 
The lungs are clear of an acute process EXAM: [facility]  5/29/20 INDICATION: Leukocytosis, elevated alkaline phosphatase, AST, SGPT, and serum 
bilirubin. Coronavirus suspected patient. COMPARISON: None TECHNIQUE: Limited abdominal ultrasound. FINDINGS: Limited by obesity and portable technique. Liver: The echogenicity of the liver is increased. This is a nonspecific finding 
but is most commonly seen with fatty infiltration of the liver. There are no 
obvious focal liver lesions. Main portal vein flow: Toward the liver. Fluid: No ascites. Gallbladder: Cholecystectomy. Bile ducts: There is no intra or extrahepatic biliary ductal dilatation. The 
common bile duct measures 6 mm. Pancreas: The visualized portions are within normal limits. Kidneys: Right length: 9.8 cm. No hydronephrosis. Cortices are echogenic. IMPRESSION:  
Limited study. Normal CBD postcholecystectomy given patient's age. Probable 
hepatic steatosis. Nonspecific medical renal disease. Recent Days: 
Recent Labs 05/30/20 
0031 05/29/20 
1501 WBC 11.0 13.6* HGB 11.3* 11.0*  
HCT 36.7 37.1  200 Recent Labs 05/30/20 
0031 05/29/20 
1501  138  
K 3.9 4.2 * 110* CO2 23 21 * 158* BUN 18 19 CREA 1.19* 1.28* CA 7.6* 7.9*  
MG 1.5* 1.4* ALB 2.1* 2.2* TBILI 2.6* 3.6* * 178* No results for input(s): PH, PCO2, PO2, HCO3, FIO2 in the last 72 hours. 24 Hour Results: 
Recent Results (from the past 24 hour(s)) EKG, 12 LEAD, INITIAL Collection Time: 05/29/20  2:07 PM  
Result Value Ref Range Ventricular Rate 116 BPM  
 Atrial Rate 116 BPM  
 P-R Interval 134 ms QRS Duration 78 ms Q-T Interval 330 ms QTC Calculation (Bezet) 458 ms Calculated P Axis 44 degrees Calculated R Axis -91 degrees Calculated T Axis 16 degrees Diagnosis Sinus tachycardia Right superior axis deviation Right ventricular hypertrophy Cannot rule out Anterior infarct , age undetermined Abnormal ECG When compared with ECG of 23-MAR-2020 09:04, 
T wave inversion now evident in Anterior leads SAMPLES BEING HELD Collection Time: 05/29/20  3:01 PM  
Result Value Ref Range SAMPLES BEING HELD CHELY. PST   
 COMMENT Add-on orders for these samples will be processed based on acceptable specimen integrity and analyte stability, which may vary by analyte. CBC WITH AUTOMATED DIFF Collection Time: 05/29/20  3:01 PM  
Result Value Ref Range WBC 13.6 (H) 3.6 - 11.0 K/uL  
 RBC 3.63 (L) 3.80 - 5.20 M/uL  
 HGB 11.0 (L) 11.5 - 16.0 g/dL HCT 37.1 35.0 - 47.0 % .2 (H) 80.0 - 99.0 FL  
 MCH 30.3 26.0 - 34.0 PG  
 MCHC 29.6 (L) 30.0 - 36.5 g/dL  
 RDW 16.2 (H) 11.5 - 14.5 % PLATELET 776 217 - 419 K/uL MPV 11.4 8.9 - 12.9 FL  
 NRBC 0.0 0  WBC ABSOLUTE NRBC 0.00 0.00 - 0.01 K/uL NEUTROPHILS 83 (H) 32 - 75 % LYMPHOCYTES 5 (L) 12 - 49 % MONOCYTES 7 5 - 13 % EOSINOPHILS 2 0 - 7 % BASOPHILS 1 0 - 1 % IMMATURE GRANULOCYTES 2 (H) 0.0 - 0.5 % ABS. NEUTROPHILS 11.3 (H) 1.8 - 8.0 K/UL  
 ABS. LYMPHOCYTES 0.7 (L) 0.8 - 3.5 K/UL  
 ABS. MONOCYTES 1.0 0.0 - 1.0 K/UL  
 ABS. EOSINOPHILS 0.3 0.0 - 0.4 K/UL  
 ABS. BASOPHILS 0.1 0.0 - 0.1 K/UL  
 ABS. IMM. GRANS. 0.3 (H) 0.00 - 0.04 K/UL  
 DF AUTOMATED MAGNESIUM Collection Time: 05/29/20  3:01 PM  
Result Value Ref Range Magnesium 1.4 (L) 1.6 - 2.4 mg/dL METABOLIC PANEL, COMPREHENSIVE Collection Time: 05/29/20  3:01 PM  
Result Value Ref Range Sodium 138 136 - 145 mmol/L Potassium 4.2 3.5 - 5.1 mmol/L Chloride 110 (H) 97 - 108 mmol/L  
 CO2 21 21 - 32 mmol/L  Anion gap 7 5 - 15 mmol/L  
 Glucose 158 (H) 65 - 100 mg/dL BUN 19 6 - 20 MG/DL Creatinine 1.28 (H) 0.55 - 1.02 MG/DL  
 BUN/Creatinine ratio 15 12 - 20 GFR est AA 50 (L) >60 ml/min/1.73m2 GFR est non-AA 41 (L) >60 ml/min/1.73m2 Calcium 7.9 (L) 8.5 - 10.1 MG/DL Bilirubin, total 3.6 (H) 0.2 - 1.0 MG/DL  
 ALT (SGPT) 178 (H) 12 - 78 U/L  
 AST (SGOT) 93 (H) 15 - 37 U/L Alk. phosphatase 295 (H) 45 - 117 U/L Protein, total 5.8 (L) 6.4 - 8.2 g/dL Albumin 2.2 (L) 3.5 - 5.0 g/dL Globulin 3.6 2.0 - 4.0 g/dL A-G Ratio 0.6 (L) 1.1 - 2.2    
TROPONIN I Collection Time: 05/29/20  3:01 PM  
Result Value Ref Range Troponin-I, Qt. <0.05 <0.05 ng/mL NT-PRO BNP Collection Time: 05/29/20  3:01 PM  
Result Value Ref Range NT pro- (H) <125 PG/ML  
LACTIC ACID Collection Time: 05/29/20  3:01 PM  
Result Value Ref Range Lactic acid 1.6 0.4 - 2.0 MMOL/L  
CULTURE, BLOOD, PAIRED Collection Time: 05/29/20  3:01 PM  
Result Value Ref Range Special Requests: NO SPECIAL REQUESTS Culture result: NO GROWTH AFTER 13 HOURS    
URINALYSIS W/MICROSCOPIC Collection Time: 05/29/20  3:40 PM  
Result Value Ref Range Color DARK YELLOW Appearance CLOUDY (A) CLEAR Specific gravity 1.018 1.003 - 1.030    
 pH (UA) 5.0 5.0 - 8.0 Protein 30 (A) NEG mg/dL Glucose Negative NEG mg/dL Ketone Negative NEG mg/dL Blood Negative NEG Urobilinogen 1.0 0.2 - 1.0 EU/dL Nitrites Positive (A) NEG Leukocyte Esterase MODERATE (A) NEG    
 WBC  0 - 4 /hpf  
 RBC 0-5 0 - 5 /hpf Epithelial cells FEW FEW /lpf Bacteria 4+ (A) NEG /hpf Hyaline cast 5-10 0 - 5 /lpf URINE CULTURE HOLD SAMPLE Collection Time: 05/29/20  3:40 PM  
Result Value Ref Range Urine culture hold Urine on hold in Microbiology dept for 2 days. If unpreserved urine is submitted, it cannot be used for addtional testing after 24 hours, recollection will be required. BILIRUBIN, CONFIRM Collection Time: 05/29/20  3:40 PM  
Result Value Ref Range Bilirubin UA, confirm Negative NEG    
SARS-COV-2 Collection Time: 05/29/20  6:58 PM  
Result Value Ref Range Specimen source Nasopharyngeal    
 SARS-CoV-2 PENDING   
 SARS-CoV-2 PENDING Specimen source Nasopharyngeal    
 COVID-19 rapid test PENDING   
 COVID-19 PENDING NEG  
GLUCOSE, POC Collection Time: 05/29/20  9:03 PM  
Result Value Ref Range Glucose (POC) 120 (H) 65 - 100 mg/dL Performed by Deuce Liu METABOLIC PANEL, BASIC Collection Time: 05/30/20 12:31 AM  
Result Value Ref Range Sodium 138 136 - 145 mmol/L Potassium 3.9 3.5 - 5.1 mmol/L Chloride 111 (H) 97 - 108 mmol/L  
 CO2 23 21 - 32 mmol/L Anion gap 4 (L) 5 - 15 mmol/L Glucose 151 (H) 65 - 100 mg/dL BUN 18 6 - 20 MG/DL Creatinine 1.19 (H) 0.55 - 1.02 MG/DL  
 BUN/Creatinine ratio 15 12 - 20 GFR est AA 54 (L) >60 ml/min/1.73m2 GFR est non-AA 45 (L) >60 ml/min/1.73m2 Calcium 7.6 (L) 8.5 - 10.1 MG/DL  
CBC WITH AUTOMATED DIFF Collection Time: 05/30/20 12:31 AM  
Result Value Ref Range WBC 11.0 3.6 - 11.0 K/uL  
 RBC 3.66 (L) 3.80 - 5.20 M/uL  
 HGB 11.3 (L) 11.5 - 16.0 g/dL HCT 36.7 35.0 - 47.0 % .3 (H) 80.0 - 99.0 FL  
 MCH 30.9 26.0 - 34.0 PG  
 MCHC 30.8 30.0 - 36.5 g/dL  
 RDW 17.0 (H) 11.5 - 14.5 % PLATELET 348 575 - 316 K/uL MPV 11.9 8.9 - 12.9 FL  
 NRBC 0.0 0  WBC ABSOLUTE NRBC 0.00 0.00 - 0.01 K/uL NEUTROPHILS 75 32 - 75 % LYMPHOCYTES 9 (L) 12 - 49 % MONOCYTES 10 5 - 13 % EOSINOPHILS 3 0 - 7 % BASOPHILS 1 0 - 1 % IMMATURE GRANULOCYTES 2 (H) 0.0 - 0.5 % ABS. NEUTROPHILS 8.5 (H) 1.8 - 8.0 K/UL  
 ABS. LYMPHOCYTES 1.0 0.8 - 3.5 K/UL  
 ABS. MONOCYTES 1.1 (H) 0.0 - 1.0 K/UL  
 ABS. EOSINOPHILS 0.3 0.0 - 0.4 K/UL  
 ABS. BASOPHILS 0.1 0.0 - 0.1 K/UL  
 ABS. IMM. GRANS. 0.2 (H) 0.00 - 0.04 K/UL  
 DF AUTOMATED MAGNESIUM  Collection Time: 05/30/20 12:31 AM  
 Result Value Ref Range Magnesium 1.5 (L) 1.6 - 2.4 mg/dL HEPATIC FUNCTION PANEL Collection Time: 05/30/20 12:31 AM  
Result Value Ref Range Protein, total 5.4 (L) 6.4 - 8.2 g/dL Albumin 2.1 (L) 3.5 - 5.0 g/dL Globulin 3.3 2.0 - 4.0 g/dL A-G Ratio 0.6 (L) 1.1 - 2.2 Bilirubin, total 2.6 (H) 0.2 - 1.0 MG/DL Bilirubin, direct 1.9 (H) 0.0 - 0.2 MG/DL Alk. phosphatase 290 (H) 45 - 117 U/L  
 AST (SGOT) 70 (H) 15 - 37 U/L  
 ALT (SGPT) 162 (H) 12 - 78 U/L Medications reviewed Current Facility-Administered Medications Medication Dose Route Frequency  sodium chloride (NS) flush 5-40 mL  5-40 mL IntraVENous Q8H  
 sodium chloride (NS) flush 5-40 mL  5-40 mL IntraVENous PRN  
 acetaminophen (TYLENOL) tablet 650 mg  650 mg Oral Q4H PRN  
 HYDROcodone-acetaminophen (NORCO) 5-325 mg per tablet 1 Tab  1 Tab Oral Q4H PRN  
 morphine injection 2 mg  2 mg IntraVENous Q4H PRN  
 naloxone (NARCAN) injection 0.4 mg  0.4 mg IntraVENous PRN  
 ondansetron (ZOFRAN) injection 4 mg  4 mg IntraVENous Q4H PRN  
 0.9% sodium chloride infusion  125 mL/hr IntraVENous CONTINUOUS  
 glucose chewable tablet 16 g  4 Tab Oral PRN  
 dextrose (D50W) injection syrg 12.5-25 g  25-50 mL IntraVENous PRN  
 glucagon (GLUCAGEN) injection 1 mg  1 mg IntraMUSCular PRN  
 insulin lispro (HUMALOG) injection   SubCUTAneous AC&HS  cefTRIAXone (ROCEPHIN) 2 g in 0.9% sodium chloride (MBP/ADV) 50 mL  2 g IntraVENous Q24H  
 vancomycin (VANCOCIN) 1500 mg in  ml infusion  1,500 mg IntraVENous Q24H  
 acetaminophen (TYLENOL) tablet 325 mg  325 mg Oral Q4H PRN  
 albuterol (ACCUNEB) nebulizer solution 1.25 mg  1.25 mg Nebulization Q4H PRN  
 apixaban (ELIQUIS) tablet 5 mg  5 mg Oral BID  aspirin delayed-release tablet 81 mg  81 mg Oral DAILY  cholecalciferol (VITAMIN D3) (1000 Units /25 mcg) tablet 2 Tab  2,000 Units Oral DAILY  DULoxetine (CYMBALTA) capsule 60 mg  60 mg Oral DAILY  lactobac ac& pc-s.therm-b.anim (NUBIA Q/RISAQUAD)  1 Cap Oral DAILY  magnesium oxide (MAG-OX) tablet 400 mg  400 mg Oral QHS  mirtazapine (REMERON) tablet 15 mg  15 mg Oral QHS  pantoprazole (PROTONIX) tablet 40 mg  40 mg Oral ACB&D  potassium chloride SR (KLOR-CON 10) tablet 10 mEq  10 mEq Oral BID  predniSONE (DELTASONE) tablet 10 mg  10 mg Oral BID WITH MEALS  
 budesonide-formoteroL (SYMBICORT) 160-4.5 mcg/actuation HFA inhaler 2 Puff  2 Puff Inhalation BID RT Care Plan discussed with: Patient/Family and Nurse Total time spent with patient and review of records: 30 minutes.  
 
Indu Junior MD

## 2020-05-30 NOTE — PROGRESS NOTES
Physical Therapy Note: 
 
Orders acknowledged, chart reviewed, discussed with RN. PT evaluation deferred. Pt declining participation, reporting she is too fatigued for activity. She arouses to tactile stimulation and repeatedly falls back asleep during brief conversation. RN reports pt lethargic throughout the day. Will continue to follow and proceed with PT evaluation when appropriate.  
 
Adrianna Steven, PT, DPT, Kierra Sadler

## 2020-05-30 NOTE — PROGRESS NOTES
TRANSFER - IN REPORT: 
 
Verbal report received from EiEi(name) on Tanya Mendoza  being received from ER(unit) for routine progression of care Report consisted of patients Situation, Background, Assessment and  
Recommendations(SBAR). Information from the following report(s) SBAR, Kardex, ED Summary, Intake/Output, MAR, Recent Results and Cardiac Rhythm Afib/Sinus tach was reviewed with the receiving nurse. Opportunity for questions and clarification was provided. Assessment completed upon patients arrival to unit and care assumed.

## 2020-05-30 NOTE — ED NOTES
TRANSFER - OUT REPORT: 
 
Verbal report given to MADAI Fischer(name) on Jasmyn Duncan  being transferred to [facility](unit) for routine progression of care Report consisted of patients Situation, Background, Assessment and  
Recommendations(SBAR). Information from the following report(s) SBAR, Kardex, ED Summary, MAR, Accordion and Recent Results was reviewed with the receiving nurse. Lines:  
Peripheral IV 05/29/20 Left Antecubital (Active) Opportunity for questions and clarification was provided. Patient transported with: 
 Monitor O2 @ 2 liters Registered Nurse

## 2020-05-30 NOTE — H&P
[address] 19 
[phone] Admission History and Physical 
 
 
NAME:  Jesse Joseph :   1945 MRN:  [id number] PCP:  Becca Sandhu MD  
 
Date/Time:  2020 Subjective: CHIEF COMPLAINT: \"I didn't feel well\" HISTORY OF PRESENT ILLNESS:    
Ms. Mateo Carlton is a 69 y.o.  female with PMH of a-fib, asthma, CAD, DM, DVT on Eliquis, HAYES on CPAP admitted for sepsis 2/2 UTI and cellulitis. Per pt, was feeling lethargic and sleeping all day. Nauseated. Noted foul smelling urine prompting her to come to the ED. Past Medical History:  
Diagnosis Date  Asthma  Atrial fibrillation ([address] 2018  Autoimmune disease ([address]   
 fibromyalgia  CAD (coronary artery disease) 10/9/2015  Closed wedge compression fracture of T7 vertebra ([address] 2018  Diabetes ([address]  DVT (deep vein thrombosis) in pregnancy  GERD (gastroesophageal reflux disease)  Heart failure ([address]  HTN (hypertension)  NSTEMI (non-ST elevated myocardial infarction) ([address] 10/9/2015  Obesity (BMI 30-39.9) 2018  Sleep apnea   
 wears CPAP Past Surgical History:  
Procedure Laterality Date  ABDOMEN SURGERY PROC UNLISTED    
 hital hernia repair, gall bladder removed  AMPUTATION TRANSMETACARPAL Right 2019  
 entire ring finger, 2nd & 3rd fingers (transmetacarpal)  BREAST SURGERY PROCEDURE UNLISTED    
 lumpectomy  CARDIAC SURG PROCEDURE UNLIST  10/9/15  
 2 stents [address] [place]  HX COLOSTOMY    
 and reversal, post episiotomy repair [address]  HX UROLOGICAL  2009  
 bladder sling Social History Tobacco Use  Smoking status: Former Smoker Last attempt to quit: 3/30/2017 Years since quitting: 3.1  Smokeless tobacco: Never Used Substance Use Topics  Alcohol use: No  
  Alcohol/week: 0.0 standard drinks Comment: very very rarely Family History Problem Relation Age of Onset  Diabetes Mother  Heart Failure Mother  Kidney Disease Mother  Diabetes Father  Heart Disease Father  Elevated Lipids Father  Heart Failure Father  Heart Disease Brother  Cancer Brother   
     kidney  Diabetes Brother  No Known Problems Brother  No Known Problems Brother Allergies Allergen Reactions  Advair Diskus [Fluticasone Propion-Salmeterol] Rash  Aspartame Other (comments)  Breo Ellipta [Fluticasone Furoate-Vilanterol] Rash  Bumex [Bumetanide] Myalgia  Ciprofloxacin Rash  Statins-Hmg-Coa Reductase Inhibitors Other (comments) Muscle pain Lipitor/crestor/zocor  Sulfa (Sulfonamide Antibiotics) Rash  Tetracycline Other (comments) musclepain  Torsemide Myalgia  Zetia [Ezetimibe] Myalgia Prior to Admission medications Medication Sig Start Date End Date Taking? Authorizing Provider  
metoprolol tartrate (LOPRESSOR) 25 mg tablet Take 25 mg by mouth daily as needed (For systolic >927). Yes Provider, Historical  
acetaminophen (TYLENOL) 325 mg tablet Take 1 Tab by mouth every four (4) hours as needed for Pain. 3/31/20  Yes Edgar Hebert MD  
aspirin delayed-release 81 mg tablet Take 81 mg by mouth daily. Yes Provider, Historical  
glipiZIDE SR (GLUCOTROL XL) 10 mg CR tablet Take 10 mg by mouth daily. Yes Provider, Historical  
magnesium oxide (MAG-OX) 400 mg tablet Take 400 mg by mouth nightly. Yes Provider, Historical  
potassium chloride SR (KLOR-CON 10) 10 mEq tablet Take 10 mEq by mouth two (2) times a day. Yes Provider, Historical  
predniSONE (DELTASONE) 10 mg tablet Take 10 mg by mouth two (2) times daily (with meals). Yes Provider, Historical  
apixaban (ELIQUIS) 5 mg tablet Take 1 Tab by mouth two (2) times a day.  5/24/19  Yes Edgar Hebert MD  
 Omeprazole delayed release (PRILOSEC D/R) 20 mg tablet Take 20 mg by mouth two (2) times a day. Yes Provider, Historical  
ibandronate (BONIVA) 150 mg tablet Take 150 mg by mouth every thirty (30) days. Yes Provider, Historical  
promethazine (PHENERGAN) 25 mg tablet Take 25 mg by mouth every eight (8) hours as needed for Nausea (Nausea not relieved by ondansetron). Yes Provider, Historical  
mirtazapine (REMERON) 15 mg tablet Take 15 mg by mouth nightly. Yes Provider, Historical  
tiotropium (SPIRIVA WITH HANDIHALER) 18 mcg inhalation capsule Take 1 Cap by inhalation daily. Yes Provider, Historical  
albuterol (PROVENTIL VENTOLIN) 2.5 mg /3 mL (0.083 %) nebulizer solution 2.5 mg by Nebulization route every six (6) hours as needed for Wheezing or Shortness of Breath. Yes Provider, Historical  
albuterol (PROAIR HFA) 90 mcg/actuation inhaler Take 2 Puffs by inhalation every four (4) hours as needed. Yes Provider, Historical  
lactobacillus rhamnosus gg 10 billion cell (CULTURELLE) 10 billion cell capsule Take 1 Cap by mouth daily. Yes Provider, Historical  
biotin 10,000 mcg cap Take 1 Cap by mouth daily. Yes Provider, Historical  
DULoxetine (CYMBALTA) 60 mg capsule Take 60 mg by mouth daily. Yes Provider, Historical  
cholecalciferol, vitamin D3, (Vitamin D3) 50 mcg (2,000 unit) tab Take 2,000 Units by mouth daily. Yes Provider, Historical  
azelastine-fluticasone (DYMISTA) 137-50 mcg/spray spry 2 Sprays by Nasal route daily. Yes Provider, Historical  
mometasone-formoterol (DULERA) 200-5 mcg/actuation HFA inhaler Take 2 Puffs by inhalation two (2) times a day. Yes Provider, Historical  
 
 
 
Review of Systems: 
(bold if positive, if negative) Gen:  Eyes:  ENT:  CVS:  Pulm:  GI:   
:   
MS:  Skin:  Psych:  Endo:   
Hem:  Renal:   
Neuro:    
Malaise, fatigue, lethargy Objective: VITALS:   
Vital signs reviewed; most recent are: 
 
Visit Vitals /59 (BP 1 Location: Right arm, BP Patient Position: At rest) Pulse (!) 116 Temp 98.7 °F (37.1 °C) Resp 21 Ht 5' 7\" (1.702 m) Wt 111.6 kg (246 lb) SpO2 99% BMI 38.53 kg/m² SpO2 Readings from Last 6 Encounters:  
05/29/20 99% 03/31/20 95% 01/31/20 96% 10/31/19 96% 09/09/19 100% 08/30/19 100% O2 Flow Rate (L/min): 3 l/min Intake/Output Summary (Last 24 hours) at 5/29/2020 2042 Last data filed at 5/29/2020 1859 Gross per 24 hour Intake 1000 ml Output  Net 1000 ml Exam:  
 
Physical Exam: 
 
Gen:  Well-developed, well-nourished, in no acute distress HEENT:  Pink conjunctivae, PERRL, hearing intact to voice, moist mucous membranes Neck:  Supple, without masses, thyroid non-tender Resp:  No accessory muscle use, clear breath sounds without wheezes rales or rhonchi 
Card: Tachycardic. No murmurs, normal S1, S2 without thrills, bruits or peripheral edema Abd: Mild RUQ pain. No rebound/guarding Lymph:  No cervical adenopathy Musc:  No cyanosis or clubbing Skin:  No rashes or ulcers, skin turgor is good Neuro:  Cranial nerves 3-12 are grossly intact Psych:  Alert with good insight. Oriented to person, place, and time LLE erythema, warmth and tenderness Labs: 
 
Recent Labs  
  05/29/20 
1501 WBC 13.6* HGB 11.0*  
HCT 37.1  Recent Labs  
  05/29/20 
1501   
K 4.2 * CO2 21 * BUN 19  
CREA 1.28* CA 7.9*  
MG 1.4* ALB 2.2* * No components found for: [place] No results for input(s): PH, PCO2, PO2, HCO3, FIO2 in the last 72 hours. No results for input(s): INR, INREXT in the last 72 hours. CXR =>  
No acute process US =>  
Limited study. Normal CBD postcholecystectomy given patient's age. Probable 
hepatic steatosis. Nonspecific medical renal disease. Assessment/Plan:   
 Sepsis ([facility] (7/13/2019) - ?2/2 UTI and LLE cellulitis.  Previously with E.coli and Kleb UTI's both susceptible to Ceftriaxone  
-f/u blood cultures and urine cultures -IV ceftriaxone for UTI  
-start vanco for LLE cellulitis Elevated LFT's - ?2/2 sepsis/hypotension. US without acute pathology  
-check acute hepatitis panel  
-pt denies alcohol abuse  
-may need GI eval if no improvement Cellulitis of lower extremity (3/23/2020) -start IV vanco  
 
  UTI (urinary tract infection) (7/13/2019) -on Ceftriaxone as above CAD (coronary artery disease) (10/9/2015) -on ASA -hold metoprolol due to marginal BP's Type II diabetes mellitus ([address] (10/9/2015) -ISS  
-BG checks AC TID and qHS  
-glipizide due to sub par intake Essential hypertension (1/22/2016) -hold metoprolol due to marginal BP's Recurrent deep vein thrombosis (DVT) ([address] (3/30/2018) 
-on Eliquis Chronic anticoagulation (3/30/2018) 
-on Eliquis Obesity (BMI 30-39.9) (11/13/2018) -weight loss Sleep apnea (1/5/2019) Overview: wears CPAP 
-CPAP Atrial fibrillation ([address] (11/16/2018) -on metoprolol and Eliquis; BP's currently marginal so holding metoprolol COPD (chronic obstructive pulmonary disease) ([facility] (7/13/2019) - NOT exacerbation  
-continue home nebs Surrogate decision maker: Daughter Total time spent with patient: 70 Minutes Care Plan discussed with: Patient and Nursing Staff Discussed:  Code Status, Care Plan and D/C Planning Prophylaxis:  Eliquis Probable Disposition:  TBD  
        
___________________________________________________ Attending Physician: Jonel Hall MD

## 2020-05-30 NOTE — PROGRESS NOTES
[address] Dosing Services: Antimicrobial Stewardship Progress Note Consult for antibiotic dosing of Vancomycin by Dr. Mariela Angela Pharmacist reviewed antibiotic appropriateness for 69year old , female  for indication of Cellulitis LLE Day of Therapy - 1 Plan: 
Vancomycin:  
LD 2000 mg (ER dose) MD 1500 mg IV q24h for CrCl 51 ml/min- starting early to make up for larger LD Goal trough 10-15 mcg/ml Date Dose & Interval Measured (mcg/mL) Extrapolated (mcg/mL) ? ? ? ?  
? ? ? ?  
? ? ? ? Other Antimicrobial 
(not dosed by pharmacist) Ceftriaxone Cultures Serum Creatinine Lab Results Component Value Date/Time Creatinine 1.28 (H) 05/29/2020 03:01 PM  
 Creatinine (POC) 1.1 08/01/2019 11:41 AM  
   
Creatinine Clearance Estimated Creatinine Clearance: 51.2 mL/min (A) (based on SCr of 1.28 mg/dL (H)). Procalcitonin  No results found for: PCT Temp 98.7 °F (37.1 °C) WBC Lab Results Component Value Date/Time WBC 13.6 (H) 05/29/2020 03:01 PM  
   
H/H Lab Results Component Value Date/Time HGB 11.0 (L) 05/29/2020 03:01 PM  
  
Platelets Lab Results Component Value Date/Time PLATELET 489 83/01/3834 03:01 PM  
 
  
 
 
Pharmacist: Signed [address], [place] Contact information: [phone]

## 2020-05-30 NOTE — PROGRESS NOTES
5:52 PM  Patient's Covid swab negative x 2. Dr. Harl Lesch notified and no plans to reswab. Message left for infection control.

## 2020-05-31 NOTE — PROGRESS NOTES
Reason for Admission:  Increased shortness of breath and lower back pain RUR Score: 28% Plan for utilizing home health:  Is followed by [facility] PCP: First and Last name: Dr. Jenaro Hemphill Name of Practice:  
 Are you a current patient: Yes/No: yes Approximate date of last visit: march 7 Can you participate in a virtual visit with your PCP:yes Transition of Care Plan: 1. CM to follow for progress monitoring and discharge planning needs 2- coordinate resumption of hh through [facility] 3- family to transport home Patient lives independently at [facility]. She was here recently and went to SNF at [facility] April 1. Rehabilitated back to her apartment, on oxygen at a baseline of 3L a minute. Patient is now followed at home by [facility] for nursing and will need to add therapy visits. She feels she is doing better in the last day or so, she said, and has been working with therapy. Care Management Interventions PCP Verified by CM: Yes Last Visit to PCP: 05/07/20 Mode of Transport at Discharge: Other (see comment)(family car) Transition of Care Consult (CM Consult): Home Health [address]: No 
Reason Outside [place]: Patient already serviced by other home care/hospice agency Physical Therapy Consult: Yes Occupational Therapy Consult: Yes Current Support Network: Lives Alone(lives in independent living at [facility]) Confirm Follow Up Transport: Family Discharge Location Discharge Placement: Home with home health

## 2020-05-31 NOTE — PROGRESS NOTES
Problem: Mobility Impaired (Adult and Pediatric) Goal: *Acute Goals and Plan of Care (Insert Text) Description: FUNCTIONAL STATUS PRIOR TO ADMISSION: Patient was modified independent using a rollator walker for functional mobility. She had recently been in rehab after hospitalization in March for gout. Prior to that, she was indep without DME. 
 
HOME SUPPORT PRIOR TO ADMISSION: The patient lived alone with no local assistance prior to rehab. Since she has been home from rehab, a family member is always with her for assistance. Physical Therapy Goals Initiated 5/31/2020 1. Patient will move from supine to sit and sit to supine , scoot up and down and roll side to side in bed with independence within 7 day(s). 2.  Patient will transfer from bed to chair and chair to bed with supervision/set-up using the least restrictive device within 7 day(s). 3.  Patient will perform sit to stand with supervision/set-up within 7 day(s). 4.  Patient will ambulate with supervision/set-up for 150 feet with the least restrictive device within 7 day(s). Outcome: Progressing Towards Goal 
 PHYSICAL THERAPY EVALUATION Patient: Genesis Tao (22 y.o. female) Date: 5/31/2020 Primary Diagnosis: Sepsis ([facility] [A41.9] Precautions:     
 
 
ASSESSMENT Based on the objective data described below, the patient presents with generalized weakness, decreased activity tolerance, gait dysfunction s/p admission for sepsis 2/2 UTI. Patient recently in rehab and returned home, weaker now than her baseline and using O2 at home. Today, overall needs CGA to SBA for mobility today but limited in endurance due to SOB and generalized weakness. Anticipate good progress with therapy and return home with resumption of HHPT services. Current Level of Function Impacting Discharge (mobility/balance): CGA to SBA for transfers and gait x 30' Functional Outcome Measure:   The patient scored 20 on the Tinetti outcome measure which is indicative of moderate fall risk. Other factors to consider for discharge: good family support Patient will benefit from skilled therapy intervention to address the above noted impairments. PLAN : 
Recommendations and Planned Interventions: bed mobility training, transfer training, gait training, therapeutic exercises, neuromuscular re-education, patient and family training/education, and therapeutic activities Frequency/Duration: Patient will be followed by physical therapy:  5 times a week to address goals. Recommendation for discharge: (in order for the patient to meet his/her long term goals) Physical therapy at least 2 days/week in the home This discharge recommendation: 
Has not yet been discussed the attending provider and/or case management IF patient discharges home will need the following DME: patient owns DME required for discharge SUBJECTIVE:  
Patient stated I feel so much better than I did.  OBJECTIVE DATA SUMMARY:  
HISTORY:   
Past Medical History:  
Diagnosis Date Asthma Atrial fibrillation ([facility] 11/16/2018 Autoimmune disease ([facility]   
 fibromyalgia CAD (coronary artery disease) 10/9/2015 Closed wedge compression fracture of T7 vertebra ([facility] 11/13/2018 Diabetes ([facility] DVT (deep vein thrombosis) in pregnancy GERD (gastroesophageal reflux disease) Heart failure ([address] HTN (hypertension) NSTEMI (non-ST elevated myocardial infarction) ([facility] 10/9/2015 Obesity (BMI 30-39.9) 11/13/2018 Sleep apnea   
 wears CPAP Past Surgical History:  
Procedure Laterality Date ABDOMEN SURGERY PROC UNLISTED    
 hital hernia repair, gall bladder removed AMPUTATION TRANSMETACARPAL Right 06/12/2019  
 entire ring finger, 2nd & 3rd fingers (transmetacarpal) BREAST SURGERY PROCEDURE UNLISTED    
 lumpectomy CARDIAC SURG PROCEDURE UNLIST  10/9/15  
 2 stents [facility] HX COLOSTOMY and reversal, post episiotomy repair [facility] UROLOGICAL  2009  
 bladder sling Personal factors and/or comorbidities impacting plan of care:  
 
Home Situation Home Environment: Private residence # Steps to Enter: 0 One/Two Story Residence: One story # of Interior Steps: 12 Height of Each Step (in): 12 inches Interior Rails: Both Lift Chair Available: Yes Living Alone: No 
Support Systems: Family member(s) Patient Expects to be Discharged to[de-identified] Private residence Current DME Used/Available at Home: Ronnald Real, rollator, Walker, rolling, Wheelchair, Shower chair Tub or Shower Type: Shower EXAMINATION/PRESENTATION/DECISION MAKING:  
Critical Behavior: 
Neurologic State: Alert Orientation Level: Oriented X4 Hearing: Auditory Auditory Impairment: None Range Of Motion: 
AROM: Within functional limits Strength:   
Strength: Generally decreased, functional 
  
  
  
  
  
  
Tone & Sensation:  
Tone: Normal 
  
  
  
  
  
  
  
  
   
Coordination: 
Coordination: Within functional limits Vision:  
  
Functional Mobility: 
Bed Mobility: 
  
Supine to Sit: Stand-by assistance Sit to Supine: Minimum assistance;Assist x1 Scooting: Modified independent; Additional time Transfers: 
Sit to Stand: Stand-by assistance Stand to Sit: Stand-by assistance Bed to Chair: Stand-by assistance Balance:  
Sitting: Intact Standing: Intact; With support Ambulation/Gait Training: 
Distance (ft): 30 Feet (ft) Assistive Device: Gait belt;Walker, rolling Ambulation - Level of Assistance: Stand-by assistance Gait Abnormalities: Decreased step clearance Base of Support: Widened Speed/Es: Pace decreased (<100 feet/min) Functional Measure: 
Tinetti test: 
 
Sitting Balance: 1 Arises: 1 Attempts to Rise: 2 Immediate Standing Balance: 1 Standing Balance: 1 Nudged: 1 Eyes Closed: 1 Turn 360 Degrees - Continuous/Discontinuous: 1 Turn 360 Degrees - Steady/Unsteady: 1 Sitting Down: 1 Balance Score: 11 Balance total score Indication of Gait: 1 
R Step Length/Height: 1 L Step Length/Height: 1 
R Foot Clearance: 1 L Foot Clearance: 1 Step Symmetry: 1 Step Continuity: 1 Path: 1 Trunk: 1 Walking Time: 0 Gait Score: 9 Gait total score Total Score: 20/28 Overall total score Tinetti Tool Score Risk of Falls 
<19 = High Fall Risk 19-24 = Moderate Fall Risk 25-28 = Low Fall Risk Tinetti ME. Performance-Oriented Assessment of Mobility Problems in Elderly Patients. [address]; [id number]. (Scoring Description: PT Bulletin Feb. 10, 1993) Older adults: Antonio Meyers et al, 2009; n = [address] [facility] elderly evaluated with ABC, TRENTON, ADL, and IADL) · Mean TRENTON score for males aged 69-68 years = 26.21(3.40) · Mean TRENTON score for females age 69-68 years = 25.16(4.30) · Mean TRENTON score for males over 80 years = 23.29(6.02) · Mean TRENTON score for females over 80 years = 17.20(8.32) Physical Therapy Evaluation Charge Determination History Examination Presentation Decision-Making HIGH Complexity :3+ comorbidities / personal factors will impact the outcome/ POC  MEDIUM Complexity : 3 Standardized tests and measures addressing body structure, function, activity limitation and / or participation in recreation  MEDIUM Complexity : Evolving with changing characteristics  Other outcome measures Tinetti 20/28  MEDIUM Based on the above components, the patient evaluation is determined to be of the following complexity level: MEDIUM Pain Rating: 
None reported Activity Tolerance:  
Fair and desaturates with exertion and requires oxygen Please refer to the flowsheet for vital signs taken during this treatment. After treatment patient left in no apparent distress:  
Supine in bed and Call bell within reach COMMUNICATION/EDUCATION:  
 The patients plan of care was discussed with: Occupational therapist and Registered nurse. Fall prevention education was provided and the patient/caregiver indicated understanding. and Patient/family agree to work toward stated goals and plan of care. Thank you for this referral. 
Rick Mata, PT Time Calculation: 35 mins

## 2020-05-31 NOTE — PROGRESS NOTES
OCCUPATIONAL THERAPY EVALUATION/DISCHARGE Patient: Darrin Leavitt (70 y.o. female) Date: 5/31/2020 Primary Diagnosis: Sepsis ([facility] [A41.9] Precautions:     
 
ASSESSMENT Based on the objective data described below, the patient presents close to baseline with self-care and mobility. Pt lives alone with supportive family to assist her 6 days/wk. Family does all IADLs and will assist with ADLs if needed. Feel pt is safe to return home at current level of function once medically cleared. Will complete order at this time. Current Level of Function (ADLs/self-care): SBA Functional Outcome Measure: The patient scored 70/100 on the Barthel outcome measure. Other factors to consider for discharge: none noted PLAN : 
Recommend with staff: OOB for all meals, ambulate to bathroom with RW 
 
Recommendation for discharge: (in order for the patient to meet his/her long term goals) No skilled occupational therapy/ follow up rehabilitation needs identified at this time. This discharge recommendation: A follow-up discussion with the attending provider and/or case management is planned IF patient discharges home will need the following DME: none SUBJECTIVE:  
Patient stated I wear slip-on shoes.  OBJECTIVE DATA SUMMARY:  
HISTORY:  
Past Medical History:  
Diagnosis Date  Asthma  Atrial fibrillation ([facility] 11/16/2018  Autoimmune disease ([facility]   
 fibromyalgia  CAD (coronary artery disease) 10/9/2015  Closed wedge compression fracture of T7 vertebra ([facility] 11/13/2018  Diabetes ([facility]  DVT (deep vein thrombosis) in pregnancy  GERD (gastroesophageal reflux disease)  Heart failure ([facility]  HTN (hypertension)  NSTEMI (non-ST elevated myocardial infarction) ([facility] 10/9/2015  Obesity (BMI 30-39.9) 11/13/2018  Sleep apnea   
 wears CPAP Past Surgical History:  
Procedure Laterality Date [address] UNLISTED    
 hital hernia repair, gall bladder removed  AMPUTATION TRANSMETACARPAL Right 06/12/2019  
 entire ring finger, 2nd & 3rd fingers (transmetacarpal)  BREAST SURGERY PROCEDURE UNLISTED    
 lumpectomy  CARDIAC SURG PROCEDURE UNLIST  10/9/15  
 2 stents [place]  HX COLOSTOMY    
 and reversal, post episiotomy repair [address]  HX UROLOGICAL  2009  
 bladder sling Prior Level of Function/Environment/Context:  
Expanded or extensive additional review of patient history:  
Home Situation Home Environment: Private residence # Steps to Enter: 0 One/Two Story Residence: One story # of Interior Steps: 12 Height of Each Step (in): 12 inches Interior Rails: Both Lift Chair Available: Yes Living Alone: No 
Support Systems: Family member(s) Patient Expects to be Discharged to[de-identified] Private residence Current DME Used/Available at Home: [employer], rollator, Walker, rolling, Wheelchair, Shower chair Tub or Shower Type: Shower EXAMINATION OF PERFORMANCE DEFICITS: 
Cognitive/Behavioral Status: 
Neurologic State: Alert Orientation Level: Oriented X4 Skin: impaired Edema: B LEs Hearing: Auditory Auditory Impairment: None Vision/Perceptual:   
    
    
    
  
    
    
  
 
Range of Motion: 
 
AROM: Within functional limits Strength: 
 
Strength: Generally decreased, functional 
  
  
  
  
 
Coordination: 
Coordination: Within functional limits Tone & Sensation: 
 
Tone: Normal 
  
  
  
  
  
  
  
 
Balance: 
Sitting: Intact Standing: Intact; With support Functional Mobility and Transfers for ADLs: 
Bed Mobility: 
Supine to Sit: Stand-by assistance Sit to Supine: Minimum assistance;Assist x1 Scooting: Modified independent; Additional time Transfers: 
Sit to Stand: Stand-by assistance Stand to Sit: Stand-by assistance Bed to Chair: Stand-by assistance Bathroom Mobility: Stand-by assistance Toilet Transfer : Stand-by assistance ADL Assessment: 
Feeding: Independent Oral Facial Hygiene/Grooming: Independent Bathing: Minimum assistance Upper Body Dressing: Setup Lower Body Dressing: Setup; Other (comment)(wears slip-on shoes) Toileting: Stand by assistance ADL Intervention and task modifications: 
  
 
 
Functional Measure: 
Barthel Index: 
 
Bathin Bladder: 10 Bowels: 10 
Groomin Dressin Feeding: 10 Mobility: 10 Stairs: 0 Toilet Use: 10 Transfer (Bed to Chair and Back): 10 Total: 70/100 The Barthel ADL Index: Guidelines 1. The index should be used as a record of what a patient does, not as a record of what a patient could do. 2. The main aim is to establish degree of independence from any help, physical or verbal, however minor and for whatever reason. 3. The need for supervision renders the patient not independent. 4. A patient's performance should be established using the best available evidence. Asking the patient, friends/relatives and nurses are the usual sources, but direct observation and common sense are also important. However direct testing is not needed. 5. Usually the patient's performance over the preceding 24-48 hours is important, but occasionally longer periods will be relevant. 6. Middle categories imply that the patient supplies over 50 per cent of the effort. 7. Use of aids to be independent is allowed. Tomás Ruiz., Barthel, D.W. (7995). Functional evaluation: the Barthel Index. [facility] (14)2. Lelo Buck belen DAMI Riley, Tory Ramirez., Max Samueles.[address] (). Measuring the change indisability after inpatient rehabilitation; comparison of the responsiveness of the Barthel Index and Functional [place] Measure. Journal of Neurology, Neurosurgery, and Psychiatry, 66(4), 546-379.  
Michael Keys, N.J.A, SANIYA Parry, Aspen Patel, M.A. (2004.) Assessment of post-stroke quality of life in cost-effectiveness studies: The usefulness of the Barthel Index and the EuroQoL-5D. [facility], 13, [phone] Occupational Therapy Evaluation Charge Determination History Examination Decision-Making LOW Complexity : Brief history review  LOW Complexity : 1-3 performance deficits relating to physical, cognitive , or psychosocial skils that result in activity limitations and / or participation restrictions  LOW Complexity : No comorbidities that affect functional and no verbal or physical assistance needed to complete eval tasks Based on the above components, the patient evaluation is determined to be of the following complexity level: LOW Pain Rating: 
No c/o of pain Activity Tolerance:  
Fair Please refer to the flowsheet for vital signs taken during this treatment. After treatment patient left in no apparent distress:   
Supine in bed and Call bell within reach COMMUNICATION/EDUCATION:  
The patients plan of care was discussed with: Physical therapist and Registered nurse. Thank you for this referral. 
Jerone Cooks, OTR/L Time Calculation: 35 mins

## 2020-05-31 NOTE — PROGRESS NOTES
[address] Dosing Services: Antimicrobial Stewardship Progress Note Consult for antibiotic dosing of Vancomycin by Dr. Jordan Nguyen Pharmacist reviewed antibiotic appropriateness for 70 yo female for indication of Sepsis/Cellulitis LLE/UTI Day of Therapy: 3 Plan: 
Vancomycin therapy: 
Current maintenance dose: 1500(mg) every 24 hours Dose calculated to approximate a therapeutic trough of ~15 mcg/mL. Last trough level: 18.5 mcg/ml drawn 18.4 hrs post dose, extrapolates to a torugh of ~15.05 mcg/ml which is THERAPEUTIC Plan for level / Adjustment in Therapy: continue same Dose administration notes:   Doses given appropriately as scheduled Other Antimicrobial  
(not dosed by pharmacist) Rocephin 2 gm IV q24hr Cultures 5/30: Nares: SA, MRSA not present at 16hrs pending 5/29: Urine: pending 5/29: Blood: NG x2 days pending Serum Creatinine Lab Results Component Value Date/Time Creatinine 1.19 (H) 05/30/2020 12:31 AM  
 Creatinine (POC) 1.1 08/01/2019 11:41 AM  
  
  
Creatinine Clearance Estimated Creatinine Clearance: 55 mL/min (A) (based on SCr of 1.19 mg/dL (H)). Temp Temp: 97.8 °F (36.6 °C) WBC Lab Results Component Value Date/Time WBC 11.0 05/30/2020 12:31 AM  
 
  
H/H Lab Results Component Value Date/Time HGB 11.3 (L) 05/30/2020 12:31 AM  
 
  
Platelets Lab Results Component Value Date/Time PLATELET 059 16/72/8711 12:31 AM  
 
  
 
Pharmacist Jenifer Cogan Contact information: [phone]

## 2020-05-31 NOTE — PROGRESS NOTES
Daily Progress Note: 5/31/2020 [facility] Gabby Schultz MD 
 
Assessment/Plan:  
Sepsis [facility]) (7/13/2019) - ?2/2 UTI and LLE cellulitis. Previously with E.coli and Kleb UTI's both susceptible to Ceftriaxone  
-blood cultures neg at 2 days,  urine culture pending 
-IV ceftriaxone for UTI  
-start vanco for LLE cellulitis 
  
Elevated LFT's - ?2/2 sepsis/hypotension. US without acute pathology  
-check acute hepatitis panel  
-pt denies alcohol abuse  
-may need GI eval if no improvement  
  
Cellulitis of lower extremity (3/23/2020) -start IV vanco  
  
UTI (urinary tract infection) (7/13/2019) -on Ceftriaxone as above  
  
CAD (coronary artery disease) (10/9/2015) -on ASA -hold metoprolol due to marginal BP's  
  
Type II diabetes mellitus ([address] (10/9/2015) -ISS  
-BG checks AC TID and qHS  
-glipizide due to sub par intake  
  
Essential hypertension (1/22/2016) -hold metoprolol due to marginal BP's 
  
Recurrent deep vein thrombosis (DVT) (HCC) (3/30/2018) 
-on Eliquis  
  
Chronic anticoagulation (3/30/2018) 
-on Eliquis  
  
  Obesity (BMI 30-39.9) (11/13/2018) -weight loss  
  
Sleep apnea (1/5/2019- Overview: wears CPAP 
-CPAP  
  
Atrial fibrillation ([facility] (11/16/2018) -on metoprolol and Eliquis; BP's currently marginal so holding metoprolol  
  
COPD (chronic obstructive pulmonary disease) ([facility] (7/13/2019) - NOT exacerbation  
-continue home nebs Problem List: 
Problem List as of 5/31/2020 Date Reviewed: 5/29/2020 Codes Class Noted - Resolved Cellulitis of lower extremity ICD-10-CM: L03.119 ICD-9-CM: 682.6  3/23/2020 - Present ASO (arteriosclerosis obliterans) ICD-10-CM: I70.90 ICD-9-CM: 440.9  9/5/2019 - Present PVD (peripheral vascular disease) ([facility] ICD-10-CM: I73.9 ICD-9-CM: 443.9  8/1/2019 - Present Severe obesity ([facility] ICD-10-CM: E66.01 
ICD-9-CM: 278.01  7/30/2019 - Present UTI (urinary tract infection) ICD-10-CM: N39.0 ICD-9-CM: 599.0  7/13/2019 - Present COPD (chronic obstructive pulmonary disease) ([facility]) ICD-10-CM: J44.9 ICD-9-CM: 816  7/13/2019 - Present * (Principal) Sepsis ([facility] ICD-10-CM: A41.9 ICD-9-CM: 038.9, 995.91  7/13/2019 - Present Normocytic anemia ICD-10-CM: D64.9 ICD-9-CM: 285.9  7/13/2019 - Present PAD (peripheral artery disease) ([facility]) ICD-10-CM: I73.9 ICD-9-CM: 443.9  7/13/2019 - Present Mild intermittent asthma ICD-10-CM: J45.20 ICD-9-CM: 493.90  5/17/2019 - Present Gangrene of finger of right hand [facility]) ICD-10-CM: A76 
ICD-9-CM: 785.4  5/17/2019 - Present Brachial artery thrombus ([facility]) ICD-10-CM: T54.4 ICD-9-CM: 444.21  4/26/2019 - Present Sleep apnea ICD-10-CM: G47.30 ICD-9-CM: 780.57  1/5/2019 - Present Overview Signed 1/5/2019  8:41 PM by Panda Ulloa DO  
  wears CPAP Atrial fibrillation [facility]) ICD-10-CM: I48.91 
ICD-9-CM: 427.31  11/16/2018 - Present Obesity (BMI 30-39.9) (Chronic) ICD-10-CM: Y84.5 ICD-9-CM: 278.00  11/13/2018 - Present Type 2 diabetes with nephropathy ([facility] ICD-10-CM: E11.21 
ICD-9-CM: 250.40, 583.81  10/1/2018 - Present Recurrent deep vein thrombosis (DVT) (HCC) ICD-10-CM: I82.409 ICD-9-CM: 453.40  3/30/2018 - Present Chronic anticoagulation (Chronic) ICD-10-CM: Z79.01 
ICD-9-CM: V58.61  3/30/2018 - Present Coronary artery disease involving native coronary artery without angina pectoris (Chronic) ICD-10-CM: I25.10 ICD-9-CM: 414.01  1/22/2016 - Present Essential hypertension (Chronic) ICD-10-CM: I10 
ICD-9-CM: 401.9  1/22/2016 - Present CAD (coronary artery disease) ICD-10-CM: I25.10 ICD-9-CM: 414.00  10/9/2015 - Present Type II diabetes mellitus (HCC) (Chronic) ICD-10-CM: E11.9 ICD-9-CM: 250.00  10/9/2015 - Present RESOLVED: Cellulitis ICD-10-CM: L03.90 ICD-9-CM: 682.9  3/23/2020 - 5/29/2020 RESOLVED: Hypokalemia ICD-10-CM: E87.6 ICD-9-CM: 276.8  3/23/2020 - 5/29/2020 RESOLVED: Hyponatremia ICD-10-CM: E87.1 ICD-9-CM: 276.1  3/23/2020 - 5/29/2020 RESOLVED: Diarrhea ICD-10-CM: R19.7 ICD-9-CM: 787.91  3/23/2020 - 5/29/2020 RESOLVED: Syncope and collapse ICD-10-CM: R55 
ICD-9-CM: 780.2  7/13/2019 - 5/29/2020 RESOLVED: Leg wound, left ICD-10-CM: I43.541U ICD-9-CM: 891.0  7/13/2019 - 5/29/2020 RESOLVED: Leg laceration, right, initial encounter ICD-10-CM: U05.442O ICD-9-CM: 894.0  7/13/2019 - 5/29/2020 RESOLVED: Cellulitis of right upper arm ICD-10-CM: L03.113 ICD-9-CM: 682.3  5/17/2019 - 5/29/2020 RESOLVED: Bowel obstruction ([facility] ICD-10-CM: A86.753 ICD-9-CM: 560.9  1/8/2019 - 5/29/2020 RESOLVED: Partial small bowel obstruction ([facility] ICD-10-CM: K56.600 ICD-9-CM: 560.9  1/5/2019 - 5/29/2020 RESOLVED: Dyspnea ICD-10-CM: R06.00 
ICD-9-CM: 786.09  11/13/2018 - 5/29/2020 RESOLVED: ARF (acute renal failure) (HCC) ICD-10-CM: N17.9 ICD-9-CM: 584.9  11/13/2018 - 5/29/2020 RESOLVED: Closed wedge compression fracture of T7 vertebra ([facility] ICD-10-CM: Y09.004H ICD-9-CM: 805.2  11/13/2018 - 5/29/2020 RESOLVED: Chest pain ICD-10-CM: R07.9 ICD-9-CM: 786.50  6/27/2018 - 5/29/2020 RESOLVED: Abdominal pain ICD-10-CM: R10.9 ICD-9-CM: 789.00  6/27/2018 - 5/29/2020 RESOLVED: HTN (hypertension) ICD-10-CM: I10 
ICD-9-CM: 401.9  10/9/2015 - 1/22/2016 RESOLVED: NSTEMI (non-ST elevated myocardial infarction) ([facility] ICD-10-CM: I21.4 ICD-9-CM: 410.70  10/9/2015 - 5/29/2020 HPI:  
Ms. Alec Pratt is a 69 y.o.  female with PMH of a-fib, asthma, CAD, DM, DVT on Eliquis, HAYES on CPAP admitted for sepsis 2/2 UTI and cellulitis. Per pt, was feeling lethargic and sleeping all day. Nauseated. Noted foul smelling urine prompting her to come to the ED. (Dr Efrain Hernandez) 
   
5/30: Tired this am. Afebrile. COVID pending. : Feeling much better. Slept well. BC neg x 2 days. UC pending. Did not participate with PT/OT yesterday due to fatigue. Denies CP, palpitations, or SOB. COVID neg. Review of Systems: A comprehensive review of systems was negative except for that written in the HPI. Objective:  
Physical Exam:  
 
Visit Vitals /55 (BP 1 Location: Left arm, BP Patient Position: At rest) Pulse 99 Temp 97.5 °F (36.4 °C) Resp 16 Ht 5' 7\" (1.702 m) Wt 246 lb 3.2 oz (111.7 kg) SpO2 100% Breastfeeding No  
BMI 38.56 kg/m² O2 Flow Rate (L/min): 2 l/min O2 Device: CPAP mask Temp (24hrs), Av.8 °F (36.6 °C), Min:97.3 °F (36.3 °C), Max:98.4 °F (36.9 °C) No intake/output data recorded.  1901 -  0700 In: 1690.8 [I.V.:1690.8] Out: 250 [Urine:250] General:  Alert, cooperative, no distress, appears stated age. Head:  Normocephalic, without obvious abnormality, atraumatic. Eyes:  Conjunctivae/corneas clear. PERRL, EOMs intact. Nose: Nares normal. Septum midline. Mucosa normal. No drainage or sinus tenderness. Throat: Lips, mucosa, and tongue normal. Teeth and gums normal.  
Neck: Supple, symmetrical, trachea midline, no adenopathy, thyroid: no enlargement/tenderness/nodules, no carotid bruit and no JVD. Back:   Symmetric, no curvature. ROM normal. No CVA tenderness. Lungs:   Clear to auscultation bilaterally. Chest wall:  No tenderness or deformity. Heart:  regular, S1, S2 normal, no murmur, click, rub or gallop. Abdomen:   Soft, mildly tender. RUQ, Bowel sounds normal. No masses,  No organomegaly. Extremities: Extremities with erythema BLE, warm to touch, no weeping,  No calf tenderness or cords. Pulses: 1+ and symmetric all extremities. Skin: Skin color, texture, turgor normal. No rashes Neurologic: CNII-XII intact. Alert and oriented X 3. Fine motor of hands and fingers normal.   equal.  No cogwheeling or rigidity.   Gait not tested at this time. Sensation grossly normal to touch. Gross motor of extremities normal.    
 
Data Review: EXAM: XR CHEST PORT 5/29/20 INDICATION: dyspnea, cough COMPARISON: 3/23/2020 FINDINGS: A portable AP radiograph of the chest was obtained at 1422 hours. The 
patient is on a cardiac monitor. The heart size is normal. The lungs are clear. Patient is status post vertebroplasty in the mid thoracic spine. IMPRESSION: 
The lungs are clear of an acute process EXAM: US ABD LTD  5/29/20 INDICATION: Leukocytosis, elevated alkaline phosphatase, AST, SGPT, and serum 
bilirubin. Coronavirus suspected patient. COMPARISON: None TECHNIQUE: Limited abdominal ultrasound. FINDINGS: Limited by obesity and portable technique. Liver: The echogenicity of the liver is increased. This is a nonspecific finding 
but is most commonly seen with fatty infiltration of the liver. There are no 
obvious focal liver lesions. Main portal vein flow: Toward the liver. Fluid: No ascites. Gallbladder: Cholecystectomy. Bile ducts: There is no intra or extrahepatic biliary ductal dilatation. The 
common bile duct measures 6 mm. Pancreas: The visualized portions are within normal limits. Kidneys: Right length: 9.8 cm. No hydronephrosis. Cortices are echogenic. IMPRESSION:  
Limited study. Normal CBD postcholecystectomy given patient's age. Probable 
hepatic steatosis. Nonspecific medical renal disease. Recent Days: 
Recent Labs 05/31/20 
0520 05/30/20 
0031 05/29/20 
1501 WBC 7.2 11.0 13.6* HGB 10.1* 11.3* 11.0*  
HCT 34.4* 36.7 37.1  247 200 Recent Labs 05/31/20 
0520 05/30/20 
0031 05/29/20 
1501  138 138  
K 4.2 3.9 4.2 * 111* 110* CO2 19* 23 21 * 151* 158* BUN 16 18 19 CREA 1.13* 1.19* 1.28* CA 7.1* 7.6* 7.9*  
MG  --  1.5* 1.4* ALB 1.9* 2.1* 2.2* TBILI 1.3* 2.6* 3.6* * 162* 178* No results for input(s): PH, PCO2, PO2, HCO3, FIO2 in the last 72 hours. 24 Hour Results: 
Recent Results (from the past 24 hour(s)) GLUCOSE, POC Collection Time: 05/30/20  9:32 AM  
Result Value Ref Range Glucose (POC) 186 (H) 65 - 100 mg/dL Performed by [employer] GLUCOSE, POC Collection Time: 05/30/20 11:55 AM  
Result Value Ref Range Glucose (POC) 144 (H) 65 - 100 mg/dL Performed by [employer] GLUCOSE, POC Collection Time: 05/30/20  3:52 PM  
Result Value Ref Range Glucose (POC) 140 (H) 65 - 100 mg/dL Performed by [employer] GLUCOSE, POC Collection Time: 05/30/20  9:28 PM  
Result Value Ref Range Glucose (POC) 123 (H) 65 - 100 mg/dL Performed by [employer] CBC WITH AUTOMATED DIFF Collection Time: 05/31/20  5:20 AM  
Result Value Ref Range WBC 7.2 3.6 - 11.0 K/uL  
 RBC 3.32 (L) 3.80 - 5.20 M/uL  
 HGB 10.1 (L) 11.5 - 16.0 g/dL HCT 34.4 (L) 35.0 - 47.0 % .6 (H) 80.0 - 99.0 FL  
 MCH 30.4 26.0 - 34.0 PG  
 MCHC 29.4 (L) 30.0 - 36.5 g/dL  
 RDW 16.2 (H) 11.5 - 14.5 % PLATELET 908 713 - 352 K/uL MPV 10.9 8.9 - 12.9 FL  
 NRBC 0.0 0  WBC ABSOLUTE NRBC 0.00 0.00 - 0.01 K/uL NEUTROPHILS 85 (H) 32 - 75 % LYMPHOCYTES 7 (L) 12 - 49 % MONOCYTES 7 5 - 13 % EOSINOPHILS 0 0 - 7 % BASOPHILS 0 0 - 1 % IMMATURE GRANULOCYTES 1 (H) 0.0 - 0.5 % ABS. NEUTROPHILS 6.1 1.8 - 8.0 K/UL  
 ABS. LYMPHOCYTES 0.5 (L) 0.8 - 3.5 K/UL  
 ABS. MONOCYTES 0.5 0.0 - 1.0 K/UL  
 ABS. EOSINOPHILS 0.0 0.0 - 0.4 K/UL  
 ABS. BASOPHILS 0.0 0.0 - 0.1 K/UL  
 ABS. IMM. GRANS. 0.1 (H) 0.00 - 0.04 K/UL  
 DF SMEAR SCANNED    
 RBC COMMENTS NORMOCYTIC, NORMOCHROMIC METABOLIC PANEL, COMPREHENSIVE Collection Time: 05/31/20  5:20 AM  
Result Value Ref Range Sodium 138 136 - 145 mmol/L Potassium 4.2 3.5 - 5.1 mmol/L Chloride 113 (H) 97 - 108 mmol/L  
 CO2 19 (L) 21 - 32 mmol/L  Anion gap 6 5 - 15 mmol/L  
 Glucose 171 (H) 65 - 100 mg/dL BUN 16 6 - 20 MG/DL Creatinine 1.13 (H) 0.55 - 1.02 MG/DL  
 BUN/Creatinine ratio 14 12 - 20 GFR est AA 57 (L) >60 ml/min/1.73m2 GFR est non-AA 47 (L) >60 ml/min/1.73m2 Calcium 7.1 (L) 8.5 - 10.1 MG/DL Bilirubin, total 1.3 (H) 0.2 - 1.0 MG/DL  
 ALT (SGPT) 124 (H) 12 - 78 U/L  
 AST (SGOT) 47 (H) 15 - 37 U/L Alk. phosphatase 289 (H) 45 - 117 U/L Protein, total 5.3 (L) 6.4 - 8.2 g/dL Albumin 1.9 (L) 3.5 - 5.0 g/dL Globulin 3.4 2.0 - 4.0 g/dL A-G Ratio 0.6 (L) 1.1 - 2.2 GLUCOSE, POC Collection Time: 05/31/20  8:36 AM  
Result Value Ref Range Glucose (POC) 137 (H) 65 - 100 mg/dL Performed by Maria Elena Dorsey Medications reviewed Current Facility-Administered Medications Medication Dose Route Frequency  budesonide (PULMICORT) 500 mcg/2 ml nebulizer suspension  500 mcg Nebulization BID RT  
 arformoteroL (BROVANA) neb solution 15 mcg  15 mcg Nebulization BID RT  
 Vancomycin RANDOM level @0800 on 5/31/2020   Other ONCE  
 sodium chloride (NS) flush 5-40 mL  5-40 mL IntraVENous Q8H  
 sodium chloride (NS) flush 5-40 mL  5-40 mL IntraVENous PRN  
 acetaminophen (TYLENOL) tablet 650 mg  650 mg Oral Q4H PRN  
 HYDROcodone-acetaminophen (NORCO) 5-325 mg per tablet 1 Tab  1 Tab Oral Q4H PRN  
 morphine injection 2 mg  2 mg IntraVENous Q4H PRN  
 naloxone (NARCAN) injection 0.4 mg  0.4 mg IntraVENous PRN  
 ondansetron (ZOFRAN) injection 4 mg  4 mg IntraVENous Q4H PRN  
 glucose chewable tablet 16 g  4 Tab Oral PRN  
 dextrose (D50W) injection syrg 12.5-25 g  25-50 mL IntraVENous PRN  
 glucagon (GLUCAGEN) injection 1 mg  1 mg IntraMUSCular PRN  
 insulin lispro (HUMALOG) injection   SubCUTAneous AC&HS  cefTRIAXone (ROCEPHIN) 2 g in 0.9% sodium chloride (MBP/ADV) 50 mL  2 g IntraVENous Q24H  
 vancomycin (VANCOCIN) 1500 mg in  ml infusion  1,500 mg IntraVENous Q24H  acetaminophen (TYLENOL) tablet 325 mg  325 mg Oral Q4H PRN  
 albuterol (ACCUNEB) nebulizer solution 1.25 mg  1.25 mg Nebulization Q4H PRN  
 apixaban (ELIQUIS) tablet 5 mg  5 mg Oral BID  aspirin delayed-release tablet 81 mg  81 mg Oral DAILY  cholecalciferol (VITAMIN D3) (1000 Units /25 mcg) tablet 2 Tab  2,000 Units Oral DAILY  DULoxetine (CYMBALTA) capsule 60 mg  60 mg Oral DAILY  lactobac ac& pc-s.therm-b.anim (NUBIA Q/RISAQUAD)  1 Cap Oral DAILY  magnesium oxide (MAG-OX) tablet 400 mg  400 mg Oral QHS  mirtazapine (REMERON) tablet 15 mg  15 mg Oral QHS  pantoprazole (PROTONIX) tablet 40 mg  40 mg Oral ACB&D  potassium chloride SR (KLOR-CON 10) tablet 10 mEq  10 mEq Oral BID  predniSONE (DELTASONE) tablet 10 mg  10 mg Oral BID WITH MEALS Care Plan discussed with: Patient/Family and Nurse Total time spent with patient and review of records: 30 minutes.  
 
Silvina Painting MD

## 2020-05-31 NOTE — PROGRESS NOTES
Bedside and Verbal shift change report given to Ambrocio (oncoming nurse) by Yoshi Fountain (offgoing nurse). Report included the following information SBAR, Kardex, Intake/Output, MAR and Recent Results.

## 2020-05-31 NOTE — PROGRESS NOTES
Bedside shift change report given to Saira (oncoming nurse) by Adiel Carter (offgoing nurse). Report included the following information SBAR, Kardex, Intake/Output and Recent Results. 0830- attempted to get vanc random on pt with no success. Called to ED to see if someone may be able to come up to assist.  Advised will send someone shortly. Bedside shift change report given to Lauren Byrd (oncoming nurse) by Jose Marinelli (offgoing nurse). Report included the following information SBAR, Kardex, Intake/Output and Recent Results.

## 2020-06-01 NOTE — PROGRESS NOTES
Problem: Falls - Risk of 
Goal: *Absence of Falls Description: Document Genevieve Lozano Fall Risk and appropriate interventions in the flowsheet. Outcome: Progressing Towards Goal 
Note: Fall Risk Interventions: 
Mobility Interventions: Assess mobility with egress test, Patient to call before getting OOB, PT Consult for mobility concerns, Utilize walker, cane, or other assistive device Medication Interventions: Patient to call before getting OOB, Teach patient to arise slowly Elimination Interventions: Bed/chair exit alarm, Call light in reach, Patient to call for help with toileting needs, Toileting schedule/hourly rounds Problem: Patient Education: Go to Patient Education Activity Goal: Patient/Family Education Outcome: Progressing Towards Goal 
  
Problem: Pressure Injury - Risk of 
Goal: *Prevention of pressure injury Description: Document Darrion Scale and appropriate interventions in the flowsheet. Outcome: Progressing Towards Goal 
Note: Pressure Injury Interventions: 
Sensory Interventions: Assess need for specialty bed, Discuss PT/OT consult with provider, Turn and reposition approx. every two hours (pillows and wedges if needed) Moisture Interventions: Check for incontinence Q2 hours and as needed, Maintain skin hydration (lotion/cream) Activity Interventions: Increase time out of bed, Pressure redistribution bed/mattress(bed type), PT/OT evaluation Mobility Interventions: Pressure redistribution bed/mattress (bed type), PT/OT evaluation, Turn and reposition approx. every two hours(pillow and wedges) Nutrition Interventions: Document food/fluid/supplement intake, Discuss nutritional consult with provider Friction and Shear Interventions: Lift team/patient mobility team, Transferring/repositioning devices Problem: Patient Education: Go to Patient Education Activity Goal: Patient/Family Education Outcome: Progressing Towards Goal 
  
 Problem: Risk for Spread of Infection Goal: Prevent transmission of infectious organism to others Description: Prevent the transmission of infectious organisms to other patients, staff members, and visitors. Outcome: Progressing Towards Goal 
  
Problem: Patient Education:  Go to Education Activity Goal: Patient/Family Education Outcome: Progressing Towards Goal 
  
Problem: Patient Education: Go to Patient Education Activity Goal: Patient/Family Education Outcome: Progressing Towards Goal

## 2020-06-01 NOTE — PROGRESS NOTES
Daily Progress Note: 6/1/2020 Alvin Courser Serenity Mars MD 
 
Assessment/Plan:  
Sepsis [facility]) (7/13/2019) - ?2/2 UTI. Previously with E.coli and Kleb UTI's both susceptible to Ceftriaxone  
-blood cultures neg at 2 days,  urine culture with gram neg rods 
-IV ceftriaxone for UTI  
-started vanco for LLE cellulitis but legs about as usual so will DC as of 6/1 and watch. 
  
Elevated LFT's - ?2/2 sepsis/hypotension. US without acute pathology  
-check acute hepatitis panel  
-pt denies alcohol abuse  
-improving; also due to fatty liver 
  
Cellulitis of lower extremity (3/23/2020) 
- more due to stasis than infection at this point  
  
UTI (urinary tract infection) (7/13/2019) -on Ceftriaxone as above  
- 100K gram neg rods - awaiting final 
  
CAD (coronary artery disease) (10/9/2015) -on ASA -hold metoprolol due to marginal BP's  
  
Type II diabetes mellitus ([address] (10/9/2015) -ISS   
-glipizide due to sub par intake  
  
Essential hypertension (1/22/2016) -hold metoprolol for now; resume at DC likely 
  
Recurrent deep vein thrombosis (DVT) ([address] (3/30/2018) 
-on Eliquis  
  
Chronic anticoagulation (3/30/2018) 
-on Eliquis  
  
Obesity (BMI 30-39.9) (11/13/2018) -weight loss  
  
Sleep apnea (1/5/2019- Overview: wears CPAP 
-CPAP  
  
Atrial fibrillation ([address] (11/16/2018) -on metoprolol and Eliquis; BP's better and likely resume metoprolol at discharge  
  
COPD (chronic obstructive pulmonary disease) ([address] (7/13/2019) - NOT exacerbation  
-continue home nebs Chronic LE stasis edema and stasis changes 
- monitor Problem List: 
Problem List as of 6/1/2020 Date Reviewed: 5/29/2020 Codes Class Noted - Resolved Cellulitis of lower extremity ICD-10-CM: L03.119 ICD-9-CM: 682.6  3/23/2020 - Present ASO (arteriosclerosis obliterans) ICD-10-CM: I70.90 ICD-9-CM: 440.9  9/5/2019 - Present PVD (peripheral vascular disease) ([facility] ICD-10-CM: I73.9 ICD-9-CM: 443.9  8/1/2019 - Present Severe obesity ([facility] ICD-10-CM: E66.01 
ICD-9-CM: 278.01  7/30/2019 - Present UTI (urinary tract infection) ICD-10-CM: N39.0 ICD-9-CM: 599.0  7/13/2019 - Present COPD (chronic obstructive pulmonary disease) (HCC) ICD-10-CM: J44.9 ICD-9-CM: 960  7/13/2019 - Present * (Principal) Sepsis ([facility] ICD-10-CM: A41.9 ICD-9-CM: 038.9, 995.91  7/13/2019 - Present Normocytic anemia ICD-10-CM: D64.9 ICD-9-CM: 285.9  7/13/2019 - Present PAD (peripheral artery disease) ([facility]) ICD-10-CM: I73.9 ICD-9-CM: 443.9  7/13/2019 - Present Mild intermittent asthma ICD-10-CM: J45.20 ICD-9-CM: 493.90  5/17/2019 - Present Gangrene of finger of right hand [facility]) ICD-10-CM: Y60 
ICD-9-CM: 785.4  5/17/2019 - Present Brachial artery thrombus ([facility]) ICD-10-CM: M93.6 ICD-9-CM: 444.21  4/26/2019 - Present Sleep apnea ICD-10-CM: G47.30 ICD-9-CM: 780.57  1/5/2019 - Present Overview Signed 1/5/2019  8:41 PM by Moises Barrios DO  
  wears CPAP Atrial fibrillation [facility]) ICD-10-CM: I48.91 
ICD-9-CM: 427.31  11/16/2018 - Present Obesity (BMI 30-39.9) (Chronic) ICD-10-CM: G17.1 ICD-9-CM: 278.00  11/13/2018 - Present Type 2 diabetes with nephropathy ([facility] ICD-10-CM: E11.21 
ICD-9-CM: 250.40, 583.81  10/1/2018 - Present Recurrent deep vein thrombosis (DVT) ([facility]) ICD-10-CM: I82.409 ICD-9-CM: 453.40  3/30/2018 - Present Chronic anticoagulation (Chronic) ICD-10-CM: Z79.01 
ICD-9-CM: V58.61  3/30/2018 - Present Coronary artery disease involving native coronary artery without angina pectoris (Chronic) ICD-10-CM: I25.10 ICD-9-CM: 414.01  1/22/2016 - Present Essential hypertension (Chronic) ICD-10-CM: I10 
ICD-9-CM: 401.9  1/22/2016 - Present CAD (coronary artery disease) ICD-10-CM: I25.10 ICD-9-CM: 414.00  10/9/2015 - Present Type II diabetes mellitus (HCC) (Chronic) ICD-10-CM: E11.9 ICD-9-CM: 250.00  10/9/2015 - Present RESOLVED: Cellulitis ICD-10-CM: L03.90 ICD-9-CM: 682.9  3/23/2020 - 5/29/2020 RESOLVED: Hypokalemia ICD-10-CM: E87.6 ICD-9-CM: 276.8  3/23/2020 - 5/29/2020 RESOLVED: Hyponatremia ICD-10-CM: E87.1 ICD-9-CM: 276.1  3/23/2020 - 5/29/2020 RESOLVED: Diarrhea ICD-10-CM: R19.7 ICD-9-CM: 787.91  3/23/2020 - 5/29/2020 RESOLVED: Syncope and collapse ICD-10-CM: R55 
ICD-9-CM: 780.2  7/13/2019 - 5/29/2020 RESOLVED: Leg wound, left ICD-10-CM: F94.962Y ICD-9-CM: 891.0  7/13/2019 - 5/29/2020 RESOLVED: Leg laceration, right, initial encounter ICD-10-CM: H86.794H ICD-9-CM: 894.0  7/13/2019 - 5/29/2020 RESOLVED: Cellulitis of right upper arm ICD-10-CM: L03.113 ICD-9-CM: 682.3  5/17/2019 - 5/29/2020 RESOLVED: Bowel obstruction ([facility] ICD-10-CM: Y05.803 ICD-9-CM: 560.9  1/8/2019 - 5/29/2020 RESOLVED: Partial small bowel obstruction ([facility] ICD-10-CM: K56.600 ICD-9-CM: 560.9  1/5/2019 - 5/29/2020 RESOLVED: Dyspnea ICD-10-CM: R06.00 
ICD-9-CM: 786.09  11/13/2018 - 5/29/2020 RESOLVED: ARF (acute renal failure) (HCC) ICD-10-CM: N17.9 ICD-9-CM: 584.9  11/13/2018 - 5/29/2020 RESOLVED: Closed wedge compression fracture of T7 vertebra ([address] ICD-10-CM: O78.081X ICD-9-CM: 805.2  11/13/2018 - 5/29/2020 RESOLVED: Chest pain ICD-10-CM: R07.9 ICD-9-CM: 786.50  6/27/2018 - 5/29/2020 RESOLVED: Abdominal pain ICD-10-CM: R10.9 ICD-9-CM: 789.00  6/27/2018 - 5/29/2020 RESOLVED: HTN (hypertension) ICD-10-CM: I10 
ICD-9-CM: 401.9  10/9/2015 - 1/22/2016 RESOLVED: NSTEMI (non-ST elevated myocardial infarction) ([facility] ICD-10-CM: I21.4 ICD-9-CM: 410.70  10/9/2015 - 5/29/2020 HPI:  
Ms. Fanny Rivers is a 69 y.o.    female with PMH of a-fib, asthma, CAD, DM, DVT on Eliquis, HAYES on CPAP admitted for sepsis 2/2 UTI and cellulitis. Per pt, was feeling lethargic and sleeping all day. Nauseated. Noted foul smelling urine prompting her to come to the ED. (Dr Kati Carvalho) 
   
: Tired this am. Afebrile. COVID pending.  
 
: Feeling much better. Slept well. BC neg x 2 days. UC pending. Did not participate with PT/OT yesterday due to fatigue. Denies CP, palpitations, or SOB. COVID neg. :  Feeling much better. Slept well and reports not as fatigued. Urine Culture with >100K gram neg rods - awaiting final but clinically much better on Rocephin. Blood cult neg. Legs about as usual so will DC the Vanc and monitor. Review of Systems: A comprehensive review of systems was negative except for that written in the HPI. Objective:  
Physical Exam:  
Visit Vitals /56 (BP 1 Location: Left arm, BP Patient Position: At rest) Pulse 80 Temp 97.3 °F (36.3 °C) Resp 18 Ht 5' 7\" (1.702 m) Wt 111.7 kg (246 lb 3.2 oz) SpO2 98% Breastfeeding No  
BMI 38.56 kg/m² O2 Flow Rate (L/min): 3 l/min O2 Device: CPAP mask Temp (24hrs), Av.8 °F (36.6 °C), Min:97.3 °F (36.3 °C), Max:98.3 °F (36.8 °C) No intake/output data recorded.  1901 -  0700 In: 1740.8 [I.V.:1740.8] Out: 800 [Urine:800] General:  Alert, cooperative, no distress, appears stated age. Head:  Normocephalic, without obvious abnormality, atraumatic. Eyes:  Conjunctivae/corneas clear. EOMs intact. Throat: Lips, mucosa, and tongue normal. Teeth and gums normal.  
Neck: Supple, symmetrical, trachea midline, no adenopathy, thyroid: no enlargement/tenderness/nodules, no carotid bruit and no JVD. Lungs:   Clear to auscultation bilaterally. Chest wall:  No tenderness or deformity. Heart:  regular, S1, S2 normal, no murmur, click, rub or gallop. Abdomen:   Soft, mildly tender. RUQ, Bowel sounds normal. No masses,  No organomegaly.   
Extremities: Extremities with erythema BLE, warm to touch, no weeping - as usual.  No calf tenderness or cords. Pulses: 1+ and symmetric all extremities. Skin: Skin color, texture, turgor normal. No rashes Neurologic:  Alert and oriented X 3. Fine motor of hands and fingers normal.   equal.  No cogwheeling or rigidity. Gait not tested at this time. Sensation grossly normal to touch. Gross motor of extremities normal.    
 
Data Review: EXAM: XR CHEST PORT 5/29/20 INDICATION: dyspnea, cough COMPARISON: 3/23/2020 FINDINGS: A portable AP radiograph of the chest was obtained at 1422 hours. The 
patient is on a cardiac monitor. The heart size is normal. The lungs are clear. Patient is status post vertebroplasty in the mid thoracic spine. IMPRESSION: 
The lungs are clear of an acute process EXAM: US ABD LTD  5/29/20 INDICATION: Leukocytosis, elevated alkaline phosphatase, AST, SGPT, and serum 
bilirubin. Coronavirus suspected patient. COMPARISON: None TECHNIQUE: Limited abdominal ultrasound. FINDINGS: Limited by obesity and portable technique. Liver: The echogenicity of the liver is increased. This is a nonspecific finding 
but is most commonly seen with fatty infiltration of the liver. There are no 
obvious focal liver lesions. Main portal vein flow: Toward the liver. Fluid: No ascites. Gallbladder: Cholecystectomy. Bile ducts: There is no intra or extrahepatic biliary ductal dilatation. The 
common bile duct measures 6 mm. Pancreas: The visualized portions are within normal limits. Kidneys: Right length: 9.8 cm. No hydronephrosis. Cortices are echogenic. IMPRESSION:  
Limited study. Normal CBD postcholecystectomy given patient's age. Probable 
hepatic steatosis. Nonspecific medical renal disease. Recent Days: 
Recent Labs  
  06/01/20 
0030 05/31/20 
0520 05/30/20 
0031 WBC 8.7 7.2 11.0 HGB 9.4* 10.1* 11.3* HCT 31.5* 34.4* 36.7  220 247 Recent Labs  
  06/01/20 
0030 05/31/20 
0520 05/30/20 
0031 05/29/20 
1501  138 138 138  
K 4.1 4.2 3.9 4.2 * 113* 111* 110* CO2 22 19* 23 21 * 171* 151* 158* BUN 18 16 18 19 CREA 1.19* 1.13* 1.19* 1.28* CA 7.1* 7.1* 7.6* 7.9*  
MG  --   --  1.5* 1.4* ALB 2.0* 1.9* 2.1* 2.2* TBILI 1.1* 1.3* 2.6* 3.6* * 124* 162* 178* No results for input(s): PH, PCO2, PO2, HCO3, FIO2 in the last 72 hours. 24 Hour Results: 
Recent Results (from the past 24 hour(s)) GLUCOSE, POC Collection Time: 05/31/20  8:36 AM  
Result Value Ref Range Glucose (POC) 137 (H) 65 - 100 mg/dL Performed by Luf[place] Solitario Aden Semen Collection Time: 05/31/20 10:03 AM  
Result Value Ref Range Vancomycin, random 18.5 UG/ML  
GLUCOSE, POC Collection Time: 05/31/20 11:30 AM  
Result Value Ref Range Glucose (POC) 126 (H) 65 - 100 mg/dL Performed by Nemiah Balhari GLUCOSE, POC Collection Time: 05/31/20  4:06 PM  
Result Value Ref Range Glucose (POC) 136 (H) 65 - 100 mg/dL Performed by Nemiah Balhari GLUCOSE, POC Collection Time: 05/31/20  9:09 PM  
Result Value Ref Range Glucose (POC) 132 (H) 65 - 100 mg/dL Performed by Influitive CBC WITH AUTOMATED DIFF Collection Time: 06/01/20 12:30 AM  
Result Value Ref Range WBC 8.7 3.6 - 11.0 K/uL  
 RBC 3.13 (L) 3.80 - 5.20 M/uL HGB 9.4 (L) 11.5 - 16.0 g/dL HCT 31.5 (L) 35.0 - 47.0 % .6 (H) 80.0 - 99.0 FL  
 MCH 30.0 26.0 - 34.0 PG  
 MCHC 29.8 (L) 30.0 - 36.5 g/dL  
 RDW 16.4 (H) 11.5 - 14.5 % PLATELET 745 646 - 551 K/uL MPV 11.0 8.9 - 12.9 FL  
 NRBC 0.0 0  WBC ABSOLUTE NRBC 0.00 0.00 - 0.01 K/uL NEUTROPHILS 86 (H) 32 - 75 % LYMPHOCYTES 4 (L) 12 - 49 % MONOCYTES 8 5 - 13 % EOSINOPHILS 1 0 - 7 % BASOPHILS 0 0 - 1 % IMMATURE GRANULOCYTES 1 (H) 0.0 - 0.5 % ABS. NEUTROPHILS 7.5 1.8 - 8.0 K/UL  
 ABS. LYMPHOCYTES 0.4 (L) 0.8 - 3.5 K/UL  
 ABS. MONOCYTES 0.7 0.0 - 1.0 K/UL  
 ABS. EOSINOPHILS 0.1 0.0 - 0.4 K/UL ABS. BASOPHILS 0.0 0.0 - 0.1 K/UL  
 ABS. IMM. GRANS. 0.1 (H) 0.00 - 0.04 K/UL  
 DF AUTOMATED METABOLIC PANEL, COMPREHENSIVE Collection Time: 06/01/20 12:30 AM  
Result Value Ref Range Sodium 140 136 - 145 mmol/L Potassium 4.1 3.5 - 5.1 mmol/L Chloride 112 (H) 97 - 108 mmol/L  
 CO2 22 21 - 32 mmol/L Anion gap 6 5 - 15 mmol/L Glucose 180 (H) 65 - 100 mg/dL BUN 18 6 - 20 MG/DL Creatinine 1.19 (H) 0.55 - 1.02 MG/DL  
 BUN/Creatinine ratio 15 12 - 20 GFR est AA 54 (L) >60 ml/min/1.73m2 GFR est non-AA 45 (L) >60 ml/min/1.73m2 Calcium 7.1 (L) 8.5 - 10.1 MG/DL Bilirubin, total 1.1 (H) 0.2 - 1.0 MG/DL  
 ALT (SGPT) 103 (H) 12 - 78 U/L  
 AST (SGOT) 43 (H) 15 - 37 U/L Alk. phosphatase 296 (H) 45 - 117 U/L Protein, total 5.0 (L) 6.4 - 8.2 g/dL Albumin 2.0 (L) 3.5 - 5.0 g/dL Globulin 3.0 2.0 - 4.0 g/dL A-G Ratio 0.7 (L) 1.1 - 2.2 Medications reviewed Current Facility-Administered Medications Medication Dose Route Frequency  budesonide (PULMICORT) 500 mcg/2 ml nebulizer suspension  500 mcg Nebulization BID RT  
 arformoteroL (BROVANA) neb solution 15 mcg  15 mcg Nebulization BID RT  
 sodium chloride (NS) flush 5-40 mL  5-40 mL IntraVENous Q8H  
 sodium chloride (NS) flush 5-40 mL  5-40 mL IntraVENous PRN  
 acetaminophen (TYLENOL) tablet 650 mg  650 mg Oral Q4H PRN  
 HYDROcodone-acetaminophen (NORCO) 5-325 mg per tablet 1 Tab  1 Tab Oral Q4H PRN  
 morphine injection 2 mg  2 mg IntraVENous Q4H PRN  
 naloxone (NARCAN) injection 0.4 mg  0.4 mg IntraVENous PRN  
 ondansetron (ZOFRAN) injection 4 mg  4 mg IntraVENous Q4H PRN  
 glucose chewable tablet 16 g  4 Tab Oral PRN  
 dextrose (D50W) injection syrg 12.5-25 g  25-50 mL IntraVENous PRN  
 glucagon (GLUCAGEN) injection 1 mg  1 mg IntraMUSCular PRN  
 insulin lispro (HUMALOG) injection   SubCUTAneous AC&HS  cefTRIAXone (ROCEPHIN) 2 g in 0.9% sodium chloride (MBP/ADV) 50 mL  2 g IntraVENous Q24H  
 vancomycin (VANCOCIN) 1500 mg in  ml infusion  1,500 mg IntraVENous Q24H  
 acetaminophen (TYLENOL) tablet 325 mg  325 mg Oral Q4H PRN  
 albuterol (ACCUNEB) nebulizer solution 1.25 mg  1.25 mg Nebulization Q4H PRN  
 apixaban (ELIQUIS) tablet 5 mg  5 mg Oral BID  aspirin delayed-release tablet 81 mg  81 mg Oral DAILY  cholecalciferol (VITAMIN D3) (1000 Units /25 mcg) tablet 2 Tab  2,000 Units Oral DAILY  DULoxetine (CYMBALTA) capsule 60 mg  60 mg Oral DAILY  lactobac ac& pc-s.therm-b.anim (NUBIA Q/RISAQUAD)  1 Cap Oral DAILY  magnesium oxide (MAG-OX) tablet 400 mg  400 mg Oral QHS  mirtazapine (REMERON) tablet 15 mg  15 mg Oral QHS  pantoprazole (PROTONIX) tablet 40 mg  40 mg Oral ACB&D  potassium chloride SR (KLOR-CON 10) tablet 10 mEq  10 mEq Oral BID  predniSONE (DELTASONE) tablet 10 mg  10 mg Oral BID WITH MEALS Care Plan discussed with: Patient and Nurse Total time spent with patient and review of records: 30 minutes.  
Corwin Hancock MD

## 2020-06-01 NOTE — WOUND CARE
Wound care consult: 
Initial visit for skin assessment, alert- no distress Skin thin and fragile with multiple scattered bruises Assessment All skin folds and bony prominences assessed, turned with staff assistance. Pure wick in use- history of incontinence and moisture with open areas in the perineum. Skin is moist and red inner thighs and perineal area. Lower leg and feet edema with chronic discoloration and bruising Small open areas on left Right lateral leg ~ 3x3 cm raised fluid filled blister- stable- purple fluid- trauma sustained from the [address] prior to admission. Heels intact Treatment Incontinent care given zinc 
Repositioned in bed Heels floated Recommendations/Plan Wound and skin care orders in place. Turn, reposition every 2 hours as tolerated, float heels, off loading boots as tolerated Incontinent care --- Apply Zinc to all open areas, moisture barrier as needed. Will follow, reconsult as needed.  
 
[facility]

## 2020-06-01 NOTE — PROGRESS NOTES
PHYSICAL THERAPY TREATMENT Patient: Jesse Joseph (89 y.o. female) Date: 6/1/2020 Diagnosis: Sepsis ([facility] [A41.9] Sepsis ([facility] Precautions:   
Chart, physical therapy assessment, plan of care and goals were reviewed. ASSESSMENT Patient continues with skilled PT services and is progressing towards goals. Rolled on the edge of bed min assist, sit to stand min assist, ambulate with rolling walker CGA towards the chair. OOB to chair as tolerated performed some active range of motion exercise on both LE all planes. Educate and  Instructed patient to continue active range of motion exercise on both legs while up on chair or on bed. Communicated with nurse who agreed to monitor patient. Current Level of Function Impacting Discharge (mobility/balance): min assist with transfers and CGA with ambulation. Other factors to consider for discharge: fall PLAN : 
Patient continues to benefit from skilled intervention to address the above impairments. Continue treatment per established plan of care. to address goals. Recommendation for discharge: (in order for the patient to meet his/her long term goals) Physical therapy at least 2 days/week in the home This discharge recommendation: 
Has been made in collaboration with the attending provider and/or case management IF patient discharges home will need the following DME: patient owns DME required for discharge SUBJECTIVE:  
Patient stated ok.  OBJECTIVE DATA SUMMARY:  
Critical Behavior: 
Neurologic State: Alert Orientation Level: Oriented X4 Functional Mobility Training: 
Bed Mobility: 
Rolling: Minimum assistance Supine to Sit: Minimum assistance Sit to Supine: Minimum assistance Scooting: Minimum assistance Transfers: 
Sit to Stand: Contact guard assistance Stand to Sit: Contact guard assistance Stand Pivot Transfers: Contact guard assistance Bed to Chair: Contact guard assistance Balance: 
Sitting: Intact Standing: Intact; With support Ambulation/Gait Training: 
Distance (ft): 10 Feet (ft) Assistive Device: Walker, rolling;Gait belt Ambulation - Level of Assistance: Contact guard assistance Gait Description (WDL): Exceptions to [facility] Gait Abnormalities: Path deviations; Step to gait Base of Support: Widened Speed/Es: Pace decreased (<100 feet/min) Therapeutic Exercises:  
 Instructed patient to continue active range of motion exercise on both legs while up on chair or on bed. Pain Ratin/10 Activity Tolerance:  
Good Please refer to the flowsheet for vital signs taken during this treatment. After treatment patient left in no apparent distress:  
Sitting in chair, Heels elevated for pressure relief, Call bell within reach and Bed / chair alarm activated COMMUNICATION/COLLABORATION:  
The patients plan of care was discussed with: Occupational therapist and Registered nurse. Niru Gibson PT,WCC. Time Calculation: 24 mins

## 2020-06-01 NOTE — PROGRESS NOTES
TRANSFER - IN REPORT: 
 
Verbal report received from April(name) on Bianka Nava  being received from [facility](unit) for routine progression of care Report consisted of patients Situation, Background, Assessment and  
Recommendations(SBAR). Information from the following report(s) SBAR, Kardex, Intake/Output and Med Rec Status was reviewed with the receiving nurse. Opportunity for questions and clarification was provided. Assessment completed upon patients arrival to unit and care assumed. Bedside shift change report given to Fitz Rodney- (oncoming nurse) by Claudia Blas (offgoing nurse). Report included the following information SBAR, Kardex, Intake/Output and Recent Results.

## 2020-06-01 NOTE — PROGRESS NOTES
1900 
Bedside and Verbal shift change report given to [facility] (oncoming nurse) by Harsha Reyes (offgoing nurse). Report included the following information SBAR, Kardex, Procedure Summary, Intake/Output, MAR, Accordion, Recent Results and Cardiac Rhythm NSR- Tachy. Initial assessment done. No complaints of pain. On 3L NC Call bell within reach. Visit Vitals /56 (BP 1 Location: Left arm, BP Patient Position: At rest) Pulse 80 Temp 97.3 °F (36.3 °C) Resp 18 Ht 5' 7\" (1.702 m) Wt 111.7 kg (246 lb 3.2 oz) SpO2 98% Breastfeeding No  
BMI 38.56 kg/m²  
 
0700 Bedside and Verbal shift change report given to ApriL RN (oncoming nurse) by Ozzie Harman RN (offgoing nurse). Report included the following information SBAR, Kardex, Procedure Summary, Intake/Output, MAR, Accordion and Recent Results.

## 2020-06-02 NOTE — PROGRESS NOTES
Nutrition Assessment: 
 
RECOMMENDATIONS/INTERVENTION(S):  
1. Continue consistent carb diet. 2. Continue to monitor intakes, wt changes, labs. ASSESSMENT:  
6/2: Pt assessed for LOS. Admitted with sepsis. PMH includes CAD, DM, HTN, COPD. BMI 38.6, c/w obesity. Pt reports good appetite. Says she ate most of breakfast. Recorded intake of 75% breakfast yesterday. Says she was eating well PTA, 3 meals/d. Checks BG 1x/d in the mornings. Says she tries to follow a low sodium diet at home. Denies any recent weight changes. No c/o N/V. Labs- Mg 1.5, Ca 7.6, -208-636. Meds- Vit. D3, Faith-Q, Humalog, Remeron, KCl, prednisone. Diet Order: Consistent carb 
% Eaten:   
Patient Vitals for the past 72 hrs: 
 % Diet Eaten 06/01/20 0805 75 % Pertinent Medications: [x] Reviewed Labs: [x] Reviewed Anthropometrics: Height: 5' 7\" (170.2 cm) Weight: 111.7 kg (246 lb 3.2 oz) IBW (%IBW):   ( ) UBW (%UBW):   (  %) BMI: Body mass index is 38.56 kg/m². This BMI is indicative of: 
 [] Underweight    [] Normal    [] Overweight    [x]  Obesity    []  Extreme Obesity (BMI>40) Estimated Nutrition Needs (Based on): 2156 Kcals/day(1658 x 1.3 AF) , 111 g(0.8-1 g/kg) Protein Carbohydrate: At Least 130 g/day  Fluids: 2156 mL/day (1 ml/kcal) Last BM: 6/1   [x]Active     []Hyperactive  []Hypoactive       [] Absent   BS Skin:    [x] Intact   [] Incision  [] Breakdown   [] DTI   [] Tears/Excoriation/Abrasion  [x]Edema: 2+ LE, 1+ UE  [] Other: Wt Readings from Last 30 Encounters:  
05/30/20 111.7 kg (246 lb 3.2 oz)  
03/23/20 113.4 kg (250 lb) 01/31/20 120.9 kg (266 lb 9.6 oz) 10/31/19 112.9 kg (249 lb) 09/05/19 108.9 kg (240 lb) 08/30/19 109.8 kg (242 lb) 08/01/19 106.1 kg (234 lb)  
07/30/19 106.1 kg (234 lb)  
07/30/19 106.1 kg (234 lb)  
07/14/19 99.8 kg (220 lb)  
06/28/19 101.2 kg (223 lb)  
06/12/19 105 kg (231 lb 7.7 oz) 05/23/19 105.3 kg (232 lb 1.6 oz) 05/13/19 111.1 kg (245 lb) 04/27/19 118 kg (260 lb 3.2 oz)  
03/25/19 108.9 kg (240 lb) 01/07/19 99.3 kg (219 lb)  
11/19/18 99.4 kg (219 lb 3.2 oz)  
11/14/18 110.2 kg (243 lb) 10/19/18 118.8 kg (261 lb 14.5 oz) 10/17/18 112.5 kg (248 lb) 10/01/18 117.7 kg (259 lb 6.4 oz) 08/31/18 111.6 kg (246 lb)  
06/30/18 110.5 kg (243 lb 9.6 oz) 03/30/18 113.4 kg (250 lb) 04/16/17 70.3 kg (155 lb) 04/16/17 70.3 kg (155 lb)  
01/16/17 112 kg (247 lb)  
07/15/16 113.4 kg (250 lb) 02/09/16 117.5 kg (259 lb) NUTRITION DIAGNOSES:  
Problem:  No nutritional diagnosis at this time Etiology: related to Signs/Symptoms: as evidenced by NUTRITION INTERVENTIONS: 
Meals/Snacks: General/healthful diet GOAL:  
PO intake >75% meals next 5-7 days Cultural, Episcopalian, or Ethnic Dietary Needs: None EDUCATION & DISCHARGE NEEDS:  
 [x] None Identified 
 [] Identified and Education Provided/Documented 
 [] Identified and Pt declined/was not appropriate 
  
 [] Interdisciplinary Care Plan Reviewed/Documented  
 [x] Discharge Needs: consistent carb diet 
 [] No Nutrition Related Discharge Needs NUTRITION RISK:  
Pt Is At Nutrition Risk  [x] No Nutrition Risk Identified  [] PT SEEN FOR:  
 []  MD Consult: []Calorie Count []Diabetic Diet Education []Diet Education []Electrolyte Management []General Nutrition Management and Supplements []Management of Tube Feeding []TPN Recommendations []  RN Referral:  []MST score >=2 
   []Enteral/Parenteral Nutrition PTA []Pregnant: Gestational DM or Multigestation  
              [] Pressure Ulcer 
 
[]  Low BMI      [x]  Length of Stay       [] Dysphagia Diet         [] Ventilator 
[]  Follow-up Previous Recommendations: 
 [] Implemented          [] Not Implemented          [x] Not Applicable Previous Goal: 
 [] Met              [] Progressing Towards Goal              [] Not Progressing Towards Goal   [x] Not Applicable Ronny Cunningham, NADINEN Pager [phone] Phone [phone]

## 2020-06-02 NOTE — PROGRESS NOTES
Problem: Risk for Spread of Infection Goal: Prevent transmission of infectious organism to others Description: Prevent the transmission of infectious organisms to other patients, staff members, and visitors. Outcome: Progressing Towards Goal 
  
Problem: Falls - Risk of 
Goal: *Absence of Falls Description: Document Page Mitchell Fall Risk and appropriate interventions in the flowsheet. Outcome: Progressing Towards Goal 
Note: Fall Risk Interventions: 
Mobility Interventions: Bed/chair exit alarm, Utilize walker, cane, or other assistive device, Utilize gait belt for transfers/ambulation Medication Interventions: Bed/chair exit alarm, Patient to call before getting OOB, Teach patient to arise slowly Elimination Interventions: Bed/chair exit alarm, Call light in reach, Patient to call for help with toileting needs, Stay With Me (per policy), Toileting schedule/hourly rounds Shamar Smyth Bedside and Verbal shift change report given to Jennifer Vivas RN (oncoming nurse) by Ashia Greenberg RN (offgoing nurse). Report included the following information SBAR, Kardex, MAR, Recent Results and Cardiac Rhythm NSR.

## 2020-06-02 NOTE — PROGRESS NOTES
6/2/2020   CARE MANAGEMENT NOTE:  Pt transferred from [facility] to the 5th floor. EMR reviewed and handoff received from previous . Pt was admitted with sepsis 2/2 UTI. Also with Afib, CAD, DM. Reportedly, pt resides alone in an apartment in the [facility]. Dtr, Giovanna Tucker ([phone]) is her primary family contact. RUR 27% Transition Plan of Care: 1. PT/OT eval complete; pt ambulated 10 feet and HH was recommended. 2.  Pt will return home. Pt is currently receiving homecare skilled nursing thru [facility]. A resumption order will be needed. Also add PT services. 3.  Outpt f/u 4. Dtr will provide transportation home. Pt has her own O2 tank ([employer]) in her hospital room. CM will continue to follow pt until discharge. Jeanette

## 2020-06-02 NOTE — PROGRESS NOTES
I have reviewed discharge instructions with the patient. The patient verbalized understanding. No changes to medications. AVS signed via E-sign.

## 2020-06-02 NOTE — DISCHARGE SUMMARY
Physician Discharge Summary Patient ID:   
Mathew Burns [id number] 
65 y.o. 
1945 Gabby Schultz MD 
 
Admit date: 5/29/2020 Discharge date and time: 6/2/2020 Admission Diagnoses: Sepsis ([address] [A41.9] Discharge Medications:  
Current Discharge Medication List  
  
CONTINUE these medications which have NOT CHANGED Details  
metoprolol tartrate (LOPRESSOR) 25 mg tablet Take 25 mg by mouth daily as needed (For systolic >958). acetaminophen (TYLENOL) 325 mg tablet Take 1 Tab by mouth every four (4) hours as needed for Pain. Qty: 30 Tab, Refills: 0  
  
aspirin delayed-release 81 mg tablet Take 81 mg by mouth daily. glipiZIDE SR (GLUCOTROL XL) 10 mg CR tablet Take 10 mg by mouth daily. magnesium oxide (MAG-OX) 400 mg tablet Take 400 mg by mouth nightly. potassium chloride SR (KLOR-CON 10) 10 mEq tablet Take 10 mEq by mouth two (2) times a day. predniSONE (DELTASONE) 10 mg tablet Take 10 mg by mouth two (2) times daily (with meals). apixaban (ELIQUIS) 5 mg tablet Take 1 Tab by mouth two (2) times a day. Qty: 60 Tab, Refills: 0 Omeprazole delayed release (PRILOSEC D/R) 20 mg tablet Take 20 mg by mouth two (2) times a day. ibandronate (BONIVA) 150 mg tablet Take 150 mg by mouth every thirty (30) days. promethazine (PHENERGAN) 25 mg tablet Take 25 mg by mouth every eight (8) hours as needed for Nausea (Nausea not relieved by ondansetron). mirtazapine (REMERON) 15 mg tablet Take 15 mg by mouth nightly. tiotropium (SPIRIVA WITH HANDIHALER) 18 mcg inhalation capsule Take 1 Cap by inhalation daily. albuterol (PROVENTIL VENTOLIN) 2.5 mg /3 mL (0.083 %) nebulizer solution 2.5 mg by Nebulization route every six (6) hours as needed for Wheezing or Shortness of Breath. albuterol (PROAIR HFA) 90 mcg/actuation inhaler Take 2 Puffs by inhalation every four (4) hours as needed. lactobacillus rhamnosus gg 10 billion cell (CULTURELLE) 10 billion cell capsule Take 1 Cap by mouth daily. biotin 10,000 mcg cap Take 1 Cap by mouth daily. DULoxetine (CYMBALTA) 60 mg capsule Take 60 mg by mouth daily. cholecalciferol, vitamin D3, (Vitamin D3) 50 mcg (2,000 unit) tab Take 2,000 Units by mouth daily. azelastine-fluticasone (DYMISTA) 137-50 mcg/spray spry 2 Sprays by Nasal route daily. mometasone-formoterol (DULERA) 200-5 mcg/actuation HFA inhaler Take 2 Puffs by inhalation two (2) times a day. Follow up Care: 1. Mariya Huerta MD with in 1 weeks 2.  specialists as directed. Diet:  Diabetic Diet Disposition: 
Home. Advanced Directive: 
Discharge Exam: [See today's progress note.] CONSULTATIONS: None Significant Diagnostic Studies:  
Recent Labs  
  06/02/20 
0537 06/01/20 0030 WBC 6.1 8.7 HGB 9.9* 9.4* HCT 32.9* 31.5*  228 Recent Labs  
  06/02/20 
0537 06/01/20 
0030 05/31/20 
5207  140 138  
K 4.4 4.1 4.2 * 112* 113* CO2 23 22 19* BUN 16 18 16 CREA 0.93 1.19* 1.13* * 180* 171* CA 7.6* 7.1* 7.1* Recent Labs  
  06/02/20 0537 06/01/20 
0030 05/31/20 
7156 ALT 95* 103* 124* * 296* 289* TBILI 0.9 1.1* 1.3* TP 5.0* 5.0* 5.3* ALB 2.0* 2.0* 1.9*  
GLOB 3.0 3.0 3.4 Lab Results Component Value Date/Time TSH 0.58 11/13/2018 03:26 PM  
 
 
HOSPITAL COURSE & TREATMENT RENDERED:  
1. See today's progress note: 
 
Daily Progress Note and Discharge Note: 6/2/2020 Tanya Huerta MD 
  
   
Assessment/Plan:  
Sepsis [facility]) (7/13/2019) - 2/2 UTI. Kleb and E Coli ESBL 
-blood cultures neg at 2 days,  urine culture with gram neg rods 
-IV ceftriaxone for UTI - given Monural at discharge  
-started vanco for LLE cellulitis - legs about as usual so DCed Vanc as of 6/1  
  
Elevated LFT's - ?2/2 sepsis/hypotension. US without acute pathology  
-hepatitis panel neg -pt denies alcohol abuse  
-improving; also due to fatty liver 
  
Cellulitis of lower extremity (3/23/2020) 
- more due to stasis than infection at this point - cont to monitor 
  
UTI (urinary tract infection) (7/13/2019) - clinically improved with Rocephin - one dose of Monural prior to Discharge - E Coli ESBL and Klebsiella 
  
CAD (coronary artery disease) (10/9/2015)  
-on ASA -resume usual meds at discharge 
  
Type II diabetes mellitus ([facility] (10/9/2015) -ISS   
-glipizide due to sub par intake  
  
Essential hypertension (1/22/2016) -hold metoprolol for now; resume at DC likely 
  
Recurrent deep vein thrombosis (DVT) ([facility] (3/30/2018) 
-on Eliquis  
  
Chronic anticoagulation (3/30/2018) 
-on Eliquis  
  
Obesity (BMI 30-39.9) (11/13/2018) -weight loss  
  
Sleep apnea (1/5/2019- Overview: wears CPAP 
-CPAP  
  
Atrial fibrillation ([facility] (11/16/2018) -on metoprolol and Eliquis; BP's better and likely resume metoprolol at discharge  
  
COPD (chronic obstructive pulmonary disease) ([facility] (7/13/2019) - NOT exacerbation  
-continue home nebs  
  
Chronic LE stasis edema and stasis changes 
- monitor 
   
  
Problem List: 
          
Problem List as of 6/2/2020 Date Reviewed: 5/29/2020  
        Codes Class Noted - Resolved  
  Cellulitis of lower extremity ICD-10-CM: L03.119 ICD-9-CM: 218. 6   3/23/2020 - Present  
     
  ASO (arteriosclerosis obliterans) ICD-10-CM: I70.90 ICD-9-CM: 440. 9   9/5/2019 - Present  
     
  PVD (peripheral vascular disease) ([facility] ICD-10-CM: I73.9 ICD-9-CM: 443. 9   8/1/2019 - Present  
     
  Severe obesity ([facility] ICD-10-CM: E66.01 
ICD-9-CM: 278.01   7/30/2019 - Present  
     
  UTI (urinary tract infection) ICD-10-CM: N39.0 ICD-9-CM: 599.0   7/13/2019 - Present  
     
  COPD (chronic obstructive pulmonary disease) (HCC) ICD-10-CM: J44.9 ICD-9-CM: 496   7/13/2019 - Present  
     
  * (Principal) Sepsis ([facility] ICD-10-CM: A41.9 ICD-9-CM: 038.9, 995.91   7/13/2019 - Present  
     
  Normocytic anemia ICD-10-CM: D64.9 ICD-9-CM: 462. 9   7/13/2019 - Present  
     
  PAD (peripheral artery disease) (HCC) ICD-10-CM: I73.9 ICD-9-CM: 443. 9   7/13/2019 - Present  
     
  Mild intermittent asthma ICD-10-CM: J45.20 ICD-9-CM: 493.90   5/17/2019 - Present  
     
  Gangrene of finger of right hand ([facility] ICD-10-CM: P49 
ICD-9-CM: 785. 4   5/17/2019 - Present  
     
  Brachial artery thrombus (HCC) ICD-10-CM: G13.8 ICD-9-CM: 444.21   4/26/2019 - Present  
     
  Sleep apnea ICD-10-CM: G47.30 ICD-9-CM: 780.57   1/5/2019 - Present  
  Overview Signed 1/5/2019  8:41 PM by Amanda Ortega DO  
    wears CPAP 
   
  
     
  Atrial fibrillation ([facility] ICD-10-CM: I48.91 
ICD-9-CM: 427.31   11/16/2018 - Present  
     
  Obesity (BMI 30-39.9) (Chronic) ICD-10-CM: Q95.8 ICD-9-CM: 278.00   11/13/2018 - Present  
     
  Type 2 diabetes with nephropathy ([facility] ICD-10-CM: E11.21 
ICD-9-CM: 250.40, 583.81   10/1/2018 - Present  
     
  Recurrent deep vein thrombosis (DVT) (HCC) ICD-10-CM: I82.409 ICD-9-CM: 453.40   3/30/2018 - Present  
     
  Chronic anticoagulation (Chronic) ICD-10-CM: Z79.01 
ICD-9-CM: V58.61   3/30/2018 - Present  
     
  Coronary artery disease involving native coronary artery without angina pectoris (Chronic) ICD-10-CM: I25.10 ICD-9-CM: 414.01   1/22/2016 - Present  
     
  Essential hypertension (Chronic) ICD-10-CM: I10 
ICD-9-CM: 462. 9   1/22/2016 - Present  
     
  CAD (coronary artery disease) ICD-10-CM: I25.10 ICD-9-CM: 414.00   10/9/2015 - Present  
     
  Type II diabetes mellitus (HCC) (Chronic) ICD-10-CM: E11.9 ICD-9-CM: 250.00   10/9/2015 - Present  
     
  RESOLVED: Cellulitis ICD-10-CM: L03.90 ICD-9-CM: 334. 9   3/23/2020 - 5/29/2020  
     
  RESOLVED: Hypokalemia ICD-10-CM: E87.6 ICD-9-CM: 276.8   3/23/2020 - 5/29/2020  
     
  RESOLVED: Hyponatremia ICD-10-CM: E87.1 ICD-9-CM: 276.1   3/23/2020 - 5/29/2020  
     
  RESOLVED: Diarrhea ICD-10-CM: R19.7 ICD-9-CM: 787.91   3/23/2020 - 5/29/2020  
     
  RESOLVED: Syncope and collapse ICD-10-CM: R55 
ICD-9-CM: 780. 2   7/13/2019 - 5/29/2020  
     
  RESOLVED: Leg wound, left ICD-10-CM: Y54.836E ICD-9-CM: 891.0   7/13/2019 - 5/29/2020  
     
  RESOLVED: Leg laceration, right, initial encounter ICD-10-CM: G38.733R ICD-9-CM: 894.0   7/13/2019 - 5/29/2020  
     
  RESOLVED: Cellulitis of right upper arm ICD-10-CM: L03.113 ICD-9-CM: 838. 3   5/17/2019 - 5/29/2020  
     
  RESOLVED: Bowel obstruction ([address] ICD-10-CM: W00.408 ICD-9-CM: 560.9   1/8/2019 - 5/29/2020  
     
  RESOLVED: Partial small bowel obstruction ([address] ICD-10-CM: K56.600 ICD-9-CM: 560.9   1/5/2019 - 5/29/2020  
     
  RESOLVED: Dyspnea ICD-10-CM: R06.00 
ICD-9-CM: 786.09   11/13/2018 - 5/29/2020  
     
  RESOLVED: ARF (acute renal failure) (HCC) ICD-10-CM: N17.9 ICD-9-CM: 736. 9   11/13/2018 - 5/29/2020  
     
  RESOLVED: Closed wedge compression fracture of T7 vertebra (HCC) ICD-10-CM: Q37.743N ICD-9-CM: 688. 2   11/13/2018 - 5/29/2020  
     
  RESOLVED: Chest pain ICD-10-CM: R07.9 ICD-9-CM: 786.50   6/27/2018 - 5/29/2020  
     
  RESOLVED: Abdominal pain ICD-10-CM: R10.9 ICD-9-CM: 789.00   6/27/2018 - 5/29/2020  
     
  RESOLVED: HTN (hypertension) ICD-10-CM: I10 
ICD-9-CM: 974. 9   10/9/2015 - 1/22/2016  
     
  RESOLVED: NSTEMI (non-ST elevated myocardial infarction) ([address] ICD-10-CM: I21.4 ICD-9-CM: 410.70   10/9/2015 - 5/29/2020  
     
   
  
HPI:  
Ms. Jalloh is a 69 y.o.   female with PMH of a-fib, asthma, CAD, DM, DVT on Eliquis, HAYES on CPAP admitted for sepsis 2/2 UTI and cellulitis. Per pt, was feeling lethargic and sleeping all day. Nauseated. Noted foul smelling urine prompting her to come to the ED.  (Dr María Elena Barbour) 
   
5/30: Tired this am. Afebrile.  COVID pending.  
  
 : Feeling much better. Slept well. BC neg x 2 days. UC pending. Did not participate with PT/OT yesterday due to fatigue. Denies CP, palpitations, or SOB. COVID neg.  
  
:  Feeling much better. Slept well and reports not as fatigued. Urine Culture with >100K gram neg rods - awaiting final but clinically much better on Rocephin. Blood cult neg. Legs about as usual so will DC the Vanc and monitor.  
  
:  Continues to feel better and wants to go home. She reports her legs are not bothering her today. Urine culture resulted with E Coli ESBL and Klebsiella - will give one dose of Monural prior to discharge and monitor outpt. Stable for discharge. Follow up with Dr Sasha Vu later this wk or early next wk.   
  
Review of Systems: A comprehensive review of systems was negative except for that written in the HPI. 
  
Objective:  
Physical Exam:  
Visit Vitals /79 (BP 1 Location: Left arm, BP Patient Position: Supine; At rest) Pulse 96 Temp 97.8 °F (36.6 °C) Resp 18 Ht 5' 7\" (1.702 m) Wt 111.7 kg (246 lb 3.2 oz) SpO2 99% Breastfeeding No  
BMI 38.56 kg/m² O2 Flow Rate (L/min): 3 l/min O2 Device: CPAP mask Temp (24hrs), Av.1 °F (36.7 °C), Min:97.8 °F (36.6 °C), Max:98.4 °F (36.9 °C) No intake/output data recorded.  1901 -  0700 In: 410 [P.O.:360; I.V.:50] Out: 800 [Urine:800] General:  Alert, cooperative, no distress, appears stated age. Head:  Normocephalic, without obvious abnormality, atraumatic. Eyes:  Conjunctivae/corneas clear. EOMs intact. Throat: Lips, mucosa, and tongue normal. Teeth and gums normal.  
Neck: Supple, symmetrical, trachea midline, no adenopathy, thyroid: no enlargement/tenderness/nodules, no carotid bruit and no JVD. Lungs:   Clear to auscultation bilaterally. Chest wall:  No tenderness or deformity. Heart:  regular, S1, S2 normal, no murmur, click, rub or gallop. Abdomen:   Soft, mildly tender. RUQ, Bowel sounds normal. No masses,  No organomegaly. Extremities: Extremities with erythema BLE, warm to touch, no weeping - as usual.  No calf tenderness or cords. Pulses: 1+ and symmetric all extremities. Skin: Skin color, texture, turgor normal. No rashes Neurologic:  Alert and oriented X 3. Fine motor of hands and fingers normal.   equal.  No cogwheeling or rigidity. Gait not tested at this time. Sensation grossly normal to touch. Gross motor of extremities normal.    
  
Data Review: EXAM: XR CHEST PORT 5/29/20 INDICATION: dyspnea, cough COMPARISON: 3/23/2020 FINDINGS: A portable AP radiograph of the chest was obtained at 1422 hours. The 
patient is on a cardiac monitor. The heart size is normal. The lungs are clear. Patient is status post vertebroplasty in the mid thoracic spine. IMPRESSION: 
The lungs are clear of an acute process 
  EXAM: [facility]  5/29/20 INDICATION: Leukocytosis, elevated alkaline phosphatase, AST, SGPT, and serum 
bilirubin. Coronavirus suspected patient. COMPARISON: None TECHNIQUE: Limited abdominal ultrasound. FINDINGS: Limited by obesity and portable technique. Liver: The echogenicity of the liver is increased. This is a nonspecific finding 
but is most commonly seen with fatty infiltration of the liver. There are no 
obvious focal liver lesions. Main portal vein flow: Toward the liver. Fluid: No ascites. Gallbladder: Cholecystectomy. Bile ducts: There is no intra or extrahepatic biliary ductal dilatation.  The 
common bile duct measures 6 mm. Pancreas: The visualized portions are within normal limits. Kidneys: Right length: 9.8 cm. No hydronephrosis. Cortices are echogenic. IMPRESSION:  
Limited study. Normal CBD postcholecystectomy given patient's age. Probable 
hepatic steatosis.  Nonspecific medical renal disease.  
  
Signed: 
Jessi Arora MD 
6/2/2020 
10:51 AM

## 2020-06-02 NOTE — PROGRESS NOTES
Daily Progress Note and Discharge Note: 6/2/2020 Alexandria Stuart MD 
 
Assessment/Plan:  
Sepsis [facility]) (7/13/2019) - 2/2 UTI. Kleb and E Coli ESBL 
-blood cultures neg at 2 days,  urine culture with gram neg rods 
-IV ceftriaxone for UTI - given Monural at discharge  
-started vanco for LLE cellulitis - legs about as usual so DCed Vanc as of 6/1  
  
Elevated LFT's - ?2/2 sepsis/hypotension. US without acute pathology  
-hepatitis panel neg 
-pt denies alcohol abuse  
-improving; also due to fatty liver 
  
Cellulitis of lower extremity (3/23/2020) 
- more due to stasis than infection at this point - cont to monitor 
  
UTI (urinary tract infection) (7/13/2019) - clinically improved with Rocephin - one dose of Monural prior to Discharge - E Coli ESBL and Klebsiella 
  
CAD (coronary artery disease) (10/9/2015) -on ASA -resume usual meds at discharge 
  
Type II diabetes mellitus ([facility] (10/9/2015) -ISS   
-glipizide due to sub par intake  
  
Essential hypertension (1/22/2016) -hold metoprolol for now; resume at DC likely 
  
Recurrent deep vein thrombosis (DVT) ([address] (3/30/2018) 
-on Eliquis  
  
Chronic anticoagulation (3/30/2018) 
-on Eliquis  
  
Obesity (BMI 30-39.9) (11/13/2018) -weight loss  
  
Sleep apnea (1/5/2019- Overview: wears CPAP 
-CPAP  
  
Atrial fibrillation ([address] (11/16/2018) -on metoprolol and Eliquis; BP's better and likely resume metoprolol at discharge  
  
COPD (chronic obstructive pulmonary disease) ([facility] (7/13/2019) - NOT exacerbation  
-continue home nebs Chronic LE stasis edema and stasis changes 
- monitor Problem List: 
Problem List as of 6/2/2020 Date Reviewed: 5/29/2020 Codes Class Noted - Resolved Cellulitis of lower extremity ICD-10-CM: L03.119 ICD-9-CM: 682.6  3/23/2020 - Present ASO (arteriosclerosis obliterans) ICD-10-CM: I70.90 ICD-9-CM: 440.9  9/5/2019 - Present PVD (peripheral vascular disease) ([facility] ICD-10-CM: I73.9 ICD-9-CM: 443.9  8/1/2019 - Present Severe obesity ([facility] ICD-10-CM: E66.01 
ICD-9-CM: 278.01  7/30/2019 - Present UTI (urinary tract infection) ICD-10-CM: N39.0 ICD-9-CM: 599.0  7/13/2019 - Present COPD (chronic obstructive pulmonary disease) ([facility]) ICD-10-CM: J44.9 ICD-9-CM: 409  7/13/2019 - Present * (Principal) Sepsis ([place] [address] ICD-10-CM: A41.9 ICD-9-CM: 038.9, 995.91  7/13/2019 - Present Normocytic anemia ICD-10-CM: D64.9 ICD-9-CM: 285.9  7/13/2019 - Present PAD (peripheral artery disease) ([facility]) ICD-10-CM: I73.9 ICD-9-CM: 443.9  7/13/2019 - Present Mild intermittent asthma ICD-10-CM: J45.20 ICD-9-CM: 493.90  5/17/2019 - Present Gangrene of finger of right hand [facility]) ICD-10-CM: Y01 
ICD-9-CM: 785.4  5/17/2019 - Present Brachial artery thrombus ([facility]) ICD-10-CM: B88.6 ICD-9-CM: 444.21  4/26/2019 - Present Sleep apnea ICD-10-CM: G47.30 ICD-9-CM: 780.57  1/5/2019 - Present Overview Signed 1/5/2019  8:41 PM by Emeka Winter DO  
  wears CPAP Atrial fibrillation [facility]) ICD-10-CM: I48.91 
ICD-9-CM: 427.31  11/16/2018 - Present Obesity (BMI 30-39.9) (Chronic) ICD-10-CM: G60.5 ICD-9-CM: 278.00  11/13/2018 - Present Type 2 diabetes with nephropathy ([place] [address] ICD-10-CM: E11.21 
ICD-9-CM: 250.40, 583.81  10/1/2018 - Present Recurrent deep vein thrombosis (DVT) (HCC) ICD-10-CM: I82.409 ICD-9-CM: 453.40  3/30/2018 - Present Chronic anticoagulation (Chronic) ICD-10-CM: Z79.01 
ICD-9-CM: V58.61  3/30/2018 - Present Coronary artery disease involving native coronary artery without angina pectoris (Chronic) ICD-10-CM: I25.10 ICD-9-CM: 414.01  1/22/2016 - Present Essential hypertension (Chronic) ICD-10-CM: I10 
ICD-9-CM: 401.9  1/22/2016 - Present CAD (coronary artery disease) ICD-10-CM: I25.10 ICD-9-CM: 414.00  10/9/2015 - Present Type II diabetes mellitus (HCC) (Chronic) ICD-10-CM: E11.9 ICD-9-CM: 250.00  10/9/2015 - Present RESOLVED: Cellulitis ICD-10-CM: L03.90 ICD-9-CM: 682.9  3/23/2020 - 5/29/2020 RESOLVED: Hypokalemia ICD-10-CM: E87.6 ICD-9-CM: 276.8  3/23/2020 - 5/29/2020 RESOLVED: Hyponatremia ICD-10-CM: E87.1 ICD-9-CM: 276.1  3/23/2020 - 5/29/2020 RESOLVED: Diarrhea ICD-10-CM: R19.7 ICD-9-CM: 787.91  3/23/2020 - 5/29/2020 RESOLVED: Syncope and collapse ICD-10-CM: R55 
ICD-9-CM: 780.2  7/13/2019 - 5/29/2020 RESOLVED: Leg wound, left ICD-10-CM: U50.681W ICD-9-CM: 891.0  7/13/2019 - 5/29/2020 RESOLVED: Leg laceration, right, initial encounter ICD-10-CM: K58.456A ICD-9-CM: 894.0  7/13/2019 - 5/29/2020 RESOLVED: Cellulitis of right upper arm ICD-10-CM: L03.113 ICD-9-CM: 682.3  5/17/2019 - 5/29/2020 RESOLVED: Bowel obstruction ([facility] ICD-10-CM: C64.313 ICD-9-CM: 560.9  1/8/2019 - 5/29/2020 RESOLVED: Partial small bowel obstruction ([facility] ICD-10-CM: K56.600 ICD-9-CM: 560.9  1/5/2019 - 5/29/2020 RESOLVED: Dyspnea ICD-10-CM: R06.00 
ICD-9-CM: 786.09  11/13/2018 - 5/29/2020 RESOLVED: ARF (acute renal failure) (HCC) ICD-10-CM: N17.9 ICD-9-CM: 584.9  11/13/2018 - 5/29/2020 RESOLVED: Closed wedge compression fracture of T7 vertebra ([facility] ICD-10-CM: Q89.602J ICD-9-CM: 805.2  11/13/2018 - 5/29/2020 RESOLVED: Chest pain ICD-10-CM: R07.9 ICD-9-CM: 786.50  6/27/2018 - 5/29/2020 RESOLVED: Abdominal pain ICD-10-CM: R10.9 ICD-9-CM: 789.00  6/27/2018 - 5/29/2020 RESOLVED: HTN (hypertension) ICD-10-CM: I10 
ICD-9-CM: 401.9  10/9/2015 - 1/22/2016 RESOLVED: NSTEMI (non-ST elevated myocardial infarction) ([facility] ICD-10-CM: I21.4 ICD-9-CM: 410.70  10/9/2015 - 5/29/2020 HPI:  
Ms. Jaylan Alva is a 69 y.o.    female with PMH of a-fib, asthma, CAD, DM, DVT on Eliquis, HAYES on CPAP admitted for sepsis 2/2 UTI and cellulitis. Per pt, was feeling lethargic and sleeping all day. Nauseated. Noted foul smelling urine prompting her to come to the ED. (Dr Cuong Carmichael) 
   
: Tired this am. Afebrile. COVID pending.  
 
: Feeling much better. Slept well. BC neg x 2 days. UC pending. Did not participate with PT/OT yesterday due to fatigue. Denies CP, palpitations, or SOB. COVID neg. :  Feeling much better. Slept well and reports not as fatigued. Urine Culture with >100K gram neg rods - awaiting final but clinically much better on Rocephin. Blood cult neg. Legs about as usual so will DC the Vanc and monitor. :  Continues to feel better and wants to go home. She reports her legs are not bothering her today. Urine culture resulted with E Coli ESBL and Klebsiella - will give one dose of Monural prior to discharge and monitor outpt. Stable for discharge. Follow up with Dr Damon No later this wk or early next wk. Review of Systems: A comprehensive review of systems was negative except for that written in the HPI. Objective:  
Physical Exam:  
Visit Vitals /79 (BP 1 Location: Left arm, BP Patient Position: Supine; At rest) Pulse 96 Temp 97.8 °F (36.6 °C) Resp 18 Ht 5' 7\" (1.702 m) Wt 111.7 kg (246 lb 3.2 oz) SpO2 99% Breastfeeding No  
BMI 38.56 kg/m² O2 Flow Rate (L/min): 3 l/min O2 Device: CPAP mask Temp (24hrs), Av.1 °F (36.7 °C), Min:97.8 °F (36.6 °C), Max:98.4 °F (36.9 °C) No intake/output data recorded.  1901 -  0700 In: 410 [P.O.:360; I.V.:50] Out: 800 [Urine:800] General:  Alert, cooperative, no distress, appears stated age. Head:  Normocephalic, without obvious abnormality, atraumatic. Eyes:  Conjunctivae/corneas clear. EOMs intact. Throat: Lips, mucosa, and tongue normal. Teeth and gums normal.  
Neck: Supple, symmetrical, trachea midline, no adenopathy, thyroid: no enlargement/tenderness/nodules, no carotid bruit and no JVD. Lungs:   Clear to auscultation bilaterally. Chest wall:  No tenderness or deformity. Heart:  regular, S1, S2 normal, no murmur, click, rub or gallop. Abdomen:   Soft, mildly tender. RUQ, Bowel sounds normal. No masses,  No organomegaly. Extremities: Extremities with erythema BLE, warm to touch, no weeping - as usual.  No calf tenderness or cords. Pulses: 1+ and symmetric all extremities. Skin: Skin color, texture, turgor normal. No rashes Neurologic:  Alert and oriented X 3. Fine motor of hands and fingers normal.   equal.  No cogwheeling or rigidity. Gait not tested at this time. Sensation grossly normal to touch. Gross motor of extremities normal.    
 
Data Review: EXAM: XR CHEST PORT 5/29/20 INDICATION: dyspnea, cough COMPARISON: 3/23/2020 FINDINGS: A portable AP radiograph of the chest was obtained at 1422 hours. The 
patient is on a cardiac monitor. The heart size is normal. The lungs are clear. Patient is status post vertebroplasty in the mid thoracic spine. IMPRESSION: 
The lungs are clear of an acute process EXAM: US ABD LTD  5/29/20 INDICATION: Leukocytosis, elevated alkaline phosphatase, AST, SGPT, and serum 
bilirubin. Coronavirus suspected patient. COMPARISON: None TECHNIQUE: Limited abdominal ultrasound. FINDINGS: Limited by obesity and portable technique. Liver: The echogenicity of the liver is increased. This is a nonspecific finding 
but is most commonly seen with fatty infiltration of the liver. There are no 
obvious focal liver lesions. Main portal vein flow: Toward the liver. Fluid: No ascites. Gallbladder: Cholecystectomy. Bile ducts: There is no intra or extrahepatic biliary ductal dilatation. The 
common bile duct measures 6 mm. Pancreas: The visualized portions are within normal limits. Kidneys: Right length: 9.8 cm. No hydronephrosis. Cortices are echogenic. IMPRESSION:  
Limited study. Normal CBD postcholecystectomy given patient's age. Probable 
hepatic steatosis. Nonspecific medical renal disease. Recent Days: 
Recent Labs  
  06/02/20 
0537 06/01/20 
0030 05/31/20 
0520 WBC 6.1 8.7 7.2 HGB 9.9* 9.4* 10.1* HCT 32.9* 31.5* 34.4*  
 228 220 Recent Labs  
  06/02/20 
0537 06/01/20 
0030 05/31/20 
9675  140 138  
K 4.4 4.1 4.2 * 112* 113* CO2 23 22 19* * 180* 171* BUN 16 18 16 CREA 0.93 1.19* 1.13* CA 7.6* 7.1* 7.1* ALB 2.0* 2.0* 1.9* TBILI 0.9 1.1* 1.3* ALT 95* 103* 124* No results for input(s): PH, PCO2, PO2, HCO3, FIO2 in the last 72 hours. 24 Hour Results: 
Recent Results (from the past 24 hour(s)) GLUCOSE, POC Collection Time: 06/01/20  7:42 AM  
Result Value Ref Range Glucose (POC) 137 (H) 65 - 100 mg/dL Performed by [facility] April GLUCOSE, POC Collection Time: 06/01/20 11:58 AM  
Result Value Ref Range Glucose (POC) 132 (H) 65 - 100 mg/dL Performed by Yoseph Wray (PCT) GLUCOSE, POC Collection Time: 06/01/20  4:35 PM  
Result Value Ref Range Glucose (POC) 105 (H) 65 - 100 mg/dL Performed by Reece Christianson (PCT) GLUCOSE, POC Collection Time: 06/01/20  9:28 PM  
Result Value Ref Range Glucose (POC) 174 (H) 65 - 100 mg/dL Performed by Janelle Mike (PCT) CBC WITH AUTOMATED DIFF Collection Time: 06/02/20  5:37 AM  
Result Value Ref Range WBC 6.1 3.6 - 11.0 K/uL  
 RBC 3.25 (L) 3.80 - 5.20 M/uL HGB 9.9 (L) 11.5 - 16.0 g/dL HCT 32.9 (L) 35.0 - 47.0 % .2 (H) 80.0 - 99.0 FL  
 MCH 30.5 26.0 - 34.0 PG  
 MCHC 30.1 30.0 - 36.5 g/dL  
 RDW 16.4 (H) 11.5 - 14.5 % PLATELET 203 864 - 657 K/uL MPV 10.7 8.9 - 12.9 FL  
 NRBC 0.0 0  WBC ABSOLUTE NRBC 0.00 0.00 - 0.01 K/uL NEUTROPHILS 81 (H) 32 - 75 % LYMPHOCYTES 9 (L) 12 - 49 % MONOCYTES 9 5 - 13 % EOSINOPHILS 1 0 - 7 % BASOPHILS 0 0 - 1 % IMMATURE GRANULOCYTES 1 (H) 0.0 - 0.5 % ABS. NEUTROPHILS 4.9 1.8 - 8.0 K/UL ABS. LYMPHOCYTES 0.6 (L) 0.8 - 3.5 K/UL  
 ABS. MONOCYTES 0.6 0.0 - 1.0 K/UL  
 ABS. EOSINOPHILS 0.0 0.0 - 0.4 K/UL  
 ABS. BASOPHILS 0.0 0.0 - 0.1 K/UL  
 ABS. IMM. GRANS. 0.0 0.00 - 0.04 K/UL  
 DF AUTOMATED METABOLIC PANEL, COMPREHENSIVE Collection Time: 06/02/20  5:37 AM  
Result Value Ref Range Sodium 142 136 - 145 mmol/L Potassium 4.4 3.5 - 5.1 mmol/L Chloride 114 (H) 97 - 108 mmol/L  
 CO2 23 21 - 32 mmol/L Anion gap 5 5 - 15 mmol/L Glucose 168 (H) 65 - 100 mg/dL BUN 16 6 - 20 MG/DL Creatinine 0.93 0.55 - 1.02 MG/DL  
 BUN/Creatinine ratio 17 12 - 20 GFR est AA >60 >60 ml/min/1.73m2 GFR est non-AA 59 (L) >60 ml/min/1.73m2 Calcium 7.6 (L) 8.5 - 10.1 MG/DL Bilirubin, total 0.9 0.2 - 1.0 MG/DL  
 ALT (SGPT) 95 (H) 12 - 78 U/L  
 AST (SGOT) 51 (H) 15 - 37 U/L Alk. phosphatase 310 (H) 45 - 117 U/L Protein, total 5.0 (L) 6.4 - 8.2 g/dL Albumin 2.0 (L) 3.5 - 5.0 g/dL Globulin 3.0 2.0 - 4.0 g/dL A-G Ratio 0.7 (L) 1.1 - 2.2 GLUCOSE, POC Collection Time: 06/02/20  6:40 AM  
Result Value Ref Range Glucose (POC) 138 (H) 65 - 100 mg/dL Performed by Cecil Juárez (PCT) Medications reviewed Current Facility-Administered Medications Medication Dose Route Frequency  zinc oxide (DESITIN) 13 % topical cream   Topical DAILY  budesonide (PULMICORT) 500 mcg/2 ml nebulizer suspension  500 mcg Nebulization BID RT  
 arformoteroL (BROVANA) neb solution 15 mcg  15 mcg Nebulization BID RT  
 sodium chloride (NS) flush 5-40 mL  5-40 mL IntraVENous Q8H  
 sodium chloride (NS) flush 5-40 mL  5-40 mL IntraVENous PRN  
 acetaminophen (TYLENOL) tablet 650 mg  650 mg Oral Q4H PRN  
 HYDROcodone-acetaminophen (NORCO) 5-325 mg per tablet 1 Tab  1 Tab Oral Q4H PRN  
 morphine injection 2 mg  2 mg IntraVENous Q4H PRN  
 naloxone (NARCAN) injection 0.4 mg  0.4 mg IntraVENous PRN  
 ondansetron (ZOFRAN) injection 4 mg  4 mg IntraVENous Q4H PRN  
  glucose chewable tablet 16 g  4 Tab Oral PRN  
 dextrose (D50W) injection syrg 12.5-25 g  25-50 mL IntraVENous PRN  
 glucagon (GLUCAGEN) injection 1 mg  1 mg IntraMUSCular PRN  
 insulin lispro (HUMALOG) injection   SubCUTAneous AC&HS  cefTRIAXone (ROCEPHIN) 2 g in 0.9% sodium chloride (MBP/ADV) 50 mL  2 g IntraVENous Q24H  
 acetaminophen (TYLENOL) tablet 325 mg  325 mg Oral Q4H PRN  
 albuterol (ACCUNEB) nebulizer solution 1.25 mg  1.25 mg Nebulization Q4H PRN  
 apixaban (ELIQUIS) tablet 5 mg  5 mg Oral BID  aspirin delayed-release tablet 81 mg  81 mg Oral DAILY  cholecalciferol (VITAMIN D3) (1000 Units /25 mcg) tablet 2 Tab  2,000 Units Oral DAILY  DULoxetine (CYMBALTA) capsule 60 mg  60 mg Oral DAILY  lactobac ac& pc-s.therm-b.anim (NUBIA Q/RISAQUAD)  1 Cap Oral DAILY  magnesium oxide (MAG-OX) tablet 400 mg  400 mg Oral QHS  mirtazapine (REMERON) tablet 15 mg  15 mg Oral QHS  pantoprazole (PROTONIX) tablet 40 mg  40 mg Oral ACB&D  potassium chloride SR (KLOR-CON 10) tablet 10 mEq  10 mEq Oral BID  predniSONE (DELTASONE) tablet 10 mg  10 mg Oral BID WITH MEALS Care Plan discussed with: Patient and Nurse Total time spent with patient and review of records: 30 minutes.  
Rangel White MD

## 2020-06-03 NOTE — PROGRESS NOTES
Care transitions nurse - 6/3-7/3/2020 Ruy Blanco, RN, CHFN, [facility] Care transitions nurse [phone] [address] Coordination Team 
 
6/3/20 - Attempt to reach pt - phone rang then hung up - unable to LM - Sent my chart note - request for return call. 6/5 - Attempt to reach pt on both listed #s - no answer. DELANEY evaluation and covid 19 education. Interest in loop? 1 Follow up; Darya GUARDADO ([phone]) will resume homecare services. [facility] call agency directly with any questions. ttk 
_________________________________________________________________________________________ Assessment/Plan:  
Sepsis ([facility] (7/13/2019) - 2/2 UTI.  Kleb and E Coli ESBL 
-blood cultures neg at 2 days,  urine culture with gram neg rods 
-IV ceftriaxone for UTI - given Monural at discharge  
-started vanco for LLE cellulitis - legs about as usual so DCed Vanc as of 6/1  
  
Elevated LFT's - ?2/2 sepsis/hypotension. US without acute pathology  
-hepatitis panel neg 
-pt denies alcohol abuse  
-improving; also due to fatty liver 
  
Cellulitis of lower extremity (3/23/2020) 
- more due to stasis than infection at this point - cont to monitor 
  
UTI (urinary tract infection) (7/13/2019) 
- clinically improved with Rocephin - one dose of Monural prior to Discharge - E Coli ESBL and Klebsiella 
  
CAD (coronary artery disease) (10/9/2015)  
-on ASA -resume usual meds at discharge 
  
Type II diabetes mellitus ([address] (10/9/2015) -ISS   
-glipizide due to sub par intake  
  
Essential hypertension (1/22/2016) -hold metoprolol for now; resume at DC likely 
  
Recurrent deep vein thrombosis (DVT) ([facility] (3/30/2018) 
-on Eliquis  
  
Chronic anticoagulation (3/30/2018) 
-on Eliquis  
  
Obesity (BMI 30-39.9) (11/13/2018) -weight loss  
  
Sleep apnea (1/5/2019- Overview: wears CPAP 
-CPAP  
  
Atrial fibrillation ([facility] (11/16/2018) -on metoprolol and Eliquis; BP's better and likely resume metoprolol at discharge  
   
COPD (chronic obstructive pulmonary disease) ([facility] (7/13/2019) - NOT exacerbation  
-continue home nebs  
  
Chronic LE stasis edema and stasis changes 
- monitor DIAGNOSIS: 
1. Urinary tract infection without hematuria, site unspecified 2. Shortness of breath 3. Suspected 2019 novel coronavirus infection   
  
  
PLAN: 
Hospitalist Irena Serve for Admission 7:10 PM 
  
ED Room Number: GH40/43 Patient Name and age:  Dl Dodd 69 y.o.  female Working Diagnosis: 1. Urinary tract infection without hematuria, site unspecified 2. Shortness of breath 3. Suspected 2019 novel coronavirus infection 4. Abnormal liver function   
  
  
COVID-19 Suspicion:  yes

## 2020-07-31 NOTE — PROGRESS NOTES
Anne Caceres is a 69 y.o. female Chief Complaint Patient presents with  Follow-up 6 month f/u - HF, edema  Coronary Artery Disease  Hypertension Chest pain No 
 
SOB patient has SOB Dizziness No 
 
Swelling patient has swelling in legs and feet Refills No 
 
Visit Vitals /80 (BP 1 Location: Left arm, BP Patient Position: Sitting) Ht 5' 7\" (1.702 m) Wt 254 lb 12.8 oz (115.6 kg) BMI 39.91 kg/m² 1. Have you been to the ER, urgent care clinic since your last visit? Hospitalized since your last visit? [facility] 5/29-6/2/2020 
 
2. Have you seen or consulted any other health care providers outside of the [address] [place] [address] since your last visit? Include any pap smears or colon screening.   No

## 2020-07-31 NOTE — PROGRESS NOTES
Rishi Kirk MD 
 
[address] [facility] [place], [address] [facility] Office [phone] Mark Mace is a 69 y.o. female is here for f/u visit  CAD Primary care physician: Hermelinda Chong MD 
 
Patient Active Problem List  
Diagnosis Code  CAD (coronary artery disease) I25.10  Type II diabetes mellitus ([facility] E11.9  Coronary artery disease involving native coronary artery without angina pectoris I25.10  Essential hypertension I10  
 Recurrent deep vein thrombosis (DVT) ([facility]) I82.409  Chronic anticoagulation Z79.01  
 Type 2 diabetes with nephropathy ([facility]) E11.21  
 Obesity (BMI 30-39. 9) E66.9  Sleep apnea G47.30  Atrial fibrillation ([facility]) I48.91  
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  
 UTI (urinary tract infection) N39.0  
 COPD (chronic obstructive pulmonary disease) ([facility]) J44.9  Sepsis ([facility] A41.9  Normocytic anemia D64.9  
 PAD (peripheral artery disease) ([facility]) I73.9  Severe obesity ([facility]) E66.01  
 PVD (peripheral vascular disease) ([facility]) I73.9  ASO (arteriosclerosis obliterans) I70.90  Cellulitis of lower extremity L03.119 Chief complaint: 
Chief Complaint Patient presents with  Follow-up 6 month f/u - HF, edema  Coronary Artery Disease  Hypertension Dear Dr Sofya Buitrago, 
 
I had the pleasure of seeing Ms Mark Mace in the office today. Assessment: 
CAD s/p PCI to RCA with BMS ( NSTEMI 10/2015); 6/6/18- LAD stent/D1 PTCA ( CJW) Edema-component of chronic heart failure with preserved ejection fraction [facility] admission-6/2020-sepsis/UTI/cellulitis. HTN - low HLD - intolerant to multiple statins sec to myalgia DM - HbA1c 5.6 - Oct 2019 History of right DVT-October 2016; Has had prior DVT 1998 Chronic anticoagulation Asthma/COPD-on oxygen f/by Dr Yovany Ngo PAD - followed by Dr Belia Rodriguez. 9/9/19 L Fem-pop bypass with PTFE 
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 Syl Brown at [facility] Plan:  
 
 
Patient has had problems with bumetanide in as well as torsemide arthralgia/\"did not agree with her\". However, she is willing to retry the torsemide. She has 40 mg tablets at home and she will take half a tablet daily to see if her swelling in the lower extremities improves under dyspnea improves. If her dyspnea continues to worsen advised to follow-up with pulmonary. She takes metoprolol on a as needed basis. On Eliquis and ASA 81 mgdaily (Not on statin ( allergic/intolerant) -per patient her cholesterol is doing well. Has been intolerant to Lipitor, Crestor, Zocor-myalgias. She had her lipids checked in February 2018 ( PCP)  and it was elevated. We have tried to get her PCSK9 inhibitors. However the cost is prohibitive  Has tried Zetia but had side effects including muscle aches.) Aggressive cardiovascular risk factor modification. Follow-up in 2 mths Patient understands the plan. All questions were answered to the patient's satisfaction. Medication Side Effects and Warnings were discussed with patient: yes Patient Labs were reviewed and or requested:  yes Patient Past Records were reviewed and or requested: yes I appreciate the opportunity to be involved in Ms. Sellers. See note below for details. Please do not hesitate to contact us with questions or concerns. Neli Geller MD 
 
Cardiac Testing/ Procedures: A. Cardiac Cath/PCI: 6/6/18 At [address] [place] [address] - LAD stent/PTCA of D1 
 
10/9/15 L Main: Medl;Nml 
 
LAD: Prox- Med; 50%; Distal - small; D1 - Small to med - prox 60% LCflex: Large; Tortuous; MLI; GUILLERMO - med - MLI 
 
RCA: Med; Prox 80%; Distal 95% just before bifurcation into small PDA and PLB LVEDP: 22 LVEF: 55%/ Mild basal inf hypokinesis No significant gradient across aortic valve. PCI: JR4 guide/BMW Pre dil with 2 by 12 balloon BMS 2.25 by 22 deployed at high [place] distally BMS 2.5 by 18 deployed at high [place] proximal lesion Post dil with 2.5 by 15 B. ECHO/MEE: Echo 7/14/2019-EF 61 to 65%, mild LVH 
6/2018 - Left ventricle: Systolic function was normal. Ejection fraction was 
estimated to be 60 %. There were no regional wall motion abnormalities. Doppler parameters were consistent with abnormal left ventricular 
relaxation (grade 1 diastolic dysfunction). Aortic valve: Leaflets exhibited normal cuspal separation and good 
mobility. Not well visualized. 10/11/15 - Left ventricle: Systolic function was vigorous. Ejection fraction was 
estimated in the range of 65 % to 70 %. There were no regional wall motion 
abnormalities. C.StressNuclear/Stress ECHO/Stress test: D.Vascular: 9/9/19 L Fem-pop bypass with PTFE 
 
E. EP: 
 
F. Miscellaneous: 
 
Subjective: 
Liliana Tadeo is a 69 y.o. female who returns for follow up  Visit. No chest pain. Continues to have chronic dyspnea on exertion/wheezing. Has skin ecchymosis secondary to being on both Eliquis and Plavix. Home blood pressures usually in the 100- 108 range systolic. Has home health. She had a reaction to Bumex-\"headache, arthralgia, abdominal symptoms\" she was switched to torsemide but because she started having similar symptoms, she stopped to torsemide. Continues to have swelling lower extremities. Has open wounds to left lower extremities which is dressed by her home health nurse. JUne 2019 - L hand digit amputation at [facility] 
September 2019-left lower extremity bypass surgery by Dr. Kika Lawrence History of recurrent DVT. ROS:cc 
(bold if positive, if negative) edema Medications before admission: 
 
Current Outpatient Medications Medication Sig Dispense  metoprolol tartrate (LOPRESSOR) 25 mg tablet Take 25 mg by mouth daily as needed (For systolic >286).  acetaminophen (TYLENOL) 325 mg tablet Take 1 Tab by mouth every four (4) hours as needed for Pain. 30 Tab  aspirin delayed-release 81 mg tablet Take 81 mg by mouth daily.  glipiZIDE SR (GLUCOTROL XL) 10 mg CR tablet Take 10 mg by mouth daily.  magnesium oxide (MAG-OX) 400 mg tablet Take 400 mg by mouth nightly.  potassium chloride SR (KLOR-CON 10) 10 mEq tablet Take 10 mEq by mouth two (2) times a day.  predniSONE (DELTASONE) 10 mg tablet Take 10 mg by mouth two (2) times daily (with meals).  apixaban (ELIQUIS) 5 mg tablet Take 1 Tab by mouth two (2) times a day. 60 Tab  Omeprazole delayed release (PRILOSEC D/R) 20 mg tablet Take 20 mg by mouth two (2) times a day.  ibandronate (BONIVA) 150 mg tablet Take 150 mg by mouth every thirty (30) days.  promethazine (PHENERGAN) 25 mg tablet Take 25 mg by mouth every eight (8) hours as needed for Nausea (Nausea not relieved by ondansetron).  mirtazapine (REMERON) 15 mg tablet Take 15 mg by mouth nightly.  tiotropium (SPIRIVA WITH HANDIHALER) 18 mcg inhalation capsule Take 1 Cap by inhalation daily.  albuterol (PROVENTIL VENTOLIN) 2.5 mg /3 mL (0.083 %) nebulizer solution 2.5 mg by Nebulization route every six (6) hours as needed for Wheezing or Shortness of Breath.  albuterol (PROAIR HFA) 90 mcg/actuation inhaler Take 2 Puffs by inhalation every four (4) hours as needed.  lactobacillus rhamnosus gg 10 billion cell (CULTURELLE) 10 billion cell capsule Take 1 Cap by mouth daily.  biotin 10,000 mcg cap Take 1 Cap by mouth daily.  DULoxetine (CYMBALTA) 60 mg capsule Take 60 mg by mouth daily.  cholecalciferol, vitamin D3, (Vitamin D3) 50 mcg (2,000 unit) tab Take 2,000 Units by mouth daily.  azelastine-fluticasone (DYMISTA) 137-50 mcg/spray spry 2 Sprays by Nasal route daily.  mometasone-formoterol (DULERA) 200-5 mcg/actuation HFA inhaler Take 2 Puffs by inhalation two (2) times a day. No current facility-administered medications for this visit. Physical Exam: 
Visit Vitals /80 (BP 1 Location: Left arm, BP Patient Position: Sitting) Pulse 90 Ht 5' 7\" (1.702 m) Wt 254 lb 12.8 oz (115.6 kg) BMI 39.91 kg/m² Gen: Well-developed, well-nourished, in no acute distress, Obese Neck: Supple,No JVD, No Carotid Bruit, Resp: No accessory muscle use, Bilat  rhonchi 
Card: Regular Rate,Rythm,Normal S1, S2, No murmurs, rubs or gallop. No thrills. Abd:  Soft, non-tender, non-distended,BS+,  
MSK: No cyanosis Skin: Ecchymosis+ Neuro: moving all four extremities , follows commands appropriately Psych:  Good insight, oriented to person, place , alert, Nml Affect LE: 1+edema EKG:   
 
 
 
LABS: 
 
 
 
Lab Results Component Value Date/Time WBC 6.1 06/02/2020 05:37 AM  
 HGB 9.9 (L) 06/02/2020 05:37 AM  
 HCT 32.9 (L) 06/02/2020 05:37 AM  
 PLATELET 828 12/21/4611 05:37 AM  
 .2 (H) 06/02/2020 05:37 AM  
 
Lab Results Component Value Date/Time Sodium 142 06/02/2020 05:37 AM  
 Potassium 4.4 06/02/2020 05:37 AM  
 Chloride 114 (H) 06/02/2020 05:37 AM  
 CO2 23 06/02/2020 05:37 AM  
 Anion gap 5 06/02/2020 05:37 AM  
 Glucose 168 (H) 06/02/2020 05:37 AM  
 BUN 16 06/02/2020 05:37 AM  
 Creatinine 0.93 06/02/2020 05:37 AM  
 BUN/Creatinine ratio 17 06/02/2020 05:37 AM  
 GFR est AA >60 06/02/2020 05:37 AM  
 GFR est non-AA 59 (L) 06/02/2020 05:37 AM  
 Calcium 7.6 (L) 06/02/2020 05:37 AM  
 
 
Lab Results Component Value Date/Time  
 aPTT 20.6 (L) 08/30/2019 03:18 PM  
 
Lab Results Component Value Date/Time INR 1.0 08/30/2019 03:18 PM  
 INR 1.0 07/30/2019 09:35 AM  
 INR 1.1 04/26/2019 10:52 AM  
 Prothrombin time 9.8 08/30/2019 03:18 PM  
 Prothrombin time 9.7 07/30/2019 09:35 AM  
 Prothrombin time 11.3 (H) 04/26/2019 10:52 AM  
 
No components found for: LAST LIPIDS Nathan Perez MD

## 2020-09-29 PROBLEM — R55 SYNCOPE AND COLLAPSE: Status: ACTIVE | Noted: 2020-01-01

## 2020-09-29 NOTE — Clinical Note
PT POSITIONED IN BED FOR EXTUBATION. TRANSFERRED TO PACU 1 POST EXTUBATION ACCOMPANIED BY ANESTHESIA AND RN. ACCESS SITES CLEAN, DRY AND INTACT WITH DRY STERILE GAUZE DRESSING.  NO BLEEDING OR HEMATOMA NOTED

## 2020-09-30 NOTE — PROGRESS NOTES
2000 Hour MEWS Score 3 d/t elevated heart rete, will monitor closely. Bedside, Verbal and Written shift change report given to FARIDA Gallagher RN (oncoming nurse) by Megan Correa (offgoing nurse). Report included the following information SBAR, Kardex, Intake/Output, MAR, Recent Results and Cardiac Rhythm sinus tachycardia. Tomnoemi Broussardtise

## 2020-09-30 NOTE — PROGRESS NOTES
TRANSFER - IN REPORT: 
 
Verbal report received from Bharati Rn(name) on Richie Clock  being received from ED(unit) for routine progression of care Report consisted of patients Situation, Background, Assessment and  
Recommendations(SBAR). Information from the following report(s) SBAR, Kardex, ED Summary, Intake/Output, MAR and Recent Results was reviewed with the receiving nurse. Opportunity for questions and clarification was provided. Assessment completed upon patients arrival to unit and care assumed. 1 unit of blood being infused at time of patient arrival.  
 
0330: Phlebotomist attempted to get labs x3 without success. Will call ICU nurse to try and get labs. 0500: Noted pt has not voided since being admitted. Bladder scanned pt and max volume scanned was >680. Will notify MD.  
 
Javier Smith: Called Dr. Alexandro Rubio pager number, no answer. 18: Noticed Dr. Gregory Barragan had H&P notes, will call MD and notify her about scan results. Received orders to straight cath patient. 0600: Straight catheter output 875. 
 
0615: ICU nurse failed to get lab drawn. Will notify MD and dayshift RN.  
 
6315: Bedside and Verbal shift change report given to [facility] and Page Long RN (oncoming nurse) by Mark Ding RN (offgoing nurse). Report included the following information SBAR, Kardex, Intake/Output, MAR and Recent Results.

## 2020-09-30 NOTE — PROGRESS NOTES
Daily Progress Note: 9/30/2020 Heath Steele MD 
 
Assessment/Plan:  
Acute Syncopal Episode with fracture L2 
- Pain control 
- Ortho consult CAD- stable CHF 
- Diuretics as needed Chronic A-fib 
- Holding Eliquis - Cards consult - Metoprolol Acute on chronic anemia - Transfused 2 units last night - AM labs pending - GI consult - PPI Dehydration 
- IVF 
 
DM2 
- Monitor BG ac and hs - Check A1c 
 
HAYES - CPAP Depression/Anxiety - Remeron and Cymbata Problem List: 
Problem List as of 9/30/2020 Date Reviewed: 5/29/2020 Codes Class Noted - Resolved Syncope and collapse ICD-10-CM: R55 
ICD-9-CM: 780.2  9/29/2020 - Present Cellulitis of lower extremity ICD-10-CM: L03.119 ICD-9-CM: 682.6  3/23/2020 - Present ASO (arteriosclerosis obliterans) ICD-10-CM: I70.90 ICD-9-CM: 440.9  9/5/2019 - Present PVD (peripheral vascular disease) ([facility] ICD-10-CM: I73.9 ICD-9-CM: 443.9  8/1/2019 - Present Severe obesity ([facility] ICD-10-CM: E66.01 
ICD-9-CM: 278.01  7/30/2019 - Present UTI (urinary tract infection) ICD-10-CM: N39.0 ICD-9-CM: 599.0  7/13/2019 - Present COPD (chronic obstructive pulmonary disease) ([facility]) ICD-10-CM: J44.9 ICD-9-CM: 282  7/13/2019 - Present Sepsis ([facility] ICD-10-CM: A41.9 ICD-9-CM: 038.9, 995.91  7/13/2019 - Present Normocytic anemia ICD-10-CM: D64.9 ICD-9-CM: 285.9  7/13/2019 - Present PAD (peripheral artery disease) ([facility]) ICD-10-CM: I73.9 ICD-9-CM: 443.9  7/13/2019 - Present Mild intermittent asthma ICD-10-CM: J45.20 ICD-9-CM: 493.90  5/17/2019 - Present Gangrene of finger of right hand [facility]) ICD-10-CM: C42 
ICD-9-CM: 785.4  5/17/2019 - Present Brachial artery thrombus (HCC) ICD-10-CM: F75.5 ICD-9-CM: 444.21  4/26/2019 - Present Sleep apnea ICD-10-CM: G47.30 ICD-9-CM: 780.57  1/5/2019 - Present Overview Signed 1/5/2019  8:41 PM by DO jose raul Kebede CPAP Atrial fibrillation [facility]) ICD-10-CM: I48.91 
ICD-9-CM: 427.31  11/16/2018 - Present Obesity (BMI 30-39.9) (Chronic) ICD-10-CM: O86.4 ICD-9-CM: 278.00  11/13/2018 - Present Type 2 diabetes with nephropathy ([facility] ICD-10-CM: E11.21 
ICD-9-CM: 250.40, 583.81  10/1/2018 - Present Recurrent deep vein thrombosis (DVT) (HCC) ICD-10-CM: I82.409 ICD-9-CM: 453.40  3/30/2018 - Present Chronic anticoagulation (Chronic) ICD-10-CM: Z79.01 
ICD-9-CM: V58.61  3/30/2018 - Present Coronary artery disease involving native coronary artery without angina pectoris (Chronic) ICD-10-CM: I25.10 ICD-9-CM: 414.01  1/22/2016 - Present Essential hypertension (Chronic) ICD-10-CM: I10 
ICD-9-CM: 401.9  1/22/2016 - Present CAD (coronary artery disease) ICD-10-CM: I25.10 ICD-9-CM: 414.00  10/9/2015 - Present Type II diabetes mellitus (HCC) (Chronic) ICD-10-CM: E11.9 ICD-9-CM: 250.00  10/9/2015 - Present RESOLVED: Cellulitis ICD-10-CM: L03.90 ICD-9-CM: 682.9  3/23/2020 - 5/29/2020 RESOLVED: Hypokalemia ICD-10-CM: E87.6 ICD-9-CM: 276.8  3/23/2020 - 5/29/2020 RESOLVED: Hyponatremia ICD-10-CM: E87.1 ICD-9-CM: 276.1  3/23/2020 - 5/29/2020 RESOLVED: Diarrhea ICD-10-CM: R19.7 ICD-9-CM: 787.91  3/23/2020 - 5/29/2020 RESOLVED: Syncope and collapse ICD-10-CM: R55 
ICD-9-CM: 780.2  7/13/2019 - 5/29/2020 RESOLVED: Leg wound, left ICD-10-CM: P46.351K ICD-9-CM: 891.0  7/13/2019 - 5/29/2020 RESOLVED: Leg laceration, right, initial encounter ICD-10-CM: E30.535E ICD-9-CM: 894.0  7/13/2019 - 5/29/2020 RESOLVED: Cellulitis of right upper arm ICD-10-CM: L03.113 ICD-9-CM: 682.3  5/17/2019 - 5/29/2020 RESOLVED: Bowel obstruction ([facility] ICD-10-CM: N23.961 ICD-9-CM: 560.9  1/8/2019 - 5/29/2020 RESOLVED: Partial small bowel obstruction ([facility] ICD-10-CM: K56.600 ICD-9-CM: 560.9  1/5/2019 - 5/29/2020 RESOLVED: Dyspnea ICD-10-CM: R06.00 
ICD-9-CM: 786.09  11/13/2018 - 5/29/2020 RESOLVED: ARF (acute renal failure) (HCC) ICD-10-CM: N17.9 ICD-9-CM: 584.9  11/13/2018 - 5/29/2020 RESOLVED: Closed wedge compression fracture of T7 vertebra ([facility] ICD-10-CM: K56.604P ICD-9-CM: 805.2  11/13/2018 - 5/29/2020 RESOLVED: Chest pain ICD-10-CM: R07.9 ICD-9-CM: 786.50  6/27/2018 - 5/29/2020 RESOLVED: Abdominal pain ICD-10-CM: R10.9 ICD-9-CM: 789.00  6/27/2018 - 5/29/2020 RESOLVED: HTN (hypertension) ICD-10-CM: I10 
ICD-9-CM: 401.9  10/9/2015 - 1/22/2016 RESOLVED: NSTEMI (non-ST elevated myocardial infarction) ([facility] ICD-10-CM: I21.4 ICD-9-CM: 410.70  10/9/2015 - 5/29/2020 HPI:  
 33-year-old female with multiple medical problems including obesity, sleep apnea, non-ST-elevation myocardial infarction, primary hypertension, congestive heart failure, gastroesophageal reflux disease, type 2 diabetes mellitus, temporal arteritis, fibromyalgia, atrial fibrillation and asthma, was brought to the emergency room from home for complaints of lightheadedness, dizziness with subsequent fall and low back pain. Patient stated that whilst preparing some dinner in the evening hours of 09/29/2020, had the onset of symptoms with subsequent fall in a sitting position. Thereafter, patient noted persistent low back pain. Patient denied any recent changes in medication. Denied headaches, visual deficits, chest pain, palpitations, sore throat, cough, shortness of breath, nausea, vomiting, fevers, chills, abdominal pain, bladder or bowel irregularities. (Dr Fabiola Adler) 9/30: She is c/o pain in her back. Meds are helping. Hb 5.1 and she has been transfused with 2 units.  AM labs pending and she will need a line as she is a difficult stick. May need more blood. She reports her stools have been dark so will ask GI to see. Holding Eliquis. She is on chronic steroids due to TA. Review of Systems: A comprehensive review of systems was negative except for that written in the HPI. Objective:  
Physical Exam:  
 
Visit Vitals BP (!) 140/58 (BP 1 Location: Left arm, BP Patient Position: At rest) Pulse (!) 110 Temp 98.4 °F (36.9 °C) Resp 18 Wt 254 lb 12.8 oz (115.6 kg) SpO2 100% BMI 39.91 kg/m² O2 Flow Rate (L/min): 3 l/min O2 Device: Nasal cannula Temp (24hrs), Av.1 °F (36.7 °C), Min:97.8 °F (36.6 °C), Max:98.6 °F (37 °C) No intake/output data recorded.  1901 -  0700 In: 572.5 Out: 8[address] Fresh General:  Alert, cooperative, no distress, obese Head:  Normocephalic, without obvious abnormality, atraumatic. Eyes:  Conjunctivae/corneas clear. PERRL, EOMs intact. Nose: Nares normal. Septum midline. Mucosa normal. No drainage or sinus tenderness. Throat: Lips, mucosa, and tongue normal. Teeth and gums normal.  
Neck: Supple, symmetrical, trachea midline, no adenopathy, thyroid: no enlargement/tenderness/nodules, no carotid bruit and no JVD. Back:   Symmetric, no curvature. ROM normal. No CVA tenderness. Lungs:   Clear to auscultation bilaterally. Chest wall:  No tenderness or deformity. Heart:  Irregular rhythm, tachy rate, S1, S2 normal, no murmur, click, rub or gallop. Abdomen:   Soft, non-tender. Bowel sounds normal. No masses,  No organomegaly. Extremities: Extremities with weeping and swelling, mildly erythematous,  No calf tenderness or cords. Pulses: 1+ and symmetric all extremities. Skin: Skin color, texture, turgor normal.   
Neurologic: CNII-XII intact. Alert and oriented X 3. Fine motor of hands and fingers normal.   equal.  No cogwheeling or rigidity. Gait not tested at this time. Sensation grossly normal to touch.   Gross motor of extremities normal.    
 
Data Review:  
CT Head IMPRESSION: No acute findings. EXAM:  CT CERVICAL SPINE WITHOUT CONTRAST 
  
INDICATION: fall w head injury. 
  
COMPARISON: None. 
  
CONTRAST:  None. 
  
TECHNIQUE: Multislice helical CT of the cervical spine was performed without 
intravenous contrast administration. Sagittal and coronal reformats were 
generated. CT dose reduction was achieved through use of a standardized 
protocol tailored for this examination and automatic exposure control for dose 
modulation.  
  
FINDINGS: 
  
Alignment is satisfactory, no acute fracture or dislocation. Moderate spondylosis and disc degeneration at C5-6 
  
IMPRESSION 
 impression: No acute changes. CT LS spine Axial CT scan of the lumbar sacral spine obtained without contrast with coronal 
and sagittal reconstructions. No prior. CT dose reduction was achieved through 
the use of a standardized protocol tailored for this examination and automatic 
exposure control for dose modulation . . 
  
There is a minimally displaced fracture of the superior endplate of L3 without 
significant canal compromise. There is some mild diffuse spondylosis and disc degeneration. 
  
IMPRESSION 
 impression: Nondisplaced acute fracture L3 superior endplate. Recent Days: 
Recent Labs  
  09/29/20 2027 WBC 20.3* HGB 5.1*  
HCT 19.6*  Recent Labs  
  09/29/20 2027   
K 4.3  CO2 30 * BUN 29* CREA 1.07* CA 7.7* MG 2.2  
24 Hour Results: 
Recent Results (from the past 24 hour(s)) EKG, 12 LEAD, INITIAL Collection Time: 09/29/20  8:16 PM  
Result Value Ref Range Ventricular Rate 99 BPM  
 Atrial Rate 99 BPM  
 P-R Interval 140 ms QRS Duration 84 ms Q-T Interval 340 ms QTC Calculation (Bezet) 436 ms Calculated P Axis 65 degrees Calculated R Axis -32 degrees Calculated T Axis 34 degrees Diagnosis Sinus rhythm with marked sinus arrhythmia Left axis deviation Low voltage QRS Abnormal ECG When compared with ECG of 29-MAY-2020 14:07, Minimal criteria for Anterior infarct are no longer present T wave inversion no longer evident in Anterior leads CBC WITH AUTOMATED DIFF Collection Time: 09/29/20  8:27 PM  
Result Value Ref Range WBC 20.3 (H) 3.6 - 11.0 K/uL  
 RBC 2.48 (L) 3.80 - 5.20 M/uL HGB 5.1 (LL) 11.5 - 16.0 g/dL HCT 19.6 (L) 35.0 - 47.0 % MCV 79.0 (L) 80.0 - 99.0 FL  
 MCH 20.6 (L) 26.0 - 34.0 PG  
 MCHC 26.0 (L) 30.0 - 36.5 g/dL  
 RDW 18.3 (H) 11.5 - 14.5 % PLATELET 316 302 - 282 K/uL MPV 10.2 8.9 - 12.9 FL  
 NRBC 0.4 (H) 0  WBC ABSOLUTE NRBC 0.09 (H) 0.00 - 0.01 K/uL NEUTROPHILS 89 (H) 32 - 75 % BAND NEUTROPHILS 2 0 - 6 % LYMPHOCYTES 3 (L) 12 - 49 % MONOCYTES 6 5 - 13 % EOSINOPHILS 0 0 - 7 % BASOPHILS 0 0 - 1 % IMMATURE GRANULOCYTES 0 %  
 ABS. NEUTROPHILS 18.5 (H) 1.8 - 8.0 K/UL  
 ABS. LYMPHOCYTES 0.6 (L) 0.8 - 3.5 K/UL  
 ABS. MONOCYTES 1.2 (H) 0.0 - 1.0 K/UL  
 ABS. EOSINOPHILS 0.0 0.0 - 0.4 K/UL  
 ABS. BASOPHILS 0.0 0.0 - 0.1 K/UL  
 ABS. IMM. GRANS. 0.0 K/UL  
 DF MANUAL    
 RBC COMMENTS ANISOCYTOSIS 1+ 
    
 RBC COMMENTS MICROCYTOSIS 1+ 
    
 RBC COMMENTS HYPOCHROMIA 2+ 
    
 RBC COMMENTS POLYCHROMASIA PRESENT 
    
 RBC COMMENTS OVALOCYTES PRESENT 
    
METABOLIC PANEL, BASIC Collection Time: 09/29/20  8:27 PM  
Result Value Ref Range Sodium 137 136 - 145 mmol/L Potassium 4.3 3.5 - 5.1 mmol/L Chloride 103 97 - 108 mmol/L  
 CO2 30 21 - 32 mmol/L Anion gap 4 (L) 5 - 15 mmol/L Glucose 190 (H) 65 - 100 mg/dL BUN 29 (H) 6 - 20 MG/DL Creatinine 1.07 (H) 0.55 - 1.02 MG/DL  
 BUN/Creatinine ratio 27 (H) 12 - 20 GFR est AA >60 >60 ml/min/1.73m2 GFR est non-AA 50 (L) >60 ml/min/1.73m2 Calcium 7.7 (L) 8.5 - 10.1 MG/DL MAGNESIUM Collection Time: 09/29/20  8:27 PM  
Result Value Ref Range Magnesium 2.2 1.6 - 2.4 mg/dL NT-PRO BNP  
 Collection Time: 09/29/20  8:27 PM  
Result Value Ref Range NT pro-BNP 1,562 (H) <125 PG/ML  
TROPONIN I Collection Time: 09/29/20  8:27 PM  
Result Value Ref Range Troponin-I, Qt. <0.05 <0.05 ng/mL SAMPLES BEING HELD Collection Time: 09/29/20  8:27 PM  
Result Value Ref Range SAMPLES BEING HELD SST. CHELY   
 COMMENT Add-on orders for these samples will be processed based on acceptable specimen integrity and analyte stability, which may vary by analyte. RBC, ALLOCATE Collection Time: 09/29/20  9:00 PM  
Result Value Ref Range HISTORY CHECKED? Historical check performed TYPE & SCREEN Collection Time: 09/29/20  9:31 PM  
Result Value Ref Range Crossmatch Expiration 10/02/2020 ABO/Rh(D) A POSITIVE Antibody screen NEG Unit number [id number] Blood component type [facility] Unit division 00 Status of unit TRANSFUSED Crossmatch result Compatible Unit number [id number] Blood component type [facility] Unit division 00 Status of unit ALLOCATED Crossmatch result Compatible OCCULT BLOOD, STOOL Collection Time: 09/29/20 10:14 PM  
Result Value Ref Range Occult blood, stool Negative NEG    
RBC, ALLOCATE Collection Time: 09/29/20 11:30 PM  
Result Value Ref Range HISTORY CHECKED? Historical check performed Medications reviewed Current Facility-Administered Medications Medication Dose Route Frequency  influenza vaccine 2020-21 (6 mos+)(PF) (FLUARIX/FLULAVAL/FLUZONE QUAD) injection 0.5 mL  0.5 mL IntraMUSCular PRIOR TO DISCHARGE  
 0.9% sodium chloride infusion 250 mL  250 mL IntraVENous PRN  
 sodium chloride (NS) flush 5-40 mL  5-40 mL IntraVENous Q8H  
 sodium chloride (NS) flush 5-40 mL  5-40 mL IntraVENous PRN  
 acetaminophen (TYLENOL) tablet 650 mg  650 mg Oral Q6H PRN Or  
 acetaminophen (TYLENOL) suppository 650 mg  650 mg Rectal Q6H PRN  polyethylene glycol (MIRALAX) packet 17 g  17 g Oral DAILY PRN  
  promethazine (PHENERGAN) tablet 12.5 mg  12.5 mg Oral Q6H PRN Or  
 ondansetron (ZOFRAN) injection 4 mg  4 mg IntraVENous Q6H PRN  
 glucose chewable tablet 16 g  4 Tab Oral PRN  
 dextrose (D50W) injection syrg 12.5-25 g  25-50 mL IntraVENous PRN  
 glucagon (GLUCAGEN) injection 1 mg  1 mg IntraMUSCular PRN  
 insulin lispro (HUMALOG) injection   SubCUTAneous AC&HS  
 0.9% sodium chloride infusion 250 mL  250 mL IntraVENous PRN Care Plan discussed with: Patient/Family and Nurse Total time spent with patient and review of records: 30 minutes.  
 
Franchesca Harris MD

## 2020-09-30 NOTE — PROGRESS NOTES
MIDLINE PLACEMENT NOTE Midline catheters are NOT central lines. Approved midline products are peripheral infusion devices with the tip terminating in the arm at or below the axillary vein, distal to the shoulder. The tip DOES NOT ENTER THE CENTRAL VASCULATURE. A Midline is for reliable short term (less than 30 days) vascular access. PRE-PROCEDURE VERIFICATION Correct Procedure: yes Correct Site:  yes Temperature: Temp: 97.5 °F (36.4 °C), Temperature Source: Temp Source: Oral 
Recent Labs  
  09/29/20 2027 BUN 29* CREA 1.07*  WBC 20.3* Allergies: Advair diskus [fluticasone propion-salmeterol]; Aspartame; Breo ellipta [fluticasone furoate-vilanterol]; Bumex [bumetanide]; Ciprofloxacin; Statins-hmg-coa reductase inhibitors; Sulfa (sulfonamide antibiotics); Tetracycline; Torsemide; and Zetia [ezetimibe] Education materials for Midline Care given: yes. See Patient Education activity for further details. Midline Booklet placed at bedside: yes Midline Catheter Maintenance: For complete information reference Policy # [id number] A. Dressing Changes 1. Assess the dressing in the first 24 hours for accumulation of blood, fluid or moisture beneath the dressing. During dressing changes, assess the external length of the catheter to determine if migration of the catheter has occurred. Periodically confirm catheter placement, tip location, patency and security of dressing. Dressing changes should be done every 7 days, and PRN using sterile technique if integrity of dressing is compromised. Apply new dressing per hospital policy. Caution: Do not use scissors to remove dressing to minimize the risk of cutting the  Catheter. B. Flushing 1. Flush each lumen of the catheter with 10 mL of sterile saline every 12 hours or after each use. In addition, lock each lumen of the catheter with sterile saline. Note: Flush with 20 mL of sterile saline after blood therapy Caution: DO NOT USE A SYRINGE SMALLER THAN 10 mL TO FLUSH AND CONFIRM PATENCY. Patency should be assessed with a 10 mL or larger syringe and  sterile saline. Upon confirmation of patency, administration of medication should be  given in a syringe appropriately sized for the dose. Do not infuse against resistance. C. Blood Draws: 1. Stop infusion, draw off and waste 10 ml of blood. Draw sample with 10 mL syringe or          greater. DO NOT USE VACUTAINER . Transfer with appropriate device to lab tubes. Flush        with 20 ml NS. 
D. Occluded or Partially Occluded Catheter 1. Catheters that present resistance to flushing and aspiration may be partially or completely  occluded. Do not flush against resistance. PROCEDURE DETAIL: 
 
Verbal consent was obtained and all questions were answered related to risks and benefits. A Midline was inserted as a sterile procedure using ultrasound and Modified Seldinger Technique for vascular access. The following documentation is in addition to the Midline properties in the lines/airways Doc Flow Sheet: Lot #: [id number] Lidocaine 1% administered intradermally :yes Internal Catheter Total Length: 11 (cm)   External catheter length: 1(cm) 
Vein Selection for Midline:left cephalic Sterile CVC Insertion Bundle was used to place line yes Complication Related to Insertion:no Prior to use, line patency MUST be verified by flushing at least a 10 mL syringe. Line should flush easily without resistance, and withdrawal of brisk blood return to verify patency. Line is okay to use: yes        (Remove Midline by:    October 29, 2020         ) Lennox Rosser, RN

## 2020-09-30 NOTE — PROGRESS NOTES
0700: Bedside shift change report given to [address] (oncoming nurse) by Jony Fulotn (offgoing nurse). Report included the following information SBAR, Kardex, Intake/Output, MAR, Accordion and Cardiac Rhythm Sinus tach. 8186: PICC team called for midline placement. States they will be here within the hour. 1255: Dr. Sukhjinder Pulido notified of patients lack of urine output. Bladder scan showed 184 mL. Lasix just administer. Ordered to continue to monitor. 1302: Pt temp increased to 99.2 one hour into blood transfusion. Notified Dr. Sukhjinder Pulido [facility] to continue with infusion. 1639: Blood sugar reading of 52 at 1629, 8 ounces of orange juice given at 1637. Patient's repeat blood sugar at 1652 is 32. Rechecked to be 51. Dr. Sukhjinder Pulido notified and ordered more orange juice. At 1714, blood sugar was 39. Rechecked at [phone] on other hand which was 131. Rechecked blood sugar at 1717 was 114. Patient was give peanut butter and crackers at this time. At [address] [facility], Dr. Sukhjinder Pulido was notified of varying blood glucose readings. The patient's fingertips appear blue in color, but they are warm to touch. Dr. Sukhjinder Pulido ordered stat BMP.

## 2020-09-30 NOTE — H&P
[facility] 81 HISTORY AND PHYSICAL Name:  Deion Fung 
MR#:  [id number] :  1945 ACCOUNT #:  [de-identified] DATE OF SERVICE:  2020 PRIMARY CARE PHYSICIAN:  Rhonda Xiao MD 
 
CHIEF COMPLAINT:  Dizziness, fall and low back pain. HISTORY OF PRESENT ILLNESS:  81-year-old female with multiple medical problems including obesity, sleep apnea, non-ST-elevation myocardial infarction, primary hypertension, congestive heart failure, gastroesophageal reflux disease, type 2 diabetes mellitus, temporal arteritis, fibromyalgia, atrial fibrillation and asthma, was brought to the emergency room from home for complaints of lightheadedness, dizziness with subsequent fall and low back pain. Patient stated that whilst preparing some dinner in the evening hours of 2020, had the onset of symptoms with subsequent fall in a sitting position. Thereafter, patient noted persistent low back pain. Patient denied any recent changes in medication. Denied headaches, visual deficits, chest pain, palpitations, sore throat, cough, shortness of breath, nausea, vomiting, fevers, chills, abdominal pain, bladder or bowel irregularities. PAST MEDICAL HISTORY: 
1.  Obesity. 2.  Primary hypertension. 3.  Atrial fibrillation. 4.  Coronary artery disease. 5.  Non-ST-elevation myocardial infarction. 6.  C7 vertebral compression fracture. 7.  Type 2 diabetes mellitus. 8.  Gastroesophageal reflux disease. 9.  Sleep apnea, with CPAP therapy. 10. Temporal Arteritis. PAST SURGICAL HISTORY: 
1. Breast lumpectomy. 2.  Hiatal hernia repair. 3.  Left heart catheterization with stents. 4.   section. 5.  Colostomy reversal. 
6.  Hysterectomy. 7.  Bladder sling surgery. HOME MEDICATIONS: 
1. Tylenol 325 mg 1 tablet p.o. every 4 hours as needed for pain. 2.  Albuterol inhaler 2 puffs every 4 hours as needed for wheezing or shortness of breath. 3.  Albuterol nebulizer every 6 hours as needed. 4.  Eliquis 5 mg 1 tablet p.o. twice a day. 5.  Aspirin delayed release 81 mg p.o. daily. 6.  Dymista 2 sprays to each nostril daily as needed. 7.  Biotin 10,000 mcg 1 capsule p.o. daily. 8.  Cholecalciferol 2000 units p.o. daily. 9.  Cymbalta 60 mg p.o. daily. 10.  Boniva 150 mg p.o. every 30 days. 11.  Lactobacillus 1 capsule p.o. daily. 12.  Magnesium oxide 400 mg p.o. nightly. 13.  Lopressor 25 mg p.o. daily as needed. 14.  Remeron 15 mg p.o. nightly. 15.  Dulera 2 puffs inhaler twice daily as needed. 16.  Singulair 10 mg p.o. daily. 17.  Omeprazole delayed release 20 mg p.o. twice a day. 18.  Potassium chloride slow release 10 mEq p.o. twice a day. 19.  Prednisone 10 mg p.o. twice a day. 20.  Promethazine 25 mg p.o. every 8 hours as needed for nausea. 21.  Onglyza 5 mg p.o. daily. 22.  Spiriva 2 puffs inhaler daily as needed. 23.  Tramadol 50 mg p.o. every 6 hours as needed. ALLERGIES:  MULTIPLE INCLUDING TO ADVAIR DISKUS, ASPARTAME, BREO ELLIPTA, BUMEX, CIPROFLOXACIN, SULFA DRUGS, TETRACYCLINE, TORSEMIDE, AND ZETIA. SOCIAL HISTORY:  Significant for living at home. Mobilizes with the aid of a rollator. Is a former smoker. Prior alcohol use disorder. No recent travels or known sick contacts. FAMILY HISTORY:  Reviewed and positive for diabetes, heart disease and dyslipidemia on both the maternal and paternal sides of the family. REVIEW OF SYSTEMS:  A complete system review conducted essentially negative, otherwise as outlined in the history of the presenting illness. PHYSICAL EXAMINATION: 
GENERAL:  No acute distress. VITAL SIGNS:  Blood pressure of 127/58, heart rate 104, respiratory rate 21, afebrile, saturating 100% on 3 liters nasal cannula. HEENT:  Head exam, normocephalic. Pupils equal and reactive to light without any nystagmus. ENT exam, ear, nose, throat clear. NECK:  No jugular venous distention or carotid bruits. CARDIOVASCULAR:  S1, S2 tachycardic without any murmurs. RESPIRATORY:  Lungs with decreased air entry at both bases without any crepitations or rhonchi. GI:  Abdomen is obese. Bowel sounds present. The abdomen is soft, nontender. No rebound, no guarding. GENITOURINARY:  No suprapubic or flank tenderness. MUSCULOSKELETAL:  No asymmetry of the extremities. Tenderness lower back. CNS:  Awake and alert, oriented to time, date, place, person. LABORATORY DATA/RADIOGRAPHIC FINDINGS:  
 
 CT head without contrast without any acute intracranial findings. CT cervical spine with moderate spondylosis and disc degeneration C5-C6. No acute fracture or dislocation. CT lumbar spine with nondisplaced acute fracture L3 superior endplate. CT pelvis without contrast without any acute fractures. Metabolic panel with sodium of 137, potassium 4.3, chloride 103, bicarb 30, BUN 29, creatinine 1.07, glucose 190, calcium 7.7. First troponin less than 0.05, proBNP of 1562. CBC with a WBC of 20.3, 89% polys, 2% bands, hemoglobin 5.1, hematocrit 19.6, platelet count 538, MCV 79. ASSESSMENT AND PLAN: 
1. Cardiovascular:  Acute syncope and collapse. Will need to rule out dysrhythmias. History of coronary artery disease. Chronic congestive heart failure without any definite acute exacerbation. Primary hypertension. Dyslipidemia. Chronic Atrial fibrillation. No acute ischemia noted. Orthostatic readings, 2-D echocardiogram, Cardiology consult. 2.  Anticoagulation:  Will continue Eliquis for history of chronic atrial fibrillation. 3.  Hematology:  Transfusion of packed red blood cells for acute-on-chronic anemia. Iron profile, ferritin and B12 level checks. 4.  Renal:  Dehydration. For followup kidney function. 5.  Musculoskeletal:  Osteoporosis, osteoarthritis. Physical debility with fall. L3 superior endplate fracture. Orthopedic consult. 6.  Endocrine:  Type 2 diabetes mellitus. A1c check. Accu-Chek monitoring. Sliding scale insulin. 7.  Respiratory:  Sleep apnea, with nightly CPAP therapy. 8.  Prophylaxis for deep vein thrombosis. 9.  Directives:  Full code with a very guarded prognosis. Discussed with the patient. In summary, a 70-year-old female with an extensive past medical history including coronary artery disease; congestive heart failure; non-ST-elevation myocardial infarction; atrial fibrillation, on Eliquis; primary hypertension; type 2 diabetes mellitus and sleep apnea, with CPAP therapy; is being admitted to the inpatient level for syncope and collapse, acute-on-chronic anemia and L3 vertebral compression fracture; necessitating ongoing close monitoring and management including with anemia evaluation, transfusion of packed red blood cells, orthostatic readings, 2-D echocardiogram, monitoring for dysrhythmias, Physical and Occupational Therapy evaluations, Cardiology and Orthopedic surgical consults. The entire admission plan discussed in detail with the patient. All questions and concerns were addressed. Patient's functional status prior to admission significant for patient being able to mobilize with the aid of a rollator. Total time for admission 55 minutes, of which at least 50% of the time was spent in plan of care and counseling of  patient. Cesar Jacobs MD 
 
 
[id number] 
D:  09/30/2020 0:05 
T:  09/30/2020 0:55 JOB #:  [id number]

## 2020-09-30 NOTE — CONSULTS
Patient: Andria Pierson : 1945 Today's Date: 2020 HISTORY OF PRESENT ILLNESS:  
 
History of Present Illness: 
 
Andria Pierson is a 69 y.o. female with a history of CAD s/p PCI with stent in 2018, HFpEF, HTN, COPD on 3L NC, PAD s/p left fem pop, recurrent DVT on AC, Afib, Type II DM, and HAYES presenting with lightheadedness for the last few days. She was walking to her kitchen yesterday evening when she felt lightheaded and fell on her bottom when trying to sit in her rolator. Hit her head but denies LOC. Reports increased dyspnea on exertion and at rest. Bilateral leg swelling is at baseline. Has palpitations but states this is chronic. Took Torsemide 20mg yesterday but states it gave her a rash. Hgb 5.1 on admission s/p 2 units pRBCs. FOBT neg and patient denies bloody stools. BNP 1562. PAST MEDICAL HISTORY:  
 
Past Medical History:  
Diagnosis Date  Asthma  Atrial fibrillation ([address] 2018  Autoimmune disease ([address]   
 fibromyalgia  CAD (coronary artery disease) 10/9/2015  Closed wedge compression fracture of T7 vertebra ([address] 2018  Diabetes ([address]  DVT (deep vein thrombosis) in pregnancy  GERD (gastroesophageal reflux disease)  Heart failure ([address]  History of vascular access device 2020  
 4f midline, 12cm at 1cm out placed in L Cephalic by Gilson Corona, RN  
 HTN (hypertension)  NSTEMI (non-ST elevated myocardial infarction) ([address] 10/9/2015  Obesity (BMI 30-39.9) 2018  Sleep apnea   
 wears CPAP Past Surgical History:  
Procedure Laterality Date  ABDOMEN SURGERY PROC UNLISTED    
 hital hernia repair, gall bladder removed  AMPUTATION TRANSMETACARPAL Right 2019  
 entire ring finger, 2nd & 3rd fingers (transmetacarpal)  BREAST SURGERY PROCEDURE UNLISTED    
 lumpectomy  CARDIAC SURG PROCEDURE UNLIST  10/9/15  
 2 stents [place]  HX COLOSTOMY and reversal, post episiotomy repair [address]  HX UROLOGICAL  2009  
 bladder sling MEDICATIONS:  
 
Current Facility-Administered Medications Medication Dose Route Frequency Provider Last Rate Last Dose  influenza vaccine 2020-21 (6 mos+)(PF) (FLUARIX/FLULAVAL/FLUZONE QUAD) injection 0.5 mL  0.5 mL IntraMUSCular PRIOR TO DISCHARGE Rehana Chen MD      
 metoprolol tartrate (LOPRESSOR) tablet 25 mg  25 mg Oral DAILY PRN Kinza Sanchez MD      
 mirtazapine (REMERON) tablet 15 mg  15 mg Oral QHS Kinza Sanchez MD      
 DULoxetine (CYMBALTA) capsule 60 mg  60 mg Oral DAILY Kinza Sanchez MD   60 mg at 09/30/20 1014  
 0.9% sodium chloride infusion 250 mL  250 mL IntraVENous PRN Kinza Sanchez MD      
 montelukast (SINGULAIR) tablet 10 mg  10 mg Oral DAILY Kinza Sanchez MD   10 mg at 09/30/20 1014  potassium chloride SR (KLOR-CON 10) tablet 10 mEq  10 mEq Oral BID Darryle Brier, MD   10 mEq at 09/30/20 1014  pantoprazole (PROTONIX) 40 mg in 0.9% sodium chloride 10 mL injection  40 mg IntraVENous Q12H Christine Sanchez MD   40 mg at 09/30/20 1014  
 HYDROcodone-acetaminophen (NORCO) 5-325 mg per tablet 1 Tab  1 Tab Oral Q4H PRN Darryle Brier, MD   1 Tab at 09/30/20 1014  furosemide (LASIX) tablet 40 mg  40 mg Oral DAILY Alvian Davis MD      
 0.9% sodium chloride infusion 250 mL  250 mL IntraVENous PRN Peggy Hedrick MD      
 sodium chloride (NS) flush 5-40 mL  5-40 mL IntraVENous Q8H Vincent JIMÉNEZ MD   10 mL at 09/30/20 4952  sodium chloride (NS) flush 5-40 mL  5-40 mL IntraVENous PRN Vincent Smith MD      
 acetaminophen (TYLENOL) tablet 650 mg  650 mg Oral Q6H PRN Vincent Smith MD      
 Or  
 acetaminophen (TYLENOL) suppository 650 mg  650 mg Rectal Q6H PRN Vincent Smith MD      
 polyethylene glycol (MIRALAX) packet 17 g  17 g Oral DAILY PRN Lovely Turner, Naveed Ruvalcaba MD      
 promethazine (PHENERGAN) tablet 12.5 mg  12.5 mg Oral Q6H PRN Musa Steven MD      
 Or  
 ondansetron [facility]) injection 4 mg  4 mg IntraVENous Q6H PRN Musa Steven MD      
 glucose chewable tablet 16 g  4 Tab Oral PRN Musa Steven MD      
 dextrose (D50W) injection syrg 12.5-25 g  25-50 mL IntraVENous PRN Musa Steven MD      
 glucagon [facility]) injection 1 mg  1 mg IntraMUSCular PRN Musa Steven MD      
 insulin lispro (HUMALOG) injection   SubCUTAneous AC&HS Musa Steven MD   Stopped at 09/30/20 0730  
 0.9% sodium chloride infusion 250 mL  250 mL IntraVENous PRN Musa Steven MD      
 
 
Allergies Allergen Reactions  Advair Diskus [Fluticasone Propion-Salmeterol] Rash  Aspartame Other (comments)  Breo Ellipta [Fluticasone Furoate-Vilanterol] Rash  Bumex [Bumetanide] Myalgia  Ciprofloxacin Rash  Statins-Hmg-Coa Reductase Inhibitors Other (comments) Muscle pain Lipitor/crestor/zocor  Sulfa (Sulfonamide Antibiotics) Rash  Tetracycline Other (comments) musclepain  Torsemide Rash and Myalgia  Zetia [Ezetimibe] Myalgia SOCIAL HISTORY:  
 
Social History Tobacco Use  Smoking status: Former Smoker Last attempt to quit: 3/30/2017 Years since quitting: 3.5  Smokeless tobacco: Never Used Substance Use Topics  Alcohol use: No  
  Alcohol/week: 0.0 standard drinks Comment: very very rarely  Drug use: No  
 
 
 
FAMILY HISTORY:  
 
Family History Problem Relation Age of Onset  Diabetes Mother  Heart Failure Mother  Kidney Disease Mother  Diabetes Father  Heart Disease Father  Elevated Lipids Father  Heart Failure Father  Heart Disease Brother  Cancer Brother   
     kidney  Diabetes Brother  No Known Problems Brother  No Known Problems Brother REVIEW OF SYMPTOMS:  
 
Review of Symptoms: Constitutional: Negative for fever, chills HEENT: Negative for nosebleeds, tinnitus, and vision changes. Respiratory: Negative for cough, wheezing Cardiovascular: Negative for orthopnea, claudication, syncope, and PND. Gastrointestinal: Negative for abdominal pain, diarrhea, melena. Genitourinary: Negative for dysuria Musculoskeletal: Negative for myalgias. Skin: Negative for rash Heme: No problems bleeding. Neurological: Negative for speech change and focal weakness. PHYSICAL EXAM:  
 
Physical Exam: 
Visit Vitals BP (!) 115/55 (BP 1 Location: Right arm, BP Patient Position: At rest) Pulse (!) 106 Temp 97.5 °F (36.4 °C) Resp 17 Wt 254 lb 12.8 oz (115.6 kg) SpO2 100% BMI 39.91 kg/m² Patient appears generally well, mood and affect are appropriate and pleasant. Skin pale. HEENT:  Hearing intact, non-icteric, normocephalic, atraumatic. Neck Exam: Supple, No JVD or carotid bruits. Lung Exam: Clear to auscultation, even breath sounds. Cardiac Exam: Regular rate and rhythm with no murmur Abdomen: Soft, non-tender, normal bowel sounds. No bruits or masses. Extremities: Moves all ext well. 3+ pitting LE edema bilaterally. Vascular: 2+ dorsalis pedis pulses bilaterally. Psych: Appropriate affect Neuro - Grossly intact LABS / OTHER STUDIES:  
 
Recent Results (from the past 24 hour(s)) EKG, 12 LEAD, INITIAL Collection Time: 09/29/20  8:16 PM  
Result Value Ref Range Ventricular Rate 99 BPM  
 Atrial Rate 99 BPM  
 P-R Interval 140 ms QRS Duration 84 ms Q-T Interval 340 ms QTC Calculation (Bezet) 436 ms Calculated P Axis 65 degrees Calculated R Axis -32 degrees Calculated T Axis 34 degrees Diagnosis Sinus rhythm with marked sinus arrhythmia Left axis deviation Low voltage QRS Abnormal ECG When compared with ECG of 29-MAY-2020 14:07, Minimal criteria for Anterior infarct are no longer present T wave inversion no longer evident in Anterior leads CBC WITH AUTOMATED DIFF Collection Time: 09/29/20  8:27 PM  
Result Value Ref Range WBC 20.3 (H) 3.6 - 11.0 K/uL  
 RBC 2.48 (L) 3.80 - 5.20 M/uL HGB 5.1 (LL) 11.5 - 16.0 g/dL HCT 19.6 (L) 35.0 - 47.0 % MCV 79.0 (L) 80.0 - 99.0 FL  
 MCH 20.6 (L) 26.0 - 34.0 PG  
 MCHC 26.0 (L) 30.0 - 36.5 g/dL  
 RDW 18.3 (H) 11.5 - 14.5 % PLATELET 966 889 - 134 K/uL MPV 10.2 8.9 - 12.9 FL  
 NRBC 0.4 (H) 0  WBC ABSOLUTE NRBC 0.09 (H) 0.00 - 0.01 K/uL NEUTROPHILS 89 (H) 32 - 75 % BAND NEUTROPHILS 2 0 - 6 % LYMPHOCYTES 3 (L) 12 - 49 % MONOCYTES 6 5 - 13 % EOSINOPHILS 0 0 - 7 % BASOPHILS 0 0 - 1 % IMMATURE GRANULOCYTES 0 %  
 ABS. NEUTROPHILS 18.5 (H) 1.8 - 8.0 K/UL  
 ABS. LYMPHOCYTES 0.6 (L) 0.8 - 3.5 K/UL  
 ABS. MONOCYTES 1.2 (H) 0.0 - 1.0 K/UL  
 ABS. EOSINOPHILS 0.0 0.0 - 0.4 K/UL  
 ABS. BASOPHILS 0.0 0.0 - 0.1 K/UL  
 ABS. IMM. GRANS. 0.0 K/UL  
 DF MANUAL    
 RBC COMMENTS ANISOCYTOSIS 1+ 
    
 RBC COMMENTS MICROCYTOSIS 1+ 
    
 RBC COMMENTS HYPOCHROMIA 2+ 
    
 RBC COMMENTS POLYCHROMASIA PRESENT 
    
 RBC COMMENTS OVALOCYTES PRESENT 
    
METABOLIC PANEL, BASIC Collection Time: 09/29/20  8:27 PM  
Result Value Ref Range Sodium 137 136 - 145 mmol/L Potassium 4.3 3.5 - 5.1 mmol/L Chloride 103 97 - 108 mmol/L  
 CO2 30 21 - 32 mmol/L Anion gap 4 (L) 5 - 15 mmol/L Glucose 190 (H) 65 - 100 mg/dL BUN 29 (H) 6 - 20 MG/DL Creatinine 1.07 (H) 0.55 - 1.02 MG/DL  
 BUN/Creatinine ratio 27 (H) 12 - 20 GFR est AA >60 >60 ml/min/1.73m2 GFR est non-AA 50 (L) >60 ml/min/1.73m2 Calcium 7.7 (L) 8.5 - 10.1 MG/DL MAGNESIUM Collection Time: 09/29/20  8:27 PM  
Result Value Ref Range Magnesium 2.2 1.6 - 2.4 mg/dL NT-PRO BNP Collection Time: 09/29/20  8:27 PM  
Result Value Ref Range NT pro-BNP 1,562 (H) <125 PG/ML  
TROPONIN I  Collection Time: 09/29/20  8:27 PM  
 Result Value Ref Range Troponin-I, Qt. <0.05 <0.05 ng/mL SAMPLES BEING HELD Collection Time: 09/29/20  8:27 PM  
Result Value Ref Range SAMPLES BEING HELD SST. CHELY   
 COMMENT Add-on orders for these samples will be processed based on acceptable specimen integrity and analyte stability, which may vary by analyte. FERRITIN Collection Time: 09/29/20  8:27 PM  
Result Value Ref Range Ferritin 22 (L) 26 - 388 NG/ML  
IRON PROFILE Collection Time: 09/29/20  8:27 PM  
Result Value Ref Range Iron 16 (L) 35 - 150 ug/dL TIBC 305 250 - 450 ug/dL Iron % saturation 5 (L) 20 - 50 % VITAMIN B12 Collection Time: 09/29/20  8:27 PM  
Result Value Ref Range Vitamin B12 159 (L) 193 - 986 pg/mL HEMOGLOBIN A1C WITH EAG Collection Time: 09/29/20  8:27 PM  
Result Value Ref Range Hemoglobin A1c 7.6 (H) 4.0 - 5.6 % Est. average glucose 171 mg/dL  
RBC, ALLOCATE Collection Time: 09/29/20  9:00 PM  
Result Value Ref Range HISTORY CHECKED? Historical check performed TYPE & SCREEN Collection Time: 09/29/20  9:31 PM  
Result Value Ref Range Crossmatch Expiration 10/02/2020 ABO/Rh(D) A POSITIVE Antibody screen NEG Unit number [id number] Blood component type  LR Unit division 00 Status of unit TRANSFUSED Crossmatch result Compatible Unit number [id number] Blood component type  LR Unit division 00 Status of unit REL FROM [facility] Crossmatch result Compatible Unit number [id number] Blood component type  LRIR Unit division 00 Status of unit ALLOCATED Crossmatch result Compatible OCCULT BLOOD, STOOL Collection Time: 09/29/20 10:14 PM  
Result Value Ref Range Occult blood, stool Negative NEG    
RBC, ALLOCATE Collection Time: 09/29/20 11:30 PM  
Result Value Ref Range HISTORY CHECKED? Historical check performed RBC, ALLOCATE  Collection Time: 09/30/20  7:45 AM  
 Result Value Ref Range HISTORY CHECKED? Historical check performed GLUCOSE, POC Collection Time: 09/30/20  8:12 AM  
Result Value Ref Range Glucose (POC) 126 (H) 65 - 100 mg/dL Performed by Angel Vargas CBC WITH AUTOMATED DIFF Collection Time: 09/30/20 10:19 AM  
Result Value Ref Range WBC 17.7 (H) 3.6 - 11.0 K/uL  
 RBC 2.67 (L) 3.80 - 5.20 M/uL HGB 6.1 (L) 11.5 - 16.0 g/dL HCT 21.7 (L) 35.0 - 47.0 % MCV 81.3 80.0 - 99.0 FL  
 MCH 22.8 (L) 26.0 - 34.0 PG  
 MCHC 28.1 (L) 30.0 - 36.5 g/dL  
 RDW 17.3 (H) 11.5 - 14.5 % PLATELET 869 255 - 013 K/uL MPV 10.1 8.9 - 12.9 FL  
 NRBC 1.4 (H) 0  WBC ABSOLUTE NRBC 0.24 (H) 0.00 - 0.01 K/uL NEUTROPHILS PENDING % LYMPHOCYTES PENDING % MONOCYTES PENDING % EOSINOPHILS PENDING % BASOPHILS PENDING % IMMATURE GRANULOCYTES PENDING %  
 ABS. NEUTROPHILS PENDING K/UL  
 ABS. LYMPHOCYTES PENDING K/UL  
 ABS. MONOCYTES PENDING K/UL  
 ABS. EOSINOPHILS PENDING K/UL  
 ABS. BASOPHILS PENDING K/UL  
 ABS. IMM. GRANS. PENDING K/UL  
 DF PENDING   
 
 
 
CARDIAC DIAGNOSTICS:  
 
Cardiac Evaluation Includes: 
EKG sinus rhythm with few PACs. No ST/T wave changes. QTc 436 CAD s/p PCI to RCA with BMS ( NSTEMI 10/2015); 6/6/18- LAD stent/D1 PTCA ( W) Echo 7/2019: EF 61-65%, mildly increased wall thickness PAD-9/2019 Left fem-pop bypass with PTFE; followed by Dr. Dannie Melchor ASSESSMENT AND PLAN:  
 
Assessment and Plan: 
 
Syncopal episode: Due to severe anemia without clear source of blood loss. S/p 2 units pRBCs. Hold Eliquis/ASA. Transfuse as needed. Acute HFpEF: BNP 1562. Obtain CXR and echo. Intolerant of torsemide/bumetanide. PO Lasix 40mg today then 20mg daily. Resident: Cony Leung MD, Family Medicine Resident Discussed and seen with Dr. Mook Burks. Pt personally seen and examined. Chart reviewed. Agree with advanced NP's history, exam and  A/P with changes/additons. Neck - No JVD RS - Dec AE bilat; No rales/rhonchi CVS - S1 S2 RRR; 2/6 sys mumur+ Abd - Soft/BS+ 
LE -Edema+ A/P - Sycnope/Dizziness sec to severe anemia - S/p PRBC transfusion - keep Hb >8 Acute on chronic  HFpEF - Pt states she has rash/myalgia with bumex/toresemide. Lasix PO  
CAD - stable PAF - Eliquis/ASA held sec to severe anemia Discussed with patient/nursing Reid Serrano. MD, [facility] [address] [place] [address] [address] [place] [address], [address]  [place] [address] [address] [place] [address]  [address] Ph: [phone]   Ph [phone]

## 2020-09-30 NOTE — CONSULTS
ORTHOPEDIC SURGERY CONSULT Subjective:  
 
Date of Consultation:  September 30, 2020 Referring Physician:  Dr. Herb Juarez Bibi Quispe is a 69 y.o. female who is being seen for L3 compression fracture. She is well known to orthopedics as we have managed her previous compression fractures. She has a significant past medical history of PVD, obesity, COPD, UTI, PAD, anemia, DM-2 with neuropathy, DVT, and CAD. She presented to the hospital last night for debility after a ground level fall. Per the patient she fell while moving her rollator and landed on her buttocks. Since that time she has had severe lumbar pain. CT in the ER showed an acute L3 compression fracture with minimal compression and no moe retropulsion. She complains of severe lumbar pain that is worse than her previous compression fractures. She denies bowel or bladder dysfunction. She denies worsening radiculopathy or numbness. Patient Active Problem List  
 Diagnosis Date Noted  Syncope and collapse 09/29/2020  Cellulitis of lower extremity 03/23/2020  ASO (arteriosclerosis obliterans) 09/05/2019  PVD (peripheral vascular disease) ([address] 08/01/2019  Severe obesity ([address] 07/30/2019  UTI (urinary tract infection) 07/13/2019  COPD (chronic obstructive pulmonary disease) ([address] 07/13/2019  Sepsis ([address] 07/13/2019  Normocytic anemia 07/13/2019  PAD (peripheral artery disease) ([address] 07/13/2019  Mild intermittent asthma 05/17/2019  Gangrene of finger of right hand ([address] 05/17/2019  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/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  Sleep apnea 01/05/2019  Atrial fibrillation ([address] 11/16/2018  Obesity (BMI 30-39.9) 11/13/2018  Type 2 diabetes with nephropathy ([address] 10/01/2018  Recurrent deep vein thrombosis (DVT) ([address] 03/30/2018  Chronic anticoagulation 03/30/2018  Coronary artery disease involving native coronary artery without angina pectoris 01/22/2016  Essential hypertension 01/22/2016  CAD (coronary artery disease) 10/09/2015  Type II diabetes mellitus ([facility] 10/09/2015 Family History Problem Relation Age of Onset  Diabetes Mother  Heart Failure Mother  Kidney Disease Mother  Diabetes Father  Heart Disease Father  Elevated Lipids Father  Heart Failure Father  Heart Disease Brother  Cancer Brother   
     kidney  Diabetes Brother  No Known Problems Brother  No Known Problems Brother Social History Tobacco Use  Smoking status: Former Smoker Last attempt to quit: 3/30/2017 Years since quitting: 3.5  Smokeless tobacco: Never Used Substance Use Topics  Alcohol use: No  
  Alcohol/week: 0.0 standard drinks Comment: very very rarely Past Medical History:  
Diagnosis Date  Asthma  Atrial fibrillation ([facility] 11/16/2018  Autoimmune disease ([address]   
 fibromyalgia  CAD (coronary artery disease) 10/9/2015  Closed wedge compression fracture of T7 vertebra ([address] 11/13/2018  Diabetes ([facility]  DVT (deep vein thrombosis) in pregnancy  GERD (gastroesophageal reflux disease)  Heart failure ([address]  History of vascular access device 09/30/2020  
 4f midline, 12cm at 1cm out placed in L Cephalic by Ni Vieira RN  
 HTN (hypertension)  NSTEMI (non-ST elevated myocardial infarction) ([address] 10/9/2015  Obesity (BMI 30-39.9) 11/13/2018  Sleep apnea   
 wears CPAP Past Surgical History:  
Procedure Laterality Date  ABDOMEN SURGERY PROC UNLISTED    
 hital hernia repair, gall bladder removed  AMPUTATION TRANSMETACARPAL Right 06/12/2019  
 entire ring finger, 2nd & 3rd fingers (transmetacarpal)  BREAST SURGERY PROCEDURE UNLISTED    
 lumpectomy  CARDIAC SURG PROCEDURE UNLIST  10/9/15  
 2 stents [facility]  HX COLOSTOMY    
 and reversal, post episiotomy repair [address]  HX UROLOGICAL  2009 bladder sling Prior to Admission medications Medication Sig Start Date End Date Taking? Authorizing Provider  
tiotropium bromide (Spiriva Respimat) 2.5 mcg/actuation inhaler Take 2 Puffs by inhalation daily as needed. Yes Provider, Historical  
montelukast (Singulair) 10 mg tablet Take 10 mg by mouth daily. Yes Provider, Historical  
sAXagliptin (ONGLYZA) 5 mg tab tablet Take 5 mg by mouth daily. Yes Provider, Historical  
traMADoL (ULTRAM) 50 mg tablet Take 50 mg by mouth every six (6) hours as needed for Pain. Yes Provider, Historical  
metoprolol tartrate (LOPRESSOR) 25 mg tablet Take 25 mg by mouth daily as needed (For systolic >154). Yes Provider, Historical  
acetaminophen (TYLENOL) 325 mg tablet Take 1 Tab by mouth every four (4) hours as needed for Pain. 3/31/20  Yes Donta Valerio MD  
magnesium oxide (MAG-OX) 400 mg tablet Take 400 mg by mouth nightly. Yes Provider, Historical  
potassium chloride SR (KLOR-CON 10) 10 mEq tablet Take 10 mEq by mouth two (2) times a day. Yes Provider, Historical  
predniSONE (DELTASONE) 10 mg tablet Take 10 mg by mouth two (2) times daily (with meals). Yes Provider, Historical  
Omeprazole delayed release (PRILOSEC D/R) 20 mg tablet Take 20 mg by mouth two (2) times a day. Yes Provider, Historical  
ibandronate (BONIVA) 150 mg tablet Take 150 mg by mouth every thirty (30) days. Yes Provider, Historical  
promethazine (PHENERGAN) 25 mg tablet Take 25 mg by mouth every eight (8) hours as needed for Nausea (Nausea not relieved by ondansetron). Yes Provider, Historical  
mirtazapine (REMERON) 15 mg tablet Take 15 mg by mouth nightly. Yes Provider, Historical  
albuterol (PROVENTIL VENTOLIN) 2.5 mg /3 mL (0.083 %) nebulizer solution 2.5 mg by Nebulization route every six (6) hours as needed for Wheezing or Shortness of Breath.    Yes Provider, Historical  
albuterol (PROAIR HFA) 90 mcg/actuation inhaler Take 2 Puffs by inhalation every four (4) hours as needed. Yes Provider, Historical  
lactobacillus rhamnosus gg 10 billion cell (CULTURELLE) 10 billion cell capsule Take 1 Cap by mouth daily. Yes Provider, Historical  
biotin 10,000 mcg cap Take 1 Cap by mouth daily. Yes Provider, Historical  
DULoxetine (CYMBALTA) 60 mg capsule Take 60 mg by mouth daily. Yes Provider, Historical  
cholecalciferol, vitamin D3, (Vitamin D3) 50 mcg (2,000 unit) tab Take 2,000 Units by mouth daily. Yes Provider, Historical  
azelastine-fluticasone (DYMISTA) 137-50 mcg/spray spry 2 Sprays by Nasal route daily as needed (allergies). Yes Provider, Historical  
mometasone-formoterol (DULERA) 200-5 mcg/actuation HFA inhaler Take 2 Puffs by inhalation two (2) times daily as needed. Yes Provider, Historical  
 
Current Facility-Administered Medications Medication Dose Route Frequency  influenza vaccine 2020-21 (6 mos+)(PF) (FLUARIX/FLULAVAL/FLUZONE QUAD) injection 0.5 mL  0.5 mL IntraMUSCular PRIOR TO DISCHARGE  metoprolol tartrate (LOPRESSOR) tablet 25 mg  25 mg Oral DAILY PRN  
 mirtazapine (REMERON) tablet 15 mg  15 mg Oral QHS  DULoxetine (CYMBALTA) capsule 60 mg  60 mg Oral DAILY  0.9% sodium chloride infusion 250 mL  250 mL IntraVENous PRN  
 montelukast (SINGULAIR) tablet 10 mg  10 mg Oral DAILY  potassium chloride SR (KLOR-CON 10) tablet 10 mEq  10 mEq Oral BID  pantoprazole (PROTONIX) 40 mg in 0.9% sodium chloride 10 mL injection  40 mg IntraVENous Q12H  
 HYDROcodone-acetaminophen (NORCO) 5-325 mg per tablet 1 Tab  1 Tab Oral Q4H PRN  
 furosemide (LASIX) tablet 40 mg  40 mg Oral ONCE  
 [START ON 10/1/2020] furosemide (LASIX) tablet 20 mg  20 mg Oral DAILY  0.9% sodium chloride infusion 250 mL  250 mL IntraVENous PRN  
 sodium chloride (NS) flush 5-40 mL  5-40 mL IntraVENous Q8H  
 sodium chloride (NS) flush 5-40 mL  5-40 mL IntraVENous PRN  
 acetaminophen (TYLENOL) tablet 650 mg  650 mg Oral Q6H PRN  Or  
  acetaminophen (TYLENOL) suppository 650 mg  650 mg Rectal Q6H PRN  polyethylene glycol (MIRALAX) packet 17 g  17 g Oral DAILY PRN  promethazine (PHENERGAN) tablet 12.5 mg  12.5 mg Oral Q6H PRN Or  
 ondansetron (ZOFRAN) injection 4 mg  4 mg IntraVENous Q6H PRN  
 glucose chewable tablet 16 g  4 Tab Oral PRN  
 dextrose (D50W) injection syrg 12.5-25 g  25-50 mL IntraVENous PRN  
 glucagon (GLUCAGEN) injection 1 mg  1 mg IntraMUSCular PRN  
 insulin lispro (HUMALOG) injection   SubCUTAneous AC&HS  
 0.9% sodium chloride infusion 250 mL  250 mL IntraVENous PRN Allergies Allergen Reactions  Advair Diskus [Fluticasone Propion-Salmeterol] Rash  Aspartame Other (comments)  Breo Ellipta [Fluticasone Furoate-Vilanterol] Rash  Bumex [Bumetanide] Myalgia  Ciprofloxacin Rash  Statins-Hmg-Coa Reductase Inhibitors Other (comments) Muscle pain Lipitor/crestor/zocor  Sulfa (Sulfonamide Antibiotics) Rash  Tetracycline Other (comments) musclepain  Torsemide Rash and Myalgia  Zetia [Ezetimibe] Myalgia Review of Systems:  Pertinent items are noted in HPI. Objective:  
 
Patient Vitals for the past 8 hrs: 
 BP Temp Pulse Resp SpO2  
20 1220 (!) 129/51 98.6 °F (37 °C) (!) 110 14 100 % 20 1206 (!) 118/41 97.8 °F (36.6 °C) (!) 108 14 100 % 20 1154 (!) 113/47 98.2 °F (36.8 °C) (!) 104 18 100 % 20 0755 (!) 115/55 97.5 °F (36.4 °C) (!) 106 17 100 % 20 0659   (!) 108   Temp (24hrs), Av.1 °F (36.7 °C), Min:97.5 °F (36.4 °C), Max:98.6 °F (37 °C) EXAM: GEN: Well developed obese female in NAD 
PSYCH:  AAO x 4 MUSC/NEURO: Pain to palpation lumbar spinous processes is diffuse. No thoracic pain to palpation. No pain with pelvic rocking or compression. No pain with IR or ER of the hips. PF: 4+/5 right and 5/5 left. DF: 4+/5 right and 5/5 left. EHL and EFL: 4+/5 right and 5/5 left.   S/LT decreased in S1 dermatomal pattern BL. There is a draining ulcer anterior lateral left leg. Edema is diffuse in both legs. Pitting type edema 2+ BL LE.   
+DP BL LE IMAGING: 
IMPRESSION IMPRESSION: 
No evidence of acute fracture or dislocation. No significant disc or facet pathology. 
  
Epidural lipomatosis with moderate thecal sac stenosis at the L5-S1 level. Data Review Recent Results (from the past 24 hour(s)) EKG, 12 LEAD, INITIAL Collection Time: 09/29/20  8:16 PM  
Result Value Ref Range Ventricular Rate 99 BPM  
 Atrial Rate 99 BPM  
 P-R Interval 140 ms QRS Duration 84 ms Q-T Interval 340 ms QTC Calculation (Bezet) 436 ms Calculated P Axis 65 degrees Calculated R Axis -32 degrees Calculated T Axis 34 degrees Diagnosis Sinus rhythm with marked sinus arrhythmia Left axis deviation Low voltage QRS Abnormal ECG When compared with ECG of 29-MAY-2020 14:07, Minimal criteria for Anterior infarct are no longer present T wave inversion no longer evident in Anterior leads CBC WITH AUTOMATED DIFF Collection Time: 09/29/20  8:27 PM  
Result Value Ref Range WBC 20.3 (H) 3.6 - 11.0 K/uL  
 RBC 2.48 (L) 3.80 - 5.20 M/uL HGB 5.1 (LL) 11.5 - 16.0 g/dL HCT 19.6 (L) 35.0 - 47.0 % MCV 79.0 (L) 80.0 - 99.0 FL  
 MCH 20.6 (L) 26.0 - 34.0 PG  
 MCHC 26.0 (L) 30.0 - 36.5 g/dL  
 RDW 18.3 (H) 11.5 - 14.5 % PLATELET 255 770 - 216 K/uL MPV 10.2 8.9 - 12.9 FL  
 NRBC 0.4 (H) 0  WBC ABSOLUTE NRBC 0.09 (H) 0.00 - 0.01 K/uL NEUTROPHILS 89 (H) 32 - 75 % BAND NEUTROPHILS 2 0 - 6 % LYMPHOCYTES 3 (L) 12 - 49 % MONOCYTES 6 5 - 13 % EOSINOPHILS 0 0 - 7 % BASOPHILS 0 0 - 1 % IMMATURE GRANULOCYTES 0 %  
 ABS. NEUTROPHILS 18.5 (H) 1.8 - 8.0 K/UL  
 ABS. LYMPHOCYTES 0.6 (L) 0.8 - 3.5 K/UL  
 ABS. MONOCYTES 1.2 (H) 0.0 - 1.0 K/UL  
 ABS. EOSINOPHILS 0.0 0.0 - 0.4 K/UL  
 ABS. BASOPHILS 0.0 0.0 - 0.1 K/UL  
 ABS. IMM. GRANS. 0.0 K/UL  
 DF MANUAL RBC COMMENTS ANISOCYTOSIS 1+ 
    
 RBC COMMENTS MICROCYTOSIS 1+ 
    
 RBC COMMENTS HYPOCHROMIA 2+ 
    
 RBC COMMENTS POLYCHROMASIA PRESENT 
    
 RBC COMMENTS OVALOCYTES PRESENT Pathologist review Hypochromic, normocytic anemia. METABOLIC PANEL, BASIC Collection Time: 09/29/20  8:27 PM  
Result Value Ref Range Sodium 137 136 - 145 mmol/L Potassium 4.3 3.5 - 5.1 mmol/L Chloride 103 97 - 108 mmol/L  
 CO2 30 21 - 32 mmol/L Anion gap 4 (L) 5 - 15 mmol/L Glucose 190 (H) 65 - 100 mg/dL BUN 29 (H) 6 - 20 MG/DL Creatinine 1.07 (H) 0.55 - 1.02 MG/DL  
 BUN/Creatinine ratio 27 (H) 12 - 20 GFR est AA >60 >60 ml/min/1.73m2 GFR est non-AA 50 (L) >60 ml/min/1.73m2 Calcium 7.7 (L) 8.5 - 10.1 MG/DL MAGNESIUM Collection Time: 09/29/20  8:27 PM  
Result Value Ref Range Magnesium 2.2 1.6 - 2.4 mg/dL NT-PRO BNP Collection Time: 09/29/20  8:27 PM  
Result Value Ref Range NT pro-BNP 1,562 (H) <125 PG/ML  
TROPONIN I Collection Time: 09/29/20  8:27 PM  
Result Value Ref Range Troponin-I, Qt. <0.05 <0.05 ng/mL SAMPLES BEING HELD Collection Time: 09/29/20  8:27 PM  
Result Value Ref Range SAMPLES BEING HELD SST. CHELY   
 COMMENT Add-on orders for these samples will be processed based on acceptable specimen integrity and analyte stability, which may vary by analyte. FERRITIN Collection Time: 09/29/20  8:27 PM  
Result Value Ref Range Ferritin 22 (L) 26 - 388 NG/ML  
IRON PROFILE Collection Time: 09/29/20  8:27 PM  
Result Value Ref Range Iron 16 (L) 35 - 150 ug/dL TIBC 305 250 - 450 ug/dL Iron % saturation 5 (L) 20 - 50 % VITAMIN B12 Collection Time: 09/29/20  8:27 PM  
Result Value Ref Range Vitamin B12 159 (L) 193 - 986 pg/mL HEMOGLOBIN A1C WITH EAG Collection Time: 09/29/20  8:27 PM  
Result Value Ref Range Hemoglobin A1c 7.6 (H) 4.0 - 5.6 %  Est. average glucose 171 mg/dL  
RBC, ALLOCATE  
 Collection Time: 09/29/20  9:00 PM  
Result Value Ref Range HISTORY CHECKED? Historical check performed TYPE & SCREEN Collection Time: 09/29/20  9:31 PM  
Result Value Ref Range Crossmatch Expiration 10/02/2020 ABO/Rh(D) A POSITIVE Antibody screen NEG Unit number [id number] Blood component type  LR Unit division 00 Status of unit TRANSFUSED Crossmatch result Compatible Unit number [id number] Blood component type  LR Unit division 00 Status of unit REL FROM [employer] [facility] Crossmatch result Compatible Unit number [id number] Blood component type  LRIR Unit division 00 Status of unit ISSUED Crossmatch result Compatible OCCULT BLOOD, STOOL Collection Time: 09/29/20 10:14 PM  
Result Value Ref Range Occult blood, stool Negative NEG    
RBC, ALLOCATE Collection Time: 09/29/20 11:30 PM  
Result Value Ref Range HISTORY CHECKED? Historical check performed RBC, ALLOCATE Collection Time: 09/30/20  7:45 AM  
Result Value Ref Range HISTORY CHECKED? Historical check performed GLUCOSE, POC Collection Time: 09/30/20  8:12 AM  
Result Value Ref Range Glucose (POC) 126 (H) 65 - 100 mg/dL Performed by Darshana Hamilton METABOLIC PANEL, COMPREHENSIVE Collection Time: 09/30/20 10:19 AM  
Result Value Ref Range Sodium 140 136 - 145 mmol/L Potassium 3.6 3.5 - 5.1 mmol/L Chloride 105 97 - 108 mmol/L  
 CO2 30 21 - 32 mmol/L Anion gap 5 5 - 15 mmol/L Glucose 115 (H) 65 - 100 mg/dL BUN 27 (H) 6 - 20 MG/DL Creatinine 0.99 0.55 - 1.02 MG/DL  
 BUN/Creatinine ratio 27 (H) 12 - 20 GFR est AA >60 >60 ml/min/1.73m2 GFR est non-AA 55 (L) >60 ml/min/1.73m2 Calcium 7.6 (L) 8.5 - 10.1 MG/DL Bilirubin, total 1.8 (H) 0.2 - 1.0 MG/DL  
 ALT (SGPT) 38 12 - 78 U/L  
 AST (SGOT) 25 15 - 37 U/L Alk. phosphatase 154 (H) 45 - 117 U/L Protein, total 4.8 (L) 6.4 - 8.2 g/dL Albumin 2.2 (L) 3.5 - 5.0 g/dL Globulin 2.6 2.0 - 4.0 g/dL A-G Ratio 0.8 (L) 1.1 - 2.2    
CBC WITH AUTOMATED DIFF Collection Time: 09/30/20 10:19 AM  
Result Value Ref Range WBC 17.7 (H) 3.6 - 11.0 K/uL  
 RBC 2.67 (L) 3.80 - 5.20 M/uL HGB 6.1 (L) 11.5 - 16.0 g/dL HCT 21.7 (L) 35.0 - 47.0 % MCV 81.3 80.0 - 99.0 FL  
 MCH 22.8 (L) 26.0 - 34.0 PG  
 MCHC 28.1 (L) 30.0 - 36.5 g/dL  
 RDW 17.3 (H) 11.5 - 14.5 % PLATELET 731 307 - 628 K/uL MPV 10.1 8.9 - 12.9 FL  
 NRBC 1.4 (H) 0  WBC ABSOLUTE NRBC 0.24 (H) 0.00 - 0.01 K/uL NEUTROPHILS 81 (H) 32 - 75 % LYMPHOCYTES 8 (L) 12 - 49 % MONOCYTES 8 5 - 13 % EOSINOPHILS 1 0 - 7 % BASOPHILS 0 0 - 1 % IMMATURE GRANULOCYTES 2 (H) 0.0 - 0.5 % ABS. NEUTROPHILS 14.3 (H) 1.8 - 8.0 K/UL  
 ABS. LYMPHOCYTES 1.4 0.8 - 3.5 K/UL  
 ABS. MONOCYTES 1.4 (H) 0.0 - 1.0 K/UL  
 ABS. EOSINOPHILS 0.2 0.0 - 0.4 K/UL  
 ABS. BASOPHILS 0.0 0.0 - 0.1 K/UL  
 ABS. IMM. GRANS. 0.4 (H) 0.00 - 0.04 K/UL  
 DF SMEAR SCANNED    
 RBC COMMENTS HYPOCHROMIA 2+ 
    
 RBC COMMENTS ANISOCYTOSIS 1+ 
    
 RBC COMMENTS POLYCHROMASIA 1+ WBC COMMENTS TOXIC GRANULATION    
PROTHROMBIN TIME + INR Collection Time: 09/30/20 10:19 AM  
Result Value Ref Range INR 1.1 0.9 - 1.1 Prothrombin time 10.9 9.0 - 11.1 sec GLUCOSE, POC Collection Time: 09/30/20 11:51 AM  
Result Value Ref Range Glucose (POC) 109 (H) 65 - 100 mg/dL Performed by Edgar Espinoza Assessment/Plan:  
A: 1. L3 compression fracture, acute P: 1. MRI of Lumbar spine today. 2. She did poorly last admission in 2018 with a T7 compression fracture. She is refusing a brace today and said it would interfere with her breathing. We could attempt a LSO, but I am highly skeptical of compliance.   In this setting, we will get a MRI to fully evaluate the fracture and consider kyphoplasty if she is medically stable and leukocytosis is improving. If she can not I would attempt bracing with a LSO. 3. Orthopedics to follow. Discussed case with Dr. Gill Bence who agrees with plan. SALIMA Gary Orthopaedic Surgery  [facility]

## 2020-09-30 NOTE — PROGRESS NOTES
0730-Bedside and Verbal shift change report given to Marivel Sanders (oncoming nurse) by Diallo Emery (offgoing nurse). Report included the following information SBAR, Kardex, Intake/Output, MAR, Accordion, Recent Results and Med Rec Status. 0745-Verbal order for midline placement per Dr. Julious Sacks. Multiple attempts to stick patient over night without success.   
 
Ordered by Dr. Julious Sacks to not give most recent order for pRBC's, orders to check hgb first then will notify MD.

## 2020-09-30 NOTE — PROGRESS NOTES
1629- BG 54. 
 
1637- Pt provided with 8oz of orange juice. 1652- BG critically low at 32.  
 
1655- Recheck showed BG at 51. Notified Dr. Freddie Rubinstein, orders to give pt more orange juice and recheck in 15 minutes. [address] BG showed 39. Rechecked at [phone] on other hand which showed 131.  
 
1717- Rechecked again BG showed 114. Pt provided with peanut butter and crackers, dinner tray also at bedside. 1715- Notified Dr. Freddie Rubinstein of inconsistencies in blood glucose and pts finger tips appearing blue in color but warm to touch. Ordered for stat BMP. Notified of pts pain being uncontrolled with Norco, ordered for 1mg IV Morphine q6h PRN. Canceled order for fecal occult, negative result last night. Glucose on BMP was 200.

## 2020-09-30 NOTE — ED NOTES
Multiple attempt to star an 20 or 18G IV . Dr. Octavio Swartz was notified. Dr. Octavio Swartz unable to start an [address] or large bore IV,  22G IV started in the [facility] at this time.

## 2020-09-30 NOTE — CONSULTS
Rosa Callejas. Toña Narayan, [address] [place] [address] 
[phone] office 
[phone] voicemail Gastroenterology Consultation Note Admit Date: 9/29/2020 Consult Date: 9/30/2020 I greatly appreciate your asking me to see Kary Gonzalez, thank you very much for the opportunity to participate in her care. Narrative Assessment and Plan · Anemia · Back pain · Chronic use anticoagulation Although fobt negative her anemia and microcytosis suggest GI blood loss a possible explanation for her symptoms. As she reports she has never had colonoscopy that procedure is indicated for colon cancer screening. I discussed the indications, risks, benefits and alternatives to EGD/Colonoscopy for anemia and screening and she has agreed to proceed. I need some more time to allow her anticoagulation to 'wash out' so plan is prep tomorrow for EGD/colonoscopy Friday. She agreed. Prep orders written. Following. Subjective: Chief Complaint: Anemia History of Present Illness: Anemia, located in blood, discovered/present since admission, associated with weakness/fall, no radiation. Reports no abdominal pain vomiting or weight loss. Denies visible blood in stool although she notes occasional dark stool. Has history of hiatal hernia now s/p repair many years ago. PCP: 
Israel Carter MD 
 
Past Medical History:  
Diagnosis Date  Asthma  Atrial fibrillation ([address] 11/16/2018  Autoimmune disease ([address]   
 fibromyalgia  CAD (coronary artery disease) 10/9/2015  Closed wedge compression fracture of T7 vertebra ([address] 11/13/2018  Diabetes ([address]  DVT (deep vein thrombosis) in pregnancy  GERD (gastroesophageal reflux disease)  Heart failure ([address]  History of vascular access device 09/30/2020  
 4f midline, 12cm at 1cm out placed in L Cephalic by Lisa Bull RN  
 HTN (hypertension)  NSTEMI (non-ST elevated myocardial infarction) ([address] 10/9/2015  Obesity (BMI 30-39.9) 11/13/2018  Sleep apnea   
 wears CPAP Past Surgical History:  
Procedure Laterality Date  ABDOMEN SURGERY PROC UNLISTED    
 hital hernia repair, gall bladder removed  AMPUTATION TRANSMETACARPAL Right 06/12/2019  
 entire ring finger, 2nd & 3rd fingers (transmetacarpal)  BREAST SURGERY PROCEDURE UNLISTED    
 lumpectomy  CARDIAC SURG PROCEDURE UNLIST  10/9/15  
 2 stents [place]  HX COLOSTOMY    
 and reversal, post episiotomy repair [address]  HX UROLOGICAL  2009  
 bladder sling Social History Tobacco Use  Smoking status: Former Smoker Last attempt to quit: 3/30/2017 Years since quitting: 3.5  Smokeless tobacco: Never Used Substance Use Topics  Alcohol use: No  
  Alcohol/week: 0.0 standard drinks Comment: very very rarely Family History Problem Relation Age of Onset  Diabetes Mother  Heart Failure Mother  Kidney Disease Mother  Diabetes Father  Heart Disease Father  Elevated Lipids Father  Heart Failure Father  Heart Disease Brother  Cancer Brother   
     kidney  Diabetes Brother  No Known Problems Brother  No Known Problems Brother Allergies Allergen Reactions  Advair Diskus [Fluticasone Propion-Salmeterol] Rash  Aspartame Other (comments)  Breo Ellipta [Fluticasone Furoate-Vilanterol] Rash  Bumex [Bumetanide] Myalgia  Ciprofloxacin Rash  Statins-Hmg-Coa Reductase Inhibitors Other (comments) Muscle pain Lipitor/crestor/zocor  Sulfa (Sulfonamide Antibiotics) Rash  Tetracycline Other (comments) musclepain  Torsemide Rash and Myalgia  Zetia [Ezetimibe] Myalgia Home Medications: 
Prior to Admission Medications Prescriptions Last Dose Informant Patient Reported? Taking? DULoxetine (CYMBALTA) 60 mg capsule 9/29/2020 at AM Self Yes Yes Sig: Take 60 mg by mouth daily. Omeprazole delayed release (PRILOSEC D/R) 20 mg tablet 2020 at AM Self Yes Yes Sig: Take 20 mg by mouth two (2) times a day. acetaminophen (TYLENOL) 325 mg tablet  Self No Yes Sig: Take 1 Tab by mouth every four (4) hours as needed for Pain. albuterol (PROAIR HFA) 90 mcg/actuation inhaler  Self Yes Yes Sig: Take 2 Puffs by inhalation every four (4) hours as needed. albuterol (PROVENTIL VENTOLIN) 2.5 mg /3 mL (0.083 %) nebulizer solution  Self Yes Yes Si.5 mg by Nebulization route every six (6) hours as needed for Wheezing or Shortness of Breath. azelastine-fluticasone (DYMISTA) 137-50 mcg/spray spry  Self Yes Yes Si Sprays by Nasal route daily as needed (allergies). biotin 10,000 mcg cap 2020 at AM Self Yes Yes Sig: Take 1 Cap by mouth daily. cholecalciferol, vitamin D3, (Vitamin D3) 50 mcg (2,000 unit) tab 2020 at AM Self Yes Yes Sig: Take 2,000 Units by mouth daily. ibandronate (BONIVA) 150 mg tablet 2020 Self Yes Yes Sig: Take 150 mg by mouth every thirty (30) days. lactobacillus rhamnosus gg 10 billion cell (CULTURELLE) 10 billion cell capsule 2020 at AM Self Yes Yes Sig: Take 1 Cap by mouth daily. magnesium oxide (MAG-OX) 400 mg tablet 2020 at PM Self Yes Yes Sig: Take 400 mg by mouth nightly. metoprolol tartrate (LOPRESSOR) 25 mg tablet  Self Yes Yes Sig: Take 25 mg by mouth daily as needed (For systolic >549). mirtazapine (REMERON) 15 mg tablet 2020 at PM Self Yes Yes Sig: Take 15 mg by mouth nightly. mometasone-formoterol (DULERA) 200-5 mcg/actuation HFA inhaler  Self Yes Yes Sig: Take 2 Puffs by inhalation two (2) times daily as needed. montelukast (Singulair) 10 mg tablet 2020 at AM Self Yes Yes Sig: Take 10 mg by mouth daily. potassium chloride SR (KLOR-CON 10) 10 mEq tablet 2020 at AM Self Yes Yes Sig: Take 10 mEq by mouth two (2) times a day. predniSONE (DELTASONE) 10 mg tablet 9/29/2020 at AM Self Yes Yes Sig: Take 10 mg by mouth two (2) times daily (with meals). promethazine (PHENERGAN) 25 mg tablet  Self Yes Yes Sig: Take 25 mg by mouth every eight (8) hours as needed for Nausea (Nausea not relieved by ondansetron). sAXagliptin (ONGLYZA) 5 mg tab tablet 9/29/2020 at AM Self Yes Yes Sig: Take 5 mg by mouth daily. tiotropium bromide (Spiriva Respimat) 2.5 mcg/actuation inhaler  Self Yes Yes Sig: Take 2 Puffs by inhalation daily as needed. traMADoL (ULTRAM) 50 mg tablet  Self Yes Yes Sig: Take 50 mg by mouth every six (6) hours as needed for Pain. Facility-Administered Medications: None Hospital Medications: 
Current Facility-Administered Medications Medication Dose Route Frequency  influenza vaccine 2020-21 (6 mos+)(PF) (FLUARIX/FLULAVAL/FLUZONE QUAD) injection 0.5 mL  0.5 mL IntraMUSCular PRIOR TO DISCHARGE  metoprolol tartrate (LOPRESSOR) tablet 25 mg  25 mg Oral DAILY PRN  
 mirtazapine (REMERON) tablet 15 mg  15 mg Oral QHS  DULoxetine (CYMBALTA) capsule 60 mg  60 mg Oral DAILY  0.9% sodium chloride infusion 250 mL  250 mL IntraVENous PRN  
 montelukast (SINGULAIR) tablet 10 mg  10 mg Oral DAILY  potassium chloride SR (KLOR-CON 10) tablet 10 mEq  10 mEq Oral BID  pantoprazole (PROTONIX) 40 mg in 0.9% sodium chloride 10 mL injection  40 mg IntraVENous Q12H  
 HYDROcodone-acetaminophen (NORCO) 5-325 mg per tablet 1 Tab  1 Tab Oral Q4H PRN  
 [START ON 10/1/2020] furosemide (LASIX) tablet 20 mg  20 mg Oral DAILY  0.9% sodium chloride infusion 250 mL  250 mL IntraVENous PRN  
 sodium chloride (NS) flush 5-40 mL  5-40 mL IntraVENous Q8H  
 sodium chloride (NS) flush 5-40 mL  5-40 mL IntraVENous PRN  
 acetaminophen (TYLENOL) tablet 650 mg  650 mg Oral Q6H PRN  Or  
 acetaminophen (TYLENOL) suppository 650 mg  650 mg Rectal Q6H PRN  
  polyethylene glycol (MIRALAX) packet 17 g  17 g Oral DAILY PRN  promethazine (PHENERGAN) tablet 12.5 mg  12.5 mg Oral Q6H PRN Or  
 ondansetron (ZOFRAN) injection 4 mg  4 mg IntraVENous Q6H PRN  
 glucose chewable tablet 16 g  4 Tab Oral PRN  
 dextrose (D50W) injection syrg 12.5-25 g  25-50 mL IntraVENous PRN  
 glucagon (GLUCAGEN) injection 1 mg  1 mg IntraMUSCular PRN  
 insulin lispro (HUMALOG) injection   SubCUTAneous AC&HS  
 0.9% sodium chloride infusion 250 mL  250 mL IntraVENous PRN Review of Systems: Admission ROS by Floridalma Gabriel MD from 9/29/2020 were reviewed with the patient and changes (other than per HPI) include: none Objective:  
 
Physical Exam: 
Visit Vitals BP (!) 135/55 (BP 1 Location: Right arm, BP Patient Position: At rest) Pulse (!) 112 Temp 98.1 °F (36.7 °C) Resp 16 Wt 115.6 kg (254 lb 12.8 oz) SpO2 92% BMI 39.91 kg/m² SpO2 Readings from Last 6 Encounters:  
09/30/20 92% 06/02/20 100% 03/31/20 95% 01/31/20 96% 10/31/19 96% 09/09/19 100% O2 Flow Rate (L/min): 3 l/min Intake/Output Summary (Last 24 hours) at 9/30/2020 1313 Last data filed at 9/30/2020 5677 Gross per 24 hour Intake 572.5 ml Output 875 ml Net -302.5 ml  
  
General: no distress, comfortable Skin:  No rash or palpable dermatologic mass lesions HEENT: Pupils equal, sclera anicteric, oropharynx with no gross lesions Cardiovascular: No abnormal audible heart sounds, well perfused, no edema Respiratory:  No abnormal audible breath sounds, normal respiratory effort, no throacic deformity GI:  Abdomen nondistended, nontender, no mass, no free fluid, no rebound or guarding. Musculoskeletal:  No skeletal deformity nor acute arthritis noted. Neurological:  Motor and sensory function intact in upper extremeties Psychiatric:  Normal affect, memory intact, appears to have insight into current illness Lymphatic:  No cervical, supraclavicular, or periumbilic lymphadenopathy Laboratory:   
Recent Results (from the past 24 hour(s)) EKG, 12 LEAD, INITIAL Collection Time: 09/29/20  8:16 PM  
Result Value Ref Range Ventricular Rate 99 BPM  
 Atrial Rate 99 BPM  
 P-R Interval 140 ms QRS Duration 84 ms Q-T Interval 340 ms QTC Calculation (Bezet) 436 ms Calculated P Axis 65 degrees Calculated R Axis -32 degrees Calculated T Axis 34 degrees Diagnosis Sinus rhythm with marked sinus arrhythmia Left axis deviation Low voltage QRS Abnormal ECG When compared with ECG of 29-MAY-2020 14:07, Minimal criteria for Anterior infarct are no longer present T wave inversion no longer evident in Anterior leads CBC WITH AUTOMATED DIFF Collection Time: 09/29/20  8:27 PM  
Result Value Ref Range WBC 20.3 (H) 3.6 - 11.0 K/uL  
 RBC 2.48 (L) 3.80 - 5.20 M/uL HGB 5.1 (LL) 11.5 - 16.0 g/dL HCT 19.6 (L) 35.0 - 47.0 % MCV 79.0 (L) 80.0 - 99.0 FL  
 MCH 20.6 (L) 26.0 - 34.0 PG  
 MCHC 26.0 (L) 30.0 - 36.5 g/dL  
 RDW 18.3 (H) 11.5 - 14.5 % PLATELET 722 611 - 693 K/uL MPV 10.2 8.9 - 12.9 FL  
 NRBC 0.4 (H) 0  WBC ABSOLUTE NRBC 0.09 (H) 0.00 - 0.01 K/uL NEUTROPHILS 89 (H) 32 - 75 % BAND NEUTROPHILS 2 0 - 6 % LYMPHOCYTES 3 (L) 12 - 49 % MONOCYTES 6 5 - 13 % EOSINOPHILS 0 0 - 7 % BASOPHILS 0 0 - 1 % IMMATURE GRANULOCYTES 0 %  
 ABS. NEUTROPHILS 18.5 (H) 1.8 - 8.0 K/UL  
 ABS. LYMPHOCYTES 0.6 (L) 0.8 - 3.5 K/UL  
 ABS. MONOCYTES 1.2 (H) 0.0 - 1.0 K/UL  
 ABS. EOSINOPHILS 0.0 0.0 - 0.4 K/UL  
 ABS. BASOPHILS 0.0 0.0 - 0.1 K/UL  
 ABS. IMM. GRANS. 0.0 K/UL  
 DF MANUAL    
 RBC COMMENTS ANISOCYTOSIS 1+ 
    
 RBC COMMENTS MICROCYTOSIS 1+ 
    
 RBC COMMENTS HYPOCHROMIA 2+ 
    
 RBC COMMENTS POLYCHROMASIA PRESENT 
    
 RBC COMMENTS OVALOCYTES PRESENT Pathologist review Hypochromic, normocytic anemia.    
METABOLIC PANEL, BASIC  
 Collection Time: 09/29/20  8:27 PM  
Result Value Ref Range Sodium 137 136 - 145 mmol/L Potassium 4.3 3.5 - 5.1 mmol/L Chloride 103 97 - 108 mmol/L  
 CO2 30 21 - 32 mmol/L Anion gap 4 (L) 5 - 15 mmol/L Glucose 190 (H) 65 - 100 mg/dL BUN 29 (H) 6 - 20 MG/DL Creatinine 1.07 (H) 0.55 - 1.02 MG/DL  
 BUN/Creatinine ratio 27 (H) 12 - 20 GFR est AA >60 >60 ml/min/1.73m2 GFR est non-AA 50 (L) >60 ml/min/1.73m2 Calcium 7.7 (L) 8.5 - 10.1 MG/DL MAGNESIUM Collection Time: 09/29/20  8:27 PM  
Result Value Ref Range Magnesium 2.2 1.6 - 2.4 mg/dL NT-PRO BNP Collection Time: 09/29/20  8:27 PM  
Result Value Ref Range NT pro-BNP 1,562 (H) <125 PG/ML  
TROPONIN I Collection Time: 09/29/20  8:27 PM  
Result Value Ref Range Troponin-I, Qt. <0.05 <0.05 ng/mL SAMPLES BEING HELD Collection Time: 09/29/20  8:27 PM  
Result Value Ref Range SAMPLES BEING HELD SST. CHELY   
 COMMENT Add-on orders for these samples will be processed based on acceptable specimen integrity and analyte stability, which may vary by analyte. FERRITIN Collection Time: 09/29/20  8:27 PM  
Result Value Ref Range Ferritin 22 (L) 26 - 388 NG/ML  
IRON PROFILE Collection Time: 09/29/20  8:27 PM  
Result Value Ref Range Iron 16 (L) 35 - 150 ug/dL TIBC 305 250 - 450 ug/dL Iron % saturation 5 (L) 20 - 50 % VITAMIN B12 Collection Time: 09/29/20  8:27 PM  
Result Value Ref Range Vitamin B12 159 (L) 193 - 986 pg/mL HEMOGLOBIN A1C WITH EAG Collection Time: 09/29/20  8:27 PM  
Result Value Ref Range Hemoglobin A1c 7.6 (H) 4.0 - 5.6 % Est. average glucose 171 mg/dL  
RBC, ALLOCATE Collection Time: 09/29/20  9:00 PM  
Result Value Ref Range HISTORY CHECKED? Historical check performed TYPE & SCREEN Collection Time: 09/29/20  9:31 PM  
Result Value Ref Range Crossmatch Expiration 10/02/2020 ABO/Rh(D) A POSITIVE  Antibody screen NEG   
 Unit number [id number] Blood component type RC LR Unit division 00 Status of unit TRANSFUSED Crossmatch result Compatible Unit number [id number] Blood component type RC LR Unit division 00 Status of unit REL FROM [facility] Crossmatch result Compatible Unit number [id number] Blood component type RC LRIR Unit division 00 Status of unit ISSUED Crossmatch result Compatible OCCULT BLOOD, STOOL Collection Time: 09/29/20 10:14 PM  
Result Value Ref Range Occult blood, stool Negative NEG    
RBC, ALLOCATE Collection Time: 09/29/20 11:30 PM  
Result Value Ref Range HISTORY CHECKED? Historical check performed RBC, ALLOCATE Collection Time: 09/30/20  7:45 AM  
Result Value Ref Range HISTORY CHECKED? Historical check performed GLUCOSE, POC Collection Time: 09/30/20  8:12 AM  
Result Value Ref Range Glucose (POC) 126 (H) 65 - 100 mg/dL Performed by Maryan Bryson METABOLIC PANEL, COMPREHENSIVE Collection Time: 09/30/20 10:19 AM  
Result Value Ref Range Sodium 140 136 - 145 mmol/L Potassium 3.6 3.5 - 5.1 mmol/L Chloride 105 97 - 108 mmol/L  
 CO2 30 21 - 32 mmol/L Anion gap 5 5 - 15 mmol/L Glucose 115 (H) 65 - 100 mg/dL BUN 27 (H) 6 - 20 MG/DL Creatinine 0.99 0.55 - 1.02 MG/DL  
 BUN/Creatinine ratio 27 (H) 12 - 20 GFR est AA >60 >60 ml/min/1.73m2 GFR est non-AA 55 (L) >60 ml/min/1.73m2 Calcium 7.6 (L) 8.5 - 10.1 MG/DL Bilirubin, total 1.8 (H) 0.2 - 1.0 MG/DL  
 ALT (SGPT) 38 12 - 78 U/L  
 AST (SGOT) 25 15 - 37 U/L Alk. phosphatase 154 (H) 45 - 117 U/L Protein, total 4.8 (L) 6.4 - 8.2 g/dL Albumin 2.2 (L) 3.5 - 5.0 g/dL Globulin 2.6 2.0 - 4.0 g/dL A-G Ratio 0.8 (L) 1.1 - 2.2    
CBC WITH AUTOMATED DIFF Collection Time: 09/30/20 10:19 AM  
Result Value Ref Range WBC 17.7 (H) 3.6 - 11.0 K/uL  
 RBC 2.67 (L) 3.80 - 5.20 M/uL HGB 6.1 (L) 11.5 - 16.0 g/dL HCT 21.7 (L) 35.0 - 47.0 % MCV 81.3 80.0 - 99.0 FL  
 MCH 22.8 (L) 26.0 - 34.0 PG  
 MCHC 28.1 (L) 30.0 - 36.5 g/dL  
 RDW 17.3 (H) 11.5 - 14.5 % PLATELET 714 463 - 450 K/uL MPV 10.1 8.9 - 12.9 FL  
 NRBC 1.4 (H) 0  WBC ABSOLUTE NRBC 0.24 (H) 0.00 - 0.01 K/uL NEUTROPHILS 81 (H) 32 - 75 % LYMPHOCYTES 8 (L) 12 - 49 % MONOCYTES 8 5 - 13 % EOSINOPHILS 1 0 - 7 % BASOPHILS 0 0 - 1 % IMMATURE GRANULOCYTES 2 (H) 0.0 - 0.5 % ABS. NEUTROPHILS 14.3 (H) 1.8 - 8.0 K/UL  
 ABS. LYMPHOCYTES 1.4 0.8 - 3.5 K/UL  
 ABS. MONOCYTES 1.4 (H) 0.0 - 1.0 K/UL  
 ABS. EOSINOPHILS 0.2 0.0 - 0.4 K/UL  
 ABS. BASOPHILS 0.0 0.0 - 0.1 K/UL  
 ABS. IMM. GRANS. 0.4 (H) 0.00 - 0.04 K/UL  
 DF SMEAR SCANNED    
 RBC COMMENTS HYPOCHROMIA 2+ 
    
 RBC COMMENTS ANISOCYTOSIS 1+ 
    
 RBC COMMENTS POLYCHROMASIA 1+ WBC COMMENTS TOXIC GRANULATION    
PROTHROMBIN TIME + INR Collection Time: 09/30/20 10:19 AM  
Result Value Ref Range INR 1.1 0.9 - 1.1 Prothrombin time 10.9 9.0 - 11.1 sec GLUCOSE, POC Collection Time: 09/30/20 11:51 AM  
Result Value Ref Range Glucose (POC) 109 (H) 65 - 100 mg/dL Performed by Lin Wolfe Assessment/Plan: Active Problems: 
  Syncope and collapse (9/29/2020) See above narrative for full detail.

## 2020-09-30 NOTE — PROGRESS NOTES
[facility]: MED RECONCILIATION Comments/Recommendations:  
Medication reconciliation completed by patient over the phone, knowledgeable regarding medications. Patient had AM medications prior to hospital arrival. 
Of note, patient does not take any inhalers scheduled, takes all PRN, including maintenance inhalers. Patient prescribed torsemide and took 1 tablet yesterday and it caused a facial rash-information added to allergies. Confirmed preferred pharmacy as [facility]. Medications added:  
 
Tramadol Sitagliptin Montelukast  
 
Medications removed: 
 
Glipizide Medications adjusted: 
 
Dymista daily PRN adjusted from daily Dulera BID PRN adjusted from BID Spiriva 2.5mcg daily PRN adjusted from 18mcg handihaler daily Information obtained from: patient, Rx query Allergies: Advair diskus [fluticasone propion-salmeterol]; Aspartame; Breo ellipta [fluticasone furoate-vilanterol]; Bumex [bumetanide]; Ciprofloxacin; Statins-hmg-coa reductase inhibitors; Sulfa (sulfonamide antibiotics); Tetracycline; Torsemide; and Zetia [ezetimibe] Prior to Admission Medications:  
 
Medication Documentation Review Audit Reviewed by Girish Clark (Pharmacist) on 09/29/20 at [phone] Medication Sig Documenting Provider Last Dose Status Taking?  
acetaminophen (TYLENOL) 325 mg tablet Take 1 Tab by mouth every four (4) hours as needed for Pain. Ginny Noel MD  Active Yes  
albuterol [facility] HFA) 90 mcg/actuation inhaler Take 2 Puffs by inhalation every four (4) hours as needed. Provider, Historical  Active Yes  
albuterol (PROVENTIL VENTOLIN) 2.5 mg /3 mL (0.083 %) nebulizer solution 2.5 mg by Nebulization route every six (6) hours as needed for Wheezing or Shortness of Breath. Provider, Historical  Active Yes  
apixaban (ELIQUIS) 5 mg tablet Take 1 Tab by mouth two (2) times a day. Ginny Noel MD 9/29/2020 AM Active Yes aspirin delayed-release 81 mg tablet Take 81 mg by mouth daily. Provider, Historical 9/29/2020 AM Active Yes  
azelastine-fluticasone (DYMISTA) 137-50 mcg/spray spry 2 Sprays by Nasal route daily as needed (allergies). Provider, Historical  Active Yes  
biotin 10,000 mcg cap Take 1 Cap by mouth daily. Provider, Historical 9/29/2020 AM Active Yes  
cholecalciferol, vitamin D3, (Vitamin D3) 50 mcg (2,000 unit) tab Take 2,000 Units by mouth daily. Provider, Historical 9/29/2020 AM Active Yes DULoxetine (CYMBALTA) 60 mg capsule Take 60 mg by mouth daily. Provider, Historical 9/29/2020 AM Active Yes  
ibandronate (BONIVA) 150 mg tablet Take 150 mg by mouth every thirty (30) days. Provider, Historical 9/1/2020 Active Yes Med Note (Antonia Conconully   Sat Jul 13, 2019  6:04 PM)    
lactobacillus rhamnosus gg 10 billion cell (CULTURELLE) 10 billion cell capsule Take 1 Cap by mouth daily. Provider, Historical 9/29/2020 AM Active Yes  
magnesium oxide (MAG-OX) 400 mg tablet Take 400 mg by mouth nightly. Provider, Historical 9/28/2020 PM Active Yes  
metoprolol tartrate (LOPRESSOR) 25 mg tablet Take 25 mg by mouth daily as needed (For systolic >800). Provider, Historical  Active Yes  
mirtazapine (REMERON) 15 mg tablet Take 15 mg by mouth nightly. Provider, Historical 9/28/2020 PM Active Yes  
mometasone-formoterol (DULERA) 200-5 mcg/actuation HFA inhaler Take 2 Puffs by inhalation two (2) times daily as needed. Provider, Historical  Active Yes  
montelukast (Singulair) 10 mg tablet Take 10 mg by mouth daily. Provider, Historical 9/29/2020 AM Active Yes Omeprazole delayed release (PRILOSEC D/R) 20 mg tablet Take 20 mg by mouth two (2) times a day. Provider, Historical 9/29/2020 AM Active Yes  
potassium chloride SR (KLOR-CON 10) 10 mEq tablet Take 10 mEq by mouth two (2) times a day.  Provider, Historical 9/29/2020 AM Active Yes  
predniSONE (DELTASONE) 10 mg tablet Take 10 mg by mouth two (2) times daily (with meals). Provider, Historical 9/29/2020 AM Active Yes  
promethazine (PHENERGAN) 25 mg tablet Take 25 mg by mouth every eight (8) hours as needed for Nausea (Nausea not relieved by ondansetron). Provider, Historical  Active Yes sAXagliptin (ONGLYZA) 5 mg tab tablet Take 5 mg by mouth daily. Provider, Historical 9/29/2020 AM Active Yes  
tiotropium bromide (Spiriva Respimat) 2.5 mcg/actuation inhaler Take 2 Puffs by inhalation daily as needed. Provider, Historical  Active Yes  
traMADoL (ULTRAM) 50 mg tablet Take 50 mg by mouth every six (6) hours as needed for Pain. Provider, Historical  Active Yes Thank you, Marlon Back, PharmD, BCPS   Contact: [phone]

## 2020-09-30 NOTE — ED NOTES
TRANSFER - OUT REPORT: 
 
Verbal report given to Sveta Varma RN(name) on Dunia Landry  being transferred to 3rd floor(unit) for routine progression of care Report consisted of patients Situation, Background, Assessment and  
Recommendations(SBAR). Information from the following report(s) SBAR, ED Summary, Intake/Output, MAR, Recent Results and Cardiac Rhythm Sinus Tach was reviewed with the receiving nurse. Lines:  
Peripheral IV 09/29/20 Left Antecubital (Active) Site Assessment Clean, dry, & intact 09/29/20 2134 Phlebitis Assessment 0 09/29/20 2134 Infiltration Assessment 0 09/29/20 2134 Dressing Status Clean, dry, & intact 09/29/20 2134 Peripheral IV 09/29/20 Right Antecubital (Active) Site Assessment Clean, dry, & intact 09/29/20 2320 Phlebitis Assessment 0 09/29/20 2320 Infiltration Assessment 0 09/29/20 2320 Dressing Status Clean, dry, & intact 09/29/20 2320 Dressing Type Transparent 09/29/20 2320 Hub Color/Line Status Blue 09/29/20 2320 Opportunity for questions and clarification was provided. Patient transported with: 
 Monitor O2 @ 3 liters Registered Nurse Visit Vitals BP (!) 147/74 (BP 1 Location: Right arm, BP Patient Position: At rest) Pulse (!) 102 Temp 98.1 °F (36.7 °C) Resp 15 Wt 115.6 kg (254 lb 12.8 oz) SpO2 100% BMI 39.91 kg/m²

## 2020-09-30 NOTE — PROGRESS NOTES
CM assessment completed via EMR review and collaboration with nursing staff. No contact with patient for this assessment. Unable to meet with patient at this time, echocardiogram in progress. Care Management follow up Patient admitted for syncope, collapse, fall. Hx HTN, afib, CAD with stents, NSTEMI, C7 compression fracture, GERD, sleep apnea- wears cpap, temporal arteritis. RUR score 26%/ moderate risk Current status Patient currently requiring medical management, evaluation of symptoms. Echocardiogram currently being done. EGD/colonoscopy for Friday. Transition of Care Plan 1. Monitor patient status and response to treatment. 2. Medical management and evaluation continues. 3. EGD/colonoscopy scheduled for Friday. 4. Unsure of discharge needs at this time. 5. Will completed CM assessment when patient available. Jazmyn Meek, RN, MSN/Care manager

## 2020-09-30 NOTE — ED PROVIDER NOTES
Patient is a 82-year-old with fluid retention who has not been tolerating her diuretics and she comes in after a near syncopal episode without an associated fall. She reports an allergic reaction to both Bumex and torsemide and has had worsening exercise tolerance and peripheral edema. The history is provided by the patient. Syncope This is a new problem. The current episode started less than 1 hour ago. The problem has not changed since onset. There was no loss of consciousness. Associated symptoms include chest pain, light-headedness and head injury (on blood thinner). Pertinent negatives include no fever, no abdominal pain, no bowel incontinence, no vomiting, no bladder incontinence, no headaches, no dizziness and no focal weakness. Her past medical history is significant for CAD, DM and HTN. Past Medical History:  
Diagnosis Date  Asthma  Atrial fibrillation ([address] 11/16/2018  Autoimmune disease ([address]   
 fibromyalgia  CAD (coronary artery disease) 10/9/2015  Closed wedge compression fracture of T7 vertebra ([address] 11/13/2018  Diabetes ([address]  DVT (deep vein thrombosis) in pregnancy  GERD (gastroesophageal reflux disease)  Heart failure ([address]  HTN (hypertension)  NSTEMI (non-ST elevated myocardial infarction) ([address] 10/9/2015  Obesity (BMI 30-39.9) 11/13/2018  Sleep apnea   
 wears CPAP Past Surgical History:  
Procedure Laterality Date  ABDOMEN SURGERY PROC UNLISTED    
 hital hernia repair, gall bladder removed  AMPUTATION TRANSMETACARPAL Right 06/12/2019  
 entire ring finger, 2nd & 3rd fingers (transmetacarpal)  BREAST SURGERY PROCEDURE UNLISTED    
 lumpectomy  CARDIAC SURG PROCEDURE UNLIST  10/9/15  
 2 stents [place]  HX COLOSTOMY    
 and reversal, post episiotomy repair [address]  HX UROLOGICAL  2009  
 bladder sling Family History:  
Problem Relation Age of Onset  Diabetes Mother  Heart Failure Mother  Kidney Disease Mother  Diabetes Father  Heart Disease Father  Elevated Lipids Father  Heart Failure Father  Heart Disease Brother  Cancer Brother   
     kidney  Diabetes Brother  No Known Problems Brother  No Known Problems Brother Social History Socioeconomic History  Marital status:  Spouse name: Not on file  Number of children: Not on file  Years of education: Not on file  Highest education level: Not on file Occupational History  Not on file Social Needs  Financial resource strain: Not on file  Food insecurity Worry: Not on file Inability: Not on file  Transportation needs Medical: Not on file Non-medical: Not on file Tobacco Use  Smoking status: Former Smoker Last attempt to quit: 3/30/2017 Years since quitting: 3.5  Smokeless tobacco: Never Used Substance and Sexual Activity  Alcohol use: No  
  Alcohol/week: 0.0 standard drinks Comment: very very rarely  Drug use: No  
 Sexual activity: Never Lifestyle  Physical activity Days per week: Not on file Minutes per session: Not on file  Stress: Not on file Relationships  Social connections Talks on phone: Not on file Gets together: Not on file Attends Hoahaoism service: Not on file Active member of club or organization: Not on file Attends meetings of clubs or organizations: Not on file Relationship status: Not on file  Intimate partner violence Fear of current or ex partner: Not on file Emotionally abused: Not on file Physically abused: Not on file Forced sexual activity: Not on file Other Topics Concern  Not on file Social History Narrative  Not on file ALLERGIES: Advair diskus [fluticasone propion-salmeterol];  Aspartame; Breo ellipta [fluticasone furoate-vilanterol]; Bumex [bumetanide]; Ciprofloxacin; Statins-hmg-coa reductase inhibitors; Sulfa (sulfonamide antibiotics); Tetracycline; Torsemide; and Zetia [ezetimibe] Review of Systems Constitutional: Negative for chills and fever. Respiratory: Positive for shortness of breath. Cardiovascular: Positive for chest pain, leg swelling and syncope. Gastrointestinal: Negative for abdominal pain, bowel incontinence, constipation, diarrhea and vomiting. Genitourinary: Negative for bladder incontinence. Neurological: Positive for light-headedness. Negative for dizziness, focal weakness and headaches. All other systems reviewed and are negative. Vitals:  
 09/29/20 1905 09/29/20 1912 BP: 125/61 Pulse: (!) 104 Resp: 19 Temp: 97.8 °F (36.6 °C) SpO2: 100% 100% Weight: 115.6 kg (254 lb 12.8 oz) Physical Exam 
Vitals signs and nursing note reviewed. Constitutional:   
   Appearance: She is well-developed. HENT:  
   Head: Normocephalic and atraumatic. Eyes:  
   General: No scleral icterus. Neck: Musculoskeletal: Normal range of motion. Cardiovascular:  
   Rate and Rhythm: Regular rhythm. Tachycardia present. Pulmonary:  
   Effort: Pulmonary effort is normal.  
Abdominal:  
   General: There is no distension. Tenderness: There is no abdominal tenderness. Musculoskeletal:  
   Right lower leg: Edema present. Left lower leg: Edema present. Comments: 2-3+ pitting edema up to the abdomen Skin: 
   General: Skin is warm and dry. Findings: No erythema or rash. Neurological:  
   General: No focal deficit present. Mental Status: She is alert and oriented to person, place, and time. Psychiatric:     
   Mood and Affect: Mood normal.     
   Behavior: Behavior normal.  
 
  
 
MDM Number of Diagnoses or Management Options Anemia, unspecified type:  
Chest pain on exertion: Dyspnea on exertion:  
Near syncope:  
Diagnosis management comments: SHARATH STEMI, stroke, subarachnoid hemorrhage, intracranial hemorrhage, electrolyte abnormality, arrhythmia, sepsis, CHF, pneumonia, pneumothorax, pericarditis, ACS, anemia Procedures EKG at 8:16 PM 
Normal sinus rhythm, 99 bpm, left axis deviation, no STEMI, occasional PACs EKG interpreted by . Evette Barillas MD  
 
Patient is being admitted to the hospital.  The results of their tests and reasons for their admission have been discussed with them and/or available family. They convey agreement and understanding for the need to be admitted and for their admission diagnosis. Consultation will be made now with the inpatient physician for hospitalization. Perfect Serve Consult for Admission 10:16 PM 
 
ED Room Number: BP94/40 Patient Name and age:  Mel Henao 69 y.o.  female Working Diagnosis: 1. Near syncope 2. Dyspnea on exertion 3. Chest pain on exertion 4. Anemia, unspecified type 5. Closed fracture of third lumbar vertebra, unspecified fracture morphology, initial encounter ([facility] [address] COVID-19 Suspicion:  no 
Sepsis present:  no  Reassessment needed: no 
Code Status:  Full Code Readmission: no 
Isolation Requirements:  no 
Recommended Level of Care:  telemetry Department:[facility] ED - [phone] Other:  Fecal occult pending

## 2020-10-01 NOTE — PROGRESS NOTES
1990- Dr. Brittny Stephenson aware of patient increasing heart rate and BP 97/49- no new orders at this time- cardiology will see patient 
 
0700- Dr. Brittny Stephenson at bedside- BP recheck 129/53-PO lopressor administered to control HR- pain medication administered at 0617. Bedside shift change report given to [facility] (oncoming nurse) by Elder Banda. (offgoing nurse). Report included the following information SBAR, Kardex, Intake/Output, MAR, Accordion and Recent Results.

## 2020-10-01 NOTE — PROGRESS NOTES
Spiritual Care Assessment/Progress Note [address] 
 
 
NAME: Bibi Quispe      MRN: [id number] AGE: 69 y.o. SEX: female Mormon Affiliation: Baptist Language: English  
 
10/1/2020     Total Time (in minutes): 5 Spiritual Assessment begun in SF 3 PROG CARE TELE 2 through conversation with: 
  
    []Patient        [] Family    [] Friend(s) Reason for Consult: Rapid response team  
 
Spiritual beliefs: (Please include comment if needed) 
   [] Identifies with a leanna tradition:     
   [] Supported by a leanna community:        
   [] Claims no spiritual orientation:       
   [] Seeking spiritual identity:            
   [] Adheres to an individual form of spirituality:       
   [x] Not able to assess:                   
 
    
Identified resources for coping:  
   [] Prayer                           
   [] Music                  [] Guided Imagery 
   [] Family/friends                 [] Pet visits [] Devotional reading                         [] Unknown 
   [] Other:                                        
 
 
Interventions offered during this visit: (See comments for more details) Plan of Care: 
 
 [] Support spiritual and/or cultural needs  
 [] Support AMD and/or advance care planning process    
 [] Support grieving process 
 [] Coordinate Rites and/or Rituals  
 [] Coordination with community clergy [] No spiritual needs identified at this time 
 [] Detailed Plan of Care below (See Comments)  [] Make referral to Music Therapy 
[] Make referral to Pet Therapy    
[] Make referral to Addiction services 
[] Make referral to [facility] 
[] Make referral to Spiritual Care Partner 
[] No future visits requested       
[x] Follow up visits as needed Comments:   responded to Code RRT at [address] [place] [address]. Staff were attending to pt and no family present at the time of visit. Spiritual care will follow up as able or as needed. Visited by: Bulmaro Cheung. To dejan : [phone])

## 2020-10-01 NOTE — PROGRESS NOTES
Physical Therapy: 
Orders received and chart reviewed. Patient still with elevated pain, not able to participate with therapies. Episodes of SVT this AM.  Still medically complex. We will continue to follow and re-attempt later as able. Thank you.  
Phu Dempsey PT,DPT,NCS

## 2020-10-01 NOTE — PROGRESS NOTES
0720-Pt HR in 180's this AM. Verbal order to recheck hgb at 1200 and not to start bowel prep until after hgb has come back. 56- Notified Dr. Baez Patience of events this AM, plans to reschedule colonoscopy for later in hospital stay or outpatient. 21 - Telephone order for diabetic diet per Dr. Sukhjinder Pulido. 1230- Assessed patients skin with MADAI Zuniga. Lotion, dressings, and bed pads changed. Patient educated on importance's of proper skin care and turning q2h to prevent pressure injuries. Patient verbalized understanding. 1425- Pt went into Afib RVR. Rapid response called. EKG completed, verified a fib RVR. ICU RN to bedside. Dr. Sukhjinder Pulido notified, notified Leoma Goldberg, NP. Ordered to transfer patient to stepdown for dilt gtt. Pt transferred to 336.  
  
1640- Called pts daughter to update on patients transfer, daughter did not answer, LVM. 1645- Updated pts daughter Jessica Bhatti.

## 2020-10-01 NOTE — PROGRESS NOTES
0700: Bedside shift change report given to [address] [place] [address] Road (oncoming nurse) by Anastasiia Mtz RN  (offgoing nurse). Report included the following information SBAR, Kardex, Procedure Summary, Intake/Output, MAR, Accordion and Cardiac Rhythm Sinus tach

## 2020-10-01 NOTE — PROGRESS NOTES
GI note Complains of too much pain to conduct colonoscopy prep and had a run of SVT this morning. Will cancel prep, she can feed. I will work to arrange GI testing later in this hospitalization or as an outpatient. Nat Damon MD  
-------------- Abilio Colon. Tawana Ash, [address] [place] [address] 
[phone] office 
[phone] [facility] Progress Note October 1, 2020 Admit Date: 9/29/2020 Narrative Assessment and Plan · Anemia, guaiac negative stool I had planned for EGD and colonoscopy tomorrow but the patient reports she is in too much pain with her back to conduct prep or do procedures. I've cancelled the endoscopic testing and we'll have to arrange that next week or as an outpatient. Serial h/h, transfuse if <7. 
Her focus right now is on her back pain. Subjective: · My back Location:  back Duration: days Timing: constant Radiation: none Associated Symptoms: none Aggravating/Alleviating factors: moving ROS:  The previous review of systems on initial consultation / H&P is noted and reviewed. Specific changes noted above in HPI. Current Medications:  
 
Current Facility-Administered Medications Medication Dose Route Frequency  influenza vaccine 2020-21 (6 mos+)(PF) (FLUARIX/FLULAVAL/FLUZONE QUAD) injection 0.5 mL  0.5 mL IntraMUSCular PRIOR TO DISCHARGE  metoprolol tartrate (LOPRESSOR) tablet 25 mg  25 mg Oral DAILY PRN  
 mirtazapine (REMERON) tablet 15 mg  15 mg Oral QHS  DULoxetine (CYMBALTA) capsule 60 mg  60 mg Oral DAILY  montelukast (SINGULAIR) tablet 10 mg  10 mg Oral DAILY  potassium chloride SR (KLOR-CON 10) tablet 10 mEq  10 mEq Oral BID  pantoprazole (PROTONIX) 40 mg in 0.9% sodium chloride 10 mL injection  40 mg IntraVENous Q12H  
 HYDROcodone-acetaminophen (NORCO) 5-325 mg per tablet 1 Tab  1 Tab Oral Q4H PRN  
 [Held by provider] furosemide (LASIX) tablet 20 mg  20 mg Oral DAILY  morphine injection 1 mg  1 mg IntraVENous Q6H PRN  
 0.9% sodium chloride infusion 250 mL  250 mL IntraVENous PRN  
 sodium chloride (NS) flush 5-40 mL  5-40 mL IntraVENous Q8H  
 sodium chloride (NS) flush 5-40 mL  5-40 mL IntraVENous PRN  
 acetaminophen (TYLENOL) tablet 650 mg  650 mg Oral Q6H PRN Or  
 acetaminophen (TYLENOL) suppository 650 mg  650 mg Rectal Q6H PRN  polyethylene glycol (MIRALAX) packet 17 g  17 g Oral DAILY PRN  promethazine (PHENERGAN) tablet 12.5 mg  12.5 mg Oral Q6H PRN Or  
 ondansetron (ZOFRAN) injection 4 mg  4 mg IntraVENous Q6H PRN  
 glucose chewable tablet 16 g  4 Tab Oral PRN  
 dextrose (D50W) injection syrg 12.5-25 g  25-50 mL IntraVENous PRN  
 glucagon (GLUCAGEN) injection 1 mg  1 mg IntraMUSCular PRN  
 insulin lispro (HUMALOG) injection   SubCUTAneous AC&HS  
 0.9% sodium chloride infusion 250 mL  250 mL IntraVENous PRN Objective: VITALS:  
Last 24hrs VS reviewed since prior progress note. Most recent are: 
Visit Vitals BP (!) 112/45 (BP 1 Location: Right arm, BP Patient Position: At rest) Pulse (!) 101 Temp 99.1 °F (37.3 °C) Resp 21 Ht 5' 7\" (1.702 m) Wt 113.4 kg (250 lb) SpO2 92% BMI 39.16 kg/m² Temp (24hrs), Av.9 °F (37.2 °C), Min:98.1 °F (36.7 °C), Max:99.4 °F (37.4 °C) Intake/Output Summary (Last 24 hours) at 10/1/2020 1247 Last data filed at 10/1/2020 1237 Gross per 24 hour Intake 808.3 ml Output 1450 ml Net -641.7 ml EXAM: 
General:  Appears uncomfortable, no cardiorespiratory distress   
HEENT:  Atraumatic skull, pupils equal 
Lungs:   No abnormal audible breath sounds. Speaking in complete sentences Abdomen:   Nondistended, nontender. No mass, guarding or rebound Musc:   No skeletal defects or deformities Neurologic:   Cranial nerves grossly intact, moves all 4 extremities Psych:    Good insight. Not anxious nor agitated Lab Data Reviewed:  
Recent Labs 10/01/20 
0530 20 [id number] 09/30/20 
1019 09/29/20 2027 WBC 18.8*  --  17.7* 20.3* HGB 7.1* 7.6* 6.1* 5.1*  
HCT 24.8* 26.6* 21.7* 19.6*   --  303 370 Recent Labs 10/01/20 
0530 09/30/20 
1718 09/30/20 
1019 09/29/20 2027  140 140 137  
K 3.5 3.4* 3.6 4.3  103 105 103 CO2 32 33* 30 30 * 200* 115* 190* BUN 27* 27* 27* 29* CREA 1.20* 1.13* 0.99 1.07* CA 7.6* 7.3* 7.6* 7.7* MG  --   --   --  2.2 ALB 2.2*  --  2.2*  --   
TBILI 3.2*  --  1.8*  --   
ALT 40  --  38  --   
INR  --   --  1.1  --   
 
Lab Results Component Value Date/Time Glucose (POC) 146 (H) 10/01/2020 11:05 AM  
 Glucose (POC) 164 (H) 10/01/2020 08:00 AM  
 Glucose (POC) 134 (H) 09/30/2020 09:26 PM  
 Glucose (POC) 114 (H) 09/30/2020 05:17 PM  
 Glucose (POC) 131 (H) 09/30/2020 05:16 PM  
 
No results for input(s): PH, PCO2, PO2, HCO3, FIO2 in the last 72 hours. Recent Labs  
  09/30/20 
1019 INR 1.1 Assessment: (See above) Active Problems: 
  Syncope and collapse (9/29/2020) Plan: (See above) Signed By: Saulo Lynne MD   
 10/1/2020  12:47 PM

## 2020-10-01 NOTE — PROGRESS NOTES
Patient in AF RVR rate up to 170 Hypotensive 250 cc bolus Start dilt gtt Noted endoscopy procedures cancelled for tomorrow - to be done OP Patient breathing comfortably in NAD Visit Vitals BP (!) 112/45 (BP 1 Location: Right arm, BP Patient Position: At rest) Pulse (!) 101 Temp 99.1 °F (37.3 °C) Resp 21 Ht 5' 7\" (1.702 m) Wt 250 lb (113.4 kg) SpO2 92% BMI 39.16 kg/m²

## 2020-10-01 NOTE — WOUND CARE
Wound care consult: 
Initial visit for skin and wound assessment present on admission- alert- pain with activity and repositioning due to back pain- intervention pending. Assessed with staff RN Assessment All skin folds and bony prominences assessed, turned with staff assistance. Skin thin and fragile - multiple scattered bruises on arms and lower legs Heels intact- discoloration on left medial great toe and left dorsal foot- present on admission- unknown etilogy Left medial and lateral upper leg- full thickness wound ~3x3x cm pink moist base, lindsay wound moist- tissue thin States legs swell and blisters develop- arms edematous with small intact blisters and weeping Right lower leg - less than 1x1 cm area partial thickness skin loss- pink- serous drainage Skin is thin, fragile and edematous- 
States  has been treating wounds at home prior to admission. Skin folds intact- sacral area intact. Incontinent of urine- pure wick in place. Treatment Lower leg wounds cleansed- legs moisturized Alginate and foam applied to wounds Incontinent care given Repositioned in bed Heels floated Recommendations/Plan Wound and skin care in place. Turn, reposition every 2 hours as tolerated, float heels, off loading boots Incontinent care --- Apply Zinc to all open areas, moisture barrier as needed. Will follow, reconsult as needed.  
112 [facility]

## 2020-10-01 NOTE — PROGRESS NOTES
RRT Note  EKG performed Afib RVR, Dr Kvng Olson called and he requests to call cardiology Marin Smallwood NP advised by ICU RN Jaswinder Rosales and she says she is on her way 99/49 bp @ 1435. 
 
1438: Marin Smallwood arrives to bedside and says she will order Diltiazem gtt. Upgrade to Talha Howard. Pt briefly converted to NSR and back into Afib RVR Pt will be transferred to room 336 for stepdown level of care.

## 2020-10-01 NOTE — PROGRESS NOTES
Indication: Evaluate L3 compression fracture 
  
Exam: MRI of the lumbar spine. Sequences include sagittal and axial T1 and 
T2-weighted images. Sagittal STIR.  
  
Comparisons: 9/29/2020 
  
Contrast: None. 
  
Findings: Alignment is normal. There is acute superior endplate fracture of L3. There is less than 25% vertebral body height loss. Fracture demonstrates mild 
edema. There is extension of the fracture line to the posterior vertebral body 
cortex without significant retropulsion. No significant involvement of the 
posterior elements. Cord terminus is within normal limits. Paraspinous soft 
tissues are within normal limits. 
  
T12-L1: Small right paracentral disc protrusion without stenosis 
  
L1-L2: Mild disc height loss and disc bulge. No stenosis 
  
L2-L3: Mild facet hypertrophy. No disc bulge or stenosis 
  
L3-L4: Small disc bulge and facet hypertrophy. There is slight narrowing of the 
canal without significant foraminal narrowing 
  
L4-L5: Desiccation of this disc with a diffuse bulge asymmetric to the left. Mild facet degenerative change. Slight narrowing the canal and subarticular 
zones 
  
L5-S1: No stenosis 
  
IMPRESSION Impression: 1. Acute superior endplate fracture L3 No OrthoSpine surgery indications. Multiple medical issues presently being treated for. Recommend IR consult to consider Kyphoplasty when pt is medically stable for procedure. Pt. Refuses LSO brace. F/U outpatient with Dr. Inocencia Noble (OrthoSpine) in 2 weeks. Dr. Mahsa Smiley agrees with plan. Reconsult as needed. Thank you for allowing us to take part in this patients care. Danielle Bowden PA-C Orth[facility]

## 2020-10-01 NOTE — PROGRESS NOTES
Cardiology Progress Note [address], [place], [address] [facility] Phone [phone]; Fax [phone] 
 
 
 
10/1/2020 9:01 AM  
 
Admit Date:           2020 Admit Diagnosis:  Syncope and collapse [R55] :          1945 MRN:          [id number] Impression Plan/Recommendation 1. Near syncope 2. Severe anemia 3. GI bleed 4. Acute on chronic HFpEF per echo 20 5. CAD s/p PCI (2018) 6. COPD on home O2 7. PAD s/p fem pop 8. Permanent AF on chronic DOAC 9. DMII 10. HAYES on CPAP 11. Hypokalemia · S/p 2 units of PRBCs hgb stable @7 
· Holding eliquis/ASA- awaiting GI recommendation post procedure · Replete K-  
· Hold  PO lasix renal function up/BP soft plus she is prepping today · BB on hold d/t hypotension · Plans for colonoscopy and EGD on Friday- prep today Pt personally seen and examined. Chart reviewed. Agree with advanced NP's history, exam and  A/P with changes/additons. Hb improved with PRBC infusions; Dyspnea improved Add: Afib with RVR  - converted to SR with cardizem gtt/ Dig IV Reid Downing. MD, [facility] No intake/output data recorded. Last 3 Recorded Weights in this Encounter  
 20 1905 20 1345 10/01/20 0239 Weight: 254 lb 12.8 oz (115.6 kg) 254 lb (115.2 kg) 250 lb (113.4 kg) 1901 - 10/01 0700 In: 1500.8 [P.O.:520] Out: 2175 [Urine:2175] SUBJECTIVE Duong Hashimoto denies palpitations, irregular heart beat, SOB, chest pain  
+ back pain Breathing a little better today No lightheadedness or dizziness Current Facility-Administered Medications Medication Dose Route Frequency  influenza vaccine  (6 mos+)(PF) (FLUARIX/FLULAVAL/FLUZONE QUAD) injection 0.5 mL  0.5 mL IntraMUSCular PRIOR TO DISCHARGE  metoprolol tartrate (LOPRESSOR) tablet 25 mg  25 mg Oral DAILY PRN  
 mirtazapine (REMERON) tablet 15 mg  15 mg Oral QHS  DULoxetine (CYMBALTA) capsule 60 mg  60 mg Oral DAILY  montelukast (SINGULAIR) tablet 10 mg  10 mg Oral DAILY  potassium chloride SR (KLOR-CON 10) tablet 10 mEq  10 mEq Oral BID  pantoprazole (PROTONIX) 40 mg in 0.9% sodium chloride 10 mL injection  40 mg IntraVENous Q12H  
 HYDROcodone-acetaminophen (NORCO) 5-325 mg per tablet 1 Tab  1 Tab Oral Q4H PRN  
 furosemide (LASIX) tablet 20 mg  20 mg Oral DAILY  peg 3350-electrolytes (COLYTE) 4000 mL  2,000 mL Oral BID  morphine injection 1 mg  1 mg IntraVENous Q6H PRN  
 0.9% sodium chloride infusion 250 mL  250 mL IntraVENous PRN  
 sodium chloride (NS) flush 5-40 mL  5-40 mL IntraVENous Q8H  
 sodium chloride (NS) flush 5-40 mL  5-40 mL IntraVENous PRN  
 acetaminophen (TYLENOL) tablet 650 mg  650 mg Oral Q6H PRN Or  
 acetaminophen (TYLENOL) suppository 650 mg  650 mg Rectal Q6H PRN  polyethylene glycol (MIRALAX) packet 17 g  17 g Oral DAILY PRN  promethazine (PHENERGAN) tablet 12.5 mg  12.5 mg Oral Q6H PRN Or  
 ondansetron (ZOFRAN) injection 4 mg  4 mg IntraVENous Q6H PRN  
 glucose chewable tablet 16 g  4 Tab Oral PRN  
 dextrose (D50W) injection syrg 12.5-25 g  25-50 mL IntraVENous PRN  
 glucagon (GLUCAGEN) injection 1 mg  1 mg IntraMUSCular PRN  
 insulin lispro (HUMALOG) injection   SubCUTAneous AC&HS  
 0.9% sodium chloride infusion 250 mL  250 mL IntraVENous PRN OBJECTIVE Intake/Output Summary (Last 24 hours) at 10/1/2020 0901 Last data filed at 10/1/2020 0422 Gross per 24 hour Intake 928.3 ml Output 1300 ml Net -371.7 ml Review of Systems - History obtained from the patient AS PER  HPI Telemetry sinus tachycardia w freq burst of AF <3 seconds PHYSICAL EXAM  
  
 
Visit Vitals BP (!) 126/46 (BP 1 Location: Right arm, BP Patient Position: At rest) Pulse (!) 117 Temp 98.8 °F (37.1 °C) Resp 18 Ht 5' 7\" (1.702 m) Wt 250 lb (113.4 kg) SpO2 93% BMI 39.16 kg/m² Gen: Well-developed, elderly, obese, anasarca, in no acute distress  alert and oriented x 3 HEENT:  Pink conjunctivae, Hearing grossly normal.No scleral icterus or conjunctival, moist mucous membranes Neck: Supple,unable to appreciate  JVD d/t neck size Resp: No accessory muscle use, Clear breath sounds anterior. Exam limited d/t body habitus Card: Regular accelerated Rate,Rythm, 2/6 murmurs, rubs or gallop. No thrills. GI:          soft, non-tender MSK: No cyanosis or clubbing. + sacral edema Skin: No rashes or ulcers, + bruising BUe Neuro:  Cranial nerves are grossly intact, moving all four extremities, no focal deficit, follows commands appropriately Psych:  Good insight, oriented to person, place and time, alert, Nml Affect LE: +2 pitting edema DATA REVIEW Laboratory and Imaging have been reviewed by me and are notable for Recent Labs  
  09/29/20 2027 TROIQ <0.05 Recent Labs 10/01/20 
0530 09/30/20 
1718 09/30/20 
1019 09/29/20 2027  140 140 137  
K 3.5 3.4* 3.6 4.3  
CO2 32 33* 30 30 BUN 27* 27* 27* 29* CREA 1.20* 1.13* 0.99 1.07* * 200* 115* 190* MG  --   --   --  2.2 WBC 18.8*  --  17.7* 20.3* HGB 7.1* 7.6* 6.1* 5.1*  
HCT 24.8* 26.6* 21.7* 19.6*   --  303 370 Etelvina Royal NP

## 2020-10-01 NOTE — PROGRESS NOTES
Daily Progress Note: 10/1/2020 Benoit Bonilla MD 
 
Assessment/Plan:  
Acute Syncopal Episode with fracture L2 
- Pain control 
- Ortho consult CAD- stable CHF 
- Diuretics as needed Chronic A-fib 
- Holding Eliquis - Cards consult - Metoprolol Acute on chronic anemia - Transfused 2 units last night - AM labs pending - GI consult - colonoscopy planned - PPI Dehydration 
- IVF 
 
DM2 
- Monitor BG ac and hs - Check A1c 
 
HAYES - CPAP Depression/Anxiety - Remeron and Cymbata Nondisplaced acute fracture L3 superior endplate. 
- poss kyphoplasty when more stable Problem List: 
Problem List as of 10/1/2020 Date Reviewed: 5/29/2020 Codes Class Noted - Resolved Syncope and collapse ICD-10-CM: R55 
ICD-9-CM: 780.2  9/29/2020 - Present Cellulitis of lower extremity ICD-10-CM: L03.119 ICD-9-CM: 682.6  3/23/2020 - Present ASO (arteriosclerosis obliterans) ICD-10-CM: I70.90 ICD-9-CM: 440.9  9/5/2019 - Present PVD (peripheral vascular disease) ([facility] ICD-10-CM: I73.9 ICD-9-CM: 443.9  8/1/2019 - Present Severe obesity ([facility] ICD-10-CM: E66.01 
ICD-9-CM: 278.01  7/30/2019 - Present UTI (urinary tract infection) ICD-10-CM: N39.0 ICD-9-CM: 599.0  7/13/2019 - Present COPD (chronic obstructive pulmonary disease) ([facility]) ICD-10-CM: J44.9 ICD-9-CM: 444  7/13/2019 - Present Sepsis ([facility] ICD-10-CM: A41.9 ICD-9-CM: 038.9, 995.91  7/13/2019 - Present Normocytic anemia ICD-10-CM: D64.9 ICD-9-CM: 285.9  7/13/2019 - Present PAD (peripheral artery disease) ([facility]) ICD-10-CM: I73.9 ICD-9-CM: 443.9  7/13/2019 - Present Mild intermittent asthma ICD-10-CM: J45.20 ICD-9-CM: 493.90  5/17/2019 - Present Gangrene of finger of right hand [facility]) ICD-10-CM: J96 
ICD-9-CM: 785.4  5/17/2019 - Present Brachial artery thrombus (HCC) ICD-10-CM: D94.8 ICD-9-CM: 444.21  4/26/2019 - Present Sleep apnea ICD-10-CM: G47.30 ICD-9-CM: 780.57  1/5/2019 - Present Overview Signed 1/5/2019  8:41 PM by Lakshmi Joyner DO  
  wears CPAP Atrial fibrillation [facility]) ICD-10-CM: I48.91 
ICD-9-CM: 427.31  11/16/2018 - Present Obesity (BMI 30-39.9) (Chronic) ICD-10-CM: I44.6 ICD-9-CM: 278.00  11/13/2018 - Present Type 2 diabetes with nephropathy ([facility] ICD-10-CM: E11.21 
ICD-9-CM: 250.40, 583.81  10/1/2018 - Present Recurrent deep vein thrombosis (DVT) (HCC) ICD-10-CM: I82.409 ICD-9-CM: 453.40  3/30/2018 - Present Chronic anticoagulation (Chronic) ICD-10-CM: Z79.01 
ICD-9-CM: V58.61  3/30/2018 - Present Coronary artery disease involving native coronary artery without angina pectoris (Chronic) ICD-10-CM: I25.10 ICD-9-CM: 414.01  1/22/2016 - Present Essential hypertension (Chronic) ICD-10-CM: I10 
ICD-9-CM: 401.9  1/22/2016 - Present CAD (coronary artery disease) ICD-10-CM: I25.10 ICD-9-CM: 414.00  10/9/2015 - Present Type II diabetes mellitus (HCC) (Chronic) ICD-10-CM: E11.9 ICD-9-CM: 250.00  10/9/2015 - Present RESOLVED: Cellulitis ICD-10-CM: L03.90 ICD-9-CM: 682.9  3/23/2020 - 5/29/2020 RESOLVED: Hypokalemia ICD-10-CM: E87.6 ICD-9-CM: 276.8  3/23/2020 - 5/29/2020 RESOLVED: Hyponatremia ICD-10-CM: E87.1 ICD-9-CM: 276.1  3/23/2020 - 5/29/2020 RESOLVED: Diarrhea ICD-10-CM: R19.7 ICD-9-CM: 787.91  3/23/2020 - 5/29/2020 RESOLVED: Syncope and collapse ICD-10-CM: R55 
ICD-9-CM: 780.2  7/13/2019 - 5/29/2020 RESOLVED: Leg wound, left ICD-10-CM: W55.746X ICD-9-CM: 891.0  7/13/2019 - 5/29/2020 RESOLVED: Leg laceration, right, initial encounter ICD-10-CM: Y13.335A ICD-9-CM: 894.0  7/13/2019 - 5/29/2020 RESOLVED: Cellulitis of right upper arm ICD-10-CM: L03.113 ICD-9-CM: 682.3  5/17/2019 - 5/29/2020 RESOLVED: Bowel obstruction ([facility] ICD-10-CM: N65.774 ICD-9-CM: 560.9  1/8/2019 - 5/29/2020 RESOLVED: Partial small bowel obstruction ([facility] ICD-10-CM: K56.600 ICD-9-CM: 560.9  1/5/2019 - 5/29/2020 RESOLVED: Dyspnea ICD-10-CM: R06.00 
ICD-9-CM: 786.09  11/13/2018 - 5/29/2020 RESOLVED: ARF (acute renal failure) (HCC) ICD-10-CM: N17.9 ICD-9-CM: 584.9  11/13/2018 - 5/29/2020 RESOLVED: Closed wedge compression fracture of T7 vertebra ([facility] ICD-10-CM: X56.738Z ICD-9-CM: 805.2  11/13/2018 - 5/29/2020 RESOLVED: Chest pain ICD-10-CM: R07.9 ICD-9-CM: 786.50  6/27/2018 - 5/29/2020 RESOLVED: Abdominal pain ICD-10-CM: R10.9 ICD-9-CM: 789.00  6/27/2018 - 5/29/2020 RESOLVED: HTN (hypertension) ICD-10-CM: I10 
ICD-9-CM: 401.9  10/9/2015 - 1/22/2016 RESOLVED: NSTEMI (non-ST elevated myocardial infarction) ([facility] ICD-10-CM: I21.4 ICD-9-CM: 410.70  10/9/2015 - 5/29/2020 HPI:  
 72-year-old female with multiple medical problems including obesity, sleep apnea, non-ST-elevation myocardial infarction, primary hypertension, congestive heart failure, gastroesophageal reflux disease, type 2 diabetes mellitus, temporal arteritis, fibromyalgia, atrial fibrillation and asthma, was brought to the emergency room from home for complaints of lightheadedness, dizziness with subsequent fall and low back pain. Patient stated that whilst preparing some dinner in the evening hours of 09/29/2020, had the onset of symptoms with subsequent fall in a sitting position. Thereafter, patient noted persistent low back pain. Patient denied any recent changes in medication. Denied headaches, visual deficits, chest pain, palpitations, sore throat, cough, shortness of breath, nausea, vomiting, fevers, chills, abdominal pain, bladder or bowel irregularities. (Dr Gaurav Elizabeth) 9/30: She is c/o pain in her back. Meds are helping. Hb 5.1 and she has been transfused with 2 units.  AM labs pending and she will need a line as she is a difficult stick. May need more blood. She reports her stools have been dark so will ask GI to see. Holding Eliquis. She is on chronic steroids due to TA.  
 
10/1:  Heart rate up to 180s this AM - seems to have spiked up due to back pain but now back in 110s. No other complaint except back pain. No chest pain or SOB. Hb 7.1 this AM - watching. GI planning colonoscopy - starting prep. Poss kyphoplasty when more stable. Review of Systems: A comprehensive review of systems was negative except for that written in the HPI. Objective:  
Physical Exam:  
 
Visit Vitals BP (!) 129/53 Pulse (!) 117 Temp 99.3 °F (37.4 °C) Resp 22 Ht 5' 7\" (1.702 m) Wt 113.4 kg (250 lb) SpO2 95% BMI 39.16 kg/m² O2 Flow Rate (L/min): 3 l/min O2 Device: Nasal cannula Temp (24hrs), Av.6 °F (37 °C), Min:97.5 °F (36.4 °C), Max:99.4 °F (37.4 °C) No intake/output data recorded.  1901 - 10/01 0700 In: 1500.8 [P.O.:520] Out: 2175 [Urine:2175] General:  Alert, cooperative, no distress, obese Head:  Normocephalic, without obvious abnormality, atraumatic. Eyes:  Conjunctivae/corneas clear. PERRL, EOMs intact. Nose: Nares normal. Septum midline. Mucosa normal. No drainage or sinus tenderness. Throat: Lips, mucosa, and tongue normal. Teeth and gums normal.  
Neck: Supple, symmetrical, trachea midline, no adenopathy, thyroid: no enlargement/tenderness/nodules, no carotid bruit and no JVD. Back:   Symmetric, no curvature. ROM normal. No CVA tenderness. Lungs:   Clear to auscultation bilaterally. Chest wall:  No tenderness or deformity. Heart:  Irregular rhythm, tachy rate, S1, S2 normal, no murmur, click, rub or gallop. Abdomen:   Soft, non-tender. Bowel sounds normal. No masses,  No organomegaly. Extremities: Extremities with weeping and swelling, mildly erythematous,  No calf tenderness or cords. Pulses: 1+ and symmetric all extremities. Skin: Skin color, texture, turgor normal.   
Neurologic: CNII-XII intact. Alert and oriented X 3. Fine motor of hands and fingers normal.   equal.  No cogwheeling or rigidity. Gait not tested at this time. Sensation grossly normal to touch. Gross motor of extremities normal.    
 
Data Review:  
CT Head IMPRESSION: No acute findings. EXAM:  CT CERVICAL SPINE WITHOUT CONTRAST INDICATION: fall w head injury. COMPARISON: None. CONTRAST:  None. FINDINGS: 
Alignment is satisfactory, no acute fracture or dislocation. Moderate spondylosis and disc degeneration at C5-6 IMPRESSION 
 impression: No acute changes. CT LS spine Axial CT scan of the lumbar sacral spine obtained without contrast with coronal 
and sagittal reconstructions. No prior. CT dose reduction was achieved through 
the use of a standardized protocol tailored for this examination and automatic 
exposure control for dose modulation . Branden Sosa There is a minimally displaced fracture of the superior endplate of L3 without 
significant canal compromise. There is some mild diffuse spondylosis and disc degeneration. IMPRESSION 
 impression: Nondisplaced acute fracture L3 superior endplate. Recent Days: 
Recent Labs 10/01/20 
0530 09/30/20 
1718 09/30/20 
1019 09/29/20 
2027 WBC 18.8*  --  17.7* 20.3* HGB 7.1* 7.6* 6.1* 5.1*  
HCT 24.8* 26.6* 21.7* 19.6*   --  303 370 Recent Labs 10/01/20 
0530 09/30/20 
1718 09/30/20 
1019 09/29/20 
2027  140 140 137  
K 3.5 3.4* 3.6 4.3  103 105 103 CO2 32 33* 30 30 * 200* 115* 190* BUN 27* 27* 27* 29* CREA 1.20* 1.13* 0.99 1.07* CA 7.6* 7.3* 7.6* 7.7* MG  --   --   --  2.2 ALB 2.2*  --  2.2*  --   
TBILI 3.2*  --  1.8*  --   
ALT 40  --  38  --   
INR  --   --  1.1  --   
24 Hour Results: 
Recent Results (from the past 24 hour(s)) RBC, ALLOCATE Collection Time: 09/30/20  7:45 AM  
Result Value Ref Range HISTORY CHECKED? Historical check performed GLUCOSE, POC Collection Time: 09/30/20  8:12 AM  
Result Value Ref Range Glucose (POC) 126 (H) 65 - 100 mg/dL Performed by Cal William METABOLIC PANEL, COMPREHENSIVE Collection Time: 09/30/20 10:19 AM  
Result Value Ref Range Sodium 140 136 - 145 mmol/L Potassium 3.6 3.5 - 5.1 mmol/L Chloride 105 97 - 108 mmol/L  
 CO2 30 21 - 32 mmol/L Anion gap 5 5 - 15 mmol/L Glucose 115 (H) 65 - 100 mg/dL BUN 27 (H) 6 - 20 MG/DL Creatinine 0.99 0.55 - 1.02 MG/DL  
 BUN/Creatinine ratio 27 (H) 12 - 20 GFR est AA >60 >60 ml/min/1.73m2 GFR est non-AA 55 (L) >60 ml/min/1.73m2 Calcium 7.6 (L) 8.5 - 10.1 MG/DL Bilirubin, total 1.8 (H) 0.2 - 1.0 MG/DL  
 ALT (SGPT) 38 12 - 78 U/L  
 AST (SGOT) 25 15 - 37 U/L Alk. phosphatase 154 (H) 45 - 117 U/L Protein, total 4.8 (L) 6.4 - 8.2 g/dL Albumin 2.2 (L) 3.5 - 5.0 g/dL Globulin 2.6 2.0 - 4.0 g/dL A-G Ratio 0.8 (L) 1.1 - 2.2    
CBC WITH AUTOMATED DIFF Collection Time: 09/30/20 10:19 AM  
Result Value Ref Range WBC 17.7 (H) 3.6 - 11.0 K/uL  
 RBC 2.67 (L) 3.80 - 5.20 M/uL HGB 6.1 (L) 11.5 - 16.0 g/dL HCT 21.7 (L) 35.0 - 47.0 % MCV 81.3 80.0 - 99.0 FL  
 MCH 22.8 (L) 26.0 - 34.0 PG  
 MCHC 28.1 (L) 30.0 - 36.5 g/dL  
 RDW 17.3 (H) 11.5 - 14.5 % PLATELET 847 821 - 517 K/uL MPV 10.1 8.9 - 12.9 FL  
 NRBC 1.4 (H) 0  WBC ABSOLUTE NRBC 0.24 (H) 0.00 - 0.01 K/uL NEUTROPHILS 81 (H) 32 - 75 % LYMPHOCYTES 8 (L) 12 - 49 % MONOCYTES 8 5 - 13 % EOSINOPHILS 1 0 - 7 % BASOPHILS 0 0 - 1 % IMMATURE GRANULOCYTES 2 (H) 0.0 - 0.5 % ABS. NEUTROPHILS 14.3 (H) 1.8 - 8.0 K/UL  
 ABS. LYMPHOCYTES 1.4 0.8 - 3.5 K/UL  
 ABS. MONOCYTES 1.4 (H) 0.0 - 1.0 K/UL  
 ABS. EOSINOPHILS 0.2 0.0 - 0.4 K/UL  
 ABS. BASOPHILS 0.0 0.0 - 0.1 K/UL  
 ABS. IMM. GRANS. 0.4 (H) 0.00 - 0.04 K/UL  
 DF SMEAR SCANNED    
 RBC COMMENTS HYPOCHROMIA 2+ 
    
 RBC COMMENTS ANISOCYTOSIS 1+ 
    
 RBC COMMENTS POLYCHROMASIA 1+ WBC COMMENTS TOXIC GRANULATION    
PROTHROMBIN TIME + INR Collection Time: 09/30/20 10:19 AM  
Result Value Ref Range INR 1.1 0.9 - 1.1 Prothrombin time 10.9 9.0 - 11.1 sec GLUCOSE, POC Collection Time: 09/30/20 11:51 AM  
Result Value Ref Range Glucose (POC) 109 (H) 65 - 100 mg/dL Performed by Azzie Pallas ECHO ADULT COMPLETE Collection Time: 09/30/20  1:46 PM  
Result Value Ref Range IVSd 1.24 (A) 0.6 - 0.9 cm LVIDd 4.75 3.9 - 5.3 cm LVIDs 3.54 cm  
 LVOT d 1.95 cm  
 LVPWd 1.41 (A) 0.6 - 0.9 cm  
 LVOT Peak Gradient 14.02 mmHg Left Ventricular Outflow Tract Mean Gradient 6.15 mmHg LVOT SV 87.1 mL  
 LVOT Peak Velocity 187.20 cm/s LVOT VTI 29.14 cm RVIDd 4.81 cm Left Atrium Major Axis 4.49 cm LA Volume 82.33 22 - 52 mL  
 LA Vol 2C 81.42 (A) 22 - 52 mL  
 LA Vol 4C 66.78 (A) 22 - 52 mL Aortic Valve Area by Continuity of Peak Velocity 2.57 cm2 Aortic Valve Area by Continuity of VTI 2.48 cm2 AoV PG 19.43 mmHg Aortic Valve Systolic Mean Gradient 1.65 mmHg Aortic Valve Systolic Peak Velocity 896.24 cm/s Aortic valve mean velocity 139.32 cm/s AoV VTI 35.19 cm  
 MV A Andrew 131.63 cm/s Mitral Valve E Wave Deceleration Time 0.15 s MV E Andrew 136.69 cm/s  
 MV E/A 1.04   
 Pulmonic Valve Systolic Peak Instantaneous Gradient 16.30 mmHg Triscuspid Valve Regurgitation Peak Gradient 33.75 mmHg  
 TR Max Velocity 290.47 cm/s Ao Root D 3.20 cm LV Mass .6 67 - 162 g  
 LV Mass AL Index 111.1 43 - 95 g/m2 Left Atrium Minor Axis 2.01 cm  
 LA Vol Index 36.78 16 - 28 ml/m2 LA Vol Index 36.38 16 - 28 ml/m2 LA Vol Index 29.84 16 - 28 ml/m2 ANTOINETTE/BSA Pk Andrew 1.1 cm2/m2 ANTOINETTE/BSA VTI 1.1 cm2/m2 URINALYSIS W/MICROSCOPIC Collection Time: 09/30/20  3:50 PM  
Result Value Ref Range Color YELLOW/STRAW  Appearance TURBID (A) CLEAR    
 Specific gravity 1.012 1.003 - 1.030    
 pH (UA) 5.0 5.0 - 8.0 Protein Negative NEG mg/dL Glucose Negative NEG mg/dL Ketone Negative NEG mg/dL Bilirubin Negative NEG Blood Negative NEG Urobilinogen 0.2 0.2 - 1.0 EU/dL Nitrites Negative NEG Leukocyte Esterase Negative NEG    
 WBC 0-4 0 - 4 /hpf  
 RBC 0-5 0 - 5 /hpf Epithelial cells FEW FEW /lpf Bacteria 4+ (A) NEG /hpf Hyaline cast 0-2 0 - 5 /lpf URINE CULTURE HOLD SAMPLE Collection Time: 09/30/20  3:50 PM  
 Specimen: Serum; Urine Result Value Ref Range Urine culture hold Urine on hold in Microbiology dept for 2 days. If unpreserved urine is submitted, it cannot be used for addtional testing after 24 hours, recollection will be required. GLUCOSE, POC Collection Time: 09/30/20  4:29 PM  
Result Value Ref Range Glucose (POC) 54 (L) 65 - 100 mg/dL Performed by Darshana Hamilton GLUCOSE, POC Collection Time: 09/30/20  4:52 PM  
Result Value Ref Range Glucose (POC) 32 (LL) 65 - 100 mg/dL Performed by Manuela Rasmussen GLUCOSE, POC Collection Time: 09/30/20  4:55 PM  
Result Value Ref Range Glucose (POC) 51 (L) 65 - 100 mg/dL Performed by Manuela Rasmussen GLUCOSE, POC Collection Time: 09/30/20  5:14 PM  
Result Value Ref Range Glucose (POC) 39 (LL) 65 - 100 mg/dL Performed by Donovan Tena, POC Collection Time: 09/30/20  5:16 PM  
Result Value Ref Range Glucose (POC) 131 (H) 65 - 100 mg/dL Performed by Donovan Tena, POC Collection Time: 09/30/20  5:17 PM  
Result Value Ref Range Glucose (POC) 114 (H) 65 - 100 mg/dL Performed by CARMINA ALTAMIRANO   
HGB & HCT Collection Time: 09/30/20  5:18 PM  
Result Value Ref Range HGB 7.6 (L) 11.5 - 16.0 g/dL HCT 26.6 (L) 35.0 - 47.0 % METABOLIC PANEL, BASIC Collection Time: 09/30/20  5:18 PM  
Result Value Ref Range Sodium 140 136 - 145 mmol/L  Potassium 3.4 (L) 3.5 - 5.1 mmol/L  
 Chloride 103 97 - 108 mmol/L  
 CO2 33 (H) 21 - 32 mmol/L Anion gap 4 (L) 5 - 15 mmol/L Glucose 200 (H) 65 - 100 mg/dL BUN 27 (H) 6 - 20 MG/DL Creatinine 1.13 (H) 0.55 - 1.02 MG/DL  
 BUN/Creatinine ratio 24 (H) 12 - 20 GFR est AA 57 (L) >60 ml/min/1.73m2 GFR est non-AA 47 (L) >60 ml/min/1.73m2 Calcium 7.3 (L) 8.5 - 10.1 MG/DL  
GLUCOSE, POC Collection Time: 09/30/20  9:26 PM  
Result Value Ref Range Glucose (POC) 134 (H) 65 - 100 mg/dL Performed by [facility] Collection Time: 10/01/20  5:30 AM  
Result Value Ref Range Sodium 141 136 - 145 mmol/L Potassium 3.5 3.5 - 5.1 mmol/L Chloride 104 97 - 108 mmol/L  
 CO2 32 21 - 32 mmol/L Anion gap 5 5 - 15 mmol/L Glucose 167 (H) 65 - 100 mg/dL BUN 27 (H) 6 - 20 MG/DL Creatinine 1.20 (H) 0.55 - 1.02 MG/DL  
 BUN/Creatinine ratio 23 (H) 12 - 20 GFR est AA 54 (L) >60 ml/min/1.73m2 GFR est non-AA 44 (L) >60 ml/min/1.73m2 Calcium 7.6 (L) 8.5 - 10.1 MG/DL Bilirubin, total 3.2 (H) 0.2 - 1.0 MG/DL  
 ALT (SGPT) 40 12 - 78 U/L  
 AST (SGOT) 32 15 - 37 U/L Alk. phosphatase 158 (H) 45 - 117 U/L Protein, total 4.7 (L) 6.4 - 8.2 g/dL Albumin 2.2 (L) 3.5 - 5.0 g/dL Globulin 2.5 2.0 - 4.0 g/dL A-G Ratio 0.9 (L) 1.1 - 2.2    
CBC WITH AUTOMATED DIFF Collection Time: 10/01/20  5:30 AM  
Result Value Ref Range WBC 18.8 (H) 3.6 - 11.0 K/uL  
 RBC 3.01 (L) 3.80 - 5.20 M/uL HGB 7.1 (L) 11.5 - 16.0 g/dL HCT 24.8 (L) 35.0 - 47.0 % MCV 82.4 80.0 - 99.0 FL  
 MCH 23.6 (L) 26.0 - 34.0 PG  
 MCHC 28.6 (L) 30.0 - 36.5 g/dL  
 RDW 17.5 (H) 11.5 - 14.5 % PLATELET 340 679 - 063 K/uL MPV 10.5 8.9 - 12.9 FL  
 NRBC 1.8 (H) 0  WBC ABSOLUTE NRBC 0.34 (H) 0.00 - 0.01 K/uL NEUTROPHILS 84 (H) 32 - 75 % BAND NEUTROPHILS 5 0 - 6 % LYMPHOCYTES 5 (L) 12 - 49 % MONOCYTES 3 (L) 5 - 13 % EOSINOPHILS 0 0 - 7 % BASOPHILS 0 0 - 1 % METAMYELOCYTES 3 (H) 0 % IMMATURE GRANULOCYTES 0 %  
 ABS. NEUTROPHILS 16.7 (H) 1.8 - 8.0 K/UL  
 ABS. LYMPHOCYTES 0.9 0.8 - 3.5 K/UL  
 ABS. MONOCYTES 0.6 0.0 - 1.0 K/UL  
 ABS. EOSINOPHILS 0.0 0.0 - 0.4 K/UL  
 ABS. BASOPHILS 0.0 0.0 - 0.1 K/UL  
 ABS. IMM. GRANS. 0.0 K/UL  
 DF MANUAL    
 RBC COMMENTS ANISOCYTOSIS 1+ 
    
 RBC COMMENTS HYPOCHROMIA 1+ 
    
 RBC COMMENTS MICROCYTOSIS 1+ 
    
 RBC COMMENTS POLYCHROMASIA 1+ WBC COMMENTS TOXIC GRANULATION Medications reviewed Current Facility-Administered Medications Medication Dose Route Frequency  influenza vaccine 2020-21 (6 mos+)(PF) (FLUARIX/FLULAVAL/FLUZONE QUAD) injection 0.5 mL  0.5 mL IntraMUSCular PRIOR TO DISCHARGE  metoprolol tartrate (LOPRESSOR) tablet 25 mg  25 mg Oral DAILY PRN  
 mirtazapine (REMERON) tablet 15 mg  15 mg Oral QHS  DULoxetine (CYMBALTA) capsule 60 mg  60 mg Oral DAILY  montelukast (SINGULAIR) tablet 10 mg  10 mg Oral DAILY  potassium chloride SR (KLOR-CON 10) tablet 10 mEq  10 mEq Oral BID  pantoprazole (PROTONIX) 40 mg in 0.9% sodium chloride 10 mL injection  40 mg IntraVENous Q12H  
 HYDROcodone-acetaminophen (NORCO) 5-325 mg per tablet 1 Tab  1 Tab Oral Q4H PRN  
 furosemide (LASIX) tablet 20 mg  20 mg Oral DAILY  peg 3350-electrolytes (COLYTE) 4000 mL  2,000 mL Oral BID  morphine injection 1 mg  1 mg IntraVENous Q6H PRN  
 0.9% sodium chloride infusion 250 mL  250 mL IntraVENous PRN  
 sodium chloride (NS) flush 5-40 mL  5-40 mL IntraVENous Q8H  
 sodium chloride (NS) flush 5-40 mL  5-40 mL IntraVENous PRN  
 acetaminophen (TYLENOL) tablet 650 mg  650 mg Oral Q6H PRN Or  
 acetaminophen (TYLENOL) suppository 650 mg  650 mg Rectal Q6H PRN  polyethylene glycol (MIRALAX) packet 17 g  17 g Oral DAILY PRN  promethazine (PHENERGAN) tablet 12.5 mg  12.5 mg Oral Q6H PRN  Or  
 ondansetron (ZOFRAN) injection 4 mg  4 mg IntraVENous Q6H PRN  
  glucose chewable tablet 16 g  4 Tab Oral PRN  
 dextrose (D50W) injection syrg 12.5-25 g  25-50 mL IntraVENous PRN  
 glucagon (GLUCAGEN) injection 1 mg  1 mg IntraMUSCular PRN  
 insulin lispro (HUMALOG) injection   SubCUTAneous AC&HS  
 0.9% sodium chloride infusion 250 mL  250 mL IntraVENous PRN Care Plan discussed with: Patient/Family and Nurse Total time spent with patient and review of records: 30 minutes.  
 
Socorro Garcia MD

## 2020-10-02 NOTE — PROGRESS NOTES
1900: Bedside and Verbal shift change report given to Yue Morrell RN (oncoming nurse) by Charlie Kyle RN (offgoing nurse). Report included the following information SBAR, Kardex, ED Summary, Procedure Summary, Intake/Output, Recent Results, Cardiac Rhythm Sinus Rhythm and Quality Measures. Primary Nurse Ac Richardson RN and Charlie Marshall RN performed a dual skin assessment on this patient Impairment noted- see wound doc flow sheet Darrion score is 14. 
 
2000: Shift Assessment completed, see flowsheet. No s/s of distress noted at this time and patient denies pain. Mental Status: Alert and orientedx4 and able to follow simple commands. Respiratory: 2L NC with diminished lung sounds. Cardiac: Sinus Rhythm on the monitor. 90s GI/: Active bowel sounds noted at this time. IV Drips: Roland-synephrine gtt infusing at 15 mcg/min. Patient turned and repositioned at this time. 2118: Blood glucose-118. Medications administered at this time. 2200: Patient turned and repositioned at this time. 2236: Roland-synephrine gtt decreased to 10 mcg/min. 2248: Patient in A Fib her 140s. Cardizem gtt started at 2.5 mg/hr. 0000: Reassessment completed. No changes from previous assessment, see flowsheet. No s/s of distress noted. Patient turned and repositioned at this time. Roland-synephrine gtt decreased to 5 mcg/min at this time. 0038: Roland-synephrine gtt stopped at this time. 0091: Cardizem gtt stopped at this time. 0200: Patient turned and repositioned at this time. 0300: Patient refusing to allow this RN to obtain a blood pressure on the patient. Patient states Vern Dawkinss me alone lady, you are violating my rights as a patient to refuse treatment\". Charge Nurse notified at this time. 0400: Reassessment completed. No changes from previous assessment, see flowsheet noted at this time. No s/s of distress noted. Patient turned and repositioned at this time. 1526: Medications administered at this time. 0600: Patient turned and repositioned at this time. 0700: Bedside and Verbal shift change report given to Tera Yancey RN (oncoming nurse) by Sean Harris RN (offgoing nurse). Report included the following information SBAR, Kardex, ED Summary, Procedure Summary, Intake/Output, Recent Results, Cardiac Rhythm Sinus Rhythm and Quality Measures.

## 2020-10-02 NOTE — CONSULTS
[facility] Name: Christiano Cota MRN: [id number] : 1945 Hospital: [address] Date: 10/2/2020 Impression Plan 1. Hypotension 2. Afib wit RVR 3. Anemia, suspected GI bleed 4. CAD 5. HFpEF 6. PAD 7. HAYES on CPAP 
8. COPD 9. Chronic respiratory failure with hypoxia · Roland for a goal MAP greater than 65 · Dilt drip off; rate control as per cardiolgoy · Eliquis and ASA on hold due to concern for GI bleed · GI following, plan for scop once more stable · Will start Duonebs and Brovana/Pulmicort as she is on [place] on home and has no scheduled or prn bronchodilators · CPAP when sleeping, will order · Protonix · SCDs · Cardiac diet Radiology ( personally reviewed) CXR 2020: L basilar atelectasis, cardiomegaly (repeat CXR pending) ABG No results for input(s): PHI, PO2I, PCO2I in the last 72 hours. Subjective Patient is a 69 y.o. female with a history of CAD s/p PCI, HFpEF, afib, HTN, PAD s/p L fem pop, recurrent DVT, COPD, chronic respiratory failure with hypoxia, and HAYES who presented with syncope and found to have anemia with concern for GIB. Overnight she was transferred to the ICU for afib with RVR and hypotension. She initially required a dilt drip, now off. On roland at 28. Denies shortness of breath, chest pain, palpitations, dizziness. Review of Systems: A comprehensive review of systems was negative except for that written in the HPI. Past Medical History:  
Diagnosis Date  Asthma  Atrial fibrillation ([address] 2018  Autoimmune disease ([address]   
 fibromyalgia  CAD (coronary artery disease) 10/9/2015  Closed wedge compression fracture of T7 vertebra ([address] 2018  Diabetes ([address]  DVT (deep vein thrombosis) in pregnancy  GERD (gastroesophageal reflux disease)  Heart failure ([address]  History of vascular access device 2020 4f midline, 12cm at 1cm out placed in L Cephalic by Rey Zacarias RN  
 HTN (hypertension)  NSTEMI (non-ST elevated myocardial infarction) ([facility] 10/9/2015  Obesity (BMI 30-39.9) 11/13/2018  Sleep apnea   
 wears CPAP Past Surgical History:  
Procedure Laterality Date  ABDOMEN SURGERY PROC UNLISTED    
 hital hernia repair, gall bladder removed  AMPUTATION TRANSMETACARPAL Right 06/12/2019  
 entire ring finger, 2nd & 3rd fingers (transmetacarpal)  BREAST SURGERY PROCEDURE UNLISTED    
 lumpectomy  CARDIAC SURG PROCEDURE UNLIST  10/9/15  
 2 stents [address]  HX COLOSTOMY    
 and reversal, post episiotomy repair [address]  HX UROLOGICAL  2009  
 bladder sling Prior to Admission medications Medication Sig Start Date End Date Taking? Authorizing Provider  
tiotropium bromide (Spiriva Respimat) 2.5 mcg/actuation inhaler Take 2 Puffs by inhalation daily as needed. Yes Provider, Historical  
montelukast (Singulair) 10 mg tablet Take 10 mg by mouth daily. Yes Provider, Historical  
sAXagliptin (ONGLYZA) 5 mg tab tablet Take 5 mg by mouth daily. Yes Provider, Historical  
traMADoL (ULTRAM) 50 mg tablet Take 50 mg by mouth every six (6) hours as needed for Pain. Yes Provider, Historical  
metoprolol tartrate (LOPRESSOR) 25 mg tablet Take 25 mg by mouth daily as needed (For systolic >233). Yes Provider, Historical  
acetaminophen (TYLENOL) 325 mg tablet Take 1 Tab by mouth every four (4) hours as needed for Pain. 3/31/20  Yes Jenise Carter MD  
magnesium oxide (MAG-OX) 400 mg tablet Take 400 mg by mouth nightly. Yes Provider, Historical  
potassium chloride SR (KLOR-CON 10) 10 mEq tablet Take 10 mEq by mouth two (2) times a day. Yes Provider, Historical  
predniSONE (DELTASONE) 10 mg tablet Take 10 mg by mouth two (2) times daily (with meals).    Yes Provider, Historical  
 Omeprazole delayed release (PRILOSEC D/R) 20 mg tablet Take 20 mg by mouth two (2) times a day. Yes Provider, Historical  
ibandronate (BONIVA) 150 mg tablet Take 150 mg by mouth every thirty (30) days. Yes Provider, Historical  
promethazine (PHENERGAN) 25 mg tablet Take 25 mg by mouth every eight (8) hours as needed for Nausea (Nausea not relieved by ondansetron). Yes Provider, Historical  
mirtazapine (REMERON) 15 mg tablet Take 15 mg by mouth nightly. Yes Provider, Historical  
albuterol (PROVENTIL VENTOLIN) 2.5 mg /3 mL (0.083 %) nebulizer solution 2.5 mg by Nebulization route every six (6) hours as needed for Wheezing or Shortness of Breath. Yes Provider, Historical  
albuterol (PROAIR HFA) 90 mcg/actuation inhaler Take 2 Puffs by inhalation every four (4) hours as needed. Yes Provider, Historical  
lactobacillus rhamnosus gg 10 billion cell (CULTURELLE) 10 billion cell capsule Take 1 Cap by mouth daily. Yes Provider, Historical  
biotin 10,000 mcg cap Take 1 Cap by mouth daily. Yes Provider, Historical  
DULoxetine (CYMBALTA) 60 mg capsule Take 60 mg by mouth daily. Yes Provider, Historical  
cholecalciferol, vitamin D3, (Vitamin D3) 50 mcg (2,000 unit) tab Take 2,000 Units by mouth daily. Yes Provider, Historical  
azelastine-fluticasone (DYMISTA) 137-50 mcg/spray spry 2 Sprays by Nasal route daily as needed (allergies). Yes Provider, Historical  
mometasone-formoterol (DULERA) 200-5 mcg/actuation HFA inhaler Take 2 Puffs by inhalation two (2) times daily as needed. Yes Provider, Historical  
 
Current Facility-Administered Medications Medication Dose Route Frequency  PHENYLephrine (IVÁN-SYNEPHRINE) 30 mg in 0.9% sodium chloride 250 mL infusion   mcg/min IntraVENous TITRATE  potassium chloride SR (KLOR-CON 10) 10 mEq tablet  dilTIAZem (CARDIZEM) 100 mg in 0.9% sodium chloride (MBP/ADV) 100 mL infusion  0-15 mg/hr IntraVENous TITRATE  metoprolol tartrate (LOPRESSOR) tablet 12.5 mg  12.5 mg Oral Q6H  
 digoxin (LANOXIN) tablet 0.125 mg  0.125 mg Oral DAILY  influenza vaccine 2020-21 (6 mos+)(PF) (FLUARIX/FLULAVAL/FLUZONE QUAD) injection 0.5 mL  0.5 mL IntraMUSCular PRIOR TO DISCHARGE  mirtazapine (REMERON) tablet 15 mg  15 mg Oral QHS  DULoxetine (CYMBALTA) capsule 60 mg  60 mg Oral DAILY  montelukast (SINGULAIR) tablet 10 mg  10 mg Oral DAILY  pantoprazole (PROTONIX) 40 mg in 0.9% sodium chloride 10 mL injection  40 mg IntraVENous Q12H  
 [Held by provider] furosemide (LASIX) tablet 20 mg  20 mg Oral DAILY  sodium chloride (NS) flush 5-40 mL  5-40 mL IntraVENous Q8H  
 insulin lispro (HUMALOG) injection   SubCUTAneous AC&HS Allergies Allergen Reactions  Advair Diskus [Fluticasone Propion-Salmeterol] Rash  Aspartame Other (comments)  Breo Ellipta [Fluticasone Furoate-Vilanterol] Rash  Bumex [Bumetanide] Myalgia  Ciprofloxacin Rash  Statins-Hmg-Coa Reductase Inhibitors Other (comments) Muscle pain Lipitor/crestor/zocor  Sulfa (Sulfonamide Antibiotics) Rash  Tetracycline Other (comments) musclepain  Torsemide Rash and Myalgia  Zetia [Ezetimibe] Myalgia Social History Tobacco Use  Smoking status: Former Smoker Last attempt to quit: 3/30/2017 Years since quitting: 3.5  Smokeless tobacco: Never Used Substance Use Topics  Alcohol use: No  
  Alcohol/week: 0.0 standard drinks Comment: very very rarely Family History Problem Relation Age of Onset  Diabetes Mother  Heart Failure Mother  Kidney Disease Mother  Diabetes Father  Heart Disease Father  Elevated Lipids Father  Heart Failure Father  Heart Disease Brother  Cancer Brother   
     kidney  Diabetes Brother  No Known Problems Brother  No Known Problems Brother Laboratory: I have personally reviewed the critical care flowsheet and labs. Recent Labs 10/02/20 
0703 10/01/20 
2341 10/01/20 
1430 WBC 17.6* 14.0* 16.3* HGB 8.8* 6.7* 7.0*  
HCT 29.6* 24.1* 24.6*  252 268 Recent Labs 10/02/20 
0703 10/01/20 
2341 10/01/20 
0530  09/30/20 
1019 09/29/20 2027  142 141   < > 140 137  
K 3.5 3.6 3.5   < > 3.6 4.3  106 104   < > 105 103 CO2 29 30 32   < > 30 30 * 127* 167*   < > 115* 190* BUN 19 21* 27*   < > 27* 29* CREA 1.11* 1.09* 1.20*   < > 0.99 1.07* CA 7.4* 7.2* 7.6*   < > 7.6* 7.7* MG 2.0  --  2.0  --   --  2.2 ALB 1.9*  --  2.2*  --  2.2*  --   
ALT 36  --  40  --  38  --   
INR  --   --   --   --  1.1  --   
 < > = values in this interval not displayed. Objective: Mode Rate Tidal Volume Pressure FiO2 PEEP Vital Signs:   
 TMAX(24) Intake/Output:  
Last shift:        
Last 3 shifts: No intake/output data recorded. Spenser Tucker Intake/Output Summary (Last 24 hours) at 10/2/2020 1031 Last data filed at 10/2/2020 7628 Gross per 24 hour Intake 2631.99 ml Output 350 ml Net 2281.99 ml EXAM:  
GENERAL: well developed and in no distress, HEENT:  PERRL, EOMI, no alar flaring or epistaxis, oral mucosa moist without cyanosis, NECK:  no jugular vein distention LUNGS: CTAB, respirations non-labored, HEART:  Regular rate and rhythm with no MGR; 2+ edema is present, ABDOMEN:  soft with no tenderness, bowel sounds present, EXTREMITIES:  warm with no cyanosis, SKIN:  no jaundice or ecchymosis and NEUROLOGIC:  alert and oriented, grossly non-focal 
 
Migel Cortez MD 
[facility] [place]

## 2020-10-02 NOTE — PROGRESS NOTES
Problem: Risk for Spread of Infection Goal: Prevent transmission of infectious organism to others Description: Prevent the transmission of infectious organisms to other patients, staff members, and visitors. Outcome: Progressing Towards Goal 
  
Problem: Patient Education:  Go to Education Activity Goal: Patient/Family Education Outcome: Progressing Towards Goal 
  
Problem: Falls - Risk of 
Goal: *Absence of Falls Description: Document Franny Heller Fall Risk and appropriate interventions in the flowsheet. Outcome: Progressing Towards Goal 
Note: Fall Risk Interventions: 
Mobility Interventions: Assess mobility with egress test, Bed/chair exit alarm, Communicate number of staff needed for ambulation/transfer, Patient to call before getting OOB Medication Interventions: Bed/chair exit alarm, Patient to call before getting OOB, Teach patient to arise slowly Elimination Interventions: Bed/chair exit alarm, Call light in reach, Patient to call for help with toileting needs, Stay With Me (per policy) History of Falls Interventions: Bed/chair exit alarm, Investigate reason for fall, Room close to nurse's station Problem: Patient Education: Go to Patient Education Activity Goal: Patient/Family Education Outcome: Progressing Towards Goal 
  
Problem: Pressure Injury - Risk of 
Goal: *Prevention of pressure injury Description: Document Darrion Scale and appropriate interventions in the flowsheet. Outcome: Progressing Towards Goal 
Note: Pressure Injury Interventions: 
Sensory Interventions: Assess changes in LOC, Check visual cues for pain Moisture Interventions: Absorbent underpads, Apply protective barrier, creams and emollients, Check for incontinence Q2 hours and as needed Activity Interventions: Assess need for specialty bed Mobility Interventions: Turn and reposition approx. every two hours(pillow and wedges) Nutrition Interventions: Document food/fluid/supplement intake, Discuss nutritional consult with provider Friction and Shear Interventions: Apply protective barrier, creams and emollients Problem: Patient Education: Go to Patient Education Activity Goal: Patient/Family Education Outcome: Progressing Towards Goal 
  
Problem: Diabetes Self-Management Goal: *Disease process and treatment process Description: Define diabetes and identify own type of diabetes; list 3 options for treating diabetes. Outcome: Progressing Towards Goal 
Goal: *Incorporating nutritional management into lifestyle Description: Describe effect of type, amount and timing of food on blood glucose; list 3 methods for planning meals. Outcome: Progressing Towards Goal 
Goal: *Incorporating physical activity into lifestyle Description: State effect of exercise on blood glucose levels. Outcome: Progressing Towards Goal 
Goal: *Developing strategies to promote health/change behavior Description: Define the ABC's of diabetes; identify appropriate screenings, schedule and personal plan for screenings. Outcome: Progressing Towards Goal 
Goal: *Using medications safely Description: State effect of diabetes medications on diabetes; name diabetes medication taking, action and side effects. Outcome: Progressing Towards Goal 
Goal: *Monitoring blood glucose, interpreting and using results Description: Identify recommended blood glucose targets  and personal targets. Outcome: Progressing Towards Goal 
Goal: *Prevention, detection, treatment of acute complications Description: List symptoms of hyper- and hypoglycemia; describe how to treat low blood sugar and actions for lowering  high blood glucose level. Outcome: Progressing Towards Goal 
Goal: *Prevention, detection and treatment of chronic complications Description: Define the natural course of diabetes and describe the relationship of blood glucose levels to long term complications of diabetes.  
Outcome: Progressing Towards Goal 
 Goal: *Developing strategies to address psychosocial issues Description: Describe feelings about living with diabetes; identify support needed and support network Outcome: Progressing Towards Goal 
Goal: *Insulin pump training Outcome: Progressing Towards Goal 
Goal: *Sick day guidelines Outcome: Progressing Towards Goal 
Goal: *Patient Specific Goal (EDIT GOAL, INSERT TEXT) Outcome: Progressing Towards Goal 
  
Problem: Patient Education: Go to Patient Education Activity Goal: Patient/Family Education Outcome: Progressing Towards Goal

## 2020-10-02 NOTE — PROGRESS NOTES
0700 bedside shift report received from [facility]. Dual Skin performed. 26 Dilt Started per orders for afib rvr 
0715 shift assessment performed, 
0740 dilt gtt titrated up 0800 turned by turn team 
0810 Dilt gtt titrated up [id number] NP at bedside to assess. Verbal received to start weaning dilt gtt down as patient looks to be converted into Sinus, titrate jimenez down for SBP greater than 100. Turn IVF to Myrene Boots, get patient to eat and drink for hydration purposes. Will get chest xray. Patient on 2L no SOB or difficulty with breathing. Home cpap at bedside. Pain 5/10 in back and pain medication given and patient repositioned 9570 Patient had small bowel movement, Patient cleaned, new chux applied to bed, purewick changed and lindsay care performed. Zinc cream applied to bottom where moisture redness was noted but blanchable. Patient on Turn team. Patient refused to sit up to 30 degrees due to back pain. Breakfast tray at bedside, patient has call bell and instructed to call when ready to sit up past 30 degrees (aspiration precautions) to eat breakfast.  
1100 Report Given to [facility] for progression of care. Patient remains in SR with PAC, off dilt gtt, jimenez being weaned per order

## 2020-10-02 NOTE — ROUTINE PROCESS
1915 
Verified patient is on box 336. 
 
2230 Spoke with Dr. Stephany Flores regarding patient blood pressure. Order for bolus then maintenance fluids. [place] 30 Notified Dr. Stephany Flores of patient blood pressure. Highest SBP is in the 70's. Reviewed recent Hgb. New orders received. Patient is lethargic which is what I also received in report. Easily aroused and is alert and oriented. Will have a conversation but quickly goes back to sleep. 300 [facility] RRT called due to blood pressure. 2801 [facility] Labs resulted. Notified and reviewed with Dr. Stephany Flores. Order for jimenez to start at 5 and titrate for SBP above 85 and 1 unit of blood. 1886 Patient only has a single lumen midline due to limited access. Nurse in ICU had failed attempts at an IV. Normal Saline is on hold for jimenez. [address] [place]  Spoke with Dr. Stephany Flores to let him know about the access. Said to administer blood first then restart the jimenez. No change in patient assessment. 8319 Blood completed and restarted jimenez. Minimal improvement in blood pressure with 1 unit of blood given. Highest SBP was in the 80's. 1664 RRT called again on patient due to blood pressures. Adjusting jimenez per order with minimal response in blood pressure. Blood pressure been taken on right lower arm. Switched to left lower arm with a better reading. Switched back to right upper arm with a similar reading as left arm. However taking the blood pressure in the upper arm is painful to the patient. Dr. Severa Foerster and nurse from ED responded. Patient is alert and oriented. Asymptomatic.   
 
9467 Patient heart rate in the 160's. Called RRT. Heart rate then went back down to low 100's. Cancelled RRT then heart rate went back up to 150's-160's. ICU responded. EKG showed A Fib RVR. Patient states she feels the flutter in her chest. 
 
8257 Patient heart rate is now 91. Patient states her chest feels better. 3810 Heart rate still going up and down. Spoke with Dr. Emerita Carroll. Advised to restart cardizem at 2.5 and to keep MAPS above 65. Will transfer to ICU.   
 
0700 Patient belonging including phone and  was transferred to ICU. Repeat labs post transfusion sent to lab. Bedside and Verbal shift change report given to Sai Buchanan (oncoming nurse) by Kelly Benites (offgoing nurse). Report included the following information SBAR, Kardex, ED Summary, Procedure Summary, Intake/Output, MAR, Recent Results and Cardiac Rhythm A Fib.

## 2020-10-02 NOTE — PROGRESS NOTES
Daily Progress Note: 10/2/2020 Mikayla Tang PCP: 
Loren Fuentes MD 
 
Assessment/Plan:  
Acute Syncopal Episode - likely due to low BP from anemia 
- monitor/tx as needed CAD- stable CHF 
- Diuretics as needed Chronic A-fib with acute exacerbations of RVR 
- Holding Eliquis - Cards consult - Metoprolol Acute on chronic anemia, with hemorrhagic shock 10/1/20 
- Transfuse as needed; Roland as needed to support BP 
- GI consult - colonoscopy planned at some point - currently on hold - PPI Dehydration 
- IVF 
 
DM2 
- SSI, Monitor BG ac and hs 
 
HAYES - CPAP Depression/Anxiety - Remeron and Cymbata Nondisplaced acute fracture L3 superior endplate. 
- poss kyphoplasty when more stable - Ortho has seen Problem List: 
Problem List as of 10/2/2020 Date Reviewed: 5/29/2020 Codes Class Noted - Resolved Syncope and collapse ICD-10-CM: R55 
ICD-9-CM: 780.2  9/29/2020 - Present Cellulitis of lower extremity ICD-10-CM: L03.119 ICD-9-CM: 682.6  3/23/2020 - Present ASO (arteriosclerosis obliterans) ICD-10-CM: I70.90 ICD-9-CM: 440.9  9/5/2019 - Present PVD (peripheral vascular disease) ([facility] ICD-10-CM: I73.9 ICD-9-CM: 443.9  8/1/2019 - Present Severe obesity ([facility] ICD-10-CM: E66.01 
ICD-9-CM: 278.01  7/30/2019 - Present UTI (urinary tract infection) ICD-10-CM: N39.0 ICD-9-CM: 599.0  7/13/2019 - Present COPD (chronic obstructive pulmonary disease) ([facility]) ICD-10-CM: J44.9 ICD-9-CM: 297  7/13/2019 - Present Sepsis ([facility] ICD-10-CM: A41.9 ICD-9-CM: 038.9, 995.91  7/13/2019 - Present Normocytic anemia ICD-10-CM: D64.9 ICD-9-CM: 285.9  7/13/2019 - Present PAD (peripheral artery disease) ([facility]) ICD-10-CM: I73.9 ICD-9-CM: 443.9  7/13/2019 - Present Mild intermittent asthma ICD-10-CM: J45.20 ICD-9-CM: 493.90  5/17/2019 - Present  Gangrene of finger of right hand ([facility] ICD-10-CM: L37 
 ICD-9-CM: 785.4  5/17/2019 - Present Brachial artery thrombus (HCC) ICD-10-CM: N98.4 ICD-9-CM: 444.21  4/26/2019 - Present Sleep apnea ICD-10-CM: G47.30 ICD-9-CM: 780.57  1/5/2019 - Present Overview Signed 1/5/2019  8:41 PM by Cr Valle, DO  
  wears CPAP Atrial fibrillation [facility]) ICD-10-CM: I48.91 
ICD-9-CM: 427.31  11/16/2018 - Present Obesity (BMI 30-39.9) (Chronic) ICD-10-CM: I52.6 ICD-9-CM: 278.00  11/13/2018 - Present Type 2 diabetes with nephropathy ([facility] ICD-10-CM: E11.21 
ICD-9-CM: 250.40, 583.81  10/1/2018 - Present Recurrent deep vein thrombosis (DVT) (HCC) ICD-10-CM: I82.409 ICD-9-CM: 453.40  3/30/2018 - Present Chronic anticoagulation (Chronic) ICD-10-CM: Z79.01 
ICD-9-CM: V58.61  3/30/2018 - Present Coronary artery disease involving native coronary artery without angina pectoris (Chronic) ICD-10-CM: I25.10 ICD-9-CM: 414.01  1/22/2016 - Present Essential hypertension (Chronic) ICD-10-CM: I10 
ICD-9-CM: 401.9  1/22/2016 - Present CAD (coronary artery disease) ICD-10-CM: I25.10 ICD-9-CM: 414.00  10/9/2015 - Present Type II diabetes mellitus (HCC) (Chronic) ICD-10-CM: E11.9 ICD-9-CM: 250.00  10/9/2015 - Present RESOLVED: Cellulitis ICD-10-CM: L03.90 ICD-9-CM: 682.9  3/23/2020 - 5/29/2020 RESOLVED: Hypokalemia ICD-10-CM: E87.6 ICD-9-CM: 276.8  3/23/2020 - 5/29/2020 RESOLVED: Hyponatremia ICD-10-CM: E87.1 ICD-9-CM: 276.1  3/23/2020 - 5/29/2020 RESOLVED: Diarrhea ICD-10-CM: R19.7 ICD-9-CM: 787.91  3/23/2020 - 5/29/2020 RESOLVED: Syncope and collapse ICD-10-CM: R55 
ICD-9-CM: 780.2  7/13/2019 - 5/29/2020 RESOLVED: Leg wound, left ICD-10-CM: F91.549G ICD-9-CM: 891.0  7/13/2019 - 5/29/2020 RESOLVED: Leg laceration, right, initial encounter ICD-10-CM: B73.314T ICD-9-CM: 894.0  7/13/2019 - 5/29/2020 RESOLVED: Cellulitis of right upper arm ICD-10-CM: L03.113 ICD-9-CM: 682.3  5/17/2019 - 5/29/2020 RESOLVED: Bowel obstruction ([facility] ICD-10-CM: M50.945 ICD-9-CM: 560.9  1/8/2019 - 5/29/2020 RESOLVED: Partial small bowel obstruction ([facility] ICD-10-CM: K56.600 ICD-9-CM: 560.9  1/5/2019 - 5/29/2020 RESOLVED: Dyspnea ICD-10-CM: R06.00 
ICD-9-CM: 786.09  11/13/2018 - 5/29/2020 RESOLVED: ARF (acute renal failure) (HCC) ICD-10-CM: N17.9 ICD-9-CM: 584.9  11/13/2018 - 5/29/2020 RESOLVED: Closed wedge compression fracture of T7 vertebra ([facility] ICD-10-CM: Z34.204V ICD-9-CM: 805.2  11/13/2018 - 5/29/2020 RESOLVED: Chest pain ICD-10-CM: R07.9 ICD-9-CM: 786.50  6/27/2018 - 5/29/2020 RESOLVED: Abdominal pain ICD-10-CM: R10.9 ICD-9-CM: 789.00  6/27/2018 - 5/29/2020 RESOLVED: HTN (hypertension) ICD-10-CM: I10 
ICD-9-CM: 401.9  10/9/2015 - 1/22/2016 RESOLVED: NSTEMI (non-ST elevated myocardial infarction) ([facility] ICD-10-CM: I21.4 ICD-9-CM: 410.70  10/9/2015 - 5/29/2020 HPI:  
 42-year-old female with multiple medical problems including obesity, sleep apnea, non-ST-elevation myocardial infarction, primary hypertension, congestive heart failure, gastroesophageal reflux disease, type 2 diabetes mellitus, temporal arteritis, fibromyalgia, atrial fibrillation and asthma, was brought to the emergency room from home for complaints of lightheadedness, dizziness with subsequent fall and low back pain. Patient stated that whilst preparing some dinner in the evening hours of 09/29/2020, had the onset of symptoms with subsequent fall in a sitting position. Thereafter, patient noted persistent low back pain. Patient denied any recent changes in medication. Denied headaches, visual deficits, chest pain, palpitations, sore throat, cough, shortness of breath, nausea, vomiting, fevers, chills, abdominal pain, bladder or bowel irregularities. (Dr Michelle Lee) : She is c/o pain in her back. Meds are helping. Hb 5.1 and she has been transfused with 2 units. AM labs pending and she will need a line as she is a difficult stick. May need more blood. She reports her stools have been dark so will ask GI to see. Holding Eliquis. She is on chronic steroids due to TA.  
 
10/1:  Heart rate up to 180s this AM - seems to have spiked up due to back pain but now back in 110s. No other complaint except back pain. No chest pain or SOB. Hb 7.1 this AM - watching. GI planning colonoscopy - starting prep. Poss kyphoplasty when more stable. 10/2:  Reports she feels \"just bad all over\" this AM.  Last night became hypotensive, dropped Hb again, required another transfusion of 1 unit, and Roland had to be started to maintain BP - remains on Roland this AM.  Also mult episodes of RVR of her A fib - Cards treating and Dilt drip started. Transferred to ICU. Hb up to 8.8 this AM.  Tmax 99. WBC up a little from previous -watching. Review of Systems: A comprehensive review of systems was negative except for that written in the HPI. Objective:  
Physical Exam:  
Visit Vitals /71 Pulse (!) 172 Temp 98.2 °F (36.8 °C) Resp 19 Ht 5' 7\" (1.702 m) Wt 115.7 kg (255 lb) SpO2 97% BMI 39.94 kg/m² O2 Flow Rate (L/min): 3 l/min O2 Device: Nasal cannula Temp (24hrs), Av.9 °F (36.6 °C), Min:97.4 °F (36.3 °C), Max:99.1 °F (37.3 °C) No intake/output data recorded.  1901 - 10/02 0700 In: 2932 [P.O.:500; I.V.:2020.7] Out: 1350 [ZCEWE:[id number]] General:  Alert, cooperative, no distress, obese Head:  Normocephalic, without obvious abnormality, atraumatic. Eyes:  Conjunctivae/corneas clear. PERRL, EOMs intact. Nose: Nares normal. Septum midline. Mucosa normal. No drainage or sinus tenderness.   
Throat: Lips, mucosa, and tongue normal. Teeth and gums normal.  
Neck: Supple, symmetrical, trachea midline, no adenopathy, thyroid: no enlargement/tenderness/nodules, no carotid bruit and no JVD. Back:   Symmetric, no curvature. ROM normal. No CVA tenderness. Lungs:   Clear to auscultation bilaterally. Chest wall:  No tenderness or deformity. Heart:  Irregular rhythm, tachy rate, no murmur, click, rub or gallop. Abdomen:   Soft, non-tender. Bowel sounds normal. No masses,  No organomegaly. Extremities: Extremities with weeping and swelling, mildly erythematous, dressed. No calf tenderness or cords. Pulses: 1+ and symmetric all extremities. Skin: Skin color, texture, turgor normal.   
Neurologic: CNII-XII intact. Alert and oriented X 3. Fine motor of hands and fingers normal.   equal.  No cogwheeling or rigidity. Gait not tested at this time. Sensation grossly normal to touch. Gross motor of extremities normal.    
 
Data Review:  
CT Head IMPRESSION: No acute findings. EXAM:  CT CERVICAL SPINE WITHOUT CONTRAST INDICATION: fall w head injury. COMPARISON: None. CONTRAST:  None. FINDINGS: 
Alignment is satisfactory, no acute fracture or dislocation. Moderate spondylosis and disc degeneration at C5-6 IMPRESSION 
 impression: No acute changes. CT LS spine Axial CT scan of the lumbar sacral spine obtained without contrast with coronal 
and sagittal reconstructions. No prior. CT dose reduction was achieved through 
the use of a standardized protocol tailored for this examination and automatic 
exposure control for dose modulation . Osie Ra There is a minimally displaced fracture of the superior endplate of L3 without 
significant canal compromise. There is some mild diffuse spondylosis and disc degeneration. IMPRESSION 
 impression: Nondisplaced acute fracture L3 superior endplate. Recent Days: 
Recent Labs 10/02/20 
0703 10/01/20 
2341 10/01/20 
1430 WBC 17.6* 14.0* 16.3* HGB 8.8* 6.7* 7.0*  
HCT 29.6* 24.1* 24.6*  252 268 Recent Labs 10/02/20 
0703 10/01/20 
2341 10/01/20 0530  09/30/20 
1019 09/29/20 2027  142 141   < > 140 137  
K 3.5 3.6 3.5   < > 3.6 4.3  106 104   < > 105 103 CO2 29 30 32   < > 30 30 * 127* 167*   < > 115* 190* BUN 19 21* 27*   < > 27* 29* CREA 1.11* 1.09* 1.20*   < > 0.99 1.07* CA 7.4* 7.2* 7.6*   < > 7.6* 7.7* MG  --   --  2.0  --   --  2.2 ALB 1.9*  --  2.2*  --  2.2*  --   
TBILI 2.5*  --  3.2*  --  1.8*  --   
ALT 36  --  40  --  38  --   
INR  --   --   --   --  1.1  --   
 < > = values in this interval not displayed. 24 Hour Results: 
Recent Results (from the past 24 hour(s)) GLUCOSE, POC Collection Time: 10/01/20  8:00 AM  
Result Value Ref Range Glucose (POC) 164 (H) 65 - 100 mg/dL Performed by Alix Rivers GLUCOSE, POC Collection Time: 10/01/20 11:05 AM  
Result Value Ref Range Glucose (POC) 146 (H) 65 - 100 mg/dL Performed by Marzella Habermann GLUCOSE, POC Collection Time: 10/01/20  2:26 PM  
Result Value Ref Range Glucose (POC) 140 (H) 65 - 100 mg/dL Performed by Fady Hill   
CBC WITH AUTOMATED DIFF Collection Time: 10/01/20  2:30 PM  
Result Value Ref Range WBC 16.3 (H) 3.6 - 11.0 K/uL  
 RBC 2.93 (L) 3.80 - 5.20 M/uL HGB 7.0 (L) 11.5 - 16.0 g/dL HCT 24.6 (L) 35.0 - 47.0 % MCV 84.0 80.0 - 99.0 FL  
 MCH 23.9 (L) 26.0 - 34.0 PG  
 MCHC 28.5 (L) 30.0 - 36.5 g/dL  
 RDW 17.6 (H) 11.5 - 14.5 % PLATELET 693 500 - 896 K/uL MPV 10.3 8.9 - 12.9 FL  
 NRBC 0.6 (H) 0  WBC ABSOLUTE NRBC 0.10 (H) 0.00 - 0.01 K/uL NEUTROPHILS 80 (H) 32 - 75 % LYMPHOCYTES 9 (L) 12 - 49 % MONOCYTES 7 5 - 13 % EOSINOPHILS 2 0 - 7 % BASOPHILS 0 0 - 1 % IMMATURE GRANULOCYTES 2 (H) 0.0 - 0.5 % ABS. NEUTROPHILS 13.1 (H) 1.8 - 8.0 K/UL  
 ABS. LYMPHOCYTES 1.5 0.8 - 3.5 K/UL  
 ABS. MONOCYTES 1.1 (H) 0.0 - 1.0 K/UL  
 ABS. EOSINOPHILS 0.3 0.0 - 0.4 K/UL  
 ABS. BASOPHILS 0.0 0.0 - 0.1 K/UL  
 ABS. IMM.  GRANS. 0.3 (H) 0.00 - 0.04 K/UL  
 DF SMEAR SCANNED    
 RBC COMMENTS ANISOCYTOSIS 1+ 
    
 RBC COMMENTS HYPOCHROMIA 2+ WBC COMMENTS TOXIC GRANULATION    
GLUCOSE, POC Collection Time: 10/01/20  4:36 PM  
Result Value Ref Range Glucose (POC) 120 (H) 65 - 100 mg/dL Performed by Shelia Cooper (PCT) GLUCOSE, POC Collection Time: 10/01/20 10:30 PM  
Result Value Ref Range Glucose (POC) 128 (H) 65 - 100 mg/dL Performed by Zohra Garcia CBC WITH AUTOMATED DIFF Collection Time: 10/01/20 11:41 PM  
Result Value Ref Range WBC 14.0 (H) 3.6 - 11.0 K/uL  
 RBC 2.83 (L) 3.80 - 5.20 M/uL HGB 6.7 (L) 11.5 - 16.0 g/dL HCT 24.1 (L) 35.0 - 47.0 % MCV 85.2 80.0 - 99.0 FL  
 MCH 23.7 (L) 26.0 - 34.0 PG  
 MCHC 27.8 (L) 30.0 - 36.5 g/dL  
 RDW 18.1 (H) 11.5 - 14.5 % PLATELET 285 675 - 490 K/uL MPV 10.2 8.9 - 12.9 FL  
 NRBC 0.2 (H) 0  WBC ABSOLUTE NRBC 0.03 (H) 0.00 - 0.01 K/uL NEUTROPHILS 80 (H) 32 - 75 % LYMPHOCYTES 9 (L) 12 - 49 % MONOCYTES 7 5 - 13 % EOSINOPHILS 2 0 - 7 % BASOPHILS 0 0 - 1 % IMMATURE GRANULOCYTES 2 (H) 0.0 - 0.5 % ABS. NEUTROPHILS 11.2 (H) 1.8 - 8.0 K/UL  
 ABS. LYMPHOCYTES 1.2 0.8 - 3.5 K/UL  
 ABS. MONOCYTES 1.0 0.0 - 1.0 K/UL  
 ABS. EOSINOPHILS 0.3 0.0 - 0.4 K/UL  
 ABS. BASOPHILS 0.0 0.0 - 0.1 K/UL  
 ABS. IMM. GRANS. 0.3 (H) 0.00 - 0.04 K/UL  
 DF AUTOMATED METABOLIC PANEL, BASIC Collection Time: 10/01/20 11:41 PM  
Result Value Ref Range Sodium 142 136 - 145 mmol/L Potassium 3.6 3.5 - 5.1 mmol/L Chloride 106 97 - 108 mmol/L  
 CO2 30 21 - 32 mmol/L Anion gap 6 5 - 15 mmol/L Glucose 127 (H) 65 - 100 mg/dL BUN 21 (H) 6 - 20 MG/DL Creatinine 1.09 (H) 0.55 - 1.02 MG/DL  
 BUN/Creatinine ratio 19 12 - 20 GFR est AA 60 (L) >60 ml/min/1.73m2 GFR est non-AA 49 (L) >60 ml/min/1.73m2 Calcium 7.2 (L) 8.5 - 10.1 MG/DL  
RBC, ALLOCATE Collection Time: 10/02/20 12:15 AM  
Result Value Ref Range HISTORY CHECKED? Historical check performed METABOLIC PANEL, COMPREHENSIVE Collection Time: 10/02/20  7:03 AM  
Result Value Ref Range Sodium 142 136 - 145 mmol/L Potassium 3.5 3.5 - 5.1 mmol/L Chloride 106 97 - 108 mmol/L  
 CO2 29 21 - 32 mmol/L Anion gap 7 5 - 15 mmol/L Glucose 137 (H) 65 - 100 mg/dL BUN 19 6 - 20 MG/DL Creatinine 1.11 (H) 0.55 - 1.02 MG/DL  
 BUN/Creatinine ratio 17 12 - 20 GFR est AA 59 (L) >60 ml/min/1.73m2 GFR est non-AA 48 (L) >60 ml/min/1.73m2 Calcium 7.4 (L) 8.5 - 10.1 MG/DL Bilirubin, total 2.5 (H) 0.2 - 1.0 MG/DL  
 ALT (SGPT) 36 12 - 78 U/L  
 AST (SGOT) 27 15 - 37 U/L Alk. phosphatase 168 (H) 45 - 117 U/L Protein, total 4.5 (L) 6.4 - 8.2 g/dL Albumin 1.9 (L) 3.5 - 5.0 g/dL Globulin 2.6 2.0 - 4.0 g/dL A-G Ratio 0.7 (L) 1.1 - 2.2    
CBC WITH AUTOMATED DIFF Collection Time: 10/02/20  7:03 AM  
Result Value Ref Range WBC 17.6 (H) 3.6 - 11.0 K/uL  
 RBC 3.45 (L) 3.80 - 5.20 M/uL HGB 8.8 (L) 11.5 - 16.0 g/dL HCT 29.6 (L) 35.0 - 47.0 % MCV 85.8 80.0 - 99.0 FL  
 MCH 25.5 (L) 26.0 - 34.0 PG  
 MCHC 29.7 (L) 30.0 - 36.5 g/dL  
 RDW 18.1 (H) 11.5 - 14.5 % PLATELET 889 447 - 379 K/uL MPV 9.8 8.9 - 12.9 FL  
 NRBC 0.4 (H) 0  WBC ABSOLUTE NRBC 0.07 (H) 0.00 - 0.01 K/uL NEUTROPHILS 82 (H) 32 - 75 % LYMPHOCYTES 7 (L) 12 - 49 % MONOCYTES 7 5 - 13 % EOSINOPHILS 2 0 - 7 % BASOPHILS 0 0 - 1 % IMMATURE GRANULOCYTES 2 (H) 0.0 - 0.5 % ABS. NEUTROPHILS 14.4 (H) 1.8 - 8.0 K/UL  
 ABS. LYMPHOCYTES 1.2 0.8 - 3.5 K/UL  
 ABS. MONOCYTES 1.2 (H) 0.0 - 1.0 K/UL  
 ABS. EOSINOPHILS 0.4 0.0 - 0.4 K/UL  
 ABS. BASOPHILS 0.0 0.0 - 0.1 K/UL  
 ABS. IMM. GRANS. 0.4 (H) 0.00 - 0.04 K/UL  
 DF SMEAR SCANNED    
 RBC COMMENTS ANISOCYTOSIS 1+ 
    
 RBC COMMENTS POLYCHROMASIA 1+ 
    
 RBC COMMENTS HYPOCHROMIA 1+ WBC COMMENTS REACTIVE LYMPHS Medications reviewed Current Facility-Administered Medications Medication Dose Route Frequency  0.9% sodium chloride infusion 250 mL  250 mL IntraVENous PRN  
 PHENYLephrine (IVÁN-SYNEPHRINE) 30 mg in 0.9% sodium chloride 250 mL infusion   mcg/min IntraVENous TITRATE  dilTIAZem (CARDIZEM) 100 mg in 0.9% sodium chloride (MBP/ADV) 100 mL infusion  0-15 mg/hr IntraVENous TITRATE  metoprolol tartrate (LOPRESSOR) tablet 12.5 mg  12.5 mg Oral Q6H  
 digoxin (LANOXIN) tablet 0.125 mg  0.125 mg Oral DAILY  0.9% sodium chloride infusion  75 mL/hr IntraVENous CONTINUOUS  
 influenza vaccine 2020-21 (6 mos+)(PF) (FLUARIX/FLULAVAL/FLUZONE QUAD) injection 0.5 mL  0.5 mL IntraMUSCular PRIOR TO DISCHARGE  metoprolol tartrate (LOPRESSOR) tablet 25 mg  25 mg Oral DAILY PRN  
 mirtazapine (REMERON) tablet 15 mg  15 mg Oral QHS  DULoxetine (CYMBALTA) capsule 60 mg  60 mg Oral DAILY  montelukast (SINGULAIR) tablet 10 mg  10 mg Oral DAILY  pantoprazole (PROTONIX) 40 mg in 0.9% sodium chloride 10 mL injection  40 mg IntraVENous Q12H  
 HYDROcodone-acetaminophen (NORCO) 5-325 mg per tablet 1 Tab  1 Tab Oral Q4H PRN  
 [Held by provider] furosemide (LASIX) tablet 20 mg  20 mg Oral DAILY  morphine injection 1 mg  1 mg IntraVENous Q6H PRN  
 0.9% sodium chloride infusion 250 mL  250 mL IntraVENous PRN  
 sodium chloride (NS) flush 5-40 mL  5-40 mL IntraVENous Q8H  
 sodium chloride (NS) flush 5-40 mL  5-40 mL IntraVENous PRN  
 acetaminophen (TYLENOL) tablet 650 mg  650 mg Oral Q6H PRN Or  
 acetaminophen (TYLENOL) suppository 650 mg  650 mg Rectal Q6H PRN  polyethylene glycol (MIRALAX) packet 17 g  17 g Oral DAILY PRN  promethazine (PHENERGAN) tablet 12.5 mg  12.5 mg Oral Q6H PRN  Or  
 ondansetron (ZOFRAN) injection 4 mg  4 mg IntraVENous Q6H PRN  
 glucose chewable tablet 16 g  4 Tab Oral PRN  
 dextrose (D50W) injection syrg 12.5-25 g  25-50 mL IntraVENous PRN  
 glucagon (GLUCAGEN) injection 1 mg  1 mg IntraMUSCular PRN  
  insulin lispro (HUMALOG) injection   SubCUTAneous AC&HS  
 0.9% sodium chloride infusion 250 mL  250 mL IntraVENous PRN Care Plan discussed with: Patient/Family and Nurse Total time spent with patient and review of records: 30 minutes.  
 
Diana Reddy MD

## 2020-10-02 NOTE — PROGRESS NOTES
Physician Progress Note Jersey De La Cruz 
CSN #:                  [id number] :                       1945 ADMIT DATE:       2020 6:41 PM 
[address] DATE: 
RESPONDING 
PROVIDER #:        Amberly Gibbs MD 
 
 
 
 
QUERY TEXT: 
 
Dear [facility] Team, 
Pt admitted with syncope with noted severe anemia. Pt noted to go into Afib with RVR with subsequent hypotension in need of ICU transfer and started on Cardizem gtt and Phenylephrine gtt for BP support and rhythm management overnight. Patient continues on 40 mcg Phenylephrine gtt for vasopressor support. If possible, please document in the progress notes and discharge summary if you are evaluating and/or treating any of the following: The medical record reflects the following: 
 
Risk Factors: 69 Yr F admitted with anemia and Syncope Clinical Indicators: Patient being worked up for syncope and severe anemia. On 10/1 patient experienced  and EKG showed Afib with RVR. Patient transferred to step down with cardiology contacted and was started on Cardizem gtt for HR control. Patient noted to have a Hgb of 6.7 and 1 unit of PRBC was ordered and transfused making a total of 3 units PRBC since admission. Patient became hypotensive to BP readings of  64/22, 75/51 with a HR  when not in Afib with a WBC count of 17.6. Patient was started on a Phenylephrine gtt and transferred to the ICU. Patient now receiving NS IVF at 75 ML/HR with Phenylephrine gtt at 40 mcg. Patient also received a small 250 ML NS IVF bolus. Treatment: CBC, 1 unit PRBC, transfer to step down and ICU, Cardizem gtt initiation, Phenylephrine gtt initiation, cardiology contacted, frequent monitoring/vital signs. Thank you, Jesus Ruiz Valentin [employer] Options provided: 
-- Cardiogenic Shock -- Hemorrhagic Shock 
-- Septic Shock -- Hypovolemic Shock -- Hypovolemia without Shock -- Hypotension without Shock -- Other - I will add my own diagnosis -- Disagree - Not applicable / Not valid -- Disagree - Clinically unable to determine / Unknown 
-- Refer to Clinical Documentation Reviewer PROVIDER RESPONSE TEXT: 
 
Added to A/P -- acute on chronic blood loss with hemorrhagic shock 10/1 Query created by:  Corey Klinefelter on 10/2/2020 8:51 AM 
 
 
Electronically signed by:  Bishnu Washington MD 10/2/2020 9:51 AM

## 2020-10-02 NOTE — PROGRESS NOTES
Problem: Mobility Impaired (Adult and Pediatric) Goal: *Acute Goals and Plan of Care (Insert Text) Description: FUNCTIONAL STATUS PRIOR TO ADMISSION: Patient was modified independent using a rollator for functional mobility. Patient reports short distances only and requires lots of seated breaks on rollator. HOME SUPPORT PRIOR TO ADMISSION: The patient lived alone. Daughter is nearby. Reports she calls for security at [facility] when she needs help. Per pt report her and her daughter are looking into hiring private care aides for when she discharges. Physical Therapy Goals Initiated 10/2/2020 1. Patient will move from supine to sit and sit to supine , scoot up and down, and roll side to side in bed with moderate assistance  within 7 day(s). 2.  Patient will transfer from bed to chair and chair to bed with moderate assistance  using the least restrictive device within 7 day(s). 3.  Patient will perform sit to stand with moderate assistance  within 7 day(s). 4.  Patient will ambulate with moderate assistance  for 15 feet with the least restrictive device within 7 day(s). Outcome: Progressing Towards Goal 
 PHYSICAL THERAPY EVALUATION Patient: Tashi Perkins (35 y.o. female) Date: 10/2/2020 Primary Diagnosis: Syncope and collapse [R55] Procedure(s) (LRB): ESOPHAGOGASTRODUODENOSCOPY (EGD) (N/A) COLONOSCOPY (N/A) Precautions:   Fall, Back, Skin ASSESSMENT Based on the objective data described below, the patient presents with decreased ROM and strength with increased pain in LE and back following admission for dizziness and fall at [facility]. Pt with acute L3 compression fracture and hospital course complicated by Afib with RVR, hypotension and anemia. Pt with frequent admissions ~ every 3 months over the past two years. Patient with self-limiting behaviors, very resistant to movement and significantly deconditioned.  On baseline 3L/min supplemental O2 at home. Today pt only agreeable to bed level exercises and rolling. Maximal assist for rolling with better tolerance rolling to the L. Pt engaged in LE/UE exercises. Increased pain in back with LLE knee flexion. Pt adamant she can not sit up on the EOB today or tolerate bed in chair position. Resting in supine with HOB ~15 degrees crying out in pain with any additional elevation. Pt declining LSO and awaiting kyphoplasty when medically stable. At this time recommending OT consult as well as discharge to SNF. Current Level of Function Impacting Discharge (mobility/balance): Max A to roll Functional Outcome Measure: The patient scored 0/28 on the Tinetti outcome measure which is indicative of high fall risk. Other factors to consider for discharge: lives alone in [address] [place] [address], multiple readmissons Patient will benefit from skilled therapy intervention to address the above noted impairments. PLAN : 
Recommendations and Planned Interventions: bed mobility training, transfer training, gait training, therapeutic exercises, neuromuscular re-education, edema management/control, patient and family training/education, and therapeutic activities Frequency/Duration: Patient will be followed by physical therapy:  5 times a week to address goals. Recommendation for discharge: (in order for the patient to meet his/her long term goals) Therapy up to 5 days/week in SNF setting This discharge recommendation: 
Has been made in collaboration with the attending provider and/or case management IF patient discharges home will need the following DME: patient owns DME required for discharge SUBJECTIVE:  
Patient stated Ferd Kawasaki are going to find a private care aide to help out after this.  OBJECTIVE DATA SUMMARY:  
HISTORY:   
Past Medical History:  
Diagnosis Date Asthma Atrial fibrillation ([facility] [address] [facility] 11/16/2018 Autoimmune disease ([facility]   
 fibromyalgia CAD (coronary artery disease) 10/9/2015 Closed wedge compression fracture of T7 vertebra ([facility] [address] 11/13/2018 Diabetes ([facility] [address] [facility] DVT (deep vein thrombosis) in pregnancy GERD (gastroesophageal reflux disease) Heart failure ([facility] [address] History of vascular access device 09/30/2020  
 4f midline, 12cm at 1cm out placed in L Cephalic by Suyapa Sebastian RN  
 HTN (hypertension) NSTEMI (non-ST elevated myocardial infarction) ([facility] [address] 10/9/2015 Obesity (BMI 30-39.9) 11/13/2018 Sleep apnea   
 wears CPAP Past Surgical History:  
Procedure Laterality Date ABDOMEN SURGERY PROC UNLISTED    
 hital hernia repair, gall bladder removed AMPUTATION TRANSMETACARPAL Right 06/12/2019  
 entire ring finger, 2nd & 3rd fingers (transmetacarpal) BREAST SURGERY PROCEDURE UNLISTED    
 lumpectomy CARDIAC SURG PROCEDURE UNLIST  10/9/15  
 2 stents [facility] 64 HX COLOSTOMY    
 and reversal, post episiotomy repair [address] [facility] UROLOGICAL  2009  
 bladder sling Personal factors and/or comorbidities impacting plan of care: prior compression fractures, A-fib, CAD, heart failure, obesity Home Situation Home Environment: Independent living # Steps to Enter: 0 One/Two Story Residence: One story Living Alone: Yes Support Systems: Family member(s) Patient Expects to be Discharged to[de-identified] [facility] Current DME Used/Available at Home: Cane, straight, Walker, rolling, Walker, rollator, CPAP, Wheelchair, Grab bars, Raised toilet seat, Shower chair Tub or Shower Type: Shower EXAMINATION/PRESENTATION/DECISION MAKING:  
Critical Behavior: 
Neurologic State: Alert Orientation Level: Oriented X4 Cognition: Follows commands Hearing: Auditory Auditory Impairment: Hard of hearing, bilateral 
Skin:   
Edema:  
Range Of Motion: 
AROM: Generally decreased, functional 
  
  
  
  
  
  
  
Strength:   
Strength: Generally decreased, functional 
  
  
  
  
  
  
Tone & Sensation:  
Tone: Normal 
  
  
  
  
 Sensation: Impaired Coordination: 
Coordination: Within functional limits Vision:  
  
Functional Mobility: 
Bed Mobility: 
Rolling: Maximum assistance Transfers: 
  
  
     
  
     
  
  
Balance:  
Sitting: (pt can not tolerate) Ambulation/Gait Training: 
  
  
  
  
  
  
  
  
  
  
  
  
  
  
  
  
  
  
 Stairs: Therapeutic Exercises: Ankle pumps, quad sets, glute sets, SAQ, hip IR/ER, shoulder flexion, shoulder abduction, heel slides with assistance Functional Measure: 
Tinetti test: 
 
Sitting Balance: 0 Arises: 0 Attempts to Rise: 0 Immediate Standing Balance: 0 Standing Balance: 0 Nudged: 0 Eyes Closed: 0 Turn 360 Degrees - Continuous/Discontinuous: 0 Turn 360 Degrees - Steady/Unsteady: 0 Sitting Down: 0 Balance Score: 0 Balance total score Indication of Gait: 0 
R Step Length/Height: 0 
L Step Length/Height: 0 
R Foot Clearance: 0 
L Foot Clearance: 0 Step Symmetry: 0 Step Continuity: 0 Path: 0 Trunk: 0 Walking Time: 0 Gait Score: 0 Gait total score Total Score: 0/28 Overall total score Tinetti Tool Score Risk of Falls 
<19 = High Fall Risk 19-24 = Moderate Fall Risk 25-28 = Low Fall Risk Tinetti ME. Performance-Oriented Assessment of Mobility Problems in Elderly Patients. [facility] 66; [id number]. (Scoring Description: PT Bulletin Feb. 10, 1993) Older adults: Kandis Alvarez et al, 2009; n = [address] [facility] elderly evaluated with ABC, TRENTON, ADL, and IADL) · Mean TRENTON score for males aged 69-68 years = 26.21(3.40) · Mean TRENTON score for females age 69-68 years = 25.16(4.30) · Mean TRENTON score for males over 80 years = 23.29(6.02) · Mean TRENTON score for females over 80 years = 17.20(8.32) Physical Therapy Evaluation Charge Determination History Examination Presentation Decision-Making HIGH Complexity :3+ comorbidities / personal factors will impact the outcome/ POC  MEDIUM Complexity : 3 Standardized tests and measures addressing body structure, function, activity limitation and / or participation in recreation  LOW Complexity : Stable, uncomplicated  Other outcome measures Tinetti  LOW Based on the above components, the patient evaluation is determined to be of the following complexity level: LOW Pain Rating: 
10/10 with movement 3/10 resting in bed Activity Tolerance:  
Poor, desaturates with exertion and requires oxygen, and requires rest breaks Please refer to the flowsheet for vital signs taken during this treatment. After treatment patient left in no apparent distress:  
Supine in bed, Call bell within reach, and Side rails x 3 
 
COMMUNICATION/EDUCATION:  
The patients plan of care was discussed with: Registered nurse. Fall prevention education was provided and the patient/caregiver indicated understanding., Patient/family have participated as able in goal setting and plan of care. , and Patient/family agree to work toward stated goals and plan of care. Thank you for this referral. 
Gustabo Blount, PT Time Calculation: 11 mins

## 2020-10-02 NOTE — PROGRESS NOTES
1100 : Bedside and Verbal shift change report given to Brittany Lozada RN (oncoming nurse) by Christiano Thomas RN (offgoing nurse). Report included the following information SBAR, Kardex, MAR and Cardiac Rhythm Sinus Arrythmia. 1145: Patient resting in bed. No complaints of pain. Roland-synephrine at 30 mcg/min. . Encouraged patient to call for assistance before getting out of bed. Call light in reach. 1315: Patient removed blood pressure cuff. Encouraged patient not to remove blood pressure cuff. Patient acknowledged understanding. Call light in reach. 1350: Ok to give Metoprolol 12.5 mg per Kierra Cedeño NP. 
1500: Patient sleeping. No signs of distress. 1600: Patient without complaints. 1700: Patient without complaints. Helped patient with her dinner. Bedside and Verbal shift change report given to MADAI Mcgarry (oncoming nurse) by Brittany Lozada RN (offgoing nurse). Report included the following information SBAR, Kardex, MAR and Cardiac Rhythm NSR with PACs.

## 2020-10-02 NOTE — PROGRESS NOTES
Transition of care note: 
 
RUR 25% Plan: 1. Pt was transferred from [facility] to ICU with atrial fib with RVR. 2.PT is recommending SNF. 3.OT plans to assess pt over the weekend. 4.Once OT has completed their evaluation,case managemernt will discuss the plan for SNF versus other with pt. 5.GI will reevaluate pt on Monday.  
 
Mingo Delgado

## 2020-10-02 NOTE — PROGRESS NOTES
Comprehensive Nutrition Assessment Type and Reason for Visit: Initial, RD nutrition re-screen/LOS Nutrition Recommendations/Plan: 1. Continue cardiac diet. 2. Add leonides Glucerna BID to promote adequate intake. Nutrition Assessment:    
10/2: 70 y/o female admitted with syncope and collapse. PMh includes CAD, DM, COPD. BMI 39.9, c/w class II obesity. Pt reports poor appetite since admission. breakfast tray in room untouched- says she is waiting to sit up to eat. Recorded intakes 25-50% meals. Says she was eating well PTA, 2 meals/day + one snack. Denies any recent weight changes. Pt has received Glucerna during previous admissions and liked it. She is agreeable to receiving it while here- will add BID. Labs- Ca 7.4, B12 159, A1c 7.6, -127-128. Meds- Humalog, Remeron, Protonix, [email or web address]. Intakes: 
Patient Vitals for the past 168 hrs: 
 % Diet Eaten 09/30/20 1320 50 % 09/30/20 1010 25 % Weight hx: Wt Readings from Last 10 Encounters:  
10/02/20 115.7 kg (255 lb)  
07/31/20 115.6 kg (254 lb 12.8 oz) 05/30/20 111.7 kg (246 lb 3.2 oz)  
03/23/20 113.4 kg (250 lb) 01/31/20 120.9 kg (266 lb 9.6 oz) 10/31/19 112.9 kg (249 lb) 09/05/19 108.9 kg (240 lb) 08/30/19 109.8 kg (242 lb) 08/01/19 106.1 kg (234 lb)  
07/30/19 106.1 kg (234 lb) Malnutrition Assessment: 
Malnutrition Status: none Estimated Daily Nutrient Needs: 
Energy (kcal):  5841(6827 x 1.3 AF) Protein (g):  112(1-1.2 g/kg) Fluid (ml/day):  2208(1mL/kcal or per MD) Nutrition Related Findings:  LBM 10/2; 2+ LE, + UE edema Wounds:   
None Current Nutrition Therapies: DIET CARDIAC Regular; 2 GM NA (House Low NA) DIET NUTRITIONAL SUPPLEMENTS Lunch, Breakfast; Glucerna Shake Anthropometric Measures: 
· Height:  5' 7\" (170.2 cm) · Current Body Wt:  115.7 kg (255 lb) · Ideal Body Wt:  135 lbs:  188.9 % · BMI Category:  Obese class 2 (BMI 35.0-39. 9) Nutrition Diagnosis: · Inadequate oral intake related to inadequate protein-energy intake as evidenced by intake 0-25%, intake 26-50% Nutrition Interventions:  
Food and/or Nutrient Delivery: Continue current diet, Start oral nutrition supplement Nutrition Education and Counseling: No recommendations at this time Coordination of Nutrition Care: Continued inpatient monitoring Goals: 
PO intake >50% meals + ONS next 3-5 days Nutrition Monitoring and Evaluation:  
Food/Nutrient Intake Outcomes: Food and nutrient intake, Supplement intake Physical Signs/Symptoms Outcomes: Weight, Biochemical data Discharge Planning:   
Continue current diet Electronically signed by Calvin Davila RDN on 10/2/2020 at 10:21 AM 
 
Contact: [phone]

## 2020-10-02 NOTE — PROGRESS NOTES
Physician Progress Note Shana Kruse 
CSN #:                  [id number] :                       1945 ADMIT DATE:       2020 6:41 PM 
[address] [place] DATE: 
RESPONDING 
PROVIDER #:        Kim Doll MD 
 
 
 
 
QUERY TEXT: 
 
Dear [facility] Team, 
Pt admitted with syncope and has acute on chronic anemia documented within the patient's medical record. The patient arrived with a Hgb of 5.1. Was transfused 2 units of PRBC with a Hgb of 6.1 with probable more transfusions needed. If possible, please document in progress notes and discharge summary further specificity regarding the acuity and type of anemia: 
 
 
The medical record reflects the following: 
 
Risk Factors: 69 Yr F admitted with Syncope with noted anemia Clinical Indicators: Patient arrives to the ED after syncope and c/o dizziness. Upon arrival to the ED it's noted the patient to have a Hgb of 5.1. Patient was transfused 2 units PRBC with a Hgb now of 6.1 with probable more transfusions needed. Patient has a GI consult placed. Occult stool was negative. Iron profile labs revealed Iron 16 TIBC 305 Iron saturation % 5 and ferritin 22. Patient remains tachycardic with -110. Treatment: Frequent CBC/BMP, Occult stool, 2 units PRBC, iron profile labs, GI consult and frequent monitoring/vital signs. Thank you, Chris Stroud [facility] Options provided: 
-- Anemia due to acute blood loss 
-- Anemia due to acute blood loss and iron deficiency anemia 
-- Anemia due to iron deficiency -- Other - I will add my own diagnosis -- Disagree - Not applicable / Not valid -- Disagree - Clinically unable to determine / Unknown 
-- Refer to Clinical Documentation Reviewer PROVIDER RESPONSE TEXT: 
 
This patient has acute blood loss anemia. Query created by:  Aguila Gonzalez on 2020 11:37 AM 
 
 
QUERY TEXT: 
 
Dear [facility] Team, 
 Pt admitted with syncope and has documented within progress note by MADAI Conley, Patient has a Hx of COPD and uses O2 3L at baseline. Per documented vital signs, the patient has been on 3L O2 via nasal cannula since admission. If possible, please document in progress notes and discharge summary further specificity regarding the type and acuity of respiratory failure: The medical record reflects the following: 
 
Risk Factors: 69 Yr F admitted with Synope; h/o COPD and chronic home Oxygen use of 3L Clinical Indicators: Patient arrives to the ED after a GLF following a syncope event. Per progress note by MADAI Conley, Stated that lower back is hurting 7/10. Patient has a Hx of COPD and uses O2 3L at baseline. Per documented vital signs within the patient's medical record the patient has been on 3L O2 via nasal cannula since admission. Per BMP, CO2 has been WNL. No acute respiratory distress noted within documentation. Treatment: Daily BMP, frequent monitoring/vital signs and supplemental oxygen as needed. Thank you, Jhon Santo [place] [facility] Options provided: 
-- Chronic respiratory failure with hypoxia -- Chronic respiratory failure with hypercapnia 
-- Other - I will add my own diagnosis -- Disagree - Not applicable / Not valid -- Disagree - Clinically unable to determine / Unknown 
-- Refer to Clinical Documentation Reviewer PROVIDER RESPONSE TEXT: 
 
This patient has chronic respiratory failure with hypoxia. Query created by:  Gordon Austin on 9/30/2020 12:02 PM 
 
 
Electronically signed by:  Vero Lorenzo MD 10/2/2020 8:55 AM

## 2020-10-02 NOTE — PROGRESS NOTES
Cardiology Progress Note [facility]. [address], [place], [address] [facility] Phone [phone]; Fax [phone] 
 
 
 
10/2/2020 9:01 AM  
 
Admit Date:           2020 Admit Diagnosis:  Syncope and collapse [R55] :          1945 MRN:          [id number] Impression Plan/Recommendation 1. Near syncope 2. Lumbar Compression frx 3. Severe anemia 4. GI bleed 5. Acute on chronic HFpEF per echo 20 6. CAD s/p PCI (2018) 7. COPD on home O2 8. PAD s/p fem pop 9. Permanent AF on chronic DOAC now AF RVR & A flutter 10. DMII 11. HAYES on CPAP 12. Hypokalemia · S/p 3 units of PRBCs total- hgb better at 8.8 · Holding eliquis and ASA w acute blood loss · Endoscopy procedures on hold- plans to do OP once patient is more stable/pain better controlled · Replete K- add on mag · Chest xray · Diuresis prn · Goal to wean dilt gtt - continue po lopressor and dig, goal v rate <110. Will need a dig level next week · D/c IVF- patient eating and drinking · Wean jimenez- goal SBP >100 Reviewed plan w patient/RN/pulmonary Overnight events:  
Patient hypotensive overnight- jimenez gtt started 
hgb 6.7= 1unit of PRBC Patient back in AF RVR- dilt gtt resumed 
trx to ICU Pt personally seen and examined. Chart reviewed. Agree with advanced NP's history, exam and  A/P with changes/additons. With recurrent drop in Hb -?source of bleed Back on low dose cardizem gtt for Afib withRVR; Started on Jimenez gtt overnight for  
 
A/P -as above with changes/additions made by me Discussed with patient/nursing/family Reid Kee MD, [facility] No intake/output data recorded. Last 3 Recorded Weights in this Encounter 10/01/20 6783 10/02/20 0315 10/02/20 3639 Weight: 250 lb (113.4 kg) 250 lb (113.4 kg) 255 lb (115.7 kg) 09/30 1901 - 10/02 0700 In: 2932 [P.O.:500; I.V.:2020.7] Out: 1350 [XRTFE:0509] SUBJECTIVE Levell Potash denies palpitations, irregular heart beat, SOB, chest pain  
+ back pain Breathing a little better today No lightheadedness or dizziness Current Facility-Administered Medications Medication Dose Route Frequency  0.9% sodium chloride infusion 250 mL  250 mL IntraVENous PRN  
 PHENYLephrine (IVÁN-SYNEPHRINE) 30 mg in 0.9% sodium chloride 250 mL infusion   mcg/min IntraVENous TITRATE  dilTIAZem (CARDIZEM) 100 mg in 0.9% sodium chloride (MBP/ADV) 100 mL infusion  0-15 mg/hr IntraVENous TITRATE  metoprolol tartrate (LOPRESSOR) tablet 12.5 mg  12.5 mg Oral Q6H  
 digoxin (LANOXIN) tablet 0.125 mg  0.125 mg Oral DAILY  influenza vaccine 2020-21 (6 mos+)(PF) (FLUARIX/FLULAVAL/FLUZONE QUAD) injection 0.5 mL  0.5 mL IntraMUSCular PRIOR TO DISCHARGE  metoprolol tartrate (LOPRESSOR) tablet 25 mg  25 mg Oral DAILY PRN  
 mirtazapine (REMERON) tablet 15 mg  15 mg Oral QHS  DULoxetine (CYMBALTA) capsule 60 mg  60 mg Oral DAILY  montelukast (SINGULAIR) tablet 10 mg  10 mg Oral DAILY  pantoprazole (PROTONIX) 40 mg in 0.9% sodium chloride 10 mL injection  40 mg IntraVENous Q12H  
 HYDROcodone-acetaminophen (NORCO) 5-325 mg per tablet 1 Tab  1 Tab Oral Q4H PRN  
 [Held by provider] furosemide (LASIX) tablet 20 mg  20 mg Oral DAILY  morphine injection 1 mg  1 mg IntraVENous Q6H PRN  
 0.9% sodium chloride infusion 250 mL  250 mL IntraVENous PRN  
 sodium chloride (NS) flush 5-40 mL  5-40 mL IntraVENous Q8H  
 sodium chloride (NS) flush 5-40 mL  5-40 mL IntraVENous PRN  
 acetaminophen (TYLENOL) tablet 650 mg  650 mg Oral Q6H PRN Or  
 acetaminophen (TYLENOL) suppository 650 mg  650 mg Rectal Q6H PRN  polyethylene glycol (MIRALAX) packet 17 g  17 g Oral DAILY PRN  
  promethazine (PHENERGAN) tablet 12.5 mg  12.5 mg Oral Q6H PRN Or  
 ondansetron (ZOFRAN) injection 4 mg  4 mg IntraVENous Q6H PRN  
 glucose chewable tablet 16 g  4 Tab Oral PRN  
 dextrose (D50W) injection syrg 12.5-25 g  25-50 mL IntraVENous PRN  
 glucagon (GLUCAGEN) injection 1 mg  1 mg IntraMUSCular PRN  
 insulin lispro (HUMALOG) injection   SubCUTAneous AC&HS  
 0.9% sodium chloride infusion 250 mL  250 mL IntraVENous PRN OBJECTIVE Intake/Output Summary (Last 24 hours) at 10/2/2020 [facility] Last data filed at 10/2/2020 3169 Gross per 24 hour Intake 2631.99 ml Output 350 ml Net 2281.99 ml Review of Systems - History obtained from the patient AS PER  HPI Telemetry AF RVR/flutter- rates 150-170's. Converted to NSt w freq PACs at 0755/PAF PHYSICAL EXAM  
  
 
Visit Vitals BP (!) 105/42 (BP Patient Position: At rest) Pulse 100 Temp 98.2 °F (36.8 °C) Resp 12 Ht 5' 7\" (1.702 m) Wt 255 lb (115.7 kg) SpO2 99% BMI 39.94 kg/m² Gen: Well-developed, elderly, obese, anasarca, in no acute distress  alert and oriented x 3 HEENT:  Pink conjunctivae, Hearing grossly normal.No scleral icterus or conjunctival, moist mucous membranes Neck: Supple,unable to appreciate  JVD d/t neck size Resp: No accessory muscle use, Clear breath sounds anterior. Exam limited d/t body habitus Card: irregular  Rate,Rythm, 2/6 murmurs, rubs or gallop. No thrills. GI:          soft, non-tender MSK: No cyanosis or clubbing. + sacral edema Skin: No rashes or ulcers, + bruising BUe Neuro:  Cranial nerves are grossly intact, moving all four extremities, no focal deficit, follows commands appropriately Psych:  Good insight, oriented to person, place and time, alert, Nml Affect LE: +2 pitting edema DATA REVIEW Laboratory and Imaging have been reviewed by me and are notable for Recent Labs  
  09/29/20 2027 TROIQ <0.05 Recent Labs 10/02/20 0235 10/01/20 
2341 10/01/20 
1430 10/01/20 
0530  09/29/20 2027  142  --  141   < > 137  
K 3.5 3.6  --  3.5   < > 4.3 CO2 29 30  --  32   < > 30 BUN 19 21*  --  27*   < > 29* CREA 1.11* 1.09*  --  1.20*   < > 1.07* * 127*  --  167*   < > 190* MG  --   --   --  2.0  --  2.2 WBC 17.6* 14.0* 16.3* 18.8*   < > 20.3* HGB 8.8* 6.7* 7.0* 7.1*   < > 5.1*  
HCT 29.6* 24.1* 24.6* 24.8*   < > 19.6*  252 268 261   < > 370  
 < > = values in this interval not displayed.   
 
 
 
 
 
Peyton Simeon, NP

## 2020-10-02 NOTE — PROGRESS NOTES
Daughter Ronen Riley called and notified of patient room change to ICU 11. Questions answered. ICU RN station phone number provided.

## 2020-10-02 NOTE — PROGRESS NOTES
Occupational Therapy Note:  Orders received and appreciated. Chart reviewed. RRT called and pt transferred to ICU for A-fib requiring rate control. Will follow up over the weekend for OT eval.  Thank you for the consult.  
Hank Carter, OTR/l, CBIS

## 2020-10-02 NOTE — PROGRESS NOTES
GI note Now in ICU for rate control r/t AFib. Hgb 8.8 No plans for immediate endoscopic evaluation. Will revisit Monday and see if we can accomplish EGD/colonoscopy inpatient vs outpatient. Call if needed this weekend.  
Zuleyma Little MD

## 2020-10-03 NOTE — PROGRESS NOTES
Daily Progress Note: 10/3/2020 Breana Garrett PCP: 
Aston Gaviria MD 
 
Assessment/Plan:  
Acute Syncopal Episode - likely due to low BP from anemia 
- monitor/tx as needed Hypotension - resolved 
- off pressors 10/2 CAD- stable CHF 
- Diuretics as needed Chronic A-fib with acute exacerbations of RVR 
- Holding Eliquis - Cards consulted - Metoprolol Acute on chronic anemia, with hemorrhagic shock 10/1/20 
- Transfuse as needed; Roland as needed to support BP 
- GI consult - colonoscopy planned at some point - currently on hold - PPI Dehydration 
- IVF 
 
DM2 
- SSI, Monitor BG ac and hs 
 
HAYES - CPAP Depression/Anxiety - Remeron and Cymbata Nondisplaced acute fracture L3 superior endplate. 
- poss kyphoplasty when more stable - Ortho has seen Problem List: 
Problem List as of 10/3/2020 Date Reviewed: 5/29/2020 Codes Class Noted - Resolved Syncope and collapse ICD-10-CM: R55 
ICD-9-CM: 780.2  9/29/2020 - Present Cellulitis of lower extremity ICD-10-CM: L03.119 ICD-9-CM: 682.6  3/23/2020 - Present ASO (arteriosclerosis obliterans) ICD-10-CM: I70.90 ICD-9-CM: 440.9  9/5/2019 - Present PVD (peripheral vascular disease) ([facility] ICD-10-CM: I73.9 ICD-9-CM: 443.9  8/1/2019 - Present Severe obesity ([facility] ICD-10-CM: E66.01 
ICD-9-CM: 278.01  7/30/2019 - Present UTI (urinary tract infection) ICD-10-CM: N39.0 ICD-9-CM: 599.0  7/13/2019 - Present COPD (chronic obstructive pulmonary disease) (HCC) ICD-10-CM: J44.9 ICD-9-CM: 102  7/13/2019 - Present Sepsis ([facility] ICD-10-CM: A41.9 ICD-9-CM: 038.9, 995.91  7/13/2019 - Present Normocytic anemia ICD-10-CM: D64.9 ICD-9-CM: 285.9  7/13/2019 - Present PAD (peripheral artery disease) (HCC) ICD-10-CM: I73.9 ICD-9-CM: 443.9  7/13/2019 - Present Mild intermittent asthma ICD-10-CM: J45.20 ICD-9-CM: 493.90  5/17/2019 - Present Gangrene of finger of right hand [facility]) ICD-10-CM: I73 
ICD-9-CM: 785.4  5/17/2019 - Present Brachial artery thrombus (HCC) ICD-10-CM: X85.4 ICD-9-CM: 444.21  4/26/2019 - Present Sleep apnea ICD-10-CM: G47.30 ICD-9-CM: 780.57  1/5/2019 - Present Overview Signed 1/5/2019  8:41 PM by Gage Gardner DO  
  wears CPAP Atrial fibrillation [facility]) ICD-10-CM: I48.91 
ICD-9-CM: 427.31  11/16/2018 - Present Obesity (BMI 30-39.9) (Chronic) ICD-10-CM: W92.5 ICD-9-CM: 278.00  11/13/2018 - Present Type 2 diabetes with nephropathy ([facility] ICD-10-CM: E11.21 
ICD-9-CM: 250.40, 583.81  10/1/2018 - Present Recurrent deep vein thrombosis (DVT) (HCC) ICD-10-CM: I82.409 ICD-9-CM: 453.40  3/30/2018 - Present Chronic anticoagulation (Chronic) ICD-10-CM: Z79.01 
ICD-9-CM: V58.61  3/30/2018 - Present Coronary artery disease involving native coronary artery without angina pectoris (Chronic) ICD-10-CM: I25.10 ICD-9-CM: 414.01  1/22/2016 - Present Essential hypertension (Chronic) ICD-10-CM: I10 
ICD-9-CM: 401.9  1/22/2016 - Present CAD (coronary artery disease) ICD-10-CM: I25.10 ICD-9-CM: 414.00  10/9/2015 - Present Type II diabetes mellitus (HCC) (Chronic) ICD-10-CM: E11.9 ICD-9-CM: 250.00  10/9/2015 - Present RESOLVED: Cellulitis ICD-10-CM: L03.90 ICD-9-CM: 682.9  3/23/2020 - 5/29/2020 RESOLVED: Hypokalemia ICD-10-CM: E87.6 ICD-9-CM: 276.8  3/23/2020 - 5/29/2020 RESOLVED: Hyponatremia ICD-10-CM: E87.1 ICD-9-CM: 276.1  3/23/2020 - 5/29/2020 RESOLVED: Diarrhea ICD-10-CM: R19.7 ICD-9-CM: 787.91  3/23/2020 - 5/29/2020 RESOLVED: Syncope and collapse ICD-10-CM: R55 
ICD-9-CM: 780.2  7/13/2019 - 5/29/2020 RESOLVED: Leg wound, left ICD-10-CM: F93.281X ICD-9-CM: 891.0  7/13/2019 - 5/29/2020 RESOLVED: Leg laceration, right, initial encounter ICD-10-CM: J83.644N ICD-9-CM: 894.0  7/13/2019 - 5/29/2020 RESOLVED: Cellulitis of right upper arm ICD-10-CM: L03.113 ICD-9-CM: 682.3  5/17/2019 - 5/29/2020 RESOLVED: Bowel obstruction ([facility] ICD-10-CM: U30.448 ICD-9-CM: 560.9  1/8/2019 - 5/29/2020 RESOLVED: Partial small bowel obstruction ([facility] ICD-10-CM: K56.600 ICD-9-CM: 560.9  1/5/2019 - 5/29/2020 RESOLVED: Dyspnea ICD-10-CM: R06.00 
ICD-9-CM: 786.09  11/13/2018 - 5/29/2020 RESOLVED: ARF (acute renal failure) (HCC) ICD-10-CM: N17.9 ICD-9-CM: 584.9  11/13/2018 - 5/29/2020 RESOLVED: Closed wedge compression fracture of T7 vertebra ([facility] ICD-10-CM: Q15.689U ICD-9-CM: 805.2  11/13/2018 - 5/29/2020 RESOLVED: Chest pain ICD-10-CM: R07.9 ICD-9-CM: 786.50  6/27/2018 - 5/29/2020 RESOLVED: Abdominal pain ICD-10-CM: R10.9 ICD-9-CM: 789.00  6/27/2018 - 5/29/2020 RESOLVED: HTN (hypertension) ICD-10-CM: I10 
ICD-9-CM: 401.9  10/9/2015 - 1/22/2016 RESOLVED: NSTEMI (non-ST elevated myocardial infarction) ([facility] ICD-10-CM: I21.4 ICD-9-CM: 410.70  10/9/2015 - 5/29/2020 HPI:  
 53-year-old female with multiple medical problems including obesity, sleep apnea, non-ST-elevation myocardial infarction, primary hypertension, congestive heart failure, gastroesophageal reflux disease, type 2 diabetes mellitus, temporal arteritis, fibromyalgia, atrial fibrillation and asthma, was brought to the emergency room from home for complaints of lightheadedness, dizziness with subsequent fall and low back pain. Patient stated that whilst preparing some dinner in the evening hours of 09/29/2020, had the onset of symptoms with subsequent fall in a sitting position. Thereafter, patient noted persistent low back pain. Patient denied any recent changes in medication. Denied headaches, visual deficits, chest pain, palpitations, sore throat, cough, shortness of breath, nausea, vomiting, fevers, chills, abdominal pain, bladder or bowel irregularities. (Dr Adeola Saravia) : She is c/o pain in her back. Meds are helping. Hb 5.1 and she has been transfused with 2 units. AM labs pending and she will need a line as she is a difficult stick. May need more blood. She reports her stools have been dark so will ask GI to see. Holding Eliquis. She is on chronic steroids due to TA.  
 
10/1:  Heart rate up to 180s this AM - seems to have spiked up due to back pain but now back in 110s. No other complaint except back pain. No chest pain or SOB. Hb 7.1 this AM - watching. GI planning colonoscopy - starting prep. Poss kyphoplasty when more stable. 10/2:  Reports she feels \"just bad all over\" this AM.  Last night became hypotensive, dropped Hb again, required another transfusion of 1 unit, and Roland had to be started to maintain BP - remains on Roland this AM.  Also mult episodes of RVR of her A fib - Cards treating and Dilt drip started. Transferred to ICU. Hb up to 8.8 this AM.  Tmax 99. WBC up a little from previous -watching. 10/3:  Feeling much better this AM and sitting up eating breakfast.  Back in sinus rhythm with rate in 80s at this time. BP better. Off pressors. Off IV dilt today and on BB. Hb 8.0. Hopefully out of ICU later today or tomorrow. Review of Systems: A comprehensive review of systems was negative except for that written in the HPI. Objective:  
Physical Exam:  
Visit Vitals BP (!) 127/54 Pulse 89 Temp 98.4 °F (36.9 °C) Resp 21 Ht 5' 7\" (1.702 m) Wt 115.7 kg (255 lb) SpO2 100% BMI 39.94 kg/m² O2 Flow Rate (L/min): 2 l/min O2 Device: Nasal cannula Temp (24hrs), Av.4 °F (36.9 °C), Min:98.3 °F (36.8 °C), Max:98.5 °F (36.9 °C) No intake/output data recorded. 10/01 1901 - 10/03 0700 In: 1524.1 [P.O.:450; I.V.:662.8] Out: 500 [Urine:500] General:  Alert, cooperative, no distress, obese Head:  Normocephalic, without obvious abnormality, atraumatic. Eyes:  Conjunctivae/corneas clear. PERRL, EOMs intact. Throat: Lips, mucosa, and tongue normal. Teeth and gums normal.  
Neck: Supple, symmetrical, trachea midline, no adenopathy, thyroid: no enlargement/tenderness/nodules, no carotid bruit and no JVD. Back:   Symmetric, no curvature. ROM normal. No CVA tenderness. Lungs:   Clear to auscultation bilaterally. Chest wall:  No tenderness or deformity. Heart:  Currently in sinus rhythm with rate in 57R, no murmur, click, rub or gallop. Abdomen:   Soft, non-tender. Bowel sounds normal. No masses,  No organomegaly. Extremities: Extremities with weeping and swelling, mildly erythematous, dressed. No calf tenderness or cords. Pulses: 1+ and symmetric all extremities. Skin: Skin color, texture, turgor normal.   
Neurologic: CNII-XII intact. Alert and oriented X 3. Fine motor of hands and fingers normal.   equal.  No cogwheeling or rigidity. Gait not tested at this time. Sensation grossly normal to touch. Gross motor of extremities normal.    
 
Data Review:  
CT Head IMPRESSION: No acute findings. EXAM:  CT CERVICAL SPINE WITHOUT CONTRAST INDICATION: fall w head injury. COMPARISON: None. CONTRAST:  None. FINDINGS: 
Alignment is satisfactory, no acute fracture or dislocation. Moderate spondylosis and disc degeneration at C5-6 IMPRESSION 
 impression: No acute changes. CT LS spine Axial CT scan of the lumbar sacral spine obtained without contrast with coronal 
and sagittal reconstructions. No prior. CT dose reduction was achieved through 
the use of a standardized protocol tailored for this examination and automatic 
exposure control for dose modulation . Kaylie Messing There is a minimally displaced fracture of the superior endplate of L3 without 
significant canal compromise. There is some mild diffuse spondylosis and disc degeneration. IMPRESSION 
 impression: Nondisplaced acute fracture L3 superior endplate. Recent Days: 
Recent Labs 10/03/20 
7577 10/02/20 
0703 10/01/20 
4841 WBC 15.8* 17.6* 14.0* HGB 8.0* 8.8* 6.7* HCT 27.7* 29.6* 24.1*  
 250 252 Recent Labs 10/03/20 
0517 10/02/20 
0703 10/01/20 
2341 10/01/20 
0530  09/30/20 
1019  142 142 141   < > 140  
K 4.2 3.5 3.6 3.5   < > 3.6  106 106 104   < > 105 CO2 30 29 30 32   < > 30 * 137* 127* 167*   < > 115* BUN 17 19 21* 27*   < > 27* CREA 1.10* 1.11* 1.09* 1.20*   < > 0.99  
CA 7.3* 7.4* 7.2* 7.6*   < > 7.6*  
MG  --  2.0  --  2.0  --   --   
ALB 1.7* 1.9*  --  2.2*  --  2.2* TBILI 1.5* 2.5*  --  3.2*  --  1.8* ALT 32 36  --  40  --  38 INR  --   --   --   --   --  1.1  
 < > = values in this interval not displayed. 24 Hour Results: 
Recent Results (from the past 24 hour(s)) GLUCOSE, POC Collection Time: 10/02/20  1:22 PM  
Result Value Ref Range Glucose (POC) 124 (H) 65 - 100 mg/dL Performed by Adam Claire GLUCOSE, POC Collection Time: 10/02/20  3:44 PM  
Result Value Ref Range Glucose (POC) 154 (H) 65 - 100 mg/dL Performed by Adam Claire GLUCOSE, POC Collection Time: 10/02/20  9:17 PM  
Result Value Ref Range Glucose (POC) 184 (H) 65 - 100 mg/dL Performed by Sisi Gatica CBC WITH AUTOMATED DIFF Collection Time: 10/03/20  5:17 AM  
Result Value Ref Range WBC 15.8 (H) 3.6 - 11.0 K/uL  
 RBC 3.19 (L) 3.80 - 5.20 M/uL HGB 8.0 (L) 11.5 - 16.0 g/dL HCT 27.7 (L) 35.0 - 47.0 % MCV 86.8 80.0 - 99.0 FL  
 MCH 25.1 (L) 26.0 - 34.0 PG  
 MCHC 28.9 (L) 30.0 - 36.5 g/dL RDW 20.3 (H) 11.5 - 14.5 % PLATELET 139 895 - 338 K/uL MPV 10.2 8.9 - 12.9 FL  
 NRBC 0.1 (H) 0  WBC ABSOLUTE NRBC 0.02 (H) 0.00 - 0.01 K/uL NEUTROPHILS 91 (H) 32 - 75 % LYMPHOCYTES 3 (L) 12 - 49 % MONOCYTES 4 (L) 5 - 13 % EOSINOPHILS 0 0 - 7 % BASOPHILS 0 0 - 1 % IMMATURE GRANULOCYTES 2 (H) 0.0 - 0.5 % ABS. NEUTROPHILS 14.4 (H) 1.8 - 8.0 K/UL  
 ABS. LYMPHOCYTES 0.5 (L) 0.8 - 3.5 K/UL  
 ABS. MONOCYTES 0.6 0.0 - 1.0 K/UL ABS. EOSINOPHILS 0.0 0.0 - 0.4 K/UL  
 ABS. BASOPHILS 0.0 0.0 - 0.1 K/UL  
 ABS. IMM. GRANS. 0.3 (H) 0.00 - 0.04 K/UL  
 DF SMEAR SCANNED    
 RBC COMMENTS ANISOCYTOSIS 2+ 
    
 RBC COMMENTS POLYCHROMASIA 1+ WBC COMMENTS TOXIC GRANULATION    
METABOLIC PANEL, COMPREHENSIVE Collection Time: 10/03/20  5:17 AM  
Result Value Ref Range Sodium 137 136 - 145 mmol/L Potassium 4.2 3.5 - 5.1 mmol/L Chloride 105 97 - 108 mmol/L  
 CO2 30 21 - 32 mmol/L Anion gap 2 (L) 5 - 15 mmol/L Glucose 295 (H) 65 - 100 mg/dL BUN 17 6 - 20 MG/DL Creatinine 1.10 (H) 0.55 - 1.02 MG/DL  
 BUN/Creatinine ratio 15 12 - 20 GFR est AA 59 (L) >60 ml/min/1.73m2 GFR est non-AA 49 (L) >60 ml/min/1.73m2 Calcium 7.3 (L) 8.5 - 10.1 MG/DL Bilirubin, total 1.5 (H) 0.2 - 1.0 MG/DL  
 ALT (SGPT) 32 12 - 78 U/L  
 AST (SGOT) 22 15 - 37 U/L Alk. phosphatase 189 (H) 45 - 117 U/L Protein, total 4.5 (L) 6.4 - 8.2 g/dL Albumin 1.7 (L) 3.5 - 5.0 g/dL Globulin 2.8 2.0 - 4.0 g/dL A-G Ratio 0.6 (L) 1.1 - 2.2 GLUCOSE, POC Collection Time: 10/03/20  7:47 AM  
Result Value Ref Range Glucose (POC) 270 (H) 65 - 100 mg/dL Performed by Brianne Oliver Medications reviewed Current Facility-Administered Medications Medication Dose Route Frequency  0.9% sodium chloride infusion 250 mL  250 mL IntraVENous PRN  
 PHENYLephrine (IVÁN-SYNEPHRINE) 30 mg in 0.9% sodium chloride 250 mL infusion   mcg/min IntraVENous TITRATE  arformoteroL (BROVANA) neb solution 15 mcg  15 mcg Nebulization BID RT  
 budesonide (PULMICORT) 500 mcg/2 ml nebulizer suspension  500 mcg Nebulization BID RT  
 hydrocortisone Sod Succ (PF) (SOLU-CORTEF) injection 100 mg  100 mg IntraVENous Q8H  
 ipratropium (ATROVENT) 0.02 % nebulizer solution 0.5 mg  0.5 mg Nebulization Q6H RT  
 dilTIAZem (CARDIZEM) 100 mg in 0.9% sodium chloride (MBP/ADV) 100 mL infusion  0-15 mg/hr IntraVENous TITRATE  metoprolol tartrate (LOPRESSOR) tablet 12.5 mg  12.5 mg Oral Q6H  
 digoxin (LANOXIN) tablet 0.125 mg  0.125 mg Oral DAILY  influenza vaccine 2020-21 (6 mos+)(PF) (FLUARIX/FLULAVAL/FLUZONE QUAD) injection 0.5 mL  0.5 mL IntraMUSCular PRIOR TO DISCHARGE  metoprolol tartrate (LOPRESSOR) tablet 25 mg  25 mg Oral DAILY PRN  
 mirtazapine (REMERON) tablet 15 mg  15 mg Oral QHS  DULoxetine (CYMBALTA) capsule 60 mg  60 mg Oral DAILY  montelukast (SINGULAIR) tablet 10 mg  10 mg Oral DAILY  pantoprazole (PROTONIX) 40 mg in 0.9% sodium chloride 10 mL injection  40 mg IntraVENous Q12H  
 HYDROcodone-acetaminophen (NORCO) 5-325 mg per tablet 1 Tab  1 Tab Oral Q4H PRN  
 [Held by provider] furosemide (LASIX) tablet 20 mg  20 mg Oral DAILY  morphine injection 1 mg  1 mg IntraVENous Q6H PRN  
 0.9% sodium chloride infusion 250 mL  250 mL IntraVENous PRN  
 sodium chloride (NS) flush 5-40 mL  5-40 mL IntraVENous Q8H  
 sodium chloride (NS) flush 5-40 mL  5-40 mL IntraVENous PRN  
 acetaminophen (TYLENOL) tablet 650 mg  650 mg Oral Q6H PRN Or  
 acetaminophen (TYLENOL) suppository 650 mg  650 mg Rectal Q6H PRN  polyethylene glycol (MIRALAX) packet 17 g  17 g Oral DAILY PRN  promethazine (PHENERGAN) tablet 12.5 mg  12.5 mg Oral Q6H PRN Or  
 ondansetron (ZOFRAN) injection 4 mg  4 mg IntraVENous Q6H PRN  
 glucose chewable tablet 16 g  4 Tab Oral PRN  
 dextrose (D50W) injection syrg 12.5-25 g  25-50 mL IntraVENous PRN  
 glucagon (GLUCAGEN) injection 1 mg  1 mg IntraMUSCular PRN  
 insulin lispro (HUMALOG) injection   SubCUTAneous AC&HS  
 0.9% sodium chloride infusion 250 mL  250 mL IntraVENous PRN Care Plan discussed with: Patient and Nurse Total time spent with patient and review of records: 30 minutes.  
Buffy Gilbert MD

## 2020-10-03 NOTE — PROGRESS NOTES
0700- Bedside and Verbal shift change report given to [address] (oncoming nurse) by Cheng Mercer RN (offgoing nurse). Report included the following information SBAR, Kardex, Procedure Summary, Intake/Output, MAR, Accordion, Recent Results, Cardiac Rhythm sinus rhythm  and Alarm Parameters . 0800- Shift assessment complete. Pt alert and oriented, follows commands, all extremities move spontaneously and are weak throughout. Pupils equal and reactive, neuro intact. Lungs diminished in all fields on 2 liters NC, oxygenating 99%. NSR on tele, /66. Abdomen soft and intact with active bowel sounds. [place] Click in place, patent and draining. Scheduled meds provided. Pt turned and repositioned. No c/o pain. 0900- Dr. Syeda Gregg at bedside, updated on pt condition. Asked if she could be downgraded and he said we will re-evaluate tomorrow. 1200- Re-assessment complete, no changes noted. Pt turned and repositioned. No current c/o pain. Pt has not voided all day, asked if she felt the need and she denied. Will bladder scan if she continues to not feel the urge to void 1400- Pt turned and repositioned. 1600- Re-assessment complete, no changes noted. Pt remains on 2 liters NC, oxygenating 100%. Pt turned and repositioned 1520- Pt voided in the puriwick, leaked around and saturated the chux pad. Bathed pt and placed a new puriwick. 1745- , 7 units given. 1800- pt turned and repositioned 1900- Bedside and Verbal shift change report given to Mahnaz AJ (oncoming nurse) by [address] (offgoing nurse). Report included the following information SBAR, Kardex, Procedure Summary, MAR, Accordion, Med Rec Status, Cardiac Rhythm sinus rhythm  and Alarm Parameters .

## 2020-10-03 NOTE — PROGRESS NOTES
Back in sinus 90s Off jimenez Off iv diltiazem Continue oral metoprolol and digoxin Off [facility] for now Okay to remote tele CV wise Will have team recheck on Monday Call if questions otherwise Braeden Dejesus MD

## 2020-10-03 NOTE — PROGRESS NOTES
[facility] Name: Bradley Escalante MRN: [id number] : 1945 Hospital: [facility] Date: 10/3/2020 Impression Plan 1. Hypotension-resolved 2. Afib wit RVR 3. Anemia, suspected GI bleed 4. CAD 5. HFpEF 6. PAD 7. HAYES on CPAP 
8. COPD 9. Chronic respiratory failure with hypoxia · Phenylephrine has been discontinued · Dilt drip off; rate control as per cardiolgoy · Eliquis and ASA on hold due to concern for GI bleed · GI following, plan for scop once more stable · Will start Duonebs and Brovana/Pulmicort as she is on [place] on home and has no scheduled or prn bronchodilators · CPAP when sleeping, will order · Protonix · SCDs · Cardiac diet · [place] stable for transfer to telemetry, we will be available to see her if needed over the weekend and check back on Monday Radiology ( personally reviewed) CXR 2020: L basilar atelectasis, cardiomegaly ABG No results for input(s): PHI, PO2I, PCO2I in the last 72 hours. Subjective Patient is a 69 y.o. female with a history of CAD s/p PCI, HFpEF, afib, HTN, PAD s/p L fem pop, recurrent DVT, COPD, chronic respiratory failure with hypoxia, and HAYES who presented with syncope and found to have anemia with concern for GIB. Overnight she was transferred to the ICU for afib with RVR and hypotension. She initially required a dilt drip, now off. On [place] at 28. Denies shortness of breath, chest pain, palpitations, dizziness. 10/4 Patient was seen and examined earlier today. She has been weaned off [place]-Synephrine. She appears comfortable and denies shortness of breath. Cardiac rhythm appears to be stable. Discussed with RN Review of Systems: A comprehensive review of systems was negative except for that written in the HPI. Past Medical History:  
Diagnosis Date  Asthma  Atrial fibrillation ([place]) 2018  Autoimmune disease ([place]) fibromyalgia  CAD (coronary artery disease) 10/9/2015  Closed wedge compression fracture of T7 vertebra ([facility] [address] 11/13/2018  Diabetes ([facility]  DVT (deep vein thrombosis) in pregnancy  GERD (gastroesophageal reflux disease)  [facility] ([facility]  History of vascular access device 09/30/2020  
 4f midline, 12cm at 1cm out placed in L Cephalic by Andrea Mackey RN  
 HTN (hypertension)  NSTEMI (non-ST elevated myocardial infarction) ([facility] 10/9/2015  Obesity (BMI 30-39.9) 11/13/2018  Sleep apnea   
 wears CPAP Past Surgical History:  
Procedure Laterality Date  ABDOMEN SURGERY PROC UNLISTED    
 hital hernia repair, gall bladder removed  AMPUTATION TRANSMETACARPAL Right 06/12/2019  
 entire ring finger, 2nd & 3rd fingers (transmetacarpal)  BREAST SURGERY PROCEDURE UNLISTED    
 lumpectomy  CARDIAC SURG PROCEDURE UNLIST  10/9/15  
 2 stents [place]  HX COLOSTOMY    
 and reversal, post episiotomy repair [address] [place]  HX UROLOGICAL  2009  
 bladder sling Prior to Admission medications Medication Sig Start Date End Date Taking? Authorizing Provider  
tiotropium bromide (Spiriva Respimat) 2.5 mcg/actuation inhaler Take 2 Puffs by inhalation daily as needed. Yes Provider, Historical  
montelukast (Singulair) 10 mg tablet Take 10 mg by mouth daily. Yes Provider, Historical  
sAXagliptin (ONGLYZA) 5 mg tab tablet Take 5 mg by mouth daily. Yes Provider, Historical  
traMADoL (ULTRAM) 50 mg tablet Take 50 mg by mouth every six (6) hours as needed for Pain. Yes Provider, Historical  
metoprolol tartrate (LOPRESSOR) 25 mg tablet Take 25 mg by mouth daily as needed (For systolic >654). Yes Provider, Historical  
acetaminophen (TYLENOL) 325 mg tablet Take 1 Tab by mouth every four (4) hours as needed for Pain. 3/31/20  Yes Celestina Junior MD  
magnesium oxide (MAG-OX) 400 mg tablet Take 400 mg by mouth nightly.    Yes Provider, Historical  
potassium chloride SR (KLOR-CON 10) 10 mEq tablet Take 10 mEq by mouth two (2) times a day. Yes Provider, Historical  
predniSONE (DELTASONE) 10 mg tablet Take 10 mg by mouth two (2) times daily (with meals). Yes Provider, Historical  
Omeprazole delayed release (PRILOSEC D/R) 20 mg tablet Take 20 mg by mouth two (2) times a day. Yes Provider, Historical  
ibandronate (BONIVA) 150 mg tablet Take 150 mg by mouth every thirty (30) days. Yes Provider, Historical  
promethazine (PHENERGAN) 25 mg tablet Take 25 mg by mouth every eight (8) hours as needed for Nausea (Nausea not relieved by ondansetron). Yes Provider, Historical  
mirtazapine (REMERON) 15 mg tablet Take 15 mg by mouth nightly. Yes Provider, Historical  
albuterol (PROVENTIL VENTOLIN) 2.5 mg /3 mL (0.083 %) nebulizer solution 2.5 mg by Nebulization route every six (6) hours as needed for Wheezing or Shortness of Breath. Yes Provider, Historical  
albuterol (PROAIR HFA) 90 mcg/actuation inhaler Take 2 Puffs by inhalation every four (4) hours as needed. Yes Provider, Historical  
lactobacillus rhamnosus gg 10 billion cell (CULTURELLE) 10 billion cell capsule Take 1 Cap by mouth daily. Yes Provider, Historical  
biotin 10,000 mcg cap Take 1 Cap by mouth daily. Yes Provider, Historical  
DULoxetine (CYMBALTA) 60 mg capsule Take 60 mg by mouth daily. Yes Provider, Historical  
cholecalciferol, vitamin D3, (Vitamin D3) 50 mcg (2,000 unit) tab Take 2,000 Units by mouth daily. Yes Provider, Historical  
azelastine-fluticasone (DYMISTA) 137-50 mcg/spray spry 2 Sprays by Nasal route daily as needed (allergies). Yes Provider, Historical  
mometasone-formoterol (DULERA) 200-5 mcg/actuation HFA inhaler Take 2 Puffs by inhalation two (2) times daily as needed. Yes Provider, Historical  
 
Current Facility-Administered Medications Medication Dose Route Frequency  arformoteroL (BROVANA) neb solution 15 mcg  15 mcg Nebulization BID RT  
 budesonide (PULMICORT) 500 mcg/2 ml nebulizer suspension  500 mcg Nebulization BID RT  
 hydrocortisone Sod Succ (PF) (SOLU-CORTEF) injection 100 mg  100 mg IntraVENous Q8H  
 ipratropium (ATROVENT) 0.02 % nebulizer solution 0.5 mg  0.5 mg Nebulization Q6H RT  
 dilTIAZem (CARDIZEM) 100 mg in 0.9% sodium chloride (MBP/ADV) 100 mL infusion  0-15 mg/hr IntraVENous TITRATE  metoprolol tartrate (LOPRESSOR) tablet 12.5 mg  12.5 mg Oral Q6H  
 digoxin (LANOXIN) tablet 0.125 mg  0.125 mg Oral DAILY  influenza vaccine 2020-21 (6 mos+)(PF) (FLUARIX/FLULAVAL/FLUZONE QUAD) injection 0.5 mL  0.5 mL IntraMUSCular PRIOR TO DISCHARGE  mirtazapine (REMERON) tablet 15 mg  15 mg Oral QHS  DULoxetine (CYMBALTA) capsule 60 mg  60 mg Oral DAILY  montelukast (SINGULAIR) tablet 10 mg  10 mg Oral DAILY  pantoprazole (PROTONIX) 40 mg in 0.9% sodium chloride 10 mL injection  40 mg IntraVENous Q12H  
 [Held by provider] furosemide (LASIX) tablet 20 mg  20 mg Oral DAILY  sodium chloride (NS) flush 5-40 mL  5-40 mL IntraVENous Q8H  
 insulin lispro (HUMALOG) injection   SubCUTAneous AC&HS Allergies Allergen Reactions  Advair Diskus [Fluticasone Propion-Salmeterol] Rash  Aspartame Other (comments)  Breo Ellipta [Fluticasone Furoate-Vilanterol] Rash  Bumex [Bumetanide] Myalgia  Ciprofloxacin Rash  Statins-Hmg-Coa Reductase Inhibitors Other (comments) Muscle pain Lipitor/crestor/zocor  Sulfa (Sulfonamide Antibiotics) Rash  Tetracycline Other (comments) musclepain  Torsemide Rash and Myalgia  Zetia [Ezetimibe] Myalgia Social History Tobacco Use  Smoking status: Former Smoker Last attempt to quit: 3/30/2017 Years since quitting: 3.5  Smokeless tobacco: Never Used Substance Use Topics  Alcohol use: No  
  Alcohol/week: 0.0 standard drinks Comment: very very rarely Family History Problem Relation Age of Onset  Diabetes Mother  Heart Failure Mother  Kidney Disease Mother  Diabetes Father  Heart Disease Father  Elevated Lipids Father  Heart Failure Father  Heart Disease Brother  Cancer Brother   
     kidney  Diabetes Brother  No Known Problems Brother  No Known Problems Brother Laboratory: I have personally reviewed the critical care flowsheet and labs. Recent Labs 10/03/20 
5451 10/02/20 
0703 10/01/20 
2341 WBC 15.8* 17.6* 14.0* HGB 8.0* 8.8* 6.7* HCT 27.7* 29.6* 24.1*  
 250 252 Recent Labs 10/03/20 
9334 10/02/20 
0703 10/01/20 
2341 10/01/20 
0530  142 142 141  
K 4.2 3.5 3.6 3.5  106 106 104 CO2 30 29 30 32 * 137* 127* 167* BUN 17 19 21* 27* CREA 1.10* 1.11* 1.09* 1.20* CA 7.3* 7.4* 7.2* 7.6*  
MG  --  2.0  --  2.0 ALB 1.7* 1.9*  --  2.2* ALT 32 36  --  40 Objective: Mode Rate Tidal Volume Pressure FiO2 PEEP Vital Signs:   
 TMAX(24) Intake/Output:  
Last shift:        
Last 3 shifts: 10/03 0701 - 10/03 1900 In: 240 [P.O.:240] Out: - [id number] Intake/Output Summary (Last 24 hours) at 10/3/2020 1102 Last data filed at 10/3/2020 0800 Gross per 24 hour Intake 775.26 ml Output 300 ml Net 475.26 ml EXAM:  
GENERAL: well developed and in no distress, HEENT:  PERRL, EOMI, no alar flaring or epistaxis, oral mucosa moist without cyanosis, NECK:  no jugular vein distention LUNGS: CTAB, respirations non-labored, HEART:  Regular rate and rhythm with no MGR; 2+ edema is present, ABDOMEN:  soft with no tenderness, bowel sounds present, EXTREMITIES:  warm with no cyanosis, SKIN:  no jaundice or ecchymosis and NEUROLOGIC:  alert and oriented, grossly non-focal 
 
Edgar MD Maxime 
[facility] [place]

## 2020-10-04 NOTE — PROGRESS NOTES
[facility] Dosing Services: IV to PO Conversion The pharmacist has determined that this patient meets P & T approved criteria for conversion from IV to oral therapy for the following medication:Pantoprazole 40 mg IV every 12 hours. The pharmacist has written the following order for the patient: Pantoprazole 40 mg PO twice daily before meals. The pharmacist will continue to monitor the patient's status and advise the physician if conversion back to IV therapy is recommended. Signed Monie Vizcaino Contact information:  [phone]

## 2020-10-04 NOTE — PROGRESS NOTES
PULMONARY ASSOCIATES [facility] Name: Teofilo Bergeron MRN: [id number] : 1945 Hospital: [address] Date: 10/4/2020 Impression Plan 1. Hypotension-resolved 2. Afib wit RVR 3. Anemia, suspected GI bleed 4. CAD 5. HFpEF 6. PAD 7. HAYES on CPAP 
8. COPD 9. Chronic respiratory failure with hypoxia · Phenylephrine has been discontinued · Dilt drip off; rate control as per cardiolgoy · Eliquis and ASA on hold due to concern for GI bleed · GI following, plan for scop once more stable · Will start Duonebs and Brovana/Pulmicort as she is on [place] on home and has no scheduled or prn bronchodilators · CPAP when sleeping, will order · Protonix · SCDs · Cardiac diet · Stable for transfer to telemetry, we will be available to see her if needed over the weekend and check back on Monday-transfer orders placed Radiology ( personally reviewed) CXR 2020: L basilar atelectasis, cardiomegaly ABG No results for input(s): PHI, PO2I, PCO2I in the last 72 hours. Subjective Patient is a 69 y.o. female with a history of CAD s/p PCI, HFpEF, afib, HTN, PAD s/p L fem pop, recurrent DVT, COPD, chronic respiratory failure with hypoxia, and HAYES who presented with syncope and found to have anemia with concern for GIB. Overnight she was transferred to the ICU for afib with RVR and hypotension. She initially required a dilt drip, now off. On [place] at 28. Denies shortness of breath, chest pain, palpitations, dizziness. 10/4 Seen and examined earlier today. Uneventful night and rhythm has been stable. She denies chest pain or shortness of breath 
10/3 Patient was seen and examined earlier today. She has been weaned off [place]-Synephrine. She appears comfortable and denies shortness of breath. Cardiac rhythm appears to be stable. Discussed with RN Review of Systems: A comprehensive review of systems was negative except for that written in the hospitals. Past Medical History:  
Diagnosis Date  Asthma  Atrial fibrillation ([facility] [address] 11/16/2018  Autoimmune disease ([facility]   
 fibromyalgia  CAD (coronary artery disease) 10/9/2015  Closed wedge compression [facility]7 [facility] ([facility] 11/13/2018  Diabetes ([facility]  DVT (deep vein thrombosis) in pregnancy  GERD (gastroesophageal reflux disease)  Heart failure ([facility]  History of vascular access device 09/30/2020  
 4f midline, 12cm at 1cm out placed in L Cephalic by Andrew Rodriguez RN  
 HTN (hypertension)  NSTEMI (non-ST elevated myocardial infarction) ([facility] 10/9/2015  Obesity (BMI 30-39.9) 11/13/2018  Sleep apnea   
 wears CPAP Past Surgical History:  
Procedure Laterality Date  ABDOMEN SURGERY PROC UNLISTED    
 hital hernia repair, gall bladder removed  AMPUTATION TRANSMETACARPAL Right 06/12/2019  
 entire ring finger, 2nd & 3rd fingers (transmetacarpal)  BREAST SURGERY PROCEDURE UNLISTED    
 lumpectomy  CARDIAC SURG PROCEDURE UNLIST  10/9/15  
 2 stents [place]  HX COLOSTOMY    
 and reversal, post episiotomy repair [address] [place]  HX UROLOGICAL  2009  
 bladder sling Prior to Admission medications Medication Sig Start Date End Date Taking? Authorizing Provider  
tiotropium bromide (Spiriva Respimat) 2.5 mcg/actuation inhaler Take 2 Puffs by inhalation daily as needed. Yes Provider, Historical  
montelukast (Singulair) 10 mg tablet Take 10 mg by mouth daily. Yes Provider, Historical  
sAXagliptin (ONGLYZA) 5 mg tab tablet Take 5 mg by mouth daily. Yes Provider, Historical  
traMADoL (ULTRAM) 50 mg tablet Take 50 mg by mouth every six (6) hours as needed for Pain. Yes Provider, Historical  
metoprolol tartrate (LOPRESSOR) 25 mg tablet Take 25 mg by mouth daily as needed (For systolic >915).    Yes Provider, Historical  
acetaminophen (TYLENOL) 325 mg tablet Take 1 Tab by mouth every four (4) hours as needed for Pain. 3/31/20  Yes Dorcas Kirby MD  
magnesium oxide (MAG-OX) 400 mg tablet Take 400 mg by mouth nightly. Yes Provider, Historical  
potassium chloride SR (KLOR-CON 10) 10 mEq tablet Take 10 mEq by mouth two (2) times a day. Yes Provider, Historical  
predniSONE (DELTASONE) 10 mg tablet Take 10 mg by mouth two (2) times daily (with meals). Yes Provider, Historical  
Omeprazole delayed release (PRILOSEC D/R) 20 mg tablet Take 20 mg by mouth two (2) times a day. Yes Provider, Historical  
ibandronate (BONIVA) 150 mg tablet Take 150 mg by mouth every thirty (30) days. Yes Provider, Historical  
promethazine (PHENERGAN) 25 mg tablet Take 25 mg by mouth every eight (8) hours as needed for Nausea (Nausea not relieved by ondansetron). Yes Provider, Historical  
mirtazapine (REMERON) 15 mg tablet Take 15 mg by mouth nightly. Yes Provider, Historical  
albuterol (PROVENTIL VENTOLIN) 2.5 mg /3 mL (0.083 %) nebulizer solution 2.5 mg by Nebulization route every six (6) hours as needed for Wheezing or Shortness of Breath. Yes Provider, Historical  
albuterol (PROAIR HFA) 90 mcg/actuation inhaler Take 2 Puffs by inhalation every four (4) hours as needed. Yes Provider, Historical  
lactobacillus rhamnosus gg 10 billion cell (CULTURELLE) 10 billion cell capsule Take 1 Cap by mouth daily. Yes Provider, Historical  
biotin 10,000 mcg cap Take 1 Cap by mouth daily. Yes Provider, Historical  
DULoxetine (CYMBALTA) 60 mg capsule Take 60 mg by mouth daily. Yes Provider, Historical  
cholecalciferol, vitamin D3, (Vitamin D3) 50 mcg (2,000 unit) tab Take 2,000 Units by mouth daily. Yes Provider, Historical  
azelastine-fluticasone (DYMISTA) 137-50 mcg/spray spry 2 Sprays by Nasal route daily as needed (allergies). Yes Provider, Historical  
mometasone-formoterol (DULERA) 200-5 mcg/actuation HFA inhaler Take 2 Puffs by inhalation two (2) times daily as needed.    Yes Provider, Historical  
 
Current Facility-Administered Medications Medication Dose Route Frequency  arformoteroL (BROVANA) neb solution 15 mcg  15 mcg Nebulization BID RT  
 budesonide (PULMICORT) 500 mcg/2 ml nebulizer suspension  500 mcg Nebulization BID RT  
 hydrocortisone Sod Succ (PF) (SOLU-CORTEF) injection 100 mg  100 mg IntraVENous Q8H  
 ipratropium (ATROVENT) 0.02 % nebulizer solution 0.5 mg  0.5 mg Nebulization Q6H RT  
 dilTIAZem (CARDIZEM) 100 mg in 0.9% sodium chloride (MBP/ADV) 100 mL infusion  0-15 mg/hr IntraVENous TITRATE  metoprolol tartrate (LOPRESSOR) tablet 12.5 mg  12.5 mg Oral Q6H  
 digoxin (LANOXIN) tablet 0.125 mg  0.125 mg Oral DAILY  influenza vaccine 2020-21 (6 mos+)(PF) (FLUARIX/FLULAVAL/FLUZONE QUAD) injection 0.5 mL  0.5 mL IntraMUSCular PRIOR TO DISCHARGE  mirtazapine (REMERON) tablet 15 mg  15 mg Oral QHS  DULoxetine (CYMBALTA) capsule 60 mg  60 mg Oral DAILY  montelukast (SINGULAIR) tablet 10 mg  10 mg Oral DAILY  pantoprazole (PROTONIX) 40 mg in 0.9% sodium chloride 10 mL injection  40 mg IntraVENous Q12H  
 [Held by provider] furosemide (LASIX) tablet 20 mg  20 mg Oral DAILY  sodium chloride (NS) flush 5-40 mL  5-40 mL IntraVENous Q8H  
 insulin lispro (HUMALOG) injection   SubCUTAneous AC&HS Allergies Allergen Reactions  Advair Diskus [Fluticasone Propion-Salmeterol] Rash  Aspartame Other (comments)  Breo Ellipta [Fluticasone Furoate-Vilanterol] Rash  Bumex [Bumetanide] Myalgia  Ciprofloxacin Rash  Statins-Hmg-Coa Reductase Inhibitors Other (comments) Muscle pain Lipitor/crestor/zocor  Sulfa (Sulfonamide Antibiotics) Rash  Tetracycline Other (comments) musclepain  Torsemide Rash and Myalgia  Zetia [Ezetimibe] Myalgia Social History Tobacco Use  Smoking status: Former Smoker Last attempt to quit: 3/30/2017 Years since quitting: 3.5  Smokeless tobacco: Never Used Substance Use Topics  Alcohol use: No  
  Alcohol/week: 0.0 standard drinks Comment: very very rarely Family History Problem Relation Age of Onset  Diabetes Mother  Heart Failure Mother  Kidney Disease Mother  Diabetes Father  Heart Disease Father  Elevated Lipids Father  Heart Failure Father  Heart Disease Brother  Cancer Brother   
     kidney  Diabetes Brother  No Known Problems Brother  No Known Problems Brother Laboratory: I have personally reviewed the critical care flowsheet and labs. Recent Labs 10/04/20 
0009 10/03/20 
8800 10/02/20 
0703 WBC 15.2* 15.8* 17.6* HGB 7.9* 8.0* 8.8* HCT 27.4* 27.7* 29.6*  232 250 Recent Labs 10/04/20 
0009 10/03/20 
2901 10/02/20 
0703  137 142  
K 4.4 4.2 3.5  105 106 CO2 32 30 29 * 295* 137* BUN 20 17 19 CREA 1.28* 1.10* 1.11* CA 7.7* 7.3* 7.4* MG  --   --  2.0 ALB 1.8* 1.7* 1.9* ALT 31 32 36 Objective: Mode Rate Tidal Volume Pressure FiO2 PEEP Vital Signs:   
 TMAX(24) Intake/Output:  
Last shift:        
Last 3 shifts: No intake/output data recorded. Pramod Spencer Intake/Output Summary (Last 24 hours) at 10/4/2020 1057 Last data filed at 10/4/2020 0500 Gross per 24 hour Intake 300 ml Output 1650 ml Net -1350 ml EXAM:  
GENERAL: well developed and in no distress, HEENT:  PERRL, EOMI, no alar flaring or epistaxis, oral mucosa moist without cyanosis, NECK:  no jugular vein distention LUNGS: CTAB, respirations non-labored, HEART:  Regular rate and rhythm with no MGR; 2+ edema is present, ABDOMEN:  soft with no tenderness, bowel sounds present, EXTREMITIES:  warm with no cyanosis, SKIN:  no jaundice or ecchymosis and NEUROLOGIC:  alert and oriented, grossly non-focal 
 
Kristi Leon MD 
[facility]

## 2020-10-04 NOTE — PROGRESS NOTES
Daily Progress Note: 10/4/2020 Christiano Cota PCP: 
Donovan Gregg MD 
 
Assessment/Plan:  
Acute Syncopal Episode - likely due to low BP from anemia 
- monitor/tx as needed Hypotension - resolved 
- off pressors 10/2 CAD- stable CHF 
- Diuretics as needed Chronic A-fib with acute exacerbations of RVR 
- Holding Eliquis - Cards consulted - Metoprolol Acute on chronic anemia, with hemorrhagic shock 10/1/20 
- Transfuse as needed; Roland as needed to support BP 
- GI consult - colonoscopy planned at some point - currently on hold - PPI Dehydration 
- IVF 
 
DM2 
- SSI, Monitor BG ac and hs 
 
HAYES - CPAP Depression/Anxiety - Remeron and Cymbata Nondisplaced acute fracture L3 superior endplate. 
- poss kyphoplasty when more stable - Ortho has seen Problem List: 
Problem List as of 10/4/2020 Date Reviewed: 5/29/2020 Codes Class Noted - Resolved Syncope and collapse ICD-10-CM: R55 
ICD-9-CM: 780.2  9/29/2020 - Present Cellulitis of lower extremity ICD-10-CM: L03.119 ICD-9-CM: 682.6  3/23/2020 - Present ASO (arteriosclerosis obliterans) ICD-10-CM: I70.90 ICD-9-CM: 440.9  9/5/2019 - Present PVD (peripheral vascular disease) ([facility] ICD-10-CM: I73.9 ICD-9-CM: 443.9  8/1/2019 - Present Severe obesity ([facility] ICD-10-CM: E66.01 
ICD-9-CM: 278.01  7/30/2019 - Present UTI (urinary tract infection) ICD-10-CM: N39.0 ICD-9-CM: 599.0  7/13/2019 - Present COPD (chronic obstructive pulmonary disease) (HCC) ICD-10-CM: J44.9 ICD-9-CM: 270  7/13/2019 - Present Sepsis ([facility] ICD-10-CM: A41.9 ICD-9-CM: 038.9, 995.91  7/13/2019 - Present Normocytic anemia ICD-10-CM: D64.9 ICD-9-CM: 285.9  7/13/2019 - Present PAD (peripheral artery disease) (HCC) ICD-10-CM: I73.9 ICD-9-CM: 443.9  7/13/2019 - Present Mild intermittent asthma ICD-10-CM: J45.20 ICD-9-CM: 493.90  5/17/2019 - Present Gangrene of finger of right hand [facility]) ICD-10-CM: Y50 
ICD-9-CM: 785.4  5/17/2019 - Present Brachial artery thrombus (HCC) ICD-10-CM: W55.4 ICD-9-CM: 444.21  4/26/2019 - Present Sleep apnea ICD-10-CM: G47.30 ICD-9-CM: 780.57  1/5/2019 - Present Overview Signed 1/5/2019  8:41 PM by William Flores DO  
  wears CPAP Atrial fibrillation [facility]) ICD-10-CM: I48.91 
ICD-9-CM: 427.31  11/16/2018 - Present Obesity (BMI 30-39.9) (Chronic) ICD-10-CM: A89.6 ICD-9-CM: 278.00  11/13/2018 - Present Type 2 diabetes with nephropathy ([facility] ICD-10-CM: E11.21 
ICD-9-CM: 250.40, 583.81  10/1/2018 - Present Recurrent deep vein thrombosis (DVT) (HCC) ICD-10-CM: I82.409 ICD-9-CM: 453.40  3/30/2018 - Present Chronic anticoagulation (Chronic) ICD-10-CM: Z79.01 
ICD-9-CM: V58.61  3/30/2018 - Present Coronary artery disease involving native coronary artery without angina pectoris (Chronic) ICD-10-CM: I25.10 ICD-9-CM: 414.01  1/22/2016 - Present Essential hypertension (Chronic) ICD-10-CM: I10 
ICD-9-CM: 401.9  1/22/2016 - Present CAD (coronary artery disease) ICD-10-CM: I25.10 ICD-9-CM: 414.00  10/9/2015 - Present Type II diabetes mellitus (HCC) (Chronic) ICD-10-CM: E11.9 ICD-9-CM: 250.00  10/9/2015 - Present RESOLVED: Cellulitis ICD-10-CM: L03.90 ICD-9-CM: 682.9  3/23/2020 - 5/29/2020 RESOLVED: Hypokalemia ICD-10-CM: E87.6 ICD-9-CM: 276.8  3/23/2020 - 5/29/2020 RESOLVED: Hyponatremia ICD-10-CM: E87.1 ICD-9-CM: 276.1  3/23/2020 - 5/29/2020 RESOLVED: Diarrhea ICD-10-CM: R19.7 ICD-9-CM: 787.91  3/23/2020 - 5/29/2020 RESOLVED: Syncope and collapse ICD-10-CM: R55 
ICD-9-CM: 780.2  7/13/2019 - 5/29/2020 RESOLVED: Leg wound, left ICD-10-CM: D53.851T ICD-9-CM: 891.0  7/13/2019 - 5/29/2020 RESOLVED: Leg laceration, right, initial encounter ICD-10-CM: L16.351Y ICD-9-CM: 894.0  7/13/2019 - 5/29/2020 RESOLVED: Cellulitis of right upper arm ICD-10-CM: L03.113 ICD-9-CM: 682.3  5/17/2019 - 5/29/2020 RESOLVED: Bowel obstruction ([facility] ICD-10-CM: Z88.345 ICD-9-CM: 560.9  1/8/2019 - 5/29/2020 RESOLVED: Partial small bowel obstruction ([facility] ICD-10-CM: K56.600 ICD-9-CM: 560.9  1/5/2019 - 5/29/2020 RESOLVED: Dyspnea ICD-10-CM: R06.00 
ICD-9-CM: 786.09  11/13/2018 - 5/29/2020 RESOLVED: ARF (acute renal failure) (HCC) ICD-10-CM: N17.9 ICD-9-CM: 584.9  11/13/2018 - 5/29/2020 RESOLVED: Closed wedge compression fracture of T7 vertebra ([facility] ICD-10-CM: Z16.742L ICD-9-CM: 805.2  11/13/2018 - 5/29/2020 RESOLVED: Chest pain ICD-10-CM: R07.9 ICD-9-CM: 786.50  6/27/2018 - 5/29/2020 RESOLVED: Abdominal pain ICD-10-CM: R10.9 ICD-9-CM: 789.00  6/27/2018 - 5/29/2020 RESOLVED: HTN (hypertension) ICD-10-CM: I10 
ICD-9-CM: 401.9  10/9/2015 - 1/22/2016 RESOLVED: NSTEMI (non-ST elevated myocardial infarction) ([facility] ICD-10-CM: I21.4 ICD-9-CM: 410.70  10/9/2015 - 5/29/2020 HPI:  
 51-year-old female with multiple medical problems including obesity, sleep apnea, non-ST-elevation myocardial infarction, primary hypertension, congestive heart failure, gastroesophageal reflux disease, type 2 diabetes mellitus, temporal arteritis, fibromyalgia, atrial fibrillation and asthma, was brought to the emergency room from home for complaints of lightheadedness, dizziness with subsequent fall and low back pain. Patient stated that whilst preparing some dinner in the evening hours of 09/29/2020, had the onset of symptoms with subsequent fall in a sitting position. Thereafter, patient noted persistent low back pain. Patient denied any recent changes in medication. Denied headaches, visual deficits, chest pain, palpitations, sore throat, cough, shortness of breath, nausea, vomiting, fevers, chills, abdominal pain, bladder or bowel irregularities. (Dr Michelle Lee) : She is c/o pain in her back. Meds are helping. Hb 5.1 and she has been transfused with 2 units. AM labs pending and she will need a line as she is a difficult stick. May need more blood. She reports her stools have been dark so will ask GI to see. Holding Eliquis. She is on chronic steroids due to TA.  
 
10/1:  Heart rate up to 180s this AM - seems to have spiked up due to back pain but now back in 110s. No other complaint except back pain. No chest pain or SOB. Hb 7.1 this AM - watching. GI planning colonoscopy - starting prep. Poss kyphoplasty when more stable. 10/2:  Reports she feels \"just bad all over\" this AM.  Last night became hypotensive, dropped Hb again, required another transfusion of 1 unit, and Roland had to be started to maintain BP - remains on Roland this AM.  Also mult episodes of RVR of her A fib - Cards treating and Dilt drip started. Transferred to ICU. Hb up to 8.8 this AM.  Tmax 99. WBC up a little from previous -watching. 10/3:  Feeling much better this AM and sitting up eating breakfast.  Back in sinus rhythm with rate in 80s at this time. BP better. Off pressors. Off IV dilt today and on BB. Hb 8.0. Hopefully out of ICU later today or tomorrow. 10/4:  Continues to feel better. BP ok. Out of ICU today. Review of Systems: A comprehensive review of systems was negative except for that written in the HPI. Objective:  
Physical Exam:  
Visit Vitals BP (!) 134/51 Pulse 86 Temp 98.4 °F (36.9 °C) Resp 22 Ht 5' 7\" (1.702 m) Wt 114.7 kg (252 lb 13.9 oz) SpO2 92% BMI 39.60 kg/m² O2 Flow Rate (L/min): 2 l/min O2 Device: Room air Temp (24hrs), Av.3 °F (36.8 °C), Min:98.1 °F (36.7 °C), Max:98.6 °F (37 °C) No intake/output data recorded. 10/02 1901 - 10/04 0700 In: 1075.3 [P.O.:790; I.V.:285.3] Out: 1850 [[id number]] General:  Alert, cooperative, no distress, obese Head:  Normocephalic, without obvious abnormality, atraumatic. Eyes:  Conjunctivae/corneas clear. PERRL, EOMs intact. Throat: Lips, mucosa, and tongue normal. Teeth and gums normal.  
Neck: Supple, symmetrical, trachea midline, no adenopathy, thyroid: no enlargement/tenderness/nodules, no carotid bruit and no JVD. Back:   Symmetric, no curvature. ROM normal. No CVA tenderness. Lungs:   Clear to auscultation bilaterally. Chest wall:  No tenderness or deformity. Heart:  Currently in sinus rhythm with rate in 11T, no murmur, click, rub or gallop. Abdomen:   Soft, non-tender. Bowel sounds normal. No masses,  No organomegaly. Extremities: Extremities with weeping and swelling, mildly erythematous, dressed. No calf tenderness or cords. Pulses: 1+ and symmetric all extremities. Skin: Skin color, texture, turgor normal.   
Neurologic: CNII-XII intact. Alert and oriented X 3. Fine motor of hands and fingers normal.   equal.  No cogwheeling or rigidity. Gait not tested at this time. Sensation grossly normal to touch. Gross motor of extremities normal.    
 
Data Review:  
CT Head IMPRESSION: No acute findings. EXAM:  CT CERVICAL SPINE WITHOUT CONTRAST INDICATION: fall w head injury. COMPARISON: None. CONTRAST:  None. FINDINGS: 
Alignment is satisfactory, no acute fracture or dislocation. Moderate spondylosis and disc degeneration at C5-6 IMPRESSION 
 impression: No acute changes. CT LS spine Axial CT scan of the lumbar sacral spine obtained without contrast with coronal 
and sagittal reconstructions. No prior. CT dose reduction was achieved through 
the use of a standardized protocol tailored for this examination and automatic 
exposure control for dose modulation . Dillon Geller There is a minimally displaced fracture of the superior endplate of L3 without 
significant canal compromise. There is some mild diffuse spondylosis and disc degeneration. IMPRESSION 
 impression: Nondisplaced acute fracture L3 superior endplate.  
 
Recent Days: 
 Recent Labs 10/04/20 
0009 10/03/20 
1325 10/02/20 
0703 WBC 15.2* 15.8* 17.6* HGB 7.9* 8.0* 8.8* HCT 27.4* 27.7* 29.6*  232 250 Recent Labs 10/04/20 
0009 10/03/20 
9950 10/02/20 
0703  137 142  
K 4.4 4.2 3.5  105 106 CO2 32 30 29 * 295* 137* BUN 20 17 19 CREA 1.28* 1.10* 1.11* CA 7.7* 7.3* 7.4* MG  --   --  2.0 ALB 1.8* 1.7* 1.9* TBILI 1.1* 1.5* 2.5* ALT 31 32 36  
24 Hour Results: 
Recent Results (from the past 24 hour(s)) GLUCOSE, POC Collection Time: 10/03/20 11:38 AM  
Result Value Ref Range Glucose (POC) 314 (H) 65 - 100 mg/dL Performed by Shantel Cobia, POC Collection Time: 10/03/20  5:13 PM  
Result Value Ref Range Glucose (POC) 360 (H) 65 - 100 mg/dL Performed by Shantel Cobia, POC Collection Time: 10/03/20  9:02 PM  
Result Value Ref Range Glucose (POC) 311 (H) 65 - 100 mg/dL [facility] WITH AUTOMATED DIFF Collection Time: 10/04/20 12:09 AM  
Result Value Ref Range WBC 15.2 (H) 3.6 - 11.0 K/uL  
 RBC 3.15 (L) 3.80 - 5.20 M/uL HGB 7.9 (L) 11.5 - 16.0 g/dL HCT 27.4 (L) 35.0 - 47.0 % MCV 87.0 80.0 - 99.0 FL  
 MCH 25.1 (L) 26.0 - 34.0 PG  
 MCHC 28.8 (L) 30.0 - 36.5 g/dL RDW 20.9 (H) 11.5 - 14.5 % PLATELET 162 072 - 807 K/uL MPV 10.2 8.9 - 12.9 FL  
 NRBC 0.1 (H) 0  WBC ABSOLUTE NRBC 0.02 (H) 0.00 - 0.01 K/uL NEUTROPHILS 90 (H) 32 - 75 % LYMPHOCYTES 3 (L) 12 - 49 % MONOCYTES 6 5 - 13 % EOSINOPHILS 0 0 - 7 % BASOPHILS 0 0 - 1 % IMMATURE GRANULOCYTES 1 (H) 0.0 - 0.5 % ABS. NEUTROPHILS 13.6 (H) 1.8 - 8.0 K/UL  
 ABS. LYMPHOCYTES 0.5 (L) 0.8 - 3.5 K/UL  
 ABS. MONOCYTES 0.9 0.0 - 1.0 K/UL  
 ABS. EOSINOPHILS 0.0 0.0 - 0.4 K/UL  
 ABS. BASOPHILS 0.0 0.0 - 0.1 K/UL  
 ABS. IMM. GRANS. 0.2 (H) 0.00 - 0.04 K/UL  
 DF SMEAR SCANNED    
 RBC COMMENTS ANISOCYTOSIS 2+ 
    
 RBC COMMENTS HYPOCHROMIA 1+ 
    
 METABOLIC PANEL, COMPREHENSIVE Collection Time: 10/04/20 12:09 AM  
Result Value Ref Range Sodium 138 136 - 145 mmol/L Potassium 4.4 3.5 - 5.1 mmol/L Chloride 105 97 - 108 mmol/L  
 CO2 32 21 - 32 mmol/L Anion gap 1 (L) 5 - 15 mmol/L Glucose 253 (H) 65 - 100 mg/dL BUN 20 6 - 20 MG/DL Creatinine 1.28 (H) 0.55 - 1.02 MG/DL  
 BUN/Creatinine ratio 16 12 - 20 GFR est AA 50 (L) >60 ml/min/1.73m2 GFR est non-AA 41 (L) >60 ml/min/1.73m2 Calcium 7.7 (L) 8.5 - 10.1 MG/DL Bilirubin, total 1.1 (H) 0.2 - 1.0 MG/DL  
 ALT (SGPT) 31 12 - 78 U/L  
 AST (SGOT) 18 15 - 37 U/L Alk. phosphatase 197 (H) 45 - 117 U/L Protein, total 4.9 (L) 6.4 - 8.2 g/dL Albumin 1.8 (L) 3.5 - 5.0 g/dL Globulin 3.1 2.0 - 4.0 g/dL A-G Ratio 0.6 (L) 1.1 - 2.2 Medications reviewed Current Facility-Administered Medications Medication Dose Route Frequency  0.9% sodium chloride infusion 250 mL  250 mL IntraVENous PRN  
 arformoteroL (BROVANA) neb solution 15 mcg  15 mcg Nebulization BID RT  
 budesonide (PULMICORT) 500 mcg/2 ml nebulizer suspension  500 mcg Nebulization BID RT  
 hydrocortisone Sod Succ (PF) (SOLU-CORTEF) injection 100 mg  100 mg IntraVENous Q8H  
 ipratropium (ATROVENT) 0.02 % nebulizer solution 0.5 mg  0.5 mg Nebulization Q6H RT  
 dilTIAZem (CARDIZEM) 100 mg in 0.9% sodium chloride (MBP/ADV) 100 mL infusion  0-15 mg/hr IntraVENous TITRATE  metoprolol tartrate (LOPRESSOR) tablet 12.5 mg  12.5 mg Oral Q6H  
 digoxin (LANOXIN) tablet 0.125 mg  0.125 mg Oral DAILY  influenza vaccine 2020-21 (6 mos+)(PF) (FLUARIX/FLULAVAL/FLUZONE QUAD) injection 0.5 mL  0.5 mL IntraMUSCular PRIOR TO DISCHARGE  metoprolol tartrate (LOPRESSOR) tablet 25 mg  25 mg Oral DAILY PRN  
 mirtazapine (REMERON) tablet 15 mg  15 mg Oral QHS  DULoxetine (CYMBALTA) capsule 60 mg  60 mg Oral DAILY  montelukast (SINGULAIR) tablet 10 mg  10 mg Oral DAILY  pantoprazole (PROTONIX) 40 mg in 0.9% sodium chloride 10 mL injection  40 mg IntraVENous Q12H  
 HYDROcodone-acetaminophen (NORCO) 5-325 mg per tablet 1 Tab  1 Tab Oral Q4H PRN  
 [Held by provider] furosemide (LASIX) tablet 20 mg  20 mg Oral DAILY  morphine injection 1 mg  1 mg IntraVENous Q6H PRN  
 0.9% sodium chloride infusion 250 mL  250 mL IntraVENous PRN  
 sodium chloride (NS) flush 5-40 mL  5-40 mL IntraVENous Q8H  
 sodium chloride (NS) flush 5-40 mL  5-40 mL IntraVENous PRN  
 acetaminophen (TYLENOL) tablet 650 mg  650 mg Oral Q6H PRN Or  
 acetaminophen (TYLENOL) suppository 650 mg  650 mg Rectal Q6H PRN  polyethylene glycol (MIRALAX) packet 17 g  17 g Oral DAILY PRN  promethazine (PHENERGAN) tablet 12.5 mg  12.5 mg Oral Q6H PRN Or  
 ondansetron (ZOFRAN) injection 4 mg  4 mg IntraVENous Q6H PRN  
 glucose chewable tablet 16 g  4 Tab Oral PRN  
 dextrose (D50W) injection syrg 12.5-25 g  25-50 mL IntraVENous PRN  
 glucagon (GLUCAGEN) injection 1 mg  1 mg IntraMUSCular PRN  
 insulin lispro (HUMALOG) injection   SubCUTAneous AC&HS  
 0.9% sodium chloride infusion 250 mL  250 mL IntraVENous PRN Care Plan discussed with: Patient and Nurse Total time spent with patient and review of records: 30 minutes.  
Stephen Trivedi MD

## 2020-10-04 NOTE — PROGRESS NOTES
..0700 Bedside shift change report given to Ricarda AJ (oncoming nurse) by Nani Fallon RN (offgoing nurse). Report included the following information SBAR, Recent Results, Med Rec Status, Cardiac Rhythm NSR and Dual Neuro Assessment. 0800 Morning Assessment Neuro A&0 x 3 Cardiac NSR Respiratory 2L NC and CPAP overnight GI Cardiac diet  Purewick Skin Wounds on legs, excoriation on lindsay area, R H missing fingers. IV R AC #20 SL 
0830 Assessment. Pt complaint 6/10 pain for mid back. 1000 Pain relieved and pt resting quietly. 1200 Reassessed. VSS. Pt eating lunch and pain relieved. 1400 Repositioned. Resting. VSS.  
1600 Reassessed. No complaint of pain. VSS. Patient has transfer orders awaiting bed. 1800 Straight cath for 600ml. Pt has 886 on Bladder Scan. Replaced purewick. VSS. Awaiting bed.  
1900 . Newton Rollins Bedside shift change report given to [place] (oncoming nurse) by Gian Maguire RN (offgoing nurse). Report included the following information SBAR, MAR, Med Rec Status, Cardiac Rhythm NSR and Dual Neuro Assessment.

## 2020-10-04 NOTE — PROGRESS NOTES
1900: Bedside and Verbal shift change report given to Mahnaz SKINNER RN (oncoming nurse) by Shyla Cheung RN (offgoing nurse). Report included the following information SBAR, Kardex, Intake/Output, Recent Results, Cardiac Rhythm SR and Alarm Parameters . Primary Nurse Agustina Samuels RN and Shyla Cheung, RN performed a dual skin assessment on this patient Impairment noted- see wound doc flow sheet. Darrion score is 14.  
2000: Shift  Assessment completed. Pt in bed watching TV. Pt repositioned. Mental Status: A/Ox4 Respiratory: Lungs diminished on 2L NC Cardiac: SR/ST on the monitor. GI/: Active bowel sounds, purewick in place. IV Lines/Drips: SUZE midline, TKO 10ml/hr 
2200: Pt repositioned. 0000: Reassessment completed. No changes from previous assessment. Pt repositioned. Pt had pulled her midline while sleeping, new IV started in RAC. Labs drawn. 0200: Pt repositioned. PRN morphine given for arm pain. 0400: Reassessment completed. No changes from previous assessment. Pt repositioned. Kendal care completed with purewick change, pt refused full bath.   
0600: Pt repositioned. 0700: Bedside and Verbal shift change report given to Vijaya (oncoming nurse) by Vikas Laurent RN (offgoing nurse). Report included the following information SBAR, Kardex, Intake/Output, Recent Results, Cardiac Rhythm SR and Alarm Parameters .

## 2020-10-04 NOTE — PROGRESS NOTES
1900: Bedside and Verbal shift change report given to Carson Hernandez RN (oncoming nurse) by Janet Galeas RN (offgoing nurse). Report included the following information SBAR, Intake/Output, MAR, Med Rec Status, Cardiac Rhythm NSR, Alarm Parameters  and Quality Measures. 1945: Shift assessment completed. A&Ox3, RUIZ. Currently on 2L via NC. No s/s of distress. S1S2 present, NSR on monitor. VSS. BS active x4. Purwick in place, no output at this time. Foam dressings noted to lower extremities, wound care completed every other day. #20G to R AC, saline locked. Flushed and capped. On contact precautions for EBSL in the urine and MRSA in wounds. Able to make needs known. Currently sitting up in bed watching tv, refuses to be turned at this time. States that she was just turned. Call bell in reach. Will cont to monitor. 0000: Reassessment completed. No changes noted. NAD. Pt alert, watching tv. Turned and repositioned. CPAP mask placed on pt. Call bell in reach. Will cont to monitor. 0400: Reassessment completed. No changes noted. Labs drawn per MD orders. Pt refused to be turned at this time. No s/s of distress. Call bell in reach. Will continue to monitor. 0700: Bedside and Verbal shift change report given to MADAI Alston (oncoming nurse) by [address], RN (offgoing nurse). Report included the following information SBAR, Kardex, MAR, Med Rec Status, Cardiac Rhythm NSR, Alarm Parameters  and Quality Measures.

## 2020-10-05 NOTE — ROUTINE PROCESS
Bedside, Verbal and Written shift change report given to Don Villanueva (oncoming nurse) by Jennifer Higginbotham (offgoing nurse). Report included the following information SBAR, Kardex, Intake/Output, MAR and Recent Results.

## 2020-10-05 NOTE — PROGRESS NOTES
Problem: Self Care Deficits Care Plan (Adult) Goal: *Acute Goals and Plan of Care (Insert Text) Description:  
FUNCTIONAL STATUS PRIOR TO ADMISSION: Patient was modified independent using a rollator for functional mobility. Patient reports short distances only and requires lots of seated breaks on rollator. She states she manages her care independently but admits to difficulty with LB tasks (putting on socks and bathing LB). HOME SUPPORT PRIOR TO ADMISSION: The patient lived alone. Daughter is nearby. Reports she calls for security at [facility] when she needs help. Per pt report her and her daughter are looking into hiring private care aides for when she discharges. Occupational Therapy Goals Initiated 10/5/2020 1. Patient will perform grooming with modified independence from unsupported seated position within 7 day(s). 2.  Patient will perform upper body dressing and bathing with modified independence within 7 day(s). 3.  Patient will perform lower body dressing and bathing with moderate assistance using AE PRN within 7 day(s). 4.  Patient will perform toilet transfers to [facility] with moderate assistance  within 7 day(s). 5.  Patient will perform all aspects of toileting with moderate assistance  within 7 day(s). 6.  Patient will participate in upper extremity therapeutic exercise/activities with supervision/set-up for 10 minutes within 7 day(s). 7.  Patient will utilize energy conservation techniques during functional activities with verbal cues within 7 day(s). Outcome: Progressing Towards Goal 
 OCCUPATIONAL THERAPY EVALUATION Patient: Donovan Rendon (89 y.o. female) Date: 10/5/2020 Primary Diagnosis: Syncope and collapse [R55] Procedure(s) (LRB): ESOPHAGOGASTRODUODENOSCOPY (EGD) (N/A) COLONOSCOPY (N/A) Precautions:  Fall, Back, Skin ASSESSMENT Based on the objective data described below, the patient presents with decreased activity tolerance, generalized weakness, impaired balance, and severe back pain with any movement following admission for syncope and fall. Patient found to have a L3 compression fx that is currently being treated conservatively per Orthopedic services. Patient's hospital course complicated d/t transfer to ICU for Afib with RVR. She was seen today following transfer out of the unit (to the 5th floor). All vitals stable with activity. Patient was able to come to sitting EOB with max A x2. She was able to tolerate ~2 minutes of sitting before requiring return to supine d/t severe back pain, rated 10/10 with movement. Patient engaged in bed level ADLs with poor tolerance d/t pain. She requires increased time, cues, and physical assist for ADLs. Patient would benefit from continued skilled OT to progress towards goals and improve overall independence. Current Level of Function Impacting Discharge (ADLs/self-care): Patient required max A x2 for bed mobility with poor sitting balance when sitting EOB. She was unable to progress to OOB transfers or scoot to [facility] d/t increased pain. Patient required setup to min A for UB ADLs and required max to total A for LB ADLs. Functional Outcome Measure: The patient scored Total: 30/100 on the Barthel Index outcome measure. Patient will benefit from skilled therapy intervention to address the above noted impairments. PLAN : 
Recommendations and Planned Interventions: self care training, functional mobility training, therapeutic exercise, balance training, therapeutic activities, endurance activities, patient education, home safety training, and family training/education Frequency/Duration: Patient will be followed by occupational therapy 5 times a week to address goals. Recommendation for discharge: (in order for the patient to meet his/her long term goals) Therapy up to 5 days/week in SNF setting This discharge recommendation: Has been made in collaboration with the attending provider and/or case management IF patient discharges home will need the following DME: none SUBJECTIVE:  
Patient stated I can't do anything with this back pain; They (orthopedic MD/PA) need to come see me and do something about this.  OBJECTIVE DATA SUMMARY:  
HISTORY:  
Past Medical History:  
Diagnosis Date Asthma Atrial fibrillation ([facility] [address] 11/16/2018 Autoimmune disease ([facility] [address]   
 fibromyalgia CAD (coronary artery disease) 10/9/2015 Closed wedge compression fracture of T7 vertebra ([facility] 11/13/2018 Diabetes ([facility] [address] DVT (deep vein thrombosis) in pregnancy GERD (gastroesophageal reflux disease) Heart failure ([facility] [address] History of vascular access device 09/30/2020  
 4f midline, 12cm at 1cm out placed in L Cephalic by Virginia Stevenson RN  
 HTN (hypertension) NSTEMI (non-ST elevated [facility] infarction) [facility] 10/9/2015 Obesity (BMI 30-39.9) 11/13/2018 Sleep apnea   
 wears CPAP Past Surgical History:  
Procedure Laterality Date ABDOMEN SURGERY PROC UNLISTED    
 hital hernia repair, gall bladder removed AMPUTATION TRANSMETACARPAL Right 06/12/2019  
 entire ring finger, 2nd & 3rd fingers (transmetacarpal) BREAST SURGERY PROCEDURE UNLISTED    
 lumpectomy CARDIAC SURG PROCEDURE UNLIST  10/9/15  
 2 stents [facility] HX COLOSTOMY    
 and reversal, post episiotomy repair [address] HX UROLOGICAL  2009  
 bladder sling Expanded or extensive additional review of patient history:  
 
Home Situation Home Environment: Independent living # Steps to Enter: 0 One/Two Story Residence: One story Living Alone: Yes Support Systems: Family member(s) Patient Expects to be Discharged to[de-identified] [facility] Current DME Used/Available at Home: Cane, straight, Walker, rolling, Walker, rollator, CPAP, Wheelchair, Grab bars, Raised toilet seat, Shower chair Tub or Shower Type: Shower Hand dominance: Right EXAMINATION OF PERFORMANCE DEFICITS: 
Cognitive/Behavioral Status: 
Neurologic State: Alert Orientation Level: Oriented X4 Cognition: Appropriate decision making; Appropriate for age attention/concentration; Appropriate safety awareness Perception: Appears intact Perseveration: No perseveration noted Safety/Judgement: Awareness of environment Skin: Intact in the uppers Edema: None noted in the uppers Hearing: Auditory Auditory Impairment: Hard of hearing, bilateral 
 
Vision/Perceptual:   
Tracking: Able to track stimulus in all quadrants w/o difficulty Diplopia: No   
Acuity: Within Defined Limits Corrective Lenses: Reading glasses Range of Motion: 
AROM: Generally decreased, functional in the uppers PROM: Generally decreased, functional in the uppers Strength: 
Strength: Generally decreased, functional in the uppers Coordination: 
Fine Motor Skills-Upper: Left Intact; Right Intact Gross Motor Skills-Upper: Left Intact; Right Intact Tone & Sensation: 
Tone: normal 
Sensation: intact Balance: 
Sitting: Impaired Sitting - Static: Poor (constant support) Sitting - Dynamic: Poor (constant support) Standing: (unable to assess) Functional Mobility and Transfers for ADLs: 
Bed Mobility: 
Rolling: Maximum assistance;Assist x2 Supine to Sit: Maximum assistance;Assist x2 Sit to Supine: Maximum assistance;Assist x2 Scooting: Maximum assistance;Assist x2 Transfers: 
Sit to Stand: (declined to sit to stand too painful on her back) ADL Assessment: 
Feeding: Setup Oral Facial Hygiene/Grooming: Setup Bathing: Maximum assistance Upper Body Dressing: Minimum assistance Lower Body Dressing: Total assistance Toileting: Total assistance Cognitive Retraining Safety/Judgement: Awareness of environment Functional Measure: 
Barthel Index: 
 
Bathin Bladder: 10 Bowels: 10 
Groomin Dressin Feedin Mobility: 0 Stairs: 0 Toilet Use: 0 Transfer (Bed to Chair and Back): 5 Total: 30/100 The Barthel ADL Index: Guidelines 1. The index should be used as a record of what a patient does, not as a record of what a patient could do. 2. The main aim is to establish degree of independence from any help, physical or verbal, however minor and for whatever reason. 3. The need for supervision renders the patient not independent. 4. A patient's performance should be established using the best available evidence. Asking the patient, friends/relatives and nurses are the usual sources, but direct observation and common sense are also important. However direct testing is not needed. 5. Usually the patient's performance over the preceding 24-48 hours is important, but occasionally longer periods will be relevant. 6. Middle categories imply that the patient supplies over 50 per cent of the effort. 7. Use of aids to be independent is allowed. Amor Walsh., Barthel, D.W. ([id number]). Functional evaluation: the Barthel Index. [facility] (14)2. DAMI Argueta, [place]., Ovi Laboy., [place], [address] [place] [address] (1999). Measuring the change indisability after inpatient rehabilitation; comparison of the responsiveness of the Barthel Index and Functional [place] Measure. Journal of Neurology, Neurosurgery, and Psychiatry, 66(4), 603-653. PARRISH Eldridge, SANIYA Parry, & Lilia Jones M.A. (2004.) Assessment of post-stroke quality of life in cost-effectiveness studies: The usefulness of the Barthel Index and the EuroQoL-5D. [facility], 13, [phone] Occupational Therapy Evaluation Charge Determination History Examination Decision-Making LOW Complexity : Brief history review  LOW Complexity : 1-3 performance deficits relating to physical, cognitive , or psychosocial skils that result in activity limitations and / or participation restrictions  LOW Complexity : No comorbidities that affect functional and no verbal or physical assistance needed to complete eval tasks Based on the above components, the patient evaluation is determined to be of the following complexity level: LOW Activity Tolerance:  
Poor Please refer to the flowsheet for vital signs taken during this treatment. After treatment patient left in no apparent distress:   
Supine in bed, Call bell within reach, and Bed / chair alarm activated COMMUNICATION/EDUCATION:  
The patients plan of care was discussed with: Physical therapist, Registered nurse, and patient . Home safety education was provided and the patient/caregiver indicated understanding., Patient/family have participated as able in goal setting and plan of care. , and Patient/family agree to work toward stated goals and plan of care. This patients plan of care is appropriate for delegation to \[facility]\"". Thank you for this referral. 
RAMYA Mckeon/L Time Calculation: 37 mins

## 2020-10-05 NOTE — PROGRESS NOTES
Problem: Risk for Spread of Infection Goal: Prevent transmission of infectious organism to others Description: Prevent the transmission of infectious organisms to other patients, staff members, and visitors. Outcome: Progressing Towards Goal 
  
Problem: Patient Education:  Go to Education Activity Goal: Patient/Family Education Outcome: Progressing Towards Goal 
  
Problem: Falls - Risk of 
Goal: *Absence of Falls Description: Document Olamide Lin Fall Risk and appropriate interventions in the flowsheet. Outcome: Progressing Towards Goal 
Note: Fall Risk Interventions: 
Mobility Interventions: Bed/chair exit alarm, Communicate number of staff needed for ambulation/transfer, Patient to call before getting OOB, OT consult for ADLs, PT Consult for mobility concerns, Strengthening exercises (ROM-active/passive) Medication Interventions: Assess postural VS orthostatic hypotension, Bed/chair exit alarm, Evaluate medications/consider consulting pharmacy, Patient to call before getting OOB, Teach patient to arise slowly Elimination Interventions: Bed/chair exit alarm, Call light in reach, Patient to call for help with toileting needs, Toileting schedule/hourly rounds History of Falls Interventions: Bed/chair exit alarm, Consult care management for discharge planning, Room close to nurse's station Problem: Patient Education: Go to Patient Education Activity Goal: Patient/Family Education Outcome: Progressing Towards Goal 
  
Problem: Diabetes Self-Management Goal: *Disease process and treatment process Description: Define diabetes and identify own type of diabetes; list 3 options for treating diabetes. Outcome: Progressing Towards Goal 
Goal: *Incorporating nutritional management into lifestyle Description: Describe effect of type, amount and timing of food on blood glucose; list 3 methods for planning meals.  
Outcome: Progressing Towards Goal 
 Goal: *Monitoring blood glucose, interpreting and using results Description: Identify recommended blood glucose targets  and personal targets. Outcome: Progressing Towards Goal 
Goal: *Prevention, detection, treatment of acute complications Description: List symptoms of hyper- and hypoglycemia; describe how to treat low blood sugar and actions for lowering  high blood glucose level. Outcome: Progressing Towards Goal 
  
Problem: Patient Education: Go to Patient Education Activity Goal: Patient/Family Education Outcome: Progressing Towards Goal

## 2020-10-05 NOTE — PROGRESS NOTES
Daily Progress Note: 10/5/2020 Azucena Deluca PCP: 
Divine Persaud MD 
 
Assessment/Plan:  
Acute Syncopal Episode - likely due to low BP from anemia 
- monitor/tx as needed Hypotension - resolved 
- off pressors 10/2 CAD- stable CHF 
- Diuretics as needed Chronic A-fib with acute exacerbations of RVR 
- Holding Eliquis - Cards consulted - Metoprolol Acute on chronic anemia, with hemorrhagic shock 10/1/20 
- Transfuse as needed; Roland as needed to support BP 
- GI consult - colonoscopy planned at some point - currently on hold - PPI Dehydration 
- IVF 
 
DM2 
- SSI, Monitor BG ac and hs 
 
HAYES - CPAP Depression/Anxiety - Remeron and Cymbata Nondisplaced acute fracture L3 superior endplate. 
- poss kyphoplasty when more stable - Ortho has seen Problem List: 
Problem List as of 10/5/2020 Date Reviewed: 5/29/2020 Codes Class Noted - Resolved Syncope and collapse ICD-10-CM: R55 
ICD-9-CM: 780.2  9/29/2020 - Present Cellulitis of lower extremity ICD-10-CM: L03.119 ICD-9-CM: 682.6  3/23/2020 - Present ASO (arteriosclerosis obliterans) ICD-10-CM: I70.90 ICD-9-CM: 440.9  9/5/2019 - Present PVD (peripheral vascular disease) ([facility].) ICD-10-CM: I73.9 ICD-9-CM: 443.9  8/1/2019 - Present Severe obesity ([facility] ICD-10-CM: E66.01 
ICD-9-CM: 278.01  7/30/2019 - Present UTI (urinary tract infection) ICD-10-CM: N39.0 ICD-9-CM: 599.0  7/13/2019 - Present COPD (chronic obstructive pulmonary disease) (HCC) ICD-10-CM: J44.9 ICD-9-CM: 846  7/13/2019 - Present Sepsis ([facility] ICD-10-CM: A41.9 ICD-9-CM: 038.9, 995.91  7/13/2019 - Present Normocytic anemia ICD-10-CM: D64.9 ICD-9-CM: 285.9  7/13/2019 - Present PAD (peripheral artery disease) (HCC) ICD-10-CM: I73.9 ICD-9-CM: 443.9  7/13/2019 - Present Mild intermittent asthma ICD-10-CM: J45.20 ICD-9-CM: 493.90  5/17/2019 - Present Gangrene of finger of right hand [facility]) ICD-10-CM: Q87 
ICD-9-CM: 785.4  5/17/2019 - Present Brachial artery thrombus (HCC) ICD-10-CM: X35.6 ICD-9-CM: 444.21  4/26/2019 - Present Sleep apnea ICD-10-CM: G47.30 ICD-9-CM: 780.57  1/5/2019 - Present Overview Signed 1/5/2019  8:41 PM by Edmundo Quintanilla DO  
  wears CPAP Atrial fibrillation [facility]) ICD-10-CM: I48.91 
ICD-9-CM: 427.31  11/16/2018 - Present Obesity (BMI 30-39.9) (Chronic) ICD-10-CM: T70.6 ICD-9-CM: 278.00  11/13/2018 - Present Type 2 diabetes with nephropathy ([facility] ICD-10-CM: E11.21 
ICD-9-CM: 250.40, 583.81  10/1/2018 - Present Recurrent deep vein thrombosis (DVT) (HCC) ICD-10-CM: I82.409 ICD-9-CM: 453.40  3/30/2018 - Present Chronic anticoagulation (Chronic) ICD-10-CM: Z79.01 
ICD-9-CM: V58.61  3/30/2018 - Present Coronary artery disease involving native coronary artery without angina pectoris (Chronic) ICD-10-CM: I25.10 ICD-9-CM: 414.01  1/22/2016 - Present Essential hypertension (Chronic) ICD-10-CM: I10 
ICD-9-CM: 401.9  1/22/2016 - Present CAD (coronary artery disease) ICD-10-CM: I25.10 ICD-9-CM: 414.00  10/9/2015 - Present Type II diabetes mellitus (HCC) (Chronic) ICD-10-CM: E11.9 ICD-9-CM: 250.00  10/9/2015 - Present RESOLVED: Cellulitis ICD-10-CM: L03.90 ICD-9-CM: 682.9  3/23/2020 - 5/29/2020 RESOLVED: Hypokalemia ICD-10-CM: E87.6 ICD-9-CM: 276.8  3/23/2020 - 5/29/2020 RESOLVED: Hyponatremia ICD-10-CM: E87.1 ICD-9-CM: 276.1  3/23/2020 - 5/29/2020 RESOLVED: Diarrhea ICD-10-CM: R19.7 ICD-9-CM: 787.91  3/23/2020 - 5/29/2020 RESOLVED: Syncope and collapse ICD-10-CM: R55 
ICD-9-CM: 780.2  7/13/2019 - 5/29/2020 RESOLVED: Leg wound, left ICD-10-CM: G82.985I ICD-9-CM: 891.0  7/13/2019 - 5/29/2020 RESOLVED: Leg laceration, right, initial encounter ICD-10-CM: P20.951N ICD-9-CM: 894.0  7/13/2019 - 5/29/2020 RESOLVED: Cellulitis of right upper arm ICD-10-CM: L03.113 ICD-9-CM: 682.3  5/17/2019 - 5/29/2020 RESOLVED: Bowel obstruction ([facility] ICD-10-CM: D14.757 ICD-9-CM: 560.9  1/8/2019 - 5/29/2020 RESOLVED: Partial small bowel obstruction ([facility].) ICD-10-CM: K56.600 ICD-9-CM: 560.9  1/5/2019 - 5/29/2020 RESOLVED: Dyspnea ICD-10-CM: R06.00 
ICD-9-CM: 786.09  11/13/2018 - 5/29/2020 RESOLVED: ARF (acute renal failure) (HCC) ICD-10-CM: N17.9 ICD-9-CM: 584.9  11/13/2018 - 5/29/2020 RESOLVED: Closed wedge compression fracture of T7 vertebra ([facility] ICD-10-CM: Z75.666S ICD-9-CM: 805.2  11/13/2018 - 5/29/2020 RESOLVED: Chest pain ICD-10-CM: R07.9 ICD-9-CM: 786.50  6/27/2018 - 5/29/2020 RESOLVED: Abdominal pain ICD-10-CM: R10.9 ICD-9-CM: 789.00  6/27/2018 - 5/29/2020 RESOLVED: HTN (hypertension) ICD-10-CM: I10 
ICD-9-CM: 401.9  10/9/2015 - 1/22/2016 RESOLVED: NSTEMI (non-ST elevated myocardial infarction) ([facility] ICD-10-CM: I21.4 ICD-9-CM: 410.70  10/9/2015 - 5/29/2020 HPI:  
 62-year-old female with multiple medical problems including obesity, sleep apnea, non-ST-elevation myocardial infarction, primary hypertension, congestive heart failure, gastroesophageal reflux disease, type 2 diabetes mellitus, temporal arteritis, fibromyalgia, atrial fibrillation and asthma, was brought to the emergency room from home for complaints of lightheadedness, dizziness with subsequent fall and low back pain. Patient stated that whilst preparing some dinner in the evening hours of 09/29/2020, had the onset of symptoms with subsequent fall in a sitting position. Thereafter, patient noted persistent low back pain. Patient denied any recent changes in medication. Denied headaches, visual deficits, chest pain, palpitations, sore throat, cough, shortness of breath, nausea, vomiting, fevers, chills, abdominal pain, bladder or bowel irregularities. (Dr Severa Foerster) : She is c/o pain in her back. Meds are helping. Hb 5.1 and she has been transfused with 2 units. AM labs pending and she will need a line as she is a difficult stick. May need more blood. She reports her stools have been dark so will ask GI to see. Holding Eliquis. She is on chronic steroids due to TA.  
 
10/1:  Heart rate up to 180s this AM - seems to have spiked up due to back pain but now back in 110s. No other complaint except back pain. No chest pain or SOB. Hb 7.1 this AM - watching. GI planning colonoscopy - starting prep. Poss kyphoplasty when more stable. 10/2:  Reports she feels \"just bad all over\" this AM.  Last night became hypotensive, dropped Hb again, required another transfusion of 1 unit, and Roland had to be started to maintain BP - remains on Roland this AM.  Also mult episodes of RVR of her A fib - Cards treating and Dilt drip started. Transferred to ICU. Hb up to 8.8 this AM.  Tmax 99. WBC up a little from previous -watching. 10/3:  Feeling much better this AM and sitting up eating breakfast.  Back in sinus rhythm with rate in 80s at this time. BP better. Off pressors. Off IV dilt today and on BB. Hb 8.0. Hopefully out of ICU later today or tomorrow. 10/4:  Continues to feel better. BP ok. Out of ICU today. 10/5:  Little change from yesterday. No new complaints. Still c/o back pain with movement. She may be stable enough for the GI work up and for Kyphoplasty - per GI and IR. Hb 8.0. Review of Systems: A comprehensive review of systems was negative except for that written in the HPI. Objective:  
Physical Exam:  
Visit Vitals BP (!) 141/76 Pulse 85 Temp 98.2 °F (36.8 °C) Resp 19 Ht 5' 7\" (1.702 m) Wt 114.4 kg (252 lb 3.3 oz) SpO2 98% BMI 39.50 kg/m² O2 Flow Rate (L/min): 2 l/min O2 Device: CPAP mask Temp (24hrs), Av.4 °F (36.9 °C), Min:98.2 °F (36.8 °C), Max:98.6 °F (37 °C) 10/04 1901 - 10/05 0700 In: -  
 Out: 175 [Urine:175]   10/03 0701 - 10/04 1900 In: 640 [P.O.:640] Out: 2250 [Urine:2250] General:  Alert, cooperative, no distress, obese Head:  Normocephalic, without obvious abnormality, atraumatic. Eyes:  Conjunctivae/corneas clear. PERRL, EOMs intact. Throat: Lips, mucosa, and tongue normal. Teeth and gums normal.  
Neck: Supple, symmetrical, trachea midline, no adenopathy, thyroid: no enlargement/tenderness/nodules, no carotid bruit and no JVD. Back:   Symmetric, no curvature. ROM normal. No CVA tenderness. Lungs:   Clear to auscultation bilaterally. Chest wall:  No tenderness or deformity. Heart:  Currently in sinus rhythm with rate in 84T, no murmur, click, rub or gallop. Abdomen:   Soft, non-tender. Bowel sounds normal. No masses,  No organomegaly. Extremities: Extremities with weeping and swelling, mildly erythematous, dressed. No calf tenderness or cords. Pulses: 1+ and symmetric all extremities. Skin: Skin color, texture, turgor normal.   
Neurologic:   Alert and oriented X 3. Fine motor of hands and fingers normal.   equal.  No cogwheeling or rigidity. Gait not tested at this time. Sensation grossly normal to touch. Gross motor of extremities normal.    
 
Data Review:  
CT Head IMPRESSION: No acute findings. EXAM:  CT CERVICAL SPINE WITHOUT CONTRAST INDICATION: fall w head injury. COMPARISON: None. CONTRAST:  None. FINDINGS: 
Alignment is satisfactory, no acute fracture or dislocation. Moderate spondylosis and disc degeneration at C5-6 IMPRESSION 
 impression: No acute changes. CT LS spine Axial CT scan of the lumbar sacral spine obtained without contrast with coronal 
and sagittal reconstructions. No prior. CT dose reduction was achieved through 
the use of a standardized protocol tailored for this examination and automatic 
exposure control for dose modulation . Cheryl Howard There is a minimally displaced fracture of the superior endplate of L3 without significant canal compromise. There is some mild diffuse spondylosis and disc degeneration. IMPRESSION 
 impression: Nondisplaced acute fracture L3 superior endplate. Recent Days: 
Recent Labs 10/05/20 
8736 10/04/20 
0009 10/03/20 
4714 WBC 10.2 15.2* 15.8* HGB 8.0* 7.9* 8.0*  
HCT 29.3* 27.4* 27.7*  
 246 232 Recent Labs 10/05/20 
7175 10/04/20 
0009 10/03/20 
5730 10/02/20 
0703  138 137 142  
K 4.4 4.4 4.2 3.5  105 105 106 CO2 30 32 30 29 * 253* 295* 137* BUN 28* 20 17 19 CREA 1.15* 1.28* 1.10* 1.11* CA 8.0* 7.7* 7.3* 7.4* MG  --   --   --  2.0 ALB 2.0* 1.8* 1.7* 1.9* TBILI 0.8 1.1* 1.5* 2.5* ALT 28 31 32 36  
24 Hour Results: 
Recent Results (from the past 24 hour(s)) GLUCOSE, POC Collection Time: 10/04/20  9:09 AM  
Result Value Ref Range Glucose (POC) 207 (H) 65 - 100 mg/dL Performed by [employer] [place] GLUCOSE, POC Collection Time: 10/04/20 12:40 PM  
Result Value Ref Range Glucose (POC) 281 (H) 65 - 100 mg/dL Performed by Loy Milch GLUCOSE, POC Collection Time: 10/04/20  4:08 PM  
Result Value Ref Range Glucose (POC) 279 (H) 65 - 100 mg/dL Performed by Loy Milch GLUCOSE, POC Collection Time: 10/04/20  9:18 PM  
Result Value Ref Range Glucose (POC) 312 (H) 65 - 100 mg/dL Performed by Catina Lanza Collection Time: 10/05/20  4:17 AM  
Result Value Ref Range Digoxin level 1.1 0.90 - 2.00 NG/ML  
CBC WITH AUTOMATED DIFF Collection Time: 10/05/20  4:17 AM  
Result Value Ref Range WBC 10.2 3.6 - 11.0 K/uL  
 RBC 3.29 (L) 3.80 - 5.20 M/uL HGB 8.0 (L) 11.5 - 16.0 g/dL HCT 29.3 (L) 35.0 - 47.0 % MCV 89.1 80.0 - 99.0 FL  
 MCH 24.3 (L) 26.0 - 34.0 PG  
 MCHC 27.3 (L) 30.0 - 36.5 g/dL RDW 21.5 (H) 11.5 - 14.5 % PLATELET 760 033 - 104 K/uL MPV 9.9 8.9 - 12.9 FL  
 NRBC 0.0 0  WBC ABSOLUTE NRBC 0.00 0.00 - 0.01 K/uL NEUTROPHILS 87 (H) 32 - 75 % LYMPHOCYTES 5 (L) 12 - 49 % MONOCYTES 7 5 - 13 % EOSINOPHILS 0 0 - 7 % BASOPHILS 0 0 - 1 % IMMATURE GRANULOCYTES 1 (H) 0.0 - 0.5 % ABS. NEUTROPHILS 8.9 (H) 1.8 - 8.0 K/UL  
 ABS. LYMPHOCYTES 0.5 (L) 0.8 - 3.5 K/UL  
 ABS. MONOCYTES 0.7 0.0 - 1.0 K/UL  
 ABS. EOSINOPHILS 0.0 0.0 - 0.4 K/UL  
 ABS. BASOPHILS 0.0 0.0 - 0.1 K/UL  
 ABS. IMM. GRANS. 0.1 (H) 0.00 - 0.04 K/UL  
 DF SMEAR SCANNED    
 RBC COMMENTS ANISOCYTOSIS 2+ 
    
 RBC COMMENTS HYPOCHROMIA 1+ 
    
 RBC COMMENTS TARGET CELLS 1+ WBC COMMENTS TOXIC GRANULATION    
METABOLIC PANEL, COMPREHENSIVE Collection Time: 10/05/20  4:17 AM  
Result Value Ref Range Sodium 140 136 - 145 mmol/L Potassium 4.4 3.5 - 5.1 mmol/L Chloride 108 97 - 108 mmol/L  
 CO2 30 21 - 32 mmol/L Anion gap 2 (L) 5 - 15 mmol/L Glucose 278 (H) 65 - 100 mg/dL BUN 28 (H) 6 - 20 MG/DL Creatinine 1.15 (H) 0.55 - 1.02 MG/DL  
 BUN/Creatinine ratio 24 (H) 12 - 20 GFR est AA 56 (L) >60 ml/min/1.73m2 GFR est non-AA 46 (L) >60 ml/min/1.73m2 Calcium 8.0 (L) 8.5 - 10.1 MG/DL Bilirubin, total 0.8 0.2 - 1.0 MG/DL  
 ALT (SGPT) 28 12 - 78 U/L  
 AST (SGOT) 18 15 - 37 U/L Alk. phosphatase 219 (H) 45 - 117 U/L Protein, total 5.1 (L) 6.4 - 8.2 g/dL Albumin 2.0 (L) 3.5 - 5.0 g/dL Globulin 3.1 2.0 - 4.0 g/dL A-G Ratio 0.6 (L) 1.1 - 2.2 Medications reviewed Current Facility-Administered Medications Medication Dose Route Frequency  pantoprazole (PROTONIX) tablet 40 mg  40 mg Oral ACB&D  
 0.9% sodium chloride infusion 250 mL  250 mL IntraVENous PRN  
 arformoteroL (BROVANA) neb solution 15 mcg  15 mcg Nebulization BID RT  
 budesonide (PULMICORT) 500 mcg/2 ml nebulizer suspension  500 mcg Nebulization BID RT  
 hydrocortisone Sod Succ (PF) (SOLU-CORTEF) injection 100 mg  100 mg IntraVENous Q8H  
 ipratropium (ATROVENT) 0.02 % nebulizer solution 0.5 mg  0.5 mg Nebulization Q6H RT  
  dilTIAZem (CARDIZEM) 100 mg in 0.9% sodium chloride (MBP/ADV) 100 mL infusion  0-15 mg/hr IntraVENous TITRATE  metoprolol tartrate (LOPRESSOR) tablet 12.5 mg  12.5 mg Oral Q6H  
 digoxin (LANOXIN) tablet 0.125 mg  0.125 mg Oral DAILY  influenza vaccine 2020-21 (6 mos+)(PF) (FLUARIX/FLULAVAL/FLUZONE QUAD) injection 0.5 mL  0.5 mL IntraMUSCular PRIOR TO DISCHARGE  metoprolol tartrate (LOPRESSOR) tablet 25 mg  25 mg Oral DAILY PRN  
 mirtazapine (REMERON) tablet 15 mg  15 mg Oral QHS  DULoxetine (CYMBALTA) capsule 60 mg  60 mg Oral DAILY  montelukast (SINGULAIR) tablet 10 mg  10 mg Oral DAILY  HYDROcodone-acetaminophen (NORCO) 5-325 mg per tablet 1 Tab  1 Tab Oral Q4H PRN  
 [Held by provider] furosemide (LASIX) tablet 20 mg  20 mg Oral DAILY  morphine injection 1 mg  1 mg IntraVENous Q6H PRN  
 0.9% sodium chloride infusion 250 mL  250 mL IntraVENous PRN  
 sodium chloride (NS) flush 5-40 mL  5-40 mL IntraVENous Q8H  
 sodium chloride (NS) flush 5-40 mL  5-40 mL IntraVENous PRN  
 acetaminophen (TYLENOL) tablet 650 mg  650 mg Oral Q6H PRN Or  
 acetaminophen (TYLENOL) suppository 650 mg  650 mg Rectal Q6H PRN  polyethylene glycol (MIRALAX) packet 17 g  17 g Oral DAILY PRN  promethazine (PHENERGAN) tablet 12.5 mg  12.5 mg Oral Q6H PRN Or  
 ondansetron (ZOFRAN) injection 4 mg  4 mg IntraVENous Q6H PRN  
 glucose chewable tablet 16 g  4 Tab Oral PRN  
 dextrose (D50W) injection syrg 12.5-25 g  25-50 mL IntraVENous PRN  
 glucagon (GLUCAGEN) injection 1 mg  1 mg IntraMUSCular PRN  
 insulin lispro (HUMALOG) injection   SubCUTAneous AC&HS  
 0.9% sodium chloride infusion 250 mL  250 mL IntraVENous PRN Care Plan discussed with: Patient and Nurse Total time spent with patient and review of records: 30 minutes.  
Esperanza Hurtado MD

## 2020-10-05 NOTE — PROGRESS NOTES
Spiritual Care Assessment/Progress Note [address] 
 
 
NAME: Andria Pierson      MRN: [id number] AGE: 69 y.o. SEX: female Pentecostalism Affiliation: Yazdanism Language: English  
 
10/5/2020     Total Time (in minutes): 5 Spiritual Assessment begun in [facility] 3 INTENSIVE CARE through conversation with: 
  
    []Patient        [] Family    [] Friend(s) Reason for Consult: Initial/Spiritual assessment, critical care Spiritual beliefs: (Please include comment if needed) 
   [] Identifies with a leanna tradition:     
   [] Supported by a leanna community:        
   [] Claims no spiritual orientation:       
   [] Seeking spiritual identity:            
   [] Adheres to an individual form of spirituality:       
   [x] Not able to assess:                   
 
    
Identified resources for coping:  
   [] Prayer                           
   [] Music                  [] Guided Imagery 
   [] Family/friends                 [] Pet visits [] Devotional reading                         [x] Unknown 
   [] Other:                                         
 
 
Interventions offered during this visit: (See comments for more details) Patient Interventions: Initial/Spiritual assessment, Critical care Plan of Care: 
 
 [] Support spiritual and/or cultural needs  
 [] Support AMD and/or advance care planning process    
 [] Support grieving process 
 [] Coordinate Rites and/or Rituals  
 [] Coordination with community clergy [] No spiritual needs identified at this time 
 [] Detailed Plan of Care below (See Comments)  [] Make referral to Music Therapy 
[] Make referral to Pet Therapy    
[] Make referral to Addiction services 
[] Make referral to [facility] 
[] Make referral to Spiritual Care Partner 
[] No future visits requested       
[x] Follow up visits as needed Comments:   visited pt, Miss Una Mcmullenr at the ICU for critical care initial spiritual assessment. Pt was being transferred to the MED Surg during the encounter.  consulted with staff and inquired from pt if she would like a  to visit. She declined, stating that she was doing well. Spiritual care is still available as needed or as able. Visited by: Shan Huerta To dejan : [phone])

## 2020-10-05 NOTE — PROGRESS NOTES
Bedside and Verbal shift change report given to [address] Avel (oncoming nurse) by Leah Brandt (offgoing nurse). Report included the following information SBAR, Kardex, Intake/Output, MAR, Recent Results and Cardiac Rhythm NSR.

## 2020-10-05 NOTE — PROGRESS NOTES
0700- Bedside and Verbal shift change report given to [address] [facility] and Cynthia Marroquin RN (oncoming nurse) by Jesica Garza RN and Rusty Oakes RN (offgoing nurse). Report included the following information SBAR, Kardex, ED Summary, Procedure Summary, Intake/Output, MAR, Accordion, Recent Results, Cardiac Rhythm NSR and Alarm Parameters . 0800- Shift assessment performed. AOX4, no s/s distress. PERRLA. On CPAP currently. Resting in bed. S1S2; no murmur heard. NSR on monitor. VSS. Lung sounds diminished. BS active x 4/ Purewick in place; no output since 0400. Foam dressing on BLE. Wound care every other day. 20 G R AC, flushed and saline locked. Contact precautions for EBSL in urine and MRSA in wounds. Call bell in reach. RT at bedside to assess and medicate. Medication administered. 1030 - TRANSFER - OUT REPORT: 
 
Verbal report given to (name) on Jerardo Juarez  being transferred to 5th floor (unit) for routine progression of care Report consisted of patients Situation, Background, Assessment and  
Recommendations(SBAR). Information from the following report(s) SBAR, Kardex, ED Summary, Procedure Summary, Intake/Output, MAR, Accordion, Recent Results, Cardiac Rhythm NSR and Alarm Parameters  was reviewed with the receiving nurse. Lines:  
Peripheral IV 10/04/20 Right Antecubital (Active) Site Assessment Clean, dry, & intact 10/05/20 1041 Phlebitis Assessment 0 10/05/20 1041 Infiltration Assessment 0 10/05/20 1041 Dressing Status Clean, dry, & intact 10/05/20 1041 Dressing Type Transparent 10/05/20 1041 Hub Color/Line Status Pink 10/05/20 1041 Action Taken Open ports on tubing capped 10/05/20 1041 Alcohol Cap Used Yes 10/05/20 1041 Opportunity for questions and clarification was provided. Patient transported with: 
 Monitor O2 @ 3 liters Tech

## 2020-10-05 NOTE — PROGRESS NOTES
Problem: Mobility Impaired (Adult and Pediatric) Goal: *Acute Goals and Plan of Care (Insert Text) Description: FUNCTIONAL STATUS PRIOR TO ADMISSION: Patient was modified independent using a rollator for functional mobility. Patient reports short distances only and requires lots of seated breaks on rollator. HOME SUPPORT PRIOR TO ADMISSION: The patient lived alone. Daughter is nearby. Reports she calls for security at [facility] when she needs help. Per pt report her and her daughter are looking into hiring private care aides for when she discharges. Physical Therapy Goals Initiated 10/2/2020 1. Patient will move from supine to sit and sit to supine , scoot up and down, and roll side to side in bed with moderate assistance  within 7 day(s). 2.  Patient will transfer from bed to chair and chair to bed with moderate assistance  using the least restrictive device within 7 day(s). 3.  Patient will perform sit to stand with moderate assistance  within 7 day(s). 4.  Patient will ambulate with moderate assistance  for 15 feet with the least restrictive device within 7 day(s). Outcome: Progressing Towards Goal 
 PHYSICAL THERAPY TREATMENT Patient: Koreen Cushing (26 y.o. female) Date: 10/5/2020 Diagnosis: Syncope and collapse [R55] <principal problem not specified> Procedure(s) (LRB): ESOPHAGOGASTRODUODENOSCOPY (EGD) (N/A) COLONOSCOPY (N/A) Precautions: Fall, Back, Skin Chart, physical therapy assessment, plan of care and goals were reviewed. ASSESSMENT Patient continues with skilled PT services and is progressing towards goals. Communicated with nurse cleared for therapy. Rolled on the edge of bed max assist x 2, supine to sit max assist x 2, dangled on the edge of bed max assist x 2. Patient complaining pain 10/10 on her back refuse to try to stand or scoot up high on the bed. Assisted supine on bed and reposition patient up high on the bed place bed to chair position.  Educate and  Instructed patient to continue active range of motion exercise on both legs while up on bed. Nurse was in the room after therapy and agreed to monitor patient. Current Level of Function Impacting Discharge (mobility/balance): max assist x 2 with supine to sit Other factors to consider for discharge: pain. PLAN : 
Patient continues to benefit from skilled intervention to address the above impairments. Continue treatment per established plan of care. to address goals. Recommendation for discharge: (in order for the patient to meet his/her long term goals) Therapy up to 5 days/week in SNF setting This discharge recommendation: 
Has been made in collaboration with the attending provider and/or case management IF patient discharges home will need the following DME: patient owns DME required for discharge SUBJECTIVE:  
Patient stated my back hurts a lot.  OBJECTIVE DATA SUMMARY:  
Critical Behavior: 
Neurologic State: Alert Orientation Level: Oriented X4 Cognition: Appropriate decision making Functional Mobility Training: 
Bed Mobility: 
Rolling: Maximum assistance;Assist x2 Supine to Sit: Maximum assistance;Assist x2 Sit to Supine: Maximum assistance;Assist x2 Scooting: Maximum assistance;Assist x2 Transfers: 
Sit to Stand: (declined to sit to stand too painful on her back) Balance: 
Sitting: Impaired;High guard Sitting - Static: Poor (constant support) Sitting - Dynamic: Poor (constant support) Ambulation/Gait Training: N/t Therapeutic Exercises:  
 Instructed patient to continue active range of motion exercise on both legs while up on chair or on bed. Pain Rating: 
10/10 Activity Tolerance:  
Good After treatment patient left in no apparent distress:  
Supine in bed, Heels elevated for pressure relief, Call bell within reach, Bed / chair alarm activated, and Side rails x 3 
 
COMMUNICATION/COLLABORATION:  
 The patients plan of care was discussed with: Occupational therapist and Registered nurse. Deepali Solomon, PT Time Calculation: 28 mins

## 2020-10-06 NOTE — PROGRESS NOTES
Problem: Self Care Deficits Care Plan (Adult) Goal: *Acute Goals and Plan of Care (Insert Text) Description:  
FUNCTIONAL STATUS PRIOR TO ADMISSION: Patient was modified independent using a rollator for functional mobility. Patient reports short distances only and requires lots of seated breaks on rollator. She states she manages her care independently but admits to difficulty with LB tasks (putting on socks and bathing LB). HOME SUPPORT PRIOR TO ADMISSION: The patient lived alone. Daughter is nearby. Reports she calls for security at [facility] when she needs help. Per pt report her and her daughter are looking into hiring private care aides for when she discharges. Occupational Therapy Goals Initiated 10/5/2020 1. Patient will perform grooming with modified independence from unsupported seated position within 7 day(s). 2.  Patient will perform upper body dressing and bathing with modified independence within 7 day(s). 3.  Patient will perform lower body dressing and bathing with moderate assistance using AE PRN within 7 day(s). 4.  Patient will perform toilet transfers to [facility] with moderate assistance  within 7 day(s). 5.  Patient will perform all aspects of toileting with moderate assistance  within 7 day(s). 6.  Patient will participate in upper extremity therapeutic exercise/activities with supervision/set-up for 10 minutes within 7 day(s). 7.  Patient will utilize energy conservation techniques during functional activities with verbal cues within 7 day(s). Outcome: Progressing Towards Goal 
 OCCUPATIONAL THERAPY TREATMENT Patient: Teofilo Bergeron (65 y.o. female) Date: 10/6/2020 Diagnosis: Syncope and collapse [R55] <principal problem not specified> Procedure(s) (LRB): ESOPHAGOGASTRODUODENOSCOPY (EGD) (N/A) COLONOSCOPY (N/A) Precautions: Fall, Back, Skin Chart, occupational therapy assessment, plan of care, and goals were reviewed. ASSESSMENT Patient continues with skilled OT services and is progressing towards goals. Patient received supine in bed with HOB elevated. Patient reporting no plan for SNF at discharge as she prefers to go home with caregiver support. Patient agreeable to activity. She moves to EOB with max assist x 2, heavy use of bed rail. Patient sitting at EOB, and requires increased time for SOB prior to further mobility and using bilateral UEs for support to reduce pain to back. She is unable to complete initial stand, but with continued rest , she performs sit to stand with max assist x 2. Patient stands only briefly, crying out in pain and returns to sitting. Patient then to supine with max assist x 2. Total assist for scooting to head of bed. Current Level of Function Impacting Discharge (ADLs): max x 2 for bed mobility, only able to stand briefly and requires bilateral UE support for sitting Other factors to consider for discharge: lives with daughter PLAN : 
Patient continues to benefit from skilled intervention to address the above impairments. Continue treatment per established plan of care. to address goals. Recommend with staff: UE ADLs as able Recommend next OT session: progression of goals Recommendation for discharge: (in order for the patient to meet his/her long term goals) Therapy up to 5 days/week in SNF setting This discharge recommendation: 
Has been made in collaboration with the attending provider and/or case management IF patient discharges home will need the following DME: TBD SUBJECTIVE:  
Patient stated I dont want to go because they are going to make me stand up, and I can't. And when they get you there, they don't let you leave.  OBJECTIVE DATA SUMMARY:  
Cognitive/Behavioral Status: 
Neurologic State: Alert Orientation Level: Oriented X4 Cognition: Appropriate decision making; Follows commands Perception: Appears intact Perseveration: No perseveration noted Safety/Judgement: Awareness of environment Functional Mobility and Transfers for ADLs: 
Bed Mobility: 
Rolling: Moderate assistance;Assist x2 Supine to Sit: Maximum assistance;Assist x2 Sit to Supine: Moderate assistance;Assist x2 Scooting: Total assistance(unable to scoot in sitting) Transfers: 
Sit to Stand: Maximum assistance;Assist x2; Additional time(only able to stand briefly, cries out in pain) Balance: 
Sitting: Impaired Sitting - Static: Prop sitting Standing: Impaired; With support Standing - Static: Constant support Standing - Dynamic : (not tested ) ADL Intervention: Lower Body Dressing Assistance Socks: Total assistance (dependent) Cognitive Retraining Safety/Judgement: Awareness of environment Therapeutic Exercises:  
 
Pain: 
Patient reports 8/10 pain at end of activity Activity Tolerance:  
Poor, requires rest breaks, observed SOB with activity, and patient on 3L O2 Please refer to the flowsheet for vital signs taken during this treatment. After treatment patient left in no apparent distress:  
Supine in bed, Call bell within reach, and Side rails x 3 
 
COMMUNICATION/COLLABORATION:  
The patients plan of care was discussed with: Physical therapy assistant and Registered nurse. RAMYA Zamora/ALEJANDRO Time Calculation: 24 mins

## 2020-10-06 NOTE — PROGRESS NOTES
Bedside and Verbal shift change report given to [facility], RN (oncoming nurse) by Moisés Aguilar RN (offgoing nurse). Report included the following information SBAR, Kardex and MAR.

## 2020-10-06 NOTE — PROGRESS NOTES
2:34 PM 
10/06/20 CM assessment completed via EMR review and collaboration with nursing staff. No contact with patient for this assessment. Unable to meet with patient at this time, echocardiogram in progress. 
  
Care Management follow up 
  
Patient admitted for syncope, collapse, fall. Hx HTN, afib, CAD with stents, NSTEMI, C7 compression fracture, GERD, sleep apnea- wears cpap, temporal arteritis. 
  
RUR score 29%  moderate risk 
  
Current status Patient currently requiring medical management, evaluation of symptoms. patient is refusing EGD/colonoscopy per GI . Daughter Ghislaine Rhodes updated on plan  That physicians have put in referral for SNF/Rehab . Daughter concerned about her mother fractured lumbar, nursing made aware, they will alert Orthopedic.  
  
Transition of Care Plan 1. Monitor patient status and response to treatment. 2. Medical management and evaluation continues. 3. Referral sent to [address] [place] [address] for SNF/Rehab awaiting response if will accept. Patient is a resident at [address] [place] [address]. 4.  Accepted at [address] . . Will completed CM assessment when patient available.  
 
 
Khadra Apple RN CCM

## 2020-10-06 NOTE — PROGRESS NOTES
Daily Progress Note: 10/6/2020 Awa Bolds PCP: 
Shruti Meyer MD 
 
Assessment/Plan:  
Acute Syncopal Episode - likely due to low BP from anemia 
- monitor/tx as needed Hypotension - resolved 
- off pressors 10/2 CAD- stable CHF 
- Diuretics as needed Chronic A-fib with acute exacerbations of RVR 
- Holding Eliquis - Cards consulted - Metoprolol BID Acute on chronic anemia, with hemorrhagic shock 10/1/20 
- Transfuse as needed;  
- GI consult - colonoscopy planned at some point - currently on hold - PPI Dehydration 
- IVF 
 
DM2 
- SSI, Monitor BG ac and hs 
 
HAYES - CPAP Depression/Anxiety - Remeron and Cymbata Nondisplaced acute fracture L3 superior endplate. 
- poss kyphoplasty when more stable - Ortho has seen Problem List: 
Problem List as of 10/6/2020 Date Reviewed: 5/29/2020 Codes Class Noted - Resolved Syncope and collapse ICD-10-CM: R55 
ICD-9-CM: 780.2  9/29/2020 - Present Cellulitis of lower extremity ICD-10-CM: L03.119 ICD-9-CM: 682.6  3/23/2020 - Present ASO (arteriosclerosis obliterans) ICD-10-CM: I70.90 ICD-9-CM: 440.9  9/5/2019 - Present PVD (peripheral vascular disease) ([facility] ICD-10-CM: I73.9 ICD-9-CM: 443.9  8/1/2019 - Present Severe obesity ([facility] ICD-10-CM: E66.01 
ICD-9-CM: 278.01  7/30/2019 - Present UTI (urinary tract infection) ICD-10-CM: N39.0 ICD-9-CM: 599.0  7/13/2019 - Present COPD (chronic obstructive pulmonary disease) ([facility]) ICD-10-CM: J44.9 ICD-9-CM: 962  7/13/2019 - Present Sepsis ([facility] ICD-10-CM: A41.9 ICD-9-CM: 038.9, 995.91  7/13/2019 - Present Normocytic anemia ICD-10-CM: D64.9 ICD-9-CM: 285.9  7/13/2019 - Present PAD (peripheral artery disease) (HCC) ICD-10-CM: I73.9 ICD-9-CM: 443.9  7/13/2019 - Present Mild intermittent asthma ICD-10-CM: J45.20 ICD-9-CM: 493.90  5/17/2019 - Present  Gangrene of finger of right hand ([facility] ICD-10-CM: H78 
 ICD-9-CM: 785.4  5/17/2019 - Present Brachial artery thrombus (HCC) ICD-10-CM: O37.4 ICD-9-CM: 444.21  4/26/2019 - Present Sleep apnea ICD-10-CM: G47.30 ICD-9-CM: 780.57  1/5/2019 - Present Overview Signed 1/5/2019  8:41 PM by Phyllis Devine DO  
  wears CPAP Atrial fibrillation [facility]) ICD-10-CM: I48.91 
ICD-9-CM: 427.31  11/16/2018 - Present Obesity (BMI 30-39.9) (Chronic) ICD-10-CM: G70.8 ICD-9-CM: 278.00  11/13/2018 - Present Type 2 diabetes with nephropathy ([facility] ICD-10-CM: E11.21 
ICD-9-CM: 250.40, 583.81  10/1/2018 - Present Recurrent deep vein thrombosis (DVT) (HCC) ICD-10-CM: I82.409 ICD-9-CM: 453.40  3/30/2018 - Present Chronic anticoagulation (Chronic) ICD-10-CM: Z79.01 
ICD-9-CM: V58.61  3/30/2018 - Present Coronary artery disease involving native coronary artery without angina pectoris (Chronic) ICD-10-CM: I25.10 ICD-9-CM: 414.01  1/22/2016 - Present Essential hypertension (Chronic) ICD-10-CM: I10 
ICD-9-CM: 401.9  1/22/2016 - Present CAD (coronary artery disease) ICD-10-CM: I25.10 ICD-9-CM: 414.00  10/9/2015 - Present Type II diabetes mellitus (HCC) (Chronic) ICD-10-CM: E11.9 ICD-9-CM: 250.00  10/9/2015 - Present RESOLVED: Cellulitis ICD-10-CM: L03.90 ICD-9-CM: 682.9  3/23/2020 - 5/29/2020 RESOLVED: Hypokalemia ICD-10-CM: E87.6 ICD-9-CM: 276.8  3/23/2020 - 5/29/2020 RESOLVED: Hyponatremia ICD-10-CM: E87.1 ICD-9-CM: 276.1  3/23/2020 - 5/29/2020 RESOLVED: Diarrhea ICD-10-CM: R19.7 ICD-9-CM: 787.91  3/23/2020 - 5/29/2020 RESOLVED: Syncope and collapse ICD-10-CM: R55 
ICD-9-CM: 780.2  7/13/2019 - 5/29/2020 RESOLVED: Leg wound, left ICD-10-CM: Q84.585T ICD-9-CM: 891.0  7/13/2019 - 5/29/2020 RESOLVED: Leg laceration, right, initial encounter ICD-10-CM: V31.095L ICD-9-CM: 894.0  7/13/2019 - 5/29/2020 RESOLVED: Cellulitis of right upper arm ICD-10-CM: L03.113 ICD-9-CM: 682.3  5/17/2019 - 5/29/2020 RESOLVED: Bowel obstruction ([facility] ICD-10-CM: Y47.839 ICD-9-CM: 560.9  1/8/2019 - 5/29/2020 RESOLVED: Partial small bowel obstruction ([facility] ICD-10-CM: K56.600 ICD-9-CM: 560.9  1/5/2019 - 5/29/2020 RESOLVED: Dyspnea ICD-10-CM: R06.00 
ICD-9-CM: 786.09  11/13/2018 - 5/29/2020 RESOLVED: ARF (acute renal failure) (HCC) ICD-10-CM: N17.9 ICD-9-CM: 584.9  11/13/2018 - 5/29/2020 RESOLVED: Closed wedge compression fracture of T7 vertebra ([facility] ICD-10-CM: L95.702T ICD-9-CM: 805.2  11/13/2018 - 5/29/2020 RESOLVED: Chest pain ICD-10-CM: R07.9 ICD-9-CM: 786.50  6/27/2018 - 5/29/2020 RESOLVED: Abdominal pain ICD-10-CM: R10.9 ICD-9-CM: 789.00  6/27/2018 - 5/29/2020 RESOLVED: HTN (hypertension) ICD-10-CM: I10 
ICD-9-CM: 401.9  10/9/2015 - 1/22/2016 RESOLVED: NSTEMI (non-ST elevated myocardial infarction) ([facility] ICD-10-CM: I21.4 ICD-9-CM: 410.70  10/9/2015 - 5/29/2020 HPI:  
 27-year-old female with multiple medical problems including obesity, sleep apnea, non-ST-elevation myocardial infarction, primary hypertension, congestive heart failure, gastroesophageal reflux disease, type 2 diabetes mellitus, temporal arteritis, fibromyalgia, atrial fibrillation and asthma, was brought to the emergency room from home for complaints of lightheadedness, dizziness with subsequent fall and low back pain. Patient stated that whilst preparing some dinner in the evening hours of 09/29/2020, had the onset of symptoms with subsequent fall in a sitting position. Thereafter, patient noted persistent low back pain. Patient denied any recent changes in medication. Denied headaches, visual deficits, chest pain, palpitations, sore throat, cough, shortness of breath, nausea, vomiting, fevers, chills, abdominal pain, bladder or bowel irregularities. (Dr Victoria Borden) 9/30: She is c/o pain in her back. Meds are helping. Hb 5.1 and she has been transfused with 2 units. AM labs pending and she will need a line as she is a difficult stick. May need more blood. She reports her stools have been dark so will ask GI to see. Holding Eliquis. She is on chronic steroids due to TA.  
 
10/1:  Heart rate up to 180s this AM - seems to have spiked up due to back pain but now back in 110s. No other complaint except back pain. No chest pain or SOB. Hb 7.1 this AM - watching. GI planning colonoscopy - starting prep. Poss kyphoplasty when more stable. 10/2:  Reports she feels \"just bad all over\" this AM.  Last night became hypotensive, dropped Hb again, required another transfusion of 1 unit, and Roland had to be started to maintain BP - remains on Roland this AM.  Also mult episodes of RVR of her A fib - Cards treating and Dilt drip started. Transferred to ICU. Hb up to 8.8 this AM.  Tmax 99. WBC up a little from previous -watching. 10/3:  Feeling much better this AM and sitting up eating breakfast.  Back in sinus rhythm with rate in 80s at this time. BP better. Off pressors. Off IV dilt today and on BB. Hb 8.0. Hopefully out of ICU later today or tomorrow. 10/4:  Continues to feel better. BP ok. Out of ICU today. 10/5:  Little change from yesterday. No new complaints. Still c/o back pain with movement. She may be stable enough for the GI work up and for Kyphoplasty - per GI and IR. Hb 8.0. 
 
10/6:  AM labs pending. Still c/o pain. Holding anticoag. More fatigued. Review of Systems: A comprehensive review of systems was negative except for that written in the HPI. Objective:  
Physical Exam:  
Visit Vitals BP (!) 164/90 (BP 1 Location: Left arm, BP Patient Position: At rest) Pulse 87 Temp 98.1 °F (36.7 °C) Resp 16 Ht 5' 7\" (1.702 m) Wt 114.4 kg (252 lb 3.3 oz) SpO2 97% BMI 39.50 kg/m² O2 Flow Rate (L/min): 3 l/min O2 Device: Nasal cannula Temp (24hrs), Av.8 °F (36.6 °C), Min:97.5 °F (36.4 °C), Max:98.1 °F (36.7 °C) No intake/output data recorded. 10/04 0701 - 10/05 1900 In: 100 [P.O.:100] Out: 775 [Urine:775] General:  Alert, cooperative, no distress, obese Head:  Normocephalic, without obvious abnormality, atraumatic. Eyes:  Conjunctivae/corneas clear. PERRL, EOMs intact. Throat: Lips, mucosa, and tongue normal. Teeth and gums normal.  
Neck: Supple, symmetrical, trachea midline, no adenopathy, thyroid: no enlargement/tenderness/nodules, no carotid bruit and no JVD. Back:   Symmetric, no curvature. ROM normal. No CVA tenderness. Lungs:   Clear to auscultation bilaterally. Chest wall:  No tenderness or deformity. Heart:  Currently in sinus rhythm with rate in 95I, no murmur, click, rub or gallop. Abdomen:   Soft, non-tender. Bowel sounds normal. No masses,  No organomegaly. Extremities: Extremities with weeping and swelling, mildly erythematous, dressed. No calf tenderness or cords. Pulses: 1+ and symmetric all extremities. Skin: Skin color, texture, turgor normal.   
Neurologic:   Alert and oriented X 3. Fine motor of hands and fingers normal.   equal.  No cogwheeling or rigidity. Gait not tested at this time. Sensation grossly normal to touch. Gross motor of extremities normal.    
 
Data Review:  
CT Head IMPRESSION: No acute findings. EXAM:  CT CERVICAL SPINE WITHOUT CONTRAST INDICATION: fall w head injury. COMPARISON: None. CONTRAST:  None. FINDINGS: 
Alignment is satisfactory, no acute fracture or dislocation. Moderate spondylosis and disc degeneration at C5-6 IMPRESSION 
 impression: No acute changes. CT LS spine Axial CT scan of the lumbar sacral spine obtained without contrast with coronal 
and sagittal reconstructions. No prior. CT dose reduction was achieved through 
the use of a standardized protocol tailored for this examination and automatic exposure control for dose modulation . Rosa Daigle There is a minimally displaced fracture of the superior endplate of L3 without 
significant canal compromise. There is some mild diffuse spondylosis and disc degeneration. IMPRESSION 
 impression: Nondisplaced acute fracture L3 superior endplate. Recent Days: 
Recent Labs 10/05/20 
0417 10/04/20 
0009 WBC 10.2 15.2* HGB 8.0* 7.9*  
HCT 29.3* 27.4*  
 246 Recent Labs 10/05/20 
0417 10/04/20 
0009  138  
K 4.4 4.4  105 CO2 30 32 * 253* BUN 28* 20  
CREA 1.15* 1.28* CA 8.0* 7.7* ALB 2.0* 1.8* TBILI 0.8 1.1* ALT 28 31  
24 Hour Results: 
Recent Results (from the past 24 hour(s)) GLUCOSE, POC Collection Time: 10/05/20  7:33 AM  
Result Value Ref Range Glucose (POC) 294 (H) 65 - 100 mg/dL Performed by Marissa Herr GLUCOSE, POC Collection Time: 10/05/20 11:14 AM  
Result Value Ref Range Glucose (POC) 206 (H) 65 - 100 mg/dL Performed by Chloé Momo GLUCOSE, POC Collection Time: 10/05/20  5:08 PM  
Result Value Ref Range Glucose (POC) 243 (H) 65 - 100 mg/dL Performed by Guy Seip (PCT) GLUCOSE, POC Collection Time: 10/05/20  9:34 PM  
Result Value Ref Range Glucose (POC) 276 (H) 65 - 100 mg/dL Performed by Gayle Breen ( PCT) Medications reviewed Current Facility-Administered Medications Medication Dose Route Frequency  metoprolol tartrate (LOPRESSOR) tablet 25 mg  25 mg Oral Q12H  hydrocortisone Sod Succ (PF) (SOLU-CORTEF) injection 50 mg  50 mg IntraVENous Q8H  
 pantoprazole (PROTONIX) tablet 40 mg  40 mg Oral ACB&D  
 0.9% sodium chloride infusion 250 mL  250 mL IntraVENous PRN  
 arformoteroL (BROVANA) neb solution 15 mcg  15 mcg Nebulization BID RT  
 budesonide (PULMICORT) 500 mcg/2 ml nebulizer suspension  500 mcg Nebulization BID RT  
 ipratropium (ATROVENT) 0.02 % nebulizer solution 0.5 mg  0.5 mg Nebulization Q6H RT  
  digoxin (LANOXIN) tablet 0.125 mg  0.125 mg Oral DAILY  influenza vaccine 2020-21 (6 mos+)(PF) (FLUARIX/FLULAVAL/FLUZONE QUAD) injection 0.5 mL  0.5 mL IntraMUSCular PRIOR TO DISCHARGE  metoprolol tartrate (LOPRESSOR) tablet 25 mg  25 mg Oral DAILY PRN  
 mirtazapine (REMERON) tablet 15 mg  15 mg Oral QHS  DULoxetine (CYMBALTA) capsule 60 mg  60 mg Oral DAILY  montelukast (SINGULAIR) tablet 10 mg  10 mg Oral DAILY  HYDROcodone-acetaminophen (NORCO) 5-325 mg per tablet 1 Tab  1 Tab Oral Q4H PRN  
 [Held by provider] furosemide (LASIX) tablet 20 mg  20 mg Oral DAILY  morphine injection 1 mg  1 mg IntraVENous Q6H PRN  
 0.9% sodium chloride infusion 250 mL  250 mL IntraVENous PRN  
 sodium chloride (NS) flush 5-40 mL  5-40 mL IntraVENous Q8H  
 sodium chloride (NS) flush 5-40 mL  5-40 mL IntraVENous PRN  
 acetaminophen (TYLENOL) tablet 650 mg  650 mg Oral Q6H PRN Or  
 acetaminophen (TYLENOL) suppository 650 mg  650 mg Rectal Q6H PRN  polyethylene glycol (MIRALAX) packet 17 g  17 g Oral DAILY PRN  promethazine (PHENERGAN) tablet 12.5 mg  12.5 mg Oral Q6H PRN Or  
 ondansetron (ZOFRAN) injection 4 mg  4 mg IntraVENous Q6H PRN  
 glucose chewable tablet 16 g  4 Tab Oral PRN  
 dextrose (D50W) injection syrg 12.5-25 g  25-50 mL IntraVENous PRN  
 glucagon (GLUCAGEN) injection 1 mg  1 mg IntraMUSCular PRN  
 insulin lispro (HUMALOG) injection   SubCUTAneous AC&HS  
 0.9% sodium chloride infusion 250 mL  250 mL IntraVENous PRN Care Plan discussed with: Patient and Nurse Total time spent with patient and review of records: 30 minutes.  
Paula Estrada MD

## 2020-10-06 NOTE — PROGRESS NOTES
5:39 PM 
10/06/20 Spoke with Daughter Rojas Zhu and Son-in-Law Jade Grant  That [facility] patient to SNF Rehab . They will speak to patient about  Going to SNF. 2 COVID are needed 24 hours apart and a CXR. Nursing aware . Abel Ko RN CCM

## 2020-10-06 NOTE — PROGRESS NOTES
Problem: Mobility Impaired (Adult and Pediatric) Goal: *Acute Goals and Plan of Care (Insert Text) Description: FUNCTIONAL STATUS PRIOR TO ADMISSION: Patient was modified independent using a rollator for functional mobility. Patient reports short distances only and requires lots of seated breaks on rollator. HOME SUPPORT PRIOR TO ADMISSION: The patient lived alone. Daughter is nearby. Reports she calls for security at [facility] when she needs help. Per pt report her and her daughter are looking into hiring private care aides for when she discharges. Physical Therapy Goals Initiated 10/2/2020 1. Patient will move from supine to sit and sit to supine , scoot up and down, and roll side to side in bed with moderate assistance  within 7 day(s). 2.  Patient will transfer from bed to chair and chair to bed with moderate assistance  using the least restrictive device within 7 day(s). 3.  Patient will perform sit to stand with moderate assistance  within 7 day(s). 4.  Patient will ambulate with moderate assistance  for 15 feet with the least restrictive device within 7 day(s). Note: PHYSICAL THERAPY TREATMENT Patient: Atul Sandhu (39 y.o. female) Date: 10/6/2020 Diagnosis: Syncope and collapse [R55] <principal problem not specified> Procedure(s) (LRB): ESOPHAGOGASTRODUODENOSCOPY (EGD) (N/A) COLONOSCOPY (N/A) Precautions: Fall, Back, Skin Chart, physical therapy assessment, plan of care and goals were reviewed. ASSESSMENT Patient continues with skilled PT services. Pt supine to sit with mod to max of 2. Pt unable to come to stand from bed after 2 attempts. Pts bed was elevated. Pt then came to stand from bed min to mod of 2. Pt struggled to stand with RW min assist of 2. Pt with increased back pain crying out. Pt requesting return to supine needing max of 2. Pt progress slow. Continue goals.  
 
Current Level of Function Impacting Discharge (mobility/balance): Pt mod to max of 2 for bed mobility. PLAN : 
Patient continues to benefit from skilled intervention to address the above impairments. Continue treatment per established plan of care. to address goals. Recommendation for discharge: (in order for the patient to meet his/her long term goals) Therapy up to 5 days/week in SNF setting This discharge recommendation: 
Has been made in collaboration with the attending provider and/or case management IF patient discharges home will need the following DME: rolling walker SUBJECTIVE:  
 
 
OBJECTIVE DATA SUMMARY:  
Critical Behavior: 
Neurologic State: Alert Orientation Level: Oriented X4 Cognition: Appropriate decision making, Follows commands Safety/Judgement: Awareness of environment Functional Mobility Training: 
Bed Mobility: 
Rolling: Moderate assistance;Assist x2 Supine to Sit: Moderate assistance;Maximum assistance;Assist x2 Sit to Supine: Maximum assistance;Assist x2 Scooting: Total assistance(unable to scoot in sitting) Transfers: 
Sit to Stand: Minimum assistance; Moderate assistance;Assist x2 Stand to Sit: Minimum assistance;Assist x2 Balance: 
Sitting: Impaired Sitting - Static: Fair (occasional) Standing: Impaired; With support Standing - Static: Poor Standing - Dynamic : (not tested ) Activity Tolerance:  
Fair to Poor Please refer to the flowsheet for vital signs taken during this treatment. After treatment patient left in no apparent distress:  
Supine in bed COMMUNICATION/COLLABORATION:  
The patients plan of care was discussed with: Physical therapist.  
 
Hima Sandhu PTA Time Calculation: 23 mins

## 2020-10-06 NOTE — PROGRESS NOTES
Gastrointestinal Progress Note 
 
10/6/2020 Admit Date: 9/29/2020 Subjective: No complaints. Discussed cardiology recommendations; discussed differential diagnosis iron deficiency anemia. She specifically declines endoscopic exams Current Facility-Administered Medications Medication Dose Route Frequency  hydrocortisone Sod Succ (PF) (SOLU-CORTEF) injection 25 mg  25 mg IntraVENous Q8H  
 metoprolol tartrate (LOPRESSOR) tablet 25 mg  25 mg Oral Q12H  pantoprazole (PROTONIX) tablet 40 mg  40 mg Oral ACB&D  
 0.9% sodium chloride infusion 250 mL  250 mL IntraVENous PRN  
 arformoteroL (BROVANA) neb solution 15 mcg  15 mcg Nebulization BID RT  
 budesonide (PULMICORT) 500 mcg/2 ml nebulizer suspension  500 mcg Nebulization BID RT  
 ipratropium (ATROVENT) 0.02 % nebulizer solution 0.5 mg  0.5 mg Nebulization Q6H RT  
 digoxin (LANOXIN) tablet 0.125 mg  0.125 mg Oral DAILY  influenza vaccine 2020-21 (6 mos+)(PF) (FLUARIX/FLULAVAL/FLUZONE QUAD) injection 0.5 mL  0.5 mL IntraMUSCular PRIOR TO DISCHARGE  metoprolol tartrate (LOPRESSOR) tablet 25 mg  25 mg Oral DAILY PRN  
 mirtazapine (REMERON) tablet 15 mg  15 mg Oral QHS  DULoxetine (CYMBALTA) capsule 60 mg  60 mg Oral DAILY  montelukast (SINGULAIR) tablet 10 mg  10 mg Oral DAILY  HYDROcodone-acetaminophen (NORCO) 5-325 mg per tablet 1 Tab  1 Tab Oral Q4H PRN  
 [Held by provider] furosemide (LASIX) tablet 20 mg  20 mg Oral DAILY  morphine injection 1 mg  1 mg IntraVENous Q6H PRN  
 0.9% sodium chloride infusion 250 mL  250 mL IntraVENous PRN  
 sodium chloride (NS) flush 5-40 mL  5-40 mL IntraVENous Q8H  
 sodium chloride (NS) flush 5-40 mL  5-40 mL IntraVENous PRN  
 acetaminophen (TYLENOL) tablet 650 mg  650 mg Oral Q6H PRN Or  
 acetaminophen (TYLENOL) suppository 650 mg  650 mg Rectal Q6H PRN  polyethylene glycol (MIRALAX) packet 17 g  17 g Oral DAILY PRN  
  promethazine (PHENERGAN) tablet 12.5 mg  12.5 mg Oral Q6H PRN Or  
 ondansetron (ZOFRAN) injection 4 mg  4 mg IntraVENous Q6H PRN  
 glucose chewable tablet 16 g  4 Tab Oral PRN  
 dextrose (D50W) injection syrg 12.5-25 g  25-50 mL IntraVENous PRN  
 glucagon (GLUCAGEN) injection 1 mg  1 mg IntraMUSCular PRN  
 insulin lispro (HUMALOG) injection   SubCUTAneous AC&HS  
 0.9% sodium chloride infusion 250 mL  250 mL IntraVENous PRN Objective:  
 
Blood pressure (!) 189/86, pulse 90, temperature 99.2 °F (37.3 °C), resp. rate 17, height 5' 7\" (1.702 m), weight 114.4 kg (252 lb 3.3 oz), SpO2 96 %. No intake/output data recorded. 10/04 1901 - 10/06 0700 In: -  
Out: 148 [[id number]] EXAM:  GENERAL: alert, no distress, HEART: regular rate and rhythm, LUNGS: chest clear, no wheezing, rales, normal symmetric air entry, ABDOMEN:  Bowel sounds are normal, liver is not enlarged, spleen is not enlarged and EXTREMITY: edema diffuse Data Review Recent Results (from the past 24 hour(s)) GLUCOSE, POC Collection Time: 10/05/20 11:14 AM  
Result Value Ref Range Glucose (POC) 206 (H) 65 - 100 mg/dL Performed by Raghavendra Salcedo GLUCOSE, POC Collection Time: 10/05/20  5:08 PM  
Result Value Ref Range Glucose (POC) 243 (H) 65 - 100 mg/dL Performed by Rajeev Redding (PCT) GLUCOSE, POC Collection Time: 10/05/20  9:34 PM  
Result Value Ref Range Glucose (POC) 276 (H) 65 - 100 mg/dL Performed by Cordell Bauer ( PCT) GLUCOSE, POC Collection Time: 10/06/20  6:50 AM  
Result Value Ref Range Glucose (POC) 206 (H) 65 - 100 mg/dL Performed by Marbella Mejia Assessment:  
 
Active Problems: 
  Syncope and collapse (9/29/2020) Plan: 1. Iron deficiency, B12 deficiency anemia; indeterminate sources. 2.  Iron, B12 supplements 3. Will see again as needed

## 2020-10-07 NOTE — PROGRESS NOTES
Bedside shift change report given to Javed Calles RN (oncoming nurse) by Nalini Bella RN (offgoing nurse). Report included the following information SBAR, Kardex and MAR.

## 2020-10-07 NOTE — PROGRESS NOTES
Comprehensive Nutrition Assessment Type and Reason for Visit: Esperanza Hampton Nutrition Recommendations/Plan: 1. Continue cardiac diet. 2. Continue leonides Glucerna BID to promote adequate intake. Nutrition Assessment:    
10/7: F/u. Pt sleeping soundly at time of visit. Breakfast tray in room, 50% pancakes eaten and 100% Glucerna consumed. Will continue to send BID. Recorded intakes good, 60-90% meals yesterday. Meals intakes mostly >/= 50%. Labs- -227-180, Ca 8. 0. Meds- B12, FeSu, insulin, Protonix, solu-cortef. 10/2: 68 y/o female admitted with syncope and collapse. PMh includes CAD, DM, COPD. BMI 39.9, c/w class II obesity. Pt reports poor appetite since admission. breakfast tray in room untouched- says she is waiting to sit up to eat. Recorded intakes 25-50% meals. Says she was eating well PTA, 2 meals/day + one snack. Denies any recent weight changes. Pt has received Glucerna during previous admissions and liked it. She is agreeable to receiving it while here- will add BID. Labs- Ca 7.4, B12 159, A1c 7.6, -127-128. Meds- Humalog, Remeron, Protonix, [email or web address]. Intakes: 
Patient Vitals for the past 168 hrs: 
 % Diet Eaten 10/06/20 1306 60 % 10/06/20 0902 90 % 10/04/20 1200 50 % 10/02/20 2000 50 % 09/30/20 1320 50 % 09/30/20 1010 25 % Weight hx: Wt Readings from Last 10 Encounters:  
10/06/20 115.8 kg (255 lb 4.7 oz) 07/31/20 115.6 kg (254 lb 12.8 oz) 05/30/20 111.7 kg (246 lb 3.2 oz)  
03/23/20 113.4 kg (250 lb) 01/31/20 120.9 kg (266 lb 9.6 oz) 10/31/19 112.9 kg (249 lb) 09/05/19 108.9 kg (240 lb) 08/30/19 109.8 kg (242 lb) 08/01/19 106.1 kg (234 lb)  
07/30/19 106.1 kg (234 lb) Malnutrition Assessment: 
Malnutrition Status: none Estimated Daily Nutrient Needs: 
Energy (kcal):  8002(7598 x 1.3 AF) Protein (g):  112(1-1.2 g/kg) Fluid (ml/day):  2208(1mL/kcal or per MD) Nutrition Related Findings:  LBM 10/5; 1+ pitting LE edema Wounds:   
None Current Nutrition Therapies: DIET CARDIAC Regular; 2 GM NA (House Low NA) DIET NUTRITIONAL SUPPLEMENTS Lunch, Breakfast; Glucerna Shake Anthropometric Measures: 
· Height:  5' 7\" (170.2 cm) · Current Body Wt:  115.7 kg (255 lb) · Ideal Body Wt:  135 lbs:  188.9 % · BMI Category:  Obese class 2 (BMI 35.0-39. 9) Nutrition Diagnosis:  
· Inadequate oral intake related to inadequate protein-energy intake as evidenced by intake 0-25%, intake 26-50% 10/7: Nutrition dx improving, intakes mostly >50%. Nutrition Interventions:  
Food and/or Nutrient Delivery: Continue current diet, Continue oral nutrition supplement Nutrition Education and Counseling: No recommendations at this time Coordination of Nutrition Care: Continued inpatient monitoring Goals: 
PO intake >50% meals + ONS next 5-7 days Nutrition Monitoring and Evaluation:  
Food/Nutrient Intake Outcomes: Food and nutrient intake, Supplement intake Physical Signs/Symptoms Outcomes: Weight, Biochemical data Discharge Planning:   
Continue current diet Electronically signed by Russell Wray RDN on 10/7/2020 Contact: [phone]

## 2020-10-07 NOTE — ROUTINE PROCESS
Per Dr Hallie Bedolla, kyphoplasty to be done 10/08/2020 0730 AM as pt consumed full breakfast today. Pt to be NPO after midnight in preparation for procedure. Continue to hold Eliquis. Anesthesia aware of case. Cosmo Velazquez RN updated on plan of care.

## 2020-10-07 NOTE — ROUTINE PROCESS
Spoke with Cosmo Velazquez RN on 5th floor regarding consult for kyphoplasty. Per Cosmo Velazquez, pt requires [facility] to maintain oxygen saturations and consumed a full breakfast tray this morning. Will discuss consult with Dr Hallie Bedolla and anesthesia and update when plan established.

## 2020-10-07 NOTE — PROGRESS NOTES
Problem: Self Care Deficits Care Plan (Adult) Goal: *Acute Goals and Plan of Care (Insert Text) Description:  
FUNCTIONAL STATUS PRIOR TO ADMISSION: Patient was modified independent using a rollator for functional mobility. Patient reports short distances only and requires lots of seated breaks on rollator. She states she manages her care independently but admits to difficulty with LB tasks (putting on socks and bathing LB). HOME SUPPORT PRIOR TO ADMISSION: The patient lived alone. Daughter is nearby. Reports she calls for security at [facility] when she needs help. Per pt report her and her daughter are looking into hiring private care aides for when she discharges. Occupational Therapy Goals Initiated 10/5/2020 1. Patient will perform grooming with modified independence from unsupported seated position within 7 day(s). 2.  Patient will perform upper body dressing and bathing with modified independence within 7 day(s). 3.  Patient will perform lower body dressing and bathing with moderate assistance using AE PRN within 7 day(s). 4.  Patient will perform toilet transfers to [facility] with moderate assistance  within 7 day(s). 5.  Patient will perform all aspects of toileting with moderate assistance  within 7 day(s). 6.  Patient will participate in upper extremity therapeutic exercise/activities with supervision/set-up for 10 minutes within 7 day(s). 7.  Patient will utilize energy conservation techniques during functional activities with verbal cues within 7 day(s). Outcome: Progressing Towards Goal 
 OCCUPATIONAL THERAPY TREATMENT Patient: Dunia Landry (62 y.o. female) Date: 10/7/2020 Diagnosis: Syncope and collapse [R55] <principal problem not specified> Procedure(s) (LRB): ESOPHAGOGASTRODUODENOSCOPY (EGD) (N/A) COLONOSCOPY (N/A) Precautions: Fall, Back, Skin Chart, occupational therapy assessment, plan of care, and goals were reviewed. ASSESSMENT Patient continues with skilled OT services and is progressing towards goals. Patient received supine in bed, with RN present, requesting assist for hygiene. Patient with bladder accident to bed, requiring change of pad on bed and assist with rolling. Patient requires mod assist x 2 for rolling left and right, though patient with good effort for UEs to bed rail. Patient total assist for hygiene. She is SOB with activity despite use of supplemental O2. Patient left in NAD, HOB elevated. Current Level of Function Impacting Discharge (ADLs): max/total assist for transfers and ADLs Other factors to consider for discharge: PLAN : 
Patient continues to benefit from skilled intervention to address the above impairments. Continue treatment per established plan of care. to address goals. Recommend with staff: UE ADLs as able Recommend next OT session: continue OT goals Recommendation for discharge: (in order for the patient to meet his/her long term goals) Therapy up to 5 days/week in SNF setting This discharge recommendation: 
Has been made in collaboration with the attending provider and/or case management IF patient discharges home will need the following DME: TBD SUBJECTIVE:  
Patient stated Im ok.  OBJECTIVE DATA SUMMARY:  
Cognitive/Behavioral Status: 
Neurologic State: Alert Orientation Level: Oriented X4 Cognition: Follows commands Perception: Appears intact Perseveration: No perseveration noted Safety/Judgement: Awareness of environment Functional Mobility and Transfers for ADLs: 
Bed Mobility: 
Rolling: Moderate assistance;Assist x2 Transfers: 
  
  
  
 
Balance: ADL Intervention: Toileting Bladder Hygiene: Total assistance (dependent) Clothing Management: Total assistance (dependent) Cues: Verbal cues provided(physical assistance ) Cognitive Retraining Safety/Judgement: Awareness of environment Therapeutic Exercises:  
 
 
Pain: 
 
 
Activity Tolerance:  
Fair and observed SOB with activity Please refer to the flowsheet for vital signs taken during this treatment. After treatment patient left in no apparent distress:  
Supine in bed, Call bell within reach, Bed / chair alarm activated, and Side rails x 3 
 
COMMUNICATION/COLLABORATION:  
The patients plan of care was discussed with: Registered nurse. Cade Bernabe OTR/L Time Calculation: 23 mins

## 2020-10-07 NOTE — PROGRESS NOTES
Problem: Mobility Impaired (Adult and Pediatric) Goal: *Acute Goals and Plan of Care (Insert Text) Description: FUNCTIONAL STATUS PRIOR TO ADMISSION: Patient was modified independent using a rollator for functional mobility. Patient reports short distances only and requires lots of seated breaks on rollator. HOME SUPPORT PRIOR TO ADMISSION: The patient lived alone. Daughter is nearby. Reports she calls for security at [facility] when she needs help. Per pt report her and her daughter are looking into hiring private care aides for when she discharges. Physical Therapy Goals Initiated 10/2/2020 1. Patient will move from supine to sit and sit to supine , scoot up and down, and roll side to side in bed with moderate assistance  within 7 day(s). 2.  Patient will transfer from bed to chair and chair to bed with moderate assistance  using the least restrictive device within 7 day(s). 3.  Patient will perform sit to stand with moderate assistance  within 7 day(s). 4.  Patient will ambulate with moderate assistance  for 15 feet with the least restrictive device within 7 day(s). Note: PHYSICAL THERAPY TREATMENT Patient: Bradley Escalante (89 y.o. female) Date: 10/7/2020 Diagnosis: Syncope and collapse [R55] <principal problem not specified> Procedure(s) (LRB): ESOPHAGOGASTRODUODENOSCOPY (EGD) (N/A) COLONOSCOPY (N/A) Precautions: Fall, Back, Skin Chart, physical therapy assessment, plan of care and goals were reviewed. ASSESSMENT Patient continues with skilled PT services. Pt performed LE AAROM exercise with cues. Pt supine to sit with max to total assist.Pt reporting very significant back pain semi silting and requested return to supine needing max to total assist.Pt continues to struggle with back pain limiting ability to sit. Progress slow. Continue goals. Current Level of Function Impacting Discharge (mobility/balance): max to total assist supine to sit.  
 
 
    
 
PLAN : 
 Patient continues to benefit from skilled intervention to address the above impairments. Continue treatment per established plan of care. to address goals. Recommendation for discharge: (in order for the patient to meet his/her long term goals) Therapy up to 5 days/week in SNF setting This discharge recommendation: 
Has been made in collaboration with the attending provider and/or case management IF patient discharges home will need the following DME: to be determined (TBD) SUBJECTIVE:  
 
 
OBJECTIVE DATA SUMMARY:  
Critical Behavior: 
Neurologic State: Alert Orientation Level: Oriented X4 Cognition: Follows commands Safety/Judgement: Awareness of environment Functional Mobility Training: 
Bed Mobility: 
Rolling: Moderate assistance;Assist x2 Supine to Sit: Maximum assistance; Total assistance Sit to Supine: Maximum assistance; Total assistance Activity Tolerance:  
Poor Please refer to the flowsheet for vital signs taken during this treatment. After treatment patient left in no apparent distress:  
Supine in bed COMMUNICATION/COLLABORATION:  
The patients plan of care was discussed with: Physical therapist.  
 
Rebecca Good PTA Time Calculation: 23 mins

## 2020-10-07 NOTE — PROGRESS NOTES
2:55 PM 
RUR 28% Transition of Care Plan 1. Monitor patient status and response to treatment. 2. Medical management and evaluation continues. 3. Accepted at [email or web address] for SNF/Rehab-1st COVID test received, waiting on 2nd test- will be drawn at 6:00PM 
4. Also Accepted at [facility], [address] [facility] . 5. Will completed CM assessment when patient available. Alem Segura

## 2020-10-07 NOTE — PROGRESS NOTES
Daily Progress Note: 10/7/2020 [place] PCP: 
Queen Ashlyn MD 
 
Assessment/Plan:  
Acute Syncopal Episode - likely due to low BP from anemia 
- monitor/tx as needed Hypotension - resolved 
- off pressors 10/2 CAD- stable CHF 
- Diuretics as needed Chronic A-fib with acute exacerbations of RVR 
- Holding Eliquis - Cards consulted - Metoprolol BID Acute on chronic anemia, with hemorrhagic shock 10/1/20 
- Transfuse as needed;  
- GI consult - colonoscopy planned at some point - currently on hold - PPI Dehydration 
- IVF 
 
DM2 
- SSI, Monitor BG ac and hs 
 
HAYES - CPAP Depression/Anxiety - Remeron and Cymbata Nondisplaced acute fracture L3 superior endplate. 
- poss kyphoplasty when more stable - Ortho has seen Problem List: 
Problem List as of 10/7/2020 Date Reviewed: 5/29/2020 Codes Class Noted - Resolved Syncope and collapse ICD-10-CM: R55 
ICD-9-CM: 780.2  9/29/2020 - Present Cellulitis of lower extremity ICD-10-CM: L03.119 ICD-9-CM: 682.6  3/23/2020 - Present ASO (arteriosclerosis obliterans) ICD-10-CM: I70.90 ICD-9-CM: 440.9  9/5/2019 - Present PVD (peripheral vascular disease) ([facility] ICD-10-CM: I73.9 ICD-9-CM: 443.9  8/1/2019 - Present Severe obesity ([facility] ICD-10-CM: E66.01 
ICD-9-CM: 278.01  7/30/2019 - Present UTI (urinary tract infection) ICD-10-CM: N39.0 ICD-9-CM: 599.0  7/13/2019 - Present COPD (chronic obstructive pulmonary disease) ([facility]) ICD-10-CM: J44.9 ICD-9-CM: 727  7/13/2019 - Present Sepsis ([facility] ICD-10-CM: A41.9 ICD-9-CM: 038.9, 995.91  7/13/2019 - Present Normocytic anemia ICD-10-CM: D64.9 ICD-9-CM: 285.9  7/13/2019 - Present PAD (peripheral artery disease) (HCC) ICD-10-CM: I73.9 ICD-9-CM: 443.9  7/13/2019 - Present Mild intermittent asthma ICD-10-CM: J45.20 ICD-9-CM: 493.90  5/17/2019 - Present  Gangrene of finger of right hand ([facility] ICD-10-CM: M85 
 ICD-9-CM: 785.4  5/17/2019 - Present Brachial artery thrombus (HCC) ICD-10-CM: B87.3 ICD-9-CM: 444.21  4/26/2019 - Present Sleep apnea ICD-10-CM: G47.30 ICD-9-CM: 780.57  1/5/2019 - Present Overview Signed 1/5/2019  8:41 PM by William Flores DO  
  wears CPAP Atrial fibrillation [facility]) ICD-10-CM: I48.91 
ICD-9-CM: 427.31  11/16/2018 - Present Obesity (BMI 30-39.9) (Chronic) ICD-10-CM: B51.6 ICD-9-CM: 278.00  11/13/2018 - Present Type 2 diabetes with nephropathy ([facility] ICD-10-CM: E11.21 
ICD-9-CM: 250.40, 583.81  10/1/2018 - Present Recurrent deep vein thrombosis (DVT) (HCC) ICD-10-CM: I82.409 ICD-9-CM: 453.40  3/30/2018 - Present Chronic anticoagulation (Chronic) ICD-10-CM: Z79.01 
ICD-9-CM: V58.61  3/30/2018 - Present Coronary artery disease involving native coronary artery without angina pectoris (Chronic) ICD-10-CM: I25.10 ICD-9-CM: 414.01  1/22/2016 - Present Essential hypertension (Chronic) ICD-10-CM: I10 
ICD-9-CM: 401.9  1/22/2016 - Present CAD (coronary artery disease) ICD-10-CM: I25.10 ICD-9-CM: 414.00  10/9/2015 - Present Type II diabetes mellitus (HCC) (Chronic) ICD-10-CM: E11.9 ICD-9-CM: 250.00  10/9/2015 - Present RESOLVED: Cellulitis ICD-10-CM: L03.90 ICD-9-CM: 682.9  3/23/2020 - 5/29/2020 RESOLVED: Hypokalemia ICD-10-CM: E87.6 ICD-9-CM: 276.8  3/23/2020 - 5/29/2020 RESOLVED: Hyponatremia ICD-10-CM: E87.1 ICD-9-CM: 276.1  3/23/2020 - 5/29/2020 RESOLVED: Diarrhea ICD-10-CM: R19.7 ICD-9-CM: 787.91  3/23/2020 - 5/29/2020 RESOLVED: Syncope and collapse ICD-10-CM: R55 
ICD-9-CM: 780.2  7/13/2019 - 5/29/2020 RESOLVED: Leg wound, left ICD-10-CM: L07.675O ICD-9-CM: 891.0  7/13/2019 - 5/29/2020 RESOLVED: Leg laceration, right, initial encounter ICD-10-CM: U43.317N ICD-9-CM: 894.0  7/13/2019 - 5/29/2020 RESOLVED: Cellulitis of right upper arm ICD-10-CM: L03.113 ICD-9-CM: 682.3  5/17/2019 - 5/29/2020 RESOLVED: Bowel obstruction ([facility] ICD-10-CM: A66.325 ICD-9-CM: 560.9  1/8/2019 - 5/29/2020 RESOLVED: Partial small bowel obstruction ([facility] ICD-10-CM: K56.600 ICD-9-CM: 560.9  1/5/2019 - 5/29/2020 RESOLVED: Dyspnea ICD-10-CM: R06.00 
ICD-9-CM: 786.09  11/13/2018 - 5/29/2020 RESOLVED: ARF (acute renal failure) (HCC) ICD-10-CM: N17.9 ICD-9-CM: 584.9  11/13/2018 - 5/29/2020 RESOLVED: Closed wedge compression fracture of T7 vertebra ([facility] ICD-10-CM: O99.988M ICD-9-CM: 805.2  11/13/2018 - 5/29/2020 RESOLVED: Chest pain ICD-10-CM: R07.9 ICD-9-CM: 786.50  6/27/2018 - 5/29/2020 RESOLVED: Abdominal pain ICD-10-CM: R10.9 ICD-9-CM: 789.00  6/27/2018 - 5/29/2020 RESOLVED: HTN (hypertension) ICD-10-CM: I10 
ICD-9-CM: 401.9  10/9/2015 - 1/22/2016 RESOLVED: NSTEMI (non-ST elevated myocardial infarction) ([facility] ICD-10-CM: I21.4 ICD-9-CM: 410.70  10/9/2015 - 5/29/2020 HPI:  
 53-year-old female with multiple medical problems including obesity, sleep apnea, non-ST-elevation myocardial infarction, primary hypertension, congestive heart failure, gastroesophageal reflux disease, type 2 diabetes mellitus, temporal arteritis, fibromyalgia, atrial fibrillation and asthma, was brought to the emergency room from home for complaints of lightheadedness, dizziness with subsequent fall and low back pain. Patient stated that whilst preparing some dinner in the evening hours of 09/29/2020, had the onset of symptoms with subsequent fall in a sitting position. Thereafter, patient noted persistent low back pain. Patient denied any recent changes in medication. Denied headaches, visual deficits, chest pain, palpitations, sore throat, cough, shortness of breath, nausea, vomiting, fevers, chills, abdominal pain, bladder or bowel irregularities. (Dr Brianne Kaiser) 9/30: She is c/o pain in her back. Meds are helping. Hb 5.1 and she has been transfused with 2 units. AM labs pending and she will need a line as she is a difficult stick. May need more blood. She reports her stools have been dark so will ask GI to see. Holding Eliquis. She is on chronic steroids due to TA.  
 
10/1:  Heart rate up to 180s this AM - seems to have spiked up due to back pain but now back in 110s. No other complaint except back pain. No chest pain or SOB. Hb 7.1 this AM - watching. GI planning colonoscopy - starting prep. Poss kyphoplasty when more stable. 10/2:  Reports she feels \"just bad all over\" this AM.  Last night became hypotensive, dropped Hb again, required another transfusion of 1 unit, and Roland had to be started to maintain BP - remains on Roland this AM.  Also mult episodes of RVR of her A fib - Cards treating and Dilt drip started. Transferred to ICU. Hb up to 8.8 this AM.  Tmax 99. WBC up a little from previous -watching. 10/3:  Feeling much better this AM and sitting up eating breakfast.  Back in sinus rhythm with rate in 80s at this time. BP better. Off pressors. Off IV dilt today and on BB. Hb 8.0. Hopefully out of ICU later today or tomorrow. 10/4:  Continues to feel better. BP ok. Out of ICU today. 10/5:  Little change from yesterday. No new complaints. Still c/o back pain with movement. She may be stable enough for the GI work up and for Kyphoplasty - per GI and IR. Hb 8.0. 
 
10/6:  AM labs pending. Still c/o pain. Holding anticoag. More fatigued. 10/7:  Still c/o back pain with movement. She would like to have the kyphoplasty done prior to going to rehab. Anticoags have been held and Hb has been stable last few days so perhaps IR can do the kyphoplasty - will consult them. Covid testing for SNF being done. Review of Systems: A comprehensive review of systems was negative except for that written in the HPI. Objective: Physical Exam:  
Visit Vitals BP (!) 144/72 Pulse 88 Temp 97.6 °F (36.4 °C) Resp 18 Ht 5' 7\" (1.702 m) Wt 115.8 kg (255 lb 4.7 oz) SpO2 95% BMI 39.98 kg/m² O2 Flow Rate (L/min): 3 l/min O2 Device: Nasal cannula Temp (24hrs), Av.2 °F (36.8 °C), Min:97.6 °F (36.4 °C), Max:99.2 °F (37.3 °C) 
  10/06 1901 - 10/07 0700 In: 48 [P.O.:50] Out: 700 [Urine:700]   10/05 0701 - 10/06 1900 In: 155 [P.O.:874] Out: 300 [Urine:300] General:  Alert, cooperative, no distress, obese Head:  Normocephalic, without obvious abnormality, atraumatic. Eyes:  Conjunctivae/corneas clear. PERRL, EOMs intact. Throat: Lips, mucosa, and tongue normal. Teeth and gums normal.  
Neck: Supple, symmetrical, trachea midline, no adenopathy, thyroid: no enlargement/tenderness/nodules, no carotid bruit and no JVD. Lungs:   Clear to auscultation bilaterally. Chest wall:  No tenderness or deformity. Heart:  Currently in sinus rhythm with rate ok, no murmur, click, rub or gallop. Abdomen:   Soft, non-tender. Bowel sounds normal. No masses,  No organomegaly. Extremities: Extremities with weeping and swelling, mildly erythematous, dressed. Less edema. No calf tenderness or cords. Pulses: 1+ and symmetric all extremities. Skin: Skin color, texture, turgor normal.   
Neurologic:   Alert and oriented X 3. Fine motor of hands and fingers normal.   equal.  No cogwheeling or rigidity. Gait not tested at this time. Sensation grossly normal to touch. Gross motor of extremities normal.    
 
Data Review:  
CT Head IMPRESSION: No acute findings. EXAM:  CT CERVICAL SPINE WITHOUT CONTRAST INDICATION: fall w head injury. COMPARISON: None. CONTRAST:  None. FINDINGS: 
Alignment is satisfactory, no acute fracture or dislocation. Moderate spondylosis and disc degeneration at C5-6 IMPRESSION 
 impression: No acute changes. CT LS spine Axial CT scan of the lumbar sacral spine obtained without contrast with coronal 
and sagittal reconstructions. No prior. CT dose reduction was achieved through 
the use of a standardized protocol tailored for this examination and automatic 
exposure control for dose modulation . Rosa Daigle There is a minimally displaced fracture of the superior endplate of L3 without 
significant canal compromise. There is some mild diffuse spondylosis and disc degeneration. IMPRESSION 
 impression: Nondisplaced acute fracture L3 superior endplate. Recent Days: 
Recent Labs 10/05/20 
0417 WBC 10.2 HGB 8.0*  
HCT 29.3*  
 Recent Labs 10/05/20 
0417   
K 4.4  
 CO2 30  
* BUN 28* CREA 1.15* CA 8.0* ALB 2.0*  
TBILI 0.8 ALT 28  
24 Hour Results: 
Recent Results (from the past 24 hour(s)) GLUCOSE, POC Collection Time: 10/06/20  6:50 AM  
Result Value Ref Range Glucose (POC) 206 (H) 65 - 100 mg/dL Performed by Kiersten Vega, POC Collection Time: 10/06/20 10:59 AM  
Result Value Ref Range Glucose (POC) 263 (H) 65 - 100 mg/dL Performed by Seward Freeze (CON) GLUCOSE, POC Collection Time: 10/06/20  5:19 PM  
Result Value Ref Range Glucose (POC) 180 (H) 65 - 100 mg/dL Performed by Guy Seip (PCT) SARS-COV-2 Collection Time: 10/06/20  6:13 PM  
Result Value Ref Range Specimen source Nasopharyngeal    
 SARS-CoV-2 PENDING   
 SARS-CoV-2 PENDING Specimen source Nasopharyngeal    
 COVID-19 rapid test PENDING Specimen type NP Swab Health status PENDING   
 COVID-19 PENDING   
GLUCOSE, POC Collection Time: 10/06/20  9:27 PM  
Result Value Ref Range Glucose (POC) 227 (H) 65 - 100 mg/dL Performed by Davon Bourgeois (PCT) Medications reviewed Current Facility-Administered Medications Medication Dose Route Frequency  hydrocortisone Sod Succ (PF) (SOLU-CORTEF) injection 25 mg  25 mg IntraVENous Q8H  
  cyanocobalamin (VITAMIN B12) tablet 1,000 mcg  1,000 mcg Oral DAILY  ferrous sulfate tablet 325 mg  1 Tab Oral DAILY WITH BREAKFAST  metoprolol tartrate (LOPRESSOR) tablet 25 mg  25 mg Oral Q12H  pantoprazole (PROTONIX) tablet 40 mg  40 mg Oral ACB&D  
 0.9% sodium chloride infusion 250 mL  250 mL IntraVENous PRN  
 arformoteroL (BROVANA) neb solution 15 mcg  15 mcg Nebulization BID RT  
 budesonide (PULMICORT) 500 mcg/2 ml nebulizer suspension  500 mcg Nebulization BID RT  
 ipratropium (ATROVENT) 0.02 % nebulizer solution 0.5 mg  0.5 mg Nebulization Q6H RT  
 digoxin (LANOXIN) tablet 0.125 mg  0.125 mg Oral DAILY  influenza vaccine 2020-21 (6 mos+)(PF) (FLUARIX/FLULAVAL/FLUZONE QUAD) injection 0.5 mL  0.5 mL IntraMUSCular PRIOR TO DISCHARGE  metoprolol tartrate (LOPRESSOR) tablet 25 mg  25 mg Oral DAILY PRN  
 mirtazapine (REMERON) tablet 15 mg  15 mg Oral QHS  DULoxetine (CYMBALTA) capsule 60 mg  60 mg Oral DAILY  montelukast (SINGULAIR) tablet 10 mg  10 mg Oral DAILY  HYDROcodone-acetaminophen (NORCO) 5-325 mg per tablet 1 Tab  1 Tab Oral Q4H PRN  
 [Held by provider] furosemide (LASIX) tablet 20 mg  20 mg Oral DAILY  morphine injection 1 mg  1 mg IntraVENous Q6H PRN  
 0.9% sodium chloride infusion 250 mL  250 mL IntraVENous PRN  
 sodium chloride (NS) flush 5-40 mL  5-40 mL IntraVENous Q8H  
 sodium chloride (NS) flush 5-40 mL  5-40 mL IntraVENous PRN  
 acetaminophen (TYLENOL) tablet 650 mg  650 mg Oral Q6H PRN Or  
 acetaminophen (TYLENOL) suppository 650 mg  650 mg Rectal Q6H PRN  polyethylene glycol (MIRALAX) packet 17 g  17 g Oral DAILY PRN  promethazine (PHENERGAN) tablet 12.5 mg  12.5 mg Oral Q6H PRN  Or  
 ondansetron (ZOFRAN) injection 4 mg  4 mg IntraVENous Q6H PRN  
 glucose chewable tablet 16 g  4 Tab Oral PRN  
 dextrose (D50W) injection syrg 12.5-25 g  25-50 mL IntraVENous PRN  
 glucagon (GLUCAGEN) injection 1 mg  1 mg IntraMUSCular PRN  
  insulin lispro (HUMALOG) injection   SubCUTAneous AC&HS  
 0.9% sodium chloride infusion 250 mL  250 mL IntraVENous PRN Care Plan discussed with: Patient and Nurse Total time spent with patient and review of records: 30 minutes.  
Ilene Troncoso MD

## 2020-10-08 NOTE — PERIOP NOTES
Spoke to patient's daughter Mata Daniels and gave her an update on patient condition, any and all questions answered. Patient will be transferred back to medical bed for further medical management. Will continue to monitor pt.

## 2020-10-08 NOTE — PROGRESS NOTES
Daily Progress Note: 10/8/2020 Evelynn Barthel PCP: 
Yvon Andrea MD 
 
Assessment/Plan:  
Acute Syncopal Episode - likely due to low BP from anemia 
- monitor/tx as needed Hypotension - resolved 
- off pressors 10/2 CAD- stable CHF 
- Diuretics as needed Chronic A-fib with acute exacerbations of RVR 
- Holding Eliquis - Cards consulted - Metoprolol BID Acute on chronic anemia, with hemorrhagic shock 10/1/20 
- Transfuse as needed;  
- GI consult - colonoscopy planned at some point - currently on hold - PPI Dehydration 
- IVF 
 
DM2 
- SSI, Monitor BG ac and hs 
 
HAYES - CPAP Depression/Anxiety - Remeron and Cymbata Nondisplaced acute fracture L3 superior endplate. 
- kyphoplasty planned 10/8 by IR 
- Ortho has seen Problem List: 
Problem List as of 10/8/2020 Date Reviewed: 5/29/2020 Codes Class Noted - Resolved Syncope and collapse ICD-10-CM: R55 
ICD-9-CM: 780.2  9/29/2020 - Present Cellulitis of lower extremity ICD-10-CM: L03.119 ICD-9-CM: 682.6  3/23/2020 - Present ASO (arteriosclerosis obliterans) ICD-10-CM: I70.90 ICD-9-CM: 440.9  9/5/2019 - Present PVD (peripheral vascular disease) ([facility] ICD-10-CM: I73.9 ICD-9-CM: 443.9  8/1/2019 - Present Severe obesity ([facility] ICD-10-CM: E66.01 
ICD-9-CM: 278.01  7/30/2019 - Present UTI (urinary tract infection) ICD-10-CM: N39.0 ICD-9-CM: 599.0  7/13/2019 - Present COPD (chronic obstructive pulmonary disease) (HCC) ICD-10-CM: J44.9 ICD-9-CM: 462  7/13/2019 - Present Sepsis ([facility] ICD-10-CM: A41.9 ICD-9-CM: 038.9, 995.91  7/13/2019 - Present Normocytic anemia ICD-10-CM: D64.9 ICD-9-CM: 285.9  7/13/2019 - Present PAD (peripheral artery disease) (HCC) ICD-10-CM: I73.9 ICD-9-CM: 443.9  7/13/2019 - Present Mild intermittent asthma ICD-10-CM: J45.20 ICD-9-CM: 493.90  5/17/2019 - Present  Gangrene of finger of right hand ([facility] ICD-10-CM: O88 
 ICD-9-CM: 785.4  5/17/2019 - Present Brachial artery thrombus (HCC) ICD-10-CM: O43.2 ICD-9-CM: 444.21  4/26/2019 - Present Sleep apnea ICD-10-CM: G47.30 ICD-9-CM: 780.57  1/5/2019 - Present Overview Signed 1/5/2019  8:41 PM by Lakshmi Joyner DO  
  wears CPAP Atrial fibrillation [facility]) ICD-10-CM: I48.91 
ICD-9-CM: 427.31  11/16/2018 - Present Obesity (BMI 30-39.9) (Chronic) ICD-10-CM: Y85.1 ICD-9-CM: 278.00  11/13/2018 - Present Type 2 diabetes with nephropathy ([facility] ICD-10-CM: E11.21 
ICD-9-CM: 250.40, 583.81  10/1/2018 - Present Recurrent deep vein thrombosis (DVT) (HCC) ICD-10-CM: I82.409 ICD-9-CM: 453.40  3/30/2018 - Present Chronic anticoagulation (Chronic) ICD-10-CM: Z79.01 
ICD-9-CM: V58.61  3/30/2018 - Present Coronary artery disease involving native coronary artery without angina pectoris (Chronic) ICD-10-CM: I25.10 ICD-9-CM: 414.01  1/22/2016 - Present Essential hypertension (Chronic) ICD-10-CM: I10 
ICD-9-CM: 401.9  1/22/2016 - Present CAD (coronary artery disease) ICD-10-CM: I25.10 ICD-9-CM: 414.00  10/9/2015 - Present Type II diabetes mellitus (HCC) (Chronic) ICD-10-CM: E11.9 ICD-9-CM: 250.00  10/9/2015 - Present RESOLVED: Cellulitis ICD-10-CM: L03.90 ICD-9-CM: 682.9  3/23/2020 - 5/29/2020 RESOLVED: Hypokalemia ICD-10-CM: E87.6 ICD-9-CM: 276.8  3/23/2020 - 5/29/2020 RESOLVED: Hyponatremia ICD-10-CM: E87.1 ICD-9-CM: 276.1  3/23/2020 - 5/29/2020 RESOLVED: Diarrhea ICD-10-CM: R19.7 ICD-9-CM: 787.91  3/23/2020 - 5/29/2020 RESOLVED: Syncope and collapse ICD-10-CM: R55 
ICD-9-CM: 780.2  7/13/2019 - 5/29/2020 RESOLVED: Leg wound, left ICD-10-CM: A65.614Q ICD-9-CM: 891.0  7/13/2019 - 5/29/2020 RESOLVED: Leg laceration, right, initial encounter ICD-10-CM: J92.490G ICD-9-CM: 894.0  7/13/2019 - 5/29/2020 RESOLVED: Cellulitis of right upper arm ICD-10-CM: L03.113 ICD-9-CM: 682.3  5/17/2019 - 5/29/2020 RESOLVED: Bowel obstruction ([facility] ICD-10-CM: F04.099 ICD-9-CM: 560.9  1/8/2019 - 5/29/2020 RESOLVED: Partial small bowel obstruction ([facility] ICD-10-CM: K56.600 ICD-9-CM: 560.9  1/5/2019 - 5/29/2020 RESOLVED: Dyspnea ICD-10-CM: R06.00 
ICD-9-CM: 786.09  11/13/2018 - 5/29/2020 RESOLVED: ARF (acute renal failure) (HCC) ICD-10-CM: N17.9 ICD-9-CM: 584.9  11/13/2018 - 5/29/2020 RESOLVED: Closed wedge compression fracture of T7 vertebra ([facility] ICD-10-CM: H78.724P ICD-9-CM: 805.2  11/13/2018 - 5/29/2020 RESOLVED: Chest pain ICD-10-CM: R07.9 ICD-9-CM: 786.50  6/27/2018 - 5/29/2020 RESOLVED: Abdominal pain ICD-10-CM: R10.9 ICD-9-CM: 789.00  6/27/2018 - 5/29/2020 RESOLVED: HTN (hypertension) ICD-10-CM: I10 
ICD-9-CM: 401.9  10/9/2015 - 1/22/2016 RESOLVED: NSTEMI (non-ST elevated myocardial infarction) ([facility] ICD-10-CM: I21.4 ICD-9-CM: 410.70  10/9/2015 - 5/29/2020 HPI:  
 53-year-old female with multiple medical problems including obesity, sleep apnea, non-ST-elevation myocardial infarction, primary hypertension, congestive heart failure, gastroesophageal reflux disease, type 2 diabetes mellitus, temporal arteritis, fibromyalgia, atrial fibrillation and asthma, was brought to the emergency room from home for complaints of lightheadedness, dizziness with subsequent fall and low back pain. Patient stated that whilst preparing some dinner in the evening hours of 09/29/2020, had the onset of symptoms with subsequent fall in a sitting position. Thereafter, patient noted persistent low back pain. Patient denied any recent changes in medication. Denied headaches, visual deficits, chest pain, palpitations, sore throat, cough, shortness of breath, nausea, vomiting, fevers, chills, abdominal pain, bladder or bowel irregularities. (Dr Robi Wilde) 9/30: She is c/o pain in her back. Meds are helping. Hb 5.1 and she has been transfused with 2 units. AM labs pending and she will need a line as she is a difficult stick. May need more blood. She reports her stools have been dark so will ask GI to see. Holding Eliquis. She is on chronic steroids due to TA.  
 
10/1:  Heart rate up to 180s this AM - seems to have spiked up due to back pain but now back in 110s. No other complaint except back pain. No chest pain or SOB. Hb 7.1 this AM - watching. GI planning colonoscopy - starting prep. Poss kyphoplasty when more stable. 10/2:  Reports she feels \"just bad all over\" this AM.  Last night became hypotensive, dropped Hb again, required another transfusion of 1 unit, and Roland had to be started to maintain BP - remains on Roland this AM.  Also mult episodes of RVR of her A fib - Cards treating and Dilt drip started. Transferred to ICU. Hb up to 8.8 this AM.  Tmax 99. WBC up a little from previous -watching. 10/3:  Feeling much better this AM and sitting up eating breakfast.  Back in sinus rhythm with rate in 80s at this time. BP better. Off pressors. Off IV dilt today and on BB. Hb 8.0. Hopefully out of ICU later today or tomorrow. 10/4:  Continues to feel better. BP ok. Out of ICU today. 10/5:  Little change from yesterday. No new complaints. Still c/o back pain with movement. She may be stable enough for the GI work up and for Kyphoplasty - per GI and IR. Hb 8.0. 
 
10/6:  AM labs pending. Still c/o pain. Holding anticoag. More fatigued. 10/7:  Still c/o back pain with movement. She would like to have the kyphoplasty done prior to going to rehab. Anticoags have been held and Hb has been stable last few days so perhaps IR can do the kyphoplasty - will consult them. Covid testing for SNF being done. 10/8:  Little change except in better spirits today. Kyphoplasty planned for today. Covid testing for SNF pending as of this AM. Review of Systems: A comprehensive review of systems was negative except for that written in the HPI. Objective:  
Physical Exam:  
Visit Vitals BP (!) 153/76 (BP 1 Location: Left arm, BP Patient Position: At rest) Pulse 86 Temp 98 °F (36.7 °C) Resp 16 Ht 5' 7\" (1.702 m) Wt 116.9 kg (257 lb 11.2 oz) SpO2 97% BMI 40.36 kg/m² O2 Flow Rate (L/min): 3 l/min O2 Device: Nasal cannula Temp (24hrs), Av.7 °F (37.1 °C), Min:98 °F (36.7 °C), Max:99.6 °F (37.6 °C) 
  10/07 1901 - 10/08 0700 In: -  
Out: 700 [Urine:700]   10/06 0701 - 10/07 1900 In: 924 [P.O.:924] Out: 1000 [Urine:1000] General:  Alert, cooperative, no distress, obese Head:  Normocephalic, without obvious abnormality, atraumatic. Eyes:  Conjunctivae/corneas clear. PERRL, EOMs intact. Throat: Lips, mucosa, and tongue normal. Teeth and gums normal.  
Neck: Supple, symmetrical, trachea midline, no adenopathy, thyroid: no enlargement/tenderness/nodules, no carotid bruit and no JVD. Lungs:   Clear to auscultation bilaterally. Chest wall:  No tenderness or deformity. Heart:  Currently in sinus rhythm with rate ok, no murmur, click, rub or gallop. Abdomen:   Soft, non-tender. Bowel sounds normal. No masses,  No organomegaly. Extremities: Extremities with weeping and swelling, mildly erythematous, dressed. Less edema. No calf tenderness or cords. Pulses: 1+ and symmetric all extremities. Skin: Skin color, texture, turgor normal.   
Neurologic:   Alert and oriented X 3. Fine motor of hands and fingers normal.   equal.  No cogwheeling or rigidity. Gait not tested at this time. Sensation grossly normal to touch. Gross motor of extremities normal.    
 
Data Review:  
CT Head IMPRESSION: No acute findings. EXAM:  CT CERVICAL SPINE WITHOUT CONTRAST INDICATION: fall w head injury. COMPARISON: None. CONTRAST:  None. FINDINGS: 
Alignment is satisfactory, no acute fracture or dislocation. Moderate spondylosis and disc degeneration at C5-6 IMPRESSION 
 impression: No acute changes. CT LS spine Axial CT scan of the lumbar sacral spine obtained without contrast with coronal 
and sagittal reconstructions. No prior. CT dose reduction was achieved through 
the use of a standardized protocol tailored for this examination and automatic 
exposure control for dose modulation . Sraoj Ramon There is a minimally displaced fracture of the superior endplate of L3 without 
significant canal compromise. There is some mild diffuse spondylosis and disc degeneration. IMPRESSION 
 impression: Nondisplaced acute fracture L3 superior endplate. Recent Days: 
No results for input(s): WBC, HGB, HCT, PLT, HGBEXT, HCTEXT, PLTEXT, HGBEXT, HCTEXT, PLTEXT in the last 72 hours. No results for input(s): NA, K, CL, CO2, GLU, BUN, CREA, CA, MG, PHOS, ALB, TBIL, TBILI, ALT, INR, INREXT, INREXT in the last 72 hours. No lab exists for component: SGOT24 Hour Results: 
Recent Results (from the past 24 hour(s)) GLUCOSE, POC Collection Time: 10/07/20  7:13 AM  
Result Value Ref Range Glucose (POC) 186 (H) 65 - 100 mg/dL Performed by Naman Garcia (PCT) GLUCOSE, POC Collection Time: 10/07/20 11:19 AM  
Result Value Ref Range Glucose (POC) 163 (H) 65 - 100 mg/dL Performed by [employer] (PCT) GLUCOSE, POC Collection Time: 10/07/20  4:34 PM  
Result Value Ref Range Glucose (POC) 208 (H) 65 - 100 mg/dL Performed by Saroj Jade SARS-COV-2 Collection Time: 10/07/20  7:35 PM  
Result Value Ref Range Specimen source Nasopharyngeal    
 SARS-CoV-2 PENDING   
 SARS-CoV-2 PENDING Specimen source Nasopharyngeal    
 COVID-19 rapid test PENDING Specimen type NP Swab Health status PENDING   
 COVID-19 PENDING   
GLUCOSE, POC Collection Time: 10/07/20  9:12 PM  
Result Value Ref Range Glucose (POC) 183 (H) 65 - 100 mg/dL Performed by [employer] (PCT) Medications reviewed Current Facility-Administered Medications Medication Dose Route Frequency  hydrocortisone Sod Succ (PF) (SOLU-CORTEF) injection 25 mg  25 mg IntraVENous Q8H  
 cyanocobalamin (VITAMIN B12) tablet 1,000 mcg  1,000 mcg Oral DAILY  ferrous sulfate tablet 325 mg  1 Tab Oral DAILY WITH BREAKFAST  metoprolol tartrate (LOPRESSOR) tablet 25 mg  25 mg Oral Q12H  pantoprazole (PROTONIX) tablet 40 mg  40 mg Oral ACB&D  
 0.9% sodium chloride infusion 250 mL  250 mL IntraVENous PRN  
 arformoteroL (BROVANA) neb solution 15 mcg  15 mcg Nebulization BID RT  
 budesonide (PULMICORT) 500 mcg/2 ml nebulizer suspension  500 mcg Nebulization BID RT  
 ipratropium (ATROVENT) 0.02 % nebulizer solution 0.5 mg  0.5 mg Nebulization Q6H RT  
 digoxin (LANOXIN) tablet 0.125 mg  0.125 mg Oral DAILY  influenza vaccine 2020-21 (6 mos+)(PF) (FLUARIX/FLULAVAL/FLUZONE QUAD) injection 0.5 mL  0.5 mL IntraMUSCular PRIOR TO DISCHARGE  metoprolol tartrate (LOPRESSOR) tablet 25 mg  25 mg Oral DAILY PRN  
 mirtazapine (REMERON) tablet 15 mg  15 mg Oral QHS  DULoxetine (CYMBALTA) capsule 60 mg  60 mg Oral DAILY  montelukast (SINGULAIR) tablet 10 mg  10 mg Oral DAILY  HYDROcodone-acetaminophen (NORCO) 5-325 mg per tablet 1 Tab  1 Tab Oral Q4H PRN  
 [Held by provider] furosemide (LASIX) tablet 20 mg  20 mg Oral DAILY  morphine injection 1 mg  1 mg IntraVENous Q6H PRN  
 0.9% sodium chloride infusion 250 mL  250 mL IntraVENous PRN  
 sodium chloride (NS) flush 5-40 mL  5-40 mL IntraVENous Q8H  
 sodium chloride (NS) flush 5-40 mL  5-40 mL IntraVENous PRN  
 acetaminophen (TYLENOL) tablet 650 mg  650 mg Oral Q6H PRN Or  
 acetaminophen (TYLENOL) suppository 650 mg  650 mg Rectal Q6H PRN  polyethylene glycol (MIRALAX) packet 17 g  17 g Oral DAILY PRN  promethazine (PHENERGAN) tablet 12.5 mg  12.5 mg Oral Q6H PRN  Or  
  ondansetron (ZOFRAN) injection 4 mg  4 mg IntraVENous Q6H PRN  
 glucose chewable tablet 16 g  4 Tab Oral PRN  
 dextrose (D50W) injection syrg 12.5-25 g  25-50 mL IntraVENous PRN  
 glucagon (GLUCAGEN) injection 1 mg  1 mg IntraMUSCular PRN  
 insulin lispro (HUMALOG) injection   SubCUTAneous AC&HS  
 0.9% sodium chloride infusion 250 mL  250 mL IntraVENous PRN Care Plan discussed with: Patient and Nurse Total time spent with patient and review of records: 30 minutes.  
Kate Burris MD

## 2020-10-08 NOTE — ROUTINE PROCESS
Bedside and Verbal shift change report given to Monet Martin RN  (oncoming nurse) by Lucas Fischer RN (offgoing nurse). Report included the following information SBAR, Kardex, MAR and Recent Results.

## 2020-10-08 NOTE — PROGRESS NOTES
TRANSFER - IN REPORT: 
 
Verbal report received from ed w, rn  on [address]  [facility] Report consisted of patients Situation, Background, Assessment and  
Recommendations(SBAR). Information from the following report(s) SBAR, Kardex, Intake/Output and MAR was reviewed with the receiving nurse. Opportunity for questions and clarification was provided. Assessment completed upon patients arrival to unit and care assumed.

## 2020-10-08 NOTE — ANESTHESIA POSTPROCEDURE EVALUATION
* No procedures listed *. 
 
general 
 
Anesthesia Post Evaluation Multimodal analgesia: multimodal analgesia used between 6 hours prior to anesthesia start to PACU discharge Patient location during evaluation: PACU Patient participation: complete - patient participated Level of consciousness: awake and alert Pain management: adequate Airway patency: patent Anesthetic complications: no 
Cardiovascular status: acceptable Respiratory status: acceptable Hydration status: acceptable Post anesthesia nausea and vomiting:  none INITIAL Post-op Vital signs:  
Vitals Value Taken Time BP Temp Pulse 90 10/8/2020  8:57 AM  
Resp 27 10/8/2020  8:57 AM  
SpO2 85 % 10/8/2020  8:57 AM  
Vitals shown include unvalidated device data.

## 2020-10-08 NOTE — PROGRESS NOTES
10/8/2020   CARE MANAGEMENT NOTE:  CM reviewed EMR and handoff received from previous  Caitlin Davalos). Pt was admitted with syncope and collapse. Also with L3 compression fx and is s/p Kyphoplasty on 10/8. Reportedly, pt resides alone. RUR 31%; LOS 8 days Transition Plan of Care: 1. SNF - [facility]. Pt has been resistant to SNF. 2.  COVID 19 test - 10/6 negative; 10/7 test results pending (Gretel requires two negative tests within 24 hrs) 3. Backup [facility] only[facility] 4.  Outpt f/u 5. Pt may require [facility] transportation upon discharge. CM will continue to follow pt for SNF placement. Jeanette

## 2020-10-08 NOTE — H&P
Radiology History and Physical 
 
Patient: Mady Shoulder 69 y.o. female Chief Complaint: Dizziness; Nausea; and Fall History of Present Illness: 69year old female who had a ground level fall. MRI shows acute L3 compression fracture. No acute neuro deficits. Has chronic paresthesia and weakness of the BLEs. Denies chest pain, abdominal pain. History: 
 
Past Medical History:  
Diagnosis Date  Asthma  Atrial fibrillation ([facility] 11/16/2018  Autoimmune disease ([facility]   
 fibromyalgia  CAD (coronary artery disease) 10/9/2015  Closed wedge compression fracture of T7 vertebra ([facility] 11/13/2018  Diabetes ([facility]  DVT (deep vein thrombosis) in pregnancy  GERD (gastroesophageal reflux disease)  Heart failure ([facility]  History of vascular access device 09/30/2020  
 4f midline, 12cm at 1cm out placed in L Cephalic by Ronald Encarnacion RN  
 HTN (hypertension)  NSTEMI (non-ST elevated myocardial infarction) ([facility] 10/9/2015  Obesity (BMI 30-39.9) 11/13/2018  Sleep apnea   
 wears CPAP Family History Problem Relation Age of Onset  Diabetes Mother  Heart Failure Mother  Kidney Disease Mother  Diabetes Father  Heart Disease Father  Elevated Lipids Father  Heart Failure Father  Heart Disease Brother  Cancer Brother   
     kidney  Diabetes Brother  No Known Problems Brother  No Known Problems Brother Social History Socioeconomic History  Marital status:  Spouse name: Not on file  Number of children: Not on file  Years of education: Not on file  Highest education level: Not on file Occupational History  Not on file Social Needs  Financial resource strain: Not on file  Food insecurity Worry: Not on file Inability: Not on file  Transportation needs Medical: Not on file Non-medical: Not on file Tobacco Use  Smoking status: Former Smoker Last attempt to quit: 3/30/2017 Years since quitting: 3.5  Smokeless tobacco: Never Used Substance and Sexual Activity  Alcohol use: No  
  Alcohol/week: 0.0 standard drinks Comment: very very rarely  Drug use: No  
 Sexual activity: Never Lifestyle  Physical activity Days per week: Not on file Minutes per session: Not on file  Stress: Not on file Relationships  Social connections Talks on phone: Not on file Gets together: Not on file Attends Spiritism service: Not on file Active member of club or organization: Not on file Attends meetings of clubs or organizations: Not on file Relationship status: Not on file  Intimate partner violence Fear of current or ex partner: Not on file Emotionally abused: Not on file Physically abused: Not on file Forced sexual activity: Not on file Other Topics Concern [address] Service Not Asked  Blood Transfusions Not Asked  Caffeine Concern Not Asked  Occupational Exposure Not Asked Dasie Hanks Hazards Not Asked  Sleep Concern Not Asked  Stress Concern Not Asked  Weight Concern Not Asked  Special Diet Not Asked  Back Care Not Asked  Exercise Not Asked  Bike Helmet Not Asked  Seat Belt Not Asked  Self-Exams Not Asked Social History Narrative  Not on file Allergies: Allergies Allergen Reactions  Advair Diskus [Fluticasone Propion-Salmeterol] Rash  Aspartame Other (comments)  Breo Ellipta [Fluticasone Furoate-Vilanterol] Rash  Bumex [Bumetanide] Myalgia  Ciprofloxacin Rash  Statins-Hmg-Coa Reductase Inhibitors Other (comments) Muscle pain Lipitor/crestor/zocor  Sulfa (Sulfonamide Antibiotics) Rash  Tetracycline Other (comments) musclepain  Torsemide Rash and Myalgia  Zetia [Ezetimibe] Myalgia Current Medications: 
Current Facility-Administered Medications Medication Dose Route Frequency  hydrocortisone Sod Succ (PF) (SOLU-CORTEF) injection 25 mg  25 mg IntraVENous Q8H  
 cyanocobalamin (VITAMIN B12) tablet 1,000 mcg  1,000 mcg Oral DAILY  ferrous sulfate tablet 325 mg  1 Tab Oral DAILY WITH BREAKFAST  metoprolol tartrate (LOPRESSOR) tablet 25 mg  25 mg Oral Q12H  pantoprazole (PROTONIX) tablet 40 mg  40 mg Oral ACB&D  
 0.9% sodium chloride infusion 250 mL  250 mL IntraVENous PRN  
 arformoteroL (BROVANA) neb solution 15 mcg  15 mcg Nebulization BID RT  
 budesonide (PULMICORT) 500 mcg/2 ml nebulizer suspension  500 mcg Nebulization BID RT  
 ipratropium (ATROVENT) 0.02 % nebulizer solution 0.5 mg  0.5 mg Nebulization Q6H RT  
 digoxin (LANOXIN) tablet 0.125 mg  0.125 mg Oral DAILY  influenza vaccine 2020-21 (6 mos+)(PF) (FLUARIX/FLULAVAL/FLUZONE QUAD) injection 0.5 mL  0.5 mL IntraMUSCular PRIOR TO DISCHARGE  metoprolol tartrate (LOPRESSOR) tablet 25 mg  25 mg Oral DAILY PRN  
 mirtazapine (REMERON) tablet 15 mg  15 mg Oral QHS  DULoxetine (CYMBALTA) capsule 60 mg  60 mg Oral DAILY  montelukast (SINGULAIR) tablet 10 mg  10 mg Oral DAILY  HYDROcodone-acetaminophen (NORCO) 5-325 mg per tablet 1 Tab  1 Tab Oral Q4H PRN  
 [Held by provider] furosemide (LASIX) tablet 20 mg  20 mg Oral DAILY  morphine injection 1 mg  1 mg IntraVENous Q6H PRN  
 0.9% sodium chloride infusion 250 mL  250 mL IntraVENous PRN  
 sodium chloride (NS) flush 5-40 mL  5-40 mL IntraVENous Q8H  
 sodium chloride (NS) flush 5-40 mL  5-40 mL IntraVENous PRN  
 acetaminophen (TYLENOL) tablet 650 mg  650 mg Oral Q6H PRN Or  
 acetaminophen (TYLENOL) suppository 650 mg  650 mg Rectal Q6H PRN  polyethylene glycol (MIRALAX) packet 17 g  17 g Oral DAILY PRN  promethazine (PHENERGAN) tablet 12.5 mg  12.5 mg Oral Q6H PRN  Or  
 ondansetron (ZOFRAN) injection 4 mg  4 mg IntraVENous Q6H PRN  
 glucose chewable tablet 16 g  4 Tab Oral PRN  
  dextrose (D50W) injection syrg 12.5-25 g  25-50 mL IntraVENous PRN  
 glucagon (GLUCAGEN) injection 1 mg  1 mg IntraMUSCular PRN  
 insulin lispro (HUMALOG) injection   SubCUTAneous AC&HS  
 0.9% sodium chloride infusion 250 mL  250 mL IntraVENous PRN Facility-Administered Medications Ordered in Other Encounters Medication Dose Route Frequency  midazolam (VERSED) injection   IntraVENous PRN  
 fentaNYL citrate (PF) injection   IntraVENous PRN  
 lidocaine (PF) (XYLOCAINE) 20 mg/mL (2 %) injection   IntraVENous PRN  
 rocuronium injection   IntraVENous PRN  
 succinylcholine (ANECTINE) injection   IntraVENous PRN  propofoL (DIPRIVAN) 10 mg/mL injection   IntraVENous PRN  
 lactated Ringers infusion   IntraVENous CONTINUOUS  
 ePHEDrine in NS (PF) (MISTOLE) 10 mg/mL in NS syringe   IntraVENous PRN  
 PHENYLephrine (NEOSYNEPHRINE) in NS syringe   IntraVENous PRN  
 PHENYLephrine (IVÁN-SYNEPHRINE) 10 mg in 0.9% sodium chloride 100 mL infusion   IntraVENous CONTINUOUS Physical Exam: 
Blood pressure (!) 135/35, pulse (!) 103, temperature 97.7 °F (36.5 °C), resp. rate 20, height 5' 7\" (1.702 m), weight 116.9 kg (257 lb 11.2 oz), SpO2 97 %. GENERAL: alert, cooperative, no distress, appears stated age, LUNG: Nonlabored respiration on room air HEART: regular rate and rhythm, R radial & R DP pulse 2/2 EXT: No edema BLEs ABD: Nontender, nondistended Intact motor and sensation in the feet bilaterally Alerts:   
Hospital Problems  Date Reviewed: 5/29/2020 Codes Class Noted POA Syncope and collapse ICD-10-CM: R55 
ICD-9-CM: 780.2  9/29/2020 Unknown Laboratory:     
Recent Labs 10/08/20 
8760 HGB 8.7* HCT 31.6* WBC 11.0  BUN 30* CREA 1.15* K 3.9 Plan of Care/Planned Procedure: 
Risks, benefits, and alternatives reviewed with patient and she agrees to proceed with the procedure. L3 kyphoplasty Plan for general anesthesia due to respiratory issues and other comorbidities. Rosi Peña MD 
Interventional Radiology [address] [facility] 
8:54 AM, 10/8/2020

## 2020-10-08 NOTE — PROGRESS NOTES
Occupational Therapy Chart reviewed in prep for skilled OT treatment; however, pt had kyphoplasty done this morning and reports high fatigue associated with procedure, requesting defer at this time. Pt verbalizes eagerness to engage in therapy tomorrow morning, as well as, reports of no back pain during bed mobility s/p surgery. Will defer this date and follow up tomorrow.  
 
Thank you, 
Morenita Vazquez, OT

## 2020-10-08 NOTE — PROGRESS NOTES
7:36 AM 
TRANSFER - IN REPORT: 
 
Verbal report received from ED, RN(name) on [address] [facility]  being received from 5 th floor(unit) for routine progression of care Report consisted of patients Situation, Background, Assessment and  
Recommendations(SBAR). Information from the following report(s) Pre Procedure Checklist and Procedure Verification was reviewed with the receiving nurse. Opportunity for questions and clarification was provided. Assessment completed upon patients arrival to unit and care assumed. 56- Dr Radha Lopez bedside for anesthesia consent 18- Dr Josselin Lu bedside for MD exam and procedure consent 7:41 AM 
TRANSFER - OUT REPORT: 
 
Verbal report given to Niki Dia and Whitley RN(name) on [address] [facility]  being transferred to Angio(unit) for routine progression of care Report consisted of patients Situation, Background, Assessment and  
Recommendations(SBAR). Information from the following report(s) Pre Procedure Checklist and Procedure Verification was reviewed with the receiving nurse. Lines:  
Peripheral IV 10/07/20 Anterior; Left Forearm (Active) Site Assessment Clean, dry, & intact 10/08/20 0400 Phlebitis Assessment 0 10/08/20 0400 Infiltration Assessment 0 10/08/20 0400 Dressing Status Clean, dry, & intact 10/08/20 0400 Dressing Type Transparent;Tape 10/08/20 0400 Hub Color/Line Status Pink 10/08/20 0400 Alcohol Cap Used Yes 10/08/20 0400 Opportunity for questions and clarification was provided. Patient transported with: 
 Registered Nurse

## 2020-10-08 NOTE — ANESTHESIA PREPROCEDURE EVALUATION
Anesthetic History No history of anesthetic complications Review of Systems / Medical History Patient summary reviewed and pertinent labs reviewed Pulmonary COPD: moderate Sleep apnea: CPAP Shortness of breath Asthma Pertinent negatives: No recent URI Comments: COPD, wears 3L NC at home 24/7 Neuro/Psych Within defined limits Cardiovascular Hypertension Dysrhythmias : atrial fibrillation Past MI (2015), CAD, PAD, cardiac stents (x2 in 2015, x1 in 2018) and hyperlipidemia Exercise tolerance: <4 METS Comments: Afib, rate controlled and anticoagulated (plavix held) GI/Hepatic/Renal 
  
GERD Renal disease: CRI Hiatal hernia Comments: Renal insufficiency, Cr.  Endo/Other Diabetes Morbid obesity, blood dyscrasia and anemia (Hgb 7.2) Other Findings Comments: Fibromyalgia Daily prednisone use for temporal arteritis History of recurrent DVT, thrombectomy and finger amp in April Physical Exam 
 
Airway Mallampati: III 
TM Distance: 4 - 6 cm Neck ROM: normal range of motion, short neck Mouth opening: Normal 
 
 Cardiovascular Rhythm: regular Rate: normal 
 
 
 
 Dental 
 
Dentition: Lower dentition intact, Upper dentition intact and Caps/crowns Pulmonary Breath sounds clear to auscultation Abdominal 
GI exam deferred Other Findings Anesthetic Plan ASA: 4 Anesthesia type: general 
 
 
 
Post procedure ventilation Induction: Intravenous Anesthetic plan and risks discussed with: Patient

## 2020-10-08 NOTE — PROGRESS NOTES
0765 
 
Pt received from 516 , and to be prepared for Kyphoplasty L3. Consents obtained . Anesthesia pre op

## 2020-10-08 NOTE — PROCEDURES
Interventional Radiology Procedure Note 
 
 
 
10/8/2020 8:56 AM 
 
Patient: Modesto Hashimoto Informed consent obtained Diagnosis: L3 compression fracture Procedure(s): L3 kyphoplasty Specimens removed:  none Complications: None Primary Physician: Medardo Santillan MD 
 
Recomendations: N/A Discharge Disposition: Stable; recovery in PACU then back to floor Full dictated report to follow Medardo Santillan MD 
Interventional Radiology [facility] 
8:56 AM, 10/8/2020

## 2020-10-08 NOTE — ROUTINE PROCESS
TRANSFER - OUT REPORT: 
 
Verbal report given to MADAI Lott(name) on Ivett Finney  being transferred to [facility](unit) for routine post - op Report consisted of patients Situation, Background, Assessment and  
Recommendations(SBAR). Information from the following report(s) SBAR and MAR was reviewed with the receiving nurse. Opportunity for questions and clarification was provided. Patient transported with: 
 O2 @ 3 liters Registered Nurse

## 2020-10-09 NOTE — PROGRESS NOTES
10/9/2020   CARE MANAGEMENT NOTE:  CM reviewed EMR for clinical updates. Pt was admitted with syncope and collapse. Also with L3 compression fx and is s/p Kyphoplasty on 10/8. Reportedly, pt resides alone. 
  
RUR 29%; LOS 9 days 
  
Transition Plan of Care: 1. SNF - [facility] accepted and pt is now in agreement. 2.  COVID 19 test for SNF placement: 10/6 negative; 10/7 test negative. Of importance, [facility] requires two consecutive COVID tests within 24 hours of one another and with second result 24 hours prior to hospital discharge. 3.  Outpt f/u 4. [facility] transportation upon discharge. 
  
CM will continue to follow pt for SNF placement. Jeanette

## 2020-10-09 NOTE — PROGRESS NOTES
Daily Progress Note: 10/9/2020 Ivett Finney PCP: 
Lucas Broussard MD 
 
Assessment/Plan:  
Acute Syncopal Episode - likely due to low BP from anemia 
- monitor/tx as needed Hypotension - resolved 
- off pressors 10/2 CAD- stable CHF 
- Diuretics as needed Chronic A-fib with acute exacerbations of RVR 
- Holding Eliquis - Cards consulted - Metoprolol BID Acute on chronic anemia, with hemorrhagic shock 10/1/20 
- Transfuse as needed;  
- GI consult - colonoscopy planned at some point - currently on hold - PPI Dehydration 
- IVF 
 
DM2 
- SSI, Monitor BG ac and hs 
 
HAYES - CPAP Depression/Anxiety - Remeron and Cymbata Nondisplaced acute fracture L3 superior endplate. 
- kyphoplasty planned 10/8 by IR 
- Ortho has seen Problem List: 
Problem List as of 10/9/2020 Date Reviewed: 5/29/2020 Codes Class Noted - Resolved Syncope and collapse ICD-10-CM: R55 
ICD-9-CM: 780.2  9/29/2020 - Present Cellulitis of lower extremity ICD-10-CM: L03.119 ICD-9-CM: 682.6  3/23/2020 - Present ASO (arteriosclerosis obliterans) ICD-10-CM: I70.90 ICD-9-CM: 440.9  9/5/2019 - Present PVD (peripheral vascular disease) ([facility] ICD-10-CM: I73.9 ICD-9-CM: 443.9  8/1/2019 - Present Severe obesity ([facility] ICD-10-CM: E66.01 
ICD-9-CM: 278.01  7/30/2019 - Present UTI (urinary tract infection) ICD-10-CM: N39.0 ICD-9-CM: 599.0  7/13/2019 - Present COPD (chronic obstructive pulmonary disease) (HCC) ICD-10-CM: J44.9 ICD-9-CM: 422  7/13/2019 - Present Sepsis ([facility] ICD-10-CM: A41.9 ICD-9-CM: 038.9, 995.91  7/13/2019 - Present Normocytic anemia ICD-10-CM: D64.9 ICD-9-CM: 285.9  7/13/2019 - Present PAD (peripheral artery disease) (HCC) ICD-10-CM: I73.9 ICD-9-CM: 443.9  7/13/2019 - Present Mild intermittent asthma ICD-10-CM: J45.20 ICD-9-CM: 493.90  5/17/2019 - Present  Gangrene of finger of right hand ([facility] ICD-10-CM: R43 
 ICD-9-CM: 785.4  5/17/2019 - Present Brachial artery thrombus (HCC) ICD-10-CM: I37.5 ICD-9-CM: 444.21  4/26/2019 - Present Sleep apnea ICD-10-CM: G47.30 ICD-9-CM: 780.57  1/5/2019 - Present Overview Signed 1/5/2019  8:41 PM by Izabela Doe, DO  
  wears CPAP Atrial fibrillation [facility]) ICD-10-CM: I48.91 
ICD-9-CM: 427.31  11/16/2018 - Present Obesity (BMI 30-39.9) (Chronic) ICD-10-CM: U72.5 ICD-9-CM: 278.00  11/13/2018 - Present Type 2 diabetes with nephropathy ([facility] ICD-10-CM: E11.21 
ICD-9-CM: 250.40, 583.81  10/1/2018 - Present Recurrent deep vein thrombosis (DVT) (HCC) ICD-10-CM: I82.409 ICD-9-CM: 453.40  3/30/2018 - Present Chronic anticoagulation (Chronic) ICD-10-CM: Z79.01 
ICD-9-CM: V58.61  3/30/2018 - Present Coronary artery disease involving native coronary artery without angina pectoris (Chronic) ICD-10-CM: I25.10 ICD-9-CM: 414.01  1/22/2016 - Present Essential hypertension (Chronic) ICD-10-CM: I10 
ICD-9-CM: 401.9  1/22/2016 - Present CAD (coronary artery disease) ICD-10-CM: I25.10 ICD-9-CM: 414.00  10/9/2015 - Present Type II diabetes mellitus (HCC) (Chronic) ICD-10-CM: E11.9 ICD-9-CM: 250.00  10/9/2015 - Present RESOLVED: Cellulitis ICD-10-CM: L03.90 ICD-9-CM: 682.9  3/23/2020 - 5/29/2020 RESOLVED: Hypokalemia ICD-10-CM: E87.6 ICD-9-CM: 276.8  3/23/2020 - 5/29/2020 RESOLVED: Hyponatremia ICD-10-CM: E87.1 ICD-9-CM: 276.1  3/23/2020 - 5/29/2020 RESOLVED: Diarrhea ICD-10-CM: R19.7 ICD-9-CM: 787.91  3/23/2020 - 5/29/2020 RESOLVED: Syncope and collapse ICD-10-CM: R55 
ICD-9-CM: 780.2  7/13/2019 - 5/29/2020 RESOLVED: Leg wound, left ICD-10-CM: A63.348Z ICD-9-CM: 891.0  7/13/2019 - 5/29/2020 RESOLVED: Leg laceration, right, initial encounter ICD-10-CM: D41.464D ICD-9-CM: 894.0  7/13/2019 - 5/29/2020 RESOLVED: Cellulitis of right upper arm ICD-10-CM: L03.113 ICD-9-CM: 682.3  5/17/2019 - 5/29/2020 RESOLVED: Bowel obstruction ([facility] ICD-10-CM: V77.293 ICD-9-CM: 560.9  1/8/2019 - 5/29/2020 RESOLVED: Partial small bowel obstruction ([facility] ICD-10-CM: K56.600 ICD-9-CM: 560.9  1/5/2019 - 5/29/2020 RESOLVED: Dyspnea ICD-10-CM: R06.00 
ICD-9-CM: 786.09  11/13/2018 - 5/29/2020 RESOLVED: ARF (acute renal failure) (HCC) ICD-10-CM: N17.9 ICD-9-CM: 584.9  11/13/2018 - 5/29/2020 RESOLVED: Closed wedge compression fracture of T7 vertebra ([facility] ICD-10-CM: I32.299G ICD-9-CM: 805.2  11/13/2018 - 5/29/2020 RESOLVED: Chest pain ICD-10-CM: R07.9 ICD-9-CM: 786.50  6/27/2018 - 5/29/2020 RESOLVED: Abdominal pain ICD-10-CM: R10.9 ICD-9-CM: 789.00  6/27/2018 - 5/29/2020 RESOLVED: HTN (hypertension) ICD-10-CM: I10 
ICD-9-CM: 401.9  10/9/2015 - 1/22/2016 RESOLVED: NSTEMI (non-ST elevated myocardial infarction) ([facility] ICD-10-CM: I21.4 ICD-9-CM: 410.70  10/9/2015 - 5/29/2020 HPI:  
 68-year-old female with multiple medical problems including obesity, sleep apnea, non-ST-elevation myocardial infarction, primary hypertension, congestive heart failure, gastroesophageal reflux disease, type 2 diabetes mellitus, temporal arteritis, fibromyalgia, atrial fibrillation and asthma, was brought to the emergency room from home for complaints of lightheadedness, dizziness with subsequent fall and low back pain. Patient stated that whilst preparing some dinner in the evening hours of 09/29/2020, had the onset of symptoms with subsequent fall in a sitting position. Thereafter, patient noted persistent low back pain. Patient denied any recent changes in medication. Denied headaches, visual deficits, chest pain, palpitations, sore throat, cough, shortness of breath, nausea, vomiting, fevers, chills, abdominal pain, bladder or bowel irregularities. (Dr Matheus Metcalf) 9/30: She is c/o pain in her back. Meds are helping. Hb 5.1 and she has been transfused with 2 units. AM labs pending and she will need a line as she is a difficult stick. May need more blood. She reports her stools have been dark so will ask GI to see. Holding Eliquis. She is on chronic steroids due to TA.  
 
10/1:  Heart rate up to 180s this AM - seems to have spiked up due to back pain but now back in 110s. No other complaint except back pain. No chest pain or SOB. Hb 7.1 this AM - watching. GI planning colonoscopy - starting prep. Poss kyphoplasty when more stable. 10/2:  Reports she feels \"just bad all over\" this AM.  Last night became hypotensive, dropped Hb again, required another transfusion of 1 unit, and Roland had to be started to maintain BP - remains on Roland this AM.  Also mult episodes of RVR of her A fib - Cards treating and Dilt drip started. Transferred to ICU. Hb up to 8.8 this AM.  Tmax 99. WBC up a little from previous -watching. 10/3:  Feeling much better this AM and sitting up eating breakfast.  Back in sinus rhythm with rate in 80s at this time. BP better. Off pressors. Off IV dilt today and on BB. Hb 8.0. Hopefully out of ICU later today or tomorrow. 10/4:  Continues to feel better. BP ok. Out of ICU today. 10/5:  Little change from yesterday. No new complaints. Still c/o back pain with movement. She may be stable enough for the GI work up and for Kyphoplasty - per GI and IR. Hb 8.0. 
 
10/6:  AM labs pending. Still c/o pain. Holding anticoag. More fatigued. 10/7:  Still c/o back pain with movement. She would like to have the kyphoplasty done prior to going to rehab. Anticoags have been held and Hb has been stable last few days so perhaps IR can do the kyphoplasty - will consult them. Covid testing for SNF being done. 10/8:  Little change except in better spirits today. Kyphoplasty planned for today. Covid testing for SNF pending as of this AM. 10/9:  Marked improvement in back pain with Kyphoplasty done yesterday. No back pain at all this AM so far. She wants to do more with PT/OT today. 2nd Covid negative. Stable for rehab. Review of Systems: A comprehensive review of systems was negative except for that written in the HPI. Objective:  
Physical Exam:  
Visit Vitals /71 (BP 1 Location: Left arm, BP Patient Position: At rest) Pulse 86 Temp 98.2 °F (36.8 °C) Resp 18 Ht 5' 7\" (1.702 m) Wt 116 kg (255 lb 11.2 oz) SpO2 95% BMI 40.05 kg/m² O2 Flow Rate (L/min): 3 l/min O2 Device: Nasal cannula Temp (24hrs), Av.9 °F (36.6 °C), Min:97.6 °F (36.4 °C), Max:98.2 °F (36.8 °C) 10/08 1901 - 10/09 0700 In: 480 [P.O.:480] Out: 300 [Urine:300]   10/07 0701 - 10/08 1900 In: 800 [I.V.:800] Out: 900 [Urine:900] General:  Alert, cooperative, no distress, obese Head:  Normocephalic, without obvious abnormality, atraumatic. Eyes:  Conjunctivae/corneas clear. PERRL, EOMs intact. Throat: Lips, mucosa, and tongue normal. Teeth and gums normal.  
Neck: Supple, symmetrical, trachea midline, no adenopathy, thyroid: no enlargement/tenderness/nodules, no carotid bruit and no JVD. Lungs:   Clear to auscultation bilaterally. Chest wall:  No tenderness or deformity. Heart:  Currently in sinus rhythm with rate ok, no murmur, click, rub or gallop. Abdomen:   Soft, non-tender. Bowel sounds normal. No masses,  No organomegaly. Extremities: Extremities with weeping and swelling, mildly erythematous, dressed. Less edema. No calf tenderness or cords. Pulses: 1+ and symmetric all extremities. Skin: Skin color, texture, turgor normal.   
Neurologic:   Alert and oriented X 3. Fine motor of hands and fingers normal.   equal.  No cogwheeling or rigidity. Gait not tested at this time. Sensation grossly normal to touch. Gross motor of extremities normal.    
 
Data Review:  
CT Head IMPRESSION: No acute findings. EXAM:  CT CERVICAL SPINE WITHOUT CONTRAST INDICATION: fall w head injury. COMPARISON: None. CONTRAST:  None. FINDINGS: 
Alignment is satisfactory, no acute fracture or dislocation. Moderate spondylosis and disc degeneration at C5-6 IMPRESSION 
 impression: No acute changes. CT LS spine Axial CT scan of the lumbar sacral spine obtained without contrast with coronal 
and sagittal reconstructions. No prior. CT dose reduction was achieved through 
the use of a standardized protocol tailored for this examination and automatic 
exposure control for dose modulation . Harley Tasha There is a minimally displaced fracture of the superior endplate of L3 without 
significant canal compromise. There is some mild diffuse spondylosis and disc degeneration. IMPRESSION 
 impression: Nondisplaced acute fracture L3 superior endplate. Kyphoplasty L3 10/8/20 Procedure: 1. Fluoroscopy guided placement of cannulas into the bilateral L3 pedicles. 2. Balloon kyphoplasty of the L3 vertebral body with cement augmentation. 3. Sedation provided by the anesthesia department. FLUOROSCOPY TIME: 8.0 min FLUOROSCOPY DOSE: 1382 mGy Body: 
Following discussion of risks, benefits and alternatives, informed written 
consent was obtained. The patient was placed prone on the fluoroscopic exam table and prepped and 
draped in sterile fashion. The correct level was identified fluoroscopically. 1% lidocaine was administered to the subcutaneous tissues for local anesthesia, 
and 0.25% bupivacaine was administered into the periosteum of of the bilateral 
pedicles under fluoroscopic guidance using a 22-gauge 10 cm spinal needle for 
local anesthesia. Under biplane fluoroscopic guidance, the left L3 pedicle was 
traversed with a Kyphon cannula. The tip of the cannula was placed just within 
the vertebral body.  A drill was then advanced to within the anterior portion of 
 the vertebral body. This procedure was repeated on the right L3 pedicle. Into each cannula, a kyphoplasty balloon was inserted. Both balloons were 
inflated with to a pressure of approximately 200 PSI towards the anterior part 
of the vertebral body. The balloons were deflated and retracted slightly, just 
underneath the deformed portion of the superior endplate and reinflated slowly. There is slight reduction of the deformity. The right balloon was then removed, 
the left balloons remained in place and inflated while cement was injected into 
the right cannula under live fluoroscopic examination. The left-sided balloon 
was then deflated and removed, while the cement was injected into the left 
cannula under live fluoroscopic guidance. The devices were removed and a 
dressing was applied. There were no immediate complications. There was a small 
amount of extravasation of cement into some small paravertebral venous branches, 
however this was self-limited and there was no distal cement embolization. A 
small amount cement was also noted under live fluoroscopy examination to track 
along the right pedicle access tract. Impression: Kyphoplasty of the L3 vertebral body. Recent Days: 
Recent Labs 10/09/20 
0606 10/08/20 
2243 WBC 10.5 11.0 HGB 8.3* 8.7* HCT 30.1* 31.6*  
 238 Recent Labs 10/08/20 
7049   
K 3.9 * CO2 33* * BUN 30* CREA 1.15* CA 7.8* ALB 2.1* TBILI 0.9 ALT 46  
24 Hour Results: 
Recent Results (from the past 24 hour(s)) CBC WITH AUTOMATED DIFF Collection Time: 10/08/20  6:51 AM  
Result Value Ref Range WBC 11.0 3.6 - 11.0 K/uL  
 RBC 3.54 (L) 3.80 - 5.20 M/uL HGB 8.7 (L) 11.5 - 16.0 g/dL HCT 31.6 (L) 35.0 - 47.0 % MCV 89.3 80.0 - 99.0 FL  
 MCH 24.6 (L) 26.0 - 34.0 PG  
 MCHC 27.5 (L) 30.0 - 36.5 g/dL RDW 22.5 (H) 11.5 - 14.5 % PLATELET 932 915 - 264 K/uL MPV 10.0 8.9 - 12.9 FL  
 NRBC 0.0 0  WBC ABSOLUTE NRBC 0.00 0.00 - 0.01 K/uL NEUTROPHILS 86 (H) 32 - 75 % LYMPHOCYTES 7 (L) 12 - 49 % MONOCYTES 5 5 - 13 % EOSINOPHILS 1 0 - 7 % BASOPHILS 0 0 - 1 % IMMATURE GRANULOCYTES 1 (H) 0.0 - 0.5 % ABS. NEUTROPHILS 9.4 (H) 1.8 - 8.0 K/UL  
 ABS. LYMPHOCYTES 0.8 0.8 - 3.5 K/UL  
 ABS. MONOCYTES 0.6 0.0 - 1.0 K/UL  
 ABS. EOSINOPHILS 0.1 0.0 - 0.4 K/UL  
 ABS. BASOPHILS 0.0 0.0 - 0.1 K/UL  
 ABS. IMM. GRANS. 0.1 (H) 0.00 - 0.04 K/UL  
 DF SMEAR SCANNED    
 RBC COMMENTS ANISOCYTOSIS 2+ 
    
 RBC COMMENTS HYPOCHROMIA 1+ METABOLIC PANEL, COMPREHENSIVE Collection Time: 10/08/20  6:51 AM  
Result Value Ref Range Sodium 144 136 - 145 mmol/L Potassium 3.9 3.5 - 5.1 mmol/L Chloride 109 (H) 97 - 108 mmol/L  
 CO2 33 (H) 21 - 32 mmol/L Anion gap 2 (L) 5 - 15 mmol/L Glucose 174 (H) 65 - 100 mg/dL BUN 30 (H) 6 - 20 MG/DL Creatinine 1.15 (H) 0.55 - 1.02 MG/DL  
 BUN/Creatinine ratio 26 (H) 12 - 20 GFR est AA 56 (L) >60 ml/min/1.73m2 GFR est non-AA 46 (L) >60 ml/min/1.73m2 Calcium 7.8 (L) 8.5 - 10.1 MG/DL Bilirubin, total 0.9 0.2 - 1.0 MG/DL  
 ALT (SGPT) 46 12 - 78 U/L  
 AST (SGOT) 36 15 - 37 U/L Alk. phosphatase 242 (H) 45 - 117 U/L Protein, total 4.9 (L) 6.4 - 8.2 g/dL Albumin 2.1 (L) 3.5 - 5.0 g/dL Globulin 2.8 2.0 - 4.0 g/dL A-G Ratio 0.8 (L) 1.1 - 2.2 GLUCOSE, POC Collection Time: 10/08/20  7:42 AM  
Result Value Ref Range Glucose (POC) 168 (H) 65 - 100 mg/dL Performed by Kendy Aguirre GLUCOSE, POC Collection Time: 10/08/20  9:05 AM  
Result Value Ref Range Glucose (POC) 140 (H) 65 - 100 mg/dL Performed by Bianka Starr GLUCOSE, POC Collection Time: 10/08/20 12:10 PM  
Result Value Ref Range Glucose (POC) 140 (H) 65 - 100 mg/dL Performed by Rajeev Redding (PCT) GLUCOSE, POC Collection Time: 10/08/20  5:00 PM  
Result Value Ref Range Glucose (POC) 86 65 - 100 mg/dL Performed by Rajeev Redding (PCT) GLUCOSE, POC Collection Time: 10/08/20  9:02 PM  
Result Value Ref Range Glucose (POC) 126 (H) 65 - 100 mg/dL Performed by Nina Fountain (PCT) CBC WITH AUTOMATED DIFF Collection Time: 10/09/20  6:06 AM  
Result Value Ref Range WBC 10.5 3.6 - 11.0 K/uL  
 RBC 3.36 (L) 3.80 - 5.20 M/uL HGB 8.3 (L) 11.5 - 16.0 g/dL HCT 30.1 (L) 35.0 - 47.0 % MCV 89.6 80.0 - 99.0 FL  
 MCH 24.7 (L) 26.0 - 34.0 PG  
 MCHC 27.6 (L) 30.0 - 36.5 g/dL RDW 23.0 (H) 11.5 - 14.5 % PLATELET 836 139 - 759 K/uL MPV 10.5 8.9 - 12.9 FL  
 NRBC 0.0 0  WBC ABSOLUTE NRBC 0.00 0.00 - 0.01 K/uL NEUTROPHILS PENDING % LYMPHOCYTES PENDING % MONOCYTES PENDING % EOSINOPHILS PENDING % BASOPHILS PENDING % IMMATURE GRANULOCYTES PENDING %  
 ABS. NEUTROPHILS PENDING K/UL  
 ABS. LYMPHOCYTES PENDING K/UL  
 ABS. MONOCYTES PENDING K/UL  
 ABS. EOSINOPHILS PENDING K/UL  
 ABS. BASOPHILS PENDING K/UL  
 ABS. IMM. GRANS. PENDING K/UL  
 DF PENDING Medications reviewed Current Facility-Administered Medications Medication Dose Route Frequency  HYDROmorphone (DILAUDID) syringe 0.5-1 mg  0.5-1 mg IntraVENous Multiple  ondansetron (ZOFRAN) injection 4 mg  4 mg IntraVENous PRN  
 hydrocortisone Sod Succ (PF) (SOLU-CORTEF) injection 25 mg  25 mg IntraVENous Q8H  
 cyanocobalamin (VITAMIN B12) tablet 1,000 mcg  1,000 mcg Oral DAILY  ferrous sulfate tablet 325 mg  1 Tab Oral DAILY WITH BREAKFAST  metoprolol tartrate (LOPRESSOR) tablet 25 mg  25 mg Oral Q12H  pantoprazole (PROTONIX) tablet 40 mg  40 mg Oral ACB&D  
 0.9% sodium chloride infusion 250 mL  250 mL IntraVENous PRN  
 arformoteroL (BROVANA) neb solution 15 mcg  15 mcg Nebulization BID RT  
 budesonide (PULMICORT) 500 mcg/2 ml nebulizer suspension  500 mcg Nebulization BID RT  
 ipratropium (ATROVENT) 0.02 % nebulizer solution 0.5 mg  0.5 mg Nebulization Q6H RT  
 digoxin (LANOXIN) tablet 0.125 mg  0.125 mg Oral DAILY  influenza vaccine 2020-21 (6 mos+)(PF) (FLUARIX/FLULAVAL/FLUZONE QUAD) injection 0.5 mL  0.5 mL IntraMUSCular PRIOR TO DISCHARGE  metoprolol tartrate (LOPRESSOR) tablet 25 mg  25 mg Oral DAILY PRN  
 mirtazapine (REMERON) tablet 15 mg  15 mg Oral QHS  DULoxetine (CYMBALTA) capsule 60 mg  60 mg Oral DAILY  montelukast (SINGULAIR) tablet 10 mg  10 mg Oral DAILY  HYDROcodone-acetaminophen (NORCO) 5-325 mg per tablet 1 Tab  1 Tab Oral Q4H PRN  
 [Held by provider] furosemide (LASIX) tablet 20 mg  20 mg Oral DAILY  morphine injection 1 mg  1 mg IntraVENous Q6H PRN  
 0.9% sodium chloride infusion 250 mL  250 mL IntraVENous PRN  
 sodium chloride (NS) flush 5-40 mL  5-40 mL IntraVENous Q8H  
 sodium chloride (NS) flush 5-40 mL  5-40 mL IntraVENous PRN  
 acetaminophen (TYLENOL) tablet 650 mg  650 mg Oral Q6H PRN Or  
 acetaminophen (TYLENOL) suppository 650 mg  650 mg Rectal Q6H PRN  polyethylene glycol (MIRALAX) packet 17 g  17 g Oral DAILY PRN  promethazine (PHENERGAN) tablet 12.5 mg  12.5 mg Oral Q6H PRN Or  
 ondansetron (ZOFRAN) injection 4 mg  4 mg IntraVENous Q6H PRN  
 glucose chewable tablet 16 g  4 Tab Oral PRN  
 dextrose (D50W) injection syrg 12.5-25 g  25-50 mL IntraVENous PRN  
 glucagon (GLUCAGEN) injection 1 mg  1 mg IntraMUSCular PRN  
 insulin lispro (HUMALOG) injection   SubCUTAneous AC&HS  
 0.9% sodium chloride infusion 250 mL  250 mL IntraVENous PRN Care Plan discussed with: Patient and Nurse Total time spent with patient and review of records: 30 minutes.  
Socorro Garcia MD

## 2020-10-09 NOTE — PROGRESS NOTES
Problem: Mobility Impaired (Adult and Pediatric) Goal: *Acute Goals and Plan of Care (Insert Text) Description: Physical Therapy Goals Revised 10/9/2020 1. Patient will move from supine to sit and sit to supine  in bed with minimal assistance/contact guard assist within 7 day(s). 2.  Patient will transfer from bed to chair and chair to bed with minimal assistance/contact guard assist using the least restrictive device within 7 day(s). 3.  Patient will perform sit to stand with minimal assistance/contact guard assist within 7 day(s). 4.  Patient will ambulate with minimal assistance/contact guard assist for 25 feet with the least restrictive device within 7 day(s). FUNCTIONAL STATUS PRIOR TO ADMISSION: Patient was independent and active without use of DME. 
 
HOME SUPPORT PRIOR TO ADMISSION: The patient lived alone with dtr nearby to provide assistance. Physical Therapy Goals Initiated 10/7/2020 1. Patient will move from supine to sit and sit to supine in bed with minimal assistance/contact guard assist within 7 day(s). 2.  Patient will transfer from bed to chair and chair to bed with minimal assistance/contact guard assist using the least restrictive device within 7 day(s). 3.  Patient will perform sit to stand with minimal assistance/contact guard assist within 7 day(s). 4.  Patient will ambulate with minimal assistance/contact guard assist for 25 feet with the least restrictive device within 7 day(s). 5.  Patient will ascend/descend 4 stairs with 1 handrail(s) with minimal assistance/contact guard assist within 7 day(s). Outcome: Progressing Towards Goal 
 PHYSICAL THERAPY TREATMENT: WEEKLY REASSESSMENT Patient: Neal Dace (32 y.o. female) Date: 10/9/2020 Primary Diagnosis: UTI (urinary tract infection) [N39.0] Precautions:   Fall ASSESSMENT Patient continues with skilled PT services and is progressing towards goals. Pt post op day #1 from Lumbar L3 kyphoplasty. She is received supine in bed with BLE edema and dressing due to cellulitis. Pt stating much improvement with back pain. Rolled with Mod Ax2 with c/o pain today. Max Ax2 with supine to sit with HOB elevated. Sitting on EOB with high guard. Sit to stand with Mod Ax2 with RW. Noted incontinence of stool of which pt stating not aware, yet confirms same PTA. Pt with fair pain tolerance and onset of moderate back pain after 10min sitting on EOB while staff acquiring needed BSC vs amb to bathroom today. Goal for next tx. Noted significant SOB with all tasks while on 3LO2, yet pt 98% sats. Pt with home O2 since Jan 2020. Pt left in NAD with bed in chair position. E ncouraged LAQ's and and ankle pumping Patient's progression toward goals since last assessment: per above Current Level of Function Impacting Discharge (mobility/balance): Mod to Max Ax2/ fair Functional Outcome Measure: The patient scored 30/100 on the barthel outcome measure which is indicative of 60-79% functional impairment. Other factors to consider for discharge: per above; lives alone; need 24hr assist and care PLAN : 
Goals have been updated based on progression since last assessment. Patient continues to benefit from skilled intervention to address the above impairments. Recommendations and Planned Interventions: bed mobility training, transfer training, gait training, therapeutic exercises, neuromuscular re-education, edema management/control, patient and family training/education, and therapeutic activities Frequency/Duration: Patient will be followed by physical therapy:  5 times a week to address goals. Recommendation for discharge: (in order for the patient to meet his/her long term goals) Therapy up to 5 days/week in SNF setting This discharge recommendation: 
Has been made in collaboration with the attending provider and/or case management IF patient discharges home will need the following DME: has all DME SUBJECTIVE:  
Patient stated I am feeling better.  OBJECTIVE DATA SUMMARY:  
HISTORY:   
No past medical history on file. Past Surgical History:  
Procedure Laterality Date  IR KYPHOPLASTY THORACIC   10/8/2020 Personal factors and/or comorbidities impacting plan of care: see above and below Home Situation Home Environment: Private residence One/Two Story Residence: Two story Living Alone: Yes Support Systems: Family member(s) Patient Expects to be Discharged to[de-identified] Rehabilitation facility EXAMINATION/PRESENTATION/DECISION MAKING:  
Critical Behavior: 
Neurologic State: Alert, Eyes open to stimulus, Eyes open to pain, Eyes open to voice, Eyes open spontaneously Orientation Level: Oriented X4 Cognition: Follows commands Safety/Judgement: Awareness of environment Hearing: Auditory Auditory Impairment: Hard of hearing, bilateral 
Hearing Aids/Status: At home Skin:  IV; dsg BLE Edema: BLE's 
Range Of Motion: 
AROM: Generally decreased, functional 
Strength:   
Strength: Generally decreased, functional(chastity BLE) Tone & Sensation:  
Tone: Normal 
Sensation: Intact Coordination: 
Coordination: Generally decreased, functional 
Vision:  
Functional Mobility: 
Bed Mobility: 
Rolling: Moderate assistance;Assist x2 Supine to Sit: Maximum assistance; Total assistance;Assist x2 Sit to Supine: Total assistance;Assist x2 Scooting: Total assistance;Assist x2 Transfers: 
Sit to Stand: Moderate assistance;Assist x2 Stand to Sit: Moderate assistance;Assist x2 Balance:  
Sitting: Impaired Sitting - Static: Good (unsupported) Sitting - Dynamic: Fair (occasional) Standing: Impaired Standing - Static: Fair;Constant support Standing - Dynamic : Poor;Constant support Ambulation/Gait Training: 
Distance (ft): 0 Feet (ft)(unable to ambulate due to fatigue and onset of some back p!) Therapeutic Exercises:  
See above Functional Measure: 
Barthel Index: 
  
Bathin Bladder: 5 Bowels: 5 Groomin Dressin Feeding: 10 Mobility: 0 Stairs: 0 Toilet Use: 5 Transfer (Bed to Chair and Back): 5 Total: 30/100 The Barthel ADL Index: Guidelines 1. The index should be used as a record of what a patient does, not as a record of what a patient could do. 2. The main aim is to establish degree of independence from any help, physical or verbal, however minor and for whatever reason. 3. The need for supervision renders the patient not independent. 4. A patient's performance should be established using the best available evidence. Asking the patient, friends/relatives and nurses are the usual sources, but direct observation and common sense are also important. However direct testing is not needed. 5. Usually the patient's performance over the preceding 24-48 hours is important, but occasionally longer periods will be relevant. 6. Middle categories imply that the patient supplies over 50 per cent of the effort. 7. Use of aids to be independent is allowed. Nay Hoyt., Barthel, D.W. (65). Functional evaluation: the Barthel Index. [address] [facility] (14)2. BEVERLEY GonzalezF, Beto Velasco., Jacoby Anthony., [place], [address] [place] [address] (). Measuring the change indisability after inpatient rehabilitation; comparison of the responsiveness of the Barthel Index and Functional [place] Measure. Journal of Neurology, Neurosurgery, and Psychiatry, 66(4), 395-568. [email or web address] [email or web address], SANIYA Parry, & Deane Saint, M.A. (2004.) Assessment of post-stroke quality of life in cost-effectiveness studies: The usefulness of the Barthel Index and the EuroQoL-5D. [facility], 13, [phone] Pain Rating: 
Improved lbk pain Activity Tolerance:  
Fair Please refer to the flowsheet for vital signs taken during this treatment. After treatment patient left in no apparent distress:  
Supine in bed, Call bell within reach, and Side rails x 3 
  
COMMUNICATION/EDUCATION:  
The patients plan of care was discussed with: Occupational therapist and Registered nurse. Fall prevention education was provided and the patient/caregiver indicated understanding., Patient/family have participated as able in goal setting and plan of care. , and Patient/family agree to work toward stated goals and plan of care. Thank you for this referral. 
Bernabe Alvarez, PT Time Calculation: 38 mins

## 2020-10-09 NOTE — PROGRESS NOTES
Problem: Falls - Risk of 
Goal: *Absence of Falls Description: Document Homero Green Fall Risk and appropriate interventions in the flowsheet. Outcome: Progressing Towards Goal 
Note: Fall Risk Interventions: 
Mobility Interventions: Communicate number of staff needed for ambulation/transfer, Bed/chair exit alarm, Patient to call before getting OOB, OT consult for ADLs, PT Consult for mobility concerns, PT Consult for assist device competence Medication Interventions: Bed/chair exit alarm, Evaluate medications/consider consulting pharmacy, Patient to call before getting OOB, Teach patient to arise slowly, Utilize gait belt for transfers/ambulation Elimination Interventions: Bed/chair exit alarm, Call light in reach, Stay With Me (per policy), Toileting schedule/hourly rounds, Toilet paper/wipes in reach, Patient to call for help with toileting needs History of Falls Interventions: Bed/chair exit alarm, Consult care management for discharge planning, Door open when patient unattended, Evaluate medications/consider consulting pharmacy, Investigate reason for fall, Room close to nurse's station, Utilize gait belt for transfer/ambulation, Vital signs minimum Q4HRs X 24 hrs (comment for end date), Assess for delayed presentation/identification of injury for 48 hrs (comment for end date) Problem: Patient Education: Go to Patient Education Activity Goal: Patient/Family Education Outcome: Progressing Towards Goal 
  
0730-Bedside and Verbal shift change report given to Bharath Chapin RN (oncoming nurse) by Rickie Amezquita RN (offgoing nurse). Report included the following information SBAR, Kardex, Intake/Output and MAR.

## 2020-10-09 NOTE — PROGRESS NOTES
Problem: Self Care Deficits Care Plan (Adult) Goal: *Acute Goals and Plan of Care (Insert Text) Description:  
FUNCTIONAL STATUS PRIOR TO ADMISSION: Patient was modified independent using a rollator for functional mobility. Patient reports short distances only and requires lots of seated breaks on rollator. She states she manages her care independently but admits to difficulty with LB tasks (putting on socks and bathing LB). HOME SUPPORT PRIOR TO ADMISSION: The patient lived alone. Daughter is nearby. Reports she calls for security at [facility] when she needs help. Per pt report her and her daughter are looking into hiring private care aides for when she discharges. Occupational Therapy Goals Re assessment following kyphoplasty- 10/9/2020- goals remain appropriate following procedure. Initiated 10/5/2020 1. Patient will perform grooming with modified independence from unsupported seated position within 7 day(s). 2.  Patient will perform upper body dressing and bathing with modified independence within 7 day(s). 3.  Patient will perform lower body dressing and bathing with moderate assistance using AE PRN within 7 day(s). 4.  Patient will perform toilet transfers to [facility] with moderate assistance  within 7 day(s). 5.  Patient will perform all aspects of toileting with moderate assistance  within 7 day(s). 6.  Patient will participate in upper extremity therapeutic exercise/activities with supervision/set-up for 10 minutes within 7 day(s). 7.  Patient will utilize energy conservation techniques during functional activities with verbal cues within 7 day(s). Outcome: Progressing Towards Goal 
 OCCUPATIONAL THERAPY TREATMENT/WEEKLY RE-EVALUATION Patient: Kary Gonzalez (10 y.o. female) Date: 10/9/2020 Diagnosis: Syncope and collapse [R55] <principal problem not specified> Procedure(s) (LRB): ESOPHAGOGASTRODUODENOSCOPY (EGD) (N/A) COLONOSCOPY (N/A) Precautions: Fall, Back, Skin Chart, occupational therapy assessment, plan of care, and goals were reviewed. ASSESSMENT Based on the objective data described below, patient with progression of activity following kyphoplasty. Patient agreeable to activity, and reports improved back pain. Patient rolling with mod assist x 2. Max x 2 for  supine to sit with HOB elevated for assist.  Patient able to sit at EOB and reports pain at 1/10. She performs sit to stand with mod assist x 2 and RW. She is incontinent of stool and returned to sitting. Patient at EOB due to SOB. With increased time sitting she reports increased back pain and returns to supine with mod assist x 2. Assist with hygiene in supine. Current Level of Function Impacting Discharge (ADLs): mod- max assist 
 
Other factors to consider for discharge: lives alone PLAN : 
Goals have been updated based on progression since last assessment. Patient continues to benefit from skilled intervention to address the above impairments. Continue to follow patient 5 times a week to address goals. Recommend with staff:  
 
Recommend next OT session: continue patient goals Recommendation for discharge: (in order for the patient to meet his/her long term goals) Therapy up to 5 days/week in SNF setting This discharge recommendation: 
Has been made in collaboration with the attending provider and/or case management Equipment recommendations for successful discharge (if) home: TBD SUBJECTIVE:  
Patient stated Rula Pollack stood up! Rosiland Grief OBJECTIVE DATA SUMMARY:  
Cognitive/Behavioral Status: 
Neurologic State: Alert Orientation Level: Oriented X4 Cognition: Appropriate decision making; Follows commands Perception: Appears intact Perseveration: No perseveration noted Safety/Judgement: Awareness of environment Functional Mobility and Transfers for ADLs: 
Bed Mobility: 
Rolling: Moderate assistance Supine to Sit: Maximum assistance;Bed Modified(HOB elevatd ) Sit to Supine: Moderate assistance;Assist x2 Transfers: 
Sit to Stand: Moderate assistance;Assist x2 Balance: 
Sitting: Intact Standing: With support Standing - Static: Constant support; Fair 
Standing - Dynamic : Constant support;Poor; Raghavendra Rubin ADL Intervention: Lower Body Dressing Assistance Socks: Total assistance (dependent) Position Performed: Supine Cues: Physical assistance Toileting Toileting Assistance: Total assistance(dependent) Bowel Hygiene: Total assistance (dependent) Cues: Verbal cues provided Cognitive Retraining Safety/Judgement: Awareness of environment Pain: 
Patient with improved pain, initially stating 1/10 at EOB Activity Tolerance:  
Fair, requires rest breaks, and observed SOB with activity Please refer to the flowsheet for vital signs taken during this treatment. After treatment patient left in no apparent distress:  
Supine in bed, Call bell within reach, Bed / chair alarm activated, and Side rails x 3 
 
COMMUNICATION/COLLABORATION:  
The patients plan of care was discussed with: Physical Therapist and Registered Nurse Rosana Duncan OTR/L Time Calculation: 29 mins

## 2020-10-09 NOTE — PROGRESS NOTES
Bedside shift change report given to Mario Catherine (oncoming nurse) by Farhan Brumfield RN (offgoing nurse). Report included the following information SBAR and Kardex.

## 2020-10-10 NOTE — PROGRESS NOTES
Bedside and Verbal shift change report given to Harrison Jimenez RN (oncoming nurse) by Christo Molina RN (offgoing nurse). Report included the following information SBAR, Kardex, Intake/Output, MAR, Accordion, Recent Results and Med Rec Status.

## 2020-10-10 NOTE — PROGRESS NOTES
[facility] Dosing Services: 10/10/20 Pharmacist Renal Dosing Progress Note for alogliptin Physician Dr. Megan Bertrand The following medication: alogliptin was automatically dose-adjusted per [facility] P&T Committee Protocol, with respect to renal function. Pt Weight:  
Wt Readings from Last 1 Encounters:  
10/09/20 116 kg (255 lb 11.2 oz) Previous Regimen Alogliptin 12.5mg daily Serum Creatinine Lab Results Component Value Date/Time Creatinine 1.00 10/10/2020 04:11 AM  
 Creatinine (POC) 1.1 08/01/2019 11:41 AM  
   
Creatinine Clearance Estimated Creatinine Clearance: 67 mL/min (based on SCr of 1 mg/dL). BUN Lab Results Component Value Date/Time BUN 21 (H) 10/10/2020 04:11 AM  
 BUN (POC) 22 (H) 08/01/2019 11:41 AM  
   
Dosage changed to:  alogliptin 25mg daily, Auto-sub for saxagliptin 5mg daily Pharmacy to continue to monitor patient daily. Will make dosage adjustments based upon changing renal function. Signed Christ Mccormick. Contact information:  [phone]

## 2020-10-10 NOTE — PROGRESS NOTES
Daily Progress Note: 10/10/2020 Dunia Landry PCP: 
Jean Paul Yung MD 
 
Assessment/Plan:  
Acute Syncopal Episode - likely due to low BP from anemia- resolved 
- monitor/tx as needed Hypotension - resolved 
- off pressors 10/2 CAD- stable CHF 
- Diuretics as needed Chronic A-fib with acute exacerbations of RVR 
- Holding Eliquis - Cards consulted - Metoprolol BID Acute on chronic anemia, with hemorrhagic shock 10/1/20- resolved - Transfuse as needed - GI consult - colonoscopy planned at some point - currently on hold - PPI Dehydration 
- IVF 
 
DM2 
- SSI 
-restart onglyza HAYES - CPAP Depression/Anxiety - Remeron and Cymbata Nondisplaced acute fracture L3 superior endplate. 
- kyphoplasty done10/8 by IR 
- Ortho has seen 
-PT Dispo: stable for discharge, awaiting COVID screening for SNF. Problem List: 
Problem List as of 10/10/2020 Date Reviewed: 5/29/2020 Codes Class Noted - Resolved Syncope and collapse ICD-10-CM: R55 
ICD-9-CM: 780.2  9/29/2020 - Present Cellulitis of lower extremity ICD-10-CM: L03.119 ICD-9-CM: 682.6  3/23/2020 - Present ASO (arteriosclerosis obliterans) ICD-10-CM: I70.90 ICD-9-CM: 440.9  9/5/2019 - Present PVD (peripheral vascular disease) ([facility] ICD-10-CM: I73.9 ICD-9-CM: 443.9  8/1/2019 - Present Severe obesity ([facility] ICD-10-CM: E66.01 
ICD-9-CM: 278.01  7/30/2019 - Present UTI (urinary tract infection) ICD-10-CM: N39.0 ICD-9-CM: 599.0  7/13/2019 - Present COPD (chronic obstructive pulmonary disease) (HCC) ICD-10-CM: J44.9 ICD-9-CM: 632  7/13/2019 - Present Sepsis ([facility] ICD-10-CM: A41.9 ICD-9-CM: 038.9, 995.91  7/13/2019 - Present Normocytic anemia ICD-10-CM: D64.9 ICD-9-CM: 285.9  7/13/2019 - Present PAD (peripheral artery disease) (HCC) ICD-10-CM: I73.9 ICD-9-CM: 443.9  7/13/2019 - Present Mild intermittent asthma ICD-10-CM: J45.20 ICD-9-CM: 493.90  5/17/2019 - Present Gangrene of finger of right hand [facility]) ICD-10-CM: A02 
ICD-9-CM: 785.4  5/17/2019 - Present Brachial artery thrombus (HCC) ICD-10-CM: B62.0 ICD-9-CM: 444.21  4/26/2019 - Present Sleep apnea ICD-10-CM: G47.30 ICD-9-CM: 780.57  1/5/2019 - Present Overview Signed 1/5/2019  8:41 PM by Raj Kuo DO  
  wears CPAP Atrial fibrillation [facility]) ICD-10-CM: I48.91 
ICD-9-CM: 427.31  11/16/2018 - Present Obesity (BMI 30-39.9) (Chronic) ICD-10-CM: F47.9 ICD-9-CM: 278.00  11/13/2018 - Present Type 2 diabetes with nephropathy ([facility] ICD-10-CM: E11.21 
ICD-9-CM: 250.40, 583.81  10/1/2018 - Present Recurrent deep vein thrombosis (DVT) (HCC) ICD-10-CM: I82.409 ICD-9-CM: 453.40  3/30/2018 - Present Chronic anticoagulation (Chronic) ICD-10-CM: Z79.01 
ICD-9-CM: V58.61  3/30/2018 - Present Coronary artery disease involving native coronary artery without angina pectoris (Chronic) ICD-10-CM: I25.10 ICD-9-CM: 414.01  1/22/2016 - Present Essential hypertension (Chronic) ICD-10-CM: I10 
ICD-9-CM: 401.9  1/22/2016 - Present CAD (coronary artery disease) ICD-10-CM: I25.10 ICD-9-CM: 414.00  10/9/2015 - Present Type II diabetes mellitus (HCC) (Chronic) ICD-10-CM: E11.9 ICD-9-CM: 250.00  10/9/2015 - Present RESOLVED: Cellulitis ICD-10-CM: L03.90 ICD-9-CM: 682.9  3/23/2020 - 5/29/2020 RESOLVED: Hypokalemia ICD-10-CM: E87.6 ICD-9-CM: 276.8  3/23/2020 - 5/29/2020 RESOLVED: Hyponatremia ICD-10-CM: E87.1 ICD-9-CM: 276.1  3/23/2020 - 5/29/2020 RESOLVED: Diarrhea ICD-10-CM: R19.7 ICD-9-CM: 787.91  3/23/2020 - 5/29/2020 RESOLVED: Syncope and collapse ICD-10-CM: R55 
ICD-9-CM: 780.2  7/13/2019 - 5/29/2020 RESOLVED: Leg wound, left ICD-10-CM: G00.076B ICD-9-CM: 891.0  7/13/2019 - 5/29/2020 RESOLVED: Leg laceration, right, initial encounter ICD-10-CM: H16.977M ICD-9-CM: 894.0  7/13/2019 - 5/29/2020 RESOLVED: Cellulitis of right upper arm ICD-10-CM: L03.113 ICD-9-CM: 682.3  5/17/2019 - 5/29/2020 RESOLVED: Bowel obstruction ([facility] ICD-10-CM: A77.593 ICD-9-CM: 560.9  1/8/2019 - 5/29/2020 RESOLVED: Partial small bowel obstruction ([facility] ICD-10-CM: K56.600 ICD-9-CM: 560.9  1/5/2019 - 5/29/2020 RESOLVED: Dyspnea ICD-10-CM: R06.00 
ICD-9-CM: 786.09  11/13/2018 - 5/29/2020 RESOLVED: ARF (acute renal failure) ([facility]) ICD-10-CM: N17.9 ICD-9-CM: 584.9  11/13/2018 - 5/29/2020 RESOLVED: Closed wedge compression fracture of T7 vertebra ([facility] ICD-10-CM: V20.652W ICD-9-CM: 805.2  11/13/2018 - 5/29/2020 RESOLVED: Chest pain ICD-10-CM: R07.9 ICD-9-CM: 786.50  6/27/2018 - 5/29/2020 RESOLVED: Abdominal pain ICD-10-CM: R10.9 ICD-9-CM: 789.00  6/27/2018 - 5/29/2020 RESOLVED: HTN (hypertension) ICD-10-CM: I10 
ICD-9-CM: 401.9  10/9/2015 - 1/22/2016 RESOLVED: NSTEMI (non-ST elevated myocardial infarction) ([facility] ICD-10-CM: I21.4 ICD-9-CM: 410.70  10/9/2015 - 5/29/2020 HPI:  
 72-year-old female with multiple medical problems including obesity, sleep apnea, non-ST-elevation myocardial infarction, primary hypertension, congestive heart failure, gastroesophageal reflux disease, type 2 diabetes mellitus, temporal arteritis, fibromyalgia, atrial fibrillation and asthma, was brought to the emergency room from home for complaints of lightheadedness, dizziness with subsequent fall and low back pain. Patient stated that whilst preparing some dinner in the evening hours of 09/29/2020, had the onset of symptoms with subsequent fall in a sitting position. Thereafter, patient noted persistent low back pain. Patient denied any recent changes in medication.   Denied headaches, visual deficits, chest pain, palpitations, sore throat, cough, shortness of breath, nausea, vomiting, fevers, chills, abdominal pain, bladder or bowel irregularities. (Dr Kranthi Balderas) 9/30: She is c/o pain in her back. Meds are helping. Hb 5.1 and she has been transfused with 2 units. AM labs pending and she will need a line as she is a difficult stick. May need more blood. She reports her stools have been dark so will ask GI to see. Holding Eliquis. She is on chronic steroids due to TA.  
 
10/1:  Heart rate up to 180s this AM - seems to have spiked up due to back pain but now back in 110s. No other complaint except back pain. No chest pain or SOB. Hb 7.1 this AM - watching. GI planning colonoscopy - starting prep. Poss kyphoplasty when more stable. 10/2:  Reports she feels \"just bad all over\" this AM.  Last night became hypotensive, dropped Hb again, required another transfusion of 1 unit, and Roland had to be started to maintain BP - remains on Roland this AM.  Also mult episodes of RVR of her A fib - Cards treating and Dilt drip started. Transferred to ICU. Hb up to 8.8 this AM.  Tmax 99. WBC up a little from previous -watching. 10/3:  Feeling much better this AM and sitting up eating breakfast.  Back in sinus rhythm with rate in 80s at this time. BP better. Off pressors. Off IV dilt today and on BB. Hb 8.0. Hopefully out of ICU later today or tomorrow. 10/4:  Continues to feel better. BP ok. Out of ICU today. 10/5:  Little change from yesterday. No new complaints. Still c/o back pain with movement. She may be stable enough for the GI work up and for Kyphoplasty - per GI and IR. Hb 8.0. 
 
10/6:  AM labs pending. Still c/o pain. Holding anticoag. More fatigued. 10/7:  Still c/o back pain with movement. She would like to have the kyphoplasty done prior to going to rehab. Anticoags have been held and Hb has been stable last few days so perhaps IR can do the kyphoplasty - will consult them. Covid testing for SNF being done. 10/8:  Little change except in better spirits today.   Kyphoplasty planned for today. Covid testing for SNF pending as of this AM.   
 
10/9:  Marked improvement in back pain with Kyphoplasty done yesterday. No back pain at all this AM so far. She wants to do more with PT/OT today. 2nd Covid negative. Stable for rehab.   
 
10/10:  Pt feels tired today. [facility] has accepted her, but her COIVD tests are too old. Needs 2 more. Tolerating PO, +BM yesterday. Wants to get up with therapy again today. Back pain continues to be improved. Review of Systems: A comprehensive review of systems was negative except for that written in the HPI. Objective:  
Physical Exam:  
Visit Vitals /82 (BP 1 Location: Left arm, BP Patient Position: At rest) Pulse 95 Temp 97.9 °F (36.6 °C) Resp 18 Ht 5' 7\" (1.702 m) Wt 116 kg (255 lb 11.2 oz) SpO2 95% BMI 40.05 kg/m² O2 Flow Rate (L/min): 3 l/min O2 Device: Nasal cannula Temp (24hrs), Av.1 °F (36.7 °C), Min:97.8 °F (36.6 °C), Max:98.3 °F (36.8 °C) No intake/output data recorded. 10/08 1901 - 10/10 0700 In: 840 [P.O.:840] Out: 300 [Urine:300] General:  Alert, cooperative, no distress, obese Head:  Normocephalic, without obvious abnormality, atraumatic. Eyes:  Conjunctivae/corneas clear. PERRL, EOMs intact. Throat: Lips, mucosa, and tongue normal. Teeth and gums normal.  
Neck: Supple, symmetrical, trachea midline, no adenopathy, thyroid: no enlargement/tenderness/nodules, no carotid bruit and no JVD. Lungs:   Clear to auscultation bilaterally. Chest wall:  No tenderness or deformity. Heart:  Currently in sinus rhythm with rate ok, no murmur, click, rub or gallop. Abdomen:   Soft, non-tender. Bowel sounds normal. No masses,  No organomegaly. Extremities: Extremities with weeping and swelling, mildly erythematous, dressed. Less edema. No calf tenderness or cords. Multiple amputated fingers Pulses: 1+ and symmetric all extremities.   
Skin: Skin color, texture, turgor normal.   
 Neurologic:   Alert and oriented X 3. Fine motor of hands and fingers normal.   equal.  Gait not tested at this time. Sensation grossly normal to touch. Gross motor of extremities normal.    
 
Data Review:  
CT Head IMPRESSION: No acute findings. EXAM:  CT CERVICAL SPINE WITHOUT CONTRAST INDICATION: fall w head injury. COMPARISON: None. CONTRAST:  None. FINDINGS: 
Alignment is satisfactory, no acute fracture or dislocation. Moderate spondylosis and disc degeneration at C5-6 IMPRESSION 
 impression: No acute changes. CT LS spine Axial CT scan of the lumbar sacral spine obtained without contrast with coronal 
and sagittal reconstructions. No prior. CT dose reduction was achieved through 
the use of a standardized protocol tailored for this examination and automatic 
exposure control for dose modulation . Collette Ruts There is a minimally displaced fracture of the superior endplate of L3 without 
significant canal compromise. There is some mild diffuse spondylosis and disc degeneration. IMPRESSION 
 impression: Nondisplaced acute fracture L3 superior endplate. Kyphoplasty L3 10/8/20 Procedure: 1. Fluoroscopy guided placement of cannulas into the bilateral L3 pedicles. 2. Balloon kyphoplasty of the L3 vertebral body with cement augmentation. 3. Sedation provided by the anesthesia department. FLUOROSCOPY TIME: 8.0 min FLUOROSCOPY DOSE: 1382 mGy Body: 
Following discussion of risks, benefits and alternatives, informed written 
consent was obtained. The patient was placed prone on the fluoroscopic exam table and prepped and 
draped in sterile fashion. The correct level was identified fluoroscopically. 1% lidocaine was administered to the subcutaneous tissues for local anesthesia, 
and 0.25% bupivacaine was administered into the periosteum of of the bilateral 
pedicles under fluoroscopic guidance using a 22-gauge 10 cm spinal needle for local anesthesia. Under biplane fluoroscopic guidance, the left L3 pedicle was 
traversed with a Kyphon cannula. The tip of the cannula was placed just within 
the vertebral body. A drill was then advanced to within the anterior portion of 
the vertebral body. This procedure was repeated on the right L3 pedicle. Into each cannula, a kyphoplasty balloon was inserted. Both balloons were 
inflated with to a pressure of approximately 200 PSI towards the anterior part 
of the vertebral body. The balloons were deflated and retracted slightly, just 
underneath the deformed portion of the superior endplate and reinflated slowly. There is slight reduction of the deformity. The right balloon was then removed, 
the left balloons remained in place and inflated while cement was injected into 
the right cannula under live fluoroscopic examination. The left-sided balloon 
was then deflated and removed, while the cement was injected into the left 
cannula under live fluoroscopic guidance. The devices were removed and a 
dressing was applied. There were no immediate complications. There was a small 
amount of extravasation of cement into some small paravertebral venous branches, 
however this was self-limited and there was no distal cement embolization. A 
small amount cement was also noted under live fluoroscopy examination to track 
along the right pedicle access tract. Impression: Kyphoplasty of the L3 vertebral body. Recent Days: 
Recent Labs 10/10/20 
0411 10/09/20 
0606 10/08/20 
8709 WBC 11.2* 10.5 11.0 HGB 8.8* 8.3* 8.7* HCT 32.0* 30.1* 31.6*  
 260 238 Recent Labs 10/10/20 
0411 10/09/20 
0606 10/08/20 
0250  145 144  
K 4.2 3.5 3.9  109* 109* CO2 30 33* 33* * 166* 174* BUN 21* 23* 30* CREA 1.00 1.00 1. 15* CA 7.4* 7.5* 7.8* ALB 2.1* 2.2* 2.1* TBILI 0.8 0.6 0.9 ALT 23 24 46  
24 Hour Results: 
Recent Results (from the past 24 hour(s)) GLUCOSE, POC  
 Collection Time: 10/09/20 11:06 AM  
Result Value Ref Range Glucose (POC) 203 (H) 65 - 100 mg/dL Performed by Julio Cesar Gomez ([facility]) GLUCOSE, POC Collection Time: 10/09/20  4:26 PM  
Result Value Ref Range Glucose (POC) 160 (H) 65 - 100 mg/dL Performed by Julio Cesar Gomez ([facility]) GLUCOSE, POC Collection Time: 10/09/20  9:24 PM  
Result Value Ref Range Glucose (POC) 195 (H) 65 - 100 mg/dL Performed by Theodore Sanchez   
CBC WITH AUTOMATED DIFF Collection Time: 10/10/20  4:11 AM  
Result Value Ref Range WBC 11.2 (H) 3.6 - 11.0 K/uL  
 RBC 3.50 (L) 3.80 - 5.20 M/uL HGB 8.8 (L) 11.5 - 16.0 g/dL HCT 32.0 (L) 35.0 - 47.0 % MCV 91.4 80.0 - 99.0 FL  
 MCH 25.1 (L) 26.0 - 34.0 PG  
 MCHC 27.5 (L) 30.0 - 36.5 g/dL RDW 23.3 (H) 11.5 - 14.5 % PLATELET 119 026 - 327 K/uL MPV 11.2 8.9 - 12.9 FL  
 NRBC 0.0 0  WBC ABSOLUTE NRBC 0.00 0.00 - 0.01 K/uL NEUTROPHILS 84 (H) 32 - 75 % LYMPHOCYTES 8 (L) 12 - 49 % MONOCYTES 6 5 - 13 % EOSINOPHILS 1 0 - 7 % BASOPHILS 0 0 - 1 % IMMATURE GRANULOCYTES 1 (H) 0.0 - 0.5 % ABS. NEUTROPHILS 9.4 (H) 1.8 - 8.0 K/UL  
 ABS. LYMPHOCYTES 0.9 0.8 - 3.5 K/UL  
 ABS. MONOCYTES 0.7 0.0 - 1.0 K/UL  
 ABS. EOSINOPHILS 0.1 0.0 - 0.4 K/UL  
 ABS. BASOPHILS 0.0 0.0 - 0.1 K/UL  
 ABS. IMM. GRANS. 0.1 (H) 0.00 - 0.04 K/UL  
 DF SMEAR SCANNED    
 RBC COMMENTS ANISOCYTOSIS 2+ 
    
 RBC COMMENTS HYPOCHROMIA 1+ METABOLIC PANEL, COMPREHENSIVE Collection Time: 10/10/20  4:11 AM  
Result Value Ref Range Sodium 141 136 - 145 mmol/L Potassium 4.2 3.5 - 5.1 mmol/L Chloride 108 97 - 108 mmol/L  
 CO2 30 21 - 32 mmol/L Anion gap 3 (L) 5 - 15 mmol/L Glucose 204 (H) 65 - 100 mg/dL BUN 21 (H) 6 - 20 MG/DL Creatinine 1.00 0.55 - 1.02 MG/DL  
 BUN/Creatinine ratio 21 (H) 12 - 20 GFR est AA >60 >60 ml/min/1.73m2 GFR est non-AA 55 (L) >60 ml/min/1.73m2  Calcium 7.4 (L) 8.5 - 10.1 MG/DL  
 Bilirubin, total 0.8 0.2 - 1.0 MG/DL  
 ALT (SGPT) 23 12 - 78 U/L  
 AST (SGOT) 60 (H) 15 - 37 U/L Alk. phosphatase 263 (H) 45 - 117 U/L Protein, total 5.2 (L) 6.4 - 8.2 g/dL Albumin 2.1 (L) 3.5 - 5.0 g/dL Globulin 3.1 2.0 - 4.0 g/dL A-G Ratio 0.7 (L) 1.1 - 2.2 GLUCOSE, POC Collection Time: 10/10/20  6:41 AM  
Result Value Ref Range Glucose (POC) 178 (H) 65 - 100 mg/dL Performed by James Jorge Medications reviewed Current Facility-Administered Medications Medication Dose Route Frequency  predniSONE (DELTASONE) tablet 10 mg  10 mg Oral BID WITH MEALS  
 ipratropium (ATROVENT) 0.02 % nebulizer solution 0.5 mg  0.5 mg Nebulization Q6HWA RT  
 HYDROmorphone (DILAUDID) syringe 0.5-1 mg  0.5-1 mg IntraVENous Multiple  ondansetron (ZOFRAN) injection 4 mg  4 mg IntraVENous PRN  
 cyanocobalamin (VITAMIN B12) tablet 1,000 mcg  1,000 mcg Oral DAILY  ferrous sulfate tablet 325 mg  1 Tab Oral DAILY WITH BREAKFAST  metoprolol tartrate (LOPRESSOR) tablet 25 mg  25 mg Oral Q12H  pantoprazole (PROTONIX) tablet 40 mg  40 mg Oral ACB&D  
 0.9% sodium chloride infusion 250 mL  250 mL IntraVENous PRN  
 arformoteroL (BROVANA) neb solution 15 mcg  15 mcg Nebulization BID RT  
 budesonide (PULMICORT) 500 mcg/2 ml nebulizer suspension  500 mcg Nebulization BID RT  
 digoxin (LANOXIN) tablet 0.125 mg  0.125 mg Oral DAILY  influenza vaccine 2020-21 (6 mos+)(PF) (FLUARIX/FLULAVAL/FLUZONE QUAD) injection 0.5 mL  0.5 mL IntraMUSCular PRIOR TO DISCHARGE  metoprolol tartrate (LOPRESSOR) tablet 25 mg  25 mg Oral DAILY PRN  
 mirtazapine (REMERON) tablet 15 mg  15 mg Oral QHS  DULoxetine (CYMBALTA) capsule 60 mg  60 mg Oral DAILY  montelukast (SINGULAIR) tablet 10 mg  10 mg Oral DAILY  HYDROcodone-acetaminophen (NORCO) 5-325 mg per tablet 1 Tab  1 Tab Oral Q4H PRN  
 [Held by provider] furosemide (LASIX) tablet 20 mg  20 mg Oral DAILY  morphine injection 1 mg  1 mg IntraVENous Q6H PRN  
 0.9% sodium chloride infusion 250 mL  250 mL IntraVENous PRN  
 sodium chloride (NS) flush 5-40 mL  5-40 mL IntraVENous Q8H  
 sodium chloride (NS) flush 5-40 mL  5-40 mL IntraVENous PRN  
 acetaminophen (TYLENOL) tablet 650 mg  650 mg Oral Q6H PRN Or  
 acetaminophen (TYLENOL) suppository 650 mg  650 mg Rectal Q6H PRN  polyethylene glycol (MIRALAX) packet 17 g  17 g Oral DAILY PRN  promethazine (PHENERGAN) tablet 12.5 mg  12.5 mg Oral Q6H PRN Or  
 ondansetron (ZOFRAN) injection 4 mg  4 mg IntraVENous Q6H PRN  
 glucose chewable tablet 16 g  4 Tab Oral PRN  
 dextrose (D50W) injection syrg 12.5-25 g  25-50 mL IntraVENous PRN  
 glucagon (GLUCAGEN) injection 1 mg  1 mg IntraMUSCular PRN  
 insulin lispro (HUMALOG) injection   SubCUTAneous AC&HS  
 0.9% sodium chloride infusion 250 mL  250 mL IntraVENous PRN Care Plan discussed with: Patient and Nurse Total time spent with patient and review of records: 30 minutes.  
Maggy Kauffman MD

## 2020-10-10 NOTE — ROUTINE PROCESS
Bedside shift change report given to Julio César Webster RN (oncoming nurse) by Shonda Ayon RN (offgoing nurse). Report included the following information SBAR, Kardex and MAR.

## 2020-10-10 NOTE — PROGRESS NOTES
Bedside and Verbal shift change report given to Megan Rivera RN (oncoming nurse) by Ellouise Kawasaki, RN (offgoing nurse). Report included the following information SBAR, Kardex, Intake/Output, MAR, Accordion and Recent Results.

## 2020-10-11 NOTE — PROGRESS NOTES
Bedside and Verbal shift change report given to Beth Bowen RN (oncoming nurse) by Dahlia Guan RN (offgoing nurse). Report included the following information SBAR, Kardex, Intake/Output, MAR, Accordion and Recent Results.

## 2020-10-11 NOTE — PROGRESS NOTES
Daily Progress Note: 10/11/2020 Atha Citizen PCP: 
Kranthi Sierra MD 
 
Assessment/Plan:  
Acute Syncopal Episode - likely due to low BP from anemia- resolved 
- monitor/tx as needed Hypotension - resolved 
- off pressors 10/2 CAD- stable CHF 
- Diuretics as needed Chronic A-fib with acute exacerbations of RVR 
- Holding Eliquis - Cards consulted - Metoprolol BID Acute on chronic anemia, with hemorrhagic shock 10/1/20- resolved - Transfuse as needed - GI consult - colonoscopy planned at some point - currently on hold - PPI Leukocytosis: ddx chronic leg wounds, atelectasis 
- add keflex 
- encouraged IS 
 
DM2- trend sugars 
- SSI 
- onglyza HAYES - CPAP Depression/Anxiety - Remeron and Cymbata Nondisplaced acute fracture L3 superior endplate. 
- kyphoplasty done10/8 by IR 
- Ortho has seen 
-PT Dispo: stable for discharge, awaiting COVID screening for SNF. Problem List: 
Problem List as of 10/11/2020 Date Reviewed: 5/29/2020 Codes Class Noted - Resolved Syncope and collapse ICD-10-CM: R55 
ICD-9-CM: 780.2  9/29/2020 - Present Cellulitis of lower extremity ICD-10-CM: L03.119 ICD-9-CM: 682.6  3/23/2020 - Present ASO (arteriosclerosis obliterans) ICD-10-CM: I70.90 ICD-9-CM: 440.9  9/5/2019 - Present PVD (peripheral vascular disease) ([facility] ICD-10-CM: I73.9 ICD-9-CM: 443.9  8/1/2019 - Present Severe obesity ([facility] ICD-10-CM: E66.01 
ICD-9-CM: 278.01  7/30/2019 - Present UTI (urinary tract infection) ICD-10-CM: N39.0 ICD-9-CM: 599.0  7/13/2019 - Present COPD (chronic obstructive pulmonary disease) (HCC) ICD-10-CM: J44.9 ICD-9-CM: 164  7/13/2019 - Present Sepsis ([facility].) ICD-10-CM: A41.9 ICD-9-CM: 038.9, 995.91  7/13/2019 - Present Normocytic anemia ICD-10-CM: D64.9 ICD-9-CM: 285.9  7/13/2019 - Present PAD (peripheral artery disease) (HCC) ICD-10-CM: I73.9 ICD-9-CM: 443.9  7/13/2019 - Present Mild intermittent asthma ICD-10-CM: J45.20 ICD-9-CM: 493.90  5/17/2019 - Present Gangrene of finger of right hand [facility]) ICD-10-CM: M13 
ICD-9-CM: 785.4  5/17/2019 - Present Brachial artery thrombus (HCC) ICD-10-CM: P15.3 ICD-9-CM: 444.21  4/26/2019 - Present Sleep apnea ICD-10-CM: G47.30 ICD-9-CM: 780.57  1/5/2019 - Present Overview Signed 1/5/2019  8:41 PM by Luis F Pelaez,   
  wears CPAP Atrial fibrillation [facility]) ICD-10-CM: I48.91 
ICD-9-CM: 427.31  11/16/2018 - Present Obesity (BMI 30-39.9) (Chronic) ICD-10-CM: R51.0 ICD-9-CM: 278.00  11/13/2018 - Present Type 2 diabetes with nephropathy ([facility] ICD-10-CM: E11.21 
ICD-9-CM: 250.40, 583.81  10/1/2018 - Present Recurrent deep vein thrombosis (DVT) (HCC) ICD-10-CM: I82.409 ICD-9-CM: 453.40  3/30/2018 - Present Chronic anticoagulation (Chronic) ICD-10-CM: Z79.01 
ICD-9-CM: V58.61  3/30/2018 - Present Coronary artery disease involving native coronary artery without angina pectoris (Chronic) ICD-10-CM: I25.10 ICD-9-CM: 414.01  1/22/2016 - Present Essential hypertension (Chronic) ICD-10-CM: I10 
ICD-9-CM: 401.9  1/22/2016 - Present CAD (coronary artery disease) ICD-10-CM: I25.10 ICD-9-CM: 414.00  10/9/2015 - Present Type II diabetes mellitus (HCC) (Chronic) ICD-10-CM: E11.9 ICD-9-CM: 250.00  10/9/2015 - Present RESOLVED: Cellulitis ICD-10-CM: L03.90 ICD-9-CM: 682.9  3/23/2020 - 5/29/2020 RESOLVED: Hypokalemia ICD-10-CM: E87.6 ICD-9-CM: 276.8  3/23/2020 - 5/29/2020 RESOLVED: Hyponatremia ICD-10-CM: E87.1 ICD-9-CM: 276.1  3/23/2020 - 5/29/2020 RESOLVED: Diarrhea ICD-10-CM: R19.7 ICD-9-CM: 787.91  3/23/2020 - 5/29/2020 RESOLVED: Syncope and collapse ICD-10-CM: R55 
ICD-9-CM: 780.2  7/13/2019 - 5/29/2020 RESOLVED: Leg wound, left ICD-10-CM: L48.257H ICD-9-CM: 891.0  7/13/2019 - 5/29/2020 RESOLVED: Leg laceration, right, initial encounter ICD-10-CM: W04.012R ICD-9-CM: 894.0  7/13/2019 - 5/29/2020 RESOLVED: Cellulitis of right upper arm ICD-10-CM: L03.113 ICD-9-CM: 682.3  5/17/2019 - 5/29/2020 RESOLVED: Bowel obstruction ([facility] ICD-10-CM: H72.235 ICD-9-CM: 560.9  1/8/2019 - 5/29/2020 RESOLVED: Partial small bowel obstruction ([facility].) ICD-10-CM: K56.600 ICD-9-CM: 560.9  1/5/2019 - 5/29/2020 RESOLVED: Dyspnea ICD-10-CM: R06.00 
ICD-9-CM: 786.09  11/13/2018 - 5/29/2020 RESOLVED: ARF (acute renal failure) (HCC) ICD-10-CM: N17.9 ICD-9-CM: 584.9  11/13/2018 - 5/29/2020 RESOLVED: Closed wedge compression fracture of T7 vertebra ([facility].) ICD-10-CM: J38.119S ICD-9-CM: 805.2  11/13/2018 - 5/29/2020 RESOLVED: Chest pain ICD-10-CM: R07.9 ICD-9-CM: 786.50  6/27/2018 - 5/29/2020 RESOLVED: Abdominal pain ICD-10-CM: R10.9 ICD-9-CM: 789.00  6/27/2018 - 5/29/2020 RESOLVED: HTN (hypertension) ICD-10-CM: I10 
ICD-9-CM: 401.9  10/9/2015 - 1/22/2016 RESOLVED: NSTEMI (non-ST elevated myocardial infarction) ([facility] ICD-10-CM: I21.4 ICD-9-CM: 410.70  10/9/2015 - 5/29/2020 HPI:  
 62-year-old female with multiple medical problems including obesity, sleep apnea, non-ST-elevation myocardial infarction, primary hypertension, congestive heart failure, gastroesophageal reflux disease, type 2 diabetes mellitus, temporal arteritis, fibromyalgia, atrial fibrillation and asthma, was brought to the emergency room from home for complaints of lightheadedness, dizziness with subsequent fall and low back pain. Patient stated that whilst preparing some dinner in the evening hours of 09/29/2020, had the onset of symptoms with subsequent fall in a sitting position. Thereafter, patient noted persistent low back pain. Patient denied any recent changes in medication.   Denied headaches, visual deficits, chest pain, palpitations, sore throat, cough, shortness of breath, nausea, vomiting, fevers, chills, abdominal pain, bladder or bowel irregularities. (Dr Iglesia Galeas) 9/30: She is c/o pain in her back. Meds are helping. Hb 5.1 and she has been transfused with 2 units. AM labs pending and she will need a line as she is a difficult stick. May need more blood. She reports her stools have been dark so will ask GI to see. Holding Eliquis. She is on chronic steroids due to TA.  
 
10/1:  Heart rate up to 180s this AM - seems to have spiked up due to back pain but now back in 110s. No other complaint except back pain. No chest pain or SOB. Hb 7.1 this AM - watching. GI planning colonoscopy - starting prep. Poss kyphoplasty when more stable. 10/2:  Reports she feels \"just bad all over\" this AM.  Last night became hypotensive, dropped Hb again, required another transfusion of 1 unit, and Roland had to be started to maintain BP - remains on Roland this AM.  Also mult episodes of RVR of her A fib - Cards treating and Dilt drip started. Transferred to ICU. Hb up to 8.8 this AM.  Tmax 99. WBC up a little from previous -watching. 10/3:  Feeling much better this AM and sitting up eating breakfast.  Back in sinus rhythm with rate in 80s at this time. BP better. Off pressors. Off IV dilt today and on BB. Hb 8.0. Hopefully out of ICU later today or tomorrow. 10/4:  Continues to feel better. BP ok. Out of ICU today. 10/5:  Little change from yesterday. No new complaints. Still c/o back pain with movement. She may be stable enough for the GI work up and for Kyphoplasty - per GI and IR. Hb 8.0. 
 
10/6:  AM labs pending. Still c/o pain. Holding anticoag. More fatigued. 10/7:  Still c/o back pain with movement. She would like to have the kyphoplasty done prior to going to rehab.   Anticoags have been held and Hb has been stable last few days so perhaps IR can do the kyphoplasty - will consult them. Covid testing for SNF being done. 10/8:  Little change except in better spirits today. Kyphoplasty planned for today. Covid testing for SNF pending as of this AM.   
 
10/9:  Marked improvement in back pain with Kyphoplasty done yesterday. No back pain at all this AM so far. She wants to do more with PT/OT today. 2nd Covid negative. Stable for rehab.   
 
10/10:  Pt feels tired today. [facility] has accepted her, but her COIVD tests are too old. Needs 2 more. Tolerating PO, +BM yesterday. Wants to get up with therapy again today. Back pain continues to be improved. 10/11:  Sugars are borderline, but just restarted onglyza yesterday. WBC up, afebrile. Denies cough, dysuria, increased pain in legs. Ddx for this is either leg wounds or atelectasis- encouraged IS. Tolerating PO.  +BM. Lois Rudolph to go to SNF when available. Review of Systems: A comprehensive review of systems was negative except for that written in the HPI. Objective:  
Physical Exam:  
Visit Vitals /76 (BP 1 Location: Left arm, BP Patient Position: At rest) Pulse 94 Temp 98.4 °F (36.9 °C) Resp 16 Ht 5' 7\" (1.702 m) Wt 116 kg (255 lb 11.2 oz) SpO2 94% BMI 40.05 kg/m² O2 Flow Rate (L/min): 3 l/min O2 Device: Nasal cannula Temp (24hrs), Av.3 °F (36.8 °C), Min:97.9 °F (36.6 °C), Max:98.5 °F (36.9 °C) No intake/output data recorded. 10/09 1901 - 10/11 0700 In: 360 [P.O.:360] Out: 500 [Urine:500] General:  Alert, cooperative, no distress, obese Head:  Normocephalic, without obvious abnormality, atraumatic. Eyes:  Conjunctivae/corneas clear. PERRL, EOMs intact. Throat: Lips, mucosa, and tongue normal. Teeth and gums normal.  
Neck: Supple, symmetrical, trachea midline, no adenopathy, thyroid: no enlargement/tenderness/nodules, no carotid bruit and no JVD. Lungs:   Clear to auscultation bilaterally. Chest wall:  No tenderness or deformity. Heart:  Currently in sinus rhythm with rate ok, no murmur, click, rub or gallop. Abdomen:   Soft, non-tender. Bowel sounds normal. No masses,  No organomegaly. Extremities: Extremities with weeping and swelling, erythematous, dressed. Less edema. No calf tenderness or cords. Multiple partially amputated fingers R hand Pulses: 1+ and symmetric all extremities. Skin: Skin color, texture, turgor normal.   
Neurologic:   Alert and oriented X 3. Fine motor of hands and fingers normal.   equal.  Gait not tested at this time. Sensation grossly normal to touch. Gross motor of extremities normal.    
 
Data Review:  
CT Head IMPRESSION: No acute findings. EXAM:  CT CERVICAL SPINE WITHOUT CONTRAST INDICATION: fall w head injury. COMPARISON: None. CONTRAST:  None. FINDINGS: 
Alignment is satisfactory, no acute fracture or dislocation. Moderate spondylosis and disc degeneration at C5-6 IMPRESSION 
 impression: No acute changes. CT LS spine Axial CT scan of the lumbar sacral spine obtained without contrast with coronal 
and sagittal reconstructions. No prior. CT dose reduction was achieved through 
the use of a standardized protocol tailored for this examination and automatic 
exposure control for dose modulation . Carolyne Sleet There is a minimally displaced fracture of the superior endplate of L3 without 
significant canal compromise. There is some mild diffuse spondylosis and disc degeneration. IMPRESSION 
 impression: Nondisplaced acute fracture L3 superior endplate. Kyphoplasty L3 10/8/20 Procedure: 1. Fluoroscopy guided placement of cannulas into the bilateral L3 pedicles. 2. Balloon kyphoplasty of the L3 vertebral body with cement augmentation. 3. Sedation provided by the anesthesia department. FLUOROSCOPY TIME: 8.0 min FLUOROSCOPY DOSE: 1382 mGy Body: 
Following discussion of risks, benefits and alternatives, informed written 
consent was obtained. The patient was placed prone on the fluoroscopic exam table and prepped and 
draped in sterile fashion. The correct level was identified fluoroscopically. 1% lidocaine was administered to the subcutaneous tissues for local anesthesia, 
and 0.25% bupivacaine was administered into the periosteum of of the bilateral 
pedicles under fluoroscopic guidance using a 22-gauge 10 cm spinal needle for 
local anesthesia. Under biplane fluoroscopic guidance, the left L3 pedicle was 
traversed with a Kyphon cannula. The tip of the cannula was placed just within 
the vertebral body. A drill was then advanced to within the anterior portion of 
the vertebral body. This procedure was repeated on the right L3 pedicle. Into each cannula, a kyphoplasty balloon was inserted. Both balloons were 
inflated with to a pressure of approximately 200 PSI towards the anterior part 
of the vertebral body. The balloons were deflated and retracted slightly, just 
underneath the deformed portion of the superior endplate and reinflated slowly. There is slight reduction of the deformity. The right balloon was then removed, 
the left balloons remained in place and inflated while cement was injected into 
the right cannula under live fluoroscopic examination. The left-sided balloon 
was then deflated and removed, while the cement was injected into the left 
cannula under live fluoroscopic guidance. The devices were removed and a 
dressing was applied. There were no immediate complications. There was a small 
amount of extravasation of cement into some small paravertebral venous branches, 
however this was self-limited and there was no distal cement embolization. A 
small amount cement was also noted under live fluoroscopy examination to track 
along the right pedicle access tract. Impression: Kyphoplasty of the L3 vertebral body. Recent Days: 
Recent Labs 10/11/20 
0463 10/10/20 
0411 10/09/20 
8981 WBC 13.3* 11.2* 10.5 HGB 8.6* 8.8* 8.3* HCT 31.1* 32.0* 30.1*  
 308 260 Recent Labs 10/11/20 
6920 10/10/20 
0411 10/09/20 
8320  141 145  
K 3.9 4.2 3.5  108 109* CO2 29 30 33* * 204* 166* BUN 19 21* 23* CREA 0.90 1.00 1.00  
CA 7.7* 7.4* 7.5* ALB 2.1* 2.1* 2.2* TBILI 0.8 0.8 0.6 ALT 21 23 24  
24 Hour Results: 
Recent Results (from the past 24 hour(s)) GLUCOSE, POC Collection Time: 10/10/20 11:23 AM  
Result Value Ref Range Glucose (POC) 129 (H) 65 - 100 mg/dL Performed by Julio Cesar Gomez (PCT) SARS-COV-2 Collection Time: 10/10/20  2:29 PM  
Result Value Ref Range Specimen source Nasopharyngeal    
 SARS-CoV-2 PENDING   
 SARS-CoV-2 PENDING Specimen source Nasopharyngeal    
 COVID-19 rapid test PENDING Specimen type NP Swab Health status PENDING   
 COVID-19 PENDING   
GLUCOSE, POC Collection Time: 10/10/20  4:05 PM  
Result Value Ref Range Glucose (POC) 111 (H) 65 - 100 mg/dL Performed by Julio Cesar Gomez (PCT) GLUCOSE, POC Collection Time: 10/10/20  8:58 PM  
Result Value Ref Range Glucose (POC) 147 (H) 65 - 100 mg/dL Performed by Suzanna Aguilar (CON) CBC WITH AUTOMATED DIFF Collection Time: 10/11/20  3:49 AM  
Result Value Ref Range WBC 13.3 (H) 3.6 - 11.0 K/uL  
 RBC 3.41 (L) 3.80 - 5.20 M/uL HGB 8.6 (L) 11.5 - 16.0 g/dL HCT 31.1 (L) 35.0 - 47.0 % MCV 91.2 80.0 - 99.0 FL  
 MCH 25.2 (L) 26.0 - 34.0 PG  
 MCHC 27.7 (L) 30.0 - 36.5 g/dL RDW 23.2 (H) 11.5 - 14.5 % PLATELET 676 112 - 392 K/uL MPV 10.4 8.9 - 12.9 FL  
 NRBC 0.0 0  WBC ABSOLUTE NRBC 0.00 0.00 - 0.01 K/uL NEUTROPHILS 84 (H) 32 - 75 % LYMPHOCYTES 8 (L) 12 - 49 % MONOCYTES 6 5 - 13 % EOSINOPHILS 1 0 - 7 % BASOPHILS 0 0 - 1 % IMMATURE GRANULOCYTES 1 (H) 0.0 - 0.5 % ABS. NEUTROPHILS 11.2 (H) 1.8 - 8.0 K/UL  
 ABS. LYMPHOCYTES 1.1 0.8 - 3.5 K/UL  
 ABS. MONOCYTES 0.8 0.0 - 1.0 K/UL ABS. EOSINOPHILS 0.1 0.0 - 0.4 K/UL  
 ABS. BASOPHILS 0.0 0.0 - 0.1 K/UL  
 ABS. IMM. GRANS. 0.1 (H) 0.00 - 0.04 K/UL  
 DF SMEAR SCANNED    
 RBC COMMENTS ANISOCYTOSIS 2+ 
    
 RBC COMMENTS STOMATOCYTES PRESENT 
    
 WBC COMMENTS TOXIC GRANULATION    
METABOLIC PANEL, COMPREHENSIVE Collection Time: 10/11/20  3:49 AM  
Result Value Ref Range Sodium 141 136 - 145 mmol/L Potassium 3.9 3.5 - 5.1 mmol/L Chloride 108 97 - 108 mmol/L  
 CO2 29 21 - 32 mmol/L Anion gap 4 (L) 5 - 15 mmol/L Glucose 173 (H) 65 - 100 mg/dL BUN 19 6 - 20 MG/DL Creatinine 0.90 0.55 - 1.02 MG/DL  
 BUN/Creatinine ratio 21 (H) 12 - 20 GFR est AA >60 >60 ml/min/1.73m2 GFR est non-AA >60 >60 ml/min/1.73m2 Calcium 7.7 (L) 8.5 - 10.1 MG/DL Bilirubin, total 0.8 0.2 - 1.0 MG/DL  
 ALT (SGPT) 21 12 - 78 U/L  
 AST (SGOT) 34 15 - 37 U/L Alk. phosphatase 270 (H) 45 - 117 U/L Protein, total 5.1 (L) 6.4 - 8.2 g/dL Albumin 2.1 (L) 3.5 - 5.0 g/dL Globulin 3.0 2.0 - 4.0 g/dL A-G Ratio 0.7 (L) 1.1 - 2.2 SARS-COV-2 Collection Time: 10/11/20  3:49 AM  
Result Value Ref Range Specimen source Nasopharyngeal    
 SARS-CoV-2 PENDING   
 SARS-CoV-2 PENDING Specimen source Nasopharyngeal    
 COVID-19 rapid test PENDING Specimen type NP Swab Health status PENDING   
 COVID-19 PENDING   
GLUCOSE, POC Collection Time: 10/11/20  6:37 AM  
Result Value Ref Range Glucose (POC) 144 (H) 65 - 100 mg/dL Performed by Theodore Sanchez Medications reviewed Current Facility-Administered Medications Medication Dose Route Frequency  alogliptin (NESINA) tablet 25 mg  25 mg Oral DAILY  predniSONE (DELTASONE) tablet 10 mg  10 mg Oral BID WITH MEALS  
 ipratropium (ATROVENT) 0.02 % nebulizer solution 0.5 mg  0.5 mg Nebulization Q6HWA RT  
 HYDROmorphone (DILAUDID) syringe 0.5-1 mg  0.5-1 mg IntraVENous Multiple  ondansetron (ZOFRAN) injection 4 mg  4 mg IntraVENous PRN  
  cyanocobalamin (VITAMIN B12) tablet 1,000 mcg  1,000 mcg Oral DAILY  ferrous sulfate tablet 325 mg  1 Tab Oral DAILY WITH BREAKFAST  metoprolol tartrate (LOPRESSOR) tablet 25 mg  25 mg Oral Q12H  pantoprazole (PROTONIX) tablet 40 mg  40 mg Oral ACB&D  
 0.9% sodium chloride infusion 250 mL  250 mL IntraVENous PRN  
 arformoteroL (BROVANA) neb solution 15 mcg  15 mcg Nebulization BID RT  
 budesonide (PULMICORT) 500 mcg/2 ml nebulizer suspension  500 mcg Nebulization BID RT  
 digoxin (LANOXIN) tablet 0.125 mg  0.125 mg Oral DAILY  influenza vaccine 2020-21 (6 mos+)(PF) (FLUARIX/FLULAVAL/FLUZONE QUAD) injection 0.5 mL  0.5 mL IntraMUSCular PRIOR TO DISCHARGE  metoprolol tartrate (LOPRESSOR) tablet 25 mg  25 mg Oral DAILY PRN  
 mirtazapine (REMERON) tablet 15 mg  15 mg Oral QHS  DULoxetine (CYMBALTA) capsule 60 mg  60 mg Oral DAILY  montelukast (SINGULAIR) tablet 10 mg  10 mg Oral DAILY  HYDROcodone-acetaminophen (NORCO) 5-325 mg per tablet 1 Tab  1 Tab Oral Q4H PRN  
 [Held by provider] furosemide (LASIX) tablet 20 mg  20 mg Oral DAILY  morphine injection 1 mg  1 mg IntraVENous Q6H PRN  
 0.9% sodium chloride infusion 250 mL  250 mL IntraVENous PRN  
 sodium chloride (NS) flush 5-40 mL  5-40 mL IntraVENous Q8H  
 sodium chloride (NS) flush 5-40 mL  5-40 mL IntraVENous PRN  
 acetaminophen (TYLENOL) tablet 650 mg  650 mg Oral Q6H PRN Or  
 acetaminophen (TYLENOL) suppository 650 mg  650 mg Rectal Q6H PRN  polyethylene glycol (MIRALAX) packet 17 g  17 g Oral DAILY PRN  promethazine (PHENERGAN) tablet 12.5 mg  12.5 mg Oral Q6H PRN  Or  
 ondansetron (ZOFRAN) injection 4 mg  4 mg IntraVENous Q6H PRN  
 glucose chewable tablet 16 g  4 Tab Oral PRN  
 dextrose (D50W) injection syrg 12.5-25 g  25-50 mL IntraVENous PRN  
 glucagon (GLUCAGEN) injection 1 mg  1 mg IntraMUSCular PRN  
 insulin lispro (HUMALOG) injection   SubCUTAneous AC&HS  
  0.9% sodium chloride infusion 250 mL  250 mL IntraVENous PRN Care Plan discussed with: Patient and Nurse Total time spent with patient and review of records: 30 minutes.  
Lisa Power MD

## 2020-10-11 NOTE — PROGRESS NOTES
Problem: Mobility Impaired (Adult and Pediatric) Goal: *Acute Goals and Plan of Care (Insert Text) Description:  
 
FUNCTIONAL STATUS PRIOR TO ADMISSION: Patient was modified independent using a rollator for functional mobility. Patient reports short distances only and requires lots of seated breaks on rollator. HOME SUPPORT PRIOR TO ADMISSION: The patient lived alone. Daughter is nearby. Reports she calls for security at [facility] when she needs help. Per pt report her and her daughter are looking into hiring private care aides for when she discharges. Physical Therapy Goals Problem: Mobility Impaired (Adult and Pediatric) Goal: *Acute Goals and Plan of Care (Insert Text) Description: Physical Therapy Goals Revised 10/9/2020 1. Patient will move from supine to sit and sit to supine  in bed with minimal assistance/contact guard assist within 7 day(s). 2.  Patient will transfer from bed to chair and chair to bed with minimal assistance/contact guard assist using the least restrictive device within 7 day(s). 3.  Patient will perform sit to stand with minimal assistance/contact guard assist within 7 day(s). 4.  Patient will ambulate with minimal assistance/contact guard assist for 25 feet with the least restrictive device within 7 day(s). Initiated 10/2/2020 1. Patient will move from supine to sit and sit to supine , scoot up and down, and roll side to side in bed with moderate assistance  within 7 day(s). 2.  Patient will transfer from bed to chair and chair to bed with moderate assistance  using the least restrictive device within 7 day(s). 3.  Patient will perform sit to stand with moderate assistance  within 7 day(s). 4.  Patient will ambulate with moderate assistance  for 15 feet with the least restrictive device within 7 day(s). Outcome: Progressing Towards Goal 
Note: PHYSICAL THERAPY TREATMENT Patient: Azucena Deluca (7 y.o. female) Date: 10/11/2020 Diagnosis: Syncope and collapse [R55] <principal problem not specified> Procedure(s) (LRB): ESOPHAGOGASTRODUODENOSCOPY (EGD) (N/A) COLONOSCOPY (N/A) Precautions: Fall, Back, Skin Chart, physical therapy assessment, plan of care and goals were reviewed. ASSESSMENT Patient continues with skilled PT services and progressing towards goals. Mod/Max A x2 for bed mobility>sitting EOB endorses dizziness with positional changes, Educated for PLB throughtout session. BP not orthostatic during this session. One attempt to stand, pt SOB and anxious unable to side step toward HOB. Pt assisted BTB with bed placed in chair position Current Level of Function Impacting Discharge (mobility/balance): mod/max A x2 Other factors to consider for discharge: PLAN : 
Patient continues to benefit from skilled intervention to address the above impairments. Continue treatment per established plan of care. to address goals. Recommendation for discharge: (in order for the patient to meet his/her long term goals) Therapy up to 5 days/week in SNF setting This discharge recommendation: 
Has been made in collaboration with the attending provider and/or case management IF patient discharges home will need the following DME: to be determined (TBD) SUBJECTIVE:  
Patient stated i am scared.  OBJECTIVE DATA SUMMARY:  
Critical Behavior: 
Neurologic State: Alert, Appropriate for age Orientation Level: Oriented X4 Cognition: Follows commands Safety/Judgement: Awareness of environment Functional Mobility Training: 
Bed Mobility: 
  
Supine to Sit: Maximum assistance;Assist x1 Sit to Supine: Moderate assistance;Assist x2 Transfers: 
Sit to Stand: Moderate assistance;Assist x2 Stand to Sit: Moderate assistance;Assist x2 Balance: 
Sitting: Intact; High guard Standing: Impaired; With support Standing - Static: Constant support; Fair 
Standing - Dynamic : Poor Ambulation/Gait Training: 
  
  
  
  
  
  
  
  
  
  
  
  
  
  
  
  
  
  
Stairs: Therapeutic Exercises:  
 
Pain Ratin/10 back pain sitting EOB Activity Tolerance:  
Fair Please refer to the flowsheet for vital signs taken during this treatment. After treatment patient left in no apparent distress:  
Supine in bed and Call bell within reach COMMUNICATION/COLLABORATION:  
The patients plan of care was discussed with: Registered nurse. Tyshawn Araujo Time Calculation: 25 mins

## 2020-10-12 NOTE — DISCHARGE INSTRUCTIONS
Patient Discharge Instructions    Jhony Darby / [id number] : 1945    Admitted 2020 Discharged: 10/12/2020 2:35 PM     ACUTE DIAGNOSES:  Syncope and collapse [R55]    CHRONIC MEDICAL DIAGNOSES:  Problem List as of 10/12/2020 Date Reviewed: 2020          Codes Class Noted - Resolved    Syncope and collapse ICD-10-CM: R55  ICD-9-CM: 780.2  2020 - Present        Cellulitis of lower extremity ICD-10-CM: L03.119  ICD-9-CM: 682.6  3/23/2020 - Present        ASO (arteriosclerosis obliterans) ICD-10-CM: I70.90  ICD-9-CM: 440.9  2019 - Present        PVD (peripheral vascular disease) ([facility] ICD-10-CM: I73.9  ICD-9-CM: 443.9  2019 - Present        Severe obesity ([facility] ICD-10-CM: E66.01  ICD-9-CM: 278.01  2019 - Present        UTI (urinary tract infection) ICD-10-CM: N39.0  ICD-9-CM: 599.0  2019 - Present        COPD (chronic obstructive pulmonary disease) ([facility] ICD-10-CM: J44.9  ICD-9-CM: 496  2019 - Present        Sepsis ([facility] ICD-10-CM: A41.9  ICD-9-CM: 038.9, 995.91  2019 - Present        Normocytic anemia ICD-10-CM: D64.9  ICD-9-CM: 285.9  2019 - Present        PAD (peripheral artery disease) (HCC) ICD-10-CM: I73.9  ICD-9-CM: 443.9  2019 - Present        Mild intermittent asthma ICD-10-CM: J45.20  ICD-9-CM: 493.90  2019 - Present        Gangrene of finger of right hand ([facility] ICD-10-CM: I96  ICD-9-CM: 785.4  2019 - Present        Brachial artery thrombus (HCC) ICD-10-CM: I74.2  ICD-9-CM: 444.21  2019 - Present        Sleep apnea ICD-10-CM: G47.30  ICD-9-CM: 780.57  2019 - Present    Overview Signed 2019  8:41 PM by Nallely Khoury DO     wears CPAP             Atrial fibrillation ([facility] ICD-10-CM: I48.91  ICD-9-CM: 427.31  2018 - Present        Obesity (BMI 30-39.9) (Chronic) ICD-10-CM: D70.6  ICD-9-CM: 278.00  2018 - Present        Type 2 diabetes with nephropathy ([facility] ICD-10-CM: E11.21  ICD-9-CM: 250.40, 583.81 10/1/2018 - Present        Recurrent deep vein thrombosis (DVT) (HCC) ICD-10-CM: I82.409  ICD-9-CM: 453.40  3/30/2018 - Present        Chronic anticoagulation (Chronic) ICD-10-CM: Z79.01  ICD-9-CM: V58.61  3/30/2018 - Present        Coronary artery disease involving native coronary artery without angina pectoris (Chronic) ICD-10-CM: I25.10  ICD-9-CM: 414.01  1/22/2016 - Present        Essential hypertension (Chronic) ICD-10-CM: I10  ICD-9-CM: 401.9  1/22/2016 - Present        CAD (coronary artery disease) ICD-10-CM: I25.10  ICD-9-CM: 414.00  10/9/2015 - Present        Type II diabetes mellitus (HCC) (Chronic) ICD-10-CM: E11.9  ICD-9-CM: 250.00  10/9/2015 - Present        RESOLVED: Cellulitis ICD-10-CM: L03.90  ICD-9-CM: 682.9  3/23/2020 - 5/29/2020        RESOLVED: Hypokalemia ICD-10-CM: E87.6  ICD-9-CM: 276.8  3/23/2020 - 5/29/2020        RESOLVED: Hyponatremia ICD-10-CM: E87.1  ICD-9-CM: 276.1  3/23/2020 - 5/29/2020        RESOLVED: Diarrhea ICD-10-CM: R19.7  ICD-9-CM: 787.91  3/23/2020 - 5/29/2020        RESOLVED: Syncope and collapse ICD-10-CM: R55  ICD-9-CM: 780.2  7/13/2019 - 5/29/2020        RESOLVED: Leg wound, left ICD-10-CM: D99.354V  ICD-9-CM: 891.0  7/13/2019 - 5/29/2020        RESOLVED: Leg laceration, right, initial encounter ICD-10-CM: Y23.956W  ICD-9-CM: 894.0  7/13/2019 - 5/29/2020        RESOLVED: Cellulitis of right upper arm ICD-10-CM: L03.113  ICD-9-CM: 682.3  5/17/2019 - 5/29/2020        RESOLVED: Bowel obstruction ([address] ICD-10-CM: L05.025  ICD-9-CM: 560.9  1/8/2019 - 5/29/2020        RESOLVED: Partial small bowel obstruction ([facility] ICD-10-CM: K56.600  ICD-9-CM: 560.9  1/5/2019 - 5/29/2020        RESOLVED: Dyspnea ICD-10-CM: R06.00  ICD-9-CM: 786.09  11/13/2018 - 5/29/2020        RESOLVED: ARF (acute renal failure) ([facility] ICD-10-CM: N17.9  ICD-9-CM: 584.9  11/13/2018 - 5/29/2020        RESOLVED: Closed wedge compression fracture of T7 vertebra ([facility] ICD-10-CM: H11.361I  ICD-9-CM: 805.2 11/13/2018 - 5/29/2020        RESOLVED: Chest pain ICD-10-CM: R07.9  ICD-9-CM: 786.50  6/27/2018 - 5/29/2020        RESOLVED: Abdominal pain ICD-10-CM: R10.9  ICD-9-CM: 789.00  6/27/2018 - 5/29/2020        RESOLVED: HTN (hypertension) ICD-10-CM: I10  ICD-9-CM: 401.9  10/9/2015 - 1/22/2016        RESOLVED: NSTEMI (non-ST elevated myocardial infarction) [facility]) ICD-10-CM: I21.4  ICD-9-CM: 410.70  10/9/2015 - 5/29/2020              DISCHARGE MEDICATIONS:         · It is important that you take the medication exactly as they are prescribed. · Keep your medication in the bottles provided by the pharmacist and keep a list of the medication names, dosages, and times to be taken in your wallet. · Do not take other medications without consulting your doctor. DIET:  Diabetic Diet    ACTIVITY: Activity as tolerated    ADDITIONAL INFORMATION: If you experience any of the following symptoms then please call your primary care physician or return to the emergency room if you cannot get hold of your doctor: Fever, chills, nausea, vomiting, diarrhea, change in mentation, falling, bleeding, shortness of breath. FOLLOW UP CARE:  Dr. Any Espinoza MD  you are to call and set up an appointment to see them with in 1 week. Follow-up with specialists at directed by them      Information obtained by :  I understand that if any problems occur once I am at home I am to contact my physician. I understand and acknowledge receipt of the instructions indicated above.                                                                                                                                            Physician's or R.N.'s Signature                                                                  Date/Time                                                                                                                                              Patient or Representative Signature Date/Time

## 2020-10-12 NOTE — PROGRESS NOTES
10/12/2020   CARE MANAGEMENT NOTE:  CM reviewed EMR for clinical updates.  Pt was admitted with syncope and collapse.  Also with L3 compression fx and is s/p Kyphoplasty on 10/8. Reportedly, pt resides alone. 
  
RUR 27%; LOS 12 days 
  
Transition Plan of Care: 1.  SNF - [place] accepted 2.  COVID 19 test for SNF placement: 10/6 negative; 10/7 test negative; 10/10 negative and 10/11 negative 3.  Outpt f/u 4. [facility] transportation upon discharge. 
  
CM will continue to follow pt for SNF placement. Jeanette

## 2020-10-12 NOTE — PROGRESS NOTES
Problem: Self Care Deficits Care Plan (Adult) Goal: *Acute Goals and Plan of Care (Insert Text) Description:  
FUNCTIONAL STATUS PRIOR TO ADMISSION: Patient was modified independent using a rollator for functional mobility. Patient reports short distances only and requires lots of seated breaks on rollator. She states she manages her care independently but admits to difficulty with LB tasks (putting on socks and bathing LB). HOME SUPPORT PRIOR TO ADMISSION: The patient lived alone. Daughter is nearby. Reports she calls for security at \[facility]\"" when she needs help. Per pt report her and her daughter are looking into hiring private care aides for when she discharges. Occupational Therapy Goals Re assessment following kyphoplasty- 10/9/2020- goals remain appropriate following procedure. Initiated 10/5/2020 1. Patient will perform grooming with modified independence from unsupported seated position within 7 day(s). 2.  Patient will perform upper body dressing and bathing with modified independence within 7 day(s). 3.  Patient will perform lower body dressing and bathing with moderate assistance using AE PRN within 7 day(s). 4.  Patient will perform toilet transfers to [facility] with moderate assistance  within 7 day(s). 5.  Patient will perform all aspects of toileting with moderate assistance  within 7 day(s). 6.  Patient will participate in upper extremity therapeutic exercise/activities with supervision/set-up for 10 minutes within 7 day(s). 7.  Patient will utilize energy conservation techniques during functional activities with verbal cues within 7 day(s). Outcome: Progressing Towards Goal 
  
OCCUPATIONAL THERAPY TREATMENT Patient: Desi Joseph (33 y.o. female) Date: 10/12/2020 Diagnosis: Syncope and collapse [R55] <principal problem not specified> Procedure(s) (LRB): ESOPHAGOGASTRODUODENOSCOPY (EGD) (N/A) COLONOSCOPY (N/A) Precautions: Fall, Back, Skin Chart, occupational therapy assessment, plan of care, and goals were reviewed. ASSESSMENT Patient continues with skilled OT services and is progressing towards goals. She demonstrated improved functional mobility this session and take a few steps up side of bed in preporation for [facility] transfers. Educated pt on energy conservation techniques to improve her safety and independence with all functional tasks. She states she has been utilizing these techniques at home and was able to verbalize several task modification she does daily. She does require mod verbal instruction to perform PLB and becomes SOB even with talking. O2 sats on 3L O2 were 95% and this is her baseline O2 at home. Recommend SNF at discharge. Current Level of Function Impacting Discharge (ADLs): max A LE ADLs and toileting; max A bed mobility and mod A x2 sit to stand and then mod A x1 to amb 3 feet up side of bed using RW Other factors to consider for discharge: see above PLAN : 
Patient continues to benefit from skilled intervention to address the above impairments. Continue treatment per established plan of care. to address goals. Recommend with staff: chair position in bed for every meal 
 
Recommend next OT session: BSC transfers if able, endurance, BUE AROM exer Recommendation for discharge: (in order for the patient to meet his/her long term goals) Therapy up to 5 days/week in SNF setting This discharge recommendation: 
Has been made in collaboration with the attending provider and/or case management IF patient discharges home will need the following DME: TBD SUBJECTIVE:  
Patient stated I am too scared of that chair to get in it.  OBJECTIVE DATA SUMMARY:  
Cognitive/Behavioral Status: 
Neurologic State: Alert; Appropriate for age Orientation Level: Oriented X4 Cognition: Appropriate decision making; Appropriate for age attention/concentration; Appropriate safety awareness; Follows commands Perception: Appears intact Perseveration: No perseveration noted Safety/Judgement: Awareness of environment; Fall prevention; Insight into deficits Functional Mobility and Transfers for ADLs: 
Bed Mobility: 
Supine to Sit: Moderate assistance;Assist x2 Sit to Supine: Moderate assistance; Additional time;Assist x2 Scooting: Moderate assistance; Additional time;Assist x1 Transfers: 
Sit to Stand: Moderate assistance; Additional time;Assist x2(A for forward wt shift and LE strength) Functional Transfers Toilet Transfer : Moderate assistance; Additional time;Assist x2(mod A x2 for sit to stand; mod A x1 to pivot using RW) Cues: Physical assistance; Tactile cues provided;Verbal cues provided;Visual cues provided Adaptive Equipment: Walker (comment); Bedside commode Bed to Chair: (pt too afraid to get into chair) Balance: 
Sitting: Intact Standing: Impaired; With support(RW) 
Standing - Static: Constant support; Fair 
Standing - Dynamic : Fair;Constant support(3 steps only) ADL Intervention: 
Patient instructed and indicated understanding energy conservation techniques to increase independence and safety during all ADLs for end goal of returning back home. Provided instruction body is like a jar of marbles, marbles represent energy, at end of day need as many marbles as possible to obtain a good night sleep, REM sleep is when the body repairs itself. This ensures a full jar of marbles, full of energy when wake up to start a new day. Use energy conservation techniques during ADLs so can increase participation in life activities patient prefers, to ensure more frequent good days. If having a bad day, evaluate tasks completed day before and re-plan how to save energy to complete same tasks, for example if going grocery shopping do not complete full bathing/dressing/grooming.   Patient indicated understanding by stating tasks already completing to save energy ie sitting to don all clothing, using rollator to sit for rest breaks. Cognitive Retraining Safety/Judgement: Awareness of environment; Fall prevention; Insight into deficits Pain: 
No c/o pain Activity Tolerance:  
Fair, desaturates with exertion and requires oxygen, requires frequent rest breaks, and observed SOB with activity Please refer to the flowsheet for vital signs taken during this treatment. After treatment patient left in no apparent distress:  
Supine in bed and Call bell within reach COMMUNICATION/COLLABORATION:  
The patients plan of care was discussed with: Physical therapist and Registered nurse. Sadiq Conley OT Time Calculation: 25 mins

## 2020-10-12 NOTE — PROGRESS NOTES
Problem: Mobility Impaired (Adult and Pediatric) Goal: *Acute Goals and Plan of Care (Insert Text) Description:  
 
FUNCTIONAL STATUS PRIOR TO ADMISSION: Patient was modified independent using a rollator for functional mobility. Patient reports short distances only and requires lots of seated breaks on rollator. HOME SUPPORT PRIOR TO ADMISSION: The patient lived alone. Daughter is nearby. Reports she calls for security at [facility] when she needs help. Per pt report her and her daughter are looking into hiring private care aides for when she discharges. Physical Therapy Goals Problem: Mobility Impaired (Adult and Pediatric) Goal: *Acute Goals and Plan of Care (Insert Text) Description: Physical Therapy Goals Revised 10/9/2020 1. Patient will move from supine to sit and sit to supine  in bed with minimal assistance/contact guard assist within 7 day(s). 2.  Patient will transfer from bed to chair and chair to bed with minimal assistance/contact guard assist using the least restrictive device within 7 day(s). 3.  Patient will perform sit to stand with minimal assistance/contact guard assist within 7 day(s). 4.  Patient will ambulate with minimal assistance/contact guard assist for 25 feet with the least restrictive device within 7 day(s). Initiated 10/2/2020 1. Patient will move from supine to sit and sit to supine , scoot up and down, and roll side to side in bed with moderate assistance  within 7 day(s). 2.  Patient will transfer from bed to chair and chair to bed with moderate assistance  using the least restrictive device within 7 day(s). 3.  Patient will perform sit to stand with moderate assistance  within 7 day(s). 4.  Patient will ambulate with moderate assistance  for 15 feet with the least restrictive device within 7 day(s). Outcome: Progressing Towards Goal 
 PHYSICAL THERAPY TREATMENT Patient: Ana Rowell (54 y.o. female) Date: 10/12/2020 Diagnosis: Syncope and collapse [R55] <principal problem not specified> Procedure(s) (LRB): ESOPHAGOGASTRODUODENOSCOPY (EGD) (N/A) COLONOSCOPY (N/A) Precautions: Fall, Back, Skin Chart, physical therapy assessment, plan of care and goals were reviewed. ASSESSMENT Patient continues with skilled PT services and is progressing towards goals. Patient motivated to participate however unwilling to sit up in recliner and also refuses to walk around the room. Patient able to perform sit to stand x 2 mod A x 2 and then sidestep left 2-3 steps to head of bed with RW min A x 1. Returned to bed, LE mod A up in bed. Patient was on 2L and dyspnea with SATs 98%. Bumped up 02 to 3L with VC for inhalation via nares vs mouth breathing. Patient at home  reports she is on 3L continuous day/night. Patient will benefit from advancing ambulation distance due to low CV endurance and will require 2 people for safety with rest breaks and while on supplemental 02. Current Level of Function Impacting Discharge (mobility/balance): mod A x 2; additional time Other factors to consider for discharge: sedentary lifestyle PLAN : 
Patient continues to benefit from skilled intervention to address the above impairments. Continue treatment per established plan of care. to address goals. Recommendation for discharge: (in order for the patient to meet his/her long term goals) Therapy up to 5 days/week in SNF setting or intensive home health therapy program 
 
This discharge recommendation: 
Has not yet been discussed the attending provider and/or case management IF patient discharges home will need the following DME: to be determined (TBD) SUBJECTIVE:  
Patient stated I am on 3L at home.  OBJECTIVE DATA SUMMARY:  
Critical Behavior: 
Neurologic State: Alert, Appropriate for age Orientation Level: Oriented X4 Cognition: Appropriate decision making, Appropriate for age attention/concentration, Appropriate safety awareness, Follows commands Safety/Judgement: Awareness of environment, Fall prevention, Insight into deficits Functional Mobility Training: 
Bed Mobility: 
  
Supine to Sit: Moderate assistance;Assist x2 Sit to Supine: Moderate assistance; Additional time;Assist x2 Scooting: Moderate assistance; Additional time;Assist x1 Transfers: 
Sit to Stand: Moderate assistance; Additional time;Assist x2(A for forward wt shift and LE strength) Stand to Sit: Moderate assistance; Additional time;Assist x1 Bed to Chair: (pt too afraid to get into chair) Balance: 
Sitting: Intact Standing: Impaired; With support(RW) 
Standing - Static: Constant support; Fair 
Standing - Dynamic : Fair;Constant support(3 steps only) Ambulation/Gait Training: 
Distance (ft): 2 Feet (ft) Assistive Device: Gait belt;Walker, rolling Ambulation - Level of Assistance: Moderate assistance;Assist x1;Additional time Gait Abnormalities: Altered arm swing Speed/Es: Slow Stairs:did not occur Pain Ratin/10 Activity Tolerance:  
requires frequent rest breaks and observed SOB with activity Please refer to the flowsheet for vital signs taken during this treatment. After treatment patient left in no apparent distress:  
Supine in bed and Call bell within reach COMMUNICATION/COLLABORATION:  
The patients plan of care was discussed with: Registered nurse.   
 
Eulalio Eubanks DPT

## 2020-10-12 NOTE — PROGRESS NOTES
Daily Progress Note and Discharge Note: 10/12/2020 Atul Sandhu PCP: 
Elijah Coy MD 
 
Assessment/Plan:  
Acute Syncopal Episode - likely due to low BP from anemia- resolved 
- monitor/tx as needed 
- monitor Hb at least twice weekly at [facility] Hypotension - resolved 
- off pressors 10/2 CAD- stable CHF 
- Diuretics as needed Chronic A-fib with acute exacerbations of RVR 
- Holding Eliquis due to severe anemia and undiagnosed site of bleeding - Cards consulted - Metoprolol BID Acute on chronic anemia, with hemorrhagic shock 10/1/20- resolved - Transfuse as needed - GI consulted - colonoscopy and EGD refused by pt - PPI 
- monitor at [facility] Leukocytosis: ddx chronic leg wounds, atelectasis, steroids 
- added keflex 
- encouraged IS 
- wean steroids to lowest dose possible at SNF/rehab DM2- trend sugars 
- SSI inpt 
- onglyza outpt [facility] - CPAP Depression/Anxiety - Remeron and Cymbata Nondisplaced acute fracture L3 superior endplate. 
- kyphoplasty done10/8 by IR 
- Ortho has seen 
-PT, SNF rehab Problem List: 
Problem List as of 10/12/2020 Date Reviewed: 5/29/2020 Codes Class Noted - Resolved Syncope and collapse ICD-10-CM: R55 
ICD-9-CM: 780.2  9/29/2020 - Present Cellulitis of lower extremity ICD-10-CM: L03.119 ICD-9-CM: 682.6  3/23/2020 - Present ASO (arteriosclerosis obliterans) ICD-10-CM: I70.90 ICD-9-CM: 440.9  9/5/2019 - Present PVD (peripheral vascular disease) ([facility] ICD-10-CM: I73.9 ICD-9-CM: 443.9  8/1/2019 - Present Severe obesity ([facility] ICD-10-CM: E66.01 
ICD-9-CM: 278.01  7/30/2019 - Present UTI (urinary tract infection) ICD-10-CM: N39.0 ICD-9-CM: 599.0  7/13/2019 - Present COPD (chronic obstructive pulmonary disease) (HCC) ICD-10-CM: J44.9 ICD-9-CM: 873  7/13/2019 - Present Sepsis ([facility] ICD-10-CM: A41.9 ICD-9-CM: 038.9, 995.91  7/13/2019 - Present Normocytic anemia ICD-10-CM: D64.9 ICD-9-CM: 285.9  7/13/2019 - Present PAD (peripheral artery disease) (HCC) ICD-10-CM: I73.9 ICD-9-CM: 443.9  7/13/2019 - Present Mild intermittent asthma ICD-10-CM: J45.20 ICD-9-CM: 493.90  5/17/2019 - Present Gangrene of finger of right hand [facility]) ICD-10-CM: B59 
ICD-9-CM: 785.4  5/17/2019 - Present Brachial artery thrombus (HCC) ICD-10-CM: N03.9 ICD-9-CM: 444.21  4/26/2019 - Present Sleep apnea ICD-10-CM: G47.30 ICD-9-CM: 780.57  1/5/2019 - Present Overview Signed 1/5/2019  8:41 PM by Raj Kuo DO  
  wears CPAP Atrial fibrillation [facility]) ICD-10-CM: I48.91 
ICD-9-CM: 427.31  11/16/2018 - Present Obesity (BMI 30-39.9) (Chronic) ICD-10-CM: F65.5 ICD-9-CM: 278.00  11/13/2018 - Present Type 2 diabetes with nephropathy ([facility] ICD-10-CM: E11.21 
ICD-9-CM: 250.40, 583.81  10/1/2018 - Present Recurrent deep vein thrombosis (DVT) (HCC) ICD-10-CM: I82.409 ICD-9-CM: 453.40  3/30/2018 - Present Chronic anticoagulation (Chronic) ICD-10-CM: Z79.01 
ICD-9-CM: V58.61  3/30/2018 - Present Coronary artery disease involving native coronary artery without angina pectoris (Chronic) ICD-10-CM: I25.10 ICD-9-CM: 414.01  1/22/2016 - Present Essential hypertension (Chronic) ICD-10-CM: I10 
ICD-9-CM: 401.9  1/22/2016 - Present CAD (coronary artery disease) ICD-10-CM: I25.10 ICD-9-CM: 414.00  10/9/2015 - Present Type II diabetes mellitus (HCC) (Chronic) ICD-10-CM: E11.9 ICD-9-CM: 250.00  10/9/2015 - Present RESOLVED: Cellulitis ICD-10-CM: L03.90 ICD-9-CM: 682.9  3/23/2020 - 5/29/2020 RESOLVED: Hypokalemia ICD-10-CM: E87.6 ICD-9-CM: 276.8  3/23/2020 - 5/29/2020 RESOLVED: Hyponatremia ICD-10-CM: E87.1 ICD-9-CM: 276.1  3/23/2020 - 5/29/2020 RESOLVED: Diarrhea ICD-10-CM: R19.7 ICD-9-CM: 787.91  3/23/2020 - 5/29/2020 RESOLVED: Syncope and collapse ICD-10-CM: R55 
ICD-9-CM: 780.2  7/13/2019 - 5/29/2020 RESOLVED: Leg wound, left ICD-10-CM: U79.066P ICD-9-CM: 891.0  7/13/2019 - 5/29/2020 RESOLVED: Leg laceration, right, initial encounter ICD-10-CM: W49.899L ICD-9-CM: 894.0  7/13/2019 - 5/29/2020 RESOLVED: Cellulitis of right upper arm ICD-10-CM: L03.113 ICD-9-CM: 682.3  5/17/2019 - 5/29/2020 RESOLVED: Bowel obstruction ([facility] ICD-10-CM: X26.102 ICD-9-CM: 560.9  1/8/2019 - 5/29/2020 RESOLVED: Partial small bowel obstruction ([facility] ICD-10-CM: K56.600 ICD-9-CM: 560.9  1/5/2019 - 5/29/2020 RESOLVED: Dyspnea ICD-10-CM: R06.00 
ICD-9-CM: 786.09  11/13/2018 - 5/29/2020 RESOLVED: ARF (acute renal failure) (HCC) ICD-10-CM: N17.9 ICD-9-CM: 584.9  11/13/2018 - 5/29/2020 RESOLVED: Closed wedge compression fracture of T7 vertebra ([facility] ICD-10-CM: A07.086M ICD-9-CM: 805.2  11/13/2018 - 5/29/2020 RESOLVED: Chest pain ICD-10-CM: R07.9 ICD-9-CM: 786.50  6/27/2018 - 5/29/2020 RESOLVED: Abdominal pain ICD-10-CM: R10.9 ICD-9-CM: 789.00  6/27/2018 - 5/29/2020 RESOLVED: HTN (hypertension) ICD-10-CM: I10 
ICD-9-CM: 401.9  10/9/2015 - 1/22/2016 RESOLVED: NSTEMI (non-ST elevated myocardial infarction) ([facility] ICD-10-CM: I21.4 ICD-9-CM: 410.70  10/9/2015 - 5/29/2020 HPI:  
 72-year-old female with multiple medical problems including obesity, sleep apnea, non-ST-elevation myocardial infarction, primary hypertension, congestive heart failure, gastroesophageal reflux disease, type 2 diabetes mellitus, temporal arteritis, fibromyalgia, atrial fibrillation and asthma, was brought to the emergency room from home for complaints of lightheadedness, dizziness with subsequent fall and low back pain.   Patient stated that whilst preparing some dinner in the evening hours of 09/29/2020, had the onset of symptoms with subsequent fall in a sitting position. Thereafter, patient noted persistent low back pain. Patient denied any recent changes in medication. Denied headaches, visual deficits, chest pain, palpitations, sore throat, cough, shortness of breath, nausea, vomiting, fevers, chills, abdominal pain, bladder or bowel irregularities. (Dr Nahomi Marshall) 9/30: She is c/o pain in her back. Meds are helping. Hb 5.1 and she has been transfused with 2 units. AM labs pending and she will need a line as she is a difficult stick. May need more blood. She reports her stools have been dark so will ask GI to see. Holding Eliquis. She is on chronic steroids due to TA.  
 
10/1:  Heart rate up to 180s this AM - seems to have spiked up due to back pain but now back in 110s. No other complaint except back pain. No chest pain or SOB. Hb 7.1 this AM - watching. GI planning colonoscopy - starting prep. Poss kyphoplasty when more stable. 10/2:  Reports she feels \"just bad all over\" this AM.  Last night became hypotensive, dropped Hb again, required another transfusion of 1 unit, and Roland had to be started to maintain BP - remains on Roland this AM.  Also mult episodes of RVR of her A fib - Cards treating and Dilt drip started. Transferred to ICU. Hb up to 8.8 this AM.  Tmax 99. WBC up a little from previous -watching. 10/3:  Feeling much better this AM and sitting up eating breakfast.  Back in sinus rhythm with rate in 80s at this time. BP better. Off pressors. Off IV dilt today and on BB. Hb 8.0. Hopefully out of ICU later today or tomorrow. 10/4:  Continues to feel better. BP ok. Out of ICU today. 10/5:  Little change from yesterday. No new complaints. Still c/o back pain with movement. She may be stable enough for the GI work up and for Kyphoplasty - per GI and IR. Hb 8.0. 
 
10/6:  AM labs pending. Still c/o pain. Holding anticoag. More fatigued. 10/7:  Still c/o back pain with movement.   She would like to have the kyphoplasty done prior to going to rehab. Anticoags have been held and Hb has been stable last few days so perhaps IR can do the kyphoplasty - will consult them. Covid testing for SNF being done. 10/8:  Little change except in better spirits today. Kyphoplasty planned for today. Covid testing for SNF pending as of this AM.   
 
10/9:  Marked improvement in back pain with Kyphoplasty done yesterday. No back pain at all this AM so far. She wants to do more with PT/OT today. 2nd Covid negative. Stable for rehab.   
 
10/10:  Pt feels tired today. [facility] has accepted her, but her COIVD tests are too old. Needs 2 more. Tolerating PO, +BM yesterday. Wants to get up with therapy again today. Back pain continues to be improved. 10/11:  Sugars are borderline, but just restarted onglyza yesterday. WBC up, afebrile. Denies cough, dysuria, increased pain in legs. Ddx for this is either leg wounds or atelectasis- encouraged IS. Tolerating PO.  +BM. Miguel Angel Moon to go to SNF when available. 10/12:  No complaints. Back is better. Afeb. WBC up likely due to Prednisone. Stable for rehab when bed ready. 130PM:  Accepted at [facility] for rehab. At rehab, check Hb frequently. Discussed resumption of anticoagulation, risks v benefits. With undiagnosed source of bleeding, and severe hypotension/shock from blood loss earlier in admission, will hold anticoagulation until she proves to be stable for another week and then readdress possibility of start of NOAC. If stable, anticoagulation could be restarted at rehab. Pt agrees with this approach. Her DM will also need to be titrated at rehab with meds added as needed. Prednisone could be weaned further at rehab. Review of Systems: A comprehensive review of systems was negative except for that written in the HPI. Objective:  
Physical Exam:  
Visit Vitals BP (!) 156/78 (BP 1 Location: Left arm, BP Patient Position: At rest) Pulse 74 Temp 98 °F (36.7 °C) Resp 16 Ht 5' 7\" (1.702 m) Wt 116 kg (255 lb 11.2 oz) SpO2 96% BMI 40.05 kg/m² O2 Flow Rate (L/min): 3 l/min O2 Device: Nasal cannula Temp (24hrs), Av.4 °F (36.9 °C), Min:98 °F (36.7 °C), Max:98.6 °F (37 °C) 
  10/11 1901 - 10/12 0700 In: 120 [P.O.:120] Out: -    10/10 07 - 10/11 190 In: 360 [P.O.:360] Out: 1100 [Urine:1100] General:  Alert, cooperative, no distress, obese Head:  Normocephalic, without obvious abnormality, atraumatic. Eyes:  Conjunctivae/corneas clear. PERRL, EOMs intact. Throat: Lips, mucosa, and tongue normal. Teeth and gums normal.  
Neck: Supple, symmetrical, trachea midline, no adenopathy, thyroid: no enlargement/tenderness/nodules, no carotid bruit and no JVD. Lungs:   Clear to auscultation bilaterally. Chest wall:  No tenderness or deformity. Heart:  Currently in sinus rhythm with rate ok, no murmur, click, rub or gallop. Abdomen:   Soft, non-tender. Bowel sounds normal. No masses,  No organomegaly. Extremities: Extremities with weeping and swelling, erythematous, dressed. Less edema. No calf tenderness or cords. Multiple partially amputated fingers R hand Pulses: 1+ and symmetric all extremities. Skin: Skin color, texture, turgor normal.   
Neurologic:   Alert and oriented X 3. Fine motor of hands and fingers normal.   equal.  Gait not tested at this time. Sensation grossly normal to touch. Gross motor of extremities normal.    
 
Data Review:  
CT Head IMPRESSION: No acute findings. EXAM:  CT CERVICAL SPINE WITHOUT CONTRAST INDICATION: fall w head injury. COMPARISON: None. CONTRAST:  None. FINDINGS: 
Alignment is satisfactory, no acute fracture or dislocation. Moderate spondylosis and disc degeneration at C5-6 IMPRESSION 
 impression: No acute changes. CT LS spine Axial CT scan of the lumbar sacral spine obtained without contrast with coronal 
 and sagittal reconstructions. No prior. CT dose reduction was achieved through 
the use of a standardized protocol tailored for this examination and automatic 
exposure control for dose modulation . Kaylie Messing There is a minimally displaced fracture of the superior endplate of L3 without 
significant canal compromise. There is some mild diffuse spondylosis and disc degeneration. IMPRESSION 
 impression: Nondisplaced acute fracture L3 superior endplate. ECHO 9/30/20 Interpretation Summary Result status: Final result · LV: Estimated LVEF is 60 - 65%. Visually measured ejection fraction. Normal cavity size and systolic function (ejection fraction normal). Mild concentric hypertrophy. Wall motion: normal. 
· AV: With no evidence of reduced excursion. Kyphoplasty L3 10/8/20 Procedure: 1. Fluoroscopy guided placement of cannulas into the bilateral L3 pedicles. 2. Balloon kyphoplasty of the L3 vertebral body with cement augmentation. 3. Sedation provided by the anesthesia department. FLUOROSCOPY TIME: 8.0 min FLUOROSCOPY DOSE: 1382 mGy Body: 
Following discussion of risks, benefits and alternatives, informed written 
consent was obtained. The patient was placed prone on the fluoroscopic exam table and prepped and 
draped in sterile fashion. The correct level was identified fluoroscopically. 1% lidocaine was administered to the subcutaneous tissues for local anesthesia, 
and 0.25% bupivacaine was administered into the periosteum of of the bilateral 
pedicles under fluoroscopic guidance using a 22-gauge 10 cm spinal needle for 
local anesthesia. Under biplane fluoroscopic guidance, the left L3 pedicle was 
traversed with a Kyphon cannula. The tip of the cannula was placed just within 
the vertebral body. A drill was then advanced to within the anterior portion of 
the vertebral body. This procedure was repeated on the right L3 pedicle. Into each cannula, a kyphoplasty balloon was inserted. Both balloons were 
inflated with to a pressure of approximately 200 PSI towards the anterior part 
of the vertebral body. The balloons were deflated and retracted slightly, just 
underneath the deformed portion of the superior endplate and reinflated slowly. There is slight reduction of the deformity. The right balloon was then removed, 
the left balloons remained in place and inflated while cement was injected into 
the right cannula under live fluoroscopic examination. The left-sided balloon 
was then deflated and removed, while the cement was injected into the left 
cannula under live fluoroscopic guidance. The devices were removed and a 
dressing was applied. There were no immediate complications. There was a small 
amount of extravasation of cement into some small paravertebral venous branches, 
however this was self-limited and there was no distal cement embolization. A 
small amount cement was also noted under live fluoroscopy examination to track 
along the right pedicle access tract. Impression: Kyphoplasty of the L3 vertebral body. Recent Days: 
Recent Labs 10/11/20 
6189 10/10/20 
0411 WBC 13.3* 11.2* HGB 8.6* 8.8* HCT 31.1* 32.0*  
 308 Recent Labs 10/11/20 
6981 10/10/20 
0411  141  
K 3.9 4.2  108 CO2 29 30 * 204* BUN 19 21* CREA 0.90 1.00  
CA 7.7* 7.4* ALB 2.1* 2.1* TBILI 0.8 0.8 ALT 21 23  
24 Hour Results: 
Recent Results (from the past 24 hour(s)) GLUCOSE, POC Collection Time: 10/11/20  6:37 AM  
Result Value Ref Range Glucose (POC) 144 (H) 65 - 100 mg/dL Performed by Ricky Crawley, POC Collection Time: 10/11/20 11:35 AM  
Result Value Ref Range Glucose (POC) 119 (H) 65 - 100 mg/dL Performed by Felicia Scotty ([facility]) GLUCOSE, POC Collection Time: 10/11/20  4:35 PM  
Result Value Ref Range Glucose (POC) 181 (H) 65 - 100 mg/dL Performed by Felicia Fajardo ([facility]) GLUCOSE, POC Collection Time: 10/11/20  9:28 PM  
Result Value Ref Range Glucose (POC) 187 (H) 65 - 100 mg/dL Performed by Lawrnce Baumgarten (CON) Medications reviewed Current Facility-Administered Medications Medication Dose Route Frequency  cephALEXin (KEFLEX) capsule 500 mg  500 mg Oral TID  alogliptin (NESINA) tablet 25 mg  25 mg Oral DAILY  predniSONE (DELTASONE) tablet 10 mg  10 mg Oral BID WITH MEALS  
 ipratropium (ATROVENT) 0.02 % nebulizer solution 0.5 mg  0.5 mg Nebulization Q6HWA RT  
 HYDROmorphone (DILAUDID) syringe 0.5-1 mg  0.5-1 mg IntraVENous Multiple  ondansetron (ZOFRAN) injection 4 mg  4 mg IntraVENous PRN  
 cyanocobalamin (VITAMIN B12) tablet 1,000 mcg  1,000 mcg Oral DAILY  ferrous sulfate tablet 325 mg  1 Tab Oral DAILY WITH BREAKFAST  metoprolol tartrate (LOPRESSOR) tablet 25 mg  25 mg Oral Q12H  pantoprazole (PROTONIX) tablet 40 mg  40 mg Oral ACB&D  
 0.9% sodium chloride infusion 250 mL  250 mL IntraVENous PRN  
 arformoteroL (BROVANA) neb solution 15 mcg  15 mcg Nebulization BID RT  
 budesonide (PULMICORT) 500 mcg/2 ml nebulizer suspension  500 mcg Nebulization BID RT  
 digoxin (LANOXIN) tablet 0.125 mg  0.125 mg Oral DAILY  influenza vaccine 2020-21 (6 mos+)(PF) (FLUARIX/FLULAVAL/FLUZONE QUAD) injection 0.5 mL  0.5 mL IntraMUSCular PRIOR TO DISCHARGE  metoprolol tartrate (LOPRESSOR) tablet 25 mg  25 mg Oral DAILY PRN  
 mirtazapine (REMERON) tablet 15 mg  15 mg Oral QHS  DULoxetine (CYMBALTA) capsule 60 mg  60 mg Oral DAILY  montelukast (SINGULAIR) tablet 10 mg  10 mg Oral DAILY  HYDROcodone-acetaminophen (NORCO) 5-325 mg per tablet 1 Tab  1 Tab Oral Q4H PRN  
 [Held by provider] furosemide (LASIX) tablet 20 mg  20 mg Oral DAILY  morphine injection 1 mg  1 mg IntraVENous Q6H PRN  
 0.9% sodium chloride infusion 250 mL  250 mL IntraVENous PRN  
  sodium chloride (NS) flush 5-40 mL  5-40 mL IntraVENous Q8H  
 sodium chloride (NS) flush 5-40 mL  5-40 mL IntraVENous PRN  
 acetaminophen (TYLENOL) tablet 650 mg  650 mg Oral Q6H PRN Or  
 acetaminophen (TYLENOL) suppository 650 mg  650 mg Rectal Q6H PRN  polyethylene glycol (MIRALAX) packet 17 g  17 g Oral DAILY PRN  promethazine (PHENERGAN) tablet 12.5 mg  12.5 mg Oral Q6H PRN Or  
 ondansetron (ZOFRAN) injection 4 mg  4 mg IntraVENous Q6H PRN  
 glucose chewable tablet 16 g  4 Tab Oral PRN  
 dextrose (D50W) injection syrg 12.5-25 g  25-50 mL IntraVENous PRN  
 glucagon (GLUCAGEN) injection 1 mg  1 mg IntraMUSCular PRN  
 insulin lispro (HUMALOG) injection   SubCUTAneous AC&HS  
 0.9% sodium chloride infusion 250 mL  250 mL IntraVENous PRN Care Plan discussed with: Patient and Nurse Total time spent with patient and review of records: 30 minutes.  
Ilene Troncoso MD

## 2020-10-12 NOTE — PROGRESS NOTES
[facility] Will [facility] with [facility] at [address] Hospital to [address] [place] [address] 
                                                                      69 y.o.   female [facility]   Room: 516/01    [facility] 5M1 MED SURG 1  Unit Phone# :[phone] [address] 
[facility] 5M1 MED SURG 1 
[address] [facility] [address] [place] [id number] Dept: [phone] Loc: [phone] SITUATION Admitted:  9/29/2020         Attending Provider:  Tacho Tiwari MD    
 
Consultations:  IP CONSULT TO CARDIOLOGY 
IP CONSULT TO ORTHOPEDIC SURGERY 
IP CONSULT TO GASTROENTEROLOGY 
IP CONSULT TO [address] [place] [address] 
 
PCP:  Santhosh Linn MD   [phone] Treatment Team: Attending Provider: Tacho Tiwari MD; Consulting Provider: Sean Castillo MD; Utilization Review: Eri Khan; Care Manager: Harleen Trinidadut Admitting Dx:  Syncope and collapse [R55] Principal Problem: <principal problem not specified> * No surgery date entered * of  
Procedure(s): ESOPHAGOGASTRODUODENOSCOPY (EGD) COLONOSCOPY  
BY: Sean Castillo MD             ON: 10/2/2020 Code Status: Full Code Advance Directives:  
Advance Care Planning 9/30/2020 Patient's Healthcare Decision Maker is: -  
Primary Decision Maker Name -  
Confirm Advance Directive Yes, not on file Patient Would Like to Complete Advance Directive - Does the patient have other document types - (Send w/patient) Yes Not W Pt  
 
 
Isolation:  Contact, Contact       MDRO: MRSA, ESBL Pain Medications given:  N/A    Last dose: N/A at  N/A Special Equipment needed: no  Type of equipment: 
  
  BACKGROUND Allergies: Allergies Allergen Reactions  Advair Diskus [Fluticasone Propion-Salmeterol] Rash  Aspartame Other (comments)  Breo Ellipta [Fluticasone Furoate-Vilanterol] Rash  Bumex [Bumetanide] Myalgia  Ciprofloxacin Rash  Statins-Hmg-Coa Reductase Inhibitors Other (comments) Muscle pain Lipitor/crestor/zocor  Sulfa (Sulfonamide Antibiotics) Rash  Tetracycline Other (comments) musclepain  Torsemide Rash and Myalgia  Zetia [Ezetimibe] Myalgia Past Medical History:  
Diagnosis Date  Asthma  Atrial fibrillation ([facility] 11/16/2018  [facility]   
 fibromyalgia  CAD (coronary artery disease) 10/9/2015  Closed wedge compression fracture of T7 vertebra ([facility] 11/13/2018  Diabetes ([facility]  DVT (deep vein thrombosis) in pregnancy  GERD (gastroesophageal reflux disease)  Heart failure ([facility]  History of vascular access device 09/30/2020  
 4f midline, 12cm at 1cm out placed in L Cephalic by Lenny Shah RN  
 HTN (hypertension)  NSTEMI (non-ST elevated myocardial infarction) ([facility] 10/9/2015  Obesity (BMI 30-39.9) 11/13/2018  Sleep apnea   
 wears CPAP Past Surgical History:  
Procedure Laterality Date  ABDOMEN SURGERY PROC UNLISTED    
 hital hernia repair, gall bladder removed  AMPUTATION TRANSMETACARPAL Right 06/12/2019  
 entire ring finger, 2nd & 3rd fingers (transmetacarpal)  BREAST SURGERY PROCEDURE UNLISTED    
 lumpectomy  CARDIAC SURG PROCEDURE UNLIST  10/9/15  
 2 stents [place]  HX COLOSTOMY    
 and reversal, post episiotomy repair [address]  HX UROLOGICAL  2009  
 bladder sling  IR KYPHOPLASTY LUMBAR  10/8/2020 Medications Prior to Admission Medication Sig  
 tiotropium bromide (Spiriva Respimat) 2.5 mcg/actuation inhaler Take 2 Puffs by inhalation daily as needed.  montelukast (Singulair) 10 mg tablet Take 10 mg by mouth daily.  sAXagliptin (ONGLYZA) 5 mg tab tablet Take 5 mg by mouth daily.  traMADoL (ULTRAM) 50 mg tablet Take 50 mg by mouth every six (6) hours as needed for Pain.  metoprolol tartrate (LOPRESSOR) 25 mg tablet Take 25 mg by mouth daily as needed (For systolic >226).  acetaminophen (TYLENOL) 325 mg tablet Take 1 Tab by mouth every four (4) hours as needed for Pain.  magnesium oxide (MAG-OX) 400 mg tablet Take 400 mg by mouth nightly.  potassium chloride SR (KLOR-CON 10) 10 mEq tablet Take 10 mEq by mouth two (2) times a day.  predniSONE (DELTASONE) 10 mg tablet Take 10 mg by mouth two (2) times daily (with meals).  Omeprazole delayed release (PRILOSEC D/R) 20 mg tablet Take 20 mg by mouth two (2) times a day.  ibandronate (BONIVA) 150 mg tablet Take 150 mg by mouth every thirty (30) days.  promethazine (PHENERGAN) 25 mg tablet Take 25 mg by mouth every eight (8) hours as needed for Nausea (Nausea not relieved by ondansetron).  mirtazapine (REMERON) 15 mg tablet Take 15 mg by mouth nightly.  albuterol (PROVENTIL VENTOLIN) 2.5 mg /3 mL (0.083 %) nebulizer solution 2.5 mg by Nebulization route every six (6) hours as needed for Wheezing or Shortness of Breath.  albuterol (PROAIR HFA) 90 mcg/actuation inhaler Take 2 Puffs by inhalation every four (4) hours as needed.  lactobacillus rhamnosus gg 10 billion cell (CULTURELLE) 10 billion cell capsule Take 1 Cap by mouth daily.  biotin 10,000 mcg cap Take 1 Cap by mouth daily.  DULoxetine (CYMBALTA) 60 mg capsule Take 60 mg by mouth daily.  cholecalciferol, vitamin D3, (Vitamin D3) 50 mcg (2,000 unit) tab Take 2,000 Units by mouth daily.  azelastine-fluticasone (DYMISTA) 137-50 mcg/spray spry 2 Sprays by Nasal route daily as needed (allergies).  mometasone-formoterol (DULERA) 200-5 mcg/actuation HFA inhaler Take 2 Puffs by inhalation two (2) times daily as needed. Hard scripts included in transfer packet no Vaccinations:   
Immunization History Administered Date(s) Administered  Influenza Vaccine ([facility] [place] [facility] Kidney) PF 10/10/2015  Tdap 03/25/2019 Readmission Risks:    Known Risks: No 
 
  
 The [employer]bit[employer] Index tool is an evidenced based tool that has more automatic generated information. The tool looks at many different items such as the age of the patient, how many times they were admitted in the last calendar year, current length of stay in the hospital and their diagnosis. All of these items are pulled automatically from information documented in the chart from various places and will generate a score that predicts whether a patient is at low (less than 13), medium (13-20) or high (21 or greater) risk of being readmitted. ASSESSMENT Temp: 98.1 °F (36.7 °C) (10/12/20 1139) Pulse (Heart Rate): 73 (10/12/20 1139) Resp Rate: 18 (10/12/20 1139)           BP: 127/69 (10/12/20 1139) O2 Sat (%): 96 % (10/12/20 1508) Weight: 116 kg (255 lb 11.2 oz)    Height: 5' 7\" (170.2 cm) (10/02/20 1018) If above not within 1 hour of discharge: 
 
BP:_____  P:____  R:____ T:_____ O2 Sat: ___%  O2: ______ Active Orders Diet DIET CARDIAC Regular Orientation: oriented to time, place, person and situation Active Behaviors: None Active Lines/Drains:  (Peg Tube / [place] / CL or S/L?): no 
 
Urinary Status: External catheter     Last BM: Last Bowel Movement Date: 10/12/20 Skin Integrity: Wound (add Wound LDA) Mobility: Very limited Weight Bearing Status: WBAT (Weight Bearing as Tolerated) Gait Training Assistive Device: Gait belt, Walker, rolling Ambulation - Level of Assistance: Moderate assistance, Assist x1, Additional time Distance (ft): 2 Feet (ft) Lab Results Component Value Date/Time  Glucose 138 (H) 10/12/2020 07:04 AM  
 Hemoglobin A1c 7.6 (H) 09/29/2020 08:27 PM  
 INR 1.1 09/30/2020 10:19 AM  
 INR 1.0 08/30/2019 03:18 PM  
 HGB 8.8 (L) 10/12/2020 07:04 AM  
 HGB 8.6 (L) 10/11/2020 03:49 AM  
  
  RECOMMENDATION  
 
 See After Visit Summary (AVS) for: · Discharge instructions · After [facility] · Special equipment needed (entered pre-discharge by Care Management) · Medication Reconciliation · Follow up Appointment(s) Report given/sent by:  Sapphire Mai RN Verbal report given to: Bard Garza, RN  FAXED to:  [employer] Estimated discharge time:  10/12/2020 at 1600 pm

## 2020-10-12 NOTE — DISCHARGE SUMMARY
Physician Discharge Summary Patient ID:   
Ivett Finney [id number] 
9 y.o. 
1945 Lucas Broussard MD 
 
Admit date: 9/29/2020 Discharge date and time: 10/12/2020 Admission Diagnoses: Syncope and collapse [R55] Chronic Diagnoses:   
Problem List as of 10/12/2020 Date Reviewed: 5/29/2020 Codes Class Noted - Resolved Syncope and collapse ICD-10-CM: R55 
ICD-9-CM: 780.2  9/29/2020 - Present Cellulitis of lower extremity ICD-10-CM: L03.119 ICD-9-CM: 682.6  3/23/2020 - Present ASO (arteriosclerosis obliterans) ICD-10-CM: I70.90 ICD-9-CM: 440.9  9/5/2019 - Present PVD (peripheral vascular disease) ([facility] ICD-10-CM: I73.9 ICD-9-CM: 443.9  8/1/2019 - Present Severe obesity ([facility] ICD-10-CM: E66.01 
ICD-9-CM: 278.01  7/30/2019 - Present UTI (urinary tract infection) ICD-10-CM: N39.0 ICD-9-CM: 599.0  7/13/2019 - Present COPD (chronic obstructive pulmonary disease) ([facility]) ICD-10-CM: J44.9 ICD-9-CM: 279  7/13/2019 - Present Sepsis ([facility] ICD-10-CM: A41.9 ICD-9-CM: 038.9, 995.91  7/13/2019 - Present Normocytic anemia ICD-10-CM: D64.9 ICD-9-CM: 285.9  7/13/2019 - Present PAD (peripheral artery disease) ([facility]) ICD-10-CM: I73.9 ICD-9-CM: 443.9  7/13/2019 - Present Mild intermittent asthma ICD-10-CM: J45.20 ICD-9-CM: 493.90  5/17/2019 - Present Gangrene of finger of right hand [facility]) ICD-10-CM: O32 
ICD-9-CM: 785.4  5/17/2019 - Present Brachial artery thrombus (HCC) ICD-10-CM: Z60.0 ICD-9-CM: 444.21  4/26/2019 - Present Sleep apnea ICD-10-CM: G47.30 ICD-9-CM: 780.57  1/5/2019 - Present Overview Signed 1/5/2019  8:41 PM by Aishwarya Ortez DO  
  wears CPAP Atrial fibrillation [facility]) ICD-10-CM: I48.91 
ICD-9-CM: 427.31  11/16/2018 - Present Obesity (BMI 30-39.9) (Chronic) ICD-10-CM: P46.7 ICD-9-CM: 278.00  11/13/2018 - Present Type 2 diabetes with nephropathy ([facility] ICD-10-CM: E11.21 
ICD-9-CM: 250.40, 583.81  10/1/2018 - Present Recurrent deep vein thrombosis (DVT) (HCC) ICD-10-CM: I82.409 ICD-9-CM: 453.40  3/30/2018 - Present Chronic anticoagulation (Chronic) ICD-10-CM: Z79.01 
ICD-9-CM: V58.61  3/30/2018 - Present Coronary artery disease involving native coronary artery without angina pectoris (Chronic) ICD-10-CM: I25.10 ICD-9-CM: 414.01  1/22/2016 - Present Essential hypertension (Chronic) ICD-10-CM: I10 
ICD-9-CM: 401.9  1/22/2016 - Present CAD (coronary artery disease) ICD-10-CM: I25.10 ICD-9-CM: 414.00  10/9/2015 - Present Type II diabetes mellitus (HCC) (Chronic) ICD-10-CM: E11.9 ICD-9-CM: 250.00  10/9/2015 - Present RESOLVED: Cellulitis ICD-10-CM: L03.90 ICD-9-CM: 682.9  3/23/2020 - 5/29/2020 RESOLVED: Hypokalemia ICD-10-CM: E87.6 ICD-9-CM: 276.8  3/23/2020 - 5/29/2020 RESOLVED: Hyponatremia ICD-10-CM: E87.1 ICD-9-CM: 276.1  3/23/2020 - 5/29/2020 RESOLVED: Diarrhea ICD-10-CM: R19.7 ICD-9-CM: 787.91  3/23/2020 - 5/29/2020 RESOLVED: Syncope and collapse ICD-10-CM: R55 
ICD-9-CM: 780.2  7/13/2019 - 5/29/2020 RESOLVED: Leg wound, left ICD-10-CM: V96.311Z ICD-9-CM: 891.0  7/13/2019 - 5/29/2020 RESOLVED: Leg laceration, right, initial encounter ICD-10-CM: D30.508K ICD-9-CM: 894.0  7/13/2019 - 5/29/2020 RESOLVED: Cellulitis of right upper arm ICD-10-CM: L03.113 ICD-9-CM: 682.3  5/17/2019 - 5/29/2020 RESOLVED: Bowel obstruction ([facility] ICD-10-CM: V13.910 ICD-9-CM: 560.9  1/8/2019 - 5/29/2020 RESOLVED: Partial small bowel obstruction ([facility] ICD-10-CM: K56.600 ICD-9-CM: 560.9  1/5/2019 - 5/29/2020 RESOLVED: Dyspnea ICD-10-CM: R06.00 
ICD-9-CM: 786.09  11/13/2018 - 5/29/2020 RESOLVED: ARF (acute renal failure) (HCC) ICD-10-CM: N17.9 ICD-9-CM: 584.9  11/13/2018 - 5/29/2020 RESOLVED: Closed wedge compression fracture of T7 vertebra ([facility] ICD-10-CM: Z43.312P ICD-9-CM: 805.2  11/13/2018 - 5/29/2020 RESOLVED: Chest pain ICD-10-CM: R07.9 ICD-9-CM: 786.50  6/27/2018 - 5/29/2020 RESOLVED: Abdominal pain ICD-10-CM: R10.9 ICD-9-CM: 789.00  6/27/2018 - 5/29/2020 RESOLVED: HTN (hypertension) ICD-10-CM: I10 
ICD-9-CM: 401.9  10/9/2015 - 1/22/2016 RESOLVED: NSTEMI (non-ST elevated myocardial infarction) ([facility].) ICD-10-CM: I21.4 ICD-9-CM: 410.70  10/9/2015 - 5/29/2020 Discharge Medications:  
Current Discharge Medication List  
  
START taking these medications Details  
cephALEXin (KEFLEX) 500 mg capsule Take 1 Cap by mouth three (3) times daily. Qty: 21 Cap, Refills: 0  
  
cyanocobalamin 1,000 mcg tablet Take 1 Tab by mouth daily. Qty: 30 Tab, Refills: 0  
  
digoxin (LANOXIN) 0.125 mg tablet Take 1 Tab by mouth daily. Qty: 30 Tab, Refills: 0  
  
ferrous sulfate 325 mg (65 mg iron) tablet Take 1 Tab by mouth daily (with breakfast). Qty: 30 Tab, Refills: 0 CONTINUE these medications which have NOT CHANGED Details  
tiotropium bromide (Spiriva Respimat) 2.5 mcg/actuation inhaler Take 2 Puffs by inhalation daily as needed. montelukast (Singulair) 10 mg tablet Take 10 mg by mouth daily. sAXagliptin (ONGLYZA) 5 mg tab tablet Take 5 mg by mouth daily. metoprolol tartrate (LOPRESSOR) 25 mg tablet Take 25 mg by mouth daily as needed (For systolic >954). acetaminophen (TYLENOL) 325 mg tablet Take 1 Tab by mouth every four (4) hours as needed for Pain. Qty: 30 Tab, Refills: 0  
  
magnesium oxide (MAG-OX) 400 mg tablet Take 400 mg by mouth nightly. potassium chloride SR (KLOR-CON 10) 10 mEq tablet Take 10 mEq by mouth two (2) times a day. predniSONE (DELTASONE) 10 mg tablet Take 10 mg by mouth two (2) times daily (with meals).   
  
Omeprazole delayed release (PRILOSEC D/R) 20 mg tablet Take 20 mg by mouth two (2) times a day. ibandronate (BONIVA) 150 mg tablet Take 150 mg by mouth every thirty (30) days. promethazine (PHENERGAN) 25 mg tablet Take 25 mg by mouth every eight (8) hours as needed for Nausea (Nausea not relieved by ondansetron). mirtazapine (REMERON) 15 mg tablet Take 15 mg by mouth nightly. albuterol (PROVENTIL VENTOLIN) 2.5 mg /3 mL (0.083 %) nebulizer solution 2.5 mg by Nebulization route every six (6) hours as needed for Wheezing or Shortness of Breath. albuterol (PROAIR HFA) 90 mcg/actuation inhaler Take 2 Puffs by inhalation every four (4) hours as needed. lactobacillus rhamnosus gg 10 billion cell (CULTURELLE) 10 billion cell capsule Take 1 Cap by mouth daily. biotin 10,000 mcg cap Take 1 Cap by mouth daily. DULoxetine (CYMBALTA) 60 mg capsule Take 60 mg by mouth daily. cholecalciferol, vitamin D3, (Vitamin D3) 50 mcg (2,000 unit) tab Take 2,000 Units by mouth daily. azelastine-fluticasone (DYMISTA) 137-50 mcg/spray spry 2 Sprays by Nasal route daily as needed (allergies). mometasone-formoterol (DULERA) 200-5 mcg/actuation HFA inhaler Take 2 Puffs by inhalation two (2) times daily as needed. STOP taking these medications  
  
 traMADoL (ULTRAM) 50 mg tablet Comments:  
Reason for Stopping:   
   
 aspirin delayed-release 81 mg tablet Comments:  
Reason for Stopping:   
   
 apixaban (ELIQUIS) 5 mg tablet Comments:  
Reason for Stopping: Follow up Care: 1. Mireya Schaefer MD with in 1 weeks 2. specialists as directed. Diet:  Diabetic Diet Disposition: 
SNF. Advanced Directive: 
Discharge Exam: [See today's progress note.] CONSULTATIONS: Cardiology, Pulmonary/Intensive care and GI Significant Diagnostic Studies:  
Recent Labs 10/12/20 
3671 10/11/20 
2775 WBC 12.4* 13.3* HGB 8.8* 8.6* HCT 32.5* 31.1*  
* 348 Recent Labs 10/12/20 
5218 10/11/20 
7045 10/10/20 
0411  141 141 K 3.9 3.9 4.2 * 108 108 CO2 30 29 30 BUN 17 19 21* CREA 1.03* 0.90 1.00 * 173* 204* CA 7.9* 7.7* 7.4* Recent Labs 10/12/20 
5429 10/11/20 
7740 10/10/20 
0411 ALT 27 21 23 * 270* 263* TBILI 0.7 0.8 0.8 TP 5.2* 5.1* 5.2* ALB 2.2* 2.1* 2.1*  
GLOB 3.0 3.0 3.1 Lab Results Component Value Date/Time Glucose (POC) 128 (H) 10/12/2020 11:44 AM  
 Glucose (POC) 145 (H) 10/12/2020 06:49 AM  
 Glucose (POC) 187 (H) 10/11/2020 09:28 PM  
 Glucose (POC) 181 (H) 10/11/2020 04:35 PM  
 Glucose (POC) 119 (H) 10/11/2020 11:35 AM  
 
Lab Results Component Value Date/Time TSH 0.58 11/13/2018 03:26 PM  
 
 
HOSPITAL COURSE & TREATMENT RENDERED:  
1. See today's progress note: 
Daily Progress Note and Discharge Note: 10/12/2020 Ana Rowell PCP: 
Link Jones MD 
   
Assessment/Plan:  
Acute Syncopal Episode - likely due to low BP from anemia- resolved 
- monitor/tx as needed 
- monitor Hb at least twice weekly at [facility] 
  
Hypotension - resolved 
- off pressors 10/2 
  
CAD- stable 
  
CHF 
- Diuretics as needed 
  
Chronic A-fib with acute exacerbations of RVR 
- Holding Eliquis due to severe anemia and undiagnosed site of bleeding - Cards consulted - Metoprolol BID 
  
Acute on chronic anemia, with hemorrhagic shock 10/1/20- resolved - Transfuse as needed - GI consulted - colonoscopy and EGD refused by pt - PPI 
- monitor at [facility] 
  
Leukocytosis: ddx chronic leg wounds, atelectasis, steroids 
- added keflex 
- encouraged IS 
- wean steroids to lowest dose possible at SNF/rehab 
  
DM2- trend sugars 
- SSI inpt 
- onglyza outpt 
  
HAYES - CPAP  
  
Depression/Anxiety - Remeron and Cymbata 
  
Nondisplaced acute fracture L3 superior endplate. 
- kyphoplasty done10/8 by IR 
- Ortho has seen 
-PT, SNF rehab 
   
  
Problem List: 
          
Problem List as of 10/12/2020 Date Reviewed: 5/29/2020  
        Codes Class Noted - Resolved   Syncope and collapse ICD-10-CM: R55 
ICD-9-CM: 780. 2   9/29/2020 - Present  
     
  Cellulitis of lower extremity ICD-10-CM: L03.119 ICD-9-CM: 689. 6   3/23/2020 - Present  
     
  ASO (arteriosclerosis obliterans) ICD-10-CM: I70.90 ICD-9-CM: 440. 9   9/5/2019 - Present  
     
  PVD (peripheral vascular disease) ([facility] ICD-10-CM: I73.9 ICD-9-CM: 443. 9   8/1/2019 - Present  
     
  Severe obesity ([facility] ICD-10-CM: E66.01 
ICD-9-CM: 278.01   7/30/2019 - Present  
     
  UTI (urinary tract infection) ICD-10-CM: N39.0 ICD-9-CM: 599.0   7/13/2019 - Present  
     
  COPD (chronic obstructive pulmonary disease) ([facility]) ICD-10-CM: J44.9 ICD-9-CM: 496   7/13/2019 - Present  
     
  Sepsis ([facility].) ICD-10-CM: A41.9 ICD-9-CM: 038.9, 995.91   7/13/2019 - Present  
     
  Normocytic anemia ICD-10-CM: D64.9 ICD-9-CM: 163. 9   7/13/2019 - Present  
     
  PAD (peripheral artery disease) ([facility]) ICD-10-CM: I73.9 ICD-9-CM: 443. 9   7/13/2019 - Present  
     
  Mild intermittent asthma ICD-10-CM: J45.20 ICD-9-CM: 493.90   5/17/2019 - Present  
     
  Gangrene of finger of right hand ([facility] ICD-10-CM: U87 
ICD-9-CM: 785. 4   5/17/2019 - Present  
     
  Brachial artery thrombus (HCC) ICD-10-CM: L62.6 ICD-9-CM: 444.21   4/26/2019 - Present  
     
  Sleep apnea ICD-10-CM: G47.30 ICD-9-CM: 780.57   1/5/2019 - Present  
  Overview Signed 1/5/2019  8:41 PM by Chas Apple, DO  
    wears CPAP 
   
  
     
  Atrial fibrillation ([address] ICD-10-CM: I48.91 
ICD-9-CM: 427.31   11/16/2018 - Present  
     
  Obesity (BMI 30-39.9) (Chronic) ICD-10-CM: Z96.7 ICD-9-CM: 278.00   11/13/2018 - Present  
     
  Type 2 diabetes with nephropathy ([facility] ICD-10-CM: E11.21 
ICD-9-CM: 250.40, 583.81   10/1/2018 - Present  
     
  Recurrent deep vein thrombosis (DVT) (HCC) ICD-10-CM: I82.409 ICD-9-CM: 453.40   3/30/2018 - Present  
     
  Chronic anticoagulation (Chronic) ICD-10-CM: Z79.01 
ICD-9-CM: V58.61   3/30/2018 - Present  
     
   Coronary artery disease involving native coronary artery without angina pectoris (Chronic) ICD-10-CM: I25.10 ICD-9-CM: 414.01   1/22/2016 - Present  
     
  Essential hypertension (Chronic) ICD-10-CM: I10 
ICD-9-CM: 814. 9   1/22/2016 - Present  
     
  CAD (coronary artery disease) ICD-10-CM: I25.10 ICD-9-CM: 414.00   10/9/2015 - Present  
     
  Type II diabetes mellitus (HCC) (Chronic) ICD-10-CM: E11.9 ICD-9-CM: 250.00   10/9/2015 - Present  
     
  RESOLVED: Cellulitis ICD-10-CM: L03.90 ICD-9-CM: 695. 9   3/23/2020 - 5/29/2020  
     
  RESOLVED: Hypokalemia ICD-10-CM: E87.6 ICD-9-CM: 276.8   3/23/2020 - 5/29/2020  
     
  RESOLVED: Hyponatremia ICD-10-CM: E87.1 ICD-9-CM: 276.1   3/23/2020 - 5/29/2020  
     
  RESOLVED: Diarrhea ICD-10-CM: R19.7 ICD-9-CM: 787.91   3/23/2020 - 5/29/2020  
     
  RESOLVED: Syncope and collapse ICD-10-CM: R55 
ICD-9-CM: 780. 2   7/13/2019 - 5/29/2020  
     
  RESOLVED: Leg wound, left ICD-10-CM: U85.044J ICD-9-CM: 891.0   7/13/2019 - 5/29/2020  
     
  RESOLVED: Leg laceration, right, initial encounter ICD-10-CM: Q05.189N ICD-9-CM: 894.0   7/13/2019 - 5/29/2020  
     
  RESOLVED: Cellulitis of right upper arm ICD-10-CM: L03.113 ICD-9-CM: 232. 3   5/17/2019 - 5/29/2020  
     
  RESOLVED: Bowel obstruction ([facility] ICD-10-CM: Z29.911 ICD-9-CM: 560.9   1/8/2019 - 5/29/2020  
     
  RESOLVED: Partial small bowel obstruction ([facility] ICD-10-CM: K56.600 ICD-9-CM: 560.9   1/5/2019 - 5/29/2020  
     
  RESOLVED: Dyspnea ICD-10-CM: R06.00 
ICD-9-CM: 786.09   11/13/2018 - 5/29/2020  
     
  RESOLVED: ARF (acute renal failure) ([facility]) ICD-10-CM: N17.9 ICD-9-CM: 644. 9   11/13/2018 - 5/29/2020  
     
  RESOLVED: Closed wedge compression fracture of T7 vertebra (HCC) ICD-10-CM: Q14.339V ICD-9-CM: 381. 2   11/13/2018 - 5/29/2020  
     
  RESOLVED: Chest pain ICD-10-CM: R07.9 ICD-9-CM: 786.50   6/27/2018 - 5/29/2020  
     
  RESOLVED: Abdominal pain ICD-10-CM: R10.9 ICD-9-CM: 789.00   6/27/2018 - 5/29/2020  
     
  RESOLVED: HTN (hypertension) ICD-10-CM: I10 
ICD-9-CM: 632. 9   10/9/2015 - 1/22/2016  
     
  RESOLVED: NSTEMI (non-ST elevated myocardial infarction) ([facility] ICD-10-CM: I21.4 ICD-9-CM: 410.70   10/9/2015 - 5/29/2020  
  
HPI:  
42-year-old female with multiple medical problems including obesity, sleep apnea, non-ST-elevation myocardial infarction, primary hypertension, congestive heart failure, gastroesophageal reflux disease, type 2 diabetes mellitus, temporal arteritis, fibromyalgia, atrial fibrillation and asthma, was brought to the emergency room from home for complaints of lightheadedness, dizziness with subsequent fall and low back pain.  Patient stated that whilst preparing some dinner in the evening hours of 09/29/2020, had the onset of symptoms with subsequent fall in a sitting position. Thereafter, patient noted persistent low back pain. Patient denied any recent changes in medication.  Denied headaches, visual deficits, chest pain, palpitations, sore throat, cough, shortness of breath, nausea, vomiting, fevers, chills, abdominal pain, bladder or bowel irregularities. (Dr Michelle Lee) 
  
9/30: She is c/o pain in her back. Meds are helping. Hb 5.1 and she has been transfused with 2 units. AM labs pending and she will need a line as she is a difficult stick. May need more blood. She reports her stools have been dark so will ask GI to see. Holding Eliquis. She is on chronic steroids due to TA.  
  
10/1:  Heart rate up to 180s this AM - seems to have spiked up due to back pain but now back in 110s. No other complaint except back pain. No chest pain or SOB. Hb 7.1 this AM - watching. GI planning colonoscopy - starting prep.  Poss kyphoplasty when more stable.   
  
10/2:  Reports she feels \"just bad all over\" this AM.  Last night became hypotensive, dropped Hb again, required another transfusion of 1 unit, and Roland had to be started to maintain BP - remains on Roland this AM.  Also mult episodes of RVR of her A fib - Cards treating and Dilt drip started. Transferred to ICU. Hb up to 8.8 this AM.  Tmax 99. WBC up a little from previous -watching. 10/3:  Feeling much better this AM and sitting up eating breakfast.  Back in sinus rhythm with rate in 80s at this time. BP better. Off pressors. Off IV dilt today and on BB. Hb 8.0. Hopefully out of ICU later today or tomorrow.   
  
10/4:  Continues to feel better. BP ok. Out of ICU today.   
  
10/5:  Little change from yesterday. No new complaints. Still c/o back pain with movement. She may be stable enough for the GI work up and for Kyphoplasty - per GI and IR. Hb 8.0. 
  
10/6:  AM labs pending. Still c/o pain. Holding anticoag. More fatigued.  
  
10/7:  Still c/o back pain with movement. She would like to have the kyphoplasty done prior to going to rehab. Anticoags have been held and Hb has been stable last few days so perhaps IR can do the kyphoplasty - will consult them. Covid testing for SNF being done.  
  
10/8:  Little change except in better spirits today. Kyphoplasty planned for today. Covid testing for SNF pending as of this AM.   
  
10/9:  Marked improvement in back pain with Kyphoplasty done yesterday. No back pain at all this AM so far. She wants to do more with PT/OT today. 2nd Covid negative. Stable for rehab.   
  
10/10:  Pt feels tired today. [facility] has accepted her, but her COIVD tests are too old. Needs 2 more. Tolerating PO, +BM yesterday. Wants to get up with therapy again today. Back pain continues to be improved. 
  
10/11:  Sugars are borderline, but just restarted onglyza yesterday. WBC up, afebrile. Denies cough, dysuria, increased pain in legs. Ddx for this is either leg wounds or atelectasis- encouraged IS. Tolerating PO.  +BM.   Zoila Galavizs to go to SNF when available. 
  
 10/12:  No complaints. Back is better. Afeb. WBC up likely due to Prednisone. Stable for rehab when bed ready. 130PM:  Accepted at [facility] for rehab. At rehab, check Hb frequently. Discussed resumption of anticoagulation, risks v benefits. With undiagnosed source of bleeding, and severe hypotension/shock from blood loss earlier in admission, will hold anticoagulation until she proves to be stable for another week and then readdress possibility of start of NOAC. If stable, anticoagulation could be restarted at rehab. Pt agrees with this approach. Her DM will also need to be titrated at rehab with meds added as needed. Prednisone could be weaned further at rehab.   
  
Review of Systems: A comprehensive review of systems was negative except for that written in the HPI. 
  
Objective:  
Physical Exam:  
Visit Vitals BP (!) 156/78 (BP 1 Location: Left arm, BP Patient Position: At rest) Pulse 74 Temp 98 °F (36.7 °C) Resp 16 Ht 5' 7\" (1.702 m) Wt 116 kg (255 lb 11.2 oz) SpO2 96% BMI 40.05 kg/m² O2 Flow Rate (L/min): 3 l/min O2 Device: Nasal cannula Temp (24hrs), Av.4 °F (36.9 °C), Min:98 °F (36.7 °C), Max:98.6 °F (37 °C) 
  10/11 1901 - 10/12 0700 In: 120 [P.O.:120] Out: -    10/10 07 - 10/11 1900 In: 360 [P.O.:360] Out: 1100 [Urine:1100] General:  Alert, cooperative, no distress, obese Head:  Normocephalic, without obvious abnormality, atraumatic. Eyes:  Conjunctivae/corneas clear. PERRL, EOMs intact. Throat: Lips, mucosa, and tongue normal. Teeth and gums normal.  
Neck: Supple, symmetrical, trachea midline, no adenopathy, thyroid: no enlargement/tenderness/nodules, no carotid bruit and no JVD. Lungs:   Clear to auscultation bilaterally. Chest wall:  No tenderness or deformity. Heart:  Currently in sinus rhythm with rate ok, no murmur, click, rub or gallop. Abdomen:   Soft, non-tender. Bowel sounds normal. No masses,  No organomegaly. Extremities: Extremities with weeping and swelling, erythematous, dressed. Less edema. No calf tenderness or cords. Multiple partially amputated fingers R hand Pulses: 1+ and symmetric all extremities. Skin: Skin color, texture, turgor normal.   
Neurologic:   Alert and oriented X 3. Fine motor of hands and fingers normal.   equal.  Gait not tested at this time. Sensation grossly normal to touch. Gross motor of extremities normal.    
  
Data Review:  
CT Head IMPRESSION: No acute findings.   
  
EXAM:  CT CERVICAL SPINE WITHOUT CONTRAST INDICATION: fall w head injury. COMPARISON: None. CONTRAST:  None. FINDINGS: 
Alignment is satisfactory, no acute fracture or dislocation. Moderate spondylosis and disc degeneration at C5-6 IMPRESSION 
 impression: No acute changes. 
  
CT LS spine Axial CT scan of the lumbar sacral spine obtained without contrast with coronal 
and sagittal reconstructions. No prior. CT dose reduction was achieved through 
the use of a standardized protocol tailored for this examination and automatic 
exposure control for dose modulation . Harley Tasha There is a minimally displaced fracture of the superior endplate of L3 without 
significant canal compromise. There is some mild diffuse spondylosis and disc degeneration. IMPRESSION 
 impression: Nondisplaced acute fracture L3 superior endplate. 
  
ECHO 3/70/36 Interpretation Summary Result status: Final result · LV: Estimated LVEF is 60 - 65%. Visually measured ejection fraction. Normal cavity size and systolic function (ejection fraction normal). Mild concentric hypertrophy. Wall motion: normal. 
· AV: With no evidence of reduced excursion.  
  
Kyphoplasty L3 10/8/20 Procedure: 1. Fluoroscopy guided placement of cannulas into the bilateral L3 pedicles. 2. Balloon kyphoplasty of the L3 vertebral body with cement augmentation. 3. Sedation provided by the anesthesia department. FLUOROSCOPY TIME: 8.0 min FLUOROSCOPY DOSE: 1382 mGy Body: 
Following discussion of risks, benefits and alternatives, informed written 
consent was obtained. The patient was placed prone on the fluoroscopic exam table and prepped and 
draped in sterile fashion. The correct level was identified fluoroscopically. 1% lidocaine was administered to the subcutaneous tissues for local anesthesia, 
and 0.25% bupivacaine was administered into the periosteum of of the bilateral 
pedicles under fluoroscopic guidance using a 22-gauge 10 cm spinal needle for 
local anesthesia. Under biplane fluoroscopic guidance, the left L3 pedicle was 
traversed with a Kyphon cannula. The tip of the cannula was placed just within 
the vertebral body. A drill was then advanced to within the anterior portion of 
the vertebral body. This procedure was repeated on the right L3 pedicle. Into each cannula, a kyphoplasty balloon was inserted. Both balloons were 
inflated with to a pressure of approximately 200 PSI towards the anterior part 
of the vertebral body. The balloons were deflated and retracted slightly, just 
underneath the deformed portion of the superior endplate and reinflated slowly. There is slight reduction of the deformity. The right balloon was then removed, 
the left balloons remained in place and inflated while cement was injected into 
the right cannula under live fluoroscopic examination. The left-sided balloon 
was then deflated and removed, while the cement was injected into the left 
cannula under live fluoroscopic guidance. The devices were removed and a 
dressing was applied. There were no immediate complications. There was a small 
amount of extravasation of cement into some small paravertebral venous branches, 
however this was self-limited and there was no distal cement embolization. A 
small amount cement was also noted under live fluoroscopy examination to track 
along the right pedicle access tract. Impression: Kyphoplasty of the L3 vertebral body.  
 
Signed: 
Jatinder Rivas MD 
10/12/2020 
2:35 PM

## 2020-10-12 NOTE — PROGRESS NOTES
10/12/2020   CM ADDENDUM: Pt has been accepted to [facility] and a skilled bed is available today. RN, please call report to [address] [place] [address] at [phone]. [facility] has been arranged for 4 p.m. Transition of Care Plan to SNF/Rehab 
 
SNF/Rehab Transition: 
Patient has been accepted to [address] [place] [address] and meets criteria for admission. Patient will transported by [facility] and expected to leave at 4 p.m. Communication to Patient/Family: 
Patient (identified care giver) and they are agreeable to the transition plan. Communication to SNF/Rehab: 
Bedside RN, Katie Romero, has been notified to update the transition plan to the facility and call report (phone number [phone]). Discharge information has been updated on the AVS. Discharge instructions to be fax'd to facility at [facility] # [phone]). Patient has been identified as part of the [employer].  For Care Coordination associated with that Bundle Program, please contact Bundle information has been communication to 
 
Nursing Please include all hard scripts for controlled substances, med rec and dc summary, and AVS in packet. Reviewed and confirmed with facility, [facility], can manage the patient care needs for the following: SNF/Rehab Transition: 
Patient to follow-up with Home Health: [facility], other  ,none) PCP/Specialist: Dr. Meera Lindsay Reviewed and confirmed with facility, [address] [place] [address] they can manage the patient care needs for the following:  
 
Renny Dakins with (X) only those applicable: 
 
Medication: 
[]  Medications will be available at the facility []  IV Antibiotics N/A 
[]  Controlled Substance - hard copy to be sent with patient  
[]  Weekly Labs Documents: 
[] Hard RX [x] MAR 
[] Kardex [x] AVS 
[x]Transfer Summary []Discharge Equipment: 
[]  CPAP/BiPAP []  Wound Vacuum 
[]  Bahena or Urinary Device 
[]  PICC/Central Line 
[]  Nebulizer 
[]  Ventilator Treatment: 
[]Isolation (for MRSA, VRE, etc.) []Surgical Drain Management []Tracheostomy Care 
[]Dressing Changes []Dialysis with transportation and chair time N/A 
[]PEG Care []Oxygen []Daily Weights for Heart Failure Dietary: 
[]Any diet limitations []Tube Feedings []Total Parenteral Management (TPN) Eligible for Medicaid Long Term Services and Supports Yes: 
[] Eligible for medical assistance or will become eligible within 180 days and UAI completed. [] Provider/Patient and/or support system has requested screening. [] UAI copy provided to patient or responsible party, N/A 
[] UAI unavailable at discharge will send once processed to SNF provider. [] UAI unavailable at discharged mailed to patient No:  
[] Private pay and is not financially eligible for Medicaid within the next 180 days. [] Reside out-of-state. [] A residents of a state owned/operated facility that is licensed 
by [address] or [facility] 
[] Enrollment in [facility] hospice services 
[] [address] 
[] Patient /Family declines to have screening completed or provide financial information for screening Financial Resources: 
Medicaid   
[] Initiated and application pending  
[] Full coverage Advanced Care Plan: 
[]Surrogate Decision Maker of Care 
[]POA []Communicated Code Status Full (DDNR\", \"Full\") Other Financial Resources: 
Medicaid   
[] Initiated and application pending  
[] Full coverage Advanced Care Plan: 
[]Surrogate Decision Maker of Care 
[]POA []Communicated Code Status Full (DDNR\", \"Full\") Other COVID 19 tests for placement are negative on 10/10 and 10/11 TENISHA Gar

## 2020-10-23 NOTE — PROGRESS NOTES
Transition of care outreach postponed for 7 days due to patient's remains inpatient rehab facility. [address] [facility] [phone].

## 2020-11-02 PROBLEM — E11.21 TYPE 2 DIABETES WITH NEPHROPATHY (HCC): Status: RESOLVED | Noted: 2018-10-01 | Resolved: 2020-01-01

## 2020-11-02 PROBLEM — N39.0 UTI (URINARY TRACT INFECTION): Status: RESOLVED | Noted: 2019-07-13 | Resolved: 2020-01-01

## 2020-11-02 PROBLEM — J96.11 CHRONIC RESPIRATORY FAILURE WITH HYPOXIA (HCC): Status: ACTIVE | Noted: 2020-01-01

## 2020-11-02 PROBLEM — R55 SYNCOPE AND COLLAPSE: Status: RESOLVED | Noted: 2020-01-01 | Resolved: 2020-01-01

## 2020-11-02 PROBLEM — I96 GANGRENE OF FINGER OF RIGHT HAND (HCC): Status: RESOLVED | Noted: 2019-05-17 | Resolved: 2020-01-01

## 2020-11-02 NOTE — ED PROVIDER NOTES
22-year-old female with history of atrial fibrillation, fibromyalgia, coronary artery disease, diabetes, DVT on Eliquis, GERD, heart failure, hypertension, NSTEMI, and obesity has been managed by the wound center at [facility] for left knee infection has been present for approximately 1 year, but has not shown any improvement over the last few weeks on doxycycline and Augmentin. She was sent for an outpatient CT scan of the leg because she has had an increased amount of drainage and, before she could go back to her facility, the radiologist called and said she needed evaluation in the emergency department. She got the impression they were worried her infection was worse and would need IV antibiotics and admission to the hospital.  No fever, no nausea, no vomiting. No chest pain or trouble breathing. Past Medical History:  
Diagnosis Date  Asthma  Atrial fibrillation ([address] 11/16/2018  Autoimmune disease ([address]   
 fibromyalgia  CAD (coronary artery disease) 10/9/2015  Closed wedge compression fracture of T7 vertebra ([address] 11/13/2018  Diabetes ([address]  DVT (deep vein thrombosis) in pregnancy  GERD (gastroesophageal reflux disease)  Heart failure ([address]  History of vascular access device 09/30/2020  
 4f midline, 12cm at 1cm out placed in L Cephalic by Carmel Lanza RN  
 HTN (hypertension)  NSTEMI (non-ST elevated myocardial infarction) ([address] 10/9/2015  Obesity (BMI 30-39.9) 11/13/2018  Sleep apnea   
 wears CPAP Past Surgical History:  
Procedure Laterality Date  ABDOMEN SURGERY PROC UNLISTED    
 hital hernia repair, gall bladder removed  AMPUTATION TRANSMETACARPAL Right 06/12/2019  
 entire ring finger, 2nd & 3rd fingers (transmetacarpal)  BREAST SURGERY PROCEDURE UNLISTED    
 lumpectomy  CARDIAC SURG PROCEDURE UNLIST  10/9/15  
 2 stents [place]  HX COLOSTOMY    
 and reversal, post episiotomy repair [address]  HX UROLOGICAL  2009  
 bladder sling  IR KYPHOPLASTY LUMBAR  10/8/2020 Family History:  
Problem Relation Age of Onset  Diabetes Mother  Heart Failure Mother  Kidney Disease Mother  Diabetes Father  Heart Disease Father  Elevated Lipids Father  Heart Failure Father  Heart Disease Brother  Cancer Brother   
     kidney  Diabetes Brother  No Known Problems Brother  No Known Problems Brother Social History Socioeconomic History  Marital status:  Spouse name: Not on file  Number of children: Not on file  Years of education: Not on file  Highest education level: Not on file Occupational History  Not on file Social Needs  Financial resource strain: Not on file  Food insecurity Worry: Not on file Inability: Not on file  Transportation needs Medical: Not on file Non-medical: Not on file Tobacco Use  Smoking status: Former Smoker Last attempt to quit: 3/30/2017 Years since quitting: 3.5  Smokeless tobacco: Never Used Substance and Sexual Activity  Alcohol use: No  
  Alcohol/week: 0.0 standard drinks Comment: very very rarely  Drug use: No  
 Sexual activity: Never Lifestyle  Physical activity Days per week: Not on file Minutes per session: Not on file  Stress: Not on file Relationships  Social connections Talks on phone: Not on file Gets together: Not on file Attends Christian service: Not on file Active member of club or organization: Not on file Attends meetings of clubs or organizations: Not on file Relationship status: Not on file  Intimate partner violence Fear of current or ex partner: Not on file Emotionally abused: Not on file Physically abused: Not on file Forced sexual activity: Not on file Other Topics Concern [address] Service Not Asked  Blood Transfusions Not Asked  Caffeine Concern Not Asked  Occupational Exposure Not Asked Stevphen McCulloch Hazards Not Asked  Sleep Concern Not Asked  Stress Concern Not Asked  Weight Concern Not Asked  Special Diet Not Asked  Back Care Not Asked  Exercise Not Asked  Bike Helmet Not Asked  Seat Belt Not Asked  Self-Exams Not Asked Social History Narrative  Not on file ALLERGIES: Advair diskus [fluticasone propion-salmeterol]; Aspartame; Breo ellipta [fluticasone furoate-vilanterol]; Bumex [bumetanide]; Ciprofloxacin; Statins-hmg-coa reductase inhibitors; Sulfa (sulfonamide antibiotics); Tetracycline; Torsemide; and Zetia [ezetimibe] Review of Systems Constitutional: Negative for fever. HENT: Negative for trouble swallowing. Eyes: Negative for visual disturbance. Respiratory: Negative for cough. Cardiovascular: Negative for chest pain. Gastrointestinal: Negative for abdominal pain. Genitourinary: Negative for difficulty urinating. Musculoskeletal: Negative for gait problem. Skin: Positive for wound. Negative for rash. Neurological: Negative for headaches. Hematological: Bruises/bleeds easily. Psychiatric/Behavioral: Negative for sleep disturbance. Vitals:  
 11/02/20 1346 BP: 136/62 Pulse: 92 Resp: 16 Temp: 99.1 °F (37.3 °C) Weight: 115.7 kg (255 lb) Physical Exam 
Constitutional:   
   Appearance: Normal appearance. HENT:  
   Head: Normocephalic. Nose: Nose normal.  
   Mouth/Throat:  
   Mouth: Mucous membranes are moist.  
Eyes:  
   Extraocular Movements: Extraocular movements intact. Conjunctiva/sclera: Conjunctivae normal.  
Cardiovascular:  
   Rate and Rhythm: Normal rate. Comments: Capillary refill 3 to 4 seconds bilateral lower extremity Pulmonary:  
   Effort: Pulmonary effort is normal. No respiratory distress. Abdominal:  
   General: Abdomen is flat. Musculoskeletal: Normal range of motion. Right lower le+ Pitting Edema present. Left lower le+ Pitting Edema present. Skin: 
   Findings: Wound present. No rash. Comments: Wound to the left knee is dressed and has some light brown/clear drainage. Minimal tenderness to the knee Neurological:  
   General: No focal deficit present. Mental Status: She is alert. Psychiatric:     
   Behavior: Behavior normal.  
 
  
 
MDM Number of Diagnoses or Management Options Cellulitis of left lower extremity:  
PVD (peripheral vascular disease) [facility]):  
Diagnosis management comments: Increased drainage from a chronic left knee wound that seems to be failing outpatient management. Plan for labs, cultures, Vanco and Zosyn and admission. Her lab work had not come back at the time of shift change, so I signed her over to my colleague, Dr. Beatrice Hunt. Procedures

## 2020-11-02 NOTE — ED TRIAGE NOTES
Patient from CT post scan and from [facility] for left knee infection oozing and pain. On AB 2 weeks and seen would care. On home O2 x 3 L POA.

## 2020-11-03 PROBLEM — A41.9 SEPSIS (HCC): Status: RESOLVED | Noted: 2019-07-13 | Resolved: 2020-01-01

## 2020-11-03 PROBLEM — D72.829 LEUKOCYTOSIS: Status: ACTIVE | Noted: 2020-01-01

## 2020-11-03 NOTE — PROGRESS NOTES
Problem: Mobility Impaired (Adult and Pediatric) Goal: *Acute Goals and Plan of Care (Insert Text) Description: FUNCTIONAL STATUS PRIOR TO ADMISSION: Patient required minimal assistance for basic and instrumental ADLs. HOME SUPPORT PRIOR TO ADMISSION: The patient was in rehab. Physical Therapy Goals Initiated 11/3/2020 1. Patient will move from supine to sit and sit to supine , scoot up and down, and roll side to side in bed with modified independence within 7 day(s). 2.  Patient will transfer from bed to chair and chair to bed with modified independence using the least restrictive device within 7 day(s). 3.  Patient will perform sit to stand with modified independence within 7 day(s). 4.  Patient will ambulate with modified independence for 50 feet with the least restrictive device within 7 day(s). Outcome: Progressing Towards Goal 
 PHYSICAL THERAPY EVALUATION Patient: Mady Roman (87 y.o. female) Date: 11/3/2020 Primary Diagnosis: Sepsis ([address] [A41.9] Precautions: fall ASSESSMENT Based on the objective data described below, the patient presents with difficulty with ambulation and generalized weakness. Patient was in rehab getting therapy prior coming here. Communicated with nurse cleared for therapy. Sit on the edge of bed min assist, sit to stand min assist, ambulate with rolling walker in the room and around the bed min assist. OOB to chair as tolerated performed some active range of motion exercise on both LE all planes. Educate and  Instructed patient to continue active range of motion exercise on both legs while up on chair or on bed. Communicated with nurse and agreed to monitor patient. Current Level of Function Impacting Discharge (mobility/balance): min assist with ambulation and transfers using a rolling walker Functional Outcome Measure: The patient scored 55/100 on the barthel index outcome measure . Other factors to consider for discharge: fall Patient will benefit from skilled therapy intervention to address the above noted impairments. PLAN : 
Recommendations and Planned Interventions: bed mobility training, transfer training, gait training, therapeutic exercises, neuromuscular re-education, orthotic/prosthetic training, patient and family training/education, and therapeutic activities Frequency/Duration: Patient will be followed by physical therapy:  5 times a week to address goals. Recommendation for discharge: (in order for the patient to meet his/her long term goals) Therapy 3 hours per day 5-7 days per week This discharge recommendation: 
Has been made in collaboration with the attending provider and/or case management IF patient discharges home will need the following DME: patient owns DME required for discharge SUBJECTIVE:  
Patient stated ok.  OBJECTIVE DATA SUMMARY:  
HISTORY:   
Past Medical History:  
Diagnosis Date Asthma Atrial fibrillation ([facility] 11/16/2018 Autoimmune disease ([facility]   
 fibromyalgia CAD (coronary artery disease) 10/9/2015 Closed wedge compression fracture of T7 vertebra ([facility] 11/13/2018 Diabetes ([facility] DVT (deep vein thrombosis) in pregnancy GERD (gastroesophageal reflux disease) Heart failure ([facility] History of vascular access device 09/30/2020  
 4f midline, 12cm at 1cm out placed in L Cephalic by Taylor Villalobos RN  
 HTN (hypertension) NSTEMI (non-ST elevated myocardial infarction) ([facility] 10/9/2015 Obesity (BMI 30-39.9) 11/13/2018 Sleep apnea   
 wears CPAP Past Surgical History:  
Procedure Laterality Date ABDOMEN SURGERY PROC UNLISTED    
 hital hernia repair, gall bladder removed AMPUTATION TRANSMETACARPAL Right 06/12/2019  
 entire ring finger, 2nd & 3rd fingers (transmetacarpal) BREAST SURGERY PROCEDURE UNLISTED    
 lumpectomy CARDIAC SURG PROCEDURE UNLIST  10/9/15  
 2 stents [facility] HX COLOSTOMY    
 and reversal, post episiotomy repair [address] HX UROLOGICAL  2009  
 bladder sling IR KYPHOPLASTY LUMBAR  10/8/2020 Personal factors and/or comorbidities impacting plan of care:  
 
Home Situation Home Environment: (P) Private residence # Steps to Enter: (P) 0 One/Two Story Residence: (P) One story Living Alone: (P) Yes Patient Expects to be Discharged to[de-identified] (P) Unknown Current DME Used/Available at Home: (P) Miriam , rollator, Walker, rolling, Wheelchair, Commode, bedside, Shower chair, Grab bars(lift recliner) Tub or Shower Type: (P) Shower EXAMINATION/PRESENTATION/DECISION MAKING:  
Critical Behavior: 
  
  
  
  
Hearing: Auditory Auditory Impairment: Hard of hearing, bilateral 
 
Range Of Motion: 
AROM: Within functional limits PROM: Within functional limits Strength:   
Strength: Generally decreased, functional 
  
  
  
  
  
  
Tone & Sensation:  
  
  
  
  
  
  
  
  
  
   
Coordination: 
Coordination: Within functional limits Vision:  
  
Functional Mobility: 
Bed Mobility: 
Rolling: Minimum assistance Supine to Sit: Minimum assistance Sit to Supine: Minimum assistance Scooting: Minimum assistance Transfers: 
Sit to Stand: Minimum assistance Stand to Sit: Minimum assistance Stand Pivot Transfers: Minimum assistance Bed to Chair: Minimum assistance Balance:  
Sitting: Intact; High guard Standing: Impaired; With support Standing - Static: Fair Standing - Dynamic : Fair Ambulation/Gait Training: 
Distance (ft): 20 Feet (ft) Assistive Device: Walker, rolling;Gait belt Ambulation - Level of Assistance: Minimal assistance Gait Description (WDL): Exceptions to [facility] Gait Abnormalities: Path deviations Base of Support: Widened Speed/Es: Fluctuations; Pace decreased (<100 feet/min) Therapeutic Exercises: Instructed patient to continue active range of motion exercise on both legs while up on chair or on bed. Functional Measure: 
Barthel Index: 
 
Bathin Bladder: 5 Bowels: 5 Groomin Dressin Feeding: 10 Mobility: 10 Stairs: 0 Toilet Use: 5 Transfer (Bed to Chair and Back): 10 Total: 55/100 The Barthel ADL Index: Guidelines 1. The index should be used as a record of what a patient does, not as a record of what a patient could do. 2. The main aim is to establish degree of independence from any help, physical or verbal, however minor and for whatever reason. 3. The need for supervision renders the patient not independent. 4. A patient's performance should be established using the best available evidence. Asking the patient, friends/relatives and nurses are the usual sources, but direct observation and common sense are also important. However direct testing is not needed. 5. Usually the patient's performance over the preceding 24-48 hours is important, but occasionally longer periods will be relevant. 6. Middle categories imply that the patient supplies over 50 per cent of the effort. 7. Use of aids to be independent is allowed. Irais Núñez., Barthel, D.W. ([id number]). Functional evaluation: the Barthel Index. [address] [facility] (14)2. Luis Enrique Barney belen DAMI Riley, Talon Almanza., Viviana Carr., [place], [address] [place] [address] (). Measuring the change indisability after inpatient rehabilitation; comparison of the responsiveness of the Barthel Index and Functional [place] Measure. Journal of Neurology, Neurosurgery, and Psychiatry, 66(4), 255-493. Isabella Echeverria, [email or web address], SANIYA Parry, & Dionne Ivey MALEYDA. (2004.) Assessment of post-stroke quality of life in cost-effectiveness studies: The usefulness of the Barthel Index and the EuroQoL-5D. [facility], 13, [phone] Physical Therapy Evaluation Charge Determination History Examination Presentation Decision-Making LOW Complexity : Zero comorbidities / personal factors that will impact the outcome / POC LOW Complexity : 1-2 Standardized tests and measures addressing body structure, function, activity limitation and / or participation in recreation  LOW Complexity : Stable, uncomplicated  Other outcome measures barthel  LOW Based on the above components, the patient evaluation is determined to be of the following complexity level: LOW Pain Ratin/10 Activity Tolerance:  
Good After treatment patient left in no apparent distress:  
Sitting in chair, Heels elevated for pressure relief, Call bell within reach, Bed / chair alarm activated, and Caregiver / family present COMMUNICATION/EDUCATION:  
The patients plan of care was discussed with: Occupational therapist, Registered nurse, and Case management. Fall prevention education was provided and the patient/caregiver indicated understanding. Thank you for this referral. 
Jasmyn Jessica PT,WCC. Time Calculation: 28 mins

## 2020-11-03 NOTE — PROGRESS NOTES
Daily Progress Note: 11/3/2020 Ana Jones MD 
 
Assessment/Plan:  
Cellulitis - reported in the LLE. Does have a very small wound over the left patellar region but not exquisitely warm or tender to touch. No expressible pus. CT above without abscess  
- f/u cultures  
- continue IV antibiotics though suspect can rapidly de-escalate  
- suspect drainage is more likely 2/2 extravascular fluid \"leaking\" from an opening (pt with lymphedema) rather than actual acute infection but difficult to determine as I have not visualized the fluid  
- will ask ortho to eval but DOUBT joint infection as ESR not markedly elevated and exam relatively benign and underwhelming  
  
CAD (coronary artery disease) (10/9/2015) - not on ASA, statin 
- continue metoprolol  
  
Type II diabetes mellitus ([address] (10/9/2015) - ISS  
- BG checks AC TID and qHS  
- resume home meds at discharge  
  
Essential hypertension (1/22/2016) - continue metoprolol 
  
Recurrent deep vein thrombosis (DVT) ([address] (3/30/2018) - on Eliquis  
  
Chronic anticoagulation (3/30/2018) - Eliquis for a-fib and h/o DVT  
  
Sleep apnea (1/5/2019) - CPAP qHS  
  
Atrial fibrillation ([address] (11/16/2018) - on digoxin and metoprolol  
- continue eliquis  
  
COPD (chronic obstructive pulmonary disease) ([address] (7/13/2019) - chronically on O2  
- continue nebs  
  
PAD (peripheral artery disease) (HCC) (7/13/2019)/PVD 
- check LE dopplers  
  Severe obesity ([address] (7/30/2019) - counseled on weight loss  
  
Chronic respiratory failure with hypoxia ([address] (11/2/2020) 
- on 3L  
  
  
 
Problem List: 
Problem List as of 11/3/2020 Date Reviewed: 11/2/2020 Codes Class Noted - Resolved Leukocytosis ICD-10-CM: Y50.702 ICD-9-CM: 288.60  11/3/2020 - Present Chronic respiratory failure with hypoxia [facility]) ICD-10-CM: J96.11 
ICD-9-CM: 518.83, 799.02  11/2/2020 - Present Overview Signed 11/2/2020 10:12 PM by Altagracia Cardenas MD  
  On 3L NC at home Cellulitis of lower extremity ICD-10-CM: L03.119 ICD-9-CM: 682.6  3/23/2020 - Present ASO (arteriosclerosis obliterans) ICD-10-CM: I70.90 ICD-9-CM: 440.9  9/5/2019 - Present PVD (peripheral vascular disease) ([facility] ICD-10-CM: I73.9 ICD-9-CM: 443.9  8/1/2019 - Present Severe obesity ([facility] ICD-10-CM: E66.01 
ICD-9-CM: 278.01  7/30/2019 - Present COPD (chronic obstructive pulmonary disease) (HCC) ICD-10-CM: J44.9 ICD-9-CM: 169  7/13/2019 - Present Normocytic anemia ICD-10-CM: D64.9 ICD-9-CM: 285.9  7/13/2019 - Present PAD (peripheral artery disease) (HCC) ICD-10-CM: I73.9 ICD-9-CM: 443.9  7/13/2019 - Present Mild intermittent asthma ICD-10-CM: J45.20 ICD-9-CM: 493.90  5/17/2019 - Present Brachial artery thrombus (HCC) ICD-10-CM: J03.9 ICD-9-CM: 444.21  4/26/2019 - Present Sleep apnea ICD-10-CM: G47.30 ICD-9-CM: 780.57  1/5/2019 - Present Overview Signed 1/5/2019  8:41 PM by Jenise Florentino DO  
  wears CPAP Atrial fibrillation [facility]) ICD-10-CM: I48.91 
ICD-9-CM: 427.31  11/16/2018 - Present Obesity (BMI 30-39.9) (Chronic) ICD-10-CM: K00.5 ICD-9-CM: 278.00  11/13/2018 - Present Recurrent deep vein thrombosis (DVT) (HCC) ICD-10-CM: I82.409 ICD-9-CM: 453.40  3/30/2018 - Present Chronic anticoagulation (Chronic) ICD-10-CM: Z79.01 
ICD-9-CM: V58.61  3/30/2018 - Present Essential hypertension (Chronic) ICD-10-CM: I10 
ICD-9-CM: 401.9  1/22/2016 - Present CAD (coronary artery disease) ICD-10-CM: I25.10 ICD-9-CM: 414.00  10/9/2015 - Present Type II diabetes mellitus (HCC) (Chronic) ICD-10-CM: E11.9 ICD-9-CM: 250.00  10/9/2015 - Present RESOLVED: Syncope and collapse ICD-10-CM: R55 
ICD-9-CM: 780.2  9/29/2020 - 11/2/2020 RESOLVED: Cellulitis ICD-10-CM: L03.90 ICD-9-CM: 682.9  3/23/2020 - 5/29/2020 RESOLVED: Hypokalemia ICD-10-CM: E87.6 ICD-9-CM: 276.8  3/23/2020 - 5/29/2020 RESOLVED: Hyponatremia ICD-10-CM: E87.1 ICD-9-CM: 276.1  3/23/2020 - 5/29/2020 RESOLVED: Diarrhea ICD-10-CM: R19.7 ICD-9-CM: 787.91  3/23/2020 - 5/29/2020 RESOLVED: Syncope and collapse ICD-10-CM: R55 
ICD-9-CM: 780.2  7/13/2019 - 5/29/2020 RESOLVED: UTI (urinary tract infection) ICD-10-CM: N39.0 ICD-9-CM: 599.0  7/13/2019 - 11/2/2020 * (Principal) RESOLVED: Sepsis ([facility] ICD-10-CM: A41.9 ICD-9-CM: 038.9, 995.91  7/13/2019 - 11/3/2020 RESOLVED: Leg wound, left ICD-10-CM: T69.362B ICD-9-CM: 891.0  7/13/2019 - 5/29/2020 RESOLVED: Leg laceration, right, initial encounter ICD-10-CM: G31.802G ICD-9-CM: 894.0  7/13/2019 - 5/29/2020 RESOLVED: Cellulitis of right upper arm ICD-10-CM: L03.113 ICD-9-CM: 682.3  5/17/2019 - 5/29/2020 RESOLVED: Gangrene of finger of right hand ([facility] ICD-10-CM: L04 
ICD-9-CM: 785.4  5/17/2019 - 11/2/2020 RESOLVED: Bowel obstruction ([facility] ICD-10-CM: R02.266 ICD-9-CM: 560.9  1/8/2019 - 5/29/2020 RESOLVED: Partial small bowel obstruction ([facility] ICD-10-CM: K56.600 ICD-9-CM: 560.9  1/5/2019 - 5/29/2020 RESOLVED: Dyspnea ICD-10-CM: R06.00 
ICD-9-CM: 786.09  11/13/2018 - 5/29/2020 RESOLVED: ARF (acute renal failure) (HCC) ICD-10-CM: N17.9 ICD-9-CM: 584.9  11/13/2018 - 5/29/2020 RESOLVED: Closed wedge compression fracture of T7 vertebra ([facility] ICD-10-CM: G45.718C ICD-9-CM: 805.2  11/13/2018 - 5/29/2020 RESOLVED: Type 2 diabetes with nephropathy ([facility] ICD-10-CM: E11.21 
ICD-9-CM: 250.40, 583.81  10/1/2018 - 11/2/2020 RESOLVED: Chest pain ICD-10-CM: R07.9 ICD-9-CM: 786.50  6/27/2018 - 5/29/2020 RESOLVED: Abdominal pain ICD-10-CM: R10.9 ICD-9-CM: 789.00  6/27/2018 - 5/29/2020  RESOLVED: Coronary artery disease involving native coronary artery without angina pectoris (Chronic) ICD-10-CM: I25.10 ICD-9-CM: 414.01  2016 - 2020 RESOLVED: HTN (hypertension) ICD-10-CM: I10 
ICD-9-CM: 401.9  10/9/2015 - 2016 RESOLVED: NSTEMI (non-ST elevated myocardial infarction) ([facility] ICD-10-CM: I21.4 ICD-9-CM: 410.70  10/9/2015 - 2020 HPI:  
Ms. Mendy Rain is a 69 y.o.  female with PMH of a-fib, CAD, DM, asthma, h/p DVT admitted for LLE \"cellulitis\". Per pt was sent here by her wound care doctor for further evaluation. Denies fevers/chills. No N/V. Has noted some drainage from her L knee wound. Does to have hardware in her L leg  (Dr David Hurst) 
 
11/3: Has been at [facility] in [facility] for the last 2 weeks. Sent to the ER as she was having more drainage from her knee. No other complaints. Review of Systems: A comprehensive review of systems was negative except for that written in the HPI. Objective:  
Physical Exam:  
 
Visit Vitals BP (!) 128/58 (BP 1 Location: Right arm, BP Patient Position: At rest) Pulse 86 Temp 98.1 °F (36.7 °C) Resp 16 Wt 255 lb (115.7 kg) SpO2 99% BMI 39.94 kg/m² O2 Flow Rate (L/min): 3 l/min O2 Device: Nasal cannula Temp (24hrs), Av.4 °F (36.9 °C), Min:98.1 °F (36.7 °C), Max:99.1 °F (37.3 °C) 
   1901 -  0700 In: 500 [I.V.:500] Out: -    No intake/output data recorded. General:  Alert, cooperative, no distress Obese Head:  Normocephalic, without obvious abnormality, atraumatic. Eyes:  Conjunctivae/corneas clear. PERRL, EOMs intact. Nose: Nares normal. Septum midline. Mucosa normal. No drainage or sinus tenderness. Throat: Lips, mucosa, and tongue normal.   
Neck: Supple, symmetrical, trachea midline, no adenopathy, thyroid: no enlargement/tenderness/nodules, no carotid bruit and no JVD. Back:   Symmetric, no curvature. ROM normal. No CVA tenderness. Lungs:   Clear to auscultation bilaterally. Chest wall:  No tenderness or deformity. Heart:  Regular rate and rhythm, S1, S2 normal, no murmur, click, rub or gallop. Abdomen:   Soft, non-tender. Bowel sounds normal. No masses,  No organomegaly. Extremities: Extremities swollen and weeping, erythematous bilaterally, left knee with small amount of drainage present on bandage, both LE scaly and dry,  atraumatic, no cyanosis  No calf tenderness or cords. Pulses: 1+ and symmetric all extremities. Skin: Skin turgor normal.  
Neurologic: CNII-XII intact. Alert and oriented X 3. Fine motor of hands and fingers normal.   equal.  No cogwheeling or rigidity. Gait not tested at this time. Sensation grossly normal to touch. Gross motor of extremities normal.    
 
Data Review: CT LE 11/2/20 FINDINGS: The bones are mildly osteopenic. There is no fracture or dislocation 
or osseous attenuation abnormality to suggest osteomyelitis. 
  
There is no joint prosthesis demonstrated. There is no substantial knee or ankle 
arthrosis. No knee1 or ankle effusion is shown. 
  
The muscles are normal in size and attenuation. No compartmental mass or 
collection is shown. There are scattered atherosclerotic calcifications which 
are greater on the left especially at the proximal left popliteal artery. 
  
There is diffuse bilateral subcutaneous strand-like opacification with areas of 
subdermal confluence anterolaterally on the left at the level of the knee. Diffuse skin thickening is shown. 
  
IMPRESSION:  
Diffuse bilateral subcutaneous strand-like opacification which could be on 
inflammatory basis or related to chronic venous stasis and lymphedema. There are 
areas of subdermal confluence of nonspecific nature anteromedially at the level 
of the knee. There is no localized intramuscular collection, joint effusion, or 
CT imaging evidence for osteomyelitis. Recent Days: 
Recent Labs 11/02/20 
1626 WBC 13.1* HGB 10.0* HCT 34.4*  
 Recent Labs 11/03/20 
0509 11/02/20 
1626  143  
K 4.4 4.2 * 111* CO2 24 26 * 132* BUN 17 17 CREA 0.89 1.05* CA 7.9* 8.1* ALB  --  2.3* TBILI  --  0.5 ALT  --  50  
 
 
24 Hour Results: 
Recent Results (from the past 24 hour(s)) C REACTIVE PROTEIN, QT Collection Time: 11/02/20  4:26 PM  
Result Value Ref Range C-Reactive protein 0.54 0.00 - 0.60 mg/dL SED RATE (ESR) Collection Time: 11/02/20  4:26 PM  
Result Value Ref Range Sed rate, automated 35 (H) 0 - 30 mm/hr CBC WITH AUTOMATED DIFF Collection Time: 11/02/20  4:26 PM  
Result Value Ref Range WBC 13.1 (H) 3.6 - 11.0 K/uL  
 RBC 3.65 (L) 3.80 - 5.20 M/uL  
 HGB 10.0 (L) 11.5 - 16.0 g/dL HCT 34.4 (L) 35.0 - 47.0 % MCV 94.2 80.0 - 99.0 FL  
 MCH 27.4 26.0 - 34.0 PG  
 MCHC 29.1 (L) 30.0 - 36.5 g/dL RDW 23.5 (H) 11.5 - 14.5 % PLATELET 766 233 - 723 K/uL MPV 10.1 8.9 - 12.9 FL  
 NRBC 0.0 0  WBC ABSOLUTE NRBC 0.00 0.00 - 0.01 K/uL NEUTROPHILS 82 (H) 32 - 75 % LYMPHOCYTES 7 (L) 12 - 49 % MONOCYTES 8 5 - 13 % EOSINOPHILS 1 0 - 7 % BASOPHILS 0 0 - 1 % IMMATURE GRANULOCYTES 2 (H) 0.0 - 0.5 % ABS. NEUTROPHILS 10.8 (H) 1.8 - 8.0 K/UL  
 ABS. LYMPHOCYTES 0.9 0.8 - 3.5 K/UL  
 ABS. MONOCYTES 1.0 0.0 - 1.0 K/UL  
 ABS. EOSINOPHILS 0.1 0.0 - 0.4 K/UL  
 ABS. BASOPHILS 0.0 0.0 - 0.1 K/UL  
 ABS. IMM. GRANS. 0.3 (H) 0.00 - 0.04 K/UL  
 DF SMEAR SCANNED    
 RBC COMMENTS ANISOCYTOSIS 2+ 
    
 RBC COMMENTS MICROCYTOSIS 
2+ WBC COMMENTS TOXIC GRANULATION    
METABOLIC PANEL, COMPREHENSIVE Collection Time: 11/02/20  4:26 PM  
Result Value Ref Range Sodium 143 136 - 145 mmol/L Potassium 4.2 3.5 - 5.1 mmol/L Chloride 111 (H) 97 - 108 mmol/L  
 CO2 26 21 - 32 mmol/L Anion gap 6 5 - 15 mmol/L Glucose 132 (H) 65 - 100 mg/dL BUN 17 6 - 20 MG/DL  Creatinine 1.05 (H) 0.55 - 1.02 MG/DL  
 BUN/Creatinine ratio 16 12 - 20    
 GFR est AA >60 >60 ml/min/1.73m2 GFR est non-AA 52 (L) >60 ml/min/1.73m2 Calcium 8.1 (L) 8.5 - 10.1 MG/DL Bilirubin, total 0.5 0.2 - 1.0 MG/DL  
 ALT (SGPT) 50 12 - 78 U/L  
 AST (SGOT) 34 15 - 37 U/L Alk. phosphatase 267 (H) 45 - 117 U/L Protein, total 5.5 (L) 6.4 - 8.2 g/dL Albumin 2.3 (L) 3.5 - 5.0 g/dL Globulin 3.2 2.0 - 4.0 g/dL A-G Ratio 0.7 (L) 1.1 - 2.2 SAMPLES BEING HELD Collection Time: 11/02/20  4:26 PM  
Result Value Ref Range SAMPLES BEING HELD SST.RED.CHELY   
 COMMENT Add-on orders for these samples will be processed based on acceptable specimen integrity and analyte stability, which may vary by analyte. PROCALCITONIN Collection Time: 11/02/20  4:26 PM  
Result Value Ref Range Procalcitonin 0.20 ng/mL LACTIC ACID Collection Time: 11/02/20  4:45 PM  
Result Value Ref Range Lactic acid 1.0 0.4 - 2.0 MMOL/L  
CULTURE, WOUND W GRAM STAIN Collection Time: 11/02/20  7:25 PM  
 Specimen: Knee ; Wound Result Value Ref Range Special Requests: NO SPECIAL REQUESTS    
 GRAM STAIN NO WBC'S SEEN    
 GRAM STAIN NO ORGANISMS SEEN Culture result: PENDING   
GLUCOSE, POC Collection Time: 11/02/20  9:09 PM  
Result Value Ref Range Glucose (POC) 120 (H) 65 - 100 mg/dL Performed by Vira Dempsey GLUCOSE, POC Collection Time: 11/02/20 10:11 PM  
Result Value Ref Range Glucose (POC) 127 (H) 65 - 100 mg/dL Performed by Peter Wimer METABOLIC PANEL, BASIC Collection Time: 11/03/20  5:09 AM  
Result Value Ref Range Sodium 142 136 - 145 mmol/L Potassium 4.4 3.5 - 5.1 mmol/L Chloride 112 (H) 97 - 108 mmol/L  
 CO2 24 21 - 32 mmol/L Anion gap 6 5 - 15 mmol/L Glucose 125 (H) 65 - 100 mg/dL BUN 17 6 - 20 MG/DL Creatinine 0.89 0.55 - 1.02 MG/DL  
 BUN/Creatinine ratio 19 12 - 20 GFR est AA >60 >60 ml/min/1.73m2 GFR est non-AA >60 >60 ml/min/1.73m2  Calcium 7.9 (L) 8.5 - 10.1 MG/DL  
GLUCOSE, POC  
 Collection Time: 11/03/20  6:46 AM  
Result Value Ref Range Glucose (POC) 112 (H) 65 - 100 mg/dL Performed by [place] Medications reviewed Current Facility-Administered Medications Medication Dose Route Frequency  0.9% sodium chloride infusion  75 mL/hr IntraVENous CONTINUOUS  
 sodium chloride (NS) flush 5-40 mL  5-40 mL IntraVENous Q8H  
 sodium chloride (NS) flush 5-40 mL  5-40 mL IntraVENous PRN  
 acetaminophen (TYLENOL) tablet 650 mg  650 mg Oral Q4H PRN  
 HYDROcodone-acetaminophen (NORCO) 5-325 mg per tablet 1 Tab  1 Tab Oral Q4H PRN  
 naloxone (NARCAN) injection 0.4 mg  0.4 mg IntraVENous PRN  
 ondansetron (ZOFRAN) injection 4 mg  4 mg IntraVENous Q4H PRN  
 glucose chewable tablet 16 g  4 Tab Oral PRN  
 dextrose (D50W) injection syrg 12.5-25 g  25-50 mL IntraVENous PRN  
 glucagon (GLUCAGEN) injection 1 mg  1 mg IntraMUSCular PRN  
 insulin lispro (HUMALOG) injection   SubCUTAneous AC&HS  vancomycin (VANCOCIN) 1500 mg in  ml infusion  1,500 mg IntraVENous Q24H  cefepime (MAXIPIME) 2 g in sterile water (preservative free) 10 mL IV syringe  2 g IntraVENous Q8H  
 apixaban (ELIQUIS) tablet 5 mg  5 mg Oral BID  cholecalciferol (VITAMIN D3) (1000 Units /25 mcg) tablet 2 Tab  2,000 Units Oral DAILY  cyanocobalamin (VITAMIN B12) tablet 1,000 mcg  1,000 mcg Oral DAILY  digoxin (LANOXIN) tablet 0.125 mg  0.125 mg Oral DAILY  DULoxetine (CYMBALTA) capsule 60 mg  60 mg Oral DAILY  ferrous sulfate tablet 325 mg  325 mg Oral DAILY WITH BREAKFAST  lactobac ac& pc-s.therm-b.anim (NUBIA Q/RISAQUAD)  1 Cap Oral DAILY  loperamide (IMODIUM) capsule 4 mg  4 mg Oral BID  magnesium oxide (MAG-OX) tablet 400 mg  400 mg Oral QHS  mirtazapine (REMERON) tablet 15 mg  15 mg Oral QHS  arformoterol 15 mcg/budesonide 0.5 mg neb solution   Nebulization BID RT  
 montelukast (SINGULAIR) tablet 10 mg  10 mg Oral QPM  
  pantoprazole (PROTONIX) tablet 40 mg  40 mg Oral ACB&D  potassium chloride SR (KLOR-CON 10) tablet 10 mEq  10 mEq Oral BID  predniSONE (DELTASONE) tablet 10 mg  10 mg Oral BID WITH MEALS  
 albuterol-ipratropium (DUO-NEB) 2.5 MG-0.5 MG/3 ML  3 mL Nebulization Q6H PRN  
 albuterol (ACCUNEB) nebulizer solution 1.25 mg  1.25 mg Nebulization Q2H PRN Care Plan discussed with: Patient/Family Total time spent with patient and review of records: 30 minutes.  
 
Bibi Solano MD

## 2020-11-03 NOTE — H&P
[facility] [address] [place] [address] 
[phone] Admission History and Physical 
 
 
NAME:  Tashi Perkins :   1945 MRN:  [id number] PCP:  Gigi Galindo MD  
 
Date/Time:  11/3/2020 Subjective: CHIEF COMPLAINT: \"I'm okay\" HISTORY OF PRESENT ILLNESS:    
Ms. Mayelin Fry is a 69 y.o.  female with PMH of a-fib, CAD, DM, asthma, h/p DVT admitted for LLE \"cellulitis\". Per pt was sent here by her wound care doctor for further evaluation. Denies fevers/chills. No N/V. Has noted some drainage from her L knee wound. Does to have hardware in her L leg Past Medical History:  
Diagnosis Date  Asthma  Atrial fibrillation ([address] 2018  Autoimmune disease ([address]   
 fibromyalgia  CAD (coronary artery disease) 10/9/2015  Closed wedge compression fracture of T7 vertebra ([address] 2018  Diabetes ([address]  DVT (deep vein thrombosis) in pregnancy  GERD (gastroesophageal reflux disease)  Heart failure ([address]  History of vascular access device 2020  
 4f midline, 12cm at 1cm out placed in L Cephalic by Barbara Sy RN  
 HTN (hypertension)  NSTEMI (non-ST elevated myocardial infarction) ([address] 10/9/2015  Obesity (BMI 30-39.9) 2018  Sleep apnea   
 wears CPAP Past Surgical History:  
Procedure Laterality Date  ABDOMEN SURGERY PROC UNLISTED    
 hital hernia repair, gall bladder removed  AMPUTATION TRANSMETACARPAL Right 2019  
 entire ring finger, 2nd & 3rd fingers (transmetacarpal)  BREAST SURGERY PROCEDURE UNLISTED    
 lumpectomy  CARDIAC SURG PROCEDURE UNLIST  10/9/15  
 2 stents [address]  HX COLOSTOMY    
 and reversal, post episiotomy repair [address]  HX UROLOGICAL  2009  
 bladder sling  IR KYPHOPLASTY LUMBAR  10/8/2020 Social History Tobacco Use  Smoking status: Former Smoker Last attempt to quit: 3/30/2017 Years since quitting: 3.6  Smokeless tobacco: Never Used Substance Use Topics  Alcohol use: No  
  Alcohol/week: 0.0 standard drinks Comment: very very rarely Family History Problem Relation Age of Onset  Diabetes Mother  Heart Failure Mother  Kidney Disease Mother  Diabetes Father  Heart Disease Father  Elevated Lipids Father  Heart Failure Father  Heart Disease Brother  Cancer Brother   
     kidney  Diabetes Brother  No Known Problems Brother  No Known Problems Brother Allergies Allergen Reactions  Advair Diskus [Fluticasone Propion-Salmeterol] Rash  Aspartame Other (comments)  Breo Ellipta [Fluticasone Furoate-Vilanterol] Rash  Bumex [Bumetanide] Myalgia  Ciprofloxacin Rash  Statins-Hmg-Coa Reductase Inhibitors Other (comments) Muscle pain Lipitor/crestor/zocor  Sulfa (Sulfonamide Antibiotics) Rash  Tetracycline Other (comments) musclepain  Torsemide Rash and Myalgia  Zetia [Ezetimibe] Myalgia Prior to Admission medications Medication Sig Start Date End Date Taking? Authorizing Provider  
apixaban (ELIQUIS) 5 mg tablet Take 5 mg by mouth two (2) times a day. Yes Provider, Historical  
doxycycline (VIBRA-TABS) 100 mg tablet Take 100 mg by mouth two (2) times a day. 10/27/20 11/2/20 Yes Provider, Historical  
alendronate (FOSAMAX) 70 mg tablet Take 70 mg by mouth every thirty (30) days. On the 1st day of each month   Yes Provider, Historical  
ferrous sulfate (Slow Fe) 142 mg (45 mg iron) ER tablet Take 142 mg by mouth Daily (before breakfast). Yes Provider, Historical  
zinc oxide 20 % ointment Apply  to affected area three (3) times daily. Bilateral gluteal and perianal area each shift   Yes Provider, Historical  
loperamide (IMODIUM) 2 mg capsule Take 4 mg by mouth two (2) times a day.    Yes Provider, Historical  
 honey (MediHoney, honey,) 100 % topical paste Apply  to affected area daily. Yes Provider, Historical  
cyanocobalamin 1,000 mcg tablet Take 1 Tab by mouth daily. 10/13/20  Yes Karla Gerard MD  
digoxin (LANOXIN) 0.125 mg tablet Take 1 Tab by mouth daily. 10/13/20  Yes Karla Gerard MD  
ferrous sulfate 325 mg (65 mg iron) tablet Take 1 Tab by mouth daily (with breakfast). 10/13/20  Yes Karla Gerard MD  
tiotropium bromide (Spiriva Respimat) 2.5 mcg/actuation inhaler Take 2 Puffs by inhalation daily. Yes Provider, Historical  
montelukast (Singulair) 10 mg tablet Take 10 mg by mouth every evening. Yes Provider, Historical  
sAXagliptin (ONGLYZA) 5 mg tab tablet Take 5 mg by mouth Daily (before dinner). Yes Provider, Historical  
metoprolol tartrate (LOPRESSOR) 25 mg tablet Take 25 mg by mouth daily as needed (For systolic >989). Yes Provider, Historical  
acetaminophen (TYLENOL) 325 mg tablet Take 1 Tab by mouth every four (4) hours as needed for Pain. 3/31/20  Yes Karla Gerard MD  
magnesium oxide (MAG-OX) 400 mg tablet Take 400 mg by mouth nightly. Yes Provider, Historical  
potassium chloride SR (KLOR-CON 10) 10 mEq tablet Take 10 mEq by mouth two (2) times a day. Yes Provider, Historical  
predniSONE (DELTASONE) 10 mg tablet Take 10 mg by mouth two (2) times daily (with meals). Yes Provider, Historical  
Omeprazole delayed release (PRILOSEC D/R) 20 mg tablet Take 20 mg by mouth two (2) times a day. Yes Provider, Historical  
promethazine (PHENERGAN) 25 mg tablet Take 25 mg by mouth every eight (8) hours as needed for Nausea (Nausea not relieved by ondansetron). Yes Provider, Historical  
mirtazapine (REMERON) 15 mg tablet Take 15 mg by mouth nightly.    Yes Provider, Historical  
albuterol (PROVENTIL VENTOLIN) 2.5 mg /3 mL (0.083 %) nebulizer solution 2.5 mg by Nebulization route every six (6) hours as needed for Wheezing or Shortness of Breath. Yes Provider, Historical  
albuterol (PROAIR HFA) 90 mcg/actuation inhaler Take 2 Puffs by inhalation every four (4) hours as needed. Yes Provider, Historical  
lactobacillus rhamnosus gg 10 billion cell (CULTURELLE) 10 billion cell capsule Take 1 Cap by mouth daily. Yes Provider, Historical  
biotin 10,000 mcg cap Take 1 Cap by mouth daily. Yes Provider, Historical  
DULoxetine (CYMBALTA) 60 mg capsule Take 60 mg by mouth daily. Yes Provider, Historical  
cholecalciferol, vitamin D3, (Vitamin D3) 50 mcg (2,000 unit) tab Take 2,000 Units by mouth daily. Yes Provider, Historical  
azelastine-fluticasone (DYMISTA) 137-50 mcg/spray spry 2 Sprays by Nasal route daily as needed (allergies). Yes Provider, Historical  
mometasone-formoterol (DULERA) 200-5 mcg/actuation HFA inhaler Take 2 Puffs by inhalation two (2) times daily as needed. Yes Provider, Historical  
 
 
 
Review of Systems: 
(bold if positive, if negative) Gen:  Eyes:  ENT:  CVS:  Pulm:  GI:   
:   
MS:  Skin:  Psych:  Endo:   
Hem:  Renal:   
Neuro: LLE wound Objective: VITALS:   
Vital signs reviewed; most recent are: 
 
Visit Vitals BP (!) 128/58 (BP 1 Location: Right arm, BP Patient Position: At rest) Pulse 88 Temp 98.1 °F (36.7 °C) Resp 16 Wt 115.7 kg (255 lb) SpO2 100% BMI 39.94 kg/m² SpO2 Readings from Last 6 Encounters:  
11/02/20 100% 10/12/20 96% 06/02/20 100% 03/31/20 95% 01/31/20 96% 10/31/19 96% O2 Flow Rate (L/min): 3 l/min Intake/Output Summary (Last 24 hours) at 11/3/2020 0049 Last data filed at 11/2/2020 2106 Gross per 24 hour Intake 500 ml Output  Net 500 ml Exam:  
 
Physical Exam: 
 
Gen:  Well-developed, well-nourished, in no acute distress HEENT:  Pink conjunctivae, PERRL, hearing intact to voice, moist mucous membranes Neck:  Supple, without masses, thyroid non-tender Resp:  No accessory muscle use, clear breath sounds without wheezes rales or rhonchi 
Card:  No murmurs, normal S1, S2 without thrills, bruits or peripheral edema Abd:  Soft, non-tender, non-distended, normoactive bowel sounds are present, no palpable organomegaly Lymph:  No cervical adenopathy Musc:  No cyanosis or clubbing Skin:  No rashes or ulcers, skin turgor is good Neuro:  Cranial nerves 3-12 are grossly intact Psych:  Alert with good insight. Oriented to person, place, and time Bilateral chronic venous stasis changes. Very small patch of erythema near the L patella. No expressible pus LE with bilateral lymphedema. Difficult to palpate pulses due to edema dorsal surface of the feet Labs: 
 
Recent Labs 11/02/20 
1626 WBC 13.1* HGB 10.0* HCT 34.4*  
 Recent Labs 11/02/20 
1626   
K 4.2 * CO2 26 * BUN 17 CREA 1.05* CA 8.1* ALB 2.3* ALT 50 No components found for: [place] No results for input(s): PH, PCO2, PO2, HCO3, FIO2 in the last 72 hours. No results for input(s): INR, INREXT in the last 72 hours. CT LE =>  
Diffuse bilateral subcutaneous strand-like opacification which could be on 
inflammatory basis or related to chronic venous stasis and lymphedema. There are 
areas of subdermal confluence of nonspecific nature anteromedially at the level 
of the knee. There is no localized intramuscular collection, joint effusion, or 
CT imaging evidence for osteomyelitis. Assessment/Plan: ?Cellulitis - reported in the LLE. Does have a very small wound over the left patellar region but not exquisitely warm or tender to touch. No expressible pus.  CT above without abscess  
-f.u cultures  
-continue IV antibiotics though suspect can rapidly de-escalate  
-suspect drainage is more likely 2/2 extravascular fluid \"leaking\" from an opening (pt with lymphedema) rather than actual acute infection but difficult to determine as I have not visualized the fluid  
-will ask ortho to eval but DOUBT joint infection as ESR not markedly elevated and exam relatively benign and underwhelming CAD (coronary artery disease) (10/9/2015) 
-not on ASA, statin 
-continue metoprolol Type II diabetes mellitus ([address] (10/9/2015) -ISS  
-BG checks AC TID and qHS  
-resume home meds at discharge Essential hypertension (1/22/2016) -continue metoprolol Recurrent deep vein thrombosis (DVT) ([address] (3/30/2018) 
-on Eliquis Chronic anticoagulation (3/30/2018) -Eliquis for a-fib and h/o DVT Sleep apnea (1/5/2019) Overview: wears CPAP 
-CPAP qHS Atrial fibrillation ([address] (11/16/2018) 
-on digoxin and metoprolol  
-continue eliquis COPD (chronic obstructive pulmonary disease) ([address] (7/13/2019) - chronically on O2  
-continue nebs PAD (peripheral artery disease) ([facility]CC) (7/13/2019)/PVD 
-check LE dopplers Severe obesity ([address] (7/30/2019) 
-counseled on weight loss Chronic respiratory failure with hypoxia ([address] (11/2/2020) Overview: On 3L NC at home  
-on 3L Surrogate decision maker: Daughter Total time spent with patient: 70 Minutes Care Plan discussed with: Patient and Nursing Staff Discussed:  Code Status, Care Plan and D/C Planning Prophylaxis:  Eliquis Probable Disposition:  Coye Rang 
        
___________________________________________________ Attending Physician: Bradley Mitchell MD

## 2020-11-03 NOTE — ED NOTES
TRANSFER - OUT REPORT: 
 
Verbal report given to Lavon Enriquez RN (name) on Mikayla Tang  being transferred to 5th floor(unit) for routine progression of care Report consisted of patients Situation, Background, Assessment and  
Recommendations(SBAR). Information from the following report(s) SBAR, ED Summary, Intake/Output, MAR, Recent Results and Cardiac Rhythm NSR was reviewed with the receiving nurse. Lines:  
Peripheral IV 11/02/20 Left Arm (Active) Site Assessment Clean, dry, & intact 11/02/20 1546 Phlebitis Assessment 0 11/02/20 1546 Infiltration Assessment 0 11/02/20 1546 Dressing Status Clean, dry, & intact 11/02/20 1546 Hub Color/Line Status Flushed 11/02/20 1546 Peripheral IV 11/02/20 Left Antecubital (Active) Site Assessment Clean, dry, & intact 11/02/20 1827 Dressing Status Clean, dry, & intact 11/02/20 1827 Opportunity for questions and clarification was provided. Patient transported with: 
 Monitor O2 @ 3 liters Registered Nurse Visit Vitals BP (!) 142/47 (BP 1 Location: Right arm, BP Patient Position: At rest) Pulse 90 Temp 98.3 °F (36.8 °C) Resp 18 Wt 115.7 kg (255 lb) SpO2 100% BMI 39.94 kg/m²

## 2020-11-03 NOTE — CONSULTS
ORTHO CONSULT Subjective:  
 
Date of Consultation:  November 3, 2020 Referring Physician:  Dr. Meryl Craven. Modesto Hashimoto is a 69 y.o. female we are consulted to see to eval for septic joint in L knee. Pt. Has been treated for LLE cellulitis for several weeks on PO abx. Admitted for worsening infection and placed on IV abx. Pt. Denies L knee or ankle pain. Patient Active Problem List  
 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   
     kidney  Diabetes Brother  No Known Problems Brother  No Known Problems Brother Social History Tobacco Use  Smoking status: Former Smoker Last attempt to quit: 3/30/2017 Years since quitting: 3.6  Smokeless tobacco: Never Used Substance Use Topics  Alcohol use: No  
  Alcohol/week: 0.0 standard drinks Comment: very very rarely Past Medical History:  
Diagnosis Date  Asthma  Atrial fibrillation ([facility] 11/16/2018  Autoimmune disease ([facility]   
 fibromyalgia  CAD (coronary artery disease) 10/9/2015  Closed wedge compression fracture of T7 vertebra ([facility] 11/13/2018  Diabetes ([facility]  DVT (deep vein thrombosis) in pregnancy  GERD (gastroesophageal reflux disease)  Heart failure ([facility]  History of vascular access device 09/30/2020  
 4f midline, 12cm at 1cm out placed in L Cephalic by Carmel Lanza RN  
 HTN (hypertension)  NSTEMI (non-ST elevated myocardial infarction) ([facility] 10/9/2015  Obesity (BMI 30-39.9) 11/13/2018  Sleep apnea   
 wears CPAP Past Surgical History:  
Procedure Laterality Date  ABDOMEN SURGERY PROC UNLISTED    
 hital hernia repair, gall bladder removed  AMPUTATION TRANSMETACARPAL Right 06/12/2019  
 entire ring finger, 2nd & 3rd fingers (transmetacarpal)  BREAST SURGERY PROCEDURE UNLISTED    
 lumpectomy  CARDIAC SURG PROCEDURE UNLIST  10/9/15  
 2 stents [facility]  HX COLOSTOMY    
 and reversal, post episiotomy repair [address]  HX UROLOGICAL  2009  
 bladder sling  IR KYPHOPLASTY LUMBAR  10/8/2020 Prior to Admission medications Medication Sig Start Date End Date Taking? Authorizing Provider  
apixaban (ELIQUIS) 5 mg tablet Take 5 mg by mouth two (2) times a day. Yes Provider, Historical  
doxycycline (VIBRA-TABS) 100 mg tablet Take 100 mg by mouth two (2) times a day. 10/27/20 11/2/20 Yes Provider, Historical  
alendronate (FOSAMAX) 70 mg tablet Take 70 mg by mouth every thirty (30) days. On the 1st day of each month   Yes Provider, Historical  
ferrous sulfate (Slow Fe) 142 mg (45 mg iron) ER tablet Take 142 mg by mouth Daily (before breakfast). Yes Provider, Historical  
zinc oxide 20 % ointment Apply  to affected area three (3) times daily. Bilateral gluteal and perianal area each shift   Yes Provider, Historical  
loperamide (IMODIUM) 2 mg capsule Take 4 mg by mouth two (2) times a day. Yes Provider, Historical  
honey (MediHoney, honey,) 100 % topical paste Apply  to affected area daily. Yes Provider, Historical  
cyanocobalamin 1,000 mcg tablet Take 1 Tab by mouth daily. 10/13/20  Yes Luis F Ronquillo MD  
digoxin (LANOXIN) 0.125 mg tablet Take 1 Tab by mouth daily. 10/13/20  Yes Luis F Ronquillo MD  
ferrous sulfate 325 mg (65 mg iron) tablet Take 1 Tab by mouth daily (with breakfast). 10/13/20  Yes Luis F Ronquillo MD  
tiotropium bromide (Spiriva Respimat) 2.5 mcg/actuation inhaler Take 2 Puffs by inhalation daily. Yes Provider, Historical  
montelukast (Singulair) 10 mg tablet Take 10 mg by mouth every evening. Yes Provider, Historical  
sAXagliptin (ONGLYZA) 5 mg tab tablet Take 5 mg by mouth Daily (before dinner). Yes Provider, Historical  
metoprolol tartrate (LOPRESSOR) 25 mg tablet Take 25 mg by mouth daily as needed (For systolic >447). Yes Provider, Historical  
acetaminophen (TYLENOL) 325 mg tablet Take 1 Tab by mouth every four (4) hours as needed for Pain. 3/31/20  Yes Luis F Ronquillo MD  
magnesium oxide (MAG-OX) 400 mg tablet Take 400 mg by mouth nightly. Yes Provider, Historical  
potassium chloride SR (KLOR-CON 10) 10 mEq tablet Take 10 mEq by mouth two (2) times a day. Yes Provider, Historical  
predniSONE (DELTASONE) 10 mg tablet Take 10 mg by mouth two (2) times daily (with meals). Yes Provider, Historical  
Omeprazole delayed release (PRILOSEC D/R) 20 mg tablet Take 20 mg by mouth two (2) times a day. Yes Provider, Historical  
promethazine (PHENERGAN) 25 mg tablet Take 25 mg by mouth every eight (8) hours as needed for Nausea (Nausea not relieved by ondansetron). Yes Provider, Historical  
mirtazapine (REMERON) 15 mg tablet Take 15 mg by mouth nightly.    Yes Provider, Historical  
 albuterol (PROVENTIL VENTOLIN) 2.5 mg /3 mL (0.083 %) nebulizer solution 2.5 mg by Nebulization route every six (6) hours as needed for Wheezing or Shortness of Breath. Yes Provider, Historical  
albuterol (PROAIR HFA) 90 mcg/actuation inhaler Take 2 Puffs by inhalation every four (4) hours as needed. Yes Provider, Historical  
lactobacillus rhamnosus gg 10 billion cell (CULTURELLE) 10 billion cell capsule Take 1 Cap by mouth daily. Yes Provider, Historical  
biotin 10,000 mcg cap Take 1 Cap by mouth daily. Yes Provider, Historical  
DULoxetine (CYMBALTA) 60 mg capsule Take 60 mg by mouth daily. Yes Provider, Historical  
cholecalciferol, vitamin D3, (Vitamin D3) 50 mcg (2,000 unit) tab Take 2,000 Units by mouth daily. Yes Provider, Historical  
azelastine-fluticasone (DYMISTA) 137-50 mcg/spray spry 2 Sprays by Nasal route daily as needed (allergies). Yes Provider, Historical  
mometasone-formoterol (DULERA) 200-5 mcg/actuation HFA inhaler Take 2 Puffs by inhalation two (2) times daily as needed. Yes Provider, Historical  
 
Current Facility-Administered Medications Medication Dose Route Frequency  [START ON 11/4/2020] Vancomycin - Please draw trough level prior to dose due on 11/4 at 1700. Thanks!    Other ONCE  
 0.9% sodium chloride infusion  75 mL/hr IntraVENous CONTINUOUS  
 sodium chloride (NS) flush 5-40 mL  5-40 mL IntraVENous Q8H  
 sodium chloride (NS) flush 5-40 mL  5-40 mL IntraVENous PRN  
 acetaminophen (TYLENOL) tablet 650 mg  650 mg Oral Q4H PRN  
 HYDROcodone-acetaminophen (NORCO) 5-325 mg per tablet 1 Tab  1 Tab Oral Q4H PRN  
 naloxone (NARCAN) injection 0.4 mg  0.4 mg IntraVENous PRN  
 ondansetron (ZOFRAN) injection 4 mg  4 mg IntraVENous Q4H PRN  
 glucose chewable tablet 16 g  4 Tab Oral PRN  
 dextrose (D50W) injection syrg 12.5-25 g  25-50 mL IntraVENous PRN  
 glucagon (GLUCAGEN) injection 1 mg  1 mg IntraMUSCular PRN  
  insulin lispro (HUMALOG) injection   SubCUTAneous AC&HS  vancomycin (VANCOCIN) 1500 mg in  ml infusion  1,500 mg IntraVENous Q24H  cefepime (MAXIPIME) 2 g in sterile water (preservative free) 10 mL IV syringe  2 g IntraVENous Q8H  
 apixaban (ELIQUIS) tablet 5 mg  5 mg Oral BID  cholecalciferol (VITAMIN D3) (1000 Units /25 mcg) tablet 2 Tab  2,000 Units Oral DAILY  cyanocobalamin (VITAMIN B12) tablet 1,000 mcg  1,000 mcg Oral DAILY  digoxin (LANOXIN) tablet 0.125 mg  0.125 mg Oral DAILY  DULoxetine (CYMBALTA) capsule 60 mg  60 mg Oral DAILY  ferrous sulfate tablet 325 mg  325 mg Oral DAILY WITH BREAKFAST  lactobac ac& pc-s.therm-b.anim (NUBIA Q/RISAQUAD)  1 Cap Oral DAILY  loperamide (IMODIUM) capsule 4 mg  4 mg Oral BID  magnesium oxide (MAG-OX) tablet 400 mg  400 mg Oral QHS  mirtazapine (REMERON) tablet 15 mg  15 mg Oral QHS  arformoterol 15 mcg/budesonide 0.5 mg neb solution   Nebulization BID RT  
 montelukast (SINGULAIR) tablet 10 mg  10 mg Oral QPM  
 pantoprazole (PROTONIX) tablet 40 mg  40 mg Oral ACB&D  potassium chloride SR (KLOR-CON 10) tablet 10 mEq  10 mEq Oral BID  predniSONE (DELTASONE) tablet 10 mg  10 mg Oral BID WITH MEALS  
 albuterol-ipratropium (DUO-NEB) 2.5 MG-0.5 MG/3 ML  3 mL Nebulization Q6H PRN  
 albuterol (ACCUNEB) nebulizer solution 1.25 mg  1.25 mg Nebulization Q2H PRN Allergies Allergen Reactions  Advair Diskus [Fluticasone Propion-Salmeterol] Rash  Aspartame Other (comments)  Breo Ellipta [Fluticasone Furoate-Vilanterol] Rash  Bumex [Bumetanide] Myalgia  Ciprofloxacin Rash  Statins-Hmg-Coa Reductase Inhibitors Other (comments) Muscle pain Lipitor/crestor/zocor  Sulfa (Sulfonamide Antibiotics) Rash  Tetracycline Other (comments) musclepain  Torsemide Rash and Myalgia  Zetia [Ezetimibe] Myalgia Review of Systems:  A comprehensive review of systems was negative except for that written in the HPI. Objective:  
 
Visit Vitals BP (!) 142/69 (BP 1 Location: Right arm, BP Patient Position: At rest) Pulse 74 Temp 98.1 °F (36.7 °C) Resp 17 Wt 115.7 kg (255 lb) SpO2 99% BMI 39.94 kg/m² EXAM: I examined pt's LLE, Cellulitis and lymphedema about the lower leg. No erythema about the knee or ankle region. Clear yellow discharge through the skin medial inferior patella region. No puncta with discharge. No fluctuance of pre patella region. No effusion of the knee, No pain with ROM of the knee or ankle. Cap refill <2 secs all toes. XR Results (most recent): EXAM: CT LOW EXT LT W CONT 
  
INDICATION: Left knee infection, pain. Has been on 2 weeks of antibiotics. Has 
been seen by wound care. Uses home O2.  
  
COMPARISON: None 
  
CONTRAST: 100 mL of Isovue-300. 
  
TECHNIQUE: Helical CT of both lower extremities from the distal femoral 
diaphyses through the ankle joints with coronal and sagittal reformations were 
obtained following the uneventful rapid bolus intravenous contrast 
administration. CT dose reduction was achieved through the use of a standardized 
protocol tailored for this examination and automatic exposure control for dose 
modulation. 
  
FINDINGS: The bones are mildly osteopenic. There is no fracture or dislocation 
or osseous attenuation abnormality to suggest osteomyelitis. 
  
There is no joint prosthesis demonstrated. There is no substantial knee or ankle 
arthrosis. No knee1 or ankle effusion is shown. 
  
The muscles are normal in size and attenuation. No compartmental mass or 
collection is shown. There are scattered atherosclerotic calcifications which 
are greater on the left especially at the proximal left popliteal artery. 
  
There is diffuse bilateral subcutaneous strand-like opacification with areas of 
subdermal confluence anterolaterally on the left at the level of the knee.  
Diffuse skin thickening is shown. 
  
IMPRESSION 
 IMPRESSION:  
Diffuse bilateral subcutaneous strand-like opacification which could be on 
inflammatory basis or related to chronic venous stasis and lymphedema. There are 
areas of subdermal confluence of nonspecific nature anteromedially at the level 
of the knee. There is no localized intramuscular collection, joint effusion, or 
CT imaging evidence for osteomyelitis Assessment/Plan:  
 
Encounter Diagnoses ICD-10-CM ICD-9-CM 1. Cellulitis of left lower extremity  L03.116 682.6 2. PVD (peripheral vascular disease) ([facility])  I73.9 443.9 3. Failure of outpatient treatment  Z78.9 V49.89 No evidence of joint infection of the LLE or infected pre patella bursa noted on CT or PE. Recommend abx, wound care and outpt f/u with FP. Dr. Gerald Vázquez agrees with plan. Please reconsult if any further joint related concerns. SALIMA Lyons-C Orthopaedic Surgery PA

## 2020-11-03 NOTE — PROGRESS NOTES
11/3/2020 Reason for Readmission:    Sepsis 5/29-6/2 also for sepsis 9/29-10/12 for syncope and collapse RUR Score/Risk Level:    32% high PCP: First and Last name:  Dr. Elida Perez, but patient has been seeing the MD at [address] [place] [address] since she was admitted there after discharging from here. Name of Practice:  
 Are you a current patient: Yes/No:  
 Approximate date of last visit:  
 Can you participate in a virtual visit with your PCP:  
 
Is a Care Conference indicated:   Not at this time Did you attend your follow up appointment (s): If not, why not:  Yes patient followed up with MD at [address] [place] [address] Resources/supports as identified by patient/family:   Patient has supportive family Top Challenges facing patient (as identified by patient/family and CM): Finances/Medication cost?     Patient has medication coverage Transportation      Daughter helps as needed, [address] is helpful per patient Support system or lack thereof? Patient has supportive daughter. Living arrangements? Patient has been living at [facility] since discharging from here and plans to go back. Self-care/ADLs/Cognition? Patient is alert and oriented, has good insight, needs some help with ADLs and self-care Current Advanced Directive/Advance Care Plan:   Full code, has ACP on file Plan for utilizing home health:   None at this time, patient plans to return to [facility]. Transition of Care Plan:    Based on readmission, the patient's previous Plan of Care 
 has been evaluated and/or modified. The current Transition of Care Plan is: Patient has been residing at [facility] since her last discharge from here on 10/12. She came back and and was readmitted because the wound care MD at [address] [place] [address] wanted a CT scan to see if her knee was infected. She states her plan is to return to [facility], and she does feel as though she is on the right path with the antibiotics now. She has followed up with the MD over at [address] [place] [address] a couple of times since being discharged from here and has had her wound care managed through them as well. She has been getting her prescriptions through [address] [place] [address] but otherwise gets them through [facility]. Patient's emergency contact is her daughter Camille Starr, who can be reached at [phone]. Patient states that either her daughter will come and pick her up at discharge or [facility]. Please note that [address] [place] [address] is requiring 2 covid tests 24 hours apart, so patient will need these for discharge. Transition of Care Plan 1. Continue IV abx for now 2. Patient plans to return to [address] [place] [address] 3. Daughter or shuttle will transport 4. Patient will need to follow up with MD 
5. CM will continue to follow Care Management Interventions PCP Verified by CM: Manny Kulkarni MD, but patient has been ismael Frazier  @ [facility]) Mode of Transport at Discharge: Other (see comment)(Either daughter or [address]) Transition of Care Consult (CM Consult): SNF Discharge Durable Medical Equipment: No 
Physical Therapy Consult: Yes Occupational Therapy Consult: Yes Speech Therapy Consult: No 
Current Support Network: Family Lives Cinthya 3 Confirm Follow Up Transport: Family Discharge Location Discharge Placement: Skilled nursing facility Readmission Assessment Number of days since last admission?: 8-30 days Previous disposition: SNF Who is being interviewed?: Patient What was the patient's/caregiver's perception as to why they think they needed to return back to the hospital?: Other (Comment)(MD at [facility] wanted CT) Did you visit your Primary Care Physician after you left the hospital, before you returned this time?: No(Followed up with MD at [facility]) Why weren't you able to visit your PCP?: Other (Comment) Did you see a specialist, such as Cardiac, Pulmonary, Orthopedic Physician, etc. after you left the hospital?: Yes(Wound care at [facility]) Who advised the patient to return to the hospital?: Physician Does the patient report anything that got in the way of taking their medications?: No 
In our efforts to provide the best possible care to you and others like you, can you think of anything that we could have done to help you after you left the hospital the first time, so that you might not have needed to return so soon?: Other (Comment)(Patient states she feels there was nothing else we could have done last time, and she feels she is on the right path now.) Gisela Polo, BS

## 2020-11-03 NOTE — PROGRESS NOTES
Bedside and Verbal shift change report given to [employer] (oncoming nurse) by John Valderrama (offgoing nurse). Report included the following information SBAR, Intake/Output, MAR and Recent Results.

## 2020-11-03 NOTE — PROGRESS NOTES
[facility]: MED RECONCILIATION Medications added:  
 
· Apixaban 5 mg PO BID · Doxycycline 100 mg PO BID x 13 doses (started 10/27 and should be near the end of prescribed course now). Patient also recently completed a 7-day course of Augmentin which was prescribed on 10/23. · Alendronate 70 mg PO once monthly on the 1st of each month · Slow-Fe iron 1 tablet once daily before breakfast 
· Zinc oxide 20% ointment TID (each shift) · Medihoney once daily Medications removed: · Cephalexin - old Rx · Ibandronate - takes alendronate currently Medications adjusted: · Montelukast 10 mg PO QPM (instead of in the morning) Information obtained from: patient, medication list from [facility] Significant PMH/Disease States:  
Past Medical History:  
Diagnosis Date  Asthma  Atrial fibrillation ([address] 11/16/2018  Autoimmune disease ([address]   
 fibromyalgia  CAD (coronary artery disease) 10/9/2015  Closed wedge compression fracture of T7 vertebra ([address] 11/13/2018  Diabetes ([address]  DVT (deep vein thrombosis) in pregnancy  GERD (gastroesophageal reflux disease)  Heart failure ([address]  History of vascular access device 09/30/2020  
 4f midline, 12cm at 1cm out placed in L Cephalic by Valerie Munoz RN  
 HTN (hypertension)  NSTEMI (non-ST elevated myocardial infarction) ([facility] 10/9/2015  Obesity (BMI 30-39.9) 11/13/2018  Sleep apnea   
 wears CPAP Chief Complaint for this Admission: Chief Complaint Patient presents with  Knee Pain Allergies: Advair diskus [fluticasone propion-salmeterol]; Aspartame; Breo ellipta [fluticasone furoate-vilanterol]; Bumex [bumetanide]; Ciprofloxacin; Statins-hmg-coa reductase inhibitors; Sulfa (sulfonamide antibiotics); Tetracycline; Torsemide; and Zetia [ezetimibe] Prior to Admission Medications:  
 
Medication Documentation Review Audit Reviewed by Rigoberto Patino, GISELED (Pharmacist) on 11/02/20 at 4355 Medication Sig Documenting Provider Last Dose Status Taking?  
acetaminophen (TYLENOL) 325 mg tablet Take 1 Tab by mouth every four (4) hours as needed for Pain. Rossana Nash MD  Active Yes  
albuterol [facility] HFA) 90 mcg/actuation inhaler Take 2 Puffs by inhalation every four (4) hours as needed. Provider, Historical  Active Yes  
albuterol (PROVENTIL VENTOLIN) 2.5 mg /3 mL (0.083 %) nebulizer solution 2.5 mg by Nebulization route every six (6) hours as needed for Wheezing or Shortness of Breath. Provider, Historical  Active Yes  
alendronate (FOSAMAX) 70 mg tablet Take 70 mg by mouth every thirty (30) days. On the 1st day of each month Provider, Historical 11/1/2020 am Active Yes  
apixaban (ELIQUIS) 5 mg tablet Take 5 mg by mouth two (2) times a day. Provider, Historical 11/2/2020 am Active Yes  
azelastine-fluticasone (DYMISTA) 137-50 mcg/spray spry 2 Sprays by Nasal route daily as needed (allergies). Provider, Historical  Active Yes  
biotin 10,000 mcg cap Take 1 Cap by mouth daily. Provider, Historical 11/2/2020 am Active Yes  
cholecalciferol, vitamin D3, (Vitamin D3) 50 mcg (2,000 unit) tab Take 2,000 Units by mouth daily. Provider, Historical 11/2/2020 am Active Yes  
cyanocobalamin 1,000 mcg tablet Take 1 Tab by mouth daily. Rossana Nash MD 11/2/2020 am Active Yes  
digoxin (LANOXIN) 0.125 mg tablet Take 1 Tab by mouth daily. Rossana Nash MD 11/2/2020 am Active Yes  
doxycycline (VIBRA-TABS) 100 mg tablet Take 100 mg by mouth two (2) times a day. Provider, Historical 11/2/2020 am Active Yes DULoxetine (CYMBALTA) 60 mg capsule Take 60 mg by mouth daily. Provider, Historical 11/2/2020 am Active Yes  
ferrous sulfate (Slow Fe) 142 mg (45 mg iron) ER tablet Take 142 mg by mouth Daily (before breakfast). Provider, Historical 11/2/2020 am Active Yes  
ferrous sulfate 325 mg (65 mg iron) tablet Take 1 Tab by mouth daily (with breakfast). Rossana Nash MD 11/2/2020 am Active Yes honey (MediHoney, honey,) 100 % topical paste Apply  to affected area daily. Provider, Historical 11/2/2020 am Active Yes  
lactobacillus rhamnosus gg 10 billion cell (CULTURELLE) 10 billion cell capsule Take 1 Cap by mouth daily. Provider, Historical 11/2/2020 am Active Yes  
loperamide (IMODIUM) 2 mg capsule Take 4 mg by mouth two (2) times a day. Provider, Historical 11/2/2020 am Active Yes  
magnesium oxide (MAG-OX) 400 mg tablet Take 400 mg by mouth nightly. Provider, Historical 11/1/2020 pm Active Yes  
metoprolol tartrate (LOPRESSOR) 25 mg tablet Take 25 mg by mouth daily as needed (For systolic >016). Provider, Historical  Active Yes  
mirtazapine (REMERON) 15 mg tablet Take 15 mg by mouth nightly. Provider, Historical 11/1/2020 pm Active Yes  
mometasone-formoterol (DULERA) 200-5 mcg/actuation HFA inhaler Take 2 Puffs by inhalation two (2) times daily as needed. Provider, Historical  Active Yes  
montelukast (Singulair) 10 mg tablet Take 10 mg by mouth every evening. Provider, Historical 11/1/2020 pm Active Yes Omeprazole delayed release (PRILOSEC D/R) 20 mg tablet Take 20 mg by mouth two (2) times a day. Provider, Historical 11/2/2020 am Active Yes  
potassium chloride SR (KLOR-CON 10) 10 mEq tablet Take 10 mEq by mouth two (2) times a day. Provider, Historical 11/2/2020 am Active Yes  
predniSONE (DELTASONE) 10 mg tablet Take 10 mg by mouth two (2) times daily (with meals). Provider, Historical 11/2/2020 am Active Yes  
promethazine (PHENERGAN) 25 mg tablet Take 25 mg by mouth every eight (8) hours as needed for Nausea (Nausea not relieved by ondansetron). Provider, Historical  Active Yes sAXagliptin (ONGLYZA) 5 mg tab tablet Take 5 mg by mouth Daily (before dinner). Provider, Historical 11/1/2020 pm Active Yes  
tiotropium bromide (Spiriva Respimat) 2.5 mcg/actuation inhaler Take 2 Puffs by inhalation daily. Provider, Historical 11/2/2020 am Active Yes zinc oxide 20 % ointment Apply  to affected area three (3) times daily. Bilateral gluteal and perianal area each shift Provider, Historical 11/2/2020 am Active Yes Thank you for the consult, 
Misha Cameron D, [address]

## 2020-11-03 NOTE — PROGRESS NOTES
[address] Dosing Services: Antimicrobial Stewardship Progress Note Consult for antibiotic dosing of Vancomycin by Dr. Chema Taylor Pharmacist reviewed antibiotic appropriateness for 69year old , female  for indication of Cellulitis, sepsis Day of Therapy 1 Plan: 
Vancomycin therapy: 
Start Vancomycin therapy, with loading dose of 1750 (mg) at 1730 11/02/20. Follow with maintenance dose of 1500(mg) at 1700 11/03/20 every 24 hours. She was on this dose previously here with a trough of 15.1 mcg/ml. Dose calculated to approximate a therapeutic trough of @ 15 mcg/mL. Last trough level / Plan for level:  
Pharmacy to follow daily and will make changes to dose and/or frequency based on clinical status. Date Dose & Interval Measured (mcg/mL) Extrapolated (mcg/mL) ? ? ? ?  
? ? ? ?  
? ? ? ? Non-Kinetic Antimicrobial Dosing:  
Current Regimen:   
Recommendation:  
Other Antimicrobial 
(not dosed by pharmacist) 
 cefepime Cultures Serum Creatinine Lab Results Component Value Date/Time Creatinine 1.05 (H) 11/02/2020 04:26 PM  
 Creatinine (POC) 1.1 08/01/2019 11:41 AM  
   
Creatinine Clearance CrCl cannot be calculated (Unknown ideal weight.). Procalcitonin  No results found for: PCT Temp  
99.1 °F (37.3 °C) WBC Lab Results Component Value Date/Time WBC 13.1 (H) 11/02/2020 04:26 PM  
   
H/H Lab Results Component Value Date/Time HGB 10.0 (L) 11/02/2020 04:26 PM  
  
Platelets Lab Results Component Value Date/Time PLATELET 822 34/95/7435 04:26 PM  
 
  
 
 
Pharmacist: Aisha information: [phone]

## 2020-11-03 NOTE — PROGRESS NOTES
Problem: Falls - Risk of 
Goal: *Absence of Falls Description: Document Lemon Marlon Fall Risk and appropriate interventions in the flowsheet. Outcome: Progressing Towards Goal 
Note: Fall Risk Interventions: 
Mobility Interventions: Patient to call before getting OOB, Bed/chair exit alarm Medication Interventions: Bed/chair exit alarm, Patient to call before getting OOB Elimination Interventions: Call light in reach, Patient to call for help with toileting needs History of Falls Interventions: Bed/chair exit alarm, Investigate reason for fall, Utilize gait belt for transfer/ambulation Problem: Pressure Injury - Risk of 
Goal: *Prevention of pressure injury Description: Document Darrion Scale and appropriate interventions in the flowsheet. Outcome: Progressing Towards Goal 
Note: Pressure Injury Interventions: 
  
 
Moisture Interventions: Absorbent underpads, Apply protective barrier, creams and emollients Activity Interventions: Pressure redistribution bed/mattress(bed type), Increase time out of bed Mobility Interventions: HOB 30 degrees or less, Float heels, Pressure redistribution bed/mattress (bed type) Nutrition Interventions: Document food/fluid/supplement intake Friction and Shear Interventions: HOB 30 degrees or less, Minimize layers

## 2020-11-03 NOTE — ED NOTES
Bedside and Verbal shift change report given to Jamie Molina RN (oncoming nurse) by Gm Coles RN (offgoing nurse). Report included the following information SBAR, ED Summary, MAR and Recent Results.

## 2020-11-03 NOTE — PROGRESS NOTES
Problem: Self Care Deficits Care Plan (Adult) Goal: *Acute Goals and Plan of Care (Insert Text) Description:  
FUNCTIONAL STATUS PRIOR TO ADMISSION: Patient has been at rehab facility since last hospital admission HOME SUPPORT: The patient lived alone at baseline. Occupational Therapy Goals Initiated 11/3/2020 1. Patient will perform lower body dressing with supervision/set-up within 7 day(s). 2.  Patient will perform grooming, standing at sink, with supervision/set-up within 7 day(s). 3.  Patient will perform toilet transfers with supervision/set-up within 7 day(s). 4.  Patient will perform all aspects of toileting with supervision/set-up within 7 day(s). 5.  Patient will participate in upper extremity therapeutic exercise/activities with supervision/set-up for 10 minutes within 7 day(s). 6.  Patient will utilize energy conservation techniques during functional activities with verbal cues within 7 day(s). Outcome: Progressing Towards Goal 
 OCCUPATIONAL THERAPY EVALUATION Patient: Bibi Quispe (89 y.o. female) Date: 11/3/2020 Primary Diagnosis: Sepsis ([facility] [address] [A41.9] Precautions: fall, contact ASSESSMENT Based on the objective data described below, the patient presents with hospital admission secondary to sepsis and LLE cellulitis. Patient received supine in bed with HOB elevated. Patient agreeable to OOB activity and eager to perform. Patient to EOB with min assist.  Once seated noted patient saturated through brief, gown and pads to bed. Patient agreeable to hygiene and requires mod assist for lindsay area bathing and min assist for new gown. Patient tolerated new brief in standing and able to walk short distance to chair after standing x 6 minutes. Patient SOB but reports baseline with activity and on 3L O2 presently. Patient to benefit form continued OT services and return to [facility] discharge. Cheryl Howard Current Level of Function Impacting Discharge (ADLs/self-care): up to mod assist 
 
Functional Outcome Measure: The patient scored 55/100 on the Barthel Index outcome measure. Other factors to consider for discharge: lives alone Patient will benefit from skilled therapy intervention to address the above noted impairments. PLAN : 
Recommendations and Planned Interventions: self care training, functional mobility training, therapeutic exercise, balance training, endurance activities, patient education, home safety training, and family training/education Frequency/Duration: Patient will be followed by occupational therapy 5 times a week to address goals. Recommendation for discharge: (in order for the patient to meet his/her long term goals) Therapy up to 5 days/week in SNF setting This discharge recommendation: 
Has not yet been discussed the attending provider and/or case management IF patient discharges home will need the following DME: TBD SUBJECTIVE:  
Patient stated I like sitting up.  OBJECTIVE DATA SUMMARY:  
HISTORY:  
Past Medical History:  
Diagnosis Date Asthma Atrial fibrillation ([address] 11/16/2018 Autoimmune disease ([facility] [address]   
 fibromyalgia CAD (coronary artery disease) 10/9/2015 Closed wedge compression fracture of T7 vertebra ([address] 11/13/2018 Diabetes ([address] DVT (deep vein thrombosis) in pregnancy GERD (gastroesophageal reflux disease) Heart failure ([address] History of vascular access device 09/30/2020  
 4f midline, 12cm at 1cm out placed in L Cephalic by Valerie Munoz RN  
 HTN (hypertension) NSTEMI (non-ST elevated myocardial infarction) ([facility] [address] 10/9/2015 Obesity (BMI 30-39.9) 11/13/2018 Sleep apnea   
 wears CPAP Past Surgical History:  
Procedure Laterality Date ABDOMEN SURGERY PROC UNLISTED    
 hital hernia repair, gall bladder removed AMPUTATION TRANSMETACARPAL Right 06/12/2019 entire ring finger, 2nd & 3rd fingers (transmetacarpal) BREAST SURGERY PROCEDURE UNLISTED    
 lumpectomy CARDIAC SURG PROCEDURE UNLIST  10/9/15  
 2 stents [facility] 64 HX COLOSTOMY    
 and reversal, post episiotomy repair [facility] HX UROLOGICAL  2009  
 bladder sling IR KYPHOPLASTY LUMBAR  10/8/2020 Expanded or extensive additional review of patient history:  
 
Home Situation Home Environment: Private residence # Steps to Enter: 0 One/Two Story Residence: One story Living Alone: Yes Patient Expects to be Discharged to[de-identified] Unknown Current DME Used/Available at Home: Berneta Amsler, rollator, Walker, rolling, Wheelchair, Commode, bedside, Shower chair, Grab bars(lift recliner) Tub or Shower Type: Shower Hand dominance: Right EXAMINATION OF PERFORMANCE DEFICITS: 
Cognitive/Behavioral Status: 
  
  
  
  
  
  
 
Skin: intact as seen Edema: LEs Hearing: Auditory Auditory Impairment: Hard of hearing, bilateral 
 
Vision/Perceptual:   
    
    
    
  
    
    
  
 
Range of Motion: 
AROM: Within functional limits PROM: Within functional limits Strength: 
Strength: Generally decreased, functional 
  
  
  
  
 
Coordination: 
Coordination: Within functional limits Tone & Sensation: 
 
  
  
  
  
  
  
  
  
 
Balance: 
Sitting: Intact; High guard Standing: Impaired; With support Standing - Static: Fair Standing - Dynamic : Fair Functional Mobility and Transfers for ADLs: 
Bed Mobility: 
Rolling: Minimum assistance Supine to Sit: Minimum assistance Sit to Supine: Minimum assistance Scooting: Minimum assistance Transfers: 
Sit to Stand: Minimum assistance Stand to Sit: Minimum assistance Bed to Chair: Minimum assistance Toilet Transfer : Minimum assistance Assistive Device : Walker, rolling ADL Assessment: 
Feeding: Setup Oral Facial Hygiene/Grooming: Setup(seated ) Bathing: Moderate assistance Upper Body Dressing: Minimum assistance Lower Body Dressing: Moderate assistance Toileting: Moderate assistance ADL Intervention and task modifications: 
  
 
  
 
  
 
  
 
  
 
  
 
  
 
  
 
Therapeutic Exercise: 
  
Functional Measure: 
Barthel Index: 
 
Bathin Bladder: 5 Bowels: 5 Groomin Dressin Feeding: 10 Mobility: 10 Stairs: 0 Toilet Use: 5 Transfer (Bed to Chair and Back): 10 Total: 55/100 The Barthel ADL Index: Guidelines 1. The index should be used as a record of what a patient does, not as a record of what a patient could do. 2. The main aim is to establish degree of independence from any help, physical or verbal, however minor and for whatever reason. 3. The need for supervision renders the patient not independent. 4. A patient's performance should be established using the best available evidence. Asking the patient, friends/relatives and nurses are the usual sources, but direct observation and common sense are also important. However direct testing is not needed. 5. Usually the patient's performance over the preceding 24-48 hours is important, but occasionally longer periods will be relevant. 6. Middle categories imply that the patient supplies over 50 per cent of the effort. 7. Use of aids to be independent is allowed. Marga Chaney, Barthel DJosiahWJosiah ([id number]). Functional evaluation: the Barthel Index. [facility] (14)2. DAMI Lew, Vera Chan., Marlena Ibrahim., Sánchez Perez, [address] [place] [address] (). Measuring the change indisability after inpatient rehabilitation; comparison of the responsiveness of the Barthel Index and Functional [place] Measure. Journal of Neurology, Neurosurgery, and Psychiatry, 66(4), 654-144. HERNAN Majano.[email or web address], RENE Parry.MEGHA, & Adenike Joseph, MJosiahA. (2004.) Assessment of post-stroke quality of life in cost-effectiveness studies: The usefulness of the Barthel Index and the EuroQoL-5D.  Quality of Life Research, 13, 542-21 Occupational Therapy Evaluation Charge Determination History Examination Decision-Making LOW Complexity : Brief history review  LOW Complexity : 1-3 performance deficits relating to physical, cognitive , or psychosocial skils that result in activity limitations and / or participation restrictions  LOW Complexity : No comorbidities that affect functional and no verbal or physical assistance needed to complete eval tasks Based on the above components, the patient evaluation is determined to be of the following complexity level: LOW Pain Rating: 
 
 
Activity Tolerance:  
Good and requires rest breaks After treatment patient left in no apparent distress:   
Sitting in chair and Call bell within reach COMMUNICATION/EDUCATION:  
The patients plan of care was discussed with: Physical therapist and Registered nurse. Home safety education was provided and the patient/caregiver indicated understanding., Patient/family have participated as able in goal setting and plan of care. , and Patient/family agree to work toward stated goals and plan of care. This patients plan of care is appropriate for delegation to [facility]. Thank you for this referral. 
Antonina Rodriguez, OTR/L Time Calculation: 29 mins

## 2020-11-03 NOTE — PROGRESS NOTES
Perfect Serve Consult for Admission 8:42 PM 
 
ED Room Number: WJ90/28 Patient Name and age:  Wyatt Camacho 69 y.o.  female Working Diagnosis: 1. Cellulitis of left lower extremity 2. PVD (peripheral vascular disease) ([facility] [address] 3. Failure of outpatient treatment COVID-19 Suspicion:  no 
Sepsis present:  no  Reassessment needed: no 
Code Status:  Full Code Readmission: no 
Isolation Requirements:  no 
Recommended Level of Care:  med/surg Department:[facility] - [phone] Other:  Pt with failed outpatient management of LE cellulitis. Patient is being admitted to the hospital.  The results of their tests and reasons for their admission have been discussed with them and/or available family. They convey agreement and understanding for the need to be admitted and for their admission diagnosis.

## 2020-11-04 NOTE — PROGRESS NOTES
Daily Progress Note: 11/4/2020 Jhony Fernandez MD 
 
Assessment/Plan:  
Cellulitis - reported in the LLE. Does have a very small wound over the left patellar region but not exquisitely warm or tender to touch. No expressible pus. CT above without abscess  
- f/u cultures- neg thus far 
- continue IV antibiotics - will ask ortho to eval- they have signed off - Will ask ID to see 
  
CAD (coronary artery disease) (10/9/2015) - not on ASA, statin 
- continue metoprolol  
  
Type II diabetes mellitus ([address] (10/9/2015) - ISS  
- BG checks AC TID and qHS  
- resume home meds at discharge  
  
Essential hypertension (1/22/2016) - continue metoprolol 
  
Recurrent deep vein thrombosis (DVT) ([address] (3/30/2018) - on Eliquis  
  
Chronic anticoagulation (3/30/2018) - Eliquis for a-fib and h/o DVT  
  
Sleep apnea (1/5/2019) - CPAP qHS  
  
Atrial fibrillation ([address] (11/16/2018) - on digoxin and metoprolol  
- continue eliquis  
  
COPD (chronic obstructive pulmonary disease) ([address] (7/13/2019) - chronically on O2  
- continue nebs  
  
PAD (peripheral artery disease) (HCC) (7/13/2019)/PVD 
- check LE dopplers  
  Severe obesity ([address] (7/30/2019) - counseled on weight loss  
  
Chronic respiratory failure with hypoxia ([facility] (11/2/2020) 
- on 3L  
  
  
 
Problem List: 
Problem List as of 11/4/2020 Date Reviewed: 11/2/2020 Codes Class Noted - Resolved Leukocytosis ICD-10-CM: P18.032 ICD-9-CM: 288.60  11/3/2020 - Present Chronic respiratory failure with hypoxia [facility]) ICD-10-CM: J96.11 
ICD-9-CM: 518.83, 799.02  11/2/2020 - Present Overview Signed 11/2/2020 10:12 PM by Gunjan Durán MD  
  On 3L NC at home Cellulitis of lower extremity ICD-10-CM: L03.119 ICD-9-CM: 682.6  3/23/2020 - Present ASO (arteriosclerosis obliterans) ICD-10-CM: I70.90 ICD-9-CM: 440.9  9/5/2019 - Present PVD (peripheral vascular disease) ([facility] ICD-10-CM: I73.9 ICD-9-CM: 443.9  8/1/2019 - Present Severe obesity ([address] ICD-10-CM: E66.01 
ICD-9-CM: 278.01  7/30/2019 - Present COPD (chronic obstructive pulmonary disease) (HCC) ICD-10-CM: J44.9 ICD-9-CM: 897  7/13/2019 - Present Normocytic anemia ICD-10-CM: D64.9 ICD-9-CM: 285.9  7/13/2019 - Present PAD (peripheral artery disease) (HCC) ICD-10-CM: I73.9 ICD-9-CM: 443.9  7/13/2019 - Present Mild intermittent asthma ICD-10-CM: J45.20 ICD-9-CM: 493.90  5/17/2019 - Present Brachial artery thrombus (HCC) ICD-10-CM: H32.2 ICD-9-CM: 444.21  4/26/2019 - Present Sleep apnea ICD-10-CM: G47.30 ICD-9-CM: 780.57  1/5/2019 - Present Overview Signed 1/5/2019  8:41 PM by Fany Mejia DO  
  wears CPAP Atrial fibrillation [facility]) ICD-10-CM: I48.91 
ICD-9-CM: 427.31  11/16/2018 - Present Obesity (BMI 30-39.9) (Chronic) ICD-10-CM: G63.1 ICD-9-CM: 278.00  11/13/2018 - Present Recurrent deep vein thrombosis (DVT) (HCC) ICD-10-CM: I82.409 ICD-9-CM: 453.40  3/30/2018 - Present Chronic anticoagulation (Chronic) ICD-10-CM: Z79.01 
ICD-9-CM: V58.61  3/30/2018 - Present Essential hypertension (Chronic) ICD-10-CM: I10 
ICD-9-CM: 401.9  1/22/2016 - Present CAD (coronary artery disease) ICD-10-CM: I25.10 ICD-9-CM: 414.00  10/9/2015 - Present Type II diabetes mellitus (HCC) (Chronic) ICD-10-CM: E11.9 ICD-9-CM: 250.00  10/9/2015 - Present RESOLVED: Syncope and collapse ICD-10-CM: R55 
ICD-9-CM: 780.2  9/29/2020 - 11/2/2020 RESOLVED: Cellulitis ICD-10-CM: L03.90 ICD-9-CM: 682.9  3/23/2020 - 5/29/2020 RESOLVED: Hypokalemia ICD-10-CM: E87.6 ICD-9-CM: 276.8  3/23/2020 - 5/29/2020 RESOLVED: Hyponatremia ICD-10-CM: E87.1 ICD-9-CM: 276.1  3/23/2020 - 5/29/2020 RESOLVED: Diarrhea ICD-10-CM: R19.7 ICD-9-CM: 787.91  3/23/2020 - 5/29/2020  RESOLVED: Syncope and collapse ICD-10-CM: R55 
 ICD-9-CM: 780.2  7/13/2019 - 5/29/2020 RESOLVED: UTI (urinary tract infection) ICD-10-CM: N39.0 ICD-9-CM: 599.0  7/13/2019 - 11/2/2020 * (Principal) RESOLVED: Sepsis ([facility] ICD-10-CM: A41.9 ICD-9-CM: 038.9, 995.91  7/13/2019 - 11/3/2020 RESOLVED: Leg wound, left ICD-10-CM: K53.124R ICD-9-CM: 891.0  7/13/2019 - 5/29/2020 RESOLVED: Leg laceration, right, initial encounter ICD-10-CM: B16.545T ICD-9-CM: 894.0  7/13/2019 - 5/29/2020 RESOLVED: Cellulitis of right upper arm ICD-10-CM: L03.113 ICD-9-CM: 682.3  5/17/2019 - 5/29/2020 RESOLVED: Gangrene of finger of right hand ([facility] ICD-10-CM: E85 
ICD-9-CM: 785.4  5/17/2019 - 11/2/2020 RESOLVED: Bowel obstruction ([facility] ICD-10-CM: F88.754 ICD-9-CM: 560.9  1/8/2019 - 5/29/2020 RESOLVED: Partial small bowel obstruction ([facility] ICD-10-CM: K56.600 ICD-9-CM: 560.9  1/5/2019 - 5/29/2020 RESOLVED: Dyspnea ICD-10-CM: R06.00 
ICD-9-CM: 786.09  11/13/2018 - 5/29/2020 RESOLVED: ARF (acute renal failure) (HCC) ICD-10-CM: N17.9 ICD-9-CM: 584.9  11/13/2018 - 5/29/2020 RESOLVED: Closed wedge compression fracture of T7 vertebra ([facility] ICD-10-CM: T78.550N ICD-9-CM: 805.2  11/13/2018 - 5/29/2020 RESOLVED: Type 2 diabetes with nephropathy ([facility] ICD-10-CM: E11.21 
ICD-9-CM: 250.40, 583.81  10/1/2018 - 11/2/2020 RESOLVED: Chest pain ICD-10-CM: R07.9 ICD-9-CM: 786.50  6/27/2018 - 5/29/2020 RESOLVED: Abdominal pain ICD-10-CM: R10.9 ICD-9-CM: 789.00  6/27/2018 - 5/29/2020 RESOLVED: Coronary artery disease involving native coronary artery without angina pectoris (Chronic) ICD-10-CM: I25.10 ICD-9-CM: 414.01  1/22/2016 - 11/2/2020 RESOLVED: HTN (hypertension) ICD-10-CM: I10 
ICD-9-CM: 401.9  10/9/2015 - 1/22/2016 RESOLVED: NSTEMI (non-ST elevated myocardial infarction) ([facility] ICD-10-CM: I21.4 ICD-9-CM: 410.70  10/9/2015 - 5/29/2020 HPI:  
 Ms. Joseph Cai is a 69 y.o.  female with PMH of a-fib, CAD, DM, asthma, h/p DVT admitted for LLE \"cellulitis\". Per pt was sent here by her wound care doctor for further evaluation. Denies fevers/chills. No N/V. Has noted some drainage from her L knee wound. Does to have hardware in her L leg  (Dr Stef Joaquin) 
 
11/3: Has been at [facility] in the [facility] for the last 2 weeks. Sent to the ER as she was having more drainage from her knee. No other complaints. : Ortho consult completed and do not feel joint is septic. Will ask ID to see. Cultures neg so far but was on antibiotics prior to admission. She has no complaints at this time. Dopplers not done yet so will reorder. Review of Systems: A comprehensive review of systems was negative except for that written in the HPI. Objective:  
Physical Exam:  
 
Visit Vitals /74 (BP 1 Location: Left arm, BP Patient Position: At rest) Pulse 93 Temp 97.9 °F (36.6 °C) Resp 16 Wt 255 lb (115.7 kg) SpO2 99% BMI 39.94 kg/m² O2 Flow Rate (L/min): 3 l/min O2 Device: Nasal cannula Temp (24hrs), Av °F (36.7 °C), Min:97.6 °F (36.4 °C), Max:98.2 °F (36.8 °C) No intake/output data recorded.  0701 -  1900 In: 2187.5 [P.O.:1040; I.V.:1147.5] Out: - General:  Alert, cooperative, no distress Obese Head:  Normocephalic, without obvious abnormality, atraumatic. Eyes:  Conjunctivae/corneas clear. PERRL, EOMs intact. Nose: Nares normal. Septum midline. Mucosa normal. No drainage or sinus tenderness. Throat: Lips, mucosa, and tongue normal.   
Neck: Supple, symmetrical, trachea midline, no adenopathy, thyroid: no enlargement/tenderness/nodules, no carotid bruit and no JVD. Back:   Symmetric, no curvature. ROM normal. No CVA tenderness. Lungs:   Clear to auscultation bilaterally. Chest wall:  No tenderness or deformity. Heart:  Regular rate and rhythm, S1, S2 normal, no murmur, click, rub or gallop. Abdomen:   Soft, non-tender. Bowel sounds normal. No masses,  No organomegaly. Extremities: Extremities swollen and weeping, erythematous bilaterally, left knee with small amount of drainage present on bandage, both LE scaly and dry,  atraumatic, no cyanosis  No calf tenderness or cords. Pulses: 1+ and symmetric all extremities. Skin: Skin turgor normal.  
Neurologic: CNII-XII intact. Alert and oriented X 3. Fine motor of hands and fingers normal.   equal.  No cogwheeling or rigidity. Gait not tested at this time. Sensation grossly normal to touch. Gross motor of extremities normal.    
 
Data Review: CT LE 11/2/20 FINDINGS: The bones are mildly osteopenic. There is no fracture or dislocation 
or osseous attenuation abnormality to suggest osteomyelitis. 
  
There is no joint prosthesis demonstrated. There is no substantial knee or ankle 
arthrosis. No knee1 or ankle effusion is shown. 
  
The muscles are normal in size and attenuation. No compartmental mass or 
collection is shown. There are scattered atherosclerotic calcifications which 
are greater on the left especially at the proximal left popliteal artery. 
  
There is diffuse bilateral subcutaneous strand-like opacification with areas of 
subdermal confluence anterolaterally on the left at the level of the knee. Diffuse skin thickening is shown. 
  
IMPRESSION:  
Diffuse bilateral subcutaneous strand-like opacification which could be on 
inflammatory basis or related to chronic venous stasis and lymphedema. There are 
areas of subdermal confluence of nonspecific nature anteromedially at the level 
of the knee. There is no localized intramuscular collection, joint effusion, or 
CT imaging evidence for osteomyelitis. Recent Days: 
Recent Labs 11/02/20 
1626 WBC 13.1* HGB 10.0* HCT 34.4*  
 Recent Labs 11/03/20 
0509 11/02/20 
1626  143  
K 4.4 4.2 * 111* CO2 24 26 * 132* BUN 17 17 CREA 0.89 1.05* CA 7.9* 8.1* ALB  --  2.3* TBILI  --  0.5 ALT  --  50  
 
 
24 Hour Results: 
Recent Results (from the past 24 hour(s)) GLUCOSE, POC Collection Time: 11/03/20  6:46 AM  
Result Value Ref Range Glucose (POC) 112 (H) 65 - 100 mg/dL Performed by Jeannie Marcelo, POC Collection Time: 11/03/20 11:40 AM  
Result Value Ref Range Glucose (POC) 103 (H) 65 - 100 mg/dL Performed by Faustinadeandre Rodrigues (PCT) GLUCOSE, POC Collection Time: 11/03/20  4:49 PM  
Result Value Ref Range Glucose (POC) 153 (H) 65 - 100 mg/dL Performed by Faustina Rodrigues (PCT) GLUCOSE, POC Collection Time: 11/03/20  9:43 PM  
Result Value Ref Range Glucose (POC) 162 (H) 65 - 100 mg/dL Performed by Johnathon Coronado Medications reviewed Current Facility-Administered Medications Medication Dose Route Frequency  Vancomycin - Please draw trough level prior to dose due on 11/4 at 1700. Thanks! Other ONCE  
 sodium chloride (NS) flush 5-40 mL  5-40 mL IntraVENous Q8H  
 sodium chloride (NS) flush 5-40 mL  5-40 mL IntraVENous PRN  
 acetaminophen (TYLENOL) tablet 650 mg  650 mg Oral Q4H PRN  
 HYDROcodone-acetaminophen (NORCO) 5-325 mg per tablet 1 Tab  1 Tab Oral Q4H PRN  
 naloxone (NARCAN) injection 0.4 mg  0.4 mg IntraVENous PRN  
 ondansetron (ZOFRAN) injection 4 mg  4 mg IntraVENous Q4H PRN  
 glucose chewable tablet 16 g  4 Tab Oral PRN  
 dextrose (D50W) injection syrg 12.5-25 g  25-50 mL IntraVENous PRN  
 glucagon (GLUCAGEN) injection 1 mg  1 mg IntraMUSCular PRN  
 insulin lispro (HUMALOG) injection   SubCUTAneous AC&HS  vancomycin (VANCOCIN) 1500 mg in  ml infusion  1,500 mg IntraVENous Q24H  cefepime (MAXIPIME) 2 g in sterile water (preservative free) 10 mL IV syringe  2 g IntraVENous Q8H  
 apixaban (ELIQUIS) tablet 5 mg  5 mg Oral BID  cholecalciferol (VITAMIN D3) (1000 Units /25 mcg) tablet 2 Tab  2,000 Units Oral DAILY  cyanocobalamin (VITAMIN B12) tablet 1,000 mcg  1,000 mcg Oral DAILY  digoxin (LANOXIN) tablet 0.125 mg  0.125 mg Oral DAILY  DULoxetine (CYMBALTA) capsule 60 mg  60 mg Oral DAILY  ferrous sulfate tablet 325 mg  325 mg Oral DAILY WITH BREAKFAST  lactobac ac& pc-s.therm-b.anim (NUBIA Q/RISAQUAD)  1 Cap Oral DAILY  loperamide (IMODIUM) capsule 4 mg  4 mg Oral BID  magnesium oxide (MAG-OX) tablet 400 mg  400 mg Oral QHS  mirtazapine (REMERON) tablet 15 mg  15 mg Oral QHS  arformoterol 15 mcg/budesonide 0.5 mg neb solution   Nebulization BID RT  
 montelukast (SINGULAIR) tablet 10 mg  10 mg Oral QPM  
 pantoprazole (PROTONIX) tablet 40 mg  40 mg Oral ACB&D  potassium chloride SR (KLOR-CON 10) tablet 10 mEq  10 mEq Oral BID  predniSONE (DELTASONE) tablet 10 mg  10 mg Oral BID WITH MEALS  
 albuterol-ipratropium (DUO-NEB) 2.5 MG-0.5 MG/3 ML  3 mL Nebulization Q6H PRN  
 albuterol (ACCUNEB) nebulizer solution 1.25 mg  1.25 mg Nebulization Q2H PRN Care Plan discussed with: Patient/Family Total time spent with patient and review of records: 30 minutes.  
 
Micaela Mane MD

## 2020-11-04 NOTE — PROGRESS NOTES
Bedside and Verbal shift change report given to [facility] RN (oncoming nurse) by Amadeo Wood RN (offgoing nurse). Report included the following information SBAR, Intake/Output, MAR and Recent Results.

## 2020-11-04 NOTE — PROGRESS NOTES
11:26 AM 
Transition of care note: 
 
Transition of Care Plan 1. Continue IV abx for now- ID consulted 2. Patient plans to return to [address] [place] [address] for rehab- [employer] [facility] [employer] letter completed with daughter and placed in chart- referral sent to [facility] via [employer]. Spoke to MD regarding need for 2 negative COVID tests 24 hours apart- pt may also need a CXR for dc 3. Daughter or shuttle will transport- may need BLS depending on pt's physical status 4. Patient will need to follow up with MD 
5. CM will continue to follow Char Burger

## 2020-11-04 NOTE — PROGRESS NOTES
Bedside and Verbal shift change report given to Justina Plaza RN (oncoming nurse) by Ac Mederos RN (offgoing nurse). Report included the following information SBAR, Kardex and MAR.

## 2020-11-04 NOTE — CONSULTS
Infectious Disease Consult Impression/Plan · Left lower extremity cellulitis. Orthopedic surgery following. No evidence of septic joint or prepatellar bursitis. No fluid collection or osteomyelitis on CT scan. Procalcitonin and CRP within normal limits. ESR mildly elevated but corrects to normal taking age into account. Cultures growing mixed skin jazmine. No obvious signs of infection at the time of examination. Was just on a prolonged course of doxycycline and Augmentin in the outpatient setting. Currently improving on vancomycin and cefepime. Not a candidate for linezolid as she is on Cymbalta. Change antibiotics to ceftriaxone. If she continues to improve may discharge on cefdinir 300 mg p.o. twice daily for another 7 days · Chronic immunosuppression. On prednisone 10 mg twice daily for temporal arteritis · Leukocytosis. This appears to be at least in part chronic. Follow trend · Diabetes mellitus · Ciprofloxacin, sulfa, and tetracycline allergies Anti-infectives: 1. Ceftriaxone Subjective:  
Date of Consultation:  November 4, 2020 Date of Admission: 11/2/2020 Referring Physician: Ms. Mayelin Fry is a 69 y.o.  female with PMH of a-fib, CAD, DM, asthma, h/p DVT admitted for LLE \"cellulitis\". Per pt was sent here by her wound care doctor for further evaluation. Denies fevers/chills. No N/V. Has noted some drainage from her L knee wound. Has been on Augmentin and doxycycline over the last 2 weeks with no improvement. Per patient she DOES NOT have hardware in the left. She is currently on vancomycin and cefepime. There has been significant improvement in the left knee erythema. The infectious diseases service has been asked to assist with antibiotic management Patient Active Problem List  
Diagnosis Code  CAD (coronary artery disease) I25.10  Type II diabetes mellitus ([facility] [address] E11.9  
 Essential hypertension I10  
  Recurrent deep vein thrombosis (DVT) ([facility]) I82.409  Chronic anticoagulation Z79.01  
 Obesity (BMI 30-39. 9) E66.9  Sleep apnea G47.30  Atrial fibrillation ([facility]) I48.91  
 Brachial artery thrombus ([facility]) I74.2  Mild intermittent asthma J45.20  
 COPD (chronic obstructive pulmonary disease) ([facility]) J44.9  Normocytic anemia D64.9  
 PAD (peripheral artery disease) ([facility]) I73.9  Severe obesity ([facility]) E66.01  
 PVD (peripheral vascular disease) ([facility]) I73.9  ASO (arteriosclerosis obliterans) I70.90  Cellulitis of lower extremity L03.119  
 Chronic respiratory failure with hypoxia ([facility]) J96.11  
 Leukocytosis D72.829 Past Medical History:  
Diagnosis Date  Asthma  Atrial fibrillation ([facility] [address] 11/16/2018  Autoimmune disease ([facility] [address]   
 fibromyalgia  CAD (coronary artery disease) 10/9/2015  Closed wedge compression fracture of T7 vertebra ([facility] [address] 11/13/2018  Diabetes ([facility] [address]  DVT (deep vein thrombosis) in pregnancy  GERD (gastroesophageal reflux disease)  Heart failure ([facility] [address]  History of vascular access device 09/30/2020  
 4f midline, 12cm at 1cm out placed in L Cephalic by Alena Reyes RN  
 HTN (hypertension)  NSTEMI (non-ST elevated myocardial infarction) ([facility] [address] 10/9/2015  Obesity (BMI 30-39.9) 11/13/2018  Sleep apnea   
 wears CPAP Family History Problem Relation Age of Onset  Diabetes Mother  Heart Failure Mother  Kidney Disease Mother  Diabetes Father  Heart Disease Father  Elevated Lipids Father  Heart Failure Father  Heart Disease Brother  Cancer Brother   
     kidney  Diabetes Brother  No Known Problems Brother  No Known Problems Brother Social History Tobacco Use  Smoking status: Former Smoker Last attempt to quit: 3/30/2017 Years since quitting: 3.6  Smokeless tobacco: Never Used Substance Use Topics  Alcohol use: No  
  Alcohol/week: 0.0 standard drinks Comment: very very rarely Past Surgical History:  
Procedure Laterality Date  ABDOMEN SURGERY PROC UNLISTED    
 hital hernia repair, gall bladder removed  AMPUTATION TRANSMETACARPAL Right 06/12/2019  
 entire ring finger, 2nd & 3rd fingers (transmetacarpal)  BREAST SURGERY PROCEDURE UNLISTED    
 lumpectomy  CARDIAC SURG PROCEDURE UNLIST  10/9/15  
 2 stents [place]  HX COLOSTOMY    
 and reversal, post episiotomy repair [address] [facility] UROLOGICAL  2009  
 bladder sling  IR KYPHOPLASTY LUMBAR  10/8/2020 Prior to Admission medications Medication Sig Start Date End Date Taking? Authorizing Provider  
apixaban (ELIQUIS) 5 mg tablet Take 5 mg by mouth two (2) times a day. Yes Provider, Historical  
alendronate (FOSAMAX) 70 mg tablet Take 70 mg by mouth every thirty (30) days. On the 1st day of each month   Yes Provider, Historical  
ferrous sulfate (Slow Fe) 142 mg (45 mg iron) ER tablet Take 142 mg by mouth Daily (before breakfast). Yes Provider, Historical  
zinc oxide 20 % ointment Apply  to affected area three (3) times daily. Bilateral gluteal and perianal area each shift   Yes Provider, Historical  
loperamide (IMODIUM) 2 mg capsule Take 4 mg by mouth two (2) times a day. Yes Provider, Historical  
honey (MediHoney, honey,) 100 % topical paste Apply  to affected area daily. Yes Provider, Historical  
cyanocobalamin 1,000 mcg tablet Take 1 Tab by mouth daily. 10/13/20  Yes Magali Coto MD  
digoxin (LANOXIN) 0.125 mg tablet Take 1 Tab by mouth daily. 10/13/20  Yes Magali Coto MD  
ferrous sulfate 325 mg (65 mg iron) tablet Take 1 Tab by mouth daily (with breakfast). 10/13/20  Yes Magali Coto MD  
tiotropium bromide (Spiriva Respimat) 2.5 mcg/actuation inhaler Take 2 Puffs by inhalation daily. Yes Provider, Historical  
montelukast (Singulair) 10 mg tablet Take 10 mg by mouth every evening. Yes Provider, Historical  
sAXagliptin (ONGLYZA) 5 mg tab tablet Take 5 mg by mouth Daily (before dinner). Yes Provider, Historical  
metoprolol tartrate (LOPRESSOR) 25 mg tablet Take 25 mg by mouth daily as needed (For systolic >681). Yes Provider, Historical  
acetaminophen (TYLENOL) 325 mg tablet Take 1 Tab by mouth every four (4) hours as needed for Pain. 3/31/20  Yes Grover Rodarte MD  
magnesium oxide (MAG-OX) 400 mg tablet Take 400 mg by mouth nightly. Yes Provider, Historical  
potassium chloride SR (KLOR-CON 10) 10 mEq tablet Take 10 mEq by mouth two (2) times a day. Yes Provider, Historical  
predniSONE (DELTASONE) 10 mg tablet Take 10 mg by mouth two (2) times daily (with meals). Yes Provider, Historical  
Omeprazole delayed release (PRILOSEC D/R) 20 mg tablet Take 20 mg by mouth two (2) times a day. Yes Provider, Historical  
promethazine (PHENERGAN) 25 mg tablet Take 25 mg by mouth every eight (8) hours as needed for Nausea (Nausea not relieved by ondansetron). Yes Provider, Historical  
mirtazapine (REMERON) 15 mg tablet Take 15 mg by mouth nightly. Yes Provider, Historical  
albuterol (PROVENTIL VENTOLIN) 2.5 mg /3 mL (0.083 %) nebulizer solution 2.5 mg by Nebulization route every six (6) hours as needed for Wheezing or Shortness of Breath. Yes Provider, Historical  
albuterol (PROAIR HFA) 90 mcg/actuation inhaler Take 2 Puffs by inhalation every four (4) hours as needed. Yes Provider, Historical  
lactobacillus rhamnosus gg 10 billion cell (CULTURELLE) 10 billion cell capsule Take 1 Cap by mouth daily. Yes Provider, Historical  
biotin 10,000 mcg cap Take 1 Cap by mouth daily. Yes Provider, Historical  
DULoxetine (CYMBALTA) 60 mg capsule Take 60 mg by mouth daily. Yes Provider, Historical  
cholecalciferol, vitamin D3, (Vitamin D3) 50 mcg (2,000 unit) tab Take 2,000 Units by mouth daily.    Yes Provider, Historical  
 azelastine-fluticasone (DYMISTA) 137-50 mcg/spray spry 2 Sprays by Nasal route daily as needed (allergies). Yes Provider, Historical  
mometasone-formoterol (DULERA) 200-5 mcg/actuation HFA inhaler Take 2 Puffs by inhalation two (2) times daily as needed. Yes Provider, Historical  
 
Allergies Allergen Reactions  Advair Diskus [Fluticasone Propion-Salmeterol] Rash  Aspartame Other (comments)  Breo Ellipta [Fluticasone Furoate-Vilanterol] Rash  Bumex [Bumetanide] Myalgia  Ciprofloxacin Rash  Statins-Hmg-Coa Reductase Inhibitors Other (comments) Muscle pain Lipitor/crestor/zocor  Sulfa (Sulfonamide Antibiotics) Rash  Tetracycline Other (comments) musclepain  Torsemide Rash and Myalgia  Zetia [Ezetimibe] Myalgia Review of Systems:  A comprehensive review of systems was negative except for that written in the History of Present Illness. Objective:  
Blood pressure (!) 149/66, pulse 95, temperature 98.3 °F (36.8 °C), resp. rate 14, weight 255 lb (115.7 kg), SpO2 93 %. Temp (24hrs), Av °F (36.7 °C), Min:97.6 °F (36.4 °C), Max:98.3 °F (36.8 °C) Exam:   
General:  Alert, cooperative, well noursished, well developed, appears stated age Eyes:  Sclera anicteric. Pupils equally round and reactive to light. Mouth/Throat: Mucous membranes moist  
Neck: Supple Lungs:   Clear to auscultation anteriorly CV:   RRR Abdomen:    Obese, nondistended Extremities:  Chronic lower extremity lymphedema Skin:  See image above. No rash Lymph nodes: Musculoskeletal:   
Lines/Devices:   Peripheral  
Psych: Alert and oriented, normal mood affect given the setting Data Review:  
Recent Results (from the past 24 hour(s)) GLUCOSE, POC Collection Time: 20 11:40 AM  
Result Value Ref Range Glucose (POC) 103 (H) 65 - 100 mg/dL Performed by Shelia Wong (PCT) GLUCOSE, POC Collection Time: 20  4:49 PM  
Result Value Ref Range Glucose (POC) 153 (H) 65 - 100 mg/dL Performed by Alton Myrick (PCT) GLUCOSE, POC Collection Time: 11/03/20  9:43 PM  
Result Value Ref Range Glucose (POC) 162 (H) 65 - 100 mg/dL Performed by [email or web address] Collection Time: 11/04/20  6:28 AM  
Result Value Ref Range SAMPLES BEING HELD 1sst   
 COMMENT Add-on orders for these samples will be processed based on acceptable specimen integrity and analyte stability, which may vary by analyte. GLUCOSE, POC Collection Time: 11/04/20  7:13 AM  
Result Value Ref Range Glucose (POC) 117 (H) 65 - 100 mg/dL Performed by Shannan Doherty Microbiology:   
 
Studies:   
 
Signed By: Omar Guzman DO November 4, 2020

## 2020-11-04 NOTE — PROGRESS NOTES
Problem: Mobility Impaired (Adult and Pediatric) Goal: *Acute Goals and Plan of Care (Insert Text) Description: FUNCTIONAL STATUS PRIOR TO ADMISSION: Patient required minimal assistance for basic and instrumental ADLs. HOME SUPPORT PRIOR TO ADMISSION: The patient was in rehab. Physical Therapy Goals Initiated 11/3/2020 1. Patient will move from supine to sit and sit to supine , scoot up and down, and roll side to side in bed with modified independence within 7 day(s). 2.  Patient will transfer from bed to chair and chair to bed with modified independence using the least restrictive device within 7 day(s). 3.  Patient will perform sit to stand with modified independence within 7 day(s). 4.  Patient will ambulate with modified independence for 50 feet with the least restrictive device within 7 day(s). Outcome: Progressing Towards Goal 
 PHYSICAL THERAPY TREATMENT Patient: Cindy Montelongo (68 y.o. female) Date: 11/4/2020 Diagnosis: Sepsis ([facility] [address] [A41.9] Sepsis ([facility] [address] Precautions:  fall Chart, physical therapy assessment, plan of care and goals were reviewed. ASSESSMENT Patient continues with skilled PT services and is progressing towards goals. Communicated with nurse cleared for therapy. Rolled on the edge of bed min assist, sit to stand min assist, ambulate with rolling walker in the room and around the bed multiple times. No loss of balance steady sitting and standing. OOB to chair as tolerated performed some gentle active range of motion exercise on both LE while up on the chair. Patient reported she had a hard time getting out of the commode last night searched for a bedside commode and place it on the bathroom. Communicated with nurse who agreed to monitor patient. Current Level of Function Impacting Discharge (mobility/balance): CGA with transfers and ambulation using a rolling walker Other factors to consider for discharge: pain PLAN : 
 Patient continues to benefit from skilled intervention to address the above impairments. Continue treatment per established plan of care. to address goals. Recommendation for discharge: (in order for the patient to meet his/her long term goals) Therapy 3 hours per day 5-7 days per week This discharge recommendation: 
Has been made in collaboration with the attending provider and/or case management IF patient discharges home will need the following DME: patient owns DME required for discharge SUBJECTIVE:  
Patient stated ok.  OBJECTIVE DATA SUMMARY:  
Critical Behavior: 
  
  
  
  
Functional Mobility Training: 
Bed Mobility: 
Rolling: Minimum assistance Supine to Sit: Minimum assistance Sit to Supine: Minimum assistance Scooting: Minimum assistance Transfers: 
Sit to Stand: Contact guard assistance Stand to Sit: Contact guard assistance Stand Pivot Transfers: Contact guard assistance Bed to Chair: Contact guard assistance Balance: 
Sitting: Intact Standing: Intact; With support Standing - Static: Fair Standing - Dynamic : Fair Ambulation/Gait Training: 
Distance (ft): 30 Feet (ft) Assistive Device: Walker, rolling;Gait belt Ambulation - Level of Assistance: Contact guard assistance Gait Description (WDL): Exceptions to [facility] Gait Abnormalities: Path deviations Base of Support: Widened Speed/Es: Pace decreased (<100 feet/min) Therapeutic Exercises:  
 Instructed patient to continue gentle active range of motion exercise on both legs while up on chair or on bed. Pain Ratin/10 Activity Tolerance:  
Good After treatment patient left in no apparent distress:  
Sitting in chair, Heels elevated for pressure relief, and Call bell within reach COMMUNICATION/COLLABORATION:  
The patients plan of care was discussed with: Registered nurse and Certified nursing assistant/patient care technician. Donna Ray, PT,WCC. Time Calculation: 28 mins

## 2020-11-05 NOTE — PROGRESS NOTES
Physician Progress Note Jian Medina 
CSN #:                  [id number] :                       1945 ADMIT DATE:       2020 1:45 PM 
[address] [place] DATE: 
RESPONDING 
PROVIDER #:        Neelam Hendrickson MD 
 
 
 
 
QUERY TEXT: 
 
Dear Dr. Zuleyma Cho, Patient admitted with LLE cellulitis, noted to have atrial fibrillation. If possible, please document in progress notes and discharge summary further specificity regarding the type of atrial fibrillation: The medical record reflects the following: 
Risk Factors: 69 yr. old female admitted with LLE cellulitis. Clinical Indicators: HR 63 to 92, On -on digoxin and Metoprolol  and Eliquis Treatment: Digoxin Metoprolol and Eliquis? Chronic: nonspecific term that could be referring to paroxysmal, persistent, or permanent Longstanding persistent: persistent and continuous, lasting > 1 year. Paroxysmal - self-terminating or intermittent; resolves with or without intervention within 7 days of onset; may recur with various frequency. Persistent - Fails to terminate within 7 days; Often requires meds or cardioversion to restore to NSR. Permanent - longstanding & persistent; Medication has been ineffective in restoring NSR &/or cardioversion is contraindicated Definitions per MS-DRG Training Guide and Quick Reference Guide, Justice [address] 5 Diseases and Disorders of the Circulatory System; 2019; [employer]. Software content from the [employer]? [employer]. 
Options provided: 
-- Paroxysmal Atrial Fibrillation 
-- Longstanding Persistent Atrial Fibrillation -- Permanent Atrial Fibrillation -- Persistent Atrial Fibrillation -- Chronic Atrial Fibrillation, unspecified 
-- Other - I will add my own diagnosis -- Disagree - Not applicable / Not valid -- Disagree - Clinically unable to determine / Unknown 
-- Refer to Clinical Documentation Reviewer PROVIDER RESPONSE TEXT: 
 
 This patient has longstanding persistent atrial fibrillation. Query created by: Rony Joaquin on 11/3/2020 4:46 PM 
 
 
QUERY TEXT: 
 
Dear Dr. Dioni Rodríguez, Pt admitted with LLE cellulitis. Pt noted to have DM type 2, if documented). If possible, please document in progress notes and discharge summary the relationship, if any, between cellulitis and DM. The medical record reflects the following: 
Risk Factors: 69 yr. old female admitted with LLE cellulitis and hx of type 2 DM. Clinical Indicators: LLE cellulitis and hx of DM type 2. Blood glucose levels 132 and 125. Treatment: *Blood sugars, Sliding scale insulin** Options provided: 
-- LLE cellulitis due to Diabetes -- LLE cellulitis unrelated to Diabetes -- Other - I will add my own diagnosis -- Disagree - Not applicable / Not valid -- Disagree - Clinically unable to determine / Unknown 
-- Refer to Clinical Documentation Reviewer PROVIDER RESPONSE TEXT: 
 
LLE cellulitis unrelated to Diabetes.  
 
Query created by: Rony Joaquin on 11/3/2020 5:00 PM 
 
 
Electronically signed by:  Reji Jones MD 11/5/2020 8:08 AM

## 2020-11-05 NOTE — PROGRESS NOTES
Problem: Self Care Deficits Care Plan (Adult) Goal: *Acute Goals and Plan of Care (Insert Text) Description:  
FUNCTIONAL STATUS PRIOR TO ADMISSION: Patient has been at rehab facility since last hospital admission HOME SUPPORT: The patient lived alone at baseline. Occupational Therapy Goals Initiated 11/3/2020 1. Patient will perform lower body dressing with supervision/set-up within 7 day(s). 2.  Patient will perform grooming, standing at sink, with supervision/set-up within 7 day(s). 3.  Patient will perform toilet transfers with supervision/set-up within 7 day(s). 4.  Patient will perform all aspects of toileting with supervision/set-up within 7 day(s). 5.  Patient will participate in upper extremity therapeutic exercise/activities with supervision/set-up for 10 minutes within 7 day(s). 6.  Patient will utilize energy conservation techniques during functional activities with verbal cues within 7 day(s). Outcome: Progressing Towards Goal 
 OCCUPATIONAL THERAPY TREATMENT Patient: Ana Rowell (89 y.o. female) Date: 11/5/2020 Diagnosis: Sepsis ([facility] [address] [A41.9] Sepsis ([facility] [address] Precautions:   
Chart, occupational therapy assessment, plan of care, and goals were reviewed. ASSESSMENT Patient continues with skilled OT services and is progressing towards goals. Pt needing to use commode. She ambulated to [facility] with assist x 1, able to perform her own hygiene, pt left seated in chair. O2 sats 99% on 3 L,  after activity. Current Level of Function Impacting Discharge (ADLs): Min assist BSC transfer, contact guard hygiene Other factors to consider for discharge: PLAN : 
Patient continues to benefit from skilled intervention to address the above impairments. Continue treatment per established plan of care. to address goals. Recommend with staff: Out of bed to chair for ADL's, meals Recommend next OT session: cont towards goals Recommendation for discharge: (in order for the patient to meet his/her long term goals) Therapy up to 5 days/week in SNF setting This discharge recommendation: 
Has not yet been discussed the attending provider and/or case management IF patient discharges home will need the following DME:pt has DME SUBJECTIVE:  
Patient stated It does feel good to get up OBJECTIVE DATA SUMMARY:  
Cognitive/Behavioral Status: 
Neurologic State: Alert Orientation Level: Oriented X4 Cognition: Appropriate decision making Functional Mobility and Transfers for ADLs: 
Bed Mobility: 
 Min assist supine to sit Transfers: 
  
Functional Transfers Toilet Transfer : Minimum assistance Adaptive Equipment: Bedside commode;Walker (comment) Balance: 
Sitting: Intact Standing: Intact; With support ADL Intervention: Toileting Bladder Hygiene: Contact guard assistance Activity Tolerance:  
Fair After treatment patient left in no apparent distress:  
Sitting in chair COMMUNICATION/COLLABORATION:  
The patients plan of care was discussed with: Occupational therapist and Registered nurse. EZRA Cui Time Calculation: 15 mins

## 2020-11-05 NOTE — PROGRESS NOTES
3:41 PM 
 
RUR:  32% 
  
Transition of Care Plan 1. ID consulted 2. Patient plans to return to [address] [place] [address] for rehab- Sent updated notes to facility and test results-COVID/CXR 2a. Awaiting results of 2nd COVID test 
3. Daughter or shuttle will transport- may need BLS depending on pt's physical status 4. Patient will need to follow up with MD 
5.  CM will continue to follow

## 2020-11-05 NOTE — PROGRESS NOTES
[facility] Progress Note Ester Sterling DO 
  [phone] Office [phone]  Fax 2020 Assessment & Plan: · Left lower extremity cellulitis. Orthopedic surgery following. No evidence of septic joint or prepatellar bursitis. No fluid collection or osteomyelitis on CT scan. Procalcitonin and CRP within normal limits. ESR mildly elevated but corrects to normal taking age into account. Cultures growing mixed skin jazmine. No obvious signs of infection at the time of examination. Was just on a prolonged course of doxycycline and Augmentin in the outpatient setting. Antibiotics narrowed to ceftriaxone. If she continues to improve may discharge on cefdinir 300 mg p.o. twice daily for another 5 days · GPC bacteremia. Isolated in 1 out of 4 bottles. Suspect contaminant. Repeat blood cultures today · Chronic immunosuppression. On prednisone 10 mg twice daily for temporal arteritis · Leukocytosis. This appears to be at least in part chronic. Follow trend · Diabetes mellitus · Ciprofloxacin, sulfa, and tetracycline allergies Subjective: No complaints. Leg feeling better Objective:  
 
Vitals:  
Visit Vitals /73 (BP 1 Location: Left arm, BP Patient Position: At rest) Pulse 83 Temp 98.3 °F (36.8 °C) Resp 17 Wt 255 lb (115.7 kg) SpO2 96% BMI 39.94 kg/m² Tmax:  Temp (24hrs), Av °F (36.7 °C), Min:97.6 °F (36.4 °C), Max:98.4 °F (36.9 °C) Exam:  
Patient is intubated:  no 
 
Physical Examination:  
General:  Alert, cooperative, no distress Head:  Normocephalic, atraumatic. Eyes:  Conjunctivae clear Neck: Supple Lungs:   No distress. Chest wall:    
Heart:    
Abdomen:    Obese, nondistended Extremities: Moves all. Chronic lower extremity lymphedema Skin:  No significant erythema at previous cellulitic area on the left knee Neurologic: CNII-XII intact. Normal strength Labs: No lab exists for component: ITNL No results for input(s): CPK, CKMB, TROIQ in the last 72 hours. Recent Labs 11/05/20 
0510 11/03/20 
0509 11/02/20 
1626  142 143  
K 4.8 4.4 4.2 * 112* 111* CO2 26 24 26 BUN 18 17 17 CREA 1.02 0.89 1.05* * 125* 132* ALB 2.2*  --  2.3* WBC 10.6  --  13.1* HGB 9.4*  --  10.0* HCT 32.6*  --  34.4*  
  --  257 No results for input(s): INR, PTP, APTT, INREXT in the last 72 hours. Needs: urine analysis, urine sodium, protein and creatinine No results found for: Rhae Zambrano Cultures: No results found for: SDES Lab Results Component Value Date/Time Culture result: LIGHT MIXED SKIN NUBIA ISOLATED 11/02/2020 07:25 PM  
 Culture result: (A) 11/02/2020 04:26 PM  
  GRAM POSITIVE COCCI IN CLUSTERS GROWING IN 1 OF 4 BOTTLES DRAWN SITE=(NOT INDICATED) Culture result: (A) 11/02/2020 04:26 PM  
  PRELIMINARY REPORT OF GRAM POSITIVE COCCI IN CLUSTERS GROWING IN 1 OF 4 BOTTLES DRAWN CALLED TO AND READ BACK BY CRISPIN Edmonds 52 RN [facility] AT 1912 ON 11/4/20. Jimenez 1850 Culture result: REMAINING BOTTLE(S) HAS/HAVE NO GROWTH SO FAR 11/02/2020 04:26 PM  
 
 
Radiology:  
 
Medications Current Facility-Administered Medications Medication Dose Route Frequency Last Dose  cefTRIAXone (ROCEPHIN) 2 g in sterile water (preservative free) 20 mL IV syringe  2 g IntraVENous Q24H 2 g at 11/04/20 1855  sodium chloride (NS) flush 5-40 mL  5-40 mL IntraVENous Q8H 10 mL at 11/05/20 0545  sodium chloride (NS) flush 5-40 mL  5-40 mL IntraVENous PRN    
 acetaminophen (TYLENOL) tablet 650 mg  650 mg Oral Q4H PRN    
 HYDROcodone-acetaminophen (NORCO) 5-325 mg per tablet 1 Tab  1 Tab Oral Q4H PRN    
 naloxone (NARCAN) injection 0.4 mg  0.4 mg IntraVENous PRN    
 ondansetron (ZOFRAN) injection 4 mg  4 mg IntraVENous Q4H PRN    
 glucose chewable tablet 16 g  4 Tab Oral PRN    
  dextrose (D50W) injection syrg 12.5-25 g  25-50 mL IntraVENous PRN    
 glucagon (GLUCAGEN) injection 1 mg  1 mg IntraMUSCular PRN    
 insulin lispro (HUMALOG) injection   SubCUTAneous AC&HS Stopped at 11/04/20 2221  apixaban (ELIQUIS) tablet 5 mg  5 mg Oral BID 5 mg at 11/05/20 0946  cholecalciferol (VITAMIN D3) (1000 Units /25 mcg) tablet 2 Tab  2,000 Units Oral DAILY 2 Tab at 11/05/20 4256  cyanocobalamin (VITAMIN B12) tablet 1,000 mcg  1,000 mcg Oral DAILY 1,000 mcg at 11/05/20 8916  digoxin (LANOXIN) tablet 0.125 mg  0.125 mg Oral DAILY 0.125 mg at 11/05/20 0631  DULoxetine (CYMBALTA) capsule 60 mg  60 mg Oral DAILY 60 mg at 11/05/20 0946  ferrous sulfate tablet 325 mg  325 mg Oral DAILY WITH BREAKFAST 325 mg at 11/05/20 0947  
 lactobac ac& pc-s.therm-b.anim (NUBIA Q/RISAQUAD)  1 Cap Oral DAILY 1 Cap at 11/05/20 0711  loperamide (IMODIUM) capsule 4 mg  4 mg Oral BID 4 mg at 11/05/20 0946  
 magnesium oxide (MAG-OX) tablet 400 mg  400 mg Oral  mg at 11/04/20 2220  mirtazapine (REMERON) tablet 15 mg  15 mg Oral QHS 15 mg at 11/04/20 2220  
 arformoterol 15 mcg/budesonide 0.5 mg neb solution   Nebulization BID RT    
 montelukast (SINGULAIR) tablet 10 mg  10 mg Oral QPM 10 mg at 11/04/20 1705  pantoprazole (PROTONIX) tablet 40 mg  40 mg Oral ACB&D 40 mg at 11/05/20 0544  potassium chloride SR (KLOR-CON 10) tablet 10 mEq  10 mEq Oral BID 10 mEq at 11/05/20 0946  predniSONE (DELTASONE) tablet 10 mg  10 mg Oral BID WITH MEALS 10 mg at 11/05/20 0946  albuterol-ipratropium (DUO-NEB) 2.5 MG-0.5 MG/3 ML  3 mL Nebulization Q6H PRN    
 albuterol (ACCUNEB) nebulizer solution 1.25 mg  1.25 mg Nebulization Q2H PRN Case discussed with: 
 
 
Dominique Perales DO

## 2020-11-05 NOTE — PROGRESS NOTES
Bedside and Verbal shift change report given to [address] MADAI Zeng (oncoming nurse) by Madelin Barroso RN (offgoing nurse). Report included the following information SBAR, Kardex, Intake/Output and Recent Results.

## 2020-11-05 NOTE — PROGRESS NOTES
Problem: Mobility Impaired (Adult and Pediatric) Goal: *Acute Goals and Plan of Care (Insert Text) Description: FUNCTIONAL STATUS PRIOR TO ADMISSION: Patient required minimal assistance for basic and instrumental ADLs. HOME SUPPORT PRIOR TO ADMISSION: The patient was in rehab. Physical Therapy Goals Initiated 11/3/2020 1. Patient will move from supine to sit and sit to supine , scoot up and down, and roll side to side in bed with modified independence within 7 day(s). 2.  Patient will transfer from bed to chair and chair to bed with modified independence using the least restrictive device within 7 day(s). 3.  Patient will perform sit to stand with modified independence within 7 day(s). 4.  Patient will ambulate with modified independence for 50 feet with the least restrictive device within 7 day(s). Outcome: Progressing Towards Goal 
 
PHYSICAL THERAPY TREATMENT Patient: Donovan Rednon (6 y.o. female) Date: 11/5/2020 Diagnosis: Sepsis ([facility] [A41.9] Sepsis ([facility] Precautions:   
Chart, physical therapy assessment, plan of care and goals were reviewed. ASSESSMENT Patient continues with skilled PT services and is progressing towards goals. Patient received in bedside chair and c/o strong fatigue. Challenged with sit to stand and required moderate assist with rocking to stand. Gait x25' in room using RW and requiring minimal assistance. Noted heavy EDWARDS and patient required moderate cues to maintain PLB with activity but SpO2 92-94% on 3L O2 via NC throughout. Patient remains below her prior level of function and presents with elevated fall risk. Recommend return to rehab when medically stable. Current Level of Function Impacting Discharge (mobility/balance): moderate assist sit to stand after fatigue PLAN : 
Patient continues to benefit from skilled intervention to address the above impairments. Continue treatment per established plan of care. to address goals. Recommendation for discharge: (in order for the patient to meet his/her long term goals) Therapy up to 5 days/week in SNF setting IF patient discharges home will need the following DME: to be determined (TBD) SUBJECTIVE:  
Patient stated I'm going to need help stnding.  OBJECTIVE DATA SUMMARY:  
Critical Behavior: 
Neurologic State: Alert Orientation Level: Oriented X4 Cognition: Appropriate decision making Functional Mobility Training: 
Bed Mobility: 
  
  
Sit to Supine: Minimum assistance Transfers: 
Sit to Stand: Moderate assistance Stand to Sit: Contact guard assistance Bed to Chair: Contact guard assistance Balance: 
Sitting: Intact Standing: Intact; With support Standing - Static: Fair Standing - Dynamic : Fair Ambulation/Gait Training: 
Distance (ft): 25 Feet (ft) Assistive Device: Walker, rolling;Gait belt Ambulation - Level of Assistance: Contact guard assistance Gait Abnormalities: Path deviations Base of Support: Widened Speed/Es: Slow;Shuffled Stairs: Therapeutic Exercises:  
 
Pain Rating: 
 
 
Activity Tolerance:  
Fair After treatment patient left in no apparent distress:  
Supine in bed and Call bell within reach COMMUNICATION/COLLABORATION:  
The patients plan of care was discussed with: Registered nurse. Katherine Karimi, PT, DPT Time Calculation: 18 mins

## 2020-11-05 NOTE — PROGRESS NOTES
Bedside and Verbal shift change report given to Corazon Lopez, [facility] (oncoming nurse) by [employer] [facility], RN (offgoing nurse). Report included the following information SBAR, Kardex and MAR.

## 2020-11-06 NOTE — PROGRESS NOTES
[facility] Progress Note Desiree Phillips DO 
  [phone] Office [phone]  Fax 2020 Assessment & Plan: · Left lower extremity cellulitis. Orthopedic surgery following. No evidence of septic joint or prepatellar bursitis. No fluid collection or osteomyelitis on CT scan. Procalcitonin and CRP within normal limits. ESR mildly elevated but corrects to normal taking age into account. Cultures growing mixed skin jazmine. No obvious signs of infection at the time of examination. Was just on a prolonged course of doxycycline and Augmentin in the outpatient setting. Antibiotics narrowed to ceftriaxone. .  From infectious diseases standpoint okay for discharge on cefdinir 300 mg p.o. twice daily for 5 days · CoNS bacteremia. Isolated in 1 out of 4 bottles. Contaminant. No further work-up or treatment planned · Chronic immunosuppression. On prednisone 10 mg twice daily for temporal arteritis · Leukocytosis. This appears to be at least in part chronic. Follow trend · Diabetes mellitus · Ciprofloxacin, sulfa, and tetracycline allergies Subjective: No complaints. Leg feeling better Objective:  
 
Vitals:  
Visit Vitals BP (!) 150/66 (BP 1 Location: Left arm, BP Patient Position: At rest) Comment: RN notified Pulse 90 Temp 98.2 °F (36.8 °C) Resp 16 Wt 255 lb (115.7 kg) SpO2 97% BMI 39.94 kg/m² Tmax:  Temp (24hrs), Av.1 °F (36.7 °C), Min:97.7 °F (36.5 °C), Max:98.4 °F (36.9 °C) Exam:  
Patient is intubated:  no 
 
Physical Examination:  
General:  Alert, cooperative, no distress Head:  Normocephalic, atraumatic. Eyes:  Conjunctivae clear Neck: Supple Lungs:   No distress. Chest wall:    
Heart:    
Abdomen:    Obese, nondistended Extremities: Moves all. Chronic lower extremity lymphedema Skin:  No significant erythema at previous cellulitic area on the left knee Neurologic: CNII-XII intact. Normal strength Labs:     
 
No lab exists for component: ITNL No results for input(s): CPK, CKMB, TROIQ in the last 72 hours. Recent Labs 11/06/20 
6488 11/05/20 
0510  141  
K 4.4 4.8  
* 111* CO2 27 26 BUN 19 18 CREA 1.08* 1.02  
* 134* ALB 2.3* 2.2* WBC 11.1* 10.6 HGB 9.9* 9.4* HCT 35.2 32.6*  
 237 No results for input(s): INR, PTP, APTT, INREXT, INREXT in the last 72 hours. Needs: urine analysis, urine sodium, protein and creatinine No results found for: Dashawn Shepherd Cultures: No results found for: CANDIS Lab Results Component Value Date/Time Culture result: NO GROWTH AFTER 16 HOURS 11/05/2020 02:32 PM  
 Culture result: LIGHT MIXED SKIN NUBIA ISOLATED 11/02/2020 07:25 PM  
 Culture result: (A) 11/02/2020 04:26 PM  
  STAPHYLOCOCCUS SPECIES, COAGULASE NEGATIVE GROWING IN 1 OF 4 BOTTLES DRAWN SITE=(NOT INDICATED) Culture result: (A) 11/02/2020 04:26 PM  
  PRELIMINARY REPORT OF GRAM POSITIVE COCCI IN CLUSTERS GROWING IN 1 OF 4 BOTTLES DRAWN CALLED TO AND READ BACK BY CRISPIN Edmonds 50 RN [facility] AT 1912 ON 11/4/20. Jimenez 9450 Culture result: REMAINING BOTTLE(S) HAS/HAVE NO GROWTH SO FAR 11/02/2020 04:26 PM  
 
 
Radiology:  
 
Medications Current Facility-Administered Medications Medication Dose Route Frequency Last Dose  cefTRIAXone (ROCEPHIN) 2 g in sterile water (preservative free) 20 mL IV syringe  2 g IntraVENous Q24H 2 g at 11/05/20 1959  sodium chloride (NS) flush 5-40 mL  5-40 mL IntraVENous Q8H 10 mL at 11/06/20 8614  sodium chloride (NS) flush 5-40 mL  5-40 mL IntraVENous PRN    
 acetaminophen (TYLENOL) tablet 650 mg  650 mg Oral Q4H PRN    
 HYDROcodone-acetaminophen (NORCO) 5-325 mg per tablet 1 Tab  1 Tab Oral Q4H PRN    
 naloxone (NARCAN) injection 0.4 mg  0.4 mg IntraVENous PRN    
 ondansetron (ZOFRAN) injection 4 mg  4 mg IntraVENous Q4H PRN 4 mg at 11/05/20 6889  glucose chewable tablet 16 g  4 Tab Oral PRN    
 dextrose (D50W) injection syrg 12.5-25 g  25-50 mL IntraVENous PRN    
 glucagon (GLUCAGEN) injection 1 mg  1 mg IntraMUSCular PRN    
 insulin lispro (HUMALOG) injection   SubCUTAneous AC&HS Stopped at 11/06/20 1130  
 apixaban (ELIQUIS) tablet 5 mg  5 mg Oral BID 5 mg at 11/06/20 2073  cholecalciferol (VITAMIN D3) (1000 Units /25 mcg) tablet 2 Tab  2,000 Units Oral DAILY 2 Tab at 11/06/20 2944  cyanocobalamin (VITAMIN B12) tablet 1,000 mcg  1,000 mcg Oral DAILY 1,000 mcg at 11/06/20 6888  digoxin (LANOXIN) tablet 0.125 mg  0.125 mg Oral DAILY 0.125 mg at 11/06/20 3150  DULoxetine (CYMBALTA) capsule 60 mg  60 mg Oral DAILY 60 mg at 11/06/20 1597  ferrous sulfate tablet 325 mg  325 mg Oral DAILY WITH BREAKFAST 325 mg at 11/06/20 0820  
 lactobac ac& pc-s.therm-b.anim (NUBIA Q/RISAQUAD)  1 Cap Oral DAILY 1 Cap at 11/06/20 4225  
 loperamide (IMODIUM) capsule 4 mg  4 mg Oral BID 4 mg at 11/06/20 0820  
 magnesium oxide (MAG-OX) tablet 400 mg  400 mg Oral  mg at 11/05/20 2231  mirtazapine (REMERON) tablet 15 mg  15 mg Oral QHS 15 mg at 11/05/20 2231  
 arformoterol 15 mcg/budesonide 0.5 mg neb solution   Nebulization BID RT    
 montelukast (SINGULAIR) tablet 10 mg  10 mg Oral QPM 10 mg at 11/05/20 1752  pantoprazole (PROTONIX) tablet 40 mg  40 mg Oral ACB&D 40 mg at 11/06/20 7927  potassium chloride SR (KLOR-CON 10) tablet 10 mEq  10 mEq Oral BID 10 mEq at 11/06/20 0820  predniSONE (DELTASONE) tablet 10 mg  10 mg Oral BID WITH MEALS 10 mg at 11/06/20 0820  
 albuterol-ipratropium (DUO-NEB) 2.5 MG-0.5 MG/3 ML  3 mL Nebulization Q6H PRN    
 albuterol (ACCUNEB) nebulizer solution 1.25 mg  1.25 mg Nebulization Q2H PRN Case discussed with: 
 
 
Melanie Shen DO

## 2020-11-06 NOTE — PROGRESS NOTES
1240:  Discharge instructions reviewed with the patient and she verbalized understanding. AVS e-signed. 1420:  TRANSFER - OUT REPORT: 
 
Verbal report given to Bonifacio(name) on [facility]  being transferred to [facility] (unit) for routine progression of care Report consisted of patients Situation, Background, Assessment and  
Recommendations(SBAR). Information from the following report(s) SBAR, Kardex, MAR, Accordion and Recent Results was reviewed with the receiving nurse. Lines:    
 
Opportunity for questions and clarification was provided. Patient transported with: 
[facility] transport

## 2020-11-06 NOTE — DISCHARGE INSTRUCTIONS
Patient Discharge Instructions    Benoit Reddy / [id number] : 1945    Admitted 2020 Discharged: 2020 12:09 PM     ACUTE DIAGNOSES:  Sepsis ([facility] [A41.9]    CHRONIC MEDICAL DIAGNOSES:  Problem List as of 2020 Date Reviewed: 2020          Codes Class Noted - Resolved    Leukocytosis ICD-10-CM: D72.829  ICD-9-CM: 288.60  11/3/2020 - Present        Chronic respiratory failure with hypoxia [facility]) ICD-10-CM: J96.11  ICD-9-CM: 518.83, 799.02  2020 - Present    Overview Signed 2020 10:12 PM by Camelia Diaz MD     On 3L NC at home              Cellulitis of lower extremity ICD-10-CM: L03.119  ICD-9-CM: 682.6  3/23/2020 - Present        ASO (arteriosclerosis obliterans) ICD-10-CM: I70.90  ICD-9-CM: 440.9  2019 - Present        PVD (peripheral vascular disease) ([facility] ICD-10-CM: I73.9  ICD-9-CM: 443.9  2019 - Present        Severe obesity ([facility] ICD-10-CM: E66.01  ICD-9-CM: 278.01  2019 - Present        COPD (chronic obstructive pulmonary disease) ([facility] ICD-10-CM: J44.9  ICD-9-CM: 496  2019 - Present        Normocytic anemia ICD-10-CM: D64.9  ICD-9-CM: 285.9  2019 - Present        PAD (peripheral artery disease) ([facility] ICD-10-CM: I73.9  ICD-9-CM: 443.9  2019 - Present        Mild intermittent asthma ICD-10-CM: J45.20  ICD-9-CM: 493.90  2019 - Present        Brachial artery thrombus (HCC) ICD-10-CM: I74.2  ICD-9-CM: 444.21  2019 - Present        Sleep apnea ICD-10-CM: G47.30  ICD-9-CM: 780.57  2019 - Present    Overview Signed 2019  8:41 PM by DO jose raul Morales CPAP             Atrial fibrillation ([facility] ICD-10-CM: I48.91  ICD-9-CM: 427.31  2018 - Present        Obesity (BMI 30-39.9) (Chronic) ICD-10-CM: G95.1  ICD-9-CM: 278.00  2018 - Present        Recurrent deep vein thrombosis (DVT) ([facility] ICD-10-CM: I82.409  ICD-9-CM: 453.40  3/30/2018 - Present        Chronic anticoagulation (Chronic) ICD-10-CM: Z79.01  ICD-9-CM: V58.61  3/30/2018 - Present        Essential hypertension (Chronic) ICD-10-CM: I10  ICD-9-CM: 401.9  1/22/2016 - Present        CAD (coronary artery disease) ICD-10-CM: I25.10  ICD-9-CM: 414.00  10/9/2015 - Present        Type II diabetes mellitus (HCC) (Chronic) ICD-10-CM: E11.9  ICD-9-CM: 250.00  10/9/2015 - Present        RESOLVED: Syncope and collapse ICD-10-CM: R55  ICD-9-CM: 780.2  9/29/2020 - 11/2/2020        RESOLVED: Cellulitis ICD-10-CM: L03.90  ICD-9-CM: 682.9  3/23/2020 - 5/29/2020        RESOLVED: Hypokalemia ICD-10-CM: E87.6  ICD-9-CM: 276.8  3/23/2020 - 5/29/2020        RESOLVED: Hyponatremia ICD-10-CM: E87.1  ICD-9-CM: 276.1  3/23/2020 - 5/29/2020        RESOLVED: Diarrhea ICD-10-CM: R19.7  ICD-9-CM: 787.91  3/23/2020 - 5/29/2020        RESOLVED: Syncope and collapse ICD-10-CM: R55  ICD-9-CM: 780.2  7/13/2019 - 5/29/2020        RESOLVED: UTI (urinary tract infection) ICD-10-CM: N39.0  ICD-9-CM: 599.0  7/13/2019 - 11/2/2020        * (Principal) RESOLVED: Sepsis ([address] ICD-10-CM: A41.9  ICD-9-CM: 038.9, 995.91  7/13/2019 - 11/3/2020        RESOLVED: Leg wound, left ICD-10-CM: A55.356R  ICD-9-CM: 891.0  7/13/2019 - 5/29/2020        RESOLVED: Leg laceration, right, initial encounter ICD-10-CM: A30.203H  ICD-9-CM: 894.0  7/13/2019 - 5/29/2020        RESOLVED: Cellulitis of right upper arm ICD-10-CM: L03.113  ICD-9-CM: 682.3  5/17/2019 - 5/29/2020        RESOLVED: Gangrene of finger of right hand ([facility] ICD-10-CM: I96  ICD-9-CM: 785.4  5/17/2019 - 11/2/2020        RESOLVED: Bowel obstruction ([facility] ICD-10-CM: U80.541  ICD-9-CM: 560.9  1/8/2019 - 5/29/2020        RESOLVED: Partial small bowel obstruction ([facility] ICD-10-CM: K56.600  ICD-9-CM: 560.9  1/5/2019 - 5/29/2020        RESOLVED: Dyspnea ICD-10-CM: R06.00  ICD-9-CM: 786.09  11/13/2018 - 5/29/2020        RESOLVED: ARF (acute renal failure) ([facility] ICD-10-CM: N17.9  ICD-9-CM: 584.9  11/13/2018 - 5/29/2020        RESOLVED: Closed wedge compression fracture of T7 vertebra (HCC) ICD-10-CM: Q25.893H  ICD-9-CM: 805.2  11/13/2018 - 5/29/2020        RESOLVED: Type 2 diabetes with nephropathy ([facility] ICD-10-CM: E11.21  ICD-9-CM: 250.40, 583.81  10/1/2018 - 11/2/2020        RESOLVED: Chest pain ICD-10-CM: R07.9  ICD-9-CM: 786.50  6/27/2018 - 5/29/2020        RESOLVED: Abdominal pain ICD-10-CM: R10.9  ICD-9-CM: 789.00  6/27/2018 - 5/29/2020        RESOLVED: Coronary artery disease involving native coronary artery without angina pectoris (Chronic) ICD-10-CM: I25.10  ICD-9-CM: 414.01  1/22/2016 - 11/2/2020        RESOLVED: HTN (hypertension) ICD-10-CM: I10  ICD-9-CM: 401.9  10/9/2015 - 1/22/2016        RESOLVED: NSTEMI (non-ST elevated myocardial infarction) [facility]) ICD-10-CM: I21.4  ICD-9-CM: 410.70  10/9/2015 - 5/29/2020              DISCHARGE MEDICATIONS:         · It is important that you take the medication exactly as they are prescribed. · Keep your medication in the bottles provided by the pharmacist and keep a list of the medication names, dosages, and times to be taken in your wallet. · Do not take other medications without consulting your doctor. DIET:  Diabetic Diet    ACTIVITY: Activity as tolerated    ADDITIONAL INFORMATION: If you experience any of the following symptoms then please call your primary care physician or return to the emergency room if you cannot get hold of your doctor: Fever, chills, nausea, vomiting, diarrhea, change in mentation, falling, bleeding, shortness of breath. FOLLOW UP CARE:  Dr. Severiano Hollins MD  you are to call and set up an appointment to see them with in 1 week. Follow-up with specialists at directed by them      Information obtained by :  I understand that if any problems occur once I am at home I am to contact my physician. I understand and acknowledge receipt of the instructions indicated above. Physician's or R.N.'s Signature                                                                  Date/Time                                                                                                                                              Patient or Representative Signature                                                          Date/Time

## 2020-11-06 NOTE — DISCHARGE SUMMARY
Physician Discharge Summary Patient ID:   
Azucena Deluca [id number] 
2 y.o. 
1945 Divine Persaud MD 
 
Admit date: 11/2/2020 Discharge date and time: 11/6/2020 Admission Diagnoses: Sepsis ([facility].) [A41.9] Chronic Diagnoses:   
Problem List as of 11/6/2020 Date Reviewed: 11/2/2020 Codes Class Noted - Resolved Leukocytosis ICD-10-CM: S80.773 ICD-9-CM: 288.60  11/3/2020 - Present Chronic respiratory failure with hypoxia [facility]) ICD-10-CM: J96.11 
ICD-9-CM: 518.83, 799.02  11/2/2020 - Present Overview Signed 11/2/2020 10:12 PM by Lino Hernandez MD  
  On 3L NC at home Cellulitis of lower extremity ICD-10-CM: L03.119 ICD-9-CM: 682.6  3/23/2020 - Present ASO (arteriosclerosis obliterans) ICD-10-CM: I70.90 ICD-9-CM: 440.9  9/5/2019 - Present PVD (peripheral vascular disease) ([facility] ICD-10-CM: I73.9 ICD-9-CM: 443.9  8/1/2019 - Present Severe obesity ([facility] ICD-10-CM: E66.01 
ICD-9-CM: 278.01  7/30/2019 - Present COPD (chronic obstructive pulmonary disease) ([facility]) ICD-10-CM: J44.9 ICD-9-CM: 496  7/13/2019 - Present Normocytic anemia ICD-10-CM: D64.9 ICD-9-CM: 285.9  7/13/2019 - Present PAD (peripheral artery disease) ([facility]) ICD-10-CM: I73.9 ICD-9-CM: 443.9  7/13/2019 - Present Mild intermittent asthma ICD-10-CM: J45.20 ICD-9-CM: 493.90  5/17/2019 - Present Brachial artery thrombus (HCC) ICD-10-CM: E98.0 ICD-9-CM: 444.21  4/26/2019 - Present Sleep apnea ICD-10-CM: G47.30 ICD-9-CM: 780.57  1/5/2019 - Present Overview Signed 1/5/2019  8:41 PM by William Flores DO  
  wears CPAP Atrial fibrillation [facility]) ICD-10-CM: I48.91 
ICD-9-CM: 427.31  11/16/2018 - Present Obesity (BMI 30-39.9) (Chronic) ICD-10-CM: K45.3 ICD-9-CM: 278.00  11/13/2018 - Present Recurrent deep vein thrombosis (DVT) (HCC) ICD-10-CM: I82.409 ICD-9-CM: 453.40  3/30/2018 - Present Chronic anticoagulation (Chronic) ICD-10-CM: Z79.01 
ICD-9-CM: V58.61  3/30/2018 - Present Essential hypertension (Chronic) ICD-10-CM: I10 
ICD-9-CM: 401.9  1/22/2016 - Present CAD (coronary artery disease) ICD-10-CM: I25.10 ICD-9-CM: 414.00  10/9/2015 - Present Type II diabetes mellitus (HCC) (Chronic) ICD-10-CM: E11.9 ICD-9-CM: 250.00  10/9/2015 - Present RESOLVED: Syncope and collapse ICD-10-CM: R55 
ICD-9-CM: 780.2  9/29/2020 - 11/2/2020 RESOLVED: Cellulitis ICD-10-CM: L03.90 ICD-9-CM: 682.9  3/23/2020 - 5/29/2020 RESOLVED: Hypokalemia ICD-10-CM: E87.6 ICD-9-CM: 276.8  3/23/2020 - 5/29/2020 RESOLVED: Hyponatremia ICD-10-CM: E87.1 ICD-9-CM: 276.1  3/23/2020 - 5/29/2020 RESOLVED: Diarrhea ICD-10-CM: R19.7 ICD-9-CM: 787.91  3/23/2020 - 5/29/2020 RESOLVED: Syncope and collapse ICD-10-CM: R55 
ICD-9-CM: 780.2  7/13/2019 - 5/29/2020 RESOLVED: UTI (urinary tract infection) ICD-10-CM: N39.0 ICD-9-CM: 599.0  7/13/2019 - 11/2/2020 * (Principal) RESOLVED: Sepsis ([facility] ICD-10-CM: A41.9 ICD-9-CM: 038.9, 995.91  7/13/2019 - 11/3/2020 RESOLVED: Leg wound, left ICD-10-CM: T56.287K ICD-9-CM: 891.0  7/13/2019 - 5/29/2020 RESOLVED: Leg laceration, right, initial encounter ICD-10-CM: Y02.996M ICD-9-CM: 894.0  7/13/2019 - 5/29/2020 RESOLVED: Cellulitis of right upper arm ICD-10-CM: L03.113 ICD-9-CM: 682.3  5/17/2019 - 5/29/2020 RESOLVED: Gangrene of finger of right hand ([facility] ICD-10-CM: V24 
ICD-9-CM: 785.4  5/17/2019 - 11/2/2020 RESOLVED: Bowel obstruction ([facility] ICD-10-CM: E52.075 ICD-9-CM: 560.9  1/8/2019 - 5/29/2020 RESOLVED: Partial small bowel obstruction ([facility] ICD-10-CM: K56.600 ICD-9-CM: 560.9  1/5/2019 - 5/29/2020 RESOLVED: Dyspnea ICD-10-CM: R06.00 
ICD-9-CM: 786.09  11/13/2018 - 5/29/2020 RESOLVED: ARF (acute renal failure) (HCC) ICD-10-CM: N17.9 ICD-9-CM: 584.9  11/13/2018 - 5/29/2020 RESOLVED: Closed wedge compression fracture of T7 vertebra ([facility] ICD-10-CM: J34.839H ICD-9-CM: 805.2  11/13/2018 - 5/29/2020 RESOLVED: Type 2 diabetes with nephropathy ([facility] ICD-10-CM: E11.21 
ICD-9-CM: 250.40, 583.81  10/1/2018 - 11/2/2020 RESOLVED: Chest pain ICD-10-CM: R07.9 ICD-9-CM: 786.50  6/27/2018 - 5/29/2020 RESOLVED: Abdominal pain ICD-10-CM: R10.9 ICD-9-CM: 789.00  6/27/2018 - 5/29/2020 RESOLVED: Coronary artery disease involving native coronary artery without angina pectoris (Chronic) ICD-10-CM: I25.10 ICD-9-CM: 414.01  1/22/2016 - 11/2/2020 RESOLVED: HTN (hypertension) ICD-10-CM: I10 
ICD-9-CM: 401.9  10/9/2015 - 1/22/2016 RESOLVED: NSTEMI (non-ST elevated myocardial infarction) ([facility] ICD-10-CM: I21.4 ICD-9-CM: 410.70  10/9/2015 - 5/29/2020 Discharge Medications:  
Current Discharge Medication List  
  
START taking these medications Details  
cefdinir (OMNICEF) 300 mg capsule Take 1 Cap by mouth two (2) times a day for 5 days. Qty: 10 Cap, Refills: 0 CONTINUE these medications which have NOT CHANGED Details  
apixaban (ELIQUIS) 5 mg tablet Take 5 mg by mouth two (2) times a day. alendronate (FOSAMAX) 70 mg tablet Take 70 mg by mouth every thirty (30) days. On the 1st day of each month  
  
zinc oxide 20 % ointment Apply  to affected area three (3) times daily. Bilateral gluteal and perianal area each shift  
  
loperamide (IMODIUM) 2 mg capsule Take 4 mg by mouth two (2) times a day. honey (MediHoney, honey,) 100 % topical paste Apply  to affected area daily. cyanocobalamin 1,000 mcg tablet Take 1 Tab by mouth daily. Qty: 30 Tab, Refills: 0  
  
digoxin (LANOXIN) 0.125 mg tablet Take 1 Tab by mouth daily. Qty: 30 Tab, Refills: 0  
  
ferrous sulfate 325 mg (65 mg iron) tablet Take 1 Tab by mouth daily (with breakfast). Qty: 30 Tab, Refills: 0 tiotropium bromide (Spiriva Respimat) 2.5 mcg/actuation inhaler Take 2 Puffs by inhalation daily. montelukast (Singulair) 10 mg tablet Take 10 mg by mouth every evening. sAXagliptin (ONGLYZA) 5 mg tab tablet Take 5 mg by mouth Daily (before dinner). metoprolol tartrate (LOPRESSOR) 25 mg tablet Take 25 mg by mouth daily as needed (For systolic >614). acetaminophen (TYLENOL) 325 mg tablet Take 1 Tab by mouth every four (4) hours as needed for Pain. Qty: 30 Tab, Refills: 0  
  
magnesium oxide (MAG-OX) 400 mg tablet Take 400 mg by mouth nightly. potassium chloride SR (KLOR-CON 10) 10 mEq tablet Take 10 mEq by mouth two (2) times a day. predniSONE (DELTASONE) 10 mg tablet Take 10 mg by mouth two (2) times daily (with meals). Omeprazole delayed release (PRILOSEC D/R) 20 mg tablet Take 20 mg by mouth two (2) times a day. mirtazapine (REMERON) 15 mg tablet Take 15 mg by mouth nightly. albuterol (PROVENTIL VENTOLIN) 2.5 mg /3 mL (0.083 %) nebulizer solution 2.5 mg by Nebulization route every six (6) hours as needed for Wheezing or Shortness of Breath. albuterol (PROAIR HFA) 90 mcg/actuation inhaler Take 2 Puffs by inhalation every four (4) hours as needed. lactobacillus rhamnosus gg 10 billion cell (CULTURELLE) 10 billion cell capsule Take 1 Cap by mouth daily. biotin 10,000 mcg cap Take 1 Cap by mouth daily. DULoxetine (CYMBALTA) 60 mg capsule Take 60 mg by mouth daily. cholecalciferol, vitamin D3, (Vitamin D3) 50 mcg (2,000 unit) tab Take 2,000 Units by mouth daily. azelastine-fluticasone (DYMISTA) 137-50 mcg/spray spry 2 Sprays by Nasal route daily as needed (allergies). mometasone-formoterol (DULERA) 200-5 mcg/actuation HFA inhaler Take 2 Puffs by inhalation two (2) times daily as needed. STOP taking these medications  
  
 doxycycline (VIBRA-TABS) 100 mg tablet Comments:  
Reason for Stopping: ferrous sulfate (Slow Fe) 142 mg (45 mg iron) ER tablet Comments:  
Reason for Stopping:   
   
 promethazine (PHENERGAN) 25 mg tablet Comments:  
Reason for Stopping:   
   
 cephALEXin (KEFLEX) 500 mg capsule Comments:  
Reason for Stopping:   
   
 ibandronate (BONIVA) 150 mg tablet Comments:  
Reason for Stopping: Follow up Care: 1. Elijah Coy MD with in 1 weeks 2. ID specialists as directed. Diet:  Diabetic Diet Disposition: 
[facility]. Advanced Directive: 
 
Discharge Exam: [See today's progress note.] CONSULTATIONS: ID Significant Diagnostic Studies:  
Recent Labs 11/06/20 
4431 11/05/20 
0510 WBC 11.1* 10.6 HGB 9.9* 9.4* HCT 35.2 32.6*  
 237 Recent Labs 11/06/20 
3670 11/05/20 
0510  141  
K 4.4 4.8  
* 111* CO2 27 26 BUN 19 18 CREA 1.08* 1.02  
* 134* CA 8.1* 8.0* Recent Labs 11/06/20 
2200 11/05/20 
0510 ALT 67 50 * 236* TBILI 0.4 0.4 TP 5.9* 5.4* ALB 2.3* 2.2*  
GLOB 3.6 3.2 No results for input(s): INR, PTP, APTT, INREXT in the last 72 hours. No results for input(s): FE, TIBC, PSAT, FERR in the last 72 hours. No results for input(s): PH, PCO2, PO2 in the last 72 hours. No results for input(s): CPK, CKMB in the last 72 hours. No lab exists for component: TROPONINI Lab Results Component Value Date/Time Glucose (POC) 109 (H) 11/06/2020 11:01 AM  
 Glucose (POC) 140 (H) 11/06/2020 06:18 AM  
 Glucose (POC) 189 (H) 11/05/2020 09:15 PM  
 Glucose (POC) 168 (H) 11/05/2020 05:26 PM  
 Glucose (POC) 100 11/05/2020 11:44 AM  
 
Lab Results Component Value Date/Time TSH 0.58 11/13/2018 03:26 PM  
 
 
 
 
 
HOSPITAL COURSE & TREATMENT RENDERED:  
Cellulitis - reported in the LLE. Does have a very small wound over the left patellar region but not exquisitely warm or tender to touch. No expressible pus. CT above without abscess - f/u cultures- 1/4 bottles with gram + cocci- likely contaminant- Repeat BC neg so far 
- continue IV antibiotics - change to po 
- will ask ortho to eval- they have signed off - ID consulted- antibiotic changed to Rocephin 
  
CAD (coronary artery disease) (10/9/2015) - not on ASA, statin 
- continue metoprolol  
  
Type II diabetes mellitus ([address] (10/9/2015) - ISS  
- BG checks AC TID and qHS  
- resume home meds at [address] [place] [address] hypertension (1/22/2016) - continue metoprolol 
  
Recurrent deep vein thrombosis (DVT) ([address] (3/30/2018) - on Eliquis  
  
Chronic anticoagulation (3/30/2018) - Eliquis for a-fib and h/o DVT 
- BLE dopplers + DVT  
  
Sleep apnea (1/5/2019) - CPAP qHS  
  
Atrial fibrillation ([address] (11/16/2018) - on digoxin and metoprolol  
- continue eliquis  
  
COPD (chronic obstructive pulmonary disease) ([address] (7/13/2019) - chronically on O2  
- continue nebs  
  
PAD (peripheral artery disease) ([address] (7/13/2019)/PVD 
  
Severe obesity ([address] (7/30/2019) - counseled on weight loss  
  
Chronic respiratory failure with hypoxia ([address] (11/2/2020) 
- on 3L  
  
Disp 
- Back to SNF- repeat COVID today and CXR 
   
  
 
HPI:  
Ms. Jalloh is a 69 y.o.  female with PMH of a-fib, CAD, DM, asthma, h/p DVT admitted for LLE \"cellulitis\". Per pt was sent here by her wound care doctor for further evaluation. Denies fevers/chills. No N/V. Has noted some drainage from her L knee wound. Does to have hardware in her L leg  (Dr Michelle Blount) 
  
11/3: Has been at [facility] in the [facility] for the last 2 weeks. Sent to the ER as she was having more drainage from her knee. No other complaints.   
  
11/4: Ortho consult completed and do not feel joint is septic. Will ask ID to see. Cultures neg so far but was on antibiotics prior to admission. She has no complaints at this time.  Dopplers not done yet so will reorder.  
  
 : BC / bottles with gram + cocci- likely contaminant. Afebrile. ID has seen and antibiotic changed to Rocephin. COVID neg and will repeat today as she needs  2 neg results. Dopplers + DVT. On Eliquis.  
  
: No complaints this am. 2nd COVID pending. CXR is ok. Repeat BC pending. She is medically stable for rehab back to [address] [place] [address] if tests are neg. Hope to be able to send later today.  
  
12p: Second COVID neg. BC neg so far. [address] [place] [address] has accepted her for later today. Review of Systems: A comprehensive review of systems was negative except for that written in the HPI. 
  
Objective:  
Physical Exam:  
  
Visit Vitals BP (!) 141/66 (BP 1 Location: Left arm, BP Patient Position: At rest) Pulse 99 Temp 97.7 °F (36.5 °C) Resp 17 Wt 255 lb (115.7 kg) SpO2 99% BMI 39.94 kg/m² O2 Flow Rate (L/min): 3 l/min O2 Device: Nasal cannula 
  
Temp (24hrs), Av.1 °F (36.7 °C), Min:97.7 °F (36.5 °C), Max:98.4 °F (36.9 °C)  190 -  0700 In: 36 [I.V.:40] Out: 0     07 -  190 In: 36 [P.O.:1100; I.V.:30] Out: 0  
  
General:  Alert, cooperative, no distress Obese Head:  Normocephalic, without obvious abnormality, atraumatic. Eyes:  Conjunctivae/corneas clear. PERRL, EOMs intact. Nose: Nares normal. Septum midline. Mucosa normal. No drainage or sinus tenderness. Throat: Lips, mucosa, and tongue normal.   
Neck: Supple, symmetrical, trachea midline, no adenopathy, thyroid: no enlargement/tenderness/nodules, no carotid bruit and no JVD. Back:   Symmetric, no curvature. ROM normal. No CVA tenderness. Lungs:   Clear to auscultation bilaterally. Chest wall:  No tenderness or deformity. Heart:  Regular rate and rhythm, S1, S2 normal, no murmur, click, rub or gallop. Abdomen:   Soft, non-tender. Bowel sounds normal. No masses,  No organomegaly. Extremities: Extremities swollen and weeping, erythematous bilaterally, left knee with small amount of drainage present on bandage, both LE scaly and dry,  atraumatic, no cyanosis  No calf tenderness or cords. Pulses: 1+ and symmetric all extremities. Skin: Skin turgor normal.  
Neurologic: CNII-XII intact. Alert and oriented X 3. Fine motor of hands and fingers normal.   equal.  No cogwheeling or rigidity. Gait not tested at this time. Sensation grossly normal to touch. Gross motor of extremities normal.    
  
Data Review: CT LE 11/2/20 FINDINGS: The bones are mildly osteopenic. There is no fracture or dislocation 
or osseous attenuation abnormality to suggest osteomyelitis. 
  
There is no joint prosthesis demonstrated. There is no substantial knee or ankle 
arthrosis. No knee1 or ankle effusion is shown. 
  
The muscles are normal in size and attenuation. No compartmental mass or 
collection is shown. There are scattered atherosclerotic calcifications which 
are greater on the left especially at the proximal left popliteal artery. 
  
There is diffuse bilateral subcutaneous strand-like opacification with areas of 
subdermal confluence anterolaterally on the left at the level of the knee. Diffuse skin thickening is shown. 
  
IMPRESSION:  
Diffuse bilateral subcutaneous strand-like opacification which could be on 
inflammatory basis or related to chronic venous stasis and lymphedema. There are 
areas of subdermal confluence of nonspecific nature anteromedially at the level 
of the knee. There is no localized intramuscular collection, joint effusion, or 
CT imaging evidence for osteomyelitis.  
  
Lower Extremity Venous Findings Dopplers BLE 11/4/20 
  
Right Lower Venous  
  Technically difficult exam due to: patient body habitus and marked edema. Age indeterminate non-occlusive thrombus present in the right common femoral vein. The profunda femoral, proximal femoral, mid femoral, distal femoral, popliteal and saphenous femoral junction vein(s) were imaged in the transverse and longitudinal planes. The vessels showed normal color filling and compressibility. Doppler interrogation of the veins showed phasic and spontaneous flow. The posterior tibial and peroneal vein(s) were not well visualized. Left Lower Venous  
  Technically difficult exam due to: patient body habitus and marked edema. The common femoral, saphenous femoral junction, proximal femoral, mid femoral and distal femoral vein(s) were imaged in the transverse and longitudinal planes. The vessels showed normal color filling and compressibility. Doppler interrogation of the veins showed phasic and spontaneous flow. The popliteal, posterior tibial and peroneal vein(s) were not well visualized.  
  
  
  
 
 
 
Signed: 
Coleman Apodaca MD 
11/6/2020 
12:31 PM .

## 2020-11-06 NOTE — PROGRESS NOTES
Daily Progress Note: 11/6/2020 Benito Mcallister MD 
 
Assessment/Plan:  
Cellulitis - reported in the LLE. Does have a very small wound over the left patellar region but not exquisitely warm or tender to touch. No expressible pus. CT above without abscess  
- f/u cultures- 1/4 bottles with gram + cocci- likely contaminant- Repeat BC neg so far 
- continue IV antibiotics - change to po 
- will ask ortho to eval- they have signed off - ID consulted- antibiotic changed to Rocephin 
  
CAD (coronary artery disease) (10/9/2015) - not on ASA, statin 
- continue metoprolol  
  
Type II diabetes mellitus ([facility] (10/9/2015) - ISS  
- BG checks AC TID and qHS  
- resume home meds at discharge  
  
Essential hypertension (1/22/2016) - continue metoprolol 
  
Recurrent deep vein thrombosis (DVT) ([facility] (3/30/2018) - on Eliquis  
  
Chronic anticoagulation (3/30/2018) - Eliquis for a-fib and h/o DVT 
- BLE dopplers + DVT  
  
Sleep apnea (1/5/2019) - CPAP qHS  
  
Atrial fibrillation ([facility] (11/16/2018) - on digoxin and metoprolol  
- continue eliquis  
  
COPD (chronic obstructive pulmonary disease) ([facility] (7/13/2019) - chronically on O2  
- continue nebs  
  
PAD (peripheral artery disease) ([facility] (7/13/2019)/PVD 
  
Severe obesity ([facility] (7/30/2019) - counseled on weight loss  
  
Chronic respiratory failure with hypoxia ([facility] (11/2/2020) 
- on 3L Disp 
- Back to SNF- repeat COVID today and CXR Problem List: 
Problem List as of 11/6/2020 Date Reviewed: 11/2/2020 Codes Class Noted - Resolved Leukocytosis ICD-10-CM: D67.488 ICD-9-CM: 288.60  11/3/2020 - Present Chronic respiratory failure with hypoxia [facility]) ICD-10-CM: J96.11 
ICD-9-CM: 518.83, 799.02  11/2/2020 - Present Overview Signed 11/2/2020 10:12 PM by Maria Teresa Fenton MD  
  On 3L NC at home Cellulitis of lower extremity ICD-10-CM: L03.119 ICD-9-CM: 682.6  3/23/2020 - Present ASO (arteriosclerosis obliterans) ICD-10-CM: I70.90 ICD-9-CM: 440.9  9/5/2019 - Present PVD (peripheral vascular disease) ([facility] ICD-10-CM: I73.9 ICD-9-CM: 443.9  8/1/2019 - Present Severe obesity ([facility] ICD-10-CM: E66.01 
ICD-9-CM: 278.01  7/30/2019 - Present COPD (chronic obstructive pulmonary disease) (HCC) ICD-10-CM: J44.9 ICD-9-CM: 170  7/13/2019 - Present Normocytic anemia ICD-10-CM: D64.9 ICD-9-CM: 285.9  7/13/2019 - Present PAD (peripheral artery disease) (HCC) ICD-10-CM: I73.9 ICD-9-CM: 443.9  7/13/2019 - Present Mild intermittent asthma ICD-10-CM: J45.20 ICD-9-CM: 493.90  5/17/2019 - Present Brachial artery thrombus (HCC) ICD-10-CM: V77.1 ICD-9-CM: 444.21  4/26/2019 - Present Sleep apnea ICD-10-CM: G47.30 ICD-9-CM: 780.57  1/5/2019 - Present Overview Signed 1/5/2019  8:41 PM by Apolonia Lomeli DO  
  wears CPAP Atrial fibrillation [facility]) ICD-10-CM: I48.91 
ICD-9-CM: 427.31  11/16/2018 - Present Obesity (BMI 30-39.9) (Chronic) ICD-10-CM: J52.7 ICD-9-CM: 278.00  11/13/2018 - Present Recurrent deep vein thrombosis (DVT) (HCC) ICD-10-CM: I82.409 ICD-9-CM: 453.40  3/30/2018 - Present Chronic anticoagulation (Chronic) ICD-10-CM: Z79.01 
ICD-9-CM: V58.61  3/30/2018 - Present Essential hypertension (Chronic) ICD-10-CM: I10 
ICD-9-CM: 401.9  1/22/2016 - Present CAD (coronary artery disease) ICD-10-CM: I25.10 ICD-9-CM: 414.00  10/9/2015 - Present Type II diabetes mellitus (HCC) (Chronic) ICD-10-CM: E11.9 ICD-9-CM: 250.00  10/9/2015 - Present RESOLVED: Syncope and collapse ICD-10-CM: R55 
ICD-9-CM: 780.2  9/29/2020 - 11/2/2020 RESOLVED: Cellulitis ICD-10-CM: L03.90 ICD-9-CM: 682.9  3/23/2020 - 5/29/2020 RESOLVED: Hypokalemia ICD-10-CM: E87.6 ICD-9-CM: 276.8  3/23/2020 - 5/29/2020 RESOLVED: Hyponatremia ICD-10-CM: E87.1 ICD-9-CM: 276.1  3/23/2020 - 5/29/2020 RESOLVED: Diarrhea ICD-10-CM: R19.7 ICD-9-CM: 787.91  3/23/2020 - 5/29/2020 RESOLVED: Syncope and collapse ICD-10-CM: R55 
ICD-9-CM: 780.2  7/13/2019 - 5/29/2020 RESOLVED: UTI (urinary tract infection) ICD-10-CM: N39.0 ICD-9-CM: 599.0  7/13/2019 - 11/2/2020 * (Principal) RESOLVED: Sepsis ([facility] ICD-10-CM: A41.9 ICD-9-CM: 038.9, 995.91  7/13/2019 - 11/3/2020 RESOLVED: Leg wound, left ICD-10-CM: O45.933J ICD-9-CM: 891.0  7/13/2019 - 5/29/2020 RESOLVED: Leg laceration, right, initial encounter ICD-10-CM: R51.615L ICD-9-CM: 894.0  7/13/2019 - 5/29/2020 RESOLVED: Cellulitis of right upper arm ICD-10-CM: L03.113 ICD-9-CM: 682.3  5/17/2019 - 5/29/2020 RESOLVED: Gangrene of finger of right hand ([facility] ICD-10-CM: K79 
ICD-9-CM: 785.4  5/17/2019 - 11/2/2020 RESOLVED: Bowel obstruction ([facility] ICD-10-CM: W08.492 ICD-9-CM: 560.9  1/8/2019 - 5/29/2020 RESOLVED: Partial small bowel obstruction ([facility] ICD-10-CM: K56.600 ICD-9-CM: 560.9  1/5/2019 - 5/29/2020 RESOLVED: Dyspnea ICD-10-CM: R06.00 
ICD-9-CM: 786.09  11/13/2018 - 5/29/2020 RESOLVED: ARF (acute renal failure) (HCC) ICD-10-CM: N17.9 ICD-9-CM: 584.9  11/13/2018 - 5/29/2020 RESOLVED: Closed wedge compression fracture of T7 vertebra ([facility] ICD-10-CM: C50.737J ICD-9-CM: 805.2  11/13/2018 - 5/29/2020 RESOLVED: Type 2 diabetes with nephropathy ([facility] ICD-10-CM: E11.21 
ICD-9-CM: 250.40, 583.81  10/1/2018 - 11/2/2020 RESOLVED: Chest pain ICD-10-CM: R07.9 ICD-9-CM: 786.50  6/27/2018 - 5/29/2020 RESOLVED: Abdominal pain ICD-10-CM: R10.9 ICD-9-CM: 789.00  6/27/2018 - 5/29/2020 RESOLVED: Coronary artery disease involving native coronary artery without angina pectoris (Chronic) ICD-10-CM: I25.10 ICD-9-CM: 414.01  1/22/2016 - 11/2/2020 RESOLVED: HTN (hypertension) ICD-10-CM: I10 
ICD-9-CM: 401.9  10/9/2015 - 1/22/2016 RESOLVED: NSTEMI (non-ST elevated myocardial infarction) ([facility] ICD-10-CM: I21.4 ICD-9-CM: 410.70  10/9/2015 - 2020 HPI:  
Ms. Shanell Haskins is a 69 y.o.  female with PMH of a-fib, CAD, DM, asthma, h/p DVT admitted for LLE \"cellulitis\". Per pt was sent here by her wound care doctor for further evaluation. Denies fevers/chills. No N/V. Has noted some drainage from her L knee wound. Does to have hardware in her L leg  (Dr Yovany Combs) 
 
11/3: Has been at [facility] in the [facility] for the last 2 weeks. Sent to the ER as she was having more drainage from her knee. No other complaints. : Ortho consult completed and do not feel joint is septic. Will ask ID to see. Cultures neg so far but was on antibiotics prior to admission. She has no complaints at this time. Dopplers not done yet so will reorder. : BC  bottles with gram + cocci- likely contaminant. Afebrile. ID has seen and antibiotic changed to Rocephin. COVID neg and will repeat today as she needs  2 neg results. Dopplers + DVT. On Eliquis. : No complaints this am. 2nd COVID pending. CXR is ok. Repeat BC pending. She is medically stable for rehab back to [address] [place] [address] if tests are neg. Hope to be able to send later today. 12p: Second COVID neg. BC neg so far. [address] [place] [address] has accepted her for later today. Review of Systems: A comprehensive review of systems was negative except for that written in the HPI. Objective:  
Physical Exam:  
 
Visit Vitals BP (!) 141/66 (BP 1 Location: Left arm, BP Patient Position: At rest) Pulse 99 Temp 97.7 °F (36.5 °C) Resp 17 Wt 255 lb (115.7 kg) SpO2 99% BMI 39.94 kg/m² O2 Flow Rate (L/min): 3 l/min O2 Device: Nasal cannula Temp (24hrs), Av.1 °F (36.7 °C), Min:97.7 °F (36.5 °C), Max:98.4 °F (36.9 °C) 1901 - 700 In: 36 [I.V.:40] Out: 0    701 - 1900 In: 36 [P.O.:1100; I.V.:30] Out: 0 General:  Alert, cooperative, no distress Obese Head:  Normocephalic, without obvious abnormality, atraumatic. Eyes:  Conjunctivae/corneas clear. PERRL, EOMs intact. Nose: Nares normal. Septum midline. Mucosa normal. No drainage or sinus tenderness. Throat: Lips, mucosa, and tongue normal.   
Neck: Supple, symmetrical, trachea midline, no adenopathy, thyroid: no enlargement/tenderness/nodules, no carotid bruit and no JVD. Back:   Symmetric, no curvature. ROM normal. No CVA tenderness. Lungs:   Clear to auscultation bilaterally. Chest wall:  No tenderness or deformity. Heart:  Regular rate and rhythm, S1, S2 normal, no murmur, click, rub or gallop. Abdomen:   Soft, non-tender. Bowel sounds normal. No masses,  No organomegaly. Extremities: Extremities swollen and weeping, erythematous bilaterally, left knee with small amount of drainage present on bandage, both LE scaly and dry,  atraumatic, no cyanosis  No calf tenderness or cords. Pulses: 1+ and symmetric all extremities. Skin: Skin turgor normal.  
Neurologic: CNII-XII intact. Alert and oriented X 3. Fine motor of hands and fingers normal.   equal.  No cogwheeling or rigidity. Gait not tested at this time. Sensation grossly normal to touch. Gross motor of extremities normal.    
 
Data Review: CT LE 11/2/20 FINDINGS: The bones are mildly osteopenic. There is no fracture or dislocation 
or osseous attenuation abnormality to suggest osteomyelitis. 
  
There is no joint prosthesis demonstrated. There is no substantial knee or ankle 
arthrosis. No knee1 or ankle effusion is shown. 
  
The muscles are normal in size and attenuation. No compartmental mass or 
collection is shown.  There are scattered atherosclerotic calcifications which 
are greater on the left especially at the proximal left popliteal artery. 
  
There is diffuse bilateral subcutaneous strand-like opacification with areas of 
 subdermal confluence anterolaterally on the left at the level of the knee. Diffuse skin thickening is shown. 
  
IMPRESSION:  
Diffuse bilateral subcutaneous strand-like opacification which could be on 
inflammatory basis or related to chronic venous stasis and lymphedema. There are 
areas of subdermal confluence of nonspecific nature anteromedially at the level 
of the knee. There is no localized intramuscular collection, joint effusion, or 
CT imaging evidence for osteomyelitis. Lower Extremity Venous Findings Dopplers BLE 11/4/20 Right Lower Venous Technically difficult exam due to: patient body habitus and marked edema. Age indeterminate non-occlusive thrombus present in the right common femoral vein. The profunda femoral, proximal femoral, mid femoral, distal femoral, popliteal and saphenous femoral junction vein(s) were imaged in the transverse and longitudinal planes. The vessels showed normal color filling and compressibility. Doppler interrogation of the veins showed phasic and spontaneous flow. The posterior tibial and peroneal vein(s) were not well visualized. Left Lower Venous Technically difficult exam due to: patient body habitus and marked edema. The common femoral, saphenous femoral junction, proximal femoral, mid femoral and distal femoral vein(s) were imaged in the transverse and longitudinal planes. The vessels showed normal color filling and compressibility. Doppler interrogation of the veins showed phasic and spontaneous flow. The popliteal, posterior tibial and peroneal vein(s) were not well visualized. Recent Days: 
Recent Labs 11/05/20 
0510 WBC 10.6 HGB 9.4* HCT 32.6*  
 Recent Labs 11/05/20 
0510   
K 4.8  
* CO2 26 * BUN 18 CREA 1.02  
CA 8.0* ALB 2.2* TBILI 0.4 ALT 50  
 
 
24 Hour Results: 
Recent Results (from the past 24 hour(s)) GLUCOSE, POC  
 Collection Time: 11/05/20  6:49 AM  
Result Value Ref Range Glucose (POC) 115 (H) 65 - 100 mg/dL Performed by Nate Andrade GLUCOSE, POC Collection Time: 11/05/20 11:44 AM  
Result Value Ref Range Glucose (POC) 100 65 - 100 mg/dL Performed by Apolonia Aguilar  (PCT) GLUCOSE, POC Collection Time: 11/05/20  5:26 PM  
Result Value Ref Range Glucose (POC) 168 (H) 65 - 100 mg/dL Performed by Chantale Winkler SARS-COV-2 Collection Time: 11/05/20  6:23 PM  
Result Value Ref Range Specimen source Nasopharyngeal    
 SARS-CoV-2 PENDING   
 SARS-CoV-2 PENDING Specimen source Nasopharyngeal    
 COVID-19 rapid test PENDING Specimen type NP Swab Health status PENDING   
 COVID-19 PENDING   
GLUCOSE, POC Collection Time: 11/05/20  9:15 PM  
Result Value Ref Range Glucose (POC) 189 (H) 65 - 100 mg/dL Performed by Cindi Guillermo (PCT) Medications reviewed Current Facility-Administered Medications Medication Dose Route Frequency  cefTRIAXone (ROCEPHIN) 2 g in sterile water (preservative free) 20 mL IV syringe  2 g IntraVENous Q24H  
 sodium chloride (NS) flush 5-40 mL  5-40 mL IntraVENous Q8H  
 sodium chloride (NS) flush 5-40 mL  5-40 mL IntraVENous PRN  
 acetaminophen (TYLENOL) tablet 650 mg  650 mg Oral Q4H PRN  
 HYDROcodone-acetaminophen (NORCO) 5-325 mg per tablet 1 Tab  1 Tab Oral Q4H PRN  
 naloxone (NARCAN) injection 0.4 mg  0.4 mg IntraVENous PRN  
 ondansetron (ZOFRAN) injection 4 mg  4 mg IntraVENous Q4H PRN  
 glucose chewable tablet 16 g  4 Tab Oral PRN  
 dextrose (D50W) injection syrg 12.5-25 g  25-50 mL IntraVENous PRN  
 glucagon (GLUCAGEN) injection 1 mg  1 mg IntraMUSCular PRN  
 insulin lispro (HUMALOG) injection   SubCUTAneous AC&HS  
 apixaban (ELIQUIS) tablet 5 mg  5 mg Oral BID  cholecalciferol (VITAMIN D3) (1000 Units /25 mcg) tablet 2 Tab  2,000 Units Oral DAILY  cyanocobalamin (VITAMIN B12) tablet 1,000 mcg  1,000 mcg Oral DAILY  digoxin (LANOXIN) tablet 0.125 mg  0.125 mg Oral DAILY  DULoxetine (CYMBALTA) capsule 60 mg  60 mg Oral DAILY  ferrous sulfate tablet 325 mg  325 mg Oral DAILY WITH BREAKFAST  lactobac ac& pc-s.therm-b.anim (NUBIA Q/RISAQUAD)  1 Cap Oral DAILY  loperamide (IMODIUM) capsule 4 mg  4 mg Oral BID  magnesium oxide (MAG-OX) tablet 400 mg  400 mg Oral QHS  mirtazapine (REMERON) tablet 15 mg  15 mg Oral QHS  arformoterol 15 mcg/budesonide 0.5 mg neb solution   Nebulization BID RT  
 montelukast (SINGULAIR) tablet 10 mg  10 mg Oral QPM  
 pantoprazole (PROTONIX) tablet 40 mg  40 mg Oral ACB&D  potassium chloride SR (KLOR-CON 10) tablet 10 mEq  10 mEq Oral BID  predniSONE (DELTASONE) tablet 10 mg  10 mg Oral BID WITH MEALS  
 albuterol-ipratropium (DUO-NEB) 2.5 MG-0.5 MG/3 ML  3 mL Nebulization Q6H PRN  
 albuterol (ACCUNEB) nebulizer solution 1.25 mg  1.25 mg Nebulization Q2H PRN Care Plan discussed with: Patient/Family Total time spent with patient and review of records: 30 minutes.  
 
Jil Toth MD

## 2020-11-12 NOTE — PROGRESS NOTES
Physician Progress Note Isaias Young 
[facility] #:                  [id number] :                       1945 ADMIT DATE:       2020 1:45 PM 
[address] DATE:        2020 5:18 PM 
RESPONDING 
PROVIDER #:        Saida Martin MD 
 
 
 
 
QUERY TEXT: 
 
Dr Fernanda Cisneros, Patient admitted with cellulitis LLE. Documentation reflects sepsis noted in the discharge summary however due to lack of supporting clinical criteria it may be challenged. If possible, please clarify if sepsis was: The medical record reflects the following: 
Risk Factors: 69 yr. old female admitted with LLE cellulitis. Clinical Indicators: ED did not note sepsis. No evidence of septic joint noted in PN. Blood cultures negative. DC summary notes admission diagnosis as Sepsis, WBC 13.1, tachycardia, elevated temp, Lactic acid 1.0. Treatment: IV antibiotics, ID consult Options provided: 
-- Sepsis ruled out after study -- Sepsis POA confirmed after study, please provide clinical indicators, . [place] -- Other - I will add my own diagnosis -- Disagree - Not applicable / Not valid -- Disagree - Clinically unable to determine / Unknown 
-- Refer to Clinical Documentation Reviewer PROVIDER RESPONSE TEXT: 
 
Sepsis ruled out after study.  
 
Query created by: Johanna Williamson on 2020 10:37 AM 
 
 
Electronically signed by:  Saida Martin MD 2020 9:45 AM

## 2020-11-19 NOTE — TELEPHONE ENCOUNTER
----- Message from [address] sent at 11/19/2020 11:40 AM EST -----  Regarding: Dr. Roberta Pike  General Message/Vendor Calls    Caller's first and last name: Joan parikh [place] [address]       Reason for call: New Pt Appt/Infectious Disease      Callback required yes/no and why: Yes      Best contact number(s): [phone] ext. [phone]. Details to clarify the request: Dr. Ayanna Banuelos from [place] [address] requesting appt for pt for condition of cellulitis on lower extremities and multiple wounds which will not heal. Medical records will be faxed from Dr. Sylvester Blackman office for pt. Please follow up with rep Marilee to schedule pt's appt with Dr. Zurdo Cobb for Infectious Disease at [phone] ext. [phone].       Isidro Ram Done

## 2020-11-20 NOTE — TELEPHONE ENCOUNTER
Called Marilee, and left a voice message, advising Dr. Katie Klein recommends pt be seen by [facility] wound center. Advised Marilee to call office back with questions or concerns.

## 2020-12-07 NOTE — PROGRESS NOTES
1st attempt rehab follow up call placed to patient. No answer at this time, will follow up in 1 day.

## 2020-12-08 NOTE — PROGRESS NOTES
Second rehab follow up attempt, no answer unable to leave a message. Patient has not return to ED/Hosp in the last 30 days. Will close episode at this time.

## 2021-01-01 ENCOUNTER — HOME CARE VISIT (OUTPATIENT)
Dept: SCHEDULING | Facility: HOME HEALTH | Age: 76
End: 2021-01-01
Payer: MEDICARE

## 2021-01-01 ENCOUNTER — HOME CARE VISIT (OUTPATIENT)
Dept: HOSPICE | Facility: HOSPICE | Age: 76
End: 2021-01-01
Payer: MEDICARE

## 2021-01-01 ENCOUNTER — APPOINTMENT (OUTPATIENT)
Dept: GENERAL RADIOLOGY | Age: 76
DRG: 377 | End: 2021-01-01
Attending: FAMILY MEDICINE
Payer: MEDICARE

## 2021-01-01 ENCOUNTER — PATIENT OUTREACH (OUTPATIENT)
Dept: CASE MANAGEMENT | Age: 76
End: 2021-01-01

## 2021-01-01 ENCOUNTER — APPOINTMENT (OUTPATIENT)
Dept: GENERAL RADIOLOGY | Age: 76
DRG: 291 | End: 2021-01-01
Attending: NURSE PRACTITIONER
Payer: MEDICARE

## 2021-01-01 ENCOUNTER — APPOINTMENT (OUTPATIENT)
Dept: CT IMAGING | Age: 76
DRG: 377 | End: 2021-01-01
Attending: NURSE PRACTITIONER
Payer: MEDICARE

## 2021-01-01 ENCOUNTER — HOSPITAL ENCOUNTER (INPATIENT)
Age: 76
LOS: 10 days | Discharge: HOSPICE | DRG: 377 | End: 2021-03-05
Attending: EMERGENCY MEDICINE | Admitting: HOSPITALIST
Payer: MEDICARE

## 2021-01-01 ENCOUNTER — HOSPITAL ENCOUNTER (EMERGENCY)
Age: 76
Discharge: HOME | End: 2021-01-22
Attending: EMERGENCY MEDICINE | Admitting: EMERGENCY MEDICINE
Payer: MEDICARE

## 2021-01-01 ENCOUNTER — HOSPITAL ENCOUNTER (INPATIENT)
Age: 76
LOS: 14 days | Discharge: NURSING, REHAB OR LONG-TERM CARE | DRG: 871 | End: 2021-01-15
Attending: EMERGENCY MEDICINE | Admitting: FAMILY MEDICINE
Payer: MEDICARE

## 2021-01-01 ENCOUNTER — APPOINTMENT (OUTPATIENT)
Dept: GENERAL RADIOLOGY | Age: 76
DRG: 377 | End: 2021-01-01
Attending: EMERGENCY MEDICINE
Payer: MEDICARE

## 2021-01-01 ENCOUNTER — APPOINTMENT (OUTPATIENT)
Dept: VASCULAR SURGERY | Age: 76
DRG: 377 | End: 2021-01-01
Attending: PHYSICIAN ASSISTANT
Payer: MEDICARE

## 2021-01-01 ENCOUNTER — APPOINTMENT (OUTPATIENT)
Dept: GENERAL RADIOLOGY | Age: 76
DRG: 291 | End: 2021-01-01
Attending: EMERGENCY MEDICINE
Payer: MEDICARE

## 2021-01-01 ENCOUNTER — HOSPITAL ENCOUNTER (INPATIENT)
Age: 76
LOS: 14 days | Discharge: NURSING, REHAB OR LONG-TERM CARE | DRG: 291 | End: 2021-02-09
Attending: EMERGENCY MEDICINE | Admitting: FAMILY MEDICINE
Payer: MEDICARE

## 2021-01-01 ENCOUNTER — APPOINTMENT (OUTPATIENT)
Dept: GENERAL RADIOLOGY | Age: 76
DRG: 871 | End: 2021-01-01
Attending: EMERGENCY MEDICINE
Payer: MEDICARE

## 2021-01-01 ENCOUNTER — HOSPITAL ENCOUNTER (EMERGENCY)
Age: 76
Discharge: NURSING, REHAB OR LONG-TERM CARE | End: 2021-02-16
Attending: EMERGENCY MEDICINE
Payer: MEDICARE

## 2021-01-01 ENCOUNTER — TELEPHONE (OUTPATIENT)
Dept: CARDIOLOGY CLINIC | Age: 76
End: 2021-01-01

## 2021-01-01 ENCOUNTER — APPOINTMENT (OUTPATIENT)
Dept: GENERAL RADIOLOGY | Age: 76
DRG: 871 | End: 2021-01-01
Attending: FAMILY MEDICINE
Payer: MEDICARE

## 2021-01-01 ENCOUNTER — ANESTHESIA (OUTPATIENT)
Dept: ENDOSCOPY | Age: 76
DRG: 871 | End: 2021-01-01
Payer: MEDICARE

## 2021-01-01 ENCOUNTER — APPOINTMENT (OUTPATIENT)
Dept: NON INVASIVE DIAGNOSTICS | Age: 76
DRG: 291 | End: 2021-01-01
Attending: NURSE PRACTITIONER
Payer: MEDICARE

## 2021-01-01 ENCOUNTER — HOSPICE ADMISSION (OUTPATIENT)
Dept: HOSPICE | Facility: HOSPICE | Age: 76
End: 2021-01-01
Payer: MEDICARE

## 2021-01-01 ENCOUNTER — ANESTHESIA EVENT (OUTPATIENT)
Dept: ENDOSCOPY | Age: 76
DRG: 871 | End: 2021-01-01
Payer: MEDICARE

## 2021-01-01 ENCOUNTER — APPOINTMENT (OUTPATIENT)
Dept: GENERAL RADIOLOGY | Age: 76
DRG: 291 | End: 2021-01-01
Attending: INTERNAL MEDICINE
Payer: MEDICARE

## 2021-01-01 ENCOUNTER — APPOINTMENT (OUTPATIENT)
Dept: GENERAL RADIOLOGY | Age: 76
DRG: 377 | End: 2021-01-01
Attending: NURSE PRACTITIONER
Payer: MEDICARE

## 2021-01-01 VITALS
HEART RATE: 118 BPM | HEIGHT: 67 IN | BODY MASS INDEX: 37.96 KG/M2 | RESPIRATION RATE: 18 BRPM | TEMPERATURE: 98.6 F | OXYGEN SATURATION: 87 % | SYSTOLIC BLOOD PRESSURE: 97 MMHG | DIASTOLIC BLOOD PRESSURE: 57 MMHG | WEIGHT: 241.84 LBS

## 2021-01-01 VITALS
OXYGEN SATURATION: 100 % | SYSTOLIC BLOOD PRESSURE: 129 MMHG | BODY MASS INDEX: 36.41 KG/M2 | HEART RATE: 99 BPM | HEIGHT: 67 IN | TEMPERATURE: 97.8 F | WEIGHT: 232 LBS | DIASTOLIC BLOOD PRESSURE: 65 MMHG | RESPIRATION RATE: 16 BRPM

## 2021-01-01 VITALS
DIASTOLIC BLOOD PRESSURE: 38 MMHG | TEMPERATURE: 99 F | HEIGHT: 67 IN | RESPIRATION RATE: 18 BRPM | BODY MASS INDEX: 38.06 KG/M2 | OXYGEN SATURATION: 97 % | HEART RATE: 83 BPM | WEIGHT: 242.51 LBS | SYSTOLIC BLOOD PRESSURE: 99 MMHG

## 2021-01-01 VITALS
RESPIRATION RATE: 17 BRPM | HEART RATE: 69 BPM | DIASTOLIC BLOOD PRESSURE: 46 MMHG | TEMPERATURE: 98.1 F | SYSTOLIC BLOOD PRESSURE: 133 MMHG | BODY MASS INDEX: 39.16 KG/M2 | WEIGHT: 250 LBS | OXYGEN SATURATION: 97 %

## 2021-01-01 VITALS
DIASTOLIC BLOOD PRESSURE: 54 MMHG | SYSTOLIC BLOOD PRESSURE: 144 MMHG | HEART RATE: 88 BPM | HEIGHT: 67 IN | OXYGEN SATURATION: 97 % | TEMPERATURE: 98.2 F | BODY MASS INDEX: 39.24 KG/M2 | WEIGHT: 250 LBS | RESPIRATION RATE: 18 BRPM

## 2021-01-01 VITALS
BODY MASS INDEX: 39.87 KG/M2 | HEIGHT: 67 IN | DIASTOLIC BLOOD PRESSURE: 61 MMHG | OXYGEN SATURATION: 98 % | SYSTOLIC BLOOD PRESSURE: 115 MMHG | HEART RATE: 109 BPM | TEMPERATURE: 97.7 F | WEIGHT: 254 LBS | RESPIRATION RATE: 18 BRPM

## 2021-01-01 VITALS — RESPIRATION RATE: 9 BRPM | HEART RATE: 68 BPM

## 2021-01-01 DIAGNOSIS — D62 ACUTE BLOOD LOSS ANEMIA: ICD-10-CM

## 2021-01-01 DIAGNOSIS — Z79.01 CHRONIC ANTICOAGULATION: Chronic | ICD-10-CM

## 2021-01-01 DIAGNOSIS — I25.10 CORONARY ARTERY DISEASE INVOLVING NATIVE CORONARY ARTERY OF NATIVE HEART WITHOUT ANGINA PECTORIS: ICD-10-CM

## 2021-01-01 DIAGNOSIS — D64.9 ANEMIA, UNSPECIFIED TYPE: Primary | ICD-10-CM

## 2021-01-01 DIAGNOSIS — I50.23 ACUTE ON CHRONIC SYSTOLIC CONGESTIVE HEART FAILURE (HCC): ICD-10-CM

## 2021-01-01 DIAGNOSIS — I50.23 ACUTE ON CHRONIC SYSTOLIC CHF (CONGESTIVE HEART FAILURE) (HCC): ICD-10-CM

## 2021-01-01 DIAGNOSIS — I48.0 PAROXYSMAL ATRIAL FIBRILLATION (HCC): ICD-10-CM

## 2021-01-01 DIAGNOSIS — Z71.89 GOALS OF CARE, COUNSELING/DISCUSSION: ICD-10-CM

## 2021-01-01 DIAGNOSIS — J44.9 CHRONIC OBSTRUCTIVE PULMONARY DISEASE, UNSPECIFIED COPD TYPE (HCC): ICD-10-CM

## 2021-01-01 DIAGNOSIS — R52 PAIN: ICD-10-CM

## 2021-01-01 DIAGNOSIS — J18.9 COMMUNITY ACQUIRED PNEUMONIA OF LEFT LOWER LOBE OF LUNG: ICD-10-CM

## 2021-01-01 DIAGNOSIS — Z71.89 ADVANCED CARE PLANNING/COUNSELING DISCUSSION: ICD-10-CM

## 2021-01-01 DIAGNOSIS — R53.81 DEBILITY: ICD-10-CM

## 2021-01-01 DIAGNOSIS — I48.91 ATRIAL FIBRILLATION, UNSPECIFIED TYPE (HCC): ICD-10-CM

## 2021-01-01 DIAGNOSIS — I48.0 PAF (PAROXYSMAL ATRIAL FIBRILLATION) (HCC): ICD-10-CM

## 2021-01-01 DIAGNOSIS — E11.65 UNCONTROLLED TYPE 2 DIABETES MELLITUS WITH HYPERGLYCEMIA (HCC): ICD-10-CM

## 2021-01-01 DIAGNOSIS — Z71.1 CONCERN ABOUT END OF LIFE: ICD-10-CM

## 2021-01-01 DIAGNOSIS — R06.02 SOB (SHORTNESS OF BREATH): ICD-10-CM

## 2021-01-01 DIAGNOSIS — J18.9 PNEUMONIA OF LEFT LOWER LOBE DUE TO INFECTIOUS ORGANISM: Primary | ICD-10-CM

## 2021-01-01 DIAGNOSIS — I48.19 PERSISTENT ATRIAL FIBRILLATION (HCC): ICD-10-CM

## 2021-01-01 DIAGNOSIS — E63.9 POOR NUTRITION: ICD-10-CM

## 2021-01-01 DIAGNOSIS — I10 ESSENTIAL HYPERTENSION: Chronic | ICD-10-CM

## 2021-01-01 DIAGNOSIS — Z71.89 DNR (DO NOT RESUSCITATE) DISCUSSION: ICD-10-CM

## 2021-01-01 DIAGNOSIS — R53.1 WEAKNESS GENERALIZED: ICD-10-CM

## 2021-01-01 DIAGNOSIS — K92.2 GASTROINTESTINAL HEMORRHAGE, UNSPECIFIED GASTROINTESTINAL HEMORRHAGE TYPE: Primary | ICD-10-CM

## 2021-01-01 DIAGNOSIS — E88.09 HYPOALBUMINEMIA: ICD-10-CM

## 2021-01-01 LAB
25(OH)D3 SERPL-MCNC: 33.7 NG/ML (ref 30–100)
ABO + RH BLD: NORMAL
ALBUMIN SERPL ELPH-MCNC: 2.4 G/DL (ref 2.9–4.4)
ALBUMIN SERPL-MCNC: 1.7 G/DL (ref 3.5–5)
ALBUMIN SERPL-MCNC: 1.8 G/DL (ref 3.5–5)
ALBUMIN SERPL-MCNC: 1.8 G/DL (ref 3.5–5)
ALBUMIN SERPL-MCNC: 1.9 G/DL (ref 3.5–5)
ALBUMIN SERPL-MCNC: 1.9 G/DL (ref 3.5–5)
ALBUMIN SERPL-MCNC: 2 G/DL (ref 3.5–5)
ALBUMIN SERPL-MCNC: 2.1 G/DL (ref 3.5–5)
ALBUMIN SERPL-MCNC: 2.2 G/DL (ref 3.5–5)
ALBUMIN SERPL-MCNC: 2.3 G/DL (ref 3.5–5)
ALBUMIN SERPL-MCNC: 2.4 G/DL (ref 3.5–5)
ALBUMIN SERPL-MCNC: 2.5 G/DL (ref 3.5–5)
ALBUMIN/GLOB SERPL: 0.6 {RATIO} (ref 1.1–2.2)
ALBUMIN/GLOB SERPL: 0.7 {RATIO} (ref 1.1–2.2)
ALBUMIN/GLOB SERPL: 0.8 {RATIO} (ref 1.1–2.2)
ALBUMIN/GLOB SERPL: 0.9 {RATIO} (ref 1.1–2.2)
ALBUMIN/GLOB SERPL: 1.3 {RATIO} (ref 0.7–1.7)
ALP SERPL-CCNC: 146 U/L (ref 45–117)
ALP SERPL-CCNC: 146 U/L (ref 45–117)
ALP SERPL-CCNC: 152 U/L (ref 45–117)
ALP SERPL-CCNC: 157 U/L (ref 45–117)
ALP SERPL-CCNC: 160 U/L (ref 45–117)
ALP SERPL-CCNC: 172 U/L (ref 45–117)
ALP SERPL-CCNC: 175 U/L (ref 45–117)
ALP SERPL-CCNC: 182 U/L (ref 45–117)
ALP SERPL-CCNC: 183 U/L (ref 45–117)
ALP SERPL-CCNC: 185 U/L (ref 45–117)
ALP SERPL-CCNC: 186 U/L (ref 45–117)
ALP SERPL-CCNC: 188 U/L (ref 45–117)
ALP SERPL-CCNC: 188 U/L (ref 45–117)
ALP SERPL-CCNC: 189 U/L (ref 45–117)
ALP SERPL-CCNC: 190 U/L (ref 45–117)
ALP SERPL-CCNC: 195 U/L (ref 45–117)
ALP SERPL-CCNC: 200 U/L (ref 45–117)
ALP SERPL-CCNC: 206 U/L (ref 45–117)
ALP SERPL-CCNC: 207 U/L (ref 45–117)
ALP SERPL-CCNC: 210 U/L (ref 45–117)
ALP SERPL-CCNC: 220 U/L (ref 45–117)
ALP SERPL-CCNC: 221 U/L (ref 45–117)
ALP SERPL-CCNC: 222 U/L (ref 45–117)
ALP SERPL-CCNC: 226 U/L (ref 45–117)
ALP SERPL-CCNC: 227 U/L (ref 45–117)
ALP SERPL-CCNC: 229 U/L (ref 45–117)
ALP SERPL-CCNC: 232 U/L (ref 45–117)
ALP SERPL-CCNC: 234 U/L (ref 45–117)
ALP SERPL-CCNC: 235 U/L (ref 45–117)
ALP SERPL-CCNC: 236 U/L (ref 45–117)
ALP SERPL-CCNC: 238 U/L (ref 45–117)
ALP SERPL-CCNC: 242 U/L (ref 45–117)
ALP SERPL-CCNC: 242 U/L (ref 45–117)
ALP SERPL-CCNC: 243 U/L (ref 45–117)
ALP SERPL-CCNC: 244 U/L (ref 45–117)
ALP SERPL-CCNC: 251 U/L (ref 45–117)
ALP SERPL-CCNC: 252 U/L (ref 45–117)
ALPHA1 GLOB SERPL ELPH-MCNC: 0.2 G/DL (ref 0–0.4)
ALPHA2 GLOB SERPL ELPH-MCNC: 0.5 G/DL (ref 0.4–1)
ALT SERPL-CCNC: 10 U/L (ref 12–78)
ALT SERPL-CCNC: 11 U/L (ref 12–78)
ALT SERPL-CCNC: 12 U/L (ref 12–78)
ALT SERPL-CCNC: 14 U/L (ref 12–78)
ALT SERPL-CCNC: 17 U/L (ref 12–78)
ALT SERPL-CCNC: 17 U/L (ref 12–78)
ALT SERPL-CCNC: 19 U/L (ref 12–78)
ALT SERPL-CCNC: 19 U/L (ref 12–78)
ALT SERPL-CCNC: 20 U/L (ref 12–78)
ALT SERPL-CCNC: 22 U/L (ref 12–78)
ALT SERPL-CCNC: 23 U/L (ref 12–78)
ALT SERPL-CCNC: 24 U/L (ref 12–78)
ALT SERPL-CCNC: 25 U/L (ref 12–78)
ALT SERPL-CCNC: 25 U/L (ref 12–78)
ALT SERPL-CCNC: 26 U/L (ref 12–78)
ALT SERPL-CCNC: 27 U/L (ref 12–78)
ALT SERPL-CCNC: 28 U/L (ref 12–78)
ALT SERPL-CCNC: 29 U/L (ref 12–78)
ALT SERPL-CCNC: 30 U/L (ref 12–78)
ALT SERPL-CCNC: 33 U/L (ref 12–78)
ALT SERPL-CCNC: 34 U/L (ref 12–78)
ALT SERPL-CCNC: 34 U/L (ref 12–78)
ALT SERPL-CCNC: 35 U/L (ref 12–78)
ALT SERPL-CCNC: 38 U/L (ref 12–78)
ALT SERPL-CCNC: 40 U/L (ref 12–78)
ALT SERPL-CCNC: 49 U/L (ref 12–78)
ALT SERPL-CCNC: 65 U/L (ref 12–78)
ANION GAP SERPL CALC-SCNC: 0 MMOL/L (ref 5–15)
ANION GAP SERPL CALC-SCNC: 0 MMOL/L (ref 5–15)
ANION GAP SERPL CALC-SCNC: 1 MMOL/L (ref 5–15)
ANION GAP SERPL CALC-SCNC: 1 MMOL/L (ref 5–15)
ANION GAP SERPL CALC-SCNC: 2 MMOL/L (ref 5–15)
ANION GAP SERPL CALC-SCNC: 3 MMOL/L (ref 5–15)
ANION GAP SERPL CALC-SCNC: 4 MMOL/L (ref 5–15)
ANION GAP SERPL CALC-SCNC: 5 MMOL/L (ref 5–15)
ANION GAP SERPL CALC-SCNC: 6 MMOL/L (ref 5–15)
ANION GAP SERPL CALC-SCNC: 6 MMOL/L (ref 5–15)
ANION GAP SERPL CALC-SCNC: 7 MMOL/L (ref 5–15)
ANION GAP SERPL CALC-SCNC: 7 MMOL/L (ref 5–15)
ANION GAP SERPL CALC-SCNC: 9 MMOL/L (ref 5–15)
ANION GAP SERPL CALC-SCNC: 9 MMOL/L (ref 5–15)
ANION GAP SERPL CALC-SCNC: ABNORMAL MMOL/L (ref 5–15)
APPEARANCE UR: ABNORMAL
APPEARANCE UR: CLEAR
APTT PPP: 25.3 SEC (ref 22.1–31)
APTT PPP: 26.1 SEC (ref 22.1–31)
AST SERPL-CCNC: 13 U/L (ref 15–37)
AST SERPL-CCNC: 14 U/L (ref 15–37)
AST SERPL-CCNC: 15 U/L (ref 15–37)
AST SERPL-CCNC: 16 U/L (ref 15–37)
AST SERPL-CCNC: 17 U/L (ref 15–37)
AST SERPL-CCNC: 17 U/L (ref 15–37)
AST SERPL-CCNC: 18 U/L (ref 15–37)
AST SERPL-CCNC: 18 U/L (ref 15–37)
AST SERPL-CCNC: 19 U/L (ref 15–37)
AST SERPL-CCNC: 19 U/L (ref 15–37)
AST SERPL-CCNC: 20 U/L (ref 15–37)
AST SERPL-CCNC: 21 U/L (ref 15–37)
AST SERPL-CCNC: 21 U/L (ref 15–37)
AST SERPL-CCNC: 22 U/L (ref 15–37)
AST SERPL-CCNC: 22 U/L (ref 15–37)
AST SERPL-CCNC: 23 U/L (ref 15–37)
AST SERPL-CCNC: 24 U/L (ref 15–37)
AST SERPL-CCNC: 25 U/L (ref 15–37)
AST SERPL-CCNC: 26 U/L (ref 15–37)
AST SERPL-CCNC: 27 U/L (ref 15–37)
AST SERPL-CCNC: 27 U/L (ref 15–37)
AST SERPL-CCNC: 28 U/L (ref 15–37)
AST SERPL-CCNC: 29 U/L (ref 15–37)
AST SERPL-CCNC: 31 U/L (ref 15–37)
AST SERPL-CCNC: 33 U/L (ref 15–37)
AST SERPL-CCNC: 40 U/L (ref 15–37)
AST SERPL-CCNC: 41 U/L (ref 15–37)
AST SERPL-CCNC: 44 U/L (ref 15–37)
AST SERPL-CCNC: 48 U/L (ref 15–37)
ATRIAL RATE: 113 BPM
ATRIAL RATE: 136 BPM
ATRIAL RATE: 79 BPM
ATRIAL RATE: 86 BPM
B-GLOBULIN SERPL ELPH-MCNC: 0.8 G/DL (ref 0.7–1.3)
BACTERIA SPEC CULT: ABNORMAL
BACTERIA SPEC CULT: NORMAL
BACTERIA SPEC CULT: NORMAL
BACTERIA URNS QL MICRO: ABNORMAL /HPF
BACTERIA URNS QL MICRO: ABNORMAL /HPF
BASOPHILS # BLD: 0 K/UL (ref 0–0.1)
BASOPHILS # BLD: 0.1 K/UL (ref 0–0.1)
BASOPHILS # BLD: 0.2 K/UL (ref 0–0.1)
BASOPHILS # BLD: 0.3 K/UL (ref 0–0.1)
BASOPHILS NFR BLD: 0 % (ref 0–1)
BASOPHILS NFR BLD: 1 % (ref 0–1)
BASOPHILS NFR BLD: 2 % (ref 0–1)
BILIRUB SERPL-MCNC: 0.3 MG/DL (ref 0.2–1)
BILIRUB SERPL-MCNC: 0.4 MG/DL (ref 0.2–1)
BILIRUB SERPL-MCNC: 0.5 MG/DL (ref 0.2–1)
BILIRUB SERPL-MCNC: 0.6 MG/DL (ref 0.2–1)
BILIRUB SERPL-MCNC: 0.7 MG/DL (ref 0.2–1)
BILIRUB SERPL-MCNC: 0.7 MG/DL (ref 0.2–1)
BILIRUB SERPL-MCNC: 0.8 MG/DL (ref 0.2–1)
BILIRUB SERPL-MCNC: 0.9 MG/DL (ref 0.2–1)
BILIRUB SERPL-MCNC: 1 MG/DL (ref 0.2–1)
BILIRUB SERPL-MCNC: 1.1 MG/DL (ref 0.2–1)
BILIRUB SERPL-MCNC: 1.2 MG/DL (ref 0.2–1)
BILIRUB UR QL CFM: NEGATIVE
BILIRUB UR QL: NEGATIVE
BLD PROD TYP BPU: NORMAL
BLOOD GROUP ANTIBODIES SERPL: NORMAL
BNP SERPL-MCNC: 1443 PG/ML
BNP SERPL-MCNC: 1522 PG/ML
BNP SERPL-MCNC: 1550 PG/ML
BNP SERPL-MCNC: 2257 PG/ML
BPU ID: NORMAL
BUN SERPL-MCNC: 11 MG/DL (ref 6–20)
BUN SERPL-MCNC: 14 MG/DL (ref 6–20)
BUN SERPL-MCNC: 17 MG/DL (ref 6–20)
BUN SERPL-MCNC: 18 MG/DL (ref 6–20)
BUN SERPL-MCNC: 19 MG/DL (ref 6–20)
BUN SERPL-MCNC: 20 MG/DL (ref 6–20)
BUN SERPL-MCNC: 21 MG/DL (ref 6–20)
BUN SERPL-MCNC: 21 MG/DL (ref 6–20)
BUN SERPL-MCNC: 22 MG/DL (ref 6–20)
BUN SERPL-MCNC: 22 MG/DL (ref 6–20)
BUN SERPL-MCNC: 23 MG/DL (ref 6–20)
BUN SERPL-MCNC: 24 MG/DL (ref 6–20)
BUN SERPL-MCNC: 24 MG/DL (ref 6–20)
BUN SERPL-MCNC: 25 MG/DL (ref 6–20)
BUN SERPL-MCNC: 26 MG/DL (ref 6–20)
BUN SERPL-MCNC: 27 MG/DL (ref 6–20)
BUN SERPL-MCNC: 28 MG/DL (ref 6–20)
BUN SERPL-MCNC: 29 MG/DL (ref 6–20)
BUN SERPL-MCNC: 29 MG/DL (ref 6–20)
BUN SERPL-MCNC: 30 MG/DL (ref 6–20)
BUN SERPL-MCNC: 32 MG/DL (ref 6–20)
BUN SERPL-MCNC: 33 MG/DL (ref 6–20)
BUN SERPL-MCNC: 36 MG/DL (ref 6–20)
BUN/CREAT SERPL: 11 (ref 12–20)
BUN/CREAT SERPL: 13 (ref 12–20)
BUN/CREAT SERPL: 13 (ref 12–20)
BUN/CREAT SERPL: 14 (ref 12–20)
BUN/CREAT SERPL: 14 (ref 12–20)
BUN/CREAT SERPL: 15 (ref 12–20)
BUN/CREAT SERPL: 15 (ref 12–20)
BUN/CREAT SERPL: 17 (ref 12–20)
BUN/CREAT SERPL: 18 (ref 12–20)
BUN/CREAT SERPL: 19 (ref 12–20)
BUN/CREAT SERPL: 20 (ref 12–20)
BUN/CREAT SERPL: 21 (ref 12–20)
BUN/CREAT SERPL: 22 (ref 12–20)
BUN/CREAT SERPL: 23 (ref 12–20)
BUN/CREAT SERPL: 24 (ref 12–20)
BUN/CREAT SERPL: 25 (ref 12–20)
BUN/CREAT SERPL: 27 (ref 12–20)
BUN/CREAT SERPL: 29 (ref 12–20)
BUN/CREAT SERPL: 31 (ref 12–20)
CALCIUM SERPL-MCNC: 7.2 MG/DL (ref 8.5–10.1)
CALCIUM SERPL-MCNC: 7.3 MG/DL (ref 8.5–10.1)
CALCIUM SERPL-MCNC: 7.4 MG/DL (ref 8.5–10.1)
CALCIUM SERPL-MCNC: 7.5 MG/DL (ref 8.5–10.1)
CALCIUM SERPL-MCNC: 7.6 MG/DL (ref 8.5–10.1)
CALCIUM SERPL-MCNC: 7.7 MG/DL (ref 8.5–10.1)
CALCIUM SERPL-MCNC: 7.8 MG/DL (ref 8.5–10.1)
CALCIUM SERPL-MCNC: 7.9 MG/DL (ref 8.5–10.1)
CALCIUM SERPL-MCNC: 8 MG/DL (ref 8.5–10.1)
CALCIUM SERPL-MCNC: 8.1 MG/DL (ref 8.5–10.1)
CALCIUM SERPL-MCNC: 8.2 MG/DL (ref 8.5–10.1)
CALCIUM SERPL-MCNC: 8.3 MG/DL (ref 8.5–10.1)
CALCULATED P AXIS, ECG09: 33 DEGREES
CALCULATED P AXIS, ECG09: 34 DEGREES
CALCULATED R AXIS, ECG10: -28 DEGREES
CALCULATED R AXIS, ECG10: -38 DEGREES
CALCULATED R AXIS, ECG10: -45 DEGREES
CALCULATED R AXIS, ECG10: -90 DEGREES
CALCULATED T AXIS, ECG11: -7 DEGREES
CALCULATED T AXIS, ECG11: 23 DEGREES
CALCULATED T AXIS, ECG11: 39 DEGREES
CALCULATED T AXIS, ECG11: 68 DEGREES
CC UR VC: ABNORMAL
CHLORIDE SERPL-SCNC: 100 MMOL/L (ref 97–108)
CHLORIDE SERPL-SCNC: 100 MMOL/L (ref 97–108)
CHLORIDE SERPL-SCNC: 101 MMOL/L (ref 97–108)
CHLORIDE SERPL-SCNC: 102 MMOL/L (ref 97–108)
CHLORIDE SERPL-SCNC: 103 MMOL/L (ref 97–108)
CHLORIDE SERPL-SCNC: 104 MMOL/L (ref 97–108)
CHLORIDE SERPL-SCNC: 105 MMOL/L (ref 97–108)
CHLORIDE SERPL-SCNC: 106 MMOL/L (ref 97–108)
CHLORIDE SERPL-SCNC: 107 MMOL/L (ref 97–108)
CHLORIDE SERPL-SCNC: 107 MMOL/L (ref 97–108)
CHLORIDE SERPL-SCNC: 108 MMOL/L (ref 97–108)
CHLORIDE SERPL-SCNC: 94 MMOL/L (ref 97–108)
CHLORIDE SERPL-SCNC: 95 MMOL/L (ref 97–108)
CHLORIDE SERPL-SCNC: 97 MMOL/L (ref 97–108)
CHLORIDE SERPL-SCNC: 97 MMOL/L (ref 97–108)
CHLORIDE SERPL-SCNC: 98 MMOL/L (ref 97–108)
CHLORIDE SERPL-SCNC: 99 MMOL/L (ref 97–108)
CHLORIDE SERPL-SCNC: 99 MMOL/L (ref 97–108)
CO2 SERPL-SCNC: 25 MMOL/L (ref 21–32)
CO2 SERPL-SCNC: 27 MMOL/L (ref 21–32)
CO2 SERPL-SCNC: 28 MMOL/L (ref 21–32)
CO2 SERPL-SCNC: 28 MMOL/L (ref 21–32)
CO2 SERPL-SCNC: 30 MMOL/L (ref 21–32)
CO2 SERPL-SCNC: 31 MMOL/L (ref 21–32)
CO2 SERPL-SCNC: 31 MMOL/L (ref 21–32)
CO2 SERPL-SCNC: 32 MMOL/L (ref 21–32)
CO2 SERPL-SCNC: 33 MMOL/L (ref 21–32)
CO2 SERPL-SCNC: 34 MMOL/L (ref 21–32)
CO2 SERPL-SCNC: 35 MMOL/L (ref 21–32)
CO2 SERPL-SCNC: 36 MMOL/L (ref 21–32)
CO2 SERPL-SCNC: 36 MMOL/L (ref 21–32)
CO2 SERPL-SCNC: 37 MMOL/L (ref 21–32)
CO2 SERPL-SCNC: 38 MMOL/L (ref 21–32)
CO2 SERPL-SCNC: 39 MMOL/L (ref 21–32)
CO2 SERPL-SCNC: 39 MMOL/L (ref 21–32)
CO2 SERPL-SCNC: 41 MMOL/L (ref 21–32)
CO2 SERPL-SCNC: 41 MMOL/L (ref 21–32)
COLLECT DURATION TIME UR: 24 HR
COLOR UR: ABNORMAL
COLOR UR: ABNORMAL
COMMENT, HOLDF: NORMAL
COVID-19 RAPID TEST, COVR: NOT DETECTED
CREAT SERPL-MCNC: 0.78 MG/DL (ref 0.55–1.02)
CREAT SERPL-MCNC: 0.95 MG/DL (ref 0.55–1.02)
CREAT SERPL-MCNC: 0.96 MG/DL (ref 0.55–1.02)
CREAT SERPL-MCNC: 0.98 MG/DL (ref 0.55–1.02)
CREAT SERPL-MCNC: 0.98 MG/DL (ref 0.55–1.02)
CREAT SERPL-MCNC: 1.03 MG/DL (ref 0.55–1.02)
CREAT SERPL-MCNC: 1.04 MG/DL (ref 0.55–1.02)
CREAT SERPL-MCNC: 1.05 MG/DL (ref 0.55–1.02)
CREAT SERPL-MCNC: 1.07 MG/DL (ref 0.55–1.02)
CREAT SERPL-MCNC: 1.07 MG/DL (ref 0.55–1.02)
CREAT SERPL-MCNC: 1.08 MG/DL (ref 0.55–1.02)
CREAT SERPL-MCNC: 1.1 MG/DL (ref 0.55–1.02)
CREAT SERPL-MCNC: 1.11 MG/DL (ref 0.55–1.02)
CREAT SERPL-MCNC: 1.12 MG/DL (ref 0.55–1.02)
CREAT SERPL-MCNC: 1.16 MG/DL (ref 0.55–1.02)
CREAT SERPL-MCNC: 1.16 MG/DL (ref 0.55–1.02)
CREAT SERPL-MCNC: 1.17 MG/DL (ref 0.55–1.02)
CREAT SERPL-MCNC: 1.18 MG/DL (ref 0.55–1.02)
CREAT SERPL-MCNC: 1.19 MG/DL (ref 0.55–1.02)
CREAT SERPL-MCNC: 1.2 MG/DL (ref 0.55–1.02)
CREAT SERPL-MCNC: 1.21 MG/DL (ref 0.55–1.02)
CREAT SERPL-MCNC: 1.22 MG/DL (ref 0.55–1.02)
CREAT SERPL-MCNC: 1.25 MG/DL (ref 0.55–1.02)
CREAT SERPL-MCNC: 1.25 MG/DL (ref 0.55–1.02)
CREAT SERPL-MCNC: 1.26 MG/DL (ref 0.55–1.02)
CREAT SERPL-MCNC: 1.26 MG/DL (ref 0.55–1.02)
CREAT SERPL-MCNC: 1.27 MG/DL (ref 0.55–1.02)
CREAT SERPL-MCNC: 1.33 MG/DL (ref 0.55–1.02)
CREAT SERPL-MCNC: 1.34 MG/DL (ref 0.55–1.02)
CREAT SERPL-MCNC: 1.37 MG/DL (ref 0.55–1.02)
CREAT SERPL-MCNC: 1.38 MG/DL (ref 0.55–1.02)
CREAT SERPL-MCNC: 1.38 MG/DL (ref 0.55–1.02)
CREAT SERPL-MCNC: 1.4 MG/DL (ref 0.55–1.02)
CREAT SERPL-MCNC: 1.42 MG/DL (ref 0.55–1.02)
CROSSMATCH RESULT,%XM: NORMAL
CRP SERPL-MCNC: 5.04 MG/DL (ref 0–0.6)
D DIMER PPP FEU-MCNC: 1.77 MG/L FEU (ref 0–0.65)
DIAGNOSIS, 93000: NORMAL
DIFFERENTIAL METHOD BLD: ABNORMAL
DIGOXIN SERPL-MCNC: 1.5 NG/ML (ref 0.9–2)
ECHO LA AREA 4C: 16.64 CM2
ECHO LA MAJOR AXIS: 4.18 CM
ECHO LA MINOR AXIS: 1.89 CM
ECHO LA VOL 2C: 43.32 ML (ref 22–52)
ECHO LA VOL 4C: 39.32 ML (ref 22–52)
ECHO LA VOL BP: 46.11 ML (ref 22–52)
ECHO LA VOL/BSA BIPLANE: 20.86 ML/M2 (ref 16–28)
ECHO LA VOLUME INDEX A2C: 19.6 ML/M2 (ref 16–28)
ECHO LA VOLUME INDEX A4C: 17.79 ML/M2 (ref 16–28)
ECHO LV E' LATERAL VELOCITY: 12 CENTIMETER/SECOND
ECHO LV E' SEPTAL VELOCITY: 10.75 CENTIMETER/SECOND
ECHO LV INTERNAL DIMENSION DIASTOLIC: 5.01 CM (ref 3.9–5.3)
ECHO LV INTERNAL DIMENSION SYSTOLIC: 3.08 CM
ECHO LV IVSD: 0.88 CM (ref 0.6–0.9)
ECHO LV MASS 2D: 159.9 G (ref 67–162)
ECHO LV MASS INDEX 2D: 72.3 G/M2 (ref 43–95)
ECHO LV POSTERIOR WALL DIASTOLIC: 0.93 CM (ref 0.6–0.9)
ECHO MV A VELOCITY: 105.07 CENTIMETER/SECOND
ECHO MV AREA PHT: 4.55 CM2
ECHO MV E DECELERATION TIME (DT): 166.59 MS
ECHO MV E VELOCITY: 151.52 CENTIMETER/SECOND
ECHO MV PRESSURE HALF TIME (PHT): 48.31 MS
ECHO PV MAX VELOCITY: 146.76 CM/S
ECHO PV PEAK INSTANTANEOUS GRADIENT SYSTOLIC: 8.62 MMHG
ECHO RV TAPSE: 1.8 CM (ref 1.5–2)
ECHO TV REGURGITANT MAX VELOCITY: 322.08 CM/S
ECHO TV REGURGITANT PEAK GRADIENT: 41.5 MMHG
ELASTASE PANC STL-MCNT: >500 UG ELAST./G
EOSINOPHIL # BLD: 0 K/UL (ref 0–0.4)
EOSINOPHIL # BLD: 0.1 K/UL (ref 0–0.4)
EOSINOPHIL # BLD: 0.1 K/UL (ref 0–0.4)
EOSINOPHIL # BLD: 0.2 K/UL (ref 0–0.4)
EOSINOPHIL # BLD: 0.3 K/UL (ref 0–0.4)
EOSINOPHIL # BLD: 0.4 K/UL (ref 0–0.4)
EOSINOPHIL # BLD: 0.5 K/UL (ref 0–0.4)
EOSINOPHIL # BLD: 0.6 K/UL (ref 0–0.4)
EOSINOPHIL # BLD: 0.7 K/UL (ref 0–0.4)
EOSINOPHIL NFR BLD: 0 % (ref 0–7)
EOSINOPHIL NFR BLD: 1 % (ref 0–7)
EOSINOPHIL NFR BLD: 2 % (ref 0–7)
EOSINOPHIL NFR BLD: 3 % (ref 0–7)
EOSINOPHIL NFR BLD: 4 % (ref 0–7)
EOSINOPHIL NFR BLD: 4 % (ref 0–7)
EOSINOPHIL NFR BLD: 5 % (ref 0–7)
EPITH CASTS URNS QL MICRO: ABNORMAL /LPF
EPITH CASTS URNS QL MICRO: ABNORMAL /LPF
ERYTHROCYTE [DISTWIDTH] IN BLOOD BY AUTOMATED COUNT: 15.9 % (ref 11.5–14.5)
ERYTHROCYTE [DISTWIDTH] IN BLOOD BY AUTOMATED COUNT: 15.9 % (ref 11.5–14.5)
ERYTHROCYTE [DISTWIDTH] IN BLOOD BY AUTOMATED COUNT: 16 % (ref 11.5–14.5)
ERYTHROCYTE [DISTWIDTH] IN BLOOD BY AUTOMATED COUNT: 16.1 % (ref 11.5–14.5)
ERYTHROCYTE [DISTWIDTH] IN BLOOD BY AUTOMATED COUNT: 16.1 % (ref 11.5–14.5)
ERYTHROCYTE [DISTWIDTH] IN BLOOD BY AUTOMATED COUNT: 16.3 % (ref 11.5–14.5)
ERYTHROCYTE [DISTWIDTH] IN BLOOD BY AUTOMATED COUNT: 16.3 % (ref 11.5–14.5)
ERYTHROCYTE [DISTWIDTH] IN BLOOD BY AUTOMATED COUNT: 16.4 % (ref 11.5–14.5)
ERYTHROCYTE [DISTWIDTH] IN BLOOD BY AUTOMATED COUNT: 16.4 % (ref 11.5–14.5)
ERYTHROCYTE [DISTWIDTH] IN BLOOD BY AUTOMATED COUNT: 16.5 % (ref 11.5–14.5)
ERYTHROCYTE [DISTWIDTH] IN BLOOD BY AUTOMATED COUNT: 16.5 % (ref 11.5–14.5)
ERYTHROCYTE [DISTWIDTH] IN BLOOD BY AUTOMATED COUNT: 16.7 % (ref 11.5–14.5)
ERYTHROCYTE [DISTWIDTH] IN BLOOD BY AUTOMATED COUNT: 16.8 % (ref 11.5–14.5)
ERYTHROCYTE [DISTWIDTH] IN BLOOD BY AUTOMATED COUNT: 16.9 % (ref 11.5–14.5)
ERYTHROCYTE [DISTWIDTH] IN BLOOD BY AUTOMATED COUNT: 17.2 % (ref 11.5–14.5)
ERYTHROCYTE [DISTWIDTH] IN BLOOD BY AUTOMATED COUNT: 17.2 % (ref 11.5–14.5)
ERYTHROCYTE [DISTWIDTH] IN BLOOD BY AUTOMATED COUNT: 17.9 % (ref 11.5–14.5)
ERYTHROCYTE [DISTWIDTH] IN BLOOD BY AUTOMATED COUNT: 18 % (ref 11.5–14.5)
ERYTHROCYTE [DISTWIDTH] IN BLOOD BY AUTOMATED COUNT: 18.1 % (ref 11.5–14.5)
ERYTHROCYTE [DISTWIDTH] IN BLOOD BY AUTOMATED COUNT: 18.3 % (ref 11.5–14.5)
ERYTHROCYTE [DISTWIDTH] IN BLOOD BY AUTOMATED COUNT: 18.4 % (ref 11.5–14.5)
ERYTHROCYTE [DISTWIDTH] IN BLOOD BY AUTOMATED COUNT: 18.5 % (ref 11.5–14.5)
ERYTHROCYTE [DISTWIDTH] IN BLOOD BY AUTOMATED COUNT: 19.3 % (ref 11.5–14.5)
ERYTHROCYTE [DISTWIDTH] IN BLOOD BY AUTOMATED COUNT: 19.3 % (ref 11.5–14.5)
ERYTHROCYTE [DISTWIDTH] IN BLOOD BY AUTOMATED COUNT: 19.7 % (ref 11.5–14.5)
ERYTHROCYTE [DISTWIDTH] IN BLOOD BY AUTOMATED COUNT: 20.5 % (ref 11.5–14.5)
ERYTHROCYTE [DISTWIDTH] IN BLOOD BY AUTOMATED COUNT: 21.1 % (ref 11.5–14.5)
ERYTHROCYTE [DISTWIDTH] IN BLOOD BY AUTOMATED COUNT: 21.6 % (ref 11.5–14.5)
ERYTHROCYTE [DISTWIDTH] IN BLOOD BY AUTOMATED COUNT: 21.7 % (ref 11.5–14.5)
ERYTHROCYTE [DISTWIDTH] IN BLOOD BY AUTOMATED COUNT: 21.8 % (ref 11.5–14.5)
ERYTHROCYTE [DISTWIDTH] IN BLOOD BY AUTOMATED COUNT: 21.9 % (ref 11.5–14.5)
ERYTHROCYTE [DISTWIDTH] IN BLOOD BY AUTOMATED COUNT: 22 % (ref 11.5–14.5)
ERYTHROCYTE [DISTWIDTH] IN BLOOD BY AUTOMATED COUNT: 22 % (ref 11.5–14.5)
ERYTHROCYTE [DISTWIDTH] IN BLOOD BY AUTOMATED COUNT: 22.1 % (ref 11.5–14.5)
ERYTHROCYTE [DISTWIDTH] IN BLOOD BY AUTOMATED COUNT: 22.2 % (ref 11.5–14.5)
EST. AVERAGE GLUCOSE BLD GHB EST-MCNC: 111 MG/DL
EST. AVERAGE GLUCOSE BLD GHB EST-MCNC: 123 MG/DL
FAT STL QL: NORMAL
FERRITIN SERPL-MCNC: 105 NG/ML (ref 8–252)
FERRITIN SERPL-MCNC: 69 NG/ML (ref 26–388)
FIBRINOGEN PPP-MCNC: 425 MG/DL (ref 200–475)
FLUID CULTURE, SPNG2: NORMAL
FLUID CULTURE, SPNG2: NORMAL
FOLATE SERPL-MCNC: 13.6 NG/ML (ref 5–21)
GAMMA GLOB SERPL ELPH-MCNC: 0.5 G/DL (ref 0.4–1.8)
GLOBULIN SER CALC-MCNC: 2.4 G/DL (ref 2–4)
GLOBULIN SER CALC-MCNC: 2.5 G/DL (ref 2–4)
GLOBULIN SER CALC-MCNC: 2.5 G/DL (ref 2–4)
GLOBULIN SER CALC-MCNC: 2.6 G/DL (ref 2–4)
GLOBULIN SER CALC-MCNC: 2.7 G/DL (ref 2–4)
GLOBULIN SER CALC-MCNC: 2.8 G/DL (ref 2–4)
GLOBULIN SER CALC-MCNC: 2.8 G/DL (ref 2–4)
GLOBULIN SER CALC-MCNC: 2.9 G/DL (ref 2–4)
GLOBULIN SER CALC-MCNC: 3 G/DL (ref 2–4)
GLOBULIN SER CALC-MCNC: 3.1 G/DL (ref 2–4)
GLOBULIN SER CALC-MCNC: 3.2 G/DL (ref 2–4)
GLOBULIN SER CALC-MCNC: 3.2 G/DL (ref 2–4)
GLOBULIN SER CALC-MCNC: 3.3 G/DL (ref 2–4)
GLOBULIN SER CALC-MCNC: 3.4 G/DL (ref 2–4)
GLOBULIN SER CALC-MCNC: 3.5 G/DL (ref 2–4)
GLOBULIN SER CALC-MCNC: 3.6 G/DL (ref 2–4)
GLOBULIN SER CALC-MCNC: 3.7 G/DL (ref 2–4)
GLOBULIN SER-MCNC: 2 G/DL (ref 2.2–3.9)
GLUCOSE BLD STRIP.AUTO-MCNC: 100 MG/DL (ref 65–100)
GLUCOSE BLD STRIP.AUTO-MCNC: 101 MG/DL (ref 65–100)
GLUCOSE BLD STRIP.AUTO-MCNC: 102 MG/DL (ref 65–100)
GLUCOSE BLD STRIP.AUTO-MCNC: 103 MG/DL (ref 65–100)
GLUCOSE BLD STRIP.AUTO-MCNC: 104 MG/DL (ref 65–100)
GLUCOSE BLD STRIP.AUTO-MCNC: 104 MG/DL (ref 65–100)
GLUCOSE BLD STRIP.AUTO-MCNC: 107 MG/DL (ref 65–100)
GLUCOSE BLD STRIP.AUTO-MCNC: 108 MG/DL (ref 65–100)
GLUCOSE BLD STRIP.AUTO-MCNC: 109 MG/DL (ref 65–100)
GLUCOSE BLD STRIP.AUTO-MCNC: 110 MG/DL (ref 65–100)
GLUCOSE BLD STRIP.AUTO-MCNC: 111 MG/DL (ref 65–100)
GLUCOSE BLD STRIP.AUTO-MCNC: 111 MG/DL (ref 65–100)
GLUCOSE BLD STRIP.AUTO-MCNC: 113 MG/DL (ref 65–100)
GLUCOSE BLD STRIP.AUTO-MCNC: 114 MG/DL (ref 65–100)
GLUCOSE BLD STRIP.AUTO-MCNC: 115 MG/DL (ref 65–100)
GLUCOSE BLD STRIP.AUTO-MCNC: 116 MG/DL (ref 65–100)
GLUCOSE BLD STRIP.AUTO-MCNC: 117 MG/DL (ref 65–100)
GLUCOSE BLD STRIP.AUTO-MCNC: 120 MG/DL (ref 65–100)
GLUCOSE BLD STRIP.AUTO-MCNC: 120 MG/DL (ref 65–100)
GLUCOSE BLD STRIP.AUTO-MCNC: 121 MG/DL (ref 65–100)
GLUCOSE BLD STRIP.AUTO-MCNC: 122 MG/DL (ref 65–100)
GLUCOSE BLD STRIP.AUTO-MCNC: 123 MG/DL (ref 65–100)
GLUCOSE BLD STRIP.AUTO-MCNC: 127 MG/DL (ref 65–100)
GLUCOSE BLD STRIP.AUTO-MCNC: 129 MG/DL (ref 65–100)
GLUCOSE BLD STRIP.AUTO-MCNC: 129 MG/DL (ref 65–100)
GLUCOSE BLD STRIP.AUTO-MCNC: 130 MG/DL (ref 65–100)
GLUCOSE BLD STRIP.AUTO-MCNC: 131 MG/DL (ref 65–100)
GLUCOSE BLD STRIP.AUTO-MCNC: 131 MG/DL (ref 65–100)
GLUCOSE BLD STRIP.AUTO-MCNC: 132 MG/DL (ref 65–100)
GLUCOSE BLD STRIP.AUTO-MCNC: 133 MG/DL (ref 65–100)
GLUCOSE BLD STRIP.AUTO-MCNC: 135 MG/DL (ref 65–100)
GLUCOSE BLD STRIP.AUTO-MCNC: 136 MG/DL (ref 65–100)
GLUCOSE BLD STRIP.AUTO-MCNC: 138 MG/DL (ref 65–100)
GLUCOSE BLD STRIP.AUTO-MCNC: 138 MG/DL (ref 65–100)
GLUCOSE BLD STRIP.AUTO-MCNC: 144 MG/DL (ref 65–100)
GLUCOSE BLD STRIP.AUTO-MCNC: 145 MG/DL (ref 65–100)
GLUCOSE BLD STRIP.AUTO-MCNC: 145 MG/DL (ref 65–100)
GLUCOSE BLD STRIP.AUTO-MCNC: 146 MG/DL (ref 65–100)
GLUCOSE BLD STRIP.AUTO-MCNC: 146 MG/DL (ref 65–100)
GLUCOSE BLD STRIP.AUTO-MCNC: 147 MG/DL (ref 65–100)
GLUCOSE BLD STRIP.AUTO-MCNC: 152 MG/DL (ref 65–100)
GLUCOSE BLD STRIP.AUTO-MCNC: 152 MG/DL (ref 65–100)
GLUCOSE BLD STRIP.AUTO-MCNC: 153 MG/DL (ref 65–100)
GLUCOSE BLD STRIP.AUTO-MCNC: 153 MG/DL (ref 65–100)
GLUCOSE BLD STRIP.AUTO-MCNC: 154 MG/DL (ref 65–100)
GLUCOSE BLD STRIP.AUTO-MCNC: 155 MG/DL (ref 65–100)
GLUCOSE BLD STRIP.AUTO-MCNC: 156 MG/DL (ref 65–100)
GLUCOSE BLD STRIP.AUTO-MCNC: 157 MG/DL (ref 65–100)
GLUCOSE BLD STRIP.AUTO-MCNC: 161 MG/DL (ref 65–100)
GLUCOSE BLD STRIP.AUTO-MCNC: 164 MG/DL (ref 65–100)
GLUCOSE BLD STRIP.AUTO-MCNC: 166 MG/DL (ref 65–100)
GLUCOSE BLD STRIP.AUTO-MCNC: 167 MG/DL (ref 65–100)
GLUCOSE BLD STRIP.AUTO-MCNC: 169 MG/DL (ref 65–100)
GLUCOSE BLD STRIP.AUTO-MCNC: 173 MG/DL (ref 65–100)
GLUCOSE BLD STRIP.AUTO-MCNC: 174 MG/DL (ref 65–100)
GLUCOSE BLD STRIP.AUTO-MCNC: 175 MG/DL (ref 65–100)
GLUCOSE BLD STRIP.AUTO-MCNC: 175 MG/DL (ref 65–100)
GLUCOSE BLD STRIP.AUTO-MCNC: 176 MG/DL (ref 65–100)
GLUCOSE BLD STRIP.AUTO-MCNC: 179 MG/DL (ref 65–100)
GLUCOSE BLD STRIP.AUTO-MCNC: 181 MG/DL (ref 65–100)
GLUCOSE BLD STRIP.AUTO-MCNC: 181 MG/DL (ref 65–100)
GLUCOSE BLD STRIP.AUTO-MCNC: 182 MG/DL (ref 65–100)
GLUCOSE BLD STRIP.AUTO-MCNC: 184 MG/DL (ref 65–100)
GLUCOSE BLD STRIP.AUTO-MCNC: 186 MG/DL (ref 65–100)
GLUCOSE BLD STRIP.AUTO-MCNC: 187 MG/DL (ref 65–100)
GLUCOSE BLD STRIP.AUTO-MCNC: 187 MG/DL (ref 65–100)
GLUCOSE BLD STRIP.AUTO-MCNC: 190 MG/DL (ref 65–100)
GLUCOSE BLD STRIP.AUTO-MCNC: 196 MG/DL (ref 65–100)
GLUCOSE BLD STRIP.AUTO-MCNC: 197 MG/DL (ref 65–100)
GLUCOSE BLD STRIP.AUTO-MCNC: 200 MG/DL (ref 65–100)
GLUCOSE BLD STRIP.AUTO-MCNC: 203 MG/DL (ref 65–100)
GLUCOSE BLD STRIP.AUTO-MCNC: 209 MG/DL (ref 65–100)
GLUCOSE BLD STRIP.AUTO-MCNC: 209 MG/DL (ref 65–100)
GLUCOSE BLD STRIP.AUTO-MCNC: 211 MG/DL (ref 65–100)
GLUCOSE BLD STRIP.AUTO-MCNC: 213 MG/DL (ref 65–100)
GLUCOSE BLD STRIP.AUTO-MCNC: 214 MG/DL (ref 65–100)
GLUCOSE BLD STRIP.AUTO-MCNC: 218 MG/DL (ref 65–100)
GLUCOSE BLD STRIP.AUTO-MCNC: 219 MG/DL (ref 65–100)
GLUCOSE BLD STRIP.AUTO-MCNC: 224 MG/DL (ref 65–100)
GLUCOSE BLD STRIP.AUTO-MCNC: 226 MG/DL (ref 65–100)
GLUCOSE BLD STRIP.AUTO-MCNC: 230 MG/DL (ref 65–100)
GLUCOSE BLD STRIP.AUTO-MCNC: 233 MG/DL (ref 65–100)
GLUCOSE BLD STRIP.AUTO-MCNC: 234 MG/DL (ref 65–100)
GLUCOSE BLD STRIP.AUTO-MCNC: 235 MG/DL (ref 65–100)
GLUCOSE BLD STRIP.AUTO-MCNC: 244 MG/DL (ref 65–100)
GLUCOSE BLD STRIP.AUTO-MCNC: 246 MG/DL (ref 65–100)
GLUCOSE BLD STRIP.AUTO-MCNC: 251 MG/DL (ref 65–100)
GLUCOSE BLD STRIP.AUTO-MCNC: 251 MG/DL (ref 65–100)
GLUCOSE BLD STRIP.AUTO-MCNC: 263 MG/DL (ref 65–100)
GLUCOSE BLD STRIP.AUTO-MCNC: 263 MG/DL (ref 65–100)
GLUCOSE BLD STRIP.AUTO-MCNC: 271 MG/DL (ref 65–100)
GLUCOSE BLD STRIP.AUTO-MCNC: 279 MG/DL (ref 65–100)
GLUCOSE BLD STRIP.AUTO-MCNC: 283 MG/DL (ref 65–100)
GLUCOSE BLD STRIP.AUTO-MCNC: 287 MG/DL (ref 65–100)
GLUCOSE BLD STRIP.AUTO-MCNC: 292 MG/DL (ref 65–100)
GLUCOSE BLD STRIP.AUTO-MCNC: 294 MG/DL (ref 65–100)
GLUCOSE BLD STRIP.AUTO-MCNC: 296 MG/DL (ref 65–100)
GLUCOSE BLD STRIP.AUTO-MCNC: 312 MG/DL (ref 65–100)
GLUCOSE BLD STRIP.AUTO-MCNC: 331 MG/DL (ref 65–100)
GLUCOSE BLD STRIP.AUTO-MCNC: 336 MG/DL (ref 65–100)
GLUCOSE BLD STRIP.AUTO-MCNC: 338 MG/DL (ref 65–100)
GLUCOSE BLD STRIP.AUTO-MCNC: 354 MG/DL (ref 65–100)
GLUCOSE BLD STRIP.AUTO-MCNC: 365 MG/DL (ref 65–100)
GLUCOSE BLD STRIP.AUTO-MCNC: 39 MG/DL (ref 65–100)
GLUCOSE BLD STRIP.AUTO-MCNC: 47 MG/DL (ref 65–100)
GLUCOSE BLD STRIP.AUTO-MCNC: 52 MG/DL (ref 65–100)
GLUCOSE BLD STRIP.AUTO-MCNC: 53 MG/DL (ref 65–100)
GLUCOSE BLD STRIP.AUTO-MCNC: 55 MG/DL (ref 65–100)
GLUCOSE BLD STRIP.AUTO-MCNC: 55 MG/DL (ref 65–100)
GLUCOSE BLD STRIP.AUTO-MCNC: 56 MG/DL (ref 65–100)
GLUCOSE BLD STRIP.AUTO-MCNC: 57 MG/DL (ref 65–100)
GLUCOSE BLD STRIP.AUTO-MCNC: 58 MG/DL (ref 65–100)
GLUCOSE BLD STRIP.AUTO-MCNC: 64 MG/DL (ref 65–100)
GLUCOSE BLD STRIP.AUTO-MCNC: 65 MG/DL (ref 65–100)
GLUCOSE BLD STRIP.AUTO-MCNC: 65 MG/DL (ref 65–100)
GLUCOSE BLD STRIP.AUTO-MCNC: 66 MG/DL (ref 65–100)
GLUCOSE BLD STRIP.AUTO-MCNC: 69 MG/DL (ref 65–100)
GLUCOSE BLD STRIP.AUTO-MCNC: 70 MG/DL (ref 65–100)
GLUCOSE BLD STRIP.AUTO-MCNC: 72 MG/DL (ref 65–100)
GLUCOSE BLD STRIP.AUTO-MCNC: 76 MG/DL (ref 65–100)
GLUCOSE BLD STRIP.AUTO-MCNC: 78 MG/DL (ref 65–100)
GLUCOSE BLD STRIP.AUTO-MCNC: 81 MG/DL (ref 65–100)
GLUCOSE BLD STRIP.AUTO-MCNC: 82 MG/DL (ref 65–100)
GLUCOSE BLD STRIP.AUTO-MCNC: 82 MG/DL (ref 65–100)
GLUCOSE BLD STRIP.AUTO-MCNC: 83 MG/DL (ref 65–100)
GLUCOSE BLD STRIP.AUTO-MCNC: 83 MG/DL (ref 65–100)
GLUCOSE BLD STRIP.AUTO-MCNC: 84 MG/DL (ref 65–100)
GLUCOSE BLD STRIP.AUTO-MCNC: 84 MG/DL (ref 65–100)
GLUCOSE BLD STRIP.AUTO-MCNC: 85 MG/DL (ref 65–100)
GLUCOSE BLD STRIP.AUTO-MCNC: 86 MG/DL (ref 65–100)
GLUCOSE BLD STRIP.AUTO-MCNC: 87 MG/DL (ref 65–100)
GLUCOSE BLD STRIP.AUTO-MCNC: 88 MG/DL (ref 65–100)
GLUCOSE BLD STRIP.AUTO-MCNC: 88 MG/DL (ref 65–100)
GLUCOSE BLD STRIP.AUTO-MCNC: 89 MG/DL (ref 65–100)
GLUCOSE BLD STRIP.AUTO-MCNC: 89 MG/DL (ref 65–100)
GLUCOSE BLD STRIP.AUTO-MCNC: 90 MG/DL (ref 65–100)
GLUCOSE BLD STRIP.AUTO-MCNC: 91 MG/DL (ref 65–100)
GLUCOSE BLD STRIP.AUTO-MCNC: 92 MG/DL (ref 65–100)
GLUCOSE BLD STRIP.AUTO-MCNC: 93 MG/DL (ref 65–100)
GLUCOSE BLD STRIP.AUTO-MCNC: 93 MG/DL (ref 65–100)
GLUCOSE BLD STRIP.AUTO-MCNC: 94 MG/DL (ref 65–100)
GLUCOSE BLD STRIP.AUTO-MCNC: 95 MG/DL (ref 65–100)
GLUCOSE BLD STRIP.AUTO-MCNC: 95 MG/DL (ref 65–100)
GLUCOSE BLD STRIP.AUTO-MCNC: 96 MG/DL (ref 65–100)
GLUCOSE BLD STRIP.AUTO-MCNC: 96 MG/DL (ref 65–100)
GLUCOSE BLD STRIP.AUTO-MCNC: 98 MG/DL (ref 65–100)
GLUCOSE BLD STRIP.AUTO-MCNC: 99 MG/DL (ref 65–100)
GLUCOSE BLD STRIP.AUTO-MCNC: NORMAL MG/DL (ref 65–100)
GLUCOSE SERPL-MCNC: 110 MG/DL (ref 65–100)
GLUCOSE SERPL-MCNC: 110 MG/DL (ref 65–100)
GLUCOSE SERPL-MCNC: 111 MG/DL (ref 65–100)
GLUCOSE SERPL-MCNC: 117 MG/DL (ref 65–100)
GLUCOSE SERPL-MCNC: 122 MG/DL (ref 65–100)
GLUCOSE SERPL-MCNC: 125 MG/DL (ref 65–100)
GLUCOSE SERPL-MCNC: 126 MG/DL (ref 65–100)
GLUCOSE SERPL-MCNC: 129 MG/DL (ref 65–100)
GLUCOSE SERPL-MCNC: 129 MG/DL (ref 65–100)
GLUCOSE SERPL-MCNC: 130 MG/DL (ref 65–100)
GLUCOSE SERPL-MCNC: 133 MG/DL (ref 65–100)
GLUCOSE SERPL-MCNC: 137 MG/DL (ref 65–100)
GLUCOSE SERPL-MCNC: 140 MG/DL (ref 65–100)
GLUCOSE SERPL-MCNC: 140 MG/DL (ref 65–100)
GLUCOSE SERPL-MCNC: 145 MG/DL (ref 65–100)
GLUCOSE SERPL-MCNC: 150 MG/DL (ref 65–100)
GLUCOSE SERPL-MCNC: 155 MG/DL (ref 65–100)
GLUCOSE SERPL-MCNC: 160 MG/DL (ref 65–100)
GLUCOSE SERPL-MCNC: 163 MG/DL (ref 65–100)
GLUCOSE SERPL-MCNC: 163 MG/DL (ref 65–100)
GLUCOSE SERPL-MCNC: 181 MG/DL (ref 65–100)
GLUCOSE SERPL-MCNC: 184 MG/DL (ref 65–100)
GLUCOSE SERPL-MCNC: 187 MG/DL (ref 65–100)
GLUCOSE SERPL-MCNC: 209 MG/DL (ref 65–100)
GLUCOSE SERPL-MCNC: 226 MG/DL (ref 65–100)
GLUCOSE SERPL-MCNC: 262 MG/DL (ref 65–100)
GLUCOSE SERPL-MCNC: 267 MG/DL (ref 65–100)
GLUCOSE SERPL-MCNC: 321 MG/DL (ref 65–100)
GLUCOSE SERPL-MCNC: 79 MG/DL (ref 65–100)
GLUCOSE SERPL-MCNC: 79 MG/DL (ref 65–100)
GLUCOSE SERPL-MCNC: 81 MG/DL (ref 65–100)
GLUCOSE SERPL-MCNC: 82 MG/DL (ref 65–100)
GLUCOSE SERPL-MCNC: 85 MG/DL (ref 65–100)
GLUCOSE SERPL-MCNC: 86 MG/DL (ref 65–100)
GLUCOSE SERPL-MCNC: 90 MG/DL (ref 65–100)
GLUCOSE SERPL-MCNC: 93 MG/DL (ref 65–100)
GLUCOSE SERPL-MCNC: 94 MG/DL (ref 65–100)
GLUCOSE SERPL-MCNC: 98 MG/DL (ref 65–100)
GLUCOSE UR STRIP.AUTO-MCNC: NEGATIVE MG/DL
GLUCOSE UR STRIP.AUTO-MCNC: NEGATIVE MG/DL
H PYLORI FROM TISSUE: NEGATIVE
HAPTOGLOB SERPL-MCNC: 65 MG/DL (ref 30–200)
HBA1C MFR BLD: 5.5 % (ref 4–5.6)
HBA1C MFR BLD: 5.9 % (ref 4–5.6)
HCT VFR BLD AUTO: 17.7 % (ref 35–47)
HCT VFR BLD AUTO: 22.9 % (ref 35–47)
HCT VFR BLD AUTO: 23.6 % (ref 35–47)
HCT VFR BLD AUTO: 23.8 % (ref 35–47)
HCT VFR BLD AUTO: 24.5 % (ref 35–47)
HCT VFR BLD AUTO: 24.9 % (ref 35–47)
HCT VFR BLD AUTO: 25.3 % (ref 35–47)
HCT VFR BLD AUTO: 25.3 % (ref 35–47)
HCT VFR BLD AUTO: 25.4 % (ref 35–47)
HCT VFR BLD AUTO: 25.5 % (ref 35–47)
HCT VFR BLD AUTO: 25.5 % (ref 35–47)
HCT VFR BLD AUTO: 25.6 % (ref 35–47)
HCT VFR BLD AUTO: 25.7 % (ref 35–47)
HCT VFR BLD AUTO: 26 % (ref 35–47)
HCT VFR BLD AUTO: 26.2 % (ref 35–47)
HCT VFR BLD AUTO: 26.8 % (ref 35–47)
HCT VFR BLD AUTO: 27 % (ref 35–47)
HCT VFR BLD AUTO: 27.3 % (ref 35–47)
HCT VFR BLD AUTO: 27.4 % (ref 35–47)
HCT VFR BLD AUTO: 27.4 % (ref 35–47)
HCT VFR BLD AUTO: 27.5 % (ref 35–47)
HCT VFR BLD AUTO: 27.5 % (ref 35–47)
HCT VFR BLD AUTO: 27.6 % (ref 35–47)
HCT VFR BLD AUTO: 27.7 % (ref 35–47)
HCT VFR BLD AUTO: 27.8 % (ref 35–47)
HCT VFR BLD AUTO: 27.9 % (ref 35–47)
HCT VFR BLD AUTO: 27.9 % (ref 35–47)
HCT VFR BLD AUTO: 28 % (ref 35–47)
HCT VFR BLD AUTO: 28.1 % (ref 35–47)
HCT VFR BLD AUTO: 28.3 % (ref 35–47)
HCT VFR BLD AUTO: 28.5 % (ref 35–47)
HCT VFR BLD AUTO: 28.5 % (ref 35–47)
HCT VFR BLD AUTO: 28.7 % (ref 35–47)
HCT VFR BLD AUTO: 28.8 % (ref 35–47)
HCT VFR BLD AUTO: 28.9 % (ref 35–47)
HCT VFR BLD AUTO: 29 % (ref 35–47)
HCT VFR BLD AUTO: 29.1 % (ref 35–47)
HCT VFR BLD AUTO: 29.1 % (ref 35–47)
HCT VFR BLD AUTO: 29.4 % (ref 35–47)
HCT VFR BLD AUTO: 29.7 % (ref 35–47)
HCT VFR BLD AUTO: 30.6 % (ref 35–47)
HCT VFR BLD AUTO: 31.6 % (ref 35–47)
HEALTH STATUS, XMCV2T: NORMAL
HEMOCCULT STL QL: POSITIVE
HEMOCCULT STL QL: POSITIVE
HGB BLD-MCNC: 4.9 G/DL (ref 11.5–16)
HGB BLD-MCNC: 6.5 G/DL (ref 11.5–16)
HGB BLD-MCNC: 6.5 G/DL (ref 11.5–16)
HGB BLD-MCNC: 6.8 G/DL (ref 11.5–16)
HGB BLD-MCNC: 7 G/DL (ref 11.5–16)
HGB BLD-MCNC: 7.2 G/DL (ref 11.5–16)
HGB BLD-MCNC: 7.3 G/DL (ref 11.5–16)
HGB BLD-MCNC: 7.4 G/DL (ref 11.5–16)
HGB BLD-MCNC: 7.5 G/DL (ref 11.5–16)
HGB BLD-MCNC: 7.7 G/DL (ref 11.5–16)
HGB BLD-MCNC: 7.8 G/DL (ref 11.5–16)
HGB BLD-MCNC: 7.9 G/DL (ref 11.5–16)
HGB BLD-MCNC: 8 G/DL (ref 11.5–16)
HGB BLD-MCNC: 8.1 G/DL (ref 11.5–16)
HGB BLD-MCNC: 8.1 G/DL (ref 11.5–16)
HGB BLD-MCNC: 8.3 G/DL (ref 11.5–16)
HGB BLD-MCNC: 8.4 G/DL (ref 11.5–16)
HGB BLD-MCNC: 8.5 G/DL (ref 11.5–16)
HGB BLD-MCNC: 8.5 G/DL (ref 11.5–16)
HGB BLD-MCNC: 8.6 G/DL (ref 11.5–16)
HGB BLD-MCNC: 8.6 G/DL (ref 11.5–16)
HGB BLD-MCNC: 8.7 G/DL (ref 11.5–16)
HGB BLD-MCNC: 8.9 G/DL (ref 11.5–16)
HGB BLD-MCNC: 9 G/DL (ref 11.5–16)
HGB UR QL STRIP: ABNORMAL
HGB UR QL STRIP: NEGATIVE
HISTORY CHECKED?,CKHIST: NORMAL
HYALINE CASTS URNS QL MICRO: ABNORMAL /LPF (ref 0–5)
HYALINE CASTS URNS QL MICRO: ABNORMAL /LPF (ref 0–5)
IGA SERPL-MCNC: 264 MG/DL (ref 64–422)
IGG SERPL-MCNC: 496 MG/DL (ref 586–1602)
IGM SERPL-MCNC: 42 MG/DL (ref 26–217)
IMM GRANULOCYTES # BLD AUTO: 0 K/UL
IMM GRANULOCYTES # BLD AUTO: 0 K/UL (ref 0–0.04)
IMM GRANULOCYTES # BLD AUTO: 0.1 K/UL (ref 0–0.04)
IMM GRANULOCYTES # BLD AUTO: 0.2 K/UL (ref 0–0.04)
IMM GRANULOCYTES # BLD AUTO: 0.3 K/UL (ref 0–0.04)
IMM GRANULOCYTES # BLD AUTO: 0.4 K/UL (ref 0–0.04)
IMM GRANULOCYTES # BLD AUTO: 0.4 K/UL (ref 0–0.04)
IMM GRANULOCYTES NFR BLD AUTO: 0 %
IMM GRANULOCYTES NFR BLD AUTO: 0 % (ref 0–0.5)
IMM GRANULOCYTES NFR BLD AUTO: 1 % (ref 0–0.5)
IMM GRANULOCYTES NFR BLD AUTO: 2 % (ref 0–0.5)
INR PPP: 1 (ref 0.9–1.1)
INR PPP: 1.1 (ref 0.9–1.1)
INR PPP: 1.1 (ref 0.9–1.1)
INTERPRETATION SERPL IEP-IMP: ABNORMAL
IRON SATN MFR SERPL: 11 % (ref 20–50)
IRON SERPL-MCNC: 24 UG/DL (ref 35–150)
KAPPA LC FREE SER-MCNC: 43.8 MG/L (ref 3.3–19.4)
KAPPA LC FREE/LAMBDA FREE SER: 1.36 {RATIO} (ref 0.26–1.65)
KETONES UR QL STRIP.AUTO: NEGATIVE MG/DL
KETONES UR QL STRIP.AUTO: NEGATIVE MG/DL
KIT LOT NO., HCLOLOT: NORMAL
L PNEUMO1 AG UR QL IA: NEGATIVE
L PNEUMO1 AG UR QL IA: NEGATIVE
LA VOL DISK BP: 42.68 ML (ref 22–52)
LACTATE BLD-SCNC: 1.07 MMOL/L (ref 0.4–2)
LACTATE SERPL-SCNC: 1 MMOL/L (ref 0.4–2)
LAMBDA LC FREE SERPL-MCNC: 32.2 MG/L (ref 5.7–26.3)
LDH SERPL L TO P-CCNC: 191 U/L (ref 81–246)
LDH SERPL L TO P-CCNC: 248 U/L (ref 81–246)
LEFT 2ND DIGIT BP: 49 MMHG
LEFT ARM BP: 128 MMHG
LEFT PROX RADIAL A BP: 105 MMHG
LEFT WBI: 0.82
LEUKOCYTE ESTERASE UR QL STRIP.AUTO: ABNORMAL
LEUKOCYTE ESTERASE UR QL STRIP.AUTO: NEGATIVE
LYMPHOCYTES # BLD: 0.5 K/UL (ref 0.8–3.5)
LYMPHOCYTES # BLD: 0.6 K/UL (ref 0.8–3.5)
LYMPHOCYTES # BLD: 0.7 K/UL (ref 0.8–3.5)
LYMPHOCYTES # BLD: 0.8 K/UL (ref 0.8–3.5)
LYMPHOCYTES # BLD: 0.9 K/UL (ref 0.8–3.5)
LYMPHOCYTES # BLD: 1 K/UL (ref 0.8–3.5)
LYMPHOCYTES # BLD: 1.1 K/UL (ref 0.8–3.5)
LYMPHOCYTES # BLD: 1.2 K/UL (ref 0.8–3.5)
LYMPHOCYTES # BLD: 1.3 K/UL (ref 0.8–3.5)
LYMPHOCYTES # BLD: 1.4 K/UL (ref 0.8–3.5)
LYMPHOCYTES # BLD: 1.8 K/UL (ref 0.8–3.5)
LYMPHOCYTES # BLD: 1.8 K/UL (ref 0.8–3.5)
LYMPHOCYTES NFR BLD: 10 % (ref 12–49)
LYMPHOCYTES NFR BLD: 11 % (ref 12–49)
LYMPHOCYTES NFR BLD: 11 % (ref 12–49)
LYMPHOCYTES NFR BLD: 12 % (ref 12–49)
LYMPHOCYTES NFR BLD: 12 % (ref 12–49)
LYMPHOCYTES NFR BLD: 3 % (ref 12–49)
LYMPHOCYTES NFR BLD: 4 % (ref 12–49)
LYMPHOCYTES NFR BLD: 5 % (ref 12–49)
LYMPHOCYTES NFR BLD: 6 % (ref 12–49)
LYMPHOCYTES NFR BLD: 6 % (ref 12–49)
LYMPHOCYTES NFR BLD: 7 % (ref 12–49)
LYMPHOCYTES NFR BLD: 8 % (ref 12–49)
LYMPHOCYTES NFR BLD: 9 % (ref 12–49)
M PNEUMO IGG SER IA-ACNC: 1492 U/ML (ref 0–99)
M PNEUMO IGM SER IA-ACNC: <770 U/ML (ref 0–769)
M PROTEIN SERPL ELPH-MCNC: ABNORMAL G/DL
MAGNESIUM SERPL-MCNC: 2.1 MG/DL (ref 1.6–2.4)
MAGNESIUM SERPL-MCNC: 2.2 MG/DL (ref 1.6–2.4)
MAGNESIUM SERPL-MCNC: 2.2 MG/DL (ref 1.6–2.4)
MAGNESIUM SERPL-MCNC: 2.3 MG/DL (ref 1.6–2.4)
MAGNESIUM SERPL-MCNC: 2.3 MG/DL (ref 1.6–2.4)
MAGNESIUM SERPL-MCNC: 2.4 MG/DL (ref 1.6–2.4)
MAGNESIUM SERPL-MCNC: 2.4 MG/DL (ref 1.6–2.4)
MAGNESIUM SERPL-MCNC: 2.5 MG/DL (ref 1.6–2.4)
MCH RBC QN AUTO: 24.4 PG (ref 26–34)
MCH RBC QN AUTO: 25.4 PG (ref 26–34)
MCH RBC QN AUTO: 25.6 PG (ref 26–34)
MCH RBC QN AUTO: 26.5 PG (ref 26–34)
MCH RBC QN AUTO: 26.6 PG (ref 26–34)
MCH RBC QN AUTO: 26.8 PG (ref 26–34)
MCH RBC QN AUTO: 26.9 PG (ref 26–34)
MCH RBC QN AUTO: 27 PG (ref 26–34)
MCH RBC QN AUTO: 27.4 PG (ref 26–34)
MCH RBC QN AUTO: 27.4 PG (ref 26–34)
MCH RBC QN AUTO: 27.5 PG (ref 26–34)
MCH RBC QN AUTO: 27.6 PG (ref 26–34)
MCH RBC QN AUTO: 27.7 PG (ref 26–34)
MCH RBC QN AUTO: 27.7 PG (ref 26–34)
MCH RBC QN AUTO: 27.9 PG (ref 26–34)
MCH RBC QN AUTO: 27.9 PG (ref 26–34)
MCH RBC QN AUTO: 28 PG (ref 26–34)
MCH RBC QN AUTO: 28 PG (ref 26–34)
MCH RBC QN AUTO: 28.1 PG (ref 26–34)
MCH RBC QN AUTO: 28.2 PG (ref 26–34)
MCH RBC QN AUTO: 28.2 PG (ref 26–34)
MCH RBC QN AUTO: 28.3 PG (ref 26–34)
MCH RBC QN AUTO: 28.3 PG (ref 26–34)
MCH RBC QN AUTO: 28.5 PG (ref 26–34)
MCH RBC QN AUTO: 28.5 PG (ref 26–34)
MCH RBC QN AUTO: 28.6 PG (ref 26–34)
MCH RBC QN AUTO: 28.7 PG (ref 26–34)
MCH RBC QN AUTO: 28.7 PG (ref 26–34)
MCH RBC QN AUTO: 29 PG (ref 26–34)
MCH RBC QN AUTO: 29 PG (ref 26–34)
MCH RBC QN AUTO: 29.1 PG (ref 26–34)
MCH RBC QN AUTO: 29.2 PG (ref 26–34)
MCH RBC QN AUTO: 29.2 PG (ref 26–34)
MCHC RBC AUTO-ENTMCNC: 26.8 G/DL (ref 30–36.5)
MCHC RBC AUTO-ENTMCNC: 27.5 G/DL (ref 30–36.5)
MCHC RBC AUTO-ENTMCNC: 27.7 G/DL (ref 30–36.5)
MCHC RBC AUTO-ENTMCNC: 27.9 G/DL (ref 30–36.5)
MCHC RBC AUTO-ENTMCNC: 28.1 G/DL (ref 30–36.5)
MCHC RBC AUTO-ENTMCNC: 28.2 G/DL (ref 30–36.5)
MCHC RBC AUTO-ENTMCNC: 28.3 G/DL (ref 30–36.5)
MCHC RBC AUTO-ENTMCNC: 28.4 G/DL (ref 30–36.5)
MCHC RBC AUTO-ENTMCNC: 28.4 G/DL (ref 30–36.5)
MCHC RBC AUTO-ENTMCNC: 28.5 G/DL (ref 30–36.5)
MCHC RBC AUTO-ENTMCNC: 28.6 G/DL (ref 30–36.5)
MCHC RBC AUTO-ENTMCNC: 28.7 G/DL (ref 30–36.5)
MCHC RBC AUTO-ENTMCNC: 28.8 G/DL (ref 30–36.5)
MCHC RBC AUTO-ENTMCNC: 28.8 G/DL (ref 30–36.5)
MCHC RBC AUTO-ENTMCNC: 28.9 G/DL (ref 30–36.5)
MCHC RBC AUTO-ENTMCNC: 29 G/DL (ref 30–36.5)
MCHC RBC AUTO-ENTMCNC: 29.1 G/DL (ref 30–36.5)
MCHC RBC AUTO-ENTMCNC: 29.2 G/DL (ref 30–36.5)
MCHC RBC AUTO-ENTMCNC: 29.5 G/DL (ref 30–36.5)
MCHC RBC AUTO-ENTMCNC: 29.7 G/DL (ref 30–36.5)
MCHC RBC AUTO-ENTMCNC: 29.8 G/DL (ref 30–36.5)
MCHC RBC AUTO-ENTMCNC: 29.9 G/DL (ref 30–36.5)
MCHC RBC AUTO-ENTMCNC: 30 G/DL (ref 30–36.5)
MCHC RBC AUTO-ENTMCNC: 30 G/DL (ref 30–36.5)
MCHC RBC AUTO-ENTMCNC: 30.1 G/DL (ref 30–36.5)
MCHC RBC AUTO-ENTMCNC: 30.1 G/DL (ref 30–36.5)
MCHC RBC AUTO-ENTMCNC: 30.4 G/DL (ref 30–36.5)
MCV RBC AUTO: 100 FL (ref 80–99)
MCV RBC AUTO: 102.6 FL (ref 80–99)
MCV RBC AUTO: 84.7 FL (ref 80–99)
MCV RBC AUTO: 86 FL (ref 80–99)
MCV RBC AUTO: 88.1 FL (ref 80–99)
MCV RBC AUTO: 88.3 FL (ref 80–99)
MCV RBC AUTO: 92.7 FL (ref 80–99)
MCV RBC AUTO: 92.8 FL (ref 80–99)
MCV RBC AUTO: 94.2 FL (ref 80–99)
MCV RBC AUTO: 94.4 FL (ref 80–99)
MCV RBC AUTO: 94.7 FL (ref 80–99)
MCV RBC AUTO: 94.9 FL (ref 80–99)
MCV RBC AUTO: 95.1 FL (ref 80–99)
MCV RBC AUTO: 95.5 FL (ref 80–99)
MCV RBC AUTO: 95.8 FL (ref 80–99)
MCV RBC AUTO: 96 FL (ref 80–99)
MCV RBC AUTO: 96.1 FL (ref 80–99)
MCV RBC AUTO: 96.6 FL (ref 80–99)
MCV RBC AUTO: 96.7 FL (ref 80–99)
MCV RBC AUTO: 96.7 FL (ref 80–99)
MCV RBC AUTO: 96.8 FL (ref 80–99)
MCV RBC AUTO: 96.9 FL (ref 80–99)
MCV RBC AUTO: 97.2 FL (ref 80–99)
MCV RBC AUTO: 97.2 FL (ref 80–99)
MCV RBC AUTO: 97.9 FL (ref 80–99)
MCV RBC AUTO: 98.1 FL (ref 80–99)
MCV RBC AUTO: 98.2 FL (ref 80–99)
MCV RBC AUTO: 98.3 FL (ref 80–99)
MCV RBC AUTO: 98.7 FL (ref 80–99)
MCV RBC AUTO: 99 FL (ref 80–99)
MCV RBC AUTO: 99.2 FL (ref 80–99)
MCV RBC AUTO: 99.2 FL (ref 80–99)
MCV RBC AUTO: 99.6 FL (ref 80–99)
METAMYELOCYTES NFR BLD MANUAL: 1 %
MONOCYTES # BLD: 0.4 K/UL (ref 0–1)
MONOCYTES # BLD: 0.5 K/UL (ref 0–1)
MONOCYTES # BLD: 0.5 K/UL (ref 0–1)
MONOCYTES # BLD: 0.7 K/UL (ref 0–1)
MONOCYTES # BLD: 0.7 K/UL (ref 0–1)
MONOCYTES # BLD: 0.8 K/UL (ref 0–1)
MONOCYTES # BLD: 0.9 K/UL (ref 0–1)
MONOCYTES # BLD: 1 K/UL (ref 0–1)
MONOCYTES # BLD: 1.1 K/UL (ref 0–1)
MONOCYTES # BLD: 1.2 K/UL (ref 0–1)
MONOCYTES # BLD: 1.3 K/UL (ref 0–1)
MONOCYTES # BLD: 1.4 K/UL (ref 0–1)
MONOCYTES # BLD: 1.5 K/UL (ref 0–1)
MONOCYTES # BLD: 1.6 K/UL (ref 0–1)
MONOCYTES # BLD: 1.8 K/UL (ref 0–1)
MONOCYTES NFR BLD: 10 % (ref 5–13)
MONOCYTES NFR BLD: 11 % (ref 5–13)
MONOCYTES NFR BLD: 12 % (ref 5–13)
MONOCYTES NFR BLD: 12 % (ref 5–13)
MONOCYTES NFR BLD: 14 % (ref 5–13)
MONOCYTES NFR BLD: 2 % (ref 5–13)
MONOCYTES NFR BLD: 3 % (ref 5–13)
MONOCYTES NFR BLD: 4 % (ref 5–13)
MONOCYTES NFR BLD: 4 % (ref 5–13)
MONOCYTES NFR BLD: 5 % (ref 5–13)
MONOCYTES NFR BLD: 6 % (ref 5–13)
MONOCYTES NFR BLD: 7 % (ref 5–13)
MONOCYTES NFR BLD: 8 % (ref 5–13)
MONOCYTES NFR BLD: 9 % (ref 5–13)
MONOCYTES NFR BLD: 9 % (ref 5–13)
MYELOCYTES NFR BLD MANUAL: 1 %
MYELOCYTES NFR BLD MANUAL: 1 %
MYELOCYTES NFR BLD MANUAL: 2 %
NEGATIVE CONTROL: NEGATIVE
NEUTRAL FAT STL QL: NORMAL
NEUTS BAND NFR BLD MANUAL: 1 %
NEUTS BAND NFR BLD MANUAL: 1 % (ref 0–6)
NEUTS BAND NFR BLD MANUAL: 2 % (ref 0–6)
NEUTS BAND NFR BLD MANUAL: 8 % (ref 0–6)
NEUTS SEG # BLD: 10.3 K/UL (ref 1.8–8)
NEUTS SEG # BLD: 10.7 K/UL (ref 1.8–8)
NEUTS SEG # BLD: 10.8 K/UL (ref 1.8–8)
NEUTS SEG # BLD: 11.2 K/UL (ref 1.8–8)
NEUTS SEG # BLD: 11.3 K/UL (ref 1.8–8)
NEUTS SEG # BLD: 11.4 K/UL (ref 1.8–8)
NEUTS SEG # BLD: 11.5 K/UL (ref 1.8–8)
NEUTS SEG # BLD: 11.6 K/UL (ref 1.8–8)
NEUTS SEG # BLD: 11.9 K/UL (ref 1.8–8)
NEUTS SEG # BLD: 11.9 K/UL (ref 1.8–8)
NEUTS SEG # BLD: 12.1 K/UL (ref 1.8–8)
NEUTS SEG # BLD: 12.3 K/UL (ref 1.8–8)
NEUTS SEG # BLD: 12.4 K/UL (ref 1.8–8)
NEUTS SEG # BLD: 12.4 K/UL (ref 1.8–8)
NEUTS SEG # BLD: 12.5 K/UL (ref 1.8–8)
NEUTS SEG # BLD: 12.5 K/UL (ref 1.8–8)
NEUTS SEG # BLD: 12.6 K/UL (ref 1.8–8)
NEUTS SEG # BLD: 12.8 K/UL (ref 1.8–8)
NEUTS SEG # BLD: 12.9 K/UL (ref 1.8–8)
NEUTS SEG # BLD: 13 K/UL (ref 1.8–8)
NEUTS SEG # BLD: 13.5 K/UL (ref 1.8–8)
NEUTS SEG # BLD: 13.5 K/UL (ref 1.8–8)
NEUTS SEG # BLD: 13.7 K/UL (ref 1.8–8)
NEUTS SEG # BLD: 13.9 K/UL (ref 1.8–8)
NEUTS SEG # BLD: 14 K/UL (ref 1.8–8)
NEUTS SEG # BLD: 14.3 K/UL (ref 1.8–8)
NEUTS SEG # BLD: 14.6 K/UL (ref 1.8–8)
NEUTS SEG # BLD: 16.1 K/UL (ref 1.8–8)
NEUTS SEG # BLD: 16.2 K/UL (ref 1.8–8)
NEUTS SEG # BLD: 16.5 K/UL (ref 1.8–8)
NEUTS SEG # BLD: 6.5 K/UL (ref 1.8–8)
NEUTS SEG # BLD: 8.3 K/UL (ref 1.8–8)
NEUTS SEG # BLD: 8.7 K/UL (ref 1.8–8)
NEUTS SEG # BLD: 9 K/UL (ref 1.8–8)
NEUTS SEG # BLD: 9.5 K/UL (ref 1.8–8)
NEUTS SEG # BLD: 9.5 K/UL (ref 1.8–8)
NEUTS SEG NFR BLD: 64 % (ref 32–75)
NEUTS SEG NFR BLD: 70 % (ref 32–75)
NEUTS SEG NFR BLD: 72 % (ref 32–75)
NEUTS SEG NFR BLD: 72 % (ref 32–75)
NEUTS SEG NFR BLD: 74 % (ref 32–75)
NEUTS SEG NFR BLD: 76 % (ref 32–75)
NEUTS SEG NFR BLD: 77 % (ref 32–75)
NEUTS SEG NFR BLD: 78 % (ref 32–75)
NEUTS SEG NFR BLD: 79 % (ref 32–75)
NEUTS SEG NFR BLD: 80 % (ref 32–75)
NEUTS SEG NFR BLD: 81 % (ref 32–75)
NEUTS SEG NFR BLD: 82 % (ref 32–75)
NEUTS SEG NFR BLD: 83 % (ref 32–75)
NEUTS SEG NFR BLD: 84 % (ref 32–75)
NEUTS SEG NFR BLD: 86 % (ref 32–75)
NEUTS SEG NFR BLD: 87 % (ref 32–75)
NEUTS SEG NFR BLD: 87 % (ref 32–75)
NEUTS SEG NFR BLD: 88 % (ref 32–75)
NEUTS SEG NFR BLD: 88 % (ref 32–75)
NEUTS SEG NFR BLD: 89 % (ref 32–75)
NEUTS SEG NFR BLD: 90 % (ref 32–75)
NEUTS SEG NFR BLD: 91 % (ref 32–75)
NEUTS SEG NFR BLD: 92 % (ref 32–75)
NITRITE UR QL STRIP.AUTO: POSITIVE
NITRITE UR QL STRIP.AUTO: POSITIVE
NRBC # BLD: 0 K/UL (ref 0–0.01)
NRBC # BLD: 0.02 K/UL (ref 0–0.01)
NRBC # BLD: 0.03 K/UL (ref 0–0.01)
NRBC # BLD: 0.03 K/UL (ref 0–0.01)
NRBC # BLD: 0.04 K/UL (ref 0–0.01)
NRBC # BLD: 0.05 K/UL (ref 0–0.01)
NRBC # BLD: 0.06 K/UL (ref 0–0.01)
NRBC # BLD: 0.08 K/UL (ref 0–0.01)
NRBC # BLD: 0.16 K/UL (ref 0–0.01)
NRBC # BLD: 0.26 K/UL (ref 0–0.01)
NRBC # BLD: 0.28 K/UL (ref 0–0.01)
NRBC # BLD: 0.32 K/UL (ref 0–0.01)
NRBC BLD-RTO: 0 PER 100 WBC
NRBC BLD-RTO: 0.1 PER 100 WBC
NRBC BLD-RTO: 0.2 PER 100 WBC
NRBC BLD-RTO: 0.3 PER 100 WBC
NRBC BLD-RTO: 0.4 PER 100 WBC
NRBC BLD-RTO: 0.4 PER 100 WBC
NRBC BLD-RTO: 0.5 PER 100 WBC
NRBC BLD-RTO: 0.5 PER 100 WBC
NRBC BLD-RTO: 1.1 PER 100 WBC
NRBC BLD-RTO: 1.7 PER 100 WBC
NRBC BLD-RTO: 1.9 PER 100 WBC
NRBC BLD-RTO: 2.1 PER 100 WBC
ORGANISM ID, SPNG3: NORMAL
ORGANISM ID, SPNG3: NORMAL
P-R INTERVAL, ECG05: 168 MS
P-R INTERVAL, ECG05: 178 MS
PATH REV BLD -IMP: ABNORMAL
PH UR STRIP: 5.5 [PH] (ref 5–8)
PH UR STRIP: 5.5 [PH] (ref 5–8)
PHOSPHATE SERPL-MCNC: 2.2 MG/DL (ref 2.6–4.7)
PLATELET # BLD AUTO: 228 K/UL (ref 150–400)
PLATELET # BLD AUTO: 256 K/UL (ref 150–400)
PLATELET # BLD AUTO: 261 K/UL (ref 150–400)
PLATELET # BLD AUTO: 267 K/UL (ref 150–400)
PLATELET # BLD AUTO: 273 K/UL (ref 150–400)
PLATELET # BLD AUTO: 278 K/UL (ref 150–400)
PLATELET # BLD AUTO: 284 K/UL (ref 150–400)
PLATELET # BLD AUTO: 288 K/UL (ref 150–400)
PLATELET # BLD AUTO: 295 K/UL (ref 150–400)
PLATELET # BLD AUTO: 297 K/UL (ref 150–400)
PLATELET # BLD AUTO: 298 K/UL (ref 150–400)
PLATELET # BLD AUTO: 303 K/UL (ref 150–400)
PLATELET # BLD AUTO: 308 K/UL (ref 150–400)
PLATELET # BLD AUTO: 311 K/UL (ref 150–400)
PLATELET # BLD AUTO: 319 K/UL (ref 150–400)
PLATELET # BLD AUTO: 319 K/UL (ref 150–400)
PLATELET # BLD AUTO: 321 K/UL (ref 150–400)
PLATELET # BLD AUTO: 325 K/UL (ref 150–400)
PLATELET # BLD AUTO: 328 K/UL (ref 150–400)
PLATELET # BLD AUTO: 329 K/UL (ref 150–400)
PLATELET # BLD AUTO: 339 K/UL (ref 150–400)
PLATELET # BLD AUTO: 345 K/UL (ref 150–400)
PLATELET # BLD AUTO: 346 K/UL (ref 150–400)
PLATELET # BLD AUTO: 351 K/UL (ref 150–400)
PLATELET # BLD AUTO: 352 K/UL (ref 150–400)
PLATELET # BLD AUTO: 354 K/UL (ref 150–400)
PLATELET # BLD AUTO: 358 K/UL (ref 150–400)
PLATELET # BLD AUTO: 361 K/UL (ref 150–400)
PLATELET # BLD AUTO: 369 K/UL (ref 150–400)
PLATELET # BLD AUTO: 369 K/UL (ref 150–400)
PLATELET # BLD AUTO: 388 K/UL (ref 150–400)
PLATELET # BLD AUTO: 389 K/UL (ref 150–400)
PLATELET # BLD AUTO: 393 K/UL (ref 150–400)
PLATELET # BLD AUTO: 400 K/UL (ref 150–400)
PLATELET # BLD AUTO: 405 K/UL (ref 150–400)
PLATELET # BLD AUTO: 410 K/UL (ref 150–400)
PLATELET # BLD AUTO: 416 K/UL (ref 150–400)
PLATELET # BLD AUTO: 443 K/UL (ref 150–400)
PLATELET # BLD AUTO: 443 K/UL (ref 150–400)
PLATELET # BLD AUTO: 446 K/UL (ref 150–400)
PLATELET # BLD AUTO: 461 K/UL (ref 150–400)
PLATELET # BLD AUTO: 505 K/UL (ref 150–400)
PLATELET COMMENTS,PCOM: ABNORMAL
PLATELET COMMENTS,PCOM: ABNORMAL
PLEASE NOTE, SPNG4: NORMAL
PLEASE NOTE, SPNG4: NORMAL
PMV BLD AUTO: 10 FL (ref 8.9–12.9)
PMV BLD AUTO: 10.1 FL (ref 8.9–12.9)
PMV BLD AUTO: 10.2 FL (ref 8.9–12.9)
PMV BLD AUTO: 10.3 FL (ref 8.9–12.9)
PMV BLD AUTO: 10.4 FL (ref 8.9–12.9)
PMV BLD AUTO: 10.5 FL (ref 8.9–12.9)
PMV BLD AUTO: 10.6 FL (ref 8.9–12.9)
PMV BLD AUTO: 10.7 FL (ref 8.9–12.9)
PMV BLD AUTO: 10.8 FL (ref 8.9–12.9)
PMV BLD AUTO: 10.8 FL (ref 8.9–12.9)
PMV BLD AUTO: 8.9 FL (ref 8.9–12.9)
PMV BLD AUTO: 9.6 FL (ref 8.9–12.9)
PMV BLD AUTO: 9.6 FL (ref 8.9–12.9)
PMV BLD AUTO: 9.7 FL (ref 8.9–12.9)
PMV BLD AUTO: 9.8 FL (ref 8.9–12.9)
PMV BLD AUTO: 9.8 FL (ref 8.9–12.9)
PMV BLD AUTO: 9.9 FL (ref 8.9–12.9)
POSITIVE CONTROL: POSITIVE
POTASSIUM SERPL-SCNC: 3.2 MMOL/L (ref 3.5–5.1)
POTASSIUM SERPL-SCNC: 3.3 MMOL/L (ref 3.5–5.1)
POTASSIUM SERPL-SCNC: 3.4 MMOL/L (ref 3.5–5.1)
POTASSIUM SERPL-SCNC: 3.5 MMOL/L (ref 3.5–5.1)
POTASSIUM SERPL-SCNC: 3.6 MMOL/L (ref 3.5–5.1)
POTASSIUM SERPL-SCNC: 3.7 MMOL/L (ref 3.5–5.1)
POTASSIUM SERPL-SCNC: 3.8 MMOL/L (ref 3.5–5.1)
POTASSIUM SERPL-SCNC: 3.9 MMOL/L (ref 3.5–5.1)
POTASSIUM SERPL-SCNC: 4 MMOL/L (ref 3.5–5.1)
POTASSIUM SERPL-SCNC: 4.1 MMOL/L (ref 3.5–5.1)
POTASSIUM SERPL-SCNC: 4.2 MMOL/L (ref 3.5–5.1)
POTASSIUM SERPL-SCNC: 4.3 MMOL/L (ref 3.5–5.1)
POTASSIUM SERPL-SCNC: 4.5 MMOL/L (ref 3.5–5.1)
POTASSIUM SERPL-SCNC: 4.6 MMOL/L (ref 3.5–5.1)
POTASSIUM SERPL-SCNC: 4.7 MMOL/L (ref 3.5–5.1)
PROCALCITONIN SERPL-MCNC: 0.29 NG/ML
PROT 24H UR-MRATE: 225 MG/24HR
PROT SERPL-MCNC: 4.1 G/DL (ref 6.4–8.2)
PROT SERPL-MCNC: 4.3 G/DL (ref 6.4–8.2)
PROT SERPL-MCNC: 4.4 G/DL (ref 6–8.5)
PROT SERPL-MCNC: 4.6 G/DL (ref 6.4–8.2)
PROT SERPL-MCNC: 4.6 G/DL (ref 6.4–8.2)
PROT SERPL-MCNC: 4.7 G/DL (ref 6.4–8.2)
PROT SERPL-MCNC: 4.8 G/DL (ref 6.4–8.2)
PROT SERPL-MCNC: 4.8 G/DL (ref 6.4–8.2)
PROT SERPL-MCNC: 4.9 G/DL (ref 6.4–8.2)
PROT SERPL-MCNC: 5 G/DL (ref 6.4–8.2)
PROT SERPL-MCNC: 5 G/DL (ref 6.4–8.2)
PROT SERPL-MCNC: 5.1 G/DL (ref 6.4–8.2)
PROT SERPL-MCNC: 5.2 G/DL (ref 6.4–8.2)
PROT SERPL-MCNC: 5.2 G/DL (ref 6.4–8.2)
PROT SERPL-MCNC: 5.3 G/DL (ref 6.4–8.2)
PROT SERPL-MCNC: 5.4 G/DL (ref 6.4–8.2)
PROT SERPL-MCNC: 5.5 G/DL (ref 6.4–8.2)
PROT SERPL-MCNC: 5.6 G/DL (ref 6.4–8.2)
PROT SERPL-MCNC: 5.7 G/DL (ref 6.4–8.2)
PROT SERPL-MCNC: 5.7 G/DL (ref 6.4–8.2)
PROT SERPL-MCNC: 5.8 G/DL (ref 6.4–8.2)
PROT SERPL-MCNC: 5.9 G/DL (ref 6.4–8.2)
PROT SERPL-MCNC: 6.1 G/DL (ref 6.4–8.2)
PROT UR STRIP-MCNC: 30 MG/DL
PROT UR STRIP-MCNC: NEGATIVE MG/DL
PROTHROMBIN TIME: 10.8 SEC (ref 9–11.1)
PROTHROMBIN TIME: 10.9 SEC (ref 9–11.1)
PROTHROMBIN TIME: 11.1 SEC (ref 9–11.1)
Q-T INTERVAL, ECG07: 312 MS
Q-T INTERVAL, ECG07: 330 MS
Q-T INTERVAL, ECG07: 368 MS
Q-T INTERVAL, ECG07: 372 MS
QRS DURATION, ECG06: 102 MS
QRS DURATION, ECG06: 90 MS
QRS DURATION, ECG06: 92 MS
QRS DURATION, ECG06: 94 MS
QTC CALCULATION (BEZET), ECG08: 421 MS
QTC CALCULATION (BEZET), ECG08: 433 MS
QTC CALCULATION (BEZET), ECG08: 445 MS
QTC CALCULATION (BEZET), ECG08: 470 MS
RBC # BLD AUTO: 2.01 M/UL (ref 3.8–5.2)
RBC # BLD AUTO: 2.32 M/UL (ref 3.8–5.2)
RBC # BLD AUTO: 2.41 M/UL (ref 3.8–5.2)
RBC # BLD AUTO: 2.42 M/UL (ref 3.8–5.2)
RBC # BLD AUTO: 2.49 M/UL (ref 3.8–5.2)
RBC # BLD AUTO: 2.55 M/UL (ref 3.8–5.2)
RBC # BLD AUTO: 2.56 M/UL (ref 3.8–5.2)
RBC # BLD AUTO: 2.56 M/UL (ref 3.8–5.2)
RBC # BLD AUTO: 2.58 M/UL (ref 3.8–5.2)
RBC # BLD AUTO: 2.58 M/UL (ref 3.8–5.2)
RBC # BLD AUTO: 2.63 M/UL (ref 3.8–5.2)
RBC # BLD AUTO: 2.67 M/UL (ref 3.8–5.2)
RBC # BLD AUTO: 2.69 M/UL (ref 3.8–5.2)
RBC # BLD AUTO: 2.69 M/UL (ref 3.8–5.2)
RBC # BLD AUTO: 2.79 M/UL (ref 3.8–5.2)
RBC # BLD AUTO: 2.79 M/UL (ref 3.8–5.2)
RBC # BLD AUTO: 2.8 M/UL (ref 3.8–5.2)
RBC # BLD AUTO: 2.81 M/UL (ref 3.8–5.2)
RBC # BLD AUTO: 2.82 M/UL (ref 3.8–5.2)
RBC # BLD AUTO: 2.83 M/UL (ref 3.8–5.2)
RBC # BLD AUTO: 2.84 M/UL (ref 3.8–5.2)
RBC # BLD AUTO: 2.85 M/UL (ref 3.8–5.2)
RBC # BLD AUTO: 2.86 M/UL (ref 3.8–5.2)
RBC # BLD AUTO: 2.86 M/UL (ref 3.8–5.2)
RBC # BLD AUTO: 2.87 M/UL (ref 3.8–5.2)
RBC # BLD AUTO: 2.87 M/UL (ref 3.8–5.2)
RBC # BLD AUTO: 2.88 M/UL (ref 3.8–5.2)
RBC # BLD AUTO: 2.89 M/UL (ref 3.8–5.2)
RBC # BLD AUTO: 2.9 M/UL (ref 3.8–5.2)
RBC # BLD AUTO: 2.94 M/UL (ref 3.8–5.2)
RBC # BLD AUTO: 2.95 M/UL (ref 3.8–5.2)
RBC # BLD AUTO: 2.99 M/UL (ref 3.8–5.2)
RBC # BLD AUTO: 3 M/UL (ref 3.8–5.2)
RBC # BLD AUTO: 3.03 M/UL (ref 3.8–5.2)
RBC # BLD AUTO: 3.05 M/UL (ref 3.8–5.2)
RBC # BLD AUTO: 3.07 M/UL (ref 3.8–5.2)
RBC # BLD AUTO: 3.09 M/UL (ref 3.8–5.2)
RBC # BLD AUTO: 3.17 M/UL (ref 3.8–5.2)
RBC # BLD AUTO: 3.2 M/UL (ref 3.8–5.2)
RBC # BLD AUTO: 3.23 M/UL (ref 3.8–5.2)
RBC # BLD AUTO: 3.25 M/UL (ref 3.8–5.2)
RBC # BLD AUTO: 3.33 M/UL (ref 3.8–5.2)
RBC #/AREA URNS HPF: ABNORMAL /HPF (ref 0–5)
RBC #/AREA URNS HPF: ABNORMAL /HPF (ref 0–5)
RBC MORPH BLD: ABNORMAL
RETICS # AUTO: 0.16 M/UL (ref 0.02–0.08)
RETICS/RBC NFR AUTO: 5.4 % (ref 0.7–2.1)
RIGHT 2ND DIGIT BP: 38 MMHG
RIGHT ARM BP: 103 MMHG
RIGHT PROX RADIAL A BP: 35 MMHG
RIGHT PROX ULNAR A BP: 59 MMHG
RIGHT WBI: 0.46
S PNEUM AG SPEC QL LA: NEGATIVE
S PNEUM AG SPEC QL LA: NEGATIVE
SAMPLES BEING HELD,HOLD: NORMAL
SARS-COV-2, COV2: NORMAL
SARS-COV-2, COV2: NOT DETECTED
SARS-COV-2, XPLCVT: NOT DETECTED
SERVICE CMNT-IMP: ABNORMAL
SERVICE CMNT-IMP: NORMAL
SODIUM SERPL-SCNC: 135 MMOL/L (ref 136–145)
SODIUM SERPL-SCNC: 136 MMOL/L (ref 136–145)
SODIUM SERPL-SCNC: 137 MMOL/L (ref 136–145)
SODIUM SERPL-SCNC: 138 MMOL/L (ref 136–145)
SODIUM SERPL-SCNC: 139 MMOL/L (ref 136–145)
SODIUM SERPL-SCNC: 140 MMOL/L (ref 136–145)
SODIUM SERPL-SCNC: 141 MMOL/L (ref 136–145)
SODIUM SERPL-SCNC: 142 MMOL/L (ref 136–145)
SODIUM SERPL-SCNC: 142 MMOL/L (ref 136–145)
SODIUM SERPL-SCNC: 143 MMOL/L (ref 136–145)
SODIUM SERPL-SCNC: 144 MMOL/L (ref 136–145)
SOURCE, COVRS: NORMAL
SP GR UR REFRACTOMETRY: 1.01 (ref 1–1.03)
SP GR UR REFRACTOMETRY: 1.02 (ref 1–1.03)
SPECIMEN EXP DATE BLD: NORMAL
SPECIMEN SOURCE, FCOV2M: NORMAL
SPECIMEN SOURCE: NORMAL
SPECIMEN TYPE, XMCV1T: NORMAL
SPECIMEN VOL ?TM UR: 1500 ML
SPECIMEN, SPNG1: NORMAL
SPECIMEN, SPNG1: NORMAL
STATUS OF UNIT,%ST: NORMAL
THERAPEUTIC RANGE,PTTT: NORMAL SECS (ref 58–77)
THERAPEUTIC RANGE,PTTT: NORMAL SECS (ref 58–77)
TIBC SERPL-MCNC: 217 UG/DL (ref 250–450)
TROPONIN I SERPL-MCNC: <0.05 NG/ML
TSH SERPL DL<=0.05 MIU/L-ACNC: 3.4 UIU/ML (ref 0.36–3.74)
UA: UC IF INDICATED,UAUC: ABNORMAL
UNIT DIVISION, %UDIV: 0
UROBILINOGEN UR QL STRIP.AUTO: 0.2 EU/DL (ref 0.2–1)
UROBILINOGEN UR QL STRIP.AUTO: 1 EU/DL (ref 0.2–1)
VENTRICULAR RATE, ECG03: 116 BPM
VENTRICULAR RATE, ECG03: 122 BPM
VENTRICULAR RATE, ECG03: 79 BPM
VENTRICULAR RATE, ECG03: 86 BPM
VIT B12 SERPL-MCNC: 597 PG/ML (ref 193–986)
WBC # BLD AUTO: 11.5 K/UL (ref 3.6–11)
WBC # BLD AUTO: 11.7 K/UL (ref 3.6–11)
WBC # BLD AUTO: 12.5 K/UL (ref 3.6–11)
WBC # BLD AUTO: 12.6 K/UL (ref 3.6–11)
WBC # BLD AUTO: 13 K/UL (ref 3.6–11)
WBC # BLD AUTO: 13 K/UL (ref 3.6–11)
WBC # BLD AUTO: 13.1 K/UL (ref 3.6–11)
WBC # BLD AUTO: 13.2 K/UL (ref 3.6–11)
WBC # BLD AUTO: 13.2 K/UL (ref 3.6–11)
WBC # BLD AUTO: 13.4 K/UL (ref 3.6–11)
WBC # BLD AUTO: 13.4 K/UL (ref 3.6–11)
WBC # BLD AUTO: 14 K/UL (ref 3.6–11)
WBC # BLD AUTO: 14.1 K/UL (ref 3.6–11)
WBC # BLD AUTO: 14.3 K/UL (ref 3.6–11)
WBC # BLD AUTO: 14.3 K/UL (ref 3.6–11)
WBC # BLD AUTO: 14.4 K/UL (ref 3.6–11)
WBC # BLD AUTO: 14.4 K/UL (ref 3.6–11)
WBC # BLD AUTO: 14.5 K/UL (ref 3.6–11)
WBC # BLD AUTO: 14.6 K/UL (ref 3.6–11)
WBC # BLD AUTO: 14.6 K/UL (ref 3.6–11)
WBC # BLD AUTO: 14.8 K/UL (ref 3.6–11)
WBC # BLD AUTO: 14.8 K/UL (ref 3.6–11)
WBC # BLD AUTO: 15.1 K/UL (ref 3.6–11)
WBC # BLD AUTO: 15.1 K/UL (ref 3.6–11)
WBC # BLD AUTO: 15.3 K/UL (ref 3.6–11)
WBC # BLD AUTO: 15.4 K/UL (ref 3.6–11)
WBC # BLD AUTO: 15.4 K/UL (ref 3.6–11)
WBC # BLD AUTO: 15.5 K/UL (ref 3.6–11)
WBC # BLD AUTO: 15.6 K/UL (ref 3.6–11)
WBC # BLD AUTO: 15.6 K/UL (ref 3.6–11)
WBC # BLD AUTO: 15.7 K/UL (ref 3.6–11)
WBC # BLD AUTO: 15.9 K/UL (ref 3.6–11)
WBC # BLD AUTO: 16.1 K/UL (ref 3.6–11)
WBC # BLD AUTO: 16.3 K/UL (ref 3.6–11)
WBC # BLD AUTO: 16.3 K/UL (ref 3.6–11)
WBC # BLD AUTO: 16.5 K/UL (ref 3.6–11)
WBC # BLD AUTO: 17 K/UL (ref 3.6–11)
WBC # BLD AUTO: 17.2 K/UL (ref 3.6–11)
WBC # BLD AUTO: 18.4 K/UL (ref 3.6–11)
WBC # BLD AUTO: 18.4 K/UL (ref 3.6–11)
WBC # BLD AUTO: 19.3 K/UL (ref 3.6–11)
WBC # BLD AUTO: 8.9 K/UL (ref 3.6–11)
WBC MORPH BLD: ABNORMAL
WBC URNS QL MICRO: ABNORMAL /HPF (ref 0–4)
WBC URNS QL MICRO: ABNORMAL /HPF (ref 0–4)

## 2021-01-01 PROCEDURE — 77010033678 HC OXYGEN DAILY

## 2021-01-01 PROCEDURE — 74011000250 HC RX REV CODE- 250: Performed by: INTERNAL MEDICINE

## 2021-01-01 PROCEDURE — 74011250637 HC RX REV CODE- 250/637: Performed by: FAMILY MEDICINE

## 2021-01-01 PROCEDURE — 65660000000 HC RM CCU STEPDOWN

## 2021-01-01 PROCEDURE — 74011250637 HC RX REV CODE- 250/637: Performed by: SPECIALIST

## 2021-01-01 PROCEDURE — 82962 GLUCOSE BLOOD TEST: CPT

## 2021-01-01 PROCEDURE — 73140 X-RAY EXAM OF FINGER(S): CPT

## 2021-01-01 PROCEDURE — 99233 SBSQ HOSP IP/OBS HIGH 50: CPT | Performed by: CLINICAL NURSE SPECIALIST

## 2021-01-01 PROCEDURE — 77030038269 HC DRN EXT URIN PURWCK BARD -A

## 2021-01-01 PROCEDURE — 0651 HSPC ROUTINE HOME CARE

## 2021-01-01 PROCEDURE — 80053 COMPREHEN METABOLIC PANEL: CPT

## 2021-01-01 PROCEDURE — 74011250636 HC RX REV CODE- 250/636: Performed by: EMERGENCY MEDICINE

## 2021-01-01 PROCEDURE — 99232 SBSQ HOSP IP/OBS MODERATE 35: CPT | Performed by: SPECIALIST

## 2021-01-01 PROCEDURE — 74011250637 HC RX REV CODE- 250/637: Performed by: INTERNAL MEDICINE

## 2021-01-01 PROCEDURE — 74011250637 HC RX REV CODE- 250/637: Performed by: NURSE PRACTITIONER

## 2021-01-01 PROCEDURE — 82272 OCCULT BLD FECES 1-3 TESTS: CPT

## 2021-01-01 PROCEDURE — 99231 SBSQ HOSP IP/OBS SF/LOW 25: CPT | Performed by: NURSE PRACTITIONER

## 2021-01-01 PROCEDURE — 93325 DOPPLER ECHO COLOR FLOW MAPG: CPT

## 2021-01-01 PROCEDURE — 74011636637 HC RX REV CODE- 636/637: Performed by: PHYSICIAN ASSISTANT

## 2021-01-01 PROCEDURE — 74011636637 HC RX REV CODE- 636/637: Performed by: FAMILY MEDICINE

## 2021-01-01 PROCEDURE — 74011000250 HC RX REV CODE- 250: Performed by: EMERGENCY MEDICINE

## 2021-01-01 PROCEDURE — 83880 ASSAY OF NATRIURETIC PEPTIDE: CPT

## 2021-01-01 PROCEDURE — 74011250636 HC RX REV CODE- 250/636: Performed by: PHYSICIAN ASSISTANT

## 2021-01-01 PROCEDURE — 94640 AIRWAY INHALATION TREATMENT: CPT

## 2021-01-01 PROCEDURE — 81001 URINALYSIS AUTO W/SCOPE: CPT

## 2021-01-01 PROCEDURE — 74011000258 HC RX REV CODE- 258: Performed by: NURSE PRACTITIONER

## 2021-01-01 PROCEDURE — 36415 COLL VENOUS BLD VENIPUNCTURE: CPT

## 2021-01-01 PROCEDURE — 85025 COMPLETE CBC W/AUTO DIFF WBC: CPT

## 2021-01-01 PROCEDURE — 74011250636 HC RX REV CODE- 250/636: Performed by: NURSE PRACTITIONER

## 2021-01-01 PROCEDURE — 74011250636 HC RX REV CODE- 250/636: Performed by: HOSPITALIST

## 2021-01-01 PROCEDURE — 2709999900 HC NON-CHARGEABLE SUPPLY

## 2021-01-01 PROCEDURE — 87635 SARS-COV-2 COVID-19 AMP PRB: CPT

## 2021-01-01 PROCEDURE — 0DB68ZX EXCISION OF STOMACH, VIA NATURAL OR ARTIFICIAL OPENING ENDOSCOPIC, DIAGNOSTIC: ICD-10-PCS | Performed by: FAMILY MEDICINE

## 2021-01-01 PROCEDURE — 71045 X-RAY EXAM CHEST 1 VIEW: CPT

## 2021-01-01 PROCEDURE — 99232 SBSQ HOSP IP/OBS MODERATE 35: CPT | Performed by: INTERNAL MEDICINE

## 2021-01-01 PROCEDURE — XW033N5 INTRODUCTION OF MEROPENEM-VABORBACTAM ANTI-INFECTIVE INTO PERIPHERAL VEIN, PERCUTANEOUS APPROACH, NEW TECHNOLOGY GROUP 5: ICD-10-PCS | Performed by: FAMILY MEDICINE

## 2021-01-01 PROCEDURE — 77030020847 HC STATLOK BARD -A

## 2021-01-01 PROCEDURE — 86901 BLOOD TYPING SEROLOGIC RH(D): CPT

## 2021-01-01 PROCEDURE — C9113 INJ PANTOPRAZOLE SODIUM, VIA: HCPCS | Performed by: HOSPITALIST

## 2021-01-01 PROCEDURE — 74011000250 HC RX REV CODE- 250: Performed by: PHYSICIAN ASSISTANT

## 2021-01-01 PROCEDURE — 74011250636 HC RX REV CODE- 250/636: Performed by: INTERNAL MEDICINE

## 2021-01-01 PROCEDURE — 86923 COMPATIBILITY TEST ELECTRIC: CPT

## 2021-01-01 PROCEDURE — 82784 ASSAY IGA/IGD/IGG/IGM EACH: CPT

## 2021-01-01 PROCEDURE — 77030038554 HC DRSG WND THERAHONEY MDII -Z

## 2021-01-01 PROCEDURE — 99285 EMERGENCY DEPT VISIT HI MDM: CPT

## 2021-01-01 PROCEDURE — 94664 DEMO&/EVAL PT USE INHALER: CPT

## 2021-01-01 PROCEDURE — 74011000250 HC RX REV CODE- 250: Performed by: FAMILY MEDICINE

## 2021-01-01 PROCEDURE — 97530 THERAPEUTIC ACTIVITIES: CPT

## 2021-01-01 PROCEDURE — G0299 HHS/HOSPICE OF RN EA 15 MIN: HCPCS

## 2021-01-01 PROCEDURE — 82705 FATS/LIPIDS FECES QUAL: CPT

## 2021-01-01 PROCEDURE — 74011250636 HC RX REV CODE- 250/636: Performed by: FAMILY MEDICINE

## 2021-01-01 PROCEDURE — 3336500001 HSPC ELECTION

## 2021-01-01 PROCEDURE — 85045 AUTOMATED RETICULOCYTE COUNT: CPT

## 2021-01-01 PROCEDURE — 83735 ASSAY OF MAGNESIUM: CPT

## 2021-01-01 PROCEDURE — 74011000250 HC RX REV CODE- 250: Performed by: NURSE PRACTITIONER

## 2021-01-01 PROCEDURE — 36430 TRANSFUSION BLD/BLD COMPNT: CPT

## 2021-01-01 PROCEDURE — 94760 N-INVAS EAR/PLS OXIMETRY 1: CPT

## 2021-01-01 PROCEDURE — 97110 THERAPEUTIC EXERCISES: CPT

## 2021-01-01 PROCEDURE — 99223 1ST HOSP IP/OBS HIGH 75: CPT | Performed by: SPECIALIST

## 2021-01-01 PROCEDURE — 76040000007: Performed by: INTERNAL MEDICINE

## 2021-01-01 PROCEDURE — 84484 ASSAY OF TROPONIN QUANT: CPT

## 2021-01-01 PROCEDURE — 74011000258 HC RX REV CODE- 258: Performed by: INTERNAL MEDICINE

## 2021-01-01 PROCEDURE — 99231 SBSQ HOSP IP/OBS SF/LOW 25: CPT | Performed by: INTERNAL MEDICINE

## 2021-01-01 PROCEDURE — 74011250637 HC RX REV CODE- 250/637: Performed by: HOSPITALIST

## 2021-01-01 PROCEDURE — 77030021593 HC FCPS BIOP ENDOSC BSC -A: Performed by: INTERNAL MEDICINE

## 2021-01-01 PROCEDURE — 74178 CT ABD&PLV WO CNTR FLWD CNTR: CPT

## 2021-01-01 PROCEDURE — P9016 RBC LEUKOCYTES REDUCED: HCPCS

## 2021-01-01 PROCEDURE — 99233 SBSQ HOSP IP/OBS HIGH 50: CPT | Performed by: INTERNAL MEDICINE

## 2021-01-01 PROCEDURE — 87899 AGENT NOS ASSAY W/OPTIC: CPT

## 2021-01-01 PROCEDURE — 99231 SBSQ HOSP IP/OBS SF/LOW 25: CPT | Performed by: CLINICAL NURSE SPECIALIST

## 2021-01-01 PROCEDURE — 74011636637 HC RX REV CODE- 636/637: Performed by: INTERNAL MEDICINE

## 2021-01-01 PROCEDURE — 80048 BASIC METABOLIC PNL TOTAL CA: CPT

## 2021-01-01 PROCEDURE — 0DBL8ZZ EXCISION OF TRANSVERSE COLON, VIA NATURAL OR ARTIFICIAL OPENING ENDOSCOPIC: ICD-10-PCS | Performed by: FAMILY MEDICINE

## 2021-01-01 PROCEDURE — 99222 1ST HOSP IP/OBS MODERATE 55: CPT | Performed by: INTERNAL MEDICINE

## 2021-01-01 PROCEDURE — 97162 PT EVAL MOD COMPLEX 30 MIN: CPT

## 2021-01-01 PROCEDURE — 65270000029 HC RM PRIVATE

## 2021-01-01 PROCEDURE — 74011000250 HC RX REV CODE- 250

## 2021-01-01 PROCEDURE — 85384 FIBRINOGEN ACTIVITY: CPT

## 2021-01-01 PROCEDURE — 99231 SBSQ HOSP IP/OBS SF/LOW 25: CPT | Performed by: STUDENT IN AN ORGANIZED HEALTH CARE EDUCATION/TRAINING PROGRAM

## 2021-01-01 PROCEDURE — 82728 ASSAY OF FERRITIN: CPT

## 2021-01-01 PROCEDURE — 83605 ASSAY OF LACTIC ACID: CPT

## 2021-01-01 PROCEDURE — 0DJ07ZZ INSPECTION OF UPPER INTESTINAL TRACT, VIA NATURAL OR ARTIFICIAL OPENING: ICD-10-PCS | Performed by: SPECIALIST

## 2021-01-01 PROCEDURE — 86850 RBC ANTIBODY SCREEN: CPT

## 2021-01-01 PROCEDURE — 87077 CULTURE AEROBIC IDENTIFY: CPT

## 2021-01-01 PROCEDURE — 93005 ELECTROCARDIOGRAM TRACING: CPT

## 2021-01-01 PROCEDURE — 84443 ASSAY THYROID STIM HORMONE: CPT

## 2021-01-01 PROCEDURE — 74011250636 HC RX REV CODE- 250/636: Performed by: SPECIALIST

## 2021-01-01 PROCEDURE — 84100 ASSAY OF PHOSPHORUS: CPT

## 2021-01-01 PROCEDURE — 83036 HEMOGLOBIN GLYCOSYLATED A1C: CPT

## 2021-01-01 PROCEDURE — HOSPICE MEDICATION HC HH HOSPICE MEDICATION

## 2021-01-01 PROCEDURE — 85610 PROTHROMBIN TIME: CPT

## 2021-01-01 PROCEDURE — 85730 THROMBOPLASTIN TIME PARTIAL: CPT

## 2021-01-01 PROCEDURE — 74011000258 HC RX REV CODE- 258: Performed by: SPECIALIST

## 2021-01-01 PROCEDURE — 77030029684 HC NEB SM VOL KT MONA -A

## 2021-01-01 PROCEDURE — 82607 VITAMIN B-12: CPT

## 2021-01-01 PROCEDURE — 85027 COMPLETE CBC AUTOMATED: CPT

## 2021-01-01 PROCEDURE — 85379 FIBRIN DEGRADATION QUANT: CPT

## 2021-01-01 PROCEDURE — 74011000258 HC RX REV CODE- 258: Performed by: FAMILY MEDICINE

## 2021-01-01 PROCEDURE — 87077 CULTURE AEROBIC IDENTIFY: CPT | Performed by: INTERNAL MEDICINE

## 2021-01-01 PROCEDURE — 77030013992 HC SNR POLYP ENDOSC BSC -B: Performed by: INTERNAL MEDICINE

## 2021-01-01 PROCEDURE — 0DBH8ZX EXCISION OF CECUM, VIA NATURAL OR ARTIFICIAL OPENING ENDOSCOPIC, DIAGNOSTIC: ICD-10-PCS | Performed by: FAMILY MEDICINE

## 2021-01-01 PROCEDURE — 83540 ASSAY OF IRON: CPT

## 2021-01-01 PROCEDURE — 82306 VITAMIN D 25 HYDROXY: CPT

## 2021-01-01 PROCEDURE — 30233N1 TRANSFUSION OF NONAUTOLOGOUS RED BLOOD CELLS INTO PERIPHERAL VEIN, PERCUTANEOUS APPROACH: ICD-10-PCS | Performed by: FAMILY MEDICINE

## 2021-01-01 PROCEDURE — 74011250636 HC RX REV CODE- 250/636: Performed by: NURSE ANESTHETIST, CERTIFIED REGISTERED

## 2021-01-01 PROCEDURE — 83615 LACTATE (LD) (LDH) ENZYME: CPT

## 2021-01-01 PROCEDURE — 84145 PROCALCITONIN (PCT): CPT

## 2021-01-01 PROCEDURE — 2709999900 HC NON-CHARGEABLE SUPPLY: Performed by: INTERNAL MEDICINE

## 2021-01-01 PROCEDURE — 99233 SBSQ HOSP IP/OBS HIGH 50: CPT | Performed by: NURSE PRACTITIONER

## 2021-01-01 PROCEDURE — 82746 ASSAY OF FOLIC ACID SERUM: CPT

## 2021-01-01 PROCEDURE — 87086 URINE CULTURE/COLONY COUNT: CPT

## 2021-01-01 PROCEDURE — 80162 ASSAY OF DIGOXIN TOTAL: CPT

## 2021-01-01 PROCEDURE — 84156 ASSAY OF PROTEIN URINE: CPT

## 2021-01-01 PROCEDURE — 74011250637 HC RX REV CODE- 250/637: Performed by: PHYSICIAN ASSISTANT

## 2021-01-01 PROCEDURE — 94762 N-INVAS EAR/PLS OXIMTRY CONT: CPT

## 2021-01-01 PROCEDURE — 93308 TTE F-UP OR LMTD: CPT | Performed by: INTERNAL MEDICINE

## 2021-01-01 PROCEDURE — 93923 UPR/LXTR ART STDY 3+ LVLS: CPT

## 2021-01-01 PROCEDURE — 86140 C-REACTIVE PROTEIN: CPT

## 2021-01-01 PROCEDURE — 76040000019: Performed by: INTERNAL MEDICINE

## 2021-01-01 PROCEDURE — 99232 SBSQ HOSP IP/OBS MODERATE 35: CPT | Performed by: STUDENT IN AN ORGANIZED HEALTH CARE EDUCATION/TRAINING PROGRAM

## 2021-01-01 PROCEDURE — 97165 OT EVAL LOW COMPLEX 30 MIN: CPT | Performed by: OCCUPATIONAL THERAPIST

## 2021-01-01 PROCEDURE — 74011000250 HC RX REV CODE- 250: Performed by: HOSPITALIST

## 2021-01-01 PROCEDURE — 30233P1 TRANSFUSION OF NONAUTOLOGOUS FROZEN RED CELLS INTO PERIPHERAL VEIN, PERCUTANEOUS APPROACH: ICD-10-PCS | Performed by: FAMILY MEDICINE

## 2021-01-01 PROCEDURE — U0005 INFEC AGEN DETEC AMPLI PROBE: HCPCS

## 2021-01-01 PROCEDURE — 74018 RADEX ABDOMEN 1 VIEW: CPT

## 2021-01-01 PROCEDURE — 83520 IMMUNOASSAY QUANT NOS NONAB: CPT

## 2021-01-01 PROCEDURE — 76060000032 HC ANESTHESIA 0.5 TO 1 HR: Performed by: INTERNAL MEDICINE

## 2021-01-01 PROCEDURE — 99284 EMERGENCY DEPT VISIT MOD MDM: CPT

## 2021-01-01 PROCEDURE — 99223 1ST HOSP IP/OBS HIGH 75: CPT | Performed by: NURSE PRACTITIONER

## 2021-01-01 PROCEDURE — 97535 SELF CARE MNGMENT TRAINING: CPT | Performed by: OCCUPATIONAL THERAPIST

## 2021-01-01 PROCEDURE — 74011000258 HC RX REV CODE- 258: Performed by: EMERGENCY MEDICINE

## 2021-01-01 PROCEDURE — 88305 TISSUE EXAM BY PATHOLOGIST: CPT

## 2021-01-01 PROCEDURE — 87040 BLOOD CULTURE FOR BACTERIA: CPT

## 2021-01-01 PROCEDURE — 51798 US URINE CAPACITY MEASURE: CPT

## 2021-01-01 PROCEDURE — 99356 PR PROLONGED SVC I/P OR OBS SETTING 1ST HOUR: CPT | Performed by: NURSE PRACTITIONER

## 2021-01-01 PROCEDURE — 83010 ASSAY OF HAPTOGLOBIN QUANT: CPT

## 2021-01-01 PROCEDURE — 97530 THERAPEUTIC ACTIVITIES: CPT | Performed by: PHYSICAL THERAPIST

## 2021-01-01 PROCEDURE — 87449 NOS EACH ORGANISM AG IA: CPT

## 2021-01-01 PROCEDURE — 74011000636 HC RX REV CODE- 636: Performed by: RADIOLOGY

## 2021-01-01 PROCEDURE — 86900 BLOOD TYPING SEROLOGIC ABO: CPT

## 2021-01-01 PROCEDURE — 87186 SC STD MICRODIL/AGAR DIL: CPT

## 2021-01-01 PROCEDURE — 86738 MYCOPLASMA ANTIBODY: CPT

## 2021-01-01 PROCEDURE — 77030027138 HC INCENT SPIROMETER -A

## 2021-01-01 PROCEDURE — 0DB98ZX EXCISION OF DUODENUM, VIA NATURAL OR ARTIFICIAL OPENING ENDOSCOPIC, DIAGNOSTIC: ICD-10-PCS | Performed by: FAMILY MEDICINE

## 2021-01-01 PROCEDURE — 74011000250 HC RX REV CODE- 250: Performed by: NURSE ANESTHETIST, CERTIFIED REGISTERED

## 2021-01-01 RX ORDER — SODIUM CHLORIDE 9 MG/ML
250 INJECTION, SOLUTION INTRAVENOUS AS NEEDED
Status: DISCONTINUED | OUTPATIENT
Start: 2021-01-01 | End: 2021-01-01 | Stop reason: HOSPADM

## 2021-01-01 RX ORDER — MIDAZOLAM HYDROCHLORIDE 1 MG/ML
.25-5 INJECTION, SOLUTION INTRAMUSCULAR; INTRAVENOUS
Status: DISCONTINUED | OUTPATIENT
Start: 2021-01-01 | End: 2021-01-01 | Stop reason: HOSPADM

## 2021-01-01 RX ORDER — DILTIAZEM HYDROCHLORIDE 240 MG/1
240 CAPSULE, COATED, EXTENDED RELEASE ORAL DAILY
Status: DISCONTINUED | OUTPATIENT
Start: 2021-01-01 | End: 2021-01-01

## 2021-01-01 RX ORDER — METOPROLOL TARTRATE 25 MG/1
25 TABLET, FILM COATED ORAL EVERY 6 HOURS
Status: DISCONTINUED | OUTPATIENT
Start: 2021-01-01 | End: 2021-01-01

## 2021-01-01 RX ORDER — FUROSEMIDE 10 MG/ML
40 INJECTION INTRAMUSCULAR; INTRAVENOUS DAILY
Status: DISCONTINUED | OUTPATIENT
Start: 2021-01-01 | End: 2021-01-01

## 2021-01-01 RX ORDER — INSULIN LISPRO 100 [IU]/ML
6 INJECTION, SOLUTION INTRAVENOUS; SUBCUTANEOUS ONCE
Status: COMPLETED | OUTPATIENT
Start: 2021-01-01 | End: 2021-01-01

## 2021-01-01 RX ORDER — PREDNISONE 5 MG/1
10 TABLET ORAL
Status: DISCONTINUED | OUTPATIENT
Start: 2021-01-01 | End: 2021-01-01

## 2021-01-01 RX ORDER — PREDNISONE 20 MG/1
20 TABLET ORAL
Status: DISCONTINUED | OUTPATIENT
Start: 2021-01-01 | End: 2021-01-01 | Stop reason: HOSPADM

## 2021-01-01 RX ORDER — DILTIAZEM HYDROCHLORIDE 180 MG/1
360 CAPSULE, COATED, EXTENDED RELEASE ORAL DAILY
Status: DISCONTINUED | OUTPATIENT
Start: 2021-01-01 | End: 2021-01-01

## 2021-01-01 RX ORDER — ACETAMINOPHEN 325 MG/1
650 TABLET ORAL
Status: DISCONTINUED | OUTPATIENT
Start: 2021-01-01 | End: 2021-01-01 | Stop reason: HOSPADM

## 2021-01-01 RX ORDER — FACIAL-BODY WIPES
10 EACH TOPICAL DAILY PRN
Status: DISCONTINUED | OUTPATIENT
Start: 2021-01-01 | End: 2021-01-01 | Stop reason: HOSPADM

## 2021-01-01 RX ORDER — MELATONIN
2000 DAILY
Status: DISCONTINUED | OUTPATIENT
Start: 2021-01-01 | End: 2021-01-01 | Stop reason: HOSPADM

## 2021-01-01 RX ORDER — SODIUM CHLORIDE 0.9 % (FLUSH) 0.9 %
5-40 SYRINGE (ML) INJECTION AS NEEDED
Status: DISCONTINUED | OUTPATIENT
Start: 2021-01-01 | End: 2021-01-01 | Stop reason: HOSPADM

## 2021-01-01 RX ORDER — IPRATROPIUM BROMIDE AND ALBUTEROL SULFATE 2.5; .5 MG/3ML; MG/3ML
3 SOLUTION RESPIRATORY (INHALATION)
Status: DISCONTINUED | OUTPATIENT
Start: 2021-01-01 | End: 2021-01-01 | Stop reason: HOSPADM

## 2021-01-01 RX ORDER — ONDANSETRON 2 MG/ML
4 INJECTION INTRAMUSCULAR; INTRAVENOUS
Status: DISCONTINUED | OUTPATIENT
Start: 2021-01-01 | End: 2021-01-01 | Stop reason: HOSPADM

## 2021-01-01 RX ORDER — POLYETHYLENE GLYCOL 3350 17 G/17G
17 POWDER, FOR SOLUTION ORAL DAILY PRN
Status: DISCONTINUED | OUTPATIENT
Start: 2021-01-01 | End: 2021-01-01 | Stop reason: HOSPADM

## 2021-01-01 RX ORDER — PREDNISONE 20 MG/1
20 TABLET ORAL 2 TIMES DAILY WITH MEALS
Status: DISCONTINUED | OUTPATIENT
Start: 2021-01-01 | End: 2021-01-01

## 2021-01-01 RX ORDER — SODIUM CHLORIDE 0.9 % (FLUSH) 0.9 %
5-40 SYRINGE (ML) INJECTION EVERY 8 HOURS
Status: DISCONTINUED | OUTPATIENT
Start: 2021-01-01 | End: 2021-01-01 | Stop reason: HOSPADM

## 2021-01-01 RX ORDER — DILTIAZEM HYDROCHLORIDE 30 MG/1
60 TABLET, FILM COATED ORAL
Status: DISCONTINUED | OUTPATIENT
Start: 2021-01-01 | End: 2021-01-01

## 2021-01-01 RX ORDER — DIGOXIN 0.25 MG/ML
125 INJECTION INTRAMUSCULAR; INTRAVENOUS
Status: ACTIVE | OUTPATIENT
Start: 2021-01-01 | End: 2021-01-01

## 2021-01-01 RX ORDER — AMIODARONE HYDROCHLORIDE 200 MG/1
200 TABLET ORAL DAILY
Status: DISCONTINUED | OUTPATIENT
Start: 2021-01-01 | End: 2021-01-01 | Stop reason: HOSPADM

## 2021-01-01 RX ORDER — POTASSIUM CHLORIDE 750 MG/1
10 TABLET, FILM COATED, EXTENDED RELEASE ORAL 2 TIMES DAILY
Status: DISCONTINUED | OUTPATIENT
Start: 2021-01-01 | End: 2021-01-01

## 2021-01-01 RX ORDER — AZITHROMYCIN 250 MG/1
500 TABLET, FILM COATED ORAL DAILY
Status: DISCONTINUED | OUTPATIENT
Start: 2021-01-01 | End: 2021-01-01

## 2021-01-01 RX ORDER — DIGOXIN 125 MCG
0.12 TABLET ORAL DAILY
Status: DISCONTINUED | OUTPATIENT
Start: 2021-01-01 | End: 2021-01-01 | Stop reason: HOSPADM

## 2021-01-01 RX ORDER — BUDESONIDE 0.5 MG/2ML
500 INHALANT ORAL
Status: DISCONTINUED | OUTPATIENT
Start: 2021-01-01 | End: 2021-01-01 | Stop reason: HOSPADM

## 2021-01-01 RX ORDER — POTASSIUM CHLORIDE 1500 MG/1
20 TABLET, FILM COATED, EXTENDED RELEASE ORAL 2 TIMES DAILY
Qty: 30 TAB | Refills: 0 | Status: SHIPPED
Start: 2021-01-01 | End: 2021-01-01

## 2021-01-01 RX ORDER — NALOXONE HYDROCHLORIDE 0.4 MG/ML
0.4 INJECTION, SOLUTION INTRAMUSCULAR; INTRAVENOUS; SUBCUTANEOUS
Status: DISCONTINUED | OUTPATIENT
Start: 2021-01-01 | End: 2021-01-01 | Stop reason: HOSPADM

## 2021-01-01 RX ORDER — LANOLIN ALCOHOL/MO/W.PET/CERES
325 CREAM (GRAM) TOPICAL
Status: DISCONTINUED | OUTPATIENT
Start: 2021-01-01 | End: 2021-01-01

## 2021-01-01 RX ORDER — ACETAMINOPHEN 650 MG/1
650 SUPPOSITORY RECTAL
Status: DISCONTINUED | OUTPATIENT
Start: 2021-01-01 | End: 2021-01-01 | Stop reason: HOSPADM

## 2021-01-01 RX ORDER — MAGNESIUM SULFATE 100 %
4 CRYSTALS MISCELLANEOUS AS NEEDED
Status: DISCONTINUED | OUTPATIENT
Start: 2021-01-01 | End: 2021-01-01 | Stop reason: HOSPADM

## 2021-01-01 RX ORDER — FUROSEMIDE 40 MG/1
40 TABLET ORAL DAILY
Status: DISCONTINUED | OUTPATIENT
Start: 2021-01-01 | End: 2021-01-01 | Stop reason: HOSPADM

## 2021-01-01 RX ORDER — SODIUM CHLORIDE 0.9 % (FLUSH) 0.9 %
5-10 SYRINGE (ML) INJECTION AS NEEDED
Status: DISCONTINUED | OUTPATIENT
Start: 2021-01-01 | End: 2021-01-01 | Stop reason: HOSPADM

## 2021-01-01 RX ORDER — DEXTROSE 50 % IN WATER (D50W) INTRAVENOUS SYRINGE
25-50 AS NEEDED
Status: DISCONTINUED | OUTPATIENT
Start: 2021-01-01 | End: 2021-01-01 | Stop reason: HOSPADM

## 2021-01-01 RX ORDER — DILTIAZEM HYDROCHLORIDE 30 MG/1
30 TABLET, FILM COATED ORAL EVERY 6 HOURS
Status: DISCONTINUED | OUTPATIENT
Start: 2021-01-01 | End: 2021-01-01

## 2021-01-01 RX ORDER — IPRATROPIUM BROMIDE 0.5 MG/2.5ML
0.5 SOLUTION RESPIRATORY (INHALATION)
Status: DISCONTINUED | OUTPATIENT
Start: 2021-01-01 | End: 2021-01-01 | Stop reason: HOSPADM

## 2021-01-01 RX ORDER — DILTIAZEM HYDROCHLORIDE 120 MG/1
240 CAPSULE, COATED, EXTENDED RELEASE ORAL DAILY
Status: DISCONTINUED | OUTPATIENT
Start: 2021-01-01 | End: 2021-01-01

## 2021-01-01 RX ORDER — POTASSIUM CHLORIDE 750 MG/1
40 TABLET, FILM COATED, EXTENDED RELEASE ORAL 2 TIMES DAILY
Status: COMPLETED | OUTPATIENT
Start: 2021-01-01 | End: 2021-01-01

## 2021-01-01 RX ORDER — IPRATROPIUM BROMIDE AND ALBUTEROL SULFATE 2.5; .5 MG/3ML; MG/3ML
3 SOLUTION RESPIRATORY (INHALATION)
Status: DISCONTINUED | OUTPATIENT
Start: 2021-01-01 | End: 2021-01-01

## 2021-01-01 RX ORDER — DILTIAZEM HYDROCHLORIDE 180 MG/1
360 CAPSULE, COATED, EXTENDED RELEASE ORAL DAILY
Status: DISCONTINUED | OUTPATIENT
Start: 2021-01-01 | End: 2021-01-01 | Stop reason: HOSPADM

## 2021-01-01 RX ORDER — LIDOCAINE HYDROCHLORIDE 20 MG/ML
INJECTION, SOLUTION EPIDURAL; INFILTRATION; INTRACAUDAL; PERINEURAL AS NEEDED
Status: DISCONTINUED | OUTPATIENT
Start: 2021-01-01 | End: 2021-01-01 | Stop reason: HOSPADM

## 2021-01-01 RX ORDER — LOPERAMIDE HYDROCHLORIDE 2 MG/1
4 CAPSULE ORAL
Qty: 30 CAP | Refills: 0 | Status: SHIPPED
Start: 2021-01-01

## 2021-01-01 RX ORDER — NYSTATIN 100000 [USP'U]/ML
500000 SUSPENSION ORAL 4 TIMES DAILY
Status: DISCONTINUED | OUTPATIENT
Start: 2021-01-01 | End: 2021-01-01 | Stop reason: SDUPTHER

## 2021-01-01 RX ORDER — CYANOCOBALAMIN 1000 UG/ML
1000 INJECTION, SOLUTION INTRAMUSCULAR; SUBCUTANEOUS DAILY
Status: COMPLETED | OUTPATIENT
Start: 2021-01-01 | End: 2021-01-01

## 2021-01-01 RX ORDER — ATROPINE SULFATE 0.1 MG/ML
0.5 INJECTION INTRAVENOUS
Status: DISCONTINUED | OUTPATIENT
Start: 2021-01-01 | End: 2021-01-01 | Stop reason: HOSPADM

## 2021-01-01 RX ORDER — NYSTATIN 100000 [USP'U]/ML
500000 SUSPENSION ORAL 4 TIMES DAILY
Status: COMPLETED | OUTPATIENT
Start: 2021-01-01 | End: 2021-01-01

## 2021-01-01 RX ORDER — METOPROLOL TARTRATE 25 MG/1
25 TABLET, FILM COATED ORAL EVERY 12 HOURS
Status: DISCONTINUED | OUTPATIENT
Start: 2021-01-01 | End: 2021-01-01

## 2021-01-01 RX ORDER — LANOLIN ALCOHOL/MO/W.PET/CERES
1000 CREAM (GRAM) TOPICAL DAILY
Status: DISCONTINUED | OUTPATIENT
Start: 2021-01-01 | End: 2021-01-01 | Stop reason: HOSPADM

## 2021-01-01 RX ORDER — DILTIAZEM HYDROCHLORIDE 5 MG/ML
INJECTION INTRAVENOUS
Status: COMPLETED
Start: 2021-01-01 | End: 2021-01-01

## 2021-01-01 RX ORDER — FENTANYL CITRATE 50 UG/ML
100 INJECTION, SOLUTION INTRAMUSCULAR; INTRAVENOUS
Status: DISCONTINUED | OUTPATIENT
Start: 2021-01-01 | End: 2021-01-01 | Stop reason: HOSPADM

## 2021-01-01 RX ORDER — ALBUTEROL SULFATE 90 UG/1
2 AEROSOL, METERED RESPIRATORY (INHALATION)
Status: DISCONTINUED | OUTPATIENT
Start: 2021-01-01 | End: 2021-01-01

## 2021-01-01 RX ORDER — MORPHINE SULFATE 2 MG/ML
1 INJECTION, SOLUTION INTRAMUSCULAR; INTRAVENOUS
Status: DISCONTINUED | OUTPATIENT
Start: 2021-01-01 | End: 2021-01-01 | Stop reason: HOSPADM

## 2021-01-01 RX ORDER — ONDANSETRON 4 MG/1
4 TABLET, FILM COATED ORAL
COMMUNITY
End: 2021-01-01

## 2021-01-01 RX ORDER — NYSTATIN 100000 [USP'U]/ML
500000 SUSPENSION ORAL 4 TIMES DAILY
Status: DISCONTINUED | OUTPATIENT
Start: 2021-01-01 | End: 2021-01-01 | Stop reason: HOSPADM

## 2021-01-01 RX ORDER — FLUMAZENIL 0.1 MG/ML
0.2 INJECTION INTRAVENOUS
Status: DISCONTINUED | OUTPATIENT
Start: 2021-01-01 | End: 2021-01-01 | Stop reason: HOSPADM

## 2021-01-01 RX ORDER — NYSTATIN 100000 [USP'U]/ML
500000 SUSPENSION ORAL
Qty: 200 ML | Refills: 0 | Status: SHIPPED
Start: 2021-01-01 | End: 2021-03-15

## 2021-01-01 RX ORDER — GUAIFENESIN 100 MG/5ML
81 LIQUID (ML) ORAL DAILY
Status: DISCONTINUED | OUTPATIENT
Start: 2021-01-01 | End: 2021-01-01

## 2021-01-01 RX ORDER — EPINEPHRINE 0.1 MG/ML
1 INJECTION INTRACARDIAC; INTRAVENOUS
Status: DISCONTINUED | OUTPATIENT
Start: 2021-01-01 | End: 2021-01-01 | Stop reason: HOSPADM

## 2021-01-01 RX ORDER — FUROSEMIDE 40 MG/1
40 TABLET ORAL 2 TIMES DAILY
COMMUNITY
End: 2021-01-01

## 2021-01-01 RX ORDER — DIGOXIN 125 MCG
0.12 TABLET ORAL DAILY
Status: DISCONTINUED | OUTPATIENT
Start: 2021-01-01 | End: 2021-01-01

## 2021-01-01 RX ORDER — TRAMADOL HYDROCHLORIDE 50 MG/1
50 TABLET ORAL
Status: DISCONTINUED | OUTPATIENT
Start: 2021-01-01 | End: 2021-01-01 | Stop reason: HOSPADM

## 2021-01-01 RX ORDER — MAGNESIUM SULFATE 100 %
4 CRYSTALS MISCELLANEOUS AS NEEDED
Status: DISCONTINUED | OUTPATIENT
Start: 2021-01-01 | End: 2021-01-01 | Stop reason: SDUPTHER

## 2021-01-01 RX ORDER — DEXTROSE 50 % IN WATER (D50W) INTRAVENOUS SYRINGE
25-50 AS NEEDED
Status: DISCONTINUED | OUTPATIENT
Start: 2021-01-01 | End: 2021-01-01

## 2021-01-01 RX ORDER — POTASSIUM CHLORIDE 750 MG/1
40 TABLET, FILM COATED, EXTENDED RELEASE ORAL 2 TIMES DAILY
Status: DISCONTINUED | OUTPATIENT
Start: 2021-01-01 | End: 2021-01-01

## 2021-01-01 RX ORDER — AMIODARONE HYDROCHLORIDE 200 MG/1
400 TABLET ORAL 2 TIMES DAILY
Status: DISCONTINUED | OUTPATIENT
Start: 2021-01-01 | End: 2021-01-01 | Stop reason: HOSPADM

## 2021-01-01 RX ORDER — MIRTAZAPINE 15 MG/1
15 TABLET, FILM COATED ORAL
Status: DISCONTINUED | OUTPATIENT
Start: 2021-01-01 | End: 2021-01-01 | Stop reason: HOSPADM

## 2021-01-01 RX ORDER — INSULIN LISPRO 100 [IU]/ML
INJECTION, SOLUTION INTRAVENOUS; SUBCUTANEOUS
Status: DISCONTINUED | OUTPATIENT
Start: 2021-01-01 | End: 2021-01-01 | Stop reason: HOSPADM

## 2021-01-01 RX ORDER — FUROSEMIDE 10 MG/ML
40 INJECTION INTRAMUSCULAR; INTRAVENOUS ONCE
Status: COMPLETED | OUTPATIENT
Start: 2021-01-01 | End: 2021-01-01

## 2021-01-01 RX ORDER — DIGOXIN 0.25 MG/ML
250 INJECTION INTRAMUSCULAR; INTRAVENOUS ONCE
Status: COMPLETED | OUTPATIENT
Start: 2021-01-01 | End: 2021-01-01

## 2021-01-01 RX ORDER — LANOLIN ALCOHOL/MO/W.PET/CERES
325 CREAM (GRAM) TOPICAL
Status: DISCONTINUED | OUTPATIENT
Start: 2021-01-01 | End: 2021-01-01 | Stop reason: HOSPADM

## 2021-01-01 RX ORDER — PREDNISONE 20 MG/1
20 TABLET ORAL
Qty: 30 TAB | Refills: 0 | Status: SHIPPED
Start: 2021-01-01 | End: 2021-01-01

## 2021-01-01 RX ORDER — DILTIAZEM HYDROCHLORIDE 5 MG/ML
10 INJECTION INTRAVENOUS ONCE
Status: COMPLETED | OUTPATIENT
Start: 2021-01-01 | End: 2021-01-01

## 2021-01-01 RX ORDER — PANTOPRAZOLE SODIUM 40 MG/1
40 TABLET, DELAYED RELEASE ORAL
Status: DISCONTINUED | OUTPATIENT
Start: 2021-01-01 | End: 2021-01-01 | Stop reason: HOSPADM

## 2021-01-01 RX ORDER — DULOXETIN HYDROCHLORIDE 30 MG/1
60 CAPSULE, DELAYED RELEASE ORAL DAILY
Status: DISCONTINUED | OUTPATIENT
Start: 2021-01-01 | End: 2021-01-01 | Stop reason: HOSPADM

## 2021-01-01 RX ORDER — SODIUM CHLORIDE AND POTASSIUM CHLORIDE .9; .15 G/100ML; G/100ML
SOLUTION INTRAVENOUS CONTINUOUS
Status: DISCONTINUED | OUTPATIENT
Start: 2021-01-01 | End: 2021-01-01

## 2021-01-01 RX ORDER — METOPROLOL TARTRATE 50 MG/1
50 TABLET ORAL 2 TIMES DAILY
Status: DISCONTINUED | OUTPATIENT
Start: 2021-01-01 | End: 2021-01-01 | Stop reason: HOSPADM

## 2021-01-01 RX ORDER — SODIUM CHLORIDE 9 MG/ML
INJECTION, SOLUTION INTRAVENOUS
Status: DISCONTINUED | OUTPATIENT
Start: 2021-01-01 | End: 2021-01-01 | Stop reason: HOSPADM

## 2021-01-01 RX ORDER — ARFORMOTEROL TARTRATE 15 UG/2ML
15 SOLUTION RESPIRATORY (INHALATION)
Status: DISCONTINUED | OUTPATIENT
Start: 2021-01-01 | End: 2021-01-01 | Stop reason: HOSPADM

## 2021-01-01 RX ORDER — DILTIAZEM HYDROCHLORIDE 5 MG/ML
5 INJECTION INTRAVENOUS ONCE
Status: COMPLETED | OUTPATIENT
Start: 2021-01-01 | End: 2021-01-01

## 2021-01-01 RX ORDER — NYSTATIN 100000 [USP'U]/ML
500000 SUSPENSION ORAL
Status: DISCONTINUED | OUTPATIENT
Start: 2021-01-01 | End: 2021-01-01 | Stop reason: HOSPADM

## 2021-01-01 RX ORDER — FUROSEMIDE 40 MG/1
40 TABLET ORAL DAILY
Qty: 30 TAB | Refills: 0 | Status: ON HOLD
Start: 2021-01-01 | End: 2021-01-01 | Stop reason: CLARIF

## 2021-01-01 RX ORDER — METOPROLOL TARTRATE 25 MG/1
25 TABLET, FILM COATED ORAL ONCE
Status: COMPLETED | OUTPATIENT
Start: 2021-01-01 | End: 2021-01-01

## 2021-01-01 RX ORDER — POTASSIUM CHLORIDE 750 MG/1
40 TABLET, FILM COATED, EXTENDED RELEASE ORAL EVERY 4 HOURS
Status: COMPLETED | OUTPATIENT
Start: 2021-01-01 | End: 2021-01-01

## 2021-01-01 RX ORDER — AMIODARONE HYDROCHLORIDE 200 MG/1
400 TABLET ORAL 2 TIMES DAILY
Status: ACTIVE | OUTPATIENT
Start: 2021-01-01 | End: 2021-01-01

## 2021-01-01 RX ORDER — AMIODARONE HYDROCHLORIDE 200 MG/1
200 TABLET ORAL DAILY
Qty: 60 TAB | Refills: 0 | Status: SHIPPED
Start: 2021-01-01

## 2021-01-01 RX ORDER — POTASSIUM CHLORIDE 750 MG/1
20 TABLET, FILM COATED, EXTENDED RELEASE ORAL 2 TIMES DAILY
Status: DISCONTINUED | OUTPATIENT
Start: 2021-01-01 | End: 2021-01-01 | Stop reason: HOSPADM

## 2021-01-01 RX ORDER — PANTOPRAZOLE SODIUM 40 MG/1
40 TABLET, DELAYED RELEASE ORAL
Qty: 30 TAB | Refills: 0 | Status: SHIPPED
Start: 2021-01-01

## 2021-01-01 RX ORDER — SODIUM CHLORIDE 9 MG/ML
75 INJECTION, SOLUTION INTRAVENOUS CONTINUOUS
Status: DISCONTINUED | OUTPATIENT
Start: 2021-01-01 | End: 2021-01-01

## 2021-01-01 RX ORDER — AMIODARONE HYDROCHLORIDE 200 MG/1
400 TABLET ORAL 2 TIMES DAILY WITH MEALS
Status: DISCONTINUED | OUTPATIENT
Start: 2021-01-01 | End: 2021-01-01

## 2021-01-01 RX ORDER — METOPROLOL TARTRATE 5 MG/5ML
2.5 INJECTION INTRAVENOUS ONCE
Status: DISCONTINUED | OUTPATIENT
Start: 2021-01-01 | End: 2021-01-01

## 2021-01-01 RX ORDER — PROPOFOL 10 MG/ML
INJECTION, EMULSION INTRAVENOUS
Status: DISCONTINUED | OUTPATIENT
Start: 2021-01-01 | End: 2021-01-01 | Stop reason: HOSPADM

## 2021-01-01 RX ORDER — POTASSIUM CHLORIDE 750 MG/1
20 TABLET, FILM COATED, EXTENDED RELEASE ORAL
Status: COMPLETED | OUTPATIENT
Start: 2021-01-01 | End: 2021-01-01

## 2021-01-01 RX ORDER — SODIUM CHLORIDE 9 MG/ML
50 INJECTION, SOLUTION INTRAVENOUS CONTINUOUS
Status: DISPENSED | OUTPATIENT
Start: 2021-01-01 | End: 2021-01-01

## 2021-01-01 RX ORDER — LANOLIN ALCOHOL/MO/W.PET/CERES
1000 CREAM (GRAM) TOPICAL DAILY
Status: DISCONTINUED | OUTPATIENT
Start: 2021-01-01 | End: 2021-01-01 | Stop reason: SDUPTHER

## 2021-01-01 RX ORDER — DEXTROMETHORPHAN/PSEUDOEPHED 2.5-7.5/.8
1.2 DROPS ORAL
Status: DISCONTINUED | OUTPATIENT
Start: 2021-01-01 | End: 2021-01-01 | Stop reason: HOSPADM

## 2021-01-01 RX ORDER — HYDROCORTISONE 1 %
CREAM (GRAM) TOPICAL
COMMUNITY
End: 2021-01-01

## 2021-01-01 RX ORDER — DILTIAZEM HYDROCHLORIDE 5 MG/ML
10 INJECTION INTRAVENOUS ONCE
Status: DISCONTINUED | OUTPATIENT
Start: 2021-01-01 | End: 2021-01-01

## 2021-01-01 RX ORDER — DILTIAZEM HYDROCHLORIDE 120 MG/1
120 CAPSULE, COATED, EXTENDED RELEASE ORAL DAILY
Status: DISCONTINUED | OUTPATIENT
Start: 2021-01-01 | End: 2021-01-01

## 2021-01-01 RX ORDER — GUAIFENESIN 100 MG/5ML
81 LIQUID (ML) ORAL DAILY
COMMUNITY
End: 2021-01-01

## 2021-01-01 RX ORDER — DIGOXIN 0.25 MG/ML
0.25 INJECTION INTRAMUSCULAR; INTRAVENOUS ONCE
Status: COMPLETED | OUTPATIENT
Start: 2021-01-01 | End: 2021-01-01

## 2021-01-01 RX ORDER — METOPROLOL TARTRATE 50 MG/1
50 TABLET ORAL 2 TIMES DAILY
Qty: 60 TAB | Refills: 0 | Status: SHIPPED
Start: 2021-01-01

## 2021-01-01 RX ORDER — FUROSEMIDE 10 MG/ML
20 INJECTION INTRAMUSCULAR; INTRAVENOUS ONCE
Status: COMPLETED | OUTPATIENT
Start: 2021-01-01 | End: 2021-01-01

## 2021-01-01 RX ORDER — BISACODYL 5 MG
10 TABLET, DELAYED RELEASE (ENTERIC COATED) ORAL
Status: COMPLETED | OUTPATIENT
Start: 2021-01-01 | End: 2021-01-01

## 2021-01-01 RX ORDER — FOLIC ACID 1 MG/1
1 TABLET ORAL DAILY
Status: DISCONTINUED | OUTPATIENT
Start: 2021-01-01 | End: 2021-01-01 | Stop reason: HOSPADM

## 2021-01-01 RX ORDER — MONTELUKAST SODIUM 10 MG/1
10 TABLET ORAL EVERY EVENING
Status: DISCONTINUED | OUTPATIENT
Start: 2021-01-01 | End: 2021-01-01 | Stop reason: HOSPADM

## 2021-01-01 RX ORDER — PREDNISONE 20 MG/1
20 TABLET ORAL
Status: DISCONTINUED | OUTPATIENT
Start: 2021-01-01 | End: 2021-01-01

## 2021-01-01 RX ORDER — ONDANSETRON 4 MG/1
8 TABLET, ORALLY DISINTEGRATING ORAL
Status: DISCONTINUED | OUTPATIENT
Start: 2021-01-01 | End: 2021-01-01 | Stop reason: HOSPADM

## 2021-01-01 RX ORDER — LEVOFLOXACIN 500 MG/1
500 TABLET, FILM COATED ORAL EVERY 24 HOURS
Status: DISCONTINUED | OUTPATIENT
Start: 2021-01-01 | End: 2021-01-01

## 2021-01-01 RX ORDER — LANOLIN ALCOHOL/MO/W.PET/CERES
1 CREAM (GRAM) TOPICAL
Status: DISCONTINUED | OUTPATIENT
Start: 2021-01-01 | End: 2021-01-01 | Stop reason: HOSPADM

## 2021-01-01 RX ORDER — DILTIAZEM HYDROCHLORIDE 360 MG/1
360 CAPSULE, EXTENDED RELEASE ORAL DAILY
Qty: 30 CAP | Refills: 0 | Status: SHIPPED
Start: 2021-01-01 | End: 2021-01-01

## 2021-01-01 RX ORDER — METOPROLOL TARTRATE 5 MG/5ML
2.5 INJECTION INTRAVENOUS EVERY 6 HOURS
Status: DISCONTINUED | OUTPATIENT
Start: 2021-01-01 | End: 2021-01-01

## 2021-01-01 RX ORDER — POTASSIUM CHLORIDE 750 MG/1
40 TABLET, FILM COATED, EXTENDED RELEASE ORAL ONCE
Status: COMPLETED | OUTPATIENT
Start: 2021-01-01 | End: 2021-01-01

## 2021-01-01 RX ORDER — INSULIN GLARGINE 100 [IU]/ML
0.2 INJECTION, SOLUTION SUBCUTANEOUS
Status: DISCONTINUED | OUTPATIENT
Start: 2021-01-01 | End: 2021-01-01

## 2021-01-01 RX ORDER — ONDANSETRON 4 MG/1
8 TABLET, ORALLY DISINTEGRATING ORAL
Status: DISCONTINUED | OUTPATIENT
Start: 2021-01-01 | End: 2021-01-01

## 2021-01-01 RX ORDER — GUAIFENESIN/DEXTROMETHORPHAN 100-10MG/5
5 SYRUP ORAL
Status: DISCONTINUED | OUTPATIENT
Start: 2021-01-01 | End: 2021-01-01 | Stop reason: HOSPADM

## 2021-01-01 RX ORDER — DILTIAZEM HYDROCHLORIDE 180 MG/1
180 CAPSULE, COATED, EXTENDED RELEASE ORAL DAILY
Status: DISCONTINUED | OUTPATIENT
Start: 2021-01-01 | End: 2021-01-01

## 2021-01-01 RX ORDER — DILTIAZEM HYDROCHLORIDE 30 MG/1
30 TABLET, FILM COATED ORAL
Status: DISCONTINUED | OUTPATIENT
Start: 2021-01-01 | End: 2021-01-01

## 2021-01-01 RX ORDER — PROMETHAZINE HYDROCHLORIDE 25 MG/1
12.5 TABLET ORAL
Status: DISCONTINUED | OUTPATIENT
Start: 2021-01-01 | End: 2021-01-01 | Stop reason: HOSPADM

## 2021-01-01 RX ORDER — FUROSEMIDE 10 MG/ML
20 INJECTION INTRAMUSCULAR; INTRAVENOUS
Status: COMPLETED | OUTPATIENT
Start: 2021-01-01 | End: 2021-01-01

## 2021-01-01 RX ORDER — NYSTATIN 100000 [USP'U]/ML
500000 SUSPENSION ORAL 4 TIMES DAILY
Qty: 100 ML | Refills: 0 | Status: SHIPPED
Start: 2021-01-01 | End: 2021-01-01

## 2021-01-01 RX ORDER — FUROSEMIDE 10 MG/ML
40 INJECTION INTRAMUSCULAR; INTRAVENOUS 2 TIMES DAILY
Status: COMPLETED | OUTPATIENT
Start: 2021-01-01 | End: 2021-01-01

## 2021-01-01 RX ORDER — DILTIAZEM HYDROCHLORIDE 300 MG/1
300 CAPSULE, COATED, EXTENDED RELEASE ORAL DAILY
Status: DISCONTINUED | OUTPATIENT
Start: 2021-01-01 | End: 2021-01-01

## 2021-01-01 RX ORDER — AZITHROMYCIN 250 MG/1
500 TABLET, FILM COATED ORAL EVERY EVENING
Status: COMPLETED | OUTPATIENT
Start: 2021-01-01 | End: 2021-01-01

## 2021-01-01 RX ORDER — LOPERAMIDE HYDROCHLORIDE 2 MG/1
2 CAPSULE ORAL 2 TIMES DAILY
Status: DISCONTINUED | OUTPATIENT
Start: 2021-01-01 | End: 2021-01-01 | Stop reason: HOSPADM

## 2021-01-01 RX ORDER — PROPOFOL 10 MG/ML
INJECTION, EMULSION INTRAVENOUS AS NEEDED
Status: DISCONTINUED | OUTPATIENT
Start: 2021-01-01 | End: 2021-01-01 | Stop reason: HOSPADM

## 2021-01-01 RX ORDER — MONTELUKAST SODIUM 10 MG/1
10 TABLET ORAL
Status: DISCONTINUED | OUTPATIENT
Start: 2021-01-01 | End: 2021-01-01 | Stop reason: HOSPADM

## 2021-01-01 RX ORDER — MAGNESIUM CITRATE
296 SOLUTION, ORAL ORAL 2 TIMES DAILY
Status: DISPENSED | OUTPATIENT
Start: 2021-01-01 | End: 2021-01-01

## 2021-01-01 RX ORDER — INSULIN LISPRO 100 [IU]/ML
INJECTION, SOLUTION INTRAVENOUS; SUBCUTANEOUS EVERY 6 HOURS
Status: DISCONTINUED | OUTPATIENT
Start: 2021-01-01 | End: 2021-01-01

## 2021-01-01 RX ORDER — FUROSEMIDE 20 MG/1
20 TABLET ORAL DAILY
COMMUNITY
End: 2021-01-01

## 2021-01-01 RX ORDER — METOPROLOL TARTRATE 25 MG/1
12.5 TABLET, FILM COATED ORAL ONCE
Status: COMPLETED | OUTPATIENT
Start: 2021-01-01 | End: 2021-01-01

## 2021-01-01 RX ORDER — METOPROLOL TARTRATE 25 MG/1
25 TABLET, FILM COATED ORAL
Status: DISCONTINUED | OUTPATIENT
Start: 2021-01-01 | End: 2021-01-01

## 2021-01-01 RX ORDER — POTASSIUM CHLORIDE 750 MG/1
40 TABLET, FILM COATED, EXTENDED RELEASE ORAL
Status: COMPLETED | OUTPATIENT
Start: 2021-01-01 | End: 2021-01-01

## 2021-01-01 RX ORDER — DILTIAZEM HYDROCHLORIDE 300 MG/1
300 CAPSULE, COATED, EXTENDED RELEASE ORAL DAILY
Qty: 30 CAP | Refills: 0 | Status: SHIPPED
Start: 2021-01-01 | End: 2021-01-01

## 2021-01-01 RX ORDER — AMIODARONE HYDROCHLORIDE 400 MG/1
400 TABLET ORAL 2 TIMES DAILY
Qty: 60 TAB | Refills: 0 | Status: SHIPPED
Start: 2021-01-01 | End: 2021-01-01

## 2021-01-01 RX ORDER — ONDANSETRON 8 MG/1
8 TABLET, ORALLY DISINTEGRATING ORAL
Qty: 30 TAB | Refills: 0 | Status: SHIPPED
Start: 2021-01-01

## 2021-01-01 RX ORDER — BISACODYL 5 MG
10 TABLET, DELAYED RELEASE (ENTERIC COATED) ORAL
Status: DISPENSED | OUTPATIENT
Start: 2021-01-01 | End: 2021-01-01

## 2021-01-01 RX ORDER — FUROSEMIDE 40 MG/1
40 TABLET ORAL DAILY
Status: DISCONTINUED | OUTPATIENT
Start: 2021-01-01 | End: 2021-01-01

## 2021-01-01 RX ORDER — POLYETHYLENE GLYCOL 3350 17 G/17G
17 POWDER, FOR SOLUTION ORAL
COMMUNITY
Start: 2021-01-01

## 2021-01-01 RX ORDER — INSULIN GLARGINE 100 [IU]/ML
18 INJECTION, SOLUTION SUBCUTANEOUS
Status: DISCONTINUED | OUTPATIENT
Start: 2021-01-01 | End: 2021-01-01 | Stop reason: HOSPADM

## 2021-01-01 RX ORDER — AMIODARONE HYDROCHLORIDE 200 MG/1
400 TABLET ORAL 2 TIMES DAILY
COMMUNITY
End: 2021-01-01

## 2021-01-01 RX ORDER — PREDNISONE 5 MG/1
10 TABLET ORAL ONCE
Status: COMPLETED | OUTPATIENT
Start: 2021-01-01 | End: 2021-01-01

## 2021-01-01 RX ADMIN — MIRTAZAPINE 15 MG: 15 TABLET, FILM COATED ORAL at 22:01

## 2021-01-01 RX ADMIN — METOPROLOL TARTRATE 2.5 MG: 5 INJECTION INTRAVENOUS at 10:10

## 2021-01-01 RX ADMIN — IPRATROPIUM BROMIDE AND ALBUTEROL SULFATE 3 ML: .5; 2.5 SOLUTION RESPIRATORY (INHALATION) at 13:12

## 2021-01-01 RX ADMIN — IRON SUCROSE 200 MG: 20 INJECTION, SOLUTION INTRAVENOUS at 12:35

## 2021-01-01 RX ADMIN — DILTIAZEM HYDROCHLORIDE 300 MG: 180 CAPSULE, EXTENDED RELEASE ORAL at 08:42

## 2021-01-01 RX ADMIN — NYSTATIN 500000 UNITS: 100000 SUSPENSION ORAL at 13:19

## 2021-01-01 RX ADMIN — APIXABAN 5 MG: 5 TABLET, FILM COATED ORAL at 10:21

## 2021-01-01 RX ADMIN — NYSTATIN 500000 UNITS: 100000 SUSPENSION ORAL at 08:11

## 2021-01-01 RX ADMIN — CEFTRIAXONE 1 G: 1 INJECTION, POWDER, FOR SOLUTION INTRAMUSCULAR; INTRAVENOUS at 17:01

## 2021-01-01 RX ADMIN — IPRATROPIUM BROMIDE 0.5 MG: 0.5 SOLUTION RESPIRATORY (INHALATION) at 20:28

## 2021-01-01 RX ADMIN — NYSTATIN 500000 UNITS: 100000 SUSPENSION ORAL at 13:56

## 2021-01-01 RX ADMIN — DULOXETINE HYDROCHLORIDE 60 MG: 30 CAPSULE, DELAYED RELEASE ORAL at 09:33

## 2021-01-01 RX ADMIN — DILTIAZEM HYDROCHLORIDE 240 MG: 120 CAPSULE, COATED, EXTENDED RELEASE ORAL at 09:08

## 2021-01-01 RX ADMIN — ARFORMOTEROL TARTRATE 15 MCG: 15 SOLUTION RESPIRATORY (INHALATION) at 20:22

## 2021-01-01 RX ADMIN — PANTOPRAZOLE SODIUM 40 MG: 40 TABLET, DELAYED RELEASE ORAL at 16:49

## 2021-01-01 RX ADMIN — DILTIAZEM HYDROCHLORIDE 60 MG: 30 TABLET, FILM COATED ORAL at 17:26

## 2021-01-01 RX ADMIN — FUROSEMIDE 40 MG: 10 INJECTION, SOLUTION INTRAMUSCULAR; INTRAVENOUS at 10:20

## 2021-01-01 RX ADMIN — ARFORMOTEROL TARTRATE 15 MCG: 15 SOLUTION RESPIRATORY (INHALATION) at 08:51

## 2021-01-01 RX ADMIN — PANCRELIPASE 1 CAPSULE: 24000; 76000; 120000 CAPSULE, DELAYED RELEASE PELLETS ORAL at 09:09

## 2021-01-01 RX ADMIN — Medication 10 ML: at 12:45

## 2021-01-01 RX ADMIN — LOPERAMIDE HYDROCHLORIDE 2 MG: 2 CAPSULE ORAL at 08:24

## 2021-01-01 RX ADMIN — PANCRELIPASE 2 CAPSULE: 24000; 76000; 120000 CAPSULE, DELAYED RELEASE PELLETS ORAL at 13:28

## 2021-01-01 RX ADMIN — PREDNISONE 20 MG: 20 TABLET ORAL at 09:08

## 2021-01-01 RX ADMIN — METOPROLOL TARTRATE 25 MG: 25 TABLET, FILM COATED ORAL at 20:13

## 2021-01-01 RX ADMIN — Medication 10 ML: at 13:55

## 2021-01-01 RX ADMIN — APIXABAN 5 MG: 5 TABLET, FILM COATED ORAL at 10:36

## 2021-01-01 RX ADMIN — MIRTAZAPINE 15 MG: 15 TABLET, FILM COATED ORAL at 22:29

## 2021-01-01 RX ADMIN — INSULIN LISPRO 2 UNITS: 100 INJECTION, SOLUTION INTRAVENOUS; SUBCUTANEOUS at 12:50

## 2021-01-01 RX ADMIN — NYSTATIN 500000 UNITS: 100000 SUSPENSION ORAL at 17:27

## 2021-01-01 RX ADMIN — CYANOCOBALAMIN 1000 MCG: 1000 INJECTION, SOLUTION INTRAMUSCULAR; SUBCUTANEOUS at 15:50

## 2021-01-01 RX ADMIN — BISACODYL 10 MG: 5 TABLET, COATED ORAL at 18:45

## 2021-01-01 RX ADMIN — MEROPENEM 500 MG: 500 INJECTION, POWDER, FOR SOLUTION INTRAVENOUS at 03:24

## 2021-01-01 RX ADMIN — ACETAMINOPHEN 650 MG: 325 TABLET ORAL at 22:07

## 2021-01-01 RX ADMIN — Medication 10 ML: at 13:12

## 2021-01-01 RX ADMIN — Medication 10 ML: at 16:51

## 2021-01-01 RX ADMIN — BUDESONIDE 500 MCG: 0.5 INHALANT RESPIRATORY (INHALATION) at 12:11

## 2021-01-01 RX ADMIN — PANCRELIPASE 1 CAPSULE: 24000; 76000; 120000 CAPSULE, DELAYED RELEASE PELLETS ORAL at 18:02

## 2021-01-01 RX ADMIN — LOPERAMIDE HYDROCHLORIDE 2 MG: 2 CAPSULE ORAL at 10:05

## 2021-01-01 RX ADMIN — Medication 2 TABLET: at 09:12

## 2021-01-01 RX ADMIN — IPRATROPIUM BROMIDE AND ALBUTEROL SULFATE 3 ML: .5; 2.5 SOLUTION RESPIRATORY (INHALATION) at 20:15

## 2021-01-01 RX ADMIN — DIBASIC SODIUM PHOSPHATE, MONOBASIC POTASSIUM PHOSPHATE AND MONOBASIC SODIUM PHOSPHATE 1 TABLET: 852; 155; 130 TABLET ORAL at 17:39

## 2021-01-01 RX ADMIN — DIBASIC SODIUM PHOSPHATE, MONOBASIC POTASSIUM PHOSPHATE AND MONOBASIC SODIUM PHOSPHATE 1 TABLET: 852; 155; 130 TABLET ORAL at 08:23

## 2021-01-01 RX ADMIN — BUDESONIDE 500 MCG: 0.5 INHALANT RESPIRATORY (INHALATION) at 18:28

## 2021-01-01 RX ADMIN — NYSTATIN 500000 UNITS: 100000 SUSPENSION ORAL at 17:41

## 2021-01-01 RX ADMIN — POTASSIUM CHLORIDE 20 MEQ: 750 TABLET, FILM COATED, EXTENDED RELEASE ORAL at 08:43

## 2021-01-01 RX ADMIN — LOPERAMIDE HYDROCHLORIDE 2 MG: 2 CAPSULE ORAL at 17:54

## 2021-01-01 RX ADMIN — MONTELUKAST SODIUM 10 MG: 10 TABLET, FILM COATED ORAL at 21:18

## 2021-01-01 RX ADMIN — MONTELUKAST 10 MG: 10 TABLET, FILM COATED ORAL at 22:05

## 2021-01-01 RX ADMIN — NYSTATIN 500000 UNITS: 100000 SUSPENSION ORAL at 23:40

## 2021-01-01 RX ADMIN — MONTELUKAST SODIUM 10 MG: 10 TABLET, FILM COATED ORAL at 21:29

## 2021-01-01 RX ADMIN — NYSTATIN 500000 UNITS: 100000 SUSPENSION ORAL at 09:11

## 2021-01-01 RX ADMIN — Medication 2000 UNITS: at 09:28

## 2021-01-01 RX ADMIN — PREDNISONE 20 MG: 20 TABLET ORAL at 16:54

## 2021-01-01 RX ADMIN — IPRATROPIUM BROMIDE AND ALBUTEROL SULFATE 3 ML: .5; 2.5 SOLUTION RESPIRATORY (INHALATION) at 14:56

## 2021-01-01 RX ADMIN — PREDNISONE 20 MG: 20 TABLET ORAL at 08:33

## 2021-01-01 RX ADMIN — PANCRELIPASE 2 CAPSULE: 24000; 76000; 120000 CAPSULE, DELAYED RELEASE PELLETS ORAL at 17:27

## 2021-01-01 RX ADMIN — Medication 10 ML: at 05:17

## 2021-01-01 RX ADMIN — METOPROLOL TARTRATE 25 MG: 25 TABLET, FILM COATED ORAL at 09:12

## 2021-01-01 RX ADMIN — Medication 10 ML: at 06:13

## 2021-01-01 RX ADMIN — AZITHROMYCIN MONOHYDRATE 500 MG: 250 TABLET ORAL at 17:03

## 2021-01-01 RX ADMIN — Medication 10 ML: at 21:29

## 2021-01-01 RX ADMIN — MIRTAZAPINE 15 MG: 15 TABLET, FILM COATED ORAL at 21:15

## 2021-01-01 RX ADMIN — LOPERAMIDE HYDROCHLORIDE 2 MG: 2 CAPSULE ORAL at 13:19

## 2021-01-01 RX ADMIN — Medication 2000 UNITS: at 09:33

## 2021-01-01 RX ADMIN — AMIODARONE HYDROCHLORIDE 200 MG: 200 TABLET ORAL at 08:44

## 2021-01-01 RX ADMIN — INSULIN LISPRO 3 UNITS: 100 INJECTION, SOLUTION INTRAVENOUS; SUBCUTANEOUS at 16:29

## 2021-01-01 RX ADMIN — DIBASIC SODIUM PHOSPHATE, MONOBASIC POTASSIUM PHOSPHATE AND MONOBASIC SODIUM PHOSPHATE 1 TABLET: 852; 155; 130 TABLET ORAL at 10:05

## 2021-01-01 RX ADMIN — AMIODARONE HYDROCHLORIDE 400 MG: 200 TABLET ORAL at 22:05

## 2021-01-01 RX ADMIN — PANCRELIPASE 2 CAPSULE: 24000; 76000; 120000 CAPSULE, DELAYED RELEASE PELLETS ORAL at 08:56

## 2021-01-01 RX ADMIN — LOPERAMIDE HYDROCHLORIDE 2 MG: 2 CAPSULE ORAL at 18:31

## 2021-01-01 RX ADMIN — IPRATROPIUM BROMIDE AND ALBUTEROL 1 PUFF: 20; 100 SPRAY, METERED RESPIRATORY (INHALATION) at 16:17

## 2021-01-01 RX ADMIN — BUDESONIDE 500 MCG: 0.5 INHALANT RESPIRATORY (INHALATION) at 07:22

## 2021-01-01 RX ADMIN — INSULIN LISPRO 4 UNITS: 100 INJECTION, SOLUTION INTRAVENOUS; SUBCUTANEOUS at 17:04

## 2021-01-01 RX ADMIN — POTASSIUM CHLORIDE 20 MEQ: 750 TABLET, FILM COATED, EXTENDED RELEASE ORAL at 08:14

## 2021-01-01 RX ADMIN — PREDNISONE 20 MG: 20 TABLET ORAL at 09:11

## 2021-01-01 RX ADMIN — IPRATROPIUM BROMIDE AND ALBUTEROL 1 PUFF: 20; 100 SPRAY, METERED RESPIRATORY (INHALATION) at 12:21

## 2021-01-01 RX ADMIN — NYSTATIN 500000 UNITS: 100000 SUSPENSION ORAL at 12:45

## 2021-01-01 RX ADMIN — Medication 2 TABLET: at 08:13

## 2021-01-01 RX ADMIN — INSULIN LISPRO 2 UNITS: 100 INJECTION, SOLUTION INTRAVENOUS; SUBCUTANEOUS at 17:53

## 2021-01-01 RX ADMIN — DULOXETINE HYDROCHLORIDE 60 MG: 30 CAPSULE, DELAYED RELEASE ORAL at 10:20

## 2021-01-01 RX ADMIN — CEFTRIAXONE 1 G: 1 INJECTION, POWDER, FOR SOLUTION INTRAMUSCULAR; INTRAVENOUS at 17:03

## 2021-01-01 RX ADMIN — SODIUM CHLORIDE 50 ML/HR: 9 INJECTION, SOLUTION INTRAVENOUS at 08:33

## 2021-01-01 RX ADMIN — DILTIAZEM HYDROCHLORIDE 300 MG: 180 CAPSULE, EXTENDED RELEASE ORAL at 09:33

## 2021-01-01 RX ADMIN — IPRATROPIUM BROMIDE AND ALBUTEROL 1 PUFF: 20; 100 SPRAY, METERED RESPIRATORY (INHALATION) at 04:04

## 2021-01-01 RX ADMIN — LOPERAMIDE HYDROCHLORIDE 2 MG: 2 CAPSULE ORAL at 18:02

## 2021-01-01 RX ADMIN — INSULIN GLARGINE 23 UNITS: 100 INJECTION, SOLUTION SUBCUTANEOUS at 22:36

## 2021-01-01 RX ADMIN — APIXABAN 5 MG: 5 TABLET, FILM COATED ORAL at 22:01

## 2021-01-01 RX ADMIN — IPRATROPIUM BROMIDE AND ALBUTEROL 1 PUFF: 20; 100 SPRAY, METERED RESPIRATORY (INHALATION) at 12:40

## 2021-01-01 RX ADMIN — Medication 10 ML: at 12:27

## 2021-01-01 RX ADMIN — POTASSIUM CHLORIDE 20 MEQ: 750 TABLET, FILM COATED, EXTENDED RELEASE ORAL at 09:08

## 2021-01-01 RX ADMIN — Medication 10 ML: at 17:40

## 2021-01-01 RX ADMIN — PANCRELIPASE 1 CAPSULE: 24000; 76000; 120000 CAPSULE, DELAYED RELEASE PELLETS ORAL at 12:00

## 2021-01-01 RX ADMIN — PREDNISONE 20 MG: 20 TABLET ORAL at 16:49

## 2021-01-01 RX ADMIN — POTASSIUM CHLORIDE 20 MEQ: 750 TABLET, FILM COATED, EXTENDED RELEASE ORAL at 17:26

## 2021-01-01 RX ADMIN — INSULIN GLARGINE 23 UNITS: 100 INJECTION, SOLUTION SUBCUTANEOUS at 21:59

## 2021-01-01 RX ADMIN — FUROSEMIDE 40 MG: 10 INJECTION, SOLUTION INTRAVENOUS at 08:35

## 2021-01-01 RX ADMIN — DILTIAZEM HYDROCHLORIDE 180 MG: 180 CAPSULE, COATED, EXTENDED RELEASE ORAL at 08:56

## 2021-01-01 RX ADMIN — DIBASIC SODIUM PHOSPHATE, MONOBASIC POTASSIUM PHOSPHATE AND MONOBASIC SODIUM PHOSPHATE 1 TABLET: 852; 155; 130 TABLET ORAL at 18:33

## 2021-01-01 RX ADMIN — NYSTATIN 500000 UNITS: 100000 SUSPENSION ORAL at 17:53

## 2021-01-01 RX ADMIN — ARFORMOTEROL TARTRATE 15 MCG: 15 SOLUTION RESPIRATORY (INHALATION) at 07:47

## 2021-01-01 RX ADMIN — FUROSEMIDE 40 MG: 40 TABLET ORAL at 08:14

## 2021-01-01 RX ADMIN — AMIODARONE HYDROCHLORIDE 1 MG/MIN: 50 INJECTION, SOLUTION INTRAVENOUS at 02:35

## 2021-01-01 RX ADMIN — IPRATROPIUM BROMIDE AND ALBUTEROL 1 PUFF: 20; 100 SPRAY, METERED RESPIRATORY (INHALATION) at 01:17

## 2021-01-01 RX ADMIN — DILTIAZEM HYDROCHLORIDE 300 MG: 180 CAPSULE, EXTENDED RELEASE ORAL at 08:43

## 2021-01-01 RX ADMIN — IPRATROPIUM BROMIDE AND ALBUTEROL SULFATE 3 ML: .5; 2.5 SOLUTION RESPIRATORY (INHALATION) at 07:27

## 2021-01-01 RX ADMIN — DULOXETINE HYDROCHLORIDE 60 MG: 30 CAPSULE, DELAYED RELEASE ORAL at 09:04

## 2021-01-01 RX ADMIN — Medication 5 ML: at 14:00

## 2021-01-01 RX ADMIN — NYSTATIN 500000 UNITS: 100000 SUSPENSION ORAL at 22:01

## 2021-01-01 RX ADMIN — POTASSIUM CHLORIDE 20 MEQ: 750 TABLET, FILM COATED, EXTENDED RELEASE ORAL at 17:06

## 2021-01-01 RX ADMIN — PHENYLEPHRINE HYDROCHLORIDE 110 MCG/MIN: 10 INJECTION INTRAVENOUS at 22:44

## 2021-01-01 RX ADMIN — DILTIAZEM HYDROCHLORIDE 60 MG: 30 TABLET, FILM COATED ORAL at 12:02

## 2021-01-01 RX ADMIN — DILTIAZEM HYDROCHLORIDE 240 MG: 240 CAPSULE, COATED, EXTENDED RELEASE ORAL at 09:12

## 2021-01-01 RX ADMIN — MONTELUKAST 10 MG: 10 TABLET, FILM COATED ORAL at 21:38

## 2021-01-01 RX ADMIN — Medication 10 ML: at 13:51

## 2021-01-01 RX ADMIN — PANTOPRAZOLE SODIUM 40 MG: 40 TABLET, DELAYED RELEASE ORAL at 07:21

## 2021-01-01 RX ADMIN — SODIUM CHLORIDE 75 ML/HR: 9 INJECTION, SOLUTION INTRAVENOUS at 17:50

## 2021-01-01 RX ADMIN — MEROPENEM 500 MG: 500 INJECTION, POWDER, FOR SOLUTION INTRAVENOUS at 11:13

## 2021-01-01 RX ADMIN — ARFORMOTEROL TARTRATE 15 MCG: 15 SOLUTION RESPIRATORY (INHALATION) at 11:37

## 2021-01-01 RX ADMIN — SODIUM CHLORIDE 40 MG: 9 INJECTION INTRAMUSCULAR; INTRAVENOUS; SUBCUTANEOUS at 21:29

## 2021-01-01 RX ADMIN — ARFORMOTEROL TARTRATE 15 MCG: 15 SOLUTION RESPIRATORY (INHALATION) at 19:57

## 2021-01-01 RX ADMIN — MONTELUKAST 10 MG: 10 TABLET, FILM COATED ORAL at 17:00

## 2021-01-01 RX ADMIN — IPRATROPIUM BROMIDE AND ALBUTEROL SULFATE 3 ML: .5; 2.5 SOLUTION RESPIRATORY (INHALATION) at 07:10

## 2021-01-01 RX ADMIN — NYSTATIN 500000 UNITS: 100000 SUSPENSION ORAL at 17:14

## 2021-01-01 RX ADMIN — MIRTAZAPINE 15 MG: 15 TABLET, FILM COATED ORAL at 22:12

## 2021-01-01 RX ADMIN — PREDNISONE 20 MG: 20 TABLET ORAL at 08:56

## 2021-01-01 RX ADMIN — Medication 10 ML: at 22:12

## 2021-01-01 RX ADMIN — CYANOCOBALAMIN 1000 MCG: 1000 INJECTION, SOLUTION INTRAMUSCULAR; SUBCUTANEOUS at 17:25

## 2021-01-01 RX ADMIN — PANCRELIPASE 1 CAPSULE: 24000; 76000; 120000 CAPSULE, DELAYED RELEASE PELLETS ORAL at 17:46

## 2021-01-01 RX ADMIN — MEROPENEM 500 MG: 500 INJECTION, POWDER, FOR SOLUTION INTRAVENOUS at 21:25

## 2021-01-01 RX ADMIN — AMIODARONE HYDROCHLORIDE 400 MG: 200 TABLET ORAL at 12:00

## 2021-01-01 RX ADMIN — DULOXETINE HYDROCHLORIDE 60 MG: 30 CAPSULE, DELAYED RELEASE ORAL at 08:12

## 2021-01-01 RX ADMIN — DIGOXIN 0.12 MG: 125 TABLET ORAL at 21:05

## 2021-01-01 RX ADMIN — AMIODARONE HYDROCHLORIDE 400 MG: 200 TABLET ORAL at 16:53

## 2021-01-01 RX ADMIN — IPRATROPIUM BROMIDE 0.5 MG: 0.5 SOLUTION RESPIRATORY (INHALATION) at 19:30

## 2021-01-01 RX ADMIN — PREDNISONE 20 MG: 20 TABLET ORAL at 08:35

## 2021-01-01 RX ADMIN — IPRATROPIUM BROMIDE 0.5 MG: 0.5 SOLUTION RESPIRATORY (INHALATION) at 15:11

## 2021-01-01 RX ADMIN — Medication 10 ML: at 22:46

## 2021-01-01 RX ADMIN — FUROSEMIDE 40 MG: 40 TABLET ORAL at 08:57

## 2021-01-01 RX ADMIN — DILTIAZEM HYDROCHLORIDE 10 MG: 5 INJECTION INTRAVENOUS at 07:47

## 2021-01-01 RX ADMIN — IPRATROPIUM BROMIDE AND ALBUTEROL SULFATE 3 ML: .5; 2.5 SOLUTION RESPIRATORY (INHALATION) at 15:29

## 2021-01-01 RX ADMIN — INSULIN LISPRO 2 UNITS: 100 INJECTION, SOLUTION INTRAVENOUS; SUBCUTANEOUS at 22:41

## 2021-01-01 RX ADMIN — APIXABAN 5 MG: 5 TABLET, FILM COATED ORAL at 21:29

## 2021-01-01 RX ADMIN — PANCRELIPASE 1 CAPSULE: 24000; 76000; 120000 CAPSULE, DELAYED RELEASE PELLETS ORAL at 17:54

## 2021-01-01 RX ADMIN — PANCRELIPASE 2 CAPSULE: 24000; 76000; 120000 CAPSULE, DELAYED RELEASE PELLETS ORAL at 12:58

## 2021-01-01 RX ADMIN — PREDNISONE 20 MG: 20 TABLET ORAL at 08:14

## 2021-01-01 RX ADMIN — IPRATROPIUM BROMIDE AND ALBUTEROL SULFATE 3 ML: .5; 2.5 SOLUTION RESPIRATORY (INHALATION) at 13:44

## 2021-01-01 RX ADMIN — INSULIN LISPRO 4 UNITS: 100 INJECTION, SOLUTION INTRAVENOUS; SUBCUTANEOUS at 17:15

## 2021-01-01 RX ADMIN — PREDNISONE 20 MG: 20 TABLET ORAL at 08:12

## 2021-01-01 RX ADMIN — CYANOCOBALAMIN TAB 500 MCG 1000 MCG: 500 TAB at 08:44

## 2021-01-01 RX ADMIN — Medication 2 TABLET: at 10:05

## 2021-01-01 RX ADMIN — AMIODARONE HYDROCHLORIDE 200 MG: 200 TABLET ORAL at 08:43

## 2021-01-01 RX ADMIN — CYANOCOBALAMIN TAB 500 MCG 1000 MCG: 500 TAB at 09:12

## 2021-01-01 RX ADMIN — ARFORMOTEROL TARTRATE 15 MCG: 15 SOLUTION RESPIRATORY (INHALATION) at 08:00

## 2021-01-01 RX ADMIN — PANCRELIPASE 2 CAPSULE: 24000; 76000; 120000 CAPSULE, DELAYED RELEASE PELLETS ORAL at 12:25

## 2021-01-01 RX ADMIN — DIGOXIN 250 MCG: 0.25 INJECTION INTRAMUSCULAR; INTRAVENOUS at 11:30

## 2021-01-01 RX ADMIN — PANCRELIPASE 1 CAPSULE: 24000; 76000; 120000 CAPSULE, DELAYED RELEASE PELLETS ORAL at 17:35

## 2021-01-01 RX ADMIN — Medication 10 ML: at 17:57

## 2021-01-01 RX ADMIN — Medication 10 ML: at 16:09

## 2021-01-01 RX ADMIN — LOPERAMIDE HYDROCHLORIDE 2 MG: 2 CAPSULE ORAL at 17:38

## 2021-01-01 RX ADMIN — METOPROLOL TARTRATE 25 MG: 25 TABLET, FILM COATED ORAL at 16:22

## 2021-01-01 RX ADMIN — PREDNISONE 10 MG: 20 TABLET ORAL at 09:00

## 2021-01-01 RX ADMIN — Medication 2 TABLET: at 09:05

## 2021-01-01 RX ADMIN — Medication 10 ML: at 17:03

## 2021-01-01 RX ADMIN — INSULIN GLARGINE 18 UNITS: 100 INJECTION, SOLUTION SUBCUTANEOUS at 22:09

## 2021-01-01 RX ADMIN — SODIUM CHLORIDE 40 MG: 9 INJECTION INTRAMUSCULAR; INTRAVENOUS; SUBCUTANEOUS at 09:29

## 2021-01-01 RX ADMIN — PREDNISONE 20 MG: 20 TABLET ORAL at 09:07

## 2021-01-01 RX ADMIN — POTASSIUM CHLORIDE 20 MEQ: 750 TABLET, FILM COATED, EXTENDED RELEASE ORAL at 08:55

## 2021-01-01 RX ADMIN — NYSTATIN 500000 UNITS: 100000 SUSPENSION ORAL at 08:35

## 2021-01-01 RX ADMIN — INSULIN LISPRO 3 UNITS: 100 INJECTION, SOLUTION INTRAVENOUS; SUBCUTANEOUS at 16:30

## 2021-01-01 RX ADMIN — DIBASIC SODIUM PHOSPHATE, MONOBASIC POTASSIUM PHOSPHATE AND MONOBASIC SODIUM PHOSPHATE 1 TABLET: 852; 155; 130 TABLET ORAL at 18:02

## 2021-01-01 RX ADMIN — IPRATROPIUM BROMIDE AND ALBUTEROL SULFATE 3 ML: .5; 2.5 SOLUTION RESPIRATORY (INHALATION) at 15:21

## 2021-01-01 RX ADMIN — Medication 10 ML: at 22:01

## 2021-01-01 RX ADMIN — DEXTROSE MONOHYDRATE 150 MG: 5 INJECTION, SOLUTION INTRAVENOUS at 09:21

## 2021-01-01 RX ADMIN — ARFORMOTEROL TARTRATE 15 MCG: 15 SOLUTION RESPIRATORY (INHALATION) at 07:56

## 2021-01-01 RX ADMIN — Medication 10 ML: at 04:38

## 2021-01-01 RX ADMIN — POTASSIUM CHLORIDE 40 MEQ: 750 TABLET, FILM COATED, EXTENDED RELEASE ORAL at 16:00

## 2021-01-01 RX ADMIN — NYSTATIN 500000 UNITS: 100000 SUSPENSION ORAL at 12:13

## 2021-01-01 RX ADMIN — PANCRELIPASE 2 CAPSULE: 24000; 76000; 120000 CAPSULE, DELAYED RELEASE PELLETS ORAL at 09:03

## 2021-01-01 RX ADMIN — CYANOCOBALAMIN TAB 500 MCG 1000 MCG: 500 TAB at 09:09

## 2021-01-01 RX ADMIN — DIBASIC SODIUM PHOSPHATE, MONOBASIC POTASSIUM PHOSPHATE AND MONOBASIC SODIUM PHOSPHATE 1 TABLET: 852; 155; 130 TABLET ORAL at 08:07

## 2021-01-01 RX ADMIN — MIRTAZAPINE 15 MG: 15 TABLET, FILM COATED ORAL at 22:03

## 2021-01-01 RX ADMIN — ACETAMINOPHEN 650 MG: 325 TABLET ORAL at 21:52

## 2021-01-01 RX ADMIN — MEROPENEM 500 MG: 500 INJECTION, POWDER, FOR SOLUTION INTRAVENOUS at 04:37

## 2021-01-01 RX ADMIN — AMIODARONE HYDROCHLORIDE 200 MG: 200 TABLET ORAL at 09:29

## 2021-01-01 RX ADMIN — NYSTATIN 500000 UNITS: 100000 SUSPENSION ORAL at 17:51

## 2021-01-01 RX ADMIN — CYANOCOBALAMIN TAB 500 MCG 1000 MCG: 500 TAB at 09:04

## 2021-01-01 RX ADMIN — Medication 2 TABLET: at 08:56

## 2021-01-01 RX ADMIN — AMIODARONE HYDROCHLORIDE 0.5 MG/MIN: 50 INJECTION, SOLUTION INTRAVENOUS at 05:49

## 2021-01-01 RX ADMIN — MIRTAZAPINE 15 MG: 15 TABLET, FILM COATED ORAL at 22:42

## 2021-01-01 RX ADMIN — Medication 10 ML: at 20:20

## 2021-01-01 RX ADMIN — FUROSEMIDE 20 MG: 10 INJECTION, SOLUTION INTRAMUSCULAR; INTRAVENOUS at 10:14

## 2021-01-01 RX ADMIN — IPRATROPIUM BROMIDE AND ALBUTEROL 1 PUFF: 20; 100 SPRAY, METERED RESPIRATORY (INHALATION) at 21:27

## 2021-01-01 RX ADMIN — PANCRELIPASE 2 CAPSULE: 24000; 76000; 120000 CAPSULE, DELAYED RELEASE PELLETS ORAL at 09:24

## 2021-01-01 RX ADMIN — MONTELUKAST 10 MG: 10 TABLET, FILM COATED ORAL at 18:39

## 2021-01-01 RX ADMIN — FERROUS SULFATE TAB 325 MG (65 MG ELEMENTAL FE) 325 MG: 325 (65 FE) TAB at 09:11

## 2021-01-01 RX ADMIN — CYANOCOBALAMIN TAB 500 MCG 1000 MCG: 500 TAB at 08:24

## 2021-01-01 RX ADMIN — BUDESONIDE 500 MCG: 0.5 INHALANT RESPIRATORY (INHALATION) at 07:12

## 2021-01-01 RX ADMIN — ARFORMOTEROL TARTRATE 15 MCG: 15 SOLUTION RESPIRATORY (INHALATION) at 07:44

## 2021-01-01 RX ADMIN — NYSTATIN 500000 UNITS: 100000 SUSPENSION ORAL at 17:35

## 2021-01-01 RX ADMIN — CEFTRIAXONE 1 G: 1 INJECTION, POWDER, FOR SOLUTION INTRAMUSCULAR; INTRAVENOUS at 19:08

## 2021-01-01 RX ADMIN — POTASSIUM CHLORIDE 10 MEQ: 750 TABLET, FILM COATED, EXTENDED RELEASE ORAL at 09:10

## 2021-01-01 RX ADMIN — NYSTATIN 500000 UNITS: 100000 SUSPENSION ORAL at 21:58

## 2021-01-01 RX ADMIN — MIRTAZAPINE 15 MG: 15 TABLET, FILM COATED ORAL at 21:58

## 2021-01-01 RX ADMIN — PANCRELIPASE 2 CAPSULE: 24000; 76000; 120000 CAPSULE, DELAYED RELEASE PELLETS ORAL at 09:28

## 2021-01-01 RX ADMIN — IPRATROPIUM BROMIDE AND ALBUTEROL 1 PUFF: 20; 100 SPRAY, METERED RESPIRATORY (INHALATION) at 01:13

## 2021-01-01 RX ADMIN — SODIUM CHLORIDE 40 MG: 9 INJECTION INTRAMUSCULAR; INTRAVENOUS; SUBCUTANEOUS at 22:12

## 2021-01-01 RX ADMIN — ARFORMOTEROL TARTRATE 15 MCG: 15 SOLUTION RESPIRATORY (INHALATION) at 20:39

## 2021-01-01 RX ADMIN — IPRATROPIUM BROMIDE AND ALBUTEROL 1 PUFF: 20; 100 SPRAY, METERED RESPIRATORY (INHALATION) at 23:38

## 2021-01-01 RX ADMIN — IPRATROPIUM BROMIDE 0.5 MG: 0.5 SOLUTION RESPIRATORY (INHALATION) at 11:29

## 2021-01-01 RX ADMIN — NYSTATIN 500000 UNITS: 100000 SUSPENSION ORAL at 21:38

## 2021-01-01 RX ADMIN — NYSTATIN 500000 UNITS: 100000 SUSPENSION ORAL at 17:21

## 2021-01-01 RX ADMIN — NYSTATIN 500000 UNITS: 100000 SUSPENSION ORAL at 10:05

## 2021-01-01 RX ADMIN — PANCRELIPASE 1 CAPSULE: 24000; 76000; 120000 CAPSULE, DELAYED RELEASE PELLETS ORAL at 10:05

## 2021-01-01 RX ADMIN — PROPOFOL 50 MG: 10 INJECTION, EMULSION INTRAVENOUS at 09:00

## 2021-01-01 RX ADMIN — Medication 10 ML: at 08:01

## 2021-01-01 RX ADMIN — PANTOPRAZOLE SODIUM 40 MG: 40 TABLET, DELAYED RELEASE ORAL at 06:03

## 2021-01-01 RX ADMIN — ARFORMOTEROL TARTRATE 15 MCG: 15 SOLUTION RESPIRATORY (INHALATION) at 20:00

## 2021-01-01 RX ADMIN — DILTIAZEM HYDROCHLORIDE: 5 INJECTION, SOLUTION INTRAVENOUS at 08:00

## 2021-01-01 RX ADMIN — PANTOPRAZOLE SODIUM 40 MG: 40 TABLET, DELAYED RELEASE ORAL at 05:49

## 2021-01-01 RX ADMIN — POTASSIUM CHLORIDE 20 MEQ: 750 TABLET, FILM COATED, EXTENDED RELEASE ORAL at 17:04

## 2021-01-01 RX ADMIN — METOPROLOL TARTRATE 12.5 MG: 25 TABLET, FILM COATED ORAL at 19:46

## 2021-01-01 RX ADMIN — ONDANSETRON 4 MG: 2 INJECTION INTRAMUSCULAR; INTRAVENOUS at 21:57

## 2021-01-01 RX ADMIN — DULOXETINE HYDROCHLORIDE 60 MG: 30 CAPSULE, DELAYED RELEASE ORAL at 08:21

## 2021-01-01 RX ADMIN — IPRATROPIUM BROMIDE AND ALBUTEROL SULFATE 3 ML: .5; 2.5 SOLUTION RESPIRATORY (INHALATION) at 20:12

## 2021-01-01 RX ADMIN — FERROUS SULFATE TAB 325 MG (65 MG ELEMENTAL FE) 325 MG: 325 (65 FE) TAB at 08:47

## 2021-01-01 RX ADMIN — AZITHROMYCIN MONOHYDRATE 500 MG: 250 TABLET ORAL at 17:14

## 2021-01-01 RX ADMIN — IPRATROPIUM BROMIDE 0.5 MG: 0.5 SOLUTION RESPIRATORY (INHALATION) at 07:22

## 2021-01-01 RX ADMIN — IRON SUCROSE 100 MG: 20 INJECTION, SOLUTION INTRAVENOUS at 16:48

## 2021-01-01 RX ADMIN — ACETAMINOPHEN 650 MG: 325 TABLET ORAL at 21:57

## 2021-01-01 RX ADMIN — PANCRELIPASE 1 CAPSULE: 24000; 76000; 120000 CAPSULE, DELAYED RELEASE PELLETS ORAL at 17:39

## 2021-01-01 RX ADMIN — ARFORMOTEROL TARTRATE 15 MCG: 15 SOLUTION RESPIRATORY (INHALATION) at 07:40

## 2021-01-01 RX ADMIN — IPRATROPIUM BROMIDE AND ALBUTEROL SULFATE 3 ML: .5; 2.5 SOLUTION RESPIRATORY (INHALATION) at 18:19

## 2021-01-01 RX ADMIN — IPRATROPIUM BROMIDE AND ALBUTEROL SULFATE 3 ML: .5; 2.5 SOLUTION RESPIRATORY (INHALATION) at 11:13

## 2021-01-01 RX ADMIN — ARFORMOTEROL TARTRATE 15 MCG: 15 SOLUTION RESPIRATORY (INHALATION) at 20:33

## 2021-01-01 RX ADMIN — BUDESONIDE 500 MCG: 0.5 INHALANT RESPIRATORY (INHALATION) at 07:23

## 2021-01-01 RX ADMIN — BUDESONIDE 500 MCG: 0.5 INHALANT RESPIRATORY (INHALATION) at 20:05

## 2021-01-01 RX ADMIN — IPRATROPIUM BROMIDE AND ALBUTEROL 1 PUFF: 20; 100 SPRAY, METERED RESPIRATORY (INHALATION) at 12:01

## 2021-01-01 RX ADMIN — APIXABAN 5 MG: 5 TABLET, FILM COATED ORAL at 16:23

## 2021-01-01 RX ADMIN — ASPIRIN 81 MG: 81 TABLET, CHEWABLE ORAL at 09:04

## 2021-01-01 RX ADMIN — PANTOPRAZOLE SODIUM 40 MG: 40 TABLET, DELAYED RELEASE ORAL at 09:05

## 2021-01-01 RX ADMIN — POTASSIUM CHLORIDE 20 MEQ: 750 TABLET, FILM COATED, EXTENDED RELEASE ORAL at 08:35

## 2021-01-01 RX ADMIN — Medication 10 ML: at 12:14

## 2021-01-01 RX ADMIN — IPRATROPIUM BROMIDE AND ALBUTEROL SULFATE 3 ML: .5; 2.5 SOLUTION RESPIRATORY (INHALATION) at 09:10

## 2021-01-01 RX ADMIN — DIGOXIN 0.12 MG: 125 TABLET ORAL at 22:41

## 2021-01-01 RX ADMIN — ARFORMOTEROL TARTRATE 15 MCG: 15 SOLUTION RESPIRATORY (INHALATION) at 20:02

## 2021-01-01 RX ADMIN — PANTOPRAZOLE SODIUM 40 MG: 40 TABLET, DELAYED RELEASE ORAL at 06:13

## 2021-01-01 RX ADMIN — APIXABAN 5 MG: 5 TABLET, FILM COATED ORAL at 09:29

## 2021-01-01 RX ADMIN — DILTIAZEM HYDROCHLORIDE 360 MG: 120 CAPSULE, COATED, EXTENDED RELEASE ORAL at 09:04

## 2021-01-01 RX ADMIN — INSULIN LISPRO 2 UNITS: 100 INJECTION, SOLUTION INTRAVENOUS; SUBCUTANEOUS at 23:41

## 2021-01-01 RX ADMIN — CYANOCOBALAMIN TAB 500 MCG 1000 MCG: 500 TAB at 08:14

## 2021-01-01 RX ADMIN — APIXABAN 5 MG: 5 TABLET, FILM COATED ORAL at 08:11

## 2021-01-01 RX ADMIN — LOPERAMIDE HYDROCHLORIDE 2 MG: 2 CAPSULE ORAL at 09:12

## 2021-01-01 RX ADMIN — DIGOXIN 250 MCG: 0.25 INJECTION INTRAMUSCULAR; INTRAVENOUS at 01:37

## 2021-01-01 RX ADMIN — MEROPENEM 500 MG: 500 INJECTION, POWDER, FOR SOLUTION INTRAVENOUS at 21:29

## 2021-01-01 RX ADMIN — MIRTAZAPINE 15 MG: 15 TABLET, FILM COATED ORAL at 22:44

## 2021-01-01 RX ADMIN — Medication 10 ML: at 22:04

## 2021-01-01 RX ADMIN — MIRTAZAPINE 15 MG: 15 TABLET, FILM COATED ORAL at 22:19

## 2021-01-01 RX ADMIN — AZITHROMYCIN MONOHYDRATE 500 MG: 250 TABLET ORAL at 18:25

## 2021-01-01 RX ADMIN — PANTOPRAZOLE SODIUM 40 MG: 40 TABLET, DELAYED RELEASE ORAL at 05:58

## 2021-01-01 RX ADMIN — SODIUM CHLORIDE 75 ML/HR: 9 INJECTION, SOLUTION INTRAVENOUS at 22:46

## 2021-01-01 RX ADMIN — Medication 10 ML: at 05:42

## 2021-01-01 RX ADMIN — DIBASIC SODIUM PHOSPHATE, MONOBASIC POTASSIUM PHOSPHATE AND MONOBASIC SODIUM PHOSPHATE 1 TABLET: 852; 155; 130 TABLET ORAL at 09:05

## 2021-01-01 RX ADMIN — NYSTATIN 500000 UNITS: 100000 SUSPENSION ORAL at 12:59

## 2021-01-01 RX ADMIN — APIXABAN 5 MG: 5 TABLET, FILM COATED ORAL at 21:16

## 2021-01-01 RX ADMIN — ARFORMOTEROL TARTRATE 15 MCG: 15 SOLUTION RESPIRATORY (INHALATION) at 20:13

## 2021-01-01 RX ADMIN — DILTIAZEM HYDROCHLORIDE 360 MG: 180 CAPSULE, COATED, EXTENDED RELEASE ORAL at 09:12

## 2021-01-01 RX ADMIN — APIXABAN 5 MG: 5 TABLET, FILM COATED ORAL at 08:44

## 2021-01-01 RX ADMIN — BUDESONIDE 500 MCG: 0.5 INHALANT RESPIRATORY (INHALATION) at 20:33

## 2021-01-01 RX ADMIN — Medication 10 ML: at 14:00

## 2021-01-01 RX ADMIN — ARFORMOTEROL TARTRATE 15 MCG: 15 SOLUTION RESPIRATORY (INHALATION) at 20:04

## 2021-01-01 RX ADMIN — NYSTATIN 500000 UNITS: 100000 SUSPENSION ORAL at 12:24

## 2021-01-01 RX ADMIN — FERROUS SULFATE TAB 325 MG (65 MG ELEMENTAL FE) 325 MG: 325 (65 FE) TAB at 09:12

## 2021-01-01 RX ADMIN — FERROUS SULFATE TAB 325 MG (65 MG ELEMENTAL FE) 325 MG: 325 (65 FE) TAB at 08:21

## 2021-01-01 RX ADMIN — ARFORMOTEROL TARTRATE 15 MCG: 15 SOLUTION RESPIRATORY (INHALATION) at 20:47

## 2021-01-01 RX ADMIN — AMIODARONE HYDROCHLORIDE 200 MG: 200 TABLET ORAL at 08:34

## 2021-01-01 RX ADMIN — MONTELUKAST 10 MG: 10 TABLET, FILM COATED ORAL at 21:18

## 2021-01-01 RX ADMIN — NYSTATIN 500000 UNITS: 100000 SUSPENSION ORAL at 12:00

## 2021-01-01 RX ADMIN — DULOXETINE HYDROCHLORIDE 60 MG: 30 CAPSULE, DELAYED RELEASE ORAL at 09:22

## 2021-01-01 RX ADMIN — FERROUS SULFATE TAB 325 MG (65 MG ELEMENTAL FE) 325 MG: 325 (65 FE) TAB at 08:07

## 2021-01-01 RX ADMIN — NYSTATIN 500000 UNITS: 100000 SUSPENSION ORAL at 22:03

## 2021-01-01 RX ADMIN — CEFTRIAXONE 1 G: 1 INJECTION, POWDER, FOR SOLUTION INTRAMUSCULAR; INTRAVENOUS at 16:08

## 2021-01-01 RX ADMIN — ACETAMINOPHEN 650 MG: 325 TABLET ORAL at 22:19

## 2021-01-01 RX ADMIN — PANCRELIPASE 2 CAPSULE: 24000; 76000; 120000 CAPSULE, DELAYED RELEASE PELLETS ORAL at 08:42

## 2021-01-01 RX ADMIN — ARFORMOTEROL TARTRATE 15 MCG: 15 SOLUTION RESPIRATORY (INHALATION) at 08:18

## 2021-01-01 RX ADMIN — MONTELUKAST 10 MG: 10 TABLET, FILM COATED ORAL at 17:46

## 2021-01-01 RX ADMIN — FERROUS SULFATE TAB 325 MG (65 MG ELEMENTAL FE) 325 MG: 325 (65 FE) TAB at 08:34

## 2021-01-01 RX ADMIN — DEXTROSE 150 MG: 50 INJECTION, SOLUTION INTRAVENOUS at 19:06

## 2021-01-01 RX ADMIN — PANCRELIPASE 1 CAPSULE: 24000; 76000; 120000 CAPSULE, DELAYED RELEASE PELLETS ORAL at 10:21

## 2021-01-01 RX ADMIN — Medication 10 ML: at 13:16

## 2021-01-01 RX ADMIN — FERROUS SULFATE TAB 325 MG (65 MG ELEMENTAL FE) 325 MG: 325 (65 FE) TAB at 08:24

## 2021-01-01 RX ADMIN — NYSTATIN 500000 UNITS: 100000 SUSPENSION ORAL at 12:25

## 2021-01-01 RX ADMIN — PREDNISONE 10 MG: 5 TABLET ORAL at 10:32

## 2021-01-01 RX ADMIN — CYANOCOBALAMIN TAB 500 MCG 1000 MCG: 500 TAB at 08:13

## 2021-01-01 RX ADMIN — INSULIN LISPRO 1 UNITS: 100 INJECTION, SOLUTION INTRAVENOUS; SUBCUTANEOUS at 21:23

## 2021-01-01 RX ADMIN — IPRATROPIUM BROMIDE 0.5 MG: 0.5 SOLUTION RESPIRATORY (INHALATION) at 11:07

## 2021-01-01 RX ADMIN — NYSTATIN 500000 UNITS: 100000 SUSPENSION ORAL at 08:33

## 2021-01-01 RX ADMIN — DIGOXIN 0.12 MG: 250 TABLET ORAL at 12:02

## 2021-01-01 RX ADMIN — NYSTATIN 500000 UNITS: 100000 SUSPENSION ORAL at 08:56

## 2021-01-01 RX ADMIN — FUROSEMIDE 40 MG: 10 INJECTION, SOLUTION INTRAVENOUS at 17:54

## 2021-01-01 RX ADMIN — PANCRELIPASE 2 CAPSULE: 24000; 76000; 120000 CAPSULE, DELAYED RELEASE PELLETS ORAL at 12:53

## 2021-01-01 RX ADMIN — MIRTAZAPINE 15 MG: 15 TABLET, FILM COATED ORAL at 21:28

## 2021-01-01 RX ADMIN — DILTIAZEM HYDROCHLORIDE 30 MG: 30 TABLET, FILM COATED ORAL at 18:24

## 2021-01-01 RX ADMIN — AZITHROMYCIN MONOHYDRATE 500 MG: 500 INJECTION, POWDER, LYOPHILIZED, FOR SOLUTION INTRAVENOUS at 16:07

## 2021-01-01 RX ADMIN — PANCRELIPASE 2 CAPSULE: 24000; 76000; 120000 CAPSULE, DELAYED RELEASE PELLETS ORAL at 12:02

## 2021-01-01 RX ADMIN — IPRATROPIUM BROMIDE AND ALBUTEROL 1 PUFF: 20; 100 SPRAY, METERED RESPIRATORY (INHALATION) at 08:57

## 2021-01-01 RX ADMIN — ARFORMOTEROL TARTRATE 15 MCG: 15 SOLUTION RESPIRATORY (INHALATION) at 19:31

## 2021-01-01 RX ADMIN — PANCRELIPASE 1 CAPSULE: 24000; 76000; 120000 CAPSULE, DELAYED RELEASE PELLETS ORAL at 08:23

## 2021-01-01 RX ADMIN — FUROSEMIDE 20 MG: 10 INJECTION, SOLUTION INTRAMUSCULAR; INTRAVENOUS at 18:57

## 2021-01-01 RX ADMIN — DILTIAZEM HYDROCHLORIDE 240 MG: 240 CAPSULE, COATED, EXTENDED RELEASE ORAL at 10:21

## 2021-01-01 RX ADMIN — AMIODARONE HYDROCHLORIDE 200 MG: 200 TABLET ORAL at 08:14

## 2021-01-01 RX ADMIN — MONTELUKAST 10 MG: 10 TABLET, FILM COATED ORAL at 21:28

## 2021-01-01 RX ADMIN — APIXABAN 5 MG: 5 TABLET, FILM COATED ORAL at 22:12

## 2021-01-01 RX ADMIN — PREDNISONE 20 MG: 20 TABLET ORAL at 08:42

## 2021-01-01 RX ADMIN — APIXABAN 5 MG: 5 TABLET, FILM COATED ORAL at 08:34

## 2021-01-01 RX ADMIN — DIBASIC SODIUM PHOSPHATE, MONOBASIC POTASSIUM PHOSPHATE AND MONOBASIC SODIUM PHOSPHATE 1 TABLET: 852; 155; 130 TABLET ORAL at 17:00

## 2021-01-01 RX ADMIN — IPRATROPIUM BROMIDE AND ALBUTEROL SULFATE 3 ML: .5; 2.5 SOLUTION RESPIRATORY (INHALATION) at 07:52

## 2021-01-01 RX ADMIN — PANCRELIPASE 2 CAPSULE: 24000; 76000; 120000 CAPSULE, DELAYED RELEASE PELLETS ORAL at 16:09

## 2021-01-01 RX ADMIN — NYSTATIN 500000 UNITS: 100000 SUSPENSION ORAL at 08:39

## 2021-01-01 RX ADMIN — APIXABAN 5 MG: 5 TABLET, FILM COATED ORAL at 17:58

## 2021-01-01 RX ADMIN — FOLIC ACID 1 MG: 1 TABLET ORAL at 08:44

## 2021-01-01 RX ADMIN — POTASSIUM CHLORIDE 40 MEQ: 750 TABLET, FILM COATED, EXTENDED RELEASE ORAL at 13:48

## 2021-01-01 RX ADMIN — MONTELUKAST 10 MG: 10 TABLET, FILM COATED ORAL at 17:54

## 2021-01-01 RX ADMIN — APIXABAN 5 MG: 5 TABLET, FILM COATED ORAL at 21:58

## 2021-01-01 RX ADMIN — PANCRELIPASE 1 CAPSULE: 24000; 76000; 120000 CAPSULE, DELAYED RELEASE PELLETS ORAL at 09:12

## 2021-01-01 RX ADMIN — IPRATROPIUM BROMIDE AND ALBUTEROL 1 PUFF: 20; 100 SPRAY, METERED RESPIRATORY (INHALATION) at 20:23

## 2021-01-01 RX ADMIN — Medication 10 ML: at 13:42

## 2021-01-01 RX ADMIN — ARFORMOTEROL TARTRATE 15 MCG: 15 SOLUTION RESPIRATORY (INHALATION) at 20:50

## 2021-01-01 RX ADMIN — NYSTATIN 500000 UNITS: 100000 SUSPENSION ORAL at 17:38

## 2021-01-01 RX ADMIN — DIBASIC SODIUM PHOSPHATE, MONOBASIC POTASSIUM PHOSPHATE AND MONOBASIC SODIUM PHOSPHATE 1 TABLET: 852; 155; 130 TABLET ORAL at 17:46

## 2021-01-01 RX ADMIN — DIGOXIN 250 MCG: 0.25 INJECTION INTRAMUSCULAR; INTRAVENOUS at 03:01

## 2021-01-01 RX ADMIN — POTASSIUM CHLORIDE 20 MEQ: 750 TABLET, FILM COATED, EXTENDED RELEASE ORAL at 17:33

## 2021-01-01 RX ADMIN — INSULIN GLARGINE 18 UNITS: 100 INJECTION, SOLUTION SUBCUTANEOUS at 22:46

## 2021-01-01 RX ADMIN — Medication 10 ML: at 21:28

## 2021-01-01 RX ADMIN — ONDANSETRON 4 MG: 2 INJECTION INTRAMUSCULAR; INTRAVENOUS at 10:59

## 2021-01-01 RX ADMIN — APIXABAN 5 MG: 5 TABLET, FILM COATED ORAL at 22:03

## 2021-01-01 RX ADMIN — DIBASIC SODIUM PHOSPHATE, MONOBASIC POTASSIUM PHOSPHATE AND MONOBASIC SODIUM PHOSPHATE 1 TABLET: 852; 155; 130 TABLET ORAL at 17:35

## 2021-01-01 RX ADMIN — IPRATROPIUM BROMIDE AND ALBUTEROL SULFATE 3 ML: .5; 2.5 SOLUTION RESPIRATORY (INHALATION) at 21:15

## 2021-01-01 RX ADMIN — DULOXETINE HYDROCHLORIDE 60 MG: 30 CAPSULE, DELAYED RELEASE ORAL at 08:07

## 2021-01-01 RX ADMIN — FERROUS SULFATE TAB 325 MG (65 MG ELEMENTAL FE) 325 MG: 325 (65 FE) TAB at 09:04

## 2021-01-01 RX ADMIN — AMIODARONE HYDROCHLORIDE 200 MG: 200 TABLET ORAL at 10:36

## 2021-01-01 RX ADMIN — IRON SUCROSE 100 MG: 20 INJECTION, SOLUTION INTRAVENOUS at 17:39

## 2021-01-01 RX ADMIN — DILTIAZEM HYDROCHLORIDE 240 MG: 240 CAPSULE, COATED, EXTENDED RELEASE ORAL at 10:05

## 2021-01-01 RX ADMIN — APIXABAN 5 MG: 5 TABLET, FILM COATED ORAL at 16:40

## 2021-01-01 RX ADMIN — SODIUM CHLORIDE 250 ML: 9 INJECTION, SOLUTION INTRAVENOUS at 16:46

## 2021-01-01 RX ADMIN — NYSTATIN 500000 UNITS: 100000 SUSPENSION ORAL at 22:13

## 2021-01-01 RX ADMIN — LOPERAMIDE HYDROCHLORIDE 2 MG: 2 CAPSULE ORAL at 08:07

## 2021-01-01 RX ADMIN — PANTOPRAZOLE SODIUM 40 MG: 40 TABLET, DELAYED RELEASE ORAL at 14:04

## 2021-01-01 RX ADMIN — PANCRELIPASE 1 CAPSULE: 24000; 76000; 120000 CAPSULE, DELAYED RELEASE PELLETS ORAL at 08:12

## 2021-01-01 RX ADMIN — IPRATROPIUM BROMIDE AND ALBUTEROL 1 PUFF: 20; 100 SPRAY, METERED RESPIRATORY (INHALATION) at 09:11

## 2021-01-01 RX ADMIN — Medication 10 ML: at 22:02

## 2021-01-01 RX ADMIN — INSULIN LISPRO 2 UNITS: 100 INJECTION, SOLUTION INTRAVENOUS; SUBCUTANEOUS at 17:14

## 2021-01-01 RX ADMIN — IPRATROPIUM BROMIDE AND ALBUTEROL SULFATE 3 ML: .5; 2.5 SOLUTION RESPIRATORY (INHALATION) at 14:43

## 2021-01-01 RX ADMIN — PANTOPRAZOLE SODIUM 40 MG: 40 TABLET, DELAYED RELEASE ORAL at 05:24

## 2021-01-01 RX ADMIN — INSULIN LISPRO 2 UNITS: 100 INJECTION, SOLUTION INTRAVENOUS; SUBCUTANEOUS at 17:45

## 2021-01-01 RX ADMIN — DILTIAZEM HYDROCHLORIDE 120 MG: 120 CAPSULE, COATED, EXTENDED RELEASE ORAL at 12:02

## 2021-01-01 RX ADMIN — AMIODARONE HYDROCHLORIDE 0.5 MG/MIN: 50 INJECTION, SOLUTION INTRAVENOUS at 16:00

## 2021-01-01 RX ADMIN — APIXABAN 5 MG: 5 TABLET, FILM COATED ORAL at 22:41

## 2021-01-01 RX ADMIN — POTASSIUM CHLORIDE 20 MEQ: 750 TABLET, FILM COATED, EXTENDED RELEASE ORAL at 01:37

## 2021-01-01 RX ADMIN — INSULIN LISPRO 4 UNITS: 100 INJECTION, SOLUTION INTRAVENOUS; SUBCUTANEOUS at 16:36

## 2021-01-01 RX ADMIN — ONDANSETRON 4 MG: 2 INJECTION INTRAMUSCULAR; INTRAVENOUS at 16:46

## 2021-01-01 RX ADMIN — CYANOCOBALAMIN TAB 500 MCG 1000 MCG: 500 TAB at 10:20

## 2021-01-01 RX ADMIN — DULOXETINE HYDROCHLORIDE 60 MG: 30 CAPSULE, DELAYED RELEASE ORAL at 09:12

## 2021-01-01 RX ADMIN — Medication 10 ML: at 12:24

## 2021-01-01 RX ADMIN — APIXABAN 5 MG: 5 TABLET, FILM COATED ORAL at 08:41

## 2021-01-01 RX ADMIN — Medication 2000 UNITS: at 08:35

## 2021-01-01 RX ADMIN — Medication 10 ML: at 21:44

## 2021-01-01 RX ADMIN — DULOXETINE HYDROCHLORIDE 60 MG: 30 CAPSULE, DELAYED RELEASE ORAL at 09:10

## 2021-01-01 RX ADMIN — DIBASIC SODIUM PHOSPHATE, MONOBASIC POTASSIUM PHOSPHATE AND MONOBASIC SODIUM PHOSPHATE 1 TABLET: 852; 155; 130 TABLET ORAL at 09:12

## 2021-01-01 RX ADMIN — Medication 10 ML: at 03:38

## 2021-01-01 RX ADMIN — NYSTATIN 500000 UNITS: 100000 SUSPENSION ORAL at 22:11

## 2021-01-01 RX ADMIN — Medication 10 ML: at 20:56

## 2021-01-01 RX ADMIN — AMIODARONE HYDROCHLORIDE 400 MG: 200 TABLET ORAL at 17:38

## 2021-01-01 RX ADMIN — MONTELUKAST 10 MG: 10 TABLET, FILM COATED ORAL at 22:13

## 2021-01-01 RX ADMIN — METOPROLOL TARTRATE 25 MG: 25 TABLET, FILM COATED ORAL at 11:52

## 2021-01-01 RX ADMIN — APIXABAN 5 MG: 5 TABLET, FILM COATED ORAL at 17:46

## 2021-01-01 RX ADMIN — LOPERAMIDE HYDROCHLORIDE 2 MG: 2 CAPSULE ORAL at 08:12

## 2021-01-01 RX ADMIN — METOPROLOL TARTRATE 25 MG: 25 TABLET, FILM COATED ORAL at 04:37

## 2021-01-01 RX ADMIN — NYSTATIN 500000 UNITS: 100000 SUSPENSION ORAL at 22:12

## 2021-01-01 RX ADMIN — MONTELUKAST 10 MG: 10 TABLET, FILM COATED ORAL at 21:46

## 2021-01-01 RX ADMIN — MEROPENEM 500 MG: 500 INJECTION, POWDER, FOR SOLUTION INTRAVENOUS at 12:26

## 2021-01-01 RX ADMIN — AMIODARONE HYDROCHLORIDE 200 MG: 200 TABLET ORAL at 09:09

## 2021-01-01 RX ADMIN — NYSTATIN 500000 UNITS: 100000 SUSPENSION ORAL at 13:10

## 2021-01-01 RX ADMIN — BISACODYL 10 MG: 5 TABLET, COATED ORAL at 16:46

## 2021-01-01 RX ADMIN — DULOXETINE HYDROCHLORIDE 60 MG: 30 CAPSULE, DELAYED RELEASE ORAL at 08:56

## 2021-01-01 RX ADMIN — PHENYLEPHRINE HYDROCHLORIDE 85 MCG/MIN: 10 INJECTION INTRAVENOUS at 15:37

## 2021-01-01 RX ADMIN — METOPROLOL TARTRATE 25 MG: 25 TABLET, FILM COATED ORAL at 12:00

## 2021-01-01 RX ADMIN — POTASSIUM CHLORIDE 20 MEQ: 750 TABLET, FILM COATED, EXTENDED RELEASE ORAL at 17:22

## 2021-01-01 RX ADMIN — AMIODARONE HYDROCHLORIDE 200 MG: 200 TABLET ORAL at 08:11

## 2021-01-01 RX ADMIN — POTASSIUM CHLORIDE 20 MEQ: 750 TABLET, FILM COATED, EXTENDED RELEASE ORAL at 17:51

## 2021-01-01 RX ADMIN — NYSTATIN 500000 UNITS: 100000 SUSPENSION ORAL at 08:57

## 2021-01-01 RX ADMIN — Medication 10 ML: at 13:23

## 2021-01-01 RX ADMIN — Medication 10 ML: at 22:13

## 2021-01-01 RX ADMIN — NYSTATIN 500000 UNITS: 100000 SUSPENSION ORAL at 22:40

## 2021-01-01 RX ADMIN — PREDNISONE 20 MG: 20 TABLET ORAL at 09:23

## 2021-01-01 RX ADMIN — FERROUS SULFATE TAB 325 MG (65 MG ELEMENTAL FE) 325 MG: 325 (65 FE) TAB at 09:22

## 2021-01-01 RX ADMIN — Medication 2 TABLET: at 08:21

## 2021-01-01 RX ADMIN — NYSTATIN 500000 UNITS: 100000 SUSPENSION ORAL at 09:28

## 2021-01-01 RX ADMIN — PREDNISONE 20 MG: 20 TABLET ORAL at 10:20

## 2021-01-01 RX ADMIN — NYSTATIN 500000 UNITS: 100000 SUSPENSION ORAL at 17:25

## 2021-01-01 RX ADMIN — Medication 10 ML: at 16:47

## 2021-01-01 RX ADMIN — NYSTATIN 500000 UNITS: 100000 SUSPENSION ORAL at 14:37

## 2021-01-01 RX ADMIN — APIXABAN 5 MG: 5 TABLET, FILM COATED ORAL at 09:05

## 2021-01-01 RX ADMIN — NYSTATIN 500000 UNITS: 100000 SUSPENSION ORAL at 09:08

## 2021-01-01 RX ADMIN — AMIODARONE HYDROCHLORIDE 150 MG: 50 INJECTION, SOLUTION INTRAVENOUS at 02:22

## 2021-01-01 RX ADMIN — IPRATROPIUM BROMIDE AND ALBUTEROL SULFATE 3 ML: .5; 2.5 SOLUTION RESPIRATORY (INHALATION) at 07:55

## 2021-01-01 RX ADMIN — DIGOXIN 0.12 MG: 125 TABLET ORAL at 22:44

## 2021-01-01 RX ADMIN — INSULIN LISPRO 1 UNITS: 100 INJECTION, SOLUTION INTRAVENOUS; SUBCUTANEOUS at 22:03

## 2021-01-01 RX ADMIN — ARFORMOTEROL TARTRATE 15 MCG: 15 SOLUTION RESPIRATORY (INHALATION) at 20:46

## 2021-01-01 RX ADMIN — ARFORMOTEROL TARTRATE 15 MCG: 15 SOLUTION RESPIRATORY (INHALATION) at 07:12

## 2021-01-01 RX ADMIN — NYSTATIN 500000 UNITS: 100000 SUSPENSION ORAL at 12:48

## 2021-01-01 RX ADMIN — NYSTATIN 500000 UNITS: 100000 SUSPENSION ORAL at 12:58

## 2021-01-01 RX ADMIN — DULOXETINE HYDROCHLORIDE 60 MG: 30 CAPSULE, DELAYED RELEASE ORAL at 09:29

## 2021-01-01 RX ADMIN — METOPROLOL TARTRATE 25 MG: 25 TABLET, FILM COATED ORAL at 17:41

## 2021-01-01 RX ADMIN — PHENYLEPHRINE HYDROCHLORIDE 110 MCG/MIN: 10 INJECTION INTRAVENOUS at 18:06

## 2021-01-01 RX ADMIN — Medication 2000 UNITS: at 09:10

## 2021-01-01 RX ADMIN — NYSTATIN 500000 UNITS: 100000 SUSPENSION ORAL at 22:46

## 2021-01-01 RX ADMIN — IPRATROPIUM BROMIDE AND ALBUTEROL SULFATE 3 ML: .5; 2.5 SOLUTION RESPIRATORY (INHALATION) at 20:16

## 2021-01-01 RX ADMIN — APIXABAN 5 MG: 5 TABLET, FILM COATED ORAL at 09:04

## 2021-01-01 RX ADMIN — Medication 10 ML: at 06:27

## 2021-01-01 RX ADMIN — DILTIAZEM HYDROCHLORIDE 30 MG: 30 TABLET, FILM COATED ORAL at 06:23

## 2021-01-01 RX ADMIN — LOPERAMIDE HYDROCHLORIDE 2 MG: 2 CAPSULE ORAL at 17:14

## 2021-01-01 RX ADMIN — ONDANSETRON 4 MG: 2 INJECTION INTRAMUSCULAR; INTRAVENOUS at 04:35

## 2021-01-01 RX ADMIN — BUDESONIDE 500 MCG: 0.5 INHALANT RESPIRATORY (INHALATION) at 20:00

## 2021-01-01 RX ADMIN — Medication 10 ML: at 22:21

## 2021-01-01 RX ADMIN — PREDNISONE 20 MG: 20 TABLET ORAL at 08:07

## 2021-01-01 RX ADMIN — IPRATROPIUM BROMIDE AND ALBUTEROL SULFATE 3 ML: .5; 3 SOLUTION RESPIRATORY (INHALATION) at 01:01

## 2021-01-01 RX ADMIN — MIRTAZAPINE 15 MG: 15 TABLET, FILM COATED ORAL at 22:04

## 2021-01-01 RX ADMIN — IPRATROPIUM BROMIDE AND ALBUTEROL SULFATE 3 ML: .5; 2.5 SOLUTION RESPIRATORY (INHALATION) at 15:10

## 2021-01-01 RX ADMIN — SODIUM CHLORIDE 100 ML: 9 INJECTION, SOLUTION INTRAVENOUS at 17:06

## 2021-01-01 RX ADMIN — NYSTATIN 500000 UNITS: 100000 SUSPENSION ORAL at 17:06

## 2021-01-01 RX ADMIN — PANCRELIPASE 2 CAPSULE: 24000; 76000; 120000 CAPSULE, DELAYED RELEASE PELLETS ORAL at 16:54

## 2021-01-01 RX ADMIN — CYANOCOBALAMIN TAB 500 MCG 1000 MCG: 500 TAB at 09:05

## 2021-01-01 RX ADMIN — DILTIAZEM HYDROCHLORIDE 60 MG: 30 TABLET, FILM COATED ORAL at 21:15

## 2021-01-01 RX ADMIN — INSULIN LISPRO 2 UNITS: 100 INJECTION, SOLUTION INTRAVENOUS; SUBCUTANEOUS at 21:06

## 2021-01-01 RX ADMIN — APIXABAN 5 MG: 5 TABLET, FILM COATED ORAL at 12:00

## 2021-01-01 RX ADMIN — ONDANSETRON 4 MG: 2 INJECTION INTRAMUSCULAR; INTRAVENOUS at 03:36

## 2021-01-01 RX ADMIN — IPRATROPIUM BROMIDE AND ALBUTEROL 1 PUFF: 20; 100 SPRAY, METERED RESPIRATORY (INHALATION) at 21:01

## 2021-01-01 RX ADMIN — IPRATROPIUM BROMIDE AND ALBUTEROL SULFATE 3 ML: .5; 2.5 SOLUTION RESPIRATORY (INHALATION) at 20:33

## 2021-01-01 RX ADMIN — IPRATROPIUM BROMIDE AND ALBUTEROL 1 PUFF: 20; 100 SPRAY, METERED RESPIRATORY (INHALATION) at 12:11

## 2021-01-01 RX ADMIN — PHENYLEPHRINE HYDROCHLORIDE 50 MCG/MIN: 10 INJECTION INTRAVENOUS at 08:17

## 2021-01-01 RX ADMIN — PREDNISONE 20 MG: 20 TABLET ORAL at 08:47

## 2021-01-01 RX ADMIN — IPRATROPIUM BROMIDE AND ALBUTEROL 1 PUFF: 20; 100 SPRAY, METERED RESPIRATORY (INHALATION) at 17:20

## 2021-01-01 RX ADMIN — Medication 2000 UNITS: at 08:40

## 2021-01-01 RX ADMIN — Medication 10 ML: at 17:22

## 2021-01-01 RX ADMIN — AMIODARONE HYDROCHLORIDE 400 MG: 200 TABLET ORAL at 09:26

## 2021-01-01 RX ADMIN — FUROSEMIDE 40 MG: 10 INJECTION, SOLUTION INTRAMUSCULAR; INTRAVENOUS at 09:07

## 2021-01-01 RX ADMIN — IPRATROPIUM BROMIDE AND ALBUTEROL SULFATE 3 ML: .5; 2.5 SOLUTION RESPIRATORY (INHALATION) at 01:13

## 2021-01-01 RX ADMIN — IPRATROPIUM BROMIDE 0.5 MG: 0.5 SOLUTION RESPIRATORY (INHALATION) at 07:05

## 2021-01-01 RX ADMIN — NYSTATIN 500000 UNITS: 100000 SUSPENSION ORAL at 17:29

## 2021-01-01 RX ADMIN — CYANOCOBALAMIN TAB 500 MCG 1000 MCG: 500 TAB at 09:22

## 2021-01-01 RX ADMIN — PANCRELIPASE 2 CAPSULE: 24000; 76000; 120000 CAPSULE, DELAYED RELEASE PELLETS ORAL at 09:08

## 2021-01-01 RX ADMIN — Medication 10 ML: at 20:13

## 2021-01-01 RX ADMIN — DILTIAZEM HYDROCHLORIDE 60 MG: 30 TABLET, FILM COATED ORAL at 21:28

## 2021-01-01 RX ADMIN — CYANOCOBALAMIN TAB 500 MCG 1000 MCG: 500 TAB at 08:56

## 2021-01-01 RX ADMIN — PHENYLEPHRINE HYDROCHLORIDE 85 MCG/MIN: 10 INJECTION INTRAVENOUS at 06:06

## 2021-01-01 RX ADMIN — APIXABAN 5 MG: 5 TABLET, FILM COATED ORAL at 08:57

## 2021-01-01 RX ADMIN — FUROSEMIDE 40 MG: 10 INJECTION, SOLUTION INTRAVENOUS at 17:15

## 2021-01-01 RX ADMIN — APIXABAN 5 MG: 5 TABLET, FILM COATED ORAL at 09:07

## 2021-01-01 RX ADMIN — MIRTAZAPINE 15 MG: 15 TABLET, FILM COATED ORAL at 21:59

## 2021-01-01 RX ADMIN — APIXABAN 5 MG: 5 TABLET, FILM COATED ORAL at 10:05

## 2021-01-01 RX ADMIN — Medication 10 ML: at 13:49

## 2021-01-01 RX ADMIN — NYSTATIN 500000 UNITS: 100000 SUSPENSION ORAL at 13:03

## 2021-01-01 RX ADMIN — Medication 10 ML: at 06:06

## 2021-01-01 RX ADMIN — DULOXETINE HYDROCHLORIDE 60 MG: 30 CAPSULE, DELAYED RELEASE ORAL at 08:14

## 2021-01-01 RX ADMIN — MONTELUKAST 10 MG: 10 TABLET, FILM COATED ORAL at 17:39

## 2021-01-01 RX ADMIN — APIXABAN 5 MG: 5 TABLET, FILM COATED ORAL at 17:02

## 2021-01-01 RX ADMIN — NYSTATIN 500000 UNITS: 100000 SUSPENSION ORAL at 08:42

## 2021-01-01 RX ADMIN — DIGOXIN 0.12 MG: 125 TABLET ORAL at 22:04

## 2021-01-01 RX ADMIN — INSULIN LISPRO 4 UNITS: 100 INJECTION, SOLUTION INTRAVENOUS; SUBCUTANEOUS at 17:18

## 2021-01-01 RX ADMIN — APIXABAN 5 MG: 5 TABLET, FILM COATED ORAL at 17:41

## 2021-01-01 RX ADMIN — PANCRELIPASE 1 CAPSULE: 24000; 76000; 120000 CAPSULE, DELAYED RELEASE PELLETS ORAL at 08:46

## 2021-01-01 RX ADMIN — IPRATROPIUM BROMIDE AND ALBUTEROL SULFATE 3 ML: .5; 2.5 SOLUTION RESPIRATORY (INHALATION) at 11:04

## 2021-01-01 RX ADMIN — INSULIN LISPRO 2 UNITS: 100 INJECTION, SOLUTION INTRAVENOUS; SUBCUTANEOUS at 17:01

## 2021-01-01 RX ADMIN — FERROUS SULFATE TAB 325 MG (65 MG ELEMENTAL FE) 325 MG: 325 (65 FE) TAB at 09:08

## 2021-01-01 RX ADMIN — NYSTATIN 500000 UNITS: 100000 SUSPENSION ORAL at 12:56

## 2021-01-01 RX ADMIN — METOPROLOL TARTRATE 25 MG: 25 TABLET, FILM COATED ORAL at 16:46

## 2021-01-01 RX ADMIN — METOPROLOL TARTRATE 50 MG: 50 TABLET, FILM COATED ORAL at 17:29

## 2021-01-01 RX ADMIN — LOPERAMIDE HYDROCHLORIDE 2 MG: 2 CAPSULE ORAL at 09:22

## 2021-01-01 RX ADMIN — PANCRELIPASE 1 CAPSULE: 24000; 76000; 120000 CAPSULE, DELAYED RELEASE PELLETS ORAL at 12:45

## 2021-01-01 RX ADMIN — PANCRELIPASE 2 CAPSULE: 24000; 76000; 120000 CAPSULE, DELAYED RELEASE PELLETS ORAL at 08:35

## 2021-01-01 RX ADMIN — IPRATROPIUM BROMIDE AND ALBUTEROL 1 PUFF: 20; 100 SPRAY, METERED RESPIRATORY (INHALATION) at 05:52

## 2021-01-01 RX ADMIN — DIBASIC SODIUM PHOSPHATE, MONOBASIC POTASSIUM PHOSPHATE AND MONOBASIC SODIUM PHOSPHATE 1 TABLET: 852; 155; 130 TABLET ORAL at 08:57

## 2021-01-01 RX ADMIN — IPRATROPIUM BROMIDE AND ALBUTEROL SULFATE 3 ML: .5; 2.5 SOLUTION RESPIRATORY (INHALATION) at 14:30

## 2021-01-01 RX ADMIN — NYSTATIN 500000 UNITS: 100000 SUSPENSION ORAL at 08:15

## 2021-01-01 RX ADMIN — FERROUS SULFATE TAB 325 MG (65 MG ELEMENTAL FE) 325 MG: 325 (65 FE) TAB at 10:05

## 2021-01-01 RX ADMIN — Medication 10 ML: at 12:48

## 2021-01-01 RX ADMIN — BUDESONIDE 500 MCG: 0.5 INHALANT RESPIRATORY (INHALATION) at 07:09

## 2021-01-01 RX ADMIN — FUROSEMIDE 40 MG: 10 INJECTION, SOLUTION INTRAMUSCULAR; INTRAVENOUS at 09:29

## 2021-01-01 RX ADMIN — PANCRELIPASE 1 CAPSULE: 24000; 76000; 120000 CAPSULE, DELAYED RELEASE PELLETS ORAL at 18:31

## 2021-01-01 RX ADMIN — INSULIN LISPRO 2 UNITS: 100 INJECTION, SOLUTION INTRAVENOUS; SUBCUTANEOUS at 17:54

## 2021-01-01 RX ADMIN — ACETAMINOPHEN 650 MG: 325 TABLET ORAL at 13:53

## 2021-01-01 RX ADMIN — IPRATROPIUM BROMIDE 0.5 MG: 0.5 SOLUTION RESPIRATORY (INHALATION) at 20:45

## 2021-01-01 RX ADMIN — APIXABAN 5 MG: 5 TABLET, FILM COATED ORAL at 21:05

## 2021-01-01 RX ADMIN — METHYLPREDNISOLONE SODIUM SUCCINATE 40 MG: 40 INJECTION, POWDER, FOR SOLUTION INTRAMUSCULAR; INTRAVENOUS at 09:13

## 2021-01-01 RX ADMIN — PANTOPRAZOLE SODIUM 40 MG: 40 TABLET, DELAYED RELEASE ORAL at 05:17

## 2021-01-01 RX ADMIN — Medication 10 ML: at 21:59

## 2021-01-01 RX ADMIN — APIXABAN 5 MG: 5 TABLET, FILM COATED ORAL at 08:47

## 2021-01-01 RX ADMIN — NYSTATIN 500000 UNITS: 100000 SUSPENSION ORAL at 09:05

## 2021-01-01 RX ADMIN — MONTELUKAST SODIUM 10 MG: 10 TABLET, FILM COATED ORAL at 22:12

## 2021-01-01 RX ADMIN — AMIODARONE HYDROCHLORIDE 400 MG: 200 TABLET ORAL at 09:04

## 2021-01-01 RX ADMIN — CYANOCOBALAMIN TAB 500 MCG 1000 MCG: 500 TAB at 10:05

## 2021-01-01 RX ADMIN — SODIUM CHLORIDE 40 MG: 9 INJECTION INTRAMUSCULAR; INTRAVENOUS; SUBCUTANEOUS at 21:18

## 2021-01-01 RX ADMIN — IPRATROPIUM BROMIDE AND ALBUTEROL SULFATE 3 ML: .5; 2.5 SOLUTION RESPIRATORY (INHALATION) at 20:10

## 2021-01-01 RX ADMIN — METOPROLOL TARTRATE 25 MG: 25 TABLET, FILM COATED ORAL at 09:50

## 2021-01-01 RX ADMIN — NYSTATIN 500000 UNITS: 100000 SUSPENSION ORAL at 12:06

## 2021-01-01 RX ADMIN — SODIUM CHLORIDE 40 MG: 9 INJECTION INTRAMUSCULAR; INTRAVENOUS; SUBCUTANEOUS at 10:09

## 2021-01-01 RX ADMIN — CYANOCOBALAMIN TAB 500 MCG 1000 MCG: 500 TAB at 08:35

## 2021-01-01 RX ADMIN — DILTIAZEM HYDROCHLORIDE 30 MG: 30 TABLET, FILM COATED ORAL at 01:29

## 2021-01-01 RX ADMIN — PANCRELIPASE 2 CAPSULE: 24000; 76000; 120000 CAPSULE, DELAYED RELEASE PELLETS ORAL at 08:43

## 2021-01-01 RX ADMIN — Medication 10 ML: at 20:00

## 2021-01-01 RX ADMIN — PANCRELIPASE 1 CAPSULE: 24000; 76000; 120000 CAPSULE, DELAYED RELEASE PELLETS ORAL at 12:01

## 2021-01-01 RX ADMIN — APIXABAN 5 MG: 5 TABLET, FILM COATED ORAL at 09:26

## 2021-01-01 RX ADMIN — DILTIAZEM HYDROCHLORIDE 240 MG: 240 CAPSULE, COATED, EXTENDED RELEASE ORAL at 08:07

## 2021-01-01 RX ADMIN — POTASSIUM CHLORIDE 20 MEQ: 750 TABLET, FILM COATED, EXTENDED RELEASE ORAL at 17:34

## 2021-01-01 RX ADMIN — IPRATROPIUM BROMIDE AND ALBUTEROL 1 PUFF: 20; 100 SPRAY, METERED RESPIRATORY (INHALATION) at 15:52

## 2021-01-01 RX ADMIN — PANCRELIPASE 2 CAPSULE: 24000; 76000; 120000 CAPSULE, DELAYED RELEASE PELLETS ORAL at 16:28

## 2021-01-01 RX ADMIN — Medication 10 ML: at 05:22

## 2021-01-01 RX ADMIN — APIXABAN 5 MG: 5 TABLET, FILM COATED ORAL at 10:17

## 2021-01-01 RX ADMIN — MIRTAZAPINE 15 MG: 15 TABLET, FILM COATED ORAL at 20:56

## 2021-01-01 RX ADMIN — Medication 2 TABLET: at 12:01

## 2021-01-01 RX ADMIN — IPRATROPIUM BROMIDE AND ALBUTEROL SULFATE 3 ML: .5; 2.5 SOLUTION RESPIRATORY (INHALATION) at 02:52

## 2021-01-01 RX ADMIN — DILTIAZEM HYDROCHLORIDE 60 MG: 30 TABLET, FILM COATED ORAL at 21:00

## 2021-01-01 RX ADMIN — CYANOCOBALAMIN 1000 MCG: 1000 INJECTION, SOLUTION INTRAMUSCULAR; SUBCUTANEOUS at 17:27

## 2021-01-01 RX ADMIN — CYANOCOBALAMIN TAB 500 MCG 1000 MCG: 500 TAB at 08:43

## 2021-01-01 RX ADMIN — DILTIAZEM HYDROCHLORIDE 60 MG: 30 TABLET, FILM COATED ORAL at 05:45

## 2021-01-01 RX ADMIN — IPRATROPIUM BROMIDE AND ALBUTEROL SULFATE 3 ML: .5; 2.5 SOLUTION RESPIRATORY (INHALATION) at 18:29

## 2021-01-01 RX ADMIN — DIBASIC SODIUM PHOSPHATE, MONOBASIC POTASSIUM PHOSPHATE AND MONOBASIC SODIUM PHOSPHATE 1 TABLET: 852; 155; 130 TABLET ORAL at 17:38

## 2021-01-01 RX ADMIN — IPRATROPIUM BROMIDE AND ALBUTEROL SULFATE 3 ML: .5; 2.5 SOLUTION RESPIRATORY (INHALATION) at 21:35

## 2021-01-01 RX ADMIN — POTASSIUM CHLORIDE 40 MEQ: 750 TABLET, FILM COATED, EXTENDED RELEASE ORAL at 10:21

## 2021-01-01 RX ADMIN — ARFORMOTEROL TARTRATE 15 MCG: 15 SOLUTION RESPIRATORY (INHALATION) at 07:32

## 2021-01-01 RX ADMIN — Medication 10 ML: at 04:37

## 2021-01-01 RX ADMIN — NYSTATIN 500000 UNITS: 100000 SUSPENSION ORAL at 17:04

## 2021-01-01 RX ADMIN — DILTIAZEM HYDROCHLORIDE 240 MG: 240 CAPSULE, COATED, EXTENDED RELEASE ORAL at 09:22

## 2021-01-01 RX ADMIN — PHENYLEPHRINE HYDROCHLORIDE 90 MCG/MIN: 10 INJECTION INTRAVENOUS at 06:10

## 2021-01-01 RX ADMIN — Medication 10 ML: at 14:59

## 2021-01-01 RX ADMIN — INSULIN GLARGINE 23 UNITS: 100 INJECTION, SOLUTION SUBCUTANEOUS at 22:22

## 2021-01-01 RX ADMIN — PREDNISONE 20 MG: 20 TABLET ORAL at 09:04

## 2021-01-01 RX ADMIN — IPRATROPIUM BROMIDE AND ALBUTEROL SULFATE 3 ML: .5; 2.5 SOLUTION RESPIRATORY (INHALATION) at 00:35

## 2021-01-01 RX ADMIN — IPRATROPIUM BROMIDE AND ALBUTEROL 1 PUFF: 20; 100 SPRAY, METERED RESPIRATORY (INHALATION) at 20:10

## 2021-01-01 RX ADMIN — IPRATROPIUM BROMIDE AND ALBUTEROL 1 PUFF: 20; 100 SPRAY, METERED RESPIRATORY (INHALATION) at 11:41

## 2021-01-01 RX ADMIN — Medication 10 ML: at 17:13

## 2021-01-01 RX ADMIN — APIXABAN 5 MG: 5 TABLET, FILM COATED ORAL at 17:35

## 2021-01-01 RX ADMIN — ARFORMOTEROL TARTRATE 15 MCG: 15 SOLUTION RESPIRATORY (INHALATION) at 07:35

## 2021-01-01 RX ADMIN — AMIODARONE HYDROCHLORIDE 200 MG: 200 TABLET ORAL at 09:08

## 2021-01-01 RX ADMIN — NYSTATIN 500000 UNITS: 100000 SUSPENSION ORAL at 21:18

## 2021-01-01 RX ADMIN — IPRATROPIUM BROMIDE AND ALBUTEROL SULFATE 3 ML: .5; 2.5 SOLUTION RESPIRATORY (INHALATION) at 20:04

## 2021-01-01 RX ADMIN — FERROUS SULFATE TAB 325 MG (65 MG ELEMENTAL FE) 325 MG: 325 (65 FE) TAB at 09:09

## 2021-01-01 RX ADMIN — NYSTATIN 500000 UNITS: 100000 SUSPENSION ORAL at 08:50

## 2021-01-01 RX ADMIN — DILTIAZEM HYDROCHLORIDE 30 MG: 30 TABLET, FILM COATED ORAL at 12:29

## 2021-01-01 RX ADMIN — INSULIN LISPRO 1 UNITS: 100 INJECTION, SOLUTION INTRAVENOUS; SUBCUTANEOUS at 22:48

## 2021-01-01 RX ADMIN — IPRATROPIUM BROMIDE AND ALBUTEROL SULFATE 3 ML: .5; 2.5 SOLUTION RESPIRATORY (INHALATION) at 20:44

## 2021-01-01 RX ADMIN — Medication 10 ML: at 21:19

## 2021-01-01 RX ADMIN — APIXABAN 5 MG: 5 TABLET, FILM COATED ORAL at 21:53

## 2021-01-01 RX ADMIN — APIXABAN 5 MG: 5 TABLET, FILM COATED ORAL at 09:50

## 2021-01-01 RX ADMIN — ASPIRIN 81 MG: 81 TABLET, CHEWABLE ORAL at 08:56

## 2021-01-01 RX ADMIN — PANCRELIPASE 2 CAPSULE: 24000; 76000; 120000 CAPSULE, DELAYED RELEASE PELLETS ORAL at 17:06

## 2021-01-01 RX ADMIN — APIXABAN 5 MG: 5 TABLET, FILM COATED ORAL at 09:10

## 2021-01-01 RX ADMIN — IPRATROPIUM BROMIDE AND ALBUTEROL SULFATE 3 ML: .5; 2.5 SOLUTION RESPIRATORY (INHALATION) at 07:30

## 2021-01-01 RX ADMIN — APIXABAN 5 MG: 5 TABLET, FILM COATED ORAL at 21:38

## 2021-01-01 RX ADMIN — IPRATROPIUM BROMIDE AND ALBUTEROL SULFATE 3 ML: .5; 2.5 SOLUTION RESPIRATORY (INHALATION) at 07:41

## 2021-01-01 RX ADMIN — PANTOPRAZOLE SODIUM 40 MG: 40 TABLET, DELAYED RELEASE ORAL at 06:24

## 2021-01-01 RX ADMIN — PANCRELIPASE 2 CAPSULE: 24000; 76000; 120000 CAPSULE, DELAYED RELEASE PELLETS ORAL at 09:33

## 2021-01-01 RX ADMIN — SODIUM CHLORIDE 40 MG: 9 INJECTION INTRAMUSCULAR; INTRAVENOUS; SUBCUTANEOUS at 08:11

## 2021-01-01 RX ADMIN — APIXABAN 5 MG: 5 TABLET, FILM COATED ORAL at 17:20

## 2021-01-01 RX ADMIN — Medication 10 ML: at 21:38

## 2021-01-01 RX ADMIN — MONTELUKAST 10 MG: 10 TABLET, FILM COATED ORAL at 17:38

## 2021-01-01 RX ADMIN — Medication 10 ML: at 05:00

## 2021-01-01 RX ADMIN — INSULIN LISPRO 2 UNITS: 100 INJECTION, SOLUTION INTRAVENOUS; SUBCUTANEOUS at 12:47

## 2021-01-01 RX ADMIN — Medication 10 ML: at 13:00

## 2021-01-01 RX ADMIN — DULOXETINE HYDROCHLORIDE 60 MG: 30 CAPSULE, DELAYED RELEASE ORAL at 08:35

## 2021-01-01 RX ADMIN — INSULIN LISPRO 3 UNITS: 100 INJECTION, SOLUTION INTRAVENOUS; SUBCUTANEOUS at 09:12

## 2021-01-01 RX ADMIN — Medication 10 ML: at 21:53

## 2021-01-01 RX ADMIN — INSULIN GLARGINE 18 UNITS: 100 INJECTION, SOLUTION SUBCUTANEOUS at 21:58

## 2021-01-01 RX ADMIN — SODIUM CHLORIDE 40 MG: 9 INJECTION INTRAMUSCULAR; INTRAVENOUS; SUBCUTANEOUS at 21:25

## 2021-01-01 RX ADMIN — CYANOCOBALAMIN TAB 500 MCG 1000 MCG: 500 TAB at 12:01

## 2021-01-01 RX ADMIN — DULOXETINE HYDROCHLORIDE 60 MG: 30 CAPSULE, DELAYED RELEASE ORAL at 08:43

## 2021-01-01 RX ADMIN — APIXABAN 5 MG: 5 TABLET, FILM COATED ORAL at 21:46

## 2021-01-01 RX ADMIN — PANCRELIPASE 2 CAPSULE: 24000; 76000; 120000 CAPSULE, DELAYED RELEASE PELLETS ORAL at 08:57

## 2021-01-01 RX ADMIN — PANCRELIPASE 2 CAPSULE: 24000; 76000; 120000 CAPSULE, DELAYED RELEASE PELLETS ORAL at 17:00

## 2021-01-01 RX ADMIN — NYSTATIN 500000 UNITS: 100000 SUSPENSION ORAL at 12:46

## 2021-01-01 RX ADMIN — AMIODARONE HYDROCHLORIDE 400 MG: 200 TABLET ORAL at 17:54

## 2021-01-01 RX ADMIN — AZITHROMYCIN MONOHYDRATE 500 MG: 500 INJECTION, POWDER, LYOPHILIZED, FOR SOLUTION INTRAVENOUS at 21:48

## 2021-01-01 RX ADMIN — MIRTAZAPINE 15 MG: 15 TABLET, FILM COATED ORAL at 21:46

## 2021-01-01 RX ADMIN — IPRATROPIUM BROMIDE AND ALBUTEROL SULFATE 3 ML: .5; 2.5 SOLUTION RESPIRATORY (INHALATION) at 11:06

## 2021-01-01 RX ADMIN — Medication 10 ML: at 04:57

## 2021-01-01 RX ADMIN — NYSTATIN 500000 UNITS: 100000 SUSPENSION ORAL at 20:20

## 2021-01-01 RX ADMIN — ARFORMOTEROL TARTRATE 15 MCG: 15 SOLUTION RESPIRATORY (INHALATION) at 21:46

## 2021-01-01 RX ADMIN — Medication 10 ML: at 08:05

## 2021-01-01 RX ADMIN — Medication 10 ML: at 15:04

## 2021-01-01 RX ADMIN — DILTIAZEM HYDROCHLORIDE 240 MG: 240 CAPSULE, COATED, EXTENDED RELEASE ORAL at 08:23

## 2021-01-01 RX ADMIN — PANTOPRAZOLE SODIUM 40 MG: 40 TABLET, DELAYED RELEASE ORAL at 06:06

## 2021-01-01 RX ADMIN — MEROPENEM 500 MG: 500 INJECTION, POWDER, FOR SOLUTION INTRAVENOUS at 04:59

## 2021-01-01 RX ADMIN — Medication 10 ML: at 22:23

## 2021-01-01 RX ADMIN — DIGOXIN 0.12 MG: 250 TABLET ORAL at 08:22

## 2021-01-01 RX ADMIN — CYANOCOBALAMIN 1000 MCG: 1000 INJECTION, SOLUTION INTRAMUSCULAR; SUBCUTANEOUS at 12:02

## 2021-01-01 RX ADMIN — SODIUM CHLORIDE 40 MG: 9 INJECTION INTRAMUSCULAR; INTRAVENOUS; SUBCUTANEOUS at 20:19

## 2021-01-01 RX ADMIN — DIBASIC SODIUM PHOSPHATE, MONOBASIC POTASSIUM PHOSPHATE AND MONOBASIC SODIUM PHOSPHATE 1 TABLET: 852; 155; 130 TABLET ORAL at 08:13

## 2021-01-01 RX ADMIN — APIXABAN 5 MG: 5 TABLET, FILM COATED ORAL at 22:05

## 2021-01-01 RX ADMIN — DULOXETINE HYDROCHLORIDE 60 MG: 30 CAPSULE, DELAYED RELEASE ORAL at 10:05

## 2021-01-01 RX ADMIN — PANCRELIPASE 1 CAPSULE: 24000; 76000; 120000 CAPSULE, DELAYED RELEASE PELLETS ORAL at 13:42

## 2021-01-01 RX ADMIN — Medication 10 ML: at 05:27

## 2021-01-01 RX ADMIN — METOPROLOL TARTRATE 25 MG: 25 TABLET, FILM COATED ORAL at 17:58

## 2021-01-01 RX ADMIN — MONTELUKAST 10 MG: 10 TABLET, FILM COATED ORAL at 17:22

## 2021-01-01 RX ADMIN — METOPROLOL TARTRATE 25 MG: 25 TABLET, FILM COATED ORAL at 18:34

## 2021-01-01 RX ADMIN — BISACODYL 10 MG: 5 TABLET, COATED ORAL at 14:46

## 2021-01-01 RX ADMIN — PHENYLEPHRINE HYDROCHLORIDE 30 MCG/MIN: 10 INJECTION INTRAVENOUS at 22:47

## 2021-01-01 RX ADMIN — AMIODARONE HYDROCHLORIDE 0.5 MG/MIN: 50 INJECTION, SOLUTION INTRAVENOUS at 02:16

## 2021-01-01 RX ADMIN — NYSTATIN 500000 UNITS: 100000 SUSPENSION ORAL at 10:21

## 2021-01-01 RX ADMIN — MEROPENEM 500 MG: 500 INJECTION, POWDER, FOR SOLUTION INTRAVENOUS at 03:08

## 2021-01-01 RX ADMIN — DILTIAZEM HYDROCHLORIDE 60 MG: 30 TABLET, FILM COATED ORAL at 10:31

## 2021-01-01 RX ADMIN — INSULIN LISPRO 3 UNITS: 100 INJECTION, SOLUTION INTRAVENOUS; SUBCUTANEOUS at 17:35

## 2021-01-01 RX ADMIN — NYSTATIN 500000 UNITS: 100000 SUSPENSION ORAL at 21:46

## 2021-01-01 RX ADMIN — POLYETHYLENE GLYCOL 3350 17 G: 17 POWDER, FOR SOLUTION ORAL at 11:06

## 2021-01-01 RX ADMIN — IPRATROPIUM BROMIDE AND ALBUTEROL SULFATE 3 ML: .5; 2.5 SOLUTION RESPIRATORY (INHALATION) at 13:42

## 2021-01-01 RX ADMIN — TRAMADOL HYDROCHLORIDE 50 MG: 50 TABLET, FILM COATED ORAL at 10:36

## 2021-01-01 RX ADMIN — DULOXETINE HYDROCHLORIDE 60 MG: 30 CAPSULE, DELAYED RELEASE ORAL at 08:46

## 2021-01-01 RX ADMIN — IPRATROPIUM BROMIDE AND ALBUTEROL 1 PUFF: 20; 100 SPRAY, METERED RESPIRATORY (INHALATION) at 07:36

## 2021-01-01 RX ADMIN — FUROSEMIDE 40 MG: 10 INJECTION, SOLUTION INTRAVENOUS at 17:35

## 2021-01-01 RX ADMIN — INSULIN GLARGINE 18 UNITS: 100 INJECTION, SOLUTION SUBCUTANEOUS at 22:41

## 2021-01-01 RX ADMIN — ASPIRIN 81 MG: 81 TABLET, CHEWABLE ORAL at 09:10

## 2021-01-01 RX ADMIN — INSULIN GLARGINE 10 UNITS: 100 INJECTION, SOLUTION SUBCUTANEOUS at 22:42

## 2021-01-01 RX ADMIN — IPRATROPIUM BROMIDE AND ALBUTEROL SULFATE 3 ML: .5; 2.5 SOLUTION RESPIRATORY (INHALATION) at 18:36

## 2021-01-01 RX ADMIN — Medication 10 ML: at 06:25

## 2021-01-01 RX ADMIN — DILTIAZEM HYDROCHLORIDE 240 MG: 240 CAPSULE, COATED, EXTENDED RELEASE ORAL at 08:13

## 2021-01-01 RX ADMIN — FUROSEMIDE 40 MG: 40 TABLET ORAL at 08:34

## 2021-01-01 RX ADMIN — PANTOPRAZOLE SODIUM 40 MG: 40 TABLET, DELAYED RELEASE ORAL at 06:23

## 2021-01-01 RX ADMIN — IPRATROPIUM BROMIDE 0.5 MG: 0.5 SOLUTION RESPIRATORY (INHALATION) at 11:17

## 2021-01-01 RX ADMIN — APIXABAN 5 MG: 5 TABLET, FILM COATED ORAL at 09:19

## 2021-01-01 RX ADMIN — FERROUS SULFATE TAB 325 MG (65 MG ELEMENTAL FE) 325 MG: 325 (65 FE) TAB at 08:35

## 2021-01-01 RX ADMIN — NYSTATIN 500000 UNITS: 100000 SUSPENSION ORAL at 10:09

## 2021-01-01 RX ADMIN — BUDESONIDE 500 MCG: 0.5 INHALANT RESPIRATORY (INHALATION) at 20:50

## 2021-01-01 RX ADMIN — INSULIN LISPRO 6 UNITS: 100 INJECTION, SOLUTION INTRAVENOUS; SUBCUTANEOUS at 21:37

## 2021-01-01 RX ADMIN — Medication 10 ML: at 06:18

## 2021-01-01 RX ADMIN — Medication 10 ML: at 03:56

## 2021-01-01 RX ADMIN — SODIUM CHLORIDE 40 MG: 9 INJECTION INTRAMUSCULAR; INTRAVENOUS; SUBCUTANEOUS at 09:50

## 2021-01-01 RX ADMIN — PANCRELIPASE 2 CAPSULE: 24000; 76000; 120000 CAPSULE, DELAYED RELEASE PELLETS ORAL at 12:41

## 2021-01-01 RX ADMIN — IPRATROPIUM BROMIDE AND ALBUTEROL SULFATE 3 ML: .5; 2.5 SOLUTION RESPIRATORY (INHALATION) at 04:30

## 2021-01-01 RX ADMIN — IPRATROPIUM BROMIDE AND ALBUTEROL SULFATE 3 ML: .5; 2.5 SOLUTION RESPIRATORY (INHALATION) at 19:32

## 2021-01-01 RX ADMIN — PANCRELIPASE 2 CAPSULE: 24000; 76000; 120000 CAPSULE, DELAYED RELEASE PELLETS ORAL at 12:13

## 2021-01-01 RX ADMIN — AMIODARONE HYDROCHLORIDE 200 MG: 200 TABLET ORAL at 10:17

## 2021-01-01 RX ADMIN — Medication 2 TABLET: at 08:07

## 2021-01-01 RX ADMIN — Medication 10 ML: at 21:43

## 2021-01-01 RX ADMIN — INSULIN LISPRO 2 UNITS: 100 INJECTION, SOLUTION INTRAVENOUS; SUBCUTANEOUS at 22:10

## 2021-01-01 RX ADMIN — NYSTATIN 500000 UNITS: 100000 SUSPENSION ORAL at 21:42

## 2021-01-01 RX ADMIN — APIXABAN 5 MG: 5 TABLET, FILM COATED ORAL at 21:28

## 2021-01-01 RX ADMIN — NYSTATIN 500000 UNITS: 100000 SUSPENSION ORAL at 09:10

## 2021-01-01 RX ADMIN — DEXTROSE MONOHYDRATE 25 G: 25 INJECTION, SOLUTION INTRAVENOUS at 13:09

## 2021-01-01 RX ADMIN — NYSTATIN 500000 UNITS: 100000 SUSPENSION ORAL at 21:28

## 2021-01-01 RX ADMIN — PANTOPRAZOLE SODIUM 40 MG: 40 TABLET, DELAYED RELEASE ORAL at 05:21

## 2021-01-01 RX ADMIN — AMIODARONE HYDROCHLORIDE 1 MG/MIN: 50 INJECTION, SOLUTION INTRAVENOUS at 19:55

## 2021-01-01 RX ADMIN — CYANOCOBALAMIN TAB 500 MCG 1000 MCG: 500 TAB at 09:33

## 2021-01-01 RX ADMIN — APIXABAN 5 MG: 5 TABLET, FILM COATED ORAL at 08:07

## 2021-01-01 RX ADMIN — Medication 10 ML: at 22:00

## 2021-01-01 RX ADMIN — APIXABAN 5 MG: 5 TABLET, FILM COATED ORAL at 08:24

## 2021-01-01 RX ADMIN — DIBASIC SODIUM PHOSPHATE, MONOBASIC POTASSIUM PHOSPHATE AND MONOBASIC SODIUM PHOSPHATE 1 TABLET: 852; 155; 130 TABLET ORAL at 10:21

## 2021-01-01 RX ADMIN — DILTIAZEM HYDROCHLORIDE 5 MG: 5 INJECTION INTRAVENOUS at 20:27

## 2021-01-01 RX ADMIN — METOPROLOL TARTRATE 25 MG: 25 TABLET, FILM COATED ORAL at 20:18

## 2021-01-01 RX ADMIN — Medication 10 ML: at 23:47

## 2021-01-01 RX ADMIN — PREDNISONE 20 MG: 20 TABLET ORAL at 08:24

## 2021-01-01 RX ADMIN — FOLIC ACID 1 MG: 1 TABLET ORAL at 08:57

## 2021-01-01 RX ADMIN — METOPROLOL TARTRATE 25 MG: 25 TABLET, FILM COATED ORAL at 09:26

## 2021-01-01 RX ADMIN — FERROUS SULFATE TAB 325 MG (65 MG ELEMENTAL FE) 325 MG: 325 (65 FE) TAB at 09:05

## 2021-01-01 RX ADMIN — CEFTRIAXONE 1 G: 1 INJECTION, POWDER, FOR SOLUTION INTRAMUSCULAR; INTRAVENOUS at 16:12

## 2021-01-01 RX ADMIN — LOPERAMIDE HYDROCHLORIDE 2 MG: 2 CAPSULE ORAL at 08:46

## 2021-01-01 RX ADMIN — MONTELUKAST 10 MG: 10 TABLET, FILM COATED ORAL at 22:01

## 2021-01-01 RX ADMIN — APIXABAN 5 MG: 5 TABLET, FILM COATED ORAL at 09:22

## 2021-01-01 RX ADMIN — FUROSEMIDE 20 MG: 10 INJECTION, SOLUTION INTRAMUSCULAR; INTRAVENOUS at 19:06

## 2021-01-01 RX ADMIN — AMIODARONE HYDROCHLORIDE 400 MG: 200 TABLET ORAL at 17:23

## 2021-01-01 RX ADMIN — Medication 10 ML: at 16:10

## 2021-01-01 RX ADMIN — ONDANSETRON 4 MG: 2 INJECTION INTRAMUSCULAR; INTRAVENOUS at 02:20

## 2021-01-01 RX ADMIN — ARFORMOTEROL TARTRATE 15 MCG: 15 SOLUTION RESPIRATORY (INHALATION) at 20:17

## 2021-01-01 RX ADMIN — NYSTATIN 500000 UNITS: 100000 SUSPENSION ORAL at 13:39

## 2021-01-01 RX ADMIN — IPRATROPIUM BROMIDE AND ALBUTEROL SULFATE 3 ML: .5; 2.5 SOLUTION RESPIRATORY (INHALATION) at 08:08

## 2021-01-01 RX ADMIN — Medication 10 ML: at 21:18

## 2021-01-01 RX ADMIN — DIBASIC SODIUM PHOSPHATE, MONOBASIC POTASSIUM PHOSPHATE AND MONOBASIC SODIUM PHOSPHATE 1 TABLET: 852; 155; 130 TABLET ORAL at 17:54

## 2021-01-01 RX ADMIN — IPRATROPIUM BROMIDE AND ALBUTEROL SULFATE 3 ML: .5; 2.5 SOLUTION RESPIRATORY (INHALATION) at 20:07

## 2021-01-01 RX ADMIN — INSULIN LISPRO 2 UNITS: 100 INJECTION, SOLUTION INTRAVENOUS; SUBCUTANEOUS at 21:46

## 2021-01-01 RX ADMIN — MONTELUKAST 10 MG: 10 TABLET, FILM COATED ORAL at 21:58

## 2021-01-01 RX ADMIN — NYSTATIN 500000 UNITS: 100000 SUSPENSION ORAL at 09:12

## 2021-01-01 RX ADMIN — DILTIAZEM HYDROCHLORIDE 240 MG: 120 CAPSULE, COATED, EXTENDED RELEASE ORAL at 08:35

## 2021-01-01 RX ADMIN — ONDANSETRON 4 MG: 2 INJECTION INTRAMUSCULAR; INTRAVENOUS at 12:26

## 2021-01-01 RX ADMIN — Medication 10 ML: at 05:25

## 2021-01-01 RX ADMIN — IPRATROPIUM BROMIDE AND ALBUTEROL SULFATE 3 ML: .5; 2.5 SOLUTION RESPIRATORY (INHALATION) at 21:40

## 2021-01-01 RX ADMIN — IPRATROPIUM BROMIDE AND ALBUTEROL SULFATE 3 ML: .5; 2.5 SOLUTION RESPIRATORY (INHALATION) at 20:02

## 2021-01-01 RX ADMIN — NYSTATIN 500000 UNITS: 100000 SUSPENSION ORAL at 17:15

## 2021-01-01 RX ADMIN — Medication 10 ML: at 06:23

## 2021-01-01 RX ADMIN — POTASSIUM CHLORIDE 20 MEQ: 750 TABLET, FILM COATED, EXTENDED RELEASE ORAL at 17:21

## 2021-01-01 RX ADMIN — NYSTATIN 500000 UNITS: 100000 SUSPENSION ORAL at 17:39

## 2021-01-01 RX ADMIN — NYSTATIN 500000 UNITS: 100000 SUSPENSION ORAL at 17:46

## 2021-01-01 RX ADMIN — POTASSIUM CHLORIDE 20 MEQ: 750 TABLET, FILM COATED, EXTENDED RELEASE ORAL at 09:29

## 2021-01-01 RX ADMIN — ACETAMINOPHEN 650 MG: 325 TABLET ORAL at 09:24

## 2021-01-01 RX ADMIN — INSULIN GLARGINE 18 UNITS: 100 INJECTION, SOLUTION SUBCUTANEOUS at 21:29

## 2021-01-01 RX ADMIN — INSULIN LISPRO 2 UNITS: 100 INJECTION, SOLUTION INTRAVENOUS; SUBCUTANEOUS at 12:35

## 2021-01-01 RX ADMIN — AMIODARONE HYDROCHLORIDE 200 MG: 200 TABLET ORAL at 09:03

## 2021-01-01 RX ADMIN — PANCRELIPASE 1 CAPSULE: 24000; 76000; 120000 CAPSULE, DELAYED RELEASE PELLETS ORAL at 17:22

## 2021-01-01 RX ADMIN — PANTOPRAZOLE SODIUM 40 MG: 40 TABLET, DELAYED RELEASE ORAL at 05:45

## 2021-01-01 RX ADMIN — PANCRELIPASE 1 CAPSULE: 24000; 76000; 120000 CAPSULE, DELAYED RELEASE PELLETS ORAL at 08:07

## 2021-01-01 RX ADMIN — NYSTATIN 500000 UNITS: 100000 SUSPENSION ORAL at 17:33

## 2021-01-01 RX ADMIN — INSULIN LISPRO 2 UNITS: 100 INJECTION, SOLUTION INTRAVENOUS; SUBCUTANEOUS at 11:48

## 2021-01-01 RX ADMIN — APIXABAN 5 MG: 5 TABLET, FILM COATED ORAL at 09:12

## 2021-01-01 RX ADMIN — Medication 10 ML: at 18:03

## 2021-01-01 RX ADMIN — IPRATROPIUM BROMIDE AND ALBUTEROL 1 PUFF: 20; 100 SPRAY, METERED RESPIRATORY (INHALATION) at 07:34

## 2021-01-01 RX ADMIN — Medication 10 ML: at 14:44

## 2021-01-01 RX ADMIN — MIRTAZAPINE 15 MG: 15 TABLET, FILM COATED ORAL at 22:22

## 2021-01-01 RX ADMIN — LOPERAMIDE HYDROCHLORIDE 2 MG: 2 CAPSULE ORAL at 17:35

## 2021-01-01 RX ADMIN — IPRATROPIUM BROMIDE AND ALBUTEROL SULFATE 3 ML: .5; 2.5 SOLUTION RESPIRATORY (INHALATION) at 20:05

## 2021-01-01 RX ADMIN — PANCRELIPASE 2 CAPSULE: 24000; 76000; 120000 CAPSULE, DELAYED RELEASE PELLETS ORAL at 16:12

## 2021-01-01 RX ADMIN — NYSTATIN 500000 UNITS: 100000 SUSPENSION ORAL at 13:16

## 2021-01-01 RX ADMIN — DULOXETINE HYDROCHLORIDE 60 MG: 30 CAPSULE, DELAYED RELEASE ORAL at 08:24

## 2021-01-01 RX ADMIN — APIXABAN 5 MG: 5 TABLET, FILM COATED ORAL at 08:50

## 2021-01-01 RX ADMIN — BISACODYL 10 MG: 5 TABLET, COATED ORAL at 15:47

## 2021-01-01 RX ADMIN — ONDANSETRON 8 MG: 4 TABLET, ORALLY DISINTEGRATING ORAL at 10:36

## 2021-01-01 RX ADMIN — IPRATROPIUM BROMIDE AND ALBUTEROL 1 PUFF: 20; 100 SPRAY, METERED RESPIRATORY (INHALATION) at 15:38

## 2021-01-01 RX ADMIN — CYANOCOBALAMIN TAB 500 MCG 1000 MCG: 500 TAB at 09:11

## 2021-01-01 RX ADMIN — LIDOCAINE HYDROCHLORIDE 80 MG: 20 INJECTION, SOLUTION INTRAVENOUS at 09:00

## 2021-01-01 RX ADMIN — INSULIN LISPRO 2 UNITS: 100 INJECTION, SOLUTION INTRAVENOUS; SUBCUTANEOUS at 18:02

## 2021-01-01 RX ADMIN — Medication 2000 UNITS: at 09:07

## 2021-01-01 RX ADMIN — BUDESONIDE 500 MCG: 0.5 INHALANT RESPIRATORY (INHALATION) at 07:19

## 2021-01-01 RX ADMIN — Medication 2000 UNITS: at 09:08

## 2021-01-01 RX ADMIN — AMIODARONE HYDROCHLORIDE 200 MG: 200 TABLET ORAL at 08:35

## 2021-01-01 RX ADMIN — ONDANSETRON 4 MG: 2 INJECTION INTRAMUSCULAR; INTRAVENOUS at 09:11

## 2021-01-01 RX ADMIN — APIXABAN 5 MG: 5 TABLET, FILM COATED ORAL at 23:40

## 2021-01-01 RX ADMIN — PANTOPRAZOLE SODIUM 40 MG: 40 TABLET, DELAYED RELEASE ORAL at 05:26

## 2021-01-01 RX ADMIN — SODIUM CHLORIDE 40 MG: 9 INJECTION INTRAMUSCULAR; INTRAVENOUS; SUBCUTANEOUS at 20:06

## 2021-01-01 RX ADMIN — DILTIAZEM HYDROCHLORIDE 60 MG: 30 TABLET, FILM COATED ORAL at 17:13

## 2021-01-01 RX ADMIN — PANTOPRAZOLE SODIUM 40 MG: 40 TABLET, DELAYED RELEASE ORAL at 06:59

## 2021-01-01 RX ADMIN — Medication 10 ML: at 06:35

## 2021-01-01 RX ADMIN — Medication 10 ML: at 06:26

## 2021-01-01 RX ADMIN — DULOXETINE HYDROCHLORIDE 60 MG: 30 CAPSULE, DELAYED RELEASE ORAL at 09:05

## 2021-01-01 RX ADMIN — DIGOXIN 0.12 MG: 125 TABLET ORAL at 21:59

## 2021-01-01 RX ADMIN — PANCRELIPASE 1 CAPSULE: 24000; 76000; 120000 CAPSULE, DELAYED RELEASE PELLETS ORAL at 17:52

## 2021-01-01 RX ADMIN — Medication 10 ML: at 14:06

## 2021-01-01 RX ADMIN — IPRATROPIUM BROMIDE 0.5 MG: 0.5 SOLUTION RESPIRATORY (INHALATION) at 15:14

## 2021-01-01 RX ADMIN — NYSTATIN 500000 UNITS: 100000 SUSPENSION ORAL at 08:24

## 2021-01-01 RX ADMIN — NYSTATIN 500000 UNITS: 100000 SUSPENSION ORAL at 20:56

## 2021-01-01 RX ADMIN — NYSTATIN 500000 UNITS: 100000 SUSPENSION ORAL at 22:19

## 2021-01-01 RX ADMIN — IRON SUCROSE 100 MG: 20 INJECTION, SOLUTION INTRAVENOUS at 15:48

## 2021-01-01 RX ADMIN — IPRATROPIUM BROMIDE AND ALBUTEROL SULFATE 3 ML: .5; 2.5 SOLUTION RESPIRATORY (INHALATION) at 15:16

## 2021-01-01 RX ADMIN — Medication 2000 UNITS: at 08:14

## 2021-01-01 RX ADMIN — Medication 10 ML: at 22:48

## 2021-01-01 RX ADMIN — MIRTAZAPINE 15 MG: 15 TABLET, FILM COATED ORAL at 21:38

## 2021-01-01 RX ADMIN — MONTELUKAST 10 MG: 10 TABLET, FILM COATED ORAL at 17:52

## 2021-01-01 RX ADMIN — APIXABAN 5 MG: 5 TABLET, FILM COATED ORAL at 18:31

## 2021-01-01 RX ADMIN — PREDNISONE 20 MG: 20 TABLET ORAL at 09:12

## 2021-01-01 RX ADMIN — CYANOCOBALAMIN TAB 500 MCG 1000 MCG: 500 TAB at 09:28

## 2021-01-01 RX ADMIN — MORPHINE SULFATE 1 MG: 2 INJECTION, SOLUTION INTRAMUSCULAR; INTRAVENOUS at 12:37

## 2021-01-01 RX ADMIN — DIBASIC SODIUM PHOSPHATE, MONOBASIC POTASSIUM PHOSPHATE AND MONOBASIC SODIUM PHOSPHATE 1 TABLET: 852; 155; 130 TABLET ORAL at 08:47

## 2021-01-01 RX ADMIN — CYANOCOBALAMIN TAB 500 MCG 1000 MCG: 500 TAB at 08:46

## 2021-01-01 RX ADMIN — NYSTATIN 500000 UNITS: 100000 SUSPENSION ORAL at 09:33

## 2021-01-01 RX ADMIN — SODIUM CHLORIDE 40 MG: 9 INJECTION INTRAMUSCULAR; INTRAVENOUS; SUBCUTANEOUS at 09:19

## 2021-01-01 RX ADMIN — SODIUM CHLORIDE 5 MG/HR: 900 INJECTION, SOLUTION INTRAVENOUS at 04:54

## 2021-01-01 RX ADMIN — ARFORMOTEROL TARTRATE 15 MCG: 15 SOLUTION RESPIRATORY (INHALATION) at 07:19

## 2021-01-01 RX ADMIN — PANCRELIPASE 2 CAPSULE: 24000; 76000; 120000 CAPSULE, DELAYED RELEASE PELLETS ORAL at 12:45

## 2021-01-01 RX ADMIN — AMIODARONE HYDROCHLORIDE 0.5 MG/MIN: 50 INJECTION, SOLUTION INTRAVENOUS at 08:54

## 2021-01-01 RX ADMIN — MIRTAZAPINE 15 MG: 15 TABLET, FILM COATED ORAL at 21:29

## 2021-01-01 RX ADMIN — NYSTATIN 500000 UNITS: 100000 SUSPENSION ORAL at 12:29

## 2021-01-01 RX ADMIN — PANCRELIPASE 1 CAPSULE: 24000; 76000; 120000 CAPSULE, DELAYED RELEASE PELLETS ORAL at 11:33

## 2021-01-01 RX ADMIN — IPRATROPIUM BROMIDE AND ALBUTEROL 1 PUFF: 20; 100 SPRAY, METERED RESPIRATORY (INHALATION) at 15:49

## 2021-01-01 RX ADMIN — PANCRELIPASE 1 CAPSULE: 24000; 76000; 120000 CAPSULE, DELAYED RELEASE PELLETS ORAL at 13:10

## 2021-01-01 RX ADMIN — INSULIN LISPRO 5 UNITS: 100 INJECTION, SOLUTION INTRAVENOUS; SUBCUTANEOUS at 17:21

## 2021-01-01 RX ADMIN — MONTELUKAST 10 MG: 10 TABLET, FILM COATED ORAL at 20:56

## 2021-01-01 RX ADMIN — DULOXETINE HYDROCHLORIDE 60 MG: 30 CAPSULE, DELAYED RELEASE ORAL at 12:02

## 2021-01-01 RX ADMIN — PANCRELIPASE 1 CAPSULE: 24000; 76000; 120000 CAPSULE, DELAYED RELEASE PELLETS ORAL at 13:03

## 2021-01-01 RX ADMIN — IPRATROPIUM BROMIDE AND ALBUTEROL SULFATE 3 ML: .5; 2.5 SOLUTION RESPIRATORY (INHALATION) at 08:13

## 2021-01-01 RX ADMIN — PANTOPRAZOLE SODIUM 40 MG: 40 TABLET, DELAYED RELEASE ORAL at 05:53

## 2021-01-01 RX ADMIN — ONDANSETRON 4 MG: 2 INJECTION INTRAMUSCULAR; INTRAVENOUS at 18:33

## 2021-01-01 RX ADMIN — POTASSIUM CHLORIDE 20 MEQ: 750 TABLET, FILM COATED, EXTENDED RELEASE ORAL at 17:14

## 2021-01-01 RX ADMIN — Medication 2000 UNITS: at 09:03

## 2021-01-01 RX ADMIN — MEROPENEM 500 MG: 500 INJECTION, POWDER, FOR SOLUTION INTRAVENOUS at 12:29

## 2021-01-01 RX ADMIN — METOPROLOL TARTRATE 25 MG: 25 TABLET, FILM COATED ORAL at 03:24

## 2021-01-01 RX ADMIN — INSULIN LISPRO 2 UNITS: 100 INJECTION, SOLUTION INTRAVENOUS; SUBCUTANEOUS at 21:58

## 2021-01-01 RX ADMIN — MONTELUKAST 10 MG: 10 TABLET, FILM COATED ORAL at 17:35

## 2021-01-01 RX ADMIN — APIXABAN 5 MG: 5 TABLET, FILM COATED ORAL at 19:04

## 2021-01-01 RX ADMIN — DULOXETINE HYDROCHLORIDE 60 MG: 30 CAPSULE, DELAYED RELEASE ORAL at 09:07

## 2021-01-01 RX ADMIN — IPRATROPIUM BROMIDE AND ALBUTEROL SULFATE 3 ML: .5; 2.5 SOLUTION RESPIRATORY (INHALATION) at 18:28

## 2021-01-01 RX ADMIN — INSULIN LISPRO 3 UNITS: 100 INJECTION, SOLUTION INTRAVENOUS; SUBCUTANEOUS at 21:29

## 2021-01-01 RX ADMIN — PANCRELIPASE 2 CAPSULE: 24000; 76000; 120000 CAPSULE, DELAYED RELEASE PELLETS ORAL at 17:14

## 2021-01-01 RX ADMIN — INSULIN LISPRO 3 UNITS: 100 INJECTION, SOLUTION INTRAVENOUS; SUBCUTANEOUS at 17:55

## 2021-01-01 RX ADMIN — SODIUM CHLORIDE 40 MG: 9 INJECTION INTRAMUSCULAR; INTRAVENOUS; SUBCUTANEOUS at 09:12

## 2021-01-01 RX ADMIN — Medication 10 ML: at 05:50

## 2021-01-01 RX ADMIN — DEXTROSE MONOHYDRATE 25 G: 25 INJECTION, SOLUTION INTRAVENOUS at 17:22

## 2021-01-01 RX ADMIN — PREDNISONE 20 MG: 20 TABLET ORAL at 09:29

## 2021-01-01 RX ADMIN — AMIODARONE HYDROCHLORIDE 200 MG: 200 TABLET ORAL at 09:33

## 2021-01-01 RX ADMIN — BUDESONIDE 500 MCG: 0.5 INHALANT RESPIRATORY (INHALATION) at 19:57

## 2021-01-01 RX ADMIN — Medication 2 TABLET: at 10:21

## 2021-01-01 RX ADMIN — INSULIN LISPRO 3 UNITS: 100 INJECTION, SOLUTION INTRAVENOUS; SUBCUTANEOUS at 16:26

## 2021-01-01 RX ADMIN — IPRATROPIUM BROMIDE AND ALBUTEROL SULFATE 3 ML: .5; 2.5 SOLUTION RESPIRATORY (INHALATION) at 14:08

## 2021-01-01 RX ADMIN — IPRATROPIUM BROMIDE AND ALBUTEROL SULFATE 3 ML: .5; 2.5 SOLUTION RESPIRATORY (INHALATION) at 15:19

## 2021-01-01 RX ADMIN — INSULIN GLARGINE 23 UNITS: 100 INJECTION, SOLUTION SUBCUTANEOUS at 22:45

## 2021-01-01 RX ADMIN — SODIUM CHLORIDE 5 MG/HR: 900 INJECTION, SOLUTION INTRAVENOUS at 18:24

## 2021-01-01 RX ADMIN — IPRATROPIUM BROMIDE AND ALBUTEROL SULFATE 3 ML: .5; 2.5 SOLUTION RESPIRATORY (INHALATION) at 07:38

## 2021-01-01 RX ADMIN — INSULIN LISPRO 2 UNITS: 100 INJECTION, SOLUTION INTRAVENOUS; SUBCUTANEOUS at 08:14

## 2021-01-01 RX ADMIN — Medication 2000 UNITS: at 08:57

## 2021-01-01 RX ADMIN — IOPAMIDOL 100 ML: 755 INJECTION, SOLUTION INTRAVENOUS at 12:13

## 2021-01-01 RX ADMIN — Medication 10 ML: at 22:22

## 2021-01-01 RX ADMIN — FERROUS SULFATE TAB 325 MG (65 MG ELEMENTAL FE) 325 MG: 325 (65 FE) TAB at 09:33

## 2021-01-01 RX ADMIN — INSULIN GLARGINE 23 UNITS: 100 INJECTION, SOLUTION SUBCUTANEOUS at 21:42

## 2021-01-01 RX ADMIN — LOPERAMIDE HYDROCHLORIDE 2 MG: 2 CAPSULE ORAL at 17:46

## 2021-01-01 RX ADMIN — METOPROLOL TARTRATE 25 MG: 25 TABLET, FILM COATED ORAL at 08:50

## 2021-01-01 RX ADMIN — NYSTATIN 500000 UNITS: 100000 SUSPENSION ORAL at 21:06

## 2021-01-01 RX ADMIN — Medication 10 ML: at 06:14

## 2021-01-01 RX ADMIN — METOPROLOL TARTRATE 25 MG: 25 TABLET, FILM COATED ORAL at 20:19

## 2021-01-01 RX ADMIN — IPRATROPIUM BROMIDE 0.5 MG: 0.5 SOLUTION RESPIRATORY (INHALATION) at 19:54

## 2021-01-01 RX ADMIN — IPRATROPIUM BROMIDE AND ALBUTEROL SULFATE 3 ML: .5; 2.5 SOLUTION RESPIRATORY (INHALATION) at 20:52

## 2021-01-01 RX ADMIN — DILTIAZEM HYDROCHLORIDE 30 MG: 30 TABLET, FILM COATED ORAL at 07:14

## 2021-01-01 RX ADMIN — ACETAMINOPHEN 650 MG: 325 TABLET ORAL at 05:52

## 2021-01-01 RX ADMIN — APIXABAN 5 MG: 5 TABLET, FILM COATED ORAL at 17:23

## 2021-01-01 RX ADMIN — APIXABAN 5 MG: 5 TABLET, FILM COATED ORAL at 09:09

## 2021-01-01 RX ADMIN — IPRATROPIUM BROMIDE AND ALBUTEROL 1 PUFF: 20; 100 SPRAY, METERED RESPIRATORY (INHALATION) at 20:28

## 2021-01-01 RX ADMIN — IPRATROPIUM BROMIDE AND ALBUTEROL SULFATE 3 ML: .5; 2.5 SOLUTION RESPIRATORY (INHALATION) at 00:38

## 2021-01-01 RX ADMIN — DILTIAZEM HYDROCHLORIDE 60 MG: 30 TABLET, FILM COATED ORAL at 05:53

## 2021-01-01 RX ADMIN — ARFORMOTEROL TARTRATE 15 MCG: 15 SOLUTION RESPIRATORY (INHALATION) at 18:34

## 2021-01-01 RX ADMIN — POTASSIUM CHLORIDE 40 MEQ: 750 TABLET, FILM COATED, EXTENDED RELEASE ORAL at 18:31

## 2021-01-01 RX ADMIN — Medication 2 TABLET: at 08:47

## 2021-01-01 RX ADMIN — PANTOPRAZOLE SODIUM 40 MG: 40 TABLET, DELAYED RELEASE ORAL at 08:13

## 2021-01-01 RX ADMIN — Medication 10 ML: at 07:14

## 2021-01-01 RX ADMIN — SODIUM CHLORIDE 40 MG: 9 INJECTION INTRAMUSCULAR; INTRAVENOUS; SUBCUTANEOUS at 21:28

## 2021-01-01 RX ADMIN — SODIUM CHLORIDE: 9 INJECTION, SOLUTION INTRAVENOUS at 08:30

## 2021-01-01 RX ADMIN — IPRATROPIUM BROMIDE AND ALBUTEROL SULFATE 3 ML: .5; 2.5 SOLUTION RESPIRATORY (INHALATION) at 11:42

## 2021-01-01 RX ADMIN — APIXABAN 5 MG: 5 TABLET, FILM COATED ORAL at 22:00

## 2021-01-01 RX ADMIN — IPRATROPIUM BROMIDE AND ALBUTEROL SULFATE 3 ML: .5; 2.5 SOLUTION RESPIRATORY (INHALATION) at 16:35

## 2021-01-01 RX ADMIN — BISACODYL 10 MG: 5 TABLET, COATED ORAL at 10:19

## 2021-01-01 RX ADMIN — MIRTAZAPINE 15 MG: 15 TABLET, FILM COATED ORAL at 21:42

## 2021-01-01 RX ADMIN — PREDNISONE 20 MG: 20 TABLET ORAL at 09:33

## 2021-01-01 RX ADMIN — NYSTATIN 500000 UNITS: 100000 SUSPENSION ORAL at 09:15

## 2021-01-01 RX ADMIN — PANCRELIPASE 2 CAPSULE: 24000; 76000; 120000 CAPSULE, DELAYED RELEASE PELLETS ORAL at 12:44

## 2021-01-01 RX ADMIN — ACETAMINOPHEN 650 MG: 325 TABLET ORAL at 04:07

## 2021-01-01 RX ADMIN — NYSTATIN 500000 UNITS: 100000 SUSPENSION ORAL at 08:12

## 2021-01-01 RX ADMIN — APIXABAN 5 MG: 5 TABLET, FILM COATED ORAL at 08:58

## 2021-01-01 RX ADMIN — ARFORMOTEROL TARTRATE 15 MCG: 15 SOLUTION RESPIRATORY (INHALATION) at 20:07

## 2021-01-01 RX ADMIN — PREDNISONE 20 MG: 20 TABLET ORAL at 09:05

## 2021-01-01 RX ADMIN — METOPROLOL TARTRATE 25 MG: 25 TABLET, FILM COATED ORAL at 03:39

## 2021-01-01 RX ADMIN — PANTOPRAZOLE SODIUM 40 MG: 40 TABLET, DELAYED RELEASE ORAL at 17:29

## 2021-01-01 RX ADMIN — IPRATROPIUM BROMIDE AND ALBUTEROL SULFATE 3 ML: .5; 2.5 SOLUTION RESPIRATORY (INHALATION) at 07:49

## 2021-01-01 RX ADMIN — DIBASIC SODIUM PHOSPHATE, MONOBASIC POTASSIUM PHOSPHATE AND MONOBASIC SODIUM PHOSPHATE 1 TABLET: 852; 155; 130 TABLET ORAL at 11:00

## 2021-01-01 RX ADMIN — IPRATROPIUM BROMIDE AND ALBUTEROL SULFATE 3 ML: .5; 2.5 SOLUTION RESPIRATORY (INHALATION) at 07:47

## 2021-01-01 RX ADMIN — MONTELUKAST 10 MG: 10 TABLET, FILM COATED ORAL at 17:03

## 2021-01-01 RX ADMIN — Medication 10 ML: at 20:24

## 2021-01-01 RX ADMIN — NYSTATIN 500000 UNITS: 100000 SUSPENSION ORAL at 18:02

## 2021-01-01 RX ADMIN — INSULIN LISPRO 2 UNITS: 100 INJECTION, SOLUTION INTRAVENOUS; SUBCUTANEOUS at 16:17

## 2021-01-01 RX ADMIN — MIRTAZAPINE 15 MG: 15 TABLET, FILM COATED ORAL at 21:52

## 2021-01-01 RX ADMIN — ONDANSETRON 4 MG: 2 INJECTION INTRAMUSCULAR; INTRAVENOUS at 18:58

## 2021-01-01 RX ADMIN — APIXABAN 5 MG: 5 TABLET, FILM COATED ORAL at 09:33

## 2021-01-01 RX ADMIN — CYANOCOBALAMIN TAB 500 MCG 1000 MCG: 500 TAB at 08:55

## 2021-01-01 RX ADMIN — IPRATROPIUM BROMIDE AND ALBUTEROL 1 PUFF: 20; 100 SPRAY, METERED RESPIRATORY (INHALATION) at 08:49

## 2021-01-01 RX ADMIN — MIRTAZAPINE 15 MG: 15 TABLET, FILM COATED ORAL at 21:05

## 2021-01-01 RX ADMIN — DILTIAZEM HYDROCHLORIDE 240 MG: 240 CAPSULE, COATED, EXTENDED RELEASE ORAL at 09:05

## 2021-01-01 RX ADMIN — APIXABAN 5 MG: 5 TABLET, FILM COATED ORAL at 09:03

## 2021-01-01 RX ADMIN — DIBASIC SODIUM PHOSPHATE, MONOBASIC POTASSIUM PHOSPHATE AND MONOBASIC SODIUM PHOSPHATE 1 TABLET: 852; 155; 130 TABLET ORAL at 09:22

## 2021-01-01 RX ADMIN — IPRATROPIUM BROMIDE AND ALBUTEROL SULFATE 3 ML: .5; 2.5 SOLUTION RESPIRATORY (INHALATION) at 07:35

## 2021-01-01 RX ADMIN — NYSTATIN 500000 UNITS: 100000 SUSPENSION ORAL at 18:31

## 2021-01-01 RX ADMIN — AMIODARONE HYDROCHLORIDE 200 MG: 200 TABLET ORAL at 08:56

## 2021-01-01 RX ADMIN — POTASSIUM CHLORIDE 20 MEQ: 750 TABLET, FILM COATED, EXTENDED RELEASE ORAL at 17:18

## 2021-01-01 RX ADMIN — CEFTRIAXONE 1 G: 1 INJECTION, POWDER, FOR SOLUTION INTRAMUSCULAR; INTRAVENOUS at 14:58

## 2021-01-01 RX ADMIN — METOPROLOL TARTRATE 25 MG: 25 TABLET, FILM COATED ORAL at 02:20

## 2021-01-01 RX ADMIN — IPRATROPIUM BROMIDE AND ALBUTEROL SULFATE 3 ML: .5; 2.5 SOLUTION RESPIRATORY (INHALATION) at 15:09

## 2021-01-01 RX ADMIN — CYANOCOBALAMIN TAB 500 MCG 1000 MCG: 500 TAB at 08:21

## 2021-01-01 RX ADMIN — PREDNISONE 20 MG: 20 TABLET ORAL at 08:21

## 2021-01-01 RX ADMIN — PANTOPRAZOLE SODIUM 40 MG: 40 TABLET, DELAYED RELEASE ORAL at 06:35

## 2021-01-01 RX ADMIN — ARFORMOTEROL TARTRATE 15 MCG: 15 SOLUTION RESPIRATORY (INHALATION) at 07:52

## 2021-01-01 RX ADMIN — FERROUS SULFATE TAB 325 MG (65 MG ELEMENTAL FE) 325 MG: 325 (65 FE) TAB at 08:55

## 2021-01-01 RX ADMIN — Medication 2 TABLET: at 09:22

## 2021-01-01 RX ADMIN — PANCRELIPASE 2 CAPSULE: 24000; 76000; 120000 CAPSULE, DELAYED RELEASE PELLETS ORAL at 17:54

## 2021-01-01 RX ADMIN — ACETAMINOPHEN 650 MG: 325 TABLET ORAL at 03:08

## 2021-01-01 RX ADMIN — Medication 10 ML: at 21:05

## 2021-01-01 RX ADMIN — Medication 10 ML: at 05:21

## 2021-01-01 RX ADMIN — NYSTATIN 500000 UNITS: 100000 SUSPENSION ORAL at 09:22

## 2021-01-01 RX ADMIN — FUROSEMIDE 40 MG: 10 INJECTION, SOLUTION INTRAMUSCULAR; INTRAVENOUS at 06:37

## 2021-01-01 RX ADMIN — BUDESONIDE 500 MCG: 0.5 INHALANT RESPIRATORY (INHALATION) at 08:29

## 2021-01-01 RX ADMIN — APIXABAN 5 MG: 5 TABLET, FILM COATED ORAL at 20:56

## 2021-01-01 RX ADMIN — AMIODARONE HYDROCHLORIDE 400 MG: 200 TABLET ORAL at 09:12

## 2021-01-01 RX ADMIN — PANTOPRAZOLE SODIUM 40 MG: 40 TABLET, DELAYED RELEASE ORAL at 05:22

## 2021-01-01 RX ADMIN — NYSTATIN 500000 UNITS: 100000 SUSPENSION ORAL at 12:41

## 2021-01-01 RX ADMIN — PREDNISONE 20 MG: 20 TABLET ORAL at 08:43

## 2021-01-01 RX ADMIN — Medication 10 ML: at 15:51

## 2021-01-01 RX ADMIN — PHENYLEPHRINE HYDROCHLORIDE 95 MCG/MIN: 10 INJECTION INTRAVENOUS at 12:31

## 2021-01-01 RX ADMIN — MONTELUKAST 10 MG: 10 TABLET, FILM COATED ORAL at 18:31

## 2021-01-01 RX ADMIN — Medication 4 G: at 12:05

## 2021-01-01 RX ADMIN — PANCRELIPASE 1 CAPSULE: 24000; 76000; 120000 CAPSULE, DELAYED RELEASE PELLETS ORAL at 17:14

## 2021-01-01 RX ADMIN — PANCRELIPASE 2 CAPSULE: 24000; 76000; 120000 CAPSULE, DELAYED RELEASE PELLETS ORAL at 16:30

## 2021-01-01 RX ADMIN — NYSTATIN 500000 UNITS: 100000 SUSPENSION ORAL at 08:08

## 2021-01-01 RX ADMIN — INSULIN GLARGINE 23 UNITS: 100 INJECTION, SOLUTION SUBCUTANEOUS at 21:56

## 2021-01-01 RX ADMIN — DULOXETINE HYDROCHLORIDE 60 MG: 30 CAPSULE, DELAYED RELEASE ORAL at 09:08

## 2021-01-01 RX ADMIN — BUDESONIDE 500 MCG: 0.5 INHALANT RESPIRATORY (INHALATION) at 11:37

## 2021-01-01 RX ADMIN — INSULIN GLARGINE 18 UNITS: 100 INJECTION, SOLUTION SUBCUTANEOUS at 21:06

## 2021-01-01 RX ADMIN — NYSTATIN 500000 UNITS: 100000 SUSPENSION ORAL at 17:01

## 2021-01-01 RX ADMIN — ACETAMINOPHEN 650 MG: 325 TABLET ORAL at 04:09

## 2021-01-01 RX ADMIN — IPRATROPIUM BROMIDE AND ALBUTEROL SULFATE 3 ML: .5; 2.5 SOLUTION RESPIRATORY (INHALATION) at 07:32

## 2021-01-01 RX ADMIN — APIXABAN 5 MG: 5 TABLET, FILM COATED ORAL at 08:35

## 2021-01-01 RX ADMIN — PANCRELIPASE 2 CAPSULE: 24000; 76000; 120000 CAPSULE, DELAYED RELEASE PELLETS ORAL at 09:10

## 2021-01-01 RX ADMIN — MEROPENEM 500 MG: 500 INJECTION, POWDER, FOR SOLUTION INTRAVENOUS at 13:39

## 2021-01-01 RX ADMIN — METOPROLOL TARTRATE 25 MG: 25 TABLET, FILM COATED ORAL at 02:57

## 2021-01-01 RX ADMIN — SODIUM CHLORIDE 2.5 MG/HR: 900 INJECTION, SOLUTION INTRAVENOUS at 11:28

## 2021-01-01 RX ADMIN — IPRATROPIUM BROMIDE AND ALBUTEROL 1 PUFF: 20; 100 SPRAY, METERED RESPIRATORY (INHALATION) at 05:49

## 2021-01-01 RX ADMIN — INSULIN LISPRO 2 UNITS: 100 INJECTION, SOLUTION INTRAVENOUS; SUBCUTANEOUS at 14:37

## 2021-01-01 RX ADMIN — APIXABAN 5 MG: 5 TABLET, FILM COATED ORAL at 08:56

## 2021-01-01 RX ADMIN — SODIUM CHLORIDE 40 MG: 9 INJECTION INTRAMUSCULAR; INTRAVENOUS; SUBCUTANEOUS at 20:13

## 2021-01-01 RX ADMIN — NYSTATIN 500000 UNITS: 100000 SUSPENSION ORAL at 13:28

## 2021-01-01 RX ADMIN — MEROPENEM 500 MG: 500 INJECTION, POWDER, FOR SOLUTION INTRAVENOUS at 21:28

## 2021-01-01 RX ADMIN — POTASSIUM CHLORIDE 20 MEQ: 750 TABLET, FILM COATED, EXTENDED RELEASE ORAL at 17:54

## 2021-01-01 RX ADMIN — FUROSEMIDE 40 MG: 10 INJECTION, SOLUTION INTRAMUSCULAR; INTRAVENOUS at 08:50

## 2021-01-01 RX ADMIN — BISACODYL 10 MG: 5 TABLET, COATED ORAL at 17:03

## 2021-01-01 RX ADMIN — PANTOPRAZOLE SODIUM 40 MG: 40 TABLET, DELAYED RELEASE ORAL at 06:45

## 2021-01-01 RX ADMIN — NYSTATIN 500000 UNITS: 100000 SUSPENSION ORAL at 09:19

## 2021-01-01 RX ADMIN — ARFORMOTEROL TARTRATE 15 MCG: 15 SOLUTION RESPIRATORY (INHALATION) at 07:17

## 2021-01-01 RX ADMIN — PANTOPRAZOLE SODIUM 40 MG: 40 TABLET, DELAYED RELEASE ORAL at 06:41

## 2021-01-01 RX ADMIN — POTASSIUM CHLORIDE 20 MEQ: 750 TABLET, FILM COATED, EXTENDED RELEASE ORAL at 09:07

## 2021-01-01 RX ADMIN — INSULIN GLARGINE 18 UNITS: 100 INJECTION, SOLUTION SUBCUTANEOUS at 22:22

## 2021-01-01 RX ADMIN — SODIUM CHLORIDE 40 MG: 9 INJECTION INTRAMUSCULAR; INTRAVENOUS; SUBCUTANEOUS at 08:50

## 2021-01-01 RX ADMIN — MIRTAZAPINE 15 MG: 15 TABLET, FILM COATED ORAL at 23:40

## 2021-01-01 RX ADMIN — FUROSEMIDE 40 MG: 10 INJECTION, SOLUTION INTRAMUSCULAR; INTRAVENOUS at 09:04

## 2021-01-01 RX ADMIN — PANCRELIPASE 1 CAPSULE: 24000; 76000; 120000 CAPSULE, DELAYED RELEASE PELLETS ORAL at 13:19

## 2021-01-01 RX ADMIN — NYSTATIN 500000 UNITS: 100000 SUSPENSION ORAL at 14:43

## 2021-01-01 RX ADMIN — AMIODARONE HYDROCHLORIDE 400 MG: 200 TABLET ORAL at 09:50

## 2021-01-01 RX ADMIN — DILTIAZEM HYDROCHLORIDE 5 MG: 5 INJECTION INTRAVENOUS at 03:16

## 2021-01-01 RX ADMIN — PHENYLEPHRINE HYDROCHLORIDE 50 MCG/MIN: 10 INJECTION INTRAVENOUS at 05:59

## 2021-01-01 RX ADMIN — INSULIN LISPRO 2 UNITS: 100 INJECTION, SOLUTION INTRAVENOUS; SUBCUTANEOUS at 21:18

## 2021-01-01 RX ADMIN — DILTIAZEM HYDROCHLORIDE 300 MG: 180 CAPSULE, EXTENDED RELEASE ORAL at 08:33

## 2021-01-01 RX ADMIN — Medication 2 TABLET: at 08:23

## 2021-01-01 RX ADMIN — PREDNISONE 20 MG: 20 TABLET ORAL at 12:01

## 2021-01-01 RX ADMIN — NYSTATIN 500000 UNITS: 100000 SUSPENSION ORAL at 09:50

## 2021-01-01 RX ADMIN — POTASSIUM CHLORIDE 20 MEQ: 750 TABLET, FILM COATED, EXTENDED RELEASE ORAL at 17:01

## 2021-01-01 RX ADMIN — APIXABAN 5 MG: 5 TABLET, FILM COATED ORAL at 17:29

## 2021-01-01 RX ADMIN — DILTIAZEM HYDROCHLORIDE: 5 INJECTION, SOLUTION INTRAVENOUS at 21:00

## 2021-01-01 RX ADMIN — NYSTATIN 500000 UNITS: 100000 SUSPENSION ORAL at 17:59

## 2021-01-01 RX ADMIN — Medication 2000 UNITS: at 08:55

## 2021-01-01 RX ADMIN — Medication 10 ML: at 21:16

## 2021-01-01 RX ADMIN — Medication 10 ML: at 21:48

## 2021-01-01 RX ADMIN — FUROSEMIDE 40 MG: 10 INJECTION, SOLUTION INTRAVENOUS at 09:33

## 2021-01-01 RX ADMIN — PHENYLEPHRINE HYDROCHLORIDE 85 MCG/MIN: 10 INJECTION INTRAVENOUS at 23:26

## 2021-01-01 RX ADMIN — APIXABAN 5 MG: 5 TABLET, FILM COATED ORAL at 08:13

## 2021-01-01 RX ADMIN — ACETAMINOPHEN 650 MG: 325 TABLET ORAL at 02:44

## 2021-01-01 RX ADMIN — PANCRELIPASE 1 CAPSULE: 24000; 76000; 120000 CAPSULE, DELAYED RELEASE PELLETS ORAL at 16:46

## 2021-01-01 RX ADMIN — NYSTATIN 500000 UNITS: 100000 SUSPENSION ORAL at 13:02

## 2021-01-01 RX ADMIN — CYANOCOBALAMIN 1000 MCG: 1000 INJECTION, SOLUTION INTRAMUSCULAR; SUBCUTANEOUS at 18:19

## 2021-01-01 RX ADMIN — ONDANSETRON 4 MG: 2 INJECTION INTRAMUSCULAR; INTRAVENOUS at 09:26

## 2021-01-01 RX ADMIN — MONTELUKAST 10 MG: 10 TABLET, FILM COATED ORAL at 22:03

## 2021-01-01 RX ADMIN — DILTIAZEM HYDROCHLORIDE 60 MG: 30 TABLET, FILM COATED ORAL at 12:31

## 2021-01-01 RX ADMIN — PANCRELIPASE 1 CAPSULE: 24000; 76000; 120000 CAPSULE, DELAYED RELEASE PELLETS ORAL at 12:48

## 2021-01-01 RX ADMIN — DILTIAZEM HYDROCHLORIDE 240 MG: 240 CAPSULE, COATED, EXTENDED RELEASE ORAL at 08:47

## 2021-01-01 RX ADMIN — CYANOCOBALAMIN TAB 500 MCG 1000 MCG: 500 TAB at 09:07

## 2021-01-01 RX ADMIN — LOPERAMIDE HYDROCHLORIDE 2 MG: 2 CAPSULE ORAL at 17:52

## 2021-01-01 RX ADMIN — NYSTATIN 500000 UNITS: 100000 SUSPENSION ORAL at 17:20

## 2021-01-01 RX ADMIN — PANTOPRAZOLE SODIUM 40 MG: 40 TABLET, DELAYED RELEASE ORAL at 06:27

## 2021-01-01 RX ADMIN — IPRATROPIUM BROMIDE AND ALBUTEROL 1 PUFF: 20; 100 SPRAY, METERED RESPIRATORY (INHALATION) at 01:29

## 2021-01-01 RX ADMIN — AMIODARONE HYDROCHLORIDE 200 MG: 200 TABLET ORAL at 08:50

## 2021-01-01 RX ADMIN — DEXTROSE MONOHYDRATE 1 MG/MIN: 50 INJECTION, SOLUTION INTRAVENOUS at 09:49

## 2021-01-01 RX ADMIN — FUROSEMIDE 40 MG: 40 TABLET ORAL at 08:43

## 2021-01-01 RX ADMIN — POTASSIUM CHLORIDE 20 MEQ: 750 TABLET, FILM COATED, EXTENDED RELEASE ORAL at 08:57

## 2021-01-01 RX ADMIN — ARFORMOTEROL TARTRATE 15 MCG: 15 SOLUTION RESPIRATORY (INHALATION) at 07:23

## 2021-01-01 RX ADMIN — CEFTRIAXONE 1 G: 1 INJECTION, POWDER, FOR SOLUTION INTRAMUSCULAR; INTRAVENOUS at 16:09

## 2021-01-01 RX ADMIN — INSULIN LISPRO 2 UNITS: 100 INJECTION, SOLUTION INTRAVENOUS; SUBCUTANEOUS at 18:31

## 2021-01-01 RX ADMIN — NYSTATIN 500000 UNITS: 100000 SUSPENSION ORAL at 17:18

## 2021-01-01 RX ADMIN — DILTIAZEM HYDROCHLORIDE 60 MG: 30 TABLET, FILM COATED ORAL at 16:09

## 2021-01-01 RX ADMIN — POTASSIUM CHLORIDE 20 MEQ: 750 TABLET, FILM COATED, EXTENDED RELEASE ORAL at 09:03

## 2021-01-01 RX ADMIN — NYSTATIN 500000 UNITS: 100000 SUSPENSION ORAL at 21:31

## 2021-01-01 RX ADMIN — PANCRELIPASE 2 CAPSULE: 24000; 76000; 120000 CAPSULE, DELAYED RELEASE PELLETS ORAL at 17:18

## 2021-01-01 RX ADMIN — IPRATROPIUM BROMIDE 0.5 MG: 0.5 SOLUTION RESPIRATORY (INHALATION) at 11:01

## 2021-01-01 RX ADMIN — AMIODARONE HYDROCHLORIDE 200 MG: 200 TABLET ORAL at 08:57

## 2021-01-01 RX ADMIN — DILTIAZEM HYDROCHLORIDE 300 MG: 180 CAPSULE, EXTENDED RELEASE ORAL at 08:14

## 2021-01-01 RX ADMIN — POTASSIUM CHLORIDE 40 MEQ: 750 TABLET, FILM COATED, EXTENDED RELEASE ORAL at 09:12

## 2021-01-01 RX ADMIN — MONTELUKAST 10 MG: 10 TABLET, FILM COATED ORAL at 21:15

## 2021-01-01 RX ADMIN — NYSTATIN 500000 UNITS: 100000 SUSPENSION ORAL at 17:02

## 2021-01-01 RX ADMIN — PREDNISONE 20 MG: 20 TABLET ORAL at 10:05

## 2021-01-01 RX ADMIN — IPRATROPIUM BROMIDE AND ALBUTEROL 1 PUFF: 20; 100 SPRAY, METERED RESPIRATORY (INHALATION) at 08:12

## 2021-01-01 RX ADMIN — PANCRELIPASE 2 CAPSULE: 24000; 76000; 120000 CAPSULE, DELAYED RELEASE PELLETS ORAL at 16:36

## 2021-01-01 RX ADMIN — NYSTATIN 500000 UNITS: 100000 SUSPENSION ORAL at 22:44

## 2021-01-01 RX ADMIN — Medication 10 ML: at 06:24

## 2021-01-01 RX ADMIN — IRON SUCROSE 200 MG: 20 INJECTION, SOLUTION INTRAVENOUS at 18:48

## 2021-01-01 RX ADMIN — APIXABAN 5 MG: 5 TABLET, FILM COATED ORAL at 17:14

## 2021-01-01 RX ADMIN — IPRATROPIUM BROMIDE AND ALBUTEROL SULFATE 3 ML: .5; 2.5 SOLUTION RESPIRATORY (INHALATION) at 08:46

## 2021-01-01 RX ADMIN — INSULIN LISPRO 3 UNITS: 100 INJECTION, SOLUTION INTRAVENOUS; SUBCUTANEOUS at 22:44

## 2021-01-01 RX ADMIN — CEFTRIAXONE 1 G: 1 INJECTION, POWDER, FOR SOLUTION INTRAMUSCULAR; INTRAVENOUS at 14:51

## 2021-01-01 RX ADMIN — PANTOPRAZOLE SODIUM 40 MG: 40 TABLET, DELAYED RELEASE ORAL at 09:10

## 2021-01-01 RX ADMIN — LOPERAMIDE HYDROCHLORIDE 2 MG: 2 CAPSULE ORAL at 17:22

## 2021-01-01 RX ADMIN — FERROUS SULFATE TAB 325 MG (65 MG ELEMENTAL FE) 325 MG: 325 (65 FE) TAB at 08:14

## 2021-01-01 RX ADMIN — IPRATROPIUM BROMIDE AND ALBUTEROL SULFATE 3 ML: .5; 2.5 SOLUTION RESPIRATORY (INHALATION) at 07:15

## 2021-01-01 RX ADMIN — PANCRELIPASE 1 CAPSULE: 24000; 76000; 120000 CAPSULE, DELAYED RELEASE PELLETS ORAL at 13:02

## 2021-01-01 RX ADMIN — AMIODARONE HYDROCHLORIDE 400 MG: 200 TABLET ORAL at 17:22

## 2021-01-01 RX ADMIN — DIBASIC SODIUM PHOSPHATE, MONOBASIC POTASSIUM PHOSPHATE AND MONOBASIC SODIUM PHOSPHATE 1 TABLET: 852; 155; 130 TABLET ORAL at 17:52

## 2021-01-01 RX ADMIN — IPRATROPIUM BROMIDE AND ALBUTEROL SULFATE 3 ML: .5; 2.5 SOLUTION RESPIRATORY (INHALATION) at 14:11

## 2021-01-01 RX ADMIN — PANCRELIPASE 1 CAPSULE: 24000; 76000; 120000 CAPSULE, DELAYED RELEASE PELLETS ORAL at 08:56

## 2021-01-01 RX ADMIN — IPRATROPIUM BROMIDE AND ALBUTEROL SULFATE 3 ML: .5; 2.5 SOLUTION RESPIRATORY (INHALATION) at 13:26

## 2021-01-01 RX ADMIN — Medication 10 ML: at 13:14

## 2021-01-01 RX ADMIN — INSULIN LISPRO 2 UNITS: 100 INJECTION, SOLUTION INTRAVENOUS; SUBCUTANEOUS at 17:03

## 2021-01-01 RX ADMIN — MONTELUKAST 10 MG: 10 TABLET, FILM COATED ORAL at 21:53

## 2021-01-01 RX ADMIN — INSULIN LISPRO 2 UNITS: 100 INJECTION, SOLUTION INTRAVENOUS; SUBCUTANEOUS at 22:13

## 2021-01-01 RX ADMIN — LOPERAMIDE HYDROCHLORIDE 2 MG: 2 CAPSULE ORAL at 09:05

## 2021-01-01 RX ADMIN — APIXABAN 5 MG: 5 TABLET, FILM COATED ORAL at 08:14

## 2021-01-01 RX ADMIN — NYSTATIN 500000 UNITS: 100000 SUSPENSION ORAL at 22:22

## 2021-01-01 RX ADMIN — Medication 10 ML: at 05:52

## 2021-01-01 RX ADMIN — APIXABAN 5 MG: 5 TABLET, FILM COATED ORAL at 21:18

## 2021-01-01 RX ADMIN — INSULIN LISPRO 2 UNITS: 100 INJECTION, SOLUTION INTRAVENOUS; SUBCUTANEOUS at 12:00

## 2021-01-01 RX ADMIN — MONTELUKAST 10 MG: 10 TABLET, FILM COATED ORAL at 17:14

## 2021-01-01 RX ADMIN — POTASSIUM CHLORIDE 40 MEQ: 750 TABLET, FILM COATED, EXTENDED RELEASE ORAL at 10:17

## 2021-01-01 RX ADMIN — NYSTATIN 500000 UNITS: 100000 SUSPENSION ORAL at 08:46

## 2021-01-01 RX ADMIN — IPRATROPIUM BROMIDE 0.5 MG: 0.5 SOLUTION RESPIRATORY (INHALATION) at 11:47

## 2021-01-01 RX ADMIN — DIBASIC SODIUM PHOSPHATE, MONOBASIC POTASSIUM PHOSPHATE AND MONOBASIC SODIUM PHOSPHATE 1 TABLET: 852; 155; 130 TABLET ORAL at 17:22

## 2021-01-01 RX ADMIN — FERROUS SULFATE TAB 325 MG (65 MG ELEMENTAL FE) 325 MG: 325 (65 FE) TAB at 08:56

## 2021-01-01 RX ADMIN — IRON SUCROSE 200 MG: 20 INJECTION, SOLUTION INTRAVENOUS at 09:49

## 2021-01-01 RX ADMIN — DIGOXIN 0.12 MG: 250 TABLET ORAL at 08:56

## 2021-01-01 RX ADMIN — PANCRELIPASE 2 CAPSULE: 24000; 76000; 120000 CAPSULE, DELAYED RELEASE PELLETS ORAL at 11:59

## 2021-01-01 RX ADMIN — PANCRELIPASE 2 CAPSULE: 24000; 76000; 120000 CAPSULE, DELAYED RELEASE PELLETS ORAL at 08:22

## 2021-01-01 RX ADMIN — FUROSEMIDE 40 MG: 40 TABLET ORAL at 08:41

## 2021-01-01 RX ADMIN — FERROUS SULFATE TAB 325 MG (65 MG ELEMENTAL FE) 325 MG: 325 (65 FE) TAB at 08:13

## 2021-01-01 RX ADMIN — ARFORMOTEROL TARTRATE 15 MCG: 15 SOLUTION RESPIRATORY (INHALATION) at 07:08

## 2021-01-01 RX ADMIN — NYSTATIN 500000 UNITS: 100000 SUSPENSION ORAL at 17:54

## 2021-01-01 RX ADMIN — CYANOCOBALAMIN TAB 500 MCG 1000 MCG: 500 TAB at 08:36

## 2021-01-01 RX ADMIN — NYSTATIN 500000 UNITS: 100000 SUSPENSION ORAL at 21:29

## 2021-01-01 RX ADMIN — PHENYLEPHRINE HYDROCHLORIDE 100 MCG/MIN: 10 INJECTION INTRAVENOUS at 03:08

## 2021-01-01 RX ADMIN — PANCRELIPASE 2 CAPSULE: 24000; 76000; 120000 CAPSULE, DELAYED RELEASE PELLETS ORAL at 11:06

## 2021-01-01 RX ADMIN — ARFORMOTEROL TARTRATE 15 MCG: 15 SOLUTION RESPIRATORY (INHALATION) at 20:35

## 2021-01-01 RX ADMIN — Medication 10 ML: at 22:42

## 2021-01-01 RX ADMIN — PANCRELIPASE 2 CAPSULE: 24000; 76000; 120000 CAPSULE, DELAYED RELEASE PELLETS ORAL at 16:27

## 2021-01-01 RX ADMIN — METOPROLOL TARTRATE 25 MG: 25 TABLET, FILM COATED ORAL at 08:11

## 2021-01-01 RX ADMIN — IPRATROPIUM BROMIDE 0.5 MG: 0.5 SOLUTION RESPIRATORY (INHALATION) at 15:30

## 2021-01-01 RX ADMIN — ONDANSETRON 4 MG: 2 INJECTION INTRAMUSCULAR; INTRAVENOUS at 16:12

## 2021-01-01 RX ADMIN — POTASSIUM CHLORIDE 20 MEQ: 750 TABLET, FILM COATED, EXTENDED RELEASE ORAL at 08:33

## 2021-01-01 RX ADMIN — Medication 10 ML: at 23:42

## 2021-01-01 RX ADMIN — ARFORMOTEROL TARTRATE 15 MCG: 15 SOLUTION RESPIRATORY (INHALATION) at 08:13

## 2021-01-01 RX ADMIN — IPRATROPIUM BROMIDE AND ALBUTEROL SULFATE 3 ML: .5; 3 SOLUTION RESPIRATORY (INHALATION) at 14:22

## 2021-01-01 RX ADMIN — FUROSEMIDE 40 MG: 10 INJECTION, SOLUTION INTRAVENOUS at 09:19

## 2021-01-01 RX ADMIN — DILTIAZEM HYDROCHLORIDE 60 MG: 30 TABLET, FILM COATED ORAL at 06:13

## 2021-01-01 RX ADMIN — MAGNESIUM CITRATE 296 ML: 1.75 LIQUID ORAL at 21:30

## 2021-01-01 RX ADMIN — IPRATROPIUM BROMIDE AND ALBUTEROL 1 PUFF: 20; 100 SPRAY, METERED RESPIRATORY (INHALATION) at 11:23

## 2021-01-01 RX ADMIN — FERROUS SULFATE TAB 325 MG (65 MG ELEMENTAL FE) 325 MG: 325 (65 FE) TAB at 08:43

## 2021-01-01 RX ADMIN — MEROPENEM 500 MG: 500 INJECTION, POWDER, FOR SOLUTION INTRAVENOUS at 22:12

## 2021-01-01 RX ADMIN — SODIUM CHLORIDE 40 MG: 9 INJECTION INTRAMUSCULAR; INTRAVENOUS; SUBCUTANEOUS at 10:20

## 2021-01-01 RX ADMIN — IPRATROPIUM BROMIDE AND ALBUTEROL SULFATE 3 ML: .5; 2.5 SOLUTION RESPIRATORY (INHALATION) at 00:56

## 2021-01-01 RX ADMIN — AZITHROMYCIN MONOHYDRATE 500 MG: 250 TABLET ORAL at 17:26

## 2021-01-01 RX ADMIN — PANCRELIPASE 2 CAPSULE: 24000; 76000; 120000 CAPSULE, DELAYED RELEASE PELLETS ORAL at 11:48

## 2021-01-01 RX ADMIN — Medication 10 ML: at 05:45

## 2021-01-01 RX ADMIN — NYSTATIN 500000 UNITS: 100000 SUSPENSION ORAL at 21:15

## 2021-01-01 RX ADMIN — Medication 2000 UNITS: at 08:36

## 2021-01-01 RX ADMIN — MIRTAZAPINE 15 MG: 15 TABLET, FILM COATED ORAL at 21:18

## 2021-01-01 RX ADMIN — BISACODYL 10 MG: 5 TABLET, COATED ORAL at 20:47

## 2021-01-01 RX ADMIN — IPRATROPIUM BROMIDE 0.5 MG: 0.5 SOLUTION RESPIRATORY (INHALATION) at 15:39

## 2021-01-01 RX ADMIN — METHYLPREDNISOLONE SODIUM SUCCINATE 40 MG: 40 INJECTION, POWDER, FOR SOLUTION INTRAMUSCULAR; INTRAVENOUS at 02:18

## 2021-01-01 RX ADMIN — AZITHROMYCIN MONOHYDRATE 500 MG: 250 TABLET ORAL at 18:39

## 2021-01-01 RX ADMIN — METHYLPREDNISOLONE SODIUM SUCCINATE 40 MG: 40 INJECTION, POWDER, FOR SOLUTION INTRAMUSCULAR; INTRAVENOUS at 18:39

## 2021-01-01 RX ADMIN — Medication 10 ML: at 05:58

## 2021-01-01 RX ADMIN — SODIUM CHLORIDE 40 MG: 9 INJECTION INTRAMUSCULAR; INTRAVENOUS; SUBCUTANEOUS at 20:18

## 2021-01-01 RX ADMIN — PHENYLEPHRINE HYDROCHLORIDE 11 MCG/MIN: 10 INJECTION INTRAVENOUS at 10:08

## 2021-01-01 RX ADMIN — CYANOCOBALAMIN TAB 500 MCG 1000 MCG: 500 TAB at 08:07

## 2021-01-01 RX ADMIN — Medication 10 ML: at 03:12

## 2021-01-01 RX ADMIN — PANTOPRAZOLE SODIUM 40 MG: 40 TABLET, DELAYED RELEASE ORAL at 06:26

## 2021-01-01 RX ADMIN — Medication 2000 UNITS: at 08:42

## 2021-01-01 RX ADMIN — Medication 10 ML: at 06:45

## 2021-01-01 RX ADMIN — SODIUM CHLORIDE 40 MG: 9 INJECTION INTRAMUSCULAR; INTRAVENOUS; SUBCUTANEOUS at 08:34

## 2021-01-01 RX ADMIN — Medication 10 ML: at 05:53

## 2021-01-01 RX ADMIN — IPRATROPIUM BROMIDE AND ALBUTEROL SULFATE 3 ML: .5; 2.5 SOLUTION RESPIRATORY (INHALATION) at 13:54

## 2021-01-01 RX ADMIN — MONTELUKAST 10 MG: 10 TABLET, FILM COATED ORAL at 23:41

## 2021-01-01 RX ADMIN — INSULIN LISPRO 5 UNITS: 100 INJECTION, SOLUTION INTRAVENOUS; SUBCUTANEOUS at 17:44

## 2021-01-01 RX ADMIN — AZITHROMYCIN MONOHYDRATE 500 MG: 500 INJECTION, POWDER, LYOPHILIZED, FOR SOLUTION INTRAVENOUS at 16:12

## 2021-01-01 RX ADMIN — IPRATROPIUM BROMIDE AND ALBUTEROL SULFATE 3 ML: .5; 2.5 SOLUTION RESPIRATORY (INHALATION) at 16:56

## 2021-01-01 RX ADMIN — BUDESONIDE 500 MCG: 0.5 INHALANT RESPIRATORY (INHALATION) at 19:31

## 2021-01-01 RX ADMIN — ACETAMINOPHEN 650 MG: 325 TABLET ORAL at 10:08

## 2021-01-01 RX ADMIN — INSULIN LISPRO 2 UNITS: 100 INJECTION, SOLUTION INTRAVENOUS; SUBCUTANEOUS at 21:42

## 2021-01-01 RX ADMIN — ARFORMOTEROL TARTRATE 15 MCG: 15 SOLUTION RESPIRATORY (INHALATION) at 07:43

## 2021-01-01 RX ADMIN — IPRATROPIUM BROMIDE AND ALBUTEROL SULFATE 3 ML: .5; 2.5 SOLUTION RESPIRATORY (INHALATION) at 20:47

## 2021-01-01 RX ADMIN — DIBASIC SODIUM PHOSPHATE, MONOBASIC POTASSIUM PHOSPHATE AND MONOBASIC SODIUM PHOSPHATE 1 TABLET: 852; 155; 130 TABLET ORAL at 17:14

## 2021-01-01 RX ADMIN — IPRATROPIUM BROMIDE AND ALBUTEROL 1 PUFF: 20; 100 SPRAY, METERED RESPIRATORY (INHALATION) at 04:53

## 2021-01-01 RX ADMIN — DULOXETINE HYDROCHLORIDE 60 MG: 30 CAPSULE, DELAYED RELEASE ORAL at 08:57

## 2021-01-01 RX ADMIN — IPRATROPIUM BROMIDE AND ALBUTEROL SULFATE 3 ML: .5; 2.5 SOLUTION RESPIRATORY (INHALATION) at 12:11

## 2021-01-01 RX ADMIN — PROPOFOL 100 MCG/KG/MIN: 10 INJECTION, EMULSION INTRAVENOUS at 08:57

## 2021-01-01 RX ADMIN — BUDESONIDE 500 MCG: 0.5 INHALANT RESPIRATORY (INHALATION) at 08:02

## 2021-01-01 RX ADMIN — IPRATROPIUM BROMIDE AND ALBUTEROL 1 PUFF: 20; 100 SPRAY, METERED RESPIRATORY (INHALATION) at 21:28

## 2021-01-01 RX ADMIN — INSULIN LISPRO 6 UNITS: 100 INJECTION, SOLUTION INTRAVENOUS; SUBCUTANEOUS at 16:54

## 2021-01-01 RX ADMIN — NYSTATIN 500000 UNITS: 100000 SUSPENSION ORAL at 11:55

## 2021-01-01 RX ADMIN — Medication 10 ML: at 07:21

## 2021-01-01 RX ADMIN — AMIODARONE HYDROCHLORIDE 400 MG: 200 TABLET ORAL at 17:40

## 2021-01-01 RX ADMIN — DILTIAZEM HYDROCHLORIDE 240 MG: 120 CAPSULE, COATED, EXTENDED RELEASE ORAL at 09:07

## 2021-01-01 RX ADMIN — CYANOCOBALAMIN TAB 500 MCG 1000 MCG: 500 TAB at 08:57

## 2021-01-01 RX ADMIN — APIXABAN 5 MG: 5 TABLET, FILM COATED ORAL at 22:04

## 2021-01-01 RX ADMIN — ARFORMOTEROL TARTRATE 15 MCG: 15 SOLUTION RESPIRATORY (INHALATION) at 07:22

## 2021-01-01 RX ADMIN — MONTELUKAST 10 MG: 10 TABLET, FILM COATED ORAL at 18:02

## 2021-01-01 RX ADMIN — PANCRELIPASE 1 CAPSULE: 24000; 76000; 120000 CAPSULE, DELAYED RELEASE PELLETS ORAL at 14:37

## 2021-01-01 RX ADMIN — IPRATROPIUM BROMIDE AND ALBUTEROL SULFATE 3 ML: .5; 2.5 SOLUTION RESPIRATORY (INHALATION) at 20:30

## 2021-01-01 RX ADMIN — IPRATROPIUM BROMIDE AND ALBUTEROL SULFATE 3 ML: .5; 2.5 SOLUTION RESPIRATORY (INHALATION) at 11:43

## 2021-01-01 RX ADMIN — IPRATROPIUM BROMIDE 0.5 MG: 0.5 SOLUTION RESPIRATORY (INHALATION) at 07:02

## 2021-01-01 RX ADMIN — PANCRELIPASE 1 CAPSULE: 24000; 76000; 120000 CAPSULE, DELAYED RELEASE PELLETS ORAL at 17:38

## 2021-01-01 RX ADMIN — POTASSIUM CHLORIDE 20 MEQ: 750 TABLET, FILM COATED, EXTENDED RELEASE ORAL at 09:33

## 2021-01-01 RX ADMIN — IPRATROPIUM BROMIDE AND ALBUTEROL 1 PUFF: 20; 100 SPRAY, METERED RESPIRATORY (INHALATION) at 16:38

## 2021-01-01 RX ADMIN — INSULIN LISPRO 2 UNITS: 100 INJECTION, SOLUTION INTRAVENOUS; SUBCUTANEOUS at 22:22

## 2021-01-01 RX ADMIN — PANCRELIPASE 1 CAPSULE: 24000; 76000; 120000 CAPSULE, DELAYED RELEASE PELLETS ORAL at 12:46

## 2021-01-01 RX ADMIN — CYANOCOBALAMIN 1000 MCG: 1000 INJECTION, SOLUTION INTRAMUSCULAR; SUBCUTANEOUS at 08:23

## 2021-01-01 RX ADMIN — DILTIAZEM HYDROCHLORIDE 300 MG: 180 CAPSULE, EXTENDED RELEASE ORAL at 08:56

## 2021-01-01 RX ADMIN — APIXABAN 5 MG: 5 TABLET, FILM COATED ORAL at 22:44

## 2021-01-01 RX ADMIN — NYSTATIN 500000 UNITS: 100000 SUSPENSION ORAL at 12:26

## 2021-01-01 RX ADMIN — PANCRELIPASE 1 CAPSULE: 24000; 76000; 120000 CAPSULE, DELAYED RELEASE PELLETS ORAL at 09:22

## 2021-01-01 RX ADMIN — PANCRELIPASE 1 CAPSULE: 24000; 76000; 120000 CAPSULE, DELAYED RELEASE PELLETS ORAL at 11:30

## 2021-01-01 RX ADMIN — FERROUS SULFATE TAB 325 MG (65 MG ELEMENTAL FE) 325 MG: 325 (65 FE) TAB at 09:31

## 2021-01-01 RX ADMIN — ARFORMOTEROL TARTRATE 15 MCG: 15 SOLUTION RESPIRATORY (INHALATION) at 18:24

## 2021-01-01 RX ADMIN — IPRATROPIUM BROMIDE AND ALBUTEROL SULFATE 3 ML: .5; 3 SOLUTION RESPIRATORY (INHALATION) at 08:28

## 2021-01-01 RX ADMIN — MIRTAZAPINE 15 MG: 15 TABLET, FILM COATED ORAL at 22:40

## 2021-01-01 RX ADMIN — MONTELUKAST SODIUM 10 MG: 10 TABLET, FILM COATED ORAL at 21:28

## 2021-01-01 RX ADMIN — Medication 10 ML: at 22:06

## 2021-01-01 RX ADMIN — PANCRELIPASE 1 CAPSULE: 24000; 76000; 120000 CAPSULE, DELAYED RELEASE PELLETS ORAL at 09:05

## 2021-01-01 RX ADMIN — AMIODARONE HYDROCHLORIDE 200 MG: 200 TABLET ORAL at 11:13

## 2021-01-01 RX ADMIN — AMIODARONE HYDROCHLORIDE 400 MG: 200 TABLET ORAL at 08:08

## 2021-01-01 NOTE — ED TRIAGE NOTES
Pt arrives via EMS from home with cc of respiratory distress. EMS states that upon arrival pt was in an awkward position on her bed, in a slight Trendelenburg, and had SPO2 in 70-80's. EMS sat her upright, put her on 15L NRB, and pt's SPO2 improved to 90's. Pt wears 3L NC at baseline; PMH of CHF, COPD, Afib, cardiac stents. Pt reports that she is feeling better.

## 2021-01-01 NOTE — ED PROVIDER NOTES
29-year-old female with a history of CAD, COPD, obesity, diabetes, CHF presents with shortness of breath and respiratory distress that started this morning. She found herself in an awkward position in her bed with her head down in the supine position almost in a Trendelenburg type position. She became very short of breath and called EMS. Reportedly her oxygen saturations were in the 70s but they could not  a good pleth. She does have bad peripheral arterial disease. She was put on a nonrebreather in route and had 92% oxygen saturation. She states that she feels pretty much back to normal at this time. She was in her normal state of health before she went to bed. She has chronic leg swelling which is not any worse than normal. 
 
 
Shortness of Breath Past Medical History:  
Diagnosis Date  Asthma  Atrial fibrillation ([address] 11/16/2018  Autoimmune disease ([address]   
 fibromyalgia  CAD (coronary artery disease) 10/9/2015  Closed wedge compression fracture of T7 vertebra ([address] 11/13/2018  Diabetes ([address]  DVT (deep vein thrombosis) in pregnancy  GERD (gastroesophageal reflux disease)  Heart failure ([address]  History of vascular access device 09/30/2020  
 4f midline, 12cm at 1cm out placed in L Cephalic by Lennox Hughes, RN  
 HTN (hypertension)  NSTEMI (non-ST elevated myocardial infarction) ([address] 10/9/2015  Obesity (BMI 30-39.9) 11/13/2018  Sleep apnea   
 wears CPAP Past Surgical History:  
Procedure Laterality Date  ABDOMEN SURGERY PROC UNLISTED    
 hital hernia repair, gall bladder removed  AMPUTATION TRANSMETACARPAL Right 06/12/2019  
 entire ring finger, 2nd & 3rd fingers (transmetacarpal)  BREAST SURGERY PROCEDURE UNLISTED    
 lumpectomy  CARDIAC SURG PROCEDURE UNLIST  10/9/15  
 2 stents [place]  HX COLOSTOMY    
 and reversal, post episiotomy repair [address]  HX UROLOGICAL  2009  
 bladder sling  IR KYPHOPLASTY LUMBAR  10/8/2020 Family History:  
Problem Relation Age of Onset  Diabetes Mother  Heart Failure Mother  Kidney Disease Mother  Diabetes Father  Heart Disease Father  Elevated Lipids Father  Heart Failure Father  Heart Disease Brother  Cancer Brother   
     kidney  Diabetes Brother  No Known Problems Brother  No Known Problems Brother Social History Socioeconomic History  Marital status:  Spouse name: Not on file  Number of children: Not on file  Years of education: Not on file  Highest education level: Not on file Occupational History  Not on file Social Needs  Financial resource strain: Not on file  Food insecurity Worry: Not on file Inability: Not on file  Transportation needs Medical: Not on file Non-medical: Not on file Tobacco Use  Smoking status: Former Smoker Quit date: 3/30/2017 Years since quitting: 3.7  Smokeless tobacco: Never Used Substance and Sexual Activity  Alcohol use: No  
  Alcohol/week: 0.0 standard drinks Comment: very very rarely  Drug use: No  
 Sexual activity: Never Lifestyle  Physical activity Days per week: Not on file Minutes per session: Not on file  Stress: Not on file Relationships  Social connections Talks on phone: Not on file Gets together: Not on file Attends Scientologist service: Not on file Active member of club or organization: Not on file Attends meetings of clubs or organizations: Not on file Relationship status: Not on file  Intimate partner violence Fear of current or ex partner: Not on file Emotionally abused: Not on file Physically abused: Not on file Forced sexual activity: Not on file Other Topics Concern [address] Service Not Asked  Blood Transfusions Not Asked  Caffeine Concern Not Asked  Occupational Exposure Not Asked [place] Hazards Not Asked  Sleep Concern Not Asked  Stress Concern Not Asked  Weight Concern Not Asked  Special Diet Not Asked  Back Care Not Asked  Exercise Not Asked  Bike Helmet Not Asked  Seat Belt Not Asked  Self-Exams Not Asked Social History Narrative  Not on file ALLERGIES: Advair diskus [fluticasone propion-salmeterol], Aspartame, Breo ellipta [fluticasone furoate-vilanterol], Bumex [bumetanide], Ciprofloxacin, Statins-hmg-coa reductase inhibitors, Sulfa (sulfonamide antibiotics), Tetracycline, Torsemide, and Zetia [ezetimibe] Review of Systems Respiratory: Positive for shortness of breath. All other systems reviewed and are negative. Vitals:  
 01/01/21 1504 BP: (!) 114/97 Pulse: (!) 104 Resp: 20 Temp: 97.7 °F (36.5 °C) SpO2: 100% Weight: 115.7 kg (255 lb) Height: 5' 7\" (1.702 m) Physical Exam 
Vitals signs and nursing note reviewed. Constitutional:   
   General: She is not in acute distress. Appearance: She is obese. She is ill-appearing (Chronically). HENT:  
   Head: Normocephalic and atraumatic. Mouth/Throat:  
   Mouth: Mucous membranes are moist.  
Eyes:  
   General: No scleral icterus. Conjunctiva/sclera: Conjunctivae normal.  
   Pupils: Pupils are equal, round, and reactive to light. Neck: Musculoskeletal: Neck supple. Trachea: No tracheal deviation. Cardiovascular:  
   Rate and Rhythm: Normal rate and regular rhythm. Pulmonary:  
   Effort: Pulmonary effort is normal. No respiratory distress. Breath sounds: No decreased breath sounds, wheezing, rhonchi or rales. Abdominal:  
   General: There is no distension. Palpations: Abdomen is soft. Tenderness: There is no abdominal tenderness. Genitourinary: 
   Comments: deferred Musculoskeletal:     
   General: No deformity. Right lower leg: Edema present. Left lower leg: Edema present.   
Skin: 
   General: Skin is warm and dry. Neurological:  
   General: No focal deficit present. Mental Status: She is alert. Psychiatric:     
   Mood and Affect: Mood normal.  
 
  
 
[facility] 
ED Course as of Jan 01 1817 Fri Jan 01, 2021 1816 Due to poor access I placed in EJ IV catheter. Normal sterile technique was used. Successful blood return was obtained. [TT] ED Course User Index [TT] Jhoana Palacios MD  
 
 
Procedures 15:01 EKG shows sinus tachycardia rate of 105, left axis deviation, normal intervals, no acute ischemia or infarction. Perfect Serve Consult for Admission 4:31 PM 
 
ED Room Number: FG83/88 Patient Name and age:  Dyke Moritz 70 y.o.  female Working Diagnosis: 1. Anemia, unspecified type 2. Community acquired pneumonia of left lower lobe of lung COVID-19 Suspicion:  yes Sepsis present:  yes  Reassessment needed: no 
Code Status:  Full Code Readmission: no 
Isolation Requirements:  yes Recommended Level of Care:  telemetry Department:[facility] - [phone] Other:  hgb 4.7, given zosyn IMPRESSION: 
1. Anemia, unspecified type 2. Community acquired pneumonia of left lower lobe of lung - Sepsis order set entered: NO 
- Broad Spectrum Antibiotics given: Zosyn - Repeat lactic acid ordered for time NA 
 
- Re-assessment performed at time 4:33 PM and clinical condition stable. - Actions taken: prbc's and abx ordered. - Hypotension or Lactic Acidosis present (SBP<90, MAP<65, Lactate >4): NO IVF:  Not indicated due to Hypotension not present - Septic Shock present (Hypotension despite IVF resuscitation): NO  Vasopressors: Not indicated due to Septic Shock not present Plan: 
Admit to Telemetry Total critical care time spent exclusive of procedures:  37 minutes Agatha Billings.  Carl Souza MD

## 2021-01-01 NOTE — H&P
[address] Adult  Hospitalist Group History & Physical 
 
Date of service: 1/1/2021 Patient name: Dl Dodd MRN: [id number] YOB: 1945 Age: 70 y.o. Primary care provider:  Kavita Davis MD  
 
Source of Information: patient, medical records Chief complain: Shortness of breath History of present illness Dl Dodd is a 70 y.o. female who presented with shortness of breath which has been progressively worsening since this weekend. She reports having by cough and sinus congestion. Symptoms acutely worsened this morning. She denies any chest pain, abdominal pain, diarrhea, or fever. She has felt chills. Upon arrival of EMS she was found to have saturations in the 70s. She has a history of chronic COPD and uses 3 L of oxygen at baseline. Past Medical History:  
Diagnosis Date  Asthma  Atrial fibrillation ([address] 11/16/2018  Autoimmune disease ([address]   
 fibromyalgia  CAD (coronary artery disease) 10/9/2015  Closed wedge compression fracture of T7 vertebra ([address] 11/13/2018  Diabetes ([address]  DVT (deep vein thrombosis) in pregnancy  GERD (gastroesophageal reflux disease)  Heart failure ([address]  History of vascular access device 09/30/2020  
 4f midline, 12cm at 1cm out placed in L Cephalic by Luisa Lazar RN  
 HTN (hypertension)  NSTEMI (non-ST elevated myocardial infarction) ([address] 10/9/2015  Obesity (BMI 30-39.9) 11/13/2018  Sleep apnea   
 wears CPAP Past Surgical History:  
Procedure Laterality Date  ABDOMEN SURGERY PROC UNLISTED    
 hital hernia repair, gall bladder removed  AMPUTATION TRANSMETACARPAL Right 06/12/2019  
 entire ring finger, 2nd & 3rd fingers (transmetacarpal)  BREAST SURGERY PROCEDURE UNLISTED    
 lumpectomy  CARDIAC SURG PROCEDURE UNLIST  10/9/15  
 2 stents [place]  HX COLOSTOMY    
 and reversal, post episiotomy repair [address]  HX UROLOGICAL  2009  
 bladder sling  IR KYPHOPLASTY LUMBAR  10/8/2020 Prior to Admission medications Medication Sig Start Date End Date Taking? Authorizing Provider  
apixaban (ELIQUIS) 5 mg tablet Take 5 mg by mouth two (2) times a day. Provider, Historical  
alendronate (FOSAMAX) 70 mg tablet Take 70 mg by mouth every thirty (30) days. On the 1st day of each month    Provider, Historical  
zinc oxide 20 % ointment Apply  to affected area three (3) times daily. Bilateral gluteal and perianal area each shift    Provider, Historical  
loperamide (IMODIUM) 2 mg capsule Take 4 mg by mouth two (2) times a day. Provider, Historical  
honey (MediHoney, honey,) 100 % topical paste Apply  to affected area daily. Provider, Historical  
cyanocobalamin 1,000 mcg tablet Take 1 Tab by mouth daily. 10/13/20   Enmanuel Montano MD  
digoxin (LANOXIN) 0.125 mg tablet Take 1 Tab by mouth daily. 10/13/20   Enmanuel Montano MD  
ferrous sulfate 325 mg (65 mg iron) tablet Take 1 Tab by mouth daily (with breakfast). 10/13/20   Enmanuel Montano MD  
tiotropium bromide (Spiriva Respimat) 2.5 mcg/actuation inhaler Take 2 Puffs by inhalation daily. Provider, Historical  
montelukast (Singulair) 10 mg tablet Take 10 mg by mouth every evening. Provider, Historical  
sAXagliptin (ONGLYZA) 5 mg tab tablet Take 5 mg by mouth Daily (before dinner). Provider, Historical  
metoprolol tartrate (LOPRESSOR) 25 mg tablet Take 25 mg by mouth daily as needed (For systolic >441). Provider, Historical  
acetaminophen (TYLENOL) 325 mg tablet Take 1 Tab by mouth every four (4) hours as needed for Pain. 3/31/20   Enmanuel Montano MD  
magnesium oxide (MAG-OX) 400 mg tablet Take 400 mg by mouth nightly. Provider, Historical  
potassium chloride SR (KLOR-CON 10) 10 mEq tablet Take 10 mEq by mouth two (2) times a day.     Provider, Historical  
predniSONE (DELTASONE) 10 mg tablet Take 10 mg by mouth two (2) times daily (with meals). Provider, Historical  
Omeprazole delayed release (PRILOSEC D/R) 20 mg tablet Take 20 mg by mouth two (2) times a day. Provider, Historical  
mirtazapine (REMERON) 15 mg tablet Take 15 mg by mouth nightly. Provider, Historical  
albuterol (PROVENTIL VENTOLIN) 2.5 mg /3 mL (0.083 %) nebulizer solution 2.5 mg by Nebulization route every six (6) hours as needed for Wheezing or Shortness of Breath. Provider, Historical  
albuterol (PROAIR HFA) 90 mcg/actuation inhaler Take 2 Puffs by inhalation every four (4) hours as needed. Provider, Historical  
lactobacillus rhamnosus gg 10 billion cell (CULTURELLE) 10 billion cell capsule Take 1 Cap by mouth daily. Provider, Historical  
biotin 10,000 mcg cap Take 1 Cap by mouth daily. Provider, Historical  
DULoxetine (CYMBALTA) 60 mg capsule Take 60 mg by mouth daily. Provider, Historical  
cholecalciferol, vitamin D3, (Vitamin D3) 50 mcg (2,000 unit) tab Take 2,000 Units by mouth daily. Provider, Historical  
azelastine-fluticasone (DYMISTA) 137-50 mcg/spray spry 2 Sprays by Nasal route daily as needed (allergies). Provider, Historical  
mometasone-formoterol (DULERA) 200-5 mcg/actuation HFA inhaler Take 2 Puffs by inhalation two (2) times daily as needed. Provider, Historical  
 
Allergies Allergen Reactions  Advair Diskus [Fluticasone Propion-Salmeterol] Rash  Aspartame Other (comments)  Breo Ellipta [Fluticasone Furoate-Vilanterol] Rash  Bumex [Bumetanide] Myalgia  Ciprofloxacin Rash  Statins-Hmg-Coa Reductase Inhibitors Other (comments) Muscle pain Lipitor/crestor/zocor  Sulfa (Sulfonamide Antibiotics) Rash  Tetracycline Other (comments) musclepain  Torsemide Rash and Myalgia  Zetia [Ezetimibe] Myalgia Family History Problem Relation Age of Onset  Diabetes Mother  Heart Failure Mother  Kidney Disease Mother  Diabetes Father  Heart Disease Father  Elevated Lipids Father  Heart Failure Father  Heart Disease Brother  Cancer Brother   
     kidney  Diabetes Brother  No Known Problems Brother  No Known Problems Brother Social history Social History Tobacco Use Smoking Status Former Smoker  Quit date: 3/30/2017  Years since quitting: 3.7 Smokeless Tobacco Never Used Social History Substance and Sexual Activity Alcohol Use No  
 Alcohol/week: 0.0 standard drinks Comment: very very rarely Code status Code status discussed with the patient/caregivers. Prior Review of systems A comprehensive review of systems was negative except for that written in the History of Present Illness. Physical Examination Visit Vitals BP (!) 170/64 Pulse 97 Temp 97.7 °F (36.5 °C) Resp 20 Ht 5' 7\" (1.702 m) Wt 115.7 kg (255 lb) SpO2 100% BMI 39.94 kg/m² O2 Flow Rate (L/min): 3 l/min O2 Device: Nasal cannula General:  Alert, cooperative, no distress Head:  Normocephalic, without obvious abnormality, atraumatic Eyes:  Conjunctivae/corneas clear. PERRL, EOMs intact Head/Neck: Nares normal. No nasal drainage or sinus tenderness Lips, mucosa, and tongue normal  
Teeth and gums normal 
Clear oropharynx Trachea midline No palpable adenopathy No thyroid enlargement, tenderness Lungs:   Symmetrical chest expansion and respiratory effort Clear to auscultation bilaterally Heart:  Regular rhythm Sounds normal; no murmur, rub or gallop Abdomen:   Soft, no tenderness Bowel sounds normal 
No masses or hepatosplenomegaly Back: No CVA tenderness Extremities: Extremities normal, atraumatic No cyanosis or edema Skin: No rashes or ulcers Musculo- 
    skeletal: Gait not tested Normal symmetry, ROM, strength and tone Neuro: Cranial nerves grossly normal 
  
Psych: Alert, oriented x3 Normal affect, judgement and insight Data Review 
 
24 Hour Results: 
Recent Results (from the past 24 hour(s))  
CBC WITH AUTOMATED DIFF  
 Collection Time: 01/01/21  3:30 PM  
Result Value Ref Range  
 WBC 15.6 (H) 3.6 - 11.0 K/uL  
 RBC 2.01 (L) 3.80 - 5.20 M/uL  
 HGB 4.9 (LL) 11.5 - 16.0 g/dL  
 HCT 17.7 (LL) 35.0 - 47.0 %  
 MCV 88.1 80.0 - 99.0 FL  
 MCH 24.4 (L) 26.0 - 34.0 PG  
 MCHC 27.7 (L) 30.0 - 36.5 g/dL  
 RDW 18.0 (H) 11.5 - 14.5 %  
 PLATELET 505 (H) 150 - 400 K/uL  
 MPV 10.8 8.9 - 12.9 FL  
 NRBC 2.1 (H) 0  WBC  
 ABSOLUTE NRBC 0.32 (H) 0.00 - 0.01 K/uL  
 NEUTROPHILS 88 (H) 32 - 75 %  
 BAND NEUTROPHILS 2 0 - 6 %  
 LYMPHOCYTES 3 (L) 12 - 49 %  
 MONOCYTES 6 5 - 13 %  
 EOSINOPHILS 0 0 - 7 %  
 BASOPHILS 0 0 - 1 %  
 MYELOCYTES 1 (H) 0 %  
 IMMATURE GRANULOCYTES 0 %  
 ABS. NEUTROPHILS 14.0 (H) 1.8 - 8.0 K/UL  
 ABS. LYMPHOCYTES 0.5 (L) 0.8 - 3.5 K/UL  
 ABS. MONOCYTES 0.9 0.0 - 1.0 K/UL  
 ABS. EOSINOPHILS 0.0 0.0 - 0.4 K/UL  
 ABS. BASOPHILS 0.0 0.0 - 0.1 K/UL  
 ABS. IMM. GRANS. 0.0 K/UL  
 DF MANUAL    
 RBC COMMENTS ANISOCYTOSIS 
2+ 
    
 RBC COMMENTS MICROCYTOSIS 
2+ 
    
 WBC COMMENTS TOXIC GRANULATION    
METABOLIC PANEL, COMPREHENSIVE  
 Collection Time: 01/01/21  3:30 PM  
Result Value Ref Range  
 Sodium 139 136 - 145 mmol/L  
 Potassium 4.6 3.5 - 5.1 mmol/L  
 Chloride 104 97 - 108 mmol/L  
 CO2 33 (H) 21 - 32 mmol/L  
 Anion gap 2 (L) 5 - 15 mmol/L  
 Glucose 226 (H) 65 - 100 mg/dL  
 BUN 23 (H) 6 - 20 MG/DL  
 Creatinine 1.12 (H) 0.55 - 1.02 MG/DL  
 BUN/Creatinine ratio 21 (H) 12 - 20    
 GFR est AA 58 (L) >60 ml/min/1.73m2  
 GFR est non-AA 48 (L) >60 ml/min/1.73m2  
 Calcium 7.9 (L) 8.5 - 10.1 MG/DL  
 Bilirubin, total 0.6 0.2 - 1.0 MG/DL  
 ALT (SGPT) 19 12 - 78 U/L  
 AST (SGOT) 18 15 - 37 U/L  
 Alk. phosphatase 146 (H) 45 - 117 U/L  
 Protein, total 5.4 (L) 6.4 - 8.2 g/dL  
 Albumin 2.4 (L) 3.5 - 5.0 g/dL  
 Globulin 3.0 2.0 - 4.0 g/dL  
 A-G Ratio 0.8 (L) 1.1 - 2.2    
SAMPLES BEING HELD  
 Collection  Time: 01/01/21  3:30 PM  
Result Value Ref Range SAMPLES BEING HELD [address], [address], [address], [address] [address] BLDCS   
 COMMENT Add-on orders for these samples will be processed based on acceptable specimen integrity and analyte stability, which may vary by analyte. MAGNESIUM Collection Time: 01/01/21  3:30 PM  
Result Value Ref Range Magnesium 2.5 (H) 1.6 - 2.4 mg/dL TROPONIN I Collection Time: 01/01/21  3:30 PM  
Result Value Ref Range Troponin-I, Qt. <0.05 <0.05 ng/mL OCCULT BLOOD, STOOL Collection Time: 01/01/21  4:28 PM  
Result Value Ref Range Occult blood, stool Positive (A) NEG    
RBC, ALLOCATE Collection Time: 01/01/21  4:30 PM  
Result Value Ref Range HISTORY CHECKED? Historical check performed Recent Labs 01/01/21 
1530 WBC 15.6* HGB 4.9* HCT 17.7* * Recent Labs 01/01/21 
1530   
K 4.6  CO2 33* * BUN 23* CREA 1.12* CA 7.9*  
MG 2.5* ALB 2.4* TBILI 0.6 ALT 19 Imaging Xr Chest [facility] Result Date: 1/1/2021 INDICATION: Shortness of breath COMPARISON: November 5, 2020 FINDINGS: AP portable imaging of the chest performed at 3:28 PM demonstrates a stable cardiomediastinal silhouette. There is left basilar consolidation with a probable small left pleural effusion. No significant osseous abnormalities are seen. IMPRESSION: Left basilar consolidation may represent pneumonia or atelectasis. Probable small left pleural effusion. Assessment and Plan Left lower lobe pneumonia 
-Continue Rocephin and azithromycin 
-Pneumonia work-up 
-Check Covid 
-Consult pulmonology Acute hypoxic respiratory failure 
-Continue with supplemental oxygen 
-Patient uses 3 L of oxygen at baseline, wean to this goal as tolerated Anemia 
-Occult blood positive 
-Transfused 2 units PRBC and monitor hemoglobin 
-Consult GI Sepsis 
-Continue treatment of above 
-Follow cultures Elevated creatinine 
-monitor with IVF 
 
Hypertension 
-Continue with metoprolol Type 2 diabetes 
-Check A1c 
-Patient takes Onglyza which we will hold 
-Continue sliding scale insulin and basal insulin Atrial fibrillation/CAD 
-Continue with digoxin and hold Eliquis Anxiety/depression 
-Continue with Cymbalta and Remeron Diet: Diabetic Activity: As tolerated DVT prophylaxis: SCDs Isolation precautions: Droplet plus Signed by:  Uvaldo Patel MD  
 January 1, 2021 at 5:12 PM

## 2021-01-02 NOTE — PROGRESS NOTES
Daily Progress Note: 1/2/2021 Angelica Barber MD 
 
Assessment/Plan:  
Left lower lobe pneumonia 
-Continue Rocephin and azithromycin 
-Pneumonia work-up 
-Covid negative 
-pulmonology to see 
  
Acute hypoxic respiratory failure 
-Continue with supplemental oxygen 
-Patient uses 3 L of oxygen at baseline, wean to this goal as tolerated 
  
Anemia 
-Occult blood positive 
-Transfused 2 units PRBC and monitor hemoglobin 
-Consult GI 
- Anesthesia to place central line 
  
Sepsis 
-Continue treatment of above 
-Follow cultures 
  
Elevated creatinine 
-monitor with IVF 
  
Hypertension 
-Continue with metoprolol 
  
Type 2 diabetes 
-Check A1c 
-Patient takes Onglyza which we will hold 
-Continue sliding scale insulin and basal insulin 
  
Atrial fibrillation/CAD 
-Continue with digoxin and hold Eliquis 
  
Anxiety/depression 
-Continue with Cymbalta and Remeron 
   
 
Problem List: 
Problem List as of 1/2/2021 Date Reviewed: 11/2/2020 Codes Class Noted - Resolved Left lower lobe pneumonia ICD-10-CM: J18.9 ICD-9-CM: 017  1/1/2021 - Present Leukocytosis ICD-10-CM: T57.909 ICD-9-CM: 288.60  11/3/2020 - Present Chronic respiratory failure with hypoxia [facility]) ICD-10-CM: J96.11 
ICD-9-CM: 518.83, 799.02  11/2/2020 - Present Overview Signed 11/2/2020 10:12 PM by Kary Bajwa MD  
  On 3L NC at home Cellulitis of lower extremity ICD-10-CM: L03.119 ICD-9-CM: 682.6  3/23/2020 - Present ASO (arteriosclerosis obliterans) ICD-10-CM: I70.90 ICD-9-CM: 440.9  9/5/2019 - Present PVD (peripheral vascular disease) ([facility] ICD-10-CM: I73.9 ICD-9-CM: 443.9  8/1/2019 - Present Severe obesity ([facility] ICD-10-CM: E66.01 
ICD-9-CM: 278.01  7/30/2019 - Present COPD (chronic obstructive pulmonary disease) (HCC) ICD-10-CM: J44.9 ICD-9-CM: 091  7/13/2019 - Present Normocytic anemia ICD-10-CM: D64.9 ICD-9-CM: 285.9  7/13/2019 - Present PAD (peripheral artery disease) (HCC) ICD-10-CM: I73.9 ICD-9-CM: 443.9  7/13/2019 - Present Mild intermittent asthma ICD-10-CM: J45.20 ICD-9-CM: 493.90  5/17/2019 - Present Brachial artery thrombus (HCC) ICD-10-CM: H16.7 ICD-9-CM: 444.21  4/26/2019 - Present Sleep apnea ICD-10-CM: G47.30 ICD-9-CM: 780.57  1/5/2019 - Present Overview Signed 1/5/2019  8:41 PM by Diana Edmondson DO  
  wears CPAP Atrial fibrillation [facility]) ICD-10-CM: I48.91 
ICD-9-CM: 427.31  11/16/2018 - Present Obesity (BMI 30-39.9) (Chronic) ICD-10-CM: H65.7 ICD-9-CM: 278.00  11/13/2018 - Present Recurrent deep vein thrombosis (DVT) (HCC) ICD-10-CM: I82.409 ICD-9-CM: 453.40  3/30/2018 - Present Chronic anticoagulation (Chronic) ICD-10-CM: Z79.01 
ICD-9-CM: V58.61  3/30/2018 - Present Essential hypertension (Chronic) ICD-10-CM: I10 
ICD-9-CM: 401.9  1/22/2016 - Present CAD (coronary artery disease) ICD-10-CM: I25.10 ICD-9-CM: 414.00  10/9/2015 - Present Type II diabetes mellitus (HCC) (Chronic) ICD-10-CM: E11.9 ICD-9-CM: 250.00  10/9/2015 - Present RESOLVED: Syncope and collapse ICD-10-CM: R55 
ICD-9-CM: 780.2  9/29/2020 - 11/2/2020 RESOLVED: Cellulitis ICD-10-CM: L03.90 ICD-9-CM: 682.9  3/23/2020 - 5/29/2020 RESOLVED: Hypokalemia ICD-10-CM: E87.6 ICD-9-CM: 276.8  3/23/2020 - 5/29/2020 RESOLVED: Hyponatremia ICD-10-CM: E87.1 ICD-9-CM: 276.1  3/23/2020 - 5/29/2020 RESOLVED: Diarrhea ICD-10-CM: R19.7 ICD-9-CM: 787.91  3/23/2020 - 5/29/2020 RESOLVED: Syncope and collapse ICD-10-CM: R55 
ICD-9-CM: 780.2  7/13/2019 - 5/29/2020 RESOLVED: UTI (urinary tract infection) ICD-10-CM: N39.0 ICD-9-CM: 599.0  7/13/2019 - 11/2/2020 RESOLVED: Sepsis ([address] ICD-10-CM: A41.9 ICD-9-CM: 038.9, 995.91  7/13/2019 - 11/3/2020 RESOLVED: Leg wound, left ICD-10-CM: F50.754T ICD-9-CM: 891.0  7/13/2019 - 5/29/2020 RESOLVED: Leg laceration, right, initial encounter ICD-10-CM: V97.478P ICD-9-CM: 894.0  7/13/2019 - 5/29/2020 RESOLVED: Cellulitis of right upper arm ICD-10-CM: L03.113 ICD-9-CM: 682.3  5/17/2019 - 5/29/2020 RESOLVED: Gangrene of finger of right hand ([facility] ICD-10-CM: W02 
ICD-9-CM: 785.4  5/17/2019 - 11/2/2020 RESOLVED: Bowel obstruction ([facility] ICD-10-CM: L83.464 ICD-9-CM: 560.9  1/8/2019 - 5/29/2020 RESOLVED: Partial small bowel obstruction ([facility] ICD-10-CM: K56.600 ICD-9-CM: 560.9  1/5/2019 - 5/29/2020 RESOLVED: Dyspnea ICD-10-CM: R06.00 
ICD-9-CM: 786.09  11/13/2018 - 5/29/2020 RESOLVED: ARF (acute renal failure) (HCC) ICD-10-CM: N17.9 ICD-9-CM: 584.9  11/13/2018 - 5/29/2020 RESOLVED: Closed wedge compression fracture of T7 vertebra ([facility] ICD-10-CM: S96.527T ICD-9-CM: 805.2  11/13/2018 - 5/29/2020 RESOLVED: Type 2 diabetes with nephropathy ([facility] ICD-10-CM: E11.21 
ICD-9-CM: 250.40, 583.81  10/1/2018 - 11/2/2020 RESOLVED: Chest pain ICD-10-CM: R07.9 ICD-9-CM: 786.50  6/27/2018 - 5/29/2020 RESOLVED: Abdominal pain ICD-10-CM: R10.9 ICD-9-CM: 789.00  6/27/2018 - 5/29/2020 RESOLVED: Coronary artery disease involving native coronary artery without angina pectoris (Chronic) ICD-10-CM: I25.10 ICD-9-CM: 414.01  1/22/2016 - 11/2/2020 RESOLVED: HTN (hypertension) ICD-10-CM: I10 
ICD-9-CM: 401.9  10/9/2015 - 1/22/2016 RESOLVED: NSTEMI (non-ST elevated myocardial infarction) ([facility] ICD-10-CM: I21.4 ICD-9-CM: 410.70  10/9/2015 - 5/29/2020 Subjective: Alexandria Lee is a 70 y.o. female who presented with shortness of breath which has been progressively worsening since this weekend. She reports having by cough and sinus congestion. Symptoms acutely worsened this morning. She denies any chest pain, abdominal pain, diarrhea, or fever. She has felt chills. Upon arrival of EMS she was found to have saturations in the 70s. She has a history of chronic COPD and uses 3 L of oxygen at baseline. [Dr Pham [place]ston : Got 2 units PRBCs, but follow up labs unable to be obtained for the past 12 hours. I spoke with anesthesia and they will access her for central line. Pt tolerated PO, +BM yesterday. Says her legs are no more swollen than her baseline. Review of Systems: A comprehensive review of systems was negative except for that written in the HPI. Objective:  
Physical Exam:  
 
Visit Vitals BP (!) 156/73 Pulse 93 Temp 98.2 °F (36.8 °C) Resp 18 Ht 5' 7\" (1.702 m) Wt 115.7 kg (255 lb) SpO2 100% BMI 39.94 kg/m² O2 Flow Rate (L/min): 3 l/min O2 Device: Nasal cannula Temp (24hrs), Av.1 °F (36.7 °C), Min:97.7 °F (36.5 °C), Max:98.3 °F (36.8 °C) No intake/output data recorded.  1901 -  0700 In: 1540 [P.O.:350] Out: 300 [Urine:300] General:  Sleeping, rousable, cooperative, no distress, appears stated age, moon-face. Head:  Normocephalic, without obvious abnormality, atraumatic. Eyes:  Conjunctivae/corneas clear. PERRL, EOMs intact. Nose: Nares normal. Septum midline. Throat: Lips, mucosa, and tongue moist.  
Neck: Supple, symmetrical, trachea midline, no adenopathy, thyroid: no enlargement/tenderness/nodules, no carotid bruit and no JVD. Back:   Symmetric, no curvature. ROM normal. No CVA tenderness. Lungs:   Wheezing at apices, diminished at bases, mildly labored Chest wall:  No tenderness or deformity. Heart:  Regular rate and rhythm, S1, S2 normal, no murmur, click, rub or gallop. Abdomen:   Soft, non-tender. Bowel sounds normal. No masses,  No organomegaly. Extremities: Extremities normal, atraumatic, no cyanosis, 3+ pitting edema. No calf tenderness or cords. Pulses: 2+ and symmetric all extremities. Skin: Skin color, texture, turgor normal. No rashes or lesions Neurologic: CNII-XII intact. Alert and oriented X 3. Fine motor of hands and fingers normal.   equal.  Gait not tested at this time. Sensation grossly normal to touch. Gross motor of extremities normal.    
 
Data Review:  
   
Recent Days: 
Recent Labs 01/01/21 
1530 WBC 15.6* HGB 4.9* HCT 17.7* * Recent Labs 01/01/21 
1530   
K 4.6  CO2 33* * BUN 23* CREA 1.12* CA 7.9*  
MG 2.5* ALB 2.4* ALT 19 No results for input(s): PH, PCO2, PO2, HCO3, FIO2 in the last 72 hours. 24 Hour Results: 
Recent Results (from the past 24 hour(s)) CBC WITH AUTOMATED DIFF Collection Time: 01/01/21  3:30 PM  
Result Value Ref Range WBC 15.6 (H) 3.6 - 11.0 K/uL  
 RBC 2.01 (L) 3.80 - 5.20 M/uL HGB 4.9 (LL) 11.5 - 16.0 g/dL HCT 17.7 (LL) 35.0 - 47.0 % MCV 88.1 80.0 - 99.0 FL  
 MCH 24.4 (L) 26.0 - 34.0 PG  
 MCHC 27.7 (L) 30.0 - 36.5 g/dL  
 RDW 18.0 (H) 11.5 - 14.5 % PLATELET 839 (H) 743 - 400 K/uL MPV 10.8 8.9 - 12.9 FL  
 NRBC 2.1 (H) 0  WBC ABSOLUTE NRBC 0.32 (H) 0.00 - 0.01 K/uL NEUTROPHILS 88 (H) 32 - 75 % BAND NEUTROPHILS 2 0 - 6 % LYMPHOCYTES 3 (L) 12 - 49 % MONOCYTES 6 5 - 13 % EOSINOPHILS 0 0 - 7 % BASOPHILS 0 0 - 1 % MYELOCYTES 1 (H) 0 % IMMATURE GRANULOCYTES 0 %  
 ABS. NEUTROPHILS 14.0 (H) 1.8 - 8.0 K/UL  
 ABS. LYMPHOCYTES 0.5 (L) 0.8 - 3.5 K/UL  
 ABS. MONOCYTES 0.9 0.0 - 1.0 K/UL  
 ABS. EOSINOPHILS 0.0 0.0 - 0.4 K/UL  
 ABS. BASOPHILS 0.0 0.0 - 0.1 K/UL  
 ABS. IMM. GRANS. 0.0 K/UL  
 DF MANUAL    
 RBC COMMENTS ANISOCYTOSIS 2+ 
    
 RBC COMMENTS MICROCYTOSIS 2+ 
    
 WBC COMMENTS TOXIC GRANULATION    
METABOLIC PANEL, COMPREHENSIVE Collection Time: 01/01/21  3:30 PM  
Result Value Ref Range Sodium 139 136 - 145 mmol/L Potassium 4.6 3.5 - 5.1 mmol/L Chloride 104 97 - 108 mmol/L  
 CO2 33 (H) 21 - 32 mmol/L Anion gap 2 (L) 5 - 15 mmol/L Glucose 226 (H) 65 - 100 mg/dL BUN 23 (H) 6 - 20 MG/DL Creatinine 1.12 (H) 0.55 - 1.02 MG/DL  
 BUN/Creatinine ratio 21 (H) 12 - 20 GFR est AA 58 (L) >60 ml/min/1.73m2 GFR est non-AA 48 (L) >60 ml/min/1.73m2 Calcium 7.9 (L) 8.5 - 10.1 MG/DL Bilirubin, total 0.6 0.2 - 1.0 MG/DL  
 ALT (SGPT) 19 12 - 78 U/L  
 AST (SGOT) 18 15 - 37 U/L Alk. phosphatase 146 (H) 45 - 117 U/L Protein, total 5.4 (L) 6.4 - 8.2 g/dL Albumin 2.4 (L) 3.5 - 5.0 g/dL Globulin 3.0 2.0 - 4.0 g/dL A-G Ratio 0.8 (L) 1.1 - 2.2 SAMPLES BEING HELD Collection Time: 01/01/21  3:30 PM  
Result Value Ref Range SAMPLES BEING HELD [address], [address], [address], [address] [address] [facility]   
 COMMENT Add-on orders for these samples will be processed based on acceptable specimen integrity and analyte stability, which may vary by analyte. MAGNESIUM Collection Time: 01/01/21  3:30 PM  
Result Value Ref Range Magnesium 2.5 (H) 1.6 - 2.4 mg/dL TROPONIN I Collection Time: 01/01/21  3:30 PM  
Result Value Ref Range Troponin-I, Qt. <0.05 <0.05 ng/mL TYPE & SCREEN Collection Time: 01/01/21  3:30 PM  
Result Value Ref Range Crossmatch Expiration 01/04/2021,235 ABO/Rh(D) A POSITIVE Antibody screen NEG Unit number [id number] Blood component type RC LR Unit division 00 Status of unit TRANSFUSED Crossmatch result Compatible Unit number [id number] Blood component type  LR Unit division 00 Status of unit ISSUED Crossmatch result Compatible Unit number [id number] Blood component type  LR Unit division 00 Status of unit ALLOCATED Crossmatch result Compatible OCCULT BLOOD, STOOL Collection Time: 01/01/21  4:28 PM  
Result Value Ref Range Occult blood, stool Positive (A) NEG    
RBC, ALLOCATE Collection Time: 01/01/21  4:30 PM  
Result Value Ref Range HISTORY CHECKED? Historical check performed CULTURE, BLOOD, PAIRED Collection Time: 01/01/21  6:19 PM  
 Specimen: Blood Result Value Ref Range Special Requests: NO SPECIAL REQUESTS Culture result: NO GROWTH AFTER 9 HOURS    
SARS-COV-2 Collection Time: 01/01/21  7:20 PM  
Result Value Ref Range Specimen source Nasopharyngeal    
 SARS-CoV-2 PENDING   
 SARS-CoV-2 PENDING Specimen source Nasopharyngeal    
 COVID-19 rapid test PENDING Specimen type NP Swab Health status PENDING   
 COVID-19 PENDING   
GLUCOSE, POC Collection Time: 01/01/21 10:41 PM  
Result Value Ref Range Glucose (POC) 41 (LL) 65 - 100 mg/dL Performed by Nina Paul, POC Collection Time: 01/01/21 10:43 PM  
Result Value Ref Range Glucose (POC) 104 (H) 65 - 100 mg/dL Performed by Nina Paul, POC Collection Time: 01/01/21 11:03 PM  
Result Value Ref Range Glucose (POC) 115 (H) 65 - 100 mg/dL Performed by Dalton Breaux Medications reviewed Current Facility-Administered Medications Medication Dose Route Frequency  0.9% sodium chloride infusion 250 mL  250 mL IntraVENous PRN  
 sodium chloride (NS) flush 5-10 mL  5-10 mL IntraVENous PRN  
 sodium chloride (NS) flush 5-40 mL  5-40 mL IntraVENous Q8H  
 sodium chloride (NS) flush 5-40 mL  5-40 mL IntraVENous PRN  
 acetaminophen (TYLENOL) tablet 650 mg  650 mg Oral Q6H PRN Or  
 acetaminophen (TYLENOL) suppository 650 mg  650 mg Rectal Q6H PRN  polyethylene glycol (MIRALAX) packet 17 g  17 g Oral DAILY PRN  promethazine (PHENERGAN) tablet 12.5 mg  12.5 mg Oral Q6H PRN  Or  
 ondansetron (ZOFRAN) injection 4 mg  4 mg IntraVENous Q6H PRN  
  0.9% sodium chloride infusion  75 mL/hr IntraVENous CONTINUOUS  
 cefTRIAXone (ROCEPHIN) 1 g in sterile water (preservative free) 10 mL IV syringe  1 g IntraVENous Q24H  
 azithromycin (ZITHROMAX) tablet 500 mg  500 mg Oral DAILY  methylPREDNISolone (PF) (SOLU-MEDROL) injection 40 mg  40 mg IntraVENous Q8H  
 ipratropium-albuterol (COMBIVENT RESPIMAT) 20 mcg-100 mcg inhalation spray  1 Puff Inhalation Q4H RT  
 0.9% sodium chloride infusion 250 mL  250 mL IntraVENous PRN  
 glucose chewable tablet 16 g  4 Tab Oral PRN  
 dextrose (D50W) injection syrg 12.5-25 g  25-50 mL IntraVENous PRN  
 glucagon (GLUCAGEN) injection 1 mg  1 mg IntraMUSCular PRN  
 insulin glargine (LANTUS) injection 23 Units  0.2 Units/kg SubCUTAneous QHS  insulin lispro (HUMALOG) injection   SubCUTAneous QID WITH MEALS  cholecalciferol (VITAMIN D3) (1000 Units /25 mcg) tablet 2 Tab  2,000 Units Oral DAILY  cyanocobalamin (VITAMIN B12) tablet 1,000 mcg  1,000 mcg Oral DAILY  digoxin (LANOXIN) tablet 0.125 mg  0.125 mg Oral DAILY  DULoxetine (CYMBALTA) capsule 60 mg  60 mg Oral DAILY  ferrous sulfate tablet 325 mg  325 mg Oral DAILY WITH BREAKFAST  metoprolol tartrate (LOPRESSOR) tablet 25 mg  25 mg Oral DAILY PRN  
 mirtazapine (REMERON) tablet 15 mg  15 mg Oral QHS  montelukast (SINGULAIR) tablet 10 mg  10 mg Oral QPM  
 
 
Care Plan discussed with: Patient/Family and Nurse Total time spent with patient: 30 minutes.  
 
Rosalba Ramirez MD

## 2021-01-02 NOTE — CONSULTS
[facility] Initial Patient Consult Name: Rosanna Kayser MRN: [id number] : 1945 Hospital: [facility] Date: 2021 IMPRESSION:  
· SOB:  ? Related to anemia vs asthma exac. Feels better today after treatment · Asthma/COPD with acute exacerbation:  ? ACOS. On chronic 20 mg prednisone at home. · Chronic hypoxic resp failure · Severe anemia · HAYES · Morbid obesity · Chronic prednisone use RECOMMENDATIONS:  
· Will decrease solumedrol to po prednisone. On 20 mg a day at home · Cont w BD therapy · If no further signs of infection, would stop abx in next day or so · Anemia eval underway Will see again on Mon. Pt appears to only see us rarely in office. Would benefit w better compliance and also should be seen in our sleep office. Will help arrange prior to d/c Subjective: This patient has been seen and evaluated at the request of Dr. Juliane Robison for asthma exacerbation. Patient is a 70 y.o. female who has come intermittently to our office over the yrs who presents w sob. Pt states that she had the abrupt onset of sob yesterday. + cough w scant phlegm and some chest pain. Felt cold but no fevers. Did not feel wheezy. Pt on 20 mg pred for the past few yrs daily and on home oxygen. Pt's CXR with silhouetting of L HD and cardiac border. This is a chronic finding. HGB noted to be 4.9. Pt treated w transfusion, nebs, steroids, and abx and is feeling better Past Medical History:  
Diagnosis Date  Asthma  Atrial fibrillation ([address] 2018  Autoimmune disease ([address]   
 fibromyalgia  CAD (coronary artery disease) 10/9/2015  Closed wedge compression fracture of T7 vertebra ([address] 2018  Diabetes ([address]  DVT (deep vein thrombosis) in pregnancy  GERD (gastroesophageal reflux disease)  Heart failure ([address]  History of vascular access device 09/30/2020  
 4f midline, 12cm at 1cm out placed in L Cephalic by Mina Chowdary RN  
 HTN (hypertension)  NSTEMI (non-ST elevated myocardial infarction) [address] [facility] 10/9/2015  Obesity (BMI 30-39.9) 11/13/2018  Sleep apnea   
 wears CPAP Past Surgical History:  
Procedure Laterality Date  ABDOMEN SURGERY PROC UNLISTED    
 hital hernia repair, gall bladder removed  AMPUTATION TRANSMETACARPAL Right 06/12/2019  
 entire ring finger, 2nd & 3rd fingers (transmetacarpal)  BREAST SURGERY PROCEDURE UNLISTED    
 lumpectomy  CARDIAC SURG PROCEDURE UNLIST  10/9/15  
 2 stents [place]  HX COLOSTOMY    
 and reversal, post episiotomy repair [address] [place]  HX UROLOGICAL  2009  
 bladder sling  IR KYPHOPLASTY LUMBAR  10/8/2020 Prior to Admission medications Medication Sig Start Date End Date Taking? Authorizing Provider  
apixaban (ELIQUIS) 5 mg tablet Take 5 mg by mouth two (2) times a day. Provider, Historical  
alendronate (FOSAMAX) 70 mg tablet Take 70 mg by mouth every thirty (30) days. On the 1st day of each month    Provider, Historical  
zinc oxide 20 % ointment Apply  to affected area three (3) times daily. Bilateral gluteal and perianal area each shift    Provider, Historical  
loperamide (IMODIUM) 2 mg capsule Take 4 mg by mouth two (2) times a day. Provider, Historical  
honey (MediHoney, honey,) 100 % topical paste Apply  to affected area daily. Provider, Historical  
cyanocobalamin 1,000 mcg tablet Take 1 Tab by mouth daily. 10/13/20   Maribel Hardin MD  
digoxin (LANOXIN) 0.125 mg tablet Take 1 Tab by mouth daily. 10/13/20   Maribel Hardin MD  
ferrous sulfate 325 mg (65 mg iron) tablet Take 1 Tab by mouth daily (with breakfast). 10/13/20   Maribel Hardin MD  
tiotropium bromide (Spiriva Respimat) 2.5 mcg/actuation inhaler Take 2 Puffs by inhalation daily.     Provider, Historical  
montelukast (Singulair) 10 mg tablet Take 10 mg by mouth every evening. Provider, Historical  
sAXagliptin (ONGLYZA) 5 mg tab tablet Take 5 mg by mouth Daily (before dinner). Provider, Historical  
metoprolol tartrate (LOPRESSOR) 25 mg tablet Take 25 mg by mouth daily as needed (For systolic >438). Provider, Historical  
acetaminophen (TYLENOL) 325 mg tablet Take 1 Tab by mouth every four (4) hours as needed for Pain. 3/31/20   Dick Lovell MD  
magnesium oxide (MAG-OX) 400 mg tablet Take 400 mg by mouth nightly. Provider, Historical  
potassium chloride SR (KLOR-CON 10) 10 mEq tablet Take 10 mEq by mouth two (2) times a day. Provider, Historical  
predniSONE (DELTASONE) 10 mg tablet Take 10 mg by mouth two (2) times daily (with meals). Provider, Historical  
Omeprazole delayed release (PRILOSEC D/R) 20 mg tablet Take 20 mg by mouth two (2) times a day. Provider, Historical  
mirtazapine (REMERON) 15 mg tablet Take 15 mg by mouth nightly. Provider, Historical  
albuterol (PROVENTIL VENTOLIN) 2.5 mg /3 mL (0.083 %) nebulizer solution 2.5 mg by Nebulization route every six (6) hours as needed for Wheezing or Shortness of Breath. Provider, Historical  
albuterol (PROAIR HFA) 90 mcg/actuation inhaler Take 2 Puffs by inhalation every four (4) hours as needed. Provider, Historical  
lactobacillus rhamnosus gg 10 billion cell (CULTURELLE) 10 billion cell capsule Take 1 Cap by mouth daily. Provider, Historical  
biotin 10,000 mcg cap Take 1 Cap by mouth daily. Provider, Historical  
DULoxetine (CYMBALTA) 60 mg capsule Take 60 mg by mouth daily. Provider, Historical  
cholecalciferol, vitamin D3, (Vitamin D3) 50 mcg (2,000 unit) tab Take 2,000 Units by mouth daily. Provider, Historical  
azelastine-fluticasone (DYMISTA) 137-50 mcg/spray spry 2 Sprays by Nasal route daily as needed (allergies).     Provider, Historical  
mometasone-formoterol (DULERA) 200-5 mcg/actuation HFA inhaler Take 2 Puffs by inhalation two (2) times daily as needed. Provider, Historical  
 
Allergies Allergen Reactions  Advair Diskus [Fluticasone Propion-Salmeterol] Rash  Aspartame Other (comments)  Breo Ellipta [Fluticasone Furoate-Vilanterol] Rash  Bumex [Bumetanide] Myalgia  Ciprofloxacin Rash  Statins-Hmg-Coa Reductase Inhibitors Other (comments) Muscle pain Lipitor/crestor/zocor  Sulfa (Sulfonamide Antibiotics) Rash  Tetracycline Other (comments) musclepain  Torsemide Rash and Myalgia  Zetia [Ezetimibe] Myalgia Social History Tobacco Use  Smoking status: Former Smoker Quit date: 3/30/2017 Years since quitting: 3.7  Smokeless tobacco: Never Used Substance Use Topics  Alcohol use: No  
  Alcohol/week: 0.0 standard drinks Comment: very very rarely Family History Problem Relation Age of Onset  Diabetes Mother  Heart Failure Mother  Kidney Disease Mother  Diabetes Father  Heart Disease Father  Elevated Lipids Father  Heart Failure Father  Heart Disease Brother  Cancer Brother   
     kidney  Diabetes Brother  No Known Problems Brother  No Known Problems Brother Current Facility-Administered Medications Medication Dose Route Frequency  sodium chloride (NS) flush 5-40 mL  5-40 mL IntraVENous Q8H  
 0.9% sodium chloride infusion  75 mL/hr IntraVENous CONTINUOUS  
 cefTRIAXone (ROCEPHIN) 1 g in sterile water (preservative free) 10 mL IV syringe  1 g IntraVENous Q24H  
 azithromycin (ZITHROMAX) tablet 500 mg  500 mg Oral DAILY  methylPREDNISolone (PF) (SOLU-MEDROL) injection 40 mg  40 mg IntraVENous Q8H  
 ipratropium-albuterol (COMBIVENT RESPIMAT) 20 mcg-100 mcg inhalation spray  1 Puff Inhalation Q4H RT  
 insulin glargine (LANTUS) injection 23 Units  0.2 Units/kg SubCUTAneous QHS  insulin lispro (HUMALOG) injection   SubCUTAneous QID WITH MEALS  cholecalciferol (VITAMIN D3) (1000 Units /25 mcg) tablet 2 Tab  2,000 Units Oral DAILY  cyanocobalamin (VITAMIN B12) tablet 1,000 mcg  1,000 mcg Oral DAILY  digoxin (LANOXIN) tablet 0.125 mg  0.125 mg Oral DAILY  DULoxetine (CYMBALTA) capsule 60 mg  60 mg Oral DAILY  ferrous sulfate tablet 325 mg  325 mg Oral DAILY WITH BREAKFAST  mirtazapine (REMERON) tablet 15 mg  15 mg Oral QHS  montelukast (SINGULAIR) tablet 10 mg  10 mg Oral QPM  
 
 
Review of Systems: A comprehensive review of systems was negative except for that written in the HPI. Objective:  
Vital Signs:   
Visit Vitals BP (!) 155/69 (BP 1 Location: Left arm, BP Patient Position: At rest) Pulse 98 Temp 97.5 °F (36.4 °C) Resp 18 Ht 5' 7\" (1.702 m) Wt 115.7 kg (255 lb) SpO2 94% BMI 39.94 kg/m² O2 Device: Nasal cannula O2 Flow Rate (L/min): 3 l/min Temp (24hrs), Av.1 °F (36.7 °C), Min:97.5 °F (36.4 °C), Max:98.3 °F (36.8 °C) Intake/Output:  
Last shift:      No intake/output data recorded. Last 3 shifts:  190 -  0700 In: 1540 [P.O.:350] Out: 300 [Urine:300] Intake/Output Summary (Last 24 hours) at 2021 1147 Last data filed at 2021 2308 Gross per 24 hour Intake 1540 ml Output 300 ml Net 1240 ml Physical Exam:  
General:  Alert, cooperative Head:  Normocephalic, without obvious abnormality, atraumatic. Eyes:  Conjunctivae/corneas clear. PERRL, EOMs intact. Nose: Nares normal.   
Throat: Lips, mucosa, and tongue normal.  
Neck: Supple, symmetrical, trachea midline Back:   Symmetric, no curvature. ROM normal.  
Lungs:   Decreased bs, no dinorah wheeze Chest wall:  No tenderness or deformity. Heart:  Regular rate and rhythm, S1, S2 normal, no murmur, click, rub or gallop. Abdomen:   Soft, non-tender. Obese Extremities: Chronic venous stasis w 2+ edema. Missing digits on R hand Pulses: 2+ and symmetric all extremities. Skin: Skin color, texture, turgor normal. No rashes or lesions Lymph nodes: Cervical, supraclavicular, and axillary nodes normal.  
Neurologic: Grossly nonfocal  
 
Data review:  
 
Recent Results (from the past 24 hour(s)) CBC WITH AUTOMATED DIFF Collection Time: 01/01/21  3:30 PM  
Result Value Ref Range WBC 15.6 (H) 3.6 - 11.0 K/uL  
 RBC 2.01 (L) 3.80 - 5.20 M/uL HGB 4.9 (LL) 11.5 - 16.0 g/dL HCT 17.7 (LL) 35.0 - 47.0 % MCV 88.1 80.0 - 99.0 FL  
 MCH 24.4 (L) 26.0 - 34.0 PG  
 MCHC 27.7 (L) 30.0 - 36.5 g/dL  
 RDW 18.0 (H) 11.5 - 14.5 % PLATELET 944 (H) 739 - 400 K/uL MPV 10.8 8.9 - 12.9 FL  
 NRBC 2.1 (H) 0  WBC ABSOLUTE NRBC 0.32 (H) 0.00 - 0.01 K/uL NEUTROPHILS 88 (H) 32 - 75 % BAND NEUTROPHILS 2 0 - 6 % LYMPHOCYTES 3 (L) 12 - 49 % MONOCYTES 6 5 - 13 % EOSINOPHILS 0 0 - 7 % BASOPHILS 0 0 - 1 % MYELOCYTES 1 (H) 0 % IMMATURE GRANULOCYTES 0 %  
 ABS. NEUTROPHILS 14.0 (H) 1.8 - 8.0 K/UL  
 ABS. LYMPHOCYTES 0.5 (L) 0.8 - 3.5 K/UL  
 ABS. MONOCYTES 0.9 0.0 - 1.0 K/UL  
 ABS. EOSINOPHILS 0.0 0.0 - 0.4 K/UL  
 ABS. BASOPHILS 0.0 0.0 - 0.1 K/UL  
 ABS. IMM. GRANS. 0.0 K/UL  
 DF MANUAL    
 RBC COMMENTS ANISOCYTOSIS 2+ 
    
 RBC COMMENTS MICROCYTOSIS 
2+ WBC COMMENTS TOXIC GRANULATION    
METABOLIC PANEL, COMPREHENSIVE Collection Time: 01/01/21  3:30 PM  
Result Value Ref Range Sodium 139 136 - 145 mmol/L Potassium 4.6 3.5 - 5.1 mmol/L Chloride 104 97 - 108 mmol/L  
 CO2 33 (H) 21 - 32 mmol/L Anion gap 2 (L) 5 - 15 mmol/L Glucose 226 (H) 65 - 100 mg/dL BUN 23 (H) 6 - 20 MG/DL Creatinine 1.12 (H) 0.55 - 1.02 MG/DL  
 BUN/Creatinine ratio 21 (H) 12 - 20 GFR est AA 58 (L) >60 ml/min/1.73m2 GFR est non-AA 48 (L) >60 ml/min/1.73m2 Calcium 7.9 (L) 8.5 - 10.1 MG/DL Bilirubin, total 0.6 0.2 - 1.0 MG/DL  
 ALT (SGPT) 19 12 - 78 U/L  
 AST (SGOT) 18 15 - 37 U/L Alk. phosphatase 146 (H) 45 - 117 U/L Protein, total 5.4 (L) 6.4 - 8.2 g/dL Albumin 2.4 (L) 3.5 - 5.0 g/dL Globulin 3.0 2.0 - 4.0 g/dL A-G Ratio 0.8 (L) 1.1 - 2.2 SAMPLES BEING HELD Collection Time: 01/01/21  3:30 PM  
Result Value Ref Range SAMPLES BEING HELD 1GREEN, [address]RED, [address], [address] [address] BLDCS   
 COMMENT Add-on orders for these samples will be processed based on acceptable specimen integrity and analyte stability, which may vary by analyte. MAGNESIUM Collection Time: 01/01/21  3:30 PM  
Result Value Ref Range Magnesium 2.5 (H) 1.6 - 2.4 mg/dL TROPONIN I Collection Time: 01/01/21  3:30 PM  
Result Value Ref Range Troponin-I, Qt. <0.05 <0.05 ng/mL TYPE & SCREEN Collection Time: 01/01/21  3:30 PM  
Result Value Ref Range Crossmatch Expiration 01/04/2021,2359 ABO/Rh(D) A POSITIVE Antibody screen NEG Unit number [id number] Blood component type  LR Unit division 00 Status of unit TRANSFUSED Crossmatch result Compatible Unit number [id number] Blood component type  LR Unit division 00 Status of unit ISSUED Crossmatch result Compatible Unit number [id number] Blood component type  LR Unit division 00 Status of unit ALLOCATED Crossmatch result Compatible OCCULT BLOOD, STOOL Collection Time: 01/01/21  4:28 PM  
Result Value Ref Range Occult blood, stool Positive (A) NEG    
RBC, ALLOCATE Collection Time: 01/01/21  4:30 PM  
Result Value Ref Range HISTORY CHECKED? Historical check performed CULTURE, BLOOD, PAIRED Collection Time: 01/01/21  6:19 PM  
 Specimen: Blood Result Value Ref Range Special Requests: NO SPECIAL REQUESTS Culture result: NO GROWTH AFTER 13 HOURS    
SARS-COV-2 Collection Time: 01/01/21  7:20 PM  
Result Value Ref Range Specimen source Nasopharyngeal    
 SARS-CoV-2 Not detected NOTD Specimen source Nasopharyngeal    
 Specimen type NP Swab Health status Symptomatic Testing GLUCOSE, POC Collection Time: 01/01/21 10:41 PM  
Result Value Ref Range  Glucose (POC) 41 (LL) 65 - 100 mg/dL Performed by Xochitl Payne, POC Collection Time: 01/01/21 10:43 PM  
Result Value Ref Range Glucose (POC) 104 (H) 65 - 100 mg/dL Performed by Xochitl Payne, POC Collection Time: 01/01/21 11:03 PM  
Result Value Ref Range Glucose (POC) 115 (H) 65 - 100 mg/dL Performed by Xochitl Payne, POC Collection Time: 01/02/21  8:53 AM  
Result Value Ref Range Glucose (POC) 203 (H) 65 - 100 mg/dL Performed by Gloria Khalil, POC Collection Time: 01/02/21 11:36 AM  
Result Value Ref Range Glucose (POC) 181 (H) 65 - 100 mg/dL Performed by Petrona Elizabeth (PCT) Imaging: 
I have personally reviewed the patients radiographs and have reviewed the reports: As above Litzy Stark MD

## 2021-01-02 NOTE — PROGRESS NOTES
Physical Therapy Note: 
  
Orders acknowledged, chart reviewed, discussed with RN. PT evaluation deferred. RN advising PT follow at a later time. Pt pending nursing care and awaiting lab draw s/p 2 units PRBCs. Hgb 4.9 g/dL on admit. Will continue to follow and proceed with PT evaluation when appropriate.  
 
Mani Holt, PT, DPT, [facility]

## 2021-01-02 NOTE — PROGRESS NOTES
Patient arrived on floor between [id number] 
  
Delay because of appropriateness with hemoglobin of 4.9. 
  
Blood already half way through process Finished first unit from ER. Completed second unit of blood.

## 2021-01-02 NOTE — PROGRESS NOTES
Called for IV placement due to patient's need for labs and IV access (admitted for anemia and SOB). IV access obtained using accucath 20G under ultrasound on right AC. Able to draw back on IV for labs VERY SLOWLY to prevent small vein from collapsing.

## 2021-01-02 NOTE — PROGRESS NOTES
Bedside and Verbal shift change report given to [employer] (oncoming nurse) by Marv Ignacio (offgoing nurse). Report included the following information SBAR, Kardex and Recent Results. Still not able to get lab draw. ICU not answering. ER /no tech/ 11 holds. . can't help Asked 5th floor charge. She will attempt after getting report on her 6 patients. (thank you)

## 2021-01-02 NOTE — CONSULTS
Gastroenterology Consult Referring Physician: Russel Pittman 
 
Consult Date: 2021 Subjective:  Difficult to breath Chief Complaint: can't breath History of Present Illness: Yina Orozco is a 70 y.o. female who is seen in consultation for blood los anemia. Myriad medical problems. These include chronic diffuse edema, diarrhea, difficulty breathing with moderate exertion. The latter changed resulting ER visit where worsening of chronic anemia associated with occult blood in bowel movements. Does not have visible blood per rectum, vomiting, melena. With blood transfusions now feels back to her usual.  O2 saturation currently  % on oxygen. Had cologuard number years ago; can't say when any colonoscopy might have been done. Review of 3/20 abd pelvic CT w/o contrast has no diagnosis for neither blood loss nor diarrhea. Ferritin was 22 in September; B12 was 159 Past Medical History:  
Diagnosis Date  Asthma  Atrial fibrillation ([address] 2018  Autoimmune disease ([address]   
 fibromyalgia  CAD (coronary artery disease) 10/9/2015  Closed wedge compression fracture of T7 vertebra ([address] 2018  Diabetes ([address]  DVT (deep vein thrombosis) in pregnancy  GERD (gastroesophageal reflux disease)  Heart failure ([address]  History of vascular access device 2020  
 4f midline, 12cm at 1cm out placed in L Cephalic by Stefanie Colbert RN  
 HTN (hypertension)  NSTEMI (non-ST elevated myocardial infarction) ([address] 10/9/2015  Obesity (BMI 30-39.9) 2018  Sleep apnea   
 wears CPAP Past Surgical History:  
Procedure Laterality Date  ABDOMEN SURGERY PROC UNLISTED    
 hital hernia repair, gall bladder removed  AMPUTATION TRANSMETACARPAL Right 2019  
 entire ring finger, 2nd & 3rd fingers (transmetacarpal)  BREAST SURGERY PROCEDURE UNLISTED    
 lumpectomy  CARDIAC SURG PROCEDURE UNLIST  10/9/15  
 2 stents  HX  63 [facility]  HX COLOSTOMY    
 and reversal, post episiotomy repair [address] [place]  HX UROLOGICAL  2009  
 bladder sling  IR KYPHOPLASTY LUMBAR  10/8/2020 Family History Problem Relation Age of Onset  Diabetes Mother  Heart Failure Mother  Kidney Disease Mother  Diabetes Father  Heart Disease Father  Elevated Lipids Father  Heart Failure Father  Heart Disease Brother  Cancer Brother   
     kidney  Diabetes Brother  No Known Problems Brother  No Known Problems Brother Social History Tobacco Use  Smoking status: Former Smoker Quit date: 3/30/2017 Years since quitting: 3.7  Smokeless tobacco: Never Used Substance Use Topics  Alcohol use: No  
  Alcohol/week: 0.0 standard drinks Comment: very very rarely Allergies Allergen Reactions  Advair Diskus [Fluticasone Propion-Salmeterol] Rash  Aspartame Other (comments)  Breo Ellipta [Fluticasone Furoate-Vilanterol] Rash  Bumex [Bumetanide] Myalgia  Ciprofloxacin Rash  Statins-Hmg-Coa Reductase Inhibitors Other (comments) Muscle pain Lipitor/crestor/zocor  Sulfa (Sulfonamide Antibiotics) Rash  Tetracycline Other (comments) musclepain  Torsemide Rash and Myalgia  Zetia [Ezetimibe] Myalgia Current Facility-Administered Medications Medication Dose Route Frequency  predniSONE (DELTASONE) tablet 20 mg  20 mg Oral BID WITH MEALS  
 0.9% sodium chloride infusion 250 mL  250 mL IntraVENous PRN  
 sodium chloride (NS) flush 5-10 mL  5-10 mL IntraVENous PRN  
 sodium chloride (NS) flush 5-40 mL  5-40 mL IntraVENous Q8H  
 sodium chloride (NS) flush 5-40 mL  5-40 mL IntraVENous PRN  
 acetaminophen (TYLENOL) tablet 650 mg  650 mg Oral Q6H PRN Or  
 acetaminophen (TYLENOL) suppository 650 mg  650 mg Rectal Q6H PRN  polyethylene glycol (MIRALAX) packet 17 g  17 g Oral DAILY PRN  promethazine (PHENERGAN) tablet 12.5 mg  12.5 mg Oral Q6H PRN Or  
 ondansetron (ZOFRAN) injection 4 mg  4 mg IntraVENous Q6H PRN  
 0.9% sodium chloride infusion  75 mL/hr IntraVENous CONTINUOUS  
 cefTRIAXone (ROCEPHIN) 1 g in sterile water (preservative free) 10 mL IV syringe  1 g IntraVENous Q24H  
 azithromycin (ZITHROMAX) tablet 500 mg  500 mg Oral DAILY  ipratropium-albuterol (COMBIVENT RESPIMAT) 20 mcg-100 mcg inhalation spray  1 Puff Inhalation Q4H RT  
 0.9% sodium chloride infusion 250 mL  250 mL IntraVENous PRN  
 glucose chewable tablet 16 g  4 Tab Oral PRN  
 dextrose (D50W) injection syrg 12.5-25 g  25-50 mL IntraVENous PRN  
 glucagon (GLUCAGEN) injection 1 mg  1 mg IntraMUSCular PRN  
 insulin glargine (LANTUS) injection 23 Units  0.2 Units/kg SubCUTAneous QHS  insulin lispro (HUMALOG) injection   SubCUTAneous QID WITH MEALS  cholecalciferol (VITAMIN D3) (1000 Units /25 mcg) tablet 2 Tab  2,000 Units Oral DAILY  cyanocobalamin (VITAMIN B12) tablet 1,000 mcg  1,000 mcg Oral DAILY  digoxin (LANOXIN) tablet 0.125 mg  0.125 mg Oral DAILY  DULoxetine (CYMBALTA) capsule 60 mg  60 mg Oral DAILY  ferrous sulfate tablet 325 mg  325 mg Oral DAILY WITH BREAKFAST  metoprolol tartrate (LOPRESSOR) tablet 25 mg  25 mg Oral DAILY PRN  
 mirtazapine (REMERON) tablet 15 mg  15 mg Oral QHS  montelukast (SINGULAIR) tablet 10 mg  10 mg Oral QPM  
  
 
Review of Systems: A detailed 10 organ review of systems is obtained with pertinent positives as listed in the History of Present Illness and Past Medical History. All others are negative. Objective:  
 
Physical Exam: 
Visit Vitals BP (!) 155/69 (BP 1 Location: Left arm, BP Patient Position: At rest) Pulse 98 Temp 97.5 °F (36.4 °C) Resp 18 Ht 5' 7\" (1.702 m) Wt 115.7 kg (255 lb) SpO2 94% BMI 39.94 kg/m² Skin:  Extremities and face reveal no rashes. No gonzalez erythema. No telangiectasias on the chest wall. HEENT: Sclerae anicteric. Extra-occular muscles are intact. No oral ulcers. No abnormal pigmentation of the lips. The neck is supple. Cardiovascular: Irregular rate and rhythm. No murmurs, gallops, or rubs. Respiratory:  Comfortable breathing with no accessory muscle use. Clear breath sounds with no wheezes, rales, or rhonchi. GI:  Abdomen nondistended, soft, and nontender. Normal active bowel sounds. No enlargement of the liver or spleen. No masses palpable. Musculoskeletal:  Diffuse pitting edema. Extremities have good range of motion. No costovertebral tenderness. Neurological:  Gross memory appears intact. Patient is alert and oriented. Psychiatric:  Mood appears appropriate with judgement intact. Lymphatic:  No cervical or supraclavicular adenopathy. Lab/Data Review: 
CMP:  
Lab Results Component Value Date/Time  01/02/2021 11:10 AM  
 K 4.7 01/02/2021 11:10 AM  
  01/02/2021 11:10 AM  
 CO2 33 (H) 01/02/2021 11:10 AM  
 AGAP 1 (L) 01/02/2021 11:10 AM  
  (H) 01/02/2021 11:10 AM  
 BUN 21 (H) 01/02/2021 11:10 AM  
 CREA 1.03 (H) 01/02/2021 11:10 AM  
 GFRAA >60 01/02/2021 11:10 AM  
 GFRNA 53 (L) 01/02/2021 11:10 AM  
 CA 7.8 (L) 01/02/2021 11:10 AM  
 MG 2.5 (H) 01/01/2021 03:30 PM  
 ALB 2.5 (L) 01/02/2021 11:10 AM  
 TP 5.4 (L) 01/02/2021 11:10 AM  
 GLOB 2.9 01/02/2021 11:10 AM  
 AGRAT 0.9 (L) 01/02/2021 11:10 AM  
 ALT 20 01/02/2021 11:10 AM  
 
CBC:  
Lab Results Component Value Date/Time WBC 14.5 (H) 01/02/2021 11:10 AM  
 HGB 7.8 (L) 01/02/2021 11:10 AM  
 HCT 26.0 (L) 01/02/2021 11:10 AM  
  (H) 01/02/2021 11:10 AM  
 
 
 
Assessment/Plan: Active Problems: 
  Left lower lobe pneumonia (1/1/2021) Chronic blood loss anemi Recommend: 
 
Parenteral B12, iron sucrose Fecal fat I've discussed EGD, colonoscopy possible biopsy, polypectomy, cautery, injection, alternatives, complications including but not limited to pain, cardiopulmonary event, bleeding, perforation requiring additional blood transfusion or operative repair; all questions answered. Thanks

## 2021-01-02 NOTE — PROGRESS NOTES
Patient is extremely difficult blood draw, 
 
I attempted multiple times during night. ICU and ER very busy ,could not help.  
 
Will refer to morning RN

## 2021-01-02 NOTE — ED NOTES
TRANSFER - OUT REPORT: 
 
Verbal report given to 5th Floor RN(name) on [facility]  being transferred to [facility](unit) for routine progression of care Report consisted of patients Situation, Background, Assessment and  
Recommendations(SBAR). Information from the following report(s) SBAR was reviewed with the receiving nurse. Lines:  
Peripheral IV 01/01/21 Right External jugular (Active) Peripheral IV Left Forearm (Active) Opportunity for questions and clarification was provided. Patient transported with: 
 Registered Nurse

## 2021-01-03 NOTE — PROGRESS NOTES
Gastrointestinal Progress Note 1/3/2021 Admit Date: 1/1/2021 Subjective:  
 
New complaint of chest pains last night, chest pain yesterday. She has not had vomiting, visible blood loss; bowel prep has resulted in watery bowel movements without visible blood. Current Facility-Administered Medications Medication Dose Route Frequency  phosphorus (K PHOS NEUTRAL) 250 mg tablet 1 Tab  1 Tab Oral BID  dilTIAZem (CARDIZEM) 100 mg in 0.9% sodium chloride (MBP/ADV) 100 mL infusion  0-10 mg/hr IntraVENous TITRATE  predniSONE (DELTASONE) tablet 20 mg  20 mg Oral BID WITH MEALS  cyanocobalamin (VITAMIN B12) injection 1,000 mcg  1,000 mcg IntraMUSCular DAILY  iron sucrose (VENOFER) injection 100 mg  100 mg IntraVENous DAILY  magnesium citrate solution 296 mL  296 mL Oral BID  
 bisacodyL (DULCOLAX) tablet 10 mg  10 mg Oral Q2H  
 0.9% sodium chloride infusion 250 mL  250 mL IntraVENous PRN  
 sodium chloride (NS) flush 5-10 mL  5-10 mL IntraVENous PRN  
 sodium chloride (NS) flush 5-40 mL  5-40 mL IntraVENous Q8H  
 sodium chloride (NS) flush 5-40 mL  5-40 mL IntraVENous PRN  
 acetaminophen (TYLENOL) tablet 650 mg  650 mg Oral Q6H PRN Or  
 acetaminophen (TYLENOL) suppository 650 mg  650 mg Rectal Q6H PRN  polyethylene glycol (MIRALAX) packet 17 g  17 g Oral DAILY PRN  promethazine (PHENERGAN) tablet 12.5 mg  12.5 mg Oral Q6H PRN Or  
 ondansetron (ZOFRAN) injection 4 mg  4 mg IntraVENous Q6H PRN  
 0.9% sodium chloride infusion  75 mL/hr IntraVENous CONTINUOUS  
 cefTRIAXone (ROCEPHIN) 1 g in sterile water (preservative free) 10 mL IV syringe  1 g IntraVENous Q24H  
 azithromycin (ZITHROMAX) tablet 500 mg  500 mg Oral DAILY  ipratropium-albuterol (COMBIVENT RESPIMAT) 20 mcg-100 mcg inhalation spray  1 Puff Inhalation Q4H RT  
 0.9% sodium chloride infusion 250 mL  250 mL IntraVENous PRN  
 glucose chewable tablet 16 g  4 Tab Oral PRN  
  dextrose (D50W) injection syrg 12.5-25 g  25-50 mL IntraVENous PRN  
 glucagon (GLUCAGEN) injection 1 mg  1 mg IntraMUSCular PRN  
 insulin glargine (LANTUS) injection 23 Units  0.2 Units/kg SubCUTAneous QHS  insulin lispro (HUMALOG) injection   SubCUTAneous QID WITH MEALS  cholecalciferol (VITAMIN D3) (1000 Units /25 mcg) tablet 2 Tab  2,000 Units Oral DAILY  cyanocobalamin (VITAMIN B12) tablet 1,000 mcg  1,000 mcg Oral DAILY  digoxin (LANOXIN) tablet 0.125 mg  0.125 mg Oral DAILY  DULoxetine (CYMBALTA) capsule 60 mg  60 mg Oral DAILY  ferrous sulfate tablet 325 mg  325 mg Oral DAILY WITH BREAKFAST  metoprolol tartrate (LOPRESSOR) tablet 25 mg  25 mg Oral DAILY PRN  
 mirtazapine (REMERON) tablet 15 mg  15 mg Oral QHS  montelukast (SINGULAIR) tablet 10 mg  10 mg Oral QPM  
  
 
Objective:  
 
Blood pressure (!) 144/58, pulse (!) 145, temperature 97.6 °F (36.4 °C), resp. rate 17, height 5' 7\" (1.702 m), weight 118 kg (260 lb 1.6 oz), SpO2 100 %. 01/03 0701 - 01/03 1900 In: 5 [P.O.:420] Out: 2350 [Urine:2350] 01/01 1901 - 01/03 0700 In: 3637.5 [P.O.:650; I.V.:1797.5] Out: 700 [Urine:700] EXAM:  GENERAL: alert, no distress, HEART: irregularly irregular rhythm, LUNGS: chest clear, no wheezing, rales, normal symmetric air entry, ABDOMEN:  Bowel sounds are normal, liver is not enlarged, spleen is not enlarged and EXTREMITY: edema diffusely edematous Data Review Recent Results (from the past 24 hour(s)) GLUCOSE, POC Collection Time: 01/02/21  4:54 PM  
Result Value Ref Range Glucose (POC) 167 (H) 65 - 100 mg/dL Performed by Mile Dejesus, POC Collection Time: 01/02/21  9:44 PM  
Result Value Ref Range Glucose (POC) 57 (L) 65 - 100 mg/dL Performed by Digna Devlin (PCT) GLUCOSE, POC Collection Time: 01/02/21  9:58 PM  
Result Value Ref Range Glucose (POC) 110 (H) 65 - 100 mg/dL Performed by Bobby Diaz GLUCOSE, POC  
 Collection Time: 01/02/21 10:22 PM  
Result Value Ref Range Glucose (POC) 110 (H) 65 - 100 mg/dL Performed by Priscilla Charles (PCT) GLUCOSE, POC Collection Time: 01/02/21 11:46 PM  
Result Value Ref Range Glucose (POC) 166 (H) 65 - 100 mg/dL Performed by Adele Pablo CBC WITH AUTOMATED DIFF Collection Time: 01/03/21  1:59 AM  
Result Value Ref Range WBC 15.4 (H) 3.6 - 11.0 K/uL  
 RBC 2.85 (L) 3.80 - 5.20 M/uL HGB 7.3 (L) 11.5 - 16.0 g/dL HCT 24.5 (L) 35.0 - 47.0 % MCV 86.0 80.0 - 99.0 FL  
 MCH 25.6 (L) 26.0 - 34.0 PG  
 MCHC 29.8 (L) 30.0 - 36.5 g/dL  
 RDW 16.7 (H) 11.5 - 14.5 % PLATELET 728 264 - 375 K/uL MPV 10.0 8.9 - 12.9 FL  
 NRBC 1.7 (H) 0  WBC ABSOLUTE NRBC 0.26 (H) 0.00 - 0.01 K/uL NEUTROPHILS 83 (H) 32 - 75 % LYMPHOCYTES 5 (L) 12 - 49 % MONOCYTES 10 5 - 13 % EOSINOPHILS 0 0 - 7 % BASOPHILS 0 0 - 1 % IMMATURE GRANULOCYTES 2 (H) 0.0 - 0.5 % ABS. NEUTROPHILS 12.8 (H) 1.8 - 8.0 K/UL  
 ABS. LYMPHOCYTES 0.8 0.8 - 3.5 K/UL  
 ABS. MONOCYTES 1.5 (H) 0.0 - 1.0 K/UL  
 ABS. EOSINOPHILS 0.0 0.0 - 0.4 K/UL  
 ABS. BASOPHILS 0.0 0.0 - 0.1 K/UL  
 ABS. IMM. GRANS. 0.3 (H) 0.00 - 0.04 K/UL  
 DF SMEAR SCANNED    
 RBC COMMENTS ANISOCYTOSIS 
1+ METABOLIC PANEL, COMPREHENSIVE Collection Time: 01/03/21  1:59 AM  
Result Value Ref Range Sodium 140 136 - 145 mmol/L Potassium 3.8 3.5 - 5.1 mmol/L Chloride 106 97 - 108 mmol/L  
 CO2 32 21 - 32 mmol/L Anion gap 2 (L) 5 - 15 mmol/L Glucose 150 (H) 65 - 100 mg/dL BUN 21 (H) 6 - 20 MG/DL Creatinine 1.07 (H) 0.55 - 1.02 MG/DL  
 BUN/Creatinine ratio 20 12 - 20 GFR est AA >60 >60 ml/min/1.73m2 GFR est non-AA 50 (L) >60 ml/min/1.73m2 Calcium 7.7 (L) 8.5 - 10.1 MG/DL Bilirubin, total 0.6 0.2 - 1.0 MG/DL  
 ALT (SGPT) 19 12 - 78 U/L  
 AST (SGOT) 23 15 - 37 U/L Alk. phosphatase 146 (H) 45 - 117 U/L Protein, total 5.0 (L) 6.4 - 8.2 g/dL Albumin 2.3 (L) 3.5 - 5.0 g/dL Globulin 2.7 2.0 - 4.0 g/dL A-G Ratio 0.9 (L) 1.1 - 2.2 PHOSPHORUS Collection Time: 01/03/21  1:59 AM  
Result Value Ref Range Phosphorus 2.2 (L) 2.6 - 4.7 MG/DL MAGNESIUM Collection Time: 01/03/21  1:59 AM  
Result Value Ref Range Magnesium 2.4 1.6 - 2.4 mg/dL GLUCOSE, POC Collection Time: 01/03/21  6:28 AM  
Result Value Ref Range Glucose (POC) 101 (H) 65 - 100 mg/dL Performed by Jina Chairez GLUCOSE, POC Collection Time: 01/03/21 12:12 PM  
Result Value Ref Range Glucose (POC) 147 (H) 65 - 100 mg/dL Performed by Kavon Zamudio (PCT) Assessment:  
 
Active Problems: 
  Left lower lobe pneumonia (1/1/2021) Plan: 1. I have discussed blood transfusions, alternatives, potential complications including but not limited to blood transfusion reaction, infection. All questions answered. 2..  Pantoprazole for possible GI contributing factor for chest pains. 3.  While continuing bowel prep for potential examinations tomorrow she needs to be cleared by cardiology before actually executing on endoscopic examination.

## 2021-01-03 NOTE — PROGRESS NOTES
1319- 
TRANSFER - IN REPORT: 
 
Verbal report received from Eugene 19 (name) on Yina Graft  being received from 5th floor (unit) for routine progression of care Report consisted of patients Situation, Background, Assessment and  
Recommendations(SBAR). Information from the following report(s) SBAR, Kardex and Accordion was reviewed with the receiving nurse. Opportunity for questions and clarification was provided. Assessment completed upon patients arrival to unit and care assumed. .. This patient was assisted with Intentional Toileting every 2 hours during this shift. Documentation of ambulation and output reflected on Flowsheet. .. 1930- Bedside and Verbal shift change report given to Justice Small 1721 (oncoming nurse) by Gurinder Morris RN  (offgoing nurse). Report included the following information SBAR, Kardex and Recent Results.

## 2021-01-03 NOTE — PROGRESS NOTES
TRANSFER - OUT REPORT: 
 
[facility] for change in patient condition(Elevated heart rate) Report consisted of patients Situation, Background, Assessment and  
Recommendations(SBAR). Information from the following report(s) SBAR, Kardex, Intake/Output and MAR was reviewed with the receiving nurse. Lines:  
Single Lumen Venous Catheter 01/02/21 Anterior;Proximal;Right Forearm (Active) Central Line Being Utilized No 01/03/21 7069 Criteria for Appropriate Use Limited/no vessel suitable for conventional peripheral access 01/03/21 0420 Site Assessment Clean, dry, & intact 01/03/21 0420 Infiltration Assessment 0 01/03/21 0420 Affected Extremity/Extremities Color distal to insertion site pink (or appropriate for race); Pulses palpable 01/03/21 0420 Dressing Status Clean, dry, & intact 01/03/21 0420 Dressing Type Disk with Chlorhexadine gluconate (CHG); Transparent 01/03/21 0420 Hub Color/Line Status Capped 01/03/21 0420 Positive Blood Return (Medial Site) Yes 01/03/21 0420 Alcohol Cap Used Yes 01/03/21 0420 Peripheral IV 01/01/21 Right External jugular (Active) Site Assessment Clean, dry, & intact 01/03/21 5653 Phlebitis Assessment 0 01/03/21 3049 Infiltration Assessment 0 01/03/21 5335 Dressing Status Intact 01/03/21 9197 Dressing Type Transparent 01/03/21 7454 Hub Color/Line Status Pink 01/03/21 0420 Alcohol Cap Used Yes 01/03/21 0420 Opportunity for questions and clarification was provided. Patient transported with: 3L 
 Registered Nurse

## 2021-01-03 NOTE — PROGRESS NOTES
Acknowledged Physical Therapy consult orders. Chart reviewed. Patient with A-fib with RVR with 's Awaiting telemetry bed in order to start drip. Will defer PT evaluation until medically stabilized.  
Thank you, 
Nellie Barksdale, PT MS

## 2021-01-03 NOTE — PROGRESS NOTES
2148: pt BS 57; pt given apple juice; will recheck BS; BS now 110; notifed provider with order sto hold humalog and giv only 10 units of lantus. Also informed of weeping edema in all extremities and received orders to hold fluid overnight 
 
0700: Bedside shift change report given to [place], RN (oncoming nurse) by López Gonsalves RN (offgoing nurse). Report included the following information SBAR, Kardex, Intake/Output, Recent Results and Cardiac Rhythm ST.

## 2021-01-03 NOTE — CONSULTS
Yakov Barahona MD. [facility] [place] Patient: Bianka Nava : 1945 Today's Date: 1/3/2021 HISTORY OF PRESENT ILLNESS:  
 
History of Present Illness: 
Reason for consult: afib with RVR Ms. Jose Felton is a 69 yo with history of Afib, CAD, HFpEF, DM, Recurrent DVT, COPD on 3L NC at home, PAD s/p left fem pop,  Asthma, HTN and Obesity who is admitted with Acute resp failure, LLL PNA, Anemia and GI Bleed (occult stool + blood), This AM patient went into Afib with RVR, rate 150's --> cardizem IV started then -- HR improved and then later HR in 120's but is back in NSR with  bpm when I see her. She had SOB and chest pain with tachycardia but feels better when I see her. Due to GI bleed her Eliquis has been held. GI workup planned for GI Bleed. She is also on Dig and metoprolol as outpatient. Metoprolol had been held on admission. PAST MEDICAL HISTORY:  
 
Past Medical History:  
Diagnosis Date  Asthma  Atrial fibrillation ([address] 2018  Autoimmune disease ([address]   
 fibromyalgia  CAD (coronary artery disease) 10/9/2015  Closed wedge compression fracture of T7 vertebra ([address] 2018  COPD (chronic obstructive pulmonary disease) (HCC)  Diabetes ([address]  DVT (deep vein thrombosis) in pregnancy  GERD (gastroesophageal reflux disease)  Heart failure ([address]  History of vascular access device 2020  
 4f midline, 12cm at 1cm out placed in L Cephalic by Cha Haas RN  
 HTN (hypertension)  NSTEMI (non-ST elevated myocardial infarction) ([address] 10/9/2015  Obesity (BMI 30-39.9) 2018  PAD (peripheral artery disease) ([address] prior stenting  Sleep apnea   
 wears CPAP  
 Statin intolerance Past Surgical History:  
Procedure Laterality Date [place]  HX COLOSTOMY    
 and reversal, post episiotomy repair [address] [place]  HX UROLOGICAL  2009  
 bladder sling  IR KYPHOPLASTY LUMBAR  10/8/2020  IL ABDOMEN SURGERY PROC UNLISTED    
 hital hernia repair, gall bladder removed  IL AMPUTATION TRANSMETACARPAL Right 06/12/2019  
 entire ring finger, 2nd & 3rd fingers (transmetacarpal)  IL BREAST SURGERY PROCEDURE UNLISTED    
 lumpectomy  IL CARDIAC SURG PROCEDURE UNLIST  10/9/15  
 2 stents MEDICATIONS:  
 
Current Facility-Administered Medications Medication Dose Route Frequency Provider Last Rate Last Admin  phosphorus (K PHOS NEUTRAL) 250 mg tablet 1 Tab  1 Tab Oral BID Suri SKINNER MD   1 Tab at 01/03/21 1700  
 dilTIAZem (CARDIZEM) 100 mg in 0.9% sodium chloride (MBP/ADV) 100 mL infusion  0-10 mg/hr IntraVENous TITRATE Juliana Motley MD 5 mL/hr at 01/03/21 1400 5 mg/hr at 01/03/21 1400  pantoprazole (PROTONIX) tablet 40 mg  40 mg Oral ACB Mady Giron MD   40 mg at 01/03/21 1649  
 0.9% sodium chloride infusion 250 mL  250 mL IntraVENous PRN Mady Giron MD 15 mL/hr at 01/03/21 1646 250 mL at 01/03/21 1646  predniSONE (DELTASONE) tablet 20 mg  20 mg Oral BID WITH MEALS Hopewell Martin Myles MD   20 mg at 01/03/21 1649  cyanocobalamin (VITAMIN B12) injection 1,000 mcg  1,000 mcg IntraMUSCular DAILY Mady Giron MD   1,000 mcg at 01/03/21 7337  
 iron sucrose (VENOFER) injection 100 mg  100 mg IntraVENous DAILY Mady Giron MD   100 mg at 01/03/21 4941  magnesium citrate solution 296 mL  296 mL Oral BID Mady Giron MD   Stopped at 01/03/21 0900  
 bisacodyL (DULCOLAX) tablet 10 mg  10 mg Oral Mitesh Schwartz MD   10 mg at 01/03/21 1646  
 0.9% sodium chloride infusion 250 mL  250 mL IntraVENous PRN Charlie Bustillo MD      
 sodium chloride (NS) flush 5-10 mL  5-10 mL IntraVENous PRN Charlie Bustillo MD      
 sodium chloride (NS) flush 5-40 mL  5-40 mL IntraVENous Q8H Maciel Gonzalez MD   10 mL at 01/03/21 1651  sodium chloride (NS) flush 5-40 mL  5-40 mL IntraVENous PRN Maciel Gonzalez MD      
  acetaminophen (TYLENOL) tablet 650 mg  650 mg Oral Q6H PRN Kaveh Stone MD      
 Or  
 acetaminophen (TYLENOL) suppository 650 mg  650 mg Rectal Q6H PRN Kaveh Stone MD      
 polyethylene glycol [facility]) packet 17 g  17 g Oral DAILY PRN Kaveh Stone MD      
 promethazine (PHENERGAN) tablet 12.5 mg  12.5 mg Oral Q6H PRN Kaveh Stone MD      
 Or  
 ondansetron [facility]) injection 4 mg  4 mg IntraVENous Q6H PRN Kaveh Stone MD      
 0.9% sodium chloride infusion  75 mL/hr IntraVENous CONTINUOUS Kaveh Stone MD 75 mL/hr at 01/03/21 1750 75 mL/hr at 01/03/21 1750  cefTRIAXone (ROCEPHIN) 1 g in sterile water (preservative free) 10 mL IV syringe  1 g IntraVENous Q24H Kaveh Stone MD   1 g at 01/03/21 1701  
 azithromycin (ZITHROMAX) tablet 500 mg  500 mg Oral DAILY Kaveh Stone MD   500 mg at 01/02/21 1703  ipratropium-albuterol (COMBIVENT RESPIMAT) 20 mcg-100 mcg inhalation spray  1 Puff Inhalation Q4H RT Kaveh Stone MD   1 Puff at 01/03/21 1617  
 0.9% sodium chloride infusion 250 mL  250 mL IntraVENous PRN Kaveh Stone MD      
 glucose chewable tablet 16 g  4 Tab Oral PRN Kaveh Stone MD      
 dextrose (D50W) injection syrg 12.5-25 g  25-50 mL IntraVENous PRN Kaveh Stone MD      
 glucagon [facility]) injection 1 mg  1 mg IntraMUSCular PRN Kaveh Stone MD      
 insulin glargine (LANTUS) injection 23 Units  0.2 Units/kg SubCUTAneous QHS Kaveh Stone MD   10 Units at 01/02/21 2242  insulin lispro (HUMALOG) injection   SubCUTAneous QID WITH MEALS Kaveh Stone MD   2 Units at 01/03/21 1701  cholecalciferol (VITAMIN D3) (1000 Units /25 mcg) tablet 2 Tab  2,000 Units Oral DAILY Kaveh Stone MD   2 Tab at 01/03/21 6187  cyanocobalamin (VITAMIN B12) tablet 1,000 mcg  1,000 mcg Oral DAILY Kaveh Stone MD   1,000 mcg at 01/03/21 2410  digoxin (LANOXIN) tablet 0.125 mg  0.125 mg Oral DAILY Kaveh Stone MD   0.125 mg at 01/03/21 6124  DULoxetine (CYMBALTA) capsule 60 mg  60 mg Oral DAILY Bryan Acevedo MD   60 mg at 01/03/21 4768  
 metoprolol tartrate (LOPRESSOR) tablet 25 mg  25 mg Oral DAILY PRN Bryan Acevedo MD      
 mirtazapine (REMERON) tablet 15 mg  15 mg Oral QHS Bryan Acevedo MD   15 mg at 01/02/21 2242  
 montelukast (SINGULAIR) tablet 10 mg  10 mg Oral QPM Bryan Acevedo MD   10 mg at 01/03/21 1700 Medications Prior to Admission Medication Sig  
 apixaban (ELIQUIS) 5 mg tablet Take 5 mg by mouth two (2) times a day.  alendronate (FOSAMAX) 70 mg tablet Take 70 mg by mouth every thirty (30) days. On the 1st day of each month  zinc oxide 20 % ointment Apply  to affected area three (3) times daily. Bilateral gluteal and perianal area each shift  loperamide (IMODIUM) 2 mg capsule Take 4 mg by mouth two (2) times a day.  honey (MediHoney, honey,) 100 % topical paste Apply  to affected area daily.  cyanocobalamin 1,000 mcg tablet Take 1 Tab by mouth daily.  digoxin (LANOXIN) 0.125 mg tablet Take 1 Tab by mouth daily.  ferrous sulfate 325 mg (65 mg iron) tablet Take 1 Tab by mouth daily (with breakfast).  tiotropium bromide (Spiriva Respimat) 2.5 mcg/actuation inhaler Take 2 Puffs by inhalation daily.  montelukast (Singulair) 10 mg tablet Take 10 mg by mouth every evening.  sAXagliptin (ONGLYZA) 5 mg tab tablet Take 5 mg by mouth Daily (before dinner).  metoprolol tartrate (LOPRESSOR) 25 mg tablet Take 25 mg by mouth daily as needed (For systolic >533).  acetaminophen (TYLENOL) 325 mg tablet Take 1 Tab by mouth every four (4) hours as needed for Pain.  magnesium oxide (MAG-OX) 400 mg tablet Take 400 mg by mouth nightly.  potassium chloride SR (KLOR-CON 10) 10 mEq tablet Take 10 mEq by mouth two (2) times a day.  predniSONE (DELTASONE) 10 mg tablet Take 10 mg by mouth two (2) times daily (with meals).  Omeprazole delayed release (PRILOSEC D/R) 20 mg tablet Take 20 mg by mouth two (2) times a day.  mirtazapine (REMERON) 15 mg tablet Take 15 mg by mouth nightly.  albuterol (PROVENTIL VENTOLIN) 2.5 mg /3 mL (0.083 %) nebulizer solution 2.5 mg by Nebulization route every six (6) hours as needed for Wheezing or Shortness of Breath.  albuterol (PROAIR HFA) 90 mcg/actuation inhaler Take 2 Puffs by inhalation every four (4) hours as needed.  lactobacillus rhamnosus gg 10 billion cell (CULTURELLE) 10 billion cell capsule Take 1 Cap by mouth daily.  biotin 10,000 mcg cap Take 1 Cap by mouth daily.  DULoxetine (CYMBALTA) 60 mg capsule Take 60 mg by mouth daily.  cholecalciferol, vitamin D3, (Vitamin D3) 50 mcg (2,000 unit) tab Take 2,000 Units by mouth daily.  azelastine-fluticasone (DYMISTA) 137-50 mcg/spray spry 2 Sprays by Nasal route daily as needed (allergies).  mometasone-formoterol (DULERA) 200-5 mcg/actuation HFA inhaler Take 2 Puffs by inhalation two (2) times daily as needed. Allergies Allergen Reactions  Advair Diskus [Fluticasone Propion-Salmeterol] Rash  Aspartame Other (comments)  Breo Ellipta [Fluticasone Furoate-Vilanterol] Rash  Bumex [Bumetanide] Myalgia  Ciprofloxacin Rash  Statins-Hmg-Coa Reductase Inhibitors Other (comments) Muscle pain Lipitor/crestor/zocor  Sulfa (Sulfonamide Antibiotics) Rash  Tetracycline Other (comments) musclepain  Torsemide Rash and Myalgia  Zetia [Ezetimibe] Myalgia SOCIAL HISTORY:  
 
Social History Tobacco Use  Smoking status: Former Smoker Quit date: 3/30/2017 Years since quitting: 3.7  Smokeless tobacco: Never Used Substance Use Topics  Alcohol use: No  
  Alcohol/week: 0.0 standard drinks Comment: very very rarely  Drug use: No  
 
 
 
FAMILY HISTORY:  
 
Family History Problem Relation Age of Onset  Diabetes Mother  Heart Failure Mother  Kidney Disease Mother  Diabetes Father  Heart Disease Father  Elevated Lipids Father  Heart Failure Father  Heart Disease Brother  Cancer Brother   
     kidney  Diabetes Brother  No Known Problems Brother  No Known Problems Brother REVIEW OF SYMPTOMS:  
 
Review of Symptoms: 
Constitutional: Negative for fever, HEENT: Negative for nosebleeds, tinnitus, and vision changes. Respiratory: + SOB Cardiovascular: Negative for syncope Gastrointestinal: Negative for abdominal pain, Genitourinary: Negative for dysuria Musculoskeletal: Negative for myalgias. Skin: Negative for rash Heme: + GI Bleed Neurological: Negative for speech change and focal weakness. PHYSICAL EXAM:  
 
Physical Exam: 
Visit Vitals BP (!) 120/47 (BP 1 Location: Left arm, BP Patient Position: At rest) Pulse (!) 108 Temp 98.1 °F (36.7 °C) Resp 20 Ht 5' 7\" (1.702 m) Wt 260 lb 1.6 oz (118 kg) SpO2 98% BMI 40.74 kg/m² Patient appears generally well, mood and affect are appropriate and pleasant. NAD HEENT:  Hearing intact, non-icteric, normocephalic, atraumatic. Neck Exam: Supple Lung Exam: Clear to auscultation ant  
Cardiac Exam: Regular rate and rhythm with 2/6 systolic murmur Abdomen: Soft, non-tender, obese Extremities: Moves all ext well. + Bilat LE edema with chronic stasis changes MSKTL: Overall good ROM ext Skin: + chronic venous stasis changes Psych: Appropriate affect Neuro - Grossly intact LABS / OTHER STUDIES:  
 
Recent Results (from the past 24 hour(s)) GLUCOSE, POC Collection Time: 01/02/21  9:44 PM  
Result Value Ref Range Glucose (POC) 57 (L) 65 - 100 mg/dL Performed by Nathaniel Land (PCT) GLUCOSE, POC Collection Time: 01/02/21  9:58 PM  
Result Value Ref Range Glucose (POC) 110 (H) 65 - 100 mg/dL Performed by Adenike Germain GLUCOSE, POC  Collection Time: 01/02/21 10:22 PM  
 Result Value Ref Range Glucose (POC) 110 (H) 65 - 100 mg/dL Performed by Clint Rojas (PCT) GLUCOSE, POC Collection Time: 01/02/21 11:46 PM  
Result Value Ref Range Glucose (POC) 166 (H) 65 - 100 mg/dL Performed by Randolph Dennis CBC WITH AUTOMATED DIFF Collection Time: 01/03/21  1:59 AM  
Result Value Ref Range WBC 15.4 (H) 3.6 - 11.0 K/uL  
 RBC 2.85 (L) 3.80 - 5.20 M/uL HGB 7.3 (L) 11.5 - 16.0 g/dL HCT 24.5 (L) 35.0 - 47.0 % MCV 86.0 80.0 - 99.0 FL  
 MCH 25.6 (L) 26.0 - 34.0 PG  
 MCHC 29.8 (L) 30.0 - 36.5 g/dL  
 RDW 16.7 (H) 11.5 - 14.5 % PLATELET 435 916 - 284 K/uL MPV 10.0 8.9 - 12.9 FL  
 NRBC 1.7 (H) 0  WBC ABSOLUTE NRBC 0.26 (H) 0.00 - 0.01 K/uL NEUTROPHILS 83 (H) 32 - 75 % LYMPHOCYTES 5 (L) 12 - 49 % MONOCYTES 10 5 - 13 % EOSINOPHILS 0 0 - 7 % BASOPHILS 0 0 - 1 % IMMATURE GRANULOCYTES 2 (H) 0.0 - 0.5 % ABS. NEUTROPHILS 12.8 (H) 1.8 - 8.0 K/UL  
 ABS. LYMPHOCYTES 0.8 0.8 - 3.5 K/UL  
 ABS. MONOCYTES 1.5 (H) 0.0 - 1.0 K/UL  
 ABS. EOSINOPHILS 0.0 0.0 - 0.4 K/UL  
 ABS. BASOPHILS 0.0 0.0 - 0.1 K/UL  
 ABS. IMM. GRANS. 0.3 (H) 0.00 - 0.04 K/UL  
 DF SMEAR SCANNED    
 RBC COMMENTS ANISOCYTOSIS 
1+ METABOLIC PANEL, COMPREHENSIVE Collection Time: 01/03/21  1:59 AM  
Result Value Ref Range Sodium 140 136 - 145 mmol/L Potassium 3.8 3.5 - 5.1 mmol/L Chloride 106 97 - 108 mmol/L  
 CO2 32 21 - 32 mmol/L Anion gap 2 (L) 5 - 15 mmol/L Glucose 150 (H) 65 - 100 mg/dL BUN 21 (H) 6 - 20 MG/DL Creatinine 1.07 (H) 0.55 - 1.02 MG/DL  
 BUN/Creatinine ratio 20 12 - 20 GFR est AA >60 >60 ml/min/1.73m2 GFR est non-AA 50 (L) >60 ml/min/1.73m2 Calcium 7.7 (L) 8.5 - 10.1 MG/DL Bilirubin, total 0.6 0.2 - 1.0 MG/DL  
 ALT (SGPT) 19 12 - 78 U/L  
 AST (SGOT) 23 15 - 37 U/L Alk. phosphatase 146 (H) 45 - 117 U/L Protein, total 5.0 (L) 6.4 - 8.2 g/dL Albumin 2.3 (L) 3.5 - 5.0 g/dL Globulin 2.7 2.0 - 4.0 g/dL A-G Ratio 0.9 (L) 1.1 - 2.2 PHOSPHORUS Collection Time: 01/03/21  1:59 AM  
Result Value Ref Range Phosphorus 2.2 (L) 2.6 - 4.7 MG/DL MAGNESIUM Collection Time: 01/03/21  1:59 AM  
Result Value Ref Range Magnesium 2.4 1.6 - 2.4 mg/dL GLUCOSE, POC Collection Time: 01/03/21  6:28 AM  
Result Value Ref Range Glucose (POC) 101 (H) 65 - 100 mg/dL Performed by Alicia Oneal GLUCOSE, POC Collection Time: 01/03/21 12:12 PM  
Result Value Ref Range Glucose (POC) 147 (H) 65 - 100 mg/dL Performed by Carolyne Conway (PCT) RBC, ALLOCATE Collection Time: 01/03/21  2:45 PM  
Result Value Ref Range HISTORY CHECKED? Historical check performed GLUCOSE, POC Collection Time: 01/03/21  4:44 PM  
Result Value Ref Range Glucose (POC) 145 (H) 65 - 100 mg/dL Performed by Rajat Nicole CARDIAC DIAGNOSTICS:  
 
Cardiac Evaluation Includes: 
 
Cath -1/15 -  PCI to RCA with BMS ( NSTEMI 10/2015); Cath 6/6/18- LAD Stent Resolute(SAMUEL/D1 PTCA (at [facility]) Echo 6/18 - TDS.  LVEF 60% Cath 10/19/18- patent stents, EF 60%) Echo 7/2019: EF 61-65%, mildly increased wall thickness PAD-9/2019 Left fem-pop bypass with PTFE; followed by Dr. Anthony Villa 
 
 
09/29/20 ECHO ADULT COMPLETE 09/30/2020 9/30/2020 Narrative · LV: Estimated LVEF is 60 - 65%. Visually measured ejection fraction. Normal cavity size and systolic function (ejection fraction normal). Mild  
concentric hypertrophy. Wall motion: normal. 
· AV: With no evidence of reduced excursion. Signed by: Jo Gutierres MD  
 
EKG 9/29/20 - sinus, low voltage EKG 10/2/20 - Afib with RVR,  
  
Tele 1/3/21 - ? MAT vs Afib ASSESSMENT AND PLAN:  
 
Assessment and Plan: Ms. Lynn Chapman is a 71 yo with history of Afib, CAD, HFpEF, DM, Recurrent DVT, COPD on 3L NC at home, PAD s/p left fem pop,  Asthma, HTN and Obesity who is admitted with Acute resp failure, LLL PNA, Anemia and GI Bleed (occult stool + blood),  
 
1) History of PAF with Afib with RVR vs MAT on 1/3/21  
- She has a history of PAF 
- On 1/3/21 she had tachycardia -- I thought initially Afib with RVR, however it looks more like MAT when HR slows down ---> treatment regardless will be the same for her. She needs rate control --> will use Cardizem  -- of note, she is back in sinus this evening --> will start short acting PO cardizem which can make long acting later  
- long term she should be anticoagulated (has been on Eliquis), but this is held currently due to GI bleed 2) Acute resp Failure / COPD exacerbation / PNA 
- per medicine and Pulm teams 3) Anemia and heme positive stools - per Medicine and GO 4) History of CAD, PAD 
- intolerant to statins 5) History of HFpEF 
- she has chronic LE edema  
- her breathing seems to be OK  
- Will reassess need for diuresis going forward 6) Will sign out to Dr. Edwardo Rubi who knows her well Regi Benoit MD, [facility] [place] [address] [facility], [address]  [facility] [address] Ph: [phone]   Ph [phone]

## 2021-01-03 NOTE — PROGRESS NOTES
1109 
Received call from telemetry, patient converted to a fib with rvr in the 150s. RN to assess patient and vitals. Pt asymptomatic, vitals stable other than heart rate. Notified Dr. Aletha De La Cruz. MD stated to order one time dose of 10 mg IV cardizem and to place transfer orders to telemetry. 2298 Cardizem administered. 5134 Pt converted back to NSR. Will continue to monitor. 4594 Pt back in a fib rvr. Pt states she \"feels funny\" now. BP 120s/70s. Nursing supervisor to call back from bed placement. 9080 Pt called out stating she \"can feel her heart jumping around\". Pt still in afib with rvr with rates getting in the 160s. MD aware. Still awaiting telemetry bed in order to start drip.

## 2021-01-03 NOTE — PROGRESS NOTES
Bedside and Verbal shift change report given to Nolberto Sheets (oncoming nurse) by [employer] (offgoing nurse). Report included the following information SBAR, Kardex and MAR.

## 2021-01-03 NOTE — PROGRESS NOTES
Daily Progress Note: 1/3/2021 Pa Arcos MD 
 
Assessment/Plan:  
Left lower lobe pneumonia 
-Continue Rocephin and azithromycin 
-Pneumonia work-up 
-Covid negative 
-pulmonology following 
  
Acute hypoxic respiratory failure 
-Continue with supplemental oxygen 
-Patient uses 3 L of oxygen at baseline, wean to this goal as tolerated Afib with RVR, fluid overloaded 
-dilt drip 
-transfer to telemetry 
-check mag/phos and replete PRN 
-Continue with digoxin and hold Eliquis 
-Consult cardiology 
- lasix 40mg IV today 
  
Anemia- Hgb 7.3 today 
-Occult blood positive 
-Transfused 2 units PRBC  
-GI on board- plan to scope tomorrow if medically stable - Anesthesia was needed for IV access 
  
Sepsis 
-Continue treatment of above 
-Follow cultures 
  
Elevated creatinine 
-monitor closely after diuresis 
  
Hypertension 
-Continue with metoprolol 
  
Type 2 diabetes: sugars soft. A1c 5.9 
-Patient takes Onglyza which we will hold 
-Continue sliding scale insulin  
-lantus halved to 10 
   
Anxiety/depression 
-Continue with Cymbalta and Remeron 
   
 
Problem List: 
Problem List as of 1/3/2021 Date Reviewed: 11/2/2020 Codes Class Noted - Resolved Left lower lobe pneumonia ICD-10-CM: J18.9 ICD-9-CM: 652  1/1/2021 - Present Leukocytosis ICD-10-CM: J22.648 ICD-9-CM: 288.60  11/3/2020 - Present Chronic respiratory failure with hypoxia [facility]) ICD-10-CM: J96.11 
ICD-9-CM: 518.83, 799.02  11/2/2020 - Present Overview Signed 11/2/2020 10:12 PM by Marie Clark MD  
  On 3L NC at home Cellulitis of lower extremity ICD-10-CM: L03.119 ICD-9-CM: 682.6  3/23/2020 - Present ASO (arteriosclerosis obliterans) ICD-10-CM: I70.90 ICD-9-CM: 440.9  9/5/2019 - Present PVD (peripheral vascular disease) ([facility] ICD-10-CM: I73.9 ICD-9-CM: 443.9  8/1/2019 - Present  Severe obesity ([facility] ICD-10-CM: E66.01 
 ICD-9-CM: 278.01  7/30/2019 - Present COPD (chronic obstructive pulmonary disease) (HCC) ICD-10-CM: J44.9 ICD-9-CM: 563  7/13/2019 - Present Normocytic anemia ICD-10-CM: D64.9 ICD-9-CM: 285.9  7/13/2019 - Present PAD (peripheral artery disease) (HCC) ICD-10-CM: I73.9 ICD-9-CM: 443.9  7/13/2019 - Present Mild intermittent asthma ICD-10-CM: J45.20 ICD-9-CM: 493.90  5/17/2019 - Present Brachial artery thrombus (HCC) ICD-10-CM: B34.5 ICD-9-CM: 444.21  4/26/2019 - Present Sleep apnea ICD-10-CM: G47.30 ICD-9-CM: 780.57  1/5/2019 - Present Overview Signed 1/5/2019  8:41 PM by Ilene Ferguson DO  
  wears CPAP Atrial fibrillation [facility]) ICD-10-CM: I48.91 
ICD-9-CM: 427.31  11/16/2018 - Present Obesity (BMI 30-39.9) (Chronic) ICD-10-CM: Z75.9 ICD-9-CM: 278.00  11/13/2018 - Present Recurrent deep vein thrombosis (DVT) (HCC) ICD-10-CM: I82.409 ICD-9-CM: 453.40  3/30/2018 - Present Chronic anticoagulation (Chronic) ICD-10-CM: Z79.01 
ICD-9-CM: V58.61  3/30/2018 - Present Essential hypertension (Chronic) ICD-10-CM: I10 
ICD-9-CM: 401.9  1/22/2016 - Present CAD (coronary artery disease) ICD-10-CM: I25.10 ICD-9-CM: 414.00  10/9/2015 - Present Type II diabetes mellitus (HCC) (Chronic) ICD-10-CM: E11.9 ICD-9-CM: 250.00  10/9/2015 - Present RESOLVED: Syncope and collapse ICD-10-CM: R55 
ICD-9-CM: 780.2  9/29/2020 - 11/2/2020 RESOLVED: Cellulitis ICD-10-CM: L03.90 ICD-9-CM: 682.9  3/23/2020 - 5/29/2020 RESOLVED: Hypokalemia ICD-10-CM: E87.6 ICD-9-CM: 276.8  3/23/2020 - 5/29/2020 RESOLVED: Hyponatremia ICD-10-CM: E87.1 ICD-9-CM: 276.1  3/23/2020 - 5/29/2020 RESOLVED: Diarrhea ICD-10-CM: R19.7 ICD-9-CM: 787.91  3/23/2020 - 5/29/2020 RESOLVED: Syncope and collapse ICD-10-CM: R55 
ICD-9-CM: 780.2  7/13/2019 - 5/29/2020 RESOLVED: UTI (urinary tract infection) ICD-10-CM: N39.0 ICD-9-CM: 599.0  7/13/2019 - 11/2/2020 RESOLVED: Sepsis ([facility] ICD-10-CM: A41.9 ICD-9-CM: 038.9, 995.91  7/13/2019 - 11/3/2020 RESOLVED: Leg wound, left ICD-10-CM: X59.290T ICD-9-CM: 891.0  7/13/2019 - 5/29/2020 RESOLVED: Leg laceration, right, initial encounter ICD-10-CM: T73.032Q ICD-9-CM: 894.0  7/13/2019 - 5/29/2020 RESOLVED: Cellulitis of right upper arm ICD-10-CM: L03.113 ICD-9-CM: 682.3  5/17/2019 - 5/29/2020 RESOLVED: Gangrene of finger of right hand ([facility] ICD-10-CM: S75 
ICD-9-CM: 785.4  5/17/2019 - 11/2/2020 RESOLVED: Bowel obstruction ([facility] ICD-10-CM: Y42.270 ICD-9-CM: 560.9  1/8/2019 - 5/29/2020 RESOLVED: Partial small bowel obstruction ([facility] ICD-10-CM: K56.600 ICD-9-CM: 560.9  1/5/2019 - 5/29/2020 RESOLVED: Dyspnea ICD-10-CM: R06.00 
ICD-9-CM: 786.09  11/13/2018 - 5/29/2020 RESOLVED: ARF (acute renal failure) (HCC) ICD-10-CM: N17.9 ICD-9-CM: 584.9  11/13/2018 - 5/29/2020 RESOLVED: Closed wedge compression fracture of T7 vertebra ([facility] ICD-10-CM: L18.988S ICD-9-CM: 805.2  11/13/2018 - 5/29/2020 RESOLVED: Type 2 diabetes with nephropathy ([facility] ICD-10-CM: E11.21 
ICD-9-CM: 250.40, 583.81  10/1/2018 - 11/2/2020 RESOLVED: Chest pain ICD-10-CM: R07.9 ICD-9-CM: 786.50  6/27/2018 - 5/29/2020 RESOLVED: Abdominal pain ICD-10-CM: R10.9 ICD-9-CM: 789.00  6/27/2018 - 5/29/2020 RESOLVED: Coronary artery disease involving native coronary artery without angina pectoris (Chronic) ICD-10-CM: I25.10 ICD-9-CM: 414.01  1/22/2016 - 11/2/2020 RESOLVED: HTN (hypertension) ICD-10-CM: I10 
ICD-9-CM: 401.9  10/9/2015 - 1/22/2016 RESOLVED: NSTEMI (non-ST elevated myocardial infarction) ([facility] ICD-10-CM: I21.4 ICD-9-CM: 410.70  10/9/2015 - 5/29/2020 Subjective: Dyke Moritz is a 70 y.o. female who presented with shortness of breath which has been progressively worsening since this weekend. She reports having by cough and sinus congestion. Symptoms acutely worsened this morning. She denies any chest pain, abdominal pain, diarrhea, or fever. She has felt chills. Upon arrival of EMS she was found to have saturations in the 70s. She has a history of chronic COPD and uses 3 L of oxygen at baseline. [Dr Tori De La Paz : Got 2 units PRBCs, but follow up labs unable to be obtained for the past 12 hours. I spoke with anesthesia and they will access her for central line. Pt tolerated PO, +BM yesterday. Says her legs are no more swollen than her baseline. 1/3: Sugar was in 50s before bed last night. Held bedtime humalog and halved lantus. Sugars better this AM.  Now in Afib with RVR. Responded only briefly to dilt push. Pt feels the tachycardia. Denies increased SOB or CP. Tolerated PO yesterday. Due for bowel prep today, but on hold until cardiovascularly more stable. Review of Systems: A comprehensive review of systems was negative except for that written in the HPI. Objective:  
Physical Exam:  
 
Visit Vitals /69 (BP 1 Location: Left arm, BP Patient Position: At rest) Pulse (!) 120 Temp 97.8 °F (36.6 °C) Resp 20 Ht 5' 7\" (1.702 m) Wt 118 kg (260 lb 1.6 oz) SpO2 96% BMI 40.74 kg/m² O2 Flow Rate (L/min): 3 l/min O2 Device: Nasal cannula Temp (24hrs), Av.9 °F (36.6 °C), Min:97.5 °F (36.4 °C), Max:98.4 °F (36.9 °C) No intake/output data recorded.  1901 -  0700 In: 3637.5 [P.O.:650; I.V.:1797.5] Out: 700 [Urine:700] General:  Sleeping, rousable, cooperative, no distress, appears stated age, moon-face. Head:  Normocephalic, without obvious abnormality, atraumatic. Eyes:  Conjunctivae/corneas clear. PERRL, EOMs intact. Nose: Nares normal. Septum midline.   
Throat: Lips, mucosa, and tongue moist.  
 Neck: Supple, symmetrical, trachea midline, no adenopathy, thyroid: no enlargement/tenderness/nodules, no carotid bruit and no JVD. Back:   Symmetric, no curvature. ROM normal. No CVA tenderness. Lungs:   Wheezing at apices, diminished at bases, mildly labored Chest wall:  No tenderness or deformity. Heart:  120s, irregularly irregular, S1, S2 normal, no murmur, click, rub or gallop. Abdomen:   Soft, non-tender. Bowel sounds normal. No masses,  No organomegaly. Extremities: Extremities normal, atraumatic, no cyanosis, 3+ pitting edema arms and legs. No calf tenderness or cords. Pulses: 2+ and symmetric all extremities. Skin: Skin color, texture, turgor normal. No rashes or lesions Neurologic: CNII-XII intact. Alert and oriented X 3. Fine motor of hands and fingers normal.   equal.  Gait not tested at this time. Sensation grossly normal to touch. Gross motor of extremities normal.    
 
Data Review:  
   
Recent Days: 
Recent Labs 01/03/21 
0159 01/02/21 
1110 01/01/21 
1530 WBC 15.4* 14.5* 15.6* HGB 7.3* 7.8* 4.9* HCT 24.5* 26.0* 17.7*  443* 505* Recent Labs 01/03/21 
0159 01/02/21 
1110 01/01/21 
1530  138 139  
K 3.8 4.7 4.6  104 104 CO2 32 33* 33* * 209* 226* BUN 21* 21* 23* CREA 1.07* 1.03* 1.12* CA 7.7* 7.8* 7.9*  
MG  --   --  2.5* PHOS 2.2*  --   --   
ALB 2.3* 2.5* 2.4* ALT 19 20 19 No results for input(s): PH, PCO2, PO2, HCO3, FIO2 in the last 72 hours. 24 Hour Results: 
Recent Results (from the past 24 hour(s)) GLUCOSE, POC Collection Time: 01/02/21  8:53 AM  
Result Value Ref Range Glucose (POC) 203 (H) 65 - 100 mg/dL Performed by Ilan Cool METABOLIC PANEL, COMPREHENSIVE Collection Time: 01/02/21 11:10 AM  
Result Value Ref Range Sodium 138 136 - 145 mmol/L Potassium 4.7 3.5 - 5.1 mmol/L  Chloride 104 97 - 108 mmol/L  
 CO2 33 (H) 21 - 32 mmol/L  
 Anion gap 1 (L) 5 - 15 mmol/L Glucose 209 (H) 65 - 100 mg/dL BUN 21 (H) 6 - 20 MG/DL Creatinine 1.03 (H) 0.55 - 1.02 MG/DL  
 BUN/Creatinine ratio 20 12 - 20 GFR est AA >60 >60 ml/min/1.73m2 GFR est non-AA 53 (L) >60 ml/min/1.73m2 Calcium 7.8 (L) 8.5 - 10.1 MG/DL Bilirubin, total 1.1 (H) 0.2 - 1.0 MG/DL  
 ALT (SGPT) 20 12 - 78 U/L  
 AST (SGOT) 20 15 - 37 U/L Alk. phosphatase 152 (H) 45 - 117 U/L Protein, total 5.4 (L) 6.4 - 8.2 g/dL Albumin 2.5 (L) 3.5 - 5.0 g/dL Globulin 2.9 2.0 - 4.0 g/dL A-G Ratio 0.9 (L) 1.1 - 2.2    
CBC W/O DIFF Collection Time: 01/02/21 11:10 AM  
Result Value Ref Range WBC 14.5 (H) 3.6 - 11.0 K/uL  
 RBC 3.07 (L) 3.80 - 5.20 M/uL HGB 7.8 (L) 11.5 - 16.0 g/dL HCT 26.0 (L) 35.0 - 47.0 % MCV 84.7 80.0 - 99.0 FL  
 MCH 25.4 (L) 26.0 - 34.0 PG  
 MCHC 30.0 30.0 - 36.5 g/dL  
 RDW 16.3 (H) 11.5 - 14.5 % PLATELET 899 (H) 933 - 400 K/uL MPV 10.6 8.9 - 12.9 FL  
 NRBC 1.9 (H) 0  WBC ABSOLUTE NRBC 0.28 (H) 0.00 - 0.01 K/uL HEMOGLOBIN A1C WITH EAG Collection Time: 01/02/21 11:10 AM  
Result Value Ref Range Hemoglobin A1c 5.9 (H) 4.0 - 5.6 % Est. average glucose 123 mg/dL SAMPLES BEING HELD Collection Time: 01/02/21 11:10 AM  
Result Value Ref Range SAMPLES BEING HELD 1BL COMMENT Add-on orders for these samples will be processed based on acceptable specimen integrity and analyte stability, which may vary by analyte. GLUCOSE, POC Collection Time: 01/02/21 11:36 AM  
Result Value Ref Range Glucose (POC) 181 (H) 65 - 100 mg/dL Performed by Jane Love (PCT) GLUCOSE, POC Collection Time: 01/02/21  4:54 PM  
Result Value Ref Range Glucose (POC) 167 (H) 65 - 100 mg/dL Performed by Vladimir Andrew, POC Collection Time: 01/02/21  9:44 PM  
Result Value Ref Range Glucose (POC) 57 (L) 65 - 100 mg/dL Performed by Josh Milner (PCT) GLUCOSE, POC  
 Collection Time: 01/02/21  9:58 PM  
Result Value Ref Range Glucose (POC) 110 (H) 65 - 100 mg/dL Performed by Jonna Blas GLUCOSE, POC Collection Time: 01/02/21 10:22 PM  
Result Value Ref Range Glucose (POC) 110 (H) 65 - 100 mg/dL Performed by Renetta Sandoval (PCT) GLUCOSE, POC Collection Time: 01/02/21 11:46 PM  
Result Value Ref Range Glucose (POC) 166 (H) 65 - 100 mg/dL Performed by Jonna Blas CBC WITH AUTOMATED DIFF Collection Time: 01/03/21  1:59 AM  
Result Value Ref Range WBC 15.4 (H) 3.6 - 11.0 K/uL  
 RBC 2.85 (L) 3.80 - 5.20 M/uL HGB 7.3 (L) 11.5 - 16.0 g/dL HCT 24.5 (L) 35.0 - 47.0 % MCV 86.0 80.0 - 99.0 FL  
 MCH 25.6 (L) 26.0 - 34.0 PG  
 MCHC 29.8 (L) 30.0 - 36.5 g/dL  
 RDW 16.7 (H) 11.5 - 14.5 % PLATELET 744 398 - 263 K/uL MPV 10.0 8.9 - 12.9 FL  
 NRBC 1.7 (H) 0  WBC ABSOLUTE NRBC 0.26 (H) 0.00 - 0.01 K/uL NEUTROPHILS 83 (H) 32 - 75 % LYMPHOCYTES 5 (L) 12 - 49 % MONOCYTES 10 5 - 13 % EOSINOPHILS 0 0 - 7 % BASOPHILS 0 0 - 1 % IMMATURE GRANULOCYTES 2 (H) 0.0 - 0.5 % ABS. NEUTROPHILS 12.8 (H) 1.8 - 8.0 K/UL  
 ABS. LYMPHOCYTES 0.8 0.8 - 3.5 K/UL  
 ABS. MONOCYTES 1.5 (H) 0.0 - 1.0 K/UL  
 ABS. EOSINOPHILS 0.0 0.0 - 0.4 K/UL  
 ABS. BASOPHILS 0.0 0.0 - 0.1 K/UL  
 ABS. IMM. GRANS. 0.3 (H) 0.00 - 0.04 K/UL  
 DF SMEAR SCANNED    
 RBC COMMENTS ANISOCYTOSIS 
1+ METABOLIC PANEL, COMPREHENSIVE Collection Time: 01/03/21  1:59 AM  
Result Value Ref Range Sodium 140 136 - 145 mmol/L Potassium 3.8 3.5 - 5.1 mmol/L Chloride 106 97 - 108 mmol/L  
 CO2 32 21 - 32 mmol/L Anion gap 2 (L) 5 - 15 mmol/L Glucose 150 (H) 65 - 100 mg/dL BUN 21 (H) 6 - 20 MG/DL Creatinine 1.07 (H) 0.55 - 1.02 MG/DL  
 BUN/Creatinine ratio 20 12 - 20 GFR est AA >60 >60 ml/min/1.73m2 GFR est non-AA 50 (L) >60 ml/min/1.73m2 Calcium 7.7 (L) 8.5 - 10.1 MG/DL  Bilirubin, total 0.6 0.2 - 1.0 MG/DL  
 ALT (SGPT) 19 12 - 78 U/L  
 AST (SGOT) 23 15 - 37 U/L Alk. phosphatase 146 (H) 45 - 117 U/L Protein, total 5.0 (L) 6.4 - 8.2 g/dL Albumin 2.3 (L) 3.5 - 5.0 g/dL Globulin 2.7 2.0 - 4.0 g/dL A-G Ratio 0.9 (L) 1.1 - 2.2 PHOSPHORUS Collection Time: 01/03/21  1:59 AM  
Result Value Ref Range Phosphorus 2.2 (L) 2.6 - 4.7 MG/DL  
GLUCOSE, POC Collection Time: 01/03/21  6:28 AM  
Result Value Ref Range Glucose (POC) 101 (H) 65 - 100 mg/dL Performed by Edelmira South Medications reviewed Current Facility-Administered Medications Medication Dose Route Frequency  dilTIAZem (CARDIZEM) injection 10 mg  10 mg IntraVENous ONCE  predniSONE (DELTASONE) tablet 20 mg  20 mg Oral BID WITH MEALS  cyanocobalamin (VITAMIN B12) injection 1,000 mcg  1,000 mcg IntraMUSCular DAILY  iron sucrose (VENOFER) injection 100 mg  100 mg IntraVENous DAILY  magnesium citrate solution 296 mL  296 mL Oral BID  
 bisacodyL (DULCOLAX) tablet 10 mg  10 mg Oral Q2H  
 0.9% sodium chloride infusion 250 mL  250 mL IntraVENous PRN  
 sodium chloride (NS) flush 5-10 mL  5-10 mL IntraVENous PRN  
 sodium chloride (NS) flush 5-40 mL  5-40 mL IntraVENous Q8H  
 sodium chloride (NS) flush 5-40 mL  5-40 mL IntraVENous PRN  
 acetaminophen (TYLENOL) tablet 650 mg  650 mg Oral Q6H PRN Or  
 acetaminophen (TYLENOL) suppository 650 mg  650 mg Rectal Q6H PRN  polyethylene glycol (MIRALAX) packet 17 g  17 g Oral DAILY PRN  promethazine (PHENERGAN) tablet 12.5 mg  12.5 mg Oral Q6H PRN Or  
 ondansetron (ZOFRAN) injection 4 mg  4 mg IntraVENous Q6H PRN  
 0.9% sodium chloride infusion  75 mL/hr IntraVENous CONTINUOUS  
 cefTRIAXone (ROCEPHIN) 1 g in sterile water (preservative free) 10 mL IV syringe  1 g IntraVENous Q24H  
 azithromycin (ZITHROMAX) tablet 500 mg  500 mg Oral DAILY  ipratropium-albuterol (COMBIVENT RESPIMAT) 20 mcg-100 mcg inhalation spray  1 Puff Inhalation Q4H RT  
  0.9% sodium chloride infusion 250 mL  250 mL IntraVENous PRN  
 glucose chewable tablet 16 g  4 Tab Oral PRN  
 dextrose (D50W) injection syrg 12.5-25 g  25-50 mL IntraVENous PRN  
 glucagon (GLUCAGEN) injection 1 mg  1 mg IntraMUSCular PRN  
 insulin glargine (LANTUS) injection 23 Units  0.2 Units/kg SubCUTAneous QHS  insulin lispro (HUMALOG) injection   SubCUTAneous QID WITH MEALS  cholecalciferol (VITAMIN D3) (1000 Units /25 mcg) tablet 2 Tab  2,000 Units Oral DAILY  cyanocobalamin (VITAMIN B12) tablet 1,000 mcg  1,000 mcg Oral DAILY  digoxin (LANOXIN) tablet 0.125 mg  0.125 mg Oral DAILY  DULoxetine (CYMBALTA) capsule 60 mg  60 mg Oral DAILY  ferrous sulfate tablet 325 mg  325 mg Oral DAILY WITH BREAKFAST  metoprolol tartrate (LOPRESSOR) tablet 25 mg  25 mg Oral DAILY PRN  
 mirtazapine (REMERON) tablet 15 mg  15 mg Oral QHS  montelukast (SINGULAIR) tablet 10 mg  10 mg Oral QPM  
 
 
Care Plan discussed with: Patient/Family and Nurse Total time spent with patient: 30 minutes.  
 
Jesus Charles MD

## 2021-01-03 NOTE — PROGRESS NOTES
2148: pt BS 57; pt given apple juice; will recheck BS; BS now 110; notifed provider with order sto hold humalog and giv only 10 units of lantus. Also informed of weeping edema in all extremities and received orders to hold fluid overnight

## 2021-01-04 NOTE — PROGRESS NOTES
Reason for admission:    
 
RUR Score/Risk Level:   41% 
  
PCP:    First and Last name:  Dr. Brittney Zelaya, but patient has been seeing the MD at [address] [place] [address] since she was admitted there after discharging from here. Name of Practice:  
            Are you a current patient: Yes/No:  
            Approximate date of last visit:  
            Can you participate in a virtual visit with your PCP:  
  
Is a Care Conference indicated:   Not at this time 
  
  
Did you attend your follow up appointment (s): If not, why not:  Yes patient followed up with MD at [address] [place] [address] 
  
    
Resources/supports as identified by patient/family:   Patient has supportive family Top Challenges facing patient (as identified by patient/family and CM):      
  
Finances/Medication cost?     Patient has medication coverage Transportation      Daughter helps as needed, [address] is helpful per patient Support system or lack thereof? Patient has supportive daughter. Living arrangements? Patient has been living at [facility] since discharging from here and plans to go back. Self-care/ADLs/Cognition? Patient is alert and oriented, has good insight, needs some help with ADLs and self-care Current Advanced Directive/Advance Care Plan:   Full code, has ACP on file Plan for utilizing home health:   None at this time, patient plans to return to [facility]. Transition of Care Plan:    Based on readmission, the patient's previous Plan of Care 
 has been evaluated and/or modified. The current Transition of Care Plan is: Patient has been residing at [facility] since her last discharge from here on 10/12. She has been getting her prescriptions through [address] [place] [address] but otherwise gets them through [employer]. Patient's emergency contact is her daughter Karena Perera, who can be reached at [phone]. Patient states that either her daughter will come and pick her up at discharge or she can call the [facility] which is how she got here in the first place. Please note that [address] [place] [address] is requiring 2 covid tests 24 hours apart, so patient will need these for discharge.  
  
Transition of Care Plan 1. Monitor patients response to treatment. 2. Patient plans to return to [address] [place] [address] 3. Daughter or shuttle will transport 4. Patient is on 3L O2 
5. Afib RVR 6. Endoscopy this am. 
7. Decreasing prednisone 20 mg 
8. IV Rocephin and azithromycin 9. Case management to follow. Care Management Interventions PCP Verified by CM: Yes Mode of Transport at Discharge: Other (see comment)(Family) Transition of Care Consult (CM Consult): Discharge Planning MyChart Signup: No 
Discharge Durable Medical Equipment: No 
Physical Therapy Consult: No 
Occupational Therapy Consult: No 
Speech Therapy Consult: No 
Current Support Network: Assisted Living Confirm Follow Up Transport: Family Discharge Location Discharge Placement: Assisted Living [facility]

## 2021-01-04 NOTE — PERIOP NOTES
Received report from Wendy Gatica CRNA. See anesthesia record. Patient taken to post-recovery. Post-recovery report given to CHANDNI Whittington RN. Patient's ABD remains soft and non-tender post procedure. Pt has no complaints at this time and tolerated the procedure well. Endoscope was pre-cleaned at bedside immediately following procedure by Monika.

## 2021-01-04 NOTE — PERIOP NOTES
TRANSFER - OUT REPORT: 
 
Verbal report given to  Radha RN(name) on Sherry Jalloh  being transferred to  Step down bed 333(unit) for routine progression of care    
 
Report consisted of patient’s Situation, Background, Assessment and  
Recommendations(SBAR).  
 
Information from the following report(s) SBAR, MAR and Recent Results was reviewed with the receiving nurse. 
 
Lines:  
Single Lumen Venous Catheter 01/02/21 Anterior;Proximal;Right Forearm (Active)  
Central Line Being Utilized Yes 01/03/21 2000  
Criteria for Appropriate Use Limited/no vessel suitable for conventional peripheral access 01/03/21 2000  
Site Assessment Clean, dry, & intact;Intact;Ecchymotic (bruised) 01/04/21 0821  
Infiltration Assessment 0 01/04/21 0821  
Affected Extremity/Extremities Pulses palpable 01/04/21 0821  
Dressing Status Clean, dry, & intact 01/04/21 0821  
Dressing Type Transparent 01/04/21 0821  
Action Taken Open ports on tubing capped 01/04/21 0821  
Hub Color/Line Status Pink 01/04/21 0821  
Positive Blood Return (Medial Site) Yes 01/03/21 2000  
Alcohol Cap Used Yes 01/04/21 0821  
   
Peripheral IV 01/01/21 Right External jugular (Active)  
Site Assessment Clean, dry, & intact 01/04/21 0821  
Phlebitis Assessment 0 01/04/21 0821  
Infiltration Assessment 0 01/04/21 0821  
Dressing Status Clean, dry, & intact 01/04/21 0821  
Dressing Type Non-adherent dressing;Transparent 01/04/21 0821  
Hub Color/Line Status Pink 01/04/21 0821  
Action Taken Open ports on tubing capped 01/04/21 0821  
Alcohol Cap Used Yes 01/04/21 0821  
  
 
Opportunity for questions and clarification was provided.   
 
Patient transported with: 
 Monitor 
O2 @ 3 liters 
Registered Nurse

## 2021-01-04 NOTE — WOUND CARE
New Consult for weeping legs and arms CHART SHOWS: 
Admitted for pneumonia History of Diabetes,CAD,AFIB HTn, 
WBC 14.4 Admitted from home ASSESSMENT: 
Patient appropriate conversation but extremely groggy. complains of pain \"all over\" assists in repositioning Pure Christoph Loach Bed foam mattress Diet diabetic All skin folds and bony prominences assessed Bilateral heels, buttocks and sacral skin intact and without erythema. Heels offloaded with pillows Palpable DP pulses bilaterally . Generalized edema and blanching erythema to lower legs and feet. weeping in lower legs bilaterally and upper arms bilaterally. Old wounds noted with purple discoloration on all 4 extremities. No open areas other then below noted wounds 1.  left elbow area=1x1x0.2cm  Partial thickness Serous sanguinous exudate. Kendal wound intact& with out erythema. Recommendations: 
Hydrocolloid dressing to left elbow wound Turn reposition approximately every 2 hours and offload heels with pillows at all times in bed. Z-guard cream to buttocks and sacrum daily and as needed with incontinence care Minimize layers of linen/-pads under patient to optimize support surface. Discussed with MADAI Bowers Transition of Care: Plan to follow weekly and  as needed while admitted to hospital.

## 2021-01-04 NOTE — ANESTHESIA POSTPROCEDURE EVALUATION
Procedure(s): ESOPHAGOGASTRODUODENOSCOPY (EGD) COLONOSCOPY 
ESOPHAGOGASTRODUODENAL (EGD) BIOPSY COLON BIOPSY ENDOSCOPIC POLYPECTOMY. MAC Anesthesia Post Evaluation Patient location during evaluation: bedside Level of consciousness: awake Pain management: satisfactory to patient Airway patency: patent Anesthetic complications: no 
Cardiovascular status: acceptable Respiratory status: acceptable Hydration status: acceptable INITIAL Post-op Vital signs:  
Vitals Value Taken Time /93 01/04/21 6434 Temp 36.6 °C (97.8 °F) 01/04/21 3861 Pulse 86 01/04/21 0959 Resp 21 01/04/21 0959 SpO2 97 % 01/04/21 0959 Vitals shown include unvalidated device data.

## 2021-01-04 NOTE — ANESTHESIA PREPROCEDURE EVALUATION
Anesthetic History No history of anesthetic complications Review of Systems / Medical History Patient summary reviewed and pertinent labs reviewed Pulmonary COPD: moderate Sleep apnea: CPAP Shortness of breath Asthma Pertinent negatives: No recent URI Comments: COPD, wears 3L NC at home 24/7 Neuro/Psych Within defined limits Cardiovascular Hypertension Dysrhythmias : atrial fibrillation Past MI (2015), CAD, PAD, cardiac stents (x2 in 2015, x1 in 2018) and hyperlipidemia Exercise tolerance: <4 METS Comments: Afib, rate controlled and   
GI/Hepatic/Renal 
  
GERD Renal disease: CRI Hiatal hernia Comments: Renal insufficiency, Cr.  Endo/Other Diabetes Morbid obesity, blood dyscrasia and anemia (Hgb 7.2) Other Findings Comments: Fibromyalgia Daily prednisone use for temporal arteritis History of recurrent DVT, thrombectomy and finger amp in April Physical Exam 
 
Airway Mallampati: III 
TM Distance: 4 - 6 cm Neck ROM: normal range of motion, short neck Mouth opening: Normal 
 
 Cardiovascular Rhythm: regular Rate: normal 
 
 
 
 Dental 
 
Dentition: Lower dentition intact, Upper dentition intact and Caps/crowns Pulmonary Breath sounds clear to auscultation Abdominal 
GI exam deferred Other Findings Anesthetic Plan ASA: 3 Anesthesia type: MAC Induction: Intravenous Anesthetic plan and risks discussed with: Patient

## 2021-01-04 NOTE — PROGRESS NOTES
Angelica Mindy 1945 
[id number] Situation: 
Verbal report received from: [facility] RN Procedure: Procedure(s): ESOPHAGOGASTRODUODENOSCOPY (EGD) COLONOSCOPY 
ESOPHAGOGASTRODUODENAL (EGD) BIOPSY COLON BIOPSY ENDOSCOPIC POLYPECTOMY Background: 
 
Preoperative diagnosis: [id number] Postoperative diagnosis: 1- Gastritis 2- duodenitis 3- esophagitis 4- Transverse polyps x2 
5- diverticulosis. :  Dr. Gopi Castillo Assistant(s): Endoscopy Technician-1: Ricky Saldana Endoscopy RN-1: Bonnie Anderson Specimens:  
ID Type Source Tests Collected by Time Destination 1 : Antrum Bioposy Preservative   Andrew Zhang MD 1/4/2021 0805 Pathology 2 : Duodenum Biopsy Preservative Duodenum  Andrew Zhang MD 1/4/2021 6049 Pathology 3 : Random Colon Biopsy Preservative   Andrew Zhang MD 1/4/2021 3179 Pathology 4 : Transverse Colon Polyp x2 Preservative Colon, Elida Bender MD 1/4/2021 2681 Pathology H. Pylori  yes Assessment: Anesthesia gave intra-procedure sedation and medications, see anesthesia flow sheet no Intravenous fluids: NS@ [facility] Vital signs stable Abdominal assessment: round and soft Recommendation: 
Discharge patient per MD order. Return to floor Family or Friend Permission to share finding with family or friend yes

## 2021-01-04 NOTE — PROGRESS NOTES
Chart and telemetry reviewed NSR w PACs EGD today - findings noted Okay to resume [facility] tomorrow per GI Continue digoxin D/c BB On dilt gtt-d/c Change dilt IR to CD

## 2021-01-04 NOTE — PROCEDURES
[address] Catracho Oakley M.D. 2021 Esophagogastroduodenoscopy (EGD) Colonoscopy Procedure Note Jesus Asencio : 1945 [address] [place] [address] Record Number: [id number] Indications:    Iron deficiency anemia, diarrhea Referring Physician:  Alejandro Lee MD 
Anesthesia/Sedation: see nursing notes Endoscopist:  Dr. Catracho aOkley Assistants: None Permit: The indications, risks, benefits and alternatives were reviewed with the patient or their decision maker who was provided an opportunity to ask questions and all questions were answered. The specific risks of esophagogastroduodenoscopy with conscious sedation were reviewed, including but not limited to anesthetic complication, bleeding, adverse drug reaction, missed lesion, infection, IV site reactions, and intestinal perforation which would lead to the need for surgical repair. Alternatives to EGD including radiographic imaging, observation without testing, or laboratory testing were reviewed as well as the limitations of those alternatives discussed. After considering the options and having all their questions answered, the patient or their decision maker provided both verbal and written consent to proceed. Procedure in Detail: After obtaining informed consent, positioning of the patient in the left lateral decubitus position, and conduction of a pre-procedure pause or \"time out\" the endoscope was introduced into the mouth and advanced to the duodenum. A careful inspection was made, and findings or interventions are described below. Findings:  
Esophagus:numerous white exudates Stomach: antral erythema with soft submucosal mass Normal body and fundus Duodenum/jejunum: erythema, erosions second into third portion Complications/estimated blood loss: none Therapies:  biopsy of stomach antrum at site submucosal mass biopsy of duodenal second portion, third portion Specimens: biopsies Implants:none Endoscopist:  Dr. Nazario Raya Assistants: None Complications:  None Estimate Blood Loss:  None Colonoscopy is to follow Permit: The indications, risks, benefits and alternatives were reviewed with the patient or their decision maker who was provided an opportunity to ask questions and all questions were answered. The specific risks of colonoscopy with conscious sedation were reviewed, including but not limited to anesthetic complication, bleeding, adverse drug reaction, missed lesion, infection, IV site reactions, and intestinal perforation which would lead to the need for surgical repair. Alternatives to colonoscopy including radiographic imaging, observation without testing, or laboratory testing were reviewed including the limitations of those alternatives. After considering the options and having all their questions answered, the patient or their decision maker provided both verbal and written consent to proceed. Procedure in Detail: After obtaining informed consent, positioning of the patient in the left lateral decubitus position, and conduction of a pre-procedure pause or \"time out\" the endoscope was introduced into the anus and advanced to the terminal ileum. The quality of the colonic preparation was adequate. A careful inspection was made as the colonoscope was withdrawn, findings and interventions are described below. Appendiceal orifice photographed Findings: - Diverticulosis Two sessile 12 mm polyps Specimens:  
 polyps removed cold snare; random biopsies cecum, ascending Implants: none Complications:  
None; patient tolerated the procedure well. Estimated blood loss: none Impression: 
Candida esophagitis, gastritis, duodenitis, colon polyps, diverticulosis. Antral mass likely a benign lipoma Recommendations: - Await pathology. - anticoagulate tomorrow Resume diet; add creon, nystatin suspension Thank you for entrusting me with this patient's care. Please do not hesitate to contact me with any questions or if I can be of assistance with any of your other patients' GI needs. Signed By: Adebayo Guardado MD 
                      January 4, 2021

## 2021-01-04 NOTE — PROGRESS NOTES
Shift Report::Bedside and Verbal shift change report given to SHAHRIAR RN  (oncoming nurse) by Balwinder Pettit  (offgoing nurse). Report included the following information SBAR, Kardex and Recent Results. .. This patient was assisted with Intentional Toileting every 2 hours during this shift. Documentation of ambulation and output reflected on Flowsheet. 0800- To ENDO dept via bed. Braeden Dumont TRANSFER - OUT REPORT: 
 
Verbal report given to Loretta RN(name) on Yina Graft  being transferred to ENDO(unit) for routine post - op Report consisted of patients Situation, Background, Assessment and  
Recommendations(SBAR). Information from the following report(s) SBAR, Kardex and Recent Results was reviewed with the receiving nurse. Lines:  
Single Lumen Venous Catheter 01/02/21 Anterior;Proximal;Right Forearm (Active) Central Line Being Utilized Yes 01/03/21 2000 Criteria for Appropriate Use Limited/no vessel suitable for conventional peripheral access 01/03/21 2000 Site Assessment Clean, dry, & intact; Intact; Ecchymotic (bruised) 01/04/21 2254 Infiltration Assessment 0 01/04/21 5635 Affected Extremity/Extremities Pulses palpable 01/04/21 9370 Dressing Status Clean, dry, & intact 01/04/21 0194 Dressing Type Transparent 01/04/21 2511 Action Taken Open ports on tubing capped 01/04/21 5737 Hub Color/Line Status Pink 01/04/21 7540 Positive Blood Return (Medial Site) Yes 01/03/21 2000 Alcohol Cap Used Yes 01/04/21 5613 Peripheral IV 01/01/21 Right External jugular (Active) Site Assessment Clean, dry, & intact 01/04/21 4989 Phlebitis Assessment 0 01/04/21 7081 Infiltration Assessment 0 01/04/21 7169 Dressing Status Clean, dry, & intact 01/04/21 9723 Dressing Type Non-adherent dressing;Transparent 01/04/21 8302 Hub Color/Line Status Pink 01/04/21 6051 Action Taken Open ports on tubing capped 01/04/21 8289 Alcohol Cap Used Yes 01/04/21 5396 Opportunity for questions and clarification was provided. Patient transported with:  BED. Niru [place] 1030- 
 
TRANSFER - IN REPORT: 
 
Verbal report received from [facility] rn(name) on Angelica Guillen  being received from ENDO(unit) for routine progression of care Report consisted of patients Situation, Background, Assessment and  
Recommendations(SBAR). Information from the following report(s) SBAR, Kardex and Recent Results was reviewed with the receiving nurse. Opportunity for questions and clarification was provided. Assessment completed upon patients arrival to unit and care assumed. .. 1900- Bedside and Verbal shift change report given to Justice Small 1721  (oncoming nurse) by Elizabeth Hogan RN  (offgoing nurse). Report included the following information SBAR, Kardex and Recent Results.

## 2021-01-04 NOTE — PROGRESS NOTES
Bedside and Verbal shift change report given to Tasneem Sandoval (oncoming nurse) by Chacha Pereyra (offgoing nurse). Report included the following information SBAR, Kardex, ED Summary, Procedure Summary, Intake/Output, MAR, Accordion, Recent Results, Med Rec Status and Cardiac Rhythm Sinus Arrythmia/Tachycardia. OVERNIGHT:  
Patient remained in Sinus Arrhythmia; vitals stable. Patient only consumed about 60 ml of mag citrate overnight. I notified  [facility] this AM. Incontinent episode of stool this AM. Stool not clear. Bathed patient and notified Danitza in Endo of patient's stool. Bedside and Verbal shift change report given to Radha (oncoming nurse) by Tasneem Sandoval (offgoing nurse). Report included the following information SBAR, Kardex, ED Summary, Intake/Output, MAR, Accordion, Recent Results, Med Rec Status and Cardiac Rhythm Sinus Arrythmia.

## 2021-01-04 NOTE — PROGRESS NOTES
Daily Progress Note: 1/4/2021 Jesus Lee MD 
 
Assessment/Plan:  
Left lower lobe pneumonia 
-Continue Rocephin and azithromycin 
-Covid negative 
-pulmonology following 
  
Acute hypoxic respiratory failure 
-Continue with supplemental oxygen 
-Patient uses 3 L of oxygen at baseline, wean to this goal as tolerated Afib with RVR, fluid overloaded 
-dilt drip changed to po per Cards 
 
-monitor mag/phos/K+ and replete PRN 
-Continue with digoxin and hold Eliquis until GI/Cards say 
-Consulted cardiology 
- lasix/diuretics per Cards 
  
Anemia - acute blood loss on chronic 
-Occult blood positive 
-Transfused 2 units PRBC so far -GI on board- endoscopies 1/4 
  
Sepsis 
-Continue treatment of above 
-Follow cultures 
  
Elevated creatinine 
-monitor, treat as needed 
  
Hypertension 
-Continue with metoprolol 
  
Type 2 diabetes: sugars soft. A1c 5.9 
-Patient takes Onglyza which we will hold 
-Continue sliding scale insulin  
-lantus halved to 10 
   
Anxiety/depression 
-Continue with Cymbalta and Remeron 
   
 
Problem List: 
Problem List as of 1/4/2021 Date Reviewed: 11/2/2020 Codes Class Noted - Resolved Left lower lobe pneumonia ICD-10-CM: J18.9 ICD-9-CM: 490  1/1/2021 - Present Leukocytosis ICD-10-CM: Q48.622 ICD-9-CM: 288.60  11/3/2020 - Present Chronic respiratory failure with hypoxia [facility]) ICD-10-CM: J96.11 
ICD-9-CM: 518.83, 799.02  11/2/2020 - Present Overview Signed 11/2/2020 10:12 PM by Homer Davidson MD  
  On 3L NC at home Cellulitis of lower extremity ICD-10-CM: L03.119 ICD-9-CM: 682.6  3/23/2020 - Present ASO (arteriosclerosis obliterans) ICD-10-CM: I70.90 ICD-9-CM: 440.9  9/5/2019 - Present PVD (peripheral vascular disease) ([facility] ICD-10-CM: I73.9 ICD-9-CM: 443.9  8/1/2019 - Present Severe obesity ([facility] ICD-10-CM: E66.01 
ICD-9-CM: 278.01  7/30/2019 - Present COPD (chronic obstructive pulmonary disease) (HCC) ICD-10-CM: J44.9 ICD-9-CM: 120  7/13/2019 - Present Normocytic anemia ICD-10-CM: D64.9 ICD-9-CM: 285.9  7/13/2019 - Present PAD (peripheral artery disease) (HCC) ICD-10-CM: I73.9 ICD-9-CM: 443.9  7/13/2019 - Present Mild intermittent asthma ICD-10-CM: J45.20 ICD-9-CM: 493.90  5/17/2019 - Present Brachial artery thrombus (HCC) ICD-10-CM: E98.7 ICD-9-CM: 444.21  4/26/2019 - Present Sleep apnea ICD-10-CM: G47.30 ICD-9-CM: 780.57  1/5/2019 - Present Overview Signed 1/5/2019  8:41 PM by Tommie Chang DO  
  wears CPAP Atrial fibrillation [facility]) ICD-10-CM: I48.91 
ICD-9-CM: 427.31  11/16/2018 - Present Obesity (BMI 30-39.9) (Chronic) ICD-10-CM: K04.2 ICD-9-CM: 278.00  11/13/2018 - Present Recurrent deep vein thrombosis (DVT) (HCC) ICD-10-CM: I82.409 ICD-9-CM: 453.40  3/30/2018 - Present Chronic anticoagulation (Chronic) ICD-10-CM: Z79.01 
ICD-9-CM: V58.61  3/30/2018 - Present Essential hypertension (Chronic) ICD-10-CM: I10 
ICD-9-CM: 401.9  1/22/2016 - Present CAD (coronary artery disease) ICD-10-CM: I25.10 ICD-9-CM: 414.00  10/9/2015 - Present Type II diabetes mellitus (HCC) (Chronic) ICD-10-CM: E11.9 ICD-9-CM: 250.00  10/9/2015 - Present RESOLVED: Syncope and collapse ICD-10-CM: R55 
ICD-9-CM: 780.2  9/29/2020 - 11/2/2020 RESOLVED: Cellulitis ICD-10-CM: L03.90 ICD-9-CM: 682.9  3/23/2020 - 5/29/2020 RESOLVED: Hypokalemia ICD-10-CM: E87.6 ICD-9-CM: 276.8  3/23/2020 - 5/29/2020 RESOLVED: Hyponatremia ICD-10-CM: E87.1 ICD-9-CM: 276.1  3/23/2020 - 5/29/2020 RESOLVED: Diarrhea ICD-10-CM: R19.7 ICD-9-CM: 787.91  3/23/2020 - 5/29/2020 RESOLVED: Syncope and collapse ICD-10-CM: R55 
ICD-9-CM: 780.2  7/13/2019 - 5/29/2020 RESOLVED: UTI (urinary tract infection) ICD-10-CM: N39.0 ICD-9-CM: 599.0  7/13/2019 - 11/2/2020 RESOLVED: Sepsis ([facility] ICD-10-CM: A41.9 ICD-9-CM: 038.9, 995.91  7/13/2019 - 11/3/2020 RESOLVED: Leg wound, left ICD-10-CM: N25.807G ICD-9-CM: 891.0  7/13/2019 - 5/29/2020 RESOLVED: Leg laceration, right, initial encounter ICD-10-CM: L83.966E ICD-9-CM: 894.0  7/13/2019 - 5/29/2020 RESOLVED: Cellulitis of right upper arm ICD-10-CM: L03.113 ICD-9-CM: 682.3  5/17/2019 - 5/29/2020 RESOLVED: Gangrene of finger of right hand ([facility].) ICD-10-CM: R32 
ICD-9-CM: 785.4  5/17/2019 - 11/2/2020 RESOLVED: Bowel obstruction ([facility] ICD-10-CM: T79.567 ICD-9-CM: 560.9  1/8/2019 - 5/29/2020 RESOLVED: Partial small bowel obstruction ([facility] ICD-10-CM: K56.600 ICD-9-CM: 560.9  1/5/2019 - 5/29/2020 RESOLVED: Dyspnea ICD-10-CM: R06.00 
ICD-9-CM: 786.09  11/13/2018 - 5/29/2020 RESOLVED: ARF (acute renal failure) (HCC) ICD-10-CM: N17.9 ICD-9-CM: 584.9  11/13/2018 - 5/29/2020 RESOLVED: Closed wedge compression fracture of T7 vertebra ([facility] ICD-10-CM: J75.691L ICD-9-CM: 805.2  11/13/2018 - 5/29/2020 RESOLVED: Type 2 diabetes with nephropathy ([facility] ICD-10-CM: E11.21 
ICD-9-CM: 250.40, 583.81  10/1/2018 - 11/2/2020 RESOLVED: Chest pain ICD-10-CM: R07.9 ICD-9-CM: 786.50  6/27/2018 - 5/29/2020 RESOLVED: Abdominal pain ICD-10-CM: R10.9 ICD-9-CM: 789.00  6/27/2018 - 5/29/2020 RESOLVED: Coronary artery disease involving native coronary artery without angina pectoris (Chronic) ICD-10-CM: I25.10 ICD-9-CM: 414.01  1/22/2016 - 11/2/2020 RESOLVED: HTN (hypertension) ICD-10-CM: I10 
ICD-9-CM: 401.9  10/9/2015 - 1/22/2016 RESOLVED: NSTEMI (non-ST elevated myocardial infarction) ([facility].) ICD-10-CM: I21.4 ICD-9-CM: 410.70  10/9/2015 - 5/29/2020 Subjective: Ricky Leavitt is a 70 y.o. female who presented with shortness of breath which has been progressively worsening since this weekend. She reports having by cough and sinus congestion. Symptoms acutely worsened this morning. She denies any chest pain, abdominal pain, diarrhea, or fever. She has felt chills. Upon arrival of EMS she was found to have saturations in the 70s. She has a history of chronic COPD and uses 3 L of oxygen at baseline. [Dr Daniella Mora : Got 2 units PRBCs, but follow up labs unable to be obtained for the past 12 hours. I spoke with anesthesia and they will access her for central line. Pt tolerated PO, +BM yesterday. Says her legs are no more swollen than her baseline. 1/3: Sugar was in 50s before bed last night. Held bedtime humalog and halved lantus. Sugars better this AM.  Now in Afib with RVR. Responded only briefly to dilt push. Pt feels the tachycardia. Denies increased SOB or CP. Tolerated PO yesterday. Due for bowel prep today, but on hold until cardiovascularly more stable. :  She reports she feels better this AM.  Currently in sinus rhythm at this moment. She is going to endoscopy this AM.  Hb up to 8.6. WBC 14.4. Will decrease Prednisone to 20mg per day. Review of Systems: A comprehensive review of systems was negative except for that written in the HPI. Objective:  
Physical Exam:  
 
Visit Vitals BP (!) 141/59 Pulse 93 Temp 98.3 °F (36.8 °C) Resp 24 Ht 5' 7\" (1.702 m) Wt 118 kg (260 lb 1.6 oz) SpO2 98% BMI 40.74 kg/m² O2 Flow Rate (L/min): 3 l/min O2 Device: Nasal cannula Temp (24hrs), Av.8 °F (34.3 °C), Min:37.4 °F (3 °C), Max:98.6 °F (37 °C) No intake/output data recorded.  1901 -  0700 In: 2201.6 [P.O.:1290; I.V.:601.6] Out: 3300 [MKWYE:[id number]] General:  Alert, cooperative, no distress, appears stated age, moon-face. Head:  Normocephalic, without obvious abnormality, atraumatic. Eyes:  Conjunctivae/corneas clear. EOMs intact. Throat: Lips, mucosa, and tongue moist.  
Neck: Supple, symmetrical, trachea midline, no adenopathy, thyroid: no enlargement/tenderness/nodules, no carotid bruit and no JVD. Back:   Symmetric, no curvature. ROM normal. No CVA tenderness. Lungs:   Wheezing at apices, diminished at bases, mildly labored Chest wall:  No tenderness or deformity. Heart:  120s, irregularly irregular, S1, S2 normal, no murmur, click, rub or gallop. Abdomen:   Soft, non-tender. Bowel sounds normal. No masses,  No organomegaly. Extremities: Extremities normal, atraumatic, no cyanosis, 3+ pitting edema arms and legs. No calf tenderness or cords. Pulses: 2+ and symmetric all extremities. Skin: Skin color, texture, turgor normal. No rashes or lesions Neurologic: CNII-XII intact. Alert and oriented X 3. Fine motor of hands and fingers normal.   equal.  Gait not tested at this time. Sensation grossly normal to touch. Gross motor of extremities normal.    
 
Data Review:  
   
Recent Days: 
Recent Labs 01/04/21 
0344 01/03/21 
0159 01/02/21 
1110 WBC 14.4* 15.4* 14.5* HGB 8.6* 7.3* 7.8* HCT 28.7* 24.5* 26.0*  
 400 443* Recent Labs 01/04/21 
0130 01/03/21 
0159 01/02/21 
1110 01/01/21 
1530  140 138 139  
K 3.7 3.8 4.7 4.6  106 104 104 CO2 34* 32 33* 33* * 150* 209* 226* BUN 17 21* 21* 23* CREA 1.11* 1.07* 1.03* 1.12* CA 7.9* 7.7* 7.8* 7.9*  
MG  --  2.4  --  2.5* PHOS  --  2.2*  --   --   
ALB 2.3* 2.3* 2.5* 2.4* ALT 24 19 20 19 No results for input(s): PH, PCO2, PO2, HCO3, FIO2 in the last 72 hours. 24 Hour Results: 
Recent Results (from the past 24 hour(s)) GLUCOSE, POC Collection Time: 01/03/21 12:12 PM  
Result Value Ref Range Glucose (POC) 147 (H) 65 - 100 mg/dL Performed by Deirdre Cabello (PCT) RBC, ALLOCATE Collection Time: 01/03/21  2:45 PM  
Result Value Ref Range HISTORY CHECKED? Historical check performed GLUCOSE, POC Collection Time: 01/03/21  4:44 PM  
Result Value Ref Range Glucose (POC) 145 (H) 65 - 100 mg/dL Performed by Nieves Levin, POC Collection Time: 01/03/21  9:24 PM  
Result Value Ref Range Glucose (POC) 129 (H) 65 - 100 mg/dL Performed by Mairlia Double METABOLIC PANEL, COMPREHENSIVE Collection Time: 01/04/21  1:30 AM  
Result Value Ref Range Sodium 141 136 - 145 mmol/L Potassium 3.7 3.5 - 5.1 mmol/L Chloride 108 97 - 108 mmol/L  
 CO2 34 (H) 21 - 32 mmol/L Anion gap NEG 1 5 - 15 mmol/L Glucose 130 (H) 65 - 100 mg/dL BUN 17 6 - 20 MG/DL Creatinine 1.11 (H) 0.55 - 1.02 MG/DL  
 BUN/Creatinine ratio 15 12 - 20 GFR est AA 58 (L) >60 ml/min/1.73m2 GFR est non-AA 48 (L) >60 ml/min/1.73m2 Calcium 7.9 (L) 8.5 - 10.1 MG/DL Bilirubin, total 0.5 0.2 - 1.0 MG/DL  
 ALT (SGPT) 24 12 - 78 U/L  
 AST (SGOT) 27 15 - 37 U/L Alk. phosphatase 160 (H) 45 - 117 U/L Protein, total 5.1 (L) 6.4 - 8.2 g/dL Albumin 2.3 (L) 3.5 - 5.0 g/dL Globulin 2.8 2.0 - 4.0 g/dL A-G Ratio 0.8 (L) 1.1 - 2.2    
CBC WITH AUTOMATED DIFF Collection Time: 01/04/21  3:44 AM  
Result Value Ref Range WBC 14.4 (H) 3.6 - 11.0 K/uL  
 RBC 3.25 (L) 3.80 - 5.20 M/uL HGB 8.6 (L) 11.5 - 16.0 g/dL HCT 28.7 (L) 35.0 - 47.0 % MCV 88.3 80.0 - 99.0 FL  
 MCH 26.5 26.0 - 34.0 PG  
 MCHC 30.0 30.0 - 36.5 g/dL  
 RDW 17.2 (H) 11.5 - 14.5 % PLATELET 342 209 - 106 K/uL MPV 10.1 8.9 - 12.9 FL  
 NRBC 1.1 (H) 0  WBC ABSOLUTE NRBC 0.16 (H) 0.00 - 0.01 K/uL NEUTROPHILS 77 (H) 32 - 75 % BAND NEUTROPHILS 2 0 - 6 % LYMPHOCYTES 10 (L) 12 - 49 % MONOCYTES 10 5 - 13 % EOSINOPHILS 0 0 - 7 % BASOPHILS 0 0 - 1 % METAMYELOCYTES 1 (H) 0 % IMMATURE GRANULOCYTES 0 %  
 ABS. NEUTROPHILS 11.4 (H) 1.8 - 8.0 K/UL  
 ABS. LYMPHOCYTES 1.4 0.8 - 3.5 K/UL  
 ABS. MONOCYTES 1.4 (H) 0.0 - 1.0 K/UL ABS. EOSINOPHILS 0.0 0.0 - 0.4 K/UL  
 ABS. BASOPHILS 0.0 0.0 - 0.1 K/UL  
 ABS. IMM. GRANS. 0.0 K/UL  
 DF MANUAL    
 RBC COMMENTS ANISOCYTOSIS 1+ 
    
 RBC COMMENTS POLYCHROMASIA PRESENT 
    
 RBC COMMENTS TEARDROP CELLS 
PRESENT 
    
 WBC COMMENTS TOXIC GRANULATION Medications reviewed Current Facility-Administered Medications Medication Dose Route Frequency  phosphorus (K PHOS NEUTRAL) 250 mg tablet 1 Tab  1 Tab Oral BID  dilTIAZem (CARDIZEM) 100 mg in 0.9% sodium chloride (MBP/ADV) 100 mL infusion  0-10 mg/hr IntraVENous TITRATE  pantoprazole (PROTONIX) tablet 40 mg  40 mg Oral ACB  
 0.9% sodium chloride infusion 250 mL  250 mL IntraVENous PRN  
 dilTIAZem IR (CARDIZEM) tablet 30 mg  30 mg Oral Q6H  
 predniSONE (DELTASONE) tablet 20 mg  20 mg Oral BID WITH MEALS  cyanocobalamin (VITAMIN B12) injection 1,000 mcg  1,000 mcg IntraMUSCular DAILY  iron sucrose (VENOFER) injection 100 mg  100 mg IntraVENous DAILY  magnesium citrate solution 296 mL  296 mL Oral BID  
 0.9% sodium chloride infusion 250 mL  250 mL IntraVENous PRN  
 sodium chloride (NS) flush 5-10 mL  5-10 mL IntraVENous PRN  
 sodium chloride (NS) flush 5-40 mL  5-40 mL IntraVENous Q8H  
 sodium chloride (NS) flush 5-40 mL  5-40 mL IntraVENous PRN  
 acetaminophen (TYLENOL) tablet 650 mg  650 mg Oral Q6H PRN Or  
 acetaminophen (TYLENOL) suppository 650 mg  650 mg Rectal Q6H PRN  polyethylene glycol (MIRALAX) packet 17 g  17 g Oral DAILY PRN  promethazine (PHENERGAN) tablet 12.5 mg  12.5 mg Oral Q6H PRN Or  
 ondansetron (ZOFRAN) injection 4 mg  4 mg IntraVENous Q6H PRN  
 0.9% sodium chloride infusion  75 mL/hr IntraVENous CONTINUOUS  
 cefTRIAXone (ROCEPHIN) 1 g in sterile water (preservative free) 10 mL IV syringe  1 g IntraVENous Q24H  
 azithromycin (ZITHROMAX) tablet 500 mg  500 mg Oral DAILY  ipratropium-albuterol (COMBIVENT RESPIMAT) 20 mcg-100 mcg inhalation spray  1 Puff Inhalation Q4H RT  
 0.9% sodium chloride infusion 250 mL  250 mL IntraVENous PRN  
 glucose chewable tablet 16 g  4 Tab Oral PRN  
 dextrose (D50W) injection syrg 12.5-25 g  25-50 mL IntraVENous PRN  
 glucagon (GLUCAGEN) injection 1 mg  1 mg IntraMUSCular PRN  
 insulin glargine (LANTUS) injection 23 Units  0.2 Units/kg SubCUTAneous QHS  insulin lispro (HUMALOG) injection   SubCUTAneous QID WITH MEALS  cholecalciferol (VITAMIN D3) (1000 Units /25 mcg) tablet 2 Tab  2,000 Units Oral DAILY  cyanocobalamin (VITAMIN B12) tablet 1,000 mcg  1,000 mcg Oral DAILY  digoxin (LANOXIN) tablet 0.125 mg  0.125 mg Oral DAILY  DULoxetine (CYMBALTA) capsule 60 mg  60 mg Oral DAILY  metoprolol tartrate (LOPRESSOR) tablet 25 mg  25 mg Oral DAILY PRN  
 mirtazapine (REMERON) tablet 15 mg  15 mg Oral QHS  montelukast (SINGULAIR) tablet 10 mg  10 mg Oral QPM  
 
 
Care Plan discussed with: Patient, GI and Nurse Total time spent with patient: 30 minutes.  
Lencho Culp MD

## 2021-01-04 NOTE — PERIOP NOTES
Jos Edward 1945 
[id number] Situation: 
 
Scheduled Procedure: Procedure(s): ESOPHAGOGASTRODUODENOSCOPY (EGD) COLONOSCOPY Verbal report received from: Navya Pelaez RN 
Preoperative diagnosis: anemia Background: 
 
Procedure: Procedure(s): ESOPHAGOGASTRODUODENOSCOPY (EGD) COLONOSCOPY Physician performing procedure; Dr. Camacho Cardenas SBAR QUESTIONS FLOOR TO ENDO RN 
 
NPO Status/Last PO Intake: midnight Pregnancy Test:Not applicable If yes, result: none Is the patient taking Blood Thinners: YES If yes, list: Eliquis currently on hold due to anemia/GI bleeding Is the patient diabetic:yes If yes, what was the last BS:  130  Time taken? 5270  Anything given? yes (Lantus) Does the patient have a Pacemaker/Defibrillator in place?: no  
Does the patient need antibiotics before/during/after procedure: no If the patient is having a colon, How much prep was drank? Approximately 1/4 but patient has been having watery stools for several days What were the Colon prep results? watery Does the patient have SCD in place:no Is patient on CONTACT precautions:yes (contact) If yes, what kind of CONTACT precautions: MRSA, ESBL Assessment: 
Are the vital signs stable prior to patient coming to ENDO?  yes Is the patient alert/oriented and able to sign consent for the procedures:yes Does the patient have a patient IV in place? yes Recommendation: 
Family or Friend present no Permission to share finding with Family or Friend n/a Currently in NSR with Cardizem drip infusing (PO Cardizem has been administered as well).

## 2021-01-04 NOTE — PROGRESS NOTES
[facility] Progress Note Name: Cintia Vickers MRN: [id number] : 1945 Hospital: [address] [facility] Date: 2021 IMPRESSION:  
· SOB:  ? Related to anemia vs asthma exac. Feels better today after treatment · Asthma/COPD with acute exacerbation:  ? ACOS. On chronic 20 mg prednisone at home. · Chronic hypoxic resp failure · Severe anemia · HAYES · Morbid obesity · Chronic prednisone use RECOMMENDATIONS:  
· Continue prednisone 20 mg daily - home dose · Cont w BD therapy · Nystatin per GI · Stop abx · F/u path · IV iron per GI · Resuming [address] tomorrow per GI 
· Pt appears to only see us rarely in office. Would benefit w better compliance and also should be seen in our sleep office. Will help arrange prior to d/c. Patient is stable from a pulmonary standpoint. We will sign off and arrange for outpatient pulmonary follow up as above. Please call with questions. Subjective: This patient has been seen and evaluated at the request of Dr. Elma Vizcaino for asthma exacerbation. Patient is a 70 y.o. female who has come intermittently to our office over the yrs who presents w sob. Pt states that she had the abrupt onset of sob yesterday. + cough w scant phlegm and some chest pain. Felt cold but no fevers. Did not feel wheezy. Pt on 20 mg pred for the past few yrs daily and on home oxygen. Pt's CXR with silhouetting of L HD and cardiac border. This is a chronic finding. HGB noted to be 4.9. Pt treated w transfusion, nebs, steroids, and abx and is feeling better. Interval history Afebrile BP soft but stable Sats 99% on 3L WBC 14.4 Hgb 8.6 - better Blood cx no growth x 3 days Pna work up in process S/p EGD with white exudate in the esophagus, antral mass (suspected benign lipoma), and duodenum/jejunum erythema/erosions; colonoscopy with diverticulosis and two sessile 12 mm polyps ROS: Feeling better. Denies SOB at rest on baseline O2. Eating and drinking fine after endoscopy. Denies CP. Denies fever, chills, or cough. Denies abd pain or LE pain/swelling. Past Medical History:  
Diagnosis Date  Asthma  Atrial fibrillation ([facility] 11/16/2018  Autoimmune disease ([facility]   
 fibromyalgia  CAD (coronary artery disease) 10/9/2015  Closed wedge compression fracture of T7 vertebra ([facility] 11/13/2018  COPD (chronic obstructive pulmonary disease) (HCC)  Diabetes ([facility]  DVT (deep vein thrombosis) in pregnancy  GERD (gastroesophageal reflux disease)  Heart failure ([facility]  History of vascular access device 09/30/2020  
 4f midline, 12cm at 1cm out placed in L Cephalic by Samira Nixon RN  
 HTN (hypertension)  NSTEMI (non-ST elevated myocardial infarction) ([facility] 10/9/2015  Obesity (BMI 30-39.9) 11/13/2018  PAD (peripheral artery disease) ([facility] prior stenting  Sleep apnea   
 wears CPAP  
 Statin intolerance Past Surgical History:  
Procedure Laterality Date  COLONOSCOPY N/A 1/4/2021 COLONOSCOPY performed by Cha Washburn MD at [address] [facility]  HX COLOSTOMY    
 and reversal, post episiotomy repair [address]  HX UROLOGICAL  2009  
 bladder sling  IR KYPHOPLASTY LUMBAR  10/8/2020  [place] ABDOMEN SURGERY PROC UNLISTED    
 hital hernia repair, gall bladder removed  [place] AMPUTATION TRANSMETACARPAL Right 06/12/2019  
 entire ring finger, 2nd & 3rd fingers (transmetacarpal)  [place] BREAST SURGERY PROCEDURE UNLISTED    
 lumpectomy  [place] CARDIAC SURG PROCEDURE UNLIST  10/9/15  
 2 stents Prior to Admission medications Medication Sig Start Date End Date Taking? Authorizing Provider  
aspirin 81 mg chewable tablet Take 81 mg by mouth daily. Yes Provider, Historical  
apixaban (ELIQUIS) 5 mg tablet Take 5 mg by mouth two (2) times a day.     Provider, Historical  
 alendronate (FOSAMAX) 70 mg tablet Take 70 mg by mouth every thirty (30) days. On the 1st day of each month    Provider, Historical  
zinc oxide 20 % ointment Apply  to affected area three (3) times daily. Bilateral gluteal and perianal area each shift    Provider, Historical  
loperamide (IMODIUM) 2 mg capsule Take 4 mg by mouth two (2) times a day. Provider, Historical  
honey (MediHoney, honey,) 100 % topical paste Apply  to affected area daily. Provider, Historical  
cyanocobalamin 1,000 mcg tablet Take 1 Tab by mouth daily. 10/13/20   Bubba Apley, MD  
digoxin (LANOXIN) 0.125 mg tablet Take 1 Tab by mouth daily. 10/13/20   Bubba Apley, MD  
ferrous sulfate 325 mg (65 mg iron) tablet Take 1 Tab by mouth daily (with breakfast). 10/13/20   Bubba Apley, MD  
tiotropium bromide (Spiriva Respimat) 2.5 mcg/actuation inhaler Take 2 Puffs by inhalation daily. Provider, Historical  
montelukast (Singulair) 10 mg tablet Take 10 mg by mouth every evening. Provider, Historical  
sAXagliptin (ONGLYZA) 5 mg tab tablet Take 5 mg by mouth Daily (before dinner). Provider, Historical  
metoprolol tartrate (LOPRESSOR) 25 mg tablet Take 25 mg by mouth daily as needed (For systolic >102). Provider, Historical  
acetaminophen (TYLENOL) 325 mg tablet Take 1 Tab by mouth every four (4) hours as needed for Pain. 3/31/20   Bubba Apley, MD  
magnesium oxide (MAG-OX) 400 mg tablet Take 400 mg by mouth nightly. Provider, Historical  
potassium chloride SR (KLOR-CON 10) 10 mEq tablet Take 10 mEq by mouth two (2) times a day. Provider, Historical  
predniSONE (DELTASONE) 10 mg tablet Take 10 mg by mouth two (2) times daily (with meals). Provider, Historical  
Omeprazole delayed release (PRILOSEC D/R) 20 mg tablet Take 20 mg by mouth two (2) times a day. Provider, Historical  
mirtazapine (REMERON) 15 mg tablet Take 15 mg by mouth nightly.     Provider, Historical  
 albuterol (PROVENTIL VENTOLIN) 2.5 mg /3 mL (0.083 %) nebulizer solution 2.5 mg by Nebulization route every six (6) hours as needed for Wheezing or Shortness of Breath. Provider, Historical  
albuterol (PROAIR HFA) 90 mcg/actuation inhaler Take 2 Puffs by inhalation every four (4) hours as needed. Provider, Historical  
lactobacillus rhamnosus gg 10 billion cell (CULTURELLE) 10 billion cell capsule Take 1 Cap by mouth daily. Provider, Historical  
biotin 10,000 mcg cap Take 1 Cap by mouth daily. Provider, Historical  
DULoxetine (CYMBALTA) 60 mg capsule Take 60 mg by mouth daily. Provider, Historical  
cholecalciferol, vitamin D3, (Vitamin D3) 50 mcg (2,000 unit) tab Take 2,000 Units by mouth daily. Provider, Historical  
azelastine-fluticasone (DYMISTA) 137-50 mcg/spray spry 2 Sprays by Nasal route daily as needed (allergies). Provider, Historical  
mometasone-formoterol (DULERA) 200-5 mcg/actuation HFA inhaler Take 2 Puffs by inhalation two (2) times daily as needed. Provider, Historical  
 
Allergies Allergen Reactions  Advair Diskus [Fluticasone Propion-Salmeterol] Rash  Aspartame Other (comments)  Breo Ellipta [Fluticasone Furoate-Vilanterol] Rash  Bumex [Bumetanide] Myalgia  Ciprofloxacin Rash  Statins-Hmg-Coa Reductase Inhibitors Other (comments) Muscle pain Lipitor/crestor/zocor  Sulfa (Sulfonamide Antibiotics) Rash  Tetracycline Other (comments) musclepain  Torsemide Rash and Myalgia  Zetia [Ezetimibe] Myalgia Social History Tobacco Use  Smoking status: Former Smoker Quit date: 3/30/2017 Years since quitting: 3.7  Smokeless tobacco: Never Used Substance Use Topics  Alcohol use: No  
  Alcohol/week: 0.0 standard drinks Comment: very very rarely Family History Problem Relation Age of Onset  Diabetes Mother  Heart Failure Mother  Kidney Disease Mother  Diabetes Father  Heart Disease Father  Elevated Lipids Father  Heart Failure Father  Heart Disease Brother  Cancer Brother   
     kidney  Diabetes Brother  No Known Problems Brother  No Known Problems Brother Current Facility-Administered Medications Medication Dose Route Frequency  0.9% sodium chloride infusion  50 mL/hr IntraVENous CONTINUOUS  
 nystatin (MYCOSTATIN) 100,000 unit/mL oral suspension 500,000 Units  500,000 Units Oral QID  lipase-protease-amylase (CREON 24,000) capsule 1 Cap  1 Cap Oral TID WITH MEALS  dilTIAZem ER (CARDIZEM CD) capsule 120 mg  120 mg Oral DAILY  [START ON 1/5/2021] apixaban (ELIQUIS) tablet 5 mg  5 mg Oral BID  predniSONE (DELTASONE) tablet 20 mg  20 mg Oral DAILY WITH BREAKFAST  phosphorus (K PHOS NEUTRAL) 250 mg tablet 1 Tab  1 Tab Oral BID  pantoprazole (PROTONIX) tablet 40 mg  40 mg Oral ACB  cyanocobalamin (VITAMIN B12) injection 1,000 mcg  1,000 mcg IntraMUSCular DAILY  iron sucrose (VENOFER) injection 100 mg  100 mg IntraVENous DAILY  sodium chloride (NS) flush 5-40 mL  5-40 mL IntraVENous Q8H  
 0.9% sodium chloride infusion  75 mL/hr IntraVENous CONTINUOUS  
 cefTRIAXone (ROCEPHIN) 1 g in sterile water (preservative free) 10 mL IV syringe  1 g IntraVENous Q24H  
 azithromycin (ZITHROMAX) tablet 500 mg  500 mg Oral DAILY  ipratropium-albuterol (COMBIVENT RESPIMAT) 20 mcg-100 mcg inhalation spray  1 Puff Inhalation Q4H RT  
 insulin glargine (LANTUS) injection 23 Units  0.2 Units/kg SubCUTAneous QHS  insulin lispro (HUMALOG) injection   SubCUTAneous QID WITH MEALS  cholecalciferol (VITAMIN D3) (1000 Units /25 mcg) tablet 2 Tab  2,000 Units Oral DAILY  cyanocobalamin (VITAMIN B12) tablet 1,000 mcg  1,000 mcg Oral DAILY  digoxin (LANOXIN) tablet 0.125 mg  0.125 mg Oral DAILY  DULoxetine (CYMBALTA) capsule 60 mg  60 mg Oral DAILY  mirtazapine (REMERON) tablet 15 mg  15 mg Oral QHS  montelukast (SINGULAIR) tablet 10 mg  10 mg Oral QPM  
 
 
Review of Systems: A comprehensive review of systems was negative except for that written in the HPI. Objective:  
Vital Signs:   
Visit Vitals /73 (BP 1 Location: Left arm, BP Patient Position: At rest) Pulse 87 Temp 97 °F (36.1 °C) Resp 19 Ht 5' 7\" (1.702 m) Wt 118 kg (260 lb 2.3 oz) SpO2 99% BMI 40.74 kg/m² O2 Device: Nasal cannula O2 Flow Rate (L/min): 3 l/min Temp (24hrs), Av.1 °F (34.5 °C), Min:37.4 °F (3 °C), Max:98.6 °F (37 °C) Intake/Output:  
Last shift:       07 - 1900 In: 150 [I.V.:150] Out: - Last 3 shifts: 1901 -  0700 In: 2550.1 [P.O.:1410; I.V.:830.1] Out: Aleda Gowers [[id number]] Intake/Output Summary (Last 24 hours) at 2021 1158 Last data filed at 2021 1283 Gross per 24 hour Intake 1698.83 ml Output 2000 ml Net -301.17 ml Physical Exam:  
General:  Alert, cooperative Head:  Normocephalic, without obvious abnormality, atraumatic. Eyes:  Conjunctivae/corneas clear. EOMs intact. Nose: Nares normal.   
Throat: Lips, mucosa, and tongue normal.  
Neck: Supple, symmetrical, trachea midline Back:   Symmetric, no curvature. ROM normal.  
Lungs:   Decreased bs, no dinorah wheeze Chest wall:  No tenderness or deformity. Heart:  Regular rate and rhythm, S1, S2 normal, no murmur, click, rub or gallop. Abdomen:   Soft, non-tender. Obese Extremities: Chronic venous stasis w 2+ edema. Missing digits on R hand Pulses: 2+ and symmetric all extremities. Skin: Skin color, texture, turgor normal. No rashes or lesions Lymph nodes: Cervical, supraclavicular nodes normal.  
Neurologic: Grossly nonfocal  
 
Data review:  
 
Recent Results (from the past 24 hour(s)) GLUCOSE, POC Collection Time: 21 12:12 PM  
Result Value Ref Range Glucose (POC) 147 (H) 65 - 100 mg/dL Performed by Spenser Vuong (PCT) RBC, ALLOCATE  
 Collection Time: 01/03/21  2:45 PM  
Result Value Ref Range HISTORY CHECKED? Historical check performed GLUCOSE, POC Collection Time: 01/03/21  4:44 PM  
Result Value Ref Range Glucose (POC) 145 (H) 65 - 100 mg/dL Performed by Mila Dean, POC Collection Time: 01/03/21  9:24 PM  
Result Value Ref Range Glucose (POC) 129 (H) 65 - 100 mg/dL Performed by Carmita [facility] METABOLIC PANEL, COMPREHENSIVE Collection Time: 01/04/21  1:30 AM  
Result Value Ref Range Sodium 141 136 - 145 mmol/L Potassium 3.7 3.5 - 5.1 mmol/L Chloride 108 97 - 108 mmol/L  
 CO2 34 (H) 21 - 32 mmol/L Anion gap NEG 1 5 - 15 mmol/L Glucose 130 (H) 65 - 100 mg/dL BUN 17 6 - 20 MG/DL Creatinine 1.11 (H) 0.55 - 1.02 MG/DL  
 BUN/Creatinine ratio 15 12 - 20 GFR est AA 58 (L) >60 ml/min/1.73m2 GFR est non-AA 48 (L) >60 ml/min/1.73m2 Calcium 7.9 (L) 8.5 - 10.1 MG/DL Bilirubin, total 0.5 0.2 - 1.0 MG/DL  
 ALT (SGPT) 24 12 - 78 U/L  
 AST (SGOT) 27 15 - 37 U/L Alk. phosphatase 160 (H) 45 - 117 U/L Protein, total 5.1 (L) 6.4 - 8.2 g/dL Albumin 2.3 (L) 3.5 - 5.0 g/dL Globulin 2.8 2.0 - 4.0 g/dL A-G Ratio 0.8 (L) 1.1 - 2.2 SAMPLES BEING HELD Collection Time: 01/04/21  1:40 AM  
Result Value Ref Range SAMPLES BEING HELD 1RD RESP TUBE COMMENT Add-on orders for these samples will be processed based on acceptable specimen integrity and analyte stability, which may vary by analyte. CBC WITH AUTOMATED DIFF Collection Time: 01/04/21  3:44 AM  
Result Value Ref Range WBC 14.4 (H) 3.6 - 11.0 K/uL  
 RBC 3.25 (L) 3.80 - 5.20 M/uL HGB 8.6 (L) 11.5 - 16.0 g/dL HCT 28.7 (L) 35.0 - 47.0 % MCV 88.3 80.0 - 99.0 FL  
 MCH 26.5 26.0 - 34.0 PG  
 MCHC 30.0 30.0 - 36.5 g/dL  
 RDW 17.2 (H) 11.5 - 14.5 % PLATELET 157 474 - 635 K/uL MPV 10.1 8.9 - 12.9 FL  
 NRBC 1.1 (H) 0  WBC ABSOLUTE NRBC 0.16 (H) 0.00 - 0.01 K/uL NEUTROPHILS 77 (H) 32 - 75 % BAND NEUTROPHILS 2 0 - 6 % LYMPHOCYTES 10 (L) 12 - 49 % MONOCYTES 10 5 - 13 % EOSINOPHILS 0 0 - 7 % BASOPHILS 0 0 - 1 % METAMYELOCYTES 1 (H) 0 % IMMATURE GRANULOCYTES 0 %  
 ABS. NEUTROPHILS 11.4 (H) 1.8 - 8.0 K/UL  
 ABS. LYMPHOCYTES 1.4 0.8 - 3.5 K/UL  
 ABS. MONOCYTES 1.4 (H) 0.0 - 1.0 K/UL  
 ABS. EOSINOPHILS 0.0 0.0 - 0.4 K/UL  
 ABS. BASOPHILS 0.0 0.0 - 0.1 K/UL  
 ABS. IMM. GRANS. 0.0 K/UL  
 DF MANUAL    
 RBC COMMENTS ANISOCYTOSIS 1+ 
    
 RBC COMMENTS POLYCHROMASIA PRESENT 
    
 RBC COMMENTS TEARDROP CELLS 
PRESENT 
    
 WBC COMMENTS TOXIC GRANULATION    
GLUCOSE, POC Collection Time: 01/04/21  7:48 AM  
Result Value Ref Range Glucose (POC) 99 65 - 100 mg/dL Performed by Rajat Nicole   
[facility] 19, TISSUE Collection Time: 01/04/21  9:06 AM  
Result Value Ref Range H. pylori from tissue Negative Negative Positive control Positive Negative control Negative Lot no. [id number] GLUCOSE, POC Collection Time: 01/04/21 11:25 AM  
Result Value Ref Range Glucose (POC) 72 65 - 100 mg/dL Performed by Rajat Nicole Imaging: 
I have personally reviewed the patients radiographs and have reviewed the reports: As above. No new images this morning.   
 
  
Raimundo Bautista

## 2021-01-04 NOTE — PROGRESS NOTES
Physical Therapy orders acknowledged, chart reviewed and discussed with nurse patient off the floor to endo. We will continue to follow up with the patient for therapy thank you.

## 2021-01-04 NOTE — PERIOP NOTES
The patient transported to endo lab with RN and Surg Tech with heart monitor on and Cardizem drip infusing at 5ml/hour per orders.

## 2021-01-05 NOTE — PROGRESS NOTES
Comprehensive Nutrition Assessment Type and Reason for Visit: Initial 
 
Nutrition Recommendations/Plan: 1. Continue with Consistent CHO diet order. Nutrition Assessment:    
1/5: 69 yo female admitted for pneumonia. PMhx: CAD, CHF, HTN, home O2, DM. Class II obese per BMI of 39. Weight hx in EMR indicates weight has been stable for last 6 months. Spoke with pt who confirmed stable weight, denying any recent weight loss. Consistent CHO diet ordered. PO intakes documented as 75%. Pt states she has a good appetite here and while at home. S/P colonoscopy/EGD secondary to blood loss - found to have esophagitis, gastritis, duodenitis, and diverticulosis. Labs: POC -, HgbA1c 5.9. Meds: Vit D3 and B12, Lantus, Humalog, Creon, Remeron, kphos, Prednisone. Malnutrition Assessment: 
Does not meet criteria at this time. Estimated Daily Nutrient Needs: 
Energy (kcal): 1943(REE 1687 x AF 1.3 - 250); Weight Used for Energy Requirements: Current Protein (g): (0.8-1 gm/kg/d); Weight Used for Protein Requirements: Current Fluid (ml/day): 1943; Method Used for Fluid Requirements: 1 ml/kcal 
 
 
Nutrition Related Findings:  Loose BM 1/5: Edema: anasacra, weeping BUE's, 3+ pitting BLE's Wounds:   
Partial thickness(elbow) Current Nutrition Therapies: DIET DIABETIC CONSISTENT CARB Regular Anthropometric Measures: 
· Height:  5' 7\" (170.2 cm) · Current Body Wt:  115 kg (253 lb 8.5 oz) · Admission Body Wt:      
· Usual Body Wt:       
· Ideal Body Wt:   :     
· Adjusted Body Weight:   ; Weight Adjustment for: No adjustment · Adjusted BMI:      
· BMI Category:  Obese class 2 (BMI 35.0-39. 9) Nutrition Diagnosis: · No nutrition diagnosis at this time related to   as evidenced by   
 
 
Nutrition Interventions:  
Food and/or Nutrient Delivery: Continue current diet Nutrition Education and Counseling: No recommendations at this time Coordination of Nutrition Care: Continue to monitor while inpatient Goals: 
Consume > 75% meals within next 5-7 days Nutrition Monitoring and Evaluation:  
Behavioral-Environmental Outcomes:   
Food/Nutrient Intake Outcomes: Food and nutrient intake Physical Signs/Symptoms Outcomes: Biochemical data, GI status, Weight Discharge Planning:   
Continue current diet Electronically signed by Belia Ganser, RD on 1/5/2021 Contact: [phone]

## 2021-01-05 NOTE — PROGRESS NOTES
Transition Plan of Care RUR 40% Plan: 1. Monitor patient's response to treatment. 2. Patient plans to return to [address] [place] [address]. 1st covid done on 1/1. A 2nd Covid is needed for placement. 3. Daughter or shuttle to transport. 4. Continues to feel better but still very fatigued. 5.Remains in sinus rhythm at this moment. 6. Upper and lower endoscopies as under Data Review. 7. Eliquis resumed. 8. Case Management to follow.  
 
 
Rowe Fabry, BS

## 2021-01-05 NOTE — PROGRESS NOTES
Aung Espionza. Ced Sullivan, [address] [place] [address] 
[phone] office 
[phone] [facility] Gastroenterology Progress Note January 5, 2021 Admit Date: 1/1/2021 Narrative Assessment and Plan · Weakness · LLL pneumonia · Anemia - EGD and colonoscopy done yesterday - biopsies pending. No bleeding, tolerating regular diet. No new recommendations. Subjective:  
· I'm weak Location:  All over Duration: days Timing: constant Radiation: none Associated Symptoms: no bleeding Aggravating/Alleviating factors:  
 
ROS:  The previous review of systems on initial consultation / H&P is noted and reviewed. Specific changes noted above in HPI. Current Medications:  
 
Current Facility-Administered Medications Medication Dose Route Frequency  dilTIAZem ER (CARDIZEM CD) capsule 180 mg  180 mg Oral DAILY  nystatin (MYCOSTATIN) 100,000 unit/mL oral suspension 500,000 Units  500,000 Units Oral QID  lipase-protease-amylase (CREON 24,000) capsule 1 Cap  1 Cap Oral TID WITH MEALS  
 apixaban (ELIQUIS) tablet 5 mg  5 mg Oral BID  predniSONE (DELTASONE) tablet 20 mg  20 mg Oral DAILY WITH BREAKFAST  phosphorus (K PHOS NEUTRAL) 250 mg tablet 1 Tab  1 Tab Oral BID  pantoprazole (PROTONIX) tablet 40 mg  40 mg Oral ACB  
 0.9% sodium chloride infusion 250 mL  250 mL IntraVENous PRN  
 0.9% sodium chloride infusion 250 mL  250 mL IntraVENous PRN  
 sodium chloride (NS) flush 5-10 mL  5-10 mL IntraVENous PRN  
 sodium chloride (NS) flush 5-40 mL  5-40 mL IntraVENous Q8H  
 sodium chloride (NS) flush 5-40 mL  5-40 mL IntraVENous PRN  
 acetaminophen (TYLENOL) tablet 650 mg  650 mg Oral Q6H PRN Or  
 acetaminophen (TYLENOL) suppository 650 mg  650 mg Rectal Q6H PRN  polyethylene glycol (MIRALAX) packet 17 g  17 g Oral DAILY PRN  promethazine (PHENERGAN) tablet 12.5 mg  12.5 mg Oral Q6H PRN  Or  
 ondansetron (ZOFRAN) injection 4 mg  4 mg IntraVENous Q6H PRN  
  ipratropium-albuterol (COMBIVENT RESPIMAT) 20 mcg-100 mcg inhalation spray  1 Puff Inhalation Q4H RT  
 0.9% sodium chloride infusion 250 mL  250 mL IntraVENous PRN  
 glucose chewable tablet 16 g  4 Tab Oral PRN  
 dextrose (D50W) injection syrg 12.5-25 g  25-50 mL IntraVENous PRN  
 glucagon (GLUCAGEN) injection 1 mg  1 mg IntraMUSCular PRN  
 insulin glargine (LANTUS) injection 23 Units  0.2 Units/kg SubCUTAneous QHS  insulin lispro (HUMALOG) injection   SubCUTAneous QID WITH MEALS  cholecalciferol (VITAMIN D3) (1000 Units /25 mcg) tablet 2 Tab  2,000 Units Oral DAILY  cyanocobalamin (VITAMIN B12) tablet 1,000 mcg  1,000 mcg Oral DAILY  DULoxetine (CYMBALTA) capsule 60 mg  60 mg Oral DAILY  metoprolol tartrate (LOPRESSOR) tablet 25 mg  25 mg Oral DAILY PRN  
 mirtazapine (REMERON) tablet 15 mg  15 mg Oral QHS  montelukast (SINGULAIR) tablet 10 mg  10 mg Oral QPM  
 
 
Objective: VITALS:  
Last 24hrs VS reviewed since prior progress note. Most recent are: 
Visit Vitals BP (!) 128/48 (BP 1 Location: Left arm, BP Patient Position: At rest) Pulse 84 Temp 97.8 °F (36.6 °C) Resp 14 Ht 5' 7\" (1.702 m) Wt 115 kg (253 lb 9.6 oz) SpO2 99% BMI 39.72 kg/m² Temp (24hrs), Av.9 °F (36.6 °C), Min:97.5 °F (36.4 °C), Max:98.5 °F (36.9 °C) Intake/Output Summary (Last 24 hours) at 2021 1740 Last data filed at 2021 2732 Gross per 24 hour Intake 3172.75 ml Output 500 ml Net 2672.75 ml EXAM: 
General:  Comfortable, no distress   
 
Derm:   No rash, jaundice, nor palpable dermatologic mass Lab Data Reviewed:  
Recent Labs 21 
5959 21 
0344 21 
0159 WBC 14.4* 14.4* 15.4* HGB 8.5* 8.6* 7.3* HCT 29.7* 28.7* 24.5*  
 352 400 Recent Labs 21 
0550 21 
0130 21 
0159  141 140  
K 3.4* 3.7 3.8  108 106 CO2 34* 34* 32 * 130* 150* BUN 17 17 21* CREA 1.16* 1.11* 1.07* CA 7.6* 7.9* 7.7* MG  --   --  2.4 PHOS  --   --  2.2* ALB 2.4* 2.3* 2.3* TBILI 0.6 0.5 0.6 ALT 23 24 19 Lab Results Component Value Date/Time Glucose (POC) 138 (H) 01/05/2021 04:03 PM  
 Glucose (POC) 157 (H) 01/05/2021 12:40 PM  
 Glucose (POC) 82 01/05/2021 10:07 AM  
 Glucose (POC) 52 (L) 01/05/2021 09:46 AM  
 Glucose (POC) 58 (L) 01/05/2021 09:16 AM  
 
No results for input(s): PH, PCO2, PO2, HCO3, FIO2 in the last 72 hours. No results for input(s): INR, INREXT in the last 72 hours. Assessment: (See above) Active Problems: 
  Left lower lobe pneumonia (1/1/2021) Plan: (See above) Signed By: Dayana Arias MD   
 1/5/2021  5:40 PM

## 2021-01-05 NOTE — PROGRESS NOTES
Bedside and Verbal shift change report given to Vern Terry (oncoming nurse) by Darshana Jimenez (offgoing nurse). Report included the following information SBAR, Kardex, ED Summary, Intake/Output, MAR, Accordion, Recent Results, Med Rec Status and Cardiac Rhythm NSR.

## 2021-01-05 NOTE — PROGRESS NOTES
Bedside and Verbal shift change report given to Vito (oncoming nurse) by Navi Carlson (offgoing nurse). Report included the following information SBAR, MAR, Accordion and Med Rec Status. This patient was assisted with Intentional Toileting every 2 hours during this shift. Documentation of ambulation and output reflected on Flowsheet. Patient Vitals for the past 12 hrs: 
 Temp Pulse Resp BP SpO2  
01/05/21 0700  73     
01/05/21 0600  81 19 (!) 131/51 100 % 01/05/21 0400 97.6 °F (36.4 °C) 87 24 (!) 135/53 100 % 01/05/21 0200  97 22 (!) 137/51 100 % 01/05/21 0000 98.5 °F (36.9 °C) 91 (!) 31  100 % 01/04/21 2334  [address] [facility] Intake/Output Summary (Last 24 hours) at 1/5/2021 9015 Last data filed at 1/5/2021 0400 Gross per 24 hour Intake 3532.75 ml Output 600 ml Net 2932.75 ml Bedside and Verbal shift change report given to Josh Sheets (oncoming nurse) by Edilberto Howell (offgoing nurse). Report included the following information SBAR, Kardex, Procedure Summary, Intake/Output, MAR and Recent Results. This patient was assisted with Intentional Toileting every 2 hours during this shift. Documentation of ambulation and output reflected on Flowsheet. Patient Vitals for the past 12 hrs: 
 Temp Pulse Resp BP SpO2  
01/05/21 1800  (!) 106 24 (!) 118/55 98 % 01/05/21 1638     99 % 01/05/21 1600 97.8 °F (36.6 °C) 84 14 (!) 128/48 99 % 01/05/21 1500  78     
01/05/21 1230 97.5 °F (36.4 °C) (!) 114 20 (!) 138/59   
01/05/21 1222     100 % 01/05/21 1200  98 20 (!) 138/59 98 % 01/05/21 1000  (!) 113 19 137/61 99 % 01/05/21 0951  (!) 126  139/73 100 % 01/05/21 0918 97.6 °F (36.4 °C) 92 16 (!) 124/31 98 % 01/05/21 0849     98 % 01/05/21 0800  86 24 (!) 125/56 99 % Intake/Output Summary (Last 24 hours) at 1/5/2021 1914 Last data filed at 1/5/2021 2494 Gross per 24 hour Intake 1200 ml Output 1200 ml Net 0 ml

## 2021-01-05 NOTE — PROGRESS NOTES
Problem: Mobility Impaired (Adult and Pediatric) Goal: *Acute Goals and Plan of Care (Insert Text) Description: FUNCTIONAL STATUS PRIOR TO ADMISSION: Patient was modified independent using a rolling walker for functional mobility. HOME SUPPORT PRIOR TO ADMISSION: The patient lived with daughter and grand-daughter but did not require assist. 
 
Physical Therapy Goals Initiated 1/5/2021 1. Patient will move from supine to sit and sit to supine  in bed with moderate assistance  within 7 day(s). 2.  Patient will transfer from bed to chair and chair to bed with moderate assistance  using the least restrictive device within 7 day(s). 3.  Patient will perform sit to stand with moderate assistance  within 7 day(s). 4.  Patient will ambulate with moderate assistance  for 50 feet with the least restrictive device within 7 day(s). Outcome: Progressing Towards Goal 
Note: PHYSICAL THERAPY EVALUATION Patient: Semaj Mora (40 y.o. female) Date: 1/5/2021 Primary Diagnosis: Left lower lobe pneumonia [J18.9] Procedure(s) (LRB): ESOPHAGOGASTRODUODENOSCOPY (EGD) (N/A) COLONOSCOPY (N/A) ESOPHAGOGASTRODUODENAL (EGD) BIOPSY (N/A) COLON BIOPSY (N/A) ENDOSCOPIC POLYPECTOMY (N/A) 1 Day Post-Op Precautions:   Fall, WBAT 
 
 
ASSESSMENT Based on the objective data described below, the patient presents with decreased activity tolerance, strength and balance following admission for PNA. Patient underwent EGD yesterday and s/p 2 units of blood (1/2). Patient today limited by increased dizziness, able to perform rolling for repositioning and attempted chair position of bed with head of bed elevation, vitals stable, but patient reports increased dizziness and declined out of bed activity. She  has not been out of bed since admission, slow to progress and decreased initiative for movement. Patient has assistance at home from daughter and grand-daughter with return to home. Current Level of Function Impacting Discharge (mobility/balance): MOD A for rolling, unable to tolerate head of bed elevated Functional Outcome Measure: The patient scored Total: 15/100 on the Barthel Index which is indicative of 85% impaired ability to care for basic self needs/dependency on others. Other factors to consider for discharge:  
  
Patient will benefit from skilled therapy intervention to address the above noted impairments. PLAN : 
Recommendations and Planned Interventions: bed mobility training, transfer training, gait training, therapeutic exercises, and therapeutic activities Frequency/Duration: Patient will be followed by physical therapy:  5 times a week to address goals. Recommendation for discharge: (in order for the patient to meet his/her long term goals) To be determined: This discharge recommendation: A follow-up discussion with the attending provider and/or case management is planned IF patient discharges home will need the following DME: to be determined (TBD) SUBJECTIVE:  
Patient stated . OBJECTIVE DATA SUMMARY:  
HISTORY:   
Past Medical History:  
Diagnosis Date Asthma Atrial fibrillation ([address] 11/16/2018 Autoimmune disease ([address]   
 fibromyalgia CAD (coronary artery disease) 10/9/2015 Closed wedge compression fracture of T7 vertebra ([address] 11/13/2018 COPD (chronic obstructive pulmonary disease) ([address] Diabetes ([address] DVT (deep vein thrombosis) in pregnancy GERD (gastroesophageal reflux disease) Heart failure ([address] History of vascular access device 09/30/2020  
 4f midline, 12cm at 1cm out placed in L Cephalic by Bard Hope, RN  
 HTN (hypertension) NSTEMI (non-ST elevated myocardial infarction) ([address] 10/9/2015 Obesity (BMI 30-39.9) 11/13/2018 PAD (peripheral artery disease) ([address] prior stenting Sleep apnea   
 wears CPAP Statin intolerance Past Surgical History: Procedure Laterality Date COLONOSCOPY N/A 2021 COLONOSCOPY performed by Bryanna Schaffer MD at [facility] HX COLOSTOMY    
 and reversal, post episiotomy repair [facility] UROLOGICAL  2009  
 bladder sling IR KYPHOPLASTY LUMBAR  10/8/2020 [facility] ABDOMEN SURGERY PROC UNLISTED    
 hital hernia repair, gall bladder removed [facility] AMPUTATION TRANSMETACARPAL Right 2019  
 entire ring finger, 2nd & 3rd fingers (transmetacarpal) UT BREAST SURGERY PROCEDURE UNLISTED    
 lumpectomy [facility] CARDIAC SURG PROCEDURE UNLIST  10/9/15  
 2 stents Personal factors and/or comorbidities impacting plan of care: see above Home Situation Home Environment: Private residence # Steps to Enter: 0 Wheelchair Ramp: No 
One/Two Story Residence: One story Living Alone: Yes Support Systems: Family member(s), Friends \ neighbors(grand-daughter helps during The , daughter on weekends) Patient Expects to be Discharged to[de-identified] Private residence Current DME Used/Available at Home: Ottonielerd Overbrook, rollator, Walker, rolling EXAMINATION/PRESENTATION/DECISION MAKING:  
Critical Behavior: 
Neurologic State: Alert Orientation Level: Oriented X4 Cognition: Follows commands Range Of Motion: 
AROM: Generally decreased, functional 
  
  
  
PROM: Generally decreased, functional 
  
  
  
Strength:   
Strength: Grossly decreased, non-functional 
  
  
  
  
  
  
Tone & Sensation:  
  
  
  
  
  
  
  
  
  
   
Coordination: 
Coordination: Grossly decreased, non-functional 
Vision:  
  
Functional Mobility: 
Bed Mobility: 
Rolling: Maximum assistance Functional Measure: 
Barthel Index: 
 
Bathin Bladder: 5 Bowels: 5 Groomin Dressin Feedin Mobility: 0 Stairs: 0 Toilet Use: 0 Transfer (Bed to Chair and Back): 0 Total: 15/100 The Barthel ADL Index: Guidelines 1. The index should be used as a record of what a patient does, not as a record of what a patient could do. 2. The main aim is to establish degree of independence from any help, physical or verbal, however minor and for whatever reason. 3. The need for supervision renders the patient not independent. 4. A patient's performance should be established using the best available evidence. Asking the patient, friends/relatives and nurses are the usual sources, but direct observation and common sense are also important. However direct testing is not needed. 5. Usually the patient's performance over the preceding 24-48 hours is important, but occasionally longer periods will be relevant. 6. Middle categories imply that the patient supplies over 50 per cent of the effort. 7. Use of aids to be independent is allowed. Bertina Aschoff., Barthel, D.W. (7461). Functional evaluation: the Barthel Index. [facility] (14)2. Bulmaro Pepe belen DAMI Riley, Keri Valle., Sonma Murdock., [place], [address] [place] [address] (1999). Measuring the change indisability after inpatient rehabilitation; comparison of the responsiveness of the Barthel Index and Functional Martin Measure. Journal of Neurology, Neurosurgery, and Psychiatry, 66(4), 855-481. Vikki Clarke, N.J.A, SANIYA Parry, & Hina Baugh, M.A. (2004.) Assessment of post-stroke quality of life in cost-effectiveness studies: The usefulness of the Barthel Index and the EuroQoL-5D. [facility], 45, 445-53 Physical Therapy Evaluation Charge Determination History Examination Presentation Decision-Making HIGH Complexity :3+ comorbidities / personal factors will impact the outcome/ POC  MEDIUM Complexity : 3 Standardized tests and measures addressing body structure, function, activity limitation and / or participation in recreation  MEDIUM Complexity : Evolving with changing characteristics  Other outcome measures barthel index  HIGH   
  
 Based on the above components, the patient evaluation is determined to be of the following complexity level: MEDIUM Pain Rating: 
none Activity Tolerance:  
Fair and Poor After treatment patient left in no apparent distress:  
Supine in bed, Call bell within reach, and Bed / chair alarm activated COMMUNICATION/EDUCATION:  
The patients plan of care was discussed with: Registered nurse. Fall prevention education was provided and the patient/caregiver indicated understanding., Patient/family have participated as able in goal setting and plan of care. , and Patient/family agree to work toward stated goals and plan of care. Thank you for this referral. 
Esa Farr, PT, DPT Time Calculation: 16 mins

## 2021-01-05 NOTE — PROGRESS NOTES
Cardiology Progress Note [address] [facility]. [address], [place], [address] [facility] Phone [phone]; Fax [phone] 
 
 
 
2021 8:40 AM  
 
Admit Date:           2021 Admit Diagnosis:  Left lower lobe pneumonia [J18.9] :          1945 MRN:          [id number] Impression Plan/Recommendation 1. PAF 2. Hypertension 3. LLL PNA 4. Diverticulosis · In NSR, increase diltizem to 180 cd. · D/c digoxin · eliquis resumed No further cardiac recommendations Stable CV wise Future Appointments Date Time Provider Kamala Holder 2021 10:00 AM Anamika Foster MD [facility] AMB Pt personally seen and examined. Chart reviewed. Agree with advanced NP's history, exam and  A/P with changes/additons. CVS-S1-S2 present,     2/6 systolic murmur present RS-   CTAB Abdomen-  soft/NT In SR - on Eliquis Will see prn; plz call if needed Reid Diaz MD, [facility] No intake/output data recorded. Last 3 Recorded Weights in this Encounter 21 1345 21 0818 21 0400 Weight: 260 lb 1.6 oz (118 kg) 260 lb 2.3 oz (118 kg) 253 lb 9.6 oz (115 kg)  1901 -  0700 In: 4011.3 [P.O.:1520; I.V.:2491.3] Out: 850 [Urine:850] SUBJECTIVE Lemmie Nim denies palpitations, irregular heart beat, SOB, chest pain Current Facility-Administered Medications Medication Dose Route Frequency  dilTIAZem ER (CARDIZEM CD) capsule 180 mg  180 mg Oral DAILY  nystatin (MYCOSTATIN) 100,000 unit/mL oral suspension 500,000 Units  500,000 Units Oral QID  lipase-protease-amylase (CREON 24,000) capsule 1 Cap  1 Cap Oral TID WITH MEALS  
 apixaban (ELIQUIS) tablet 5 mg  5 mg Oral BID  
  predniSONE (DELTASONE) tablet 20 mg  20 mg Oral DAILY WITH BREAKFAST  phosphorus (K PHOS NEUTRAL) 250 mg tablet 1 Tab  1 Tab Oral BID  pantoprazole (PROTONIX) tablet 40 mg  40 mg Oral ACB  
 0.9% sodium chloride infusion 250 mL  250 mL IntraVENous PRN  
 0.9% sodium chloride infusion 250 mL  250 mL IntraVENous PRN  
 sodium chloride (NS) flush 5-10 mL  5-10 mL IntraVENous PRN  
 sodium chloride (NS) flush 5-40 mL  5-40 mL IntraVENous Q8H  
 sodium chloride (NS) flush 5-40 mL  5-40 mL IntraVENous PRN  
 acetaminophen (TYLENOL) tablet 650 mg  650 mg Oral Q6H PRN Or  
 acetaminophen (TYLENOL) suppository 650 mg  650 mg Rectal Q6H PRN  polyethylene glycol (MIRALAX) packet 17 g  17 g Oral DAILY PRN  promethazine (PHENERGAN) tablet 12.5 mg  12.5 mg Oral Q6H PRN Or  
 ondansetron (ZOFRAN) injection 4 mg  4 mg IntraVENous Q6H PRN  
 0.9% sodium chloride infusion  75 mL/hr IntraVENous CONTINUOUS  
 ipratropium-albuterol (COMBIVENT RESPIMAT) 20 mcg-100 mcg inhalation spray  1 Puff Inhalation Q4H RT  
 0.9% sodium chloride infusion 250 mL  250 mL IntraVENous PRN  
 glucose chewable tablet 16 g  4 Tab Oral PRN  
 dextrose (D50W) injection syrg 12.5-25 g  25-50 mL IntraVENous PRN  
 glucagon (GLUCAGEN) injection 1 mg  1 mg IntraMUSCular PRN  
 insulin glargine (LANTUS) injection 23 Units  0.2 Units/kg SubCUTAneous QHS  insulin lispro (HUMALOG) injection   SubCUTAneous QID WITH MEALS  cholecalciferol (VITAMIN D3) (1000 Units /25 mcg) tablet 2 Tab  2,000 Units Oral DAILY  cyanocobalamin (VITAMIN B12) tablet 1,000 mcg  1,000 mcg Oral DAILY  DULoxetine (CYMBALTA) capsule 60 mg  60 mg Oral DAILY  metoprolol tartrate (LOPRESSOR) tablet 25 mg  25 mg Oral DAILY PRN  
 mirtazapine (REMERON) tablet 15 mg  15 mg Oral QHS  montelukast (SINGULAIR) tablet 10 mg  10 mg Oral QPM  
  
OBJECTIVE Intake/Output Summary (Last 24 hours) at 1/5/2021 0840 Last data filed at 1/5/2021 0400 Gross per 24 hour Intake 3532.75 ml Output 500 ml Net 3032.75 ml Review of Systems - History obtained from the patient AS PER  HPI Telemetry NSr pacs PHYSICAL EXAM  
  
 
Visit Vitals BP (!) 131/51 Pulse 73 Temp 97.6 °F (36.4 °C) Resp 19 Ht 5' 7\" (1.702 m) Wt 253 lb 9.6 oz (115 kg) SpO2 100% BMI 39.72 kg/m² Gen: Well-developed, obese , in no acute distress  alert and oriented x 3 HEENT:  Pink conjunctivae, Hearing grossly normal.No scleral icterus or conjunctival, moist mucous membranes Neck: Supple,cannot appreciate JVD d/t neck size Resp: No accessory muscle use, Clear breath sounds, No rales or rhonchi 
Card: Regular Rate,Rythm,2/6 murmurs, no rubs or gallop. No thrills. GI:          soft, non-tender MSK: No cyanosis or clubbing Skin: No rashes or ulcers. Right upper arm ecchymosis - large amount. Fingers warm and pink- unable to palpate pulse/ L radial pulse +2. Right fingers amputated Neuro:  Cranial nerves are grossly intact, moving all four extremities, no focal deficit, follows commands appropriately Psych:  Good insight, oriented to person, place and time, alert, Nml Affect LE: +2 ayna edema, red ble DATA REVIEW Laboratory and Imaging have been reviewed by me and are notable for No results for input(s): CPK, CKMB, TROIQ in the last 72 hours. Recent Labs 01/05/21 
0550 01/04/21 
0344 01/04/21 
0130 01/03/21 
0159 01/02/21 
1110 NA  --   --  141 140 138 K  --   --  3.7 3.8 4.7 CO2  --   --  34* 32 33* BUN  --   --  17 21* 21* CREA  --   --  1.11* 1.07* 1.03* GLU  --   --  130* 150* 209* PHOS  --   --   --  2.2*  --   
MG  --   --   --  2.4  --   
WBC 14.4* 14.4*  --  15.4* 14.5* HGB 8.5* 8.6*  --  7.3* 7.8* HCT 29.7* 28.7*  --  24.5* 26.0*  
 352  --  400 443* Castillo Roberson, NP

## 2021-01-06 NOTE — PROGRESS NOTES
Cardiology Progress Note [address] [facility] Phone [phone]; Fax [phone] 
 
 
 
2021 8:40 AM  
 
Admit Date:           2021 Admit Diagnosis:  Left lower lobe pneumonia [J18.9] :          1945 MRN:          [id number] Impression Plan/Recommendation 1. PAF 2. Hypertension 3. LLL PNA 4. Diverticulosis 5. hypokalemia · In NSR w frequent PACs, increase diltizem to 240 cd (to cover BP). · D/c digoxin · eliquis resumed- hgb stable · Add on mag/replete k 80 meq No further cardiac recommendations Stable CV wise Will see prn D/w patient and attending Future Appointments Date Time Provider Kamala Glory 2021 10:00 AM Ysabel Foster MD [facility] AMB Pt personally seen and examined. Chart reviewed. Agree with advanced NP's history, exam and  A/P with changes/additons. Visit Vitals BP (!) 124/53 (BP 1 Location: Left arm, BP Patient Position: At rest) Pulse 97 Temp 98.2 °F (36.8 °C) Resp 27 Ht 5' 7\" (1.702 m) Wt 254 lb 1.6 oz (115.3 kg) SpO2 97% BMI 39.80 kg/m² CVS-S1-S2 present, no murmur PAF - in SR/On Eliquis Reid Lau MD, [facility] [place] No intake/output data recorded. Last 3 Recorded Weights in this Encounter 21 0818 21 0400 21 0315 Weight: 260 lb 2.3 oz (118 kg) 253 lb 9.6 oz (115 kg) 254 lb 1.6 oz (115.3 kg)  190 -  0700 In: 1300 [P.O.:1300] Out:  [CBXBH:3986] SUBJECTIVE Oletha Pleas denies palpitations, irregular heart beat, SOB, chest pain Current Facility-Administered Medications Medication Dose Route Frequency  potassium chloride SR (KLOR-CON 10) tablet 10 mEq  10 mEq Oral BID  
  dilTIAZem ER (CARDIZEM CD) capsule 180 mg  180 mg Oral DAILY  nystatin (MYCOSTATIN) 100,000 unit/mL oral suspension 500,000 Units  500,000 Units Oral QID  lipase-protease-amylase (CREON 24,000) capsule 1 Cap  1 Cap Oral TID WITH MEALS  
 apixaban (ELIQUIS) tablet 5 mg  5 mg Oral BID  predniSONE (DELTASONE) tablet 20 mg  20 mg Oral DAILY WITH BREAKFAST  phosphorus (K PHOS NEUTRAL) 250 mg tablet 1 Tab  1 Tab Oral BID  pantoprazole (PROTONIX) tablet 40 mg  40 mg Oral ACB  
 0.9% sodium chloride infusion 250 mL  250 mL IntraVENous PRN  
 0.9% sodium chloride infusion 250 mL  250 mL IntraVENous PRN  
 sodium chloride (NS) flush 5-10 mL  5-10 mL IntraVENous PRN  
 sodium chloride (NS) flush 5-40 mL  5-40 mL IntraVENous Q8H  
 sodium chloride (NS) flush 5-40 mL  5-40 mL IntraVENous PRN  
 acetaminophen (TYLENOL) tablet 650 mg  650 mg Oral Q6H PRN Or  
 acetaminophen (TYLENOL) suppository 650 mg  650 mg Rectal Q6H PRN  polyethylene glycol (MIRALAX) packet 17 g  17 g Oral DAILY PRN  promethazine (PHENERGAN) tablet 12.5 mg  12.5 mg Oral Q6H PRN Or  
 ondansetron (ZOFRAN) injection 4 mg  4 mg IntraVENous Q6H PRN  
 ipratropium-albuterol (COMBIVENT RESPIMAT) 20 mcg-100 mcg inhalation spray  1 Puff Inhalation Q4H RT  
 0.9% sodium chloride infusion 250 mL  250 mL IntraVENous PRN  
 glucose chewable tablet 16 g  4 Tab Oral PRN  
 dextrose (D50W) injection syrg 12.5-25 g  25-50 mL IntraVENous PRN  
 glucagon (GLUCAGEN) injection 1 mg  1 mg IntraMUSCular PRN  
 insulin glargine (LANTUS) injection 23 Units  0.2 Units/kg SubCUTAneous QHS  insulin lispro (HUMALOG) injection   SubCUTAneous QID WITH MEALS  cholecalciferol (VITAMIN D3) (1000 Units /25 mcg) tablet 2 Tab  2,000 Units Oral DAILY  cyanocobalamin (VITAMIN B12) tablet 1,000 mcg  1,000 mcg Oral DAILY  DULoxetine (CYMBALTA) capsule 60 mg  60 mg Oral DAILY  metoprolol tartrate (LOPRESSOR) tablet 25 mg  25 mg Oral DAILY PRN  
 mirtazapine (REMERON) tablet 15 mg  15 mg Oral QHS  montelukast (SINGULAIR) tablet 10 mg  10 mg Oral QPM  
  
OBJECTIVE Intake/Output Summary (Last 24 hours) at 1/6/2021 8238 Last data filed at 1/6/2021 4148 Gross per 24 hour Intake 580 ml Output 1500 ml Net -920 ml Review of Systems - History obtained from the patient AS PER  HPI Telemetry NSr freq pacs PHYSICAL EXAM  
  
 
Visit Vitals BP (!) 146/57 (BP 1 Location: Left arm) Pulse 99 Temp 98.4 °F (36.9 °C) Resp 23 Ht 5' 7\" (1.702 m) Wt 254 lb 1.6 oz (115.3 kg) SpO2 100% BMI 39.80 kg/m² Gen: Well-developed, obese , in no acute distress  alert and oriented x 3 HEENT:  Pink conjunctivae, Hearing grossly normal.No scleral icterus or conjunctival, moist mucous membranes Neck: Supple,cannot appreciate JVD d/t neck size Resp: No accessory muscle use, diminished bases Card: Regular Rate,Rythm,2/6 murmurs, no rubs or gallop. No thrills. GI:          soft, non-tender MSK: No cyanosis or clubbing Skin: No rashes or ulcers. Right upper arm ecchymosis - large amount. Fingers warm and pink- unable to palpate pulse/ L radial pulse +2. Right fingers amputated Neuro:  Cranial nerves are grossly intact, moving all four extremities, no focal deficit, follows commands appropriately Psych:  Good insight, oriented to person, place and time, alert, Nml Affect LE: +2 yana edema, red ble DATA REVIEW Laboratory and Imaging have been reviewed by me and are notable for No results for input(s): CPK, CKMB, TROIQ in the last 72 hours. Recent Labs 01/06/21 
1668 01/05/21 
0550 01/04/21 
0344 01/04/21 
0130  142  --  141  
K 3.4* 3.4*  --  3.7 CO2 36* 34*  --  34* BUN 18 17  --  17  
CREA 0.98 1.16*  --  1.11* GLU 81 110*  --  130* WBC 14.3* 14.4* 14.4*  --   
HGB 8.5* 8.5* 8.6*  --   
HCT 29.4* 29.7* 28.7*  --   
  791 352  -- Dina Tinajero NP

## 2021-01-06 NOTE — ROUTINE PROCESS
Bedside and Verbal shift change report given to Janette Slater (oncoming nurse) by Herbert Wynn (offgoing nurse). Report included the following information SBAR, Kardex, ED Summary, Procedure Summary, Intake/Output, MAR, Recent Results and Cardiac Rhythm NSR.

## 2021-01-06 NOTE — PROGRESS NOTES
[place] Guido. Abilio Andres, [address] [place] [address] 
[phone] office 
[phone] voicemail Gastroenterology Progress Note January 6, 2021 Admit Date: 1/1/2021 Narrative Assessment and Plan · Diarrhea - I've had diarrhea for years. No BM yesterday. One liquid BM this AM.  Random colon biopsies and duodenal biopsies are normal - no colitis, no celiac disease. She reports imodium twice daily leads to acceptable bowel pattern. Will restart that. Had considered cholestyramine for functional diarrhea but would require separation from all other medications which would be difficult in her polypharmaceutical state so will not do that. · Colon polyps - tubular adenomas. Next colonoscopy 3 years · Gastric lesion endoscopic diagnosis lipoma, biopsies negative. No further testing planned. · Anticoagulation and recent biopsies and polypectomy - no bleeding and hgb stable · Anemia - hgb stable, normocytic normochromic but iron indices low 9/2020. B12 low 9/2020. Would recommend both iron and b12 supplementation. The iron may worsen diarrhea or it may make it better, we'll have to see. From GI perspective no indication for ongoing hospitalization. When her other medical problems allow, discharge is appropriate. I will sign off for now, call if needed. Subjective: · I had a loose BM this morning Location:  No vomiting or nausea Duration: no bleeding Timing: denies abd pain Radiation: reports diarrhea for years, imodium twice daily controls it Associated Symptoms:  
Aggravating/Alleviating factors:  
 
ROS:  The previous review of systems on initial consultation / H&P is noted and reviewed. Specific changes noted above in HPI. Current Medications:  
 
Current Facility-Administered Medications Medication Dose Route Frequency  dilTIAZem ER (CARDIZEM CD) capsule 240 mg  240 mg Oral DAILY  potassium chloride SR (KLOR-CON 10) tablet 40 mEq  40 mEq Oral BID  
  nystatin (MYCOSTATIN) 100,000 unit/mL oral suspension 500,000 Units  500,000 Units Oral QID  lipase-protease-amylase (CREON 24,000) capsule 1 Cap  1 Cap Oral TID WITH MEALS  
 apixaban (ELIQUIS) tablet 5 mg  5 mg Oral BID  predniSONE (DELTASONE) tablet 20 mg  20 mg Oral DAILY WITH BREAKFAST  phosphorus (K PHOS NEUTRAL) 250 mg tablet 1 Tab  1 Tab Oral BID  pantoprazole (PROTONIX) tablet 40 mg  40 mg Oral ACB  
 0.9% sodium chloride infusion 250 mL  250 mL IntraVENous PRN  
 0.9% sodium chloride infusion 250 mL  250 mL IntraVENous PRN  
 sodium chloride (NS) flush 5-10 mL  5-10 mL IntraVENous PRN  
 sodium chloride (NS) flush 5-40 mL  5-40 mL IntraVENous Q8H  
 sodium chloride (NS) flush 5-40 mL  5-40 mL IntraVENous PRN  
 acetaminophen (TYLENOL) tablet 650 mg  650 mg Oral Q6H PRN Or  
 acetaminophen (TYLENOL) suppository 650 mg  650 mg Rectal Q6H PRN  polyethylene glycol (MIRALAX) packet 17 g  17 g Oral DAILY PRN  promethazine (PHENERGAN) tablet 12.5 mg  12.5 mg Oral Q6H PRN Or  
 ondansetron (ZOFRAN) injection 4 mg  4 mg IntraVENous Q6H PRN  
 ipratropium-albuterol (COMBIVENT RESPIMAT) 20 mcg-100 mcg inhalation spray  1 Puff Inhalation Q4H RT  
 0.9% sodium chloride infusion 250 mL  250 mL IntraVENous PRN  
 glucose chewable tablet 16 g  4 Tab Oral PRN  
 dextrose (D50W) injection syrg 12.5-25 g  25-50 mL IntraVENous PRN  
 glucagon (GLUCAGEN) injection 1 mg  1 mg IntraMUSCular PRN  
 insulin glargine (LANTUS) injection 23 Units  0.2 Units/kg SubCUTAneous QHS  insulin lispro (HUMALOG) injection   SubCUTAneous QID WITH MEALS  cholecalciferol (VITAMIN D3) (1000 Units /25 mcg) tablet 2 Tab  2,000 Units Oral DAILY  cyanocobalamin (VITAMIN B12) tablet 1,000 mcg  1,000 mcg Oral DAILY  DULoxetine (CYMBALTA) capsule 60 mg  60 mg Oral DAILY  metoprolol tartrate (LOPRESSOR) tablet 25 mg  25 mg Oral DAILY PRN  
 mirtazapine (REMERON) tablet 15 mg  15 mg Oral QHS  montelukast (SINGULAIR) tablet 10 mg  10 mg Oral QPM  
 
 
Objective: VITALS:  
Last 24hrs VS reviewed since prior progress note. Most recent are: 
Visit Vitals BP (!) 146/57 (BP 1 Location: Left arm) Pulse 99 Temp 98.4 °F (36.9 °C) Resp 23 Ht 5' 7\" (1.702 m) Wt 115.3 kg (254 lb 1.6 oz) SpO2 100% BMI 39.80 kg/m² Temp (24hrs), Av.4 °F (36.9 °C), Min:97.5 °F (36.4 °C), Max:99.5 °F (37.5 °C) Intake/Output Summary (Last 24 hours) at 2021 1102 Last data filed at 2021 8475 Gross per 24 hour Intake 580 ml Output 1500 ml Net -920 ml EXAM: 
General:  Comfortable, no distress   
HEENT:  Atraumatic skull, pupils equal 
Lungs:   No abnormal audible breath sounds. Speaking in complete sentences Heart:   No abnormal audible heart sounds. Well perfused Abdomen:   Rotund related to adiposity Musc:   No skeletal defects or deformities Neurologic:   Cranial nerves grossly intact, moves all 4 extremities Psych:    Good insight. Not anxious nor agitated Derm:   No rash, jaundice, nor palpable dermatologic mass Lab Data Reviewed:  
Recent Labs 21 
6540 21 
0550 21 
8509 WBC 14.3* 14.4* 14.4* HGB 8.5* 8.5* 8.6* HCT 29.4* 29.7* 28.7*  
 325 352 Recent Labs 21 
7082 21 
1478 21 
0550 21 
0130   --  142 141  
K 3.4*  --  3.4* 3.7   --  106 108 CO2 36*  --  34* 34* GLU 81  --  110* 130* BUN 18  --  17 17 CREA 0.98  --  1.16* 1.11* CA 8.1*  --  7.6* 7.9*  
MG  --  2.3  --   --   
ALB 2.4*  --  2.4* 2.3* TBILI 0.6  --  0.6 0.5 ALT 26  --  23 24 Lab Results Component Value Date/Time Glucose (POC) 65 2021 10:27 AM  
 Glucose (POC) 55 (L) 2021 09:15 AM  
 Glucose (POC) 39 (LL) 2021 09:14 AM  
 Glucose (POC) 200 (H) 2021 10:08 PM  
 Glucose (POC) 138 (H) 2021 04:03 PM  
 
No results for input(s): PH, PCO2, PO2, HCO3, FIO2 in the last 72 hours. No results for input(s): INR, INREXT in the last 72 hours. Assessment: (See above) Active Problems: 
  Left lower lobe pneumonia (1/1/2021) Plan: (See above) Signed By: Michelle Bolanos MD   
 1/6/2021  11:02 AM

## 2021-01-06 NOTE — PROGRESS NOTES
Transition Plan of Care RUR 35% 
  
  
Plan: 1. Monitor patient's response to treatment. 2. Patient plans to return to [address] [place] [address]. 1st covid done on 1/1. A 2nd Covid is needed for placement. 3. Daughter or shuttle to transport. 4. Consult for Rehab received, patient refused Rehab and would like to continue therapy with [facility]. I will need a Presumption of Care order. 5. Case Management to follow.  
 
 
Rowe Fabry, BS

## 2021-01-06 NOTE — PROGRESS NOTES
Daily Progress Note: 1/6/2021 Luigi Zambrano MD 
 
Assessment/Plan:  
Left lower lobe pneumonia 
-Continue Rocephin and azithromycin 
-Covid negative 
-pulmonology following 
  
Acute hypoxic respiratory failure 
-Continue with supplemental oxygen 
-Patient uses 3 L of oxygen at baseline Afib with RVR, fluid overloaded 
-dilt drip changed to po per Cards 
 
-monitor mag/phos/K+ and replete PRN 
-Continue with digoxin and hold Eliquis until GI/Cards say 
-Consulted cardiology 
- lasix/diuretics per Cards 
  
Anemia - acute blood loss on chronic 
-Occult blood positive 
-Transfused 2 units PRBC so far -GI on board- endoscopies 1/4 
  
Sepsis 
-Continue treatment of above 
-Follow cultures 
  
Elevated creatinine 
-monitor, treat as needed 
  
Hypertension 
-Continue with metoprolol 
  
Type 2 diabetes: sugars soft. A1c 5.9 
-Patient takes Onglyza which we will hold 
-Continue sliding scale insulin  
-lantus halved to 10 
   
Anxiety/depression 
-Continue with Cymbalta and Remeron 
   
 
Problem List: 
Problem List as of 1/6/2021 Date Reviewed: 11/2/2020 Codes Class Noted - Resolved Left lower lobe pneumonia ICD-10-CM: J18.9 ICD-9-CM: 255  1/1/2021 - Present Leukocytosis ICD-10-CM: U14.192 ICD-9-CM: 288.60  11/3/2020 - Present Chronic respiratory failure with hypoxia [facility]) ICD-10-CM: J96.11 
ICD-9-CM: 518.83, 799.02  11/2/2020 - Present Overview Signed 11/2/2020 10:12 PM by Nitza Almaraz MD  
  On 3L NC at home Cellulitis of lower extremity ICD-10-CM: L03.119 ICD-9-CM: 682.6  3/23/2020 - Present ASO (arteriosclerosis obliterans) ICD-10-CM: I70.90 ICD-9-CM: 440.9  9/5/2019 - Present PVD (peripheral vascular disease) ([facility] ICD-10-CM: I73.9 ICD-9-CM: 443.9  8/1/2019 - Present Severe obesity ([facility] ICD-10-CM: E66.01 
ICD-9-CM: 278.01  7/30/2019 - Present COPD (chronic obstructive pulmonary disease) (HCC) ICD-10-CM: J44.9 ICD-9-CM: 419  7/13/2019 - Present Normocytic anemia ICD-10-CM: D64.9 ICD-9-CM: 285.9  7/13/2019 - Present PAD (peripheral artery disease) (HCC) ICD-10-CM: I73.9 ICD-9-CM: 443.9  7/13/2019 - Present Mild intermittent asthma ICD-10-CM: J45.20 ICD-9-CM: 493.90  5/17/2019 - Present Brachial artery thrombus (HCC) ICD-10-CM: R12.6 ICD-9-CM: 444.21  4/26/2019 - Present Sleep apnea ICD-10-CM: G47.30 ICD-9-CM: 780.57  1/5/2019 - Present Overview Signed 1/5/2019  8:41 PM by Ki Santa, DO  
  wears CPAP Atrial fibrillation [facility]) ICD-10-CM: I48.91 
ICD-9-CM: 427.31  11/16/2018 - Present Obesity (BMI 30-39.9) (Chronic) ICD-10-CM: Y14.4 ICD-9-CM: 278.00  11/13/2018 - Present Recurrent deep vein thrombosis (DVT) (HCC) ICD-10-CM: I82.409 ICD-9-CM: 453.40  3/30/2018 - Present Chronic anticoagulation (Chronic) ICD-10-CM: Z79.01 
ICD-9-CM: V58.61  3/30/2018 - Present Essential hypertension (Chronic) ICD-10-CM: I10 
ICD-9-CM: 401.9  1/22/2016 - Present CAD (coronary artery disease) ICD-10-CM: I25.10 ICD-9-CM: 414.00  10/9/2015 - Present Type II diabetes mellitus (HCC) (Chronic) ICD-10-CM: E11.9 ICD-9-CM: 250.00  10/9/2015 - Present RESOLVED: Syncope and collapse ICD-10-CM: R55 
ICD-9-CM: 780.2  9/29/2020 - 11/2/2020 RESOLVED: Cellulitis ICD-10-CM: L03.90 ICD-9-CM: 682.9  3/23/2020 - 5/29/2020 RESOLVED: Hypokalemia ICD-10-CM: E87.6 ICD-9-CM: 276.8  3/23/2020 - 5/29/2020 RESOLVED: Hyponatremia ICD-10-CM: E87.1 ICD-9-CM: 276.1  3/23/2020 - 5/29/2020 RESOLVED: Diarrhea ICD-10-CM: R19.7 ICD-9-CM: 787.91  3/23/2020 - 5/29/2020 RESOLVED: Syncope and collapse ICD-10-CM: R55 
ICD-9-CM: 780.2  7/13/2019 - 5/29/2020 RESOLVED: UTI (urinary tract infection) ICD-10-CM: N39.0 ICD-9-CM: 599.0  7/13/2019 - 11/2/2020 RESOLVED: Sepsis ([facility] 75.) ICD-10-CM: A41.9 ICD-9-CM: 038.9, 995.91  7/13/2019 - 11/3/2020 RESOLVED: Leg wound, left ICD-10-CM: I09.039Q ICD-9-CM: 891.0  7/13/2019 - 5/29/2020 RESOLVED: Leg laceration, right, initial encounter ICD-10-CM: K34.457A ICD-9-CM: 894.0  7/13/2019 - 5/29/2020 RESOLVED: Cellulitis of right upper arm ICD-10-CM: L03.113 ICD-9-CM: 682.3  5/17/2019 - 5/29/2020 RESOLVED: Gangrene of finger of right hand ([facility].) ICD-10-CM: X36 
ICD-9-CM: 785.4  5/17/2019 - 11/2/2020 RESOLVED: Bowel obstruction ([facility] ICD-10-CM: S66.792 ICD-9-CM: 560.9  1/8/2019 - 5/29/2020 RESOLVED: Partial small bowel obstruction ([facility] ICD-10-CM: K56.600 ICD-9-CM: 560.9  1/5/2019 - 5/29/2020 RESOLVED: Dyspnea ICD-10-CM: R06.00 
ICD-9-CM: 786.09  11/13/2018 - 5/29/2020 RESOLVED: ARF (acute renal failure) (HCC) ICD-10-CM: N17.9 ICD-9-CM: 584.9  11/13/2018 - 5/29/2020 RESOLVED: Closed wedge compression fracture of T7 vertebra ([facility] ICD-10-CM: R73.379O ICD-9-CM: 805.2  11/13/2018 - 5/29/2020 RESOLVED: Type 2 diabetes with nephropathy ([facility].) ICD-10-CM: E11.21 
ICD-9-CM: 250.40, 583.81  10/1/2018 - 11/2/2020 RESOLVED: Chest pain ICD-10-CM: R07.9 ICD-9-CM: 786.50  6/27/2018 - 5/29/2020 RESOLVED: Abdominal pain ICD-10-CM: R10.9 ICD-9-CM: 789.00  6/27/2018 - 5/29/2020 RESOLVED: Coronary artery disease involving native coronary artery without angina pectoris (Chronic) ICD-10-CM: I25.10 ICD-9-CM: 414.01  1/22/2016 - 11/2/2020 RESOLVED: HTN (hypertension) ICD-10-CM: I10 
ICD-9-CM: 401.9  10/9/2015 - 1/22/2016 RESOLVED: NSTEMI (non-ST elevated myocardial infarction) ([facility] ICD-10-CM: I21.4 ICD-9-CM: 410.70  10/9/2015 - 5/29/2020 Subjective: Francoise Zuniga is a 70 y.o. female who presented with shortness of breath which has been progressively worsening since this weekend. She reports having by cough and sinus congestion. Symptoms acutely worsened this morning. She denies any chest pain, abdominal pain, diarrhea, or fever. She has felt chills. Upon arrival of EMS she was found to have saturations in the 70s. She has a history of chronic COPD and uses 3 L of oxygen at baseline. [Dr Bettina Price 1/2: Got 2 units PRBCs, but follow up labs unable to be obtained for the past 12 hours. I spoke with anesthesia and they will access her for central line. Pt tolerated PO, +BM yesterday. Says her legs are no more swollen than her baseline. 1/3: Sugar was in 50s before bed last night. Held bedtime humalog and halved lantus. Sugars better this AM.  Now in Afib with RVR. Responded only briefly to dilt push. Pt feels the tachycardia. Denies increased SOB or CP. Tolerated PO yesterday. Due for bowel prep today, but on hold until cardiovascularly more stable. 1/4:  She reports she feels better this AM.  Currently in sinus rhythm at this moment. She is going to endoscopy this AM.  Hb up to 8.6. WBC 14.4. Will decrease Prednisone to 20mg per day. 1/5:  Continues to feel better but still very fatigued. Remains in sinus rhythm at this moment. Upper and lower endoscopies as under Data Review. Eliquis resumed. 1/6:  About the same with no specific complaints except \"feel weak all over. \"  Back in A Fib but rate ok in 90s to low 100s. Hb stable at 8.5. She is willing to go to rehab so will get Case Management to see. Review of Systems: A comprehensive review of systems was negative except for that written in the HPI. Objective:  
Physical Exam:  
Visit Vitals BP (!) 137/53 (BP 1 Location: Left arm, BP Patient Position: At rest) Pulse 88 Temp 98 °F (36.7 °C) Resp 18 Ht 5' 7\" (1.702 m) Wt 115.3 kg (254 lb 1.6 oz) SpO2 98% BMI 39.80 kg/m² O2 Flow Rate (L/min): 4 l/min O2 Device: Nasal cannula Temp (24hrs), Av.2 °F (36.8 °C), Min:97.5 °F (36.4 °C), Max:99.5 °F (37.5 °C) No intake/output data recorded.  1901 -  0700 In: 1300 [P.O.:1300] Out: 2000 [UKRP] General:  Alert, cooperative, no distress, appears stated age, moon-face. Head:  Normocephalic, without obvious abnormality, atraumatic. Eyes:  Conjunctivae/corneas clear. EOMs intact. Throat: Lips, mucosa, and tongue moist.  
Neck: Supple, symmetrical, trachea midline, no adenopathy, thyroid: no enlargement/tenderness/nodules, no carotid bruit and no JVD. Lungs:    diminished at bases, a few scattered rhonchi Chest wall:  No tenderness or deformity. Heart:   reg with rate in 80s at this time - monitor shows sinus. no murmur, click, rub or gallop. Abdomen:   Soft, non-tender. Bowel sounds normal. No masses,  No organomegaly. Extremities: Extremities normal, atraumatic, no cyanosis, 2+ pitting edema arms and legs. No calf tenderness or cords. Pulses: 2+ and symmetric all extremities. Skin: Skin color, texture, turgor normal. No rashes Neurologic:   Alert and oriented X 3.  equal.  Gait not tested at this time. Sensation grossly normal to touch. Gross motor of extremities normal.    
 
Data Review:  
  Esophagogastroduodenoscopy (EGD) Colonoscopy Procedure Note April Vickers : 1945 [address] [place] [address] Record Number: [id number] 
  
  
Indications:    Iron deficiency anemia, diarrhea Referring Physician:  Zainab Tristan MD 
Anesthesia/Sedation: see nursing notes Endoscopist:  Dr. Akbar Perez Assistants: None Procedure in Detail: After obtaining informed consent, positioning of the patient in the left lateral decubitus position, and conduction of a pre-procedure pause or \"time out\" the endoscope was introduced into the mouth and advanced to the duodenum. A careful inspection was made, and findings or interventions are described below. Findings:  
Esophagus:numerous white exudates Stomach: antral erythema with soft submucosal mass Normal body and fundus Duodenum/jejunum: erythema, erosions second into third portion Complications/estimated blood loss: none Therapies:  biopsy of stomach antrum at site submucosal mass 
biopsy of duodenal second portion, third portion Specimens: biopsies Implants:none Endoscopist:  Dr. Wing Oseguera Assistants: None Complications:  None Estimate Blood Loss:  None 
  
Colonoscopy Procedure in Detail: After obtaining informed consent, positioning of the patient in the left lateral decubitus position, and conduction of a pre-procedure pause or \"time out\" the endoscope was introduced into the anus and advanced to the terminal ileum. The quality of the colonic preparation was adequate. A careful inspection was made as the colonoscope was withdrawn, findings and interventions are described below. Appendiceal orifice photographed Findings: - Diverticulosis Two sessile 12 mm polyps Specimens:  
 polyps removed cold snare; random biopsies cecum, ascending Implants: none Complications:  
None; patient tolerated the procedure well. Estimated blood loss: none Impression: 
Candida esophagitis, gastritis, duodenitis, colon polyps, diverticulosis. Antral mass likely a benign lipoma Recommendations: - Await pathology. - anticoagulate tomorrow Resume diet; add creon, nystatin suspension Thank you for entrusting me with this patient's care. Please do not hesitate to contact me with any questions or if I can be of assistance with any of your other patients' GI needs. Signed By: Zee Dykes MD 
                      January 4, 2021 Recent Days: 
Recent Labs 01/06/21 
7931 01/05/21 
0550 01/04/21 
1239 WBC 14.3* 14.4* 14.4* HGB 8.5* 8.5* 8.6* HCT 29.4* 29.7* 28.7*  
 325 352 Recent Labs 01/06/21 
9493 01/05/21 
0550 01/04/21 
0130  142 141  
K 3.4* 3.4* 3.7  106 108 CO2 36* 34* 34* GLU 81 110* 130* BUN 18 17 17 CREA 0.98 1.16* 1.11* CA 8.1* 7.6* 7.9* ALB 2.4* 2.4* 2.3* ALT 26 23 24 No results for input(s): PH, PCO2, PO2, HCO3, FIO2 in the last 72 hours. 24 Hour Results: 
Recent Results (from the past 24 hour(s)) GLUCOSE, POC Collection Time: 01/05/21  9:16 AM  
Result Value Ref Range Glucose (POC) 58 (L) 65 - 100 mg/dL Performed by Ness Tashi GLUCOSE, POC Collection Time: 01/05/21  9:46 AM  
Result Value Ref Range Glucose (POC) 52 (L) 65 - 100 mg/dL Performed by Ness Tashi GLUCOSE, POC Collection Time: 01/05/21 10:07 AM  
Result Value Ref Range Glucose (POC) 82 65 - 100 mg/dL Performed by Ness Tashi GLUCOSE, POC Collection Time: 01/05/21 12:40 PM  
Result Value Ref Range Glucose (POC) 157 (H) 65 - 100 mg/dL Performed by Ness Tashi GLUCOSE, POC Collection Time: 01/05/21  4:03 PM  
Result Value Ref Range Glucose (POC) 138 (H) 65 - 100 mg/dL Performed by Ness Tashi GLUCOSE, POC Collection Time: 01/05/21 10:08 PM  
Result Value Ref Range Glucose (POC) 200 (H) 65 - 100 mg/dL Performed by Sahra Grove CBC WITH AUTOMATED DIFF Collection Time: 01/06/21  3:12 AM  
Result Value Ref Range WBC 14.3 (H) 3.6 - 11.0 K/uL  
 RBC 3.17 (L) 3.80 - 5.20 M/uL HGB 8.5 (L) 11.5 - 16.0 g/dL HCT 29.4 (L) 35.0 - 47.0 % MCV 92.7 80.0 - 99.0 FL  
 MCH 26.8 26.0 - 34.0 PG  
 MCHC 28.9 (L) 30.0 - 36.5 g/dL  
 RDW 19.3 (H) 11.5 - 14.5 % PLATELET 736 573 - 484 K/uL MPV 10.7 8.9 - 12.9 FL  
 NRBC 0.0 0  WBC ABSOLUTE NRBC 0.00 0.00 - 0.01 K/uL NEUTROPHILS 84 (H) 32 - 75 % LYMPHOCYTES 7 (L) 12 - 49 % MONOCYTES 7 5 - 13 % EOSINOPHILS 1 0 - 7 % BASOPHILS 0 0 - 1 % IMMATURE GRANULOCYTES 1 (H) 0.0 - 0.5 % ABS. NEUTROPHILS 12.1 (H) 1.8 - 8.0 K/UL  
 ABS. LYMPHOCYTES 1.0 0.8 - 3.5 K/UL  
 ABS. MONOCYTES 1.0 0.0 - 1.0 K/UL  
 ABS. EOSINOPHILS 0.1 0.0 - 0.4 K/UL  
 ABS. BASOPHILS 0.0 0.0 - 0.1 K/UL  
 ABS. IMM. GRANS. 0.1 (H) 0.00 - 0.04 K/UL  
 DF SMEAR SCANNED    
 RBC COMMENTS ANISOCYTOSIS 
1+ METABOLIC PANEL, COMPREHENSIVE Collection Time: 01/06/21  3:12 AM  
Result Value Ref Range Sodium 143 136 - 145 mmol/L Potassium 3.4 (L) 3.5 - 5.1 mmol/L Chloride 105 97 - 108 mmol/L  
 CO2 36 (H) 21 - 32 mmol/L Anion gap 2 (L) 5 - 15 mmol/L Glucose 81 65 - 100 mg/dL BUN 18 6 - 20 MG/DL Creatinine 0.98 0.55 - 1.02 MG/DL  
 BUN/Creatinine ratio 18 12 - 20 GFR est AA >60 >60 ml/min/1.73m2 GFR est non-AA 56 (L) >60 ml/min/1.73m2 Calcium 8.1 (L) 8.5 - 10.1 MG/DL Bilirubin, total 0.6 0.2 - 1.0 MG/DL  
 ALT (SGPT) 26 12 - 78 U/L  
 AST (SGOT) 24 15 - 37 U/L Alk. phosphatase 172 (H) 45 - 117 U/L Protein, total 5.2 (L) 6.4 - 8.2 g/dL Albumin 2.4 (L) 3.5 - 5.0 g/dL Globulin 2.8 2.0 - 4.0 g/dL A-G Ratio 0.9 (L) 1.1 - 2.2 SAMPLES BEING HELD Collection Time: 01/06/21  3:12 AM  
Result Value Ref Range SAMPLES BEING HELD 1SST COMMENT Add-on orders for these samples will be processed based on acceptable specimen integrity and analyte stability, which may vary by analyte. Medications reviewed Current Facility-Administered Medications Medication Dose Route Frequency  dilTIAZem ER (CARDIZEM CD) capsule 180 mg  180 mg Oral DAILY  nystatin (MYCOSTATIN) 100,000 unit/mL oral suspension 500,000 Units  500,000 Units Oral QID  lipase-protease-amylase (CREON 24,000) capsule 1 Cap  1 Cap Oral TID WITH MEALS  
  apixaban (ELIQUIS) tablet 5 mg  5 mg Oral BID  predniSONE (DELTASONE) tablet 20 mg  20 mg Oral DAILY WITH BREAKFAST  phosphorus (K PHOS NEUTRAL) 250 mg tablet 1 Tab  1 Tab Oral BID  pantoprazole (PROTONIX) tablet 40 mg  40 mg Oral ACB  
 0.9% sodium chloride infusion 250 mL  250 mL IntraVENous PRN  
 0.9% sodium chloride infusion 250 mL  250 mL IntraVENous PRN  
 sodium chloride (NS) flush 5-10 mL  5-10 mL IntraVENous PRN  
 sodium chloride (NS) flush 5-40 mL  5-40 mL IntraVENous Q8H  
 sodium chloride (NS) flush 5-40 mL  5-40 mL IntraVENous PRN  
 acetaminophen (TYLENOL) tablet 650 mg  650 mg Oral Q6H PRN Or  
 acetaminophen (TYLENOL) suppository 650 mg  650 mg Rectal Q6H PRN  polyethylene glycol (MIRALAX) packet 17 g  17 g Oral DAILY PRN  promethazine (PHENERGAN) tablet 12.5 mg  12.5 mg Oral Q6H PRN Or  
 ondansetron (ZOFRAN) injection 4 mg  4 mg IntraVENous Q6H PRN  
 ipratropium-albuterol (COMBIVENT RESPIMAT) 20 mcg-100 mcg inhalation spray  1 Puff Inhalation Q4H RT  
 0.9% sodium chloride infusion 250 mL  250 mL IntraVENous PRN  
 glucose chewable tablet 16 g  4 Tab Oral PRN  
 dextrose (D50W) injection syrg 12.5-25 g  25-50 mL IntraVENous PRN  
 glucagon (GLUCAGEN) injection 1 mg  1 mg IntraMUSCular PRN  
 insulin glargine (LANTUS) injection 23 Units  0.2 Units/kg SubCUTAneous QHS  insulin lispro (HUMALOG) injection   SubCUTAneous QID WITH MEALS  cholecalciferol (VITAMIN D3) (1000 Units /25 mcg) tablet 2 Tab  2,000 Units Oral DAILY  cyanocobalamin (VITAMIN B12) tablet 1,000 mcg  1,000 mcg Oral DAILY  DULoxetine (CYMBALTA) capsule 60 mg  60 mg Oral DAILY  metoprolol tartrate (LOPRESSOR) tablet 25 mg  25 mg Oral DAILY PRN  
 mirtazapine (REMERON) tablet 15 mg  15 mg Oral QHS  montelukast (SINGULAIR) tablet 10 mg  10 mg Oral QPM  
 
Care Plan discussed with: Patient, GI and Nurse Total time spent with patient: 30 minutes.  
Jean Mckeon MD

## 2021-01-06 NOTE — PROGRESS NOTES
Shift Change: 
Bedside and Verbal shift change report given to Fermín Culver RN (oncoming nurse) by Jamie Casper RN (offgoing nurse). Report included the following information SBAR, Kardex and Quality Measures. Shift Summary: 
0915: 
HYPOGLYCEMIC EPISODE DOCUMENTATION Patient with hypoglycemic episode(s) at 0915 on 1/6/21. BG value(s) pre-treatment 55 Was patient symptomatic? [x] yes, [] no 
Patient was treated with the following rescue medications/treatments: [] D50 [] Glucose tablets 
              [] Glucagon 
              [x] 4oz juice 
              [] 6oz reg soda 
              [] 8oz low fat milk BG value post-treatment: 65; gave 8oz juice then BG 70. Once BG treated and value greater than 70mg/dl, pt was provided with the following: 
[x] snack 
[] meal 
Name of MD notified: Grecia Martínez The following orders were received: n/a End of Shift Report: 
Bedside and Verbal shift change report given to RN (oncoming nurse) by Fermín Culver RN (offgoing nurse). Report included the following information SBAR, Kardex and Quality Measures.

## 2021-01-06 NOTE — PROGRESS NOTES
PHYSICAL THERAPY TREATMENT Patient: Jasmyn Duncan (86 y.o. female) Date: 1/6/2021 Diagnosis: Left lower lobe pneumonia [J18.9] <principal problem not specified> Procedure(s) (LRB): ESOPHAGOGASTRODUODENOSCOPY (EGD) (N/A) COLONOSCOPY (N/A) ESOPHAGOGASTRODUODENAL (EGD) BIOPSY (N/A) COLON BIOPSY (N/A) ENDOSCOPIC POLYPECTOMY (N/A) 2 Days Post-Op Precautions: Fall, WBAT Chart, physical therapy assessment, plan of care and goals were reviewed. ASSESSMENT Patient continues with skilled PT services and is progressing VERY SLOWLY towards goals. Patient was willing to attempt sitting at the edge of bed, but immediately upon sitting tried to lie back down. \"That's enough. \"  Encouraged her to remain sitting for a short while, then scoot to the edge of the bed. Attempted to work with breathing and posture given kyphosis and O2 dependence. Poor follow thru. Encouraged to try standing. Refused and immediately laid down in bed. She would not make any attempt to assist in repositioning and insisted the lift team be called. Tho patient needs SNF rehab, do not believe she will participate. Current Level of Function Impacting Discharge (mobility/balance): Requires max assist and LOTs of encouragement to assume sitting. Did not attempt to stand today; 'I can't' Other factors to consider for discharge: lives alone. Appears to be poorly motivated to participate in an active rehab program. 
    
 
PLAN : 
Patient continues to benefit from skilled intervention to address the above impairments. Continue treatment per established plan of care. to address goals. Recommendation for discharge: (in order for the patient to meet his/her long term goals) Therapy up to 5 days/week in SNF setting This discharge recommendation: 
Has not yet been discussed the attending provider and/or case management IF patient discharges home will need the following DME: none SUBJECTIVE:  
 Patient stated I'm going home and I'll have them come in and take care of me.  OBJECTIVE DATA SUMMARY:  
Critical Behavior: 
Neurologic State: Alert Orientation Level: Oriented X4 Cognition: Follows commands Functional Mobility Training: 
Bed Mobility: 
  
Supine to Sit: Maximum assistance; Additional time;Assist x1(HOB elevated) Sit to Supine: Maximum assistance; Additional time;Assist x1 Scooting: Maximum assistance; Additional time;Assist x1 Transfers: refused to attempt Balance: 
Sitting: Impaired Sitting - Static: Good (unsupported) Sitting - Dynamic: Poor (constant support) Standing: (NT) Ambulation/Gait Training: 
  
 Refused to attempt standing at edge of bed Therapeutic Exercises:  
Reviewed LE exercises tho declined to perform any other than ankle pumping. Note inadequate dorsi for standing without a posterior lean/ forces Pain Rating Periodic complaints of discomfort Activity Tolerance:  
Poor, requires frequent rest breaks, observed SOB with activity, and self limits After treatment patient left in no apparent distress:  
Supine in bed COMMUNICATION/COLLABORATION:  
The patients plan of care was discussed with: Registered nurse. Leni Avina, PT Time Calculation: 25 mins

## 2021-01-07 NOTE — PROGRESS NOTES
Bedside and Verbal shift change report given to [address] Manisha Klein RN (oncoming nurse) by Tran Deutsch RN (offgoing nurse). Report included the following information SBAR, Kardex, Intake/Output, MAR, Accordion and Recent Results. TRANSFER - OUT REPORT: 
 
Verbal report given to [facility], RN(name) on Audrey Ruiz  being transferred to [facility](unit) for routine progression of care Report consisted of patients Situation, Background, Assessment and  
Recommendations(SBAR). Information from the following report(s) SBAR, Kardex, Intake/Output, MAR, Accordion and Recent Results was reviewed with the receiving nurse. Lines:  
Peripheral IV 01/01/21 Right External jugular (Active) Site Assessment Clean, dry, & intact 01/07/21 0341 Phlebitis Assessment 0 01/07/21 0341 Infiltration Assessment 0 01/07/21 0341 Dressing Status Clean, dry, & intact 01/07/21 0341 Dressing Type Tape;Transparent 01/07/21 0341 Hub Color/Line Status Pink 01/07/21 0341 Action Taken Open ports on tubing capped 01/07/21 0341 Alcohol Cap Used Yes 01/07/21 0341 Opportunity for questions and clarification was provided. Patient transported with: 
 Monitor O2 @ 3 liters Registered Nurse Tech

## 2021-01-07 NOTE — PROGRESS NOTES
1:17 PM  
01/07/21 Transition Plan of Care RUR 37% 
  
  
Plan: 1. Monitor patient's response to treatment. 2. Patient plans to return to [address] [place] [address]. 1st covid done on 1/1. A 2nd Covid is needed for placement. 3. Daughter or shuttle to transport. 4. Consult for Rehab received, patient refused Rehab and would like to continue therapy with [facility]. Patient will  need a Presumption of Care order. 5. Case Management to follow Gatito Zafar RN CCM

## 2021-01-07 NOTE — PROGRESS NOTES
Problem: Falls - Risk of 
Goal: *Absence of Falls Description: Document Ashley Starch Fall Risk and appropriate interventions in the flowsheet. Outcome: Progressing Towards Goal 
Note: Fall Risk Interventions: 
Mobility Interventions: Assess mobility with egress test, Bed/chair exit alarm, Communicate number of staff needed for ambulation/transfer, Patient to call before getting OOB Medication Interventions: Bed/chair exit alarm, Patient to call before getting OOB, Teach patient to arise slowly Elimination Interventions: Bed/chair exit alarm, Call light in reach, Patient to call for help with toileting needs, Stay With Me (per policy) History of Falls Interventions: Bed/chair exit alarm Problem: Patient Education: Go to Patient Education Activity Goal: Patient/Family Education Outcome: Progressing Towards Goal 
  
Problem: Pressure Injury - Risk of 
Goal: *Prevention of pressure injury Description: Document Darrion Scale and appropriate interventions in the flowsheet. Outcome: Progressing Towards Goal 
Note: Pressure Injury Interventions: 
Sensory Interventions: Assess changes in LOC, Turn and reposition approx. every two hours (pillows and wedges if needed) Moisture Interventions: Absorbent underpads, Assess need for specialty bed, Moisture barrier Activity Interventions: Assess need for specialty bed, PT/OT evaluation Mobility Interventions: Turn and reposition approx. every two hours(pillow and wedges) Nutrition Interventions: Document food/fluid/supplement intake Friction and Shear Interventions: Apply protective barrier, creams and emollients Problem: Patient Education: Go to Patient Education Activity Goal: Patient/Family Education Outcome: Progressing Towards Goal 
  
Problem: Risk for Spread of Infection Goal: Prevent transmission of infectious organism to others Description: Prevent the transmission of infectious organisms to other patients, staff members, and visitors. Outcome: Progressing Towards Goal 
  
Problem: Patient Education:  Go to Education Activity Goal: Patient/Family Education Outcome: Progressing Towards Goal 
  
Problem: Patient Education: Go to Patient Education Activity Goal: Patient/Family Education Outcome: Progressing Towards Goal

## 2021-01-07 NOTE — PROGRESS NOTES
Daily Progress Note: 1/7/2021 Alvin Courser Serenity Mars MD 
 
Assessment/Plan:  
Left lower lobe pneumonia vrs localized pulmonary edema 
-Rocephin and azithromycin initially then off antibiotics per Pulmonary 
-Covid negative 
-pulmonology following 
  
Acute hypoxic respiratory failure 
-Continue with supplemental oxygen 
-Patient uses 3 L of oxygen at baseline Afib with RVR, fluid overloaded 
-dilt drip changed to po per Cards 
 
-monitor mag/phos/K+ and replete PRN 
-Cards DCed digoxin and resumed Eliquis 
-Consulted cardiology 
- lasix/diuretics per Cards 
  
Anemia - acute blood loss on chronic 
-Occult blood positive 
-Transfused 2 units PRBC so far -GI on board- endoscopies 1/4 as under Data Review - Colonic polys path revealed tubular adenomas - follow up pre GI 
  
Sepsis 
-Continue treatment of above 
- culture negative 
  
Elevated creatinine 
-monitor, treat as needed 
  
Hypertension 
-Continue with metoprolol 
  
Type 2 diabetes:  A1c 5.9 
-Patient takes Onglyza which we will hold 
-Continue sliding scale insulin  
-lantus halved to 10 
   
Anxiety/depression 
-Continue with Cymbalta and Remeron 
   
 
Problem List: 
Problem List as of 1/7/2021 Date Reviewed: 11/2/2020 Codes Class Noted - Resolved Left lower lobe pneumonia ICD-10-CM: J18.9 ICD-9-CM: 960  1/1/2021 - Present Leukocytosis ICD-10-CM: E24.835 ICD-9-CM: 288.60  11/3/2020 - Present Chronic respiratory failure with hypoxia [facility]) ICD-10-CM: J96.11 
ICD-9-CM: 518.83, 799.02  11/2/2020 - Present Overview Signed 11/2/2020 10:12 PM by Stephany Girard MD  
  On 3L NC at home Cellulitis of lower extremity ICD-10-CM: L03.119 ICD-9-CM: 682.6  3/23/2020 - Present ASO (arteriosclerosis obliterans) ICD-10-CM: I70.90 ICD-9-CM: 440.9  9/5/2019 - Present PVD (peripheral vascular disease) ([facility] ICD-10-CM: I73.9 ICD-9-CM: 443.9  8/1/2019 - Present Severe obesity ([facility] ICD-10-CM: E66.01 
ICD-9-CM: 278.01  7/30/2019 - Present COPD (chronic obstructive pulmonary disease) (HCC) ICD-10-CM: J44.9 ICD-9-CM: 543  7/13/2019 - Present Normocytic anemia ICD-10-CM: D64.9 ICD-9-CM: 285.9  7/13/2019 - Present PAD (peripheral artery disease) (HCC) ICD-10-CM: I73.9 ICD-9-CM: 443.9  7/13/2019 - Present Mild intermittent asthma ICD-10-CM: J45.20 ICD-9-CM: 493.90  5/17/2019 - Present Brachial artery thrombus (HCC) ICD-10-CM: H50.1 ICD-9-CM: 444.21  4/26/2019 - Present Sleep apnea ICD-10-CM: G47.30 ICD-9-CM: 780.57  1/5/2019 - Present Overview Signed 1/5/2019  8:41 PM by Luis Enrique Joseph DO  
  wears CPAP Atrial fibrillation [facility]) ICD-10-CM: I48.91 
ICD-9-CM: 427.31  11/16/2018 - Present Obesity (BMI 30-39.9) (Chronic) ICD-10-CM: G53.4 ICD-9-CM: 278.00  11/13/2018 - Present Recurrent deep vein thrombosis (DVT) (HCC) ICD-10-CM: I82.409 ICD-9-CM: 453.40  3/30/2018 - Present Chronic anticoagulation (Chronic) ICD-10-CM: Z79.01 
ICD-9-CM: V58.61  3/30/2018 - Present Essential hypertension (Chronic) ICD-10-CM: I10 
ICD-9-CM: 401.9  1/22/2016 - Present CAD (coronary artery disease) ICD-10-CM: I25.10 ICD-9-CM: 414.00  10/9/2015 - Present Type II diabetes mellitus (HCC) (Chronic) ICD-10-CM: E11.9 ICD-9-CM: 250.00  10/9/2015 - Present RESOLVED: Syncope and collapse ICD-10-CM: R55 
ICD-9-CM: 780.2  9/29/2020 - 11/2/2020 RESOLVED: Cellulitis ICD-10-CM: L03.90 ICD-9-CM: 682.9  3/23/2020 - 5/29/2020 RESOLVED: Hypokalemia ICD-10-CM: E87.6 ICD-9-CM: 276.8  3/23/2020 - 5/29/2020 RESOLVED: Hyponatremia ICD-10-CM: E87.1 ICD-9-CM: 276.1  3/23/2020 - 5/29/2020 RESOLVED: Diarrhea ICD-10-CM: R19.7 ICD-9-CM: 787.91  3/23/2020 - 5/29/2020 RESOLVED: Syncope and collapse ICD-10-CM: R55 
ICD-9-CM: 780.2  7/13/2019 - 5/29/2020 RESOLVED: UTI (urinary tract infection) ICD-10-CM: N39.0 ICD-9-CM: 599.0  7/13/2019 - 11/2/2020 RESOLVED: Sepsis ([facility] ICD-10-CM: A41.9 ICD-9-CM: 038.9, 995.91  7/13/2019 - 11/3/2020 RESOLVED: Leg wound, left ICD-10-CM: Y12.115G ICD-9-CM: 891.0  7/13/2019 - 5/29/2020 RESOLVED: Leg laceration, right, initial encounter ICD-10-CM: X85.000L ICD-9-CM: 894.0  7/13/2019 - 5/29/2020 RESOLVED: Cellulitis of right upper arm ICD-10-CM: L03.113 ICD-9-CM: 682.3  5/17/2019 - 5/29/2020 RESOLVED: Gangrene of finger of right hand ([facility] ICD-10-CM: F38 
ICD-9-CM: 785.4  5/17/2019 - 11/2/2020 RESOLVED: Bowel obstruction ([facility] ICD-10-CM: U01.748 ICD-9-CM: 560.9  1/8/2019 - 5/29/2020 RESOLVED: Partial small bowel obstruction ([facility] ICD-10-CM: K56.600 ICD-9-CM: 560.9  1/5/2019 - 5/29/2020 RESOLVED: Dyspnea ICD-10-CM: R06.00 
ICD-9-CM: 786.09  11/13/2018 - 5/29/2020 RESOLVED: ARF (acute renal failure) (HCC) ICD-10-CM: N17.9 ICD-9-CM: 584.9  11/13/2018 - 5/29/2020 RESOLVED: Closed wedge compression fracture of T7 vertebra ([facility] ICD-10-CM: X09.095V ICD-9-CM: 805.2  11/13/2018 - 5/29/2020 RESOLVED: Type 2 diabetes with nephropathy ([facility] ICD-10-CM: E11.21 
ICD-9-CM: 250.40, 583.81  10/1/2018 - 11/2/2020 RESOLVED: Chest pain ICD-10-CM: R07.9 ICD-9-CM: 786.50  6/27/2018 - 5/29/2020 RESOLVED: Abdominal pain ICD-10-CM: R10.9 ICD-9-CM: 789.00  6/27/2018 - 5/29/2020 RESOLVED: Coronary artery disease involving native coronary artery without angina pectoris (Chronic) ICD-10-CM: I25.10 ICD-9-CM: 414.01  1/22/2016 - 11/2/2020 RESOLVED: HTN (hypertension) ICD-10-CM: I10 
ICD-9-CM: 401.9  10/9/2015 - 1/22/2016 RESOLVED: NSTEMI (non-ST elevated myocardial infarction) ([facility] ICD-10-CM: I21.4 ICD-9-CM: 410.70  10/9/2015 - 5/29/2020 Subjective: Jos Edward is a 70 y.o. female who presented with shortness of breath which has been progressively worsening since this weekend. She reports having by cough and sinus congestion. Symptoms acutely worsened this morning. She denies any chest pain, abdominal pain, diarrhea, or fever. She has felt chills. Upon arrival of EMS she was found to have saturations in the 70s. She has a history of chronic COPD and uses 3 L of oxygen at baseline. [Dr Joey Melgar 1/2: Got 2 units PRBCs, but follow up labs unable to be obtained for the past 12 hours. I spoke with anesthesia and they will access her for central line. Pt tolerated PO, +BM yesterday. Says her legs are no more swollen than her baseline. 1/3: Sugar was in 50s before bed last night. Held bedtime humalog and halved lantus. Sugars better this AM.  Now in Afib with RVR. Responded only briefly to dilt push. Pt feels the tachycardia. Denies increased SOB or CP. Tolerated PO yesterday. Due for bowel prep today, but on hold until cardiovascularly more stable. 1/4:  She reports she feels better this AM.  Currently in sinus rhythm at this moment. She is going to endoscopy this AM.  Hb up to 8.6. WBC 14.4. Will decrease Prednisone to 20mg per day. 1/5:  Continues to feel better but still very fatigued. Remains in sinus rhythm at this moment. Upper and lower endoscopies as under Data Review. Eliquis resumed. 1/6:  About the same with no specific complaints except \"feel weak all over. \"  Back in A Fib but rate ok in 90s to low 100s. Hb stable at 8.5. She is willing to go to rehab so will get Case Management to see. 1/7:  No specific complaints. Hb 8.3. Cards reported irreg rate more of PACs than A fib at this time. Repeat CXR today. Stable for rehab when bed found. Tubular adenomas on colon polyp path - follow up per GI. Review of Systems: A comprehensive review of systems was negative except for that written in the HPI. Objective:  
Physical Exam:  
Visit Vitals BP (!) 140/54 (BP 1 Location: Left arm, BP Patient Position: At rest) Pulse 88 Temp 97.6 °F (36.4 °C) Resp 26 Ht 5' 7\" (1.702 m) Wt 115.8 kg (255 lb 6.4 oz) SpO2 100% BMI 40.00 kg/m² O2 Flow Rate (L/min): 3 l/min O2 Device: Nasal cannula Temp (24hrs), Av.2 °F (36.8 °C), Min:97.6 °F (36.4 °C), Max:98.6 °F (37 °C) No intake/output data recorded.  1901 -  0700 In: 340 [P.O.:340] Out: 800 [Urine:800] General:  Alert, cooperative, no distress, appears stated age, moon-face. Head:  Normocephalic, without obvious abnormality, atraumatic. Eyes:  Conjunctivae/corneas clear. EOMs intact. Throat: Lips, mucosa, and tongue moist.  
Neck: Supple, symmetrical, trachea midline, no adenopathy, thyroid: no enlargement/tenderness/nodules, no carotid bruit and no JVD. Lungs:    diminished at bases, a few scattered rhonchi Chest wall:  No tenderness or deformity. Heart:   reg with rate in 80s at this time - monitor shows sinus. no murmur, click, rub or gallop. Abdomen:   Soft, non-tender. Bowel sounds normal. No masses,  No organomegaly. Extremities: Extremities normal, atraumatic, no cyanosis, 2+ pitting edema legs - baseline. Arms less swollen. No calf tenderness or cords. Pulses: 2+ and symmetric all extremities. Skin: Skin color, texture, turgor normal. No rashes Neurologic:   Alert and oriented X 3.  equal.  Gait not tested at this time. Sensation grossly normal to touch. Gross motor of extremities normal.    
 
Data Review:  
  Esophagogastroduodenoscopy (EGD) Colonoscopy Procedure Note Alexandria Lee : 1945 [address] [place] [address] Record Number: [id number] 
  
  
Indications:    Iron deficiency anemia, diarrhea Referring Physician:  Clement Stuart MD 
Anesthesia/Sedation: see nursing notes Endoscopist:  Dr. Roque Powell Assistants: None Procedure in Detail: After obtaining informed consent, positioning of the patient in the left lateral decubitus position, and conduction of a pre-procedure pause or \"time out\" the endoscope was introduced into the mouth and advanced to the duodenum. A careful inspection was made, and findings or interventions are described below. Findings:  
Esophagus:numerous white exudates Stomach: antral erythema with soft submucosal mass Normal body and fundus Duodenum/jejunum: erythema, erosions second into third portion Complications/estimated blood loss: none Therapies:  biopsy of stomach antrum at site submucosal mass 
biopsy of duodenal second portion, third portion Specimens: biopsies Implants:none Endoscopist:  Dr. Brook Renteria Assistants: None Complications:  None Estimate Blood Loss:  None 
  
Colonoscopy Procedure in Detail: After obtaining informed consent, positioning of the patient in the left lateral decubitus position, and conduction of a pre-procedure pause or \"time out\" the endoscope was introduced into the anus and advanced to the terminal ileum. The quality of the colonic preparation was adequate. A careful inspection was made as the colonoscope was withdrawn, findings and interventions are described below. Appendiceal orifice photographed Findings: - Diverticulosis Two sessile 12 mm polyps Specimens:  
 polyps removed cold snare; random biopsies cecum, ascending Implants: none Complications:  
None; patient tolerated the procedure well. Estimated blood loss: none Impression: 
Candida esophagitis, gastritis, duodenitis, colon polyps, diverticulosis. Antral mass likely a benign lipoma Recommendations: - Await pathology. - anticoagulate tomorrow Resume diet; add creon, nystatin suspension Thank you for entrusting me with this patient's care. Please do not hesitate to contact me with any questions or if I can be of assistance with any of your other patients' GI needs. Signed By: Brook Renteria MD 
                      January 4, 2021 Recent Days: 
Recent Labs 01/07/21 
8384 01/06/21 
4557 01/05/21 
0247 WBC 14.3* 14.3* 14.4* HGB 8.3* 8.5* 8.5* HCT 29.1* 29.4* 29.7*  
 295 325 Recent Labs 01/07/21 
7774 01/06/21 
5861 01/06/21 
5566 01/05/21 
0957  143  --  142  
K 4.6 3.4*  --  3.4*  
 105  --  106 CO2 34* 36*  --  34* * 81  --  110* BUN 18 18  --  17  
CREA 1.05* 0.98  --  1.16* CA 7.7* 8.1*  --  7.6*  
MG  --   --  2.3  --   
ALB 2.3* 2.4*  --  2.4* ALT 30 26  --  23 No results for input(s): PH, PCO2, PO2, HCO3, FIO2 in the last 72 hours. 24 Hour Results: 
Recent Results (from the past 24 hour(s)) GLUCOSE, POC Collection Time: 01/06/21  9:14 AM  
Result Value Ref Range Glucose (POC) 39 (LL) 65 - 100 mg/dL Performed by [employer] GLUCOSE, POC Collection Time: 01/06/21  9:15 AM  
Result Value Ref Range Glucose (POC) 55 (L) 65 - 100 mg/dL Performed by [facility] GLUCOSE, POC Collection Time: 01/06/21 10:27 AM  
Result Value Ref Range Glucose (POC) 65 65 - 100 mg/dL Performed by [employer] GLUCOSE, POC Collection Time: 01/06/21 11:48 AM  
Result Value Ref Range Glucose (POC) 70 65 - 100 mg/dL Performed by [facility] GLUCOSE, POC Collection Time: 01/06/21  3:06 PM  
Result Value Ref Range Glucose (POC) 161 (H) 65 - 100 mg/dL Performed by [facility] GLUCOSE, POC Collection Time: 01/06/21  7:59 PM  
Result Value Ref Range Glucose (POC) 226 (H) 65 - 100 mg/dL Performed by Jose Alfredo Ralph (PCT) CBC WITH AUTOMATED DIFF Collection Time: 01/07/21  3:13 AM  
Result Value Ref Range WBC 14.3 (H) 3.6 - 11.0 K/uL  
 RBC 3.09 (L) 3.80 - 5.20 M/uL HGB 8.3 (L) 11.5 - 16.0 g/dL HCT 29.1 (L) 35.0 - 47.0 % MCV 94.2 80.0 - 99.0 FL  
 MCH 26.9 26.0 - 34.0 PG  
 MCHC 28.5 (L) 30.0 - 36.5 g/dL RDW 20.5 (H) 11.5 - 14.5 % PLATELET 731 320 - 460 K/uL MPV 9.9 8.9 - 12.9 FL  
 NRBC 0.0 0  WBC ABSOLUTE NRBC 0.00 0.00 - 0.01 K/uL NEUTROPHILS 87 (H) 32 - 75 % LYMPHOCYTES 5 (L) 12 - 49 % MONOCYTES 7 5 - 13 % EOSINOPHILS 0 0 - 7 % BASOPHILS 0 0 - 1 % IMMATURE GRANULOCYTES 1 (H) 0.0 - 0.5 % ABS. NEUTROPHILS 12.5 (H) 1.8 - 8.0 K/UL  
 ABS. LYMPHOCYTES 0.7 (L) 0.8 - 3.5 K/UL  
 ABS. MONOCYTES 1.0 0.0 - 1.0 K/UL  
 ABS. EOSINOPHILS 0.0 0.0 - 0.4 K/UL  
 ABS. BASOPHILS 0.0 0.0 - 0.1 K/UL  
 ABS. IMM. GRANS. 0.1 (H) 0.00 - 0.04 K/UL  
 DF SMEAR SCANNED    
 RBC COMMENTS ANISOCYTOSIS 
2+ METABOLIC PANEL, COMPREHENSIVE Collection Time: 01/07/21  3:13 AM  
Result Value Ref Range Sodium 144 136 - 145 mmol/L Potassium 4.6 3.5 - 5.1 mmol/L Chloride 108 97 - 108 mmol/L  
 CO2 34 (H) 21 - 32 mmol/L Anion gap 2 (L) 5 - 15 mmol/L Glucose 163 (H) 65 - 100 mg/dL BUN 18 6 - 20 MG/DL Creatinine 1.05 (H) 0.55 - 1.02 MG/DL  
 BUN/Creatinine ratio 17 12 - 20 GFR est AA >60 >60 ml/min/1.73m2 GFR est non-AA 51 (L) >60 ml/min/1.73m2 Calcium 7.7 (L) 8.5 - 10.1 MG/DL Bilirubin, total 0.6 0.2 - 1.0 MG/DL  
 ALT (SGPT) 30 12 - 78 U/L  
 AST (SGOT) 24 15 - 37 U/L Alk. phosphatase 183 (H) 45 - 117 U/L Protein, total 5.0 (L) 6.4 - 8.2 g/dL Albumin 2.3 (L) 3.5 - 5.0 g/dL Globulin 2.7 2.0 - 4.0 g/dL A-G Ratio 0.9 (L) 1.1 - 2.2 SAMPLES BEING HELD Collection Time: 01/07/21  3:13 AM  
Result Value Ref Range SAMPLES BEING HELD 1SST COMMENT Add-on orders for these samples will be processed based on acceptable specimen integrity and analyte stability, which may vary by analyte. Medications reviewed Current Facility-Administered Medications Medication Dose Route Frequency  dilTIAZem ER (CARDIZEM CD) capsule 240 mg  240 mg Oral DAILY  ferrous sulfate tablet 325 mg  1 Tab Oral DAILY WITH BREAKFAST  loperamide (IMODIUM) capsule 2 mg  2 mg Oral BID  
  nystatin (MYCOSTATIN) 100,000 unit/mL oral suspension 500,000 Units  500,000 Units Oral QID  lipase-protease-amylase (CREON 24,000) capsule 1 Cap  1 Cap Oral TID WITH MEALS  
 apixaban (ELIQUIS) tablet 5 mg  5 mg Oral BID  predniSONE (DELTASONE) tablet 20 mg  20 mg Oral DAILY WITH BREAKFAST  phosphorus (K PHOS NEUTRAL) 250 mg tablet 1 Tab  1 Tab Oral BID  pantoprazole (PROTONIX) tablet 40 mg  40 mg Oral ACB  
 0.9% sodium chloride infusion 250 mL  250 mL IntraVENous PRN  
 0.9% sodium chloride infusion 250 mL  250 mL IntraVENous PRN  
 sodium chloride (NS) flush 5-10 mL  5-10 mL IntraVENous PRN  
 sodium chloride (NS) flush 5-40 mL  5-40 mL IntraVENous Q8H  
 sodium chloride (NS) flush 5-40 mL  5-40 mL IntraVENous PRN  
 acetaminophen (TYLENOL) tablet 650 mg  650 mg Oral Q6H PRN Or  
 acetaminophen (TYLENOL) suppository 650 mg  650 mg Rectal Q6H PRN  polyethylene glycol (MIRALAX) packet 17 g  17 g Oral DAILY PRN  promethazine (PHENERGAN) tablet 12.5 mg  12.5 mg Oral Q6H PRN Or  
 ondansetron (ZOFRAN) injection 4 mg  4 mg IntraVENous Q6H PRN  
 ipratropium-albuterol (COMBIVENT RESPIMAT) 20 mcg-100 mcg inhalation spray  1 Puff Inhalation Q4H RT  
 0.9% sodium chloride infusion 250 mL  250 mL IntraVENous PRN  
 glucose chewable tablet 16 g  4 Tab Oral PRN  
 dextrose (D50W) injection syrg 12.5-25 g  25-50 mL IntraVENous PRN  
 glucagon (GLUCAGEN) injection 1 mg  1 mg IntraMUSCular PRN  
 insulin glargine (LANTUS) injection 23 Units  0.2 Units/kg SubCUTAneous QHS  insulin lispro (HUMALOG) injection   SubCUTAneous QID WITH MEALS  cholecalciferol (VITAMIN D3) (1000 Units /25 mcg) tablet 2 Tab  2,000 Units Oral DAILY  cyanocobalamin (VITAMIN B12) tablet 1,000 mcg  1,000 mcg Oral DAILY  DULoxetine (CYMBALTA) capsule 60 mg  60 mg Oral DAILY  metoprolol tartrate (LOPRESSOR) tablet 25 mg  25 mg Oral DAILY PRN  
 mirtazapine (REMERON) tablet 15 mg  15 mg Oral QHS  montelukast (SINGULAIR) tablet 10 mg  10 mg Oral QPM  
 
Care Plan discussed with: Patient, GI and Nurse Total time spent with patient: 30 minutes.  
Dicky Kayser, MD

## 2021-01-07 NOTE — ROUTINE PROCESS
Bedside and Verbal shift change report given to Jl (oncoming nurse) by Isaak Gamez (offgoing nurse). Report included the following information SBAR, Kardex, ED Summary, Procedure Summary, Intake/Output, MAR, Recent Results and Cardiac Rhythm NSR.

## 2021-01-07 NOTE — PROGRESS NOTES
Problem: Mobility Impaired (Adult and Pediatric) Goal: *Acute Goals and Plan of Care (Insert Text) Description: FUNCTIONAL STATUS PRIOR TO ADMISSION: Patient was modified independent using a rolling walker for functional mobility. HOME SUPPORT PRIOR TO ADMISSION: The patient lived with daughter and grand-daughter but did not require assist. 
 
Physical Therapy Goals Initiated 1/5/2021 1. Patient will move from supine to sit and sit to supine  in bed with moderate assistance  within 7 day(s). 2.  Patient will transfer from bed to chair and chair to bed with moderate assistance  using the least restrictive device within 7 day(s). 3.  Patient will perform sit to stand with moderate assistance  within 7 day(s). 4.  Patient will ambulate with moderate assistance  for 50 feet with the least restrictive device within 7 day(s). Outcome: Progressing Towards Goal 
 PHYSICAL THERAPY TREATMENT Patient: Jesus Asencio (74 y.o. female) Date: 1/7/2021 Diagnosis: Left lower lobe pneumonia [J18.9] <principal problem not specified> Procedure(s) (LRB): ESOPHAGOGASTRODUODENOSCOPY (EGD) (N/A) COLONOSCOPY (N/A) ESOPHAGOGASTRODUODENAL (EGD) BIOPSY (N/A) COLON BIOPSY (N/A) ENDOSCOPIC POLYPECTOMY (N/A) 3 Days Post-Op Precautions: Fall, WBAT Chart, physical therapy assessment, plan of care and goals were reviewed. ASSESSMENT Patient continues with skilled PT services and is progressing towards goals. Initially patient refusing PT and with encouragement agrees to LE supine bed exercises. Noted patient soiled with urine and nursing available to help rolling in bed both direction for max A x 1 for cleaning up. Patient up in bed x 2 total A. LE exercises performed with assistance-see below. Noted mild redness on back and PT encouraged patient post treatment to be in sidelying. Patient agrees and assisted into R sidelying with pillow props and rails up. Patient pre RR 17 and during activity 22-27 however SATs remain >90% ON 3L NC. Patient is very deconditioned and has low motivation and requires encouragement for participation. Current Level of Function Impacting Discharge (mobility/balance): max A Other factors to consider for discharge: PLAN : 
Patient continues to benefit from skilled intervention to address the above impairments. Continue treatment per established plan of care. to address goals. Recommendation for discharge: (in order for the patient to meet his/her long term goals) Therapy up to 5 days/week in SNF setting This discharge recommendation: 
Has not yet been discussed the attending provider and/or case management IF patient discharges home will need the following DME: to be determined (TBD) SUBJECTIVE:  
Patient stated I don't want to get up I don't feel goo.  OBJECTIVE DATA SUMMARY:  
Critical Behavior: 
Neurologic State: Alert Orientation Level: Oriented X4 Cognition: Appropriate for age attention/concentration, Appropriate safety awareness, Follows commands Functional Mobility Training: 
Bed Mobility: 
Rolling: Maximum assistance; Additional time;Assist x1 Supine to Sit: (refused to sit EOB agrees to LE exercise) Scooting: Total assistance;Assist x2(Up in bed) Transfers: refused transfers OOB, refused sitting EOB Balance: Ambulation/Gait Training:NT Stairs:unable Therapeutic Exercises:  
Heel slides x 5 AAROM each LE 
Gluteal sets x 5 Ankle pumps x 10each UE reaching up x 5 each Pain Rating: 
Denies Activity Tolerance:  
Fair and Increased RR from baseline After treatment patient left in no apparent distress:  
Supine in bed and Call bell within reach COMMUNICATION/COLLABORATION:  
The patients plan of care was discussed with: Registered nurse. Benny Peña DPT Time Calculation: 21 mins

## 2021-01-08 NOTE — PROGRESS NOTES
Transitional Plan of Care: RUR-38% 1. Monitor patient's response to treatment. 2. Patient plans to return to [address] [place] [address]. 1st covid done on 1/1. A 2nd Covid is needed for placement. 3. Daughter or shuttle to transport. 4. A referral was sent in [facility] to [address] [place] [address] 5. Case Management to follow BALTA Macias RN

## 2021-01-08 NOTE — WOUND CARE
Wound care consult: 
Patient noted to have skin tear to left shin Scabbed area to left knee Assessment: 
All skin folds and bony prominences assessed, turned with staff assistance. Skin to right anterior lower leg, intact, no open wound noted. Left knee wound draining pus, scab was removed easily. Patient states outpatient wound care physician has been treating and on a daily basis continues to drain purulent drainage Treatment: 
Incontinent care given with 
Repositioned in bed Heels floated Dry dressing to left knee wound Recommendations/Plan Turn, reposition every 2 hours as tolerated, float heels, off loading boots Incontinent care --- Apply Zinc to all open areas, moisture barrier as needed. Will follow, reconsult as needed.  
 
Brittni Davila RN

## 2021-01-08 NOTE — PROGRESS NOTES
Patient ate tray before sugar check and stated she did not want to check blood sugar. Patient stable. RN will continue to monitor.

## 2021-01-08 NOTE — PROGRESS NOTES
Bedside and Verbal shift change report given to Ting Harvey RN (oncoming nurse) by Jose Calles RN (offgoing nurse). Report included the following information SBAR, Kardex, MAR and Accordion.

## 2021-01-08 NOTE — PROGRESS NOTES
Bedside shift change report given to MADAI Ramirez (oncoming nurse) by Verna Quintanilla RN (offgoing nurse). Report included the following information SBAR, Kardex, MAR, Accordion and Recent Results.

## 2021-01-08 NOTE — PROGRESS NOTES
Daily Progress Note: 1/8/2021 
Shubham Parks MD 
 
Assessment/Plan:  
Left lower lobe pneumonia vrs localized pulmonary edema 
-Rocephin and azithromycin initially then off antibiotics per Pulmonary 
-Covid negative 
-pulmonology following 
  
Acute hypoxic respiratory failure 
-Continue with supplemental oxygen 
-Patient uses 3 L of oxygen at baseline 
 
Afib with RVR, fluid overloaded 
-dilt drip changed to po per Cards 
 
-monitor mag/phos/K+ and replete PRN 
-Cards DCed digoxin and resumed Eliquis 
-Consulted cardiology 
- lasix/diuretics per Cards 
  
Anemia - acute blood loss on chronic 
-Occult blood positive 
-Transfused 2 units PRBC so far 
-GI on board- endoscopies 1/4 as under Data Review 
- Colonic polys path revealed tubular adenomas - follow up pre GI 
  
Sepsis 
-Continue treatment of above 
- culture negative 
  
Elevated creatinine 
-monitor, treat as needed 
  
Hypertension 
-Continue with metoprolol 
  
Type 2 diabetes:  A1c 5.9 
-Patient takes Onglyza which we will hold 
-Continue sliding scale insulin  
-lantus halved to 10 
   
Anxiety/depression 
-Continue with Cymbalta and Remeron 
   
 
Problem List: 
Problem List as of 1/8/2021 Date Reviewed: 11/2/2020  
       Codes Class Noted - Resolved  
 Left lower lobe pneumonia ICD-10-CM: J18.9 
ICD-9-CM: 486  1/1/2021 - Present  
   
 Leukocytosis ICD-10-CM: D72.829 
ICD-9-CM: 288.60  11/3/2020 - Present  
   
 Chronic respiratory failure with hypoxia (HCC) ICD-10-CM: J96.11 
ICD-9-CM: 518.83, 799.02  11/2/2020 - Present  
 Overview Signed 11/2/2020 10:12 PM by Li Lam MD  
  On 3L NC at home  
  
  
   
 Cellulitis of lower extremity ICD-10-CM: L03.119 
ICD-9-CM: 682.6  3/23/2020 - Present  
   
 ASO (arteriosclerosis obliterans) ICD-10-CM: I70.90 
ICD-9-CM: 440.9  9/5/2019 - Present  
   
 PVD (peripheral vascular disease) (HCC) ICD-10-CM: I73.9 
ICD-9-CM: 443.9  8/1/2019 - Present  
   
 Severe obesity ([facility] ICD-10-CM: E66.01 
ICD-9-CM: 278.01  7/30/2019 - Present COPD (chronic obstructive pulmonary disease) (HCC) ICD-10-CM: J44.9 ICD-9-CM: 561  7/13/2019 - Present Normocytic anemia ICD-10-CM: D64.9 ICD-9-CM: 285.9  7/13/2019 - Present PAD (peripheral artery disease) (HCC) ICD-10-CM: I73.9 ICD-9-CM: 443.9  7/13/2019 - Present Mild intermittent asthma ICD-10-CM: J45.20 ICD-9-CM: 493.90  5/17/2019 - Present Brachial artery thrombus (HCC) ICD-10-CM: O61.8 ICD-9-CM: 444.21  4/26/2019 - Present Sleep apnea ICD-10-CM: G47.30 ICD-9-CM: 780.57  1/5/2019 - Present Overview Signed 1/5/2019  8:41 PM by Driss Marley DO  
  wears CPAP Atrial fibrillation [facility]) ICD-10-CM: I48.91 
ICD-9-CM: 427.31  11/16/2018 - Present Obesity (BMI 30-39.9) (Chronic) ICD-10-CM: L80.8 ICD-9-CM: 278.00  11/13/2018 - Present Recurrent deep vein thrombosis (DVT) (HCC) ICD-10-CM: I82.409 ICD-9-CM: 453.40  3/30/2018 - Present Chronic anticoagulation (Chronic) ICD-10-CM: Z79.01 
ICD-9-CM: V58.61  3/30/2018 - Present Essential hypertension (Chronic) ICD-10-CM: I10 
ICD-9-CM: 401.9  1/22/2016 - Present CAD (coronary artery disease) ICD-10-CM: I25.10 ICD-9-CM: 414.00  10/9/2015 - Present Type II diabetes mellitus (HCC) (Chronic) ICD-10-CM: E11.9 ICD-9-CM: 250.00  10/9/2015 - Present RESOLVED: Syncope and collapse ICD-10-CM: R55 
ICD-9-CM: 780.2  9/29/2020 - 11/2/2020 RESOLVED: Cellulitis ICD-10-CM: L03.90 ICD-9-CM: 682.9  3/23/2020 - 5/29/2020 RESOLVED: Hypokalemia ICD-10-CM: E87.6 ICD-9-CM: 276.8  3/23/2020 - 5/29/2020 RESOLVED: Hyponatremia ICD-10-CM: E87.1 ICD-9-CM: 276.1  3/23/2020 - 5/29/2020 RESOLVED: Diarrhea ICD-10-CM: R19.7 ICD-9-CM: 787.91  3/23/2020 - 5/29/2020 RESOLVED: Syncope and collapse ICD-10-CM: R55 
ICD-9-CM: 780.2  7/13/2019 - 5/29/2020 RESOLVED: UTI (urinary tract infection) ICD-10-CM: N39.0 ICD-9-CM: 599.0  7/13/2019 - 11/2/2020 RESOLVED: Sepsis ([facility] ICD-10-CM: A41.9 ICD-9-CM: 038.9, 995.91  7/13/2019 - 11/3/2020 RESOLVED: Leg wound, left ICD-10-CM: N35.836P ICD-9-CM: 891.0  7/13/2019 - 5/29/2020 RESOLVED: Leg laceration, right, initial encounter ICD-10-CM: L00.324K ICD-9-CM: 894.0  7/13/2019 - 5/29/2020 RESOLVED: Cellulitis of right upper arm ICD-10-CM: L03.113 ICD-9-CM: 682.3  5/17/2019 - 5/29/2020 RESOLVED: Gangrene of finger of right hand ([facility] ICD-10-CM: V84 
ICD-9-CM: 785.4  5/17/2019 - 11/2/2020 RESOLVED: Bowel obstruction ([facility] ICD-10-CM: I78.359 ICD-9-CM: 560.9  1/8/2019 - 5/29/2020 RESOLVED: Partial small bowel obstruction ([facility] ICD-10-CM: K56.600 ICD-9-CM: 560.9  1/5/2019 - 5/29/2020 RESOLVED: Dyspnea ICD-10-CM: R06.00 
ICD-9-CM: 786.09  11/13/2018 - 5/29/2020 RESOLVED: ARF (acute renal failure) (HCC) ICD-10-CM: N17.9 ICD-9-CM: 584.9  11/13/2018 - 5/29/2020 RESOLVED: Closed wedge compression fracture of T7 vertebra ([facility] ICD-10-CM: P31.027P ICD-9-CM: 805.2  11/13/2018 - 5/29/2020 RESOLVED: Type 2 diabetes with nephropathy ([facility] ICD-10-CM: E11.21 
ICD-9-CM: 250.40, 583.81  10/1/2018 - 11/2/2020 RESOLVED: Chest pain ICD-10-CM: R07.9 ICD-9-CM: 786.50  6/27/2018 - 5/29/2020 RESOLVED: Abdominal pain ICD-10-CM: R10.9 ICD-9-CM: 789.00  6/27/2018 - 5/29/2020 RESOLVED: Coronary artery disease involving native coronary artery without angina pectoris (Chronic) ICD-10-CM: I25.10 ICD-9-CM: 414.01  1/22/2016 - 11/2/2020 RESOLVED: HTN (hypertension) ICD-10-CM: I10 
ICD-9-CM: 401.9  10/9/2015 - 1/22/2016 RESOLVED: NSTEMI (non-ST elevated myocardial infarction) ([facility] ICD-10-CM: I21.4 ICD-9-CM: 410.70  10/9/2015 - 5/29/2020 Subjective: Vita Lam is a 70 y.o. female who presented with shortness of breath which has been progressively worsening since this weekend. She reports having by cough and sinus congestion. Symptoms acutely worsened this morning. She denies any chest pain, abdominal pain, diarrhea, or fever. She has felt chills. Upon arrival of EMS she was found to have saturations in the 70s. She has a history of chronic COPD and uses 3 L of oxygen at baseline. [Dr Martinez Stake 1/2: Got 2 units PRBCs, but follow up labs unable to be obtained for the past 12 hours. I spoke with anesthesia and they will access her for central line. Pt tolerated PO, +BM yesterday. Says her legs are no more swollen than her baseline. 1/3: Sugar was in 50s before bed last night. Held bedtime humalog and halved lantus. Sugars better this AM.  Now in Afib with RVR. Responded only briefly to dilt push. Pt feels the tachycardia. Denies increased SOB or CP. Tolerated PO yesterday. Due for bowel prep today, but on hold until cardiovascularly more stable. 1/4:  She reports she feels better this AM.  Currently in sinus rhythm at this moment. She is going to endoscopy this AM.  Hb up to 8.6. WBC 14.4. Will decrease Prednisone to 20mg per day. 1/5:  Continues to feel better but still very fatigued. Remains in sinus rhythm at this moment. Upper and lower endoscopies as under Data Review. Eliquis resumed. 1/6:  About the same with no specific complaints except \"feel weak all over. \"  Back in A Fib but rate ok in 90s to low 100s. Hb stable at 8.5. She is willing to go to rehab so will get Case Management to see. 1/7:  No specific complaints. Hb 8.3. Cards reported irreg rate more of PACs than A fib at this time. Repeat CXR today. Stable for rehab when bed found. Tubular adenomas on colon polyp path - follow up per GI. :  No specific complaints. Still very weak, cannot get up on her own and I do not think she can manage at assisted living without 24/7 care at this point. CXR yesterday without progression. She declined rehab yesterday but after extended discussion she would agree to go to [facility]. PT still recommending rehab. She is basically at baseline except her weakness. Review of Systems: A comprehensive review of systems was negative except for that written in the HPI. Objective:  
Physical Exam:  
Visit Vitals BP (!) 163/74 (BP 1 Location: Left arm, BP Patient Position: At rest) Pulse 89 Temp 97.3 °F (36.3 °C) Resp 16 Ht 5' 7\" (1.702 m) Wt 115.8 kg (255 lb 6.4 oz) SpO2 94% BMI 40.00 kg/m² O2 Flow Rate (L/min): 3 l/min O2 Device: Nasal cannula Temp (24hrs), Av °F (36.7 °C), Min:97.3 °F (36.3 °C), Max:98.3 °F (36.8 °C) No intake/output data recorded.  0701 -  1900 In: 240 [P.O.:240] Out: - General:  Alert, cooperative, no distress, appears stated age, moon-face. Head:  Normocephalic, without obvious abnormality, atraumatic. Eyes:  Conjunctivae/corneas clear. EOMs intact. Throat: Lips, mucosa, and tongue moist.  
Neck: Supple, symmetrical, trachea midline, no adenopathy, thyroid: no enlargement/tenderness/nodules, no carotid bruit and no JVD. Lungs:    diminished at bases, a few scattered rhonchi Chest wall:  No tenderness or deformity. Heart:   reg with rate in 80s at this time - monitor shows sinus. no murmur, click, rub or gallop. Abdomen:   Soft, non-tender. Bowel sounds normal. No masses,  No organomegaly. 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 Arms/hands much less swollen. No calf tenderness or cords. Pulses: 2+ and symmetric all extremities. Skin: Skin color, texture, turgor normal. No rashes Neurologic:   Alert and oriented X 3.  equal.  Gait not tested at this time. Sensation grossly normal to touch. Gross motor of extremities normal.    
 
Data Review:  
  Esophagogastroduodenoscopy (EGD) Colonoscopy Procedure Note Frank Gomez : 1945 [place] Record Number: [id number] 
  
  
Indications:    Iron deficiency anemia, diarrhea Referring Physician:  Marv Ness MD 
Anesthesia/Sedation: see nursing notes Endoscopist:  Dr. Wing Oseguera Assistants: None Procedure in Detail: After obtaining informed consent, positioning of the patient in the left lateral decubitus position, and conduction of a pre-procedure pause or \"time out\" the endoscope was introduced into the mouth and advanced to the duodenum. A careful inspection was made, and findings or interventions are described below. Findings:  
Esophagus:numerous white exudates Stomach: antral erythema with soft submucosal mass Normal body and fundus Duodenum/jejunum: erythema, erosions second into third portion Complications/estimated blood loss: none Therapies:  biopsy of stomach antrum at site submucosal mass 
biopsy of duodenal second portion, third portion Specimens: biopsies Implants:none Endoscopist:  Dr. Wing Oseguera Assistants: None Complications:  None Estimate Blood Loss:  None 
  
Colonoscopy Procedure in Detail: After obtaining informed consent, positioning of the patient in the left lateral decubitus position, and conduction of a pre-procedure pause or \"time out\" the endoscope was introduced into the anus and advanced to the terminal ileum. The quality of the colonic preparation was adequate. A careful inspection was made as the colonoscope was withdrawn, findings and interventions are described below. Appendiceal orifice photographed Findings: - Diverticulosis Two sessile 12 mm polyps Specimens:  
 polyps removed cold snare; random biopsies cecum, ascending Implants: none Complications:  
None; patient tolerated the procedure well. Estimated blood loss: none Impression: 
Candida esophagitis, gastritis, duodenitis, colon polyps, diverticulosis. Antral mass likely a benign lipoma Recommendations: - Await pathology. - anticoagulate tomorrow Resume diet; add creon, nystatin suspension Thank you for entrusting me with this patient's care. Please do not hesitate to contact me with any questions or if I can be of assistance with any of your other patients' GI needs. Signed By: Brook Renteria MD 
                      January 4, 2021 Port CXR 1/7/21 IMPRESSION: Unchanged left basilar airspace disease and small left pleural 
Effusion Recent Days: 
Recent Labs 01/07/21 
5235 01/06/21 
7821 WBC 14.3* 14.3* HGB 8.3* 8.5* HCT 29.1* 29.4*  
 295 Recent Labs 01/07/21 
1969 01/06/21 
4440 01/06/21 
7244  143  --   
K 4.6 3.4*  --   
 105  --   
CO2 34* 36*  --   
* 81  --   
BUN 18 18  --   
CREA 1.05* 0.98  --   
CA 7.7* 8.1*  --   
MG  --   --  2.3 ALB 2.3* 2.4*  --   
ALT 30 26  -- No results for input(s): PH, PCO2, PO2, HCO3, FIO2 in the last 72 hours. 24 Hour Results: 
Recent Results (from the past 24 hour(s)) GLUCOSE, POC Collection Time: 01/07/21  8:41 AM  
Result Value Ref Range Glucose (POC) 111 (H) 65 - 100 mg/dL Performed by [employer] GLUCOSE, POC Collection Time: 01/07/21  1:16 PM  
Result Value Ref Range Glucose (POC) 169 (H) 65 - 100 mg/dL Performed by [employer] GLUCOSE, POC Collection Time: 01/07/21  4:23 PM  
Result Value Ref Range Glucose (POC) 174 (H) 65 - 100 mg/dL Performed by Sergio Ridley (PCT) GLUCOSE, POC Collection Time: 01/07/21  9:53 PM  
Result Value Ref Range Glucose (POC) 219 (H) 65 - 100 mg/dL Performed by Fozia Harp GLUCOSE, POC Collection Time: 01/08/21  6:39 AM  
Result Value Ref Range Glucose (POC) 84 65 - 100 mg/dL Performed by Juan Chaparro Medications reviewed Current Facility-Administered Medications Medication Dose Route Frequency  albuterol-ipratropium (DUO-NEB) 2.5 MG-0.5 MG/3 ML  3 mL Nebulization Q4H RT  
 dilTIAZem ER (CARDIZEM CD) capsule 240 mg  240 mg Oral DAILY  ferrous sulfate tablet 325 mg  1 Tab Oral DAILY WITH BREAKFAST  loperamide (IMODIUM) capsule 2 mg  2 mg Oral BID  nystatin (MYCOSTATIN) 100,000 unit/mL oral suspension 500,000 Units  500,000 Units Oral QID  lipase-protease-amylase (CREON 24,000) capsule 1 Cap  1 Cap Oral TID WITH MEALS  
 apixaban (ELIQUIS) tablet 5 mg  5 mg Oral BID  predniSONE (DELTASONE) tablet 20 mg  20 mg Oral DAILY WITH BREAKFAST  phosphorus (K PHOS NEUTRAL) 250 mg tablet 1 Tab  1 Tab Oral BID  pantoprazole (PROTONIX) tablet 40 mg  40 mg Oral ACB  
 0.9% sodium chloride infusion 250 mL  250 mL IntraVENous PRN  
 0.9% sodium chloride infusion 250 mL  250 mL IntraVENous PRN  
 sodium chloride (NS) flush 5-10 mL  5-10 mL IntraVENous PRN  
 sodium chloride (NS) flush 5-40 mL  5-40 mL IntraVENous Q8H  
 sodium chloride (NS) flush 5-40 mL  5-40 mL IntraVENous PRN  
 acetaminophen (TYLENOL) tablet 650 mg  650 mg Oral Q6H PRN Or  
 acetaminophen (TYLENOL) suppository 650 mg  650 mg Rectal Q6H PRN  polyethylene glycol (MIRALAX) packet 17 g  17 g Oral DAILY PRN  promethazine (PHENERGAN) tablet 12.5 mg  12.5 mg Oral Q6H PRN Or  
 ondansetron (ZOFRAN) injection 4 mg  4 mg IntraVENous Q6H PRN  
 0.9% sodium chloride infusion 250 mL  250 mL IntraVENous PRN  
 glucose chewable tablet 16 g  4 Tab Oral PRN  
 dextrose (D50W) injection syrg 12.5-25 g  25-50 mL IntraVENous PRN  
 glucagon (GLUCAGEN) injection 1 mg  1 mg IntraMUSCular PRN  
 insulin glargine (LANTUS) injection 23 Units  0.2 Units/kg SubCUTAneous QHS  insulin lispro (HUMALOG) injection   SubCUTAneous QID WITH MEALS  
  cholecalciferol (VITAMIN D3) (1000 Units /25 mcg) tablet 2 Tab  2,000 Units Oral DAILY  cyanocobalamin (VITAMIN B12) tablet 1,000 mcg  1,000 mcg Oral DAILY  DULoxetine (CYMBALTA) capsule 60 mg  60 mg Oral DAILY  metoprolol tartrate (LOPRESSOR) tablet 25 mg  25 mg Oral DAILY PRN  
 mirtazapine (REMERON) tablet 15 mg  15 mg Oral QHS  montelukast (SINGULAIR) tablet 10 mg  10 mg Oral QPM  
 
Care Plan discussed with: Patient, GI and Nurse Total time spent with patient: 30 minutes.  
Oleg Terrell MD

## 2021-01-08 NOTE — PROGRESS NOTES
Physical Therapy 1127 pt received in bed, declining to participate with therapy at this time. Educated pt on importance of mobility with attempts for OOB to chair for improved breathing. Pt continued to decline without making eye contact. Total A for repositioning in bed and bed placed in chair position. Gurpreet Reddy

## 2021-01-09 NOTE — PROGRESS NOTES
Bedside shift change report given to  MADAI Donald (oncoming nurse) by Juan Hollis RN (offgoing nurse). Report included the following information SBAR, Kardex, MAR, Accordion and Recent Results.

## 2021-01-09 NOTE — PROGRESS NOTES
Bedside, Verbal and Written shift change report given to Jamila Polanco (oncoming nurse) by Dennie Jerry (offgoing nurse). Report included the following information SBAR, Kardex, ED Summary, Procedure Summary, Intake/Output, MAR, Recent Results and Med Rec Status.

## 2021-01-09 NOTE — PROGRESS NOTES
Daily Progress Note: 1/9/2021 Dyke Moritz Cherl Buster, MD 
 
Assessment/Plan:  
Left lower lobe pneumonia vrs localized pulmonary edema 
-Rocephin and azithromycin initially then off antibiotics per Pulmonary 
-Covid negative 
-pulmonology following 
  
Acute hypoxic respiratory failure 
-Continue with supplemental oxygen 
-Patient uses 3 L of oxygen at baseline Afib with RVR, fluid overloaded 
-dilt drip changed to po per Cards 
 
-monitor mag/phos/K+ and replete PRN 
-Cards DCed digoxin and resumed Eliquis 
-Consulted cardiology 
- lasix/diuretics per Cards 
  
Anemia - acute blood loss on chronic 
-Occult blood positive 
-Transfused 2 units PRBC so far -GI on board- endoscopies 1/4 as under Data Review - Colonic polys path revealed tubular adenomas - follow up pre GI 
  
Sepsis 
-Continue treatment of above 
- culture negative 
  
Elevated creatinine 
-monitor, treat as needed 
  
Hypertension 
-Continue with metoprolol 
  
Type 2 diabetes:  A1c 5.9 
-Patient takes Onglyza which we will hold 
-Continue sliding scale insulin  
-lantus halved to 10 
   
Anxiety/depression 
-Continue with Cymbalta and Remeron 
   
 
Problem List: 
Problem List as of 1/9/2021 Date Reviewed: 11/2/2020 Codes Class Noted - Resolved Left lower lobe pneumonia ICD-10-CM: J18.9 ICD-9-CM: 949  1/1/2021 - Present Leukocytosis ICD-10-CM: W50.335 ICD-9-CM: 288.60  11/3/2020 - Present Chronic respiratory failure with hypoxia [facility]) ICD-10-CM: J96.11 
ICD-9-CM: 518.83, 799.02  11/2/2020 - Present Overview Signed 11/2/2020 10:12 PM by Regi Fernandez MD  
  On 3L NC at home Cellulitis of lower extremity ICD-10-CM: L03.119 ICD-9-CM: 682.6  3/23/2020 - Present ASO (arteriosclerosis obliterans) ICD-10-CM: I70.90 ICD-9-CM: 440.9  9/5/2019 - Present PVD (peripheral vascular disease) ([facility] ICD-10-CM: I73.9 ICD-9-CM: 443.9  8/1/2019 - Present Severe obesity ([facility] ICD-10-CM: E66.01 
ICD-9-CM: 278.01  7/30/2019 - Present COPD (chronic obstructive pulmonary disease) (HCC) ICD-10-CM: J44.9 ICD-9-CM: 636  7/13/2019 - Present Normocytic anemia ICD-10-CM: D64.9 ICD-9-CM: 285.9  7/13/2019 - Present PAD (peripheral artery disease) (HCC) ICD-10-CM: I73.9 ICD-9-CM: 443.9  7/13/2019 - Present Mild intermittent asthma ICD-10-CM: J45.20 ICD-9-CM: 493.90  5/17/2019 - Present Brachial artery thrombus (HCC) ICD-10-CM: F35.2 ICD-9-CM: 444.21  4/26/2019 - Present Sleep apnea ICD-10-CM: G47.30 ICD-9-CM: 780.57  1/5/2019 - Present Overview Signed 1/5/2019  8:41 PM by Beto Encarnacion DO  
  wears CPAP Atrial fibrillation [facility]) ICD-10-CM: I48.91 
ICD-9-CM: 427.31  11/16/2018 - Present Obesity (BMI 30-39.9) (Chronic) ICD-10-CM: O50.2 ICD-9-CM: 278.00  11/13/2018 - Present Recurrent deep vein thrombosis (DVT) (HCC) ICD-10-CM: I82.409 ICD-9-CM: 453.40  3/30/2018 - Present Chronic anticoagulation (Chronic) ICD-10-CM: Z79.01 
ICD-9-CM: V58.61  3/30/2018 - Present Essential hypertension (Chronic) ICD-10-CM: I10 
ICD-9-CM: 401.9  1/22/2016 - Present CAD (coronary artery disease) ICD-10-CM: I25.10 ICD-9-CM: 414.00  10/9/2015 - Present Type II diabetes mellitus (HCC) (Chronic) ICD-10-CM: E11.9 ICD-9-CM: 250.00  10/9/2015 - Present RESOLVED: Syncope and collapse ICD-10-CM: R55 
ICD-9-CM: 780.2  9/29/2020 - 11/2/2020 RESOLVED: Cellulitis ICD-10-CM: L03.90 ICD-9-CM: 682.9  3/23/2020 - 5/29/2020 RESOLVED: Hypokalemia ICD-10-CM: E87.6 ICD-9-CM: 276.8  3/23/2020 - 5/29/2020 RESOLVED: Hyponatremia ICD-10-CM: E87.1 ICD-9-CM: 276.1  3/23/2020 - 5/29/2020 RESOLVED: Diarrhea ICD-10-CM: R19.7 ICD-9-CM: 787.91  3/23/2020 - 5/29/2020 RESOLVED: Syncope and collapse ICD-10-CM: R55 
ICD-9-CM: 780.2  7/13/2019 - 5/29/2020 RESOLVED: UTI (urinary tract infection) ICD-10-CM: N39.0 ICD-9-CM: 599.0  7/13/2019 - 11/2/2020 RESOLVED: Sepsis ([facility] ICD-10-CM: A41.9 ICD-9-CM: 038.9, 995.91  7/13/2019 - 11/3/2020 RESOLVED: Leg wound, left ICD-10-CM: Y70.561G ICD-9-CM: 891.0  7/13/2019 - 5/29/2020 RESOLVED: Leg laceration, right, initial encounter ICD-10-CM: M78.247R ICD-9-CM: 894.0  7/13/2019 - 5/29/2020 RESOLVED: Cellulitis of right upper arm ICD-10-CM: L03.113 ICD-9-CM: 682.3  5/17/2019 - 5/29/2020 RESOLVED: Gangrene of finger of right hand ([facility] ICD-10-CM: H47 
ICD-9-CM: 785.4  5/17/2019 - 11/2/2020 RESOLVED: Bowel obstruction ([facility] ICD-10-CM: R64.919 ICD-9-CM: 560.9  1/8/2019 - 5/29/2020 RESOLVED: Partial small bowel obstruction ([facility] ICD-10-CM: K56.600 ICD-9-CM: 560.9  1/5/2019 - 5/29/2020 RESOLVED: Dyspnea ICD-10-CM: R06.00 
ICD-9-CM: 786.09  11/13/2018 - 5/29/2020 RESOLVED: ARF (acute renal failure) (HCC) ICD-10-CM: N17.9 ICD-9-CM: 584.9  11/13/2018 - 5/29/2020 RESOLVED: Closed wedge compression fracture of T7 vertebra ([facility] ICD-10-CM: R64.175C ICD-9-CM: 805.2  11/13/2018 - 5/29/2020 RESOLVED: Type 2 diabetes with nephropathy ([facility] ICD-10-CM: E11.21 
ICD-9-CM: 250.40, 583.81  10/1/2018 - 11/2/2020 RESOLVED: Chest pain ICD-10-CM: R07.9 ICD-9-CM: 786.50  6/27/2018 - 5/29/2020 RESOLVED: Abdominal pain ICD-10-CM: R10.9 ICD-9-CM: 789.00  6/27/2018 - 5/29/2020 RESOLVED: Coronary artery disease involving native coronary artery without angina pectoris (Chronic) ICD-10-CM: I25.10 ICD-9-CM: 414.01  1/22/2016 - 11/2/2020 RESOLVED: HTN (hypertension) ICD-10-CM: I10 
ICD-9-CM: 401.9  10/9/2015 - 1/22/2016 RESOLVED: NSTEMI (non-ST elevated myocardial infarction) ([facility] ICD-10-CM: I21.4 ICD-9-CM: 410.70  10/9/2015 - 5/29/2020 Subjective: Alistair Ware is a [address] y.o. female who presented with shortness of breath which has been progressively worsening since this weekend. She reports having by cough and sinus congestion. Symptoms acutely worsened this morning. She denies any chest pain, abdominal pain, diarrhea, or fever. She has felt chills. Upon arrival of EMS she was found to have saturations in the 70s. She has a history of chronic COPD and uses 3 L of oxygen at baseline. [Dr Prashant Black 1/2: Got 2 units PRBCs, but follow up labs unable to be obtained for the past 12 hours. I spoke with anesthesia and they will access her for central line. Pt tolerated PO, +BM yesterday. Says her legs are no more swollen than her baseline. 1/3: Sugar was in 50s before bed last night. Held bedtime humalog and halved lantus. Sugars better this AM.  Now in Afib with RVR. Responded only briefly to dilt push. Pt feels the tachycardia. Denies increased SOB or CP. Tolerated PO yesterday. Due for bowel prep today, but on hold until cardiovascularly more stable. 1/4:  She reports she feels better this AM.  Currently in sinus rhythm at this moment. She is going to endoscopy this AM.  Hb up to 8.6. WBC 14.4. Will decrease Prednisone to 20mg per day. 1/5:  Continues to feel better but still very fatigued. Remains in sinus rhythm at this moment. Upper and lower endoscopies as under Data Review. Eliquis resumed. 1/6:  About the same with no specific complaints except \"feel weak all over. \"  Back in A Fib but rate ok in 90s to low 100s. Hb stable at 8.5. She is willing to go to rehab so will get Case Management to see. 1/7:  No specific complaints. Hb 8.3. Cards reported irreg rate more of PACs than A fib at this time. Repeat CXR today. Stable for rehab when bed found. Tubular adenomas on colon polyp path - follow up per GI. :  No specific complaints. Still very weak, cannot get up on her own and I do not think she can manage at assisted living without 24/7 care at this point. CXR yesterday without progression. She declined rehab yesterday but after extended discussion she would agree to go to [facility]. PT still recommending rehab. She is basically at baseline except her weakness. :  She reports she is feeling better. She wants to try to get up to chair today. Hb down from 9.0 to 7.2 this AM - recheck later today and transfuse if needed. Review of Systems: A comprehensive review of systems was negative except for that written in the HPI. Objective:  
Physical Exam:  
Visit Vitals /67 (BP 1 Location: Left arm, BP Patient Position: At rest) Pulse 93 Temp 98.8 °F (37.1 °C) Resp 18 Ht 5' 7\" (1.702 m) Wt 115.7 kg (255 lb) SpO2 95% BMI 39.94 kg/m² O2 Flow Rate (L/min): 3 l/min O2 Device: Nasal cannula Temp (24hrs), Av.4 °F (36.9 °C), Min:97.6 °F (36.4 °C), Max:99.2 °F (37.3 °C) No intake/output data recorded.  1901 -  0700 In: 200 [P.O.:200] Out: - General:  Alert, cooperative, no distress, appears stated age, moon-face. Head:  Normocephalic, without obvious abnormality, atraumatic. Eyes:  Conjunctivae/corneas clear. EOMs intact. Throat: Lips, mucosa, and tongue moist.  
Neck: Supple, symmetrical, trachea midline, no adenopathy, thyroid: no enlargement/tenderness/nodules, no carotid bruit and no JVD. Lungs:    diminished at bases, a few scattered rhonchi Chest wall:  No tenderness or deformity. Heart:   reg with rate in 80s at this time - monitor shows sinus. no murmur, click, rub or gallop. Abdomen:   Soft, non-tender. Bowel sounds normal. No masses,  No organomegaly. 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 Arms/hands much less swollen. No calf tenderness or cords. Pulses: 2+ and symmetric all extremities. Skin: Skin color, texture, turgor normal. No rashes Neurologic:   Alert and oriented X 3.  equal.  Gait not tested at this time. Sensation grossly normal to touch. Gross motor of extremities normal.    
 
Data Review:  
  Esophagogastroduodenoscopy (EGD) Colonoscopy Procedure Note Pernell Carter : 1945 [address] [place] [address] Record Number: [id number] 
  
  
Indications:    Iron deficiency anemia, diarrhea Referring Physician:  Candis Ruiz MD 
Anesthesia/Sedation: see nursing notes Endoscopist:  Dr. Swati Anaya Assistants: None Procedure in Detail: After obtaining informed consent, positioning of the patient in the left lateral decubitus position, and conduction of a pre-procedure pause or \"time out\" the endoscope was introduced into the mouth and advanced to the duodenum. A careful inspection was made, and findings or interventions are described below. Findings:  
Esophagus:numerous white exudates Stomach: antral erythema with soft submucosal mass Normal body and fundus Duodenum/jejunum: erythema, erosions second into third portion Complications/estimated blood loss: none Therapies:  biopsy of stomach antrum at site submucosal mass 
biopsy of duodenal second portion, third portion Specimens: biopsies Implants:none Endoscopist:  Dr. Swati Anaya Assistants: None Complications:  None Estimate Blood Loss:  None 
  
Colonoscopy Procedure in Detail: After obtaining informed consent, positioning of the patient in the left lateral decubitus position, and conduction of a pre-procedure pause or \"time out\" the endoscope was introduced into the anus and advanced to the terminal ileum. The quality of the colonic preparation was adequate. A careful inspection was made as the colonoscope was withdrawn, findings and interventions are described below. Appendiceal orifice photographed Findings: - Diverticulosis Two sessile 12 mm polyps Specimens:  
 polyps removed cold snare; random biopsies cecum, ascending Implants: none Complications:  
None; patient tolerated the procedure well. Estimated blood loss: none Impression: 
Candida esophagitis, gastritis, duodenitis, colon polyps, diverticulosis. Antral mass likely a benign lipoma Recommendations: - Await pathology. - anticoagulate tomorrow Resume diet; add creon, nystatin suspension Thank you for entrusting me with this patient's care. Please do not hesitate to contact me with any questions or if I can be of assistance with any of your other patients' GI needs. Signed By: Zee Dykes MD 
                      January 4, 2021 Port CXR 1/7/21 IMPRESSION: Unchanged left basilar airspace disease and small left pleural 
Effusion Recent Days: 
Recent Labs 01/09/21 
3996 01/08/21 
9976 01/07/21 
2288 WBC 14.6* 15.3* 14.3* HGB 7.2* 9.0* 8.3* HCT 25.4* 31.6* 29.1*  
 284 267 Recent Labs 01/09/21 
2954 01/08/21 
6427 01/07/21 
9706  143 144  
K 3.6 3.7 4.6  106 108 CO2 32 35* 34* GLU 86 85 163* BUN 18 19 18 CREA 0.78 0.98 1.05* CA 7.4* 8.2* 7.7* ALB 2.0* 2.5* 2.3* ALT 30 34 30 No results for input(s): PH, PCO2, PO2, HCO3, FIO2 in the last 72 hours. 24 Hour Results: 
Recent Results (from the past 24 hour(s)) CBC WITH AUTOMATED DIFF Collection Time: 01/08/21  7:58 AM  
Result Value Ref Range WBC 15.3 (H) 3.6 - 11.0 K/uL RBC 3.33 (L) 3.80 - 5.20 M/uL HGB 9.0 (L) 11.5 - 16.0 g/dL HCT 31.6 (L) 35.0 - 47.0 % MCV 94.9 80.0 - 99.0 FL  
 MCH 27.0 26.0 - 34.0 PG  
 MCHC 28.5 (L) 30.0 - 36.5 g/dL RDW 21.7 (H) 11.5 - 14.5 % PLATELET 780 083 - 524 K/uL MPV 10.3 8.9 - 12.9 FL  
 NRBC 0.0 0  WBC ABSOLUTE NRBC 0.00 0.00 - 0.01 K/uL NEUTROPHILS 78 (H) 32 - 75 % LYMPHOCYTES 12 12 - 49 % MONOCYTES 7 5 - 13 % EOSINOPHILS 2 0 - 7 % BASOPHILS 0 0 - 1 % IMMATURE GRANULOCYTES 1 (H) 0.0 - 0.5 % ABS. NEUTROPHILS 11.9 (H) 1.8 - 8.0 K/UL  
 ABS. LYMPHOCYTES 1.8 0.8 - 3.5 K/UL  
 ABS. MONOCYTES 1.1 (H) 0.0 - 1.0 K/UL  
 ABS. EOSINOPHILS 0.3 0.0 - 0.4 K/UL  
 ABS. BASOPHILS 0.0 0.0 - 0.1 K/UL  
 ABS. IMM. GRANS. 0.2 (H) 0.00 - 0.04 K/UL  
 DF SMEAR SCANNED    
 PLATELET COMMENTS Large Platelets RBC COMMENTS ANISOCYTOSIS 1+ 
    
 RBC COMMENTS POLYCHROMASIA 1+ WBC COMMENTS TOXIC GRANULATION    
METABOLIC PANEL, COMPREHENSIVE Collection Time: 01/08/21  7:58 AM  
Result Value Ref Range Sodium 143 136 - 145 mmol/L Potassium 3.7 3.5 - 5.1 mmol/L Chloride 106 97 - 108 mmol/L  
 CO2 35 (H) 21 - 32 mmol/L Anion gap 2 (L) 5 - 15 mmol/L Glucose 85 65 - 100 mg/dL BUN 19 6 - 20 MG/DL Creatinine 0.98 0.55 - 1.02 MG/DL  
 BUN/Creatinine ratio 19 12 - 20 GFR est AA >60 >60 ml/min/1.73m2 GFR est non-AA 56 (L) >60 ml/min/1.73m2 Calcium 8.2 (L) 8.5 - 10.1 MG/DL Bilirubin, total 0.9 0.2 - 1.0 MG/DL  
 ALT (SGPT) 34 12 - 78 U/L  
 AST (SGOT) 33 15 - 37 U/L Alk. phosphatase 227 (H) 45 - 117 U/L Protein, total 5.4 (L) 6.4 - 8.2 g/dL Albumin 2.5 (L) 3.5 - 5.0 g/dL Globulin 2.9 2.0 - 4.0 g/dL A-G Ratio 0.9 (L) 1.1 - 2.2 SAMPLES BEING HELD Collection Time: 01/08/21  7:58 AM  
Result Value Ref Range SAMPLES BEING HELD 1SST COMMENT Add-on orders for these samples will be processed based on acceptable specimen integrity and analyte stability, which may vary by analyte. GLUCOSE, POC Collection Time: 01/08/21 11:44 AM  
Result Value Ref Range Glucose (POC) 56 (L) 65 - 100 mg/dL Performed by Enigmatec Kidney GLUCOSE, POC Collection Time: 01/08/21 12:01 PM  
Result Value Ref Range Glucose (POC) 47 (LL) 65 - 100 mg/dL Performed by Branda Kidney GLUCOSE, POC Collection Time: 01/08/21 12:23 PM  
Result Value Ref Range Glucose (POC) 87 65 - 100 mg/dL Performed by Branda Kidney GLUCOSE, POC Collection Time: 01/08/21  3:15 PM  
Result Value Ref Range Glucose (POC) 209 (H) 65 - 100 mg/dL Performed by Enigmatec Kidney SARS-COV-2 Collection Time: 01/08/21  7:15 PM  
Result Value Ref Range Specimen source Nasopharyngeal    
 SARS-CoV-2 PENDING   
 SARS-CoV-2 PENDING Specimen source Nasopharyngeal    
 COVID-19 rapid test PENDING Specimen type NP Swab Health status PENDING   
 COVID-19 PENDING   
GLUCOSE, POC Collection Time: 01/08/21  9:01 PM  
Result Value Ref Range Glucose (POC) 246 (H) 65 - 100 mg/dL Performed by [place] (PCT) CBC WITH AUTOMATED DIFF Collection Time: 01/09/21  5:40 AM  
Result Value Ref Range WBC 14.6 (H) 3.6 - 11.0 K/uL  
 RBC 2.67 (L) 3.80 - 5.20 M/uL HGB 7.2 (L) 11.5 - 16.0 g/dL HCT 25.4 (L) 35.0 - 47.0 % MCV 95.1 80.0 - 99.0 FL  
 MCH 27.0 26.0 - 34.0 PG  
 MCHC 28.3 (L) 30.0 - 36.5 g/dL RDW 21.6 (H) 11.5 - 14.5 % PLATELET 505 414 - 967 K/uL MPV 10.4 8.9 - 12.9 FL  
 NRBC 0.0 0  WBC ABSOLUTE NRBC 0.00 0.00 - 0.01 K/uL NEUTROPHILS PENDING % LYMPHOCYTES PENDING % MONOCYTES PENDING % EOSINOPHILS PENDING % BASOPHILS PENDING % IMMATURE GRANULOCYTES PENDING %  
 ABS. NEUTROPHILS PENDING K/UL  
 ABS. LYMPHOCYTES PENDING K/UL  
 ABS. MONOCYTES PENDING K/UL  
 ABS. EOSINOPHILS PENDING K/UL ABS. BASOPHILS PENDING K/UL  
 ABS. IMM. GRANS. PENDING K/UL  
 DF PENDING   
METABOLIC PANEL, COMPREHENSIVE Collection Time: 01/09/21  5:40 AM  
Result Value Ref Range Sodium 143 136 - 145 mmol/L Potassium 3.6 3.5 - 5.1 mmol/L Chloride 108 97 - 108 mmol/L  
 CO2 32 21 - 32 mmol/L Anion gap 3 (L) 5 - 15 mmol/L Glucose 86 65 - 100 mg/dL BUN 18 6 - 20 MG/DL Creatinine 0.78 0.55 - 1.02 MG/DL  
 BUN/Creatinine ratio 23 (H) 12 - 20 GFR est AA >60 >60 ml/min/1.73m2 GFR est non-AA >60 >60 ml/min/1.73m2 Calcium 7.4 (L) 8.5 - 10.1 MG/DL Bilirubin, total 0.5 0.2 - 1.0 MG/DL  
 ALT (SGPT) 30 12 - 78 U/L  
 AST (SGOT) 24 15 - 37 U/L Alk. phosphatase 182 (H) 45 - 117 U/L Protein, total 4.6 (L) 6.4 - 8.2 g/dL Albumin 2.0 (L) 3.5 - 5.0 g/dL Globulin 2.6 2.0 - 4.0 g/dL A-G Ratio 0.8 (L) 1.1 - 2.2 GLUCOSE, POC Collection Time: 01/09/21  6:14 AM  
Result Value Ref Range Glucose (POC) 98 65 - 100 mg/dL Performed by Ruthie Proctor Medications reviewed Current Facility-Administered Medications Medication Dose Route Frequency  insulin glargine (LANTUS) injection 18 Units  18 Units SubCUTAneous QHS  albuterol-ipratropium (DUO-NEB) 2.5 MG-0.5 MG/3 ML  3 mL Nebulization Q4H RT  
 dilTIAZem ER (CARDIZEM CD) capsule 240 mg  240 mg Oral DAILY  ferrous sulfate tablet 325 mg  1 Tab Oral DAILY WITH BREAKFAST  loperamide (IMODIUM) capsule 2 mg  2 mg Oral BID  nystatin (MYCOSTATIN) 100,000 unit/mL oral suspension 500,000 Units  500,000 Units Oral QID  lipase-protease-amylase (CREON 24,000) capsule 1 Cap  1 Cap Oral TID WITH MEALS  
 apixaban (ELIQUIS) tablet 5 mg  5 mg Oral BID  predniSONE (DELTASONE) tablet 20 mg  20 mg Oral DAILY WITH BREAKFAST  phosphorus (K PHOS NEUTRAL) 250 mg tablet 1 Tab  1 Tab Oral BID  pantoprazole (PROTONIX) tablet 40 mg  40 mg Oral ACB  
 0.9% sodium chloride infusion 250 mL  250 mL IntraVENous PRN  
  0.9% sodium chloride infusion 250 mL  250 mL IntraVENous PRN  
 sodium chloride (NS) flush 5-10 mL  5-10 mL IntraVENous PRN  
 sodium chloride (NS) flush 5-40 mL  5-40 mL IntraVENous Q8H  
 sodium chloride (NS) flush 5-40 mL  5-40 mL IntraVENous PRN  
 acetaminophen (TYLENOL) tablet 650 mg  650 mg Oral Q6H PRN Or  
 acetaminophen (TYLENOL) suppository 650 mg  650 mg Rectal Q6H PRN  polyethylene glycol (MIRALAX) packet 17 g  17 g Oral DAILY PRN  promethazine (PHENERGAN) tablet 12.5 mg  12.5 mg Oral Q6H PRN Or  
 ondansetron (ZOFRAN) injection 4 mg  4 mg IntraVENous Q6H PRN  
 0.9% sodium chloride infusion 250 mL  250 mL IntraVENous PRN  
 glucose chewable tablet 16 g  4 Tab Oral PRN  
 dextrose (D50W) injection syrg 12.5-25 g  25-50 mL IntraVENous PRN  
 glucagon (GLUCAGEN) injection 1 mg  1 mg IntraMUSCular PRN  
 insulin lispro (HUMALOG) injection   SubCUTAneous QID WITH MEALS  cholecalciferol (VITAMIN D3) (1000 Units /25 mcg) tablet 2 Tab  2,000 Units Oral DAILY  cyanocobalamin (VITAMIN B12) tablet 1,000 mcg  1,000 mcg Oral DAILY  DULoxetine (CYMBALTA) capsule 60 mg  60 mg Oral DAILY  metoprolol tartrate (LOPRESSOR) tablet 25 mg  25 mg Oral DAILY PRN  
 mirtazapine (REMERON) tablet 15 mg  15 mg Oral QHS  montelukast (SINGULAIR) tablet 10 mg  10 mg Oral QPM  
 
Care Plan discussed with: Patient, GI and Nurse Total time spent with patient: 30 minutes.  
Negar Johnson MD

## 2021-01-09 NOTE — PROGRESS NOTES
HYPOGLYCEMIC EPISODE DOCUMENTATION Patient with hypoglycemic episode(s) at 1144(time) on 01/08/21(date). BG value(s) pre-treatment 56 Was patient symptomatic? [x] yes, [] no 
Patient was treated with the following rescue medications/treatments: [] D50 [] Glucose tablets 
              [] Glucagon 
              [x] 4oz juice 
              [] 6oz reg soda 
              [] 8oz low fat milk BG value post-treatment: below 46/ unreadable RN gave glucose tablets and rechecked after 15 minutes BG was 86 Once BG treated and value greater than 80mg/dl, pt was provided with the following: 
[x] snack 
[] meal

## 2021-01-09 NOTE — PROGRESS NOTES
Problem: Falls - Risk of 
Goal: *Absence of Falls Description: Document Green Salvia Fall Risk and appropriate interventions in the flowsheet. Outcome: Progressing Towards Goal 
Note: Fall Risk Interventions: 
Mobility Interventions: Bed/chair exit alarm Medication Interventions: Patient to call before getting OOB Elimination Interventions: Call light in reach History of Falls Interventions: Bed/chair exit alarm Problem: Patient Education: Go to Patient Education Activity Goal: Patient/Family Education Outcome: Progressing Towards Goal 
  
Problem: Pressure Injury - Risk of 
Goal: *Prevention of pressure injury Description: Document Darrion Scale and appropriate interventions in the flowsheet. Outcome: Progressing Towards Goal 
Note: Pressure Injury Interventions: 
Sensory Interventions: Assess need for specialty bed, Float heels Moisture Interventions: Absorbent underpads Activity Interventions: Increase time out of bed Mobility Interventions: Pressure redistribution bed/mattress (bed type) Nutrition Interventions: Document food/fluid/supplement intake Friction and Shear Interventions: Apply protective barrier, creams and emollients

## 2021-01-10 NOTE — PROGRESS NOTES
Bedside, Verbal and Written shift change report given to Jamila Polanco (oncoming nurse) by Ced Diaz (offgoing nurse). Report included the following information SBAR, Kardex, ED Summary, Procedure Summary, Intake/Output, MAR, Recent Results and Med Rec Status.

## 2021-01-10 NOTE — PROGRESS NOTES
Patient pulled out her EJ when tech went into room blood was all over her called Dr. Han Guess he stated will take care of it when he gets here. Will try and get morning labs

## 2021-01-10 NOTE — PROGRESS NOTES
Patient stated the EJ was bothering her so she pulled it out. Tried to get morning labs stuck her 2x was not able to get labs

## 2021-01-10 NOTE — PROGRESS NOTES
Bedside and Verbal shift change report given to Shabana RN (oncoming nurse) by Vilma Betancur RN (offgoing nurse). Report included the following information SBAR, Kardex, Intake/Output, MAR, Accordion and Recent Results.

## 2021-01-10 NOTE — PROGRESS NOTES
Daily Progress Note: 1/10/2021 
Shubham Parks MD 
 
Assessment/Plan:  
Left lower lobe pneumonia vrs localized pulmonary edema 
-Rocephin and azithromycin initially then off antibiotics per Pulmonary 
-Covid negative 
-pulmonology following 
  
Acute hypoxic respiratory failure 
-Continue with supplemental oxygen 
-Patient uses 3 L of oxygen at baseline 
 
Afib with RVR, fluid overloaded 
-dilt drip changed to po per Cards 
 
-monitor mag/phos/K+ and replete PRN 
-Cards DCed digoxin and resumed Eliquis 
-Consulted cardiology 
- lasix/diuretics per Cards 
  
Anemia - acute blood loss on chronic 
-Occult blood positive 
-Transfused 2 units PRBC so far 
-GI on board- endoscopies 1/4 as under Data Review 
- Colonic polys path revealed tubular adenomas - follow up pre GI 
  
Sepsis 
-Continue treatment of above 
- culture negative 
  
Elevated creatinine 
-monitor, treat as needed 
  
Hypertension 
-Continue with metoprolol 
  
Type 2 diabetes:  A1c 5.9 
-Patient takes Onglyza which we will hold 
-Continue sliding scale insulin  
-lantus halved to 10 
   
Anxiety/depression 
-Continue with Cymbalta and Remeron 
   
 
Problem List: 
Problem List as of 1/10/2021 Date Reviewed: 11/2/2020  
       Codes Class Noted - Resolved  
 Left lower lobe pneumonia ICD-10-CM: J18.9 
ICD-9-CM: 486  1/1/2021 - Present  
   
 Leukocytosis ICD-10-CM: D72.829 
ICD-9-CM: 288.60  11/3/2020 - Present  
   
 Chronic respiratory failure with hypoxia (HCC) ICD-10-CM: J96.11 
ICD-9-CM: 518.83, 799.02  11/2/2020 - Present  
 Overview Signed 11/2/2020 10:12 PM by Li Lam MD  
  On 3L NC at home  
  
  
   
 Cellulitis of lower extremity ICD-10-CM: L03.119 
ICD-9-CM: 682.6  3/23/2020 - Present  
   
 ASO (arteriosclerosis obliterans) ICD-10-CM: I70.90 
ICD-9-CM: 440.9  9/5/2019 - Present  
   
 PVD (peripheral vascular disease) (HCC) ICD-10-CM: I73.9 
ICD-9-CM: 443.9  8/1/2019 - Present  
   
 Severe obesity ([facility] ICD-10-CM: E66.01 
ICD-9-CM: 278.01  7/30/2019 - Present COPD (chronic obstructive pulmonary disease) (HCC) ICD-10-CM: J44.9 ICD-9-CM: 006  7/13/2019 - Present Normocytic anemia ICD-10-CM: D64.9 ICD-9-CM: 285.9  7/13/2019 - Present PAD (peripheral artery disease) (HCC) ICD-10-CM: I73.9 ICD-9-CM: 443.9  7/13/2019 - Present Mild intermittent asthma ICD-10-CM: J45.20 ICD-9-CM: 493.90  5/17/2019 - Present Brachial artery thrombus (HCC) ICD-10-CM: J88.8 ICD-9-CM: 444.21  4/26/2019 - Present Sleep apnea ICD-10-CM: G47.30 ICD-9-CM: 780.57  1/5/2019 - Present Overview Signed 1/5/2019  8:41 PM by Luis Enrique Joseph DO  
  wears CPAP Atrial fibrillation [facility]) ICD-10-CM: I48.91 
ICD-9-CM: 427.31  11/16/2018 - Present Obesity (BMI 30-39.9) (Chronic) ICD-10-CM: Y65.6 ICD-9-CM: 278.00  11/13/2018 - Present Recurrent deep vein thrombosis (DVT) (HCC) ICD-10-CM: I82.409 ICD-9-CM: 453.40  3/30/2018 - Present Chronic anticoagulation (Chronic) ICD-10-CM: Z79.01 
ICD-9-CM: V58.61  3/30/2018 - Present Essential hypertension (Chronic) ICD-10-CM: I10 
ICD-9-CM: 401.9  1/22/2016 - Present CAD (coronary artery disease) ICD-10-CM: I25.10 ICD-9-CM: 414.00  10/9/2015 - Present Type II diabetes mellitus (HCC) (Chronic) ICD-10-CM: E11.9 ICD-9-CM: 250.00  10/9/2015 - Present RESOLVED: Syncope and collapse ICD-10-CM: R55 
ICD-9-CM: 780.2  9/29/2020 - 11/2/2020 RESOLVED: Cellulitis ICD-10-CM: L03.90 ICD-9-CM: 682.9  3/23/2020 - 5/29/2020 RESOLVED: Hypokalemia ICD-10-CM: E87.6 ICD-9-CM: 276.8  3/23/2020 - 5/29/2020 RESOLVED: Hyponatremia ICD-10-CM: E87.1 ICD-9-CM: 276.1  3/23/2020 - 5/29/2020 RESOLVED: Diarrhea ICD-10-CM: R19.7 ICD-9-CM: 787.91  3/23/2020 - 5/29/2020 RESOLVED: Syncope and collapse ICD-10-CM: R55 
ICD-9-CM: 780.2  7/13/2019 - 5/29/2020 RESOLVED: UTI (urinary tract infection) ICD-10-CM: N39.0 ICD-9-CM: 599.0  7/13/2019 - 11/2/2020 RESOLVED: Sepsis ([facility] ICD-10-CM: A41.9 ICD-9-CM: 038.9, 995.91  7/13/2019 - 11/3/2020 RESOLVED: Leg wound, left ICD-10-CM: N58.283Q ICD-9-CM: 891.0  7/13/2019 - 5/29/2020 RESOLVED: Leg laceration, right, initial encounter ICD-10-CM: G04.965Z ICD-9-CM: 894.0  7/13/2019 - 5/29/2020 RESOLVED: Cellulitis of right upper arm ICD-10-CM: L03.113 ICD-9-CM: 682.3  5/17/2019 - 5/29/2020 RESOLVED: Gangrene of finger of right hand ([facility] ICD-10-CM: Q73 
ICD-9-CM: 785.4  5/17/2019 - 11/2/2020 RESOLVED: Bowel obstruction ([facility] ICD-10-CM: D23.040 ICD-9-CM: 560.9  1/8/2019 - 5/29/2020 RESOLVED: Partial small bowel obstruction ([facility] ICD-10-CM: K56.600 ICD-9-CM: 560.9  1/5/2019 - 5/29/2020 RESOLVED: Dyspnea ICD-10-CM: R06.00 
ICD-9-CM: 786.09  11/13/2018 - 5/29/2020 RESOLVED: ARF (acute renal failure) (HCC) ICD-10-CM: N17.9 ICD-9-CM: 584.9  11/13/2018 - 5/29/2020 RESOLVED: Closed wedge compression fracture of T7 vertebra ([facility] ICD-10-CM: T49.300Q ICD-9-CM: 805.2  11/13/2018 - 5/29/2020 RESOLVED: Type 2 diabetes with nephropathy ([facility] ICD-10-CM: E11.21 
ICD-9-CM: 250.40, 583.81  10/1/2018 - 11/2/2020 RESOLVED: Chest pain ICD-10-CM: R07.9 ICD-9-CM: 786.50  6/27/2018 - 5/29/2020 RESOLVED: Abdominal pain ICD-10-CM: R10.9 ICD-9-CM: 789.00  6/27/2018 - 5/29/2020 RESOLVED: Coronary artery disease involving native coronary artery without angina pectoris (Chronic) ICD-10-CM: I25.10 ICD-9-CM: 414.01  1/22/2016 - 11/2/2020 RESOLVED: HTN (hypertension) ICD-10-CM: I10 
ICD-9-CM: 401.9  10/9/2015 - 1/22/2016 RESOLVED: NSTEMI (non-ST elevated myocardial infarction) ([facility] ICD-10-CM: I21.4 ICD-9-CM: 410.70  10/9/2015 - 5/29/2020 Subjective: Denisa Chadwick is a 70 y.o. female who presented with shortness of breath which has been progressively worsening since this weekend. She reports having by cough and sinus congestion. Symptoms acutely worsened this morning. She denies any chest pain, abdominal pain, diarrhea, or fever. She has felt chills. Upon arrival of EMS she was found to have saturations in the 70s. She has a history of chronic COPD and uses 3 L of oxygen at baseline. [Dr Emma Kirby 1/2: Got 2 units PRBCs, but follow up labs unable to be obtained for the past 12 hours. I spoke with anesthesia and they will access her for central line. Pt tolerated PO, +BM yesterday. Says her legs are no more swollen than her baseline. 1/3: Sugar was in 50s before bed last night. Held bedtime humalog and halved lantus. Sugars better this AM.  Now in Afib with RVR. Responded only briefly to dilt push. Pt feels the tachycardia. Denies increased SOB or CP. Tolerated PO yesterday. Due for bowel prep today, but on hold until cardiovascularly more stable. 1/4:  She reports she feels better this AM.  Currently in sinus rhythm at this moment. She is going to endoscopy this AM.  Hb up to 8.6. WBC 14.4. Will decrease Prednisone to 20mg per day. 1/5:  Continues to feel better but still very fatigued. Remains in sinus rhythm at this moment. Upper and lower endoscopies as under Data Review. Eliquis resumed. 1/6:  About the same with no specific complaints except \"feel weak all over. \"  Back in A Fib but rate ok in 90s to low 100s. Hb stable at 8.5. She is willing to go to rehab so will get Case Management to see. 1/7:  No specific complaints. Hb 8.3. Cards reported irreg rate more of PACs than A fib at this time. Repeat CXR today. Stable for rehab when bed found. Tubular adenomas on colon polyp path - follow up per GI. :  No specific complaints. Still very weak, cannot get up on her own and I do not think she can manage at assisted living without 24/7 care at this point. CXR yesterday without progression. She declined rehab yesterday but after extended discussion she would agree to go to [facility]. PT still recommending rehab. She is basically at baseline except her weakness. :  She reports she is feeling better. She wants to try to get up to chair today. Hb down from 9.0 to 7.2 this AM - recheck later today and transfuse if needed. 1/10:  Other than fatigue and weakness she reports no complaints. She sat up on edge of bed yesterday. Pulled IJ out last night and nurses report unable to get labs. Pt requests no labs unless absolutely necessary. Recheck of Hb yesterday PM was 8.9 so will hold off on labs for now. Stable for rehab. Review of Systems: A comprehensive review of systems was negative except for that written in the HPI. Objective:  
Physical Exam:  
Visit Vitals /65 (BP 1 Location: Left arm, BP Patient Position: At rest) Pulse 93 Temp 97.4 °F (36.3 °C) Resp 18 Ht 5' 7\" (1.702 m) Wt 116.6 kg (257 lb 0.9 oz) SpO2 100% BMI 40.26 kg/m² O2 Flow Rate (L/min): 2 l/min O2 Device: Nasal cannula Temp (24hrs), Av.1 °F (36.7 °C), Min:97.4 °F (36.3 °C), Max:98.5 °F (36.9 °C) No intake/output data recorded.  1901 - 01/10 0700 In: 240 [P.O.:240] Out: - General:  Alert, cooperative, no distress, appears stated age, moon-face. Head:  Normocephalic, without obvious abnormality, atraumatic. Eyes:  Conjunctivae/corneas clear. EOMs intact. Throat: Lips, mucosa, and tongue moist.  
Neck: Supple, symmetrical, trachea midline, no adenopathy, thyroid: no enlargement/tenderness/nodules, no carotid bruit and no JVD. Lungs:    diminished at bases, a few scattered rhonchi Chest wall:  No tenderness or deformity. Heart:   reg with rate in 80s at this time - monitor shows sinus. no murmur, click, rub or gallop. Abdomen:   Soft, non-tender. Bowel sounds normal. No masses,  No organomegaly. 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 Arms/hands much less swollen. No calf tenderness or cords. Pulses: 2+ and symmetric all extremities. Skin: Skin color, texture, turgor normal. No rashes Neurologic:   Alert and oriented X 3.  equal.  Gait not tested at this time. Sensation grossly normal to touch. Gross motor of extremities normal.    
 
Data Review:  
  Esophagogastroduodenoscopy (EGD) Colonoscopy Procedure Note Pernell Carter : 1945 [address] [place] [address] Record Number: [id number] 
  
  
Indications:    Iron deficiency anemia, diarrhea Referring Physician:  Candis Ruiz MD 
Anesthesia/Sedation: see nursing notes Endoscopist:  Dr. Swati Anaya Assistants: None Procedure in Detail: After obtaining informed consent, positioning of the patient in the left lateral decubitus position, and conduction of a pre-procedure pause or \"time out\" the endoscope was introduced into the mouth and advanced to the duodenum. A careful inspection was made, and findings or interventions are described below. Findings:  
Esophagus:numerous white exudates Stomach: antral erythema with soft submucosal mass Normal body and fundus Duodenum/jejunum: erythema, erosions second into third portion Complications/estimated blood loss: none Therapies:  biopsy of stomach antrum at site submucosal mass 
biopsy of duodenal second portion, third portion Specimens: biopsies Implants:none Endoscopist:  Dr. Swati Anaya Assistants: None Complications:  None Estimate Blood Loss:  None 
  
Colonoscopy Procedure in Detail: After obtaining informed consent, positioning of the patient in the left lateral decubitus position, and conduction of a pre-procedure pause or \"time out\" the endoscope was introduced into the anus and advanced to the terminal ileum. The quality of the colonic preparation was adequate. A careful inspection was made as the colonoscope was withdrawn, findings and interventions are described below. Appendiceal orifice photographed Findings: - Diverticulosis Two sessile 12 mm polyps Specimens:  
 polyps removed cold snare; random biopsies cecum, ascending Implants: none Complications:  
None; patient tolerated the procedure well. Estimated blood loss: none Impression: 
Candida esophagitis, gastritis, duodenitis, colon polyps, diverticulosis. Antral mass likely a benign lipoma Recommendations: - Await pathology. - anticoagulate tomorrow Resume diet; add creon, nystatin suspension Thank you for entrusting me with this patient's care. Please do not hesitate to contact me with any questions or if I can be of assistance with any of your other patients' GI needs. Signed By: Tamara Lopez MD 
                      January 4, 2021 Port CXR 1/7/21 IMPRESSION: Unchanged left basilar airspace disease and small left pleural 
Effusion Recent Days: 
Recent Labs 01/09/21 
1335 01/09/21 
0540 01/08/21 
3716 WBC 16.3* 14.6* 15.3* HGB 8.9* 7.2* 9.0*  
HCT 30.6* 25.4* 31.6*  
 228 284 Recent Labs 01/09/21 
0540 01/08/21 
3611  143  
K 3.6 3.7  106 CO2 32 35* GLU 86 85 BUN 18 19 CREA 0.78 0.98  
CA 7.4* 8.2* ALB 2.0* 2.5* ALT 30 34 No results for input(s): PH, PCO2, PO2, HCO3, FIO2 in the last 72 hours. 24 Hour Results: 
Recent Results (from the past 24 hour(s)) GLUCOSE, POC Collection Time: 01/09/21 12:31 PM  
Result Value Ref Range Glucose (POC) 131 (H) 65 - 100 mg/dL Performed by Neel Gonsales (PCT) CBC W/O DIFF  
 Collection Time: 01/09/21  1:35 PM  
Result Value Ref Range WBC 16.3 (H) 3.6 - 11.0 K/uL  
 RBC 3.23 (L) 3.80 - 5.20 M/uL HGB 8.9 (L) 11.5 - 16.0 g/dL HCT 30.6 (L) 35.0 - 47.0 % MCV 94.7 80.0 - 99.0 FL  
 MCH 27.6 26.0 - 34.0 PG  
 MCHC 29.1 (L) 30.0 - 36.5 g/dL RDW 22.0 (H) 11.5 - 14.5 % PLATELET 825 570 - 012 K/uL MPV 10.1 8.9 - 12.9 FL  
 NRBC 0.0 0  WBC ABSOLUTE NRBC 0.00 0.00 - 0.01 K/uL GLUCOSE, POC Collection Time: 01/09/21  4:59 PM  
Result Value Ref Range Glucose (POC) 214 (H) 65 - 100 mg/dL Performed by Enrique Hills  ([facility]) GLUCOSE, POC Collection Time: 01/09/21 10:02 PM  
Result Value Ref Range Glucose (POC) 181 (H) 65 - 100 mg/dL Performed by [facility] ([facility]) GLUCOSE, POC Collection Time: 01/10/21  6:49 AM  
Result Value Ref Range Glucose (POC) 85 65 - 100 mg/dL Performed by [facility] (PCT) Medications reviewed Current Facility-Administered Medications Medication Dose Route Frequency  insulin glargine (LANTUS) injection 18 Units  18 Units SubCUTAneous QHS  albuterol-ipratropium (DUO-NEB) 2.5 MG-0.5 MG/3 ML  3 mL Nebulization Q4H RT  
 dilTIAZem ER (CARDIZEM CD) capsule 240 mg  240 mg Oral DAILY  ferrous sulfate tablet 325 mg  1 Tab Oral DAILY WITH BREAKFAST  loperamide (IMODIUM) capsule 2 mg  2 mg Oral BID  nystatin (MYCOSTATIN) 100,000 unit/mL oral suspension 500,000 Units  500,000 Units Oral QID  lipase-protease-amylase (CREON 24,000) capsule 1 Cap  1 Cap Oral TID WITH MEALS  
 apixaban (ELIQUIS) tablet 5 mg  5 mg Oral BID  predniSONE (DELTASONE) tablet 20 mg  20 mg Oral DAILY WITH BREAKFAST  phosphorus (K PHOS NEUTRAL) 250 mg tablet 1 Tab  1 Tab Oral BID  pantoprazole (PROTONIX) tablet 40 mg  40 mg Oral ACB  
 0.9% sodium chloride infusion 250 mL  250 mL IntraVENous PRN  
 0.9% sodium chloride infusion 250 mL  250 mL IntraVENous PRN  
  sodium chloride (NS) flush 5-10 mL  5-10 mL IntraVENous PRN  
 sodium chloride (NS) flush 5-40 mL  5-40 mL IntraVENous Q8H  
 sodium chloride (NS) flush 5-40 mL  5-40 mL IntraVENous PRN  
 acetaminophen (TYLENOL) tablet 650 mg  650 mg Oral Q6H PRN Or  
 acetaminophen (TYLENOL) suppository 650 mg  650 mg Rectal Q6H PRN  polyethylene glycol (MIRALAX) packet 17 g  17 g Oral DAILY PRN  promethazine (PHENERGAN) tablet 12.5 mg  12.5 mg Oral Q6H PRN Or  
 ondansetron (ZOFRAN) injection 4 mg  4 mg IntraVENous Q6H PRN  
 0.9% sodium chloride infusion 250 mL  250 mL IntraVENous PRN  
 glucose chewable tablet 16 g  4 Tab Oral PRN  
 dextrose (D50W) injection syrg 12.5-25 g  25-50 mL IntraVENous PRN  
 glucagon (GLUCAGEN) injection 1 mg  1 mg IntraMUSCular PRN  
 insulin lispro (HUMALOG) injection   SubCUTAneous QID WITH MEALS  cholecalciferol (VITAMIN D3) (1000 Units /25 mcg) tablet 2 Tab  2,000 Units Oral DAILY  cyanocobalamin (VITAMIN B12) tablet 1,000 mcg  1,000 mcg Oral DAILY  DULoxetine (CYMBALTA) capsule 60 mg  60 mg Oral DAILY  metoprolol tartrate (LOPRESSOR) tablet 25 mg  25 mg Oral DAILY PRN  
 mirtazapine (REMERON) tablet 15 mg  15 mg Oral QHS  montelukast (SINGULAIR) tablet 10 mg  10 mg Oral QPM  
 
Care Plan discussed with: Patient, GI and Nurse Total time spent with patient: 30 minutes.  
Ferny Shelton MD

## 2021-01-11 NOTE — PROGRESS NOTES
1532: 
Covid results sent in [address] to [facility]. Transitional Plan of Care: RUR-43% 1. Monitor patient's response to treatment. 2. Patient plans to return to [address] [place] [address]. 1st covid done on 1/1. A 2nd Covid is needed for placement. 3. Daughter or [facility] (on 2L O2) 4. Pt had RVR last night, not stable to be d/c'd today 5. Case Management to follow BATLA Corona RN

## 2021-01-11 NOTE — PROGRESS NOTES
Called Dr. Divya Delacruz about the new order of digoxin to see if its okay since I gave a 5mg bolus of diltiazem. She stated go ahead and give.

## 2021-01-11 NOTE — PROGRESS NOTES
TC from nursing. HR in the 150s . A-fib with RVR. Given Dilt 5 mg IV push. Now at 102. Discussed with Dr Lillian Zaldivar. Will re start Digoxin at 0.125 mg daily.

## 2021-01-11 NOTE — PROGRESS NOTES
Writer notified by tele monitor tech patient heart rate was in the 140's. PRN metoprolol given, MD notified, ordered 12.5 mg of metoprolol. Med given to patient. Will continue to monitor. 
2000. Patient HR sustaining in the the 150's, Patient is nonsymptomatic MD notified, new order for Diltiazem 5mg IV.  
2015. Med given patient HR in the 120's. 
2030. Patient HR now going from 95 - 100 and sustaining, will continue to monitor.

## 2021-01-11 NOTE — PROGRESS NOTES
Bedside, Verbal and Written shift change report given to [id number] Jake Sanford (oncoming nurse) by Fran Jimenez (offgoing nurse). Report included the following information SBAR, Kardex, ED Summary, OR Summary, Intake/Output, MAR, Recent Results and Med Rec Status.

## 2021-01-11 NOTE — PROGRESS NOTES
Daily Progress Note: 1/11/2021 Pernell Ruiz MD 
 
Assessment/Plan:  
Left lower lobe pneumonia vrs localized pulmonary edema 
-Rocephin and azithromycin initially then off antibiotics per Pulmonary 
-Covid negative 
-pulmonology following 
  
Acute hypoxic respiratory failure 
-Continue with supplemental oxygen 
-Patient uses 3 L of oxygen at baseline Afib with RVR, fluid overloaded 
-dilt drip changed to po per Cards 
-monitor mag/phos/K+ and replete PRN 
-Cards restarted digoxin  
- resumed Eliquis 
-Consulted cardiology 
- lasix/diuretics per Cards 
  
Anemia - acute blood loss on chronic 
-Occult blood positive 
-Transfused 2 units PRBC so far -GI on board- endoscopies 1/4 as under Data Review - Colonic polys path revealed tubular adenomas - follow up pre GI 
  
Sepsis 
-Continue treatment of above 
- culture negative 
  
Elevated creatinine 
-monitor, treat as needed 
  
Hypertension 
-Continue with metoprolol 
  
Type 2 diabetes:  A1c 5.9 
-Patient takes Onglyza which we will hold 
-Continue sliding scale insulin  
-lantus halved to 10 
   
Anxiety/depression 
-Continue with Cymbalta and Remeron 
   
 
Problem List: 
Problem List as of 1/11/2021 Date Reviewed: 11/2/2020 Codes Class Noted - Resolved Left lower lobe pneumonia ICD-10-CM: J18.9 ICD-9-CM: 277  1/1/2021 - Present Leukocytosis ICD-10-CM: A35.604 ICD-9-CM: 288.60  11/3/2020 - Present Chronic respiratory failure with hypoxia [facility]) ICD-10-CM: J96.11 
ICD-9-CM: 518.83, 799.02  11/2/2020 - Present Overview Signed 11/2/2020 10:12 PM by Lorre Rubinstein, MD  
  On 3L NC at home Cellulitis of lower extremity ICD-10-CM: L03.119 ICD-9-CM: 682.6  3/23/2020 - Present ASO (arteriosclerosis obliterans) ICD-10-CM: I70.90 ICD-9-CM: 440.9  9/5/2019 - Present PVD [facility]peripheral vascular disease) [facility] ICD-10-CM: I73.9 ICD-9-CM: 443.9  8/1/2019 - Present Severe obesity ([facility] ICD-10-CM: E66.01 
ICD-9-CM: 278.01  7/30/2019 - Present COPD (chronic obstructive pulmonary disease) (HCC) ICD-10-CM: J44.9 ICD-9-CM: 498  7/13/2019 - Present Normocytic anemia ICD-10-CM: D64.9 ICD-9-CM: 285.9  7/13/2019 - Present PAD (peripheral artery disease) (HCC) ICD-10-CM: I73.9 ICD-9-CM: 443.9  7/13/2019 - Present Mild intermittent asthma ICD-10-CM: J45.20 ICD-9-CM: 493.90  5/17/2019 - Present Brachial artery thrombus (HCC) ICD-10-CM: H73.2 ICD-9-CM: 444.21  4/26/2019 - Present Sleep apnea ICD-10-CM: G47.30 ICD-9-CM: 780.57  1/5/2019 - Present Overview Signed 1/5/2019  8:41 PM by Diana Edmondson DO  
  wears CPAP Atrial fibrillation [facility]) ICD-10-CM: I48.91 
ICD-9-CM: 427.31  11/16/2018 - Present Obesity (BMI 30-39.9) (Chronic) ICD-10-CM: C67.6 ICD-9-CM: 278.00  11/13/2018 - Present Recurrent deep vein thrombosis (DVT) (HCC) ICD-10-CM: I82.409 ICD-9-CM: 453.40  3/30/2018 - Present Chronic anticoagulation (Chronic) ICD-10-CM: Z79.01 
ICD-9-CM: V58.61  3/30/2018 - Present Essential hypertension (Chronic) ICD-10-CM: I10 
ICD-9-CM: 401.9  1/22/2016 - Present CAD (coronary artery disease) ICD-10-CM: I25.10 ICD-9-CM: 414.00  10/9/2015 - Present Type II diabetes mellitus (HCC) (Chronic) ICD-10-CM: E11.9 ICD-9-CM: 250.00  10/9/2015 - Present RESOLVED: Syncope and collapse ICD-10-CM: R55 
ICD-9-CM: 780.2  9/29/2020 - 11/2/2020 RESOLVED: Cellulitis ICD-10-CM: L03.90 ICD-9-CM: 682.9  3/23/2020 - 5/29/2020 RESOLVED: Hypokalemia ICD-10-CM: E87.6 ICD-9-CM: 276.8  3/23/2020 - 5/29/2020 RESOLVED: Hyponatremia ICD-10-CM: E87.1 ICD-9-CM: 276.1  3/23/2020 - 5/29/2020 RESOLVED: Diarrhea ICD-10-CM: R19.7 ICD-9-CM: 787.91  3/23/2020 - 5/29/2020 RESOLVED: Syncope and collapse ICD-10-CM: R55 
ICD-9-CM: 780.2  7/13/2019 - 5/29/2020 RESOLVED: UTI (urinary tract infection) ICD-10-CM: N39.0 ICD-9-CM: 599.0  7/13/2019 - 11/2/2020 RESOLVED: Sepsis ([facility] ICD-10-CM: A41.9 ICD-9-CM: 038.9, 995.91  7/13/2019 - 11/3/2020 RESOLVED: Leg wound, left ICD-10-CM: F04.895Q ICD-9-CM: 891.0  7/13/2019 - 5/29/2020 RESOLVED: Leg laceration, right, initial encounter ICD-10-CM: L25.374O ICD-9-CM: 894.0  7/13/2019 - 5/29/2020 RESOLVED: Cellulitis of right upper arm ICD-10-CM: L03.113 ICD-9-CM: 682.3  5/17/2019 - 5/29/2020 RESOLVED: Gangrene of finger of right hand ([facility] ICD-10-CM: V55 
ICD-9-CM: 785.4  5/17/2019 - 11/2/2020 RESOLVED: Bowel obstruction ([facility] ICD-10-CM: J98.132 ICD-9-CM: 560.9  1/8/2019 - 5/29/2020 RESOLVED: Partial small bowel obstruction ([facility] ICD-10-CM: K56.600 ICD-9-CM: 560.9  1/5/2019 - 5/29/2020 RESOLVED: Dyspnea ICD-10-CM: R06.00 
ICD-9-CM: 786.09  11/13/2018 - 5/29/2020 RESOLVED: ARF (acute renal failure) (HCC) ICD-10-CM: N17.9 ICD-9-CM: 584.9  11/13/2018 - 5/29/2020 RESOLVED: Closed wedge compression fracture of T7 vertebra ([facility] ICD-10-CM: Y73.521N ICD-9-CM: 805.2  11/13/2018 - 5/29/2020 RESOLVED: Type 2 diabetes with nephropathy ([facility] ICD-10-CM: E11.21 
ICD-9-CM: 250.40, 583.81  10/1/2018 - 11/2/2020 RESOLVED: Chest pain ICD-10-CM: R07.9 ICD-9-CM: 786.50  6/27/2018 - 5/29/2020 RESOLVED: Abdominal pain ICD-10-CM: R10.9 ICD-9-CM: 789.00  6/27/2018 - 5/29/2020 RESOLVED: Coronary artery disease involving native coronary artery without angina pectoris (Chronic) ICD-10-CM: I25.10 ICD-9-CM: 414.01  1/22/2016 - 11/2/2020 RESOLVED: HTN (hypertension) ICD-10-CM: I10 
ICD-9-CM: 401.9  10/9/2015 - 1/22/2016 RESOLVED: NSTEMI (non-ST elevated myocardial infarction) ([facility] ICD-10-CM: I21.4 ICD-9-CM: 410.70  10/9/2015 - 5/29/2020 Subjective: Vita Lam is a 70 y.o. female who presented with shortness of breath which has been progressively worsening since this weekend. She reports having by cough and sinus congestion. Symptoms acutely worsened this morning. She denies any chest pain, abdominal pain, diarrhea, or fever. She has felt chills. Upon arrival of EMS she was found to have saturations in the 70s. She has a history of chronic COPD and uses 3 L of oxygen at baseline. [Dr Martinez Stake 1/2: Got 2 units PRBCs, but follow up labs unable to be obtained for the past 12 hours. I spoke with anesthesia and they will access her for central line. Pt tolerated PO, +BM yesterday. Says her legs are no more swollen than her baseline. 1/3: Sugar was in 50s before bed last night. Held bedtime humalog and halved lantus. Sugars better this AM.  Now in Afib with RVR. Responded only briefly to dilt push. Pt feels the tachycardia. Denies increased SOB or CP. Tolerated PO yesterday. Due for bowel prep today, but on hold until cardiovascularly more stable. 1/4:  She reports she feels better this AM.  Currently in sinus rhythm at this moment. She is going to endoscopy this AM.  Hb up to 8.6. WBC 14.4. Will decrease Prednisone to 20mg per day. 1/5:  Continues to feel better but still very fatigued. Remains in sinus rhythm at this moment. Upper and lower endoscopies as under Data Review. Eliquis resumed. 1/6:  About the same with no specific complaints except \"feel weak all over. \"  Back in A Fib but rate ok in 90s to low 100s. Hb stable at 8.5. She is willing to go to rehab so will get Case Management to see. 1/7:  No specific complaints. Hb 8.3. Cards reported irreg rate more of PACs than A fib at this time. Repeat CXR today. Stable for rehab when bed found. Tubular adenomas on colon polyp path - follow up per GI. :  No specific complaints. Still very weak, cannot get up on her own and I do not think she can manage at assisted living without 24/7 care at this point. CXR yesterday without progression. She declined rehab yesterday but after extended discussion she would agree to go to [facility]. PT still recommending rehab. She is basically at baseline except her weakness. :  She reports she is feeling better. She wants to try to get up to chair today. Hb down from 9.0 to 7.2 this AM - recheck later today and transfuse if needed. 1/10:  Other than fatigue and weakness she reports no complaints. She sat up on edge of bed yesterday. Pulled IJ out last night and nurses report unable to get labs. Pt requests no labs unless absolutely necessary. Recheck of Hb yesterday PM was 8.9 so will hold off on labs for now. Stable for rehab.   
 
:  Reports she feels back to usual this AM.  Another episode of A fib with RVR last night - Cards restarted Dig, gave IV bolus of Cardizem and she allowed labs yesterday and converted back to sinus. Hb down a bit at 7.7 yesterday. AM labs not drawn yet. Review of Systems: A comprehensive review of systems was negative except for that written in the HPI. Objective:  
Physical Exam:  
Visit Vitals BP (!) 166/73 (BP 1 Location: Left arm, BP Patient Position: At rest) Pulse 70 Temp 97.5 °F (36.4 °C) Resp 20 Ht 5' 7\" (1.702 m) Wt 111.4 kg (245 lb 9.6 oz) SpO2 96% BMI 38.47 kg/m² O2 Flow Rate (L/min): 2 l/min O2 Device: Nasal cannula Temp (24hrs), Av.2 °F (36.8 °C), Min:97.5 °F (36.4 °C), Max:99 °F (37.2 °C) No intake/output data recorded.  1901 -  0700 In: 1200 [P.O.:1200] Out: - General:  Alert, cooperative, no distress, appears stated age, moon-face. Head:  Normocephalic, without obvious abnormality, atraumatic. Eyes:  Conjunctivae/corneas clear. EOMs intact.   
Throat: Lips, mucosa, and tongue moist.  
 Neck: Supple, symmetrical, trachea midline, no adenopathy, thyroid: no enlargement/tenderness/nodules, no carotid bruit and no JVD. Lungs:    diminished at bases, a few scattered rhonchi Chest wall:  No tenderness or deformity. Heart:   reg with rate in 70s at this time - monitor shows sinus. no murmur, click, rub or gallop. Abdomen:   Soft, non-tender. Bowel sounds normal. No masses,  No organomegaly. 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 Arms/hands much less swollen. No calf tenderness or cords. Pulses: 2+ and symmetric all extremities. Skin: Skin color, texture, turgor normal. No rashes Neurologic:   Alert and oriented X 3.  equal.  Gait not tested at this time. Sensation grossly normal to touch. Gross motor of extremities normal.    
 
Data Review:  
  Esophagogastroduodenoscopy (EGD) Colonoscopy Procedure Note Eddye Tree : 1945 [address] [place] [address] Record Number: [id number] 
  
  
Indications:    Iron deficiency anemia, diarrhea Referring Physician:  Edilberto Perkins MD 
Anesthesia/Sedation: see nursing notes Endoscopist:  Dr. Carolee Hubbard Assistants: None Procedure in Detail: After obtaining informed consent, positioning of the patient in the left lateral decubitus position, and conduction of a pre-procedure pause or \"time out\" the endoscope was introduced into the mouth and advanced to the duodenum. A careful inspection was made, and findings or interventions are described below. Findings:  
Esophagus:numerous white exudates Stomach: antral erythema with soft submucosal mass Normal body and fundus Duodenum/jejunum: erythema, erosions second into third portion Complications/estimated blood loss: none Therapies:  biopsy of stomach antrum at site submucosal mass 
biopsy of duodenal second portion, third portion Specimens: biopsies Implants:none Endoscopist:  Dr. Dorie Goldberg Assistants: None Complications:  None Estimate Blood Loss:  None 
  
Colonoscopy Procedure in Detail: After obtaining informed consent, positioning of the patient in the left lateral decubitus position, and conduction of a pre-procedure pause or \"time out\" the endoscope was introduced into the anus and advanced to the terminal ileum. The quality of the colonic preparation was adequate. A careful inspection was made as the colonoscope was withdrawn, findings and interventions are described below. Appendiceal orifice photographed Findings: - Diverticulosis Two sessile 12 mm polyps Specimens:  
 polyps removed cold snare; random biopsies cecum, ascending Implants: none Complications:  
None; patient tolerated the procedure well. Estimated blood loss: none Impression: 
Candida esophagitis, gastritis, duodenitis, colon polyps, diverticulosis. Antral mass likely a benign lipoma Recommendations: - Await pathology. - anticoagulate tomorrow Resume diet; add creon, nystatin suspension Thank you for entrusting me with this patient's care. Please do not hesitate to contact me with any questions or if I can be of assistance with any of your other patients' GI needs. Signed By: Dorie Goldberg MD 
                      January 4, 2021 Port CXR 1/7/21 IMPRESSION: Unchanged left basilar airspace disease and small left pleural 
Effusion Recent Days: 
Recent Labs  
  01/11/21 
0808 01/10/21 
2040 01/10/21 
0945 WBC 13.1* 13.0* 14.1* HGB 8.3* 7.7* 8.4* HCT 29.1* 27.0* 28.8*  
 256 278 Recent Labs  
  01/11/21 
0808 01/10/21 
2040 01/10/21 
0945  141 141  
K 3.8 4.1 3.7  105 107 CO2 34* 32 30 GLU 94 187* 79 BUN 23* 22* 19  
CREA 1.07* 1.21* 0.95  
CA 8.2* 7.8* 8.1*  
MG 2.2  --   --   
ALB 2.3* 2.1* 2.2* ALT 49 40 35 No results for input(s): PH, PCO2, PO2, HCO3, FIO2 in the last 72 hours. 24 Hour Results: Recent Results (from the past 24 hour(s)) GLUCOSE, POC Collection Time: 01/10/21  4:39 PM  
Result Value Ref Range Glucose (POC) 174 (H) 65 - 100 mg/dL Performed by Bell Bond  (PCT) CBC WITH AUTOMATED DIFF Collection Time: 01/10/21  8:40 PM  
Result Value Ref Range WBC 13.0 (H) 3.6 - 11.0 K/uL  
 RBC 2.81 (L) 3.80 - 5.20 M/uL HGB 7.7 (L) 11.5 - 16.0 g/dL HCT 27.0 (L) 35.0 - 47.0 % MCV 96.1 80.0 - 99.0 FL  
 MCH 27.4 26.0 - 34.0 PG  
 MCHC 28.5 (L) 30.0 - 36.5 g/dL RDW 22.1 (H) 11.5 - 14.5 % PLATELET 198 861 - 786 K/uL MPV 10.8 8.9 - 12.9 FL  
 NRBC 0.0 0  WBC ABSOLUTE NRBC 0.00 0.00 - 0.01 K/uL NEUTROPHILS 88 (H) 32 - 75 % LYMPHOCYTES 6 (L) 12 - 49 % MONOCYTES 5 5 - 13 % EOSINOPHILS 0 0 - 7 % BASOPHILS 0 0 - 1 % IMMATURE GRANULOCYTES 1 (H) 0.0 - 0.5 % ABS. NEUTROPHILS 11.4 (H) 1.8 - 8.0 K/UL  
 ABS. LYMPHOCYTES 0.8 0.8 - 3.5 K/UL  
 ABS. MONOCYTES 0.7 0.0 - 1.0 K/UL  
 ABS. EOSINOPHILS 0.0 0.0 - 0.4 K/UL  
 ABS. BASOPHILS 0.0 0.0 - 0.1 K/UL  
 ABS. IMM. GRANS. 0.1 (H) 0.00 - 0.04 K/UL  
 DF AUTOMATED    
 RBC COMMENTS ANISOCYTOSIS 
1+ METABOLIC PANEL, COMPREHENSIVE Collection Time: 01/10/21  8:40 PM  
Result Value Ref Range Sodium 141 136 - 145 mmol/L Potassium 4.1 3.5 - 5.1 mmol/L Chloride 105 97 - 108 mmol/L  
 CO2 32 21 - 32 mmol/L Anion gap 4 (L) 5 - 15 mmol/L Glucose 187 (H) 65 - 100 mg/dL BUN 22 (H) 6 - 20 MG/DL Creatinine 1.21 (H) 0.55 - 1.02 MG/DL  
 BUN/Creatinine ratio 18 12 - 20 GFR est AA 53 (L) >60 ml/min/1.73m2 GFR est non-AA 44 (L) >60 ml/min/1.73m2 Calcium 7.8 (L) 8.5 - 10.1 MG/DL Bilirubin, total 0.4 0.2 - 1.0 MG/DL  
 ALT (SGPT) 40 12 - 78 U/L  
 AST (SGOT) 40 (H) 15 - 37 U/L Alk. phosphatase 222 (H) 45 - 117 U/L Protein, total 5.1 (L) 6.4 - 8.2 g/dL Albumin 2.1 (L) 3.5 - 5.0 g/dL Globulin 3.0 2.0 - 4.0 g/dL A-G Ratio 0.7 (L) 1.1 - 2.2 GLUCOSE, POC  
 Collection Time: 01/10/21  9:05 PM  
Result Value Ref Range Glucose (POC) 187 (H) 65 - 100 mg/dL Performed by Nicolas Lau GLUCOSE, POC Collection Time: 01/10/21  9:18 PM  
Result Value Ref Range Glucose (POC) 184 (H) 65 - 100 mg/dL Performed by Josiah Mckenzie (PCT) GLUCOSE, POC Collection Time: 01/11/21  6:46 AM  
Result Value Ref Range Glucose (POC) 99 65 - 100 mg/dL Performed by Josiah Mckenzie (PCT) CBC WITH AUTOMATED DIFF Collection Time: 01/11/21  8:08 AM  
Result Value Ref Range WBC 13.1 (H) 3.6 - 11.0 K/uL  
 RBC 3.03 (L) 3.80 - 5.20 M/uL HGB 8.3 (L) 11.5 - 16.0 g/dL HCT 29.1 (L) 35.0 - 47.0 % MCV 96.0 80.0 - 99.0 FL  
 MCH 27.4 26.0 - 34.0 PG  
 MCHC 28.5 (L) 30.0 - 36.5 g/dL RDW 22.2 (H) 11.5 - 14.5 % PLATELET 487 490 - 626 K/uL MPV 10.2 8.9 - 12.9 FL  
 NRBC 0.0 0  WBC ABSOLUTE NRBC 0.00 0.00 - 0.01 K/uL NEUTROPHILS 78 (H) 32 - 75 % LYMPHOCYTES 11 (L) 12 - 49 % MONOCYTES 8 5 - 13 % EOSINOPHILS 2 0 - 7 % BASOPHILS 0 0 - 1 % IMMATURE GRANULOCYTES 1 (H) 0.0 - 0.5 % ABS. NEUTROPHILS 10.3 (H) 1.8 - 8.0 K/UL  
 ABS. LYMPHOCYTES 1.4 0.8 - 3.5 K/UL  
 ABS. MONOCYTES 1.0 0.0 - 1.0 K/UL  
 ABS. EOSINOPHILS 0.3 0.0 - 0.4 K/UL  
 ABS. BASOPHILS 0.0 0.0 - 0.1 K/UL  
 ABS. IMM. GRANS. 0.1 (H) 0.00 - 0.04 K/UL  
 DF SMEAR SCANNED    
 RBC COMMENTS ANISOCYTOSIS 2+ 
    
 RBC COMMENTS OVALOCYTES PRESENT 
    
 RBC COMMENTS HYPOCHROMIA 1+ METABOLIC PANEL, COMPREHENSIVE Collection Time: 01/11/21  8:08 AM  
Result Value Ref Range Sodium 143 136 - 145 mmol/L Potassium 3.8 3.5 - 5.1 mmol/L Chloride 106 97 - 108 mmol/L  
 CO2 34 (H) 21 - 32 mmol/L Anion gap 3 (L) 5 - 15 mmol/L Glucose 94 65 - 100 mg/dL BUN 23 (H) 6 - 20 MG/DL Creatinine 1.07 (H) 0.55 - 1.02 MG/DL  
 BUN/Creatinine ratio 21 (H) 12 - 20 GFR est AA >60 >60 ml/min/1.73m2 GFR est non-AA 50 (L) >60 ml/min/1.73m2 Calcium 8.2 (L) 8.5 - 10.1 MG/DL Bilirubin, total 0.6 0.2 - 1.0 MG/DL  
 ALT (SGPT) 49 12 - 78 U/L  
 AST (SGOT) 44 (H) 15 - 37 U/L Alk. phosphatase 243 (H) 45 - 117 U/L Protein, total 5.4 (L) 6.4 - 8.2 g/dL Albumin 2.3 (L) 3.5 - 5.0 g/dL Globulin 3.1 2.0 - 4.0 g/dL A-G Ratio 0.7 (L) 1.1 - 2.2 MAGNESIUM Collection Time: 01/11/21  8:08 AM  
Result Value Ref Range Magnesium 2.2 1.6 - 2.4 mg/dL SAMPLES BEING HELD Collection Time: 01/11/21  8:08 AM  
Result Value Ref Range SAMPLES BEING HELD 1SST COMMENT Add-on orders for these samples will be processed based on acceptable specimen integrity and analyte stability, which may vary by analyte. SARS-COV-2 Collection Time: 01/11/21 11:05 AM  
Result Value Ref Range Specimen source Nasopharyngeal    
 Specimen source Nasopharyngeal    
 COVID-19 rapid test Not detected NOTD Specimen type NP Swab Health status Symptomatic Testing GLUCOSE, POC Collection Time: 01/11/21 11:17 AM  
Result Value Ref Range Glucose (POC) 84 65 - 100 mg/dL Performed by Nallely Cortez Medications reviewed Current Facility-Administered Medications Medication Dose Route Frequency  digoxin (LANOXIN) tablet 0.125 mg  0.125 mg Oral DAILY  insulin glargine (LANTUS) injection 18 Units  18 Units SubCUTAneous QHS  albuterol-ipratropium (DUO-NEB) 2.5 MG-0.5 MG/3 ML  3 mL Nebulization Q4H RT  
 dilTIAZem ER (CARDIZEM CD) capsule 240 mg  240 mg Oral DAILY  ferrous sulfate tablet 325 mg  1 Tab Oral DAILY WITH BREAKFAST  loperamide (IMODIUM) capsule 2 mg  2 mg Oral BID  nystatin (MYCOSTATIN) 100,000 unit/mL oral suspension 500,000 Units  500,000 Units Oral QID  lipase-protease-amylase (CREON 24,000) capsule 1 Cap  1 Cap Oral TID WITH MEALS  
 apixaban (ELIQUIS) tablet 5 mg  5 mg Oral BID  predniSONE (DELTASONE) tablet 20 mg  20 mg Oral DAILY WITH BREAKFAST  phosphorus (K PHOS NEUTRAL) 250 mg tablet 1 Tab  1 Tab Oral BID  pantoprazole (PROTONIX) tablet 40 mg  40 mg Oral ACB  
 0.9% sodium chloride infusion 250 mL  250 mL IntraVENous PRN  
 0.9% sodium chloride infusion 250 mL  250 mL IntraVENous PRN  
 sodium chloride (NS) flush 5-10 mL  5-10 mL IntraVENous PRN  
 sodium chloride (NS) flush 5-40 mL  5-40 mL IntraVENous Q8H  
 sodium chloride (NS) flush 5-40 mL  5-40 mL IntraVENous PRN  
 acetaminophen (TYLENOL) tablet 650 mg  650 mg Oral Q6H PRN Or  
 acetaminophen (TYLENOL) suppository 650 mg  650 mg Rectal Q6H PRN  polyethylene glycol (MIRALAX) packet 17 g  17 g Oral DAILY PRN  promethazine (PHENERGAN) tablet 12.5 mg  12.5 mg Oral Q6H PRN Or  
 ondansetron (ZOFRAN) injection 4 mg  4 mg IntraVENous Q6H PRN  
 0.9% sodium chloride infusion 250 mL  250 mL IntraVENous PRN  
 glucose chewable tablet 16 g  4 Tab Oral PRN  
 dextrose (D50W) injection syrg 12.5-25 g  25-50 mL IntraVENous PRN  
 glucagon (GLUCAGEN) injection 1 mg  1 mg IntraMUSCular PRN  
 insulin lispro (HUMALOG) injection   SubCUTAneous QID WITH MEALS  cholecalciferol (VITAMIN D3) (1000 Units /25 mcg) tablet 2 Tab  2,000 Units Oral DAILY  cyanocobalamin (VITAMIN B12) tablet 1,000 mcg  1,000 mcg Oral DAILY  DULoxetine (CYMBALTA) capsule 60 mg  60 mg Oral DAILY  metoprolol tartrate (LOPRESSOR) tablet 25 mg  25 mg Oral DAILY PRN  
 mirtazapine (REMERON) tablet 15 mg  15 mg Oral QHS  montelukast (SINGULAIR) tablet 10 mg  10 mg Oral QPM  
 
Care Plan discussed with: Patient, GI and Nurse Total time spent with patient: 30 minutes.  
Tyrell Durán MD

## 2021-01-11 NOTE — PROGRESS NOTES
I spoke with [facility] in admissions at [address] [facility]. Covid negative on 1/8 needs another test. Rapid is acceptable. Spoke to physician, she requested that a rapid test be ordered. I spoke with nursing, that order will be placed today.  CINDI Zuniga

## 2021-01-11 NOTE — PROGRESS NOTES
Per day shift nurse patient went into aAfib rvr around 1830 gave metoprolol, called doctor about HR still in 150s advised to give another of meoprolol also advised doctor patient pulled out EJ and has no IV access. Doctor advised to give 5mg of diltiazem IV push, but to call Cards to see if its okay. Per day shift nurse called Cards  
 2000: tele called advised still in Afib at 150s advised to call a rapid 22g iv was placed and order in for Diltiazem 5mg iv push was given patient converted back to nsr per tele around 2030pm will continue to monitor patient.

## 2021-01-11 NOTE — PROGRESS NOTES
Bedside and Verbal shift change report given to Shabana RN (oncoming nurse) by Niru Granger RN (offgoing nurse). Report included the following information SBAR, Kardex, Intake/Output, MAR, Accordion and Recent Results.

## 2021-01-12 NOTE — PROGRESS NOTES
Bedside and Verbal shift change report given to Chanel Sweet RN (oncoming nurse) by Olen Schwab, RN (offgoing nurse). Report included the following information SBAR, Kardex, MAR and Accordion.

## 2021-01-12 NOTE — PROGRESS NOTES
I spoke with Alicia Villanueva in admissions with [address] [place] [address]. Updates sent in Bib + Tuck. HB lab is not back at this time. Case management will continue to follow.  CINDI Card

## 2021-01-12 NOTE — PROGRESS NOTES
Transitional Plan of Care: RUR-43% 1. Monitor patient's response to treatment. 2. Patient plans to go to [facility] for rehab. 3. Daughter or [facility] (on 2L O2) 4. Awaiting f/u hgb results 5. Case Management to follow 6. PT/OT following BALTA Overton RN

## 2021-01-12 NOTE — PROGRESS NOTES
Daily Progress Note: 1/12/2021 Denise Moreno MD 
 
Assessment/Plan:  
Left lower lobe pneumonia vrs localized pulmonary edema 
-Rocephin and azithromycin initially then off antibiotics per Pulmonary 
-Covid negative 
-pulmonology following 
  
Acute hypoxic respiratory failure 
-Continue with supplemental oxygen 
-Patient uses 3 L of oxygen at baseline Afib with RVR, fluid overloaded 
-dilt drip changed to po per Cards 
-monitor mag/phos/K+ and replete PRN 
-Cards restarted digoxin  
- resumed Eliquis 
-Consulted cardiology 
- lasix/diuretics per Cards 
  
Anemia - acute blood loss on chronic 
-Occult blood positive 
-Transfused 2 units PRBC so far -GI on board- endoscopies 1/4 as under Data Review - Colonic polys path revealed tubular adenomas - follow up pre GI 
  
Sepsis 
-Continue treatment of above 
- culture negative 
  
Elevated creatinine 
-monitor, treat as needed 
  
Hypertension 
-Continue with metoprolol 
  
Type 2 diabetes:  A1c 5.9 
-Patient takes Onglyza which we will hold 
-Continue sliding scale insulin  
-lantus halved to 10 
   
Anxiety/depression 
-Continue with Cymbalta and Remeron 
   
 
Problem List: 
Problem List as of 1/12/2021 Date Reviewed: 11/2/2020 Codes Class Noted - Resolved Left lower lobe pneumonia ICD-10-CM: J18.9 ICD-9-CM: 470  1/1/2021 - Present Leukocytosis ICD-10-CM: P30.939 ICD-9-CM: 288.60  11/3/2020 - Present Chronic respiratory failure with hypoxia [facility]) ICD-10-CM: J96.11 
ICD-9-CM: 518.83, 799.02  11/2/2020 - Present Overview Signed 11/2/2020 10:12 PM by Gina Jeffries MD  
  On 3L NC at home Cellulitis of lower extremity ICD-10-CM: L03.119 ICD-9-CM: 682.6  3/23/2020 - Present ASO (arteriosclerosis obliterans) ICD-10-CM: I70.90 ICD-9-CM: 440.9  9/5/2019 - Present PVD (peripheral vascular disease) ([facility] ICD-10-CM: I73.9 ICD-9-CM: 443.9  8/1/2019 - Present Severe obesity ([facility] ICD-10-CM: E66.01 
ICD-9-CM: 278.01  7/30/2019 - Present COPD (chronic obstructive pulmonary disease) (HCC) ICD-10-CM: J44.9 ICD-9-CM: 664  7/13/2019 - Present Normocytic anemia ICD-10-CM: D64.9 ICD-9-CM: 285.9  7/13/2019 - Present PAD (peripheral artery disease) (HCC) ICD-10-CM: I73.9 ICD-9-CM: 443.9  7/13/2019 - Present Mild intermittent asthma ICD-10-CM: J45.20 ICD-9-CM: 493.90  5/17/2019 - Present Brachial artery thrombus (HCC) ICD-10-CM: F96.5 ICD-9-CM: 444.21  4/26/2019 - Present Sleep apnea ICD-10-CM: G47.30 ICD-9-CM: 780.57  1/5/2019 - Present Overview Signed 1/5/2019  8:41 PM by Kiana Hamlin, DO  
  wears CPAP Atrial fibrillation [facility]) ICD-10-CM: I48.91 
ICD-9-CM: 427.31  11/16/2018 - Present Obesity (BMI 30-39.9) (Chronic) ICD-10-CM: S02.9 ICD-9-CM: 278.00  11/13/2018 - Present Recurrent deep vein thrombosis (DVT) (HCC) ICD-10-CM: I82.409 ICD-9-CM: 453.40  3/30/2018 - Present Chronic anticoagulation (Chronic) ICD-10-CM: Z79.01 
ICD-9-CM: V58.61  3/30/2018 - Present Essential hypertension (Chronic) ICD-10-CM: I10 
ICD-9-CM: 401.9  1/22/2016 - Present CAD (coronary artery disease) ICD-10-CM: I25.10 ICD-9-CM: 414.00  10/9/2015 - Present Type II diabetes mellitus (HCC) (Chronic) ICD-10-CM: E11.9 ICD-9-CM: 250.00  10/9/2015 - Present RESOLVED: Syncope and collapse ICD-10-CM: R55 
ICD-9-CM: 780.2  9/29/2020 - 11/2/2020 RESOLVED: Cellulitis ICD-10-CM: L03.90 ICD-9-CM: 682.9  3/23/2020 - 5/29/2020 RESOLVED: Hypokalemia ICD-10-CM: E87.6 ICD-9-CM: 276.8  3/23/2020 - 5/29/2020 RESOLVED: Hyponatremia ICD-10-CM: E87.1 ICD-9-CM: 276.1  3/23/2020 - 5/29/2020 RESOLVED: Diarrhea ICD-10-CM: R19.7 ICD-9-CM: 787.91  3/23/2020 - 5/29/2020 RESOLVED: Syncope and collapse ICD-10-CM: R55 
ICD-9-CM: 780.2  7/13/2019 - 5/29/2020 RESOLVED: UTI (urinary tract infection) ICD-10-CM: N39.0 ICD-9-CM: 599.0  7/13/2019 - 11/2/2020 RESOLVED: Sepsis ([facility] ICD-10-CM: A41.9 ICD-9-CM: 038.9, 995.91  7/13/2019 - 11/3/2020 RESOLVED: Leg wound, left ICD-10-CM: F39.926X ICD-9-CM: 891.0  7/13/2019 - 5/29/2020 RESOLVED: Leg laceration, right, initial encounter ICD-10-CM: K53.838D ICD-9-CM: 894.0  7/13/2019 - 5/29/2020 RESOLVED: Cellulitis of right upper arm ICD-10-CM: L03.113 ICD-9-CM: 682.3  5/17/2019 - 5/29/2020 RESOLVED: Gangrene of finger of right hand ([facility] ICD-10-CM: P27 
ICD-9-CM: 785.4  5/17/2019 - 11/2/2020 RESOLVED: Bowel obstruction ([facility] ICD-10-CM: Z00.625 ICD-9-CM: 560.9  1/8/2019 - 5/29/2020 RESOLVED: Partial small bowel obstruction ([facility] ICD-10-CM: K56.600 ICD-9-CM: 560.9  1/5/2019 - 5/29/2020 RESOLVED: Dyspnea ICD-10-CM: R06.00 
ICD-9-CM: 786.09  11/13/2018 - 5/29/2020 RESOLVED: ARF (acute renal failure) (HCC) ICD-10-CM: N17.9 ICD-9-CM: 584.9  11/13/2018 - 5/29/2020 RESOLVED: Closed wedge compression fracture of T7 vertebra ([facility] ICD-10-CM: C27.140C ICD-9-CM: 805.2  11/13/2018 - 5/29/2020 RESOLVED: Type 2 diabetes with nephropathy ([facility] ICD-10-CM: E11.21 
ICD-9-CM: 250.40, 583.81  10/1/2018 - 11/2/2020 RESOLVED: Chest pain ICD-10-CM: R07.9 ICD-9-CM: 786.50  6/27/2018 - 5/29/2020 RESOLVED: Abdominal pain ICD-10-CM: R10.9 ICD-9-CM: 789.00  6/27/2018 - 5/29/2020 RESOLVED: Coronary artery disease involving native coronary artery without angina pectoris (Chronic) ICD-10-CM: I25.10 ICD-9-CM: 414.01  1/22/2016 - 11/2/2020 RESOLVED: HTN (hypertension) ICD-10-CM: I10 
ICD-9-CM: 401.9  10/9/2015 - 1/22/2016 RESOLVED: NSTEMI (non-ST elevated myocardial infarction) ([facility] ICD-10-CM: I21.4 ICD-9-CM: 410.70  10/9/2015 - 5/29/2020 Subjective: Dl Dodd is a 70 y.o. female who presented with shortness of breath which has been progressively worsening since this weekend. She reports having by cough and sinus congestion. Symptoms acutely worsened this morning. She denies any chest pain, abdominal pain, diarrhea, or fever. She has felt chills. Upon arrival of EMS she was found to have saturations in the 70s. She has a history of chronic COPD and uses 3 L of oxygen at baseline. [Dr Katerina Young 1/2: Got 2 units PRBCs, but follow up labs unable to be obtained for the past 12 hours. I spoke with anesthesia and they will access her for central line. Pt tolerated PO, +BM yesterday. Says her legs are no more swollen than her baseline. 1/3: Sugar was in 50s before bed last night. Held bedtime humalog and halved lantus. Sugars better this AM.  Now in Afib with RVR. Responded only briefly to dilt push. Pt feels the tachycardia. Denies increased SOB or CP. Tolerated PO yesterday. Due for bowel prep today, but on hold until cardiovascularly more stable. 1/4:  She reports she feels better this AM.  Currently in sinus rhythm at this moment. She is going to endoscopy this AM.  Hb up to 8.6. WBC 14.4. Will decrease Prednisone to 20mg per day. 1/5:  Continues to feel better but still very fatigued. Remains in sinus rhythm at this moment. Upper and lower endoscopies as under Data Review. Eliquis resumed. 1/6:  About the same with no specific complaints except \"feel weak all over. \"  Back in A Fib but rate ok in 90s to low 100s. Hb stable at 8.5. She is willing to go to rehab so will get Case Management to see. 1/7:  No specific complaints. Hb 8.3. Cards reported irreg rate more of PACs than A fib at this time. Repeat CXR today. Stable for rehab when bed found. Tubular adenomas on colon polyp path - follow up per GI. :  No specific complaints. Still very weak, cannot get up on her own and I do not think she can manage at assisted living without 24/7 care at this point. CXR yesterday without progression. She declined rehab yesterday but after extended discussion she would agree to go to [facility]. PT still recommending rehab. She is basically at baseline except her weakness. :  She reports she is feeling better. She wants to try to get up to chair today. Hb down from 9.0 to 7.2 this AM - recheck later today and transfuse if needed. 1/10:  Other than fatigue and weakness she reports no complaints. She sat up on edge of bed yesterday. Pulled IJ out last night and nurses report unable to get labs. Pt requests no labs unless absolutely necessary. Recheck of Hb yesterday PM was 8.9 so will hold off on labs for now. Stable for rehab.   
 
:  Reports she feels back to usual this AM.  Another episode of A fib with RVR last night - Cards restarted Dig, gave IV bolus of Cardizem and she allowed labs yesterday and converted back to sinus. Hb down a bit at 7.7 yesterday. AM labs not drawn yet. :  Little change from yesterday she reports. Still c/o fatigue and weakness. Rate down to 80s. Remains afebrile. Repeat Covid negative. Hb yesterday AM 8.3. If this AM Hb is ok she could go to [facility] later today. Pt reports she feels ready to go to rehab. Review of Systems: A comprehensive review of systems was negative except for that written in the HPI. Objective:  
Physical Exam:  
Visit Vitals BP (!) 156/70 (BP 1 Location: Left arm, BP Patient Position: At rest) Pulse 89 Temp 98.1 °F (36.7 °C) Resp 16 Ht 5' 7\" (1.702 m) Wt 111.4 kg (245 lb 9.6 oz) SpO2 98% BMI 38.47 kg/m² O2 Flow Rate (L/min): 2 l/min O2 Device: Nasal cannula Temp (24hrs), Av °F (36.7 °C), Min:97.5 °F (36.4 °C), Max:98.2 °F (36.8 °C) No intake/output data recorded. 01/10 0701 -  190 In: 1200 [P.O.:1200] Out: 1 General:  Alert, cooperative, no distress, appears stated age, moon-face. Head:  Normocephalic, without obvious abnormality, atraumatic. Eyes:  Conjunctivae/corneas clear. EOMs intact. Throat: Lips, mucosa, and tongue moist.  
Neck: Supple, symmetrical, trachea midline, no adenopathy, thyroid: no enlargement/tenderness/nodules, no carotid bruit and no JVD. Lungs:    diminished at bases, a few scattered rhonchi Chest wall:  No tenderness or deformity. Heart:   irreg with rate in 80s at this time - monitor shows sinus. no murmur, click, rub or gallop. Abdomen:   Soft, non-tender. Bowel sounds normal. No masses,  No organomegaly. 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 Arms/hands much less swollen. No calf tenderness or cords. Pulses: 2+ and symmetric all extremities. Skin: Skin color, texture, turgor normal. No rashes Neurologic:   Alert and oriented X 3.  equal.  Gait not tested at this time. Sensation grossly normal to touch. Gross motor of extremities normal.    
 
Data Review:  
  Esophagogastroduodenoscopy (EGD) Colonoscopy Procedure Note Pa Simeon : 1945 [address] [place] [address] Record Number: [id number] 
  
  
Indications:    Iron deficiency anemia, diarrhea Referring Physician:  Mary Arcos MD 
Anesthesia/Sedation: see nursing notes Endoscopist:  Dr. Rehana Connor Assistants: None Procedure in Detail: After obtaining informed consent, positioning of the patient in the left lateral decubitus position, and conduction of a pre-procedure pause or \"time out\" the endoscope was introduced into the mouth and advanced to the duodenum. A careful inspection was made, and findings or interventions are described below. Findings:  
Esophagus:numerous white exudates Stomach: antral erythema with soft submucosal mass Normal body and fundus Duodenum/jejunum: erythema, erosions second into third portion Complications/estimated blood loss: none Therapies:  biopsy of stomach antrum at site submucosal mass 
biopsy of duodenal second portion, third portion Specimens: biopsies Implants:none Endoscopist:  Dr. Catracho Oakley Assistants: None Complications:  None Estimate Blood Loss:  None 
  
Colonoscopy Procedure in Detail: After obtaining informed consent, positioning of the patient in the left lateral decubitus position, and conduction of a pre-procedure pause or \"time out\" the endoscope was introduced into the anus and advanced to the terminal ileum. The quality of the colonic preparation was adequate. A careful inspection was made as the colonoscope was withdrawn, findings and interventions are described below. Appendiceal orifice photographed Findings: - Diverticulosis Two sessile 12 mm polyps Specimens:  
 polyps removed cold snare; random biopsies cecum, ascending Implants: none Complications:  
None; patient tolerated the procedure well. Estimated blood loss: none Impression: 
Candida esophagitis, gastritis, duodenitis, colon polyps, diverticulosis. Antral mass likely a benign lipoma Recommendations: - Await pathology. - anticoagulate tomorrow Resume diet; add creon, nystatin suspension Thank you for entrusting me with this patient's care. Please do not hesitate to contact me with any questions or if I can be of assistance with any of your other patients' GI needs. Signed By: Catracho Oakley MD 
                      January 4, 2021 Port CXR 1/7/21 IMPRESSION: Unchanged left basilar airspace disease and small left pleural 
Effusion Recent Days: 
Recent Labs  
  01/11/21 
0808 01/10/21 
2040 01/10/21 
0945 WBC 13.1* 13.0* 14.1* HGB 8.3* 7.7* 8.4* HCT 29.1* 27.0* 28.8*  
 256 278 Recent Labs  
  01/11/21 8643 01/10/21 
2040 01/10/21 
0945  141 141  
K 3.8 4.1 3.7  105 107 CO2 34* 32 30 GLU 94 187* 79 BUN 23* 22* 19  
CREA 1.07* 1.21* 0.95  
CA 8.2* 7.8* 8.1*  
MG 2.2  --   --   
ALB 2.3* 2.1* 2.2* ALT 49 40 35 No results for input(s): PH, PCO2, PO2, HCO3, FIO2 in the last 72 hours. 24 Hour Results: 
Recent Results (from the past 24 hour(s)) GLUCOSE, POC Collection Time: 01/11/21  6:46 AM  
Result Value Ref Range Glucose (POC) 99 65 - 100 mg/dL Performed by Juan David Love ([facility]) CBC WITH AUTOMATED DIFF Collection Time: 01/11/21  8:08 AM  
Result Value Ref Range WBC 13.1 (H) 3.6 - 11.0 K/uL  
 RBC 3.03 (L) 3.80 - 5.20 M/uL HGB 8.3 (L) 11.5 - 16.0 g/dL HCT 29.1 (L) 35.0 - 47.0 % MCV 96.0 80.0 - 99.0 FL  
 MCH 27.4 26.0 - 34.0 PG  
 MCHC 28.5 (L) 30.0 - 36.5 g/dL RDW 22.2 (H) 11.5 - 14.5 % PLATELET 041 823 - 323 K/uL MPV 10.2 8.9 - 12.9 FL  
 NRBC 0.0 0  WBC ABSOLUTE NRBC 0.00 0.00 - 0.01 K/uL NEUTROPHILS 78 (H) 32 - 75 % LYMPHOCYTES 11 (L) 12 - 49 % MONOCYTES 8 5 - 13 % EOSINOPHILS 2 0 - 7 % BASOPHILS 0 0 - 1 % IMMATURE GRANULOCYTES 1 (H) 0.0 - 0.5 % ABS. NEUTROPHILS 10.3 (H) 1.8 - 8.0 K/UL  
 ABS. LYMPHOCYTES 1.4 0.8 - 3.5 K/UL  
 ABS. MONOCYTES 1.0 0.0 - 1.0 K/UL  
 ABS. EOSINOPHILS 0.3 0.0 - 0.4 K/UL  
 ABS. BASOPHILS 0.0 0.0 - 0.1 K/UL  
 ABS. IMM. GRANS. 0.1 (H) 0.00 - 0.04 K/UL  
 DF SMEAR SCANNED    
 RBC COMMENTS ANISOCYTOSIS 2+ 
    
 RBC COMMENTS OVALOCYTES PRESENT 
    
 RBC COMMENTS HYPOCHROMIA 1+ METABOLIC PANEL, COMPREHENSIVE Collection Time: 01/11/21  8:08 AM  
Result Value Ref Range Sodium 143 136 - 145 mmol/L Potassium 3.8 3.5 - 5.1 mmol/L Chloride 106 97 - 108 mmol/L  
 CO2 34 (H) 21 - 32 mmol/L Anion gap 3 (L) 5 - 15 mmol/L Glucose 94 65 - 100 mg/dL BUN 23 (H) 6 - 20 MG/DL  Creatinine 1.07 (H) 0.55 - 1.02 MG/DL  
 BUN/Creatinine ratio 21 (H) 12 - 20    
 GFR est AA >60 >60 ml/min/1.73m2 GFR est non-AA 50 (L) >60 ml/min/1.73m2 Calcium 8.2 (L) 8.5 - 10.1 MG/DL Bilirubin, total 0.6 0.2 - 1.0 MG/DL  
 ALT (SGPT) 49 12 - 78 U/L  
 AST (SGOT) 44 (H) 15 - 37 U/L Alk. phosphatase 243 (H) 45 - 117 U/L Protein, total 5.4 (L) 6.4 - 8.2 g/dL Albumin 2.3 (L) 3.5 - 5.0 g/dL Globulin 3.1 2.0 - 4.0 g/dL A-G Ratio 0.7 (L) 1.1 - 2.2 MAGNESIUM Collection Time: 01/11/21  8:08 AM  
Result Value Ref Range Magnesium 2.2 1.6 - 2.4 mg/dL SAMPLES BEING HELD Collection Time: 01/11/21  8:08 AM  
Result Value Ref Range SAMPLES BEING HELD 1SST COMMENT Add-on orders for these samples will be processed based on acceptable specimen integrity and analyte stability, which may vary by analyte. SARS-COV-2 Collection Time: 01/11/21 11:05 AM  
Result Value Ref Range Specimen source Nasopharyngeal    
 Specimen source Nasopharyngeal    
 COVID-19 rapid test Not detected NOTD Specimen type NP Swab Health status Symptomatic Testing GLUCOSE, POC Collection Time: 01/11/21 11:17 AM  
Result Value Ref Range Glucose (POC) 84 65 - 100 mg/dL Performed by Nallely Cortez GLUCOSE, POC Collection Time: 01/11/21  4:21 PM  
Result Value Ref Range Glucose (POC) 152 (H) 65 - 100 mg/dL Performed by Stuart Cortez (PCT) GLUCOSE, POC Collection Time: 01/11/21  9:38 PM  
Result Value Ref Range Glucose (POC) 224 (H) 65 - 100 mg/dL Performed by Kalia Iglesiasing Medications reviewed Current Facility-Administered Medications Medication Dose Route Frequency  digoxin (LANOXIN) tablet 0.125 mg  0.125 mg Oral DAILY  insulin glargine (LANTUS) injection 18 Units  18 Units SubCUTAneous QHS  albuterol-ipratropium (DUO-NEB) 2.5 MG-0.5 MG/3 ML  3 mL Nebulization Q4H RT  
 dilTIAZem ER (CARDIZEM CD) capsule 240 mg  240 mg Oral DAILY  ferrous sulfate tablet 325 mg  1 Tab Oral DAILY WITH BREAKFAST  loperamide (IMODIUM) capsule 2 mg  2 mg Oral BID  nystatin (MYCOSTATIN) 100,000 unit/mL oral suspension 500,000 Units  500,000 Units Oral QID  lipase-protease-amylase (CREON 24,000) capsule 1 Cap  1 Cap Oral TID WITH MEALS  
 apixaban (ELIQUIS) tablet 5 mg  5 mg Oral BID  predniSONE (DELTASONE) tablet 20 mg  20 mg Oral DAILY WITH BREAKFAST  phosphorus (K PHOS NEUTRAL) 250 mg tablet 1 Tab  1 Tab Oral BID  pantoprazole (PROTONIX) tablet 40 mg  40 mg Oral ACB  
 0.9% sodium chloride infusion 250 mL  250 mL IntraVENous PRN  
 0.9% sodium chloride infusion 250 mL  250 mL IntraVENous PRN  
 sodium chloride (NS) flush 5-10 mL  5-10 mL IntraVENous PRN  
 sodium chloride (NS) flush 5-40 mL  5-40 mL IntraVENous Q8H  
 sodium chloride (NS) flush 5-40 mL  5-40 mL IntraVENous PRN  
 acetaminophen (TYLENOL) tablet 650 mg  650 mg Oral Q6H PRN Or  
 acetaminophen (TYLENOL) suppository 650 mg  650 mg Rectal Q6H PRN  polyethylene glycol (MIRALAX) packet 17 g  17 g Oral DAILY PRN  promethazine (PHENERGAN) tablet 12.5 mg  12.5 mg Oral Q6H PRN Or  
 ondansetron (ZOFRAN) injection 4 mg  4 mg IntraVENous Q6H PRN  
 0.9% sodium chloride infusion 250 mL  250 mL IntraVENous PRN  
 glucose chewable tablet 16 g  4 Tab Oral PRN  
 dextrose (D50W) injection syrg 12.5-25 g  25-50 mL IntraVENous PRN  
 glucagon (GLUCAGEN) injection 1 mg  1 mg IntraMUSCular PRN  
 insulin lispro (HUMALOG) injection   SubCUTAneous QID WITH MEALS  cholecalciferol (VITAMIN D3) (1000 Units /25 mcg) tablet 2 Tab  2,000 Units Oral DAILY  cyanocobalamin (VITAMIN B12) tablet 1,000 mcg  1,000 mcg Oral DAILY  DULoxetine (CYMBALTA) capsule 60 mg  60 mg Oral DAILY  metoprolol tartrate (LOPRESSOR) tablet 25 mg  25 mg Oral DAILY PRN  
 mirtazapine (REMERON) tablet 15 mg  15 mg Oral QHS  montelukast (SINGULAIR) tablet 10 mg  10 mg Oral QPM  
 
Care Plan discussed with: Patient, GI and Nurse Total time spent with patient: 30 minutes.  
Sadie Stubbs MD

## 2021-01-12 NOTE — PROGRESS NOTES
Physical Therapy 1050 Pt received in bed, resting with eyes closed. Answered questions tho never opened eyes, Pt personal w/c at bedside, offer OOB to w/c pt did not reply  
and declined to participate with therapy by shaking her head \"no\" Adamaris [place]shire

## 2021-01-12 NOTE — PROGRESS NOTES
Comprehensive Nutrition Assessment Type and Reason for Visit: Hussein Sous Nutrition Recommendations/Plan: 1. Continue with Consistent CHO diet order. Nutrition Assessment:    
1/12: F/u. Pt continues with good appetite and po intake. Lunch tray in room, lasagna and strawberry shortcake eaten. Recorded intakes good, % meals. No c/o N/V. She has no questions or concerns at this time. Labs- BG 98-. Meds- reviewed. 1/5: 69 yo female admitted for pneumonia. PMhx: CAD, CHF, HTN, home O2, DM. Class II obese per BMI of 39. Weight hx in EMR indicates weight has been stable for last 6 months. Spoke with pt who confirmed stable weight, denying any recent weight loss. Consistent CHO diet ordered. PO intakes documented as 75%. Pt states she has a good appetite here and while at home. S/P colonoscopy/EGD secondary to blood loss - found to have esophagitis, gastritis, duodenitis, and diverticulosis. Labs: POC -, HgbA1c 5.9. Meds: Vit D3 and B12, Lantus, Humalog, Creon, Remeron, kphos, Prednisone. Malnutrition Assessment: 
Does not meet criteria at this time. Estimated Daily Nutrient Needs: 
Energy (kcal): 1943(REE 1687 x AF 1.3 - 250); Weight Used for Energy Requirements: Current Protein (g): (0.8-1 gm/kg/d); Weight Used for Protein Requirements: Current Fluid (ml/day): 1943; Method Used for Fluid Requirements: 1 ml/kcal 
 
Nutrition Related Findings:  LBM 1/8; 2+ weeping LE, 1+ UE edema Wounds:   
Partial thickness(elbow) Current Nutrition Therapies: DIET DIABETIC CONSISTENT CARB Regular Anthropometric Measures: 
· Height:  5' 7\" (170.2 cm) · Current Body Wt:  114 kg (251 lb 5.2 oz) · Admission Body Wt:      
· Usual Body Wt:       
· Ideal Body Wt:   :     
· Adjusted Body Weight:   ; Weight Adjustment for: No adjustment · Adjusted BMI:      
· BMI Category:  Obese class 2 (BMI 35.0-39. 9) Nutrition Diagnosis: · No nutrition diagnosis at this time related to   as evidenced by   
 
Nutrition Interventions:  
Food and/or Nutrient Delivery: Continue current diet Nutrition Education and Counseling: No recommendations at this time Coordination of Nutrition Care: Continue to monitor while inpatient Goals: 
Consume > 75% meals within next 5-7 days Nutrition Monitoring and Evaluation:  
Behavioral-Environmental Outcomes:   
Food/Nutrient Intake Outcomes: Food and nutrient intake Physical Signs/Symptoms Outcomes: Biochemical data, GI status, Weight Discharge Planning:   
Continue current diet Electronically signed by Slim Grant RD on 1/12/2021 Contact: [phone]

## 2021-01-12 NOTE — ROUTINE PROCESS
Bedside shift change report given to MADAI Ramirez (oncoming nurse) by Rajeev Echeverria RN (offgoing nurse). Report included the following information SBAR, Kardex, MAR, Accordion and Recent Results.

## 2021-01-13 NOTE — PROGRESS NOTES
Transition of care note: 
 
RUR 44% Plan: 1. Pt was transferred from 5th floor due to pt going into atrial fibrillation with RVR. Heart rate was in the 130s. 2.Cardiology has been consulted. 3.Of note,pt is on 3 liters oxygen @ baseline. 4.As pt was transferred to ICU (is actually step-down status),updated clinicals have been sent through [employer] to admissions @ [facility] where pt plans to return. 5.Covid tests done on 1/1/21,1/8/21 and 1/11/21 all negative. 6.I am checking with admissions @ [facility] to see if another Covid test (rapid versus PCR)is required before pt returns to [facility]. VM left with Raleigh Krueger . 7. [facility] is Nelsy Oas @ [phone] . 8. When pt goes to [facility] will need to go by ambulance transport. TRANSFER - OUT REPORT: 
 
Verbal report given to Lior Torrez on Anju Moreau   being transferred to [facility] for routine progression of care Report consisted of patients Situation, Background, Assessment and  
Recommendations(SBAR).   
 Juan A Hicks

## 2021-01-13 NOTE — PROGRESS NOTES
Daily Progress Note: 1/13/2021 Colletta Hummingbird Hortencia Spearman, MD 
 
Assessment/Plan:  
Left lower lobe pneumonia vrs localized pulmonary edema 
-Rocephin and azithromycin initially then off antibiotics per Pulmonary 
-Covid negative 
-pulmonology following 
  
Acute hypoxic respiratory failure 
-Continue with supplemental oxygen 
-Patient uses 3 L of oxygen at baseline Afib with RVR, fluid overloaded 
- recurrent episodes 
-monitor mag/phos/K+ and replete PRN 
-Cards managing 
- resumed Eliquis 
- lasix/diuretics per Cards 
  
Anemia - acute blood loss on chronic 
-Occult blood positive 
-Transfused 2 units PRBC so far -GI on board- endoscopies 1/4 as under Data Review - Colonic polys path revealed tubular adenomas - follow up pre GI 
  
Sepsis 
-Continue treatment of above 
- culture negative 
  
Elevated creatinine 
-monitor, treat as needed 
  
Hypertension 
-Continue with metoprolol 
  
Type 2 diabetes:  A1c 5.9 
-Patient takes Onglyza which we will hold 
-Continue sliding scale insulin  
-lantus halved to 10 
   
Anxiety/depression 
-Continue with Cymbalta and Remeron 
   
 
Problem List: 
Problem List as of 1/13/2021 Date Reviewed: 11/2/2020 Codes Class Noted - Resolved Left lower lobe pneumonia ICD-10-CM: J18.9 ICD-9-CM: 821  1/1/2021 - Present Leukocytosis ICD-10-CM: M98.801 ICD-9-CM: 288.60  11/3/2020 - Present Chronic respiratory failure with hypoxia [facility]) ICD-10-CM: J96.11 
ICD-9-CM: 518.83, 799.02  11/2/2020 - Present Overview Signed 11/2/2020 10:12 PM by Johanna Swann MD  
  On 3L NC at home Cellulitis of lower extremity ICD-10-CM: L03.119 ICD-9-CM: 682.6  3/23/2020 - Present ASO (arteriosclerosis obliterans) ICD-10-CM: I70.90 ICD-9-CM: 440.9  9/5/2019 - Present PVD (peripheral vascular disease) ([facility] ICD-10-CM: I73.9 ICD-9-CM: 443.9  8/1/2019 - Present  Severe obesity ([facility] ICD-10-CM: E66.01 
 ICD-9-CM: 278.01  7/30/2019 - Present COPD (chronic obstructive pulmonary disease) (HCC) ICD-10-CM: J44.9 ICD-9-CM: 381  7/13/2019 - Present Normocytic anemia ICD-10-CM: D64.9 ICD-9-CM: 285.9  7/13/2019 - Present PAD (peripheral artery disease) (HCC) ICD-10-CM: I73.9 ICD-9-CM: 443.9  7/13/2019 - Present Mild intermittent asthma ICD-10-CM: J45.20 ICD-9-CM: 493.90  5/17/2019 - Present Brachial artery thrombus (HCC) ICD-10-CM: C62.7 ICD-9-CM: 444.21  4/26/2019 - Present Sleep apnea ICD-10-CM: G47.30 ICD-9-CM: 780.57  1/5/2019 - Present Overview Signed 1/5/2019  8:41 PM by Flori Jensen, DO  
  wears CPAP Atrial fibrillation [facility]) ICD-10-CM: I48.91 
ICD-9-CM: 427.31  11/16/2018 - Present Obesity (BMI 30-39.9) (Chronic) ICD-10-CM: Z79.9 ICD-9-CM: 278.00  11/13/2018 - Present Recurrent deep vein thrombosis (DVT) (HCC) ICD-10-CM: I82.409 ICD-9-CM: 453.40  3/30/2018 - Present Chronic anticoagulation (Chronic) ICD-10-CM: Z79.01 
ICD-9-CM: V58.61  3/30/2018 - Present Essential hypertension (Chronic) ICD-10-CM: I10 
ICD-9-CM: 401.9  1/22/2016 - Present CAD (coronary artery disease) ICD-10-CM: I25.10 ICD-9-CM: 414.00  10/9/2015 - Present Type II diabetes mellitus (HCC) (Chronic) ICD-10-CM: E11.9 ICD-9-CM: 250.00  10/9/2015 - Present RESOLVED: Syncope and collapse ICD-10-CM: R55 
ICD-9-CM: 780.2  9/29/2020 - 11/2/2020 RESOLVED: Cellulitis ICD-10-CM: L03.90 ICD-9-CM: 682.9  3/23/2020 - 5/29/2020 RESOLVED: Hypokalemia ICD-10-CM: E87.6 ICD-9-CM: 276.8  3/23/2020 - 5/29/2020 RESOLVED: Hyponatremia ICD-10-CM: E87.1 ICD-9-CM: 276.1  3/23/2020 - 5/29/2020 RESOLVED: Diarrhea ICD-10-CM: R19.7 ICD-9-CM: 787.91  3/23/2020 - 5/29/2020 RESOLVED: Syncope and collapse ICD-10-CM: R55 
ICD-9-CM: 780.2  7/13/2019 - 5/29/2020 RESOLVED: UTI (urinary tract infection) ICD-10-CM: N39.0 ICD-9-CM: 599.0  7/13/2019 - 11/2/2020 RESOLVED: Sepsis ([facility].) ICD-10-CM: A41.9 ICD-9-CM: 038.9, 995.91  7/13/2019 - 11/3/2020 RESOLVED: Leg wound, left ICD-10-CM: G05.007M ICD-9-CM: 891.0  7/13/2019 - 5/29/2020 RESOLVED: Leg laceration, right, initial encounter ICD-10-CM: B09.864V ICD-9-CM: 894.0  7/13/2019 - 5/29/2020 RESOLVED: Cellulitis of right upper arm ICD-10-CM: L03.113 ICD-9-CM: 682.3  5/17/2019 - 5/29/2020 RESOLVED: Gangrene of finger of right hand ([facility] ICD-10-CM: A71 
ICD-9-CM: 785.4  5/17/2019 - 11/2/2020 RESOLVED: Bowel obstruction ([facility] ICD-10-CM: F43.718 ICD-9-CM: 560.9  1/8/2019 - 5/29/2020 RESOLVED: Partial small bowel obstruction ([facility] ICD-10-CM: K56.600 ICD-9-CM: 560.9  1/5/2019 - 5/29/2020 RESOLVED: Dyspnea ICD-10-CM: R06.00 
ICD-9-CM: 786.09  11/13/2018 - 5/29/2020 RESOLVED: ARF (acute renal failure) (HCC) ICD-10-CM: N17.9 ICD-9-CM: 584.9  11/13/2018 - 5/29/2020 RESOLVED: Closed wedge compression fracture of T7 vertebra ([facility] ICD-10-CM: Z96.420Y ICD-9-CM: 805.2  11/13/2018 - 5/29/2020 RESOLVED: Type 2 diabetes with nephropathy ([facility] ICD-10-CM: E11.21 
ICD-9-CM: 250.40, 583.81  10/1/2018 - 11/2/2020 RESOLVED: Chest pain ICD-10-CM: R07.9 ICD-9-CM: 786.50  6/27/2018 - 5/29/2020 RESOLVED: Abdominal pain ICD-10-CM: R10.9 ICD-9-CM: 789.00  6/27/2018 - 5/29/2020 RESOLVED: Coronary artery disease involving native coronary artery without angina pectoris (Chronic) ICD-10-CM: I25.10 ICD-9-CM: 414.01  1/22/2016 - 11/2/2020 RESOLVED: HTN (hypertension) ICD-10-CM: I10 
ICD-9-CM: 401.9  10/9/2015 - 1/22/2016 RESOLVED: NSTEMI (non-ST elevated myocardial infarction) ([facility] ICD-10-CM: I21.4 ICD-9-CM: 410.70  10/9/2015 - 5/29/2020 Subjective: Semaj Mora is a 70 y.o. female who presented with shortness of breath which has been progressively worsening since this weekend. She reports having by cough and sinus congestion. Symptoms acutely worsened this morning. She denies any chest pain, abdominal pain, diarrhea, or fever. She has felt chills. Upon arrival of EMS she was found to have saturations in the 70s. She has a history of chronic COPD and uses 3 L of oxygen at baseline. [Dr Nabeel Beasley 1/2: Got 2 units PRBCs, but follow up labs unable to be obtained for the past 12 hours. I spoke with anesthesia and they will access her for central line. Pt tolerated PO, +BM yesterday. Says her legs are no more swollen than her baseline. 1/3: Sugar was in 50s before bed last night. Held bedtime humalog and halved lantus. Sugars better this AM.  Now in Afib with RVR. Responded only briefly to dilt push. Pt feels the tachycardia. Denies increased SOB or CP. Tolerated PO yesterday. Due for bowel prep today, but on hold until cardiovascularly more stable. 1/4:  She reports she feels better this AM.  Currently in sinus rhythm at this moment. She is going to endoscopy this AM.  Hb up to 8.6. WBC 14.4. Will decrease Prednisone to 20mg per day. 1/5:  Continues to feel better but still very fatigued. Remains in sinus rhythm at this moment. Upper and lower endoscopies as under Data Review. Eliquis resumed. 1/6:  About the same with no specific complaints except \"feel weak all over. \"  Back in A Fib but rate ok in 90s to low 100s. Hb stable at 8.5. She is willing to go to rehab so will get Case Management to see. 1/7:  No specific complaints. Hb 8.3. Cards reported irreg rate more of PACs than A fib at this time. Repeat CXR today. Stable for rehab when bed found. Tubular adenomas on colon polyp path - follow up per GI. 1/8:  No specific complaints. Still very weak, cannot get up on her own and I do not think she can manage at assisted living without 24/7 care at this point. CXR yesterday without progression. She declined rehab yesterday but after extended discussion she would agree to go to [facility]. PT still recommending rehab. She is basically at baseline except her weakness. 1/9:  She reports she is feeling better. She wants to try to get up to chair today. Hb down from 9.0 to 7.2 this AM - recheck later today and transfuse if needed. 1/10:  Other than fatigue and weakness she reports no complaints. She sat up on edge of bed yesterday. Pulled IJ out last night and nurses report unable to get labs. Pt requests no labs unless absolutely necessary. Recheck of Hb yesterday PM was 8.9 so will hold off on labs for now. Stable for rehab.   
 
1/11:  Reports she feels back to usual this AM.  Another episode of A fib with RVR last night - Cards restarted Dig, gave IV bolus of Cardizem and she allowed labs yesterday and converted back to sinus. Hb down a bit at 7.7 yesterday. AM labs not drawn yet. 1/12:  Little change from yesterday she reports. Still c/o fatigue and weakness. Rate down to 80s. Remains afebrile. Repeat Covid negative. Hb yesterday AM 8.3. If this AM Hb is ok she could go to [facility] later today. Pt reports she feels ready to go to rehab.    
 
1/13:  She feels the rapid heart beat but no chest pain or shortness of breath. Recurrent Afib with RVR yesterday that was unable to be resolved on floor and transferred to step down unit. Mag and K+ ok. Rate in 130s to 150s this AM at the time of my visit. Cards is seeing her now. Review of Systems: A comprehensive review of systems was negative except for that written in the HPI. Objective:  
Physical Exam:  
Visit Vitals /88 (BP 1 Location: Right arm, BP Patient Position: At rest) Pulse (!) 105 Temp 97.6 °F (36.4 °C) Resp 18 Ht 5' 7\" (1.702 m) Wt 114 kg (251 lb 6.4 oz) SpO2 99% BMI 39.37 kg/m² O2 Flow Rate (L/min): 3 l/min O2 Device: Nasal cannula Temp (24hrs), Av.8 °F (36.6 °C), Min:97.5 °F (36.4 °C), Max:98.2 °F (36.8 °C) No intake/output data recorded.  1901 -  0700 In: 340 [P.O.:340] Out: - General:  Alert, cooperative, no distress, appears stated age, moon-face. Head:  Normocephalic, without obvious abnormality, atraumatic. Eyes:  Conjunctivae/corneas clear. EOMs intact. Throat: Lips, mucosa, and tongue moist.  
Neck: Supple, symmetrical, trachea midline, no adenopathy, thyroid: no enlargement/tenderness/nodules, no carotid bruit and no JVD. Lungs:    diminished at bases, a few scattered rhonchi Chest wall:  No tenderness or deformity. Heart:   irreg with rate in 80s at this time - monitor shows sinus. no murmur, click, rub or gallop. Abdomen:   Soft, non-tender. Bowel sounds normal. No masses,  No organomegaly. 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 Arms/hands much less swollen. No calf tenderness or cords. Pulses: 2+ and symmetric all extremities. Skin: Skin color, texture, turgor normal. No rashes Neurologic:   Alert and oriented X 3.  equal.  Gait not tested at this time. Sensation grossly normal to touch. Gross motor of extremities normal.    
 
Data Review:  
  Esophagogastroduodenoscopy (EGD) Colonoscopy Procedure Note Tanya Mendoza : 1945 [address] [place] [address] Record Number: [id number] 
  
  
Indications:    Iron deficiency anemia, diarrhea Referring Physician:  Mariya Huerta MD 
Anesthesia/Sedation: see nursing notes Endoscopist:  Dr. Amberly Ni Assistants: None Procedure in Detail: After obtaining informed consent, positioning of the patient in the left lateral decubitus position, and conduction of a pre-procedure pause or \"time out\" the endoscope was introduced into the mouth and advanced to the duodenum. A careful inspection was made, and findings or interventions are described below. Findings:  
Esophagus:numerous white exudates Stomach: antral erythema with soft submucosal mass Normal body and fundus Duodenum/jejunum: erythema, erosions second into third portion Complications/estimated blood loss: none Therapies:  biopsy of stomach antrum at site submucosal mass 
biopsy of duodenal second portion, third portion Specimens: biopsies Implants:none Endoscopist:  Dr. Mona Alan Assistants: None Complications:  None Estimate Blood Loss:  None 
  
Colonoscopy Procedure in Detail: After obtaining informed consent, positioning of the patient in the left lateral decubitus position, and conduction of a pre-procedure pause or \"time out\" the endoscope was introduced into the anus and advanced to the terminal ileum. The quality of the colonic preparation was adequate. A careful inspection was made as the colonoscope was withdrawn, findings and interventions are described below. Appendiceal orifice photographed Findings: - Diverticulosis Two sessile 12 mm polyps Specimens:  
 polyps removed cold snare; random biopsies cecum, ascending Implants: none Complications:  
None; patient tolerated the procedure well. Estimated blood loss: none Impression: 
Candida esophagitis, gastritis, duodenitis, colon polyps, diverticulosis. Antral mass likely a benign lipoma Recommendations: - Await pathology. - anticoagulate tomorrow Resume diet; add creon, nystatin suspension Thank you for entrusting me with this patient's care. Please do not hesitate to contact me with any questions or if I can be of assistance with any of your other patients' GI needs. Signed By: Amrit Rain MD 
                      January 4, 2021 Port CXR 1/7/21 IMPRESSION: Unchanged left basilar airspace disease and small left pleural 
Effusion Recent Days: 
Recent Labs  
  01/12/21 
2315 01/11/21 
0808 01/10/21 
2040 WBC 13.0* 13.1* 13.0* HGB 7.8* 8.3* 7.7* HCT 26.8* 29.1* 27.0*  
 273 256 Recent Labs  
  01/12/21 
2315 01/11/21 
0808 01/10/21 
2040 01/10/21 
0945  143 141 141  
K 4.3 3.8 4.1 3.7  106 105 107 CO2 30 34* 32 30 * 94 187* 79 BUN 24* 23* 22* 19  
CREA 1.04* 1.07* 1.21* 0.95  
CA 8.1* 8.2* 7.8* 8.1*  
MG 2.1 2.2  --   --   
ALB  --  2.3* 2.1* 2.2* ALT  --  49 40 35 No results for input(s): PH, PCO2, PO2, HCO3, FIO2 in the last 72 hours. 24 Hour Results: 
Recent Results (from the past 24 hour(s)) GLUCOSE, POC Collection Time: 01/12/21 11:07 AM  
Result Value Ref Range Glucose (POC) 82 65 - 100 mg/dL Performed by Son Jewell ([facility]) GLUCOSE, POC Collection Time: 01/12/21  4:05 PM  
Result Value Ref Range Glucose (POC) 175 (H) 65 - 100 mg/dL Performed by Son Jewell ([facility]) GLUCOSE, POC Collection Time: 01/12/21  9:44 PM  
Result Value Ref Range Glucose (POC) 138 (H) 65 - 100 mg/dL Performed by [facility] METABOLIC PANEL, BASIC Collection Time: 01/12/21 11:15 PM  
Result Value Ref Range Sodium 140 136 - 145 mmol/L Potassium 4.3 3.5 - 5.1 mmol/L Chloride 107 97 - 108 mmol/L  
 CO2 30 21 - 32 mmol/L Anion gap 3 (L) 5 - 15 mmol/L Glucose 126 (H) 65 - 100 mg/dL BUN 24 (H) 6 - 20 MG/DL Creatinine 1.04 (H) 0.55 - 1.02 MG/DL  
 BUN/Creatinine ratio 23 (H) 12 - 20 GFR est AA >60 >60 ml/min/1.73m2 GFR est non-AA 52 (L) >60 ml/min/1.73m2 Calcium 8.1 (L) 8.5 - 10.1 MG/DL  
CBC WITH AUTOMATED DIFF Collection Time: 01/12/21 11:15 PM  
Result Value Ref Range WBC 13.0 (H) 3.6 - 11.0 K/uL  
 RBC 2.79 (L) 3.80 - 5.20 M/uL HGB 7.8 (L) 11.5 - 16.0 g/dL HCT 26.8 (L) 35.0 - 47.0 % MCV 96.1 80.0 - 99.0 FL  
 MCH 28.0 26.0 - 34.0 PG  
 MCHC 29.1 (L) 30.0 - 36.5 g/dL RDW 22.0 (H) 11.5 - 14.5 % PLATELET 444 693 - 936 K/uL MPV 10.5 8.9 - 12.9 FL  
 NRBC 0.0 0  WBC ABSOLUTE NRBC 0.00 0.00 - 0.01 K/uL NEUTROPHILS 87 (H) 32 - 75 % LYMPHOCYTES 6 (L) 12 - 49 % MONOCYTES 6 5 - 13 % EOSINOPHILS 0 0 - 7 % BASOPHILS 0 0 - 1 % IMMATURE GRANULOCYTES 1 (H) 0.0 - 0.5 % ABS. NEUTROPHILS 11.3 (H) 1.8 - 8.0 K/UL  
 ABS. LYMPHOCYTES 0.8 0.8 - 3.5 K/UL  
 ABS. MONOCYTES 0.8 0.0 - 1.0 K/UL  
 ABS. EOSINOPHILS 0.0 0.0 - 0.4 K/UL  
 ABS. BASOPHILS 0.0 0.0 - 0.1 K/UL  
 ABS. IMM. GRANS. 0.1 (H) 0.00 - 0.04 K/UL  
 DF SMEAR SCANNED    
 RBC COMMENTS ANISOCYTOSIS 
2+ MAGNESIUM Collection Time: 01/12/21 11:15 PM  
Result Value Ref Range Magnesium 2.1 1.6 - 2.4 mg/dL Medications reviewed Current Facility-Administered Medications Medication Dose Route Frequency  dilTIAZem (CARDIZEM) 100 mg in 0.9% sodium chloride (MBP/ADV) 100 mL infusion  0-15 mg/hr IntraVENous TITRATE  digoxin (LANOXIN) tablet 0.125 mg  0.125 mg Oral DAILY  insulin glargine (LANTUS) injection 18 Units  18 Units SubCUTAneous QHS  albuterol-ipratropium (DUO-NEB) 2.5 MG-0.5 MG/3 ML  3 mL Nebulization Q4H RT  
 dilTIAZem ER (CARDIZEM CD) capsule 240 mg  240 mg Oral DAILY  ferrous sulfate tablet 325 mg  1 Tab Oral DAILY WITH BREAKFAST  loperamide (IMODIUM) capsule 2 mg  2 mg Oral BID  nystatin (MYCOSTATIN) 100,000 unit/mL oral suspension 500,000 Units  500,000 Units Oral QID  lipase-protease-amylase (CREON 24,000) capsule 1 Cap  1 Cap Oral TID WITH MEALS  
 apixaban (ELIQUIS) tablet 5 mg  5 mg Oral BID  predniSONE (DELTASONE) tablet 20 mg  20 mg Oral DAILY WITH BREAKFAST  phosphorus (K PHOS NEUTRAL) 250 mg tablet 1 Tab  1 Tab Oral BID  pantoprazole (PROTONIX) tablet 40 mg  40 mg Oral ACB  0.9% sodium chloride infusion 250 mL  250 mL IntraVENous PRN  
 0.9% sodium chloride infusion 250 mL  250 mL IntraVENous PRN  
 sodium chloride (NS) flush 5-10 mL  5-10 mL IntraVENous PRN  
 sodium chloride (NS) flush 5-40 mL  5-40 mL IntraVENous Q8H  
 sodium chloride (NS) flush 5-40 mL  5-40 mL IntraVENous PRN  
 acetaminophen (TYLENOL) tablet 650 mg  650 mg Oral Q6H PRN Or  
 acetaminophen (TYLENOL) suppository 650 mg  650 mg Rectal Q6H PRN  polyethylene glycol (MIRALAX) packet 17 g  17 g Oral DAILY PRN  promethazine (PHENERGAN) tablet 12.5 mg  12.5 mg Oral Q6H PRN Or  
 ondansetron (ZOFRAN) injection 4 mg  4 mg IntraVENous Q6H PRN  
 0.9% sodium chloride infusion 250 mL  250 mL IntraVENous PRN  
 glucose chewable tablet 16 g  4 Tab Oral PRN  
 dextrose (D50W) injection syrg 12.5-25 g  25-50 mL IntraVENous PRN  
 glucagon (GLUCAGEN) injection 1 mg  1 mg IntraMUSCular PRN  
 insulin lispro (HUMALOG) injection   SubCUTAneous QID WITH MEALS  cholecalciferol (VITAMIN D3) (1000 Units /25 mcg) tablet 2 Tab  2,000 Units Oral DAILY  cyanocobalamin (VITAMIN B12) tablet 1,000 mcg  1,000 mcg Oral DAILY  DULoxetine (CYMBALTA) capsule 60 mg  60 mg Oral DAILY  metoprolol tartrate (LOPRESSOR) tablet 25 mg  25 mg Oral DAILY PRN  
 mirtazapine (REMERON) tablet 15 mg  15 mg Oral QHS  montelukast (SINGULAIR) tablet 10 mg  10 mg Oral QPM  
 
Care Plan discussed with: Patient, GI and Nurse Total time spent with patient: 30 minutes.  
Sadie Stubbs MD

## 2021-01-13 NOTE — PROGRESS NOTES
5: TRANSFER - IN REPORT: 
 
Verbal report received from Linda Chandra RN(name) on Angelica Guillen  being received from 5th (unit) for change in patient condition(Afib) Report consisted of patients Situation, Background, Assessment and  
Recommendations(SBAR). Information from the following report(s) SBAR, Kardex, ED Summary, Procedure Summary, Intake/Output, MAR, Accordion, Recent Results, Med Rec Status, Cardiac Rhythm Afib and Alarm Parameters  was reviewed with the receiving nurse. Opportunity for questions and clarification was provided. Assessment completed upon patients arrival to unit and care assumed. Primary Nurse Bhargav Velasco and [id number], RN performed a dual skin assessment on this patient Impairment noted- see wound doc flow sheet Darrion score is 14 Pt in bed, no needs at this time. 0700: Bedside shift change report given to SUSIE Rodriguez RN (oncoming nurse) by Jose Estrada RN (offgoing nurse). Report included the following information SBAR, Kardex, ED Summary, Procedure Summary, Intake/Output, MAR, Accordion, Recent Results, Med Rec Status, Cardiac Rhythm Afib and Alarm Parameters .

## 2021-01-13 NOTE — PROGRESS NOTES
1250: Pt converted to NSR, EKG performed. Wilbern Neighbor NP aware & visualized EKG, stated OK to turn off dilt gtt @ this time. 1900: Bedside and Verbal shift change report given to Gloria Skinner (oncoming nurse) by Ursula AJ (offgoing nurse). Report included the following information SBAR, Kardex, Intake/Output, Recent Results and Cardiac Rhythm NSR.

## 2021-01-13 NOTE — PROGRESS NOTES
Cardiology Progress Note       ICU  
NAME:  Pernell Carter :   1945 MRN:   [id number] Assessment/Plan: 1. Paroxysmal a fib: recurrent RVR overnight resulting is resumption of IV dilt and transfer to ICU. BP soft on dilt 5 mg hourly. Will bolus with amio and start IV. OAC> eliquis. Will plan to use amio only short term. Ck TSH. Consider dig IV if no improvement in rate with amio. 2. HF p EF: euvolemic. 3. DM:  
4. COPD: baseline oxygen requirements 3 liters at home. 5. HTN 
6. Obesity: Body mass index is 39.37 kg/m². 7. Anemia:   
 
Pt personally seen and examined. Chart reviewed. Agree with advanced NP's history, exam and  A/P with changes/additons. CVS-S1-S2 present,Irr   2/6 systolic murmur present Afib with RVR - On IV Cardizem gtt - BP soft. Amio/Dig Discussed with patient/nursing Reid Renner MD, [facility] [place] Subjective:  
Ms. Sukhjinder Michelle is a 71 yo with history of Afib, CAD, HFpEF, DM, Recurrent DVT, COPD on 3L NC at home, PAD s/p left fem pop,  Asthma, HTN and Obesity who is admitted with Acute resp failure, LLL PNA, Anemia and GI Bleed (occult stool + blood),  
 
 
  
 
 
 
Cardiac ROS: Patient denies any exertional chest pain, dyspnea, palpitations, syncope, orthopnea, edema or paroxysmal nocturnal dyspnea. Previous Cardiac Eval 
Lipids 8/10/18: , HDL 95, HDL 3.1, , VLDL 91 Cardiac Testing/ Procedures: 
  
A. Cardiac Cath/PCI: 18 At [facility] - LAD stent/PTCA of D1 
  
10/9/15 L Main: Medl;Nml 
  
LAD: Prox- Med; 50%; Distal - small; D1 - Small to med - prox 60% 
  
LCflex: Large; Tortuous; MLI; GUILLERMO - med - MLI 
  
RCA: Med; Prox 80%; Distal 95% just before bifurcation into small PDA and PLB 
  
LVEDP: 22 
  
LVEF: 55%/ Mild basal inf hypokinesis 
  
No significant gradient across aortic valve. 
  
PCI: JR4 guide/BMW Pre dil with 2 by 12 balloon BMS 2.25 by 22 deployed at high [place] distally BMS 2.5 by 18 deployed at high [place] proximal lesion Post dil with 2.5 by 15 
  
  
B. ECHO/MEE: 2018 - Left ventricle: Systolic function was normal. Ejection fraction was 
estimated to be 60 %. There were no regional wall motion abnormalities. Doppler parameters were consistent with abnormal left ventricular 
relaxation (grade 1 diastolic dysfunction). Aortic valve: Leaflets exhibited normal cuspal separation and good 
mobility. Not well visualized. 
  
10/11/15 - Left ventricle: Systolic function was vigorous. Ejection fraction was 
estimated in the range of 65 % to 70 %. There were no regional wall motion 
abnormalities. 
  
C. StressNuclear/Stress ECHO/Stress test: 
  
D. Vascular: 
  
E. EP: 
  
F. Miscellaneous: 
  
Cardaic cath at [address] [place] [address] 18: radial - SAMUEL to ALD and ballon angioplasty to 2nd dx. Dr Mili Villalba. Echo 6/3/18 LVEF 65-70% no RWMA. Mild LVH. Mild calcified annulus Review of Systems: No nausea, indigestion, vomiting, pain, cough, sputum. No bleeding. Taking po. Appetite ok. Objective:  
 
Visit Vitals /73 Pulse (!) 130 Temp 97.6 °F (36.4 °C) Resp 18 Ht 5' 7\" (1.702 m) Wt 114 kg (251 lb 6.4 oz) SpO2 99% BMI 39.37 kg/m² O2 Flow Rate (L/min): 3 l/min O2 Device: Nasal cannula Temp (24hrs), Av.8 °F (36.6 °C), Min:97.5 °F (36.4 °C), Max:98.2 °F (36.8 °C) No intake/output data recorded.  1901 -  0700 In: 340 [P.O.:340] Out: -  
TELE: a fib 's General: AAOx3 cooperative, no acute distress. HEENT: Atraumatic. Pink and moist.  Anicteric sclerae. Neck : Supple, no thyromegaly. Lungs: Dim bases Heart: tachycardic, irregular Regular rhythm, no murmur, no rubs, no gallops. No JVD. No carotid bruits. Abdomen: Soft, non-distended, non-tender. + Bowel sounds. Extremities: No edema Neurologic: Grossly intact. Alert and oriented X 3. No acute neurological distress. Psych: Good insight. Not anxious or agitated. Care Plan discussed with: 
  Comments Patient Family RN Care Manager Consultant:     
 
 
Data Review: No lab exists for component: ITNL No results for input(s): CPK, CKMB, TROIQ in the last 72 hours. Recent Labs  
  01/12/21 
2315 01/11/21 
0808 01/10/21 
2040 01/10/21 
0945  143 141 141  
K 4.3 3.8 4.1 3.7  106 105 107 CO2 30 34* 32 30 BUN 24* 23* 22* 19  
CREA 1.04* 1.07* 1.21* 0.95 * 94 187* 79 MG 2.1 2.2  --   --   
ALB  --  2.3* 2.1* 2.2* WBC 13.0* 13.1* 13.0* 14.1* HGB 7.8* 8.3* 7.7* 8.4* HCT 26.8* 29.1* 27.0* 28.8*  
 273 256 278 No results for input(s): INR, PTP, APTT, INREXT in the last 72 hours. Medications reviewed Current Facility-Administered Medications Medication Dose Route Frequency  dilTIAZem (CARDIZEM) 100 mg in 0.9% sodium chloride (MBP/ADV) 100 mL infusion  0-15 mg/hr IntraVENous TITRATE  amiodarone (CORDARONE) 150 mg in dextrose 5% 100 mL bolus infusion  150 mg IntraVENous ONCE  
 amiodarone (CORDARONE) 375 mg in dextrose 5% 250 mL infusion  0.5-1 mg/min IntraVENous TITRATE  digoxin (LANOXIN) tablet 0.125 mg  0.125 mg Oral DAILY  insulin glargine (LANTUS) injection 18 Units  18 Units SubCUTAneous QHS  albuterol-ipratropium (DUO-NEB) 2.5 MG-0.5 MG/3 ML  3 mL Nebulization Q4H RT  
 dilTIAZem ER (CARDIZEM CD) capsule 240 mg  240 mg Oral DAILY  ferrous sulfate tablet 325 mg  1 Tab Oral DAILY WITH BREAKFAST  loperamide (IMODIUM) capsule 2 mg  2 mg Oral BID  nystatin (MYCOSTATIN) 100,000 unit/mL oral suspension 500,000 Units  500,000 Units Oral QID  lipase-protease-amylase (CREON 24,000) capsule 1 Cap  1 Cap Oral TID WITH MEALS  
 apixaban (ELIQUIS) tablet 5 mg  5 mg Oral BID  predniSONE (DELTASONE) tablet 20 mg  20 mg Oral DAILY WITH BREAKFAST  phosphorus (K PHOS NEUTRAL) 250 mg tablet 1 Tab  1 Tab Oral BID  pantoprazole (PROTONIX) tablet 40 mg  40 mg Oral ACB  0.9% sodium chloride infusion 250 mL  250 mL IntraVENous PRN  
 0.9% sodium chloride infusion 250 mL  250 mL IntraVENous PRN  
 sodium chloride (NS) flush 5-10 mL  5-10 mL IntraVENous PRN  
 sodium chloride (NS) flush 5-40 mL  5-40 mL IntraVENous Q8H  
 sodium chloride (NS) flush 5-40 mL  5-40 mL IntraVENous PRN  
 acetaminophen (TYLENOL) tablet 650 mg  650 mg Oral Q6H PRN Or  
 acetaminophen (TYLENOL) suppository 650 mg  650 mg Rectal Q6H PRN  polyethylene glycol (MIRALAX) packet 17 g  17 g Oral DAILY PRN  promethazine (PHENERGAN) tablet 12.5 mg  12.5 mg Oral Q6H PRN Or  
 ondansetron (ZOFRAN) injection 4 mg  4 mg IntraVENous Q6H PRN  
 0.9% sodium chloride infusion 250 mL  250 mL IntraVENous PRN  
 glucose chewable tablet 16 g  4 Tab Oral PRN  
 dextrose (D50W) injection syrg 12.5-25 g  25-50 mL IntraVENous PRN  
 glucagon (GLUCAGEN) injection 1 mg  1 mg IntraMUSCular PRN  
 insulin lispro (HUMALOG) injection   SubCUTAneous QID WITH MEALS  cholecalciferol (VITAMIN D3) (1000 Units /25 mcg) tablet 2 Tab  2,000 Units Oral DAILY  cyanocobalamin (VITAMIN B12) tablet 1,000 mcg  1,000 mcg Oral DAILY  DULoxetine (CYMBALTA) capsule 60 mg  60 mg Oral DAILY  metoprolol tartrate (LOPRESSOR) tablet 25 mg  25 mg Oral DAILY PRN  
 mirtazapine (REMERON) tablet 15 mg  15 mg Oral QHS  montelukast (SINGULAIR) tablet 10 mg  10 mg Oral QPM  
 
 
 
Nestora Holiday, NP

## 2021-01-13 NOTE — PROGRESS NOTES
TRANSFER - OUT REPORT: 
 
Verbal report given to MADAI Heart(name) on Jos Edward  being transferred to ICU(unit) for urgent transfer Report consisted of patients Situation, Background, Assessment and  
Recommendations(SBAR). Information from the following report(s) SBAR, Kardex, MAR, Accordion and Cardiac Rhythm a-fib was reviewed with the receiving nurse. Lines:  
Peripheral IV 01/10/21 Anterior; Left Forearm (Active) Site Assessment Clean, dry, & intact 01/12/21 2030 Phlebitis Assessment 0 01/12/21 2030 Infiltration Assessment 0 01/12/21 2030 Dressing Status Clean, dry, & intact 01/12/21 2030 Dressing Type Transparent 01/12/21 2030 Hub Color/Line Status Blue;Capped 01/12/21 2030 Action Taken Open ports on tubing capped 01/12/21 2030 Alcohol Cap Used Yes 01/12/21 2030 Opportunity for questions and clarification was provided. Patient transported with: 
 Monitor O2 @ 3 liters Registered Nurse

## 2021-01-13 NOTE — PROGRESS NOTES
Bedside shift change report given to MADAI Ramirez (oncoming nurse) by Alma Gay RN (offgoing nurse). Report included the following information SBAR, Kardex, MAR, Accordion and Recent Results.

## 2021-01-13 NOTE — PROGRESS NOTES
TRANSFER - OUT REPORT: 
 
EMR reviewed  On Skyler Rodrigez   being transferred to ICU  for urgent transfer Report consisted of patients Situation, Background, Assessment and  
Recommendations(SBAR).   
   
Trista Borrero

## 2021-01-13 NOTE — PROGRESS NOTES
Ultrasound IV by ED Staff Procedure Note Patient meets criteria for US IV insertion. Ultrasound IV verbal education provided to patient. Opportunities for questions given. IV ultrasound technique used for PIV placement: 
20 gauge BD Nexiva LUE location. 1 X Attempt(s). Procedure tolerated well. IV placement added to LDA.   
 
                           Azra Linn RN

## 2021-01-13 NOTE — PROGRESS NOTES
Problem: Mobility Impaired (Adult and Pediatric) Goal: *Acute Goals and Plan of Care (Insert Text) Description: FUNCTIONAL STATUS PRIOR TO ADMISSION: Patient was modified independent using a rolling walker for functional mobility. HOME SUPPORT PRIOR TO ADMISSION: The patient lived with daughter and grand-daughter but did not require assist. 
 
Physical Therapy Goals Revised 1/13/2021 Continue all goals for an additional 7 days. Initiated 1/5/2021 1. Patient will move from supine to sit and sit to supine  in bed with moderate assistance  within 7 day(s). 2.  Patient will transfer from bed to chair and chair to bed with moderate assistance  using the least restrictive device within 7 day(s). 3.  Patient will perform sit to stand with moderate assistance  within 7 day(s). 4.  Patient will ambulate with moderate assistance  for 50 feet with the least restrictive device within 7 day(s). Outcome: Progressing Towards Goal 
Note: PHYSICAL THERAPY TREATMENT: WEEKLY REASSESSMENT Patient: Pa Simeon (53 y.o. female) Date: 1/13/2021 Primary Diagnosis: Left lower lobe pneumonia [J18.9] Procedure(s) (LRB): ESOPHAGOGASTRODUODENOSCOPY (EGD) (N/A) COLONOSCOPY (N/A) ESOPHAGOGASTRODUODENAL (EGD) BIOPSY (N/A) COLON BIOPSY (N/A) ENDOSCOPIC POLYPECTOMY (N/A) 9 Days Post-Op Precautions:   Fall, WBAT 
 
 
ASSESSMENT Patient continues with skilled PT services and is not progressing towards goals. Patient still with slow progress, although today agreeable ot sit at edge of bed. She was able to sit at edge of bed and then perform 2 short bouts of standing to change linens due to episode of incontinence. She was unable to side steps at edge of bed. She was returned to supine with vitals stable and nursing present. Patient's progression toward goals since last assessment: MOD A x2 for rolling, MAX A for supine-sit, MAX A x2 for sit-supine, MAX A for sit-stand Current Level of Function Impacting Discharge (mobility/balance): decreased activity tolerance, continued medical complexity and required trasnfer to ICU overnight due to episode of a-fib. Now back in sinus rhythm Functional Outcome Measure: The patient scored 15/100 on the barthel index outcome measure. Other factors to consider for discharge: PLAN : 
Goals have been updated based on progression since last assessment. Patient continues to benefit from skilled intervention to address the above impairments. Recommendations and Planned Interventions: bed mobility training, transfer training, gait training, therapeutic exercises, and therapeutic activities Frequency/Duration: Patient will be followed by physical therapy:  5 times a week to address goals. Recommendation for discharge: (in order for the patient to meet his/her long term goals) Therapy up to 5 days/week in SNF setting This discharge recommendation: A follow-up discussion with the attending provider and/or case management is planned IF patient discharges home will need the following DME: to be determined (TBD) SUBJECTIVE:  
Patient stated . OBJECTIVE DATA SUMMARY:  
HISTORY:   
Past Medical History:  
Diagnosis Date Asthma Atrial fibrillation ([address] 11/16/2018 Autoimmune disease ([address]   
 fibromyalgia CAD (coronary artery disease) 10/9/2015 Closed wedge compression fracture of T7 vertebra ([address] 11/13/2018 COPD (chronic obstructive pulmonary disease) ([address] Diabetes ([address] DVT (deep vein thrombosis) in pregnancy GERD (gastroesophageal reflux disease) Heart failure ([address] History of vascular access device 09/30/2020  
 4f midline, 12cm at 1cm out placed in L Cephalic by Franklin Billingsley, MADAI  
 HTN (hypertension) NSTEMI (non-ST elevated myocardial infarction) ([address] 10/9/2015 Obesity (BMI 30-39.9) 11/13/2018 PAD (peripheral artery disease) ([address] prior stenting Sleep apnea   
 wears CPAP Statin intolerance Past Surgical History:  
Procedure Laterality Date COLONOSCOPY N/A 1/4/2021 COLONOSCOPY performed by Rosie Max MD at [address] [facility] HX COLOSTOMY    
 and reversal, post episiotomy repair [facility] UROLOGICAL  2009  
 bladder sling IR KYPHOPLASTY LUMBAR  10/8/2020 SD ABDOMEN SURGERY PROC UNLISTED    
 hital hernia repair, gall bladder removed SD AMPUTATION TRANSMETACARPAL Right 06/12/2019  
 entire ring finger, 2nd & 3rd fingers (transmetacarpal) SD BREAST SURGERY PROCEDURE UNLISTED    
 lumpectomy SD CARDIAC SURG PROCEDURE UNLIST  10/9/15  
 2 stents Personal factors and/or comorbidities impacting plan of care: see above Home Situation Home Environment: Private residence # Steps to Enter: 0 Wheelchair Ramp: No 
One/Two Story Residence: One story Living Alone: Yes Support Systems: Family member(s), Friends \ neighbors(grand-daughter helps during [place], daughter on weekends) Patient Expects to be Discharged to[de-identified] Private residence Current DME Used/Available at Home: [employer], rollator, Walker, rolling EXAMINATION/PRESENTATION/DECISION MAKING:  
Critical Behavior: 
Neurologic State: Alert, Appropriate for age, Eyes open spontaneously Orientation Level: Oriented X4 Cognition: Appropriate for age attention/concentration Range Of Motion: 
AROM: Generally decreased, functional 
  
  
  
PROM: Generally decreased, functional 
  
  
  
Strength:   
Strength: Grossly decreased, non-functional 
  
  
  
  
  
  
Tone & Sensation:  
  
  
  
  
  
  
  
  
  
   
Coordination: 
Coordination: Grossly decreased, non-functional 
Vision:  
  
Functional Mobility: 
Bed Mobility: 
Rolling: Moderate assistance;Assist x2 Supine to Sit: Moderate assistance;Assist x1;Additional time Sit to Supine: Maximum assistance;Assist x2; Additional time Transfers: Sit to Stand: Maximum assistance;Assist x1;Additional time Stand to Sit: Maximum assistance; Additional time;Assist x1 Balance:  
  
Ambulation/Gait Training: 
  
  
  
  
  
  
  
 
Functional Measure: 
Barthel Index: 
 
Bathin Bladder: 5 Bowels: 5 Groomin Dressin Feedin Mobility: 0 Stairs: 0 Toilet Use: 0 Transfer (Bed to Chair and Back): 0 Total: 15/100 The Barthel ADL Index: Guidelines 1. The index should be used as a record of what a patient does, not as a record of what a patient could do. 2. The main aim is to establish degree of independence from any help, physical or verbal, however minor and for whatever reason. 3. The need for supervision renders the patient not independent. 4. A patient's performance should be established using the best available evidence. Asking the patient, friends/relatives and nurses are the usual sources, but direct observation and common sense are also important. However direct testing is not needed. 5. Usually the patient's performance over the preceding 24-48 hours is important, but occasionally longer periods will be relevant. 6. Middle categories imply that the patient supplies over 50 per cent of the effort. 7. Use of aids to be independent is allowed. Ky Corrigan., Barthel, D.W. (1511). Functional evaluation: the Barthel Index. [facility] (14)2. Antonia Gonzalez belen DAMI Riley, Pat Vergara., Severa Mouse., [facility], [address] [place] [address] (). Measuring the change indisability after inpatient rehabilitation; comparison of the responsiveness of the Barthel Index and Functional [place] Measure. Journal of Neurology, Neurosurgery, and Psychiatry, 66(4), 819-082. Tali Flood, N.J.A, SANIYA Parry, & Yamilet Duarte, MJosiahA. (2004.) Assessment of post-stroke quality of life in cost-effectiveness studies: The usefulness of the Barthel Index and the EuroQoL-5D. [facility], 13, 101-67 Pain Rating: None noted Activity Tolerance:  
Fair After treatment patient left in no apparent distress:  
Supine in bed, Call bell within reach, and Bed / chair alarm activated COMMUNICATION/EDUCATION:  
The patients plan of care was discussed with: Registered nurse. Fall prevention education was provided and the patient/caregiver indicated understanding., Patient/family have participated as able in goal setting and plan of care. , and Patient/family agree to work toward stated goals and plan of care. Thank you for this referral. 
Jayashree Omer, PT, DPT Time Calculation: 25 mins

## 2021-01-14 NOTE — PROGRESS NOTES
Cardiology Progress Note       ICU  
NAME:  Audrey Ruiz :   1945 MRN:   [id number] Assessment/Plan: 1. Paroxysmal a fib: back in SR as of 12 yesterday. Would cont po dilt and short term amio. Cont low dose dig. Ck level in 1 week. (1.5 ). Cont OAC. 2. HF p EF: euvolemic. 3. DM:  
4. COPD: baseline oxygen requirements 3 liters at home. 5. HTN: stable 6. Obesity: Body mass index is 39.3 kg/m². 7. Anemia:  
 
 
Ok for SNF Has OP follow up Will see prn. Pt personally seen and examined. Chart reviewed. Agree with advanced NP's history, exam and  A/P with changes/additons. Visit Vitals /67 (BP 1 Location: Left arm, BP Patient Position: At rest;Supine) Pulse 88 Temp 98.6 °F (37 °C) Resp 18 Ht 5' 7\" (1.702 m) Wt 250 lb 14.4 oz (113.8 kg) SpO2 94% BMI 39.30 kg/m² PAF - in SR. Reid Ann MD, [facility] Subjective:  
Ms. Harley Mitchell is a 71 yo with history of Afib, CAD, HFpEF, DM, Recurrent DVT, COPD on 3L NC at home, PAD s/p left fem pop,  Asthma, HTN and Obesity who is admitted with Acute resp failure, LLL PNA, Anemia and GI Bleed (occult stool + blood),  
 
 
  
Cardiac ROS: Patient denies any exertional chest pain, dyspnea, palpitations, syncope, orthopnea, edema or paroxysmal nocturnal dyspnea. Previous Cardiac Eval 
Lipids 8/10/18: , HDL 95, HDL 3.1, , VLDL 91 Cardiac Testing/ Procedures: 
  
A. Cardiac Cath/PCI: 18 At [address] - LAD stent/PTCA of D1 
  
10/9/15 L Main: Medl;Nml 
  
LAD: Prox- Med; 50%; Distal - small; D1 - Small to med - prox 60% 
  
LCflex: Large; Tortuous; MLI; GUILLERMO - med - MLI 
  
RCA: Med; Prox 80%; Distal 95% just before bifurcation into small PDA and PLB 
  
LVEDP: 22 
  
LVEF: 55%/ Mild basal inf hypokinesis 
  
No significant gradient across aortic valve. 
  
PCI: JR4 guide/BMW Pre dil with 2 by 12 balloon BMS 2.25 by 22 deployed at high [place] distally BMS 2.5 by 18 deployed at high [place] proximal lesion Post dil with 2.5 by 15 
  
  
B. ECHO/MEE: 2018 - Left ventricle: Systolic function was normal. Ejection fraction was 
estimated to be 60 %. There were no regional wall motion abnormalities. Doppler parameters were consistent with abnormal left ventricular 
relaxation (grade 1 diastolic dysfunction). Aortic valve: Leaflets exhibited normal cuspal separation and good 
mobility. Not well visualized. 
  
10/11/15 - Left ventricle: Systolic function was vigorous. Ejection fraction was 
estimated in the range of 65 % to 70 %. There were no regional wall motion 
abnormalities. 
  
C. StressNuclear/Stress ECHO/Stress test: 
  
D. Vascular: 
  
E. EP: 
  
F. Miscellaneous: 
  
Cardaic cath at [address] [place] [address] 18: radial - SAMUEL to ALD and ballon angioplasty to 2nd dx. Dr Margareth Max. Echo 6/3/18 LVEF 65-70% no RWMA. Mild LVH. Mild calcified annulus Review of Systems: No nausea, indigestion, vomiting, pain, cough, sputum. No bleeding. Taking po. Appetite ok. Objective:  
 
Visit Vitals BP (!) 142/49 Pulse 83 Temp 98.2 °F (36.8 °C) Resp 17 Ht 5' 7\" (1.702 m) Wt 113.8 kg (250 lb 14.4 oz) SpO2 95% BMI 39.30 kg/m² O2 Flow Rate (L/min): 3 l/min O2 Device: Nasal cannula Temp (24hrs), Av.9 °F (36.6 °C), Min:97.7 °F (36.5 °C), Max:98.2 °F (36.8 °C) No intake/output data recorded.  1901 -  0700 In: 1200 [P.O.:1200] Out: 650 [Urine:650] TELE: SR PAC's General: AAOx3 cooperative, no acute distress. HEENT: Atraumatic. Pink and moist.  Anicteric sclerae. Neck : Supple, no thyromegaly. Lungs: Dim bases Heart: regular rhythm, no murmur, no rubs, no gallops. No JVD. Abdomen: Soft, non-distended, non-tender. + Bowel sounds. Extremities: No edema Neurologic: Grossly intact. Alert and oriented X 3. No acute neurological distress. Psych: Good insight. Not anxious or agitated. Care Plan discussed with: 
  Comments Patient x Family RN x Care Manager x Consultant:  x Data Review: No lab exists for component: ITNL No results for input(s): CPK, CKMB, TROIQ in the last 72 hours. Recent Labs  
  01/12/21 
2315 01/11/21 
4614  143 K 4.3 3.8  106 CO2 30 34* BUN 24* 23* CREA 1.04* 1.07* * 94  
MG 2.1 2.2 ALB  --  2.3* WBC 13.0* 13.1* HGB 7.8* 8.3* HCT 26.8* 29.1*  
 273 No results for input(s): INR, PTP, APTT, INREXT, INREXT in the last 72 hours. Medications reviewed Current Facility-Administered Medications Medication Dose Route Frequency  dilTIAZem (CARDIZEM) 100 mg in 0.9% sodium chloride (MBP/ADV) 100 mL infusion  0-15 mg/hr IntraVENous TITRATE  amiodarone (CORDARONE) 375 mg in dextrose 5% 250 mL infusion  0.5-1 mg/min IntraVENous TITRATE  budesonide (PULMICORT) 500 mcg/2 ml nebulizer suspension  500 mcg Nebulization BID RT  
 amiodarone (CORDARONE) tablet 400 mg  400 mg Oral BID  [START ON 1/21/2021] amiodarone (CORDARONE) tablet 200 mg  200 mg Oral DAILY  digoxin (LANOXIN) tablet 0.125 mg  0.125 mg Oral DAILY  insulin glargine (LANTUS) injection 18 Units  18 Units SubCUTAneous QHS  albuterol-ipratropium (DUO-NEB) 2.5 MG-0.5 MG/3 ML  3 mL Nebulization Q4H RT  
 dilTIAZem ER (CARDIZEM CD) capsule 240 mg  240 mg Oral DAILY  ferrous sulfate tablet 325 mg  1 Tab Oral DAILY WITH BREAKFAST  loperamide (IMODIUM) capsule 2 mg  2 mg Oral BID  nystatin (MYCOSTATIN) 100,000 unit/mL oral suspension 500,000 Units  500,000 Units Oral QID  lipase-protease-amylase (CREON 24,000) capsule 1 Cap  1 Cap Oral TID WITH MEALS  
 apixaban (ELIQUIS) tablet 5 mg  5 mg Oral BID  predniSONE (DELTASONE) tablet 20 mg  20 mg Oral DAILY WITH BREAKFAST  phosphorus (K PHOS NEUTRAL) 250 mg tablet 1 Tab  1 Tab Oral BID  pantoprazole (PROTONIX) tablet 40 mg  40 mg Oral ACB  0.9% sodium chloride infusion 250 mL  250 mL IntraVENous PRN  
 0.9% sodium chloride infusion 250 mL  250 mL IntraVENous PRN  
 sodium chloride (NS) flush 5-10 mL  5-10 mL IntraVENous PRN  
 sodium chloride (NS) flush 5-40 mL  5-40 mL IntraVENous Q8H  
 sodium chloride (NS) flush 5-40 mL  5-40 mL IntraVENous PRN  
 acetaminophen (TYLENOL) tablet 650 mg  650 mg Oral Q6H PRN Or  
 acetaminophen (TYLENOL) suppository 650 mg  650 mg Rectal Q6H PRN  polyethylene glycol (MIRALAX) packet 17 g  17 g Oral DAILY PRN  promethazine (PHENERGAN) tablet 12.5 mg  12.5 mg Oral Q6H PRN Or  
 ondansetron (ZOFRAN) injection 4 mg  4 mg IntraVENous Q6H PRN  
 0.9% sodium chloride infusion 250 mL  250 mL IntraVENous PRN  
 glucose chewable tablet 16 g  4 Tab Oral PRN  
 dextrose (D50W) injection syrg 12.5-25 g  25-50 mL IntraVENous PRN  
 glucagon (GLUCAGEN) injection 1 mg  1 mg IntraMUSCular PRN  
 insulin lispro (HUMALOG) injection   SubCUTAneous QID WITH MEALS  cholecalciferol (VITAMIN D3) (1000 Units /25 mcg) tablet 2 Tab  2,000 Units Oral DAILY  cyanocobalamin (VITAMIN B12) tablet 1,000 mcg  1,000 mcg Oral DAILY  DULoxetine (CYMBALTA) capsule 60 mg  60 mg Oral DAILY  metoprolol tartrate (LOPRESSOR) tablet 25 mg  25 mg Oral DAILY PRN  
 mirtazapine (REMERON) tablet 15 mg  15 mg Oral QHS  montelukast (SINGULAIR) tablet 10 mg  10 mg Oral QPM  
 
 
 
Ethan Fuentes NP

## 2021-01-14 NOTE — PROGRESS NOTES
Problem: Mobility Impaired (Adult and Pediatric) Goal: *Acute Goals and Plan of Care (Insert Text) Description: FUNCTIONAL STATUS PRIOR TO ADMISSION: Patient was modified independent using a rolling walker for functional mobility. HOME SUPPORT PRIOR TO ADMISSION: The patient lived with daughter and grand-daughter but did not require assist. 
 
Physical Therapy Goals Revised 1/13/2021 Continue all goals for an additional 7 days. Initiated 1/5/2021 1. Patient will move from supine to sit and sit to supine  in bed with moderate assistance  within 7 day(s). 2.  Patient will transfer from bed to chair and chair to bed with moderate assistance  using the least restrictive device within 7 day(s). 3.  Patient will perform sit to stand with moderate assistance  within 7 day(s). 4.  Patient will ambulate with moderate assistance  for 50 feet with the least restrictive device within 7 day(s). Outcome: Progressing Towards Goal 
Note: PHYSICAL THERAPY TREATMENT Patient: Alexandria Lee (46 y.o. female) Date: 1/14/2021 Diagnosis: Left lower lobe pneumonia [J18.9] <principal problem not specified> Procedure(s) (LRB): ESOPHAGOGASTRODUODENOSCOPY (EGD) (N/A) COLONOSCOPY (N/A) ESOPHAGOGASTRODUODENAL (EGD) BIOPSY (N/A) COLON BIOPSY (N/A) ENDOSCOPIC POLYPECTOMY (N/A) 10 Days Post-Op Precautions: Fall, WBAT Chart, physical therapy assessment, plan of care and goals were reviewed. ASSESSMENT Patient continues with skilled PT services and is progressing towards goals. Patient able to transfer to sit up in chair today, using RW and MIN A x2. Requires constant verbal cuing throughout for pursed lip breathing (due to constant mouth breathing) and requires constant cluing for carry over. Her sats were stable throughout at 96% on 3L. Patient still below her baseline and would benefit from rehab. Current Level of Function Impacting Discharge (mobility/balance): MOD A x2 for bed mobility, MIN A x2 for sit-stand and SPT with RW Other factors to consider for discharge: PLAN : 
Patient continues to benefit from skilled intervention to address the above impairments. Continue treatment per established plan of care. to address goals. Recommendation for discharge: (in order for the patient to meet his/her long term goals) Therapy up to 5 days/week in SNF setting This discharge recommendation: A follow-up discussion with the attending provider and/or case management is planned IF patient discharges home will need the following DME: to be determined (TBD) SUBJECTIVE:  
Patient stated . OBJECTIVE DATA SUMMARY:  
Critical Behavior: 
Neurologic State: Alert Orientation Level: Oriented X4 Cognition: Follows commands, Appropriate for age attention/concentration Safety/Judgement: Awareness of environment Functional Mobility Training: 
Bed Mobility: 
Rolling: Moderate assistance;Assist x2; Additional time; Adaptive equipment Supine to Sit: Moderate assistance;Assist x2; Additional time; Adaptive equipment;Bed Modified Transfers: 
Sit to Stand: Assist x2;Minimum assistance; Additional time Stand to Sit: Minimum assistance;Assist x2;Adaptive equipment Bed to Chair: Minimum assistance; Adaptive equipment; Additional time;Assist x1 Balance: 
Sitting: Impaired Sitting - Static: Good (unsupported) Sitting - Dynamic: Not tested Standing: Impaired; Without support Standing - Static: Constant support; Fair 
Standing - Dynamic : Not tested Ambulation/Gait Training: 
  
  
  
  
  
  
Pain Rating: 
None reported Activity Tolerance:  
Fair After treatment patient left in no apparent distress:  
Sitting in chair, Call bell within reach, and Bed / chair alarm activated COMMUNICATION/COLLABORATION:  
 The patients plan of care was discussed with: Registered nurse. Stephenie Pantoja PT, DPT Time Calculation: 32 mins

## 2021-01-14 NOTE — PROGRESS NOTES
Problem: Falls - Risk of 
Goal: *Absence of Falls Description: Document John Schafer Fall Risk and appropriate interventions in the flowsheet. Outcome: Progressing Towards Goal 
Note: Fall Risk Interventions: 
Mobility Interventions: Communicate number of staff needed for ambulation/transfer, Bed/chair exit alarm, OT consult for ADLs, Patient to call before getting OOB, PT Consult for mobility concerns, PT Consult for assist device competence Medication Interventions: Bed/chair exit alarm, Teach patient to arise slowly, Utilize gait belt for transfers/ambulation, Patient to call before getting OOB, Evaluate medications/consider consulting pharmacy Elimination Interventions: Bed/chair exit alarm, Call light in reach, Elevated toilet seat, Toileting schedule/hourly rounds, Toilet paper/wipes in reach, Stay With Me (per policy), Patient to call for help with toileting needs History of Falls Interventions: Bed/chair exit alarm, Consult care management for discharge planning, Door open when patient unattended, Evaluate medications/consider consulting pharmacy, Room close to nurse's station, Utilize gait belt for transfer/ambulation, Assess for delayed presentation/identification of injury for 48 hrs (comment for end date), Vital signs minimum Q4HRs X 24 hrs (comment for end date), Investigate reason for fall Problem: Patient Education: Go to Patient Education Activity Goal: Patient/Family Education Outcome: Progressing Towards Goal 
  
Problem: Pressure Injury - Risk of 
Goal: *Prevention of pressure injury Description: Document Darrion Scale and appropriate interventions in the flowsheet. Outcome: Progressing Towards Goal 
Note: Pressure Injury Interventions: Sensory Interventions: Assess changes in LOC, Assess need for specialty bed, Check visual cues for pain, Discuss PT/OT consult with provider, Float heels, Keep linens dry and wrinkle-free, Maintain/enhance activity level, Minimize linen layers, Monitor skin under medical devices, Pad between skin to skin, Pressure redistribution bed/mattress (bed type), Turn and reposition approx. every two hours (pillows and wedges if needed) Moisture Interventions: Absorbent underpads, Apply protective barrier, creams and emollients, Check for incontinence Q2 hours and as needed, Internal/External urinary devices, Limit adult briefs, Maintain skin hydration (lotion/cream), Minimize layers, Moisture barrier, Offer toileting Q_hr Activity Interventions: Increase time out of bed, Pressure redistribution bed/mattress(bed type), PT/OT evaluation, Assess need for specialty bed Mobility Interventions: Assess need for specialty bed, Pressure redistribution bed/mattress (bed type), HOB 30 degrees or less, Float heels, PT/OT evaluation, Turn and reposition approx. every two hours(pillow and wedges) Nutrition Interventions: Document food/fluid/supplement intake, Offer support with meals,snacks and hydration, Discuss nutritional consult with provider Friction and Shear Interventions: Apply protective barrier, creams and emollients, Foam dressings/transparent film/skin sealants, HOB 30 degrees or less, Lift sheet, Lift team/patient mobility team, Minimize layers Problem: Patient Education: Go to Patient Education Activity Goal: Patient/Family Education Outcome: Progressing Towards Goal

## 2021-01-14 NOTE — PROGRESS NOTES
1159: Unable to stand pt up x2 assist to get back in bed from recliner. Pt placed back in bed w/ loly lift.

## 2021-01-14 NOTE — PROGRESS NOTES
Daily Progress Note: 1/14/2021 Genesis Tao Maurice Richey MD 
 
Assessment/Plan:  
Left lower lobe pneumonia vrs localized pulmonary edema 
-Rocephin and azithromycin initially then off antibiotics per Pulmonary 
-Covid negative 
-pulmonology following 
  
Acute hypoxic respiratory failure 
-Continue with supplemental oxygen 
-Patient uses 3 L of oxygen at baseline Afib with RVR, fluid overloaded 
- recurrent episodes 
-monitor mag/phos/K+ and replete PRN 
-Cards managing 
- resumed Eliquis 
- lasix/diuretics per Cards 
  
Anemia - acute blood loss on chronic 
-Occult blood positive 
-Transfused 2 units PRBC so far -GI on board- endoscopies 1/4 as under Data Review - Colonic polys path revealed tubular adenomas - follow up pre GI 
  
Sepsis 
-Continue treatment of above 
- culture negative 
  
Elevated creatinine 
-monitor, treat as needed 
  
Hypertension 
-Continue with metoprolol 
  
Type 2 diabetes:  A1c 5.9 
-Patient takes Onglyza which we will hold 
-Continue sliding scale insulin  
-lantus halved to 10 
   
Anxiety/depression 
-Continue with Cymbalta and Remeron 
   
 
Problem List: 
Problem List as of 1/14/2021 Date Reviewed: 11/2/2020 Codes Class Noted - Resolved Left lower lobe pneumonia ICD-10-CM: J18.9 ICD-9-CM: 615  1/1/2021 - Present Leukocytosis ICD-10-CM: O23.171 ICD-9-CM: 288.60  11/3/2020 - Present Chronic respiratory failure with hypoxia [facility]) ICD-10-CM: J96.11 
ICD-9-CM: 518.83, 799.02  11/2/2020 - Present Overview Signed 11/2/2020 10:12 PM by Estrellita Bailey MD  
  On 3L NC at home Cellulitis of lower extremity ICD-10-CM: L03.119 ICD-9-CM: 682.6  3/23/2020 - Present ASO (arteriosclerosis obliterans) ICD-10-CM: I70.90 ICD-9-CM: 440.9  9/5/2019 - Present PVD (peripheral vascular disease) ([facility] ICD-10-CM: I73.9 ICD-9-CM: 443.9  8/1/2019 - Present  Severe obesity ([facility] ICD-10-CM: E66.01 
 ICD-9-CM: 278.01  7/30/2019 - Present COPD (chronic obstructive pulmonary disease) (HCC) ICD-10-CM: J44.9 ICD-9-CM: 732  7/13/2019 - Present Normocytic anemia ICD-10-CM: D64.9 ICD-9-CM: 285.9  7/13/2019 - Present PAD (peripheral artery disease) (HCC) ICD-10-CM: I73.9 ICD-9-CM: 443.9  7/13/2019 - Present Mild intermittent asthma ICD-10-CM: J45.20 ICD-9-CM: 493.90  5/17/2019 - Present Brachial artery thrombus (HCC) ICD-10-CM: T48.2 ICD-9-CM: 444.21  4/26/2019 - Present Sleep apnea ICD-10-CM: G47.30 ICD-9-CM: 780.57  1/5/2019 - Present Overview Signed 1/5/2019  8:41 PM by Shabana Basurto DO  
  wears CPAP Atrial fibrillation [facility]) ICD-10-CM: I48.91 
ICD-9-CM: 427.31  11/16/2018 - Present Obesity (BMI 30-39.9) (Chronic) ICD-10-CM: Q85.6 ICD-9-CM: 278.00  11/13/2018 - Present Recurrent deep vein thrombosis (DVT) (HCC) ICD-10-CM: I82.409 ICD-9-CM: 453.40  3/30/2018 - Present Chronic anticoagulation (Chronic) ICD-10-CM: Z79.01 
ICD-9-CM: V58.61  3/30/2018 - Present Essential hypertension (Chronic) ICD-10-CM: I10 
ICD-9-CM: 401.9  1/22/2016 - Present CAD (coronary artery disease) ICD-10-CM: I25.10 ICD-9-CM: 414.00  10/9/2015 - Present Type II diabetes mellitus (HCC) (Chronic) ICD-10-CM: E11.9 ICD-9-CM: 250.00  10/9/2015 - Present RESOLVED: Syncope and collapse ICD-10-CM: R55 
ICD-9-CM: 780.2  9/29/2020 - 11/2/2020 RESOLVED: Cellulitis ICD-10-CM: L03.90 ICD-9-CM: 682.9  3/23/2020 - 5/29/2020 RESOLVED: Hypokalemia ICD-10-CM: E87.6 ICD-9-CM: 276.8  3/23/2020 - 5/29/2020 RESOLVED: Hyponatremia ICD-10-CM: E87.1 ICD-9-CM: 276.1  3/23/2020 - 5/29/2020 RESOLVED: Diarrhea ICD-10-CM: R19.7 ICD-9-CM: 787.91  3/23/2020 - 5/29/2020 RESOLVED: Syncope and collapse ICD-10-CM: R55 
ICD-9-CM: 780.2  7/13/2019 - 5/29/2020 RESOLVED: UTI (urinary tract infection) ICD-10-CM: N39.0 ICD-9-CM: 599.0  7/13/2019 - 11/2/2020 RESOLVED: Sepsis ([facility] ICD-10-CM: A41.9 ICD-9-CM: 038.9, 995.91  7/13/2019 - 11/3/2020 RESOLVED: Leg wound, left ICD-10-CM: Y70.572O ICD-9-CM: 891.0  7/13/2019 - 5/29/2020 RESOLVED: Leg laceration, right, initial encounter ICD-10-CM: K96.007T ICD-9-CM: 894.0  7/13/2019 - 5/29/2020 RESOLVED: Cellulitis of right upper arm ICD-10-CM: L03.113 ICD-9-CM: 682.3  5/17/2019 - 5/29/2020 RESOLVED: Gangrene of finger of right hand ([facility] ICD-10-CM: Q30 
ICD-9-CM: 785.4  5/17/2019 - 11/2/2020 RESOLVED: Bowel obstruction ([facility] ICD-10-CM: I68.785 ICD-9-CM: 560.9  1/8/2019 - 5/29/2020 RESOLVED: Partial small bowel obstruction ([facility] ICD-10-CM: K56.600 ICD-9-CM: 560.9  1/5/2019 - 5/29/2020 RESOLVED: Dyspnea ICD-10-CM: R06.00 
ICD-9-CM: 786.09  11/13/2018 - 5/29/2020 RESOLVED: ARF (acute renal failure) (HCC) ICD-10-CM: N17.9 ICD-9-CM: 584.9  11/13/2018 - 5/29/2020 RESOLVED: Closed wedge compression fracture of T7 vertebra ([facility] ICD-10-CM: B66.871J ICD-9-CM: 805.2  11/13/2018 - 5/29/2020 RESOLVED: Type 2 diabetes with nephropathy ([facility] ICD-10-CM: E11.21 
ICD-9-CM: 250.40, 583.81  10/1/2018 - 11/2/2020 RESOLVED: Chest pain ICD-10-CM: R07.9 ICD-9-CM: 786.50  6/27/2018 - 5/29/2020 RESOLVED: Abdominal pain ICD-10-CM: R10.9 ICD-9-CM: 789.00  6/27/2018 - 5/29/2020 RESOLVED: Coronary artery disease involving native coronary artery without angina pectoris (Chronic) ICD-10-CM: I25.10 ICD-9-CM: 414.01  1/22/2016 - 11/2/2020 RESOLVED: HTN (hypertension) ICD-10-CM: I10 
ICD-9-CM: 401.9  10/9/2015 - 1/22/2016 RESOLVED: NSTEMI (non-ST elevated myocardial infarction) ([facility] ICD-10-CM: I21.4 ICD-9-CM: 410.70  10/9/2015 - 5/29/2020 Subjective: Ricky Leavitt is a 70 y.o. female who presented with shortness of breath which has been progressively worsening since this weekend. She reports having by cough and sinus congestion. Symptoms acutely worsened this morning. She denies any chest pain, abdominal pain, diarrhea, or fever. She has felt chills. Upon arrival of EMS she was found to have saturations in the 70s. She has a history of chronic COPD and uses 3 L of oxygen at baseline. [Dr Daniella Mora 1/2: Got 2 units PRBCs, but follow up labs unable to be obtained for the past 12 hours. I spoke with anesthesia and they will access her for central line. Pt tolerated PO, +BM yesterday. Says her legs are no more swollen than her baseline. 1/3: Sugar was in 50s before bed last night. Held bedtime humalog and halved lantus. Sugars better this AM.  Now in Afib with RVR. Responded only briefly to dilt push. Pt feels the tachycardia. Denies increased SOB or CP. Tolerated PO yesterday. Due for bowel prep today, but on hold until cardiovascularly more stable. 1/4:  She reports she feels better this AM.  Currently in sinus rhythm at this moment. She is going to endoscopy this AM.  Hb up to 8.6. WBC 14.4. Will decrease Prednisone to 20mg per day. 1/5:  Continues to feel better but still very fatigued. Remains in sinus rhythm at this moment. Upper and lower endoscopies as under Data Review. Eliquis resumed. 1/6:  About the same with no specific complaints except \"feel weak all over. \"  Back in A Fib but rate ok in 90s to low 100s. Hb stable at 8.5. She is willing to go to rehab so will get Case Management to see. 1/7:  No specific complaints. Hb 8.3. Cards reported irreg rate more of PACs than A fib at this time. Repeat CXR today. Stable for rehab when bed found. Tubular adenomas on colon polyp path - follow up per GI. 1/8:  No specific complaints. Still very weak, cannot get up on her own and I do not think she can manage at assisted living without 24/7 care at this point. CXR yesterday without progression. She declined rehab yesterday but after extended discussion she would agree to go to [facility]. PT still recommending rehab. She is basically at baseline except her weakness. 1/9:  She reports she is feeling better. She wants to try to get up to chair today. Hb down from 9.0 to 7.2 this AM - recheck later today and transfuse if needed. 1/10:  Other than fatigue and weakness she reports no complaints. She sat up on edge of bed yesterday. Pulled IJ out last night and nurses report unable to get labs. Pt requests no labs unless absolutely necessary. Recheck of Hb yesterday PM was 8.9 so will hold off on labs for now. Stable for rehab.   
 
1/11:  Reports she feels back to usual this AM.  Another episode of A fib with RVR last night - Cards restarted Dig, gave IV bolus of Cardizem and she allowed labs yesterday and converted back to sinus. Hb down a bit at 7.7 yesterday. AM labs not drawn yet. 1/12:  Little change from yesterday she reports. Still c/o fatigue and weakness. Rate down to 80s. Remains afebrile. Repeat Covid negative. Hb yesterday AM 8.3. If this AM Hb is ok she could go to [facility] later today. Pt reports she feels ready to go to rehab.    
 
1/13:  She feels the rapid heart beat but no chest pain or shortness of breath. Recurrent Afib with RVR yesterday that was unable to be resolved on floor and transferred to step down unit. Mag and K+ ok. Rate in 130s to 150s this AM at the time of my visit. Cards is seeing her now. :  Back in sinus this AM.  Cards started Amiodarone yesterday and converted. Now on Dilt, Amio and dig. No complaints this AM except weakness. Watch here another day and if still in sinus tomorrow then hopefully to rehab. Cards recommends Amiodarone x 1 month and follow up in Feb.  
 
Review of Systems: A comprehensive review of systems was negative except for that written in the HPI. Objective:  
Physical Exam:  
Visit Vitals BP (!) 135/59 Pulse 82 Temp 98.2 °F (36.8 °C) Resp 26 Ht 5' 7\" (1.702 m) Wt 113.8 kg (250 lb 14.4 oz) SpO2 100% BMI 39.30 kg/m² O2 Flow Rate (L/min): 3 l/min O2 Device: Nasal cannula Temp (24hrs), Av.9 °F (36.6 °C), Min:97.7 °F (36.5 °C), Max:98.2 °F (36.8 °C) No intake/output data recorded. 1 -  0700 In: 1200 [P.O.:1200] Out: 650 [Urine:650] General:  Alert, cooperative, no distress, appears stated age, moon-face. Head:  Normocephalic, without obvious abnormality, atraumatic. Eyes:  Conjunctivae/corneas clear. EOMs intact. Throat: Lips, mucosa, and tongue moist.  
Neck: Supple, symmetrical, trachea midline, no adenopathy, thyroid: no enlargement/tenderness/nodules, no carotid bruit and no JVD. Lungs:    diminished at bases, a few scattered rhonchi Chest wall:  No tenderness or deformity. Heart:   reg with rate in 80s at this time - monitor shows sinus. no murmur, click, rub or gallop. Abdomen:   Soft, non-tender. Bowel sounds normal. No masses,  No organomegaly. 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 Arms/hands much less swollen. No calf tenderness or cords. Pulses: 2+ and symmetric all extremities. Skin: Skin color, texture, turgor normal. No rashes Neurologic:   Alert and oriented X 3.  equal.  Gait not tested at this time. Sensation grossly normal to touch.   Gross motor of extremities normal.    
 Data Review:  
  Esophagogastroduodenoscopy (EGD) Colonoscopy Procedure Note Audrey Ruiz : 1945 [address] [place] [address] Record Number: [id number] 
  
  
Indications:    Iron deficiency anemia, diarrhea Referring Physician:  Abril Reilly MD 
Anesthesia/Sedation: see nursing notes Endoscopist:  Dr. Masood Millan Assistants: None Procedure in Detail: After obtaining informed consent, positioning of the patient in the left lateral decubitus position, and conduction of a pre-procedure pause or \"time out\" the endoscope was introduced into the mouth and advanced to the duodenum. A careful inspection was made, and findings or interventions are described below. Findings:  
Esophagus:numerous white exudates Stomach: antral erythema with soft submucosal mass Normal body and fundus Duodenum/jejunum: erythema, erosions second into third portion Complications/estimated blood loss: none Therapies:  biopsy of stomach antrum at site submucosal mass 
biopsy of duodenal second portion, third portion Specimens: biopsies Implants:none Endoscopist:  Dr. Masood Millan Assistants: None Complications:  None Estimate Blood Loss:  None 
  
Colonoscopy Procedure in Detail: After obtaining informed consent, positioning of the patient in the left lateral decubitus position, and conduction of a pre-procedure pause or \"time out\" the endoscope was introduced into the anus and advanced to the terminal ileum. The quality of the colonic preparation was adequate. A careful inspection was made as the colonoscope was withdrawn, findings and interventions are described below. Appendiceal orifice photographed Findings: - Diverticulosis Two sessile 12 mm polyps Specimens:  
 polyps removed cold snare; random biopsies cecum, ascending Implants: none Complications:  
None; patient tolerated the procedure well. Estimated blood loss: none Impression: Candida esophagitis, gastritis, duodenitis, colon polyps, diverticulosis. Antral mass likely a benign lipoma Recommendations: - Await pathology. - anticoagulate tomorrow Resume diet; add creon, nystatin suspension Thank you for entrusting me with this patient's care. Please do not hesitate to contact me with any questions or if I can be of assistance with any of your other patients' GI needs. Signed By: Mel Haas MD 
                      January 4, 2021 Port CXR 1/7/21 IMPRESSION: Unchanged left basilar airspace disease and small left pleural 
Effusion Recent Days: 
Recent Labs  
  01/12/21 2315 01/11/21 
7287 WBC 13.0* 13.1* HGB 7.8* 8.3* HCT 26.8* 29.1*  
 273 Recent Labs  
  01/12/21 2315 01/11/21 
8735  143 K 4.3 3.8  106 CO2 30 34* * 94 BUN 24* 23* CREA 1.04* 1.07* CA 8.1* 8.2* MG 2.1 2.2 ALB  --  2.3* ALT  --  49 No results for input(s): PH, PCO2, PO2, HCO3, FIO2 in the last 72 hours. 24 Hour Results: 
Recent Results (from the past 24 hour(s)) GLUCOSE, POC Collection Time: 01/13/21  8:23 AM  
Result Value Ref Range Glucose (POC) 83 65 - 100 mg/dL Performed by Deane Bosworth AMBER   
TSH 3RD GENERATION Collection Time: 01/13/21 11:29 AM  
Result Value Ref Range TSH 3.40 0.36 - 3.74 uIU/mL GLUCOSE, POC Collection Time: 01/13/21 11:29 AM  
Result Value Ref Range Glucose (POC) 102 (H) 65 - 100 mg/dL Performed by Torrey Burnham, POC Collection Time: 01/13/21  5:37 PM  
Result Value Ref Range Glucose (POC) 263 (H) 65 - 100 mg/dL Performed by Torrey Burnham, POC Collection Time: 01/13/21  8:59 PM  
Result Value Ref Range Glucose (POC) 235 (H) 65 - 100 mg/dL Performed by Greggory Olszewski Collection Time: 01/14/21  2:18 AM  
Result Value Ref Range Digoxin level 1.5 0.90 - 2.00 NG/ML  
SAMPLES BEING HELD  Collection Time: 01/14/21  2:18 AM  
 Result Value Ref Range SAMPLES BEING HELD 1SST,1LAV,1PST COMMENT Add-on orders for these samples will be processed based on acceptable specimen integrity and analyte stability, which may vary by analyte. Medications reviewed Current Facility-Administered Medications Medication Dose Route Frequency  dilTIAZem (CARDIZEM) 100 mg in 0.9% sodium chloride (MBP/ADV) 100 mL infusion  0-15 mg/hr IntraVENous TITRATE  amiodarone (CORDARONE) 375 mg in dextrose 5% 250 mL infusion  0.5-1 mg/min IntraVENous TITRATE  budesonide (PULMICORT) 500 mcg/2 ml nebulizer suspension  500 mcg Nebulization BID RT  
 amiodarone (CORDARONE) tablet 400 mg  400 mg Oral BID  [START ON 1/21/2021] amiodarone (CORDARONE) tablet 200 mg  200 mg Oral DAILY  digoxin (LANOXIN) tablet 0.125 mg  0.125 mg Oral DAILY  insulin glargine (LANTUS) injection 18 Units  18 Units SubCUTAneous QHS  albuterol-ipratropium (DUO-NEB) 2.5 MG-0.5 MG/3 ML  3 mL Nebulization Q4H RT  
 dilTIAZem ER (CARDIZEM CD) capsule 240 mg  240 mg Oral DAILY  ferrous sulfate tablet 325 mg  1 Tab Oral DAILY WITH BREAKFAST  loperamide (IMODIUM) capsule 2 mg  2 mg Oral BID  nystatin (MYCOSTATIN) 100,000 unit/mL oral suspension 500,000 Units  500,000 Units Oral QID  lipase-protease-amylase (CREON 24,000) capsule 1 Cap  1 Cap Oral TID WITH MEALS  
 apixaban (ELIQUIS) tablet 5 mg  5 mg Oral BID  predniSONE (DELTASONE) tablet 20 mg  20 mg Oral DAILY WITH BREAKFAST  phosphorus (K PHOS NEUTRAL) 250 mg tablet 1 Tab  1 Tab Oral BID  pantoprazole (PROTONIX) tablet 40 mg  40 mg Oral ACB  
 0.9% sodium chloride infusion 250 mL  250 mL IntraVENous PRN  
 0.9% sodium chloride infusion 250 mL  250 mL IntraVENous PRN  
 sodium chloride (NS) flush 5-10 mL  5-10 mL IntraVENous PRN  
 sodium chloride (NS) flush 5-40 mL  5-40 mL IntraVENous Q8H  
 sodium chloride (NS) flush 5-40 mL  5-40 mL IntraVENous PRN  
  acetaminophen (TYLENOL) tablet 650 mg  650 mg Oral Q6H PRN Or  
 acetaminophen (TYLENOL) suppository 650 mg  650 mg Rectal Q6H PRN  polyethylene glycol (MIRALAX) packet 17 g  17 g Oral DAILY PRN  promethazine (PHENERGAN) tablet 12.5 mg  12.5 mg Oral Q6H PRN Or  
 ondansetron (ZOFRAN) injection 4 mg  4 mg IntraVENous Q6H PRN  
 0.9% sodium chloride infusion 250 mL  250 mL IntraVENous PRN  
 glucose chewable tablet 16 g  4 Tab Oral PRN  
 dextrose (D50W) injection syrg 12.5-25 g  25-50 mL IntraVENous PRN  
 glucagon (GLUCAGEN) injection 1 mg  1 mg IntraMUSCular PRN  
 insulin lispro (HUMALOG) injection   SubCUTAneous QID WITH MEALS  cholecalciferol (VITAMIN D3) (1000 Units /25 mcg) tablet 2 Tab  2,000 Units Oral DAILY  cyanocobalamin (VITAMIN B12) tablet 1,000 mcg  1,000 mcg Oral DAILY  DULoxetine (CYMBALTA) capsule 60 mg  60 mg Oral DAILY  mirtazapine (REMERON) tablet 15 mg  15 mg Oral QHS  montelukast (SINGULAIR) tablet 10 mg  10 mg Oral QPM  
 
Care Plan discussed with: Patient, GI and Nurse Total time spent with patient: 30 minutes.  
Delores Frias MD

## 2021-01-14 NOTE — PROGRESS NOTES
Problem: Self Care Deficits Care Plan (Adult) Goal: *Acute Goals and Plan of Care (Insert Text) Description:  
FUNCTIONAL STATUS PRIOR TO ADMISSION: Patient was modified independent using a rollator for functional mobility. HOME SUPPORT: The patient lived alone with granddaughter and neighbor support Occupational Therapy Goals Initiated 1/14/2021 1. Patient will perform grooming and upper body dressing with supervision/set-up at edge of bed or in chair within 7 day(s). 2.  Patient will perform static standing > or = 2 minutes with bilateral UE support with minimal assistance/contact guard assist within 7 day(s). 3.  Patient will perform pursed lip breathing with min cues during functional transfers and after activity within 7 day(s). 4.  Patient will perform toilet transfers with minimal assistance/contact guard assist within 7 day(s). Outcome: Not Met OCCUPATIONAL THERAPY EVALUATION Patient: Colletta Hummingbird (81 y.o. female) Date: 1/14/2021 Primary Diagnosis: Left lower lobe pneumonia [J18.9] Procedure(s) (LRB): ESOPHAGOGASTRODUODENOSCOPY (EGD) (N/A) COLONOSCOPY (N/A) ESOPHAGOGASTRODUODENAL (EGD) BIOPSY (N/A) COLON BIOPSY (N/A) ENDOSCOPIC POLYPECTOMY (N/A) 10 Days Post-Op Precautions:   Fall, WBAT 
 
ASSESSMENT Based on the objective data described below, the patient presents with decreased activity tolerance for functional activity and basic ADL's, oxygen sats stable on 3 liters however with increased shortness of breath and RR with all activity. Encouragement for increased time out of bed. Will benefit from rehab Current Level of Function Impacting Discharge (ADLs/self-care): total assist LE ADLs, min to mod assist of 2 functional transfers Functional Outcome Measure: The patient scored 25/100 on the Barthel Index outcome measure Other factors to consider for discharge: was living alone Patient will benefit from skilled therapy intervention to address the above noted impairments. PLAN : 
Recommendations and Planned Interventions: self care training, functional mobility training, therapeutic exercise, balance training, therapeutic activities, endurance activities, neuromuscular re-education, patient education, home safety training, and family training/education Frequency/Duration: Patient will be followed by occupational therapy 5 times a week to address goals. Recommendation for discharge: (in order for the patient to meet his/her long term goals) Therapy up to 5 days/week in SNF setting This discharge recommendation: 
Has been made in collaboration with the attending provider and/or case management IF patient discharges home will need the following DME:   
 
 
SUBJECTIVE:  
Patient stated I want to go back to bed.  did report minimal sleep last p.m. OBJECTIVE DATA SUMMARY:  
HISTORY:  
Past Medical History:  
Diagnosis Date Asthma Atrial fibrillation ([address] 11/16/2018 Autoimmune disease ([address]   
 fibromyalgia CAD (coronary artery disease) 10/9/2015 Closed wedge compression fracture of T7 vertebra ([address] 11/13/2018 COPD (chronic obstructive pulmonary disease) ([address] Diabetes ([address] DVT (deep vein thrombosis) in pregnancy GERD (gastroesophageal reflux disease) Heart failure ([address] History of vascular access device 09/30/2020  
 4f midline, 12cm at 1cm out placed in L Cephalic by Bard Hope, RN  
 HTN (hypertension) NSTEMI (non-ST elevated myocardial infarction) ([address] 10/9/2015 Obesity (BMI 30-39.9) 11/13/2018 PAD (peripheral artery disease) ([address] prior stenting Sleep apnea   
 wears CPAP Statin intolerance Past Surgical History:  
Procedure Laterality Date COLONOSCOPY N/A 1/4/2021 COLONOSCOPY performed by Agatha Hartman MD at [address] [place] [address] Se HX COLOSTOMY and reversal, post episiotomy repair [facility] UROLOGICAL  2009  
 bladder sling IR KYPHOPLASTY LUMBAR  10/8/2020 MA ABDOMEN SURGERY PROC UNLISTED    
 hital hernia repair, gall bladder removed MA AMPUTATION TRANSMETACARPAL Right 06/12/2019  
 entire ring finger, 2nd & 3rd fingers (transmetacarpal) MA BREAST SURGERY PROCEDURE UNLISTED    
 lumpectomy MA CARDIAC SURG PROCEDURE UNLIST  10/9/15  
 2 stents Expanded or extensive additional review of patient history:  
 
Home Situation Home Environment: Private residence # Steps to Enter: 0 Wheelchair Ramp: No 
One/Two Story Residence: One story Living Alone: Yes Support Systems: Family member(s), Friends \ neighbors Patient Expects to be Discharged to[de-identified] Rehabilitation facility Current DME Used/Available at Home: Grab bars, Shower chair, Walker, rolling, Walker, rollator, Wheelchair Tub or Shower Type: Shower Hand dominance: Right EXAMINATION OF PERFORMANCE DEFICITS: 
Cognitive/Behavioral Status: 
Neurologic State: Alert Orientation Level: Oriented X4 Cognition: Follows commands; Appropriate for age attention/concentration Perception: Appears intact Perseveration: No perseveration noted Safety/Judgement: Awareness of environment Skin: noted thin skin with abrasion/scars, LE's distally red/purple and minimal drainage of right LE Edema: all extremities Hearing: Auditory Auditory Impairment: None Vision/Perceptual:   
       
Corrective Lenses: Glasses Range of Motion: 
AROM: Generally decreased, functional 
  
  
Strength: 
Strength: Generally decreased, functional 
  
  
 Balance: 
Sitting: Impaired Sitting - Static: Good (unsupported) Sitting - Dynamic: Not tested Standing: Impaired; Without support Standing - Static: Constant support; Fair 
Standing - Dynamic : Not tested Functional Mobility and Transfers for ADLs: 
Bed Mobility: Rolling: Moderate assistance;Assist x2; Additional time; Adaptive equipment Supine to Sit: Moderate assistance;Assist x2; Additional time; Adaptive equipment;Bed Modified Transfers: 
Sit to Stand: Assist x2;Minimum assistance; Additional time Stand to Sit: Minimum assistance;Assist x2;Adaptive equipment Bed to Chair: Minimum assistance; Adaptive equipment; Additional time;Assist x1 Toilet Transfer : Minimum assistance;Assist x2;Adaptive equipment(stand pivot) ADL Assessment: 
Feeding: Modified independent Oral Facial Hygiene/Grooming: Setup Bathing: Moderate assistance Upper Body Dressing: Minimum assistance Lower Body Dressing: Total assistance Toileting: Maximum assistance; Total assistance ADL Intervention and task modifications: 
  
 
  
Educated on role of OT, pursed lip breathing, benefit of increased time out of bed Cognitive Retraining Safety/Judgement: Awareness of environment Functional Measure: 
Barthel Index: 
 
Bathin Bladder: 5 Bowels: 5 Groomin Dressin Feedin Mobility: 0 Stairs: 0 Toilet Use: 5 Transfer (Bed to Chair and Back): 5 Total: 25/100 The Barthel ADL Index: Guidelines 1. The index should be used as a record of what a patient does, not as a record of what a patient could do. 2. The main aim is to establish degree of independence from any help, physical or verbal, however minor and for whatever reason. 3. The need for supervision renders the patient not independent. 4. A patient's performance should be established using the best available evidence. Asking the patient, friends/relatives and nurses are the usual sources, but direct observation and common sense are also important. However direct testing is not needed. 5. Usually the patient's performance over the preceding 24-48 hours is important, but occasionally longer periods will be relevant. 6. Middle categories imply that the patient supplies over 50 per cent of the effort. 7. Use of aids to be independent is allowed. Shyla Lovell., Barthel, D.W. (9803). Functional evaluation: the Barthel Index. [facility] (14)2. DAMI Mckenna, Vincent Lo., [place] Vo., [place], [facility] (1999). Measuring the change indisability after inpatient rehabilitation; comparison of the responsiveness of the Barthel Index and Functional [place] Measure. Journal of Neurology, Neurosurgery, and Psychiatry, 66(4), 559-790. EDUARDO DeyA, SANIYA Parry, & Alirio Mccartney M.A. (2004.) Assessment of post-stroke quality of life in cost-effectiveness studies: The usefulness of the Barthel Index and the EuroQoL-5D. [facility], 13, 785-91 Occupational Therapy Evaluation Charge Determination History Examination Decision-Making LOW Complexity : Brief history review  MEDIUM Complexity : 3-5 performance deficits relating to physical, cognitive , or psychosocial skils that result in activity limitations and / or participation restrictions MEDIUM Complexity : Patient may present with comorbidities that affect occupational performnce. Miniml to moderate modification of tasks or assistance (eg, physical or verbal ) with assesment(s) is necessary to enable patient to complete evaluation Based on the above components, the patient evaluation is determined to be of the following complexity level: LOW Pain Rating: Not rated Activity Tolerance:  
Fair, requires frequent rest breaks, observed SOB with activity, and sats > 94% on 3 liters After treatment patient left in no apparent distress:   
Sitting in chair and Call bell within reach COMMUNICATION/EDUCATION:  
The patients plan of care was discussed with: Physical therapist and Registered nurse. Home safety education was provided and the patient/caregiver indicated understanding. and Patient/family have participated as able in goal setting and plan of care. This patients plan of care is appropriate for delegation to [facility]. Thank you for this referral. 
Benoit ePrez OTR/L Time Calculation: 22 mins

## 2021-01-14 NOTE — PROGRESS NOTES
4:30 PM 
Medicare pt has received, reviewed, and signed 2nd IM letter informing them of their right to appeal the discharge. Signed copied has been placed on pt bedside chart. Pt sleeping soundly, left 2nd IM letter at bedside. Pt received another 2nd IM letter on 1/11/21. Josefa Angela

## 2021-01-14 NOTE — PROGRESS NOTES
2000- Initial assessment performed. VSS in flowsheet. Pt remains on 3L NC, NSR on the monitor. Pt denies pain/SOB. RU arm skin tear redressed with Hydrocolloid dressing. Pt denies further needs at this time.  
 
0000- Re-assessment complete. No changes at this time.  
 
0400- Re-assessment complete. No changes at this time.  
 
0730-Bedside and Verbal shift change report given to MADAI Vergara (oncoming nurse) by MADAI Randhawa (offgoing nurse). Report included the following information SBAR, Kardex, Intake/Output and MAR.

## 2021-01-14 NOTE — PROGRESS NOTES
TRANSFER - OUT REPORT: 
 
Verbal report given to Mary Jo Pena on Ynes Beckford  being transferred to [facility] for routine progression of care Report consisted of patients Situation, Background, Assessment and  
Recommendations(SBAR). .I informed Dr Naldo Ponce that pt will need a Covid 19 test before coming back to [facility]. Per admissions in [address] [place] [address] can be a rapid Covid 19 test.Dr Sanchez update Dr Jennifer Agustin know that pt needs to be completed   in order to return to [facility]. .If pt does not go to [facility] in [facility] will need to be notified tomorrow(Friday) that pt has an anticipated Saturday or Sunday discharge so they can make arrangements for pt or pt will not be able to be admitted to [facility] until Monday. Please read my progress note from earlier today for more details.  
 
Zachery Schaumann

## 2021-01-14 NOTE — PROGRESS NOTES
TRANSFER - OUT REPORT: 
 
Verbal report given to Juliana RN(name) on Jesus Asencio  being transferred to 5th floor (unit) for routine progression of care Report consisted of patients Situation, Background, Assessment and  
Recommendations(SBAR). Information from the following report(s) SBAR, Kardex, Intake/Output, Recent Results and Cardiac Rhythm NSR was reviewed with the receiving nurse. Lines:  
Peripheral IV 01/10/21 Anterior; Left Forearm (Active) Site Assessment Clean, dry, & intact 01/14/21 4121 Phlebitis Assessment 0 01/14/21 0806 Infiltration Assessment 0 01/14/21 0806 Dressing Status Clean, dry, & intact 01/14/21 4188 Dressing Type Transparent 01/14/21 0806 Hub Color/Line Status Blue 01/14/21 8742 Action Taken Open ports on tubing capped 01/14/21 0806 Alcohol Cap Used Yes 01/14/21 1961 Peripheral IV 01/13/21 Left;Upper Arm (Active) Site Assessment Clean, dry, & intact 01/14/21 2199 Phlebitis Assessment 0 01/14/21 0806 Infiltration Assessment 0 01/14/21 0806 Dressing Status Clean, dry, & intact 01/14/21 7000 Dressing Type Transparent 01/14/21 0806 Hub Color/Line Status Pink 01/14/21 0806 Action Taken Open ports on tubing capped 01/14/21 0806 Alcohol Cap Used Yes 01/14/21 7274 Opportunity for questions and clarification was provided. Patient transported with: 
 Monitor O2 @ 3 liters Registered Nurse

## 2021-01-14 NOTE — PROGRESS NOTES
TRANSFER - IN REPORT: 
 
Verbal report received  From [facility] (name) on [facility] [address] Shubham(patient name) being transferred to [facility](unit) for routine progression of care Report consisted of patients Situation, Background, Assessment and  
Recommendations(SBAR). Transition of Care Plan from [facility](name) RUR 44% high Is This a Readmission NO Is this a Bundle NO Recommendations(SBAR).  .I informed Dr Sonali Wolff that pt will need a Covid 19 test before coming back to [facility]. Per admissions in [address] [place] [address] can be a rapid Covid 19 test.Dr Sanchez update Dr Merna Sacks know that pt needs to be completed   in order to return to [facility]. .If pt does not go to [facility] in [facility] will need to be notified tomorrow(Friday) that pt has an anticipated Saturday or Sunday discharge so they can make arrangements for pt or pt will not be able to be admitted to [facility] until Monday. MADAI Guerrero

## 2021-01-14 NOTE — PROGRESS NOTES
Transition of care note: 
 
RUR 45% LOS 12 days Plan: 1. I discussed pt with attending. Attending plans to discharge pt in the next 1 to 2 days. 2.I informed Dr Andrade Martinez that pt will need a Covid 19 test before coming back to [facility]. Per admissions in [address] [place] [address] can be a rapid Covid 19 test.Dr Sanchez update Dr Bonny Balderrama know that pt needs to be completed   in order to return to [facility]. 3.If pt does not go to [place] [id number] in [facility] will need to be notified tomorrow(Friday) that pt has an anticipated Saturday or Sunday discharge so they can make arrangements for pt or pt will not be able to be admitted to [facility] until Monday. 4.Pt is back on her baseline oxygen of 3 liters nc. 5.OT ordered. 6.PT notes and updated clinicals faxed to [facility] through [employer]. 7.Pt has orders to transfer to telemetry. 8.I am continuing to follow pt for disposition needs. Tanya Thomas 
 OT notes also downloaded and today's PT note.  
Tanya Thomas

## 2021-01-14 NOTE — PROGRESS NOTES
TRANSFER - IN REPORT: 
 
Verbal report received from Ursula(name) on Yina Graft  being received from ICU(unit) for routine progression of care Report consisted of patients Situation, Background, Assessment and  
Recommendations(SBAR). Information from the following report(s) SBAR was reviewed with the receiving nurse. Opportunity for questions and clarification was provided. Assessment completed upon patients arrival to unit and care assumed.

## 2021-01-15 NOTE — DISCHARGE SUMMARY
Physician Discharge Summary Patient ID:   
Pa Simeon [id number] 
37 y.o. 
1945 Mary Arcos MD 
 
Admit date: 1/1/2021 Discharge date and time: 1/15/2021 Admission Diagnoses: Left lower lobe pneumonia [J18.9]/GI bleed/A fib with RVR Discharge Medications:  
Current Discharge Medication List  
  
START taking these medications Details  
amiodarone (PACERONE) 400 mg tablet Take 1 Tab by mouth two (2) times a day. Qty: 60 Tab, Refills: 0  
  
dilTIAZem ER (CARDIZEM CD) 360 mg capsule Take 1 Cap by mouth daily. Qty: 30 Cap, Refills: 0  
  
lipase-protease-amylase (CREON 24,000) 24,000-76,000 -120,000 unit capsule Take 1 Cap by mouth three (3) times daily (with meals). Qty: 90 Cap, Refills: 0  
  
pantoprazole (PROTONIX) 40 mg tablet Take 1 Tab by mouth Daily (before breakfast). Qty: 30 Tab, Refills: 0 phosphorus (K PHOS NEUTRAL) 250 mg tablet Take 1 Tab by mouth two (2) times a day. Qty: 60 Tab, Refills: 0 CONTINUE these medications which have CHANGED Details  
predniSONE (DELTASONE) 20 mg tablet Take 20 mg by mouth daily (with breakfast). Qty: 30 Tab, Refills: 0 CONTINUE these medications which have NOT CHANGED Details  
aspirin 81 mg chewable tablet Take 81 mg by mouth daily. apixaban (ELIQUIS) 5 mg tablet Take 5 mg by mouth two (2) times a day. alendronate (FOSAMAX) 70 mg tablet Take 70 mg by mouth every thirty (30) days. On the 1st day of each month  
  
zinc oxide 20 % ointment Apply  to affected area three (3) times daily. Bilateral gluteal and perianal area each shift  
  
loperamide (IMODIUM) 2 mg capsule Take 4 mg by mouth two (2) times a day. cyanocobalamin 1,000 mcg tablet Take 1 Tab by mouth daily. Qty: 30 Tab, Refills: 0  
  
digoxin (LANOXIN) 0.125 mg tablet Take 1 Tab by mouth daily. Qty: 30 Tab, Refills: 0  
  
ferrous sulfate 325 mg (65 mg iron) tablet Take 1 Tab by mouth daily (with breakfast). Qty: 30 Tab, Refills: 0 tiotropium bromide (Spiriva Respimat) 2.5 mcg/actuation inhaler Take 2 Puffs by inhalation daily. sAXagliptin (ONGLYZA) 5 mg tab tablet Take 5 mg by mouth Daily (before dinner). magnesium oxide (MAG-OX) 400 mg tablet Take 400 mg by mouth nightly. potassium chloride SR (KLOR-CON 10) 10 mEq tablet Take 10 mEq by mouth two (2) times a day. mirtazapine (REMERON) 15 mg tablet Take 15 mg by mouth nightly. albuterol (PROVENTIL VENTOLIN) 2.5 mg /3 mL (0.083 %) nebulizer solution 2.5 mg by Nebulization route every six (6) hours as needed for Wheezing or Shortness of Breath. albuterol (PROAIR HFA) 90 mcg/actuation inhaler Take 2 Puffs by inhalation every four (4) hours as needed. lactobacillus rhamnosus gg 10 billion cell (CULTURELLE) 10 billion cell capsule Take 1 Cap by mouth daily. biotin 10,000 mcg cap Take 1 Cap by mouth daily. DULoxetine (CYMBALTA) 60 mg capsule Take 60 mg by mouth daily. cholecalciferol, vitamin D3, (Vitamin D3) 50 mcg (2,000 unit) tab Take 2,000 Units by mouth daily. azelastine-fluticasone (DYMISTA) 137-50 mcg/spray spry 2 Sprays by Nasal route daily as needed (allergies). mometasone-formoterol (DULERA) 200-5 mcg/actuation HFA inhaler Take 2 Puffs by inhalation two (2) times daily as needed. honey (MediHoney, honey,) 100 % topical paste Apply  to affected area daily. montelukast (Singulair) 10 mg tablet Take 10 mg by mouth every evening. acetaminophen (TYLENOL) 325 mg tablet Take 1 Tab by mouth every four (4) hours as needed for Pain. Qty: 30 Tab, Refills: 0 STOP taking these medications  
  
 metoprolol tartrate (LOPRESSOR) 25 mg tablet Comments:  
Reason for Stopping:   
   
 Omeprazole delayed release (PRILOSEC D/R) 20 mg tablet Comments:  
Reason for Stopping: Follow up Care: 1. Kyle Santiago MD with in 1 weeks out of rehab 2. specialists as directed. Diet:  Diabetic Diet Disposition: 
SNF. Advanced Directive: 
Discharge Exam: [See today's progress note.] CONSULTATIONS: Cardiology, Pulmonary/Intensive care and GI Significant Diagnostic Studies:  
Recent Labs  
  01/15/21 
0913 01/12/21 
2315 WBC 14.8* 13.0* HGB 7.9* 7.8* HCT 28.3* 26.8*  
 298 Lab Results Component Value Date/Time Hemoglobin A1c 5.9 (H) 01/02/2021 11:10 AM  
 
Recent Labs  
  01/15/21 
0913 01/12/21 
2315  140  
K 3.7 4.3  107 CO2 33* 30  
BUN 24* 24* CREA 1.10* 1.04* GLU 90 126* CA 7.9* 8.1*  
MG  --  2.1 Recent Labs  
  01/15/21 
0913 ALT 65 * TBILI 0.6 TP 5.6* ALB 2.4*  
GLOB 3.2 Lab Results Component Value Date/Time Glucose (POC) 86 01/15/2021 12:00 PM  
 Glucose (POC) 64 (L) 01/15/2021 11:38 AM  
 Glucose (POC) 65 01/15/2021 10:54 AM  
 Glucose (POC) 93 01/15/2021 06:58 AM  
 Glucose (POC) 103 (H) 01/15/2021 06:53 AM  
 
Lab Results Component Value Date/Time TSH 3.40 01/13/2021 11:29 AM  
 
 
HOSPITAL COURSE & TREATMENT RENDERED:  
1. See today's progress note: 
Daily Progress Note and Discharge Note: 1/15/2021 Alvin Courser eSrenity Mars MD 
  
   
Assessment/Plan:  
Left lower lobe pneumonia vrs localized pulmonary edema 
-Rocephin and azithromycin initially then off antibiotics per Pulmonary 
-Covid negative 
-pulmonology signed off - She will need labs rechecked and repeat CXR at rehab.   
  
Acute hypoxic respiratory failure 
-Continue with supplemental oxygen 
-Patient uses 3 L of oxygen at baseline 
  
Afib with RVR 
- recurrent episodes 
-monitor mag/phos/K+- recheck at Rehab and replete PRN 
- resumed Eliquis 
- lasix/diuretics per Cards - Dig, Amiodarone and Cardizem ordered per cards - Cardiology signed off and cleared for DC 1/15 
  
Anemia - acute blood loss on chronic 
-Occult blood positive inpt - recheck at rehab 
-Transfused 2 units PRBC inpt but Hb relatively stable thereafter - recheck CBC at Rehab 
-GI consulted- endoscopies 1/4 as under Data Review - Colonic polys path revealed tubular adenomas - follow up per GI 
  
Sepsis 
-Continue treatment of above 
- culture negative 
  
Elevated creatinine 
-monitor, treat as needed 
  
Hypertension 
-Continue with metoprolol 
  
Type 2 diabetes:  A1c 5.9 
- resume pre-hospital meds 
   
Anxiety/depression 
-Continue with Cymbalta and Remeron 
   
  
Problem List: 
          
Problem List as of 1/15/2021 Date Reviewed: 11/2/2020  
        Codes Class Noted - Resolved  
  Left lower lobe pneumonia ICD-10-CM: J18.9 ICD-9-CM: 442   1/1/2021 - Present  
     
  Leukocytosis ICD-10-CM: C35.495 ICD-9-CM: 288.60   11/3/2020 - Present  
     
  Chronic respiratory failure with hypoxia [facility]) ICD-10-CM: J96.11 
ICD-9-CM: 518.83, 799.02   11/2/2020 - Present  
  Overview Signed 11/2/2020 10:12 PM by Becca Billingsley MD  
    On 3L NC at home  
   
  
     
  Cellulitis of lower extremity ICD-10-CM: L03.119 ICD-9-CM: 509. 6   3/23/2020 - Present  
     
  ASO (arteriosclerosis obliterans) ICD-10-CM: I70.90 ICD-9-CM: 440. 9   9/5/2019 - Present  
     
  PVD (peripheral vascular disease) ([facility] ICD-10-CM: I73.9 ICD-9-CM: 443. 9   8/1/2019 - Present  
     
  Severe obesity (HCC) ICD-10-CM: E66.01 
ICD-9-CM: 278.01   7/30/2019 - Present  
     
  COPD (chronic obstructive pulmonary disease) (HCC) ICD-10-CM: J44.9 ICD-9-CM: 496   7/13/2019 - Present  
     
  Normocytic anemia ICD-10-CM: D64.9 ICD-9-CM: 437. 9   7/13/2019 - Present  
     
  PAD (peripheral artery disease) (HCC) ICD-10-CM: I73.9 ICD-9-CM: 443. 9   7/13/2019 - Present  
     
  Mild intermittent asthma ICD-10-CM: J45.20 ICD-9-CM: 493.90   5/17/2019 - Present  
     
  Brachial artery thrombus (HCC) ICD-10-CM: J64.5 ICD-9-CM: 444.21   4/26/2019 - Present  
     
  Sleep apnea ICD-10-CM: G47.30 ICD-9-CM: 780.57   1/5/2019 - Present  
  Overview Signed 1/5/2019  8:41 PM by Sujata Fernandes DO  
    wears CPAP 
   
  
      Atrial fibrillation ([facility] ICD-10-CM: I48.91 
ICD-9-CM: 427.31   11/16/2018 - Present  
     
  Obesity (BMI 30-39.9) (Chronic) ICD-10-CM: W21.7 ICD-9-CM: 278.00   11/13/2018 - Present  
     
  Recurrent deep vein thrombosis (DVT) (HCC) ICD-10-CM: I82.409 ICD-9-CM: 453.40   3/30/2018 - Present  
     
  Chronic anticoagulation (Chronic) ICD-10-CM: Z79.01 
ICD-9-CM: V58.61   3/30/2018 - Present  
     
  Essential hypertension (Chronic) ICD-10-CM: I10 
ICD-9-CM: 199. 9   1/22/2016 - Present  
     
  CAD (coronary artery disease) ICD-10-CM: I25.10 ICD-9-CM: 414.00   10/9/2015 - Present  
     
  Type II diabetes mellitus (HCC) (Chronic) ICD-10-CM: E11.9 ICD-9-CM: 250.00   10/9/2015 - Present  
     
  RESOLVED: Syncope and collapse ICD-10-CM: R55 
ICD-9-CM: 780. 2   9/29/2020 - 11/2/2020  
     
  RESOLVED: Cellulitis ICD-10-CM: L03.90 ICD-9-CM: 103. 9   3/23/2020 - 5/29/2020  
     
  RESOLVED: Hypokalemia ICD-10-CM: E87.6 ICD-9-CM: 276.8   3/23/2020 - 5/29/2020  
     
  RESOLVED: Hyponatremia ICD-10-CM: E87.1 ICD-9-CM: 276.1   3/23/2020 - 5/29/2020  
     
  RESOLVED: Diarrhea ICD-10-CM: R19.7 ICD-9-CM: 787.91   3/23/2020 - 5/29/2020  
     
  RESOLVED: Syncope and collapse ICD-10-CM: R55 
ICD-9-CM: 780. 2   7/13/2019 - 5/29/2020  
     
  RESOLVED: UTI (urinary tract infection) ICD-10-CM: N39.0 ICD-9-CM: 599.0   7/13/2019 - 11/2/2020  
     
  RESOLVED: Sepsis ([facility] ICD-10-CM: A41.9 ICD-9-CM: 038.9, 995.91   7/13/2019 - 11/3/2020  
     
  RESOLVED: Leg wound, left ICD-10-CM: A71.016R ICD-9-CM: 891.0   7/13/2019 - 5/29/2020  
     
  RESOLVED: Leg laceration, right, initial encounter ICD-10-CM: O24.011B ICD-9-CM: 894.0   7/13/2019 - 5/29/2020  
     
  RESOLVED: Cellulitis of right upper arm ICD-10-CM: L03.113 ICD-9-CM: 961. 3   5/17/2019 - 5/29/2020  
     
  RESOLVED: Gangrene of finger of right hand ([facility] ICD-10-CM: V74 
ICD-9-CM: 785. 4   5/17/2019 - 11/2/2020  
     
  RESOLVED: Bowel obstruction [facility]) ICD-10-CM: K17.965 ICD-9-CM: 560.9   1/8/2019 - 5/29/2020  
     
  RESOLVED: Partial small bowel obstruction ([facility] ICD-10-CM: K56.600 ICD-9-CM: 560.9   1/5/2019 - 5/29/2020  
     
  RESOLVED: Dyspnea ICD-10-CM: R06.00 
ICD-9-CM: 786.09   11/13/2018 - 5/29/2020  
     
  RESOLVED: ARF (acute renal failure) ([facility]) ICD-10-CM: N17.9 ICD-9-CM: 399. 9   11/13/2018 - 5/29/2020  
     
  RESOLVED: Closed wedge compression fracture of T7 vertebra ([facility]) ICD-10-CM: S51.658V ICD-9-CM: 865. 2   11/13/2018 - 5/29/2020  
     
  RESOLVED: Type 2 diabetes with nephropathy ([facility] ICD-10-CM: E11.21 
ICD-9-CM: 250.40, 583.81   10/1/2018 - 11/2/2020  
     
  RESOLVED: Chest pain ICD-10-CM: R07.9 ICD-9-CM: 786.50   6/27/2018 - 5/29/2020  
     
  RESOLVED: Abdominal pain ICD-10-CM: R10.9 ICD-9-CM: 789.00   6/27/2018 - 5/29/2020  
     
  RESOLVED: Coronary artery disease involving native coronary artery without angina pectoris (Chronic) ICD-10-CM: I25.10 ICD-9-CM: 414.01   1/22/2016 - 11/2/2020  
     
  RESOLVED: HTN (hypertension) ICD-10-CM: I10 
ICD-9-CM: 398. 9   10/9/2015 - 1/22/2016  
     
  RESOLVED: NSTEMI (non-ST elevated myocardial infarction) ([facility] ICD-10-CM: I21.4 ICD-9-CM: 410.70   10/9/2015 - 5/29/2020  
     
   
  
Subjective:  
Particia Ee a 70 y. o. female who presented with shortness of breath which has been progressively worsening since this weekend.  She reports having by cough and sinus congestion.  Symptoms acutely worsened this morning.  She denies any chest pain, abdominal pain, diarrhea, or fever.  She has felt chills.  Upon arrival of EMS she was found to have saturations in the 70s.  She has a history of chronic COPD and uses 3 L of oxygen at baseline. [Dr Jauregui] 
  
1/2: Got 2 units PRBCs, but follow up labs unable to be obtained for the past 12 hours. I spoke with anesthesia and they will access her for central line. Pt tolerated PO, +BM yesterday.   Says her legs are no more swollen than her baseline. 
  
1/3: Sugar was in 50s before bed last night.  Held bedtime humalog and halved lantus.  Sugars better this AM.  Now in Afib with RVR.  Responded only briefly to dilt push.  Pt feels the tachycardia.  Denies increased SOB or CP.  Tolerated PO yesterday.  Due for bowel prep today, but on hold until cardiovascularly more stable. 
  
1/4:  She reports she feels better this AM.  Currently in sinus rhythm at this moment.  She is going to endoscopy this AM.  Hb up to 8.6.  WBC 14.4.  Will decrease Prednisone to 20mg per day.   
  
1/5:  Continues to feel better but still very fatigued.  Remains in sinus rhythm at this moment.  Upper and lower endoscopies as under Data Review.  Eliquis resumed.  
  
1/6:  About the same with no specific complaints except \"feel weak all over.\"  Back in A Fib but rate ok in 90s to low 100s.  Hb stable at 8.5.  She is willing to go to rehab so will get Case Management to see.  
  
1/7:  No specific complaints.  Hb 8.3.  Cards reported irreg rate more of PACs than A fib at this time.  Repeat CXR today. Stable for rehab when bed found.  Tubular adenomas on colon polyp path - follow up per GI.  
  
1/8:  No specific complaints.  Still very weak, cannot get up on her own and I do not think she can manage at assisted living without 24/7 care at this point. CXR yesterday without progression.  She declined rehab yesterday but after extended discussion she would agree to go to [facility].  PT still recommending rehab.  She is basically at baseline except her weakness. 
  
1/9:  She reports she is feeling better.  She wants to try to get up to chair today.  Hb down from 9.0 to 7.2 this AM - recheck later today and transfuse if needed.   
  
1/10:  Other than fatigue and weakness she reports no complaints.  She sat up on edge of bed yesterday.  Pulled IJ out last night and nurses report unable to get labs.  Pt requests no labs unless absolutely necessary.  Recheck of Hb  yesterday PM was 8.9 so will hold off on labs for now. Stable for rehab.   
  
1/11:  Reports she feels back to usual this AM.  Another episode of A fib with RVR last night - Cards restarted Dig, gave IV bolus of Cardizem and she allowed labs yesterday and converted back to sinus. Hb down a bit at 7.7 yesterday. AM labs not drawn yet.  
  
1/12:  Little change from yesterday she reports. Still c/o fatigue and weakness. Rate down to 80s. Remains afebrile. Repeat Covid negative. Hb yesterday AM 8.3. If this AM Hb is ok she could go to [facility] later today. Pt reports she feels ready to go to rehab.    
  
1/13:  She feels the rapid heart beat but no chest pain or shortness of breath. Recurrent Afib with RVR yesterday that was unable to be resolved on floor and transferred to step down unit. Mag and K+ ok. Rate in 130s to 150s this AM at the time of my visit. Cards is seeing her now.    
  
1/14:  Back in sinus this AM.  Cards started Amiodarone yesterday and converted. Now on Dilt, Amio and dig. No complaints this AM except weakness. Watch here another day and if still in sinus tomorrow then hopefully to rehab. Cards recommends Amiodarone x 1 month and follow up in Feb.  
  
1/15:  No complaints except fatigue. Out of ICU yesterday. Remains in sinus rhythm. AM labs pending. Hopefully to SNF rehab later today. 330pm:  She will need labs rechecked at rehab. Cardiology has signed off. Accepted at rehab and stable to go. Follow up with PCP when out of rehab. Follow up with GI and Card as directed. She will need repeat CXR in several wk, likely when still at rehab.   
  
Review of Systems: A comprehensive review of systems was negative except for that written in the HPI. 
  
Objective:  
Physical Exam:  
Visit Vitals BP (!) 140/60 (BP 1 Location: Left arm, BP Patient Position: At rest) Pulse 85 Temp 98.1 °F (36.7 °C) Resp 18 Ht 5' 7\" (1.702 m) Wt 113.4 kg (250 lb) SpO2 97% BMI 39.16 kg/m² O2 Flow Rate (L/min): 3 l/min O2 Device: Nasal cannula Temp (24hrs), Av.1 °F (36.7 °C), Min:97.5 °F (36.4 °C), Max:98.6 °F (37 °C) No intake/output data recorded.  1901 - 01/15 0700 In: 390 [P.O.:390] Out: 650 [Urine:650] General:  Alert, cooperative, no distress, appears stated age, moon-face. Head:  Normocephalic, without obvious abnormality, atraumatic. Eyes:  Conjunctivae/corneas clear. EOMs intact. Throat: Lips, mucosa, and tongue moist.  
Neck: Supple, symmetrical, trachea midline, no adenopathy, thyroid: no enlargement/tenderness/nodules, no carotid bruit and no JVD. Lungs:    diminished at bases, a few scattered rhonchi Chest wall:  No tenderness or deformity. Heart:   reg with rate in 80s at this time - monitor shows sinus. no murmur, click, rub or gallop. Abdomen:   Soft, non-tender. Bowel sounds normal. No masses,  No organomegaly. 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 Arms/hands much less swollen. No calf tenderness or cords. Pulses: 2+ and symmetric all extremities. Skin: Skin color, texture, turgor normal. No rashes Neurologic:   Alert and oriented X 3.  equal.  Gait not tested at this time. Sensation grossly normal to touch. Gross motor of extremities normal.    
  
Data Review:  
  Esophagogastroduodenoscopy (EGD) Colonoscopy Procedure Note Luigi Garcia : 1945 [facility] ELIAN Record [id number]: [id number] 
  
Indications:    Iron deficiency anemia, diarrhea Referring Tacho Canales MD 
Anesthesia/Sedation: see nursing notes Endoscopist:  Dr. Adebayo Guardado Assistants: None Procedure in Detail: After obtaining informed consent, positioning of the patient in the left lateral decubitus position, and conduction of a pre-procedure pause or \"time out\" the endoscope was introduced into the mouth and advanced to the duodenum.  A careful inspection was made, and findings or interventions are described below. Findings:  
Esophagus:numerous white exudates Stomach: antral erythema with soft submucosal mass Normal body and fundus Duodenum/jejunum: erythema, erosions second into third portion Complications/estimated blood loss: none Therapies:  biopsy of stomach antrum at site submucosal mass 
biopsy of duodenal second portion, third portion Specimens: biopsies Implants:none     
Endoscopist:  Dr. Romeo Colbert Assistants: None Complications:  None Estimate Blood Loss:  None 
  
Colonoscopy Procedure in Detail: After obtaining informed consent, positioning of the patient in the left lateral decubitus position, and conduction of a pre-procedure pause or \"time out\" the endoscope was introduced into the anus and advanced to the terminal ileum.  The quality of the colonic preparation was adequate.  A careful inspection was made as the colonoscope was withdrawn, findings and interventions are described below. Appendiceal orifice photographed Findings:  
    - Diverticulosis Two sessile 12 mm polyps Specimens:  
 polyps removed cold snare; random biopsies cecum, ascending Implants: none Complications:  
None; patient tolerated the procedure well. Estimated blood loss: none Impression: 
Candida esophagitis, gastritis, duodenitis, colon polyps, diverticulosis.  Antral mass likely a benign lipoma Recommendations:  
  - Await pathology. 
   - anticoagulate tomorrow  
Resume diet; add creon, nystatin suspension Thank you for entrusting me with this patient's care. Mary Lou Mendy do not hesitate to contact me with any questions or if I can be of assistance with any of your other patients' GI needs. Signed Hunter Vargas MD 
                      [facility] [address], [id number]  
  
Port CXR 1/7/21 IMPRESSION: Unchanged left basilar airspace disease and small left pleural 
Effusion 
  
Signed: 
Cesar Brito MD 
1/15/2021 3:41 PM

## 2021-01-15 NOTE — PROGRESS NOTES
Bedside shift change report given to Danelle (oncoming nurse) by Luther Benoit (offgoing nurse). Report included the following information SBAR, Kardex, MAR and Recent Results.

## 2021-01-15 NOTE — PROGRESS NOTES
Transition of Care Plan to SNF/Rehab Communication to Patient/Family: Met with patient and family and they are agreeable to the transition plan. The Plan for Transition of Care is related to the following treatment goals: The Patient and/or patient representative was provided with a choice of provider and agrees  with the discharge plan. Yes [x] No [] A Freedom of choice list was provided with basic dialogue that supports the patient's individualized plan of care/goals and shares the quality data associated with the providers. Yes [x] No [] SNF/Rehab Transition: 
Patient has been accepted to [facility] SNF/Rehab and meets criteria for admission. Patient will transported by Prifloat stretcher and expected to leave at 5 pm. 
 
Communication to SNF/Rehab: 
Bedside RN, Severiano Chavez, has been notified to update the transition plan to the facility and call report ([phone]). Discharge information has been updated on the AVS. And communicated to facility via Sharelook/[employer], or CC link. Nursing Please include all hard scripts for controlled substances, med rec and dc summary, and AVS in packet. Reviewed and confirmed with facility, [facility], can manage the patient care needs for the following:  
 
Moriah Ala with (X) only those applicable: 
Medication: 
[x]Medications are available at the facility []IV Antibiotics []Controlled Substance  hard copies available sent. []Weekly Labs Equipment: 
[]CPAP/BiPAP []Wound Vacuum [][place] or Urinary Device []PICC/Central Line []Nebulizer []Ventilator Treatment: 
[]Isolation (for MRSA, VRE, etc.) []Surgical Drain Management []Tracheostomy Care 
[]Dressing Changes []Dialysis with transportation []PEG Care []Oxygen []Daily Weights for Heart Failure Dietary: 
[]Any diet limitations []Tube Feedings []Total Parenteral Management (TPN) Financial Resources: 
[]Medicaid Application Completed []UAI Completed and copy given to pt/family 
and copy given to pt/family 
[]A screening has previously been completed. []Level II Completed 
 
[] Private pay individual who will not become  
financially eligible for Medicaid within 6 months from admission to a [address] [place] [address] facility. [] Individual refused to have screening conducted. []Medicaid Application Completed []The screening denied because it was determined individual did not need/did not qualify for nursing facility level of care. [] Out of state residents seeking direct admission to a [address] facility. [] Individuals who are inpatients of an out of state hospital, or in state or out of state veterans/ hospital and seek direct admission to a [address] facility [] Individuals who are pateints or residents of a state owned/operated facility that is licensed by Department of [employer] ([employer]) and seek direct admission to [address] facility [] A screening not required for enrollment in [address] [address] [address] services as set out in 12 VAC 30- [] [facility]) staff shall perform screenings of the [facility] clients. Advanced Care Plan: 
[]Surrogate Decision Maker of Care 
[]POA []Communicated Code Status and copy sent. Other: BALTA Morin

## 2021-01-15 NOTE — PROGRESS NOTES
Cardiology Progress Note NAME:  Jasmyn Duncan :   1945 MRN:   [id number] Assessment/Plan: 1. Paroxysmal a fib: brief run of a fib overnight. Will inc po dilt to 360 mg every day, cont po dilt and short term amio. Cont low dose dig. Ck level in 1 week. (1.5 ). Cont OAC. 2. HF p EF: euvolemic. 3. DM:  
4. COPD: baseline oxygen requirements 3 liters at home. 5. HTN: stable 6. Obesity: Body mass index is 39.16 kg/m². 7. Anemia:  
 
 
Will see prn/pls call if needed. Has op followup Pt personally seen and examined. Chart reviewed. Agree with advanced NP's history, exam and  A/P with changes/additons. PAF - Cardizem dose inc 
 
 
[email or web address]. MD, [facility] [place] Subjective:  
Ms. Swetha Truong is a 69 yo with history of Afib, CAD, HFpEF, DM, Recurrent DVT, COPD on 3L NC at home, PAD s/p left fem pop,  Asthma, HTN and Obesity who is admitted with Acute resp failure, LLL PNA, Anemia and GI Bleed (occult stool + blood),  
 
 
  
Cardiac ROS: Patient denies any exertional chest pain, dyspnea, palpitations, syncope, orthopnea, edema or paroxysmal nocturnal dyspnea. Previous Cardiac Eval 
Lipids 8/10/18: , HDL 95, HDL 3.1, , VLDL 91 Cardiac Testing/ Procedures: 
  
A. Cardiac Cath/PCI: 18 At [facility] - LAD stent/PTCA of D1 
  
10/9/15 L Main: Medl;Nml 
  
LAD: Prox- Med; 50%; Distal - small; D1 - Small to med - prox 60% 
  
LCflex: Large; Tortuous; MLI; GUILLERMO - med - MLI 
  
RCA: Med; Prox 80%; Distal 95% just before bifurcation into small PDA and PLB 
  
LVEDP: 22 
  
LVEF: 55%/ Mild basal inf hypokinesis 
  
No significant gradient across aortic valve. 
  
PCI: JR4 guide/BMW Pre dil with 2 by 12 balloon BMS 2.25 by 22 deployed at high [place] distally BMS 2.5 by 18 deployed at high [place] proximal lesion Post dil with 2.5 by 15 
  
  
B. ECHO/MEE: 2018 - Left ventricle: Systolic function was normal. Ejection fraction was estimated to be 60 %. There were no regional wall motion abnormalities. Doppler parameters were consistent with abnormal left ventricular 
relaxation (grade 1 diastolic dysfunction). Aortic valve: Leaflets exhibited normal cuspal separation and good 
mobility. Not well visualized. 
  
10/11/15 - Left ventricle: Systolic function was vigorous. Ejection fraction was 
estimated in the range of 65 % to 70 %. There were no regional wall motion 
abnormalities. 
  
C. StressNuclear/Stress ECHO/Stress test: 
  
D. Vascular: 
  
E. EP: 
  
F. Miscellaneous: 
  
Cardaic cath at [address] [place] [address] 18: radial - SAMUEL to ALD and ballon angioplasty to 2nd dx. Dr Kamini Cat. Echo 6/3/18 LVEF 65-70% no RWMA. Mild LVH. Mild calcified annulus Review of Systems: No nausea, indigestion, vomiting, pain, cough, sputum. No bleeding. Taking po. Appetite ok. Objective:  
 
Visit Vitals /65 (BP 1 Location: Left arm, BP Patient Position: At rest) Pulse 93 Temp 97.8 °F (36.6 °C) Resp 18 Ht 5' 7\" (1.702 m) Wt 113.4 kg (250 lb) SpO2 97% BMI 39.16 kg/m² O2 Flow Rate (L/min): 3 l/min O2 Device: Nasal cannula Temp (24hrs), Av °F (36.7 °C), Min:97.5 °F (36.4 °C), Max:98.6 °F (37 °C) No intake/output data recorded.  1901 - 01/15 0700 In: 390 [P.O.:390] Out: 650 [Urine:650] TELE: brief run a fib 100-120 > SR General: AAOx3 cooperative, no acute distress. HEENT: Atraumatic. Pink and moist.  Anicteric sclerae. Neck : Supple, no thyromegaly. Lungs: Dim bases Heart: regular rhythm, no murmur, no rubs, no gallops. No JVD. Abdomen: Soft, non-distended, non-tender. + Bowel sounds. Extremities: No edema Neurologic: Grossly intact. Alert and oriented X 3. No acute neurological distress. Psych: Good insight. Not anxious or agitated. Care Plan discussed with: 
  Comments Patient x Family RN x Care Manager x Consultant:  x Data Review: No lab exists for component: ITNL No results for input(s): CPK, CKMB, TROIQ in the last 72 hours. Recent Labs  
  01/12/21 
2315   
K 4.3  CO2 30 BUN 24* CREA 1.04* * MG 2.1 WBC 13.0* HGB 7.8* HCT 26.8*  
 No results for input(s): INR, PTP, APTT, INREXT, INREXT in the last 72 hours. Medications reviewed Current Facility-Administered Medications Medication Dose Route Frequency  dilTIAZem ER (CARDIZEM CD) capsule 360 mg  360 mg Oral DAILY  albuterol-ipratropium (DUO-NEB) 2.5 MG-0.5 MG/3 ML  3 mL Nebulization Q6H RT  
 budesonide (PULMICORT) 500 mcg/2 ml nebulizer suspension  500 mcg Nebulization BID RT  
 amiodarone (CORDARONE) tablet 400 mg  400 mg Oral BID  [START ON 1/21/2021] amiodarone (CORDARONE) tablet 200 mg  200 mg Oral DAILY  digoxin (LANOXIN) tablet 0.125 mg  0.125 mg Oral DAILY  insulin glargine (LANTUS) injection 18 Units  18 Units SubCUTAneous QHS  ferrous sulfate tablet 325 mg  1 Tab Oral DAILY WITH BREAKFAST  loperamide (IMODIUM) capsule 2 mg  2 mg Oral BID  lipase-protease-amylase (CREON 24,000) capsule 1 Cap  1 Cap Oral TID WITH MEALS  
 apixaban (ELIQUIS) tablet 5 mg  5 mg Oral BID  predniSONE (DELTASONE) tablet 20 mg  20 mg Oral DAILY WITH BREAKFAST  phosphorus (K PHOS NEUTRAL) 250 mg tablet 1 Tab  1 Tab Oral BID  pantoprazole (PROTONIX) tablet 40 mg  40 mg Oral ACB  
 0.9% sodium chloride infusion 250 mL  250 mL IntraVENous PRN  
 0.9% sodium chloride infusion 250 mL  250 mL IntraVENous PRN  
 sodium chloride (NS) flush 5-10 mL  5-10 mL IntraVENous PRN  
 sodium chloride (NS) flush 5-40 mL  5-40 mL IntraVENous Q8H  
 sodium chloride (NS) flush 5-40 mL  5-40 mL IntraVENous PRN  
 acetaminophen (TYLENOL) tablet 650 mg  650 mg Oral Q6H PRN Or  
 acetaminophen (TYLENOL) suppository 650 mg  650 mg Rectal Q6H PRN  polyethylene glycol (MIRALAX) packet 17 g  17 g Oral DAILY PRN  
  promethazine (PHENERGAN) tablet 12.5 mg  12.5 mg Oral Q6H PRN Or  
 ondansetron (ZOFRAN) injection 4 mg  4 mg IntraVENous Q6H PRN  
 0.9% sodium chloride infusion 250 mL  250 mL IntraVENous PRN  
 glucose chewable tablet 16 g  4 Tab Oral PRN  
 dextrose (D50W) injection syrg 12.5-25 g  25-50 mL IntraVENous PRN  
 glucagon (GLUCAGEN) injection 1 mg  1 mg IntraMUSCular PRN  
 insulin lispro (HUMALOG) injection   SubCUTAneous QID WITH MEALS  cholecalciferol (VITAMIN D3) (1000 Units /25 mcg) tablet 2 Tab  2,000 Units Oral DAILY  cyanocobalamin (VITAMIN B12) tablet 1,000 mcg  1,000 mcg Oral DAILY  DULoxetine (CYMBALTA) capsule 60 mg  60 mg Oral DAILY  mirtazapine (REMERON) tablet 15 mg  15 mg Oral QHS  montelukast (SINGULAIR) tablet 10 mg  10 mg Oral QPM  
 
 
 
Matthew Alaniz NP

## 2021-01-15 NOTE — ROUTINE PROCESS
Bedside and Verbal shift change report given to MADAI Andrews (oncoming nurse) by Adarsh Lamb (offgoing nurse). Report included the following information SBAR and Kardex.

## 2021-01-15 NOTE — DISCHARGE INSTRUCTIONS
Patient Discharge Instructions    Luigi Garcia / [id number] : 1945    Admitted 2021 Discharged: 1/15/2021 3:41 PM     ACUTE DIAGNOSES:  Left lower lobe pneumonia [J18.9]    CHRONIC MEDICAL DIAGNOSES:  Problem List as of 1/15/2021 Date Reviewed: 2020          Codes Class Noted - Resolved    Left lower lobe pneumonia ICD-10-CM: J18.9  ICD-9-CM: 486  2021 - Present        Leukocytosis ICD-10-CM: D72.829  ICD-9-CM: 288.60  11/3/2020 - Present        Chronic respiratory failure with hypoxia [facility]) ICD-10-CM: J96.11  ICD-9-CM: 518.83, 799.02  2020 - Present    Overview Signed 2020 10:12 PM by Nitza Almaraz MD     On 3L NC at home              Cellulitis of lower extremity ICD-10-CM: L03.119  ICD-9-CM: 682.6  3/23/2020 - Present        ASO (arteriosclerosis obliterans) ICD-10-CM: I70.90  ICD-9-CM: 440.9  2019 - Present        PVD (peripheral vascular disease) ([facility] ICD-10-CM: I73.9  ICD-9-CM: 443.9  2019 - Present        Severe obesity ([facility] ICD-10-CM: E66.01  ICD-9-CM: 278.01  2019 - Present        COPD (chronic obstructive pulmonary disease) ([facility] ICD-10-CM: J44.9  ICD-9-CM: 753  2019 - Present        Normocytic anemia ICD-10-CM: D64.9  ICD-9-CM: 285.9  2019 - Present        PAD (peripheral artery disease) ([facility] ICD-10-CM: I73.9  ICD-9-CM: 443.9  2019 - Present        Mild intermittent asthma ICD-10-CM: J45.20  ICD-9-CM: 493.90  2019 - Present        Brachial artery thrombus (HCC) ICD-10-CM: I74.2  ICD-9-CM: 444.21  2019 - Present        Sleep apnea ICD-10-CM: G47.30  ICD-9-CM: 780.57  2019 - Present    Overview Signed 2019  8:41 PM by Panda Ulloa DO     wears CPAP             Atrial fibrillation ([facility] ICD-10-CM: I48.91  ICD-9-CM: 427.31  2018 - Present        Obesity (BMI 30-39.9) (Chronic) ICD-10-CM: K71.1  ICD-9-CM: 278.00  2018 - Present        Recurrent deep vein thrombosis (DVT) ([facility] ICD-10-CM: I82.409  ICD-9-CM: 453.40  3/30/2018 - Present        Chronic anticoagulation (Chronic) ICD-10-CM: Z79.01  ICD-9-CM: V58.61  3/30/2018 - Present        Essential hypertension (Chronic) ICD-10-CM: I10  ICD-9-CM: 401.9  1/22/2016 - Present        CAD (coronary artery disease) ICD-10-CM: I25.10  ICD-9-CM: 414.00  10/9/2015 - Present        Type II diabetes mellitus (HCC) (Chronic) ICD-10-CM: E11.9  ICD-9-CM: 250.00  10/9/2015 - Present        RESOLVED: Syncope and collapse ICD-10-CM: R55  ICD-9-CM: 780.2  9/29/2020 - 11/2/2020        RESOLVED: Cellulitis ICD-10-CM: L03.90  ICD-9-CM: 682.9  3/23/2020 - 5/29/2020        RESOLVED: Hypokalemia ICD-10-CM: E87.6  ICD-9-CM: 276.8  3/23/2020 - 5/29/2020        RESOLVED: Hyponatremia ICD-10-CM: E87.1  ICD-9-CM: 276.1  3/23/2020 - 5/29/2020        RESOLVED: Diarrhea ICD-10-CM: R19.7  ICD-9-CM: 787.91  3/23/2020 - 5/29/2020        RESOLVED: Syncope and collapse ICD-10-CM: R55  ICD-9-CM: 780.2  7/13/2019 - 5/29/2020        RESOLVED: UTI (urinary tract infection) ICD-10-CM: N39.0  ICD-9-CM: 599.0  7/13/2019 - 11/2/2020        RESOLVED: Sepsis ([address] ICD-10-CM: A41.9  ICD-9-CM: 038.9, 995.91  7/13/2019 - 11/3/2020        RESOLVED: Leg wound, left ICD-10-CM: U87.904E  ICD-9-CM: 891.0  7/13/2019 - 5/29/2020        RESOLVED: Leg laceration, right, initial encounter ICD-10-CM: Z70.749N  ICD-9-CM: 894.0  7/13/2019 - 5/29/2020        RESOLVED: Cellulitis of right upper arm ICD-10-CM: L03.113  ICD-9-CM: 682.3  5/17/2019 - 5/29/2020        RESOLVED: Gangrene of finger of right hand ([facility] ICD-10-CM: I96  ICD-9-CM: 785.4  5/17/2019 - 11/2/2020        RESOLVED: Bowel obstruction ([facility] ICD-10-CM: S58.870  ICD-9-CM: 560.9  1/8/2019 - 5/29/2020        RESOLVED: Partial small bowel obstruction ([facility] ICD-10-CM: K56.600  ICD-9-CM: 560.9  1/5/2019 - 5/29/2020        RESOLVED: Dyspnea ICD-10-CM: R06.00  ICD-9-CM: 786.09  11/13/2018 - 5/29/2020        RESOLVED: ARF (acute renal failure) ([facility] ICD-10-CM: N17.9  ICD-9-CM: 584.9  11/13/2018 - 5/29/2020        RESOLVED: Closed wedge compression fracture of T7 vertebra ([facility] ICD-10-CM: X24.076X  ICD-9-CM: 805.2  11/13/2018 - 5/29/2020        RESOLVED: Type 2 diabetes with nephropathy ([facility] ICD-10-CM: E11.21  ICD-9-CM: 250.40, 583.81  10/1/2018 - 11/2/2020        RESOLVED: Chest pain ICD-10-CM: R07.9  ICD-9-CM: 786.50  6/27/2018 - 5/29/2020        RESOLVED: Abdominal pain ICD-10-CM: R10.9  ICD-9-CM: 789.00  6/27/2018 - 5/29/2020        RESOLVED: Coronary artery disease involving native coronary artery without angina pectoris (Chronic) ICD-10-CM: I25.10  ICD-9-CM: 414.01  1/22/2016 - 11/2/2020        RESOLVED: HTN (hypertension) ICD-10-CM: I10  ICD-9-CM: 401.9  10/9/2015 - 1/22/2016        RESOLVED: NSTEMI (non-ST elevated myocardial infarction) [facility]) ICD-10-CM: I21.4  ICD-9-CM: 410.70  10/9/2015 - 5/29/2020              DISCHARGE MEDICATIONS:         · It is important that you take the medication exactly as they are prescribed. · Keep your medication in the bottles provided by the pharmacist and keep a list of the medication names, dosages, and times to be taken in your wallet. · Do not take other medications without consulting your doctor. DIET:  Diabetic Diet  ACTIVITY: Activity as tolerated    ADDITIONAL INFORMATION: If you experience any of the following symptoms then please call your primary care physician or return to the emergency room if you cannot get hold of your doctor: Fever, chills, nausea, vomiting, diarrhea, change in mentation, falling, bleeding, shortness of breath. FOLLOW UP CARE:  Dr. Lamin Herrera MD  you are to call and set up an appointment to see them with in 1 week out of rehab. Follow-up with specialists at directed by them      Information obtained by :  I understand that if any problems occur once I am at home I am to contact my physician. I understand and acknowledge receipt of the instructions indicated above. Physician's or R.N.'s Signature                                                                  Date/Time                                                                                                                                              Patient or Representative Signature                                                          Date/Time

## 2021-01-15 NOTE — PROGRESS NOTES
Problem: Mobility Impaired (Adult and Pediatric) Goal: *Acute Goals and Plan of Care (Insert Text) Description: FUNCTIONAL STATUS PRIOR TO ADMISSION: Patient was modified independent using a rolling walker for functional mobility. HOME SUPPORT PRIOR TO ADMISSION: The patient lived with daughter and grand-daughter but did not require assist. 
 
Physical Therapy Goals Revised 1/13/2021 Continue all goals for an additional 7 days. Initiated 1/5/2021 1. Patient will move from supine to sit and sit to supine  in bed with moderate assistance  within 7 day(s). 2.  Patient will transfer from bed to chair and chair to bed with moderate assistance  using the least restrictive device within 7 day(s). 3.  Patient will perform sit to stand with moderate assistance  within 7 day(s). 4.  Patient will ambulate with moderate assistance  for 50 feet with the least restrictive device within 7 day(s). Note: PHYSICAL THERAPY TREATMENT Patient: Dl Dodd (54 y.o. female) Date: 1/15/2021 Diagnosis: Left lower lobe pneumonia [J18.9] <principal problem not specified> Procedure(s) (LRB): ESOPHAGOGASTRODUODENOSCOPY (EGD) (N/A) COLONOSCOPY (N/A) ESOPHAGOGASTRODUODENAL (EGD) BIOPSY (N/A) COLON BIOPSY (N/A) ENDOSCOPIC POLYPECTOMY (N/A) 11 Days Post-Op Precautions: Fall, WBAT Chart, physical therapy assessment, plan of care and goals were reviewed. ASSESSMENT Patient continues with skilled PT services. Pt declined mobilizing out of bed with PT. Pt agreed to and performed LE AROM exercise with cues. Pt tolerated treatment fairly well. Continue goals. PLAN : 
Patient continues to benefit from skilled intervention to address the above impairments. Continue treatment per established plan of care. to address goals. Recommendation for discharge: (in order for the patient to meet his/her long term goals) Therapy up to 5 days/week in SNF setting This discharge recommendation: Has been made in collaboration with the attending provider and/or case management IF patient discharges home will need the following DME: rolling walker SUBJECTIVE:  
 
 
OBJECTIVE DATA SUMMARY:  
Critical Behavior: 
Neurologic State: Alert Orientation Level: Oriented to person, Oriented to place, Oriented to situation Cognition: Follows commands Safety/Judgement: Awareness of environment Therapeutic Exercises:  
Heel slides ankle pumps and hip abd/add to LEs Activity Tolerance:  
Fair After treatment patient left in no apparent distress:  
Supine in bed COMMUNICATION/COLLABORATION:  
The patients plan of care was discussed with: Physical therapist.  
 
Rhianna Latif PTA Time Calculation: 11 mins

## 2021-01-15 NOTE — PROGRESS NOTES
Problem: Falls - Risk of 
Goal: *Absence of Falls Outcome: Progressing Towards Goal 
Note: Fall Risk Interventions: 
Mobility Interventions: Bed/chair exit alarm, Communicate number of staff needed for ambulation/transfer, Patient to call before getting OOB, PT Consult for mobility concerns Medication Interventions: Teach patient to arise slowly, Patient to call before getting OOB Elimination Interventions: Call light in reach, Patient to call for help with toileting needs, Stay With Me (per policy) History of Falls Interventions: Bed/chair exit alarm, Door open when patient unattended, Investigate reason for fall Problem: Patient Education: Go to Patient Education Activity Goal: Patient/Family Education Outcome: Progressing Towards Goal 
  
Problem: Pressure Injury - Risk of 
Goal: *Prevention of pressure injury Outcome: Progressing Towards Goal 
Note: Pressure Injury Interventions: 
Sensory Interventions: Turn and reposition approx. every two hours (pillows and wedges if needed), Minimize linen layers, Keep linens dry and wrinkle-free Moisture Interventions: Minimize layers, Check for incontinence Q2 hours and as needed Activity Interventions: Pressure redistribution bed/mattress(bed type), PT/OT evaluation Mobility Interventions: Turn and reposition approx. every two hours(pillow and wedges), PT/OT evaluation, Pressure redistribution bed/mattress (bed type) Nutrition Interventions: Document food/fluid/supplement intake, Offer support with meals,snacks and hydration Friction and Shear Interventions: Minimize layers, Lift team/patient mobility team, HOB 30 degrees or less Problem: Patient Education: Go to Patient Education Activity Goal: Patient/Family Education Outcome: Progressing Towards Goal 
  
Problem: Risk for Spread of Infection Goal: Prevent transmission of infectious organism to others Outcome: Progressing Towards Goal 
  
 Problem: Patient Education:  Go to Education Activity Goal: Patient/Family Education Outcome: Progressing Towards Goal 
  
Problem: Diabetes Maintenance:Admission Goal: Activity/Safety Outcome: Progressing Towards Goal 
Goal: Diagnostic Tests/Procedures Outcome: Progressing Towards Goal 
Goal: Nutrition Outcome: Progressing Towards Goal 
Goal: Medications Outcome: Progressing Towards Goal 
Goal: Treatments/Interventions/Procedures Outcome: Progressing Towards Goal

## 2021-01-15 NOTE — PROGRESS NOTES
Hospital to [facility] 
                                                                      70 y.o.   female [address] [facility]   Room: 517/01    [facility] 5M1 MED SURG 1  Unit Phone# :  [phone] [address] sangeetha 
[facility] 5M1 MED SURG 1 
[address] [facility] [address] [id number] Dept: [phone] Loc: [phone] SITUATION Admitted:  1/1/2021         Attending Provider:  Nile Dakin, MD    
 
Consultations:  IP CONSULT TO PULMONOLOGY 
IP CONSULT TO GASTROENTEROLOGY 
IP CONSULT TO CARDIOLOGY 
IP CONSULT TO CARDIOLOGY 
 
PCP:  Sulma Heredia MD   [phone] Treatment Team: Attending Provider: Nile Dakin, MD; Hospitalist: Nile Dakin, MD; Physician: Genna Martinez MD; Utilization Review: Coretta Ibanez; Utilization Review: Lucita Canavan; Utilization Review: Maya Mancilla RN; Care Manager: Kevin Acevedo Admitting Dx: Left lower lobe pneumonia [J18.9] Principal Problem: <principal problem not specified> 
 
11 Days Post-Op of  
Procedure(s): ESOPHAGOGASTRODUODENOSCOPY (EGD) COLONOSCOPY 
ESOPHAGOGASTRODUODENAL (EGD) BIOPSY COLON BIOPSY ENDOSCOPIC POLYPECTOMY  
BY: Bryanna Schaffer MD             ON: 1/4/2021 Code Status: Full Code Advance Directives:  
Advance Care Planning 1/15/2021 Patient's Healthcare Decision Maker is: -  
Primary Decision Maker Name -  
Confirm Advance Directive Yes, on file Patient Would Like to Complete Advance Directive - Does the patient have other document types - (Send w/patient) Yes Not W Pt  
 
 
Isolation:  Contact       MDRO: MRSA, ESBL Pain Medications given:      Last dose: 
 
Special Equipment needed: no  Type of equipment: 
  
  BACKGROUND Allergies: Allergies Allergen Reactions  Advair Diskus [Fluticasone Propion-Salmeterol] Rash  Aspartame Other (comments)  Breo Ellipta [Fluticasone Furoate-Vilanterol] Rash  Bumex [Bumetanide] Myalgia  Ciprofloxacin Rash  Statins-Hmg-Coa Reductase Inhibitors Other (comments) Muscle pain Lipitor/crestor/zocor  Sulfa (Sulfonamide Antibiotics) Rash  Tetracycline Other (comments) musclepain  Torsemide Rash and Myalgia  Zetia [Ezetimibe] Myalgia Past Medical History:  
Diagnosis Date  Asthma  Atrial fibrillation ([facility] 11/16/2018  Autoimmune disease ([facility]   
 fibromyalgia  CAD (coronary artery disease) 10/9/2015  Closed wedge compression fracture of T7 vertebra ([facility] 11/13/2018  COPD (chronic obstructive pulmonary disease) (HCC)  Diabetes ([facility]  DVT (deep vein thrombosis) in pregnancy  GERD (gastroesophageal reflux disease)  Heart failure ([facility]  History of vascular access device 09/30/2020  
 4f midline, 12cm at 1cm out placed in L Cephalic by Zach Dyer RN  
 HTN (hypertension)  NSTEMI (non-ST elevated myocardial infarction) ([facility] 10/9/2015  Obesity (BMI 30-39.9) 11/13/2018  PAD (peripheral artery disease) ([facility] prior stenting  Sleep apnea   
 wears CPAP  
 Statin intolerance Past Surgical History:  
Procedure Laterality Date  COLONOSCOPY N/A 1/4/2021 COLONOSCOPY performed by Braeden Brand MD at [address] [facility]  HX COLOSTOMY    
 and reversal, post episiotomy repair [address]  HX UROLOGICAL  2009  
 bladder sling  IR KYPHOPLASTY LUMBAR  10/8/2020  MI ABDOMEN SURGERY PROC UNLISTED    
 hital hernia repair, gall bladder removed  MI AMPUTATION TRANSMETACARPAL Right 06/12/2019  
 entire ring finger, 2nd & 3rd fingers (transmetacarpal)  MI BREAST SURGERY PROCEDURE UNLISTED    
 lumpectomy  MI CARDIAC SURG PROCEDURE UNLIST  10/9/15  
 2 stents Medications Prior to Admission Medication Sig  
 aspirin 81 mg chewable tablet Take 81 mg by mouth daily.  apixaban (ELIQUIS) 5 mg tablet Take 5 mg by mouth two (2) times a day.  alendronate (FOSAMAX) 70 mg tablet Take 70 mg by mouth every thirty (30) days. On the 1st day of each month  zinc oxide 20 % ointment Apply  to affected area three (3) times daily. Bilateral gluteal and perianal area each shift  loperamide (IMODIUM) 2 mg capsule Take 4 mg by mouth two (2) times a day.  cyanocobalamin 1,000 mcg tablet Take 1 Tab by mouth daily.  digoxin (LANOXIN) 0.125 mg tablet Take 1 Tab by mouth daily.  ferrous sulfate 325 mg (65 mg iron) tablet Take 1 Tab by mouth daily (with breakfast).  tiotropium bromide (Spiriva Respimat) 2.5 mcg/actuation inhaler Take 2 Puffs by inhalation daily.  sAXagliptin (ONGLYZA) 5 mg tab tablet Take 5 mg by mouth Daily (before dinner).  metoprolol tartrate (LOPRESSOR) 25 mg tablet Take 25 mg by mouth daily as needed (For systolic >860).  magnesium oxide (MAG-OX) 400 mg tablet Take 400 mg by mouth nightly.  potassium chloride SR (KLOR-CON 10) 10 mEq tablet Take 10 mEq by mouth two (2) times a day.  predniSONE (DELTASONE) 10 mg tablet Take 10 mg by mouth two (2) times daily (with meals).  Omeprazole delayed release (PRILOSEC D/R) 20 mg tablet Take 20 mg by mouth two (2) times a day.  mirtazapine (REMERON) 15 mg tablet Take 15 mg by mouth nightly.  albuterol (PROVENTIL VENTOLIN) 2.5 mg /3 mL (0.083 %) nebulizer solution 2.5 mg by Nebulization route every six (6) hours as needed for Wheezing or Shortness of Breath.  albuterol (PROAIR HFA) 90 mcg/actuation inhaler Take 2 Puffs by inhalation every four (4) hours as needed.  lactobacillus rhamnosus gg 10 billion cell (CULTURELLE) 10 billion cell capsule Take 1 Cap by mouth daily.  biotin 10,000 mcg cap Take 1 Cap by mouth daily.  DULoxetine (CYMBALTA) 60 mg capsule Take 60 mg by mouth daily.  cholecalciferol, vitamin D3, (Vitamin D3) 50 mcg (2,000 unit) tab Take 2,000 Units by mouth daily.  azelastine-fluticasone (DYMISTA) 137-50 mcg/spray spry 2 Sprays by Nasal route daily as needed (allergies).  mometasone-formoterol (DULERA) 200-5 mcg/actuation HFA inhaler Take 2 Puffs by inhalation two (2) times daily as needed.  honey (MediHoney, honey,) 100 % topical paste Apply  to affected area daily.  montelukast (Singulair) 10 mg tablet Take 10 mg by mouth every evening.  acetaminophen (TYLENOL) 325 mg tablet Take 1 Tab by mouth every four (4) hours as needed for Pain. Hard scripts included in transfer packet no Vaccinations:   
Immunization History Administered Date(s) Administered  Influenza Vaccine (Madin Tobias Canine Kidney) PF 10/10/2015  Influenza Vaccine [employer] PF (>6 Mo Flulaval, Fluarix, and >3 Yrs Sánchez, Fluzone [id number]) 10/12/2020  Tdap 03/25/2019 Readmission Risks:    Known Risks: The Charlson CoMorbitiy Index tool is an evidenced based tool that has more automatic generated information. The tool looks at many different items such as the age of the patient, how many times they were admitted in the last calendar year, current length of stay in the hospital and their diagnosis. All of these items are pulled automatically from information documented in the chart from various places and will generate a score that predicts whether a patient is at low (less than 13), medium (13-20) or high (21 or greater) risk of being readmitted. ASSESSMENT Temp: 98.2 °F (36.8 °C) (01/15/21 1553) Pulse (Heart Rate): 88 (01/15/21 1553) Resp Rate: 18 (01/15/21 1553)           BP: (!) 144/54 (01/15/21 1553) O2 Sat (%): 97 % (01/15/21 1553) Weight: 113.4 kg (250 lb)    Height: 5' 7\" (170.2 cm) (01/04/21 0818) If above not within 1 hour of discharge: 
 
BP:_____  P:____  R:____ T:_____ O2 Sat: ___%  O2: ______ Active Orders Diet DIET DIABETIC CONSISTENT CARB Regular Orientation: oriented to time, place, person and situation Active Behaviors: None Active Lines/Drains:  (Peg Tube / Ba[place]a / CL or S/L?): no 
 
Urinary Status: Incontinent     Last BM: Last Bowel Movement Date: 01/11/21(per chart) Skin Integrity: Excoriation, Wound (add Wound LDA) Mobility: Very limited Weight Bearing Status: WBAT (Weight Bearing as Tolerated) Gait Training Assistive Device: Walker, rolling, Gait belt Ambulation - Level of Assistance: Minimal assistance, Assist x2, Additional time Distance (ft): 3 Feet (ft)(side stepping toward HOB) Lab Results Component Value Date/Time Glucose 90 01/15/2021 09:13 AM  
 Hemoglobin A1c 5.9 (H) 01/02/2021 11:10 AM  
 INR 1.1 09/30/2020 10:19 AM  
 INR 1.0 08/30/2019 03:18 PM  
 HGB 7.9 (L) 01/15/2021 09:13 AM  
 HGB 7.8 (L) 01/12/2021 11:15 PM  
  
  RECOMMENDATION See After Visit Summary (AVS) for: · Discharge instructions · [facility] · Special equipment needed (entered pre-discharge by Care Management) · Medication Reconciliation · Follow up Appointment(s) Report given/sent by:  Prashant Hicks Verbal report given to: Nurse Concetta Escobar  FAXED to:  
    
Estimated discharge time:  1/15/2021 [id number]

## 2021-01-15 NOTE — PROGRESS NOTES
HYPOGLYCEMIC EPISODE DOCUMENTATION 
 
Patient with hypoglycemic episode(s) at 1054(time) on 1/15/2021(date).   
BG value(s) pre-treatment 65   
Was patient symptomatic? [] yes, [] no 
Patient was treated with the following rescue medications/treatments: [] D50 
              [] Glucose tablets 
              [] Glucagon 
              [x] 8 oz  juice 
              [] 6oz reg soda 
              [] 8oz low fat milk 
BG value post-treatment: 86 
Once BG treated and value greater than 80mg/dl, pt was provided with the following: 
[] snack 
[x] meal

## 2021-01-15 NOTE — PROGRESS NOTES
CM Note: 
Spoke with Dr. Toney Olson to ask about d/c today. \"A. M. labs pending\" in Dr. Cheryl Will note. No labs were found. Pt is set up with [facility] transport. [facility] can accept pt today and her covid results are good through today. If pt does not d/c today, [facility] needs to know as they do not have intake staff on the weekend. She would also need another covid test.  Dr. Toney Olson was called and a message was left with the updated information. MADAI Guerrero

## 2021-01-15 NOTE — PROGRESS NOTES
01/14/21 6190 Chart and Patient  Assessment Pulmonary History 1 Surgical History 0 Chest X-ray 2 Respiratory Pattern 0 Mental Status 0 Breath Sounds 2 Cough 0 Level of Activity/Mobility 1 Respiratory Assessment Total Score 6

## 2021-01-15 NOTE — PROGRESS NOTES
Daily Progress Note and Discharge Note: 1/15/2021 Denise Moreno MD 
 
Assessment/Plan:  
Left lower lobe pneumonia vrs localized pulmonary edema 
-Rocephin and azithromycin initially then off antibiotics per Pulmonary 
-Covid negative 
-pulmonology signed off - She will need labs rechecked and repeat CXR at rehab.   
  
Acute hypoxic respiratory failure 
-Continue with supplemental oxygen 
-Patient uses 3 L of oxygen at baseline Afib with RVR 
- recurrent episodes 
-monitor mag/phos/K+- recheck at Rehab and replete PRN 
- resumed Eliquis 
- lasix/diuretics per Cards - Dig, Amiodarone and Cardizem ordered per cards - Cardiology signed off and cleared for DC 1/15 
  
Anemia - acute blood loss on chronic 
-Occult blood positive inpt - recheck at rehab 
-Transfused 2 units PRBC inpt but Hb relatively stable thereafter - recheck CBC at Rehab 
-GI consulted- endoscopies 1/4 as under Data Review - Colonic polys path revealed tubular adenomas - follow up per GI 
  
Sepsis 
-Continue treatment of above 
- culture negative 
  
Elevated creatinine 
-monitor, treat as needed 
  
Hypertension 
-Continue with metoprolol 
  
Type 2 diabetes:  A1c 5.9 
- resume pre-hospital meds 
   
Anxiety/depression 
-Continue with Cymbalta and Remeron 
   
 
Problem List: 
Problem List as of 1/15/2021 Date Reviewed: 11/2/2020 Codes Class Noted - Resolved Left lower lobe pneumonia ICD-10-CM: J18.9 ICD-9-CM: 301  1/1/2021 - Present Leukocytosis ICD-10-CM: N61.845 ICD-9-CM: 288.60  11/3/2020 - Present Chronic respiratory failure with hypoxia [facility]) ICD-10-CM: J96.11 
ICD-9-CM: 518.83, 799.02  11/2/2020 - Present Overview Signed 11/2/2020 10:12 PM by Gina Jeffries MD  
  On 3L NC at home Cellulitis of lower extremity ICD-10-CM: L03.119 ICD-9-CM: 682.6  3/23/2020 - Present ASO (arteriosclerosis obliterans) ICD-10-CM: I70.90 ICD-9-CM: 440.9  9/5/2019 - Present PVD (peripheral vascular disease) ([facility] ICD-10-CM: I73.9 ICD-9-CM: 443.9  8/1/2019 - Present Severe obesity ([facility] ICD-10-CM: E66.01 
ICD-9-CM: 278.01  7/30/2019 - Present COPD (chronic obstructive pulmonary disease) (HCC) ICD-10-CM: J44.9 ICD-9-CM: 205  7/13/2019 - Present Normocytic anemia ICD-10-CM: D64.9 ICD-9-CM: 285.9  7/13/2019 - Present PAD (peripheral artery disease) (HCC) ICD-10-CM: I73.9 ICD-9-CM: 443.9  7/13/2019 - Present Mild intermittent asthma ICD-10-CM: J45.20 ICD-9-CM: 493.90  5/17/2019 - Present Brachial artery thrombus (HCC) ICD-10-CM: X96.6 ICD-9-CM: 444.21  4/26/2019 - Present Sleep apnea ICD-10-CM: G47.30 ICD-9-CM: 780.57  1/5/2019 - Present Overview Signed 1/5/2019  8:41 PM by Chani Fuller DO  
  wears CPAP Atrial fibrillation [facility]) ICD-10-CM: I48.91 
ICD-9-CM: 427.31  11/16/2018 - Present Obesity (BMI 30-39.9) (Chronic) ICD-10-CM: K74.7 ICD-9-CM: 278.00  11/13/2018 - Present Recurrent deep vein thrombosis (DVT) (HCC) ICD-10-CM: I82.409 ICD-9-CM: 453.40  3/30/2018 - Present Chronic anticoagulation (Chronic) ICD-10-CM: Z79.01 
ICD-9-CM: V58.61  3/30/2018 - Present Essential hypertension (Chronic) ICD-10-CM: I10 
ICD-9-CM: 401.9  1/22/2016 - Present CAD (coronary artery disease) ICD-10-CM: I25.10 ICD-9-CM: 414.00  10/9/2015 - Present Type II diabetes mellitus (HCC) (Chronic) ICD-10-CM: E11.9 ICD-9-CM: 250.00  10/9/2015 - Present RESOLVED: Syncope and collapse ICD-10-CM: R55 
ICD-9-CM: 780.2  9/29/2020 - 11/2/2020 RESOLVED: Cellulitis ICD-10-CM: L03.90 ICD-9-CM: 682.9  3/23/2020 - 5/29/2020 RESOLVED: Hypokalemia ICD-10-CM: E87.6 ICD-9-CM: 276.8  3/23/2020 - 5/29/2020 RESOLVED: Hyponatremia ICD-10-CM: E87.1 ICD-9-CM: 276.1  3/23/2020 - 5/29/2020 RESOLVED: Diarrhea ICD-10-CM: R19.7 ICD-9-CM: 787.91  3/23/2020 - 5/29/2020 RESOLVED: Syncope and collapse ICD-10-CM: R55 
ICD-9-CM: 780.2  7/13/2019 - 5/29/2020 RESOLVED: UTI (urinary tract infection) ICD-10-CM: N39.0 ICD-9-CM: 599.0  7/13/2019 - 11/2/2020 RESOLVED: Sepsis ([facility] ICD-10-CM: A41.9 ICD-9-CM: 038.9, 995.91  7/13/2019 - 11/3/2020 RESOLVED: Leg wound, left ICD-10-CM: D79.104A ICD-9-CM: 891.0  7/13/2019 - 5/29/2020 RESOLVED: Leg laceration, right, initial encounter ICD-10-CM: O91.579T ICD-9-CM: 894.0  7/13/2019 - 5/29/2020 RESOLVED: Cellulitis of right upper arm ICD-10-CM: L03.113 ICD-9-CM: 682.3  5/17/2019 - 5/29/2020 RESOLVED: Gangrene of finger of right hand ([facility] ICD-10-CM: F42 
ICD-9-CM: 785.4  5/17/2019 - 11/2/2020 RESOLVED: Bowel obstruction ([facility] ICD-10-CM: P25.126 ICD-9-CM: 560.9  1/8/2019 - 5/29/2020 RESOLVED: Partial small bowel obstruction ([facility] ICD-10-CM: K56.600 ICD-9-CM: 560.9  1/5/2019 - 5/29/2020 RESOLVED: Dyspnea ICD-10-CM: R06.00 
ICD-9-CM: 786.09  11/13/2018 - 5/29/2020 RESOLVED: ARF (acute renal failure) (HCC) ICD-10-CM: N17.9 ICD-9-CM: 584.9  11/13/2018 - 5/29/2020 RESOLVED: Closed wedge compression fracture of T7 vertebra ([facility] ICD-10-CM: D41.786Y ICD-9-CM: 805.2  11/13/2018 - 5/29/2020 RESOLVED: Type 2 diabetes with nephropathy ([facility] ICD-10-CM: E11.21 
ICD-9-CM: 250.40, 583.81  10/1/2018 - 11/2/2020 RESOLVED: Chest pain ICD-10-CM: R07.9 ICD-9-CM: 786.50  6/27/2018 - 5/29/2020 RESOLVED: Abdominal pain ICD-10-CM: R10.9 ICD-9-CM: 789.00  6/27/2018 - 5/29/2020 RESOLVED: Coronary artery disease involving native coronary artery without angina pectoris (Chronic) ICD-10-CM: I25.10 ICD-9-CM: 414.01  1/22/2016 - 11/2/2020 RESOLVED: HTN (hypertension) ICD-10-CM: I10 
ICD-9-CM: 401.9  10/9/2015 - 1/22/2016 RESOLVED: NSTEMI (non-ST elevated myocardial infarction) ([facility] ICD-10-CM: I21.4 ICD-9-CM: 410.70  10/9/2015 - 5/29/2020 Subjective:  
Audrey Ruiz is a 70 y.o. female who presented with shortness of breath which has been progressively worsening since this weekend. She reports having by cough and sinus congestion. Symptoms acutely worsened this morning. She denies any chest pain, abdominal pain, diarrhea, or fever. She has felt chills. Upon arrival of EMS she was found to have saturations in the 70s. She has a history of chronic COPD and uses 3 L of oxygen at baseline. [Dr Allen Pu 1/2: Got 2 units PRBCs, but follow up labs unable to be obtained for the past 12 hours. I spoke with anesthesia and they will access her for central line. Pt tolerated PO, +BM yesterday. Says her legs are no more swollen than her baseline. 1/3: Sugar was in 50s before bed last night. Held bedtime humalog and halved lantus. Sugars better this AM.  Now in Afib with RVR. Responded only briefly to dilt push. Pt feels the tachycardia. Denies increased SOB or CP. Tolerated PO yesterday. Due for bowel prep today, but on hold until cardiovascularly more stable. 1/4:  She reports she feels better this AM.  Currently in sinus rhythm at this moment. She is going to endoscopy this AM.  Hb up to 8.6. WBC 14.4. Will decrease Prednisone to 20mg per day. 1/5:  Continues to feel better but still very fatigued. Remains in sinus rhythm at this moment. Upper and lower endoscopies as under Data Review. Eliquis resumed. 1/6:  About the same with no specific complaints except \"feel weak all over. \"  Back in A Fib but rate ok in 90s to low 100s. Hb stable at 8.5. She is willing to go to rehab so will get Case Management to see. 1/7:  No specific complaints. Hb 8.3. Cards reported irreg rate more of PACs than A fib at this time. Repeat CXR today. Stable for rehab when bed found. Tubular adenomas on colon polyp path - follow up per GI. 1/8:  No specific complaints. Still very weak, cannot get up on her own and I do not think she can manage at assisted living without 24/7 care at this point. CXR yesterday without progression. She declined rehab yesterday but after extended discussion she would agree to go to [facility]. PT still recommending rehab. She is basically at baseline except her weakness. 1/9:  She reports she is feeling better. She wants to try to get up to chair today. Hb down from 9.0 to 7.2 this AM - recheck later today and transfuse if needed. 1/10:  Other than fatigue and weakness she reports no complaints. She sat up on edge of bed yesterday. Pulled IJ out last night and nurses report unable to get labs. Pt requests no labs unless absolutely necessary. Recheck of Hb yesterday PM was 8.9 so will hold off on labs for now. Stable for rehab.   
 
1/11:  Reports she feels back to usual this AM.  Another episode of A fib with RVR last night - Cards restarted Dig, gave IV bolus of Cardizem and she allowed labs yesterday and converted back to sinus. Hb down a bit at 7.7 yesterday. AM labs not drawn yet. 1/12:  Little change from yesterday she reports. Still c/o fatigue and weakness. Rate down to 80s. Remains afebrile. Repeat Covid negative. Hb yesterday AM 8.3. If this AM Hb is ok she could go to [facility] later today. Pt reports she feels ready to go to rehab.    
 
1/13:  She feels the rapid heart beat but no chest pain or shortness of breath. Recurrent Afib with RVR yesterday that was unable to be resolved on floor and transferred to step down unit. Mag and K+ ok. Rate in 130s to 150s this AM at the time of my visit. Cards is seeing her now. :  Back in sinus this AM.  Cards started Amiodarone yesterday and converted. Now on Dilt, Amio and dig. No complaints this AM except weakness. Watch here another day and if still in sinus tomorrow then hopefully to rehab. Cards recommends Amiodarone x 1 month and follow up in Feb.  
 
1/15:  No complaints except fatigue. Out of ICU yesterday. Remains in sinus rhythm. AM labs pending. Hopefully to SNF rehab later today. 330pm:  She will need labs rechecked at rehab. Cardiology has signed off. Accepted at rehab and stable to go. Follow up with PCP when out of rehab. Follow up with GI and Card as directed. She will need repeat CXR in several wk, likely when still at rehab. Review of Systems: A comprehensive review of systems was negative except for that written in the HPI. Objective:  
Physical Exam:  
Visit Vitals BP (!) 140/60 (BP 1 Location: Left arm, BP Patient Position: At rest) Pulse 85 Temp 98.1 °F (36.7 °C) Resp 18 Ht 5' 7\" (1.702 m) Wt 113.4 kg (250 lb) SpO2 97% BMI 39.16 kg/m² O2 Flow Rate (L/min): 3 l/min O2 Device: Nasal cannula Temp (24hrs), Av.1 °F (36.7 °C), Min:97.5 °F (36.4 °C), Max:98.6 °F (37 °C) No intake/output data recorded.  1901 - 01/15 0700 In: 390 [P.O.:390] Out: 650 [Urine:650] General:  Alert, cooperative, no distress, appears stated age, moon-face. Head:  Normocephalic, without obvious abnormality, atraumatic. Eyes:  Conjunctivae/corneas clear. EOMs intact. Throat: Lips, mucosa, and tongue moist.  
Neck: Supple, symmetrical, trachea midline, no adenopathy, thyroid: no enlargement/tenderness/nodules, no carotid bruit and no JVD. Lungs:    diminished at bases, a few scattered rhonchi Chest wall:  No tenderness or deformity. Heart:   reg with rate in 80s at this time - monitor shows sinus. no murmur, click, rub or gallop. Abdomen:   Soft, non-tender. Bowel sounds normal. No masses,  No organomegaly. 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 Arms/hands much less swollen. No calf tenderness or cords. Pulses: 2+ and symmetric all extremities. Skin: Skin color, texture, turgor normal. No rashes Neurologic:   Alert and oriented X 3.  equal.  Gait not tested at this time. Sensation grossly normal to touch. Gross motor of extremities normal.    
 
Data Review:  
  Esophagogastroduodenoscopy (EGD) Colonoscopy Procedure Note Vita Lam : 1945 [address] [place] [address] Record Number: [id number] 
  
Indications:    Iron deficiency anemia, diarrhea Referring Physician:  Charanjit Cates MD 
Anesthesia/Sedation: see nursing notes Endoscopist:  Dr. Alvarado Hidden Assistants: None Procedure in Detail: After obtaining informed consent, positioning of the patient in the left lateral decubitus position, and conduction of a pre-procedure pause or \"time out\" the endoscope was introduced into the mouth and advanced to the duodenum. A careful inspection was made, and findings or interventions are described below. Findings:  
Esophagus:numerous white exudates Stomach: antral erythema with soft submucosal mass Normal body and fundus Duodenum/jejunum: erythema, erosions second into third portion Complications/estimated blood loss: none Therapies:  biopsy of stomach antrum at site submucosal mass 
biopsy of duodenal second portion, third portion Specimens: biopsies Implants:none Endoscopist:  Dr. Alvarado Hidden Assistants: None Complications:  None Estimate Blood Loss:  None 
  
Colonoscopy Procedure in Detail: After obtaining informed consent, positioning of the patient in the left lateral decubitus position, and conduction of a pre-procedure pause or \"time out\" the endoscope was introduced into the anus and advanced to the terminal ileum. The quality of the colonic preparation was adequate. A careful inspection was made as the colonoscope was withdrawn, findings and interventions are described below. Appendiceal orifice photographed Findings: - Diverticulosis Two sessile 12 mm polyps Specimens:  
 polyps removed cold snare; random biopsies cecum, ascending Implants: none Complications:  
None; patient tolerated the procedure well. Estimated blood loss: none Impression: 
Candida esophagitis, gastritis, duodenitis, colon polyps, diverticulosis. Antral mass likely a benign lipoma Recommendations: - Await pathology. - anticoagulate tomorrow Resume diet; add creon, nystatin suspension Thank you for entrusting me with this patient's care. Please do not hesitate to contact me with any questions or if I can be of assistance with any of your other patients' GI needs. Signed By: Junior Hollins MD 
                      January 4, 2021 Port CXR 1/7/21 IMPRESSION: Unchanged left basilar airspace disease and small left pleural 
Effusion Recent Days: 
Recent Labs  
  01/12/21 
2315 WBC 13.0* HGB 7.8* HCT 26.8*  
 Recent Labs  
  01/12/21 
2315   
K 4.3  CO2 30 * BUN 24* CREA 1.04* CA 8.1*  
MG 2.1 No results for input(s): PH, PCO2, PO2, HCO3, FIO2 in the last 72 hours. 24 Hour Results: 
Recent Results (from the past 24 hour(s)) GLUCOSE, POC Collection Time: 01/14/21  8:02 AM  
Result Value Ref Range Glucose (POC) 109 (H) 65 - 100 mg/dL Performed by Tiffany Laird, POC Collection Time: 01/14/21 11:31 AM  
Result Value Ref Range Glucose (POC) 136 (H) 65 - 100 mg/dL  Performed by Tiffany Laird, POC  
 Collection Time: 01/14/21  4:39 PM  
Result Value Ref Range Glucose (POC) 279 (H) 65 - 100 mg/dL Performed by Larina Hammans (PCT) GLUCOSE, POC Collection Time: 01/14/21 10:02 PM  
Result Value Ref Range Glucose (POC) 234 (H) 65 - 100 mg/dL Performed by Priti Terry GLUCOSE, POC Collection Time: 01/15/21  6:58 AM  
Result Value Ref Range Glucose (POC) 93 65 - 100 mg/dL Performed by Priti Terry Medications reviewed Current Facility-Administered Medications Medication Dose Route Frequency  albuterol-ipratropium (DUO-NEB) 2.5 MG-0.5 MG/3 ML  3 mL Nebulization Q6H RT  
 budesonide (PULMICORT) 500 mcg/2 ml nebulizer suspension  500 mcg Nebulization BID RT  
 amiodarone (CORDARONE) tablet 400 mg  400 mg Oral BID  [START ON 1/21/2021] amiodarone (CORDARONE) tablet 200 mg  200 mg Oral DAILY  digoxin (LANOXIN) tablet 0.125 mg  0.125 mg Oral DAILY  insulin glargine (LANTUS) injection 18 Units  18 Units SubCUTAneous QHS  dilTIAZem ER (CARDIZEM CD) capsule 240 mg  240 mg Oral DAILY  ferrous sulfate tablet 325 mg  1 Tab Oral DAILY WITH BREAKFAST  loperamide (IMODIUM) capsule 2 mg  2 mg Oral BID  lipase-protease-amylase (CREON 24,000) capsule 1 Cap  1 Cap Oral TID WITH MEALS  
 apixaban (ELIQUIS) tablet 5 mg  5 mg Oral BID  predniSONE (DELTASONE) tablet 20 mg  20 mg Oral DAILY WITH BREAKFAST  phosphorus (K PHOS NEUTRAL) 250 mg tablet 1 Tab  1 Tab Oral BID  pantoprazole (PROTONIX) tablet 40 mg  40 mg Oral ACB  
 0.9% sodium chloride infusion 250 mL  250 mL IntraVENous PRN  
 0.9% sodium chloride infusion 250 mL  250 mL IntraVENous PRN  
 sodium chloride (NS) flush 5-10 mL  5-10 mL IntraVENous PRN  
 sodium chloride (NS) flush 5-40 mL  5-40 mL IntraVENous Q8H  
 sodium chloride (NS) flush 5-40 mL  5-40 mL IntraVENous PRN  
 acetaminophen (TYLENOL) tablet 650 mg  650 mg Oral Q6H PRN  Or  
  acetaminophen (TYLENOL) suppository 650 mg  650 mg Rectal Q6H PRN  polyethylene glycol (MIRALAX) packet 17 g  17 g Oral DAILY PRN  promethazine (PHENERGAN) tablet 12.5 mg  12.5 mg Oral Q6H PRN Or  
 ondansetron (ZOFRAN) injection 4 mg  4 mg IntraVENous Q6H PRN  
 0.9% sodium chloride infusion 250 mL  250 mL IntraVENous PRN  
 glucose chewable tablet 16 g  4 Tab Oral PRN  
 dextrose (D50W) injection syrg 12.5-25 g  25-50 mL IntraVENous PRN  
 glucagon (GLUCAGEN) injection 1 mg  1 mg IntraMUSCular PRN  
 insulin lispro (HUMALOG) injection   SubCUTAneous QID WITH MEALS  cholecalciferol (VITAMIN D3) (1000 Units /25 mcg) tablet 2 Tab  2,000 Units Oral DAILY  cyanocobalamin (VITAMIN B12) tablet 1,000 mcg  1,000 mcg Oral DAILY  DULoxetine (CYMBALTA) capsule 60 mg  60 mg Oral DAILY  mirtazapine (REMERON) tablet 15 mg  15 mg Oral QHS  montelukast (SINGULAIR) tablet 10 mg  10 mg Oral QPM  
 
Care Plan discussed with: Patient, GI and Nurse Total time spent with patient: 30 minutes.  
Jeremi Willis MD

## 2021-01-21 NOTE — ED PROVIDER NOTES
82-year-old female with history of asthma, A. fib, fibromyalgia, coronary disease, COPD, diabetes, heart failure, hypertension, obesity presents to the emergency department today for low blood counts. She had her blood drawn at her assisted living facility this morning was told that her hemoglobin is low and that she should report to the emergency department for further management. She complains of some fatigue but otherwise no chest pain or shortness of breath. No cough no fevers. No nausea or vomiting. She has had blood transfusions in the past due to low blood counts. The history is provided by the patient and medical records. Abnormal Lab Results This is a new problem. Episode onset: This morning. The problem occurs constantly. The problem has not changed since onset. Pertinent negatives include no chest pain, no abdominal pain, no headaches and no shortness of breath. Past Medical History:  
Diagnosis Date  Asthma  Atrial fibrillation ([address] 11/16/2018  Autoimmune disease ([address]   
 fibromyalgia  CAD (coronary artery disease) 10/9/2015  Closed wedge compression fracture of T7 vertebra ([address] 11/13/2018  COPD (chronic obstructive pulmonary disease) (HCC)  Diabetes ([address]  DVT (deep vein thrombosis) in pregnancy  GERD (gastroesophageal reflux disease)  Heart failure ([address]  History of vascular access device 09/30/2020  
 4f midline, 12cm at 1cm out placed in L Cephalic by Tan Sherwood RN  
 HTN (hypertension)  NSTEMI (non-ST elevated myocardial infarction) ([address] 10/9/2015  Obesity (BMI 30-39.9) 11/13/2018  PAD (peripheral artery disease) ([address] prior stenting  Sleep apnea   
 wears CPAP  
 Statin intolerance Past Surgical History:  
Procedure Laterality Date  COLONOSCOPY N/A 1/4/2021 COLONOSCOPY performed by Jazmyn Canada MD at [address] [place] [address] 64  HX COLOSTOMY    
 and reversal, post episiotomy repair  HX HYSTERECTOMY  1970  HX UROLOGICAL  2009  
 bladder sling  IR KYPHOPLASTY LUMBAR  10/8/2020  [place] ABDOMEN SURGERY PROC UNLISTED    
 hital hernia repair, gall bladder removed  [place] AMPUTATION TRANSMETACARPAL Right 06/12/2019  
 entire ring finger, 2nd & 3rd fingers (transmetacarpal)  [place] BREAST SURGERY PROCEDURE UNLISTED    
 lumpectomy  [place] CARDIAC SURG PROCEDURE UNLIST  10/9/15  
 2 stents Family History:  
Problem Relation Age of Onset  Diabetes Mother  Heart Failure Mother  Kidney Disease Mother  Diabetes Father  Heart Disease Father  Elevated Lipids Father  Heart Failure Father  Heart Disease Brother  Cancer Brother   
     kidney  Diabetes Brother  No Known Problems Brother  No Known Problems Brother Social History Socioeconomic History  Marital status:  Spouse name: Not on file  Number of children: Not on file  Years of education: Not on file  Highest education level: Not on file Occupational History  Not on file Social Needs  Financial resource strain: Not on file  Food insecurity Worry: Not on file Inability: Not on file  Transportation needs Medical: Not on file Non-medical: Not on file Tobacco Use  Smoking status: Former Smoker Quit date: 3/30/2017 Years since quitting: 3.8  Smokeless tobacco: Never Used Substance and Sexual Activity  Alcohol use: No  
  Alcohol/week: 0.0 standard drinks Comment: very very rarely  Drug use: No  
 Sexual activity: Never Lifestyle  Physical activity Days per week: Not on file Minutes per session: Not on file  Stress: Not on file Relationships  Social connections Talks on phone: Not on file Gets together: Not on file Attends Yarsanism service: Not on file Active member of club or organization: Not on file Attends meetings of clubs or organizations: Not on file   Relationship status: Not on file  Intimate partner violence Fear of current or ex partner: Not on file Emotionally abused: Not on file Physically abused: Not on file Forced sexual activity: Not on file Other Topics Concern [address] Service Not Asked  Blood Transfusions Not Asked  Caffeine Concern Not Asked  Occupational Exposure Not Asked [place] Hazards Not Asked  Sleep Concern Not Asked  Stress Concern Not Asked  Weight Concern Not Asked  Special Diet Not Asked  Back Care Not Asked  Exercise Not Asked  Bike Helmet Not Asked  Seat Belt Not Asked  Self-Exams Not Asked Social History Narrative  Not on file ALLERGIES: Advair diskus [fluticasone propion-salmeterol], Aspartame, Breo ellipta [fluticasone furoate-vilanterol], Bumex [bumetanide], Ciprofloxacin, Statins-hmg-coa reductase inhibitors, Sulfa (sulfonamide antibiotics), Tetracycline, Torsemide, and Zetia [ezetimibe] Review of Systems Constitutional: Positive for fatigue. Negative for fever. HENT: Negative for sneezing and sore throat. Respiratory: Negative for cough and shortness of breath. Cardiovascular: Negative for chest pain and leg swelling. Gastrointestinal: Negative for abdominal pain, diarrhea, nausea and vomiting. Genitourinary: Negative for difficulty urinating and dysuria. Musculoskeletal: Negative for arthralgias and myalgias. Skin: Negative for color change and rash. Neurological: Negative for weakness and headaches. Psychiatric/Behavioral: Negative for agitation and behavioral problems. There were no vitals filed for this visit. Physical Exam 
Vitals signs and nursing note reviewed. Constitutional:   
   General: She is not in acute distress. Appearance: Normal appearance. She is obese. She is not ill-appearing, toxic-appearing or diaphoretic. HENT:  
   Head: Normocephalic and atraumatic. Nose: Nose normal.  
   Mouth/Throat: Mouth: Mucous membranes are moist.  
   Pharynx: Oropharynx is clear. Eyes:  
   Extraocular Movements: Extraocular movements intact. Conjunctiva/sclera: Conjunctivae normal.  
   Pupils: Pupils are equal, round, and reactive to light. Neck: Musculoskeletal: Normal range of motion and neck supple. Cardiovascular:  
   Rate and Rhythm: Normal rate and regular rhythm. Pulses: Normal pulses. Heart sounds: Normal heart sounds. Pulmonary:  
   Effort: Pulmonary effort is normal.  
   Breath sounds: Normal breath sounds. Abdominal:  
   General: There is no distension. Palpations: Abdomen is soft. Tenderness: There is no abdominal tenderness. There is no guarding or rebound. Musculoskeletal: Normal range of motion. General: No swelling, tenderness, deformity or signs of injury. Right lower leg: Edema present. Left lower leg: Edema present. Skin: 
   General: Skin is warm and dry. Capillary Refill: Capillary refill takes less than 2 seconds. Neurological:  
   General: No focal deficit present. Mental Status: She is alert and oriented to person, place, and time. Psychiatric:     
   Mood and Affect: Mood normal.     
   Behavior: Behavior normal.  
 
  
 
MDM Number of Diagnoses or Management Options Diagnosis management comments: 81-year-old female presents as above with low H&H. She has a history of low H&H has had transfusions in the past.  She has no of anemia such as chest pain or shortness of breath. Her blood work is otherwise reassuring. Plan for transfusion in the ED and discharge home with instructions for to follow-up with her primary care doctor soon as possible. I suspect the etiology of her anemia is likely CKD. Amount and/or Complexity of Data Reviewed Clinical lab tests: reviewed Decide to obtain previous medical records or to obtain history from someone other than the patient: yes Procedures Procedure Note for Ultrasound Guided IV. IV access was not able to be obtained by nursing staff due to the patient having very difficult vein access. Skin was cleaned and disinfected prior to IV puncture. Ultrasound was used to find the vein which was compressible and does not have any ultrasound features of an artery. Under real-time ultrasound guidance peripheral access was obtained in the Left upper extremity. Blood return was present and IV flushed without difficulty with no clinical signs of infiltration. IV was taped into position and there were no immediate complications noted and patient tolerated the procedure well. Procedure was completed by myself the attending physician.

## 2021-01-21 NOTE — ED TRIAGE NOTES
Patient arrives via EMS from [facility] for abnormal results ( hemoglobin) Patient doesn't have any complaints other than nausea Baseline 3L nasal cannula

## 2021-01-26 PROBLEM — D64.9 ANEMIA: Status: ACTIVE | Noted: 2021-01-01

## 2021-01-26 PROBLEM — E11.65 UNCONTROLLED TYPE 2 DIABETES MELLITUS WITH HYPERGLYCEMIA (HCC): Status: ACTIVE | Noted: 2021-01-01

## 2021-01-26 PROBLEM — I50.23 ACUTE ON CHRONIC SYSTOLIC CHF (CONGESTIVE HEART FAILURE) (HCC): Status: ACTIVE | Noted: 2021-01-01

## 2021-01-26 PROBLEM — R06.02 SOB (SHORTNESS OF BREATH): Status: ACTIVE | Noted: 2021-01-01

## 2021-01-26 PROBLEM — J18.9 PNEUMONIA: Status: ACTIVE | Noted: 2021-01-01

## 2021-01-26 NOTE — ED NOTES
Ultrasound IV by ED Staff Procedure Note Patient meets criteria for US IV insertion. Ultrasound IV verbal education provided to patient. Opportunities for questions given. IV ultrasound technique used for PIV placement: 
20 gauge BD Nexiva 20 [facility] location. 1 X Attempt(s). Procedure tolerated well. Primary RN aware of IV placement and added to LDA.   
 
                           Amish Proctor, MADAI

## 2021-01-26 NOTE — ED PROVIDER NOTES
Patient is a 68-year-old female with history of asthma, atrial fibrillation, fibromyalgia, coronary artery disease, COPD (3L NC), diabetes, CHF, NSTEMI who presents with shortness of breath. Patient reports that she has a history of anemia, but her work-up was negative the last time she was admitted to the hospital.  Patient reports that she was given a unit of blood prior to discharge home. Today was seen by the NP at [facility] who recommended ED visit since her hemoglobin was 6.6. The NP thought that she is short of breath because of her anemia. Denies any new abnormal bleeding. Reports no suspicion for Covid today. Past Medical History:  
Diagnosis Date  Asthma  Atrial fibrillation ([address] 11/16/2018  Autoimmune disease ([address]   
 fibromyalgia  CAD (coronary artery disease) 10/9/2015  Closed wedge compression fracture of T7 vertebra ([address] 11/13/2018  COPD (chronic obstructive pulmonary disease) (HCC)  Diabetes ([address]  DVT (deep vein thrombosis) in pregnancy  GERD (gastroesophageal reflux disease)  Heart failure ([address]  History of vascular access device 09/30/2020  
 4f midline, 12cm at 1cm out placed in L Cephalic by Stefanie Colbert RN  
 HTN (hypertension)  NSTEMI (non-ST elevated myocardial infarction) ([address] 10/9/2015  Obesity (BMI 30-39.9) 11/13/2018  PAD (peripheral artery disease) ([address] prior stenting  Sleep apnea   
 wears CPAP  
 Statin intolerance Past Surgical History:  
Procedure Laterality Date  COLONOSCOPY N/A 1/4/2021 COLONOSCOPY performed by Jenny Strtaton MD at [address]  HX COLOSTOMY    
 and reversal, post episiotomy repair [address]  HX UROLOGICAL  2009  
 bladder sling  IR KYPHOPLASTY LUMBAR  10/8/2020  [place] ABDOMEN SURGERY PROC UNLISTED    
 hital hernia repair, gall bladder removed  [place] AMPUTATION TRANSMETACARPAL Right 06/12/2019  
 entire ring finger, 2nd & 3rd fingers (transmetacarpal)  [place] BREAST SURGERY PROCEDURE UNLISTED    
 lumpectomy  [place] CARDIAC SURG PROCEDURE UNLIST  10/9/15  
 2 stents Family History:  
Problem Relation Age of Onset  Diabetes Mother  Heart Failure Mother  Kidney Disease Mother  Diabetes Father  Heart Disease Father  Elevated Lipids Father  Heart Failure Father  Heart Disease Brother  Cancer Brother   
     kidney  Diabetes Brother  No Known Problems Brother  No Known Problems Brother Social History Socioeconomic History  Marital status:  Spouse name: Not on file  Number of children: Not on file  Years of education: Not on file  Highest education level: Not on file Occupational History  Not on file Social Needs  Financial resource strain: Not on file  Food insecurity Worry: Not on file Inability: Not on file  Transportation needs Medical: Not on file Non-medical: Not on file Tobacco Use  Smoking status: Former Smoker Quit date: 3/30/2017 Years since quitting: 3.8  Smokeless tobacco: Never Used Substance and Sexual Activity  Alcohol use: No  
  Alcohol/week: 0.0 standard drinks Comment: very very rarely  Drug use: No  
 Sexual activity: Never Lifestyle  Physical activity Days per week: Not on file Minutes per session: Not on file  Stress: Not on file Relationships  Social connections Talks on phone: Not on file Gets together: Not on file Attends Faith service: Not on file Active member of club or organization: Not on file Attends meetings of clubs or organizations: Not on file Relationship status: Not on file  Intimate partner violence Fear of current or ex partner: Not on file Emotionally abused: Not on file Physically abused: Not on file Forced sexual activity: Not on file Other Topics Concern [address] Service Not Asked  Blood Transfusions Not Asked  Caffeine Concern Not Asked  Occupational Exposure Not Asked [place] Hazards Not Asked  Sleep Concern Not Asked  Stress Concern Not Asked  Weight Concern Not Asked  Special Diet Not Asked  Back Care Not Asked  Exercise Not Asked  Bike Helmet Not Asked  Seat Belt Not Asked  Self-Exams Not Asked Social History Narrative  Not on file ALLERGIES: Advair diskus [fluticasone propion-salmeterol], Aspartame, Breo ellipta [fluticasone furoate-vilanterol], Bumex [bumetanide], Ciprofloxacin, Statins-hmg-coa reductase inhibitors, Sulfa (sulfonamide antibiotics), Tetracycline, Torsemide, and Zetia [ezetimibe] Review of Systems Constitutional: Negative for chills and fever. HENT: Negative for drooling and nosebleeds. Eyes: Negative for pain and itching. Respiratory: Positive for shortness of breath. Negative for choking and stridor. Cardiovascular: Negative for leg swelling. Gastrointestinal: Negative for abdominal distention and rectal pain. Endocrine: Negative for heat intolerance and polyphagia. Genitourinary: Negative for enuresis and genital sores. Musculoskeletal: Negative for arthralgias and joint swelling. Skin: Negative for color change. Allergic/Immunologic: Negative for immunocompromised state. Neurological: Negative for tremors and speech difficulty. Hematological: Negative for adenopathy. Psychiatric/Behavioral: Negative for dysphoric mood and sleep disturbance. Vitals:  
 01/26/21 1437 01/26/21 1502 01/26/21 1630 BP: (!) 139/47 (!) 128/37 (!) 118/34 Pulse: 82 Resp: 22 22 Temp: 97.6 °F (36.4 °C) SpO2: 94% 97% Physical Exam 
Vitals signs and nursing note reviewed. Constitutional:   
   General: She is not in acute distress. Appearance: She is well-developed. She is ill-appearing. She is not toxic-appearing or diaphoretic. HENT:  
   Head: Normocephalic. Nose: Nose normal.  
   Mouth/Throat:  
   Mouth: Mucous membranes are moist.  
Eyes:  
   Conjunctiva/sclera: Conjunctivae normal.  
Neck: Musculoskeletal: Normal range of motion and neck supple. Cardiovascular:  
   Rate and Rhythm: Normal rate and regular rhythm. Heart sounds: Normal heart sounds. Pulmonary:  
   Effort: No respiratory distress. Comments: Dec BS in bases. Mild increased WOB. Abdominal:  
   General: There is no distension. Palpations: Abdomen is soft. Tenderness: There is no abdominal tenderness. Musculoskeletal: Normal range of motion. General: No deformity. Skin: 
   General: Skin is warm and dry. Neurological:  
   Mental Status: She is alert. Coordination: Coordination normal.  
Psychiatric:     
   Behavior: Behavior normal.  
 
  
 
MDM Number of Diagnoses or Management Options Diagnosis management comments: PNA on CXR. Covid neg in ED. Increased SOB with CHF exacerbation noted - IV lasix given. No concern for unstable airway during ED stay. Will admit for further management. Amount and/or Complexity of Data Reviewed Decide to obtain previous medical records or to obtain history from someone other than the patient: yes Procedures Perfect Serve Consult for Admission [address] [facility] 
 
ED Room Number: ER09/09 Patient Name and age:  Dev Yeager 70 y.o.  female Working Diagnosis: 1. Pneumonia of left lower lobe due to infectious organism 2. SOB (shortness of breath) 3. Acute on chronic systolic congestive heart failure ([address] COVID-19 Suspicion:  yes Sepsis present:  no  Reassessment needed: no 
Code Status:  Full Code Readmission: no 
Isolation Requirements:  yes Recommended Level of Care:  telemetry Department:[facility] ED - [phone] Other:  SOB, with hx of COPD and CHF. PNA on CXR, IV ceft and azith given. COVID swab negative. Lasix for elevated BNP.   
 
Patient is being admitted to the hospital.  The results of their tests and reasons for their admission have been discussed with them and/or available family. They convey agreement and understanding for the need to be admitted and for their admission diagnosis.

## 2021-01-26 NOTE — H&P
History & Physical 
 
Date of admission: 1/26/2021 Patient name: Mathew Burns MRN: [id number] YOB: 1945 Age: 70 y.o. Primary care provider:  Gabby Schultz MD  
 
Source of Information: patient, ED and electronic medical records Chief complaint:  Shortness of breath and low hemoglobin History of present illness Mathew Burns is a 70 y.o. female with past medical history of chronic hypoxic respiratory failure (on 3 liters home oxygen), asthma, atrial fibrillation (Afib), autoimmune disease, fibromyalgia, CAD, heart failure (HFpEF), T7 vertebral compression fracture, COPD, type 2 DM, DVT, GERD, hypertension, NSTEMI, PAD, sleep apnea, obesity presented to the ED from home with chief complaints of shortness of breath (SOB) and low hemoglobin. Patient has chronic SOB but worsened. She was last hospitalized here from 1/1/2021 - 1/15/2021 with left lower lobe pneumonia, acute on chronic hypoxic respiratory failure, sepsis, GI bleed, anemia. Patient was transfused with 2 units PRBCs with hemoglobin = 4.9 on 1/1/2021. Last Hgb= 6.5 on 1/21/2021. On arrival in the ED today, initial recorded vital signs were BP = 139/47, HR= 82, RR= 22, O2sat= 94% on 4 liters O2 nasal cannula (NC). WBC = 18,400. proBNP = 1,443. Chest xray portable showed cardiomegaly, interstitial edema, and density in left lung base. Patient is now seen for admission to the hospitalist service for continued evaluation and treatments. There were no reports of new onset syncope, loss of consciousness, headache, neck pain, visual disturbance, numbness, paresthesias, focal weakness, chest pain, palpitations, abdominal pain, nausea, vomiting, diarrhea, melena, dysuria, hematuria, calf pain, increased leg swelling/ edema, fever, chills, rash, or known COVID 19 exposure.  
 
Past Medical History:  
Diagnosis Date  
 Asthma  Atrial fibrillation ([facility] 11/16/2018  Autoimmune disease ([facility]   
 fibromyalgia  CAD (coronary artery disease) 10/9/2015  Closed wedge compression fracture of T7 vertebra ([facility] 11/13/2018  COPD (chronic obstructive pulmonary disease) ([facility])  Diabetes ([facility]  DVT (deep vein thrombosis) in pregnancy  GERD (gastroesophageal reflux disease)  Heart failure ([facility]  History of vascular access device 09/30/2020  
 4f midline, 12cm at 1cm out placed in L Cephalic by Soniya Vidal RN  
 HTN (hypertension)  NSTEMI (non-ST elevated myocardial infarction) ([facility] 10/9/2015  Obesity (BMI 30-39.9) 11/13/2018  PAD (peripheral artery disease) ([facility] prior stenting  Sleep apnea   
 wears CPAP  
 Statin intolerance Past Surgical History:  
Procedure Laterality Date  COLONOSCOPY N/A 1/4/2021 COLONOSCOPY performed by Yina Elmore MD at [address]  HX COLOSTOMY    
 and reversal, post episiotomy repair [address] [facility]  HX UROLOGICAL  2009  
 bladder sling  IR KYPHOPLASTY LUMBAR  10/8/2020  [place] ABDOMEN SURGERY PROC UNLISTED    
 hital hernia repair, gall bladder removed  [place] AMPUTATION TRANSMETACARPAL Right 06/12/2019  
 entire ring finger, 2nd & 3rd fingers (transmetacarpal)  OR BREAST SURGERY PROCEDURE UNLISTED    
 lumpectomy  [place] CARDIAC SURG PROCEDURE UNLIST  10/9/15  
 2 stents Prior to Admission medications Medication Sig Start Date End Date Taking? Authorizing Provider  
predniSONE (DELTASONE) 20 mg tablet Take 20 mg by mouth daily (with breakfast). 1/16/21   Jesús Hill MD  
amiodarone (PACERONE) 400 mg tablet Take 1 Tab by mouth two (2) times a day. 1/15/21   Jesús Hill MD  
dilTIAZem ER (CARDIZEM CD) 360 mg capsule Take 1 Cap by mouth daily.  1/16/21   Jesús Hill MD  
lipase-protease-amylase (CREON 24,000) 24,000-76,000 -120,000 unit capsule Take 1 Cap by mouth three (3) times daily (with meals). 1/15/21   Sudhir Taveras MD  
pantoprazole (PROTONIX) 40 mg tablet Take 1 Tab by mouth Daily (before breakfast). 1/16/21   Sudhir Taveras MD  
phosphorus (K PHOS NEUTRAL) 250 mg tablet Take 1 Tab by mouth two (2) times a day. 1/15/21   Sudhir Taveras MD  
aspirin 81 mg chewable tablet Take 81 mg by mouth daily. Provider, Historical  
apixaban (ELIQUIS) 5 mg tablet Take 5 mg by mouth two (2) times a day. Provider, Historical  
alendronate (FOSAMAX) 70 mg tablet Take 70 mg by mouth every thirty (30) days. On the 1st day of each month    Provider, Historical  
zinc oxide 20 % ointment Apply  to affected area three (3) times daily. Bilateral gluteal and perianal area each shift    Provider, Historical  
loperamide (IMODIUM) 2 mg capsule Take 4 mg by mouth two (2) times a day. Provider, Historical  
honey (MediHoney, honey,) 100 % topical paste Apply  to affected area daily. Provider, Historical  
cyanocobalamin 1,000 mcg tablet Take 1 Tab by mouth daily. 10/13/20   Sudhir Taveras MD  
digoxin (LANOXIN) 0.125 mg tablet Take 1 Tab by mouth daily. 10/13/20   Sudhir Taveras MD  
ferrous sulfate 325 mg (65 mg iron) tablet Take 1 Tab by mouth daily (with breakfast). 10/13/20   Sudhir Taveras MD  
tiotropium bromide (Spiriva Respimat) 2.5 mcg/actuation inhaler Take 2 Puffs by inhalation daily. Provider, Historical  
montelukast (Singulair) 10 mg tablet Take 10 mg by mouth every evening. Provider, Historical  
sAXagliptin (ONGLYZA) 5 mg tab tablet Take 5 mg by mouth Daily (before dinner). Provider, Historical  
acetaminophen (TYLENOL) 325 mg tablet Take 1 Tab by mouth every four (4) hours as needed for Pain. 3/31/20   Sudhir Taveras MD  
magnesium oxide (MAG-OX) 400 mg tablet Take 400 mg by mouth nightly. Provider, Historical  
potassium chloride SR (KLOR-CON 10) 10 mEq tablet Take 10 mEq by mouth two (2) times a day.     Provider, Historical  
mirtazapine (REMERON) 15 mg tablet Take 15 mg by mouth nightly. Provider, Historical  
albuterol (PROVENTIL VENTOLIN) 2.5 mg /3 mL (0.083 %) nebulizer solution 2.5 mg by Nebulization route every six (6) hours as needed for Wheezing or Shortness of Breath. Provider, Historical  
albuterol (PROAIR HFA) 90 mcg/actuation inhaler Take 2 Puffs by inhalation every four (4) hours as needed. Provider, Historical  
lactobacillus rhamnosus gg 10 billion cell (CULTURELLE) 10 billion cell capsule Take 1 Cap by mouth daily. Provider, Historical  
biotin 10,000 mcg cap Take 1 Cap by mouth daily. Provider, Historical  
DULoxetine (CYMBALTA) 60 mg capsule Take 60 mg by mouth daily. Provider, Historical  
cholecalciferol, vitamin D3, (Vitamin D3) 50 mcg (2,000 unit) tab Take 2,000 Units by mouth daily. Provider, Historical  
azelastine-fluticasone (DYMISTA) 137-50 mcg/spray spry 2 Sprays by Nasal route daily as needed (allergies). Provider, Historical  
mometasone-formoterol (DULERA) 200-5 mcg/actuation HFA inhaler Take 2 Puffs by inhalation two (2) times daily as needed. Provider, Historical  
 
Allergies Allergen Reactions  Advair Diskus [Fluticasone Propion-Salmeterol] Rash  Aspartame Other (comments)  Breo Ellipta [Fluticasone Furoate-Vilanterol] Rash  Bumex [Bumetanide] Myalgia  Ciprofloxacin Rash  Statins-Hmg-Coa Reductase Inhibitors Other (comments) Muscle pain Lipitor/crestor/zocor  Sulfa (Sulfonamide Antibiotics) Rash  Tetracycline Other (comments) musclepain  Torsemide Rash and Myalgia  Zetia [Ezetimibe] Myalgia Family History Problem Relation Age of Onset  Diabetes Mother  Heart Failure Mother  Kidney Disease Mother  Diabetes Father  Heart Disease Father  Elevated Lipids Father  Heart Failure Father  Heart Disease Brother  Cancer Brother   
     kidney  Diabetes Brother  No Known Problems Brother  No Known Problems Brother Social history Patient resides 
   
   
x  Assisted living Alcohol history  
x  None Smoking history 
   
x  Former smoker Social History Tobacco Use Smoking Status Former Smoker  Quit date: 3/30/2017  Years since quitting: 3.8 Smokeless Tobacco Never Used Code status 
x  Full code Code status discussed with the patient. Review of systems Pertinent positives as noted in HPI. All other systems were reviewed and were negative. Physical Examination Visit Vitals BP (!) 118/34 Pulse 69 Temp 97.6 °F (36.4 °C) Resp 22 SpO2 100% O2 Flow Rate (L/min): 3 l/min O2 Device: Nasal cannula General:  Obese female in no acute respiratory distress Head:  Normocephalic, without obvious abnormality, atraumatic Eyes:  Conjunctivae/corneas clear. PERRL, EOMs intact E/N/M/T: Nares normal. Septum midline. No nasal drainage or sinus tenderness Tongue midline/ non-edematous Clear oropharynx Neck: Normal appearance and movements, symmetrical, trachea midline No palpable adenopathy No thyroid enlargement, tenderness or nodules No carotid bruit No JVD Lungs:   Symmetrical chest expansion and respiratory effort Rales to auscultation bilaterally Chest wall:  No tenderness or deformity Heart:  Regular rate and rhythm Sounds normal; no murmur, click, rub or gallop Abdomen:   Soft, no tenderness No rebound, guarding, or rigidity Non-distended Bowel sounds normal 
No masses or hepatosplenomegaly No hernias present Back: No CVA tenderness Extremities: No acute bony abnormality No cyanosis Marked non-pitting BUE/ BLE edema Pulses 2+ radial/ 1+ DP bilateral pulses Skin: Diffuse ecchymosis of BUE with skin tears Warm/ dry Musculo- 
    skeletal: Gait not tested Neuro: GCS 15. Moves all extremities x 4 with generalized weakness. No slurred speech. No facial droop. Sensation grossly intact. Psych: Alert, oriented x3 Data Review 24 Hour Results: 
Recent Results (from the past 24 hour(s)) POC LACTIC ACID Collection Time: 01/26/21  3:04 PM  
Result Value Ref Range Lactic Acid (POC) 1.07 0.40 - 2.00 mmol/L  
SAMPLES BEING HELD Collection Time: 01/26/21  3:13 PM  
Result Value Ref Range SAMPLES BEING HELD 1PST,1SST,1RED,1LAV,1BLUE. BLDCS   
 COMMENT Add-on orders for these samples will be processed based on acceptable specimen integrity and analyte stability, which may vary by analyte. CBC WITH AUTOMATED DIFF Collection Time: 01/26/21  3:13 PM  
Result Value Ref Range WBC 18.4 (H) 3.6 - 11.0 K/uL  
 RBC 2.56 (L) 3.80 - 5.20 M/uL HGB 7.2 (L) 11.5 - 16.0 g/dL HCT 25.5 (L) 35.0 - 47.0 % MCV 99.6 (H) 80.0 - 99.0 FL  
 MCH 28.1 26.0 - 34.0 PG  
 MCHC 28.2 (L) 30.0 - 36.5 g/dL  
 RDW 19.7 (H) 11.5 - 14.5 % PLATELET 989 123 - 376 K/uL MPV 10.3 8.9 - 12.9 FL  
 NRBC 0.4 (H) 0  WBC ABSOLUTE NRBC 0.08 (H) 0.00 - 0.01 K/uL NEUTROPHILS 90 (H) 32 - 75 % LYMPHOCYTES 3 (L) 12 - 49 % MONOCYTES 4 (L) 5 - 13 % EOSINOPHILS 1 0 - 7 % BASOPHILS 0 0 - 1 % IMMATURE GRANULOCYTES 2 (H) 0.0 - 0.5 % ABS. NEUTROPHILS 16.5 (H) 1.8 - 8.0 K/UL  
 ABS. LYMPHOCYTES 0.6 (L) 0.8 - 3.5 K/UL  
 ABS. MONOCYTES 0.7 0.0 - 1.0 K/UL  
 ABS. EOSINOPHILS 0.2 0.0 - 0.4 K/UL  
 ABS. BASOPHILS 0.0 0.0 - 0.1 K/UL  
 ABS. IMM. GRANS. 0.4 (H) 0.00 - 0.04 K/UL  
 DF AUTOMATED    
 RBC COMMENTS ANISOCYTOSIS 1+ 
    
 RBC COMMENTS HYPOCHROMIA PRESENT 
    
METABOLIC PANEL, COMPREHENSIVE Collection Time: 01/26/21  3:13 PM  
Result Value Ref Range Sodium 139 136 - 145 mmol/L Potassium 3.7 3.5 - 5.1 mmol/L Chloride 100 97 - 108 mmol/L  
 CO2 36 (H) 21 - 32 mmol/L Anion gap 3 (L) 5 - 15 mmol/L Glucose 262 (H) 65 - 100 mg/dL BUN 28 (H) 6 - 20 MG/DL  Creatinine 1.42 (H) 0.55 - 1.02 MG/DL  
 BUN/Creatinine ratio 20 12 - 20 GFR est AA 44 (L) >60 ml/min/1.73m2 GFR est non-AA 36 (L) >60 ml/min/1.73m2 Calcium 7.9 (L) 8.5 - 10.1 MG/DL Bilirubin, total 0.6 0.2 - 1.0 MG/DL  
 ALT (SGPT) 38 12 - 78 U/L  
 AST (SGOT) 24 15 - 37 U/L Alk. phosphatase 242 (H) 45 - 117 U/L Protein, total 5.5 (L) 6.4 - 8.2 g/dL Albumin 2.2 (L) 3.5 - 5.0 g/dL Globulin 3.3 2.0 - 4.0 g/dL A-G Ratio 0.7 (L) 1.1 - 2.2 NT-PRO BNP Collection Time: 01/26/21  3:13 PM  
Result Value Ref Range NT pro-BNP 1,443 (H) <125 PG/ML  
TROPONIN I Collection Time: 01/26/21  3:13 PM  
Result Value Ref Range Troponin-I, Qt. <0.05 <0.05 ng/mL SARS-COV-2 Collection Time: 01/26/21  4:00 PM  
Result Value Ref Range SARS-CoV-2 Please find results under separate order COVID-19 RAPID TEST Collection Time: 01/26/21  4:00 PM  
Result Value Ref Range Specimen source Nasopharyngeal    
 COVID-19 rapid test Not detected NOTD Recent Labs  
  01/26/21 
1513 WBC 18.4* HGB 7.2* HCT 25.5*  
 Recent Labs  
  01/26/21 
1513   
K 3.7  CO2 36* * BUN 28* CREA 1.42* CA 7.9* ALB 2.2* TBILI 0.6 ALT 38 Imaging XR CHEST PORT Portable AP upright view of the chest obtained, comparison January 7, 2020. There is some moderate interstitial edema with cardiomegaly and possible 
infiltrate and-or effusion at the left lung base. 
  
IMPRESSION 
 impression: Cardiomegaly interstitial edema and increased density left lung 
base as above. Assessment and Plan 1. Pneumonia  
     - admit to remote telemetry unit with h/o paroxysmal afib 
     - continue IV antibiotics Azithromycin 500 mg IV q 24 hours and Ceftriaxone 1 gram IV q 24 hours 2. Shortness of breath (SOB) - with history of chronic hypoxic respiratory failure - provide supplemental oxygen and continuous pulse oximetry monitoring 3.   Acute on chronic diastolic CHF (HFpEF) 
     - given Lasix 20 mg IV x 1 dose - place on strict Is & Os/ daily weights to monitor fluid status closely 
     - last 2d echocardiogram 9/30/2020: LVEF = 60% - 65% 
     - consider for cardiology consultation 4. Suspected COVID 19 virus infection 
     - initial rapid COVID 19 test is negative but await PCR test results 
     - order Vitamin C, D, and Zinc 
     - Dexamethasone 6 mg q 24 hours 5. Leukocytosis 
     - repeat CBC in a.m. 
 
6.  Anemia 
     - transfuse for hemoglobin < 7.0. 
     - repeat H/H 
     - check iron profile, B12, folate levels 7. Uncontrolled type 2 DM with hyperglycemia 
     - Order Humalog insulin correctional coverage, scheduled blood glucose checks and check hemoglobin A1c level. 8.  Acute superimposed on chronic kidney disease stage 3 
     - BUN = 28. Creatinine = 1.42 (compared to 1.37 on 1/21/2021) 
     - no IV fluids ordered due to noted CHF 
     - repeat renal panel in a.m. 
     - order UA 9. Elevated alkaline phosphatase 
     - repeat CMP in a.m. 
 
10.  Hypertension 
       - hold on home Lisinopril - HCTZ with elevated creatinine. Repeat labs in a.m.    
       - provide anti-hypertensive therapy as needed and monitor BP closely. 11.  History of GI bleed - s/p upper GI endoscopy and colonoscopy on 1/4/2021 with Adenomatous polyp of transverse colon [D12.3] Candida esophagitis ([facility] [B37.81] Gastritis and duodenitis [  
 
12.  Obesity 
       - would recommend eventual weight loss, heart healthy diet, and lifestyle modification 13. GERD 
       - Protonix 14. Paroxysmal atrial fibrillation (afib) 
       - on long term anticoagulation on Eliquis VTE prophylaxis:  Plan as above CODE STATUS:  FULL CODE as discussed with patient who requests the same.   
 
 
Signed by: James Eubanks MD  
 January 26, 2021 at 5:45 PM

## 2021-01-26 NOTE — ED TRIAGE NOTES
Patient arrives via EMS from [facility] for low hgb and shortness of breath Wears 3L nasal cannula baseline, wearing 4L upon arrival

## 2021-01-27 NOTE — CONSULTS
Gastroenterology Consult Referring Physician: Piper Mendez Consult Date: 1/27/2021 Subjective: Chief Complaint: Weakness, anemia History of Present Illness: Fabian Webb is a 70 y.o. female who is seen in consultation for anemia. Well-known from hospital stay earlier this month. Chronic medical problems are numerous; had intestinal issues of chronic diarrhea, evidence for vitamin B12 deficiency. Ferritin was normal; she has chronic anemia and 4 weeks ago occult intestinal blood loss. Endoscopic examination was without definitive diagnosis; stool sample for fecal fat was negative; endoscopic biopsies were without any diagnosis as far as diarrhea concerned and there were no cancer issues, no chronic blood loss anemia issues. Currently being admitted to the hospital with profound weakness, worsening anemia; she denies visible blood loss, vomiting, fever. Ferritin level is currently 71 Past Medical History:  
Diagnosis Date  Asthma  Atrial fibrillation ([address] 11/16/2018  Autoimmune disease ([address]   
 fibromyalgia  CAD (coronary artery disease) 10/9/2015  Closed wedge compression fracture of T7 vertebra ([address] 11/13/2018  COPD (chronic obstructive pulmonary disease) (HCC)  Diabetes ([address]  DVT (deep vein thrombosis) in pregnancy  GERD (gastroesophageal reflux disease)  Heart failure ([address]  History of vascular access device 09/30/2020  
 4f midline, 12cm at 1cm out placed in L Cephalic by Yohana Osorio RN  
 HTN (hypertension)  NSTEMI (non-ST elevated myocardial infarction) ([address] 10/9/2015  Obesity (BMI 30-39.9) 11/13/2018  PAD (peripheral artery disease) ([address] prior stenting  Sleep apnea   
 wears CPAP  
 Statin intolerance Past Surgical History:  
Procedure Laterality Date  COLONOSCOPY N/A 1/4/2021 COLONOSCOPY performed by Lyn Arreola MD at [address] [facility]  HX COLOSTOMY and reversal, post episiotomy repair [address] [facility] UROLOGICAL  2009  
 bladder sling  IR KYPHOPLASTY LUMBAR  10/8/2020  [place] ABDOMEN SURGERY PROC UNLISTED    
 hital hernia repair, gall bladder removed  [place] AMPUTATION TRANSMETACARPAL Right 06/12/2019  
 entire ring finger, 2nd & 3rd fingers (transmetacarpal)  [place] BREAST SURGERY PROCEDURE UNLISTED    
 lumpectomy  [place] CARDIAC SURG PROCEDURE UNLIST  10/9/15  
 2 stents Family History Problem Relation Age of Onset  Diabetes Mother  Heart Failure Mother  Kidney Disease Mother  Diabetes Father  Heart Disease Father  Elevated Lipids Father  Heart Failure Father  Heart Disease Brother  Cancer Brother   
     kidney  Diabetes Brother  No Known Problems Brother  No Known Problems Brother Social History Tobacco Use  Smoking status: Former Smoker Quit date: 3/30/2017 Years since quitting: 3.8  Smokeless tobacco: Never Used Substance Use Topics  Alcohol use: No  
  Alcohol/week: 0.0 standard drinks Comment: very very rarely Allergies Allergen Reactions  Advair Diskus [Fluticasone Propion-Salmeterol] Rash  Aspartame Other (comments)  Breo Ellipta [Fluticasone Furoate-Vilanterol] Rash  Bumex [Bumetanide] Myalgia  Ciprofloxacin Rash  Statins-Hmg-Coa Reductase Inhibitors Other (comments) Muscle pain Lipitor/crestor/zocor  Sulfa (Sulfonamide Antibiotics) Rash  Tetracycline Other (comments) musclepain  Torsemide Rash and Myalgia  Zetia [Ezetimibe] Myalgia Current Facility-Administered Medications Medication Dose Route Frequency  glucose chewable tablet 16 g  4 Tab Oral PRN  
 dextrose (D50W) injection syrg 12.5-25 g  25-50 mL IntraVENous PRN  
 glucagon (GLUCAGEN) injection 1 mg  1 mg IntraMUSCular PRN  
 insulin lispro (HUMALOG) injection   SubCUTAneous AC&HS  
  cefTRIAXone (ROCEPHIN) 1 g in sterile water (preservative free) 10 mL IV syringe  1 g IntraVENous Q24H  
 azithromycin (ZITHROMAX) 500 mg in 0.9% sodium chloride 250 mL (VIAL-MATE)  500 mg IntraVENous Q24H  
 0.9% sodium chloride infusion 250 mL  250 mL IntraVENous PRN  
 albuterol-ipratropium (DUO-NEB) 2.5 MG-0.5 MG/3 ML  3 mL Nebulization Q6H RT  
 arformoteroL (BROVANA) neb solution 15 mcg  15 mcg Nebulization BID RT  
 [START ON 1/28/2021] predniSONE (DELTASONE) tablet 20 mg  20 mg Oral DAILY WITH BREAKFAST  ondansetron (ZOFRAN) injection 4 mg  4 mg IntraVENous Q4H PRN  
 albuterol (PROVENTIL HFA, VENTOLIN HFA, PROAIR HFA) inhaler 2 Puff  2 Puff Inhalation Q4H PRN  
 amiodarone (CORDARONE) tablet 400 mg  400 mg Oral BID WITH MEALS  
 apixaban (ELIQUIS) tablet 5 mg  5 mg Oral BID  aspirin chewable tablet 81 mg  81 mg Oral DAILY  cholecalciferol (VITAMIN D3) (1000 Units /25 mcg) tablet 2 Tab  2,000 Units Oral DAILY  cyanocobalamin (VITAMIN B12) tablet 1,000 mcg  1,000 mcg Oral DAILY  dilTIAZem ER (CARDIZEM CD) capsule 360 mg  360 mg Oral DAILY  DULoxetine (CYMBALTA) capsule 60 mg  60 mg Oral DAILY  ferrous sulfate tablet 325 mg  325 mg Oral DAILY WITH BREAKFAST  lipase-protease-amylase (CREON 24,000) capsule 1 Cap  1 Cap Oral TID WITH MEALS  mirtazapine (REMERON) tablet 15 mg  15 mg Oral QHS  montelukast (SINGULAIR) tablet 10 mg  10 mg Oral QPM  
 pantoprazole (PROTONIX) tablet 40 mg  40 mg Oral ACB  sodium chloride (NS) flush 5-40 mL  5-40 mL IntraVENous Q8H  
 sodium chloride (NS) flush 5-40 mL  5-40 mL IntraVENous PRN  
 acetaminophen (TYLENOL) tablet 650 mg  650 mg Oral Q6H PRN Or  
 acetaminophen (TYLENOL) suppository 650 mg  650 mg Rectal Q6H PRN  polyethylene glycol (MIRALAX) packet 17 g  17 g Oral DAILY PRN  
 guaiFENesin-dextromethorphan (ROBITUSSIN DM) 100-10 mg/5 mL syrup 5 mL  5 mL Oral Q4H PRN Review of Systems: A detailed 10 organ review of systems is obtained with pertinent positives as listed in the History of Present Illness and Past Medical History. All others are negative. Objective:  
 
Physical Exam: 
Visit Vitals BP (!) 117/49 (BP 1 Location: Right arm, BP Patient Position: Post activity) Pulse 66 Temp 97.4 °F (36.3 °C) Resp 18 SpO2 92% Skin:  Extremities and face reveal no rashes. No gonzalez erythema. Anasarca, diffuse edema HEENT: Sclerae anicteric. Extra-occular muscles are intact. No oral ulcers. Cardiovascular: Regular rate and rhythm. No murmurs, gallops, or rubs. Respiratory:  Comfortable breathing with no accessory muscle use. Clear breath sounds with no wheezes, rales, or rhonchi. GI:  Abdomen nondistended, soft, and nontender. Normal active bowel sounds. No enlargement of the liver or spleen. No masses palpable. Musculoskeletal: Diffuse pitting edema of the lower legs. Neurological:  Gross memory appears intact. Patient is alert and oriented. Psychiatric:  Mood appears appropriate with judgement intact. Lymphatic:  No cervical or supraclavicular adenopathy. Lab/Data Review: 
CMP:  
Lab Results Component Value Date/Time MG 2.3 01/27/2021 01:58 AM  
 
CBC:  
Lab Results Component Value Date/Time WBC 18.4 (H) 01/27/2021 01:58 AM  
 HGB 6.8 (L) 01/27/2021 01:58 AM  
 HCT 23.8 (L) 01/27/2021 01:58 AM  
  01/27/2021 01:58 AM  
 
 
 
Assessment/Plan: Active Problems: 
  Chronic respiratory failure with hypoxia ([address] (11/2/2020) Overview: On 3L NC at home Leukocytosis (11/3/2020) Pneumonia (1/26/2021) SOB (shortness of breath) (1/26/2021) Anemia (1/26/2021) Acute on chronic systolic CHF (congestive heart failure) ([address] (1/26/2021) Uncontrolled type 2 diabetes mellitus with hyperglycemia ([address] (1/26/2021) Recommend Parenteral B12, Stool for occult blood Pancreatic enzyme therapy Thank you very much

## 2021-01-27 NOTE — DIABETES MGMT
Sai Kirk [address] SPECIALIST CONSULT NOTE Presentation Luigi Garcia is a 70 y.o. female admitted 1/26/21 with shortness of breath. HX:  
Past Medical History:  
Diagnosis Date  Asthma  Atrial fibrillation ([facility] 11/16/2018  Autoimmune disease ([facility]   
 fibromyalgia  CAD (coronary artery disease) 10/9/2015  Closed wedge compression fracture of T7 vertebra ([facility] 11/13/2018  COPD (chronic obstructive pulmonary disease) (HCC)  Diabetes ([facility]  DVT (deep vein thrombosis) in pregnancy  GERD (gastroesophageal reflux disease)  Heart failure ([facility]  History of vascular access device 09/30/2020  
 4f midline, 12cm at 1cm out placed in L Cephalic by Lul Colon RN  
 HTN (hypertension)  NSTEMI (non-ST elevated myocardial infarction) ([facility] 10/9/2015  Obesity (BMI 30-39.9) 11/13/2018  PAD (peripheral artery disease) ([facility] prior stenting  Sleep apnea   
 wears CPAP  
 Statin intolerance Current clinical course has been uncomplicated. Diabetes: Patient has known Type 2 diabetes, diagnosed in 2007. Home diabetes medication regimen is Onglyza 5mg daily. Admission  and A1c 5.5% indicate good diabetes control. Consulted by Provider for advanced diabetes nursing assessment and care, specifically related to  
[x] Home management assessment Diabetes-related medical history Acute complications 
denies Neurological complications 
denies Microvascular disease Nephropathy Macrovascular disease CAD and Myocardial infarction Other associated conditions    
denies Diabetes medication history Drug class Currently in use Discontinued Never used Biguanide DDP-4 inhibitor  Onglyza 5 mg daily Sulfonylurea Thiazolidinedione GLP-1 RA SGLT-2 inhibitors Basal insulin Bolus insulin Fixed Dose  Combinations Subjective Patient sleeping, easily arouses to voice. Patient alert and oriented. Patient without complaints at this time. Patient reports the following home diabetes self-care practices: 
Eating pattern 
[x] Breakfast Bagel with cheese 
[x] Lunch  sandwich 
[x] Dinner  Hot meal: meat/vegetable/starch [x] Snacks Popcorn, cheez its, cheese and crackers 
[x] Beverages Water and sweet tea (splenda) Physical activity pattern No specific exercise routine Monitoring pattern Patient reports checks BG every morning and runs in low 100's. Taking medications pattern 
[x] Consistent administration 
[x] Affordable Social determinants of health impacting diabetes self-management practices Patient denies any issues or concerns at this time. Objective Physical exam 
General Alert, oriented and in no acute distress. Conversant and cooperative. Vital Signs Visit Vitals BP (!) 107/41 (BP 1 Location: Right arm, BP Patient Position: At rest) Pulse 62 Temp 98.2 °F (36.8 °C) Resp 22 SpO2 93% Skin  Warm and dry. No Acanthosis noted along neckline. No lipohypertrophy or lipoatrophy noted at injection sites Heart   Regular rate and rhythm. No murmurs, rubs or gallops Lungs  Clear to auscultation without rales or rhonchi Extremities Purple discoloration noted to the top of both feet. Healed wound to the underside of left big toe and right heel. Laboratory Lab Results Component Value Date/Time Hemoglobin A1c 5.5 01/27/2021 01:58 AM  
 
Lab Results Component Value Date/Time LDL, calculated 68.8 10/10/2015 05:00 AM  
 
Lab Results Component Value Date/Time Creatinine (POC) 1.1 08/01/2019 11:41 AM  
 Creatinine 1.42 (H) 01/26/2021 03:13 PM  
 
Lab Results Component Value Date/Time  Sodium 139 01/26/2021 03:13 PM  
 Potassium 3.7 01/26/2021 03:13 PM  
 Chloride 100 01/26/2021 03:13 PM  
 CO2 36 (H) 01/26/2021 03:13 PM  
 Anion gap 3 (L) 01/26/2021 03:13 PM  
 Glucose 262 (H) 01/26/2021 03:13 PM  
 BUN 28 (H) 01/26/2021 03:13 PM  
 Creatinine 1.42 (H) 01/26/2021 03:13 PM  
 BUN/Creatinine ratio 20 01/26/2021 03:13 PM  
 GFR est AA 44 (L) 01/26/2021 03:13 PM  
 GFR est non-AA 36 (L) 01/26/2021 03:13 PM  
 Calcium 7.9 (L) 01/26/2021 03:13 PM  
 Bilirubin, total 0.6 01/26/2021 03:13 PM  
 Alk. phosphatase 242 (H) 01/26/2021 03:13 PM  
 Protein, total 5.5 (L) 01/26/2021 03:13 PM  
 Albumin 2.2 (L) 01/26/2021 03:13 PM  
 Globulin 3.3 01/26/2021 03:13 PM  
 A-G Ratio 0.7 (L) 01/26/2021 03:13 PM  
 ALT (SGPT) 38 01/26/2021 03:13 PM  
 
Lab Results Component Value Date/Time ALT (SGPT) 38 01/26/2021 03:13 PM  
 
 
 
Factors affecting BG management Factor Dose Comments Nutrition: 
Carb-controlled meals 60 grams/meal   
Drugs: 
Vasopressor load Steroids HIV Epogen Blood transfusion(s) Other: n/a  
n/a 
n/a A1cs inaccurate A1cs inaccurate Pain 0/10 Infection n/a Other: n/a n/a Blood glucose pattern Evaluation This 70year old female, with Type 2 diabetes, did achieve diabetes control prior to admission (PTA), as evidenced by admission BG of 262 and A1c of 5.5%. Based on assessment of diabetes self-care practices, interventions that warrant action while hospitalized include: 
 [x] Healthy Eating    
[x] Being Active [x] Monitoring The patient would also benefit from diabetes self-management education and support [facility]) after discharge. During this hospitalization, the patient has achieved inpatient blood glucose target of 100-180mg/dl. BG's have ranged  over the past 24 hours. Factors that have played a role include: 
[x]  Kidney dysfunction Basal insulin isn't in use. Bolus insulin isn't in use. Patient is eating meals. Corrective insulin is in use. Patient has not required any corrective insulin over the past 24 hours. To optimize BG control and support a positive health outcome, would utilize the Subcutaneous Insulin Order set (2862). Impression: It is suspected that corrective insulin alone will not be sufficient to maintain BG control at target. It is likely that patient will require low dose basal insulin to maintain BG at target. Assessment and Plan Nursing Diagnosis Risk for unstable blood glucose pattern Nursing Intervention Domain 4759 Decision-making Support Nursing Interventions Examined current inpatient diabetes control Explored factors facilitating and impeding inpatient management Identified self-management practices impeding diabetes control Explored corrective strategies with patient and responsible inpatient provider Informed patient of rational for insulin strategy while hospitalized Recommendations Recommend: 
 
[x] Use of Subcutaneous Insulin Order set (0431) Insulin Use Recommendation Basal                                      (Based on weight, BMI & GFR) Addresses basic metabolic needs If FBG is > 200 mg/dL, start Lantus 20 units daily Nutritional                                      (Based on CHO load) Addresses nutrition interventions If patient experiences pre-prandial BG > 200mg/dL, start Humalog 6 units with meals. Corrective                                       (Offset gaps in dosing) Useful in adjusting insulin dosing Correctional Scale for Normal Sensitivity 200-249- 2units Humalog [phone] Humalog [phone] Humalog [phone] Humalog Over 400- 10units Humalog Do NOT hold for NPO; give in addition to meal time insulin dose. If patient does not eat, -give correction dose only. [x] Referral to 
 
[x] Diabetes Self-Management Training through [facility] (Phone [phone] to schedule appointment) Billing Code(s) Total time spent with patient: 35 Minutes. I personally reviewed medical record, including notes, data and current medications, and examined the patient at bedside before making care recommendations. [x] 74744 IP subsequent hospital care - 35 minutes Michael Chang, CNS Diabetes Clinical Nurse Specialist 
[facility] Access via [address] [place] [address]

## 2021-01-27 NOTE — PROGRESS NOTES
Admission Medication Reconciliation: 
  
Information obtained from:   
-Transfer paperwork from [address] [place] [address]: Yes Comments/Recommendations:  
Confirmed , allergies, and preferred pharmacy Updated PTA medication list 
New medications added: PRN Zofran, PRN Miralax, and Lasix 20mg PO daily ¹[employer] pharmacy benefit data reflects medications filled and processed through the patient's insurance, however this data does NOT capture whether the medication was picked up or is currently being taken by the patient. Facility-Administered Medications:   
 
Prior to Admission Medications Prescriptions Last Dose Informant Taking? DULoxetine (CYMBALTA) 60 mg capsule 2021 at am Transfer Papers Yes Sig: Take 60 mg by mouth daily. acetaminophen (TYLENOL) 325 mg tablet  Transfer Papers Yes Sig: Take 1 Tab by mouth every four (4) hours as needed for Pain. albuterol (PROAIR HFA) 90 mcg/actuation inhaler  Transfer Papers Yes Sig: Take 2 Puffs by inhalation every four (4) hours as needed. albuterol (PROVENTIL VENTOLIN) 2.5 mg /3 mL (0.083 %) nebulizer solution  Transfer Papers Yes Si.5 mg by Nebulization route every six (6) hours as needed for Wheezing or Shortness of Breath. alendronate (FOSAMAX) 70 mg tablet 2021 at am Transfer Papers Yes Sig: Take 70 mg by mouth every seven (7) days. On Sundays  
amiodarone (CORDARONE) 200 mg tablet 2021 at am Transfer Papers Yes Sig: Take 400 mg by mouth two (2) times a day. apixaban (ELIQUIS) 5 mg tablet 2021 at am Transfer Papers Yes Sig: Take 5 mg by mouth two (2) times a day. aspirin 81 mg chewable tablet 2021 at am Transfer Papers Yes Sig: Take 81 mg by mouth daily. biotin 10,000 mcg cap 2021 at am Transfer Papers Yes Sig: Take 1 Cap by mouth daily. cholecalciferol, vitamin D3, (Vitamin D3) 50 mcg (2,000 unit) tab 2021 at am Transfer Papers Yes Sig: Take 2,000 Units by mouth daily. cyanocobalamin 1,000 mcg tablet 1/26/2021 at am Transfer Papers Yes Sig: Take 1 Tab by mouth daily. digoxin (LANOXIN) 0.125 mg tablet 1/26/2021 at am Transfer Papers Yes Sig: Take 1 Tab by mouth daily. dilTIAZem ER (CARDIZEM CD) 360 mg capsule 1/26/2021 at am Transfer Papers Yes Sig: Take 1 Cap by mouth daily. ferrous sulfate 325 mg (65 mg iron) tablet 1/26/2021 at am Transfer Papers Yes Sig: Take 1 Tab by mouth daily (with breakfast). furosemide (Lasix) 20 mg tablet 1/26/2021 at am Transfer Papers Yes Sig: Take 20 mg by mouth daily. honey (MediHoney, honey,) 100 % topical paste 1/26/2021 at am Transfer Papers Yes Sig: Apply  to affected area daily. lactobacillus rhamnosus gg 10 billion cell (CULTURELLE) 10 billion cell capsule 1/26/2021 at am Transfer Papers Yes Sig: Take 1 Cap by mouth daily. lipase-protease-amylase (CREON 24,000) 24,000-76,000 -120,000 unit capsule 1/26/2021 at am Transfer Papers Yes Sig: Take 1 Cap by mouth three (3) times daily (with meals). loperamide (IMODIUM) 2 mg capsule 1/26/2021 at am Transfer Papers Yes Sig: Take 4 mg by mouth two (2) times a day. magnesium oxide (MAG-OX) 400 mg tablet 1/25/2021 at pm Transfer Papers Yes Sig: Take 400 mg by mouth nightly. mirtazapine (REMERON) 15 mg tablet 1/25/2021 at pm Transfer Papers Yes Sig: Take 15 mg by mouth nightly. mometasone-formoterol (DULERA) 200-5 mcg/actuation HFA inhaler 1/26/2021 at am Transfer Papers Yes Sig: Take 2 Puffs by inhalation two (2) times a day. montelukast (Singulair) 10 mg tablet 1/25/2021 at pm Transfer Papers Yes Sig: Take 10 mg by mouth every evening. ondansetron hcl (Zofran) 4 mg tablet  Transfer Papers Yes Sig: Take 4 mg by mouth every six (6) hours as needed for Nausea or Vomiting. pantoprazole (PROTONIX) 40 mg tablet 1/26/2021 at am Transfer Papers Yes Sig: Take 1 Tab by mouth Daily (before breakfast). phosphorus (K PHOS NEUTRAL) 250 mg tablet 1/26/2021 at am Transfer Papers Yes Sig: Take 1 Tab by mouth two (2) times a day. polyethylene glycol (Miralax) 17 gram/dose powder  Transfer Papers Yes Sig: Take 17 g by mouth daily as needed for Constipation. potassium chloride SR (KLOR-CON 10) 10 mEq tablet 1/26/2021 at am Transfer Papers Yes Sig: Take 10 mEq by mouth two (2) times a day. predniSONE (DELTASONE) 20 mg tablet 1/26/2021 at am Transfer Papers Yes Sig: Take 20 mg by mouth daily (with breakfast). sAXagliptin (ONGLYZA) 5 mg tab tablet 1/25/2021 at pm Transfer Papers Yes Sig: Take 5 mg by mouth Daily (before dinner). tiotropium bromide (Spiriva Respimat) 2.5 mcg/actuation inhaler 1/26/2021 at am Transfer Papers Yes Sig: Take 2 Puffs by inhalation daily. zinc oxide 20 % ointment 1/26/2021 at am Transfer Papers Yes Sig: Apply  to affected area three (3) times daily. Bilateral gluteal and perianal area each shift Facility-Administered Medications: None Please contact the main inpatient pharmacy with any questions or concerns at [phone] and we will direct you to the clinical pharmacist covering this patient's care while in-house. Duane Pellant, Pharm. D.

## 2021-01-27 NOTE — ED NOTES
Bedside and Verbal shift change report given to Loreto Golden (oncoming nurse) by Tu Belle (offgoing nurse). Report included the following information SBAR, ED Summary, MAR and Recent Results.

## 2021-01-27 NOTE — PROGRESS NOTES
Spoke with Dr. Angel Luis Negro about negative rapid COVID test. Received order to discontinue the droplet plus isolation order and put in a consult to GI.

## 2021-01-27 NOTE — PROGRESS NOTES
1/27/2021 
2:01 PM 
 
Case management note Reason for Readmission:     SOB 
 
BCMI letter given EVAL done with patient face to face, only I was masked Patient returned to hospital with SOB from [facility] 
1/1/2021 - 1/1/52021 left lower lobe pneumonia Patient had lived independently at [facility]. On last discharge she went to snf at [facility]. She is not progressing well, not walking yet and now back at hospital. She has a walker for ambulation. She has placed her name on list for assisted living at [facility] upon completion of rehab. Pelon Keys daughter is her  [phone] Patient uses oxygen 24/7 supplied by [facility]. Patient has history of DM, chf, afib, asthma, fibromyalgia, CAD and COPD. RUR Score/Risk Level:     45% High PCP: First and Last name:  Arelis Klein Name of Practice:  
 Are you a current patient: Yes/No: yes Approximate date of last visit: information not availble Can you participate in a virtual visit with your PCP:  
 
Is a Care Conference indicated: goals of care, long term planning, palliative consult Did you attend your follow up appointment (s): If not, why not:in snf Resources/supports as identified by patient/family:   Patient had lived independently before last hospitalization, has been doing rehab at [facility]. She has placed her name on list for assisted living. Top Challenges facing patient (as identified by patient/family and CM): Finances/Medication cost?     Medicare, [employer] Transportation      Patient had been driving, some times used shuttle and family Support system or lack thereof? [facility], family Living arrangements? Independent living, placed request to move to [place] Self-care/ADLs/Cognition? Limited self care, poor health cognition Current Advanced Directive/Advance Care Plan:  Yes, not on file, ACP note to chart Plan for utilizing home health:   Currently at [facility] Transition of Care Plan:    Based on readmission, the patient's previous Plan of Care 
 has been evaluated and/or modified. The current Transition of Care Plan is: 1. Return to snf 2. PCP follow up 3. Long term planning 4. Palliative consult 5. CM to follow for discharge needs Care Management Interventions PCP Verified by CM: Neeraj Sanders) Mode of Transport at Discharge: [facility] Current Support Network: [address] [place] [address] Confirm Follow Up Transport: Other (see comment) The Plan for Transition of Care is Related to the Following Treatment Goals : SOB Discharge Location Discharge Placement: Skilled nursing facility Readmission Assessment Number of days since last admission?: 8-30 days Previous disposition: Acute Rehab Who is being interviewed?: Patient What was the patient's/caregiver's perception as to why they think they needed to return back to the hospital?: Other (Comment)(came to hospital for SOB) Did you visit your Primary Care Physician after you left the hospital, before you returned this time?: No(at skilled nursing facility) Why weren't you able to visit your PCP?: Other (Comment)(at skilled nursing facility) Who advised the patient to return to the hospital?: Skilled Unit Does the patient report anything that got in the way of taking their medications?: No 
In our efforts to provide the best possible care to you and others like you, can you think of anything that we could have done to help you after you left the hospital the first time, so that you might not have needed to return so soon?: (patient has got on list for assisted living at [facility]) Jerry Hogan

## 2021-01-27 NOTE — ACP (ADVANCE CARE PLANNING)
1/27/2021 
2:32 PM 
 
Advance Care Planning Advance Care Planning Activator (Inpatient) Conversation Note Date of ACP Conversation: 01/27/21 Conversation Conducted with:   Patient with Decision Making Capacity ACP Activator: [facility] Care Decision Maker: 
 
Current Designated Health Care Decision Maker:  
  Primary Decision Maker: Micaela Christina - Daughter - [phone] Care Preferences Ventilation: \"If you were in your present state of health and suddenly became very ill and were unable to breathe on your own, what would your preference be about the use of a ventilator (breathing machine) if it were available to you? \" If patient would desire the use of a ventilator (breathing machine), answer \"yes\", if not \"no\":yes \"If your health worsens and it becomes clear that your chance of recovery is unlikely, what would your preference be about the use of a ventilator (breathing machine) if it were available to you? \" Would the patient desire the use of a ventilator (breathing machine)? NO Resuscitation \"CPR works best to restart the heart when there is a sudden event, like a heart attack, in someone who is otherwise healthy. Unfortunately, CPR does not typically restart the heart for people who have serious health conditions or who are very sick. \" \"In the event your heart stopped as a result of an underlying serious health condition, would you want attempts to be made to restart your heart (answer \"yes\" for attempt to resuscitate) or would you prefer a natural death (answer \"no\" for do not attempt to resuscitate)? \" yes [x] Yes  [] No   Educated Patient  regarding differences between Advance Directives and portable DNR orders. Length of ACP Conversation in minutes: 12 minutes Conversation Outcomes: 
[x] ACP discussion completed 
[] Existing advance directive reviewed with patient; no changes to patient's previously recorded wishes [] New Advance Directive completed 
 [] Portable Do Not Resuscitate prepared for Provider review and signature 
[] POLST/POST/MOLST/MOST prepared for Provider review and signature Follow-up plan:   
[] Schedule follow-up conversation to continue planning 
[] Referred individual to Provider for additional questions/concerns  
[] Advised patient/agent/surrogate to review completed ACP document and update if needed with changes in condition, patient preferences or care setting  
 
[] This note routed to one or more involved healthcare providers Jerry Hogan

## 2021-01-27 NOTE — CONSULTS
Name: Jeanette Fernandez  
: 1945 Admit Date: 2021 Phone: [phone]  Room: [place] [address] PCP: Kyle Santiago MD  MRN: [id number] Date: 2021  Code: Full Code Chart and notes reviewed. Data reviewed. I review the patient's current medications in the medical record at each encounter. I have evaluated and examined the patient. HPI: 
 
1:42 PM    
 
History was obtained from patient, records review I was asked by James Eubanks MD to see Ila Gonzales in consultation for a chief complaint of SOB/PNA. History of Present Illness: 69 y/o F with PMH asthma/COPD (FEV1 51%)  on chronic prednisone 20mg daily, chronic hypoxemic respiratory failure (3L O2), HAYES on CPAP, afib, CAD, HFpEF, DM, DVT, PAD, CKD, GERD, obesity, fibromyalgia who was recently admitted -1/15/21 for anemia, GIB, asthma/COPD exacerbation. On the day of discharge her hgb was 7.9. She now presents from [facility] for low hgb and shortness of breath. Hgb on 21 was 6.5 Labs this admission: hgb 7.2, wbc 18, ddimer 1.77, Cr 1.42, lactic acid 1.07, bnp 1443, pct 0.29, crp 5.04, covid 19 rapid and pcr negative. Images: CXR 21 shows interstitial edema and stable increased density left lung. New infiltrate right lung. ROS: Tells me she has been short of breath since her prior admission but this has increased over the last few days. Left-sided chest pain for the last couple days which is constant and reproducible to palpation. She c/o dizziness for about a week. BLE swelling and redness is at her baseline. She denies f/c, cough, blood in the stool Currently on her baseline 3L O2 via nc Past Medical History:  
Diagnosis Date  Asthma  Atrial fibrillation ([address] 2018  Autoimmune disease ([address]   
 fibromyalgia  CAD (coronary artery disease) 10/9/2015  Closed wedge compression fracture of T7 vertebra ([address] 2018  COPD (chronic obstructive pulmonary disease) (HCC)  Diabetes ([address]  DVT (deep vein thrombosis) in pregnancy  GERD (gastroesophageal reflux disease)  Heart failure ([address]  History of vascular access device 09/30/2020  
 4f midline, 12cm at 1cm out placed in L Cephalic by Samira Nixon RN  
 HTN (hypertension)  NSTEMI (non-ST elevated myocardial infarction) ([address] 10/9/2015  Obesity (BMI 30-39.9) 11/13/2018  PAD (peripheral artery disease) ([facility] prior stenting  Sleep apnea   
 wears CPAP  
 Statin intolerance Past Surgical History:  
Procedure Laterality Date  COLONOSCOPY N/A 1/4/2021 COLONOSCOPY performed by Cha Washburn MD at [address]  HX COLOSTOMY    
 and reversal, post episiotomy repair [address] [place]  HX UROLOGICAL  2009  
 bladder sling  IR KYPHOPLASTY LUMBAR  10/8/2020  IN ABDOMEN SURGERY PROC UNLISTED    
 hital hernia repair, gall bladder removed  IN AMPUTATION TRANSMETACARPAL Right 06/12/2019  
 entire ring finger, 2nd & 3rd fingers (transmetacarpal)  IN BREAST SURGERY PROCEDURE UNLISTED    
 lumpectomy  IN CARDIAC SURG PROCEDURE UNLIST  10/9/15  
 2 stents Family History Problem Relation Age of Onset  Diabetes Mother  Heart Failure Mother  Kidney Disease Mother  Diabetes Father  Heart Disease Father  Elevated Lipids Father  Heart Failure Father  Heart Disease Brother  Cancer Brother   
     kidney  Diabetes Brother  No Known Problems Brother  No Known Problems Brother Social History Tobacco Use  Smoking status: Former Smoker Quit date: 3/30/2017 Years since quitting: 3.8  Smokeless tobacco: Never Used Substance Use Topics  Alcohol use: No  
  Alcohol/week: 0.0 standard drinks Comment: very very rarely Allergies Allergen Reactions  Advair Diskus [Fluticasone Propion-Salmeterol] Rash  Aspartame Other (comments)  Breo Ellipta [Fluticasone Furoate-Vilanterol] Rash  Bumex [Bumetanide] Myalgia  Ciprofloxacin Rash  Statins-Hmg-Coa Reductase Inhibitors Other (comments) Muscle pain Lipitor/crestor/zocor  Sulfa (Sulfonamide Antibiotics) Rash  Tetracycline Other (comments) musclepain  Torsemide Rash and Myalgia  Zetia [Ezetimibe] Myalgia Current Facility-Administered Medications Medication Dose Route Frequency  glucose chewable tablet 16 g  4 Tab Oral PRN  
 dextrose (D50W) injection syrg 12.5-25 g  25-50 mL IntraVENous PRN  
 glucagon (GLUCAGEN) injection 1 mg  1 mg IntraMUSCular PRN  
 insulin lispro (HUMALOG) injection   SubCUTAneous AC&HS  
 albuterol (PROVENTIL HFA, VENTOLIN HFA, PROAIR HFA) inhaler 2 Puff  2 Puff Inhalation Q4H PRN  
 amiodarone (CORDARONE) tablet 400 mg  400 mg Oral BID WITH MEALS  
 apixaban (ELIQUIS) tablet 5 mg  5 mg Oral BID  aspirin chewable tablet 81 mg  81 mg Oral DAILY  cholecalciferol (VITAMIN D3) (1000 Units /25 mcg) tablet 2 Tab  2,000 Units Oral DAILY  cyanocobalamin (VITAMIN B12) tablet 1,000 mcg  1,000 mcg Oral DAILY  dilTIAZem ER (CARDIZEM CD) capsule 360 mg  360 mg Oral DAILY  DULoxetine (CYMBALTA) capsule 60 mg  60 mg Oral DAILY  ferrous sulfate tablet 325 mg  325 mg Oral DAILY WITH BREAKFAST  lipase-protease-amylase (CREON 24,000) capsule 1 Cap  1 Cap Oral TID WITH MEALS  mirtazapine (REMERON) tablet 15 mg  15 mg Oral QHS  montelukast (SINGULAIR) tablet 10 mg  10 mg Oral QPM  
 pantoprazole (PROTONIX) tablet 40 mg  40 mg Oral ACB  sodium chloride (NS) flush 5-40 mL  5-40 mL IntraVENous Q8H  
 sodium chloride (NS) flush 5-40 mL  5-40 mL IntraVENous PRN  
 acetaminophen (TYLENOL) tablet 650 mg  650 mg Oral Q6H PRN Or  
 acetaminophen (TYLENOL) suppository 650 mg  650 mg Rectal Q6H PRN  polyethylene glycol (MIRALAX) packet 17 g  17 g Oral DAILY PRN  
  guaiFENesin-dextromethorphan (ROBITUSSIN DM) 100-10 mg/5 mL syrup 5 mL  5 mL Oral Q4H PRN Current Outpatient Medications Medication Sig  
 amiodarone (CORDARONE) 200 mg tablet Take 400 mg by mouth two (2) times a day.  ondansetron hcl (Zofran) 4 mg tablet Take 4 mg by mouth every six (6) hours as needed for Nausea or Vomiting.  polyethylene glycol (Miralax) 17 gram/dose powder Take 17 g by mouth daily as needed for Constipation.  furosemide (Lasix) 20 mg tablet Take 20 mg by mouth daily.  predniSONE (DELTASONE) 20 mg tablet Take 20 mg by mouth daily (with breakfast).  dilTIAZem ER (CARDIZEM CD) 360 mg capsule Take 1 Cap by mouth daily.  lipase-protease-amylase (CREON 24,000) 24,000-76,000 -120,000 unit capsule Take 1 Cap by mouth three (3) times daily (with meals).  pantoprazole (PROTONIX) 40 mg tablet Take 1 Tab by mouth Daily (before breakfast).  phosphorus (K PHOS NEUTRAL) 250 mg tablet Take 1 Tab by mouth two (2) times a day.  aspirin 81 mg chewable tablet Take 81 mg by mouth daily.  apixaban (ELIQUIS) 5 mg tablet Take 5 mg by mouth two (2) times a day.  alendronate (FOSAMAX) 70 mg tablet Take 70 mg by mouth every seven (7) days. On Sundays  zinc oxide 20 % ointment Apply  to affected area three (3) times daily. Bilateral gluteal and perianal area each shift  loperamide (IMODIUM) 2 mg capsule Take 4 mg by mouth two (2) times a day.  honey (MediHoney, honey,) 100 % topical paste Apply  to affected area daily.  cyanocobalamin 1,000 mcg tablet Take 1 Tab by mouth daily.  digoxin (LANOXIN) 0.125 mg tablet Take 1 Tab by mouth daily.  ferrous sulfate 325 mg (65 mg iron) tablet Take 1 Tab by mouth daily (with breakfast).  tiotropium bromide (Spiriva Respimat) 2.5 mcg/actuation inhaler Take 2 Puffs by inhalation daily.  montelukast (Singulair) 10 mg tablet Take 10 mg by mouth every evening.  sAXagliptin (ONGLYZA) 5 mg tab tablet Take 5 mg by mouth Daily (before dinner).  acetaminophen (TYLENOL) 325 mg tablet Take 1 Tab by mouth every four (4) hours as needed for Pain.  magnesium oxide (MAG-OX) 400 mg tablet Take 400 mg by mouth nightly.  potassium chloride SR (KLOR-CON 10) 10 mEq tablet Take 10 mEq by mouth two (2) times a day.  mirtazapine (REMERON) 15 mg tablet Take 15 mg by mouth nightly.  albuterol (PROVENTIL VENTOLIN) 2.5 mg /3 mL (0.083 %) nebulizer solution 2.5 mg by Nebulization route every six (6) hours as needed for Wheezing or Shortness of Breath.  albuterol (PROAIR HFA) 90 mcg/actuation inhaler Take 2 Puffs by inhalation every four (4) hours as needed.  lactobacillus rhamnosus gg 10 billion cell (CULTURELLE) 10 billion cell capsule Take 1 Cap by mouth daily.  biotin 10,000 mcg cap Take 1 Cap by mouth daily.  DULoxetine (CYMBALTA) 60 mg capsule Take 60 mg by mouth daily.  cholecalciferol, vitamin D3, (Vitamin D3) 50 mcg (2,000 unit) tab Take 2,000 Units by mouth daily.  mometasone-formoterol (DULERA) 200-5 mcg/actuation HFA inhaler Take 2 Puffs by inhalation two (2) times a day. REVIEW OF SYSTEMS  
12 point ROS negative except as stated in the HPI. Physical Exam:  
Visit Vitals BP (!) 107/41 (BP 1 Location: Right arm, BP Patient Position: At rest) Pulse 62 Temp 98.2 °F (36.8 °C) Resp 22 SpO2 93% General:  Alert, cooperative, no distress, appears stated age. Head:  Normocephalic, without obvious abnormality, atraumatic. Eyes:  Conjunctivae/corneas clear. Nose: Nares normal. Septum midline. Mucosa normal.   
Throat: Lips, mucosa, and tongue normal.   
Neck: Supple, symmetrical, trachea midline Lungs:   Bilateral crackles. Chest wall:  TTP Heart:  Irregular rhythm. +murmur Abdomen:   Soft, non-tender.  Bowel sounds normal.  
 Extremities: BLE 3+ edema. Right foot cyanotic and cool, normal capillary refill. Extremities atraumatic Pulses: 2+ and symmetric all extremities. Skin: BLE erythema, LLE warmth Lymph nodes: Cervical, supraclavicular nodes normal.  
Neurologic: Grossly nonfocal  
 
 
Lab Results Component Value Date/Time Sodium 139 01/26/2021 03:13 PM  
 Potassium 3.7 01/26/2021 03:13 PM  
 Chloride 100 01/26/2021 03:13 PM  
 CO2 36 (H) 01/26/2021 03:13 PM  
 BUN 28 (H) 01/26/2021 03:13 PM  
 Creatinine 1.42 (H) 01/26/2021 03:13 PM  
 Glucose 262 (H) 01/26/2021 03:13 PM  
 Calcium 7.9 (L) 01/26/2021 03:13 PM  
 Magnesium 2.3 01/27/2021 01:58 AM  
 Phosphorus 2.2 (L) 01/03/2021 01:59 AM  
 Lactic acid 1.0 01/27/2021 01:59 AM  
 
 
Lab Results Component Value Date/Time WBC 18.4 (H) 01/27/2021 01:58 AM  
 HGB 6.8 (L) 01/27/2021 01:58 AM  
 PLATELET 388 98/06/1452 01:58 AM  
 MCV 98.3 01/27/2021 01:58 AM  
 
 
Lab Results Component Value Date/Time INR 1.1 01/27/2021 01:58 AM  
 aPTT 25.3 01/27/2021 01:58 AM  
 Alk. phosphatase 242 (H) 01/26/2021 03:13 PM  
 Protein, total 5.5 (L) 01/26/2021 03:13 PM  
 Albumin 2.2 (L) 01/26/2021 03:13 PM  
 Globulin 3.3 01/26/2021 03:13 PM  
 
 
Lab Results Component Value Date/Time Iron 16 (L) 09/29/2020 08:27 PM  
 TIBC 305 09/29/2020 08:27 PM  
 Iron % saturation 5 (L) 09/29/2020 08:27 PM  
 Ferritin 69 01/27/2021 01:58 AM  
 
 
Lab Results Component Value Date/Time C-Reactive protein 5.04 (H) 01/27/2021 01:58 AM  
 TSH 3.40 01/13/2021 11:29 AM  
  
 
No results found for: PH, PHI, PCO2, PCO2I, PO2, PO2I, HCO3, HCO3I, FIO2, FIO2I Lab Results Component Value Date/Time CK 18 (L) 05/21/2019 06:13 AM  
 CK-MB Index 4.4 (H) 11/13/2018 03:26 PM  
 Troponin-I, Qt. <0.05 01/27/2021 01:58 AM  
  
 
Lab Results Component Value Date/Time  Culture result: NO GROWTH 5 DAYS 01/01/2021 06:19 PM  
 Culture result: NO GROWTH 5 DAYS 11/05/2020 02:32 PM  
 Culture result: LIGHT MIXED SKIN NUBIA ISOLATED 11/02/2020 07:25 PM  
 
 
Lab Results Component Value Date/Time Hepatitis B surface Ag <0.10 05/30/2020 12:31 AM  
 
 
Lab Results Component Value Date/Time Vancomycin,trough 37.6 (HH) 05/19/2019 08:11 AM  
 CK 18 (L) 05/21/2019 06:13 AM  
 CK 25 (L) 11/13/2018 03:26 PM  
 
 
Lab Results Component Value Date/Time Color YELLOW/STRAW 01/01/2021 12:20 AM  
 Appearance CLEAR 01/01/2021 12:20 AM  
 Specific gravity 1.010 01/06/2019 04:42 AM  
 pH (UA) 5.5 01/01/2021 12:20 AM  
 Protein Negative 01/01/2021 12:20 AM  
 Glucose Negative 01/01/2021 12:20 AM  
 Ketone Negative 01/01/2021 12:20 AM  
 Bilirubin Negative 01/01/2021 12:20 AM  
 Blood Negative 01/01/2021 12:20 AM  
 Urobilinogen 0.2 01/01/2021 12:20 AM  
 Nitrites Positive (A) 01/01/2021 12:20 AM  
 Leukocyte Esterase Negative 01/01/2021 12:20 AM  
 WBC 0-4 01/01/2021 12:20 AM  
 RBC 0-5 01/01/2021 12:20 AM  
 Bacteria 1+ (A) 01/01/2021 12:20 AM  
 
 
IMPRESSION 
· Anemia · Shortness of breath · Abnormal cxr: shows interstitial edema and stable increased density left lung. New infiltrate right lung which may represent PNA vs. Edema · Cellulitis · NAIDA on CKD · Asthma, COPD not in acute exacerbation. On chronic prednisone 20mg daily · Chronic hypoxemic respiratory failure · afib on Eliquis · HFpEF 
· CAD 
· HAYES on CPAP 
· H/o DVT · PAD · GERD 
 
PLAN 
· Cont supp o2 for goal sats>88%-- on baseline 3L · Diurese if BP allows (SBP only 98 on on my eval so will hold for now) · Start ceftriaxone, azithro · Resume home prednisone 20mg daily · Start scheduled combivent and pulmicort nebs in place of home spiriva/dulera · singulair · CPAP qhs Thank you for allowing us to participate in the care of this patient. We will be happy to follow along in her progress with you. Raimundo Pfeiffer

## 2021-01-27 NOTE — PROGRESS NOTES
1439: TRANSFER - IN REPORT: 
 
Verbal report received from Estephanie RN(name) on [address]  being received from ED (unit) for routine progression of care Report consisted of patients Situation, Background, Assessment and  
Recommendations(SBAR). Information from the following report(s) SBAR, Kardex, Intake/Output, MAR, Accordion and Cardiac Rhythm NSR was reviewed with the receiving nurse. Opportunity for questions and clarification was provided. Assessment completed upon patients arrival to unit and care assumed. 1653: Ok to give amiodarone per pharmacy.

## 2021-01-27 NOTE — PROGRESS NOTES
Notified Dr. Royal that the GI doctor has not seen the patient yet. Received order to transfuse 1 unit of PRBC and repeat CBC in the morning. Also received an order for diabetic diet.  
 
TRANSFER - OUT REPORT: 
 
Verbal report given to Pamela(name) on Sherry Jalloh  being transferred to [facility](unit) for routine progression of care    
 
Report consisted of patient’s Situation, Background, Assessment and  
Recommendations(SBAR).  
 
Information from the following report(s) SBAR, Kardex, ED Summary, Intake/Output, MAR, Recent Results, Med Rec Status and Cardiac Rhythm SR was reviewed with the receiving nurse. 
 
Lines:  
Peripheral IV 01/27/21 Left;Upper Arm (Active)  
Site Assessment Clean, dry, & intact 01/27/21 1209  
Phlebitis Assessment 0 01/27/21 1209  
Infiltration Assessment 0 01/27/21 1209  
Dressing Status Clean, dry, & intact 01/27/21 1209  
Dressing Type Transparent 01/27/21 1209  
Hub Color/Line Status Pink;Flushed 01/27/21 1209  
Alcohol Cap Used Yes 01/27/21 0800  
  
 
Opportunity for questions and clarification was provided.   
 
Patient transported with: 
 Registered Nurse  
3L O2

## 2021-01-27 NOTE — PROGRESS NOTES
Daily Progress Note: 1/27/2021 Pa Arcos MD 
 
Assessment/Plan: 1. Pneumonia  
     - admit to remote telemetry unit with h/o paroxysmal afib 
     - continue IV antibiotics Azithromycin 500 mg IV q 24 hours and Ceftriaxone 1 gram IV q 24 hours 2. Shortness of breath (SOB) - with history of chronic hypoxic respiratory failure - provide supplemental oxygen and continuous pulse oximetry monitoring 
  
3. Acute on chronic diastolic CHF (HFpEF) 
     - given Lasix 20 mg IV x 1 dose - place on strict Is & Os/ daily weights to monitor fluid status closely 
     - last 2d echocardiogram 9/30/2020: LVEF = 60% - 65% 
     - consider for cardiology consultation 
  
4. Suspected COVID 19 virus infection 
     - initial rapid COVID 19 test is negative but await PCR test results 
     - order Vitamin C, D, and Zinc 
     - Dexamethasone 6 mg q 24 hours 
  
5.  Leukocytosis 
     - repeat CBC in a.m. 
  
6. Anemia 
     - transfuse for hemoglobin < 7.0. 
     - repeat H/H 
     - check iron profile, B12, folate levels 
  
7. Uncontrolled type 2 DM with hyperglycemia 
     - Order Humalog insulin correctional coverage, scheduled blood glucose checks and check hemoglobin A1c level. 
  
8. Acute superimposed on chronic kidney disease stage 3 
     - BUN = 28. Creatinine = 1.42 (compared to 1.37 on 1/21/2021) 
     - no IV fluids ordered due to noted CHF 
     - repeat renal panel in a.m. 
     - order UA 
  
9.  Elevated alkaline phosphatase 
     - repeat CMP in a.m. 
  
10. Hypertension 
       - hold on home Lisinopril - HCTZ with elevated creatinine. Repeat labs in a.m.    
       - provide anti-hypertensive therapy as needed and monitor BP closely. 
  
11. History of GI bleed - s/p upper GI endoscopy and colonoscopy on 1/4/2021 with Adenomatous polyp of transverse colon [D12.3] Candida esophagitis ([facility] [B37.81] Gastritis and duodenitis [  
   
12.  Obesity 
       - would recommend eventual weight loss, heart healthy diet, and lifestyle modification 
  
13. GERD 
       - Protonix 
  
14. Paroxysmal atrial fibrillation (afib) 
       - on long term anticoagulation on Eliquis 
  
VTE prophylaxis:  Plan as above 
  
CODE STATUS:  FULL CODE as discussed with patient who requests the same. Problem List: 
Problem List as of 1/27/2021 Date Reviewed: 11/2/2020 Codes Class Noted - Resolved Pneumonia ICD-10-CM: J18.9 ICD-9-CM: 120  1/26/2021 - Present SOB (shortness of breath) ICD-10-CM: R06.02 
ICD-9-CM: 786.05  1/26/2021 - Present Anemia ICD-10-CM: D64.9 ICD-9-CM: 285.9  1/26/2021 - Present Acute on chronic systolic CHF (congestive heart failure) (HCC) ICD-10-CM: L57.63 ICD-9-CM: 428.23, 428.0  1/26/2021 - Present Uncontrolled type 2 diabetes mellitus with hyperglycemia (HCC) ICD-10-CM: E11.65 ICD-9-CM: 250.02  1/26/2021 - Present Left lower lobe pneumonia ICD-10-CM: J18.9 ICD-9-CM: 104  1/1/2021 - Present Leukocytosis ICD-10-CM: X32.680 ICD-9-CM: 288.60  11/3/2020 - Present Chronic respiratory failure with hypoxia [facility]) ICD-10-CM: J96.11 
ICD-9-CM: 518.83, 799.02  11/2/2020 - Present Overview Signed 11/2/2020 10:12 PM by Raphael Galarza MD  
  On 3L NC at home Cellulitis of lower extremity ICD-10-CM: L03.119 ICD-9-CM: 682.6  3/23/2020 - Present ASO (arteriosclerosis obliterans) ICD-10-CM: I70.90 ICD-9-CM: 440.9  9/5/2019 - Present PVD (peripheral vascular disease) ([facility] ICD-10-CM: I73.9 ICD-9-CM: 443.9  8/1/2019 - Present Severe obesity ([facility] ICD-10-CM: E66.01 
ICD-9-CM: 278.01  7/30/2019 - Present COPD (chronic obstructive pulmonary disease) (HCC) ICD-10-CM: J44.9 ICD-9-CM: 307  7/13/2019 - Present Normocytic anemia ICD-10-CM: D64.9 ICD-9-CM: 285.9  7/13/2019 - Present PAD (peripheral artery disease) (HCC) ICD-10-CM: I73.9 ICD-9-CM: 443.9  7/13/2019 - Present Mild intermittent asthma ICD-10-CM: J45.20 ICD-9-CM: 493.90  5/17/2019 - Present Brachial artery thrombus (HCC) ICD-10-CM: O86.8 ICD-9-CM: 444.21  4/26/2019 - Present Sleep apnea ICD-10-CM: G47.30 ICD-9-CM: 780.57  1/5/2019 - Present Overview Signed 1/5/2019  8:41 PM by Sujata Fernandes DO  
  wears CPAP Atrial fibrillation [facility]) ICD-10-CM: I48.91 
ICD-9-CM: 427.31  11/16/2018 - Present Obesity (BMI 30-39.9) (Chronic) ICD-10-CM: N80.6 ICD-9-CM: 278.00  11/13/2018 - Present Recurrent deep vein thrombosis (DVT) (HCC) ICD-10-CM: I82.409 ICD-9-CM: 453.40  3/30/2018 - Present Chronic anticoagulation (Chronic) ICD-10-CM: Z79.01 
ICD-9-CM: V58.61  3/30/2018 - Present Essential hypertension (Chronic) ICD-10-CM: I10 
ICD-9-CM: 401.9  1/22/2016 - Present CAD (coronary artery disease) ICD-10-CM: I25.10 ICD-9-CM: 414.00  10/9/2015 - Present Type II diabetes mellitus (HCC) (Chronic) ICD-10-CM: E11.9 ICD-9-CM: 250.00  10/9/2015 - Present RESOLVED: Syncope and collapse ICD-10-CM: R55 
ICD-9-CM: 780.2  9/29/2020 - 11/2/2020 RESOLVED: Cellulitis ICD-10-CM: L03.90 ICD-9-CM: 682.9  3/23/2020 - 5/29/2020 RESOLVED: Hypokalemia ICD-10-CM: E87.6 ICD-9-CM: 276.8  3/23/2020 - 5/29/2020 RESOLVED: Hyponatremia ICD-10-CM: E87.1 ICD-9-CM: 276.1  3/23/2020 - 5/29/2020 RESOLVED: Diarrhea ICD-10-CM: R19.7 ICD-9-CM: 787.91  3/23/2020 - 5/29/2020 RESOLVED: Syncope and collapse ICD-10-CM: R55 
ICD-9-CM: 780.2  7/13/2019 - 5/29/2020 RESOLVED: UTI (urinary tract infection) ICD-10-CM: N39.0 ICD-9-CM: 599.0  7/13/2019 - 11/2/2020 RESOLVED: Sepsis ([facility] ICD-10-CM: A41.9 ICD-9-CM: 038.9, 995.91  7/13/2019 - 11/3/2020 RESOLVED: Leg wound, left ICD-10-CM: F21.846W ICD-9-CM: 891.0  7/13/2019 - 5/29/2020 RESOLVED: Leg laceration, right, initial encounter ICD-10-CM: C15.389I ICD-9-CM: 894.0  7/13/2019 - 5/29/2020 RESOLVED: Cellulitis of right upper arm ICD-10-CM: L03.113 ICD-9-CM: 682.3  5/17/2019 - 5/29/2020 RESOLVED: Gangrene of finger of right hand ([facility] ICD-10-CM: C28 
ICD-9-CM: 785.4  5/17/2019 - 11/2/2020 RESOLVED: Bowel obstruction ([facility] ICD-10-CM: D43.322 ICD-9-CM: 560.9  1/8/2019 - 5/29/2020 RESOLVED: Partial small bowel obstruction ([facility] ICD-10-CM: K56.600 ICD-9-CM: 560.9  1/5/2019 - 5/29/2020 RESOLVED: Dyspnea ICD-10-CM: R06.00 
ICD-9-CM: 786.09  11/13/2018 - 5/29/2020 RESOLVED: ARF (acute renal failure) (HCC) ICD-10-CM: N17.9 ICD-9-CM: 584.9  11/13/2018 - 5/29/2020 RESOLVED: Closed wedge compression fracture of T7 vertebra ([facility] ICD-10-CM: K77.763L ICD-9-CM: 805.2  11/13/2018 - 5/29/2020 RESOLVED: Type 2 diabetes with nephropathy ([facility] ICD-10-CM: E11.21 
ICD-9-CM: 250.40, 583.81  10/1/2018 - 11/2/2020 RESOLVED: Chest pain ICD-10-CM: R07.9 ICD-9-CM: 786.50  6/27/2018 - 5/29/2020 RESOLVED: Abdominal pain ICD-10-CM: R10.9 ICD-9-CM: 789.00  6/27/2018 - 5/29/2020 RESOLVED: Coronary artery disease involving native coronary artery without angina pectoris (Chronic) ICD-10-CM: I25.10 ICD-9-CM: 414.01  1/22/2016 - 11/2/2020 RESOLVED: HTN (hypertension) ICD-10-CM: I10 
ICD-9-CM: 401.9  10/9/2015 - 1/22/2016 RESOLVED: NSTEMI (non-ST elevated myocardial infarction) ([facility] ICD-10-CM: I21.4 ICD-9-CM: 410.70  10/9/2015 - 5/29/2020 Subjective: 70 y.o. female with past medical history of chronic hypoxic respiratory failure (on 3 liters home oxygen), asthma, atrial fibrillation (Afib), autoimmune disease, fibromyalgia, CAD, heart failure (HFpEF), T7 vertebral compression fracture, COPD, type 2 DM, DVT, GERD, hypertension, NSTEMI, PAD, sleep apnea, obesity presented to the ED from home with chief complaints of shortness of breath (SOB) and low hemoglobin. Patient has chronic SOB but worsened. She was last hospitalized here from 1/1/2021 - 1/15/2021 with left lower lobe pneumonia, acute on chronic hypoxic respiratory failure, sepsis, GI bleed, anemia. Patient was transfused with 2 units PRBCs with hemoglobin = 4.9 on 1/1/2021. Last Hgb= 6.5 on 1/21/2021. On arrival in the ED today, initial recorded vital signs were BP = 139/47, HR= 82, RR= 22, O2sat= 94% on 4 liters O2 nasal cannula (NC). WBC = 18,400. proBNP = 1,443. Chest xray portable showed cardiomegaly, interstitial edema, and density in left lung base. Patient is now seen for admission to the hospitalist service for continued evaluation and treatments. There were no reports of new onset syncope, loss of consciousness, headache, neck pain, visual disturbance, numbness, paresthesias, focal weakness, chest pain, palpitations, abdominal pain, nausea, vomiting, diarrhea, melena, dysuria, hematuria, calf pain, increased leg swelling/ edema, fever, chills, rash, or known COVID 19 exposure. (Dr Tiffany Colbert) 1/27:  Feeling better with less shortness of breath. Hb low so needs another transfusion. No clear source of bleeding. Covid negative. GI to see. Review of Systems: A comprehensive review of systems was negative except for that written in the HPI. Objective:  
Physical Exam:  
 
Visit Vitals BP (!) 116/54 (BP 1 Location: Right arm, BP Patient Position: At rest) Pulse 71 Temp 97.6 °F (36.4 °C) Resp 20 SpO2 97% O2 Flow Rate (L/min): 3 l/min O2 Device: Nasal cannula Temp (24hrs), Av.6 °F (36.4 °C), Min:97.6 °F (36.4 °C), Max:97.6 °F (36.4 °C) No intake/output data recorded. No intake/output data recorded. General:  Alert, obese, cooperative, no distress, appears stated age. Head:  Normocephalic, without obvious abnormality, atraumatic. Eyes:  Conjunctivae/corneas clear. EOMs intact. Nose: Nares normal. Septum midline. Mucosa normal. No drainage or sinus tenderness. Throat: Lips, mucosa, and tongue moist.. Neck: Supple, symmetrical, trachea midline, no adenopathy, thyroid: no enlargement/tenderness/nodules, no carotid bruit and no JVD. Back:   Symmetric, no curvature. ROM normal. No CVA tenderness. Lungs:   Clear to auscultation bilaterally. Chest wall:  No tenderness or deformity. Heart:  Regular rate and rhythm, S1, S2 normal, no murmur, click, rub or gallop. Abdomen:   Soft, non-tender. Bowel sounds normal. No masses,  No organomegaly. Extremities: no cyanosis. 2+ LE edema on top of chronic stasis changes. No calf tenderness or cords. Pulses: 2+ and symmetric all extremities. Skin: Skin color, texture, turgor normal. No rashes Neurologic: CNII-XII intact. Alert and oriented X 3. Fine motor of hands and fingers normal.   equal.  No cogwheeling or rigidity. Gait not tested at this time. Sensation grossly normal to touch. Gross motor of extremities normal except generalized weakness. Data Review:  
   
Recent Days: 
Recent Labs  
  21 
0158 21 
1513 WBC 18.4* 18.4* HGB 6.8* 7.2* HCT 23.8* 25.5*  
 354 Recent Labs  
  21 
0158 21 
1513 NA  --  139 K  --  3.7 CL  --  100 CO2  --  36* GLU  --  262* BUN  --  28* CREA  --  1.42* CA  --  7.9*  
MG 2.3  --   
ALB  --  2.2* TBILI  --  0.6 ALT  --  38 INR 1.1  -- No results for input(s): PH, PCO2, PO2, HCO3, FIO2 in the last 72 hours. 24 Hour Results: 
Recent Results (from the past 24 hour(s)) POC LACTIC ACID  
 Collection Time: 01/26/21  3:04 PM  
Result Value Ref Range  
 Lactic Acid (POC) 1.07 0.40 - 2.00 mmol/L  
SAMPLES BEING HELD  
 Collection Time: 01/26/21  3:13 PM  
Result Value Ref Range  
 SAMPLES BEING HELD 1PST,1SST,1RED,1LAV,1BLUE.BLDCS   
 COMMENT     
  Add-on orders for these samples will be processed based on acceptable specimen integrity and analyte stability, which may vary by analyte.  
CBC WITH AUTOMATED DIFF  
 Collection Time: 01/26/21  3:13 PM  
Result Value Ref Range  
 WBC 18.4 (H) 3.6 - 11.0 K/uL  
 RBC 2.56 (L) 3.80 - 5.20 M/uL  
 HGB 7.2 (L) 11.5 - 16.0 g/dL  
 HCT 25.5 (L) 35.0 - 47.0 %  
 MCV 99.6 (H) 80.0 - 99.0 FL  
 MCH 28.1 26.0 - 34.0 PG  
 MCHC 28.2 (L) 30.0 - 36.5 g/dL  
 RDW 19.7 (H) 11.5 - 14.5 %  
 PLATELET 354 150 - 400 K/uL  
 MPV 10.3 8.9 - 12.9 FL  
 NRBC 0.4 (H) 0  WBC  
 ABSOLUTE NRBC 0.08 (H) 0.00 - 0.01 K/uL  
 NEUTROPHILS 90 (H) 32 - 75 %  
 LYMPHOCYTES 3 (L) 12 - 49 %  
 MONOCYTES 4 (L) 5 - 13 %  
 EOSINOPHILS 1 0 - 7 %  
 BASOPHILS 0 0 - 1 %  
 IMMATURE GRANULOCYTES 2 (H) 0.0 - 0.5 %  
 ABS. NEUTROPHILS 16.5 (H) 1.8 - 8.0 K/UL  
 ABS. LYMPHOCYTES 0.6 (L) 0.8 - 3.5 K/UL  
 ABS. MONOCYTES 0.7 0.0 - 1.0 K/UL  
 ABS. EOSINOPHILS 0.2 0.0 - 0.4 K/UL  
 ABS. BASOPHILS 0.0 0.0 - 0.1 K/UL  
 ABS. IMM. GRANS. 0.4 (H) 0.00 - 0.04 K/UL  
 DF AUTOMATED    
 RBC COMMENTS ANISOCYTOSIS 
1+ 
    
 RBC COMMENTS HYPOCHROMIA 
PRESENT 
    
METABOLIC PANEL, COMPREHENSIVE  
 Collection Time: 01/26/21  3:13 PM  
Result Value Ref Range  
 Sodium 139 136 - 145 mmol/L  
 Potassium 3.7 3.5 - 5.1 mmol/L  
 Chloride 100 97 - 108 mmol/L  
 CO2 36 (H) 21 - 32 mmol/L  
 Anion gap 3 (L) 5 - 15 mmol/L  
 Glucose 262 (H) 65 - 100 mg/dL  
 BUN 28 (H) 6 - 20 MG/DL  
 Creatinine 1.42 (H) 0.55 - 1.02 MG/DL  
 BUN/Creatinine ratio 20 12 - 20    
 GFR est AA 44 (L) >60 ml/min/1.73m2  
 GFR est non-AA 36 (L) >60 ml/min/1.73m2  
 Calcium 7.9 (L) 8.5 - 10.1 MG/DL  
 Bilirubin, total 0.6 0.2 - 1.0 MG/DL  
 ALT (SGPT) 38 12 - 78 U/L  
 AST (SGOT) 24 15 - 37 U/L Alk. phosphatase 242 (H) 45 - 117 U/L Protein, total 5.5 (L) 6.4 - 8.2 g/dL Albumin 2.2 (L) 3.5 - 5.0 g/dL Globulin 3.3 2.0 - 4.0 g/dL A-G Ratio 0.7 (L) 1.1 - 2.2 NT-PRO BNP Collection Time: 01/26/21  3:13 PM  
Result Value Ref Range NT pro-BNP 1,443 (H) <125 PG/ML  
TROPONIN I Collection Time: 01/26/21  3:13 PM  
Result Value Ref Range Troponin-I, Qt. <0.05 <0.05 ng/mL TYPE & SCREEN Collection Time: 01/26/21  3:13 PM  
Result Value Ref Range Crossmatch Expiration 01/29/2021,2359 ABO/Rh(D) A POSITIVE Antibody screen NEG   
SARS-COV-2 Collection Time: 01/26/21  4:00 PM  
Result Value Ref Range SARS-CoV-2 Please find results under separate order COVID-19 RAPID TEST Collection Time: 01/26/21  4:00 PM  
Result Value Ref Range Specimen source Nasopharyngeal    
 COVID-19 rapid test Not detected NOTD    
CBC WITH AUTOMATED DIFF Collection Time: 01/27/21  1:58 AM  
Result Value Ref Range WBC 18.4 (H) 3.6 - 11.0 K/uL  
 RBC 2.42 (L) 3.80 - 5.20 M/uL HGB 6.8 (L) 11.5 - 16.0 g/dL HCT 23.8 (L) 35.0 - 47.0 % MCV 98.3 80.0 - 99.0 FL  
 MCH 28.1 26.0 - 34.0 PG  
 MCHC 28.6 (L) 30.0 - 36.5 g/dL  
 RDW 19.3 (H) 11.5 - 14.5 % PLATELET 144 094 - 156 K/uL MPV 10.2 8.9 - 12.9 FL  
 NRBC 0.3 (H) 0  WBC ABSOLUTE NRBC 0.05 (H) 0.00 - 0.01 K/uL NEUTROPHILS 86 (H) 32 - 75 % BAND NEUTROPHILS 2 0 - 6 % LYMPHOCYTES 7 (L) 12 - 49 % MONOCYTES 2 (L) 5 - 13 % EOSINOPHILS 0 0 - 7 % BASOPHILS 1 0 - 1 % MYELOCYTES 2 (H) 0 % IMMATURE GRANULOCYTES 0 %  
 ABS. NEUTROPHILS 16.2 (H) 1.8 - 8.0 K/UL  
 ABS. LYMPHOCYTES 1.3 0.8 - 3.5 K/UL  
 ABS. MONOCYTES 0.4 0.0 - 1.0 K/UL  
 ABS. EOSINOPHILS 0.0 0.0 - 0.4 K/UL  
 ABS. BASOPHILS 0.2 (H) 0.0 - 0.1 K/UL  
 ABS. IMM. GRANS. 0.0 K/UL  
 DF MANUAL PLATELET COMMENTS Large Platelets RBC COMMENTS ANISOCYTOSIS 2+ 
    
 RBC COMMENTS POLYCHROMASIA PRESENT 
    
 RBC COMMENTS HYPOCHROMIA PRESENT 
    
 RBC COMMENTS STOMATOCYTES PRESENT 
    
 WBC COMMENTS VACUOLATED POLYS    
MAGNESIUM Collection Time: 01/27/21  1:58 AM  
Result Value Ref Range Magnesium 2.3 1.6 - 2.4 mg/dL PROTHROMBIN TIME + INR Collection Time: 01/27/21  1:58 AM  
Result Value Ref Range INR 1.1 0.9 - 1.1 Prothrombin time 10.9 9.0 - 11.1 sec PTT Collection Time: 01/27/21  1:58 AM  
Result Value Ref Range aPTT 25.3 22.1 - 31.0 sec  
 aPTT, therapeutic range     58.0 - 77.0 SECS  
C REACTIVE PROTEIN, QT Collection Time: 01/27/21  1:58 AM  
Result Value Ref Range C-Reactive protein 5.04 (H) 0.00 - 0.60 mg/dL PROCALCITONIN Collection Time: 01/27/21  1:58 AM  
Result Value Ref Range Procalcitonin 0.29 ng/mL FIBRINOGEN Collection Time: 01/27/21  1:58 AM  
Result Value Ref Range Fibrinogen 425 200 - 475 mg/dL LD Collection Time: 01/27/21  1:58 AM  
Result Value Ref Range  81 - 246 U/L  
TROPONIN I Collection Time: 01/27/21  1:58 AM  
Result Value Ref Range Troponin-I, Qt. <0.05 <0.05 ng/mL D DIMER Collection Time: 01/27/21  1:58 AM  
Result Value Ref Range D-dimer 1.77 (H) 0.00 - 0.65 mg/L FEU  
SAMPLES BEING HELD Collection Time: 01/27/21  1:58 AM  
Result Value Ref Range SAMPLES BEING HELD 1LAV,1BLU   
 COMMENT Add-on orders for these samples will be processed based on acceptable specimen integrity and analyte stability, which may vary by analyte. LACTIC ACID Collection Time: 01/27/21  1:59 AM  
Result Value Ref Range Lactic acid 1.0 0.4 - 2.0 MMOL/L  
GLUCOSE, POC Collection Time: 01/27/21  7:57 AM  
Result Value Ref Range Glucose (POC) 89 65 - 100 mg/dL Performed by Aidan Ernandez Medications reviewed Current Facility-Administered Medications Medication Dose Route Frequency  glucose chewable tablet 16 g  4 Tab Oral PRN  
 dextrose (D50W) injection syrg 12.5-25 g  25-50 mL IntraVENous PRN  
 glucagon (GLUCAGEN) injection 1 mg  1 mg IntraMUSCular PRN  
 insulin lispro (HUMALOG) injection   SubCUTAneous AC&HS  
 albuterol (PROVENTIL HFA, VENTOLIN HFA, PROAIR HFA) inhaler 2 Puff  2 Puff Inhalation Q4H PRN  
 amiodarone (CORDARONE) tablet 400 mg  400 mg Oral BID WITH MEALS  
 apixaban (ELIQUIS) tablet 5 mg  5 mg Oral BID  aspirin chewable tablet 81 mg  81 mg Oral DAILY  cholecalciferol (VITAMIN D3) (1000 Units /25 mcg) tablet 2 Tab  2,000 Units Oral DAILY  cyanocobalamin (VITAMIN B12) tablet 1,000 mcg  1,000 mcg Oral DAILY  dilTIAZem ER (CARDIZEM CD) capsule 360 mg  360 mg Oral DAILY  DULoxetine (CYMBALTA) capsule 60 mg  60 mg Oral DAILY  ferrous sulfate tablet 325 mg  325 mg Oral DAILY WITH BREAKFAST  lipase-protease-amylase (CREON 24,000) capsule 1 Cap  1 Cap Oral TID WITH MEALS  mirtazapine (REMERON) tablet 15 mg  15 mg Oral QHS  montelukast (SINGULAIR) tablet 10 mg  10 mg Oral QPM  
 pantoprazole (PROTONIX) tablet 40 mg  40 mg Oral ACB  sodium chloride (NS) flush 5-40 mL  5-40 mL IntraVENous Q8H  
 sodium chloride (NS) flush 5-40 mL  5-40 mL IntraVENous PRN  
 acetaminophen (TYLENOL) tablet 650 mg  650 mg Oral Q6H PRN Or  
 acetaminophen (TYLENOL) suppository 650 mg  650 mg Rectal Q6H PRN  polyethylene glycol (MIRALAX) packet 17 g  17 g Oral DAILY PRN  
 guaiFENesin-dextromethorphan (ROBITUSSIN DM) 100-10 mg/5 mL syrup 5 mL  5 mL Oral Q4H PRN Current Outpatient Medications Medication Sig  
 amiodarone (CORDARONE) 200 mg tablet Take 400 mg by mouth two (2) times a day.  ondansetron hcl (Zofran) 4 mg tablet Take 4 mg by mouth every six (6) hours as needed for Nausea or Vomiting.  polyethylene glycol (Miralax) 17 gram/dose powder Take 17 g by mouth daily as needed for Constipation.  furosemide (Lasix) 20 mg tablet Take 20 mg by mouth daily.  predniSONE (DELTASONE) 20 mg tablet Take 20 mg by mouth daily (with breakfast).  dilTIAZem ER (CARDIZEM CD) 360 mg capsule Take 1 Cap by mouth daily.  lipase-protease-amylase (CREON 24,000) 24,000-76,000 -120,000 unit capsule Take 1 Cap by mouth three (3) times daily (with meals).  pantoprazole (PROTONIX) 40 mg tablet Take 1 Tab by mouth Daily (before breakfast).  phosphorus (K PHOS NEUTRAL) 250 mg tablet Take 1 Tab by mouth two (2) times a day.  aspirin 81 mg chewable tablet Take 81 mg by mouth daily.  apixaban (ELIQUIS) 5 mg tablet Take 5 mg by mouth two (2) times a day.  alendronate (FOSAMAX) 70 mg tablet Take 70 mg by mouth every seven (7) days. On Sundays  zinc oxide 20 % ointment Apply  to affected area three (3) times daily. Bilateral gluteal and perianal area each shift  loperamide (IMODIUM) 2 mg capsule Take 4 mg by mouth two (2) times a day.  honey (MediHoney, honey,) 100 % topical paste Apply  to affected area daily.  cyanocobalamin 1,000 mcg tablet Take 1 Tab by mouth daily.  digoxin (LANOXIN) 0.125 mg tablet Take 1 Tab by mouth daily.  ferrous sulfate 325 mg (65 mg iron) tablet Take 1 Tab by mouth daily (with breakfast).  tiotropium bromide (Spiriva Respimat) 2.5 mcg/actuation inhaler Take 2 Puffs by inhalation daily.  montelukast (Singulair) 10 mg tablet Take 10 mg by mouth every evening.  sAXagliptin (ONGLYZA) 5 mg tab tablet Take 5 mg by mouth Daily (before dinner).  acetaminophen (TYLENOL) 325 mg tablet Take 1 Tab by mouth every four (4) hours as needed for Pain.  magnesium oxide (MAG-OX) 400 mg tablet Take 400 mg by mouth nightly.  potassium chloride SR (KLOR-CON 10) 10 mEq tablet Take 10 mEq by mouth two (2) times a day.  mirtazapine (REMERON) 15 mg tablet Take 15 mg by mouth nightly.  albuterol (PROVENTIL VENTOLIN) 2.5 mg /3 mL (0.083 %) nebulizer solution 2.5 mg by Nebulization route every six (6) hours as needed for Wheezing or Shortness of Breath.  albuterol (PROAIR HFA) 90 mcg/actuation inhaler Take 2 Puffs by inhalation every four (4) hours as needed.  lactobacillus rhamnosus gg 10 billion cell (CULTURELLE) 10 billion cell capsule Take 1 Cap by mouth daily.  biotin 10,000 mcg cap Take 1 Cap by mouth daily.  DULoxetine (CYMBALTA) 60 mg capsule Take 60 mg by mouth daily.  cholecalciferol, vitamin D3, (Vitamin D3) 50 mcg (2,000 unit) tab Take 2,000 Units by mouth daily.  mometasone-formoterol (DULERA) 200-5 mcg/actuation HFA inhaler Take 2 Puffs by inhalation two (2) times a day. Care Plan discussed with: Patient/Family and Nurse Total time spent with patient: 30 minutes.  
Cesar Brito MD

## 2021-01-28 NOTE — NURSE NAVIGATOR
Chart reviewed by Heart Failure Nurse Navigator. Heart Failure database completed. Recent admissions: 
9/29/20 to 10/12/20 brought  from home by EMS with syncope and acute on chronic anemia with discharge to [facility] for rehab. 
11/2/20 to 11/6/20 admitted from [facility] with sepsis and cellulitis. Discharged back to [facility]. 1/1/21 to 1/15/21 brought  from home by EMS with pneumonia, GI bleed, and atrial fib with discharge to [facility] for rehab. Patient sent back to ED by [facility] 1/26 for profound anemia. Patient being treated for pneumonia, anemia, and received a dose of diuretic for element of acute on chronic HFpEF. Readmission Risk Score is 49. EF:  60% with mild concentric hypertrophy, normal cavity size and function. ACEi/ARB/ARNi: Not indicated. BB: Not indicated. Aldosterone Antagonist: Not indicated. Obstructive Sleep Apnea Screening: STOP-BANG score: 
 Referred to Sleep Medicine: CRT Not indicated. NYHA Functional Class **. Heart Failure Teach Back in Patient Education. Heart Failure Avoiding Triggers on Discharge Instructions. Cardiologist: Dr. Pastor Estevez Post discharge follow up phone call to be made within 48-72 hours of discharge.

## 2021-01-28 NOTE — PROGRESS NOTES
Cardiology Progress Note     [facility]   
NAME:  Jesse Joseph :   1945 MRN:   [id number] Assessment/Plan: 1. PAF: BP low this am 293 systolic while pt is sleeping. Reduce cardizem dose to 300 mg every day, change amio to 200 mg every day. On eliquis for anticoagulation. 2. Chronic resp failure: on 3 liters oxygen baseline. 3. HF p EF: resume lasix as BP allows. Home dose was 20 mg every day 4. Anemia 5. Probable PNA: on zithromax, rocephin. Pt personally seen and examined. Chart reviewed. Agree with advanced NP's history, exam and  A/P with changes/additons. Visit Vitals BP (!) 111/44 (BP 1 Location: Right arm, BP Patient Position: At rest) Comment: RN notified Pulse 77 Temp 97.7 °F (36.5 °C) Resp 28 Wt 248 lb (112.5 kg) SpO2 96% BMI 38.84 kg/m² Dec Cardizem dose On anticoagulation Reid Morgan. MD, [facility] Subjective:  
Jesse Joseph is a 70 y.o. female with past medical history of COPD on 3 liters nasal canula oxygen OP,  paroxysmal atrial fibrillation,  autoimmune disease, fibromyalgia, CAD, HFpEF, obesity presented to the ED from home with chief complaints of shortness of breath (SOB) and low hemoglobin. Patient has chronic SOB but worsened. She was last hospitalized here from 2021 - 1/15/2021 with left lower lobe pneumonia, acute on chronic hypoxic respiratory failure, sepsis, GI bleed, anemia. proBNP = 1,443. Chest xray portable showed cardiomegaly, interstitial edema, and density in left lung base Cardiology reconsulted for BP 0815: Patients BP low at 103/41 with a MAP of 55 and HR of 73. Dr. Juliann Gastelum notified and orders to put in consult to cardiology. Cardiac ROS: Patient denies any exertional chest pain or dyspnea. Previous Cardiac Eval 
20 ECHO ADULT COMPLETE 2020 Narrative · LV: Estimated LVEF is 60 - 65%. Visually measured ejection fraction. Normal cavity size and systolic function (ejection fraction normal). Mild  
concentric hypertrophy. Wall motion: normal. 
· AV: With no evidence of reduced excursion. Signed by: Shu Calzada MD  
 
XR Results (most recent): 
Results from [facility] Encounter encounter on 21 XR CHEST PORT Narrative EXAM: XR CHEST PORT INDICATION: evaluate infiltrate COMPARISON: 2021 FINDINGS: A portable AP radiograph of the chest was obtained at 0 3:30 hours. The patient is on a cardiac monitor. There is coarsening of the central 
markings. The left hemidiaphragm is blunted. A linear airspace process is 
present in the left midlung. Impression Central congestion with left pleural effusion. Left mid lung 
subsegmental atelectasis. Persistent left basilar compressive atelectasis versus 
infiltrate Review of Systems: ROS limited as pt is sleeping soundly Objective:  
 
Visit Vitals BP (!) 103/41 (BP 1 Location: Right arm, BP Patient Position: At rest) Comment: RN notified Pulse 73 Temp 97.9 °F (36.6 °C) Resp 26 Wt 112.5 kg (248 lb) SpO2 95% BMI 38.84 kg/m² O2 Flow Rate (L/min): 2 l/min O2 Device: Nasal cannula Temp (24hrs), Av.6 °F (36.4 °C), Min:97 °F (36.1 °C), Max:98.2 °F (36.8 °C) No intake/output data recorded.  1901 -  0700 In: 76 [P.O.:75] Out: 600 [Urine:600] TELE: SR PAC's int PAF General: Arousable HEENT: Atraumatic. Pink and moist.  Anicteric sclerae. Neck : Supple Lungs: Diminished bases Heart: Regular rhythm 2/6 systolic Abdomen: obese, Soft, non-distended, non-tender. + Bowel sounds. Extremities: generalized edema. Neurologic: Grossly intact. No acute neurological distress. Psych: limited insight. Not anxious or agitated. Care Plan discussed with: 
  Comments Patient Family RN x Care Manager Consultant:     
 
 
Data Review: No lab exists for component: ITNL Recent Labs  
  01/27/21 
0158 01/26/21 
1513 TROIQ <0.05 <0.05 Recent Labs  
  01/28/21 
0455 01/27/21 0158 01/26/21 
1513   --  139  
K 3.3*  --  3.7   --  100 CO2 33*  --  36*  
BUN 26*  --  28* CREA 1.18*  --  1.42* *  --  262* MG 2.5* 2.3  --   
ALB 2.1*  --  2.2* WBC 17.0* 18.4* 18.4* HGB 7.9* 6.8* 7.2* HCT 27.5* 23.8* 25.5*  
 369 354 Recent Labs  
  01/27/21 0158 INR 1.1 PTP 10.9 APTT 25.3 Medications reviewed Current Facility-Administered Medications Medication Dose Route Frequency  nystatin (MYCOSTATIN) 100,000 unit/mL oral suspension 500,000 Units  500,000 Units Oral QID  potassium chloride SR (KLOR-CON 10) tablet 20 mEq  20 mEq Oral BID  [START ON 1/29/2021] amiodarone (CORDARONE) tablet 200 mg  200 mg Oral DAILY  [START ON 1/29/2021] dilTIAZem ER (CARDIZEM CD) capsule 300 mg  300 mg Oral DAILY  glucose chewable tablet 16 g  4 Tab Oral PRN  
 dextrose (D50W) injection syrg 12.5-25 g  25-50 mL IntraVENous PRN  
 glucagon (GLUCAGEN) injection 1 mg  1 mg IntraMUSCular PRN  
 insulin lispro (HUMALOG) injection   SubCUTAneous AC&HS  cefTRIAXone (ROCEPHIN) 1 g in sterile water (preservative free) 10 mL IV syringe  1 g IntraVENous Q24H  
 azithromycin (ZITHROMAX) 500 mg in 0.9% sodium chloride 250 mL (VIAL-MATE)  500 mg IntraVENous Q24H  
 0.9% sodium chloride infusion 250 mL  250 mL IntraVENous PRN  
 albuterol-ipratropium (DUO-NEB) 2.5 MG-0.5 MG/3 ML  3 mL Nebulization Q6H RT  
 arformoteroL (BROVANA) neb solution 15 mcg  15 mcg Nebulization BID RT  
 predniSONE (DELTASONE) tablet 20 mg  20 mg Oral DAILY WITH BREAKFAST  ondansetron (ZOFRAN) injection 4 mg  4 mg IntraVENous Q4H PRN  
 cyanocobalamin (VITAMIN B12) injection 1,000 mcg  1,000 mcg IntraMUSCular DAILY  lipase-protease-amylase (CREON 24,000) capsule 2 Cap  2 Cap Oral TID WITH MEALS  
 albuterol (PROVENTIL HFA, VENTOLIN HFA, PROAIR HFA) inhaler 2 Puff  2 Puff Inhalation Q4H PRN  
 apixaban (ELIQUIS) tablet 5 mg  5 mg Oral BID  aspirin chewable tablet 81 mg  81 mg Oral DAILY  cholecalciferol (VITAMIN D3) (1000 Units /25 mcg) tablet 2 Tab  2,000 Units Oral DAILY  cyanocobalamin (VITAMIN B12) tablet 1,000 mcg  1,000 mcg Oral DAILY  DULoxetine (CYMBALTA) capsule 60 mg  60 mg Oral DAILY  ferrous sulfate tablet 325 mg  325 mg Oral DAILY WITH BREAKFAST  mirtazapine (REMERON) tablet 15 mg  15 mg Oral QHS  montelukast (SINGULAIR) tablet 10 mg  10 mg Oral QPM  
 pantoprazole (PROTONIX) tablet 40 mg  40 mg Oral ACB  sodium chloride (NS) flush 5-40 mL  5-40 mL IntraVENous Q8H  
 sodium chloride (NS) flush 5-40 mL  5-40 mL IntraVENous PRN  
 acetaminophen (TYLENOL) tablet 650 mg  650 mg Oral Q6H PRN Or  
 acetaminophen (TYLENOL) suppository 650 mg  650 mg Rectal Q6H PRN  polyethylene glycol (MIRALAX) packet 17 g  17 g Oral DAILY PRN  
 guaiFENesin-dextromethorphan (ROBITUSSIN DM) 100-10 mg/5 mL syrup 5 mL  5 mL Oral Q4H PRN Molly Eubanks NP

## 2021-01-28 NOTE — DIABETES MGMT
[address] [facility] 
 
FOLLOW-UP NOTE Presentation Alistair Ware is a 70 y.o. female admitted 1/26/21 with shortness of breath.  
  
HX:  
Past Medical History (click to expand or collapse) Past Medical History:  
Diagnosis Date  Asthma    
 Atrial fibrillation ([facility] [address] 11/16/2018  Autoimmune disease ([facility] [address]    
  fibromyalgia  CAD (coronary artery disease) 10/9/2015  Closed wedge compression fracture of T7 vertebra ([facility] [address] 11/13/2018  COPD (chronic obstructive pulmonary disease) ([facility]    
 Diabetes ([facility] [address]    
 DVT (deep vein thrombosis) in pregnancy    
 GERD (gastroesophageal reflux disease)    
 Heart failure ([facility])    
 History of vascular access device 09/30/2020  
  4f midline, 12cm at 1cm out placed in L Cephalic by Josefina Avitia RN  
 HTN (hypertension)    
 NSTEMI (non-ST elevated myocardial infarction) ([facility] [address] 10/9/2015  Obesity (BMI 30-39.9) 11/13/2018  PAD (peripheral artery disease) ([facility])    
  prior stenting  Sleep apnea    
  wears CPAP  
 Statin intolerance    
  
  
Current clinical course has been uncomplicated.  
  
Diabetes: Patient has known Type 2 diabetes, diagnosed in 2007. Home diabetes medication regimen is Onglyza 5mg daily. Admission  and A1c 5.5% indicate good diabetes control. Subjective Patient laying in bed, alert and oriented. Patient reports nausea, diarrhea and feeling \"shaky\" this morning. Objective Physical exam 
General Alert, oriented and in no acute distress. Conversant and cooperative. Vital Signs Visit Vitals BP (!) 103/41 (BP 1 Location: Right arm, BP Patient Position: At rest) Pulse 73 Temp 97.9 °F (36.6 °C) Resp 26 Wt 112.5 kg (248 lb) SpO2 95% BMI 38.84 kg/m² Skin  Warm and dry Heart   Regular rate and rhythm. No murmurs, rubs or gallops Lungs  Clear to auscultation without rales or rhonchi Extremities No foot wounds Laboratory Lab Results Component Value Date/Time Hemoglobin A1c 5.5 01/27/2021 01:58 AM  
 
Lab Results Component Value Date/Time LDL, calculated 68.8 10/10/2015 05:00 AM  
 
Lab Results Component Value Date/Time Creatinine (POC) 1.1 08/01/2019 11:41 AM  
 Creatinine 1.18 (H) 01/28/2021 04:55 AM  
 
Lab Results Component Value Date/Time Sodium 139 01/28/2021 04:55 AM  
 Potassium 3.3 (L) 01/28/2021 04:55 AM  
 Chloride 100 01/28/2021 04:55 AM  
 CO2 33 (H) 01/28/2021 04:55 AM  
 Anion gap 6 01/28/2021 04:55 AM  
 Glucose 126 (H) 01/28/2021 04:55 AM  
 BUN 26 (H) 01/28/2021 04:55 AM  
 Creatinine 1.18 (H) 01/28/2021 04:55 AM  
 BUN/Creatinine ratio 22 (H) 01/28/2021 04:55 AM  
 GFR est AA 54 (L) 01/28/2021 04:55 AM  
 GFR est non-AA 45 (L) 01/28/2021 04:55 AM  
 Calcium 7.4 (L) 01/28/2021 04:55 AM  
 Bilirubin, total 0.6 01/28/2021 04:55 AM  
 Alk. phosphatase 210 (H) 01/28/2021 04:55 AM  
 Protein, total 5.2 (L) 01/28/2021 04:55 AM  
 Albumin 2.1 (L) 01/28/2021 04:55 AM  
 Globulin 3.1 01/28/2021 04:55 AM  
 A-G Ratio 0.7 (L) 01/28/2021 04:55 AM  
 ALT (SGPT) 33 01/28/2021 04:55 AM  
 
Lab Results Component Value Date/Time ALT (SGPT) 33 01/28/2021 04:55 AM  
 
 
 
Factors affecting BG pattern Factor Dose Comments Nutrition: 
Carb-controlled meals   
60 grams/meal    
Drugs: 
Vasopressor load Steroids HIV Epogen Blood transfusion(s) Other: n/a   
n/a 
  
  
  
A1cs inaccurate A1cs inaccurate Pain 0/10    
Infection Zithromax, Rocephin PNA Other: n/a n/a    
 
 
Blood glucose pattern Evaluation This 70year old female, with Type 2 diabetes, did achieve diabetes control prior to admission (PTA), as evidenced by admission BG of 262 and A1c of 5.5%. Based on assessment of diabetes self-care practices, interventions that warrant action while hospitalized include: 
            [x]? Healthy Eating [x]?        Being Active [x]? Monitoring                                
  
  
The patient would also benefit from diabetes self-management education and support [facility]) after discharge. 
  
During this hospitalization, the patient has achieved inpatient blood glucose target of 100-180mg/dl. BG's have ranged  over the past 24 hours. Factors that have played a role include: 
[x]? Kidney dysfunction 
  
  
Basal insulin isn't in use.  
  
Bolus insulin isn't in use. Patient is eating meals. 
  
Corrective insulin is in use. Patient has not required any corrective insulin over the past 24 hours.   
  
To optimize BG control and support a positive health outcome, would utilize the Subcutaneous Insulin Order set (4115).   
Impression: It is suspected that corrective insulin alone will not be sufficient to maintain BG control at target. It is likely that patient will require low dose basal insulin to maintain BG at target. Assessment and Plan Nursing Diagnosis Risk for unstable blood glucose pattern Nursing Intervention Domain 5254 Decision-making Support Nursing Interventions Examined current inpatient diabetes control Explored factors facilitating and impeding inpatient management Recommendations Recommend: 
  
[x]? Use of Subcutaneous Insulin Order set (8217) Insulin Use Recommendation Basal                                      (Based on weight, BMI & GFR) Addresses basic metabolic needs If FBG is > 200 mg/dL, start Lantus 20 units daily Nutritional                                      (Based on CHO load) Addresses nutrition interventions If patient experiences pre-prandial BG > 200mg/dL, start Humalog 6 units with meals. Corrective                                       (Offset gaps in dosing) Useful in adjusting insulin dosing Correctional Scale for Normal Sensitivity 
  
200-249- 2units Humalog [phone] Humalog [phone] Humalog [phone] Humalog Over 400- 10units Humalog 
  
Do NOT hold for NPO; give in addition to meal time insulin dose. 
  
If patient does not eat, -give correction dose only.  
  
  
[x]? Referral to 
  
[x]? Diabetes Self-Management Training through [facility] (Phone [phone] to schedule appointment) Time Spent/ Billing Codes Total time spent with patient: 15 Minutes   I personally reviewed chart, notes, data and current medications in the medical record. I have personally examined and treated the patient at bedside during this period. [x] 06642 IP subsequent hospital care - 15 minutes Nina Llanos, CNS Diabetes Clinical Nurse Specialist 
[facility] Access via [employer]

## 2021-01-28 NOTE — PROGRESS NOTES
Name: Jeanette Fernandez  
: 1945 Admit Date: 2021 Phone: [phone]  Room: [facility]/ PCP: Annetta Mitchell MD  MRN: [id number] Date: 2021  Code: Full Code Chart and notes reviewed. Data reviewed. I review the patient's current medications in the medical record at each encounter. I have evaluated and examined the patient. HPI: 
 
1:42 PM    
 
History was obtained from patient, records review I was asked by Mayda Queen MD to see Francoise Zuniga in consultation for a chief complaint of SOB/PNA. History of Present Illness: 69 y/o F with PMH asthma/COPD (FEV1 51%)  on chronic prednisone 20mg daily, chronic hypoxemic respiratory failure (3L O2), HAYES on CPAP, afib, CAD, HFpEF, DM, DVT, PAD, CKD, GERD, obesity, fibromyalgia who was recently admitted -1/15/21 for anemia, GIB, asthma/COPD exacerbation. On the day of discharge her hgb was 7.9. She now presents from [facility] for low hgb and shortness of breath. Hgb on 21 was 6.5 Labs this admission: hgb 7.2, wbc 18, ddimer 1.77, Cr 1.42, lactic acid 1.07, bnp 1443, pct 0.29, crp 5.04, covid 19 rapid and pcr negative. Images: CXR 21 shows interstitial edema and stable increased density left lung. New infiltrate right lung. ROS: Tells me she has been short of breath since her prior admission but this has increased over the last few days. Left-sided chest pain for the last couple days which is constant and reproducible to palpation. She c/o dizziness for about a week. BLE swelling and redness is at her baseline. She denies f/c, cough, blood in the stool Currently on her baseline 3L O2 via nc Interval hx: 
She c/o abdominal pain and nausea Her breathing is doing well 
 
2L NC 
I/O -525 Wbc 17 
hgb 7.9 CXR today stable Past Medical History:  
Diagnosis Date  Asthma  Atrial fibrillation ([facility] 2018  [facility]   
 fibromyalgia  CAD (coronary artery disease) 10/9/2015  Closed wedge compression fracture of T7 vertebra ([facility] 11/13/2018  COPD (chronic obstructive pulmonary disease) ([facility])  Diabetes ([facility]  DVT (deep vein thrombosis) in pregnancy  GERD (gastroesophageal reflux disease)  Heart failure ([facility]  History of vascular access device 09/30/2020  
 4f midline, 12cm at 1cm out placed in L Cephalic by Samira Nixon RN  
 HTN (hypertension)  NSTEMI (non-ST elevated myocardial infarction) ([facility] 10/9/2015  Obesity (BMI 30-39.9) 11/13/2018  PAD (peripheral artery disease) ([facility] prior stenting  Sleep apnea   
 wears CPAP  
 Statin intolerance Past Surgical History:  
Procedure Laterality Date  COLONOSCOPY N/A 1/4/2021 COLONOSCOPY performed by Cha Washburn MD at [address] [facility] [address]  HX COLOSTOMY    
 and reversal, post episiotomy repair [facility]  HX UROLOGICAL  2009  
 bladder sling  IR KYPHOPLASTY LUMBAR  10/8/2020  IN ABDOMEN SURGERY PROC UNLISTED    
 hital hernia repair, gall bladder removed  IN AMPUTATION TRANSMETACARPAL Right 06/12/2019  
 entire ring finger, 2nd & 3rd fingers (transmetacarpal)  IN BREAST SURGERY PROCEDURE UNLISTED    
 lumpectomy  IN CARDIAC SURG PROCEDURE UNLIST  10/9/15  
 2 stents Family History Problem Relation Age of Onset  Diabetes Mother  Heart Failure Mother  Kidney Disease Mother  Diabetes Father  Heart Disease Father  Elevated Lipids Father  Heart Failure Father  Heart Disease Brother  Cancer Brother   
     kidney  Diabetes Brother  No Known Problems Brother  No Known Problems Brother Social History Tobacco Use  Smoking status: Former Smoker Quit date: 3/30/2017 Years since quitting: 3.8  Smokeless tobacco: Never Used Substance Use Topics  Alcohol use:  No  
 Alcohol/week: 0.0 standard drinks Comment: very very rarely Allergies Allergen Reactions  Advair Diskus [Fluticasone Propion-Salmeterol] Rash  Aspartame Other (comments)  Breo Ellipta [Fluticasone Furoate-Vilanterol] Rash  Bumex [Bumetanide] Myalgia  Ciprofloxacin Rash  Statins-Hmg-Coa Reductase Inhibitors Other (comments) Muscle pain Lipitor/crestor/zocor  Sulfa (Sulfonamide Antibiotics) Rash  Tetracycline Other (comments) musclepain  Torsemide Rash and Myalgia  Zetia [Ezetimibe] Myalgia Current Facility-Administered Medications Medication Dose Route Frequency  nystatin (MYCOSTATIN) 100,000 unit/mL oral suspension 500,000 Units  500,000 Units Oral QID  potassium chloride SR (KLOR-CON 10) tablet 20 mEq  20 mEq Oral BID  [START ON 1/29/2021] amiodarone (CORDARONE) tablet 200 mg  200 mg Oral DAILY  [START ON 1/29/2021] dilTIAZem ER (CARDIZEM CD) capsule 300 mg  300 mg Oral DAILY  glucose chewable tablet 16 g  4 Tab Oral PRN  
 dextrose (D50W) injection syrg 12.5-25 g  25-50 mL IntraVENous PRN  
 glucagon (GLUCAGEN) injection 1 mg  1 mg IntraMUSCular PRN  
 insulin lispro (HUMALOG) injection   SubCUTAneous AC&HS  cefTRIAXone (ROCEPHIN) 1 g in sterile water (preservative free) 10 mL IV syringe  1 g IntraVENous Q24H  
 azithromycin (ZITHROMAX) 500 mg in 0.9% sodium chloride 250 mL (VIAL-MATE)  500 mg IntraVENous Q24H  
 0.9% sodium chloride infusion 250 mL  250 mL IntraVENous PRN  
 albuterol-ipratropium (DUO-NEB) 2.5 MG-0.5 MG/3 ML  3 mL Nebulization Q6H RT  
 arformoteroL (BROVANA) neb solution 15 mcg  15 mcg Nebulization BID RT  
 predniSONE (DELTASONE) tablet 20 mg  20 mg Oral DAILY WITH BREAKFAST  ondansetron (ZOFRAN) injection 4 mg  4 mg IntraVENous Q4H PRN  
 cyanocobalamin (VITAMIN B12) injection 1,000 mcg  1,000 mcg IntraMUSCular DAILY  lipase-protease-amylase (CREON 24,000) capsule 2 Cap  2 Cap Oral TID WITH MEALS  
  albuterol (PROVENTIL HFA, VENTOLIN HFA, PROAIR HFA) inhaler 2 Puff  2 Puff Inhalation Q4H PRN  
 apixaban (ELIQUIS) tablet 5 mg  5 mg Oral BID  aspirin chewable tablet 81 mg  81 mg Oral DAILY  cholecalciferol (VITAMIN D3) (1000 Units /25 mcg) tablet 2 Tab  2,000 Units Oral DAILY  cyanocobalamin (VITAMIN B12) tablet 1,000 mcg  1,000 mcg Oral DAILY  DULoxetine (CYMBALTA) capsule 60 mg  60 mg Oral DAILY  ferrous sulfate tablet 325 mg  325 mg Oral DAILY WITH BREAKFAST  mirtazapine (REMERON) tablet 15 mg  15 mg Oral QHS  montelukast (SINGULAIR) tablet 10 mg  10 mg Oral QPM  
 pantoprazole (PROTONIX) tablet 40 mg  40 mg Oral ACB  sodium chloride (NS) flush 5-40 mL  5-40 mL IntraVENous Q8H  
 sodium chloride (NS) flush 5-40 mL  5-40 mL IntraVENous PRN  
 acetaminophen (TYLENOL) tablet 650 mg  650 mg Oral Q6H PRN Or  
 acetaminophen (TYLENOL) suppository 650 mg  650 mg Rectal Q6H PRN  polyethylene glycol (MIRALAX) packet 17 g  17 g Oral DAILY PRN  
 guaiFENesin-dextromethorphan (ROBITUSSIN DM) 100-10 mg/5 mL syrup 5 mL  5 mL Oral Q4H PRN  
 
 
 
REVIEW OF SYSTEMS  
12 point ROS negative except as stated in the HPI. Physical Exam:  
Visit Vitals BP (!) 103/41 (BP 1 Location: Right arm, BP Patient Position: At rest) Pulse 73 Temp 97.9 °F (36.6 °C) Resp 26 Wt 112.5 kg (248 lb) SpO2 95% BMI 38.84 kg/m² General:  Alert, cooperative, no distress, appears stated age. Head:  Normocephalic, without obvious abnormality, atraumatic. Eyes:  Conjunctivae/corneas clear. Nose: Nares normal. Septum midline. Mucosa normal.   
Throat: Lips, mucosa, and tongue normal.   
Neck: Supple, symmetrical, trachea midline Lungs:   Bilateral crackles. Chest wall:  Normal shape Heart:  Irregular rhythm. +murmur Abdomen:   Soft, non-tender. Bowel sounds normal.  
Extremities: BLE 3+ edema. Right foot cyanotic and cool, normal capillary refill. Extremities atraumatic Pulses: 2+ and symmetric all extremities. Skin: BLE erythema, LLE warmth Lymph nodes: Cervical, supraclavicular nodes normal.  
Neurologic: Grossly nonfocal  
 
 
Lab Results Component Value Date/Time Sodium 139 01/28/2021 04:55 AM  
 Potassium 3.3 (L) 01/28/2021 04:55 AM  
 Chloride 100 01/28/2021 04:55 AM  
 CO2 33 (H) 01/28/2021 04:55 AM  
 BUN 26 (H) 01/28/2021 04:55 AM  
 Creatinine 1.18 (H) 01/28/2021 04:55 AM  
 Glucose 126 (H) 01/28/2021 04:55 AM  
 Calcium 7.4 (L) 01/28/2021 04:55 AM  
 Magnesium 2.5 (H) 01/28/2021 04:55 AM  
 Phosphorus 2.2 (L) 01/03/2021 01:59 AM  
 Lactic acid 1.0 01/27/2021 01:59 AM  
 
 
Lab Results Component Value Date/Time WBC 17.0 (H) 01/28/2021 04:55 AM  
 HGB 7.9 (L) 01/28/2021 04:55 AM  
 PLATELET 363 71/85/8700 04:55 AM  
 MCV 98.2 01/28/2021 04:55 AM  
 
 
Lab Results Component Value Date/Time INR 1.1 01/27/2021 01:58 AM  
 aPTT 25.3 01/27/2021 01:58 AM  
 Alk. phosphatase 210 (H) 01/28/2021 04:55 AM  
 Protein, total 5.2 (L) 01/28/2021 04:55 AM  
 Albumin 2.1 (L) 01/28/2021 04:55 AM  
 Globulin 3.1 01/28/2021 04:55 AM  
 
 
Lab Results Component Value Date/Time Iron 16 (L) 09/29/2020 08:27 PM  
 TIBC 305 09/29/2020 08:27 PM  
 Iron % saturation 5 (L) 09/29/2020 08:27 PM  
 Ferritin 69 01/27/2021 01:58 AM  
 
 
Lab Results Component Value Date/Time C-Reactive protein 5.04 (H) 01/27/2021 01:58 AM  
 TSH 3.40 01/13/2021 11:29 AM  
  
 
No results found for: PH, PHI, PCO2, PCO2I, PO2, PO2I, HCO3, HCO3I, FIO2, FIO2I Lab Results Component Value Date/Time CK 18 (L) 05/21/2019 06:13 AM  
 CK-MB Index 4.4 (H) 11/13/2018 03:26 PM  
 Troponin-I, Qt. <0.05 01/27/2021 01:58 AM  
  
 
Lab Results Component Value Date/Time Culture result: NO GROWTH 5 DAYS 01/01/2021 06:19 PM  
 Culture result: NO GROWTH 5 DAYS 11/05/2020 02:32 PM  
 Culture result: LIGHT MIXED SKIN NUBIA ISOLATED 11/02/2020 07:25 PM  
 
 
Lab Results Component Value Date/Time Hepatitis B surface Ag <0.10 05/30/2020 12:31 AM  
 
 
Lab Results Component Value Date/Time Vancomycin,trough 37.6 (HH) 05/19/2019 08:11 AM  
 CK 18 (L) 05/21/2019 06:13 AM  
 CK 25 (L) 11/13/2018 03:26 PM  
 
 
Lab Results Component Value Date/Time Color YELLOW/STRAW 01/01/2021 12:20 AM  
 Appearance CLEAR 01/01/2021 12:20 AM  
 Specific gravity 1.010 01/06/2019 04:42 AM  
 pH (UA) 5.5 01/01/2021 12:20 AM  
 Protein Negative 01/01/2021 12:20 AM  
 Glucose Negative 01/01/2021 12:20 AM  
 Ketone Negative 01/01/2021 12:20 AM  
 Bilirubin Negative 01/01/2021 12:20 AM  
 Blood Negative 01/01/2021 12:20 AM  
 Urobilinogen 0.2 01/01/2021 12:20 AM  
 Nitrites Positive (A) 01/01/2021 12:20 AM  
 Leukocyte Esterase Negative 01/01/2021 12:20 AM  
 WBC 0-4 01/01/2021 12:20 AM  
 RBC 0-5 01/01/2021 12:20 AM  
 Bacteria 1+ (A) 01/01/2021 12:20 AM  
 
 
IMPRESSION 
· Anemia · Shortness of breath--improving · Abnormal cxr: shows interstitial edema and stable increased density left lung. New infiltrate right lung which may represent PNA vs. Edema · Cellulitis · NAIDA on CKD · Asthma, COPD not in acute exacerbation. On chronic prednisone 20mg daily · Chronic hypoxemic respiratory failure · afib on Eliquis · HFpEF 
· CAD 
· HAYES on CPAP 
· H/o DVT · PAD · GERD 
 
PLAN 
· Cont supp o2 for goal sats>88%-- on baseline 3L · Start IV lasix, monitor I/Os, BP 
· Cards c/s pending · GI following · ceftriaxone, azithro · home prednisone 20mg daily · scheduled combivent and pulmicort nebs in place of home spiriva/dulera · singulair · CPAP qhs 
 
 
Ramond Pair.  Henrry Olea [place]

## 2021-01-28 NOTE — PROGRESS NOTES
Daily Progress Note: 1/28/2021 April Rancho Trisatn MD 
 
Assessment/Plan: 1. Pneumonia  
     - admit to remote telemetry unit with h/o paroxysmal afib 
     - continue IV antibiotics Azithromycin 500 mg IV q 24 hours and Ceftriaxone 1 gram IV q 24 hours 2. Shortness of breath (SOB) - with history of chronic hypoxic respiratory failure on 3 liters chronically - provide supplemental oxygen and continuous pulse oximetry monitoring 
  
3. Acute on chronic diastolic CHF (HFpEF) - diuresis per Cardiology - place on strict Is & Os/ daily weights to monitor fluid status closely 
     - last 2d echocardiogram 9/30/2020: LVEF = 60% - 65% 
     - cardiology consulted 
  
4. Suspected COVID 19 virus infection 
     - rapid COVID 19 test is negative  
     - order Vitamin C, D, and Zinc 
     - Dexamethasone 6 mg q 24 hours - wean off 
  
5.  Leukocytosis 
     - monitor, antibiotics as above 
  
6. Anemia 
     - transfuse for hemoglobin < 7.0. 
     - repeat H/H 
     - recheck iron profile, B12, folate levels 
  
7. Uncontrolled type 2 DM with hyperglycemia - Humalog insulin correctional coverage, scheduled blood glucose checks and check hemoglobin A1c level. 
  
8. Acute superimposed on chronic kidney disease stage 3 
     - monitor and tx as needed 9. Elevated alkaline phosphatase 
     - monitor and treat as needed 
  
10. Hypertension 
       - hold on home Lisinopril - HCTZ with elevated creatinine and soft BPs - restart as able. 
  
11. History of GI bleed - s/p upper GI endoscopy and colonoscopy on 1/4/2021 with Adenomatous polyp of transverse colon [D12.3] Candida esophagitis ([facility] [B37.81] Gastritis and duodenitis [  
  - GI consulted 12. Obesity 
       - would recommend eventual weight loss, heart healthy diet, and lifestyle modification 13. GERD 
       - Protonix 
  
14. Paroxysmal atrial fibrillation (afib) - on long term anticoagulation on Eliquis 
  
VTE prophylaxis:  Plan as above 
  
CODE STATUS:  FULL CODE as discussed with patient who requests the same. Problem List: 
Problem List as of 1/28/2021 Date Reviewed: 11/2/2020 Codes Class Noted - Resolved Pneumonia ICD-10-CM: J18.9 ICD-9-CM: 922  1/26/2021 - Present SOB (shortness of breath) ICD-10-CM: R06.02 
ICD-9-CM: 786.05  1/26/2021 - Present Anemia ICD-10-CM: D64.9 ICD-9-CM: 285.9  1/26/2021 - Present Acute on chronic systolic CHF (congestive heart failure) (HCC) ICD-10-CM: W31.50 ICD-9-CM: 428.23, 428.0  1/26/2021 - Present Uncontrolled type 2 diabetes mellitus with hyperglycemia (HCC) ICD-10-CM: E11.65 ICD-9-CM: 250.02  1/26/2021 - Present Left lower lobe pneumonia ICD-10-CM: J18.9 ICD-9-CM: 844  1/1/2021 - Present Leukocytosis ICD-10-CM: I00.807 ICD-9-CM: 288.60  11/3/2020 - Present Chronic respiratory failure with hypoxia [facility]) ICD-10-CM: J96.11 
ICD-9-CM: 518.83, 799.02  11/2/2020 - Present Overview Signed 11/2/2020 10:12 PM by Jaya Pettit MD  
  On 3L NC at home Cellulitis of lower extremity ICD-10-CM: L03.119 ICD-9-CM: 682.6  3/23/2020 - Present ASO (arteriosclerosis obliterans) ICD-10-CM: I70.90 ICD-9-CM: 440.9  9/5/2019 - Present PVD (peripheral vascular disease) ([facility] ICD-10-CM: I73.9 ICD-9-CM: 443.9  8/1/2019 - Present Severe obesity ([facility] ICD-10-CM: E66.01 
ICD-9-CM: 278.01  7/30/2019 - Present COPD (chronic obstructive pulmonary disease) (HCC) ICD-10-CM: J44.9 ICD-9-CM: 519  7/13/2019 - Present Normocytic anemia ICD-10-CM: D64.9 ICD-9-CM: 285.9  7/13/2019 - Present PAD (peripheral artery disease) (HCC) ICD-10-CM: I73.9 ICD-9-CM: 443.9  7/13/2019 - Present Mild intermittent asthma ICD-10-CM: J45.20 ICD-9-CM: 493.90  5/17/2019 - Present Brachial artery thrombus (HCC) ICD-10-CM: Y91.1 ICD-9-CM: 444.21  4/26/2019 - Present Sleep apnea ICD-10-CM: G47.30 ICD-9-CM: 780.57  1/5/2019 - Present Overview Signed 1/5/2019  8:41 PM by Diana Edmondson, DO  
  wears CPAP Atrial fibrillation [facility]) ICD-10-CM: I48.91 
ICD-9-CM: 427.31  11/16/2018 - Present Obesity (BMI 30-39.9) (Chronic) ICD-10-CM: B02.3 ICD-9-CM: 278.00  11/13/2018 - Present Recurrent deep vein thrombosis (DVT) (HCC) ICD-10-CM: I82.409 ICD-9-CM: 453.40  3/30/2018 - Present Chronic anticoagulation (Chronic) ICD-10-CM: Z79.01 
ICD-9-CM: V58.61  3/30/2018 - Present Essential hypertension (Chronic) ICD-10-CM: I10 
ICD-9-CM: 401.9  1/22/2016 - Present CAD (coronary artery disease) ICD-10-CM: I25.10 ICD-9-CM: 414.00  10/9/2015 - Present Type II diabetes mellitus (HCC) (Chronic) ICD-10-CM: E11.9 ICD-9-CM: 250.00  10/9/2015 - Present RESOLVED: Syncope and collapse ICD-10-CM: R55 
ICD-9-CM: 780.2  9/29/2020 - 11/2/2020 RESOLVED: Cellulitis ICD-10-CM: L03.90 ICD-9-CM: 682.9  3/23/2020 - 5/29/2020 RESOLVED: Hypokalemia ICD-10-CM: E87.6 ICD-9-CM: 276.8  3/23/2020 - 5/29/2020 RESOLVED: Hyponatremia ICD-10-CM: E87.1 ICD-9-CM: 276.1  3/23/2020 - 5/29/2020 RESOLVED: Diarrhea ICD-10-CM: R19.7 ICD-9-CM: 787.91  3/23/2020 - 5/29/2020 RESOLVED: Syncope and collapse ICD-10-CM: R55 
ICD-9-CM: 780.2  7/13/2019 - 5/29/2020 RESOLVED: UTI (urinary tract infection) ICD-10-CM: N39.0 ICD-9-CM: 599.0  7/13/2019 - 11/2/2020 RESOLVED: Sepsis ([address] ICD-10-CM: A41.9 ICD-9-CM: 038.9, 995.91  7/13/2019 - 11/3/2020 RESOLVED: Leg wound, left ICD-10-CM: B24.587I ICD-9-CM: 891.0  7/13/2019 - 5/29/2020 RESOLVED: Leg laceration, right, initial encounter ICD-10-CM: V23.543P ICD-9-CM: 894.0  7/13/2019 - 5/29/2020 RESOLVED: Cellulitis of right upper arm ICD-10-CM: L03.113 ICD-9-CM: 682.3  5/17/2019 - 5/29/2020 RESOLVED: Gangrene of finger of right hand ([facility] ICD-10-CM: L14 
ICD-9-CM: 785.4  5/17/2019 - 11/2/2020 RESOLVED: Bowel obstruction ([facility] ICD-10-CM: M01.547 ICD-9-CM: 560.9  1/8/2019 - 5/29/2020 RESOLVED: Partial small bowel obstruction ([facility] ICD-10-CM: K56.600 ICD-9-CM: 560.9  1/5/2019 - 5/29/2020 RESOLVED: Dyspnea ICD-10-CM: R06.00 
ICD-9-CM: 786.09  11/13/2018 - 5/29/2020 RESOLVED: ARF (acute renal failure) ([facility]) ICD-10-CM: N17.9 ICD-9-CM: 584.9  11/13/2018 - 5/29/2020 RESOLVED: Closed wedge compression fracture of T7 vertebra ([facility] ICD-10-CM: N34.813O ICD-9-CM: 805.2  11/13/2018 - 5/29/2020 RESOLVED: Type 2 diabetes with nephropathy ([facility] ICD-10-CM: E11.21 
ICD-9-CM: 250.40, 583.81  10/1/2018 - 11/2/2020 RESOLVED: Chest pain ICD-10-CM: R07.9 ICD-9-CM: 786.50  6/27/2018 - 5/29/2020 RESOLVED: Abdominal pain ICD-10-CM: R10.9 ICD-9-CM: 789.00  6/27/2018 - 5/29/2020 RESOLVED: Coronary artery disease involving native coronary artery without angina pectoris (Chronic) ICD-10-CM: I25.10 ICD-9-CM: 414.01  1/22/2016 - 11/2/2020 RESOLVED: HTN (hypertension) ICD-10-CM: I10 
ICD-9-CM: 401.9  10/9/2015 - 1/22/2016 RESOLVED: NSTEMI (non-ST elevated myocardial infarction) ([facility] ICD-10-CM: I21.4 ICD-9-CM: 410.70  10/9/2015 - 5/29/2020 Subjective: 70 y.o. female with past medical history of chronic hypoxic respiratory failure (on 3 liters home oxygen), asthma, atrial fibrillation (Afib), autoimmune disease, fibromyalgia, CAD, heart failure (HFpEF), T7 vertebral compression fracture, COPD, type 2 DM, DVT, GERD, hypertension, NSTEMI, PAD, sleep apnea, obesity presented to the ED from home with chief complaints of shortness of breath (SOB) and low hemoglobin. Patient has chronic SOB but worsened. She was last hospitalized here from 1/1/2021 - 1/15/2021 with left lower lobe pneumonia, acute on chronic hypoxic respiratory failure, sepsis, GI bleed, anemia. Patient was transfused with 2 units PRBCs with hemoglobin = 4.9 on 1/1/2021. Last Hgb= 6.5 on 1/21/2021. On arrival in the ED today, initial recorded vital signs were BP = 139/47, HR= 82, RR= 22, O2sat= 94% on 4 liters O2 nasal cannula (NC). WBC = 18,400. proBNP = 1,443. Chest xray portable showed cardiomegaly, interstitial edema, and density in left lung base. Patient is now seen for admission to the hospitalist service for continued evaluation and treatments. There were no reports of new onset syncope, loss of consciousness, headache, neck pain, visual disturbance, numbness, paresthesias, focal weakness, chest pain, palpitations, abdominal pain, nausea, vomiting, diarrhea, melena, dysuria, hematuria, calf pain, increased leg swelling/ edema, fever, chills, rash, or known COVID 19 exposure. (Dr Junior Mccarthy) 1/27:  Feeling better with less shortness of breath. Hb low so needs another transfusion. No clear source of bleeding. Covid negative. GI to see. 1/28:  Feels some better except sore throat - looks like thrush. Less shortness of breath. Hb up to 7.9 this AM after 1 unit PRC. K+ sl low - replacing. Review of Systems: A comprehensive review of systems was negative except for that written in the HPI. Objective:  
Physical Exam:  
 
Visit Vitals BP (!) 132/49 (BP 1 Location: Right arm, BP Patient Position: At rest)  
Pulse 65  
Temp 97.5 °F (36.4 °C)  
Resp 20  
Wt 112.5 kg (248 lb)  
SpO2 96%  
BMI 38.84 kg/m²  
 O2 Flow Rate (L/min): 2 l/min O2 Device: Nasal cannula 
 
Temp (24hrs), Av.6 °F (36.4 °C), Min:97 °F (36.1 °C), Max:98.2 °F (36.8 °C) 
  No intake/output data recorded.    190 -  0700 
In: 75 [P.O.:75] 
Out: 600 [Urine:600] 
General:  Alert, obese, cooperative, no distress, appears stated age.  
Head:  Normocephalic, without obvious abnormality, atraumatic.  
Eyes:  Conjunctivae/corneas clear.  EOMs intact.  
Throat: Lips, mucosa, and tongue moist; throat red with a few white patches on tongue.  
Neck: Supple, symmetrical, trachea midline, no adenopathy, thyroid: no enlargement/tenderness/nodules, no carotid bruit and no JVD.  
Lungs:   Clear to auscultation bilaterally.  
Chest wall:  No tenderness or deformity.  
Heart:  irreg with rate in 80s, no murmur, click, rub or gallop.  
Abdomen:   Soft, non-tender. Bowel sounds normal. No masses,  No organomegaly.  
Extremities: no cyanosis.  2+ LE edema on top of chronic stasis changes. No calf tenderness or cords.    
Pulses: 2+ and symmetric all extremities.  
Skin: Skin color, texture, turgor normal. No rashes  
Neurologic: CNII-XII intact.  Alert and oriented X 3.  Fine motor of hands and fingers normal.   equal.  No cogwheeling or rigidity.  Gait not tested at this time.  Sensation grossly normal to touch.  Gross motor of extremities normal except generalized weakness.     
 
Data Review:  
   
Recent Days: 
Recent Labs  
  21 
0455 21 
0158 21 
1513  
WBC 17.0* 18.4* 18.4*  
HGB 7.9* 6.8* 7.2*  
HCT 27.5* 23.8* 25.5*  
 369 354  
 
Recent Labs  
  21 
0455 21 
0158 21 
1513  
  --  139  
K 3.3*  --  3.7  
  --  100  
CO2 33*  --  36*  
*  --  262*  
BUN 26*  --  28*  
CREA 1.18*  --  1.42*  
CA 7.4*  --  7.9*  
 MG 2.5* 2.3  --   
ALB 2.1*  --  2.2* TBILI 0.6  --  0.6 ALT 33  --  38 INR  --  1.1  -- No results for input(s): PH, PCO2, PO2, HCO3, FIO2 in the last 72 hours. 24 Hour Results: 
Recent Results (from the past 24 hour(s)) GLUCOSE, POC Collection Time: 01/27/21  7:57 AM  
Result Value Ref Range Glucose (POC) 89 65 - 100 mg/dL Performed by Roxie Gibson GLUCOSE, POC Collection Time: 01/27/21 11:11 AM  
Result Value Ref Range Glucose (POC) 117 (H) 65 - 100 mg/dL Performed by Francetta Favre RBC, ALLOCATE Collection Time: 01/27/21  2:45 PM  
Result Value Ref Range HISTORY CHECKED? Historical check performed GLUCOSE, POC Collection Time: 01/27/21  4:35 PM  
Result Value Ref Range Glucose (POC) 103 (H) 65 - 100 mg/dL Performed by Roly Blankenship (PCT) GLUCOSE, POC Collection Time: 01/27/21  8:35 PM  
Result Value Ref Range Glucose (POC) 70 65 - 100 mg/dL Performed by Dianna Dimas (PCT) CBC WITH AUTOMATED DIFF Collection Time: 01/28/21  4:55 AM  
Result Value Ref Range WBC 17.0 (H) 3.6 - 11.0 K/uL  
 RBC 2.80 (L) 3.80 - 5.20 M/uL HGB 7.9 (L) 11.5 - 16.0 g/dL HCT 27.5 (L) 35.0 - 47.0 % MCV 98.2 80.0 - 99.0 FL  
 MCH 28.2 26.0 - 34.0 PG  
 MCHC 28.7 (L) 30.0 - 36.5 g/dL  
 RDW 18.5 (H) 11.5 - 14.5 % PLATELET 084 544 - 288 K/uL MPV 10.5 8.9 - 12.9 FL  
 NRBC 0.5 (H) 0  WBC ABSOLUTE NRBC 0.08 (H) 0.00 - 0.01 K/uL NEUTROPHILS PENDING % LYMPHOCYTES PENDING % MONOCYTES PENDING % EOSINOPHILS PENDING % BASOPHILS PENDING % IMMATURE GRANULOCYTES PENDING %  
 ABS. NEUTROPHILS PENDING K/UL  
 ABS. LYMPHOCYTES PENDING K/UL  
 ABS. MONOCYTES PENDING K/UL  
 ABS. EOSINOPHILS PENDING K/UL  
 ABS. BASOPHILS PENDING K/UL  
 ABS. IMM. GRANS. PENDING K/UL  
 DF PENDING   
METABOLIC PANEL, COMPREHENSIVE Collection Time: 01/28/21  4:55 AM  
Result Value Ref Range  Sodium 139 136 - 145 mmol/L  
  Potassium 3.3 (L) 3.5 - 5.1 mmol/L  
 Chloride 100 97 - 108 mmol/L  
 CO2 33 (H) 21 - 32 mmol/L  
 Anion gap 6 5 - 15 mmol/L  
 Glucose 126 (H) 65 - 100 mg/dL  
 BUN 26 (H) 6 - 20 MG/DL  
 Creatinine 1.18 (H) 0.55 - 1.02 MG/DL  
 BUN/Creatinine ratio 22 (H) 12 - 20    
 GFR est AA 54 (L) >60 ml/min/1.73m2  
 GFR est non-AA 45 (L) >60 ml/min/1.73m2  
 Calcium 7.4 (L) 8.5 - 10.1 MG/DL  
 Bilirubin, total 0.6 0.2 - 1.0 MG/DL  
 ALT (SGPT) 33 12 - 78 U/L  
 AST (SGOT) 25 15 - 37 U/L  
 Alk. phosphatase 210 (H) 45 - 117 U/L  
 Protein, total 5.2 (L) 6.4 - 8.2 g/dL  
 Albumin 2.1 (L) 3.5 - 5.0 g/dL  
 Globulin 3.1 2.0 - 4.0 g/dL  
 A-G Ratio 0.7 (L) 1.1 - 2.2    
MAGNESIUM  
 Collection Time: 01/28/21  4:55 AM  
Result Value Ref Range  
 Magnesium 2.5 (H) 1.6 - 2.4 mg/dL  
 
 
Medications reviewed 
Current Facility-Administered Medications  
Medication Dose Route Frequency  
• glucose chewable tablet 16 g  4 Tab Oral PRN  
• dextrose (D50W) injection syrg 12.5-25 g  25-50 mL IntraVENous PRN  
• glucagon (GLUCAGEN) injection 1 mg  1 mg IntraMUSCular PRN  
• insulin lispro (HUMALOG) injection   SubCUTAneous AC&HS  
• cefTRIAXone (ROCEPHIN) 1 g in sterile water (preservative free) 10 mL IV syringe  1 g IntraVENous Q24H  
• azithromycin (ZITHROMAX) 500 mg in 0.9% sodium chloride 250 mL (VIAL-MATE)  500 mg IntraVENous Q24H  
• 0.9% sodium chloride infusion 250 mL  250 mL IntraVENous PRN  
• albuterol-ipratropium (DUO-NEB) 2.5 MG-0.5 MG/3 ML  3 mL Nebulization Q6H RT  
• arformoteroL (BROVANA) neb solution 15 mcg  15 mcg Nebulization BID RT  
• predniSONE (DELTASONE) tablet 20 mg  20 mg Oral DAILY WITH BREAKFAST  
• ondansetron (ZOFRAN) injection 4 mg  4 mg IntraVENous Q4H PRN  
• cyanocobalamin (VITAMIN B12) injection 1,000 mcg  1,000 mcg IntraMUSCular DAILY  
• lipase-protease-amylase (CREON 24,000) capsule 2 Cap  2 Cap Oral TID WITH MEALS  
  albuterol (PROVENTIL HFA, VENTOLIN HFA, PROAIR HFA) inhaler 2 Puff  2 Puff Inhalation Q4H PRN  
 amiodarone (CORDARONE) tablet 400 mg  400 mg Oral BID WITH MEALS  
 apixaban (ELIQUIS) tablet 5 mg  5 mg Oral BID  aspirin chewable tablet 81 mg  81 mg Oral DAILY  cholecalciferol (VITAMIN D3) (1000 Units /25 mcg) tablet 2 Tab  2,000 Units Oral DAILY  cyanocobalamin (VITAMIN B12) tablet 1,000 mcg  1,000 mcg Oral DAILY  dilTIAZem ER (CARDIZEM CD) capsule 360 mg  360 mg Oral DAILY  DULoxetine (CYMBALTA) capsule 60 mg  60 mg Oral DAILY  ferrous sulfate tablet 325 mg  325 mg Oral DAILY WITH BREAKFAST  mirtazapine (REMERON) tablet 15 mg  15 mg Oral QHS  montelukast (SINGULAIR) tablet 10 mg  10 mg Oral QPM  
 pantoprazole (PROTONIX) tablet 40 mg  40 mg Oral ACB  sodium chloride (NS) flush 5-40 mL  5-40 mL IntraVENous Q8H  
 sodium chloride (NS) flush 5-40 mL  5-40 mL IntraVENous PRN  
 acetaminophen (TYLENOL) tablet 650 mg  650 mg Oral Q6H PRN Or  
 acetaminophen (TYLENOL) suppository 650 mg  650 mg Rectal Q6H PRN  polyethylene glycol (MIRALAX) packet 17 g  17 g Oral DAILY PRN  
 guaiFENesin-dextromethorphan (ROBITUSSIN DM) 100-10 mg/5 mL syrup 5 mL  5 mL Oral Q4H PRN Care Plan discussed with: Patient/GI/Cardiology and Nurse Total time spent with patient: 30 minutes.  
Doroteo Bain MD

## 2021-01-28 NOTE — PROGRESS NOTES
0700: Bedside shift change report given to [address] [place] [address] (oncoming nurse) by Ania Chester (offgoing nurse). Report included the following information SBAR, Kardex, Procedure Summary, Intake/Output, MAR, Accordion and Cardiac Rhythm NSr.  
 
0815: Patients BP low at 103/41 with a MAP of 55 and HR of 73. Dr. Aure Martinez notified and orders to put in consult to cardiology. No other new orders at this time. 1125: Patients BP low at 111/44 with a map at 62. Dr. Aure Martinez notified and is ok with this blood pressure.

## 2021-01-28 NOTE — PROGRESS NOTES
Gastrointestinal Progress Note 1/28/2021 Admit Date: 1/26/2021 Subjective: No appetite; had a bowel movement last night Current Facility-Administered Medications Medication Dose Route Frequency  glucose chewable tablet 16 g  4 Tab Oral PRN  
 dextrose (D50W) injection syrg 12.5-25 g  25-50 mL IntraVENous PRN  
 glucagon (GLUCAGEN) injection 1 mg  1 mg IntraMUSCular PRN  
 insulin lispro (HUMALOG) injection   SubCUTAneous AC&HS  cefTRIAXone (ROCEPHIN) 1 g in sterile water (preservative free) 10 mL IV syringe  1 g IntraVENous Q24H  
 azithromycin (ZITHROMAX) 500 mg in 0.9% sodium chloride 250 mL (VIAL-MATE)  500 mg IntraVENous Q24H  
 0.9% sodium chloride infusion 250 mL  250 mL IntraVENous PRN  
 albuterol-ipratropium (DUO-NEB) 2.5 MG-0.5 MG/3 ML  3 mL Nebulization Q6H RT  
 arformoteroL (BROVANA) neb solution 15 mcg  15 mcg Nebulization BID RT  
 predniSONE (DELTASONE) tablet 20 mg  20 mg Oral DAILY WITH BREAKFAST  ondansetron (ZOFRAN) injection 4 mg  4 mg IntraVENous Q4H PRN  
 cyanocobalamin (VITAMIN B12) injection 1,000 mcg  1,000 mcg IntraMUSCular DAILY  lipase-protease-amylase (CREON 24,000) capsule 2 Cap  2 Cap Oral TID WITH MEALS  
 albuterol (PROVENTIL HFA, VENTOLIN HFA, PROAIR HFA) inhaler 2 Puff  2 Puff Inhalation Q4H PRN  
 amiodarone (CORDARONE) tablet 400 mg  400 mg Oral BID WITH MEALS  
 apixaban (ELIQUIS) tablet 5 mg  5 mg Oral BID  aspirin chewable tablet 81 mg  81 mg Oral DAILY  cholecalciferol (VITAMIN D3) (1000 Units /25 mcg) tablet 2 Tab  2,000 Units Oral DAILY  cyanocobalamin (VITAMIN B12) tablet 1,000 mcg  1,000 mcg Oral DAILY  dilTIAZem ER (CARDIZEM CD) capsule 360 mg  360 mg Oral DAILY  DULoxetine (CYMBALTA) capsule 60 mg  60 mg Oral DAILY  ferrous sulfate tablet 325 mg  325 mg Oral DAILY WITH BREAKFAST  mirtazapine (REMERON) tablet 15 mg  15 mg Oral QHS  montelukast (SINGULAIR) tablet 10 mg  10 mg Oral QPM  
  pantoprazole (PROTONIX) tablet 40 mg  40 mg Oral ACB  sodium chloride (NS) flush 5-40 mL  5-40 mL IntraVENous Q8H  
 sodium chloride (NS) flush 5-40 mL  5-40 mL IntraVENous PRN  
 acetaminophen (TYLENOL) tablet 650 mg  650 mg Oral Q6H PRN Or  
 acetaminophen (TYLENOL) suppository 650 mg  650 mg Rectal Q6H PRN  polyethylene glycol (MIRALAX) packet 17 g  17 g Oral DAILY PRN  
 guaiFENesin-dextromethorphan (ROBITUSSIN DM) 100-10 mg/5 mL syrup 5 mL  5 mL Oral Q4H PRN Objective:  
 
Blood pressure (!) 132/49, pulse 65, temperature 97.5 °F (36.4 °C), resp. rate 20, weight 112.5 kg (248 lb), SpO2 96 %. No intake/output data recorded. 01/26 1901 - 01/28 0700 In: 76 [P.O.:75] Out: 600 [Urine:600] EXAM:  GENERAL: alert, cooperative, no distress, HEART: regular rate and rhythm, LUNGS: chest clear, no wheezing, rales, normal symmetric air entry, ABDOMEN:  Bowel sounds are normal, liver is not enlarged, spleen is not enlarged and EXTREMITY: edema diffusely edematous Data Review Recent Results (from the past 24 hour(s)) GLUCOSE, POC Collection Time: 01/27/21  7:57 AM  
Result Value Ref Range Glucose (POC) 89 65 - 100 mg/dL Performed by Rogerio Multani GLUCOSE, POC Collection Time: 01/27/21 11:11 AM  
Result Value Ref Range Glucose (POC) 117 (H) 65 - 100 mg/dL Performed by Lena CATHERINE, ALLOCATE Collection Time: 01/27/21  2:45 PM  
Result Value Ref Range HISTORY CHECKED? Historical check performed GLUCOSE, POC Collection Time: 01/27/21  4:35 PM  
Result Value Ref Range Glucose (POC) 103 (H) 65 - 100 mg/dL Performed by Michelle Manriquez (PCT) GLUCOSE, POC Collection Time: 01/27/21  8:35 PM  
Result Value Ref Range Glucose (POC) 70 65 - 100 mg/dL Performed by Paige Herman (PCT) CBC WITH AUTOMATED DIFF Collection Time: 01/28/21  4:55 AM  
Result Value Ref Range WBC 17.0 (H) 3.6 - 11.0 K/uL RBC 2.80 (L) 3.80 - 5.20 M/uL HGB 7.9 (L) 11.5 - 16.0 g/dL HCT 27.5 (L) 35.0 - 47.0 % MCV 98.2 80.0 - 99.0 FL  
 MCH 28.2 26.0 - 34.0 PG  
 MCHC 28.7 (L) 30.0 - 36.5 g/dL  
 RDW 18.5 (H) 11.5 - 14.5 % PLATELET 416 127 - 881 K/uL MPV 10.5 8.9 - 12.9 FL  
 NRBC 0.5 (H) 0  WBC ABSOLUTE NRBC 0.08 (H) 0.00 - 0.01 K/uL NEUTROPHILS PENDING % LYMPHOCYTES PENDING % MONOCYTES PENDING % EOSINOPHILS PENDING % BASOPHILS PENDING % IMMATURE GRANULOCYTES PENDING %  
 ABS. NEUTROPHILS PENDING K/UL  
 ABS. LYMPHOCYTES PENDING K/UL  
 ABS. MONOCYTES PENDING K/UL  
 ABS. EOSINOPHILS PENDING K/UL  
 ABS. BASOPHILS PENDING K/UL  
 ABS. IMM. GRANS. PENDING K/UL  
 DF PENDING   
METABOLIC PANEL, COMPREHENSIVE Collection Time: 01/28/21  4:55 AM  
Result Value Ref Range Sodium 139 136 - 145 mmol/L Potassium 3.3 (L) 3.5 - 5.1 mmol/L Chloride 100 97 - 108 mmol/L  
 CO2 33 (H) 21 - 32 mmol/L Anion gap 6 5 - 15 mmol/L Glucose 126 (H) 65 - 100 mg/dL BUN 26 (H) 6 - 20 MG/DL Creatinine 1.18 (H) 0.55 - 1.02 MG/DL  
 BUN/Creatinine ratio 22 (H) 12 - 20 GFR est AA 54 (L) >60 ml/min/1.73m2 GFR est non-AA 45 (L) >60 ml/min/1.73m2 Calcium 7.4 (L) 8.5 - 10.1 MG/DL Bilirubin, total 0.6 0.2 - 1.0 MG/DL  
 ALT (SGPT) 33 12 - 78 U/L  
 AST (SGOT) 25 15 - 37 U/L Alk. phosphatase 210 (H) 45 - 117 U/L Protein, total 5.2 (L) 6.4 - 8.2 g/dL Albumin 2.1 (L) 3.5 - 5.0 g/dL Globulin 3.1 2.0 - 4.0 g/dL A-G Ratio 0.7 (L) 1.1 - 2.2 MAGNESIUM Collection Time: 01/28/21  4:55 AM  
Result Value Ref Range Magnesium 2.5 (H) 1.6 - 2.4 mg/dL Assessment:  
 
Active Problems: 
  Chronic respiratory failure with hypoxia ([facility] [address] (11/2/2020) Overview: On 3L NC at home Leukocytosis (11/3/2020) Pneumonia (1/26/2021) SOB (shortness of breath) (1/26/2021) Anemia (1/26/2021) Acute on chronic systolic CHF (congestive heart failure) ([facility] [address] (1/26/2021) Uncontrolled type 2 diabetes mellitus with hyperglycemia ([facility] (1/26/2021) Had candida esophagitis when here last.  Will repeat treatment again Plan: 1.  nystatin

## 2021-01-28 NOTE — WOUND CARE
New Consult for multiple wounds an redness on buttocks Chart reviewed, patient oriented X4, patient assisted with turning without difficulty. Incontinent of liquid dark brown diarrhea, Assisted [place] staff nurse with providing of care. Soiled Purewick removed. patient laying in [place] [facility] bed. All skin folds and bony prominences assessed. Generalized edema in lower extremities and upper extremities. Right dorsal foot blanchable redness and cool to touch. Left foot warm to touch. Fragile skin. bilateral heels blanchable redness skin intact. Heels offloaded on pillows. Lower extremities evelina redness blanchable. Old healed wounds with scarring. Buttocks blanchable redness skin intact. Zinc paste applied. Groin redness, zinc applied. 1. POA right elbow . 4x. 4x0.1cm pink bed with slight serous sanguinous drainage, no redness around wound. Cleansed with wipes and covered with foam dressing. 2. POA right lower upper extremity . 4x. 4x0.1cm pink wound bed, healing. No drainage noted , no redness in lindsay-wound. cleansed with wipes and foam dressing applied. 3.POA Left lower upper extremity . 8x.8x0.1cm yellow wound bed. Cleansed with wipes. Foam applied 4. POAright lower lateral lower extremity 0.4x0.4x0.1 pink wound bed slight serous drainage. Cleansed with wipes and covered with foam. 
 
5.POA Left planter toe . 7x.7 dry callus dark brown in center and resurfaced at periwound area. 6.POA 5th toe small brown dry scab in place. No redness noted. Recommendations: 
Right UE and LLE wounds:Cleanse with purple wipes and cover with foam dressing. Change every 3 days Turn reposition approximately every 2 hours and offload heels with pillows at all times in bed. Z-guard cream to buttocks and sacrum daily and as needed with incontinence care Minimize layers of linen/-pads under patient to optimize support surface.  
 
Discussed with MADAI Lee 
 Transition of Care: Plan to follow weekly and  as needed while admitted to hospital.

## 2021-01-29 NOTE — PROGRESS NOTES
Cardiology Progress Note     [facility]   
NAME:  Mathew Burns :   1945 MRN:   [id number] Assessment/Plan: 1. PAF: BP low despite holding Cardizem yesterday, will transition to IR (60 mg) QID. Per telemetry she was NSR on admission and has been in and out of NSR/Afib/a-flutter. For now will continue amiodarone- dose reduced to maintenance dose yesterday 2. Chronic resp failure: on 3 liters oxygen baseline. 3. HF p EF: on IV lasix - I doubt I & O s are accurate. Repeat chest xray in the morning. Limited echo to assess LVEF - normal 2020 4. Anemia 5. Probable PNA: on zithromax, rocephin. Pt personally seen and examined. Chart reviewed. Agree with advanced NP's history, exam and  A/P with changes/additons. Visit Vitals BP (!) 124/56 (BP 1 Location: Right upper arm, BP Patient Position: Supine) Pulse 83 Temp 98.1 °F (36.7 °C) Resp 23 Ht 5' 7\" (1.702 m) Wt 246 lb 11.1 oz (111.9 kg) SpO2 95% BMI 38.64 kg/m²  
 
21 ECHO ADULT FOLLOW-UP OR LIMITED 2021 Narrative · Pericardium: There is a moderate left pleural effusion. · LV: Estimated LVEF is 60 - 65%. Normal cavity size, wall thickness and  
systolic function (ejection fraction normal). · TV: Mild tricuspid valve regurgitation is present. Signed by: Mita Bryant MD  
 
 
 
Nml LVEF Cardizem IR /Amio. If in persistent Afib - DC amio; On eliquis. DC ASA - sec to anemia. Keep Hb>8 On IV lasix but may have to back off if BP continues to low Reid Whitmore MD, [facility] [place] Subjective:  
Mathew Burns is a 70 y.o. female with past medical history of COPD on 3 liters nasal canula oxygen OP,  paroxysmal atrial fibrillation,  autoimmune disease, fibromyalgia, CAD, HFpEF, obesity presented to the ED from home with chief complaints of shortness of breath (SOB) and low hemoglobin. Patient has chronic SOB but worsened. She was last hospitalized here from 2021 - 1/15/2021 with left lower lobe pneumonia, acute on chronic hypoxic respiratory failure, sepsis, GI bleed, anemia. proBNP = 1,443. Chest xray portable showed cardiomegaly, interstitial edema, and density in left lung base Cardiology reconsulted for BP 0815: Patients BP low at 103/41 with a MAP of 55 and HR of 73. Dr. Gifford Senate notified and orders to put in consult to cardiology. Cardiac ROS: Patient denies any  chest pain . + stewart Feels nauseated this morning Previous Cardiac Eval 
20 ECHO ADULT COMPLETE 2020 Narrative · LV: Estimated LVEF is 60 - 65%. Visually measured ejection fraction. Normal cavity size and systolic function (ejection fraction normal). Mild  
concentric hypertrophy. Wall motion: normal. 
· AV: With no evidence of reduced excursion. Signed by: Carrie Toussaint MD  
 
XR Results (most recent): 
Results from [facility] [place]counter encounter on 21 XR CHEST PORT Narrative EXAM: XR CHEST PORT INDICATION: evaluate infiltrate COMPARISON: 2021 FINDINGS: A portable AP radiograph of the chest was obtained at 0 3:30 hours. The patient is on a cardiac monitor. There is coarsening of the central 
markings. The left hemidiaphragm is blunted. A linear airspace process is 
present in the left midlung. Impression Central congestion with left pleural effusion. Left mid lung 
subsegmental atelectasis. Persistent left basilar compressive atelectasis versus 
infiltrate Objective:  
 
Visit Vitals BP (!) 110/37 (BP 1 Location: Right upper arm, BP Patient Position: Supine) Pulse 98 Temp 98.1 °F (36.7 °C) Resp 20 Wt 246 lb 9.6 oz (111.9 kg) SpO2 96% BMI 38.62 kg/m² O2 Flow Rate (L/min): 4 l/min O2 Device: Nasal cannula Temp (24hrs), Av.9 °F (36.6 °C), Min:97.6 °F (36.4 °C), Max:98.4 °F (36.9 °C) No intake/output data recorded. 01/27 1901 - 01/29 0700 In: 76 [P.O.:75] Out: 650 [Urine:650] TELE: AF v rate 80s General: A& O x3, obese, elderly, ill appearing HEENT: Atraumatic. Pink and moist.  Anicteric sclerae. Neck : Supple Lungs: Diminished bases - exam limitied d/t body habitus Heart: irregular rhythm 2/6 systolic Abdomen: obese, Soft, non-distended, non-tender. + Bowel sounds. Extremities: generalized edema. Neurologic: Grossly intact. No acute neurological distress. Psych: limited insight. Not anxious or agitated. Care Plan discussed with: 
  Comments Patient Family RN x Care Manager Consultant:     
 
 
Data Review: No lab exists for component: ITNL Recent Labs  
  01/27/21 
0158 01/26/21 
1513 TROIQ <0.05 <0.05 Recent Labs  
  01/29/21 
0434 01/28/21 
0455 01/27/21 
0158 01/26/21 
1513 * 139  --  139  
K 4.6 3.3*  --  3.7  100  --  100 CO2 34* 33*  --  36*  
BUN 25* 26*  --  28* CREA 1.22* 1.18*  --  1.42* * 126*  --  262* MG 2.5* 2.5* 2.3  --   
ALB 2.0* 2.1*  --  2.2* WBC 19.3* 17.0* 18.4* 18.4* HGB 7.3* 7.9* 6.8* 7.2* HCT 25.5* 27.5* 23.8* 25.5*  
 346 369 354 Recent Labs  
  01/27/21 0158 INR 1.1 PTP 10.9 APTT 25.3 Medications reviewed Current Facility-Administered Medications Medication Dose Route Frequency  predniSONE (DELTASONE) tablet 10 mg  10 mg Oral DAILY WITH BREAKFAST  nystatin (MYCOSTATIN) 100,000 unit/mL oral suspension 500,000 Units  500,000 Units Oral QID  potassium chloride SR (KLOR-CON 10) tablet 20 mEq  20 mEq Oral BID  
 amiodarone (CORDARONE) tablet 200 mg  200 mg Oral DAILY  dilTIAZem ER (CARDIZEM CD) capsule 300 mg  300 mg Oral DAILY  furosemide (LASIX) injection 40 mg  40 mg IntraVENous DAILY  glucose chewable tablet 16 g  4 Tab Oral PRN  
 dextrose (D50W) injection syrg 12.5-25 g  25-50 mL IntraVENous PRN  
  glucagon (GLUCAGEN) injection 1 mg  1 mg IntraMUSCular PRN  
 insulin lispro (HUMALOG) injection   SubCUTAneous AC&HS  cefTRIAXone (ROCEPHIN) 1 g in sterile water (preservative free) 10 mL IV syringe  1 g IntraVENous Q24H  
 azithromycin (ZITHROMAX) 500 mg in 0.9% sodium chloride 250 mL (VIAL-MATE)  500 mg IntraVENous Q24H  
 0.9% sodium chloride infusion 250 mL  250 mL IntraVENous PRN  
 albuterol-ipratropium (DUO-NEB) 2.5 MG-0.5 MG/3 ML  3 mL Nebulization Q6H RT  
 arformoteroL (BROVANA) neb solution 15 mcg  15 mcg Nebulization BID RT  
 ondansetron (ZOFRAN) injection 4 mg  4 mg IntraVENous Q4H PRN  
 cyanocobalamin (VITAMIN B12) injection 1,000 mcg  1,000 mcg IntraMUSCular DAILY  lipase-protease-amylase (CREON 24,000) capsule 2 Cap  2 Cap Oral TID WITH MEALS  
 albuterol (PROVENTIL HFA, VENTOLIN HFA, PROAIR HFA) inhaler 2 Puff  2 Puff Inhalation Q4H PRN  
 apixaban (ELIQUIS) tablet 5 mg  5 mg Oral BID  aspirin chewable tablet 81 mg  81 mg Oral DAILY  cholecalciferol (VITAMIN D3) (1000 Units /25 mcg) tablet 2 Tab  2,000 Units Oral DAILY  cyanocobalamin (VITAMIN B12) tablet 1,000 mcg  1,000 mcg Oral DAILY  DULoxetine (CYMBALTA) capsule 60 mg  60 mg Oral DAILY  ferrous sulfate tablet 325 mg  325 mg Oral DAILY WITH BREAKFAST  mirtazapine (REMERON) tablet 15 mg  15 mg Oral QHS  montelukast (SINGULAIR) tablet 10 mg  10 mg Oral QPM  
 pantoprazole (PROTONIX) tablet 40 mg  40 mg Oral ACB  sodium chloride (NS) flush 5-40 mL  5-40 mL IntraVENous Q8H  
 sodium chloride (NS) flush 5-40 mL  5-40 mL IntraVENous PRN  
 acetaminophen (TYLENOL) tablet 650 mg  650 mg Oral Q6H PRN Or  
 acetaminophen (TYLENOL) suppository 650 mg  650 mg Rectal Q6H PRN  polyethylene glycol (MIRALAX) packet 17 g  17 g Oral DAILY PRN  
 guaiFENesin-dextromethorphan (ROBITUSSIN DM) 100-10 mg/5 mL syrup 5 mL  5 mL Oral Q4H PRN Warner Sable, NP

## 2021-01-29 NOTE — PROGRESS NOTES
Gastrointestinal Progress Note 1/29/2021 Admit Date: 1/26/2021 Subjective:  
 
Appears much more alert and comfortable. She has no pain; today there is no diarrhea. No much of an appetite but does not have nausea however. Current Facility-Administered Medications Medication Dose Route Frequency  dilTIAZem IR (CARDIZEM) tablet 60 mg  60 mg Oral AC&HS  [START ON 1/30/2021] predniSONE (DELTASONE) tablet 20 mg  20 mg Oral DAILY WITH BREAKFAST  azithromycin (ZITHROMAX) tablet 500 mg  500 mg Oral QPM  
 nystatin (MYCOSTATIN) 100,000 unit/mL oral suspension 500,000 Units  500,000 Units Oral QID  potassium chloride SR (KLOR-CON 10) tablet 20 mEq  20 mEq Oral BID  
 amiodarone (CORDARONE) tablet 200 mg  200 mg Oral DAILY  furosemide (LASIX) injection 40 mg  40 mg IntraVENous DAILY  glucose chewable tablet 16 g  4 Tab Oral PRN  
 dextrose (D50W) injection syrg 12.5-25 g  25-50 mL IntraVENous PRN  
 glucagon (GLUCAGEN) injection 1 mg  1 mg IntraMUSCular PRN  
 insulin lispro (HUMALOG) injection   SubCUTAneous AC&HS  cefTRIAXone (ROCEPHIN) 1 g in sterile water (preservative free) 10 mL IV syringe  1 g IntraVENous Q24H  
 0.9% sodium chloride infusion 250 mL  250 mL IntraVENous PRN  
 albuterol-ipratropium (DUO-NEB) 2.5 MG-0.5 MG/3 ML  3 mL Nebulization Q6H RT  
 arformoteroL (BROVANA) neb solution 15 mcg  15 mcg Nebulization BID RT  
 ondansetron (ZOFRAN) injection 4 mg  4 mg IntraVENous Q4H PRN  
 cyanocobalamin (VITAMIN B12) injection 1,000 mcg  1,000 mcg IntraMUSCular DAILY  lipase-protease-amylase (CREON 24,000) capsule 2 Cap  2 Cap Oral TID WITH MEALS  
 albuterol (PROVENTIL HFA, VENTOLIN HFA, PROAIR HFA) inhaler 2 Puff  2 Puff Inhalation Q4H PRN  
 apixaban (ELIQUIS) tablet 5 mg  5 mg Oral BID  cholecalciferol (VITAMIN D3) (1000 Units /25 mcg) tablet 2 Tab  2,000 Units Oral DAILY  cyanocobalamin (VITAMIN B12) tablet 1,000 mcg  1,000 mcg Oral DAILY  DULoxetine (CYMBALTA) capsule 60 mg  60 mg Oral DAILY  ferrous sulfate tablet 325 mg  325 mg Oral DAILY WITH BREAKFAST  mirtazapine (REMERON) tablet 15 mg  15 mg Oral QHS  montelukast (SINGULAIR) tablet 10 mg  10 mg Oral QPM  
 pantoprazole (PROTONIX) tablet 40 mg  40 mg Oral ACB  sodium chloride (NS) flush 5-40 mL  5-40 mL IntraVENous Q8H  
 sodium chloride (NS) flush 5-40 mL  5-40 mL IntraVENous PRN  
 acetaminophen (TYLENOL) tablet 650 mg  650 mg Oral Q6H PRN Or  
 acetaminophen (TYLENOL) suppository 650 mg  650 mg Rectal Q6H PRN  polyethylene glycol (MIRALAX) packet 17 g  17 g Oral DAILY PRN  
 guaiFENesin-dextromethorphan (ROBITUSSIN DM) 100-10 mg/5 mL syrup 5 mL  5 mL Oral Q4H PRN Objective:  
 
Blood pressure (!) 103/52, pulse 89, temperature 98 °F (36.7 °C), resp. rate 26, height 5' 7\" (1.702 m), weight 111.9 kg (246 lb 11.1 oz), SpO2 97 %. No intake/output data recorded. 01/27 1901 - 01/29 0700 In: 76 [P.O.:75] Out: 650 [Urine:650] EXAM:  GENERAL: alert, cooperative, no distress, HEART: regular rate and rhythm, LUNGS: chest clear, no wheezing, rales, normal symmetric air entry, ABDOMEN:  Bowel sounds are normal, liver is not enlarged, spleen is not enlarged and EXTREMITY: edema diffuse Data Review Recent Results (from the past 24 hour(s)) GLUCOSE, POC Collection Time: 01/28/21  5:03 PM  
Result Value Ref Range Glucose (POC) 153 (H) 65 - 100 mg/dL Performed by Keesha Calzada GLUCOSE, POC Collection Time: 01/28/21  9:23 PM  
Result Value Ref Range Glucose (POC) 186 (H) 65 - 100 mg/dL Performed by Shazia Mcpherson CBC WITH AUTOMATED DIFF Collection Time: 01/29/21  4:34 AM  
Result Value Ref Range WBC 19.3 (H) 3.6 - 11.0 K/uL  
 RBC 2.56 (L) 3.80 - 5.20 M/uL HGB 7.3 (L) 11.5 - 16.0 g/dL HCT 25.5 (L) 35.0 - 47.0 % MCV 99.6 (H) 80.0 - 99.0 FL  
 MCH 28.5 26.0 - 34.0 PG  
 MCHC 28.6 (L) 30.0 - 36.5 g/dL RDW 18.3 (H) 11.5 - 14.5 % PLATELET 799 537 - 445 K/uL MPV 10.4 8.9 - 12.9 FL  
 NRBC 0.2 (H) 0  WBC ABSOLUTE NRBC 0.04 (H) 0.00 - 0.01 K/uL NEUTROPHILS 84 (H) 32 - 75 % LYMPHOCYTES 7 (L) 12 - 49 % MONOCYTES 6 5 - 13 % EOSINOPHILS 1 0 - 7 % BASOPHILS 0 0 - 1 % IMMATURE GRANULOCYTES 2 (H) 0.0 - 0.5 % ABS. NEUTROPHILS 16.1 (H) 1.8 - 8.0 K/UL  
 ABS. LYMPHOCYTES 1.4 0.8 - 3.5 K/UL  
 ABS. MONOCYTES 1.2 (H) 0.0 - 1.0 K/UL  
 ABS. EOSINOPHILS 0.2 0.0 - 0.4 K/UL  
 ABS. BASOPHILS 0.0 0.0 - 0.1 K/UL  
 ABS. IMM. GRANS. 0.4 (H) 0.00 - 0.04 K/UL  
 DF SMEAR SCANNED    
 RBC COMMENTS ANISOCYTOSIS 1+ 
    
 RBC COMMENTS HYPOCHROMIA PRESENT 
    
 RBC COMMENTS MACROCYTOSIS 
PRESENT 
    
METABOLIC PANEL, COMPREHENSIVE Collection Time: 01/29/21  4:34 AM  
Result Value Ref Range Sodium 135 (L) 136 - 145 mmol/L Potassium 4.6 3.5 - 5.1 mmol/L Chloride 101 97 - 108 mmol/L  
 CO2 34 (H) 21 - 32 mmol/L Anion gap 0 (L) 5 - 15 mmol/L Glucose 155 (H) 65 - 100 mg/dL BUN 25 (H) 6 - 20 MG/DL Creatinine 1.22 (H) 0.55 - 1.02 MG/DL  
 BUN/Creatinine ratio 20 12 - 20 GFR est AA 52 (L) >60 ml/min/1.73m2 GFR est non-AA 43 (L) >60 ml/min/1.73m2 Calcium 7.6 (L) 8.5 - 10.1 MG/DL Bilirubin, total 0.5 0.2 - 1.0 MG/DL  
 ALT (SGPT) 34 12 - 78 U/L  
 AST (SGOT) 48 (H) 15 - 37 U/L Alk. phosphatase 229 (H) 45 - 117 U/L Protein, total 5.5 (L) 6.4 - 8.2 g/dL Albumin 2.0 (L) 3.5 - 5.0 g/dL Globulin 3.5 2.0 - 4.0 g/dL A-G Ratio 0.6 (L) 1.1 - 2.2 MAGNESIUM Collection Time: 01/29/21  4:34 AM  
Result Value Ref Range Magnesium 2.5 (H) 1.6 - 2.4 mg/dL GLUCOSE, POC Collection Time: 01/29/21  8:15 AM  
Result Value Ref Range Glucose (POC) 121 (H) 65 - 100 mg/dL Performed by Juan Mascorro (PCT) GLUCOSE, POC Collection Time: 01/29/21 11:00 AM  
Result Value Ref Range Glucose (POC) 88 65 - 100 mg/dL  Performed by Sheryl Brown   
 ECHO ADULT FOLLOW-UP OR LIMITED Collection Time: 01/29/21  2:50 PM  
Result Value Ref Range IVSd 0.88 0.6 - 0.9 cm LVIDd 5.01 3.9 - 5.3 cm LVIDs 3.08 cm  
 LVPWd 0.93 (A) 0.6 - 0.9 cm Left Atrium Major Axis 4.18 cm  
 LA Volume 46.11 22 - 52 mL  
 LA Area 4C 16.64 cm2 LA Vol 2C 43.32 22 - 52 mL  
 LA Vol 4C 39.32 22 - 52 mL  
 LA Volume DISK BP 42.68 22 - 52 mL  
 MV A Andrew 105.07 centimeter/second Mitral Valve E Wave Deceleration Time 166.59 ms  
 MV E Andrew 151.52 centimeter/second LV E' Lateral Velocity 12.00 centimeter/second LV E' Septal Velocity 10.75 centimeter/second Mitral Valve Pressure Half-time 48.31 ms  
 MVA (PHT) 4.55 cm2 Pulmonic Valve Systolic Peak Instantaneous Gradient 8.62 mmHg Pulmonic Valve Max Velocity 146.76 cm/s Tapse 1.80 1.5 - 2.0 cm Triscuspid Valve Regurgitation Peak Gradient 41.50 mmHg  
 TR Max Velocity 322.08 cm/s  
 LV Mass .9 67 - 162 g  
 LV Mass AL Index 72.3 43 - 95 g/m2 Left Atrium Minor Axis 1.89 cm LA Vol Index 20.86 16 - 28 ml/m2 LA Vol Index 19.60 16 - 28 ml/m2 LA Vol Index 17.79 16 - 28 ml/m2 Assessment:  
 
Active Problems: 
  [facility] (11/2/2020) Overview: On 3L NC at home Leukocytosis (11/3/2020) Pneumonia (1/26/2021) SOB (shortness of breath) (1/26/2021) Anemia (1/26/2021) Acute on chronic systolic CHF (congestive heart failure) ([facility] (1/26/2021) Uncontrolled type 2 diabetes mellitus with hyperglycemia ([facility] (1/26/2021) Plan: 1.  24 hr urine protein

## 2021-01-29 NOTE — PROGRESS NOTES
[facility] Dosing Services: IV to PO Conversion The pharmacist has determined that this patient meets P & T approved criteria for conversion from IV to oral therapy for the following medication:Azithromycin 500 mg IV daily x2 doses left The pharmacist has written the following order for the patient: Azithromycin 500 mg po daily x2 doses left The pharmacist will continue to monitor the patient's status and advise the physician if conversion back to IV therapy is recommended. Signed Eildia Espinal Contact information:  [phone]

## 2021-01-29 NOTE — PROGRESS NOTES
Daily Progress Note: 1/29/2021 Rosanna Kayser Ollen Mocha, MD 
 
Assessment/Plan: 1. Pneumonia  
     - admit to remote telemetry unit with h/o paroxysmal afib 
     - continue IV antibiotics Azithromycin 500 mg IV q 24 hours and Ceftriaxone 1 gram IV q 24 hours 2. Shortness of breath (SOB) - with history of chronic hypoxic respiratory failure on 3 liters chronically - provide supplemental oxygen and continuous pulse oximetry monitoring 
  
3. Acute on chronic diastolic CHF (HFpEF) - diuresis per Cardiology - place on strict Is & Os/ daily weights to monitor fluid status closely 
     - last 2d echocardiogram 9/30/2020: LVEF = 60% - 65% 
     - cardiology consulted 
  
4. Negative for COVID 19 virus infection 
     - rapid COVID 19 test is negative  
     - order Vitamin C, D, and Zinc 
     - Dexamethasone 6 mg q 24 hours - weaned off 
  
5.  Leukocytosis 
     - monitor, antibiotics as above 
  
6. Anemia 
     - transfuse for hemoglobin < 7.0. 
     - repeat H/H 
     - recheck iron profile, B12, folate levels 
  
7. Uncontrolled type 2 DM with hyperglycemia - Humalog insulin correctional coverage, scheduled blood glucose checks and hemoglobin A1c level. 
  
8. Acute superimposed on chronic kidney disease stage 3 
     - monitor and tx as needed 9. Elevated alkaline phosphatase 
     - monitor and treat as needed 
  
10. Hypertension 
       - hold on home Lisinopril - HCTZ with elevated creatinine and soft BPs - restart as able. 
  
11. History of GI bleed - s/p upper GI endoscopy and colonoscopy on 1/4/2021 with Adenomatous polyp of transverse colon [D12.3] Candida esophagitis ([facility] [B37.81] Gastritis and duodenitis [  
  - GI consulted 12. Obesity 
       - recommended weight loss, heart healthy diet, and lifestyle modification 13. GERD 
       - Protonix 
  
14. Paroxysmal atrial fibrillation (afib) - on long term anticoagulation on Eliquis - tx per Cardiology 
  
VTE prophylaxis:  Plan as above 
  
CODE STATUS:  FULL CODE as discussed with patient who requests the same. Problem List: 
Problem List as of 1/29/2021 Date Reviewed: 11/2/2020 Codes Class Noted - Resolved Pneumonia ICD-10-CM: J18.9 ICD-9-CM: 637  1/26/2021 - Present SOB (shortness of breath) ICD-10-CM: R06.02 
ICD-9-CM: 786.05  1/26/2021 - Present Anemia ICD-10-CM: D64.9 ICD-9-CM: 285.9  1/26/2021 - Present Acute on chronic systolic CHF (congestive heart failure) (HCC) ICD-10-CM: O55.02 ICD-9-CM: 428.23, 428.0  1/26/2021 - Present Uncontrolled type 2 diabetes mellitus with hyperglycemia (HCC) ICD-10-CM: E11.65 ICD-9-CM: 250.02  1/26/2021 - Present Left lower lobe pneumonia ICD-10-CM: J18.9 ICD-9-CM: 775  1/1/2021 - Present Leukocytosis ICD-10-CM: I89.473 ICD-9-CM: 288.60  11/3/2020 - Present Chronic respiratory failure with hypoxia [facility]) ICD-10-CM: J96.11 
ICD-9-CM: 518.83, 799.02  11/2/2020 - Present Overview Signed 11/2/2020 10:12 PM by Oswald Pederson MD  
  On 3L NC at home Cellulitis of lower extremity ICD-10-CM: L03.119 ICD-9-CM: 682.6  3/23/2020 - Present ASO (arteriosclerosis obliterans) ICD-10-CM: I70.90 ICD-9-CM: 440.9  9/5/2019 - Present PVD (peripheral vascular disease) ([facility] ICD-10-CM: I73.9 ICD-9-CM: 443.9  8/1/2019 - Present Severe obesity ([facility] ICD-10-CM: E66.01 
ICD-9-CM: 278.01  7/30/2019 - Present COPD (chronic obstructive pulmonary disease) (HCC) ICD-10-CM: J44.9 ICD-9-CM: 529  7/13/2019 - Present Normocytic anemia ICD-10-CM: D64.9 ICD-9-CM: 285.9  7/13/2019 - Present PAD (peripheral artery disease) ([facility]) ICD-10-CM: I73.9 ICD-9-CM: 443.9  7/13/2019 - Present Mild intermittent asthma ICD-10-CM: J45.20 ICD-9-CM: 493.90  5/17/2019 - Present Brachial artery thrombus (HCC) ICD-10-CM: W11.8 ICD-9-CM: 444.21  4/26/2019 - Present Sleep apnea ICD-10-CM: G47.30 ICD-9-CM: 780.57  1/5/2019 - Present Overview Signed 1/5/2019  8:41 PM by Bakari Lopez DO  
  wears CPAP Atrial fibrillation [facility]) ICD-10-CM: I48.91 
ICD-9-CM: 427.31  11/16/2018 - Present Obesity (BMI 30-39.9) (Chronic) ICD-10-CM: U01.3 ICD-9-CM: 278.00  11/13/2018 - Present Recurrent deep vein thrombosis (DVT) (HCC) ICD-10-CM: I82.409 ICD-9-CM: 453.40  3/30/2018 - Present Chronic anticoagulation (Chronic) ICD-10-CM: Z79.01 
ICD-9-CM: V58.61  3/30/2018 - Present Essential hypertension (Chronic) ICD-10-CM: I10 
ICD-9-CM: 401.9  1/22/2016 - Present CAD (coronary artery disease) ICD-10-CM: I25.10 ICD-9-CM: 414.00  10/9/2015 - Present Type II diabetes mellitus (HCC) (Chronic) ICD-10-CM: E11.9 ICD-9-CM: 250.00  10/9/2015 - Present RESOLVED: Syncope and collapse ICD-10-CM: R55 
ICD-9-CM: 780.2  9/29/2020 - 11/2/2020 RESOLVED: Cellulitis ICD-10-CM: L03.90 ICD-9-CM: 682.9  3/23/2020 - 5/29/2020 RESOLVED: Hypokalemia ICD-10-CM: E87.6 ICD-9-CM: 276.8  3/23/2020 - 5/29/2020 RESOLVED: Hyponatremia ICD-10-CM: E87.1 ICD-9-CM: 276.1  3/23/2020 - 5/29/2020 RESOLVED: Diarrhea ICD-10-CM: R19.7 ICD-9-CM: 787.91  3/23/2020 - 5/29/2020 RESOLVED: Syncope and collapse ICD-10-CM: R55 
ICD-9-CM: 780.2  7/13/2019 - 5/29/2020 RESOLVED: UTI (urinary tract infection) ICD-10-CM: N39.0 ICD-9-CM: 599.0  7/13/2019 - 11/2/2020 RESOLVED: Sepsis ([address] ICD-10-CM: A41.9 ICD-9-CM: 038.9, 995.91  7/13/2019 - 11/3/2020 RESOLVED: Leg wound, left ICD-10-CM: O54.770P ICD-9-CM: 891.0  7/13/2019 - 5/29/2020 RESOLVED: Leg laceration, right, initial encounter ICD-10-CM: L61.499D ICD-9-CM: 894.0  7/13/2019 - 5/29/2020 RESOLVED: Cellulitis of right upper arm ICD-10-CM: L03. [facility] 
 ICD-9-CM: 682.3  5/17/2019 - 5/29/2020 RESOLVED: Gangrene of finger of right hand ([facility] ICD-10-CM: X68 
ICD-9-CM: 785.4  5/17/2019 - 11/2/2020 RESOLVED: Bowel obstruction ([facility] ICD-10-CM: H16.313 ICD-9-CM: 560.9  1/8/2019 - 5/29/2020 RESOLVED: Partial small bowel obstruction ([facility] ICD-10-CM: K56.600 ICD-9-CM: 560.9  1/5/2019 - 5/29/2020 RESOLVED: Dyspnea ICD-10-CM: R06.00 
ICD-9-CM: 786.09  11/13/2018 - 5/29/2020 RESOLVED: ARF (acute renal failure) ([facility]) ICD-10-CM: N17.9 ICD-9-CM: 584.9  11/13/2018 - 5/29/2020 RESOLVED: Closed wedge compression fracture of T7 vertebra ([facility] ICD-10-CM: X58.063T ICD-9-CM: 805.2  11/13/2018 - 5/29/2020 RESOLVED: Type 2 diabetes with nephropathy ([facility] ICD-10-CM: E11.21 
ICD-9-CM: 250.40, 583.81  10/1/2018 - 11/2/2020 RESOLVED: Chest pain ICD-10-CM: R07.9 ICD-9-CM: 786.50  6/27/2018 - 5/29/2020 RESOLVED: Abdominal pain ICD-10-CM: R10.9 ICD-9-CM: 789.00  6/27/2018 - 5/29/2020 RESOLVED: Coronary artery disease involving native coronary artery without angina pectoris (Chronic) ICD-10-CM: I25.10 ICD-9-CM: 414.01  1/22/2016 - 11/2/2020 RESOLVED: HTN (hypertension) ICD-10-CM: I10 
ICD-9-CM: 401.9  10/9/2015 - 1/22/2016 RESOLVED: NSTEMI (non-ST elevated myocardial infarction) ([facility] ICD-10-CM: I21.4 ICD-9-CM: 410.70  10/9/2015 - 5/29/2020 Subjective: 70 y.o. female with past medical history of chronic hypoxic respiratory failure (on 3 liters home oxygen), asthma, atrial fibrillation (Afib), autoimmune disease, fibromyalgia, CAD, heart failure (HFpEF), T7 vertebral compression fracture, COPD, type 2 DM, DVT, GERD, hypertension, NSTEMI, PAD, sleep apnea, obesity presented to the ED from home with chief complaints of shortness of breath (SOB) and low hemoglobin. Patient has chronic SOB but worsened. She was last hospitalized here from 1/1/2021 - 1/15/2021 with left lower lobe pneumonia, acute on chronic hypoxic respiratory failure, sepsis, GI bleed, anemia. Patient was transfused with 2 units PRBCs with hemoglobin = 4.9 on 1/1/2021. Last Hgb= 6.5 on 1/21/2021. On arrival in the ED today, initial recorded vital signs were BP = 139/47, HR= 82, RR= 22, O2sat= 94% on 4 liters O2 nasal cannula (NC). WBC = 18,400. proBNP = 1,443. Chest xray portable showed cardiomegaly, interstitial edema, and density in left lung base. Patient is now seen for admission to the hospitalist service for continued evaluation and treatments. There were no reports of new onset syncope, loss of consciousness, headache, neck pain, visual disturbance, numbness, paresthesias, focal weakness, chest pain, palpitations, abdominal pain, nausea, vomiting, diarrhea, melena, dysuria, hematuria, calf pain, increased leg swelling/ edema, fever, chills, rash, or known COVID 19 exposure. (Dr Nadia Tracey) 1/27:  Feeling better with less shortness of breath. Hb low so needs another transfusion. No clear source of bleeding. Covid negative. GI to see. 1/28:  Feels some better except sore throat - looks like thrush. Less shortness of breath. Hb up to 7.9 this AM after 1 unit PRC. K+ sl low - replacing. :  Little change from yesterday. BP continues to be on the low side and pt reports feeling lightheaded with sitting up - Cards adjusting meds. Hb down to 7.3 - watching - will likely need another transfusion. WBC 19K - likely due to steroids - weaning. Review of Systems: A comprehensive review of systems was negative except for that written in the HPI. Objective:  
Physical Exam:  
 
Visit Vitals BP (!) 110/37 (BP 1 Location: Right upper arm, BP Patient Position: Supine) Pulse 98 Temp 98.1 °F (36.7 °C) Resp 20 Wt 111.9 kg (246 lb 9.6 oz) SpO2 93% BMI 38.62 kg/m² O2 Flow Rate (L/min): 2 l/min O2 Device: Nasal cannula Temp (24hrs), Av.9 °F (36.6 °C), Min:97.6 °F (36.4 °C), Max:98.4 °F (36.9 °C) No intake/output data recorded.  1901 -  0700 In: 76 [P.O.:75] Out: 650 [Urine:650] General:  Alert, obese, cooperative, no distress, appears stated age. Head:  Normocephalic, without obvious abnormality, atraumatic. Eyes:  Conjunctivae/corneas clear. EOMs intact. Throat: Lips, mucosa, and tongue moist; throat red with a few white patches on tongue. Neck: Supple, symmetrical, trachea midline, no adenopathy, thyroid: no enlargement/tenderness/nodules, no carotid bruit and no JVD. Lungs:   Clear to auscultation bilaterally. Chest wall:  No tenderness or deformity. Heart:  irreg with rate in 68S, no murmur, click, rub or gallop. Abdomen:   Soft, non-tender. Bowel sounds normal. No masses,  No organomegaly. Extremities: no cyanosis. 1+ LE edema on top of chronic stasis changes. No calf tenderness or cords. Pulses: 2+ and symmetric all extremities. Skin:  turgor normal. No rashes Neurologic:   Alert and oriented X 3. Fine motor of hands and fingers normal.   equal.  No cogwheeling or rigidity. Gait not tested at this time. Sensation grossly normal to touch. Gross motor of extremities normal except generalized weakness. Data Review: Recent Days: 
Recent Labs  
  01/29/21 
0434 01/28/21 
0455 01/27/21 
0158 WBC 19.3* 17.0* 18.4* HGB 7.3* 7.9* 6.8* HCT 25.5* 27.5* 23.8*  
 346 369 Recent Labs  
  01/29/21 
0434 01/28/21 
0455 01/27/21 
0158 01/26/21 
1513 * 139  --  139  
K 4.6 3.3*  --  3.7  100  --  100 CO2 34* 33*  --  36* * 126*  --  262* BUN 25* 26*  --  28* CREA 1.22* 1.18*  --  1.42* CA 7.6* 7.4*  --  7.9*  
MG 2.5* 2.5* 2.3  --   
ALB 2.0* 2.1*  --  2.2* TBILI 0.5 0.6  --  0.6 ALT 34 33  --  38 INR  --   --  1.1  -- No results for input(s): PH, PCO2, PO2, HCO3, FIO2 in the last 72 hours. 24 Hour Results: 
Recent Results (from the past 24 hour(s)) GLUCOSE, POC Collection Time: 01/28/21 11:31 AM  
Result Value Ref Range Glucose (POC) 83 65 - 100 mg/dL Performed by Sina Mondragon (PCT) GLUCOSE, POC Collection Time: 01/28/21  5:03 PM  
Result Value Ref Range Glucose (POC) 153 (H) 65 - 100 mg/dL Performed by Annie Holguinman GLUCOSE, POC Collection Time: 01/28/21  9:23 PM  
Result Value Ref Range Glucose (POC) 186 (H) 65 - 100 mg/dL Performed by Nereyda Barroso CBC WITH AUTOMATED DIFF Collection Time: 01/29/21  4:34 AM  
Result Value Ref Range WBC 19.3 (H) 3.6 - 11.0 K/uL  
 RBC 2.56 (L) 3.80 - 5.20 M/uL HGB 7.3 (L) 11.5 - 16.0 g/dL HCT 25.5 (L) 35.0 - 47.0 % MCV 99.6 (H) 80.0 - 99.0 FL  
 MCH 28.5 26.0 - 34.0 PG  
 MCHC 28.6 (L) 30.0 - 36.5 g/dL  
 RDW 18.3 (H) 11.5 - 14.5 % PLATELET 274 501 - 445 K/uL MPV 10.4 8.9 - 12.9 FL  
 NRBC 0.2 (H) 0  WBC ABSOLUTE NRBC 0.04 (H) 0.00 - 0.01 K/uL NEUTROPHILS 84 (H) 32 - 75 % LYMPHOCYTES 7 (L) 12 - 49 % MONOCYTES 6 5 - 13 % EOSINOPHILS 1 0 - 7 % BASOPHILS 0 0 - 1 % IMMATURE GRANULOCYTES 2 (H) 0.0 - 0.5 % ABS. NEUTROPHILS 16.1 (H) 1.8 - 8.0 K/UL  
 ABS. LYMPHOCYTES 1.4 0.8 - 3.5 K/UL  
 ABS. MONOCYTES 1.2 (H) 0.0 - 1.0 K/UL ABS. EOSINOPHILS 0.2 0.0 - 0.4 K/UL  
 ABS. BASOPHILS 0.0 0.0 - 0.1 K/UL  
 ABS. IMM. GRANS. 0.4 (H) 0.00 - 0.04 K/UL  
 DF SMEAR SCANNED    
 RBC COMMENTS ANISOCYTOSIS 1+ 
    
 RBC COMMENTS HYPOCHROMIA PRESENT 
    
 RBC COMMENTS MACROCYTOSIS 
PRESENT 
    
METABOLIC PANEL, COMPREHENSIVE Collection Time: 01/29/21  4:34 AM  
Result Value Ref Range Sodium 135 (L) 136 - 145 mmol/L Potassium 4.6 3.5 - 5.1 mmol/L Chloride 101 97 - 108 mmol/L  
 CO2 34 (H) 21 - 32 mmol/L Anion gap 0 (L) 5 - 15 mmol/L Glucose 155 (H) 65 - 100 mg/dL BUN 25 (H) 6 - 20 MG/DL Creatinine 1.22 (H) 0.55 - 1.02 MG/DL  
 BUN/Creatinine ratio 20 12 - 20 GFR est AA 52 (L) >60 ml/min/1.73m2 GFR est non-AA 43 (L) >60 ml/min/1.73m2 Calcium 7.6 (L) 8.5 - 10.1 MG/DL Bilirubin, total 0.5 0.2 - 1.0 MG/DL  
 ALT (SGPT) 34 12 - 78 U/L  
 AST (SGOT) 48 (H) 15 - 37 U/L Alk. phosphatase 229 (H) 45 - 117 U/L Protein, total 5.5 (L) 6.4 - 8.2 g/dL Albumin 2.0 (L) 3.5 - 5.0 g/dL Globulin 3.5 2.0 - 4.0 g/dL A-G Ratio 0.6 (L) 1.1 - 2.2 MAGNESIUM Collection Time: 01/29/21  4:34 AM  
Result Value Ref Range Magnesium 2.5 (H) 1.6 - 2.4 mg/dL GLUCOSE, POC Collection Time: 01/29/21  8:15 AM  
Result Value Ref Range Glucose (POC) 121 (H) 65 - 100 mg/dL Performed by Lamin Clark (PCT) Medications reviewed Current Facility-Administered Medications Medication Dose Route Frequency  nystatin (MYCOSTATIN) 100,000 unit/mL oral suspension 500,000 Units  500,000 Units Oral QID  potassium chloride SR (KLOR-CON 10) tablet 20 mEq  20 mEq Oral BID  
 amiodarone (CORDARONE) tablet 200 mg  200 mg Oral DAILY  dilTIAZem ER (CARDIZEM CD) capsule 300 mg  300 mg Oral DAILY  furosemide (LASIX) injection 40 mg  40 mg IntraVENous DAILY  glucose chewable tablet 16 g  4 Tab Oral PRN  
 dextrose (D50W) injection syrg 12.5-25 g  25-50 mL IntraVENous PRN  
  glucagon (GLUCAGEN) injection 1 mg  1 mg IntraMUSCular PRN  
 insulin lispro (HUMALOG) injection   SubCUTAneous AC&HS  cefTRIAXone (ROCEPHIN) 1 g in sterile water (preservative free) 10 mL IV syringe  1 g IntraVENous Q24H  
 azithromycin (ZITHROMAX) 500 mg in 0.9% sodium chloride 250 mL (VIAL-MATE)  500 mg IntraVENous Q24H  
 0.9% sodium chloride infusion 250 mL  250 mL IntraVENous PRN  
 albuterol-ipratropium (DUO-NEB) 2.5 MG-0.5 MG/3 ML  3 mL Nebulization Q6H RT  
 arformoteroL (BROVANA) neb solution 15 mcg  15 mcg Nebulization BID RT  
 predniSONE (DELTASONE) tablet 20 mg  20 mg Oral DAILY WITH BREAKFAST  ondansetron (ZOFRAN) injection 4 mg  4 mg IntraVENous Q4H PRN  
 cyanocobalamin (VITAMIN B12) injection 1,000 mcg  1,000 mcg IntraMUSCular DAILY  lipase-protease-amylase (CREON 24,000) capsule 2 Cap  2 Cap Oral TID WITH MEALS  
 albuterol (PROVENTIL HFA, VENTOLIN HFA, PROAIR HFA) inhaler 2 Puff  2 Puff Inhalation Q4H PRN  
 apixaban (ELIQUIS) tablet 5 mg  5 mg Oral BID  aspirin chewable tablet 81 mg  81 mg Oral DAILY  cholecalciferol (VITAMIN D3) (1000 Units /25 mcg) tablet 2 Tab  2,000 Units Oral DAILY  cyanocobalamin (VITAMIN B12) tablet 1,000 mcg  1,000 mcg Oral DAILY  DULoxetine (CYMBALTA) capsule 60 mg  60 mg Oral DAILY  ferrous sulfate tablet 325 mg  325 mg Oral DAILY WITH BREAKFAST  mirtazapine (REMERON) tablet 15 mg  15 mg Oral QHS  montelukast (SINGULAIR) tablet 10 mg  10 mg Oral QPM  
 pantoprazole (PROTONIX) tablet 40 mg  40 mg Oral ACB  sodium chloride (NS) flush 5-40 mL  5-40 mL IntraVENous Q8H  
 sodium chloride (NS) flush 5-40 mL  5-40 mL IntraVENous PRN  
 acetaminophen (TYLENOL) tablet 650 mg  650 mg Oral Q6H PRN Or  
 acetaminophen (TYLENOL) suppository 650 mg  650 mg Rectal Q6H PRN  polyethylene glycol (MIRALAX) packet 17 g  17 g Oral DAILY PRN  
 guaiFENesin-dextromethorphan (ROBITUSSIN DM) 100-10 mg/5 mL syrup 5 mL  5 mL Oral Q4H PRN  
 
 Care Plan discussed with: Patient/GI/Cardiology and Nurse Total time spent with patient: 30 minutes.  
Meryle Spaniel, MD

## 2021-01-29 NOTE — PROGRESS NOTES
1930 
Bedside and Verbal shift change report given to MADAI Quijano (oncoming nurse) by Philipp Mitchell RN (offgoing nurse). Report included the following information SBAR, Kardex, Intake/Output, MAR, Recent Results and Med Rec Status.

## 2021-01-29 NOTE — PROGRESS NOTES
0700: Bedside shift change report given to [address] [place] [address]  (oncoming nurse) by Tr Schroeder (offgoing nurse). Report included the following information SBAR, Kardex, Procedure Summary, Intake/Output, MAR, Accordion and Cardiac Rhythm a fib.  
 
0815: Patients BP is 110/37 with a map of 61. Dr. Bonny Baldrerama notified states he is ok with BP. Yola Singleton NP notified, orders to give amiodarone and reduced dose of diltiazem (60 mg). 1250: TRANSFER - OUT REPORT: 
 
[facility]bal [facility] to 5th floor (unit) for routine progression of care Report consisted of patients Situation, Background, Assessment and  
Recommendations(SBAR). Information from the following report(s) SBAR, Kardex, Intake/Output, MAR, Accordion and Cardiac Rhythm A fib was reviewed with the receiving nurse. Lines:  
Peripheral IV 01/27/21 Left;Upper Arm (Active) Site Assessment Clean, dry, & intact 01/29/21 3575 Phlebitis Assessment 0 01/29/21 6647 Infiltration Assessment 0 01/29/21 0811 Dressing Status Clean, dry, & intact 01/29/21 8548 Dressing Type Transparent 01/29/21 0601 Hub Color/Line Status End cap changed 01/29/21 9597 Action Taken Open ports on tubing capped 01/29/21 6545 Alcohol Cap Used Yes 01/29/21 1089 Opportunity for questions and clarification was provided. Patient transported with: 
 Registered Nurse

## 2021-01-29 NOTE — PROGRESS NOTES
2:14 PM 
 
TRANSFER - OUT REPORT: 
 
Verbal report given to Mandi Pruett on Daniella William (patient name)  being transferred to Room 335(unit) for routine progression of care Report consisted of patients Situation, Background, Assessment and  
Recommendations(SBAR).

## 2021-01-29 NOTE — NURSE NAVIGATOR
Met with patient at bedside. Introduced self and role of HF NN. Assessed patient's current understanding of illness and hospitalization. Patient had a decent understanding of her anemia, pneumonia, and heart failure with fluid congestion. Reviewed HF diagnosis, symptoms of HF, daily weight with parameters, and early recognition of symptoms and notification of provider. Reviewed HF self care measures including importance of accuracy with medications and relationship of high sodium diet to fluid congestion and symptoms. She does know the name and purpose of her lasix. Patient states she was weighing daily at [facility] but has actually been losing weight. She endorses poor appetite since hospitalized and has her breakfast tray still at bedside. Noted skin tears with dressings on arms. She notes she enjoys ONS and would like to start receiving some. Nutrition consult placed. Patient states her current goal is to continue rehab and get into assisted living. She was last in her independent living apartment on January 1st but knows she cannot return there. She has been on oxygen for a few years and was in and out of rehab earlier in 2020 as well. She states her daughter is supportive and has a copy of her AMD that designated her as decision maker. Patient states she would want to be resuscitated in an arrest situation. She states she is looking forward to assisted living and she was enjoying the staff at [facility]. She  Has had difficulty working with therapy due to weakness and dizziness. She is concerned about her anemia and finding the cause. Patient was given the Living with HF Education Book, HF calendar, and HF magnet.

## 2021-01-29 NOTE — PROGRESS NOTES
Name: Jeanette Fernandez  
: 1945 Admit Date: 2021 Phone: [phone]  Room: [facility]/01 PCP: Ankur Jacob MD  MRN: [id number] Date: 2021  Code: Full Code Chart and notes reviewed. Data reviewed. I review the patient's current medications in the medical record at each encounter. I have evaluated and examined the patient. HPI: 
 
1:42 PM    
 
History was obtained from patient, records review I was asked by Goyo Puri MD to see Rosanna Kayser in consultation for a chief complaint of SOB/PNA. History of Present Illness: 71 y/o F with PMH asthma/COPD (FEV1 51%)  on chronic prednisone 20mg daily, chronic hypoxemic respiratory failure (3L O2), HAYES on CPAP, afib, CAD, HFpEF, DM, DVT, PAD, CKD, GERD, obesity, fibromyalgia who was recently admitted -1/15/21 for anemia, GIB, asthma/COPD exacerbation. On the day of discharge her hgb was 7.9. She now presents from [facility] for low hgb and shortness of breath. Hgb on 21 was 6.5 Labs this admission: hgb 7.2, wbc 18, ddimer 1.77, Cr 1.42, lactic acid 1.07, bnp 1443, pct 0.29, crp 5.04, covid 19 rapid and pcr negative. Images: CXR 21 shows interstitial edema and stable increased density left lung. New infiltrate right lung. ROS: Tells me she has been short of breath since her prior admission but this has increased over the last few days. Left-sided chest pain for the last couple days which is constant and reproducible to palpation. She c/o dizziness for about a week. BLE swelling and redness is at her baseline. She denies f/c, cough, blood in the stool Currently on her baseline 3L O2 via nc Interval hx: 
Feeling a little more SOB this morning. Still having abdominal pain and nausea 3L NC, o2 sats 98% I/O:  No ins documented Wbc 19-- increased 
hgb 7.3 Cr 1.22 Past Medical History:  
Diagnosis Date  Asthma  Atrial fibrillation ([facility] 11/16/2018  Autoimmune disease ([address]   
 fibromyalgia  CAD (coronary artery disease) 10/9/2015  Closed wedge compression fracture of T7 vertebra ([facility] 11/13/2018  COPD (chronic obstructive pulmonary disease) (HCC)  Diabetes ([facility]  DVT (deep vein thrombosis) in pregnancy  GERD (gastroesophageal reflux disease)  Heart failure ([facility]  History of vascular access device 09/30/2020  
 4f midline, 12cm at 1cm out placed in L Cephalic by Samira Nixon RN  
 HTN (hypertension)  NSTEMI (non-ST elevated myocardial infarction) ([facility] 10/9/2015  Obesity (BMI 30-39.9) 11/13/2018  PAD (peripheral artery disease) ([facility] prior stenting  Sleep apnea   
 wears CPAP  
 Statin intolerance Past Surgical History:  
Procedure Laterality Date  COLONOSCOPY N/A 1/4/2021 COLONOSCOPY performed by Cha Washburn MD at [facility]  HX COLOSTOMY    
 and reversal, post episiotomy repair [address]  HX UROLOGICAL  2009  
 bladder sling  IR KYPHOPLASTY LUMBAR  10/8/2020  [place] ABDOMEN SURGERY PROC UNLISTED    
 hital hernia repair, gall bladder removed  [place] AMPUTATION TRANSMETACARPAL Right 06/12/2019  
 entire ring finger, 2nd & 3rd fingers (transmetacarpal)  [place] BREAST SURGERY PROCEDURE UNLISTED    
 lumpectomy  [place] CARDIAC SURG PROCEDURE UNLIST  10/9/15  
 2 stents Family History Problem Relation Age of Onset  Diabetes Mother  Heart Failure Mother  Kidney Disease Mother  Diabetes Father  Heart Disease Father  Elevated Lipids Father  Heart Failure Father  Heart Disease Brother  Cancer Brother   
     kidney  Diabetes Brother  No Known Problems Brother  No Known Problems Brother Social History Tobacco Use  Smoking status: Former Smoker Quit date: 3/30/2017 Years since quitting: 3.8  Smokeless tobacco: Never Used Substance Use Topics  Alcohol use: No  
  Alcohol/week: 0.0 standard drinks Comment: very very rarely Allergies Allergen Reactions  Advair Diskus [Fluticasone Propion-Salmeterol] Rash  Aspartame Other (comments)  Breo Ellipta [Fluticasone Furoate-Vilanterol] Rash  Bumex [Bumetanide] Myalgia  Ciprofloxacin Rash  Statins-Hmg-Coa Reductase Inhibitors Other (comments) Muscle pain Lipitor/crestor/zocor  Sulfa (Sulfonamide Antibiotics) Rash  Tetracycline Other (comments) musclepain  Torsemide Rash and Myalgia  Zetia [Ezetimibe] Myalgia Current Facility-Administered Medications Medication Dose Route Frequency  predniSONE (DELTASONE) tablet 10 mg  10 mg Oral DAILY WITH BREAKFAST  dilTIAZem IR (CARDIZEM) tablet 60 mg  60 mg Oral AC&HS  nystatin (MYCOSTATIN) 100,000 unit/mL oral suspension 500,000 Units  500,000 Units Oral QID  potassium chloride SR (KLOR-CON 10) tablet 20 mEq  20 mEq Oral BID  
 amiodarone (CORDARONE) tablet 200 mg  200 mg Oral DAILY  furosemide (LASIX) injection 40 mg  40 mg IntraVENous DAILY  glucose chewable tablet 16 g  4 Tab Oral PRN  
 dextrose (D50W) injection syrg 12.5-25 g  25-50 mL IntraVENous PRN  
 glucagon (GLUCAGEN) injection 1 mg  1 mg IntraMUSCular PRN  
 insulin lispro (HUMALOG) injection   SubCUTAneous AC&HS  cefTRIAXone (ROCEPHIN) 1 g in sterile water (preservative free) 10 mL IV syringe  1 g IntraVENous Q24H  
 azithromycin (ZITHROMAX) 500 mg in 0.9% sodium chloride 250 mL (VIAL-MATE)  500 mg IntraVENous Q24H  
 0.9% sodium chloride infusion 250 mL  250 mL IntraVENous PRN  
 albuterol-ipratropium (DUO-NEB) 2.5 MG-0.5 MG/3 ML  3 mL Nebulization Q6H RT  
 arformoteroL (BROVANA) neb solution 15 mcg  15 mcg Nebulization BID RT  
 ondansetron (ZOFRAN) injection 4 mg  4 mg IntraVENous Q4H PRN  
 cyanocobalamin (VITAMIN B12) injection 1,000 mcg  1,000 mcg IntraMUSCular DAILY  lipase-protease-amylase (CREON 24,000) capsule 2 Cap  2 Cap Oral TID WITH MEALS  
 albuterol (PROVENTIL HFA, VENTOLIN HFA, PROAIR HFA) inhaler 2 Puff  2 Puff Inhalation Q4H PRN  
 apixaban (ELIQUIS) tablet 5 mg  5 mg Oral BID  aspirin chewable tablet 81 mg  81 mg Oral DAILY  cholecalciferol (VITAMIN D3) (1000 Units /25 mcg) tablet 2 Tab  2,000 Units Oral DAILY  cyanocobalamin (VITAMIN B12) tablet 1,000 mcg  1,000 mcg Oral DAILY  DULoxetine (CYMBALTA) capsule 60 mg  60 mg Oral DAILY  ferrous sulfate tablet 325 mg  325 mg Oral DAILY WITH BREAKFAST  mirtazapine (REMERON) tablet 15 mg  15 mg Oral QHS  montelukast (SINGULAIR) tablet 10 mg  10 mg Oral QPM  
 pantoprazole (PROTONIX) tablet 40 mg  40 mg Oral ACB  sodium chloride (NS) flush 5-40 mL  5-40 mL IntraVENous Q8H  
 sodium chloride (NS) flush 5-40 mL  5-40 mL IntraVENous PRN  
 acetaminophen (TYLENOL) tablet 650 mg  650 mg Oral Q6H PRN Or  
 acetaminophen (TYLENOL) suppository 650 mg  650 mg Rectal Q6H PRN  polyethylene glycol (MIRALAX) packet 17 g  17 g Oral DAILY PRN  
 guaiFENesin-dextromethorphan (ROBITUSSIN DM) 100-10 mg/5 mL syrup 5 mL  5 mL Oral Q4H PRN  
 
 
 
REVIEW OF SYSTEMS  
12 point ROS negative except as stated in the HPI. Physical Exam:  
Visit Vitals BP (!) 110/37 (BP 1 Location: Right upper arm, BP Patient Position: Supine) Pulse 98 Temp 98.1 °F (36.7 °C) Resp 20 Wt 111.9 kg (246 lb 9.6 oz) SpO2 96% BMI 38.62 kg/m² General:  Alert, cooperative, no distress, appears stated age. Head:  Normocephalic, without obvious abnormality, atraumatic. Eyes:  Conjunctivae/corneas clear. Nose: Nares normal. Septum midline. Mucosa normal.   
Throat: Lips, mucosa, and tongue normal.   
Neck: Supple, symmetrical, trachea midline Lungs:   few crackles. Chest wall:  Normal shape Heart:  Irregular rhythm. +murmur Abdomen:   Soft, non-tender.  Bowel sounds normal.  
 Extremities: BLE 3+ edema. Extremities atraumatic Pulses: 2+ and symmetric all extremities. Skin: BLE erythema, LLE warmth. BLE wounds, dressed Lymph nodes: Cervical, supraclavicular nodes normal.  
Neurologic: Grossly nonfocal  
 
 
Lab Results Component Value Date/Time Sodium 135 (L) 01/29/2021 04:34 AM  
 Potassium 4.6 01/29/2021 04:34 AM  
 Chloride 101 01/29/2021 04:34 AM  
 CO2 34 (H) 01/29/2021 04:34 AM  
 BUN 25 (H) 01/29/2021 04:34 AM  
 Creatinine 1.22 (H) 01/29/2021 04:34 AM  
 Glucose 155 (H) 01/29/2021 04:34 AM  
 Calcium 7.6 (L) 01/29/2021 04:34 AM  
 Magnesium 2.5 (H) 01/29/2021 04:34 AM  
 Phosphorus 2.2 (L) 01/03/2021 01:59 AM  
 Lactic acid 1.0 01/27/2021 01:59 AM  
 
 
Lab Results Component Value Date/Time WBC 19.3 (H) 01/29/2021 04:34 AM  
 HGB 7.3 (L) 01/29/2021 04:34 AM  
 PLATELET 246 12/09/8233 04:34 AM  
 MCV 99.6 (H) 01/29/2021 04:34 AM  
 
 
Lab Results Component Value Date/Time INR 1.1 01/27/2021 01:58 AM  
 aPTT 25.3 01/27/2021 01:58 AM  
 Alk. phosphatase 229 (H) 01/29/2021 04:34 AM  
 Protein, total 5.5 (L) 01/29/2021 04:34 AM  
 Albumin 2.0 (L) 01/29/2021 04:34 AM  
 Globulin 3.5 01/29/2021 04:34 AM  
 
 
Lab Results Component Value Date/Time Iron 16 (L) 09/29/2020 08:27 PM  
 TIBC 305 09/29/2020 08:27 PM  
 Iron % saturation 5 (L) 09/29/2020 08:27 PM  
 Ferritin 69 01/27/2021 01:58 AM  
 
 
Lab Results Component Value Date/Time C-Reactive protein 5.04 (H) 01/27/2021 01:58 AM  
 TSH 3.40 01/13/2021 11:29 AM  
  
 
No results found for: PH, PHI, PCO2, PCO2I, PO2, PO2I, HCO3, HCO3I, FIO2, FIO2I Lab Results Component Value Date/Time CK 18 (L) 05/21/2019 06:13 AM  
 CK-MB Index 4.4 (H) 11/13/2018 03:26 PM  
 Troponin-I, Qt. <0.05 01/27/2021 01:58 AM  
  
 
Lab Results Component Value Date/Time  Culture result: NO GROWTH 5 DAYS 01/01/2021 06:19 PM  
 Culture result: NO GROWTH 5 DAYS 11/05/2020 02:32 PM  
 Culture result: LIGHT MIXED SKIN NUBIA ISOLATED 11/02/2020 07:25 PM  
 
 
Lab Results Component Value Date/Time Hepatitis B surface Ag <0.10 05/30/2020 12:31 AM  
 
 
Lab Results Component Value Date/Time Vancomycin,trough 37.6 (HH) 05/19/2019 08:11 AM  
 CK 18 (L) 05/21/2019 06:13 AM  
 CK 25 (L) 11/13/2018 03:26 PM  
 
 
Lab Results Component Value Date/Time Color YELLOW/STRAW 01/01/2021 12:20 AM  
 Appearance CLEAR 01/01/2021 12:20 AM  
 Specific gravity 1.010 01/06/2019 04:42 AM  
 pH (UA) 5.5 01/01/2021 12:20 AM  
 Protein Negative 01/01/2021 12:20 AM  
 Glucose Negative 01/01/2021 12:20 AM  
 Ketone Negative 01/01/2021 12:20 AM  
 Bilirubin Negative 01/01/2021 12:20 AM  
 Blood Negative 01/01/2021 12:20 AM  
 Urobilinogen 0.2 01/01/2021 12:20 AM  
 Nitrites Positive (A) 01/01/2021 12:20 AM  
 Leukocyte Esterase Negative 01/01/2021 12:20 AM  
 WBC 0-4 01/01/2021 12:20 AM  
 RBC 0-5 01/01/2021 12:20 AM  
 Bacteria 1+ (A) 01/01/2021 12:20 AM  
 
 
IMPRESSION 
· Anemia · Shortness of breath--improving. Suspect due to volume overload · Abnormal cxr: shows interstitial edema and stable increased density left lung. New infiltrate right lung which may represent PNA vs. Edema · Cellulitis · NAIDA on CKD · Asthma, COPD not in acute exacerbation. On chronic prednisone 20mg daily · Chronic hypoxemic respiratory failure · afib on Eliquis · HFpEF 
· CAD 
· HAYES on CPAP 
· H/o DVT · PAD · GERD 
 
PLAN 
· Cont supp o2 for goal sats>88%-- on baseline 3L · cont IV lasix, monitor I/Os, BP 
· CXR tomorrow morning · Cards following · GI following · ceftriaxone, azithro · Increase prednisone back to 20mg daily (this is her chronic home dose) · scheduled combivent and pulmicort nebs in place of home spiriva/dulera · singulair · CPAP qhs [facility]

## 2021-01-29 NOTE — DIABETES MGMT
[address] [facility] 
 
FOLLOW-UP NOTE Presentation Ruby Lucero is a 70 y.o. female admitted 1/26/21 with shortness of breath.  
  
HX:  
Past Medical History (click to expand or collapse) Past Medical History:  
Diagnosis Date  Asthma    
 Atrial fibrillation ([facility] [address] 11/16/2018  Autoimmune disease ([facility] [address]    
  fibromyalgia  CAD (coronary artery disease) 10/9/2015  Closed wedge compression fracture of T7 vertebra ([facility] [address] 11/13/2018  COPD (chronic obstructive pulmonary disease) ([facility])    
 Diabetes ([facility] [address]    
 DVT (deep vein thrombosis) in pregnancy    
 GERD (gastroesophageal reflux disease)    
 Heart failure ([facility])    
 History of vascular access device 09/30/2020  
  4f midline, 12cm at 1cm out placed in L Cephalic by Kristian Beckman RN  
 HTN (hypertension)    
 NSTEMI (non-ST elevated myocardial infarction) ([facility] [address] 10/9/2015  Obesity (BMI 30-39.9) 11/13/2018  PAD (peripheral artery disease) ([facility])    
  prior stenting  Sleep apnea    
  wears CPAP  
 Statin intolerance    
  
  
Current clinical course has been uncomplicated.  
  
Diabetes: Patient has known Type 2 diabetes, diagnosed in 2007. Home diabetes medication regimen is Onglyza 5mg daily. Admission  and A1c 5.5% indicate good diabetes control. Subjective Patient laying in bed, alert and oriented. Patient reports that she feels \"about the same as yesterday\"    Patient reports decreased appetite. Objective Physical exam 
General Alert, oriented and in no acute distress. Conversant and cooperative. Vital Signs Visit Vitals BP (!) 110/37 (BP 1 Location: Right upper arm, BP Patient Position: Supine) Pulse 98 Temp 98.1 °F (36.7 °C) Resp 20 Wt 111.9 kg (246 lb 9.6 oz) SpO2 96% BMI 38.62 kg/m² Skin  Warm and dry Heart   Regular rate and rhythm. No murmurs, rubs or gallops Lungs  Clear to auscultation without rales or rhonchi Extremities No foot wounds Laboratory Lab Results Component Value Date/Time Hemoglobin A1c 5.5 01/27/2021 01:58 AM  
 
Lab Results Component Value Date/Time LDL, calculated 68.8 10/10/2015 05:00 AM  
 
Lab Results Component Value Date/Time Creatinine (POC) 1.1 08/01/2019 11:41 AM  
 Creatinine 1.22 (H) 01/29/2021 04:34 AM  
 
Lab Results Component Value Date/Time Sodium 135 (L) 01/29/2021 04:34 AM  
 Potassium 4.6 01/29/2021 04:34 AM  
 Chloride 101 01/29/2021 04:34 AM  
 CO2 34 (H) 01/29/2021 04:34 AM  
 Anion gap 0 (L) 01/29/2021 04:34 AM  
 Glucose 155 (H) 01/29/2021 04:34 AM  
 BUN 25 (H) 01/29/2021 04:34 AM  
 Creatinine 1.22 (H) 01/29/2021 04:34 AM  
 BUN/Creatinine ratio 20 01/29/2021 04:34 AM  
 GFR est AA 52 (L) 01/29/2021 04:34 AM  
 GFR est non-AA 43 (L) 01/29/2021 04:34 AM  
 Calcium 7.6 (L) 01/29/2021 04:34 AM  
 Bilirubin, total 0.5 01/29/2021 04:34 AM  
 Alk. phosphatase 229 (H) 01/29/2021 04:34 AM  
 Protein, total 5.5 (L) 01/29/2021 04:34 AM  
 Albumin 2.0 (L) 01/29/2021 04:34 AM  
 Globulin 3.5 01/29/2021 04:34 AM  
 A-G Ratio 0.6 (L) 01/29/2021 04:34 AM  
 ALT (SGPT) 34 01/29/2021 04:34 AM  
 
Lab Results Component Value Date/Time ALT (SGPT) 34 01/29/2021 04:34 AM  
 
 
 
Factors affecting BG pattern Factor Dose Comments Nutrition: 
Carb-controlled meals   
60 grams/meal    
Drugs: 
Vasopressor load Steroids HIV Epogen Blood transfusion(s) Other: n/a   
n/a Prednison 20 mg daily 
n/a 
  
  
  
A1cs inaccurate A1cs inaccurate Pain 0/10    
Infection Zithromax, Rocephin PNA Other: n/a n/a    
 
 
Blood glucose pattern Evaluation This 70year old female, with Type 2 diabetes, did achieve diabetes control prior to admission (PTA), as evidenced by admission BG of 262 and A1c of 5.5%. Based on assessment of diabetes self-care practices, interventions that warrant action while hospitalized include: [x]?        Healthy Eating                          
[x]? Being Active [x]? Monitoring                                
  
  
The patient would also benefit from diabetes self-management education and support JAIMEE IQBAL [facility]) after discharge. 
  
During this hospitalization, the patient has achieved inpatient blood glucose target of 100-180mg/dl. BG's have ranged  over the past 24 hours. Factors that have played a role include: 
[x]? Kidney dysfunction 
  
  
Basal insulin isn't in use.  
  
Bolus insulin isn't in use. Patient is eating meals. 
  
Corrective insulin is in use. Patient has not required any corrective insulin over the past 24 hours.   
  
To optimize BG control and support a positive health outcome, would utilize the Subcutaneous Insulin Order set (1720).   
Impression: It is suspected that corrective insulin alone will not be sufficient to maintain BG control at target. It is likely that patient will require low dose basal insulin to maintain BG at target. Assessment and Plan Nursing Diagnosis Risk for unstable blood glucose pattern Nursing Intervention Domain 1828 Decision-making Support Nursing Interventions Examined current inpatient diabetes control Explored factors facilitating and impeding inpatient management Recommendations Recommend: 
  
[x]? Use of Subcutaneous Insulin Order set (3839) Insulin Use Recommendation Basal                                      (Based on weight, BMI & GFR) Addresses basic metabolic needs If FBG is > 200 mg/dL, start Lantus 20 units daily Nutritional                                      (Based on CHO load) Addresses nutrition interventions If patient experiences pre-prandial BG > 200mg/dL, start Humalog 6 units with meals. Corrective                                       (Offset gaps in dosing) Useful in adjusting insulin dosing Correctional Scale for Normal Sensitivity 
  
200-249- 2units Humalog [phone] Humalog [phone] Humalog [phone] Humalog Over 400- 10units Humalog 
  
Do NOT hold for NPO; give in addition to meal time insulin dose. 
  
If patient does not eat, -give correction dose only.  
  
  
[x]? Referral to 
  
[x]? Diabetes Self-Management Training through [facility] (Phone [phone] to schedule appointment) Time Spent/ Billing Codes Total time spent with patient: 15 Minutes   I personally reviewed chart, notes, data and current medications in the medical record. I have personally examined and treated the patient at bedside during this period. [x] 11464 IP subsequent hospital care - 15 minutes MIKAYLA Melgoza Diabetes Clinical Nurse Specialist 
[facility] Access via [employer]

## 2021-01-29 NOTE — CONSULTS
Comprehensive Nutrition Assessment Type and Reason for Visit: Initial, Consult Nutrition Recommendations/Plan: 1. Continue with Consistent CHO diet order. 2. Will order chocolate Glucerna BID, [id number] and Ensure Pudding 1x/d each for additional kcal/protein intake while PO intakes are poor. Nutrition Assessment:     
1/29: 69 yo female admitted for SOB. PMHx: CAD, CHF, COPD, DM, GERD, HTN, PAD, chronic hypoxic respiratory failure on 3L home O2. Class II obese per BMI of 38. Weight hx in EMR indicates weight loss of 3.8kg over last 3 months (3% loss which is not significant for time frame). Spoke with pt at bedside, confirms having had weight loss since January 1st secondary to eating less. States her appetite has been good at [facility] but the portions are smaller than she is used to eating. C/o poor PO intakes since admission secondary to food being cold, not liking it. States she has not eaten anything since admission. Discussed ONS options, pt states she has had Boost in past and likes it, willing to try Glucerna, Ensure Pudding, and [id number]. Labs: , POC [id number]. Meds: Vit B 12 and D3, Remeron, lasix, Kcl, Humalog, Prednisone, Creon, FeSu. Malnutrition Assessment: 
Does not meet criteria at this time. Estimated Daily Nutrient Needs: 
Energy (kcal): 1986(REE 1655 x AF 1.2); Weight Used for Energy Requirements: Current Protein (g): 89(0.8-1gm/kg/d); Weight Used for Protein Requirements: Current Fluid (ml/day): 1986; Method Used for Fluid Requirements: 1 ml/kcal 
 
 
Nutrition Related Findings:  LBM 1/28 - loose; 3+ pitting BLE edema Wounds:   
Multiple Current Nutrition Therapies: DIET DIABETIC CONSISTENT CARB Regular Anthropometric Measures: 
· Height:  5' 7\" (170.2 cm) · Current Body Wt:  111.8 kg (246 lb 7.6 oz) · Admission Body Wt:      
· Usual Body Wt:       
· Ideal Body Wt:  135 lbs:  182.6 % · Adjusted Body Weight:   ; Weight Adjustment for: No adjustment · Adjusted BMI:      
· BMI Category:  Obese class 2 (BMI 35.0-39. 9) Nutrition Diagnosis:  
· Inadequate protein-energy intake related to inadequate protein-energy intake as evidenced by intake 0-25% Nutrition Interventions:  
Food and/or Nutrient Delivery: Continue current diet, Start oral nutrition supplement Nutrition Education and Counseling: No recommendations at this time Coordination of Nutrition Care: Continue to monitor while inpatient Goals: 
PO intakes > 50% meals + ONS within next 3-4 days Nutrition Monitoring and Evaluation:  
Behavioral-Environmental Outcomes:   
Food/Nutrient Intake Outcomes: Food and nutrient intake, Supplement intake Physical Signs/Symptoms Outcomes: Biochemical data, GI status, Weight Discharge Planning:   
Continue current diet Electronically signed by Mya Keene RD on 1/29/2021 Contact: [phone]

## 2021-01-30 NOTE — PROGRESS NOTES
Daily Progress Note: 1/30/2021 Giuseppe [place] Herrera MD 
 
Assessment/Plan: 1. Pneumonia  
     - continue IV antibiotics Azithromycin 500 mg IV q 24 hours and Ceftriaxone 1 gram IV q 24 hours 2. Shortness of breath (SOB) - with history of chronic hypoxic respiratory failure on 3 liters chronically - provide supplemental oxygen and continuous pulse oximetry monitoring 
  
3. Acute on chronic diastolic CHF (HFpEF) - diuresis per Cardiology - place on strict Is & Os/ daily weights to monitor fluid status closely 
     - ECHO results as under Data Review - little changed cardiac function 
     - cardiology consulted 
  
4. Negative for COVID 19 virus infection 
     - rapid COVID 19 test is negative  
     - order Vitamin C, D, and Zinc 
     - Dexamethasone 6 mg q 24 hours - weaned off 
  
5.  Leukocytosis 
     - monitor, antibiotics as above 
  
6. Anemia 
     - transfuse for hemoglobin < 7.0. 
     - repeat H/H 
     - recheck iron profile, B12, folate levels 
  
7. Uncontrolled type 2 DM with hyperglycemia - Humalog insulin correctional coverage, scheduled blood glucose checks and hemoglobin A1c level. 
  
8. Acute superimposed on chronic kidney disease stage 3 
     - monitor and tx as needed 9. Elevated alkaline phosphatase 
     - monitor and treat as needed 
  
10. Hypertension 
       - hold on home Lisinopril - HCTZ with elevated creatinine and soft BPs - restart as able. 
  
11. History of GI bleed - s/p upper GI endoscopy and colonoscopy on 1/4/2021 with Adenomatous polyp of transverse colon [D12.3] Candida esophagitis ([facility] [address] [B37.81] Gastritis and duodenitis [  
  - GI consulted 12. Obesity 
       - recommended weight loss, heart healthy diet, and lifestyle modification 13. GERD 
       - Protonix 
  
14. Paroxysmal atrial fibrillation (afib) 
       - on long term anticoagulation on Eliquis - tx per Cardiology 
  
VTE prophylaxis:  Plan as above 
  
CODE STATUS:  FULL CODE as discussed with patient who requests the same. Problem List: 
Problem List as of 1/30/2021 Date Reviewed: 11/2/2020 Codes Class Noted - Resolved Pneumonia ICD-10-CM: J18.9 ICD-9-CM: 872  1/26/2021 - Present SOB (shortness of breath) ICD-10-CM: R06.02 
ICD-9-CM: 786.05  1/26/2021 - Present Anemia ICD-10-CM: D64.9 ICD-9-CM: 285.9  1/26/2021 - Present Acute on chronic systolic CHF (congestive heart failure) (HCC) ICD-10-CM: G25.36 ICD-9-CM: 428.23, 428.0  1/26/2021 - Present Uncontrolled type 2 diabetes mellitus with hyperglycemia (HCC) ICD-10-CM: E11.65 ICD-9-CM: 250.02  1/26/2021 - Present Left lower lobe pneumonia ICD-10-CM: J18.9 ICD-9-CM: 823  1/1/2021 - Present Leukocytosis ICD-10-CM: B76.192 ICD-9-CM: 288.60  11/3/2020 - Present Chronic respiratory failure with hypoxia [facility]) ICD-10-CM: J96.11 
ICD-9-CM: 518.83, 799.02  11/2/2020 - Present Overview Signed 11/2/2020 10:12 PM by [place] Saunders MD  
  On 3L NC at home Cellulitis of lower extremity ICD-10-CM: L03.119 ICD-9-CM: 682.6  3/23/2020 - Present ASO (arteriosclerosis obliterans) ICD-10-CM: I70.90 ICD-9-CM: 440.9  9/5/2019 - Present PVD (peripheral vascular disease) ([facility] [address] ICD-10-CM: I73.9 ICD-9-CM: 443.9  8/1/2019 - Present Severe obesity ([facility] [address] ICD-10-CM: E66.01 
ICD-9-CM: 278.01  7/30/2019 - Present COPD (chronic obstructive pulmonary disease) (HCC) ICD-10-CM: J44.9 ICD-9-CM: 324  7/13/2019 - Present Normocytic anemia ICD-10-CM: D64.9 ICD-9-CM: 285.9  7/13/2019 - Present PAD (peripheral artery disease) (HCC) ICD-10-CM: I73.9 ICD-9-CM: 443.9  7/13/2019 - Present Mild intermittent asthma ICD-10-CM: J45.20 ICD-9-CM: 493.90  5/17/2019 - Present Brachial artery thrombus (HCC) ICD-10-CM: K33.9 ICD-9-CM: 444.21  4/26/2019 - Present Sleep apnea ICD-10-CM: G47.30 ICD-9-CM: 780.57  1/5/2019 - Present Overview Signed 1/5/2019  8:41 PM by Chani Fuller DO  
  wears CPAP Atrial fibrillation [facility]) ICD-10-CM: I48.91 
ICD-9-CM: 427.31  11/16/2018 - Present Obesity (BMI 30-39.9) (Chronic) ICD-10-CM: E38.3 ICD-9-CM: 278.00  11/13/2018 - Present Recurrent deep vein thrombosis (DVT) (HCC) ICD-10-CM: I82.409 ICD-9-CM: 453.40  3/30/2018 - Present Chronic anticoagulation (Chronic) ICD-10-CM: Z79.01 
ICD-9-CM: V58.61  3/30/2018 - Present Essential hypertension (Chronic) ICD-10-CM: I10 
ICD-9-CM: 401.9  1/22/2016 - Present CAD (coronary artery disease) ICD-10-CM: I25.10 ICD-9-CM: 414.00  10/9/2015 - Present Type II diabetes mellitus (HCC) (Chronic) ICD-10-CM: E11.9 ICD-9-CM: 250.00  10/9/2015 - Present RESOLVED: Syncope and collapse ICD-10-CM: R55 
ICD-9-CM: 780.2  9/29/2020 - 11/2/2020 RESOLVED: Cellulitis ICD-10-CM: L03.90 ICD-9-CM: 682.9  3/23/2020 - 5/29/2020 RESOLVED: Hypokalemia ICD-10-CM: E87.6 ICD-9-CM: 276.8  3/23/2020 - 5/29/2020 RESOLVED: Hyponatremia ICD-10-CM: E87.1 ICD-9-CM: 276.1  3/23/2020 - 5/29/2020 RESOLVED: Diarrhea ICD-10-CM: R19.7 ICD-9-CM: 787.91  3/23/2020 - 5/29/2020 RESOLVED: Syncope and collapse ICD-10-CM: R55 
ICD-9-CM: 780.2  7/13/2019 - 5/29/2020 RESOLVED: UTI (urinary tract infection) ICD-10-CM: N39.0 ICD-9-CM: 599.0  7/13/2019 - 11/2/2020 RESOLVED: Sepsis ([address] ICD-10-CM: A41.9 ICD-9-CM: 038.9, 995.91  7/13/2019 - 11/3/2020 RESOLVED: Leg wound, left ICD-10-CM: P79.771Z ICD-9-CM: 891.0  7/13/2019 - 5/29/2020 RESOLVED: Leg laceration, right, initial encounter ICD-10-CM: H44.460S ICD-9-CM: 894.0  7/13/2019 - 5/29/2020 RESOLVED: Cellulitis of right upper arm ICD-10-CM: L03.113 ICD-9-CM: 682.3  5/17/2019 - 5/29/2020 RESOLVED: Gangrene of finger of right hand ([facility] ICD-10-CM: E24 
ICD-9-CM: 785.4  5/17/2019 - 11/2/2020 RESOLVED: Bowel obstruction ([facility] ICD-10-CM: A57.661 ICD-9-CM: 560.9  1/8/2019 - 5/29/2020 RESOLVED: Partial small bowel obstruction ([facility] ICD-10-CM: K56.600 ICD-9-CM: 560.9  1/5/2019 - 5/29/2020 RESOLVED: Dyspnea ICD-10-CM: R06.00 
ICD-9-CM: 786.09  11/13/2018 - 5/29/2020 RESOLVED: ARF (acute renal failure) ([facility]) ICD-10-CM: N17.9 ICD-9-CM: 584.9  11/13/2018 - 5/29/2020 RESOLVED: Closed wedge compression fracture of T7 vertebra ([facility] ICD-10-CM: S53.701O ICD-9-CM: 805.2  11/13/2018 - 5/29/2020 RESOLVED: Type 2 diabetes with nephropathy ([facility] ICD-10-CM: E11.21 
ICD-9-CM: 250.40, 583.81  10/1/2018 - 11/2/2020 RESOLVED: Chest pain ICD-10-CM: R07.9 ICD-9-CM: 786.50  6/27/2018 - 5/29/2020 RESOLVED: Abdominal pain ICD-10-CM: R10.9 ICD-9-CM: 789.00  6/27/2018 - 5/29/2020 RESOLVED: Coronary artery disease involving native coronary artery without angina pectoris (Chronic) ICD-10-CM: I25.10 ICD-9-CM: 414.01  1/22/2016 - 11/2/2020 RESOLVED: HTN (hypertension) ICD-10-CM: I10 
ICD-9-CM: 401.9  10/9/2015 - 1/22/2016 RESOLVED: NSTEMI (non-ST elevated myocardial infarction) ([facility] ICD-10-CM: I21.4 ICD-9-CM: 410.70  10/9/2015 - 5/29/2020 Subjective: 70 y.o. female with past medical history of chronic hypoxic respiratory failure (on 3 liters home oxygen), asthma, atrial fibrillation (Afib), autoimmune disease, fibromyalgia, CAD, heart failure (HFpEF), T7 vertebral compression fracture, COPD, type 2 DM, DVT, GERD, hypertension, NSTEMI, PAD, sleep apnea, obesity presented to the ED from home with chief complaints of shortness of breath (SOB) and low hemoglobin. Patient has chronic SOB but worsened. She was last hospitalized here from 1/1/2021 - 1/15/2021 with left lower lobe pneumonia, acute on chronic hypoxic respiratory failure, sepsis, GI bleed, anemia. Patient was transfused with 2 units PRBCs with hemoglobin = 4.9 on 1/1/2021. Last Hgb= 6.5 on 1/21/2021. On arrival in the ED today, initial recorded vital signs were BP = 139/47, HR= 82, RR= 22, O2sat= 94% on 4 liters O2 nasal cannula (NC). WBC = 18,400. proBNP = 1,443. Chest xray portable showed cardiomegaly, interstitial edema, and density in left lung base. Patient is now seen for admission to the hospitalist service for continued evaluation and treatments. There were no reports of new onset syncope, loss of consciousness, headache, neck pain, visual disturbance, numbness, paresthesias, focal weakness, chest pain, palpitations, abdominal pain, nausea, vomiting, diarrhea, melena, dysuria, hematuria, calf pain, increased leg swelling/ edema, fever, chills, rash, or known COVID 19 exposure. (Dr Montse Barney) 1/27:  Feeling better with less shortness of breath. Hb low so needs another transfusion. No clear source of bleeding. Covid negative. GI to see. 1/28:  Feels some better except sore throat - looks like thrush. Less shortness of breath. Hb up to 7.9 this AM after 1 unit PRC. K+ sl low - replacing. :  Little change from yesterday.  BP continues to be on the low side and pt reports feeling lightheaded with sitting up - Cards adjusting meds.  Hb down to 7.3 - watching - will likely need another transfusion.  WBC 19K - likely due to steroids - weaning.  
 
:  Reports feeling better and no longer lightheaded.  Cards has adjusted meds  Afeb.  Will do 1-2 more days of IV antibiotics.   
 
Review of Systems:  
A comprehensive review of systems was negative except for that written in the HPI. 
 
Objective:  
Physical Exam:  
Visit Vitals 
BP (!) 121/53 (BP 1 Location: Right upper arm, BP Patient Position: Supine)  
Pulse 86  
Temp 97.9 °F (36.6 °C)  
Resp 24  
Ht 5' 7\" (1.702 m)  
Wt 111.9 kg (246 lb 11.1 oz)  
SpO2 95%  
BMI 38.64 kg/m²  
 O2 Flow Rate (L/min): 3 l/min O2 Device: Nasal cannula 
Temp (24hrs), Av °F (36.7 °C), Min:97.8 °F (36.6 °C), Max:98.1 °F (36.7 °C) 
  1901 -  0700 
In: -  
Out: 150 [Urine:150]   701 - 1900 
In: 200 [P.O.:200] 
Out: 550 [Urine:550] 
General:  Alert, obese, cooperative, no distress, appears stated age.  
Head:  Normocephalic, without obvious abnormality, atraumatic.  
Eyes:  Conjunctivae/corneas clear.  EOMs intact.  
Throat: Lips, mucosa, and tongue moist; throat red with a few white patches on tongue.  
Neck: Supple, symmetrical, trachea midline, no adenopathy, thyroid: no enlargement/tenderness/nodules, no carotid bruit and no JVD.  
Lungs:   Clear to auscultation bilaterally.  
Chest wall:  No tenderness or deformity.  
Heart:  irreg with rate in 90s, no murmur, click, rub or gallop.  
Abdomen:   Soft, non-tender. Bowel sounds normal. No masses,  No organomegaly.  
Extremities: no cyanosis.  1+ LE edema on top of chronic stasis changes. No calf tenderness or cords.    
Pulses: 2+ and symmetric all extremities.  
Skin:  turgor normal. No rashes  
 Neurologic:   Alert and oriented X 3. Fine motor of hands and fingers normal.   equal.  No cogwheeling or rigidity. Gait not tested at this time. Sensation grossly normal to touch. Gross motor of extremities normal except generalized weakness. Data Review:  
ECHO 1/29/21 Interpretation Summary Result status: Final result · Pericardium: There is a moderate left pleural effusion. · LV: Estimated LVEF is 60 - 65%. Normal cavity size, wall thickness and systolic function (ejection fraction normal). · TV: Mild tricuspid valve regurgitation is present. Recent Days: 
Recent Labs  
  01/30/21 0446 01/29/21 
0434 01/28/21 
0455 WBC 17.2* 19.3* 17.0* HGB 7.4* 7.3* 7.9*  
HCT 27.6* 25.5* 27.5*  
 351 346 Recent Labs  
  01/30/21 0446 01/29/21 0434 01/28/21 
0455  135* 139  
K 4.5 4.6 3.3*  
 101 100 CO2 33* 34* 33* * 155* 126* BUN 25* 25* 26* CREA 1.34* 1.22* 1.18* CA 7.6* 7.6* 7.4* MG  --  2.5* 2.5* ALB 2.1* 2.0* 2.1* TBILI 0.3 0.5 0.6 ALT 29 34 33 No results for input(s): PH, PCO2, PO2, HCO3, FIO2 in the last 72 hours. 24 Hour Results: 
Recent Results (from the past 24 hour(s)) GLUCOSE, POC Collection Time: 01/29/21  8:15 AM  
Result Value Ref Range Glucose (POC) 121 (H) 65 - 100 mg/dL Performed by Barbara Machuca (PCT) GLUCOSE, POC Collection Time: 01/29/21 11:00 AM  
Result Value Ref Range Glucose (POC) 88 65 - 100 mg/dL Performed by Anjelica Millan   
ECHO ADULT FOLLOW-UP OR LIMITED Collection Time: 01/29/21  2:50 PM  
Result Value Ref Range IVSd 0.88 0.6 - 0.9 cm LVIDd 5.01 3.9 - 5.3 cm LVIDs 3.08 cm  
 LVPWd 0.93 (A) 0.6 - 0.9 cm Left Atrium Major Axis 4.18 cm  
 LA Volume 46.11 22 - 52 mL  
 LA Area 4C 16.64 cm2 LA Vol 2C 43.32 22 - 52 mL  
 LA Vol 4C 39.32 22 - 52 mL  
 LA Volume DISK BP 42.68 22 - 52 mL  
 MV A Andrew 105.07 centimeter/second Mitral Valve E Wave Deceleration Time 166.59 ms  
 MV E Andrew 151.52 centimeter/second LV E' Lateral Velocity 12.00 centimeter/second LV E' Septal Velocity 10.75 centimeter/second Mitral Valve Pressure Half-time 48.31 ms  
 MVA (PHT) 4.55 cm2 Pulmonic Valve Systolic Peak Instantaneous Gradient 8.62 mmHg Pulmonic Valve Max Velocity 146.76 cm/s Tapse 1.80 1.5 - 2.0 cm Triscuspid Valve Regurgitation Peak Gradient 41.50 mmHg  
 TR Max Velocity 322.08 cm/s  
 LV Mass .9 67 - 162 g  
 LV Mass AL Index 72.3 43 - 95 g/m2 Left Atrium Minor Axis 1.89 cm LA Vol Index 20.86 16 - 28 ml/m2 LA Vol Index 19.60 16 - 28 ml/m2 LA Vol Index 17.79 16 - 28 ml/m2 GLUCOSE, POC Collection Time: 01/29/21  4:44 PM  
Result Value Ref Range Glucose (POC) 123 (H) 65 - 100 mg/dL Performed by Eliceo De La Rosa (CON) GLUCOSE, POC Collection Time: 01/29/21  9:02 PM  
Result Value Ref Range Glucose (POC) 179 (H) 65 - 100 mg/dL Performed by uromovie (PCT) CBC WITH AUTOMATED DIFF Collection Time: 01/30/21  4:46 AM  
Result Value Ref Range WBC 17.2 (H) 3.6 - 11.0 K/uL  
 RBC 2.69 (L) 3.80 - 5.20 M/uL HGB 7.4 (L) 11.5 - 16.0 g/dL HCT 27.6 (L) 35.0 - 47.0 % .6 (H) 80.0 - 99.0 FL  
 MCH 27.5 26.0 - 34.0 PG  
 MCHC 26.8 (L) 30.0 - 36.5 g/dL  
 RDW 18.3 (H) 11.5 - 14.5 % PLATELET 633 589 - 829 K/uL MPV 10.3 8.9 - 12.9 FL  
 NRBC 0.1 (H) 0  WBC ABSOLUTE NRBC 0.02 (H) 0.00 - 0.01 K/uL NEUTROPHILS PENDING % LYMPHOCYTES PENDING % MONOCYTES PENDING % EOSINOPHILS PENDING % BASOPHILS PENDING % IMMATURE GRANULOCYTES PENDING %  
 ABS. NEUTROPHILS PENDING K/UL  
 ABS. LYMPHOCYTES PENDING K/UL  
 ABS. MONOCYTES PENDING K/UL  
 ABS. EOSINOPHILS PENDING K/UL  
 ABS. BASOPHILS PENDING K/UL  
 ABS. IMM. GRANS. PENDING K/UL  
 DF PENDING   
METABOLIC PANEL, COMPREHENSIVE Collection Time: 01/30/21  4:46 AM  
Result Value Ref Range Sodium 138 136 - 145 mmol/L Potassium 4.5 3.5 - 5.1 mmol/L Chloride 103 97 - 108 mmol/L  
 CO2 33 (H) 21 - 32 mmol/L Anion gap 2 (L) 5 - 15 mmol/L Glucose 163 (H) 65 - 100 mg/dL BUN 25 (H) 6 - 20 MG/DL Creatinine 1.34 (H) 0.55 - 1.02 MG/DL  
 BUN/Creatinine ratio 19 12 - 20 GFR est AA 47 (L) >60 ml/min/1.73m2 GFR est non-AA 39 (L) >60 ml/min/1.73m2 Calcium 7.6 (L) 8.5 - 10.1 MG/DL Bilirubin, total 0.3 0.2 - 1.0 MG/DL  
 ALT (SGPT) 29 12 - 78 U/L  
 AST (SGOT) 24 15 - 37 U/L Alk. phosphatase 236 (H) 45 - 117 U/L Protein, total 5.5 (L) 6.4 - 8.2 g/dL Albumin 2.1 (L) 3.5 - 5.0 g/dL Globulin 3.4 2.0 - 4.0 g/dL A-G Ratio 0.6 (L) 1.1 - 2.2 Medications reviewed Current Facility-Administered Medications Medication Dose Route Frequency  dilTIAZem IR (CARDIZEM) tablet 60 mg  60 mg Oral AC&HS  predniSONE (DELTASONE) tablet 20 mg  20 mg Oral DAILY WITH BREAKFAST  azithromycin (ZITHROMAX) tablet 500 mg  500 mg Oral QPM  
 nystatin (MYCOSTATIN) 100,000 unit/mL oral suspension 500,000 Units  500,000 Units Oral QID  potassium chloride SR (KLOR-CON 10) tablet 20 mEq  20 mEq Oral BID  
 amiodarone (CORDARONE) tablet 200 mg  200 mg Oral DAILY  furosemide (LASIX) injection 40 mg  40 mg IntraVENous DAILY  glucose chewable tablet 16 g  4 Tab Oral PRN  
 dextrose (D50W) injection syrg 12.5-25 g  25-50 mL IntraVENous PRN  
 glucagon (GLUCAGEN) injection 1 mg  1 mg IntraMUSCular PRN  
 insulin lispro (HUMALOG) injection   SubCUTAneous AC&HS  cefTRIAXone (ROCEPHIN) 1 g in sterile water (preservative free) 10 mL IV syringe  1 g IntraVENous Q24H  
 0.9% sodium chloride infusion 250 mL  250 mL IntraVENous PRN  
 albuterol-ipratropium (DUO-NEB) 2.5 MG-0.5 MG/3 ML  3 mL Nebulization Q6H RT  
 arformoteroL (BROVANA) neb solution 15 mcg  15 mcg Nebulization BID RT  
 ondansetron (ZOFRAN) injection 4 mg  4 mg IntraVENous Q4H PRN  
  lipase-protease-amylase (CREON 24,000) capsule 2 Cap  2 Cap Oral TID WITH MEALS  
 albuterol (PROVENTIL HFA, VENTOLIN HFA, PROAIR HFA) inhaler 2 Puff  2 Puff Inhalation Q4H PRN  
 apixaban (ELIQUIS) tablet 5 mg  5 mg Oral BID  cholecalciferol (VITAMIN D3) (1000 Units /25 mcg) tablet 2 Tab  2,000 Units Oral DAILY  cyanocobalamin (VITAMIN B12) tablet 1,000 mcg  1,000 mcg Oral DAILY  DULoxetine (CYMBALTA) capsule 60 mg  60 mg Oral DAILY  ferrous sulfate tablet 325 mg  325 mg Oral DAILY WITH BREAKFAST  mirtazapine (REMERON) tablet 15 mg  15 mg Oral QHS  montelukast (SINGULAIR) tablet 10 mg  10 mg Oral QPM  
 pantoprazole (PROTONIX) tablet 40 mg  40 mg Oral ACB  sodium chloride (NS) flush 5-40 mL  5-40 mL IntraVENous Q8H  
 sodium chloride (NS) flush 5-40 mL  5-40 mL IntraVENous PRN  
 acetaminophen (TYLENOL) tablet 650 mg  650 mg Oral Q6H PRN Or  
 acetaminophen (TYLENOL) suppository 650 mg  650 mg Rectal Q6H PRN  polyethylene glycol (MIRALAX) packet 17 g  17 g Oral DAILY PRN  
 guaiFENesin-dextromethorphan (ROBITUSSIN DM) 100-10 mg/5 mL syrup 5 mL  5 mL Oral Q4H PRN Care Plan discussed with: Patient/GI/Cardiology and Nurse Total time spent with patient: 30 minutes.  
Oleg Terrell MD

## 2021-01-30 NOTE — PROGRESS NOTES
Bedside, Verbal and Written shift change report given to Darlene RN (oncoming nurse) by Garfield Carolina RN (offgoing nurse). Report included the following information SBAR, Kardex, ED Summary, Procedure Summary, Intake/Output, MAR, Recent Results and Med Rec Status.

## 2021-01-30 NOTE — PROGRESS NOTES
Traci Hedrick MD 
 
[address] [facility] [place], [address] [facility] [address] Office [phone],PGY [phone] 
 
 
2021 Admit Date: 2021 Admit Diagnosis: SOB (shortness of breath) [R06.02]; Anemia [D64.9]; Pneumonia [J18.9]; Chronic respiratory failure with hypoxia (HCC) [J96.11];Leukocytosis [D72.829]; Uncontrolled type 2 diabetes mellitus with hyperglycemia ([facility] [address] [E11.65]; Acute on chronic systolic CHF (congestive heart failure) ([facility] [address] [I50.23] NAME: Bianka Nava :  1945     MRN: [id number] Assessment/Plan: 
 
PAF Hypoxia/Resp Failure/ Pleural Effusion/PNA/COPD Acute on chronic HFpEF Hx of CAD Hx of HTN Recurrent DVT /PAD CKD Anemia Plan: 
Blood pressure improved on decreasing Cardizem dose/ changing to short acting. On Eliquis/Cordarone. On Lasix 40 mg daily IV Wt 253 lbs stable Please do not hesitate to contact us with questions or concerns. See note below for details. Traci Hedrick MD 
 
 
Subjective: Dyspnea - slightly improved. No CP 
 
ROS: 
(bold if positive, if negative) edema,  
 
 
Current Facility-Administered Medications Medication Dose Route Frequency  dilTIAZem IR (CARDIZEM) tablet 60 mg  60 mg Oral AC&HS  predniSONE (DELTASONE) tablet 20 mg  20 mg Oral DAILY WITH BREAKFAST  azithromycin (ZITHROMAX) tablet 500 mg  500 mg Oral QPM  
 nystatin (MYCOSTATIN) 100,000 unit/mL oral suspension 500,000 Units  500,000 Units Oral QID  potassium chloride SR (KLOR-CON 10) tablet 20 mEq  20 mEq Oral BID  
 amiodarone (CORDARONE) tablet 200 mg  200 mg Oral DAILY  furosemide (LASIX) injection 40 mg  40 mg IntraVENous DAILY  glucose chewable tablet 16 g  4 Tab Oral PRN  
 dextrose (D50W) injection syrg 12.5-25 g  25-50 mL IntraVENous PRN  
 glucagon (GLUCAGEN) injection 1 mg  1 mg IntraMUSCular PRN  
 insulin lispro (HUMALOG) injection   SubCUTAneous AC&HS  
  cefTRIAXone (ROCEPHIN) 1 g in sterile water (preservative free) 10 mL IV syringe  1 g IntraVENous Q24H  
 0.9% sodium chloride infusion 250 mL  250 mL IntraVENous PRN  
 albuterol-ipratropium (DUO-NEB) 2.5 MG-0.5 MG/3 ML  3 mL Nebulization Q6H RT  
 arformoteroL (BROVANA) neb solution 15 mcg  15 mcg Nebulization BID RT  
 ondansetron (ZOFRAN) injection 4 mg  4 mg IntraVENous Q4H PRN  
 lipase-protease-amylase (CREON 24,000) capsule 2 Cap  2 Cap Oral TID WITH MEALS  
 albuterol (PROVENTIL HFA, VENTOLIN HFA, PROAIR HFA) inhaler 2 Puff  2 Puff Inhalation Q4H PRN  
 apixaban (ELIQUIS) tablet 5 mg  5 mg Oral BID  cholecalciferol (VITAMIN D3) (1000 Units /25 mcg) tablet 2 Tab  2,000 Units Oral DAILY  cyanocobalamin (VITAMIN B12) tablet 1,000 mcg  1,000 mcg Oral DAILY  DULoxetine (CYMBALTA) capsule 60 mg  60 mg Oral DAILY  ferrous sulfate tablet 325 mg  325 mg Oral DAILY WITH BREAKFAST  mirtazapine (REMERON) tablet 15 mg  15 mg Oral QHS  montelukast (SINGULAIR) tablet 10 mg  10 mg Oral QPM  
 pantoprazole (PROTONIX) tablet 40 mg  40 mg Oral ACB  sodium chloride (NS) flush 5-40 mL  5-40 mL IntraVENous Q8H  
 sodium chloride (NS) flush 5-40 mL  5-40 mL IntraVENous PRN  
 acetaminophen (TYLENOL) tablet 650 mg  650 mg Oral Q6H PRN Or  
 acetaminophen (TYLENOL) suppository 650 mg  650 mg Rectal Q6H PRN  polyethylene glycol (MIRALAX) packet 17 g  17 g Oral DAILY PRN  
 guaiFENesin-dextromethorphan (ROBITUSSIN DM) 100-10 mg/5 mL syrup 5 mL  5 mL Oral Q4H PRN Physical Exam: 
Visit Vitals BP (!) 146/60 (BP 1 Location: Left upper arm, BP Patient Position: Supine) Pulse 70 Temp 97.6 °F (36.4 °C) Resp 20 Ht 5' 7\" (1.702 m) Wt 253 lb (114.8 kg) SpO2 99% BMI 39.63 kg/m² O2 Flow Rate (L/min): 4 l/min O2 Device: Nasal cannula Telemetry:  
 
Gen: Well-developed, well-nourished, On O2 NC, Obese Neck: Supple,difficult to appreciate JVD Resp: No accessory muscle use, Dec AE bilat Card: Regular Rate,Rythm,Normal S1, S2,2/6 sys murmur+ Abd:  Soft, BS+,  
LE: 2+ edema EKG: 
 
 
 
Cxray: 
 
LABS: 
 
 
 
Lab Results Component Value Date/Time WBC 17.2 (H) 01/30/2021 04:46 AM  
 HGB 7.4 (L) 01/30/2021 04:46 AM  
 HCT 27.6 (L) 01/30/2021 04:46 AM  
 PLATELET 469 89/82/8480 04:46 AM  
 .6 (H) 01/30/2021 04:46 AM  
 
Lab Results Component Value Date/Time Sodium 138 01/30/2021 04:46 AM  
 Potassium 4.5 01/30/2021 04:46 AM  
 Chloride 103 01/30/2021 04:46 AM  
 CO2 33 (H) 01/30/2021 04:46 AM  
 Anion gap 2 (L) 01/30/2021 04:46 AM  
 Glucose 163 (H) 01/30/2021 04:46 AM  
 BUN 25 (H) 01/30/2021 04:46 AM  
 Creatinine 1.34 (H) 01/30/2021 04:46 AM  
 BUN/Creatinine ratio 19 01/30/2021 04:46 AM  
 GFR est AA 47 (L) 01/30/2021 04:46 AM  
 GFR est non-AA 39 (L) 01/30/2021 04:46 AM  
 Calcium 7.6 (L) 01/30/2021 04:46 AM  
 
Lab Results Component Value Date/Time  CK 18 (L) 05/21/2019 06:13 AM  
 CK-MB Index 4.4 (H) 11/13/2018 03:26 PM  
 Troponin-I, Qt. <0.05 01/27/2021 01:58 AM  
 
 
Care Plan discussed with:  
Rema Orozco MD

## 2021-01-30 NOTE — PROGRESS NOTES
Problem: Risk for Spread of Infection Goal: Prevent transmission of infectious organism to others Description: Prevent the transmission of infectious organisms to other patients, staff members, and visitors. Outcome: Progressing Towards Goal 
  
Problem: Patient Education:  Go to Education Activity Goal: Patient/Family Education Outcome: Progressing Towards Goal 
  
Problem: Falls - Risk of 
Goal: *Absence of Falls Description: Document Genevieve Lozano Fall Risk and appropriate interventions in the flowsheet. Outcome: Progressing Towards Goal 
Note: Fall Risk Interventions: 
Mobility Interventions: Bed/chair exit alarm, Patient to call before getting OOB Medication Interventions: Bed/chair exit alarm, Patient to call before getting OOB Elimination Interventions: Call light in reach, Bed/chair exit alarm, Patient to call for help with toileting needs History of Falls Interventions: Bed/chair exit alarm, Door open when patient unattended

## 2021-01-31 NOTE — PROGRESS NOTES
Bedside and Verbal shift change report given to Ling Menendez RN (oncoming nurse) by DELMER Marin and Chanel Rizzo RN(offgoing nurse). Report included the following information SBAR, Kardex, Intake/Output, MAR, Recent Results, Med Rec Status and Cardiac Rhythm NSR.

## 2021-01-31 NOTE — PROGRESS NOTES
Bedside, Verbal and Written shift change report given to Darlene RN (oncoming nurse) by Ruby Daley RN (offgoing nurse). Report included the following information SBAR, Kardex, ED Summary, Procedure Summary, Intake/Output, MAR, Recent Results and Med Rec Status.

## 2021-01-31 NOTE — PROGRESS NOTES
Daily Progress Note: 1/31/2021 Jesse Sandhu MD 
 
Assessment/Plan: 1. Pneumonia  
     - continue IV antibiotics Azithromycin 500 mg IV q 24 hours and Ceftriaxone 1 gram IV q 24 hours 2. Shortness of breath (SOB) - with history of chronic hypoxic respiratory failure on 3 liters chronically - provide supplemental oxygen and continuous pulse oximetry monitoring 
  
3. Acute on chronic diastolic CHF (HFpEF) - diuresis per Cardiology - place on strict Is & Os/ daily weights to monitor fluid status closely 
     - ECHO results as under Data Review - little changed cardiac function 
     - cardiology consulted 
  
4. Negative for COVID 19 virus infection 
     - rapid COVID 19 test is negative  
     - order Vitamin C, D, and Zinc 
     - Dexamethasone 6 mg q 24 hours - weaned off 
  
5.  Leukocytosis 
     - monitor, antibiotics as above 
  
6. Anemia 
     - transfuse for hemoglobin < 7.0. 
     - repeat H/H 
     - recheck iron profile, B12, folate levels 
  
7. Uncontrolled type 2 DM with hyperglycemia - Humalog insulin correctional coverage, scheduled blood glucose checks and hemoglobin A1c level. 
  
8. Acute superimposed on chronic kidney disease stage 3 
     - monitor and tx as needed 9. Elevated alkaline phosphatase 
     - monitor and treat as needed 
  
10. Hypertension 
       - hold on home Lisinopril - HCTZ with elevated creatinine and soft BPs - restart as able. 
  
11. History of GI bleed - s/p upper GI endoscopy and colonoscopy on 1/4/2021 with Adenomatous polyp of transverse colon [D12.3] Candida esophagitis ([facility] [address] [B37.81] Gastritis and duodenitis [  
  - GI consulted 12. Obesity 
       - recommended weight loss, heart healthy diet, and lifestyle modification 13. GERD 
       - Protonix 
  
14. Paroxysmal atrial fibrillation (afib) 
       - on long term anticoagulation on Eliquis - tx per Cardiology 
  
VTE prophylaxis:  Plan as above 
  
CODE STATUS:  FULL CODE as discussed with patient who requests the same. Problem List: 
Problem List as of 1/31/2021 Date Reviewed: 11/2/2020 Codes Class Noted - Resolved Pneumonia ICD-10-CM: J18.9 ICD-9-CM: 499  1/26/2021 - Present SOB (shortness of breath) ICD-10-CM: R06.02 
ICD-9-CM: 786.05  1/26/2021 - Present Anemia ICD-10-CM: D64.9 ICD-9-CM: 285.9  1/26/2021 - Present Acute on chronic systolic CHF (congestive heart failure) (HCC) ICD-10-CM: B37.23 ICD-9-CM: 428.23, 428.0  1/26/2021 - Present Uncontrolled type 2 diabetes mellitus with hyperglycemia (HCC) ICD-10-CM: E11.65 ICD-9-CM: 250.02  1/26/2021 - Present Left lower lobe pneumonia ICD-10-CM: J18.9 ICD-9-CM: 298  1/1/2021 - Present Leukocytosis ICD-10-CM: Q11.277 ICD-9-CM: 288.60  11/3/2020 - Present Chronic respiratory failure with hypoxia [facility]) ICD-10-CM: J96.11 
ICD-9-CM: 518.83, 799.02  11/2/2020 - Present Overview Signed 11/2/2020 10:12 PM by Anthony Heredia MD  
  On 3L NC at home Cellulitis of lower extremity ICD-10-CM: L03.119 ICD-9-CM: 682.6  3/23/2020 - Present ASO (arteriosclerosis obliterans) ICD-10-CM: I70.90 ICD-9-CM: 440.9  9/5/2019 - Present PVD (peripheral vascular disease) ([facility] [address] ICD-10-CM: I73.9 ICD-9-CM: 443.9  8/1/2019 - Present Severe obesity ([facility] [address] ICD-10-CM: E66.01 
ICD-9-CM: 278.01  7/30/2019 - Present COPD (chronic obstructive pulmonary disease) (HCC) ICD-10-CM: J44.9 ICD-9-CM: 475  7/13/2019 - Present Normocytic anemia ICD-10-CM: D64.9 ICD-9-CM: 285.9  7/13/2019 - Present PAD (peripheral artery disease) (HCC) ICD-10-CM: I73.9 ICD-9-CM: 443.9  7/13/2019 - Present Mild intermittent asthma ICD-10-CM: J45.20 ICD-9-CM: 493.90  5/17/2019 - Present Brachial artery thrombus (HCC) ICD-10-CM: G21.1 ICD-9-CM: 444.21  4/26/2019 - Present Sleep apnea ICD-10-CM: G47.30 ICD-9-CM: 780.57  1/5/2019 - Present Overview Signed 1/5/2019  8:41 PM by Gregg Mclean, DO  
  wears CPAP Atrial fibrillation [facility]) ICD-10-CM: I48.91 
ICD-9-CM: 427.31  11/16/2018 - Present Obesity (BMI 30-39.9) (Chronic) ICD-10-CM: X38.3 ICD-9-CM: 278.00  11/13/2018 - Present Recurrent deep vein thrombosis (DVT) (HCC) ICD-10-CM: I82.409 ICD-9-CM: 453.40  3/30/2018 - Present Chronic anticoagulation (Chronic) ICD-10-CM: Z79.01 
ICD-9-CM: V58.61  3/30/2018 - Present Essential hypertension (Chronic) ICD-10-CM: I10 
ICD-9-CM: 401.9  1/22/2016 - Present CAD (coronary artery disease) ICD-10-CM: I25.10 ICD-9-CM: 414.00  10/9/2015 - Present Type II diabetes mellitus (HCC) (Chronic) ICD-10-CM: E11.9 ICD-9-CM: 250.00  10/9/2015 - Present RESOLVED: Syncope and collapse ICD-10-CM: R55 
ICD-9-CM: 780.2  9/29/2020 - 11/2/2020 RESOLVED: Cellulitis ICD-10-CM: L03.90 ICD-9-CM: 682.9  3/23/2020 - 5/29/2020 RESOLVED: Hypokalemia ICD-10-CM: E87.6 ICD-9-CM: 276.8  3/23/2020 - 5/29/2020 RESOLVED: Hyponatremia ICD-10-CM: E87.1 ICD-9-CM: 276.1  3/23/2020 - 5/29/2020 RESOLVED: Diarrhea ICD-10-CM: R19.7 ICD-9-CM: 787.91  3/23/2020 - 5/29/2020 RESOLVED: Syncope and collapse ICD-10-CM: R55 
ICD-9-CM: 780.2  7/13/2019 - 5/29/2020 RESOLVED: UTI (urinary tract infection) ICD-10-CM: N39.0 ICD-9-CM: 599.0  7/13/2019 - 11/2/2020 RESOLVED: Sepsis ([address] ICD-10-CM: A41.9 ICD-9-CM: 038.9, 995.91  7/13/2019 - 11/3/2020 RESOLVED: Leg wound, left ICD-10-CM: T31.193O ICD-9-CM: 891.0  7/13/2019 - 5/29/2020 RESOLVED: Leg laceration, right, initial encounter ICD-10-CM: O49.346C ICD-9-CM: 894.0  7/13/2019 - 5/29/2020 RESOLVED: Cellulitis of right upper arm ICD-10-CM: L03.113 ICD-9-CM: 682.3  5/17/2019 - 5/29/2020 RESOLVED: Gangrene of finger of right hand ([facility] ICD-10-CM: V74 
ICD-9-CM: 785.4  5/17/2019 - 11/2/2020 RESOLVED: Bowel obstruction ([facility] ICD-10-CM: X56.920 ICD-9-CM: 560.9  1/8/2019 - 5/29/2020 RESOLVED: Partial small bowel obstruction ([facility] ICD-10-CM: K56.600 ICD-9-CM: 560.9  1/5/2019 - 5/29/2020 RESOLVED: Dyspnea ICD-10-CM: R06.00 
ICD-9-CM: 786.09  11/13/2018 - 5/29/2020 RESOLVED: ARF (acute renal failure) ([facility]) ICD-10-CM: N17.9 ICD-9-CM: 584.9  11/13/2018 - 5/29/2020 RESOLVED: Closed wedge compression fracture of T7 vertebra ([facility] ICD-10-CM: Q41.628N ICD-9-CM: 805.2  11/13/2018 - 5/29/2020 RESOLVED: Type 2 diabetes with nephropathy ([facility] ICD-10-CM: E11.21 
ICD-9-CM: 250.40, 583.81  10/1/2018 - 11/2/2020 RESOLVED: Chest pain ICD-10-CM: R07.9 ICD-9-CM: 786.50  6/27/2018 - 5/29/2020 RESOLVED: Abdominal pain ICD-10-CM: R10.9 ICD-9-CM: 789.00  6/27/2018 - 5/29/2020 RESOLVED: Coronary artery disease involving native coronary artery without angina pectoris (Chronic) ICD-10-CM: I25.10 ICD-9-CM: 414.01  1/22/2016 - 11/2/2020 RESOLVED: HTN (hypertension) ICD-10-CM: I10 
ICD-9-CM: 401.9  10/9/2015 - 1/22/2016 RESOLVED: NSTEMI (non-ST elevated myocardial infarction) ([facility] ICD-10-CM: I21.4 ICD-9-CM: 410.70  10/9/2015 - 5/29/2020 Subjective: 70 y.o. female with past medical history of chronic hypoxic respiratory failure (on 3 liters home oxygen), asthma, atrial fibrillation (Afib), autoimmune disease, fibromyalgia, CAD, heart failure (HFpEF), T7 vertebral compression fracture, COPD, type 2 DM, DVT, GERD, hypertension, NSTEMI, PAD, sleep apnea, obesity presented to the ED from home with chief complaints of shortness of breath (SOB) and low hemoglobin. Patient has chronic SOB but worsened. She was last hospitalized here from 1/1/2021 - 1/15/2021 with left lower lobe pneumonia, acute on chronic hypoxic respiratory failure, sepsis, GI bleed, anemia. Patient was transfused with 2 units PRBCs with hemoglobin = 4.9 on 1/1/2021. Last Hgb= 6.5 on 1/21/2021. On arrival in the ED today, initial recorded vital signs were BP = 139/47, HR= 82, RR= 22, O2sat= 94% on 4 liters O2 nasal cannula (NC). WBC = 18,400. proBNP = 1,443. Chest xray portable showed cardiomegaly, interstitial edema, and density in left lung base. Patient is now seen for admission to the hospitalist service for continued evaluation and treatments. There were no reports of new onset syncope, loss of consciousness, headache, neck pain, visual disturbance, numbness, paresthesias, focal weakness, chest pain, palpitations, abdominal pain, nausea, vomiting, diarrhea, melena, dysuria, hematuria, calf pain, increased leg swelling/ edema, fever, chills, rash, or known COVID 19 exposure. (Dr Tiffany Colbert) 1/27:  Feeling better with less shortness of breath. Hb low so needs another transfusion. No clear source of bleeding. Covid negative. GI to see. 1/28:  Feels some better except sore throat - looks like thrush. Less shortness of breath. Hb up to 7.9 this AM after 1 unit PRC. K+ sl low - replacing. :  Little change from yesterday. BP continues to be on the low side and pt reports feeling lightheaded with sitting up - Cards adjusting meds. Hb down to 7.3 - watching - will likely need another transfusion. WBC 19K - likely due to steroids - weaning. :  Reports feeling better and no longer lightheaded. Cards has adjusted meds  Afeb. Will do 1-2 more days of IV antibiotics. :  No new complaints this AM.  Cards has signed off. BP remains better. Hb at 7.0 - given her hx will transfuse today. Risks v benefits discussed. Review of Systems: A comprehensive review of systems was negative except for that written in the HPI. Objective:  
Physical Exam:  
Visit Vitals BP (!) 140/63 (BP 1 Location: Right upper arm, BP Patient Position: Supine) Pulse 74 Temp 98.7 °F (37.1 °C) Resp 18 Ht 5' 7\" (1.702 m) Wt 114.8 kg (253 lb) SpO2 95% BMI 39.63 kg/m² O2 Flow Rate (L/min): 4 l/min O2 Device: Nasal cannula Temp (24hrs), Av.9 °F (36.6 °C), Min:97.4 °F (36.3 °C), Max:98.7 °F (37.1 °C) No intake/output data recorded.  07 -  1900 In: 360 [P.O.:360] Out: 1800 [Urine:1800] General:  Alert, obese, cooperative, no distress, appears stated age. Head:  Normocephalic, without obvious abnormality, atraumatic. Eyes:  Conjunctivae/corneas clear. EOMs intact. Throat: Lips, mucosa, and tongue moist; throat red with a few white patches on tongue. Neck: Supple, symmetrical, trachea midline, no adenopathy, thyroid: no enlargement/tenderness/nodules, no carotid bruit and no JVD. Lungs:   Clear to auscultation bilaterally. Chest wall:  No tenderness or deformity. Heart:  irreg with rate in 66G, no murmur, click, rub or gallop. Abdomen:   Soft, non-tender. Bowel sounds normal. No masses,  No organomegaly. Extremities: no cyanosis. 1+ LE edema on top of chronic stasis changes. No calf tenderness or cords. Pulses: 2+ and symmetric all extremities. Skin:  turgor normal. No rashes Neurologic:   Alert and oriented X 3.  equal.  No cogwheeling or rigidity. Gait not tested at this time. Sensation grossly normal to touch. Gross motor of extremities normal except generalized weakness. Data Review:  
ECHO 1/29/21 Interpretation Summary Result status: Final result · Pericardium: There is a moderate left pleural effusion. · LV: Estimated LVEF is 60 - 65%. Normal cavity size, wall thickness and systolic function (ejection fraction normal). · TV: Mild tricuspid valve regurgitation is present. Recent Days: 
Recent Labs  
  01/31/21 0347 01/30/21 0446 01/29/21 
0434 WBC 15.4* 17.2* 19.3* HGB 7.0* 7.4* 7.3* HCT 24.9* 27.6* 25.5*  
 319 351 Recent Labs  
  01/31/21 0347 01/30/21 0446 01/29/21 
0434  138 135* K 4.2 4.5 4.6  103 101 CO2 37* 33* 34* * 163* 155* BUN 28* 25* 25* CREA 1.27* 1.34* 1.22* CA 7.9* 7.6* 7.6*  
MG  --   --  2.5* ALB 2.2* 2.1* 2.0*  
TBILI 0.3 0.3 0.5 ALT 25 29 34 No results for input(s): PH, PCO2, PO2, HCO3, FIO2 in the last 72 hours. 24 Hour Results: 
Recent Results (from the past 24 hour(s)) GLUCOSE, POC Collection Time: 01/30/21 11:18 AM  
Result Value Ref Range Glucose (POC) 96 65 - 100 mg/dL Performed by Daquan Peralta (PCT) GLUCOSE, POC Collection Time: 01/30/21  4:01 PM  
Result Value Ref Range Glucose (POC) 176 (H) 65 - 100 mg/dL Performed by Daquan Peralta (PCT) GLUCOSE, POC Collection Time: 01/30/21  9:29 PM  
Result Value Ref Range Glucose (POC) 111 (H) 65 - 100 mg/dL Performed by Sheri Abraham   
CBC WITH AUTOMATED DIFF Collection Time: 01/31/21  3:47 AM  
Result Value Ref Range WBC 15.4 (H) 3.6 - 11.0 K/uL  
 RBC 2.49 (L) 3.80 - 5.20 M/uL HGB 7.0 (L) 11.5 - 16.0 g/dL HCT 24.9 (L) 35.0 - 47.0 %  .0 (H) 80.0 - 99.0 FL  
 MCH 28.1 26.0 - 34.0 PG  
 MCHC 28.1 (L) 30.0 - 36.5 g/dL  
 RDW 17.9 (H) 11.5 - 14.5 % PLATELET 026 923 - 389 K/uL MPV 10.2 8.9 - 12.9 FL  
 NRBC 0.1 (H) 0  WBC ABSOLUTE NRBC 0.02 (H) 0.00 - 0.01 K/uL NEUTROPHILS PENDING % LYMPHOCYTES PENDING % MONOCYTES PENDING % EOSINOPHILS PENDING % BASOPHILS PENDING % IMMATURE GRANULOCYTES PENDING %  
 ABS. NEUTROPHILS PENDING K/UL  
 ABS. LYMPHOCYTES PENDING K/UL  
 ABS. MONOCYTES PENDING K/UL  
 ABS. EOSINOPHILS PENDING K/UL  
 ABS. BASOPHILS PENDING K/UL  
 ABS. IMM. GRANS. PENDING K/UL  
 DF PENDING   
METABOLIC PANEL, COMPREHENSIVE Collection Time: 01/31/21  3:47 AM  
Result Value Ref Range Sodium 140 136 - 145 mmol/L Potassium 4.2 3.5 - 5.1 mmol/L Chloride 102 97 - 108 mmol/L  
 CO2 37 (H) 21 - 32 mmol/L Anion gap 1 (L) 5 - 15 mmol/L Glucose 181 (H) 65 - 100 mg/dL BUN 28 (H) 6 - 20 MG/DL Creatinine 1.27 (H) 0.55 - 1.02 MG/DL  
 BUN/Creatinine ratio 22 (H) 12 - 20 GFR est AA 50 (L) >60 ml/min/1.73m2 GFR est non-AA 41 (L) >60 ml/min/1.73m2 Calcium 7.9 (L) 8.5 - 10.1 MG/DL Bilirubin, total 0.3 0.2 - 1.0 MG/DL  
 ALT (SGPT) 25 12 - 78 U/L  
 AST (SGOT) 17 15 - 37 U/L Alk. phosphatase 235 (H) 45 - 117 U/L Protein, total 5.6 (L) 6.4 - 8.2 g/dL Albumin 2.2 (L) 3.5 - 5.0 g/dL Globulin 3.4 2.0 - 4.0 g/dL A-G Ratio 0.6 (L) 1.1 - 2.2 GLUCOSE, POC Collection Time: 01/31/21  6:25 AM  
Result Value Ref Range Glucose (POC) 175 (H) 65 - 100 mg/dL Performed by Binh Cortes Medications reviewed Current Facility-Administered Medications Medication Dose Route Frequency  dilTIAZem IR (CARDIZEM) tablet 60 mg  60 mg Oral AC&HS  predniSONE (DELTASONE) tablet 20 mg  20 mg Oral DAILY WITH BREAKFAST  nystatin (MYCOSTATIN) 100,000 unit/mL oral suspension 500,000 Units  500,000 Units Oral QID  potassium chloride SR (KLOR-CON 10) tablet 20 mEq  20 mEq Oral BID  
  amiodarone (CORDARONE) tablet 200 mg  200 mg Oral DAILY  furosemide (LASIX) injection 40 mg  40 mg IntraVENous DAILY  glucose chewable tablet 16 g  4 Tab Oral PRN  
 dextrose (D50W) injection syrg 12.5-25 g  25-50 mL IntraVENous PRN  
 glucagon (GLUCAGEN) injection 1 mg  1 mg IntraMUSCular PRN  
 insulin lispro (HUMALOG) injection   SubCUTAneous AC&HS  cefTRIAXone (ROCEPHIN) 1 g in sterile water (preservative free) 10 mL IV syringe  1 g IntraVENous Q24H  
 0.9% sodium chloride infusion 250 mL  250 mL IntraVENous PRN  
 albuterol-ipratropium (DUO-NEB) 2.5 MG-0.5 MG/3 ML  3 mL Nebulization Q6H RT  
 arformoteroL (BROVANA) neb solution 15 mcg  15 mcg Nebulization BID RT  
 ondansetron (ZOFRAN) injection 4 mg  4 mg IntraVENous Q4H PRN  
 lipase-protease-amylase (CREON 24,000) capsule 2 Cap  2 Cap Oral TID WITH MEALS  
 albuterol (PROVENTIL HFA, VENTOLIN HFA, PROAIR HFA) inhaler 2 Puff  2 Puff Inhalation Q4H PRN  
 apixaban (ELIQUIS) tablet 5 mg  5 mg Oral BID  cholecalciferol (VITAMIN D3) (1000 Units /25 mcg) tablet 2 Tab  2,000 Units Oral DAILY  cyanocobalamin (VITAMIN B12) tablet 1,000 mcg  1,000 mcg Oral DAILY  DULoxetine (CYMBALTA) capsule 60 mg  60 mg Oral DAILY  ferrous sulfate tablet 325 mg  325 mg Oral DAILY WITH BREAKFAST  mirtazapine (REMERON) tablet 15 mg  15 mg Oral QHS  montelukast (SINGULAIR) tablet 10 mg  10 mg Oral QPM  
 pantoprazole (PROTONIX) tablet 40 mg  40 mg Oral ACB  sodium chloride (NS) flush 5-40 mL  5-40 mL IntraVENous Q8H  
 sodium chloride (NS) flush 5-40 mL  5-40 mL IntraVENous PRN  
 acetaminophen (TYLENOL) tablet 650 mg  650 mg Oral Q6H PRN Or  
 acetaminophen (TYLENOL) suppository 650 mg  650 mg Rectal Q6H PRN  polyethylene glycol (MIRALAX) packet 17 g  17 g Oral DAILY PRN  
 guaiFENesin-dextromethorphan (ROBITUSSIN DM) 100-10 mg/5 mL syrup 5 mL  5 mL Oral Q4H PRN Care Plan discussed with: Patient/GI/Cardiology and Nurse Total time spent with patient: 30 minutes.  
Jean Mckeon MD

## 2021-01-31 NOTE — PROGRESS NOTES
Vernestine Mortimer, MD 
 
[address] [facility] [place], [address] [facility] [address] [phone],VARGAS [phone] 
 
 
2021 Admit Date: 2021 Admit Diagnosis: SOB (shortness of breath) [R06.02]; Anemia [D64.9]; Pneumonia [J18.9]; Chronic respiratory failure with hypoxia (HCC) [J96.11];Leukocytosis [D72.829]; Uncontrolled type 2 diabetes mellitus with hyperglycemia ([facility] [address] [E11.65]; Acute on chronic systolic CHF (congestive heart failure) ([facility] [I50.23] NAME: Luigi Garcia :  1945     MRN: [id number] Assessment/Plan: 
 
PAF Hypoxia/Resp Failure/ Pleural Effusion/PNA/COPD Acute on chronic HFpEF Hx of CAD Hx of HTN Recurrent DVT /PAD CKD Anemia Plan: 
Blood pressure improved on decreasing Cardizem dose/ changing to short acting. Now BP trending up. Can inc cardizem dose/ add norvasc for HTN On Eliquis/Cordarone. On Lasix 40 mg daily IV Please do not hesitate to contact us with questions or concerns. See note below for details. Vernestine Mortimer, MD 
 
 
Subjective: Dyspnea - slightly improved. No CP 
 
ROS: 
(bold if positive, if negative) edema,  
 
 
Current Facility-Administered Medications Medication Dose Route Frequency  0.9% sodium chloride infusion 250 mL  250 mL IntraVENous PRN  
 dilTIAZem IR (CARDIZEM) tablet 60 mg  60 mg Oral AC&HS  predniSONE (DELTASONE) tablet 20 mg  20 mg Oral DAILY WITH BREAKFAST  nystatin (MYCOSTATIN) 100,000 unit/mL oral suspension 500,000 Units  500,000 Units Oral QID  potassium chloride SR (KLOR-CON 10) tablet 20 mEq  20 mEq Oral BID  
 amiodarone (CORDARONE) tablet 200 mg  200 mg Oral DAILY  furosemide (LASIX) injection 40 mg  40 mg IntraVENous DAILY  glucose chewable tablet 16 g  4 Tab Oral PRN  
 dextrose (D50W) injection syrg 12.5-25 g  25-50 mL IntraVENous PRN  
 glucagon (GLUCAGEN) injection 1 mg  1 mg IntraMUSCular PRN  
  insulin lispro (HUMALOG) injection   SubCUTAneous AC&HS  cefTRIAXone (ROCEPHIN) 1 g in sterile water (preservative free) 10 mL IV syringe  1 g IntraVENous Q24H  
 0.9% sodium chloride infusion 250 mL  250 mL IntraVENous PRN  
 albuterol-ipratropium (DUO-NEB) 2.5 MG-0.5 MG/3 ML  3 mL Nebulization Q6H RT  
 arformoteroL (BROVANA) neb solution 15 mcg  15 mcg Nebulization BID RT  
 ondansetron (ZOFRAN) injection 4 mg  4 mg IntraVENous Q4H PRN  
 lipase-protease-amylase (CREON 24,000) capsule 2 Cap  2 Cap Oral TID WITH MEALS  
 albuterol (PROVENTIL HFA, VENTOLIN HFA, PROAIR HFA) inhaler 2 Puff  2 Puff Inhalation Q4H PRN  
 apixaban (ELIQUIS) tablet 5 mg  5 mg Oral BID  cholecalciferol (VITAMIN D3) (1000 Units /25 mcg) tablet 2 Tab  2,000 Units Oral DAILY  cyanocobalamin (VITAMIN B12) tablet 1,000 mcg  1,000 mcg Oral DAILY  DULoxetine (CYMBALTA) capsule 60 mg  60 mg Oral DAILY  ferrous sulfate tablet 325 mg  325 mg Oral DAILY WITH BREAKFAST  mirtazapine (REMERON) tablet 15 mg  15 mg Oral QHS  montelukast (SINGULAIR) tablet 10 mg  10 mg Oral QPM  
 pantoprazole (PROTONIX) tablet 40 mg  40 mg Oral ACB  sodium chloride (NS) flush 5-40 mL  5-40 mL IntraVENous Q8H  
 sodium chloride (NS) flush 5-40 mL  5-40 mL IntraVENous PRN  
 acetaminophen (TYLENOL) tablet 650 mg  650 mg Oral Q6H PRN Or  
 acetaminophen (TYLENOL) suppository 650 mg  650 mg Rectal Q6H PRN  polyethylene glycol (MIRALAX) packet 17 g  17 g Oral DAILY PRN  
 guaiFENesin-dextromethorphan (ROBITUSSIN DM) 100-10 mg/5 mL syrup 5 mL  5 mL Oral Q4H PRN Physical Exam: 
Visit Vitals BP (!) 142/57 (BP 1 Location: Right upper arm, BP Patient Position: Supine) Pulse 60 Temp 98.8 °F (37.1 °C) Resp 18 Ht 5' 7\" (1.702 m) Wt 253 lb (114.8 kg) SpO2 94% BMI 39.63 kg/m² O2 Flow Rate (L/min): 4 l/min O2 Device: Nasal cannula Telemetry:  
 
Gen: Well-developed, well-nourished, On O2 NC, Obese Neck: Supple,difficult to appreciate JVD Resp: No accessory muscle use, Dec AE bilat Card: Regular Rate,Rythm,Normal S1, S2,2/6 sys murmur+ Abd:  Soft, BS+,  
LE: 2+ edema EKG: 
 
 
 
Cxray: 
 
LABS: 
 
 
 
Lab Results Component Value Date/Time WBC 15.4 (H) 01/31/2021 03:47 AM  
 HGB 7.0 (L) 01/31/2021 03:47 AM  
 HCT 24.9 (L) 01/31/2021 03:47 AM  
 PLATELET 465 62/55/1899 03:47 AM  
 .0 (H) 01/31/2021 03:47 AM  
 
Lab Results Component Value Date/Time Sodium 140 01/31/2021 03:47 AM  
 Potassium 4.2 01/31/2021 03:47 AM  
 Chloride 102 01/31/2021 03:47 AM  
 CO2 37 (H) 01/31/2021 03:47 AM  
 Anion gap 1 (L) 01/31/2021 03:47 AM  
 Glucose 181 (H) 01/31/2021 03:47 AM  
 BUN 28 (H) 01/31/2021 03:47 AM  
 Creatinine 1.27 (H) 01/31/2021 03:47 AM  
 BUN/Creatinine ratio 22 (H) 01/31/2021 03:47 AM  
 GFR est AA 50 (L) 01/31/2021 03:47 AM  
 GFR est non-AA 41 (L) 01/31/2021 03:47 AM  
 Calcium 7.9 (L) 01/31/2021 03:47 AM  
 
Lab Results Component Value Date/Time  CK 18 (L) 05/21/2019 06:13 AM  
 CK-MB Index 4.4 (H) 11/13/2018 03:26 PM  
 Troponin-I, Qt. <0.05 01/27/2021 01:58 AM  
 
 
Care Plan discussed with:  
Bird Lyons MD

## 2021-01-31 NOTE — PROGRESS NOTES
0900 
Orders for blood transfusion. Obtained type and screen. , sent to lab. Lab to call when unit is ready.

## 2021-01-31 NOTE — PROGRESS NOTES
Problem: Risk for Spread of Infection Goal: Prevent transmission of infectious organism to others Description: Prevent the transmission of infectious organisms to other patients, staff members, and visitors. Outcome: Progressing Towards Goal 
  
Problem: Patient Education:  Go to Education Activity Goal: Patient/Family Education Outcome: Progressing Towards Goal 
  
Problem: Falls - Risk of 
Goal: *Absence of Falls Description: Document Earma Lolis Fall Risk and appropriate interventions in the flowsheet. Outcome: Progressing Towards Goal 
Note: Fall Risk Interventions: 
Mobility Interventions: Bed/chair exit alarm, Communicate number of staff needed for ambulation/transfer, OT consult for ADLs, PT Consult for mobility concerns, Strengthening exercises (ROM-active/passive) Medication Interventions: Bed/chair exit alarm Elimination Interventions: Bed/chair exit alarm, Call light in reach, Patient to call for help with toileting needs History of Falls Interventions: Bed/chair exit alarm Problem: Patient Education: Go to Patient Education Activity Goal: Patient/Family Education Outcome: Progressing Towards Goal

## 2021-02-01 NOTE — DIABETES MGMT
[facility] 
 
FOLLOW-UP NOTE Presentation Dl Dodd is a 70 y.o. female admitted 1/26/21 with shortness of breath.  
  
HX:  
Past Medical History (click to expand or collapse) Past Medical History:  
Diagnosis Date  Asthma    
 Atrial fibrillation ([facility] 11/16/2018  Autoimmune disease ([facility]    
  fibromyalgia  CAD (coronary artery disease) 10/9/2015  Closed wedge compression fracture of T7 vertebra ([facility] 11/13/2018  COPD (chronic obstructive pulmonary disease) ([facility])    
 Diabetes ([facility]    
 DVT (deep vein thrombosis) in pregnancy    
 GERD (gastroesophageal reflux disease)    
 Heart failure ([facility])    
 History of vascular access device 09/30/2020  
  4f midline, 12cm at 1cm out placed in L Cephalic by Luisa Lazar RN  
 HTN (hypertension)    
 NSTEMI (non-ST elevated myocardial infarction) ([facility] 10/9/2015  Obesity (BMI 30-39.9) 11/13/2018  PAD (peripheral artery disease) ([facility])    
  prior stenting  Sleep apnea    
  wears CPAP  
 Statin intolerance    
  
  
Current clinical course has been uncomplicated.  
  
Diabetes: Patient has known Type 2 diabetes, diagnosed in 2007. Home diabetes medication regimen is Onglyza 5mg daily. Admission  and A1c 5.5% indicate good diabetes control. Subjective Patient sitting up in bed, alert and oriented. Patient without complaints at this time. Objective Physical exam 
General Alert, oriented and in no acute distress. Conversant and cooperative. Vital Signs Visit Vitals BP (!) 143/58 (BP 1 Location: Right upper arm, BP Patient Position: Supine) Pulse 69 Temp 97.6 °F (36.4 °C) Resp 18 Ht 5' 7\" (1.702 m) Wt 114.8 kg (253 lb) SpO2 99% BMI 39.63 kg/m² Skin  Warm and dry Heart   Regular rate and rhythm. No murmurs, rubs or gallops Lungs  Clear to auscultation without rales or rhonchi Extremities No foot wounds Laboratory Lab Results Component Value Date/Time Hemoglobin A1c 5.5 01/27/2021 01:58 AM  
 
Lab Results Component Value Date/Time LDL, calculated 68.8 10/10/2015 05:00 AM  
 
Lab Results Component Value Date/Time Creatinine (POC) 1.1 08/01/2019 11:41 AM  
 Creatinine 1.22 (H) 02/01/2021 07:07 AM  
 
Lab Results Component Value Date/Time Sodium 140 02/01/2021 07:07 AM  
 Potassium 4.0 02/01/2021 07:07 AM  
 Chloride 99 02/01/2021 07:07 AM  
 CO2 39 (H) 02/01/2021 07:07 AM  
 Anion gap 2 (L) 02/01/2021 07:07 AM  
 Glucose 125 (H) 02/01/2021 07:07 AM  
 BUN 26 (H) 02/01/2021 07:07 AM  
 Creatinine 1.22 (H) 02/01/2021 07:07 AM  
 BUN/Creatinine ratio 21 (H) 02/01/2021 07:07 AM  
 GFR est AA 52 (L) 02/01/2021 07:07 AM  
 GFR est non-AA 43 (L) 02/01/2021 07:07 AM  
 Calcium 7.9 (L) 02/01/2021 07:07 AM  
 Bilirubin, total 0.4 02/01/2021 07:07 AM  
 Alk. phosphatase 238 (H) 02/01/2021 07:07 AM  
 Protein, total 5.6 (L) 02/01/2021 07:07 AM  
 Albumin 2.2 (L) 02/01/2021 07:07 AM  
 Globulin 3.4 02/01/2021 07:07 AM  
 A-G Ratio 0.6 (L) 02/01/2021 07:07 AM  
 ALT (SGPT) 22 02/01/2021 07:07 AM  
 
Lab Results Component Value Date/Time ALT (SGPT) 22 02/01/2021 07:07 AM  
 
 
 
Factors affecting BG pattern Factor Dose Comments Nutrition: 
Carb-controlled meals   
60 grams/meal    
Drugs: 
Vasopressor load Steroids HIV Epogen Blood transfusion(s) Other: n/a   
n/a Prednison 20 mg daily 
n/a 
  
  
  
A1cs inaccurate A1cs inaccurate Pain 0/10    
Infection Zithromax, Rocephin PNA Other: n/a n/a    
 
 
Blood glucose pattern Evaluation This 70year old female, with Type 2 diabetes, did achieve diabetes control prior to admission (PTA), as evidenced by admission BG of 262 and A1c of 5.5%. Based on assessment of diabetes self-care practices, interventions that warrant action while hospitalized include: 
            [x]? Healthy Eating [x]?        Being Active [x]? Monitoring                                
  
  
The patient would also benefit from diabetes self-management education and support JAIMEE IQBAL [facility]) after discharge. 
  
During this hospitalization, the patient has achieved inpatient blood glucose target of 100-180mg/dl. BG's have ranged 125-271 over the past 24 hours. Factors that have played a role include: 
[x]? Kidney dysfunction 
[x]? Glucocorticoid Use  
  
Basal insulin isn't in use.  
  
Bolus insulin isn't in use. Patient is eating meals. 
  
Corrective insulin is in use. Patient has required 5 units corrective insulin over the past 24 hours.   
  
To optimize BG control and support a positive health outcome, would utilize the Subcutaneous Insulin Order set (6770).   
Impression: As suspected, corrective insulin alone has not been sufficient to consistently maintain patient's BG at target. Patient home regimen is Onglyza daily. Would suspect patient needs to have low dose basal insulin to maintain BG control. Assessment and Plan Nursing Diagnosis Risk for unstable blood glucose pattern Nursing Intervention Domain 1059 Decision-making Support Nursing Interventions Examined current inpatient diabetes control Explored factors facilitating and impeding inpatient management Recommendations Recommend: 
  
[x]? Use of Subcutaneous Insulin Order set (0884) Insulin Use Recommendation Basal                                      (Based on weight, BMI & GFR) Addresses basic metabolic needs Lantus 20 units daily Nutritional                                      (Based on CHO load) Addresses nutrition interventions If patient experiences pre-prandial BG > 200mg/dL, start Humalog 6 units with meals.   
Corrective                                       (Offset gaps in dosing) Useful in adjusting insulin dosing Correctional Scale for Normal Sensitivity 
  
 200-249- 2units Humalog [phone] Humalog [phone] Humalog [phone] Humalog Over 400- 10units Humalog 
  
Do NOT hold for NPO; give in addition to meal time insulin dose. 
  
If patient does not eat, -give correction dose only.  
  
  
[x]? Referral to 
  
[x]? Diabetes Self-Management Training through [facility] (Phone [phone] to schedule appointment) Time Spent/ Billing Codes Total time spent with patient: 15 Minutes   I personally reviewed chart, notes, data and current medications in the medical record. I have personally examined and treated the patient at bedside during this period. [x] 25774 IP subsequent hospital care - 15 minutes MIKAYLA Cunha Diabetes Clinical Nurse Specialist 
[facility] Access via [employer]

## 2021-02-01 NOTE — PROGRESS NOTES
2/1/2021   CARE MANAGEMENT NOTE:  CM reviewed EMR for clinical updates and handoff received from ER  (Korin Draper.). Pt is a READMISSION. She was admitted to [facility] from 1/1/21 - 1/15/21 with PNA, and acute respiratory failure. She discharged to [facility]. On 1/26/21, pt was readmitted with PNA, CHF, anemia, and HTN. Reportedly, pt lived alone prior to admit to [address] [place] [address] for for SNF level of rehab. Dtr Gilmar Ndiaye ([phone]) is her primary family contact. RUR 50%; LOS 5 days Transition Plan of Care: 1. Plan is for pt to return to [facility] for continued rehab 2. Long term goal is to relocate into an assisted living facility 3. COVID 19 test (two tests within 24 hrs) for readmit to [address] [place] [address] will be required closer to discharge 4. Dtr to transport pt with portable oxygen tank vs [facility] at discharge CM will continue to follow pt until discharged. Jeanette

## 2021-02-01 NOTE — PROGRESS NOTES
Bedside and Verbal shift change report given to Bradly Israel RN (oncoming nurse) by DELMER Torrez and Sonia Tubbs RN (offgoing nurse). Report included the following information SBAR, Kardex, Accordion, Recent Results and Med Rec Status.

## 2021-02-01 NOTE — PROGRESS NOTES
Name: Jeanette Fernandez  
: 1945 Admit Date: 2021 Phone: [phone]  Room: [facility]/ PCP: Thalia De La Rosa MD  MRN: [id number] Date: 2021  Code: Full Code Chart and notes reviewed. Data reviewed. I review the patient's current medications in the medical record at each encounter. I have evaluated and examined the patient. HPI: 
 
1:42 PM    
 
History was obtained from patient, records review I was asked by Loring Dubin, MD to see Colletta Hummingbird in consultation for a chief complaint of SOB/PNA. History of Present Illness: 69 y/o F with PMH asthma/COPD (FEV1 51%)  on chronic prednisone 20mg daily, chronic hypoxemic respiratory failure (3L O2), HAYES on CPAP, afib, CAD, HFpEF, DM, DVT, PAD, CKD, GERD, obesity, fibromyalgia who was recently admitted -1/15/21 for anemia, GIB, asthma/COPD exacerbation. On the day of discharge her hgb was 7.9. She now presents from [facility] for low hgb and shortness of breath. Hgb on 21 was 6.5 Labs this admission: hgb 7.2, wbc 18, ddimer 1.77, Cr 1.42, lactic acid 1.07, bnp 1443, pct 0.29, crp 5.04, covid 19 rapid and pcr negative. Images: CXR 21 shows interstitial edema and stable increased density left lung. New infiltrate right lung. ROS: Tells me she has been short of breath since her prior admission but this has increased over the last few days. Left-sided chest pain for the last couple days which is constant and reproducible to palpation. She c/o dizziness for about a week. BLE swelling and redness is at her baseline. She denies f/c, cough, blood in the stool Currently on her baseline 3L O2 via nc Interval hx: 
Breathing is at her baseline Nausea and abd pain have resolved 3L NC, o2 sats 94% No outputs documented Past Medical History:  
Diagnosis Date  Asthma  Atrial fibrillation ([facility] 2018  [facility] fibromyalgia  CAD (coronary artery disease) 10/9/2015  Closed wedge compression fracture of T7 vertebra ([facility] 11/13/2018  COPD (chronic obstructive pulmonary disease) ([facility])  Diabetes ([facility]  DVT (deep vein thrombosis) in pregnancy  GERD (gastroesophageal reflux disease)  [facility]  History of vascular access device 09/30/2020  
 4f midline, 12cm at 1cm out placed in L Cephalic by Stefanie Colbert RN  
 HTN (hypertension)  NSTEMI (non-ST elevated myocardial infarction) ([facility] 10/9/2015  Obesity (BMI 30-39.9) 11/13/2018  PAD (peripheral artery disease) ([facility] prior stenting  Sleep apnea   
 wears CPAP  
 Statin intolerance Past Surgical History:  
Procedure Laterality Date  COLONOSCOPY N/A 1/4/2021 COLONOSCOPY performed by Jenny Stratton MD at [address] [facility]  HX COLOSTOMY    
 and reversal, post episiotomy repair [address]  HX UROLOGICAL  2009  
 bladder sling  IR KYPHOPLASTY LUMBAR  10/8/2020  IL ABDOMEN SURGERY PROC UNLISTED    
 hital hernia repair, gall bladder removed  IL AMPUTATION TRANSMETACARPAL Right 06/12/2019  
 entire ring finger, 2nd & 3rd fingers (transmetacarpal)  IL BREAST SURGERY PROCEDURE UNLISTED    
 lumpectomy  IL CARDIAC SURG PROCEDURE UNLIST  10/9/15  
 2 stents Family History Problem Relation Age of Onset  Diabetes Mother  Heart Failure Mother  Kidney Disease Mother  Diabetes Father  Heart Disease Father  Elevated Lipids Father  Heart Failure Father  Heart Disease Brother  Cancer Brother   
     kidney  Diabetes Brother  No Known Problems Brother  No Known Problems Brother Social History Tobacco Use  Smoking status: Former Smoker Quit date: 3/30/2017 Years since quitting: 3.8  Smokeless tobacco: Never Used Substance Use Topics  Alcohol use: No  
  Alcohol/week: 0.0 standard drinks Comment: very very rarely Allergies Allergen Reactions  Advair Diskus [Fluticasone Propion-Salmeterol] Rash  Aspartame Other (comments)  Breo Ellipta [Fluticasone Furoate-Vilanterol] Rash  Bumex [Bumetanide] Myalgia  Ciprofloxacin Rash  Statins-Hmg-Coa Reductase Inhibitors Other (comments) Muscle pain Lipitor/crestor/zocor  Sulfa (Sulfonamide Antibiotics) Rash  Tetracycline Other (comments) musclepain  Torsemide Rash and Myalgia  Zetia [Ezetimibe] Myalgia Current Facility-Administered Medications Medication Dose Route Frequency  dilTIAZem ER (CARDIZEM CD) capsule 240 mg  240 mg Oral DAILY  0.9% sodium chloride infusion 250 mL  250 mL IntraVENous PRN  predniSONE (DELTASONE) tablet 20 mg  20 mg Oral DAILY WITH BREAKFAST  nystatin (MYCOSTATIN) 100,000 unit/mL oral suspension 500,000 Units  500,000 Units Oral QID  potassium chloride SR (KLOR-CON 10) tablet 20 mEq  20 mEq Oral BID  
 amiodarone (CORDARONE) tablet 200 mg  200 mg Oral DAILY  furosemide (LASIX) injection 40 mg  40 mg IntraVENous DAILY  glucose chewable tablet 16 g  4 Tab Oral PRN  
 dextrose (D50W) injection syrg 12.5-25 g  25-50 mL IntraVENous PRN  
 glucagon (GLUCAGEN) injection 1 mg  1 mg IntraMUSCular PRN  
 insulin lispro (HUMALOG) injection   SubCUTAneous AC&HS  cefTRIAXone (ROCEPHIN) 1 g in sterile water (preservative free) 10 mL IV syringe  1 g IntraVENous Q24H  
 0.9% sodium chloride infusion 250 mL  250 mL IntraVENous PRN  
 albuterol-ipratropium (DUO-NEB) 2.5 MG-0.5 MG/3 ML  3 mL Nebulization Q6H RT  
 arformoteroL (BROVANA) neb solution 15 mcg  15 mcg Nebulization BID RT  
 ondansetron (ZOFRAN) injection 4 mg  4 mg IntraVENous Q4H PRN  
 lipase-protease-amylase (CREON 24,000) capsule 2 Cap  2 Cap Oral TID WITH MEALS  
 albuterol (PROVENTIL HFA, VENTOLIN HFA, PROAIR HFA) inhaler 2 Puff  2 Puff Inhalation Q4H PRN  
  apixaban (ELIQUIS) tablet 5 mg  5 mg Oral BID  cholecalciferol (VITAMIN D3) (1000 Units /25 mcg) tablet 2 Tab  2,000 Units Oral DAILY  cyanocobalamin (VITAMIN B12) tablet 1,000 mcg  1,000 mcg Oral DAILY  DULoxetine (CYMBALTA) capsule 60 mg  60 mg Oral DAILY  ferrous sulfate tablet 325 mg  325 mg Oral DAILY WITH BREAKFAST  mirtazapine (REMERON) tablet 15 mg  15 mg Oral QHS  montelukast (SINGULAIR) tablet 10 mg  10 mg Oral QPM  
 pantoprazole (PROTONIX) tablet 40 mg  40 mg Oral ACB  sodium chloride (NS) flush 5-40 mL  5-40 mL IntraVENous Q8H  
 sodium chloride (NS) flush 5-40 mL  5-40 mL IntraVENous PRN  
 acetaminophen (TYLENOL) tablet 650 mg  650 mg Oral Q6H PRN Or  
 acetaminophen (TYLENOL) suppository 650 mg  650 mg Rectal Q6H PRN  polyethylene glycol (MIRALAX) packet 17 g  17 g Oral DAILY PRN  
 guaiFENesin-dextromethorphan (ROBITUSSIN DM) 100-10 mg/5 mL syrup 5 mL  5 mL Oral Q4H PRN  
 
 
 
REVIEW OF SYSTEMS  
12 point ROS negative except as stated in the HPI. Physical Exam:  
Visit Vitals BP (!) 143/58 (BP 1 Location: Right upper arm, BP Patient Position: Supine) Pulse 69 Temp 97.6 °F (36.4 °C) Resp 18 Ht 5' 7\" (1.702 m) Wt 114.8 kg (253 lb) SpO2 99% BMI 39.63 kg/m² General:  Alert, cooperative, no distress, appears stated age. Head:  Normocephalic, without obvious abnormality, atraumatic. Eyes:  Conjunctivae/corneas clear. Nose: Nares normal. Septum midline. Mucosa normal.   
Throat: Lips, mucosa, and tongue normal.   
Neck: Supple, symmetrical, trachea midline Lungs:   CTA b/l Chest wall:  Normal shape Heart:  Irregular rhythm. +murmur Abdomen:   Soft, non-tender. Bowel sounds normal.  
Extremities: BLE 3+ edema. Extremities atraumatic Pulses: 2+ and symmetric all extremities. Skin: BLE erythema, LLE warmth. BLE wounds, dressed Lymph nodes: Cervical, supraclavicular nodes normal.  
Neurologic: Grossly nonfocal  
 
 
Lab Results Component Value Date/Time Sodium 140 02/01/2021 07:07 AM  
 Potassium 4.0 02/01/2021 07:07 AM  
 Chloride 99 02/01/2021 07:07 AM  
 CO2 39 (H) 02/01/2021 07:07 AM  
 BUN 26 (H) 02/01/2021 07:07 AM  
 Creatinine 1.22 (H) 02/01/2021 07:07 AM  
 Glucose 125 (H) 02/01/2021 07:07 AM  
 Calcium 7.9 (L) 02/01/2021 07:07 AM  
 Magnesium 2.5 (H) 01/29/2021 04:34 AM  
 Phosphorus 2.2 (L) 01/03/2021 01:59 AM  
 Lactic acid 1.0 01/27/2021 01:59 AM  
 
 
Lab Results Component Value Date/Time WBC 16.1 (H) 02/01/2021 07:07 AM  
 HGB 8.1 (L) 02/01/2021 07:07 AM  
 PLATELET 380 (H) 10/61/3581 07:07 AM  
 MCV 97.2 02/01/2021 07:07 AM  
 
 
Lab Results Component Value Date/Time INR 1.1 01/27/2021 01:58 AM  
 aPTT 25.3 01/27/2021 01:58 AM  
 Alk. phosphatase 238 (H) 02/01/2021 07:07 AM  
 Protein, total 5.6 (L) 02/01/2021 07:07 AM  
 Albumin 2.2 (L) 02/01/2021 07:07 AM  
 Globulin 3.4 02/01/2021 07:07 AM  
 
 
Lab Results Component Value Date/Time Iron 16 (L) 09/29/2020 08:27 PM  
 TIBC 305 09/29/2020 08:27 PM  
 Iron % saturation 5 (L) 09/29/2020 08:27 PM  
 Ferritin 69 01/27/2021 01:58 AM  
 
 
Lab Results Component Value Date/Time C-Reactive protein 5.04 (H) 01/27/2021 01:58 AM  
 TSH 3.40 01/13/2021 11:29 AM  
  
 
No results found for: PH, PHI, PCO2, PCO2I, PO2, PO2I, HCO3, HCO3I, FIO2, FIO2I Lab Results Component Value Date/Time CK 18 (L) 05/21/2019 06:13 AM  
 CK-MB Index 4.4 (H) 11/13/2018 03:26 PM  
 Troponin-I, Qt. <0.05 01/27/2021 01:58 AM  
  
 
Lab Results Component Value Date/Time Culture result: NO GROWTH 5 DAYS 01/01/2021 06:19 PM  
 Culture result: NO GROWTH 5 DAYS 11/05/2020 02:32 PM  
 Culture result: LIGHT MIXED SKIN NUBIA ISOLATED 11/02/2020 07:25 PM  
 
 
Lab Results Component Value Date/Time Hepatitis B surface Ag <0.10 05/30/2020 12:31 AM  
 
 
Lab Results Component Value Date/Time  Vancomycin,trough 37.6 (HH) 05/19/2019 08:11 AM  
 CK 18 (L) 05/21/2019 06:13 AM  
 CK 25 (L) 11/13/2018 03:26 PM  
 
 
Lab Results Component Value Date/Time Color YELLOW/STRAW 01/01/2021 12:20 AM  
 Appearance CLEAR 01/01/2021 12:20 AM  
 Specific gravity 1.010 01/06/2019 04:42 AM  
 pH (UA) 5.5 01/01/2021 12:20 AM  
 Protein Negative 01/01/2021 12:20 AM  
 Glucose Negative 01/01/2021 12:20 AM  
 Ketone Negative 01/01/2021 12:20 AM  
 Bilirubin Negative 01/01/2021 12:20 AM  
 Blood Negative 01/01/2021 12:20 AM  
 Urobilinogen 0.2 01/01/2021 12:20 AM  
 Nitrites Positive (A) 01/01/2021 12:20 AM  
 Leukocyte Esterase Negative 01/01/2021 12:20 AM  
 WBC 0-4 01/01/2021 12:20 AM  
 RBC 0-5 01/01/2021 12:20 AM  
 Bacteria 1+ (A) 01/01/2021 12:20 AM  
 
 
IMPRESSION 
· Anemia · Shortness of breath--improving. Suspect due to volume overload, PNA. · Abnormal cxr: shows interstitial edema and stable increased density left lung. New infiltrate right lung. · Cellulitis · NAIDA on CKD · Asthma, COPD not in acute exacerbation. On chronic prednisone 20mg daily · Chronic hypoxemic respiratory failure · afib on Eliquis · HFpEF 
· CAD 
· HAYES on CPAP 
· H/o DVT · PAD · GERD 
 
PLAN 
· Cont supp o2 for goal sats>88%-- on baseline 3L · cont IV lasix, monitor I/Os, BP 
· Cards following · GI following · Cont ceftriaxone (will complete tomorrow). Completed azithro · cont prednisone 20mg daily (this is her chronic home dose) · scheduled combivent and pulmicort nebs in place of home spiriva/dulera · singulair · CPAP qhs Will sign off and arrange OP pulm f/u Justa [place], [place]

## 2021-02-01 NOTE — ADVANCED PRACTICE NURSE
Bedside, Verbal and Written shift change report given to Ambrocio (oncoming nurse) by Maisha Sarmiento (offgoing nurse). Report included the following information SBAR, Kardex, ED Summary, Procedure Summary, Intake/Output, MAR, Recent Results and Med Rec Status.

## 2021-02-01 NOTE — PROGRESS NOTES
Problem: Risk for Spread of Infection Goal: Prevent transmission of infectious organism to others Description: Prevent the transmission of infectious organisms to other patients, staff members, and visitors. Outcome: Progressing Towards Goal 
  
Problem: Patient Education:  Go to Education Activity Goal: Patient/Family Education Outcome: Progressing Towards Goal 
  
Problem: Falls - Risk of 
Goal: *Absence of Falls Description: Document Billy Sol Fall Risk and appropriate interventions in the flowsheet. Outcome: Progressing Towards Goal 
Note: Fall Risk Interventions: 
Mobility Interventions: Bed/chair exit alarm Medication Interventions: Evaluate medications/consider consulting pharmacy Elimination Interventions: Call light in reach, Bed/chair exit alarm History of Falls Interventions: Bed/chair exit alarm

## 2021-02-01 NOTE — PROGRESS NOTES
Daily Progress Note: 2/1/2021 Giuseppe Winston London Herrera MD 
 
Assessment/Plan: 1. Pneumonia  
     - continue IV antibiotics Azithromycin 500 mg IV q 24 hours and Ceftriaxone 1 gram IV q 24 hours 2. Shortness of breath (SOB) - with history of chronic hypoxic respiratory failure on 3 liters chronically - provide supplemental oxygen and continuous pulse oximetry monitoring 
  
3. Acute on chronic diastolic CHF (HFpEF) - diuresis per Cardiology 
     - monitor fluid status closely 
     - ECHO results as under Data Review - little changed cardiac function 
     - cardiology consulted 
  
4. Negative for COVID 19 virus infection 
     - rapid COVID 19 test is negative  
     - order Vitamin C, D, and Zinc 
     - Dexamethasone 6 mg q 24 hours - weaned off 
  
5.  Leukocytosis 
     - monitor, antibiotics as above 
  
6. Anemia 
     - transfuse for hemoglobin < 7.0. 
     - repeat H/H 
     - recheck iron profile, B12, folate levels 
  
7. Uncontrolled type 2 DM with hyperglycemia - Humalog insulin correctional coverage, scheduled blood glucose checks and hemoglobin A1c level. 
  
8. Acute superimposed on chronic kidney disease stage 3 
     - monitor and tx as needed 9. Elevated alkaline phosphatase 
     - monitor and treat as needed 
  
10. Hypertension 
       - hold on home Lisinopril - HCTZ with elevated creatinine and soft BPs - restart as able. - On Amlodipine per cards 
  
11. History of GI bleed - s/p upper GI endoscopy and colonoscopy on 1/4/2021 with Adenomatous polyp of transverse colon [D12.3] Candida esophagitis ([facility] [address] [B37.81] Gastritis and duodenitis [  
  - GI consulted 12. Obesity 
       - recommended weight loss, heart healthy diet, and lifestyle modification 13. GERD 
       - Protonix 
  
14. Paroxysmal atrial fibrillation (afib) 
       - on long term anticoagulation on Eliquis - tx per Cardiology 
       - IR Cardizem VTE prophylaxis:  Plan as above 
  
CODE STATUS:  FULL CODE as discussed with patient who requests the same. Problem List: 
Problem List as of 2/1/2021 Date Reviewed: 11/2/2020 Codes Class Noted - Resolved Pneumonia ICD-10-CM: J18.9 ICD-9-CM: 976  1/26/2021 - Present SOB (shortness of breath) ICD-10-CM: R06.02 
ICD-9-CM: 786.05  1/26/2021 - Present Anemia ICD-10-CM: D64.9 ICD-9-CM: 285.9  1/26/2021 - Present Acute on chronic systolic CHF (congestive heart failure) (HCC) ICD-10-CM: G81.32 ICD-9-CM: 428.23, 428.0  1/26/2021 - Present Uncontrolled type 2 diabetes mellitus with hyperglycemia (HCC) ICD-10-CM: E11.65 ICD-9-CM: 250.02  1/26/2021 - Present Left lower lobe pneumonia ICD-10-CM: J18.9 ICD-9-CM: 411  1/1/2021 - Present Leukocytosis ICD-10-CM: V09.495 ICD-9-CM: 288.60  11/3/2020 - Present Chronic respiratory failure with hypoxia [facility]) ICD-10-CM: J96.11 
ICD-9-CM: 518.83, 799.02  11/2/2020 - Present Overview Signed 11/2/2020 10:12 PM by Amy Fernández MD  
  On 3L NC at home Cellulitis of lower extremity ICD-10-CM: L03.119 ICD-9-CM: 682.6  3/23/2020 - Present ASO (arteriosclerosis obliterans) ICD-10-CM: I70.90 ICD-9-CM: 440.9  9/5/2019 - Present PVD (peripheral vascular disease) ([facility] [address] ICD-10-CM: I73.9 ICD-9-CM: 443.9  8/1/2019 - Present Severe obesity ([facility] [address] ICD-10-CM: E66.01 
ICD-9-CM: 278.01  7/30/2019 - Present COPD (chronic obstructive pulmonary disease) (HCC) ICD-10-CM: J44.9 ICD-9-CM: 923  7/13/2019 - Present Normocytic anemia ICD-10-CM: D64.9 ICD-9-CM: 285.9  7/13/2019 - Present PAD (peripheral artery disease) (HCC) ICD-10-CM: I73.9 ICD-9-CM: 443.9  7/13/2019 - Present Mild intermittent asthma ICD-10-CM: J45.20 ICD-9-CM: 493.90  5/17/2019 - Present Brachial artery thrombus (HCC) ICD-10-CM: K68.2 ICD-9-CM: 444.21  4/26/2019 - Present Sleep apnea ICD-10-CM: G47.30 ICD-9-CM: 780.57  1/5/2019 - Present Overview Signed 1/5/2019  8:41 PM by Moises Barrios DO  
  wears CPAP Atrial fibrillation [facility]) ICD-10-CM: I48.91 
ICD-9-CM: 427.31  11/16/2018 - Present Obesity (BMI 30-39.9) (Chronic) ICD-10-CM: C31.5 ICD-9-CM: 278.00  11/13/2018 - Present Recurrent deep vein thrombosis (DVT) (HCC) ICD-10-CM: I82.409 ICD-9-CM: 453.40  3/30/2018 - Present Chronic anticoagulation (Chronic) ICD-10-CM: Z79.01 
ICD-9-CM: V58.61  3/30/2018 - Present Essential hypertension (Chronic) ICD-10-CM: I10 
ICD-9-CM: 401.9  1/22/2016 - Present CAD (coronary artery disease) ICD-10-CM: I25.10 ICD-9-CM: 414.00  10/9/2015 - Present Type II diabetes mellitus (HCC) (Chronic) ICD-10-CM: E11.9 ICD-9-CM: 250.00  10/9/2015 - Present RESOLVED: Syncope and collapse ICD-10-CM: R55 
ICD-9-CM: 780.2  9/29/2020 - 11/2/2020 RESOLVED: Cellulitis ICD-10-CM: L03.90 ICD-9-CM: 682.9  3/23/2020 - 5/29/2020 RESOLVED: Hypokalemia ICD-10-CM: E87.6 ICD-9-CM: 276.8  3/23/2020 - 5/29/2020 RESOLVED: Hyponatremia ICD-10-CM: E87.1 ICD-9-CM: 276.1  3/23/2020 - 5/29/2020 RESOLVED: Diarrhea ICD-10-CM: R19.7 ICD-9-CM: 787.91  3/23/2020 - 5/29/2020 RESOLVED: Syncope and collapse ICD-10-CM: R55 
ICD-9-CM: 780.2  7/13/2019 - 5/29/2020 RESOLVED: UTI (urinary tract infection) ICD-10-CM: N39.0 ICD-9-CM: 599.0  7/13/2019 - 11/2/2020 RESOLVED: Sepsis ([address] ICD-10-CM: A41.9 ICD-9-CM: 038.9, 995.91  7/13/2019 - 11/3/2020 RESOLVED: Leg wound, left ICD-10-CM: U09.802K ICD-9-CM: 891.0  7/13/2019 - 5/29/2020 RESOLVED: Leg laceration, right, initial encounter ICD-10-CM: V00.478Z ICD-9-CM: 894.0  7/13/2019 - 5/29/2020 RESOLVED: Cellulitis of right upper arm ICD-10-CM: L03.113 ICD-9-CM: 682.3  5/17/2019 - 5/29/2020 RESOLVED: Gangrene of finger of right hand ([facility] ICD-10-CM: D48 
ICD-9-CM: 785.4  5/17/2019 - 11/2/2020 RESOLVED: Bowel obstruction ([facility] ICD-10-CM: C88.208 ICD-9-CM: 560.9  1/8/2019 - 5/29/2020 RESOLVED: Partial small bowel obstruction ([facility] ICD-10-CM: K56.600 ICD-9-CM: 560.9  1/5/2019 - 5/29/2020 RESOLVED: Dyspnea ICD-10-CM: R06.00 
ICD-9-CM: 786.09  11/13/2018 - 5/29/2020 RESOLVED: ARF (acute renal failure) ([facility]) ICD-10-CM: N17.9 ICD-9-CM: 584.9  11/13/2018 - 5/29/2020 RESOLVED: Closed wedge compression fracture of T7 vertebra ([facility] ICD-10-CM: F16.179R ICD-9-CM: 805.2  11/13/2018 - 5/29/2020 RESOLVED: Type 2 diabetes with nephropathy ([facility] ICD-10-CM: E11.21 
ICD-9-CM: 250.40, 583.81  10/1/2018 - 11/2/2020 RESOLVED: Chest pain ICD-10-CM: R07.9 ICD-9-CM: 786.50  6/27/2018 - 5/29/2020 RESOLVED: Abdominal pain ICD-10-CM: R10.9 ICD-9-CM: 789.00  6/27/2018 - 5/29/2020 RESOLVED: Coronary artery disease involving native coronary artery without angina pectoris (Chronic) ICD-10-CM: I25.10 ICD-9-CM: 414.01  1/22/2016 - 11/2/2020 RESOLVED: HTN (hypertension) ICD-10-CM: I10 
ICD-9-CM: 401.9  10/9/2015 - 1/22/2016 RESOLVED: NSTEMI (non-ST elevated myocardial infarction) ([facility] ICD-10-CM: I21.4 ICD-9-CM: 410.70  10/9/2015 - 5/29/2020 Subjective: 70 y.o. female with past medical history of chronic hypoxic respiratory failure (on 3 liters home oxygen), asthma, atrial fibrillation (Afib), autoimmune disease, fibromyalgia, CAD, heart failure (HFpEF), T7 vertebral compression fracture, COPD, type 2 DM, DVT, GERD, hypertension, NSTEMI, PAD, sleep apnea, obesity presented to the ED from home with chief complaints of shortness of breath (SOB) and low hemoglobin. Patient has chronic SOB but worsened. She was last hospitalized here from 1/1/2021 - 1/15/2021 with left lower lobe pneumonia, acute on chronic hypoxic respiratory failure, sepsis, GI bleed, anemia. Patient was transfused with 2 units PRBCs with hemoglobin = 4.9 on 1/1/2021. Last Hgb= 6.5 on 1/21/2021. On arrival in the ED today, initial recorded vital signs were BP = 139/47, HR= 82, RR= 22, O2sat= 94% on 4 liters O2 nasal cannula (NC). WBC = 18,400. proBNP = 1,443. Chest xray portable showed cardiomegaly, interstitial edema, and density in left lung base. Patient is now seen for admission to the hospitalist service for continued evaluation and treatments. There were no reports of new onset syncope, loss of consciousness, headache, neck pain, visual disturbance, numbness, paresthesias, focal weakness, chest pain, palpitations, abdominal pain, nausea, vomiting, diarrhea, melena, dysuria, hematuria, calf pain, increased leg swelling/ edema, fever, chills, rash, or known COVID 19 exposure. (Dr Marlon Veliz) 1/27:  Feeling better with less shortness of breath. Hb low so needs another transfusion. No clear source of bleeding. Covid negative. GI to see. 1/28:  Feels some better except sore throat - looks like thrush. Less shortness of breath. Hb up to 7.9 this AM after 1 unit PRC. K+ sl low - replacing. :  Little change from yesterday. BP continues to be on the low side and pt reports feeling lightheaded with sitting up - Cards adjusting meds. Hb down to 7.3 - watching - will likely need another transfusion. WBC 19K - likely due to steroids - weaning. :  Reports feeling better and no longer lightheaded. Cards has adjusted meds  Afeb. Will do 1-2 more days of IV antibiotics. :  No new complaints this AM.  Cards has signed off. BP remains better. Hb at 7.0 - given her hx will transfuse today. Risks v benefits discussed. :  No new complaints. She reports she feels a little stronger. AM labs pending. Review of Systems: A comprehensive review of systems was negative except for that written in the HPI. Objective:  
Physical Exam:  
Visit Vitals BP (!) 143/58 (BP 1 Location: Right upper arm, BP Patient Position: Supine) Pulse 73 Temp 97.6 °F (36.4 °C) Resp 18 Ht 5' 7\" (1.702 m) Wt 114.8 kg (253 lb) SpO2 99% BMI 39.63 kg/m² O2 Flow Rate (L/min): 4 l/min O2 Device: Nasal cannula Temp (24hrs), Av °F (36.7 °C), Min:97.4 °F (36.3 °C), Max:98.9 °F (37.2 °C) No intake/output data recorded.  1901 -  0700 In: 310 Out: - General:  Alert, obese, cooperative, no distress, appears stated age. Head:  Normocephalic, without obvious abnormality, atraumatic. Eyes:  Conjunctivae/corneas clear. EOMs intact. Throat: Lips, mucosa, and tongue moist; throat red with a few white patches on tongue. Neck: Supple, symmetrical, trachea midline, no adenopathy, thyroid: no enlargement/tenderness/nodules, no carotid bruit and no JVD. Lungs:   Clear to auscultation bilaterally. Chest wall:  No tenderness or deformity. Heart:  irreg with rate in 72O, no murmur, click, rub or gallop. Abdomen:   Soft, non-tender. Bowel sounds normal. No masses,  No organomegaly. Extremities: no cyanosis. 1+ LE edema on top of chronic stasis changes. No calf tenderness or cords. Pulses: 2+ and symmetric all extremities. Skin:  turgor normal. No rashes Neurologic:   Alert and oriented X 3.  equal.  No cogwheeling or rigidity. Gait not tested at this time. Sensation grossly normal to touch. Gross motor of extremities normal except generalized weakness. Data Review:  
ECHO 1/29/21 Interpretation Summary Result status: Final result · Pericardium: There is a moderate left pleural effusion. · LV: Estimated LVEF is 60 - 65%. Normal cavity size, wall thickness and systolic function (ejection fraction normal). · TV: Mild tricuspid valve regurgitation is present. Recent Days: 
Recent Labs  
  01/31/21 0347 01/30/21 
0446 WBC 15.4* 17.2* HGB 7.0* 7.4* HCT 24.9* 27.6*  
 319 Recent Labs  
  01/31/21 0347 01/30/21 
0446  138  
K 4.2 4.5  
 103 CO2 37* 33* * 163* BUN 28* 25* CREA 1.27* 1.34* CA 7.9* 7.6* ALB 2.2* 2.1* TBILI 0.3 0.3 ALT 25 29 No results for input(s): PH, PCO2, PO2, HCO3, FIO2 in the last 72 hours. 24 Hour Results: 
Recent Results (from the past 24 hour(s)) RBC, ALLOCATE Collection Time: 01/31/21  8:45 AM  
Result Value Ref Range HISTORY CHECKED? Historical check performed TYPE & SCREEN Collection Time: 01/31/21  9:29 AM  
Result Value Ref Range Crossmatch Expiration 02/03/2021,2359 ABO/Rh(D) A POSITIVE Antibody screen NEG Unit number [id number] Blood component type RC LR Unit division 00 Status of unit ISSUED Crossmatch result Compatible GLUCOSE, POC Collection Time: 01/31/21 11:43 AM  
Result Value Ref Range Glucose (POC) 218 (H) 65 - 100 mg/dL Performed by Hyacinth Flannery GLUCOSE, POC Collection Time: 01/31/21  4:12 PM  
Result Value Ref Range Glucose (POC) 271 (H) 65 - 100 mg/dL Performed by Hyacinth Flannery GLUCOSE, POC Collection Time: 01/31/21  9:14 PM  
Result Value Ref Range Glucose (POC) 233 (H) 65 - 100 mg/dL Performed by Lorenso Burkitt GLUCOSE, POC Collection Time: 02/01/21  6:06 AM  
Result Value Ref Range Glucose (POC) 144 (H) 65 - 100 mg/dL Performed by Hunter Breaux Medications reviewed Current Facility-Administered Medications Medication Dose Route Frequency  0.9% sodium chloride infusion 250 mL  250 mL IntraVENous PRN  
 dilTIAZem IR (CARDIZEM) tablet 60 mg  60 mg Oral AC&HS  predniSONE (DELTASONE) tablet 20 mg  20 mg Oral DAILY WITH BREAKFAST  nystatin (MYCOSTATIN) 100,000 unit/mL oral suspension 500,000 Units  500,000 Units Oral QID  potassium chloride SR (KLOR-CON 10) tablet 20 mEq  20 mEq Oral BID  
 amiodarone (CORDARONE) tablet 200 mg  200 mg Oral DAILY  furosemide (LASIX) injection 40 mg  40 mg IntraVENous DAILY  glucose chewable tablet 16 g  4 Tab Oral PRN  
 dextrose (D50W) injection syrg 12.5-25 g  25-50 mL IntraVENous PRN  
 glucagon (GLUCAGEN) injection 1 mg  1 mg IntraMUSCular PRN  
 insulin lispro (HUMALOG) injection   SubCUTAneous AC&HS  cefTRIAXone (ROCEPHIN) 1 g in sterile water (preservative free) 10 mL IV syringe  1 g IntraVENous Q24H  
 0.9% sodium chloride infusion 250 mL  250 mL IntraVENous PRN  
 albuterol-ipratropium (DUO-NEB) 2.5 MG-0.5 MG/3 ML  3 mL Nebulization Q6H RT  
 arformoteroL (BROVANA) neb solution 15 mcg  15 mcg Nebulization BID RT  
 ondansetron (ZOFRAN) injection 4 mg  4 mg IntraVENous Q4H PRN  
 lipase-protease-amylase (CREON 24,000) capsule 2 Cap  2 Cap Oral TID WITH MEALS  
 albuterol (PROVENTIL HFA, VENTOLIN HFA, PROAIR HFA) inhaler 2 Puff  2 Puff Inhalation Q4H PRN  
 apixaban (ELIQUIS) tablet 5 mg  5 mg Oral BID  cholecalciferol (VITAMIN D3) (1000 Units /25 mcg) tablet 2 Tab  2,000 Units Oral DAILY  cyanocobalamin (VITAMIN B12) tablet 1,000 mcg  1,000 mcg Oral DAILY  DULoxetine (CYMBALTA) capsule 60 mg  60 mg Oral DAILY  ferrous sulfate tablet 325 mg  325 mg Oral DAILY WITH BREAKFAST  mirtazapine (REMERON) tablet 15 mg  15 mg Oral QHS  montelukast (SINGULAIR) tablet 10 mg  10 mg Oral QPM  
 pantoprazole (PROTONIX) tablet 40 mg  40 mg Oral ACB  sodium chloride (NS) flush 5-40 mL  5-40 mL IntraVENous Q8H  
 sodium chloride (NS) flush 5-40 mL  5-40 mL IntraVENous PRN  
 acetaminophen (TYLENOL) tablet 650 mg  650 mg Oral Q6H PRN Or  
 acetaminophen (TYLENOL) suppository 650 mg  650 mg Rectal Q6H PRN  polyethylene glycol (MIRALAX) packet 17 g  17 g Oral DAILY PRN  
 guaiFENesin-dextromethorphan (ROBITUSSIN DM) 100-10 mg/5 mL syrup 5 mL  5 mL Oral Q4H PRN Care Plan discussed with: Patient/GI/Cardiology and Nurse Total time spent with patient: 30 minutes.  
Kim Hernandez MD

## 2021-02-01 NOTE — PROGRESS NOTES
Cardiology Progress Note     [facility]   
NAME:  Audrey Ruiz :   1945 MRN:   [id number] Assessment/Plan: 1. PAF: remains in NSR- continue amiodarone, diltiazem changed to CD. eliquis 2. Chronic resp failure: on 3 liters oxygen baseline. 3. HF p EF: appears more euvolemic compared to last week, continue IV lasix today and transition to PO tomorrow. 1 mg bumex daily. I & Os not accurate? No weight recorded? EF p per echo this admission 4. Anemia 5. Probable PNA Reviewed above plan with the patient Uri Jessica NP [facility] Subjective:  
Audrey Ruiz is a 70 y.o. female with past medical history of COPD on 3 liters nasal canula oxygen OP,  paroxysmal atrial fibrillation,  autoimmune disease, fibromyalgia, CAD, HFpEF, obesity presented to the ED from home with chief complaints of shortness of breath (SOB) and low hemoglobin. Patient has chronic SOB but worsened. She was last hospitalized here from 2021 - 1/15/2021 with left lower lobe pneumonia, acute on chronic hypoxic respiratory failure, sepsis, GI bleed, anemia. proBNP = 1,443. Chest xray portable showed cardiomegaly, interstitial edema, and density in left lung base Cardiology reconsulted for BP 0815: Patients BP low at 103/41 with a MAP of 55 and HR of 73. Dr. Merna Sacks notified and orders to put in consult to cardiology. Cardiac ROS: Patient denies any  chest pain, breathing comfortably. Feels better compared to last week. hopefulyl going to [facility] on Wednesday? Previous Cardiac Eval 
20 ECHO ADULT COMPLETE 2020 Narrative · LV: Estimated LVEF is 60 - 65%. Visually measured ejection fraction. Normal cavity size and systolic function (ejection fraction normal). Mild  
concentric hypertrophy. Wall motion: normal. 
· AV: With no evidence of reduced excursion.  
   
  Signed by: Katherine Morales MD  
 
 XR Results (most recent): 
Results from Hospital Encounter encounter on 21  
XR CHEST PORT  
 Narrative INDICATION:    Heart Failure  
 
EXAMINATION:  AP CHEST, PORTABLE 
 
COMPARISON: 2021 
 
FINDINGS: Single AP portable view of the chest at 758 hours demonstrates 
evidence of a left pleural effusion with left lower lobe atelectasis. The 
cardiomediastinal silhouette is stable. There is no new airspace disease. There 
is chronic perihilar edema. 
  
 Impression No significant change. Mild pulmonary edema and left lower lobe atelectasis with 
left pleural effusion. 
  
 
 
 
 
 
 
 
 
Objective:  
 
Visit Vitals 
BP (!) 143/58 (BP 1 Location: Right upper arm, BP Patient Position: Supine)  
Pulse 69  
Temp 97.6 °F (36.4 °C)  
Resp 18  
Ht 5' 7\" (1.702 m)  
Wt 253 lb (114.8 kg)  
SpO2 99%  
BMI 39.63 kg/m²  
 O2 Flow Rate (L/min): 4 l/min O2 Device: Nasal cannula 
 
Temp (24hrs), Av °F (36.7 °C), Min:97.4 °F (36.3 °C), Max:98.9 °F (37.2 °C) 
 
 
No intake/output data recorded. 
 
 1901 -  0700 
In: 310  
Out: -  
TELE: NSR 
 
General: A& O x3, obese, elderly, NAD 
HEENT: Atraumatic. Pink and moist.  Anicteric sclerae. 
Neck : Supple 
Lungs:  Distant BS - exam limitied d/t body habitus  
Heart: regular rhythm 2/6 systolic 
Abdomen: obese, Soft, non-distended, non-tender. + Bowel sounds.  
Extremities: generalized edema. +1-2 yana LE, reddened/pink skin 
Neurologic: Grossly intact. No acute neurological distress.  
Psych: limited insight. Not anxious or agitated. 
 
 
Care Plan discussed with: 
  Comments  
Patient x   
Family     
RN x   
Care Manager    
               Consultant:  x Attending   
 
 
Data Review:  
 
No lab exists for component: ITNL  
No results for input(s): CPK, CKMB, TROIQ in the last 72 hours. 
Recent Labs  
  21 
0707 21 
0347 21 
0446  
 140 138  
K 4.0 4.2 4.5  
CL 99 102 103  
CO2 39* 37* 33*  
BUN 26* 28* 25*  
CREA 1.22* 1.27* 1.34*  
 * 181* 163* ALB 2.2* 2.2* 2.1* WBC 16.1* 15.4* 17.2* HGB 8.1* 7.0* 7.4* HCT 27.9* 24.9* 27.6* * 388 319 No results for input(s): INR, PTP, APTT, INREXT, INREXT in the last 72 hours. Medications reviewed Current Facility-Administered Medications Medication Dose Route Frequency  0.9% sodium chloride infusion 250 mL  250 mL IntraVENous PRN  
 dilTIAZem IR (CARDIZEM) tablet 60 mg  60 mg Oral AC&HS  predniSONE (DELTASONE) tablet 20 mg  20 mg Oral DAILY WITH BREAKFAST  nystatin (MYCOSTATIN) 100,000 unit/mL oral suspension 500,000 Units  500,000 Units Oral QID  potassium chloride SR (KLOR-CON 10) tablet 20 mEq  20 mEq Oral BID  
 amiodarone (CORDARONE) tablet 200 mg  200 mg Oral DAILY  furosemide (LASIX) injection 40 mg  40 mg IntraVENous DAILY  glucose chewable tablet 16 g  4 Tab Oral PRN  
 dextrose (D50W) injection syrg 12.5-25 g  25-50 mL IntraVENous PRN  
 glucagon (GLUCAGEN) injection 1 mg  1 mg IntraMUSCular PRN  
 insulin lispro (HUMALOG) injection   SubCUTAneous AC&HS  cefTRIAXone (ROCEPHIN) 1 g in sterile water (preservative free) 10 mL IV syringe  1 g IntraVENous Q24H  
 0.9% sodium chloride infusion 250 mL  250 mL IntraVENous PRN  
 albuterol-ipratropium (DUO-NEB) 2.5 MG-0.5 MG/3 ML  3 mL Nebulization Q6H RT  
 arformoteroL (BROVANA) neb solution 15 mcg  15 mcg Nebulization BID RT  
 ondansetron (ZOFRAN) injection 4 mg  4 mg IntraVENous Q4H PRN  
 lipase-protease-amylase (CREON 24,000) capsule 2 Cap  2 Cap Oral TID WITH MEALS  
 albuterol (PROVENTIL HFA, VENTOLIN HFA, PROAIR HFA) inhaler 2 Puff  2 Puff Inhalation Q4H PRN  
 apixaban (ELIQUIS) tablet 5 mg  5 mg Oral BID  cholecalciferol (VITAMIN D3) (1000 Units /25 mcg) tablet 2 Tab  2,000 Units Oral DAILY  cyanocobalamin (VITAMIN B12) tablet 1,000 mcg  1,000 mcg Oral DAILY  DULoxetine (CYMBALTA) capsule 60 mg  60 mg Oral DAILY  ferrous sulfate tablet 325 mg  325 mg Oral DAILY WITH BREAKFAST  mirtazapine (REMERON) tablet 15 mg  15 mg Oral QHS  montelukast (SINGULAIR) tablet 10 mg  10 mg Oral QPM  
 pantoprazole (PROTONIX) tablet 40 mg  40 mg Oral ACB  sodium chloride (NS) flush 5-40 mL  5-40 mL IntraVENous Q8H  
 sodium chloride (NS) flush 5-40 mL  5-40 mL IntraVENous PRN  
 acetaminophen (TYLENOL) tablet 650 mg  650 mg Oral Q6H PRN Or  
 acetaminophen (TYLENOL) suppository 650 mg  650 mg Rectal Q6H PRN  polyethylene glycol (MIRALAX) packet 17 g  17 g Oral DAILY PRN  
 guaiFENesin-dextromethorphan (ROBITUSSIN DM) 100-10 mg/5 mL syrup 5 mL  5 mL Oral Q4H PRN Castillo Roberson, NP

## 2021-02-02 NOTE — PROGRESS NOTES
Daily Progress Note: 2/2/2021 Alistair Brown MD 
 
Assessment/Plan: 1. Pneumonia  
     - continue IV antibiotics Azithromycin 500 mg IV q 24 hours and Ceftriaxone 1 gram IV q 24 hours 2. Shortness of breath (SOB) - with history of chronic hypoxic respiratory failure on 3 liters chronically - provide supplemental oxygen and continuous pulse oximetry monitoring 
  
3. Acute on chronic diastolic CHF (HFpEF) - diuresis per Cardiology 
     - monitor fluid status closely 
     - ECHO results as under Data Review - little changed cardiac function 
     - cardiology consulted 
  
4. Negative for COVID 19 virus infection 
     - rapid COVID 19 test is negative  
     - order Vitamin C, D, and Zinc 
     - Dexamethasone 6 mg q 24 hours - weaned off 
  
5.  Leukocytosis 
     - monitor, antibiotics as above 
  
6. Anemia 
     - transfuse for hemoglobin < 7.0. 
     - repeat H/H 
     - recheck iron profile, B12, folate levels 
  
7. Uncontrolled type 2 DM with hyperglycemia - Humalog insulin correctional coverage, scheduled blood glucose checks and hemoglobin A1c level. 
  
8. Acute superimposed on chronic kidney disease stage 3 
     - monitor and tx as needed 9. Elevated alkaline phosphatase 
     - monitor and treat as needed 
  
10. Hypertension 
       - hold on home Lisinopril - HCTZ with elevated creatinine and soft BPs - restart as able. - On Amlodipine per cards 
  
11. History of GI bleed - s/p upper GI endoscopy and colonoscopy on 1/4/2021 with Adenomatous polyp of transverse colon [D12.3] Candida esophagitis ([facility] [address] [B37.81] Gastritis and duodenitis [  
  - GI consulted 12. Obesity 
       - recommended weight loss, heart healthy diet, and lifestyle modification 13. GERD 
       - Protonix 
  
14. Paroxysmal atrial fibrillation (afib) 
       - on long term anticoagulation on Eliquis - tx per Cardiology 
       - IR Cardizem VTE prophylaxis:  Plan as above 
  
CODE STATUS:  FULL CODE as discussed with patient who requests the same. Problem List: 
Problem List as of 2/2/2021 Date Reviewed: 11/2/2020 Codes Class Noted - Resolved Pneumonia ICD-10-CM: J18.9 ICD-9-CM: 070  1/26/2021 - Present SOB (shortness of breath) ICD-10-CM: R06.02 
ICD-9-CM: 786.05  1/26/2021 - Present Anemia ICD-10-CM: D64.9 ICD-9-CM: 285.9  1/26/2021 - Present Acute on chronic systolic CHF (congestive heart failure) (HCC) ICD-10-CM: K62.47 ICD-9-CM: 428.23, 428.0  1/26/2021 - Present Uncontrolled type 2 diabetes mellitus with hyperglycemia (HCC) ICD-10-CM: E11.65 ICD-9-CM: 250.02  1/26/2021 - Present Left lower lobe pneumonia ICD-10-CM: J18.9 ICD-9-CM: 629  1/1/2021 - Present Leukocytosis ICD-10-CM: F26.816 ICD-9-CM: 288.60  11/3/2020 - Present Chronic respiratory failure with hypoxia [facility]) ICD-10-CM: J96.11 
ICD-9-CM: 518.83, 799.02  11/2/2020 - Present Overview Signed 11/2/2020 10:12 PM by Bin Aldridge MD  
  On 3L NC at home Cellulitis of lower extremity ICD-10-CM: L03.119 ICD-9-CM: 682.6  3/23/2020 - Present ASO (arteriosclerosis obliterans) ICD-10-CM: I70.90 ICD-9-CM: 440.9  9/5/2019 - Present PVD (peripheral vascular disease) ([facility] [address] ICD-10-CM: I73.9 ICD-9-CM: 443.9  8/1/2019 - Present Severe obesity ([facility] [address] ICD-10-CM: E66.01 
ICD-9-CM: 278.01  7/30/2019 - Present COPD (chronic obstructive pulmonary disease) (HCC) ICD-10-CM: J44.9 ICD-9-CM: 605  7/13/2019 - Present Normocytic anemia ICD-10-CM: D64.9 ICD-9-CM: 285.9  7/13/2019 - Present PAD (peripheral artery disease) (HCC) ICD-10-CM: I73.9 ICD-9-CM: 443.9  7/13/2019 - Present Mild intermittent asthma ICD-10-CM: J45.20 ICD-9-CM: 493.90  5/17/2019 - Present Brachial artery thrombus (HCC) ICD-10-CM: K32.9 ICD-9-CM: 444.21  4/26/2019 - Present Sleep apnea ICD-10-CM: G47.30 ICD-9-CM: 780.57  1/5/2019 - Present Overview Signed 1/5/2019  8:41 PM by Roberto Salazar, DO  
  wears CPAP Atrial fibrillation [facility]) ICD-10-CM: I48.91 
ICD-9-CM: 427.31  11/16/2018 - Present Obesity (BMI 30-39.9) (Chronic) ICD-10-CM: U22.8 ICD-9-CM: 278.00  11/13/2018 - Present Recurrent deep vein thrombosis (DVT) (HCC) ICD-10-CM: I82.409 ICD-9-CM: 453.40  3/30/2018 - Present Chronic anticoagulation (Chronic) ICD-10-CM: Z79.01 
ICD-9-CM: V58.61  3/30/2018 - Present Essential hypertension (Chronic) ICD-10-CM: I10 
ICD-9-CM: 401.9  1/22/2016 - Present CAD (coronary artery disease) ICD-10-CM: I25.10 ICD-9-CM: 414.00  10/9/2015 - Present Type II diabetes mellitus (HCC) (Chronic) ICD-10-CM: E11.9 ICD-9-CM: 250.00  10/9/2015 - Present RESOLVED: Syncope and collapse ICD-10-CM: R55 
ICD-9-CM: 780.2  9/29/2020 - 11/2/2020 RESOLVED: Cellulitis ICD-10-CM: L03.90 ICD-9-CM: 682.9  3/23/2020 - 5/29/2020 RESOLVED: Hypokalemia ICD-10-CM: E87.6 ICD-9-CM: 276.8  3/23/2020 - 5/29/2020 RESOLVED: Hyponatremia ICD-10-CM: E87.1 ICD-9-CM: 276.1  3/23/2020 - 5/29/2020 RESOLVED: Diarrhea ICD-10-CM: R19.7 ICD-9-CM: 787.91  3/23/2020 - 5/29/2020 RESOLVED: Syncope and collapse ICD-10-CM: R55 
ICD-9-CM: 780.2  7/13/2019 - 5/29/2020 RESOLVED: UTI (urinary tract infection) ICD-10-CM: N39.0 ICD-9-CM: 599.0  7/13/2019 - 11/2/2020 RESOLVED: Sepsis ([address] ICD-10-CM: A41.9 ICD-9-CM: 038.9, 995.91  7/13/2019 - 11/3/2020 RESOLVED: Leg wound, left ICD-10-CM: S51.971K ICD-9-CM: 891.0  7/13/2019 - 5/29/2020 RESOLVED: Leg laceration, right, initial encounter ICD-10-CM: K28.434X ICD-9-CM: 894.0  7/13/2019 - 5/29/2020 RESOLVED: Cellulitis of right upper arm ICD-10-CM: L03.113 ICD-9-CM: 682.3  5/17/2019 - 5/29/2020 RESOLVED: Gangrene of finger of right hand ([facility] ICD-10-CM: A99 
ICD-9-CM: 785.4  5/17/2019 - 11/2/2020 RESOLVED: Bowel obstruction ([facility] ICD-10-CM: R81.604 ICD-9-CM: 560.9  1/8/2019 - 5/29/2020 RESOLVED: Partial small bowel obstruction ([facility] ICD-10-CM: K56.600 ICD-9-CM: 560.9  1/5/2019 - 5/29/2020 RESOLVED: Dyspnea ICD-10-CM: R06.00 
ICD-9-CM: 786.09  11/13/2018 - 5/29/2020 RESOLVED: ARF (acute renal failure) ([facility]) ICD-10-CM: N17.9 ICD-9-CM: 584.9  11/13/2018 - 5/29/2020 RESOLVED: Closed wedge compression fracture of T7 vertebra ([facility] ICD-10-CM: L44.258A ICD-9-CM: 805.2  11/13/2018 - 5/29/2020 RESOLVED: Type 2 diabetes with nephropathy ([facility] ICD-10-CM: E11.21 
ICD-9-CM: 250.40, 583.81  10/1/2018 - 11/2/2020 RESOLVED: Chest pain ICD-10-CM: R07.9 ICD-9-CM: 786.50  6/27/2018 - 5/29/2020 RESOLVED: Abdominal pain ICD-10-CM: R10.9 ICD-9-CM: 789.00  6/27/2018 - 5/29/2020 RESOLVED: Coronary artery disease involving native coronary artery without angina pectoris (Chronic) ICD-10-CM: I25.10 ICD-9-CM: 414.01  1/22/2016 - 11/2/2020 RESOLVED: HTN (hypertension) ICD-10-CM: I10 
ICD-9-CM: 401.9  10/9/2015 - 1/22/2016 RESOLVED: NSTEMI (non-ST elevated myocardial infarction) ([facility] ICD-10-CM: I21.4 ICD-9-CM: 410.70  10/9/2015 - 5/29/2020 Subjective: 70 y.o. female with past medical history of chronic hypoxic respiratory failure (on 3 liters home oxygen), asthma, atrial fibrillation (Afib), autoimmune disease, fibromyalgia, CAD, heart failure (HFpEF), T7 vertebral compression fracture, COPD, type 2 DM, DVT, GERD, hypertension, NSTEMI, PAD, sleep apnea, obesity presented to the ED from home with chief complaints of shortness of breath (SOB) and low hemoglobin. Patient has chronic SOB but worsened. She was last hospitalized here from 1/1/2021 - 1/15/2021 with left lower lobe pneumonia, acute on chronic hypoxic respiratory failure, sepsis, GI bleed, anemia. Patient was transfused with 2 units PRBCs with hemoglobin = 4.9 on 1/1/2021. Last Hgb= 6.5 on 1/21/2021. On arrival in the ED today, initial recorded vital signs were BP = 139/47, HR= 82, RR= 22, O2sat= 94% on 4 liters O2 nasal cannula (NC). WBC = 18,400. proBNP = 1,443. Chest xray portable showed cardiomegaly, interstitial edema, and density in left lung base. Patient is now seen for admission to the hospitalist service for continued evaluation and treatments. There were no reports of new onset syncope, loss of consciousness, headache, neck pain, visual disturbance, numbness, paresthesias, focal weakness, chest pain, palpitations, abdominal pain, nausea, vomiting, diarrhea, melena, dysuria, hematuria, calf pain, increased leg swelling/ edema, fever, chills, rash, or known COVID 19 exposure. (Dr Olive Ralph) 1/27:  Feeling better with less shortness of breath. Hb low so needs another transfusion. No clear source of bleeding. Covid negative. GI to see. 1/28:  Feels some better except sore throat - looks like thrush. Less shortness of breath. Hb up to 7.9 this AM after 1 unit PRC. K+ sl low - replacing. :  Little change from yesterday. BP continues to be on the low side and pt reports feeling lightheaded with sitting up - Cards adjusting meds. Hb down to 7.3 - watching - will likely need another transfusion. WBC 19K - likely due to steroids - weaning. :  Reports feeling better and no longer lightheaded. Cards has adjusted meds  Afeb. Will do 1-2 more days of IV antibiotics. :  No new complaints this AM.  Cards has signed off. BP remains better. Hb at 7.0 - given her hx will transfuse today. Risks v benefits discussed. :  No new complaints. She reports she feels a little stronger. AM labs pending. :  Currently no complaints. Desats with even smallest amt of exercise and O2 placed back on 5 liters NC. Pul continues to see. Cards seeing again. Hb 8.1 yesterday - AM Hb pending. Review of Systems: A comprehensive review of systems was negative except for that written in the HPI. Objective:  
Physical Exam:  
Visit Vitals BP (!) 142/52 (BP 1 Location: Right upper arm, BP Patient Position: At rest) Pulse 79 Temp 97.9 °F (36.6 °C) Resp 16 Ht 5' 7\" (1.702 m) Wt 113.1 kg (249 lb 6.4 oz) SpO2 99% BMI 39.06 kg/m² O2 Flow Rate (L/min): 5 l/min O2 Device: Nasal cannula Temp (24hrs), Av.9 °F (36.6 °C), Min:97.7 °F (36.5 °C), Max:98.2 °F (36.8 °C) No intake/output data recorded. 701 -  1900 In: 12 [P.O.:480; I.V.:10] Out: - General:  Alert, obese, cooperative, no distress, appears stated age. Head:  Normocephalic, without obvious abnormality, atraumatic. Eyes:  Conjunctivae/corneas clear. EOMs intact. Throat: Lips, mucosa, and tongue moist; throat red with a few white patches on tongue. Neck: Supple, symmetrical, trachea midline, no adenopathy, thyroid: no enlargement/tenderness/nodules, no carotid bruit and no JVD. Lungs:   Clear to auscultation bilaterally. Chest wall:  No tenderness or deformity. Heart:  irreg with rate in 50S, no murmur, click, rub or gallop. Abdomen:   Soft, non-tender. Bowel sounds normal. No masses,  No organomegaly. Extremities: no cyanosis. 1+ LE edema on top of chronic stasis changes. No calf tenderness or cords. Pulses: 2+ and symmetric all extremities. Skin:  turgor normal. No rashes Neurologic:   Alert and oriented X 3.  equal.  No cogwheeling or rigidity. Gait not tested at this time. Sensation grossly normal to touch. Gross motor of extremities normal except generalized weakness. Data Review:  
ECHO 1/29/21 Interpretation Summary Result status: Final result · Pericardium: There is a moderate left pleural effusion. · LV: Estimated LVEF is 60 - 65%. Normal cavity size, wall thickness and systolic function (ejection fraction normal). · TV: Mild tricuspid valve regurgitation is present. Recent Days: 
Recent Labs 02/01/21 
7324 01/31/21 
4922 WBC 16.1* 15.4* HGB 8.1* 7.0*  
HCT 27.9* 24.9*  
* 388 Recent Labs 02/01/21 
0385 01/31/21 
7800  140  
K 4.0 4.2 CL 99 102 CO2 39* 37* * 181* BUN 26* 28* CREA 1.22* 1.27* CA 7.9* 7.9* ALB 2.2* 2.2* TBILI 0.4 0.3 ALT 22 25 No results for input(s): PH, PCO2, PO2, HCO3, FIO2 in the last 72 hours. 24 Hour Results: 
Recent Results (from the past 24 hour(s)) CBC WITH AUTOMATED DIFF Collection Time: 02/01/21  7:07 AM  
Result Value Ref Range WBC 16.1 (H) 3.6 - 11.0 K/uL  
 RBC 2.87 (L) 3.80 - 5.20 M/uL HGB 8.1 (L) 11.5 - 16.0 g/dL HCT 27.9 (L) 35.0 - 47.0 % MCV 97.2 80.0 - 99.0 FL  
 MCH 28.2 26.0 - 34.0 PG  
 MCHC 29.0 (L) 30.0 - 36.5 g/dL  
 RDW 18.1 (H) 11.5 - 14.5 % PLATELET 393 (H) 168 - 400 K/uL MPV 10.4 8.9 - 12.9 FL  
 NRBC 0.2 (H) 0  WBC ABSOLUTE NRBC 0.03 (H) 0.00 - 0.01 K/uL NEUTROPHILS 80 (H) 32 - 75 % LYMPHOCYTES 9 (L) 12 - 49 % MONOCYTES 7 5 - 13 % EOSINOPHILS 2 0 - 7 % BASOPHILS 0 0 - 1 % IMMATURE GRANULOCYTES 2 (H) 0.0 - 0.5 % ABS. NEUTROPHILS 13.0 (H) 1.8 - 8.0 K/UL  
 ABS. LYMPHOCYTES 1.4 0.8 - 3.5 K/UL  
 ABS. MONOCYTES 1.2 (H) 0.0 - 1.0 K/UL  
 ABS. EOSINOPHILS 0.3 0.0 - 0.4 K/UL  
 ABS. BASOPHILS 0.1 0.0 - 0.1 K/UL  
 ABS. IMM. GRANS. 0.2 (H) 0.00 - 0.04 K/UL  
 DF AUTOMATED METABOLIC PANEL, COMPREHENSIVE Collection Time: 02/01/21  7:07 AM  
Result Value Ref Range Sodium 140 136 - 145 mmol/L Potassium 4.0 3.5 - 5.1 mmol/L Chloride 99 97 - 108 mmol/L  
 CO2 39 (H) 21 - 32 mmol/L Anion gap 2 (L) 5 - 15 mmol/L Glucose 125 (H) 65 - 100 mg/dL BUN 26 (H) 6 - 20 MG/DL Creatinine 1.22 (H) 0.55 - 1.02 MG/DL  
 BUN/Creatinine ratio 21 (H) 12 - 20 GFR est AA 52 (L) >60 ml/min/1.73m2 GFR est non-AA 43 (L) >60 ml/min/1.73m2 Calcium 7.9 (L) 8.5 - 10.1 MG/DL Bilirubin, total 0.4 0.2 - 1.0 MG/DL  
 ALT (SGPT) 22 12 - 78 U/L  
 AST (SGOT) 15 15 - 37 U/L Alk. phosphatase 238 (H) 45 - 117 U/L Protein, total 5.6 (L) 6.4 - 8.2 g/dL Albumin 2.2 (L) 3.5 - 5.0 g/dL Globulin 3.4 2.0 - 4.0 g/dL A-G Ratio 0.6 (L) 1.1 - 2.2 SAMPLES BEING HELD Collection Time: 02/01/21  7:07 AM  
Result Value Ref Range SAMPLES BEING HELD 1SST COMMENT Add-on orders for these samples will be processed based on acceptable specimen integrity and analyte stability, which may vary by analyte. GLUCOSE, POC Collection Time: 02/01/21 11:06 AM  
Result Value Ref Range Glucose (POC) 174 (H) 65 - 100 mg/dL Performed by Bryant Flowers (PCT) GLUCOSE, POC Collection Time: 02/01/21  3:51 PM  
Result Value Ref Range Glucose (POC) 287 (H) 65 - 100 mg/dL Performed by Bryant Flowers (PCT) GLUCOSE, POC Collection Time: 02/01/21  9:20 PM  
Result Value Ref Range Glucose (POC) 244 (H) 65 - 100 mg/dL Performed by Eliza Almaraz (PCT) Medications reviewed Current Facility-Administered Medications Medication Dose Route Frequency  albuterol-ipratropium (DUO-NEB) 2.5 MG-0.5 MG/3 ML  3 mL Nebulization Q6HWA RT  
 albuterol-ipratropium (DUO-NEB) 2.5 MG-0.5 MG/3 ML  3 mL Nebulization Q4H PRN  
 dilTIAZem ER (CARDIZEM CD) capsule 240 mg  240 mg Oral DAILY  0.9% sodium chloride infusion 250 mL  250 mL IntraVENous PRN  predniSONE (DELTASONE) tablet 20 mg  20 mg Oral DAILY WITH BREAKFAST  nystatin (MYCOSTATIN) 100,000 unit/mL oral suspension 500,000 Units  500,000 Units Oral QID  potassium chloride SR (KLOR-CON 10) tablet 20 mEq  20 mEq Oral BID  
 amiodarone (CORDARONE) tablet 200 mg  200 mg Oral DAILY  furosemide (LASIX) injection 40 mg  40 mg IntraVENous DAILY  glucose chewable tablet 16 g  4 Tab Oral PRN  
 dextrose (D50W) injection syrg 12.5-25 g  25-50 mL IntraVENous PRN  
 glucagon (GLUCAGEN) injection 1 mg  1 mg IntraMUSCular PRN  
 insulin lispro (HUMALOG) injection   SubCUTAneous AC&HS  
 0.9% sodium chloride infusion 250 mL  250 mL IntraVENous PRN  
 arformoteroL (BROVANA) neb solution 15 mcg  15 mcg Nebulization BID RT  
 ondansetron (ZOFRAN) injection 4 mg  4 mg IntraVENous Q4H PRN  
 lipase-protease-amylase (CREON 24,000) capsule 2 Cap  2 Cap Oral TID WITH MEALS  
 albuterol (PROVENTIL HFA, VENTOLIN HFA, PROAIR HFA) inhaler 2 Puff  2 Puff Inhalation Q4H PRN  
 apixaban (ELIQUIS) tablet 5 mg  5 mg Oral BID  cholecalciferol (VITAMIN D3) (1000 Units /25 mcg) tablet 2 Tab  2,000 Units Oral DAILY  cyanocobalamin (VITAMIN B12) tablet 1,000 mcg  1,000 mcg Oral DAILY  DULoxetine (CYMBALTA) capsule 60 mg  60 mg Oral DAILY  ferrous sulfate tablet 325 mg  325 mg Oral DAILY WITH BREAKFAST  mirtazapine (REMERON) tablet 15 mg  15 mg Oral QHS  montelukast (SINGULAIR) tablet 10 mg  10 mg Oral QPM  
 pantoprazole (PROTONIX) tablet 40 mg  40 mg Oral ACB  sodium chloride (NS) flush 5-40 mL  5-40 mL IntraVENous Q8H  
  sodium chloride (NS) flush 5-40 mL  5-40 mL IntraVENous PRN  
 acetaminophen (TYLENOL) tablet 650 mg  650 mg Oral Q6H PRN Or  
 acetaminophen (TYLENOL) suppository 650 mg  650 mg Rectal Q6H PRN  polyethylene glycol (MIRALAX) packet 17 g  17 g Oral DAILY PRN  
 guaiFENesin-dextromethorphan (ROBITUSSIN DM) 100-10 mg/5 mL syrup 5 mL  5 mL Oral Q4H PRN Care Plan discussed with: Patient/GI/Cardiology/Pul and Nurse Total time spent with patient: 30 minutes.  
Juan Mason MD

## 2021-02-02 NOTE — PROGRESS NOTES
Bedside shift change report given to [address], RN (oncoming nurse) by Bao Silva RN (offgoing nurse). Report included the following information SBAR, Kardex, Procedure Summary, Intake/Output, MAR, Recent Results and Med Rec Status.

## 2021-02-02 NOTE — PROGRESS NOTES
Bedside shift change report given to Kristin (oncoming nurse) by Efrain Florentino (offgoing nurse). Report included the following information SBAR, Kardex, MAR and Recent Results.

## 2021-02-02 NOTE — PROGRESS NOTES
Cardiology Progress Note     [facility]   
NAME:  Jos Edward :   1945 MRN:   [id number] Assessment/Plan: 1. PAF: back in AF thi AM, but the rate is controlled - continue amiodarone, diltiazem, eliquis 2. Chronic resp failure: on 3 liters oxygen baseline. 3. HF p EF: requiring more O2 last night (5L) wears 3L baseline at home. Sleep apnea? Will continue IV diuretics today and transition to PO probably tomorrow. Repeat chest Xray this AM 
4. Anemia 5. Probable PNA Reviewed above plan with the patient Yola Chicamisha [facility] Subjective:  
Jos Edward is a 70 y.o. female with past medical history of COPD on 3 liters nasal canula oxygen OP,  paroxysmal atrial fibrillation,  autoimmune disease, fibromyalgia, CAD, HFpEF, obesity presented to the ED from home with chief complaints of shortness of breath (SOB) and low hemoglobin. Patient has chronic SOB but worsened. She was last hospitalized here from 2021 - 1/15/2021 with left lower lobe pneumonia, acute on chronic hypoxic respiratory failure, sepsis, GI bleed, anemia. proBNP = 1,443. Chest xray portable showed cardiomegaly, interstitial edema, and density in left lung base Cardiology reconsulted for BP 0815: Patients BP low at 103/41 with a MAP of 55 and HR of 73. Dr. Bonny Balderrama notified and orders to put in consult to cardiology. Cardiac ROS: Patient denies any  chest pain, breathing comfortably. Got SOB last night and O2 turned up to 5 L. A little EDWARDS this AM. Needs neg covid test before she go to [facility] Previous Cardiac Eval 
20 ECHO ADULT COMPLETE 2020 Narrative · LV: Estimated LVEF is 60 - 65%. Visually measured ejection fraction. Normal cavity size and systolic function (ejection fraction normal). Mild  
concentric hypertrophy. Wall motion: normal. 
· AV: With no evidence of reduced excursion. Signed by: Carrie Toussaint MD  
 
XR Results (most recent): 
[facility]R CHEST PORT Narrative INDICATION:    Heart Failure EXAMINATION:  AP CHEST, PORTABLE 
 
COMPARISON: 2021 FINDINGS: Single AP portable view of the chest at 758 hours demonstrates 
evidence of a left pleural effusion with left lower lobe atelectasis. The 
cardiomediastinal silhouette is stable. There is no new airspace disease. There 
is chronic perihilar edema. Impression No significant change. Mild pulmonary edema and left lower lobe atelectasis with 
left pleural effusion. Objective:  
 
Visit Vitals BP (!) 135/58 (BP 1 Location: Right upper arm, BP Patient Position: At rest) Pulse 81 Temp 97.8 °F (36.6 °C) Resp 18 Ht 5' 7\" (1.702 m) Wt 249 lb 6.4 oz (113.1 kg) SpO2 99% BMI 39.06 kg/m² O2 Flow Rate (L/min): 5 l/min O2 Device: Nasal cannula Temp (24hrs), Av.9 °F (36.6 °C), Min:97.7 °F (36.5 °C), Max:98.2 °F (36.8 °C) No intake/output data recorded.  1901 -  0700 In: 56 [P.O.:480; I.V.:10] Out: -  
TELE: NSR> Af @0800 VR 80-90s General: A& O x3, obese, elderly, NAD HEENT: Atraumatic. Pink and moist.  Anicteric sclerae. Neck : Supple Lungs:  Distant BS - exam limitied d/t body habitus Heart: irregular rhythm 2/6 systolic Abdomen: obese, Soft, non-distended, non-tender. + Bowel sounds. Extremities: generalized edema. +1-2 yana LE, reddened/pink skin Neurologic: Grossly intact. No acute neurological distress. Psych: limited insight. Not anxious or agitated. Care Plan discussed with: 
  Comments Patient x Family RN Care Manager Consultant:     
 
 
Data Review: No lab exists for component: ITNL No results for input(s): CPK, CKMB, TROIQ in the last 72 hours. Recent Labs 21 
5928 21 
8774 21 
6742  140 140  
K 4.0 4.0 4.2 CL 98 99 102 CO2 38* 39* 37* BUN 23* 26* 28* CREA 1.19* 1.22* 1.27* * 125* 181* ALB 2.2* 2.2* 2.2* WBC  --  16.1* 15.4* HGB  --  8.1* 7.0* HCT  --  27.9* 24.9*  
PLT  --  410* 388 No results for input(s): INR, PTP, APTT, INREXT, INREXT in the last 72 hours. Medications reviewed Current Facility-Administered Medications Medication Dose Route Frequency  albuterol-ipratropium (DUO-NEB) 2.5 MG-0.5 MG/3 ML  3 mL Nebulization Q6HWA RT  
 albuterol-ipratropium (DUO-NEB) 2.5 MG-0.5 MG/3 ML  3 mL Nebulization Q4H PRN  
 furosemide (LASIX) injection 40 mg  40 mg IntraVENous BID  dilTIAZem ER (CARDIZEM CD) capsule 240 mg  240 mg Oral DAILY  0.9% sodium chloride infusion 250 mL  250 mL IntraVENous PRN  predniSONE (DELTASONE) tablet 20 mg  20 mg Oral DAILY WITH BREAKFAST  nystatin (MYCOSTATIN) 100,000 unit/mL oral suspension 500,000 Units  500,000 Units Oral QID  potassium chloride SR (KLOR-CON 10) tablet 20 mEq  20 mEq Oral BID  
 amiodarone (CORDARONE) tablet 200 mg  200 mg Oral DAILY  glucose chewable tablet 16 g  4 Tab Oral PRN  
 dextrose (D50W) injection syrg 12.5-25 g  25-50 mL IntraVENous PRN  
 glucagon (GLUCAGEN) injection 1 mg  1 mg IntraMUSCular PRN  
 insulin lispro (HUMALOG) injection   SubCUTAneous AC&HS  
 0.9% sodium chloride infusion 250 mL  250 mL IntraVENous PRN  
 arformoteroL (BROVANA) neb solution 15 mcg  15 mcg Nebulization BID RT  
 ondansetron (ZOFRAN) injection 4 mg  4 mg IntraVENous Q4H PRN  
 lipase-protease-amylase (CREON 24,000) capsule 2 Cap  2 Cap Oral TID WITH MEALS  
 albuterol (PROVENTIL HFA, VENTOLIN HFA, PROAIR HFA) inhaler 2 Puff  2 Puff Inhalation Q4H PRN  
 apixaban (ELIQUIS) tablet 5 mg  5 mg Oral BID  cholecalciferol (VITAMIN D3) (1000 Units /25 mcg) tablet 2 Tab  2,000 Units Oral DAILY  cyanocobalamin (VITAMIN B12) tablet 1,000 mcg  1,000 mcg Oral DAILY  DULoxetine (CYMBALTA) capsule 60 mg  60 mg Oral DAILY  ferrous sulfate tablet 325 mg  325 mg Oral DAILY WITH BREAKFAST  mirtazapine (REMERON) tablet 15 mg  15 mg Oral QHS  montelukast (SINGULAIR) tablet 10 mg  10 mg Oral QPM  
 pantoprazole (PROTONIX) tablet 40 mg  40 mg Oral ACB  sodium chloride (NS) flush 5-40 mL  5-40 mL IntraVENous Q8H  
 sodium chloride (NS) flush 5-40 mL  5-40 mL IntraVENous PRN  
 acetaminophen (TYLENOL) tablet 650 mg  650 mg Oral Q6H PRN Or  
 acetaminophen (TYLENOL) suppository 650 mg  650 mg Rectal Q6H PRN  polyethylene glycol (MIRALAX) packet 17 g  17 g Oral DAILY PRN  
 guaiFENesin-dextromethorphan (ROBITUSSIN DM) 100-10 mg/5 mL syrup 5 mL  5 mL Oral Q4H PRN Shabbir Stinson NP

## 2021-02-02 NOTE — PROGRESS NOTES
0142 
 
Pt SOB and desatting to 89% while turning in bed to be cleaned. O2 increased to 5L NC. After a few minutes of rest, pt more comfortable and would like to stay on 5LO2 for a little while longer before being weaned down. Primary RN notified.

## 2021-02-02 NOTE — PROGRESS NOTES
2/2/2021   CARE MANAGEMENT NOTE:  CM reviewed EMR for clinical updates and handoff received from ER  (Korin Draper.). Pt is a READMISSION. She was admitted to [facility] from 1/1/21 - 1/15/21 with PNA, and acute respiratory failure. She discharged to [facility]. On 1/26/21, pt was readmitted with PNA, CHF, anemia, and HTN. Reportedly, pt lived alone prior to admit to [address] [place] [address] for for SNF level of rehab. Dtr Gilmar Senthil ([phone]) is her primary family contact. 
  
RUR 51%; LOS 6 days  
  
Transition Plan of Care: 1. Plan is for pt to return to [facility] for continued rehab 2. Long term goal is to relocate into an assisted living facility 3. COVID 19 test (two tests within 24 hrs) for readmit to [address] [place] [address] will be required closer to discharge 4. Cardiac bundle pt 5. Dtr to transport pt with portable oxygen tank vs [facility] at discharge 
  
CM will continue to follow pt until discharged. Jeanette

## 2021-02-02 NOTE — PROGRESS NOTES
Comprehensive Nutrition Assessment Type and Reason for Visit: Monty Martinez Nutrition Recommendations/Plan: 1. Continue with Consistent CHO diet order. 2. Continue chocolate Glucerna BID to promote adequate po intake. Nutrition Assessment:     
2/2: F/u. Pt reports her appetite has improved. Reports eating bagel, cash, eggs, and 100% Glucerna. Says she wasn't as hungry for lunch but ate some of her pasta, salad, and cake. Recorded intakes 50-75% meals yesterday. Likes Glucerna and is drinking all of them. Dislikes Gelatein and Ensure Pudding- will d/c. Will continue Glucerna. No c/o N/V. Labs- Hgb 8.1, Mg 2.5, Cr 1.19, -[phone]. Meds- vit. D3, Vit B12, FeSu, lasix, insulin, Creon, KCl, Remeron, Protonix, prednisone. 1/29: 69 yo female admitted for SOB. PMHx: CAD, CHF, COPD, DM, GERD, HTN, PAD, chronic hypoxic respiratory failure on 3L home O2. Class II obese per BMI of 38. Weight hx in EMR indicates weight loss of 3.8kg over last 3 months (3% loss which is not significant for time frame). Spoke with pt at bedside, confirms having had weight loss since January 1st secondary to eating less. States her appetite has been good at [employer] but the portions are smaller than she is used to eating. C/o poor PO intakes since admission secondary to food being cold, not liking it. States she has not eaten anything since admission. Discussed ONS options, pt states she has had Boost in past and likes it, willing to try Glucerna, Ensure Pudding, and [id number]. Labs: , POC [id number]. Meds: Vit B 12 and D3, Remeron, lasix, Kcl, Humalog, Prednisone, Creon, FeSu. Malnutrition Assessment: 
Does not meet criteria at this time. Estimated Daily Nutrient Needs: 
Energy (kcal): 1986(REE 1655 x AF 1.2); Weight Used for Energy Requirements: Current Protein (g): 89(0.8-1gm/kg/d); Weight Used for Protein Requirements: Current Fluid (ml/day): 1986; Method Used for Fluid Requirements: 1 ml/kcal 
 
 
Nutrition Related Findings:  LBM 2/1: 3+ pitting LE, 2+ pitting UE edema Wounds:   
Multiple Current Nutrition Therapies: DIET DIABETIC CONSISTENT CARB Regular DIET NUTRITIONAL SUPPLEMENTS Breakfast, Lunch; Glucerna Shake Anthropometric Measures: 
· Height:  5' 7\" (170.2 cm) · Current Body Wt:  113.1 kg (249 lb 5.4 oz) · Admission Body Wt:      
· Usual Body Wt:       
· Ideal Body Wt:  135 lbs:  182.6 % · Adjusted Body Weight:   ; Weight Adjustment for: No adjustment · Adjusted BMI:      
· BMI Category:  Obese class 2 (BMI 35.0-39. 9) Nutrition Diagnosis:  
· Inadequate protein-energy intake related to inadequate protein-energy intake as evidenced by intake 0-25% 2/2: Nutrition dx improving, >/=50% meals + ONS. Nutrition Interventions:  
Food and/or Nutrient Delivery: Continue current diet, Continue oral nutrition supplement Nutrition Education and Counseling: No recommendations at this time Coordination of Nutrition Care: Continue to monitor while inpatient Goals: 
PO intake >50% meals + ONS next 5-7 days Nutrition Monitoring and Evaluation:  
Behavioral-Environmental Outcomes:   
Food/Nutrient Intake Outcomes: Food and nutrient intake, Supplement intake Physical Signs/Symptoms Outcomes: Biochemical data, GI status, Weight Discharge Planning:   
Continue current diet Electronically signed by Dannielle Marks RD on 2/2/2021 Contact: [phone]

## 2021-02-03 NOTE — PROGRESS NOTES
Cardiology Progress Note     [facility]   
NAME:  Giuseppe Mehta :   1945 MRN:   [id number] Assessment/Plan: 1. PAF: remains in a fib, rate stable, 90's Continue amiodarone, diltiazem, eliquis. 2. Chronic resp failure: on 3 liters oxygen baseline. 3. HF p EF: xray with pleural effusions, mild pulm edema 2/2. Cont IV lasix today, I/O incomplete. Mackenzie pBNP. 4. Anemia: hgb 8 Pt personally seen and examined. Chart reviewed. Agree with advanced NP's history, exam and  A/P with changes/additons. Visit Vitals BP (!) 115/57 (BP 1 Location: Left lower arm, BP Patient Position: At rest) Pulse 92 Temp 98 °F (36.7 °C) Resp 18 Ht 5' 7\" (1.702 m) Wt 242 lb 3.2 oz (109.9 kg) SpO2 98% BMI 37.93 kg/m² Cont diuresis Reid Moura MD, [facility] Subjective:  
Giuseppe Mehta is a 70 y.o. female with past medical history of COPD on 3 liters nasal canula oxygen OP,  paroxysmal atrial fibrillation,  autoimmune disease, fibromyalgia, CAD, HFpEF, obesity presented to the ED from home with chief complaints of shortness of breath (SOB) and low hemoglobin. Patient has chronic SOB but worsened. She was last hospitalized here from 2021 - 1/15/2021 with left lower lobe pneumonia, acute on chronic hypoxic respiratory failure, sepsis, GI bleed, anemia. proBNP = 1,443. Chest xray portable showed cardiomegaly, interstitial edema, and density in left lung base Cardiology reconsulted for BP 0815: Patients BP low at 103/41 with a MAP of 55 and HR of 73. Dr. Aruna Oseguera notified and orders to put in consult to cardiology. Cardiac ROS: Patient denies any  chest pain, breathing comfortably. Got SOB last night and O2 turned up to 5 L. A little EDWARDS this AM. Needs neg covid test before she go to [place] Previous Cardiac Eval 
20 ECHO ADULT COMPLETE 2020 Narrative · LV: Estimated LVEF is 60 - 65%. Visually measured ejection fraction. Normal cavity size and systolic function (ejection fraction normal). Mild  
concentric hypertrophy. Wall motion: normal. 
· AV: With no evidence of reduced excursion. Signed by: Kevan Miramontes MD  
 
XR Results (most recent): 
Results from [facility] Encounter encounter on 21 XR CHEST PORT Narrative Portable chest single view dated 2021 Comparison chest dated 2021 History is increasing O2 needs A single portable AP upright view of the chest was obtained. The cardiac 
silhouette continues to be enlarged. There continues be opacification of the 
left lung base and a few air bronchograms are noted in the left hilar/parahilar 
region. This is suggestive of developing atelectasis/infiltration. There is some 
atelectatic change in the mid/upper portion of the left lung. Some hazy density 
is associated with the right lung. This is suggestive of developing 
infiltration. There is blunting of both costophrenic angles. This is compatible 
with the presence of bilateral pleural effusions. Impression 1. Continued evidence of mild pulmonary edema. 2. Presence of hazy density involving both lungs suggestive of developing 
infiltration/atelectatic change. 3. Presence of bilateral pleural effusions. Objective:  
 
Visit Vitals /61 (BP 1 Location: Right lower arm, BP Patient Position: At rest) Pulse 82 Temp 97.9 °F (36.6 °C) Resp 20 Ht 5' 7\" (1.702 m) Wt 109.9 kg (242 lb 3.2 oz) SpO2 98% BMI 37.93 kg/m² O2 Flow Rate (L/min): 4 l/min O2 Device: Nasal cannula Temp (24hrs), Av.9 °F (36.6 °C), Min:97.5 °F (36.4 °C), Max:98.4 °F (36.9 °C) No intake/output data recorded.  190 -  0700 In: 363 [P.O.:360; I.V.:3] 
Out: -  
TELE: NSR> Af @0800 VR 80-90s General: A&O  X 2, obese, elderly, NAD 
 HEENT: Atraumatic. Pink and moist.  Anicteric sclerae. Neck : Supple Lungs:  Diminished throughout Heart: irregular rhythm 2/6 systolic Abdomen: obese, Soft, non-distended, non-tender. + Bowel sounds. Extremities: generalized edema. 2 +LE's  
Neurologic: Grossly intact. No acute neurological distress. Psych: limited insight. Not anxious or agitated. Care Plan discussed with: 
  Comments Patient x Family RN x Care Manager Consultant:     
 
 
Data Review: No lab exists for component: ITNL No results for input(s): CPK, CKMB, TROIQ in the last 72 hours. Recent Labs 02/03/21 
6463 02/02/21 
6563 02/01/21 
4996  138 140  
K 3.9 4.0 4.0  
CL 97 98 99 CO2 37* 38* 39* BUN 23* 23* 26* CREA 1.18* 1.19* 1.22* * 110* 125* ALB 2.1* 2.2* 2.2* WBC 14.0*  --  16.1* HGB 8.0*  --  8.1* HCT 27.5*  --  27.9*  
  --  410* No results for input(s): INR, PTP, APTT, INREXT, INREXT in the last 72 hours. Medications reviewed Current Facility-Administered Medications Medication Dose Route Frequency  albuterol-ipratropium (DUO-NEB) 2.5 MG-0.5 MG/3 ML  3 mL Nebulization Q6HWA RT  
 albuterol-ipratropium (DUO-NEB) 2.5 MG-0.5 MG/3 ML  3 mL Nebulization Q4H PRN  
 furosemide (LASIX) injection 40 mg  40 mg IntraVENous BID  dilTIAZem ER (CARDIZEM CD) capsule 240 mg  240 mg Oral DAILY  0.9% sodium chloride infusion 250 mL  250 mL IntraVENous PRN  predniSONE (DELTASONE) tablet 20 mg  20 mg Oral DAILY WITH BREAKFAST  nystatin (MYCOSTATIN) 100,000 unit/mL oral suspension 500,000 Units  500,000 Units Oral QID  potassium chloride SR (KLOR-CON 10) tablet 20 mEq  20 mEq Oral BID  
 amiodarone (CORDARONE) tablet 200 mg  200 mg Oral DAILY  glucose chewable tablet 16 g  4 Tab Oral PRN  
 dextrose (D50W) injection syrg 12.5-25 g  25-50 mL IntraVENous PRN  
 glucagon (GLUCAGEN) injection 1 mg  1 mg IntraMUSCular PRN  
  insulin lispro (HUMALOG) injection   SubCUTAneous AC&HS  
 0.9% sodium chloride infusion 250 mL  250 mL IntraVENous PRN  
 arformoteroL (BROVANA) neb solution 15 mcg  15 mcg Nebulization BID RT  
 ondansetron (ZOFRAN) injection 4 mg  4 mg IntraVENous Q4H PRN  
 lipase-protease-amylase (CREON 24,000) capsule 2 Cap  2 Cap Oral TID WITH MEALS  
 albuterol (PROVENTIL HFA, VENTOLIN HFA, PROAIR HFA) inhaler 2 Puff  2 Puff Inhalation Q4H PRN  
 apixaban (ELIQUIS) tablet 5 mg  5 mg Oral BID  cholecalciferol (VITAMIN D3) (1000 Units /25 mcg) tablet 2 Tab  2,000 Units Oral DAILY  cyanocobalamin (VITAMIN B12) tablet 1,000 mcg  1,000 mcg Oral DAILY  DULoxetine (CYMBALTA) capsule 60 mg  60 mg Oral DAILY  ferrous sulfate tablet 325 mg  325 mg Oral DAILY WITH BREAKFAST  mirtazapine (REMERON) tablet 15 mg  15 mg Oral QHS  montelukast (SINGULAIR) tablet 10 mg  10 mg Oral QPM  
 pantoprazole (PROTONIX) tablet 40 mg  40 mg Oral ACB  sodium chloride (NS) flush 5-40 mL  5-40 mL IntraVENous Q8H  
 sodium chloride (NS) flush 5-40 mL  5-40 mL IntraVENous PRN  
 acetaminophen (TYLENOL) tablet 650 mg  650 mg Oral Q6H PRN Or  
 acetaminophen (TYLENOL) suppository 650 mg  650 mg Rectal Q6H PRN  polyethylene glycol (MIRALAX) packet 17 g  17 g Oral DAILY PRN  
 guaiFENesin-dextromethorphan (ROBITUSSIN DM) 100-10 mg/5 mL syrup 5 mL  5 mL Oral Q4H PRN Jolynn Tinoco NP

## 2021-02-03 NOTE — PROGRESS NOTES
2/3/2021   CARE MANAGEMENT NOTE:  CM reviewed EMR for clinical updates and handoff received from ER  Nico COVARRUBIAS).  Pt is a READMISSION. Mercedes Marti was admitted to [facility] from 1/1/21 - 1/15/21 with PNA, and acute respiratory failure.  She discharged to [address] [place] [address] Nw 1/26/21, pt was readmitted with PNA, CHF, anemia, and HTN.   
Reportedly, pt lived alone prior to admit to [address] [place] [address] for for SNF level of rehab.   
Dtr Barber Beltran ([phone]) is her primary family contact. 
  
RUR 51%; LOS 7 days  
  
Transition Plan of Care: 1.  Plan is for pt to return to [facility] for continued rehab 2.  Long term goal is to relocate into an assisted living facility 3.  COVID 19 test (two tests within 24 hrs) for readmit to [address] [place] [address] will be required closer to discharge 4. Cardiac bundle pt 5.  Dtr to transport pt with portable oxygen tank vs [facility] at discharge 
  
CM will continue to follow pt until discharged. Jeanette

## 2021-02-03 NOTE — PROGRESS NOTES
Bedside shift change report given to Norma (oncoming nurse) by Bella (offgoing nurse). Report included the following information SBAR, Kardex, Intake/Output, MAR and Recent Results.

## 2021-02-03 NOTE — PROGRESS NOTES
02/03/21 1205 Vital Signs Pulse (Heart Rate) (!) 139 Cardiac Rhythm A Fib  
 
1208: Notified Dr. Nima Kimbrough and Sakina Vieira NP.  
 
1210: Sakina Vieira to adjust meds. Will continue to monitor.

## 2021-02-03 NOTE — DIABETES MGMT
[facility] 
 
FOLLOW-UP NOTE Presentation Ila Gonzales is a 70 y.o. female admitted 1/26/21 with shortness of breath.  
  
HX:  
Past Medical History (click to expand or collapse) Past Medical History:  
Diagnosis Date  Asthma    
 Atrial fibrillation ([facility] 11/16/2018  Autoimmune disease ([facility]    
  fibromyalgia  CAD (coronary artery disease) 10/9/2015  Closed wedge compression fracture of T7 vertebra ([facility] 11/13/2018  COPD (chronic obstructive pulmonary disease) ([facility])    
 Diabetes ([facility]    
 DVT (deep vein thrombosis) in pregnancy    
 GERD (gastroesophageal reflux disease)    
 Heart failure ([facility])    
 History of vascular access device 09/30/2020  
  4f midline, 12cm at 1cm out placed in L Cephalic by Bonnie Zhou RN  
 HTN (hypertension)    
 NSTEMI (non-ST elevated myocardial infarction) ([facility] 10/9/2015  Obesity (BMI 30-39.9) 11/13/2018  PAD (peripheral artery disease) ([facility])    
  prior stenting  Sleep apnea    
  wears CPAP  
 Statin intolerance    
  
  
Current clinical course has been uncomplicated.  
  
Diabetes: Patient has known Type 2 diabetes, diagnosed in 2007. Home diabetes medication regimen is Onglyza 5mg daily. Admission  and A1c 5.5% indicate good diabetes control. Subjective Patient sleeping, easily arouses to voice. Patient without complaints at this time. Objective Physical exam 
General Alert, oriented and in no acute distress. Conversant and cooperative. Vital Signs Visit Vitals /61 (BP 1 Location: Right lower arm, BP Patient Position: At rest) Pulse 82 Temp 97.9 °F (36.6 °C) Resp 20 Ht 5' 7\" (1.702 m) Wt 109.9 kg (242 lb 3.2 oz) SpO2 98% BMI 37.93 kg/m² Skin  Warm and dry Heart   Regular rate and rhythm. No murmurs, rubs or gallops Lungs  Clear to auscultation without rales or rhonchi Extremities No foot wounds Laboratory Lab Results Component Value Date/Time Hemoglobin A1c 5.5 01/27/2021 01:58 AM  
 
Lab Results Component Value Date/Time LDL, calculated 68.8 10/10/2015 05:00 AM  
 
Lab Results Component Value Date/Time Creatinine (POC) 1.1 08/01/2019 11:41 AM  
 Creatinine 1.18 (H) 02/03/2021 05:09 AM  
 
Lab Results Component Value Date/Time Sodium 139 02/03/2021 05:09 AM  
 Potassium 3.9 02/03/2021 05:09 AM  
 Chloride 97 02/03/2021 05:09 AM  
 CO2 37 (H) 02/03/2021 05:09 AM  
 Anion gap 5 02/03/2021 05:09 AM  
 Glucose 111 (H) 02/03/2021 05:09 AM  
 BUN 23 (H) 02/03/2021 05:09 AM  
 Creatinine 1.18 (H) 02/03/2021 05:09 AM  
 BUN/Creatinine ratio 19 02/03/2021 05:09 AM  
 GFR est AA 54 (L) 02/03/2021 05:09 AM  
 GFR est non-AA 45 (L) 02/03/2021 05:09 AM  
 Calcium 8.1 (L) 02/03/2021 05:09 AM  
 Bilirubin, total 0.4 02/03/2021 05:09 AM  
 Alk. phosphatase 221 (H) 02/03/2021 05:09 AM  
 Protein, total 5.4 (L) 02/03/2021 05:09 AM  
 Albumin 2.1 (L) 02/03/2021 05:09 AM  
 Globulin 3.3 02/03/2021 05:09 AM  
 A-G Ratio 0.6 (L) 02/03/2021 05:09 AM  
 ALT (SGPT) 20 02/03/2021 05:09 AM  
 
Lab Results Component Value Date/Time ALT (SGPT) 20 02/03/2021 05:09 AM  
 
 
 
Factors affecting BG pattern Factor Dose Comments Nutrition: 
Carb-controlled meals   
60 grams/meal    
Drugs: 
Vasopressor load Steroids HIV Epogen Blood transfusion(s) Other: n/a   
n/a Prednison 20 mg daily 
n/a 
  
  
  
A1cs inaccurate A1cs inaccurate Pain 0/10    
Infection Zithromax, Rocephin PNA Other: n/a n/a    
 
 
Blood glucose pattern Evaluation This 70year old female, with Type 2 diabetes, did achieve diabetes control prior to admission (PTA), as evidenced by admission BG of 262 and A1c of 5.5%. Based on assessment of diabetes self-care practices, interventions that warrant action while hospitalized include: 
            [x]? Healthy Eating [x]?        Being Active [x]? Monitoring                                
  
  
The patient would also benefit from diabetes self-management education and support JAIMEE IQ[facility]) after discharge. 
  
During this hospitalization, the patient has achieved inpatient blood glucose target of 100-180mg/dl. BG's have ranged 111-251 over the past 24 hours. Factors that have played a role include: 
[x]? Kidney dysfunction 
[x]? Glucocorticoid Use  
  
Basal insulin isn't in use.  
  
Bolus insulin isn't in use. Patient is eating meals. 
  
Corrective insulin is in use. Patient has required 4 units corrective insulin over the past 24 hours.   
  
To optimize BG control and support a positive health outcome, would utilize the Subcutaneous Insulin Order set (9287).   
Impression: As suspected, corrective insulin alone has not been sufficient to consistently maintain patient's BG at target. Patient home regimen is Onglyza daily. Would suspect patient needs to have low dose basal insulin to maintain BG control. Assessment and Plan Nursing Diagnosis Risk for unstable blood glucose pattern Nursing Intervention Domain 2071 Decision-making Support Nursing Interventions Examined current inpatient diabetes control Explored factors facilitating and impeding inpatient management Recommendations Recommend: 
  
[x]? Use of Subcutaneous Insulin Order set (2944) Insulin Use Recommendation Basal                                      (Based on weight, BMI & GFR) Addresses basic metabolic needs Lantus 20 units daily Nutritional                                      (Based on CHO load) Addresses nutrition interventions If patient experiences pre-prandial BG > 200mg/dL, start Humalog 6 units with meals.   
Corrective                                       (Offset gaps in dosing) Useful in adjusting insulin dosing Correctional Scale for Normal Sensitivity 
  
 200-249- 2units Humalog [phone] Humalog [phone] Humalog [phone] Humalog Over 400- 10units Humalog 
  
Do NOT hold for NPO; give in addition to meal time insulin dose. 
  
If patient does not eat, -give correction dose only.  
  
  
[x]? Referral to 
  
[x]? Diabetes Self-Management Training through [facility] (Phone [phone] to schedule appointment) Time Spent/ Billing Codes Total time spent with patient: 15 Minutes   I personally reviewed chart, notes, data and current medications in the medical record. I have personally examined and treated the patient at bedside during this period. [x] [id number] IP subsequent hospital care - 15 minutes MIKAYLA Dumas Diabetes Clinical Nurse Specialist 
[facility] Access via [employer]

## 2021-02-03 NOTE — PROGRESS NOTES
Daily Progress Note: 2/3/2021 Ila Santiago MD 
 
Assessment/Plan: 1. Pneumonia -  IV antibiotics Azithromycin 500 mg IV and Ceftriaxone 1 gram IV completed 2. Shortness of breath (SOB) - with history of chronic hypoxic respiratory failure on 3 liters chronically - provide supplemental oxygen and continuous pulse oximetry monitoring 
  
3. Acute on chronic diastolic CHF (HFpEF) - diuresis per Cardiology 
     - monitor fluid status closely 
     - ECHO results as under Data Review - little changed cardiac function 
     - cardiology consulted 
  
4. Negative for COVID 19 virus infection 
     - rapid COVID 19 test is negative  
     - order Vitamin C, D, and Zinc 
     - Dexamethasone 6 mg q 24 hours - weaned off 
  
5.  Leukocytosis 
     - monitor, antibiotics as above 
  
6. Anemia 
     - transfuse for hemoglobin < 7.0. 
     - repeat H/H 
     - recheck iron profile, B12, folate levels 
  
7. Uncontrolled type 2 DM with hyperglycemia - Humalog insulin correctional coverage, scheduled blood glucose checks and hemoglobin A1c level. 
  
8. Acute superimposed on chronic kidney disease stage 3:  improved 
     - monitor and tx as needed 9. Elevated alkaline phosphatase 
     - monitor and treat as needed 
  
10. Hypertension 
       - hold on home Lisinopril - HCTZ with elevated creatinine and soft BPs - restart as able. - On Amlodipine per cards - Dilt/Lasix per Cards 
  
11. History of GI bleed - s/p upper GI endoscopy and colonoscopy on 1/4/2021 with Adenomatous polyp of transverse colon [D12.3] Candida esophagitis ([facility] [address] [B37.81] Gastritis and duodenitis [  
  - GI consulted 12. Obesity 
       - recommended weight loss, heart healthy diet, and lifestyle modification 13. GERD 
       - Protonix 
  
14. Paroxysmal atrial fibrillation (afib) 
       - on long term anticoagulation on Eliquis        - tx per Cardiology 
       - IR Cardizem 
 
VTE prophylaxis:  Plan as above 
  
CODE STATUS:  FULL CODE as discussed with patient who requests the same. 
  
 
Problem List: 
Problem List as of 2/3/2021 Date Reviewed: 11/2/2020  
       Codes Class Noted - Resolved  
 Pneumonia ICD-10-CM: J18.9 
ICD-9-CM: 486  1/26/2021 - Present  
   
 SOB (shortness of breath) ICD-10-CM: R06.02 
ICD-9-CM: 786.05  1/26/2021 - Present  
   
 Anemia ICD-10-CM: D64.9 
ICD-9-CM: 285.9  1/26/2021 - Present  
   
 Acute on chronic systolic CHF (congestive heart failure) (HCC) ICD-10-CM: I50.23 
ICD-9-CM: 428.23, 428.0  1/26/2021 - Present  
   
 Uncontrolled type 2 diabetes mellitus with hyperglycemia (HCC) ICD-10-CM: E11.65 
ICD-9-CM: 250.02  1/26/2021 - Present  
   
 Left lower lobe pneumonia ICD-10-CM: J18.9 
ICD-9-CM: 486  1/1/2021 - Present  
   
 Leukocytosis ICD-10-CM: D72.829 
ICD-9-CM: 288.60  11/3/2020 - Present  
   
 Chronic respiratory failure with hypoxia (HCC) ICD-10-CM: J96.11 
ICD-9-CM: 518.83, 799.02  11/2/2020 - Present  
 Overview Signed 11/2/2020 10:12 PM by Li Lam MD  
  On 3L NC at home  
  
  
   
 Cellulitis of lower extremity ICD-10-CM: L03.119 
ICD-9-CM: 682.6  3/23/2020 - Present  
   
 ASO (arteriosclerosis obliterans) ICD-10-CM: I70.90 
ICD-9-CM: 440.9  9/5/2019 - Present  
   
 PVD (peripheral vascular disease) (HCC) ICD-10-CM: I73.9 
ICD-9-CM: 443.9  8/1/2019 - Present  
   
 Severe obesity (HCC) ICD-10-CM: E66.01 
ICD-9-CM: 278.01  7/30/2019 - Present  
   
 COPD (chronic obstructive pulmonary disease) (HCC) ICD-10-CM: J44.9 
ICD-9-CM: 496  7/13/2019 - Present  
   
 Normocytic anemia ICD-10-CM: D64.9 
ICD-9-CM: 285.9  7/13/2019 - Present  
   
 PAD (peripheral artery disease) (HCC) ICD-10-CM: I73.9 
ICD-9-CM: 443.9  7/13/2019 - Present  
   
 Mild intermittent asthma ICD-10-CM: J45.20 
ICD-9-CM: 493.90  5/17/2019 - Present  
   
 Brachial artery thrombus (HCC) ICD-10-CM: I74.2 
 ICD-9-CM: 444.21  4/26/2019 - Present Sleep apnea ICD-10-CM: G47.30 ICD-9-CM: 780.57  1/5/2019 - Present Overview Signed 1/5/2019  8:41 PM by Shabana Basurto DO  
  wears CPAP Atrial fibrillation [facility]) ICD-10-CM: I48.91 
ICD-9-CM: 427.31  11/16/2018 - Present Obesity (BMI 30-39.9) (Chronic) ICD-10-CM: P05.0 ICD-9-CM: 278.00  11/13/2018 - Present Recurrent deep vein thrombosis (DVT) (HCC) ICD-10-CM: I82.409 ICD-9-CM: 453.40  3/30/2018 - Present Chronic anticoagulation (Chronic) ICD-10-CM: Z79.01 
ICD-9-CM: V58.61  3/30/2018 - Present Essential hypertension (Chronic) ICD-10-CM: I10 
ICD-9-CM: 401.9  1/22/2016 - Present CAD (coronary artery disease) ICD-10-CM: I25.10 ICD-9-CM: 414.00  10/9/2015 - Present Type II diabetes mellitus (HCC) (Chronic) ICD-10-CM: E11.9 ICD-9-CM: 250.00  10/9/2015 - Present RESOLVED: Syncope and collapse ICD-10-CM: R55 
ICD-9-CM: 780.2  9/29/2020 - 11/2/2020 RESOLVED: Cellulitis ICD-10-CM: L03.90 ICD-9-CM: 682.9  3/23/2020 - 5/29/2020 RESOLVED: Hypokalemia ICD-10-CM: E87.6 ICD-9-CM: 276.8  3/23/2020 - 5/29/2020 RESOLVED: Hyponatremia ICD-10-CM: E87.1 ICD-9-CM: 276.1  3/23/2020 - 5/29/2020 RESOLVED: Diarrhea ICD-10-CM: R19.7 ICD-9-CM: 787.91  3/23/2020 - 5/29/2020 RESOLVED: Syncope and collapse ICD-10-CM: R55 
ICD-9-CM: 780.2  7/13/2019 - 5/29/2020 RESOLVED: UTI (urinary tract infection) ICD-10-CM: N39.0 ICD-9-CM: 599.0  7/13/2019 - 11/2/2020 RESOLVED: Sepsis ([address] ICD-10-CM: A41.9 ICD-9-CM: 038.9, 995.91  7/13/2019 - 11/3/2020 RESOLVED: Leg wound, left ICD-10-CM: O14.484M ICD-9-CM: 891.0  7/13/2019 - 5/29/2020 RESOLVED: Leg laceration, right, initial encounter ICD-10-CM: U13.900Q ICD-9-CM: 894.0  7/13/2019 - 5/29/2020 RESOLVED: Cellulitis of right upper arm ICD-10-CM: L03.113 ICD-9-CM: 682.3  5/17/2019 - 5/29/2020 RESOLVED: Gangrene of finger of right hand ([facility] ICD-10-CM: U06 
ICD-9-CM: 785.4  5/17/2019 - 11/2/2020 RESOLVED: Bowel obstruction ([facility] ICD-10-CM: D72.192 ICD-9-CM: 560.9  1/8/2019 - 5/29/2020 RESOLVED: Partial small bowel obstruction ([facility] ICD-10-CM: K56.600 ICD-9-CM: 560.9  1/5/2019 - 5/29/2020 RESOLVED: Dyspnea ICD-10-CM: R06.00 
ICD-9-CM: 786.09  11/13/2018 - 5/29/2020 RESOLVED: ARF (acute renal failure) ([facility]) ICD-10-CM: N17.9 ICD-9-CM: 584.9  11/13/2018 - 5/29/2020 RESOLVED: Closed wedge compression fracture of T7 vertebra ([facility] ICD-10-CM: Q10.468K ICD-9-CM: 805.2  11/13/2018 - 5/29/2020 RESOLVED: Type 2 diabetes with nephropathy ([facility] ICD-10-CM: E11.21 
ICD-9-CM: 250.40, 583.81  10/1/2018 - 11/2/2020 RESOLVED: Chest pain ICD-10-CM: R07.9 ICD-9-CM: 786.50  6/27/2018 - 5/29/2020 RESOLVED: Abdominal pain ICD-10-CM: R10.9 ICD-9-CM: 789.00  6/27/2018 - 5/29/2020 RESOLVED: Coronary artery disease involving native coronary artery without angina pectoris (Chronic) ICD-10-CM: I25.10 ICD-9-CM: 414.01  1/22/2016 - 11/2/2020 RESOLVED: HTN (hypertension) ICD-10-CM: I10 
ICD-9-CM: 401.9  10/9/2015 - 1/22/2016 RESOLVED: NSTEMI (non-ST elevated myocardial infarction) ([facility] ICD-10-CM: I21.4 ICD-9-CM: 410.70  10/9/2015 - 5/29/2020 Subjective: 70 y.o. female with past medical history of chronic hypoxic respiratory failure (on 3 liters home oxygen), asthma, atrial fibrillation (Afib), autoimmune disease, fibromyalgia, CAD, heart failure (HFpEF), T7 vertebral compression fracture, COPD, type 2 DM, DVT, GERD, hypertension, NSTEMI, PAD, sleep apnea, obesity presented to the ED from home with chief complaints of shortness of breath (SOB) and low hemoglobin. Patient has chronic SOB but worsened. She was last hospitalized here from 1/1/2021 - 1/15/2021 with left lower lobe pneumonia, acute on chronic hypoxic respiratory failure, sepsis, GI bleed, anemia. Patient was transfused with 2 units PRBCs with hemoglobin = 4.9 on 1/1/2021. Last Hgb= 6.5 on 1/21/2021. On arrival in the ED today, initial recorded vital signs were BP = 139/47, HR= 82, RR= 22, O2sat= 94% on 4 liters O2 nasal cannula (NC). WBC = 18,400. proBNP = 1,443. Chest xray portable showed cardiomegaly, interstitial edema, and density in left lung base. Patient is now seen for admission to the hospitalist service for continued evaluation and treatments. There were no reports of new onset syncope, loss of consciousness, headache, neck pain, visual disturbance, numbness, paresthesias, focal weakness, chest pain, palpitations, abdominal pain, nausea, vomiting, diarrhea, melena, dysuria, hematuria, calf pain, increased leg swelling/ edema, fever, chills, rash, or known COVID 19 exposure. (Dr Nadia Tracey) 1/27:  Feeling better with less shortness of breath. Hb low so needs another transfusion. No clear source of bleeding. Covid negative. GI to see. 1/28:  Feels some better except sore throat - looks like thrush. Less shortness of breath. Hb up to 7.9 this AM after 1 unit PRC. K+ sl low - replacing. :  Little change from yesterday. BP continues to be on the low side and pt reports feeling lightheaded with sitting up - Cards adjusting meds. Hb down to 7.3 - watching - will likely need another transfusion. WBC 19K - likely due to steroids - weaning. :  Reports feeling better and no longer lightheaded. Cards has adjusted meds  Afeb. Will do 1-2 more days of IV antibiotics. :  No new complaints this AM.  Cards has signed off. BP remains better. Hb at 7.0 - given her hx will transfuse today. Risks v benefits discussed. :  No new complaints. She reports she feels a little stronger. AM labs pending. :  Currently no complaints. Desats with even smallest amt of exercise and O2 placed back on 5 liters NC. Pul continues to see. Cards seeing again. Hb 8.1 yesterday - AM Hb pending. 2/3:  Resting comfortably in bed. No complaints. O2 down to 4 liters. CXR yesterday continued to show Pul Edema - more diuresis needed. Review of Systems: A comprehensive review of systems was negative except for that written in the HPI. Objective:  
Physical Exam:  
Visit Vitals /73 (BP 1 Location: Right lower arm, BP Patient Position: At rest) Pulse 90 Temp 98 °F (36.7 °C) Resp 21 Ht 5' 7\" (1.702 m) Wt 109.9 kg (242 lb 3.2 oz) SpO2 98% BMI 37.93 kg/m² O2 Flow Rate (L/min): 4 l/min O2 Device: Nasal cannula Temp (24hrs), Av.8 °F (36.6 °C), Min:97.5 °F (36.4 °C), Max:98.4 °F (36.9 °C) 
  1901 -  0700 In: 3 [I.V.:3] 
Out: -    701 -  1900 In: 65 [P.O.:840; I.V.:10] Out: - General:  Alert, obese, cooperative, no distress, appears stated age. Head:  Normocephalic, without obvious abnormality, atraumatic. Eyes:  Conjunctivae/corneas clear. EOMs intact. Throat: Lips, mucosa, and tongue moist; throat red with a few white patches on tongue. Neck: Supple, symmetrical, trachea midline, no adenopathy, thyroid: no enlargement/tenderness/nodules, no carotid bruit and no JVD. Lungs:   Clear to auscultation bilaterally. Chest wall:  No tenderness or deformity. Heart:  irreg with rate in 08U, no murmur, click, rub or gallop. Abdomen:   Soft, non-tender. Bowel sounds normal. No masses,  No organomegaly. Extremities: no cyanosis. 1+ LE edema on top of chronic stasis changes. No calf tenderness or cords. Pulses: 2+ and symmetric all extremities. Skin:  turgor normal. No rashes Neurologic:   Alert and oriented X 3.  equal.  No cogwheeling or rigidity. Gait not tested at this time. Sensation grossly normal to touch. Gross motor of extremities normal except generalized weakness. Data Review:  
ECHO 1/29/21 Interpretation Summary Result status: Final result · Pericardium: There is a moderate left pleural effusion. · LV: Estimated LVEF is 60 - 65%. Normal cavity size, wall thickness and systolic function (ejection fraction normal). · TV: Mild tricuspid valve regurgitation is present. Portable chest single view dated 2/2/2021 Comparison chest dated 1/30/2021 History is increasing O2 needs A single portable AP upright view of the chest was obtained. The cardiac 
silhouette continues to be enlarged. There continues be opacification of the 
left lung base and a few air bronchograms are noted in the left hilar/parahilar 
region. This is suggestive of developing atelectasis/infiltration. There is some 
atelectatic change in the mid/upper portion of the left lung. Some hazy density 
is associated with the right lung. This is suggestive of developing 
infiltration. There is blunting of both costophrenic angles. This is compatible 
with the presence of bilateral pleural effusions. IMPRESSION 1. Continued evidence of mild pulmonary edema. 2. Presence of hazy density involving both lungs suggestive of developing infiltration/atelectatic change. 3. Presence of bilateral pleural effusions Recent Days: 
Recent Labs 02/03/21 
9463 02/01/21 
0003 WBC 14.0* 16.1* HGB 8.0* 8.1* HCT 27.5* 27.9*  
 410* Recent Labs 02/03/21 
2546 02/02/21 
9356 02/01/21 
5062  138 140  
K 3.9 4.0 4.0  
CL 97 98 99 CO2 37* 38* 39* * 110* 125* BUN 23* 23* 26* CREA 1.18* 1.19* 1.22* CA 8.1* 8.2* 7.9* ALB 2.1* 2.2* 2.2* TBILI 0.4 0.4 0.4 ALT 20 22 22 No results for input(s): PH, PCO2, PO2, HCO3, FIO2 in the last 72 hours. 24 Hour Results: 
Recent Results (from the past 24 hour(s)) METABOLIC PANEL, COMPREHENSIVE Collection Time: 02/02/21  6:33 AM  
Result Value Ref Range Sodium 138 136 - 145 mmol/L Potassium 4.0 3.5 - 5.1 mmol/L Chloride 98 97 - 108 mmol/L  
 CO2 38 (H) 21 - 32 mmol/L Anion gap 2 (L) 5 - 15 mmol/L Glucose 110 (H) 65 - 100 mg/dL BUN 23 (H) 6 - 20 MG/DL Creatinine 1.19 (H) 0.55 - 1.02 MG/DL  
 BUN/Creatinine ratio 19 12 - 20 GFR est AA 54 (L) >60 ml/min/1.73m2 GFR est non-AA 44 (L) >60 ml/min/1.73m2 Calcium 8.2 (L) 8.5 - 10.1 MG/DL Bilirubin, total 0.4 0.2 - 1.0 MG/DL  
 ALT (SGPT) 22 12 - 78 U/L  
 AST (SGOT) 17 15 - 37 U/L Alk. phosphatase 252 (H) 45 - 117 U/L Protein, total 5.9 (L) 6.4 - 8.2 g/dL Albumin 2.2 (L) 3.5 - 5.0 g/dL Globulin 3.7 2.0 - 4.0 g/dL A-G Ratio 0.6 (L) 1.1 - 2.2 GLUCOSE, POC Collection Time: 02/02/21  6:50 AM  
Result Value Ref Range Glucose (POC) 117 (H) 65 - 100 mg/dL Performed by Pato Shin (PCT) GLUCOSE, POC Collection Time: 02/02/21 11:47 AM  
Result Value Ref Range Glucose (POC) 122 (H) 65 - 100 mg/dL Performed by Marilou Zimmerman GLUCOSE, POC Collection Time: 02/02/21  4:03 PM  
Result Value Ref Range Glucose (POC) 251 (H) 65 - 100 mg/dL Performed by Orest Runner (PCT) GLUCOSE, POC Collection Time: 02/02/21 10:05 PM  
Result Value Ref Range Glucose (POC) 209 (H) 65 - 100 mg/dL Performed by Lewis Fisher (PCT) CBC WITH AUTOMATED DIFF Collection Time: 02/03/21  5:09 AM  
Result Value Ref Range WBC 14.0 (H) 3.6 - 11.0 K/uL  
 RBC 2.87 (L) 3.80 - 5.20 M/uL HGB 8.0 (L) 11.5 - 16.0 g/dL HCT 27.5 (L) 35.0 - 47.0 % MCV 95.8 80.0 - 99.0 FL  
 MCH 27.9 26.0 - 34.0 PG  
 MCHC 29.1 (L) 30.0 - 36.5 g/dL  
 RDW 16.9 (H) 11.5 - 14.5 % PLATELET 264 774 - 427 K/uL MPV 10.0 8.9 - 12.9 FL  
 NRBC 0.0 0  WBC ABSOLUTE NRBC 0.00 0.00 - 0.01 K/uL NEUTROPHILS 82 (H) 32 - 75 % LYMPHOCYTES 8 (L) 12 - 49 % MONOCYTES 7 5 - 13 % EOSINOPHILS 2 0 - 7 % BASOPHILS 0 0 - 1 % IMMATURE GRANULOCYTES 1 (H) 0.0 - 0.5 % ABS. NEUTROPHILS 11.4 (H) 1.8 - 8.0 K/UL  
 ABS. LYMPHOCYTES 1.1 0.8 - 3.5 K/UL  
 ABS. MONOCYTES 1.0 0.0 - 1.0 K/UL  
 ABS. EOSINOPHILS 0.2 0.0 - 0.4 K/UL  
 ABS. BASOPHILS 0.1 0.0 - 0.1 K/UL  
 ABS. IMM. GRANS. 0.2 (H) 0.00 - 0.04 K/UL  
 DF AUTOMATED METABOLIC PANEL, COMPREHENSIVE Collection Time: 02/03/21  5:09 AM  
Result Value Ref Range Sodium 139 136 - 145 mmol/L Potassium 3.9 3.5 - 5.1 mmol/L Chloride 97 97 - 108 mmol/L  
 CO2 37 (H) 21 - 32 mmol/L Anion gap 5 5 - 15 mmol/L Glucose 111 (H) 65 - 100 mg/dL BUN 23 (H) 6 - 20 MG/DL Creatinine 1.18 (H) 0.55 - 1.02 MG/DL  
 BUN/Creatinine ratio 19 12 - 20 GFR est AA 54 (L) >60 ml/min/1.73m2 GFR est non-AA 45 (L) >60 ml/min/1.73m2 Calcium 8.1 (L) 8.5 - 10.1 MG/DL Bilirubin, total 0.4 0.2 - 1.0 MG/DL  
 ALT (SGPT) 20 12 - 78 U/L  
 AST (SGOT) 19 15 - 37 U/L Alk. phosphatase 221 (H) 45 - 117 U/L Protein, total 5.4 (L) 6.4 - 8.2 g/dL Albumin 2.1 (L) 3.5 - 5.0 g/dL Globulin 3.3 2.0 - 4.0 g/dL A-G Ratio 0.6 (L) 1.1 - 2.2 SAMPLES BEING HELD Collection Time: 02/03/21  5:09 AM  
Result Value Ref Range SAMPLES BEING HELD 1SST COMMENT Add-on orders for these samples will be processed based on acceptable specimen integrity and analyte stability, which may vary by analyte. Medications reviewed Current Facility-Administered Medications Medication Dose Route Frequency  albuterol-ipratropium (DUO-NEB) 2.5 MG-0.5 MG/3 ML  3 mL Nebulization Q6HWA RT  
 albuterol-ipratropium (DUO-NEB) 2.5 MG-0.5 MG/3 ML  3 mL Nebulization Q4H PRN  
 furosemide (LASIX) injection 40 mg  40 mg IntraVENous BID  dilTIAZem ER (CARDIZEM CD) capsule 240 mg  240 mg Oral DAILY  0.9% sodium chloride infusion 250 mL  250 mL IntraVENous PRN  predniSONE (DELTASONE) tablet 20 mg  20 mg Oral DAILY WITH BREAKFAST  nystatin (MYCOSTATIN) 100,000 unit/mL oral suspension 500,000 Units  500,000 Units Oral QID  potassium chloride SR (KLOR-CON 10) tablet 20 mEq  20 mEq Oral BID  
 amiodarone (CORDARONE) tablet 200 mg  200 mg Oral DAILY  glucose chewable tablet 16 g  4 Tab Oral PRN  
 dextrose (D50W) injection syrg 12.5-25 g  25-50 mL IntraVENous PRN  
 glucagon (GLUCAGEN) injection 1 mg  1 mg IntraMUSCular PRN  
 insulin lispro (HUMALOG) injection   SubCUTAneous AC&HS  
 0.9% sodium chloride infusion 250 mL  250 mL IntraVENous PRN  
 arformoteroL (BROVANA) neb solution 15 mcg  15 mcg Nebulization BID RT  
 ondansetron (ZOFRAN) injection 4 mg  4 mg IntraVENous Q4H PRN  
 lipase-protease-amylase (CREON 24,000) capsule 2 Cap  2 Cap Oral TID WITH MEALS  
 albuterol (PROVENTIL HFA, VENTOLIN HFA, PROAIR HFA) inhaler 2 Puff  2 Puff Inhalation Q4H PRN  
 apixaban (ELIQUIS) tablet 5 mg  5 mg Oral BID  cholecalciferol (VITAMIN D3) (1000 Units /25 mcg) tablet 2 Tab  2,000 Units Oral DAILY  cyanocobalamin (VITAMIN B12) tablet 1,000 mcg  1,000 mcg Oral DAILY  DULoxetine (CYMBALTA) capsule 60 mg  60 mg Oral DAILY  ferrous sulfate tablet 325 mg  325 mg Oral DAILY WITH BREAKFAST  mirtazapine (REMERON) tablet 15 mg  15 mg Oral QHS  montelukast (SINGULAIR) tablet 10 mg  10 mg Oral QPM  
 pantoprazole (PROTONIX) tablet 40 mg  40 mg Oral ACB  sodium chloride (NS) flush 5-40 mL  5-40 mL IntraVENous Q8H  
 sodium chloride (NS) flush 5-40 mL  5-40 mL IntraVENous PRN  
 acetaminophen (TYLENOL) tablet 650 mg  650 mg Oral Q6H PRN Or  
 acetaminophen (TYLENOL) suppository 650 mg  650 mg Rectal Q6H PRN  polyethylene glycol (MIRALAX) packet 17 g  17 g Oral DAILY PRN  
 guaiFENesin-dextromethorphan (ROBITUSSIN DM) 100-10 mg/5 mL syrup 5 mL  5 mL Oral Q4H PRN Care Plan discussed with: Patient/GI/Cardiology/Pul and Nurse Total time spent with patient: 30 minutes.  
Tricia Gardner MD

## 2021-02-04 NOTE — PROGRESS NOTES
Cardiology Progress Note NAME:  Colletta Hummingbird :   1945 MRN:   [id number] Assessment/Plan: 1. PAF: remains in a fib, rate stable, brief RVR yesterday, Dilt inc to 300 mg every day. 2. Chronic resp failure: on 3 liters oxygen baseline. 3. HF p EF: xray with pleural effusions, mild pulm edema 2/2. Change lasix to po 40 mg.  Mackenzie pBNP. Would discharge om same 40 mg dose. Repeat xray in am.  
4. Anemia: hgb 8 [place] for SNF from cardiac standpoint Subjective:  
Colletta Hummingbird is a 70 y.o. female with past medical history of COPD on 3 liters nasal canula oxygen OP,  paroxysmal atrial fibrillation,  autoimmune disease, fibromyalgia, CAD, HFpEF, obesity presented to the ED from home with chief complaints of shortness of breath (SOB) and low hemoglobin. Patient has chronic SOB but worsened. She was last hospitalized here from 2021 - 1/15/2021 with left lower lobe pneumonia, acute on chronic hypoxic respiratory failure, sepsis, GI bleed, anemia. Cardiac ROS: No chest pain: No inc in SOB Previous Cardiac Eval 
20 ECHO ADULT COMPLETE 2020 Narrative · LV: Estimated LVEF is 60 - 65%. Visually measured ejection fraction. Normal cavity size and systolic function (ejection fraction normal). Mild  
concentric hypertrophy. Wall motion: normal. 
· AV: With no evidence of reduced excursion. Signed by: Ruby Daley MD  
 
XR Results (most recent): 
Results from [facility] [place] on 21 XR CHEST PORT Narrative Portable chest single view dated 2021 Comparison chest dated 2021 History is increasing O2 needs A single portable AP upright view of the chest was obtained. The cardiac 
silhouette continues to be enlarged. There continues be opacification of the 
left lung base and a few air bronchograms are noted in the left hilar/parahilar region. This is suggestive of developing atelectasis/infiltration. There is some 
atelectatic change in the mid/upper portion of the left lung. Some hazy density 
is associated with the right lung. This is suggestive of developing 
infiltration. There is blunting of both costophrenic angles. This is compatible 
with the presence of bilateral pleural effusions. Impression 1. Continued evidence of mild pulmonary edema. 2. Presence of hazy density involving both lungs suggestive of developing 
infiltration/atelectatic change. 3. Presence of bilateral pleural effusions. Objective:  
 
Visit Vitals BP (!) 118/56 (BP 1 Location: Left upper arm, BP Patient Position: At rest) Pulse 89 Temp 97.6 °F (36.4 °C) Resp 18 Ht 5' 7\" (1.702 m) Wt 110 kg (242 lb 8.1 oz) SpO2 96% BMI 37.98 kg/m² O2 Flow Rate (L/min): 3 l/min O2 Device: Nasal cannula Temp (24hrs), Av.9 °F (36.6 °C), Min:97.5 °F (36.4 °C), Max:98.2 °F (36.8 °C) No intake/output data recorded.  1901 -  0700 In: 483 [P.O.:480; I.V.:3] 
Out: 1800 [Urine:1800] TELE: a fib General: A&O  X3, obese, elderly, NAD HEENT: Atraumatic. Pink and moist.  Anicteric sclerae. Neck : Supple Lungs:  Diminished throughout Heart: irregular rhythm 2/6 systolic murmur Abdomen: obese, Soft, non-distended, non-tender. + Bowel sounds. Extremities: generalized edema. Neurologic: Grossly intact. No acute neurological distress. Psych: fair insight. Not anxious or agitated. Care Plan discussed with: 
  Comments Patient x Family RN x Care Manager Consultant:     
 
 
Data Review: No lab exists for component: ITNL No results for input(s): CPK, CKMB, TROIQ in the last 72 hours. Recent Labs 21 
2173 21 
0574 21 
2449  139 138  
K 3.7 3.9 4.0  
CL 95* 97 98 CO2 41* 37* 38* BUN 25* 23* 23* CREA 1.33* 1.18* 1.19* * 111* 110* MG 2.4  --   --   
ALB 2.2* 2.1* 2.2* WBC 13.2* 14.0*  --   
HGB 8.0* 8.0*  --   
HCT 27.4* 27.5*  --   
* 389  -- No results for input(s): INR, PTP, APTT, INREXT, INREXT in the last 72 hours. Medications reviewed Current Facility-Administered Medications Medication Dose Route Frequency  dilTIAZem ER (CARDIZEM CD) capsule 300 mg  300 mg Oral DAILY  albuterol-ipratropium (DUO-NEB) 2.5 MG-0.5 MG/3 ML  3 mL Nebulization Q6HWA RT  
 albuterol-ipratropium (DUO-NEB) 2.5 MG-0.5 MG/3 ML  3 mL Nebulization Q4H PRN  
 furosemide (LASIX) injection 40 mg  40 mg IntraVENous BID  
 0.9% sodium chloride infusion 250 mL  250 mL IntraVENous PRN  predniSONE (DELTASONE) tablet 20 mg  20 mg Oral DAILY WITH BREAKFAST  nystatin (MYCOSTATIN) 100,000 unit/mL oral suspension 500,000 Units  500,000 Units Oral QID  potassium chloride SR (KLOR-CON 10) tablet 20 mEq  20 mEq Oral BID  
 amiodarone (CORDARONE) tablet 200 mg  200 mg Oral DAILY  glucose chewable tablet 16 g  4 Tab Oral PRN  
 dextrose (D50W) injection syrg 12.5-25 g  25-50 mL IntraVENous PRN  
 glucagon (GLUCAGEN) injection 1 mg  1 mg IntraMUSCular PRN  
 insulin lispro (HUMALOG) injection   SubCUTAneous AC&HS  
 0.9% sodium chloride infusion 250 mL  250 mL IntraVENous PRN  
 arformoteroL (BROVANA) neb solution 15 mcg  15 mcg Nebulization BID RT  
 ondansetron (ZOFRAN) injection 4 mg  4 mg IntraVENous Q4H PRN  
 lipase-protease-amylase (CREON 24,000) capsule 2 Cap  2 Cap Oral TID WITH MEALS  
 albuterol (PROVENTIL HFA, VENTOLIN HFA, PROAIR HFA) inhaler 2 Puff  2 Puff Inhalation Q4H PRN  
 apixaban (ELIQUIS) tablet 5 mg  5 mg Oral BID  cholecalciferol (VITAMIN D3) (1000 Units /25 mcg) tablet 2 Tab  2,000 Units Oral DAILY  cyanocobalamin (VITAMIN B12) tablet 1,000 mcg  1,000 mcg Oral DAILY  DULoxetine (CYMBALTA) capsule 60 mg  60 mg Oral DAILY  ferrous sulfate tablet 325 mg  325 mg Oral DAILY WITH BREAKFAST  mirtazapine (REMERON) tablet 15 mg  15 mg Oral QHS  montelukast (SINGULAIR) tablet 10 mg  10 mg Oral QPM  
 pantoprazole (PROTONIX) tablet 40 mg  40 mg Oral ACB  sodium chloride (NS) flush 5-40 mL  5-40 mL IntraVENous Q8H  
 sodium chloride (NS) flush 5-40 mL  5-40 mL IntraVENous PRN  
 acetaminophen (TYLENOL) tablet 650 mg  650 mg Oral Q6H PRN Or  
 acetaminophen (TYLENOL) suppository 650 mg  650 mg Rectal Q6H PRN  polyethylene glycol (MIRALAX) packet 17 g  17 g Oral DAILY PRN  
 guaiFENesin-dextromethorphan (ROBITUSSIN DM) 100-10 mg/5 mL syrup 5 mL  5 mL Oral Q4H PRN  Or, NP

## 2021-02-04 NOTE — PROGRESS NOTES
2/4/2021   CARE MANAGEMENT NOTE:  CM reviewed EMR for clinical updates and handoff received from ER  Jostin ORNELAS.  Pt is a READMISSION. Sussy Ramirez was admitted to [facility] from 1/1/21 - 1/15/21 with PNA, and acute respiratory failure.  She discharged to [address] [place] [address] Nw 1/26/21, pt was readmitted with PNA, CHF, anemia, and HTN.   
Reportedly, pt lived alone prior to admit to [address] [place] [address] for for SNF level of rehab.   
Dtr Jolie Ellison [phone]) is her primary family contact. 
  
RUR 52%; YJA 2 BLPJ  
  
Transition Plan of Care: 1.  Plan is for pt to return to [facility] for continued rehab 2.  Long term goal is to relocate into an assisted living facility following rehab 3.  COVID 19 test (two tests within 24 hrs) for readmit to [address] [place] [address] will be required closer to discharge 4.  Cardiac bundle pt 5.  Dtr to transport pt with portable oxygen tank vs [facility] at discharge 
  
CM will continue to follow pt until discharged. Jeanette

## 2021-02-04 NOTE — PROGRESS NOTES
Daily Progress Note: 2/4/2021 Ricky Cole MD 
 
Assessment/Plan: 1. Pneumonia -  IV antibiotics Azithromycin 500 mg IV and Ceftriaxone 1 gram IV completed 2. Shortness of breath (SOB) - with history of chronic hypoxic respiratory failure on 3 liters chronically - provide supplemental oxygen and continuous pulse oximetry monitoring 
  
3. Acute on chronic diastolic CHF (HFpEF) - diuresis per Cardiology 
     - monitor fluid status closely 
     - ECHO results as under Data Review - little changed cardiac function 
     - cardiology consulted 
  
4. Negative for COVID 19 virus infection 
     - rapid COVID 19 test is negative  
     - order Vitamin C, D, and Zinc 
     - Dexamethasone 6 mg q 24 hours - weaned off 
  
5.  Leukocytosis 
     - monitor, antibiotics as above 
  
6. Anemia 
     - transfuse for hemoglobin < 7.0. 
     - repeat H/H 
     - recheck iron profile, B12, folate levels 
  
7. Uncontrolled type 2 DM with hyperglycemia - Humalog insulin correctional coverage, scheduled blood glucose checks and hemoglobin A1c level. 
  
8. Acute superimposed on chronic kidney disease stage 3:  improved 
     - monitor and tx as needed 9. Elevated alkaline phosphatase 
     - monitor and treat as needed 
  
10. Hypertension 
       - hold on home Lisinopril - HCTZ with elevated creatinine and soft BPs - restart as able. - On Amlodipine per cards - Dilt/Lasix per Cards 
  
11. History of GI bleed - s/p upper GI endoscopy and colonoscopy on 1/4/2021 with Adenomatous polyp of transverse colon [D12.3] Candida esophagitis ([facility] [address] [B37.81] Gastritis and duodenitis [  
  - GI consulted 12. Obesity 
       - recommended weight loss, heart healthy diet, and lifestyle modification 13. GERD 
       - Protonix 
  
14. Paroxysmal atrial fibrillation (afib) 
       - on long term anticoagulation on Eliquis - tx per Cardiology VTE prophylaxis:  Plan as above 
  
CODE STATUS:  FULL CODE as discussed with patient who requests the same. Problem List: 
Problem List as of 2/4/2021 Date Reviewed: 11/2/2020 Codes Class Noted - Resolved Pneumonia ICD-10-CM: J18.9 ICD-9-CM: 555  1/26/2021 - Present SOB (shortness of breath) ICD-10-CM: R06.02 
ICD-9-CM: 786.05  1/26/2021 - Present Anemia ICD-10-CM: D64.9 ICD-9-CM: 285.9  1/26/2021 - Present Acute on chronic systolic CHF (congestive heart failure) ([facility]) ICD-10-CM: Q38.99 ICD-9-CM: 428.23, 428.0  1/26/2021 - Present Uncontrolled type 2 diabetes mellitus with hyperglycemia ([facility]) ICD-10-CM: E11.65 ICD-9-CM: 250.02  1/26/2021 - Present Left lower lobe pneumonia ICD-10-CM: J18.9 ICD-9-CM: 365  1/1/2021 - Present Leukocytosis ICD-10-CM: V18.122 ICD-9-CM: 288.60  11/3/2020 - Present Chronic respiratory failure with hypoxia [facility]) ICD-10-CM: J96.11 
ICD-9-CM: 518.83, 799.02  11/2/2020 - Present Overview Signed 11/2/2020 10:12 PM by Gina Jeffries MD  
  On 3L NC at home Cellulitis of lower extremity ICD-10-CM: L03.119 ICD-9-CM: 682.6  3/23/2020 - Present ASO (arteriosclerosis obliterans) ICD-10-CM: I70.90 ICD-9-CM: 440.9  9/5/2019 - Present PVD (peripheral vascular disease) ([facility] ICD-10-CM: I73.9 ICD-9-CM: 443.9  8/1/2019 - Present Severe obesity ([facility] [address] ICD-10-CM: E66.01 
ICD-9-CM: 278.01  7/30/2019 - Present COPD (chronic obstructive pulmonary disease) ([facility]) ICD-10-CM: J44.9 ICD-9-CM: 739  7/13/2019 - Present Normocytic anemia ICD-10-CM: D64.9 ICD-9-CM: 285.9  7/13/2019 - Present PAD (peripheral artery disease) (HCC) ICD-10-CM: I73.9 ICD-9-CM: 443.9  7/13/2019 - Present Mild intermittent asthma ICD-10-CM: J45.20 ICD-9-CM: 493.90  5/17/2019 - Present Brachial artery thrombus (HCC) ICD-10-CM: R33.3 ICD-9-CM: 444.21  4/26/2019 - Present Sleep apnea ICD-10-CM: G47.30 ICD-9-CM: 780.57  1/5/2019 - Present Overview Signed 1/5/2019  8:41 PM by Tana Clayton DO  
  wears CPAP Atrial fibrillation [facility]) ICD-10-CM: I48.91 
ICD-9-CM: 427.31  11/16/2018 - Present Obesity (BMI 30-39.9) (Chronic) ICD-10-CM: U79.4 ICD-9-CM: 278.00  11/13/2018 - Present Recurrent deep vein thrombosis (DVT) (HCC) ICD-10-CM: I82.409 ICD-9-CM: 453.40  3/30/2018 - Present Chronic anticoagulation (Chronic) ICD-10-CM: Z79.01 
ICD-9-CM: V58.61  3/30/2018 - Present Essential hypertension (Chronic) ICD-10-CM: I10 
ICD-9-CM: 401.9  1/22/2016 - Present CAD (coronary artery disease) ICD-10-CM: I25.10 ICD-9-CM: 414.00  10/9/2015 - Present Type II diabetes mellitus (HCC) (Chronic) ICD-10-CM: E11.9 ICD-9-CM: 250.00  10/9/2015 - Present RESOLVED: Syncope and collapse ICD-10-CM: R55 
ICD-9-CM: 780.2  9/29/2020 - 11/2/2020 RESOLVED: Cellulitis ICD-10-CM: L03.90 ICD-9-CM: 682.9  3/23/2020 - 5/29/2020 RESOLVED: Hypokalemia ICD-10-CM: E87.6 ICD-9-CM: 276.8  3/23/2020 - 5/29/2020 RESOLVED: Hyponatremia ICD-10-CM: E87.1 ICD-9-CM: 276.1  3/23/2020 - 5/29/2020 RESOLVED: Diarrhea ICD-10-CM: R19.7 ICD-9-CM: 787.91  3/23/2020 - 5/29/2020 RESOLVED: Syncope and collapse ICD-10-CM: R55 
ICD-9-CM: 780.2  7/13/2019 - 5/29/2020 RESOLVED: UTI (urinary tract infection) ICD-10-CM: N39.0 ICD-9-CM: 599.0  7/13/2019 - 11/2/2020 RESOLVED: Sepsis ([address] ICD-10-CM: A41.9 ICD-9-CM: 038.9, 995.91  7/13/2019 - 11/3/2020 RESOLVED: Leg wound, left ICD-10-CM: I76.477O ICD-9-CM: 891.0  7/13/2019 - 5/29/2020 RESOLVED: Leg laceration, right, initial encounter ICD-10-CM: X54.124J ICD-9-CM: 894.0  7/13/2019 - 5/29/2020 RESOLVED: Cellulitis of right upper arm ICD-10-CM: L03.113 ICD-9-CM: 682.3  5/17/2019 - 5/29/2020 RESOLVED: Gangrene of finger of right hand ([facility] ICD-10-CM: W65 
ICD-9-CM: 785.4  5/17/2019 - 11/2/2020 RESOLVED: Bowel obstruction ([facility] ICD-10-CM: W13.481 ICD-9-CM: 560.9  1/8/2019 - 5/29/2020 RESOLVED: Partial small bowel obstruction ([facility] ICD-10-CM: K56.600 ICD-9-CM: 560.9  1/5/2019 - 5/29/2020 RESOLVED: Dyspnea ICD-10-CM: R06.00 
ICD-9-CM: 786.09  11/13/2018 - 5/29/2020 RESOLVED: ARF (acute renal failure) ([facility]) ICD-10-CM: N17.9 ICD-9-CM: 584.9  11/13/2018 - 5/29/2020 RESOLVED: Closed wedge compression fracture of T7 vertebra ([facility] ICD-10-CM: T43.752S ICD-9-CM: 805.2  11/13/2018 - 5/29/2020 RESOLVED: Type 2 diabetes with nephropathy ([facility] ICD-10-CM: E11.21 
ICD-9-CM: 250.40, 583.81  10/1/2018 - 11/2/2020 RESOLVED: Chest pain ICD-10-CM: R07.9 ICD-9-CM: 786.50  6/27/2018 - 5/29/2020 RESOLVED: Abdominal pain ICD-10-CM: R10.9 ICD-9-CM: 789.00  6/27/2018 - 5/29/2020 RESOLVED: Coronary artery disease involving native coronary artery without angina pectoris (Chronic) ICD-10-CM: I25.10 ICD-9-CM: 414.01  1/22/2016 - 11/2/2020 RESOLVED: HTN (hypertension) ICD-10-CM: I10 
ICD-9-CM: 401.9  10/9/2015 - 1/22/2016 RESOLVED: NSTEMI (non-ST elevated myocardial infarction) ([facility] ICD-10-CM: I21.4 ICD-9-CM: 410.70  10/9/2015 - 5/29/2020 Subjective: 70 y.o. female with past medical history of chronic hypoxic respiratory failure (on 3 liters home oxygen), asthma, atrial fibrillation (Afib), autoimmune disease, fibromyalgia, CAD, heart failure (HFpEF), T7 vertebral compression fracture, COPD, type 2 DM, DVT, GERD, hypertension, NSTEMI, PAD, sleep apnea, obesity presented to the ED from home with chief complaints of shortness of breath (SOB) and low hemoglobin. Patient has chronic SOB but worsened. She was last hospitalized here from 1/1/2021 - 1/15/2021 with left lower lobe pneumonia, acute on chronic hypoxic respiratory failure, sepsis, GI bleed, anemia. Patient was transfused with 2 units PRBCs with hemoglobin = 4.9 on 1/1/2021. Last Hgb= 6.5 on 1/21/2021. On arrival in the ED today, initial recorded vital signs were BP = 139/47, HR= 82, RR= 22, O2sat= 94% on 4 liters O2 nasal cannula (NC). WBC = 18,400. proBNP = 1,443. Chest xray portable showed cardiomegaly, interstitial edema, and density in left lung base. Patient is now seen for admission to the hospitalist service for continued evaluation and treatments. There were no reports of new onset syncope, loss of consciousness, headache, neck pain, visual disturbance, numbness, paresthesias, focal weakness, chest pain, palpitations, abdominal pain, nausea, vomiting, diarrhea, melena, dysuria, hematuria, calf pain, increased leg swelling/ edema, fever, chills, rash, or known COVID 19 exposure. (Dr Viki Morton) 1/27:  Feeling better with less shortness of breath. Hb low so needs another transfusion. No clear source of bleeding. Covid negative. GI to see. 1/28:  Feels some better except sore throat - looks like thrush. Less shortness of breath. Hb up to 7.9 this AM after 1 unit PRC. K+ sl low - replacing. :  Little change from yesterday. BP continues to be on the low side and pt reports feeling lightheaded with sitting up - Cards adjusting meds. Hb down to 7.3 - watching - will likely need another transfusion. WBC 19K - likely due to steroids - weaning. :  Reports feeling better and no longer lightheaded. Cards has adjusted meds  Afeb. Will do 1-2 more days of IV antibiotics. :  No new complaints this AM.  Cards has signed off. BP remains better. Hb at 7.0 - given her hx will transfuse today. Risks v benefits discussed. :  No new complaints. She reports she feels a little stronger. AM labs pending. :  Currently no complaints. Desats with even smallest amt of exercise and O2 placed back on 5 liters NC. Pul continues to see. Cards seeing again. Hb 8.1 yesterday - AM Hb pending. 2/3:  Resting comfortably in bed. No complaints. O2 down to 4 liters. CXR yesterday continued to show Pul Edema - more diuresis needed. :  No complaints. Feels less short of breath as long as she does not \"do too much. \"  Lasix IV given by Cards - pt reports good diuresis but I/O shows little out. Still on 4 liters - try to wean down to her usual 3 liters O2. AM labs pending. Review of Systems: A comprehensive review of systems was negative except for that written in the HPI. Objective:  
Physical Exam:  
Visit Vitals BP (!) 152/70 (BP 1 Location: Right upper arm, BP Patient Position: At rest) Pulse 85 Temp 97.5 °F (36.4 °C) Resp 18 Ht 5' 7\" (1.702 m) Wt 109.9 kg (242 lb 3.2 oz) SpO2 99% BMI 37.93 kg/m² O2 Flow Rate (L/min): 4 l/min O2 Device: Nasal cannula Temp (24hrs), Av.9 °F (36.6 °C), Min:97.5 °F (36.4 °C), Max:98.2 °F (36.8 °C) 
   1901 -  0700 In: 0 Out: 1100 [Urine:1100]   701 -  1900 In: 071 [P.O.:840; I.V.:3] 
Out: 700 [Urine:700] General:  Alert, obese, cooperative, no distress, appears stated age. Head:  Normocephalic, without obvious abnormality, atraumatic. Eyes:  Conjunctivae/corneas clear. EOMs intact. Throat: Lips, mucosa, and tongue moist; throat red with a few white patches on tongue. Neck: Supple, symmetrical, trachea midline, no adenopathy, thyroid: no enlargement/tenderness/nodules, no carotid bruit and no JVD. Lungs:   Clear to auscultation bilaterally. Chest wall:  No tenderness or deformity. Heart:  irreg with rate in 39L, no murmur, click, rub or gallop. Abdomen:   Soft, non-tender. Bowel sounds normal. No masses,  No organomegaly. Extremities: no cyanosis. 1+ LE edema on top of chronic stasis changes. No calf tenderness or cords. Pulses: 2+ and symmetric all extremities. Skin:  turgor normal. No rashes Neurologic:   Alert and oriented X 3.  equal.  No cogwheeling or rigidity. Gait not tested at this time. Sensation grossly normal to touch. Gross motor of extremities normal except generalized weakness. Data Review:  
ECHO 1/29/21 Interpretation Summary Result status: Final result · Pericardium: There is a moderate left pleural effusion. · LV: Estimated LVEF is 60 - 65%. Normal cavity size, wall thickness and systolic function (ejection fraction normal). · TV: Mild tricuspid valve regurgitation is present. Portable chest single view dated 2/2/2021 Comparison chest dated 1/30/2021 History is increasing O2 needs A single portable AP upright view of the chest was obtained. The cardiac 
silhouette continues to be enlarged. There continues be opacification of the 
left lung base and a few air bronchograms are noted in the left hilar/parahilar 
region. This is suggestive of developing atelectasis/infiltration. There is some 
atelectatic change in the mid/upper portion of the left lung. Some hazy density 
is associated with the right lung. This is suggestive of developing infiltration. There is blunting of both costophrenic angles. This is compatible 
with the presence of bilateral pleural effusions. IMPRESSION 1. Continued evidence of mild pulmonary edema. 2. Presence of hazy density involving both lungs suggestive of developing 
infiltration/atelectatic change. 3. Presence of bilateral pleural effusions Recent Days: 
Recent Labs 02/03/21 
3965 02/01/21 
7978 WBC 14.0* 16.1* HGB 8.0* 8.1* HCT 27.5* 27.9*  
 410* Recent Labs 02/03/21 
4847 02/02/21 
5313 02/01/21 
7611  138 140  
K 3.9 4.0 4.0  
CL 97 98 99 CO2 37* 38* 39* * 110* 125* BUN 23* 23* 26* CREA 1.18* 1.19* 1.22* CA 8.1* 8.2* 7.9* ALB 2.1* 2.2* 2.2* TBILI 0.4 0.4 0.4 ALT 20 22 22 No results for input(s): PH, PCO2, PO2, HCO3, FIO2 in the last 72 hours. 24 Hour Results: 
Recent Results (from the past 24 hour(s)) GLUCOSE, POC Collection Time: 02/03/21  7:01 AM  
Result Value Ref Range Glucose (POC) 130 (H) 65 - 100 mg/dL Performed by Gary Sadler (PCT) GLUCOSE, POC Collection Time: 02/03/21 11:16 AM  
Result Value Ref Range Glucose (POC) 190 (H) 65 - 100 mg/dL Performed by Glory Hanson GLUCOSE, POC Collection Time: 02/03/21  4:21 PM  
Result Value Ref Range Glucose (POC) 338 (H) 65 - 100 mg/dL Performed by Maco Resendiz (PCT) GLUCOSE, POC Collection Time: 02/03/21  8:44 PM  
Result Value Ref Range Glucose (POC) 251 (H) 65 - 100 mg/dL Performed by Amadou Amador (PCT) Medications reviewed Current Facility-Administered Medications Medication Dose Route Frequency  dilTIAZem ER (CARDIZEM CD) capsule 300 mg  300 mg Oral DAILY  albuterol-ipratropium (DUO-NEB) 2.5 MG-0.5 MG/3 ML  3 mL Nebulization Q6HWA RT  
 albuterol-ipratropium (DUO-NEB) 2.5 MG-0.5 MG/3 ML  3 mL Nebulization Q4H PRN  
 furosemide (LASIX) injection 40 mg  40 mg IntraVENous BID  
  0.9% sodium chloride infusion 250 mL  250 mL IntraVENous PRN  predniSONE (DELTASONE) tablet 20 mg  20 mg Oral DAILY WITH BREAKFAST  nystatin (MYCOSTATIN) 100,000 unit/mL oral suspension 500,000 Units  500,000 Units Oral QID  potassium chloride SR (KLOR-CON 10) tablet 20 mEq  20 mEq Oral BID  
 amiodarone (CORDARONE) tablet 200 mg  200 mg Oral DAILY  glucose chewable tablet 16 g  4 Tab Oral PRN  
 dextrose (D50W) injection syrg 12.5-25 g  25-50 mL IntraVENous PRN  
 glucagon (GLUCAGEN) injection 1 mg  1 mg IntraMUSCular PRN  
 insulin lispro (HUMALOG) injection   SubCUTAneous AC&HS  
 0.9% sodium chloride infusion 250 mL  250 mL IntraVENous PRN  
 arformoteroL (BROVANA) neb solution 15 mcg  15 mcg Nebulization BID RT  
 ondansetron (ZOFRAN) injection 4 mg  4 mg IntraVENous Q4H PRN  
 lipase-protease-amylase (CREON 24,000) capsule 2 Cap  2 Cap Oral TID WITH MEALS  
 albuterol (PROVENTIL HFA, VENTOLIN HFA, PROAIR HFA) inhaler 2 Puff  2 Puff Inhalation Q4H PRN  
 apixaban (ELIQUIS) tablet 5 mg  5 mg Oral BID  cholecalciferol (VITAMIN D3) (1000 Units /25 mcg) tablet 2 Tab  2,000 Units Oral DAILY  cyanocobalamin (VITAMIN B12) tablet 1,000 mcg  1,000 mcg Oral DAILY  DULoxetine (CYMBALTA) capsule 60 mg  60 mg Oral DAILY  ferrous sulfate tablet 325 mg  325 mg Oral DAILY WITH BREAKFAST  mirtazapine (REMERON) tablet 15 mg  15 mg Oral QHS  montelukast (SINGULAIR) tablet 10 mg  10 mg Oral QPM  
 pantoprazole (PROTONIX) tablet 40 mg  40 mg Oral ACB  sodium chloride (NS) flush 5-40 mL  5-40 mL IntraVENous Q8H  
 sodium chloride (NS) flush 5-40 mL  5-40 mL IntraVENous PRN  
 acetaminophen (TYLENOL) tablet 650 mg  650 mg Oral Q6H PRN Or  
 acetaminophen (TYLENOL) suppository 650 mg  650 mg Rectal Q6H PRN  polyethylene glycol (MIRALAX) packet 17 g  17 g Oral DAILY PRN  
 guaiFENesin-dextromethorphan (ROBITUSSIN DM) 100-10 mg/5 mL syrup 5 mL  5 mL Oral Q4H PRN  
 
 Care Plan discussed with: Patient/GI/Cardiology/Pul and Nurse Total time spent with patient: 30 minutes.  
Micah Garay MD

## 2021-02-04 NOTE — PROGRESS NOTES
Bedside and Verbal shift change report given to Melvin Loza (oncoming nurse) by Juan Fonseca (offgoing nurse). Report included the following information SBAR, Kardex, Intake/Output, MAR, Recent Results and Cardiac Rhythm Afib.

## 2021-02-04 NOTE — PROGRESS NOTES
Bedside and Verbal shift change report given to Marielle San (oncoming nurse) by Starr BAZZI (offgoing nurse). Report included the following information SBAR, Kardex, Intake/Output, MAR and Recent Results.

## 2021-02-04 NOTE — DIABETES MGMT
[address] [facility] 
 
FOLLOW-UP NOTE Presentation Audrey Ruiz is a 70 y.o. female admitted 1/26/21 with shortness of breath.  
  
HX:  
Past Medical History (click to expand or collapse) Past Medical History:  
Diagnosis Date  Asthma    
 Atrial fibrillation ([facility] 11/16/2018  Autoimmune disease ([facility]    
  fibromyalgia  CAD (coronary artery disease) 10/9/2015  Closed wedge compression fracture of T7 vertebra ([facility] 11/13/2018  COPD (chronic obstructive pulmonary disease) ([facility])    
 Diabetes ([facility]    
 DVT (deep vein thrombosis) in pregnancy    
 GERD (gastroesophageal reflux disease)    
 Heart failure ([facility])    
 History of vascular access device 09/30/2020  
  4f midline, 12cm at 1cm out placed in L Cephalic by Kehinde James RN  
 HTN (hypertension)    
 NSTEMI (non-ST elevated myocardial infarction) ([facility] 10/9/2015  Obesity (BMI 30-39.9) 11/13/2018  PAD (peripheral artery disease) ([facility])    
  prior stenting  Sleep apnea    
  wears CPAP  
 Statin intolerance    
  
  
Current clinical course has been uncomplicated.  
  
Diabetes: Patient has known Type 2 diabetes, diagnosed in 2007. Home diabetes medication regimen is Onglyza 5mg daily. Admission  and A1c 5.5% indicate good diabetes control. Subjective Patient resting in bed, alert and oriented. Patient without complaints at this time. Objective Physical exam 
General Alert, oriented and in no acute distress. Conversant and cooperative. Vital Signs Visit Vitals BP (!) 118/56 (BP 1 Location: Left upper arm, BP Patient Position: At rest) Pulse 89 Temp 97.6 °F (36.4 °C) Resp 18 Ht 5' 7\" (1.702 m) Wt 110 kg (242 lb 8.1 oz) SpO2 96% BMI 37.98 kg/m² Skin  Warm and dry Heart   Regular rate and rhythm. No murmurs, rubs or gallops Lungs  Clear to auscultation without rales or rhonchi Extremities No foot wounds Laboratory Lab Results Component Value Date/Time Hemoglobin A1c 5.5 01/27/2021 01:58 AM  
 
Lab Results Component Value Date/Time LDL, calculated 68.8 10/10/2015 05:00 AM  
 
Lab Results Component Value Date/Time Creatinine (POC) 1.1 08/01/2019 11:41 AM  
 Creatinine 1.33 (H) 02/04/2021 06:37 AM  
 
Lab Results Component Value Date/Time Sodium 140 02/04/2021 06:37 AM  
 Potassium 3.7 02/04/2021 06:37 AM  
 Chloride 95 (L) 02/04/2021 06:37 AM  
 CO2 41 (HH) 02/04/2021 06:37 AM  
 Anion gap 4 (L) 02/04/2021 06:37 AM  
 Glucose 145 (H) 02/04/2021 06:37 AM  
 BUN 25 (H) 02/04/2021 06:37 AM  
 Creatinine 1.33 (H) 02/04/2021 06:37 AM  
 BUN/Creatinine ratio 19 02/04/2021 06:37 AM  
 GFR est AA 47 (L) 02/04/2021 06:37 AM  
 GFR est non-AA 39 (L) 02/04/2021 06:37 AM  
 Calcium 8.0 (L) 02/04/2021 06:37 AM  
 Bilirubin, total 0.3 02/04/2021 06:37 AM  
 Alk. phosphatase 232 (H) 02/04/2021 06:37 AM  
 Protein, total 5.5 (L) 02/04/2021 06:37 AM  
 Albumin 2.2 (L) 02/04/2021 06:37 AM  
 Globulin 3.3 02/04/2021 06:37 AM  
 A-G Ratio 0.7 (L) 02/04/2021 06:37 AM  
 ALT (SGPT) 22 02/04/2021 06:37 AM  
 
Lab Results Component Value Date/Time ALT (SGPT) 22 02/04/2021 06:37 AM  
 
 
 
Factors affecting BG pattern Factor Dose Comments Nutrition: 
Carb-controlled meals   
60 grams/meal    
Drugs: 
Vasopressor load Steroids HIV Epogen Blood transfusion(s) Other: n/a   
n/a Prednison 20 mg daily 
n/a 
  
  
  
A1cs inaccurate A1cs inaccurate Pain 0/10    
Infection Zithromax, Rocephin PNA Other: n/a n/a    
 
 
Blood glucose pattern Evaluation This 70year old female, with Type 2 diabetes, did achieve diabetes control prior to admission (PTA), as evidenced by admission BG of 262 and A1c of 5.5%. Based on assessment of diabetes self-care practices, interventions that warrant action while hospitalized include: 
            [x]? Healthy Eating [x]?        Being Active [x]? Monitoring                                
  
  
The patient would also benefit from diabetes self-management education and support JAIMEE IQBAL [facility]) after discharge. 
  
During this hospitalization, the patient has achieved inpatient blood glucose target of 100-180mg/dl. BG's have ranged 145-338 over the past 24 hours. Factors that have played a role include: 
[x]? Kidney dysfunction 
[x]? Glucocorticoid Use  
  
Basal insulin isn't in use.  
  
Bolus insulin isn't in use. Patient is eating meals. 
  
Corrective insulin is in use. Patient has required 6 units corrective insulin over the past 24 hours.   
  
To optimize BG control and support a positive health outcome, would utilize the Subcutaneous Insulin Order set (3068).   
Impression: As suspected, corrective insulin alone has not been sufficient to consistently maintain patient's BG at target. Patient home regimen is Onglyza daily. BG's have have consistently been trending outside of target range, spiking to 338 at 1621 yesterday. Given patient's current BG trends, it is likely that low dose basal insulin will be required to obtain BG control in target range. It is also likely that adding bolus insulin will decrease the post prandial BG spikes. Assessment and Plan Nursing Diagnosis Risk for unstable blood glucose pattern Nursing Intervention Domain 3717 Decision-making Support Nursing Interventions Examined current inpatient diabetes control Explored factors facilitating and impeding inpatient management Recommendations Recommend: 
  
[x]? Use of Subcutaneous Insulin Order set (2153) Insulin Use Recommendation Basal                                      (Based on weight, BMI & GFR) Addresses basic metabolic needs Start Lantus 20 units daily Nutritional                                      (Based on CHO load) Addresses nutrition interventions Start Humalog 6 units with meals. Corrective                                       (Offset gaps in dosing) Useful in adjusting insulin dosing Correctional Scale for Normal Sensitivity 
  
200-249- 2units Humalog [phone] Humalog [phone] Humalog [phone] Humalog Over 400- 10units Humalog 
  
Do NOT hold for NPO; give in addition to meal time insulin dose. 
  
If patient does not eat, -give correction dose only.  
  
  
[x]? Referral to 
  
[x]? Diabetes Self-Management Training through [facility] (Phone [phone] to schedule appointment) Time Spent/ Billing Codes Total time spent with patient: 15 Minutes   I personally reviewed chart, notes, data and current medications in the medical record. I have personally examined and treated the patient at bedside during this period. [x] 80588 IP subsequent hospital care - 15 minutes MIKAYLA Lang Diabetes Clinical Nurse Specialist 
[facility] Access via [employer]

## 2021-02-04 NOTE — WOUND CARE
Follow up visit for wounds Patient awakeand alert sitting up in [facility] bed. All foam dressings removed. Right elbow wound closed , no foam needed. Open to air Right lower upper extremity , wound closed . Open to air LLExtremity wound closed dry. Open to air Right lower upper extremity wound closed, dry . Open to air. lower and upper extremities skin dry . moisturizer applied. Heels bilaterally blanchable redness on outer aspect of heel. Heels elevated on pillows. Buttocks and sacrum blanchable red.moist skin Skin intact. Zinc applied Recommendation: No foam dressing needed. All areas open to air. elevate heels on pillows or heel boots per patient preference. Zinc to bottom for moistness or incontinence care. Reposition:  Continue to turn every 1-2hrs to reposition for pressure relief, with pillows to protect bony prominences/float heels Continue every 2hr continence checks; keep clean and dry and apply Zinc paste as appropriate for prevention of incontinence dermatitis; Re-consult as needed

## 2021-02-05 NOTE — PROGRESS NOTES
Cardiology Progress Note    5th floor  
NAME:  Sherry Jalloh  
:   1945  
MRN:   [id number]  
 
Assessment/Plan:  
1. PAF: remains in a fib, with occ spike to 120 lasts less than 10 seconds. On eliquis.  
2. Chronic resp failure: on 3 liters oxygen baseline.  
3. HF p EF: cont po lasix. Ct stablel 1.25 
4. Anemia: hgb 7.4 would transfuse to keep Hgb > 8.  
 
 
Will see prn/pls call if needed.  
Follow-up Information   
 Follow up With Specialties Details Why Contact Info  
 Reid Foster MD Cardiology On 2021 10 am  [address] [facility] 
[address] 
[facility] [id number] [phone] 
  
  
 
 
  
 
 
 
Subjective:  
Sherry Jalloh is a 75 y.o. female with past medical history of COPD on 3 liters nasal canula oxygen OP,  paroxysmal atrial fibrillation,  autoimmune disease, fibromyalgia, CAD, HFpEF, obesity presented to the ED from home with chief complaints of shortness of breath (SOB) and low hemoglobin.   
Patient has chronic SOB but worsened.   
She was last hospitalized here from 2021 - 1/15/2021 with left lower lobe pneumonia, acute on chronic hypoxic respiratory failure, sepsis, GI bleed, anemia.   
 
 
 
 
Cardiac ROS: No chest pain: Breathing at baseline.  
 
Previous Cardiac Eval 
20  
ECHO ADULT COMPLETE 2020  
 Narrative · LV: Estimated LVEF is 60 - 65%. Visually measured ejection fraction.  
Normal cavity size and systolic function (ejection fraction normal). Mild  
concentric hypertrophy. Wall motion: normal. 
· AV: With no evidence of reduced excursion. 
   
  Signed by: Zandra Meneses MD  
 
XR Results (most recent): 
Results from Hospital Encounter encounter on 21  
XR CHEST PORT  
 Narrative Clinical history: CHF, effusions 
INDICATION:   CHF, effusions 
COMPARISON: 2021 
 
FINDINGS: 
AP portable upright view of the chest demonstrates a stable  cardiopericardial 
 silhouette. Intimal left basilar atelectasis and effusion. .Status post 
kyphoplasty. There is no pneumothorax. . Patient is on a cardiac monitor. Impression Minimal left basilar atelectasis and effusion. Cardiomegaly. Objective:  
 
Visit Vitals BP (!) 140/66 (BP 1 Location: Left lower arm, BP Patient Position: At rest) Pulse 79 Temp 98.1 °F (36.7 °C) Resp 18 Ht 5' 7\" (1.702 m) Wt 111 kg (244 lb 11.4 oz) SpO2 90% BMI 38.33 kg/m² O2 Flow Rate (L/min): 3 l/min O2 Device: Nasal cannula Temp (24hrs), Av °F (36.7 °C), Min:97.6 °F (36.4 °C), Max:98.4 °F (36.9 °C) No intake/output data recorded.  1901 -  0700 In: 900 [P.O.:870; I.V.:30] Out: 3750 [[id number]] TELE: a fib General: A&O X3, obese,  NAD HEENT: Atraumatic. Pink and moist.  Anicteric sclerae. Neck : Supple Lungs:  Diminished throughout Heart: irregular rhythm 2/6 systolic murmur Abdomen: obese, Soft, non-distended, non-tender. + Bowel sounds. Extremities: generalized edema. Neurologic: Grossly intact. No acute neurological distress. Psych: fair insight. Not anxious or agitated. Care Plan discussed with: 
  Comments Patient x Family RN x Care Manager Consultant:     
 
 
Data Review: No lab exists for component: ITNL No results for input(s): CPK, CKMB, TROIQ in the last 72 hours. Recent Labs 21 
2967 21 
2987 21 
8957  140 139  
K 3.5 3.7 3.9 CL 94* 95* 97  
CO2 41* 41* 37* BUN 26* 25* 23* CREA 1.25* 1.33* 1.18* * 145* 111* MG  --  2.4  --   
ALB 2.1* 2.2* 2.1* WBC 13.4* 13.2* 14.0* HGB 7.4* 8.0* 8.0*  
HCT 25.7* 27.4* 27.5*  
 416* 389 No results for input(s): INR, PTP, APTT, INREXT, INREXT in the last 72 hours. Medications reviewed Current Facility-Administered Medications Medication Dose Route Frequency  furosemide (LASIX) tablet 40 mg  40 mg Oral DAILY  dilTIAZem ER (CARDIZEM CD) capsule 300 mg  300 mg Oral DAILY  albuterol-ipratropium (DUO-NEB) 2.5 MG-0.5 MG/3 ML  3 mL Nebulization Q6HWA RT  
 albuterol-ipratropium (DUO-NEB) 2.5 MG-0.5 MG/3 ML  3 mL Nebulization Q4H PRN  
 0.9% sodium chloride infusion 250 mL  250 mL IntraVENous PRN  predniSONE (DELTASONE) tablet 20 mg  20 mg Oral DAILY WITH BREAKFAST  nystatin (MYCOSTATIN) 100,000 unit/mL oral suspension 500,000 Units  500,000 Units Oral QID  potassium chloride SR (KLOR-CON 10) tablet 20 mEq  20 mEq Oral BID  
 amiodarone (CORDARONE) tablet 200 mg  200 mg Oral DAILY  glucose chewable tablet 16 g  4 Tab Oral PRN  
 dextrose (D50W) injection syrg 12.5-25 g  25-50 mL IntraVENous PRN  
 glucagon (GLUCAGEN) injection 1 mg  1 mg IntraMUSCular PRN  
 insulin lispro (HUMALOG) injection   SubCUTAneous AC&HS  
 0.9% sodium chloride infusion 250 mL  250 mL IntraVENous PRN  
 arformoteroL (BROVANA) neb solution 15 mcg  15 mcg Nebulization BID RT  
 ondansetron (ZOFRAN) injection 4 mg  4 mg IntraVENous Q4H PRN  
 lipase-protease-amylase (CREON 24,000) capsule 2 Cap  2 Cap Oral TID WITH MEALS  
 apixaban (ELIQUIS) tablet 5 mg  5 mg Oral BID  cholecalciferol (VITAMIN D3) (1000 Units /25 mcg) tablet 2 Tab  2,000 Units Oral DAILY  cyanocobalamin (VITAMIN B12) tablet 1,000 mcg  1,000 mcg Oral DAILY  DULoxetine (CYMBALTA) capsule 60 mg  60 mg Oral DAILY  ferrous sulfate tablet 325 mg  325 mg Oral DAILY WITH BREAKFAST  mirtazapine (REMERON) tablet 15 mg  15 mg Oral QHS  montelukast (SINGULAIR) tablet 10 mg  10 mg Oral QPM  
 pantoprazole (PROTONIX) tablet 40 mg  40 mg Oral ACB  sodium chloride (NS) flush 5-40 mL  5-40 mL IntraVENous Q8H  
 sodium chloride (NS) flush 5-40 mL  5-40 mL IntraVENous PRN  
 acetaminophen (TYLENOL) tablet 650 mg  650 mg Oral Q6H PRN  Or  
 acetaminophen (TYLENOL) suppository 650 mg  650 mg Rectal Q6H PRN  
  polyethylene glycol (MIRALAX) packet 17 g  17 g Oral DAILY PRN  
 guaiFENesin-dextromethorphan (ROBITUSSIN DM) 100-10 mg/5 mL syrup 5 mL  5 mL Oral Q4H PRN [place] T[facility], NP

## 2021-02-05 NOTE — PROGRESS NOTES
Spiritual Care Assessment/Progress Note [address] 
 
 
NAME: Angelica Guillen      MRN: [id number] AGE: 70 y.o. SEX: female Taoist Affiliation: Episcopalian Language: [place] 2/5/2021     Total Time (in minutes): 15 Spiritual Assessment begun in [facility]  MED SURG 2 through conversation with: 
  
    [x]Patient        [] Family    [] Friend(s) Reason for Consult: Initial/Spiritual assessment, patient floor Spiritual beliefs: (Please include comment if needed) [x] Identifies with a leanna tradition:   Demetrius Houston  
   [] Supported by a leanna community:        
   [] Claims no spiritual orientation:       
   [] Seeking spiritual identity:            
   [] Adheres to an individual form of spirituality:       
   [] Not able to assess:                   
 
    
Identified resources for coping:  
   [] Prayer                           
   [] Music                  [] Guided Imagery [x] Family/friends                 [] Pet visits [] Devotional reading                         [] Unknown 
   [] Other:                                      
 
 
Interventions offered during this visit: (See comments for more details) Patient Interventions: Affirmation of emotions/emotional suffering, Affirmation of leanna, Catharsis/review of pertinent events in supportive environment, Coping skills reviewed/reinforced, Iconic (affirming the presence of God/Higher Power) Plan of Care: 
 
 [] Support spiritual and/or cultural needs  
 [] Support AMD and/or advance care planning process    
 [] Support grieving process 
 [] Coordinate Rites and/or Rituals  
 [] Coordination with community clergy [] No spiritual needs identified at this time 
 [] Detailed Plan of Care below (See Comments)  [] Make referral to Music Therapy 
[] Make referral to Pet Therapy    
[] Make referral to Addiction services 
[] Make referral to [facility] 
[] Make referral to Spiritual Care Partner [] No future visits requested       
[x] Follow up upon further referrals Comments:  visit for initial spiritual assessment. Patient resting in bed, asleep. Did not awaken to knock at door or verbal greeting, but telephone rang and awakened patient. Provided spiritual presence and listening as she spoke of her present thoughts, feelings, and concerns. Says she is feeling better. Spoke briefly of her health. Says she spoke to her doctor pernell and is being discharged back to [facility] as soon as able. Says she is glad to be returning home with a little rehab. Expressed gratitude for the care she has been receiving. Said she is comfortable and not in need of anything at this time when asked how spiritual care can best provide for her needs. Informed her of availability of  and pastoral care staff. She appeared comforted as a result fo this visit and expressed gratitude for [facility] visit. Rev. Kaleb Tyson MDiv, [facility], [facility]  paging service: [phone])

## 2021-02-05 NOTE — PROGRESS NOTES
Daily Progress Note: 2/5/2021 Gisueppeclif BethOchiltree London Herrera MD 
 
Assessment/Plan: 1. Pneumonia -  IV antibiotics Azithromycin 500 mg IV and Ceftriaxone 1 gram IV completed 2. Shortness of breath (SOB) - with history of chronic hypoxic respiratory failure on 3 liters chronically - provide supplemental oxygen 
  
3. Acute on chronic diastolic CHF (HFpEF) - diuresis per Cardiology 
     - monitor fluid status closely 
     - ECHO results as under Data Review - little changed cardiac function 
     - cardiology consulted 
  
4. Negative for COVID 19 virus infection 
     - COVID 19 test is negative  
  
5.  Leukocytosis 
     - monitor, antibiotics as above 
  
6. Anemia 
     - monitor and transfuse for hemoglobin < 7.0. 
  
7. Uncontrolled type 2 DM with hyperglycemia 
     - SSI inpt 
     - resume outpt tx when discharged 
  
8. Acute superimposed on chronic kidney disease stage 3:  improved 
     - monitor and tx as needed 9. Elevated alkaline phosphatase 
     - monitor and treat as needed 
  
10. Hypertension 
       - hold on home Lisinopril - HCTZ with elevated creatinine and soft BPs - restart if needed. - On Amlodipine per cards - Dilt/Lasix per Cards 
  
11. History of GI bleed - s/p upper GI endoscopy and colonoscopy on 1/4/2021 with Adenomatous polyp of transverse colon [D12.3] Candida esophagitis ([facility] [address] [B37.81] Gastritis and duodenitis [  
  - GI consulted 12. Obesity 
       - recommended weight loss, heart healthy diet, and lifestyle modification 13. GERD 
       - Protonix 
  
14. Paroxysmal atrial fibrillation (afib) 
       - on long term anticoagulation on Eliquis - hold if further bleeding 
       - cleared to restart Eliquis tx per GI/Cardiology 
  
CODE STATUS:  FULL CODE as discussed with patient who requests the same. Problem List: 
Problem List as of 2/5/2021 Date Reviewed: 11/2/2020 Codes Class Noted - Resolved Pneumonia ICD-10-CM: J18.9 ICD-9-CM: 811  1/26/2021 - Present SOB (shortness of breath) ICD-10-CM: R06.02 
ICD-9-CM: 786.05  1/26/2021 - Present Anemia ICD-10-CM: D64.9 ICD-9-CM: 285.9  1/26/2021 - Present Acute on chronic systolic CHF (congestive heart failure) ([facility]) ICD-10-CM: N93.89 ICD-9-CM: 428.23, 428.0  1/26/2021 - Present Uncontrolled type 2 diabetes mellitus with hyperglycemia ([facility]) ICD-10-CM: E11.65 ICD-9-CM: 250.02  1/26/2021 - Present Left lower lobe pneumonia ICD-10-CM: J18.9 ICD-9-CM: 524  1/1/2021 - Present Leukocytosis ICD-10-CM: A83.154 ICD-9-CM: 288.60  11/3/2020 - Present Chronic respiratory failure with hypoxia [facility]) ICD-10-CM: J96.11 
ICD-9-CM: 518.83, 799.02  11/2/2020 - Present Overview Signed 11/2/2020 10:12 PM by Marie Clark MD  
  On 3L NC at home Cellulitis of lower extremity ICD-10-CM: L03.119 ICD-9-CM: 682.6  3/23/2020 - Present ASO (arteriosclerosis obliterans) ICD-10-CM: I70.90 ICD-9-CM: 440.9  9/5/2019 - Present PVD (peripheral vascular disease) ([facility] [address] ICD-10-CM: I73.9 ICD-9-CM: 443.9  8/1/2019 - Present Severe obesity ([facility] [address] ICD-10-CM: E66.01 
ICD-9-CM: 278.01  7/30/2019 - Present COPD (chronic obstructive pulmonary disease) ([facility]) ICD-10-CM: J44.9 ICD-9-CM: 313  7/13/2019 - Present Normocytic anemia ICD-10-CM: D64.9 ICD-9-CM: 285.9  7/13/2019 - Present PAD (peripheral artery disease) (HCC) ICD-10-CM: I73.9 ICD-9-CM: 443.9  7/13/2019 - Present Mild intermittent asthma ICD-10-CM: J45.20 ICD-9-CM: 493.90  5/17/2019 - Present Brachial artery thrombus (HCC) ICD-10-CM: K72.4 ICD-9-CM: 444.21  4/26/2019 - Present Sleep apnea ICD-10-CM: G47.30 ICD-9-CM: 780.57  1/5/2019 - Present  Overview Signed 1/5/2019  8:41 PM by Ashley Allen DO  
  wears CPAP 
  
  
   
 Atrial fibrillation [facility]) ICD-10-CM: I48.91 
ICD-9-CM: 427.31  11/16/2018 - Present Obesity (BMI 30-39.9) (Chronic) ICD-10-CM: Z18.7 ICD-9-CM: 278.00  11/13/2018 - Present Recurrent deep vein thrombosis (DVT) (HCC) ICD-10-CM: I82.409 ICD-9-CM: 453.40  3/30/2018 - Present Chronic anticoagulation (Chronic) ICD-10-CM: Z79.01 
ICD-9-CM: V58.61  3/30/2018 - Present Essential hypertension (Chronic) ICD-10-CM: I10 
ICD-9-CM: 401.9  1/22/2016 - Present CAD (coronary artery disease) ICD-10-CM: I25.10 ICD-9-CM: 414.00  10/9/2015 - Present Type II diabetes mellitus (HCC) (Chronic) ICD-10-CM: E11.9 ICD-9-CM: 250.00  10/9/2015 - Present RESOLVED: Syncope and collapse ICD-10-CM: R55 
ICD-9-CM: 780.2  9/29/2020 - 11/2/2020 RESOLVED: Cellulitis ICD-10-CM: L03.90 ICD-9-CM: 682.9  3/23/2020 - 5/29/2020 RESOLVED: Hypokalemia ICD-10-CM: E87.6 ICD-9-CM: 276.8  3/23/2020 - 5/29/2020 RESOLVED: Hyponatremia ICD-10-CM: E87.1 ICD-9-CM: 276.1  3/23/2020 - 5/29/2020 RESOLVED: Diarrhea ICD-10-CM: R19.7 ICD-9-CM: 787.91  3/23/2020 - 5/29/2020 RESOLVED: Syncope and collapse ICD-10-CM: R55 
ICD-9-CM: 780.2  7/13/2019 - 5/29/2020 RESOLVED: UTI (urinary tract infection) ICD-10-CM: N39.0 ICD-9-CM: 599.0  7/13/2019 - 11/2/2020 RESOLVED: Sepsis ([address] ICD-10-CM: A41.9 ICD-9-CM: 038.9, 995.91  7/13/2019 - 11/3/2020 RESOLVED: Leg wound, left ICD-10-CM: W55.704D ICD-9-CM: 891.0  7/13/2019 - 5/29/2020 RESOLVED: Leg laceration, right, initial encounter ICD-10-CM: I21.029D ICD-9-CM: 894.0  7/13/2019 - 5/29/2020 RESOLVED: Cellulitis of right upper arm ICD-10-CM: L03.113 ICD-9-CM: 682.3  5/17/2019 - 5/29/2020 RESOLVED: Gangrene of finger of right hand ([facility] [address] ICD-10-CM: S21 
ICD-9-CM: 785.4  5/17/2019 - 11/2/2020 RESOLVED: Bowel obstruction ([facility] [address] ICD-10-CM: D55.244 ICD-9-CM: 560.9  1/8/2019 - 5/29/2020 RESOLVED: Partial small bowel obstruction ([facility] ICD-10-CM: K56.600 ICD-9-CM: 560.9  1/5/2019 - 5/29/2020 RESOLVED: Dyspnea ICD-10-CM: R06.00 
ICD-9-CM: 786.09  11/13/2018 - 5/29/2020 RESOLVED: ARF (acute renal failure) (HCC) ICD-10-CM: N17.9 ICD-9-CM: 584.9  11/13/2018 - 5/29/2020 RESOLVED: Closed wedge compression fracture of T7 vertebra ([facility] ICD-10-CM: S50.635P ICD-9-CM: 805.2  11/13/2018 - 5/29/2020 RESOLVED: Type 2 diabetes with nephropathy ([facility] ICD-10-CM: E11.21 
ICD-9-CM: 250.40, 583.81  10/1/2018 - 11/2/2020 RESOLVED: Chest pain ICD-10-CM: R07.9 ICD-9-CM: 786.50  6/27/2018 - 5/29/2020 RESOLVED: Abdominal pain ICD-10-CM: R10.9 ICD-9-CM: 789.00  6/27/2018 - 5/29/2020 RESOLVED: Coronary artery disease involving native coronary artery without angina pectoris (Chronic) ICD-10-CM: I25.10 ICD-9-CM: 414.01  1/22/2016 - 11/2/2020 RESOLVED: HTN (hypertension) ICD-10-CM: I10 
ICD-9-CM: 401.9  10/9/2015 - 1/22/2016 RESOLVED: NSTEMI (non-ST elevated myocardial infarction) ([facility] ICD-10-CM: I21.4 ICD-9-CM: 410.70  10/9/2015 - 5/29/2020 Subjective: 70 y.o. female with past medical history of chronic hypoxic respiratory failure (on 3 liters home oxygen), asthma, atrial fibrillation (Afib), autoimmune disease, fibromyalgia, CAD, heart failure (HFpEF), T7 vertebral compression fracture, COPD, type 2 DM, DVT, GERD, hypertension, NSTEMI, PAD, sleep apnea, obesity presented to the ED from home with chief complaints of shortness of breath (SOB) and low hemoglobin. Patient has chronic SOB but worsened. She was last hospitalized here from 1/1/2021 - 1/15/2021 with left lower lobe pneumonia, acute on chronic hypoxic respiratory failure, sepsis, GI bleed, anemia. Patient was transfused with 2 units PRBCs with hemoglobin = 4.9 on 1/1/2021. Last Hgb= 6.5 on 1/21/2021. On arrival in the ED today, initial recorded vital signs were BP = 139/47, HR= 82, RR= 22, O2sat= 94% on 4 liters O2 nasal cannula (NC). WBC = 18,400. proBNP = 1,443. Chest xray portable showed cardiomegaly, interstitial edema, and density in left lung base. Patient is now seen for admission to the hospitalist service for continued evaluation and treatments. There were no reports of new onset syncope, loss of consciousness, headache, neck pain, visual disturbance, numbness, paresthesias, focal weakness, chest pain, palpitations, abdominal pain, nausea, vomiting, diarrhea, melena, dysuria, hematuria, calf pain, increased leg swelling/ edema, fever, chills, rash, or known COVID 19 exposure. (Dr Patrick Hampton) 1/27:  Feeling better with less shortness of breath. Hb low so needs another transfusion. No clear source of bleeding. Covid negative. GI to see. 1/28:  Feels some better except sore throat - looks like thrush. Less shortness of breath. Hb up to 7.9 this AM after 1 unit PRC. K+ sl low - replacing. :  Little change from yesterday. BP continues to be on the low side and pt reports feeling lightheaded with sitting up - Cards adjusting meds. Hb down to 7.3 - watching - will likely need another transfusion. WBC 19K - likely due to steroids - weaning. :  Reports feeling better and no longer lightheaded. Cards has adjusted meds  Afeb. Will do 1-2 more days of IV antibiotics. :  No new complaints this AM.  Cards has signed off. BP remains better. Hb at 7.0 - given her hx will transfuse today. Risks v benefits discussed. :  No new complaints. She reports she feels a little stronger. AM labs pending. :  Currently no complaints. Desats with even smallest amt of exercise and O2 placed back on 5 liters NC. Pul continues to see. Cards seeing again. Hb 8.1 yesterday - AM Hb pending. 2/3:  Resting comfortably in bed. No complaints. O2 down to 4 liters. CXR yesterday continued to show Pul Edema - more diuresis needed. :  No complaints. Feels less short of breath as long as she does not \"do too much. \"  Lasix IV given by Cards - pt reports good diuresis but I/O shows little out. Still on 4 liters - try to wean down to her usual 3 liters O2. AM labs pending. :  No complaints. O2 down to 3 liters. Stable for SNF rehab when they can take her. Review of Systems: A comprehensive review of systems was negative except for that written in the HPI. Objective:  
Physical Exam:  
Visit Vitals BP (!) 115/51 (BP 1 Location: Right upper arm, BP Patient Position: At rest) Pulse 100 Temp 98 °F (36.7 °C) Resp 19 Ht 5' 7\" (1.702 m) Wt 110 kg (242 lb 8.1 oz) SpO2 96% BMI 37.98 kg/m² O2 Flow Rate (L/min): 3 l/min O2 Device: Nasal cannula Temp (24hrs), Av.9 °F (36.6 °C), Min:97.6 °F (36.4 °C), Max:98.4 °F (36.9 °C) 
  1901 - 700 In: 240 [P.O.:240] Out: 1000 [Urine:1000]   701 - 1900 In: 760 [P.O.:750; I.V.:10] Out: 2800 [Urine:2800] General:  Alert, obese, cooperative, no distress, appears stated age. Head:  Normocephalic, without obvious abnormality, atraumatic. Eyes:  Conjunctivae/corneas clear. EOMs intact. Throat: Lips, mucosa, and tongue moist; throat red with a few white patches on tongue. Neck: Supple, symmetrical, trachea midline, no adenopathy, thyroid: no enlargement/tenderness/nodules, no carotid bruit and no JVD. Lungs:   Clear to auscultation bilaterally. Chest wall:  No tenderness or deformity. Heart:  irreg with rate in 10A, no murmur, click, rub or gallop. Abdomen:   Soft, non-tender. Bowel sounds normal. No masses,  No organomegaly. Extremities: no cyanosis. 1+ LE edema on top of chronic stasis changes. No calf tenderness or cords. Pulses: 2+ and symmetric all extremities. Skin:  turgor normal. No rashes Neurologic:   Alert and oriented X 3.  equal.  No cogwheeling or rigidity. Gait not tested at this time. Sensation grossly normal to touch. Gross motor of extremities normal except generalized weakness. Data Review:  
ECHO 1/29/21 Interpretation Summary Result status: Final result · Pericardium: There is a moderate left pleural effusion. · LV: Estimated LVEF is 60 - 65%. Normal cavity size, wall thickness and systolic function (ejection fraction normal). · TV: Mild tricuspid valve regurgitation is present. Portable chest single view dated 2/2/2021 Comparison chest dated 1/30/2021 History is increasing O2 needs A single portable AP upright view of the chest was obtained. The cardiac 
silhouette continues to be enlarged. There continues be opacification of the 
left lung base and a few air bronchograms are noted in the left hilar/parahilar 
region. This is suggestive of developing atelectasis/infiltration. There is some 
atelectatic change in the mid/upper portion of the left lung. Some hazy density is associated with the right lung. This is suggestive of developing 
infiltration. There is blunting of both costophrenic angles. This is compatible 
with the presence of bilateral pleural effusions. IMPRESSION 1. Continued evidence of mild pulmonary edema. 2. Presence of hazy density involving both lungs suggestive of developing 
infiltration/atelectatic change. 3. Presence of bilateral pleural effusions Recent Days: 
Recent Labs 02/04/21 
1249 02/03/21 
0311 WBC 13.2* 14.0* HGB 8.0* 8.0*  
HCT 27.4* 27.5*  
* 389 Recent Labs 02/04/21 
9993 02/03/21 
1316 02/02/21 
1864  139 138  
K 3.7 3.9 4.0  
CL 95* 97 98 CO2 41* 37* 38* * 111* 110* BUN 25* 23* 23* CREA 1.33* 1.18* 1.19* CA 8.0* 8.1* 8.2* MG 2.4  --   --   
ALB 2.2* 2.1* 2.2* TBILI 0.3 0.4 0.4 ALT 22 20 22 No results for input(s): PH, PCO2, PO2, HCO3, FIO2 in the last 72 hours. 24 Hour Results: 
Recent Results (from the past 24 hour(s)) NT-PRO BNP Collection Time: 02/04/21  6:15 AM  
Result Value Ref Range NT pro-BNP 1,522 (H) <244 PG/ML  
METABOLIC PANEL, COMPREHENSIVE Collection Time: 02/04/21  6:37 AM  
Result Value Ref Range Sodium 140 136 - 145 mmol/L Potassium 3.7 3.5 - 5.1 mmol/L Chloride 95 (L) 97 - 108 mmol/L  
 CO2 41 (HH) 21 - 32 mmol/L Anion gap 4 (L) 5 - 15 mmol/L Glucose 145 (H) 65 - 100 mg/dL BUN 25 (H) 6 - 20 MG/DL Creatinine 1.33 (H) 0.55 - 1.02 MG/DL  
 BUN/Creatinine ratio 19 12 - 20 GFR est AA 47 (L) >60 ml/min/1.73m2 GFR est non-AA 39 (L) >60 ml/min/1.73m2 Calcium 8.0 (L) 8.5 - 10.1 MG/DL Bilirubin, total 0.3 0.2 - 1.0 MG/DL  
 ALT (SGPT) 22 12 - 78 U/L  
 AST (SGOT) 19 15 - 37 U/L Alk. phosphatase 232 (H) 45 - 117 U/L Protein, total 5.5 (L) 6.4 - 8.2 g/dL Albumin 2.2 (L) 3.5 - 5.0 g/dL Globulin 3.3 2.0 - 4.0 g/dL A-G Ratio 0.7 (L) 1.1 - 2.2    
CBC WITH AUTOMATED DIFF  Collection Time: 02/04/21  6:37 AM  
 Result Value Ref Range WBC 13.2 (H) 3.6 - 11.0 K/uL  
 RBC 2.83 (L) 3.80 - 5.20 M/uL HGB 8.0 (L) 11.5 - 16.0 g/dL HCT 27.4 (L) 35.0 - 47.0 % MCV 96.8 80.0 - 99.0 FL  
 MCH 28.3 26.0 - 34.0 PG  
 MCHC 29.2 (L) 30.0 - 36.5 g/dL  
 RDW 16.7 (H) 11.5 - 14.5 % PLATELET 041 (H) 531 - 400 K/uL MPV 9.9 8.9 - 12.9 FL  
 NRBC 0.0 0  WBC ABSOLUTE NRBC 0.00 0.00 - 0.01 K/uL NEUTROPHILS 81 (H) 32 - 75 % LYMPHOCYTES 9 (L) 12 - 49 % MONOCYTES 6 5 - 13 % EOSINOPHILS 2 0 - 7 % BASOPHILS 1 0 - 1 % IMMATURE GRANULOCYTES 1 (H) 0.0 - 0.5 % ABS. NEUTROPHILS 10.7 (H) 1.8 - 8.0 K/UL  
 ABS. LYMPHOCYTES 1.2 0.8 - 3.5 K/UL  
 ABS. MONOCYTES 0.8 0.0 - 1.0 K/UL  
 ABS. EOSINOPHILS 0.2 0.0 - 0.4 K/UL  
 ABS. BASOPHILS 0.1 0.0 - 0.1 K/UL  
 ABS. IMM. GRANS. 0.2 (H) 0.00 - 0.04 K/UL  
 DF AUTOMATED MAGNESIUM Collection Time: 02/04/21  6:37 AM  
Result Value Ref Range Magnesium 2.4 1.6 - 2.4 mg/dL GLUCOSE, POC Collection Time: 02/04/21  6:45 AM  
Result Value Ref Range Glucose (POC) 156 (H) 65 - 100 mg/dL Performed by Ericka Taylor (PCT) GLUCOSE, POC Collection Time: 02/04/21 11:35 AM  
Result Value Ref Range Glucose (POC) 135 (H) 65 - 100 mg/dL Performed by Delmy Gaw GLUCOSE, POC Collection Time: 02/04/21  4:31 PM  
Result Value Ref Range Glucose (POC) 294 (H) 65 - 100 mg/dL Performed by Delmy Gaw GLUCOSE, POC Collection Time: 02/04/21  9:19 PM  
Result Value Ref Range Glucose (POC) 354 (H) 65 - 100 mg/dL Performed by Jaden Borges Medications reviewed Current Facility-Administered Medications Medication Dose Route Frequency  furosemide (LASIX) tablet 40 mg  40 mg Oral DAILY  dilTIAZem ER (CARDIZEM CD) capsule 300 mg  300 mg Oral DAILY  albuterol-ipratropium (DUO-NEB) 2.5 MG-0.5 MG/3 ML  3 mL Nebulization Q6HWA RT  
 albuterol-ipratropium (DUO-NEB) 2.5 MG-0.5 MG/3 ML  3 mL Nebulization Q4H PRN  
  0.9% sodium chloride infusion 250 mL  250 mL IntraVENous PRN  predniSONE (DELTASONE) tablet 20 mg  20 mg Oral DAILY WITH BREAKFAST  nystatin (MYCOSTATIN) 100,000 unit/mL oral suspension 500,000 Units  500,000 Units Oral QID  potassium chloride SR (KLOR-CON 10) tablet 20 mEq  20 mEq Oral BID  
 amiodarone (CORDARONE) tablet 200 mg  200 mg Oral DAILY  glucose chewable tablet 16 g  4 Tab Oral PRN  
 dextrose (D50W) injection syrg 12.5-25 g  25-50 mL IntraVENous PRN  
 glucagon (GLUCAGEN) injection 1 mg  1 mg IntraMUSCular PRN  
 insulin lispro (HUMALOG) injection   SubCUTAneous AC&HS  
 0.9% sodium chloride infusion 250 mL  250 mL IntraVENous PRN  
 arformoteroL (BROVANA) neb solution 15 mcg  15 mcg Nebulization BID RT  
 ondansetron (ZOFRAN) injection 4 mg  4 mg IntraVENous Q4H PRN  
 lipase-protease-amylase (CREON 24,000) capsule 2 Cap  2 Cap Oral TID WITH MEALS  
 apixaban (ELIQUIS) tablet 5 mg  5 mg Oral BID  cholecalciferol (VITAMIN D3) (1000 Units /25 mcg) tablet 2 Tab  2,000 Units Oral DAILY  cyanocobalamin (VITAMIN B12) tablet 1,000 mcg  1,000 mcg Oral DAILY  DULoxetine (CYMBALTA) capsule 60 mg  60 mg Oral DAILY  ferrous sulfate tablet 325 mg  325 mg Oral DAILY WITH BREAKFAST  mirtazapine (REMERON) tablet 15 mg  15 mg Oral QHS  montelukast (SINGULAIR) tablet 10 mg  10 mg Oral QPM  
 pantoprazole (PROTONIX) tablet 40 mg  40 mg Oral ACB  sodium chloride (NS) flush 5-40 mL  5-40 mL IntraVENous Q8H  
 sodium chloride (NS) flush 5-40 mL  5-40 mL IntraVENous PRN  
 acetaminophen (TYLENOL) tablet 650 mg  650 mg Oral Q6H PRN Or  
 acetaminophen (TYLENOL) suppository 650 mg  650 mg Rectal Q6H PRN  polyethylene glycol (MIRALAX) packet 17 g  17 g Oral DAILY PRN  
 guaiFENesin-dextromethorphan (ROBITUSSIN DM) 100-10 mg/5 mL syrup 5 mL  5 mL Oral Q4H PRN Care Plan discussed with: Patient/GI/Cardiology/Pul and Nurse Total time spent with patient: 30 minutes. Lencho Culp MD

## 2021-02-05 NOTE — PROGRESS NOTES
RN contacted Shalonda Hand MD of elevated HR in the 140-150s. Pt. HR came down to low 100s. MD ordered to continue to monitor.

## 2021-02-05 NOTE — NURSE NAVIGATOR
Met with patient at bedside who was sitting up in bed finishing up lunch. Patient states her appetite has improved and she is feeling better. Reviewed HF diagnosis, symptoms of HF, and early recognition of symptoms and notification of provider. Discussed discharge and care transition plan. Patient states her goal is to get stronger in rehab and then go to assisted living. Discussed importance of staying active within symptom limitations. Patient given opportunity for questions.

## 2021-02-05 NOTE — DIABETES MGMT
[address] [facility] 
 
FOLLOW-UP NOTE Presentation Vita Lam is a 70 y.o. female admitted 1/26/21 with shortness of breath.  
  
HX:  
Past Medical History (click to expand or collapse) Past Medical History:  
Diagnosis Date  Asthma    
 Atrial fibrillation ([facility] 11/16/2018  Autoimmune disease ([facility]    
  fibromyalgia  CAD (coronary artery disease) 10/9/2015  Closed wedge compression fracture of T7 vertebra ([facility] 11/13/2018  COPD (chronic obstructive pulmonary disease) ([facility])    
 Diabetes ([facility]    
 DVT (deep vein thrombosis) in pregnancy    
 GERD (gastroesophageal reflux disease)    
 Heart failure ([facility])    
 History of vascular access device 09/30/2020  
  4f midline, 12cm at 1cm out placed in L Cephalic by Margaret Jama RN  
 HTN (hypertension)    
 NSTEMI (non-ST elevated myocardial infarction) ([facility] 10/9/2015  Obesity (BMI 30-39.9) 11/13/2018  PAD (peripheral artery disease) ([facility])    
  prior stenting  Sleep apnea    
  wears CPAP  
 Statin intolerance    
  
  
Current clinical course has been uncomplicated.  
  
Diabetes: Patient has known Type 2 diabetes, diagnosed in 2007. Home diabetes medication regimen is Onglyza 5mg daily. Admission  and A1c 5.5% indicate good diabetes control. Subjective Patient laying in bed, alert and oriented. Patient without complaints at this time. Discussed patient with Dr. Brandi Sauer, possible discharge today. Objective Physical exam 
General Alert, oriented and in no acute distress. Conversant and cooperative. Vital Signs Visit Vitals BP (!) 114/44 (BP 1 Location: Right upper arm, BP Patient Position: Sitting) Pulse (!) 112 Temp 97.9 °F (36.6 °C) Resp 18 Ht 5' 7\" (1.702 m) Wt 111 kg (244 lb 11.4 oz) SpO2 97% BMI 38.33 kg/m² Skin  Warm and dry Heart   Regular rate and rhythm. No murmurs, rubs or gallops Lungs  Clear to auscultation without rales or rhonchi Extremities No foot wounds Laboratory Lab Results Component Value Date/Time Hemoglobin A1c 5.5 01/27/2021 01:58 AM  
 
Lab Results Component Value Date/Time LDL, calculated 68.8 10/10/2015 05:00 AM  
 
Lab Results Component Value Date/Time Creatinine (POC) 1.1 08/01/2019 11:41 AM  
 Creatinine 1.25 (H) 02/05/2021 04:39 AM  
 
Lab Results Component Value Date/Time Sodium 137 02/05/2021 04:39 AM  
 Potassium 3.5 02/05/2021 04:39 AM  
 Chloride 94 (L) 02/05/2021 04:39 AM  
 CO2 41 (HH) 02/05/2021 04:39 AM  
 Anion gap 2 (L) 02/05/2021 04:39 AM  
 Glucose 184 (H) 02/05/2021 04:39 AM  
 BUN 26 (H) 02/05/2021 04:39 AM  
 Creatinine 1.25 (H) 02/05/2021 04:39 AM  
 BUN/Creatinine ratio 21 (H) 02/05/2021 04:39 AM  
 GFR est AA 51 (L) 02/05/2021 04:39 AM  
 GFR est non-AA 42 (L) 02/05/2021 04:39 AM  
 Calcium 7.5 (L) 02/05/2021 04:39 AM  
 Bilirubin, total 0.3 02/05/2021 04:39 AM  
 Alk. phosphatase 206 (H) 02/05/2021 04:39 AM  
 Protein, total 5.1 (L) 02/05/2021 04:39 AM  
 Albumin 2.1 (L) 02/05/2021 04:39 AM  
 Globulin 3.0 02/05/2021 04:39 AM  
 A-G Ratio 0.7 (L) 02/05/2021 04:39 AM  
 ALT (SGPT) 20 02/05/2021 04:39 AM  
 
Lab Results Component Value Date/Time ALT (SGPT) 20 02/05/2021 04:39 AM  
 
 
 
Factors affecting BG pattern Factor Dose Comments Nutrition: 
Carb-controlled meals   
60 grams/meal    
Drugs: 
Vasopressor load Steroids HIV Epogen Blood transfusion(s) Other: n/a   
n/a Prednison 20 mg daily 
n/a 
  
  
  
A1cs inaccurate A1cs inaccurate Pain 0/10    
Infection abx discontinued PNA Other: n/a n/a    
 
 
Blood glucose pattern Evaluation This 70year old female, with Type 2 diabetes, did achieve diabetes control prior to admission (PTA), as evidenced by admission BG of 262 and A1c of 5.5%. Based on assessment of diabetes self-care practices, interventions that warrant action while hospitalized include: 
            [x]? Healthy Eating                          
[x]? Being Active [x]? Monitoring                                
  
  
The patient would also benefit from diabetes self-management education and support [facility]) after discharge. 
  
During this hospitalization, the patient has achieved inpatient blood glucose target of 100-180mg/dl. BG's have ranged 123-354 over the past 24 hours. Factors that have played a role include: 
[x]? Kidney dysfunction 
[x]? Glucocorticoid Use  
  
Basal insulin isn't in use.  
  
Bolus insulin isn't in use. Patient is eating meals. 
  
Corrective insulin is in use. Patient has required 9 units corrective insulin over the past 24 hours.   
  
To optimize BG control and support a positive health outcome, would utilize the Subcutaneous Insulin Order set (8508). Impression: As suspected, corrective insulin alone has not been sufficient to consistently maintain patient's BG at target. Patient home regimen is Onglyza daily. Would suspect patient needs to have low dose basal insulin to maintain BG control. Assessment and Plan Nursing Diagnosis Risk for unstable blood glucose pattern Nursing Intervention Domain 7797 Decision-making Support Nursing Interventions Examined current inpatient diabetes control Explored factors facilitating and impeding inpatient management Recommendations Recommend: 
  
[x]? Use of Subcutaneous Insulin Order set (9315) Insulin Use Recommendation Basal                                      (Based on weight, BMI & GFR) Addresses basic metabolic needs Lantus 20 units daily Nutritional                                      (Based on CHO load) Addresses nutrition interventions If patient experiences pre-prandial BG > 200mg/dL, start Humalog 6 units with meals. Corrective                                       (Offset gaps in dosing) Useful in adjusting insulin dosing Correctional Scale for Normal Sensitivity 
  
200-249- 2units Humalog [phone] Humalog [phone] Humalog [phone] Humalog Over 400- 10units Humalog 
  
Do NOT hold for NPO; give in addition to meal time insulin dose. 
  
If patient does not eat, -give correction dose only.  
   
  
[x]? Discharge Recommendations: 
 Resume Onglyza 5mg daily  
 
  
[x]? Referral to 
  
[x]? Diabetes Self-Management Training through [facility] (Phone [phone] to schedule appointment) Time Spent/ Billing Codes Total time spent with patient: 15 Minutes   I personally reviewed chart, notes, data and current medications in the medical record. I have personally examined and treated the patient at bedside during this period. [x] 53618 IP subsequent hospital care - 15 minutes MIKAYLA Wagner Diabetes Clinical Nurse Specialist 
[facility] Access via [employer]

## 2021-02-05 NOTE — PROGRESS NOTES
Bedside and Verbal shift change report given to Angelic Yang (oncoming nurse) by Claudia BAZZI (offgoing nurse).  Report included the following information SBAR, Kardex, Intake/Output, MAR and Recent Results.

## 2021-02-06 NOTE — PROGRESS NOTES
Gastrointestinal Progress Note 2/6/2021 Admit Date: 1/26/2021 Subjective:  
 
Since last seen, states feeling better. Diarrhea resolved, appetite improved. Current Facility-Administered Medications Medication Dose Route Frequency  furosemide (LASIX) tablet 40 mg  40 mg Oral DAILY  dilTIAZem ER (CARDIZEM CD) capsule 300 mg  300 mg Oral DAILY  albuterol-ipratropium (DUO-NEB) 2.5 MG-0.5 MG/3 ML  3 mL Nebulization Q6HWA RT  
 albuterol-ipratropium (DUO-NEB) 2.5 MG-0.5 MG/3 ML  3 mL Nebulization Q4H PRN  
 0.9% sodium chloride infusion 250 mL  250 mL IntraVENous PRN  predniSONE (DELTASONE) tablet 20 mg  20 mg Oral DAILY WITH BREAKFAST  nystatin (MYCOSTATIN) 100,000 unit/mL oral suspension 500,000 Units  500,000 Units Oral QID  potassium chloride SR (KLOR-CON 10) tablet 20 mEq  20 mEq Oral BID  
 amiodarone (CORDARONE) tablet 200 mg  200 mg Oral DAILY  glucose chewable tablet 16 g  4 Tab Oral PRN  
 dextrose (D50W) injection syrg 12.5-25 g  25-50 mL IntraVENous PRN  
 glucagon (GLUCAGEN) injection 1 mg  1 mg IntraMUSCular PRN  
 insulin lispro (HUMALOG) injection   SubCUTAneous AC&HS  
 0.9% sodium chloride infusion 250 mL  250 mL IntraVENous PRN  
 arformoteroL (BROVANA) neb solution 15 mcg  15 mcg Nebulization BID RT  
 ondansetron (ZOFRAN) injection 4 mg  4 mg IntraVENous Q4H PRN  
 lipase-protease-amylase (CREON 24,000) capsule 2 Cap  2 Cap Oral TID WITH MEALS  
 apixaban (ELIQUIS) tablet 5 mg  5 mg Oral BID  cholecalciferol (VITAMIN D3) (1000 Units /25 mcg) tablet 2 Tab  2,000 Units Oral DAILY  cyanocobalamin (VITAMIN B12) tablet 1,000 mcg  1,000 mcg Oral DAILY  DULoxetine (CYMBALTA) capsule 60 mg  60 mg Oral DAILY  ferrous sulfate tablet 325 mg  325 mg Oral DAILY WITH BREAKFAST  mirtazapine (REMERON) tablet 15 mg  15 mg Oral QHS  montelukast (SINGULAIR) tablet 10 mg  10 mg Oral QPM  
 pantoprazole (PROTONIX) tablet 40 mg  40 mg Oral ACB  sodium chloride (NS) flush 5-40 mL  5-40 mL IntraVENous Q8H  
 sodium chloride (NS) flush 5-40 mL  5-40 mL IntraVENous PRN  
 acetaminophen (TYLENOL) tablet 650 mg  650 mg Oral Q6H PRN Or  
 acetaminophen (TYLENOL) suppository 650 mg  650 mg Rectal Q6H PRN  polyethylene glycol (MIRALAX) packet 17 g  17 g Oral DAILY PRN  
 guaiFENesin-dextromethorphan (ROBITUSSIN DM) 100-10 mg/5 mL syrup 5 mL  5 mL Oral Q4H PRN Objective:  
 
Blood pressure (!) 119/55, pulse 82, temperature 98 °F (36.7 °C), resp. rate 18, height 5' 7\" (1.702 m), weight 111 kg (244 lb 11.4 oz), SpO2 99 %. No intake/output data recorded. 02/04 1901 - 02/06 0700 In: 1100 [P.O.:1080; I.V.:20] Out: 3250 [[facility]:[id number]] EXAM:  GENERAL: alert, cooperative, no distress, HEART: irregular rhythm, LUNGS: chest clear, no wheezing, rales, normal symmetric air entry, ABDOMEN:  Bowel sounds are normal, liver is not enlarged, spleen is not enlarged and EXTREMITY: edema much improved Data Review Recent Results (from the past 24 hour(s)) GLUCOSE, POC Collection Time: 02/05/21 11:56 AM  
Result Value Ref Range Glucose (POC) 187 (H) 65 - 100 mg/dL Performed by [employer] GLUCOSE, POC Collection Time: 02/05/21  4:14 PM  
Result Value Ref Range Glucose (POC) 263 (H) 65 - 100 mg/dL Performed by [employer] GLUCOSE, POC Collection Time: 02/05/21  9:19 PM  
Result Value Ref Range Glucose (POC) 230 (H) 65 - 100 mg/dL Performed by Yuki Rios (JERRY) CBC WITH AUTOMATED DIFF Collection Time: 02/06/21  4:32 AM  
Result Value Ref Range WBC 15.5 (H) 3.6 - 11.0 K/uL  
 RBC 2.89 (L) 3.80 - 5.20 M/uL HGB 8.0 (L) 11.5 - 16.0 g/dL HCT 28.0 (L) 35.0 - 47.0 % MCV 96.9 80.0 - 99.0 FL  
 MCH 27.7 26.0 - 34.0 PG  
 MCHC 28.6 (L) 30.0 - 36.5 g/dL  
 RDW 16.5 (H) 11.5 - 14.5 % PLATELET 813 992 - 795 K/uL NRBC 0.1 (H) 0  WBC ABSOLUTE NRBC 0.02 (H) 0.00 - 0.01 K/uL NEUTROPHILS 81 (H) 32 - 75 % LYMPHOCYTES 8 (L) 12 - 49 % MONOCYTES 7 5 - 13 % EOSINOPHILS 2 0 - 7 % BASOPHILS 1 0 - 1 % IMMATURE GRANULOCYTES 1 (H) 0.0 - 0.5 % ABS. NEUTROPHILS 12.5 (H) 1.8 - 8.0 K/UL  
 ABS. LYMPHOCYTES 1.2 0.8 - 3.5 K/UL  
 ABS. MONOCYTES 1.1 (H) 0.0 - 1.0 K/UL  
 ABS. EOSINOPHILS 0.3 0.0 - 0.4 K/UL  
 ABS. BASOPHILS 0.2 (H) 0.0 - 0.1 K/UL  
 ABS. IMM. GRANS. 0.2 (H) 0.00 - 0.04 K/UL  
 DF SMEAR SCANNED    
 RBC COMMENTS HYPOCHROMIA 2+ 
    
 RBC COMMENTS OVALOCYTES PRESENT 
    
 RBC COMMENTS ANISOCYTOSIS 
1+ METABOLIC PANEL, COMPREHENSIVE Collection Time: 02/06/21  4:32 AM  
Result Value Ref Range Sodium 138 136 - 145 mmol/L Potassium 4.0 3.5 - 5.1 mmol/L Chloride 97 97 - 108 mmol/L  
 CO2 37 (H) 21 - 32 mmol/L Anion gap 4 (L) 5 - 15 mmol/L Glucose 140 (H) 65 - 100 mg/dL BUN 28 (H) 6 - 20 MG/DL Creatinine 1.17 (H) 0.55 - 1.02 MG/DL  
 BUN/Creatinine ratio 24 (H) 12 - 20 GFR est AA 55 (L) >60 ml/min/1.73m2 GFR est non-AA 45 (L) >60 ml/min/1.73m2 Calcium 8.0 (L) 8.5 - 10.1 MG/DL Bilirubin, total 0.4 0.2 - 1.0 MG/DL  
 ALT (SGPT) 24 12 - 78 U/L  
 AST (SGOT) 29 15 - 37 U/L Alk. phosphatase 226 (H) 45 - 117 U/L Protein, total 5.7 (L) 6.4 - 8.2 g/dL Albumin 2.3 (L) 3.5 - 5.0 g/dL Globulin 3.4 2.0 - 4.0 g/dL A-G Ratio 0.7 (L) 1.1 - 2.2 GLUCOSE, POC Collection Time: 02/06/21  8:25 AM  
Result Value Ref Range Glucose (POC) 121 (H) 65 - 100 mg/dL Performed by Ronen Gallo (CON) SARS-COV-2 Collection Time: 02/06/21  8:30 AM  
Result Value Ref Range SARS-CoV-2 Please find results under separate order Assessment:  
 
Active Problems: 
  Chronic respiratory failure with hypoxia ([address] (11/2/2020) Overview: On 3L NC at home Leukocytosis (11/3/2020) Pneumonia (1/26/2021) SOB (shortness of breath) (1/26/2021) Anemia (1/26/2021) Acute on chronic systolic CHF (congestive heart failure) ([facility] (1/26/2021) Uncontrolled type 2 diabetes mellitus with hyperglycemia ([facility] (1/26/2021) Plan: 1. Had supplemental parenteral B12 for anemia. Will place oral maintenance 2. Will see again as needed

## 2021-02-06 NOTE — PROGRESS NOTES
Verbal shift change report given to Remington Anderson RN (oncoming nurse) by Primitivo Alejandre RN (offgoing nurse). Report included the following information SBAR, Kardex, Intake/Output, MAR and Recent Results.

## 2021-02-06 NOTE — PROGRESS NOTES
Daily Progress Note: 2/6/2021 Darrin Perkins MD 
 
Assessment/Plan: 1. Pneumonia -  IV antibiotics Azithromycin 500 mg IV and Ceftriaxone 1 gram IV completed 2. Shortness of breath (SOB) - with history of chronic hypoxic respiratory failure on 3 liters chronically 
     - continue supplemental oxygen 
  
3. Acute on chronic diastolic CHF (HFpEF) - diuresis per Cardiology 
     - monitor fluid status closely 
     - ECHO results as under Data Review - little changed cardiac function 
     - cardiology consulted 
  
4. Negative for COVID 19 virus infection 
     - COVID 19 test is negative  
  
5.  Leukocytosis 
     - monitor, antibiotics as above 
  
6. Anemia 
     - monitor and transfuse for hemoglobin < 7.0. 
  
7. Uncontrolled type 2 DM with hyperglycemia 
     - SSI inpt 
     - resume outpt tx when discharged 
  
8. Acute superimposed on chronic kidney disease stage 3:  improved 
     - monitor and tx as needed 9. Elevated alkaline phosphatase 
     - monitor and treat as needed 
  
10. Hypertension 
       - hold on home Lisinopril - HCTZ with elevated creatinine and soft BPs - restart if needed. - On Amlodipine per cards - Dilt/Lasix per Cards 
  
11. History of GI bleed - s/p upper GI endoscopy and colonoscopy on 1/4/2021 with Adenomatous polyp of transverse colon [D12.3] [facility] [B37.81] Gastritis and duodenitis [  
  - GI consulted 12. Obesity 
       - recommended weight loss, heart healthy diet, and lifestyle modification 13. GERD 
       - Protonix 
  
14. Paroxysmal atrial fibrillation (afib) 
       - on long term anticoagulation on Eliquis - hold if further bleeding 
       - cleared to restart Eliquis tx per GI/Cardiology 
  
CODE STATUS:  FULL CODE as discussed with patient who requests the same. Problem List: 
Problem List as of 2/6/2021 Date Reviewed: 11/2/2020 Codes Class Noted - Resolved Pneumonia ICD-10-CM: J18.9 ICD-9-CM: 267  1/26/2021 - Present SOB (shortness of breath) ICD-10-CM: R06.02 
ICD-9-CM: 786.05  1/26/2021 - Present Anemia ICD-10-CM: D64.9 ICD-9-CM: 285.9  1/26/2021 - Present Acute on chronic systolic CHF (congestive heart failure) (HCC) ICD-10-CM: E36.63 ICD-9-CM: 428.23, 428.0  1/26/2021 - Present Uncontrolled type 2 diabetes mellitus with hyperglycemia (HCC) ICD-10-CM: E11.65 ICD-9-CM: 250.02  1/26/2021 - Present Left lower lobe pneumonia ICD-10-CM: J18.9 ICD-9-CM: 296  1/1/2021 - Present Leukocytosis ICD-10-CM: Q21.921 ICD-9-CM: 288.60  11/3/2020 - Present Chronic respiratory failure with hypoxia [facility]) ICD-10-CM: J96.11 
ICD-9-CM: 518.83, 799.02  11/2/2020 - Present Overview Signed 11/2/2020 10:12 PM by Becca Billingsley MD  
  On 3L NC at home Cellulitis of lower extremity ICD-10-CM: L03.119 ICD-9-CM: 682.6  3/23/2020 - Present ASO (arteriosclerosis obliterans) ICD-10-CM: I70.90 ICD-9-CM: 440.9  9/5/2019 - Present PVD (peripheral vascular disease) ([facility] ICD-10-CM: I73.9 ICD-9-CM: 443.9  8/1/2019 - Present Severe obesity ([facility] ICD-10-CM: E66.01 
ICD-9-CM: 278.01  7/30/2019 - Present COPD (chronic obstructive pulmonary disease) ([facility]) ICD-10-CM: J44.9 ICD-9-CM: 192  7/13/2019 - Present Normocytic anemia ICD-10-CM: D64.9 ICD-9-CM: 285.9  7/13/2019 - Present PAD (peripheral artery disease) (HCC) ICD-10-CM: I73.9 ICD-9-CM: 443.9  7/13/2019 - Present Mild intermittent asthma ICD-10-CM: J45.20 ICD-9-CM: 493.90  5/17/2019 - Present Brachial artery thrombus (HCC) ICD-10-CM: D18.0 ICD-9-CM: 444.21  4/26/2019 - Present Sleep apnea ICD-10-CM: G47.30 ICD-9-CM: 780.57  1/5/2019 - Present  Overview Signed 1/5/2019  8:41 PM by Sujata Fernandes DO  
  wears CPAP 
  
  
   
 Atrial fibrillation [facility]) ICD-10-CM: I48.91 
ICD-9-CM: 427.31  11/16/2018 - Present Obesity (BMI 30-39.9) (Chronic) ICD-10-CM: K61.0 ICD-9-CM: 278.00  11/13/2018 - Present Recurrent deep vein thrombosis (DVT) (HCC) ICD-10-CM: I82.409 ICD-9-CM: 453.40  3/30/2018 - Present Chronic anticoagulation (Chronic) ICD-10-CM: Z79.01 
ICD-9-CM: V58.61  3/30/2018 - Present Essential hypertension (Chronic) ICD-10-CM: I10 
ICD-9-CM: 401.9  1/22/2016 - Present CAD (coronary artery disease) ICD-10-CM: I25.10 ICD-9-CM: 414.00  10/9/2015 - Present Type II diabetes mellitus (HCC) (Chronic) ICD-10-CM: E11.9 ICD-9-CM: 250.00  10/9/2015 - Present RESOLVED: Syncope and collapse ICD-10-CM: R55 
ICD-9-CM: 780.2  9/29/2020 - 11/2/2020 RESOLVED: Cellulitis ICD-10-CM: L03.90 ICD-9-CM: 682.9  3/23/2020 - 5/29/2020 RESOLVED: Hypokalemia ICD-10-CM: E87.6 ICD-9-CM: 276.8  3/23/2020 - 5/29/2020 RESOLVED: Hyponatremia ICD-10-CM: E87.1 ICD-9-CM: 276.1  3/23/2020 - 5/29/2020 RESOLVED: Diarrhea ICD-10-CM: R19.7 ICD-9-CM: 787.91  3/23/2020 - 5/29/2020 RESOLVED: Syncope and collapse ICD-10-CM: R55 
ICD-9-CM: 780.2  7/13/2019 - 5/29/2020 RESOLVED: UTI (urinary tract infection) ICD-10-CM: N39.0 ICD-9-CM: 599.0  7/13/2019 - 11/2/2020 RESOLVED: Sepsis ([address] ICD-10-CM: A41.9 ICD-9-CM: 038.9, 995.91  7/13/2019 - 11/3/2020 RESOLVED: Leg wound, left ICD-10-CM: P00.305T ICD-9-CM: 891.0  7/13/2019 - 5/29/2020 RESOLVED: Leg laceration, right, initial encounter ICD-10-CM: Y46.050C ICD-9-CM: 894.0  7/13/2019 - 5/29/2020 RESOLVED: Cellulitis of right upper arm ICD-10-CM: L03.113 ICD-9-CM: 682.3  5/17/2019 - 5/29/2020 RESOLVED: Gangrene of finger of right hand ([facility] ICD-10-CM: J91 
ICD-9-CM: 785.4  5/17/2019 - 11/2/2020 RESOLVED: Bowel obstruction ([facility] ICD-10-CM: H48.392 ICD-9-CM: 560.9  1/8/2019 - 5/29/2020 RESOLVED: Partial small bowel obstruction ([facility] ICD-10-CM: K56.600 ICD-9-CM: 560.9  1/5/2019 - 5/29/2020 RESOLVED: Dyspnea ICD-10-CM: R06.00 
ICD-9-CM: 786.09  11/13/2018 - 5/29/2020 RESOLVED: ARF (acute renal failure) (HCC) ICD-10-CM: N17.9 ICD-9-CM: 584.9  11/13/2018 - 5/29/2020 RESOLVED: Closed wedge compression fracture of T7 vertebra ([facility] ICD-10-CM: Z05.730I ICD-9-CM: 805.2  11/13/2018 - 5/29/2020 RESOLVED: Type 2 diabetes with nephropathy ([facility] ICD-10-CM: E11.21 
ICD-9-CM: 250.40, 583.81  10/1/2018 - 11/2/2020 RESOLVED: Chest pain ICD-10-CM: R07.9 ICD-9-CM: 786.50  6/27/2018 - 5/29/2020 RESOLVED: Abdominal pain ICD-10-CM: R10.9 ICD-9-CM: 789.00  6/27/2018 - 5/29/2020 RESOLVED: Coronary artery disease involving native coronary artery without angina pectoris (Chronic) ICD-10-CM: I25.10 ICD-9-CM: 414.01  1/22/2016 - 11/2/2020 RESOLVED: HTN (hypertension) ICD-10-CM: I10 
ICD-9-CM: 401.9  10/9/2015 - 1/22/2016 RESOLVED: NSTEMI (non-ST elevated myocardial infarction) ([facility] ICD-10-CM: I21.4 ICD-9-CM: 410.70  10/9/2015 - 5/29/2020 Subjective: 70 y.o. female with past medical history of chronic hypoxic respiratory failure (on 3 liters home oxygen), asthma, atrial fibrillation (Afib), autoimmune disease, fibromyalgia, CAD, heart failure (HFpEF), T7 vertebral compression fracture, COPD, type 2 DM, DVT, GERD, hypertension, NSTEMI, PAD, sleep apnea, obesity presented to the ED from home with chief complaints of shortness of breath (SOB) and low hemoglobin. Patient has chronic SOB but worsened. She was last hospitalized here from 1/1/2021 - 1/15/2021 with left lower lobe pneumonia, acute on chronic hypoxic respiratory failure, sepsis, GI bleed, anemia. Patient was transfused with 2 units PRBCs with hemoglobin = 4.9 on 1/1/2021. Last Hgb= 6.5 on 1/21/2021. On arrival in the ED today, initial recorded vital signs were BP = 139/47, HR= 82, RR= 22, O2sat= 94% on 4 liters O2 nasal cannula (NC). WBC = 18,400. proBNP = 1,443. Chest xray portable showed cardiomegaly, interstitial edema, and density in left lung base. Patient is now seen for admission to the hospitalist service for continued evaluation and treatments. There were no reports of new onset syncope, loss of consciousness, headache, neck pain, visual disturbance, numbness, paresthesias, focal weakness, chest pain, palpitations, abdominal pain, nausea, vomiting, diarrhea, melena, dysuria, hematuria, calf pain, increased leg swelling/ edema, fever, chills, rash, or known COVID 19 exposure. (Dr Marcela Lara) 1/27:  Feeling better with less shortness of breath. Hb low so needs another transfusion. No clear source of bleeding. Covid negative. GI to see. 1/28:  Feels some better except sore throat - looks like thrush. Less shortness of breath. Hb up to 7.9 this AM after 1 unit PRC. K+ sl low - replacing. :  Little change from yesterday. BP continues to be on the low side and pt reports feeling lightheaded with sitting up - Cards adjusting meds. Hb down to 7.3 - watching - will likely need another transfusion. WBC 19K - likely due to steroids - weaning. :  Reports feeling better and no longer lightheaded. Cards has adjusted meds  Afeb. Will do 1-2 more days of IV antibiotics. :  No new complaints this AM.  Cards has signed off. BP remains better. Hb at 7.0 - given her hx will transfuse today. Risks v benefits discussed. :  No new complaints. She reports she feels a little stronger. AM labs pending. :  Currently no complaints. Desats with even smallest amt of exercise and O2 placed back on 5 liters NC. Pul continues to see. Cards seeing again. Hb 8.1 yesterday - AM Hb pending. 2/3:  Resting comfortably in bed. No complaints. O2 down to 4 liters. CXR yesterday continued to show Pul Edema - more diuresis needed. :  No complaints. Feels less short of breath as long as she does not \"do too much. \"  Lasix IV given by Cards - pt reports good diuresis but I/O shows little out. Still on 4 liters - try to wean down to her usual 3 liters O2. AM labs pending. :  No complaints. O2 down to 3 liters. Stable for SNF rehab when they can take her. :  Feeling better and less short of breath. Screening Covid testing is pending for SNF rehab. Review of Systems: A comprehensive review of systems was negative except for that written in the HPI. Objective:  
Physical Exam:  
Visit Vitals BP (!) 112/52 (BP 1 Location: Left upper arm, BP Patient Position: At rest) Pulse 84 Temp 97.9 °F (36.6 °C) Resp 18 Ht 5' 7\" (1.702 m) Wt 111 kg (244 lb 11.4 oz) SpO2 96% BMI 38.33 kg/m² O2 Flow Rate (L/min): 3 l/min O2 Device: Nasal cannula Temp (24hrs), Av °F (36.7 °C), Min:97.9 °F (36.6 °C), Max:98.1 °F (36.7 °C) No intake/output data recorded. 02/04 1901 - 02/06 0700 In: 1100 [P.O.:1080; I.V.:20] Out: 3250 [HUNPY:0535] General:  Alert, obese, cooperative, no distress, appears stated age. Head:  Normocephalic, without obvious abnormality, atraumatic. Eyes:  Conjunctivae/corneas clear. EOMs intact. Throat: Lips, mucosa, and tongue moist; throat red with a few white patches on tongue. Neck: Supple, symmetrical, trachea midline, no adenopathy, thyroid: no enlargement/tenderness/nodules, no carotid bruit and no JVD. Lungs:   Clear to auscultation bilaterally. Chest wall:  No tenderness or deformity. Heart:  irreg with rate in 75N, no murmur, click, rub or gallop. Abdomen:   Soft, non-tender. Bowel sounds normal. No masses,  No organomegaly. Extremities: no cyanosis. 1+ LE edema on top of chronic stasis changes. No calf tenderness or cords. Pulses: 2+ and symmetric all extremities. Skin:  turgor normal. No rashes Neurologic:   Alert and oriented X 3.  equal.  No cogwheeling or rigidity. Gait not tested at this time. Sensation grossly normal to touch. Gross motor of extremities normal except generalized weakness. Data Review:  
ECHO 1/29/21 Interpretation Summary Result status: Final result · Pericardium: There is a moderate left pleural effusion. · LV: Estimated LVEF is 60 - 65%. Normal cavity size, wall thickness and systolic function (ejection fraction normal). · TV: Mild tricuspid valve regurgitation is present. Portable chest single view dated 2/2/2021 Comparison chest dated 1/30/2021 History is increasing O2 needs A single portable AP upright view of the chest was obtained. The cardiac 
silhouette continues to be enlarged. There continues be opacification of the 
left lung base and a few air bronchograms are noted in the left hilar/parahilar 
region. This is suggestive of developing atelectasis/infiltration. There is some atelectatic change in the mid/upper portion of the left lung. Some hazy density 
is associated with the right lung. This is suggestive of developing 
infiltration. There is blunting of both costophrenic angles. This is compatible 
with the presence of bilateral pleural effusions. IMPRESSION 1. Continued evidence of mild pulmonary edema. 2. Presence of hazy density involving both lungs suggestive of developing 
infiltration/atelectatic change. 3. Presence of bilateral pleural effusions Recent Days: 
Recent Labs 02/06/21 
5519 02/05/21 
5806 02/04/21 
7900 WBC 15.5* 13.4* 13.2* HGB 8.0* 7.4* 8.0*  
HCT 28.0* 25.7* 27.4*  
 393 416* Recent Labs 02/06/21 
9474 02/05/21 
7911 02/04/21 
1704  137 140  
K 4.0 3.5 3.7 CL 97 94* 95* CO2 37* 41* 41* * 184* 145* BUN 28* 26* 25* CREA 1.17* 1.25* 1.33* CA 8.0* 7.5* 8.0*  
MG  --   --  2.4 ALB 2.3* 2.1* 2.2* TBILI 0.4 0.3 0.3 ALT 24 20 22 No results for input(s): PH, PCO2, PO2, HCO3, FIO2 in the last 72 hours. 24 Hour Results: 
Recent Results (from the past 24 hour(s)) GLUCOSE, POC Collection Time: 02/05/21 11:56 AM  
Result Value Ref Range Glucose (POC) 187 (H) 65 - 100 mg/dL Performed by Junior Brothers GLUCOSE, POC Collection Time: 02/05/21  4:14 PM  
Result Value Ref Range Glucose (POC) 263 (H) 65 - 100 mg/dL Performed by Junior Brothers GLUCOSE, POC Collection Time: 02/05/21  9:19 PM  
Result Value Ref Range Glucose (POC) 230 (H) 65 - 100 mg/dL Performed by Andrew Suazo (CON) CBC WITH AUTOMATED DIFF Collection Time: 02/06/21  4:32 AM  
Result Value Ref Range WBC 15.5 (H) 3.6 - 11.0 K/uL  
 RBC 2.89 (L) 3.80 - 5.20 M/uL HGB 8.0 (L) 11.5 - 16.0 g/dL HCT 28.0 (L) 35.0 - 47.0 % MCV 96.9 80.0 - 99.0 FL  
 MCH 27.7 26.0 - 34.0 PG  
 MCHC 28.6 (L) 30.0 - 36.5 g/dL  
 RDW 16.5 (H) 11.5 - 14.5 % PLATELET 913 379 - 560 K/uL NRBC 0.1 (H) 0  WBC ABSOLUTE NRBC 0.02 (H) 0.00 - 0.01 K/uL NEUTROPHILS PENDING % LYMPHOCYTES PENDING % MONOCYTES PENDING % EOSINOPHILS PENDING % BASOPHILS PENDING % IMMATURE GRANULOCYTES PENDING %  
 ABS. NEUTROPHILS PENDING K/UL  
 ABS. LYMPHOCYTES PENDING K/UL  
 ABS. MONOCYTES PENDING K/UL  
 ABS. EOSINOPHILS PENDING K/UL  
 ABS. BASOPHILS PENDING K/UL  
 ABS. IMM. GRANS. PENDING K/UL  
 DF PENDING   
METABOLIC PANEL, COMPREHENSIVE Collection Time: 02/06/21  4:32 AM  
Result Value Ref Range Sodium 138 136 - 145 mmol/L Potassium 4.0 3.5 - 5.1 mmol/L Chloride 97 97 - 108 mmol/L  
 CO2 37 (H) 21 - 32 mmol/L Anion gap 4 (L) 5 - 15 mmol/L Glucose 140 (H) 65 - 100 mg/dL BUN 28 (H) 6 - 20 MG/DL Creatinine 1.17 (H) 0.55 - 1.02 MG/DL  
 BUN/Creatinine ratio 24 (H) 12 - 20 GFR est AA 55 (L) >60 ml/min/1.73m2 GFR est non-AA 45 (L) >60 ml/min/1.73m2 Calcium 8.0 (L) 8.5 - 10.1 MG/DL Bilirubin, total 0.4 0.2 - 1.0 MG/DL  
 ALT (SGPT) 24 12 - 78 U/L  
 AST (SGOT) 29 15 - 37 U/L Alk. phosphatase 226 (H) 45 - 117 U/L Protein, total 5.7 (L) 6.4 - 8.2 g/dL Albumin 2.3 (L) 3.5 - 5.0 g/dL Globulin 3.4 2.0 - 4.0 g/dL A-G Ratio 0.7 (L) 1.1 - 2.2 Medications reviewed Current Facility-Administered Medications Medication Dose Route Frequency  furosemide (LASIX) tablet 40 mg  40 mg Oral DAILY  dilTIAZem ER (CARDIZEM CD) capsule 300 mg  300 mg Oral DAILY  albuterol-ipratropium (DUO-NEB) 2.5 MG-0.5 MG/3 ML  3 mL Nebulization Q6HWA RT  
 albuterol-ipratropium (DUO-NEB) 2.5 MG-0.5 MG/3 ML  3 mL Nebulization Q4H PRN  
 0.9% sodium chloride infusion 250 mL  250 mL IntraVENous PRN  predniSONE (DELTASONE) tablet 20 mg  20 mg Oral DAILY WITH BREAKFAST  nystatin (MYCOSTATIN) 100,000 unit/mL oral suspension 500,000 Units  500,000 Units Oral QID  potassium chloride SR (KLOR-CON 10) tablet 20 mEq  20 mEq Oral BID  
  amiodarone (CORDARONE) tablet 200 mg  200 mg Oral DAILY  glucose chewable tablet 16 g  4 Tab Oral PRN  
 dextrose (D50W) injection syrg 12.5-25 g  25-50 mL IntraVENous PRN  
 glucagon (GLUCAGEN) injection 1 mg  1 mg IntraMUSCular PRN  
 insulin lispro (HUMALOG) injection   SubCUTAneous AC&HS  
 0.9% sodium chloride infusion 250 mL  250 mL IntraVENous PRN  
 arformoteroL (BROVANA) neb solution 15 mcg  15 mcg Nebulization BID RT  
 ondansetron (ZOFRAN) injection 4 mg  4 mg IntraVENous Q4H PRN  
 lipase-protease-amylase (CREON 24,000) capsule 2 Cap  2 Cap Oral TID WITH MEALS  
 apixaban (ELIQUIS) tablet 5 mg  5 mg Oral BID  cholecalciferol (VITAMIN D3) (1000 Units /25 mcg) tablet 2 Tab  2,000 Units Oral DAILY  cyanocobalamin (VITAMIN B12) tablet 1,000 mcg  1,000 mcg Oral DAILY  DULoxetine (CYMBALTA) capsule 60 mg  60 mg Oral DAILY  ferrous sulfate tablet 325 mg  325 mg Oral DAILY WITH BREAKFAST  mirtazapine (REMERON) tablet 15 mg  15 mg Oral QHS  montelukast (SINGULAIR) tablet 10 mg  10 mg Oral QPM  
 pantoprazole (PROTONIX) tablet 40 mg  40 mg Oral ACB  sodium chloride (NS) flush 5-40 mL  5-40 mL IntraVENous Q8H  
 sodium chloride (NS) flush 5-40 mL  5-40 mL IntraVENous PRN  
 acetaminophen (TYLENOL) tablet 650 mg  650 mg Oral Q6H PRN Or  
 acetaminophen (TYLENOL) suppository 650 mg  650 mg Rectal Q6H PRN  polyethylene glycol (MIRALAX) packet 17 g  17 g Oral DAILY PRN  
 guaiFENesin-dextromethorphan (ROBITUSSIN DM) 100-10 mg/5 mL syrup 5 mL  5 mL Oral Q4H PRN Care Plan discussed with: Patient/GI/Cardiology/Pul and Nurse Total time spent with patient: 30 minutes.  
Jean Mckeon MD

## 2021-02-06 NOTE — PROGRESS NOTES
Bedside and Verbal shift change report given to Gabe Carmona RN (oncoming nurse) by Peng Mahmood RN and [id number], RN (offgoing nurses). Report included the following information SBAR, Kardex, Intake/Output, MAR, Accordion and Recent Results.

## 2021-02-07 NOTE — PROGRESS NOTES
Daily Progress Note: 2/7/2021 Ila Santiago MD 
 
Assessment/Plan: 1. Pneumonia -  IV antibiotics Azithromycin 500 mg IV and Ceftriaxone 1 gram IV completed 2. Shortness of breath (SOB) - with history of chronic hypoxic respiratory failure on 3 liters chronically 
     - continue supplemental oxygen 
  
3. Acute on chronic diastolic CHF (HFpEF) - monitor fluid status closely 
     - ECHO results as under Data Review - little changed cardiac function 
     - cardiology consulted and managed CHF and fluid status 
     - monitor electrolytes frequently at SNF 
     - monitor I/O at SNF, check pro-BNP at SNF 
  
4.  Negative for COVID 19 virus infection 
     - COVID 19 test is negative at admission 
     - screening tests pending 
  
5. Leukocytosis 
     - monitor at SNF, antibiotics completed as above 
  
6. Anemia 
     - monitor and transfuse for hemoglobin < 7.0. 
     - monitor frequently at [facility] rehab - Eliquis restarted per GI and Card 
  
7. Uncontrolled type 2 DM with hyperglycemia 
     - SSI inpt 
     - resume outpt tx when discharged and monitor for need for med adjustment at SNF 
  
8. Acute superimposed on chronic kidney disease stage 3:  improved 
     - monitor at SNF and tx as needed 9. Elevated alkaline phosphatase - improved 
     - monitor at SNF and treat as needed 
  
10. Hypertension 
       - hold on home Lisinopril - HCTZ with elevated creatinine and soft BPs - restart if needed. - On Amlodipine per cards - Dilt/Lasix per Cards 
  
11. History of GI bleed - s/p upper GI endoscopy and colonoscopy on 1/4/2021 with Adenomatous polyp of transverse colon [D12.3] Candida esophagitis ([facility] [B37.81] Gastritis and duodenitis [  
  - GI consulted 12. Obesity 
       - recommended weight loss, heart healthy diet, and lifestyle modification 13.   GERD 
       - Protonix 
  
 14.  Paroxysmal atrial fibrillation (afib) 
       - on long term anticoagulation on Eliquis - hold if further bleeding 
       - cleared to restart Eliquis tx per GI/Cardiology 
  
CODE STATUS:  FULL CODE as discussed with patient who requests the same. Problem List: 
Problem List as of 2/7/2021 Date Reviewed: 11/2/2020 Codes Class Noted - Resolved Pneumonia ICD-10-CM: J18.9 ICD-9-CM: 516  1/26/2021 - Present SOB (shortness of breath) ICD-10-CM: R06.02 
ICD-9-CM: 786.05  1/26/2021 - Present Anemia ICD-10-CM: D64.9 ICD-9-CM: 285.9  1/26/2021 - Present Acute on chronic systolic CHF (congestive heart failure) (HCC) ICD-10-CM: J15.12 ICD-9-CM: 428.23, 428.0  1/26/2021 - Present Uncontrolled type 2 diabetes mellitus with hyperglycemia (HCC) ICD-10-CM: E11.65 ICD-9-CM: 250.02  1/26/2021 - Present Left lower lobe pneumonia ICD-10-CM: J18.9 ICD-9-CM: 255  1/1/2021 - Present Leukocytosis ICD-10-CM: H08.784 ICD-9-CM: 288.60  11/3/2020 - Present Chronic respiratory failure with hypoxia [facility]) ICD-10-CM: J96.11 
ICD-9-CM: 518.83, 799.02  11/2/2020 - Present Overview Signed 11/2/2020 10:12 PM by Doreen Laird MD  
  On 3L NC at home Cellulitis of lower extremity ICD-10-CM: L03.119 ICD-9-CM: 682.6  3/23/2020 - Present ASO (arteriosclerosis obliterans) ICD-10-CM: I70.90 ICD-9-CM: 440.9  9/5/2019 - Present PVD (peripheral vascular disease) ([facility] ICD-10-CM: I73.9 ICD-9-CM: 443.9  8/1/2019 - Present Severe obesity ([facility] ICD-10-CM: E66.01 
ICD-9-CM: 278.01  7/30/2019 - Present COPD (chronic obstructive pulmonary disease) ([facility]) ICD-10-CM: J44.9 ICD-9-CM: 748  7/13/2019 - Present Normocytic anemia ICD-10-CM: D64.9 ICD-9-CM: 285.9  7/13/2019 - Present PAD (peripheral artery disease) ([facility]) ICD-10-CM: I73.9 ICD-9-CM: 443.9  7/13/2019 - Present Mild intermittent asthma ICD-10-CM: J45.20 ICD-9-CM: 493.90  5/17/2019 - Present Brachial artery thrombus (HCC) ICD-10-CM: Z10.8 ICD-9-CM: 444.21  4/26/2019 - Present Sleep apnea ICD-10-CM: G47.30 ICD-9-CM: 780.57  1/5/2019 - Present Overview Signed 1/5/2019  8:41 PM by Natalie De La Rosa, DO  
  wears CPAP Atrial fibrillation [facility]) ICD-10-CM: I48.91 
ICD-9-CM: 427.31  11/16/2018 - Present Obesity (BMI 30-39.9) (Chronic) ICD-10-CM: Y05.5 ICD-9-CM: 278.00  11/13/2018 - Present Recurrent deep vein thrombosis (DVT) (HCC) ICD-10-CM: I82.409 ICD-9-CM: 453.40  3/30/2018 - Present Chronic anticoagulation (Chronic) ICD-10-CM: Z79.01 
ICD-9-CM: V58.61  3/30/2018 - Present Essential hypertension (Chronic) ICD-10-CM: I10 
ICD-9-CM: 401.9  1/22/2016 - Present CAD (coronary artery disease) ICD-10-CM: I25.10 ICD-9-CM: 414.00  10/9/2015 - Present Type II diabetes mellitus (HCC) (Chronic) ICD-10-CM: E11.9 ICD-9-CM: 250.00  10/9/2015 - Present RESOLVED: Syncope and collapse ICD-10-CM: R55 
ICD-9-CM: 780.2  9/29/2020 - 11/2/2020 RESOLVED: Cellulitis ICD-10-CM: L03.90 ICD-9-CM: 682.9  3/23/2020 - 5/29/2020 RESOLVED: Hypokalemia ICD-10-CM: E87.6 ICD-9-CM: 276.8  3/23/2020 - 5/29/2020 RESOLVED: Hyponatremia ICD-10-CM: E87.1 ICD-9-CM: 276.1  3/23/2020 - 5/29/2020 RESOLVED: Diarrhea ICD-10-CM: R19.7 ICD-9-CM: 787.91  3/23/2020 - 5/29/2020 RESOLVED: Syncope and collapse ICD-10-CM: R55 
ICD-9-CM: 780.2  7/13/2019 - 5/29/2020 RESOLVED: UTI (urinary tract infection) ICD-10-CM: N39.0 ICD-9-CM: 599.0  7/13/2019 - 11/2/2020 RESOLVED: Sepsis ([address] ICD-10-CM: A41.9 ICD-9-CM: 038.9, 995.91  7/13/2019 - 11/3/2020 RESOLVED: Leg wound, left ICD-10-CM: X85.568I ICD-9-CM: 891.0  7/13/2019 - 5/29/2020 RESOLVED: Leg laceration, right, initial encounter ICD-10-CM: H45.503D ICD-9-CM: 894.0  7/13/2019 - 5/29/2020 RESOLVED: Cellulitis of right upper arm ICD-10-CM: L03.113 ICD-9-CM: 682.3  5/17/2019 - 5/29/2020 RESOLVED: Gangrene of finger of right hand ([facility] ICD-10-CM: L56 
ICD-9-CM: 785.4  5/17/2019 - 11/2/2020 RESOLVED: Bowel obstruction ([facility] ICD-10-CM: Z20.764 ICD-9-CM: 560.9  1/8/2019 - 5/29/2020 RESOLVED: Partial small bowel obstruction ([facility] ICD-10-CM: K56.600 ICD-9-CM: 560.9  1/5/2019 - 5/29/2020 RESOLVED: Dyspnea ICD-10-CM: R06.00 
ICD-9-CM: 786.09  11/13/2018 - 5/29/2020 RESOLVED: ARF (acute renal failure) (HCC) ICD-10-CM: N17.9 ICD-9-CM: 584.9  11/13/2018 - 5/29/2020 RESOLVED: Closed wedge compression fracture of T7 vertebra ([facility] ICD-10-CM: Q70.995M ICD-9-CM: 805.2  11/13/2018 - 5/29/2020 RESOLVED: Type 2 diabetes with nephropathy ([facility] ICD-10-CM: E11.21 
ICD-9-CM: 250.40, 583.81  10/1/2018 - 11/2/2020 RESOLVED: Chest pain ICD-10-CM: R07.9 ICD-9-CM: 786.50  6/27/2018 - 5/29/2020 RESOLVED: Abdominal pain ICD-10-CM: R10.9 ICD-9-CM: 789.00  6/27/2018 - 5/29/2020 RESOLVED: Coronary artery disease involving native coronary artery without angina pectoris (Chronic) ICD-10-CM: I25.10 ICD-9-CM: 414.01  1/22/2016 - 11/2/2020 RESOLVED: HTN (hypertension) ICD-10-CM: I10 
ICD-9-CM: 401.9  10/9/2015 - 1/22/2016 RESOLVED: NSTEMI (non-ST elevated myocardial infarction) ([facility] ICD-10-CM: I21.4 ICD-9-CM: 410.70  10/9/2015 - 5/29/2020 Subjective: 70 y.o. female with past medical history of chronic hypoxic respiratory failure (on 3 liters home oxygen), asthma, atrial fibrillation (Afib), autoimmune disease, fibromyalgia, CAD, heart failure (HFpEF), T7 vertebral compression fracture, COPD, type 2 DM, DVT, GERD, hypertension, NSTEMI, PAD, sleep apnea, obesity presented to the ED from home with chief complaints of shortness of breath (SOB) and low hemoglobin. Patient has chronic SOB but worsened. She was last hospitalized here from 1/1/2021 - 1/15/2021 with left lower lobe pneumonia, acute on chronic hypoxic respiratory failure, sepsis, GI bleed, anemia. Patient was transfused with 2 units PRBCs with hemoglobin = 4.9 on 1/1/2021. Last Hgb= 6.5 on 1/21/2021. On arrival in the ED today, initial recorded vital signs were BP = 139/47, HR= 82, RR= 22, O2sat= 94% on 4 liters O2 nasal cannula (NC). WBC = 18,400. proBNP = 1,443. Chest xray portable showed cardiomegaly, interstitial edema, and density in left lung base. Patient is now seen for admission to the hospitalist service for continued evaluation and treatments. There were no reports of new onset syncope, loss of consciousness, headache, neck pain, visual disturbance, numbness, paresthesias, focal weakness, chest pain, palpitations, abdominal pain, nausea, vomiting, diarrhea, melena, dysuria, hematuria, calf pain, increased leg swelling/ edema, fever, chills, rash, or known COVID 19 exposure. (Dr Sherryle Rattler) 1/27:  Feeling better with less shortness of breath. Hb low so needs another transfusion. No clear source of bleeding. Covid negative. GI to see. 1/28:  Feels some better except sore throat - looks like thrush. Less shortness of breath. Hb up to 7.9 this AM after 1 unit PRC. K+ sl low - replacing. 1/29:  Little change from yesterday. BP continues to be on the low side and pt reports feeling lightheaded with sitting up - Cards adjusting meds. Hb down to 7.3 - watching - will likely need another transfusion. WBC 19K - likely due to steroids - weaning. 1/30:  Reports feeling better and no longer lightheaded. Cards has adjusted meds  Afeb. Will do 1-2 more days of IV antibiotics. 1/31:  No new complaints this AM.  Cards has signed off. BP remains better. Hb at 7.0 - given her hx will transfuse today. Risks v benefits discussed. 2/1:  No new complaints. She reports she feels a little stronger. AM labs pending. 2/2:  Currently no complaints. Desats with even smallest amt of exercise and O2 placed back on 5 liters NC. Pul continues to see. Cards seeing again. Hb 8.1 yesterday - AM Hb pending. 2/3:  Resting comfortably in bed. No complaints. O2 down to 4 liters. CXR yesterday continued to show Pul Edema - more diuresis needed. 2/4:  No complaints. Feels less short of breath as long as she does not \"do too much. \"  Lasix IV given by Cards - pt reports good diuresis but I/O shows little out. Still on 4 liters - try to wean down to her usual 3 liters O2. AM labs pending. 2/5:  No complaints. O2 down to 3 liters. Stable for SNF rehab when they can take her. 2/6:  Feeling better and less short of breath. Screening Covid testing is pending for SNF rehab. 2/7:  Continues to feel better with less shortness of breath. Screening Covid pending for SNF rehab. Review of Systems: A comprehensive review of systems was negative except for that written in the HPI. Objective:  
Physical Exam:  
Visit Vitals /60 (BP 1 Location: Right upper arm, BP Patient Position: At rest) Pulse 82 Temp 97.7 °F (36.5 °C) Resp 17 Ht 5' 7\" (1.702 m) Wt 111 kg (244 lb 11.4 oz) SpO2 95% BMI 38.33 kg/m² O2 Flow Rate (L/min): 3 l/min O2 Device: Nasal cannula Temp (24hrs), Av.1 °F (36.7 °C), Min:97.7 °F (36.5 °C), Max:98.7 °F (37.1 °C) No intake/output data recorded.  07 -  1900 In: 720 [P.O.:720] Out: 1600 [Urine:1600] General:  Alert, obese, cooperative, no distress, appears stated age. Head:  Normocephalic, without obvious abnormality, atraumatic. Eyes:  Conjunctivae/corneas clear. EOMs intact. Throat: Lips, mucosa, and tongue moist; throat red with a few white patches on tongue. Neck: Supple, symmetrical, trachea midline, no adenopathy, thyroid: no enlargement/tenderness/nodules, no carotid bruit and no JVD. Lungs:   Clear to auscultation bilaterally. Chest wall:  No tenderness or deformity. Heart:  irreg with rate in 14C, no murmur, click, rub or gallop. Abdomen:   Soft, non-tender. Bowel sounds normal. No masses,  No organomegaly. Extremities: no cyanosis. 1+ LE edema on top of chronic stasis changes. No calf tenderness or cords. Pulses: 2+ and symmetric all extremities. Skin:  turgor normal. No rashes Neurologic:   Alert and oriented X 3.  equal.  No cogwheeling or rigidity. Gait not tested at this time. Sensation grossly normal to touch. Gross motor of extremities normal except generalized weakness. Data Review:  
ECHO 21 Interpretation Summary Result status: Final result · Pericardium: There is a moderate left pleural effusion. · LV: Estimated LVEF is 60 - 65%. Normal cavity size, wall thickness and systolic function (ejection fraction normal). · TV: Mild tricuspid valve regurgitation is present. Portable chest single view dated 2021 Comparison chest dated 2021 History is increasing O2 needs A single portable AP upright view of the chest was obtained. The cardiac 
silhouette continues to be enlarged.  There continues be opacification of the 
 left lung base and a few air bronchograms are noted in the left hilar/parahilar 
region. This is suggestive of developing atelectasis/infiltration. There is some 
atelectatic change in the mid/upper portion of the left lung. Some hazy density 
is associated with the right lung. This is suggestive of developing 
infiltration. There is blunting of both costophrenic angles. This is compatible 
with the presence of bilateral pleural effusions. IMPRESSION 1. Continued evidence of mild pulmonary edema. 2. Presence of hazy density involving both lungs suggestive of developing 
infiltration/atelectatic change. 3. Presence of bilateral pleural effusions Recent Days: 
Recent Labs 02/07/21 
7449 02/06/21 
5383 02/05/21 
8773 WBC 15.1* 15.5* 13.4* HGB 7.8* 8.0* 7.4* HCT 27.4* 28.0* 25.7*  
* 345 393 Recent Labs 02/07/21 
2971 02/06/21 
2561 02/05/21 
5283  138 137  
K 3.9 4.0 3.5 CL 98 97 94* CO2 39* 37* 41* * 140* 184* BUN 27* 28* 26* CREA 1.26* 1.17* 1.25* CA 7.6* 8.0* 7.5* ALB 2.3* 2.3* 2.1* TBILI 0.5 0.4 0.3 ALT 26 24 20 No results for input(s): PH, PCO2, PO2, HCO3, FIO2 in the last 72 hours. 24 Hour Results: 
Recent Results (from the past 24 hour(s)) GLUCOSE, POC Collection Time: 02/06/21  8:25 AM  
Result Value Ref Range Glucose (POC) 121 (H) 65 - 100 mg/dL Performed by Lianne Briones (CON) SARS-COV-2 Collection Time: 02/06/21  8:30 AM  
Result Value Ref Range SARS-CoV-2 Please find results under separate order GLUCOSE, POC Collection Time: 02/06/21 11:44 AM  
Result Value Ref Range Glucose (POC) 155 (H) 65 - 100 mg/dL Performed by Alka Salmeron (CON) GLUCOSE, POC Collection Time: 02/06/21  3:52 PM  
Result Value Ref Range Glucose (POC) 331 (H) 65 - 100 mg/dL Performed by Lianne Briones (CON) GLUCOSE, POC Collection Time: 02/06/21  9:06 PM  
Result Value Ref Range Glucose (POC) 283 (H) 65 - 100 mg/dL Performed by Juan David Love (PCT) CBC WITH AUTOMATED DIFF Collection Time: 02/07/21  4:32 AM  
Result Value Ref Range WBC 15.1 (H) 3.6 - 11.0 K/uL  
 RBC 2.82 (L) 3.80 - 5.20 M/uL HGB 7.8 (L) 11.5 - 16.0 g/dL HCT 27.4 (L) 35.0 - 47.0 % MCV 97.2 80.0 - 99.0 FL  
 MCH 27.7 26.0 - 34.0 PG  
 MCHC 28.5 (L) 30.0 - 36.5 g/dL  
 RDW 16.1 (H) 11.5 - 14.5 % PLATELET 551 (H) 403 - 400 K/uL MPV 9.9 8.9 - 12.9 FL  
 NRBC 0.0 0  WBC ABSOLUTE NRBC 0.00 0.00 - 0.01 K/uL NEUTROPHILS 81 (H) 32 - 75 % LYMPHOCYTES 9 (L) 12 - 49 % MONOCYTES 6 5 - 13 % EOSINOPHILS 2 0 - 7 % BASOPHILS 1 0 - 1 % IMMATURE GRANULOCYTES 1 (H) 0.0 - 0.5 % ABS. NEUTROPHILS 12.1 (H) 1.8 - 8.0 K/UL  
 ABS. LYMPHOCYTES 1.4 0.8 - 3.5 K/UL  
 ABS. MONOCYTES 0.9 0.0 - 1.0 K/UL  
 ABS. EOSINOPHILS 0.3 0.0 - 0.4 K/UL  
 ABS. BASOPHILS 0.2 (H) 0.0 - 0.1 K/UL  
 ABS. IMM. GRANS. 0.2 (H) 0.00 - 0.04 K/UL  
 DF SMEAR SCANNED    
 RBC COMMENTS HYPOCHROMIA PRESENT 
    
 RBC COMMENTS ANISOCYTOSIS 1+ 
    
 RBC COMMENTS MACROCYTOSIS 
PRESENT 
    
 RBC COMMENTS MICROCYTOSIS 
PRESENT 
    
METABOLIC PANEL, COMPREHENSIVE Collection Time: 02/07/21  4:32 AM  
Result Value Ref Range Sodium 136 136 - 145 mmol/L Potassium 3.9 3.5 - 5.1 mmol/L Chloride 98 97 - 108 mmol/L  
 CO2 39 (H) 21 - 32 mmol/L Anion gap NEG 1 5 - 15 mmol/L Glucose 126 (H) 65 - 100 mg/dL BUN 27 (H) 6 - 20 MG/DL Creatinine 1.26 (H) 0.55 - 1.02 MG/DL  
 BUN/Creatinine ratio 21 (H) 12 - 20 GFR est AA 50 (L) >60 ml/min/1.73m2 GFR est non-AA 42 (L) >60 ml/min/1.73m2 Calcium 7.6 (L) 8.5 - 10.1 MG/DL Bilirubin, total 0.5 0.2 - 1.0 MG/DL  
 ALT (SGPT) 26 12 - 78 U/L  
 AST (SGOT) 26 15 - 37 U/L Alk. phosphatase 234 (H) 45 - 117 U/L Protein, total 5.7 (L) 6.4 - 8.2 g/dL Albumin 2.3 (L) 3.5 - 5.0 g/dL Globulin 3.4 2.0 - 4.0 g/dL A-G Ratio 0.7 (L) 1.1 - 2.2 GLUCOSE, POC  
 Collection Time: 02/07/21  6:49 AM  
Result Value Ref Range Glucose (POC) 129 (H) 65 - 100 mg/dL Performed by Darlin Lovell (PCT) Medications reviewed Current Facility-Administered Medications Medication Dose Route Frequency  folic acid (FOLVITE) tablet 1 mg  1 mg Oral DAILY  furosemide (LASIX) tablet 40 mg  40 mg Oral DAILY  dilTIAZem ER (CARDIZEM CD) capsule 300 mg  300 mg Oral DAILY  albuterol-ipratropium (DUO-NEB) 2.5 MG-0.5 MG/3 ML  3 mL Nebulization Q6HWA RT  
 albuterol-ipratropium (DUO-NEB) 2.5 MG-0.5 MG/3 ML  3 mL Nebulization Q4H PRN  
 0.9% sodium chloride infusion 250 mL  250 mL IntraVENous PRN  predniSONE (DELTASONE) tablet 20 mg  20 mg Oral DAILY WITH BREAKFAST  nystatin (MYCOSTATIN) 100,000 unit/mL oral suspension 500,000 Units  500,000 Units Oral QID  potassium chloride SR (KLOR-CON 10) tablet 20 mEq  20 mEq Oral BID  
 amiodarone (CORDARONE) tablet 200 mg  200 mg Oral DAILY  glucose chewable tablet 16 g  4 Tab Oral PRN  
 dextrose (D50W) injection syrg 12.5-25 g  25-50 mL IntraVENous PRN  
 glucagon (GLUCAGEN) injection 1 mg  1 mg IntraMUSCular PRN  
 insulin lispro (HUMALOG) injection   SubCUTAneous AC&HS  
 0.9% sodium chloride infusion 250 mL  250 mL IntraVENous PRN  
 arformoteroL (BROVANA) neb solution 15 mcg  15 mcg Nebulization BID RT  
 ondansetron (ZOFRAN) injection 4 mg  4 mg IntraVENous Q4H PRN  
 lipase-protease-amylase (CREON 24,000) capsule 2 Cap  2 Cap Oral TID WITH MEALS  
 apixaban (ELIQUIS) tablet 5 mg  5 mg Oral BID  cholecalciferol (VITAMIN D3) (1000 Units /25 mcg) tablet 2 Tab  2,000 Units Oral DAILY  cyanocobalamin (VITAMIN B12) tablet 1,000 mcg  1,000 mcg Oral DAILY  DULoxetine (CYMBALTA) capsule 60 mg  60 mg Oral DAILY  ferrous sulfate tablet 325 mg  325 mg Oral DAILY WITH BREAKFAST  mirtazapine (REMERON) tablet 15 mg  15 mg Oral QHS  montelukast (SINGULAIR) tablet 10 mg  10 mg Oral QPM  
  pantoprazole (PROTONIX) tablet 40 mg  40 mg Oral ACB  sodium chloride (NS) flush 5-40 mL  5-40 mL IntraVENous Q8H  
 sodium chloride (NS) flush 5-40 mL  5-40 mL IntraVENous PRN  
 acetaminophen (TYLENOL) tablet 650 mg  650 mg Oral Q6H PRN Or  
 acetaminophen (TYLENOL) suppository 650 mg  650 mg Rectal Q6H PRN  polyethylene glycol (MIRALAX) packet 17 g  17 g Oral DAILY PRN  
 guaiFENesin-dextromethorphan (ROBITUSSIN DM) 100-10 mg/5 mL syrup 5 mL  5 mL Oral Q4H PRN Care Plan discussed with: Patient/GI/Cardiology/Pul and Nurse Total time spent with patient: 30 minutes.  
Brigette Rodriguez MD

## 2021-02-07 NOTE — PROGRESS NOTES
Bedside and Verbal shift change report given to [facility] (oncoming nurse) by Garett Rizzo RN (offgoing nurse). Report included the following information SBAR, Kardex, Intake/Output, MAR, Accordion and Recent Results.

## 2021-02-08 NOTE — PROGRESS NOTES
2/8/2021   CARE MANAGEMENT NOTE:  CM reviewed EMR for clinical updates and handoff received from ER  Anish ORNELAS.  Pt is a READMISSION. Phyllis Castanon was admitted to [facility] from 1/1/21 - 1/15/21 with PNA, and acute respiratory failure.  She discharged to [address] [place] [address] Nw 1/26/21, pt was readmitted with PNA, CHF, anemia, and HTN.   
Reportedly, pt lived alone prior to admit to [address] [place] [address] for SNF level of rehab.   
Dtr Nilda Wray ([phone]) is her primary family contact. 
  
RUR 52%; WPN 03 KGQO  
  
Transition Plan of Care: 1.  Plan is for pt to return to [facility] for continued rehab. Updated clinicals were faxed for review and CM left a vm message with their admissions' office re: bed availability today. MD, please order PT and OT evals to assess pt's current level of functioning. 2.  Long term goal is to relocate into an assisted living facility following rehab 3.  COVID 19 test for placement was negative on 2/6 and Rapid test was completed on 2/7 4.  Cardiac bundle pt 5.  Dtr to transport pt with portable oxygen tank vs [facility] at discharge 
  
CM will continue to follow pt for SNF placement. Jeanette

## 2021-02-08 NOTE — PROGRESS NOTES
Daily Progress Note and Discharge Note: 2/8/2021 Darrin Perkins MD 
 
Assessment/Plan: 1. Pneumonia -  IV antibiotics Azithromycin 500 mg IV and Ceftriaxone 1 gram IV completed -  She will need follow up CXR in 2-4 wks 2. Shortness of breath (SOB) - with history of chronic hypoxic respiratory failure on 3 liters chronically 
     - continue supplemental oxygen 
  
3. Acute on chronic diastolic CHF (HFpEF) - monitor fluid status closely 
     - ECHO results as under Data Review - little changed cardiac function 
     - cardiology consulted and managed CHF and fluid status 
     - monitor electrolytes frequently at SNF 
     - monitor I/O at SNF, check pro-BNP at SNF 
  
4.  Negative for COVID 19 virus infection 
     - COVID 19 test is negative at admission 
     - screening tests 2/6 and 2/7 negative 
  
5.  Leukocytosis 
     - monitor at SNF, antibiotics completed as above 
  
6. Anemia 
     - monitor and transfuse for hemoglobin < 7.0. 
     - monitor frequently at [facility] rehab - Eliquis restarted per GI and Card 
  
7. Uncontrolled type 2 DM with hyperglycemia 
     - SSI inpt 
     - resume outpt tx when discharged and monitor for need for med adjustment at SNF 
  
8. Acute superimposed on chronic kidney disease stage 3:  improved 
     - monitor at SNF and tx as needed 9. Elevated alkaline phosphatase - improved 
     - monitor at SNF and treat as needed 
  
10. Hypertension 
       - hold on home Lisinopril - HCTZ with elevated creatinine and soft BPs - restart if needed. - On Amlodipine per cards - Dilt/Lasix per Cards 
  
11. History of GI bleed - s/p upper GI endoscopy and colonoscopy on 1/4/2021 with Adenomatous polyp of transverse colon [D12.3] Candida esophagitis ([facility] [address] [B37.81] Gastritis and duodenitis [  
  - GI consulted 12. Obesity - recommended weight loss, heart healthy diet, and lifestyle modification 13. GERD 
       - Protonix 
  
14. Paroxysmal atrial fibrillation (afib) 
       - on long term anticoagulation on Eliquis - hold if further bleeding 
       - cleared to restart Eliquis tx per GI/Cardiology 
  
CODE STATUS:  FULL CODE as discussed with patient who requests the same. Problem List: 
Problem List as of 2/8/2021 Date Reviewed: 11/2/2020 Codes Class Noted - Resolved Pneumonia ICD-10-CM: J18.9 ICD-9-CM: 306  1/26/2021 - Present SOB (shortness of breath) ICD-10-CM: R06.02 
ICD-9-CM: 786.05  1/26/2021 - Present Anemia ICD-10-CM: D64.9 ICD-9-CM: 285.9  1/26/2021 - Present Acute on chronic systolic CHF (congestive heart failure) (HCC) ICD-10-CM: Y57.60 ICD-9-CM: 428.23, 428.0  1/26/2021 - Present Uncontrolled type 2 diabetes mellitus with hyperglycemia (HCC) ICD-10-CM: E11.65 ICD-9-CM: 250.02  1/26/2021 - Present Left lower lobe pneumonia ICD-10-CM: J18.9 ICD-9-CM: 899  1/1/2021 - Present Leukocytosis ICD-10-CM: Y04.300 ICD-9-CM: 288.60  11/3/2020 - Present Chronic respiratory failure with hypoxia [facility]) ICD-10-CM: J96.11 
ICD-9-CM: 518.83, 799.02  11/2/2020 - Present Overview Signed 11/2/2020 10:12 PM by Summer Beavers MD  
  On 3L NC at home Cellulitis of lower extremity ICD-10-CM: L03.119 ICD-9-CM: 682.6  3/23/2020 - Present ASO (arteriosclerosis obliterans) ICD-10-CM: I70.90 ICD-9-CM: 440.9  9/5/2019 - Present PVD (peripheral vascular disease) ([facility] [address] ICD-10-CM: I73.9 ICD-9-CM: 443.9  8/1/2019 - Present Severe obesity ([facility] [address] ICD-10-CM: E66.01 
ICD-9-CM: 278.01  7/30/2019 - Present COPD (chronic obstructive pulmonary disease) (HCC) ICD-10-CM: J44.9 ICD-9-CM: 733  7/13/2019 - Present Normocytic anemia ICD-10-CM: D64.9 ICD-9-CM: 285.9  7/13/2019 - Present  PAD (peripheral artery disease) (HCC) ICD-10-CM: I73.9 
ICD-9-CM: 443.9  7/13/2019 - Present  
   
 Mild intermittent asthma ICD-10-CM: J45.20 
ICD-9-CM: 493.90  5/17/2019 - Present  
   
 Brachial artery thrombus (HCC) ICD-10-CM: I74.2 
ICD-9-CM: 444.21  4/26/2019 - Present  
   
 Sleep apnea ICD-10-CM: G47.30 
ICD-9-CM: 780.57  1/5/2019 - Present  
 Overview Signed 1/5/2019  8:41 PM by Franklin Solaon Jr, DO  
  wears CPAP 
  
  
   
 Atrial fibrillation (HCC) ICD-10-CM: I48.91 
ICD-9-CM: 427.31  11/16/2018 - Present  
   
 Obesity (BMI 30-39.9) (Chronic) ICD-10-CM: E66.9 
ICD-9-CM: 278.00  11/13/2018 - Present  
   
 Recurrent deep vein thrombosis (DVT) (HCC) ICD-10-CM: I82.409 
ICD-9-CM: 453.40  3/30/2018 - Present  
   
 Chronic anticoagulation (Chronic) ICD-10-CM: Z79.01 
ICD-9-CM: V58.61  3/30/2018 - Present  
   
 Essential hypertension (Chronic) ICD-10-CM: I10 
ICD-9-CM: 401.9  1/22/2016 - Present  
   
 CAD (coronary artery disease) ICD-10-CM: I25.10 
ICD-9-CM: 414.00  10/9/2015 - Present  
   
 Type II diabetes mellitus (HCC) (Chronic) ICD-10-CM: E11.9 
ICD-9-CM: 250.00  10/9/2015 - Present  
   
 RESOLVED: Syncope and collapse ICD-10-CM: R55 
ICD-9-CM: 780.2  9/29/2020 - 11/2/2020  
   
 RESOLVED: Cellulitis ICD-10-CM: L03.90 
ICD-9-CM: 682.9  3/23/2020 - 5/29/2020  
   
 RESOLVED: Hypokalemia ICD-10-CM: E87.6 
ICD-9-CM: 276.8  3/23/2020 - 5/29/2020  
   
 RESOLVED: Hyponatremia ICD-10-CM: E87.1 
ICD-9-CM: 276.1  3/23/2020 - 5/29/2020  
   
 RESOLVED: Diarrhea ICD-10-CM: R19.7 
ICD-9-CM: 787.91  3/23/2020 - 5/29/2020  
   
 RESOLVED: Syncope and collapse ICD-10-CM: R55 
ICD-9-CM: 780.2  7/13/2019 - 5/29/2020  
   
 RESOLVED: UTI (urinary tract infection) ICD-10-CM: N39.0 
ICD-9-CM: 599.0  7/13/2019 - 11/2/2020  
   
 RESOLVED: Sepsis (HCC) ICD-10-CM: A41.9 
ICD-9-CM: 038.9, 995.91  7/13/2019 - 11/3/2020  
   
 RESOLVED: Leg wound, left ICD-10-CM: S81.802A 
ICD-9-CM: 891.0  7/13/2019 - 5/29/2020  
   
 RESOLVED: Leg laceration, right, initial encounter ICD-10-CM: V46.743W ICD-9-CM: 894.0  7/13/2019 - 5/29/2020 RESOLVED: Cellulitis of right upper arm ICD-10-CM: L03.113 ICD-9-CM: 682.3  5/17/2019 - 5/29/2020 RESOLVED: Gangrene of finger of right hand ([facility] ICD-10-CM: K60 
ICD-9-CM: 785.4  5/17/2019 - 11/2/2020 RESOLVED: Bowel obstruction ([facility] ICD-10-CM: C66.832 ICD-9-CM: 560.9  1/8/2019 - 5/29/2020 RESOLVED: Partial small bowel obstruction ([facility] ICD-10-CM: K56.600 ICD-9-CM: 560.9  1/5/2019 - 5/29/2020 RESOLVED: Dyspnea ICD-10-CM: R06.00 
ICD-9-CM: 786.09  11/13/2018 - 5/29/2020 RESOLVED: ARF (acute renal failure) (HCC) ICD-10-CM: N17.9 ICD-9-CM: 584.9  11/13/2018 - 5/29/2020 RESOLVED: Closed wedge compression fracture of T7 vertebra ([facility] ICD-10-CM: L36.485H ICD-9-CM: 805.2  11/13/2018 - 5/29/2020 RESOLVED: Type 2 diabetes with nephropathy ([facility] ICD-10-CM: E11.21 
ICD-9-CM: 250.40, 583.81  10/1/2018 - 11/2/2020 RESOLVED: Chest pain ICD-10-CM: R07.9 ICD-9-CM: 786.50  6/27/2018 - 5/29/2020 RESOLVED: Abdominal pain ICD-10-CM: R10.9 ICD-9-CM: 789.00  6/27/2018 - 5/29/2020 RESOLVED: Coronary artery disease involving native coronary artery without angina pectoris (Chronic) ICD-10-CM: I25.10 ICD-9-CM: 414.01  1/22/2016 - 11/2/2020 RESOLVED: HTN (hypertension) ICD-10-CM: I10 
ICD-9-CM: 401.9  10/9/2015 - 1/22/2016 RESOLVED: NSTEMI (non-ST elevated myocardial infarction) [facility] ICD-10-CM: I21.4 ICD-9-CM: 410.70  10/9/2015 - 5/29/2020 Subjective: 70 y.o. female with past medical history of chronic hypoxic respiratory failure (on 3 liters home oxygen), asthma, atrial fibrillation (Afib), autoimmune disease, fibromyalgia, CAD, heart failure (HFpEF), T7 vertebral compression fracture, COPD, type 2 DM, DVT, GERD, hypertension, NSTEMI, PAD, sleep apnea, obesity presented to the ED from home with chief complaints of shortness of breath (SOB) and low hemoglobin. Patient has chronic SOB but worsened. She was last hospitalized here from 1/1/2021 - 1/15/2021 with left lower lobe pneumonia, acute on chronic hypoxic respiratory failure, sepsis, GI bleed, anemia. Patient was transfused with 2 units PRBCs with hemoglobin = 4.9 on 1/1/2021. Last Hgb= 6.5 on 1/21/2021. On arrival in the ED today, initial recorded vital signs were BP = 139/47, HR= 82, RR= 22, O2sat= 94% on 4 liters O2 nasal cannula (NC). WBC = 18,400. proBNP = 1,443. Chest xray portable showed cardiomegaly, interstitial edema, and density in left lung base. Patient is now seen for admission to the hospitalist service for continued evaluation and treatments. There were no reports of new onset syncope, loss of consciousness, headache, neck pain, visual disturbance, numbness, paresthesias, focal weakness, chest pain, palpitations, abdominal pain, nausea, vomiting, diarrhea, melena, dysuria, hematuria, calf pain, increased leg swelling/ edema, fever, chills, rash, or known COVID 19 exposure. (Dr Ziggy Lamas) 1/27:  Feeling better with less shortness of breath. Hb low so needs another transfusion. No clear source of bleeding. Covid negative. GI to see. 1/28:  Feels some better except sore throat - looks like thrush. Less shortness of breath. Hb up to 7.9 this AM after 1 unit PRC. K+ sl low - replacing. 1/29:  Little change from yesterday. BP continues to be on the low side and pt reports feeling lightheaded with sitting up - Cards adjusting meds. Hb down to 7.3 - watching - will likely need another transfusion. WBC 19K - likely due to steroids - weaning. 1/30:  Reports feeling better and no longer lightheaded. Cards has adjusted meds  Afeb. Will do 1-2 more days of IV antibiotics. 1/31:  No new complaints this AM.  Cards has signed off. BP remains better. Hb at 7.0 - given her hx will transfuse today. Risks v benefits discussed. 2/1:  No new complaints. She reports she feels a little stronger. AM labs pending. 2/2:  Currently no complaints. Desats with even smallest amt of exercise and O2 placed back on 5 liters NC. Pul continues to see. Cards seeing again. Hb 8.1 yesterday - AM Hb pending. 2/3:  Resting comfortably in bed. No complaints. O2 down to 4 liters. CXR yesterday continued to show Pul Edema - more diuresis needed. 2/4:  No complaints. Feels less short of breath as long as she does not \"do too much. \"  Lasix IV given by Cards - pt reports good diuresis but I/O shows little out. Still on 4 liters - try to wean down to her usual 3 liters O2. AM labs pending. 2/5:  No complaints. O2 down to 3 liters. Stable for SNF rehab when they can take her. 2/6:  Feeling better and less short of breath. Screening Covid testing is pending for SNF rehab. 2/7:  Continues to feel better with less shortness of breath. Screening Covid pending for SNF rehab. 2/8:  Feels better with less shortness of breath. Screening Covid tests neg 7/6 and 7/7. Stable for SNF rehab. Labs should be followed closely at rehab. Follow up with PCP when out of rehab. Follow up with Cardiology as they direct. Review of Systems: A comprehensive review of systems was negative except for that written in the HPI. Objective:  
Physical Exam:  
Visit Vitals BP (!) 117/58 (BP 1 Location: Right upper arm, BP Patient Position: At rest) Pulse 85 Temp 97.7 °F (36.5 °C) Resp 19 Ht 5' 7\" (1.702 m) Wt 115.2 kg (254 lb) SpO2 98% BMI 39.78 kg/m² O2 Flow Rate (L/min): 3 l/min O2 Device: Nasal cannula Temp (24hrs), Av.2 °F (36.8 °C), Min:97.7 °F (36.5 °C), Max:98.9 °F (37.2 °C) 
  1901 -  0700 In: 300 [P.O.:300] Out: -     3133 -  1900 In: 720 [P.O.:720] Out: 1700 [Urine:1700] General:  Alert, obese, cooperative, no distress, appears stated age. Head:  Normocephalic, without obvious abnormality, atraumatic. Eyes:  Conjunctivae/corneas clear. EOMs intact. Throat: Lips, mucosa, and tongue moist; throat red with a few white patches on tongue. Neck: Supple, symmetrical, trachea midline, no adenopathy, thyroid: no enlargement/tenderness/nodules, no carotid bruit and no JVD. Lungs:   Clear to auscultation bilaterally. Chest wall:  No tenderness or deformity. Heart:  irreg with rate in 92N, no murmur, click, rub or gallop. Abdomen:   Soft, non-tender. Bowel sounds normal. No masses,  No organomegaly. Extremities: no cyanosis. 1+ LE edema on top of chronic stasis changes. No calf tenderness or cords. Pulses: 2+ and symmetric all extremities. Skin:  turgor normal. No rashes Neurologic:   Alert and oriented X 3.  equal.  No cogwheeling or rigidity. Gait not tested at this time. Sensation grossly normal to touch. Gross motor of extremities normal except generalized weakness. Data Review:  
ECHO 21 Interpretation Summary Result status: Final result · Pericardium: There is a moderate left pleural effusion. · LV: Estimated LVEF is 60 - 65%. Normal cavity size, wall thickness and systolic function (ejection fraction normal). · TV: Mild tricuspid valve regurgitation is present. Portable chest single view dated 2021 Comparison chest dated 2021 History is increasing O2 needs A single portable AP upright view of the chest was obtained. The cardiac 
silhouette continues to be enlarged. There continues be opacification of the 
left lung base and a few air bronchograms are noted in the left hilar/parahilar 
region. This is suggestive of developing atelectasis/infiltration. There is some 
atelectatic change in the mid/upper portion of the left lung. Some hazy density 
is associated with the right lung. This is suggestive of developing 
infiltration. There is blunting of both costophrenic angles. This is compatible 
with the presence of bilateral pleural effusions. IMPRESSION 1. Continued evidence of mild pulmonary edema. 2. Presence of hazy density involving both lungs suggestive of developing 
infiltration/atelectatic change. 3. Presence of bilateral pleural effusions Recent Days: 
Recent Labs 02/07/21 
8393 02/06/21 
4582 WBC 15.1* 15.5* HGB 7.8* 8.0*  
HCT 27.4* 28.0*  
* 345 Recent Labs 02/07/21 
6793 02/06/21 
6712  138  
K 3.9 4.0  
CL 98 97 CO2 39* 37* * 140* BUN 27* 28* CREA 1.26* 1.17* CA 7.6* 8.0* ALB 2.3* 2.3* TBILI 0.5 0.4 ALT 26 24 No results for input(s): PH, PCO2, PO2, HCO3, FIO2 in the last 72 hours. 24 Hour Results: 
Recent Results (from the past 24 hour(s)) GLUCOSE, POC Collection Time: 02/07/21  6:49 AM  
Result Value Ref Range Glucose (POC) 129 (H) 65 - 100 mg/dL Performed by Kiana Shah (PCT) GLUCOSE, POC Collection Time: 02/07/21 11:26 AM  
Result Value Ref Range Glucose (POC) 173 (H) 65 - 100 mg/dL Performed by Estela Dunbar (CON) SARS-COV-2 Collection Time: 02/07/21 12:50 PM  
Result Value Ref Range SARS-CoV-2 Please find results under separate order COVID-19 RAPID TEST Collection Time: 02/07/21 12:50 PM  
Result Value Ref Range Specimen source Nasopharyngeal    
 COVID-19 rapid test Not detected NOTD    
GLUCOSE, POC  Collection Time: 02/07/21  4:33 PM  
 Result Value Ref Range Glucose (POC) 336 (H) 65 - 100 mg/dL Performed by Enoc Nuñez. (CON) GLUCOSE, POC Collection Time: 02/07/21  8:53 PM  
Result Value Ref Range Glucose (POC) 296 (H) 65 - 100 mg/dL Performed by Herbie Wong (PCT) Medications reviewed Current Facility-Administered Medications Medication Dose Route Frequency  folic acid (FOLVITE) tablet 1 mg  1 mg Oral DAILY  furosemide (LASIX) tablet 40 mg  40 mg Oral DAILY  dilTIAZem ER (CARDIZEM CD) capsule 300 mg  300 mg Oral DAILY  albuterol-ipratropium (DUO-NEB) 2.5 MG-0.5 MG/3 ML  3 mL Nebulization Q6HWA RT  
 albuterol-ipratropium (DUO-NEB) 2.5 MG-0.5 MG/3 ML  3 mL Nebulization Q4H PRN  
 0.9% sodium chloride infusion 250 mL  250 mL IntraVENous PRN  predniSONE (DELTASONE) tablet 20 mg  20 mg Oral DAILY WITH BREAKFAST  nystatin (MYCOSTATIN) 100,000 unit/mL oral suspension 500,000 Units  500,000 Units Oral QID  potassium chloride SR (KLOR-CON 10) tablet 20 mEq  20 mEq Oral BID  
 amiodarone (CORDARONE) tablet 200 mg  200 mg Oral DAILY  glucose chewable tablet 16 g  4 Tab Oral PRN  
 dextrose (D50W) injection syrg 12.5-25 g  25-50 mL IntraVENous PRN  
 glucagon (GLUCAGEN) injection 1 mg  1 mg IntraMUSCular PRN  
 insulin lispro (HUMALOG) injection   SubCUTAneous AC&HS  
 0.9% sodium chloride infusion 250 mL  250 mL IntraVENous PRN  
 arformoteroL (BROVANA) neb solution 15 mcg  15 mcg Nebulization BID RT  
 ondansetron (ZOFRAN) injection 4 mg  4 mg IntraVENous Q4H PRN  
 lipase-protease-amylase (CREON 24,000) capsule 2 Cap  2 Cap Oral TID WITH MEALS  
 apixaban (ELIQUIS) tablet 5 mg  5 mg Oral BID  cholecalciferol (VITAMIN D3) (1000 Units /25 mcg) tablet 2 Tab  2,000 Units Oral DAILY  cyanocobalamin (VITAMIN B12) tablet 1,000 mcg  1,000 mcg Oral DAILY  DULoxetine (CYMBALTA) capsule 60 mg  60 mg Oral DAILY  ferrous sulfate tablet 325 mg  325 mg Oral DAILY WITH BREAKFAST  mirtazapine (REMERON) tablet 15 mg  15 mg Oral QHS  montelukast (SINGULAIR) tablet 10 mg  10 mg Oral QPM  
 pantoprazole (PROTONIX) tablet 40 mg  40 mg Oral ACB  sodium chloride (NS) flush 5-40 mL  5-40 mL IntraVENous Q8H  
 sodium chloride (NS) flush 5-40 mL  5-40 mL IntraVENous PRN  
 acetaminophen (TYLENOL) tablet 650 mg  650 mg Oral Q6H PRN Or  
 acetaminophen (TYLENOL) suppository 650 mg  650 mg Rectal Q6H PRN  polyethylene glycol (MIRALAX) packet 17 g  17 g Oral DAILY PRN  
 guaiFENesin-dextromethorphan (ROBITUSSIN DM) 100-10 mg/5 mL syrup 5 mL  5 mL Oral Q4H PRN Care Plan discussed with: Patient/GI/Cardiology/Pul and Nurse Total time spent with patient: 30 minutes.  
Sadie Stubbs MD

## 2021-02-08 NOTE — PROGRESS NOTES
Bedside and Verbal shift change report given to [facility] (oncoming nurse) by Alistair Larsen RN (offgoing nurse). Report included the following information SBAR, Kardex, Intake/Output, MAR, Accordion and Recent Results.

## 2021-02-08 NOTE — PROGRESS NOTES
0700: Bedside and Verbal shift change report given to  Meka Chang RN (oncoming nurse) by Saray Polanco RN (offgoing nurse). Report included the following information SBAR, Kardex, Intake/Output, MAR, Accordion, Recent Results and Med Rec Status.

## 2021-02-09 NOTE — PROGRESS NOTES
Daily Progress Note and Discharge Note: 2/9/2021 Denise Moreno MD 
 
Assessment/Plan: 1. Pneumonia -  IV antibiotics Azithromycin 500 mg IV and Ceftriaxone 1 gram IV completed -  She will need follow up CXR in 2-4 wks 2. Shortness of breath (SOB) - with history of chronic hypoxic respiratory failure on 3 liters chronically 
     - continue supplemental oxygen 
  
3. Acute on chronic diastolic CHF (HFpEF) - monitor fluid status closely 
     - ECHO results as under Data Review - little changed cardiac function 
     - cardiology consulted and managed CHF and fluid status 
     - monitor electrolytes frequently at SNF 
     - monitor I/O at SNF, check pro-BNP at SNF 
  
4.  Negative for COVID 19 virus infection 
     - COVID 19 test is negative at admission 
     - screening tests 2/6 and 2/7 negative 
  
5.  Leukocytosis 
     - monitor at SNF, antibiotics completed as above 
  
6. Anemia 
     - monitor and transfuse for hemoglobin < 7.0. 
     - monitor frequently at [facility] rehab - Eliquis restarted per GI and Card 
  
7. Uncontrolled type 2 DM with hyperglycemia 
     - SSI inpt 
     - resume outpt tx when discharged and monitor for need for med adjustment at SNF 
  
8. Acute superimposed on chronic kidney disease stage 3:  improved 
     - monitor at SNF and tx as needed 9. Elevated alkaline phosphatase - improved 
     - monitor at SNF and treat as needed 
  
10. Hypertension 
       - hold on home Lisinopril - HCTZ with elevated creatinine and soft BPs - restart if needed. - On Amlodipine per cards - Dilt/Lasix per Cards 
  
11. History of GI bleed - s/p upper GI endoscopy and colonoscopy on 1/4/2021 with Adenomatous polyp of transverse colon [D12.3] Candida esophagitis ([facility] [address] [B37.81] Gastritis and duodenitis [  
  - GI consulted 12. Obesity - recommended weight loss, heart healthy diet, and lifestyle modification 13. GERD 
       - Protonix 
  
14. Paroxysmal atrial fibrillation (afib) 
       - on long term anticoagulation on Eliquis - hold if further bleeding 
       - cleared to restart Eliquis tx per GI/Cardiology 
  
CODE STATUS:  FULL CODE as discussed with patient who requests the same. Problem List: 
Problem List as of 2/9/2021 Date Reviewed: 11/2/2020 Codes Class Noted - Resolved Pneumonia ICD-10-CM: J18.9 ICD-9-CM: 358  1/26/2021 - Present SOB (shortness of breath) ICD-10-CM: R06.02 
ICD-9-CM: 786.05  1/26/2021 - Present Anemia ICD-10-CM: D64.9 ICD-9-CM: 285.9  1/26/2021 - Present Acute on chronic systolic CHF (congestive heart failure) (HCC) ICD-10-CM: Q45.57 ICD-9-CM: 428.23, 428.0  1/26/2021 - Present Uncontrolled type 2 diabetes mellitus with hyperglycemia (HCC) ICD-10-CM: E11.65 ICD-9-CM: 250.02  1/26/2021 - Present Left lower lobe pneumonia ICD-10-CM: J18.9 ICD-9-CM: 749  1/1/2021 - Present Leukocytosis ICD-10-CM: V48.603 ICD-9-CM: 288.60  11/3/2020 - Present Chronic respiratory failure with hypoxia [facility]) ICD-10-CM: J96.11 
ICD-9-CM: 518.83, 799.02  11/2/2020 - Present Overview Signed 11/2/2020 10:12 PM by Pema Cardenas MD  
  On 3L NC at home Cellulitis of lower extremity ICD-10-CM: L03.119 ICD-9-CM: 682.6  3/23/2020 - Present ASO (arteriosclerosis obliterans) ICD-10-CM: I70.90 ICD-9-CM: 440.9  9/5/2019 - Present PVD (peripheral vascular disease) ([facility] [address] ICD-10-CM: I73.9 ICD-9-CM: 443.9  8/1/2019 - Present Severe obesity ([facility] [address] ICD-10-CM: E66.01 
ICD-9-CM: 278.01  7/30/2019 - Present COPD (chronic obstructive pulmonary disease) (HCC) ICD-10-CM: J44.9 ICD-9-CM: 753  7/13/2019 - Present Normocytic anemia ICD-10-CM: D64.9 ICD-9-CM: 285.9  7/13/2019 - Present PAD (peripheral artery disease) (HCC) ICD-10-CM: I73.9 ICD-9-CM: 443.9  7/13/2019 - Present Mild intermittent asthma ICD-10-CM: J45.20 ICD-9-CM: 493.90  5/17/2019 - Present Brachial artery thrombus (HCC) ICD-10-CM: S30.5 ICD-9-CM: 444.21  4/26/2019 - Present Sleep apnea ICD-10-CM: G47.30 ICD-9-CM: 780.57  1/5/2019 - Present Overview Signed 1/5/2019  8:41 PM by Skip Vinay, DO  
  wears CPAP Atrial fibrillation [facility]) ICD-10-CM: I48.91 
ICD-9-CM: 427.31  11/16/2018 - Present Obesity (BMI 30-39.9) (Chronic) ICD-10-CM: V55.5 ICD-9-CM: 278.00  11/13/2018 - Present Recurrent deep vein thrombosis (DVT) (HCC) ICD-10-CM: I82.409 ICD-9-CM: 453.40  3/30/2018 - Present Chronic anticoagulation (Chronic) ICD-10-CM: Z79.01 
ICD-9-CM: V58.61  3/30/2018 - Present Essential hypertension (Chronic) ICD-10-CM: I10 
ICD-9-CM: 401.9  1/22/2016 - Present CAD (coronary artery disease) ICD-10-CM: I25.10 ICD-9-CM: 414.00  10/9/2015 - Present Type II diabetes mellitus (HCC) (Chronic) ICD-10-CM: E11.9 ICD-9-CM: 250.00  10/9/2015 - Present RESOLVED: Syncope and collapse ICD-10-CM: R55 
ICD-9-CM: 780.2  9/29/2020 - 11/2/2020 RESOLVED: Cellulitis ICD-10-CM: L03.90 ICD-9-CM: 682.9  3/23/2020 - 5/29/2020 RESOLVED: Hypokalemia ICD-10-CM: E87.6 ICD-9-CM: 276.8  3/23/2020 - 5/29/2020 RESOLVED: Hyponatremia ICD-10-CM: E87.1 ICD-9-CM: 276.1  3/23/2020 - 5/29/2020 RESOLVED: Diarrhea ICD-10-CM: R19.7 ICD-9-CM: 787.91  3/23/2020 - 5/29/2020 RESOLVED: Syncope and collapse ICD-10-CM: R55 
ICD-9-CM: 780.2  7/13/2019 - 5/29/2020 RESOLVED: UTI (urinary tract infection) ICD-10-CM: N39.0 ICD-9-CM: 599.0  7/13/2019 - 11/2/2020 RESOLVED: Sepsis ([facility] [address] ICD-10-CM: A41.9 ICD-9-CM: 038.9, 995.91  7/13/2019 - 11/3/2020 RESOLVED: Leg wound, left ICD-10-CM: L14.094G ICD-9-CM: 891.0  7/13/2019 - 5/29/2020 RESOLVED: Leg laceration, right, initial encounter ICD-10-CM: L19.555U ICD-9-CM: 894.0  7/13/2019 - 5/29/2020 RESOLVED: Cellulitis of right upper arm ICD-10-CM: L03.113 ICD-9-CM: 682.3  5/17/2019 - 5/29/2020 RESOLVED: Gangrene of finger of right hand ([facility] ICD-10-CM: E56 
ICD-9-CM: 785.4  5/17/2019 - 11/2/2020 RESOLVED: Bowel obstruction ([facility] ICD-10-CM: W23.224 ICD-9-CM: 560.9  1/8/2019 - 5/29/2020 RESOLVED: Partial small bowel obstruction ([facility] ICD-10-CM: K56.600 ICD-9-CM: 560.9  1/5/2019 - 5/29/2020 RESOLVED: Dyspnea ICD-10-CM: R06.00 
ICD-9-CM: 786.09  11/13/2018 - 5/29/2020 RESOLVED: ARF (acute renal failure) (HCC) ICD-10-CM: N17.9 ICD-9-CM: 584.9  11/13/2018 - 5/29/2020 RESOLVED: Closed wedge compression fracture of T7 vertebra ([facility] ICD-10-CM: T95.875Y ICD-9-CM: 805.2  11/13/2018 - 5/29/2020 RESOLVED: Type 2 diabetes with nephropathy ([facility] ICD-10-CM: E11.21 
ICD-9-CM: 250.40, 583.81  10/1/2018 - 11/2/2020 RESOLVED: Chest pain ICD-10-CM: R07.9 ICD-9-CM: 786.50  6/27/2018 - 5/29/2020 RESOLVED: Abdominal pain ICD-10-CM: R10.9 ICD-9-CM: 789.00  6/27/2018 - 5/29/2020 RESOLVED: Coronary artery disease involving native coronary artery without angina pectoris (Chronic) ICD-10-CM: I25.10 ICD-9-CM: 414.01  1/22/2016 - 11/2/2020 RESOLVED: HTN (hypertension) ICD-10-CM: I10 
ICD-9-CM: 401.9  10/9/2015 - 1/22/2016 RESOLVED: NSTEMI (non-ST elevated myocardial infarction) ([facility] ICD-10-CM: I21.4 ICD-9-CM: 410.70  10/9/2015 - 5/29/2020 Subjective: 70 y.o. female with past medical history of chronic hypoxic respiratory failure (on 3 liters home oxygen), asthma, atrial fibrillation (Afib), autoimmune disease, fibromyalgia, CAD, heart failure (HFpEF), T7 vertebral compression fracture, COPD, type 2 DM, DVT, GERD, hypertension, NSTEMI, PAD, sleep apnea, obesity presented to the ED from home with chief complaints of shortness of breath (SOB) and low hemoglobin. Patient has chronic SOB but worsened. She was last hospitalized here from 1/1/2021 - 1/15/2021 with left lower lobe pneumonia, acute on chronic hypoxic respiratory failure, sepsis, GI bleed, anemia. Patient was transfused with 2 units PRBCs with hemoglobin = 4.9 on 1/1/2021. Last Hgb= 6.5 on 1/21/2021. On arrival in the ED today, initial recorded vital signs were BP = 139/47, HR= 82, RR= 22, O2sat= 94% on 4 liters O2 nasal cannula (NC). WBC = 18,400. proBNP = 1,443. Chest xray portable showed cardiomegaly, interstitial edema, and density in left lung base. Patient is now seen for admission to the hospitalist service for continued evaluation and treatments. There were no reports of new onset syncope, loss of consciousness, headache, neck pain, visual disturbance, numbness, paresthesias, focal weakness, chest pain, palpitations, abdominal pain, nausea, vomiting, diarrhea, melena, dysuria, hematuria, calf pain, increased leg swelling/ edema, fever, chills, rash, or known COVID 19 exposure. (Dr Nadia Tracey) 1/27:  Feeling better with less shortness of breath. Hb low so needs another transfusion. No clear source of bleeding. Covid negative. GI to see. 1/28:  Feels some better except sore throat - looks like thrush. Less shortness of breath. Hb up to 7.9 this AM after 1 unit PRC. K+ sl low - replacing. 1/29:  Little change from yesterday. BP continues to be on the low side and pt reports feeling lightheaded with sitting up - Cards adjusting meds. Hb down to 7.3 - watching - will likely need another transfusion. WBC 19K - likely due to steroids - weaning. 1/30:  Reports feeling better and no longer lightheaded. Cards has adjusted meds  Afeb. Will do 1-2 more days of IV antibiotics. 1/31:  No new complaints this AM.  Cards has signed off. BP remains better. Hb at 7.0 - given her hx will transfuse today. Risks v benefits discussed. 2/1:  No new complaints. She reports she feels a little stronger. AM labs pending. 2/2:  Currently no complaints. Desats with even smallest amt of exercise and O2 placed back on 5 liters NC. Pul continues to see. Cards seeing again. Hb 8.1 yesterday - AM Hb pending. 2/3:  Resting comfortably in bed. No complaints. O2 down to 4 liters. CXR yesterday continued to show Pul Edema - more diuresis needed. 2/4:  No complaints. Feels less short of breath as long as she does not \"do too much. \"  Lasix IV given by Cards - pt reports good diuresis but I/O shows little out. Still on 4 liters - try to wean down to her usual 3 liters O2. AM labs pending. 2/5:  No complaints. O2 down to 3 liters. Stable for SNF rehab when they can take her. 2/6:  Feeling better and less short of breath. Screening Covid testing is pending for SNF rehab. 2/7:  Continues to feel better with less shortness of breath. Screening Covid pending for SNF rehab. 2/8:  Feels better with less shortness of breath. Screening Covid tests neg 7/6 and 7/7. Stable for SNF rehab. Labs should be followed closely at rehab. Follow up with PCP when out of rehab. Follow up with Cardiology as they direct. :  No complaints. Reports she continues to feel improved. Her A fib was more rapid last night last night and extra dose of Dig given -  rate ok now and no symptoms. No beds at SNF rehab yesterday - awaiting bed report today. 330PM:  Rusty Brown now has a bed and pt ready to go. Follow up instructions as noted above. Review of Systems: A comprehensive review of systems was negative except for that written in the HPI. Objective:  
Physical Exam:  
Visit Vitals BP (!) 99/55 (BP 1 Location: Right upper arm, BP Patient Position: Supine) Pulse 91 Temp 98.4 °F (36.9 °C) Resp 18 Ht 5' 7\" (1.702 m) Wt 115.2 kg (254 lb) SpO2 99% BMI 39.78 kg/m² O2 Flow Rate (L/min): 3 l/min O2 Device: Nasal cannula Temp (24hrs), Av.1 °F (36.7 °C), Min:97.8 °F (36.6 °C), Max:98.5 °F (36.9 °C) 
  701 - 1900 In: 240 [P.O.:240] Out: 1000 [Urine:1000]   1901 -  0700 In: 450 [P.O.:450] Out: 2100 [Urine:2100] General:  Alert, obese, cooperative, no distress, appears stated age. Head:  Normocephalic, without obvious abnormality, atraumatic. Eyes:  Conjunctivae/corneas clear. EOMs intact. Throat: Lips, mucosa, and tongue moist; throat red with a few white patches on tongue. Neck: Supple, symmetrical, trachea midline, no adenopathy, thyroid: no enlargement/tenderness/nodules, no carotid bruit and no JVD. Lungs:   Clear to auscultation bilaterally. Chest wall:  No tenderness or deformity. Heart:  irreg with rate in 90s - 230, no murmur, click, rub or gallop. Abdomen:   Soft, non-tender. Bowel sounds normal. No masses,  No organomegaly. Extremities: no cyanosis. 1+ LE edema on top of chronic stasis changes. No calf tenderness or cords. Pulses: 2+ and symmetric all extremities. Skin:  turgor normal. No rashes Neurologic:   Alert and oriented X 3.  equal.  No cogwheeling or rigidity. Gait not tested at this time. Sensation grossly normal to touch. Gross motor of extremities normal except generalized weakness. Data Review:  
ECHO 1/29/21 Interpretation Summary Result status: Final result · Pericardium: There is a moderate left pleural effusion. · LV: Estimated LVEF is 60 - 65%. Normal cavity size, wall thickness and systolic function (ejection fraction normal). · TV: Mild tricuspid valve regurgitation is present. Portable chest single view dated 2/2/2021 Comparison chest dated 1/30/2021 History is increasing O2 needs A single portable AP upright view of the chest was obtained. The cardiac 
silhouette continues to be enlarged. There continues be opacification of the 
left lung base and a few air bronchograms are noted in the left hilar/parahilar 
region. This is suggestive of developing atelectasis/infiltration. There is some 
atelectatic change in the mid/upper portion of the left lung. Some hazy density 
is associated with the right lung. This is suggestive of developing 
infiltration. There is blunting of both costophrenic angles. This is compatible 
with the presence of bilateral pleural effusions. IMPRESSION 1. Continued evidence of mild pulmonary edema. 2. Presence of hazy density involving both lungs suggestive of developing 
infiltration/atelectatic change. 3. Presence of bilateral pleural effusions Recent Days: 
Recent Labs 02/09/21 
0451 02/08/21 
1647 02/07/21 
9944 WBC 16.3* 16.5* 15.1* HGB 8.1* 7.9* 7.8* HCT 28.5* 27.7* 27.4*  
* 446* 443* Recent Labs 02/09/21 
0451 02/08/21 
7114 02/07/21 
5430  139 136  
K 3.9 3.7 3.9 CL 99 98 98 CO2 34* 37* 39* * 133* 126* BUN 29* 28* 27* CREA 1.18* 1.20* 1.26* CA 8.1* 8.3* 7.6* ALB 2.5* 2.4* 2.3* TBILI 0.4 0.6 0.5 ALT 30 28 26 No results for input(s): PH, PCO2, PO2, HCO3, FIO2 in the last 72 hours. 24 Hour Results: 
Recent Results (from the past 24 hour(s)) GLUCOSE, POC Collection Time: 02/08/21  3:44 PM  
Result Value Ref Range Glucose (POC) 312 (H) 65 - 100 mg/dL Performed by Lizzy Peralta GLUCOSE, POC Collection Time: 02/08/21  9:36 PM  
Result Value Ref Range Glucose (POC) 292 (H) 65 - 100 mg/dL Performed by Mindy Renee CBC WITH AUTOMATED DIFF Collection Time: 02/09/21  4:51 AM  
Result Value Ref Range WBC 16.3 (H) 3.6 - 11.0 K/uL  
 RBC 2.95 (L) 3.80 - 5.20 M/uL HGB 8.1 (L) 11.5 - 16.0 g/dL HCT 28.5 (L) 35.0 - 47.0 % MCV 96.6 80.0 - 99.0 FL  
 MCH 27.5 26.0 - 34.0 PG  
 MCHC 28.4 (L) 30.0 - 36.5 g/dL  
 RDW 16.4 (H) 11.5 - 14.5 % PLATELET 061 (H) 251 - 400 K/uL MPV 9.8 8.9 - 12.9 FL  
 NRBC 0.0 0  WBC ABSOLUTE NRBC 0.00 0.00 - 0.01 K/uL NEUTROPHILS 84 (H) 32 - 75 % LYMPHOCYTES 7 (L) 12 - 49 % MONOCYTES 6 5 - 13 % EOSINOPHILS 2 0 - 7 % BASOPHILS 0 0 - 1 % IMMATURE GRANULOCYTES 1 (H) 0.0 - 0.5 % ABS. NEUTROPHILS 13.7 (H) 1.8 - 8.0 K/UL  
 ABS. LYMPHOCYTES 1.1 0.8 - 3.5 K/UL  
 ABS. MONOCYTES 1.0 0.0 - 1.0 K/UL  
 ABS. EOSINOPHILS 0.3 0.0 - 0.4 K/UL  
 ABS. BASOPHILS 0.0 0.0 - 0.1 K/UL  
 ABS. IMM. GRANS. 0.2 (H) 0.00 - 0.04 K/UL  
 DF SMEAR SCANNED    
 RBC COMMENTS ANISOCYTOSIS 1+ 
    
 RBC COMMENTS HYPOCHROMIA 1+ METABOLIC PANEL, COMPREHENSIVE Collection Time: 02/09/21  4:51 AM  
Result Value Ref Range Sodium 137 136 - 145 mmol/L Potassium 3.9 3.5 - 5.1 mmol/L Chloride 99 97 - 108 mmol/L  
 CO2 34 (H) 21 - 32 mmol/L Anion gap 4 (L) 5 - 15 mmol/L Glucose 137 (H) 65 - 100 mg/dL BUN 29 (H) 6 - 20 MG/DL Creatinine 1.18 (H) 0.55 - 1.02 MG/DL  
 BUN/Creatinine ratio 25 (H) 12 - 20 GFR est AA 54 (L) >60 ml/min/1.73m2 GFR est non-AA 45 (L) >60 ml/min/1.73m2  Calcium 8.1 (L) 8.5 - 10.1 MG/DL  
 Bilirubin, total 0.4 0.2 - 1.0 MG/DL  
 ALT (SGPT) 30 12 - 78 U/L  
 AST (SGOT) 28 15 - 37 U/L Alk. phosphatase 242 (H) 45 - 117 U/L Protein, total 6.1 (L) 6.4 - 8.2 g/dL Albumin 2.5 (L) 3.5 - 5.0 g/dL Globulin 3.6 2.0 - 4.0 g/dL A-G Ratio 0.7 (L) 1.1 - 2.2 SAMPLES BEING HELD Collection Time: 02/09/21  4:51 AM  
Result Value Ref Range SAMPLES BEING HELD 1SST COMMENT Add-on orders for these samples will be processed based on acceptable specimen integrity and analyte stability, which may vary by analyte. GLUCOSE, POC Collection Time: 02/09/21  6:24 AM  
Result Value Ref Range Glucose (POC) 132 (H) 65 - 100 mg/dL Performed by Fredo Mtz, POC Collection Time: 02/09/21 11:48 AM  
Result Value Ref Range Glucose (POC) 182 (H) 65 - 100 mg/dL Performed by Manda Schmitt Medications reviewed Current Facility-Administered Medications Medication Dose Route Frequency  folic acid (FOLVITE) tablet 1 mg  1 mg Oral DAILY  furosemide (LASIX) tablet 40 mg  40 mg Oral DAILY  dilTIAZem ER (CARDIZEM CD) capsule 300 mg  300 mg Oral DAILY  albuterol-ipratropium (DUO-NEB) 2.5 MG-0.5 MG/3 ML  3 mL Nebulization Q6HWA RT  
 albuterol-ipratropium (DUO-NEB) 2.5 MG-0.5 MG/3 ML  3 mL Nebulization Q4H PRN  
 0.9% sodium chloride infusion 250 mL  250 mL IntraVENous PRN  predniSONE (DELTASONE) tablet 20 mg  20 mg Oral DAILY WITH BREAKFAST  nystatin (MYCOSTATIN) 100,000 unit/mL oral suspension 500,000 Units  500,000 Units Oral QID  potassium chloride SR (KLOR-CON 10) tablet 20 mEq  20 mEq Oral BID  
 amiodarone (CORDARONE) tablet 200 mg  200 mg Oral DAILY  glucose chewable tablet 16 g  4 Tab Oral PRN  
 dextrose (D50W) injection syrg 12.5-25 g  25-50 mL IntraVENous PRN  
 glucagon (GLUCAGEN) injection 1 mg  1 mg IntraMUSCular PRN  
 insulin lispro (HUMALOG) injection   SubCUTAneous AC&HS  
 • 0.9% sodium chloride infusion 250 mL  250 mL IntraVENous PRN  
• arformoteroL (BROVANA) neb solution 15 mcg  15 mcg Nebulization BID RT  
• ondansetron (ZOFRAN) injection 4 mg  4 mg IntraVENous Q4H PRN  
• lipase-protease-amylase (CREON 24,000) capsule 2 Cap  2 Cap Oral TID WITH MEALS  
• apixaban (ELIQUIS) tablet 5 mg  5 mg Oral BID  
• cholecalciferol (VITAMIN D3) (1000 Units /25 mcg) tablet 2 Tab  2,000 Units Oral DAILY  
• cyanocobalamin (VITAMIN B12) tablet 1,000 mcg  1,000 mcg Oral DAILY  
• DULoxetine (CYMBALTA) capsule 60 mg  60 mg Oral DAILY  
• ferrous sulfate tablet 325 mg  325 mg Oral DAILY WITH BREAKFAST  
• mirtazapine (REMERON) tablet 15 mg  15 mg Oral QHS  
• montelukast (SINGULAIR) tablet 10 mg  10 mg Oral QPM  
• pantoprazole (PROTONIX) tablet 40 mg  40 mg Oral ACB  
• sodium chloride (NS) flush 5-40 mL  5-40 mL IntraVENous Q8H  
• sodium chloride (NS) flush 5-40 mL  5-40 mL IntraVENous PRN  
• acetaminophen (TYLENOL) tablet 650 mg  650 mg Oral Q6H PRN  
 Or  
• acetaminophen (TYLENOL) suppository 650 mg  650 mg Rectal Q6H PRN  
• polyethylene glycol (MIRALAX) packet 17 g  17 g Oral DAILY PRN  
• guaiFENesin-dextromethorphan (ROBITUSSIN DM) 100-10 mg/5 mL syrup 5 mL  5 mL Oral Q4H PRN  
 
Care Plan discussed with: Patient/GI/Cardiology/Pul and Nurse 
Total time spent with patient: 30 minutes. 
Aly Royal MD

## 2021-02-09 NOTE — PROGRESS NOTES
2140: Pts skin is red and evelina. Pt has no dressings at this time. 0330: Pts skin is red and evelina. Pt has no dressings at this time.

## 2021-02-09 NOTE — DIABETES MGMT
[address] [facility] PROGRAM FOR DIABETES HEALTH 
 
FOLLOW-UP NOTE Presentation Dl Dodd is a 70 y.o. female admitted 1/26/21 with shortness of breath.  
  
HX:  
Past Medical History (click to expand or collapse) Past Medical History:  
Diagnosis Date  Asthma    
 Atrial fibrillation ([facility] 11/16/2018  Autoimmune disease ([facility]    
  fibromyalgia  CAD (coronary artery disease) 10/9/2015  Closed wedge compression fracture of T7 vertebra ([facility] 11/13/2018  COPD (chronic obstructive pulmonary disease) ([facility])    
 Diabetes ([facility]    
 DVT (deep vein thrombosis) in pregnancy    
 GERD (gastroesophageal reflux disease)    
 Heart failure ([facility])    
 History of vascular access device 09/30/2020  
  4f midline, 12cm at 1cm out placed in L Cephalic by Luisa Lazar RN  
 HTN (hypertension)    
 NSTEMI (non-ST elevated myocardial infarction) ([facility] 10/9/2015  Obesity (BMI 30-39.9) 11/13/2018  PAD (peripheral artery disease) ([facility])    
  prior stenting  Sleep apnea    
  wears CPAP  
 Statin intolerance    
  
  
Current clinical course has been uncomplicated.  
  
Diabetes: Patient has known Type 2 diabetes, diagnosed in 2007. Home diabetes medication regimen is Onglyza 5mg daily. Admission  and A1c 5.5% indicate good diabetes control. Subjective Patient sleeping, easily arouses to voice. Patient without complaints at this time. Objective Physical exam 
General Alert, oriented and in no acute distress. Conversant and cooperative. Vital Signs Visit Vitals BP (!) 143/69 (BP 1 Location: Right upper arm, BP Patient Position: Supine) Pulse 95 Temp 97.8 °F (36.6 °C) Resp 14 Ht 5' 7\" (1.702 m) Wt 115.2 kg (254 lb) SpO2 98% BMI 39.78 kg/m² Skin  Warm and dry Heart   Regular rate and rhythm. No murmurs, rubs or gallops Lungs  Clear to auscultation without rales or rhonchi Extremities No foot wounds Laboratory Lab Results Component Value Date/Time Hemoglobin A1c 5.5 01/27/2021 01:58 AM  
 
Lab Results Component Value Date/Time LDL, calculated 68.8 10/10/2015 05:00 AM  
 
Lab Results Component Value Date/Time Creatinine (POC) 1.1 08/01/2019 11:41 AM  
 Creatinine 1.18 (H) 02/09/2021 04:51 AM  
 
Lab Results Component Value Date/Time Sodium 137 02/09/2021 04:51 AM  
 Potassium 3.9 02/09/2021 04:51 AM  
 Chloride 99 02/09/2021 04:51 AM  
 CO2 34 (H) 02/09/2021 04:51 AM  
 Anion gap 4 (L) 02/09/2021 04:51 AM  
 Glucose 137 (H) 02/09/2021 04:51 AM  
 BUN 29 (H) 02/09/2021 04:51 AM  
 Creatinine 1.18 (H) 02/09/2021 04:51 AM  
 BUN/Creatinine ratio 25 (H) 02/09/2021 04:51 AM  
 GFR est AA 54 (L) 02/09/2021 04:51 AM  
 GFR est non-AA 45 (L) 02/09/2021 04:51 AM  
 Calcium 8.1 (L) 02/09/2021 04:51 AM  
 Bilirubin, total 0.4 02/09/2021 04:51 AM  
 Alk. phosphatase 242 (H) 02/09/2021 04:51 AM  
 Protein, total 6.1 (L) 02/09/2021 04:51 AM  
 Albumin 2.5 (L) 02/09/2021 04:51 AM  
 Globulin 3.6 02/09/2021 04:51 AM  
 A-G Ratio 0.7 (L) 02/09/2021 04:51 AM  
 ALT (SGPT) 30 02/09/2021 04:51 AM  
 
Lab Results Component Value Date/Time ALT (SGPT) 30 02/09/2021 04:51 AM  
 
 
 
Factors affecting BG pattern Factor Dose Comments Nutrition: 
Carb-controlled meals   
60 grams/meal    
Drugs: 
Vasopressor load Steroids HIV Epogen Blood transfusion(s) Other: n/a   
n/a Prednison 20 mg daily 
n/a 
  
  
  
A1cs inaccurate A1cs inaccurate Pain 0/10    
Infection abx discontinued PNA Other: n/a n/a    
 
 
Blood glucose pattern Evaluation This 70year old female, with Type 2 diabetes, did achieve diabetes control prior to admission (PTA), as evidenced by admission BG of 262 and A1c of 5.5%. Based on assessment of diabetes self-care practices, interventions that warrant action while hospitalized include: 
            [x]? Healthy Eating [x]?        Being Active [x]? Monitoring                                
  
  
The patient would also benefit from diabetes self-management education and support [facility]) after discharge. 
  
During this hospitalization, the patient has achieved inpatient blood glucose target of 100-180mg/dl. BG's have ranged 132-312 over the past 24 hours. Factors that have played a role include: 
[x]? Kidney dysfunction 
[x]? Glucocorticoid Use  
  
Basal insulin isn't in use.  
  
Bolus insulin isn't in use. Patient is eating meals. 
  
Corrective insulin is in use. Patient has required 7 units corrective insulin over the past 24 hours. Recommendations have been placed for glycemic control. [facility] Diabetes [facility] is signing off at this time. Please re-consult us if you need additional assistance with diabetes management.   
  
To optimize BG control and support a positive health outcome, would utilize the Subcutaneous Insulin Order set (0304). Impression: As suspected, corrective insulin alone has not been sufficient to consistently maintain patient's BG at target. Patient home regimen is Onglyza daily. Would suspect patient needs to have low dose basal insulin to maintain BG control. Assessment and Plan Nursing Diagnosis Risk for unstable blood glucose pattern Nursing Intervention Domain 2409 Decision-making Support Nursing Interventions Examined current inpatient diabetes control Explored factors facilitating and impeding inpatient management Recommendations Recommend: 
  
[x]? Use of Subcutaneous Insulin Order set (5842) Insulin Use Recommendation Basal                                      (Based on weight, BMI & GFR) Addresses basic metabolic needs Lantus 20 units daily Nutritional                                      (Based on CHO load) Addresses nutrition interventions If patient experiences pre-prandial BG > 200mg/dL, start Humalog 6 units with meals. Corrective                                       (Offset gaps in dosing) Useful in adjusting insulin dosing Correctional Scale for Normal Sensitivity 
  
200-249- 2units Humalog [phone] Humalog [phone] Humalog [phone] Humalog Over 400- 10units Humalog 
  
Do NOT hold for NPO; give in addition to meal time insulin dose. 
  
If patient does not eat, -give correction dose only.  
   
  
[x]? Discharge Recommendations: 
 Resume Onglyza 5mg daily  
 
  
[x]? Referral to 
  
[x]? Diabetes Self-Management Training through [facility] Diabetes [facility] (Phone [phone] to schedule appointment) Time Spent/ Billing Codes Total time spent with patient: 15 Minutes   I personally reviewed chart, notes, data and current medications in the medical record. I have personally examined and treated the patient at bedside during this period. [x] 17876 IP subsequent hospital care - 15 minutes MIKAYLA Arreaga Diabetes Clinical Nurse Specialist 
[facility] Access via [facility] [employer]

## 2021-02-09 NOTE — PROGRESS NOTES
Patient , MD notified ordered 6 units on sliding scale. Will continue to monitor.  
 
s Bedside shift change report given to Kiera Casper, [facility] (oncoming nurse) by Kj Rae RN (offgoing nurse). Report included the following information SBAR, Kardex, Intake/Output, MAR and Accordion.

## 2021-02-09 NOTE — PROGRESS NOTES
0225: RN calls [address]bal [address] MD for orders regarding pts. Recurrent A-fib, and MD orders for pt to receive one time IV dose of 0.25mg of digoxin at this time. MD stated to give the pt her dose of Digoxin and someone from his practice will come see the pt in the morning. MD states pts heart rate is not a concern at this time.

## 2021-02-09 NOTE — PROGRESS NOTES
2/9/2021   CARE MANAGEMENT NOTE:   Pt has been accepted to [facility] and a skilled bed is available today. RN, please call report to [address] [place] [address] at [phone]. [facility] has been arranged for 6:30 p.m. Transition of Care Plan to SNF/Rehab 
 
SNF/Rehab Transition: 
Patient has been accepted to [address] [place] [address] and meets criteria for admission. Patient will transported by [facility] and expected to leave at 6:30 p.m. Communication to Patient/Family: 
CM notified pt and she is agreeable to the transition plan. Communication to SNF/Rehab: 
Bedside RN, Melani Mayen, has been notified to update the transition plan to the facility and call report (phone number [phone]). Discharge information has been updated on the AVS. Discharge instructions to be fax'd to facility at [facility] # [phone]). Patient has been identified as part of the Cardiac bundle  BCPI-A Program.  For Care Coordination associated with that Bundle Program, please contact N/A Bundle information has been communication to N/A. Nursing Please include all hard scripts for controlled substances, med rec and dc summary, and AVS in packet. Reviewed and confirmed with facility, [facility], can manage the patient care needs for the following: SNF/Rehab Transition: 
Patient to follow-up with Home Health: [facility], other  ,none) PCP/Specialist: Dr. Albaro Rawls Reviewed and confirmed with facility, [address] [place] [address] they can manage the patient care needs for the following:  
 
Jaydon Ear with (X) only those applicable: 
 
Medication: 
[]  Medications will be available at the facility []  IV Antibiotics N/A 
[]  Controlled Substance - hard copy to be sent with patient  
[]  Weekly Labs Documents: 
[] Hard RX [x] MAR 
[] Kardex [x] AVS 
[x]Transfer Summary []Discharge Equipment: 
[]  CPAP/BiPAP []  Wound Vacuum 
[]  [place] or Urinary Device 
[]  PICC/Central Line 
[]  Nebulizer 
[]  Ventilator Treatment: 
[]Isolation (for MRSA, VRE, etc.) []Surgical Drain Management []Tracheostomy Care 
[]Dressing Changes []Dialysis with transportation and chair time N/A 
[]PEG Care []Oxygen []Daily Weights for Heart Failure Dietary: 
[]Any diet limitations []Tube Feedings []Total Parenteral Management (TPN) Eligible for Medicaid Long Term Services and Supports Yes: 
[] Eligible for medical assistance or will become eligible within 180 days and UAI completed. [] Provider/Patient and/or support system has requested screening. [] UAI copy provided to patient or responsible party,  
[] UAI unavailable at discharge will send once processed to SNF provider. [] UAI unavailable at discharged mailed to patient No:  
[] Private pay and is not financially eligible for Medicaid within the next 180 days. [] Reside out-of-state. [] A residents of a state owned/operated facility that is licensed 
by [place] [address] [employer] or [facility] 
[] Enrollment in [facility] hospice services 
[] [address] 
[] Patient /Family declines to have screening completed or provide financial information for screening Financial Resources: 
Medicaid   
[] Initiated and application pending  
[] Full coverage Advanced Care Plan: 
[]Surrogate Decision Maker of Care 
[]POA []Communicated Code Status Full (DDNR\", \"Full\") Other Financial Resources: 
Medicaid   
[] Initiated and application pending  
[] Full coverage Advanced Care Plan: 
[]Surrogate Decision Maker of Care 
[]POA []Communicated Code Status Full (DDNR\", \"Full\") Other COVID 19 test for placement is negative (PCR on 2/6) and Rapid negative on 2/7. Jeanette

## 2021-02-09 NOTE — DISCHARGE INSTRUCTIONS
Patient Discharge Instructions    Angelica Guillen / [id number] : 1945    Admitted 2021 Discharged: 2021 3:30 PM     ACUTE DIAGNOSES:  SOB (shortness of breath) [R06.02]  Anemia [D64.9]  Pneumonia [J18.9]  Chronic respiratory failure with hypoxia (HCC) [J96.11]  Leukocytosis [D72.829]  Uncontrolled type 2 diabetes mellitus with hyperglycemia (HCC) [E11.65]  Acute on chronic systolic CHF (congestive heart failure) ([facility] [I50.23]    CHRONIC MEDICAL DIAGNOSES:  Problem List as of 2021 Date Reviewed: 2020          Codes Class Noted - Resolved    Pneumonia ICD-10-CM: J18.9  ICD-9-CM: 486  2021 - Present        SOB (shortness of breath) ICD-10-CM: R06.02  ICD-9-CM: 786.05  2021 - Present        Anemia ICD-10-CM: D64.9  ICD-9-CM: 285.9  2021 - Present        Acute on chronic systolic CHF (congestive heart failure) (HCC) ICD-10-CM: I50.23  ICD-9-CM: 428.23, 428.0  2021 - Present        Uncontrolled type 2 diabetes mellitus with hyperglycemia (HCC) ICD-10-CM: E11.65  ICD-9-CM: 250.02  2021 - Present        Left lower lobe pneumonia ICD-10-CM: J18.9  ICD-9-CM: 486  2021 - Present        Leukocytosis ICD-10-CM: D72.829  ICD-9-CM: 288.60  11/3/2020 - Present        Chronic respiratory failure with hypoxia (HCC) ICD-10-CM: J96.11  ICD-9-CM: 518.83, 799.02  2020 - Present    Overview Signed 2020 10:12 PM by Kary Bajwa MD     On 3L [place] at home              Cellulitis of lower extremity ICD-10-CM: L03.119  ICD-9-CM: 682.6  3/23/2020 - Present        ASO (arteriosclerosis obliterans) ICD-10-CM: I70.90  ICD-9-CM: 440.9  2019 - Present        PVD (peripheral vascular disease) ([facility] ICD-10-CM: I73.9  ICD-9-CM: 443.9  2019 - Present        Severe obesity ([facility] ICD-10-CM: E66.01  ICD-9-CM: 278.01  2019 - Present        COPD (chronic obstructive pulmonary disease) ([facility] ICD-10-CM: J44.9  ICD-9-CM: 496  2019 - Present        Normocytic anemia ICD-10-CM: D64.9  ICD-9-CM: 285.9  7/13/2019 - Present        PAD (peripheral artery disease) ([facility] ICD-10-CM: I73.9  ICD-9-CM: 443.9  7/13/2019 - Present        Mild intermittent asthma ICD-10-CM: J45.20  ICD-9-CM: 493.90  5/17/2019 - Present        Brachial artery thrombus (HCC) ICD-10-CM: I74.2  ICD-9-CM: 444.21  4/26/2019 - Present        Sleep apnea ICD-10-CM: G47.30  ICD-9-CM: 780.57  1/5/2019 - Present    Overview Signed 1/5/2019  8:41 PM by Ilene Ferguson DO     wears CPAP             Atrial fibrillation ([facility] ICD-10-CM: I48.91  ICD-9-CM: 427.31  11/16/2018 - Present        Obesity (BMI 30-39.9) (Chronic) ICD-10-CM: E66.9  ICD-9-CM: 278.00  11/13/2018 - Present        Recurrent deep vein thrombosis (DVT) ([facility] ICD-10-CM: I82.409  ICD-9-CM: 453.40  3/30/2018 - Present        Chronic anticoagulation (Chronic) ICD-10-CM: Z79.01  ICD-9-CM: V58.61  3/30/2018 - Present        Essential hypertension (Chronic) ICD-10-CM: I10  ICD-9-CM: 401.9  1/22/2016 - Present        CAD (coronary artery disease) ICD-10-CM: I25.10  ICD-9-CM: 414.00  10/9/2015 - Present        Type II diabetes mellitus ([facility] (Chronic) ICD-10-CM: E11.9  ICD-9-CM: 250.00  10/9/2015 - Present        RESOLVED: Syncope and collapse ICD-10-CM: R55  ICD-9-CM: 780.2  9/29/2020 - 11/2/2020        RESOLVED: Cellulitis ICD-10-CM: L03.90  ICD-9-CM: 682.9  3/23/2020 - 5/29/2020        RESOLVED: Hypokalemia ICD-10-CM: E87.6  ICD-9-CM: 276.8  3/23/2020 - 5/29/2020        RESOLVED: Hyponatremia ICD-10-CM: E87.1  ICD-9-CM: 276.1  3/23/2020 - 5/29/2020        RESOLVED: Diarrhea ICD-10-CM: R19.7  ICD-9-CM: 787.91  3/23/2020 - 5/29/2020        RESOLVED: Syncope and collapse ICD-10-CM: R55  ICD-9-CM: 780.2  7/13/2019 - 5/29/2020        RESOLVED: UTI (urinary tract infection) ICD-10-CM: N39.0  ICD-9-CM: 599.0  7/13/2019 - 11/2/2020        RESOLVED: Sepsis ([address] ICD-10-CM: A41.9  ICD-9-CM: 038.9, 995.91  7/13/2019 - 11/3/2020        RESOLVED: Leg wound, left ICD-10-CM: E88.273S  ICD-9-CM: 891.0  7/13/2019 - 5/29/2020        RESOLVED: Leg laceration, right, initial encounter ICD-10-CM: S81.811A  ICD-9-CM: 894.0  7/13/2019 - 5/29/2020        RESOLVED: Cellulitis of right upper arm ICD-10-CM: L03.113  ICD-9-CM: 682.3  5/17/2019 - 5/29/2020        RESOLVED: Gangrene of finger of right hand ([email or web address] ICD-10-CM: I96  ICD-9-CM: 785.4  5/17/2019 - 11/2/2020        RESOLVED: Bowel obstruction ([email or web address] ICD-10-CM: D78.096  ICD-9-CM: 560.9  1/8/2019 - 5/29/2020        RESOLVED: Partial small bowel obstruction ([email or web address] ICD-10-CM: K56.600  ICD-9-CM: 560.9  1/5/2019 - 5/29/2020        RESOLVED: Dyspnea ICD-10-CM: R06.00  ICD-9-CM: 786.09  11/13/2018 - 5/29/2020        RESOLVED: ARF (acute renal failure) ([facility] ICD-10-CM: N17.9  ICD-9-CM: 584.9  11/13/2018 - 5/29/2020        RESOLVED: Closed wedge compression fracture of T7 vertebra ([email or web address] ICD-10-CM: A21.955M  ICD-9-CM: 805.2  11/13/2018 - 5/29/2020        RESOLVED: Type 2 diabetes with nephropathy ([email or web address] ICD-10-CM: E11.21  ICD-9-CM: 250.40, 583.81  10/1/2018 - 11/2/2020        RESOLVED: Chest pain ICD-10-CM: R07.9  ICD-9-CM: 786.50  6/27/2018 - 5/29/2020        RESOLVED: Abdominal pain ICD-10-CM: R10.9  ICD-9-CM: 789.00  6/27/2018 - 5/29/2020        RESOLVED: Coronary artery disease involving native coronary artery without angina pectoris (Chronic) ICD-10-CM: I25.10  ICD-9-CM: 414.01  1/22/2016 - 11/2/2020        RESOLVED: HTN (hypertension) ICD-10-CM: I10  ICD-9-CM: 401.9  10/9/2015 - 1/22/2016        RESOLVED: NSTEMI (non-ST elevated myocardial infarction) [facility]) ICD-10-CM: I21.4  ICD-9-CM: 410.70  10/9/2015 - 5/29/2020              DISCHARGE MEDICATIONS:         · It is important that you take the medication exactly as they are prescribed. · Keep your medication in the bottles provided by the pharmacist and keep a list of the medication names, dosages, and times to be taken in your wallet.    · Do not take other medications without consulting your doctor. DIET:  Diabetic Diet  ACTIVITY: Activity as tolerated    ADDITIONAL INFORMATION: If you experience any of the following symptoms then please call your primary care physician or return to the emergency room if you cannot get hold of your doctor: Fever, chills, nausea, vomiting, diarrhea, change in mentation, falling, bleeding, shortness of breath. FOLLOW UP CARE:  Dr. Abril Reilly MD  you are to call and set up an appointment to see them with in 1 week. Follow-up with specialists at directed by them      Information obtained by :  I understand that if any problems occur once I am at home I am to contact my physician. I understand and acknowledge receipt of the instructions indicated above.                                                                                                                                            Physician's or R.N.'s Signature                                                                  Date/Time                                                                                                                                              Patient or Representative Signature                                                          Date/Time

## 2021-02-09 NOTE — PROGRESS NOTES
Comprehensive Nutrition Assessment Type and Reason for Visit: Cris Bustos Nutrition Recommendations/Plan: 1. Continue with Consistent CHO diet order. 2. Continue chocolate Glucerna BID to promote adequate po intake. Nutrition Assessment:     
2/9: F/u. Pt continues with good po intake, % meals. Pt ate 90% lunch today and 75% breakfast. Per MD note, pt medically stable for D/C- awaiting bed at SNF. Will continue Glucerna while here. Labs- Hgb 8.1, Cr 1.18, -225-118. Meds- reviewed. 2/2: F/u. Pt reports her appetite has improved. Reports eating bagel, cash, eggs, and 100% Glucerna. Says she wasn't as hungry for lunch but ate some of her pasta, salad, and cake. Recorded intakes 50-75% meals yesterday. Likes Glucerna and is drinking all of them. Dislikes Gelatein and Ensure Pudding- will d/c. Will continue Glucerna. No c/o N/V. Labs- Hgb 8.1, Mg 2.5, Cr 1.19, -[phone]. Meds- vit. D3, Vit B12, FeSu, lasix, insulin, Creon, KCl, Remeron, Protonix, prednisone. 1/29: 69 yo female admitted for SOB. PMHx: CAD, CHF, COPD, DM, GERD, HTN, PAD, chronic hypoxic respiratory failure on 3L home O2. Class II obese per BMI of 38. Weight hx in EMR indicates weight loss of 3.8kg over last 3 months (3% loss which is not significant for time frame). Spoke with pt at bedside, confirms having had weight loss since January 1st secondary to eating less. States her appetite has been good at [address] but the portions are smaller than she is used to eating. C/o poor PO intakes since admission secondary to food being cold, not liking it. States she has not eaten anything since admission. Discussed ONS options, pt states she has had Boost in past and likes it, willing to try Glucerna, Ensure Pudding, and [id number]. Labs: , POC [id number]. Meds: Vit B 12 and D3, Remeron, lasix, Kcl, Humalog, Prednisone, Creon, FeSu. Intakes: 
Patient Vitals for the past 168 hrs: 
 % Diet Eaten 02/09/21 1200 90 % 02/09/21 0855 75 % 02/07/21 1627 75 % 02/07/21 0845 100 % 02/06/21 0800 100 % 02/05/21 0318 0 % 02/04/21 0915 50 % 02/03/21 1427 100 % 02/03/21 0830 75 % Malnutrition Assessment: 
Does not meet criteria at this time. Estimated Daily Nutrient Needs: 
Energy (kcal): 1986(REE 1655 x AF 1.2); Weight Used for Energy Requirements: Current Protein (g): 89(0.8-1gm/kg/d); Weight Used for Protein Requirements: Current Fluid (ml/day): 1986; Method Used for Fluid Requirements: 1 ml/kcal 
 
 
Nutrition Related Findings:  Last BM 2/8; 2+ LE edema Wounds:   
Multiple Current Nutrition Therapies: DIET DIABETIC CONSISTENT CARB Regular DIET NUTRITIONAL SUPPLEMENTS Breakfast, Lunch; Glucerna Shake Anthropometric Measures: 
· Height:  5' 7\" (170.2 cm) · Current Body Wt:  115.2 kg (254 lb) · Admission Body Wt:      
· Usual Body Wt:       
· Ideal Body Wt:  135 lbs:  182.6 % · Adjusted Body Weight:   ; Weight Adjustment for: No adjustment · Adjusted BMI:      
· BMI Category:  Obese class 2 (BMI 35.0-39. 9) Nutrition Diagnosis:  
· Inadequate protein-energy intake related to inadequate protein-energy intake as evidenced by intake 0-25% 2/2: Nutrition dx improving, >/=50% meals + ONS. 
2/9: Nutrition dx resolved. Good po intake of meals and ONS. Nutrition Interventions:  
Food and/or Nutrient Delivery: Continue current diet, Continue oral nutrition supplement Nutrition Education and Counseling: No recommendations at this time Coordination of Nutrition Care: Continue to monitor while inpatient Goals: 
PO intake >50% meals + ONS next 5-7 days Nutrition Monitoring and Evaluation:  
Behavioral-Environmental Outcomes:   
Food/Nutrient Intake Outcomes: Food and nutrient intake, Supplement intake Physical Signs/Symptoms Outcomes: Biochemical data, GI status, Weight Discharge Planning:   
Continue current diet Electronically signed by Marilee Wiley RD on 2/9/2021 Contact: [phone]

## 2021-02-09 NOTE — PROGRESS NOTES
Bedside shift change report given to Susan Le RN (oncoming nurse) by Sherry Earl RN (offgoing nurse). Report included the following information SBAR, Kardex, Intake/Output, MAR, Accordion and Recent Results.

## 2021-02-09 NOTE — PROGRESS NOTES
RN notified MD patient -140 for 3-4 minutes then back down into 120s. MD said to inform cardio. Will continue to monitor.

## 2021-02-09 NOTE — ROUTINE PROCESS
TRANSFER - OUT REPORT: 
 
Verbal report given to Travis Cheema RN(name) on Jos Edward  being transferred to [facility] for routine progression of care Report consisted of patients Situation, Background, Assessment and  
Recommendations(SBAR). Information from the following report(s) SBAR, Kardex, Intake/Output, MAR, Accordion and Recent Results was reviewed with the receiving nurse. Opportunity for questions and clarification was provided. Patient transported with: 
 O2 @ 3 liters Via stretcher with [facility]. PIV removed with tip intact. Patient belongings gathered at bedside. AVS sent with [facility] to facility.

## 2021-02-09 NOTE — PROGRESS NOTES
0700: Bedside and Verbal shift change report given to ELIZ Pérez RN (oncoming nurse) by Agueda Morin RN (offgoing nurse). Report included the following information SBAR, Kardex, Intake/Output, MAR, Accordion, Recent Results and Med Rec Status.

## 2021-02-09 NOTE — PROGRESS NOTES
PCR 2/6 and Rapid 2/7  sent to [address] [place] [address] in [employer]. Updated clinicals also sent. Patricia Fernandez in admissions state that the patient did not need PT and OT evaluations to return. Dr. Vito Purvis would like to discharge today and [address] [place] [address] can accept the patient today.  CINDI Hooker

## 2021-02-15 NOTE — ED PROVIDER NOTES
72-year-old female history of asthma, A. fib, fibromyalgia, coronary disease, COPD on chronic O2 therapy, DVT, hypertension, NSTEMI, obesity, recurrent anemia with multiple blood transfusions with recent admission to the hospital for anemia with reportedly negative colonoscopy and other work-up presents to the emergency department today from home by EMS with chief complaint of low hemoglobin on a CBC checked this morning. She denies any chest pain or pressure, worsening shortness of breath, new or worsening weakness. She has chronic bilateral lower extremity edema which is unchanged. She denies any fevers or chills. The history is provided by the patient and medical records. Abnormal Lab Results This is a recurrent problem. Episode onset: This morning. The problem occurs constantly. The problem has not changed since onset. Associated symptoms include shortness of breath (Chronic and unchanged). Pertinent negatives include no chest pain, no abdominal pain and no headaches. She has tried nothing for the symptoms. Past Medical History:  
Diagnosis Date  Asthma  Atrial fibrillation ([address] 11/16/2018  Autoimmune disease ([address]   
 fibromyalgia  CAD (coronary artery disease) 10/9/2015  Closed wedge compression fracture of T7 vertebra ([address] 11/13/2018  COPD (chronic obstructive pulmonary disease) (HCC)  Diabetes ([address]  DVT (deep vein thrombosis) in pregnancy  GERD (gastroesophageal reflux disease)  Heart failure ([address]  History of vascular access device 09/30/2020  
 4f midline, 12cm at 1cm out placed in L Cephalic by Chandan Peralta RN  
 HTN (hypertension)  NSTEMI (non-ST elevated myocardial infarction) ([address] 10/9/2015  Obesity (BMI 30-39.9) 11/13/2018  PAD (peripheral artery disease) ([address] prior stenting  Sleep apnea   
 wears CPAP  
 Statin intolerance Past Surgical History:  
Procedure Laterality Date  COLONOSCOPY N/A 1/4/2021 COLONOSCOPY performed by Bryanna Schaffer MD at [address] [facility]  HX COLOSTOMY    
 and reversal, post episiotomy repair [address]  HX UROLOGICAL  2009  
 bladder sling  IR KYPHOPLASTY LUMBAR  10/8/2020  IN ABDOMEN SURGERY PROC UNLISTED    
 hital hernia repair, gall bladder removed  IN AMPUTATION TRANSMETACARPAL Right 06/12/2019  
 entire ring finger, 2nd & 3rd fingers (transmetacarpal)  IN BREAST SURGERY PROCEDURE UNLISTED    
 lumpectomy  IN CARDIAC SURG PROCEDURE UNLIST  10/9/15  
 2 stents Family History:  
Problem Relation Age of Onset  Diabetes Mother  Heart Failure Mother  Kidney Disease Mother  Diabetes Father  Heart Disease Father  Elevated Lipids Father  Heart Failure Father  Heart Disease Brother  Cancer Brother   
     kidney  Diabetes Brother  No Known Problems Brother  No Known Problems Brother Social History Socioeconomic History  Marital status:  Spouse name: Not on file  Number of children: Not on file  Years of education: Not on file  Highest education level: Not on file Occupational History  Not on file Social Needs  Financial resource strain: Not on file  Food insecurity Worry: Not on file Inability: Not on file  Transportation needs Medical: Not on file Non-medical: Not on file Tobacco Use  Smoking status: Former Smoker Quit date: 3/30/2017 Years since quitting: 3.8  Smokeless tobacco: Never Used Substance and Sexual Activity  Alcohol use: No  
  Alcohol/week: 0.0 standard drinks Comment: very very rarely  Drug use: No  
 Sexual activity: Never Lifestyle  Physical activity Days per week: Not on file Minutes per session: Not on file  Stress: Not on file Relationships  Social connections Talks on phone: Not on file Gets together: Not on file Attends Caodaism service: Not on file Active member of club or organization: Not on file Attends meetings of clubs or organizations: Not on file Relationship status: Not on file  Intimate partner violence Fear of current or ex partner: Not on file Emotionally abused: Not on file Physically abused: Not on file Forced sexual activity: Not on file Other Topics Concern [address] Service Not Asked  Blood Transfusions Not Asked  Caffeine Concern Not Asked  Occupational Exposure Not Asked [employer] Hazards Not Asked  Sleep Concern Not Asked  Stress Concern Not Asked  Weight Concern Not Asked  Special Diet Not Asked  Back Care Not Asked  Exercise Not Asked  Bike Helmet Not Asked  Seat Belt Not Asked  Self-Exams Not Asked Social History Narrative  Not on file ALLERGIES: Advair diskus [fluticasone propion-salmeterol], Aspartame, Breo ellipta [fluticasone furoate-vilanterol], Bumex [bumetanide], Ciprofloxacin, Statins-hmg-coa reductase inhibitors, Sulfa (sulfonamide antibiotics), Tetracycline, Torsemide, and Zetia [ezetimibe] Review of Systems Constitutional: Negative for fatigue and fever. HENT: Negative for sneezing and sore throat. Respiratory: Positive for shortness of breath (Chronic and unchanged). Negative for cough. Cardiovascular: Positive for leg swelling (Chronic and unchanged). Negative for chest pain. Gastrointestinal: Negative for abdominal pain, diarrhea, nausea and vomiting. Genitourinary: Negative for difficulty urinating and dysuria. Musculoskeletal: Negative for arthralgias and myalgias. Skin: Negative for color change and rash. Neurological: Negative for weakness and headaches. Psychiatric/Behavioral: Negative for agitation and behavioral problems. There were no vitals filed for this visit. Physical Exam 
Vitals signs and nursing note reviewed. Constitutional: General: She is not in acute distress. Appearance: Normal appearance. She is obese. She is not ill-appearing, toxic-appearing or diaphoretic. HENT:  
   Head: Normocephalic and atraumatic. Nose: Nose normal.  
   Mouth/Throat:  
   Mouth: Mucous membranes are moist.  
   Pharynx: Oropharynx is clear. Eyes:  
   Extraocular Movements: Extraocular movements intact. Conjunctiva/sclera: Conjunctivae normal.  
   Pupils: Pupils are equal, round, and reactive to light. Neck: Musculoskeletal: Normal range of motion and neck supple. No muscular tenderness. Cardiovascular:  
   Rate and Rhythm: Normal rate and regular rhythm. Pulses: Normal pulses. Heart sounds: Normal heart sounds. No murmur. Pulmonary:  
   Effort: No respiratory distress. Breath sounds: Normal breath sounds. Abdominal:  
   General: There is no distension. Palpations: Abdomen is soft. Tenderness: There is no abdominal tenderness. There is no guarding or rebound. Hernia: No hernia is present. Musculoskeletal: Normal range of motion. General: No swelling. Right lower leg: Edema present. Left lower leg: Edema present. Skin: 
   General: Skin is warm and dry. Capillary Refill: Capillary refill takes less than 2 seconds. Findings: Rash (Bilateral lower extremity erythema which patient says is improving) present. Neurological:  
   General: No focal deficit present. Mental Status: She is alert and oriented to person, place, and time. Psychiatric:     
   Mood and Affect: Mood normal.     
   Behavior: Behavior normal.  
 
  
 
MDM Number of Diagnoses or Management Options Diagnosis management comments: 19-year-old female history of longstanding anemia with frequent transfusions and recent hospitalization with negative work-up presents as above with low H&H. She has no symptoms and no complaints to me. Plan to transfuse in the ED and discharged back home with instructions to follow-up with her primary care doctor soon as possible for further management. Amount and/or Complexity of Data Reviewed Clinical lab tests: reviewed Decide to obtain previous medical records or to obtain history from someone other than the patient: yes Procedures

## 2021-02-15 NOTE — ED TRIAGE NOTES
Patient arrives from [facility] for Hbg of 6.9. Patient asymptomatic, denies any SOB or dizziness. History of COPD, wears 3L O2 at baseline

## 2021-02-16 NOTE — ED NOTES
Assumed care of patient. Introduced self as primary nurse using [address] [place] [address] Nw. Stretcher in low locked position with call bell within reach. Pt updated on plan of care and wait times. Pt instructed to use call bell if assistance is needed. Patient pleasant and conversational. Denies any needs.

## 2021-02-16 NOTE — ED NOTES
Verbal shift change report given to Jan Morris (oncoming nurse) by Glenny Mendoza (offgoing nurse). Report included the following information SBAR and ED Summary.

## 2021-02-16 NOTE — ED NOTES
Patient transferred back to [facility]. SBAR report given to Val Barfield.

## 2021-02-23 PROBLEM — R79.89 ELEVATED BRAIN NATRIURETIC PEPTIDE (BNP) LEVEL: Status: ACTIVE | Noted: 2021-01-01

## 2021-02-23 PROBLEM — R06.00 DYSPNEA: Status: ACTIVE | Noted: 2021-01-01

## 2021-02-23 PROBLEM — K92.2 GIB (GASTROINTESTINAL BLEEDING): Status: ACTIVE | Noted: 2021-01-01

## 2021-02-23 PROBLEM — D64.9 SYMPTOMATIC ANEMIA: Status: ACTIVE | Noted: 2021-01-01

## 2021-02-23 PROBLEM — D62 ACUTE BLOOD LOSS ANEMIA: Status: ACTIVE | Noted: 2021-01-01

## 2021-02-23 NOTE — PROGRESS NOTES
Problem: Risk for Spread of Infection Goal: Prevent transmission of infectious organism to others Description: Prevent the transmission of infectious organisms to other patients, staff members, and visitors. Outcome: Progressing Towards Goal 
  
Problem: Falls - Risk of 
Goal: *Absence of Falls Description: Document Eamon Jose Fall Risk and appropriate interventions in the flowsheet. Outcome: Progressing Towards Goal 
Note: Fall Risk Interventions: 
Mobility Interventions: Utilize walker, cane, or other assistive device Medication Interventions: Teach patient to arise slowly, Patient to call before getting OOB

## 2021-02-23 NOTE — ED NOTES
Verbal shift change report given to Yogi (oncoming nurse) by Corrine Turner (offgoing nurse). Report included the following information SBAR, ED Summary and MAR.

## 2021-02-23 NOTE — ED NOTES
Pt. Noted to have rhythm change, pt. Is in Afib at this time. Pt. Has hx of the same. ER MD informed. Pt. Awaiting orders from hospitalist for admission.

## 2021-02-23 NOTE — ED TRIAGE NOTES
PT arrives via EMS from [facility] with complaints of abnormal lab results and chest x-ray. Reports SOB since yesterday, denies any chest pain, hx of anemia.

## 2021-02-23 NOTE — PROGRESS NOTES
[facility]: MED RECONCILIATION Comments/Recommendations:  
Medication information was obtained from [facility] transfer papers. Patient states Zaida Malave takes once a month medication for bones\" (Boniva?) , but the medication MAR from [facility] show Alendronate (Fosamax) weekly. Patient states her Diltiazem is \"as needed for SBP > 140\". This medication is scheduled daily on the [facility] from [facility]. Medication(s) ADDED to PTA list: 
Prep H prn 
 
Medication(s) REMOVED from PTA list: 
Zinc cream and Medihoney Medication(s) ADJUSTED on PTA list: 
 
 
 
Allergies: Advair diskus [fluticasone propion-salmeterol], Aspartame, Breo ellipta [fluticasone furoate-vilanterol], Bumex [bumetanide], Ciprofloxacin, Statins-hmg-coa reductase inhibitors, Sulfa (sulfonamide antibiotics), Tetracycline, Torsemide, and Zetia [ezetimibe] Prior to Admission Medications:  
Prior to Admission Medications Prescriptions Last Dose Informant Patient Reported? Taking? DULoxetine (CYMBALTA) 60 mg capsule 2021 at Unknown time Transfer Papers Yes Yes Sig: Take 60 mg by mouth daily. acetaminophen (TYLENOL) 325 mg tablet  Transfer Papers No Yes Sig: Take 1 Tab by mouth every four (4) hours as needed for Pain. albuterol (PROAIR HFA) 90 mcg/actuation inhaler  Transfer Papers Yes Yes Sig: Take 2 Puffs by inhalation every four (4) hours as needed. albuterol (PROVENTIL VENTOLIN) 2.5 mg /3 mL (0.083 %) nebulizer solution  Transfer Papers Yes Yes Si.5 mg by Nebulization route every six (6) hours as needed for Wheezing or Shortness of Breath. alendronate (FOSAMAX) 70 mg tablet  Transfer Papers Yes Yes Sig: Take 70 mg by mouth every seven (7) days. On Sundays  
amiodarone (CORDARONE) 200 mg tablet 2021 at Unknown time  No Yes Sig: Take 1 Tab by mouth daily. apixaban (ELIQUIS) 5 mg tablet 2021 at 1700 Transfer Papers Yes Yes Sig: Take 5 mg by mouth two (2) times a day. aspirin 81 mg chewable tablet 2/22/2021 at Unknown time Transfer Papers Yes Yes Sig: Take 81 mg by mouth daily. biotin 10,000 mcg cap 2/22/2021 at Unknown time Transfer Papers Yes Yes Sig: Take 1 Cap by mouth daily. cholecalciferol, vitamin D3, (Vitamin D3) 50 mcg (2,000 unit) tab 2/22/2021 at Unknown time Transfer Papers Yes Yes Sig: Take 2,000 Units by mouth daily. cyanocobalamin 1,000 mcg tablet 2/22/2021 at Unknown time Transfer Papers No Yes Sig: Take 1 Tab by mouth daily. dilTIAZem ER (CARDIZEM CD) 300 mg capsule 2/22/2021 at Unknown time  No Yes Sig: Take 1 Cap by mouth daily. ferrous sulfate 325 mg (65 mg iron) tablet 2/22/2021 at Unknown time Transfer Papers No Yes Sig: Take 1 Tab by mouth daily (with breakfast). furosemide (LASIX) 40 mg tablet 2/22/2021 at Unknown time Transfer Papers Yes Yes Sig: Take 40 mg by mouth two (2) times a day. hydrocortisone (Preparation H Hydrocortisone) 1 % topical cream   Yes Yes Sig: Apply  to affected area two (2) times daily as needed for Other (hemorroids). use thin layer  
lactobacillus rhamnosus gg 10 billion cell (CULTURELLE) 10 billion cell capsule 2/22/2021 at Unknown time Transfer Papers Yes Yes Sig: Take 1 Cap by mouth daily. lipase-protease-amylase (CREON 24,000) 24,000-76,000 -120,000 unit capsule 2/22/2021 at Unknown time Transfer Papers No Yes Sig: Take 1 Cap by mouth three (3) times daily (with meals). loperamide (IMODIUM) 2 mg capsule   No Yes Sig: Take 2 Caps by mouth two (2) times daily as needed for Diarrhea.  
magnesium oxide (MAG-OX) 400 mg tablet 2/22/2021 at Unknown time Transfer Papers Yes Yes Sig: Take 400 mg by mouth nightly. mirtazapine (REMERON) 15 mg tablet 2/22/2021 at Unknown time Transfer Papers Yes Yes Sig: Take 15 mg by mouth nightly. mometasone-formoterol (DULERA) 200-5 mcg/actuation HFA inhaler 2/22/2021 at Unknown time Transfer Papers Yes Yes Sig: Take 2 Puffs by inhalation two (2) times a day. montelukast (Singulair) 10 mg tablet 2/22/2021 at Unknown time Transfer Papers Yes Yes Sig: Take 10 mg by mouth every evening. ondansetron hcl (Zofran) 4 mg tablet  Transfer Papers Yes Yes Sig: Take 4 mg by mouth every six (6) hours as needed for Nausea or Vomiting. pantoprazole (PROTONIX) 40 mg tablet 2/22/2021 at Unknown time Transfer Papers No Yes Sig: Take 1 Tab by mouth Daily (before breakfast). phosphorus (K PHOS NEUTRAL) 250 mg tablet 2/22/2021 at Unknown time Transfer Papers No Yes Sig: Take 1 Tab by mouth two (2) times a day. polyethylene glycol (Miralax) 17 gram/dose powder  Transfer Papers Yes Yes Sig: Take 17 g by mouth daily as needed for Constipation. potassium chloride SR (K-TAB) 20 mEq tablet 2/22/2021 at Unknown time  No Yes Sig: Take 1 Tab by mouth two (2) times a day. Recheck potassium regularly  
predniSONE (DELTASONE) 20 mg tablet 2/22/2021 at Unknown time Transfer Papers No Yes Sig: Take 20 mg by mouth daily (with breakfast). sAXagliptin (ONGLYZA) 5 mg tab tablet 2/22/2021 at Unknown time Transfer Papers Yes Yes Sig: Take 5 mg by mouth Daily (before dinner). tiotropium bromide (Spiriva Respimat) 2.5 mcg/actuation inhaler 2/22/2021 at Unknown time Transfer Papers Yes Yes Sig: Take 2 Puffs by inhalation daily.   
  
Facility-Administered Medications: None  
 
  
 
 
 
Justin Miller, PHARMD

## 2021-02-23 NOTE — CONSULTS
Jessica Carpio MD., [facility] [address] [place] [address] [place], [address] [facility] Office [phone],BSF [phone]  
 
 
 
 
2/23/2021 Admit Date: 2/22/2021 Dyke Moritz is a 70 y.o. female admitted for Symptomatic anemia [D64.9];GIB (gastrointestinal bleeding) [K92.2]; Acute blood loss anemia [D62]; Dyspnea [R06.00]. Consult requested by Ricardo Paul MD    
 
Assessment/Plan: 
 
Atrial fibrillation - Paroxysmal 
Chronic anticoagulation Anemia Dyspnea History of CAD Acute on Chronic congestive/diastolic heart failure History of GI bleed History of COPD-chronic respiratory failure-2 L nasal cannula History of PAD s/p femoropopliteal bypass CKD History of diabetes mellitus Hx of DVT Covid PUI Plan: 
Eliquis restarted after discussion with GI Blood pressure low to start AV node blocking agents. On Cardizem/amiodarone-PTA. Amiodarone gtt. IV diuretics as tolerated Can use digoxin if needed. Undergoing blood transfusion. Please do not hesitate to contact us with questions or concerns. See note below for details. Jessica Carpio MD 
 
 
Cardiac Testing/Procedures: A. Cardiac Cath/PCI: 6/6/18 At [address] [facility] - LAD stent/PTCA of D1 
  
10/9/15 [address]: Medl;Nml 
  
LAD: Prox- Med; 50%; Distal - small; D1 - Small to med - prox 60% 
  
LCflex: Large; Tortuous; MLI; GUILLERMO - med - MLI 
  
RCA: Med; Prox 80%; Distal 95% just before bifurcation into small PDA and PLB 
  
LVEDP: 22 
  
LVEF: 55%/ Mild basal inf hypokinesis 
  
No significant gradient across aortic valve. 
  
PCI: JR4 guide/BMW Pre dil with 2 by 12 balloon BMS 2.25 by 22 deployed at high [place] distally BMS 2.5 by 18 deployed at high [place] proximal lesion Post dil with 2.5 by 15 B. ECHO/MEE: 1/29/21 Pericardium: There is a moderate left pleural effusion. · LV: Estimated LVEF is 60 - 65%. Normal cavity size, wall thickness and systolic function (ejection fraction normal). TV: Mild tricuspid valve regurgitation is present. 6/2018 - Left ventricle: Systolic function was normal. Ejection fraction was 
estimated to be 60 %. There were no regional wall motion abnormalities. Doppler parameters were consistent with abnormal left ventricular 
relaxation (grade 1 diastolic dysfunction). Aortic valve: Leaflets exhibited normal cuspal separation and good 
mobility. Not well visualized. 
  
 10/11/15 - Left ventricle: Systolic function was vigorous. Ejection fraction was 
estimated in the range of 65 % to 70 %. There were no regional wall motion 
abnormalities. 
  
C. StressNuclear/Stress ECHO/Stress test: 
  
D. Vascular: 11/4/20 Technically difficult study due to bilatreral limb swelling and patient body habitus. Chronic non-occlusive thrombus present in the right common femoral vein. Bilateral lower extremity venous duplex positive for deep venous thrombosis. Negative for thrombophlebitis 
  
E. EP: 
  
F. Miscellaneous: 
 
History:  
 
Patient  is a 70 y.o. female admitted for dyspnea/anemia. History of diabetes mellitus 2, CAD, COPD-on 3 L nasal cannula, paroxysmal atrial fibrillation, chronic anticoagulation, HAYES, hypertension who was recently discharged to rehab. She was previously admitted for pneumonia, acute on chronic CHF, atrial fibrillation. No chest pain. Dyspnea present. Swelling lower extremities present. She was found to have low hemoglobin at the rehab center but when checked in the ED hemoglobin was 7.2 (?  Baseline). Also her oxygen requirement was the same as in the rehab center-3 L nasal cannula. She was found to have FOBT positive. PMH/PSH/FH/Soc Hx:  
 
Past Medical History:  
Diagnosis Date  Asthma  Atrial fibrillation ([facility] [address] 11/16/2018  Autoimmune disease ([facility] [address] [facility]   
 fibromyalgia  CAD (coronary artery disease) 10/9/2015  Closed wedge compression fracture of T7 vertebra ([facility] [address] 11/13/2018  COPD (chronic obstructive pulmonary disease) (HCC)  Diabetes ([facility] [address] [facility]  DVT (deep vein thrombosis) in pregnancy  GERD (gastroesophageal reflux disease)  Heart failure ([facility]  History of vascular access device 09/30/2020  
 4f midline, 12cm at 1cm out placed in L Cephalic by Nanci Maldonado RN  
 HTN (hypertension)  NSTEMI (non-ST elevated myocardial infarction) ([facility] [address] [facility] 10/9/2015  Obesity (BMI 30-39.9) 11/13/2018  PAD (peripheral artery disease) ([facility] [facility] prior stenting  Sleep apnea   
 wears CPAP  
 Statin intolerance Past Surgical History:  
Procedure Laterality Date  COLONOSCOPY N/A 1/4/2021 COLONOSCOPY performed by Jan Ellsworth MD at [address]  HX COLOSTOMY    
 and reversal, post episiotomy repair [id number]  HX UROLOGICAL  2009  
 bladder sling  IR KYPHOPLASTY LUMBAR  10/8/2020  MO ABDOMEN SURGERY PROC UNLISTED    
 hital hernia repair, gall bladder removed  MO AMPUTATION TRANSMETACARPAL Right 06/12/2019  
 entire ring finger, 2nd & 3rd fingers (transmetacarpal)  MO BREAST SURGERY PROCEDURE UNLISTED    
 lumpectomy  MO CARDIAC SURG PROCEDURE UNLIST  10/9/15  
 2 stents Allergies Allergen Reactions  Advair Diskus [Fluticasone Propion-Salmeterol] Rash  Aspartame Other (comments)  Breo Ellipta [Fluticasone Furoate-Vilanterol] Rash  Bumex [Bumetanide] Myalgia  Ciprofloxacin Rash  Statins-Hmg-Coa Reductase Inhibitors Other (comments) Muscle pain Lipitor/crestor/zocor  Sulfa (Sulfonamide Antibiotics) Rash  Tetracycline Other (comments) musclepain  Torsemide Rash and Myalgia  Zetia [Ezetimibe] Myalgia Family History Problem Relation Age of Onset  Diabetes Mother  Heart Failure Mother  Kidney Disease Mother  Diabetes Father  Heart Disease Father  Elevated Lipids Father  Heart Failure Father  Heart Disease Brother  Cancer Brother   
     kidney  Diabetes Brother  No Known Problems Brother  No Known Problems Brother Social History Tobacco Use  Smoking status: Former Smoker Quit date: 3/30/2017 Years since quitting: 3.9  Smokeless tobacco: Never Used Substance Use Topics  Alcohol use: No  
  Alcohol/week: 0.0 standard drinks Comment: very very rarely  Drug use: No  
 
 
 
 
Medications:  
 
 
Current Facility-Administered Medications Medication Dose Route Frequency  pantoprazole (PROTONIX) 40 mg in 0.9% sodium chloride 10 mL injection  40 mg IntraVENous Q12H  
 sodium chloride (NS) flush 5-40 mL  5-40 mL IntraVENous Q8H  
 sodium chloride (NS) flush 5-40 mL  5-40 mL IntraVENous PRN  
 acetaminophen (TYLENOL) tablet 650 mg  650 mg Oral Q6H PRN Or  
 acetaminophen (TYLENOL) suppository 650 mg  650 mg Rectal Q6H PRN  polyethylene glycol (MIRALAX) packet 17 g  17 g Oral DAILY PRN  
 bisacodyL (DULCOLAX) suppository 10 mg  10 mg Rectal DAILY PRN  
 ondansetron (ZOFRAN) injection 4 mg  4 mg IntraVENous Q6H PRN  
 glucose chewable tablet 16 g  4 Tab Oral PRN  
 dextrose (D50W) injection syrg 12.5-25 g  25-50 mL IntraVENous PRN  
 glucagon (GLUCAGEN) injection 1 mg  1 mg IntraMUSCular PRN  
 insulin lispro (HUMALOG) injection   SubCUTAneous Q6H  
 0.9% sodium chloride infusion 250 mL  250 mL IntraVENous PRN  
 amiodarone (CORDARONE) 150 mg in dextrose 5% 100 mL bolus infusion  150 mg IntraVENous ONCE  
 amiodarone (CORDARONE) 375 mg in dextrose 5% 250 mL infusion  0.5-1 mg/min IntraVENous TITRATE  furosemide (LASIX) injection 20 mg  20 mg IntraVENous ONCE  
 apixaban (ELIQUIS) tablet 5 mg  5 mg Oral BID Review of Systems:  
 
Limited As in HPI - all other ROS negative (bold if positive, if negative) Physical Exam:  
 
 
Visit Vitals BP (!) 94/59 Pulse (!) 113 Temp 97.8 °F (36.6 °C) Resp 16 Ht 5' 7\" (1.702 m) Wt 236 lb (107 kg) SpO2 100% BMI 36.96 kg/m² Telemetry:  
 
Limited secondary to Covid isolation Gen: Well-developed, well-nourished, in no acute distress Neck:  
Resp:  
Card:  
Abd: MSK: No cyanosis Skin: No rashes Neuro:  
Psych:   
LE:  Edema+ EKG:  
EKG Results Procedure 720 Value Units Date/Time EKG, 12 LEAD, INITIAL [[id number]] Collected: 02/22/21 2220 Order Status: Completed Updated: 02/23/21 1442 Ventricular Rate 86 BPM   
  Atrial Rate 86 BPM   
  P-R Interval 178 ms QRS Duration 90 ms Q-T Interval 372 ms QTC Calculation (Bezet) 445 ms Calculated P Axis 34 degrees Calculated R Axis -28 degrees Calculated T Axis 23 degrees Diagnosis --  
  Sinus rhythm with premature atrial complexes Low voltage QRS Borderline ECG When compared with ECG of 13-JAN-2021 12:41, 
premature atrial complexes are now present Confirmed by Melvi Ellison ([id number]) on 2/23/2021 2:42:44 PM 
  
  
 
 
 
 
 
Cxray: Reviewed LABS: Reviewed Care Plan discussed with: Nursing Staff Total time:      mins Rema Orozco MD

## 2021-02-23 NOTE — NURSE NAVIGATOR
Chart reviewed by Heart Failure Nurse Navigator. Heart Failure database completed. Patient in on HF Bundle from index admission 1/26/21 to 2/9/21 with pneumonia, acute on chronic HFpEF, and anemia. She returned to [facility] for rehab. She was sent to the ED 2/15 for anemia and was stable and discharged from ED back to [facility]. She returned to ED on 2/22 for anemia with an increase in shortness of breath. Readmission Risk Score is 49. EF:  60 to 65% with normal cavity size, wall thickness, and systolic function, and mild tricuspid regurgitation. ACEi/ARB/ARNi: Not indicated. BB: Not indicated. Aldosterone Antagonist: Not indicated. Obstructive Sleep Apnea Screening: STOP-BANG score: 
 Referred to Sleep Medicine: CRT Not indicated. NYHA Functional Class **. Heart Failure Teach Back in Patient Education. Heart Failure Avoiding Triggers on Discharge Instructions. Cardiologist: Dr. Chavez Covert Post discharge follow up phone call to be made within 48-72 hours of discharge.

## 2021-02-23 NOTE — ED NOTES
Multiple attempts made to contact Dr. Jennifer Agustin regarding possible PICC or midline due to pt being difficult stick.

## 2021-02-23 NOTE — PROGRESS NOTES
Spoke Dr London Nation regarding resumption of eliquis. Hgb stable and does not appear she actively bleeding. Recent egd and colonoscopy. Risk outweighs benefit for embolus CvA Safe to resume from a GI standpoint Appreciate recommendations

## 2021-02-23 NOTE — ED NOTES
Cleaned patient bedding at this time. Pt. Given new gown. Attempted another IV stick without success.

## 2021-02-23 NOTE — PROGRESS NOTES
Consult received for cardiology for recommendations resuming eliquis , patient w hx of PAF The patient is well known to our practice, recently discharged from the hospital 2/9/21. The patient presented to the ED w the chief compliant of anemia and SOB 2/22/21 
hgb at rehab 6.5 on 2/15, she came to the ED at that time, but was found to be stable on home O2 and d/c back to [facility] rehab. Hgb now 7.2 Occult blood + this admission, also noted to be positive 1/2021 On amiodarone and dilt PO for PAF- currently on hold d/t hypotension Chest xray showing mild pulmonary edema, bibasilar atelectasis and small bilateral pleural effusions. PAF 
GI bleed Anemia EDWARDS Chronic resp failure on 3 L home O2 Chronic HFpEF 
COVId PUI 
 
 
-eliquis currently on hold, recommend GI evaluation and appreciate recommendations regarding resumption of eliquis. This seems to be a chronic problem. Trx for hgb <7 
-[id number] - weight risk and benefit of continuing [facility], could consider watchman device in the future (outpatient). - patient's admission ECG nsr w PACs Telemetry shows -120's occ 130's. BP soft hesitant to restart av skyler agents  
-will start amiodarone bolus and gtt 
-patient currently getting blood and has poor IV access- (1 midline). - Hopefully transfusion will help with HR. GIVE iV LASIX POST TRANSFUSION  
- -will have to trx to 3rd floor to start amiodarone gtt Will resume PO meds once BP is more stable D/w nursing Patient resting in bed- NAD noted FULL consult note to follow

## 2021-02-23 NOTE — DISCHARGE INSTRUCTIONS
Patient Education        Patient Discharge Instructions    Alexandria Lee / [id number] : 1945    Admitted 2021 Discharged: 3/5/2021 7:56 AM     ACUTE DIAGNOSES:  Symptomatic anemia [D64.9]  GIB (gastrointestinal bleeding) [K92.2]  Acute blood loss anemia [D62]  Dyspnea [R06.00]    CHRONIC MEDICAL DIAGNOSES:  Problem List as of 3/5/2021 Date Reviewed: 2020          Codes Class Noted - Resolved    Elevated brain natriuretic peptide (BNP) level ICD-10-CM: R79.89  ICD-9-CM: 790.99  2021 - Present        Acute blood loss anemia ICD-10-CM: D62  ICD-9-CM: 285.1  2021 - Present        Dyspnea ICD-10-CM: R06.00  ICD-9-CM: 786.09  2021 - Present        GIB (gastrointestinal bleeding) ICD-10-CM: K92.2  ICD-9-CM: 578.9  2021 - Present        Symptomatic anemia ICD-10-CM: D64.9  ICD-9-CM: 285.9  2021 - Present        Pneumonia ICD-10-CM: J18.9  ICD-9-CM: 578  2021 - Present        SOB (shortness of breath) ICD-10-CM: R06.02  ICD-9-CM: 786.05  2021 - Present        Anemia ICD-10-CM: D64.9  ICD-9-CM: 285.9  2021 - Present        Acute on chronic systolic CHF (congestive heart failure) (HCC) ICD-10-CM: I50.23  ICD-9-CM: 428.23, 428.0  2021 - Present        Uncontrolled type 2 diabetes mellitus with hyperglycemia (HCC) ICD-10-CM: E11.65  ICD-9-CM: 250.02  2021 - Present        Left lower lobe pneumonia ICD-10-CM: J18.9  ICD-9-CM: 486  2021 - Present        Leukocytosis ICD-10-CM: D72.829  ICD-9-CM: 288.60  11/3/2020 - Present        Chronic respiratory failure with hypoxia (HCC) ICD-10-CM: J96.11  ICD-9-CM: 518.83, 799.02  2020 - Present    Overview Signed 2020 10:12 PM by Saúl Mcbride MD     On 3L NC at home              Cellulitis of lower extremity ICD-10-CM: L03.119  ICD-9-CM: 682.6  3/23/2020 - Present        ASO (arteriosclerosis obliterans) ICD-10-CM: I70.90  ICD-9-CM: 440.9  2019 - Present        PVD (peripheral vascular disease) ([facility] ICD-10-CM: I73.9  ICD-9-CM: 443.9  8/1/2019 - Present        Severe obesity ([facility] ICD-10-CM: E66.01  ICD-9-CM: 278.01  7/30/2019 - Present        COPD (chronic obstructive pulmonary disease) ([place] [address] ICD-10-CM: J44.9  ICD-9-CM: 496  7/13/2019 - Present        Normocytic anemia ICD-10-CM: D64.9  ICD-9-CM: 285.9  7/13/2019 - Present        PAD (peripheral artery disease) ([place] [address].) ICD-10-CM: I73.9  ICD-9-CM: 443.9  7/13/2019 - Present        Mild intermittent asthma ICD-10-CM: J45.20  ICD-9-CM: 493.90  5/17/2019 - Present        Brachial artery thrombus (HCC) ICD-10-CM: I74.2  ICD-9-CM: 444.21  4/26/2019 - Present        Sleep apnea ICD-10-CM: G47.30  ICD-9-CM: 780.57  1/5/2019 - Present    Overview Signed 1/5/2019  8:41 PM by Rozelle Clock, DO     wears CPAP             Atrial fibrillation ([place] [address].) ICD-10-CM: I48.91  ICD-9-CM: 427.31  11/16/2018 - Present        Obesity (BMI 30-39.9) (Chronic) ICD-10-CM: E66.9  ICD-9-CM: 278.00  11/13/2018 - Present        Recurrent deep vein thrombosis (DVT) ([facility] ICD-10-CM: I82.409  ICD-9-CM: 453.40  3/30/2018 - Present        Chronic anticoagulation (Chronic) ICD-10-CM: Z79.01  ICD-9-CM: V58.61  3/30/2018 - Present        Essential hypertension (Chronic) ICD-10-CM: I10  ICD-9-CM: 401.9  1/22/2016 - Present        CAD (coronary artery disease) ICD-10-CM: I25.10  ICD-9-CM: 414.00  10/9/2015 - Present        Type II diabetes mellitus (HCC) (Chronic) ICD-10-CM: E11.9  ICD-9-CM: 250.00  10/9/2015 - Present        RESOLVED: Syncope and collapse ICD-10-CM: R55  ICD-9-CM: 780.2  9/29/2020 - 11/2/2020        RESOLVED: Cellulitis ICD-10-CM: L03.90  ICD-9-CM: 682.9  3/23/2020 - 5/29/2020        RESOLVED: Hypokalemia ICD-10-CM: E87.6  ICD-9-CM: 276.8  3/23/2020 - 5/29/2020        RESOLVED: Hyponatremia ICD-10-CM: E87.1  ICD-9-CM: 276.1  3/23/2020 - 5/29/2020        RESOLVED: Diarrhea ICD-10-CM: R19.7  ICD-9-CM: 787.91  3/23/2020 - 5/29/2020        RESOLVED: Syncope and collapse ICD-10-CM: R55  ICD-9-CM: 780.2  7/13/2019 - 5/29/2020        RESOLVED: UTI (urinary tract infection) ICD-10-CM: N39.0  ICD-9-CM: 599.0  7/13/2019 - 11/2/2020        RESOLVED: Sepsis ([facility] ICD-10-CM: A41.9  ICD-9-CM: 038.9, 995.91  7/13/2019 - 11/3/2020        RESOLVED: Leg wound, left ICD-10-CM: T27.458B  ICD-9-CM: 891.0  7/13/2019 - 5/29/2020        RESOLVED: Leg laceration, right, initial encounter ICD-10-CM: Y44.244T  ICD-9-CM: 894.0  7/13/2019 - 5/29/2020        RESOLVED: Cellulitis of right upper arm ICD-10-CM: L03.113  ICD-9-CM: 682.3  5/17/2019 - 5/29/2020        RESOLVED: Gangrene of finger of right hand ([facility] ICD-10-CM: I96  ICD-9-CM: 785.4  5/17/2019 - 11/2/2020        RESOLVED: Bowel obstruction ([facility] ICD-10-CM: F13.933  ICD-9-CM: 560.9  1/8/2019 - 5/29/2020        RESOLVED: Partial small bowel obstruction ([facility] ICD-10-CM: K56.600  ICD-9-CM: 560.9  1/5/2019 - 5/29/2020        RESOLVED: Dyspnea ICD-10-CM: R06.00  ICD-9-CM: 786.09  11/13/2018 - 5/29/2020        RESOLVED: ARF (acute renal failure) ([facility] ICD-10-CM: N17.9  ICD-9-CM: 584.9  11/13/2018 - 5/29/2020        RESOLVED: Closed wedge compression fracture of T7 vertebra ([facility] ICD-10-CM: X22.691I  ICD-9-CM: 805.2  11/13/2018 - 5/29/2020        RESOLVED: Type 2 diabetes with nephropathy ([facility] ICD-10-CM: E11.21  ICD-9-CM: 250.40, 583.81  10/1/2018 - 11/2/2020        RESOLVED: Chest pain ICD-10-CM: R07.9  ICD-9-CM: 786.50  6/27/2018 - 5/29/2020        RESOLVED: Abdominal pain ICD-10-CM: R10.9  ICD-9-CM: 789.00  6/27/2018 - 5/29/2020        RESOLVED: Coronary artery disease involving native coronary artery without angina pectoris (Chronic) ICD-10-CM: I25.10  ICD-9-CM: 414.01  1/22/2016 - 11/2/2020        RESOLVED: HTN (hypertension) ICD-10-CM: I10  ICD-9-CM: 401.9  10/9/2015 - 1/22/2016        RESOLVED: NSTEMI (non-ST elevated myocardial infarction) ([facility] ICD-10-CM: I21.4  ICD-9-CM: 410.70  10/9/2015 - 5/29/2020              DISCHARGE MEDICATIONS: · It is important that you take the medication exactly as they are prescribed. · Keep your medication in the bottles provided by the pharmacist and keep a list of the medication names, dosages, and times to be taken in your wallet. · Do not take other medications without consulting your doctor. DIET:  Regular Diet  ACTIVITY: Activity as tolerated    ADDITIONAL INFORMATION: If you experience any of the following symptoms then please call your primary care physician or return to the emergency room if you cannot get hold of your doctor: Fever, chills, nausea, vomiting, diarrhea, change in mentation, falling, bleeding, shortness of breath. FOLLOW UP CARE:  Hospice    Follow-up with specialists at directed by them      Information obtained by :  I understand that if any problems occur once I am at home I am to contact my physician. I understand and acknowledge receipt of the instructions indicated above. Physician's or R.N.'s Signature                                                                  Date/Time                                                                                                                                              Patient or Representative Signature                                                          Date/Time        Avoiding Triggers With Heart Failure: Care Instructions  Your Care Instructions     Triggers are anything that make your heart failure flare up. A flare-up is also called \"sudden heart failure\" or \"acute heart failure. \" When you have a flare-up, fluid builds up in your lungs, and you have problems breathing. You might need to go to the hospital. By watching for changes in your condition and avoiding triggers, you can prevent heart failure flare-ups. Follow-up care is a key part of your treatment and safety.  Be sure to make and go to all appointments, and call your doctor if you are having problems. It's also a good idea to know your test results and keep a list of the medicines you take. How can you care for yourself at home? Watch for changes in your weight and condition  · Weigh yourself without clothing at the same time each day. Record your weight. Call your doctor if you have sudden weight gain, such as more than 2 to 3 pounds in a day or 5 pounds in a week. (Your doctor may suggest a different range of weight gain.) A sudden weight gain may mean that your heart failure is getting worse. · Keep a daily record of your symptoms. Write down any changes in how you feel, such as new shortness of breath, cough, or problems eating. Also record if your ankles are more swollen than usual and if you feel more tired than usual. Note anything that you ate or did that could have triggered these changes. Limit sodium  Sodium causes your body to hold on to extra water. This may cause your heart failure symptoms to get worse. People get most of their sodium from processed foods. Fast food and restaurant meals also tend to be very high in sodium. · Your doctor may suggest that you limit sodium. Your doctor can tell you how much sodium is right for you. This includes limiting sodium in cooked and packaged foods. · Read food labels on cans and food packages. They tell you how much sodium you get in one serving. Check the serving size. If you eat more than one serving, you are getting more sodium. · Be aware that sodium can come in forms other than salt, including monosodium glutamate (MSG), sodium citrate, and sodium bicarbonate (baking soda). MSG is often added to Asian food. You can sometimes ask for food without MSG or salt. · Slowly reducing salt will help you adjust to the taste. Take the salt shaker off the table. · Flavor your food with garlic, lemon juice, onion, vinegar, herbs, and spices instead of salt.  Do not use soy sauce, steak sauce, onion salt, garlic salt, mustard, or ketchup on your food, unless it is labeled \"low-sodium\" or \"low-salt.\"  · Make your own salad dressings, sauces, and ketchup without adding salt.  · Use fresh or frozen ingredients, instead of canned ones, whenever you can. Choose low-sodium canned goods.  · Eat less processed food and food from restaurants, including fast food.  Exercise as directed  Moderate, regular exercise is very good for your heart. It improves your blood flow and helps control your weight. But too much exercise can stress your heart and cause a heart failure flare-up.  · Check with your doctor before you start an exercise program.  · Walking is an easy way to get exercise. Start out slowly. Gradually increase the length and pace of your walk. Swimming, riding a bike, and using a treadmill are also good forms of exercise.  · When you exercise, watch for signs that your heart is working too hard. You are pushing yourself too hard if you cannot talk while you are exercising. If you become short of breath or dizzy or have chest pain, stop, sit down, and rest.  · Do not exercise when you do not feel well.  Take medicines correctly  · Take your medicines exactly as prescribed. Call your doctor if you think you are having a problem with your medicine.  · Make a list of all the medicines you take. Include those prescribed to you by other doctors and any over-the-counter medicines, vitamins, or supplements you take. Take this list with you when you go to any doctor.  · Take your medicines at the same time every day. It may help you to post a list of all the medicines you take every day and what time of day you take them.  · Make taking your medicine as simple as you can. Plan times to take your medicines when you are doing other things, such as eating a meal or getting ready for bed. This will make it easier to remember to take your medicines.  · Get organized. Use helpful tools, such as  daily or weekly pill containers. When should you call for help? Call 911 if you have symptoms of sudden heart failure such as:    · You have severe trouble breathing.     · You cough up pink, foamy mucus.     · You have a new irregular or rapid heartbeat. Call your doctor now or seek immediate medical care if:    · You have new or increased shortness of breath.     · You are dizzy or lightheaded, or you feel like you may faint.     · You have sudden weight gain, such as more than 2 to 3 pounds in a day or 5 pounds in a week. (Your doctor may suggest a different range of weight gain.)     · You have increased swelling in your legs, ankles, or feet.     · You are suddenly so tired or weak that you cannot do your usual activities. Watch closely for changes in your health, and be sure to contact your doctor if you develop new symptoms. Where can you learn more? Go to http://www.gray.com/  Enter V089 in the search box to learn more about \"Avoiding Triggers With Heart Failure: Care Instructions. \"  Current as of: December 16, 2019               Content Version: 12.6  © 8409-4152 [employer]. Care instructions adapted under license by [employer] (which disclaims liability or warranty for this information). If you have questions about a medical condition or this instruction, always ask your healthcare professional. [address] any warranty or liability for your use of this information. 4

## 2021-02-23 NOTE — ED PROVIDER NOTES
Date: 2/22/2021 
Patient Name: Sherry Jalloh 
 
History of Presenting Illness  
 
Chief Complaint  
Patient presents with  
• Abnormal Lab Results  
• Shortness of Breath  
 
 
History Provided By: Patient 
 
HPI: Sherry Jalloh, 75 y.o. female with a past medical history of diabetes, myocardial infarction, deep vein thrombosis and COPD, atrial fibrillation presents to the ED with cc of SOB since yesterday afternoon. Pt states that she is coming from a rehab facility where she has been for the past 2 weeks. She was transferred there after being admitted at [facility] on 1/26 for anemia, pneumonia, and acute on chronic systolic heart failure. She was also seen in the ER on 2/15 for anemia and was transfused at that time. She has had a history of chronic anemia of unknown etiology and states she recently had a colonoscopy and endoscopy on Jan 4th which was negative. She is on Eliquis for her Afib. She was sent here this evening because the doctor at the rehab center thought she heard fluid in her lungs, and CXR showed bilateral opacities, R>L. She is normally on 3 L NC at baseline and they have had to increase it to 4L tonight. She denies any fevers, cough, wheezing, chest pain, n/v, abdominal pain. Her nurses noted that she had some dark stool several days ago. She has baseline edema that is not significantly worse than usual.  
 
There are no other complaints, changes, or physical findings at this time. 
 
PCP: Shubham Bajwa MD 
 
No current facility-administered medications on file prior to encounter.   
 
Current Outpatient Medications on File Prior to Encounter  
Medication Sig Dispense Refill  
• loperamide (IMODIUM) 2 mg capsule Take 2 Caps by mouth two (2) times daily as needed for Diarrhea. 30 Cap 0  
• amiodarone (CORDARONE) 200 mg tablet Take 1 Tab by mouth daily. 60 Tab 0  
• dilTIAZem ER (CARDIZEM CD) 300 mg capsule Take 1 Cap by mouth daily. 30 Cap 0  
  furosemide (LASIX) 40 mg tablet Take 1 Tab by mouth daily. 30 Tab 0  
 potassium chloride SR (K-TAB) 20 mEq tablet Take 1 Tab by mouth two (2) times a day. Recheck potassium regularly 30 Tab 0  
 ondansetron hcl (Zofran) 4 mg tablet Take 4 mg by mouth every six (6) hours as needed for Nausea or Vomiting.  polyethylene glycol (Miralax) 17 gram/dose powder Take 17 g by mouth daily as needed for Constipation.  predniSONE (DELTASONE) 20 mg tablet Take 20 mg by mouth daily (with breakfast). 30 Tab 0  
 lipase-protease-amylase (CREON 24,000) 24,000-76,000 -120,000 unit capsule Take 1 Cap by mouth three (3) times daily (with meals). 90 Cap 0  
 pantoprazole (PROTONIX) 40 mg tablet Take 1 Tab by mouth Daily (before breakfast). 30 Tab 0  phosphorus (K PHOS NEUTRAL) 250 mg tablet Take 1 Tab by mouth two (2) times a day. 60 Tab 0  
 aspirin 81 mg chewable tablet Take 81 mg by mouth daily.  apixaban (ELIQUIS) 5 mg tablet Take 5 mg by mouth two (2) times a day.  alendronate (FOSAMAX) 70 mg tablet Take 70 mg by mouth every seven (7) days. On Sundays  zinc oxide 20 % ointment Apply  to affected area three (3) times daily. Bilateral gluteal and perianal area each shift  honey (MediHoney, honey,) 100 % topical paste Apply  to affected area daily.  cyanocobalamin 1,000 mcg tablet Take 1 Tab by mouth daily. 30 Tab 0  
 ferrous sulfate 325 mg (65 mg iron) tablet Take 1 Tab by mouth daily (with breakfast). 30 Tab 0  
 tiotropium bromide (Spiriva Respimat) 2.5 mcg/actuation inhaler Take 2 Puffs by inhalation daily.  montelukast (Singulair) 10 mg tablet Take 10 mg by mouth every evening.  sAXagliptin (ONGLYZA) 5 mg tab tablet Take 5 mg by mouth Daily (before dinner).  acetaminophen (TYLENOL) 325 mg tablet Take 1 Tab by mouth every four (4) hours as needed for Pain. 30 Tab 0  
 magnesium oxide (MAG-OX) 400 mg tablet Take 400 mg by mouth nightly. • mirtazapine (REMERON) 15 mg tablet Take 15 mg by mouth nightly.    
• albuterol (PROVENTIL VENTOLIN) 2.5 mg /3 mL (0.083 %) nebulizer solution 2.5 mg by Nebulization route every six (6) hours as needed for Wheezing or Shortness of Breath.    
• albuterol (PROAIR HFA) 90 mcg/actuation inhaler Take 2 Puffs by inhalation every four (4) hours as needed.    
• lactobacillus rhamnosus gg 10 billion cell (CULTURELLE) 10 billion cell capsule Take 1 Cap by mouth daily.    
• biotin 10,000 mcg cap Take 1 Cap by mouth daily.    
• DULoxetine (CYMBALTA) 60 mg capsule Take 60 mg by mouth daily.    
• cholecalciferol, vitamin D3, (Vitamin D3) 50 mcg (2,000 unit) tab Take 2,000 Units by mouth daily.    
• mometasone-formoterol (DULERA) 200-5 mcg/actuation HFA inhaler Take 2 Puffs by inhalation two (2) times a day.    
 
 
Past History  
 
Past Medical History: 
Past Medical History:  
Diagnosis Date  
• Asthma   
• Atrial fibrillation ([facility]) 2018  
• Autoimmune disease ([facility])   
 fibromyalgia  
• CAD (coronary artery disease) 10/9/2015  
• Closed wedge compression fracture of T7 vertebra ([facility]) 2018  
• COPD (chronic obstructive pulmonary disease) ([facility])   
• Diabetes ([facility])   
• DVT (deep vein thrombosis) in pregnancy   
• GERD (gastroesophageal reflux disease)   
• Heart failure ([facility])   
• History of vascular access device 2020  
 4f midline, 12cm at 1cm out placed in L Cephalic by OVIDIO Akins RN  
• HTN (hypertension)   
• NSTEMI (non-ST elevated myocardial infarction) ([facility]) 10/9/2015  
• Obesity (BMI 30-39.9) 2018  
• PAD (peripheral artery disease) ([facility])   
 prior stenting   
• Sleep apnea   
 wears CPAP  
• Statin intolerance   
 
 
Past Surgical History: 
Past Surgical History:  
Procedure Laterality Date  
• COLONOSCOPY N/A 2021  
 COLONOSCOPY performed by Barry Dennis MD at [facility] ENDOSCOPY  
• HX  SECTION    
• HX COLOSTOMY    
 and reversal, post episiotomy repair  
 [address]  HX UROLOGICAL  2009  
 bladder sling  IR KYPHOPLASTY LUMBAR  10/8/2020  OH ABDOMEN SURGERY PROC UNLISTED    
 hital hernia repair, gall bladder removed  OH AMPUTATION TRANSMETACARPAL Right 06/12/2019  
 entire ring finger, 2nd & 3rd fingers (transmetacarpal)  OH BREAST SURGERY PROCEDURE UNLISTED    
 lumpectomy  [place] CARDIAC SURG PROCEDURE UNLIST  10/9/15  
 2 stents Family History: 
Family History Problem Relation Age of Onset  Diabetes Mother  Heart Failure Mother  Kidney Disease Mother  Diabetes Father  Heart Disease Father  Elevated Lipids Father  Heart Failure Father  Heart Disease Brother  Cancer Brother   
     kidney  Diabetes Brother  No Known Problems Brother  No Known Problems Brother Social History: 
Social History Tobacco Use  Smoking status: Former Smoker Quit date: 3/30/2017 Years since quitting: 3.9  Smokeless tobacco: Never Used Substance Use Topics  Alcohol use: No  
  Alcohol/week: 0.0 standard drinks Comment: very very rarely  Drug use: No  
 
 
Allergies: Allergies Allergen Reactions  Advair Diskus [Fluticasone Propion-Salmeterol] Rash  Aspartame Other (comments)  Breo Ellipta [Fluticasone Furoate-Vilanterol] Rash  Bumex [Bumetanide] Myalgia  Ciprofloxacin Rash  Statins-Hmg-Coa Reductase Inhibitors Other (comments) Muscle pain Lipitor/crestor/zocor  Sulfa (Sulfonamide Antibiotics) Rash  Tetracycline Other (comments) musclepain  Torsemide Rash and Myalgia  Zetia [Ezetimibe] Myalgia Review of Systems Review of Systems Constitutional: Negative for fatigue and fever. HENT: Negative. Eyes: Negative. Respiratory: Positive for shortness of breath. Negative for cough and wheezing. Cardiovascular: Negative for chest pain and leg swelling. Gastrointestinal: Negative for abdominal pain, blood in stool, constipation, diarrhea, nausea and vomiting. Endocrine: Negative. Genitourinary: Negative for difficulty urinating and dysuria. Musculoskeletal: Negative. Skin: Negative. Allergic/Immunologic: Negative. Neurological: Negative. Hematological: Bruises/bleeds easily. Psychiatric/Behavioral: Negative. Physical Exam  
Physical Exam 
Vitals signs and nursing note reviewed. Constitutional:   
   General: She is not in acute distress. Appearance: She is well-developed. HENT:  
   Head: Normocephalic and atraumatic. Eyes:  
   Conjunctiva/sclera: Conjunctivae normal.  
Neck: Musculoskeletal: Neck supple. Vascular: No JVD. Trachea: No tracheal deviation. Cardiovascular:  
   Rate and Rhythm: Normal rate and regular rhythm. Heart sounds: No murmur. No friction rub. No gallop. Pulmonary:  
   Effort: Pulmonary effort is normal. No respiratory distress. Breath sounds: No stridor. No wheezing. Comments: Pt is speaking in full sentences, no increased work of breathing. On 4L NC. Mild crackles in the RLL. Abdominal:  
   General: Bowel sounds are normal. There is no distension. Palpations: Abdomen is soft. There is no mass. Tenderness: There is no abdominal tenderness. There is no guarding. Musculoskeletal: Normal range of motion. General: No tenderness. Comments: No deformity. +2 pitting edema in bilateral LE. Skin: 
   General: Skin is warm and dry. Findings: No rash. Comments: Chronic skin changes over the bilateral anterior shins Neurological:  
   Mental Status: She is alert and oriented to person, place, and time. Comments: No focal deficits Psychiatric:     
   Behavior: Behavior normal.     
   Thought Content: Thought content normal.     
   Judgment: Judgment normal.  
 
 
 
Diagnostic Study Results Labs - 
  
 Recent Results (from the past 72 hour(s)) METABOLIC PANEL, COMPREHENSIVE Collection Time: 02/22/21 10:46 PM  
Result Value Ref Range Sodium 142 136 - 145 mmol/L Potassium 3.6 3.5 - 5.1 mmol/L Chloride 104 97 - 108 mmol/L  
 CO2 34 (H) 21 - 32 mmol/L Anion gap 4 (L) 5 - 15 mmol/L Glucose 140 (H) 65 - 100 mg/dL BUN 36 (H) 6 - 20 MG/DL Creatinine 1.25 (H) 0.55 - 1.02 MG/DL  
 BUN/Creatinine ratio 29 (H) 12 - 20 GFR est AA 51 (L) >60 ml/min/1.73m2 GFR est non-AA 42 (L) >60 ml/min/1.73m2 Calcium 7.5 (L) 8.5 - 10.1 MG/DL Bilirubin, total 0.4 0.2 - 1.0 MG/DL  
 ALT (SGPT) 27 12 - 78 U/L  
 AST (SGOT) 21 15 - 37 U/L Alk. phosphatase 188 (H) 45 - 117 U/L Protein, total 5.6 (L) 6.4 - 8.2 g/dL Albumin 2.4 (L) 3.5 - 5.0 g/dL Globulin 3.2 2.0 - 4.0 g/dL A-G Ratio 0.8 (L) 1.1 - 2.2    
CBC WITH AUTOMATED DIFF Collection Time: 02/22/21 10:46 PM  
Result Value Ref Range WBC 15.1 (H) 3.6 - 11.0 K/uL  
 RBC 2.58 (L) 3.80 - 5.20 M/uL HGB 7.2 (L) 11.5 - 16.0 g/dL HCT 25.3 (L) 35.0 - 47.0 % MCV 98.1 80.0 - 99.0 FL  
 MCH 27.9 26.0 - 34.0 PG  
 MCHC 28.5 (L) 30.0 - 36.5 g/dL  
 RDW 16.3 (H) 11.5 - 14.5 % PLATELET 053 585 - 338 K/uL MPV 10.0 8.9 - 12.9 FL  
 NRBC 0.1 (H) 0  WBC ABSOLUTE NRBC 0.02 (H) 0.00 - 0.01 K/uL NEUTROPHILS 89 (H) 32 - 75 % BAND NEUTROPHILS 1 0 - 6 % LYMPHOCYTES 5 (L) 12 - 49 % MONOCYTES 5 5 - 13 % EOSINOPHILS 0 0 - 7 % BASOPHILS 0 0 - 1 % IMMATURE GRANULOCYTES 0 %  
 ABS. NEUTROPHILS 13.5 (H) 1.8 - 8.0 K/UL  
 ABS. LYMPHOCYTES 0.8 0.8 - 3.5 K/UL  
 ABS. MONOCYTES 0.8 0.0 - 1.0 K/UL  
 ABS. EOSINOPHILS 0.0 0.0 - 0.4 K/UL  
 ABS. BASOPHILS 0.0 0.0 - 0.1 K/UL  
 ABS. IMM. GRANS. 0.0 K/UL  
 DF MANUAL    
 RBC COMMENTS ANISOCYTOSIS 1+ 
    
 RBC COMMENTS STOMATOCYTES PRESENT 
    
PTT Collection Time: 02/22/21 10:46 PM  
Result Value Ref Range aPTT 26.1 22.1 - 31.0 sec aPTT, therapeutic range     58.0 - 77.0 SECS  
PROTHROMBIN TIME + INR Collection Time: 02/22/21 10:46 PM  
Result Value Ref Range INR 1.1 0.9 - 1.1 Prothrombin time 11.1 9.0 - 11.1 sec TYPE & SCREEN Collection Time: 02/22/21 10:46 PM  
Result Value Ref Range Crossmatch Expiration 02/25/2021,2359 ABO/Rh(D) A POSITIVE Antibody screen NEG   
TROPONIN I Collection Time: 02/22/21 10:46 PM  
Result Value Ref Range Troponin-I, Qt. <0.05 <0.05 ng/mL NT-PRO BNP Collection Time: 02/22/21 10:46 PM  
Result Value Ref Range NT pro-BNP 1,550 (H) <125 PG/ML  
OCCULT BLOOD, STOOL Collection Time: 02/22/21 11:46 PM  
Result Value Ref Range Occult blood, stool Positive (A) NEG Radiologic Studies -  
XR CHEST PORT Final Result Of cardiomegaly with mild pulmonary edema, bibasilar atelectasis,  
and small bilateral pleural effusions. CT Results  (Last 48 hours) None CXR Results  (Last 48 hours) 02/22/21 2232  XR CHEST PORT Final result Impression:  Of cardiomegaly with mild pulmonary edema, bibasilar atelectasis,  
and small bilateral pleural effusions. Narrative:  INDICATION: Shortness of breath COMPARISON: 2/5/2021 FINDINGS: AP portable imaging of the chest performed at 10:26 PM demonstrates  
unchanged cardiomegaly. There is pulmonary vascular congestion with mild  
pulmonary interstitial edema. Mild bibasilar atelectasis and small bilateral  
pleural effusions are unchanged. Post kyphoplasty changes are again seen in the  
mid thoracic spine. Medical Decision Making I am the first provider for this patient. I reviewed the vital signs, available nursing notes, past medical history, past surgical history, family history and social history. Vital Signs-Reviewed the patient's vital signs. Patient Vitals for the past 12 hrs: 
 Temp Pulse Resp BP SpO2 02/22/21 2200 98 °F (36.7 °C) 85 12 (!) 115/92 100 % EKG interpretation: (Preliminary) EKG interpreted by me. Shows a normal sinus rhythm with PACs with a heart rate of 86. No ST elevations or depressions concerning for ischemia. Normal intervals. Records Reviewed: Nursing Notes and Old Medical Records Provider Notes (Medical Decision Making):  
Pt is a 69 yo F with a hx of anemia, atrial fibrillation on Eliquis and CHF. Pt has had multiple hospital visits recently and is now presenting with recurrent SOB. DDx includes anemia, acute GI bleed, CHF exacerbation, pneumonia. Pt will likely need admission. Will check labs, cardiac enzymes, ekg, CXR, coags. ED Course:  
Initial assessment performed. The patients presenting problems have been discussed, and they are in agreement with the care plan formulated and outlined with them. I have encouraged them to ask questions as they arise throughout their visit. ED Course as of Feb 23 0037 Mon Feb 22, 2021  
2351 Rectal exam performed. Dark stool collected. Mild hemorrhoids, no masses. Chaperoned by nurse Camden Samuel Feb 23, 2021  
0027 Updated patient on the plan of care. Patient denies any worsening shortness of breath, and her oxygen saturations have remained above 97% on her 3 L nasal cannula. [CK] ED Course User Index [CK] DO Irena Zamora Serve Consult for Admission 1:33 AM 
 
ED Room Number: NT42/93 Patient Name and age:  Ila Gonzales 70 y.o.  female Working Diagnosis:  
1. Gastrointestinal hemorrhage, unspecified gastrointestinal hemorrhage type COVID-19 Suspicion:  no 
Sepsis present:  no  Reassessment needed: no 
Code Status:  Full Code Readmission: yes Isolation Requirements:  yes Recommended Level of Care:  med/surg, telemetry Department:[facility] Adult ED - [phone] Other:   
 
 
 
Disposition: Admitted to Floor Medical Floor the case was discussed with the admitting physician Dr. Emily Leung Diagnosis Clinical Impression:  
1. Gastrointestinal hemorrhage, unspecified gastrointestinal hemorrhage type Attestations: 
 
Teodora Rodriguez, DO Please note that this dictation was completed with [employer]OMOR[employer], the computer voice recognition software. Quite often unanticipated grammatical, syntax, homophones, and other interpretive errors are inadvertently transcribed by the computer software. Please disregard these errors. Please excuse any errors that have escaped final proofreading. Thank you.

## 2021-02-23 NOTE — PROGRESS NOTES
Daily Progress Note: 2/23/2021 Jasmyn Kim MD 
 
Assessment/Plan:  
Acute blood loss anemia due to GIB / Symptomatic anemia:  Admit for further workup and treatment. NPO.  IVF. PPI. Supportive care. Type & Screen and transfuse 1 unit PRBCs, serial H&H, PT.INR.  GI consulted. Mechanical DVT prophylaxis only due to bleeding 
  
Acute on chronic systolic CHF /  Elevated BNP:   
-  Cardiology consulted.  
  
Dyspnea: currently improved to baseline 
  
COPD: Appears to be stable and at baseline with no exacerbation. Continue with baseline oxygen supplementation and increase as needed. Scheduled and as needed bronchodilators. 
  
Atrial fibrillation ([facility] [address] (11/16/2018) on Eliquis: Chronic but rate controlled. Given the GI bleed and anemia weigh risks and benefits of continuing Eliquis - for now stop. 
  
DM type 2:  Monitor blood glucose levels with insulin sliding scale coverage. No basal coverage for now. Monitor for complications.  
  
Body mass index is 36.96 kg/m².: 30.0 - 39.9 Obese   
   
 
Problem List: 
Problem List as of 2/23/2021 Date Reviewed: 11/2/2020 Codes Class Noted - Resolved Elevated brain natriuretic peptide (BNP) level ICD-10-CM: R79.89 ICD-9-CM: 790.99  2/23/2021 - Present Acute blood loss anemia ICD-10-CM: D62 
ICD-9-CM: 285.1  2/23/2021 - Present Dyspnea ICD-10-CM: R06.00 
ICD-9-CM: 786.09  2/23/2021 - Present GIB (gastrointestinal bleeding) ICD-10-CM: K92.2 ICD-9-CM: 578.9  2/23/2021 - Present Symptomatic anemia ICD-10-CM: D64.9 ICD-9-CM: 285.9  2/23/2021 - Present Pneumonia ICD-10-CM: J18.9 ICD-9-CM: 053  1/26/2021 - Present SOB (shortness of breath) ICD-10-CM: R06.02 
ICD-9-CM: 786.05  1/26/2021 - Present Anemia ICD-10-CM: D64.9 ICD-9-CM: 285.9  1/26/2021 - Present  Acute on chronic systolic CHF (congestive heart failure) (HCC) ICD-10-CM: X55.81 
 ICD-9-CM: 428.23, 428.0  1/26/2021 - Present Uncontrolled type 2 diabetes mellitus with hyperglycemia (HCC) ICD-10-CM: E11.65 ICD-9-CM: 250.02  1/26/2021 - Present Left lower lobe pneumonia ICD-10-CM: J18.9 ICD-9-CM: 542  1/1/2021 - Present Leukocytosis ICD-10-CM: L07.386 ICD-9-CM: 288.60  11/3/2020 - Present Chronic respiratory failure with hypoxia [facility]) ICD-10-CM: J96.11 
ICD-9-CM: 518.83, 799.02  11/2/2020 - Present Overview Signed 11/2/2020 10:12 PM by Johanna Swann MD  
  On 3L NC at home Cellulitis of lower extremity ICD-10-CM: L03.119 ICD-9-CM: 682.6  3/23/2020 - Present ASO (arteriosclerosis obliterans) ICD-10-CM: I70.90 ICD-9-CM: 440.9  9/5/2019 - Present PVD (peripheral vascular disease) ([facility] ICD-10-CM: I73.9 ICD-9-CM: 443.9  8/1/2019 - Present Severe obesity ([facility] ICD-10-CM: E66.01 
ICD-9-CM: 278.01  7/30/2019 - Present COPD (chronic obstructive pulmonary disease) ([facility]) ICD-10-CM: J44.9 ICD-9-CM: 798  7/13/2019 - Present Normocytic anemia ICD-10-CM: D64.9 ICD-9-CM: 285.9  7/13/2019 - Present PAD (peripheral artery disease) ([facility]) ICD-10-CM: I73.9 ICD-9-CM: 443.9  7/13/2019 - Present Mild intermittent asthma ICD-10-CM: J45.20 ICD-9-CM: 493.90  5/17/2019 - Present Brachial artery thrombus (HCC) ICD-10-CM: E57.9 ICD-9-CM: 444.21  4/26/2019 - Present Sleep apnea ICD-10-CM: G47.30 ICD-9-CM: 780.57  1/5/2019 - Present Overview Signed 1/5/2019  8:41 PM by Chani Fuller DO  
  wears CPAP Atrial fibrillation [facility]) ICD-10-CM: I48.91 
ICD-9-CM: 427.31  11/16/2018 - Present Obesity (BMI 30-39.9) (Chronic) ICD-10-CM: T67.8 ICD-9-CM: 278.00  11/13/2018 - Present Recurrent deep vein thrombosis (DVT) (HCC) ICD-10-CM: I82.409 ICD-9-CM: 453.40  3/30/2018 - Present Chronic anticoagulation (Chronic) ICD-10-CM: Z79.01 
ICD-9-CM: V58.61  3/30/2018 - Present Essential hypertension (Chronic) ICD-10-CM: I10 
ICD-9-CM: 401.9  1/22/2016 - Present CAD (coronary artery disease) ICD-10-CM: I25.10 ICD-9-CM: 414.00  10/9/2015 - Present Type II diabetes mellitus (HCC) (Chronic) ICD-10-CM: E11.9 ICD-9-CM: 250.00  10/9/2015 - Present RESOLVED: Syncope and collapse ICD-10-CM: R55 
ICD-9-CM: 780.2  9/29/2020 - 11/2/2020 RESOLVED: Cellulitis ICD-10-CM: L03.90 ICD-9-CM: 682.9  3/23/2020 - 5/29/2020 RESOLVED: Hypokalemia ICD-10-CM: E87.6 ICD-9-CM: 276.8  3/23/2020 - 5/29/2020 RESOLVED: Hyponatremia ICD-10-CM: E87.1 ICD-9-CM: 276.1  3/23/2020 - 5/29/2020 RESOLVED: Diarrhea ICD-10-CM: R19.7 ICD-9-CM: 787.91  3/23/2020 - 5/29/2020 RESOLVED: Syncope and collapse ICD-10-CM: R55 
ICD-9-CM: 780.2  7/13/2019 - 5/29/2020 RESOLVED: UTI (urinary tract infection) ICD-10-CM: N39.0 ICD-9-CM: 599.0  7/13/2019 - 11/2/2020 RESOLVED: Sepsis ([address] ICD-10-CM: A41.9 ICD-9-CM: 038.9, 995.91  7/13/2019 - 11/3/2020 RESOLVED: Leg wound, left ICD-10-CM: G96.689G ICD-9-CM: 891.0  7/13/2019 - 5/29/2020 RESOLVED: Leg laceration, right, initial encounter ICD-10-CM: S60.024B ICD-9-CM: 894.0  7/13/2019 - 5/29/2020 RESOLVED: Cellulitis of right upper arm ICD-10-CM: L03.113 ICD-9-CM: 682.3  5/17/2019 - 5/29/2020 RESOLVED: Gangrene of finger of right hand ([facility] ICD-10-CM: A36 
ICD-9-CM: 785.4  5/17/2019 - 11/2/2020 RESOLVED: Bowel obstruction ([facility] ICD-10-CM: K91.841 ICD-9-CM: 560.9  1/8/2019 - 5/29/2020 RESOLVED: Partial small bowel obstruction ([facility] ICD-10-CM: K56.600 ICD-9-CM: 560.9  1/5/2019 - 5/29/2020 RESOLVED: Dyspnea ICD-10-CM: R06.00 
ICD-9-CM: 786.09  11/13/2018 - 5/29/2020 RESOLVED: ARF (acute renal failure) (HCC) ICD-10-CM: N17.9 ICD-9-CM: 584.9  11/13/2018 - 5/29/2020 RESOLVED: Closed wedge compression fracture of T7 vertebra ([facility] ICD-10-CM: G48.265P ICD-9-CM: 805.2  11/13/2018 - 5/29/2020 RESOLVED: Type 2 diabetes with nephropathy ([facility] ICD-10-CM: E11.21 
ICD-9-CM: 250.40, 583.81  10/1/2018 - 11/2/2020 RESOLVED: Chest pain ICD-10-CM: R07.9 ICD-9-CM: 786.50  6/27/2018 - 5/29/2020 RESOLVED: Abdominal pain ICD-10-CM: R10.9 ICD-9-CM: 789.00  6/27/2018 - 5/29/2020 RESOLVED: Coronary artery disease involving native coronary artery without angina pectoris (Chronic) ICD-10-CM: I25.10 ICD-9-CM: 414.01  1/22/2016 - 11/2/2020 RESOLVED: HTN (hypertension) ICD-10-CM: I10 
ICD-9-CM: 401.9  10/9/2015 - 1/22/2016 RESOLVED: NSTEMI (non-ST elevated myocardial infarction) ([facility] ICD-10-CM: I21.4 ICD-9-CM: 410.70  10/9/2015 - 5/29/2020 Subjective:  
 70 y.o. female with history that includes DM 2, CAD with MI, COPD on [facility] baseline, and A. fib on Eliquis who was recently admitted to [facility] with anemia, pneumonia, and acute on chronic CHF then transferred to rehab was sent back to the rehab due to anemia and dyspnea. She has been somewhat dyspneic for the past couple of days constant and worsening to the point of being severe even at rest with increased O2 requirements up to 4 LNC associated with orthopnea. She was found to have a hemoglobin of 6.8 at the rehab center. However, here in the ER she was found to have hemoglobin at 7.2 and she was back to her 3LNC. It was reported dark stools which tested FOBT positive. She is on Eliquis as mentioned. (Dr Gale Craven) :  Reports she if feeling better this AM with less SOB and O2 back to usual 3 liters. No ab pain. No CP. Sats ok on O2. Nurses report they are unable to get AM labs and pt needs central, PICC or mid line placed. She does not appear in any distress. Dr. Braulio Fry has ordered blood and consented pt. Her chronic GI bleeding may warrant complete DC of anticoagulation - discussed with GI and little else to do. Also consulting Cards for her chronic decompensation of CHF/A fib episodes with mult readmissions - there may be little to do for this either.  
  
Review of Systems: A comprehensive review of systems was negative except for that written in the HPI. Objective:  
Physical Exam:  
 
Visit Vitals BP 93/62 Pulse (!) 120 Temp 97.9 °F (36.6 °C) Resp 20 Ht 5' 7\" (1.702 m) Wt 107 kg (236 lb) SpO2 91% BMI 36.96 kg/m² O2 Flow Rate (L/min): 4 l/min O2 Device: Nasal cannula Temp (24hrs), Av °F (36.7 °C), Min:97.9 °F (36.6 °C), Max:98 °F (36.7 °C) 
   0701 -  1900 In: -  
Out: 900 [Urine:900]   No intake/output data recorded. General:  Alert, obese,cooperative, no distress, appears stated age. Head:  Normocephalic, without obvious abnormality, atraumatic. Eyes:  Conjunctivae/corneas clear. EOMs intact. Nose: Nares normal. Septum midline. Mucosa normal. No drainage or sinus tenderness. Throat: Lips, mucosa, and tongue moist.. Neck: Supple, symmetrical, trachea midline, no adenopathy, thyroid: no enlargement/tenderness/nodules, no carotid bruit and no JVD. Lungs:   A few basilar rales bilaterally. Chest wall:  No tenderness or deformity. Heart:  Rapid, irreg Abdomen:   Soft, non-tender. Bowel sounds normal. No masses,  No organomegaly. Extremities: no cyanosis. Trace LE edema. No calf tenderness or cords. Pulses: 2+ and symmetric all extremities. Skin:  turgor normal. No rashes Neurologic:   Alert and oriented X 3. Fine motor of hands and fingers normal.   equal.  No cogwheeling or rigidity. Gait not tested at this time. Sensation grossly normal to touch. Gross motor of extremities normal.    
 
Data Review:  
   
Recent Days: 
Recent Labs  
  02/22/21 2246 WBC 15.1* HGB 7.2* HCT 25.3*  
 Recent Labs  
  02/22/21 2246   
K 3.6  CO2 34* * BUN 36* CREA 1.25* CA 7.5* ALB 2.4* TBILI 0.4 ALT 27 INR 1.1 No results for input(s): PH, PCO2, PO2, HCO3, FIO2 in the last 72 hours. 24 Hour Results: 
Recent Results (from the past 24 hour(s)) EKG, 12 LEAD, INITIAL Collection Time: 02/22/21 10:20 PM  
Result Value Ref Range Ventricular Rate 86 BPM  
 Atrial Rate 86 BPM  
 P-R Interval 178 ms QRS Duration 90 ms Q-T Interval 372 ms QTC Calculation (Bezet) 445 ms Calculated P Axis 34 degrees Calculated R Axis -28 degrees Calculated T Axis 23 degrees Diagnosis Sinus rhythm with premature atrial complexes Low voltage QRS Borderline ECG When compared with ECG of 13-JAN-2021 12:41, 
premature atrial complexes are now present Confirmed by Santana Vera ([id number]) on 2/23/2021 9:07:60 PM 
  
METABOLIC PANEL, COMPREHENSIVE Collection Time: 02/22/21 10:46 PM  
Result Value Ref Range Sodium 142 136 - 145 mmol/L Potassium 3.6 3.5 - 5.1 mmol/L Chloride 104 97 - 108 mmol/L  
 CO2 34 (H) 21 - 32 mmol/L Anion gap 4 (L) 5 - 15 mmol/L Glucose 140 (H) 65 - 100 mg/dL BUN 36 (H) 6 - 20 MG/DL Creatinine 1.25 (H) 0.55 - 1.02 MG/DL  
 BUN/Creatinine ratio 29 (H) 12 - 20 GFR est AA 51 (L) >60 ml/min/1.73m2 GFR est non-AA 42 (L) >60 ml/min/1.73m2 Calcium 7.5 (L) 8.5 - 10.1 MG/DL Bilirubin, total 0.4 0.2 - 1.0 MG/DL  
 ALT (SGPT) 27 12 - 78 U/L  
 AST (SGOT) 21 15 - 37 U/L Alk. phosphatase 188 (H) 45 - 117 U/L Protein, total 5.6 (L) 6.4 - 8.2 g/dL Albumin 2.4 (L) 3.5 - 5.0 g/dL Globulin 3.2 2.0 - 4.0 g/dL A-G Ratio 0.8 (L) 1.1 - 2.2    
CBC WITH AUTOMATED DIFF Collection Time: 02/22/21 10:46 PM  
Result Value Ref Range WBC 15.1 (H) 3.6 - 11.0 K/uL  
 RBC 2.58 (L) 3.80 - 5.20 M/uL HGB 7.2 (L) 11.5 - 16.0 g/dL HCT 25.3 (L) 35.0 - 47.0 % MCV 98.1 80.0 - 99.0 FL  
 MCH 27.9 26.0 - 34.0 PG  
 MCHC 28.5 (L) 30.0 - 36.5 g/dL  
 RDW 16.3 (H) 11.5 - 14.5 % PLATELET 785 168 - 257 K/uL MPV 10.0 8.9 - 12.9 FL  
 NRBC 0.1 (H) 0  WBC ABSOLUTE NRBC 0.02 (H) 0.00 - 0.01 K/uL NEUTROPHILS 89 (H) 32 - 75 % BAND NEUTROPHILS 1 0 - 6 % LYMPHOCYTES 5 (L) 12 - 49 % MONOCYTES 5 5 - 13 % EOSINOPHILS 0 0 - 7 % BASOPHILS 0 0 - 1 % IMMATURE GRANULOCYTES 0 %  
 ABS. NEUTROPHILS 13.5 (H) 1.8 - 8.0 K/UL  
 ABS. LYMPHOCYTES 0.8 0.8 - 3.5 K/UL  
 ABS. MONOCYTES 0.8 0.0 - 1.0 K/UL  
 ABS. EOSINOPHILS 0.0 0.0 - 0.4 K/UL  
 ABS. BASOPHILS 0.0 0.0 - 0.1 K/UL  
 ABS. IMM. GRANS. 0.0 K/UL  
 DF MANUAL    
 RBC COMMENTS ANISOCYTOSIS 1+ 
    
 RBC COMMENTS STOMATOCYTES PRESENT 
    
PTT Collection Time: 02/22/21 10:46 PM  
Result Value Ref Range aPTT 26.1 22.1 - 31.0 sec  
 aPTT, therapeutic range     58.0 - 77.0 SECS  
PROTHROMBIN TIME + INR Collection Time: 02/22/21 10:46 PM  
Result Value Ref Range INR 1.1 0.9 - 1.1 Prothrombin time 11.1 9.0 - 11.1 sec TYPE & SCREEN Collection Time: 02/22/21 10:46 PM  
Result Value Ref Range Crossmatch Expiration 02/25/2021,2359 ABO/Rh(D) A POSITIVE Antibody screen NEG Unit number [id number] Blood component type RC LR Unit division 00 Status of unit ISSUED Crossmatch result Compatible TROPONIN I Collection Time: 02/22/21 10:46 PM  
Result Value Ref Range Troponin-I, Qt. <0.05 <0.05 ng/mL NT-PRO BNP Collection Time: 02/22/21 10:46 PM  
Result Value Ref Range NT pro-BNP 1,550 (H) <125 PG/ML  
OCCULT BLOOD, STOOL Collection Time: 02/22/21 11:46 PM  
Result Value Ref Range Occult blood, stool Positive (A) NEG    
SARS-COV-2 Collection Time: 02/23/21  6:42 AM  
Result Value Ref Range SARS-CoV-2 Please find results under separate order RBC, ALLOCATE Collection Time: 02/23/21  7:45 AM  
Result Value Ref Range HISTORY CHECKED? Historical check performed GLUCOSE, POC Collection Time: 02/23/21  7:45 AM  
Result Value Ref Range Glucose (POC) 91 65 - 100 mg/dL Performed by Travon Khoury GLUCOSE, POC Collection Time: 02/23/21 11:05 AM  
Result Value Ref Range Glucose (POC) 76 65 - 100 mg/dL Performed by Andrey Scott GLUCOSE, POC Collection Time: 02/23/21  1:02 PM  
Result Value Ref Range Glucose (POC) 56 (L) 65 - 100 mg/dL Performed by Marion Buerger GLUCOSE, POC Collection Time: 02/23/21  1:21 PM  
Result Value Ref Range Glucose (POC) 95 65 - 100 mg/dL Performed by Andrey Scott Medications reviewed Current Facility-Administered Medications Medication Dose Route Frequency  pantoprazole (PROTONIX) 40 mg in 0.9% sodium chloride 10 mL injection  40 mg IntraVENous Q12H  
 sodium chloride (NS) flush 5-40 mL  5-40 mL IntraVENous Q8H  
 sodium chloride (NS) flush 5-40 mL  5-40 mL IntraVENous PRN  
 acetaminophen (TYLENOL) tablet 650 mg  650 mg Oral Q6H PRN Or  
 acetaminophen (TYLENOL) suppository 650 mg  650 mg Rectal Q6H PRN  polyethylene glycol (MIRALAX) packet 17 g  17 g Oral DAILY PRN  
 bisacodyL (DULCOLAX) suppository 10 mg  10 mg Rectal DAILY PRN  
 ondansetron (ZOFRAN) injection 4 mg  4 mg IntraVENous Q6H PRN  
 glucose chewable tablet 16 g  4 Tab Oral PRN  
 dextrose (D50W) injection syrg 12.5-25 g  25-50 mL IntraVENous PRN  
 glucagon (GLUCAGEN) injection 1 mg  1 mg IntraMUSCular PRN  
 insulin lispro (HUMALOG) injection   SubCUTAneous Q6H  
 0.9% sodium chloride infusion 250 mL  250 mL IntraVENous PRN  
 
 
 Care Plan discussed with: Patient/Family and Nurse Total time spent with patient: 30 minutes.  
 
Gal Martinez MD

## 2021-02-23 NOTE — ROUTINE PROCESS
TRANSFER - OUT REPORT: 
 
Verbal report given to Spring RN(name) on Sherry Jalloh  being transferred to remote tele (unit) for routine progression of care    
 
Report consisted of patient’s Situation, Background, Assessment and  
Recommendations(SBAR).  
 
Information from the following report(s) SBAR, ED Summary, MAR and Recent Results was reviewed with the receiving nurse. 
 
Lines:  
Peripheral IV 02/22/21 Left Antecubital (Active)  
Site Assessment Clean, dry, & intact 02/22/21 2250  
Phlebitis Assessment 0 02/22/21 2250  
Infiltration Assessment 0 02/22/21 2250  
Dressing Status Clean, dry, & intact 02/22/21 2250  
Dressing Type Tape;Transparent 02/22/21 2250  
Hub Color/Line Status Pink;Flushed 02/22/21 2250  
Action Taken Blood drawn 02/22/21 2250  
  
 
Opportunity for questions and clarification was provided.   
 
Patient transported with: 
 Tech

## 2021-02-23 NOTE — PROGRESS NOTES
TRANSFER - IN REPORT: 
 
Verbal report received from fourth floor nurse(name) on Fabian Wbeb  being received from 4 th (unit) for change in patient condition(NSR) Report consisted of patients Situation, Background, Assessment and  
Recommendations(SBAR). Information from the following report(s) SBAR, Kardex, ED Summary, Intake/Output, MAR, Recent Results and Cardiac Rhythm A fib was reviewed with the receiving nurse. Opportunity for questions and clarification was provided. Assessment completed upon patients arrival to unit and care assumed. 1815 Pt arrived to the unit. Bedside shift change report given to Ramsey Gold (oncoming nurse) by Lane Sam (offgoing nurse). Report included the following information SBAR, Kardex, ED Summary, Intake/Output, MAR, Recent Results and Cardiac Rhythm A fib.

## 2021-02-23 NOTE — H&P
[facility] [address] [facility], [facility] 19 
[phone] Hospitalist Admission Note NAME: Dev Yeager :  1945 MRN:  [id number] Date/Time:  2021 5:13 AM 
 
Patient PCP: Buck Cota MD 
 
Emergency Contact:   
Extended Emergency Contact Information Primary Emergency Contact: Kelley Kidd Address: [address], [address] [place] [address] Home Phone: [phone] Work Phone: [phone] Mobile Phone: [phone] Relation: Daughter  needed? No 
Secondary Emergency Contact: Moon Roblero Mobile Phone: [phone] Relation: Grandchild Code: FULL Isolation Precautions: Droplet Plus, Contact Subjective: CHIEF COMPLAINT: Anemia and shortness of breath HISTORY OF PRESENT ILLNESS:    
Ms. Arablela Stewart is a 70 y.o. female with history that includes DM 2, CAD with MI, COPD on 3LNC baseline, and A. fib on Eliquis who was recently admitted to [facility] with anemia, pneumonia, and acute on chronic CHF then transferred to rehab was sent back to the rehab due to anemia and dyspnea. She has been somewhat dyspneic for the past couple of days constant and worsening to the point of being severe even at rest with increased O2 requirements up to 4 LNC associated with orthopnea. She was found to have a hemoglobin of 6.8 at the rehab center. However, here in the ER she was found to have hemoglobin at 7.2 and she was back to her 3LNC. It was reported dark stools which tested FOBT positive. She is on Eliquis as mentioned. Allergies Allergen Reactions  Advair Diskus [Fluticasone Propion-Salmeterol] Rash  Aspartame Other (comments)  Breo Ellipta [Fluticasone Furoate-Vilanterol] Rash  Bumex [Bumetanide] Myalgia  Ciprofloxacin Rash  Statins-Hmg-Coa Reductase Inhibitors Other (comments) Muscle pain Lipitor/crestor/zocor  Sulfa (Sulfonamide Antibiotics) Rash  Tetracycline Other (comments) musclepain  Torsemide Rash and Myalgia  Zetia [Ezetimibe] Myalgia Prior to Admission medications Medication Sig Start Date End Date Taking? Authorizing Provider  
loperamide (IMODIUM) 2 mg capsule Take 2 Caps by mouth two (2) times daily as needed for Diarrhea. 2/7/21   Leigh Ann Castillo MD  
amiodarone (CORDARONE) 200 mg tablet Take 1 Tab by mouth daily. 2/7/21   Leigh Ann Castillo MD  
dilTIAZem ER (CARDIZEM CD) 300 mg capsule Take 1 Cap by mouth daily. 2/7/21   Leigh Ann Castillo MD  
furosemide (LASIX) 40 mg tablet Take 1 Tab by mouth daily. 2/7/21   Leigh Ann Castillo MD  
potassium chloride SR (K-TAB) 20 mEq tablet Take 1 Tab by mouth two (2) times a day. Recheck potassium regularly 2/7/21   Leigh Ann Castillo MD  
ondansetron hcl (Zofran) 4 mg tablet Take 4 mg by mouth every six (6) hours as needed for Nausea or Vomiting. Provider, Historical  
polyethylene glycol (Miralax) 17 gram/dose powder Take 17 g by mouth daily as needed for Constipation. Provider, Historical  
predniSONE (DELTASONE) 20 mg tablet Take 20 mg by mouth daily (with breakfast). 1/16/21   Leigh Ann Castillo MD  
lipase-protease-amylase (CREON 24,000) 24,000-76,000 -120,000 unit capsule Take 1 Cap by mouth three (3) times daily (with meals). 1/15/21   Leigh Ann Castillo MD  
pantoprazole (PROTONIX) 40 mg tablet Take 1 Tab by mouth Daily (before breakfast). 1/16/21   Leigh Ann Castillo MD  
phosphorus (K PHOS NEUTRAL) 250 mg tablet Take 1 Tab by mouth two (2) times a day. 1/15/21   Leigh Ann Castillo MD  
aspirin 81 mg chewable tablet Take 81 mg by mouth daily. Provider, Historical  
apixaban (ELIQUIS) 5 mg tablet Take 5 mg by mouth two (2) times a day. Provider, Historical  
alendronate (FOSAMAX) 70 mg tablet Take 70 mg by mouth every seven (7) days.  On Sundays    Provider, Historical  
 zinc oxide 20 % ointment Apply  to affected area three (3) times daily. Bilateral gluteal and perianal area each shift    Provider, Historical  
honey (MediHoney, honey,) 100 % topical paste Apply  to affected area daily. Provider, Historical  
cyanocobalamin 1,000 mcg tablet Take 1 Tab by mouth daily. 10/13/20   Ralph Kelsey MD  
ferrous sulfate 325 mg (65 mg iron) tablet Take 1 Tab by mouth daily (with breakfast). 10/13/20   Ralph Kelsey MD  
tiotropium bromide (Spiriva Respimat) 2.5 mcg/actuation inhaler Take 2 Puffs by inhalation daily. Provider, Historical  
montelukast (Singulair) 10 mg tablet Take 10 mg by mouth every evening. Provider, Historical  
sAXagliptin (ONGLYZA) 5 mg tab tablet Take 5 mg by mouth Daily (before dinner). Provider, Historical  
acetaminophen (TYLENOL) 325 mg tablet Take 1 Tab by mouth every four (4) hours as needed for Pain. 3/31/20   Ralph Kelsey MD  
magnesium oxide (MAG-OX) 400 mg tablet Take 400 mg by mouth nightly. Provider, Historical  
mirtazapine (REMERON) 15 mg tablet Take 15 mg by mouth nightly. Provider, Historical  
albuterol (PROVENTIL VENTOLIN) 2.5 mg /3 mL (0.083 %) nebulizer solution 2.5 mg by Nebulization route every six (6) hours as needed for Wheezing or Shortness of Breath. Provider, Historical  
albuterol (PROAIR HFA) 90 mcg/actuation inhaler Take 2 Puffs by inhalation every four (4) hours as needed. Provider, Historical  
lactobacillus rhamnosus gg 10 billion cell (CULTURELLE) 10 billion cell capsule Take 1 Cap by mouth daily. Provider, Historical  
biotin 10,000 mcg cap Take 1 Cap by mouth daily. Provider, Historical  
DULoxetine (CYMBALTA) 60 mg capsule Take 60 mg by mouth daily. Provider, Historical  
cholecalciferol, vitamin D3, (Vitamin D3) 50 mcg (2,000 unit) tab Take 2,000 Units by mouth daily.     Provider, Historical  
 mometasone-formoterol (DULERA) 200-5 mcg/actuation HFA inhaler Take 2 Puffs by inhalation two (2) times a day. Provider, Historical  
 
 
Past Medical History:  
Diagnosis Date  Asthma  Atrial fibrillation ([facility] 11/16/2018  Autoimmune disease ([facility]   
 fibromyalgia  CAD (coronary artery disease) 10/9/2015  Closed wedge compression fracture of T7 vertebra ([facility] 11/13/2018  COPD (chronic obstructive pulmonary disease) ([facility])  Diabetes ([facility]  DVT (deep vein thrombosis) in pregnancy  GERD (gastroesophageal reflux disease)  Heart failure ([facility]  History of vascular access device 09/30/2020  
 4f midline, 12cm at 1cm out placed in L Cephalic by Deisi Walker RN  
 HTN (hypertension)  NSTEMI (non-ST elevated myocardial infarction) ([facility] 10/9/2015  Obesity (BMI 30-39.9) 11/13/2018  PAD (peripheral artery disease) ([facility] prior stenting  Sleep apnea   
 wears CPAP  
 Statin intolerance Past Surgical History:  
Procedure Laterality Date  COLONOSCOPY N/A 1/4/2021 COLONOSCOPY performed by Ana Rizzo MD at [address]  HX COLOSTOMY    
 and reversal, post episiotomy repair [address]  HX UROLOGICAL  2009  
 bladder sling  IR KYPHOPLASTY LUMBAR  10/8/2020  GA ABDOMEN SURGERY PROC UNLISTED    
 hital hernia repair, gall bladder removed  GA AMPUTATION TRANSMETACARPAL Right 06/12/2019  
 entire ring finger, 2nd & 3rd fingers (transmetacarpal)  GA BREAST SURGERY PROCEDURE UNLISTED    
 lumpectomy  GA CARDIAC SURG PROCEDURE UNLIST  10/9/15  
 2 stents Social History Tobacco Use  Smoking status: Former Smoker Quit date: 3/30/2017 Years since quitting: 3.9  Smokeless tobacco: Never Used Substance Use Topics  Alcohol use: No  
  Alcohol/week: 0.0 standard drinks Comment: very very rarely Family History Problem Relation Age of Onset  Diabetes Mother  Heart Failure Mother  Kidney Disease Mother  Diabetes Father  Heart Disease Father  Elevated Lipids Father  Heart Failure Father  Heart Disease Brother  Cancer Brother   
     kidney  Diabetes Brother  No Known Problems Brother  No Known Problems Brother [address] and medications were reviewed. Review of Systems (14 point ROS): 
(bold if positive, if negative) Gen:   , , , , fatigue, malaiseEyes:  , , ENT:   , , , , CVS:   , , , , , , EDWARDS, orthopneaPulm: , dyspnea, , , GI:       , , , , , , melena, :     , , , , MS:     , weakness, , Skin:   , , , , Psy:    , , , , , , Endo: , , , Hem:  , , , Cordell:   , , , , Jake:   , , , , , , ,   
 
  
Objective:   
 
Visit Vitals BP (!) 109/56 Pulse (!) 110 Temp 98 °F (36.7 °C) Resp 15 Ht 5' 7\" (1.702 m) Wt 107 kg (236 lb) SpO2 99% BMI 36.96 kg/m² Exam:  
 
Physical Exam: 
 
General:  Alert, cooperative, mild respiratory distress Head: Normocephalic, atraumatic Eyes: PERRL and EOMI sclera clear ENT: Lips, mucosa, and tongue normal.  
Neck: supple, no tenderness Lungs: Bibasilar crackles and Heart: S1-S2, IRR-IRR Tachy Abd: SNTBS(+) Ext: no cyanosis, bilateral 3+ LE pitting edema Pulses: 2+ and symmetric Skin: Skin color, texture, turgor normal. No rashes or lesions Neuro: Alert and oriented x 4 Psych: Not anxious, cooperative, normal affect Labs: 
 
Recent Labs  
  02/22/21 
2246 WBC 15.1* HGB 7.2* HCT 25.3*  
 Recent Labs  
  02/22/21 
2246   
K 3.6  CO2 34* * BUN 36* CREA 1.25* CA 7.5* ALB 2.4* TBILI 0.4 ALT 27 Lab Results Component Value Date/Time Glucose (POC) 365 (H) 02/09/2021 04:33 PM  
 Glucose (POC) 182 (H) 02/09/2021 11:48 AM  
 
No results for input(s): PH, PCO2, PO2, HCO3, FIO2 in the last 72 hours. Recent Labs  
  02/22/21 
2246 INR 1.1 Radiology and EKG reviewed:    
MARLA TAPIA [facility] Result Date: 2/22/2021 Of cardiomegaly with mild pulmonary edema, bibasilar atelectasis, and small bilateral pleural effusions. I personally reviewed and interpreted the imaging studies and agree with official reading. **Chart reviewed in [facility]** Assessment/Plan:   
 
Acute blood loss anemia due to GIB / Symptomatic anemia:  Admit for further workup and treatment. NPO.  IVF. PPI. Supportive care. Type & Screen and transfuse 1 unit PRBCs, serial H&H, PT.INR.  GI consult. Mechanical DVT prophylaxis only due to bleeding Acute on chronic systolic CHF /  Elevated BNP:  Will admit for further workup and treatment of CHF due to high risk of cardiorespiratory decompensation. lasix IV  Nitrate if BP allows. MED REC for CHF meds  Labs including troponin BNP chem panel. RepeatElevate HOB, I&Os, daily weigh. Cardiology consult. Dyspnea: Appears to be multifactorial related to her underlying COPD which at this point seems to be stable but will need to treat the CHF in the symptomatic anemia as discussed above. COPD: Appears to be stable and at baseline with no exacerbation. Continue with baseline oxygen supplementation and increase as needed. Scheduled and as needed bronchodilators. Atrial fibrillation ([address] (11/16/2018) on Eliquis: Chronic but rate controlled. Given the GI bleed and anemia weigh risks and benefits of continuing Eliquis. DM type 2:  Monitor blood glucose levels with insulin sliding scale coverage. No basal coverage for now. Monitor for complications. Body mass index is 36.96 kg/m².: 30.0 - 39.9 Obese Risk of deterioration: high Total time spent with patient: 79 Minutes **I personally saw and examined the patient during this time period** Discussed:  Code Status and Care Plan discussed with:  [x]Patient  []Family  []Care Giver  [x]ED Doc  [x]RN  []Specialist  []Care Manager Prophylaxis:  SCD's and H2B/PPI Probable Disposition:  SAH/Rehab Patient was seen:   2/23/2021 Patient was admitted overnight as a Tuck-In for [facility]. Patient was signed out to  Dr. Pinky Stuart on 2/23/2021 at 7:15 AM. 
 
 
        
___________________________________________________ Admitting Physician: Josefa Lund MD

## 2021-02-23 NOTE — CONSULTS
Gastroenterology Consult Referring Physician:SATINDER Bateman Consult Date: 2/23/2021 Subjective: Chief Complaint: difficulty breathing History of Present Illness: Audrey Ruiz is a 70 y.o. female who is seen in consultation for anemia. Known as having chronic GI complaints that were finally satisfactorily treated when here last.  Since then she has not had abdominal pain, vomiting, diarrhea. In January had a ferritin level of 69. Has history of low B12 Has a recent EGD colonoscopy here that was without diagnosis. Currently admit for difficulty breathing and noted have anemia Past Medical History:  
Diagnosis Date  Asthma  Atrial fibrillation ([address] 11/16/2018  Autoimmune disease ([address]   
 fibromyalgia  CAD (coronary artery disease) 10/9/2015  Closed wedge compression fracture of T7 vertebra ([address] 11/13/2018  COPD (chronic obstructive pulmonary disease) ([facility])  Diabetes ([facility]  DVT (deep vein thrombosis) in pregnancy  GERD (gastroesophageal reflux disease)  Heart failure ([address]  History of vascular access device 09/30/2020  
 4f midline, 12cm at 1cm out placed in L Cephalic by Kehinde James RN  
 HTN (hypertension)  NSTEMI (non-ST elevated myocardial infarction) ([facility] 10/9/2015  Obesity (BMI 30-39.9) 11/13/2018  PAD (peripheral artery disease) ([facility] prior stenting  Sleep apnea   
 wears CPAP  
 Statin intolerance Past Surgical History:  
Procedure Laterality Date  COLONOSCOPY N/A 1/4/2021 [facility]COPY performed [facility] at [facility]  HX COLOSTOMY    
 and reversal, post episiotomy repair [address]  HX UROLOGICAL  2009  
 bladder sling  IR KYPHOPLASTY LUMBAR  10/8/2020  CT ABDOMEN SURGERY PROC UNLISTED    
 hital hernia repair, gall bladder removed  CT AMPUTATION TRANSMETACARPAL Right 06/12/2019 entire ring finger, 2nd & 3rd fingers (transmetacarpal)  ID BREAST SURGERY PROCEDURE UNLISTED    
 lumpectomy  ID CARDIAC SURG PROCEDURE UNLIST  10/9/15  
 2 stents Family History Problem Relation Age of Onset  Diabetes Mother  Heart Failure Mother  Kidney Disease Mother  Diabetes Father  Heart Disease Father  Elevated Lipids Father  Heart Failure Father  Heart Disease Brother  Cancer Brother   
     kidney  Diabetes Brother  No Known Problems Brother  No Known Problems Brother Social History Tobacco Use  Smoking status: Former Smoker Quit date: 3/30/2017 Years since quitting: 3.9  Smokeless tobacco: Never Used Substance Use Topics  Alcohol use: No  
  Alcohol/week: 0.0 standard drinks Comment: very very rarely Allergies Allergen Reactions  Advair Diskus [Fluticasone Propion-Salmeterol] Rash  Aspartame Other (comments)  Breo Ellipta [Fluticasone Furoate-Vilanterol] Rash  Bumex [Bumetanide] Myalgia  Ciprofloxacin Rash  Statins-Hmg-Coa Reductase Inhibitors Other (comments) Muscle pain Lipitor/crestor/zocor  Sulfa (Sulfonamide Antibiotics) Rash  Tetracycline Other (comments) musclepain  Torsemide Rash and Myalgia  Zetia [Ezetimibe] Myalgia Current Facility-Administered Medications Medication Dose Route Frequency  pantoprazole (PROTONIX) 40 mg in 0.9% sodium chloride 10 mL injection  40 mg IntraVENous Q12H  
 sodium chloride (NS) flush 5-40 mL  5-40 mL IntraVENous Q8H  
 sodium chloride (NS) flush 5-40 mL  5-40 mL IntraVENous PRN  
 acetaminophen (TYLENOL) tablet 650 mg  650 mg Oral Q6H PRN Or  
 acetaminophen (TYLENOL) suppository 650 mg  650 mg Rectal Q6H PRN  polyethylene glycol (MIRALAX) packet 17 g  17 g Oral DAILY PRN  
 bisacodyL (DULCOLAX) suppository 10 mg  10 mg Rectal DAILY PRN  
 ondansetron (ZOFRAN) injection 4 mg  4 mg IntraVENous Q6H PRN  
  glucose chewable tablet 16 g  4 Tab Oral PRN  
 dextrose (D50W) injection syrg 12.5-25 g  25-50 mL IntraVENous PRN  
 glucagon (GLUCAGEN) injection 1 mg  1 mg IntraMUSCular PRN  
 insulin lispro (HUMALOG) injection   SubCUTAneous Q6H  
 0.9% sodium chloride infusion 250 mL  250 mL IntraVENous PRN  
 amiodarone (CORDARONE) 150 mg in dextrose 5% 100 mL bolus infusion  150 mg IntraVENous ONCE  
 amiodarone (CORDARONE) 375 mg in dextrose 5% 250 mL infusion  0.5-1 mg/min IntraVENous TITRATE  metoprolol (LOPRESSOR) injection 2.5 mg  2.5 mg IntraVENous ONCE Review of Systems: A detailed 10 organ review of systems is obtained with pertinent positives as listed in the History of Present Illness and Past Medical History. All others are negative. Objective:  
 
Physical Exam: 
Visit Vitals BP (!) (P) 94/53 Pulse (!) (P) 122 Temp (P) 97.9 °F (36.6 °C) Resp (P) 20 Ht 5' 7\" (1.702 m) Wt 107 kg (236 lb) SpO2 (P) 95% BMI 36.96 kg/m² Skin:  Extremities and face reveal no rashes. No gonzalez erythema. No telangiectasias on the chest wall. HEENT: Sclerae anicteric. Extra-occular muscles are intact. No oral ulcers. No abnormal pigmentation of the lips. The neck is supple. Cardiovascular: Irregular rate and rhythm. No murmurs, gallops, or rubs. Respiratory:  Comfortable breathing with no accessory muscle use. Clear breath sounds with no wheezes, rales, or rhonchi. GI:  Abdomen nondistended, soft, and nontender. Normal active bowel sounds. No enlargement of the liver or spleen. No masses palpable. Musculoskeletal:  Diffuse pitting edema of the lower legs. Neurological:  Gross memory appears intact. Patient is alert and oriented. Psychiatric:  Mood appears appropriate with judgement intact. Lymphatic:  No cervical or supraclavicular adenopathy. Lab/Data Review: 
CMP:  
Lab Results Component Value Date/Time   02/22/2021 10:46 PM  
 K 3.6 02/22/2021 10:46 PM  
  02/22/2021 10:46 PM  
 CO2 34 (H) 02/22/2021 10:46 PM  
 AGAP 4 (L) 02/22/2021 10:46 PM  
  (H) 02/22/2021 10:46 PM  
 BUN 36 (H) 02/22/2021 10:46 PM  
 CREA 1.25 (H) 02/22/2021 10:46 PM  
 GFRAA 51 (L) 02/22/2021 10:46 PM  
 GFRNA 42 (L) 02/22/2021 10:46 PM  
 CA 7.5 (L) 02/22/2021 10:46 PM  
 ALB 2.4 (L) 02/22/2021 10:46 PM  
 TP 5.6 (L) 02/22/2021 10:46 PM  
 GLOB 3.2 02/22/2021 10:46 PM  
 AGRAT 0.8 (L) 02/22/2021 10:46 PM  
 ALT 27 02/22/2021 10:46 PM  
 
CBC:  
Lab Results Component Value Date/Time WBC 15.1 (H) 02/22/2021 10:46 PM  
 HGB 7.2 (L) 02/22/2021 10:46 PM  
 HCT 25.3 (L) 02/22/2021 10:46 PM  
  02/22/2021 10:46 PM  
 
 
 
Assessment/Plan: Active Problems: 
  Type II diabetes mellitus ([facility] (10/9/2015) Essential hypertension (1/22/2016) Atrial fibrillation ([facility] (11/16/2018) COPD (chronic obstructive pulmonary disease) ([facility] (7/13/2019) Acute on chronic systolic CHF (congestive heart failure) ([facility] (1/26/2021) Elevated brain natriuretic peptide (BNP) level (2/23/2021) Acute blood loss anemia (2/23/2021) Dyspnea (2/23/2021) GIB (gastrointestinal bleeding) (2/23/2021) Symptomatic anemia (2/23/2021) While she is persistently anemic she denies any visible blood loss and ferritin level in January was normal 
 
Recommend B 12 level Retic count No change to medication recommendations May resume anticoagulation from Gi perspective

## 2021-02-24 NOTE — CONSULTS
Palliative medicine Consult received, chart reviewed- pt sleeping and politely asked me if I could come back. Will see pt tmrw as do not see urgent palliative needs at this time.

## 2021-02-24 NOTE — PROGRESS NOTES
Gastrointestinal Progress Note 2/24/2021 Admit Date: 2/22/2021 Subjective:  
 
Feels miserable and is upset about the need for substantial additional cardiac intervention necessary. In the past she has had negative EGD, colonoscopy and evaluation for her blood loss anemia. In the past she had declined small bowel capsule evaluation. I have advised her that plans cardiac intervention would eliminate future option of capsule small bowel evaluation ; the  of the device states it to be a black box situation to have M2A capsule done in the presence of a pacemaker. Consequently policy of this hospital is not to do such. Current Facility-Administered Medications Medication Dose Route Frequency  metoprolol tartrate (LOPRESSOR) tablet 25 mg  25 mg Oral Q6H  
 pantoprazole (PROTONIX) 40 mg in 0.9% sodium chloride 10 mL injection  40 mg IntraVENous Q12H  
 sodium chloride (NS) flush 5-40 mL  5-40 mL IntraVENous Q8H  
 sodium chloride (NS) flush 5-40 mL  5-40 mL IntraVENous PRN  
 acetaminophen (TYLENOL) tablet 650 mg  650 mg Oral Q6H PRN Or  
 acetaminophen (TYLENOL) suppository 650 mg  650 mg Rectal Q6H PRN  polyethylene glycol (MIRALAX) packet 17 g  17 g Oral DAILY PRN  
 bisacodyL (DULCOLAX) suppository 10 mg  10 mg Rectal DAILY PRN  
 ondansetron (ZOFRAN) injection 4 mg  4 mg IntraVENous Q6H PRN  
 glucose chewable tablet 16 g  4 Tab Oral PRN  
 dextrose (D50W) injection syrg 12.5-25 g  25-50 mL IntraVENous PRN  
 glucagon (GLUCAGEN) injection 1 mg  1 mg IntraMUSCular PRN  
 insulin lispro (HUMALOG) injection   SubCUTAneous Q6H  
 0.9% sodium chloride infusion 250 mL  250 mL IntraVENous PRN  
 amiodarone (CORDARONE) 375 mg in dextrose 5% 250 mL infusion  0.5-1 mg/min IntraVENous TITRATE  apixaban (ELIQUIS) tablet 5 mg  5 mg Oral BID Objective: Blood pressure (!) 112/58, pulse (!) 106, temperature 99 °F (37.2 °C), resp. rate 24, height 5' 7\" (1.702 m), weight 106 kg (233 lb 11.2 oz), SpO2 98 %. 
 
02/24 0701 - 02/24 1900 In: 536 [P.O.:100; I.V.:363] Out: 150 [Urine:150] 02/22 1901 - 02/24 0700 In: 641.3 [I.V.:250] Out: 1600 [Urine:1600] EXAM:  GENERAL: alert, cooperative, no distress, HEART: irregularly irregular rhythm, LUNGS: mild expiratory wheezing heard lung bases, Heart exam - S1, S2 normal, no murmur, no gallop, rate regular, ABDOMEN:  Bowel sounds are normal, liver is not enlarged, spleen is not enlarged and EXTREMITY: edema diffuse edema Data Review Recent Results (from the past 24 hour(s)) CBC WITH AUTOMATED DIFF Collection Time: 02/23/21  9:19 PM  
Result Value Ref Range WBC 14.8 (H) 3.6 - 11.0 K/uL  
 RBC 2.84 (L) 3.80 - 5.20 M/uL HGB 8.3 (L) 11.5 - 16.0 g/dL HCT 27.3 (L) 35.0 - 47.0 % MCV 96.1 80.0 - 99.0 FL  
 MCH 29.2 26.0 - 34.0 PG  
 MCHC 30.4 30.0 - 36.5 g/dL  
 RDW 15.9 (H) 11.5 - 14.5 % PLATELET 902 030 - 341 K/uL MPV 10.0 8.9 - 12.9 FL  
 NRBC 0.3 (H) 0  WBC ABSOLUTE NRBC 0.04 (H) 0.00 - 0.01 K/uL NEUTROPHILS 83 (H) 32 - 75 % LYMPHOCYTES 7 (L) 12 - 49 % MONOCYTES 5 5 - 13 % EOSINOPHILS 3 0 - 7 % BASOPHILS 1 0 - 1 % IMMATURE GRANULOCYTES 1 (H) 0.0 - 0.5 % ABS. NEUTROPHILS 12.3 (H) 1.8 - 8.0 K/UL  
 ABS. LYMPHOCYTES 1.0 0.8 - 3.5 K/UL  
 ABS. MONOCYTES 0.8 0.0 - 1.0 K/UL  
 ABS. EOSINOPHILS 0.4 0.0 - 0.4 K/UL  
 ABS. BASOPHILS 0.1 0.0 - 0.1 K/UL  
 ABS. IMM. GRANS. 0.2 (H) 0.00 - 0.04 K/UL  
 DF AUTOMATED METABOLIC PANEL, COMPREHENSIVE Collection Time: 02/23/21  9:19 PM  
Result Value Ref Range Sodium 140 136 - 145 mmol/L Potassium 3.2 (L) 3.5 - 5.1 mmol/L Chloride 101 97 - 108 mmol/L  
 CO2 34 (H) 21 - 32 mmol/L Anion gap 5 5 - 15 mmol/L Glucose 122 (H) 65 - 100 mg/dL BUN 33 (H) 6 - 20 MG/DL  Creatinine 1.08 (H) 0.55 - 1.02 MG/DL  
 BUN/Creatinine ratio 31 (H) 12 - 20 GFR est AA >60 >60 ml/min/1.73m2 GFR est non-AA 50 (L) >60 ml/min/1.73m2 Calcium 7.3 (L) 8.5 - 10.1 MG/DL Bilirubin, total 1.2 (H) 0.2 - 1.0 MG/DL  
 ALT (SGPT) 25 12 - 78 U/L  
 AST (SGOT) 22 15 - 37 U/L Alk. phosphatase 190 (H) 45 - 117 U/L Protein, total 4.7 (L) 6.4 - 8.2 g/dL Albumin 2.2 (L) 3.5 - 5.0 g/dL Globulin 2.5 2.0 - 4.0 g/dL A-G Ratio 0.9 (L) 1.1 - 2.2 MAGNESIUM Collection Time: 02/23/21  9:19 PM  
Result Value Ref Range Magnesium 2.2 1.6 - 2.4 mg/dL GLUCOSE, POC Collection Time: 02/24/21 12:50 AM  
Result Value Ref Range Glucose (POC) 131 (H) 65 - 100 mg/dL Performed by Ishmael Escobedo CBC WITH AUTOMATED DIFF Collection Time: 02/24/21  5:52 AM  
Result Value Ref Range WBC 15.7 (H) 3.6 - 11.0 K/uL  
 RBC 2.94 (L) 3.80 - 5.20 M/uL HGB 8.4 (L) 11.5 - 16.0 g/dL HCT 28.5 (L) 35.0 - 47.0 % MCV 96.9 80.0 - 99.0 FL  
 MCH 28.6 26.0 - 34.0 PG  
 MCHC 29.5 (L) 30.0 - 36.5 g/dL  
 RDW 16.0 (H) 11.5 - 14.5 % PLATELET 827 718 - 011 K/uL MPV 9.8 8.9 - 12.9 FL  
 NRBC 0.4 (H) 0  WBC ABSOLUTE NRBC 0.06 (H) 0.00 - 0.01 K/uL NEUTROPHILS 80 (H) 32 - 75 % LYMPHOCYTES 7 (L) 12 - 49 % MONOCYTES 7 5 - 13 % EOSINOPHILS 3 0 - 7 % BASOPHILS 1 0 - 1 % IMMATURE GRANULOCYTES 2 (H) 0.0 - 0.5 % ABS. NEUTROPHILS 12.6 (H) 1.8 - 8.0 K/UL  
 ABS. LYMPHOCYTES 1.2 0.8 - 3.5 K/UL  
 ABS. MONOCYTES 1.0 0.0 - 1.0 K/UL  
 ABS. EOSINOPHILS 0.5 (H) 0.0 - 0.4 K/UL  
 ABS. BASOPHILS 0.1 0.0 - 0.1 K/UL  
 ABS. IMM. GRANS. 0.3 (H) 0.00 - 0.04 K/UL  
 DF AUTOMATED METABOLIC PANEL, COMPREHENSIVE Collection Time: 02/24/21  5:52 AM  
Result Value Ref Range Sodium 140 136 - 145 mmol/L Potassium 3.4 (L) 3.5 - 5.1 mmol/L Chloride 101 97 - 108 mmol/L  
 CO2 33 (H) 21 - 32 mmol/L Anion gap 6 5 - 15 mmol/L Glucose 129 (H) 65 - 100 mg/dL  BUN 30 (H) 6 - 20 MG/DL  
 Creatinine 1.12 (H) 0.55 - 1.02 MG/DL  
 BUN/Creatinine ratio 27 (H) 12 - 20 GFR est AA 58 (L) >60 ml/min/1.73m2 GFR est non-AA 48 (L) >60 ml/min/1.73m2 Calcium 7.2 (L) 8.5 - 10.1 MG/DL Bilirubin, total 1.0 0.2 - 1.0 MG/DL  
 ALT (SGPT) 26 12 - 78 U/L  
 AST (SGOT) 20 15 - 37 U/L Alk. phosphatase 195 (H) 45 - 117 U/L Protein, total 4.8 (L) 6.4 - 8.2 g/dL Albumin 2.3 (L) 3.5 - 5.0 g/dL Globulin 2.5 2.0 - 4.0 g/dL A-G Ratio 0.9 (L) 1.1 - 2.2 VITAMIN B12 Collection Time: 02/24/21  5:52 AM  
Result Value Ref Range Vitamin B12 597 193 - 986 pg/mL RETICULOCYTE COUNT Collection Time: 02/24/21  5:52 AM  
Result Value Ref Range Reticulocyte count 5.4 (H) 0.7 - 2.1 % Absolute Retic Cnt. 0.1568 (H) 0.0164 - 0.0776 M/ul SAMPLES BEING HELD Collection Time: 02/24/21  5:52 AM  
Result Value Ref Range SAMPLES BEING HELD 1LAV   
 COMMENT Add-on orders for these samples will be processed based on acceptable specimen integrity and analyte stability, which may vary by analyte. GLUCOSE, POC Collection Time: 02/24/21  5:58 AM  
Result Value Ref Range Glucose (POC) 146 (H) 65 - 100 mg/dL Performed by Chelle Og (PCT) GLUCOSE, POC Collection Time: 02/24/21 12:01 PM  
Result Value Ref Range Glucose (POC) 70 65 - 100 mg/dL Performed by Digna Baptise GLUCOSE, POC Collection Time: 02/24/21  6:00 PM  
Result Value Ref Range Glucose (POC) 114 (H) 65 - 100 mg/dL Performed by Digna Baptise Stool stool positive for occult blood. Assessment:  
 
Active Problems: 
  Type II diabetes mellitus ([facility] (10/9/2015) Essential hypertension (1/22/2016) Atrial fibrillation ([address] (11/16/2018) COPD (chronic obstructive pulmonary disease) ([facility] (7/13/2019) Acute on chronic systolic CHF (congestive heart failure) ([facility] (1/26/2021) Elevated brain natriuretic peptide (BNP) level (2/23/2021) Acute blood loss anemia (2/23/2021) Dyspnea (2/23/2021) GIB (gastrointestinal bleeding) (2/23/2021) Symptomatic anemia (2/23/2021) Plan: 1. Elevated reticulocyte count and stool for occult blood imply at least some role for intestinal blood loss and her anemia. I believe that we can resume anticoagulation, continue to monitor, discontinue anticoagulation if there is hyper visible blood loss for falling hemoglobin. This opinion is based on measured risk, impairment of any quality of life she might have left should she sustain additional disability through significant CVA. 2.  I have discussed these factors with her, twice asked again as to whether she would consider small bowel capsule study prior to placement of pacemaker; she once again emphatically states she will not consider a small bowel capsule study. 3.  Monitor hemoglobin, visible blood loss; no other intestinal examination planned at this point in time.

## 2021-02-24 NOTE — PROGRESS NOTES
Daily Progress Note: 2/24/2021 Luigi Zambrano MD 
 
Assessment/Plan:  
Acute blood loss anemia due to GIB / Symptomatic anemia:  Admit for further workup and treatment. NPO.  IVF. PPI. Supportive care. Type & Screen and transfuse 1 unit PRBCs, serial H&H, PT.INR.  GI consulted. Mechanical DVT prophylaxis only due to bleeding 
  
Acute on chronic systolic CHF /  Elevated BNP:   
-  Cardiology consulted.  
  
Dyspnea: currently improved to baseline 
  
COPD: Appears to be stable and at baseline with no exacerbation. Continue with baseline oxygen supplementation and increase as needed. Scheduled and as needed bronchodilators. 
  
Atrial fibrillation ([facility] [address] (11/16/2018) on Eliquis: Chronic but rate controlled. Given the GI bleed and anemia weigh risks and benefits of continuing Eliquis - for now stop. 
  
DM type 2:  Monitor blood glucose levels with insulin sliding scale coverage. No basal coverage for now. Monitor for complications.  
  
Body mass index is 36.96 kg/m².: 30.0 - 39.9 Obese   
   
 
Problem List: 
Problem List as of 2/24/2021 Date Reviewed: 11/2/2020 Codes Class Noted - Resolved Elevated brain natriuretic peptide (BNP) level ICD-10-CM: R79.89 ICD-9-CM: 790.99  2/23/2021 - Present Acute blood loss anemia ICD-10-CM: D62 
ICD-9-CM: 285.1  2/23/2021 - Present Dyspnea ICD-10-CM: R06.00 
ICD-9-CM: 786.09  2/23/2021 - Present GIB (gastrointestinal bleeding) ICD-10-CM: K92.2 ICD-9-CM: 578.9  2/23/2021 - Present Symptomatic anemia ICD-10-CM: D64.9 ICD-9-CM: 285.9  2/23/2021 - Present Pneumonia ICD-10-CM: J18.9 ICD-9-CM: 235  1/26/2021 - Present SOB (shortness of breath) ICD-10-CM: R06.02 
ICD-9-CM: 786.05  1/26/2021 - Present Anemia ICD-10-CM: D64.9 ICD-9-CM: 285.9  1/26/2021 - Present  Acute on chronic systolic CHF (congestive heart failure) (HCC) ICD-10-CM: A66.85 
 ICD-9-CM: 428.23, 428.0  1/26/2021 - Present Uncontrolled type 2 diabetes mellitus with hyperglycemia (HCC) ICD-10-CM: E11.65 ICD-9-CM: 250.02  1/26/2021 - Present Left lower lobe pneumonia ICD-10-CM: J18.9 ICD-9-CM: 071  1/1/2021 - Present Leukocytosis ICD-10-CM: B38.702 ICD-9-CM: 288.60  11/3/2020 - Present Chronic respiratory failure with hypoxia [facility]) ICD-10-CM: J96.11 
ICD-9-CM: 518.83, 799.02  11/2/2020 - Present Overview Signed 11/2/2020 10:12 PM by Amy Fernández MD  
  On 3L NC at home Cellulitis of lower extremity ICD-10-CM: L03.119 ICD-9-CM: 682.6  3/23/2020 - Present ASO (arteriosclerosis obliterans) ICD-10-CM: I70.90 ICD-9-CM: 440.9  9/5/2019 - Present PVD (peripheral vascular disease) ([facility] ICD-10-CM: I73.9 ICD-9-CM: 443.9  8/1/2019 - Present Severe obesity ([facility] ICD-10-CM: E66.01 
ICD-9-CM: 278.01  7/30/2019 - Present COPD (chronic obstructive pulmonary disease) ([facility]) ICD-10-CM: J44.9 ICD-9-CM: 480  7/13/2019 - Present Normocytic anemia ICD-10-CM: D64.9 ICD-9-CM: 285.9  7/13/2019 - Present PAD (peripheral artery disease) ([facility]) ICD-10-CM: I73.9 ICD-9-CM: 443.9  7/13/2019 - Present Mild intermittent asthma ICD-10-CM: J45.20 ICD-9-CM: 493.90  5/17/2019 - Present Brachial artery thrombus (HCC) ICD-10-CM: M26.4 ICD-9-CM: 444.21  4/26/2019 - Present Sleep apnea ICD-10-CM: G47.30 ICD-9-CM: 780.57  1/5/2019 - Present Overview Signed 1/5/2019  8:41 PM by DO jose raul Huerta CPAP Atrial fibrillation [facility]) ICD-10-CM: I48.91 
ICD-9-CM: 427.31  11/16/2018 - Present Obesity (BMI 30-39.9) (Chronic) ICD-10-CM: C96.2 ICD-9-CM: 278.00  11/13/2018 - Present Recurrent deep vein thrombosis (DVT) (HCC) ICD-10-CM: I82.409 ICD-9-CM: 453.40  3/30/2018 - Present Chronic anticoagulation (Chronic) ICD-10-CM: Z79.01 
ICD-9-CM: V58.61  3/30/2018 - Present Essential hypertension (Chronic) ICD-10-CM: I10 
ICD-9-CM: 401.9  1/22/2016 - Present CAD (coronary artery disease) ICD-10-CM: I25.10 ICD-9-CM: 414.00  10/9/2015 - Present Type II diabetes mellitus (HCC) (Chronic) ICD-10-CM: E11.9 ICD-9-CM: 250.00  10/9/2015 - Present RESOLVED: Syncope and collapse ICD-10-CM: R55 
ICD-9-CM: 780.2  9/29/2020 - 11/2/2020 RESOLVED: Cellulitis ICD-10-CM: L03.90 ICD-9-CM: 682.9  3/23/2020 - 5/29/2020 RESOLVED: Hypokalemia ICD-10-CM: E87.6 ICD-9-CM: 276.8  3/23/2020 - 5/29/2020 RESOLVED: Hyponatremia ICD-10-CM: E87.1 ICD-9-CM: 276.1  3/23/2020 - 5/29/2020 RESOLVED: Diarrhea ICD-10-CM: R19.7 ICD-9-CM: 787.91  3/23/2020 - 5/29/2020 RESOLVED: Syncope and collapse ICD-10-CM: R55 
ICD-9-CM: 780.2  7/13/2019 - 5/29/2020 RESOLVED: UTI (urinary tract infection) ICD-10-CM: N39.0 ICD-9-CM: 599.0  7/13/2019 - 11/2/2020 RESOLVED: Sepsis ([address] ICD-10-CM: A41.9 ICD-9-CM: 038.9, 995.91  7/13/2019 - 11/3/2020 RESOLVED: Leg wound, left ICD-10-CM: O31.544P ICD-9-CM: 891.0  7/13/2019 - 5/29/2020 RESOLVED: Leg laceration, right, initial encounter ICD-10-CM: D92.525D ICD-9-CM: 894.0  7/13/2019 - 5/29/2020 RESOLVED: Cellulitis of right upper arm ICD-10-CM: L03.113 ICD-9-CM: 682.3  5/17/2019 - 5/29/2020 RESOLVED: Gangrene of finger of right hand ([facility] ICD-10-CM: T85 
ICD-9-CM: 785.4  5/17/2019 - 11/2/2020 RESOLVED: Bowel obstruction ([facility] ICD-10-CM: K25.417 ICD-9-CM: 560.9  1/8/2019 - 5/29/2020 RESOLVED: Partial small bowel obstruction ([facility] ICD-10-CM: K56.600 ICD-9-CM: 560.9  1/5/2019 - 5/29/2020 RESOLVED: Dyspnea ICD-10-CM: R06.00 
ICD-9-CM: 786.09  11/13/2018 - 5/29/2020 RESOLVED: ARF (acute renal failure) (HCC) ICD-10-CM: N17.9 ICD-9-CM: 584.9  11/13/2018 - 5/29/2020 RESOLVED: Closed wedge compression fracture of T7 vertebra ([facility] ICD-10-CM: K82.122H ICD-9-CM: 805.2  11/13/2018 - 5/29/2020 RESOLVED: Type 2 diabetes with nephropathy ([facility] ICD-10-CM: E11.21 
ICD-9-CM: 250.40, 583.81  10/1/2018 - 11/2/2020 RESOLVED: Chest pain ICD-10-CM: R07.9 ICD-9-CM: 786.50  6/27/2018 - 5/29/2020 RESOLVED: Abdominal pain ICD-10-CM: R10.9 ICD-9-CM: 789.00  6/27/2018 - 5/29/2020 RESOLVED: Coronary artery disease involving native coronary artery without angina pectoris (Chronic) ICD-10-CM: I25.10 ICD-9-CM: 414.01  1/22/2016 - 11/2/2020 RESOLVED: HTN (hypertension) ICD-10-CM: I10 
ICD-9-CM: 401.9  10/9/2015 - 1/22/2016 RESOLVED: NSTEMI (non-ST elevated myocardial infarction) ([facility] ICD-10-CM: I21.4 ICD-9-CM: 410.70  10/9/2015 - 5/29/2020 Subjective:  
 70 y.o. female with history that includes DM 2, CAD with MI, [facility] baseline, and A. fib on Eliquis who was recently admitted to [facility] with anemia, pneumonia, and acute on chronic CHF then transferred to rehab was sent back to the rehab due to anemia and dyspnea. She has been somewhat dyspneic for the past couple of days constant and worsening to the point of being severe even at rest with increased O2 requirements up to 4 LNC associated with orthopnea. She was found to have a hemoglobin of 6.8 at the rehab center. However, here in the ER she was found to have hemoglobin at 7.2 and she was back to her 3LNC. It was reported dark stools which tested FOBT positive. She is on Eliquis as mentioned. (Dr Emily Leung) :  Reports she if feeling better this AM with less SOB and O2 back to usual 3 liters. No ab pain. No CP. Sats ok on O2. Nurses report they are unable to get AM labs and pt needs central, PICC or mid line placed. She does not appear in any distress. Dr. Niko Sosa has ordered blood and consented pt. Her chronic GI bleeding may warrant complete DC of anticoagulation - discussed with GI and little else to do. Also consulting Cards for her chronic decompensation of CHF/A fib episodes with mult readmissions - there may be little to do for this either. :  C/O mild nausea this AM and somewhat fatigued but otherwise little change. Rate has been up and down requiring does of IV dig and Amiodarone drip. Cards also consulting EP for poss ablation. Cards and GI agree with restart of Eliquis - risk of CVA > risk of bleeds.   
  
Review of Systems: A comprehensive review of systems was negative except for that written in the HPI. Objective:  
Physical Exam:  
 
Visit Vitals BP (!) 94/56 Pulse 99 Temp 97.9 °F (36.6 °C) Resp 25 Ht 5' 7\" (1.702 m) Wt 106 kg (233 lb 11.2 oz) SpO2 100% BMI 36.60 kg/m² O2 Flow Rate (L/min): 4 l/min O2 Device: Nasal cannula Temp (24hrs), Av °F (36.7 °C), Min:97.5 °F (36.4 °C), Max:98.6 °F (37 °C) 
  701 - 1900 In: 363 [I.V.:363] Out: -     190 -  0700 In: 641.3 [I.V.:250] Out: 1600 [Urine:1600] General:  Alert, obese,cooperative, no distress, appears stated age. Head:  Normocephalic, without obvious abnormality, atraumatic. Eyes:  Conjunctivae/corneas clear. EOMs intact. Neck: Supple, symmetrical, trachea midline, no adenopathy, thyroid: no enlargement/tenderness/nodules, no carotid bruit and no JVD. Lungs:   A few basilar rales bilaterally. Chest wall:  No tenderness or deformity. Heart:  Rapid, irreg Abdomen:   Soft, non-tender. Bowel sounds normal. No masses,  No organomegaly. Extremities: no cyanosis. Trace LE edema. No calf tenderness or cords. Pulses: 2+ and symmetric all extremities. Skin:  turgor normal. No rashes Neurologic:   Alert and oriented X 3. Fine motor of hands and fingers normal.   equal.  No cogwheeling or rigidity. Gait not tested at this time. Sensation grossly normal to touch. Gross motor of extremities normal.    
 
Data Review:  
   
Recent Days: 
Recent Labs  
  02/24/21 0552 02/23/21 2119 02/22/21 2246 WBC 15.7* 14.8* 15.1* HGB 8.4* 8.3* 7.2* HCT 28.5* 27.3* 25.3*  
 288 319 Recent Labs  
  02/24/21 0552 02/23/21 2119 02/22/21 2246  140 142  
K 3.4* 3.2* 3.6  101 104 CO2 33* 34* 34* * 122* 140* BUN 30* 33* 36* CREA 1.12* 1.08* 1.25* CA 7.2* 7.3* 7.5* MG  --  2.2  --   
ALB 2.3* 2.2* 2.4* TBILI 1.0 1.2* 0.4 ALT 26 25 27 INR  --   --  1.1 No results for input(s): PH, PCO2, PO2, HCO3, FIO2 in the last 72 hours. 24 Hour Results: 
Recent Results (from the past 24 hour(s)) GLUCOSE, POC Collection Time: 02/23/21  1:02 PM  
Result Value Ref Range Glucose (POC) 56 (L) 65 - 100 mg/dL Performed by Kayla Pickerel GLUCOSE, POC Collection Time: 02/23/21  1:21 PM  
Result Value Ref Range Glucose (POC) 95 65 - 100 mg/dL Performed by Letty Fields GLUCOSE, POC Collection Time: 02/23/21  3:46 PM  
Result Value Ref Range Glucose (POC) 146 (H) 65 - 100 mg/dL Performed by Kayla Pickerel GLUCOSE, POC Collection Time: 02/23/21  6:53 PM  
Result Value Ref Range Glucose (POC) 110 (H) 65 - 100 mg/dL Performed by Jose Powell CBC WITH AUTOMATED DIFF Collection Time: 02/23/21  9:19 PM  
Result Value Ref Range WBC 14.8 (H) 3.6 - 11.0 K/uL  
 RBC 2.84 (L) 3.80 - 5.20 M/uL HGB 8.3 (L) 11.5 - 16.0 g/dL HCT 27.3 (L) 35.0 - 47.0 % MCV 96.1 80.0 - 99.0 FL  
 MCH 29.2 26.0 - 34.0 PG  
 MCHC 30.4 30.0 - 36.5 g/dL  
 RDW 15.9 (H) 11.5 - 14.5 % PLATELET 219 655 - 539 K/uL MPV 10.0 8.9 - 12.9 FL  
 NRBC 0.3 (H) 0  WBC ABSOLUTE NRBC 0.04 (H) 0.00 - 0.01 K/uL NEUTROPHILS 83 (H) 32 - 75 % LYMPHOCYTES 7 (L) 12 - 49 % MONOCYTES 5 5 - 13 % EOSINOPHILS 3 0 - 7 % BASOPHILS 1 0 - 1 % IMMATURE GRANULOCYTES 1 (H) 0.0 - 0.5 % ABS. NEUTROPHILS 12.3 (H) 1.8 - 8.0 K/UL  
 ABS. LYMPHOCYTES 1.0 0.8 - 3.5 K/UL  
 ABS. MONOCYTES 0.8 0.0 - 1.0 K/UL  
 ABS. EOSINOPHILS 0.4 0.0 - 0.4 K/UL  
 ABS. BASOPHILS 0.1 0.0 - 0.1 K/UL  
 ABS. IMM. GRANS. 0.2 (H) 0.00 - 0.04 K/UL  
 DF AUTOMATED METABOLIC PANEL, COMPREHENSIVE Collection Time: 02/23/21  9:19 PM  
Result Value Ref Range Sodium 140 136 - 145 mmol/L Potassium 3.2 (L) 3.5 - 5.1 mmol/L Chloride 101 97 - 108 mmol/L  
 CO2 34 (H) 21 - 32 mmol/L Anion gap 5 5 - 15 mmol/L Glucose 122 (H) 65 - 100 mg/dL BUN 33 (H) 6 - 20 MG/DL Creatinine 1.08 (H) 0.55 - 1.02 MG/DL  
 BUN/Creatinine ratio 31 (H) 12 - 20 GFR est AA >60 >60 ml/min/1.73m2 GFR est non-AA 50 (L) >60 ml/min/1.73m2 Calcium 7.3 (L) 8.5 - 10.1 MG/DL Bilirubin, total 1.2 (H) 0.2 - 1.0 MG/DL  
 ALT (SGPT) 25 12 - 78 U/L  
 AST (SGOT) 22 15 - 37 U/L Alk. phosphatase 190 (H) 45 - 117 U/L Protein, total 4.7 (L) 6.4 - 8.2 g/dL Albumin 2.2 (L) 3.5 - 5.0 g/dL Globulin 2.5 2.0 - 4.0 g/dL A-G Ratio 0.9 (L) 1.1 - 2.2 MAGNESIUM Collection Time: 02/23/21  9:19 PM  
Result Value Ref Range Magnesium 2.2 1.6 - 2.4 mg/dL GLUCOSE, POC Collection Time: 02/24/21 12:50 AM  
Result Value Ref Range Glucose (POC) 131 (H) 65 - 100 mg/dL Performed by Desiree Sanchez CBC WITH AUTOMATED DIFF Collection Time: 02/24/21  5:52 AM  
Result Value Ref Range WBC 15.7 (H) 3.6 - 11.0 K/uL  
 RBC 2.94 (L) 3.80 - 5.20 M/uL HGB 8.4 (L) 11.5 - 16.0 g/dL HCT 28.5 (L) 35.0 - 47.0 % MCV 96.9 80.0 - 99.0 FL  
 MCH 28.6 26.0 - 34.0 PG  
 MCHC 29.5 (L) 30.0 - 36.5 g/dL RDW 16.0 (H) 11.5 - 14.5 % PLATELET 763 955 - 605 K/uL MPV 9.8 8.9 - 12.9 FL  
 NRBC 0.4 (H) 0  WBC ABSOLUTE NRBC 0.06 (H) 0.00 - 0.01 K/uL NEUTROPHILS 80 (H) 32 - 75 % LYMPHOCYTES 7 (L) 12 - 49 % MONOCYTES 7 5 - 13 % EOSINOPHILS 3 0 - 7 % BASOPHILS 1 0 - 1 % IMMATURE GRANULOCYTES 2 (H) 0.0 - 0.5 % ABS. NEUTROPHILS 12.6 (H) 1.8 - 8.0 K/UL  
 ABS. LYMPHOCYTES 1.2 0.8 - 3.5 K/UL  
 ABS. MONOCYTES 1.0 0.0 - 1.0 K/UL  
 ABS. EOSINOPHILS 0.5 (H) 0.0 - 0.4 K/UL  
 ABS. BASOPHILS 0.1 0.0 - 0.1 K/UL  
 ABS. IMM. GRANS. 0.3 (H) 0.00 - 0.04 K/UL  
 DF AUTOMATED METABOLIC PANEL, COMPREHENSIVE Collection Time: 02/24/21  5:52 AM  
Result Value Ref Range Sodium 140 136 - 145 mmol/L Potassium 3.4 (L) 3.5 - 5.1 mmol/L Chloride 101 97 - 108 mmol/L  
 CO2 33 (H) 21 - 32 mmol/L Anion gap 6 5 - 15 mmol/L Glucose 129 (H) 65 - 100 mg/dL BUN 30 (H) 6 - 20 MG/DL Creatinine 1.12 (H) 0.55 - 1.02 MG/DL  
 BUN/Creatinine ratio 27 (H) 12 - 20 GFR est AA 58 (L) >60 ml/min/1.73m2 GFR est non-AA 48 (L) >60 ml/min/1.73m2 Calcium 7.2 (L) 8.5 - 10.1 MG/DL Bilirubin, total 1.0 0.2 - 1.0 MG/DL  
 ALT (SGPT) 26 12 - 78 U/L  
 AST (SGOT) 20 15 - 37 U/L Alk. phosphatase 195 (H) 45 - 117 U/L Protein, total 4.8 (L) 6.4 - 8.2 g/dL Albumin 2.3 (L) 3.5 - 5.0 g/dL Globulin 2.5 2.0 - 4.0 g/dL A-G Ratio 0.9 (L) 1.1 - 2.2 VITAMIN B12 Collection Time: 02/24/21  5:52 AM  
Result Value Ref Range Vitamin B12 597 193 - 986 pg/mL RETICULOCYTE COUNT Collection Time: 02/24/21  5:52 AM  
Result Value Ref Range Reticulocyte count 5.4 (H) 0.7 - 2.1 % Absolute Retic Cnt. 0.1568 (H) 0.0164 - 0.0776 M/ul SAMPLES BEING HELD Collection Time: 02/24/21  5:52 AM  
Result Value Ref Range SAMPLES BEING HELD 1LAV   
 COMMENT Add-on orders for these samples will be processed based on acceptable specimen integrity and analyte stability, which may vary by analyte. GLUCOSE, POC Collection Time: 02/24/21  5:58 AM  
Result Value Ref Range Glucose (POC) 146 (H) 65 - 100 mg/dL Performed by Dimitri Reddy (PCT) GLUCOSE, POC Collection Time: 02/24/21 12:01 PM  
Result Value Ref Range Glucose (POC) 70 65 - 100 mg/dL Performed by Fernando Nyhan Medications reviewed Current Facility-Administered Medications Medication Dose Route Frequency  metoprolol tartrate (LOPRESSOR) tablet 25 mg  25 mg Oral Q6H  
 potassium chloride SR (KLOR-CON 10) tablet 40 mEq  40 mEq Oral Q4H  
 pantoprazole (PROTONIX) 40 mg in 0.9% sodium chloride 10 mL injection  40 mg IntraVENous Q12H  
 sodium chloride (NS) flush 5-40 mL  5-40 mL IntraVENous Q8H  
 sodium chloride (NS) flush 5-40 mL  5-40 mL IntraVENous PRN  
 acetaminophen (TYLENOL) tablet 650 mg  650 mg Oral Q6H PRN Or  
 acetaminophen (TYLENOL) suppository 650 mg  650 mg Rectal Q6H PRN  polyethylene glycol (MIRALAX) packet 17 g  17 g Oral DAILY PRN  
 bisacodyL (DULCOLAX) suppository 10 mg  10 mg Rectal DAILY PRN  
 ondansetron (ZOFRAN) injection 4 mg  4 mg IntraVENous Q6H PRN  
 glucose chewable tablet 16 g  4 Tab Oral PRN  
 dextrose (D50W) injection syrg 12.5-25 g  25-50 mL IntraVENous PRN  
 glucagon (GLUCAGEN) injection 1 mg  1 mg IntraMUSCular PRN  
 insulin lispro (HUMALOG) injection   SubCUTAneous Q6H  
 0.9% sodium chloride infusion 250 mL  250 mL IntraVENous PRN  
 amiodarone (CORDARONE) 375 mg in dextrose 5% 250 mL infusion  0.5-1 mg/min IntraVENous TITRATE  apixaban (ELIQUIS) tablet 5 mg  5 mg Oral BID Care Plan discussed with: Patient/Family and Nurse Total time spent with patient: 30 minutes.  
 
Ramakrishna Dimas MD

## 2021-02-24 NOTE — PROGRESS NOTES
Chart accessed to assist primary nurse. 1504 Covid test negative. Dr Ellen Redman notified. D/C Droplet +.

## 2021-02-24 NOTE — PROGRESS NOTES
Shift Change: 
Bedside and Verbal shift change report given to Kaveh Laughlin RN (oncoming nurse) by Shameka Cedillo RN (offgoing nurse). Report included the following information SBAR, Kardex and Quality Measures. Shift Summary: 
1091: Pt reports severe abdominal pain, called Dr. Enrike Ramirez advised to order a stat port abd xray, no pain medication at this time. Orders in. End of Shift Report: 
Bedside and Verbal shift change report given to RN (oncoming nurse) by Kaveh Laughlin RN (offgoing nurse). Report included the following information SBAR, Kardex and Quality Measures.

## 2021-02-24 NOTE — CONSULTS
[facility] [place] at [address], [address] [place] [address] W: [phone]  F: [phone] Reason for Visit:  
Darrin Leavitt is a 70 y.o. female who is seen for evaluation of chronic anemia Hematology/Oncology Treatment History:  
None History of Present Illness:  
Darrin Leavitt is a pleasant 70 y.o. female who was admitted on 2/22/21 from rehab for anemia and SOB. Recent admission for anemia, PNA and chronic CHF. Rehab Hgb noted to be 6.8; in ED heme pos stool. GI consulted; hx of B12 def with recent EGD/Colonoscopy (1/4/21) that was unrevealing for source of bleeding. Ms Jaylon Lino reports has had diarrhea and abd pain since having bowel resection abd  approx 4 yrs ago. Pain is in the lower abd area and described as sharp at times. Was at rehab and Hgb continues to drop and so she is brought back to the hospital.  
 
PMHX : DM, CAD with MI, COPD (3L baseline); Afib ( on Eliquis) Past Medical History:  
Diagnosis Date  Asthma  Atrial fibrillation ([address] 11/16/2018  Autoimmune disease ([address]   
 fibromyalgia  CAD (coronary artery disease) 10/9/2015  Closed wedge compression fracture of T7 vertebra ([address] 11/13/2018  COPD (chronic obstructive pulmonary disease) (HCC)  Diabetes ([address]  DVT (deep vein thrombosis) in pregnancy  GERD (gastroesophageal reflux disease)  Heart failure ([address]  History of vascular access device 09/30/2020  
 4f midline, 12cm at 1cm out placed in L Cephal[employer] by Анна Nguyen RN  
 HTN (hypertension)  NSTEMI (non-ST elevated myocardial infarction) ([address] 10/9/2015  Obesity (BMI 30-39.9) 11/13/2018  PAD (peripheral artery disease) ([address] prior stenting  Sleep apnea   
 wears CPAP  
 Statin intolerance Past Surgical History:  
Procedure Laterality Date  COLONOSCOPY N/A 1/4/2021 COLONOSCOPY performed by Rambo Mendoza MD at [address] [place] [address] [address] [place]  HX COLOSTOMY    
 and reversal, post episiotomy repair [address]  HX UROLOGICAL  2009  
 bladder sling  IR KYPHOPLASTY LUMBAR  10/8/2020  WI ABDOMEN SURGERY PROC UNLISTED    
 hital hernia repair, gall bladder removed  WI AMPUTATION TRANSMETACARPAL Right 06/12/2019  
 entire ring finger, 2nd & 3rd fingers (transmetacarpal)  WI BREAST SURGERY PROCEDURE UNLISTED    
 lumpectomy  WI CARDIAC SURG PROCEDURE UNLIST  10/9/15  
 2 stents Social History Socioeconomic History  Marital status:  Spouse name: Not on file  Number of children: Not on file  Years of education: Not on file  Highest education level: Not on file Occupational History  Not on file Social Needs  Financial resource strain: Not on file  Food insecurity Worry: Not on file Inability: Not on file  Transportation needs Medical: Not on file Non-medical: Not on file Tobacco Use  Smoking status: Former Smoker Quit date: 3/30/2017 Years since quitting: 3.9  Smokeless tobacco: Never Used Substance and Sexual Activity  Alcohol use: No  
  Alcohol/week: 0.0 standard drinks Comment: very very rarely  Drug use: No  
 Sexual activity: Never Lifestyle  Physical activity Days per week: Not on file Minutes per session: Not on file  Stress: Not on file Relationships  Social connections Talks on phone: Not on file Gets together: Not on file Attends Sabianism service: Not on file Active member of club or organization: Not on file Attends meetings of clubs or organizations: Not on file Relationship status: Not on file  Intimate partner violence Fear of current or ex partner: Not on file Emotionally abused: Not on file Physically abused: Not on file Forced sexual activity: Not on file Other Topics Concern [address] Service Not Asked  Blood Transfusions Not Asked  Caffeine Concern Not Asked  Occupational Exposure Not Asked [facility] [employer] Not Asked  Sleep Concern Not Asked  Stress Concern Not Asked  Weight Concern Not Asked  Special Diet Not Asked  Back Care Not Asked  Exercise Not Asked  Bike Helmet Not Asked  Seat Belt Not Asked  Self-Exams Not Asked Social History Narrative  Not on file Family History Problem Relation Age of Onset  Diabetes Mother  Heart Failure Mother  Kidney Disease Mother  Diabetes Father  Heart Disease Father  Elevated Lipids Father  Heart Failure Father  Heart Disease Brother  Cancer Brother   
     kidney  Diabetes Brother  No Known Problems Brother  No Known Problems Brother Current Facility-Administered Medications Medication Dose Route Frequency  amiodarone (CORDARONE) tablet 400 mg  400 mg Oral BID  [START ON 2/28/2021] amiodarone (CORDARONE) tablet 200 mg  200 mg Oral DAILY  metoprolol tartrate (LOPRESSOR) tablet 25 mg  25 mg Oral Q6H  
 insulin lispro (HUMALOG) injection   SubCUTAneous AC&HS  
 glucose chewable tablet 16 g  4 Tab Oral PRN  
 dextrose (D50W) injection syrg 12.5-25 g  25-50 mL IntraVENous PRN  
 glucagon (GLUCAGEN) injection 1 mg  1 mg IntraMUSCular PRN  pantoprazole (PROTONIX) 40 mg in 0.9% sodium chloride 10 mL injection  40 mg IntraVENous Q12H  
 sodium chloride (NS) flush 5-40 mL  5-40 mL IntraVENous Q8H  
 sodium chloride (NS) flush 5-40 mL  5-40 mL IntraVENous PRN  
 acetaminophen (TYLENOL) tablet 650 mg  650 mg Oral Q6H PRN Or  
 acetaminophen (TYLENOL) suppository 650 mg  650 mg Rectal Q6H PRN  polyethylene glycol (MIRALAX) packet 17 g  17 g Oral DAILY PRN  
 bisacodyL (DULCOLAX) suppository 10 mg  10 mg Rectal DAILY PRN  
 ondansetron (ZOFRAN) injection 4 mg  4 mg IntraVENous Q6H PRN  
 0.9% sodium chloride infusion 250 mL  250 mL IntraVENous PRN  
  apixaban (ELIQUIS) tablet 5 mg  5 mg Oral BID Allergies Allergen Reactions  Advair Diskus [Fluticasone Propion-Salmeterol] Rash  Aspartame Other (comments)  Breo Ellipta [Fluticasone Furoate-Vilanterol] Rash  Bumex [Bumetanide] Myalgia  Ciprofloxacin Rash  Statins-Hmg-Coa Reductase Inhibitors Other (comments) Muscle pain Lipitor/crestor/zocor  Sulfa (Sulfonamide Antibiotics) Rash  Tetracycline Other (comments) musclepain  Torsemide Rash and Myalgia  Zetia [Ezetimibe] Myalgia Review of Systems: A complete review of systems was obtained, reviewed. Pertinent findings reviewed above. Physical Exam:  
 
Visit Vitals BP (!) 110/53 (BP 1 Location: Left leg, BP Patient Position: At rest) Pulse 67 Temp 98 °F (36.7 °C) Resp 21 Ht 5' 7\" (1.702 m) Wt 239 lb 14.4 oz (108.8 kg) SpO2 100% BMI 37.57 kg/m² ECOG PS: 2-3 General: obese; no distress Eyes: PERRLA, anicteric sclerae HENT: atraumatic, oropharynx clear Neck: supple Lymphatic: no cervical, supraclavicular, or inguinal adenopathy Respiratory: CTAB, normal respiratory effort: O2 in use CV: RIJ; normal rate, regular rhythm, no murmurs, 2+ BLE edema; erythema noted bilaterally GI: soft, nontender, nondistended, no masses, no hepatomegaly, no splenomegaly MS: digits without clubbing or cyanosis. Skin: no rashes, no ecchymoses, no petechia. normal temperature, turgor, and texture. Psych: alert, oriented, appropriate affect, normal judgment/insight Results:  
 
Lab Results Component Value Date/Time WBC 15.6 (H) 02/25/2021 05:45 AM  
 HGB 8.0 (L) 02/25/2021 05:45 AM  
 HCT 27.8 (L) 02/25/2021 05:45 AM  
 PLATELET 041 94/29/6585 05:45 AM  
 MCV 99.6 (H) 02/25/2021 05:45 AM  
 ABS. NEUTROPHILS 12.4 (H) 02/25/2021 05:45 AM  
 Hemoglobin (POC) 13.9 10/09/2015 12:27 PM  
 Hematocrit (POC) 26 (L) 08/01/2019 11:41 AM  
 
Lab Results Component Value Date/Time Sodium 139 02/25/2021 05:45 AM  
 Potassium 4.0 02/25/2021 05:45 AM  
 Chloride 103 02/25/2021 05:45 AM  
 CO2 32 02/25/2021 05:45 AM  
 Glucose 160 (H) 02/25/2021 05:45 AM  
 BUN 26 (H) 02/25/2021 05:45 AM  
 Creatinine 1.16 (H) 02/25/2021 05:45 AM  
 GFR est AA 56 (L) 02/25/2021 05:45 AM  
 GFR est non-AA 46 (L) 02/25/2021 05:45 AM  
 Calcium 7.4 (L) 02/25/2021 05:45 AM  
 Sodium (POC) 139 08/01/2019 11:41 AM  
 Potassium (POC) 5.2 (H) 08/01/2019 11:41 AM  
 Chloride (POC) 109 (H) 08/01/2019 11:41 AM  
 Glucose (POC) 116 (H) 02/25/2021 11:23 AM  
 BUN (POC) 22 (H) 08/01/2019 11:41 AM  
 Creatinine (POC) 1.1 08/01/2019 11:41 AM  
 Calcium, ionized (POC) 1.14 08/01/2019 11:41 AM  
 
Lab Results Component Value Date/Time Bilirubin, total 0.7 02/25/2021 05:45 AM  
 ALT (SGPT) 24 02/25/2021 05:45 AM  
 Alk. phosphatase 207 (H) 02/25/2021 05:45 AM  
 Protein, total 4.6 (L) 02/25/2021 05:45 AM  
 Albumin 2.2 (L) 02/25/2021 05:45 AM  
 Globulin 2.4 02/25/2021 05:45 AM  
 
Lab Results Component Value Date/Time Reticulocyte count 5.4 (H) 02/24/2021 05:52 AM  
 Iron % saturation 5 (L) 09/29/2020 08:27 PM  
 TIBC 305 09/29/2020 08:27 PM  
 Ferritin 105 02/25/2021 05:45 AM  
 Vitamin B12 597 02/24/2021 05:52 AM  
 Folate 13.6 02/25/2021 05:45 AM  
  (H) 02/25/2021 05:45 AM  
 Sed rate, automated 35 (H) 11/02/2020 04:26 PM  
 C-Reactive protein 5.04 (H) 01/27/2021 01:58 AM  
 TSH 3.40 01/13/2021 11:29 AM  
 Lipase 334 06/27/2018 10:49 AM  
 Hep C virus Ab Interp. NONREACTIVE 05/30/2020 12:31 AM  
 
Lab Results Component Value Date/Time INR 1.1 02/22/2021 10:46 PM  
 aPTT 26.1 02/22/2021 10:46 PM  
 D-dimer 1.77 (H) 01/27/2021 01:58 AM  
 Fibrinogen 425 01/27/2021 01:58 AM  
 
Lab Results Component Value Date/Time  (H) 02/25/2021 05:45 AM  
 
1/1/21 Peripheral smear Normocytic anemia with moderate anisopoikilocytosis, polychromasia, and scattered nucleated red blood cells; no increase in schistocytes identified Leukocytosis comprised predominantly of mature neutrophils with toxic granularity Thrombocytosis Stool blood 1/1/2021: positive Stool blood 2/22/2021: positive 1/4/21 EGD/Colonoscopy/ Dr Meaghan Cruz Impression: 
Candida esophagitis, gastritis, duodenitis, colon polyps, diverticulosis. Antral mass likely a benign lipoma 
  
2/23/21 XR CHEST IMPRESSION Right internal jugular central venous catheter appears to be in 
satisfactory position. No evidence of placement related complication. Otherwise 
stable appearance of the chest. 
 
2/24/21 XR 3rd finger rt IMPRESSION Soft tissue swelling. 
 
2/24/21 XR ABD PORT IMPRESSION 1. Presence of a moderate amount of gas within the colon (see discussion above). 2. Interval performance of a vertebroplasty procedure involving L3. 
 
2/25/21 CT A/P 
pending Assessment/Recommendations:  
 
1. Anemia, normocytic S/p 1 unit pRBC This appears to be secondary to blood loss, based on the elevated retic and the repeated positive stool blood tests. Hemolysis seems much less likely, though I have ordered labs to evaluate for this. With low iron saturation of 11%, I will give her some IV iron to see if we can help speed her recovery. She has known B12 deficiency, but her level now is normal with therapy. The source of blood loss is not clear. EGD and colonoscopy unrevealing. She previously has declined capsule endoscopy, but she is now agreeable. I have also ordered a CT to evaluate for blood loss. For now, monitor her HGB and transfuse PRN for HGB <7. 
 
 
2. Abdominal pain, Diarrhea, Nausea Uncertain etiology. I will get a CT A/P to evaluate. 3. Afib On apixaban. Cardiology and GI have discussed risks/benefits and made the decision to resume therapy. Cardiology considering pacemaker. I have personally seen and evaluated the patient in conjunction with Celia Pisano NP. I personally performed the history and physical exam and rendered the above assessment and plan.  
 
 
Signed By: Terry Kasper MD

## 2021-02-24 NOTE — PROGRESS NOTES
[address] at [facility] 88 [phone] I was asked to see patient for anemia. Chart reviewed. I will order some additional labs and see her formally in the morning. Lab Results Component Value Date/Time WBC 15.7 (H) 02/24/2021 05:52 AM  
 HGB 8.4 (L) 02/24/2021 05:52 AM  
 HCT 28.5 (L) 02/24/2021 05:52 AM  
 PLATELET 567 30/70/2592 05:52 AM  
 MCV 96.9 02/24/2021 05:52 AM  
 ABS. NEUTROPHILS 12.6 (H) 02/24/2021 05:52 AM  
 Hemoglobin (POC) 13.9 10/09/2015 12:27 PM  
 Hematocrit (POC) 26 (L) 08/01/2019 11:41 AM  
 
Lab Results Component Value Date/Time Reticulocyte count 5.4 (H) 02/24/2021 05:52 AM  
 Iron % saturation 5 (L) 09/29/2020 08:27 PM  
 TIBC 305 09/29/2020 08:27 PM  
 Ferritin 69 01/27/2021 01:58 AM  
 Vitamin B12 597 02/24/2021 05:52 AM  
  01/27/2021 01:58 AM  
 Sed rate, automated 35 (H) 11/02/2020 04:26 PM  
 C-Reactive protein 5.04 (H) 01/27/2021 01:58 AM  
 TSH 3.40 01/13/2021 11:29 AM  
 Lipase 334 06/27/2018 10:49 AM  
 Hep C virus Ab Interp. NONREACTIVE 05/30/2020 12:31 AM  
 
Lab Results Component Value Date/Time INR 1.1 02/22/2021 10:46 PM  
 aPTT 26.1 02/22/2021 10:46 PM  
 D-dimer 1.77 (H) 01/27/2021 01:58 AM  
 Fibrinogen 425 01/27/2021 01:58 AM  
 
 
Smear 1/1/2021: 
Normocytic anemia with moderate anisopoikilocytosis, polychromasia, and scattered nucleated red blood cells; no increase in schistocytes identified Leukocytosis comprised predominantly of mature neutrophils with toxic granularity Thrombocytosis Stool blood 1/1/2021: positive Stool blood 2/22/2021: positive

## 2021-02-24 NOTE — PROGRESS NOTES
Cardiology Progress Note [address] [facility], [address] Phone [phone]; Fax [phone] 
 
 
 
2021 9:13 AM  
 
Admit Date:           2021 Admit Diagnosis:  Symptomatic anemia [D64.9] GIB (gastrointestinal bleeding) [K92.2] Acute blood loss anemia [D62] Dyspnea [R06.00] :          1945 MRN:          [id number] Impression Plan/Recommendation PAF 
GI bleed Anemia EDWARDS Chronic resp failure on 3 L home O2 Copd Chronic HFpEF 
COVID PUI Deconditioned · Okay to resume eliquis per GI. Hgb better/stable s/p 1 unit of PRBCs. Does not appear to be actively bleeding. · On amiodarone gtt, received IV digoxin last night rate still 110-120's 's. Will try low dose lopressor. PAF has been an ongoing issue during her multiple admissions and now unable to control HR, limited w medications d/t soft BP. Will discuss and consult EP for pacemaker and AV node ablation. WBC chronically 13-15. She is afebrile. Recommend checking urine. · Replete K · Good UOP s/p IV lasix, mild pulm edema on xray. Will diuresis prn/tolerated · PCR covid still pending Spoke with Adam Crawford in detail about pacemaker and AV skyler ablation- she is agreeable with plan . Reviewed above w the patient as well- she is agreeable to ppm and Av node ablation Chart reviewed. Agree with advanced NP's history, exam and  A/P with changes/additons. Visit Vitals BP (!) 94/56 Pulse 99 Temp 97.9 °F (36.6 °C) Resp 25 Ht 5' 7\" (1.702 m) Wt 233 lb 11.2 oz (106 kg) SpO2 100% BMI 36.60 kg/m² Afib with RVR - EP consult Reid Abraham MD, [facility] 
 
 
 
  
 8032 -  190 In: 363 [I.V.:363] Out: - Last 3 Recorded Weights in this Encounter 21 2200 21 0253 Weight: 236 lb (107 kg) 233 lb 11.2 oz (106 kg) 02/22 1901 - 02/24 0700 In: 641.3 [I.V.:250] Out: 1600 [Urine:1600] SUBJECTIVE Jos Edward doesn't feel well, nausea. No chest pain Agreeable to pacemake and AV node ablation Breathing okay Current Facility-Administered Medications Medication Dose Route Frequency  metoprolol tartrate (LOPRESSOR) tablet 25 mg  25 mg Oral Q6H  
 potassium chloride SR (KLOR-CON 10) tablet 40 mEq  40 mEq Oral Q4H  
 pantoprazole (PROTONIX) 40 mg in 0.9% sodium chloride 10 mL injection  40 mg IntraVENous Q12H  
 sodium chloride (NS) flush 5-40 mL  5-40 mL IntraVENous Q8H  
 sodium chloride (NS) flush 5-40 mL  5-40 mL IntraVENous PRN  
 acetaminophen (TYLENOL) tablet 650 mg  650 mg Oral Q6H PRN Or  
 acetaminophen (TYLENOL) suppository 650 mg  650 mg Rectal Q6H PRN  polyethylene glycol (MIRALAX) packet 17 g  17 g Oral DAILY PRN  
 bisacodyL (DULCOLAX) suppository 10 mg  10 mg Rectal DAILY PRN  
 ondansetron (ZOFRAN) injection 4 mg  4 mg IntraVENous Q6H PRN  
 glucose chewable tablet 16 g  4 Tab Oral PRN  
 dextrose (D50W) injection syrg 12.5-25 g  25-50 mL IntraVENous PRN  
 glucagon (GLUCAGEN) injection 1 mg  1 mg IntraMUSCular PRN  
 insulin lispro (HUMALOG) injection   SubCUTAneous Q6H  
 0.9% sodium chloride infusion 250 mL  250 mL IntraVENous PRN  
 amiodarone (CORDARONE) 375 mg in dextrose 5% 250 mL infusion  0.5-1 mg/min IntraVENous TITRATE  apixaban (ELIQUIS) tablet 5 mg  5 mg Oral BID OBJECTIVE Intake/Output Summary (Last 24 hours) at 2/24/2021 6387 Last data filed at 2/24/2021 0731 Gross per 24 hour Intake 1004.3 ml Output 1600 ml Net -595.7 ml Review of Systems - History obtained from the patient AS PER  HPI Telemetry Af v rate 110-120s PHYSICAL EXAM  
  
 
Visit Vitals BP (!) 106/55 (BP 1 Location: Left upper arm, BP Patient Position: At rest) Pulse (!) 108 Temp 97.6 °F (36.4 °C) Resp 24 Ht 5' 7\" (1.702 m) Wt 233 lb 11.2 oz (106 kg) SpO2 100% BMI 36.60 kg/m² Gen: Well-developed, obese, ill appearing,  in no acute distress  alert and oriented x 3 HEENT:  Pink conjunctivae, [place]. No scleral icterus or conjunctival, moist mucous membranes Neck: Supple, CVL Resp: + accessory muscle use, Clear breath sounds anterior Card: Irregular/accelerated Rate,Rythm,No murmurs, rubs or gallop. No thrills. GI:          soft, non-tender MSK: No cyanosis or clubbing Skin: No rashes or ulcers, ecchymosis Neuro:  Cranial nerves are grossly intact, moving all four extremities, no focal deficit, follows commands appropriately Psych:  Good insight, oriented to person, place and time, alert, Nml Affect LE: +3 pitting BLE edema. Erythema/purplish discoloration  to BLE  
  
 
DATA REVIEW Laboratory and Imaging have been reviewed by me and are notable for Recent Labs  
  02/22/21 2246 TROIQ <0.05 Recent Labs  
  02/24/21 
0552 02/23/21 
2119 02/22/21 
2246  140 142  
K 3.4* 3.2* 3.6 CO2 33* 34* 34* BUN 30* 33* 36* CREA 1.12* 1.08* 1.25* * 122* 140* MG  --  2.2  --   
WBC 15.7* 14.8* 15.1* HGB 8.4* 8.3* 7.2* HCT 28.5* 27.3* 25.3*  
 288 319 Galdino Cooper, NP

## 2021-02-24 NOTE — WOUND CARE
New Consult for wounds ASSESSMENT: 
Reviewed chart, patient laying in [place] air mattress, sleeping but awakes easily, orientedx4. Able to turn to side with small assistance, Purewick in place, incontinent of formed dark brown stool. Incontinent care given. Nurse Araceli Reveles at bedside for care. Patient complains of nausea, Nurse to give zofran. Patient stated [employer] caring for wounds and x-ray was taken of right middle finger for pain and redness. All skin folds and bony prominences assessed Bilateral heels blanchable redness with  Peeling dry skin . Heels offloaded with pillows. Buttocks/sacrum blanchable redness, skin intact Palpable DP pulses bilaterally . Generalized edema and blanching erythema to lower legs and feet. POA left great toe full thickness=. 4x.4x0.2cm 100% slough with white edges, moist periwound. POA Right middle finger,healed amputatation at first knuckle. Pinpoint open area of white wound bed,warm to touch and reddened, no noted drainage but intensely painful for patient to palpation. patient stated MD at [address] [place] [address] x-rayed finger 2 days ago. POA Right hand partial thickness 1x0.4x0.1 pink/red, moist some bleeding after cleansed with NSS. Noted resurfacing. Recommendations: 
Spoke with  concerning right middle finger pain and redness. Orders for  x-ray of finger placed. Left great toe wound: cleanse with NSS and apply medi-honey to wound and cover with dry dressing, change every 3 days Right middle finger: cleanse with NSS and apply medi-honey to wound and cover with dry dressing,change every 3 days Right hand wound: cover with duoderm or hydrocolloid , change every 3 days Turn reposition approximately every 2 hours and offload heels with pillows at all times in bed. Z-guard cream to buttocks and sacrum daily and as needed with incontinence care Minimize layers of linen/-pads under patient to optimize support surface. Discussed with  and Madi Transition of Care: Plan to follow and  as needed while admitted to hospital.

## 2021-02-24 NOTE — PROGRESS NOTES
Bedside and Verbal shift change report given to [facility] RN (oncoming nurse) by Zac Cates (offgoing nurse). Report included the following information SBAR, Kardex, Intake/Output, Accordion and Recent Results. SHIFT REPORT: 
 
DOCUMENTING IN DISASTER MODE 
 
2021: placed call to Dr. Kaylynn Espinal about central line and HR and BP 
 
2130: Dr. Sunday Vazquez at bedside to place line 2222: per Dr. Minnie Kearns, HR okay in 110's. Call back if pt sustains in 130s 
 
0018: spoke with dr France De La Cruz and received new orders for HR sustaining 130-150s' 7872: prn tylenol given for 8/10 abd pain Bedside and Verbal shift change report given to Nava Arroyo 94 (oncoming nurse) by [facility] RN  (offgoing nurse). Report included the following information SBAR, Kardex, Intake/Output, Accordion and Recent Results.

## 2021-02-24 NOTE — PROGRESS NOTES
2/24/2021  
3:04 PM 
EMR reviewed, cardiology consult completed planning for pacer and AV Node ablation. CM discussed pt w/ Chente Quintana cardiology NP re: palliative consult and hem/onc for chronic anemia, CM discussed w/ Dr Frank Chau who agrees, authorized nursing to place consults Will follow and assist. 
ERIC Chan  9:08 AM 
\TRANSFER - IN REPORT: 
 
Verbal report received  From [facility] on [address]  being transferred to Med/Surg 4 for transfer of care Report consisted of patients Situation, Background, Assessment and  
Recommendations(SBAR). Transition of Care Plan from [facility] RUR 50 % High Risk Is This a Readmission YES Is this a Bundle  Yes Transitions of Care Plan: LOS 1 Day 1. Readmit for acute blood loss anemia 2. GI, Cardiology, and Family Practice(Genny) following 3. Heart failure bundle 4. From [facility], planning to return at discharge 5. Will need 2 negative COVID tests prior to return to [facility] 6. Pt has a RUR score of 50%/ high risk for readmission 7. Likely [facility] ambulance transport at discharge 
ERIC Chan

## 2021-02-24 NOTE — CONSULTS
Central Line Procedure Note Name:  Nick Betancur Age:  70 y.o. MRN:  [id number] CSN:  [id number] :  1945 Referring physician: Cullen Mullins MD  
 
Indication: central Line for venous access Risks, benefits, alternatives explained and patient agrees to proceed. Patient positioned in Trendelenburg. 7-Step Sterility Protocol followed. (cap, mask sterile gown, sterile gloves, large sterile sheet, hand hygiene, 2% chlorhexidine for cutaneous antisepsis) Right Internal Jugular cannulated x 1 attempt(s) using ultrasound guidance. Catheter secured & Biopatch applied. Sterile Tegaderm placed. CXR confirms appropriate placement of central line and no pneumothorax.  
 
Phu Marte MD

## 2021-02-24 NOTE — PROGRESS NOTES
2/24/2021 8:51 AM TRANSFER - OUT REPORT: 
 
Verbal report given to More Lopez on Sari Arriola being transferred to [facility] for change in patient condition( ) Report consisted of patients Situation, Background, Assessment and  
Recommendations(SBAR). Transitions of Care Plan: 1. Readmit for acute blood loss anemia 2. GI, Cardiology, and Family Practice(Genny) following 3. Heart failure bundle 4. From [facility], planning to return at discharge 5. Will need 2 negative COVID tests prior to return to [facility] 6. Pt has a RUR score of 50%/ high risk for readmission 7. Likely [facility] ambulance transport at discharge

## 2021-02-25 NOTE — PROGRESS NOTES
Bedside and Verbal shift change report given to Tho Meng (oncoming nurse) by Veronika Earl (offgoing nurse). Report included the following information SBAR, Kardex, Intake/Output, Accordion and Recent Results. SHIFT REPORT: 
 
DOCUMENTING IN DISASTER MODE Bedside and Verbal shift change report given to Roseline Arango (oncoming nurse) by Louise Shetty RN  (offgoing nurse). Report included the following information SBAR, Kardex, Intake/Output, Accordion and Recent Results.

## 2021-02-25 NOTE — PROGRESS NOTES
Spiritual Care Assessment/Progress Note [facility] 
 
 
NAME: Colletta Hummingbird      MRN: [id number] AGE: 70 y.o. SEX: female Sikhism Affiliation: Jehovah's witness Language: English  
 
2/25/2021     Total Time (in minutes): 7 Spiritual Assessment begun in SF 3 PRO CARE TELE 2 through conversation with: 
  
    []Patient        [] Family    [] Friend(s) Reason for Consult: Palliative Care, Initial/Spiritual Assessment Spiritual beliefs: (Please include comment if needed) 
   [] Identifies with a leanna tradition:     
   [] Supported by a leanna community:        
   [] Claims no spiritual orientation:       
   [] Seeking spiritual identity:            
   [] Adheres to an individual form of spirituality:       
   [x] Not able to assess:                   
 
    
Identified resources for coping:  
   [] Prayer                           
   [] Music                  [] Guided Imagery 
   [] Family/friends                 [] Pet visits [] Devotional reading                         [x] Unknown 
   [] Other:                                         
 
 
Interventions offered during this visit: (See comments for more details) Patient Interventions: Other (comment)(Unable to assess) Plan of Care: 
 
 [] Support spiritual and/or cultural needs  
 [] Support AMD and/or advance care planning process    
 [] Support grieving process 
 [] Coordinate Rites and/or Rituals  
 [] Coordination with community clergy [] No spiritual needs identified at this time 
 [] Detailed Plan of Care below (See Comments)  [] Make referral to Music Therapy 
[] Make referral to Pet Therapy    
[] Make referral to Addiction services 
[] Make referral to [facility] 
[] Make referral to Spiritual Care Partner 
[] No future visits requested       
[x] Follow up upon further referrals Comments:  attempted to visit Mrs. Isabel Romo for an initial spiritual assessment on the [facility] unit. Mrs. Isabel Romo was being assessed by other providers at the time of the 's visit. 's are available for further support upon referral 
Amarilis Glynn.  Paging Service: [phone])

## 2021-02-25 NOTE — PROGRESS NOTES
Comprehensive Nutrition Assessment Type and Reason for Visit: Initial, RD nutrition re-screen/LOS Nutrition Recommendations/Plan: 1. Continue consistent carb diet. 2. Add chocolate Glucerna BID to promote adequate intake. Nutrition Assessment:     
2/25: 71 y/o female admitted with GIB, symptomatic anemia. PMH includes DM, COPD, CAD, PAD, PVD. Pt sleeping soundly at time of visit. Lunch tray in room untouched so far. Recorded intake of 0% breakfast this AM. Per EMr, pt has had 4.5% wt loss x1 month which is not considered significant for time frame, however, pt also with significant edema so wt loss may be more significant than CBW shows. RD familiar with pt from previous admissions. Pt has liked Glucerna in the past, will add while here to promote intake. Will continue to monitor. Labs- Hgb 8.0, Ca 7.4, Cr 1.16, -145-160. Meds- insulin, Zofran, Protonix. Intakes: 
Patient Vitals for the past 168 hrs: 
 % Diet Eaten 02/25/21 1352 0 %  
02/25/21 1116 0 % Weight hx: Wt Readings from Last 10 Encounters:  
02/25/21 108.8 kg (239 lb 14.4 oz) 02/15/21 105.2 kg (232 lb) 02/08/21 115.2 kg (254 lb)  
01/21/21 113.4 kg (250 lb) 01/15/21 113.4 kg (250 lb) 11/02/20 115.7 kg (255 lb) 10/09/20 116 kg (255 lb 11.2 oz)  
07/31/20 115.6 kg (254 lb 12.8 oz) 05/30/20 111.7 kg (246 lb 3.2 oz)  
03/23/20 113.4 kg (250 lb) Malnutrition Assessment: 
Malnutrition Status: insufficient data Estimated Daily Nutrient Needs: 
Energy (kcal): 1428(4516 x 1.3 AF); Weight Used for Energy Requirements: Current Protein (g): 108(1-1.2 g/kg); Weight Used for Protein Requirements: Current Fluid (ml/day): 2115; Method Used for Fluid Requirements: 1 ml/kcal 
 
 
Nutrition Related Findings:  last BM 2/23; 4+ pitting LE edema Wounds:   
Multiple(arm skin tear, knee blister, buttock blanchable redness) Current Nutrition Therapies: DIET DIABETIC CONSISTENT CARB Regular DIET NUTRITIONAL SUPPLEMENTS Breakfast, Dinner; Glucerna Edwin Anthropometric Measures: 
· Height:  5' 7\" (170.2 cm) · Current Body Wt:  108.8 kg (239 lb 13.8 oz) · Admission Body Wt:      
· Usual Body Wt:       
· Ideal Body Wt:  135 lbs:  177.7 % · Adjusted Body Weight:   ; Weight Adjustment for: No adjustment · Adjusted BMI:      
· BMI Category:  Obese class 2 (BMI 35.0-39. 9) Nutrition Diagnosis:  
· Inadequate oral intake related to inadequate protein-energy intake as evidenced by intake 0-25%, weight loss Nutrition Interventions:  
Food and/or Nutrient Delivery: Continue current diet, Start oral nutrition supplement Nutrition Education and Counseling: No recommendations at this time Coordination of Nutrition Care: Continue to monitor while inpatient Goals: 
PO intake >50% meals + ONS next 2-4 days Nutrition Monitoring and Evaluation:  
Behavioral-Environmental Outcomes: None identified Food/Nutrient Intake Outcomes: Food and nutrient intake, Supplement intake Physical Signs/Symptoms Outcomes: Weight, Biochemical data, Skin Discharge Planning:   
Continue current diet, Continue oral nutrition supplement Electronically signed by Geoffrey Fuchs RDN on 2/25/2021 at 2:27 PM 
 
Contact: [phone]

## 2021-02-25 NOTE — PROGRESS NOTES
Bedside and Verbal shift change report received from [facility] (offgoing nurse). Report included the following information SBAR, Kardex, Intake/Output, Accordion and Recent Results.

## 2021-02-25 NOTE — PROGRESS NOTES
Daily Progress Note: 2/25/2021 Keiko Crum MD 
 
Assessment/Plan:  
Acute blood loss anemia due to GIB / Symptomatic anemia:  Admit for further workup and treatment. NPO.  IVF. PPI. Supportive care. transfuse as needed. GI consulted. Mechanical DVT prophylaxis only due to bleeding 
  
Acute on chronic systolic CHF /  Elevated BNP:   
-  Cardiology consulted.  
  
Dyspnea: currently improved to baseline 
  
COPD: Appears to be stable and at baseline with no exacerbation. Continue with baseline oxygen supplementation and increase as needed. Scheduled and as needed bronchodilators. 
  
Atrial fibrillation ([address] (11/16/2018) on Eliquis: Chronic but rate controlled.  
  
DM type 2:  Monitor blood glucose levels with insulin sliding scale coverage. No basal coverage for now. Monitor for complications.  
  
Body mass index is 36.96 kg/m².: 30.0 - 39.9 Obese   
   
 
Problem List: 
Problem List as of 2/25/2021 Date Reviewed: 11/2/2020 Codes Class Noted - Resolved Elevated brain natriuretic peptide (BNP) level ICD-10-CM: R79.89 ICD-9-CM: 790.99  2/23/2021 - Present Acute blood loss anemia ICD-10-CM: D62 
ICD-9-CM: 285.1  2/23/2021 - Present Dyspnea ICD-10-CM: R06.00 
ICD-9-CM: 786.09  2/23/2021 - Present GIB (gastrointestinal bleeding) ICD-10-CM: K92.2 ICD-9-CM: 578.9  2/23/2021 - Present Symptomatic anemia ICD-10-CM: D64.9 ICD-9-CM: 285.9  2/23/2021 - Present Pneumonia ICD-10-CM: J18.9 ICD-9-CM: 754  1/26/2021 - Present SOB (shortness of breath) ICD-10-CM: R06.02 
ICD-9-CM: 786.05  1/26/2021 - Present Anemia ICD-10-CM: D64.9 ICD-9-CM: 285.9  1/26/2021 - Present Acute on chronic systolic CHF (congestive heart failure) (HCC) ICD-10-CM: V47.66 ICD-9-CM: 428.23, 428.0  1/26/2021 - Present Uncontrolled type 2 diabetes mellitus with hyperglycemia (HCC) ICD-10-CM: E11.65 ICD-9-CM: 250.02  1/26/2021 - Present Left lower lobe pneumonia ICD-10-CM: J18.9 ICD-9-CM: 249  1/1/2021 - Present Leukocytosis ICD-10-CM: D88.752 ICD-9-CM: 288.60  11/3/2020 - Present Chronic respiratory failure with hypoxia [facility]) ICD-10-CM: J96.11 
ICD-9-CM: 518.83, 799.02  11/2/2020 - Present Overview Signed 11/2/2020 10:12 PM by Gina Jeffries MD  
  On 3L NC at home Cellulitis of lower extremity ICD-10-CM: L03.119 ICD-9-CM: 682.6  3/23/2020 - Present ASO (arteriosclerosis obliterans) ICD-10-CM: I70.90 ICD-9-CM: 440.9  9/5/2019 - Present PVD (peripheral vascular disease) ([facility] ICD-10-CM: I73.9 ICD-9-CM: 443.9  8/1/2019 - Present Severe obesity ([facility] ICD-10-CM: E66.01 
ICD-9-CM: 278.01  7/30/2019 - Present COPD (chronic obstructive pulmonary disease) ([facility]) ICD-10-CM: J44.9 ICD-9-CM: 225  7/13/2019 - Present Normocytic anemia ICD-10-CM: D64.9 ICD-9-CM: 285.9  7/13/2019 - Present PAD (peripheral artery disease) ([facility]) ICD-10-CM: I73.9 ICD-9-CM: 443.9  7/13/2019 - Present Mild intermittent asthma ICD-10-CM: J45.20 ICD-9-CM: 493.90  5/17/2019 - Present Brachial artery thrombus (HCC) ICD-10-CM: D20.6 ICD-9-CM: 444.21  4/26/2019 - Present Sleep apnea ICD-10-CM: G47.30 ICD-9-CM: 780.57  1/5/2019 - Present Overview Signed 1/5/2019  8:41 PM by Enio Islas DO  
  wears CPAP Atrial fibrillation [facility]) ICD-10-CM: I48.91 
ICD-9-CM: 427.31  11/16/2018 - Present Obesity (BMI 30-39.9) (Chronic) ICD-10-CM: H66.3 ICD-9-CM: 278.00  11/13/2018 - Present Recurrent deep vein thrombosis (DVT) (HCC) ICD-10-CM: I82.409 ICD-9-CM: 453.40  3/30/2018 - Present Chronic anticoagulation (Chronic) ICD-10-CM: Z79.01 
ICD-9-CM: V58.61  3/30/2018 - Present Essential hypertension (Chronic) ICD-10-CM: I10 
ICD-9-CM: 401.9  1/22/2016 - Present CAD (coronary artery disease) ICD-10-CM: I25.10 ICD-9-CM: 414.00  10/9/2015 - Present Type II diabetes mellitus (HCC) (Chronic) ICD-10-CM: E11.9 ICD-9-CM: 250.00  10/9/2015 - Present RESOLVED: Syncope and collapse ICD-10-CM: R55 
ICD-9-CM: 780.2  9/29/2020 - 11/2/2020 RESOLVED: Cellulitis ICD-10-CM: L03.90 ICD-9-CM: 682.9  3/23/2020 - 5/29/2020 RESOLVED: Hypokalemia ICD-10-CM: E87.6 ICD-9-CM: 276.8  3/23/2020 - 5/29/2020 RESOLVED: Hyponatremia ICD-10-CM: E87.1 ICD-9-CM: 276.1  3/23/2020 - 5/29/2020 RESOLVED: Diarrhea ICD-10-CM: R19.7 ICD-9-CM: 787.91  3/23/2020 - 5/29/2020 RESOLVED: Syncope and collapse ICD-10-CM: R55 
ICD-9-CM: 780.2  7/13/2019 - 5/29/2020 RESOLVED: UTI (urinary tract infection) ICD-10-CM: N39.0 ICD-9-CM: 599.0  7/13/2019 - 11/2/2020 RESOLVED: Sepsis ([facility] ICD-10-CM: A41.9 ICD-9-CM: 038.9, 995.91  7/13/2019 - 11/3/2020 RESOLVED: Leg wound, left ICD-10-CM: H69.790P ICD-9-CM: 891.0  7/13/2019 - 5/29/2020 RESOLVED: Leg laceration, right, initial encounter ICD-10-CM: N41.951L ICD-9-CM: 894.0  7/13/2019 - 5/29/2020 RESOLVED: Cellulitis of right upper arm ICD-10-CM: L03.113 ICD-9-CM: 682.3  5/17/2019 - 5/29/2020 RESOLVED: Gangrene of finger of right hand ([facility] ICD-10-CM: R03 
ICD-9-CM: 785.4  5/17/2019 - 11/2/2020 RESOLVED: Bowel obstruction ([facility] ICD-10-CM: A39.750 ICD-9-CM: 560.9  1/8/2019 - 5/29/2020 RESOLVED: Partial small bowel obstruction ([facility] ICD-10-CM: K56.600 ICD-9-CM: 560.9  1/5/2019 - 5/29/2020 RESOLVED: Dyspnea ICD-10-CM: R06.00 
ICD-9-CM: 786.09  11/13/2018 - 5/29/2020 RESOLVED: ARF (acute renal failure) (HCC) ICD-10-CM: N17.9 ICD-9-CM: 584.9  11/13/2018 - 5/29/2020 RESOLVED: Closed wedge compression fracture of T7 vertebra ([facility] ICD-10-CM: D37.113P ICD-9-CM: 805.2  11/13/2018 - 5/29/2020  RESOLVED: Type 2 diabetes with nephropathy (HCC) ICD-10-CM: E11.21 
 ICD-9-CM: 250.40, 583.81  10/1/2018 - 11/2/2020 RESOLVED: Chest pain ICD-10-CM: R07.9 ICD-9-CM: 786.50  6/27/2018 - 5/29/2020 RESOLVED: Abdominal pain ICD-10-CM: R10.9 ICD-9-CM: 789.00  6/27/2018 - 5/29/2020 RESOLVED: Coronary artery disease involving native coronary artery without angina pectoris (Chronic) ICD-10-CM: I25.10 ICD-9-CM: 414.01  1/22/2016 - 11/2/2020 RESOLVED: HTN (hypertension) ICD-10-CM: I10 
ICD-9-CM: 401.9  10/9/2015 - 1/22/2016 RESOLVED: NSTEMI (non-ST elevated myocardial infarction) ([facility] ICD-10-CM: I21.4 ICD-9-CM: 410.70  10/9/2015 - 5/29/2020 Subjective:  
 70 y.o. female with history that includes DM 2, CAD with MI, COPD on 3LNC baseline, and A. fib on Eliquis who was recently admitted to [facility] with anemia, pneumonia, and acute on chronic CHF then transferred to rehab was sent back to the rehab due to anemia and dyspnea. She has been somewhat dyspneic for the past couple of days constant and worsening to the point of being severe even at rest with increased O2 requirements up to 4 LNC associated with orthopnea. She was found to have a hemoglobin of 6.8 at the rehab center. However, here in the ER she was found to have hemoglobin at 7.2 and she was back to her 3LNC. It was reported dark stools which tested FOBT positive. She is on Eliquis as mentioned. (Dr Javed Akins) 2/23:  Reports she if feeling better this AM with less SOB and O2 back to usual 3 liters. No ab pain. No CP. Sats ok on O2. Nurses report they are unable to get AM labs and pt needs central, PICC or mid line placed. She does not appear in any distress. Dr. Javed Akins has ordered blood and consented pt. Her chronic GI bleeding may warrant complete DC of anticoagulation - discussed with GI and little else to do. Also consulting Cards for her chronic decompensation of CHF/A fib episodes with mult readmissions - there may be little to do for this either. :  C/O mild nausea this AM and somewhat fatigued but otherwise little change. Rate has been up and down requiring does of IV dig and Amiodarone drip. Cards also consulting EP for poss ablation. Cards and GI agree with restart of Eliquis - risk of CVA > risk of bleeds. :  Still c/o nausea and now having loose stools - 3 overnight. Declines capsule study for GI bleeding. Also, at this time, reports she does not want to have pacer or any other studies/treatments and wants to consider Hospice. Discussed risks v benefits of continued work up and treatment of problems but she reports \"getting tired of having things done to me. \"  She wants to have an information session with Hospice for further consideration.   
  
Review of Systems: A comprehensive review of systems was negative except for that written in the HPI. Objective:  
Physical Exam:  
 
Visit Vitals BP (!) 108/47 (BP 1 Location: Left upper arm, BP Patient Position: At rest) Pulse 74 Temp 98.9 °F (37.2 °C) Resp 27 Ht 5' 7\" (1.702 m) Wt 108.8 kg (239 lb 14.4 oz) SpO2 100% BMI 37.57 kg/m² O2 Flow Rate (L/min): 4 l/min O2 Device: Nasal cannula Temp (24hrs), Av.4 °F (36.9 °C), Min:97.6 °F (36.4 °C), Max:99 °F (37.2 °C) No intake/output data recorded. 1901 -  0700 In: 1250.3 [P.O.:415; I.V.:835.3] Out: 850 [Urine:850] General:  Alert, obese,cooperative, no distress, appears stated age. Head:  Normocephalic, without obvious abnormality, atraumatic. Eyes:  Conjunctivae/corneas clear. EOMs intact. Neck: Supple, symmetrical, trachea midline, no adenopathy, thyroid: no enlargement/tenderness/nodules, no carotid bruit and no JVD. Lungs:   A few basilar rales bilaterally. Chest wall:  No tenderness or deformity. Heart:  Rapid, irreg Abdomen:   Soft, mild generalized tenderness without rebound or guarding. Bowel sounds normal. No masses,  No organomegaly. Extremities: no cyanosis. Trace LE edema. No calf tenderness or cords. Pulses: 2+ and symmetric all extremities. Skin:  turgor normal. No rashes Neurologic:   Alert and oriented X 3. Fine motor of hands and fingers normal.   equal.  No cogwheeling or rigidity. Gait not tested at this time. Sensation grossly normal to touch. Gross motor of extremities normal.    
 
Data Review:  
   
Recent Days: 
Recent Labs  
  02/25/21 0545 02/24/21 0552 02/23/21 2119 WBC 15.6* 15.7* 14.8* HGB 8.0* 8.4* 8.3* HCT 27.8* 28.5* 27.3*  
 297 288 Recent Labs  
  02/25/21 0545 02/24/21 0552 02/23/21 2119 02/22/21 
2246  140 140 142  
K 4.0 3.4* 3.2* 3.6  101 101 104 CO2 32 33* 34* 34* * 129* 122* 140* BUN 26* 30* 33* 36* CREA 1.16* 1.12* 1.08* 1.25* CA 7.4* 7.2* 7.3* 7.5* MG  --   --  2.2  --   
ALB 2.2* 2.3* 2.2* 2.4* TBILI 0.7 1.0 1.2* 0.4 ALT 24 26 25 27 INR  --   --   --  1.1 No results for input(s): PH, PCO2, PO2, HCO3, FIO2 in the last 72 hours. 24 Hour Results: 
Recent Results (from the past 24 hour(s)) GLUCOSE, POC Collection Time: 02/24/21 12:01 PM  
Result Value Ref Range Glucose (POC) 70 65 - 100 mg/dL Performed by Johnna Wolff GLUCOSE, POC Collection Time: 02/24/21  6:00 PM  
Result Value Ref Range Glucose (POC) 114 (H) 65 - 100 mg/dL Performed by Johnna Wolff GLUCOSE, POC Collection Time: 02/24/21  8:29 PM  
Result Value Ref Range Glucose (POC) 78 65 - 100 mg/dL Performed by Laurence Schmitt GLUCOSE, POC Collection Time: 02/24/21  9:54 PM  
Result Value Ref Range Glucose (POC) 121 (H) 65 - 100 mg/dL Performed by Jefferson Mcdonald CBC WITH AUTOMATED DIFF Collection Time: 02/25/21  5:45 AM  
Result Value Ref Range WBC 15.6 (H) 3.6 - 11.0 K/uL  
 RBC 2.79 (L) 3.80 - 5.20 M/uL HGB 8.0 (L) 11.5 - 16.0 g/dL HCT 27.8 (L) 35.0 - 47.0 %  MCV 99.6 (H) 80.0 - 99.0 FL  
 MCH 28.7 26.0 - 34.0 PG  
 MCHC 28.8 (L) 30.0 - 36.5 g/dL  
 RDW 15.9 (H) 11.5 - 14.5 % PLATELET 339 297 - 042 K/uL MPV 10.1 8.9 - 12.9 FL  
 NRBC 0.0 0  WBC ABSOLUTE NRBC 0.00 0.00 - 0.01 K/uL NEUTROPHILS 80 (H) 32 - 75 % LYMPHOCYTES 7 (L) 12 - 49 % MONOCYTES 8 5 - 13 % EOSINOPHILS 3 0 - 7 % BASOPHILS 1 0 - 1 % IMMATURE GRANULOCYTES 2 (H) 0.0 - 0.5 % ABS. NEUTROPHILS 12.4 (H) 1.8 - 8.0 K/UL  
 ABS. LYMPHOCYTES 1.1 0.8 - 3.5 K/UL  
 ABS. MONOCYTES 1.2 (H) 0.0 - 1.0 K/UL  
 ABS. EOSINOPHILS 0.5 (H) 0.0 - 0.4 K/UL  
 ABS. BASOPHILS 0.1 0.0 - 0.1 K/UL  
 ABS. IMM. GRANS. 0.3 (H) 0.00 - 0.04 K/UL  
 DF AUTOMATED METABOLIC PANEL, COMPREHENSIVE Collection Time: 02/25/21  5:45 AM  
Result Value Ref Range Sodium 139 136 - 145 mmol/L Potassium 4.0 3.5 - 5.1 mmol/L Chloride 103 97 - 108 mmol/L  
 CO2 32 21 - 32 mmol/L Anion gap 4 (L) 5 - 15 mmol/L Glucose 160 (H) 65 - 100 mg/dL BUN 26 (H) 6 - 20 MG/DL Creatinine 1.16 (H) 0.55 - 1.02 MG/DL  
 BUN/Creatinine ratio 22 (H) 12 - 20 GFR est AA 56 (L) >60 ml/min/1.73m2 GFR est non-AA 46 (L) >60 ml/min/1.73m2 Calcium 7.4 (L) 8.5 - 10.1 MG/DL Bilirubin, total 0.7 0.2 - 1.0 MG/DL  
 ALT (SGPT) 24 12 - 78 U/L  
 AST (SGOT) 22 15 - 37 U/L Alk. phosphatase 207 (H) 45 - 117 U/L Protein, total 4.6 (L) 6.4 - 8.2 g/dL Albumin 2.2 (L) 3.5 - 5.0 g/dL Globulin 2.4 2.0 - 4.0 g/dL A-G Ratio 0.9 (L) 1.1 - 2.2 LD Collection Time: 02/25/21  5:45 AM  
Result Value Ref Range  (H) 81 - 246 U/L Medications reviewed Current Facility-Administered Medications Medication Dose Route Frequency  metoprolol tartrate (LOPRESSOR) tablet 25 mg  25 mg Oral Q6H  
 insulin lispro (HUMALOG) injection   SubCUTAneous AC&HS  
 glucose chewable tablet 16 g  4 Tab Oral PRN  
 dextrose (D50W) injection syrg 12.5-25 g  25-50 mL IntraVENous PRN  
  glucagon (GLUCAGEN) injection 1 mg  1 mg IntraMUSCular PRN  pantoprazole (PROTONIX) 40 mg in 0.9% sodium chloride 10 mL injection  40 mg IntraVENous Q12H  
 sodium chloride (NS) flush 5-40 mL  5-40 mL IntraVENous Q8H  
 sodium chloride (NS) flush 5-40 mL  5-40 mL IntraVENous PRN  
 acetaminophen (TYLENOL) tablet 650 mg  650 mg Oral Q6H PRN Or  
 acetaminophen (TYLENOL) suppository 650 mg  650 mg Rectal Q6H PRN  polyethylene glycol (MIRALAX) packet 17 g  17 g Oral DAILY PRN  
 bisacodyL (DULCOLAX) suppository 10 mg  10 mg Rectal DAILY PRN  
 ondansetron (ZOFRAN) injection 4 mg  4 mg IntraVENous Q6H PRN  
 dextrose (D50W) injection syrg 12.5-25 g  25-50 mL IntraVENous PRN  
 glucagon (GLUCAGEN) injection 1 mg  1 mg IntraMUSCular PRN  
 0.9% sodium chloride infusion 250 mL  250 mL IntraVENous PRN  
 amiodarone (CORDARONE) 375 mg in dextrose 5% 250 mL infusion  0.5-1 mg/min IntraVENous TITRATE  apixaban (ELIQUIS) tablet 5 mg  5 mg Oral BID Care Plan discussed with: Patient/Family and Nurse Total time spent with patient: 30 minutes.  
 
Kavin Edouard MD

## 2021-02-25 NOTE — PROGRESS NOTES
Notified MD that pt is not urinating much since requesting labs on urine, will bladder scan and straight cath if needed per MD.  In addition notified of BS and need for Glucose of 25g

## 2021-02-25 NOTE — PROGRESS NOTES
Stacey Velez stopped and started on pill HR, SR 72 at this time, MD visited pt to give progress. Pt complaining of nausea will give zofran.

## 2021-02-25 NOTE — PROGRESS NOTES
Cardiology Progress Note [address], [place], [address] [facility] Phone [phone]; Fax [phone] 
 
 
 
2021 9:13 AM  
 
Admit Date:           2021 Admit Diagnosis:  Symptomatic anemia [D64.9] GIB (gastrointestinal bleeding) [K92.2] Acute blood loss anemia [D62] Dyspnea [R06.00] :          1945 MRN:          [id number] Impression Plan/Recommendation PAF 
GI bleed Anemia EDWARDS Chronic resp failure on 3 L home O2 Copd Chronic HFpEF Deconditioned · Okay to resume eliquis per GI. Hgb better/stable s/p 1 unit of PRBCs. Does not appear to be actively bleeding. · Patient converted to NSR yesterday, tolerating metoprolol 25 mg q6h, continue for today and will switch to 50 mg BID if BP looks okay tomorrow. · Spoke with Dr Rosana Pedersen, he recommends holding on pacer right now as she has converted and doing okay. Will readdress if needed. · D/c amiodarone gtt- resume PO amiodarone but at 400 mg BID x 6 doses then will go back to 200 mg daily (she was already on this PTA) · Will diuresis prn/tolerated Reviewed plan with patient and her daughter Savannah Last (via phone) both agreeable. All questions answered No intake/output data recorded. Last 3 Recorded Weights in this Encounter 21 2200 21 0253 21 0613 Weight: 236 lb (107 kg) 233 lb 11.2 oz (106 kg) 239 lb 14.4 oz (108.8 kg)  190 -  0700 In: 1250.3 [P.O.:415; I.V.:835.3] Out: 850 [Urine:850] SUBJECTIVE Elizabeth Cardoza feels better today Breathing okay No chest pain Current Facility-Administered Medications Medication Dose Route Frequency  metoprolol tartrate (LOPRESSOR) tablet 25 mg  25 mg Oral Q6H  
 insulin lispro (HUMALOG) injection   SubCUTAneous AC&HS  
  glucose chewable tablet 16 g  4 Tab Oral PRN  
 dextrose (D50W) injection syrg 12.5-25 g  25-50 mL IntraVENous PRN  
 glucagon (GLUCAGEN) injection 1 mg  1 mg IntraMUSCular PRN  pantoprazole (PROTONIX) 40 mg in 0.9% sodium chloride 10 mL injection  40 mg IntraVENous Q12H  
 sodium chloride (NS) flush 5-40 mL  5-40 mL IntraVENous Q8H  
 sodium chloride (NS) flush 5-40 mL  5-40 mL IntraVENous PRN  
 acetaminophen (TYLENOL) tablet 650 mg  650 mg Oral Q6H PRN Or  
 acetaminophen (TYLENOL) suppository 650 mg  650 mg Rectal Q6H PRN  polyethylene glycol (MIRALAX) packet 17 g  17 g Oral DAILY PRN  
 bisacodyL (DULCOLAX) suppository 10 mg  10 mg Rectal DAILY PRN  
 ondansetron (ZOFRAN) injection 4 mg  4 mg IntraVENous Q6H PRN  
 dextrose (D50W) injection syrg 12.5-25 g  25-50 mL IntraVENous PRN  
 glucagon (GLUCAGEN) injection 1 mg  1 mg IntraMUSCular PRN  
 0.9% sodium chloride infusion 250 mL  250 mL IntraVENous PRN  
 amiodarone (CORDARONE) 375 mg in dextrose 5% 250 mL infusion  0.5-1 mg/min IntraVENous TITRATE  apixaban (ELIQUIS) tablet 5 mg  5 mg Oral BID OBJECTIVE Intake/Output Summary (Last 24 hours) at 2/25/2021 1918 Last data filed at 2/25/2021 0969 Gross per 24 hour Intake 637.33 ml Output 150 ml Net 487.33 ml Review of Systems - History obtained from the patient AS PER  HPI Telemetry NSR 70s PHYSICAL EXAM  
  
 
Visit Vitals BP (!) 111/47 (BP 1 Location: Left arm, BP Patient Position: At rest) Pulse 71 Temp 98 °F (36.7 °C) Resp 21 Ht 5' 7\" (1.702 m) Wt 239 lb 14.4 oz (108.8 kg) SpO2 100% BMI 37.57 kg/m² Gen: Well-developed, obese, ill appearing,  in no acute distress  alert and oriented x 3 HEENT:  Pink conjunctivae, [place]. No scleral icterus or conjunctival, moist mucous membranes Neck: Supple, CVL Resp: + accessory muscle use, fine crackles yana bases Card: Regular Rate,Rythm,No murmurs, rubs or gallop. No thrills. GI:          soft, non-tender MSK: No cyanosis or clubbing Skin: No rashes or ulcers, ecchymosis Neuro:  Cranial nerves are grossly intact, moving all four extremities, no focal deficit, follows commands appropriately Psych:  Good insight, oriented to person, place and time, alert, Nml Affect LE: +3 pitting BLE edema. Erythema/purplish discoloration  to BLE  
  
 
DATA REVIEW Laboratory and Imaging have been reviewed by me and are notable for Recent Labs  
  02/22/21 
2246 TROIQ <0.05 Recent Labs  
  02/25/21 
0545 02/24/21 
0552 02/23/21 2119  140 140  
K 4.0 3.4* 3.2*  
CO2 32 33* 34* BUN 26* 30* 33* CREA 1.16* 1.12* 1.08* * 129* 122* MG  --   --  2.2 WBC 15.6* 15.7* 14.8* HGB 8.0* 8.4* 8.3* HCT 27.8* 28.5* 27.3*  
 297 288 Shabbir Stinson NP

## 2021-02-25 NOTE — PROGRESS NOTES
2/25/2021 
9:59 AM 
Update via chart review, pt is continuing to require medical management for acute blood loss anemia, acute on chronic systolic CHF, dyspnea, AFib, T2DM 
 
RUR 50 % High Risk Is This a Readmission YES Is this a Bundle  Yes 
  
Transitions of Care Plan: LOS 2  Days 1Readmit for acute blood loss anemia 1. GI, Cardiology, and Family Practice([facility]) following, cardiology holding off on pacer, AV ode ablation 2. Consult for Hospice info session, CM met w/ pt pt declined, stating \" I only didn't want to do that one test\", not interested in hospice at this time3. From [facility], updates sent I [employer] 3. Will need 2 negative COVID tests prior to return to [facility] 4. Pt has a RUR score of 50%/ high risk for readmission 5. Likely [facility] ambulance transport at discharge 6. CM to follow and assist 
ERIC Walton

## 2021-02-26 NOTE — PERIOP NOTES
Spoke with Dr. Gutierrez Baron to discuss telemetry being removed for 8 hours while patient is undergoing capsule study she advise.

## 2021-02-26 NOTE — PROGRESS NOTES
Cardiology Progress Note [address], [place], [address] [facility] Phone [phone]; Fax [phone] 
 
 
 
2021 9:13 AM  
 
Admit Date:           2021 Admit Diagnosis:  Symptomatic anemia [D64.9] GIB (gastrointestinal bleeding) [K92.2] Acute blood loss anemia [D62] Dyspnea [R06.00] :          1945 MRN:          [id number] Impression Plan/Recommendation PAF 
GI bleed Anemia EDWARDS Chronic resp failure on 3 L home O2 Copd Chronic HFpEF Deconditioned · Noted capsule study today · Remains in NSR- continue amiodarone and BB · Spoke with Dr Isabel Warner, he recommends holding on pacer right now as she has converted and doing okay. Will readdress if needed. · Will diuresis prn/tolerated · F/u chest xray Reviewed plan with patient, will see prn over the weekend. Please call if any cardiac issues arise No intake/output data recorded. Last 3 Recorded Weights in this Encounter 21 2200 21 3469 21 2146 Weight: 233 lb 11.2 oz (106 kg) 239 lb 14.4 oz (108.8 kg) 236 lb (107 kg)  190 -  0700 In: 365 [P.O.:365] Out: 300 [Urine:300] SUBJECTIVE April Vickers feels better today Breathing okay No chest pain Current Facility-Administered Medications Medication Dose Route Frequency  [START ON 2021] iron sucrose (VENOFER) 200 mg in 0.9% sodium chloride 100 mL IVPB  200 mg IntraVENous ONCE  
 amiodarone (CORDARONE) tablet 400 mg  400 mg Oral BID  [START ON 2021] amiodarone (CORDARONE) tablet 200 mg  200 mg Oral DAILY  metoprolol tartrate (LOPRESSOR) tablet 25 mg  25 mg Oral Q6H  
 insulin lispro (HUMALOG) injection   SubCUTAneous AC&HS  
 glucose chewable tablet 16 g  4 Tab Oral PRN  
  dextrose (D50W) injection syrg 12.5-25 g  25-50 mL IntraVENous PRN  
 glucagon (GLUCAGEN) injection 1 mg  1 mg IntraMUSCular PRN  pantoprazole (PROTONIX) 40 mg in 0.9% sodium chloride 10 mL injection  40 mg IntraVENous Q12H  
 sodium chloride (NS) flush 5-40 mL  5-40 mL IntraVENous Q8H  
 sodium chloride (NS) flush 5-40 mL  5-40 mL IntraVENous PRN  
 acetaminophen (TYLENOL) tablet 650 mg  650 mg Oral Q6H PRN Or  
 acetaminophen (TYLENOL) suppository 650 mg  650 mg Rectal Q6H PRN  polyethylene glycol (MIRALAX) packet 17 g  17 g Oral DAILY PRN  
 bisacodyL (DULCOLAX) suppository 10 mg  10 mg Rectal DAILY PRN  
 ondansetron (ZOFRAN) injection 4 mg  4 mg IntraVENous Q6H PRN  
 0.9% sodium chloride infusion 250 mL  250 mL IntraVENous PRN  
 apixaban (ELIQUIS) tablet 5 mg  5 mg Oral BID OBJECTIVE Intake/Output Summary (Last 24 hours) at 2/26/2021 3651 Last data filed at 2/26/2021 6109 Gross per 24 hour Intake 50 ml Output 300 ml Net -250 ml Review of Systems - History obtained from the patient AS PER  HPI Telemetry NSR 70s PHYSICAL EXAM  
  
 
Visit Vitals BP (!) 104/48 (BP 1 Location: Right arm, BP Patient Position: At rest) Pulse 70 Temp 98.1 °F (36.7 °C) Resp 20 Ht 5' 7\" (1.702 m) Wt 236 lb (107 kg) SpO2 100% BMI 36.96 kg/m² Gen: Well-developed, obese, ill appearing,  in no acute distress  alert and oriented x 3, resting HEENT:  Pink conjunctivae, [place]. No scleral icterus or conjunctival, moist mucous membranes Neck: Supple, CVL Resp: + accessory muscle use, mouth breathing when sleeping, diminished yana bases Card: Regular Rate,Rythm,No murmurs, rubs or gallop. No thrills. GI:          soft, non-tender MSK: No cyanosis or clubbing Skin: No rashes or ulcers, ecchymosis Neuro:  Cranial nerves are grossly intact, moving all four extremities, no focal deficit, follows commands appropriately Psych:  Good insight, oriented to person, place and time, alert, Nml Affect LE: +3 pitting BLE edema. Erythema/purplish discoloration  to BLE  
  
 
DATA REVIEW Laboratory and Imaging have been reviewed by me and are notable for No results for input(s): CPK, CKMB, TROIQ in the last 72 hours. Recent Labs  
  02/26/21 
0227 02/25/21 
0545 02/24/21 
0552 02/23/21 
2119  139 140 140  
K 3.6 4.0 3.4* 3.2*  
CO2 31 32 33* 34* BUN 22* 26* 30* 33* CREA 1.22* 1.16* 1.12* 1.08* * 160* 129* 122* MG  --   --   --  2.2 WBC 14.6* 15.6* 15.7* 14.8* HGB 7.5* 8.0* 8.4* 8.3* HCT 26.2* 27.8* 28.5* 27.3*  
 339 297 288 Gayle Mercado NP

## 2021-02-26 NOTE — PROGRESS NOTES
Daily Progress Note: 2/26/2021 Keiko Crum MD 
 
Assessment/Plan:  
Acute blood loss anemia due to GIB / Symptomatic anemia:  Admit for further workup and treatment. NPO.  IVF. PPI. Supportive care. transfuse as needed. GI consulted. Mechanical DVT prophylaxis only due to bleeding 
  
Acute on chronic systolic CHF /  Elevated BNP:   
-  Cardiology consulted.  
  
Dyspnea: currently improved to baseline 
  
COPD: Appears to be stable and at baseline with no exacerbation. Continue with baseline oxygen supplementation and increase as needed. Scheduled and as needed bronchodilators. 
  
Atrial fibrillation ([address] (11/16/2018) on Eliquis: Chronic but rate controlled.  
  
DM type 2:  Monitor blood glucose levels with insulin sliding scale coverage. No basal coverage for now. Monitor for complications.  
  
Body mass index is 36.96 kg/m².: 30.0 - 39.9 Obese   
   
 
Problem List: 
Problem List as of 2/26/2021 Date Reviewed: 11/2/2020 Codes Class Noted - Resolved Elevated brain natriuretic peptide (BNP) level ICD-10-CM: R79.89 ICD-9-CM: 790.99  2/23/2021 - Present Acute blood loss anemia ICD-10-CM: D62 
ICD-9-CM: 285.1  2/23/2021 - Present Dyspnea ICD-10-CM: R06.00 
ICD-9-CM: 786.09  2/23/2021 - Present GIB (gastrointestinal bleeding) ICD-10-CM: K92.2 ICD-9-CM: 578.9  2/23/2021 - Present Symptomatic anemia ICD-10-CM: D64.9 ICD-9-CM: 285.9  2/23/2021 - Present Pneumonia ICD-10-CM: J18.9 ICD-9-CM: 258  1/26/2021 - Present SOB (shortness of breath) ICD-10-CM: R06.02 
ICD-9-CM: 786.05  1/26/2021 - Present Anemia ICD-10-CM: D64.9 ICD-9-CM: 285.9  1/26/2021 - Present Acute on chronic systolic CHF (congestive heart failure) (HCC) ICD-10-CM: H86.76 ICD-9-CM: 428.23, 428.0  1/26/2021 - Present Uncontrolled type 2 diabetes mellitus with hyperglycemia (HCC) ICD-10-CM: E11.65 ICD-9-CM: 250.02  1/26/2021 - Present Left lower lobe pneumonia ICD-10-CM: J18.9 ICD-9-CM: 199  1/1/2021 - Present Leukocytosis ICD-10-CM: K38.864 ICD-9-CM: 288.60  11/3/2020 - Present Chronic respiratory failure with hypoxia [facility]) ICD-10-CM: J96.11 
ICD-9-CM: 518.83, 799.02  11/2/2020 - Present Overview Signed 11/2/2020 10:12 PM by Sushila Mascorro MD  
  On 3L NC at home Cellulitis of lower extremity ICD-10-CM: L03.119 ICD-9-CM: 682.6  3/23/2020 - Present ASO (arteriosclerosis obliterans) ICD-10-CM: I70.90 ICD-9-CM: 440.9  9/5/2019 - Present PVD (peripheral vascular disease) ([facility] ICD-10-CM: I73.9 ICD-9-CM: 443.9  8/1/2019 - Present Severe obesity ([facility] ICD-10-CM: E66.01 
ICD-9-CM: 278.01  7/30/2019 - Present COPD (chronic obstructive pulmonary disease) ([facility]) ICD-10-CM: J44.9 ICD-9-CM: 039  7/13/2019 - Present Normocytic anemia ICD-10-CM: D64.9 ICD-9-CM: 285.9  7/13/2019 - Present PAD (peripheral artery disease) ([facility]) ICD-10-CM: I73.9 ICD-9-CM: 443.9  7/13/2019 - Present Mild intermittent asthma ICD-10-CM: J45.20 ICD-9-CM: 493.90  5/17/2019 - Present Brachial artery thrombus (HCC) ICD-10-CM: M92.1 ICD-9-CM: 444.21  4/26/2019 - Present Sleep apnea ICD-10-CM: G47.30 ICD-9-CM: 780.57  1/5/2019 - Present Overview Signed 1/5/2019  8:41 PM by Reno Cast, DO  
  wears CPAP Atrial fibrillation [facility]) ICD-10-CM: I48.91 
ICD-9-CM: 427.31  11/16/2018 - Present Obesity (BMI 30-39.9) (Chronic) ICD-10-CM: W41.8 ICD-9-CM: 278.00  11/13/2018 - Present Recurrent deep vein thrombosis (DVT) (HCC) ICD-10-CM: I82.409 ICD-9-CM: 453.40  3/30/2018 - Present Chronic anticoagulation (Chronic) ICD-10-CM: Z79.01 
ICD-9-CM: V58.61  3/30/2018 - Present Essential hypertension (Chronic) ICD-10-CM: I10 
ICD-9-CM: 401.9  1/22/2016 - Present CAD (coronary artery disease) ICD-10-CM: I25.10 ICD-9-CM: 414.00  10/9/2015 - Present Type II diabetes mellitus (HCC) (Chronic) ICD-10-CM: E11.9 ICD-9-CM: 250.00  10/9/2015 - Present RESOLVED: Syncope and collapse ICD-10-CM: R55 
ICD-9-CM: 780.2  9/29/2020 - 11/2/2020 RESOLVED: Cellulitis ICD-10-CM: L03.90 ICD-9-CM: 682.9  3/23/2020 - 5/29/2020 RESOLVED: Hypokalemia ICD-10-CM: E87.6 ICD-9-CM: 276.8  3/23/2020 - 5/29/2020 RESOLVED: Hyponatremia ICD-10-CM: E87.1 ICD-9-CM: 276.1  3/23/2020 - 5/29/2020 RESOLVED: Diarrhea ICD-10-CM: R19.7 ICD-9-CM: 787.91  3/23/2020 - 5/29/2020 RESOLVED: Syncope and collapse ICD-10-CM: R55 
ICD-9-CM: 780.2  7/13/2019 - 5/29/2020 RESOLVED: UTI (urinary tract infection) ICD-10-CM: N39.0 ICD-9-CM: 599.0  7/13/2019 - 11/2/2020 RESOLVED: Sepsis ([facility] ICD-10-CM: A41.9 ICD-9-CM: 038.9, 995.91  7/13/2019 - 11/3/2020 RESOLVED: Leg wound, left ICD-10-CM: W84.699P ICD-9-CM: 891.0  7/13/2019 - 5/29/2020 RESOLVED: Leg laceration, right, initial encounter ICD-10-CM: K62.329M ICD-9-CM: 894.0  7/13/2019 - 5/29/2020 RESOLVED: Cellulitis of right upper arm ICD-10-CM: L03.113 ICD-9-CM: 682.3  5/17/2019 - 5/29/2020 RESOLVED: Gangrene of finger of right hand ([facility] ICD-10-CM: H93 
ICD-9-CM: 785.4  5/17/2019 - 11/2/2020 RESOLVED: Bowel obstruction ([facility] ICD-10-CM: Y20.674 ICD-9-CM: 560.9  1/8/2019 - 5/29/2020 RESOLVED: Partial small bowel obstruction ([facility] ICD-10-CM: K56.600 ICD-9-CM: 560.9  1/5/2019 - 5/29/2020 RESOLVED: Dyspnea ICD-10-CM: R06.00 
ICD-9-CM: 786.09  11/13/2018 - 5/29/2020 RESOLVED: ARF (acute renal failure) (HCC) ICD-10-CM: N17.9 ICD-9-CM: 584.9  11/13/2018 - 5/29/2020 RESOLVED: Closed wedge compression fracture of T7 vertebra ([facility] ICD-10-CM: A07.628M ICD-9-CM: 805.2  11/13/2018 - 5/29/2020  RESOLVED: Type 2 diabetes with nephropathy (HCC) ICD-10-CM: E11.21 
 ICD-9-CM: 250.40, 583.81  10/1/2018 - 11/2/2020 RESOLVED: Chest pain ICD-10-CM: R07.9 ICD-9-CM: 786.50  6/27/2018 - 5/29/2020 RESOLVED: Abdominal pain ICD-10-CM: R10.9 ICD-9-CM: 789.00  6/27/2018 - 5/29/2020 RESOLVED: Coronary artery disease involving native coronary artery without angina pectoris (Chronic) ICD-10-CM: I25.10 ICD-9-CM: 414.01  1/22/2016 - 11/2/2020 RESOLVED: HTN (hypertension) ICD-10-CM: I10 
ICD-9-CM: 401.9  10/9/2015 - 1/22/2016 RESOLVED: NSTEMI (non-ST elevated myocardial infarction) ([facility] ICD-10-CM: I21.4 ICD-9-CM: 410.70  10/9/2015 - 5/29/2020 Subjective:  
 70 y.o. female with history that includes DM 2, CAD with MI, COPD on 3LNC baseline, and A. fib on Eliquis who was recently admitted to [facility] with anemia, pneumonia, and acute on chronic CHF then transferred to rehab was sent back to the rehab due to anemia and dyspnea. She has been somewhat dyspneic for the past couple of days constant and worsening to the point of being severe even at rest with increased O2 requirements up to 4 LNC associated with orthopnea. She was found to have a hemoglobin of 6.8 at the rehab center. However, here in the ER she was found to have hemoglobin at 7.2 and she was back to her 3LNC. It was reported dark stools which tested FOBT positive. She is on Eliquis as mentioned. (Dr Abigail Baig) 2/23:  Reports she if feeling better this AM with less SOB and O2 back to usual 3 liters. No ab pain. No CP. Sats ok on O2. Nurses report they are unable to get AM labs and pt needs central, PICC or mid line placed. She does not appear in any distress. Dr. Abigail Baig has ordered blood and consented pt. Her chronic GI bleeding may warrant complete DC of anticoagulation - discussed with GI and little else to do. Also consulting Cards for her chronic decompensation of CHF/A fib episodes with mult readmissions - there may be little to do for this either. :  C/O mild nausea this AM and somewhat fatigued but otherwise little change. Rate has been up and down requiring does of IV dig and Amiodarone drip. Cards also consulting EP for poss ablation. Cards and GI agree with restart of Eliquis - risk of CVA > risk of bleeds. :  Still c/o nausea and now having loose stools - 3 overnight. Declines capsule study for GI bleeding. Also, at this time, reports she does not want to have pacer or any other studies/treatments and wants to consider Hospice. Discussed risks v benefits of continued work up and treatment of problems but she reports \"getting tired of having things done to me. \"  She wants to have an information session with Hospice for further consideration. :  No nausea at this moment. Needed Zofran once last night and had one loose stool. CT of ab/pelvis reveals nothing major. Capsule study of small bowel today. She does not need to be off tele for the study as she has hard wired tele and not remote tele. She reports she will do the capsule study and \"if nothing to fix then maybe do that Hospice. \"  Back in sinus rhythm at this moment. 
  
Review of Systems: A comprehensive review of systems was negative except for that written in the HPI. Objective:  
Physical Exam:  
Visit Vitals BP (!) 119/55 Pulse 70 Temp 98.4 °F (36.9 °C) Resp 20 Ht 5' 7\" (1.702 m) Wt 107 kg (236 lb) SpO2 99% BMI 36.96 kg/m² O2 Flow Rate (L/min): 4 l/min O2 Device: Nasal cannula Temp (24hrs), Av.3 °F (36.8 °C), Min:98 °F (36.7 °C), Max:98.6 °F (37 °C) No intake/output data recorded. 1901 -  0700 In: 365 [P.O.:365] Out: 300 [Urine:300] General:  Alert, obese,cooperative, no distress, appears stated age. Head:  Normocephalic, without obvious abnormality, atraumatic. Eyes:  Conjunctivae/corneas clear. EOMs intact. Neck: Supple, symmetrical, trachea midline, no adenopathy, thyroid: no enlargement/tenderness/nodules, no carotid bruit and no JVD. Lungs:   A few basilar rales bilaterally. Chest wall:  No tenderness or deformity. Heart:  Reg, rate in 70s at this moment Abdomen:   Soft, mild generalized tenderness without rebound or guarding. Bowel sounds normal. No masses,  No organomegaly. Extremities: no cyanosis. Trace LE edema on top of usual stasis changes. No calf tenderness or cords. Pulses: 2+ and symmetric all extremities. Skin:  turgor normal. No rashes Neurologic:   Alert and oriented X 3. Fine motor of hands and fingers normal.   equal.  No cogwheeling or rigidity. Gait not tested at this time. Sensation grossly normal to touch. Gross motor of extremities normal.    
 
Data Review: EXAM:  CT ABD PELV W WO CONT 2/25/21 INDICATION: unexplained anemia and abd pain COMPARISON: CT abdomen pelvis 3/23/2020. CONTRAST:  100 mL of Isovue-370. 
 FINDINGS:  
Lower Thorax: 
Lung Bases: Moderate left and small right pleural effusions with overlying 
subsegmental atelectasis in the lower lobes. Bilateral interlobular septal 
thickening with scattered groundglass opacities. Heart: Unchanged cardiomegaly. No pericardial effusion. Coronary arteries stents 
noted. A vascular catheter terminates at the cavoatrial junction. Abdomen/Pelvis: 
Liver:  No focal liver lesions. Biliary system: Gallbladder is surgically absent. No intrahepatic or 
extrahepatic biliary ductal dilatation. Spleen: Normal. 
Pancreas: Normal. 
Kidneys/Ureters/Bladder: Multifocal cortical scarring in the kidneys. No renal 
masses. There is a 3 mm nonobstructing stone in the upper pole the right kidney, 
and a 2 mm nonobstructing stone in the lower pole the right kidney. No 
hydronephrosis or hydroureter. The bladder is normal. 
Adrenals: Normal. 
Stomach/bowel: No evidence of active GI bleeding. Incidental 2.1 cm gastric lipoma noted within the gastric body. No dilation or abnormal wall thickening is 
present. No free intraperitoneal air noted. Normal appendix. Fat-containing 
umbilical hernia, with postsurgical changes noted in the ventral abdominal wall. Reproductive Organs: Status post hysterectomy. No suspicious adnexal masses. Vasculature: Normal caliber arteries. Moderate calcific atherosclerosis of the 
abdominal aorta and iliac vasculature. There are recent postoperative changes in 
the left groin overlying the left femoral vessels, with prominent 
hyperdense/calcific material noted in this region and surgical clips. There is a 
small superficial fluid collection in the left groin measuring 2.1 x 1.4 cm 
(series 6 image 207). Portal vein, SMV, and splenic vein are patent. Nodes: No pathologically enlarged lymph nodes. Fluid: No free fluid. Bones/Soft Tissue: No acute fractures or aggressive osseous lesions are seen. Chronic L3 compression fracture status post kyphoplasty. Chronic T7 compression 
fracture status post kyphoplasty. There is nonspecific subcutaneous edema noted 
within the left lateral abdominal wall. IMPRESSION 1. No evidence of active GI bleeding. No evidence of acute process in the 
abdomen or pelvis. 2. Moderate left and small right pleural effusions with overlying lower lobe 
subsegmental atelectasis. Pulmonary interstitial and alveolar edema. 3. Left inguinal postoperative changes involving the left femoral vessels, with 
a small superficial fluid collection overlying the left femoral vessels 
measuring 2.1 x 1.4 cm. Correlate with history. Consider ultrasound to evaluate 
for pseudoaneurysm and/or hematoma. 4. Nonspecific subcutaneous edema in the left lateral abdominal wall. 
  
Recent Days: 
Recent Labs  
  02/26/21 0227 02/25/21 
0545 02/24/21 
8521 WBC 14.6* 15.6* 15.7* HGB 7.5* 8.0* 8.4* HCT 26.2* 27.8* 28.5*  
 339 297 Recent Labs  
  02/26/21 0227 02/25/21 0442 02/24/21 
7296 02/23/21 
2119  139 140 140  
K 3.6 4.0 3.4* 3.2*  
 103 101 101 CO2 31 32 33* 34* * 160* 129* 122* BUN 22* 26* 30* 33* CREA 1.22* 1.16* 1.12* 1.08* CA 7.8* 7.4* 7.2* 7.3*  
MG  --   --   --  2.2 ALB 2.0* 2.2* 2.3* 2.2* TBILI 0.6 0.7 1.0 1.2* ALT 17 24 26 25 No results for input(s): PH, PCO2, PO2, HCO3, FIO2 in the last 72 hours. 24 Hour Results: 
Recent Results (from the past 24 hour(s)) GLUCOSE, POC Collection Time: 02/25/21  8:07 AM  
Result Value Ref Range Glucose (POC) 145 (H) 65 - 100 mg/dL Performed by Juan Mascorro ([facility]) GLUCOSE, POC Collection Time: 02/25/21 11:23 AM  
Result Value Ref Range Glucose (POC) 116 (H) 65 - 100 mg/dL [facility] ([facility]) GLUCOSE, POC Collection Time: 02/25/21  4:25 PM  
Result Value Ref Range Glucose (POC) 66 65 - 100 mg/dL Performed by Sina Mondragon ([facility]) GLUCOSE, POC Collection Time: 02/25/21  4:27 PM  
Result Value Ref Range Glucose (POC) 56 (L) 65 - 100 mg/dL Performed by Sina Mondragon ([facility]) GLUCOSE, POC Collection Time: 02/25/21  4:52 PM  
Result Value Ref Range Glucose (POC) 53 (L) 65 - 100 mg/dL Performed by [facility] GLUCOSE, POC Collection Time: 02/25/21  5:46 PM  
Result Value Ref Range Glucose (POC) 55 (L) 65 - 100 mg/dL Performed by [facility] GLUCOSE, POC Collection Time: 02/25/21  5:48 PM  
Result Value Ref Range Glucose (POC) 213 (H) 65 - 100 mg/dL Performed by [facility] GLUCOSE, POC Collection Time: 02/25/21  9:21 PM  
Result Value Ref Range Glucose (POC) 127 (H) 65 - 100 mg/dL Performed by Nereyda Barroso URINALYSIS W/MICROSCOPIC Collection Time: 02/26/21  1:35 AM  
Result Value Ref Range Color DARK YELLOW Appearance CLOUDY (A) CLEAR Specific gravity 1.020 1.003 - 1.030    
 pH (UA) 5.5 5.0 - 8.0 Protein 30 (A) NEG mg/dL Glucose Negative NEG mg/dL Ketone Negative NEG mg/dL Blood LARGE (A) NEG Urobilinogen 1.0 0.2 - 1.0 EU/dL Nitrites Positive (A) NEG Leukocyte Esterase SMALL (A) NEG    
 WBC 20-50 0 - 4 /hpf  
 RBC 5-10 0 - 5 /hpf Epithelial cells FEW FEW /lpf Bacteria 4+ (A) NEG /hpf Hyaline cast 0-2 0 - 5 /lpf  
BILIRUBIN, CONFIRM Collection Time: 02/26/21  1:35 AM  
Result Value Ref Range Bilirubin UA, confirm Negative NEG    
CBC WITH AUTOMATED DIFF Collection Time: 02/26/21  2:27 AM  
Result Value Ref Range WBC 14.6 (H) 3.6 - 11.0 K/uL  
 RBC 2.63 (L) 3.80 - 5.20 M/uL HGB 7.5 (L) 11.5 - 16.0 g/dL HCT 26.2 (L) 35.0 - 47.0 % MCV 99.6 (H) 80.0 - 99.0 FL  
 MCH 28.5 26.0 - 34.0 PG  
 MCHC 28.6 (L) 30.0 - 36.5 g/dL  
 RDW 16.5 (H) 11.5 - 14.5 % PLATELET 954 450 - 998 K/uL MPV 10.2 8.9 - 12.9 FL  
 NRBC 0.1 (H) 0  WBC ABSOLUTE NRBC 0.02 (H) 0.00 - 0.01 K/uL NEUTROPHILS 78 (H) 32 - 75 % LYMPHOCYTES 7 (L) 12 - 49 % MONOCYTES 9 5 - 13 % EOSINOPHILS 4 0 - 7 % BASOPHILS 1 0 - 1 % IMMATURE GRANULOCYTES 1 (H) 0.0 - 0.5 % ABS. NEUTROPHILS 11.5 (H) 1.8 - 8.0 K/UL  
 ABS. LYMPHOCYTES 1.0 0.8 - 3.5 K/UL  
 ABS. MONOCYTES 1.3 (H) 0.0 - 1.0 K/UL  
 ABS. EOSINOPHILS 0.6 (H) 0.0 - 0.4 K/UL  
 ABS. BASOPHILS 0.1 0.0 - 0.1 K/UL  
 ABS. IMM. GRANS. 0.1 (H) 0.00 - 0.04 K/UL  
 DF SMEAR SCANNED    
 RBC COMMENTS MACROCYTOSIS 
PRESENT 
    
 RBC COMMENTS MICROCYTOSIS 
PRESENT 
    
 RBC COMMENTS ANISOCYTOSIS 1+ 
    
 RBC COMMENTS POLYCHROMASIA PRESENT 
    
 RBC COMMENTS HYPOCHROMIA PRESENT 
    
METABOLIC PANEL, COMPREHENSIVE Collection Time: 02/26/21  2:27 AM  
Result Value Ref Range Sodium 138 136 - 145 mmol/L Potassium 3.6 3.5 - 5.1 mmol/L Chloride 104 97 - 108 mmol/L  
 CO2 31 21 - 32 mmol/L Anion gap 3 (L) 5 - 15 mmol/L Glucose 129 (H) 65 - 100 mg/dL BUN 22 (H) 6 - 20 MG/DL  Creatinine 1.22 (H) 0.55 - 1.02 MG/DL  
 BUN/Creatinine ratio 18 12 - 20    
 GFR est AA 52 (L) >60 ml/min/1.73m2 GFR est non-AA 43 (L) >60 ml/min/1.73m2 Calcium 7.8 (L) 8.5 - 10.1 MG/DL Bilirubin, total 0.6 0.2 - 1.0 MG/DL  
 ALT (SGPT) 17 12 - 78 U/L  
 AST (SGOT) 16 15 - 37 U/L Alk. phosphatase 185 (H) 45 - 117 U/L Protein, total 4.9 (L) 6.4 - 8.2 g/dL Albumin 2.0 (L) 3.5 - 5.0 g/dL Globulin 2.9 2.0 - 4.0 g/dL A-G Ratio 0.7 (L) 1.1 - 2.2 Medications reviewed Current Facility-Administered Medications Medication Dose Route Frequency  [START ON 2/27/2021] iron sucrose (VENOFER) 200 mg in 0.9% sodium chloride 100 mL IVPB  200 mg IntraVENous ONCE  
 amiodarone (CORDARONE) tablet 400 mg  400 mg Oral BID  [START ON 2/28/2021] amiodarone (CORDARONE) tablet 200 mg  200 mg Oral DAILY  metoprolol tartrate (LOPRESSOR) tablet 25 mg  25 mg Oral Q6H  
 insulin lispro (HUMALOG) injection   SubCUTAneous AC&HS  
 glucose chewable tablet 16 g  4 Tab Oral PRN  
 dextrose (D50W) injection syrg 12.5-25 g  25-50 mL IntraVENous PRN  
 glucagon (GLUCAGEN) injection 1 mg  1 mg IntraMUSCular PRN  pantoprazole (PROTONIX) 40 mg in 0.9% sodium chloride 10 mL injection  40 mg IntraVENous Q12H  
 sodium chloride (NS) flush 5-40 mL  5-40 mL IntraVENous Q8H  
 sodium chloride (NS) flush 5-40 mL  5-40 mL IntraVENous PRN  
 acetaminophen (TYLENOL) tablet 650 mg  650 mg Oral Q6H PRN Or  
 acetaminophen (TYLENOL) suppository 650 mg  650 mg Rectal Q6H PRN  polyethylene glycol (MIRALAX) packet 17 g  17 g Oral DAILY PRN  
 bisacodyL (DULCOLAX) suppository 10 mg  10 mg Rectal DAILY PRN  
 ondansetron (ZOFRAN) injection 4 mg  4 mg IntraVENous Q6H PRN  
 0.9% sodium chloride infusion 250 mL  250 mL IntraVENous PRN  
 apixaban (ELIQUIS) tablet 5 mg  5 mg Oral BID Care Plan discussed with: Patient/Family and Nurse Total time spent with patient: 30 minutes.  
 
Seema Lobo MD

## 2021-02-26 NOTE — PROGRESS NOTES
GI 
 
Small bowel capsule in progress. Will have other recommendations pending that result. GI on call available as needed over weekend

## 2021-02-26 NOTE — PROGRESS NOTES
Bedside and Verbal shift change report given to  RN  (oncoming nurse) by Sherry Romberg RN  (offgoing nurse). Report included the following information SBAR, Kardex and Recent Results. .. This patient was assisted with Intentional Toileting every 2 hours during this shift. Documentation of ambulation and output reflected on Flowsheet. 1930- Bedside and Verbal shift change report given to [address] [place] [address] Street (oncoming nurse) by Joey Farah RN  (offgoing nurse). Report included the following information SBAR, Kardex and Recent Results.

## 2021-02-26 NOTE — PROGRESS NOTES
[facility] at [address], [address] W: [phone]  F: [phone] Reason for Visit:  
Semaj Mora is a 70 y.o. female who is seen for follow up of anemia Interval History: She reports feeling very poorly. No pain, but considerable nausea. No bleeding. Camera pill in process currently. Physical Exam:  
 
Visit Vitals BP (!) 101/57 (BP 1 Location: Right upper arm, BP Patient Position: At rest) Pulse 63 Temp 98.4 °F (36.9 °C) Resp 21 Ht 5' 7\" (1.702 m) Wt 236 lb (107 kg) SpO2 99% BMI 36.96 kg/m² General: no distress, frail and ill appearing Respiratory: normal respiratory effort CV: LE peripheral edema Skin: no rashes; no ecchymoses; no petechiae Psych: alert, oriented, normal mood/affect Results:  
 
Lab Results Component Value Date/Time WBC 14.6 (H) 02/26/2021 02:27 AM  
 HGB 7.5 (L) 02/26/2021 02:27 AM  
 HCT 26.2 (L) 02/26/2021 02:27 AM  
 PLATELET 242 92/90/4269 02:27 AM  
 MCV 99.6 (H) 02/26/2021 02:27 AM  
 ABS. NEUTROPHILS 11.5 (H) 02/26/2021 02:27 AM  
 Hemoglobin (POC) 13.9 10/09/2015 12:27 PM  
 Hematocrit (POC) 26 (L) 08/01/2019 11:41 AM  
 
Lab Results Component Value Date/Time  Sodium 138 02/26/2021 02:27 AM  
 Potassium 3.6 02/26/2021 02:27 AM  
 Chloride 104 02/26/2021 02:27 AM  
 CO2 31 02/26/2021 02:27 AM  
 Glucose 129 (H) 02/26/2021 02:27 AM  
 BUN 22 (H) 02/26/2021 02:27 AM  
 Creatinine 1.22 (H) 02/26/2021 02:27 AM  
 GFR est AA 52 (L) 02/26/2021 02:27 AM  
 GFR est non-AA 43 (L) 02/26/2021 02:27 AM  
 Calcium 7.8 (L) 02/26/2021 02:27 AM  
 Sodium (POC) 139 08/01/2019 11:41 AM  
 Potassium (POC) 5.2 (H) 08/01/2019 11:41 AM  
 Chloride (POC) 109 (H) 08/01/2019 11:41 AM  
 Glucose (POC) 127 (H) 02/25/2021 09:21 PM  
 BUN (POC) 22 (H) 08/01/2019 11:41 AM  
 Creatinine (POC) 1.1 08/01/2019 11:41 AM  
 Calcium, ionized (POC) 1.14 08/01/2019 11:41 AM  
 
 Lab Results Component Value Date/Time Bilirubin, total 0.6 02/26/2021 02:27 AM  
 ALT (SGPT) 17 02/26/2021 02:27 AM  
 Alk. phosphatase 185 (H) 02/26/2021 02:27 AM  
 Protein, total 4.9 (L) 02/26/2021 02:27 AM  
 Albumin 2.0 (L) 02/26/2021 02:27 AM  
 Globulin 2.9 02/26/2021 02:27 AM  
 
Lab Results Component Value Date/Time Reticulocyte count 5.4 (H) 02/24/2021 05:52 AM  
 Iron % saturation 11 (L) 02/25/2021 05:45 AM  
 TIBC 217 (L) 02/25/2021 05:45 AM  
 Ferritin 105 02/25/2021 05:45 AM  
 Vitamin B12 597 02/24/2021 05:52 AM  
 Folate 13.6 02/25/2021 05:45 AM  
 Haptoglobin 65 02/25/2021 05:45 AM  
  (H) 02/25/2021 05:45 AM  
 Sed rate, automated 35 (H) 11/02/2020 04:26 PM  
 C-Reactive protein 5.04 (H) 01/27/2021 01:58 AM  
 TSH 3.40 01/13/2021 11:29 AM  
 Lipase 334 06/27/2018 10:49 AM  
 Hep C virus Ab Interp. NONREACTIVE 05/30/2020 12:31 AM  
 
Lab Results Component Value Date/Time INR 1.1 02/22/2021 10:46 PM  
 aPTT 26.1 02/22/2021 10:46 PM  
 D-dimer 1.77 (H) 01/27/2021 01:58 AM  
 Fibrinogen 425 01/27/2021 01:58 AM  
 
Lab Results Component Value Date/Time  (H) 02/25/2021 05:45 AM  
 
1/1/21 Peripheral smear Normocytic anemia with moderate anisopoikilocytosis, polychromasia, and scattered nucleated red blood cells; no increase in schistocytes identified Leukocytosis comprised predominantly of mature neutrophils with toxic granularity Thrombocytosis Stool blood 1/1/2021: positive Stool blood 2/22/2021: positive 1/4/21 EGD/Colonoscopy/ Dr Kong Quinonez Impression: 
Candida esophagitis, gastritis, duodenitis, colon polyps, diverticulosis. Antral mass likely a benign lipoma 
  
2/23/21 XR CHEST IMPRESSION Right internal jugular central venous catheter appears to be in 
satisfactory position. No evidence of placement related complication. Otherwise 
stable appearance of the chest. 
 
2/24/21 XR 3rd finger rt IMPRESSION Soft tissue swelling. 
 
2/24/21 XR ABD PORT  
 IMPRESSION 1. Presence of a moderate amount of gas within the colon (see discussion above). 2. Interval performance of a vertebroplasty procedure involving L3. 
 
2/25/21 CT A/P 
1. No evidence of active GI bleeding. No evidence of acute process in the 
abdomen or pelvis. 2. Moderate left and small right pleural effusions with overlying lower lobe 
subsegmental atelectasis. Pulmonary interstitial and alveolar edema. 3. Left inguinal postoperative changes involving the left femoral vessels, with 
a small superficial fluid collection overlying the left femoral vessels 
measuring 2.1 x 1.4 cm. Correlate with history. Consider ultrasound to evaluate 
for pseudoaneurysm and/or hematoma. 4. Nonspecific subcutaneous edema in the left lateral abdominal wall. Assessment/Recommendations:  
 
1. Anemia, normocytic S/p 1 unit pRBC This appears to be secondary to recurring episodes of blood loss, based on the elevated retic and the repeated positive stool blood tests. No strong evidence for hemolysis, and other labs fairly unremarkable. While ferritin is >100, this was post-transfusion. With her persistently low iron saturation and concern for bleeding, I have given venofer 200mg IV on 2/25/2021. I will give another dose tomorrow. She has known B12 deficiency, but her level now is normal with therapy. The source of blood loss is not clear. EGD and colonoscopy unrevealing. No bleeding on CTA. She is going for capsule endoscopy today. For now, monitor her HGB and transfuse PRN for HGB <7. 
 
 
2. Abdominal pain, Diarrhea, Nausea Uncertain etiology. CT without a cause. GI following. Camera pill pending. 3. Afib On apixaban. Cardiology and GI have discussed risks/benefits and made the decision to resume therapy. Cardiology considering pacemaker. 4. Goals of care Discussed case with Dr. Aruna Oseguera. Apparently patient is considering hospice, hospice information session is pending. Over the weekend my colleague is covering and available as needed, please call with any questions.  
 
 
Signed By: Ciarra Pappas MD

## 2021-02-26 NOTE — PROGRESS NOTES
Patient swallowed endoscopic capsule without difficulty @9711. Pt / Nurse given written instructions regarding diet, activity, and end of exam time. Nurse advised of diet orders and also about capsule and transponder if blue light stops blinking to advise ENDO.

## 2021-02-26 NOTE — PERIOP NOTES
Dr. Angella Orozco stated patient can come off of telemetry for the capsule study but if she goes into rapid afib she will have symptoms and the study will have be interrupted with telemetry being started again.

## 2021-02-26 NOTE — PERIOP NOTES
Mikebernardadeandre Swift 1945 
[id number] Situation: 
 
Scheduled Procedure: Procedure(s): 
CAPSULE Verbal report received from: Ana Mercer RN 
Preoperative diagnosis: anemia Background: 
 
Procedure: Procedure(s): 
CAPSULE Physician performing procedure; Dr. Julia Garcia SBAR QUESTIONS FLOOR TO ENDO RN 
 
NPO Status/Last PO Intake: 2/26/2021 0001 Pregnancy Test:No If yes, result:  
Is the patient taking Blood Thinners: YES If yes, list: Eliquis and last taken 2/26/2021 1723 Is the patient diabetic:yes If yes, what was the last BS:  129  Time taken? 7342  Anything given? no          
Does the patient have a Pacemaker/Defibrillator in place?: no  
Does the patient need antibiotics before/during/after procedure: no  
 
Does the patient have SCD in place:no Is patient on CONTACT precautions:yes If yes, what kind of CONTACT precautions: MRSA/ESBL Assessment: 
Are the vital signs stable prior to patient coming to ENDO?  yes Is the patient alert/oriented and able to sign consent for the procedures:yes How does the patient's abdomen feel prior to coming to ENDO? Will assess in endo Does the patient have a patient IV in place? yes Recommendation: 
Family or Friend present no Permission to share finding with Family or Friend yes and n/a

## 2021-02-26 NOTE — PROGRESS NOTES
SW attempted visit along with Palliative Medicine NP [facility]. Pt was resting in bed, roused somewhat after greeted several times by NP but stated she was too tired to engage in conversation at this time. Will attempt follow up at later time.

## 2021-02-26 NOTE — PROGRESS NOTES
Capsule data recording device removed 1521. Discharge instructs reviewed with patient. Patient denies any complaints at this time in regards to capsule procedure.

## 2021-02-26 NOTE — PROGRESS NOTES
GI 
 
I note her change of mind pertaining to small bowel capsule study; will order for tomorrow morning

## 2021-02-26 NOTE — CONSULTS
Palliative Medicine Consult Vin: [phone] ([phone]) Patient Name: Ruby Lucero YOB: 1945 Date of Initial Consult: 2/26/2021 Reason for Consult: [address] discussion Requesting Provider: Dr. Cheryl Yousif Primary Care Physician: Lamin Herrera MD 
 
 SUMMARY:  
Ruyb Lucero is a 70 y.o. with a past history of DM, MI, CHF, HTN, COPD on 3 L NC, DVT, A. fib on Eliquis, anemia of unknown etiology, obesity, CAD, PAD, sleep apnea with CPAP and history of bowel resection. Patient presented to the ER from [facility], with CC of shortness of breath x1 day, a hemoglobin of 6.8 and an abnormal CXR. In the ER patient patients BP was soft. She was tachycardic and in A. fib, started on amiodarone drip. She was also requiring slightly more O2 support than baseline, with 4 L nasal cannula. . In ER her hemoglobin was actually 7.2, however she was FOBT positive. Labs also significant for leukocytosis, mildly elevated creatinine and albumin 2.4. CXR significant for cardiomegaly and small bilateral pleural effusions and mild pulmonary edema. Patient transfused 1 unit PRBCs and was admitted on 2/22/2021 f with a diagnosis of GI bleed. Chart reviewed- 
 
Palliative medicine team has not met with  Ms. Vinetta Goodpasture before today, however this is her 6th hospitalization over the past 12 months. 
 
-3/233/31/2020- SIRS due to lower extremity cellulitis 
-5/296/2/2020-sepsis 2/2 UTI, +kleb and Ecoli ESBL. -9/2910/12/2020 for syncope and collapse, likely due to hypotension from anemia. -11/211/6/2020 with sepsis secondary to LLE cellulitis 1/262/9/2021 with anemia, acute on chronic CHF and PNA with associated respiratory failure. - 1/11/15/2021 with left lobe PNA, GI bleed and A. fib with RVR. Course of hospitalization:   Initially had ongoing issues with PAF requiring amiodarone drip and digoxin, was considering possible pacemaker and AV node ablation however on 2/24 she converted to NSR and pacer put on hold. Patient anemic however hemoglobin fairly stable, has not required additional transfusions. On 2/26 she underwent GI PillCam, results pending. 2/26 UA positive UTI. Current medical issues leading to Palliative Medicine involvement include: GOC Discussion PALLIATIVE DIAGNOSES:  
1. Lethargy 2. Weakness, generalized 3. Advance care planning discussion 4. DNR discussion 5. Goals of care discussion PLAN:  
1. Prior to visit I spoke with patient's nurse Juan Lee RN. 2. Attempted to see patient earlier in the day, she was sleeping and very difficult to wake, decided to return later in the day at which time she was more awake, though somewhat difficult to get engage in conversation. 3. After introductions, assess patient's understanding of hospitalization. Patient stated that they still do not know what is \" wrong with her\" and that she hopes \"the test today will help the doctors know how to fix the problem\". 4. Patient stated that she is extremely tired s/p tests she had earlier this morning, denies feeling this tired PTA. 5. Weakness/generalized-patient indicated she was not at her baseline, now needing some assistance with eating/drinking due to fatigue and weakness, as well assist with transfers. Patient was eventually able to hold her cup of tea and drink from it without assist, though she asked for me to place it back on OBT. Recommend PT/OT evaluation as soon as patient stable. 6. Advance care planning discussion-no AMD in the EMR. Patient indicates that she may have an advanced medical directive, but stated that her daughterIsh is her legal NOK/primary health care decision maker. 7. Patient declined to complete AMD at this time 8. DNR discussion-patient continues to have a full CODE STATUS at this time. She declined to discuss. 9. Goals of care discussion- Patient reluctant to discuss but  stated that she is hopeful that the PillCam test will reveal the source of her problems and that the medical team will be able to \"fix it\", however, patient stated that if her problem cannot be fixed, she may consider other options. 10. Initial consult note routed to primary continuity provider and/or primary health care team members 11. Communicated plan of care with: Palliative IDTTracy 192 Team 
 
 GOALS OF CARE / TREATMENT PREFERENCES:  
 
GOALS OF CARE: 
Patient/Health Care Proxy Stated Goals: Cure TREATMENT PREFERENCES:  
Code Status: Full Code Advance Care Planning: 
[x] The [facility] Interdisciplinary Team has updated the ACP Navigator with [address] and Patient Capacity Primary Decision MakerCris Goldberg - Daughter - [phone] Advance Care Planning 1/27/2021 Patient's Healthcare Decision Maker is: Legal Next of Kin Primary Decision Maker Name -  
Confirm Advance Directive Yes, not on file Patient Would Like to Complete Advance Directive - Does the patient have other document types - Medical Interventions: Full interventions Other Instructions: Other: As far as possible, the palliative care team has discussed with patient / health care proxy about goals of care / treatment preferences for patient. HISTORY:  
 
History obtained from: medical records/ nurse/ patient CHIEF COMPLAINT: my throat is sore HPI/SUBJECTIVE: The patient is:  
[x] Verbal and participatory [] Non-participatory due to:  
Patient reported that her throat is sore, described as dry and scratchy, denied pain otherwise. She reported feeling somewhat SOB, stated that she is very tired, asked for help eating. Clinical Pain Assessment (nonverbal scale for severity on nonverbal patients):  
Clinical Pain Assessment Severity: 1 Location: throat Character: sore, dry, \"scratchy\" Duration: 1 day Effect: difficult to talk Factors: unknown Frequency: unknown Activity (Movement): Lying quietly, normal position Duration: for how long has pt been experiencing pain (e.g., 2 days, 1 month, years) Frequency: how often pain is an issue (e.g., several times per day, once every few days, constant) FUNCTIONAL ASSESSMENT:  
 
Palliative Performance Scale (PPS): PPS: 30 
 
 
 PSYCHOSOCIAL/SPIRITUAL SCREENING:  
 
Palliative IDT has assessed this patient for cultural preferences / practices and a referral made as appropriate to needs (Cultural Services, Patient Advocacy, Ethics, etc.) Any spiritual / Druze concerns: 
[] Yes /  [x] No 
 
Caregiver Burnout: 
[] Yes /  [] No /  [x] No Caregiver Present Anticipatory grief assessment:  
[x] Normal  / [] Maladaptive ESAS Anxiety: Anxiety: 0 
 
ESAS Depression: Depression: 0 REVIEW OF SYSTEMS:  
 
Positive and pertinent negative findings in ROS are noted above in HPI. The following systems were [x] reviewed / [] unable to be reviewed as noted in HPI Other findings are noted below. Systems: constitutional, ears/nose/mouth/throat, respiratory, gastrointestinal, genitourinary, musculoskeletal, integumentary, neurologic, psychiatric, endocrine. Positive findings noted below. Modified ESAS Completed by: provider Fatigue: 10 Drowsiness: 8 Depression: 0 Pain: 1 Anxiety: 0 Nausea: 0 Anorexia: 0 Dyspnea: 4 Stool Occurrence(s): 1 PHYSICAL EXAM:  
 
From RN flowsheet: 
Wt Readings from Last 3 Encounters:  
02/26/21 236 lb (107 kg) 02/15/21 232 lb (105.2 kg) 02/08/21 254 lb (115.2 kg) Blood pressure (!) 112/55, pulse 91, temperature 97.8 °F (36.6 °C), resp. rate 19, height 5' 7\" (1.702 m), weight 236 lb (107 kg), SpO2 98 %. Pain Scale 1: Numeric (0 - 10) Pain Intensity 1: 0 Pain Onset 1: acute Pain Location 1: Abdomen Pain Orientation 1: Right Pain Description 1: Aching Pain Intervention(s) 1: Medication (see MAR) Last bowel movement, if known:  
 
Constitutional: Appears stated age, large body habitus, extreme fatigue, weakness, NAD Eyes: pupils equal, anicteric, watery ENMT: no nasal discharge, very dry mucous membranes Cardiovascular: regular rhythm, distal pulses intact Respiratory: mildly labored, symmetric, pursed lip breathing Gastrointestinal: large, soft non-tender, +bowel sounds Musculoskeletal:  no deformity, no tenderness to palpation Skin: warm, dry, edema in upper and lower extremities Neurologic: Drowsy, difficulty keeping eyes open, weak, is able to follow commands, moving all extremities Psychiatric: appropriate  affect, no hallucinations Other: 
 
 
 HISTORY:  
 
Active Problems: 
  Type II diabetes mellitus ([address] (10/9/2015) Essential hypertension (1/22/2016) Atrial fibrillation ([address] (11/16/2018) COPD (chronic obstructive pulmonary disease) ([address] (7/13/2019) Acute on chronic systolic CHF (congestive heart failure) ([address] (1/26/2021) Elevated brain natriuretic peptide (BNP) level (2/23/2021) Acute blood loss anemia (2/23/2021) Dyspnea (2/23/2021) GIB (gastrointestinal bleeding) (2/23/2021) Symptomatic anemia (2/23/2021) Past Medical History:  
Diagnosis Date  Asthma  Atrial fibrillation ([address] 11/16/2018  Autoimmune disease ([address]   
 fibromyalgia  CAD (coronary artery disease) 10/9/2015  Closed wedge compression fracture of T7 vertebra ([address] 11/13/2018  COPD (chronic obstructive pulmonary disease) (HCC)  Diabetes ([address]  DVT (deep vein thrombosis) in pregnancy  GERD (gastroesophageal reflux disease)  Heart failure ([address]  History of vascular access device 09/30/2020 4f midline, 12cm at 1cm out placed in L Cephalic by Rakan Quiroz RN  
 HTN (hypertension)  NSTEMI (non-ST elevated myocardial infarction) [facility] 10/9/2015  Obesity (BMI 30-39.9) 11/13/2018  PAD (peripheral artery disease) ([facility] prior stenting  Sleep apnea   
 wears CPAP  
 Statin intolerance Past Surgical History:  
Procedure Laterality Date  COLONOSCOPY N/A 1/4/2021 COLONOSCOPY performed by Carla Chacon MD at [address] [address] [facility] [address]  HX COLOSTOMY    
 and reversal, post episiotomy repair [address]  HX UROLOGICAL  2009  
 bladder sling  IR KYPHOPLASTY LUMBAR  10/8/2020  [place] ABDOMEN SURGERY PROC UNLISTED    
 hital hernia repair, gall bladder removed  [place] AMPUTATION TRANSMETACARPAL Right 06/12/2019  
 entire ring finger, 2nd & 3rd fingers (transmetacarpal)  [place] BREAST SURGERY PROCEDURE UNLISTED    
 lumpectomy  [place] CARDIAC SURG PROCEDURE UNLIST  10/9/15  
 2 stents Family History Problem Relation Age of Onset  Diabetes Mother  Heart Failure Mother  Kidney Disease Mother  Diabetes Father  Heart Disease Father  Elevated Lipids Father  Heart Failure Father  Heart Disease Brother  Cancer Brother   
     kidney  Diabetes Brother  No Known Problems Brother  No Known Problems Brother History reviewed, no pertinent family history. Social History Tobacco Use  Smoking status: Former Smoker Quit date: 3/30/2017 Years since quitting: 3.9  Smokeless tobacco: Never Used Substance Use Topics  Alcohol use: No  
  Alcohol/week: 0.0 standard drinks Comment: very very rarely Allergies Allergen Reactions  Advair Diskus [Fluticasone Propion-Salmeterol] Rash  Aspartame Other (comments)  Breo Ellipta [Fluticasone Furoate-Vilanterol] Rash  Bumex [Bumetanide] Myalgia  Ciprofloxacin Rash  Statins-Hmg-Coa Reductase Inhibitors Other (comments) Muscle pain Lipitor/crestor/zocor  Sulfa (Sulfonamide Antibiotics) Rash  Tetracycline Other (comments) musclepain  Torsemide Rash and Myalgia  Zetia [Ezetimibe] Myalgia Current Facility-Administered Medications Medication Dose Route Frequency  [START ON 2/27/2021] iron sucrose (VENOFER) 200 mg in 0.9% sodium chloride 100 mL IVPB  200 mg IntraVENous ONCE  nystatin (MYCOSTATIN) 100,000 unit/mL oral suspension 500,000 Units  500,000 Units Oral QID AFTER MEALS  
 amiodarone (CORDARONE) tablet 400 mg  400 mg Oral BID  [START ON 2/28/2021] amiodarone (CORDARONE) tablet 200 mg  200 mg Oral DAILY  metoprolol tartrate (LOPRESSOR) tablet 25 mg  25 mg Oral Q6H  
 insulin lispro (HUMALOG) injection   SubCUTAneous AC&HS  
 glucose chewable tablet 16 g  4 Tab Oral PRN  
 dextrose (D50W) injection syrg 12.5-25 g  25-50 mL IntraVENous PRN  
 glucagon (GLUCAGEN) injection 1 mg  1 mg IntraMUSCular PRN  pantoprazole (PROTONIX) 40 mg in 0.9% sodium chloride 10 mL injection  40 mg IntraVENous Q12H  
 sodium chloride (NS) flush 5-40 mL  5-40 mL IntraVENous Q8H  
 sodium chloride (NS) flush 5-40 mL  5-40 mL IntraVENous PRN  
 acetaminophen (TYLENOL) tablet 650 mg  650 mg Oral Q6H PRN Or  
 acetaminophen (TYLENOL) suppository 650 mg  650 mg Rectal Q6H PRN  polyethylene glycol (MIRALAX) packet 17 g  17 g Oral DAILY PRN  
 bisacodyL (DULCOLAX) suppository 10 mg  10 mg Rectal DAILY PRN  
 ondansetron (ZOFRAN) injection 4 mg  4 mg IntraVENous Q6H PRN  
 0.9% sodium chloride infusion 250 mL  250 mL IntraVENous PRN  
 apixaban (ELIQUIS) tablet 5 mg  5 mg Oral BID  
 
 
 
 LAB AND IMAGING FINDINGS:  
 
Lab Results Component Value Date/Time WBC 14.6 (H) 02/26/2021 02:27 AM  
 HGB 7.5 (L) 02/26/2021 02:27 AM  
 PLATELET 600 55/10/8763 02:27 AM  
 
Lab Results Component Value Date/Time  Sodium 138 02/26/2021 02:27 AM  
 Potassium 3.6 02/26/2021 02:27 AM  
 Chloride 104 02/26/2021 02:27 AM  
 CO2 31 02/26/2021 02:27 AM  
 BUN 22 (H) 02/26/2021 02:27 AM  
 Creatinine 1.22 (H) 02/26/2021 02:27 AM  
 Calcium 7.8 (L) 02/26/2021 02:27 AM  
 Magnesium 2.2 02/23/2021 09:19 PM  
 Phosphorus 2.2 (L) 01/03/2021 01:59 AM  
  
Lab Results Component Value Date/Time Alk. phosphatase 185 (H) 02/26/2021 02:27 AM  
 Protein, total 4.9 (L) 02/26/2021 02:27 AM  
 Albumin 2.0 (L) 02/26/2021 02:27 AM  
 Globulin 2.9 02/26/2021 02:27 AM  
 
Lab Results Component Value Date/Time INR 1.1 02/22/2021 10:46 PM  
 Prothrombin time 11.1 02/22/2021 10:46 PM  
 aPTT 26.1 02/22/2021 10:46 PM  
  
Lab Results Component Value Date/Time Iron 24 (L) 02/25/2021 05:45 AM  
 TIBC 217 (L) 02/25/2021 05:45 AM  
 Iron % saturation 11 (L) 02/25/2021 05:45 AM  
 Ferritin 105 02/25/2021 05:45 AM  
  
No results found for: PH, PCO2, PO2 No components found for: [place] Lab Results Component Value Date/Time CK 18 (L) 05/21/2019 06:13 AM  
 CK - MB 1.1 11/13/2018 03:26 PM  
  
 
 
   
 
Total time: 70 min Counseling / coordination time, spent as noted above: 55 min 
> 50% counseling / coordination?: yes Prolonged service was provided for  []30 min   []75 min in face to face time in the presence of the patient, spent as noted above. Time Start:  
Time End:  
Note: this can only be billed with 74657 (initial) or 82811 (follow up). If multiple start / stop times, list each separately.

## 2021-02-26 NOTE — PROGRESS NOTES
2/26/2021 
12:22 PM 
Update via chart review, pt is continuing to require medical management for acute blood loss anemia, acute on chronic systolic CHF, dyspnea, AFib, T2DM 
  
RUR 50 % High Risk              
Is This a Readmission YES Is this a Bundle  Yes 
  
Transitions of Care Plan: LOS 3 Days 1. GI, Cardiology, and Family Practice([facility]) following, cardiology holding off on pacer, AV node ablation; pt has home O2 3L at baseline, currently on 4L O2 2. Heme/onc has been consulted, s/p transfusion, IV iron 3. GI following Capsule study today 4. From [facility] plan to return when stable, will not accept admissions over weekend 5. Will need 2 negative COVID tests prior to return to [facility], notified MD to order PCR today, SNF will accept rapid test as second 6. Pt has a RUR score of 50%/ high risk for readmission 7. Likely [facility] ambulance transport at discharge 8. CM to follow and assist 
ERIC Lance

## 2021-02-27 NOTE — PROGRESS NOTES
Shift Summary Patient in bed resting quietly. Patient incontinent of stool. Kendal care provided. Cream applied to excoriation of groin. Purwick replaced. Blood cultures sent to lab MD notified of elevated HR and low BP.  
 
1225- New orders for heart rate from cardiology. Cardizem given. 0- Cardiology notified of low BP 86/51 & 85/58. New orders to hold metoprolol, Cardizem, and amio. 1815- Patient converted to NSR>

## 2021-02-27 NOTE — PROGRESS NOTES
Temp 101.1. IS therapy. Temp. 98.8. patient only reaches 300 on IS. Educated on importance of IS use. Patient continues to refuse turning. Educated on importance of turning.

## 2021-02-27 NOTE — PROGRESS NOTES
Cardiology Daily Progress Note Dl Dodd is a 70 y.o. female with past medical history significant for paroxysmal atrial fibrillation, chronic respiratory failure on home oxygen 3 L/min, anemia, GI bleeding, chronic heart failure preserved EF. She is now transitioned to p.o. amiodarone and metoprolol. Until this morning she was in sinus rhythm but at 9:30 AM today she went back into atrial fibrillation with heart rate ranging up to 112 bpm. 
 
On my evaluation today she is quite sleepy. She tells me that she could not sleep during the night and wants to sleep now. Telemetry data personally reviewed. Visit Vitals BP (!) 113/59 Pulse 74 Temp 99 °F (37.2 °C) Resp 20 Ht 5' 7\" (1.702 m) Wt 241 lb 11.2 oz (109.6 kg) SpO2 100% BMI 37.86 kg/m² Intake/Output Summary (Last 24 hours) at 2/27/2021 1159 Last data filed at 2/27/2021 6585 Gross per 24 hour Intake 1893 ml Output 1050 ml Net 843 ml General appearance -quite sleepy today. Appears to have some facial swelling. No acute distress. Mental status - affect appropriate to mood Eyes - sclera anicteric, moist mucous membranes Neck - supple, no significant adenopathy Chest - clear to auscultation, no wheezes, rales or rhonchi Heart - normal rate, regular rhythm, normal S1, S2, no murmurs, rubs, clicks or gallops Abdomen - soft, nontender, nondistended, no masses or organomegaly Extremities - peripheral pulses normal, moderate pedal edema Current Facility-Administered Medications Medication Dose Route Frequency  meropenem (MERREM) 500 mg in sterile water (preservative free) 10 mL IV syringe  0.5 g IntraVENous Q8H  
 dilTIAZem IR (CARDIZEM) tablet 30 mg  30 mg Oral TIDAC  nystatin (MYCOSTATIN) 100,000 unit/mL oral suspension 500,000 Units  500,000 Units Oral QID AFTER MEALS  
 amiodarone (CORDARONE) tablet 400 mg  400 mg Oral BID  
  [START ON 2/28/2021] amiodarone (CORDARONE) tablet 200 mg  200 mg Oral DAILY  metoprolol tartrate (LOPRESSOR) tablet 25 mg  25 mg Oral Q6H  
 insulin lispro (HUMALOG) injection   SubCUTAneous AC&HS  
 glucose chewable tablet 16 g  4 Tab Oral PRN  
 dextrose (D50W) injection syrg 12.5-25 g  25-50 mL IntraVENous PRN  
 glucagon (GLUCAGEN) injection 1 mg  1 mg IntraMUSCular PRN  pantoprazole (PROTONIX) 40 mg in 0.9% sodium chloride 10 mL injection  40 mg IntraVENous Q12H  
 sodium chloride (NS) flush 5-40 mL  5-40 mL IntraVENous Q8H  
 sodium chloride (NS) flush 5-40 mL  5-40 mL IntraVENous PRN  
 acetaminophen (TYLENOL) tablet 650 mg  650 mg Oral Q6H PRN Or  
 acetaminophen (TYLENOL) suppository 650 mg  650 mg Rectal Q6H PRN  polyethylene glycol (MIRALAX) packet 17 g  17 g Oral DAILY PRN  
 bisacodyL (DULCOLAX) suppository 10 mg  10 mg Rectal DAILY PRN  
 ondansetron (ZOFRAN) injection 4 mg  4 mg IntraVENous Q6H PRN  
 0.9% sodium chloride infusion 250 mL  250 mL IntraVENous PRN  
 apixaban (ELIQUIS) tablet 5 mg  5 mg Oral BID Lipids 8/10/18: , HDL 95, HDL 3.1, , VLDL 91 Cardiac Testing/ Procedures: 
  
A. Cardiac Cath/PCI: 6/6/18 At [address] [place] [address] - LAD stent/PTCA of D1 
  
10/9/15 L Main: Medl;Nml 
  
LAD: Prox- Med; 50%; Distal - small; D1 - Small to med - prox 60% 
  
LCflex: Large; Tortuous; MLI; GUILLERMO - med - MLI 
  
RCA: Med; Prox 80%; Distal 95% just before bifurcation into small PDA and PLB 
  
LVEDP: 22 
  
LVEF: 55%/ Mild basal inf hypokinesis 
  
No significant gradient across aortic valve. 
  
PCI: [id number] guide/BMW Pre dil with 2 by 12 balloon BMS 2.25 by 22 deployed at high milena distally BMS 2.5 by 18 deployed at high milena proximal lesion Post dil with 2.5 by 15 
  
  
B. ECHO/MEE: 6/2018 - Left ventricle: Systolic function was normal. Ejection fraction was 
estimated to be 60 %. There were no regional wall motion abnormalities. Doppler parameters were consistent with abnormal left ventricular 
relaxation (grade 1 diastolic dysfunction). Aortic valve: Leaflets exhibited normal cuspal separation and good 
mobility. Not well visualized. 
  
10/11/15 - Left ventricle: Systolic function was vigorous. Ejection fraction was 
estimated in the range of 65 % to 70 %. There were no regional wall motion 
abnormalities. 
  
C. StressNuclear/Stress ECHO/Stress test: 
  
D. Vascular: 
  
E. EP: 
  
F. Miscellaneous: 
  
Cardaic cath at [address] [place] [address] 6/6/18: radial - SAMUEL to ALD and ballon angioplasty to 2nd dx. Dr Vimal Amin. Echo 6/3/18 LVEF 65-70% no RWMA. Mild LVH. Mild calcified annulus Assessment: 
 
- PAF 
GI bleed Anemia EDWARDS Chronic resp failure on 3 L home O2 Copd Chronic HFpEF Deconditioned Plan:  
 
-Back into atrial fibrillation with a rate of 212 bpm.  Continue amiodarone p.o. as well as metoprolol 25 mg p.o. 3 times daily. I will add Cardizem 30 mg p.o. 3 times daily for better rate control. Monitor blood pressure. Creatinine is slowly creeping up. We will give her a dose of Lasix. Hemoglobin is stable at 7.2. 
        
___________________________________________________ Timmy Xiong MD, [facility] [place]

## 2021-02-27 NOTE — PROGRESS NOTES
Daily Progress Note: 2/27/2021 Ila Santiago MD 
 
Assessment/Plan:  
Acute blood loss anemia due to GIB / Symptomatic anemia:   
-  transfuse as needed. GI consulted. -  Capsule study result pending.  
  
Acute on chronic systolic CHF /  Elevated BNP/ A fib with RVR:   
-  Cardiology consulted.  
  
Dyspnea: currently improved to baseline 
  
COPD: Appears to be stable and at baseline with no exacerbation. Continue with baseline oxygen supplementation and increase as needed. Scheduled and as needed bronchodilators. 
  
Atrial fibrillation ([address] (11/16/2018) on Eliquis:  
- per Cardiology 
  
DM type 2:  Monitor blood glucose levels with insulin sliding scale coverage. No basal coverage for now. Monitor for complications.  
  
Body mass index is 36.96 kg/m².: 30.0 - 39.9 Obese   
   
 
Problem List: 
Problem List as of 2/27/2021 Date Reviewed: 11/2/2020 Codes Class Noted - Resolved Elevated brain natriuretic peptide (BNP) level ICD-10-CM: R79.89 ICD-9-CM: 790.99  2/23/2021 - Present Acute blood loss anemia ICD-10-CM: D62 
ICD-9-CM: 285.1  2/23/2021 - Present Dyspnea ICD-10-CM: R06.00 
ICD-9-CM: 786.09  2/23/2021 - Present GIB (gastrointestinal bleeding) ICD-10-CM: K92.2 ICD-9-CM: 578.9  2/23/2021 - Present Symptomatic anemia ICD-10-CM: D64.9 ICD-9-CM: 285.9  2/23/2021 - Present Pneumonia ICD-10-CM: J18.9 ICD-9-CM: 885  1/26/2021 - Present SOB (shortness of breath) ICD-10-CM: R06.02 
ICD-9-CM: 786.05  1/26/2021 - Present Anemia ICD-10-CM: D64.9 ICD-9-CM: 285.9  1/26/2021 - Present Acute on chronic systolic CHF (congestive heart failure) (HCC) ICD-10-CM: C54.90 ICD-9-CM: 428.23, 428.0  1/26/2021 - Present Uncontrolled type 2 diabetes mellitus with hyperglycemia (HCC) ICD-10-CM: E11.65 ICD-9-CM: 250.02  1/26/2021 - Present Left lower lobe pneumonia ICD-10-CM: J18.9 ICD-9-CM: 698  1/1/2021 - Present Leukocytosis ICD-10-CM: N34.269 ICD-9-CM: 288.60  11/3/2020 - Present Chronic respiratory failure with hypoxia [facility]) ICD-10-CM: J96.11 
ICD-9-CM: 518.83, 799.02  11/2/2020 - Present Overview Signed 11/2/2020 10:12 PM by Jamie Guerrier MD  
  On 3L NC at home Cellulitis of lower extremity ICD-10-CM: L03.119 ICD-9-CM: 682.6  3/23/2020 - Present ASO (arteriosclerosis obliterans) ICD-10-CM: I70.90 ICD-9-CM: 440.9  9/5/2019 - Present PVD (peripheral vascular disease) ([facility] ICD-10-CM: I73.9 ICD-9-CM: 443.9  8/1/2019 - Present Severe obesity ([facility] ICD-10-CM: E66.01 
ICD-9-CM: 278.01  7/30/2019 - Present COPD (chronic obstructive pulmonary disease) ([facility]) ICD-10-CM: J44.9 ICD-9-CM: 873  7/13/2019 - Present Normocytic anemia ICD-10-CM: D64.9 ICD-9-CM: 285.9  7/13/2019 - Present PAD (peripheral artery disease) ([facility]) ICD-10-CM: I73.9 ICD-9-CM: 443.9  7/13/2019 - Present Mild intermittent asthma ICD-10-CM: J45.20 ICD-9-CM: 493.90  5/17/2019 - Present Brachial artery thrombus ([facility]) ICD-10-CM: B18.7 ICD-9-CM: 444.21  4/26/2019 - Present Sleep apnea ICD-10-CM: G47.30 ICD-9-CM: 780.57  1/5/2019 - Present Overview Signed 1/5/2019  8:41 PM by Colletta Mourning, DO  
  wears CPAP Atrial fibrillation [facility]) ICD-10-CM: I48.91 
ICD-9-CM: 427.31  11/16/2018 - Present Obesity (BMI 30-39.9) (Chronic) ICD-10-CM: K57.4 ICD-9-CM: 278.00  11/13/2018 - Present Recurrent deep vein thrombosis (DVT) (HCC) ICD-10-CM: I82.409 ICD-9-CM: 453.40  3/30/2018 - Present Chronic anticoagulation (Chronic) ICD-10-CM: Z79.01 
ICD-9-CM: V58.61  3/30/2018 - Present Essential hypertension (Chronic) ICD-10-CM: I10 
ICD-9-CM: 401.9  1/22/2016 - Present CAD (coronary artery disease) ICD-10-CM: I25.10 ICD-9-CM: 414.00  10/9/2015 - Present Type II diabetes mellitus (HCC) (Chronic) ICD-10-CM: E11.9 ICD-9-CM: 250.00  10/9/2015 - Present RESOLVED: Syncope and collapse ICD-10-CM: R55 
ICD-9-CM: 780.2  9/29/2020 - 11/2/2020 RESOLVED: Cellulitis ICD-10-CM: L03.90 ICD-9-CM: 682.9  3/23/2020 - 5/29/2020 RESOLVED: Hypokalemia ICD-10-CM: E87.6 ICD-9-CM: 276.8  3/23/2020 - 5/29/2020 RESOLVED: Hyponatremia ICD-10-CM: E87.1 ICD-9-CM: 276.1  3/23/2020 - 5/29/2020 RESOLVED: Diarrhea ICD-10-CM: R19.7 ICD-9-CM: 787.91  3/23/2020 - 5/29/2020 RESOLVED: Syncope and collapse ICD-10-CM: R55 
ICD-9-CM: 780.2  7/13/2019 - 5/29/2020 RESOLVED: UTI (urinary tract infection) ICD-10-CM: N39.0 ICD-9-CM: 599.0  7/13/2019 - 11/2/2020 RESOLVED: Sepsis ([facility] ICD-10-CM: A41.9 ICD-9-CM: 038.9, 995.91  7/13/2019 - 11/3/2020 RESOLVED: Leg wound, left ICD-10-CM: X73.675F ICD-9-CM: 891.0  7/13/2019 - 5/29/2020 RESOLVED: Leg laceration, right, initial encounter ICD-10-CM: B82.506O ICD-9-CM: 894.0  7/13/2019 - 5/29/2020 RESOLVED: Cellulitis of right upper arm ICD-10-CM: L03.113 ICD-9-CM: 682.3  5/17/2019 - 5/29/2020 RESOLVED: Gangrene of finger of right hand ([facility] ICD-10-CM: O51 
ICD-9-CM: 785.4  5/17/2019 - 11/2/2020 RESOLVED: Bowel obstruction ([facility] ICD-10-CM: X67.910 ICD-9-CM: 560.9  1/8/2019 - 5/29/2020 RESOLVED: Partial small bowel obstruction ([facility] ICD-10-CM: K56.600 ICD-9-CM: 560.9  1/5/2019 - 5/29/2020 RESOLVED: Dyspnea ICD-10-CM: R06.00 
ICD-9-CM: 786.09  11/13/2018 - 5/29/2020 RESOLVED: ARF (acute renal failure) (HCC) ICD-10-CM: N17.9 ICD-9-CM: 584.9  11/13/2018 - 5/29/2020 RESOLVED: Closed wedge compression fracture of T7 vertebra ([facility] ICD-10-CM: H69.416F ICD-9-CM: 805.2  11/13/2018 - 5/29/2020 RESOLVED: Type 2 diabetes with nephropathy ([facility] ICD-10-CM: E11.21 
ICD-9-CM: 250.40, 583.81  10/1/2018 - 11/2/2020 RESOLVED: Chest pain ICD-10-CM: R07.9 ICD-9-CM: 786.50  6/27/2018 - 5/29/2020 RESOLVED: Abdominal pain ICD-10-CM: R10.9 ICD-9-CM: 789.00  6/27/2018 - 5/29/2020 RESOLVED: Coronary artery disease involving native coronary artery without angina pectoris (Chronic) ICD-10-CM: I25.10 ICD-9-CM: 414.01  1/22/2016 - 11/2/2020 RESOLVED: HTN (hypertension) ICD-10-CM: I10 
ICD-9-CM: 401.9  10/9/2015 - 1/22/2016 RESOLVED: NSTEMI (non-ST elevated myocardial infarction) ([facility] ICD-10-CM: I21.4 ICD-9-CM: 410.70  10/9/2015 - 5/29/2020 Subjective:  
 70 y.o. female with history that includes DM 2, CAD with MI, COPD on 3LNC baseline, and A. fib on Eliquis who was recently admitted to [facility] with anemia, pneumonia, and acute on chronic CHF then transferred to rehab was sent back to the rehab due to anemia and dyspnea. She has been somewhat dyspneic for the past couple of days constant and worsening to the point of being severe even at rest with increased O2 requirements up to 4 LNC associated with orthopnea. She was found to have a hemoglobin of 6.8 at the rehab center. However, here in the ER she was found to have hemoglobin at 7.2 and she was back to her 3LNC. It was reported dark stools which tested FOBT positive. She is on Eliquis as mentioned. (Dr Mikey Siddiqi) 2/23:  Reports she if feeling better this AM with less SOB and O2 back to usual 3 liters. No ab pain. No CP. Sats ok on O2. Nurses report they are unable to get AM labs and pt needs central, PICC or mid line placed. She does not appear in any distress. Dr. Mikey Siddiqi has ordered blood and consented pt. Her chronic GI bleeding may warrant complete DC of anticoagulation - discussed with GI and little else to do. Also consulting Cards for her chronic decompensation of CHF/A fib episodes with mult readmissions - there may be little to do for this either. :  C/O mild nausea this AM and somewhat fatigued but otherwise little change. Rate has been up and down requiring does of IV dig and Amiodarone drip. Cards also consulting EP for poss ablation. Cards and GI agree with restart of Eliquis - risk of CVA > risk of bleeds. :  Still c/o nausea and now having loose stools - 3 overnight. Declines capsule study for GI bleeding. Also, at this time, reports she does not want to have pacer or any other studies/treatments and wants to consider Hospice. Discussed risks v benefits of continued work up and treatment of problems but she reports \"getting tired of having things done to me. \"  She wants to have an information session with Hospice for further consideration. :  No nausea at this moment. Needed Zofran once last night and had one loose stool. CT of ab/pelvis reveals nothing major. Capsule study of small bowel today. She does not need to be off tele for the study as she has hard wired tele and not remote tele. She reports she will do the capsule study and \"if nothing to fix then maybe do that Hospice. \"  Back in sinus rhythm at this moment. :  More shortness of breath and back in A fib again - rate 100s - 110s. No nausea at this moment. Tmax 101.1. Tnow 99. Repeat cultures pending. Check port CXR. Lungs sound more congested this AM.  Hb 7.4 this AM.  Will get Card to see again now that she is back in A fib with symptoms.  
  
Review of Systems: A comprehensive review of systems was negative except for that written in the HPI. Objective:  
Physical Exam:  
Visit Vitals BP (!) 113/59 Pulse 74 Temp 99 °F (37.2 °C) Resp 20 Ht 5' 7\" (1.702 m) Wt 109.6 kg (241 lb 11.2 oz) SpO2 100% BMI 37.86 kg/m² O2 Flow Rate (L/min): 4 l/min O2 Device: Nasal cannula Temp (24hrs), Av.7 °F (37.1 °C), Min:97.8 °F (36.6 °C), Max:101.1 °F (38.4 °C) No intake/output data recorded.  1901 -  0700 In: 1971 [P.O.:1360; I.V.:613] Out: 1350 [[id number]] General:  Alert, obese,cooperative, no distress, appears stated age. Head:  Normocephalic, without obvious abnormality, atraumatic. Eyes:  Conjunctivae/corneas clear. EOMs intact. Neck: Supple, symmetrical, trachea midline, no adenopathy, thyroid: no enlargement/tenderness/nodules, no carotid bruit and no JVD. Lungs:   Scattered rhonchi and A few basilar rales bilaterally. Chest wall:  No tenderness or deformity. Heart:  irreg,rate in 100s - 110s at this moment Abdomen:   Soft, mild generalized tenderness without rebound or guarding. Bowel sounds normal. No masses,  No organomegaly. Extremities: no cyanosis. Trace LE edema on top of usual stasis changes. No calf tenderness or cords. Pulses: 2+ and symmetric all extremities. Skin:  turgor normal. No rashes Neurologic:   Alert and oriented X 3. Fine motor of hands and fingers normal.   equal.  No cogwheeling or rigidity. Gait not tested at this time. Sensation grossly normal to touch. Gross motor of extremities normal.    
 
Data Review: EXAM:  CT ABD PELV W WO CONT 2/25/21 INDICATION: unexplained anemia and abd pain COMPARISON: CT abdomen pelvis 3/23/2020. CONTRAST:  100 mL of Isovue-370. 
 FINDINGS:  
Lower Thorax: 
Lung Bases: Moderate left and small right pleural effusions with overlying 
subsegmental atelectasis in the lower lobes. Bilateral interlobular septal 
thickening with scattered groundglass opacities. Heart: Unchanged cardiomegaly. No pericardial effusion. Coronary arteries stents 
noted. A vascular catheter terminates at the cavoatrial junction. Abdomen/Pelvis: 
Liver:  No focal liver lesions. Biliary system: Gallbladder is surgically absent. No intrahepatic or 
extrahepatic biliary ductal dilatation. Spleen: Normal. 
Pancreas: Normal. 
Kidneys/Ureters/Bladder: Multifocal cortical scarring in the kidneys.  No renal 
 masses. There is a 3 mm nonobstructing stone in the upper pole the right kidney, 
and a 2 mm nonobstructing stone in the lower pole the right kidney. No 
hydronephrosis or hydroureter. The bladder is normal. 
Adrenals: Normal. 
Stomach/bowel: No evidence of active GI bleeding. Incidental 2.1 cm gastric 
lipoma noted within the gastric body. No dilation or abnormal wall thickening is 
present. No free intraperitoneal air noted. Normal appendix. Fat-containing 
umbilical hernia, with postsurgical changes noted in the ventral abdominal wall. Reproductive Organs: Status post hysterectomy. No suspicious adnexal masses. Vasculature: Normal caliber arteries. Moderate calcific atherosclerosis of the 
abdominal aorta and iliac vasculature. There are recent postoperative changes in 
the left groin overlying the left femoral vessels, with prominent 
hyperdense/calcific material noted in this region and surgical clips. There is a 
small superficial fluid collection in the left groin measuring 2.1 x 1.4 cm 
(series 6 image 207). Portal vein, SMV, and splenic vein are patent. Nodes: No pathologically enlarged lymph nodes. Fluid: No free fluid. Bones/Soft Tissue: No acute fractures or aggressive osseous lesions are seen. Chronic L3 compression fracture status post kyphoplasty. Chronic T7 compression 
fracture status post kyphoplasty. There is nonspecific subcutaneous edema noted 
within the left lateral abdominal wall. IMPRESSION 1. No evidence of active GI bleeding. No evidence of acute process in the 
abdomen or pelvis. 2. Moderate left and small right pleural effusions with overlying lower lobe 
subsegmental atelectasis. Pulmonary interstitial and alveolar edema. 3. Left inguinal postoperative changes involving the left femoral vessels, with 
a small superficial fluid collection overlying the left femoral vessels 
measuring 2.1 x 1.4 cm. Correlate with history. Consider ultrasound to evaluate for pseudoaneurysm and/or hematoma. 4. Nonspecific subcutaneous edema in the left lateral abdominal wall. 
  
Recent Days: 
Recent Labs  
  02/27/21 0258 02/26/21 0227 02/25/21 
0545 WBC 12.5* 14.6* 15.6* HGB 7.4* 7.5* 8.0*  
HCT 25.3* 26.2* 27.8*  
 311 339 Recent Labs  
  02/27/21 0258 02/26/21 0227 02/25/21 
0545  138 139  
K 3.8 3.6 4.0  
 104 103 CO2 31 31 32 * 129* 160* BUN 20 22* 26* CREA 1.38* 1.22* 1.16* CA 7.8* 7.8* 7.4* ALB 1.9* 2.0* 2.2* TBILI 0.7 0.6 0.7 ALT 17 17 24 No results for input(s): PH, PCO2, PO2, HCO3, FIO2 in the last 72 hours. 24 Hour Results: 
Recent Results (from the past 24 hour(s)) GLUCOSE, POC Collection Time: 02/26/21 10:46 AM  
Result Value Ref Range Glucose (POC) 133 (H) 65 - 100 mg/dL Performed by [facility] (PCT) GLUCOSE, POC Collection Time: 02/26/21  4:28 PM  
Result Value Ref Range Glucose (POC) 110 (H) 65 - 100 mg/dL Performed by [facility] (PCT) GLUCOSE, POC Collection Time: 02/26/21  9:32 PM  
Result Value Ref Range Glucose (POC) 152 (H) 65 - 100 mg/dL Performed by Titus Borden CBC WITH AUTOMATED DIFF Collection Time: 02/27/21  2:58 AM  
Result Value Ref Range WBC 12.5 (H) 3.6 - 11.0 K/uL  
 RBC 2.55 (L) 3.80 - 5.20 M/uL HGB 7.4 (L) 11.5 - 16.0 g/dL HCT 25.3 (L) 35.0 - 47.0 % MCV 99.2 (H) 80.0 - 99.0 FL  
 MCH 29.0 26.0 - 34.0 PG  
 MCHC 29.2 (L) 30.0 - 36.5 g/dL  
 RDW 16.8 (H) 11.5 - 14.5 % PLATELET 893 866 - 922 K/uL MPV 9.9 8.9 - 12.9 FL  
 NRBC 0.0 0  WBC ABSOLUTE NRBC 0.00 0.00 - 0.01 K/uL NEUTROPHILS 76 (H) 32 - 75 % LYMPHOCYTES 8 (L) 12 - 49 % MONOCYTES 10 5 - 13 % EOSINOPHILS 4 0 - 7 % BASOPHILS 1 0 - 1 % IMMATURE GRANULOCYTES 1 (H) 0.0 - 0.5 % ABS. NEUTROPHILS 9.5 (H) 1.8 - 8.0 K/UL  
 ABS. LYMPHOCYTES 1.0 0.8 - 3.5 K/UL  
 ABS. MONOCYTES 1.2 (H) 0.0 - 1.0 K/UL  
 ABS. EOSINOPHILS 0.5 (H) 0.0 - 0.4 K/UL ABS. BASOPHILS 0.1 0.0 - 0.1 K/UL  
 ABS. IMM. GRANS. 0.2 (H) 0.00 - 0.04 K/UL  
 DF AUTOMATED METABOLIC PANEL, COMPREHENSIVE Collection Time: 02/27/21  2:58 AM  
Result Value Ref Range Sodium 137 136 - 145 mmol/L Potassium 3.8 3.5 - 5.1 mmol/L Chloride 102 97 - 108 mmol/L  
 CO2 31 21 - 32 mmol/L Anion gap 4 (L) 5 - 15 mmol/L Glucose 117 (H) 65 - 100 mg/dL BUN 20 6 - 20 MG/DL Creatinine 1.38 (H) 0.55 - 1.02 MG/DL  
 BUN/Creatinine ratio 14 12 - 20 GFR est AA 45 (L) >60 ml/min/1.73m2 GFR est non-AA 37 (L) >60 ml/min/1.73m2 Calcium 7.8 (L) 8.5 - 10.1 MG/DL Bilirubin, total 0.7 0.2 - 1.0 MG/DL  
 ALT (SGPT) 17 12 - 78 U/L  
 AST (SGOT) 13 (L) 15 - 37 U/L Alk. phosphatase 186 (H) 45 - 117 U/L Protein, total 4.9 (L) 6.4 - 8.2 g/dL Albumin 1.9 (L) 3.5 - 5.0 g/dL Globulin 3.0 2.0 - 4.0 g/dL A-G Ratio 0.6 (L) 1.1 - 2.2 GLUCOSE, POC Collection Time: 02/27/21  8:05 AM  
Result Value Ref Range Glucose (POC) 116 (H) 65 - 100 mg/dL Performed by Chapis Gallegos (PCT) Medications reviewed Current Facility-Administered Medications Medication Dose Route Frequency  iron sucrose (VENOFER) 200 mg in 0.9% sodium chloride 100 mL IVPB  200 mg IntraVENous ONCE  nystatin (MYCOSTATIN) 100,000 unit/mL oral suspension 500,000 Units  500,000 Units Oral QID AFTER MEALS  
 amiodarone (CORDARONE) tablet 400 mg  400 mg Oral BID  [START ON 2/28/2021] amiodarone (CORDARONE) tablet 200 mg  200 mg Oral DAILY  metoprolol tartrate (LOPRESSOR) tablet 25 mg  25 mg Oral Q6H  
 insulin lispro (HUMALOG) injection   SubCUTAneous AC&HS  
 glucose chewable tablet 16 g  4 Tab Oral PRN  
 dextrose (D50W) injection syrg 12.5-25 g  25-50 mL IntraVENous PRN  
 glucagon (GLUCAGEN) injection 1 mg  1 mg IntraMUSCular PRN  pantoprazole (PROTONIX) 40 mg in 0.9% sodium chloride 10 mL injection  40 mg IntraVENous Q12H  sodium chloride (NS) flush 5-40 mL  5-40 mL IntraVENous Q8H  
 sodium chloride (NS) flush 5-40 mL  5-40 mL IntraVENous PRN  
 acetaminophen (TYLENOL) tablet 650 mg  650 mg Oral Q6H PRN Or  
 acetaminophen (TYLENOL) suppository 650 mg  650 mg Rectal Q6H PRN  polyethylene glycol (MIRALAX) packet 17 g  17 g Oral DAILY PRN  
 bisacodyL (DULCOLAX) suppository 10 mg  10 mg Rectal DAILY PRN  
 ondansetron (ZOFRAN) injection 4 mg  4 mg IntraVENous Q6H PRN  
 0.9% sodium chloride infusion 250 mL  250 mL IntraVENous PRN  
 apixaban (ELIQUIS) tablet 5 mg  5 mg Oral BID Care Plan discussed with: Patient/Family and Nurse Total time spent with patient: 30 minutes.  
 
Cesar Brito MD

## 2021-02-28 NOTE — PROGRESS NOTES
2230-RN paged cardiology and spoke with Dr. Millicent Crump regarding low bp's. Per cardiology, pt. Pt. To be transferred to step-down and started on Roland gtt. Bedside and Verbal shift change report given to Azra Wilson (oncoming nurse) by Edith Brewer (offgoing nurse). Report included the following information SBAR, Kardex and Cardiac Rhythm Sinus Rhythm.

## 2021-02-28 NOTE — CONSULTS
IP CONSULT TO PULMONOLOGY Consult performed by: Joyce Dejesus DO Consult ordered by: Ashley Delarosa MD 
 
 
CC: \"I have wounds\" HPI: 70year old female with moderately severe COPD, CKD, afib on eliquis, HAYES who presented last week with anemia and dyspnea. Workup is ongoing into the etiology of her anemia. She is now requiring jimenez, however, and pulmonary has been asked to evaluate for possible ICU needs. Patient is alert and complaining about her wounds. States her breathing feels fine. Isn't very forthcoming with any history, however. She is followed in our office but hasn't been seen in nearly a year and is often quite sporadic with follow up. She is maintained on dulera, spiriva, PRN albuterol, singulair and O2. Also states she is compliant with CPAP at night but hasn't followed in sleep in a long time. PMH: Moderately severe COPD (FEV1 51% pred), chronic hypoxemic respiratory failure on 2-3L home O2 DM, CAD, GERD, DVT, HTN, HAYES on CPAP, afib in eliquis, HFpEF, obesity, appendectomy, hysterectomy, anemia SH: States she quit smoking \"a couple of years ago\" FH: DM, CAD 
 
ROS: 12 point review of systems attempted but unable to be fully completed as she kept falling asleep PE:  
Vitals reviewed BP ok on 55 mcg jimenez. HR stable, O2 sat 100% on 4L NC Gen: Chronically ill appearing, fatigued, no distress HEENT: NCAT Mouth: Moist oral mucosa CV: Irregular Lungs: CTA B/L 
GI: Soft, non tender Ext: Moves all, lymphadema, chronic stasis changes Neuro: Alert, answers questions appropriately but drowsy/not participating Lab Results Component Value Date/Time WBC 13.4 (H) 02/28/2021 05:58 AM  
 Hemoglobin (POC) 13.9 10/09/2015 12:27 PM  
 HGB 7.3 (L) 02/28/2021 05:58 AM  
 Hematocrit (POC) 26 (L) 08/01/2019 11:41 AM  
 HCT 25.6 (L) 02/28/2021 05:58 AM  
 PLATELET 203 71/50/1705 05:58 AM  
 MCV 99.2 (H) 02/28/2021 05:58 AM  
 
Lab Results Component Value Date/Time Sodium 137 02/28/2021 05:58 AM  
 Potassium 3.7 02/28/2021 05:58 AM  
 Chloride 102 02/28/2021 05:58 AM  
 CO2 28 02/28/2021 05:58 AM  
 Anion gap 7 02/28/2021 05:58 AM  
 Glucose 98 02/28/2021 05:58 AM  
 BUN 19 02/28/2021 05:58 AM  
 Creatinine 1.38 (H) 02/28/2021 05:58 AM  
 BUN/Creatinine ratio 14 02/28/2021 05:58 AM  
 GFR est AA 45 (L) 02/28/2021 05:58 AM  
 GFR est non-AA 37 (L) 02/28/2021 05:58 AM  
 Calcium 8.0 (L) 02/28/2021 05:58 AM  
 
CXR Results  (Last 48 hours) 02/27/21 1220  XR CHEST PORT Final result Impression:  Cardiomegaly. Mild pulmonary edema and possible small left pleural  
effusion without significant change Narrative:  EXAM: XR CHEST PORT INDICATION: follow up COMPARISON: 2/26/2021 FINDINGS: A portable AP radiograph of the chest was obtained at 12:15 hours. Patient's on cardiac monitor. Right IJ central venous catheter is noted with the  
tip in the right atrium. . Mild pulmonary edema. Possible small left pleural  
effusion. Zhou Confer Heart size enlarged. .  The bones and soft tissues are grossly within  
normal limits. 02/26/21 1455  XR CHEST PORT Final result Impression:  1. Increasing pulmonary edema 2. Persistent left pleural effusion and left lower lobe atelectasis. Narrative:  EXAM: XR CHEST PORT INDICATION: Heart Failure COMPARISON: 2/23/2021 FINDINGS: A portable AP radiograph of the chest was obtained at 1447 hours. The  
patient is on a cardiac monitor. Right IJ catheter overlies right atrium. No  
change mild cardiomegaly. Left pleural effusion left lower lobe atelectasis  
persists. There is mild to moderate pulmonary edema which has increased. Impression: 
-hypotension, multifactorial with sepsis from UTI, cardiac disease, anemia. On low dose jimenez 
-moderately severe COPD not in acute exacerbation 
-chronic hypoxic respiratory failure 
-afib with RVR, HFpEF with volume overload 
-anemia Plan: 
-continue jimenez to goal MAP 65/. If gets to 100 mcg or greater will need ICU transfer 
-cultures pending 
-continue antibiotics 
-if BP drops lower can consider adding SDS; blood sugars will need to be monitored closely 
-hold antihypertensives/diuretics--will hold metoprolol 
-anemia workup per GI. Still on eliquis. .. 
-cardiology following. May need amio increased or changed to IV as BB being held 
-resume home inhaled medications 
-O2 weaned back to home 3L 
-palliative care following, patient reportedly open to hospice discussions. Tough situation with multi organ dysfunction making care difficut

## 2021-02-28 NOTE — PROGRESS NOTES
Cardiology Daily Progress Note Angelica Guillen is a 70 y.o. female with past medical history significant for paroxysmal atrial fibrillation, chronic respiratory failure on home oxygen 3 L/min, anemia, GI bleeding, chronic heart failure preserved EF. She is now transitioned to p.o. amiodarone and metoprolol. Until this morning she was in sinus rhythm but at 9:30 AM today she went back into atrial fibrillation with heart rate ranging up to 112 bpm. 
 
On my evaluation today she is quite sleepy and appears tired with significant facial swelling. Telemetry data personally reviewed. Visit Vitals BP (!) 101/46 Pulse 70 Temp 98.4 °F (36.9 °C) Resp 22 Ht 5' 7\" (1.702 m) Wt 241 lb 11.2 oz (109.6 kg) SpO2 98% BMI 37.86 kg/m² Intake/Output Summary (Last 24 hours) at 2/28/2021 1420 Last data filed at 2/28/2021 0900 Gross per 24 hour Intake 130 ml Output 500 ml Net -370 ml General appearance -quite sleepy today. Appears to have some facial swelling. No acute distress. Mental status - affect appropriate to mood Eyes - sclera anicteric, moist mucous membranes Neck - supple, no significant adenopathy Chest - clear to auscultation, no wheezes, rales or rhonchi Heart - normal rate, regular rhythm, normal S1, S2, no murmurs, rubs, clicks or gallops Abdomen - soft, nontender, nondistended, no masses or organomegaly Extremities - peripheral pulses normal, moderate pedal edema Current Facility-Administered Medications Medication Dose Route Frequency  0.9% sodium chloride infusion 250 mL  250 mL IntraVENous PRN  
 arformoteroL (BROVANA) neb solution 15 mcg  15 mcg Nebulization BID RT  
 budesonide (PULMICORT) 500 mcg/2 ml nebulizer suspension  500 mcg Nebulization BID RT  
 montelukast (SINGULAIR) tablet 10 mg  10 mg Oral QHS  meropenem (MERREM) 500 mg in sterile water (preservative free) 10 mL IV syringe  0.5 g IntraVENous Q8H  
  dilTIAZem IR (CARDIZEM) tablet 30 mg  30 mg Oral TIDAC  PHENYLephrine (IVÁN-SYNEPHRINE) 30 mg in 0.9% sodium chloride 250 mL infusion   mcg/min IntraVENous TITRATE  nystatin (MYCOSTATIN) 100,000 unit/mL oral suspension 500,000 Units  500,000 Units Oral QID AFTER MEALS  
 amiodarone (CORDARONE) tablet 200 mg  200 mg Oral DAILY  [Held by provider] metoprolol tartrate (LOPRESSOR) tablet 25 mg  25 mg Oral Q6H  
 insulin lispro (HUMALOG) injection   SubCUTAneous AC&HS  
 glucose chewable tablet 16 g  4 Tab Oral PRN  
 dextrose (D50W) injection syrg 12.5-25 g  25-50 mL IntraVENous PRN  
 glucagon (GLUCAGEN) injection 1 mg  1 mg IntraMUSCular PRN  pantoprazole (PROTONIX) 40 mg in 0.9% sodium chloride 10 mL injection  40 mg IntraVENous Q12H  
 sodium chloride (NS) flush 5-40 mL  5-40 mL IntraVENous Q8H  
 sodium chloride (NS) flush 5-40 mL  5-40 mL IntraVENous PRN  
 acetaminophen (TYLENOL) tablet 650 mg  650 mg Oral Q6H PRN Or  
 acetaminophen (TYLENOL) suppository 650 mg  650 mg Rectal Q6H PRN  polyethylene glycol (MIRALAX) packet 17 g  17 g Oral DAILY PRN  
 bisacodyL (DULCOLAX) suppository 10 mg  10 mg Rectal DAILY PRN  
 ondansetron (ZOFRAN) injection 4 mg  4 mg IntraVENous Q6H PRN  
 0.9% sodium chloride infusion 250 mL  250 mL IntraVENous PRN  
 apixaban (ELIQUIS) tablet 5 mg  5 mg Oral BID Lipids 8/10/18: , HDL 95, HDL 3.1, , VLDL 91 Cardiac Testing/ Procedures: 
  
A. Cardiac Cath/PCI: 6/6/18 At [facility] - LAD stent/PTCA of D1 
  
10/9/15 L Main: Medl;Nml 
  
LAD: Prox- Med; 50%; Distal - small; D1 - Small to med - prox 60% 
  
LCflex: Large; Tortuous; MLI; GUILLERMO - med - MLI 
  
RCA: Med; Prox 80%; Distal 95% just before bifurcation into small PDA and PLB 
  
LVEDP: 22 
  
LVEF: 55%/ Mild basal inf hypokinesis 
  
No significant gradient across aortic valve. 
  
PCI: JR4 guide/BMW Pre dil with 2 by 12 balloon BMS 2.25 by 22 deployed at high [place] distally BMS 2.5 by 18 deployed at high [place] proximal lesion Post dil with 2.5 by 15 
  
  
B. ECHO/MEE: 6/2018 - Left ventricle: Systolic function was normal. Ejection fraction was 
estimated to be 60 %. There were no regional wall motion abnormalities. Doppler parameters were consistent with abnormal left ventricular 
relaxation (grade 1 diastolic dysfunction). Aortic valve: Leaflets exhibited normal cuspal separation and good 
mobility. Not well visualized. 
  
10/11/15 - Left ventricle: Systolic function was vigorous. Ejection fraction was 
estimated in the range of 65 % to 70 %. There were no regional wall motion 
abnormalities. 
  
C. StressNuclear/Stress ECHO/Stress test: 
  
D. Vascular: 
  
E. EP: 
  
F. Miscellaneous: 
  
Cardaic cath at [address] [place] [address] 6/6/18: radial - SAMUEL to ALD and ballon angioplasty to 2nd dx. Dr Bell Gordon. Echo 6/3/18 LVEF 65-70% no RWMA. Mild LVH. Mild calcified annulus Assessment: 
 
PAF Urosepsis Hypotension GI bleed Anemia EDWARDS Chronic resp failure on 3 L home O2 Copd Chronic HFpEF Deconditioned Plan:  
 
-Back into atrial fibrillation but rate controlled. Continue amiodarone p.o. but holding Cardizem and Lopressor due to low BP. Continue Eliquis - Now on Phenylephrine for hypotension likely from severe Urosepsis. - Creatinine is stable. Use lasix when possible from BP standpoint - Hemoglobin is stable at 7.2. Getting blood transfusion today.  
        
___________________________________________________ Timmy Crump MD, [facility]

## 2021-02-28 NOTE — PROGRESS NOTES
2230: Bedside and Verbal shift change report given to [facility] (oncoming nurse) by Florina Correa (offgoing nurse). Report included the following information SBAR, Kardex, Intake/Output, MAR and Recent Results. 2247: Roland started at 30mcg/min. BP 90/39. Pt asymptomatic. Per MD, titrate roland until SBP >100. Central line Type: Triple Lumen Central Line Insert Date: 2/23/2021 Reason Central Line Placed: limited access Central Line Dressing Date: 2/23/20[address] Bi[address]? Yes No: yes Tubing labeled and appropriate? Yes No: yes Alcohol caps on all open ports? Yes No: yes Last CHG bath (time&date): 2/27/2021 2130 Reviewed with provider and central line must stay in for the following reasons: 
 
0000: Called Dr. Ashley Ward about new parameters for pt BP. Systolic has been above 002 but diastolic mostly in the 08R keeping her MAP low. Per MD, keep MAP above 60. 
 
0600: /47, MAP of 70. Roland titrated down to 50mcg/min. 
 
0715: Bedside and Verbal shift change report given to Jim Espino (oncoming nurse) by [facility] (offgoing nurse). Report included the following information SBAR, Kardex, Intake/Output, MAR and Recent Results.

## 2021-02-28 NOTE — PROGRESS NOTES
Spiritual Care Assessment/Progress Note [address] 
 
 
NAME: Nick Betancur      MRN: [id number] AGE: 70 y.o. SEX: female Religion Affiliation: Mormon Language: English  
 
2/28/2021     Total Time (in minutes): 8 Spiritual Assessment begun in [facility] 3 PRO CARE TELE 2 through conversation with: 
  
    []Patient        [] Family    [] Friend(s) Reason for Consult: Palliative Care, Initial/Spiritual Assessment Spiritual beliefs Per previous notes: (Please include comment if needed) [x] Identifies with a leanna tradition:  Christian   
   [] Supported by a leanna community:        
   [] Claims no spiritual orientation:       
   [] Seeking spiritual identity:            
   [] Adheres to an individual form of spirituality:       
   [] Not able to assess:                   
 
    
Identified resources for coping:  
   [] Prayer                           
   [] Music                  [] Guided Imagery 
   [] Family/friends                 [] Pet visits [] Devotional reading                         [x] Unknown 
   [] Other:                                          
 
 
Interventions offered during this visit: (See comments for more details) Patient Interventions: Prayer (actual), Initial visit(Silent prayer attempted visit) Plan of Care: 
 
 [] Support spiritual and/or cultural needs  
 [] Support AMD and/or advance care planning process    
 [] Support grieving process 
 [] Coordinate Rites and/or Rituals  
 [] Coordination with community clergy [] No spiritual needs identified at this time 
 [] Detailed Plan of Care below (See Comments)  [] Make referral to Music Therapy 
[] Make referral to Pet Therapy    
[] Make referral to Addiction services 
[] Make referral to [facility] 
[] Make referral to Spiritual Care Partner 
[] No future visits requested       
[x] Follow up upon further referrals Comments: Initial spiritual assessment in [facility]. Miss Sukhjidner Michelle appeared to be sleeping, she did not respond when I spoke to her. Carlos Alberto Hall there from previous visit, she indicated then she has a Religion belief in [address]. Provided silent prayer at bedside. Visited by: Mily Holder MS., [address] ([id number])

## 2021-02-28 NOTE — PROGRESS NOTES
Bedside and Verbal shift change report given to Martina Ayala (oncoming nurse) by Stanley Dixon (offgoing nurse). Report included the following information SBAR, Kardex, Intake/Output, MAR, Recent Results and Cardiac Rhythm NSR.  
 
1056 paged cardiology regarding whether to give PO amio 200mg and PO dilt IR 30mg. Pt appears to be in A.fib again [address] Per Dr. Sorto Mandes to hold PO dilt IR but ok to give amio PO at this time 1430 blood transfusion started [phone] blood transfusion ended Bedside and Verbal shift change report given to Linda Bae (oncoming nurse) by Martina Ayala (offgoing nurse). Report included the following information SBAR, Kardex, Intake/Output, MAR and Recent Results.

## 2021-02-28 NOTE — PROGRESS NOTES
Daily Progress Note: 2/28/2021 Pernell Ruiz MD 
 
Assessment/Plan:  
Acute blood loss anemia due to GIB / Symptomatic anemia:   
-  transfuse as needed. GI consulted. -  Capsule study result pending.  
  
Acute on chronic systolic CHF /  Elevated BNP/ A fib with RVR:   
-  Cardiology consulted.  
  
Dyspnea: currently improved to baseline 
  
COPD: Appears to be stable and at baseline with no exacerbation. Continue with baseline oxygen supplementation and increase as needed. Scheduled and as needed bronchodilators. 
  
Atrial fibrillation ([address] (11/16/2018) on Eliquis:  
- per Cardiology 
  
DM type 2:  Monitor blood glucose levels with insulin sliding scale coverage. No basal coverage for now. Monitor for complications. UTI 
- gram neg rods >100K - covering with Merrem 
  
Body mass index is 36.96 kg/m².: 30.0 - 39.9 Obese   
   
 
Problem List: 
Problem List as of 2/28/2021 Date Reviewed: 11/2/2020 Codes Class Noted - Resolved Elevated brain natriuretic peptide (BNP) level ICD-10-CM: R79.89 ICD-9-CM: 790.99  2/23/2021 - Present Acute blood loss anemia ICD-10-CM: D62 
ICD-9-CM: 285.1  2/23/2021 - Present Dyspnea ICD-10-CM: R06.00 
ICD-9-CM: 786.09  2/23/2021 - Present GIB (gastrointestinal bleeding) ICD-10-CM: K92.2 ICD-9-CM: 578.9  2/23/2021 - Present Symptomatic anemia ICD-10-CM: D64.9 ICD-9-CM: 285.9  2/23/2021 - Present Pneumonia ICD-10-CM: J18.9 ICD-9-CM: 555  1/26/2021 - Present SOB (shortness of breath) ICD-10-CM: R06.02 
ICD-9-CM: 786.05  1/26/2021 - Present Anemia ICD-10-CM: D64.9 ICD-9-CM: 285.9  1/26/2021 - Present Acute on chronic systolic CHF (congestive heart failure) (HCC) ICD-10-CM: E27.13 ICD-9-CM: 428.23, 428.0  1/26/2021 - Present Uncontrolled type 2 diabetes mellitus with hyperglycemia (HCC) ICD-10-CM: E11.65 ICD-9-CM: 250.02  1/26/2021 - Present Left lower lobe pneumonia ICD-10-CM: J18.9 ICD-9-CM: 161  1/1/2021 - Present Leukocytosis ICD-10-CM: R11.266 ICD-9-CM: 288.60  11/3/2020 - Present Chronic respiratory failure with hypoxia [facility]) ICD-10-CM: J96.11 
ICD-9-CM: 518.83, 799.02  11/2/2020 - Present Overview Signed 11/2/2020 10:12 PM by Jamie Guerrier MD  
  On 3L NC at home Cellulitis of lower extremity ICD-10-CM: L03.119 ICD-9-CM: 682.6  3/23/2020 - Present ASO (arteriosclerosis obliterans) ICD-10-CM: I70.90 ICD-9-CM: 440.9  9/5/2019 - Present PVD (peripheral vascular disease) ([facility] ICD-10-CM: I73.9 ICD-9-CM: 443.9  8/1/2019 - Present Severe obesity ([facility] ICD-10-CM: E66.01 
ICD-9-CM: 278.01  7/30/2019 - Present COPD (chronic obstructive pulmonary disease) ([facility]) ICD-10-CM: J44.9 ICD-9-CM: 724  7/13/2019 - Present Normocytic anemia ICD-10-CM: D64.9 ICD-9-CM: 285.9  7/13/2019 - Present PAD (peripheral artery disease) (HCC) ICD-10-CM: I73.9 ICD-9-CM: 443.9  7/13/2019 - Present Mild intermittent asthma ICD-10-CM: J45.20 ICD-9-CM: 493.90  5/17/2019 - Present Brachial artery thrombus (HCC) ICD-10-CM: I16.7 ICD-9-CM: 444.21  4/26/2019 - Present Sleep apnea ICD-10-CM: G47.30 ICD-9-CM: 780.57  1/5/2019 - Present Overview Signed 1/5/2019  8:41 PM by Colletta Mourning, DO  
  wears CPAP Atrial fibrillation [facility]) ICD-10-CM: I48.91 
ICD-9-CM: 427.31  11/16/2018 - Present Obesity (BMI 30-39.9) (Chronic) ICD-10-CM: O43.8 ICD-9-CM: 278.00  11/13/2018 - Present Recurrent deep vein thrombosis (DVT) (HCC) ICD-10-CM: I82.409 ICD-9-CM: 453.40  3/30/2018 - Present Chronic anticoagulation (Chronic) ICD-10-CM: Z79.01 
ICD-9-CM: V58.61  3/30/2018 - Present Essential hypertension (Chronic) ICD-10-CM: I10 
ICD-9-CM: 401.9  1/22/2016 - Present CAD (coronary artery disease) ICD-10-CM: I25.10 ICD-9-CM: 414.00  10/9/2015 - Present Type II diabetes mellitus (HCC) (Chronic) ICD-10-CM: E11.9 ICD-9-CM: 250.00  10/9/2015 - Present RESOLVED: Syncope and collapse ICD-10-CM: R55 
ICD-9-CM: 780.2  9/29/2020 - 11/2/2020 RESOLVED: Cellulitis ICD-10-CM: L03.90 ICD-9-CM: 682.9  3/23/2020 - 5/29/2020 RESOLVED: Hypokalemia ICD-10-CM: E87.6 ICD-9-CM: 276.8  3/23/2020 - 5/29/2020 RESOLVED: Hyponatremia ICD-10-CM: E87.1 ICD-9-CM: 276.1  3/23/2020 - 5/29/2020 RESOLVED: Diarrhea ICD-10-CM: R19.7 ICD-9-CM: 787.91  3/23/2020 - 5/29/2020 RESOLVED: Syncope and collapse ICD-10-CM: R55 
ICD-9-CM: 780.2  7/13/2019 - 5/29/2020 RESOLVED: UTI (urinary tract infection) ICD-10-CM: N39.0 ICD-9-CM: 599.0  7/13/2019 - 11/2/2020 RESOLVED: Sepsis ([facility] ICD-10-CM: A41.9 ICD-9-CM: 038.9, 995.91  7/13/2019 - 11/3/2020 RESOLVED: Leg wound, left ICD-10-CM: E43.347P ICD-9-CM: 891.0  7/13/2019 - 5/29/2020 RESOLVED: Leg laceration, right, initial encounter ICD-10-CM: X14.852I ICD-9-CM: 894.0  7/13/2019 - 5/29/2020 RESOLVED: Cellulitis of right upper arm ICD-10-CM: L03.113 ICD-9-CM: 682.3  5/17/2019 - 5/29/2020 RESOLVED: Gangrene of finger of right hand ([facility] ICD-10-CM: T54 
ICD-9-CM: 785.4  5/17/2019 - 11/2/2020 RESOLVED: Bowel obstruction ([facility] ICD-10-CM: Q44.445 ICD-9-CM: 560.9  1/8/2019 - 5/29/2020 RESOLVED: Partial small bowel obstruction ([facility] ICD-10-CM: K56.600 ICD-9-CM: 560.9  1/5/2019 - 5/29/2020 RESOLVED: Dyspnea ICD-10-CM: R06.00 
ICD-9-CM: 786.09  11/13/2018 - 5/29/2020 RESOLVED: ARF (acute renal failure) (HCC) ICD-10-CM: N17.9 ICD-9-CM: 584.9  11/13/2018 - 5/29/2020 RESOLVED: Closed wedge compression fracture of T7 vertebra ([facility] ICD-10-CM: B39.465G ICD-9-CM: 805.2  11/13/2018 - 5/29/2020  RESOLVED: Type 2 diabetes with nephropathy (HCC) ICD-10-CM: E11.21 
 ICD-9-CM: 250.40, 583.81  10/1/2018 - 11/2/2020 RESOLVED: Chest pain ICD-10-CM: R07.9 ICD-9-CM: 786.50  6/27/2018 - 5/29/2020 RESOLVED: Abdominal pain ICD-10-CM: R10.9 ICD-9-CM: 789.00  6/27/2018 - 5/29/2020 RESOLVED: Coronary artery disease involving native coronary artery without angina pectoris (Chronic) ICD-10-CM: I25.10 ICD-9-CM: 414.01  1/22/2016 - 11/2/2020 RESOLVED: HTN (hypertension) ICD-10-CM: I10 
ICD-9-CM: 401.9  10/9/2015 - 1/22/2016 RESOLVED: NSTEMI (non-ST elevated myocardial infarction) ([facility] [address] [address] ICD-10-CM: I21.4 ICD-9-CM: 410.70  10/9/2015 - 5/29/2020 Subjective:  
 70 y.o. female with history that includes DM 2, CAD with MI, COPD on 3LNC baseline, and A. fib on Eliquis who was recently admitted to [facility] with anemia, pneumonia, and acute on chronic CHF then transferred to rehab was sent back to the rehab due to anemia and dyspnea. She has been somewhat dyspneic for the past couple of days constant and worsening to the point of being severe even at rest with increased O2 requirements up to 4 LNC associated with orthopnea. She was found to have a hemoglobin of 6.8 at the rehab center. However, here in the ER she was found to have hemoglobin at 7.2 and she was back to her 3LNC. It was reported dark stools which tested FOBT positive. She is on Eliquis as mentioned. (Dr Niko Sosa) 2/23:  Reports she if feeling better this AM with less SOB and O2 back to usual 3 liters. No ab pain. No CP. Sats ok on O2. Nurses report they are unable to get AM labs and pt needs central, PICC or mid line placed. She does not appear in any distress. Dr. Niko Sosa has ordered blood and consented pt. Her chronic GI bleeding may warrant complete DC of anticoagulation - discussed with GI and little else to do. Also consulting Cards for her chronic decompensation of CHF/A fib episodes with mult readmissions - there may be little to do for this either. 2/24:  C/O mild nausea this AM and somewhat fatigued but otherwise little change. Rate has been up and down requiring does of IV dig and Amiodarone drip. Cards also consulting EP for poss ablation. Cards and GI agree with restart of Eliquis - risk of CVA > risk of bleeds. 2/25:  Still c/o nausea and now having loose stools - 3 overnight. Declines capsule study for GI bleeding. Also, at this time, reports she does not want to have pacer or any other studies/treatments and wants to consider Hospice. Discussed risks v benefits of continued work up and treatment of problems but she reports \"getting tired of having things done to me. \"  She wants to have an information session with Hospice for further consideration. 2/26:  No nausea at this moment. Needed Zofran once last night and had one loose stool. CT of ab/pelvis reveals nothing major. Capsule study of small bowel today. She does not need to be off tele for the study as she has hard wired tele and not remote tele. She reports she will do the capsule study and \"if nothing to fix then maybe do that Hospice. \"  Back in sinus rhythm at this moment. 2/27:  More shortness of breath and back in A fib again - rate 100s - 110s. No nausea at this moment. Tmax 101.1. Tnow 99. Repeat cultures pending. Check port CXR. Lungs sound more congested this AM.  Hb 7.4 this AM.  Will get Card to see again now that she is back in A fib with symptoms. 2/28:  Feels fatigued and sleepy. No CP or shortness of breath. BP lower last night and Roland had to be started. Urine culture 2/26/21 with 100K gram neg rods and Merrem started. May need to go to ICU. She is going to be diff to manage her fluids/BP due to A fib, CHF and other med problems. Blood culture neg so far. Tmax 99.1. Hb 7.3. May need to transfuse again with her low BP. Cardiology is seeing and Pul reconsulted.   
  
Review of Systems: A comprehensive review of systems was negative except for that written in the HPI. Objective:  
Physical Exam:  
Visit Vitals BP (!) 104/48 Pulse 65 Temp 99.1 °F (37.3 °C) Resp 23 Ht 5' 7\" (1.702 m) Wt 109.6 kg (241 lb 11.2 oz) SpO2 99% BMI 37.86 kg/m² O2 Flow Rate (L/min): 4 l/min O2 Device: Nasal cannula Temp (24hrs), Av.8 °F (37.1 °C), Min:98.3 °F (36.8 °C), Max:99.1 °F (37.3 °C) No intake/output data recorded.  1901 -  0700 In: 580 [P.O.:580] Out: 1375 [IETI] General:  Alert, obese,cooperative, no distress, appears stated age. Head:  Normocephalic, without obvious abnormality, atraumatic. Eyes:  Conjunctivae/corneas clear. EOMs intact. Neck: Supple, symmetrical, trachea midline, no adenopathy, thyroid: no enlargement/tenderness/nodules, no carotid bruit and no JVD. Lungs:   Scattered rhonchi and A few basilar rales bilaterally. Chest wall:  No tenderness or deformity. Heart:  irreg,rate in low 100s Abdomen:   Soft, mild generalized tenderness without rebound or guarding. Bowel sounds normal. No masses,  No organomegaly. Extremities: no cyanosis. Trace LE edema on top of usual stasis changes. No calf tenderness or cords. Pulses: 2+ and symmetric all extremities. Skin:  turgor normal. No rashes Neurologic:   Alert and oriented X 3. Fine motor of hands and fingers normal.   equal.  No cogwheeling or rigidity. Gait not tested at this time. Sensation grossly normal to touch. Gross motor of extremities normal.    
 
Data Review: EXAM:  CT ABD PELV W WO CONT 21 INDICATION: unexplained anemia and abd pain COMPARISON: CT abdomen pelvis 3/23/2020. CONTRAST:  100 mL of Isovue-370. 
 FINDINGS:  
Lower Thorax: 
Lung Bases: Moderate left and small right pleural effusions with overlying 
subsegmental atelectasis in the lower lobes. Bilateral interlobular septal 
thickening with scattered groundglass opacities. Heart: Unchanged cardiomegaly. No pericardial effusion. Coronary arteries stents 
noted. A vascular catheter terminates at the cavoatrial junction. Abdomen/Pelvis: 
Liver:  No focal liver lesions. Biliary system: Gallbladder is surgically absent. No intrahepatic or 
extrahepatic biliary ductal dilatation. Spleen: Normal. 
Pancreas: Normal. 
Kidneys/Ureters/Bladder: Multifocal cortical scarring in the kidneys. No renal 
masses. There is a 3 mm nonobstructing stone in the upper pole the right kidney, 
and a 2 mm nonobstructing stone in the lower pole the right kidney. No 
hydronephrosis or hydroureter. The bladder is normal. 
Adrenals: Normal. 
Stomach/bowel: No evidence of active GI bleeding. Incidental 2.1 cm gastric 
lipoma noted within the gastric body. No dilation or abnormal wall thickening is 
present. No free intraperitoneal air noted. Normal appendix. Fat-containing 
umbilical hernia, with postsurgical changes noted in the ventral abdominal wall. Reproductive Organs: Status post hysterectomy. No suspicious adnexal masses. Vasculature: Normal caliber arteries. Moderate calcific atherosclerosis of the 
abdominal aorta and iliac vasculature. There are recent postoperative changes in 
the left groin overlying the left femoral vessels, with prominent 
hyperdense/calcific material noted in this region and surgical clips. There is a 
small superficial fluid collection in the left groin measuring 2.1 x 1.4 cm 
(series 6 image 207). Portal vein, SMV, and splenic vein are patent. Nodes: No pathologically enlarged lymph nodes. Fluid: No free fluid. Bones/Soft Tissue: No acute fractures or aggressive osseous lesions are seen. Chronic L3 compression fracture status post kyphoplasty. Chronic T7 compression 
fracture status post kyphoplasty. There is nonspecific subcutaneous edema noted 
within the left lateral abdominal wall. IMPRESSION 1. No evidence of active GI bleeding.  No evidence of acute process in the 
 abdomen or pelvis. 2. Moderate left and small right pleural effusions with overlying lower lobe 
subsegmental atelectasis. Pulmonary interstitial and alveolar edema. 3. Left inguinal postoperative changes involving the left femoral vessels, with 
a small superficial fluid collection overlying the left femoral vessels 
measuring 2.1 x 1.4 cm. Correlate with history. Consider ultrasound to evaluate 
for pseudoaneurysm and/or hematoma. 4. Nonspecific subcutaneous edema in the left lateral abdominal wall. 
  
Recent Days: 
Recent Labs  
  02/28/21 
0558 02/27/21 0258 02/26/21 
0227 WBC 13.4* 12.5* 14.6* HGB 7.3* 7.4* 7.5* HCT 25.6* 25.3* 26.2*  
 321 311 Recent Labs  
  02/28/21 
0558 02/27/21 0258 02/26/21 
1548  137 138  
K 3.7 3.8 3.6  102 104 CO2 28 31 31 GLU 98 117* 129* BUN 19 20 22* CREA 1.38* 1.38* 1.22* CA 8.0* 7.8* 7.8* ALB 1.9* 1.9* 2.0*  
TBILI 0.6 0.7 0.6 ALT 14 17 17 No results for input(s): PH, PCO2, PO2, HCO3, FIO2 in the last 72 hours. 24 Hour Results: 
Recent Results (from the past 24 hour(s)) CULTURE, BLOOD Collection Time: 02/27/21 10:33 AM  
 Specimen: Blood Result Value Ref Range Special Requests: NO SPECIAL REQUESTS Culture result: NO GROWTH AFTER 18 HOURS    
GLUCOSE, POC Collection Time: 02/27/21 11:59 AM  
Result Value Ref Range Glucose (POC) 153 (H) 65 - 100 mg/dL Performed by Saida Andrade ([facility]) GLUCOSE, POC Collection Time: 02/27/21  4:08 PM  
Result Value Ref Range Glucose (POC) 154 (H) 65 - 100 mg/dL Performed by Saida Andrade ([facility]) GLUCOSE, POC Collection Time: 02/27/21  8:57 PM  
Result Value Ref Range Glucose (POC) 123 (H) 65 - 100 mg/dL Performed by Jasso Range CBC WITH AUTOMATED DIFF Collection Time: 02/28/21  5:58 AM  
Result Value Ref Range WBC 13.4 (H) 3.6 - 11.0 K/uL  
 RBC 2.58 (L) 3.80 - 5.20 M/uL HGB 7.3 (L) 11.5 - 16.0 g/dL HCT 25.6 (L) 35.0 - 47.0 % MCV 99.2 (H) 80.0 - 99.0 FL  
 MCH 28.3 26.0 - 34.0 PG  
 MCHC 28.5 (L) 30.0 - 36.5 g/dL  
 RDW 17.2 (H) 11.5 - 14.5 % PLATELET 609 210 - 603 K/uL MPV 10.2 8.9 - 12.9 FL  
 NRBC 0.1 (H) 0  WBC ABSOLUTE NRBC 0.02 (H) 0.00 - 0.01 K/uL NEUTROPHILS 70 32 - 75 % BAND NEUTROPHILS 1 % LYMPHOCYTES 10 (L) 12 - 49 % MONOCYTES 12 5 - 13 % EOSINOPHILS 5 0 - 7 % BASOPHILS 1 0 - 1 % MYELOCYTES 1 % IMMATURE GRANULOCYTES 0 0.0 - 0.5 % ABS. NEUTROPHILS 9.5 (H) 1.8 - 8.0 K/UL  
 ABS. LYMPHOCYTES 1.3 0.8 - 3.5 K/UL  
 ABS. MONOCYTES 1.6 (H) 0.0 - 1.0 K/UL  
 ABS. EOSINOPHILS 0.7 (H) 0.0 - 0.4 K/UL  
 ABS. BASOPHILS 0.1 0.0 - 0.1 K/UL  
 ABS. IMM. GRANS. 0.0 0.00 - 0.04 K/UL  
 DF SMEAR SCANNED    
 RBC COMMENTS ANISOCYTOSIS 1+ 
    
 RBC COMMENTS MACROCYTOSIS 1+ 
    
 RBC COMMENTS HYPOCHROMIA 1+ WBC COMMENTS VACUOLATED POLYS METABOLIC PANEL, COMPREHENSIVE Collection Time: 02/28/21  5:58 AM  
Result Value Ref Range Sodium 137 136 - 145 mmol/L Potassium 3.7 3.5 - 5.1 mmol/L Chloride 102 97 - 108 mmol/L  
 CO2 28 21 - 32 mmol/L Anion gap 7 5 - 15 mmol/L Glucose 98 65 - 100 mg/dL BUN 19 6 - 20 MG/DL Creatinine 1.38 (H) 0.55 - 1.02 MG/DL  
 BUN/Creatinine ratio 14 12 - 20 GFR est AA 45 (L) >60 ml/min/1.73m2 GFR est non-AA 37 (L) >60 ml/min/1.73m2 Calcium 8.0 (L) 8.5 - 10.1 MG/DL Bilirubin, total 0.6 0.2 - 1.0 MG/DL  
 ALT (SGPT) 14 12 - 78 U/L  
 AST (SGOT) 18 15 - 37 U/L Alk. phosphatase 200 (H) 45 - 117 U/L Protein, total 4.3 (L) 6.4 - 8.2 g/dL Albumin 1.9 (L) 3.5 - 5.0 g/dL Globulin 2.4 2.0 - 4.0 g/dL A-G Ratio 0.8 (L) 1.1 - 2.2 GLUCOSE, POC Collection Time: 02/28/21  7:41 AM  
Result Value Ref Range Glucose (POC) 113 (H) 65 - 100 mg/dL Performed by [facility] reviewed Current Facility-Administered Medications Medication Dose Route Frequency  meropenem (MERREM) 500 mg in sterile water (preservative free) 10 mL IV syringe  0.5 g IntraVENous Q8H  
 dilTIAZem IR (CARDIZEM) tablet 30 mg  30 mg Oral TIDAC  PHENYLephrine (IVÁN-SYNEPHRINE) 30 mg in 0.9% sodium chloride 250 mL infusion   mcg/min IntraVENous TITRATE  nystatin (MYCOSTATIN) 100,000 unit/mL oral suspension 500,000 Units  500,000 Units Oral QID AFTER MEALS  
 amiodarone (CORDARONE) tablet 200 mg  200 mg Oral DAILY  metoprolol tartrate (LOPRESSOR) tablet 25 mg  25 mg Oral Q6H  
 insulin lispro (HUMALOG) injection   SubCUTAneous AC&HS  
 glucose chewable tablet 16 g  4 Tab Oral PRN  
 dextrose (D50W) injection syrg 12.5-25 g  25-50 mL IntraVENous PRN  
 glucagon (GLUCAGEN) injection 1 mg  1 mg IntraMUSCular PRN  pantoprazole (PROTONIX) 40 mg in 0.9% sodium chloride 10 mL injection  40 mg IntraVENous Q12H  
 sodium chloride (NS) flush 5-40 mL  5-40 mL IntraVENous Q8H  
 sodium chloride (NS) flush 5-40 mL  5-40 mL IntraVENous PRN  
 acetaminophen (TYLENOL) tablet 650 mg  650 mg Oral Q6H PRN Or  
 acetaminophen (TYLENOL) suppository 650 mg  650 mg Rectal Q6H PRN  polyethylene glycol (MIRALAX) packet 17 g  17 g Oral DAILY PRN  
 bisacodyL (DULCOLAX) suppository 10 mg  10 mg Rectal DAILY PRN  
 ondansetron (ZOFRAN) injection 4 mg  4 mg IntraVENous Q6H PRN  
 0.9% sodium chloride infusion 250 mL  250 mL IntraVENous PRN  
 apixaban (ELIQUIS) tablet 5 mg  5 mg Oral BID Care Plan discussed with: Patient and Nurse Total time spent with patient: 30 minutes.  
Rangel White MD

## 2021-03-01 NOTE — PROGRESS NOTES
Cardiology Progress Note [address] [place], [address] [facility] Phone [phone]; Fax [phone] 
 
 
 
3/1/2021 9:13 AM  
 
Admit Date:           2021 Admit Diagnosis:  Symptomatic anemia [D64.9] GIB (gastrointestinal bleeding) [K92.2] Acute blood loss anemia [D62] Dyspnea [R06.00] :          1945 MRN:          [id number] Impression Plan/Recommendation PAF Urosepsis/hypotension GI bleed? Anemia EDWARDS Chronic resp failure on 3 L home O2 Copd Chronic HFpEF Depression Deconditioned · Results from capsule study pending · In and out of AF over the weekend, now in NSR- holding BB/CCB d/t hypotension- on jimenez. Resume AV skyler agents as tolerated · Continue amiodarone and eliquis · Will diuresis prn/tolerated- chest xray Friday- stable · Plans for hospice meeting today? Reviewed plan with patient and attending. Will follow from a distance. No intake/output data recorded. Last 3 Recorded Weights in this Encounter 21 7134 21 0358 21 0500 Weight: 236 lb (107 kg) 241 lb 11.2 oz (109.6 kg) 242 lb 1 oz (109.8 kg)  1901 -  0700 In: 492.5 [P.O.:130] Out: 700 [Urine:700] SUBJECTIVE Dyke Moritz feels [employer] No chest pain, breathing comfortable Has sores in her nose Current Facility-Administered Medications Medication Dose Route Frequency  mirtazapine (REMERON) tablet 15 mg  15 mg Oral QHS  
 0.9% sodium chloride infusion 250 mL  250 mL IntraVENous PRN  
 arformoteroL (BROVANA) neb solution 15 mcg  15 mcg Nebulization BID RT  
 budesonide (PULMICORT) 500 mcg/2 ml nebulizer suspension  500 mcg Nebulization BID RT  
 montelukast (SINGULAIR) tablet 10 mg  10 mg Oral QHS  meropenem (MERREM) 500 mg in sterile water (preservative free) 10 mL IV syringe  0.5 g IntraVENous Q8H  
 [Held by provider] dilTIAZem IR (CARDIZEM) tablet 30 mg  30 mg Oral TIDAC  PHENYLephrine (IVÁN-SYNEPHRINE) 30 mg in 0.9% sodium chloride 250 mL infusion   mcg/min IntraVENous TITRATE  nystatin (MYCOSTATIN) 100,000 unit/mL oral suspension 500,000 Units  500,000 Units Oral QID AFTER MEALS  
 amiodarone (CORDARONE) tablet 200 mg  200 mg Oral DAILY  [Held by provider] metoprolol tartrate (LOPRESSOR) tablet 25 mg  25 mg Oral Q6H  
 insulin lispro (HUMALOG) injection   SubCUTAneous AC&HS  
 glucose chewable tablet 16 g  4 Tab Oral PRN  
 dextrose (D50W) injection syrg 12.5-25 g  25-50 mL IntraVENous PRN  
 glucagon (GLUCAGEN) injection 1 mg  1 mg IntraMUSCular PRN  pantoprazole (PROTONIX) 40 mg in 0.9% sodium chloride 10 mL injection  40 mg IntraVENous Q12H  
 sodium chloride (NS) flush 5-40 mL  5-40 mL IntraVENous Q8H  
 sodium chloride (NS) flush 5-40 mL  5-40 mL IntraVENous PRN  
 acetaminophen (TYLENOL) tablet 650 mg  650 mg Oral Q6H PRN Or  
 acetaminophen (TYLENOL) suppository 650 mg  650 mg Rectal Q6H PRN  polyethylene glycol (MIRALAX) packet 17 g  17 g Oral DAILY PRN  
 bisacodyL (DULCOLAX) suppository 10 mg  10 mg Rectal DAILY PRN  
 ondansetron (ZOFRAN) injection 4 mg  4 mg IntraVENous Q6H PRN  
 0.9% sodium chloride infusion 250 mL  250 mL IntraVENous PRN  
 apixaban (ELIQUIS) tablet 5 mg  5 mg Oral BID OBJECTIVE Intake/Output Summary (Last 24 hours) at 3/1/2021 6406 Last data filed at 2/28/2021 1826 Gross per 24 hour Intake 392.5 ml Output 200 ml Net 192.5 ml Review of Systems - History obtained from the patient AS PER  HPI Telemetry NSR 70s PHYSICAL EXAM  
  
 
Visit Vitals BP (!) 94/50 Pulse 85 Temp 98.1 °F (36.7 °C) Resp 19 Ht 5' 7\" (1.702 m) Wt 242 lb 1 oz (109.8 kg) SpO2 98% BMI 37.91 kg/m² Gen: Well-developed, obese, ill appearing,  tearful  alert and oriented x 3, resting HEENT:  Pink conjunctivae, [place]. No scleral icterus or conjunctival, moist mucous membranes Neck: Supple, CVL Resp: no accessory muscle use, fine crackles bases/distant BS Card: Regular Rate,Rythm,No murmurs, rubs or gallop. No thrills. GI:          soft, non-tender MSK: No cyanosis or clubbing Skin: No rashes or ulcers, ecchymosis Neuro:  Cranial nerves are grossly intact, moving all four extremities, no focal deficit, follows commands appropriately Psych:  Good insight, oriented to person, place and time, alert, flat Affect LE: +3 pitting BLE edema. Erythema/purplish discoloration  to BLE  
  
 
DATA REVIEW Laboratory and Imaging have been reviewed by me and are notable for No results for input(s): CPK, CKMB, TROIQ in the last 72 hours. Recent Labs 03/01/21 
0500 02/28/21 
5107 02/27/21 
4914  137 137  
K 3.5 3.7 3.8 CO2 27 28 31 BUN 17 19 20 CREA 1.26* 1.38* 1.38* GLU 82 98 117* WBC 12.6* 13.4* 12.5* HGB 8.4* 7.3* 7.4* HCT 27.9* 25.6* 25.3*  
 358 321 Galdino Cooper, NP

## 2021-03-01 NOTE — PROGRESS NOTES
Bedside and Verbal shift change report given to Tho 44 (oncoming nurse) by Donaldo Pedroza RN (offgoing nurse). Report included the following information SBAR, Kardex, Intake/Output, Accordion and Recent Results. SHIFT REPORT: 
 
DOCUMENTING IN DISASTER MODE 
 
1702: spoke with Dr. Mildred Ruano about BP. Received orders to increase jimenez and give 100cc bolus 1826: per Dr. Celeste Frederick MAP >60 Bedside and Verbal shift change report given to José Manuel 19 (oncoming nurse) by Edwige Veloz RN  (offgoing nurse). Report included the following information SBAR, Kardex, Intake/Output, Accordion and Recent Results.

## 2021-03-01 NOTE — PROGRESS NOTES
Comprehensive Nutrition Assessment Type and Reason for Visit: Juan Canales Nutrition Recommendations/Plan: 1. Continue GI lite diet. 2. Continue chocolate Glucerna BID to promote adequate intake. Nutrition Assessment:     
3/1: F/u. Pt sleeping soundly at time of visit, lunch tray in room untouched so far. Recorded intakes poor, 0% meals. Will continue Glucerna as pt has enjoyed them in the past. Pt remains on pressors- Roland @95. Per MD note, pt again considering hospice, also feels depressed. Started on Remeron. Will continue to monitor intakes and goals of care. Labs- Ca 7.7, Cr 1.26, Hgb 8.4. Meds- Merrem, Protonix, [email or web address]. 2/25: 69 y/o female admitted with GIB, symptomatic anemia. PMH includes DM, COPD, CAD, PAD, PVD. Pt sleeping soundly at time of visit. Lunch tray in room untouched so far. Recorded intake of 0% breakfast this AM. Per EMr, pt has had 4.5% wt loss x1 month which is not considered significant for time frame, however, pt also with significant edema so wt loss may be more significant than CBW shows. RD familiar with pt from previous admissions. Pt has liked Glucerna in the past, will add while here to promote intake. Will continue to monitor. Labs- Hgb 8.0, Ca 7.4, Cr 1.16, -145-160. Meds- insulin, Zofran, Protonix. Intakes: 
Patient Vitals for the past 168 hrs: 
 % Diet Eaten 02/28/21 0900 0 % 02/25/21 1352 0 %  
02/25/21 1116 0 % Weight hx: Wt Readings from Last 10 Encounters:  
03/01/21 109.8 kg (242 lb 1 oz) 02/15/21 105.2 kg (232 lb) 02/08/21 115.2 kg (254 lb)  
01/21/21 113.4 kg (250 lb) 01/15/21 113.4 kg (250 lb) 11/02/20 115.7 kg (255 lb) 10/09/20 116 kg (255 lb 11.2 oz)  
07/31/20 115.6 kg (254 lb 12.8 oz) 05/30/20 111.7 kg (246 lb 3.2 oz)  
03/23/20 113.4 kg (250 lb) Malnutrition Assessment: 
Malnutrition Status: insufficient data Estimated Daily Nutrient Needs: Energy (kcal): 5077(1295 x 1.3 AF); Weight Used for Energy Requirements: Current Protein (g): 108(1-1.2 g/kg); Weight Used for Protein Requirements: Current Fluid (ml/day): 2115; Method Used for Fluid Requirements: 1 ml/kcal 
 
 
Nutrition Related Findings:  last BM 2/27; 3+ pitting LE edema Wounds:   
Multiple(arm skin tear, knee blister, buttock blanchable redness) Current Nutrition Therapies: DIET NUTRITIONAL SUPPLEMENTS Breakfast, Dinner; Glucerna Shake DIET GI LITE (POST SURGICAL) Anthropometric Measures: 
· Height:  5' 7\" (170.2 cm) · Current Body Wt:  109.8 kg (242 lb 1 oz) · Admission Body Wt:      
· Usual Body Wt:       
· Ideal Body Wt:  135 lbs:  177.7 % · Adjusted Body Weight:   ; Weight Adjustment for: No adjustment · Adjusted BMI:      
· BMI Category:  Obese class 2 (BMI 35.0-39. 9) Nutrition Diagnosis:  
· Inadequate oral intake related to inadequate protein-energy intake as evidenced by intake 0-25%, weight loss 3/1: Nutrition dx continues. Nutrition Interventions:  
Food and/or Nutrient Delivery: Continue current diet, Start oral nutrition supplement Nutrition Education and Counseling: No recommendations at this time Coordination of Nutrition Care: Continue to monitor while inpatient Goals: 
PO intake >50% meals + ONS next 2-4 days Nutrition Monitoring and Evaluation:  
Behavioral-Environmental Outcomes: None identified Food/Nutrient Intake Outcomes: Food and nutrient intake, Supplement intake Physical Signs/Symptoms Outcomes: Weight, Biochemical data, Skin Discharge Planning:   
Continue current diet, Continue oral nutrition supplement Electronically signed by Rohit Banks RDN on 3/1/2021 Contact: [phone]

## 2021-03-01 NOTE — PROGRESS NOTES
Bedside and Verbal shift change report given to Adina Wilson (oncoming nurse) by Brooklynn Schmitt RN (offgoing nurse). Report included the following information SBAR, Kardex, ED Summary, Intake/Output, Recent Results, Med Rec Status and Cardiac Rhythm NSR. This patient was assisted with Intentional Toileting every 2 hours during this shift. Documentation of ambulation and output reflected on Flowsheet. 2030: Pt lethargic, wakes up with verbal command; oriented to place, time, situation, and person. Denies pain. Roland drip at 85 mcg/min. B/P 107/48, HR 78. RR 20, Spo2 @ 99% with 4 L O2 per NC. Patient Vitals for the past 12 hrs: 
 Temp Pulse Resp BP SpO2  
03/01/21 0810 98.7 °F (37.1 °C) 83 20 (!) 109/51 99 % 03/01/21 0700  85     
03/01/21 0606  89  (!) 94/50   
03/01/21 0400 98.1 °F (36.7 °C) 85 19 (!) 101/44 98 % 03/01/21 0330  87 30 (!) 129/56 94 % 03/01/21 0300  84 23 (!) 118/51 97 % 03/01/21 0230  84 22 (!) 108/48 98 % 03/01/21 0200  87 29 (!) 145/58 91 % 03/01/21 0130  83 20 (!) 118/50 98 % 03/01/21 0100  86 21 (!) 119/49 99 % 03/01/21 0030 99.1 °F (37.3 °C) 82 25 (!) 91/48 98 % 03/01/21 0000  83 23 (!) 107/48 98 % 02/28/21 2330  81 20 (!) 104/43 98 % 02/28/21 2326  81  (!) 110/36   
02/28/21 2300  83 21 (!) 110/36 98 % 02/28/21 2230  81 20 (!) 113/48 98 % 02/28/21 2223  78     
02/28/21 2200  87 28 (!) 137/51 98 % 9721: B/P soft/low @ 94/50,  Increased Roland drip to 90 mcg/min. Bedside and Verbal shift change report given to Zarina Haney RN (oncoming nurse) by Adina Wilson (offgoing nurse). Report included the following information SBAR, Kardex, ED Summary, Intake/Output, Recent Results, Med Rec Status and Cardiac Rhythm NSR.

## 2021-03-01 NOTE — PROGRESS NOTES
[facility]: \"I have wounds\" HPI: 70year old female with moderately severe COPD, CKD, afib on eliquis, HAYES who presented last week with anemia and dyspnea. Workup is ongoing into the etiology of her anemia. She is now requiring jimenez, however, and pulmonary has been asked to evaluate for possible ICU needs. Patient is alert and complaining about her wounds. States her breathing feels fine. Isn't very forthcoming with any history, however. She is followed in our office but hasn't been seen in nearly a year and is often quite sporadic with follow up. She is maintained on dulera, spiriva, PRN albuterol, singulair and O2. Also states she is compliant with CPAP at night but hasn't followed in sleep in a long time. PMH: Moderately severe COPD (FEV1 51% pred), chronic hypoxemic respiratory failure on 2-3L home O2 DM, CAD, GERD, DVT, HTN, HAYES on CPAP, afib in eliquis, HFpEF, obesity, appendectomy, hysterectomy, anemia SH: States she quit smoking \"a couple of years ago\" FH: DM, CAD Interval history Afebrile Hypotensive - on Jimenez at just increased to 90mcg Sats 99% on 3L WBC 12.6 - stable Hgb 8.4 - better s/p 1 unit PRBC yesterday Creat 1.26 - stable Blood cx no growth x 2 days Urine cx with ESBL 
 
ROS: Has no specific complaints. Denies SOB. Denies CP. Denies LE pain unless being pressed/touched. Denies fever, chills, cough. PE:  
Vitals reviewed Gen: Chronically ill appearing, fatigued, no acute distress HEENT: NCAT, EOMI Mouth: Moist oral mucosa, normal pharynx, crowded airway CV: current NST; in and out of afib over the weekend Lungs: CTA B/L, mild diminished GI: Soft, non tender, obese Ext: Moves all, lymphadema, chronic stasis changes, erythremic Neuro: Alert, answers questions appropriately, grossly nonfocal 
 
Lab Results Component Value Date/Time  WBC 12.6 (H) 03/01/2021 05:00 AM  
 Hemoglobin (POC) 13.9 10/09/2015 12:27 PM  
 HGB 8.4 (L) 03/01/2021 05:00 AM  
 Hematocrit (POC) 26 (L) 08/01/2019 11:41 AM  
 HCT 27.9 (L) 03/01/2021 05:00 AM  
 PLATELET 729 45/98/8125 05:00 AM  
 MCV 96.9 03/01/2021 05:00 AM  
 
Lab Results Component Value Date/Time Sodium 139 03/01/2021 05:00 AM  
 Potassium 3.5 03/01/2021 05:00 AM  
 Chloride 103 03/01/2021 05:00 AM  
 CO2 27 03/01/2021 05:00 AM  
 Anion gap 9 03/01/2021 05:00 AM  
 Glucose 82 03/01/2021 05:00 AM  
 BUN 17 03/01/2021 05:00 AM  
 Creatinine 1.26 (H) 03/01/2021 05:00 AM  
 BUN/Creatinine ratio 13 03/01/2021 05:00 AM  
 GFR est AA 50 (L) 03/01/2021 05:00 AM  
 GFR est non-AA 42 (L) 03/01/2021 05:00 AM  
 Calcium 7.7 (L) 03/01/2021 05:00 AM  
 
CXR Results  (Last 48 hours) 02/27/21 1220  XR CHEST PORT Final result Impression:  Cardiomegaly. Mild pulmonary edema and possible small left pleural  
effusion without significant change Narrative:  EXAM: XR CHEST PORT INDICATION: follow up COMPARISON: 2/26/2021 FINDINGS: A portable AP radiograph of the chest was obtained at 12:15 hours. Patient's on cardiac monitor. Right IJ central venous catheter is noted with the  
tip in the right atrium. . Mild pulmonary edema. Possible small left pleural  
effusion. Leland Rathke Heart size enlarged. .  The bones and soft tissues are grossly within  
normal limits. Impression: 
-hypotension, multifactorial with sepsis from UTI, cardiac disease, anemia. On low dose jimenez 
-moderately severe COPD not in acute exacerbation 
-chronic hypoxic respiratory failure 
-afib with RVR, HFpEF with volume overload 
-anemia -ESBL Plan: 
-continue jimenez to goal MAP 65/. If gets to 100 mcg or greater will need ICU transfer. Discussed with nursing. 
-continue antibiotics and f/u blood cx - on meropenem 
-if BP drops lower can consider adding SDS; blood sugars will need to be monitored closely 
-hold antihypertensives/diuretics--holding CCB/BB 
-anemia workup per GI. Still on eliquis. -cardiology following. Continue amiodarone 
-continue Brovana/Pulmicort nebs and add on Atrovent nebs in substitution for home Spriva/Dulera 
-Continue O2 to keep sats > 90%; on 3L at baseline 
-Protonix 
-palliative care following, patient reportedly open to hospice discussions. Tough situation with multi organ dysfunction making care difficult Patient critically ill requiring increasing levels of pressor support due to worsening hypotension and is high risk for further decompensation. CC time EOP 32+ min. Safia Lewis PA-C [facility]

## 2021-03-01 NOTE — PROGRESS NOTES
Daily Progress Note: 3/1/2021 Pa Arcos MD 
 
Assessment/Plan:  
Acute blood loss anemia due to GIB / Symptomatic anemia:   
-  transfuse as needed. GI consulted. -  Capsule study result pending.  
  
Acute on chronic systolic CHF /  Elevated BNP/ A fib with RVR:   
-  Cardiology consulted.  
  
Dyspnea: currently improved to baseline 
  
COPD: Appears to be stable and at baseline with no exacerbation. Continue with baseline oxygen supplementation and increase as needed. Scheduled and as needed bronchodilators. 
  
Atrial fibrillation ([address] (11/16/2018) on Eliquis:  
- per Cardiology 
  
DM type 2:  Monitor blood glucose levels with insulin sliding scale coverage. No basal coverage for now. Monitor for complications. UTI 
- urine culture with E Coli ESBL sensitive to Merrem 
  
Body mass index is 36.96 kg/m².: 30.0 - 39.9 Obese   
   
 
Problem List: 
Problem List as of 3/1/2021 Date Reviewed: 11/2/2020 Codes Class Noted - Resolved Elevated brain natriuretic peptide (BNP) level ICD-10-CM: R79.89 ICD-9-CM: 790.99  2/23/2021 - Present Acute blood loss anemia ICD-10-CM: D62 
ICD-9-CM: 285.1  2/23/2021 - Present Dyspnea ICD-10-CM: R06.00 
ICD-9-CM: 786.09  2/23/2021 - Present GIB (gastrointestinal bleeding) ICD-10-CM: K92.2 ICD-9-CM: 578.9  2/23/2021 - Present Symptomatic anemia ICD-10-CM: D64.9 ICD-9-CM: 285.9  2/23/2021 - Present Pneumonia ICD-10-CM: J18.9 ICD-9-CM: 037  1/26/2021 - Present SOB (shortness of breath) ICD-10-CM: R06.02 
ICD-9-CM: 786.05  1/26/2021 - Present Anemia ICD-10-CM: D64.9 ICD-9-CM: 285.9  1/26/2021 - Present Acute on chronic systolic CHF (congestive heart failure) (HCC) ICD-10-CM: H46.42 ICD-9-CM: 428.23, 428.0  1/26/2021 - Present Uncontrolled type 2 diabetes mellitus with hyperglycemia (HCC) ICD-10-CM: E11.65 ICD-9-CM: 250.02  1/26/2021 - Present  Left lower lobe pneumonia ICD-10-CM: J18.9 
ICD-9-CM: 486  1/1/2021 - Present  
   
 Leukocytosis ICD-10-CM: D72.829 
ICD-9-CM: 288.60  11/3/2020 - Present  
   
 Chronic respiratory failure with hypoxia (HCC) ICD-10-CM: J96.11 
ICD-9-CM: 518.83, 799.02  11/2/2020 - Present  
 Overview Signed 11/2/2020 10:12 PM by Li Lam MD  
  On 3L NC at home  
  
  
   
 Cellulitis of lower extremity ICD-10-CM: L03.119 
ICD-9-CM: 682.6  3/23/2020 - Present  
   
 ASO (arteriosclerosis obliterans) ICD-10-CM: I70.90 
ICD-9-CM: 440.9  9/5/2019 - Present  
   
 PVD (peripheral vascular disease) (HCC) ICD-10-CM: I73.9 
ICD-9-CM: 443.9  8/1/2019 - Present  
   
 Severe obesity (HCC) ICD-10-CM: E66.01 
ICD-9-CM: 278.01  7/30/2019 - Present  
   
 COPD (chronic obstructive pulmonary disease) (HCC) ICD-10-CM: J44.9 
ICD-9-CM: 496  7/13/2019 - Present  
   
 Normocytic anemia ICD-10-CM: D64.9 
ICD-9-CM: 285.9  7/13/2019 - Present  
   
 PAD (peripheral artery disease) (HCC) ICD-10-CM: I73.9 
ICD-9-CM: 443.9  7/13/2019 - Present  
   
 Mild intermittent asthma ICD-10-CM: J45.20 
ICD-9-CM: 493.90  5/17/2019 - Present  
   
 Brachial artery thrombus (HCC) ICD-10-CM: I74.2 
ICD-9-CM: 444.21  4/26/2019 - Present  
   
 Sleep apnea ICD-10-CM: G47.30 
ICD-9-CM: 780.57  1/5/2019 - Present  
 Overview Signed 1/5/2019  8:41 PM by Franklin Solano Jr, DO  
  wears CPAP 
  
  
   
 Atrial fibrillation (HCC) ICD-10-CM: I48.91 
ICD-9-CM: 427.31  11/16/2018 - Present  
   
 Obesity (BMI 30-39.9) (Chronic) ICD-10-CM: E66.9 
ICD-9-CM: 278.00  11/13/2018 - Present  
   
 Recurrent deep vein thrombosis (DVT) (HCC) ICD-10-CM: I82.409 
ICD-9-CM: 453.40  3/30/2018 - Present  
   
 Chronic anticoagulation (Chronic) ICD-10-CM: Z79.01 
ICD-9-CM: V58.61  3/30/2018 - Present  
   
 Essential hypertension (Chronic) ICD-10-CM: I10 
ICD-9-CM: 401.9  1/22/2016 - Present  
   
 CAD (coronary artery disease) ICD-10-CM: I25.10 
 ICD-9-CM: 414.00  10/9/2015 - Present Type II diabetes mellitus (HCC) (Chronic) ICD-10-CM: E11.9 ICD-9-CM: 250.00  10/9/2015 - Present RESOLVED: Syncope and collapse ICD-10-CM: R55 
ICD-9-CM: 780.2  9/29/2020 - 11/2/2020 RESOLVED: Cellulitis ICD-10-CM: L03.90 ICD-9-CM: 682.9  3/23/2020 - 5/29/2020 RESOLVED: Hypokalemia ICD-10-CM: E87.6 ICD-9-CM: 276.8  3/23/2020 - 5/29/2020 RESOLVED: Hyponatremia ICD-10-CM: E87.1 ICD-9-CM: 276.1  3/23/2020 - 5/29/2020 RESOLVED: Diarrhea ICD-10-CM: R19.7 ICD-9-CM: 787.91  3/23/2020 - 5/29/2020 RESOLVED: Syncope and collapse ICD-10-CM: R55 
ICD-9-CM: 780.2  7/13/2019 - 5/29/2020 RESOLVED: UTI (urinary tract infection) ICD-10-CM: N39.0 ICD-9-CM: 599.0  7/13/2019 - 11/2/2020 RESOLVED: Sepsis ([facility] ICD-10-CM: A41.9 ICD-9-CM: 038.9, 995.91  7/13/2019 - 11/3/2020 RESOLVED: Leg wound, left ICD-10-CM: V59.600M ICD-9-CM: 891.0  7/13/2019 - 5/29/2020 RESOLVED: Leg laceration, right, initial encounter ICD-10-CM: V87.551Y ICD-9-CM: 894.0  7/13/2019 - 5/29/2020 RESOLVED: Cellulitis of right upper arm ICD-10-CM: L03.113 ICD-9-CM: 682.3  5/17/2019 - 5/29/2020 RESOLVED: Gangrene of finger of right hand ([facility] ICD-10-CM: V92 
ICD-9-CM: 785.4  5/17/2019 - 11/2/2020 RESOLVED: Bowel obstruction ([facility] ICD-10-CM: M81.227 ICD-9-CM: 560.9  1/8/2019 - 5/29/2020 RESOLVED: Partial small bowel obstruction ([facility] ICD-10-CM: K56.600 ICD-9-CM: 560.9  1/5/2019 - 5/29/2020 RESOLVED: Dyspnea ICD-10-CM: R06.00 
ICD-9-CM: 786.09  11/13/2018 - 5/29/2020 RESOLVED: ARF (acute renal failure) (HCC) ICD-10-CM: N17.9 ICD-9-CM: 584.9  11/13/2018 - 5/29/2020 RESOLVED: Closed wedge compression fracture of T7 vertebra ([facility] ICD-10-CM: W26.012G ICD-9-CM: 805.2  11/13/2018 - 5/29/2020  RESOLVED: Type 2 diabetes with nephropathy (HCC) ICD-10-CM: E11.21 
 ICD-9-CM: 250.40, 583.81  10/1/2018 - 11/2/2020 RESOLVED: Chest pain ICD-10-CM: R07.9 ICD-9-CM: 786.50  6/27/2018 - 5/29/2020 RESOLVED: Abdominal pain ICD-10-CM: R10.9 ICD-9-CM: 789.00  6/27/2018 - 5/29/2020 RESOLVED: Coronary artery disease involving native coronary artery without angina pectoris (Chronic) ICD-10-CM: I25.10 ICD-9-CM: 414.01  1/22/2016 - 11/2/2020 RESOLVED: HTN (hypertension) ICD-10-CM: I10 
ICD-9-CM: 401.9  10/9/2015 - 1/22/2016 RESOLVED: NSTEMI (non-ST elevated myocardial infarction) ([facility] [address] [address] ICD-10-CM: I21.4 ICD-9-CM: 410.70  10/9/2015 - 5/29/2020 Subjective:  
 70 y.o. female with history that includes DM 2, CAD with MI, COPD on 3LNC baseline, and A. fib on Eliquis who was recently admitted to [facility] with anemia, pneumonia, and acute on chronic CHF then transferred to rehab was sent back to the rehab due to anemia and dyspnea. She has been somewhat dyspneic for the past couple of days constant and worsening to the point of being severe even at rest with increased O2 requirements up to 4 LNC associated with orthopnea. She was found to have a hemoglobin of 6.8 at the rehab center. However, here in the ER she was found to have hemoglobin at 7.2 and she was back to her 3LNC. It was reported dark stools which tested FOBT positive. She is on Eliquis as mentioned. (Dr Javed Akins) 2/23:  Reports she if feeling better this AM with less SOB and O2 back to usual 3 liters. No ab pain. No CP. Sats ok on O2. Nurses report they are unable to get AM labs and pt needs central, PICC or mid line placed. She does not appear in any distress. Dr. Javed Akins has ordered blood and consented pt. Her chronic GI bleeding may warrant complete DC of anticoagulation - discussed with GI and little else to do. Also consulting Cards for her chronic decompensation of CHF/A fib episodes with mult readmissions - there may be little to do for this either. 2/24:  C/O mild nausea this AM and somewhat fatigued but otherwise little change. Rate has been up and down requiring does of IV dig and Amiodarone drip. Cards also consulting EP for poss ablation. Cards and GI agree with restart of Eliquis - risk of CVA > risk of bleeds. 2/25:  Still c/o nausea and now having loose stools - 3 overnight. Declines capsule study for GI bleeding. Also, at this time, reports she does not want to have pacer or any other studies/treatments and wants to consider Hospice. Discussed risks v benefits of continued work up and treatment of problems but she reports \"getting tired of having things done to me. \"  She wants to have an information session with Hospice for further consideration. 2/26:  No nausea at this moment. Needed Zofran once last night and had one loose stool. CT of ab/pelvis reveals nothing major. Capsule study of small bowel today. She does not need to be off tele for the study as she has hard wired tele and not remote tele. She reports she will do the capsule study and \"if nothing to fix then maybe do that Hospice. \"  Back in sinus rhythm at this moment. 2/27:  More shortness of breath and back in A fib again - rate 100s - 110s. No nausea at this moment. Tmax 101.1. Tnow 99. Repeat cultures pending. Check port CXR. Lungs sound more congested this AM.  Hb 7.4 this AM.  Will get Card to see again now that she is back in A fib with symptoms. 2/28:  Feels fatigued and sleepy. No CP or shortness of breath. BP lower last night and Roland had to be started. Urine culture 2/26/21 with 100K gram neg rods and Merrem started. May need to go to ICU. She is going to be diff to manage her fluids/BP due to A fib, CHF and other med problems. Blood culture neg so far. Tmax 99.1. Hb 7.3. May need to transfuse again with her low BP. Cardiology is seeing and Pul reconsulted. 3/:  Feeling a little better today in general.  Less sleepy. She is not moving about much - encouraged her to work with PT/OT. BP trending better but still low - still on pressor. Dilt and Metoprolol being held. Urine culture with E Coli ESBL again but sensitive to Merrem. Blood cult neg so far. Transfused 1 unit PRCs yesterday. Tearful and wants to consider hospice but also admits to feeling depressed - will add Remeron as she is on QTc prolonging meds. Consult had been placed to Hospice several days ago for information session.   
  
Review of Systems: A comprehensive review of systems was negative except for that written in the HPI. Objective:  
Physical Exam:  
Visit Vitals BP (!) 94/50 Pulse 85 Temp 98.1 °F (36.7 °C) Resp 19 Ht 5' 7\" (1.702 m) Wt 109.8 kg (242 lb 1 oz) SpO2 98% BMI 37.91 kg/m² O2 Flow Rate (L/min): 3 l/min O2 Device: Nasal cannula Temp (24hrs), Av °F (37.2 °C), Min:98.1 °F (36.7 °C), Max:99.6 °F (37.6 °C) No intake/output data recorded.  190 -  0700 In: 492.5 [P.O.:130] Out: 700 [Urine:700] General:  Alert, obese,cooperative, no distress, appears stated age. Head:  Normocephalic, without obvious abnormality, atraumatic. Eyes:  Conjunctivae/corneas clear. EOMs intact. Neck: Supple, symmetrical, trachea midline, no adenopathy, thyroid: no enlargement/tenderness/nodules, no carotid bruit and no JVD. Lungs:   Fairly clear except a few basilar rales bilaterally. Chest wall:  No tenderness or deformity. Heart:  Reg today with rate in 80s; 1/6 GRISELDA right 2nd ICS. Abdomen:   Soft, mild generalized tenderness without rebound or guarding. Bowel sounds normal. No masses,  No organomegaly. Extremities: no cyanosis. Trace LE edema on top of usual stasis changes. No calf tenderness or cords. Pulses: 2+ and symmetric all extremities. Skin:  turgor normal. No rashes other than stasis changes in LEs Neurologic:   Alert and oriented X 3. Fine motor of hands and fingers normal.   equal.  No cogwheeling or rigidity. Gait not tested at this time. Sensation grossly normal to touch. Gross motor of extremities normal.    
 
Data Review: EXAM:  CT ABD PELV W WO CONT 2/25/21 INDICATION: unexplained anemia and abd pain COMPARISON: CT abdomen pelvis 3/23/2020. CONTRAST:  100 mL of Isovue-370. 
 FINDINGS:  
Lower Thorax: 
Lung Bases: Moderate left and small right pleural effusions with overlying 
subsegmental atelectasis in the lower lobes. Bilateral interlobular septal 
thickening with scattered groundglass opacities. Heart: Unchanged cardiomegaly. No pericardial effusion. Coronary arteries stents 
noted. A vascular catheter terminates at the cavoatrial junction. Abdomen/Pelvis: 
Liver:  No focal liver lesions. Biliary system: Gallbladder is surgically absent. No intrahepatic or 
extrahepatic biliary ductal dilatation. Spleen: Normal. 
Pancreas: Normal. 
Kidneys/Ureters/Bladder: Multifocal cortical scarring in the kidneys. No renal 
masses. There is a 3 mm nonobstructing stone in the upper pole the right kidney, 
and a 2 mm nonobstructing stone in the lower pole the right kidney. No 
hydronephrosis or hydroureter. The bladder is normal. 
Adrenals: Normal. 
Stomach/bowel: No evidence of active GI bleeding. Incidental 2.1 cm gastric 
lipoma noted within the gastric body. No dilation or abnormal wall thickening is 
present. No free intraperitoneal air noted. Normal appendix. Fat-containing 
umbilical hernia, with postsurgical changes noted in the ventral abdominal wall. Reproductive Organs: Status post hysterectomy. No suspicious adnexal masses. Vasculature: Normal caliber arteries. Moderate calcific atherosclerosis of the 
abdominal aorta and iliac vasculature. There are recent postoperative changes in 
the left groin overlying the left femoral vessels, with prominent hyperdense/calcific material noted in this region and surgical clips. There is a 
small superficial fluid collection in the left groin measuring 2.1 x 1.4 cm 
(series 6 image 207). Portal vein, SMV, and splenic vein are patent. Nodes: No pathologically enlarged lymph nodes. Fluid: No free fluid. Bones/Soft Tissue: No acute fractures or aggressive osseous lesions are seen. Chronic L3 compression fracture status post kyphoplasty. Chronic T7 compression 
fracture status post kyphoplasty. There is nonspecific subcutaneous edema noted 
within the left lateral abdominal wall. IMPRESSION 1. No evidence of active GI bleeding. No evidence of acute process in the 
abdomen or pelvis. 2. Moderate left and small right pleural effusions with overlying lower lobe 
subsegmental atelectasis. Pulmonary interstitial and alveolar edema. 3. Left inguinal postoperative changes involving the left femoral vessels, with 
a small superficial fluid collection overlying the left femoral vessels 
measuring 2.1 x 1.4 cm. Correlate with history. Consider ultrasound to evaluate 
for pseudoaneurysm and/or hematoma. 4. Nonspecific subcutaneous edema in the left lateral abdominal wall. 
  
Recent Days: 
Recent Labs 03/01/21 
0500 02/28/21 
1543 02/27/21 
2393 WBC 12.6* 13.4* 12.5* HGB 8.4* 7.3* 7.4* HCT 27.9* 25.6* 25.3*  
 358 321 Recent Labs 03/01/21 
0500 02/28/21 
5294 02/27/21 
1056  137 137  
K 3.5 3.7 3.8  102 102 CO2 27 28 31 GLU 82 98 117* BUN 17 19 20 CREA 1.26* 1.38* 1.38* CA 7.7* 8.0* 7.8* ALB 1.8* 1.9* 1.9* TBILI 0.8 0.6 0.7 ALT 12 14 17 No results for input(s): PH, PCO2, PO2, HCO3, FIO2 in the last 72 hours. 24 Hour Results: 
Recent Results (from the past 24 hour(s)) GLUCOSE, POC Collection Time: 02/28/21  7:41 AM  
Result Value Ref Range Glucose (POC) 113 (H) 65 - 100 mg/dL  Performed by Cj Head   
RBC, ALLOCATE  
 Collection Time: 02/28/21 10:15 AM  
Result Value Ref Range HISTORY CHECKED? Historical check performed GLUCOSE, POC Collection Time: 02/28/21 11:55 AM  
Result Value Ref Range Glucose (POC) 116 (H) 65 - 100 mg/dL Performed by Henrry Carolina TYPE & SCREEN Collection Time: 02/28/21 12:31 PM  
Result Value Ref Range Crossmatch Expiration 03/03/2021,2359 ABO/Rh(D) A POSITIVE Antibody screen NEG Unit number [id number] Blood component type [facility] Unit division 00 Status of unit ISSUED Crossmatch result Compatible GLUCOSE, POC Collection Time: 02/28/21  4:23 PM  
Result Value Ref Range Glucose (POC) 103 (H) 65 - 100 mg/dL Performed by Mara Mai (PCT) GLUCOSE, POC Collection Time: 02/28/21 10:29 PM  
Result Value Ref Range Glucose (POC) 100 65 - 100 mg/dL Performed by Mica AdamJennifer) (PCT) CBC WITH AUTOMATED DIFF Collection Time: 03/01/21  5:00 AM  
Result Value Ref Range WBC 12.6 (H) 3.6 - 11.0 K/uL  
 RBC 2.88 (L) 3.80 - 5.20 M/uL HGB 8.4 (L) 11.5 - 16.0 g/dL HCT 27.9 (L) 35.0 - 47.0 % MCV 96.9 80.0 - 99.0 FL  
 MCH 29.2 26.0 - 34.0 PG  
 MCHC 30.1 30.0 - 36.5 g/dL  
 RDW 18.3 (H) 11.5 - 14.5 % PLATELET 706 183 - 401 K/uL MPV 9.7 8.9 - 12.9 FL  
 NRBC 0.0 0  WBC ABSOLUTE NRBC 0.00 0.00 - 0.01 K/uL NEUTROPHILS 64 32 - 75 % BAND NEUTROPHILS 8 (H) 0 - 6 % LYMPHOCYTES 7 (L) 12 - 49 % MONOCYTES 14 (H) 5 - 13 % EOSINOPHILS 5 0 - 7 % BASOPHILS 2 (H) 0 - 1 % IMMATURE GRANULOCYTES 0 %  
 ABS. NEUTROPHILS 9.0 (H) 1.8 - 8.0 K/UL  
 ABS. LYMPHOCYTES 0.9 0.8 - 3.5 K/UL  
 ABS. MONOCYTES 1.8 (H) 0.0 - 1.0 K/UL  
 ABS. EOSINOPHILS 0.6 (H) 0.0 - 0.4 K/UL  
 ABS. BASOPHILS 0.3 (H) 0.0 - 0.1 K/UL  
 ABS. IMM. GRANS. 0.0 K/UL  
 DF MANUAL    
 RBC COMMENTS ANISOCYTOSIS 1+ 
    
 RBC COMMENTS HYPOCHROMIA 1+ METABOLIC PANEL, COMPREHENSIVE  Collection Time: 03/01/21  5:00 AM  
 Result Value Ref Range Sodium 139 136 - 145 mmol/L Potassium 3.5 3.5 - 5.1 mmol/L Chloride 103 97 - 108 mmol/L  
 CO2 27 21 - 32 mmol/L Anion gap 9 5 - 15 mmol/L Glucose 82 65 - 100 mg/dL BUN 17 6 - 20 MG/DL Creatinine 1.26 (H) 0.55 - 1.02 MG/DL  
 BUN/Creatinine ratio 13 12 - 20 GFR est AA 50 (L) >60 ml/min/1.73m2 GFR est non-AA 42 (L) >60 ml/min/1.73m2 Calcium 7.7 (L) 8.5 - 10.1 MG/DL Bilirubin, total 0.8 0.2 - 1.0 MG/DL  
 ALT (SGPT) 12 12 - 78 U/L  
 AST (SGOT) 14 (L) 15 - 37 U/L Alk. phosphatase 175 (H) 45 - 117 U/L Protein, total 4.8 (L) 6.4 - 8.2 g/dL Albumin 1.8 (L) 3.5 - 5.0 g/dL Globulin 3.0 2.0 - 4.0 g/dL A-G Ratio 0.6 (L) 1.1 - 2.2 Medications reviewed Current Facility-Administered Medications Medication Dose Route Frequency  0.9% sodium chloride infusion 250 mL  250 mL IntraVENous PRN  
 arformoteroL (BROVANA) neb solution 15 mcg  15 mcg Nebulization BID RT  
 budesonide (PULMICORT) 500 mcg/2 ml nebulizer suspension  500 mcg Nebulization BID RT  
 montelukast (SINGULAIR) tablet 10 mg  10 mg Oral QHS  meropenem (MERREM) 500 mg in sterile water (preservative free) 10 mL IV syringe  0.5 g IntraVENous Q8H  
 [Held by provider] dilTIAZem IR (CARDIZEM) tablet 30 mg  30 mg Oral TIDAC  PHENYLephrine (IVÁN-SYNEPHRINE) 30 mg in 0.9% sodium chloride 250 mL infusion   mcg/min IntraVENous TITRATE  nystatin (MYCOSTATIN) 100,000 unit/mL oral suspension 500,000 Units  500,000 Units Oral QID AFTER MEALS  
 amiodarone (CORDARONE) tablet 200 mg  200 mg Oral DAILY  [Held by provider] metoprolol tartrate (LOPRESSOR) tablet 25 mg  25 mg Oral Q6H  
 insulin lispro (HUMALOG) injection   SubCUTAneous AC&HS  
 glucose chewable tablet 16 g  4 Tab Oral PRN  
 dextrose (D50W) injection syrg 12.5-25 g  25-50 mL IntraVENous PRN  
 glucagon (GLUCAGEN) injection 1 mg  1 mg IntraMUSCular PRN  
  pantoprazole (PROTONIX) 40 mg in 0.9% sodium chloride 10 mL injection  40 mg IntraVENous Q12H  
 sodium chloride (NS) flush 5-40 mL  5-40 mL IntraVENous Q8H  
 sodium chloride (NS) flush 5-40 mL  5-40 mL IntraVENous PRN  
 acetaminophen (TYLENOL) tablet 650 mg  650 mg Oral Q6H PRN Or  
 acetaminophen (TYLENOL) suppository 650 mg  650 mg Rectal Q6H PRN  polyethylene glycol (MIRALAX) packet 17 g  17 g Oral DAILY PRN  
 bisacodyL (DULCOLAX) suppository 10 mg  10 mg Rectal DAILY PRN  
 ondansetron (ZOFRAN) injection 4 mg  4 mg IntraVENous Q6H PRN  
 0.9% sodium chloride infusion 250 mL  250 mL IntraVENous PRN  
 apixaban (ELIQUIS) tablet 5 mg  5 mg Oral BID Care Plan discussed with: Patient and Nurse Total time spent with patient: 30 minutes.  
Corwin Hancock MD

## 2021-03-01 NOTE — PROCEDURES
[address] Marloniss Rigo. Alejandro Officer, [address] [place] [address] 
[phone] 2021 M2A capsule endosocopy Procedure Note Jasmyn Duncan : 1945 [facility] Record Number: [id number] Indications:    Iron deficiency anemia Referring Physician:  Livia Kim MD 
Interpreting provider:  Dr. Manjinder Marie Complications:  None Estimated Blood Loss:  None Permit: The indications, risks, benefits and alternatives were reviewed with the patient or their decision maker who was provided an opportunity to ask questions and all questions were answered. The specific risks of M2A capsule endoscopy including capsule failure to transmit, recorder failure to capture, capsule retention requiring radiographic study or operative intervention, radio signal interference with medical devices, and medical devices interference with M2A signal recording. Alternatives to M2A capsule endoscopy including radiographic imaging, observation without testing, or laboratory testing were reviewed as well as the limitations of those alternatives discussed. After considering the options and having all their questions answered, the patient or their decision maker provided both verbal and written consent to proceed. Time stamps: 
First gastric image:  0:00:34 First duodenal image: 0:14:35 First cecal image: 02:09:09 Findings: 
The video was observed and time stamps recorded. Important images saved to permanent report on endoscopy computer. No active hemorrhage noted. 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 No mass lesion or other findings. Recommendations: 
Iron supplementation PO / IV as needed for control of anemia. I see plans for hospice evaluation/family meeting. No plans for additional GI testing. Thank you for entrusting me with this patient's care. Please do not hesitate to contact me with any questions or if I can be of assistance with any of your other patients' GI needs. Signed By: Noe Narayan MD 
                      March 1, 2021

## 2021-03-01 NOTE — PROGRESS NOTES
Palliative Medicine Consult Vin: [phone] ([phone]) Patient Name: Colletta Hummingbird YOB: 1945 Date of Initial Consult: 2/26/2021 Reason for Consult: [address] discussion Requesting Provider: Dr. Jennifer Agustin Primary Care Physician: Thalia De La Rosa MD 
 
 SUMMARY:  
Colletta Hummingbird is a 70 y.o. with a past history of DM, MI, CHF, HTN, COPD on 3 L NC, DVT, A. fib on Eliquis, anemia of unknown etiology, obesity, CAD, PAD, sleep apnea with CPAP and history of bowel resection. Patient presented to the ER from [facility], with CC of shortness of breath x1 day, a hemoglobin of 6.8 and an abnormal CXR. In the ER patient patients BP was soft. She was tachycardic and in A. fib, started on amiodarone drip. She was also requiring slightly more O2 support than baseline, with 4 L nasal cannula. . In ER her hemoglobin was actually 7.2, however she was FOBT positive. Labs also significant for leukocytosis, mildly elevated creatinine and albumin 2.4. CXR significant for cardiomegaly and small bilateral pleural effusions and mild pulmonary edema. Patient transfused 1 unit PRBCs and was admitted on 2/22/2021 f with a diagnosis of GI bleed. Chart reviewed- 
 
Palliative medicine team has not met with  Ms. Noe Davis before today, however this is her 6th hospitalization over the past 12 months. 
 
-3/233/31/2020- SIRS due to lower extremity cellulitis 
-5/296/2/2020-sepsis 2/2 UTI, +kleb and Ecoli ESBL. -9/2910/12/2020 for syncope and collapse, likely due to hypotension from anemia. -11/211/6/2020 with sepsis secondary to LLE cellulitis 1/262/9/2021 with anemia, acute on chronic CHF and PNA with associated respiratory failure. - 1/11/15/2021 with left lobe PNA, GI bleed and A. fib with RVR. Course of hospitalization:   Initially had ongoing issues with PAF requiring amiodarone drip and digoxin, was considering possible pacemaker and AV node ablation however on 2/24 she converted to NSR and pacer put on hold. Patient anemic however hemoglobin fairly stable, has not required additional transfusions. On 2/26 she underwent GI PillCam, results pending. 2/26 UA positive UTI. Current medical issues leading to Palliative Medicine involvement include: GOC Discussion PALLIATIVE DIAGNOSES:  
 
1. Weakness, generalized 2. Debility 3. Poor nutrition 4. Hypoalbuminemia 5. DNR discussion 6. Goals of care discussion PLAN:  
1. Prior to visit I spoke with patient's nurse Chris Jimenez RN. 2. Patient was seen, no family at bedside. 3. Patient did not recall me from initial visit on 2/26. 4. Attempted to assess patients understanding of hospitalization. She did not volunteer much information, she struggled with open ended questions, needed prompting. I have concerns regarding her understanding of hospitalization, seems to have poor insight. 5.  
6. Weakness/generalized-patient indicated she was not at her baseline, stated prior to most recent admission she had been at [address] [place] [address] rehab, where she had just started to be able to stand and pivot to wheel chair with assist. Patient is extrememly weak, increased sleeping etc, very unlikely she has maintained  pre hospitalization level of functioning. Patient indicated that she would like to return to [facility] to continue therapy. I placed order for PT and OT consult to evaluate patient and devise treatment plan. 7. Discussed poor intake and severe hypoalbuminemia- with albumin 1.9. Discussed impat of poor nutrition and hypoalbuminemia on wound healing, energy, fluid retention etc. Patient stated that she does not have an appetite, however she is agreeable to chocolate nutritional drinks, however she requests that they be cold. It may be helpful if staff assists patient with setup of the cold  chocolate drink, I doubt  I think she may need help opening such drinks etc.  
 
8. DNR discussion-patient continues to have a full CODE STATUS at this time. 9. Goals of care discussion- We talked about pill cam with unrevealing results and her continued functional decline despite 6 hospitalizations over past 12 months. Patient  acknowledged that she is weaker than she had been a year ago, however she denied significant debility. We briefly discussed possibility of Hospice, however patient adamantly declined, stated that she does NOT want hospice. 10. Again I question the patients insight and her capacity to weigh benefits vs burdens of treatments and interventions. The patient agreed for me to speak with her daughter, so I will reach out to her  tomorrow 3/2, assess daughters  understanding of hospitalization and discuss GOC. Tanvi Gibbs 11. Initial consult note routed to primary continuity provider and/or primary health care team members 12. Communicated plan of care with: Palliative IDTTracy 192 Team, Brittany AJ 
 
 GOALS OF CARE / TREATMENT PREFERENCES:  
 
GOALS OF CARE: 
Patient/Health Care Proxy Stated Goals: Cure TREATMENT PREFERENCES:  
Code Status: Full Code Advance Care Planning: 
[x] The [facility] Interdisciplinary Team has updated the ACP Navigator with [address] and Patient Capacity Primary Decision MakerShira Truong - Daughter - [phone] Advance Care Planning 2/25/2021 Patient's Healthcare Decision Maker is: -  
Primary Decision Maker Name -  
 Confirm Advance Directive None Patient Would Like to Complete Advance Directive No  
Does the patient have other document types - Medical Interventions: Full interventions Other Instructions: Other: As far as possible, the palliative care team has discussed with patient / health care proxy about goals of care / treatment preferences for patient. HISTORY:  
 
History obtained from: medical records/ nurse/ patient CHIEF COMPLAINT: Not hungry HPI/SUBJECTIVE: The patient is:  
[x] Verbal and participatory [] Non-participatory due to:  
Patient acknowledged poor intake  stated that she is just not hungry, however was agreeable to Cold or chilled chocolate shakes. Patient denied pain, acknowledged fatigue/  
 
 
2/26- CXR increasing pulmonary edema and persistent  left pleural effusion and  LL atelectaisis 2/27- Back in afib, increased SOB, temp 101.1, repeat cultures pending. CXR +cardiomegaly, +Mild pulmonary edema and possible left pleural effusion without significant change. 2/28- Hypotensive, jimenez started. Urine cx + gram neg, started on Merrem. Hgb down 7.3, will need transfusion. Pulmonal reconsulted, patient w/ mod to severe COPD, hypotension 2/2 sepsis, cardiac dx, anemia, now in afib with rvr and severe hypoalbuminemia.  with volume overload 2/29- Maxed on jimenez, patient poor intake, severe hypoalbuminemia, 3L nasal canula, Pill cam resulted, + multiple AVMs, however no active bleeding . Clinical Pain Assessment (nonverbal scale for severity on nonverbal patients):  
Clinical Pain Assessment Severity: 1 Location: throat Character: sore, dry, \"scratchy\" Duration: 1 day Effect: difficult to talk Factors: unknown Frequency: unknown Activity (Movement): Lying quietly, normal position Duration: for how long has pt been experiencing pain (e.g., 2 days, 1 month, years) Frequency: how often pain is an issue (e.g., several times per day, once every few days, constant) FUNCTIONAL ASSESSMENT:  
 
Palliative Performance Scale (PPS): PPS: 30 
 
 
 PSYCHOSOCIAL/SPIRITUAL SCREENING:  
 
Palliative IDT has assessed this patient for cultural preferences / practices and a referral made as appropriate to needs (Cultural Services, Patient Advocacy, Ethics, etc.) Any spiritual / Zoroastrianism concerns: 
[] Yes /  [x] No 
 
Caregiver Burnout: 
[] Yes /  [] No /  [x] No Caregiver Present Anticipatory grief assessment:  
[x] Normal  / [] Maladaptive ESAS Anxiety: Anxiety: 0 
 
ESAS Depression: Depression: 0 REVIEW OF SYSTEMS:  
 
Positive and pertinent negative findings in ROS are noted above in HPI. The following systems were [x] reviewed / [] unable to be reviewed as noted in HPI Other findings are noted below. Systems: constitutional, ears/nose/mouth/throat, respiratory, gastrointestinal, genitourinary, musculoskeletal, integumentary, neurologic, psychiatric, endocrine. Positive findings noted below. Modified ESAS Completed by: provider Fatigue: 10 Drowsiness: 8 Depression: 0 Pain: 1 Anxiety: 0 Nausea: 0 Anorexia: 0 Dyspnea: 4 Stool Occurrence(s): 1 PHYSICAL EXAM:  
 
From RN flowsheet: 
Wt Readings from Last 3 Encounters:  
03/01/21 242 lb 1 oz (109.8 kg) 02/15/21 232 lb (105.2 kg) 02/08/21 254 lb (115.2 kg) Blood pressure (!) 115/58, pulse 86, temperature 98.8 °F (37.1 °C), resp. rate 14, height 5' 7\" (1.702 m), weight 242 lb 1 oz (109.8 kg), SpO2 99 %. Pain Scale 1: Numeric (0 - 10) Pain Intensity 1: 0 Pain Onset 1: acute Pain Location 1: Abdomen Pain Orientation 1: Right Pain Description 1: Aching Pain Intervention(s) 1: Medication (see MAR) Last bowel movement, if known:  
 
Constitutional: Appears stated age, large body habitus, extreme fatigue, weakness, NAD Eyes: pupils equal, anicteric, watery ENMT: no nasal discharge, very dry mucous membranes Cardiovascular: regular rhythm, distal pulses intact Respiratory: mildly labored, symmetric, pursed lip breathing Gastrointestinal: large, soft non-tender, +bowel sounds Musculoskeletal:  no deformity, no tenderness to palpation Skin: warm, dry, edema in upper and lower extremities Neurologic: Drowsy, difficulty keeping eyes open, weak, is able to follow commands, moving all extremities Psychiatric: appropriate  affect, no hallucinations Other: 
 
 
 HISTORY:  
 
Active Problems: 
  Type II diabetes mellitus ([address] (10/9/2015) Essential hypertension (1/22/2016) Atrial fibrillation ([address] (11/16/2018) COPD (chronic obstructive pulmonary disease) ([facility] (7/13/2019) Acute on chronic systolic CHF (congestive heart failure) ([facility] (1/26/2021) Elevated brain natriuretic peptide (BNP) level (2/23/2021) Acute blood loss anemia (2/23/2021) Dyspnea (2/23/2021) GIB (gastrointestinal bleeding) (2/23/2021) Symptomatic anemia (2/23/2021) Past Medical History:  
Diagnosis Date  Asthma  Atrial fibrillation ([address] 11/16/2018  Autoimmune disease ([address]   
 fibromyalgia  CAD (coronary artery disease) 10/9/2015  Closed wedge compression fracture of T7 vertebra ([address] 11/13/2018  COPD (chronic obstructive pulmonary disease) (HCC)  Diabetes ([address]  DVT (deep vein thrombosis) in pregnancy  GERD (gastroesophageal reflux disease)  Heart failure ([address]  History of vascular access device 09/30/2020  
 4f midline, 12cm at 1cm out placed in L Cephalic by Анна Nguyen RN  
 HTN (hypertension)  NSTEMI (non-ST elevated myocardial infarction) ([address] 10/9/2015  Obesity (BMI 30-39.9) 11/13/2018  PAD (peripheral artery disease) ([address] prior stenting  Sleep apnea   
 wears CPAP  
 Statin intolerance Past Surgical History:  
Procedure Laterality Date  COLONOSCOPY N/A 1/4/2021 COLONOSCOPY performed by Sunni Jerez MD at [address] [facility]  HX COLOSTOMY    
 and reversal, post episiotomy repair [facility]  HX UROLOGICAL  2009  
 bladder sling  IR KYPHOPLASTY LUMBAR  10/8/2020  SD ABDOMEN SURGERY PROC UNLISTED    
 hital hernia repair, gall bladder removed  SD AMPUTATION TRANSMETACARPAL Right 06/12/2019  
 entire ring finger, 2nd & 3rd fingers (transmetacarpal)  SD BREAST SURGERY PROCEDURE UNLISTED    
 lumpectomy  SD CARDIAC SURG PROCEDURE UNLIST  10/9/15  
 2 stents Family History Problem Relation Age of Onset  Diabetes Mother  Heart Failure Mother  Kidney Disease Mother  Diabetes Father  Heart Disease Father  Elevated Lipids Father  Heart Failure Father  Heart Disease Brother  Cancer Brother   
     kidney  Diabetes Brother  No Known Problems Brother  No Known Problems Brother History reviewed, no pertinent family history. Social History Tobacco Use  Smoking status: Former Smoker Quit date: 3/30/2017 Years since quitting: 3.9  Smokeless tobacco: Never Used Substance Use Topics  Alcohol use: No  
  Alcohol/week: 0.0 standard drinks Comment: very very rarely Allergies Allergen Reactions  Advair Diskus [Fluticasone Propion-Salmeterol] Rash  Aspartame Other (comments)  Breo Ellipta [Fluticasone Furoate-Vilanterol] Rash  Bumex [Bumetanide] Myalgia  Ciprofloxacin Rash  Statins-Hmg-Coa Reductase Inhibitors Other (comments) Muscle pain Lipitor/crestor/zocor  Sulfa (Sulfonamide Antibiotics) Rash  Tetracycline Other (comments) musclepain  Torsemide Rash and Myalgia  Zetia [Ezetimibe] Myalgia Current Facility-Administered Medications Medication Dose Route Frequency  mirtazapine (REMERON) tablet 15 mg  15 mg Oral QHS  ipratropium (ATROVENT) 0.02 % nebulizer solution 0.5 mg  0.5 mg Nebulization QID RT  
 PHENYLephrine (IVÁN-SYNEPHRINE) 30 mg in 0.9% sodium chloride 250 mL infusion   mcg/min IntraVENous TITRATE  
 0.9% sodium chloride infusion 250 mL  250 mL IntraVENous PRN  
 arformoteroL (BROVANA) neb solution 15 mcg  15 mcg Nebulization BID RT  
 budesonide (PULMICORT) 500 mcg/2 ml nebulizer suspension  500 mcg Nebulization BID RT  
 montelukast (SINGULAIR) tablet 10 mg  10 mg Oral QHS  meropenem (MERREM) 500 mg in sterile water (preservative free) 10 mL IV syringe  0.5 g IntraVENous Q8H  
 [Held by provider] dilTIAZem IR (CARDIZEM) tablet 30 mg  30 mg Oral TIDAC  nystatin (MYCOSTATIN) 100,000 unit/mL oral suspension 500,000 Units  500,000 Units Oral QID AFTER MEALS  
 amiodarone (CORDARONE) tablet 200 mg  200 mg Oral DAILY  [Held by provider] metoprolol tartrate (LOPRESSOR) tablet 25 mg  25 mg Oral Q6H  
 insulin lispro (HUMALOG) injection   SubCUTAneous AC&HS  
 glucose chewable tablet 16 g  4 Tab Oral PRN  
 dextrose (D50W) injection syrg 12.5-25 g  25-50 mL IntraVENous PRN  
 glucagon (GLUCAGEN) injection 1 mg  1 mg IntraMUSCular PRN  pantoprazole (PROTONIX) 40 mg in 0.9% sodium chloride 10 mL injection  40 mg IntraVENous Q12H  
 sodium chloride (NS) flush 5-40 mL  5-40 mL IntraVENous Q8H  
 sodium chloride (NS) flush 5-40 mL  5-40 mL IntraVENous PRN  
 acetaminophen (TYLENOL) tablet 650 mg  650 mg Oral Q6H PRN Or  
 acetaminophen (TYLENOL) suppository 650 mg  650 mg Rectal Q6H PRN  polyethylene glycol (MIRALAX) packet 17 g  17 g Oral DAILY PRN  
 bisacodyL (DULCOLAX) suppository 10 mg  10 mg Rectal DAILY PRN  
 ondansetron (ZOFRAN) injection 4 mg  4 mg IntraVENous Q6H PRN  
 0.9% sodium chloride infusion 250 mL  250 mL IntraVENous PRN  
 apixaban (ELIQUIS) tablet 5 mg  5 mg Oral BID  
 
 
 
 LAB AND IMAGING FINDINGS:  
 
Lab Results Component Value Date/Time WBC 11.7 (H) 03/01/2021 05:12 PM  
 HGB 8.4 (L) 03/01/2021 05:12 PM  
 PLATELET 972 10/00/0338 05:12 PM  
 
Lab Results Component Value Date/Time Sodium 139 03/01/2021 05:00 AM  
 Potassium 3.5 03/01/2021 05:00 AM  
 Chloride 103 03/01/2021 05:00 AM  
 CO2 27 03/01/2021 05:00 AM  
 BUN 17 03/01/2021 05:00 AM  
 Creatinine 1.26 (H) 03/01/2021 05:00 AM  
 Calcium 7.7 (L) 03/01/2021 05:00 AM  
 Magnesium 2.2 02/23/2021 09:19 PM  
 Phosphorus 2.2 (L) 01/03/2021 01:59 AM  
  
Lab Results Component Value Date/Time Alk. phosphatase 175 (H) 03/01/2021 05:00 AM  
 Protein, total 4.8 (L) 03/01/2021 05:00 AM  
 Albumin 1.8 (L) 03/01/2021 05:00 AM  
 Globulin 3.0 03/01/2021 05:00 AM  
 
Lab Results Component Value Date/Time INR 1.1 02/22/2021 10:46 PM  
 Prothrombin time 11.1 02/22/2021 10:46 PM  
 aPTT 26.1 02/22/2021 10:46 PM  
  
Lab Results Component Value Date/Time Iron 24 (L) 02/25/2021 05:45 AM  
 TIBC 217 (L) 02/25/2021 05:45 AM  
 Iron % saturation 11 (L) 02/25/2021 05:45 AM  
 Ferritin 105 02/25/2021 05:45 AM  
  
No results found for: PH, PCO2, PO2 No components found for: [place] Lab Results Component Value Date/Time CK 18 (L) 05/21/2019 06:13 AM  
 CK - MB 1.1 11/13/2018 03:26 PM  
  
 
 
   
 
Total time: 35 min Counseling / coordination time, spent as noted above: 25 min 
> 50% counseling / coordination?: yes Prolonged service was provided for  []30 min   []75 min in face to face time in the presence of the patient, spent as noted above. Time Start:  
Time End:  
Note: this can only be billed with 39800 (initial) or 76037 (follow up). If multiple start / stop times, list each separately.

## 2021-03-01 NOTE — PROGRESS NOTES
Transition Plan of Care-Bundle Patient RUR 52% 11:11 AM 
Hospice consult needed. Please put consult in. ERIC Torres Plan: 1. Monitor patient's response to treatment. 2.   Feeling a little better today in general.  Less sleepy. She is not moving about much - encouraged her to work with PT/OT. 3. BP low - still on pressor. Dilt and Metoprolol being held. 4. Patient is considering Hospice-info session ordered. Patient has been depressed and tearful. 5. Patient uses Home O2 at baseline. 6. Patient is from [facility] plan to return when stable. 7. Will need 2 negative COVID tests prior to return to [facility],  Covid neg 2/23 will accept rapid test as second. 8. [facility] transport needed more than likely. 9. Case Management to follow.  
 
ERIC Torres

## 2021-03-01 NOTE — PROGRESS NOTES
[facility] at [place] [address] [address] [facility], [address] [place] [address] W: [phone]  F: [phone] Reason for Visit:  
Giuseppe Mehta is a 70 y.o. female who is seen for follow up of anemia Interval History: She received another unit of pRBC over the weekend. Capsule endoscopy completed Friday and results pending. Febrile over the weekend. Today she tells me she is feeling a bit better. Denies bleeding. PAST HISTORY: The following sections were reviewed and updated in the EMR as appropriate: PMH, SH, FH, Medications, Allergies. Allergies Allergen Reactions  Advair Diskus [Fluticasone Propion-Salmeterol] Rash  Aspartame Other (comments)  Breo Ellipta [Fluticasone Furoate-Vilanterol] Rash  Bumex [Bumetanide] Myalgia  Ciprofloxacin Rash  Statins-Hmg-Coa Reductase Inhibitors Other (comments) Muscle pain Lipitor/crestor/zocor  Sulfa (Sulfonamide Antibiotics) Rash  Tetracycline Other (comments) musclepain  Torsemide Rash and Myalgia  Zetia [Ezetimibe] Myalgia Review of Systems: A complete review of systems was obtained, reviewed. Pertinent findings reviewed above. Physical Exam:  
 
Visit Vitals BP (!) 94/50 Pulse 85 Temp 98.1 °F (36.7 °C) Resp 19 Ht 5' 7\" (1.702 m) Wt 242 lb 1 oz (109.8 kg) SpO2 98% BMI 37.91 kg/m² General: no distress, frail and ill appearing Respiratory: normal respiratory effort CV: LE peripheral edema Skin: no rashes; no ecchymoses; no petechiae Psych: alert, oriented, normal mood/affect Results:  
 
Lab Results Component Value Date/Time WBC 12.6 (H) 03/01/2021 05:00 AM  
 HGB 8.4 (L) 03/01/2021 05:00 AM  
 HCT 27.9 (L) 03/01/2021 05:00 AM  
 PLATELET 938 46/08/1031 05:00 AM  
 MCV 96.9 03/01/2021 05:00 AM  
 ABS.  NEUTROPHILS 9.0 (H) 03/01/2021 05:00 AM  
 Hemoglobin (POC) 13.9 10/09/2015 12:27 PM  
 Hematocrit (POC) 26 (L) 08/01/2019 11:41 AM  
 
Lab Results Component Value Date/Time Sodium 139 03/01/2021 05:00 AM  
 Potassium 3.5 03/01/2021 05:00 AM  
 Chloride 103 03/01/2021 05:00 AM  
 CO2 27 03/01/2021 05:00 AM  
 Glucose 82 03/01/2021 05:00 AM  
 BUN 17 03/01/2021 05:00 AM  
 Creatinine 1.26 (H) 03/01/2021 05:00 AM  
 GFR est AA 50 (L) 03/01/2021 05:00 AM  
 GFR est non-AA 42 (L) 03/01/2021 05:00 AM  
 Calcium 7.7 (L) 03/01/2021 05:00 AM  
 Sodium (POC) 139 08/01/2019 11:41 AM  
 Potassium (POC) 5.2 (H) 08/01/2019 11:41 AM  
 Chloride (POC) 109 (H) 08/01/2019 11:41 AM  
 Glucose (POC) 100 02/28/2021 10:29 PM  
 BUN (POC) 22 (H) 08/01/2019 11:41 AM  
 Creatinine (POC) 1.1 08/01/2019 11:41 AM  
 Calcium, ionized (POC) 1.14 08/01/2019 11:41 AM  
 
Lab Results Component Value Date/Time Bilirubin, total 0.8 03/01/2021 05:00 AM  
 ALT (SGPT) 12 03/01/2021 05:00 AM  
 Alk. phosphatase 175 (H) 03/01/2021 05:00 AM  
 Protein, total 4.8 (L) 03/01/2021 05:00 AM  
 Albumin 1.8 (L) 03/01/2021 05:00 AM  
 Globulin 3.0 03/01/2021 05:00 AM  
 
Lab Results Component Value Date/Time Reticulocyte count 5.4 (H) 02/24/2021 05:52 AM  
 Iron % saturation 11 (L) 02/25/2021 05:45 AM  
 TIBC 217 (L) 02/25/2021 05:45 AM  
 Ferritin 105 02/25/2021 05:45 AM  
 Vitamin B12 597 02/24/2021 05:52 AM  
 Folate 13.6 02/25/2021 05:45 AM  
 Haptoglobin 65 02/25/2021 05:45 AM  
  (H) 02/25/2021 05:45 AM  
 Sed rate, automated 35 (H) 11/02/2020 04:26 PM  
 C-Reactive protein 5.04 (H) 01/27/2021 01:58 AM  
 TSH 3.40 01/13/2021 11:29 AM  
 Lipase 334 06/27/2018 10:49 AM  
 Hep C virus Ab Interp. NONREACTIVE 05/30/2020 12:31 AM  
 
Lab Results Component Value Date/Time INR 1.1 02/22/2021 10:46 PM  
 aPTT 26.1 02/22/2021 10:46 PM  
 D-dimer 1.77 (H) 01/27/2021 01:58 AM  
 Fibrinogen 425 01/27/2021 01:58 AM  
 
Lab Results Component Value Date/Time  (H) 02/25/2021 05:45 AM  
 
 
1/1/21 Peripheral smear Normocytic anemia with moderate anisopoikilocytosis, polychromasia, and scattered nucleated red blood cells; no increase in schistocytes identified Leukocytosis comprised predominantly of mature neutrophils with toxic granularity Thrombocytosis Stool blood 1/1/2021: positive Stool blood 2/22/2021: positive 1/4/21 EGD/Colonoscopy/ Dr Eber Lovett Impression: 
Candida esophagitis, gastritis, duodenitis, colon polyps, diverticulosis. Antral mass likely a benign lipoma 
  
 
2/25/21 CT A/P w/wo contrast 
1. No evidence of active GI bleeding. No evidence of acute process in the 
abdomen or pelvis. 2. Moderate left and small right pleural effusions with overlying lower lobe 
subsegmental atelectasis. Pulmonary interstitial and alveolar edema. 3. Left inguinal postoperative changes involving the left femoral vessels, with 
a small superficial fluid collection overlying the left femoral vessels 
measuring 2.1 x 1.4 cm. Correlate with history. Consider ultrasound to evaluate 
for pseudoaneurysm and/or hematoma. 4. Nonspecific subcutaneous edema in the left lateral abdominal wall. Assessment/Recommendations:  
 
1. Anemia, normocytic S/p 2 units pRBC This appears to be secondary to recurring episodes of blood loss, based on the elevated retic and the repeated positive stool blood tests. No strong evidence for hemolysis, and other labs fairly unremarkable. She received venofer 100mg IV on 1/2/2021 and 200mg IV on 2/25/2021 and 2/27/2021. I will give one more dose today. The source of blood loss is not clear. EGD and colonoscopy unrevealing. No bleeding on CTA. Capsule endoscopy was completed on 2/16/2021 and results are pending. For now, monitor her HGB and transfuse PRN. 2. Abdominal pain, Diarrhea, Nausea Uncertain etiology. CT without a cause. GI following. Camera pill pending. 3. Afib On apixaban. Cardiology and GI have discussed risks/benefits and made the decision to resume therapy. 4. Fever / UTI On IV abx. Urine growing E coli.  
 
Signed By: Amelia Garcia MD

## 2021-03-02 NOTE — CONSULTS
ORTHO CONSULT NOTE Date of Consultation:  March 2, 2021 Referring Physician:  Grecia Martínez CC: right middle finger wound HPI:  Pa Simeon is a 70 y.o. female extensive PMH incl AFib (Eliquis), CHF, COPD, DM, PAD, and right finger amputations 2019 who was admitted 2/22/2021 for SOB. Per current chart review, she remains on pressors for hypotension from urosepsis, anemia, and cardiac disease. Ortho is asked to eval right hand wound. Per patient, the middle finger skin never healed after her partial finger amputation. She reports tenderness but denies drainage. She denies problems with other digits. It appears in April 2019, she developed RUE embolism from AFib despite Xarelto and Plavix. She required thrombectomy of brachial, radial, and ulnar arteries with vascular surgery [facility]. She then developed gangrene of her right hand index, middle, and ring fingers requiring partial amputation of index/middle fingers and complete amputation of ring finger June 2019. Past Medical History:  
Diagnosis Date  Asthma  Atrial fibrillation ([address] 11/16/2018  Autoimmune disease ([address]   
 fibromyalgia  CAD (coronary artery disease) 10/9/2015  Closed wedge compression fracture of T7 vertebra ([address] 11/13/2018  COPD (chronic obstructive pulmonary disease) (HCC)  Diabetes ([address]  DVT (deep vein thrombosis) in pregnancy  GERD (gastroesophageal reflux disease)  Heart failure ([address]  History of vascular access device 09/30/2020  
 4f midline, 12cm at 1cm out placed in L Cephalic by Sudhir Au RN  
 HTN (hypertension)  NSTEMI (non-ST elevated myocardial infarction) ([address] 10/9/2015  Obesity (BMI 30-39.9) 11/13/2018  PAD (peripheral artery disease) ([address] prior stenting  Sleep apnea   
 wears CPAP  
 Statin intolerance Past Surgical History:  
Procedure Laterality Date  COLONOSCOPY N/A 1/4/2021 COLONOSCOPY performed by Kaylie Cisse MD at [address] [place] [address] • HX  SECTION    
• HX COLOSTOMY    
 and reversal, post episiotomy repair  
• HX HYSTERECTOMY    
• HX UROLOGICAL  2009  
 bladder sling  
• IR KYPHOPLASTY LUMBAR  10/8/2020  
• [place] ABDOMEN SURGERY PROC UNLISTED    
 hital hernia repair, gall bladder removed  
• [place] AMPUTATION TRANSMETACARPAL Right 2019  
 entire ring finger, 2nd & 3rd fingers (transmetacarpal)  
• [place] BREAST SURGERY PROCEDURE UNLISTED    
 lumpectomy  
• [place] CARDIAC SURG PROCEDURE UNLIST  10/9/15  
 2 stents  
  
Family History  
Problem Relation Age of Onset  
• Diabetes Mother   
• Heart Failure Mother   
• Kidney Disease Mother   
• Diabetes Father   
• Heart Disease Father   
• Elevated Lipids Father   
• Heart Failure Father   
• Heart Disease Brother   
• Cancer Brother   
     kidney  
• Diabetes Brother   
• No Known Problems Brother   
• No Known Problems Brother   
  
Social History  
 
Tobacco Use  
• Smoking status: Former Smoker  
  Quit date: 3/30/2017  
  Years since quitting: 3.9  
• Smokeless tobacco: Never Used  
Substance Use Topics  
• Alcohol use: No  
  Alcohol/week: 0.0 standard drinks  
  Comment: very very rarely  
 
Allergies  
Allergen Reactions  
• Advair Diskus [Fluticasone Propion-Salmeterol] Rash  
• Aspartame Other (comments)  
• Breo Ellipta [Fluticasone Furoate-Vilanterol] Rash  
• Bumex [Bumetanide] Myalgia  
• Ciprofloxacin Rash  
• Statins-Hmg-Coa Reductase Inhibitors Other (comments)  
  Muscle pain 
Lipitor/crestor/zocor  
• Sulfa (Sulfonamide Antibiotics) Rash  
• Tetracycline Other (comments)  
  musclepain  
• Torsemide Rash and Myalgia  
• Zetia [Ezetimibe] Myalgia  
  
 
Review of Systems:  Per HPI. 
 
Objective:  
 
Patient Vitals for the past 8 hrs: 
 BP Temp Pulse Resp SpO2  
21 1130 (!) 108/45 — 81 23 100 %  
21 1116 (!) 115/40 — 82 26 100 %  
21 1108 (!) 117/51 98 °F (36.7 °C) 80 17 100 %  
21 1100 (!) 122/49 — 81 18 100 %  
 21 1045 (!) 124/52  80 17 100 % 21 1030 (!) 117/56  77 19 99 % 21 1029   77    
21 1016 109/65  (!) 118 25 99 % 21 1000 (!) 125/91  (!) 110 22 98 % 21 0945 (!) 122/48  (!) 127 17 98 % 21 0930 130/70  (!) 119 16 97 % 21 0915 (!) 113/54  (!) 112 20 99 % 21 0900 (!) 114/51  (!) 123 24 99 % 21 0845 107/61  (!) 109 23 100 % 21 0830 (!) 151/73 98.4 °F (36.9 °C) (!) 124 22 97 % 21 0815 (!) 114/102  (!) 110 22 99 % 21 0800 130/61  (!) 115 23 100 % 21 0745 122/81  (!) 107 19 100 % 21 0730 (!) 155/67  (!) 123 22 98 % 21 0700   (!) 122    
21 0645 (!) 154/46  87 15 97 % 21 0630 (!) 143/48  87 17 99 % 21 0615 (!) 136/50  85 16 99 % 21 0600 (!) 117/41  85 20 98 % 21 0552 (!) 137/41      
21 0545   89 (!) 33 99 % 21 0530   85 18 98 % Temp (24hrs), Av.8 °F (37.1 °C), Min:97.7 °F (36.5 °C), Max:99.8 °F (37.7 °C) EXAM:  
Awake and watching TV. Slow to respond to my questions and difficulty following commands. LUE no obvious open wound. Strong radial pulse, digit CR < 2 secs. Good motor of hand/wrist. SILT digits. RUE previous partial amputation of index and middle fingers with complete amputation of ring finger. Surgical incisions well healed except there is small edmundo eschar on ulnar side of middle finger with 1cm erythema. No obvious active drainage. Erythema does not extend proximally in finger or into palm. Middle finger stump is TTP. Volar and dorsal finger/hand NTTP. Very limited AROM middle/index fingers but moves thumb and small finger OK. Weak but palp radial/ulnar pulses. CR seems < 2 secs but slower than LUE. Hand SILT. Imaging Review:  
Right hand xrays 2021: 
 Three views of the right third finger demonstrate mid phalangeal amputation. There is diffuse soft tissue swelling. There is no associated periosteal reaction. Incidental note is made of middle phalangeal amputation of the second digit and fourth phalangeal amputation. Labs: WBC 11.5. BUN 14, Cr 1.12, CrCl 56. Urine culture 2/24 E coli, ESBL. Blood cultures 2/27 NGTD. Impression:  
 
Patient Active Problem List  
 Diagnosis Date Noted  Elevated brain natriuretic peptide (BNP) level 02/23/2021  Acute blood loss anemia 02/23/2021  Dyspnea 02/23/2021  GIB (gastrointestinal bleeding) 02/23/2021  Symptomatic anemia 02/23/2021  Pneumonia 01/26/2021  
 SOB (shortness of breath) 01/26/2021  Anemia 01/26/2021  Acute on chronic systolic CHF (congestive heart failure) ([facility] 01/26/2021  Uncontrolled type 2 diabetes mellitus with hyperglycemia ([facility] 01/26/2021  Left lower lobe pneumonia 01/01/2021  Leukocytosis 11/03/2020  Chronic respiratory failure with hypoxia ([address] 11/02/2020  Cellulitis of lower extremity 03/23/2020  ASO (arteriosclerosis obliterans) 09/05/2019  PVD (peripheral vascular disease) ([address] 08/01/2019  Severe obesity ([facility] 07/30/2019  COPD (chronic obstructive pulmonary disease) ([address] 07/13/2019  Normocytic anemia 07/13/2019  PAD (peripheral artery disease) ([facility] 07/13/2019  Mild intermittent asthma 05/17/2019  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 
  Type II diabetes mellitus ([address] (10/9/2015) Essential hypertension (1/22/2016) Atrial fibrillation ([facility] (11/16/2018) COPD (chronic obstructive pulmonary disease) ([facility] (7/13/2019) Acute on chronic systolic CHF (congestive heart failure) ([facility] (1/26/2021) Elevated brain natriuretic peptide (BNP) level (2/23/2021) Acute blood loss anemia (2/23/2021) Dyspnea (2/23/2021) GIB (gastrointestinal bleeding) (2/23/2021) Symptomatic anemia (2/23/2021) Chronic wound right middle finger stump s/p partial amputation 2019. PAD. The right hand problem does not appear significant enough to be contributing to her sepsis/hypotension at this time. Plan:  
I believe the patient needs additional vasc work-up of arterial disease RUE to ensure proper arterial supply to heal this wound. I'll order PVR, arterial dopplers. Consider vasc consult based on results. If patient improves medically, can discharge to home and have studies as outpatient. She needs to F/U as outpatient with Dr. Jules Vang [facility], to determine if non-urgent I&D with revision amputation indicated once stable medically. No indication at this time for urgent Ortho intervention given medical co-morbidities. Please call again if questions. Dr. Yesi Diaz is aware and agrees with above plan. SALIMA Hopson [facility]

## 2021-03-02 NOTE — PROGRESS NOTES
Cardiology Progress Note [address], [address] [facility] Phone [phone]; Fax [phone] 
 
 
 
3/2/2021 9:13 AM  
 
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 Gtt needs to be weaned- current maps in the 76s · Palliative care following - patient wishes to remain a full code and declines hospice at this time Reviewed plan with patient and RN No intake/output data recorded. Last 3 Recorded Weights in this Encounter 21 0358 21 0500 21 0340 Weight: 241 lb 11.2 oz (109.6 kg) 242 lb 1 oz (109.8 kg) 239 lb 11.2 oz (108.7 kg)  1901 -  0700 In: 480 [P.O.:480] Out: 750 [Urine:750] SUBJECTIVE Lorchel Valdezo feels more SOB this AM 
Denies palpitations No chest pain Current Facility-Administered Medications Medication Dose Route Frequency  amiodarone (CORDARONE) 375 mg in dextrose 5% 250 mL infusion  1 mg/min IntraVENous TITRATE  mirtazapine (REMERON) tablet 15 mg  15 mg Oral QHS  ipratropium (ATROVENT) 0.02 % nebulizer solution 0.5 mg  0.5 mg Nebulization QID RT  
  PHENYLephrine (IVÁN-SYNEPHRINE) 100 mg in 0.9% sodium chloride 250 mL infusion   mcg/min IntraVENous TITRATE  
 0.9% sodium chloride infusion 250 mL  250 mL IntraVENous PRN  
 arformoteroL (BROVANA) neb solution 15 mcg  15 mcg Nebulization BID RT  
 budesonide (PULMICORT) 500 mcg/2 ml nebulizer suspension  500 mcg Nebulization BID RT  
 montelukast (SINGULAIR) tablet 10 mg  10 mg Oral QHS  meropenem (MERREM) 500 mg in sterile water (preservative free) 10 mL IV syringe  0.5 g IntraVENous Q8H  
 [Held by provider] dilTIAZem IR (CARDIZEM) tablet 30 mg  30 mg Oral TIDAC  nystatin (MYCOSTATIN) 100,000 unit/mL oral suspension 500,000 Units  500,000 Units Oral QID AFTER MEALS  
 amiodarone (CORDARONE) tablet 200 mg  200 mg Oral DAILY  [Held by provider] metoprolol tartrate (LOPRESSOR) tablet 25 mg  25 mg Oral Q6H  
 insulin lispro (HUMALOG) injection   SubCUTAneous AC&HS  
 glucose chewable tablet 16 g  4 Tab Oral PRN  
 dextrose (D50W) injection syrg 12.5-25 g  25-50 mL IntraVENous PRN  
 glucagon (GLUCAGEN) injection 1 mg  1 mg IntraMUSCular PRN  pantoprazole (PROTONIX) 40 mg in 0.9% sodium chloride 10 mL injection  40 mg IntraVENous Q12H  
 sodium chloride (NS) flush 5-40 mL  5-40 mL IntraVENous Q8H  
 sodium chloride (NS) flush 5-40 mL  5-40 mL IntraVENous PRN  
 acetaminophen (TYLENOL) tablet 650 mg  650 mg Oral Q6H PRN Or  
 acetaminophen (TYLENOL) suppository 650 mg  650 mg Rectal Q6H PRN  polyethylene glycol (MIRALAX) packet 17 g  17 g Oral DAILY PRN  
 bisacodyL (DULCOLAX) suppository 10 mg  10 mg Rectal DAILY PRN  
 ondansetron (ZOFRAN) injection 4 mg  4 mg IntraVENous Q6H PRN  
 0.9% sodium chloride infusion 250 mL  250 mL IntraVENous PRN  
 apixaban (ELIQUIS) tablet 5 mg  5 mg Oral BID OBJECTIVE Intake/Output Summary (Last 24 hours) at 3/2/2021 5092 Last data filed at 3/2/2021 0634 Gross per 24 hour Intake 480 ml Output 400 ml Net 80 ml  
 
 
 Review of Systems - History obtained from the patient AS PER  HPI Telemetry AF v rate 120s PHYSICAL EXAM  
  
 
Visit Vitals BP (!) 154/46 Pulse (!) 122 Temp 99.5 °F (37.5 °C) Resp 15 Ht 5' 7\" (1.702 m) Wt 239 lb 11.2 oz (108.7 kg) SpO2 97% BMI 37.54 kg/m² Gen: Well-developed, obese, ill appearing,   alert and oriented x 3, resting HEENT:  Pink conjunctivae, [place]. No scleral icterus or conjunctival, moist mucous membranes Neck: Supple, CVL Resp: no accessory muscle use, distant BS- difficult exam given body habitus Card: Irregular/accelerated Rate,Rythm,No murmurs, rubs or gallop. No thrills. GI:          soft, non-tender MSK: No cyanosis or clubbing Skin: No rashes or ulcers, ecchymosis Neuro:  Cranial nerves are grossly intact, moving all four extremities, no focal deficit, follows commands appropriately Psych:  fair insight, oriented to person, place and time, alert, flat Affect LE: +3 pitting BLE edema. Erythema/purplish discoloration  to BLE  
  
 
DATA REVIEW Laboratory and Imaging have been reviewed by me and are notable for No results for input(s): CPK, CKMB, TROIQ in the last 72 hours. Recent Labs 03/02/21 
0225 03/01/21 
1712 03/01/21 
0500 02/28/21 
2504   --  139 137  
K 3.5  --  3.5 3.7 CO2 25  --  27 28 BUN 14  --  17 19 CREA 1.12*  --  1.26* 1.38* GLU 79  --  82 98 WBC 11.5* 11.7* 12.6* 13.4* HGB 8.6* 8.4* 8.4* 7.3* HCT 29.0* 28.1* 27.9* 25.6*  
 [phone] Mariano Hamm NP

## 2021-03-02 NOTE — PROGRESS NOTES
Bedside shift change report given to [facility] (oncoming nurse) by Matti Ca (offgoing nurse). Report included the following information SBAR, Kardex, ED Summary, Intake/Output, MAR, Recent Results and Cardiac Rhythm A fib.  
 
0830 Eva at bedside. Per Eva, decrease Roland drip to 45mcg/min and then half the dose again in 15 minutes for the MAP goal of 65. 
 
1035 Telemetry notified of patient converting to NSR. 
 
1630 Pt was taken to X ray. 1710 Telemetry notified of patient converting to A fib. Pt is currently in X ray. 1720 Pt returned from X ray. 1728 Cardiology on call contacted. 1 Dr Kimberley Rivas returned call. New order give scheduled Metoprolol 25 mg now and then give 0.125 Digoxin x 1  IV if HR sustained 120 or above more than 1 Hour. Central line Type: 3 lumen Central Line Insert Date: 2.23.21 Reason Central Line Placed: no iv access Central Line Dressing Date: 3.1.21 Biopatch in place? Yes No: yes Tubing labeled and appropriate? Yes No: yes Alcohol caps on all open ports? Yes No: yes Last CHG bath (time&date): 3.2.21 Reviewed with provider and central line must stay in for the following reasons: No iv access, hemodynamiclly unstable. Bedside shift change report given to Matti Ca (oncoming nurse) by [facility] (offgoing nurse). Report included the following information SBAR, Kardex, ED Summary, Intake/Output, MAR, Med Rec Status and Cardiac Rhythm A fib.

## 2021-03-02 NOTE — PROGRESS NOTES
Palliative Medicine Consult Vin: [phone] ([phone]) Patient Name: Francoise Zuniga YOB: 1945 Date of Initial Consult: 2/26/2021 Reason for Consult: [address] discussion Requesting Provider: Dr. Enrike Ramirez Primary Care Physician: Annetta Mitchell MD 
 
 SUMMARY:  
Francoise Zuniga is a 70 y.o. with a past history of DM, MI, CHF, HTN, COPD on 3 L NC, DVT, A. fib on Eliquis, anemia of unknown etiology, obesity, CAD, PAD, sleep apnea with CPAP and history of bowel resection. Patient presented to the ER from [facility], with CC of shortness of breath x1 day, a hemoglobin of 6.8 and an abnormal CXR. In the ER patient patients BP was soft. She was tachycardic and in A. fib, started on amiodarone drip. She was also requiring slightly more O2 support than baseline, with 4 L nasal cannula. . In ER her hemoglobin was actually 7.2, however she was FOBT positive. Labs also significant for leukocytosis, mildly elevated creatinine and albumin 2.4. CXR significant for cardiomegaly and small bilateral pleural effusions and mild pulmonary edema. Patient transfused 1 unit PRBCs and was admitted on 2/22/2021 f with a diagnosis of GI bleed. Chart reviewed- 
 
Palliative medicine team has not met with  Ms. Jorje Renee before today, however this is her 6th hospitalization over the past 12 months. 
 
-3/233/31/2020- SIRS due to lower extremity cellulitis 
-5/296/2/2020-sepsis 2/2 UTI, +kleb and Ecoli ESBL. -9/2910/12/2020 for syncope and collapse, likely due to hypotension from anemia. -11/211/6/2020 with sepsis secondary to LLE cellulitis 1/262/9/2021 with anemia, acute on chronic CHF and PNA with associated respiratory failure. - 1/11/15/2021 with left lobe PNA, GI bleed and A. fib with RVR. Course of hospitalization:   Initially had ongoing issues with PAF requiring amiodarone drip and digoxin, was considering possible pacemaker and AV node ablation however on 2/24 she converted to NSR and pacer put on hold. Patient anemic however hemoglobin fairly stable, has not required additional transfusions. On 2/26 she underwent GI PillCam, results pending. 2/26 UA positive UTI. Current medical issues leading to Palliative Medicine involvement include: GOC Discussion PALLIATIVE DIAGNOSES:  
 
1. Weakness, generalized 2. Debility 3. Poor nutrition 4. Hypoalbuminemia 5. DNR discussion 6. Goals of care discussion PLAN:  
1. Prior to visit I spoke with patient's Cardiology NP Thao Mendoza 2. Patient was seen, no family at bedside. 3. Patient again did not recall me from prior visits. 4. Weakness/generalized- Patient of advanced age, multiple comorbidities resulting in 6 hospitalizations over past 12 months. Patient has had a significant functional decline during that time, from independent function to most recent level of function of, stand and pivot to a chair with assistance. Patient is very weak, with poor intake. PT/OT attempted evaluation today, however patient refused services, reporting she was too tired. 5. Poor intake and severe hypoalbuminemia- with albumin 1.9. Revisited poor intake and concerns regarding worsening weakness, increased fatigue as well as poor wound healing and fluid retention. 6. I explained to patient that she will more than likely continue to have frequent hospitalizations, and that we will continue to try to stabilize her, but it is unlikely that she will get better, unlikely she will regain independence, but instead, she will most likely continue to decline and require atc care and because of this, we think Hospice may be an option for her to consider. 7. Patient not happy with the mention of Hospice, telling me that she does not understand why we continue to talk about Hospice. Despite my attempt to help her understand why we think it may be an appropriate option for her, she does not seem to think her health and functional status is as poor as I described. 8. I spoke with the patients daughter, Liseth Cooney, at length. David Murrieta has very good understanding of patients health and significant decline and why a Hospice information session has been recommended. She agrees that her mom is not accepting how unwell she or how debilitated she is. 9. We also discussed patients code status. David Murrieta stated that she texts her mom and has been visiting her while hospitalized, she will try to support continued [address] and code status discussions. . 
10. DNR discussion-patient continues to have a full CODE STATUS at this time. 11. Will call daughter tomorrow 3/3 provide update and see if we can schedule bedside meeting to continue [address] discussion 12. Initial consult note routed to primary continuity provider and/or primary health care team members 13. Communicated plan of care with: Palliative IDTTracy 192 Team, [facility] 
 
 GOALS OF CARE / TREATMENT PREFERENCES:  
 
GOALS OF CARE: 
Patient/Health Care Proxy Stated Goals: Cure TREATMENT PREFERENCES:  
Code Status: Full Code Advance Care Planning: 
[x] [facility] Interdisciplinary Team has updated the ACP Navigator with [employer] and Patient Capacity Primary Decision MakerDcharlette Apo - Daughter - [phone] Advance Care Planning 2/25/2021 Patient's Healthcare Decision Maker is: -  
Primary Decision Maker Name -  
Confirm Advance Directive None Patient Would Like to Complete Advance Directive No  
Does the patient have other document types - Medical Interventions: Full interventions Other Instructions: Other: As far as possible, the palliative care team has discussed with patient / health care proxy about goals of care / treatment preferences for patient. HISTORY:  
 
History obtained from: medical records/ nurse/ patient CHIEF COMPLAINT: Im tired HPI/SUBJECTIVE: The patient is:  
[x] Verbal and participatory [] Non-participatory due to:  
Patient stated she is tired, too many people in today, she continues to acknowledge poor appetite. Patient denied pain, denied SOB 
 
 
2/26- CXR increasing pulmonary edema and persistent  left pleural effusion and  LL atelectaisis 2/27- Back in afib, increased SOB, temp 101.1, repeat cultures pending. CXR +cardiomegaly, +Mild pulmonary edema and possible left pleural effusion without significant change. 2/28- Hypotensive, roland started. Urine cx + gram neg, started on Merrem. Hgb down 7.3, will need transfusion. Pulmonal reconsulted, patient w/ mod to severe COPD, hypotension 2/2 sepsis, cardiac dx, anemia, now in afib with rvr and severe hypoalbuminemia.  with volume overload 3/1- Maxed on roland, patient poor intake, severe hypoalbuminemia, 3L nasal canula, Pill cam resulted, + multiple AVMs, however no active bleeding . 3/2- Required amiodarone drip earlier in day, has since stopped. Roland stopped this am. On 3L nasal canula Clinical Pain Assessment (nonverbal scale for severity on nonverbal patients):  
Clinical Pain Assessment Severity: 1 Location: throat Character: sore, dry, \"scratchy\" Duration: 1 day Effect: difficult to talk Factors: unknown Frequency: unknown Activity (Movement): Lying quietly, normal position Duration: for how long has pt been experiencing pain (e.g., 2 days, 1 month, years) Frequency: how often pain is an issue (e.g., several times per day, once every few days, constant) FUNCTIONAL ASSESSMENT:  
 
Palliative Performance Scale (PPS): PPS: 30 
 
 
 PSYCHOSOCIAL/SPIRITUAL SCREENING:  
 
 Palliative IDT has assessed this patient for cultural preferences / practices and a referral made as appropriate to needs (Cultural Services, Patient Advocacy, Ethics, etc.) Any spiritual / Protestant concerns: 
[] Yes /  [x] No 
 
Caregiver Burnout: 
[] Yes /  [] No /  [x] No Caregiver Present Anticipatory grief assessment:  
[x] Normal  / [] Maladaptive ESAS Anxiety: Anxiety: 0 
 
ESAS Depression: Depression: 0 REVIEW OF SYSTEMS:  
 
Positive and pertinent negative findings in ROS are noted above in HPI. The following systems were [x] reviewed / [] unable to be reviewed as noted in HPI Other findings are noted below. Systems: constitutional, ears/nose/mouth/throat, respiratory, gastrointestinal, genitourinary, musculoskeletal, integumentary, neurologic, psychiatric, endocrine. Positive findings noted below. Modified ESAS Completed by: provider Fatigue: 10 Drowsiness: 8 Depression: 0 Pain: 1 Anxiety: 0 Nausea: 0 Anorexia: 0 Dyspnea: 4 Stool Occurrence(s): 1 PHYSICAL EXAM:  
 
From RN flowsheet: 
Wt Readings from Last 3 Encounters:  
03/02/21 239 lb 11.2 oz (108.7 kg) 02/15/21 232 lb (105.2 kg) 02/08/21 254 lb (115.2 kg) Blood pressure (!) 134/110, pulse (!) 120, temperature 97.9 °F (36.6 °C), resp. rate 25, height 5' 7\" (1.702 m), weight 239 lb 11.2 oz (108.7 kg), SpO2 98 %. Pain Scale 1: Numeric (0 - 10) Pain Intensity 1: 0 Pain Onset 1: acute Pain Location 1: Abdomen Pain Orientation 1: Mid 
Pain Description 1: Aching Pain Intervention(s) 1: Medication (see MAR) Last bowel movement, if known:  
 
Constitutional: Appears stated age, large body habitus, extreme fatigue, weakness, NAD Eyes: pupils equal, anicteric, watery ENMT: no nasal discharge, very dry mucous membranes Cardiovascular: regular rhythm, distal pulses intact Respiratory: mildly labored, symmetric, pursed lip breathing Gastrointestinal: large, soft non-tender, +bowel sounds Musculoskeletal:  no deformity, no tenderness to palpation Skin: warm, dry, edema in upper and lower extremities Neurologic: Drowsy, difficulty keeping eyes open, weak, is able to follow commands, moving all extremities Psychiatric: appropriate  affect, no hallucinations Other: 
 
 
 HISTORY:  
 
Active Problems: 
  Type II diabetes mellitus ([address] (10/9/2015) Essential hypertension (1/22/2016) Atrial fibrillation ([address] (11/16/2018) COPD (chronic obstructive pulmonary disease) ([address] (7/13/2019) Acute on chronic systolic CHF (congestive heart failure) ([address] (1/26/2021) Elevated brain natriuretic peptide (BNP) level (2/23/2021) Acute blood loss anemia (2/23/2021) Dyspnea (2/23/2021) GIB (gastrointestinal bleeding) (2/23/2021) Symptomatic anemia (2/23/2021) Past Medical History:  
Diagnosis Date  Asthma  Atrial fibrillation ([address] 11/16/2018  Autoimmune disease ([address]   
 fibromyalgia  CAD (coronary artery disease) 10/9/2015  Closed wedge compression fracture of T7 vertebra ([address] 11/13/2018  COPD (chronic obstructive pulmonary disease) (HCC)  Diabetes ([address]  DVT (deep vein thrombosis) in pregnancy  GERD (gastroesophageal reflux disease)  Heart failure ([address]  History of vascular access device 09/30/2020  
 4f midline, 12cm at 1cm out placed in L Cephalic by Samira Nixon RN  
 HTN (hypertension)  NSTEMI (non-ST elevated myocardial infarction) ([address] 10/9/2015  Obesity (BMI 30-39.9) 11/13/2018  PAD (peripheral artery disease) ([address] prior stenting  Sleep apnea   
 wears CPAP  
 Statin intolerance Past Surgical History:  
Procedure Laterality Date  COLONOSCOPY N/A 1/4/2021 COLONOSCOPY performed by Cha Washburn MD at [address] [address]  HX COLOSTOMY    
 and reversal, post episiotomy repair [facility] UROLOGICAL  2009  
 bladder sling  IR KYPHOPLASTY LUMBAR  10/8/2020  [place] ABDOMEN SURGERY PROC UNLISTED    
 hital hernia repair, gall bladder removed  [place] AMPUTATION TRANSMETACARPAL Right 06/12/2019  
 entire ring finger, 2nd & 3rd fingers (transmetacarpal)  [place] BREAST SURGERY PROCEDURE UNLISTED    
 lumpectomy  [place] CARDIAC SURG PROCEDURE UNLIST  10/9/15  
 2 stents Family History Problem Relation Age of Onset  Diabetes Mother  Heart Failure Mother  Kidney Disease Mother  Diabetes Father  Heart Disease Father  Elevated Lipids Father  Heart Failure Father  Heart Disease Brother  Cancer Brother   
     kidney  Diabetes Brother  No Known Problems Brother  No Known Problems Brother History reviewed, no pertinent family history. Social History Tobacco Use  Smoking status: Former Smoker Quit date: 3/30/2017 Years since quitting: 3.9  Smokeless tobacco: Never Used Substance Use Topics  Alcohol use: No  
  Alcohol/week: 0.0 standard drinks Comment: very very rarely Allergies Allergen Reactions  Advair Diskus [Fluticasone Propion-Salmeterol] Rash  Aspartame Other (comments)  Breo Ellipta [Fluticasone Furoate-Vilanterol] Rash  Bumex [Bumetanide] Myalgia  Ciprofloxacin Rash  Statins-Hmg-Coa Reductase Inhibitors Other (comments) Muscle pain Lipitor/crestor/zocor  Sulfa (Sulfonamide Antibiotics) Rash  Tetracycline Other (comments) musclepain  Torsemide Rash and Myalgia  Zetia [Ezetimibe] Myalgia Current Facility-Administered Medications Medication Dose Route Frequency  metoprolol tartrate (LOPRESSOR) tablet 25 mg  25 mg Oral Q12H  digoxin (LANOXIN) injection 125 mcg  125 mcg IntraVENous ONCE PRN  mirtazapine (REMERON) tablet 15 mg  15 mg Oral QHS  ipratropium (ATROVENT) 0.02 % nebulizer solution 0.5 mg  0.5 mg Nebulization QID RT  
 0.9% sodium chloride infusion 250 mL  250 mL IntraVENous PRN  
  arformoteroL (BROVANA) neb solution 15 mcg  15 mcg Nebulization BID RT  
 budesonide (PULMICORT) 500 mcg/2 ml nebulizer suspension  500 mcg Nebulization BID RT  
 montelukast (SINGULAIR) tablet 10 mg  10 mg Oral QHS  meropenem (MERREM) 500 mg in sterile water (preservative free) 10 mL IV syringe  0.5 g IntraVENous Q8H  
 nystatin (MYCOSTATIN) 100,000 unit/mL oral suspension 500,000 Units  500,000 Units Oral QID AFTER MEALS  
 amiodarone (CORDARONE) tablet 200 mg  200 mg Oral DAILY  insulin lispro (HUMALOG) injection   SubCUTAneous AC&HS  
 glucose chewable tablet 16 g  4 Tab Oral PRN  
 dextrose (D50W) injection syrg 12.5-25 g  25-50 mL IntraVENous PRN  
 glucagon (GLUCAGEN) injection 1 mg  1 mg IntraMUSCular PRN  pantoprazole (PROTONIX) 40 mg in 0.9% sodium chloride 10 mL injection  40 mg IntraVENous Q12H  
 sodium chloride (NS) flush 5-40 mL  5-40 mL IntraVENous Q8H  
 sodium chloride (NS) flush 5-40 mL  5-40 mL IntraVENous PRN  
 acetaminophen (TYLENOL) tablet 650 mg  650 mg Oral Q6H PRN Or  
 acetaminophen (TYLENOL) suppository 650 mg  650 mg Rectal Q6H PRN  polyethylene glycol (MIRALAX) packet 17 g  17 g Oral DAILY PRN  
 bisacodyL (DULCOLAX) suppository 10 mg  10 mg Rectal DAILY PRN  
 ondansetron (ZOFRAN) injection 4 mg  4 mg IntraVENous Q6H PRN  
 0.9% sodium chloride infusion 250 mL  250 mL IntraVENous PRN  
 apixaban (ELIQUIS) tablet 5 mg  5 mg Oral BID  
 
 
 
 LAB AND IMAGING FINDINGS:  
 
Lab Results Component Value Date/Time WBC 11.5 (H) 03/02/2021 02:25 AM  
 HGB 8.6 (L) 03/02/2021 02:25 AM  
 PLATELET 856 14/70/9204 02:25 AM  
 
Lab Results Component Value Date/Time  Sodium 138 03/02/2021 02:25 AM  
 Potassium 3.5 03/02/2021 02:25 AM  
 Chloride 104 03/02/2021 02:25 AM  
 CO2 25 03/02/2021 02:25 AM  
 BUN 14 03/02/2021 02:25 AM  
 Creatinine 1.12 (H) 03/02/2021 02:25 AM  
 Calcium 7.5 (L) 03/02/2021 02:25 AM  
 Magnesium 2.2 02/23/2021 09:19 PM  
 Phosphorus 2.2 (L) 01/03/2021 01:59 AM  
  
Lab Results Component Value Date/Time Alk. phosphatase 189 (H) 03/02/2021 02:25 AM  
 Protein, total 4.9 (L) 03/02/2021 02:25 AM  
 Albumin 1.8 (L) 03/02/2021 02:25 AM  
 Globulin 3.1 03/02/2021 02:25 AM  
 
Lab Results Component Value Date/Time INR 1.1 02/22/2021 10:46 PM  
 Prothrombin time 11.1 02/22/2021 10:46 PM  
 aPTT 26.1 02/22/2021 10:46 PM  
  
Lab Results Component Value Date/Time Iron 24 (L) 02/25/2021 05:45 AM  
 TIBC 217 (L) 02/25/2021 05:45 AM  
 Iron % saturation 11 (L) 02/25/2021 05:45 AM  
 Ferritin 105 02/25/2021 05:45 AM  
  
No results found for: PH, PCO2, PO2 No components found for: [place] Lab Results Component Value Date/Time CK 18 (L) 05/21/2019 06:13 AM  
 CK - MB 1.1 11/13/2018 03:26 PM  
  
 
 
   
 
Total time: 35 min Counseling / coordination time, spent as noted above: 30 min 
> 50% counseling / coordination?: yes Prolonged service was provided for  []30 min   []75 min in face to face time in the presence of the patient, spent as noted above. Time Start:  
Time End:  
Note: this can only be billed with 34912 (initial) or 93582 (follow up). If multiple start / stop times, list each separately.

## 2021-03-02 NOTE — PROGRESS NOTES
1900-Bedside and Verbal shift change report given to Brandon Rendon RN (oncoming nurse) by Sara Duggan (offgoing nurse). Report included the following information SBAR, Kardex, Intake/Output and MAR.  
 
0145- Pt back in A Fib.  
 
0156- EKG confirmed pt in a fib. HR ranging from 110's to 140's. Paged on call cardiology. 0200- Telephone order for 150mg amio bolus and then start an amio gtt at 1mg/min per Dr. Jessica Keys. Goal to keep heart rate <120.  
 
0222- Amio bolus administered. 0235- Amio gtt started at 1mg/min. Central line Type: triple lumen Central Line Insert Date: 2/23 Reason Central Line Placed: Roland drip Central Line Dressing Date:3/1 Biopatch in place? Yes No: yes Tubing labeled and appropriate? Yes No: yes Alcohol caps on all open ports? Yes No: yes Last CHG bath (time&date): 3/1/21 1423 Reviewed with provider and central line must stay in for the following reasons: Roland gtt This patient was assisted with Intentional Toileting every 2 hours during this shift. Documentation of ambulation and output reflected on Flowsheet. 0700-Bedside and Verbal shift change report given to Carmen Christina RN (oncoming nurse) by Sasha Darby  (offgoing nurse). Report included the following information SBAR, Kardex, MAR, Accordion, Recent Results and Med Rec Status.

## 2021-03-02 NOTE — PROGRESS NOTES
Transition Plan of Care RUR 53% Plan: 1. Monitor patients response to treatment. 2. Patient is on 3L O2 
3. Hospice info session referral sent to [facility] today. 4. Patient resports feeling better. 5.  Episode of rapid A fib last night and Cards gave Amio bolus and started Amio drip. 6. Case Management to follow.  
 
ERIC Aguiar

## 2021-03-02 NOTE — PROGRESS NOTES
Physical Therapy 3/2/2021 Chart reviewed and cleared by RN. Pt met with hospice this afternoon and is declining need for any hospice intervention. Has been at [facility] since January. Third admission this year. Pt supine in bed pleasant though adamant she will not work with therapy today. Typical trend with patient during past admissions. Will continue to follow and complete evaluation when patient is agreeable.  
 
Thank Leeann Chisholm, PT, DPT

## 2021-03-02 NOTE — WOUND CARE
Wound Consult: Follow Up Visit. Chart reviewed. Consulted for right third finger on admission and skin. Spoke with patients nurse,  Miguelrelyn Maker and in with patient. Patient is resting on a Kincaid bed with gel mattress. Heels off loaded with pillows. Patient is alert and oriented; requires one to two to move side to side in bed. Assessment: 
Buttocks/sacrum - pink , blanching throughout. 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 Surrounding this is red, very tender to touch. This was noted POA and continues to be painful, edematous and inflamed at area for patient. Xray last week showing soft tissue swelling. Lower legs - faintly pink, edema but no weeping or wounds. Heels without redness. Wound Recommendations: 
Discussed with Dr. Joanna Wise; finger remains painful for patient, with redness surrounding the callus/moisture at tip; unsure if tip of residual finger needs any sharp unroofing - consult to hand surgeon placed. Skin Care Recommendations: 1. Minimize friction/shear: minimize layers of linen/pads under patient. 2. Off load pressure/reposition: continue to turn and reposition approximately every 2 hours; float heels with pillows or use off loading heel boots. 3. Manage Moisture - keep skin folds dry; incontinence skin care with incontinence wipes; z gaurd barrier ointment; appropriate sized briefs if needed; Alphonso Jag in use to help contain urine. 4. Continue to monitor nutrition, pain, and skin risk scale, and skin assessment. Plan: We will continue to reassess routinely and as needed. Ulises Mcknight RN,cWCN [facility], Wound / Ostomy Department Wound Healing Office [phone]

## 2021-03-02 NOTE — PROGRESS NOTES
[facility]: \"I have wounds\" HPI: 70year old female with moderately severe COPD, CKD, afib on eliquis, HAYES who presented last week with anemia and dyspnea. Workup is ongoing into the etiology of her anemia. She is now requiring jimenez, however, and pulmonary has been asked to evaluate for possible ICU needs. Patient is alert and complaining about her wounds. States her breathing feels fine. Isn't very forthcoming with any history, however. She is followed in our office but hasn't been seen in nearly a year and is often quite sporadic with follow up. She is maintained on dulera, spiriva, PRN albuterol, singulair and O2. Also states she is compliant with CPAP at night but hasn't followed in sleep in a long time. PMH: Moderately severe COPD (FEV1 51% pred), chronic hypoxemic respiratory failure on 2-3L home O2 DM, CAD, GERD, DVT, HTN, HAYES on CPAP, afib in eliquis, HFpEF, obesity, appendectomy, hysterectomy, anemia SH: States she quit smoking \"a couple of years ago\" FH: DM, CAD Interval history Afebrile Hypotensive - on Jimenez up to 110mcg overnight; back down to 45mcg after receiving small bolus In rapid afib overnight and given amio bolus and drip initiated Sats 97% on 3L WBC 11.5 - stable Hgb 8.6 - better s/p 1 unit PRBC 2/28 Creat 1.12 - better Blood cx no growth x 3 days Urine cx with ESBL 
 
ROS: Doesn't feel well today. States her breathing feels \"heavy. \"  Denies CP. Denies LE pain unless being pressed/touched. Denies fever, chills, cough. PE:  
Vitals reviewed Gen: Chronically ill appearing, fatigued, no acute distress HEENT: NCAT, EOMI Mouth: Moist oral mucosa, normal pharynx, crowded airway CV: current NST; in and out of afib over the weekend Lungs: CTA B/L, mild diminished GI: Soft, non tender, obese Ext: Moves all, lymphadema, chronic stasis changes, erythremic Neuro: Alert, answers questions appropriately, grossly nonfocal 
 
Lab Results Component Value Date/Time WBC 11.5 (H) 03/02/2021 02:25 AM  
 Hemoglobin (POC) 13.9 10/09/2015 12:27 PM  
 HGB 8.6 (L) 03/02/2021 02:25 AM  
 Hematocrit (POC) 26 (L) 08/01/2019 11:41 AM  
 HCT 29.0 (L) 03/02/2021 02:25 AM  
 PLATELET 633 29/49/4460 02:25 AM  
 MCV 96.7 03/02/2021 02:25 AM  
 
Lab Results Component Value Date/Time Sodium 138 03/02/2021 02:25 AM  
 Potassium 3.5 03/02/2021 02:25 AM  
 Chloride 104 03/02/2021 02:25 AM  
 CO2 25 03/02/2021 02:25 AM  
 Anion gap 9 03/02/2021 02:25 AM  
 Glucose 79 03/02/2021 02:25 AM  
 BUN 14 03/02/2021 02:25 AM  
 Creatinine 1.12 (H) 03/02/2021 02:25 AM  
 BUN/Creatinine ratio 13 03/02/2021 02:25 AM  
 GFR est AA 58 (L) 03/02/2021 02:25 AM  
 GFR est non-AA 48 (L) 03/02/2021 02:25 AM  
 Calcium 7.5 (L) 03/02/2021 02:25 AM  
 
CXR Results  (Last 48 hours) None Impression: 
-hypotension, multifactorial with sepsis from UTI, cardiac disease, anemia.  
-moderately severe COPD not in acute exacerbation 
-chronic hypoxic respiratory failure 
-afib with RVR, HFpEF with volume overload 
-anemia -ESBL Plan: 
-continue jimenez to goal MAP 65/. If gets to 100 mcg or greater will need ICU transfer. Discussed with nursing. 
-continue antibiotics and f/u blood cx - on meropenem 
-hold antihypertensives/diuretics--holding CCB/BB 
-anemia workup per GI/hematology. Still on eliquis. 
-cardiology following. Continue amiodarone drip. Lasix 20mg x 1 dose  Will need to closely follow BP. 
-continue Brovana/Pulmicort nebs and Atrovent nebs in substitution for home Spriva/Dulera 
-Continue O2 to keep sats > 90%; on 3L at baseline 
-Protonix 
-Palliative care following. Patient declines hospice and wishes to remain full code. Tough situation with multi organ dysfunction making care difficult Patient critically ill requiring pressor support due to worsening hypotension and amiodarone drip for rapid afib and is high risk for further decompensation. CC time EOP 35+ min. Discussed with nursing. Gregg Carlisle PA-C [facility]

## 2021-03-02 NOTE — HOSPICE
[employer] Good Help to Those in Need 
[phone] Patient Name: Tanya Mendoza YOB: 1945 Age: 70 y.o. [employer] RN Note:  Hospice consult received, reviewing chart. Will follow up with Unit Nurse and Care Manager to discuss plan of care, patient status and discharge disposition Chart reviewed. Patient interested in information regarding hospice services. Chronically ill, Multiple hospitalizations in past months. Will see patient today and follow up as able Thank you for the opportunity to be of service to this patient.

## 2021-03-02 NOTE — PROGRESS NOTES
Occupational Therapy Note: 
Orders acknowledged, chart reviewed, and spoke with nursing. Attempted OT evaluation however patient declined despite max encouragement. Patient reports she is \"tired\" and \"I've had visitor after visitor (staff) in and out all day\". Offered repositioning for comfort, gown change + simple ADL tasks, and bed level activity when patient refused OOB activity however she further declined any activity today. Will follow-up next tx day.    
Ari García, OTR/L

## 2021-03-02 NOTE — HOSPICE
TANVIW offered info visit to patient at bedside. Patient awake and alert. She states she does not understand why team believes she is ready for hospice. She states she knows what hospice is and agreed that she would like to hear from her medical team before making any decisions. TANVIW updated . Hospice will remain available should patient change her mind. CINDI Underwood, [facility] H[facility]ice Social Worker 
[phone]

## 2021-03-02 NOTE — PROGRESS NOTES
[facility] [place] at [address], [address] W: [phone]  F: [phone] Reason for Visit:  
Darrin Leavitt is a 70 y.o. female who is seen for follow up of anemia Interval History: She reports feeling quite a bit better today. No more abdominal pain, nausea, or diarrhea. No bleeding. PAST HISTORY: The following sections were reviewed and updated in the EMR as appropriate: PMH, SH, FH, Medications, Allergies. Allergies Allergen Reactions  Advair Diskus [Fluticasone Propion-Salmeterol] Rash  Aspartame Other (comments)  Breo Ellipta [Fluticasone Furoate-Vilanterol] Rash  Bumex [Bumetanide] Myalgia  Ciprofloxacin Rash  Statins-Hmg-Coa Reductase Inhibitors Other (comments) Muscle pain Lipitor/crestor/zocor  Sulfa (Sulfonamide Antibiotics) Rash  Tetracycline Other (comments) musclepain  Torsemide Rash and Myalgia  Zetia [Ezetimibe] Myalgia Review of Systems: A complete review of systems was obtained, reviewed. Pertinent findings reviewed above. Physical Exam:  
 
Visit Vitals BP (!) 137/41 Pulse 94 Temp 99.5 °F (37.5 °C) Resp 24 Ht 5' 7\" (1.702 m) Wt 239 lb 11.2 oz (108.7 kg) SpO2 97% BMI 37.54 kg/m² General: no distress, frail and ill appearing Respiratory: normal respiratory effort CV: LE peripheral edema Skin: no rashes; no ecchymoses; no petechiae Psych: alert, oriented, normal mood/affect Results:  
 
Lab Results Component Value Date/Time WBC 11.5 (H) 03/02/2021 02:25 AM  
 HGB 8.6 (L) 03/02/2021 02:25 AM  
 HCT 29.0 (L) 03/02/2021 02:25 AM  
 PLATELET 943 46/93/4707 02:25 AM  
 MCV 96.7 03/02/2021 02:25 AM  
 ABS. NEUTROPHILS 8.3 (H) 03/02/2021 02:25 AM  
 Hemoglobin (POC) 13.9 10/09/2015 12:27 PM  
 Hematocrit (POC) 26 (L) 08/01/2019 11:41 AM  
 
Lab Results Component Value Date/Time  Sodium 138 03/02/2021 02:25 AM  
 Potassium 3.5 03/02/2021 02:25 AM  
 Chloride 104 03/02/2021 02:25 AM  
 CO2 25 03/02/2021 02:25 AM  
 Glucose 79 03/02/2021 02:25 AM  
 BUN 14 03/02/2021 02:25 AM  
 Creatinine 1.12 (H) 03/02/2021 02:25 AM  
 GFR est AA 58 (L) 03/02/2021 02:25 AM  
 GFR est non-AA 48 (L) 03/02/2021 02:25 AM  
 Calcium 7.5 (L) 03/02/2021 02:25 AM  
 Sodium (POC) 139 08/01/2019 11:41 AM  
 Potassium (POC) 5.2 (H) 08/01/2019 11:41 AM  
 Chloride (POC) 109 (H) 08/01/2019 11:41 AM  
 Glucose (POC) 90 03/01/2021 09:10 PM  
 BUN (POC) 22 (H) 08/01/2019 11:41 AM  
 Creatinine (POC) 1.1 08/01/2019 11:41 AM  
 Calcium, ionized (POC) 1.14 08/01/2019 11:41 AM  
 
Lab Results Component Value Date/Time Bilirubin, total 0.6 03/02/2021 02:25 AM  
 ALT (SGPT) 11 (L) 03/02/2021 02:25 AM  
 Alk. phosphatase 189 (H) 03/02/2021 02:25 AM  
 Protein, total 4.9 (L) 03/02/2021 02:25 AM  
 Albumin 1.8 (L) 03/02/2021 02:25 AM  
 Globulin 3.1 03/02/2021 02:25 AM  
 
Lab Results Component Value Date/Time Reticulocyte count 5.4 (H) 02/24/2021 05:52 AM  
 Iron % saturation 11 (L) 02/25/2021 05:45 AM  
 TIBC 217 (L) 02/25/2021 05:45 AM  
 Ferritin 105 02/25/2021 05:45 AM  
 Vitamin B12 597 02/24/2021 05:52 AM  
 Folate 13.6 02/25/2021 05:45 AM  
 Haptoglobin 65 02/25/2021 05:45 AM  
  (H) 02/25/2021 05:45 AM  
 Sed rate, automated 35 (H) 11/02/2020 04:26 PM  
 C-Reactive protein 5.04 (H) 01/27/2021 01:58 AM  
 TSH 3.40 01/13/2021 11:29 AM  
 M-Mack Not Observed 02/25/2021 05:45 AM  
 Lipase 334 06/27/2018 10:49 AM  
 Hep C virus Ab Interp. NONREACTIVE 05/30/2020 12:31 AM  
 
Lab Results Component Value Date/Time INR 1.1 02/22/2021 10:46 PM  
 aPTT 26.1 02/22/2021 10:46 PM  
 D-dimer 1.77 (H) 01/27/2021 01:58 AM  
 Fibrinogen 425 01/27/2021 01:58 AM  
 
Lab Results Component Value Date/Time  (H) 02/25/2021 05:45 AM  
 
 
1/1/21 Peripheral smear Normocytic anemia with moderate anisopoikilocytosis, polychromasia, and scattered nucleated red blood cells; no increase in schistocytes identified Leukocytosis comprised predominantly of mature neutrophils with toxic granularity Thrombocytosis Stool blood 1/1/2021: positive Stool blood 2/22/2021: positive 1/4/21 EGD/Colonoscopy/ Dr Sravan Pena Impression: 
Candida esophagitis, gastritis, duodenitis, colon polyps, diverticulosis. Antral mass likely a benign lipoma 
  
 
2/25/21 CT A/P w/wo contrast 
1. No evidence of active GI bleeding. No evidence of acute process in the 
abdomen or pelvis. 2. Moderate left and small right pleural effusions with overlying lower lobe 
subsegmental atelectasis. Pulmonary interstitial and alveolar edema. 3. Left inguinal postoperative changes involving the left femoral vessels, with 
a small superficial fluid collection overlying the left femoral vessels 
measuring 2.1 x 1.4 cm. Correlate with history. Consider ultrasound to evaluate 
for pseudoaneurysm and/or hematoma. 4. Nonspecific subcutaneous edema in the left lateral abdominal wall. Assessment/Recommendations:  
 
1. Anemia, normocytic S/p 2 units pRBC This appears to be secondary to recurring episodes of blood loss, based on the elevated retic and the repeated positive stool blood tests. No strong evidence for hemolysis, and other labs unremarkable. She has not had a bone marrow biopsy, but the intermittent nature of her anemia as well as the elevated retic both argue strongly against a bone marrow pathology. She received venofer 100mg IV on 1/2/2021 and 200mg IV on 2/25/2021, 2/27/2021, and 3/1/2021. The source of blood loss is not clear, but possibly from intestinal AVMs. EGD and colonoscopy unrevealing. 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 Currently her HGB is stable and rising. Monitor and transfuse PRN. Consider oral iron on discharge. 2. Abdominal pain, Diarrhea, Nausea 
Uncertain etiology.  CT without a cause. 
 
3. Afib 
On apixaban.  Cardiology and GI have discussed risks/benefits and made the decision to resume therapy. 
 
4. Fever / UTI 
On IV abx.  Urine growing E coli. 
 
 
She is stable from a hematology standpoint.  I will sign off, please call with questions.  I recommend monitoring her CBC and iron studies with her PCP on discharge, and she can follow up with me in the office if needed for additional IV ion in the future. 
 
Signed By: Dav Ho MD

## 2021-03-02 NOTE — PROGRESS NOTES
Spiritual Care Assessment/Progress Note [place] [facility] 
 
 
NAME: Jesus Asencio      MRN: [id number] AGE: 70 y.o. SEX: female Lutheran Affiliation: Mosque Language: English  
 
3/2/2021     Total Time (in minutes): 20 Spiritual Assessment begun in [facility] 3 PRO CARE TELE 2 through conversation with: 
  
    [x]Patient        [] Family    [] Friend(s) Reason for Consult: Palliative Care, Initial/Spiritual Assessment Spiritual beliefs: (Please include comment if needed) 
   [] Identifies with a leanna tradition:     
   [] Supported by a leanna community:        
   [] Claims no spiritual orientation:       
   [] Seeking spiritual identity:            
   [] Adheres to an individual form of spirituality:       
   [x] Not able to assess:                   
 
    
Identified resources for coping:  
   [] Prayer                           
   [] Music                  [] Guided Imagery 
   [] Family/friends                 [] Pet visits [] Devotional reading                         [x] Unknown 
   [] Other:                                         
 
 
Interventions offered during this visit: (See comments for more details) Patient Interventions: Other (comment)(Unable to assess) Plan of Care: 
 
 [] Support spiritual and/or cultural needs  
 [] Support AMD and/or advance care planning process    
 [] Support grieving process 
 [] Coordinate Rites and/or Rituals  
 [] Coordination with community clergy [] No spiritual needs identified at this time 
 [] Detailed Plan of Care below (See Comments)  [] Make referral to Music Therapy 
[] Make referral to Pet Therapy    
[] Make referral to Addiction services 
[] Make referral to [facility] 
[] Make referral to Spiritual Care Partner 
[] No future visits requested       
[x] Follow up upon further referrals Comments:  visited Mrs. Leonarda Salcido for an initial spiritual assessment on the [facility] unit. Mrs. Leonarda Salcido was awake, alert, and sitting up in bed watching TV when the  came into the room. No family was present at this time.  introduced herself and Mrs. Leonarda Salcido greeted the  warmly and shared that she did not need anything at this time. She then politely disengaged in the conversation to watch TV.  was unable to assess spiritual and/or Baptist beliefs at this time, however previous 's notes share that she is [address] are available for further support upon referral 
Amarilis Franco.  Paging Service: [phone])

## 2021-03-02 NOTE — PROGRESS NOTES
Daily Progress Note: 3/2/2021 Alexandria Stuart MD 
 
Assessment/Plan:  
Acute blood loss anemia due to GIB / Symptomatic anemia:   
-  transfuse as needed. GI consulted. -  Capsule study resulted - AVMs not currently bleeding 
  
Acute on chronic systolic CHF /  Elevated BNP/ A fib with RVR:   
-  Cardiology consulted.  
  
Dyspnea: waxes and wanes 
  
COPD: Appears to be stable and at baseline with no exacerbation. Continue with baseline oxygen supplementation and increase as needed. Scheduled and as needed bronchodilators. 
  
Atrial fibrillation ([address] (11/16/2018) on Eliquis:  
- per Cardiology 
  
DM type 2:  Monitor blood glucose levels with insulin sliding scale coverage. No basal coverage for now. Monitor for complications. UTI 
- urine culture with E Coli ESBL sensitive to Merrem 
  
Body mass index is 36.96 kg/m².: 30.0 - 39.9 Obese   
   
 
Problem List: 
Problem List as of 3/2/2021 Date Reviewed: 11/2/2020 Codes Class Noted - Resolved Elevated brain natriuretic peptide (BNP) level ICD-10-CM: R79.89 ICD-9-CM: 790.99  2/23/2021 - Present Acute blood loss anemia ICD-10-CM: D62 
ICD-9-CM: 285.1  2/23/2021 - Present Dyspnea ICD-10-CM: R06.00 
ICD-9-CM: 786.09  2/23/2021 - Present GIB (gastrointestinal bleeding) ICD-10-CM: K92.2 ICD-9-CM: 578.9  2/23/2021 - Present Symptomatic anemia ICD-10-CM: D64.9 ICD-9-CM: 285.9  2/23/2021 - Present Pneumonia ICD-10-CM: J18.9 ICD-9-CM: 599  1/26/2021 - Present SOB (shortness of breath) ICD-10-CM: R06.02 
ICD-9-CM: 786.05  1/26/2021 - Present Anemia ICD-10-CM: D64.9 ICD-9-CM: 285.9  1/26/2021 - Present Acute on chronic systolic CHF (congestive heart failure) (HCC) ICD-10-CM: N68.21 ICD-9-CM: 428.23, 428.0  1/26/2021 - Present Uncontrolled type 2 diabetes mellitus with hyperglycemia (HCC) ICD-10-CM: E11.65 ICD-9-CM: 250.02  1/26/2021 - Present Left lower lobe pneumonia ICD-10-CM: J18.9 ICD-9-CM: 501  1/1/2021 - Present Leukocytosis ICD-10-CM: Y04.685 ICD-9-CM: 288.60  11/3/2020 - Present Chronic respiratory failure with hypoxia [facility]) ICD-10-CM: J96.11 
ICD-9-CM: 518.83, 799.02  11/2/2020 - Present Overview Signed 11/2/2020 10:12 PM by Jamie Guerrier MD  
  On 3L NC at home Cellulitis of lower extremity ICD-10-CM: L03.119 ICD-9-CM: 682.6  3/23/2020 - Present ASO (arteriosclerosis obliterans) ICD-10-CM: I70.90 ICD-9-CM: 440.9  9/5/2019 - Present PVD (peripheral vascular disease) ([facility] ICD-10-CM: I73.9 ICD-9-CM: 443.9  8/1/2019 - Present Severe obesity ([facility] ICD-10-CM: E66.01 
ICD-9-CM: 278.01  7/30/2019 - Present COPD (chronic obstructive pulmonary disease) ([facility]) ICD-10-CM: J44.9 ICD-9-CM: 722  7/13/2019 - Present Normocytic anemia ICD-10-CM: D64.9 ICD-9-CM: 285.9  7/13/2019 - Present PAD (peripheral artery disease) ([facility]) ICD-10-CM: I73.9 ICD-9-CM: 443.9  7/13/2019 - Present Mild intermittent asthma ICD-10-CM: J45.20 ICD-9-CM: 493.90  5/17/2019 - Present Brachial artery thrombus (HCC) ICD-10-CM: U25.3 ICD-9-CM: 444.21  4/26/2019 - Present Sleep apnea ICD-10-CM: G47.30 ICD-9-CM: 780.57  1/5/2019 - Present Overview Signed 1/5/2019  8:41 PM by Colletta Mourning, DO  
  wears CPAP Atrial fibrillation [facility]) ICD-10-CM: I48.91 
ICD-9-CM: 427.31  11/16/2018 - Present Obesity (BMI 30-39.9) (Chronic) ICD-10-CM: G35.8 ICD-9-CM: 278.00  11/13/2018 - Present Recurrent deep vein thrombosis (DVT) (HCC) ICD-10-CM: I82.409 ICD-9-CM: 453.40  3/30/2018 - Present Chronic anticoagulation (Chronic) ICD-10-CM: Z79.01 
ICD-9-CM: V58.61  3/30/2018 - Present Essential hypertension (Chronic) ICD-10-CM: I10 
ICD-9-CM: 401.9  1/22/2016 - Present CAD (coronary artery disease) ICD-10-CM: I25.10 ICD-9-CM: 414.00  10/9/2015 - Present Type II diabetes mellitus (HCC) (Chronic) ICD-10-CM: E11.9 ICD-9-CM: 250.00  10/9/2015 - Present RESOLVED: Syncope and collapse ICD-10-CM: R55 
ICD-9-CM: 780.2  9/29/2020 - 11/2/2020 RESOLVED: Cellulitis ICD-10-CM: L03.90 ICD-9-CM: 682.9  3/23/2020 - 5/29/2020 RESOLVED: Hypokalemia ICD-10-CM: E87.6 ICD-9-CM: 276.8  3/23/2020 - 5/29/2020 RESOLVED: Hyponatremia ICD-10-CM: E87.1 ICD-9-CM: 276.1  3/23/2020 - 5/29/2020 RESOLVED: Diarrhea ICD-10-CM: R19.7 ICD-9-CM: 787.91  3/23/2020 - 5/29/2020 RESOLVED: Syncope and collapse ICD-10-CM: R55 
ICD-9-CM: 780.2  7/13/2019 - 5/29/2020 RESOLVED: UTI (urinary tract infection) ICD-10-CM: N39.0 ICD-9-CM: 599.0  7/13/2019 - 11/2/2020 RESOLVED: Sepsis ([facility] ICD-10-CM: A41.9 ICD-9-CM: 038.9, 995.91  7/13/2019 - 11/3/2020 RESOLVED: Leg wound, left ICD-10-CM: I53.313F ICD-9-CM: 891.0  7/13/2019 - 5/29/2020 RESOLVED: Leg laceration, right, initial encounter ICD-10-CM: H77.628E ICD-9-CM: 894.0  7/13/2019 - 5/29/2020 RESOLVED: Cellulitis of right upper arm ICD-10-CM: L03.113 ICD-9-CM: 682.3  5/17/2019 - 5/29/2020 RESOLVED: Gangrene of finger of right hand ([facility] ICD-10-CM: X59 
ICD-9-CM: 785.4  5/17/2019 - 11/2/2020 RESOLVED: Bowel obstruction ([facility] ICD-10-CM: G81.060 ICD-9-CM: 560.9  1/8/2019 - 5/29/2020 RESOLVED: Partial small bowel obstruction ([facility] ICD-10-CM: K56.600 ICD-9-CM: 560.9  1/5/2019 - 5/29/2020 RESOLVED: Dyspnea ICD-10-CM: R06.00 
ICD-9-CM: 786.09  11/13/2018 - 5/29/2020 RESOLVED: ARF (acute renal failure) (HCC) ICD-10-CM: N17.9 ICD-9-CM: 584.9  11/13/2018 - 5/29/2020 RESOLVED: Closed wedge compression fracture of T7 vertebra ([facility] ICD-10-CM: P83.688H ICD-9-CM: 805.2  11/13/2018 - 5/29/2020  RESOLVED: Type 2 diabetes with nephropathy (HCC) ICD-10-CM: E11.21 
 ICD-9-CM: 250.40, 583.81  10/1/2018 - 11/2/2020 RESOLVED: Chest pain ICD-10-CM: R07.9 ICD-9-CM: 786.50  6/27/2018 - 5/29/2020 RESOLVED: Abdominal pain ICD-10-CM: R10.9 ICD-9-CM: 789.00  6/27/2018 - 5/29/2020 RESOLVED: Coronary artery disease involving native coronary artery without angina pectoris (Chronic) ICD-10-CM: I25.10 ICD-9-CM: 414.01  1/22/2016 - 11/2/2020 RESOLVED: HTN (hypertension) ICD-10-CM: I10 
ICD-9-CM: 401.9  10/9/2015 - 1/22/2016 RESOLVED: NSTEMI (non-ST elevated myocardial infarction) ([facility] ICD-10-CM: I21.4 ICD-9-CM: 410.70  10/9/2015 - 5/29/2020 Subjective:  
 70 y.o. female with history that includes DM 2, CAD with MI, COPD on 3LNC baseline, and A. fib on Eliquis who was recently admitted to [facility] with anemia, pneumonia, and acute on chronic CHF then transferred to rehab was sent back to the rehab due to anemia and dyspnea. She has been somewhat dyspneic for the past couple of days constant and worsening to the point of being severe even at rest with increased O2 requirements up to 4 LNC associated with orthopnea. She was found to have a hemoglobin of 6.8 at the rehab center. However, here in the ER she was found to have hemoglobin at 7.2 and she was back to her 3LNC. It was reported dark stools which tested FOBT positive. She is on Eliquis as mentioned. (Dr Emily Leung) 2/23:  Reports she if feeling better this AM with less SOB and O2 back to usual 3 liters. No ab pain. No CP. Sats ok on O2. Nurses report they are unable to get AM labs and pt needs central, PICC or mid line placed. She does not appear in any distress. Dr. Emily Leung has ordered blood and consented pt. Her chronic GI bleeding may warrant complete DC of anticoagulation - discussed with GI and little else to do. Also consulting Cards for her chronic decompensation of CHF/A fib episodes with mult readmissions - there may be little to do for this either. 2/24:  C/O mild nausea this AM and somewhat fatigued but otherwise little change. Rate has been up and down requiring does of IV dig and Amiodarone drip. Cards also consulting EP for poss ablation. Cards and GI agree with restart of Eliquis - risk of CVA > risk of bleeds. 2/25:  Still c/o nausea and now having loose stools - 3 overnight. Declines capsule study for GI bleeding. Also, at this time, reports she does not want to have pacer or any other studies/treatments and wants to consider Hospice. Discussed risks v benefits of continued work up and treatment of problems but she reports \"getting tired of having things done to me. \"  She wants to have an information session with Hospice for further consideration. 2/26:  No nausea at this moment. Needed Zofran once last night and had one loose stool. CT of ab/pelvis reveals nothing major. Capsule study of small bowel today. She does not need to be off tele for the study as she has hard wired tele and not remote tele. She reports she will do the capsule study and \"if nothing to fix then maybe do that Hospice. \"  Back in sinus rhythm at this moment. 2/27:  More shortness of breath and back in A fib again - rate 100s - 110s. No nausea at this moment. Tmax 101.1. Tnow 99. Repeat cultures pending. Check port CXR. Lungs sound more congested this AM.  Hb 7.4 this AM.  Will get Card to see again now that she is back in A fib with symptoms. 2/28:  Feels fatigued and sleepy. No CP or shortness of breath. BP lower last night and Roland had to be started. Urine culture 2/26/21 with 100K gram neg rods and Merrem started. May need to go to ICU. She is going to be diff to manage her fluids/BP due to A fib, CHF and other med problems. Blood culture neg so far. Tmax 99.1. Hb 7.3. May need to transfuse again with her low BP. Cardiology is seeing and Pul reconsulted. 3/:  Feeling a little better today in general.  Less sleepy. She is not moving about much - encouraged her to work with PT/OT. BP trending better but still low - still on pressor. Dilt and Metoprolol being held. Urine culture with E Coli ESBL again but sensitive to Merrem. Blood cult neg so far. Transfused 1 unit PRCs yesterday. Tearful and wants to consider hospice but also admits to feeling depressed - will add Remeron as she is on QTc prolonging meds. Consult had been placed to Hospice several days ago for information session. 3/:  Continues to report feeling better. BP better this AM - yesterday evening had worsening MAP and gave small fluid bolus and upped Roland to 110 - can now titrate again. Episode of rapid A fib last night and Cards gave Amio bolus and started Amio drip. Discussed results of capsule study with pt.   
  
Review of Systems: A comprehensive review of systems was negative except for that written in the HPI. Objective:  
Physical Exam:  
Visit Vitals BP (!) 154/46 Pulse (!) 122 Temp 99.5 °F (37.5 °C) Resp 15 Ht 5' 7\" (1.702 m) Wt 108.7 kg (239 lb 11.2 oz) SpO2 97% BMI 37.54 kg/m² O2 Flow Rate (L/min): 3 l/min O2 Device: Nasal cannula Temp (24hrs), Av.9 °F (37.2 °C), Min:97.7 °F (36.5 °C), Max:99.8 °F (37.7 °C) No intake/output data recorded. 1901 -  0700 In: 480 [P.O.:480] Out: 750 [Urine:750] General:  Alert, obese,cooperative, no distress, appears stated age. Head:  Normocephalic, without obvious abnormality, atraumatic. Eyes:  Conjunctivae/corneas clear. EOMs intact. Neck: Supple, symmetrical, trachea midline, no adenopathy, thyroid: no enlargement/tenderness/nodules, no carotid bruit and no JVD. Lungs:   Fairly clear except a few basilar rales bilaterally. Chest wall:  No tenderness or deformity. Heart:  Reg today with rate in 80s; 1/6 GRISELDA right 2nd ICS. Abdomen:   Soft, mild generalized tenderness without rebound or guarding. Bowel sounds normal. No masses,  No organomegaly. Extremities: no cyanosis. Trace LE edema on top of usual stasis changes. No calf tenderness or cords. Pulses: 2+ and symmetric all extremities. Skin:  turgor normal. No rashes other than stasis changes in LEs Neurologic:   Alert and oriented X 3. Fine motor of hands and fingers normal.   equal.  No cogwheeling or rigidity. Gait not tested at this time. Sensation grossly normal to touch. Gross motor of extremities normal.    
 
Data Review: EXAM:  CT ABD PELV W WO CONT 2/25/21 INDICATION: unexplained anemia and abd pain COMPARISON: CT abdomen pelvis 3/23/2020. CONTRAST:  100 mL of Isovue-370. 
 FINDINGS:  
Lower Thorax: 
Lung Bases: Moderate left and small right pleural effusions with overlying 
subsegmental atelectasis in the lower lobes. Bilateral interlobular septal 
thickening with scattered groundglass opacities. Heart: Unchanged cardiomegaly. No pericardial effusion. Coronary arteries stents 
noted. A vascular catheter terminates at the cavoatrial junction. Abdomen/Pelvis: 
Liver:  No focal liver lesions. Biliary system: Gallbladder is surgically absent. No intrahepatic or 
extrahepatic biliary ductal dilatation. Spleen: Normal. 
Pancreas: Normal. 
Kidneys/Ureters/Bladder: Multifocal cortical scarring in the kidneys. No renal 
masses. There is a 3 mm nonobstructing stone in the upper pole the right kidney, 
and a 2 mm nonobstructing stone in the lower pole the right kidney. No 
hydronephrosis or hydroureter. The bladder is normal. 
Adrenals: Normal. 
Stomach/bowel: No evidence of active GI bleeding. Incidental 2.1 cm gastric 
lipoma noted within the gastric body. No dilation or abnormal wall thickening is 
present. No free intraperitoneal air noted. Normal appendix. Fat-containing umbilical hernia, with postsurgical changes noted in the ventral abdominal wall. Reproductive Organs: Status post hysterectomy. No suspicious adnexal masses. Vasculature: Normal caliber arteries. Moderate calcific atherosclerosis of the 
abdominal aorta and iliac vasculature. There are recent postoperative changes in 
the left groin overlying the left femoral vessels, with prominent 
hyperdense/calcific material noted in this region and surgical clips. There is a 
small superficial fluid collection in the left groin measuring 2.1 x 1.4 cm 
(series 6 image 207). Portal vein, SMV, and splenic vein are patent. Nodes: No pathologically enlarged lymph nodes. Fluid: No free fluid. Bones/Soft Tissue: No acute fractures or aggressive osseous lesions are seen. Chronic L3 compression fracture status post kyphoplasty. Chronic T7 compression 
fracture status post kyphoplasty. There is nonspecific subcutaneous edema noted 
within the left lateral abdominal wall. IMPRESSION 1. No evidence of active GI bleeding. No evidence of acute process in the 
abdomen or pelvis. 2. Moderate left and small right pleural effusions with overlying lower lobe 
subsegmental atelectasis. Pulmonary interstitial and alveolar edema. 3. Left inguinal postoperative changes involving the left femoral vessels, with 
a small superficial fluid collection overlying the left femoral vessels 
measuring 2.1 x 1.4 cm. Correlate with history. Consider ultrasound to evaluate 
for pseudoaneurysm and/or hematoma. 4. Nonspecific subcutaneous edema in the left lateral abdominal wall. 
  
Recent Days: 
Recent Labs 03/02/21 
0225 03/01/21 
1712 03/01/21 
0500 WBC 11.5* 11.7* 12.6* HGB 8.6* 8.4* 8.4* HCT 29.0* 28.1* 27.9*  
 308 328 Recent Labs 03/02/21 
0225 03/01/21 
0500 02/28/21 
7134  139 137  
K 3.5 3.5 3.7  103 102 CO2 25 27 28 GLU 79 82 98 BUN 14 17 19 CREA 1.12* 1.26* 1.38* CA 7.5* 7.7* 8.0*  
 ALB 1.8* 1.8* 1.9* TBILI 0.6 0.8 0.6 ALT 11* 12 14 No results for input(s): PH, PCO2, PO2, HCO3, FIO2 in the last 72 hours. 24 Hour Results: 
Recent Results (from the past 24 hour(s)) GLUCOSE, POC Collection Time: 03/01/21  8:12 AM  
Result Value Ref Range Glucose (POC) 91 65 - 100 mg/dL Performed by [employer] GLUCOSE, POC Collection Time: 03/01/21 11:43 AM  
Result Value Ref Range Glucose (POC) 96 65 - 100 mg/dL Performed by [employer] GLUCOSE, POC Collection Time: 03/01/21  4:02 PM  
Result Value Ref Range Glucose (POC) 92 65 - 100 mg/dL Performed by [employer] CBC WITH AUTOMATED DIFF Collection Time: 03/01/21  5:12 PM  
Result Value Ref Range WBC 11.7 (H) 3.6 - 11.0 K/uL  
 RBC 2.90 (L) 3.80 - 5.20 M/uL HGB 8.4 (L) 11.5 - 16.0 g/dL HCT 28.1 (L) 35.0 - 47.0 % MCV 96.9 80.0 - 99.0 FL  
 MCH 29.0 26.0 - 34.0 PG  
 MCHC 29.9 (L) 30.0 - 36.5 g/dL  
 RDW 18.1 (H) 11.5 - 14.5 % PLATELET 993 911 - 731 K/uL MPV 8.9 8.9 - 12.9 FL  
 NRBC 0.0 0  WBC ABSOLUTE NRBC 0.00 0.00 - 0.01 K/uL NEUTROPHILS 74 32 - 75 % LYMPHOCYTES 8 (L) 12 - 49 % MONOCYTES 11 5 - 13 % EOSINOPHILS 5 0 - 7 % BASOPHILS 0 0 - 1 % IMMATURE GRANULOCYTES 2 (H) 0.0 - 0.5 % ABS. NEUTROPHILS 8.7 (H) 1.8 - 8.0 K/UL  
 ABS. LYMPHOCYTES 1.0 0.8 - 3.5 K/UL  
 ABS. MONOCYTES 1.3 (H) 0.0 - 1.0 K/UL  
 ABS. EOSINOPHILS 0.6 (H) 0.0 - 0.4 K/UL  
 ABS. BASOPHILS 0.1 0.0 - 0.1 K/UL  
 ABS. IMM. GRANS. 0.2 (H) 0.00 - 0.04 K/UL  
 DF AUTOMATED    
GLUCOSE, POC Collection Time: 03/01/21  9:10 PM  
Result Value Ref Range Glucose (POC) 90 65 - 100 mg/dL Performed by Katie Yoder CBC WITH AUTOMATED DIFF Collection Time: 03/02/21  2:25 AM  
Result Value Ref Range WBC 11.5 (H) 3.6 - 11.0 K/uL  
 RBC 3.00 (L) 3.80 - 5.20 M/uL HGB 8.6 (L) 11.5 - 16.0 g/dL HCT 29.0 (L) 35.0 - 47.0 %  MCV 96.7 80.0 - 99.0 FL  
 MCH 28.7 26.0 - 34.0 PG  
 MCHC 29.7 (L) 30.0 - 36.5 g/dL  
 RDW 18.3 (H) 11.5 - 14.5 % PLATELET 623 768 - 208 K/uL MPV 9.6 8.9 - 12.9 FL  
 NRBC 0.0 0  WBC ABSOLUTE NRBC 0.00 0.00 - 0.01 K/uL NEUTROPHILS 72 32 - 75 % LYMPHOCYTES 8 (L) 12 - 49 % MONOCYTES 12 5 - 13 % EOSINOPHILS 5 0 - 7 % BASOPHILS 1 0 - 1 % IMMATURE GRANULOCYTES 2 (H) 0.0 - 0.5 % ABS. NEUTROPHILS 8.3 (H) 1.8 - 8.0 K/UL  
 ABS. LYMPHOCYTES 1.0 0.8 - 3.5 K/UL  
 ABS. MONOCYTES 1.3 (H) 0.0 - 1.0 K/UL  
 ABS. EOSINOPHILS 0.6 (H) 0.0 - 0.4 K/UL  
 ABS. BASOPHILS 0.1 0.0 - 0.1 K/UL  
 ABS. IMM. GRANS. 0.2 (H) 0.00 - 0.04 K/UL  
 DF AUTOMATED METABOLIC PANEL, COMPREHENSIVE Collection Time: 03/02/21  2:25 AM  
Result Value Ref Range Sodium 138 136 - 145 mmol/L Potassium 3.5 3.5 - 5.1 mmol/L Chloride 104 97 - 108 mmol/L  
 CO2 25 21 - 32 mmol/L Anion gap 9 5 - 15 mmol/L Glucose 79 65 - 100 mg/dL BUN 14 6 - 20 MG/DL Creatinine 1.12 (H) 0.55 - 1.02 MG/DL  
 BUN/Creatinine ratio 13 12 - 20 GFR est AA 58 (L) >60 ml/min/1.73m2 GFR est non-AA 48 (L) >60 ml/min/1.73m2 Calcium 7.5 (L) 8.5 - 10.1 MG/DL Bilirubin, total 0.6 0.2 - 1.0 MG/DL  
 ALT (SGPT) 11 (L) 12 - 78 U/L  
 AST (SGOT) 21 15 - 37 U/L Alk. phosphatase 189 (H) 45 - 117 U/L Protein, total 4.9 (L) 6.4 - 8.2 g/dL Albumin 1.8 (L) 3.5 - 5.0 g/dL Globulin 3.1 2.0 - 4.0 g/dL A-G Ratio 0.6 (L) 1.1 - 2.2 SAMPLES BEING HELD Collection Time: 03/02/21  2:25 AM  
Result Value Ref Range SAMPLES BEING HELD 1SST,1BLU   
 COMMENT Add-on orders for these samples will be processed based on acceptable specimen integrity and analyte stability, which may vary by analyte. Medications reviewed Current Facility-Administered Medications Medication Dose Route Frequency  amiodarone (CORDARONE) 375 mg in dextrose 5% 250 mL infusion  1 mg/min IntraVENous TITRATE  mirtazapine (REMERON) tablet 15 mg  15 mg Oral QHS  ipratropium (ATROVENT) 0.02 % nebulizer solution 0.5 mg  0.5 mg Nebulization QID RT  
 PHENYLephrine (IVÁN-SYNEPHRINE) 100 mg in 0.9% sodium chloride 250 mL infusion   mcg/min IntraVENous TITRATE  
 0.9% sodium chloride infusion 250 mL  250 mL IntraVENous PRN  
 arformoteroL (BROVANA) neb solution 15 mcg  15 mcg Nebulization BID RT  
 budesonide (PULMICORT) 500 mcg/2 ml nebulizer suspension  500 mcg Nebulization BID RT  
 montelukast (SINGULAIR) tablet 10 mg  10 mg Oral QHS  meropenem (MERREM) 500 mg in sterile water (preservative free) 10 mL IV syringe  0.5 g IntraVENous Q8H  
 [Held by provider] dilTIAZem IR (CARDIZEM) tablet 30 mg  30 mg Oral TIDAC  nystatin (MYCOSTATIN) 100,000 unit/mL oral suspension 500,000 Units  500,000 Units Oral QID AFTER MEALS  
 amiodarone (CORDARONE) tablet 200 mg  200 mg Oral DAILY  [Held by provider] metoprolol tartrate (LOPRESSOR) tablet 25 mg  25 mg Oral Q6H  
 insulin lispro (HUMALOG) injection   SubCUTAneous AC&HS  
 glucose chewable tablet 16 g  4 Tab Oral PRN  
 dextrose (D50W) injection syrg 12.5-25 g  25-50 mL IntraVENous PRN  
 glucagon (GLUCAGEN) injection 1 mg  1 mg IntraMUSCular PRN  pantoprazole (PROTONIX) 40 mg in 0.9% sodium chloride 10 mL injection  40 mg IntraVENous Q12H  
 sodium chloride (NS) flush 5-40 mL  5-40 mL IntraVENous Q8H  
 sodium chloride (NS) flush 5-40 mL  5-40 mL IntraVENous PRN  
 acetaminophen (TYLENOL) tablet 650 mg  650 mg Oral Q6H PRN Or  
 acetaminophen (TYLENOL) suppository 650 mg  650 mg Rectal Q6H PRN  polyethylene glycol (MIRALAX) packet 17 g  17 g Oral DAILY PRN  
 bisacodyL (DULCOLAX) suppository 10 mg  10 mg Rectal DAILY PRN  
 ondansetron (ZOFRAN) injection 4 mg  4 mg IntraVENous Q6H PRN  
 0.9% sodium chloride infusion 250 mL  250 mL IntraVENous PRN  
 apixaban (ELIQUIS) tablet 5 mg  5 mg Oral BID Care Plan discussed with: Patient and Nurse Total time spent with patient: 30 minutes. Maylin Fernandes MD

## 2021-03-03 NOTE — PROGRESS NOTES
[facility]: \"I have wounds\" HPI: 70year old female with moderately severe COPD, CKD, afib on eliquis, HAYES who presented last week with anemia and dyspnea. Workup is ongoing into the etiology of her anemia. She is now requiring jimenez, however, and pulmonary has been asked to evaluate for possible ICU needs. Patient is alert and complaining about her wounds. States her breathing feels fine. Isn't very forthcoming with any history, however. She is followed in our office but hasn't been seen in nearly a year and is often quite sporadic with follow up. She is maintained on dulera, spiriva, PRN albuterol, singulair and O2. Also states she is compliant with CPAP at night but hasn't followed in sleep in a long time. PMH: Moderately severe COPD (FEV1 51% pred), chronic hypoxemic respiratory failure on 2-3L home O2 DM, CAD, GERD, DVT, HTN, HAYES on CPAP, afib in eliquis, HFpEF, obesity, appendectomy, hysterectomy, anemia SH: States she quit smoking \"a couple of years ago\" FH: DM, CAD Interval history Afebrile Hypotensive, but BP currently stable off jimenez with MAP 65-67 Back in SR and off amio drip Sats 95% on 3L WBC 11.5 - stable Hgb 8.7 - s/p 1 unit PRBC 2/28 Creat 0.96 
proBNP 2257 - worse Blood cx no growth x 4 days Urine cx with ESBL 
 
RUE PVR with severe peripheral arterial disease, mild in LUE 
 
ROS: Doesn't feel well again today. Says she had a terrible night and she wants to be left alone and not be checked on so much. Has been refusing turns, refuses breakfast, and refusing to work with therapy. Reports abd pain. PE:  
Vitals reviewed Gen: Chronically ill appearing, fatigued, appears uncomfortable HEENT: NCAT, EOMI Mouth: Moist oral mucosa, normal pharynx, crowded airway CV: current NST; in and out of afib over the weekend Lungs: CTA B/L, mildly diminished, some increased WOB 
GI: Soft, non tender, obese Ext: Moves all, lymphedema, chronic stasis changes, erythremic Neuro: Alert, answers questions appropriately, grossly nonfocal, poor insight Lab Results Component Value Date/Time WBC 8.9 03/03/2021 01:10 AM  
 Hemoglobin (POC) 13.9 10/09/2015 12:27 PM  
 HGB 8.7 (L) 03/03/2021 01:10 AM  
 Hematocrit (POC) 26 (L) 08/01/2019 11:41 AM  
 HCT 28.9 (L) 03/03/2021 01:10 AM  
 PLATELET 924 78/77/2903 01:10 AM  
 MCV 96.7 03/03/2021 01:10 AM  
 
Lab Results Component Value Date/Time Sodium 140 03/03/2021 01:10 AM  
 Potassium 3.5 03/03/2021 01:10 AM  
 Chloride 105 03/03/2021 01:10 AM  
 CO2 28 03/03/2021 01:10 AM  
 Anion gap 7 03/03/2021 01:10 AM  
 Glucose 93 03/03/2021 01:10 AM  
 BUN 11 03/03/2021 01:10 AM  
 Creatinine 0.96 03/03/2021 01:10 AM  
 BUN/Creatinine ratio 11 (L) 03/03/2021 01:10 AM  
 GFR est AA >60 03/03/2021 01:10 AM  
 GFR est non-AA 57 (L) 03/03/2021 01:10 AM  
 Calcium 7.6 (L) 03/03/2021 01:10 AM  
 
CXR Results  (Last 48 hours) None Impression: 
-hypotension, multifactorial with sepsis from UTI, cardiac disease, anemia.  
-moderately severe COPD not in acute exacerbation 
-chronic hypoxic respiratory failure 
-afib with RVR, HFpEF with volume overload 
-anemia -ESBL Plan: 
-jimenez if needed for goal MAP 65/. If gets to 100 mcg or greater will need ICU transfer. 
-continue antibiotics and f/u blood cx - on meropenem 
-anemia workup per GI/hematology. Still on eliquis. 
-cardiology following. Rate control with po amiodarone and BB. Lasix 40mg x 1 dose  Will need to closely follow BP. 
-continue Brovana/Pulmicort nebs and Atrovent nebs in substitution for home Spriva/Dulera 
-Continue O2 to keep sats > 90%; on 3L at baseline 
-Protonix -Palliative care following. Patient yesterday declined hospice and states she wants to remain full code. Tough situation with multi organ dysfunction making care difficult. Patient now refusing to care such as turning, eating, and therapy. Tells me she just wants to be left alone and sleep. Dicussed that if we focus on comfort measures, that would change the focus of our care and allow her to rest, focus on pain and dyspnea control, and not have invasive testing or frequent vitals. She indicated she does now want to focus on comfort care. I discussed this with the Palliative Care team. 
 
Darin Carpio PA-C [facility]

## 2021-03-03 NOTE — PROGRESS NOTES
Physical Therapy Attempted to see patient, she is currently declining. Spoke with Pallative NP who states they are having a family meeting this PM and may be going hospice/comfort. We will hold until final decisions have been made. Thank you.  
Nataliia Edmondson PT,DPT,NCS

## 2021-03-03 NOTE — CONSULTS
Palliative medicine update- Met with patient and daughter Spenser Lam, with patients son Crystal Melendez via speaker phone. Patient and family agree discharge back to [address] [place] [address] with Hospice. Patient elected DNR code status, a Durable DNR was completed, copy in EMR and original given to the patients daughter. Unit [facility] aware and will contact [address] [place] [address] to identify which Hospice organizations they use. [facility], [facility] are following on the periphery. Should [facility], mariam Duran is available to meet/talk with patient and family.  to assist with identifying if patient will need repeat COVID testing prior to discharge. Patient not strictly comfort only at this time. I think she is still processing the information,  her poor prognosis and what comfort care means and needs to make  transition in steps. She was agreeable to d/c abx, routine labs such as CBC/CMP and does not want escalation of care, no IV pressor support. .  However,  she does want to  continue diabetes management and cardiac meds. Patient now  with DNR/DNI. Patient with abdominal pain, she declined acetaminophen, asked for something \"stronger\". I placed order for Morphine 1mg IV every 4 hours as needed, mod to severe pain. Please contact me via [employer] with any questions or concerns.

## 2021-03-03 NOTE — PROGRESS NOTES
Daily Progress Note: 3/3/2021 Ricky Cole MD 
 
Assessment/Plan:  
Acute blood loss anemia due to GIB / Symptomatic anemia:   
-  transfuse as needed. GI consulted. -  Capsule study resulted - AVMs not currently bleeding 
  
Acute on chronic systolic CHF /  Elevated BNP/ A fib with RVR:   
-  Cardiology consulted.  
  
Dyspnea: waxes and wanes 
  
COPD: Appears to be stable and at baseline with no exacerbation. Continue with baseline oxygen supplementation and increase as needed. Scheduled and as needed bronchodilators. 
  
Atrial fibrillation ([address] (11/16/2018) on Eliquis:  
- per Cardiology 
  
DM type 2:  Monitor blood glucose levels with insulin sliding scale coverage. No basal coverage for now. Monitor for complications. UTI 
- urine culture with E Coli ESBL sensitive to Merrem 
  
Body mass index is 36.96 kg/m².: 30.0 - 39.9 Obese   
   
 
Problem List: 
Problem List as of 3/3/2021 Date Reviewed: 11/2/2020 Codes Class Noted - Resolved Elevated brain natriuretic peptide (BNP) level ICD-10-CM: R79.89 ICD-9-CM: 790.99  2/23/2021 - Present Acute blood loss anemia ICD-10-CM: D62 
ICD-9-CM: 285.1  2/23/2021 - Present Dyspnea ICD-10-CM: R06.00 
ICD-9-CM: 786.09  2/23/2021 - Present GIB (gastrointestinal bleeding) ICD-10-CM: K92.2 ICD-9-CM: 578.9  2/23/2021 - Present Symptomatic anemia ICD-10-CM: D64.9 ICD-9-CM: 285.9  2/23/2021 - Present Pneumonia ICD-10-CM: J18.9 ICD-9-CM: 230  1/26/2021 - Present SOB (shortness of breath) ICD-10-CM: R06.02 
ICD-9-CM: 786.05  1/26/2021 - Present Anemia ICD-10-CM: D64.9 ICD-9-CM: 285.9  1/26/2021 - Present Acute on chronic systolic CHF (congestive heart failure) (HCC) ICD-10-CM: O34.23 ICD-9-CM: 428.23, 428.0  1/26/2021 - Present Uncontrolled type 2 diabetes mellitus with hyperglycemia (HCC) ICD-10-CM: E11.65 ICD-9-CM: 250.02  1/26/2021 - Present Left lower lobe pneumonia ICD-10-CM: J18.9 ICD-9-CM: 092  1/1/2021 - Present Leukocytosis ICD-10-CM: G41.972 ICD-9-CM: 288.60  11/3/2020 - Present Chronic respiratory failure with hypoxia [facility]) ICD-10-CM: J96.11 
ICD-9-CM: 518.83, 799.02  11/2/2020 - Present Overview Signed 11/2/2020 10:12 PM by Estrellita Bailey MD  
  On 3L NC at home Cellulitis of lower extremity ICD-10-CM: L03.119 ICD-9-CM: 682.6  3/23/2020 - Present ASO (arteriosclerosis obliterans) ICD-10-CM: I70.90 ICD-9-CM: 440.9  9/5/2019 - Present PVD (peripheral vascular disease) ([facility] ICD-10-CM: I73.9 ICD-9-CM: 443.9  8/1/2019 - Present Severe obesity ([facility] ICD-10-CM: E66.01 
ICD-9-CM: 278.01  7/30/2019 - Present COPD (chronic obstructive pulmonary disease) ([facility]) ICD-10-CM: J44.9 ICD-9-CM: 251  7/13/2019 - Present Normocytic anemia ICD-10-CM: D64.9 ICD-9-CM: 285.9  7/13/2019 - Present PAD (peripheral artery disease) ([facility]) ICD-10-CM: I73.9 ICD-9-CM: 443.9  7/13/2019 - Present Mild intermittent asthma ICD-10-CM: J45.20 ICD-9-CM: 493.90  5/17/2019 - Present Brachial artery thrombus (HCC) ICD-10-CM: E34.7 ICD-9-CM: 444.21  4/26/2019 - Present Sleep apnea ICD-10-CM: G47.30 ICD-9-CM: 780.57  1/5/2019 - Present Overview Signed 1/5/2019  8:41 PM by Skip Vinay, DO  
  wears CPAP Atrial fibrillation [facility]) ICD-10-CM: I48.91 
ICD-9-CM: 427.31  11/16/2018 - Present Obesity (BMI 30-39.9) (Chronic) ICD-10-CM: S57.9 ICD-9-CM: 278.00  11/13/2018 - Present Recurrent deep vein thrombosis (DVT) (HCC) ICD-10-CM: I82.409 ICD-9-CM: 453.40  3/30/2018 - Present Chronic anticoagulation (Chronic) ICD-10-CM: Z79.01 
ICD-9-CM: V58.61  3/30/2018 - Present Essential hypertension (Chronic) ICD-10-CM: I10 
ICD-9-CM: 401.9  1/22/2016 - Present CAD (coronary artery disease) ICD-10-CM: I25.10 ICD-9-CM: 414.00  10/9/2015 - Present Type II diabetes mellitus (HCC) (Chronic) ICD-10-CM: E11.9 ICD-9-CM: 250.00  10/9/2015 - Present RESOLVED: Syncope and collapse ICD-10-CM: R55 
ICD-9-CM: 780.2  9/29/2020 - 11/2/2020 RESOLVED: Cellulitis ICD-10-CM: L03.90 ICD-9-CM: 682.9  3/23/2020 - 5/29/2020 RESOLVED: Hypokalemia ICD-10-CM: E87.6 ICD-9-CM: 276.8  3/23/2020 - 5/29/2020 RESOLVED: Hyponatremia ICD-10-CM: E87.1 ICD-9-CM: 276.1  3/23/2020 - 5/29/2020 RESOLVED: Diarrhea ICD-10-CM: R19.7 ICD-9-CM: 787.91  3/23/2020 - 5/29/2020 RESOLVED: Syncope and collapse ICD-10-CM: R55 
ICD-9-CM: 780.2  7/13/2019 - 5/29/2020 RESOLVED: UTI (urinary tract infection) ICD-10-CM: N39.0 ICD-9-CM: 599.0  7/13/2019 - 11/2/2020 RESOLVED: Sepsis ([facility] ICD-10-CM: A41.9 ICD-9-CM: 038.9, 995.91  7/13/2019 - 11/3/2020 RESOLVED: Leg wound, left ICD-10-CM: E42.947N ICD-9-CM: 891.0  7/13/2019 - 5/29/2020 RESOLVED: Leg laceration, right, initial encounter ICD-10-CM: L05.761N ICD-9-CM: 894.0  7/13/2019 - 5/29/2020 RESOLVED: Cellulitis of right upper arm ICD-10-CM: L03.113 ICD-9-CM: 682.3  5/17/2019 - 5/29/2020 RESOLVED: Gangrene of finger of right hand ([facility] ICD-10-CM: Z02 
ICD-9-CM: 785.4  5/17/2019 - 11/2/2020 RESOLVED: Bowel obstruction ([facility] ICD-10-CM: F33.803 ICD-9-CM: 560.9  1/8/2019 - 5/29/2020 RESOLVED: Partial small bowel obstruction ([facility] ICD-10-CM: K56.600 ICD-9-CM: 560.9  1/5/2019 - 5/29/2020 RESOLVED: Dyspnea ICD-10-CM: R06.00 
ICD-9-CM: 786.09  11/13/2018 - 5/29/2020 RESOLVED: ARF (acute renal failure) (HCC) ICD-10-CM: N17.9 ICD-9-CM: 584.9  11/13/2018 - 5/29/2020 RESOLVED: Closed wedge compression fracture of T7 vertebra ([facility] ICD-10-CM: G03.103D ICD-9-CM: 805.2  11/13/2018 - 5/29/2020  RESOLVED: Type 2 diabetes with nephropathy (HCC) ICD-10-CM: E11.21 
 ICD-9-CM: 250.40, 583.81  10/1/2018 - 11/2/2020 RESOLVED: Chest pain ICD-10-CM: R07.9 ICD-9-CM: 786.50  6/27/2018 - 5/29/2020 RESOLVED: Abdominal pain ICD-10-CM: R10.9 ICD-9-CM: 789.00  6/27/2018 - 5/29/2020 RESOLVED: Coronary artery disease involving native coronary artery without angina pectoris (Chronic) ICD-10-CM: I25.10 ICD-9-CM: 414.01  1/22/2016 - 11/2/2020 RESOLVED: HTN (hypertension) ICD-10-CM: I10 
ICD-9-CM: 401.9  10/9/2015 - 1/22/2016 RESOLVED: NSTEMI (non-ST elevated myocardial infarction) ([facility] ICD-10-CM: I21.4 ICD-9-CM: 410.70  10/9/2015 - 5/29/2020 Subjective:  
 70 y.o. female with history that includes DM 2, CAD with MI, COPD on 3LNC baseline, and A. fib on Eliquis who was recently admitted to [facility] with anemia, pneumonia, and acute on chronic CHF then transferred to rehab was sent back to the rehab due to anemia and dyspnea. She has been somewhat dyspneic for the past couple of days constant and worsening to the point of being severe even at rest with increased O2 requirements up to 4 LNC associated with orthopnea. She was found to have a hemoglobin of 6.8 at the rehab center. However, here in the ER she was found to have hemoglobin at 7.2 and she was back to her 3LNC. It was reported dark stools which tested FOBT positive. She is on Eliquis as mentioned. (Dr Javed Akins) 2/23:  Reports she if feeling better this AM with less SOB and O2 back to usual 3 liters. No ab pain. No CP. Sats ok on O2. Nurses report they are unable to get AM labs and pt needs central, PICC or mid line placed. She does not appear in any distress. Dr. Javed Akins has ordered blood and consented pt. Her chronic GI bleeding may warrant complete DC of anticoagulation - discussed with GI and little else to do. Also consulting Cards for her chronic decompensation of CHF/A fib episodes with mult readmissions - there may be little to do for this either. 2/24:  C/O mild nausea this AM and somewhat fatigued but otherwise little change. Rate has been up and down requiring does of IV dig and Amiodarone drip. Cards also consulting EP for poss ablation. Cards and GI agree with restart of Eliquis - risk of CVA > risk of bleeds. 2/25:  Still c/o nausea and now having loose stools - 3 overnight. Declines capsule study for GI bleeding. Also, at this time, reports she does not want to have pacer or any other studies/treatments and wants to consider Hospice. Discussed risks v benefits of continued work up and treatment of problems but she reports \"getting tired of having things done to me. \"  She wants to have an information session with Hospice for further consideration. 2/26:  No nausea at this moment. Needed Zofran once last night and had one loose stool. CT of ab/pelvis reveals nothing major. Capsule study of small bowel today. She does not need to be off tele for the study as she has hard wired tele and not remote tele. She reports she will do the capsule study and \"if nothing to fix then maybe do that Hospice. \"  Back in sinus rhythm at this moment. 2/27:  More shortness of breath and back in A fib again - rate 100s - 110s. No nausea at this moment. Tmax 101.1. Tnow 99. Repeat cultures pending. Check port CXR. Lungs sound more congested this AM.  Hb 7.4 this AM.  Will get Card to see again now that she is back in A fib with symptoms. 2/28:  Feels fatigued and sleepy. No CP or shortness of breath. BP lower last night and Roland had to be started. Urine culture 2/26/21 with 100K gram neg rods and Merrem started. May need to go to ICU. She is going to be diff to manage her fluids/BP due to A fib, CHF and other med problems. Blood culture neg so far. Tmax 99.1. Hb 7.3. May need to transfuse again with her low BP. Cardiology is seeing and Pul reconsulted. 3:  Feeling a little better today in general.  Less sleepy. She is not moving about much - encouraged her to work with PT/OT. BP trending better but still low - still on pressor. Dilt and Metoprolol being held. Urine culture with E Coli ESBL again but sensitive to Merrem. Blood cult neg so far. Transfused 1 unit PRCs yesterday. Tearful and wants to consider hospice but also admits to feeling depressed - will add Remeron as she is on QTc prolonging meds. Consult had been placed to Hospice several days ago for information session. 3/2:  Continues to report feeling better. BP better this AM - yesterday evening had worsening MAP and gave small fluid bolus and upped Roland to 110 - can now titrate again. Episode of rapid A fib last night and Cards gave Amio bolus and started Amio drip. Discussed results of capsule study with pt.   
 
3/3:  Feels worse today with return of nausea. No other complaints except \"just weak all over. \"   Back in sinus rhythm at this time. Off amio drip. Off pressor but BP still soft - may have to resume Roland. Larissa Ennisight discussion was held with pt about her medical problems and that most likely she is not going to improve to any significant degree - not very accepting of prognosis at this time. She did meet with hospice yesterday and still wishes to remain full code. Blood culture remains negative. Remains on Merrem for ESBL E Coli.   
  
Review of Systems: A comprehensive review of systems was negative except for that written in the HPI. Objective:  
Physical Exam:  
Visit Vitals /79 Pulse 88 Temp 98.6 °F (37 °C) Resp 24 Ht 5' 7\" (1.702 m) Wt 109.7 kg (241 lb 12.8 oz) SpO2 99% BMI 37.87 kg/m² O2 Flow Rate (L/min): 3 l/min O2 Device: Nasal cannula Temp (24hrs), Av.1 °F (36.7 °C), Min:97.7 °F (36.5 °C), Max:98.6 °F (37 °C) No intake/output data recorded.  1901 -  0700 In: 840 [P.O.:840] Out: 2500 [Urine:2500] General:  Alert, obese,cooperative, no distress, appears stated age. Head:  Normocephalic, without obvious abnormality, atraumatic. Eyes:  Conjunctivae/corneas clear. EOMs intact. Neck: Supple, symmetrical, trachea midline, no adenopathy, thyroid: no enlargement/tenderness/nodules, no carotid bruit and no JVD. Lungs:   Fairly clear except a few basilar rales bilaterally. Chest wall:  No tenderness or deformity. Heart:  Reg today with rate in 90s; 1/6 GRISELDA right 2nd ICS. Abdomen:   Soft, mild generalized tenderness without rebound or guarding. Bowel sounds normal. No masses,  No organomegaly. Extremities: no cyanosis. Trace LE edema on top of usual stasis changes. No calf tenderness or cords. Pulses: 2+ and symmetric all extremities. Skin:  turgor normal. No rashes other than stasis changes in LEs Neurologic:   Alert and oriented X 3. Fine motor of hands and fingers normal.   equal.  No cogwheeling or rigidity. Gait not tested at this time. Sensation grossly normal to touch. Gross motor of extremities normal.    
 
Data Review: EXAM:  CT ABD PELV W WO CONT 2/25/21 INDICATION: unexplained anemia and abd pain COMPARISON: CT abdomen pelvis 3/23/2020. CONTRAST:  100 mL of Isovue-370. 
 FINDINGS:  
Lower Thorax: 
Lung Bases: Moderate left and small right pleural effusions with overlying 
subsegmental atelectasis in the lower lobes. Bilateral interlobular septal 
thickening with scattered groundglass opacities. Heart: Unchanged cardiomegaly. No pericardial effusion. Coronary arteries stents 
noted. A vascular catheter terminates at the cavoatrial junction. Abdomen/Pelvis: 
Liver:  No focal liver lesions. Biliary system: Gallbladder is surgically absent. No intrahepatic or 
extrahepatic biliary ductal dilatation. Spleen: Normal. 
Pancreas: Normal. 
Kidneys/Ureters/Bladder: Multifocal cortical scarring in the kidneys.  No renal 
 masses. There is a 3 mm nonobstructing stone in the upper pole the right kidney, 
and a 2 mm nonobstructing stone in the lower pole the right kidney. No 
hydronephrosis or hydroureter. The bladder is normal. 
Adrenals: Normal. 
Stomach/bowel: No evidence of active GI bleeding. Incidental 2.1 cm gastric 
lipoma noted within the gastric body. No dilation or abnormal wall thickening is 
present. No free intraperitoneal air noted. Normal appendix. Fat-containing 
umbilical hernia, with postsurgical changes noted in the ventral abdominal wall. Reproductive Organs: Status post hysterectomy. No suspicious adnexal masses. Vasculature: Normal caliber arteries. Moderate calcific atherosclerosis of the 
abdominal aorta and iliac vasculature. There are recent postoperative changes in 
the left groin overlying the left femoral vessels, with prominent 
hyperdense/calcific material noted in this region and surgical clips. There is a 
small superficial fluid collection in the left groin measuring 2.1 x 1.4 cm 
(series 6 image 207). Portal vein, SMV, and splenic vein are patent. Nodes: No pathologically enlarged lymph nodes. Fluid: No free fluid. Bones/Soft Tissue: No acute fractures or aggressive osseous lesions are seen. Chronic L3 compression fracture status post kyphoplasty. Chronic T7 compression 
fracture status post kyphoplasty. There is nonspecific subcutaneous edema noted 
within the left lateral abdominal wall. IMPRESSION 1. No evidence of active GI bleeding. No evidence of acute process in the 
abdomen or pelvis. 2. Moderate left and small right pleural effusions with overlying lower lobe 
subsegmental atelectasis. Pulmonary interstitial and alveolar edema. 3. Left inguinal postoperative changes involving the left femoral vessels, with 
a small superficial fluid collection overlying the left femoral vessels 
measuring 2.1 x 1.4 cm. Correlate with history. Consider ultrasound to evaluate for pseudoaneurysm and/or hematoma. 4. Nonspecific subcutaneous edema in the left lateral abdominal wall. 
  
Recent Days: 
Recent Labs 03/03/21 
0110 03/02/21 0225 03/01/21 
1712 WBC 8.9 11.5* 11.7* HGB 8.7* 8.6* 8.4* HCT 28.9* 29.0* 28.1*  
 329 308 Recent Labs 03/03/21 
0110 03/02/21 0225 03/01/21 
0500  138 139  
K 3.5 3.5 3.5  104 103 CO2 28 25 27 GLU 93 79 82 BUN 11 14 17 CREA 0.96 1.12* 1.26* CA 7.6* 7.5* 7.7* ALB 1.7* 1.8* 1.8* TBILI 0.5 0.6 0.8 ALT 10* 11* 12 No results for input(s): PH, PCO2, PO2, HCO3, FIO2 in the last 72 hours. 24 Hour Results: 
Recent Results (from the past 24 hour(s)) GLUCOSE, POC Collection Time: 03/02/21  8:32 AM  
Result Value Ref Range Glucose (POC) 102 (H) 65 - 100 mg/dL Performed by Alex Singh GLUCOSE, POC Collection Time: 03/02/21 11:10 AM  
Result Value Ref Range Glucose (POC) 120 (H) 65 - 100 mg/dL Performed by Alex Singh GLUCOSE, POC Collection Time: 03/02/21  4:04 PM  
Result Value Ref Range Glucose (POC) 103 (H) 65 - 100 mg/dL Performed by Alex Singh UPPER EXT ART PVR MULT LEVEL SEG PRESSURES Collection Time: 03/02/21  5:20 PM  
Result Value Ref Range Left arm .0 mmHg Right arm .0 mmHg Left Prox Radial A  mmHg Right Prox Radial A BP 35 mmHg Right Prox Ulnar A BP 59 mmHg Left 2nd Digit BP 49 mmHg Right 2nd Digit BP 38 mmHg Right WBI 0.46 Left WBI 0.82 GLUCOSE, POC Collection Time: 03/02/21  8:48 PM  
Result Value Ref Range Glucose (POC) 107 (H) 65 - 100 mg/dL Performed by LARS SALVADOR   
CBC WITH AUTOMATED DIFF Collection Time: 03/03/21  1:10 AM  
Result Value Ref Range WBC 8.9 3.6 - 11.0 K/uL  
 RBC 2.99 (L) 3.80 - 5.20 M/uL HGB 8.7 (L) 11.5 - 16.0 g/dL HCT 28.9 (L) 35.0 - 47.0 % MCV 96.7 80.0 - 99.0 FL  
 MCH 29.1 26.0 - 34.0 PG  
 MCHC 30.1 30.0 - 36.5 g/dL RDW 18.1 (H) 11.5 - 14.5 % PLATELET 190 182 - 581 K/uL MPV 9.6 8.9 - 12.9 FL  
 NRBC 0.0 0  WBC ABSOLUTE NRBC 0.00 0.00 - 0.01 K/uL NEUTROPHILS 72 32 - 75 % LYMPHOCYTES 11 (L) 12 - 49 % MONOCYTES 9 5 - 13 % EOSINOPHILS 5 0 - 7 % BASOPHILS 1 0 - 1 % IMMATURE GRANULOCYTES 2 (H) 0.0 - 0.5 % ABS. NEUTROPHILS 6.5 1.8 - 8.0 K/UL  
 ABS. LYMPHOCYTES 1.0 0.8 - 3.5 K/UL  
 ABS. MONOCYTES 0.8 0.0 - 1.0 K/UL  
 ABS. EOSINOPHILS 0.5 (H) 0.0 - 0.4 K/UL  
 ABS. BASOPHILS 0.1 0.0 - 0.1 K/UL  
 ABS. IMM. GRANS. 0.2 (H) 0.00 - 0.04 K/UL  
 DF AUTOMATED METABOLIC PANEL, COMPREHENSIVE Collection Time: 03/03/21  1:10 AM  
Result Value Ref Range Sodium 140 136 - 145 mmol/L Potassium 3.5 3.5 - 5.1 mmol/L Chloride 105 97 - 108 mmol/L  
 CO2 28 21 - 32 mmol/L Anion gap 7 5 - 15 mmol/L Glucose 93 65 - 100 mg/dL BUN 11 6 - 20 MG/DL Creatinine 0.96 0.55 - 1.02 MG/DL  
 BUN/Creatinine ratio 11 (L) 12 - 20 GFR est AA >60 >60 ml/min/1.73m2 GFR est non-AA 57 (L) >60 ml/min/1.73m2 Calcium 7.6 (L) 8.5 - 10.1 MG/DL Bilirubin, total 0.5 0.2 - 1.0 MG/DL  
 ALT (SGPT) 10 (L) 12 - 78 U/L  
 AST (SGOT) 20 15 - 37 U/L Alk. phosphatase 188 (H) 45 - 117 U/L Protein, total 4.1 (L) 6.4 - 8.2 g/dL Albumin 1.7 (L) 3.5 - 5.0 g/dL Globulin 2.4 2.0 - 4.0 g/dL A-G Ratio 0.7 (L) 1.1 - 2.2 Medications reviewed Current Facility-Administered Medications Medication Dose Route Frequency  metoprolol tartrate (LOPRESSOR) tablet 25 mg  25 mg Oral Q12H  digoxin (LANOXIN) injection 125 mcg  125 mcg IntraVENous ONCE PRN  mirtazapine (REMERON) tablet 15 mg  15 mg Oral QHS  ipratropium (ATROVENT) 0.02 % nebulizer solution 0.5 mg  0.5 mg Nebulization QID RT  
 0.9% sodium chloride infusion 250 mL  250 mL IntraVENous PRN  
 arformoteroL (BROVANA) neb solution 15 mcg  15 mcg Nebulization BID RT  
  budesonide (PULMICORT) 500 mcg/2 ml nebulizer suspension  500 mcg Nebulization BID RT  
 montelukast (SINGULAIR) tablet 10 mg  10 mg Oral QHS  meropenem (MERREM) 500 mg in sterile water (preservative free) 10 mL IV syringe  0.5 g IntraVENous Q8H  
 nystatin (MYCOSTATIN) 100,000 unit/mL oral suspension 500,000 Units  500,000 Units Oral QID AFTER MEALS  
 amiodarone (CORDARONE) tablet 200 mg  200 mg Oral DAILY  insulin lispro (HUMALOG) injection   SubCUTAneous AC&HS  
 glucose chewable tablet 16 g  4 Tab Oral PRN  
 dextrose (D50W) injection syrg 12.5-25 g  25-50 mL IntraVENous PRN  
 glucagon (GLUCAGEN) injection 1 mg  1 mg IntraMUSCular PRN  pantoprazole (PROTONIX) 40 mg in 0.9% sodium chloride 10 mL injection  40 mg IntraVENous Q12H  
 sodium chloride (NS) flush 5-40 mL  5-40 mL IntraVENous Q8H  
 sodium chloride (NS) flush 5-40 mL  5-40 mL IntraVENous PRN  
 acetaminophen (TYLENOL) tablet 650 mg  650 mg Oral Q6H PRN Or  
 acetaminophen (TYLENOL) suppository 650 mg  650 mg Rectal Q6H PRN  polyethylene glycol (MIRALAX) packet 17 g  17 g Oral DAILY PRN  
 bisacodyL (DULCOLAX) suppository 10 mg  10 mg Rectal DAILY PRN  
 ondansetron (ZOFRAN) injection 4 mg  4 mg IntraVENous Q6H PRN  
 0.9% sodium chloride infusion 250 mL  250 mL IntraVENous PRN  
 apixaban (ELIQUIS) tablet 5 mg  5 mg Oral BID Care Plan discussed with: Patient and Nurse Total time spent with patient: 30 minutes.  
Ferny Shelton MD

## 2021-03-03 NOTE — PROGRESS NOTES
Transition Plan of Care RUR 51% 4:13 PM 
Confirmed with [employer] they do go to [address] [place] [address] waiting to hear back from [facility] regarding if she has a bed available for the pt. ERIC Mistry 
 
 
 
3:51 PM 
Patient will now go back to [facility]. I called [facility] at [address] [place] [address] to confirm which Hospice they like to use. Waiting to hear back from her to set patient up with Hospice. ERIC Mistry Plan: 1. Monitor patients response to treatment 2. Patient is on 6L O2. 
3. Patient remains Full code and refused Hospice yesterday after Hospice info session. 4. Feels worse today with return of nausea. 5. Patient is back in sinus rhythm at this time. Off amio drip. Off pressor but BP still soft - may have to resume Roland. 
6. Blood culture remains negative. Remains on Merrem for ESBL E Coli. 7. Case Management to follow.

## 2021-03-03 NOTE — PROGRESS NOTES
0700 
Bedside and Verbal shift change report given to Oralia Galeas (oncoming nurse) by Farzana Delarosa (offgoing nurse). Report included the following information SBAR, Kardex, Procedure Summary, Intake/Output, MAR, Accordion, Recent Results and Cardiac Rhythm NSR. Initial assessment done. AOX4. No complaints of pain. Bed alarm on. Will continue to monitor. This patient was assisted with Intentional Toileting every 2 hours during this shift. Documentation of ambulation and output reflected on Flowsheet. Central line Type: triple lumen right IJ Central Line Insert Date: 03/01/21 Reason Central Line Placed: Limited access Central Line Dressing Date: 03/01/21 Biopatch in place? Yes No: yes Tubing labeled and appropriate? Yes No: yes Alcohol caps on all open ports? Yes No: yes Last CHG bath (time&date): 03/02/21 2200 Reviewed with provider and central line must stay in for the following reasons: limited access Visit Vitals BP (!) 88/53 (BP 1 Location: Right upper arm, BP Patient Position: At rest) Pulse 79 Temp 99.5 °F (37.5 °C) Resp 23 Ht 5' 7\" (1.702 m) Wt 109.7 kg (241 lb 12.8 oz) SpO2 95% BMI 37.87 kg/m²  
 
1900

## 2021-03-03 NOTE — PROGRESS NOTES
[facility] Dosing Services: 3/3/2021 Pharmacist Renal Dosing Progress Note The following medication: Meropenem was automatically dose-adjusted per [facility] P&T Committee Protocol, with respect to renal function. Indication: UTI Pt Weight:  
Wt Readings from Last 1 Encounters:  
03/03/21 109.7 kg (241 lb 12.8 oz) Dosage changed to:  Meropenem 500 mg every 6 hours   GFR > 50 Previous Regimen 500 mg every 8 hours Serum Creatinine Lab Results Component Value Date/Time Creatinine 0.96 03/03/2021 01:10 AM  
 Creatinine (POC) 1.1 08/01/2019 11:41 AM  
   
Creatinine Clearance Estimated Creatinine Clearance: 66.6 mL/min (based on SCr of 0.96 mg/dL). BUN Lab Results Component Value Date/Time BUN 11 03/03/2021 01:10 AM  
 BUN (POC) 22 (H) 08/01/2019 11:41 AM  
   
 
Pharmacy to continue to monitor patient daily. Will make dosage adjustments based upon changing renal function. Signed Monie Vizcaino. Contact information:  [phone]

## 2021-03-03 NOTE — PROGRESS NOTES
Palliative Medicine Consult Vin: [phone] ([phone]) Patient Name: aP Simeon YOB: 1945 Date of Initial Consult: 2/26/2021 Reason for Consult: [address] discussion Requesting Provider: Dr. Grecia Martínez Primary Care Physician: Mary Arcos MD 
 
 SUMMARY:  
Pa Simeon is a 70 y.o. with a past history of DM, MI, CHF, HTN, COPD on 3 L NC, DVT, A. fib on Eliquis, anemia of unknown etiology, obesity, CAD, PAD, sleep apnea with CPAP and history of bowel resection. Patient presented to the ER from [facility], with CC of shortness of breath x1 day, a hemoglobin of 6.8 and an abnormal CXR. In the ER patient patients BP was soft. She was tachycardic and in A. fib, started on amiodarone drip. She was also requiring slightly more O2 support than baseline, with 4 L nasal cannula. . In ER her hemoglobin was actually 7.2, however she was FOBT positive. Labs also significant for leukocytosis, mildly elevated creatinine and albumin 2.4. CXR significant for cardiomegaly and small bilateral pleural effusions and mild pulmonary edema. Patient transfused 1 unit PRBCs and was admitted on 2/22/2021 f with a diagnosis of GI bleed. Chart reviewed- 
 
Palliative medicine team has not met with  Ms. Pierre Barton before today, however this is her 6th hospitalization over the past 12 months. 
 
-3/233/31/2020- SIRS due to lower extremity cellulitis 
-5/296/2/2020-sepsis 2/2 UTI, +kleb and Ecoli ESBL. -9/2910/12/2020 for syncope and collapse, likely due to hypotension from anemia. -11/211/6/2020 with sepsis secondary to LLE cellulitis 1/262/9/2021 with anemia, acute on chronic CHF and PNA with associated respiratory failure. - 1/11/15/2021 with left lobe PNA, GI bleed and A. fib with RVR. Course of hospitalization:   Initially had ongoing issues with PAF requiring amiodarone drip and digoxin, was considering possible pacemaker and AV node ablation however on 2/24 she converted to NSR and pacer put on hold. Patient anemic however hemoglobin fairly stable, has not required additional transfusions. On 2/26 she underwent GI PillCam, results pending. 2/26 UA positive UTI. Current medical issues leading to Palliative Medicine involvement include: GOC Discussion PALLIATIVE DIAGNOSES:  
 
1. Concern about end of life 2. Weakness, generalized 3. Debility 4. Poor nutrition 5. Hypoalbuminemia 6. DNR discussion 7. Goals of care discussion PLAN:  
1. Prior to visit I spoke with patient's nurse Jazz Do RN as well as PT Key Berg. 2. Patient refusing therapy, refusing repositioning with turn team and not eating. 3. Patient initially  seen, no family at bedside. 4. We briefly discussed patients declining health further complicated by refusal to participate in therapy, refusing repositioning and extremely poor nutrition/intake. Patient stated \"Im not going to get better\", at which time I suggested that she transition focus of care and treatment to one of comfort, patient verbalized her agreement. Rosanna Og 5. I called patient daughter Amy Gonzalez, who was at work, but  Was able to meet bedside at 1130. During meeting, patients son, Priscilla Martinez participated via speaker phone. 6. We discussed patients advanced age,  multiple co morbidities, frequent hospitalizations and continued functional decline despite hospitalizations, and that pattern will continue, with little hope of improving. Rosanna Og 7. Patient verbalized her understanding and does not want to keep returning to hospital if they are unable to [facility] her better\". 8. Patient and family in agreement to discharge back to [address] [place] [address] with Hospice. 9. Patient not quite ready for strictly comfort only care today.  I think she is still processing the information,  her poor prognosis and what comfort care means. She wants to  make  transition in steps.  Patient agreeable to discontinuation of  abx, routine labs such as CBC/CMP and does not want escalation of care, no IV pressor support. .  However,  patient  does want to  continue diabetes management and cardiac meds. 
10. Patient with abdominal pain, she declined acetaminophen, asked for something \"stronger\". I placed order for Morphine 1mg IV every 4 hours as needed, mod to severe pain. 
11. DNR Discussion- Patient agreeable to DNR/DNI code status. Durable DNR completed, copy placed in chart to be scanned to EMR and original given to the patients daughter.  
12. Unit CM Christina aware and will contact [place] to identify which Hospice organizations they use. 
13.  to assist with identifying if patient will need repeat COVID testing prior to discharge. 
14. [facility] met with the patient 3/2, they are following on the periphery.  Should [facility] except  Hospice, liaisosn Traci Demarco is available to meet/talk with patient and family. 
  
 
 
. 
 
15. Initial consult note routed to primary continuity provider and/or primary health care team members 
16. Communicated plan of care with: Palliative IDT, Hospital Health Care Team, Justine AJ, unit JERONIMO Vasquez, Hospice liaison Libby Demarco 
 
 GOALS OF CARE / TREATMENT PREFERENCES:  
 
GOALS OF CARE: 
Patient/Health Care Proxy Stated Goals: Comfort 
 
TREATMENT PREFERENCES:  
Code Status: DNR 
 
Advance Care Planning: 
[x] [facility] Interdisciplinary Team has updated the ACP Navigator with Health Care Decision Maker and Patient Capacity 
 
  Primary Decision Maker: Jayla Richardson - Daughter - [phone] 
Advance Care Planning 2/25/2021  
Patient's Healthcare Decision Maker is: -  
Primary Decision Maker Name -  
Confirm Advance Directive None  
 Patient Would Like to Complete Advance Directive No  
Does the patient have other document types - Medical Interventions: Limited additional interventions Other Instructions:  
Artificially Administered Nutrition: No feeding tube Other: As far as possible, the palliative care team has discussed with patient / health care proxy about goals of care / treatment preferences for patient. HISTORY:  
 
History obtained from: medical records/ nurse/ patient CHIEF COMPLAINT: I have abdominal pain HPI/SUBJECTIVE: The patient is:  
[x] Verbal and participatory [] Non-participatory due to:  
Patient stated that she has abdominal pain, see blow for info. She reported feeling tired, no appetite, does not want anything to eat, nothing seems appealing. 2/26- CXR increasing pulmonary edema and persistent  left pleural effusion and  LL atelectaisis 2/27- Back in afib, increased SOB, temp 101.1, repeat cultures pending. CXR +cardiomegaly, +Mild pulmonary edema and possible left pleural effusion without significant change. 2/28- Hypotensive, roland started. Urine cx + gram neg, started on Merrem. Hgb down 7.3, will need transfusion. Pulmonal reconsulted, patient w/ mod to severe COPD, hypotension 2/2 sepsis, cardiac dx, anemia, now in afib with rvr and severe hypoalbuminemia.  with volume overload 3/1- Maxed on roland, patient poor intake, severe hypoalbuminemia, 3L nasal canula, Pill cam resulted, + multiple AVMs, however no active bleeding . 3/2- Required amiodarone drip earlier in day, has since stopped. Roland stopped this am. On 3L nasal canula Clinical Pain Assessment (nonverbal scale for severity on nonverbal patients):  
Clinical Pain Assessment Severity: 5 Location: abdomen Character: unable to describe Duration: unable to describe Effect: unable to describe Factors: unable to describe Frequency: unable to describe Activity (Movement): Lying quietly, normal position Duration: for how long has pt been experiencing pain (e.g., 2 days, 1 month, years) Frequency: how often pain is an issue (e.g., several times per day, once every few days, constant) FUNCTIONAL ASSESSMENT:  
 
Palliative Performance Scale (PPS): PPS: 30 
 
 
 PSYCHOSOCIAL/SPIRITUAL SCREENING:  
 
Palliative IDT has assessed this patient for cultural preferences / practices and a referral made as appropriate to needs (Cultural Services, Patient Advocacy, Ethics, etc.) Any spiritual / Church concerns: 
[] Yes /  [x] No 
 
Caregiver Burnout: 
[] Yes /  [x] No /  [] No Caregiver Present Anticipatory grief assessment:  
[x] Normal  / [] Maladaptive ESAS Anxiety: Anxiety: 0 
 
ESAS Depression: Depression: 0 REVIEW OF SYSTEMS:  
 
Positive and pertinent negative findings in ROS are noted above in HPI. The following systems were [x] reviewed / [] unable to be reviewed as noted in HPI Other findings are noted below. Systems: constitutional, ears/nose/mouth/throat, respiratory, gastrointestinal, genitourinary, musculoskeletal, integumentary, neurologic, psychiatric, endocrine. Positive findings noted below. Modified ESAS Completed by: provider Fatigue: 10 Drowsiness: 7 Depression: 0 Pain: 5 Anxiety: 0 Nausea: 0 Anorexia: 10 Dyspnea: 0 Stool Occurrence(s): 1 PHYSICAL EXAM:  
 
From RN flowsheet: 
Wt Readings from Last 3 Encounters:  
03/03/21 241 lb 12.8 oz (109.7 kg) 02/15/21 232 lb (105.2 kg) 02/08/21 254 lb (115.2 kg) Blood pressure (!) 110/42, pulse 86, temperature 98.7 °F (37.1 °C), resp. rate 30, height 5' 7\" (1.702 m), weight 241 lb 12.8 oz (109.7 kg), SpO2 99 %. Pain Scale 1: Numeric (0 - 10) Pain Intensity 1: 0 Pain Onset 1: acute Pain Location 1: Abdomen Pain Orientation 1: Mid 
Pain Description 1: Aching Pain Intervention(s) 1: Medication (see MAR) Last bowel movement, if known: Constitutional: Appears stated age, large body habitus,fatigued, weakness, NAD Eyes: pupils equal, anicteric, watery ENMT: no nasal discharge, very dry mucous membranes Cardiovascular: regular rhythm, distal pulses intact Respiratory: mildly labored, symmetric, pursed lip breathing Gastrointestinal: large, soft non-tender, +bowel sounds Musculoskeletal:  no deformity, no tenderness to palpation Skin: warm, dry, edema in upper and lower extremities Neurologic: intermittently drowsy, did wake and participate in discussion, is able to follow commands, moving all extremities Psychiatric: restricted affect, no hallucinations Other: 
 
 
 HISTORY:  
 
Active Problems: 
  Type II diabetes mellitus ([address] (10/9/2015) Essential hypertension (1/22/2016) Atrial fibrillation ([address] (11/16/2018) COPD (chronic obstructive pulmonary disease) ([address] (7/13/2019) Acute on chronic systolic CHF (congestive heart failure) ([address] (1/26/2021) Elevated brain natriuretic peptide (BNP) level (2/23/2021) Acute blood loss anemia (2/23/2021) Dyspnea (2/23/2021) GIB (gastrointestinal bleeding) (2/23/2021) Symptomatic anemia (2/23/2021) Past Medical History:  
Diagnosis Date  Asthma  Atrial fibrillation ([address] 11/16/2018  Autoimmune disease ([address]   
 fibromyalgia  CAD (coronary artery disease) 10/9/2015  Closed wedge compression fracture of T7 vertebra ([address] 11/13/2018  COPD (chronic obstructive pulmonary disease) (HCC)  Diabetes ([address]  DVT (deep vein thrombosis) in pregnancy  GERD (gastroesophageal reflux disease)  Heart failure ([address]  History of vascular access device 09/30/2020  
 4f midline, 12cm at 1cm out placed in L Cephalic by Sanjay Argueta RN  
 HTN (hypertension)  NSTEMI (non-ST elevated myocardial infarction) ([address] 10/9/2015  Obesity (BMI 30-39.9) 11/13/2018  PAD (peripheral artery disease) ([address] prior stenting  Sleep apnea wears CPAP  
 Statin intolerance Past Surgical History:  
Procedure Laterality Date  COLONOSCOPY N/A 1/4/2021 COLONOSCOPY performed by Joey Sierra MD at [facility]  HX COLOSTOMY    
 and reversal, post episiotomy repair [address]  HX UROLOGICAL  2009  
 bladder sling  IR KYPHOPLASTY LUMBAR  10/8/2020  KS ABDOMEN SURGERY PROC UNLISTED    
 hital hernia repair, gall bladder removed  KS AMPUTATION TRANSMETACARPAL Right 06/12/2019  
 entire ring finger, 2nd & 3rd fingers (transmetacarpal)  KS BREAST SURGERY PROCEDURE UNLISTED    
 lumpectomy  KS CARDIAC SURG PROCEDURE UNLIST  10/9/15  
 2 stents Family History Problem Relation Age of Onset  Diabetes Mother  Heart Failure Mother  Kidney Disease Mother  Diabetes Father  Heart Disease Father  Elevated Lipids Father  Heart Failure Father  Heart Disease Brother  Cancer Brother   
     kidney  Diabetes Brother  No Known Problems Brother  No Known Problems Brother History reviewed, no pertinent family history. Social History Tobacco Use  Smoking status: Former Smoker Quit date: 3/30/2017 Years since quitting: 3.9  Smokeless tobacco: Never Used Substance Use Topics  Alcohol use: No  
  Alcohol/week: 0.0 standard drinks Comment: very very rarely Allergies Allergen Reactions  Advair Diskus [Fluticasone Propion-Salmeterol] Rash  Aspartame Other (comments)  Breo Ellipta [Fluticasone Furoate-Vilanterol] Rash  Bumex [Bumetanide] Myalgia  Ciprofloxacin Rash  Statins-Hmg-Coa Reductase Inhibitors Other (comments) Muscle pain Lipitor/crestor/zocor  Sulfa (Sulfonamide Antibiotics) Rash  Tetracycline Other (comments) musclepain  Torsemide Rash and Myalgia  Zetia [Ezetimibe] Myalgia Current Facility-Administered Medications Medication Dose Route Frequency  metoprolol tartrate (LOPRESSOR) tablet 25 mg  25 mg Oral Q6H  
 morphine injection 1 mg  1 mg IntraVENous Q4H PRN  
 digoxin (LANOXIN) injection 125 mcg  125 mcg IntraVENous ONCE PRN  mirtazapine (REMERON) tablet 15 mg  15 mg Oral QHS  ipratropium (ATROVENT) 0.02 % nebulizer solution 0.5 mg  0.5 mg Nebulization QID RT  
 0.9% sodium chloride infusion 250 mL  250 mL IntraVENous PRN  
 arformoteroL (BROVANA) neb solution 15 mcg  15 mcg Nebulization BID RT  
 budesonide (PULMICORT) 500 mcg/2 ml nebulizer suspension  500 mcg Nebulization BID RT  
 montelukast (SINGULAIR) tablet 10 mg  10 mg Oral QHS  nystatin (MYCOSTATIN) 100,000 unit/mL oral suspension 500,000 Units  500,000 Units Oral QID AFTER MEALS  
 amiodarone (CORDARONE) tablet 200 mg  200 mg Oral DAILY  insulin lispro (HUMALOG) injection   SubCUTAneous AC&HS  
 glucose chewable tablet 16 g  4 Tab Oral PRN  
 dextrose (D50W) injection syrg 12.5-25 g  25-50 mL IntraVENous PRN  
 glucagon (GLUCAGEN) injection 1 mg  1 mg IntraMUSCular PRN  pantoprazole (PROTONIX) 40 mg in 0.9% sodium chloride 10 mL injection  40 mg IntraVENous Q12H  
 sodium chloride (NS) flush 5-40 mL  5-40 mL IntraVENous Q8H  
 sodium chloride (NS) flush 5-40 mL  5-40 mL IntraVENous PRN  
 acetaminophen (TYLENOL) tablet 650 mg  650 mg Oral Q6H PRN Or  
 acetaminophen (TYLENOL) suppository 650 mg  650 mg Rectal Q6H PRN  polyethylene glycol (MIRALAX) packet 17 g  17 g Oral DAILY PRN  
 bisacodyL (DULCOLAX) suppository 10 mg  10 mg Rectal DAILY PRN  
 ondansetron (ZOFRAN) injection 4 mg  4 mg IntraVENous Q6H PRN  
 0.9% sodium chloride infusion 250 mL  250 mL IntraVENous PRN  
 apixaban (ELIQUIS) tablet 5 mg  5 mg Oral BID  
 
 
 
 LAB AND IMAGING FINDINGS:  
 
Lab Results Component Value Date/Time WBC 8.9 03/03/2021 01:10 AM  
 HGB 8.7 (L) 03/03/2021 01:10 AM  
 PLATELET 224 74/29/5709 01:10 AM  
 
Lab Results Component Value Date/Time Sodium 140 03/03/2021 01:10 AM  
 Potassium 3.5 03/03/2021 01:10 AM  
 Chloride 105 03/03/2021 01:10 AM  
 CO2 28 03/03/2021 01:10 AM  
 BUN 11 03/03/2021 01:10 AM  
 Creatinine 0.96 03/03/2021 01:10 AM  
 Calcium 7.6 (L) 03/03/2021 01:10 AM  
 Magnesium 2.2 02/23/2021 09:19 PM  
 Phosphorus 2.2 (L) 01/03/2021 01:59 AM  
  
Lab Results Component Value Date/Time Alk. phosphatase 188 (H) 03/03/2021 01:10 AM  
 Protein, total 4.1 (L) 03/03/2021 01:10 AM  
 Albumin 1.7 (L) 03/03/2021 01:10 AM  
 Globulin 2.4 03/03/2021 01:10 AM  
 
Lab Results Component Value Date/Time INR 1.1 02/22/2021 10:46 PM  
 Prothrombin time 11.1 02/22/2021 10:46 PM  
 aPTT 26.1 02/22/2021 10:46 PM  
  
Lab Results Component Value Date/Time Iron 24 (L) 02/25/2021 05:45 AM  
 TIBC 217 (L) 02/25/2021 05:45 AM  
 Iron % saturation 11 (L) 02/25/2021 05:45 AM  
 Ferritin 105 02/25/2021 05:45 AM  
  
No results found for: PH, PCO2, PO2 No components found for: [place] Lab Results Component Value Date/Time CK 18 (L) 05/21/2019 06:13 AM  
 CK - MB 1.1 11/13/2018 03:26 PM  
  
 
 
   
 
Total time: 65 min Counseling / coordination time, spent as noted above: 55 min 
> 50% counseling / coordination?: yes Prolonged service was provided for  []30 min   []75 min in face to face time in the presence of the patient, spent as noted above. Time Start:  
Time End:  
Note: this can only be billed with 44197 (initial) or 83359 (follow up). If multiple start / stop times, list each separately.

## 2021-03-03 NOTE — PROGRESS NOTES
Cardiology Progress Note [address] [place], [address] [facility] Phone [phone]; Fax [phone] 
 
 
 
3/3/2021 9:13 AM  
 
Admit Date:           2021 Admit Diagnosis:  Symptomatic anemia [D64.9] GIB (gastrointestinal bleeding) [K92.2] Acute blood loss anemia [D62] Dyspnea [R06.00] :          1945 MRN:          [id number] Impression Plan/Recommendation PAF Urosepsis/hypotension AVMs Anemia EDWARDS Chronic resp failure on 3 L home O2 Copd Chronic HFpEF Depression Deconditioned · Af RVR overnight- at 0100- then converted back to NSR. Increase metoprolol to q6H 
· Continue PO amiodarone and eliquis · Good response to IV lasix, still appears dyspenic this AM- will try another dose of IV lasix · Palliative care following - patient wishes to remain a full code and declines hospice at this time. However this morning she tells us she wants to transition to comfort. Reviewed plan with patient and RN No intake/output data recorded. Last 3 Recorded Weights in this Encounter 21 0500 21 0340 21 0330 Weight: 242 lb 1 oz (109.8 kg) 239 lb 11.2 oz (108.7 kg) 241 lb 12.8 oz (109.7 kg)  1901 -  0700 In: 840 [P.O.:840] Out: 2500 [Urine:2500] SUBJECTIVE Sharlon Bellis feels more SOB this AM 
Has belly pain Wants to be left alone Does not want to eat Wants to move to comfort Current Facility-Administered Medications Medication Dose Route Frequency  furosemide (LASIX) injection 40 mg  40 mg IntraVENous ONCE  
 metoprolol tartrate (LOPRESSOR) tablet 25 mg  25 mg Oral Q6H  
 digoxin (LANOXIN) injection 125 mcg  125 mcg IntraVENous ONCE PRN  mirtazapine (REMERON) tablet 15 mg  15 mg Oral QHS  ipratropium (ATROVENT) 0.02 % nebulizer solution 0.5 mg  0.5 mg Nebulization QID RT  
 0.9% sodium chloride infusion 250 mL  250 mL IntraVENous PRN  
 arformoteroL (BROVANA) neb solution 15 mcg  15 mcg Nebulization BID RT  
 budesonide (PULMICORT) 500 mcg/2 ml nebulizer suspension  500 mcg Nebulization BID RT  
 montelukast (SINGULAIR) tablet 10 mg  10 mg Oral QHS  meropenem (MERREM) 500 mg in sterile water (preservative free) 10 mL IV syringe  0.5 g IntraVENous Q8H  
 nystatin (MYCOSTATIN) 100,000 unit/mL oral suspension 500,000 Units  500,000 Units Oral QID AFTER MEALS  
 amiodarone (CORDARONE) tablet 200 mg  200 mg Oral DAILY  insulin lispro (HUMALOG) injection   SubCUTAneous AC&HS  
 glucose chewable tablet 16 g  4 Tab Oral PRN  
 dextrose (D50W) injection syrg 12.5-25 g  25-50 mL IntraVENous PRN  
 glucagon (GLUCAGEN) injection 1 mg  1 mg IntraMUSCular PRN  pantoprazole (PROTONIX) 40 mg in 0.9% sodium chloride 10 mL injection  40 mg IntraVENous Q12H  
 sodium chloride (NS) flush 5-40 mL  5-40 mL IntraVENous Q8H  
 sodium chloride (NS) flush 5-40 mL  5-40 mL IntraVENous PRN  
 acetaminophen (TYLENOL) tablet 650 mg  650 mg Oral Q6H PRN Or  
 acetaminophen (TYLENOL) suppository 650 mg  650 mg Rectal Q6H PRN  polyethylene glycol (MIRALAX) packet 17 g  17 g Oral DAILY PRN  
 bisacodyL (DULCOLAX) suppository 10 mg  10 mg Rectal DAILY PRN  
 ondansetron (ZOFRAN) injection 4 mg  4 mg IntraVENous Q6H PRN  
 0.9% sodium chloride infusion 250 mL  250 mL IntraVENous PRN  
 apixaban (ELIQUIS) tablet 5 mg  5 mg Oral BID OBJECTIVE Intake/Output Summary (Last 24 hours) at 3/3/2021 6521 Last data filed at 3/3/2021 0925 Gross per 24 hour Intake 360 ml Output 2100 ml Net -1740 ml Review of Systems - History obtained from the patient AS PER  HPI Telemetry AF v rate 120s at 1300--> now NSR 
 
PHYSICAL EXAM  
  
 
Visit Vitals BP (!) 125/38 (BP 1 Location: Right upper arm) Pulse 90 Temp 98.4 °F (36.9 °C) Resp 29 Ht 5' 7\" (1.702 m) Wt 241 lb 12.8 oz (109.7 kg) SpO2 95% BMI 37.87 kg/m² Gen: Well-developed, obese, ill appearing,   alert and oriented x 3, resting HEENT:  Pink conjunctivae, Circle. No scleral icterus or conjunctival, moist mucous membranes Neck: Supple, CVL Resp: no accessory muscle use, distant BS- difficult exam given body habitus Card: RRR Rate,Rythm, 2/6 murmurs, no rubs or gallop. No thrills. GI:          soft, non-tender MSK: No cyanosis or clubbing Skin: No rashes or ulcers, ecchymosis diffuse to BUE and BLE Neuro:  Cranial nerves are grossly intact, moving all four extremities, no focal deficit, follows commands appropriately Psych:  fair insight, oriented to person, place and time, alert, flat Affect LE: +3 pitting BLE edema. Erythema/purplish discoloration  to BLE  
  
 
DATA REVIEW Laboratory and Imaging have been reviewed by me and are notable for No results for input(s): CPK, CKMB, TROIQ in the last 72 hours. Recent Labs 03/03/21 
0110 03/02/21 
0225 03/01/21 
1712 03/01/21 
0500  138  --  139  
K 3.5 3.5  --  3.5 CO2 28 25  --  27 BUN 11 14  --  17  
CREA 0.96 1.12*  --  1.26* GLU 93 79  --  82 WBC 8.9 11.5* 11.7* 12.6* HGB 8.7* 8.6* 8.4* 8.4* HCT 28.9* 29.0* 28.1* 27.9*  
 [phone] Raulito Adam, MELINDA

## 2021-03-03 NOTE — HOSPICE
Hospice Update: [facility] has contract with [facility] for hospice care. Please call if family would like to pursue hospice services at Located within [facility] with [facility]. Addendum 16:00: Bedside [facility] as well as contracted with facility. Bedside visit with patient; she was asleep, labored breathing and red-faced. Unit nurse admitted patient has sleep apnea, and this state was not new for her. Plan: to continue hospice discussion in the am, after CM confirms bed availability for discharge. Will be available to assist with discharge planning. Please call Hospice to be of assistance. Thank you, Breann Willams RN, [facility] Hospice Nurse Liaison [phone] Mobile [phone] Office

## 2021-03-03 NOTE — PROGRESS NOTES
1900-Bedside and Verbal shift change report given to Cally Mullen RN (oncoming nurse) by Isaias Landrum Rn (offgoing nurse). Report included the following information SBAR, Kardex, Intake/Output, MAR, Accordion, Recent Results, Med Rec Status and Cardiac Rhythm A fib/NSR. Pt has been refusing to turn with turn team. Educated patient on the importance of turning to protect her skin and help prevent pressure injuries. Pt continued to refuse  To be turned. Central line Type: Triple Lumen Central Line Insert Date: 36 Reason Central Line Placed: Medication Central Line Dressing Date:3/1 Biopatch in place? Yes No: yes Tubing labeled and appropriate? Yes No: yes Alcohol caps on all open ports? Yes No: yes Last CHG bath (time&date): 3/2 2200 Reviewed with provider and central line must stay in for the following reasons: medications. This patient was assisted with Intentional Toileting every 2 hours during this shift. Documentation of ambulation and output reflected on Flowsheet. 0700-Bedside and Verbal shift change report given to Swetha Chandler RN (oncoming nurse) by Dasha Hathaway  (offgoing nurse). Report included the following information SBAR, Kardex, Intake/Output, MAR, Accordion, Recent Results and Med Rec Status.

## 2021-03-03 NOTE — HOSPICE
Hospice Liaison Note: 
 
Chart reviewed for updates in plan of care. Please call Hospice team to offer support for patient, family or staff. Thank you for your coordination with the hospice plan of care Jessy Richter RN, [facility] Hospice Nurse Liaison [phone] [place] [phone] Office

## 2021-03-03 NOTE — CONSULTS
Palliative medicine brief note- Met with patient, she stated \"Im not going to get better\" and indicated she wants to focus on comfort. I called the patients daughter Michi Castillo, she agreed to meet at bedside today at 1130. During meeting, we will  discuss code status, Hospice and where  patient will be discharged, [facility], LTC or home with family. Please contact me via [facility] with any questions or concerns. Thank you.

## 2021-03-04 NOTE — PROGRESS NOTES
Daily Progress Note: 3/4/2021 Yina Narayan MD 
 
Assessment/Plan:  
Acute blood loss anemia due to GIB / Symptomatic anemia:   
-  transfuse as needed. GI consulted. -  Capsule study resulted - AVMs not currently bleeding 
  
Acute on chronic systolic CHF /  Elevated BNP/ A fib with RVR:   
-  Cardiology consulted.  
  
Dyspnea: waxes and wanes 
  
COPD: Appears to be stable and at baseline with no exacerbation. Continue with baseline oxygen supplementation and increase as needed. Scheduled and as needed bronchodilators. 
  
Atrial fibrillation ([address] (11/16/2018) on Eliquis:  
- per Cardiology 
  
DM type 2:  Monitor blood glucose levels with insulin sliding scale coverage. No basal coverage for now. Monitor for complications. UTI 
- urine culture with E Coli ESBL sensitive to Merrem 
  
Body mass index is 36.96 kg/m².: 30.0 - 39.9 Obese   
   
 
Problem List: 
Problem List as of 3/4/2021 Date Reviewed: 11/2/2020 Codes Class Noted - Resolved Elevated brain natriuretic peptide (BNP) level ICD-10-CM: R79.89 ICD-9-CM: 790.99  2/23/2021 - Present Acute blood loss anemia ICD-10-CM: D62 
ICD-9-CM: 285.1  2/23/2021 - Present Dyspnea ICD-10-CM: R06.00 
ICD-9-CM: 786.09  2/23/2021 - Present GIB (gastrointestinal bleeding) ICD-10-CM: K92.2 ICD-9-CM: 578.9  2/23/2021 - Present Symptomatic anemia ICD-10-CM: D64.9 ICD-9-CM: 285.9  2/23/2021 - Present Pneumonia ICD-10-CM: J18.9 ICD-9-CM: 824  1/26/2021 - Present SOB (shortness of breath) ICD-10-CM: R06.02 
ICD-9-CM: 786.05  1/26/2021 - Present Anemia ICD-10-CM: D64.9 ICD-9-CM: 285.9  1/26/2021 - Present Acute on chronic systolic CHF (congestive heart failure) (HCC) ICD-10-CM: Z32.70 ICD-9-CM: 428.23, 428.0  1/26/2021 - Present Uncontrolled type 2 diabetes mellitus with hyperglycemia (HCC) ICD-10-CM: E11.65 ICD-9-CM: 250.02  1/26/2021 - Present Left lower lobe pneumonia ICD-10-CM: J18.9 ICD-9-CM: 033  1/1/2021 - Present Leukocytosis ICD-10-CM: R15.899 ICD-9-CM: 288.60  11/3/2020 - Present Chronic respiratory failure with hypoxia [facility]) ICD-10-CM: J96.11 
ICD-9-CM: 518.83, 799.02  11/2/2020 - Present Overview Signed 11/2/2020 10:12 PM by Estrellita Bailey MD  
  On 3L NC at home Cellulitis of lower extremity ICD-10-CM: L03.119 ICD-9-CM: 682.6  3/23/2020 - Present ASO (arteriosclerosis obliterans) ICD-10-CM: I70.90 ICD-9-CM: 440.9  9/5/2019 - Present PVD (peripheral vascular disease) ([facility] ICD-10-CM: I73.9 ICD-9-CM: 443.9  8/1/2019 - Present Severe obesity ([facility] ICD-10-CM: E66.01 
ICD-9-CM: 278.01  7/30/2019 - Present COPD (chronic obstructive pulmonary disease) ([facility]) ICD-10-CM: J44.9 ICD-9-CM: 711  7/13/2019 - Present Normocytic anemia ICD-10-CM: D64.9 ICD-9-CM: 285.9  7/13/2019 - Present PAD (peripheral artery disease) ([facility]) ICD-10-CM: I73.9 ICD-9-CM: 443.9  7/13/2019 - Present Mild intermittent asthma ICD-10-CM: J45.20 ICD-9-CM: 493.90  5/17/2019 - Present Brachial artery thrombus (HCC) ICD-10-CM: Y70.8 ICD-9-CM: 444.21  4/26/2019 - Present Sleep apnea ICD-10-CM: G47.30 ICD-9-CM: 780.57  1/5/2019 - Present Overview Signed 1/5/2019  8:41 PM by Skip Vinay, DO  
  wears CPAP Atrial fibrillation [facility]) ICD-10-CM: I48.91 
ICD-9-CM: 427.31  11/16/2018 - Present Obesity (BMI 30-39.9) (Chronic) ICD-10-CM: G22.8 ICD-9-CM: 278.00  11/13/2018 - Present Recurrent deep vein thrombosis (DVT) (HCC) ICD-10-CM: I82.409 ICD-9-CM: 453.40  3/30/2018 - Present Chronic anticoagulation (Chronic) ICD-10-CM: Z79.01 
ICD-9-CM: V58.61  3/30/2018 - Present Essential hypertension (Chronic) ICD-10-CM: I10 
ICD-9-CM: 401.9  1/22/2016 - Present CAD (coronary artery disease) ICD-10-CM: I25.10 ICD-9-CM: 414.00  10/9/2015 - Present Type II diabetes mellitus (HCC) (Chronic) ICD-10-CM: E11.9 ICD-9-CM: 250.00  10/9/2015 - Present RESOLVED: Syncope and collapse ICD-10-CM: R55 
ICD-9-CM: 780.2  9/29/2020 - 11/2/2020 RESOLVED: Cellulitis ICD-10-CM: L03.90 ICD-9-CM: 682.9  3/23/2020 - 5/29/2020 RESOLVED: Hypokalemia ICD-10-CM: E87.6 ICD-9-CM: 276.8  3/23/2020 - 5/29/2020 RESOLVED: Hyponatremia ICD-10-CM: E87.1 ICD-9-CM: 276.1  3/23/2020 - 5/29/2020 RESOLVED: Diarrhea ICD-10-CM: R19.7 ICD-9-CM: 787.91  3/23/2020 - 5/29/2020 RESOLVED: Syncope and collapse ICD-10-CM: R55 
ICD-9-CM: 780.2  7/13/2019 - 5/29/2020 RESOLVED: UTI (urinary tract infection) ICD-10-CM: N39.0 ICD-9-CM: 599.0  7/13/2019 - 11/2/2020 RESOLVED: Sepsis ([facility] ICD-10-CM: A41.9 ICD-9-CM: 038.9, 995.91  7/13/2019 - 11/3/2020 RESOLVED: Leg wound, left ICD-10-CM: F14.930X ICD-9-CM: 891.0  7/13/2019 - 5/29/2020 RESOLVED: Leg laceration, right, initial encounter ICD-10-CM: M23.985G ICD-9-CM: 894.0  7/13/2019 - 5/29/2020 RESOLVED: Cellulitis of right upper arm ICD-10-CM: L03.113 ICD-9-CM: 682.3  5/17/2019 - 5/29/2020 RESOLVED: Gangrene of finger of right hand ([facility] ICD-10-CM: G58 
ICD-9-CM: 785.4  5/17/2019 - 11/2/2020 RESOLVED: Bowel obstruction ([facility] ICD-10-CM: M27.289 ICD-9-CM: 560.9  1/8/2019 - 5/29/2020 RESOLVED: Partial small bowel obstruction ([facility] ICD-10-CM: K56.600 ICD-9-CM: 560.9  1/5/2019 - 5/29/2020 RESOLVED: Dyspnea ICD-10-CM: R06.00 
ICD-9-CM: 786.09  11/13/2018 - 5/29/2020 RESOLVED: ARF (acute renal failure) (HCC) ICD-10-CM: N17.9 ICD-9-CM: 584.9  11/13/2018 - 5/29/2020 RESOLVED: Closed wedge compression fracture of T7 vertebra ([facility] ICD-10-CM: A43.826W ICD-9-CM: 805.2  11/13/2018 - 5/29/2020  RESOLVED: Type 2 diabetes with nephropathy (HCC) ICD-10-CM: E11.21 
 ICD-9-CM: 250.40, 583.81  10/1/2018 - 11/2/2020 RESOLVED: Chest pain ICD-10-CM: R07.9 ICD-9-CM: 786.50  6/27/2018 - 5/29/2020 RESOLVED: Abdominal pain ICD-10-CM: R10.9 ICD-9-CM: 789.00  6/27/2018 - 5/29/2020 RESOLVED: Coronary artery disease involving native coronary artery without angina pectoris (Chronic) ICD-10-CM: I25.10 ICD-9-CM: 414.01  1/22/2016 - 11/2/2020 RESOLVED: HTN (hypertension) ICD-10-CM: I10 
ICD-9-CM: 401.9  10/9/2015 - 1/22/2016 RESOLVED: NSTEMI (non-ST elevated myocardial infarction) ([facility] ICD-10-CM: I21.4 ICD-9-CM: 410.70  10/9/2015 - 5/29/2020 Subjective:  
 70 y.o. female with history that includes DM 2, CAD with MI, COPD on 3LNC baseline, and A. fib on Eliquis who was recently admitted to [facility] with anemia, pneumonia, and acute on chronic CHF then transferred to rehab was sent back to the rehab due to anemia and dyspnea. She has been somewhat dyspneic for the past couple of days constant and worsening to the point of being severe even at rest with increased O2 requirements up to 4 LNC associated with orthopnea. She was found to have a hemoglobin of 6.8 at the rehab center. However, here in the ER she was found to have hemoglobin at 7.2 and she was back to her 3LNC. It was reported dark stools which tested FOBT positive. She is on Eliquis as mentioned. (Dr Vandana Fields) 2/23:  Reports she if feeling better this AM with less SOB and O2 back to usual 3 liters. No ab pain. No CP. Sats ok on O2. Nurses report they are unable to get AM labs and pt needs central, PICC or mid line placed. She does not appear in any distress. Dr. Vandana Fields has ordered blood and consented pt. Her chronic GI bleeding may warrant complete DC of anticoagulation - discussed with GI and little else to do. Also consulting Cards for her chronic decompensation of CHF/A fib episodes with mult readmissions - there may be little to do for this either. 2/24:  C/O mild nausea this AM and somewhat fatigued but otherwise little change. Rate has been up and down requiring does of IV dig and Amiodarone drip. Cards also consulting EP for poss ablation. Cards and GI agree with restart of Eliquis - risk of CVA > risk of bleeds. 2/25:  Still c/o nausea and now having loose stools - 3 overnight. Declines capsule study for GI bleeding. Also, at this time, reports she does not want to have pacer or any other studies/treatments and wants to consider Hospice. Discussed risks v benefits of continued work up and treatment of problems but she reports \"getting tired of having things done to me. \"  She wants to have an information session with Hospice for further consideration. 2/26:  No nausea at this moment. Needed Zofran once last night and had one loose stool. CT of ab/pelvis reveals nothing major. Capsule study of small bowel today. She does not need to be off tele for the study as she has hard wired tele and not remote tele. She reports she will do the capsule study and \"if nothing to fix then maybe do that Hospice. \"  Back in sinus rhythm at this moment. 2/27:  More shortness of breath and back in A fib again - rate 100s - 110s. No nausea at this moment. Tmax 101.1. Tnow 99. Repeat cultures pending. Check port CXR. Lungs sound more congested this AM.  Hb 7.4 this AM.  Will get Card to see again now that she is back in A fib with symptoms. 2/28:  Feels fatigued and sleepy. No CP or shortness of breath. BP lower last night and Roland had to be started. Urine culture 2/26/21 with 100K gram neg rods and Merrem started. May need to go to ICU. She is going to be diff to manage her fluids/BP due to A fib, CHF and other med problems. Blood culture neg so far. Tmax 99.1. Hb 7.3. May need to transfuse again with her low BP. Cardiology is seeing and Pul reconsulted. 3/1:  Feeling a little better today in general.  Less sleepy.  She is not moving about much - encouraged her to work with PT/OT.   BP trending better but still low - still on pressor.  Dilt and Metoprolol being held.  Urine culture with E Coli ESBL again but sensitive to Merrem.  Blood cult neg so far. Transfused 1 unit PRCs yesterday.  Tearful and wants to consider hospice but also admits to feeling depressed - will add Remeron as she is on QTc prolonging meds.  Consult had been placed to Hospice several days ago for information session.   
 
3/2:  Continues to report feeling better.  BP better this AM - yesterday evening had worsening MAP and gave small fluid bolus and upped Roland to 110 - can now titrate again.  Episode of rapid A fib last night and Cards gave Amio bolus and started Amio drip.  Discussed results of capsule study with pt.   
 
3/3:  Feels worse today with return of nausea.  No other complaints except \"just weak all over.\"   Back in sinus rhythm at this time.  Off amio drip.  Off pressor but BP still soft - may have to resume Roland.  Alessio discussion was held with pt about her medical problems and that most likely she is not going to improve to any significant degree - not very accepting of prognosis at this time.  She did meet with hospice yesterday and still wishes to remain full code.    Blood culture remains negative.  Remains on Merrem for ESBL E Coli.   
 
3/4:  More somnolent but awakens and will converse some.  She reports the nausea has resolved and has no ab pain.  Remains in sinus rhythm.  BP better at times but still low at other times.  Off pressor currently.  She has discussed poss hospice with Palliative Care - decision still pending per patient.  She may be going to hospice today.  There may be little else to do at this point.   
  
Review of Systems:  
A comprehensive review of systems was negative except for that written in the HPI. 
 
Objective:  
Physical Exam:  
Visit Vitals 
 BP (!) 145/51 Pulse 83 Temp 98.5 °F (36.9 °C) Resp 22 Ht 5' 7\" (1.702 m) Wt 109.7 kg (241 lb 12.8 oz) SpO2 95% BMI 37.87 kg/m² O2 Flow Rate (L/min): 2 l/min O2 Device: Nasal cannula Temp (24hrs), Av.9 °F (37.2 °C), Min:98.4 °F (36.9 °C), Max:99.5 °F (37.5 °C) No intake/output data recorded.  190 -  0700 In: 61 [P.O.:60] Out: 1100 [Urine:1100] General:  Alert, obese,cooperative, no distress, appears stated age. Head:  Normocephalic, without obvious abnormality, atraumatic. Eyes:  Conjunctivae/corneas clear. EOMs intact. Neck: Supple, symmetrical, trachea midline, no adenopathy, thyroid: no enlargement/tenderness/nodules, no carotid bruit and no JVD. Lungs:   Fairly clear except a few basilar rales bilaterally. Chest wall:  No tenderness or deformity. Heart:  Reg today with rate in 90s; 1/6 GRISELDA right 2nd ICS. Abdomen:   Soft, mild generalized tenderness without rebound or guarding. Bowel sounds normal. No masses,  No organomegaly. Extremities: no cyanosis. Trace LE edema on top of usual stasis changes. No calf tenderness or cords. Pulses: 2+ and symmetric all extremities. Skin:  turgor normal. No rashes other than stasis changes in LEs Neurologic:   Alert and oriented X 3. Somnolent but arouses and discusses for a few minutes before falling back asleep. Fine motor of hands and fingers normal.   equal.  No cogwheeling or rigidity. Gait not tested at this time. Sensation grossly normal to touch. Gross motor of extremities normal.    
 
Data Review: EXAM:  CT ABD PELV W WO CONT 21 INDICATION: unexplained anemia and abd pain COMPARISON: CT abdomen pelvis 3/23/2020. CONTRAST:  100 mL of Isovue-370. 
 FINDINGS:  
Lower Thorax: 
Lung Bases: Moderate left and small right pleural effusions with overlying 
subsegmental atelectasis in the lower lobes. Bilateral interlobular septal 
thickening with scattered groundglass opacities. Heart: Unchanged cardiomegaly. No pericardial effusion. Coronary arteries stents 
noted. A vascular catheter terminates at the cavoatrial junction. Abdomen/Pelvis: 
Liver:  No focal liver lesions. Biliary system: Gallbladder is surgically absent. No intrahepatic or 
extrahepatic biliary ductal dilatation. Spleen: Normal. 
Pancreas: Normal. 
Kidneys/Ureters/Bladder: Multifocal cortical scarring in the kidneys. No renal 
masses. There is a 3 mm nonobstructing stone in the upper pole the right kidney, 
and a 2 mm nonobstructing stone in the lower pole the right kidney. No 
hydronephrosis or hydroureter. The bladder is normal. 
Adrenals: Normal. 
Stomach/bowel: No evidence of active GI bleeding. Incidental 2.1 cm gastric 
lipoma noted within the gastric body. No dilation or abnormal wall thickening is 
present. No free intraperitoneal air noted. Normal appendix. Fat-containing 
umbilical hernia, with postsurgical changes noted in the ventral abdominal wall. Reproductive Organs: Status post hysterectomy. No suspicious adnexal masses. Vasculature: Normal caliber arteries. Moderate calcific atherosclerosis of the 
abdominal aorta and iliac vasculature. There are recent postoperative changes in 
the left groin overlying the left femoral vessels, with prominent 
hyperdense/calcific material noted in this region and surgical clips. There is a 
small superficial fluid collection in the left groin measuring 2.1 x 1.4 cm 
(series 6 image 207). Portal vein, SMV, and splenic vein are patent. Nodes: No pathologically enlarged lymph nodes. Fluid: No free fluid. Bones/Soft Tissue: No acute fractures or aggressive osseous lesions are seen. Chronic L3 compression fracture status post kyphoplasty. Chronic T7 compression 
fracture status post kyphoplasty. There is nonspecific subcutaneous edema noted 
within the left lateral abdominal wall. IMPRESSION 1. No evidence of active GI bleeding.  No evidence of acute process in the 
 abdomen or pelvis. 2. Moderate left and small right pleural effusions with overlying lower lobe 
subsegmental atelectasis. Pulmonary interstitial and alveolar edema. 3. Left inguinal postoperative changes involving the left femoral vessels, with 
a small superficial fluid collection overlying the left femoral vessels 
measuring 2.1 x 1.4 cm. Correlate with history. Consider ultrasound to evaluate 
for pseudoaneurysm and/or hematoma. 4. Nonspecific subcutaneous edema in the left lateral abdominal wall. 
  
Recent Days: 
Recent Labs 03/03/21 0110 03/02/21 0225 03/01/21 
1712 WBC 8.9 11.5* 11.7* HGB 8.7* 8.6* 8.4* HCT 28.9* 29.0* 28.1*  
 329 308 Recent Labs 03/03/21 0110 03/02/21 0225  138  
K 3.5 3.5  104 CO2 28 25 GLU 93 79 BUN 11 14 CREA 0.96 1.12* CA 7.6* 7.5* ALB 1.7* 1.8* TBILI 0.5 0.6 ALT 10* 11* No results for input(s): PH, PCO2, PO2, HCO3, FIO2 in the last 72 hours. 24 Hour Results: 
Recent Results (from the past 24 hour(s)) GLUCOSE, POC Collection Time: 03/03/21  8:24 AM  
Result Value Ref Range Glucose (POC) 94 65 - 100 mg/dL Performed by [employer] GLUCOSE, POC Collection Time: 03/03/21 12:02 PM  
Result Value Ref Range Glucose (POC) 102 (H) 65 - 100 mg/dL Performed by [employer] GLUCOSE, POC Collection Time: 03/03/21  4:08 PM  
Result Value Ref Range Glucose (POC) 110 (H) 65 - 100 mg/dL Performed by [employer] GLUCOSE, POC Collection Time: 03/03/21  8:45 PM  
Result Value Ref Range Glucose (POC) 108 (H) 65 - 100 mg/dL Performed by [facility] Medications reviewed Current Facility-Administered Medications Medication Dose Route Frequency  metoprolol tartrate (LOPRESSOR) tablet 25 mg  25 mg Oral Q6H  
 morphine injection 1 mg  1 mg IntraVENous Q4H PRN  mirtazapine (REMERON) tablet 15 mg  15 mg Oral QHS  ipratropium (ATROVENT) 0.02 % nebulizer solution 0.5 mg  0.5 mg Nebulization QID RT  
 0.9% sodium chloride infusion 250 mL  250 mL IntraVENous PRN  
 arformoteroL (BROVANA) neb solution 15 mcg  15 mcg Nebulization BID RT  
 budesonide (PULMICORT) 500 mcg/2 ml nebulizer suspension  500 mcg Nebulization BID RT  
 montelukast (SINGULAIR) tablet 10 mg  10 mg Oral QHS  nystatin (MYCOSTATIN) 100,000 unit/mL oral suspension 500,000 Units  500,000 Units Oral QID AFTER MEALS  
 amiodarone (CORDARONE) tablet 200 mg  200 mg Oral DAILY  insulin lispro (HUMALOG) injection   SubCUTAneous AC&HS  
 glucose chewable tablet 16 g  4 Tab Oral PRN  
 dextrose (D50W) injection syrg 12.5-25 g  25-50 mL IntraVENous PRN  
 glucagon (GLUCAGEN) injection 1 mg  1 mg IntraMUSCular PRN  pantoprazole (PROTONIX) 40 mg in 0.9% sodium chloride 10 mL injection  40 mg IntraVENous Q12H  
 sodium chloride (NS) flush 5-40 mL  5-40 mL IntraVENous Q8H  
 sodium chloride (NS) flush 5-40 mL  5-40 mL IntraVENous PRN  
 acetaminophen (TYLENOL) tablet 650 mg  650 mg Oral Q6H PRN Or  
 acetaminophen (TYLENOL) suppository 650 mg  650 mg Rectal Q6H PRN  polyethylene glycol (MIRALAX) packet 17 g  17 g Oral DAILY PRN  
 bisacodyL (DULCOLAX) suppository 10 mg  10 mg Rectal DAILY PRN  
 ondansetron (ZOFRAN) injection 4 mg  4 mg IntraVENous Q6H PRN  
 0.9% sodium chloride infusion 250 mL  250 mL IntraVENous PRN  
 apixaban (ELIQUIS) tablet 5 mg  5 mg Oral BID Care Plan discussed with: Patient and Nurse Total time spent with patient: 30 minutes.  
Kerline Wiggins MD

## 2021-03-04 NOTE — PROGRESS NOTES
[facility] Dosing Services: IV to PO Conversion The pharmacist has determined that this patient meets P & T approved criteria for conversion from IV to oral therapy for the following medication:Pantoprazole 40 mg IV every 12 hours The pharmacist has written the following order for the patient: Pantoprazole 40 mg PO twice daily before meals The pharmacist will continue to monitor the patient's status and advise the physician if conversion back to IV therapy is recommended. Signed Monie Vizcaino Contact information:  [phone]

## 2021-03-04 NOTE — PROGRESS NOTES
Chart and telemetry reviewed Patient moving to comfort care In NSR Metoprolol changed to BID Please call with questions or if cardiac input is further needed

## 2021-03-04 NOTE — PROGRESS NOTES
Problem: Risk for Spread of Infection Goal: Prevent transmission of infectious organism to others Description: Prevent the transmission of infectious organisms to other patients, staff members, and visitors. Outcome: Progressing Towards Goal 
  
Problem: Falls - Risk of 
Goal: *Absence of Falls Description: Document Mirtha Dann Fall Risk and appropriate interventions in the flowsheet. Outcome: Progressing Towards Goal 
Note: Fall Risk Interventions: 
Mobility Interventions: Communicate number of staff needed for ambulation/transfer, Patient to call before getting OOB Mentation Interventions: Adequate sleep, hydration, pain control, Evaluate medications/consider consulting pharmacy Medication Interventions: Patient to call before getting OOB Elimination Interventions: Call light in reach, Patient to call for help with toileting needs, Toileting schedule/hourly rounds

## 2021-03-04 NOTE — PROGRESS NOTES
Per patient, she wants to continue having her blood sugar checked despite transitioning to hospice tomorrow. Will report to night shift and add POC Glucose Accuchecks order.

## 2021-03-04 NOTE — HOSPICE
Hospice Liaison Note: 
 
Chart reviewed for updates in plan of care. Please call Hospice team to offer support for patient, family or staff. Thank you for your coordination with the hospice plan of care Addendum:12:00: Bedside visit with hospice team. Patient appears to be sleeping, does not awaken when her name was called. Plan to re-visit to review hospice consents and discharge planning. Verbal CTI for the hospice diagnosis of Advanced COPD as primary; HAYES and Heart failure as secondary from Dr. Sanjay Pratt. [facility] Admission scheduled for 15:00 on 3/5/2021 [facility] contact is Sara Ahumada, nursing supervisor. Papa Camp RN, [facility] Hospice Nurse Liaison [phone] [place] [phone] Office

## 2021-03-04 NOTE — PROGRESS NOTES
3/4/2021 4:35 PM Hand off received from [facility], Johnathan Monteiro Planing for pt to discharge to [facility] on 3/5. CM requested [facility] transport for 1PM on 3/5 via [employer]. CM called to [facility] they can accept pt at 1PM on 3/5 but request one more rapid COVID test prior to her return. CM relayed request for rapid COVID test on 3/5 in AM prior to discharge. Attempted to meet with pt to discuss discharge, pt resting in bed. CM will follow. Transitions of Care Plan: 1. Discharge to [facility] on 3/5 at 1PM via [facility] 2. Will need a rapid COVID done in AM on 3/5 prior to discharge 3. Admit to [employer] [facility] [facility] at [facility] on 3/5 at [address] Pt has a RUR score of 51%

## 2021-03-04 NOTE — PROGRESS NOTES
Patient care goals now comfort and plan is for discharge to [address] [place] [address] with hospice services. BP remains stable off pressors and she is at her baseline O2 requirement. We will sign off and be available as needed. Please call with questions.

## 2021-03-04 NOTE — PROGRESS NOTES
3/4/2021  
 
3:20 PM 
CM uploaded PCR to [employer] and patient is on will call for[facility]  discharge to [facility] with [facility] tomorrow, 3/5/21. 
 
 
12:29 PM 
 
CM spoke with [facility], Tiburcio Ramírez, who advised that patient's admission to Hospice services is tomorrow @ 3 pm. 
CM called [facility] and spoke with Jaylin Osuna who stated their preference is for patient to come at time of admission to Hospice service. Bed will be available tomorrow. CM to setup [facility] for patient to transfer to [place].  
 
Roby Justin RN

## 2021-03-04 NOTE — PROGRESS NOTES
0700 
Bedside and Verbal shift change report given to Juli Agustin (oncoming nurse) by Shannan Tamayo (offgoing nurse). Report included the following information SBAR, Kardex, Procedure Summary, Intake/Output, MAR, Accordion, Recent Results and Cardiac Rhythm Afib. Initial assessment done. Drowsy but oriented x4. Denies any pain. Will continue to monitor. Visit Vitals /65 (BP 1 Location: Right upper arm, BP Patient Position: At rest) Pulse 76 Temp 98.3 °F (36.8 °C) Resp 16 Ht 5' 7\" (1.702 m) Wt 109.7 kg (241 lb 12.8 oz) SpO2 99% BMI 37.87 kg/m² [address] TRANSFER - OUT REPORT: 
 
Verbal report given to Suzette Barrera (name) on Colletta Hummingbird  being transferred to [facility] (unit) for routine progression of care Report consisted of patients Situation, Background, Assessment and  
Recommendations(SBAR). Information from the following report(s) SBAR, Kardex, Procedure Summary, Intake/Output, MAR, Accordion and Recent Results was reviewed with the receiving nurse. Lines:  
Triple Lumen 02/23/21 Anterior;Right Neck (Active) Central Line Being Utilized Yes 03/04/21 3858 Criteria for Appropriate Use Hemodynamically unstable, requiring monitoring lines, vasopressors, or volume resuscitation 03/04/21 0810 Site Assessment Clean, dry, & intact 03/04/21 0810 Infiltration Assessment 0 03/04/21 0810 Affected Extremity/Extremities Color distal to insertion site pink (or appropriate for race) 03/04/21 7316 Date of Last Dressing Change 03/01/21 03/04/21 3183 Dressing Status Clean, dry, & intact 03/04/21 0810 Dressing Type Tape;Transparent 03/04/21 0810 Action Taken Open ports on tubing capped 03/04/21 0810 Proximal Hub Color/Line Status Cap end changed; Flushed 03/04/21 0810 Positive Blood Return (Medial Site) Yes 03/04/21 0810 Medial Hub Color/Line Status Flushed;Cap end changed 03/04/21 0810 Positive Blood Return (Lateral Site) No 03/04/21 0810 Distal Hub Color/Line Status Cap end changed; Flushed 03/04/21 0810 Positive Blood Return (Site #3) Yes 03/04/21 0810 Alcohol Cap Used Yes 03/04/21 0810 Opportunity for questions and clarification was provided. Patient transported with: 
 Monitor Registered Nurse

## 2021-03-04 NOTE — PROGRESS NOTES
[id number]- Bedside, Verbal, and Written shift change report given to Manuel Barrera RN (oncoming nurse) by RN (offgoing nurse). Report included the following information SBAR, Kardex, ED Summary, Intake/Output, MAR, Accordion, Recent Results, and Med Rec Status, A fib/NSR. Central line Type: triple lumen right IJ Central Line Insert Date: 03/01/21 Reason Central Line Placed: Limited access Central Line Dressing Date: 03/01/[address]? Yes No: yes Tubing labeled and appropriate? Yes No: yes Alcohol caps on all open ports? Yes No: yes Last CHG bath (time&date): 03/03 1400 Reviewed with provider and central line must stay in for the following reasons: limited access This patient was assisted with Intentional Toileting every 2 hours during this shift. Documentation of ambulation and output reflected on Flowsheet. 0700- Bedside, Verbal, and Written shift change report given to RN  (oncoming nurse) by Hilton Little  (offgoing nurse). Report included the following information SBAR, Kardex, ED Summary, Intake/Output, MAR, Accordion, Recent Results, and Med Rec Status.

## 2021-03-04 NOTE — PROGRESS NOTES
Nutrition Note 3/4: Pt assessed for follow up. Noted that pt is now comfort care with plans to d/c with hospice. No further nutrition interventions appropriate at this time. Will monitor for any changes in goals of care. Please consult if needed. Electronically signed by Ayana Pollard RDN on 3/4/2021 at 9:07 AM 
 
Contact: [phone]

## 2021-03-05 NOTE — PROGRESS NOTES
Called report to [facility]. Patient transported at 1:00 PM with all belongings and packet for [id number] [place] [address]

## 2021-03-05 NOTE — HOSPICE
[facility] MSW sent hospice consents via [employer] to pt's daughter Yobany Villela. Consents completed by Yobany Villela 3/4 afternoon. CINDI Esteban [address] [facility] Worker [phone]

## 2021-03-05 NOTE — HOSPICE
Hospice Liaison Note: 
 
Chart reviewed for updates in plan of care. Update received from CM, Juana Cintron, for liaison to review patient for stability to transport to [facility] today. Noted patient's BP: 80/40 Visit Vitals BP (!) 99/38 Pulse 83 Temp 99 °F (37.2 °C) Resp 18 Ht 5' 7\" (1.702 m) Wt 110 kg (242 lb 8.1 oz) SpO2 97% BMI 37.98 kg/m² Plan: evaluate patient for hospice plan within the next few hours. Please call Hospice team to offer support for patient, family or staff. Thank you for your coordination with the hospice plan of care Liam Ohara RN, [facility] Nurse Liaison [phone] [place] [phone] Office

## 2021-03-05 NOTE — PROGRESS NOTES
Daily Progress Note and Discharge Note: 3/5/2021 Nick Simms MD 
 
Assessment/Plan:  
Acute blood loss anemia due to GIB / Symptomatic anemia:   
-  GI consulted. -  Capsule study resulted - AVMs not currently bleeding -  Further transfusions per Hospice decision Acute on chronic systolic CHF /  Elevated BNP/ A fib with RVR:   
-  Cardiology consulted. -  meds per Hospice and Cards 
  
Dyspnea: waxes and wanes -  Cont O2 as needed for comfort 
  
COPD: Appears to be stable and at baseline with no exacerbation. Continue with baseline oxygen supplementation and increase as needed. Scheduled and as needed bronchodilators. 
  
Atrial fibrillation ([facility] [address] (11/16/2018) on Eliquis:  
- per Cardiology and Hospice 
  
DM type 2:  Monitor blood glucose levels with insulin sliding scale coverage. No basal coverage for now. Monitor for complications. UTI 
- urine culture with E Coli ESBL sensitive to Merrem IV and Levaquin po 
  
Body mass index is 36.96 kg/m².: 30.0 - 39.9 Obese   
   
 
Problem List: 
Problem List as of 3/5/2021 Date Reviewed: 11/2/2020 Codes Class Noted - Resolved Elevated brain natriuretic peptide (BNP) level ICD-10-CM: R79.89 ICD-9-CM: 790.99  2/23/2021 - Present Acute blood loss anemia ICD-10-CM: D62 
ICD-9-CM: 285.1  2/23/2021 - Present Dyspnea ICD-10-CM: R06.00 
ICD-9-CM: 786.09  2/23/2021 - Present GIB (gastrointestinal bleeding) ICD-10-CM: K92.2 ICD-9-CM: 578.9  2/23/2021 - Present Symptomatic anemia ICD-10-CM: D64.9 ICD-9-CM: 285.9  2/23/2021 - Present Pneumonia ICD-10-CM: J18.9 ICD-9-CM: 098  1/26/2021 - Present SOB (shortness of breath) ICD-10-CM: R06.02 
ICD-9-CM: 786.05  1/26/2021 - Present Anemia ICD-10-CM: D64.9 ICD-9-CM: 285.9  1/26/2021 - Present Acute on chronic systolic CHF (congestive heart failure) (HCC) ICD-10-CM: P47.64 ICD-9-CM: 428.23, 428.0  1/26/2021 - Present Uncontrolled type 2 diabetes mellitus with hyperglycemia (HCC) ICD-10-CM: E11.65 ICD-9-CM: 250.02  1/26/2021 - Present Left lower lobe pneumonia ICD-10-CM: J18.9 ICD-9-CM: 196  1/1/2021 - Present Leukocytosis ICD-10-CM: F88.880 ICD-9-CM: 288.60  11/3/2020 - Present Chronic respiratory failure with hypoxia [facility]) ICD-10-CM: J96.11 
ICD-9-CM: 518.83, 799.02  11/2/2020 - Present Overview Signed 11/2/2020 10:12 PM by Marie Clark MD  
  On 3L NC at home Cellulitis of lower extremity ICD-10-CM: L03.119 ICD-9-CM: 682.6  3/23/2020 - Present ASO (arteriosclerosis obliterans) ICD-10-CM: I70.90 ICD-9-CM: 440.9  9/5/2019 - Present PVD (peripheral vascular disease) ([facility] [address] ICD-10-CM: I73.9 ICD-9-CM: 443.9  8/1/2019 - Present Severe obesity ([facility] [address] ICD-10-CM: E66.01 
ICD-9-CM: 278.01  7/30/2019 - Present COPD (chronic obstructive pulmonary disease) ([facility]) ICD-10-CM: J44.9 ICD-9-CM: 185  7/13/2019 - Present Normocytic anemia ICD-10-CM: D64.9 ICD-9-CM: 285.9  7/13/2019 - Present PAD (peripheral artery disease) ([facility]) ICD-10-CM: I73.9 ICD-9-CM: 443.9  7/13/2019 - Present Mild intermittent asthma ICD-10-CM: J45.20 ICD-9-CM: 493.90  5/17/2019 - Present Brachial artery thrombus ([facility]) ICD-10-CM: R28.8 ICD-9-CM: 444.21  4/26/2019 - Present Sleep apnea ICD-10-CM: G47.30 ICD-9-CM: 780.57  1/5/2019 - Present Overview Signed 1/5/2019  8:41 PM by Ashley Allen DO  
  wears CPAP Atrial fibrillation [facility]) ICD-10-CM: I48.91 
ICD-9-CM: 427.31  11/16/2018 - Present Obesity (BMI 30-39.9) (Chronic) ICD-10-CM: U94.4 ICD-9-CM: 278.00  11/13/2018 - Present Recurrent deep vein thrombosis (DVT) (HCC) ICD-10-CM: I82.409 ICD-9-CM: 453.40  3/30/2018 - Present Chronic anticoagulation (Chronic) ICD-10-CM: Z79.01 
ICD-9-CM: V58.61  3/30/2018 - Present Essential hypertension (Chronic) ICD-10-CM: I10 
ICD-9-CM: 401.9  1/22/2016 - Present CAD (coronary artery disease) ICD-10-CM: I25.10 ICD-9-CM: 414.00  10/9/2015 - Present Type II diabetes mellitus (HCC) (Chronic) ICD-10-CM: E11.9 ICD-9-CM: 250.00  10/9/2015 - Present RESOLVED: Syncope and collapse ICD-10-CM: R55 
ICD-9-CM: 780.2  9/29/2020 - 11/2/2020 RESOLVED: Cellulitis ICD-10-CM: L03.90 ICD-9-CM: 682.9  3/23/2020 - 5/29/2020 RESOLVED: Hypokalemia ICD-10-CM: E87.6 ICD-9-CM: 276.8  3/23/2020 - 5/29/2020 RESOLVED: Hyponatremia ICD-10-CM: E87.1 ICD-9-CM: 276.1  3/23/2020 - 5/29/2020 RESOLVED: Diarrhea ICD-10-CM: R19.7 ICD-9-CM: 787.91  3/23/2020 - 5/29/2020 RESOLVED: Syncope and collapse ICD-10-CM: R55 
ICD-9-CM: 780.2  7/13/2019 - 5/29/2020 RESOLVED: UTI (urinary tract infection) ICD-10-CM: N39.0 ICD-9-CM: 599.0  7/13/2019 - 11/2/2020 RESOLVED: Sepsis ([address] ICD-10-CM: A41.9 ICD-9-CM: 038.9, 995.91  7/13/2019 - 11/3/2020 RESOLVED: Leg wound, left ICD-10-CM: F26.603T ICD-9-CM: 891.0  7/13/2019 - 5/29/2020 RESOLVED: Leg laceration, right, initial encounter ICD-10-CM: P35.447J ICD-9-CM: 894.0  7/13/2019 - 5/29/2020 RESOLVED: Cellulitis of right upper arm ICD-10-CM: L03.113 ICD-9-CM: 682.3  5/17/2019 - 5/29/2020 RESOLVED: Gangrene of finger of right hand ([facility] ICD-10-CM: Z12 
ICD-9-CM: 785.4  5/17/2019 - 11/2/2020 RESOLVED: Bowel obstruction ([facility] ICD-10-CM: H29.506 ICD-9-CM: 560.9  1/8/2019 - 5/29/2020 RESOLVED: Partial small bowel obstruction ([facility] ICD-10-CM: K56.600 ICD-9-CM: 560.9  1/5/2019 - 5/29/2020 RESOLVED: Dyspnea ICD-10-CM: R06.00 
ICD-9-CM: 786.09  11/13/2018 - 5/29/2020 RESOLVED: ARF (acute renal failure) (HCC) ICD-10-CM: N17.9 ICD-9-CM: 584.9  11/13/2018 - 5/29/2020 RESOLVED: Closed wedge compression fracture of T7 vertebra ([facility] ICD-10-CM: C66.782D ICD-9-CM: 805.2  11/13/2018 - 5/29/2020 RESOLVED: Type 2 diabetes with nephropathy ([facility] ICD-10-CM: E11.21 
ICD-9-CM: 250.40, 583.81  10/1/2018 - 11/2/2020 RESOLVED: Chest pain ICD-10-CM: R07.9 ICD-9-CM: 786.50  6/27/2018 - 5/29/2020 RESOLVED: Abdominal pain ICD-10-CM: R10.9 ICD-9-CM: 789.00  6/27/2018 - 5/29/2020 RESOLVED: Coronary artery disease involving native coronary artery without angina pectoris (Chronic) ICD-10-CM: I25.10 ICD-9-CM: 414.01  1/22/2016 - 11/2/2020 RESOLVED: HTN (hypertension) ICD-10-CM: I10 
ICD-9-CM: 401.9  10/9/2015 - 1/22/2016 RESOLVED: NSTEMI (non-ST elevated myocardial infarction) ([facility] ICD-10-CM: I21.4 ICD-9-CM: 410.70  10/9/2015 - 5/29/2020 Subjective:  
 70 y.o. female with history that includes DM 2, CAD with MI, [facility] baseline, and A. fib on Eliquis who was recently admitted to [facility] with anemia, pneumonia, and acute on chronic CHF then transferred to rehab was sent back to the rehab due to anemia and dyspnea. She has been somewhat dyspneic for the past couple of days constant and worsening to the point of being severe even at rest with increased O2 requirements up to 4 LNC associated with orthopnea. She was found to have a hemoglobin of 6.8 at the rehab center. However, here in the ER she was found to have hemoglobin at 7.2 and she was back to her 3LNC. It was reported dark stools which tested FOBT positive. She is on Eliquis as mentioned. (Dr Dipika Arshad) 2/23:  Reports she if feeling better this AM with less SOB and O2 back to usual 3 liters. No ab pain. No CP. Sats ok on O2. Nurses report they are unable to get AM labs and pt needs central, PICC or mid line placed. She does not appear in any distress. Dr. Ainsley Cochran has ordered blood and consented pt. Her chronic GI bleeding may warrant complete DC of anticoagulation - discussed with GI and little else to do. Also consulting Cards for her chronic decompensation of CHF/A fib episodes with mult readmissions - there may be little to do for this either. 2/24:  C/O mild nausea this AM and somewhat fatigued but otherwise little change. Rate has been up and down requiring does of IV dig and Amiodarone drip. Cards also consulting EP for poss ablation. Cards and GI agree with restart of Eliquis - risk of CVA > risk of bleeds. 2/25:  Still c/o nausea and now having loose stools - 3 overnight. Declines capsule study for GI bleeding. Also, at this time, reports she does not want to have pacer or any other studies/treatments and wants to consider Hospice. Discussed risks v benefits of continued work up and treatment of problems but she reports \"getting tired of having things done to me. \"  She wants to have an information session with Hospice for further consideration. 2/26:  No nausea at this moment. Needed Zofran once last night and had one loose stool. CT of ab/pelvis reveals nothing major. Capsule study of small bowel today. She does not need to be off tele for the study as she has hard wired tele and not remote tele. She reports she will do the capsule study and \"if nothing to fix then maybe do that Hospice. \"  Back in sinus rhythm at this moment. 2/27:  More shortness of breath and back in A fib again - rate 100s - 110s. No nausea at this moment. Tmax 101.1. Tnow 99. Repeat cultures pending. Check port CXR. Lungs sound more congested this AM.  Hb 7.4 this AM.  Will get Card to see again now that she is back in A fib with symptoms. 2/28:  Feels fatigued and sleepy. No CP or shortness of breath. BP lower last night and Roland had to be started. Urine culture 2/26/21 with 100K gram neg rods and Merrem started. May need to go to ICU. She is going to be diff to manage her fluids/BP due to A fib, CHF and other med problems. Blood culture neg so far. Tmax 99.1. Hb 7.3. May need to transfuse again with her low BP. Cardiology is seeing and Pul reconsulted. 3/1:  Feeling a little better today in general.  Less sleepy. She is not moving about much - encouraged her to work with PT/OT. BP trending better but still low - still on pressor. Dilt and Metoprolol being held. Urine culture with E Coli ESBL again but sensitive to Merrem. Blood cult neg so far. Transfused 1 unit PRCs yesterday. Tearful and wants to consider hospice but also admits to feeling depressed - will add Remeron as she is on QTc prolonging meds. Consult had been placed to Hospice several days ago for information session. 3/2:  Continues to report feeling better. BP better this AM - yesterday evening had worsening MAP and gave small fluid bolus and upped Roland to 110 - can now titrate again. Episode of rapid A fib last night and Cards gave Amio bolus and started Amio drip. Discussed results of capsule study with pt. 3/3:  Feels worse today with return of nausea. No other complaints except \"just weak all over. \"   Back in sinus rhythm at this time. Off amio drip. Off pressor but BP still soft - may have to resume Roland. [place] discussion was held with pt about her medical problems and that most likely she is not going to improve to any significant degree - not very accepting of prognosis at this time. She did meet with hospice yesterday and still wishes to remain full code. Blood culture remains negative. Remains on Merrem for ESBL E Coli.   
 
3: More somnolent but awakens and will converse some. She reports the nausea has resolved and has no ab pain. Remains in sinus rhythm. BP better at times but still low at other times. Off pressor currently. She has discussed poss hospice with Palliative Care - decision still pending per patient. She may be going to hospice today. There may be little else to do at this point. 3/5:  Much more alert and less somnolent this AM.  C/0 more nausea this AM but no Ab pain. She pulled out her IJ last night and very hard to get IV sites per nurses. She is now on comfort care and will go to Hospice at [facility] on discharge later today. Remains in sinus. Will change the Zofran to 8mg q8 po since transitioning to hospice and comfort care. ESBL also sensitive to Levaquin po - will give today and let Hospice decide if further tx needed warranted. Meds per Hospice decision.   
  
Review of Systems: A comprehensive review of systems was negative except for that written in the HPI. Objective:  
Physical Exam:  
Visit Vitals BP (!) 128/53 Pulse 73 Temp 98.7 °F (37.1 °C) Resp 18 Ht 5' 7\" (1.702 m) Wt 110 kg (242 lb 8.1 oz) SpO2 97% BMI 37.98 kg/m² O2 Flow Rate (L/min): 3 l/min O2 Device: Nasal cannula Temp (24hrs), Av.7 °F (37.1 °C), Min:98.2 °F (36.8 °C), Max:99.2 °F (37.3 °C) No intake/output data recorded. No intake/output data recorded. General:  Alert, obese,cooperative, no distress, appears stated age. Head:  Normocephalic, without obvious abnormality, atraumatic. Eyes:  Conjunctivae/corneas clear. EOMs intact. Neck: Supple, symmetrical, trachea midline, no adenopathy, thyroid: no enlargement/tenderness/nodules, no carotid bruit and no JVD. Lungs:   Fairly clear except a few basilar rales bilaterally. Chest wall:  No tenderness or deformity. Heart:  Reg today with rate in 90s; 1/6 GRISELDA right 2nd ICS. Abdomen:   Soft, mild generalized tenderness without rebound or guarding. Bowel sounds normal. No masses,  No organomegaly. Extremities: no cyanosis. Trace LE edema on top of usual stasis changes. No calf tenderness or cords. Pulses: 2+ and symmetric all extremities. Skin:  turgor normal. No rashes other than stasis changes in LEs Neurologic:   Alert and oriented X 3. Less somnolent. Fine motor of hands and fingers normal.   equal.  No cogwheeling or rigidity. Gait not tested at this time. Sensation grossly normal to touch. Gross motor of extremities normal.    
 
Data Review: EXAM:  CT ABD PELV W WO CONT 2/25/21 INDICATION: unexplained anemia and abd pain COMPARISON: CT abdomen pelvis 3/23/2020. CONTRAST:  100 mL of Isovue-370. 
 FINDINGS:  
Lower Thorax: 
Lung Bases: Moderate left and small right pleural effusions with overlying 
subsegmental atelectasis in the lower lobes. Bilateral interlobular septal 
thickening with scattered groundglass opacities. Heart: Unchanged cardiomegaly. No pericardial effusion. Coronary arteries stents 
noted. A vascular catheter terminates at the cavoatrial junction. Abdomen/Pelvis: 
Liver:  No focal liver lesions. Biliary system: Gallbladder is surgically absent. No intrahepatic or 
extrahepatic biliary ductal dilatation.  
Spleen: Normal. 
Pancreas: Normal. 
 Kidneys/Ureters/Bladder: Multifocal cortical scarring in the kidneys. No renal 
masses. There is a 3 mm nonobstructing stone in the upper pole the right kidney, 
and a 2 mm nonobstructing stone in the lower pole the right kidney. No 
hydronephrosis or hydroureter. The bladder is normal. 
Adrenals: Normal. 
Stomach/bowel: No evidence of active GI bleeding. Incidental 2.1 cm gastric 
lipoma noted within the gastric body. No dilation or abnormal wall thickening is 
present. No free intraperitoneal air noted. Normal appendix. Fat-containing 
umbilical hernia, with postsurgical changes noted in the ventral abdominal wall. Reproductive Organs: Status post hysterectomy. No suspicious adnexal masses. Vasculature: Normal caliber arteries. Moderate calcific atherosclerosis of the 
abdominal aorta and iliac vasculature. There are recent postoperative changes in 
the left groin overlying the left femoral vessels, with prominent 
hyperdense/calcific material noted in this region and surgical clips. There is a 
small superficial fluid collection in the left groin measuring 2.1 x 1.4 cm 
(series 6 image 207). Portal vein, SMV, and splenic vein are patent. Nodes: No pathologically enlarged lymph nodes. Fluid: No free fluid. Bones/Soft Tissue: No acute fractures or aggressive osseous lesions are seen. Chronic L3 compression fracture status post kyphoplasty. Chronic T7 compression 
fracture status post kyphoplasty. There is nonspecific subcutaneous edema noted 
within the left lateral abdominal wall. IMPRESSION 1. No evidence of active GI bleeding. No evidence of acute process in the 
abdomen or pelvis. 2. Moderate left and small right pleural effusions with overlying lower lobe 
subsegmental atelectasis. Pulmonary interstitial and alveolar edema. 3. Left inguinal postoperative changes involving the left femoral vessels, with 
a small superficial fluid collection overlying the left femoral vessels measuring 2.1 x 1.4 cm. Correlate with history. Consider ultrasound to evaluate 
for pseudoaneurysm and/or hematoma. 4. Nonspecific subcutaneous edema in the left lateral abdominal wall. 
  
Recent Days: 
Recent Labs 03/03/21 
0110 WBC 8.9 HGB 8.7* HCT 28.9*  
 Recent Labs 03/03/21 
0110   
K 3.5  CO2 28  
GLU 93 BUN 11  
CREA 0.96  
CA 7.6* ALB 1.7* TBILI 0.5 ALT 10* No results for input(s): PH, PCO2, PO2, HCO3, FIO2 in the last 72 hours. 24 Hour Results: 
Recent Results (from the past 24 hour(s)) GLUCOSE, POC Collection Time: 03/04/21  8:12 AM  
Result Value Ref Range Glucose (POC) 110 (H) 65 - 100 mg/dL Performed by [place]on Dash GLUCOSE, POC Collection Time: 03/04/21 11:02 AM  
Result Value Ref Range Glucose (POC) 99 65 - 100 mg/dL Performed by [email or web address] Don SARS-COV-2 Collection Time: 03/04/21 12:40 PM  
Result Value Ref Range SARS-CoV-2 Please find results under separate order COVID-19 RAPID TEST Collection Time: 03/04/21 12:40 PM  
Result Value Ref Range Specimen source Nasopharyngeal    
 COVID-19 rapid test Not detected NOTD    
GLUCOSE, POC Collection Time: 03/04/21  5:07 PM  
Result Value Ref Range Glucose (POC) 89 65 - 100 mg/dL Performed by Pedro Banda GLUCOSE, POC Collection Time: 03/04/21  8:46 PM  
Result Value Ref Range Glucose (POC) 95 65 - 100 mg/dL Performed by Nabor Khalil (PCT) SARS-COV-2 Collection Time: 03/05/21  5:33 AM  
Result Value Ref Range SARS-CoV-2 Please find results under separate order GLUCOSE, POC Collection Time: 03/05/21  6:24 AM  
Result Value Ref Range Glucose (POC) 88 65 - 100 mg/dL Performed by Nabor Khalil (PCT) Medications reviewed Current Facility-Administered Medications Medication Dose Route Frequency  ondansetron (ZOFRAN ODT) tablet 8 mg  8 mg Oral Q6H PRN  
  traMADoL (ULTRAM) tablet 50 mg  50 mg Oral Q6H PRN  
 metoprolol tartrate (LOPRESSOR) tablet 50 mg  50 mg Oral BID  pantoprazole (PROTONIX) tablet 40 mg  40 mg Oral ACB&D  
 morphine injection 1 mg  1 mg IntraVENous Q4H PRN  mirtazapine (REMERON) tablet 15 mg  15 mg Oral QHS  ipratropium (ATROVENT) 0.02 % nebulizer solution 0.5 mg  0.5 mg Nebulization QID RT  
 0.9% sodium chloride infusion 250 mL  250 mL IntraVENous PRN  
 arformoteroL (BROVANA) neb solution 15 mcg  15 mcg Nebulization BID RT  
 budesonide (PULMICORT) 500 mcg/2 ml nebulizer suspension  500 mcg Nebulization BID RT  
 montelukast (SINGULAIR) tablet 10 mg  10 mg Oral QHS  nystatin (MYCOSTATIN) 100,000 unit/mL oral suspension 500,000 Units  500,000 Units Oral QID AFTER MEALS  
 amiodarone (CORDARONE) tablet 200 mg  200 mg Oral DAILY  insulin lispro (HUMALOG) injection   SubCUTAneous AC&HS  
 glucose chewable tablet 16 g  4 Tab Oral PRN  
 dextrose (D50W) injection syrg 12.5-25 g  25-50 mL IntraVENous PRN  
 glucagon (GLUCAGEN) injection 1 mg  1 mg IntraMUSCular PRN  
 sodium chloride (NS) flush 5-40 mL  5-40 mL IntraVENous Q8H  
 sodium chloride (NS) flush 5-40 mL  5-40 mL IntraVENous PRN  
 acetaminophen (TYLENOL) tablet 650 mg  650 mg Oral Q6H PRN Or  
 acetaminophen (TYLENOL) suppository 650 mg  650 mg Rectal Q6H PRN  polyethylene glycol (MIRALAX) packet 17 g  17 g Oral DAILY PRN  
 bisacodyL (DULCOLAX) suppository 10 mg  10 mg Rectal DAILY PRN  
 ondansetron (ZOFRAN) injection 4 mg  4 mg IntraVENous Q6H PRN  
 0.9% sodium chloride infusion 250 mL  250 mL IntraVENous PRN  
 apixaban (ELIQUIS) tablet 5 mg  5 mg Oral BID Care Plan discussed with: Patient and Nurse Total time spent with patient: 30 minutes.  
Brenda Puckett MD

## 2021-03-05 NOTE — PROGRESS NOTES
Spoke with Dr. Merna Sacks regarding patient's blood pressure and MEWs score of 3. Patient is expected to leave today to go on hospice and has no IV access. Rechecked blood pressure was 99/38. Dr. Merna Sacks requested we stop taking vitals due to patient's status. 03/05/21 0894 Vital Signs Temp 99 °F (37.2 °C) Temp Source Oral  
Pulse (Heart Rate) 83 Heart Rate Source Monitor Resp Rate 18  
O2 Sat (%) 97 % Level of Consciousness Alert  
BP (!) 80/40 
(nurse notified )  
MAP (Calculated) (!) 53 BP 1 Method Automatic MEWS Score 3 Patient converted into afib and then back to normal sinus. Discontinue telemetry.

## 2021-03-05 NOTE — PROGRESS NOTES
3/5/2021 11:22 AM  
Transition of Care Plan to SNF/Rehab 
 
SNF/Rehab Transition: 
Patient has been accepted to [facility] and meets criteria for admission. Patient will transported by [facility] and expected to leave at 1PM. Communication to Patient/Family: 
Called and spoke with pt's daughter, LEANNA([phone]) and they are agreeable to the transition plan. 2nd IMM letter reviewed over the phone. Communication to SNF/Rehab: 
Bedside RN, Chetan Guan., has been notified to update the transition plan to the facility and call report [phone]. Discharge information has been updated on the AVS. Discharge instructions to be fax'd to facility via [employer]. Pt's rapid COVID test also sent to [facility]. Patient has been identified as part of the [employer].  For Care Coordination associated with that Bundle Program, please contact ? Bundle information has been communication to [facility]. Nursing Please include all hard scripts for controlled substances, med rec and dc summary, and AVS in packet. SNF/Rehab Transition: 
Patient to follow-up with Home Health: [address] [place] [address] PCP/Specialist:  
 
Reviewed and confirmed with facility, [facility] they can manage the patient care needs for the following:  
 
Homer Rodriguez with (X) only those applicable: 
 
Medication: 
[]  Medications will be available at the facility []  IV Antibiotics []  Controlled Substance - hard copy to be sent with patient  
[]  Weekly Labs Documents: 
[] Hard RX 
[] MAR 
[] Kardex [] AVS 
[]Transfer Summary []Discharge Equipment: 
[]  CPAP/BiPAP []  Wound Vacuum 
[]  [place] or Urinary Device 
[]  PICC/Central Line 
[]  Nebulizer 
[]  Ventilator Treatment: 
[]Isolation (for MRSA, VRE, etc.) []Surgical Drain Management []Tracheostomy Care 
[]Dressing Changes []Dialysis with transportation and chair time . []PEG Care []Oxygen []Daily Weights for Heart Failure Dietary: []Any diet limitations []Tube Feedings []Total Parenteral Management (TPN) Eligible for Medicaid Long Term Services and Supports Yes: 
[] Eligible for medical assistance or will become eligible within 180 days and UAI completed. [] Provider/Patient and/or support system has requested screening. [] UAI copy provided to patient or responsible party, 
[] UAI unavailable at discharge will send once processed to SNF provider. [] UAI unavailable at discharged mailed to patient No:  
[] Private pay and is not financially eligible for Medicaid within the next 180 days. [] Reside out-of-state. [] A residents of a state owned/operated facility that is licensed 
by [place] [address] [address] [facility] [employer] or [facility] 
[] Enrollment in [facility] hospice services 
[] [address] 
[] Patient /Family declines to have screening completed or provide financial information for screening Financial Resources: 
Medicaid   
[] Initiated and application pending  
[] Full coverage Advanced Care Plan: 
[]Surrogate Decision Maker of Care 
[]POA [x]Communicated Code Status (DDNR\", \"Full\") Other 3/5/2021 8:59 AM EMR reviewed, noted nursing vitals from today. CM called to [facility], Evelin Tao who reported she will come to evaluate pt this AM to determine if pt is stable for discharge back to [facility]. Transport is set for 1PM for pt to return to [facility]. CM will follow up. MYLES Montoya

## 2021-03-05 NOTE — PROGRESS NOTES
0020 Pt pulled out IJ. Stabilized with 4x4 and pressure dressing. Dr. Charisse Ledesma notified, no new orders for IV access. Will notify day shift and continue to monitor. [id number] Left toe cleansed with NS and dressed with medi honey and wrapped in gauze. Right hand cleansed with NS and dressed with medi honey, exuderm and wrapped in gauze. Pt tolerated well.

## 2021-03-05 NOTE — DISCHARGE SUMMARY
Physician Discharge Summary Patient ID:   
Angelica Guillen [id number] 
83 y.o. 
1945 Kaylah Barber MD 
 
Admit date: 2/22/2021 Discharge date and time: 3/5/2021 Admission Diagnoses: Symptomatic anemia [D64.9];GIB (gastrointestinal bleeding) [K92.2]; Acute blood loss anemia [D62]; Dyspnea [R06.00] Discharge Medications:  
Current Discharge Medication List  
  
START taking these medications Details  
metoprolol tartrate (LOPRESSOR) 50 mg tablet Take 1 Tab by mouth two (2) times a day. Qty: 60 Tab, Refills: 0  
  
nystatin (MYCOSTATIN) 100,000 unit/mL suspension Take 5 mL by mouth four (4) times daily (after meals) for 10 days. swish and spit Qty: 200 mL, Refills: 0  
  
ondansetron (ZOFRAN ODT) 8 mg disintegrating tablet Take 1 Tab by mouth every eight (8) hours as needed for Nausea or Vomiting. Qty: 30 Tab, Refills: 0 CONTINUE these medications which have NOT CHANGED Details  
loperamide (IMODIUM) 2 mg capsule Take 2 Caps by mouth two (2) times daily as needed for Diarrhea. Qty: 30 Cap, Refills: 0  
  
amiodarone (CORDARONE) 200 mg tablet Take 1 Tab by mouth daily. Qty: 60 Tab, Refills: 0  
  
ondansetron hcl (Zofran) 4 mg tablet Take 4 mg by mouth every six (6) hours as needed for Nausea or Vomiting. polyethylene glycol (Miralax) 17 gram/dose powder Take 17 g by mouth daily as needed for Constipation. lipase-protease-amylase (CREON 24,000) 24,000-76,000 -120,000 unit capsule Take 1 Cap by mouth three (3) times daily (with meals). Qty: 90 Cap, Refills: 0  
  
pantoprazole (PROTONIX) 40 mg tablet Take 1 Tab by mouth Daily (before breakfast). Qty: 30 Tab, Refills: 0  
  
apixaban (ELIQUIS) 5 mg tablet Take 5 mg by mouth two (2) times a day. ferrous sulfate 325 mg (65 mg iron) tablet Take 1 Tab by mouth daily (with breakfast). Qty: 30 Tab, Refills: 0  
  
tiotropium bromide (Spiriva Respimat) 2.5 mcg/actuation inhaler Take 2 Puffs by inhalation daily. montelukast (Singulair) 10 mg tablet Take 10 mg by mouth every evening. sAXagliptin (ONGLYZA) 5 mg tab tablet Take 5 mg by mouth Daily (before dinner). acetaminophen (TYLENOL) 325 mg tablet Take 1 Tab by mouth every four (4) hours as needed for Pain. Qty: 30 Tab, Refills: 0  
  
magnesium oxide (MAG-OX) 400 mg tablet Take 400 mg by mouth nightly. mirtazapine (REMERON) 15 mg tablet Take 15 mg by mouth nightly. albuterol (PROVENTIL VENTOLIN) 2.5 mg /3 mL (0.083 %) nebulizer solution 2.5 mg by Nebulization route every six (6) hours as needed for Wheezing or Shortness of Breath. albuterol (PROAIR HFA) 90 mcg/actuation inhaler Take 2 Puffs by inhalation every four (4) hours as needed. mometasone-formoterol (DULERA) 200-5 mcg/actuation HFA inhaler Take 2 Puffs by inhalation two (2) times a day. STOP taking these medications  
  
 furosemide (LASIX) 40 mg tablet Comments:  
Reason for Stopping:   
   
 hydrocortisone (Preparation H Hydrocortisone) 1 % topical cream Comments:  
Reason for Stopping:   
   
 dilTIAZem ER (CARDIZEM CD) 300 mg capsule Comments:  
Reason for Stopping:   
   
 potassium chloride SR (K-TAB) 20 mEq tablet Comments:  
Reason for Stopping:   
   
 predniSONE (DELTASONE) 20 mg tablet Comments:  
Reason for Stopping:   
   
 phosphorus (K PHOS NEUTRAL) 250 mg tablet Comments:  
Reason for Stopping:   
   
 aspirin 81 mg chewable tablet Comments:  
Reason for Stopping:   
   
 alendronate (FOSAMAX) 70 mg tablet Comments:  
Reason for Stopping:   
   
 cyanocobalamin 1,000 mcg tablet Comments:  
Reason for Stopping:   
   
 lactobacillus rhamnosus gg 10 billion cell (CULTURELLE) 10 billion cell capsule Comments:  
Reason for Stopping:   
   
 biotin 10,000 mcg cap Comments:  
Reason for Stopping: DULoxetine (CYMBALTA) 60 mg capsule Comments:  
Reason for Stopping:   
   
 cholecalciferol, vitamin D3, (Vitamin D3) 50 mcg (2,000 unit) tab Comments: Reason for Stopping: Follow up Care: 1. Hospice 2. specialists as directed. Diet:  Regular Diet Disposition: 
SNF. Advanced Directive: 
Discharge Exam: [See today's progress note.] CONSULTATIONS: Cardiology, Pulmonary/Intensive care, Hematology/Oncology and Orthopedic Surgery Significant Diagnostic Studies:  
Recent Labs 03/03/21 0110 WBC 8.9 HGB 8.7* HCT 28.9*  
 Recent Labs 03/03/21 0110   
K 3.5  CO2 28 BUN 11  
CREA 0.96  
GLU 93  
CA 7.6* Recent Labs 03/03/21 0110 ALT 10* * TBILI 0.5 TP 4.1* ALB 1.7*  
GLOB 2.4 Lab Results Component Value Date/Time Glucose (POC) 88 03/05/2021 06:24 AM  
 Glucose (POC) 95 03/04/2021 08:46 PM  
 Glucose (POC) 89 03/04/2021 05:07 PM  
 Glucose (POC) 99 03/04/2021 11:02 AM  
 Glucose (POC) 110 (H) 03/04/2021 08:12 AM  
 
Lab Results Component Value Date/Time TSH 3.40 01/13/2021 11:29 AM  
 
HOSPITAL COURSE & TREATMENT RENDERED:  
1. See today's progress note: 
Daily Progress Note and Discharge Note: 3/5/2021 Jesus Lee MD 
  
   
Assessment/Plan:  
Acute blood loss anemia due to GIB / Symptomatic anemia:   
-  GI consulted. -  Capsule study resulted - AVMs not currently bleeding -  Further transfusions per Hospice decision 
  
Acute on chronic systolic CHF /  Elevated BNP/ A fib with RVR:   
-  Cardiology consulted. -  meds per Hospice and Cards 
  
Dyspnea: waxes and wanes -  Cont O2 as needed for comfort 
  
COPD: Appears to be stable and at baseline with no exacerbation.  Continue with baseline oxygen supplementation and increase as needed.  Scheduled and as needed bronchodilators. 
  
Atrial fibrillation ([facility] [address] (11/16/2018) on Eliquis:  
- per Cardiology and Hospice 
  
DM type 2:  Monitor blood glucose levels with insulin sliding scale coverage.  No basal coverage for now.  Monitor for complications.  
  
UTI - urine culture with E Coli ESBL sensitive to Merrem IV and Levaquin po 
  
Body mass index is 36.96 kg/m².: 30.0 - 39.9 Obese   
   
  
Problem List: 
          
Problem List as of 3/5/2021 Date Reviewed: 11/2/2020  
        Codes Class Noted - Resolved  
  Elevated brain natriuretic peptide (BNP) level ICD-10-CM: R79.89 ICD-9-CM: 790.99   2/23/2021 - Present  
     
  Acute blood loss anemia ICD-10-CM: D62 
ICD-9-CM: 285.1   2/23/2021 - Present  
     
  Dyspnea ICD-10-CM: R06.00 
ICD-9-CM: 786.09   2/23/2021 - Present  
     
  GIB (gastrointestinal bleeding) ICD-10-CM: K92.2 ICD-9-CM: 167. 9   2/23/2021 - Present  
     
  Symptomatic anemia ICD-10-CM: D64.9 ICD-9-CM: 080. 9   2/23/2021 - Present  
     
  Pneumonia ICD-10-CM: J18.9 ICD-9-CM: 131   1/26/2021 - Present  
     
  SOB (shortness of breath) ICD-10-CM: R06.02 
ICD-9-CM: 786.05   1/26/2021 - Present  
     
  Anemia ICD-10-CM: D64.9 ICD-9-CM: 050. 9   1/26/2021 - Present  
     
  Acute on chronic systolic CHF (congestive heart failure) (HCC) ICD-10-CM: Q07.59 ICD-9-CM: 428.23, 428.0   1/26/2021 - Present  
     
  Uncontrolled type 2 diabetes mellitus with hyperglycemia (HCC) ICD-10-CM: E11.65 ICD-9-CM: 250.02   1/26/2021 - Present  
     
  Left lower lobe pneumonia ICD-10-CM: J18.9 ICD-9-CM: 086   1/1/2021 - Present  
     
  Leukocytosis ICD-10-CM: Y30.061 ICD-9-CM: 288.60   11/3/2020 - Present  
     
  Chronic respiratory failure with hypoxia [facility]) ICD-10-CM: J96.11 
ICD-9-CM: 518.83, 799.02   11/2/2020 - Present  
  Overview Signed 11/2/2020 10:12 PM by Mundo Ruiz MD  
    On 3L NC at home  
   
  
     
  Cellulitis of lower extremity ICD-10-CM: L03.119 ICD-9-CM: 932. 6   3/23/2020 - Present  
     
  ASO (arteriosclerosis obliterans) ICD-10-CM: I70.90 ICD-9-CM: 440. 9   9/5/2019 - Present  
     
  PVD (peripheral vascular disease) ([facility] ICD-10-CM: I73.9 ICD-9-CM: 443. 9   8/1/2019 - Present  
     
   Severe obesity (HCC) ICD-10-CM: E66.01 
ICD-9-CM: 278.01   7/30/2019 - Present  
     
  COPD (chronic obstructive pulmonary disease) (HCC) ICD-10-CM: J44.9 ICD-9-CM: 496   7/13/2019 - Present  
     
  Normocytic anemia ICD-10-CM: D64.9 ICD-9-CM: 851. 9   7/13/2019 - Present  
     
  PAD (peripheral artery disease) (HCC) ICD-10-CM: I73.9 ICD-9-CM: 443. 9   7/13/2019 - Present  
     
  Mild intermittent asthma ICD-10-CM: J45.20 ICD-9-CM: 493.90   5/17/2019 - Present  
     
  Brachial artery thrombus (HCC) ICD-10-CM: Z52.5 ICD-9-CM: 444.21   4/26/2019 - Present  
     
  Sleep apnea ICD-10-CM: G47.30 ICD-9-CM: 780.57   1/5/2019 - Present  
  Overview Signed 1/5/2019  8:41 PM by Edwige Gaytan DO  
    wears CPAP 
   
  
     
  Atrial fibrillation ([facility] [address] ICD-10-CM: I48.91 
ICD-9-CM: 427.31   11/16/2018 - Present  
     
  Obesity (BMI 30-39.9) (Chronic) ICD-10-CM: F69.5 ICD-9-CM: 278.00   11/13/2018 - Present  
     
  Recurrent deep vein thrombosis (DVT) (HCC) ICD-10-CM: I82.409 ICD-9-CM: 453.40   3/30/2018 - Present  
     
  Chronic anticoagulation (Chronic) ICD-10-CM: Z79.01 
ICD-9-CM: V58.61   3/30/2018 - Present  
     
  Essential hypertension (Chronic) ICD-10-CM: I10 
ICD-9-CM: 933. 9   1/22/2016 - Present  
     
  CAD (coronary artery disease) ICD-10-CM: I25.10 ICD-9-CM: 414.00   10/9/2015 - Present  
     
  Type II diabetes mellitus (HCC) (Chronic) ICD-10-CM: E11.9 ICD-9-CM: 250.00   10/9/2015 - Present  
     
  RESOLVED: Syncope and collapse ICD-10-CM: R55 
ICD-9-CM: 780. 2   9/29/2020 - 11/2/2020  
     
  RESOLVED: Cellulitis ICD-10-CM: L03.90 ICD-9-CM: 185. 9   3/23/2020 - 5/29/2020  
     
  RESOLVED: Hypokalemia ICD-10-CM: E87.6 ICD-9-CM: 276.8   3/23/2020 - 5/29/2020  
     
  RESOLVED: Hyponatremia ICD-10-CM: E87.1 ICD-9-CM: 276.1   3/23/2020 - 5/29/2020  
     
  RESOLVED: Diarrhea ICD-10-CM: R19.7 ICD-9-CM: 787.91   3/23/2020 - 5/29/2020  
     
   RESOLVED: Syncope and collapse ICD-10-CM: R55 
ICD-9-CM: 780. 2   7/13/2019 - 5/29/2020  
     
  RESOLVED: UTI (urinary tract infection) ICD-10-CM: N39.0 ICD-9-CM: 599.0   7/13/2019 - 11/2/2020  
     
  RESOLVED: Sepsis ([facility] ICD-10-CM: A41.9 ICD-9-CM: 038.9, 995.91   7/13/2019 - 11/3/2020  
     
  RESOLVED: Leg wound, left ICD-10-CM: B76.877S ICD-9-CM: 891.0   7/13/2019 - 5/29/2020  
     
  RESOLVED: Leg laceration, right, initial encounter ICD-10-CM: Q00.002Z ICD-9-CM: 894.0   7/13/2019 - 5/29/2020  
     
  RESOLVED: Cellulitis of right upper arm ICD-10-CM: L03.113 ICD-9-CM: 939. 3   5/17/2019 - 5/29/2020  
     
  RESOLVED: Gangrene of finger of right hand ([facility] ICD-10-CM: W72 
ICD-9-CM: 785. 4   5/17/2019 - 11/2/2020  
     
  RESOLVED: Bowel obstruction ([facility] ICD-10-CM: I63.797 ICD-9-CM: 560.9   1/8/2019 - 5/29/2020  
     
  RESOLVED: Partial small bowel obstruction ([facility] ICD-10-CM: K56.600 ICD-9-CM: 560.9   1/5/2019 - 5/29/2020  
     
  RESOLVED: Dyspnea ICD-10-CM: R06.00 
ICD-9-CM: 786.09   11/13/2018 - 5/29/2020  
     
  RESOLVED: ARF (acute renal failure) (HCC) ICD-10-CM: N17.9 ICD-9-CM: 390. 9   11/13/2018 - 5/29/2020  
     
  RESOLVED: Closed wedge compression fracture of T7 vertebra (HCC) ICD-10-CM: U83.747O ICD-9-CM: 260. 2   11/13/2018 - 5/29/2020  
     
  RESOLVED: Type 2 diabetes with nephropathy ([address] ICD-10-CM: E11.21 
ICD-9-CM: 250.40, 583.81   10/1/2018 - 11/2/2020  
     
  RESOLVED: Chest pain ICD-10-CM: R07.9 ICD-9-CM: 786.50   6/27/2018 - 5/29/2020  
     
  RESOLVED: Abdominal pain ICD-10-CM: R10.9 ICD-9-CM: 789.00   6/27/2018 - 5/29/2020  
     
  RESOLVED: Coronary artery disease involving native coronary artery without angina pectoris (Chronic) ICD-10-CM: I25.10 ICD-9-CM: 414.01   1/22/2016 - 11/2/2020  
     
  RESOLVED: HTN (hypertension) ICD-10-CM: I10 
ICD-9-CM: 677. 9   10/9/2015 - 1/22/2016  
     
   RESOLVED: NSTEMI (non-ST elevated myocardial infarction) ([facility] [address] ICD-10-CM: I21.4 ICD-9-CM: 410.70   10/9/2015 - 5/29/2020  
     
   
  
Subjective:  
 70 y. o. female with history that includes DM 2, CAD with MI, COPD on 3LNC baseline, and A. fib on Eliquis who was recently admitted to [facility] anemia, pneumonia, and acute on chronic CHF then transferred to rehab was sent back to the rehab due to anemia and dyspnea. Lakisha Curran has been somewhat dyspneic for the past couple of days constant and worsening to the point of being severe [facility] increased O2 requirements up to 4 LNC associated with orthopnea.  She was found to have a hemoglobin of 6.8 at the rehab center. [facility], here in the ER she was found to have hemoglobin at 7.2 and she was back to her 3LNC.  It was reported dark stools which tested FOBT positive.  She is on Eliquis as mentioned. (Dr Noy Turcios) 
  
2/23:  Reports she if feeling better this AM with less SOB and O2 back to usual 3 liters. No ab pain. No CP. Sats ok on O2. Nurses report they are unable to get AM labs and pt needs central, PICC or mid line placed. She does not appear in any distress. Dr. Noy Turcios has ordered blood and consented pt. Her chronic GI bleeding may warrant complete DC of anticoagulation - discussed with GI and little else to do. Also consulting Cards for her chronic decompensation of CHF/A fib episodes with mult readmissions - there may be little to do for this either.  
  
2/24:  C/O mild nausea this AM and somewhat fatigued but otherwise little change. Rate has been up and down requiring does of IV dig and Amiodarone drip. Cards also consulting EP for poss ablation.   Cards and GI agree with restart of Eliquis - risk of CVA > risk of bleeds.   
  
 2/25:  Still c/o nausea and now having loose stools - 3 overnight. Declines capsule study for GI bleeding. Also, at this time, reports she does not want to have pacer or any other studies/treatments and wants to consider Hospice. Discussed risks v benefits of continued work up and treatment of problems but she reports \"getting tired of having things done to me. \"  She wants to have an information session with Hospice for further consideration.   
  
2/26:  No nausea at this moment. Needed Zofran once last night and had one loose stool. CT of ab/pelvis reveals nothing major. Capsule study of small bowel today. She does not need to be off tele for the study as she has hard wired tele and not remote tele. She reports she will do the capsule study and \"if nothing to fix then maybe do that Hospice. \"  Back in sinus rhythm at this moment. 
  
2/27:  More shortness of breath and back in A fib again - rate 100s - 110s. No nausea at this moment. Tmax 101.1. Tnow 99. Repeat cultures pending. Check port CXR. Lungs sound more congested this AM.  Hb 7.4 this AM.  Will get Card to see again now that she is back in A fib with symptoms.  
  
2/28:  Feels fatigued and sleepy. No CP or shortness of breath. BP lower last night and Roland had to be started. Urine culture 2/26/21 with 100K gram neg rods and Merrem started. May need to go to ICU. She is going to be diff to manage her fluids/BP due to A fib, CHF and other med problems. Blood culture neg so far. Tmax 99.1. Hb 7.3. May need to transfuse again with her low BP.   Cardiology is seeing and Pul reconsulted.   
  
 3/1:  Feeling a little better today in general.  Less sleepy. She is not moving about much - encouraged her to work with PT/OT. BP trending better but still low - still on pressor. Dilt and Metoprolol being held. Urine culture with E Coli ESBL again but sensitive to Merrem. Blood cult neg so far. Transfused 1 unit PRCs yesterday. Tearful and wants to consider hospice but also admits to feeling depressed - will add Remeron as she is on QTc prolonging meds. Consult had been placed to Hospice several days ago for information session.   
  
3/2:  Continues to report feeling better. BP better this AM - yesterday evening had worsening MAP and gave small fluid bolus and upped Roland to 110 - can now titrate again. Episode of rapid A fib last night and Cards gave Amio bolus and started Amio drip. Discussed results of capsule study with pt.   
  
3/3:  Feels worse today with return of nausea. No other complaints except \"just weak all over. \"   Back in sinus rhythm at this time. Off amio drip. Off pressor but BP still soft - may have to resume Roland. Alena Purcells discussion was held with pt about her medical problems and that most likely she is not going to improve to any significant degree - not very accepting of prognosis at this time. She did meet with hospice yesterday and still wishes to remain full code. Blood culture remains negative. Remains on Merrem for ESBL E Coli.   
  
3/4: More somnolent but awakens and will converse some. She reports the nausea has resolved and has no ab pain. Remains in sinus rhythm. BP better at times but still low at other times. Off pressor currently. She has discussed poss hospice with Palliative Care - decision still pending per patient. She may be going to hospice today.   There may be little else to do at this point.   
  
 3/5:  Much more alert and less somnolent this AM.  C/0 more nausea this AM but no Ab pain. She pulled out her IJ last night and very hard to get IV sites per nurses. She is now on comfort care and will go to Hospice at [facility] on discharge later today. Remains in sinus. Will change the Zofran to 8mg q8 po since transitioning to hospice and comfort care. ESBL also sensitive to Levaquin po - will give today and let Hospice decide if further tx needed warranted. Meds per Hospice decision.   
  
Review of Systems: A comprehensive review of systems was negative except for that written in the HPI. 
  
Objective:  
Physical Exam:  
Visit Vitals BP (!) 128/53 Pulse 73 Temp 98.7 °F (37.1 °C) Resp 18 Ht 5' 7\" (1.702 m) Wt 110 kg (242 lb 8.1 oz) SpO2 97% BMI 37.98 kg/m² O2 Flow Rate (L/min): 3 l/min O2 Device: Nasal cannula Temp (24hrs), Av.7 °F (37.1 °C), Min:98.2 °F (36.8 °C), Max:99.2 °F (37.3 °C) No intake/output data recorded. No intake/output data recorded. General:  Alert, obese,cooperative, no distress, appears stated age. Head:  Normocephalic, without obvious abnormality, atraumatic. Eyes:  Conjunctivae/corneas clear. EOMs intact. Neck: Supple, symmetrical, trachea midline, no adenopathy, thyroid: no enlargement/tenderness/nodules, no carotid bruit and no JVD. Lungs:   Fairly clear except a few basilar rales bilaterally. Chest wall:  No tenderness or deformity. Heart:  Reg today with rate in 90s; 1/6 GRISELDA right 2nd ICS. Abdomen:   Soft, mild generalized tenderness without rebound or guarding. Bowel sounds normal. No masses,  No organomegaly. Extremities: no cyanosis. Trace LE edema on top of usual stasis changes. No calf tenderness or cords. Pulses: 2+ and symmetric all extremities. Skin:  turgor normal. No rashes other than stasis changes in LEs Neurologic:   Alert and oriented X 3. Less somnolent. Fine motor of hands and fingers normal.   equal.  No cogwheeling or rigidity. Gait not tested at this time. Sensation grossly normal to touch. Gross motor of extremities normal.    
  
Data Review: EXAM:  CT ABD PELV W WO CONT 2/25/21 INDICATION: unexplained anemia and abd pain COMPARISON: CT abdomen pelvis 3/23/2020. CONTRAST:  100 mL of Isovue-370. 
 FINDINGS:  
Lower Thorax: 
Lung Bases: Moderate left and small right pleural effusions with overlying 
subsegmental atelectasis in the lower lobes. Bilateral interlobular septal 
thickening with scattered groundglass opacities. Heart: Unchanged cardiomegaly. No pericardial effusion. Coronary arteries stents 
noted. A vascular catheter terminates at the cavoatrial junction. Abdomen/Pelvis: 
Liver:  No focal liver lesions. Biliary system: Gallbladder is surgically absent. No intrahepatic or 
extrahepatic biliary ductal dilatation. Spleen: Normal. 
Pancreas: Normal. 
Kidneys/Ureters/Bladder: Multifocal cortical scarring in the kidneys. No renal 
masses. There is a 3 mm nonobstructing stone in the upper pole the right kidney, 
and a 2 mm nonobstructing stone in the lower pole the right kidney. No 
hydronephrosis or hydroureter. The bladder is normal. 
Adrenals: Normal. 
Stomach/bowel: No evidence of active GI bleeding. Incidental 2.1 cm gastric 
lipoma noted within the gastric body. No dilation or abnormal wall thickening is 
present. No free intraperitoneal air noted. Normal appendix. Fat-containing 
umbilical hernia, with postsurgical changes noted in the ventral abdominal wall. Reproductive Organs: Status post hysterectomy. No suspicious adnexal masses. Vasculature: Normal caliber arteries. Moderate calcific atherosclerosis of the 
abdominal aorta and iliac vasculature. There are recent postoperative changes in 
the left groin overlying the left femoral vessels, with prominent hyperdense/calcific material noted in this region and surgical clips. There is a 
small superficial fluid collection in the left groin measuring 2.1 x 1.4 cm 
(series 6 image 207). Portal vein, SMV, and splenic vein are patent. Nodes: No pathologically enlarged lymph nodes. Fluid: No free fluid. Bones/Soft Tissue: No acute fractures or aggressive osseous lesions are seen. Chronic L3 compression fracture status post kyphoplasty. Chronic T7 compression 
fracture status post kyphoplasty. There is nonspecific subcutaneous edema noted 
within the left lateral abdominal wall. IMPRESSION 1. No evidence of active GI bleeding. No evidence of acute process in the 
abdomen or pelvis. 2. Moderate left and small right pleural effusions with overlying lower lobe 
subsegmental atelectasis. Pulmonary interstitial and alveolar edema. 3. Left inguinal postoperative changes involving the left femoral vessels, with 
a small superficial fluid collection overlying the left femoral vessels 
measuring 2.1 x 1.4 cm. Correlate with history. Consider ultrasound to evaluate 
for pseudoaneurysm and/or hematoma.  
4. Nonspecific subcutaneous edema in the left lateral abdominal wall. 
  
Signed: 
Micah Garay MD 
3/5/2021 
7:58 AM

## 2021-03-05 NOTE — HOSPICE
Hospice Liaison: 
 
Bedside visit with patient, who is awake, alert and oriented. She states that she is feeling, \"ok\" today; complains of stomach feeling \"achy\". Pt admitted to having a BM yesterday, and did not feel constipated. [facility]. Pt stated that she did not know her discharge was today, but agreed that she was ready to discharge, and would be \"feeling better\" by 1:00pm; time transportation has been arranged. Hospice Admission time confirmed for 3:00pm today. Ludmila Gastelum RN, [facility] Nurse Liaison [phone] [place] [phone] Office

## 2021-03-05 NOTE — PROGRESS NOTES
Palliative Medicine Consult Vin: [phone] ([phone]) Patient Name: Rosanna Kayser YOB: 1945 Date of Initial Consult: 2/26/2021 Reason for Consult: [address] discussion Requesting Provider: Dr. Mackenzie Courser Primary Care Physician: Ankur Jacob MD 
 
 SUMMARY:  
Rosanna Kayser is a 70 y.o. with a past history of DM, MI, CHF, HTN, COPD on 3 L NC, DVT, A. fib on Eliquis, anemia of unknown etiology, obesity, CAD, PAD, sleep apnea with CPAP and history of bowel resection. Patient presented to the ER from [facility], with CC of shortness of breath x1 day, a hemoglobin of 6.8 and an abnormal CXR. In the ER patient patients BP was soft. She was tachycardic and in A. fib, started on amiodarone drip. She was also requiring slightly more O2 support than baseline, with 4 L nasal cannula. . In ER her hemoglobin was actually 7.2, however she was FOBT positive. Labs also significant for leukocytosis, mildly elevated creatinine and albumin 2.4. CXR significant for cardiomegaly and small bilateral pleural effusions and mild pulmonary edema. Patient transfused 1 unit PRBCs and was admitted on 2/22/2021 f with a diagnosis of GI bleed. Chart reviewed- 
 
Palliative medicine team has not met with  Ms. Chay Montes before today, however this is her 6th hospitalization over the past 12 months. 
 
-3/233/31/2020- SIRS due to lower extremity cellulitis 
-5/296/2/2020-sepsis 2/2 UTI, +kleb and Ecoli ESBL. -9/2910/12/2020 for syncope and collapse, likely due to hypotension from anemia. -11/211/6/2020 with sepsis secondary to LLE cellulitis 1/262/9/2021 with anemia, acute on chronic CHF and PNA with associated respiratory failure. - 1/11/15/2021 with left lobe PNA, GI bleed and A. fib with RVR. Course of hospitalization:   Initially had ongoing issues with PAF requiring amiodarone drip and digoxin, was considering possible pacemaker and AV node ablation however on 2/24 she converted to NSR and pacer put on hold. Patient anemic however hemoglobin fairly stable, has not required additional transfusions. On 2/26 she underwent GI PillCam, results pending. 2/26 UA positive UTI. Current medical issues leading to Palliative Medicine involvement include: GOC Discussion PALLIATIVE DIAGNOSES:  
 
1. Concern about end of life 2. Weakness, generalized 3. Debility 4. Poor nutrition 5. Hypoalbuminemia 6. DNR discussion 7. Goals of care discussion PLAN:  
1. Prior to visit I spoke with patients nurse Sudeep Alejandro RN. Had received message that patient undecided re Hospice 2. Patient was seen, no family at bedside. She appeared to be sleeping, was slow to wake. 3. Patient denied pain, denied SOB, denied being hungry, stated that she was tired. 4. Patient recalled part of discussion from day before, again stated \"im not going to get better\". We discussed Hospice briefly and patient agreed discharge back to [address] [place] [address] with Hospice. 5. Pain- Abdominal Pain. Patient denied pain at time of visit, see info below. · FU to assess effectiveness of  adjustments made to pain medication regimen the day before. Patient has order for Morphine 1mg IV every 4 hours as needed, mod to severe pain, she used x 1 over past 24 hours and is currently denying pain. No recommendations to change regimen at this time. GOALS OF CARE / TREATMENT PREFERENCES:  
 
GOALS OF CARE: 
Patient/Health Care Proxy Stated Goals: Comfort TREATMENT PREFERENCES:  
Code Status: DNR Advance Care Planning: 
[x] The [facility] Interdisciplinary Team has updated the ACP Navigator with [address] and Patient Capacity Primary Decision MakerSmushtaq Reddy - Daughter - [phone] Advance Care Planning 2/25/2021 Patient's Healthcare Decision Maker is: -  
Primary Decision Maker Name -  
Confirm Advance Directive None Patient Would Like to Complete Advance Directive No  
Does the patient have other document types - Medical Interventions: Limited additional interventions Other Instructions:  
Artificially Administered Nutrition: No feeding tube Other: As far as possible, the palliative care team has discussed with patient / health care proxy about goals of care / treatment preferences for patient. HISTORY:  
 
History obtained from: medical records/ nurse/ patient CHIEF COMPLAINT: I have abdominal pain HPI/SUBJECTIVE: The patient is:  
[x] Verbal and participatory [] Non-participatory due to:  
Patient stated that she has abdominal pain, see blow for info. She reported feeling tired, no appetite, does not want anything to eat, nothing seems appealing. 2/26- CXR increasing pulmonary edema and persistent  left pleural effusion and  LL atelectaisis 2/27- Back in afib, increased SOB, temp 101.1, repeat cultures pending. CXR +cardiomegaly, +Mild pulmonary edema and possible left pleural effusion without significant change. 2/28- Hypotensive, roland started. Urine cx + gram neg, started on Merrem. Hgb down 7.3, will need transfusion. Pulmonal reconsulted, patient w/ mod to severe COPD, hypotension 2/2 sepsis, cardiac dx, anemia, now in afib with rvr and severe hypoalbuminemia.  with volume overload 3/1- Maxed on roland, patient poor intake, severe hypoalbuminemia, 3L nasal canula, Pill cam resulted, + multiple AVMs, however no active bleeding . 3/2- Required amiodarone drip earlier in day, has since stopped. Roland stopped this am. On 3L nasal canula 3/4- On 3L, sleeping quite a bit, minimal sips. Poor intake, plan to discharge back to [address] [place] [address] with Hospice. Clinical Pain Assessment (nonverbal scale for severity on nonverbal patients):  
Clinical Pain Assessment Severity: 5 Location: abdomen Character: unable to describe Duration: unable to describe Effect: unable to describe Factors: unable to describe Frequency: unable to describe Activity (Movement): Lying quietly, normal position Duration: for how long has pt been experiencing pain (e.g., 2 days, 1 month, years) Frequency: how often pain is an issue (e.g., several times per day, once every few days, constant) FUNCTIONAL ASSESSMENT:  
 
Palliative Performance Scale (PPS): PPS: 30 
 
 
 PSYCHOSOCIAL/SPIRITUAL SCREENING:  
 
Palliative IDT has assessed this patient for cultural preferences / practices and a referral made as appropriate to needs (Cultural Services, Patient Advocacy, Ethics, etc.) Any spiritual / Jain concerns: 
[] Yes /  [x] No 
 
Caregiver Burnout: 
[] Yes /  [x] No /  [] No Caregiver Present Anticipatory grief assessment:  
[x] Normal  / [] Maladaptive ESAS Anxiety: Anxiety: 0 
 
ESAS Depression: Depression: 0 REVIEW OF SYSTEMS:  
 
Positive and pertinent negative findings in ROS are noted above in HPI. The following systems were [x] reviewed / [] unable to be reviewed as noted in HPI Other findings are noted below. Systems: constitutional, ears/nose/mouth/throat, respiratory, gastrointestinal, genitourinary, musculoskeletal, integumentary, neurologic, psychiatric, endocrine. Positive findings noted below. Modified ESAS Completed by: provider Fatigue: 10 Drowsiness: 7 Depression: 0 Pain: 5 Anxiety: 0 Nausea: 0 Anorexia: 10 Dyspnea: 0 Stool Occurrence(s): 1 PHYSICAL EXAM:  
 
From RN flowsheet: 
Wt Readings from Last 3 Encounters:  
03/03/21 241 lb 12.8 oz (109.7 kg) 02/15/21 232 lb (105.2 kg) 02/08/21 254 lb (115.2 kg) Blood pressure (!) 98/55, pulse 62, temperature 98.7 °F (37.1 °C), resp. rate 18, height 5' 7\" (1.702 m), weight 241 lb 12.8 oz (109.7 kg), SpO2 100 %. Pain Scale 1: Numeric (0 - 10) Pain Intensity 1: 0 
 Pain Onset 1: acute Pain Location 1: Abdomen Pain Orientation 1: Mid 
Pain Description 1: Aching Pain Intervention(s) 1: Medication (see MAR) Last bowel movement, if known:  
 
Constitutional: Appears stated age, large body habitus,fatigued, weakness, NAD Eyes: pupils equal, anicteric, watery ENMT: no nasal discharge, very dry mucous membranes Cardiovascular: regular rhythm, distal pulses intact Respiratory: mildly labored, symmetric, pursed lip breathing Gastrointestinal: large, soft non-tender, +bowel sounds Musculoskeletal:  no deformity, no tenderness to palpation Skin: warm, dry, edema in upper and lower extremities Neurologic: intermittently drowsy, did wake and participate in discussion, is able to follow commands, moving all extremities Psychiatric: restricted affect, no hallucinations Other: 
 
 
 HISTORY:  
 
Active Problems: 
  Type II diabetes mellitus ([address] (10/9/2015) Essential hypertension (1/22/2016) Atrial fibrillation ([address] (11/16/2018) COPD (chronic obstructive pulmonary disease) [facility] (7/13/2019) Acute on chronic systolic CHF (congestive heart failure) ([address] (1/26/2021) Elevated brain natriuretic peptide (BNP) level (2/23/2021) Acute blood loss anemia (2/23/2021) Dyspnea (2/23/2021) GIB (gastrointestinal bleeding) (2/23/2021) Symptomatic anemia (2/23/2021) Past Medical History:  
Diagnosis Date  Asthma  Atrial fibrillation ([address] 11/16/2018  Autoimmune disease ([address]   
 fibromyalgia  CAD (coronary artery disease) 10/9/2015  Closed wedge compression fracture of T7 vertebra ([address] 11/13/2018  COPD (chronic obstructive pulmonary disease) (HCC)  Diabetes ([address]  DVT (deep vein thrombosis) in pregnancy  GERD (gastroesophageal reflux disease)  Heart failure ([address]  History of vascular access device 09/30/2020  
 4f midline, 12cm at 1cm out placed in L Cephalic by Yohana Osorio RN  
 HTN (hypertension)  NSTEMI (non-ST elevated myocardial infarction) ([facility] 10/9/2015  Obesity (BMI 30-39.9) 11/13/2018  PAD (peripheral artery disease) ([facility] prior stenting  Sleep apnea   
 wears CPAP  
 Statin intolerance Past Surgical History:  
Procedure Laterality Date  COLONOSCOPY N/A 1/4/2021 COLONOSCOPY performed by Kendra Cheema MD at [address] [facility] [address]  HX COLOSTOMY    
 and reversal, post episiotomy repair [address]  HX UROLOGICAL  2009  
 bladder sling  IR KYPHOPLASTY LUMBAR  10/8/2020  CA ABDOMEN SURGERY PROC UNLISTED    
 hital hernia repair, gall bladder removed  CA AMPUTATION TRANSMETACARPAL Right 06/12/2019  
 entire ring finger, 2nd & 3rd fingers (transmetacarpal)  CA BREAST SURGERY PROCEDURE UNLISTED    
 lumpectomy  CA CARDIAC SURG PROCEDURE UNLIST  10/9/15  
 2 stents Family History Problem Relation Age of Onset  Diabetes Mother  Heart Failure Mother  Kidney Disease Mother  Diabetes Father  Heart Disease Father  Elevated Lipids Father  Heart Failure Father  Heart Disease Brother  Cancer Brother   
     kidney  Diabetes Brother  No Known Problems Brother  No Known Problems Brother History reviewed, no pertinent family history. Social History Tobacco Use  Smoking status: Former Smoker Quit date: 3/30/2017 Years since quitting: 3.9  Smokeless tobacco: Never Used Substance Use Topics  Alcohol use: No  
  Alcohol/week: 0.0 standard drinks Comment: very very rarely Allergies Allergen Reactions  Advair Diskus [Fluticasone Propion-Salmeterol] Rash  Aspartame Other (comments)  Breo Ellipta [Fluticasone Furoate-Vilanterol] Rash  Bumex [Bumetanide] Myalgia  Ciprofloxacin Rash  Statins-Hmg-Coa Reductase Inhibitors Other (comments) Muscle pain Lipitor/crestor/zocor  Sulfa (Sulfonamide Antibiotics) Rash  Tetracycline Other (comments) musclepain  Torsemide Rash and Myalgia  Zetia [Ezetimibe] Myalgia Current Facility-Administered Medications Medication Dose Route Frequency  metoprolol tartrate (LOPRESSOR) tablet 50 mg  50 mg Oral BID  pantoprazole (PROTONIX) tablet 40 mg  40 mg Oral ACB&D  
 morphine injection 1 mg  1 mg IntraVENous Q4H PRN  mirtazapine (REMERON) tablet 15 mg  15 mg Oral QHS  ipratropium (ATROVENT) 0.02 % nebulizer solution 0.5 mg  0.5 mg Nebulization QID RT  
 0.9% sodium chloride infusion 250 mL  250 mL IntraVENous PRN  
 arformoteroL (BROVANA) neb solution 15 mcg  15 mcg Nebulization BID RT  
 budesonide (PULMICORT) 500 mcg/2 ml nebulizer suspension  500 mcg Nebulization BID RT  
 montelukast (SINGULAIR) tablet 10 mg  10 mg Oral QHS  nystatin (MYCOSTATIN) 100,000 unit/mL oral suspension 500,000 Units  500,000 Units Oral QID AFTER MEALS  
 amiodarone (CORDARONE) tablet 200 mg  200 mg Oral DAILY  insulin lispro (HUMALOG) injection   SubCUTAneous AC&HS  
 glucose chewable tablet 16 g  4 Tab Oral PRN  
 dextrose (D50W) injection syrg 12.5-25 g  25-50 mL IntraVENous PRN  
 glucagon (GLUCAGEN) injection 1 mg  1 mg IntraMUSCular PRN  
 sodium chloride (NS) flush 5-40 mL  5-40 mL IntraVENous Q8H  
 sodium chloride (NS) flush 5-40 mL  5-40 mL IntraVENous PRN  
 acetaminophen (TYLENOL) tablet 650 mg  650 mg Oral Q6H PRN Or  
 acetaminophen (TYLENOL) suppository 650 mg  650 mg Rectal Q6H PRN  polyethylene glycol (MIRALAX) packet 17 g  17 g Oral DAILY PRN  
 bisacodyL (DULCOLAX) suppository 10 mg  10 mg Rectal DAILY PRN  
 ondansetron (ZOFRAN) injection 4 mg  4 mg IntraVENous Q6H PRN  
 0.9% sodium chloride infusion 250 mL  250 mL IntraVENous PRN  
 apixaban (ELIQUIS) tablet 5 mg  5 mg Oral BID  
 
 
 
 LAB AND IMAGING FINDINGS:  
 
Lab Results Component Value Date/Time  WBC 8.9 03/03/2021 01:10 AM  
 HGB 8.7 (L) 03/03/2021 01:10 AM  
 PLATELET 667 08/52/9456 01:10 AM  
 
Lab Results Component Value Date/Time Sodium 140 03/03/2021 01:10 AM  
 Potassium 3.5 03/03/2021 01:10 AM  
 Chloride 105 03/03/2021 01:10 AM  
 CO2 28 03/03/2021 01:10 AM  
 BUN 11 03/03/2021 01:10 AM  
 Creatinine 0.96 03/03/2021 01:10 AM  
 Calcium 7.6 (L) 03/03/2021 01:10 AM  
 Magnesium 2.2 02/23/2021 09:19 PM  
 Phosphorus 2.2 (L) 01/03/2021 01:59 AM  
  
Lab Results Component Value Date/Time Alk. phosphatase 188 (H) 03/03/2021 01:10 AM  
 Protein, total 4.1 (L) 03/03/2021 01:10 AM  
 Albumin 1.7 (L) 03/03/2021 01:10 AM  
 Globulin 2.4 03/03/2021 01:10 AM  
 
Lab Results Component Value Date/Time INR 1.1 02/22/2021 10:46 PM  
 Prothrombin time 11.1 02/22/2021 10:46 PM  
 aPTT 26.1 02/22/2021 10:46 PM  
  
Lab Results Component Value Date/Time Iron 24 (L) 02/25/2021 05:45 AM  
 TIBC 217 (L) 02/25/2021 05:45 AM  
 Iron % saturation 11 (L) 02/25/2021 05:45 AM  
 Ferritin 105 02/25/2021 05:45 AM  
  
No results found for: PH, PCO2, PO2 No components found for: [place] Lab Results Component Value Date/Time CK 18 (L) 05/21/2019 06:13 AM  
 CK - MB 1.1 11/13/2018 03:26 PM  
  
 
 
   
 
Total time: 15 min Counseling / coordination time, spent as noted above: 10 min 
> 50% counseling / coordination?: yes Prolonged service was provided for  []30 min   []75 min in face to face time in the presence of the patient, spent as noted above. Time Start:  
Time End:  
Note: this can only be billed with 08304 (initial) or 30208 (follow up). If multiple start / stop times, list each separately.

## 2021-03-08 NOTE — PROGRESS NOTES
Patient actively a bundle patient that was discharge home with hospice through [facility]. LPN Care Coordinator will follow up with the hospice company every 30 days for the next 90 days.

## 2021-03-11 NOTE — PROGRESS NOTES
Physician Progress Note Coty Thompson 
CSN #:                  [id number] :                       1945 ADMIT DATE:       2021 9:58 PM 
[address] [place] DATE:        3/5/2021 12:15 PM 
RESPONDING 
PROVIDER #:        Breana Renee MD 
 
 
 
 
QUERY TEXT: 
 
Dear [facility] Team, 
Pt admitted with GIB. Pt noted to have SIRS on - of WBC:13.4 HR 8= RR 33 temperature 101.1. Patient with positive E.coli UTI infection. If possible, please document in the progress notes and discharge summary if you are evaluating and /or treating any of the following: The medical record reflects the following: 
 
Risk Factors: 70 Yr F admitted with GIB Clinical Indicators: Patient admitted on  for GIB. While undergoing medical management/treatment for cause of GIB the patient developed SIRS on - of WBC:13.4 HR 8= RR 33 temperature 101.1. At this time, the patient's urine culture came back positive with  E. coli. The patient also became hypotensive and was in need of vasopressor IVF support of Phenylephrine gtt from -3/2. Patient is also receiving Merrem 500 mg IV Q 6 hrs. One 500 ML NS IVF bolus provided on 3/1, however, due to acute on chronic systolic CHF not much fluid resuscitation could be provided. Treatment: CBC/BMP daily, UA/UC, BC, higher level of care, frequent monitoring/vital signs, Merrem 500 mg IV Q 6 hrs, Phenylephrine IV gtt from -3/2 and one time dose  ML IVF bolus. Thank you, Tony Guardado [address] [place], [place] [address]Josiah Slater Options provided: 
-- Sepsis, not present on admission 
-- No Sepsis, UTI only 
-- Sepsis was ruled out 
-- Other - I will add my own diagnosis -- Disagree - Not applicable / Not valid -- Disagree - Clinically unable to determine / Unknown 
-- Refer to Clinical Documentation Reviewer PROVIDER RESPONSE TEXT: 
 
This patient has sepsis that was not present on admission. Query created by:  Iman Mary Ledesma on 3/3/2021 11:48 AM 
 
 
QUERY TEXT: 
 
Dear [facility] Team, 
Pt admitted with GIB. Pt noted to have hypotension on 2/27-3/2 requiring a Phenylephrine IV titratable gtt and higher level of care. If possible, please document in the progress notes and discharge summary if you are evaluating and/or treating any of the following: The medical record reflects the following: 
 
Risk Factors: 70 Yr F admitted with GIB Clinical Indicators: Patient admitted on 2/22 for GIB. While undergoing medical management/treatment for cause of GIB the patient developed SIRS on 2/27-2/28 of WBC:13.4 HR 8= RR 33 temperature 101.1. At this time, the patient's urine culture came back positive with  E. coli. The patient also became hypotensive and was in need of vasopressor IVF support of Phenylephrine gtt from 2/27-3/2. Patient also received a one time dose of 500 ML NS IVF bolus provided on 3/1, however, due to acute on chronic systolic CHF not much fluid resuscitation could be provided. Treatment: Higher level of care, frequent monitoring/vital signs, Phenylephrine IV gtt from 2/27-3/2 and one time dose  ML IVF bolus. Thank you, Nitza Gibbs [facility] Options provided: 
-- Cardiogenic Shock -- Hemorrhagic Shock 
-- Septic Shock -- Hypovolemic Shock -- Hypovolemia without Shock -- Hypotension without Shock 
-- Other - I will add my own diagnosis -- Disagree - Not applicable / Not valid -- Disagree - Clinically unable to determine / Unknown 
-- Refer to Clinical Documentation Reviewer PROVIDER RESPONSE TEXT: 
 
This patient has Hemorrhagic Shock. Query created by:  Rolando Quigley on 3/3/2021 11:55 AM 
 
 
Electronically signed by:  Eagle Holbrook MD 3/11/2021 5:07 AM

## 2021-03-14 PROCEDURE — 0651 HSPC ROUTINE HOME CARE

## 2021-08-05 NOTE — ED TRIAGE NOTES
Patient arrived to ED from home via EMS. Reports that was trying to get to the kitchen, felt dizzy and fell. Reports heating her head on the floor. Stated that lower back is hurting 7/10. Patient has a Hx of COPD and uses O2 3L at baseline. Reports chest pain prior to fall with nauea. INFECTIOUS DISEASE FOLLOW UP NOTE:    Interval History/ROS: Patient is a 67y old  Male who presents with a chief complaint of Anemia, Supratherapeutic INR, Dark Stools (05 Aug 2021 08:22)      Overnight events:    REVIEW OF SYSTEMS:  CONSTITUTIONAL: No unintentional weight loss; no weakness; no fevers or chills  HEENT: No decrease hearing, tinnitus, ear pain, rhinorrhea, congestion, or sore throat  RESPIRATORY: No cough, wheezing, hemoptysis; no shortness of breath/shortness of breath on exertion; no orthopnea  CARDIOVASCULAR: No chest pain or palpitations  GASTROINTESTINAL: No abdominal or epigastric pain; no change in appetite; no early satiety; no nausea, vomiting, or hematemesis; no diarrhea or constipation; no melena or hematochezia; no change of stool caliber  GENITOURINARY: No dysuria, increased in urinary frequency or hematuria; no urethral discharge  NEUROLOGICAL: No headache/dizziness; no focal numbness or motor weakness  MSK: No joint pain, erythema, or swelling; no back pain  SKIN: No itching, rashes; no recent insect bites  All other ROS negative except noted above    Prior hospital charts reviewed [Yes]  Primary team notes reviewed [Yes]  Other consultant notes reviewed [Yes]    Allergies:  No Known Allergies      ANTIMICROBIALS:   metroNIDAZOLE  IVPB 500 every 8 hours  vancomycin    Solution 125 every 12 hours      OTHER MEDS: MEDICATIONS  (STANDING):  acetaminophen    Suspension .. 650 every 6 hours PRN  aMIOdarone    Tablet 200 daily  clonazePAM  Tablet 0.5 at bedtime  heparin   Injectable 5000 every 8 hours  insulin lispro (ADMELOG) corrective regimen sliding scale  every 4 hours  metoclopramide Injectable 10 every 8 hours  octreotide  Injectable 50 every 8 hours  ondansetron Injectable 4 every 6 hours PRN  pantoprazole  Injectable 40 every 12 hours  polyethylene glycol 3350 17 daily  senna 2 at bedtime      Vital Signs Last 24 Hrs  T(F): 99.1 (08-05-21 @ 04:00), Max: 100.8 (07-30-21 @ 20:00)    Vital Signs Last 24 Hrs  HR: 106 (08-05-21 @ 11:00) (58 - 108)  BP: --  RR: 26 (08-05-21 @ 11:00)  SpO2: 100% (08-05-21 @ 11:00) (91% - 100%)  Wt(kg): --    EXAM:  GENERAL: NAD, lying in bed comfortably  HEAD: Atraumatic, Normocephalic  EYES: EOMI, PERRLA, conjunctiva pink and cornea white  ENT: Normal external ears and nose, no discharges; moist mucous membranes, no erythema on posterior oropharynx  NECK: Supple, nontender to palpation; no JVD  CHEST/LUNG: Clear to auscultation bilaterally; No rales, rhonchi, wheezing, or rubs. Unlabored respirations  HEART: Regular rate and rhythm; No murmurs, rubs, or gallops  ABDOMEN: Soft, nontender, nondistended; no rebound tenderness, no guarding; no hepatomegaly; normoactive/hyperactive/hypoactive bowel sounds  EXTREMITIES:  2+ Peripheral Pulses, brisk capillary refill. No clubbing, cyanosis, or petal edema  NERVOUS SYSTEM: Alert and oriented to person, time, place and situation, speech clear. No focal deficits   MSK: FROM all 4 extremities, full and equal strength; no joint erythema, swelling or pain  SKIN: No rashes or lesions, no superficial thrombophlebitis    Labs:                        8.1    10.66 )-----------( 228      ( 05 Aug 2021 00:53 )             27.0     08-05    134<L>  |  99  |  5<L>  ----------------------------<  116<H>  3.8   |  24  |  0.55    Ca    8.8      05 Aug 2021 00:53  Phos  2.9     08-05  Mg     1.8     08-05    TPro  6.6  /  Alb  2.7<L>  /  TBili  0.5  /  DBili  x   /  AST  25  /  ALT  30  /  AlkPhos  151<H>  08-05      WBC Trend:  WBC Count: 10.66 (08-05-21 @ 00:53)  WBC Count: 9.81 (08-04-21 @ 00:10)  WBC Count: 10.92 (08-03-21 @ 01:03)  WBC Count: 10.43 (08-02-21 @ 00:41)      Creatine Trend:  Creatinine, Serum: 0.55 (08-05)  Creatinine, Serum: 0.56 (08-04)  Creatinine, Serum: 0.56 (08-03)  Creatinine, Serum: 0.52 (08-02)      Liver Biochemical Testing Trend:  Alanine Aminotransferase (ALT/SGPT): 30 (08-05)  Alanine Aminotransferase (ALT/SGPT): 40 (08-04)  Alanine Aminotransferase (ALT/SGPT): 47 *H* (08-03)  Alanine Aminotransferase (ALT/SGPT): 60 *H* (08-02)  Alanine Aminotransferase (ALT/SGPT): 93 *H* (08-01)  Aspartate Aminotransferase (AST/SGOT): 25 (08-05-21 @ 00:53)  Aspartate Aminotransferase (AST/SGOT): 25 (08-04-21 @ 00:10)  Aspartate Aminotransferase (AST/SGOT): 17 (08-03-21 @ 01:01)  Aspartate Aminotransferase (AST/SGOT): 15 (08-02-21 @ 00:41)  Aspartate Aminotransferase (AST/SGOT): 21 (08-01-21 @ 00:32)  Bilirubin Total, Serum: 0.5 (08-05)  Bilirubin Total, Serum: 0.6 (08-04)  Bilirubin Total, Serum: 0.5 (08-03)  Bilirubin Total, Serum: 0.5 (08-02)  Bilirubin Total, Serum: 0.7 (08-01)      Trend LDH  08-05-21 @ 00:53  233  08-04-21 @ 00:10  221  08-03-21 @ 01:01  217  08-02-21 @ 00:41  241  08-01-21 @ 00:32  256<H>          MICROBIOLOGY:    MRSA PCR Result.: [facility] (06-14-21 @ 23:02)      Culture - Sputum (collected 01 Aug 2021 17:37)  Source: .Sputum Sputum  Final Report:    Moderate Stenotrophomonas maltophilia    Normal Respiratory Bria absent  Organism: Stenotrophomonas maltophilia  Organism: Stenotrophomonas maltophilia    Sensitivities:      -  Ceftazidime: S 4      -  Levofloxacin: S <=0.5      -  Trimethoprim/Sulfamethoxazole: S <=0.5/9.5      Method Type: CLAU    Culture - Blood (collected 01 Aug 2021 17:14)  Source: .Blood Blood-Peripheral  Preliminary Report:    No growth to date.    Culture - Blood (collected 01 Aug 2021 17:14)  Source: .Blood Blood-Peripheral  Preliminary Report:    No growth to date.    Culture - Blood (collected 31 Jul 2021 20:18)  Source: .Blood Blood  Preliminary Report:    No growth to date.    Culture - Blood (collected 31 Jul 2021 20:18)  Source: .Blood Blood  Preliminary Report:    No growth to date.    Culture - Body Fluid with Gram Stain (collected 30 Jul 2021 12:00)  Source: .Body Fluid Bile Fluid  Final Report:    No growth at 5 days    Culture - Blood (collected 29 Jul 2021 03:37)  Source: .Blood Blood-Peripheral  Final Report:    No Growth Final    Culture - Blood (collected 28 Jul 2021 10:19)  Source: .Blood Blood-Venous  Final Report:    No Growth Final    Culture - Blood (collected 28 Jul 2021 10:19)  Source: .Blood Blood-Peripheral  Final Report:    No Growth Final    Culture - Blood (collected 27 Jul 2021 19:10)  Source: .Blood Blood  Final Report:    No Growth Final      HIV-1/2 Combo Result: Nonreact (01-14-21 @ 08:18)    ABS CD4: 129 /uL (04-07-20 @ 08:38)                    COVID-19 PCR: NotDetec (06-14-21 @ 12:24)    COVID-19 [place] Domain AB Interp: Positive (06-15-21 @ 10:15)  COVID-19 IgG Antibody Interpretation: Positive (01-14-21 @ 08:55)  COVID-19 IgG Antibody Interpretation: Positive (09-08-20 @ 08:50)            Lactate Dehydrogenase, Serum: 233 (08-05)  Lactate Dehydrogenase, Serum: 221 (08-04)  Lactate Dehydrogenase, Serum: 217 (08-03)  Lactate Dehydrogenase, Serum: 241 (08-02)  Lactate Dehydrogenase, Serum: 256 (08-01)  Lactate Dehydrogenase, Serum: 295 (07-31)  Lactate Dehydrogenase, Serum: 281 (07-30)        Blood Gas Arterial, Lactate: 0.5 (08-05 @ 00:50)  Blood Gas Arterial, Lactate: 0.5 (08-04 @ 11:16)  Blood Gas Arterial, Lactate: 0.5 (08-04 @ 00:00)  Blood Gas Arterial, Lactate: 0.4 (08-03 @ 13:20)      RADIOLOGY:  imaging below personally reviewed   INFECTIOUS DISEASE FOLLOW UP NOTE:    Interval History/ROS: Patient is a 67y old  Male who presents with a chief complaint of Anemia, Supratherapeutic INR, Dark Stools (05 Aug 2021 08:22)    Overnight events: Patient remains afebrile and hemodynamically stable. No need for pressors. He is more awake and alert, able to communicate with team. Will try PT today.      REVIEW OF SYSTEMS:  CONSTITUTIONAL: No fevers or chills  HEENT: No sore throat  RESPIRATORY: No cough, wheezing, hemoptysis; no shortness of breath  CARDIOVASCULAR: No chest pain or palpitations  GASTROINTESTINAL: Reports abdominal pain; no change in appetite; no early satiety; no nausea, vomiting, or hematemesis; no diarrhea or constipation; no melena or hematochezia; no change of stool caliber  GENITOURINARY: No dysuria, increased in urinary frequency or hematuria  NEUROLOGICAL: Generalized weakness,  No headache/dizziness  MSK: No joint pain, erythema, or swelling; no back pain  SKIN: No itching, rashes; no recent insect bites  All other ROS negative except noted above    Prior hospital charts reviewed [Yes]  Primary team notes reviewed [Yes]  Other consultant notes reviewed [Yes]    Allergies:  No Known Allergies      ANTIMICROBIALS:   metroNIDAZOLE  IVPB 500 every 8 hours  vancomycin    Solution 125 every 12 hours      OTHER MEDS: MEDICATIONS  (STANDING):  acetaminophen    Suspension .. 650 every 6 hours PRN  aMIOdarone    Tablet 200 daily  clonazePAM  Tablet 0.5 at bedtime  heparin   Injectable 5000 every 8 hours  insulin lispro (ADMELOG) corrective regimen sliding scale  every 4 hours  metoclopramide Injectable 10 every 8 hours  octreotide  Injectable 50 every 8 hours  ondansetron Injectable 4 every 6 hours PRN  pantoprazole  Injectable 40 every 12 hours  polyethylene glycol 3350 17 daily  senna 2 at bedtime      Vital Signs Last 24 Hrs  T(F): 99.1 (08-05-21 @ 04:00), Max: 100.8 (07-30-21 @ 20:00)    Vital Signs Last 24 Hrs  HR: 106 (08-05-21 @ 11:00) (58 - 108)  BP: --  RR: 26 (08-05-21 @ 11:00)  SpO2: 100% (08-05-21 @ 11:00) (91% - 100%)  Wt(kg): --    EXAM:  GENERAL: NAD, lying in bed comfortably, Trach collar  HEAD: Atraumatic, Normocephalic  EYES: EOMI, PERRLA, conjunctiva pink and cornea white  ENT: Normal external ears and nose, no discharges; moist mucous membranes, no erythema on posterior oropharynx; no oral thrush  NECK: Supple, nontender to palpation; no JVD; on trach collar, has copious amount of secretion  CHEST/LUNG: Bilateral rhonchi  HEART: Regular rate and rhythm; LVAD motor is audible  ABDOMEN: Soft, nondistended but diffusely tender to palpation; no rebound tenderness, no guarding; no hepatomegaly; normoactive bowel sounds; drive line dressing clean and intact  EXTREMITIES:  2+ Peripheral Pulses, brisk capillary refill. No clubbing, cyanosis, or petal edema  NERVOUS SYSTEM: Alert and oriented to person, time, place and situation. unable to speak but can mouth words, No focal deficits   MSK: move all four extremities spontaneously, no joint erythema, swelling or pain  SKIN: No rashes or lesions, no superficial thrombophlebitis    Labs:                        8.1    10.66 )-----------( 228      ( 05 Aug 2021 00:53 )             27.0     08-05    134<L>  |  99  |  5<L>  ----------------------------<  116<H>  3.8   |  24  |  0.55    Ca    8.8      05 Aug 2021 00:53  Phos  2.9     08-05  Mg     1.8     08-05    TPro  6.6  /  Alb  2.7<L>  /  TBili  0.5  /  DBili  x   /  AST  25  /  ALT  30  /  AlkPhos  151<H>  08-05      WBC Trend:  WBC Count: 10.66 (08-05-21 @ 00:53)  WBC Count: 9.81 (08-04-21 @ 00:10)  WBC Count: 10.92 (08-03-21 @ 01:03)  WBC Count: 10.43 (08-02-21 @ 00:41)      Creatine Trend:  Creatinine, Serum: 0.55 (08-05)  Creatinine, Serum: 0.56 (08-04)  Creatinine, Serum: 0.56 (08-03)  Creatinine, Serum: 0.52 (08-02)      Liver Biochemical Testing Trend:  Alanine Aminotransferase (ALT/SGPT): 30 (08-05)  Alanine Aminotransferase (ALT/SGPT): 40 (08-04)  Alanine Aminotransferase (ALT/SGPT): 47 *H* (08-03)  Alanine Aminotransferase (ALT/SGPT): 60 *H* (08-02)  Alanine Aminotransferase (ALT/SGPT): 93 *H* (08-01)  Aspartate Aminotransferase (AST/SGOT): 25 (08-05-21 @ 00:53)  Aspartate Aminotransferase (AST/SGOT): 25 (08-04-21 @ 00:10)  Aspartate Aminotransferase (AST/SGOT): 17 (08-03-21 @ 01:01)  Aspartate Aminotransferase (AST/SGOT): 15 (08-02-21 @ 00:41)  Aspartate Aminotransferase (AST/SGOT): 21 (08-01-21 @ 00:32)  Bilirubin Total, Serum: 0.5 (08-05)  Bilirubin Total, Serum: 0.6 (08-04)  Bilirubin Total, Serum: 0.5 (08-03)  Bilirubin Total, Serum: 0.5 (08-02)  Bilirubin Total, Serum: 0.7 (08-01)      Trend LDH  08-05-21 @ 00:53  233  08-04-21 @ 00:10  221  08-03-21 @ 01:01  217  08-02-21 @ 00:41  241  08-01-21 @ 00:32  256<H>          MICROBIOLOGY:    MRSA PCR [facility].: [facility] (06-14-21 @ 23:02)      Culture - Sputum (collected 01 Aug 2021 17:37)  Source: .Sputum Sputum  Final Report:    Moderate Stenotrophomonas maltophilia    Normal Respiratory Bria absent  Organism: Stenotrophomonas maltophilia  Organism: Stenotrophomonas maltophilia    Sensitivities:      -  Ceftazidime: S 4      -  Levofloxacin: S <=0.5      -  Trimethoprim/Sulfamethoxazole: S <=0.5/9.5      Method Type: CLAU    Culture - Blood (collected 01 Aug 2021 17:14)  Source: .Blood Blood-Peripheral  Preliminary Report:    No growth to date.    Culture - Blood (collected 01 Aug 2021 17:14)  Source: .Blood Blood-Peripheral  Preliminary Report:    No growth to date.    Culture - Blood (collected 31 Jul 2021 20:18)  Source: .Blood Blood  Preliminary Report:    No growth to date.    Culture - Blood (collected 31 Jul 2021 20:18)  Source: .Blood Blood  Preliminary Report:    No growth to date.    Culture - Body Fluid with Gram Stain (collected 30 Jul 2021 12:00)  Source: .Body Fluid Bile Fluid  Final Report:    No growth at 5 days    Culture - Blood (collected 29 Jul 2021 03:37)  Source: .Blood Blood-Peripheral  Final Report:    No Growth Final    Culture - Blood (collected 28 Jul 2021 10:19)  Source: .Blood Blood-Venous  Final Report:    No Growth Final    Culture - Blood (collected 28 Jul 2021 10:19)  Source: .Blood Blood-Peripheral  Final Report:    No Growth Final    Culture - Blood (collected 27 Jul 2021 19:10)  Source: .Blood Blood  Final Report:    No Growth Final      HIV-1/2 Combo Result: Nonreact (01-14-21 @ 08:18)    ABS CD4: 129 /uL (04-07-20 @ 08:38)                    COVID-19 PCR: NotDetec (06-14-21 @ 12:24)    COVID-19 [place] Domain AB Interp: Positive (06-15-21 @ 10:15)  COVID-19 IgG Antibody Interpretation: Positive (01-14-21 @ 08:55)  COVID-19 IgG Antibody Interpretation: Positive (09-08-20 @ 08:50)            Lactate Dehydrogenase, Serum: 233 (08-05)  Lactate Dehydrogenase, Serum: 221 (08-04)  Lactate Dehydrogenase, Serum: 217 (08-03)  Lactate Dehydrogenase, Serum: 241 (08-02)  Lactate Dehydrogenase, Serum: 256 (08-01)  Lactate Dehydrogenase, Serum: 295 (07-31)  Lactate Dehydrogenase, Serum: 281 (07-30)        Blood Gas Arterial, Lactate: 0.5 (08-05 @ 00:50)  Blood Gas Arterial, Lactate: 0.5 (08-04 @ 11:16)  Blood Gas Arterial, Lactate: 0.5 (08-04 @ 00:00)  Blood Gas Arterial, Lactate: 0.4 (08-03 @ 13:20)      RADIOLOGY:  imaging below personally reviewed

## 2021-11-30 NOTE — ROUTINE PROCESS
Bedside and Verbal shift change report given to Kristin (oncoming nurse) by Saba Kemp (offgoing nurse). Report included the following information SBAR, Kardex, Intake/Output, MAR, Accordion, Recent Results, Quality Measures and Dual Neuro Assessment. Left message for patient to call the office. My office hours were provided.      done

## 2023-02-27 NOTE — PROGRESS NOTES
Problem: Mobility Impaired (Adult and Pediatric) Goal: *Acute Goals and Plan of Care (Insert Text) Description: FUNCTIONAL STATUS PRIOR TO ADMISSION: Patient was modified independent using a rolling walker for functional mobility. HOME SUPPORT PRIOR TO ADMISSION: The patient lived with daughter and grand-daughter but did not require assist. 
 
Physical Therapy Goals Initiated 1/5/2021 1. Patient will move from supine to sit and sit to supine  in bed with moderate assistance  within 7 day(s). 2.  Patient will transfer from bed to chair and chair to bed with moderate assistance  using the least restrictive device within 7 day(s). 3.  Patient will perform sit to stand with moderate assistance  within 7 day(s). 4.  Patient will ambulate with moderate assistance  for 50 feet with the least restrictive device within 7 day(s). Outcome: Progressing Towards Goal 
Note: PHYSICAL THERAPY TREATMENT Patient: Tanya Mendoza (59 y.o. female) Date: 1/11/2021 Diagnosis: Left lower lobe pneumonia [J18.9] <principal problem not specified> Procedure(s) (LRB): ESOPHAGOGASTRODUODENOSCOPY (EGD) (N/A) COLONOSCOPY (N/A) ESOPHAGOGASTRODUODENAL (EGD) BIOPSY (N/A) COLON BIOPSY (N/A) ENDOSCOPIC POLYPECTOMY (N/A) 7 Days Post-Op Precautions: Fall, WBAT Chart, physical therapy assessment, plan of care and goals were reviewed. ASSESSMENT Patient continues with skilled PT services and slowly progressing towards goals. Unable to transfer to [facility] d/t urgency. Total A for bowel hygiene. up to max A for bed mobility and use of momentum to come to standing with Mod A x 2 fatigues quickly and agreeable only to side step toward [facility], declined to transfer to chair. Increased dyspnea with activity O2 sat 95% using 3L throughout session. Current Level of Function Impacting Discharge (mobility/balance): assist x 2 for safety Other factors to consider for discharge: PLAN : 
 no edema,  no murmurs,  regular rate and rhythm Patient continues to benefit from skilled intervention to address the above impairments. Continue treatment per established plan of care. to address goals. Recommendation for discharge: (in order for the patient to meet his/her long term goals) Therapy up to 5 days/week in SNF setting This discharge recommendation: 
Has been made in collaboration with the attending provider and/or case management IF patient discharges home will need the following DME: to be determined (TBD) SUBJECTIVE:  
Patient stated Brigitte Baron tried the best I could.  OBJECTIVE DATA SUMMARY:  
Critical Behavior: 
Neurologic State: Alert Orientation Level: Oriented to person, Oriented to place, Oriented to situation Cognition: Follows commands Functional Mobility Training: 
Bed Mobility: 
Rolling: Maximum assistance Supine to Sit: Maximum assistance Scooting: Maximum assistance;Assist x1 Transfers: 
Sit to Stand: Moderate assistance;Assist x2; Additional time Stand to Sit: Moderate assistance Balance: 
Sitting: Intact Standing: Impaired; With support Standing - Static: Constant support; Fair 
Standing - Dynamic : Fair Ambulation/Gait Training: 
Distance (ft): 3 Feet (ft)(side stepping toward HOB) Assistive Device: Walker, rolling;Gait belt Ambulation - Level of Assistance: Minimal assistance;Assist x2; Additional time Base of Support: Widened Stairs: Therapeutic Exercises:  
 
Pain Rating: 
Denies pain Activity Tolerance:  
Fair and observed SOB with activity After treatment patient left in no apparent distress:  
Supine in bed, Call bell within reach, and Bed / chair alarm activated COMMUNICATION/COLLABORATION:  
The patients plan of care was discussed with: Tamara Isaacs Credit Time Calculation: 33 mins

## 2023-03-23 NOTE — PROGRESS NOTES
Daily Progress Note: 11/5/2020 Pedrito Damon MD 
 
Assessment/Plan:  
Cellulitis - reported in the LLE. Does have a very small wound over the left patellar region but not exquisitely warm or tender to touch. No expressible pus. CT above without abscess  
- f/u cultures- 1/4 bottles with gram + cocci- likely contaminant 
- continue IV antibiotics - will ask ortho to eval- they have signed off - ID consulted- antibiotic changed to Rocephin 
  
CAD (coronary artery disease) (10/9/2015) - not on ASA, statin 
- continue metoprolol  
  
Type II diabetes mellitus ([facility] (10/9/2015) - ISS  
- BG checks AC TID and qHS  
- resume home meds at discharge  
  
Essential hypertension (1/22/2016) - continue metoprolol 
  
Recurrent deep vein thrombosis (DVT) ([facility] (3/30/2018) - on Eliquis  
  
Chronic anticoagulation (3/30/2018) - Eliquis for a-fib and h/o DVT 
- BLE dopplers + DVT  
  
Sleep apnea (1/5/2019) - CPAP qHS  
  
Atrial fibrillation ([facility] (11/16/2018) - on digoxin and metoprolol  
- continue eliquis  
  
COPD (chronic obstructive pulmonary disease) ([facility] (7/13/2019) - chronically on O2  
- continue nebs  
  
PAD (peripheral artery disease) ([facility] (7/13/2019)/PVD 
  
Severe obesity ([facility] (7/30/2019) - counseled on weight loss  
  
Chronic respiratory failure with hypoxia ([facility] (11/2/2020) 
- on 3L Disp 
- Back to SNF- repeat COVID today and CXR Problem List: 
Problem List as of 11/5/2020 Date Reviewed: 11/2/2020 Codes Class Noted - Resolved Leukocytosis ICD-10-CM: N18.323 ICD-9-CM: 288.60  11/3/2020 - Present Chronic respiratory failure with hypoxia [facility]) ICD-10-CM: J96.11 
ICD-9-CM: 518.83, 799.02  11/2/2020 - Present Overview Signed 11/2/2020 10:12 PM by Bang Mayen MD  
  On 3L NC at home Cellulitis of lower extremity ICD-10-CM: L03.119 ICD-9-CM: 682.6  3/23/2020 - Present Pt's daughter called to inform us that Dr. Sherly Delcid has decided pt needs a pacemaker. It will be installed on Mon 3/27/23.  If we have any questions please call Hussein Armendariz at [phone] ASO (arteriosclerosis obliterans) ICD-10-CM: I70.90 ICD-9-CM: 440.9  9/5/2019 - Present PVD (peripheral vascular disease) ([facility] ICD-10-CM: I73.9 ICD-9-CM: 443.9  8/1/2019 - Present Severe obesity ([facility] ICD-10-CM: E66.01 
ICD-9-CM: 278.01  7/30/2019 - Present COPD (chronic obstructive pulmonary disease) (HCC) ICD-10-CM: J44.9 ICD-9-CM: 722  7/13/2019 - Present Normocytic anemia ICD-10-CM: D64.9 ICD-9-CM: 285.9  7/13/2019 - Present PAD (peripheral artery disease) (HCC) ICD-10-CM: I73.9 ICD-9-CM: 443.9  7/13/2019 - Present Mild intermittent asthma ICD-10-CM: J45.20 ICD-9-CM: 493.90  5/17/2019 - Present Brachial artery thrombus (HCC) ICD-10-CM: Y86.5 ICD-9-CM: 444.21  4/26/2019 - Present Sleep apnea ICD-10-CM: G47.30 ICD-9-CM: 780.57  1/5/2019 - Present Overview Signed 1/5/2019  8:41 PM by Lakshmi Joyner DO  
  wears CPAP Atrial fibrillation [facility]) ICD-10-CM: I48.91 
ICD-9-CM: 427.31  11/16/2018 - Present Obesity (BMI 30-39.9) (Chronic) ICD-10-CM: B61.8 ICD-9-CM: 278.00  11/13/2018 - Present Recurrent deep vein thrombosis (DVT) (HCC) ICD-10-CM: I82.409 ICD-9-CM: 453.40  3/30/2018 - Present Chronic anticoagulation (Chronic) ICD-10-CM: Z79.01 
ICD-9-CM: V58.61  3/30/2018 - Present Essential hypertension (Chronic) ICD-10-CM: I10 
ICD-9-CM: 401.9  1/22/2016 - Present CAD (coronary artery disease) ICD-10-CM: I25.10 ICD-9-CM: 414.00  10/9/2015 - Present Type II diabetes mellitus (HCC) (Chronic) ICD-10-CM: E11.9 ICD-9-CM: 250.00  10/9/2015 - Present RESOLVED: Syncope and collapse ICD-10-CM: R55 
ICD-9-CM: 780.2  9/29/2020 - 11/2/2020 RESOLVED: Cellulitis ICD-10-CM: L03.90 ICD-9-CM: 682.9  3/23/2020 - 5/29/2020 RESOLVED: Hypokalemia ICD-10-CM: E87.6 ICD-9-CM: 276.8  3/23/2020 - 5/29/2020 RESOLVED: Hyponatremia ICD-10-CM: E87.1 ICD-9-CM: 276.1  3/23/2020 - 5/29/2020 RESOLVED: Diarrhea ICD-10-CM: R19.7 ICD-9-CM: 787.91  3/23/2020 - 5/29/2020 RESOLVED: Syncope and collapse ICD-10-CM: R55 
ICD-9-CM: 780.2  7/13/2019 - 5/29/2020 RESOLVED: UTI (urinary tract infection) ICD-10-CM: N39.0 ICD-9-CM: 599.0  7/13/2019 - 11/2/2020 * (Principal) RESOLVED: Sepsis ([facility] ICD-10-CM: A41.9 ICD-9-CM: 038.9, 995.91  7/13/2019 - 11/3/2020 RESOLVED: Leg wound, left ICD-10-CM: O78.407Y ICD-9-CM: 891.0  7/13/2019 - 5/29/2020 RESOLVED: Leg laceration, right, initial encounter ICD-10-CM: R26.439E ICD-9-CM: 894.0  7/13/2019 - 5/29/2020 RESOLVED: Cellulitis of right upper arm ICD-10-CM: L03.113 ICD-9-CM: 682.3  5/17/2019 - 5/29/2020 RESOLVED: Gangrene of finger of right hand ([facility] ICD-10-CM: T60 
ICD-9-CM: 785.4  5/17/2019 - 11/2/2020 RESOLVED: Bowel obstruction ([facility] ICD-10-CM: Y65.319 ICD-9-CM: 560.9  1/8/2019 - 5/29/2020 RESOLVED: Partial small bowel obstruction ([facility] ICD-10-CM: K56.600 ICD-9-CM: 560.9  1/5/2019 - 5/29/2020 RESOLVED: Dyspnea ICD-10-CM: R06.00 
ICD-9-CM: 786.09  11/13/2018 - 5/29/2020 RESOLVED: ARF (acute renal failure) (HCC) ICD-10-CM: N17.9 ICD-9-CM: 584.9  11/13/2018 - 5/29/2020 RESOLVED: Closed wedge compression fracture of T7 vertebra ([facility] ICD-10-CM: Y17.960K ICD-9-CM: 805.2  11/13/2018 - 5/29/2020 RESOLVED: Type 2 diabetes with nephropathy ([facility] ICD-10-CM: E11.21 
ICD-9-CM: 250.40, 583.81  10/1/2018 - 11/2/2020 RESOLVED: Chest pain ICD-10-CM: R07.9 ICD-9-CM: 786.50  6/27/2018 - 5/29/2020 RESOLVED: Abdominal pain ICD-10-CM: R10.9 ICD-9-CM: 789.00  6/27/2018 - 5/29/2020 RESOLVED: Coronary artery disease involving native coronary artery without angina pectoris (Chronic) ICD-10-CM: I25.10 ICD-9-CM: 414.01  1/22/2016 - 11/2/2020 RESOLVED: HTN (hypertension) ICD-10-CM: I10 
ICD-9-CM: 401.9  10/9/2015 - 1/22/2016 RESOLVED: NSTEMI (non-ST elevated myocardial infarction) ([facility] ICD-10-CM: I21.4 ICD-9-CM: 410.70  10/9/2015 - 2020 HPI:  
Ms. Catrina Maldonado is a 69 y.o.  female with PMH of a-fib, CAD, DM, asthma, h/p DVT admitted for LLE \"cellulitis\". Per pt was sent here by her wound care doctor for further evaluation. Denies fevers/chills. No N/V. Has noted some drainage from her L knee wound. Does to have hardware in her L leg  (Dr Lima) 
 
11/3: Has been at [facility] in the [facility] for the last 2 weeks. Sent to the ER as she was having more drainage from her knee. No other complaints. : Ortho consult completed and do not feel joint is septic. Will ask ID to see. Cultures neg so far but was on antibiotics prior to admission. She has no complaints at this time. Dopplers not done yet so will reorder. : BC  bottles with gram + cocci- likely contaminant. Afebrile. ID has seen and antibiotic changed to Rocephin. COVID neg and will repeat today as she needs  2 neg results. Dopplers + DVT. On Eliquis. Review of Systems: A comprehensive review of systems was negative except for that written in the HPI. Objective:  
Physical Exam:  
 
Visit Vitals BP (!) 156/77 (BP 1 Location: Right arm, BP Patient Position: At rest) Pulse 94 Temp 97.6 °F (36.4 °C) Resp 16 Wt 255 lb (115.7 kg) SpO2 100% BMI 39.94 kg/m² O2 Flow Rate (L/min): 3 l/min O2 Device: Nasal cannula Temp (24hrs), Av °F (36.7 °C), Min:97.6 °F (36.4 °C), Max:98.4 °F (36.9 °C) 1901 -  0700 In: 30 [I.V.:30] Out: -     07 -  1900 In: 2207.5 [P.O.:1560; I.V.:647.5] Out: - General:  Alert, cooperative, no distress Obese Head:  Normocephalic, without obvious abnormality, atraumatic. Eyes:  Conjunctivae/corneas clear. PERRL, EOMs intact. Nose: Nares normal. Septum midline. Mucosa normal. No drainage or sinus tenderness. Throat: Lips, mucosa, and tongue normal.   
Neck: Supple, symmetrical, trachea midline, no adenopathy, thyroid: no enlargement/tenderness/nodules, no carotid bruit and no JVD. Back:   Symmetric, no curvature. ROM normal. No CVA tenderness. Lungs:   Clear to auscultation bilaterally. Chest wall:  No tenderness or deformity. Heart:  Regular rate and rhythm, S1, S2 normal, no murmur, click, rub or gallop. Abdomen:   Soft, non-tender. Bowel sounds normal. No masses,  No organomegaly. Extremities: Extremities swollen and weeping, erythematous bilaterally, left knee with small amount of drainage present on bandage, both LE scaly and dry,  atraumatic, no cyanosis  No calf tenderness or cords. Pulses: 1+ and symmetric all extremities. Skin: Skin turgor normal.  
Neurologic: CNII-XII intact. Alert and oriented X 3. Fine motor of hands and fingers normal.   equal.  No cogwheeling or rigidity. Gait not tested at this time. Sensation grossly normal to touch. Gross motor of extremities normal.    
 
Data Review: CT LE 11/2/20 FINDINGS: The bones are mildly osteopenic. There is no fracture or dislocation 
or osseous attenuation abnormality to suggest osteomyelitis. 
  
There is no joint prosthesis demonstrated. There is no substantial knee or ankle 
arthrosis. No knee1 or ankle effusion is shown. 
  
The muscles are normal in size and attenuation. No compartmental mass or 
collection is shown. There are scattered atherosclerotic calcifications which 
are greater on the left especially at the proximal left popliteal artery. 
  
There is diffuse bilateral subcutaneous strand-like opacification with areas of 
subdermal confluence anterolaterally on the left at the level of the knee. Diffuse skin thickening is shown. 
  
IMPRESSION:  
Diffuse bilateral subcutaneous strand-like opacification which could be on 
inflammatory basis or related to chronic venous stasis and lymphedema. There are areas of subdermal confluence of nonspecific nature anteromedially at the level 
of the knee. There is no localized intramuscular collection, joint effusion, or 
CT imaging evidence for osteomyelitis. Lower Extremity Venous Findings Dopplers BLE 11/4/20 Right Lower Venous Technically difficult exam due to: patient body habitus and marked edema. Age indeterminate non-occlusive thrombus present in the right common femoral vein. The profunda femoral, proximal femoral, mid femoral, distal femoral, popliteal and saphenous femoral junction vein(s) were imaged in the transverse and longitudinal planes. The vessels showed normal color filling and compressibility. Doppler interrogation of the veins showed phasic and spontaneous flow. The posterior tibial and peroneal vein(s) were not well visualized. Left Lower Venous Technically difficult exam due to: patient body habitus and marked edema. The common femoral, saphenous femoral junction, proximal femoral, mid femoral and distal femoral vein(s) were imaged in the transverse and longitudinal planes. The vessels showed normal color filling and compressibility. Doppler interrogation of the veins showed phasic and spontaneous flow. The popliteal, posterior tibial and peroneal vein(s) were not well visualized. Recent Days: 
Recent Labs 11/02/20 
1626 WBC 13.1* HGB 10.0* HCT 34.4*  
 Recent Labs 11/03/20 
0509 11/02/20 
1626  143  
K 4.4 4.2 * 111* CO2 24 26 * 132* BUN 17 17 CREA 0.89 1.05* CA 7.9* 8.1* ALB  --  2.3* TBILI  --  0.5 ALT  --  50  
 
 
24 Hour Results: 
Recent Results (from the past 24 hour(s)) GLUCOSE, POC Collection Time: 11/04/20  7:13 AM  
Result Value Ref Range Glucose (POC) 117 (H) 65 - 100 mg/dL Performed by Ciro Segura POC Collection Time: 11/04/20 11:28 AM  
Result Value Ref Range Glucose (POC) 135 (H) 65 - 100 mg/dL Performed by Dario Sandhoff (CON) SARS-COV-2 Collection Time: 11/04/20  1:11 PM  
Result Value Ref Range Specimen source Nasopharyngeal    
 SARS-CoV-2 Not detected NOTD    
 SARS-CoV-2 PENDING Specimen source Nasopharyngeal    
 COVID-19 rapid test PENDING Specimen type NP Swab Health status Symptomatic Testing COVID-19 PENDING   
GLUCOSE, POC Collection Time: 11/04/20  3:59 PM  
Result Value Ref Range Glucose (POC) 179 (H) 65 - 100 mg/dL Performed by Chaka Guardado (PCT) GLUCOSE, POC Collection Time: 11/04/20  9:12 PM  
Result Value Ref Range Glucose (POC) 171 (H) 65 - 100 mg/dL Performed by Rajeev Hua Medications reviewed Current Facility-Administered Medications Medication Dose Route Frequency  cefTRIAXone (ROCEPHIN) 2 g in sterile water (preservative free) 20 mL IV syringe  2 g IntraVENous Q24H  
 sodium chloride (NS) flush 5-40 mL  5-40 mL IntraVENous Q8H  
 sodium chloride (NS) flush 5-40 mL  5-40 mL IntraVENous PRN  
 acetaminophen (TYLENOL) tablet 650 mg  650 mg Oral Q4H PRN  
 HYDROcodone-acetaminophen (NORCO) 5-325 mg per tablet 1 Tab  1 Tab Oral Q4H PRN  
 naloxone (NARCAN) injection 0.4 mg  0.4 mg IntraVENous PRN  
 ondansetron (ZOFRAN) injection 4 mg  4 mg IntraVENous Q4H PRN  
 glucose chewable tablet 16 g  4 Tab Oral PRN  
 dextrose (D50W) injection syrg 12.5-25 g  25-50 mL IntraVENous PRN  
 glucagon (GLUCAGEN) injection 1 mg  1 mg IntraMUSCular PRN  
 insulin lispro (HUMALOG) injection   SubCUTAneous AC&HS  
 apixaban (ELIQUIS) tablet 5 mg  5 mg Oral BID  cholecalciferol (VITAMIN D3) (1000 Units /25 mcg) tablet 2 Tab  2,000 Units Oral DAILY  cyanocobalamin (VITAMIN B12) tablet 1,000 mcg  1,000 mcg Oral DAILY  digoxin (LANOXIN) tablet 0.125 mg  0.125 mg Oral DAILY  DULoxetine (CYMBALTA) capsule 60 mg  60 mg Oral DAILY  ferrous sulfate tablet 325 mg  325 mg Oral DAILY WITH BREAKFAST  lactobac ac& pc-s.therm-b.anim (NUBIA Q/RISAQUAD)  1 Cap Oral DAILY  loperamide (IMODIUM) capsule 4 mg  4 mg Oral BID  magnesium oxide (MAG-OX) tablet 400 mg  400 mg Oral QHS  mirtazapine (REMERON) tablet 15 mg  15 mg Oral QHS  arformoterol 15 mcg/budesonide 0.5 mg neb solution   Nebulization BID RT  
 montelukast (SINGULAIR) tablet 10 mg  10 mg Oral QPM  
 pantoprazole (PROTONIX) tablet 40 mg  40 mg Oral ACB&D  potassium chloride SR (KLOR-CON 10) tablet 10 mEq  10 mEq Oral BID  predniSONE (DELTASONE) tablet 10 mg  10 mg Oral BID WITH MEALS  
 albuterol-ipratropium (DUO-NEB) 2.5 MG-0.5 MG/3 ML  3 mL Nebulization Q6H PRN  
 albuterol (ACCUNEB) nebulizer solution 1.25 mg  1.25 mg Nebulization Q2H PRN Care Plan discussed with: Patient/Family Total time spent with patient and review of records: 30 minutes.  
 
Sundra Babinski, MD

## 2024-07-29 NOTE — PROGRESS NOTES
2/5/2021   CARE MANAGEMENT NOTE:  CM reviewed EMR for clinical updates and handoff received from ER  Bigg ORNELAS.  Pt is a READMISSION. Erick Chong was admitted to [facility] from 1/1/21 - 1/15/21 with PNA, and acute respiratory failure.  She discharged to [address] [place] [address] Nw 1/26/21, pt was readmitted with PNA, CHF, anemia, and HTN.   
Reportedly, pt lived alone prior to admit to [address] [place] [address] for for SNF level of rehab.   
Dtr Lane Pierce ([phone]) is her primary family contact. 
  
RUR 52%; IVR 9 RMGK  
  
Transition Plan of Care: 1.  Plan is for pt to return to [facility] for continued rehab. Clinicals were faxed for review. 2.  Long term goal is to relocate into an assisted living facility following rehab 3.  COVID 19 test (two tests within 24 hrs) for readmit to [address] [place] [address] will be required. Please order test on Saturday as it is taking up to 48 hrs to result. 4.  Cardiac bundle pt 5.  Dtr to transport pt with portable oxygen tank vs [facility] at discharge 
  
CM will continue to follow pt for SNF placement. Jeanette Patient requesting refill on Rx - Anusol -HC 2.5% cream be called to her [facility] pharmacy listed in chart.

## 2024-10-07 NOTE — PROGRESS NOTES
Monitor tech called and stated that the heart rate was up. Patient was in A-fib with a rate in the 130s. /85. O2 sats 95%. Patient without any complaints at this time. Dr. Andrade Martinez notified. Diltiazem 5 mg IV ordered and given. 0350. Dr. Andrade Martinez again notified. Patient remains in A-fib but heart rate has decreased to the loww 100s at times. Patient remains asymptomatic. Dr. Andrade Martinez stated to call Crdiiologiy if the patieant remains in a-fib in the next 15 minutes. 0430. Patient remains in A-fib. Dr. Pato Easton contacted. Received orders to start patient on a diltizem infusion. clears

## 2025-02-11 NOTE — PROGRESS NOTES
2/23/2021 1:37 PM  
Reason for Readmission:   
1. Gastrointestinal hemorrhage, unspecified gastrointestinal hemorrhage type   
BCPI bundle letter reviewed with pt over the phone due to precautions and also provided to pt's RN to take into pt's room.  
 
Recently at [facility] from 1/1/21-1/15/21 for pneumonia   
[facility] also again from 1/26/21-2/09/21 for leukocytosis 
Both times pt discharged back to [facility] 
      
RUR Score/Risk Level:  49%/high risk for readmission     
 
PCP: First and Last name: Eduardo Bajwa  
 Name of Practice:  
 Are you a current patient: Yes/No: yes 
 Approximate date of last visit: unable to recall 
 Can you participate in a virtual visit with your PCP:  
 
Is a Care Conference indicated: Not indicated at this time 
 
 
Did you attend your follow up appointment (s):  If not, why not: 
Pt was admitted to SNF, was followed by MD at SNF.  
    
Resources/supports as identified by patient/family:   Supportive daughter 
    
Top Challenges facing patient (as identified by patient/family and CM):      
 
Finances/Medication cost? No concerns, covered by Medicare and AARP     
Transportation   No concerns, pt uses transport within [facility], reported she will need ambulance transport at discharge.    
Support system or lack thereof? Supportive family     
Living arrangements?  Pt from SNF at [facility], pt reported she plans to move to [facility] following SNF placement.         
Self-care/ADLs/Cognition?  Pt AxOx4. Pt needing assistance with adls.     
    
Current Advanced Directive/Advance Care Plan: Pt confirmed she has an AMD and her daughter,   
        
Plan for utilizing home health:  N/a, will be at SNF  
          
Transition of Care Plan:    Based on readmission, the patient's previous Plan of Care 
 has been evaluated and/or modified. The current Transition of Care Plan is: to return to SNF at [facility]. 
MYLES Wood 
 Patient: REJI HARDING    MR# [id number]    Updates sent to [facility] via [employer]. Pt will need 2 COVID tests to return to [facility]. Care Management Interventions PCP Verified by CM: Sharyn Pea) MyChart Signup: No 
Discharge Durable Medical Equipment: No 
Physical Therapy Consult: No 
Occupational Therapy Consult: No 
Speech Therapy Consult: No 
Current Support Network: [address] [place] [address] The Patient and/or Patient Representative was Provided with a Choice of Provider and Agrees with the Discharge Plan?: Yes Name of the Patient Representative Who was Provided with a Choice of Provider and Agrees with the Discharge Plan: Patient Freedom of Choice List was Provided with Basic Dialogue that Supports the Patient's Individualized Plan of Care/Goals, Treatment Preferences and Shares the Quality Data Associated with the Providers?: Yes Discharge Location Discharge Placement: Skilled nursing facility Readmission Assessment Number of days since last admission?: 8-30 days Previous disposition: SNF([facility]) Who is being interviewed?: Patient What was the patient's/caregiver's perception as to why they think they needed to return back to the hospital?: Other (Comment)(Low HGB) Did you visit your Primary Care Physician after you left the hospital, before you returned this time?: No 
Why weren't you able to visit your PCP?: Other (Comment)(Was at SNF, was seen by SNF MD) Did you see a specialist, such as Cardiac, Pulmonary, Orthopedic Physician, etc. after you left the hospital?: No 
Who advised the patient to return to the hospital?: Skilled Unit Does the patient report anything that got in the way of taking their medications?: No 
In our efforts to provide the best possible care to you and others like you, can you think of anything that we could have done to help you after you left the hospital the first time, so that you might not have needed to return so soon?: Other (Comment)(None, return to SNF at discharge) 1427

## 2025-05-03 NOTE — PROGRESS NOTES
Daily Progress Note: 1/5/2021 Yina Narayan MD 
 
Assessment/Plan:  
Left lower lobe pneumonia 
-Continue Rocephin and azithromycin 
-Covid negative 
-pulmonology following 
  
Acute hypoxic respiratory failure 
-Continue with supplemental oxygen 
-Patient uses 3 L of oxygen at baseline, wean to this goal as tolerated Afib with RVR, fluid overloaded 
-dilt drip changed to po per Cards 
 
-monitor mag/phos/K+ and replete PRN 
-Continue with digoxin and hold Eliquis until GI/Cards say 
-Consulted cardiology 
- lasix/diuretics per Cards 
  
Anemia - acute blood loss on chronic 
-Occult blood positive 
-Transfused 2 units PRBC so far -GI on board- endoscopies 1/4 
  
Sepsis 
-Continue treatment of above 
-Follow cultures 
  
Elevated creatinine 
-monitor, treat as needed 
  
Hypertension 
-Continue with metoprolol 
  
Type 2 diabetes: sugars soft. A1c 5.9 
-Patient takes Onglyza which we will hold 
-Continue sliding scale insulin  
-lantus halved to 10 
   
Anxiety/depression 
-Continue with Cymbalta and Remeron 
   
 
Problem List: 
Problem List as of 1/5/2021 Date Reviewed: 11/2/2020 Codes Class Noted - Resolved Left lower lobe pneumonia ICD-10-CM: J18.9 ICD-9-CM: 725  1/1/2021 - Present Leukocytosis ICD-10-CM: Z16.967 ICD-9-CM: 288.60  11/3/2020 - Present Chronic respiratory failure with hypoxia [facility]) ICD-10-CM: J96.11 
ICD-9-CM: 518.83, 799.02  11/2/2020 - Present Overview Signed 11/2/2020 10:12 PM by Kristian Arshad MD  
  On 3L NC at home Cellulitis of lower extremity ICD-10-CM: L03.119 ICD-9-CM: 682.6  3/23/2020 - Present ASO (arteriosclerosis obliterans) ICD-10-CM: I70.90 ICD-9-CM: 440.9  9/5/2019 - Present PVD (peripheral vascular disease) ([facility] ICD-10-CM: I73.9 ICD-9-CM: 443.9  8/1/2019 - Present Severe obesity ([facility] ICD-10-CM: E66.01 
ICD-9-CM: 278.01  7/30/2019 - Present COPD (chronic obstructive pulmonary disease) (HCC) ICD-10-CM: J44.9 ICD-9-CM: 243  7/13/2019 - Present Normocytic anemia ICD-10-CM: D64.9 ICD-9-CM: 285.9  7/13/2019 - Present PAD (peripheral artery disease) (HCC) ICD-10-CM: I73.9 ICD-9-CM: 443.9  7/13/2019 - Present Mild intermittent asthma ICD-10-CM: J45.20 ICD-9-CM: 493.90  5/17/2019 - Present Brachial artery thrombus (HCC) ICD-10-CM: H65.7 ICD-9-CM: 444.21  4/26/2019 - Present Sleep apnea ICD-10-CM: G47.30 ICD-9-CM: 780.57  1/5/2019 - Present Overview Signed 1/5/2019  8:41 PM by Ki Santa, DO  
  wears CPAP Atrial fibrillation [facility]) ICD-10-CM: I48.91 
ICD-9-CM: 427.31  11/16/2018 - Present Obesity (BMI 30-39.9) (Chronic) ICD-10-CM: K15.9 ICD-9-CM: 278.00  11/13/2018 - Present Recurrent deep vein thrombosis (DVT) (HCC) ICD-10-CM: I82.409 ICD-9-CM: 453.40  3/30/2018 - Present Chronic anticoagulation (Chronic) ICD-10-CM: Z79.01 
ICD-9-CM: V58.61  3/30/2018 - Present Essential hypertension (Chronic) ICD-10-CM: I10 
ICD-9-CM: 401.9  1/22/2016 - Present CAD (coronary artery disease) ICD-10-CM: I25.10 ICD-9-CM: 414.00  10/9/2015 - Present Type II diabetes mellitus (HCC) (Chronic) ICD-10-CM: E11.9 ICD-9-CM: 250.00  10/9/2015 - Present RESOLVED: Syncope and collapse ICD-10-CM: R55 
ICD-9-CM: 780.2  9/29/2020 - 11/2/2020 RESOLVED: Cellulitis ICD-10-CM: L03.90 ICD-9-CM: 682.9  3/23/2020 - 5/29/2020 RESOLVED: Hypokalemia ICD-10-CM: E87.6 ICD-9-CM: 276.8  3/23/2020 - 5/29/2020 RESOLVED: Hyponatremia ICD-10-CM: E87.1 ICD-9-CM: 276.1  3/23/2020 - 5/29/2020 RESOLVED: Diarrhea ICD-10-CM: R19.7 ICD-9-CM: 787.91  3/23/2020 - 5/29/2020 RESOLVED: Syncope and collapse ICD-10-CM: R55 
ICD-9-CM: 780.2  7/13/2019 - 5/29/2020 RESOLVED: UTI (urinary tract infection) ICD-10-CM: N39.0 ICD-9-CM: 599.0  7/13/2019 - 11/2/2020 RESOLVED: Sepsis ([facility] ICD-10-CM: A41.9 ICD-9-CM: 038.9, 995.91  7/13/2019 - 11/3/2020 RESOLVED: Leg wound, left ICD-10-CM: Y10.997C ICD-9-CM: 891.0  7/13/2019 - 5/29/2020 RESOLVED: Leg laceration, right, initial encounter ICD-10-CM: I13.845W ICD-9-CM: 894.0  7/13/2019 - 5/29/2020 RESOLVED: Cellulitis of right upper arm ICD-10-CM: L03.113 ICD-9-CM: 682.3  5/17/2019 - 5/29/2020 RESOLVED: Gangrene of finger of right hand ([facility] ICD-10-CM: H85 
ICD-9-CM: 785.4  5/17/2019 - 11/2/2020 RESOLVED: Bowel obstruction ([facility] ICD-10-CM: D00.819 ICD-9-CM: 560.9  1/8/2019 - 5/29/2020 RESOLVED: Partial small bowel obstruction ([facility] ICD-10-CM: K56.600 ICD-9-CM: 560.9  1/5/2019 - 5/29/2020 RESOLVED: Dyspnea ICD-10-CM: R06.00 
ICD-9-CM: 786.09  11/13/2018 - 5/29/2020 RESOLVED: ARF (acute renal failure) (HCC) ICD-10-CM: N17.9 ICD-9-CM: 584.9  11/13/2018 - 5/29/2020 RESOLVED: Closed wedge compression fracture of T7 vertebra ([facility] ICD-10-CM: T42.586W ICD-9-CM: 805.2  11/13/2018 - 5/29/2020 RESOLVED: Type 2 diabetes with nephropathy ([facility] ICD-10-CM: E11.21 
ICD-9-CM: 250.40, 583.81  10/1/2018 - 11/2/2020 RESOLVED: Chest pain ICD-10-CM: R07.9 ICD-9-CM: 786.50  6/27/2018 - 5/29/2020 RESOLVED: Abdominal pain ICD-10-CM: R10.9 ICD-9-CM: 789.00  6/27/2018 - 5/29/2020 RESOLVED: Coronary artery disease involving native coronary artery without angina pectoris (Chronic) ICD-10-CM: I25.10 ICD-9-CM: 414.01  1/22/2016 - 11/2/2020 RESOLVED: HTN (hypertension) ICD-10-CM: I10 
ICD-9-CM: 401.9  10/9/2015 - 1/22/2016 RESOLVED: NSTEMI (non-ST elevated myocardial infarction) ([facility] ICD-10-CM: I21.4 ICD-9-CM: 410.70  10/9/2015 - 5/29/2020 Subjective: Denisa Chadwick is a 70 y.o. female who presented with shortness of breath which has been progressively worsening since this weekend. She reports having by cough and sinus congestion. Symptoms acutely worsened this morning. She denies any chest pain, abdominal pain, diarrhea, or fever. She has felt chills. Upon arrival of EMS she was found to have saturations in the 70s. She has a history of chronic COPD and uses 3 L of oxygen at baseline. [Dr Emma Kirby : Got 2 units PRBCs, but follow up labs unable to be obtained for the past 12 hours. I spoke with anesthesia and they will access her for central line. Pt tolerated PO, +BM yesterday. Says her legs are no more swollen than her baseline. 1/3: Sugar was in 50s before bed last night. Held bedtime humalog and halved lantus. Sugars better this AM.  Now in Afib with RVR. Responded only briefly to dilt push. Pt feels the tachycardia. Denies increased SOB or CP. Tolerated PO yesterday. Due for bowel prep today, but on hold until cardiovascularly more stable. :  She reports she feels better this AM.  Currently in sinus rhythm at this moment. She is going to endoscopy this AM.  Hb up to 8.6. WBC 14.4. Will decrease Prednisone to 20mg per day. :  Continues to feel better but still very fatigued. Remains in sinus rhythm at this moment. Upper and lower endoscopies as under Data Review. Eliquis resumed. Review of Systems: A comprehensive review of systems was negative except for that written in the HPI. Objective:  
Physical Exam:  
 
Visit Vitals BP (!) 131/51 Pulse 81 Temp 97.6 °F (36.4 °C) Resp 19 Ht 5' 7\" (1.702 m) Wt 115 kg (253 lb 9.6 oz) SpO2 100% BMI 39.72 kg/m² O2 Flow Rate (L/min): 3 l/min O2 Device: Nasal cannula Temp (24hrs), Av.8 °F (36.6 °C), Min:97 °F (36.1 °C), Max:98.5 °F (36.9 °C) No intake/output data recorded.  1901 -  0700 In: 4011.3 [P.O.:1520; I.V.:2491.3] Out: 850 [Urine:850] General:  Alert, cooperative, no distress, appears stated age, moon-face. Head:  Normocephalic, without obvious abnormality, atraumatic. Eyes:  Conjunctivae/corneas clear. EOMs intact. Throat: Lips, mucosa, and tongue moist.  
Neck: Supple, symmetrical, trachea midline, no adenopathy, thyroid: no enlargement/tenderness/nodules, no carotid bruit and no JVD. Lungs:    diminished at bases, a few scattered rhonchi Chest wall:  No tenderness or deformity. Heart:   reg with rate in 80s at this time - monitor shows sinus. no murmur, click, rub or gallop. Abdomen:   Soft, non-tender. Bowel sounds normal. No masses,  No organomegaly. Extremities: Extremities normal, atraumatic, no cyanosis, 2+ pitting edema arms and legs. No calf tenderness or cords. Pulses: 2+ and symmetric all extremities. Skin: Skin color, texture, turgor normal. No rashes Neurologic:   Alert and oriented X 3.  equal.  Gait not tested at this time. Sensation grossly normal to touch. Gross motor of extremities normal.    
 
Data Review:  
  Esophagogastroduodenoscopy (EGD) Colonoscopy Procedure Note Nick Betancur : 1945 [address] [place] [address] Record Number: [id number] 
  
  
Indications:    Iron deficiency anemia, diarrhea Referring Physician:  Carolina Simms MD 
Anesthesia/Sedation: see nursing notes Endoscopist:  Dr. Kasey Colon Assistants: None Procedure in Detail: After obtaining informed consent, positioning of the patient in the left lateral decubitus position, and conduction of a pre-procedure pause or \"time out\" the endoscope was introduced into the mouth and advanced to the duodenum. A careful inspection was made, and findings or interventions are described below. Findings:  
Esophagus:numerous white exudates Stomach: antral erythema with soft submucosal mass Normal body and fundus Duodenum/jejunum: erythema, erosions second into third portion Complications/estimated blood loss: none Therapies:  biopsy of stomach antrum at site submucosal mass 
biopsy of duodenal second portion, third portion Specimens: biopsies Implants:none Endoscopist:  Dr. Arlette Chinchilla Assistants: None Complications:  None Estimate Blood Loss:  None 
  
Colonoscopy Procedure in Detail: After obtaining informed consent, positioning of the patient in the left lateral decubitus position, and conduction of a pre-procedure pause or \"time out\" the endoscope was introduced into the anus and advanced to the terminal ileum. The quality of the colonic preparation was adequate. A careful inspection was made as the colonoscope was withdrawn, findings and interventions are described below. Appendiceal orifice photographed Findings: - Diverticulosis Two sessile 12 mm polyps Specimens:  
 polyps removed cold snare; random biopsies cecum, ascending Implants: none Complications:  
None; patient tolerated the procedure well. Estimated blood loss: none Impression: 
Candida esophagitis, gastritis, duodenitis, colon polyps, diverticulosis. Antral mass likely a benign lipoma Recommendations: - Await pathology. - anticoagulate tomorrow Resume diet; add creon, nystatin suspension Thank you for entrusting me with this patient's care. Please do not hesitate to contact me with any questions or if I can be of assistance with any of your other patients' GI needs. Signed By: Arlette Chinchilla MD 
                      January 4, 2021 Recent Days: 
Recent Labs 01/04/21 
0344 01/03/21 
0159 01/02/21 
1110 WBC 14.4* 15.4* 14.5* HGB 8.6* 7.3* 7.8* HCT 28.7* 24.5* 26.0*  
 400 443* Recent Labs 01/04/21 
0130 01/03/21 
0159 01/02/21 
1110  140 138  
K 3.7 3.8 4.7  106 104 CO2 34* 32 33* * 150* 209* BUN 17 21* 21* CREA 1.11* 1.07* 1.03* CA 7.9* 7.7* 7.8*  
MG  --  2.4  --   
PHOS  --  2.2*  --   
ALB 2.3* 2.3* 2.5*  
 ALT 24 19 20 No results for input(s): PH, PCO2, PO2, HCO3, FIO2 in the last 72 hours. 24 Hour Results: 
Recent Results (from the past 24 hour(s)) GLUCOSE, POC Collection Time: 01/04/21  7:48 AM  
Result Value Ref Range Glucose (POC) 99 65 - 100 mg/dL Performed by Johanna Fontanez 19, TISSUE Collection Time: 01/04/21  9:06 AM  
Result Value Ref Range H. pylori from tissue Negative Negative Positive control Positive Negative control Negative Lot no. [id number] GLUCOSE, POC Collection Time: 01/04/21 11:25 AM  
Result Value Ref Range Glucose (POC) 72 65 - 100 mg/dL Performed by Chapo Finney, POC Collection Time: 01/04/21  5:49 PM  
Result Value Ref Range Glucose (POC) 121 (H) 65 - 100 mg/dL Performed by Chaop Finney, POC Collection Time: 01/04/21 10:19 PM  
Result Value Ref Range Glucose (POC) 197 (H) 65 - 100 mg/dL Performed by Dmitry Gerber SAMPLES BEING HELD Collection Time: 01/05/21  5:50 AM  
Result Value Ref Range SAMPLES BEING HELD 1SST COMMENT Add-on orders for these samples will be processed based on acceptable specimen integrity and analyte stability, which may vary by analyte. Medications reviewed Current Facility-Administered Medications Medication Dose Route Frequency  nystatin (MYCOSTATIN) 100,000 unit/mL oral suspension 500,000 Units  500,000 Units Oral QID  lipase-protease-amylase (CREON 24,000) capsule 1 Cap  1 Cap Oral TID WITH MEALS  dilTIAZem ER (CARDIZEM CD) capsule 120 mg  120 mg Oral DAILY  apixaban (ELIQUIS) tablet 5 mg  5 mg Oral BID  predniSONE (DELTASONE) tablet 20 mg  20 mg Oral DAILY WITH BREAKFAST  phosphorus (K PHOS NEUTRAL) 250 mg tablet 1 Tab  1 Tab Oral BID  pantoprazole (PROTONIX) tablet 40 mg  40 mg Oral ACB  
 0.9% sodium chloride infusion 250 mL  250 mL IntraVENous PRN  
 0.9% sodium chloride infusion 250 mL  250 mL IntraVENous PRN  
  sodium chloride (NS) flush 5-10 mL  5-10 mL IntraVENous PRN  
 sodium chloride (NS) flush 5-40 mL  5-40 mL IntraVENous Q8H  
 sodium chloride (NS) flush 5-40 mL  5-40 mL IntraVENous PRN  
 acetaminophen (TYLENOL) tablet 650 mg  650 mg Oral Q6H PRN Or  
 acetaminophen (TYLENOL) suppository 650 mg  650 mg Rectal Q6H PRN  polyethylene glycol (MIRALAX) packet 17 g  17 g Oral DAILY PRN  promethazine (PHENERGAN) tablet 12.5 mg  12.5 mg Oral Q6H PRN Or  
 ondansetron (ZOFRAN) injection 4 mg  4 mg IntraVENous Q6H PRN  
 0.9% sodium chloride infusion  75 mL/hr IntraVENous CONTINUOUS  
 ipratropium-albuterol (COMBIVENT RESPIMAT) 20 mcg-100 mcg inhalation spray  1 Puff Inhalation Q4H RT  
 0.9% sodium chloride infusion 250 mL  250 mL IntraVENous PRN  
 glucose chewable tablet 16 g  4 Tab Oral PRN  
 dextrose (D50W) injection syrg 12.5-25 g  25-50 mL IntraVENous PRN  
 glucagon (GLUCAGEN) injection 1 mg  1 mg IntraMUSCular PRN  
 insulin glargine (LANTUS) injection 23 Units  0.2 Units/kg SubCUTAneous QHS  insulin lispro (HUMALOG) injection   SubCUTAneous QID WITH MEALS  cholecalciferol (VITAMIN D3) (1000 Units /25 mcg) tablet 2 Tab  2,000 Units Oral DAILY  cyanocobalamin (VITAMIN B12) tablet 1,000 mcg  1,000 mcg Oral DAILY  digoxin (LANOXIN) tablet 0.125 mg  0.125 mg Oral DAILY  DULoxetine (CYMBALTA) capsule 60 mg  60 mg Oral DAILY  metoprolol tartrate (LOPRESSOR) tablet 25 mg  25 mg Oral DAILY PRN  
 mirtazapine (REMERON) tablet 15 mg  15 mg Oral QHS  montelukast (SINGULAIR) tablet 10 mg  10 mg Oral QPM  
 
 
Care Plan discussed with: Patient, GI and Nurse Total time spent with patient: 30 minutes.  
Jessi Arora MD 
 89

## (undated) DEVICE — SUTURE PROL 5-0 L18IN NONABSORBABLE BLU RB-2 L13MM 1/2 CIR 8713H

## (undated) DEVICE — SUTURE VCRL SZ 2-0 L36IN ABSRB UD L40MM CT 1/2 CIR J957H

## (undated) DEVICE — SYR 10ML LUER LOK 1/5ML GRAD --

## (undated) DEVICE — KIT COLON W/ 1.1OZ LUB AND 2 END

## (undated) DEVICE — SUTURE CHROMIC GUT SZ 5-0 L18IN ABSRB BRN P-3 L13MM 3/8 CIR 687G

## (undated) DEVICE — BITEBLOCK ENDOSCP 60FR MAXI WHT POLYETH STURDY W/ VELC WVN

## (undated) DEVICE — SOLUTION IV 1000ML 0.9% SOD CHL

## (undated) DEVICE — DRAPE XR C ARM 41X74IN LF --

## (undated) DEVICE — SUTURE PROL SZ 5-0 L24IN NONABSORBABLE BLU RB-2 L13IN 1/2 8554H

## (undated) DEVICE — Z DISCONTINUED GLOVE SURG SZ 7.5 L12IN FNGR THK13MIL WHT ISOLEX

## (undated) DEVICE — FLOSEAL HEMOSTATIC MATRIX, 10 ML: Brand: FLOSEAL

## (undated) DEVICE — 3M™ BAIR HUGGER® UNDERBODY BLANKET, FULL ACCESS, 10 PER CASE 63500: Brand: BAIR HUGGER™

## (undated) DEVICE — FOGARTY ARTERIAL EMBOLECTOMY CATHETER 4F 80CM: Brand: FOGARTY

## (undated) DEVICE — (D)PREP SKN CHLRAPRP APPL 26ML -- CONVERT TO ITEM 371833

## (undated) DEVICE — REM POLYHESIVE ADULT PATIENT RETURN ELECTRODE: Brand: VALLEYLAB

## (undated) DEVICE — Device

## (undated) DEVICE — SUTURE VCRL SZ 3-0 L27IN ABSRB UD L26MM SH 1/2 CIR J416H

## (undated) DEVICE — SUTURE VCRL 3-0 L36IN ABSRB UD CT L40MM 1/2 CIR TAPERPOINT J956H

## (undated) DEVICE — SYR 5ML 1/5 GRAD LL NSAF LF --

## (undated) DEVICE — COVER LT HNDL PLAS RIG 1 PER PK

## (undated) DEVICE — FOGARTY ARTERIAL EMBOLECTOMY CATHETER 3F 40CM: Brand: FOGARTY

## (undated) DEVICE — BANDAGE COMPR 9 FTX4 IN SMOOTH COMFORTABLE SYNTH ESMRK LF

## (undated) DEVICE — BAG SPEC BIOHZRD 10 X 10 IN --

## (undated) DEVICE — TOWEL SURG W17XL27IN STD BLU COT NONFENESTRATED PREWASHED

## (undated) DEVICE — INFECTION CONTROL KIT SYS

## (undated) DEVICE — VASCULAR-RICHMOND-LF: Brand: MEDLINE INDUSTRIES, INC.

## (undated) DEVICE — FOGARTY ARTERIAL EMBOLECTOMY CATHETER 2F 60CM: Brand: FOGARTY

## (undated) DEVICE — SIMPLICITY FLUFF UNDERPAD 23X36, MODERATE: Brand: SIMPLICITY

## (undated) DEVICE — 1200 GUARD II KIT W/5MM TUBE W/O VAC TUBE: Brand: GUARDIAN

## (undated) DEVICE — BLADE ASSEMB CLP HAIR FINE --

## (undated) DEVICE — Z DISCONTINUED NO SUB IDED TAPE UMB W1/8XL36IN WHT COT STRND NONRADIOPAQUE

## (undated) DEVICE — STRAP,POSITIONING,KNEE/BODY,FOAM,4X60": Brand: MEDLINE

## (undated) DEVICE — YANKAUER BULB TIP, NO VENT: Brand: ARGYLE

## (undated) DEVICE — ELECTRODE,RADIOTRANSLUCENT,FOAM,3PK: Brand: MEDLINE

## (undated) DEVICE — FLOSEAL MATRIX IS INDICATED IN SURGICAL PROCEDURES (OTHER THAN IN OPHTHALMIC) AS AN ADJUNCT TO HEMOSTASIS WHEN CONTROL OF BLEEDING BY LIGATURE OR CONVENTIONALPROCEDURES IS INEFFECTIVE OR IMPRACTICAL.: Brand: FLOSEAL HEMOSTATIC MATRIX

## (undated) DEVICE — SPONGE GZ W4XL4IN COT 12 PLY TYP VII WVN C FLD DSGN

## (undated) DEVICE — SYR 3ML LL TIP 1/10ML GRAD --

## (undated) DEVICE — APPLICATOR BNDG 1MM ADH PREMIERPRO EXOFIN

## (undated) DEVICE — SOLUTION IV 500ML 0.9% SOD CHL FLX CONT

## (undated) DEVICE — SET GRAV CK VLV NEEDLESS ST 3 GANGED 4WAY STPCOCK HI FLO 10

## (undated) DEVICE — GAUZE,SPONGE,4"X4",16PLY,XRAY,STRL,LF: Brand: MEDLINE

## (undated) DEVICE — SUTURE MCRYL SZ 4-0 L27IN ABSRB UD L19MM PS-2 1/2 CIR PRIM Y426H

## (undated) DEVICE — STAPLER SKIN 35CT WD STRL DISP -- MULTIFIRE PREMIUM

## (undated) DEVICE — DRAPE,REIN 53X77,STERILE: Brand: MEDLINE

## (undated) DEVICE — STERILE POLYISOPRENE POWDER-FREE SURGICAL GLOVES: Brand: PROTEXIS

## (undated) DEVICE — TRAY CATH OD16FR SIL URIN M STATLOK STBL DEV SURSTP

## (undated) DEVICE — SUTURE VCRL SZ 2-0 L36IN ABSRB UD L36MM CT-1 1/2 CIR J945H

## (undated) DEVICE — 3M™ CUROS™ DISINFECTING CAP FOR NEEDLELESS CONNECTORS 270/CARTON 20 CARTONS/CASE CFF1-270: Brand: CUROS™

## (undated) DEVICE — SNARE ENDOSCP M L240CM W27MM SHTH DIA2.4MM CHN 2.8MM OVL

## (undated) DEVICE — SUTURE VCRL SZ 2-0 L27IN ABSRB UD L26MM SH 1/2 CIR J417H

## (undated) DEVICE — SYRINGE,EAR/ULCER, 2 OZ, STERILE: Brand: MEDLINE

## (undated) DEVICE — DRESSING,GAUZE,XEROFORM,CURAD,1"X8",ST: Brand: CURAD

## (undated) DEVICE — DEVON™ KNEE AND BODY STRAP 60" X 3" (1.5 M X 7.6 CM): Brand: DEVON

## (undated) DEVICE — CUFF TRNQT DGT MED ELAS ORN --

## (undated) DEVICE — AGENT HEMSTAT W4XL4IN OXIDIZED REGENERATED CELOS ABSRB SFT

## (undated) DEVICE — POLYP TRAP: Brand: TRAPEASE®

## (undated) DEVICE — CONTAINER SPEC 20 ML LID NEUT BUFF FORMALIN 10 % POLYPR STS

## (undated) DEVICE — TELFA ADHESIVE ISLAND DRESSING: Brand: TELFA

## (undated) DEVICE — HANDLE LT SNAP ON ULT DURABLE LENS FOR TRUMPF ALC DISPOSABLE

## (undated) DEVICE — LIGHT HANDLE: Brand: DEVON

## (undated) DEVICE — DRAPE,HAND,STERILE: Brand: MEDLINE

## (undated) DEVICE — FORCEPS BX L240CM JAW DIA2.8MM L CAP W/ NDL MIC MESH TOOTH

## (undated) DEVICE — PADDING CST CRMPD 3INX4YD NS --

## (undated) DEVICE — FOGARTY ARTERIAL EMBOLECTOMY CATHETER 3F 80CM: Brand: FOGARTY

## (undated) DEVICE — DRAPE SHT 3 QTR PROXIMA 53X77 --

## (undated) DEVICE — SOLIDIFIER MEDC 1200ML -- CONVERT TO 356117

## (undated) DEVICE — SUT PROL 6-0 24IN BV1 DA BLU --

## (undated) DEVICE — SUTURE GORTX SZ 4-0 L24IN NONABSORBABLE L13MM PT-13 3/8 CIR 5K08A

## (undated) DEVICE — BANDAGE COMPR SELF ADH 5 YDX2 IN TAN NS PREMIERPRO LF

## (undated) DEVICE — BIPOLAR FORCEPS CORD: Brand: VALLEYLAB

## (undated) DEVICE — NEEDLE HYPO 25GA L1.5IN BVL ORIENTED ECLIPSE

## (undated) DEVICE — CANNULA CUSH AD W/ 14FT TBG